# Patient Record
Sex: FEMALE | Race: WHITE | NOT HISPANIC OR LATINO | Employment: OTHER | URBAN - METROPOLITAN AREA
[De-identification: names, ages, dates, MRNs, and addresses within clinical notes are randomized per-mention and may not be internally consistent; named-entity substitution may affect disease eponyms.]

---

## 2017-02-22 ENCOUNTER — TRANSFERRED RECORDS (OUTPATIENT)
Dept: HEALTH INFORMATION MANAGEMENT | Facility: CLINIC | Age: 62
End: 2017-02-22

## 2017-06-06 ENCOUNTER — TRANSFERRED RECORDS (OUTPATIENT)
Dept: HEALTH INFORMATION MANAGEMENT | Facility: CLINIC | Age: 62
End: 2017-06-06

## 2017-07-27 ENCOUNTER — OFFICE VISIT (OUTPATIENT)
Dept: FAMILY MEDICINE | Facility: CLINIC | Age: 62
End: 2017-07-27
Payer: MEDICAID

## 2017-07-27 VITALS
HEART RATE: 83 BPM | SYSTOLIC BLOOD PRESSURE: 124 MMHG | TEMPERATURE: 98.4 F | BODY MASS INDEX: 29.89 KG/M2 | OXYGEN SATURATION: 99 % | WEIGHT: 175.1 LBS | DIASTOLIC BLOOD PRESSURE: 80 MMHG

## 2017-07-27 DIAGNOSIS — C50.912 MALIGNANT NEOPLASM OF LEFT FEMALE BREAST, UNSPECIFIED ESTROGEN RECEPTOR STATUS, UNSPECIFIED SITE OF BREAST (H): Primary | ICD-10-CM

## 2017-07-27 PROCEDURE — 99202 OFFICE O/P NEW SF 15 MIN: CPT | Performed by: PHYSICIAN ASSISTANT

## 2017-07-27 PROCEDURE — T1013 SIGN LANG/ORAL INTERPRETER: HCPCS | Mod: U3 | Performed by: PHYSICIAN ASSISTANT

## 2017-07-27 NOTE — NURSING NOTE
"No chief complaint on file.      Initial /80  Pulse 83  Temp 98.4  F (36.9  C) (Oral)  Wt 175 lb 1.6 oz (79.4 kg)  SpO2 99%  BMI 29.89 kg/m2 Estimated body mass index is 29.89 kg/(m^2) as calculated from the following:    Height as of 7/24/16: 5' 4.17\" (1.63 m).    Weight as of this encounter: 175 lb 1.6 oz (79.4 kg).  Medication Reconciliation: complete     Nathalie Penn MA      "

## 2017-07-27 NOTE — PATIENT INSTRUCTIONS
Schedule general physical and come fasting for 8 hours so we can check cholesterol  Follow up with breast center  Return to clinic for any new or worsening symptoms or go to ER Urgent care in off hours

## 2017-07-27 NOTE — PROGRESS NOTES
SUBJECTIVE:                                                    Hoda Brush is a 62 year old female who presents to clinic today for the following health issues:      Came here 3 weeks ago as a refugee  Wants new treatment plans for mastitis  2 summers ago she did have a PET scan [place] here in the [place]    History of breast cancer 3 years ago. Currently on tamoxifen          Problem list and histories reviewed & adjusted, as indicated.  Additional history: as documented    ROS:  C: NEGATIVE for fever, chills, change in weight  I: NEGATIVE for worrisome rashes, moles or lesions  E: NEGATIVE for vision changes or irritation  E/M: NEGATIVE for ear, mouth and throat problems  R: NEGATIVE for significant cough or SOB  B: NEGATIVE for masses, tenderness or discharge  CV: NEGATIVE for chest pain, palpitations or peripheral edema  GI: NEGATIVE for nausea, abdominal pain, heartburn, or change in bowel habits  : NEGATIVE for frequency, dysuria, or hematuria  M: NEGATIVE for significant arthralgias or myalgia  N: NEGATIVE for weakness, dizziness or paresthesias  E: NEGATIVE for temperature intolerance, skin/hair changes  H: NEGATIVE for bleeding problems  P: NEGATIVE for changes in mood or affect    There is no problem list on file for this patient.    Past Surgical History:   Procedure Laterality Date     MASTECTOMY         Social History   Substance Use Topics     Smoking status: Never Smoker     Smokeless tobacco: Never Used     Alcohol use No     Family History   Problem Relation Age of Onset     Breast Cancer Mother 45     Lung Cancer Father      smoker     Breast Cancer Sister 61               OBJECTIVE:                                                    /80  Pulse 83  Temp 98.4  F (36.9  C) (Oral)  Wt 175 lb 1.6 oz (79.4 kg)  SpO2 99%  BMI 29.89 kg/m2 Body mass index is 29.89 kg/(m^2).   GENERAL: healthy, alert, well nourished, well hydrated, no distress  EYES: Eyes grossly normal to inspection,  "extraocular movements - intact, and PERRL  HENT: ear canals- normal; TMs- normal; Nose- normal; Mouth- no ulcers, no lesions  NECK: no tenderness, no adenopathy, no asymmetry, no masses, no stiffness; thyroid- normal to palpation  RESP: lungs clear to auscultation - no rales, no rhonchi, no wheezes  CV: regular rates and rhythm, normal S1 S2, no S3 or S4 and no murmur, no click or rub -  MS: extremities- no gross deformities noted, no edema  PSYCH: Alert and oriented times 3; speech- coherent , normal rate and volume; able to articulate logical thoughts, able to abstract reason, no tangential thoughts, no hallucinations or delusions, affect- normal         ASSESSMENT/PLAN:                                                        ICD-10-CM    1. Malignant neoplasm of left female breast, unspecified estrogen receptor status, unspecified site of breast (H) C50.912 BREAST CENTER REFERRAL       Patient Instructions   Schedule general physical and come fasting for 8 hours so we can check cholesterol  Follow up with breast center  Return to clinic for any new or worsening symptoms or go to ER Urgent care in off hours            Estimated body mass index is 29.89 kg/(m^2) as calculated from the following:    Height as of 7/24/16: 5' 4.17\" (1.63 m).    Weight as of this encounter: 175 lb 1.6 oz (79.4 kg).       Steffi Smith [facility]  [facility]    "

## 2017-08-08 ENCOUNTER — OFFICE VISIT (OUTPATIENT)
Dept: FAMILY MEDICINE | Facility: CLINIC | Age: 62
End: 2017-08-08
Payer: MEDICAID

## 2017-08-08 VITALS
SYSTOLIC BLOOD PRESSURE: 119 MMHG | DIASTOLIC BLOOD PRESSURE: 78 MMHG | OXYGEN SATURATION: 97 % | TEMPERATURE: 97.5 F | HEIGHT: 63 IN | WEIGHT: 178 LBS | HEART RATE: 74 BPM | BODY MASS INDEX: 31.54 KG/M2

## 2017-08-08 DIAGNOSIS — Z00.00 ENCOUNTER FOR ROUTINE ADULT HEALTH EXAMINATION WITHOUT ABNORMAL FINDINGS: Primary | ICD-10-CM

## 2017-08-08 DIAGNOSIS — E55.9 VITAMIN D DEFICIENCY: ICD-10-CM

## 2017-08-08 DIAGNOSIS — Z11.59 NEED FOR HEPATITIS C SCREENING TEST: ICD-10-CM

## 2017-08-08 DIAGNOSIS — C50.912 MALIGNANT NEOPLASM OF LEFT FEMALE BREAST, UNSPECIFIED ESTROGEN RECEPTOR STATUS, UNSPECIFIED SITE OF BREAST (H): ICD-10-CM

## 2017-08-08 DIAGNOSIS — Z12.11 SCREENING FOR COLON CANCER: ICD-10-CM

## 2017-08-08 DIAGNOSIS — Z12.4 SCREENING FOR CERVICAL CANCER: ICD-10-CM

## 2017-08-08 DIAGNOSIS — Z13.220 LIPID SCREENING: ICD-10-CM

## 2017-08-08 LAB
ALBUMIN SERPL-MCNC: 3.3 G/DL (ref 3.4–5)
ALP SERPL-CCNC: 35 U/L (ref 40–150)
ALT SERPL W P-5'-P-CCNC: 27 U/L (ref 0–50)
ANION GAP SERPL CALCULATED.3IONS-SCNC: 7 MMOL/L (ref 3–14)
AST SERPL W P-5'-P-CCNC: 27 U/L (ref 0–45)
BILIRUB SERPL-MCNC: 0.2 MG/DL (ref 0.2–1.3)
BUN SERPL-MCNC: 16 MG/DL (ref 7–30)
CALCIUM SERPL-MCNC: 8.6 MG/DL (ref 8.5–10.1)
CHLORIDE SERPL-SCNC: 108 MMOL/L (ref 94–109)
CHOLEST SERPL-MCNC: 154 MG/DL
CO2 SERPL-SCNC: 28 MMOL/L (ref 20–32)
CREAT SERPL-MCNC: 0.85 MG/DL (ref 0.52–1.04)
DEPRECATED CALCIDIOL+CALCIFEROL SERPL-MC: 19 UG/L (ref 20–75)
GFR SERPL CREATININE-BSD FRML MDRD: 68 ML/MIN/1.7M2
GLUCOSE SERPL-MCNC: 86 MG/DL (ref 70–99)
HCV AB SERPL QL IA: NORMAL
HDLC SERPL-MCNC: 53 MG/DL
LDLC SERPL CALC-MCNC: 74 MG/DL
NONHDLC SERPL-MCNC: 101 MG/DL
POTASSIUM SERPL-SCNC: 3.8 MMOL/L (ref 3.4–5.3)
PROT SERPL-MCNC: 7.1 G/DL (ref 6.8–8.8)
SODIUM SERPL-SCNC: 143 MMOL/L (ref 133–144)
TRIGL SERPL-MCNC: 133 MG/DL
TSH SERPL DL<=0.005 MIU/L-ACNC: 1.3 MU/L (ref 0.4–4)

## 2017-08-08 PROCEDURE — T1013 SIGN LANG/ORAL INTERPRETER: HCPCS | Mod: U3 | Performed by: PHYSICIAN ASSISTANT

## 2017-08-08 PROCEDURE — 80053 COMPREHEN METABOLIC PANEL: CPT | Performed by: PHYSICIAN ASSISTANT

## 2017-08-08 PROCEDURE — 84443 ASSAY THYROID STIM HORMONE: CPT | Performed by: PHYSICIAN ASSISTANT

## 2017-08-08 PROCEDURE — 80061 LIPID PANEL: CPT | Performed by: PHYSICIAN ASSISTANT

## 2017-08-08 PROCEDURE — 87624 HPV HI-RISK TYP POOLED RSLT: CPT | Performed by: PHYSICIAN ASSISTANT

## 2017-08-08 PROCEDURE — 36415 COLL VENOUS BLD VENIPUNCTURE: CPT | Performed by: PHYSICIAN ASSISTANT

## 2017-08-08 PROCEDURE — G0472 HEP C SCREEN HIGH RISK/OTHER: HCPCS | Performed by: PHYSICIAN ASSISTANT

## 2017-08-08 PROCEDURE — G0145 SCR C/V CYTO,THINLAYER,RESCR: HCPCS | Performed by: PHYSICIAN ASSISTANT

## 2017-08-08 PROCEDURE — 82306 VITAMIN D 25 HYDROXY: CPT | Performed by: PHYSICIAN ASSISTANT

## 2017-08-08 PROCEDURE — 99396 PREV VISIT EST AGE 40-64: CPT | Performed by: PHYSICIAN ASSISTANT

## 2017-08-08 PROCEDURE — G0476 HPV COMBO ASSAY CA SCREEN: HCPCS | Performed by: PHYSICIAN ASSISTANT

## 2017-08-08 NOTE — NURSING NOTE
"Chief Complaint   Patient presents with     Physical       Initial /78  Pulse 74  Temp 97.5  F (36.4  C) (Oral)  Ht 5' 3\" (1.6 m)  Wt 178 lb (80.7 kg)  SpO2 97%  BMI 31.53 kg/m2 Estimated body mass index is 31.53 kg/(m^2) as calculated from the following:    Height as of this encounter: 5' 3\" (1.6 m).    Weight as of this encounter: 178 lb (80.7 kg).  Medication Reconciliation: complete   Mary Triplett MA      "

## 2017-08-08 NOTE — MR AVS SNAPSHOT
After Visit Summary   8/8/2017    Hoda Brush    MRN: [id number]           Patient Information     Date Of Birth          1955        Visit Information        Provider Department      8/8/2017 7:30 AM Steffi Webb PA-C; [facility]        Today's Diagnoses     Encounter for routine adult health examination without abnormal findings    -  1    Malignant neoplasm of left female breast, unspecified estrogen receptor status, unspecified site of breast (H)        Screening for colon cancer        Lipid screening        Vitamin D deficiency        Need for hepatitis C screening test           Follow-ups after your visit        Additional Services     COLORECTAL SURGERY REFERRAL       Your provider has referred you to: [facility]: [facility] [phone]   [email or web address]    Referral Reason(s): Colonoscopy  Special Concerns:   This referral is: Elective (week +)  It  OK to leave a message on patient's voicemail.    Please be aware that coverage of these services is subject to the terms and limitations of your health insurance plan.  Call member services at your health plan with any benefit or coverage questions.      Please bring the following with you to your appointment:    (1) Any X-Rays, CTs or MRIs which have been performed.  Contact the facility where they were done to arrange for  prior to your scheduled appointment.    (2) List of current medications  (3) This referral request   (4) Any documents/labs given to you for this referral                  Your next 10 appointments already scheduled     Aug 15, 2017  8:30 AM CDT   (Arrive by 8:15 AM)   New Patient Visit with Lisandro Aguiar MD   [facility] ([facility])    [address] [place] [id number] [phone]              Who to  "contact     If you have questions or need follow up information about today's clinic visit or your schedule please contact [facility] directly at [phone].  Normal or non-critical lab and imaging results will be communicated to you by MyChart, letter or phone within 4 business days after the clinic has received the results. If you do not hear from us within 7 days, please contact the clinic through [employer] or phone. If you have a critical or abnormal lab result, we will notify you by phone as soon as possible.  Submit refill requests through [facility] or call your pharmacy and they will forward the refill request to us. Please allow 3 business days for your refill to be completed.          Additional Information About Your Visit        [facility] Information     [facility] lets you send messages to your doctor, view your test results, renew your prescriptions, schedule appointments and more. To sign up, go to [email or web address] . Click on \"Log in\" on the left side of the screen, which will take you to the Welcome page. Then click on \"Sign up Now\" on the right side of the page.     You will be asked to enter the access code listed below, as well as some personal information. Please follow the directions to create your username and password.     Your access code is: [email or web address]  Expires: 10/25/2017  8:18 AM     Your access code will  in 90 days. If you need help or a new code, please call your [facility] clinic or [phone].        Care EveryWhere ID     This is your Care EveryWhere ID. This could be used by other organizations to access your [facility] medical records  [id number]        Your Vitals Were     Pulse Temperature Height Pulse Oximetry BMI (Body Mass Index)       74 97.5  F (36.4  C) (Oral) 5' 3\" (1.6 m) 97% 31.53 kg/m2        Blood Pressure from Last 3 Encounters:   17 119/78   17 124/80   16 118/61    Weight from Last 3 Encounters:   17 178 lb (80.7 kg) "   07/27/17 175 lb 1.6 oz (79.4 kg)   07/24/16 174 lb 2.6 oz (79 kg)              We Performed the Following     COLORECTAL SURGERY REFERRAL     Comprehensive metabolic panel     Hepatitis C antibody     Lipid panel reflex to direct LDL     TSH with free T4 reflex     Vitamin D Deficiency        Primary Care Provider    Physician No Ref-Primary       No address on file        Equal Access to Services     NOE VAZQUEZ : Hadii kamran ku hadsreedharo Soomaali, waaxda luqadaha, qaybta kaalmada dougkarinateo, alexa saldañawillisana paula angulo . So [facility] [phone].    ATENCIÓN: Si habla español, tiene a lagunas disposición servicios gratuitos de asistencia lingüística. Llame al [phone].    We comply with applicable federal civil rights laws and [place] laws. We do not discriminate on the basis of race, color, national origin, age, disability sex, sexual orientation or gender identity.            Thank you!     Thank you for choosing [facility]  for your care. Our goal is always to provide you with excellent care. Hearing back from our patients is one way we can continue to improve our services. Please take a few minutes to complete the written survey that you may receive in the mail after your visit with us. Thank you!             Your Updated Medication List - Protect others around you: Learn how to safely use, store and throw away your medicines at www.disposemymeds.org.          This list is accurate as of: 8/8/17  8:13 AM.  Always use your most recent med list.                   Brand Name Dispense Instructions for use Diagnosis    HYDROcodone-acetaminophen 5-325 MG per tablet    NORCO    8 tablet    Take 1 tablet by mouth every 8 hours as needed for moderate to severe pain        TAMOXIFEN CITRATE PO

## 2017-08-08 NOTE — PROGRESS NOTES
SUBJECTIVE:   CC: Hoda Brush is an 62 year old woman who presents for preventive health visit.     Physical   Annual:     Getting at least 3 servings of Calcium per day::  Yes    Bi-annual eye exam::  Yes    Dental care twice a year::  Yes    Sleep apnea or symptoms of sleep apnea::  None    Diet::  Regular (no restrictions)    Frequency of exercise::  None    Taking medications regularly::  Yes    Medication side effects::  None    Additional concerns today::  No    Has appointment  On 8/15/17 with oncology          Today's PHQ-2 Score:   PHQ-2 ( 1999 Pfizer) 8/8/2017   Q1: Little interest or pleasure in doing things 0   Q2: Feeling down, depressed or hopeless 0   PHQ-2 Score 0       Abuse: Current or Past(Physical, Sexual or Emotional)- No  Do you feel safe in your environment - Yes    Social History   Substance Use Topics     Smoking status: Never Smoker     Smokeless tobacco: Never Used     Alcohol use No     The patient does not drink >3 drinks per day nor >7 drinks per week.    Reviewed orders with patient.  Reviewed health maintenance and updated orders accordingly - Yes  Labs reviewed in [facility]    Alternate mammogram schedule due to breast cancer history sees oncology    Pertinent mammograms are reviewed under the imaging tab.  History of abnormal Pap smear: NO - age 30-65 PAP every 5 years with negative HPV co-testing recommended    Reviewed and updated as needed this visit by clinical staff  Tobacco  Allergies  Meds         Reviewed and updated as needed this visit by Provider            ROS:  C: NEGATIVE for fever, chills, change in weight  I: NEGATIVE for worrisome rashes, moles or lesions  E: NEGATIVE for vision changes or irritation  ENT: NEGATIVE for ear, mouth and throat problems  R: NEGATIVE for significant cough or SOB  B: NEGATIVE for masses, tenderness or discharge  CV: NEGATIVE for chest pain, palpitations or peripheral edema  GI: NEGATIVE for nausea, abdominal pain, heartburn, or  "change in bowel habits  : NEGATIVE for unusual urinary or vaginal symptoms. No vaginal bleeding.  M: NEGATIVE for significant arthralgias or myalgia  N: NEGATIVE for weakness, dizziness or paresthesias  E: NEGATIVE for temperature intolerance, skin/hair changes  P: NEGATIVE for changes in mood or affect      OBJECTIVE:   /78  Pulse 74  Temp 97.5  F (36.4  C) (Oral)  Ht 5' 3\" (1.6 m)  Wt 178 lb (80.7 kg)  SpO2 97%  BMI 31.53 kg/m2  EXAM:  GENERAL: healthy, alert and no distress  EYES: Eyes grossly normal to inspection, PERRL and conjunctivae and sclerae normal  HENT: ear canals and TM's normal, nose and mouth without ulcers or lesions  NECK: no adenopathy, no asymmetry, masses, or scars and thyroid normal to palpation  RESP: lungs clear to auscultation - no rales, rhonchi or wheezes  CV: regular rate and rhythm, normal S1 S2, no S3 or S4, no murmur, click or rub, no peripheral edema and peripheral pulses strong  ABDOMEN: soft, nontender, no hepatosplenomegaly, no masses and bowel sounds normal   (female): normal female external genitalia, normal urethral meatus, vaginal mucosa pink, moist, well rugated, and normal cervix/adnexa/uterus without masses or discharge  MS: no gross musculoskeletal defects noted, no edema  SKIN: no suspicious lesions or rashes  NEURO: Normal strength and tone, mentation intact and speech normal  PSYCH: mentation appears normal, affect normal/bright    ASSESSMENT/PLAN:       ICD-10-CM    1. Encounter for routine adult health examination without abnormal findings Z00.00    2. Malignant neoplasm of left female breast, unspecified estrogen receptor status, unspecified site of breast (H) C50.912 TSH with free T4 reflex     Comprehensive metabolic panel   3. Screening for colon cancer Z12.11 COLORECTAL SURGERY REFERRAL   4. Lipid screening Z13.220 Lipid panel reflex to direct LDL   5. Vitamin D deficiency E55.9 Vitamin D Deficiency   6. Need for hepatitis C screening test Z11.59 " "Hepatitis C antibody   7. Screening for cervical cancer Z12.4 Pap imaged thin layer screen with HPV - recommended age 30 - 65 years (select HPV order below)     HPV High Risk Types DNA Cervical       COUNSELING:  Reviewed preventive health counseling, as reflected in patient instructions         reports that she has never smoked. She has never used smokeless tobacco.    Estimated body mass index is 31.53 kg/(m^2) as calculated from the following:    Height as of this encounter: 5' 3\" (1.6 m).    Weight as of this encounter: 178 lb (80.7 kg).   Weight management plan: Discussed healthy diet and exercise guidelines and patient will follow up in 12 months in clinic to re-evaluate.    Counseling Resources:  ATP IV Guidelines  Pooled Cohorts Equation Calculator  Breast Cancer Risk Calculator  FRAX Risk Assessment  ICSI Preventive Guidelines  Dietary Guidelines for Americans, 2010  USDA's MyPlate  ASA Prophylaxis  Lung CA Screening    Steffi Webb PA-C  [facility]  "

## 2017-08-10 ENCOUNTER — NURSE TRIAGE (OUTPATIENT)
Dept: NURSING | Facility: CLINIC | Age: 62
End: 2017-08-10

## 2017-08-10 NOTE — TELEPHONE ENCOUNTER
Dtr calling on Hoda's behalf (requiring ) to clarify amount of and which Vitamin supplementation it was recommended she take.  Advised Vitamin D 5000IU daily with recheck in January per provider notes.    Additional Information    [1] Follow-up call to recent contact AND [2] information only call, no triage required    Protocols used: INFORMATION ONLY CALL-ADULT-

## 2017-08-11 LAB
COPATH REPORT: NORMAL
PAP: NORMAL

## 2017-08-14 LAB
FINAL DIAGNOSIS: NORMAL
HPV HR 12 DNA CVX QL NAA+PROBE: NEGATIVE
HPV16 DNA SPEC QL NAA+PROBE: NEGATIVE
HPV18 DNA SPEC QL NAA+PROBE: NEGATIVE
SPECIMEN DESCRIPTION: NORMAL

## 2017-08-15 ENCOUNTER — ONCOLOGY VISIT (OUTPATIENT)
Dept: ONCOLOGY | Facility: CLINIC | Age: 62
End: 2017-08-15
Attending: INTERNAL MEDICINE
Payer: MEDICAID

## 2017-08-15 VITALS
HEART RATE: 87 BPM | OXYGEN SATURATION: 95 % | HEIGHT: 63 IN | WEIGHT: 175.3 LBS | DIASTOLIC BLOOD PRESSURE: 77 MMHG | RESPIRATION RATE: 16 BRPM | SYSTOLIC BLOOD PRESSURE: 127 MMHG | BODY MASS INDEX: 31.06 KG/M2 | TEMPERATURE: 97.2 F

## 2017-08-15 DIAGNOSIS — C50.912 MALIGNANT NEOPLASM OF LEFT FEMALE BREAST, UNSPECIFIED ESTROGEN RECEPTOR STATUS, UNSPECIFIED SITE OF BREAST (H): Primary | ICD-10-CM

## 2017-08-15 DIAGNOSIS — Z51.11 ENCOUNTER FOR ANTINEOPLASTIC CHEMOTHERAPY: ICD-10-CM

## 2017-08-15 PROCEDURE — 99205 OFFICE O/P NEW HI 60 MIN: CPT | Mod: ZP | Performed by: INTERNAL MEDICINE

## 2017-08-15 PROCEDURE — 99212 OFFICE O/P EST SF 10 MIN: CPT | Mod: ZF

## 2017-08-15 PROCEDURE — T1013 SIGN LANG/ORAL INTERPRETER: HCPCS | Mod: U3,ZF | Performed by: INTERNAL MEDICINE

## 2017-08-15 RX ORDER — TAMOXIFEN CITRATE 20 MG/1
20 TABLET ORAL
COMMUNITY
Start: 2015-06-30 | End: 2017-09-14

## 2017-08-15 ASSESSMENT — PAIN SCALES - GENERAL: PAINLEVEL: NO PAIN (0)

## 2017-08-15 NOTE — PROGRESS NOTES
HISTORY OF PRESENT ILLNESS:      Hoda is seen with her daughter, Ashleigh, today for a new patient visit.  The patient is a refugee from [facility] and is here for continuation of care for her metastatic ER+HER2- breast cancer.  She is a [employer] refugee from [facility] and was seen in clinic with her daughter.  The only data we have from [facility] is an English translation of her records.     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  On 02/11/2014, she was seen by an oncologist.  The clinical staging of T4b N2 M1 was noted.   Her breast cancer was on the left side. There was no right breast cancer, confirmed by the patient and her daughter, correcting a possible error in the translated records.  ER was positive in 100% of the cells, UT at 14% and HER2 was 3+ positive.  It appears that this histopathologic information is on the mastectomy specimen. She underwent an MRI for staging of presumptive bone metastases which was performed 03/02/2014.  There were skeletal metastases in the thoracic vertebrae at 12, L1, L3, L5 and S1 vertebral body, ranging in size from 0.7-3.0 cm in size.  Ischial and right femur were also involved, as well as a large iliac mass measuring about 15 cm in the uterus. 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   Radiation Oncology consultation was performed 03/11/2014, and she was given radiation in 1 dose of 6 Gy to the right iliac lesion.      With the initial diagnosis, she initiated treatment with 6 cycles of CAF neoadjuvant chemotherapy 02/14, 07/07, 03/28, 04/18/14, 05/08 and 05/29/14. She also received monthly zoledronic acid.  She then underwent a modified left mastectomy of Palacios type and left lymphadenopathy on 06/27/2014.   Pathologic examination showed number at [facility] was [id number] showed infiltrative grade 2 ductal cancer with 6 level 1 metastatic lymph nodes and 3 level 2 metastatic lymph nodes.   The differentiation was intermediate.  The tumor was ER positive 70% of the cells, DC positive in 40% of the cells and HER2 was 3+ by immunohistochemistry and the Ki-67 labeling index was 20%. Her staging after surgery was stage IV, pT4b N2 M1.  I don't see a biopsy of the skeletal metastases.  She then had continuation with chemotherapy with 4 cycles of Herceptin and taxane with montly zoledronic acid.  Tamoxifen was initiated.  She then had two years of Herceptin and a decision was made not to continue further Herceptin.  She continued on zoledronic acid every 3 months. She was clinically stable. [place] from [facility].  She arrived last month, established [place] residency and now seeks further oncology care.      Overall, Hoda has been doing reasonably well.  She has medical assistance from the [facility].      REVIEW OF SYSTEMS:  PS 1.  She does nap some but is able to do all of her household chores.  She denies fevers or chills, cough, chest pain, shortness breath, nausea, vomiting, constipation or diarrhea.  No hemoptysis, no numbness or tingling, hearing loss or depression.  She does have some low back pain.  She has no vision or coordination problems.  No difficulty with walking.  No neurologic symptoms.  The remainder of a 12-point review of systems is negative.     PAST MEDICAL HISTORY:  No history of heart disease, no breathing problems, blood clots, seizures, arthritis, peptic ulcer disease, osteoporosis.  History of left wrist fracture.       FAMILY HISTORY:  Positive for breast cancer in her mother diagnosed at age 45 and then again at age 70.  She had a sister with breast cancer who was diagnosed in her 60s.  Her sister lives in [place] and was tested for BRCA mutation and was BRCA mutation negative by report.  She has a daughter who is in good health and a daughter who passed away.  There is no family history of male breast cancer.  No family history of  ovarian cancer.     PAST MENSTRUAL HISTORY:  First period at age 16, first pregnancy at age 22, second pregnancy at age 29, last period was at age 46.  Uterus and ovaries are in place.  No history of hormone replacement therapy. Notably, the patient's uterus and ovaries remain in place.      HABITS:  No history of tobacco or alcohol.     SOCIAL:  She worked as an  in [employer] and is a refugee.  She is of Jehovah's witness descent and has not had BRCA testing.     TREATMENT SUMMARY:  A.  Diagnosis with bone only metasatases and single dose of 6 Gy to right iliac mass.  B.  Preoperative AC for 6 cycles.   C.  Left mastatectomy and lymph node dissection.   D.  Taxol and Herceptin for 4 cycles.   E.  Herceptin for completed 2 years, ending mid-2016.   F.  Tamoxifen beginning in late 2015.  G.  Zoledronic acid monthly in 2014, and then every 3 months.      Last Zometa was 06/06/2017 and last Herceptin was approximately a year ago.      PHYSICAL EXAMINATION:   VITAL SIGNS:  Blood pressure 127/77, temperature 97.2, pulse 87, respirations 16, O2 sat 95% on room air, height 1.6 meters and weight 79.5 kg.   GENERAL:  Hoda appeared generally well.  She has no alopecia.   HEENT:  Oropharynx is without lesions.   LYMPH:  There is no palpable cervical, supraclavicular, subclavicular or axillary lymphadenopathy.   BREASTS:  Examination of the right breast is without masses.  Examination of the left anterior chest wall reveals a well-healed mastectomy incision which is without erythema or masses.  No masses in the anterior chest wall bilaterally.   LUNGS:  Clear to percussion and auscultation.   HEART:  There is a regular rate and rhythm, S1, S2.   ABDOMEN:  Soft and nontender, without hepatosplenomegaly.   EXTREMITIES:  Without edema.   PSYCHIATRIC:  Mood and affect were normal.   NEUROLOGIC:  Deep tendon reflexes were 0 in the upper and lower extremities bilaterally.  Motor strength was 5/5 in the  upper and lower extremities bilaterally.  Cranial nerves II-XII were grossly intact, and toes were downgoing bilaterally.      LABORATORY DATA:  The CBC and CMP were within normal limits, performed 08/08/2017.      ASSESSMENT AND PLAN:    1.  Hoda Brush is a 62-year-old woman who as a refugee from [facility] formally from [facility] who comes [place] with her daughter and resume care for ER+HER2 negative metastatic breast cancer with bone only metastases by report.  She was presented with and was diagnosed with metastatic breast cancer approximately 02/2014 when she was diagnosed with a T4 N2 M1 invasive ductal carcinoma of the left breast.  By report her metastatic disease was bone only but we don't have complete records, only an English translation.  After initial staging, she had radiation to a painful right iliac mass which was 1 dose of 6 gray with improvement of symptoms.  She had neoadjuvant AC chemotherapy for 6 cycles and then had a left mastectomy, surgery.  She also was given zoledronic acid, both neoadjuvantly and postoperatively. She did receive postoperative paclitaxel with Herceptin for 4 cycles and then continued Herceptin for a total of 2 years, completed approximately 1 year ago.  She does have some complaints of lymphedema and some pain in the right hip.  She does need restaging.     2.  Restaging PET/CT scan of chest, abdomen and pelvis will be performed.  We will also perform a brain MRI for staging.  She has no history of brain metastases but is now approximately 3 years with metastatic HER2-positive breast cancer, and there is approximately a 50% risk of brain metastases.   3.  Tumor histology is apparently ER positive, NC positive and HER2 positive.  She will continue on tamoxifen for now.  We have no pathology reports or tissue.   4.  No histology available to us.  I discussed with her daughter, and it sounds like we will not be able to get blocks or slides.  I do favor of  biopsy of the hip lesion or another metastatic lesion to establish a tissue diagnosis.  I did discuss with her that if we were unable to get a tissue diagnosis I would continue HER2-targeted therapy.   5.  Choice of HER2 targeted therapy.  I think it would be reasonable to give pertuzumab and trastuzumab along with an aromatase inhibitor.  We will reevaluate with restaging at 8 weeks and see how she is doing on this regimen.   6.  Lymphedema therapy has been requested.   7.  Cardiac echo for baseline will be requested.  She has had approximately 6 cycles of Adriamycin and about 480 mg/m2 of Adriamycin, as well as Herceptin.  She has no cardiac symptoms at this time.   8.  Genetic testing is recommended with 2 first-degree relatives with breast cancer, one of which was diagnosed at the age of 45.   9.  The recommendation of exercise with some mixed cardio and strength exercises with hand weights.  I do not recommend heavy lifting, and I do recommend running, although walking would be reasonable.  Swimming cannot be recommended because she does not swim.    10.  Xgeva will be started monthly on the next visit.   11.  Return to clinic in 1 week to go over the results of the scans.  We will consider port placement at that time.  All of her questions and all of her daughter's questions were answered.   12. Follow up.  PET CT.  Brain MRI.  Echocardiogram.  All this week.  Return to clinic 8-22 with CBC, CMP, Xgeva.  Lymphedema referral.    Genetics consult.      Thank you for referring Hoda Brush to our clinic and allowing us to participate in her care.      Sincerely,    Lisandro Aguiar M.D.      Division of Hematology, Oncology, Transplant   Department of Medicine   [place]  [employer]   [phone]     ADDENDUM:  I went over the PET CT and Hoda does appear to have stable disease on tamoxifen.  I recommended that we biopsy the right iliac mass to obtain a tissue  diagnosis and check of HER2 status since we can't obtain her slides or pathology reports from [facility].  Brain MRI shows no evidence of metastatic disease.  I discussed the plan with her daughter Ashleigh.  All of her questions were answered.  Continue tamoxifen and Xgeva. IR consult for right iliac biopsy.    I discussed that biopsy showed no evidence of breast cancer.  I asked for breast cancer pathology and HER2 report from [facility] if possible.     Patient Name: NATASHA LOUIS   MR#: [id number]   Specimen #: [id number]   Collected: 8/31/2017   Received: 8/31/2017   Reported: 9/6/2017 09:27   Ordering Phy(s): ASHWIN AUGUSTE     For improved result formatting, select 'View Enhanced Report Format'   under Linked Documents section.     Original Report Follows Addendum    TO ORIGINAL REPORT   Status: Signed Out   Date Ordered:9/7/2017   Date Reported:9/7/2017 11:51   Signed Out By: Giselle Pruitt M.D.     INTERPRETATION:   The diagnosis remains unchanged     COMMENTS:   The bone marrow appears normal with trilineage hematopoiesis.   Immunohistochemical stains to assess for plasma cells show scattered   rare plasma cells accounting for approximately 1% of marrow cellularity   by  that are polytypic by kappa and lambda immunoglobulin light   chain stains.     ORIGINAL REPORT:     SPECIMEN(S):   Right iliac biopsy     FINAL DIAGNOSIS:   Bone, right iliac, CT guided core biopsy:   - Negative for metastatic carcinoma (see comment)     COMMENT:   Sections show bone and bone marrow.  No metastatic carcinoma is   identified.  Cytokeratin immunostains, performed on both blocks, are   negative, supporting the morphologic diagnosis.  No bone lesions are   identified.  We have asked our colleagues in hematopathology to evaluate   the hematopoietic elements in the bone marrow.  They will report any   additional findings in an addendum.   I have personally reviewed all specimens and/or slides, including the  "  listed special stains, and used them with my medical judgement to   determine or confirm the final diagnosis.     Electronically signed out by:     Hyun Heller M.D, [facility]     CLINICAL HISTORY:   The patient is a 62 year old woman with a complex history of left breast   carcinoma, metastatic to bone.  According to Dr. Aguiar's 8/29/17   progress note, in the electronic health record, she has a history of   left breast carcinoma (estrogen receptor positive, progesterone receptor   positive and HER2 positive), metastatic to bone at the time of diagnosis   in 2014.  She reportedly was treated with radiation (right iliac lesion)   and chemotherapy, then left mastectomy and axillary lymph node   dissection in 2014.  She received additional chemotherapy after   mastectomy (including prolonged course of Herceptin), and Tamoxifen.  A   recent PET CT showed multiple PET avid bone lesions.  Procedure: right   iliac bone CT guided core biopsy.     GROSS:   The specimen is received in formalin with proper patient identification,   labeled \"R. Iliac bone BX\".  It consists of 1.5 x 1.2 x 0.3 cm aggregate   of yellow-tan soft tissue cores and bone.  The specimen is entirely   submitted.     Summary of Sections:   1 - soft tissue   2 - bone (acid decalcification)     The specimen is collected and placed in formalin at 12:35 PM on   8/31/2017. (Dictated by: Janet Giron 8/31/2017 04:24 PM)     MICROSCOPIC:   Microscopic examination is performed.  Immunostains are examined with   positive controls.     CPT Codes:   A: 77464-MD8, 47748-LVL, 33041-RYI, 66879-GMW, 14956-WMU, 04003-DLM     TESTING LAB LOCATION:   [address] [address]   [phone]     COLLECTION SITE:   Client: [facility]   Location: URCT (B)     I spent 60 minutes with the patient more than 50% of which was in counseling and " coordination of care.

## 2017-08-15 NOTE — MR AVS SNAPSHOT
After Visit Summary   8/15/2017    Hoda Brush    MRN: [id number]           Patient Information     Date Of Birth          1955        Visit Information        Provider Department      8/15/2017 8:15 AM Lisandro Aguiar MD; [facility]        Today's Diagnoses     Malignant neoplasm of left female breast, unspecified estrogen receptor status, unspecified site of breast (H)    -  1    Encounter for antineoplastic chemotherapy           Follow-ups after your visit        Additional Services     CANCER RISK MGMT/CANCER GENETIC COUNSELING REFERRAL       Family history of breast cancer in mother, sister.            LYMPHEDEMA THERAPY REFERRAL       Left arm.  Metastatic breast cancer.                  Follow-up notes from your care team     Return in about 7 years (around 8/20/2024).      Your next 10 appointments already scheduled     Aug 21, 2017 12:45 PM CDT   PET ONCOLOGY WHOLE BODY with UUPET1   [facility] [place] PET CT ([facility] [place])    [address] [id number] [phone]           Tell your doctor:   If there is any chance you may be pregnant or if you are breastfeeding.   If you have problems lying in small spaces (claustrophobia). If you do, your doctor may give you medicine to help you relax. If you have diabetes:   Have your exam early in the morning. Your blood glucose will go up as the day goes by.   Your glucose level must be 180 or less at the start of the exam. Please take any medicines you need to ensure this blood glucose level. 24 hours before your scan: Don t do any heavy exercise. (No jogging, aerobics or other workouts.) Exercise will make your pictures less accurate. 6 hours before your scan:   Stop all food and liquids (except water).   Do not chew gum or suck on mints.   If you need to take medicine with food, you may take it with a few crackers.   Please call your Imaging Department at your exam site with any questions.            Aug 21, 2017  3:00 PM CDT   MR BRAIN W/O & W CONTRAST with UUMR2   [facility] [place], MRI ([facility])    [address] [id number] [phone]           Take your medicines as usual, unless your doctor tells you not to. Bring a list of your current medicines to your exam (including vitamins, minerals and over-the-counter drugs).  You will be given intravenous contrast for this exam. To prepare:   The day before your exam, drink extra fluids at least six 8-ounce glasses (unless your doctor tells you to restrict your fluids).   Have a blood test (creatinine test) within 30 days of your exam. Go to your clinic or Diagnostic Imaging Department for this test.  The MRI machine uses a strong magnet. Please wear clothes without metal (snaps, zippers). A sweatsuit works well, or we may give you a hospital gown.  Please remove any body piercings and hair extensions before you arrive. You will also remove watches, jewelry, hairpins, wallets, dentures, partial dental plates and hearing aids. You may wear contact lenses, and you may be able to wear your rings. We have a safe place to keep your personal items, but it is safer to leave them at home.   **IMPORTANT** THE INSTRUCTIONS BELOW ARE ONLY FOR THOSE PATIENTS WHO HAVE BEEN TOLD THEY WILL RECEIVE SEDATION OR GENERAL ANESTHESIA DURING THEIR MRI PROCEDURE:  IF YOU WILL RECEIVE SEDATION (take medicine to help you relax during your exam):   You must get the medicine from your doctor before you arrive. Bring the medicine to the exam. Do not take it at home.   Arrive one hour early. Bring someone who can take you home after the test. Your medicine will make you sleepy. After the exam, you may not drive, take a bus or take a taxi by yourself.   No eating 8 hours before your exam. You may have clear liquids up until 4 hours  before your exam. (Clear liquids include water, clear tea, black coffee and fruit juice without pulp.)  IF YOU WILL RECEIVE ANESTHESIA (be asleep for your exam):   Arrive 1 1/2 hours early. Bring someone who can take you home after the test. You may not drive, take a bus or take a taxi by yourself.   No eating 8 hours before your exam. You may have clear liquids up until 4 hours before your exam. (Clear liquids include water, clear tea, black coffee and fruit juice without pulp.)  Please call the Imaging Department at your exam site with any questions.            Aug 28, 2017  4:45 PM CDT   (Arrive by 4:30 PM)   Lymphedema Evaluation with Vidya Hunter PT   [facility]    [address] [place] [id number] [phone]            Aug 29, 2017  2:00 PM CDT   Ech Complete with ECHCR2   [facility] Echo ([facility])    [address] [place] [address]  3rd [facility] [id number] [phone]           1. Please bring or wear a comfortable two-piece outfit. 2. You may eat, drink and take your normal medicines. 3. For any questions that cannot be answered, please contact the ordering physician            Aug 29, 2017  3:00 PM CDT   [facility] Lab Draw with  [facility] LAB DRAW   [facility] Lab Draw ([facility])    [address] [place] [id number] [phone]            Aug 29, 2017  3:30 PM CDT   (Arrive by 3:15 PM)   Return Visit with Lisandro Aguiar MD   [facility]    [address] [place] [address] [id number] [phone]              Future tests that were ordered for you today     Open Standing Orders        Priority Remaining Interval Expires Ordered    CBC with platelets differential Routine 52/52  8/15/2018 8/15/2017    Comprehensive metabolic panel Routine 52/52  " 8/15/2018 8/15/2017          Open Future Orders        Priority Expected Expires Ordered    Echocardiogram Complete Routine  8/15/2018 8/15/2017    MRI Brain w/o & w Contrast Routine  3/13/2018 8/15/2017    PET Oncology Whole Body Routine  8/15/2018 8/15/2017            Who to contact     If you have questions or need follow up information about today's clinic visit or your schedule please contact [facility] directly at [phone].  Normal or non-critical lab and imaging results will be communicated to you by [employer], letter or phone within 4 business days after the clinic has received the results. If you do not hear from us within 7 days, please contact the clinic through [employer] or phone. If you have a critical or abnormal lab result, we will notify you by phone as soon as possible.  Submit refill requests through [employer] or call your pharmacy and they will forward the refill request to us. Please allow 3 business days for your refill to be completed.          Additional Information About Your Visit        [employer] Information     [employer] gives you secure access to your electronic health record. If you see a primary care provider, you can also send messages to your care team and make appointments. If you have questions, please call your primary care clinic.  If you do not have a primary care provider, please call [phone] and they will assist you.        Care EveryWhere ID     This is your Care EveryWhere ID. This could be used by other organizations to access your [facility] medical records  [id number]        Your Vitals Were     Pulse Temperature Respirations Height Pulse Oximetry BMI (Body Mass Index)    87 97.2  F (36.2  C) (Tympanic) 16 1.6 m (5' 3\") 95% 31.05 kg/m2       Blood Pressure from Last 3 Encounters:   08/15/17 127/77   08/08/17 119/78   07/27/17 124/80    Weight from Last 3 Encounters:   08/15/17 79.5 kg (175 lb 4.8 oz)   08/08/17 80.7 kg (178 lb)   07/27/17 79.4 kg (175 " lb 1.6 oz)              We Performed the Following     CANCER RISK MGMT/CANCER GENETIC COUNSELING REFERRAL     LYMPHEDEMA THERAPY REFERRAL        Primary Care Provider    Physician No Ref-Primary       No address on file        Equal Access to Services     RICHARDTONEY TAYLOR : Luz Marina kamran casanova malini Ramsey, ro aparnaluis, jamal wilkins, alexa marcanonba devin. So [facility] [phone].    ATENCIÓN: Si habla español, tiene a lagunas disposición servicios gratuitos de asistencia lingüística. Llame al [phone].    We comply with applicable federal civil rights laws and [place] laws. We do not discriminate on the basis of race, color, national origin, age, disability sex, sexual orientation or gender identity.            Thank you!     Thank you for choosing [facility]  for your care. Our goal is always to provide you with excellent care. Hearing back from our patients is one way we can continue to improve our services. Please take a few minutes to complete the written survey that you may receive in the mail after your visit with us. Thank you!             Your Updated Medication List - Protect others around you: Learn how to safely use, store and throw away your medicines at www.disposemymeds.org.          This list is accurate as of: 8/15/17 10:32 AM.  Always use your most recent med list.                   Brand Name Dispense Instructions for use Diagnosis    HYDROcodone-acetaminophen 5-325 MG per tablet    NORCO    8 tablet    Take 1 tablet by mouth every 8 hours as needed for moderate to severe pain        * TAMOXIFEN CITRATE PO           * tamoxifen 20 MG tablet    NOLVADEX     Take 20 mg by mouth        Vitamin D3 34288 UNITS Tabs           * Notice:  This list has 2 medication(s) that are the same as other medications prescribed for you. Read the directions carefully, and ask your doctor or other care provider to review them with you.

## 2017-08-15 NOTE — NURSING NOTE
"Oncology Rooming Note    August 15, 2017 8:50 AM   Hoda Brush is a 62 year old female who presents for:    Chief Complaint   Patient presents with     Oncology Clinic Visit     New-Breast Ca-dx'd 3 yrs ago     Initial Vitals: /77 (BP Location: Right arm, Patient Position: Chair, Cuff Size: Adult Regular)  Pulse 87  Temp 97.2  F (36.2  C) (Tympanic)  Resp 16  Ht 1.6 m (5' 3\")  Wt 79.5 kg (175 lb 4.8 oz)  SpO2 95%  BMI 31.05 kg/m2 Estimated body mass index is 31.05 kg/(m^2) as calculated from the following:    Height as of this encounter: 1.6 m (5' 3\").    Weight as of this encounter: 79.5 kg (175 lb 4.8 oz). Body surface area is 1.88 meters squared.  No Pain (0) Comment: Data Unavailable   No LMP recorded.  Allergies reviewed: Yes  Medications reviewed: Yes    Medications: Medication refills not needed today.  Pharmacy name entered into [facility]: [id number] - [place], [place] - [address]    Clinical concerns: New patient, transfer of care. Manual  service called and put in room with provider.    6 minutes for nursing intake (face to face time)     Corrie Mendieta LPN            "

## 2017-08-15 NOTE — LETTER
8/15/2017       RE: Hoda Brush  [address] [address]     Dear Colleague,    Thank you for referring your patient, Hoda Brush, to the [facility]. Please see a copy of my visit note below.    HISTORY OF PRESENT ILLNESS:      Hoda is seen with her daughter, Ashleigh, today for a new patient visit.  The patient is a refugee from [facility] and is here for continuation of care for her metastatic ER+HER2- breast cancer.  She is a [employer] refugee from [facility] and was seen in clinic with her daughter.  The only data we have from [facility] is an English translation of her records.     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  On 02/11/2014, she was seen by an oncologist.  The clinical staging of T4b N2 M1 was noted.   Her breast cancer was on the left side. There was no right breast cancer, confirmed by the patient and her daughter, correcting a possible error in the translated records.  ER was positive in 100% of the cells, HI at 14% and HER2 was 3+ positive.  It appears that this histopathologic information is on the mastectomy specimen. She underwent an MRI for staging of presumptive bone metastases which was performed 03/02/2014.  There were skeletal metastases in the thoracic vertebrae at 12, L1, L3, L5 and S1 vertebral body, ranging in size from 0.7-3.0 cm in size.  Ischial and right femur were also involved, as well as a large iliac mass measuring about 15 cm in the uterus. 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   Radiation Oncology consultation was performed 03/11/2014, and she was given radiation in 1 dose of 6 Gy to the right iliac lesion.      With the initial diagnosis, she initiated treatment with 6 cycles of CAF neoadjuvant chemotherapy 02/14, 07/07, 03/28, 04/18/14, 05/08 and 05/29/14. She also received monthly zoledronic acid.  She then underwent a modified left  mastectomy of Palacios type and left lymphadenopathy on 06/27/2014.   [facility] showed infiltrative grade 2 ductal cancer with 6 level 1 metastatic lymph nodes and 3 level 2 metastatic lymph nodes.  The differentiation was intermediate.  The tumor was ER positive 70% of the cells, NH positive in 40% of the cells and HER2 was 3+ by immunohistochemistry and the Ki-67 labeling index was 20%. Her staging after surgery was stage IV, pT4b N2 M1.  I don't see a biopsy of the skeletal metastases.  She then had continuation with chemotherapy with 4 cycles of Herceptin and taxane with montly zoledronic acid.  Tamoxifen was initiated.  She then had two years of Herceptin and a decision was made not to continue further Herceptin.  She continued on zoledronic acid every 3 months. She was clinically stable. [place] then [place] new refugee from [facility].  She arrived last month, established [place] residency and now seeks further oncology care.      Overall, Hoda has been doing reasonably well.  She has medical assistance from the [facility].      REVIEW OF SYSTEMS:  She denies fevers or chills, cough, chest pain, shortness breath, nausea, vomiting, constipation or diarrhea.  She does have some low back pain.  She has no vision or coordination problems.  No difficulty with walking.  No neurologic symptoms.  The remainder of a 12-point review of systems is negative.      FAMILY HISTORY:  Positive for breast cancer in her mother diagnosed at age 45 and then again at age 70.  She had a sister with breast cancer who was diagnosed in her 60s.  Her sister lives in [place] and was tested for BRCA mutation and was BRCA mutation negative by report.  She has a daughter who is in good health and a daughter who passed away.  There is no family history of male breast cancer.  No family history of ovarian cancer.     PAST MENSTRUAL HISTORY:  First period at age 16,  first pregnancy at age 22, second pregnancy in her 20s, last period was at age 46.  Uterus and ovaries are in place.  No history of hormone replacement therapy. Notably, the patient's uterus and ovaries remain in place.      HABITS:  No history of tobacco or alcohol.     SOCIAL:  She worked as an  in [employer] and is a refugee.  She is of Rastafarian descent and has not had BRCA testing.     TREATMENT SUMMARY:  A.  Diagnosis with bone only metasatases and single dose of 6 Gy to right iliac mass.  B.  Preoperative AC for 6 cycles.   C.  Left mastatectomy and lymph node dissection.   D.  Taxol and Herceptin for 4 cycles.   E.  Herceptin for completed 2 years, ending mid-2016.   F.  Tamoxifen beginning in late 2015.  G.  Zoledronic acid monthly in 2014, and then every 3 months.      Last Zometa was 06/06/2017 and last Herceptin was approximately a year ago.      PHYSICAL EXAMINATION:   VITAL SIGNS:  Blood pressure 127/77, temperature 97.2, pulse 87, respirations 16, O2 sat 95% on room air, height 1.6 meters and weight 79.5 kg.   GENERAL:  Hoda appeared generally well.  She has no alopecia.   HEENT:  Oropharynx is without lesions.   LYMPH:  There is no palpable cervical, supraclavicular, subclavicular or axillary lymphadenopathy.   BREASTS:  Examination of the right breast is without masses.  Examination of the left anterior chest wall reveals a well-healed mastectomy incision which is without erythema or masses.  No masses in the anterior chest wall bilaterally.   LUNGS:  Clear to percussion and auscultation.   HEART:  There is a regular rate and rhythm, S1, S2.   ABDOMEN:  Soft and nontender, without hepatosplenomegaly.   EXTREMITIES:  Without edema.   PSYCHIATRIC:  Mood and affect were normal.   NEUROLOGIC:  Deep tendon reflexes were 0 in the upper and lower extremities bilaterally.  Motor strength was 5/5 in the upper and lower extremities bilaterally.  Cranial nerves II-XII were  grossly intact, and toes were downgoing bilaterally.      LABORATORY DATA:  The CBC and CMP were within normal limits, performed 08/08/2017.      ASSESSMENT AND PLAN:    1.  Hoda Brush is a 62-year-old woman who as a refugee from [facility] formally from [facility] who comes [place] the [place] live with her daughter and resume care for ER+HER2 negative metastatic breast cancer with bone only metastases by report.  She was presented with and was diagnosed with metastatic breast cancer approximately 02/2014 when she was diagnosed with a T4 N2 M1 invasive ductal carcinoma of the left breast.  By report her metastatic disease was bone only but we don't have complete records, only an English translation.  After initial staging, she had radiation to a painful right iliac mass which was 1 dose of 6 gray with improvement of symptoms.  She had neoadjuvant AC chemotherapy for 6 cycles and then had a left mastectomy, surgery.  She also was given zoledronic acid, both neoadjuvantly and postoperatively. She did receive postoperative paclitaxel with Herceptin for 4 cycles and then continued Herceptin for a total of 2 years, completed approximately 1 year ago.  She does have some complaints of lymphedema and some pain in the right hip.  She does need restaging.     2.  Restaging PET/CT scan of chest, abdomen and pelvis will be performed.  We will also perform a brain MRI for staging.  She has no history of brain metastases but is now approximately 3 years with metastatic HER2-positive breast cancer, and there is approximately a 50% risk of brain metastases.   3.  Tumor histology is apparently ER positive, NY positive and HER2 positive.  She will continue on tamoxifen for now.  We have no pathology reports or tissue.   4.  No histology available to us.  I discussed with her daughter, and it sounds like we will not be able to get blocks or slides.  I do favor of biopsy of the hip lesion or another metastatic lesion to establish a  tissue diagnosis.  I did discuss with her that if we were unable to get a tissue diagnosis I would continue HER2-targeted therapy.   5.  Choice of HER2 targeted therapy.  I think it would be reasonable to give pertuzumab and trastuzumab along with an aromatase inhibitor.  We will reevaluate with restaging at 8 weeks and see how she is doing on this regimen.   6.  Lymphedema therapy has been requested.   7.  Cardiac echo for baseline will be requested.  She has had approximately 6 cycles of Adriamycin and about 480 mg/m2 of Adriamycin, as well as Herceptin.  She has no cardiac symptoms at this time.   8.  Genetic testing is recommended with 2 first-degree relatives with breast cancer, one of which was diagnosed at the age of 45.   9.  The recommendation of exercise with some mixed cardio and strength exercises with hand weights.  I do not recommend heavy lifting, and I do recommend running, although walking would be reasonable.  Swimming cannot be recommended because she does not swim.    10.  Xgeva will be started monthly on the next visit.   11.  Return to clinic in 1 week to go over the results of the scans.  We will consider port placement at that time.  All of her questions and all of her daughter's questions were answered.   12. Follow up.  PET CT.  Brain MRI.  Echocardiogram.  All this week.  Return to clinic 8-22 with CBC, CMP, Xgeva.  Lymphedema referral.    Genetics consult.      Thank you for referring Hoda Brush to our clinic and allowing us to participate in her care.      Sincerely,    Lisandro Aguiar M.D.      Division of Hematology, Oncology, Transplant   Department of Medicine   [facility]   [phone]       I spent 60 minutes with the patient more than 50% of which was in counseling and coordination of care.     Again, thank you for allowing me to participate in the care of your patient.      Sincerely,    Lisandro Aguiar MD

## 2017-08-21 ENCOUNTER — HOSPITAL ENCOUNTER (OUTPATIENT)
Dept: MRI IMAGING | Facility: CLINIC | Age: 62
Discharge: HOME | End: 2017-08-21
Attending: INTERNAL MEDICINE | Admitting: INTERNAL MEDICINE
Payer: MEDICAID

## 2017-08-21 ENCOUNTER — HOSPITAL ENCOUNTER (OUTPATIENT)
Dept: PET IMAGING | Facility: CLINIC | Age: 62
End: 2017-08-21
Attending: INTERNAL MEDICINE
Payer: MEDICAID

## 2017-08-21 DIAGNOSIS — C50.912 MALIGNANT NEOPLASM OF LEFT FEMALE BREAST, UNSPECIFIED ESTROGEN RECEPTOR STATUS, UNSPECIFIED SITE OF BREAST (H): ICD-10-CM

## 2017-08-21 LAB — GLUCOSE BLDC GLUCOMTR-MCNC: 79 MG/DL (ref 70–99)

## 2017-08-21 PROCEDURE — 71260 CT THORAX DX C+: CPT | Mod: PS

## 2017-08-21 PROCEDURE — 25000128 H RX IP 250 OP 636: Performed by: INTERNAL MEDICINE

## 2017-08-21 PROCEDURE — 70553 MRI BRAIN STEM W/O & W/DYE: CPT

## 2017-08-21 PROCEDURE — A9585 GADOBUTROL INJECTION: HCPCS | Performed by: INTERNAL MEDICINE

## 2017-08-21 PROCEDURE — 34300033 ZZH RX 343: Performed by: INTERNAL MEDICINE

## 2017-08-21 PROCEDURE — T1013 SIGN LANG/ORAL INTERPRETER: HCPCS | Mod: U3

## 2017-08-21 PROCEDURE — 82962 GLUCOSE BLOOD TEST: CPT

## 2017-08-21 PROCEDURE — 78816 PET IMAGE W/CT FULL BODY: CPT | Mod: PS

## 2017-08-21 PROCEDURE — A9552 F18 FDG: HCPCS | Performed by: INTERNAL MEDICINE

## 2017-08-21 PROCEDURE — 40000141 ZZH STATISTIC PERIPHERAL IV START W/O US GUIDANCE

## 2017-08-21 RX ORDER — GADOBUTROL 604.72 MG/ML
7.5 INJECTION INTRAVENOUS ONCE
Status: COMPLETED | OUTPATIENT
Start: 2017-08-21 | End: 2017-08-21

## 2017-08-21 RX ORDER — IOPAMIDOL 755 MG/ML
107 INJECTION, SOLUTION INTRAVASCULAR ONCE
Status: COMPLETED | OUTPATIENT
Start: 2017-08-21 | End: 2017-08-21

## 2017-08-21 RX ADMIN — FLUDEOXYGLUCOSE F-18 10.98 MCI.: 500 INJECTION, SOLUTION INTRAVENOUS at 14:00

## 2017-08-21 RX ADMIN — GADOBUTROL 7.5 ML: 604.72 INJECTION INTRAVENOUS at 14:47

## 2017-08-21 RX ADMIN — IOPAMIDOL 107 ML: 755 INJECTION, SOLUTION INTRAVENOUS at 14:00

## 2017-08-24 DIAGNOSIS — R60.9 EDEMA, UNSPECIFIED TYPE: Primary | ICD-10-CM

## 2017-08-24 DIAGNOSIS — I89.0 LYMPHEDEMA: ICD-10-CM

## 2017-08-24 DIAGNOSIS — C50.912 MALIGNANT NEOPLASM OF LEFT FEMALE BREAST, UNSPECIFIED ESTROGEN RECEPTOR STATUS, UNSPECIFIED SITE OF BREAST (H): ICD-10-CM

## 2017-08-28 ENCOUNTER — HOSPITAL ENCOUNTER (OUTPATIENT)
Dept: PHYSICAL THERAPY | Facility: CLINIC | Age: 62
Setting detail: THERAPIES SERIES
End: 2017-08-28
Attending: INTERNAL MEDICINE
Payer: MEDICAID

## 2017-08-28 ENCOUNTER — OFFICE VISIT (OUTPATIENT)
Dept: INTERVENTIONAL RADIOLOGY/VASCULAR | Facility: CLINIC | Age: 62
End: 2017-08-28
Attending: INTERNAL MEDICINE

## 2017-08-28 VITALS
HEART RATE: 92 BPM | DIASTOLIC BLOOD PRESSURE: 79 MMHG | BODY MASS INDEX: 31.04 KG/M2 | OXYGEN SATURATION: 98 % | SYSTOLIC BLOOD PRESSURE: 132 MMHG | WEIGHT: 175.2 LBS

## 2017-08-28 DIAGNOSIS — I89.0 LYMPHEDEMA OF LEFT UPPER EXTREMITY: Primary | ICD-10-CM

## 2017-08-28 DIAGNOSIS — C50.912 MALIGNANT NEOPLASM OF LEFT FEMALE BREAST, UNSPECIFIED ESTROGEN RECEPTOR STATUS, UNSPECIFIED SITE OF BREAST (H): ICD-10-CM

## 2017-08-28 DIAGNOSIS — M89.9 BONE LESION: ICD-10-CM

## 2017-08-28 DIAGNOSIS — M89.9 BONE LESION: Primary | ICD-10-CM

## 2017-08-28 LAB
ALBUMIN SERPL-MCNC: 3.5 G/DL (ref 3.4–5)
ALP SERPL-CCNC: 45 U/L (ref 40–150)
ALT SERPL W P-5'-P-CCNC: 27 U/L (ref 0–50)
ANION GAP SERPL CALCULATED.3IONS-SCNC: 7 MMOL/L (ref 3–14)
AST SERPL W P-5'-P-CCNC: 22 U/L (ref 0–45)
BASOPHILS # BLD AUTO: 0 10E9/L (ref 0–0.2)
BASOPHILS NFR BLD AUTO: 0.5 %
BILIRUB SERPL-MCNC: 0.1 MG/DL (ref 0.2–1.3)
BUN SERPL-MCNC: 15 MG/DL (ref 7–30)
CALCIUM SERPL-MCNC: 8.6 MG/DL (ref 8.5–10.1)
CHLORIDE SERPL-SCNC: 108 MMOL/L (ref 94–109)
CO2 SERPL-SCNC: 27 MMOL/L (ref 20–32)
CREAT SERPL-MCNC: 0.75 MG/DL (ref 0.52–1.04)
DIFFERENTIAL METHOD BLD: ABNORMAL
EOSINOPHIL # BLD AUTO: 0.3 10E9/L (ref 0–0.7)
EOSINOPHIL NFR BLD AUTO: 3 %
ERYTHROCYTE [DISTWIDTH] IN BLOOD BY AUTOMATED COUNT: 13 % (ref 10–15)
GFR SERPL CREATININE-BSD FRML MDRD: 79 ML/MIN/1.7M2
GLUCOSE SERPL-MCNC: 85 MG/DL (ref 70–99)
HCT VFR BLD AUTO: 39.6 % (ref 35–47)
HGB BLD-MCNC: 12.4 G/DL (ref 11.7–15.7)
IMM GRANULOCYTES # BLD: 0 10E9/L (ref 0–0.4)
IMM GRANULOCYTES NFR BLD: 0.2 %
INR PPP: 0.99 (ref 0.86–1.14)
LYMPHOCYTES # BLD AUTO: 3.1 10E9/L (ref 0.8–5.3)
LYMPHOCYTES NFR BLD AUTO: 37.9 %
MCH RBC QN AUTO: 30 PG (ref 26.5–33)
MCHC RBC AUTO-ENTMCNC: 31.3 G/DL (ref 31.5–36.5)
MCV RBC AUTO: 96 FL (ref 78–100)
MONOCYTES # BLD AUTO: 0.4 10E9/L (ref 0–1.3)
MONOCYTES NFR BLD AUTO: 5 %
NEUTROPHILS # BLD AUTO: 4.4 10E9/L (ref 1.6–8.3)
NEUTROPHILS NFR BLD AUTO: 53.4 %
NRBC # BLD AUTO: 0 10*3/UL
NRBC BLD AUTO-RTO: 0 /100
PLATELET # BLD AUTO: 208 10E9/L (ref 150–450)
POTASSIUM SERPL-SCNC: 3.9 MMOL/L (ref 3.4–5.3)
PROT SERPL-MCNC: 8.2 G/DL (ref 6.8–8.8)
RBC # BLD AUTO: 4.14 10E12/L (ref 3.8–5.2)
SODIUM SERPL-SCNC: 143 MMOL/L (ref 133–144)
WBC # BLD AUTO: 8.2 10E9/L (ref 4–11)

## 2017-08-28 PROCEDURE — 97161 PT EVAL LOW COMPLEX 20 MIN: CPT | Mod: GP,ZF | Performed by: PHYSICAL THERAPIST

## 2017-08-28 PROCEDURE — T1013 SIGN LANG/ORAL INTERPRETER: HCPCS | Mod: U3,ZF | Performed by: PHYSICAL THERAPIST

## 2017-08-28 PROCEDURE — 97535 SELF CARE MNGMENT TRAINING: CPT | Mod: GP,ZF | Performed by: PHYSICAL THERAPIST

## 2017-08-28 PROCEDURE — 40000449 ZZHC STATISTIC PT VISIT, LYMPHEDEMA: Mod: ZF | Performed by: PHYSICAL THERAPIST

## 2017-08-28 NOTE — PROGRESS NOTES
[facility]        OUTPATIENT PHYSICAL THERAPY EDEMA EVALUATION  PLAN OF TREATMENT FOR OUTPATIENT REHABILITATION  (COMPLETE FOR INITIAL CLAIMS ONLY)  Patient's Last Name, First Name, Hoda Figueroa                           Provider s Name:   [facility] Medical Record No.  [id number]     Start of Care Date:  08/28/17   Onset Date:  07/14/14   Type:  PT   Medical Diagnosis:  Lymphedema, Breast Cancer   Therapy Diagnosis:  Lymphedema Visits from SOC:  1                                     __________________________________________________________________________________   Plan of Treatment/Functional Goals:    Manual lymph drainage, Gradient compression bandaging, Fit for compression garment, Exercises, Precautions to prevent infection / exacerbation, Education, Manual therapy, ADL training, Soft tissue mobilization, Home management program development, Myofascial release        GOALS  1. Goal description: Pt will obtain properly fitting compression and demonstrate understand of scheduled use and care for garment to prevent worsening edema and decrease risk of infection.        Target date: 11/25/17  2. Goal description: Pt will be independent in an edema home program including exercise and self massage to better manage chronic lymphedema.       Target date: 11/25/17            Treatment frequency:  3x/week for 4 weeks and 1 month follow-up x2       Vidya Hunter, PT                                    I CERTIFY THE NEED FOR THESE SERVICES FURNISHED UNDER        THIS PLAN OF TREATMENT AND WHILE UNDER MY CARE     (Physician co-signature of this document indicates review and certification of the therapy plan).                     Certification date from: 08/28/17       Certification date to: 11/25/17           Referring physician: Dr. Mcmahon  Stefania/Steffi Webb PA-C   Initial Assessment  See Epic Evaluation- Start of care: 08/28/17

## 2017-08-28 NOTE — PATIENT INSTRUCTIONS
You are scheduled for your biopsy on Thursday, August 31, 2017  Report at 10:00 AM  [address] [facility], 2nd floor (which is street level), Imaging   Your procedure will start approximately at 11:00 AM    No solid foods or milk products for 6 hours prior to the procedure 5:00 AM  You may have clear liquids until 2 hours prior to the procedure (this includes water, apple juice, broth, coffee or tea without milk or sugar, jello-o that is not red, white grape juice)  9:00 AM    You will need a       If you have any questions you may call the nurse at [phone]

## 2017-08-28 NOTE — PROGRESS NOTES
First Name: Hoda   Age: 62 year old   Referring Physician: Dr. Aguiar   REASON FOR REFERRAL: Consult for right iliac bone biopsy  Patient is here with a [place]onese     Assessment:  Hoda is a 62 yr with  stage IV, pT4b N2 M1, ER +, OK +, HEr2 3% + left breast cancer with skeletal mets.  No previous biopsy of the bone mets can be found in the records, so a biopsy of the right iliac bone is requested.    Plan:  Image guided biopsy of right iliac bone lesion.  Test of pathology, ER, OK, HER2.    HPI: This is a patient who is a [employer] refugee who arrived here from [facility] in July 2017.  Her daughter lives here in [place].  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  On 02/11/2014, she was seen by an oncologist.  The clinical staging of T4b N2 M1 was noted.   Her breast cancer was on the left side. There was no right breast cancer, confirmed by the patient and her daughter, correcting a possible error in the translated records.  ER was positive in 100% of the cells, OK at 14% and HER2 was 3+ positive.  It appears that this histopathologic information is on the biopsy specimen. She underwent an MRI for staging of presumptive bone metastases which was performed 03/02/2014.  There were skeletal metastases in the thoracic vertebrae at 12, L1, L3, L5 and S1 vertebral body, ranging in size from 0.7-3.0 cm in size.  Ischial and right femur were also involved. 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   Radiation Oncology consultation was performed 03/11/2014, and she was given radiation in 1 dose of 6 Gy to the right iliac lesion.       With the initial diagnosis, she initiated treatment with 6 cycles of CAF neoadjuvant chemotherapy 02/14, 07/07, 03/28, 04/18/14, 05/08 and 05/29/14. She also received monthly zoledronic acid.  She then underwent a modified left mastectomy of Palacios type and left lymphadenopathy on  06/27/2014.   Pathologic examination showed number at [facility] was [id number]/14 showed infiltrative grade 2 ductal cancer with 6 level 1 metastatic lymph nodes and 3 level 2 metastatic lymph nodes.  The differentiation was intermediate.  The tumor was ER positive 70% of the cells, AR positive in 40% of the cells and HER2 was 3+ by immunohistochemistry and the Ki-67 labeling index was 20%. Her staging after surgery was stage IV, pT4b N2 M1.  There does not appear to be a biopsy of the skeletal metastases.  She then had continuation with chemotherapy with 4 cycles of Herceptin and taxane with montly zoledronic acid.  Tamoxifen was initiated.  She then had two years of Herceptin and a decision was made not to continue further Herceptin.  She continued on zoledronic acid every 3 months. She was clinically stable. [place] then [place] the [place] new [place] from [place].  She established care with Dr. Aguiar.  She had a PET CT scan which continue to show multiple areas of bone mets.  Biopsy of the right iliac bone is requested.  Dr. Mccurdy from  reviewed the imaging and approved the biopsy      PAST MEDICAL HISTORY:   Past Medical History:   Diagnosis Date     Breast cancer metastasized to bone (H)      PAST SURGICAL HISTORY:   Past Surgical History:   Procedure Laterality Date     MASTECTOMY Left 06/27/2014    and lymph node resection     FAMILY HISTORY:   Family History   Problem Relation Age of Onset     Breast Cancer Mother 45     Lung Cancer Father      smoker     Breast Cancer Sister 61     SOCIAL HISTORY:   Social History   Substance Use Topics     Smoking status: Never Smoker     Smokeless tobacco: Never Used     Alcohol use No     PROBLEM LIST:   Patient Active Problem List    Diagnosis Date Noted     Malignant neoplasm of left female breast, unspecified estrogen receptor status, unspecified site of breast (H) 07/27/2017     Priority: Medium     MEDICATIONS:   Prescription Medications as of 8/28/2017              tamoxifen (NOLVADEX) 20 MG tablet Take 20 mg by mouth    Cholecalciferol (VITAMIN D3) 94403 UNITS TABS     TAMOXIFEN CITRATE PO     HYDROcodone-acetaminophen (NORCO) 5-325 MG per tablet Take 1 tablet by mouth every 8 hours as needed for moderate to severe pain        ALLERGIES: Review of patient's allergies indicates no known allergies.  VITALS: /79  Pulse 92  Wt 79.5 kg (175 lb 3.2 oz)  SpO2 98%  BMI 31.04 kg/m2    ROS:  Answers for HPI/ROS submitted by the patient on 8/27/2017   General Symptoms: No  Skin Symptoms: No  HENT Symptoms: No  EYE SYMPTOMS: No  HEART SYMPTOMS: No  LUNG SYMPTOMS: No  INTESTINAL SYMPTOMS: No  URINARY SYMPTOMS: No  GYNECOLOGIC SYMPTOMS: No  BREAST SYMPTOMS: No  SKELETAL SYMPTOMS: No  BLOOD SYMPTOMS: No  NERVOUS SYSTEM SYMPTOMS: No  MENTAL HEALTH SYMPTOMS: No    Physical Examination: Vital signs are reviewed and they are stable  Constitutional: Pleasant, middle aged woman, in no acute physical distress, came with  to the appt  Cardiovascular: negative  Respiratory: negative  Musculoskeletal: extremities normal- no gross deformities noted, gait normal and normal muscle tone  Skin: no suspicious lesions or rashes  Neurologic: negative  Psychiatric: affect normal/bright and mentation appears normal.    BMP RESULTS:  Lab Results   Component Value Date     08/28/2017    POTASSIUM 3.9 08/28/2017    CHLORIDE 108 08/28/2017    CO2 27 08/28/2017    ANIONGAP 7 08/28/2017    GLC 85 08/28/2017    BUN 15 08/28/2017    CR 0.75 08/28/2017    GFRESTIMATED 79 08/28/2017    GFRESTBLACK >90 08/28/2017    TAYLER 8.6 08/28/2017        CBC RESULTS:  Lab Results   Component Value Date    WBC 8.2 08/28/2017    RBC 4.14 08/28/2017    HGB 12.4 08/28/2017    HCT 39.6 08/28/2017    MCV 96 08/28/2017    MCH 30.0 08/28/2017    MCHC 31.3 (L) 08/28/2017    RDW 13.0 08/28/2017     08/28/2017       INR/PTT:  Lab Results   Component Value Date    INR 0.99 08/28/2017       Diagnostic  studies: see recent PET CT scan    PROVIDER NOTE:  I explained the procedure to Hoda through the .  This included:  Preparing for the procedure, the actual procedure and recovery.  I explained the risk of the bone biopsy:  Bleeding, infection, pain, hitting an unintended organ (blood vessel or nerve).  I explained that usually the results return after four to seven business days.    I explained that he/she would be contacted by Dr. Auguste's  office following this to determine a future plan.  Thank you for involving us in the care of your patient.    30 minutes was spent with Hoda and the .  20 minutes was spent in counseling.  Anjali Roche MS, APRN, CNS  Clinical Nurse Specialist  Interventional Radiology  [phone] (voice mail)  [phone] (pager)    CC  Patient Care Team:  No Ref-Primary, Physician as PCP - General  Lisandro Auguste MD as MD (Oncology)  Anjali Roche APRN CNS as Nurse Practitioner (Nurse)  LISANDRO AUGUSTE

## 2017-08-28 NOTE — MR AVS SNAPSHOT
After Visit Summary   8/28/2017    Hoda Brush    MRN: [id number]           Patient Information     Date Of Birth          1955        Visit Information        Provider Department      8/28/2017 3:15 PM Anjali Roche APRN CNS; [employer]ULISSA [employer]ODRIGUEZ [employer] [facility] Interventional Radiology        Today's Diagnoses     Bone lesion    -  1    Malignant neoplasm of left female breast, unspecified estrogen receptor status, unspecified site of breast (H)          Care Instructions    You are scheduled for your biopsy on Thursday, August 31, 2017  Report at 10:00 AM  [facility], 2nd floor (which is street level), Imaging   Your procedure will start approximately at 11:00 AM    No solid foods or milk products for 6 hours prior to the procedure 5:00 AM  You may have clear liquids until 2 hours prior to the procedure (this includes water, apple juice, broth, coffee or tea without milk or sugar, jello-o that is not red, white grape juice)  9:00 AM    You will need a       If you have any questions you may call the nurse at [phone]          Follow-ups after your visit        Your next 10 appointments already scheduled     Aug 28, 2017  4:45 PM CDT   Lymphedema Evaluation with Vidya Hunter, GREG   [facility] ([facility])    [address] [place] [address]  2nd [facility] [address] [phone]            Aug 29, 2017  2:00 PM CDT   Ech Complete with UCECHCR2   [facility] ([facility])    [address] [place] [address]  3rd Floor  [facility] [address] [phone]           1. Please bring or wear a comfortable two-piece outfit. 2. You may eat, drink and take your normal medicines. 3. For any questions that cannot be answered, please contact the ordering physician            Aug 29, 2017  3:00 PM CDT   [facility] Lab Draw with [facility] LAB DRAW   [facility] (  [facility])    [address] [facility]  2nd [facility] [address]   [phone]            Aug 29, 2017  3:15 PM CDT   Return Visit with Lisandro Aguiar MD   [facility] ([facility])    [address] [place] [address]   [phone]            Aug 31, 2017 10:00 AM CDT   CT BONE BIOPSY DEEP with UR IMAGING NURSE   [facility]    [facility] [address] [phone]           Plan for an adult to drive you home and stay with you until morning.  Tell your doctor in advance:   If you have any allergies.   If you are breastfeeding or there s any chance you are pregnant.  Please bring any scans or X-rays taken at other hospitals, if they may be helpful. Also bring a list of your medicines, including vitamins, minerals and over-the-counter drugs. It is safest to leave valuables at home.  If you take blood thinners, you may need to stop taking them a few days before treatment. Talk to your doctor before stopping these medicines. You will need a blood test the morning of your exam.   Stop taking Coumadin (warfarin) 5 days before treatment. Restart the day after treatment.   If you take aspirin, you may need to stop taking it 3 days before treatment.   If you take Plavix, Ticlid, Pletal or Persantine, you may need to stop taking them 5 days before your scan.  If you have diabetes:   If you take insulin, call your diabetes care team. Ask if you should adjust your insulin before this test.   If your kidney function is normal, continue taking your metformin (Avandamet, Glucophage, Glucovance, Metaglip) on the day of your exam.   If your kidney function is abnormal, wait 48 hours before restarting this medicine.  If you have any questions, please call the imaging department where you will have your exam.  The day before  your exam: Drink extra fluids at least six 8-ounce glasses (unless your doctor tells you to restrict fluids). The day of your exam: No eating or drinking for 4 hours before your test. You may take medicine with small sips of water.            Aug 31, 2017 11:00 AM CDT   CT BONE BIOPSY DEEP with URCT2, UR IR RAD   [facility] [place], Radiology ([facility])    [facility] [address] [phone]           Plan for an adult to drive you home and stay with you until morning.  Tell your doctor in advance:   If you have any allergies.   If you are breastfeeding or there s any chance you are pregnant.  Please bring any scans or X-rays taken at other hospitals, if they may be helpful. Also bring a list of your medicines, including vitamins, minerals and over-the-counter drugs. It is safest to leave valuables at home.  If you take blood thinners, you may need to stop taking them a few days before treatment. Talk to your doctor before stopping these medicines. You will need a blood test the morning of your exam.   Stop taking Coumadin (warfarin) 5 days before treatment. Restart the day after treatment.   If you take aspirin, you may need to stop taking it 3 days before treatment.   If you take Plavix, Ticlid, Pletal or Persantine, you may need to stop taking them 5 days before your scan.  If you have diabetes:   If you take insulin, call your diabetes care team. Ask if you should adjust your insulin before this test.   If your kidney function is normal, continue taking your metformin (Avandamet, Glucophage, Glucovance, Metaglip) on the day of your exam.   If your kidney function is abnormal, wait 48 hours before restarting this medicine.  If you have any questions, please call the imaging department where you will have your exam.  The day before your exam: Drink extra fluids at least six 8-ounce glasses (unless your doctor tells you to restrict fluids). The  day of your exam: No eating or drinking for 4 hours before your test. You may take medicine with small sips of water.            Sep 19, 2017  2:15 PM CDT   (Arrive by 2:00 PM)   NEW WITH ROOM with Lianne Saavedra GC,  2 118 CONSULT RM   [facility] ([facility])    [address] [place] [address]  2nd [facility] [address] [phone]            Sep 19, 2017  3:45 PM CDT   [facility] Lab Draw with [facility] LAB DRAW   [facility] Lab Draw ([facility])    [address] [address] [phone]              Future tests that were ordered for you today     Open Future Orders        Priority Expected Expires Ordered    Hemoglobin and hematocrit Routine  8/28/2018 8/28/2017    Platelet count Routine  8/28/2018 8/28/2017    INR Routine  8/28/2018 8/28/2017    CT Bone Biopsy Deep Routine  8/28/2018 8/28/2017            Who to contact     Please call your clinic at [phone] to:    Ask questions about your health    Make or cancel appointments    Discuss your medicines    Learn about your test results    Speak to your doctor   If you have compliments or concerns about an experience at your clinic, or if you wish to file a complaint, please contact [facility] Patient Relations at [phone] or email us at [email or web address]         Additional Information About Your Visit        [employer] Information     [employer] gives you secure access to your electronic health record. If you see a primary care provider, you can also send messages to your care team and make appointments. If you have questions, please call your primary care clinic.  If you do not have a primary care provider, please call [phone] and they will assist you.      Apiary is an electronic gateway that provides easy, online access to your medical records. With [employer]pi[employer], you can request a clinic appointment,  read your test results, renew a prescription or communicate with your care team.     To access your existing account, please contact your [facility] or call [phone] for assistance.        Care EveryWhere ID     This is your Care EveryWhere ID. This could be used by other organizations to access your [place] medical records  [id number]        Your Vitals Were     Pulse Pulse Oximetry BMI (Body Mass Index)             92 98% 31.04 kg/m2          Blood Pressure from Last 3 Encounters:   08/28/17 132/79   08/15/17 127/77   08/08/17 119/78    Weight from Last 3 Encounters:   08/28/17 79.5 kg (175 lb 3.2 oz)   08/15/17 79.5 kg (175 lb 4.8 oz)   08/08/17 80.7 kg (178 lb)              We Performed the Following     IR Consultation for IR Exam        Primary Care Provider    Physician No Ref-Primary       No address on file        Equal Access to Services     [facility] : Hadii kamran Ramsey, waaxda li, qaybta kaalmada franky, alexa angulo . So [facility] [phone].    ATENCIÓN: Si habla español, tiene a lagunas disposición servicios gratuitos de asistencia lingüística. Llame al [phone].    We comply with applicable federal civil rights laws and [place] laws. We do not discriminate on the basis of race, color, national origin, age, disability sex, sexual orientation or gender identity.            Thank you!     Thank you for choosing [facility] INTERVENTIONAL RADIOLOGY  for your care. Our goal is always to provide you with excellent care. Hearing back from our patients is one way we can continue to improve our services. Please take a few minutes to complete the written survey that you may receive in the mail after your visit with us. Thank you!             Your Updated Medication List - Protect others around you: Learn how to safely use, store and throw away your medicines at www.disposemymeds.org.          This list is accurate as of:  8/28/17  3:47 PM.  Always use your most recent med list.                   Brand Name Dispense Instructions for use Diagnosis    HYDROcodone-acetaminophen 5-325 MG per tablet    NORCO    8 tablet    Take 1 tablet by mouth every 8 hours as needed for moderate to severe pain        * TAMOXIFEN CITRATE PO           * tamoxifen 20 MG tablet    NOLVADEX     Take 20 mg by mouth        Vitamin D3 60687 UNITS Tabs           * Notice:  This list has 2 medication(s) that are the same as other medications prescribed for you. Read the directions carefully, and ask your doctor or other care provider to review them with you.

## 2017-08-28 NOTE — LETTER
8/28/2017       RE: Hoda Brush  [address] [facility] [address]     Dear Colleague,    Thank you for referring your patient, Hoda Brush, to the [facility] INTERVENTIONAL RADIOLOGY at [facility]. Please see a copy of my visit note below.    First Name: Hoda   Age: 62 year old   Referring Physician: Dr. Aguiar   REASON FOR REFERRAL: Consult for right iliac bone biopsy  Patient is here with a Tristanian     Assessment:  Hoda is a 62 yr with  stage IV, pT4b N2 M1, ER +, SD +, HEr2 3% + left breast cancer with skeletal mets.  No previous biopsy of the bone mets can be found in the records, so a biopsy of the right iliac bone is requested.    Plan:  Image guided biopsy of right iliac bone lesion.  Test of pathology, ER, SD, HER2.    HPI: This is a patient who is a Evangelical refugee who arrived here from [facility] in July 2017.  Her daughter lives here in [place].  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  On 02/11/2014, she was seen by an oncologist.  The clinical staging of T4b N2 M1 was noted.   Her breast cancer was on the left side. There was no right breast cancer, confirmed by the patient and her daughter, correcting a possible error in the translated records.  ER was positive in 100% of the cells, SD at 14% and HER2 was 3+ positive.  It appears that this histopathologic information is on the biopsy specimen. She underwent an MRI for staging of presumptive bone metastases which was performed 03/02/2014.  There were skeletal metastases in the thoracic vertebrae at 12, L1, L3, L5 and S1 vertebral body, ranging in size from 0.7-3.0 cm in size.  Ischial and right femur were also involved. 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   Radiation Oncology consultation was performed 03/11/2014, and she was given radiation in 1 dose of 6 Gy to the right iliac  lesion.       With the initial diagnosis, she initiated treatment with 6 cycles of CAF neoadjuvant chemotherapy 02/14, 07/07, 03/28, 04/18/14, 05/08 and 05/29/14. She also received monthly zoledronic acid.  She then underwent a modified left mastectomy of [place]ac[place] type and left lymphadenopathy on 06/27/2014.   Pathologic examination showed number at [facility] was [id number] showed infiltrative grade 2 ductal cancer with 6 level 1 metastatic lymph nodes and 3 level 2 metastatic lymph nodes.  The differentiation was intermediate.  The tumor was ER positive 70% of the cells, NJ positive in 40% of the cells and HER2 was 3+ by immunohistochemistry and the Ki-67 labeling index was 20%. Her staging after surgery was stage IV, pT4b N2 M1.  There does not appear to be a biopsy of the skeletal metastases.  She then had continuation with chemotherapy with 4 cycles of Herceptin and taxane with montly zoledronic acid.  Tamoxifen was initiated.  She then had two years of Herceptin and a decision was made not to continue further Herceptin.  She continued on zoledronic acid every 3 months. She was clinically stable. She then [place] the [place] new refugee from [facility].  She  established care with Dr. Aguiar.  She had a PET CT scan which continue to show multiple areas of bone mets.  Biopsy of the right iliac bone is requested.  Dr. Mccurdy from  reviewed the imaging and approved the biopsy      PAST MEDICAL HISTORY:   Past Medical History:   Diagnosis Date     Breast cancer metastasized to bone (H)      PAST SURGICAL HISTORY:   Past Surgical History:   Procedure Laterality Date     MASTECTOMY Left 06/27/2014    and lymph node resection     FAMILY HISTORY:   Family History   Problem Relation Age of Onset     Breast Cancer Mother 45     Lung Cancer Father      smoker     Breast Cancer Sister 61     SOCIAL HISTORY:   Social History   Substance Use Topics     Smoking status: Never Smoker     Smokeless tobacco: Never Used      Alcohol use No     PROBLEM LIST:   Patient Active Problem List    Diagnosis Date Noted     Malignant neoplasm of left female breast, unspecified estrogen receptor status, unspecified site of breast (H) 07/27/2017     Priority: Medium     MEDICATIONS:   Prescription Medications as of 8/28/2017             tamoxifen (NOLVADEX) 20 MG tablet Take 20 mg by mouth    Cholecalciferol (VITAMIN D3) 22796 UNITS TABS     TAMOXIFEN CITRATE PO     HYDROcodone-acetaminophen (NORCO) 5-325 MG per tablet Take 1 tablet by mouth every 8 hours as needed for moderate to severe pain        ALLERGIES: Review of patient's allergies indicates no known allergies.  VITALS: /79  Pulse 92  Wt 79.5 kg (175 lb 3.2 oz)  SpO2 98%  BMI 31.04 kg/m2    ROS:    Physical Examination: Vital signs are reviewed and they are stable  Constitutional: Pleasant, middle aged woman, in no acute physical distress, came with  to the appt  Cardiovascular: negative  Respiratory: negative  Musculoskeletal: extremities normal- no gross deformities noted, gait normal and normal muscle tone  Skin: no suspicious lesions or rashes  Neurologic: negative  Psychiatric: affect normal/bright and mentation appears normal.    BMP RESULTS:  Lab Results   Component Value Date     08/28/2017    POTASSIUM 3.9 08/28/2017    CHLORIDE 108 08/28/2017    CO2 27 08/28/2017    ANIONGAP 7 08/28/2017    GLC 85 08/28/2017    BUN 15 08/28/2017    CR 0.75 08/28/2017    GFRESTIMATED 79 08/28/2017    GFRESTBLACK >90 08/28/2017    TAYLER 8.6 08/28/2017        CBC RESULTS:  Lab Results   Component Value Date    WBC 8.2 08/28/2017    RBC 4.14 08/28/2017    HGB 12.4 08/28/2017    HCT 39.6 08/28/2017    MCV 96 08/28/2017    MCH 30.0 08/28/2017    MCHC 31.3 (L) 08/28/2017    RDW 13.0 08/28/2017     08/28/2017       INR/PTT:  Lab Results   Component Value Date    INR 0.99 08/28/2017       Diagnostic studies: see recent PET CT scan    PROVIDER NOTE:  I explained the procedure  to Hoda through the .  This included:  Preparing for the procedure, the actual procedure and recovery.  I explained the risk of the bone biopsy:  Bleeding, infection, pain, hitting an unintended organ (blood vessel or nerve).  I explained that usually the results return after four to seven business days.    I explained that he/she would be contacted by Dr. Auguste's  office following this to determine a future plan.  Thank you for involving us in the care of your patient.    30 minutes was spent with Hoda and the .  20 minutes was spent in counseling.    CC  Patient Care Team:  ASHWIN AUGUSTE    Again, thank you for allowing me to participate in the care of your patient.    Sincerely,  KATHI Perdue CNS

## 2017-08-29 ENCOUNTER — RADIANT APPOINTMENT (OUTPATIENT)
Dept: CARDIOLOGY | Facility: CLINIC | Age: 62
End: 2017-08-29
Attending: INTERNAL MEDICINE

## 2017-08-29 DIAGNOSIS — C50.912 MALIGNANT NEOPLASM OF LEFT FEMALE BREAST, UNSPECIFIED ESTROGEN RECEPTOR STATUS, UNSPECIFIED SITE OF BREAST (H): ICD-10-CM

## 2017-08-29 DIAGNOSIS — Z51.11 ENCOUNTER FOR ANTINEOPLASTIC CHEMOTHERAPY: ICD-10-CM

## 2017-08-30 RX ORDER — LIDOCAINE 40 MG/G
CREAM TOPICAL
Status: CANCELLED | OUTPATIENT
Start: 2017-08-30

## 2017-08-31 ENCOUNTER — HOSPITAL ENCOUNTER (OUTPATIENT)
Dept: GENERAL RADIOLOGY | Facility: CLINIC | Age: 62
End: 2017-08-31
Attending: INTERNAL MEDICINE | Admitting: INTERNAL MEDICINE
Payer: MEDICAID

## 2017-08-31 ENCOUNTER — HOSPITAL ENCOUNTER (OUTPATIENT)
Dept: CT IMAGING | Facility: CLINIC | Age: 62
End: 2017-08-31
Attending: CLINICAL NURSE SPECIALIST
Payer: MEDICAID

## 2017-08-31 ENCOUNTER — HOSPITAL ENCOUNTER (OUTPATIENT)
Facility: CLINIC | Age: 62
Discharge: HOME | End: 2017-08-31
Attending: INTERNAL MEDICINE | Admitting: INTERNAL MEDICINE
Payer: MEDICAID

## 2017-08-31 VITALS
RESPIRATION RATE: 15 BRPM | SYSTOLIC BLOOD PRESSURE: 110 MMHG | OXYGEN SATURATION: 100 % | DIASTOLIC BLOOD PRESSURE: 72 MMHG

## 2017-08-31 DIAGNOSIS — M89.9 BONE LESION: ICD-10-CM

## 2017-08-31 DIAGNOSIS — C50.912 MALIGNANT NEOPLASM OF LEFT FEMALE BREAST, UNSPECIFIED ESTROGEN RECEPTOR STATUS, UNSPECIFIED SITE OF BREAST (H): ICD-10-CM

## 2017-08-31 PROCEDURE — 77012 CT SCAN FOR NEEDLE BIOPSY: CPT

## 2017-08-31 PROCEDURE — S0020 INJECTION, BUPIVICAINE HYDRO: HCPCS | Performed by: RADIOLOGY

## 2017-08-31 PROCEDURE — 99152 MOD SED SAME PHYS/QHP 5/>YRS: CPT

## 2017-08-31 PROCEDURE — 88342 IMHCHEM/IMCYTCHM 1ST ANTB: CPT | Performed by: INTERNAL MEDICINE

## 2017-08-31 PROCEDURE — 88307 TISSUE EXAM BY PATHOLOGIST: CPT | Performed by: INTERNAL MEDICINE

## 2017-08-31 PROCEDURE — 88311 DECALCIFY TISSUE: CPT | Performed by: INTERNAL MEDICINE

## 2017-08-31 PROCEDURE — C1830 POWER BONE MARROW BX NEEDLE: HCPCS

## 2017-08-31 PROCEDURE — 25000128 H RX IP 250 OP 636: Performed by: RADIOLOGY

## 2017-08-31 PROCEDURE — 99153 MOD SED SAME PHYS/QHP EA: CPT

## 2017-08-31 PROCEDURE — 88307 TISSUE EXAM BY PATHOLOGIST: CPT | Mod: 26 | Performed by: INTERNAL MEDICINE

## 2017-08-31 PROCEDURE — 88311 DECALCIFY TISSUE: CPT | Mod: 26 | Performed by: INTERNAL MEDICINE

## 2017-08-31 PROCEDURE — 88341 IMHCHEM/IMCYTCHM EA ADD ANTB: CPT | Performed by: INTERNAL MEDICINE

## 2017-08-31 PROCEDURE — 88342 IMHCHEM/IMCYTCHM 1ST ANTB: CPT | Mod: 26 | Performed by: INTERNAL MEDICINE

## 2017-08-31 PROCEDURE — 88341 IMHCHEM/IMCYTCHM EA ADD ANTB: CPT | Mod: 26 | Performed by: INTERNAL MEDICINE

## 2017-08-31 PROCEDURE — T1013 SIGN LANG/ORAL INTERPRETER: HCPCS | Mod: U3

## 2017-08-31 PROCEDURE — 25000125 ZZHC RX 250: Performed by: RADIOLOGY

## 2017-08-31 RX ORDER — FLUMAZENIL 0.1 MG/ML
0.2 INJECTION, SOLUTION INTRAVENOUS
Status: DISCONTINUED | OUTPATIENT
Start: 2017-08-31 | End: 2017-09-05 | Stop reason: HOSPADM

## 2017-08-31 RX ORDER — NALOXONE HYDROCHLORIDE 0.4 MG/ML
.1-.4 INJECTION, SOLUTION INTRAMUSCULAR; INTRAVENOUS; SUBCUTANEOUS
Status: DISCONTINUED | OUTPATIENT
Start: 2017-08-31 | End: 2017-09-05 | Stop reason: HOSPADM

## 2017-08-31 RX ORDER — BUPIVACAINE HYDROCHLORIDE 2.5 MG/ML
10 INJECTION, SOLUTION EPIDURAL; INFILTRATION; INTRACAUDAL ONCE
Status: COMPLETED | OUTPATIENT
Start: 2017-08-31 | End: 2017-08-31

## 2017-08-31 RX ORDER — FENTANYL CITRATE 50 UG/ML
25-50 INJECTION, SOLUTION INTRAMUSCULAR; INTRAVENOUS EVERY 5 MIN PRN
Status: DISCONTINUED | OUTPATIENT
Start: 2017-08-31 | End: 2017-09-05 | Stop reason: HOSPADM

## 2017-08-31 RX ADMIN — MIDAZOLAM HYDROCHLORIDE 1 MG: 1 INJECTION, SOLUTION INTRAMUSCULAR; INTRAVENOUS at 12:18

## 2017-08-31 RX ADMIN — FENTANYL CITRATE 50 MCG: 50 INJECTION, SOLUTION INTRAMUSCULAR; INTRAVENOUS at 12:11

## 2017-08-31 RX ADMIN — FENTANYL CITRATE 50 MCG: 50 INJECTION, SOLUTION INTRAMUSCULAR; INTRAVENOUS at 12:17

## 2017-08-31 RX ADMIN — MIDAZOLAM HYDROCHLORIDE 1 MG: 1 INJECTION, SOLUTION INTRAMUSCULAR; INTRAVENOUS at 12:11

## 2017-08-31 RX ADMIN — BUPIVACAINE HYDROCHLORIDE 25 MG: 2.5 INJECTION, SOLUTION EPIDURAL; INFILTRATION; INTRACAUDAL at 12:28

## 2017-08-31 NOTE — DISCHARGE INSTRUCTIONS
General acute hospital  Interventional Radiology  Patient Instructions Following Biopsy    AFTER YOU GO HOME:    If you were given sedation DO NOT drive or operate machinery at home or at work for at least 24 hours    DO relax and take it easy for 48 hours, no strenuous activity for 24 hours    DO drink plenty of fluids    DO resume your regular diet, unless otherwise instructed by your Primary Physician    Keep the dressing dry and in place for 24 hours to prevent the site from re-opening and bleeding    DO NOT SMOKE FOR AT LEAST 24 HOURS. If you have been given any mediations that were to help you relax or sedate youd uring your procedure    DO NOT drink alcoholic beverages the day of your procedure    DO NOT take Aspirin or Ibuprofen for 3 days    DO NOT do any strenuous exercise or lifting (>10 lbs) for at least 7 days following your procedure    DO NOT take a bath or shower for at least 12 hours following your procedure    DO NOT make any important or legal decisions for 24 hours following your procedure    There should be minimum drainage from the biopsy site    CALL THE PHYSICIAN IF:    You start bleeding from procedure site. If you do start to bleed from that site, lie down flat and hold pressure on the site for a minimum of 10 minutes. Your physician will tell you if you need to return to the hospital    You develop nausea or vomiting    You have excessive swelling, redness, or tenderness at the site    You have drainage that is green, yellow, thick white, or has a bad odor    You experience severe pain    You develop hives or a rash or unexplained itching    You develop shortness of breath    You develop a temperature of 101 degrees F or greater    You develop chest pain or cough up blood, lightheadedness or fainting    ADDITIONAL INSTRUCTIONS:      [facility] INTERVENTIONAL RADIOLOGY DEPARTMENT:    Procedure Physician  Dr. Blevins Date of Procedure:  08/31/17    Telephone Numbers  [phone] Monday-Friday 8:00 am to 4:30 pm    [phone] After 4:30 pm Monday-Friday, Weekends & Holidays  Ask for Interventional Radiologist on call. Someone is on call 24 hours/day   [facility] toll free number [phone] Monday-Friday 8:00 am to 4:30 pm   [facility] Emergency Dept [phone]             I have received and understand my discharge instructions and I have all of my personal belongings.      __________________________________________________      Patient/Significant Other's Signature     Relationship        __________________________________________________  Nurse/Radiologist Signature  8/31/2017

## 2017-08-31 NOTE — PROGRESS NOTES
Interventional Radiology Pre-Procedure Sedation Assessment   Time of Assessment: 11:47 AM    Expected Level: Moderate Sedation    Indication: Sedation is required for the following type of Procedure: Biopsy    Sedation and procedural consent: Risks, benefits and alternatives were discussed with Patient    PO Intake: Appropriately NPO for procedure    ASA Class: Class 3 - SEVERE SYSTEMIC DISEASE, DEFINITE FUNCTIONAL LIMITATIONS.    Mallampati: Grade 2:  Soft palate, base of uvula, tonsillar pillars, and portion of posterior pharyngeal wall visible    Lungs: Lungs Clear with good breath sounds bilaterally    Heart: Normal heart sounds and rate    History and physical reviewed and no updates needed. I have reviewed the lab findings, diagnostic data, medications, and the plan for sedation. I have determined this patient to be an appropriate candidate for the planned sedation and procedure and have reassessed the patient IMMEDIATELY PRIOR to sedation and procedure.    Cecilio Blevins MD

## 2017-08-31 NOTE — PROCEDURES
Interventional Radiology Brief Post Procedure Note    Procedure:     Proceduralist: Cecilio Blevins MD    Assistant: None    Time Out: Prior to the start of the procedure and with procedural staff participation, I verbally confirmed the patient s identity using two indicators, relevant allergies, that the procedure was appropriate and matched the consent or emergent situation, and that the correct equipment/implants were available. Immediately prior to starting the procedure I conducted the Time Out with the procedural staff and re-confirmed the patient s name, procedure, and site/side. (The Joint Commission universal protocol was followed.)  Yes        Sedation: IR Nurse Monitored Care   Post Procedure Summary:  Prior to the start of the procedure and with procedural staff participation, I verbally confirmed the patient s identity using two indicators, relevant allergies, that the procedure was appropriate and matched the consent or emergent situation, and that the correct equipment/implants were available. Immediately prior to starting the procedure I conducted the Time Out with the procedural staff and re-confirmed the patient s name, procedure, and site/side. (The Joint Commission universal protocol was followed.)  Yes       Sedatives: Fentanyl and Midazolam (Versed)    Vital signs, airway and pulse oximetry were monitored and remained stable throughout the procedure and sedation was maintained until the procedure was complete.  The patient was monitored by staff until sedation discharge criteria were met.    Patient tolerance: Patient tolerated the procedure well with no immediate complications.    Time of sedation in minutes: 30 Minutes minutes from beginning to end of physician one to one monitoring.          Findings: Successful biopsy of the right iliac PET avid lesion.     Estimated Blood Loss: Less than 10 ml    Fluoroscopy Time:  minute(s)    SPECIMENS: Core needle biopsy specimens sent for pathological  analysis    Complications: 1. None     Condition: Stable    Plan: Patient to recover and follow up with oncology for biopsy results.     Comments: See dictated procedure note for full details.    Cecilio Blevins MD

## 2017-08-31 NOTE — IP AVS SNAPSHOT
MRN:[id number]                      After Visit Summary   8/31/2017    Hoda Brush    MRN: [id number]           Visit Information        Provider Department      8/31/2017 10:45 AM Delia Steve; [facility]; [facility]  Services Department           Review of your medicines      Notice     This visit is during an admission. Changes to the med list made in this visit will be reflected in the After Visit Summary of the admission.             Protect others around you: Learn how to safely use, store and throw away your medicines at www.disposemymeds.org.         Follow-ups after your visit        Your next 10 appointments already scheduled     Sep 19, 2017  2:15 PM CDT   (Arrive by 2:00 PM)   NEW WITH ROOM with Lianne Saavedra GC,  2 118 CONSULT RM   [facility] ([facility])    [address] [place] [address] [id number] [phone]            Sep 19, 2017  3:45 PM CDT   [facility] with [facility] LAB DRAW   [facility] Draw ([facility])    [address] [place] [address] [id number] [phone]               Care Instructions        Further instructions from your care team       [facility]  Interventional Radiology  Patient Instructions Following Biopsy    AFTER YOU GO HOME:    If you were given sedation DO NOT drive or operate machinery at home or at work for at least 24 hours    DO relax and take it easy for 48 hours, no strenuous activity for 24 hours    DO drink plenty of fluids    DO resume your regular diet, unless otherwise instructed by your Primary Physician    Keep the dressing dry and in place for 24 hours to prevent the site from re-opening and bleeding    DO NOT SMOKE FOR AT LEAST 24 HOURS. If you have been given any mediations that were to help you relax or sedate youd uring your procedure    DO NOT drink alcoholic  beverages the day of your procedure    DO NOT take Aspirin or Ibuprofen for 3 days    DO NOT do any strenuous exercise or lifting (>10 lbs) for at least 7 days following your procedure    DO NOT take a bath or shower for at least 12 hours following your procedure    DO NOT make any important or legal decisions for 24 hours following your procedure    There should be minimum drainage from the biopsy site    CALL THE PHYSICIAN IF:    You start bleeding from procedure site. If you do start to bleed from that site, lie down flat and hold pressure on the site for a minimum of 10 minutes. Your physician will tell you if you need to return to the hospital    You develop nausea or vomiting    You have excessive swelling, redness, or tenderness at the site    You have drainage that is green, yellow, thick white, or has a bad odor    You experience severe pain    You develop hives or a rash or unexplained itching    You develop shortness of breath    You develop a temperature of 101 degrees F or greater    You develop chest pain or cough up blood, lightheadedness or fainting    ADDITIONAL INSTRUCTIONS:      [facility] INTERVENTIONAL RADIOLOGY DEPARTMENT:    Procedure Physician  Dr. Blevins Date of Procedure:  08/31/17    Telephone Numbers [phone] Monday-Friday 8:00 am to 4:30 pm    [phone] After 4:30 pm Monday-Friday, Weekends & Holidays  Ask for Interventional Radiologist on call. Someone is on call 24 hours/day   [facility] toll free number [phone] Monday-Friday 8:00 am to 4:30 pm   [facility] Emergency Dept [phone]             I have received and understand my discharge instructions and I have all of my personal belongings.      __________________________________________________      Patient/Significant Other's Signature     Relationship        __________________________________________________  Nurse/Radiologist Signature  8/31/2017     Additional Information About Your Visit        [employer] Information     [employer] gives  you secure access to your electronic health record. If you see a primary care provider, you can also send messages to your care team and make appointments. If you have questions, please call your primary care clinic.  If you do not have a primary care provider, please call [phone] and they will assist you.        Care EveryWhere ID     This is your Care EveryWhere ID. This could be used by other organizations to access your [place] medical records  [id number]        Your Vitals Were     Blood Pressure Respirations Pulse Oximetry             110/72 (BP Location: Right arm) 15 100%          Primary Care Provider    Physician No Ref-Primary      Equal Access to Services     [facility]: Hadii aad edilberto hadasho Soomaali, waaxda luqadaha, qaybta kaalmada franky, alexa angulo . So [facility] [phone].    ATENCIÓN: Si habla español, tiene a lagunas disposición servicios gratuitos de asistencia lingüística. Llame al [phone].    We comply with applicable federal civil rights laws and [place] laws. We do not discriminate on the basis of race, color, national origin, age, disability sex, sexual orientation or gender identity.            Thank you!     Thank you for choosing [place] for your care. Our goal is always to provide you with excellent care. Hearing back from our patients is one way we can continue to improve our services. Please take a few minutes to complete the written survey that you may receive in the mail after you visit with us. Thank you!             Medication List: This is a list of all your medications and when to take them. Check marks below indicate your daily home schedule. Keep this list as a reference.      Notice     This visit is during an admission. Changes to the med list made in this visit will be reflected in the After Visit Summary of the admission.

## 2017-08-31 NOTE — IP AVS SNAPSHOT
[facility], [place], Radiology    [address] [address]    Phone:  [phone]                                       After Visit Summary   8/31/2017    Hoda Brush    MRN: [id number]           After Visit Summary Signature Page     I have received my discharge instructions, and my questions have been answered. I have discussed any challenges I see with this plan with the nurse or doctor.    ..........................................................................................................................................  Patient/Patient Representative Signature      ..........................................................................................................................................  Patient Representative Print Name and Relationship to Patient    ..................................................               ................................................  Date                                            Time    ..........................................................................................................................................  Reviewed by Signature/Title    ...................................................              ..............................................  Date                                                            Time

## 2017-08-31 NOTE — PROGRESS NOTES
Interventional Radiology Intra-procedural Nursing Note    Patient Name: Hoda Brush  Medical Record Number: [id number]  Today's Date: August 31, 2017    Start Time: 1200  End of procedure time: 1230  Procedure: right iliac bone biopsy  Report given to: same  Time pt departs:  1430  :     Other Notes: tolerated procedure well. Minimal discomfort. No bleeding from puncture site.  here for the entire time. Denies any pain. Vitals remained stable. Given versed 2 mg and fentanyl 100 mcg total. Given discharge instructions with .       Grayson Guerin

## 2017-09-07 ENCOUNTER — TELEPHONE (OUTPATIENT)
Dept: ONCOLOGY | Facility: CLINIC | Age: 62
End: 2017-09-07

## 2017-09-07 LAB — COPATH REPORT: NORMAL

## 2017-09-07 NOTE — TELEPHONE ENCOUNTER
I called Hoda and her daughter Ashleigh and spoke with her daughter who can translate.  I told Ashleigh that the bone biopsy from her mother's hip showed no evidence of cancer and we might need to biopsy another site.  We discussed that it would be best for us to obtain the original pathology report and HER2 report in Nigerian from the hospital in [facility].  She understands and the family will try to obtain the original reports for the upcoming visit on September 14.  I also discussed that the brain MRI showed no metastases which is good news. All of their questions were answered.    Lisandro Aguiar MD

## 2017-09-10 NOTE — PROGRESS NOTES
HISTORY OF PRESENT ILLNESS:        Hoda is seen with her daughter, Ashleigh, today for a new patient visit.  The patient is a refugee from [facility] and is here for continuation of care for her metastatic ER+HER2- breast cancer.  She is a Je[employer]'s witness refugee from [facility] and was seen in clinic with her daughter.  The only data we have from [facility] is an English translation of her records.       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  On 02/11/2014, she was seen by an oncologist.  The clinical staging of T4b N2 M1 was noted.   Her breast cancer was on the left side. There was no right breast cancer, confirmed by the patient and her daughter, correcting a possible error in the translated records.  ER was positive in 100% of the cells, VT at 14% and HER2 was 3+ positive.  It appears that this histopathologic information is on the mastectomy specimen. She underwent an MRI for staging of presumptive bone metastases which was performed 03/02/2014.  There were skeletal metastases in the thoracic vertebrae at 12, L1, L3, L5 and S1 vertebral body, ranging in size from 0.7-3.0 cm in size.  Ischial and right femur were also involved, as well as a large iliac mass measuring about 15 cm in the uterus. 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   Radiation Oncology consultation was performed 03/11/2014, and she was given radiation in 1 dose of 6 Gy to the right iliac lesion.        With the initial diagnosis, she initiated treatment with 6 cycles of CAF neoadjuvant chemotherapy 02/14, 07/07, 03/28, 04/18/14, 05/08 and 05/29/14. She also received monthly zoledronic acid.  She then underwent a modified left mastectomy of Palacios type and left lymphadenopathy on 06/27/2014.   Pathologic examination showed number at [facility] was [id number] showed infiltrative grade 2 ductal cancer with 6 level 1 metastatic lymph nodes and 3 level 2 metastatic lymph  nodes.  The differentiation was intermediate.  The tumor was ER positive 70% of the cells, MA positive in 40% of the cells and HER2 was 3+ by immunohistochemistry and the Ki-67 labeling index was 20%. Her staging after surgery was stage IV, pT4b N2 M1.  I don't see a biopsy of the skeletal metastases.  She then had continuation with chemotherapy with 4 cycles of Herceptin and taxane with montly zoledronic acid.  Tamoxifen was initiated.  She then had two years of Herceptin and a decision was made not to continue further Herceptin.  She continued on zoledronic acid every 3 months. She was clinically stable. [place] from [facility].  She arrived last month, established [place] residency and now seeks further oncology care.       Overall, Hoda has been doing reasonably well.  She has medical assistance from the [facility].       REVIEW OF SYSTEMS:  PS 1.  She does nap some but is able to do all of her household chores.  She denies fevers or chills, cough, chest pain, shortness breath, nausea, vomiting, constipation or diarrhea.  No hemoptysis, no numbness or tingling, hearing loss or depression.  She does have some low back pain.  She has no vision or coordination problems.  No difficulty with walking.  No neurologic symptoms.  The remainder of a 12-point review of systems is negative.       PAST MEDICAL HISTORY:  No history of heart disease, no breathing problems, blood clots, seizures, arthritis, peptic ulcer disease, osteoporosis.  History of left wrist fracture.       FAMILY HISTORY:  Positive for breast cancer in her mother diagnosed at age 45 and then again at age 70.  She had a sister with breast cancer who was diagnosed in her 60s.  Her sister lives in [place] and was tested for BRCA mutation and was BRCA mutation negative by report.  She has a daughter who is in good health and a daughter who passed away.  There is no family history of male breast cancer.  No family  history of ovarian cancer.       PAST MENSTRUAL HISTORY:  First period at age 16, first pregnancy at age 22, second pregnancy at age 29, last period was at age 46.  Uterus and ovaries are in place.  No history of hormone replacement therapy. Notably, the patient's uterus and ovaries remain in place.       HABITS:  No history of tobacco or alcohol.       SOCIAL:  She worked as an  in [employer] and is a refugee.  She is of [place] descent and has not had BRCA testing.       TREATMENT SUMMARY:  A.  Diagnosis with bone only metasatases and single dose of 6 Gy to right iliac mass.  B.  Preoperative AC for 6 cycles.   C.  Left mastatectomy and lymph node dissection.   D.  Taxol and Herceptin for 4 cycles.   E.  Herceptin for completed 2 years, ending mid-2016.   F.  Tamoxifen beginning in late 2015.  G.  Zoledronic acid monthly in 2014, and then every 3 months.       Last Zometa was 06/06/2017 and last Herceptin was approximately a year ago.          Combined Report of:PET and CT on  8/21/2017 2:30 PM:      1. PET of the neck, chest, abdomen, and pelvis.  2. PET CT Fusion for Attenuation Correction and Anatomical  Localization:    3. Diagnostic CT scan of the chest, abdomen, and pelvis with  intravenous contrast for interpretation.  3. CT of the chest, abdomen and pelvis obtained for diagnostic  interpretation.  4. 3D MIP and PET-CT fused images were processed on an independent  workstation and archived to PACS and reviewed by a radiologist.      Technique:       1. PET: The patient received 10.98 mCi of F-18-FDG; the serum glucose  was 97 prior to administration, body weight was 79.5 kg. Images were  evaluated in the axial, sagittal, and coronal planes as well as the  rotational whole body MIP. Images were acquired from the Vertex to the  Feet.      UPTAKE WAS MEASURED AT 61 MINUTES.       2. CT: Volumetric acquisition for clinical interpretation of the  chest, abdomen, and pelvis  acquired at 3 mm sections . The chest,  abdomen, and pelvis were evaluated at 5 mm sections in bone, soft  tissue, and lung windows.        The patient received 107 cc. Of Isovue 370 intravenously for the  examination.    Outside PET/CT on 7/2/2015 and 8/12/2014      3. 3D MIP and PET-CT fused images were processed on an independent  workstation and archived to PACS and reviewed by a radiologist.      INDICATION: Malignant neoplasm of unspecified site of left female  breast      ADDITIONAL INFORMATION OBTAINED FROM EMR: none      COMPARISON: None.      FINDINGS:       HEAD/NECK:  There is no  suspicious FDG uptake in the neck.       Scattered mucous retention cyst in the bilateral maxillary sinuses,  right greater than left The mastoid air cells are clear.       The mucosal pharyngeal space, the , prevertebral and carotid  spaces are within normal limits.       No masses, mass effect or pathologically enlarged lymph nodes are  evident. The thyroid gland is unremarkable.      CHEST:  Multiple osseous foci of increased radiotracer uptake, as detailed  below.      Postsurgical changes of left mastectomy.      There are no pathologically enlarged mediastinal, hilar or axillary  lymph nodes.  6 mm left lower lobe pulmonary nodule (series 6 image 108), not  significantly changed as compared to 6/12/2014 exam. There are no  suspicious lung nodules or evidence for infection.          There is no significant pericardial or plural effusions.      ABDOMEN AND PELVIS:  There is no suspicious FDG uptake in the abdomen or pelvis.      Myomatous uterus.      There are no suspicious hepatic lesions. There is no splenomegaly or  evidence for splenic or pancreatic mass lesion.  There are no suspicious adrenal mass lesions or opaque gallbladder  calculi.  There is symmetric nephrographic renal phase without  hydronephrosis.       There is no evidence for diverticulitis, bowel obstruction or free  fluid.        LOWER  EXTREMITIES:   No abnormal masses or hypermetabolic lesions.      BONES:   Multiple osseous foci of increased radiotracer uptake, for example;  *  T7 vertebral body with SUV max of 4.1, corresponding sclerotic  lesion seen on CT images.  *  Medial aspect of the left sixth rib, with SUV max of 4.0,  corresponding to mixed lytic and sclerotic lesion seen on CT images  *  Expansile mixed lytic and sclerotic lesion involving the right  iliac bone with SUV max of 5.6.      These lesions appear not significantly changed as compared to prior  outside PET dating back to 6/12/2014 exam, although SUV measurement  are different given the difference in technique and sensitivity.  However when comparing the increased uptake to the background uptake,  lesion uptake appear similar to today's exam.          IMPRESSION: In this patient with history of left breast cancer status  post mastectomy  1. Multiple osseous foci of increased radio tracer uptake, consistent  with metastatic disease. These appear unchanged as compared to  6/12/2014 exam, although SUV measurement are different given the  difference in technique and sensitivity of today's exam and outside  PET/CT.  2. Stable non-FDG avid 6 x 4 mm left lower lobe pulmonary nodule, not  significantly changed as compared to 6/12/2014 exam.  3. Myomatous uterus.      I have personally reviewed the examination and initial interpretation  and I agree with the findings.      KRISTIE KEENE MD      I think that trastuzumab alone is reasonable because she does not have evidence of disease progression over the past two years, in terms of new sites of metastases, as evidenced by PET CT.       FOLLOWUP NOTE      INTERVAL HISTORY:  Hoda returns to the clinic, and has submitted a pathology report from her pathology from [facility].  The pathology report reveals that her breast cancer was ER-positive in 70% of cells, OR-positive in 40% of the cells, and was HER2/kenia 3+ positive with  a Ki-67 of 20%.  This official report confirms the HER2-positivity and justifies Herceptin every 3 weeks for her metastatic ER-positive/HER2-positive breast cancer.  She does have FDG uptake in her bone lesions, which ranges between 4 and 5.6, and I do think that this indicates active disease, even though there has been a 2-year lapse of Herceptin with her discontinuation in [facility], and then moved to the [place].  I think it would be very reasonable to continue the Herceptin indefinitely.      Overall, Hoda has been doing quite well.  She has no pain.  She has mild fatigue.  No depression, no anxiety.      REVIEW OF SYSTEMS:  A 10-point review of systems is entirely negative.      PHYSICAL EXAMINATION:   VITAL SIGNS:  Blood pressure 144/82, temperature 99.2, pulse 87, respirations 16, O2 sat 97% on room air and weight 77.8 kg.   GENERAL:  Hoda appeared generally well.  She has no alopecia.   HEENT:  Oropharynx is without lesions.   LYMPH NODE:  No palpable cervical, supraclavicular, subclavicular or axillary lymphadenopathy.   BREASTS:  Exam was not performed today.   LUNGS:  Clear to percussion and auscultation.   HEART:  There is a regular rate and rhythm, S1, S2.   ABDOMEN:  Soft and nontender, without hepatosplenomegaly.   EXTREMITIES:  Without edema.   PSYCHIATRIC:  Mood and affect were normal.      LABORATORY DATA:  None today.      Echocardiogram from 08/29/2017 showed an ejection fraction of 60%.      ASSESSMENT AND PLAN:     1.  Hoda Brush is a 62-year-old woman who as a refugee from [facility], formerly from [facility], who came [place] the [place]  She lives with her daughter, and is here to resume care for ER-positive, VT-positive and HER2-positive metastatic breast cancer with bone-only metastases by report.  She presented with and was diagnosed with metastatic breast cancer in approximately 02/2014, when she was diagnosed with a T4 N2 M1 invasive ductal carcinoma of the left breast.  We now  have a pathology report from [facility], which shows ER-positive in 70% of cells, [place]-positive in 40% of the cells, and HER2/kenia was 3+ positive.  Ki-67 was 20%.  She did undergo an iliac biopsy, which showed no viable tumor cells.  With this new report sent by her daughter, we have justification for continuing Herceptin indefinitely.  I discussed with Hoda about whether she wanted to do this, given that her PET/CT has been relatively stable for the last 2 years.  Her feeling was that she would like to resume the Herceptin.  I am in agreement with this plan, because she does have evidence for active disease in her bones based on the FDG uptake.  We will have a port placed.  We will begin trastuzumab on 09/19, and we will continue with Herceptin every 3 weeks; the loading dose will be 8 mg/kg, and then subsequent doses will be 6 mg/kg.  We will check routine labs, and also check the ejection fraction at a 3-month interval.  Her ejection fraction was normal at 60%.   2.  Restaging.  PET/CT scan of chest, abdomen and pelvis will be performed at a 3-month interval.  She did have a brain MRI, which showed no evidence of metastases, and we will not need to repeat this, unless there are symptoms.   3.  Lymphedema therapy has been requested.   4.  Genetic testing will be performed.  She has 2 first-degree relatives with breast cancer and is of [employer] descent.  She will be seeing Dr. Lianne Saavedra for genetics consultation.   5.  We will continue Xgeva every month.   6.  Follow up.  Return to clinic on 09/19 for the first Herceptin treatment. Port placement on right single lumen powerport, this week.  Begin Herceptin September 19 with Xgeva.  Follow up with KARISSA October 10 and with me October 31.  CBC, CMP, CA27.29 and CEA with follow up visits.      Thank you for allowing us to continue to participate in Hoda Herreramilagropatricio's care.      Lisandro Aguiar MD          [facility]         I spent 45 minutes with the patient more than 50% of which was in counseling and coordination of care.

## 2017-09-14 ENCOUNTER — ONCOLOGY VISIT (OUTPATIENT)
Dept: ONCOLOGY | Facility: CLINIC | Age: 62
End: 2017-09-14
Attending: INTERNAL MEDICINE
Payer: COMMERCIAL

## 2017-09-14 ENCOUNTER — ANESTHESIA EVENT (OUTPATIENT)
Dept: SURGERY | Facility: AMBULATORY SURGERY CENTER | Age: 62
End: 2017-09-14

## 2017-09-14 VITALS
HEART RATE: 87 BPM | BODY MASS INDEX: 30.38 KG/M2 | SYSTOLIC BLOOD PRESSURE: 144 MMHG | WEIGHT: 171.5 LBS | DIASTOLIC BLOOD PRESSURE: 82 MMHG | TEMPERATURE: 99.2 F | OXYGEN SATURATION: 97 % | RESPIRATION RATE: 16 BRPM

## 2017-09-14 DIAGNOSIS — C50.112 CANCER OF CENTRAL PORTION OF LEFT BREAST (H): Primary | ICD-10-CM

## 2017-09-14 DIAGNOSIS — C79.51 BONE METASTASIS: ICD-10-CM

## 2017-09-14 PROCEDURE — 99212 OFFICE O/P EST SF 10 MIN: CPT | Mod: ZF

## 2017-09-14 PROCEDURE — 99215 OFFICE O/P EST HI 40 MIN: CPT | Mod: ZP | Performed by: INTERNAL MEDICINE

## 2017-09-14 RX ORDER — TAMOXIFEN CITRATE 20 MG/1
20 TABLET ORAL DAILY
Qty: 90 TABLET | Refills: 3 | Status: SHIPPED | OUTPATIENT
Start: 2017-09-14 | End: 2017-10-31

## 2017-09-14 ASSESSMENT — PAIN SCALES - GENERAL: PAINLEVEL: NO PAIN (0)

## 2017-09-14 NOTE — LETTER
9/14/2017       RE: Hoda Brush  [address] [facility] [address]     Dear Colleague,    Thank you for referring your patient, Hoda Brush, to the [facility]. Please see a copy of my visit note below.    HISTORY OF PRESENT ILLNESS:        Hoda is seen with her daughter, Ashleigh, today for a new patient visit.  The patient is a refugee from [facility] and is here for continuation of care for her metastatic ER+HER2- breast cancer.  She is a Evangelical refugee from [facility] and was seen in clinic with her daughter.  The only data we have from [facility] is an English translation of her records.       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  On 02/11/2014, she was seen by an oncologist.  The clinical staging of T4b N2 M1 was noted.   Her breast cancer was on the left side. There was no right breast cancer, confirmed by the patient and her daughter, correcting a possible error in the translated records.  ER was positive in 100% of the cells, MN at 14% and HER2 was 3+ positive.  It appears that this histopathologic information is on the mastectomy specimen. She underwent an MRI for staging of presumptive bone metastases which was performed 03/02/2014.  There were skeletal metastases in the thoracic vertebrae at 12, L1, L3, L5 and S1 vertebral body, ranging in size from 0.7-3.0 cm in size.  Ischial and right femur were also involved, as well as a large iliac mass measuring about 15 cm in the uterus. 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   Radiation Oncology consultation was performed 03/11/2014, and she was given radiation in 1 dose of 6 Gy to the right iliac lesion.        With the initial diagnosis, she initiated treatment with 6 cycles of CAF neoadjuvant chemotherapy 02/14, 07/07, 03/28, 04/18/14, 05/08 and 05/29/14. She also received monthly zoledronic acid.  She then underwent a modified left  mastectomy of Palacios type and left lymphadenopathy on 06/27/2014.   Pathologic examination showed number at [facility] was [id number] showed infiltrative grade 2 ductal cancer with 6 level 1 metastatic lymph nodes and 3 level 2 metastatic lymph nodes.  The differentiation was intermediate.  The tumor was ER positive 70% of the cells, OR positive in 40% of the cells and HER2 was 3+ by immunohistochemistry and the Ki-67 labeling index was 20%. Her staging after surgery was stage IV, pT4b N2 M1.  I don't see a biopsy of the skeletal metastases.  She then had continuation with chemotherapy with 4 cycles of Herceptin and taxane with montly zoledronic acid.  Tamoxifen was initiated.  She then had two years of Herceptin and a decision was made not to continue further Herceptin.  She continued on zoledronic acid every 3 months. She was clinically stable. [place] then [place] the [place] new refugee from [facility].  She arrived last month, established [place] residency and now seeks further oncology care.       Overall, Hoda has been doing reasonably well.  She has medical assistance from the [facility].       REVIEW OF SYSTEMS:  PS 1.  She does nap some but is able to do all of her household chores.  She denies fevers or chills, cough, chest pain, shortness breath, nausea, vomiting, constipation or diarrhea.  No hemoptysis, no numbness or tingling, hearing loss or depression.  She does have some low back pain.  She has no vision or coordination problems.  No difficulty with walking.  No neurologic symptoms.  The remainder of a 12-point review of systems is negative.       PAST MEDICAL HISTORY:  No history of heart disease, no breathing problems, blood clots, seizures, arthritis, peptic ulcer disease, osteoporosis.  History of left wrist fracture.       FAMILY HISTORY:  Positive for breast cancer in her mother diagnosed at age 45 and then again at age 70.  She had a sister with breast cancer who was diagnosed in  her 60s.  Her sister lives in [facility] and was tested for BRCA mutation and was BRCA mutation negative by report.  She has a daughter who is in good health and a daughter who passed away.  There is no family history of male breast cancer.  No family history of ovarian cancer.       PAST MENSTRUAL HISTORY:  First period at age 16, first pregnancy at age 22, second pregnancy at age 29, last period was at age 46.  Uterus and ovaries are in place.  No history of hormone replacement therapy. Notably, the patient's uterus and ovaries remain in place.       HABITS:  No history of tobacco or alcohol.       SOCIAL:  She worked as an  in [employer] and is a refugee.  She is of [place] descent and has not had BRCA testing.       TREATMENT SUMMARY:  A.  Diagnosis with bone only metasatases and single dose of 6 Gy to right iliac mass.  B.  Preoperative AC for 6 cycles.   C.  Left mastatectomy and lymph node dissection.   D.  Taxol and Herceptin for 4 cycles.   E.  Herceptin for completed 2 years, ending mid-2016.   F.  Tamoxifen beginning in late 2015.  G.  Zoledronic acid monthly in 2014, and then every 3 months.       Last Zometa was 06/06/2017 and last Herceptin was approximately a year ago.          Combined Report of:PET and CT on  8/21/2017 2:30 PM:      1. PET of the neck, chest, abdomen, and pelvis.  2. PET CT Fusion for Attenuation Correction and Anatomical  Localization:    3. Diagnostic CT scan of the chest, abdomen, and pelvis with  intravenous contrast for interpretation.  3. CT of the chest, abdomen and pelvis obtained for diagnostic  interpretation.  4. 3D MIP and PET-CT fused images were processed on an independent  workstation and archived to PACS and reviewed by a radiologist.      Technique:       1. PET: The patient received 10.98 mCi of F-18-FDG; the serum glucose  was 97 prior to administration, body weight was 79.5 kg. Images were  evaluated in the axial, sagittal, and  coronal planes as well as the  rotational whole body MIP. Images were acquired from the Vertex to the  Feet.      UPTAKE WAS MEASURED AT 61 MINUTES.       2. CT: Volumetric acquisition for clinical interpretation of the  chest, abdomen, and pelvis acquired at 3 mm sections . The chest,  abdomen, and pelvis were evaluated at 5 mm sections in bone, soft  tissue, and lung windows.        The patient received 107 cc. Of Isovue 370 intravenously for the  examination.    Outside PET/CT on 7/2/2015 and 8/12/2014      3. 3D MIP and PET-CT fused images were processed on an independent  workstation and archived to PACS and reviewed by a radiologist.      INDICATION: Malignant neoplasm of unspecified site of left female  breast      ADDITIONAL INFORMATION OBTAINED FROM EMR: none      COMPARISON: None.      FINDINGS:       HEAD/NECK:  There is no  suspicious FDG uptake in the neck.       Scattered mucous retention cyst in the bilateral maxillary sinuses,  right greater than left The mastoid air cells are clear.       The mucosal pharyngeal space, the , prevertebral and carotid  spaces are within normal limits.       No masses, mass effect or pathologically enlarged lymph nodes are  evident. The thyroid gland is unremarkable.      CHEST:  Multiple osseous foci of increased radiotracer uptake, as detailed  below.      Postsurgical changes of left mastectomy.      There are no pathologically enlarged mediastinal, hilar or axillary  lymph nodes.  6 mm left lower lobe pulmonary nodule (series 6 image 108), not  significantly changed as compared to 6/12/2014 exam. There are no  suspicious lung nodules or evidence for infection.          There is no significant pericardial or plural effusions.      ABDOMEN AND PELVIS:  There is no suspicious FDG uptake in the abdomen or pelvis.      Myomatous uterus.      There are no suspicious hepatic lesions. There is no splenomegaly or  evidence for splenic or pancreatic mass  lesion.  There are no suspicious adrenal mass lesions or opaque gallbladder  calculi.  There is symmetric nephrographic renal phase without  hydronephrosis.       There is no evidence for diverticulitis, bowel obstruction or free  fluid.        LOWER EXTREMITIES:   No abnormal masses or hypermetabolic lesions.      BONES:   Multiple osseous foci of increased radiotracer uptake, for example;  *  T7 vertebral body with SUV max of 4.1, corresponding sclerotic  lesion seen on CT images.  *  Medial aspect of the left sixth rib, with SUV max of 4.0,  corresponding to mixed lytic and sclerotic lesion seen on CT images  *  Expansile mixed lytic and sclerotic lesion involving the right  iliac bone with SUV max of 5.6.      These lesions appear not significantly changed as compared to prior  outside PET dating back to 6/12/2014 exam, although SUV measurement  are different given the difference in technique and sensitivity.  However when comparing the increased uptake to the background uptake,  lesion uptake appear similar to today's exam.          IMPRESSION: In this patient with history of left breast cancer status  post mastectomy  1. Multiple osseous foci of increased radio tracer uptake, consistent  with metastatic disease. These appear unchanged as compared to  6/12/2014 exam, although SUV measurement are different given the  difference in technique and sensitivity of today's exam and outside  PET/CT.  2. Stable non-FDG avid 6 x 4 mm left lower lobe pulmonary nodule, not  significantly changed as compared to 6/12/2014 exam.  3. Myomatous uterus.      I have personally reviewed the examination and initial interpretation  and I agree with the findings.      KRISTIE KEENE MD      I think that trastuzumab alone is reasonable because she does not have evidence of disease progression over the past two years, in terms of new sites of metastases, as evidenced by PET CT.       FOLLOWUP NOTE      INTERVAL HISTORY:  Hoda  returns to the clinic, and has submitted a pathology report from her pathology from [facility].  The pathology report reveals that her breast cancer was ER-positive in 70% of cells, UT-positive in 40% of the cells, and was HER2/kenia 3+ positive with a Ki-67 of 20%.  This official report confirms the HER2-positivity and justifies Herceptin every 3 weeks for her metastatic ER-positive/HER2-positive breast cancer.  She does have FDG uptake in her bone lesions, which ranges between 4 and 5.6, and I do think that this indicates active disease, even though there has been a 2-year lapse of Herceptin with her discontinuation in [facility], and then moved to the [place].  I think it would be very reasonable to continue the Herceptin indefinitely.      Overall, Hoda has been doing quite well.  She has no pain.  She has mild fatigue.  No depression, no anxiety.      REVIEW OF SYSTEMS:  A 10-point review of systems is entirely negative.      PHYSICAL EXAMINATION:   VITAL SIGNS:  Blood pressure 144/82, temperature 99.2, pulse 87, respirations 16, O2 sat 97% on room air and weight 77.8 kg.   GENERAL:  Hoda appeared generally well.  She has no alopecia.   HEENT:  Oropharynx is without lesions.   LYMPH NODE:  No palpable cervical, supraclavicular, subclavicular or axillary lymphadenopathy.   BREASTS:  Exam was not performed today.   LUNGS:  Clear to percussion and auscultation.   HEART:  There is a regular rate and rhythm, S1, S2.   ABDOMEN:  Soft and nontender, without hepatosplenomegaly.   EXTREMITIES:  Without edema.   PSYCHIATRIC:  Mood and affect were normal.      LABORATORY DATA:  None today.      Echocardiogram from 08/29/2017 showed an ejection fraction of 60%.      ASSESSMENT AND PLAN:     1.  Hoda Brush is a 62-year-old woman who as a refugee from [facility], formerly from [facility], who came [place]  She lives with her daughter, and is here to resume care for ER-positive, UT-positive and  HER2-positive metastatic breast cancer with bone-only metastases by report.  She presented with and was diagnosed with metastatic breast cancer in approximately 02/2014, when she was diagnosed with a T4 N2 M1 invasive ductal carcinoma of the left breast.  We now have a pathology report from [facility], which shows ER-positive in 70% of cells, NC-positive in 40% of the cells, and HER2/kenia was 3+ positive.  Ki-67 was 20%.  She did undergo an iliac biopsy, which showed no viable tumor cells.  With this new report sent by her daughter, we have justification for continuing Herceptin indefinitely.  I discussed with Hoda about whether she wanted to do this, given that her PET/CT has been relatively stable for the last 2 years.  Her feeling was that she would like to resume the Herceptin.  I am in agreement with this plan, because she does have evidence for active disease in her bones based on the FDG uptake.  We will have a port placed.  We will begin trastuzumab on 09/19, and we will continue with Herceptin every 3 weeks; the loading dose will be 8 mg/kg, and then subsequent doses will be 6 mg/kg.  We will check routine labs, and also check the ejection fraction at a 3-month interval.  Her ejection fraction was normal at 60%.   2.  Restaging.  PET/CT scan of chest, abdomen and pelvis will be performed at a 3-month interval.  She did have a brain MRI, which showed no evidence of metastases, and we will not need to repeat this, unless there are symptoms.   3.  Lymphedema therapy has been requested.   4.  Genetic testing will be performed.  She has 2 first-degree relatives with breast cancer and is of [place]atter [place] descent.  She will be seeing Dr. Lianne Saavedra for genetics consultation.   5.  We will continue Xgeva every month.   6.  Follow up.  Return to clinic on 09/19 for the first Herceptin treatment. Port placement on right single lumen powerport, this week.  Begin Herceptin September 19 with Xgeva.  Follow up with KARISSA October  10 and with me October 31.  CBC, CMP, CA27.29 and CEA with follow up visits.      Thank you for allowing us to continue to participate in Hoda Brush's care.      Lisandro Aguiar MD          [facility]        I spent 45 minutes with the patient more than 50% of which was in counseling and coordination of care.     Again, thank you for allowing me to participate in the care of your patient.      Sincerely,    Lisandro Aguiar MD

## 2017-09-14 NOTE — MR AVS SNAPSHOT
After Visit Summary   9/14/2017    Hoda Brush    MRN: [id number]           Patient Information     Date Of Birth          1955        Visit Information        Provider Department      9/14/2017 9:15 AM Lisandro Aguiar MD; [place] [employer] [facility]        Today's Diagnoses     Cancer of central portion of left breast (H)    -  1       Follow-ups after your visit        Your next 10 appointments already scheduled     Sep 19, 2017  9:30 AM CDT   Infusion 180 with UC ONCOLOGY INFUSION, UC 22 ATC   [facility] ([place] [facility])    [address] [place] [address]   [phone]            Sep 19, 2017  2:15 PM CDT   (Arrive by 2:00 PM)   NEW WITH ROOM with Lianne Saavedra GC,  2 118 CONSULT RM   [facility] ([place] [facility])    [address] [place] [address]   [phone]            Sep 19, 2017  3:45 PM CDT   Masonic Lab Draw with  MASONIC LAB DRAW   [facility] [facility]    [address] [place] [address]   [phone]            Oct 10, 2017 12:30 PM CDT   Masonic Lab Draw with  MASONIC LAB DRAW   [facility] Lab Draw ([place] [facility])    [address] [place] [address]   [phone]            Oct 10, 2017  1:00 PM CDT   (Arrive by 12:45 PM)   Return Visit with SEMAJ Walls   [facility] [facility]    [address] [place] [address]   [phone]            Oct 31, 2017  1:30 PM CDT   Masonic Lab Draw with  MASONIC LAB DRAW   [facility] Lab Draw ([place] [facility])    [address] [place] [address]   [phone]            Oct 31, 2017  2:00 PM CDT    (Arrive by 1:45 PM)   Return Visit with Lisandro Aguiar MD   [facility]    [address]  2nd Floor  [facility] [address] [phone]              Future tests that were ordered for you today     Open Future Orders        Priority Expected Expires Ordered    IR Chest Port Placement > 5 Yrs of Age Routine  9/14/2018 9/14/2017            Who to contact     If you have questions or need follow up information about today's clinic visit or your schedule please contact [facility] directly at [phone].  Normal or non-critical lab and imaging results will be communicated to you by toucanBoxhart, letter or phone within 4 business days after the clinic has received the results. If you do not hear from us within 7 days, please contact the clinic through toucanBoxhart or phone. If you have a critical or abnormal lab result, we will notify you by phone as soon as possible.  Submit refill requests through [employer] or call your pharmacy and they will forward the refill request to us. Please allow 3 business days for your refill to be completed.          Additional Information About Your Visit        MyChart Information     [employer] gives you secure access to your electronic health record. If you see a primary care provider, you can also send messages to your care team and make appointments. If you have questions, please call your primary care clinic.  If you do not have a primary care provider, please call [phone] and they will assist you.        Care EveryWhere ID     This is your Care EveryWhere ID. This could be used by other organizations to access your [place] medical records  [id number]        Your Vitals Were     Pulse Temperature Respirations Pulse Oximetry BMI (Body Mass Index)       87 99.2  F (37.3  C) (Oral) 16 97% 30.38 kg/m2        Blood Pressure from Last 3 Encounters:   09/14/17 144/82   08/31/17 110/72   08/28/17  132/79    Weight from Last 3 Encounters:   09/14/17 77.8 kg (171 lb 8 oz)   08/28/17 79.5 kg (175 lb 3.2 oz)   08/15/17 79.5 kg (175 lb 4.8 oz)                 Today's Medication Changes          These changes are accurate as of: 9/14/17 11:19 AM.  If you have any questions, ask your nurse or doctor.               These medicines have changed or have updated prescriptions.        Dose/Directions    * TAMOXIFEN CITRATE PO   This may have changed:  Another medication with the same name was changed. Make sure you understand how and when to take each.        Refills:  0       * tamoxifen 20 MG tablet   Commonly known as:  NOLVADEX   This may have changed:  when to take this   Used for:  Cancer of central portion of left breast (H)   Changed by:  Lisandro Aguiar MD        Dose:  20 mg   Take 1 tablet (20 mg) by mouth daily   Quantity:  90 tablet   Refills:  3       * Notice:  This list has 2 medication(s) that are the same as other medications prescribed for you. Read the directions carefully, and ask your doctor or other care provider to review them with you.         Where to get your medicines      These medications were sent to [facility] [id number] [facility] - [place], [place] - [address]  [address], [place] [address]     Phone:  [phone]     tamoxifen 20 MG tablet                Primary Care Provider    Physician No Ref-Primary       No address on file        Equal Access to Services     NOE VAZQUEZ AH: Hadii kamran portilloo Sosusanna, waaxda luqadaha, qaybta kaalmada adeegyada, alexa beltran. So [facility] [phone].    ATENCIÓN: Si habla español, tiene a lagunas disposición servicios gratuitos de asistencia lingüística. Llame al [phone].    We comply with applicable federal civil rights laws and [place] laws. We do not discriminate on the basis of race, color, national origin, age, disability sex, sexual orientation or gender identity.            Thank you!     Thank you  for Counts include 234 beds at the [facility]  for your care. Our goal is always to provide you with excellent care. Hearing back from our patients is one way we can continue to improve our services. Please take a few minutes to complete the written survey that you may receive in the mail after your visit with us. Thank you!             Your Updated Medication List - Protect others around you: Learn how to safely use, store and throw away your medicines at www.disposemymeds.org.          This list is accurate as of: 9/14/17 11:19 AM.  Always use your most recent med list.                   Brand Name Dispense Instructions for use Diagnosis    order for DME     1 each    L UE class 2 compression sleeve and gauntlet Night garment Bandaging supplies    Lymphedema of left upper extremity       * TAMOXIFEN CITRATE PO           * tamoxifen 20 MG tablet    NOLVADEX    90 tablet    Take 1 tablet (20 mg) by mouth daily    Cancer of central portion of left breast (H)       Vitamin D3 76278 UNITS Tabs           * Notice:  This list has 2 medication(s) that are the same as other medications prescribed for you. Read the directions carefully, and ask your doctor or other care provider to review them with you.

## 2017-09-14 NOTE — NURSING NOTE
"Oncology Rooming Note    September 14, 2017 10:12 AM   Hoda Brush is a 62 year old female who presents for:    Chief Complaint   Patient presents with     Oncology Clinic Visit     Patient is here for treatment plan     Initial Vitals: /82 (BP Location: Right arm, Patient Position: Sitting, Cuff Size: Adult Regular)  Pulse 87  Temp 99.2  F (37.3  C) (Oral)  Resp 16  SpO2 97% Estimated body mass index is 31.04 kg/(m^2) as calculated from the following:    Height as of 8/15/17: 1.6 m (5' 3\").    Weight as of 8/28/17: 79.5 kg (175 lb 3.2 oz). There is no height or weight on file to calculate BSA.  No Pain (0) Comment: Data Unavailable   No LMP recorded. Patient is postmenopausal.  Allergies reviewed: Yes  Medications reviewed: Yes    Medications: Medication refills not needed today.  Pharmacy name entered into [employer]: [id number] - [place], [place] - [address]    Clinical concerns: Patient is not in any pain at this moment.     6 minutes for nursing intake (face to face time)     Zoraida Melara LPN            "

## 2017-09-15 ENCOUNTER — ANESTHESIA (OUTPATIENT)
Dept: SURGERY | Facility: AMBULATORY SURGERY CENTER | Age: 62
End: 2017-09-15

## 2017-09-15 ENCOUNTER — HOSPITAL ENCOUNTER (OUTPATIENT)
Facility: AMBULATORY SURGERY CENTER | Age: 62
End: 2017-09-15
Attending: PHYSICIAN ASSISTANT

## 2017-09-15 ENCOUNTER — SURGERY (OUTPATIENT)
Age: 62
End: 2017-09-15

## 2017-09-15 VITALS
BODY MASS INDEX: 28.28 KG/M2 | RESPIRATION RATE: 14 BRPM | OXYGEN SATURATION: 98 % | TEMPERATURE: 97.6 F | SYSTOLIC BLOOD PRESSURE: 115 MMHG | WEIGHT: 169.75 LBS | HEIGHT: 65 IN | DIASTOLIC BLOOD PRESSURE: 75 MMHG

## 2017-09-15 DEVICE — CATH PORT POWERPORT CLEARVUE SLIM 6FR 5616000: Type: IMPLANTABLE DEVICE | Site: CHEST  WALL | Status: FUNCTIONAL

## 2017-09-15 RX ORDER — SODIUM CHLORIDE 9 MG/ML
INJECTION, SOLUTION INTRAVENOUS CONTINUOUS
Status: DISCONTINUED | OUTPATIENT
Start: 2017-09-15 | End: 2017-09-16 | Stop reason: HOSPADM

## 2017-09-15 RX ORDER — HEPARIN SODIUM (PORCINE) LOCK FLUSH IV SOLN 100 UNIT/ML 100 UNIT/ML
5 SOLUTION INTRAVENOUS
Status: DISCONTINUED | OUTPATIENT
Start: 2017-09-15 | End: 2017-09-16 | Stop reason: HOSPADM

## 2017-09-15 RX ORDER — SODIUM CHLORIDE, SODIUM LACTATE, POTASSIUM CHLORIDE, CALCIUM CHLORIDE 600; 310; 30; 20 MG/100ML; MG/100ML; MG/100ML; MG/100ML
INJECTION, SOLUTION INTRAVENOUS CONTINUOUS
Status: DISCONTINUED | OUTPATIENT
Start: 2017-09-15 | End: 2017-09-16 | Stop reason: HOSPADM

## 2017-09-15 RX ORDER — ONDANSETRON 4 MG/1
4 TABLET, ORALLY DISINTEGRATING ORAL EVERY 30 MIN PRN
Status: DISCONTINUED | OUTPATIENT
Start: 2017-09-15 | End: 2017-09-16 | Stop reason: HOSPADM

## 2017-09-15 RX ORDER — NALOXONE HYDROCHLORIDE 0.4 MG/ML
.1-.4 INJECTION, SOLUTION INTRAMUSCULAR; INTRAVENOUS; SUBCUTANEOUS
Status: DISCONTINUED | OUTPATIENT
Start: 2017-09-15 | End: 2017-09-16 | Stop reason: HOSPADM

## 2017-09-15 RX ORDER — PROPOFOL 10 MG/ML
INJECTION, EMULSION INTRAVENOUS PRN
Status: DISCONTINUED | OUTPATIENT
Start: 2017-09-15 | End: 2017-09-15

## 2017-09-15 RX ORDER — ONDANSETRON 2 MG/ML
INJECTION INTRAMUSCULAR; INTRAVENOUS PRN
Status: DISCONTINUED | OUTPATIENT
Start: 2017-09-15 | End: 2017-09-15

## 2017-09-15 RX ORDER — HEPARIN SODIUM,PORCINE 10 UNIT/ML
5 VIAL (ML) INTRAVENOUS EVERY 24 HOURS
Status: DISCONTINUED | OUTPATIENT
Start: 2017-09-15 | End: 2017-09-16 | Stop reason: HOSPADM

## 2017-09-15 RX ORDER — LIDOCAINE HYDROCHLORIDE 20 MG/ML
INJECTION, SOLUTION INFILTRATION; PERINEURAL PRN
Status: DISCONTINUED | OUTPATIENT
Start: 2017-09-15 | End: 2017-09-15

## 2017-09-15 RX ORDER — MEPERIDINE HYDROCHLORIDE 25 MG/ML
12.5 INJECTION INTRAMUSCULAR; INTRAVENOUS; SUBCUTANEOUS
Status: DISCONTINUED | OUTPATIENT
Start: 2017-09-15 | End: 2017-09-16 | Stop reason: HOSPADM

## 2017-09-15 RX ORDER — PROPOFOL 10 MG/ML
INJECTION, EMULSION INTRAVENOUS CONTINUOUS PRN
Status: DISCONTINUED | OUTPATIENT
Start: 2017-09-15 | End: 2017-09-15

## 2017-09-15 RX ORDER — LIDOCAINE 40 MG/G
CREAM TOPICAL
Status: DISCONTINUED | OUTPATIENT
Start: 2017-09-15 | End: 2017-09-16 | Stop reason: HOSPADM

## 2017-09-15 RX ORDER — ONDANSETRON 2 MG/ML
4 INJECTION INTRAMUSCULAR; INTRAVENOUS EVERY 30 MIN PRN
Status: DISCONTINUED | OUTPATIENT
Start: 2017-09-15 | End: 2017-09-16 | Stop reason: HOSPADM

## 2017-09-15 RX ADMIN — Medication 20 ML: at 12:25

## 2017-09-15 RX ADMIN — LIDOCAINE HYDROCHLORIDE 100 MG: 20 INJECTION, SOLUTION INFILTRATION; PERINEURAL at 11:45

## 2017-09-15 RX ADMIN — ONDANSETRON 4 MG: 2 INJECTION INTRAMUSCULAR; INTRAVENOUS at 11:45

## 2017-09-15 RX ADMIN — PROPOFOL 30 MG: 10 INJECTION, EMULSION INTRAVENOUS at 11:50

## 2017-09-15 RX ADMIN — SODIUM CHLORIDE, SODIUM LACTATE, POTASSIUM CHLORIDE, CALCIUM CHLORIDE: 600; 310; 30; 20 INJECTION, SOLUTION INTRAVENOUS at 11:39

## 2017-09-15 RX ADMIN — PROPOFOL 100 MCG/KG/MIN: 10 INJECTION, EMULSION INTRAVENOUS at 11:54

## 2017-09-15 NOTE — DISCHARGE INSTRUCTIONS
A collaboration between [facility] and [facility] [place]  Experts in minimally invasive, targeted treatments performed using imaging guidance    Venous Access Device,  Port Catheter or Tunneled Central Line Placement    Today you had a procedure today to install a venous access device; either a tunneled central vein catheter or a subcutaneous port catheter.    One of our Radiology PAs performed this procedure for you today:  ? Dmitriy Hernandez PA-C  ? DEBORAH Lopez PA-C  ? Riccardo Cespedes PA-C  ? Hyun Valdovinos PA-C   ? Jonathan Clark PA-C    After you go home:  - Drink plenty of fluids.  Generally 6-8 (8 ounce) glasses a day is recommended.  - Resume your regular diet unless otherwise ordered by a medical provider.  - Keep any applied tape/gauze dressings clean and dry.  Change tape/gauze dressings if they get wet or soiled.  - You may shower the following day after procedure, however cover and protect from moisture any tape/gauze dressings.  You may let water hit and run over dried skin glue, but do not scrub.  Pat the area dry after showering.  - Port placement incisions are closed with absorbable suture, meaning they do not need to be removed at a later date, and a topical skin adhesive (skin glue).  This glue will wear off in 7-14 days.  Do not remove before this time.  If 14 days have passed and residual glue is present, you may gently remove it.  - Do not apply gels, lotions, or ointments to the glue site for the first 10 days as this may cause the glue to prematurely soften and fail.  - Do not perform strenuous activities or lift greater than 10 pounds for the next three days.  - If there is bleeding or oozing from the procedure site, apply firm pressure to the area for 5-10 minutes.  If the bleeding continues seek medical advice at the numbers below.  - Mild procedure site discomfort can be treated with an ice pack and over-the-counter pain  relievers.        For 24 hours after any sedation used:  - Relax and take it easy.  No strenuous activities.  - Do not drive or operate machines at home or at work.  - No alcohol consumption.  - Do not make any important or legal decisions.    Call our Interventional Radiology (IR) service if:  - If you start bleeding from the procedure site.  If you do start to bleed from the site, lie down and hold some pressure on the site.  Our radiology provider can help you decide if you need to return to the hospital.  - If you have new or worsening pain related to the procedure.  - If you have concerning swelling at the procedure site.  - If you develop persistent nausea or vomiting.  - If you develop hives or a rash or any unexplained itching.  - If you have a fever of greater than 100.5  F and chills in the first 5 days after procedure.  - Any other concerns related to your procedure.      [facility]  Interventional Radiology (IR)  [address] [place] [address]    Contact Number:  [phone]  (IR control desk)  - Monday - Friday 8:00 am - 4:30 pm    After hours for urgent concerns:  [phone]  - After 4:30 pm Monday - Friday, Weekends and Holidays.   - Ask for Interventional Radiology on-call.  Someone is available 24 hours a day.  - [facility] toll free number:  [phone]

## 2017-09-15 NOTE — IP AVS SNAPSHOT
[facility]    [address] [place] [id number]    Phone:  [phone]    Fax:  [phone]                                       After Visit Summary   9/15/2017    Hoda Brush    MRN: [id number]           After Visit Summary Signature Page     I have received my discharge instructions, and my questions have been answered. I have discussed any challenges I see with this plan with the nurse or doctor.    ..........................................................................................................................................  Patient/Patient Representative Signature      ..........................................................................................................................................  Patient Representative Print Name and Relationship to Patient    ..................................................               ................................................  Date                                            Time    ..........................................................................................................................................  Reviewed by Signature/Title    ...................................................              ..............................................  Date                                                            Time

## 2017-09-15 NOTE — ANESTHESIA CARE TRANSFER NOTE
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    Anesthesia Type:   MAC     Note:  Airway :Room Air  Patient transferred to:Phase II  Comments: Arrive Phase II, Stable, Airway Intact  122/69, 74,16,97,97.1  All questions answered.      Vitals: (Last set prior to Anesthesia Care Transfer)    CRNA VITALS  9/15/2017 1209 - 9/15/2017 1242      9/15/2017             Resp Rate (set): 10                Electronically Signed By: KATHI Jalloh CRNA  September 15, 2017  12:42 PM

## 2017-09-15 NOTE — IP AVS SNAPSHOT
MRN:[id number]                      After Visit Summary   9/15/2017    Hoda Brush    MRN: [id number]           Thank you!     Thank you for choosing [facility] for your care. Our goal is always to provide you with excellent care. Hearing back from our patients is one way we can continue to improve our services. Please take a few minutes to complete the written survey that you may receive in the mail after you visit with us. Thank you!        Patient Information     Date Of Birth          1955        About your hospital stay     You were admitted on:  September 15, 2017 You last received care in the:  [facility]    You were discharged on:  September 15, 2017       Who to Call     For medical emergencies, please call 911.  For non-urgent questions about your medical care, please call your primary care provider or clinic, None  For questions related to your surgery, please call your surgery clinic        Attending Provider     Provider Specialty    Dmitriy Hernandez PA-C Radiology       Primary Care Provider    Physician No Ref-Primary      Your next 10 appointments already scheduled     Sep 19, 2017  9:30 AM CDT   Infusion 180 with  ONCOLOGY INFUSION, UC 22 ATC   [facility] ([facility])    [address] [place] [address]   [phone]            Sep 19, 2017  2:00 PM CDT   NEW WITH ROOM with Lianne Saavedra GC,  2 118 CONSULT RM   [facility] ([facility])    [address] [place] [address]   [phone]            Sep 19, 2017  3:45 PM CDT   [facility] Lab Draw with UC [facility]IC LAB DRAW   [facility] ([facility])    [address] [place] [address]   [phone]            Oct 10, 2017 12:30 PM CDT   [facility] Lab Draw with  [facility] LAB DRAW     [facility] ([facility])    [address] [address]   [phone]            Oct 10, 2017  1:00 PM CDT   (Arrive by 12:45 PM)   Return Visit with SEMAJ Walls   [facility] ([facility])    [address] [place] [address]   [phone]            Oct 31, 2017  1:30 PM CDT   [facility] Lab Draw with  [facility]IC LAB DRAW   [facility] Lab Draw ([facility])    [address] [place] [address]   [phone]            Oct 31, 2017  2:00 PM CDT   (Arrive by 1:45 PM)   Return Visit with Lisandro Aguiar MD   [facility] ([facility])    [address] [place] [id number]   [phone]              Further instructions from your care team         A collaboration between [facility] and [facility]  Experts in minimally invasive, targeted treatments performed using imaging guidance    Venous Access Device,  Port Catheter or Tunneled Central Line Placement    Today you had a procedure today to install a venous access device; either a tunneled central vein catheter or a subcutaneous port catheter.    One of our Radiology PAs performed this procedure for you today:  ? Dmitriy Hernandez PA-C  ? DEBORAH Lopez PA-C  ? Riccardo Cespedes PA-C  ? Hyun Valdovinos PA-C   ? Jonathan Clark PA-C    After you go home:  - Drink plenty of fluids.  Generally 6-8 (8 ounce) glasses a day is recommended.  - Resume your regular diet unless otherwise ordered by a medical provider.  - Keep any applied tape/gauze dressings clean and dry.  Change tape/gauze dressings if they get wet or soiled.  - You may shower the following day after procedure, however cover and protect from moisture any tape/gauze dressings.  You may  let water hit and run over dried skin glue, but do not scrub.  Pat the area dry after showering.  - Port placement incisions are closed with absorbable suture, meaning they do not need to be removed at a later date, and a topical skin adhesive (skin glue).  This glue will wear off in 7-14 days.  Do not remove before this time.  If 14 days have passed and residual glue is present, you may gently remove it.  - Do not apply gels, lotions, or ointments to the glue site for the first 10 days as this may cause the glue to prematurely soften and fail.  - Do not perform strenuous activities or lift greater than 10 pounds for the next three days.  - If there is bleeding or oozing from the procedure site, apply firm pressure to the area for 5-10 minutes.  If the bleeding continues seek medical advice at the numbers below.  - Mild procedure site discomfort can be treated with an ice pack and over-the-counter pain relievers.        For 24 hours after any sedation used:  - Relax and take it easy.  No strenuous activities.  - Do not drive or operate machines at home or at work.  - No alcohol consumption.  - Do not make any important or legal decisions.    Call our Interventional Radiology (IR) service if:  - If you start bleeding from the procedure site.  If you do start to bleed from the site, lie down and hold some pressure on the site.  Our radiology provider can help you decide if you need to return to the hospital.  - If you have new or worsening pain related to the procedure.  - If you have concerning swelling at the procedure site.  - If you develop persistent nausea or vomiting.  - If you develop hives or a rash or any unexplained itching.  - If you have a fever of greater than 100.5  F and chills in the first 5 days after procedure.  - Any other concerns related to your procedure.      [facility]  Interventional Radiology (IR)  [address] Waiting [place]  "[id number]    Contact Number:  [phone]  (IR control desk)  - Monday - Friday 8:00 am - 4:30 pm    After hours for urgent concerns:  [phone]  - After 4:30 pm Monday - Friday, Weekends and Holidays.   - Ask for Interventional Radiology on-call.  Someone is available 24 hours a day.  - [facility] toll free number:  [phone]        Pending Results     Date and Time Order Name Status Description    9/15/2017 1123 IR CHEST PORT PLACEMENT > 5 YRS OF AGE In process             Admission Information     Date & Time Provider Department Dept. Phone    9/15/2017 Dmitriy Hernandez PA-C [facility] [phone]      Your Vitals Were     Blood Pressure Temperature Respirations Height Weight Pulse Oximetry    122/69 97.1  F (36.2  C) (Temporal) 14 1.64 m (5' 4.57\") 77 kg (169 lb 12.1 oz) 96%    BMI (Body Mass Index)                   28.63 kg/m2           [facility] Information     [employer] gives you secure access to your electronic health record. If you see a primary care provider, you can also send messages to your care team and make appointments. If you have questions, please call your primary care clinic.  If you do not have a primary care provider, please call [phone] and they will assist you.      [employer] is an electronic gateway that provides easy, online access to your medical records. With [employer], you can request a clinic appointment, read your test results, renew a prescription or communicate with your care team.     To access your existing account, please contact your [facility] or call [phone] for assistance.        Care EveryWhere ID     This is your Care EveryWhere ID. This could be used by other organizations to access your [facility] medical records  [id number]        Equal Access to Services     NOE VAZQUEZ : Luz Marina Ramsey, ro jefferson, alexa lopez. So [facility] " [phone].    ATENCIÓN: Si susana wallis, tiene a lagunas disposición servicios gratuitos de asistencia lingüística. Luis joshi [phone].    We comply with applicable federal civil rights laws and [place] laws. We do not discriminate on the basis of race, color, national origin, age, disability sex, sexual orientation or gender identity.               Review of your medicines      UNREVIEWED medicines. Ask your doctor about these medicines        Dose / Directions    tamoxifen 20 MG tablet   Commonly known as:  NOLVADEX   Used for:  Cancer of central portion of left breast (H)        Dose:  20 mg   Take 1 tablet (20 mg) by mouth daily   Quantity:  90 tablet   Refills:  3       Vitamin D3 19131 UNITS Tabs        Refills:  0         CONTINUE these medicines which have NOT CHANGED        Dose / Directions    order for DME   Used for:  Lymphedema of left upper extremity        L UE class 2 compression sleeve and gauntlet Night garment Bandaging supplies   Quantity:  1 each   Refills:  1                Protect others around you: Learn how to safely use, store and throw away your medicines at www.disposemymeds.org.             Medication List: This is a list of all your medications and when to take them. Check marks below indicate your daily home schedule. Keep this list as a reference.      Medications           Morning Afternoon Evening Bedtime As Needed    order for DME   L UE class 2 compression sleeve and gauntlet Night garment Bandaging supplies                                tamoxifen 20 MG tablet   Commonly known as:  NOLVADEX   Take 1 tablet (20 mg) by mouth daily                                Vitamin D3 95051 UNITS Tabs

## 2017-09-15 NOTE — BRIEF OP NOTE
Interventional Radiology Brief Post Procedure Note    Procedure: Central venous chest port placement.    Proceduralist: Aaron Hernandez PA-C    Assistant: None    Time Out: Prior to the start of the procedure and with procedural staff participation, I verbally confirmed the patient s identity using two indicators, relevant allergies, that the procedure was appropriate and matched the consent or emergent situation, and that the correct equipment/implants were available. Immediately prior to starting the procedure I conducted the Time Out with the procedural staff and re-confirmed the patient s name, procedure, and site/side. (The Joint Commission universal protocol was followed.)  Yes        Sedation: Monitored Anesthesia Care (MAC) administered and documented by Anesthesia Care Provider    Findings: Image guided placement of right IJ, 6 Fr., 22 cm, single lumen central venous chest port. Port is ready for immediate use.    Estimated Blood Loss: Less than 10 ml    Fluoroscopy Time: 0.1 minute(s)    SPECIMENS: None    Complications: 1. None     Condition: Stable    Plan: Follow up per primary team. Return to IR for port removal when indicated.    Comments: See dictated procedure note for full details.    Dmitriy Hernandez PA-C

## 2017-09-15 NOTE — ANESTHESIA PREPROCEDURE EVALUATION
Physical Exam  Normal systems: pulmonary and dental    Airway   Mallampati: II  TM distance: >3 FB  Neck ROM: full    Dental     Cardiovascular   Rhythm and rate: regular      Pulmonary                     Anesthesia Plan      History & Physical Review  History and physical reviewed and following examination; no interval change.    ASA Status:  3 .    NPO Status:  > 8 hours    Plan for MAC with Intravenous induction. Maintenance will be Other.  Reason for MAC:  Deep or markedly invasive procedure (G8)  PONV prophylaxis:  Ondansetron (or other 5HT-3) and Dexamethasone or Solumedrol       Postoperative Care  Postoperative pain management:  Multi-modal analgesia.      Consents  Anesthetic plan, risks, benefits and alternatives discussed with:  Patient..        Anesthesia Pre-op Note    Hoda Brush is a 62 year old female with past medical as described below is scheduled for Procedure(s):  Single Lumen Chest Power Port, Right - Wound Class: I-Clean    Past Medical History:   Diagnosis Date     Breast cancer metastasized to bone (H)      Past Surgical History:   Procedure Laterality Date     APPENDECTOMY       MASTECTOMY Left 06/27/2014    and lymph node resection     Family History   Problem Relation Age of Onset     Breast Cancer Mother 45     Lung Cancer Father      smoker     Breast Cancer Sister 61     Social History     Social History     Marital status:      Spouse name: N/A     Number of children: N/A     Years of education: N/A     Occupational History     Not on file.     Social History Main Topics     Smoking status: Never Smoker     Smokeless tobacco: Never Used     Alcohol use No     Drug use: No     Sexual activity: Not on file     Other Topics Concern     Not on file     Social History Narrative     Current Outpatient Prescriptions   Medication Sig Dispense Refill     tamoxifen (NOLVADEX) 20 MG tablet Take 1 tablet (20 mg) by mouth daily 90 tablet 3     order for DME L UE class 2  compression sleeve and gauntlet  Night garment  Bandaging supplies 1 each 1     Cholecalciferol (VITAMIN D3) 57251 UNITS TABS        TAMOXIFEN CITRATE PO        Current Outpatient Prescriptions   Medication     tamoxifen (NOLVADEX) 20 MG tablet     order for DME     Cholecalciferol (VITAMIN D3) 40858 UNITS TABS     TAMOXIFEN CITRATE PO     Current Facility-Administered Medications   Medication     sodium chloride (PF) 0.9% PF flush 3 mL     sodium chloride (PF) 0.9% PF flush 3 mL     medication instruction     0.9% sodium chloride infusion     ceFAZolin (ANCEF) intermittent infusion 2 g in dextrose PREMIX     HOLD: heparin SQ 12 hours pre-procedure     HOLD: enoxaparin (LOVENOX) pre-procedure OUTPATIENT     HOLD: warfarin (COUMADIN) pre-procedure     HOLD: rivaroxaban (XARELTO) 24 hours pre-procedure     HOLD: fondaparinux (ARIXTRA) pre-procedure     HOLD: AM insulin or oral hypoglycemics [glipiZIDE (GLUCOTROL), glyBURIDE (DIABETA/MICRONASE/GLYNASE), glimepiride (AMARYL), gliclazide, metFORMIN (GLUCOPHAGE), any metFORMIN-containing medication (GLUCOVANCE/METAGLIP/XIGDUO XR), rosiglitazone (AVANDIA), pioglitazone (ACTOS),  sitagliptin (JANUVIA), saxagliptin (ONGLYZA) and linagliptin (TRAJENTA)] pre-procedure     HOLD: dipyridamole (PERSANTINE), aspirin/dipyridamole (AGGRENOX), cilostazol (PLETAL), clopidogrel (PLAVIX), ticlopidine (TICLID), ticagrelor (BRILINTA) or prasugrel (EFFIENT)     heparin 5,000 units in 0.9% sodium chloride 1000 mL     lidocaine 1 % 1 mL     lidocaine (LMX4) kit     sodium chloride (PF) 0.9% PF flush 3 mL     sodium chloride (PF) 0.9% PF flush 3 mL     lactated ringers infusion      No Known Allergies      Lab:     CBC RESULTS:   Recent Labs   Lab Test  08/28/17   1702   WBC  8.2   RBC  4.14   HGB  12.4   HCT  39.6   MCV  96   MCH  30.0   MCHC  31.3*   RDW  13.0   PLT  208     Recent Labs   Lab Test  08/28/17   1702  08/08/17   0824   NA  143  143   POTASSIUM  3.9  3.8   CHLORIDE  108  108    CO2  27  28   ANIONGAP  7  7   GLC  85  86   BUN  15  16   CR  0.75  0.85   TAYLER  8.6  8.6     The laboratory has evaluated your INR and PTT and the results are as listed below.    Lab Results   Component Value Date    INR 0.99 08/28/2017     No results found for: PTT    Comments:                                                 .

## 2017-09-15 NOTE — ANESTHESIA POSTPROCEDURE EVALUATION
Patient: Hoda Brush    Procedure(s):  Central venous chest port placement, right - Wound Class: I-Clean    Diagnosis:Cancer of Center Portion of Left Breast  Diagnosis Additional Information: No value filed.    Anesthesia Type:  MAC    Note:  Anesthesia Post Evaluation    Last vitals:  Vitals:    09/15/17 1240 09/15/17 1255 09/15/17 1310   BP: 122/69 127/76 115/75   Resp: 14 14 14   Temp: 36.2  C (97.1  F)  36.4  C (97.6  F)   SpO2: 96% 95% 98%         Electronically Signed By: Katie Ch MD  September 15, 2017  2:29 PM

## 2017-09-19 ENCOUNTER — INFUSION THERAPY VISIT (OUTPATIENT)
Dept: ONCOLOGY | Facility: CLINIC | Age: 62
End: 2017-09-19
Attending: INTERNAL MEDICINE
Payer: COMMERCIAL

## 2017-09-19 ENCOUNTER — OFFICE VISIT (OUTPATIENT)
Dept: ONCOLOGY | Facility: CLINIC | Age: 62
End: 2017-09-19
Attending: GENETIC COUNSELOR, MS
Payer: COMMERCIAL

## 2017-09-19 VITALS
SYSTOLIC BLOOD PRESSURE: 119 MMHG | WEIGHT: 171.2 LBS | RESPIRATION RATE: 18 BRPM | DIASTOLIC BLOOD PRESSURE: 63 MMHG | HEART RATE: 84 BPM | OXYGEN SATURATION: 97 % | BODY MASS INDEX: 28.87 KG/M2 | TEMPERATURE: 98.2 F

## 2017-09-19 DIAGNOSIS — C50.912 MALIGNANT NEOPLASM OF LEFT FEMALE BREAST, UNSPECIFIED ESTROGEN RECEPTOR STATUS, UNSPECIFIED SITE OF BREAST (H): Primary | ICD-10-CM

## 2017-09-19 DIAGNOSIS — C50.912 MALIGNANT NEOPLASM OF LEFT BREAST IN FEMALE, ESTROGEN RECEPTOR POSITIVE, UNSPECIFIED SITE OF BREAST (H): Primary | ICD-10-CM

## 2017-09-19 DIAGNOSIS — C50.912 MALIGNANT NEOPLASM OF LEFT BREAST IN FEMALE, ESTROGEN RECEPTOR POSITIVE, UNSPECIFIED SITE OF BREAST (H): ICD-10-CM

## 2017-09-19 DIAGNOSIS — C50.112 CANCER OF CENTRAL PORTION OF LEFT BREAST (H): ICD-10-CM

## 2017-09-19 DIAGNOSIS — Z17.0 MALIGNANT NEOPLASM OF LEFT BREAST IN FEMALE, ESTROGEN RECEPTOR POSITIVE, UNSPECIFIED SITE OF BREAST (H): ICD-10-CM

## 2017-09-19 DIAGNOSIS — Z80.3 FAMILY HISTORY OF MALIGNANT NEOPLASM OF BREAST: ICD-10-CM

## 2017-09-19 DIAGNOSIS — C79.51 BONE METASTASIS: ICD-10-CM

## 2017-09-19 DIAGNOSIS — Z17.0 MALIGNANT NEOPLASM OF LEFT BREAST IN FEMALE, ESTROGEN RECEPTOR POSITIVE, UNSPECIFIED SITE OF BREAST (H): Primary | ICD-10-CM

## 2017-09-19 LAB
ALBUMIN SERPL-MCNC: 3.2 G/DL (ref 3.4–5)
ALP SERPL-CCNC: 44 U/L (ref 40–150)
ALT SERPL W P-5'-P-CCNC: 21 U/L (ref 0–50)
ANION GAP SERPL CALCULATED.3IONS-SCNC: 6 MMOL/L (ref 3–14)
AST SERPL W P-5'-P-CCNC: 19 U/L (ref 0–45)
BASOPHILS # BLD AUTO: 0 10E9/L (ref 0–0.2)
BASOPHILS NFR BLD AUTO: 0.6 %
BILIRUB SERPL-MCNC: 0.5 MG/DL (ref 0.2–1.3)
BUN SERPL-MCNC: 10 MG/DL (ref 7–30)
CALCIUM SERPL-MCNC: 8.5 MG/DL (ref 8.5–10.1)
CANCER AG27-29 SERPL-ACNC: <5 U/ML (ref 0–39)
CEA SERPL-MCNC: <0.5 UG/L (ref 0–2.5)
CHLORIDE SERPL-SCNC: 106 MMOL/L (ref 94–109)
CO2 SERPL-SCNC: 28 MMOL/L (ref 20–32)
CREAT SERPL-MCNC: 0.76 MG/DL (ref 0.52–1.04)
DIFFERENTIAL METHOD BLD: NORMAL
EOSINOPHIL # BLD AUTO: 0.2 10E9/L (ref 0–0.7)
EOSINOPHIL NFR BLD AUTO: 3.2 %
ERYTHROCYTE [DISTWIDTH] IN BLOOD BY AUTOMATED COUNT: 12.9 % (ref 10–15)
GFR SERPL CREATININE-BSD FRML MDRD: 77 ML/MIN/1.7M2
GLUCOSE SERPL-MCNC: 86 MG/DL (ref 70–99)
HCT VFR BLD AUTO: 37.7 % (ref 35–47)
HGB BLD-MCNC: 11.9 G/DL (ref 11.7–15.7)
IMM GRANULOCYTES # BLD: 0 10E9/L (ref 0–0.4)
IMM GRANULOCYTES NFR BLD: 0.1 %
LYMPHOCYTES # BLD AUTO: 2.6 10E9/L (ref 0.8–5.3)
LYMPHOCYTES NFR BLD AUTO: 37.9 %
MCH RBC QN AUTO: 29.9 PG (ref 26.5–33)
MCHC RBC AUTO-ENTMCNC: 31.6 G/DL (ref 31.5–36.5)
MCV RBC AUTO: 95 FL (ref 78–100)
MONOCYTES # BLD AUTO: 0.5 10E9/L (ref 0–1.3)
MONOCYTES NFR BLD AUTO: 6.9 %
NEUTROPHILS # BLD AUTO: 3.6 10E9/L (ref 1.6–8.3)
NEUTROPHILS NFR BLD AUTO: 51.3 %
NRBC # BLD AUTO: 0 10*3/UL
NRBC BLD AUTO-RTO: 0 /100
PLATELET # BLD AUTO: 216 10E9/L (ref 150–450)
POTASSIUM SERPL-SCNC: 4 MMOL/L (ref 3.4–5.3)
PROT SERPL-MCNC: 7.7 G/DL (ref 6.8–8.8)
RBC # BLD AUTO: 3.98 10E12/L (ref 3.8–5.2)
SODIUM SERPL-SCNC: 140 MMOL/L (ref 133–144)
WBC # BLD AUTO: 6.9 10E9/L (ref 4–11)

## 2017-09-19 PROCEDURE — 25000132 ZZH RX MED GY IP 250 OP 250 PS 637: Mod: ZF | Performed by: INTERNAL MEDICINE

## 2017-09-19 PROCEDURE — 96375 TX/PRO/DX INJ NEW DRUG ADDON: CPT

## 2017-09-19 PROCEDURE — 86300 IMMUNOASSAY TUMOR CA 15-3: CPT | Performed by: INTERNAL MEDICINE

## 2017-09-19 PROCEDURE — T1013 SIGN LANG/ORAL INTERPRETER: HCPCS | Mod: U3,ZF

## 2017-09-19 PROCEDURE — 25000128 H RX IP 250 OP 636: Mod: ZF | Performed by: INTERNAL MEDICINE

## 2017-09-19 PROCEDURE — 82378 CARCINOEMBRYONIC ANTIGEN: CPT | Performed by: INTERNAL MEDICINE

## 2017-09-19 PROCEDURE — 96040 ZZH GENETIC COUNSELING, EACH 30 MINUTES: CPT | Mod: ZF | Performed by: GENETIC COUNSELOR, MS

## 2017-09-19 PROCEDURE — T1013 SIGN LANG/ORAL INTERPRETER: HCPCS | Mod: U3

## 2017-09-19 PROCEDURE — 80053 COMPREHEN METABOLIC PANEL: CPT | Performed by: INTERNAL MEDICINE

## 2017-09-19 PROCEDURE — 40000795 ZZHCL STATISTIC DNA PROCESS AND HOLD: Performed by: COLON & RECTAL SURGERY

## 2017-09-19 PROCEDURE — 96372 THER/PROPH/DIAG INJ SC/IM: CPT | Mod: 59

## 2017-09-19 PROCEDURE — 85025 COMPLETE CBC W/AUTO DIFF WBC: CPT | Performed by: INTERNAL MEDICINE

## 2017-09-19 PROCEDURE — S0028 INJECTION, FAMOTIDINE, 20 MG: HCPCS | Mod: ZF | Performed by: INTERNAL MEDICINE

## 2017-09-19 PROCEDURE — 96413 CHEMO IV INFUSION 1 HR: CPT

## 2017-09-19 PROCEDURE — 25000125 ZZHC RX 250: Mod: ZF | Performed by: INTERNAL MEDICINE

## 2017-09-19 RX ORDER — PROCHLORPERAZINE MALEATE 10 MG
10 TABLET ORAL EVERY 6 HOURS PRN
Qty: 30 TABLET | Refills: 2 | Status: SHIPPED | OUTPATIENT
Start: 2017-09-19 | End: 2017-09-20

## 2017-09-19 RX ORDER — HEPARIN SODIUM (PORCINE) LOCK FLUSH IV SOLN 100 UNIT/ML 100 UNIT/ML
5 SOLUTION INTRAVENOUS DAILY PRN
Status: DISCONTINUED | OUTPATIENT
Start: 2017-09-19 | End: 2017-09-19 | Stop reason: HOSPADM

## 2017-09-19 RX ORDER — EPINEPHRINE 0.3 MG/.3ML
INJECTION SUBCUTANEOUS
Status: DISCONTINUED
Start: 2017-09-19 | End: 2017-09-19 | Stop reason: WASHOUT

## 2017-09-19 RX ORDER — ACETAMINOPHEN 325 MG/1
650 TABLET ORAL ONCE
Status: COMPLETED | OUTPATIENT
Start: 2017-09-19 | End: 2017-09-19

## 2017-09-19 RX ORDER — DIPHENHYDRAMINE HCL 25 MG
50 CAPSULE ORAL ONCE
Status: COMPLETED | OUTPATIENT
Start: 2017-09-19 | End: 2017-09-19

## 2017-09-19 RX ORDER — HEPARIN SODIUM (PORCINE) LOCK FLUSH IV SOLN 100 UNIT/ML 100 UNIT/ML
5 SOLUTION INTRAVENOUS EVERY 8 HOURS
Status: DISCONTINUED | OUTPATIENT
Start: 2017-09-19 | End: 2017-09-19 | Stop reason: HOSPADM

## 2017-09-19 RX ADMIN — FAMOTIDINE 20 MG: 20 INJECTION, SOLUTION INTRAVENOUS at 10:11

## 2017-09-19 RX ADMIN — ACETAMINOPHEN 650 MG: 325 TABLET ORAL at 09:43

## 2017-09-19 RX ADMIN — SODIUM CHLORIDE 250 ML: 9 INJECTION, SOLUTION INTRAVENOUS at 09:42

## 2017-09-19 RX ADMIN — SODIUM CHLORIDE, PRESERVATIVE FREE 5 ML: 5 INJECTION INTRAVENOUS at 11:53

## 2017-09-19 RX ADMIN — DENOSUMAB 120 MG: 120 INJECTION SUBCUTANEOUS at 12:29

## 2017-09-19 RX ADMIN — DEXAMETHASONE SODIUM PHOSPHATE 20 MG: 10 INJECTION, SOLUTION INTRAMUSCULAR; INTRAVENOUS at 09:42

## 2017-09-19 RX ADMIN — DIPHENHYDRAMINE HYDROCHLORIDE 50 MG: 25 CAPSULE ORAL at 09:43

## 2017-09-19 RX ADMIN — TRASTUZUMAB 636 MG: 150 INJECTION, POWDER, LYOPHILIZED, FOR SOLUTION INTRAVENOUS at 10:25

## 2017-09-19 RX ADMIN — SODIUM CHLORIDE, PRESERVATIVE FREE 5 ML: 5 INJECTION INTRAVENOUS at 09:17

## 2017-09-19 ASSESSMENT — PAIN SCALES - GENERAL: PAINLEVEL: NO PAIN (0)

## 2017-09-19 NOTE — NURSING NOTE
Port accessed by RN , pt tolerated well. Labs collected and sent, line flushed with NS and heparin. Vitals taken and pt checked in for next appt. Rosa Machado

## 2017-09-19 NOTE — NURSING NOTE
Chief Complaint   Patient presents with     Blood Draw     lab only-RN rosalie lab from right hand per pt request.      Rosa Machado

## 2017-09-19 NOTE — PATIENT INSTRUCTIONS
Contact Numbers  [facility] [place] Main Line/After Hours: [phone]  [facility] [place] Triage line: [phone]      Please call the triage or after hours line if you experience a temperature greater than or equal to 100.5, shaking chills, have uncontrolled nausea, vomiting and/or diarrhea, dizziness, shortness of breath, chest pain, bleeding, unexplained bruising, or if you have any other new/concerning symptoms, questions or concerns.      If you are having any concerning symptoms or wish to speak to a provider before your next infusion visit, please call to notify us so we can adequately serve you.     If you need a refill on a narcotic prescription or other medication, please call before your infusion appointment.             September 2017 Sunday Monday Tuesday Wednesday Thursday Friday Saturday                            1     2       3     4     5     6     7     8     9       10     11     12     13     14     [facility] RETURN    9:15 AM   (120 min.)   Lisandro Aguiar MD   [facility] 15     Outpatient Visit   10:43 AM   [facility]     IR CHEST PORT PLACEMENT >5 YRS   11:00 AM   (60 min.)   [id number]   [facility]     /[facility] [place]   11:00 AM   (90 min.)   [facility]    Services Department     /[facility] [place]   12:30 PM   (120 min.)   [facility]    Services Department     INSERT PORT VASCULAR ACCESS   12:30 PM   Dmitriy Hernandez PA-C    OR 16       17     18     19     [facility] DRAW    8:45 AM   (45 min.)    [facility] DRAW   [facility] Lab Draw     [facility] ONC INFUSION 180    9:30 AM   (180 min.)    ONCOLOGY INFUSION   [facility] NEW WITH ROOM    2:00 PM   (120 min.)   Lianne Saavedra GC   [facility] DRAW    3:45 PM   (15 min.)    [facility] DRAW   [facility] Lab Draw 20     21     22     23       24      25     26     27     28     29     30 October 2017 Sunday Monday Tuesday Wednesday Thursday Friday Saturday   1     2     3     4     5     6     7       8     9     10     [facility] DRAW   12:30 PM   (15 min.)    [facility]   [facility]     [facility] RETURN   12:45 PM   (50 min.)   Anne Lea PA   [facility]     [facility] ONC INFUSION 60    2:00 PM   (60 min.)   [facility] ONCOLOGY INFUSION   [facility] 11     12     13     14       15     16     17     18     19     20     21       22     23     24     25     26     27     28       29     30     31     [facility] DRAW    1:30 PM   (15 min.)    [facility] DRAW   [facility]     [facility] RETURN    1:45 PM   (30 min.)   Lsiandro Aguiar MD   [facility]     [facility] ONC INFUSION 60    3:00 PM   (60 min.)    ONCOLOGY INFUSION   [facility]                                  Lab Results:  Recent Results (from the past 12 hour(s))   CBC with platelets differential    Collection Time: 09/19/17  9:16 AM   Result Value Ref Range    WBC 6.9 4.0 - 11.0 10e9/L    RBC Count 3.98 3.8 - 5.2 10e12/L    Hemoglobin 11.9 11.7 - 15.7 g/dL    Hematocrit 37.7 35.0 - 47.0 %    MCV 95 78 - 100 fl    MCH 29.9 26.5 - 33.0 pg    MCHC 31.6 31.5 - 36.5 g/dL    RDW 12.9 10.0 - 15.0 %    Platelet Count 216 150 - 450 10e9/L    Diff Method Automated Method     % Neutrophils 51.3 %    % Lymphocytes 37.9 %    % Monocytes 6.9 %    % Eosinophils 3.2 %    % Basophils 0.6 %    % Immature Granulocytes 0.1 %    Nucleated RBCs 0 0 /100    Absolute Neutrophil 3.6 1.6 - 8.3 10e9/L    Absolute Lymphocytes 2.6 0.8 - 5.3 10e9/L    Absolute Monocytes 0.5 0.0 - 1.3 10e9/L    Absolute Eosinophils 0.2 0.0 - 0.7 10e9/L    Absolute Basophils 0.0 0.0 - 0.2 10e9/L    Abs Immature Granulocytes 0.0 0 - 0.4 10e9/L    Absolute Nucleated RBC 0.0    Comprehensive metabolic panel     Collection Time: 09/19/17  9:16 AM   Result Value Ref Range    Sodium 140 133 - 144 mmol/L    Potassium 4.0 3.4 - 5.3 mmol/L    Chloride 106 94 - 109 mmol/L    Carbon Dioxide 28 20 - 32 mmol/L    Anion Gap 6 3 - 14 mmol/L    Glucose 86 70 - 99 mg/dL    Urea Nitrogen 10 7 - 30 mg/dL    Creatinine 0.76 0.52 - 1.04 mg/dL    GFR Estimate 77 >60 mL/min/1.7m2    GFR Estimate If Black >90 >60 mL/min/1.7m2    Calcium 8.5 8.5 - 10.1 mg/dL    Bilirubin Total 0.5 0.2 - 1.3 mg/dL    Albumin 3.2 (L) 3.4 - 5.0 g/dL    Protein Total 7.7 6.8 - 8.8 g/dL    Alkaline Phosphatase 44 40 - 150 U/L    ALT 21 0 - 50 U/L    AST 19 0 - 45 U/L

## 2017-09-19 NOTE — PROGRESS NOTES
9/19/2017    Referring Provider: Lisandro Aguiar MD    Presenting Information:   I met with Hoda Brush today, with the aid of a Azerbaijani , for genetic counseling at the Cancer Risk Management Program at the [facility] to discuss her personal and family history of breast cancer. She is here today to review this history, cancer screening recommendations, and available genetic testing options.    Personal History:  Hoda is a 62 year old female. She was diagnosed with invasive ductal carcinoma of the left breast at age 59; the tumor is ER/[place]/Her2+. Treatment has included a mastectomy, chemotherapy, and Tamoxifen.       She had her first menstrual period at age 16, her first child at age 22, and is postmenopausal. Hoda has her ovaries, fallopian tubes and uterus in place. She reports that she was having regular transvaginal ultrasounds and her last exam in March 2017 was normal. She reports that she has never used hormone replacement therapy.      Her most recent OB-GYN exam and Pap smear in August 2017 were normal. She has not had a colonoscopy. She does not regularly do any other cancer screening at this time. Hoda reported no tobacco or alcohol use.    Family History: (Please see scanned pedigree for detailed family history information)    One sister is 66 and was diagnosed with breast cancer at age 62; treatment included a lumpectomy and radiation. She and her daughter have both pursued genetic testing, which was negative. Further details are unavailable.    Hoda's mother is 87 and was diagnosed with breast cancer at age 45; treatment included a mastectomy and chemotherapy. She was then diagnosed with a new breast cancer at age 70; treatment included a mastectomy. She reportedly also pursued genetic testing, which was negative.    Hoda's father was diagnosed with lung cancer and passed away at age 67; he had a history of smoking.    Limited information is available regarding  extended maternal and paternal relatives.    Her maternal and paternal ethnicity is Iranian and Ashkenazi [employer]. There is no reported consanguinity.    Discussion:    Hoda's personal and family history of breast cancer is suggestive of a hereditary cancer syndrome.    We reviewed the features of sporadic, familial, and hereditary cancers. In looking at Hoda's family history, it is possible that a cancer susceptibility gene is present as she, her sister, and her mother were also diagnosed with breast cancer. Her mother was also diagnosed with her first cancer under age 50, as well as a second new breast cancer. The limited information available regarding extended relatives does otherwise limit a complete risk assessment.    We discussed the natural history and genetics of several hereditary cancer syndromes, including Hereditary Breast and Ovarian Cancer (HBOC) syndrome. A detailed handout regarding these syndromes and the information we discussed was provided to Hoda at the end of our appointment today and can be found in the after visit summary. Topics included: inheritance pattern, cancer risks, cancer screening recommendations, and also risks, benefits and limitations of testing.    We reviewed that the most common cause of hereditary breast cancer is HBOC syndrome, which is caused by mutations in the BRCA1 and BRCA2 genes. Individuals with HBOC syndrome are at increased risk for several different cancers, including breast, ovarian, male breast, prostate, melanoma, and pancreatic cancer.  We discussed that there are three common mutations in the BRCA genes that are common in the Ashkenazi [employer] population. About 1 in 40 individuals of Ashkenazi [employer] background have one of these three mutations, which account for about 90% of the BRCA mutations in this population.     Based on her personal and family history, Hoda meets current National Comprehensive Cancer Network (NCCN) criteria for genetic  testing of the BRCA1 and BRCA2 genes.      We discussed that it is not currently clear what genetic testing her relatives pursued, if it included the BRCA1 and BRCA2 genes, or how this affects Hoda. Though her sister's and mother's reportedly negative genetic testing may reduce the chance Hoda carries an identifiable gene mutation, it does not eliminate the chance. There remains the possibility that Hoda has a separate genetic cause for her cancer, potentially inherited from her father.    As such, we discussed that there are also other additional genes that could cause increased risk for breast cancer beyond the BRCA1 and BRCA2 genes. As many of these genes present with overlapping features in a family, it would be reasonable for Hoda to consider panel genetic testing to analyze multiple genes at once.  We reviewed genetic testing options for hereditary breast cancer: guidelines-based high and moderate risk panel testing (Breast Actionable Panel, 11 genes) and expanded high and moderate risk panel testing (Breast Expanded Panel, 18 genes). Hoda expressed interest in only the most high risk and actionable genes. She opted for the Breast Actionable Panel.  Genetic testing is available for 11 genes associated with high or moderate risk for breast cancer: Breast Actionable Panel (DEWEY, BRCA1, BRCA2, CDH1, CHEK2, NBN, NF1, PALB2, STK11,  PTEN, and TP53).  We discussed that some of the genes in the Breast Actionable Panel are associated with specific hereditary cancer syndromes and published management guidelines: HBOC syndrome (BRCA1, BRCA2), Hereditary Diffuse Gastric Cancer (CDH1), Peutz-Jeghers syndrome (STK11), Neurofibromatosis type 1 (NF1), Cowden syndrome (PTEN), and Li Fraumeni syndrome (TP53).    The remaining genes (DEWEY, CHEK2, NBN, and PALB2) are associated with moderate breast cancer risk and have published screening guidelines.  Hoda was provided with a brochure from the Cancer Risk Management  Program explaining the Breast Actionable Panel testing. This information can be seen in the patient instructions section.  With the help of the , consent was obtained and genetic testing for the Breast Actionable Panel was sent to the Molecular Diagnostics Laboratory at the [facility]. Turn around time: 4-6 weeks after insurance pre-authorization is obtained.    Medical Management: For Hoda, we reviewed that the information gathered from genetic testing may determine:     additional cancer screening for which she may qualify (i.e. more frequent colonoscopies, annual dermatologic exams, etc.),     options for risk reducing surgeries (i.e. surgery to remove her ovaries and/or uterus, etc.),      and targeted chemotherapies to treat Hoda's active cancer, or if she develops certain cancers in the future (i.e. BRCA-specific chemotherapies, etc.).     These recommendations and possible targeted chemotherapies will be discussed in detail once genetic testing is completed.     Plan:  1) Today Hoda elected to proceed with genetic testing using the Breast Actionable Panel offered by the [facility] Molecular Diagnostics Laboratory.  2) This information should be available in 4-6 weeks after insurance pre-authorization is obtained.  3) Hoda will return to clinic to discuss the results.    Face to face time: 45 minutes    Lianne Saaverda MS, [facility] [place]  Certified Genetic Counselor  Office: [phone]  Pager: [phone]

## 2017-09-19 NOTE — LETTER
Cancer Risk Management  Program Locations    [facility]  [facility]  Mailing Address  Cancer Risk Management Program  [facility]  [address]  [place] [address]    New patient appointments  [phone]  September 20, 2017    Hoda KIMBLE Gonsalo  [address]  [place] [address]      Dear Hoda,    It was a pleasure meeting with you at the [facility] on September 19, 2017. Here is a copy of the progress note from your recent genetic counseling visit to the Cancer Risk Management Program. If you have any additional questions, please feel free to call.    9/19/2017    Referring Provider: Lisandro Aguiar MD    Presenting Information:   I met with Hoda Gonsalo today, with the aid of a Albanian , for genetic counseling at the Cancer Risk Management Program at the [facility] to discuss her personal and family history of breast cancer. She is here today to review this history, cancer screening recommendations, and available genetic testing options.    Personal History:  Hoda is a 62 year old female. She was diagnosed with invasive ductal carcinoma of the left breast at age 59; the tumor is ER/[place]/Her2+. Treatment has included a mastectomy, chemotherapy, and Tamoxifen.       She had her first menstrual period at age 16, her first child at age 22, and is postmenopausal. Hoda has her ovaries, fallopian tubes and uterus in place. She reports that she was having regular transvaginal ultrasounds and her last exam in March 2017 was normal. She reports that she has never used hormone replacement therapy.      Her most recent OB-GYN exam and Pap smear in August 2017 were normal. She has not had a colonoscopy. She does not regularly do any other cancer screening at this time. Hoda  reported no tobacco or alcohol use.    Family History: (Please see scanned pedigree for detailed family history information)    One sister is 66 and was diagnosed with breast cancer at age 62; treatment included a lumpectomy and radiation. She and her daughter have both pursued genetic testing, which was negative. Further details are unavailable.    Hoda's mother is 87 and was diagnosed with breast cancer at age 45; treatment included a mastectomy and chemotherapy. She was then diagnosed with a new breast cancer at age 70; treatment included a mastectomy. She reportedly also pursued genetic testing, which was negative.    Hoda's father was diagnosed with lung cancer and passed away at age 67; he had a history of smoking.    Limited information is available regarding extended maternal and paternal relatives.    Her maternal and paternal ethnicity is Divehi and Ashkenazi [employer]. There is no reported consanguinity.    Discussion:    Hoda's personal and family history of breast cancer is suggestive of a hereditary cancer syndrome.    We reviewed the features of sporadic, familial, and hereditary cancers. In looking at Hoda's family history, it is possible that a cancer susceptibility gene is present as she, her sister, and her mother were also diagnosed with breast cancer. Her mother was also diagnosed with her first cancer under age 50, as well as a second new breast cancer. The limited information available regarding extended relatives does otherwise limit a complete risk assessment.    We discussed the natural history and genetics of several hereditary cancer syndromes, including Hereditary Breast and Ovarian Cancer (HBOC) syndrome. A detailed handout regarding these syndromes and the information we discussed was provided to Hoda at the end of our appointment today and can be found in the after visit summary. Topics included: inheritance pattern, cancer risks, cancer screening recommendations, and also  risks, benefits and limitations of testing.    We reviewed that the most common cause of hereditary breast cancer is HBOC syndrome, which is caused by mutations in the BRCA1 and BRCA2 genes. Individuals with HBOC syndrome are at increased risk for several different cancers, including breast, ovarian, male breast, prostate, melanoma, and pancreatic cancer.  We discussed that there are three common mutations in the BRCA genes that are common in the [employer]kenazi [employer] population. About 1 in 40 individuals of [employer]kenazi [employer] background have one of these three mutations, which account for about 90% of the BRCA mutations in this population.     Based on her personal and family history, Hoda meets current National Comprehensive Cancer Network (NCCN) criteria for genetic testing of the BRCA1 and BRCA2 genes.      We discussed that it is not currently clear what genetic testing her relatives pursued, if it included the BRCA1 and BRCA2 genes, or how this affects Hoda. Though her sister's and mother's reportedly negative genetic testing may reduce the chance Hoda carries an identifiable gene mutation, it does not eliminate the chance. There remains the possibility that Hoda has a separate genetic cause for her cancer, potentially inherited from her father.    As such, we discussed that there are also other additional genes that could cause increased risk for breast cancer beyond the BRCA1 and BRCA2 genes. As many of these genes present with overlapping features in a family, it would be reasonable for Hoda to consider panel genetic testing to analyze multiple genes at once.  We reviewed genetic testing options for hereditary breast cancer: guidelines-based high and moderate risk panel testing (Breast Actionable Panel, 11 genes) and expanded high and moderate risk panel testing (Breast Expanded Panel, 18 genes). Hoda expressed interest in only the most high risk and actionable genes. She opted for the Breast  Actionable Panel.  Genetic testing is available for 11 genes associated with high or moderate risk for breast cancer: Breast Actionable Panel (DEWEY, BRCA1, BRCA2, CDH1, CHEK2, NBN, NF1, PALB2, STK11,  PTEN, and TP53).  We discussed that some of the genes in the Breast Actionable Panel are associated with specific hereditary cancer syndromes and published management guidelines: HBOC syndrome (BRCA1, BRCA2), Hereditary Diffuse Gastric Cancer (CDH1), Peutz-Jeghers syndrome (STK11), Neurofibromatosis type 1 (NF1), Cowden syndrome (PTEN), and Li Fraumeni syndrome (TP53).    The remaining genes (DEWEY, CHEK2, NBN, and PALB2) are associated with moderate breast cancer risk and have published screening guidelines.  Hoda was provided with a brochure from the Cancer Risk Management Program explaining the Breast Actionable Panel testing. This information can be seen in the patient instructions section.  With the help of the , consent was obtained and genetic testing for the Breast Actionable Panel was sent to the Molecular Diagnostics Laboratory at the [facility]. Turn around time: 4-6 weeks after insurance pre-authorization is obtained.    Medical Management: For oHda, we reviewed that the information gathered from genetic testing may determine:     additional cancer screening for which she may qualify (i.e. more frequent colonoscopies, annual dermatologic exams, etc.),     options for risk reducing surgeries (i.e. surgery to remove her ovaries and/or uterus, etc.),      and targeted chemotherapies to treat Hoda's active cancer, or if she develops certain cancers in the future (i.e. BRCA-specific chemotherapies, etc.).     These recommendations and possible targeted chemotherapies will be discussed in detail once genetic testing is completed.     Plan:  1) Today Hoda elected to proceed with genetic testing using the Breast Actionable Panel offered by the [facility]  Molecular Diagnostics Laboratory.  2) This information should be available in 4-6 weeks after insurance pre-authorization is obtained.  3) Hoda will return to clinic to discuss the results.    Face to face time: 45 minutes    Lianne Saavedra MS, [facility] [place]  Certified Genetic Counselor  Office: [phone]  Pager: [phone]

## 2017-09-19 NOTE — MR AVS SNAPSHOT
After Visit Summary   9/19/2017    Hoda Brush    MRN: [id number]           Patient Information     Date Of Birth          1955        Visit Information        Provider Department      9/19/2017 2:00 PM Sheeba Cifuentes; Lianne Saavedra, ;  [address] [facility]        Today's Diagnoses     Malignant neoplasm of left breast in female, estrogen receptor positive, unspecified site of breast (H)    -  1    Family history of malignant neoplasm of breast          Care Instructions            Breast Actionable Panel  The Breast Actionable cancer panel is a genetic test which analyzes 11 genes known to be associated with an increased lifetime risk of breast and other cancers. Published medical guidelines exist for detection, prevention, risk reduction, and/or treatment for individuals with mutations in these genes.      Assessing Cancer Risk  Only about 5-10% of cancers are thought to be due to an inherited cancer susceptibility gene.    These families often have:    Several people with the same or related types of cancer    Cancers diagnosed at a young age (before age 50)    Individuals with more than one primary cancer    Multiple generations of the family affected with cancer    Some people may be candidates for genetic testing of more than one gene.  For these families, genetic testing using a multi-gene cancer panel may be offered.  These panels may test genes known to increase the risk for breast (and other) cancers: DEWEY, BRCA1, BRCA2, CDH1, CHEK2, PALB2, PTEN, and TP53.  The purpose of this handout is to serve as a brief summary of the high/moderate-risk breast cancer genes which have published clinical management guidelines for individuals who are found to carry a mutation.    BRCA1 and BRCA2-Hereditary Breast and Ovarian Cancer Syndrome (HBOC)  A single mutation in one of the copies of BRCA1 or BRCA2 increases the risk for breast and ovarian cancer, among  others.  The risk for pancreatic cancer and melanoma may also be slightly increased in some families.  The tables below list the chance that someone with a BRCA mutation would develop cancer in his or her lifetime1,2,3,4.          Women   Men     General Population BRCA+    General Population BRCA+   Breast  12% 40-80%  Breast <1% ~7%   Ovarian  1-2% 10-40%  Prostate 16% 20%           A person s ethnic background is also important to consider, as individuals of Ashkenazi Pentec[employer] ancestry have a higher chance of having a BRCA gene mutation.  There are three BRCA mutations that occur more frequently in this population.     Individuals who inherit two BRCA2 mutations--one from their mother and one from their father--have a condition called Fanconi Anemia.  This rare autosomal recessive condition is associated with short stature, developmental delay, bone marrow failure, and increased risk for childhood cancers.    TP53-Li-Fraumeni Syndrome (LFS)  LFS is a cancer predisposition syndrome. Individuals with LFS are at an increased risk for developing cancer at a young age. The general lifetime risk for development of cancer is 50% by age 30 and 90% by age 60.  LFS is caused by a mutation in the TP53 gene.  A single mutation in one of the copies of TP53 increases the risk for multiple cancers.     Core Cancers: Sarcomas, Breast, Brain, Lung, Leukemias/Lymphomas, Adrenocortical carcinomas  Other Cancers: Gastrointestinal, Thyroid, Skin, Genitourinary    PTEN-Cowden Syndrome  Cowden syndrome is a hereditary condition that increases the risk for breast, thyroid, endometrial, and kidney cancer.  Cowden syndrome is caused by a mutation in the PTEN gene.  A single mutation in one of the copies of PTEN causes Cowden syndrome and increases cancer risk.  The table below shows the chance that someone with a PTEN mutation would develop cancer in their lifetime5,6.  Other benign features seen in some individuals with Cowden syndrome  include benign skin lesions (facial papules, keratoses, lipomas), learning disability, autism, thyroid nodules, colon polyps, and larger head size.      Lifetime Cancer Risk   Cancer Type General Population Cowden Syndrome   Breast  12% 25-50%*   Thyroid  1% 35%   Renal 1-2% 35%   Endometrial  2-3% 28%   Colon 5% 9%   Melanoma 2-3% >5%     *One recent study found breast cancer risk to be increased to 85%                CDH1-Hereditary Diffuse Gastric Cancer (HDGC)  Currently, one gene is known to cause hereditary diffuse gastric cancer: CDH1.  Individuals with HDGC are at increased risk for diffuse gastric cancer and lobular breast cancer. Of people diagnosed with HDGC, 30-50% have a mutation in the CDH1 gene.  This suggests there are likely other genes that may cause HDGC that have not been identified yet.      Lifetime Cancer Risks    General Pop HDGC    Diffuse Gastric  <1% ~80%   Breast 12% 39-52%       Additional Genes Associated with Increased Breast Cancer Risk  PALB2  Mutations in PALB2 have been shown to increase the risk of breast cancer up to 33-58% in some families; where individuals fall within this risk range is dependent upon family history.9 PALB2 mutations have also been associated with increased risk for pancreatic cancer, although this risk has not been quantified yet.  Individuals who inherit two PALB2 mutations--one from their mother and one from their father--have a condition called Fanconi Anemia.  This rare autosomal recessive condition is associated with short stature, developmental delay, bone marrow failure, and increased risk for childhood cancers.    DEWEY  DEWEY is a moderate-risk breast cancer gene. Women who have a mutation in DEWEY can have between a 2-4 fold increased risk for breast cancer compared to the general population.10  DEWEY mutations have also been associated with increased risk for pancreatic cancer, however an estimate of this cancer risk is not well understood.11  Individuals  who inherit two DEWEY mutations have a condition called ataxia-telangiectasia (AT).  This rare autosomal recessive condition affects the nervous system and immune system, and is associated with progressive cerebellar ataxia beginning in childhood.  Individuals with ataxia-telangiectasia often have a weakened immune system and have an increased risk for childhood cancers.         CHEK2   CHEK2 is a moderate-risk breast cancer gene.  Women who have a mutation in CHEK2 have around a 2-fold increased risk for breast cancer compared to the general population, and this risk may be higher depending upon family history.12,13,14 Mutations in CHEK2 have also been shown to increase the risk of a number of other cancers, including colon and prostate, however these cancer risks are currently not well understood.      Note that the table at the end of the handout includes additional moderate breast cancer genes.         Inheritance   All of the genes reviewed above are inherited in an autosomal dominant pattern.  This means that if a parent has a mutation, each of his or her children will have a 50% chance of inheriting that same mutation.  Therefore, each child--male or female--would have a 50% chance of being at increased risk for developing cancer.        Image obtained from Genetics Home Reference, 2013     In rare cases, there can be mutations in both copies of these genes (one from each parent), leading to different autosomal recessive conditions.  Mutations in some genes can occur de vidhi, which means that a person s mutation occurred for the first time in them and was not inherited from a parent.  Now that they have the mutation, however, it can be passed on to future generations.     Genetic Testing  Genetic testing involves a blood test and will look for any harmful mutations within genes that are associated with increased cancer risk.  If possible, it is recommended that the person(s) who has had cancer be tested before  other family members.  That person will give us the most useful information about whether or not a specific gene is associated with the cancer in the family.    Results  There are three possible results of genetic testing:    Positive--a harmful mutation was identified in one or more of the genes    Negative--no mutation was identified in any of the genes on this panel    Variant of unknown significance--a variation in one of the genes was identified, but it is unclear how this impacts cancer risk in the family      Advantages and Disadvantages  There are advantages and disadvantages to genetic testing.  Advantages    May clarify your cancer risk    Can help you make medical decisions    May explain the cancers in your family    May give useful information to your family members (if you share your results)    Disadvantages    Possible negative emotional impact of learning about inherited cancer risk    Uncertainty in interpreting a negative test result in some situations    Possible genetic discrimination concerns (see below)    Genetic Information Nondiscrimination Act (MARIELLA)  MARIELLA is a federal law that protects individuals from health insurance or employment discrimination based on a genetic test result alone.  Although rare, there are currently no legal discrimination protections in terms of life insurance, long term care, or disability insurances.  Visit the National Human Genome Research [place] [employer] genome.gov/18854362 to learn more.    Reducing Cancer Risk  Each of the genes listed within this handout have nationally recognized cancer screening guidelines that would be recommended for individuals who test positive.  In addition to increased cancer screening, surgeries may be offered or recommended to reduce cancer risk.  Recommendations are based upon an individual s genetic test result as well as their personal and family history of cancer.    Questions to Think About Regarding Genetic Testing    What effect  will the test result have on me and my relationship with my family members if I have an inherited gene mutation?  If I don t have a gene mutation?    Should I share my test results, and how will my family react to this news, which may also affect them?    Are my children ready to learn new information that may one day affect their own health?  Resources  FORCE: Facing Our Risk of Cancer Empowered facingourrisk.org   Bright Pink bebrightpink.org   Li-Fraumeni Syndrome Association lfsassociation.org   PTEN World PTENworld.com   No stomach for cancer, Inc. nostomachforcancer.org   Stomach cancer relief network scrnet.org   Collaborative Group of the Americas on Inherited Colorectal Cancer (CGA) cgaicc.com    Cancer Care cancercare.org   American Cancer Society (ACS) cancer.org   National Cancer Call (NCI) cancer.gov     Cancer Risk Management Program [phone]-[facility]-C[facility]ER ([phone])  ? Azeb Martha, MS, [facility]  [phone]  ? Tnaya Barton, MS, [facility]  [phone]  ? Lianne Saavedra, MS, [facility]  [phone]  ? Leonora Mcneil, MS, [facility]  [phone]  ? Jarrett Bowman, MS, [facility]  [phone]    References  1. Ethel Betancourt PDP, Jose S, Lillian COON, Rowdy JE, Slime JL, Bhargav N, Heath H, Bello O, Darya A, Flores B, Ananya P, Georgi S, Frank DM, Prem N, Cliff E, Chloe H, Hernando E, Suzie J, Ludwin J, Meli B, Thomas H, Thorlacius S, Eerola H, Ignacio H, Suly K, Harinder OP. Average risks of breast and ovarian cancer associated with BRCA1 or BRCA2 mutations detected in case series unselected for family history: a combined analysis of 222 studies. Am J Hum Tiffany. 2003;72:1117-30.  2. Alicia N, Yovana M, Alphonse G.  BRCA1 and BRCA2 Hereditary Breast and Ovarian Cancer. Gene Reviews online. 2013.  3. Nelson YC, Yadi S, Harshal G, Christie S. Breast cancer risk among male BRCA1 and BRCA2 mutation carriers. J Natl Cancer Inst. 2007;99:1811-4.  4. Vidal LINDSAY, Marcos I, Cristino J, Kareen E, Les ROCHA,  Alysia MULLINS. Risk of breast cancer in male BRCA2 carriers. J Med Tiffany. 2010;47:710-1.  5. Dionisio MH, Shandra J, Hank J, Igor LA, J Luis MS, Harry C. Lifetime cancer risks in individuals with germline PTEN mutations. Clin Cancer Res. 2012;18:400-7.  6. Sara HAMMOND. Cowden Syndrome: A Critical Review of the Clinical Literature. J Tiffany . 2009:18:13-27.  7. National Comprehensive Cancer Network. Clinical practice guidelines in oncology, colorectal cancer screening. Available online (registration required). 2013.  8. National Cancer [place]. SEER Cancer Stat Fact Sheets.  December 2013.  9. Faby PENALOZA, et al. Breast-Cancer Risk in Families with Mutations in PALB2. NEJM. 2014; 371(6):497-506.  10. Madina QUIROZ, Arnol D, Houston S, Kadie P, Caren T, Hernan M, Robbie B, Mike H, Letitia R, Elaine K, Jenny L, Vidal DG, Frank D, Pete DF, Saleem MR, The Breast Cancer Susceptibility Collaboration (UK) & Hero N. DEWEY mutations that cause ataxia-telangiectasia are breast cancer susceptibility alleles. Nature Genetics. 2006;38:873-875  11. Kirk N , Sam Y, Andreina J, Sandee L, Neo GM , Devi ML, Yanetinger S, Nails AG, Syngal S, Nicolasa ML, Krhis J , Patsy R, King SZ, Vivi JR, Lolly VE, Omid M, Vogelstein B, Terell N, Nir RH, Jeanette KW, and Heller AP. DEWEY mutations in patients with hereditary pancreatic cancer. Cancer Discover. 2012;2:41-46  12. CHEK2 Breast Cancer Case-Control Consortium. CHEK2*1100delC and susceptibility to breast cancer: A collaborative analysis involving 10,860 breast cancer cases and 9,065 controls from 10 studies. Am J Hum Tiffany, 74 (2004), pp. 4512-1131  13. August T, Musa S, Fredis K, et al. Spectrum of Mutations in BRCA1, BRCA2, CHEK2, and TP53 in Families at High Risk of Breast Cancer. ARIADNA, 2006;295(12):4210-8416.   14. Michael MURGUIA, Marcell D, Latrice QUIROZ, et al. Risk of breast cancer in women with a CHEK2 mutation with and without a family  history of breast cancer. JClin Oncol. 2011;29:5135-3347.    Breast Actionable Panel      GENES (11) ASSOCIATED CANCER(S) ASSOCIATED CANCER SYNDROME(S)   DEWEY breast, pancreas    BRCA1 breast, ovary, pancreas, melanoma, prostate, male breast Hereditary breast /ovarian cancer syndrome (HBOC)   BRCA2 breast, ovary, pancreas, melanoma, prostate, male breast Hereditary breast /ovarian cancer syndrome (HBOC)   CDH1 breast, stomach, colon Hereditary diffuse gastric cancer   CHEK2 breast, colon, prostate    NBN breast, ovary, prostate    NF1 breast, brain, peripheral nervous system Neurofibromatosis 1   PALB2 breast, pancreas    PTEN breast, thyroid, uterus, colon, kidney Cowden syndrome, Npuyvxww-Vrehd-Jcbebhczx syndrome (BRRS), PTEN-related Proteus syndrome (PS), Proteus-like syndrome   STK11 breast, ovary, colon, pancreas, stomach, uterus, cervix Peutz-Jeghers syndrome   TP53 breast, bone, brain, soft tissues, adrenal cortex Li-Fraumeni syndrome         Turnaround Time  4 - 6 weeks    insurance coverage   Testing will be held until prior authorization is obtained. You will be contacted once prior authorization is completed.   About the [facility] ([facility]), [facility]  In collaborative effort with the [facility] and the [place] [employer], the MDL offers a full range of diagnostic testing services, with a strong focus on quality, accuracy, and timeliness.          Follow-ups after your visit        Your next 10 appointments already scheduled     Sep 19, 2017  3:45 PM CDT   [facility] Lab Draw with  [facility] DRAW   [facility] Draw ([facility])    [address] [place] [id number]   [phone]            Oct 10, 2017 12:30 PM CDT   [facility] Lab Draw with  [facility] LAB DRAW   [facility] Draw ([facility])    [address] [place] [address]  [facility] [address]   [phone]            Oct 10, 2017  1:00 PM CDT   (Arrive by 12:45 PM)   Return Visit with SEMAJ Walls   [facility] ([facility])    [address] [place] [address]  [address] [facility] [address]   [phone]            Oct 10, 2017  2:00 PM CDT   Infusion 60 with UC ONCOLOGY INFUSION, UC 15 ATC   [facility] ([facility])    [address] [place] [address]   [phone]            Oct 31, 2017  1:30 PM CDT   [facility]ic Lab Draw with  [facility]ON[facility] LAB DRAW   [facility] Lab Draw ([facility])    [address] [place] [address]   [phone]            Oct 31, 2017  2:00 PM CDT   (Arrive by 1:45 PM)   Return Visit with Lisandro Aguiar MD   [facility] ([facility])    [address] [place] [address]   [phone]            Oct 31, 2017  3:00 PM CDT   Infusion 60 with UC ONCOLOGY INFUSION, UC 15 ATC   [facility] ([facility])    [address] [place] [address]   [phone]              Future tests that were ordered for you today     Open Future Orders        Priority Expected Expires Ordered    Next Generation Sequencing Routine  10/19/2017 9/19/2017            Who to contact     If you have questions or need follow up information about today's clinic visit or your schedule please contact [facility] directly at [phone].  Normal or non-critical lab and imaging results will be communicated to you by MyChart, letter or phone within 4 business days after the clinic has received the results. If you do not hear from us within 7 days, please contact the clinic through MyChart or phone. If you have a critical or abnormal lab result, we will  notify you by phone as soon as possible.  Submit refill requests through [facility] or call your pharmacy and they will forward the refill request to us. Please allow 3 business days for your refill to be completed.          Additional Information About Your Visit        [employer] Information     [facility] gives you secure access to your electronic health record. If you see a primary care provider, you can also send messages to your care team and make appointments. If you have questions, please call your primary care clinic.  If you do not have a primary care provider, please call [phone] and they will assist you.        Care EveryWhere ID     This is your Care EveryWhere ID. This could be used by other organizations to access your [facility] medical records  [id number]         Blood Pressure from Last 3 Encounters:   09/19/17 119/63   09/15/17 115/75   09/14/17 144/82    Weight from Last 3 Encounters:   09/19/17 77.7 kg (171 lb 3.2 oz)   09/15/17 77 kg (169 lb 12.1 oz)   09/14/17 77.8 kg (171 lb 8 oz)                 Today's Medication Changes          These changes are accurate as of: 9/19/17  3:18 PM.  If you have any questions, ask your nurse or doctor.               Start taking these medicines.        Dose/Directions    prochlorperazine 10 MG tablet   Commonly known as:  COMPAZINE   Used for:  Malignant neoplasm of left female breast, unspecified estrogen receptor status, unspecified site of breast (H)        Dose:  10 mg   Take 1 tablet (10 mg) by mouth every 6 hours as needed (Nausea/Vomiting)   Quantity:  30 tablet   Refills:  2            Where to get your medicines      These medications were sent to [facility] [place], [place] - [address]  [address] [address]    Hours:  TRANSPLANT PHONE NUMBER [phone] Phone:  [phone]     prochlorperazine 10 MG tablet                Primary Care Provider    Physician No Ref-Primary       No  address on file        Equal Access to Services     [facility] TAYLOR : Hadii aad ku malini Ramsey, waspencerda luqluis, qaheavenly singh conchabretteo, alexa boyd saldañawillisana paula beltran. So [facility] [phone].    ATENCIÓN: Si habla español, tiene a lagunas disposición servicios gratuitos de asistencia lingüística. Llame al [phone].    We comply with applicable federal civil rights laws and [place] laws. We do not discriminate on the basis of race, color, national origin, age, disability sex, sexual orientation or gender identity.            Thank you!     Thank you for choosing [facility]  for your care. Our goal is always to provide you with excellent care. Hearing back from our patients is one way we can continue to improve our services. Please take a few minutes to complete the written survey that you may receive in the mail after your visit with us. Thank you!             Your Updated Medication List - Protect others around you: Learn how to safely use, store and throw away your medicines at www.disposemymeds.org.          This list is accurate as of: 9/19/17  3:18 PM.  Always use your most recent med list.                   Brand Name Dispense Instructions for use Diagnosis    order for DME     1 each    L UE class 2 compression sleeve and gauntlet Night garment Bandaging supplies    Lymphedema of left upper extremity       prochlorperazine 10 MG tablet    COMPAZINE    30 tablet    Take 1 tablet (10 mg) by mouth every 6 hours as needed (Nausea/Vomiting)    Malignant neoplasm of left female breast, unspecified estrogen receptor status, unspecified site of breast (H)       tamoxifen 20 MG tablet    NOLVADEX    90 tablet    Take 1 tablet (20 mg) by mouth daily    Cancer of central portion of left breast (H)       Vitamin D3 42218 UNITS Tabs

## 2017-09-19 NOTE — MR AVS SNAPSHOT
After Visit Summary   9/19/2017    Hoda Brush    MRN: [id number]           Patient Information     Date Of Birth          1955        Visit Information        Provider Department      9/19/2017 9:30 AM Wesley Iraheta;  22 ATC;  ONCOLOGY INFUSION  Services Department        Today's Diagnoses     Malignant neoplasm of left female breast, unspecified estrogen receptor status, unspecified site of breast (H)    -  1    Cancer of central portion of left breast (H)        Bone metastasis (H)          Care Instructions    Contact Numbers  [facility] Main Line/After Hours: [phone]  [facility] Triage line: [phone]      Please call the triage or after hours line if you experience a temperature greater than or equal to 100.5, shaking chills, have uncontrolled nausea, vomiting and/or diarrhea, dizziness, shortness of breath, chest pain, bleeding, unexplained bruising, or if you have any other new/concerning symptoms, questions or concerns.      If you are having any concerning symptoms or wish to speak to a provider before your next infusion visit, please call to notify us so we can adequately serve you.     If you need a refill on a narcotic prescription or other medication, please call before your infusion appointment.             September 2017 Sunday Monday Tuesday Wednesday Thursday Friday Saturday                            1     2       3     4     5     6     7     8     9       10     11     12     13     14     UMP RETURN    9:15 AM   (120 min.)   Lisandro Aguiar MD   [facility] 15     Outpatient Visit   10:43 AM   [facility]     IR CHEST PORT PLACEMENT >5 YRS   11:00 AM   (60 min.)   [id number]   [facility] Imaging     /[facility] OUTPATIENT   11:00 AM   (90 min.)   Wesley Iraheta    Services Department     /[facility] OUTPATIENT   12:30 PM   (120 min.)   [facility] CONNECTION     Services Department     INSERT PORT VASCULAR ACCESS   12:30 PM   Dmitriy Hernandez PA-C    OR 16       17     18     19     [facility] LAB DRAW    8:45 AM   (45 min.)    [facility] LAB DRAW   [facility] Draw     UMP ONC INFUSION 180    9:30 AM   (180 min.)    ONCOLOGY INFUSION   [facility]     UMP NEW WITH ROOM    2:00 PM   (120 min.)   Lianne Saavedra GC   [facility] LAB DRAW    3:45 PM   (15 min.)    [facility] LAB DRAW   [facility] Lab Draw 20     21     22     23       24     25     26     27     28     29     30 October 2017 Sunday Monday Tuesday Wednesday Thursday Friday Saturday   1     2     3     4     5     6     7       8     9     10     P [facility] LAB DRAW   12:30 PM   (15 min.)    [facility]     [facility] RETURN   12:45 PM   (50 min.)   [facility] Lea [facility]    2:00 PM   (60 min.)    ONCOLOGY INFUSION   [facility] 11     12     13     14       15     16     17     18     19     20     21       22     23     24     25     26     27     28       29     30     31     [facility] LAB DRAW    1:30 PM   (15 min.)    [facility]   [facility]     [facility] RETURN    1:45 PM   (30 min.)   Lisandro Aguiar MD   [facility]    3:00 PM   (60 min.)    ONCOLOGY INFUSION   [facility]                                  Lab Results:  Recent Results (from the past 12 hour(s))   CBC with platelets differential    Collection Time: 09/19/17  9:16 AM   Result Value Ref Range    WBC 6.9 4.0 - 11.0 10e9/L    RBC Count 3.98 3.8 - 5.2 10e12/L    Hemoglobin 11.9 11.7 - 15.7 g/dL    Hematocrit 37.7 35.0 - 47.0 %    MCV 95 78 - 100 fl    MCH 29.9 26.5 - 33.0 pg    MCHC 31.6 31.5 - 36.5 g/dL    RDW 12.9 10.0 - 15.0 %    Platelet Count 216  150 - 450 10e9/L    Diff Method Automated Method     % Neutrophils 51.3 %    % Lymphocytes 37.9 %    % Monocytes 6.9 %    % Eosinophils 3.2 %    % Basophils 0.6 %    % Immature Granulocytes 0.1 %    Nucleated RBCs 0 0 /100    Absolute Neutrophil 3.6 1.6 - 8.3 10e9/L    Absolute Lymphocytes 2.6 0.8 - 5.3 10e9/L    Absolute Monocytes 0.5 0.0 - 1.3 10e9/L    Absolute Eosinophils 0.2 0.0 - 0.7 10e9/L    Absolute Basophils 0.0 0.0 - 0.2 10e9/L    Abs Immature Granulocytes 0.0 0 - 0.4 10e9/L    Absolute Nucleated RBC 0.0    Comprehensive metabolic panel    Collection Time: 09/19/17  9:16 AM   Result Value Ref Range    Sodium 140 133 - 144 mmol/L    Potassium 4.0 3.4 - 5.3 mmol/L    Chloride 106 94 - 109 mmol/L    Carbon Dioxide 28 20 - 32 mmol/L    Anion Gap 6 3 - 14 mmol/L    Glucose 86 70 - 99 mg/dL    Urea Nitrogen 10 7 - 30 mg/dL    Creatinine 0.76 0.52 - 1.04 mg/dL    GFR Estimate 77 >60 mL/min/1.7m2    GFR Estimate If Black >90 >60 mL/min/1.7m2    Calcium 8.5 8.5 - 10.1 mg/dL    Bilirubin Total 0.5 0.2 - 1.3 mg/dL    Albumin 3.2 (L) 3.4 - 5.0 g/dL    Protein Total 7.7 6.8 - 8.8 g/dL    Alkaline Phosphatase 44 40 - 150 U/L    ALT 21 0 - 50 U/L    AST 19 0 - 45 U/L               Follow-ups after your visit        Your next 10 appointments already scheduled     Sep 19, 2017  2:00 PM CDT   NEW WITH ROOM with Lianne Saavedra GC,  2 118 CONSULT RM   [facility] ([facility])    [address] [place] [address] [phone]            Sep 19, 2017  3:45 PM CDT   [facility] Draw with [facility] LAB DRAW   [facility] ([facility])    [address] [place] [id number] [phone]            Oct 10, 2017 12:30 PM CDT   [facility] Lab Draw with  [facility] LAB DRAW   [facility] Draw ([facility])    [address] [address]    [phone]            Oct 10, 2017  1:00 PM CDT   (Arrive by 12:45 PM)   Return Visit with SEMAJ Walls   [facility] ([facility])    [address] [place] [id number] [phone]            Oct 10, 2017  2:00 PM CDT   Infusion 60 with UC ONCOLOGY INFUSION, UC 15 ATC   [facility] ([facility])    [address] [place] [id number] [phone]            Oct 31, 2017  1:30 PM CDT   [facility] Lab Draw with UC [facility] LAB DRAW   [facility] Lab Draw ([facility])    [address] [place] [id number] [phone]            Oct 31, 2017  2:00 PM CDT   (Arrive by 1:45 PM)   Return Visit with Lisandro Aguiar MD   [facility] ([facility])    [address] [place] [id number] [phone]            Oct 31, 2017  3:00 PM CDT   Infusion 60 with UC ONCOLOGY INFUSION   [facility] ([facility])    [address] [place] [id number] [phone]              Who to contact     If you have questions or need follow up information about today's clinic visit or your schedule please contact [facility] directly at [phone].  Normal or non-critical lab and imaging results will be communicated to you by MyChart, letter or phone within 4 business days after the clinic has received the results. If you do not hear from us within 7 days, please contact the clinic through MyChart or phone. If you have a critical or abnormal lab result, we will notify you by phone as soon as possible.  Submit refill requests through "[employer] or call your pharmacy and they will forward the refill request to us. Please allow 3 business days for your refill to be completed.           Additional Information About Your Visit        [employer] Information     [employer] gives you secure access to your electronic health record. If you see a primary care provider, you can also send messages to your care team and make appointments. If you have questions, please call your primary care clinic.  If you do not have a primary care provider, please call [phone] and they will assist you.        Care EveryWhere ID     This is your Care EveryWhere ID. This could be used by other organizations to access your [facility] medical records  [id number]        Your Vitals Were     Pulse Temperature Respirations Pulse Oximetry BMI (Body Mass Index)       84 98.2  F (36.8  C) (Oral) 18 97% 28.87 kg/m2        Blood Pressure from Last 3 Encounters:   09/19/17 119/63   09/15/17 115/75   09/14/17 144/82    Weight from Last 3 Encounters:   09/19/17 77.7 kg (171 lb 3.2 oz)   09/15/17 77 kg (169 lb 12.1 oz)   09/14/17 77.8 kg (171 lb 8 oz)              We Performed the Following     Ca27.29  breast tumor marker     CBC with platelets differential     CEA     Comprehensive metabolic panel          Today's Medication Changes          These changes are accurate as of: 9/19/17 10:57 AM.  If you have any questions, ask your nurse or doctor.               Start taking these medicines.        Dose/Directions    prochlorperazine 10 MG tablet   Commonly known as:  COMPAZINE   Used for:  Malignant neoplasm of left female breast, unspecified estrogen receptor status, unspecified site of breast (H)        Dose:  10 mg   Take 1 tablet (10 mg) by mouth every 6 hours as needed (Nausea/Vomiting)   Quantity:  30 tablet   Refills:  2            Where to get your medicines      These medications were sent to [facility] [place], [place] - [address]  [address] [address]    Hours:  TRANSPLANT PHONE NUMBER [phone] Phone:  [phone]     prochlorperazine 10 MG tablet                 Primary Care Provider    Physician No Ref-Primary       No address on file        Equal Access to Services     RICHARDTONEY TAYLOR : Hadii aad ku malini Ramsey, waspencerda lujoann, jamal singh conchapretty, waxrd boyd jesusnba kernsarah piperwillisana paula beltran. So [facility] [phone].    ATENCIÓN: Si habla español, tiene a lagunas disposición servicios gratuitos de asistencia lingüística. Llame al [phone].    We comply with applicable federal civil rights laws and [place] laws. We do not discriminate on the basis of race, color, national origin, age, disability sex, sexual orientation or gender identity.            Thank you!     Thank you for choosing [facility]  for your care. Our goal is always to provide you with excellent care. Hearing back from our patients is one way we can continue to improve our services. Please take a few minutes to complete the written survey that you may receive in the mail after your visit with us. Thank you!             Your Updated Medication List - Protect others around you: Learn how to safely use, store and throw away your medicines at www.disposemymeds.org.          This list is accurate as of: 9/19/17 10:57 AM.  Always use your most recent med list.                   Brand Name Dispense Instructions for use Diagnosis    order for DME     1 each    L UE class 2 compression sleeve and gauntlet Night garment Bandaging supplies    Lymphedema of left upper extremity       prochlorperazine 10 MG tablet    COMPAZINE    30 tablet    Take 1 tablet (10 mg) by mouth every 6 hours as needed (Nausea/Vomiting)    Malignant neoplasm of left female breast, unspecified estrogen receptor status, unspecified site of breast (H)       tamoxifen 20 MG tablet    NOLVADEX    90 tablet    Take 1 tablet (20 mg) by mouth daily    Cancer of central portion of left breast (H)       Vitamin D3 03254 UNITS Tabs

## 2017-09-19 NOTE — LETTER
9/19/2017       RE: Hoda Brush  [address] [facility] [address]     Dear Colleague,    Thank you for referring your patient, Hoda Brush, to the [facility]. Please see a copy of my visit note below.    9/19/2017    Referring Provider: Lisandro Aguiar MD    Presenting Information:   I met with Hoda Brush today, with the aid of a Malaysian , for genetic counseling at the Cancer Risk Management Program at the [facility] to discuss her personal and family history of breast cancer. She is here today to review this history, cancer screening recommendations, and available genetic testing options.    Personal History:  Hoda is a 62 year old female. She was diagnosed with invasive ductal carcinoma of the left breast at age 59; the tumor is ER/SD/Her2+. Treatment has included a mastectomy, chemotherapy, and Tamoxifen.       She had her first menstrual period at age 16, her first child at age 22, and is postmenopausal. Hoda has her ovaries, fallopian tubes and uterus in place. She reports that she was having regular transvaginal ultrasounds and her last exam in March 2017 was normal. She reports that she has never used hormone replacement therapy.      Her most recent OB-GYN exam and Pap smear in August 2017 were normal. She has not had a colonoscopy. She does not regularly do any other cancer screening at this time. Hoda reported no tobacco or alcohol use.    Family History: (Please see scanned pedigree for detailed family history information)    One sister is 66 and was diagnosed with breast cancer at age 62; treatment included a lumpectomy and radiation. She and her daughter have both pursued genetic testing, which was negative. Further details are unavailable.    Hoda's mother is 87 and was diagnosed with breast cancer at age 45; treatment included a mastectomy and chemotherapy. She was then diagnosed with a new breast cancer at age 70;  treatment included a mastectomy. She reportedly also pursued genetic testing, which was negative.    Hoda's father was diagnosed with lung cancer and passed away at age 67; he had a history of smoking.    Limited information is available regarding extended maternal and paternal relatives.    Her maternal and paternal ethnicity is Moroccan and Ashkenazi Gnosticist. There is no reported consanguinity.    Discussion:    Hoda's personal and family history of breast cancer is suggestive of a hereditary cancer syndrome.    We reviewed the features of sporadic, familial, and hereditary cancers. In looking at Hoda's family history, it is possible that a cancer susceptibility gene is present as she, her sister, and her mother were also diagnosed with breast cancer. Her mother was also diagnosed with her first cancer under age 50, as well as a second new breast cancer. The limited information available regarding extended relatives does otherwise limit a complete risk assessment.    We discussed the natural history and genetics of several hereditary cancer syndromes, including Hereditary Breast and Ovarian Cancer (HBOC) syndrome. A detailed handout regarding these syndromes and the information we discussed was provided to Hoda at the end of our appointment today and can be found in the after visit summary. Topics included: inheritance pattern, cancer risks, cancer screening recommendations, and also risks, benefits and limitations of testing.    We reviewed that the most common cause of hereditary breast cancer is HBOC syndrome, which is caused by mutations in the BRCA1 and BRCA2 genes. Individuals with HBOC syndrome are at increased risk for several different cancers, including breast, ovarian, male breast, prostate, melanoma, and pancreatic cancer.  We discussed that there are three common mutations in the BRCA genes that are common in the Ashkenazi Gnosticist population. About 1 in 40 individuals of Ashkenazi Gnosticist  background have one of these three mutations, which account for about 90% of the BRCA mutations in this population.     Based on her personal and family history, Hoda meets current National Comprehensive Cancer Network (NCCN) criteria for genetic testing of the BRCA1 and BRCA2 genes.      We discussed that it is not currently clear what genetic testing her relatives pursued, if it included the BRCA1 and BRCA2 genes, or how this affects Hoda. Though her sister's and mother's reportedly negative genetic testing may reduce the chance Hoda carries an identifiable gene mutation, it does not eliminate the chance. There remains the possibility that Hoda has a separate genetic cause for her cancer, potentially inherited from her father.    As such, we discussed that there are also other additional genes that could cause increased risk for breast cancer beyond the BRCA1 and BRCA2 genes. As many of these genes present with overlapping features in a family, it would be reasonable for Hoda to consider panel genetic testing to analyze multiple genes at once.  We reviewed genetic testing options for hereditary breast cancer: guidelines-based high and moderate risk panel testing (Breast Actionable Panel, 11 genes) and expanded high and moderate risk panel testing (Breast Expanded Panel, 18 genes). Hoda expressed interest in only the most high risk and actionable genes. She opted for the Breast Actionable Panel.  Genetic testing is available for 11 genes associated with high or moderate risk for breast cancer: Breast Actionable Panel (DEWEY, BRCA1, BRCA2, CDH1, CHEK2, NBN, NF1, PALB2, STK11,  PTEN, and TP53).  We discussed that some of the genes in the Breast Actionable Panel are associated with specific hereditary cancer syndromes and published management guidelines: HBOC syndrome (BRCA1, BRCA2), Hereditary Diffuse Gastric Cancer (CDH1), Peutz-Jeghers syndrome (STK11), Neurofibromatosis type 1 (NF1), Cowden syndrome  (PTEN), and Li Fraumeni syndrome (TP53).    The remaining genes (DEWEY, CHEK2, NBN, and PALB2) are associated with moderate breast cancer risk and have published screening guidelines.  Hoda was provided with a brochure from the Cancer Risk Management Program explaining the Breast Actionable Panel testing. This information can be seen in the patient instructions section.  With the help of the , consent was obtained and genetic testing for the Breast Actionable Panel was sent to the Molecular Diagnostics Laboratory at the [facility]. Turn around time: 4-6 weeks after insurance pre-authorization is obtained.    Medical Management: For Hoda, we reviewed that the information gathered from genetic testing may determine:     additional cancer screening for which she may qualify (i.e. more frequent colonoscopies, annual dermatologic exams, etc.),     options for risk reducing surgeries (i.e. surgery to remove her ovaries and/or uterus, etc.),      and targeted chemotherapies to treat Hoda's active cancer, or if she develops certain cancers in the future (i.e. BRCA-specific chemotherapies, etc.).     These recommendations and possible targeted chemotherapies will be discussed in detail once genetic testing is completed.     Plan:  1) Today Hoda elected to proceed with genetic testing using the Breast Actionable Panel offered by the [facility].  2) This information should be available in 4-6 weeks after insurance pre-authorization is obtained.  3) Hoda will return to clinic to discuss the results.    Face to face time: 45 minutes    Lianne Saavedra MS, [facility]  Certified Genetic Counselor  Office: [phone]  Pager: [phone]

## 2017-09-19 NOTE — PATIENT INSTRUCTIONS
Breast Actionable Panel  The Breast Actionable cancer panel is a genetic test which analyzes 11 genes known to be associated with an increased lifetime risk of breast and other cancers. Published medical guidelines exist for detection, prevention, risk reduction, and/or treatment for individuals with mutations in these genes.      Assessing Cancer Risk  Only about 5-10% of cancers are thought to be due to an inherited cancer susceptibility gene.    These families often have:    Several people with the same or related types of cancer    Cancers diagnosed at a young age (before age 50)    Individuals with more than one primary cancer    Multiple generations of the family affected with cancer    Some people may be candidates for genetic testing of more than one gene.  For these families, genetic testing using a multi-gene cancer panel may be offered.  These panels may test genes known to increase the risk for breast (and other) cancers: DEWEY, BRCA1, BRCA2, CDH1, CHEK2, PALB2, PTEN, and TP53.  The purpose of this handout is to serve as a brief summary of the high/moderate-risk breast cancer genes which have published clinical management guidelines for individuals who are found to carry a mutation.    BRCA1 and BRCA2-Hereditary Breast and Ovarian Cancer Syndrome (HBOC)  A single mutation in one of the copies of BRCA1 or BRCA2 increases the risk for breast and ovarian cancer, among others.  The risk for pancreatic cancer and melanoma may also be slightly increased in some families.  The tables below list the chance that someone with a BRCA mutation would develop cancer in his or her lifetime1,2,3,4.          Women   Men     General Population BRCA+    General Population BRCA+   Breast  12% 40-80%  Breast <1% ~7%   Ovarian  1-2% 10-40%  Prostate 16% 20%           A person s ethnic background is also important to consider, as individuals of Ashkenazi [place] ancestry have a higher chance of having a BRCA gene  mutation.  There are three BRCA mutations that occur more frequently in this population.     Individuals who inherit two BRCA2 mutations--one from their mother and one from their father--have a condition called Fanconi Anemia.  This rare autosomal recessive condition is associated with short stature, developmental delay, bone marrow failure, and increased risk for childhood cancers.    TP53-Li-Fraumeni Syndrome (LFS)  LFS is a cancer predisposition syndrome. Individuals with LFS are at an increased risk for developing cancer at a young age. The general lifetime risk for development of cancer is 50% by age 30 and 90% by age 60.  LFS is caused by a mutation in the TP53 gene.  A single mutation in one of the copies of TP53 increases the risk for multiple cancers.     Core Cancers: Sarcomas, Breast, Brain, Lung, Leukemias/Lymphomas, Adrenocortical carcinomas  Other Cancers: Gastrointestinal, Thyroid, Skin, Genitourinary    PTEN-Cowden Syndrome  Cowden syndrome is a hereditary condition that increases the risk for breast, thyroid, endometrial, and kidney cancer.  Cowden syndrome is caused by a mutation in the PTEN gene.  A single mutation in one of the copies of PTEN causes Cowden syndrome and increases cancer risk.  The table below shows the chance that someone with a PTEN mutation would develop cancer in their lifetime5,6.  Other benign features seen in some individuals with Cowden syndrome include benign skin lesions (facial papules, keratoses, lipomas), learning disability, autism, thyroid nodules, colon polyps, and larger head size.      Lifetime Cancer Risk   Cancer Type General Population Cowden Syndrome   Breast  12% 25-50%*   Thyroid  1% 35%   Renal 1-2% 35%   Endometrial  2-3% 28%   Colon 5% 9%   Melanoma 2-3% >5%     *One recent study found breast cancer risk to be increased to 85%                CDH1-Hereditary Diffuse Gastric Cancer (HDGC)  Currently, one gene is known to cause hereditary diffuse gastric  cancer: CDH1.  Individuals with HDGC are at increased risk for diffuse gastric cancer and lobular breast cancer. Of people diagnosed with HDGC, 30-50% have a mutation in the CDH1 gene.  This suggests there are likely other genes that may cause HDGC that have not been identified yet.      Lifetime Cancer Risks    General Pop HDGC    Diffuse Gastric  <1% ~80%   Breast 12% 39-52%       Additional Genes Associated with Increased Breast Cancer Risk  PALB2  Mutations in PALB2 have been shown to increase the risk of breast cancer up to 33-58% in some families; where individuals fall within this risk range is dependent upon family history.9 PALB2 mutations have also been associated with increased risk for pancreatic cancer, although this risk has not been quantified yet.  Individuals who inherit two PALB2 mutations--one from their mother and one from their father--have a condition called Fanconi Anemia.  This rare autosomal recessive condition is associated with short stature, developmental delay, bone marrow failure, and increased risk for childhood cancers.    DEWEY  DEWEY is a moderate-risk breast cancer gene. Women who have a mutation in DEWEY can have between a 2-4 fold increased risk for breast cancer compared to the general population.10  DEWEY mutations have also been associated with increased risk for pancreatic cancer, however an estimate of this cancer risk is not well understood.11  Individuals who inherit two DEWEY mutations have a condition called ataxia-telangiectasia (AT).  This rare autosomal recessive condition affects the nervous system and immune system, and is associated with progressive cerebellar ataxia beginning in childhood.  Individuals with ataxia-telangiectasia often have a weakened immune system and have an increased risk for childhood cancers.         CHEK2   CHEK2 is a moderate-risk breast cancer gene.  Women who have a mutation in CHEK2 have around a 2-fold increased risk for breast cancer compared to  the general population, and this risk may be higher depending upon family history.12,13,14 Mutations in CHEK2 have also been shown to increase the risk of a number of other cancers, including colon and prostate, however these cancer risks are currently not well understood.      Note that the table at the end of the handout includes additional moderate breast cancer genes.         Inheritance   All of the genes reviewed above are inherited in an autosomal dominant pattern.  This means that if a parent has a mutation, each of his or her children will have a 50% chance of inheriting that same mutation.  Therefore, each child--male or female--would have a 50% chance of being at increased risk for developing cancer.        Image obtained from Genetics Home Reference, 2013     In rare cases, there can be mutations in both copies of these genes (one from each parent), leading to different autosomal recessive conditions.  Mutations in some genes can occur de vidhi, which means that a person s mutation occurred for the first time in them and was not inherited from a parent.  Now that they have the mutation, however, it can be passed on to future generations.     Genetic Testing  Genetic testing involves a blood test and will look for any harmful mutations within genes that are associated with increased cancer risk.  If possible, it is recommended that the person(s) who has had cancer be tested before other family members.  That person will give us the most useful information about whether or not a specific gene is associated with the cancer in the family.    Results  There are three possible results of genetic testing:    Positive--a harmful mutation was identified in one or more of the genes    Negative--no mutation was identified in any of the genes on this panel    Variant of unknown significance--a variation in one of the genes was identified, but it is unclear how this impacts cancer risk in the family      Advantages  and Disadvantages  There are advantages and disadvantages to genetic testing.  Advantages    May clarify your cancer risk    Can help you make medical decisions    May explain the cancers in your family    May give useful information to your family members (if you share your results)    Disadvantages    Possible negative emotional impact of learning about inherited cancer risk    Uncertainty in interpreting a negative test result in some situations    Possible genetic discrimination concerns (see below)    Genetic Information Nondiscrimination Act (MARIELLA)  MARIELLA is a federal law that protects individuals from health insurance or employment discrimination based on a genetic test result alone.  Although rare, there are currently no legal discrimination protections in terms of life insurance, long term care, or disability insurances.  Visit the National Human Genome Research [place] genome.gov/17371665 to learn more.    Reducing Cancer Risk  Each of the genes listed within this handout have nationally recognized cancer screening guidelines that would be recommended for individuals who test positive.  In addition to increased cancer screening, surgeries may be offered or recommended to reduce cancer risk.  Recommendations are based upon an individual s genetic test result as well as their personal and family history of cancer.    Questions to Think About Regarding Genetic Testing    What effect will the test result have on me and my relationship with my family members if I have an inherited gene mutation?  If I don t have a gene mutation?    Should I share my test results, and how will my family react to this news, which may also affect them?    Are my children ready to learn new information that may one day affect their own health?  Resources  FORCE: Facing Our Risk of Cancer Empowered facingourrisk.org   Bright Pink bebrightpink.org   Li-Fraumeni Syndrome Association lfsassociation.org   PTEN World PTENworld.[employer] Media   No  stomach for cancer, Inc. nostomacforcancer.org   Stomach cancer relief network scrnet.org   Collaborative Group of the Americas on Inherited Colorectal Cancer (CGA) cgaicc.com    Cancer Care cancercare.org   American Cancer Society (ACS) cancer.org   National Cancer [place] (NCI) cancer.gov     Cancer Risk Management Program [phone]-CANCER ([phone])  ? Azeb Thomas, MS, [facility]  [phone]  ? Tanya Barton, MS, [facility]  [phone]  ? Lianne Saavedra, MS, [facility]  [phone]  ? Leonora Mcneil, MS, [facility]  [phone]  ? Jarrett Bowman, MS, [facility]  [phone]    References  1. Faby QUIROZ, Ethel PDP, Jose S, Lillian COON, Rowdy JE, Slime JL, Bhargav N, Heath H, Bello O, Darya A, Flores B, Ananya P, Georgi S, Frank DM, Prem N, Cliff E, Chloe H, Hernando E, Suzie J, Ludwin J, Meli B, Tulinius H, Thorlacius S, Eerola H, Ignacio H, Suly K, Harinder OP. Average risks of breast and ovarian cancer associated with BRCA1 or BRCA2 mutations detected in case series unselected for family history: a combined analysis of 222 studies. Am J Hum Tiffany. 2003;72:1117-30.  2. Alicia N, Yovana M, Cunha G.  BRCA1 and BRCA2 Hereditary Breast and Ovarian Cancer. Gene Reviews online. 2013.  3. Nelson YC, Yadi S, Harshal G, Soriano S. Breast cancer risk among male BRCA1 and BRCA2 mutation carriers. J Natl Cancer Inst. 2007;99:1811-4.  4. Vidal LINDSAY, Marcos I, Cristino J, Kareen E, Les ER, Alysia F. Risk of breast cancer in male BRCA2 carriers. J Med Tiffany. 2010;47:710-1.  5. Dionisio MH, Shandra J, Hank J, Igor LARIOS, J Luis MS, Eng C. Lifetime cancer risks in individuals with germline PTEN mutations. Clin Cancer Res. 2012;18:400-7.  6. Sara HAMMOND. Cowden Syndrome: A Critical Review of the Clinical Literature. J Tiffany . 2009:18:13-27.  7. National Comprehensive Cancer Network. Clinical practice guidelines in oncology, colorectal cancer screening. Available online (registration required). 2013.  8. National  Cancer [place]. SEER Cancer Stat Fact Sheets.  December 2013.  9. Faby PENALOZA, et al. Breast-Cancer Risk in Families with Mutations in PALB2. NEJM. 2014; 371(6):497-506.  10. Madina QUIROZ, Arnol KELLER, Houston S, Kadie P, Caren T, Hernan M, Robbie B, Mike H, Letitia R, Elaine K, Jenny L, Vidal DG, Frank D, Pete DF, Saleem MR, The Breast Cancer Susceptibility Collaboration (UK) & Hero N. DEWEY mutations that cause ataxia-telangiectasia are breast cancer susceptibility alleles. Nature Genetics. 2006;38:873-875  11. Kirk N , Sam Y, Andreina J, Sandee L, Neo GM , Devi ML, Parminder S, Nails AG, Syngal S, Nicolasa ML, Khris J , Patsy R, King SZ, Vivi JR, Lolly VE, Omid M, Vogelstein B, Terell N, Nir RH, Jeanette KW, and Heller AP. DEWEY mutations in patients with hereditary pancreatic cancer. Cancer Discover. 2012;2:41-46  12. CHEK2 Breast Cancer Case-Control Consortium. CHEK2*1100delC and susceptibility to breast cancer: A collaborative analysis involving 10,860 breast cancer cases and 9,065 controls from 10 studies. Am J Hum Tiffany, 74 (2004), pp. 5365-3935  13. August T, Musa S, Fredis K, et al. Spectrum of Mutations in BRCA1, BRCA2, CHEK2, and TP53 in Families at High Risk of Breast Cancer. ARIADNA, 2006;295(12):5366-0621.   14. Michael C, Marclel D, Latrice A, et al. Risk of breast cancer in women with a CHEK2 mutation with and without a family history of breast cancer. JClin Oncol. 2011;29:7212-6094.    Breast Actionable Panel      GENES (11) ASSOCIATED CANCER(S) ASSOCIATED CANCER SYNDROME(S)   DEWEY breast, pancreas    BRCA1 breast, ovary, pancreas, melanoma, prostate, male breast Hereditary breast /ovarian cancer syndrome (HBOC)   BRCA2 breast, ovary, pancreas, melanoma, prostate, male breast Hereditary breast /ovarian cancer syndrome (HBOC)   CDH1 breast, stomach, colon Hereditary diffuse gastric cancer   CHEK2 breast, colon, prostate    NBN breast, ovary, prostate     NF1 breast, brain, peripheral nervous system Neurofibromatosis 1   PALB2 breast, pancreas    PTEN breast, thyroid, uterus, colon, kidney Cowden syndrome, Nrtcenev-Ezvux-Mgxsokkra syndrome (BRRS), PTEN-related Proteus syndrome (PS), Proteus-like syndrome   STK11 breast, ovary, colon, pancreas, stomach, uterus, cervix Peutz-Jeghers syndrome   TP53 breast, bone, brain, soft tissues, adrenal cortex Li-Fraumeni syndrome         Turnaround Time  4 - 6 weeks    insurance coverage   Testing will be held until prior authorization is obtained. You will be contacted once prior authorization is completed.   About the [facility] ([facility]), [facility]  In collaborative effort with the [facility] and the [place] [facility], the MDL offers a full range of diagnostic testing services, with a strong focus on quality, accuracy, and timeliness.

## 2017-09-19 NOTE — PROGRESS NOTES
Infusion Nursing Note:  Pt presents today for C1 D1 Loading Herceptin (has had a couple years ago at OSH) & Xgeva     present during visit today: Yes, Language: Guamanian.   Patient seen by WALLY Aguiar on 9/14.     Patient new to oncology infusion room and receiving Xgeva for the first time (has had zometa). Pt oriented to infusion room, location of bathrooms, nutrition stations, and call light. New patient teaching done previously. Pt received new pt folder prior to coming to infusion. Writer reinforced teaching in infusion. All medications and side effects reviewed with patient.     Lab Results   Component Value Date    HGB 11.9 09/19/2017     Lab Results   Component Value Date    WBC 6.9 09/19/2017      Lab Results   Component Value Date    ANEU 3.6 09/19/2017     Lab Results   Component Value Date     09/19/2017      Lab Results   Component Value Date     09/19/2017                   Lab Results   Component Value Date    POTASSIUM 4.0 09/19/2017           No results found for: MAG         Lab Results   Component Value Date    CR 0.76 09/19/2017                   Lab Results   Component Value Date    TAYLER 8.5 09/19/2017                Lab Results   Component Value Date    BILITOTAL 0.5 09/19/2017           Lab Results   Component Value Date    ALBUMIN 3.2 09/19/2017                    Lab Results   Component Value Date    ALT 21 09/19/2017           Lab Results   Component Value Date    AST 19 09/19/2017     ECHO done 8/29/2017: EF 60-65%    Results reviewed, labs MET treatment parameters.    Note: Pt denies any changes or new issues since seeing Dr. Aguiar on 9/14. Has received multiple doses for Herceptin in the past. Pt states last dose was about 2 yrs ago. Denies having any issues/reaction from Herceptin in the past. Has multiple premeds ordered since it is a Perjeta/Herceptin/Taxol plan and the Perjeta/Taxol have been removed. Dr. Aguiar called to clarify.    TORB 9/19/2017 @ 0908   Stefania/Cahs Bev, RN  --Pt is suppose to receive only Herceptin  --Give all premeds   --If tolerated well today at least the Decadron can be removed from plan    Writer discussed above plan with pt and she verbalized understanding. She did not drive today.     Proceed with treatment.    Intravenous Access:  Implanted Port.    Post Infusion Assessment:  Patient tolerated infusion/injection without incident.  Blood return noted pre and post infusion.  Port flushed and dc'd intact at end of infusion.    Discharge Plan:   Prescription refills given for compazine.  Discharge instructions reviewed with: Patient.  Patient and/or family verbalized understanding of discharge instructions and all questions answered.  Copy of AVS reviewed with patient and/or family.  Pt will return 10/10 for next provider and infusion appts.      Patient instructed to call triage with questions/concerns or if they have chills and/or temperature greater than or equal to 100.5.  Triage number: [phone]. After hours, weekends, or holidays, call main hospital  at  [phone] and ask for Oncology doctor on call. Pt stated understanding of plan.

## 2017-09-20 ENCOUNTER — APPOINTMENT (OUTPATIENT)
Dept: LAB | Facility: CLINIC | Age: 62
End: 2017-09-20
Attending: COLON & RECTAL SURGERY
Payer: COMMERCIAL

## 2017-09-20 ENCOUNTER — NURSE TRIAGE (OUTPATIENT)
Dept: NURSING | Facility: CLINIC | Age: 62
End: 2017-09-20

## 2017-09-20 ENCOUNTER — OFFICE VISIT (OUTPATIENT)
Dept: URGENT CARE | Facility: URGENT CARE | Age: 62
End: 2017-09-20
Payer: COMMERCIAL

## 2017-09-20 ENCOUNTER — APPOINTMENT (OUTPATIENT)
Dept: ULTRASOUND IMAGING | Facility: CLINIC | Age: 62
End: 2017-09-20
Attending: EMERGENCY MEDICINE
Payer: COMMERCIAL

## 2017-09-20 ENCOUNTER — TELEPHONE (OUTPATIENT)
Dept: ONCOLOGY | Facility: CLINIC | Age: 62
End: 2017-09-20

## 2017-09-20 ENCOUNTER — HOSPITAL ENCOUNTER (OUTPATIENT)
Facility: CLINIC | Age: 62
Setting detail: OBSERVATION
Discharge: HOME | End: 2017-09-21
Attending: EMERGENCY MEDICINE | Admitting: INTERNAL MEDICINE
Payer: COMMERCIAL

## 2017-09-20 VITALS
BODY MASS INDEX: 29.06 KG/M2 | HEART RATE: 90 BPM | WEIGHT: 172.3 LBS | OXYGEN SATURATION: 98 % | DIASTOLIC BLOOD PRESSURE: 80 MMHG | TEMPERATURE: 98.3 F | SYSTOLIC BLOOD PRESSURE: 100 MMHG

## 2017-09-20 DIAGNOSIS — C79.51 BREAST CANCER METASTASIZED TO BONE, LEFT (H): ICD-10-CM

## 2017-09-20 DIAGNOSIS — I89.0 LYMPHEDEMA OF LEFT UPPER EXTREMITY: Primary | ICD-10-CM

## 2017-09-20 DIAGNOSIS — C50.912 MALIGNANT NEOPLASM OF LEFT FEMALE BREAST, UNSPECIFIED ESTROGEN RECEPTOR STATUS, UNSPECIFIED SITE OF BREAST (H): ICD-10-CM

## 2017-09-20 DIAGNOSIS — L03.114 CELLULITIS OF LEFT UPPER EXTREMITY: ICD-10-CM

## 2017-09-20 DIAGNOSIS — Z85.3 PERSONAL HISTORY OF MALIGNANT NEOPLASM OF BREAST: ICD-10-CM

## 2017-09-20 DIAGNOSIS — C50.912 BREAST CANCER METASTASIZED TO BONE, LEFT (H): ICD-10-CM

## 2017-09-20 LAB
ANION GAP SERPL CALCULATED.3IONS-SCNC: 5 MMOL/L (ref 3–14)
BASOPHILS # BLD AUTO: 0 10E9/L (ref 0–0.2)
BASOPHILS NFR BLD AUTO: 0.1 %
BUN SERPL-MCNC: 21 MG/DL (ref 7–30)
CALCIUM SERPL-MCNC: 8.1 MG/DL (ref 8.5–10.1)
CHLORIDE SERPL-SCNC: 109 MMOL/L (ref 94–109)
CO2 SERPL-SCNC: 26 MMOL/L (ref 20–32)
CREAT SERPL-MCNC: 0.84 MG/DL (ref 0.52–1.04)
CRP SERPL-MCNC: 5.4 MG/L (ref 0–8)
DIFFERENTIAL METHOD BLD: ABNORMAL
EOSINOPHIL # BLD AUTO: 0.1 10E9/L (ref 0–0.7)
EOSINOPHIL NFR BLD AUTO: 0.3 %
ERYTHROCYTE [DISTWIDTH] IN BLOOD BY AUTOMATED COUNT: 13.5 % (ref 10–15)
ERYTHROCYTE [SEDIMENTATION RATE] IN BLOOD BY WESTERGREN METHOD: 19 MM/H (ref 0–30)
GFR SERPL CREATININE-BSD FRML MDRD: 69 ML/MIN/1.7M2
GLUCOSE SERPL-MCNC: 93 MG/DL (ref 70–99)
HCT VFR BLD AUTO: 33.8 % (ref 35–47)
HGB BLD-MCNC: 11.1 G/DL (ref 11.7–15.7)
IMM GRANULOCYTES # BLD: 0.1 10E9/L (ref 0–0.4)
IMM GRANULOCYTES NFR BLD: 0.2 %
LYMPHOCYTES # BLD AUTO: 3.3 10E9/L (ref 0.8–5.3)
LYMPHOCYTES NFR BLD AUTO: 16.6 %
MCH RBC QN AUTO: 30.4 PG (ref 26.5–33)
MCHC RBC AUTO-ENTMCNC: 32.8 G/DL (ref 31.5–36.5)
MCV RBC AUTO: 93 FL (ref 78–100)
MONOCYTES # BLD AUTO: 1.2 10E9/L (ref 0–1.3)
MONOCYTES NFR BLD AUTO: 5.8 %
NEUTROPHILS # BLD AUTO: 15.5 10E9/L (ref 1.6–8.3)
NEUTROPHILS NFR BLD AUTO: 77 %
NRBC # BLD AUTO: 0 10*3/UL
NRBC BLD AUTO-RTO: 0 /100
PLATELET # BLD AUTO: 207 10E9/L (ref 150–450)
POTASSIUM SERPL-SCNC: 4 MMOL/L (ref 3.4–5.3)
RBC # BLD AUTO: 3.65 10E12/L (ref 3.8–5.2)
SODIUM SERPL-SCNC: 140 MMOL/L (ref 133–144)
WBC # BLD AUTO: 20.1 10E9/L (ref 4–11)

## 2017-09-20 PROCEDURE — 99285 EMERGENCY DEPT VISIT HI MDM: CPT | Mod: 25

## 2017-09-20 PROCEDURE — 99219 ZZC INITIAL OBSERVATION CARE,LEVL II: CPT | Performed by: INTERNAL MEDICINE

## 2017-09-20 PROCEDURE — 86140 C-REACTIVE PROTEIN: CPT | Performed by: EMERGENCY MEDICINE

## 2017-09-20 PROCEDURE — 99207 ZZC OFFICE-HOSPITAL ADMIT: CPT | Performed by: PHYSICIAN ASSISTANT

## 2017-09-20 PROCEDURE — 96361 HYDRATE IV INFUSION ADD-ON: CPT

## 2017-09-20 PROCEDURE — 85025 COMPLETE CBC W/AUTO DIFF WBC: CPT | Performed by: EMERGENCY MEDICINE

## 2017-09-20 PROCEDURE — 93971 EXTREMITY STUDY: CPT | Mod: LT

## 2017-09-20 PROCEDURE — 80048 BASIC METABOLIC PNL TOTAL CA: CPT | Performed by: EMERGENCY MEDICINE

## 2017-09-20 PROCEDURE — G0378 HOSPITAL OBSERVATION PER HR: HCPCS

## 2017-09-20 PROCEDURE — 25000128 H RX IP 250 OP 636: Performed by: EMERGENCY MEDICINE

## 2017-09-20 PROCEDURE — 96365 THER/PROPH/DIAG IV INF INIT: CPT

## 2017-09-20 PROCEDURE — 85652 RBC SED RATE AUTOMATED: CPT | Performed by: EMERGENCY MEDICINE

## 2017-09-20 RX ORDER — HEPARIN SODIUM,PORCINE 10 UNIT/ML
5-10 VIAL (ML) INTRAVENOUS EVERY 24 HOURS
Status: DISCONTINUED | OUTPATIENT
Start: 2017-09-20 | End: 2017-09-21 | Stop reason: HOSPADM

## 2017-09-20 RX ORDER — HEPARIN SODIUM (PORCINE) LOCK FLUSH IV SOLN 100 UNIT/ML 100 UNIT/ML
5 SOLUTION INTRAVENOUS
Status: DISCONTINUED | OUTPATIENT
Start: 2017-09-20 | End: 2017-09-21 | Stop reason: HOSPADM

## 2017-09-20 RX ORDER — AMOXICILLIN 250 MG
1-2 CAPSULE ORAL 2 TIMES DAILY PRN
Status: DISCONTINUED | OUTPATIENT
Start: 2017-09-20 | End: 2017-09-21 | Stop reason: HOSPADM

## 2017-09-20 RX ORDER — TAMOXIFEN CITRATE 10 MG/1
20 TABLET ORAL DAILY
Status: DISCONTINUED | OUTPATIENT
Start: 2017-09-21 | End: 2017-09-20

## 2017-09-20 RX ORDER — HEPARIN SODIUM,PORCINE 10 UNIT/ML
5-10 VIAL (ML) INTRAVENOUS
Status: DISCONTINUED | OUTPATIENT
Start: 2017-09-20 | End: 2017-09-21 | Stop reason: HOSPADM

## 2017-09-20 RX ORDER — PROCHLORPERAZINE MALEATE 5 MG
10 TABLET ORAL EVERY 6 HOURS PRN
Status: DISCONTINUED | OUTPATIENT
Start: 2017-09-20 | End: 2017-09-21 | Stop reason: HOSPADM

## 2017-09-20 RX ORDER — SODIUM CHLORIDE 9 MG/ML
1000 INJECTION, SOLUTION INTRAVENOUS CONTINUOUS
Status: DISCONTINUED | OUTPATIENT
Start: 2017-09-20 | End: 2017-09-20

## 2017-09-20 RX ORDER — ACETAMINOPHEN 325 MG/1
650 TABLET ORAL EVERY 4 HOURS PRN
Status: DISCONTINUED | OUTPATIENT
Start: 2017-09-20 | End: 2017-09-21 | Stop reason: HOSPADM

## 2017-09-20 RX ORDER — CEFAZOLIN SODIUM 1 G/3ML
1 INJECTION, POWDER, FOR SOLUTION INTRAMUSCULAR; INTRAVENOUS EVERY 8 HOURS
Status: DISCONTINUED | OUTPATIENT
Start: 2017-09-21 | End: 2017-09-21

## 2017-09-20 RX ORDER — NALOXONE HYDROCHLORIDE 0.4 MG/ML
.1-.4 INJECTION, SOLUTION INTRAMUSCULAR; INTRAVENOUS; SUBCUTANEOUS
Status: DISCONTINUED | OUTPATIENT
Start: 2017-09-20 | End: 2017-09-21 | Stop reason: HOSPADM

## 2017-09-20 RX ORDER — CEFAZOLIN SODIUM 1 G/3ML
1 INJECTION, POWDER, FOR SOLUTION INTRAMUSCULAR; INTRAVENOUS ONCE
Status: COMPLETED | OUTPATIENT
Start: 2017-09-20 | End: 2017-09-20

## 2017-09-20 RX ORDER — TAMOXIFEN CITRATE 10 MG/1
20 TABLET ORAL EVERY EVENING
Status: DISCONTINUED | OUTPATIENT
Start: 2017-09-21 | End: 2017-09-21 | Stop reason: HOSPADM

## 2017-09-20 RX ADMIN — CEFAZOLIN SODIUM 1 G: 1 INJECTION, POWDER, FOR SOLUTION INTRAMUSCULAR; INTRAVENOUS at 21:45

## 2017-09-20 RX ADMIN — SODIUM CHLORIDE 1000 ML: 9 INJECTION, SOLUTION INTRAVENOUS at 20:59

## 2017-09-20 ASSESSMENT — ENCOUNTER SYMPTOMS
VOMITING: 0
CHILLS: 0
FEVER: 0
NAUSEA: 0
WOUND: 0
COLOR CHANGE: 1

## 2017-09-20 NOTE — MR AVS SNAPSHOT
After Visit Summary   9/20/2017    Hoda Brush    MRN: [id number]           Patient Information     Date Of Birth          1955        Visit Information        Provider Department      9/20/2017 7:10 PM Codey Garcia PA-C [facility]        Today's Diagnoses     Lymphedema of left upper extremity    -  1    Breast cancer metastasized to bone, left (H)        Malignant neoplasm of left female breast, unspecified estrogen receptor status, unspecified site of breast (H)           Follow-ups after your visit        Your next 10 appointments already scheduled     Oct 10, 2017 12:30 PM CDT   [facility] Lab Draw with [facility] LAB DRAW   [facility] Lab Draw ([facility])    [address] [facility] [address] [phone]            Oct 10, 2017  1:00 PM CDT   (Arrive by 12:45 PM)   Return Visit with SEMAJ Walls   [facility] ([facility])    [address]  [address] [facility] [address]   [phone]            Oct 10, 2017  2:00 PM CDT   Infusion 60 with UC ONCOLOGY INFUSION, UC 15 ATC   [facility] ([facility])    [address]  [address] [facility] [address]   [phone]            Oct 31, 2017  1:30 PM CDT   [facility] Lab Draw with [facility] LAB DRAW   [facility] Lab Draw ([facility])    [address]  [address] [facility] [address]   [phone]            Oct 31, 2017  2:00 PM CDT   (Arrive by 1:45 PM)   Return Visit with Lisandro Aguiar MD   [facility] ([facility])    [address]  [address] [facility] [address]   [phone]            Oct 31, 2017  3:00 PM CDT   Infusion 60 with UC ONCOLOGY INFUSION, UC 15 ATC   [facility] ([facility])    [address]  [address] [facility] [address] [phone]              Who to contact     If you have questions or need follow up information about today's clinic visit or your schedule please contact [facility] directly at [phone].  Normal or non-critical lab and imaging results will be communicated to you by MyChart, letter or phone within 4 business days after the clinic has received the results. If you do not hear from us within 7 days, please contact the clinic through [employer] (?????)hart or phone. If you have a critical or abnormal lab result, we will notify you by phone as soon as possible.  Submit refill requests through [employer] or call your pharmacy and they will forward the refill request to us. Please allow 3 business days for your refill to be completed.          Additional Information About Your Visit        MyChart Information     [employer] gives you secure access to your electronic health record. If you see a primary care provider, you can also send messages to your care team and make appointments. If you have questions, please call your primary care clinic.  If you do not have a primary care provider, please call [phone] and they will assist you.        Care EveryWhere ID     This is your Care EveryWhere ID. This could be used by other organizations to access your [place] medical records  [id number]        Your Vitals Were     Pulse Temperature Pulse Oximetry BMI (Body Mass Index)          90 98.3  F (36.8  C) (Oral) 98% 29.06 kg/m2         Blood Pressure from Last 3 Encounters:   09/20/17 100/80   09/19/17 119/63   09/15/17 115/75    Weight from Last 3 Encounters:   09/20/17 172 lb 4.8 oz (78.2 kg)   09/19/17 171 lb 3.2 oz (77.7 kg)   09/15/17 169 lb 12.1 oz (77 kg)              Today, you had the following     No orders found for display       Primary Care Provider    Physician No Ref-Primary       No address on file        Equal Access  to Services     NOE VAZQUEZ : Hadii kamran Ramsey, waspencerda aparnaadaha, qagladysjoan sandersonalalexa espino. So [facility] [phone].    ATENCIÓN: Si habla bimal, tiene a lagunas disposición servicios gratuitos de asistencia lingüística. Llame al [phone].    We comply with applicable federal civil rights laws and [place] laws. We do not discriminate on the basis of race, color, national origin, age, disability sex, sexual orientation or gender identity.            Thank you!     Thank you for choosing [facility]  for your care. Our goal is always to provide you with excellent care. Hearing back from our patients is one way we can continue to improve our services. Please take a few minutes to complete the written survey that you may receive in the mail after your visit with us. Thank you!             Your Updated Medication List - Protect others around you: Learn how to safely use, store and throw away your medicines at www.disposemymeds.org.          This list is accurate as of: 9/20/17  7:54 PM.  Always use your most recent med list.                   Brand Name Dispense Instructions for use Diagnosis    order for DME     1 each    L UE class 2 compression sleeve and gauntlet Night garment Bandaging supplies    Lymphedema of left upper extremity       prochlorperazine 10 MG tablet    COMPAZINE    30 tablet    Take 1 tablet (10 mg) by mouth every 6 hours as needed (Nausea/Vomiting)    Malignant neoplasm of left female breast, unspecified estrogen receptor status, unspecified site of breast (H)       tamoxifen 20 MG tablet    NOLVADEX    90 tablet    Take 1 tablet (20 mg) by mouth daily    Cancer of central portion of left breast (H)       Vitamin D3 95728 UNITS Tabs

## 2017-09-20 NOTE — IP AVS SNAPSHOT
MRN:[id number]                      After Visit Summary   9/20/2017    Hoda Brush    MRN: [id number]           Thank you!     Thank you for choosing [facility] for your care. Our goal is always to provide you with excellent care. Hearing back from our patients is one way we can continue to improve our services. Please take a few minutes to complete the written survey that you may receive in the mail after you visit with us. Thank you!        Patient Information     Date Of Birth          1955        About your hospital stay     You were admitted on:  September 20, 2017 You last received care in the:  [facility]    You were discharged on:  September 21, 2017        Reason for your hospital stay       You were admitted due to an infection in the soft tissue of your left arm. This improved with IV antibiotics and you will be sent home with antibiotic in a pill form, which you will need to take over the next 2 weeks. Use tylenol for any discomfort.                  Who to Call     For medical emergencies, please call 911.  For non-urgent questions about your medical care, please call your primary care provider or clinic, None          Attending Provider     Provider Specialty    Trierweiler, Chad A, MD Emergency Medicine    [facility], Markus Blum III, MD Internal Medicine       Primary Care Provider    Physician No Ref-Primary      After Care Instructions     Activity       Your activity upon discharge: activity as tolerated            Diet       Follow this diet upon discharge: Regular Diet Adult                  Follow-up Appointments     Follow-up and recommended labs and tests        Follow up with primary care provider, within 7 days for hospital follow- up.  No follow up labs or test are needed.                  Your next 10 appointments already scheduled     Oct 10, 2017 12:30 PM [facility]   [place] [facility] Draw with  [facility] LAB DRAW   [facility] [place] [facility] ([facility]  [facility])    [address] [place] [id number]   [phone]            Oct 10, 2017  1:00 PM CDT   (Arrive by 12:45 PM)   Return Visit with SEMAJ Walls   [facility] ([facility])    [address] [place] [id number]   [phone]            Oct 10, 2017  2:00 PM CDT   Infusion 61 with UC ONCOLOGY INFUSION, UC 15 ATC   [facility] ([facility])    [address] [place] [address]   [phone]            Oct 31, 2017  1:30 PM CDT   [facility] Draw with  [facility] LAB DRAW   [facility] Lab Draw ([facility])    [address] [place] [id number]   [phone]            Oct 31, 2017  2:00 PM CDT   (Arrive by 1:45 PM)   Return Visit with Lisandro Aguiar MD   [facility] ([facility])    [address] [place] [address]   [phone]            Oct 31, 2017  3:00 PM CDT   Infusion 61 with UC ONCOLOGY INFUSION, UC 15 ATC   [facility] ([facility])    [address] [place] [address]   [phone]              Pending Results     Date and Time Order Name Status Description    9/19/2017 1552 NEXT GENERATION SEQUENCING In process             Statement of Approval     Ordered          09/21/17 1043  I have reviewed and agree with all the recommendations and orders detailed in this document.  EFFECTIVE NOW     Approved and electronically signed by:  Cash Nails PA-C             Admission Information     Date & Time Provider Department Dept. Phone    9/20/2017 Markus Natarajan III, MD [facility] [phone]      Your Vitals Were     Blood Pressure Temperature Respirations Height Weight Pulse Oximetry     "118/56 99  F (37.2  C) (Oral) 16 1.6 m (5' 3\") 79.4 kg (175 lb 1.6 oz) 97%    BMI (Body Mass Index)                   31.02 kg/m2           [employer] Information     [employer] gives you secure access to your electronic health record. If you see a primary care provider, you can also send messages to your care team and make appointments. If you have questions, please call your primary care clinic.  If you do not have a primary care provider, please call [phone] and they will assist you.        Care EveryWhere ID     This is your Care EveryWhere ID. This could be used by other organizations to access your [facility] medical records  [id number]        Equal Access to Services     NOE VAZQUEZ : Luz Marina Ramsey, ro jefferson, jamal wilkins, alexa beltran. So [facility] [phone].    ATENCIÓN: Si habla español, tiene a lagunas disposición servicios gratuitos de asistencia lingüística. Llame al [phone].    We comply with applicable federal civil rights laws and [place] laws. We do not discriminate on the basis of race, color, national origin, age, disability sex, sexual orientation or gender identity.               Review of your medicines      START taking        Dose / Directions    cephALEXin 500 MG capsule   Commonly known as:  KEFLEX   Indication:  Skin and Soft Tissue Infection        Dose:  500 mg   Take 1 capsule (500 mg) by mouth every 6 hours   Quantity:  56 capsule   Refills:  0         CONTINUE these medicines which have NOT CHANGED        Dose / Directions    order for DME   Used for:  Lymphedema of left upper extremity        L UE class 2 compression sleeve and gauntlet Night garment Bandaging supplies   Quantity:  1 each   Refills:  1       tamoxifen 20 MG tablet   Commonly known as:  NOLVADEX   Used for:  Cancer of central portion of left breast (H)        Dose:  20 mg   Take 1 tablet (20 mg) by mouth daily   Quantity:  90 tablet   Refills:  3       " VITAMIN D (CHOLECALCIFEROL) PO        Dose:  1000 Units   Take 1,000 Units by mouth daily   Refills:  0            Where to get your medicines      These medications were sent to [facility] [address] [place] [address] [place], [place] - [address]  [address] [place] [address]     Phone:  [phone]     cephALEXin 500 MG capsule               ANTIBIOTIC INSTRUCTION     You've Been Prescribed an Antibiotic - Now What?  Your healthcare team thinks that you or your loved one might have an infection. Some infections can be treated with antibiotics, which are powerful, life-saving drugs. Like all medications, antibiotics have side effects and should only be used when necessary. There are some important things you should know about your antibiotic treatment.      Your healthcare team may run tests before you start taking an antibiotic.    Your team may take samples (e.g., from your blood, urine or other areas) to run tests to look for bacteria. These test can be important to determine if you need an antibiotic at all and, if you do, which antibiotic will work best.      Within a few days, your healthcare team might change or even stop your antibiotic.    Your team may start you on an antibiotic while they are working to find out what is making you sick.    Your team might change your antibiotic because test results show that a different antibiotic would be better to treat your infection.    In some cases, once your team has more information, they learn that you do not need an antibiotic at all. They may find out that you don't have an infection, or that the antibiotic you're taking won't work against your infection. For example, an infection caused by a virus can't be treated with antibiotics. Staying on an antibiotic when you don't need it is more likely to be harmful than helpful.      You may experience side effects from your antibiotic.    Like all medications, antibiotics have side  effects. Some of these can be serious.    Let you healthcare team know if you have any known allergies when you are admitted to the hospital.    One significant side effect of nearly all antibiotics is the risk of severe and sometimes deadly diarrhea caused by Clostridium difficile (C. Difficile). This occurs when a person takes antibiotics because some good germs are destroyed. Antibiotic use allows C. diificile to take over, putting patients at high risk for this serious infection.    As a patient or caregiver, it is important to understand your or your loved one's antibiotic treatment. It is especially important for caregivers to speak up when patients can't speak for themselves. Here are some important questions to ask your healthcare team.    What infection is this antibiotic treating and how do you know I have that infection?    What side effects might occur from this antibiotic?    How long will I need to take this antibiotic?    Is it safe to take this antibiotic with other medications or supplements (e.g., vitamins) that I am taking?     Are there any special directions I need to know about taking this antibiotic? For example, should I take it with food?    How will I be monitored to know whether my infection is responding to the antibiotic?    What tests may help to make sure the right antibiotic is prescribed for me?      Information provided by:  www.cdc.gov/getsmart  U.S. Department of Health and Human Services  Centers for disease Control and Prevention  National Center for Emerging and Zoonotic Infectious Diseases  Division of Healthcare Quality Promotion         Protect others around you: Learn how to safely use, store and throw away your medicines at www.disposemymeds.org.             Medication List: This is a list of all your medications and when to take them. Check marks below indicate your daily home schedule. Keep this list as a reference.      Medications           Morning Afternoon Evening  Bedtime As Needed    cephALEXin 500 MG capsule   Commonly known as:  KEFLEX   Take 1 capsule (500 mg) by mouth every 6 hours                                order for DME   L UE class 2 compression sleeve and gauntlet Night garment Bandaging supplies                                tamoxifen 20 MG tablet   Commonly known as:  NOLVADEX   Take 1 tablet (20 mg) by mouth daily                                   VITAMIN D (CHOLECALCIFEROL) PO   Take 1,000 Units by mouth daily   Next Dose Due:  You did not take this today, you can take when you get home or resume tomorrow                                             More Information        ?????????? ????? ??????? ?? ????????: ??????? ????? (Discharge Instructions for Crohn s Disease)  ? ??? ???? ??????????????? ??????? ????? (Crohn s disease). ??? ??????????? ??????????????? ?????????? ? ??????????? ?????????-????????? ??????. ???????? ????????? ???? ????? ?????? ?????????-????????? ??????. ???????? ?? ??, ??? ?????????? ????????? ?? ??????? ????? ??????????, ?? ?????? ???????? ??????? ??? ?????? ????????? ???????????. ????? ?????????? ? ??????????, ?????????? ????????? ???????????? ????? ? ??????? ?? ????? ????????.  ???? ? ???????? ????????  ???????????? ?? ????? ?? ????? ? ???????? ???????? ???????? ? ???? ?????????:    ???????????????? ? ? ??????? ?????? ? ?????????? ???????? ??????????? ??? ??? ???? ???????.    ?????????? ????????????? ????????? ? ?????? ???????????? ? ?????????? ?????.    ???? ? ??? ????????? ?????????? ???????? ???????, ????????? ?? ???? ?????.    ?? ??????????? ????? ???????? ??? ??????????????? ???????????? ? ??????.    ?????? ?????? ?????????????? ???????? ???????????? ????? ???? ? ??????? ?????????? ???????. ? ??????????? ?? ?????? ?????????, ??? ????? ????: ?????? (????, ???, ????), ?????? ????, ???????? ????????, ?????? ?????? ? ?????. ????????? ????? ?????? ?????? ???????????? ????? ????, ??? ???????? ? ??????? ????????? ?????????. ???? ?????  ????????????? ??? ????????? ???????? ?????????? ???? ???? ????????? ? ??????????.    ?????????? ???????? ?????????? ???????? ????????? ??? ? ????, ? ?? ???????????? ?????? ? ????.    ?? ??????. ??????? ?????? ???????? ? ????????? ????????? ???????????.     ????????? ????????? ????? ??? ???????, ???? ???? ?? ?? ????????? ? ???? ?????-???? ?????????.    ???????? ? ??????? ?????? ??????? ??????? ????? ????? ?????????????? ?????????????. ????????????? ????????????? ?? ???????? ????????? ?????????? ?? ??????? ?????,, ?????? ????? ?????? ?????????????? ????????. ?????? ?? ????????? ?? ????? ??????? ?????? ?????? ??????, ???????? ?? ??? ???? ???????.    ??????? ?????? ? ????? ?????????. ?????????? ?  ???? ??????? ????? ? ??????  (Crohn s and Colitis Foundation) ?? ??????????? ?????? [phone] ??? ????? ???? [email or web address]  ?????????? ????????  ??? ?????? ?????????? ?? ?????? ??????????? ??????????? ????? ????. ??? ?? ?????, ??? ? ???? ????????? ?????, ??? ?????????? ???????? ???????? ????. ?????? ?????????? ???????????, ??? ????????? ???? ???? ????? ???????? ???? ????? ??????? ????????? ?????????. ????????????? ??????? ?? ???, ????? ???? ???????? ????????. ????????? ?????? ???????? ????? ????? ??????????? ? ??? ??????? ? ???????? ???????.     ????? ?????????? ????????? ? ??????? ??????  ??????????????? ????????? ?? ????? ??????? ?????? ? ?????? ????????????? ?????? ?? ????????? ?????????:    ??????? ???? ??? ??????? ?????? ????? ???    ???? ?? ???    ???? ? ??????? ????? (?????? ?????? ?????)    ??????????? 100,4 F (38 C) ??? ????, ??? ?????? ???????????, ????????? ??????? ??????    ?????? ??????? ??? ?????? ????    ????? ? ??????    ??????? ??? ?????    ????????? ?? ????, ???????? ????    ????????? ??????   Date Last Reviewed: 8/1/2016 2000-2017 [employer]. [address], [place] [address]. All rights reserved. This information is not intended as a substitute for professional medical care.  Always follow your healthcare professional's instructions.

## 2017-09-20 NOTE — IP AVS SNAPSHOT
[facility]    [address] [place] [address]    Phone:  [phone]                                       After Visit Summary   9/20/2017    Hoda Brush    MRN: [id number]           After Visit Summary Signature Page     I have received my discharge instructions, and my questions have been answered. I have discussed any challenges I see with this plan with the nurse or doctor.    ..........................................................................................................................................  Patient/Patient Representative Signature      ..........................................................................................................................................  Patient Representative Print Name and Relationship to Patient    ..................................................               ................................................  Date                                            Time    ..........................................................................................................................................  Reviewed by Signature/Title    ...................................................              ..............................................  Date                                                            Time

## 2017-09-20 NOTE — TELEPHONE ENCOUNTER
Reason for Disposition    Nursing judgment or information in reference    Protocols used: NO GUIDELINE AVAILABLE - SICK ADULT CALL-ADULT-AH  Cancer patient recently started chemo. Does not have a temp, but is showing signs of lymphedema skin infection she has had in the past and taken Erythromycin for. Requesting to speech with provider about a prescription. Paged Dr. Tan to [phone] via answering service.  Anjali Nelson RN  [facility]

## 2017-09-21 VITALS
BODY MASS INDEX: 31.02 KG/M2 | SYSTOLIC BLOOD PRESSURE: 118 MMHG | TEMPERATURE: 99 F | RESPIRATION RATE: 16 BRPM | DIASTOLIC BLOOD PRESSURE: 56 MMHG | OXYGEN SATURATION: 97 % | WEIGHT: 175.1 LBS | HEIGHT: 63 IN

## 2017-09-21 LAB
BASOPHILS # BLD AUTO: 0 10E9/L (ref 0–0.2)
BASOPHILS NFR BLD AUTO: 0.1 %
DIFFERENTIAL METHOD BLD: ABNORMAL
EOSINOPHIL # BLD AUTO: 0.1 10E9/L (ref 0–0.7)
EOSINOPHIL NFR BLD AUTO: 0.6 %
ERYTHROCYTE [DISTWIDTH] IN BLOOD BY AUTOMATED COUNT: 13.6 % (ref 10–15)
HCT VFR BLD AUTO: 31.6 % (ref 35–47)
HGB BLD-MCNC: 10 G/DL (ref 11.7–15.7)
IMM GRANULOCYTES # BLD: 0 10E9/L (ref 0–0.4)
IMM GRANULOCYTES NFR BLD: 0.3 %
LYMPHOCYTES # BLD AUTO: 3.7 10E9/L (ref 0.8–5.3)
LYMPHOCYTES NFR BLD AUTO: 25.3 %
MCH RBC QN AUTO: 29.4 PG (ref 26.5–33)
MCHC RBC AUTO-ENTMCNC: 31.6 G/DL (ref 31.5–36.5)
MCV RBC AUTO: 93 FL (ref 78–100)
MONOCYTES # BLD AUTO: 0.8 10E9/L (ref 0–1.3)
MONOCYTES NFR BLD AUTO: 5.7 %
NEUTROPHILS # BLD AUTO: 9.9 10E9/L (ref 1.6–8.3)
NEUTROPHILS NFR BLD AUTO: 68 %
NRBC # BLD AUTO: 0 10*3/UL
NRBC BLD AUTO-RTO: 0 /100
PLATELET # BLD AUTO: 187 10E9/L (ref 150–450)
RBC # BLD AUTO: 3.4 10E12/L (ref 3.8–5.2)
WBC # BLD AUTO: 14.5 10E9/L (ref 4–11)

## 2017-09-21 PROCEDURE — 25000132 ZZH RX MED GY IP 250 OP 250 PS 637: Performed by: PHYSICIAN ASSISTANT

## 2017-09-21 PROCEDURE — G0378 HOSPITAL OBSERVATION PER HR: HCPCS

## 2017-09-21 PROCEDURE — 85025 COMPLETE CBC W/AUTO DIFF WBC: CPT | Performed by: INTERNAL MEDICINE

## 2017-09-21 PROCEDURE — 40000257 ZZH STATISTIC CONSULT NO CHARGE VASC ACCESS

## 2017-09-21 PROCEDURE — 25000128 H RX IP 250 OP 636: Performed by: INTERNAL MEDICINE

## 2017-09-21 PROCEDURE — 96366 THER/PROPH/DIAG IV INF ADDON: CPT

## 2017-09-21 PROCEDURE — 99217 ZZC OBSERVATION CARE DISCHARGE: CPT | Performed by: PHYSICIAN ASSISTANT

## 2017-09-21 RX ORDER — CEPHALEXIN 500 MG/1
500 CAPSULE ORAL EVERY 6 HOURS SCHEDULED
Status: DISCONTINUED | OUTPATIENT
Start: 2017-09-21 | End: 2017-09-21 | Stop reason: HOSPADM

## 2017-09-21 RX ORDER — CEPHALEXIN 500 MG/1
500 CAPSULE ORAL EVERY 6 HOURS
Qty: 56 CAPSULE | Refills: 0 | Status: SHIPPED | OUTPATIENT
Start: 2017-09-21 | End: 2017-09-21

## 2017-09-21 RX ORDER — CEPHALEXIN 500 MG/1
500 CAPSULE ORAL EVERY 6 HOURS
Qty: 56 CAPSULE | Refills: 0 | Status: SHIPPED | OUTPATIENT
Start: 2017-09-21 | End: 2018-05-08

## 2017-09-21 RX ADMIN — CEFAZOLIN 1 G: 1 INJECTION, POWDER, FOR SOLUTION INTRAMUSCULAR; INTRAVENOUS at 05:46

## 2017-09-21 RX ADMIN — CEPHALEXIN 500 MG: 500 CAPSULE ORAL at 10:56

## 2017-09-21 RX ADMIN — HEPARIN SODIUM (PORCINE) LOCK FLUSH IV SOLN 100 UNIT/ML 5 ML: 100 SOLUTION at 11:06

## 2017-09-21 RX ADMIN — SODIUM CHLORIDE, PRESERVATIVE FREE 5 ML: 5 INJECTION INTRAVENOUS at 06:22

## 2017-09-21 NOTE — TELEPHONE ENCOUNTER
61 yo F with metastatic Breast Ca recently started on Trastuzumab. She got 1dose of Trastuzumab yesterday. Pt has baseline lymphodema, and now reports left arm, redness, swollen, very hot, hard and chills  2x hrs. No fever. Asked pt to go to nearest urgent care, to r/o cellulitis and getting antibiotics.

## 2017-09-21 NOTE — DISCHARGE SUMMARY
[facility]    Discharge Summary  Hospitalist    Date of Admission:  9/20/2017  Date of Discharge:  9/21/2017  Discharging Provider: Cash Nails PA-C    Discharge Diagnoses      Cellulitis of left upper extremity  Personal history of malignant neoplasm of breast    History of Present Illness   Hoda Brush is an 62 year old female who presented with left arm redness, stiffness, and swelling.    HPI from admission H&P:  Hoda Brush is a 62 year old female who presents with left arm redness, stiffness and swelling.     She is a recent [facility] and has established residency here. She speaks fairly good English and her daughter assists with translation. She has a PMH of left breast ER+/HER2+ Breast Cancer (Diagnosed in early 2014 with bony metastasis already present at that time, s/p left mastetctomy and lymph node dissection with lymphadenopathy) and is now following with oncology here. She reports previously having an episode of cellulitis superimposed on her left arm lymphadenopathy about one year ago which was treated as an outpatient with Erythromycin.     This visit, she reports 2 days of worsening left arm swelling, redness and stiffness. She reports that that the arm feels warm but denies fever or other systemic symptoms. She presented today to an urgent care who sent her to the ED, where she received Ancef and was admitted for observation.    Hospital Course   Hoda Brush was admitted on 9/20/2017.  The following problems were addressed during her hospitalization:    Left Arm Cellulitis on Chronic Lymphedema  Presented with 2d hx of worsening redness, stiffness, and swelling. Afebrile on arrival, leukocytosis to 20k therefore admission for observation was requested. LUE U/S was negative for DVT. She was given IV Ancef overnight with marked improvement in redness of the extremity. She remained afebrile and nontachycardic, leukocytosis improved to 14k  the following morning. She was eager to discharge, and was sent with a 14-day course of PO keflex 500mg QID. Reasons to return to ED were discussed and reviewed, patient will inform oncologist of infection, next chemo dose is scheduled for 10/10, which should not interfere with treatment.     Left Breast Cancer  Follows with [facility] (Notes available in care everywhere). Takes Tamoxifen QPM, to resume at discharge.    Cash Nails PA-C    Significant Results and Procedures   As noted below    Pending Results   These results will be followed up by oncology  Unresulted Labs Ordered in the Past 30 Days of this Admission     Date and Time Order Name Status Description    9/19/2017 1552 NEXT GENERATION SEQUENCING In process           Code Status   Full Code       Primary Care Physician   Physician No Ref-Primary    Physical Exam   Temp: 99  F (37.2  C) Temp src: Oral BP: 118/56   Heart Rate: 80 Resp: 16 SpO2: 97 % O2 Device: None (Room air)    Vitals:    09/20/17 2013 09/20/17 2240   Weight: 78 kg (172 lb) 79.4 kg (175 lb 1.6 oz)     Vital Signs with Ranges  Temp:  [97.9  F (36.6  C)-99  F (37.2  C)] 99  F (37.2  C)  Pulse:  [90] 90  Heart Rate:  [80-90] 80  Resp:  [16] 16  BP: (100-128)/(56-80) 118/56  SpO2:  [97 %-100 %] 97 %       GEN: well-developed, well-nourished, appears comfortable  PULM: lungs CTA bilaterally, no increased work of breathing, no wheeze, rales, rhonchi  CV: RRR, S1 & S2, no murmur  GI: soft, nontender, nondistended, no guarding or rigidity, +BS in all 4 quadrants  SKIN: warm & dry without rash, LUE erythematous and warm to touch, erythema does not extend past line of demarcation previously drawn, has full ROM at level of elbow and wrist, pulse 2+ and strong    Discharge Disposition   Discharged to home  Condition at discharge: Stable    Consultations This Hospital Stay   None    Time Spent on this Encounter   I, Cash Nails, personally saw the patient today and spent less than  or equal to 30 minutes discharging this patient.    Discharge Orders     Reason for your hospital stay   You were admitted due to an infection in the soft tissue of your left arm. This improved with IV antibiotics and you will be sent home with antibiotic in a pill form, which you will need to take over the next 2 weeks. Use tylenol for any discomfort.     Follow-up and recommended labs and tests    Follow up with primary care provider, within 7 days for hospital follow- up.  No follow up labs or test are needed.     Activity   Your activity upon discharge: activity as tolerated     Diet   Follow this diet upon discharge: Regular Diet Adult       Discharge Medications   Current Discharge Medication List      START taking these medications    Details   cephALEXin (KEFLEX) 500 MG capsule Take 1 capsule (500 mg) by mouth every 6 hours  Qty: 56 capsule, Refills: 0    Associated Diagnoses: Cellulitis of left upper extremity         CONTINUE these medications which have NOT CHANGED    Details   VITAMIN D, CHOLECALCIFEROL, PO Take 1,000 Units by mouth daily      tamoxifen (NOLVADEX) 20 MG tablet Take 1 tablet (20 mg) by mouth daily  Qty: 90 tablet, Refills: 3    Associated Diagnoses: Cancer of central portion of left breast (H)      order for [facility] L UE class 2 compression sleeve and gauntlet  Night garment  Bandaging supplies  Qty: 1 each, Refills: 1    Associated Diagnoses: Lymphedema of left upper extremity           Allergies   No Known Allergies  Data   Most Recent 3 CBC's:  Recent Labs   Lab Test  09/21/17   0549  09/20/17 2052 09/19/17   0916   WBC  14.5*  20.1*  6.9   HGB  10.0*  11.1*  11.9   MCV  93  93  95   PLT  187  207  216      Most Recent 3 BMP's:  Recent Labs   Lab Test  09/20/17 2052 09/19/17   0916  08/28/17   1702   NA  140  140  143   POTASSIUM  4.0  4.0  3.9   CHLORIDE  109  106  108   CO2  26  28  27   BUN  21  10  15   CR  0.84  0.76  0.75   ANIONGAP  5  6  7   TAYLER  8.1*  8.5  8.6   GLC  93  86   85     Most Recent 2 LFT's:  Recent Labs   Lab Test  09/19/17   0916  08/28/17   1702   AST  19  22   ALT  21  27   ALKPHOS  44  45   BILITOTAL  0.5  0.1*     Most Recent INR's and Anticoagulation Dosing History:  Anticoagulation Dose History     Recent Dosing and Labs Latest Ref Rng & Units 8/28/2017    INR 0.86 - 1.14 0.99        Most Recent 3 Troponin's:  Recent Labs   Lab Test  07/24/16   0919   TROPI  <0.015  The 99th percentile for upper reference range is 0.045 ug/L.  Troponin values in   the range of 0.045 - 0.120 ug/L may be associated with risks of adverse   clinical events.       Most Recent Cholesterol Panel:  Recent Labs   Lab Test  08/08/17   0824   CHOL  154   LDL  74   HDL  53   TRIG  133     Most Recent 6 Bacteria Isolates From Any Culture (See EPIC Reports for Culture Details):No lab results found.  Most Recent TSH, T4 and A1c Labs:  Recent Labs   Lab Test  08/08/17   0824   TSH  1.30     Results for orders placed or performed during the hospital encounter of 09/20/17   Arm, left, venous US    Narrative    US UPPER EXTREMITY VENOUS DUPLEX LEFT   9/20/2017 9:20 PM     HISTORY: Left arm swelling and redness.    COMPARISON: None.    FINDINGS: Gray-scale, color and Doppler spectral analysis ultrasound  was performed of the left arm. Compression and augmentation imaging  was performed.    There is no evidence for deep venous thrombosis. The veins compress  and augment normally.      Impression    IMPRESSION: No DVT.     JC RUEDA MD

## 2017-09-21 NOTE — PROGRESS NOTES
SUBJECTIVE:   Hoda Brush is a 62 year old female presenting with a chief complaint of left arm swelling and redness.  Onset of symptoms was 2 day(s) ago.  Course of illness is worsening.    Severity moderately severe  Current and Associated symptoms: swelling and redness  Treatment measures tried include None tried.  Predisposing factors include history of breast cancer with lymph node disection and removal and chemo and radiation therapy.  She was treated for 3 years in [place] and now started new [place] and regimen in [place] in [place] 2017    Past Medical History:   Diagnosis Date     Breast cancer metastasized to bone (H)      Current Outpatient Prescriptions   Medication Sig Dispense Refill     Cholecalciferol (VITAMIN D3) 05044 UNITS TABS        prochlorperazine (COMPAZINE) 10 MG tablet Take 1 tablet (10 mg) by mouth every 6 hours as needed (Nausea/Vomiting) 30 tablet 2     tamoxifen (NOLVADEX) 20 MG tablet Take 1 tablet (20 mg) by mouth daily 90 tablet 3     order for DME L UE class 2 compression sleeve and gauntlet  Night garment  Bandaging supplies 1 each 1     Social History   Substance Use Topics     Smoking status: Never Smoker     Smokeless tobacco: Never Used     Alcohol use No       ROS:  Review of systems negative except as stated above.    OBJECTIVE  :/80  Pulse 90  Temp 98.3  F (36.8  C) (Oral)  Wt 172 lb 4.8 oz (78.2 kg)  SpO2 98%  BMI 29.06 kg/m2  GENERAL APPEARANCE: healthy, alert and no distress  EYES: EOMI,  PERRL, conjunctiva clear  HENT: ear canals and TM's normal.  Nose and mouth without ulcers, erythema or lesions  NECK: supple, nontender, no lymphadenopathy  RESP: lungs clear to auscultation - no rales, rhonchi or wheezes  CV: regular rates and rhythm, normal S1 S2, no murmur noted  NEURO: Normal strength and tone, sensory exam grossly normal,  normal speech and mentation  SKIN: no suspicious lesions or rashes  Extremities: left upper extremity is with swelling and erythema  as warmth to touch.  Compartments are slightly tense and non-supple.    ASSESSMENT:  (I89.0) Lymphedema of left upper extremity  (primary encounter diagnosis)    (C50.912,  C79.51) Breast cancer metastasized to bone, left (H)    (C50.912) Malignant neoplasm of left female breast, unspecified estrogen receptor status, unspecified site of breast (H)    PLAN:  I am concerned for erysipylis and from lymphedema.  She will need a full work up for her CBC and interpretation for treatment and possible observation and possible IV antibiotics.  I am sending her to the next level of care. I called and gave report to the [facility] at the [facility].  Daughter will take mother to the ER by personal vehicle.

## 2017-09-21 NOTE — PLAN OF CARE
Problem: Patient Care Overview  Goal: Plan of Care/Patient Progress Review  Outcome: Improving  A&O. VSS. WBC has improved. Left arm redness is improving, remains hot to touch and swollen. Denies pain

## 2017-09-21 NOTE — ED PROVIDER NOTES
"  History     Chief Complaint:  Arm Swelling and Redness    HPI   Hoda Brush is a 62 year old female with a history of breast cancer, currently undergoing chemotherapy, who presents with swelling and redness to her left arm. The patient has lymphedema to her left arm, and noticed redness and hotness of the left arm today around 1700. The patient has had erysipelas in the past that presented just as her current symptoms are, for which she was prescribed Erythromycin. Today, she went to Urgent Care in hopes of getting more Erythromycin, but they sent her to the ED for evaluation due to the severity of the swelling and redness. The patient states she feels her arm is really hot and stiff, but denies any fever, chills, nausea, or vomiting. The patient is Nepalese-speaking, and had her daughter at the bedside to translate.    PE/DVT RISK FACTORS:  Sex:    Female  Hormones:   No  Tobacco:   No  Cancer:   Yes  Travel:   No  Surgery:   No  Other immobilization: No  Personal history:  No  Family history:  No    Allergies:  No known drug allergies    Medications:    Compazine  Nolvadex  Vitamin D3    Past Medical History:    Breast cancer metastasized to bone    Past Surgical History:    Appendectomy  Mastectomy and lymph node resection    Family History:    Breast cancer  Lung cancer    Social History:  Smoking status: No  Alcohol use: No  Marital Status:   [2]     Review of Systems   Constitutional: Negative for chills and fever.   Gastrointestinal: Negative for nausea and vomiting.   Skin: Positive for color change. Negative for rash and wound.   All other systems reviewed and are negative.    Physical Exam     Patient Vitals for the past 24 hrs:   BP Temp Temp src Heart Rate Resp SpO2 Height Weight   09/20/17 2013 128/70 97.9  F (36.6  C) Oral 90 16 100 % 1.6 m (5' 3\") 78 kg (172 lb)       Physical Exam  Eye:  Pupils are equal, round, and reactive.  Extraocular movements intact.    ENT:  No rhinorrhea.  " Moist mucus membranes.  Normal tongue and tonsil.    Cardiac:  Regular rate and rhythm.  No murmurs, gallops, or rubs.    Pulmonary:  Clear to auscultation bilaterally.  No wheezes, rales, or rhonchi.    Abdomen:  Positive bowel sounds.  Abdomen is soft and non-distended, without focal tenderness.    Musculoskeletal:  Normal movement of all extremities without evidence for deficit.     Skin:  Warm and dry without rashes.  There is diffuse, brawny, blanching redness and warmth circumferentially throughout the left upper extremity without lesion, fluctuance, or discharge.    Neurologic:  Non-focal exam without asymmetric weakness or numbness.     Psychiatric:  Normal affect with appropriate interaction with examiner.    Emergency Department Course     Imaging:  Radiographic findings were communicated with the patient who voiced understanding of the findings.    Arm, left, venous US:  No DVT.  As read by Radiology.    Laboratory:  CBC: WBC 20.1 (H), HGB 11.1 (L), o/w WNL ()   BMP: Calcium 8.1 (L), o/w WNL (Creatinine 0.84)  CRP inflammation: 5.4  Erythrocyte sedimentation rate auto: 19    Interventions:  2059: NS 1L IV Bolus  2145: 1 g Ancef IV    Emergency Department Course:  Past medical records, nursing notes, and vitals reviewed.  2047: I performed an exam of the patient and obtained history, as documented above.  IV inserted and blood drawn.  The patient was sent for a left venous ultrasound while in the emergency department, findings above.    2147: I rechecked the patient. Explained findings to the patient.    2152: I spoke to Dr. Natarajan of the hospitalist service who accepts the patient for admission.     Findings and plan explained to the Patient who consents to admission.   Discussed the patient with Dr. Natarajan, who will admit the patient to an observation bed for further monitoring, evaluation, and treatment.     Impression & Plan      Medical Decision Making:  This delightful Russian-speaking  62-year-old with breast cancer with metastasis to the bone on chemotherapy along with chronic lymphedema of the left arm presents with new redness and swelling to the left arm.  This is similar to a case of erysipelas she dealt with while in [place].  She denies any fevers or significant pain.  She has no other systemic symptoms.    On exam, there is obvious swelling to this arm with associated redness.  DVT was considered because of her cancer diagnosis, though there is no evidence of this on ultrasound.  Her white blood cell count is markedly elevated at greater than 20,000.  With this in addition to her recent chemotherapy use, I feel it is prudent to admit her to the hospital for IV antibiotics.  Strep species seemed to be the most likely, and therefore I chose to give her a dose of IV Ancef.  I spoke with Dr. Natarajan of the hospitalist service who agrees to admit the patient under observation status for IV antibiotics and trending of her white blood cell count.      Diagnosis:    ICD-10-CM   1. Cellulitis of left upper extremity L03.114   2. Personal history of malignant neoplasm of breast Z85.3     Disposition: Admitted to observation    Radha Franks  9/20/2017    EMERGENCY DEPARTMENT    I, Radha Franks, am serving as a scribe at 8:47 PM on 9/20/2017 to document services personally performed by Trierweiler, Chad A, MD based on my observations and the provider's statements to me.        Trierweiler, Chad A, MD  09/20/17 9770

## 2017-09-21 NOTE — PROGRESS NOTES
Pt. Is declining  services. Daughter states she will be here in the am. Arm outlined with marker. Denies pain at this time. Left arm is red, hot and swollen. Elevated on a pillow

## 2017-09-21 NOTE — NURSING NOTE
"Chief Complaint   Patient presents with     Urgent Care     pt states lymphodema, infection on left arm, redness, swollen, very hot, hard and chills  2x hrs        Initial /80  Pulse 90  Temp 98.3  F (36.8  C) (Oral)  Wt 172 lb 4.8 oz (78.2 kg)  SpO2 98%  BMI 29.06 kg/m2 Estimated body mass index is 29.06 kg/(m^2) as calculated from the following:    Height as of 9/15/17: 5' 4.57\" (1.64 m).    Weight as of this encounter: 172 lb 4.8 oz (78.2 kg).  Medication Reconciliation: complete      "

## 2017-09-21 NOTE — ED NOTES
"[facility]  ED Nurse Handoff Report    ED Chief complaint: arm swelling and redness (has lymphedema to left arm and today noticed redness and hot to touch)      ED Diagnosis:   Final diagnoses:   Cellulitis of left upper extremity   Personal history of malignant neoplasm of breast       Code Status: Full Code    Allergies: No Known Allergies    Activity level - Baseline/Home:  Independent    Activity Level - Current:   Independent     Needed?: Yes- Martiniquais (daughter is interpreting)    Isolation: No  Infection: Not Applicable    Bariatric?: No    Vital Signs:   Vitals:    09/20/17 2013   BP: 128/70   Resp: 16   Temp: 97.9  F (36.6  C)   TempSrc: Oral   SpO2: 100%   Weight: 78 kg (172 lb)   Height: 1.6 m (5' 3\")       Cardiac Rhythm: ,        Pain level: 0-10 Pain Scale: 0    Is this patient confused?: No    Patient Report: Initial Complaint: Arm swelling and redness  Focused Assessment: Hoda Brush is a 62 year old female with a history of breast cancer, currently undergoing chemotherapy, who presents with swelling and redness to her left arm. The patient has lymphedema to her left arm, and noticed redness and hotness of the left arm today around 1700. The patient has had erysipelas in the past that presented just as her current symptoms are, for which she was prescribed Erythromycin. Today, she went to Urgent Care in hopes of getting more Erythromycin, but they sent her to the ED for evaluation due to the severity of the swelling and redness. The patient states she feels her arm is really hot and stiff, but denies any fever, chills, nausea, or vomiting.  Tests Performed: See Epic.  Treatments provided: 1L NS IV bolus, IV ancef 1g.    Family Comments: Daughter at bedside.    OBS brochure/video discussed/provided to patient: Yes    ED Medications:   Medications   sodium chloride (PF) 0.9% PF flush 3 mL (not administered)   sodium chloride (PF) 0.9% PF flush 3 mL (not " administered)   0.9% sodium chloride BOLUS (1,000 mLs Intravenous New Bag 9/20/17 2059)     Followed by   0.9% sodium chloride infusion (not administered)   ceFAZolin (ANCEF) 1 g vial to attach to  ml bag for ADULT or 50 ml bag for PEDS (1 g Intravenous New Bag 9/20/17 2145)       Drips infusing?:  No      ED NURSE PHONE NUMBER: *[phone]

## 2017-09-21 NOTE — PHARMACY-ADMISSION MEDICATION HISTORY
Admission medication history interview status for the 9/20/2017  admission is complete. See EPIC admission navigator for prior to admission medications     Medication history source reliability:Good    Actions taken by pharmacist (provider contacted, etc): Interviewed patient and daughter.      Additional medication history information not noted on PTA med list :None    Medication reconciliation/reorder completed by provider prior to medication history? Yes    Time spent in this activity: 10 minutes    Prior to Admission medications    Medication Sig Last Dose Taking? Auth Provider   VITAMIN D, CHOLECALCIFEROL, PO Take 1,000 Units by mouth daily 9/20/2017 at am Yes Unknown, Entered By History   tamoxifen (NOLVADEX) 20 MG tablet Take 1 tablet (20 mg) by mouth daily 9/20/2017 at am Yes Lisandro Aguiar MD   order for DME L UE class 2 compression sleeve and gauntlet  Night garment  Bandaging supplies   Lisandro Aguiar MD Sharon Chandler, PharmD, BCPS

## 2017-09-21 NOTE — PLAN OF CARE
Problem: Patient Care Overview  Goal: Plan of Care/Patient Progress Review  Outcome: No Change  PRIOR TO DISCHARGE     Comments: Goals for discharge:   Cellulitis visually improving: Goal net met, marked   WBC count trending down: Goal not met

## 2017-09-21 NOTE — PLAN OF CARE
Redness on LUE improving from outline yesterday, skin hot. Denies pain. Tolerating regular diet. Will transition to PO abx and discharge this morning. Daughter translating for pt, refused .

## 2017-09-21 NOTE — DISCHARGE SUMMARY
Patient has been discharged to home by wheelchair. Home medication regimen and new medications discussed with patient and patient verbalizes understanding. Diet and activity and wound care discussed with patient and patient verbalizes understanding. Upcoming appointments also discussed. Patient had no questions. Daughter present and translated for pt.

## 2017-09-21 NOTE — H&P
[facility]    History and Physical  Hospitalist       Date of Admission:  9/20/2017    Assessment & Plan   Hoda Brush is a 62 year old female who presents with left arm redness, stiffness and swelling.    She is a recent refugee from [facility] and has established residency here. She speaks fairly good English and her daughter assists with translation. She has a PMH of left breast ER+/HER2+ Breast Cancer (Diagnosed in early 2014 with bony metastasis already present at that time, s/p left mastetctomy and lymph node dissection with lymphadenopathy) and is now following with oncology here. She reports previously having an episode of cellulitis superimposed on her left arm lymphadenopathy about one year ago which was treated as an outpatient with Erythromycin.    This visit, she reports 2 days of worsening left arm swelling, redness and stiffness. She reports that that the arm feels warm but denies fever or other systemic symptoms. She presented today to an urgent care who sent her to the ED, where she received Ancef and was admitted for observation.    Her recent health has otherwise been stable. She had a right-sided chest wall port placed last week.    Left Arm Cellulitis on Chronic Lymphedema  - Good candidate for home therapy but noted to have WBC count of 20k in ED  - LUE Ultrasound negative for DVT  - Continue Ancef started in ED overnight  - Admit to observation  - If erythema and leukocytosis improving, hopeful for discharge tomorrow on oral antibiotics    Left Breast Cancer  - Follows with [facility] (Notes available in care everywhere)  - Takes Tamoxifen QPM, already had dose tonight (Advised to bring in home med if she requires a second midnight)    DVT Prophylaxis: Ambulatory / Obs, if remains second midnight would start lovenox  Code Status: Full Code    Disposition: Expected discharge tomorrow pending improvement in leukocytosis / cellulitis     Markus Natarajan  III, MD    Primary Care Physician   Physician No Ref-Primary    Chief Complaint   Left arm swelling and redness    History is obtained from the patient, patient's daughter, conversation with ED physician and review of medical records    History of Present Illness   Hoda Brush is a 62 year old female who presents with left arm redness, stiffness and swelling.    She is a recent refugee from [facility] and has established residency here. She speaks fairly good English and her daughter assists with translation. She has a PMH of left breast ER+/HER2+ Breast Cancer (Diagnosed in early 2014 with bony metastasis already present at that time, s/p left mastetctomy and lymph node dissection with lymphadenopathy) and is now following with oncology here. She reports previously having an episode of cellulitis superimposed on her left arm lymphadenopathy about one year ago which was treated as an outpatient with Erythromycin.    This visit, she reports 2 days of worsening left arm swelling, redness and stiffness. She reports that that the arm feels warm but denies fever or other systemic symptoms. She presented today to an urgent care who sent her to the ED, where she received Ancef and was admitted for observation.    Past Medical History    Left Breast Cancer    Past Surgical History   Left Mastectomy and Lymph Node Dissection  Right Chest Wall Port      Prior to Admission Medications   Prior to Admission Medications   Prescriptions Last Dose Informant Patient Reported? Taking?   VITAMIN D, CHOLECALCIFEROL, PO 9/20/2017 at am Self Yes Yes   Sig: Take 1,000 Units by mouth daily   order for DME   No No   Sig: L UE class 2 compression sleeve and gauntlet  Night garment  Bandaging supplies   tamoxifen (NOLVADEX) 20 MG tablet 9/20/2017 at am Self No Yes   Sig: Take 1 tablet (20 mg) by mouth daily      Facility-Administered Medications: None     Allergies   No Known Allergies    Social History   I have personally reviewed the  social history with the patient showing no tobacco or alcohol use. Refugee from [facility]. Residency established here.    Family History   Family history reviewed with patient and is noncontributory.    Review of Systems   The 10 point Review of Systems is negative other than noted in the HPI or here.     Physical Exam   Temp: 97.9  F (36.6  C) Temp src: Oral BP: 128/70   Heart Rate: 90 Resp: 16 SpO2: 100 % O2 Device: None (Room air)    Vital Signs with Ranges  Temp:  [97.9  F (36.6  C)-98.3  F (36.8  C)] 97.9  F (36.6  C)  Pulse:  [90] 90  Heart Rate:  [90] 90  Resp:  [16] 16  BP: (100-128)/(70-80) 128/70  SpO2:  [98 %-100 %] 100 %  172 lbs 0 oz    Constitutional: NAD. Comfortable, well-developed  Eyes: Anicteric, no conjunctival injection  ENT: Atraumatic, membranes moist   Neck: Supple, trachea midline  Respiratory: No wheeze or crackles. Breathing comfortably on room air  Cardiovascular: S1S2, no edema  GI: Abdomen soft, non-tender  Skin: Warm, pink, dry. Right Chest Wall port in place without tenderness or erythema. Left arm cellulitic changes with swelling, redness, induration and erythema extending from proximal left upper extremity (Axilla fully spared) to mid forearm. Circumferential, blotchy in appearance  Neurologic: Alert and oriented. CN II - XII grossly intact  Psychiatric: Pleasant, cooperative    Data   Data reviewed today:  I personally reviewed no images or EKG's today.    Recent Labs  Lab 09/20/17 2052 09/19/17  0916   WBC 20.1* 6.9   HGB 11.1* 11.9   MCV 93 95    216    140   POTASSIUM 4.0 4.0   CHLORIDE 109 106   CO2 26 28   BUN 21 10   CR 0.84 0.76   ANIONGAP 5 6   TAYLER 8.1* 8.5   GLC 93 86   ALBUMIN  --  3.2*   PROTTOTAL  --  7.7   BILITOTAL  --  0.5   ALKPHOS  --  44   ALT  --  21   AST  --  19       Imaging:  Recent Results (from the past 24 hour(s))   Arm, left, venous US    Narrative    US UPPER EXTREMITY VENOUS DUPLEX LEFT   9/20/2017 9:20 PM     HISTORY: Left arm  swelling and redness.    COMPARISON: None.    FINDINGS: Gray-scale, color and Doppler spectral analysis ultrasound  was performed of the left arm. Compression and augmentation imaging  was performed.    There is no evidence for deep venous thrombosis. The veins compress  and augment normally.      Impression    IMPRESSION: No DVT.     JC RUEDA MD

## 2017-09-22 ENCOUNTER — CARE COORDINATION (OUTPATIENT)
Dept: CARE COORDINATION | Facility: CLINIC | Age: 62
End: 2017-09-22

## 2017-09-25 ENCOUNTER — CARE COORDINATION (OUTPATIENT)
Dept: CARE COORDINATION | Facility: CLINIC | Age: 62
End: 2017-09-25

## 2017-09-25 NOTE — PROGRESS NOTES
Clinic Care Coordination Contact  OUTREACH    Referral Information:  Referral Source: IP/TCU Report  Reason for Contact: hospital follow up  Care Conference: No     Universal Utilization:   ED Visits in last year: 0  Hospital visits in last year: 1  Last PCP appointment: 08/08/17  Missed Appointments:  (no concerns)  Concerns: see notes  Multiple Providers or Specialists: oncology    Clinical Concerns:  Current Medical Concerns: dx'd with cellulitis of lymphedema affected upper extremity (lymphedema secondary to breast cancer treatment). Recently transitioned from IV antibiotics to oral antibiotics.     Current Behavioral Concerns: none    Education Provided to patient: finish antibiotics as directed   Clinical Pathway Name: None  Clinical Pathway: None    Medication Management:  Antibiotic therapy as above     Functional Status:  Mobility Status: Independent     Transportation: not discussed           Psychosocial:  Current living arrangement:: Not Assessed  Financial/Insurance: not discussed       Resources and Interventions:  Current Resources:  (none);  (none)  PAS Number:  (na)  Senior Linkage Line Referral Placed:  (na)  Advanced Care Plans/Directives on file:: No  Referrals Placed:  (na)     Goals:   Goal 1 Statement: i will finish my antibiiotics as instructed (i will finish my antibiotics as instructed)  Goal 1 Progression Percent: 50%  Goal 1 Progression Date: 09/25/17              Barriers: none  Strengths: family support  Patient/Caregiver understanding: good  Frequency of Care Coordination: monthly  Upcoming appointment: 10/10/17     Plan: received verbal permission from patient to speak with daughter by phone and to communicate with her directly using [employer]. Will have patient sign appropriate consent forms at next appointment. Daughter has already sent a [employer] msg to patient's oncologist inquiring whether hospital follow up should be with oncologist or PCP. She sent the message yesterday but has  not yet heard back. She will wait another day for their direction otherwise we will work with PCP to determine follow up plan. Daughter and patient (on 3 way conference call with me) agree with plan    Sarita Dent R.N.  Clinic Care Coordinator  [facility]  [phone]

## 2017-09-25 NOTE — PROGRESS NOTES
Spoke to patient's daughter to discuss post ED for cellulitis of arm involved with Lymphedema. Patient currently taking Keflex for two weeks with improvement and instructed to complete Keflex as prescribed. ED MD recommended seeing PMD, but patient's daughter requesting to be seen by [facility]. Message sent to scheduling to arrange an appointment with an [facility] this week. Instructed daughter to go to the ED if becomes febrile with a temperature >100.4, side effects from Keflex or worsening cellulitis. Answered all patient's daughter's questions and verbalized understanding. Cortney Ramos RN, BSN.

## 2017-09-25 NOTE — PROGRESS NOTES
Clinic Care Coordination Contact  Care Team Conversations    Se Sherman response to patient's daughter.     Sarita Dent R.N.  Clinic Care Coordinator  [facility]  [phone]

## 2017-09-25 NOTE — LETTER
[facility]  Complex Care Plan  About Me  Patient Name:  Hoda Brush    YOB: 1955  Age:   62 year old   [place] MRN: [id number] Telephone Information:     Home Phone [phone]   Mobile [phone]       Address:    [address]  [facility] [address] Email address:  [email or web address]      Emergency Contact(s)  Name Relationship Lgl Grd Work Phone Home Phone Mobile Phone   Kailyn FONTANEZ,* Daughter  NONE  [phone]           Primary language:  Argentine     needed? No   [facility] [employer]:  [phone] op. 1  Other communication barriers: No  Preferred Method of Communication:  Rafat, Phone  Current living arrangement: Not Assessed  Mobility Status/ Medical Equipment: Independent  Other information to know about me:    Health Maintenance  Health Maintenance Reviewed:      My Access Plan  Medical Emergency 911   Primary Clinic Line [facility]- [phone]   24 Hour Appointment Line [phone] or  [phone]) (toll-free)   24 Hour Nurse Line [phone] (toll-free)   Preferred Urgent Care [facility] [phone]   Preferred Hospital [facility]  [phone]   Preferred [facility] [place] [facility] - [place], [place] - [address]     Behavioral Health Crisis Line The National Suicide Prevention Lifeline at 1-414.912.5335 or 898     My Care Team Members  Patient Care Team       Relationship Specialty Notifications Start End    Lisandro Aguiar MD MD Oncology  8/22/17     Phone: [phone] Fax: [phone]         [address] [facility] [address]    Yi Dent, RN Clinic Care Coordinator  Admissions 9/22/17     Comment:  [phone]         My Care Plans  Self Management and Treatment Plan  Goals and (Comments)  Goal #1: i will finish my antibiiotics as instructed (i will finish my  antibiotics as instructed)      50% of goal reached    Goal #2:          of goal reached    Goal #3:          of goal reached    Goal #4:         of goal reached     Goal #5:          of goal reached  -  Goal #6:          of goal reached    Goal #7:          of goal reached    Goal #8:           of goal reached        Goal #9:          of goal reached    Goal #10:        of goal reached    Action Plans on File: None  Advance Care Plans/Directives Type:   Type Advanced Care Plans/Directives:  (na)    My Medical and Care Information  Problem List   Patient Active Problem List   Diagnosis     Malignant neoplasm of left female breast, unspecified estrogen receptor status, unspecified site of breast (H)     Bone metastasis (H)     Cellulitis of left upper extremity      Current Medications and Allergies:  See printed Medication Report.    Care Coordination Start Date: 09/25/17   Frequency of Care Coordination: monthly   Form Last Updated: 09/25/2017

## 2017-09-28 ENCOUNTER — APPOINTMENT (OUTPATIENT)
Dept: LAB | Facility: CLINIC | Age: 62
End: 2017-09-28
Attending: INTERNAL MEDICINE
Payer: COMMERCIAL

## 2017-09-28 ENCOUNTER — ONCOLOGY VISIT (OUTPATIENT)
Dept: ONCOLOGY | Facility: CLINIC | Age: 62
End: 2017-09-28
Attending: PHYSICIAN ASSISTANT
Payer: COMMERCIAL

## 2017-09-28 VITALS
RESPIRATION RATE: 16 BRPM | TEMPERATURE: 98.3 F | DIASTOLIC BLOOD PRESSURE: 79 MMHG | SYSTOLIC BLOOD PRESSURE: 130 MMHG | BODY MASS INDEX: 30.79 KG/M2 | HEART RATE: 93 BPM | WEIGHT: 173.8 LBS | OXYGEN SATURATION: 98 %

## 2017-09-28 DIAGNOSIS — L03.114 CELLULITIS OF LEFT UPPER EXTREMITY: Primary | ICD-10-CM

## 2017-09-28 LAB
BASOPHILS # BLD AUTO: 0 10E9/L (ref 0–0.2)
BASOPHILS NFR BLD AUTO: 0.5 %
DIFFERENTIAL METHOD BLD: ABNORMAL
EOSINOPHIL # BLD AUTO: 0.5 10E9/L (ref 0–0.7)
EOSINOPHIL NFR BLD AUTO: 5.3 %
ERYTHROCYTE [DISTWIDTH] IN BLOOD BY AUTOMATED COUNT: 12.8 % (ref 10–15)
HCT VFR BLD AUTO: 39 % (ref 35–47)
HGB BLD-MCNC: 12 G/DL (ref 11.7–15.7)
IMM GRANULOCYTES # BLD: 0 10E9/L (ref 0–0.4)
IMM GRANULOCYTES NFR BLD: 0.2 %
LYMPHOCYTES # BLD AUTO: 2.9 10E9/L (ref 0.8–5.3)
LYMPHOCYTES NFR BLD AUTO: 34.2 %
MCH RBC QN AUTO: 30.2 PG (ref 26.5–33)
MCHC RBC AUTO-ENTMCNC: 30.8 G/DL (ref 31.5–36.5)
MCV RBC AUTO: 98 FL (ref 78–100)
MONOCYTES # BLD AUTO: 0.6 10E9/L (ref 0–1.3)
MONOCYTES NFR BLD AUTO: 6.7 %
NEUTROPHILS # BLD AUTO: 4.6 10E9/L (ref 1.6–8.3)
NEUTROPHILS NFR BLD AUTO: 53.1 %
NRBC # BLD AUTO: 0 10*3/UL
NRBC BLD AUTO-RTO: 0 /100
PLATELET # BLD AUTO: 170 10E9/L (ref 150–450)
RBC # BLD AUTO: 3.98 10E12/L (ref 3.8–5.2)
WBC # BLD AUTO: 8.6 10E9/L (ref 4–11)

## 2017-09-28 PROCEDURE — 99212 OFFICE O/P EST SF 10 MIN: CPT | Mod: ZF

## 2017-09-28 PROCEDURE — 99213 OFFICE O/P EST LOW 20 MIN: CPT | Mod: ZP | Performed by: PHYSICIAN ASSISTANT

## 2017-09-28 PROCEDURE — 85025 COMPLETE CBC W/AUTO DIFF WBC: CPT | Performed by: PHYSICIAN ASSISTANT

## 2017-09-28 PROCEDURE — 36415 COLL VENOUS BLD VENIPUNCTURE: CPT

## 2017-09-28 ASSESSMENT — PAIN SCALES - GENERAL: PAINLEVEL: NO PAIN (0)

## 2017-09-28 NOTE — PROGRESS NOTES
Hematology-Oncology Visit  Sep 28, 2017    Reason for Visit: hospital follow-up left arm cellulitis     HPI: Hoda Brush is a 62 year old female with metastatic ER positive, HER 2 positive left breast cancer with bone mets. She recently came to the  as a refugee and established care with Dr. Aguiar. She resumed treatment with Herceptin and is on daily tamoxifen. Her last dose of Herceptin was on 9/19. She was admitted 9/20-9/21 with LUE cellulitis with leukocytosis. Given a dose of IV Ancef and discharged on keflex. She presents for follow-up of this.     Interval History: Hoda is here with a professional Trinidadian  and feels well. She notes the erythema has completely gone away from left arm and her edema is almost back to baseline. She has had no fevers/chills. Taking Keflex QID without any problems. No nausea, anorexia, or diarrhea. She is eating and drinking well. Has no other concerns today. ROS otherwise negative.     Current Outpatient Prescriptions   Medication     cephALEXin (KEFLEX) 500 MG capsule     VITAMIN D, CHOLECALCIFEROL, PO     tamoxifen (NOLVADEX) 20 MG tablet     order for DME     No current facility-administered medications for this visit.        PHYSICAL EXAM:  /79  Pulse 93  Temp 98.3  F (36.8  C) (Oral)  Resp 16  Wt 78.8 kg (173 lb 12.8 oz)  SpO2 98%  BMI 30.79 kg/m2  General: Alert, oriented, pleasant, NAD  Skin: Left arm has very minimal edema compared to R. Skin is pink in areas but no erythema, warmth, or tenderness.  Lungs: CTA bilaterally, normal work of breathing  Cardiac: RRR, S1, S2, no murmurs    Labs:    9/20/2017 20:52 9/21/2017 05:49 9/28/2017 14:03   Sodium 140     Potassium 4.0     Chloride 109     Carbon Dioxide 26     Urea Nitrogen 21     Creatinine 0.84     GFR Estimate 69     GFR Estimate If Black 83     Calcium 8.1 (L)     Anion Gap 5     CRP Inflammation 5.4     Glucose 93     WBC 20.1 (H) 14.5 (H) 8.6   Hemoglobin 11.1 (L) 10.0 (L) 12.0    Hematocrit 33.8 (L) 31.6 (L) 39.0   Platelet Count 207 187 170   RBC Count 3.65 (L) 3.40 (L) 3.98   MCV 93 93 98   MCH 30.4 29.4 30.2   MCHC 32.8 31.6 30.8 (L)   RDW 13.5 13.6 12.8   Diff Method Automated Method Automated Method Automated Method   % Neutrophils 77.0 68.0 53.1   % Lymphocytes 16.6 25.3 34.2   % Monocytes 5.8 5.7 6.7   % Eosinophils 0.3 0.6 5.3   % Basophils 0.1 0.1 0.5   % Immature Granulocytes 0.2 0.3 0.2   Nucleated RBCs 0 0 0   Absolute Neutrophil 15.5 (H) 9.9 (H) 4.6   Absolute Lymphocytes 3.3 3.7 2.9   Absolute Monocytes 1.2 0.8 0.6   Absolute Eosinophils 0.1 0.1 0.5   Absolute Basophils 0.0 0.0 0.0   Abs Immature Granulocytes 0.1 0.0 0.0   Absolute Nucleated RBC 0.0 0.0 0.0   Sed Rate 19     US UPPER EXTREMITY VENOUS DUPLEX LEFT          Assessment & Plan:     1. Left upper extremity cellulitis: S/p 1 dose Ancef following by oral Keflex 500 mg QID. Her leukocytosis has resolved and there is no clinical evidence of cellulitis. Continue Keflex to complete 10 day course. Call if symptoms return.    She has routine follow-up with us in a couple of week prior to next Herceptin dose.     Anne Lea PA-C    [facility] [address] [address]  [phone]

## 2017-09-28 NOTE — LETTER
9/28/2017      RE: Hoda Brush  [address] [facility] [address]       Hematology-Oncology Visit  Sep 28, 2017    Reason for Visit: hospital follow-up left arm cellulitis     HPI: Hoda Brush is a 62 year old female with metastatic ER positive, HER 2 positive left breast cancer with bone mets. She recently came to the  as a refugee and established care with Dr. Aguiar. She resumed treatment with Herceptin and is on daily tamoxifen. Her last dose of Herceptin was on 9/19. She was admitted 9/20-9/21 with LUE cellulitis with leukocytosis. Given a dose of IV Ancef and discharged on keflex. She presents for follow-up of this.     Interval History: Hoda is here with a professional Senegalese  and feels well. She notes the erythema has completely gone away from left arm and her edema is almost back to baseline. She has had no fevers/chills. Taking Keflex QID without any problems. No nausea, anorexia, or diarrhea. She is eating and drinking well. Has no other concerns today. ROS otherwise negative.     Current Outpatient Prescriptions   Medication     cephALEXin (KEFLEX) 500 MG capsule     VITAMIN D, CHOLECALCIFEROL, PO     tamoxifen (NOLVADEX) 20 MG tablet     order for DME     No current facility-administered medications for this visit.        PHYSICAL EXAM:  /79  Pulse 93  Temp 98.3  F (36.8  C) (Oral)  Resp 16  Wt 78.8 kg (173 lb 12.8 oz)  SpO2 98%  BMI 30.79 kg/m2  General: Alert, oriented, pleasant, NAD  Skin: Left arm has very minimal edema compared to R. Skin is pink in areas but no erythema, warmth, or tenderness.  Lungs: CTA bilaterally, normal work of breathing  Cardiac: RRR, S1, S2, no murmurs    Labs:    9/20/2017 20:52 9/21/2017 05:49 9/28/2017 14:03   Sodium 140     Potassium 4.0     Chloride 109     Carbon Dioxide 26     Urea Nitrogen 21     Creatinine 0.84     GFR Estimate 69     GFR Estimate If Black 83     Calcium 8.1 (L)     Anion Gap 5     CRP  Inflammation 5.4     Glucose 93     WBC 20.1 (H) 14.5 (H) 8.6   Hemoglobin 11.1 (L) 10.0 (L) 12.0   Hematocrit 33.8 (L) 31.6 (L) 39.0   Platelet Count 207 187 170   RBC Count 3.65 (L) 3.40 (L) 3.98   MCV 93 93 98   MCH 30.4 29.4 30.2   MCHC 32.8 31.6 30.8 (L)   RDW 13.5 13.6 12.8   Diff Method Automated Method Automated Method Automated Method   % Neutrophils 77.0 68.0 53.1   % Lymphocytes 16.6 25.3 34.2   % Monocytes 5.8 5.7 6.7   % Eosinophils 0.3 0.6 5.3   % Basophils 0.1 0.1 0.5   % Immature Granulocytes 0.2 0.3 0.2   Nucleated RBCs 0 0 0   Absolute Neutrophil 15.5 (H) 9.9 (H) 4.6   Absolute Lymphocytes 3.3 3.7 2.9   Absolute Monocytes 1.2 0.8 0.6   Absolute Eosinophils 0.1 0.1 0.5   Absolute Basophils 0.0 0.0 0.0   Abs Immature Granulocytes 0.1 0.0 0.0   Absolute Nucleated RBC 0.0 0.0 0.0   Sed Rate 19     US UPPER EXTREMITY VENOUS DUPLEX LEFT          Assessment & Plan:     1. Left upper extremity cellulitis: S/p 1 dose Ancef following by oral Keflex 500 mg QID. Her leukocytosis has resolved and there is no clinical evidence of cellulitis. Continue Keflex to complete 10 day course. Call if symptoms return.    She has routine follow-up with us in a couple of week prior to next Herceptin dose.     Anne Lea PA-C    [facility]  [address] [place] [address]  [phone]

## 2017-09-28 NOTE — NURSING NOTE
"Oncology Rooming Note    September 28, 2017 2:19 PM   Hoda Brush is a 62 year old female who presents for:    Chief Complaint   Patient presents with     Blood Draw     Oncology Clinic Visit     Return for post hosp visit      Initial Vitals: /79  Pulse 93  Temp 98.3  F (36.8  C) (Oral)  Resp 16  Wt 78.8 kg (173 lb 12.8 oz)  SpO2 98%  BMI 30.79 kg/m2 Estimated body mass index is 30.79 kg/(m^2) as calculated from the following:    Height as of 9/20/17: 1.6 m (5' 3\").    Weight as of this encounter: 78.8 kg (173 lb 12.8 oz). Body surface area is 1.87 meters squared.  No Pain (0) Comment: Data Unavailable   No LMP recorded. Patient is postmenopausal.  Allergies reviewed: Yes  Medications reviewed: Yes Yes  Medications: Medication refills not needed today.  Pharmacy name entered into Fetch It:    [id number] [place] [place] - [address] [facility] - [place], [place] - [address] [facility] [address] [place], [place] [address] - [place], [place] - [address] AT  [address] [facility]    Clinical concerns: Infection  Akkerman  was notified.    6 minutes for nursing intake (face to face time)     Amanda Hernandez MA              "

## 2017-09-28 NOTE — MR AVS SNAPSHOT
After Visit Summary   9/28/2017    Hoda Brush    MRN: [id number]           Patient Information     Date Of Birth          1955        Visit Information        Provider Department      9/28/2017 2:15 PM Anne Lea PA; [place] [facility]        Today's Diagnoses     Cellulitis of left upper extremity    -  1       Follow-ups after your visit        Your next 10 appointments already scheduled     Oct 10, 2017 12:30 PM CDT   M[facility] Lab Draw with [facility] LAB DRAW   [facility] Lab Draw ([facility])    [address] [place] [address]   [phone]            Oct 10, 2017  1:00 PM CDT   (Arrive by 12:45 PM)   Return Visit with SEMAJ Walls   [facility] ([facility])    [address] [place] [address]   [phone]            Oct 10, 2017  2:00 PM CDT   Infusion 60 with UC ONCOLOGY INFUSION, UC 15 ATC   [facility] ([facility])    [address] [place] [address]   [phone]            Oct 31, 2017  1:30 PM CDT   M[facility]ic Lab Draw with [facility] MASONIC LAB DRAW   [facility] Lab Draw ([facility])    [address] [place] [address]   [phone]            Oct 31, 2017  2:00 PM CDT   (Arrive by 1:45 PM)   Return Visit with Lisandro Aguiar MD   [facility] ([facility])    [address] [place] [address]   [phone]            Oct 31, 2017  3:00 PM CDT   Infusion 60 with UC ONCOLOGY INFUSION, UC 15 ATC   [facility] ([facility])    [address] [place] [address]   [phone]              Who to contact     If you have  questions or need follow up information about today's clinic visit or your schedule please contact [facility] directly at [phone].  Normal or non-critical lab and imaging results will be communicated to you by MyChart, letter or phone within 4 business days after the clinic has received the results. If you do not hear from us within 7 days, please contact the clinic through "[employer]"hart or phone. If you have a critical or abnormal lab result, we will notify you by phone as soon as possible.  Submit refill requests through [employer] or call your pharmacy and they will forward the refill request to us. Please allow 3 business days for your refill to be completed.          Additional Information About Your Visit        "[employer] Information     [employer] gives you secure access to your electronic health record. If you see a primary care provider, you can also send messages to your care team and make appointments. If you have questions, please call your primary care clinic.  If you do not have a primary care provider, please call [phone] and they will assist you.        Care EveryWhere ID     This is your Care EveryWhere ID. This could be used by other organizations to access your [place] medical records  [id number]        Your Vitals Were     Pulse Temperature Respirations Pulse Oximetry BMI (Body Mass Index)       93 98.3  F (36.8  C) (Oral) 16 98% 30.79 kg/m2        Blood Pressure from Last 3 Encounters:   09/28/17 130/79   09/21/17 118/56   09/20/17 100/80    Weight from Last 3 Encounters:   09/28/17 78.8 kg (173 lb 12.8 oz)   09/20/17 79.4 kg (175 lb 1.6 oz)   09/20/17 78.2 kg (172 lb 4.8 oz)              We Performed the Following     CBC with platelets differential        Primary Care Provider    None Specified       No primary provider on file.        Equal Access to Services     NOE VAZQUEZ : ro Leach qaybta kaalmada adeegyada, waxay idiin hayaan  doug alvarado'aanba ah. So [facility] [phone].    ATENCIÓN: Si habla bimal, tiene a lagunas disposición servicios gratuitos de asistencia lingüística. Luis al [phone].    We comply with applicable federal civil rights laws and [place] laws. We do not discriminate on the basis of race, color, national origin, age, disability sex, sexual orientation or gender identity.            Thank you!     Thank you for choosing [facility]  for your care. Our goal is always to provide you with excellent care. Hearing back from our patients is one way we can continue to improve our services. Please take a few minutes to complete the written survey that you may receive in the mail after your visit with us. Thank you!             Your Updated Medication List - Protect others around you: Learn how to safely use, store and throw away your medicines at www.disposemymeds.org.          This list is accurate as of: 9/28/17  3:57 PM.  Always use your most recent med list.                   Brand Name Dispense Instructions for use Diagnosis    cephALEXin 500 MG capsule    KEFLEX    56 capsule    Take 1 capsule (500 mg) by mouth every 6 hours    Cellulitis of left upper extremity       order for DME     1 each    L UE class 2 compression sleeve and gauntlet Night garment Bandaging supplies    Lymphedema of left upper extremity       tamoxifen 20 MG tablet    NOLVADEX    90 tablet    Take 1 tablet (20 mg) by mouth daily    Cancer of central portion of left breast (H)       VITAMIN D (CHOLECALCIFEROL) PO      Take 1,000 Units by mouth daily

## 2017-09-28 NOTE — NURSING NOTE
Chief Complaint   Patient presents with     Blood Draw   vpt done by RN.  Labs drawn.  VS taken.  Pt tolerated well.   Pt checked in to next appt.  Rosa Machado/Olga Hoff RN

## 2017-09-29 ENCOUNTER — TELEPHONE (OUTPATIENT)
Dept: CONSULT | Facility: CLINIC | Age: 62
End: 2017-09-29

## 2017-09-29 DIAGNOSIS — C50.912 MALIGNANT NEOPLASM OF LEFT BREAST IN FEMALE, ESTROGEN RECEPTOR POSITIVE, UNSPECIFIED SITE OF BREAST (H): Primary | ICD-10-CM

## 2017-09-29 DIAGNOSIS — Z17.0 MALIGNANT NEOPLASM OF LEFT BREAST IN FEMALE, ESTROGEN RECEPTOR POSITIVE, UNSPECIFIED SITE OF BREAST (H): Primary | ICD-10-CM

## 2017-09-29 DIAGNOSIS — Z80.3 FAMILY HISTORY OF MALIGNANT NEOPLASM OF BREAST: ICD-10-CM

## 2017-09-29 LAB — COPATH REPORT: NORMAL

## 2017-09-29 NOTE — TELEPHONE ENCOUNTER
Notified Hoda that we received prior authorization approval valid from 9/25/17 to 12/31/17. Authorization number is [id number]. Hoda expressed understanding and stated that she wants to proceed with testing. We will call when results are available. Hoda had no further questions.    Emily Ma    Division of Genetics  [facility]  P: [phone]

## 2017-10-10 ENCOUNTER — INFUSION THERAPY VISIT (OUTPATIENT)
Dept: ONCOLOGY | Facility: CLINIC | Age: 62
End: 2017-10-10
Attending: INTERNAL MEDICINE
Payer: COMMERCIAL

## 2017-10-10 ENCOUNTER — ONCOLOGY VISIT (OUTPATIENT)
Dept: ONCOLOGY | Facility: CLINIC | Age: 62
End: 2017-10-10
Attending: PHYSICIAN ASSISTANT
Payer: COMMERCIAL

## 2017-10-10 VITALS
DIASTOLIC BLOOD PRESSURE: 79 MMHG | HEIGHT: 63 IN | HEART RATE: 90 BPM | SYSTOLIC BLOOD PRESSURE: 121 MMHG | WEIGHT: 173.8 LBS | BODY MASS INDEX: 30.79 KG/M2 | TEMPERATURE: 97.8 F | RESPIRATION RATE: 16 BRPM | OXYGEN SATURATION: 97 %

## 2017-10-10 DIAGNOSIS — C50.919 METASTATIC BREAST CANCER: Primary | ICD-10-CM

## 2017-10-10 DIAGNOSIS — C50.912 MALIGNANT NEOPLASM OF LEFT FEMALE BREAST, UNSPECIFIED ESTROGEN RECEPTOR STATUS, UNSPECIFIED SITE OF BREAST (H): ICD-10-CM

## 2017-10-10 DIAGNOSIS — C50.912 MALIGNANT NEOPLASM OF LEFT FEMALE BREAST, UNSPECIFIED ESTROGEN RECEPTOR STATUS, UNSPECIFIED SITE OF BREAST (H): Primary | ICD-10-CM

## 2017-10-10 LAB
ALBUMIN SERPL-MCNC: 3.2 G/DL (ref 3.4–5)
ALP SERPL-CCNC: 45 U/L (ref 40–150)
ALT SERPL W P-5'-P-CCNC: 24 U/L (ref 0–50)
ANION GAP SERPL CALCULATED.3IONS-SCNC: 6 MMOL/L (ref 3–14)
AST SERPL W P-5'-P-CCNC: 20 U/L (ref 0–45)
BASOPHILS # BLD AUTO: 0.1 10E9/L (ref 0–0.2)
BASOPHILS NFR BLD AUTO: 0.5 %
BILIRUB SERPL-MCNC: 0.3 MG/DL (ref 0.2–1.3)
BUN SERPL-MCNC: 14 MG/DL (ref 7–30)
CALCIUM SERPL-MCNC: 8.3 MG/DL (ref 8.5–10.1)
CANCER AG27-29 SERPL-ACNC: 5 U/ML (ref 0–39)
CEA SERPL-MCNC: 0.7 UG/L (ref 0–2.5)
CHLORIDE SERPL-SCNC: 107 MMOL/L (ref 94–109)
CO2 SERPL-SCNC: 27 MMOL/L (ref 20–32)
CREAT SERPL-MCNC: 0.7 MG/DL (ref 0.52–1.04)
DIFFERENTIAL METHOD BLD: ABNORMAL
EOSINOPHIL # BLD AUTO: 0.6 10E9/L (ref 0–0.7)
EOSINOPHIL NFR BLD AUTO: 5.7 %
ERYTHROCYTE [DISTWIDTH] IN BLOOD BY AUTOMATED COUNT: 13.4 % (ref 10–15)
GFR SERPL CREATININE-BSD FRML MDRD: 85 ML/MIN/1.7M2
GLUCOSE SERPL-MCNC: 77 MG/DL (ref 70–99)
HCT VFR BLD AUTO: 36.1 % (ref 35–47)
HGB BLD-MCNC: 11.2 G/DL (ref 11.7–15.7)
IMM GRANULOCYTES # BLD: 0 10E9/L (ref 0–0.4)
IMM GRANULOCYTES NFR BLD: 0.2 %
LYMPHOCYTES # BLD AUTO: 3.1 10E9/L (ref 0.8–5.3)
LYMPHOCYTES NFR BLD AUTO: 30.8 %
MCH RBC QN AUTO: 29.6 PG (ref 26.5–33)
MCHC RBC AUTO-ENTMCNC: 31 G/DL (ref 31.5–36.5)
MCV RBC AUTO: 95 FL (ref 78–100)
MONOCYTES # BLD AUTO: 0.6 10E9/L (ref 0–1.3)
MONOCYTES NFR BLD AUTO: 5.7 %
NEUTROPHILS # BLD AUTO: 5.8 10E9/L (ref 1.6–8.3)
NEUTROPHILS NFR BLD AUTO: 57.1 %
NRBC # BLD AUTO: 0 10*3/UL
NRBC BLD AUTO-RTO: 0 /100
PLATELET # BLD AUTO: 215 10E9/L (ref 150–450)
POTASSIUM SERPL-SCNC: 3.8 MMOL/L (ref 3.4–5.3)
PROT SERPL-MCNC: 7.6 G/DL (ref 6.8–8.8)
RBC # BLD AUTO: 3.79 10E12/L (ref 3.8–5.2)
SODIUM SERPL-SCNC: 140 MMOL/L (ref 133–144)
WBC # BLD AUTO: 10.1 10E9/L (ref 4–11)

## 2017-10-10 PROCEDURE — T1013 SIGN LANG/ORAL INTERPRETER: HCPCS | Mod: U3

## 2017-10-10 PROCEDURE — 82378 CARCINOEMBRYONIC ANTIGEN: CPT | Performed by: INTERNAL MEDICINE

## 2017-10-10 PROCEDURE — 99212 OFFICE O/P EST SF 10 MIN: CPT | Mod: ZF

## 2017-10-10 PROCEDURE — 96375 TX/PRO/DX INJ NEW DRUG ADDON: CPT

## 2017-10-10 PROCEDURE — 96413 CHEMO IV INFUSION 1 HR: CPT

## 2017-10-10 PROCEDURE — 85025 COMPLETE CBC W/AUTO DIFF WBC: CPT | Performed by: INTERNAL MEDICINE

## 2017-10-10 PROCEDURE — 99214 OFFICE O/P EST MOD 30 MIN: CPT | Mod: ZP | Performed by: PHYSICIAN ASSISTANT

## 2017-10-10 PROCEDURE — S0028 INJECTION, FAMOTIDINE, 20 MG: HCPCS | Mod: ZF | Performed by: PHYSICIAN ASSISTANT

## 2017-10-10 PROCEDURE — 86300 IMMUNOASSAY TUMOR CA 15-3: CPT | Performed by: INTERNAL MEDICINE

## 2017-10-10 PROCEDURE — 25000128 H RX IP 250 OP 636: Mod: ZF | Performed by: PHYSICIAN ASSISTANT

## 2017-10-10 PROCEDURE — 25000125 ZZHC RX 250: Mod: ZF | Performed by: PHYSICIAN ASSISTANT

## 2017-10-10 PROCEDURE — 80053 COMPREHEN METABOLIC PANEL: CPT | Performed by: INTERNAL MEDICINE

## 2017-10-10 RX ORDER — SODIUM CHLORIDE 9 MG/ML
1000 INJECTION, SOLUTION INTRAVENOUS CONTINUOUS PRN
Status: CANCELLED
Start: 2017-10-10

## 2017-10-10 RX ORDER — MEPERIDINE HYDROCHLORIDE 25 MG/ML
25 INJECTION INTRAMUSCULAR; INTRAVENOUS; SUBCUTANEOUS EVERY 30 MIN PRN
Status: CANCELLED | OUTPATIENT
Start: 2017-10-10

## 2017-10-10 RX ORDER — EPINEPHRINE 0.3 MG/.3ML
0.3 INJECTION SUBCUTANEOUS EVERY 5 MIN PRN
Status: CANCELLED | OUTPATIENT
Start: 2017-10-10

## 2017-10-10 RX ORDER — HEPARIN SODIUM (PORCINE) LOCK FLUSH IV SOLN 100 UNIT/ML 100 UNIT/ML
5 SOLUTION INTRAVENOUS EVERY 8 HOURS
Status: CANCELLED
Start: 2017-10-10

## 2017-10-10 RX ORDER — HEPARIN SODIUM (PORCINE) LOCK FLUSH IV SOLN 100 UNIT/ML 100 UNIT/ML
5 SOLUTION INTRAVENOUS ONCE
Status: COMPLETED | OUTPATIENT
Start: 2017-10-10 | End: 2017-10-10

## 2017-10-10 RX ORDER — EPINEPHRINE 1 MG/ML
0.3 INJECTION, SOLUTION, CONCENTRATE INTRAVENOUS EVERY 5 MIN PRN
Status: CANCELLED | OUTPATIENT
Start: 2017-10-10

## 2017-10-10 RX ORDER — LORAZEPAM 2 MG/ML
0.5 INJECTION INTRAMUSCULAR EVERY 4 HOURS PRN
Status: CANCELLED
Start: 2017-10-10

## 2017-10-10 RX ORDER — DIPHENHYDRAMINE HCL 25 MG
50 CAPSULE ORAL ONCE
Status: CANCELLED
Start: 2017-10-10

## 2017-10-10 RX ORDER — ACETAMINOPHEN 325 MG/1
650 TABLET ORAL
Status: CANCELLED | OUTPATIENT
Start: 2017-10-10

## 2017-10-10 RX ORDER — METHYLPREDNISOLONE SODIUM SUCCINATE 125 MG/2ML
125 INJECTION, POWDER, LYOPHILIZED, FOR SOLUTION INTRAMUSCULAR; INTRAVENOUS
Status: CANCELLED
Start: 2017-10-10

## 2017-10-10 RX ORDER — HEPARIN SODIUM (PORCINE) LOCK FLUSH IV SOLN 100 UNIT/ML 100 UNIT/ML
5 SOLUTION INTRAVENOUS EVERY 8 HOURS
Status: DISCONTINUED | OUTPATIENT
Start: 2017-10-10 | End: 2017-10-10 | Stop reason: HOSPADM

## 2017-10-10 RX ORDER — ALBUTEROL SULFATE 0.83 MG/ML
2.5 SOLUTION RESPIRATORY (INHALATION)
Status: CANCELLED | OUTPATIENT
Start: 2017-10-10

## 2017-10-10 RX ORDER — DIPHENHYDRAMINE HYDROCHLORIDE 50 MG/ML
50 INJECTION INTRAMUSCULAR; INTRAVENOUS
Status: CANCELLED
Start: 2017-10-10

## 2017-10-10 RX ORDER — ALBUTEROL SULFATE 90 UG/1
1-2 AEROSOL, METERED RESPIRATORY (INHALATION)
Status: CANCELLED
Start: 2017-10-10

## 2017-10-10 RX ADMIN — FAMOTIDINE 20 MG: 20 INJECTION, SOLUTION INTRAVENOUS at 14:13

## 2017-10-10 RX ADMIN — SODIUM CHLORIDE 250 ML: 9 INJECTION, SOLUTION INTRAVENOUS at 14:13

## 2017-10-10 RX ADMIN — SODIUM CHLORIDE, PRESERVATIVE FREE 5 ML: 5 INJECTION INTRAVENOUS at 15:01

## 2017-10-10 RX ADMIN — TRASTUZUMAB 478 MG: 150 INJECTION, POWDER, LYOPHILIZED, FOR SOLUTION INTRAVENOUS at 14:29

## 2017-10-10 RX ADMIN — SODIUM CHLORIDE, PRESERVATIVE FREE 5 ML: 5 INJECTION INTRAVENOUS at 13:06

## 2017-10-10 ASSESSMENT — PAIN SCALES - GENERAL: PAINLEVEL: NO PAIN (0)

## 2017-10-10 NOTE — MR AVS SNAPSHOT
After Visit Summary   10/10/2017    Hoda Brush    MRN: [id number]           Patient Information     Date Of Birth          1955        Visit Information        Provider Department      10/10/2017 2:00 PM Sheeba Cifuentes;  15 ATC;  ONCOLOGY INFUSION  Services Department        Today's Diagnoses     Malignant neoplasm of left female breast, unspecified estrogen receptor status, unspecified site of breast (H)    -  1      Care Instructions    Contact Numbers  [facility] Main Line/After Hours: [phone]  [facility] Triage line: [phone]      Please call the triage or after hours line if you experience a temperature greater than or equal to 100.5, shaking chills, have uncontrolled nausea, vomiting and/or diarrhea, dizziness, shortness of breath, chest pain, bleeding, unexplained bruising, or if you have any other new/concerning symptoms, questions or concerns.      If you are having any concerning symptoms or wish to speak to a provider before your next infusion visit, please call to notify us so we can adequately serve you.     If you need a refill on a narcotic prescription or other medication, please call before your infusion appointment.                 October 2017 Sunday Monday Tuesday Wednesday Thursday Friday Saturday   1     2     3     4     5     6     7       8     9     10     [facility] [place] [facility]RAW   12:30 PM   (30 min.)   [facility]   [facility] Draw     [facility] RETURN   12:45 PM   (90 min.)   Anne Lea PA   [facility] ED ONC INFUSION 60    2:00 PM   (60 min.)    ONCOLOGY INFUSION   [facility] 11     12     13     14       15     16     17     18     19     20     21       22     23     24     25     26     27     28       29     30     31     [facility] [place] [facility] DRAW    1:30 PM   (15 min.)    [facility] DRAW   [facility] Lab Draw     [facility] RETURN    1:45 PM   (30 min.)   Lisandro Aguiar  MD Meng   [facility]     [facility] ONC INFUSION 60    3:00 PM   (60 min.)   [facility] ONCOLOGY INFUSION   [facility]                                November 2017 Sunday Monday Tuesday Wednesday Thursday Friday Saturday                  1     2     3     [facility] RETURN WITH ROOM    1:00 PM   (60 min.)   Lianne Saavedra GC   [facility] 4       5     6     7     8     9     10     11       12     13     14     15     16     17     18       19     20     21     22     23     24     25       26     27     28     29     30                            Lab Results:  Recent Results (from the past 12 hour(s))   CBC with platelets differential    Collection Time: 10/10/17  1:13 PM   Result Value Ref Range    WBC 10.1 4.0 - 11.0 10e9/L    RBC Count 3.79 (L) 3.8 - 5.2 10e12/L    Hemoglobin 11.2 (L) 11.7 - 15.7 g/dL    Hematocrit 36.1 35.0 - 47.0 %    MCV 95 78 - 100 fl    MCH 29.6 26.5 - 33.0 pg    MCHC 31.0 (L) 31.5 - 36.5 g/dL    RDW 13.4 10.0 - 15.0 %    Platelet Count 215 150 - 450 10e9/L    Diff Method Automated Method     % Neutrophils 57.1 %    % Lymphocytes 30.8 %    % Monocytes 5.7 %    % Eosinophils 5.7 %    % Basophils 0.5 %    % Immature Granulocytes 0.2 %    Nucleated RBCs 0 0 /100    Absolute Neutrophil 5.8 1.6 - 8.3 10e9/L    Absolute Lymphocytes 3.1 0.8 - 5.3 10e9/L    Absolute Monocytes 0.6 0.0 - 1.3 10e9/L    Absolute Eosinophils 0.6 0.0 - 0.7 10e9/L    Absolute Basophils 0.1 0.0 - 0.2 10e9/L    Abs Immature Granulocytes 0.0 0 - 0.4 10e9/L    Absolute Nucleated RBC 0.0    Comprehensive metabolic panel    Collection Time: 10/10/17  1:13 PM   Result Value Ref Range    Sodium 140 133 - 144 mmol/L    Potassium 3.8 3.4 - 5.3 mmol/L    Chloride 107 94 - 109 mmol/L    Carbon Dioxide 27 20 - 32 mmol/L    Anion Gap 6 3 - 14 mmol/L    Glucose 77 70 - 99 mg/dL    Urea Nitrogen 14 7 - 30 mg/dL    Creatinine 0.70 0.52 - 1.04 mg/dL    GFR Estimate 85 >60 mL/min/1.7m2    GFR  Estimate If Black >90 >60 mL/min/1.7m2    Calcium 8.3 (L) 8.5 - 10.1 mg/dL    Bilirubin Total 0.3 0.2 - 1.3 mg/dL    Albumin 3.2 (L) 3.4 - 5.0 g/dL    Protein Total 7.6 6.8 - 8.8 g/dL    Alkaline Phosphatase 45 40 - 150 U/L    ALT 24 0 - 50 U/L    AST 20 0 - 45 U/L               Follow-ups after your visit        Your next 10 appointments already scheduled     Oct 31, 2017  1:30 PM CDT   [place] [place] [facility]   [facility] Lab Draw ([facility])    [address] [place] [address] [id number] [phone]            Oct 31, 2017  2:00 PM CDT   (Arrive by 1:45 PM)   Return Visit with Lisandro Aguiar MD   [facility] ([facility])    [address] [place] [id number] [phone]            Oct 31, 2017  3:00 PM CDT   Infusion 60 with  ONCOLOGY INFUSION,  15 ATC   [facility] ([facility])    [address] [place] [address] [phone] [phone]            Nov 03, 2017  1:15 PM CDT   (Arrive by 1:00 PM)   RETURN WITH ROOM with Lianne Saavedra GC,  2 119 CONSULT RM   [facility] ([facility])    [address] [place] [address] [id number] [phone]              Who to contact     If you have questions or need follow up information about today's clinic visit or your schedule please contact [facility] directly at [phone].  Normal or non-critical lab and imaging results will be communicated to you by MyChart, letter or phone within 4 business days after the clinic has received the results. If you do not hear from us within 7 days, please contact the clinic through MyChart or phone. If you have a critical or abnormal lab result, we will notify you by phone as soon as possible.  Submit refill requests through [employer] or call your  pharmacy and they will forward the refill request to us. Please allow 3 business days for your refill to be completed.          Additional Information About Your Visit        [employer] Information     [employer] gives you secure access to your electronic health record. If you see a primary care provider, you can also send messages to your care team and make appointments. If you have questions, please call your primary care clinic.  If you do not have a primary care provider, please call [phone] and they will assist you.        Care EveryWhere ID     This is your Care EveryWhere ID. This could be used by other organizations to access your [facility] medical records  [id number]         Blood Pressure from Last 3 Encounters:   10/10/17 121/79   09/28/17 130/79   09/21/17 118/56    Weight from Last 3 Encounters:   10/10/17 78.8 kg (173 lb 12.8 oz)   09/28/17 78.8 kg (173 lb 12.8 oz)   09/20/17 79.4 kg (175 lb 1.6 oz)              We Performed the Following     CA 27.29 Breast tumor marker     CBC with platelets differential     CEA     Comprehensive metabolic panel        Primary Care Provider    None Specified       No primary provider on file.        Equal Access to Services     NOE VAZQUEZ : Hadarturo Ramsey, ro jefferson, jamal wilkins, alexa angulo . So [facility] [phone].    ATENCIÓN: Si habla español, tiene a lagunas disposición servicios gratuitos de asistencia lingüística. Llame al [phone].    We comply with applicable federal civil rights laws and [place] laws. We do not discriminate on the basis of race, color, national origin, age, disability, sex, sexual orientation, or gender identity.            Thank you!     Thank you for choosing [facility]  for your care. Our goal is always to provide you with excellent care. Hearing back from our patients is one way we can continue to improve our services. Please take a few minutes to  complete the written survey that you may receive in the mail after your visit with us. Thank you!             Your Updated Medication List - Protect others around you: Learn how to safely use, store and throw away your medicines at www.disposemymeds.org.          This list is accurate as of: 10/10/17  2:35 PM.  Always use your most recent med list.                   Brand Name Dispense Instructions for use Diagnosis    cephALEXin 500 MG capsule    KEFLEX    56 capsule    Take 1 capsule (500 mg) by mouth every 6 hours    Cellulitis of left upper extremity       order for DME     1 each    L UE class 2 compression sleeve and gauntlet Night garment Bandaging supplies    Lymphedema of left upper extremity       tamoxifen 20 MG tablet    NOLVADEX    90 tablet    Take 1 tablet (20 mg) by mouth daily    Cancer of central portion of left breast (H)       VITAMIN D (CHOLECALCIFEROL) PO      Take 1,000 Units by mouth daily

## 2017-10-10 NOTE — NURSING NOTE
"Oncology Rooming Note    October 10, 2017 1:18 PM   Hoda Brush is a 62 year old female who presents for:    Chief Complaint   Patient presents with     Oncology Clinic Visit     Return: Breast CA     Initial Vitals: /79 (BP Location: Right arm, Cuff Size: Adult Regular)  Pulse 90  Temp 97.8  F (36.6  C) (Oral)  Resp 16  Ht 1.6 m (5' 2.99\")  Wt 78.8 kg (173 lb 12.8 oz)  SpO2 97%  BMI 30.8 kg/m2 Estimated body mass index is 30.8 kg/(m^2) as calculated from the following:    Height as of this encounter: 1.6 m (5' 2.99\").    Weight as of this encounter: 78.8 kg (173 lb 12.8 oz). Body surface area is 1.87 meters squared.  No Pain (0) Comment: Data Unavailable   No LMP recorded. Patient is postmenopausal.  Allergies reviewed: Yes  Medications reviewed: Yes    Medications: Medication refills not needed today.  Pharmacy name entered into [employer]:    [id number] [place] [facility] - [place], [place] - [address] [id number] - [place], [place] - [address] AT [facility] [address] [place], [place] [id number] - [place], [place] - [address] AT  [address] [place] [facility]    Clinical concerns: no new concerns     6 minutes for nursing intake (face to face time)     Bouchra Guerrero CMA                "

## 2017-10-10 NOTE — LETTER
"10/10/2017      RE: Hoda Brush  [address] [address]       Hematology-Oncology Visit  Oct 10, 2017    Reason for Visit: follow-up metastatic breast cancer     HPI: Hoda Brush is a 62 year old female with metastatic ER positive, HER 2 positive left breast cancer with bone mets. She recently came to the  as a refugee and established care with Dr. Aguiar. She resumed treatment with Herceptin and is on daily tamoxifen. She is on Xgeva as well. Her last dose of Herceptin was on 9/19. She was admitted 9/20-9/21 with LUE cellulitis with leukocytosis. Given a dose of IV Ancef and discharged on keflex.    She presents prior to her next dose of Herceptin.      Interval History: Hoda is here with a professional Tajik  and feels well. She has no concerns today. She denies any further erythema or pain from left arm. Has very minimal edema. She has not started lymphedema yet. No fevers/chills. She is eating and drinking well. No nausea or bowel changes. No rash, cough, SOB, CP, lower leg edema. ROS otherwise negative.     Current Outpatient Prescriptions   Medication     VITAMIN D, CHOLECALCIFEROL, PO     tamoxifen (NOLVADEX) 20 MG tablet     cephALEXin (KEFLEX) 500 MG capsule     order for DME     No current facility-administered medications for this visit.      Facility-Administered Medications Ordered in Other Visits   Medication     sodium chloride (PF) 0.9% PF flush 10 mL     heparin 100 UNIT/ML injection 5 mL     0.9% sodium chloride BOLUS       PHYSICAL EXAM:  /79 (BP Location: Right arm, Cuff Size: Adult Regular)  Pulse 90  Temp 97.8  F (36.6  C) (Oral)  Resp 16  Ht 1.6 m (5' 2.99\")  Wt 78.8 kg (173 lb 12.8 oz)  SpO2 97%  BMI 30.8 kg/m2  General: Alert, oriented, pleasant, NAD  HEENT: NC/AT, no scleral icterus. MMM, no lesions or thrush  Neck: Supple, no cervical or supraclavicular adenopathy.  Axillary: No axillary adenopathy.   Lungs: CTA bilaterally, " normal work of breathing  Cardiac: RRR, S1, S2, no murmurs  Abdomen: Soft, nontender, nondistended, + BS, no palpable masses  Extremities: Trace pedal edema.   Skin: Left arm has very minimal edema compared to R. No skin changes.    Labs:    10/10/2017 13:13   WBC 10.1   Hemoglobin 11.2 (L)   Hematocrit 36.1   Platelet Count 215   RBC Count 3.79 (L)   MCV 95   MCH 29.6   MCHC 31.0 (L)   RDW 13.4   Diff Method Automated Method   % Neutrophils 57.1   % Lymphocytes 30.8   % Monocytes 5.7   % Eosinophils 5.7   % Basophils 0.5   % Immature Granulocytes 0.2   Nucleated RBCs 0   Absolute Neutrophil 5.8   Absolute Lymphocytes 3.1   Absolute Monocytes 0.6   Absolute Eosinophils 0.6   Absolute Basophils 0.1   Abs Immature Granulocytes 0.0   Absolute Nucleated RBC 0.0         Assessment & Plan:     1. Metastatic breast cancer, ER, HER2+ positive: Was last treated in [facility] with Herceptin. We have continued Herceptin every 3 weeks as well as daily tamoxifen. She tolerates treatment extremely well without overt symptoms. She brought in astragalus which I recommended she does not take due to estrogenic potential. Follow-up with Dr. Aguiar in 3 weeks with next Herceptin. If she is doing well, can likely space out provider visits.     2. Bone metastasis: Continue xgeva every 6 weeks. Due at next visit. Take calcium 1200 mg daily. She is on high dose vitamin D3 5000 IU daily per direction of Dr. Aguiar.     3. Left upper extremity cellulitis: Resolved following antibiotics. Start lymphedema therapy anytime.       Anne Lea PA-C    [facility] [address] [address]  [phone]

## 2017-10-10 NOTE — PROGRESS NOTES
"Hematology-Oncology Visit  Oct 10, 2017    Reason for Visit: follow-up metastatic breast cancer     HPI: Hoda Brush is a 62 year old female with metastatic ER positive, HER 2 positive left breast cancer with bone mets. She recently came to the  as a refugee and established care with Dr. Aguiar. She resumed treatment with Herceptin and is on daily tamoxifen. She is on Xgeva as well. Her last dose of Herceptin was on 9/19. She was admitted 9/20-9/21 with LUE cellulitis with leukocytosis. Given a dose of IV Ancef and discharged on keflex.    She presents prior to her next dose of Herceptin.      Interval History: Hoda is here with a professional Citizen of [place]  and feels well. She has no concerns today. She denies any further erythema or pain from left arm. Has very minimal edema. She has not started lymphedema yet. No fevers/chills. She is eating and drinking well. No nausea or bowel changes. No rash, cough, SOB, CP, lower leg edema. ROS otherwise negative.     Current Outpatient Prescriptions   Medication     VITAMIN D, CHOLECALCIFEROL, PO     tamoxifen (NOLVADEX) 20 MG tablet     cephALEXin (KEFLEX) 500 MG capsule     order for DME     No current facility-administered medications for this visit.      Facility-Administered Medications Ordered in Other Visits   Medication     sodium chloride (PF) 0.9% PF flush 10 mL     heparin 100 UNIT/ML injection 5 mL     0.9% sodium chloride BOLUS       PHYSICAL EXAM:  /79 (BP Location: Right arm, Cuff Size: Adult Regular)  Pulse 90  Temp 97.8  F (36.6  C) (Oral)  Resp 16  Ht 1.6 m (5' 2.99\")  Wt 78.8 kg (173 lb 12.8 oz)  SpO2 97%  BMI 30.8 kg/m2  General: Alert, oriented, pleasant, NAD  HEENT: NC/AT, no scleral icterus. MMM, no lesions or thrush  Neck: Supple, no cervical or supraclavicular adenopathy.  Axillary: No axillary adenopathy.   Lungs: CTA bilaterally, normal work of breathing  Cardiac: RRR, S1, S2, no murmurs  Abdomen: Soft, nontender, " nondistended, + BS, no palpable masses  Extremities: Trace pedal edema.   Skin: Left arm has very minimal edema compared to R. No skin changes.    Labs:    10/10/2017 13:13   WBC 10.1   Hemoglobin 11.2 (L)   Hematocrit 36.1   Platelet Count 215   RBC Count 3.79 (L)   MCV 95   MCH 29.6   MCHC 31.0 (L)   RDW 13.4   Diff Method Automated Method   % Neutrophils 57.1   % Lymphocytes 30.8   % Monocytes 5.7   % Eosinophils 5.7   % Basophils 0.5   % Immature Granulocytes 0.2   Nucleated RBCs 0   Absolute Neutrophil 5.8   Absolute Lymphocytes 3.1   Absolute Monocytes 0.6   Absolute Eosinophils 0.6   Absolute Basophils 0.1   Abs Immature Granulocytes 0.0   Absolute Nucleated RBC 0.0         Assessment & Plan:     1. Metastatic breast cancer, ER, HER2+ positive: Was last treated in [facility] with Herceptin. We have continued Herceptin every 3 weeks as well as daily tamoxifen. She tolerates treatment extremely well without overt symptoms. She brought in astragalus which I recommended she does not take due to estrogenic potential. Follow-up with Dr. Aguiar in 3 weeks with next Herceptin. If she is doing well, can likely space out provider visits.     2. Bone metastasis: Continue xgeva every 6 weeks. Due at next visit. Take calcium 1200 mg daily. She is on high dose vitamin D3 5000 IU daily per direction of Dr. Aguiar.     3. Left upper extremity cellulitis: Resolved following antibiotics. Start lymphedema therapy anytime.       Anne Lea PA-C    [facility]  [address] [place] [id number]  [phone]

## 2017-10-10 NOTE — PROGRESS NOTES
Infusion Nursing Note:  Pt presents today for C2 D1 Herceptin.     present during visit today: Yes, Language: [place] Islander.   Patient seen by SEMAJ Crockett prior to infusion.    Lab Results   Component Value Date    HGB 11.2 10/10/2017     Lab Results   Component Value Date    WBC 10.1 10/10/2017      Lab Results   Component Value Date    ANEU 5.8 10/10/2017     Lab Results   Component Value Date     10/10/2017      Lab Results   Component Value Date     10/10/2017                   Lab Results   Component Value Date    POTASSIUM 3.8 10/10/2017           No results found for: MAG         Lab Results   Component Value Date    CR 0.70 10/10/2017                   Lab Results   Component Value Date    TAYLER 8.3 10/10/2017                Lab Results   Component Value Date    BILITOTAL 0.3 10/10/2017           Lab Results   Component Value Date    ALBUMIN 3.2 10/10/2017                    Lab Results   Component Value Date    ALT 24 10/10/2017           Lab Results   Component Value Date    AST 20 10/10/2017     Results reviewed, labs MET treatment parameters.    Decadron, benadryl, and pepcid ordered for pre-meds. Per Dr. Aguiar on 9/19/2017 (see infusion progress note)- OK to give Herceptin without some premeds today if infusion tolerated ok on 9/19/2017. SEMAJ Crockett paged to update.    TOR 10/10/2017 @ 1370 SEMAJ Crockett/Cash Pablo RN  --OK to give only pepcid today for a pre-med    Proceed with treatment.    Intravenous Access:  Implanted Port.    Post Infusion Assessment:  Patient tolerated infusion without incident.  Blood return noted pre and post infusion.  Port flushed and dc'd intact at end of infusion.    Discharge Plan:   Patient declined prescription refills.  Discharge instructions reviewed with: Patient.  Patient and/or family verbalized understanding of discharge instructions and all questions answered.  Copy of AVS reviewed with patient and/or family.  Pt  will return 10/31 for next provider and infusion appts.

## 2017-10-10 NOTE — PATIENT INSTRUCTIONS
Contact Numbers  [facility] Main Line/After Hours: [phone]  [facility] Triage line: [phone]      Please call the triage or after hours line if you experience a temperature greater than or equal to 100.5, shaking chills, have uncontrolled nausea, vomiting and/or diarrhea, dizziness, shortness of breath, chest pain, bleeding, unexplained bruising, or if you have any other new/concerning symptoms, questions or concerns.      If you are having any concerning symptoms or wish to speak to a provider before your next infusion visit, please call to notify us so we can adequately serve you.     If you need a refill on a narcotic prescription or other medication, please call before your infusion appointment.                 October 2017 Sunday Monday Tuesday Wednesday Thursday Friday Saturday   1     2     3     4     5     6     7       8     9     10     [facility] [place] [facility]   12:30 PM   (30 min.)    [place] [facility]   [facility] Lab Draw     [facility] RETURN   12:45 PM   (90 min.)   Anne Lea PA   [facility]    2:00 PM   (60 min.)    ONCOLOGY INFUSION   [facility] 11     12     13     14       15     16     17     18     19     20     21       22     23     24     25     26     27     28       29     30     31     [facility] [place] [facility]    1:30 PM   (15 min.)    [place] D[facility]   [facility] Lab Draw     [facility] RETURN    1:45 PM   (30 min.)   Lisandro Aguiar MD   [facility]     [facility] ONC INFUSION 60    3:00 PM   (60 min.)    ONCOLOGY INFUSION   [facility]                                November 2017 Sunday Monday Tuesday Wednesday Thursday Friday Saturday                  1     2     3     [facility] RETURN WITH ROOM    1:00 PM   (60 min.)   Lianne Saavedra GC   [facility] 4       5     6     7     8     9     10     11       12     13     14     15     16     17     18        19     20     21     22     23     24     25       26     27     28     29     30                            Lab Results:  Recent Results (from the past 12 hour(s))   CBC with platelets differential    Collection Time: 10/10/17  1:13 PM   Result Value Ref Range    WBC 10.1 4.0 - 11.0 10e9/L    RBC Count 3.79 (L) 3.8 - 5.2 10e12/L    Hemoglobin 11.2 (L) 11.7 - 15.7 g/dL    Hematocrit 36.1 35.0 - 47.0 %    MCV 95 78 - 100 fl    MCH 29.6 26.5 - 33.0 pg    MCHC 31.0 (L) 31.5 - 36.5 g/dL    RDW 13.4 10.0 - 15.0 %    Platelet Count 215 150 - 450 10e9/L    Diff Method Automated Method     % Neutrophils 57.1 %    % Lymphocytes 30.8 %    % Monocytes 5.7 %    % Eosinophils 5.7 %    % Basophils 0.5 %    % Immature Granulocytes 0.2 %    Nucleated RBCs 0 0 /100    Absolute Neutrophil 5.8 1.6 - 8.3 10e9/L    Absolute Lymphocytes 3.1 0.8 - 5.3 10e9/L    Absolute Monocytes 0.6 0.0 - 1.3 10e9/L    Absolute Eosinophils 0.6 0.0 - 0.7 10e9/L    Absolute Basophils 0.1 0.0 - 0.2 10e9/L    Abs Immature Granulocytes 0.0 0 - 0.4 10e9/L    Absolute Nucleated RBC 0.0    Comprehensive metabolic panel    Collection Time: 10/10/17  1:13 PM   Result Value Ref Range    Sodium 140 133 - 144 mmol/L    Potassium 3.8 3.4 - 5.3 mmol/L    Chloride 107 94 - 109 mmol/L    Carbon Dioxide 27 20 - 32 mmol/L    Anion Gap 6 3 - 14 mmol/L    Glucose 77 70 - 99 mg/dL    Urea Nitrogen 14 7 - 30 mg/dL    Creatinine 0.70 0.52 - 1.04 mg/dL    GFR Estimate 85 >60 mL/min/1.7m2    GFR Estimate If Black >90 >60 mL/min/1.7m2    Calcium 8.3 (L) 8.5 - 10.1 mg/dL    Bilirubin Total 0.3 0.2 - 1.3 mg/dL    Albumin 3.2 (L) 3.4 - 5.0 g/dL    Protein Total 7.6 6.8 - 8.8 g/dL    Alkaline Phosphatase 45 40 - 150 U/L    ALT 24 0 - 50 U/L    AST 20 0 - 45 U/L

## 2017-10-10 NOTE — MR AVS SNAPSHOT
After Visit Summary   10/10/2017    Hoda Brush    MRN: [id number]           Patient Information     Date Of Birth          1955        Visit Information        Provider Department      10/10/2017 12:45 PM Sheeba Cifeuntes; Anne Lea PA [facility]        Today's Diagnoses     Metastatic breast cancer (H)    -  1    Malignant neoplasm of left female breast, unspecified estrogen receptor status, unspecified site of breast (H)           Follow-ups after your visit        Your next 10 appointments already scheduled     Oct 10, 2017  2:00 PM CDT   Infusion 60 with UC ONCOLOGY INFUSION, UC 15 ATC   [facility] ([facility])    [address] [place] [address] [id number] [phone]            Oct 31, 2017  1:30 PM CDT   [facility] Draw with  [facility] DRAW   [facility] ([facility])    [address] [place] [address] [id number] [phone]            Oct 31, 2017  2:00 PM CDT   (Arrive by 1:45 PM)   Return Visit with Lisandro Aguiar MD   [facility] ([facility])    [address] [place] [address] [id number] [phone]            Oct 31, 2017  3:00 PM CDT   Infusion 60 with UC ONCOLOGY INFUSION, UC 15 ATC   [facility] ([facility])    [address] [place] [address] [id number] [phone]            Nov 03, 2017  1:15 PM CDT   (Arrive by 1:00 PM)   RETURN WITH ROOM with Lianne Saavedra GC,  2 119 CONSULT RM   [facility] ([facility])    [address] [place] [address] [id number] [phone]              Who to contact     If you have questions or need follow up information about today's clinic visit or your schedule please contact [facility] directly at [phone].  Normal or non-critical lab and imaging results will be communicated to you by MyChart, letter or phone within 4 business days after the clinic has received the results. If you do not hear from us within 7 days, please contact the clinic through [employer] or phone. If you have a critical or abnormal lab result, we will notify you by phone as soon as possible.  Submit refill requests through [employer] or call your pharmacy and they will forward the refill request to us. Please allow 3 business days for your refill to be completed.          Additional Information About Your Visit        [employer] Information     [employer] gives you secure access to your electronic health record. If you see a primary care provider, you can also send messages to your care team and make appointments. If you have questions, please call your primary care clinic.  If you do not have a primary care provider, please call [phone] and they will assist you.        Care EveryWhere ID     This is your Care EveryWhere ID. This could be used by other organizations to access your [place] medical records  [id number]        Your Vitals Were     Pulse Temperature Respirations Height Pulse Oximetry BMI (Body Mass Index)    90 97.8  F (36.6  C) (Oral) 16 1.6 m (5' 2.99\") 97% 30.8 kg/m2       Blood Pressure from Last 3 Encounters:   10/10/17 121/79   09/28/17 130/79   09/21/17 118/56    Weight from Last 3 Encounters:   10/10/17 78.8 kg (173 lb 12.8 oz)   09/28/17 78.8 kg (173 lb 12.8 oz)   09/20/17 79.4 kg (175 lb 1.6 oz)              Today, you had the following     No orders found for display       Primary Care Provider    None Specified       No primary provider on file.        Equal Access to Services     NOE VAZQUEZ : Luz Marina Ramsey, ro jefferson, alexa lopez. So [facility] [phone].    ATENCIÓN: Si susana esposorio, sekou a lagunas " disposición servicios gratuitos de asistencia lingüística. Luis joshi [phone].    We comply with applicable federal civil rights laws and [place] laws. We do not discriminate on the basis of race, color, national origin, age, disability, sex, sexual orientation, or gender identity.            Thank you!     Thank you for choosing [facility]  for your care. Our goal is always to provide you with excellent care. Hearing back from our patients is one way we can continue to improve our services. Please take a few minutes to complete the written survey that you may receive in the mail after your visit with us. Thank you!             Your Updated Medication List - Protect others around you: Learn how to safely use, store and throw away your medicines at www.disposemymeds.org.          This list is accurate as of: 10/10/17  1:50 PM.  Always use your most recent med list.                   Brand Name Dispense Instructions for use Diagnosis    cephALEXin 500 MG capsule    KEFLEX    56 capsule    Take 1 capsule (500 mg) by mouth every 6 hours    Cellulitis of left upper extremity       order for DME     1 each    L UE class 2 compression sleeve and gauntlet Night garment Bandaging supplies    Lymphedema of left upper extremity       tamoxifen 20 MG tablet    NOLVADEX    90 tablet    Take 1 tablet (20 mg) by mouth daily    Cancer of central portion of left breast (H)       VITAMIN D (CHOLECALCIFEROL) PO      Take 1,000 Units by mouth daily

## 2017-10-10 NOTE — NURSING NOTE
"Chief Complaint   Patient presents with     Oncology Clinic Visit     Return: Breast CA     Port Draw     Labs drawn from port by RN. Line flushed with saline and heparin. Vitals taken and pt checked in for appt     Port accessed with 20g 3/4\" gripper needle by RN, labs collected, line flushed with saline and heparin.  Vitals taken. Pt checked in for appointment(s).    Emily Munoz RN  "

## 2017-10-29 NOTE — PROGRESS NOTES
Hoda is seen with her daughter, Ashleigh, today for a new patient visit.  The patient is a refugee from [facility] and is here for continuation of care for her metastatic ER+HER2- breast cancer.  She is a [employer] refugee from [facility] and was seen in clinic with her daughter.  The only data we have from [facility] is an English translation of her records.       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  On 02/11/2014, she was seen by an oncologist.  The clinical staging of T4b N2 M1 was noted.   Her breast cancer was on the left side. There was no right breast cancer, confirmed by the patient and her daughter, correcting a possible error in the translated records.  ER was positive in 100% of the cells, DC at 14% and HER2 was 3+ positive.  It appears that this histopathologic information is on the mastectomy specimen. She underwent an MRI for staging of presumptive bone metastases which was performed 03/02/2014.  There were skeletal metastases in the thoracic vertebrae at 12, L1, L3, L5 and S1 vertebral body, ranging in size from 0.7-3.0 cm in size.  Ischial and right femur were also involved, as well as a large iliac mass measuring about 15 cm in the uterus. 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   Radiation Oncology consultation was performed 03/11/2014, and she was given radiation in 1 dose of 6 Gy to the right iliac lesion.        With the initial diagnosis, she initiated treatment with 6 cycles of CAF neoadjuvant chemotherapy 02/14, 07/07, 03/28, 04/18/14, 05/08 and 05/29/14. She also received monthly zoledronic acid.  She then underwent a modified left mastectomy of Palacios type and left lymphadenopathy on 06/27/2014.   Pathologic examination showed number at [facility] was [id number]/14 showed infiltrative grade 2 ductal cancer with 6 level 1 metastatic lymph nodes and 3 level 2 metastatic lymph nodes.  The differentiation was  intermediate.  The tumor was ER positive 70% of the cells, KY positive in 40% of the cells and HER2 was 3+ by immunohistochemistry and the Ki-67 labeling index was 20%. Her staging after surgery was stage IV, pT4b N2 M1.  I don't see a biopsy of the skeletal metastases.  She then had continuation with chemotherapy with 4 cycles of Herceptin and taxane with montly zoledronic acid.  Tamoxifen was initiated.  She then had two years of Herceptin and a decision was made not to continue further Herceptin.  She continued on zoledronic acid every 3 months. She was clinically stable. [place] then [place] the [place] new [place] from [facility].  She arrived last month, established [place] residency and now seeks further oncology care.       Overall, Hoda has been doing reasonably well.  She has medical assistance from the [facility].     She was admitted 9/20-9/21 with LUE cellulitis with leukocytosis. Given a dose of IV Ancef and discharged on keflex.    INTERVAL HISTORY:  Hoda returns to clinic and has been doing quite well.  She did have an infection in the left arm requiring antibiotics.  This has now resolved.  She does have left arm lymphedema and wants to be referred to the Lymphedema Clinic.  The port was not affected by the infection.  She has no pain, no fatigue, no depression, and no anxiety.  A 10-point review of systems is entirely negative.  She continues on tamoxifen and has been tolerated it quite well.       PHYSICAL EXAMINATION:   VITAL SIGNS:  Blood pressure 126/78, temperature 98.4, pulse 92, respirations 16, O2 sat 98% on room air, weight 79.2 kg.   GENERAL:  Hoda appeared generally well.     HEENT:  She has no alopecia.  Examination of the oropharynx is without lesions.   LYMPH:  There is no palpable cervical, supraclavicular, subclavicular or axillary lymphadenopathy.     CHEST:  Port site is clean and dry without erythema or tenderness.   BREASTS:  Breast exam was not performed today.    LUNGS:  Clear to percussion and auscultation.   HEART:  There is a regular rate and rhythm, S1, S2.   ABDOMEN:  Soft, nontender, without hepatosplenomegaly.   EXTREMITIES:  Remarkable for left arm edema.  Right arm appeared normal.   PSYCHIATRIC:  Mood and affect were normal.        LABORATORY DATA:  CMP is remarkable for calcium 8.1 and albumin slightly low at 3.2.  The hemoglobin was 11.3.  The remainder of the CBC was normal.  The CEA has been 0.7 and the CA 27-29 is 5.  We will not follow these further.       ASSESSMENT AND PLAN:     1.  Hoda Brush is a 62-year-old woman from [facility] but formerly from [facility] who came to live in the [place]  She lives with her daughter and is here for continuation of every 3-week Herceptin and tamoxifen.  Her tumor is ER-positive, SD-positive, HER2-positive.  She has metastatic breast cancer with bone-only metastases by report.  She presented and was diagnosed with metastatic breast cancer in approximately 02/2014, when she was diagnosed with a T4 N2 M1, invasive ductal carcinoma of the left breast.  The pathology report from [facility] shows ER-positive in 70% of the cells, SD-positive in 40% of the cells, and HER2/kenia was 3+ positive by immunohistochemistry.  Ki-67 was 20%.  She has no evidence of metastatic disease on PET/CT imaging except for multiple osseous foci of increased radiotracer uptake consistent with metastatic disease.  The appearance is unchanged compared with the 06/12/2014 exam.  There is a stable, 6 x 4-mm, left lower lobe pulmonary nodule not significantly changed compared with the 06/12/2014 exam and a myomatous uterus.  In terms of imaging, it would be reasonable to perform a followup PET/CT in January.    2.  Restaging with PET/CT of the chest, abdomen and pelvis in January.  She did have a brain MRI which showed no evidence of metastases, and we do not need to repeat this unless there are symptoms.   3.  Breast imaging question.  No mammography.  4.   Lymphedema referral has been placed.   5.  Genetic testing has been requested and we await the results.   6.  Continue Xgeva every 6 weeks.   7.  Followup.  Hoda will return to clinic in 3 weeks for her next Herceptin treatment.  She will receive Xgeva today.  Genetics follow up 11-3.  Follow up 11-21, 12-12 with KARISSA and with me January 2 with Herceptin infusion, CBC, CMP and echocardiogram January 2.  Flu shot today. PET CT December 29.   Lymphedema clinic referral.       Thank you for allowing us to continue to participate in Hoda Brush's care.     Sincerely,    Lisandro Aguiar M.D.      Division of Hematology, Oncology, Transplant   Department of Medicine   [employer] [place] [employer]   [phone]     I spent 30 minutes with the patient more than 50% of which was in counseling and coordination of care.

## 2017-10-31 ENCOUNTER — INFUSION THERAPY VISIT (OUTPATIENT)
Dept: ONCOLOGY | Facility: CLINIC | Age: 62
End: 2017-10-31
Attending: INTERNAL MEDICINE
Payer: COMMERCIAL

## 2017-10-31 VITALS
OXYGEN SATURATION: 98 % | TEMPERATURE: 98.4 F | BODY MASS INDEX: 30.94 KG/M2 | WEIGHT: 174.6 LBS | HEART RATE: 92 BPM | SYSTOLIC BLOOD PRESSURE: 126 MMHG | DIASTOLIC BLOOD PRESSURE: 78 MMHG | RESPIRATION RATE: 16 BRPM

## 2017-10-31 DIAGNOSIS — C79.51 BONE METASTASIS: ICD-10-CM

## 2017-10-31 DIAGNOSIS — Z51.11 ENCOUNTER FOR ANTINEOPLASTIC CHEMOTHERAPY: ICD-10-CM

## 2017-10-31 DIAGNOSIS — C50.112 CANCER OF CENTRAL PORTION OF LEFT BREAST (H): ICD-10-CM

## 2017-10-31 DIAGNOSIS — C50.912 MALIGNANT NEOPLASM OF LEFT FEMALE BREAST, UNSPECIFIED ESTROGEN RECEPTOR STATUS, UNSPECIFIED SITE OF BREAST (H): Primary | ICD-10-CM

## 2017-10-31 LAB
ALBUMIN SERPL-MCNC: 3.2 G/DL (ref 3.4–5)
ALP SERPL-CCNC: 42 U/L (ref 40–150)
ALT SERPL W P-5'-P-CCNC: 24 U/L (ref 0–50)
ANION GAP SERPL CALCULATED.3IONS-SCNC: 8 MMOL/L (ref 3–14)
AST SERPL W P-5'-P-CCNC: 20 U/L (ref 0–45)
BASOPHILS # BLD AUTO: 0 10E9/L (ref 0–0.2)
BASOPHILS NFR BLD AUTO: 0.5 %
BILIRUB SERPL-MCNC: 0.2 MG/DL (ref 0.2–1.3)
BUN SERPL-MCNC: 14 MG/DL (ref 7–30)
CALCIUM SERPL-MCNC: 8.1 MG/DL (ref 8.5–10.1)
CANCER AG27-29 SERPL-ACNC: 7 U/ML (ref 0–39)
CEA SERPL-MCNC: <0.5 UG/L (ref 0–2.5)
CHLORIDE SERPL-SCNC: 108 MMOL/L (ref 94–109)
CO2 SERPL-SCNC: 26 MMOL/L (ref 20–32)
CREAT SERPL-MCNC: 0.69 MG/DL (ref 0.52–1.04)
DIFFERENTIAL METHOD BLD: ABNORMAL
EOSINOPHIL # BLD AUTO: 0.5 10E9/L (ref 0–0.7)
EOSINOPHIL NFR BLD AUTO: 6.3 %
ERYTHROCYTE [DISTWIDTH] IN BLOOD BY AUTOMATED COUNT: 13.3 % (ref 10–15)
GFR SERPL CREATININE-BSD FRML MDRD: 86 ML/MIN/1.7M2
GLUCOSE SERPL-MCNC: 105 MG/DL (ref 70–99)
HCT VFR BLD AUTO: 36.4 % (ref 35–47)
HGB BLD-MCNC: 11.3 G/DL (ref 11.7–15.7)
IMM GRANULOCYTES # BLD: 0 10E9/L (ref 0–0.4)
IMM GRANULOCYTES NFR BLD: 0.3 %
LYMPHOCYTES # BLD AUTO: 2.7 10E9/L (ref 0.8–5.3)
LYMPHOCYTES NFR BLD AUTO: 36.4 %
MCH RBC QN AUTO: 29.7 PG (ref 26.5–33)
MCHC RBC AUTO-ENTMCNC: 31 G/DL (ref 31.5–36.5)
MCV RBC AUTO: 96 FL (ref 78–100)
MONOCYTES # BLD AUTO: 0.5 10E9/L (ref 0–1.3)
MONOCYTES NFR BLD AUTO: 6.4 %
NEUTROPHILS # BLD AUTO: 3.8 10E9/L (ref 1.6–8.3)
NEUTROPHILS NFR BLD AUTO: 50.1 %
NRBC # BLD AUTO: 0 10*3/UL
NRBC BLD AUTO-RTO: 0 /100
PLATELET # BLD AUTO: 217 10E9/L (ref 150–450)
POTASSIUM SERPL-SCNC: 3.8 MMOL/L (ref 3.4–5.3)
PROT SERPL-MCNC: 7.3 G/DL (ref 6.8–8.8)
RBC # BLD AUTO: 3.81 10E12/L (ref 3.8–5.2)
SODIUM SERPL-SCNC: 142 MMOL/L (ref 133–144)
WBC # BLD AUTO: 7.5 10E9/L (ref 4–11)

## 2017-10-31 PROCEDURE — 82378 CARCINOEMBRYONIC ANTIGEN: CPT | Performed by: INTERNAL MEDICINE

## 2017-10-31 PROCEDURE — 85025 COMPLETE CBC W/AUTO DIFF WBC: CPT | Performed by: INTERNAL MEDICINE

## 2017-10-31 PROCEDURE — 96372 THER/PROPH/DIAG INJ SC/IM: CPT | Mod: ZF

## 2017-10-31 PROCEDURE — T1013 SIGN LANG/ORAL INTERPRETER: HCPCS | Mod: U3

## 2017-10-31 PROCEDURE — 90686 IIV4 VACC NO PRSV 0.5 ML IM: CPT | Mod: ZF | Performed by: INTERNAL MEDICINE

## 2017-10-31 PROCEDURE — 25000128 H RX IP 250 OP 636: Mod: ZF | Performed by: INTERNAL MEDICINE

## 2017-10-31 PROCEDURE — 96372 THER/PROPH/DIAG INJ SC/IM: CPT | Mod: 59

## 2017-10-31 PROCEDURE — T1013 SIGN LANG/ORAL INTERPRETER: HCPCS | Mod: U3,ZF

## 2017-10-31 PROCEDURE — G0008 ADMIN INFLUENZA VIRUS VAC: HCPCS

## 2017-10-31 PROCEDURE — 96413 CHEMO IV INFUSION 1 HR: CPT

## 2017-10-31 PROCEDURE — 86300 IMMUNOASSAY TUMOR CA 15-3: CPT | Performed by: INTERNAL MEDICINE

## 2017-10-31 PROCEDURE — 99213 OFFICE O/P EST LOW 20 MIN: CPT | Mod: ZF

## 2017-10-31 PROCEDURE — 99214 OFFICE O/P EST MOD 30 MIN: CPT | Mod: ZP | Performed by: INTERNAL MEDICINE

## 2017-10-31 PROCEDURE — 80053 COMPREHEN METABOLIC PANEL: CPT | Performed by: INTERNAL MEDICINE

## 2017-10-31 RX ORDER — EPINEPHRINE 0.3 MG/.3ML
0.3 INJECTION SUBCUTANEOUS EVERY 5 MIN PRN
Status: CANCELLED | OUTPATIENT
Start: 2017-10-31

## 2017-10-31 RX ORDER — HEPARIN SODIUM (PORCINE) LOCK FLUSH IV SOLN 100 UNIT/ML 100 UNIT/ML
5 SOLUTION INTRAVENOUS
Status: COMPLETED | OUTPATIENT
Start: 2017-10-31 | End: 2017-10-31

## 2017-10-31 RX ORDER — HEPARIN SODIUM (PORCINE) LOCK FLUSH IV SOLN 100 UNIT/ML 100 UNIT/ML
5 SOLUTION INTRAVENOUS EVERY 8 HOURS
Status: CANCELLED
Start: 2017-10-31

## 2017-10-31 RX ORDER — TAMOXIFEN CITRATE 20 MG/1
20 TABLET ORAL DAILY
Qty: 90 TABLET | Refills: 3 | Status: SHIPPED | OUTPATIENT
Start: 2017-10-31 | End: 2018-11-20

## 2017-10-31 RX ORDER — HEPARIN SODIUM (PORCINE) LOCK FLUSH IV SOLN 100 UNIT/ML 100 UNIT/ML
5 SOLUTION INTRAVENOUS EVERY 8 HOURS
Status: DISCONTINUED | OUTPATIENT
Start: 2017-10-31 | End: 2017-10-31 | Stop reason: HOSPADM

## 2017-10-31 RX ORDER — EPINEPHRINE 1 MG/ML
0.3 INJECTION, SOLUTION, CONCENTRATE INTRAVENOUS EVERY 5 MIN PRN
Status: CANCELLED | OUTPATIENT
Start: 2017-10-31

## 2017-10-31 RX ORDER — MEPERIDINE HYDROCHLORIDE 25 MG/ML
25 INJECTION INTRAMUSCULAR; INTRAVENOUS; SUBCUTANEOUS EVERY 30 MIN PRN
Status: CANCELLED | OUTPATIENT
Start: 2017-10-31

## 2017-10-31 RX ORDER — ALBUTEROL SULFATE 0.83 MG/ML
2.5 SOLUTION RESPIRATORY (INHALATION)
Status: CANCELLED | OUTPATIENT
Start: 2017-10-31

## 2017-10-31 RX ORDER — SODIUM CHLORIDE 9 MG/ML
1000 INJECTION, SOLUTION INTRAVENOUS CONTINUOUS PRN
Status: CANCELLED
Start: 2017-10-31

## 2017-10-31 RX ORDER — ACETAMINOPHEN 325 MG/1
650 TABLET ORAL
Status: CANCELLED | OUTPATIENT
Start: 2017-10-31

## 2017-10-31 RX ORDER — DIPHENHYDRAMINE HYDROCHLORIDE 50 MG/ML
50 INJECTION INTRAMUSCULAR; INTRAVENOUS
Status: CANCELLED
Start: 2017-10-31

## 2017-10-31 RX ORDER — LORAZEPAM 2 MG/ML
0.5 INJECTION INTRAMUSCULAR EVERY 4 HOURS PRN
Status: CANCELLED
Start: 2017-10-31

## 2017-10-31 RX ORDER — METHYLPREDNISOLONE SODIUM SUCCINATE 125 MG/2ML
125 INJECTION, POWDER, LYOPHILIZED, FOR SOLUTION INTRAMUSCULAR; INTRAVENOUS
Status: CANCELLED
Start: 2017-10-31

## 2017-10-31 RX ORDER — DIPHENHYDRAMINE HCL 25 MG
50 CAPSULE ORAL ONCE
Status: CANCELLED
Start: 2017-10-31

## 2017-10-31 RX ORDER — ALBUTEROL SULFATE 90 UG/1
1-2 AEROSOL, METERED RESPIRATORY (INHALATION)
Status: CANCELLED
Start: 2017-10-31

## 2017-10-31 RX ADMIN — SODIUM CHLORIDE, PRESERVATIVE FREE 5 ML: 5 INJECTION INTRAVENOUS at 16:02

## 2017-10-31 RX ADMIN — DENOSUMAB 120 MG: 120 INJECTION SUBCUTANEOUS at 15:18

## 2017-10-31 RX ADMIN — TRASTUZUMAB 478 MG: 150 INJECTION, POWDER, LYOPHILIZED, FOR SOLUTION INTRAVENOUS at 15:33

## 2017-10-31 RX ADMIN — SODIUM CHLORIDE, PRESERVATIVE FREE 5 ML: 5 INJECTION INTRAVENOUS at 13:51

## 2017-10-31 RX ADMIN — INFLUENZA A VIRUS A/MICHIGAN/45/2015 X-275 (H1N1) ANTIGEN (FORMALDEHYDE INACTIVATED), INFLUENZA A VIRUS A/HONG KONG/4801/2014 X-263B (H3N2) ANTIGEN (FORMALDEHYDE INACTIVATED), INFLUENZA B VIRUS B/PHUKET/3073/2013 ANTIGEN (FORMALDEHYDE INACTIVATED), AND INFLUENZA B VIRUS B/BRISBANE/60/2008 ANTIGEN (FORMALDEHYDE INACTIVATED) 0.5 ML: 15; 15; 15; 15 INJECTION, SUSPENSION INTRAMUSCULAR at 14:43

## 2017-10-31 ASSESSMENT — PAIN SCALES - GENERAL: PAINLEVEL: NO PAIN (0)

## 2017-10-31 NOTE — LETTER
10/31/2017       RE: Hoda Brush  [address] [address]     Dear Colleague,    Thank you for referring your patient, Hoda Brush, to the [facility]. Please see a copy of my visit note below.    Hoda is seen with her daughter, Ashleigh, today for a new patient visit.  The patient is a refugee from [facility] and is here for continuation of care for her metastatic ER+HER2- breast cancer.  She is a [employer] refugee from [facility] and was seen in clinic with her daughter.  The only data we have from [facility] is an English translation of her records.       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  On 02/11/2014, she was seen by an oncologist.  The clinical staging of T4b N2 M1 was noted.   Her breast cancer was on the left side. There was no right breast cancer, confirmed by the patient and her daughter, correcting a possible error in the translated records.  ER was positive in 100% of the cells, GA at 14% and HER2 was 3+ positive.  It appears that this histopathologic information is on the mastectomy specimen. She underwent an MRI for staging of presumptive bone metastases which was performed 03/02/2014.  There were skeletal metastases in the thoracic vertebrae at 12, L1, L3, L5 and S1 vertebral body, ranging in size from 0.7-3.0 cm in size.  Ischial and right femur were also involved, as well as a large iliac mass measuring about 15 cm in the uterus. 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   Radiation Oncology consultation was performed 03/11/2014, and she was given radiation in 1 dose of 6 Gy to the right iliac lesion.        With the initial diagnosis, she initiated treatment with 6 cycles of CAF neoadjuvant chemotherapy 02/14, 07/07, 03/28, 04/18/14, 05/08 and 05/29/14. She also received monthly zoledronic acid.  She then underwent a modified left mastectomy of Palacios type and left  lymphadenopathy on 06/27/2014.   Pathologic examination showed number at [facility] was [id number]/14 showed infiltrative grade 2 ductal cancer with 6 level 1 metastatic lymph nodes and 3 level 2 metastatic lymph nodes.  The differentiation was intermediate.  The tumor was ER positive 70% of the cells, AK positive in 40% of the cells and HER2 was 3+ by immunohistochemistry and the Ki-67 labeling index was 20%. Her staging after surgery was stage IV, pT4b N2 M1.  I don't see a biopsy of the skeletal metastases.  She then had continuation with chemotherapy with 4 cycles of Herceptin and taxane with montly zoledronic acid.  Tamoxifen was initiated.  She then had two years of Herceptin and a decision was made not to continue further Herceptin.  She continued on zoledronic acid every 3 months. She was clinically stable. [place] then [place] the [place] new refugee from [facility].  She arrived last month, established [place] residency and now seeks further oncology care.       Overall, oHda has been doing reasonably well.  She has medical assistance from the [facility].     She was admitted 9/20-9/21 with LUE cellulitis with leukocytosis. Given a dose of IV Ancef and discharged on keflex.    INTERVAL HISTORY:  Hoda returns to clinic and has been doing quite well.  She did have an infection in the left arm requiring antibiotics.  This has now resolved.  She does have left arm lymphedema and wants to be referred to the Lymphedema Clinic.  The port was not affected by the infection.  She has no pain, no fatigue, no depression, and no anxiety.  A 10-point review of systems is entirely negative.  She continues on tamoxifen and has been tolerated it quite well.       PHYSICAL EXAMINATION:   VITAL SIGNS:  Blood pressure 126/78, temperature 98.4, pulse 92, respirations 16, O2 sat 98% on room air, weight 79.2 kg.   GENERAL:  Hoda appeared generally well.     HEENT:  She has no alopecia.  Examination of the oropharynx  is without lesions.   LYMPH:  There is no palpable cervical, supraclavicular, subclavicular or axillary lymphadenopathy.     CHEST:  Port site is clean and dry without erythema or tenderness.   BREASTS:  Breast exam was not performed today.   LUNGS:  Clear to percussion and auscultation.   HEART:  There is a regular rate and rhythm, S1, S2.   ABDOMEN:  Soft, nontender, without hepatosplenomegaly.   EXTREMITIES:  Remarkable for left arm edema.  Right arm appeared normal.   PSYCHIATRIC:  Mood and affect were normal.        LABORATORY DATA:  CMP is remarkable for calcium 8.1 and albumin slightly low at 3.2.  The hemoglobin was 11.3.  The remainder of the CBC was normal.  The CEA has been 0.7 and the CA 27-29 is 5.  We will not follow these further.       ASSESSMENT AND PLAN:     1.  Hoda Brush is a 62-year-old woman from [facility] but formerly from [facility] [place]  She lives with her daughter and is here for continuation of every 3-week Herceptin and tamoxifen.  Her tumor is ER-positive, HI-positive, HER2-positive.  She has metastatic breast cancer with bone-only metastases by report.  She presented and was diagnosed with metastatic breast cancer in approximately 02/2014, when she was diagnosed with a T4 N2 M1, invasive ductal carcinoma of the left breast.  The pathology report from [facility] shows ER-positive in 70% of the cells, HI-positive in 40% of the cells, and HER2/kenia was 3+ positive by immunohistochemistry.  Ki-67 was 20%.  She has no evidence of metastatic disease on PET/CT imaging except for multiple osseous foci of increased radiotracer uptake consistent with metastatic disease.  The appearance is unchanged compared with the 06/12/2014 exam.  There is a stable, 6 x 4-mm, left lower lobe pulmonary nodule not significantly changed compared with the 06/12/2014 exam and a myomatous uterus.  In terms of imaging, it would be reasonable to perform a followup PET/CT in January.    2.   Restaging with PET/CT of the chest, abdomen and pelvis in January.  She did have a brain MRI which showed no evidence of metastases, and we do not need to repeat this unless there are symptoms.   3.  Breast imaging question.  No mammography.  4.  Lymphedema referral has been placed.   5.  Genetic testing has been requested and we await the results.   6.  Continue Xgeva every 6 weeks.   7.  Followup.  Hoda will return to clinic in 3 weeks for her next Herceptin treatment.  She will receive Xgeva today.  Genetics follow up 11-3.  Follow up 11-21, 12-12 with KARISSA and with me January 2 with Herceptin infusion, CBC, CMP and echocardiogram January 2.  Flu shot today. PET CT December 29.   Lymphedema clinic referral.       Thank you for allowing us to continue to participate in Hoda Brush's care.     Sincerely,    Lisandro Aguiar M.D.      Division of Hematology, Oncology, Transplant   Department of Medicine   [employer] [facility] [facility] [employer]   [phone]     I spent 30 minutes with the patient more than 50% of which was in counseling and coordination of care.     Again, thank you for allowing me to participate in the care of your patient.      Sincerely,    Lisandro Aguiar MD

## 2017-10-31 NOTE — PROGRESS NOTES
Infusion Nursing Note:  Hoda Brush presents today for Cycle 3 Day 1 Herceptin.    Patient seen by provider today: Yes: Lisandro Aguiar MD prior to infusion.    Treatment Conditions:  Lab Results   Component Value Date    HGB 11.3 10/31/2017     Lab Results   Component Value Date    WBC 7.5 10/31/2017      Lab Results   Component Value Date    ANEU 3.8 10/31/2017     Lab Results   Component Value Date     10/31/2017      Lab Results   Component Value Date     10/31/2017                   Lab Results   Component Value Date    POTASSIUM 3.8 10/31/2017           No results found for: MAG         Lab Results   Component Value Date    CR 0.69 10/31/2017                   Lab Results   Component Value Date    TAYLER 8.1 10/31/2017                Lab Results   Component Value Date    BILITOTAL 0.2 10/31/2017           Lab Results   Component Value Date    ALBUMIN 3.2 10/31/2017                    Lab Results   Component Value Date    ALT 24 10/31/2017           Lab Results   Component Value Date    AST 20 10/31/2017     Results reviewed, labs MET treatment parameters, ok to proceed with treatment.  ECHO 8/29/17 EF = 60-65%    Intravenous Access:  Implanted Port.    Note: Hoda has no complaints or concerns today.  is present with her today. Pre-meds state to give prior to Paclitaxel or if prior reaction to Trastuzumab; no prior reaction to first 2 doses of Herceptin and not receiving Paclitaxel today. No premeds given to patient at today's infusion.    Post Infusion Assessment:  Patient tolerated infusion without incident.  Blood return noted pre and post infusion.  No evidence of extravasations.  Access discontinued per protocol.    Discharge Plan:   Patient declined prescription refills.  Discharge instructions reviewed with: Patient.  Patient and/or family verbalized understanding of discharge instructions and all questions answered.  Copy of AVS reviewed with patient and/or family.  Patient  will return 11/22/17 for next appointment.  Patient discharged in stable condition accompanied by: .  Departure Mode: Ambulatory.    Shaina Kramer RN

## 2017-10-31 NOTE — NURSING NOTE
"Oncology Rooming Note    October 31, 2017 2:11 PM   Hoda Brush is a 62 year old female who presents for:    Chief Complaint   Patient presents with     Port Draw     port accessed and labs drawn by rn.  vs taken.     Oncology Clinic Visit     Return: Breast Cancer     Initial Vitals: /78 (BP Location: Right arm, Patient Position: Sitting, Cuff Size: Adult Regular)  Pulse 92  Temp 98.4  F (36.9  C) (Oral)  Resp 16  Wt 79.2 kg (174 lb 9.6 oz)  SpO2 98%  BMI 30.94 kg/m2 Estimated body mass index is 30.94 kg/(m^2) as calculated from the following:    Height as of 10/10/17: 1.6 m (5' 2.99\").    Weight as of this encounter: 79.2 kg (174 lb 9.6 oz). Body surface area is 1.88 meters squared.  No Pain (0) Comment: Data Unavailable   No LMP recorded. Patient is postmenopausal.  Allergies reviewed: Yes  Medications reviewed: Yes    Medications: Medication refills not needed today.  Pharmacy name entered into [employer]:    [id number] [place] [facility] - [place], [place] - [address] [id number] - [place], [place] - [address] AT [facility] & [address] [place] [address] DRUG STORE [facility] [place], [place] - [address] AT [facility]/[address] [place] [address]    Clinical concerns: Injectable Influenza Immunization Documentation    1.  Has the patient received the information for the injectable influenza vaccine? YES     2. Is the patient 6 months of age or older? YES     3. Does the patient have any of the following contraindications?         Severe allergy to eggs? No     Severe allergic reaction to previous influenza vaccines? No   Severe allergy to latex? No       History of Guillain-[place] syndrome? No     Currently have a temperature greater than 100.4F? No        4.  Severely egg allergic patients should have flu vaccine eligibility assessed by an MD, RN, or pharmacist, and those who received flu vaccine should be observed for 15 min by an MD, RN, Pharmacist, Medical Technician, or member of clinic " "staff.\": NO    5. Latex-allergic patients should be given latex-free influenza vaccine No. Please reference the Vaccine latex table to determine if your clinic s product is latex-containing             10 minutes for nursing intake (face to face time)     Naun Cao MA              "

## 2017-10-31 NOTE — MR AVS SNAPSHOT
"              After Visit Summary   10/31/2017    Hoda Brush    MRN: [id number]           Patient Information     Date Of Birth          1955        Visit Information        Provider Department      10/31/2017 1:45 PM Wesley Iraheta; Lisandro Aguiar MD [facility]        Today's Diagnoses     Malignant neoplasm of left female breast, unspecified estrogen receptor status, unspecified site of breast (H)    -  1    Encounter for antineoplastic chemotherapy        Cancer of central portion of left breast (H)           Follow-ups after your visit        Additional Services     LYMPHEDEMA THERAPY REFERRAL       *This therapy referral will be filtered to a centralized scheduling office at [facility] and the patient will receive a call to schedule an appointment at a [place] location most convenient for them. *   If you have not heard from the scheduling office within 2 business days, please call [phone] for all locations, with the exception of [place], please call [phone].     Treatment: PT or OT Evaluation & Treatment  Special Instructions: right arm fluid  PT/OT Treatment Diagnosis: Edema    Please be aware that coverage of these services is subject to the terms and limitations of your health insurance plan.  Call member services at your health plan with any benefit or coverage questions.      **Note to Provider:  If you are referring outside of [place] for the therapy appointment, please list the name of the location in the \"special instructions\" above, print the referral and give to the patient to schedule the appointment.            LYMPHEDEMA THERAPY REFERRAL       *This therapy referral will be filtered to a centralized scheduling office at [facility] and the patient will receive a call to schedule an appointment at a [place] location most convenient for them. *   If you have not heard from the scheduling office within " "2 business days, please call [phone] for all locations, with the exception of [place], please call [phone].     Treatment: PT or OT Evaluation & Treatment  Special Instructions: left arm edema  PT/OT Treatment Diagnosis: Edema    Please be aware that coverage of these services is subject to the terms and limitations of your health insurance plan.  Call member services at your health plan with any benefit or coverage questions.      **Note to Provider:  If you are referring outside of [place] for the therapy appointment, please list the name of the location in the \"special instructions\" above, print the referral and give to the patient to schedule the appointment.                  Follow-up notes from your care team     Return in about 3 weeks (around 11/21/2017).      Your next 10 appointments already scheduled     Nov 13, 2017  2:30 PM CST   Lymphedema Evaluation with [place] OT   [facility] ([facility])    [address] [place] [phone]   [phone]            Nov 22, 2017  9:45 AM CST   [facility] Lab Draw with [facility] D[facility]   [facility] ([facility])    [address] [place] [phone]   [phone]            Nov 22, 2017 10:20 AM CST   (Arrive by 10:05 AM)   Return Visit with SEMAJ Walls   [facility])    [address] [place] [phone]   [phone]            Nov 22, 2017 11:30 AM CST   Infusion 60 with UC ONCOLOGY INFUSION, UC 24 ATC   [facility] ([facility])    [address] [place] [phone]   [phone]            Dec 12, 2017  2:00 PM CST   [facility] Lab Draw with [facility] DRAW   [facility] Lab Draw ([facility])    [address] [place] [address] " [phone]   [phone]            Dec 12, 2017  2:40 PM CST   (Arrive by 2:25 PM)   Return Visit with SEMAJ Walls   [facility] ([facility])    [address] [place] [address]  2nd [facility] [address] [phone]            Dec 12, 2017  3:30 PM CST   Infusion 60 with UC ONCOLOGY INFUSION, UC 16 ATC   [facility] ([facility])    [address] [place] [address]  2nd [facility] [phone] [phone]            Jan 02, 2018 11:00 AM CST   Ech Complete with [id number]   [facility])    [address] [place] [address]  3rd [facility] [id number] [phone]           1. Please bring or wear a comfortable two-piece outfit. 2. You may eat, drink and take your normal medicines. 3. For any questions that cannot be answered, please contact the ordering physician              Who to contact     If you have questions or need follow up information about today's clinic visit or your schedule please contact [facility] directly at [phone].  Normal or non-critical lab and imaging results will be communicated to you by FanXThart, letter or phone within 4 business days after the clinic has received the results. If you do not hear from us within 7 days, please contact the clinic through FanXThart or phone. If you have a critical or abnormal lab result, we will notify you by phone as soon as possible.  Submit refill requests through Lightwaves or call your pharmacy and they will forward the refill request to us. Please allow 3 business days for your refill to be completed.          Additional Information About Your Visit        Lightwaves Information     Lightwaves gives you secure access to your electronic health record. If you see a primary care provider, you can also send messages to your care team and make appointments. If you have questions, please call your primary care  clinic.  If you do not have a primary care provider, please call [phone] and they will assist you.        Care EveryWhere ID     This is your Care EveryWhere ID. This could be used by other organizations to access your [place] medical records  [id number]        Your Vitals Were     Pulse Temperature Respirations Pulse Oximetry BMI (Body Mass Index)       92 98.4  F (36.9  C) (Oral) 16 98% 30.94 kg/m2        Blood Pressure from Last 3 Encounters:   10/31/17 126/78   10/10/17 121/79   09/28/17 130/79    Weight from Last 3 Encounters:   10/31/17 79.2 kg (174 lb 9.6 oz)   10/10/17 78.8 kg (173 lb 12.8 oz)   09/28/17 78.8 kg (173 lb 12.8 oz)              We Performed the Following     LYMPHEDEMA THERAPY REFERRAL     LYMPHEDEMA THERAPY REFERRAL          Where to get your medicines      These medications were sent to [id number] - [place], [place] - [address] AT  [address] [place] [address] [place] [address]     Phone:  [phone]     tamoxifen 20 MG tablet          Primary Care Provider    Physician No Ref-Primary       NO REF-PRIMARY PHYSICIAN        Equal Access to Services     NOE VAZQUEZ AH: Hadii kamran casanova hadasho Sopepeali, waaxda luqadaha, qaybta kaalmada adeegyada, alexa beltran. So [facility] [phone].    ATENCIÓN: Si habla español, tiene a lagunas disposición servicios gratuitos de asistencia lingüística. Llame al [phone].    We comply with applicable federal civil rights laws and [place] laws. We do not discriminate on the basis of race, color, national origin, age, disability, sex, sexual orientation, or gender identity.            Thank you!     Thank you for choosing [facility]  for your care. Our goal is always to provide you with excellent care. Hearing back from our patients is one way we can continue to improve our services. Please take a few minutes to complete the written survey that you may receive in the mail  after your visit with us. Thank you!             Your Updated Medication List - Protect others around you: Learn how to safely use, store and throw away your medicines at www.disposemymeds.org.          This list is accurate as of: 10/31/17 11:59 PM.  Always use your most recent med list.                   Brand Name Dispense Instructions for use Diagnosis    cephALEXin 500 MG capsule    KEFLEX    56 capsule    Take 1 capsule (500 mg) by mouth every 6 hours    Cellulitis of left upper extremity       order for DME     1 each    L UE class 2 compression sleeve and gauntlet Night garment Bandaging supplies    Lymphedema of left upper extremity       tamoxifen 20 MG tablet    NOLVADEX    90 tablet    Take 1 tablet (20 mg) by mouth daily    Cancer of central portion of left breast (H)       VITAMIN D (CHOLECALCIFEROL) PO      Take 1,000 Units by mouth daily

## 2017-10-31 NOTE — NURSING NOTE
"Chief Complaint   Patient presents with     Port Draw     port accessed and labs drawn by rn.  vs taken.     Port accessed with 20g 3/4\" gripper needle, labs drawn, port flushed with saline and heparin, vitals checked.  Sarita Elliott RN    "

## 2017-10-31 NOTE — PATIENT INSTRUCTIONS
October 2017 Sunday Monday Tuesday Wednesday Thursday Friday Saturday   1     2     3     4     5     6     7       8     9     10     [facility] D[facility]   12:30 PM   (30 min.)   [facility]   [facility] Lab Draw     [facility] RETURN   12:45 PM   (90 min.)   Anne Lea PA   [facility]     [facility] ONC INFUSION 60    2:00 PM   (60 min.)   [facility] ONCOLOGY INFUSION   [facility] 11     12     13     14       15     16     17     18     19     20     21       22     23     24     25     26     27     28       29     30     31     [facility] DRAW    1:30 PM   (15 min.)   [facility]   [facility] Lab Draw     [facility] RETURN    1:45 PM   (75 min.)   Lisandro Aguiar MD   [facility]     [facility] ONC INFUSION 60    3:00 PM   (90 min.)    ONCOLOGY INFUSION   [facility]                                November 2017 Sunday Monday Tuesday Wednesday Thursday Friday Saturday                  1     2     3     [facility] RETURN WITH ROOM    1:00 PM   (60 min.)   Lianne Saavedra GC   [facility] 4       5     6     7     8     9     10     11       12     13     14     15     16     17     18       19     20     21     22     [facility] D[facility]    9:45 AM   (15 min.)    [facility] DRAW   [facility] Lab Draw     [facility] RETURN   10:05 AM   (50 min.)   Anne Lea PA   [facility]     [facility] ONC INFUSION 60   11:30 AM   (60 min.)    ONCOLOGY INFUSION   [facility] 23     24     25       26     27     28     29     30                            Lab Results:  Recent Results (from the past 12 hour(s))   CBC with platelets differential    Collection Time: 10/31/17  1:56 PM   Result Value Ref [place]    WBC 7.5 4.0 - 11.0 10e9/L    RBC Count 3.81 3.8 - 5.2 10e12/L    Hemoglobin 11.3 (L) 11.7 - 15.7 g/dL    Hematocrit 36.4 35.0 - 47.0 %    MCV 96 78 - 100 fl     MCH 29.7 26.5 - 33.0 pg    MCHC 31.0 (L) 31.5 - 36.5 g/dL    RDW 13.3 10.0 - 15.0 %    Platelet Count 217 150 - 450 10e9/L    Diff Method Automated Method     % Neutrophils 50.1 %    % Lymphocytes 36.4 %    % Monocytes 6.4 %    % Eosinophils 6.3 %    % Basophils 0.5 %    % Immature Granulocytes 0.3 %    Nucleated RBCs 0 0 /100    Absolute Neutrophil 3.8 1.6 - 8.3 10e9/L    Absolute Lymphocytes 2.7 0.8 - 5.3 10e9/L    Absolute Monocytes 0.5 0.0 - 1.3 10e9/L    Absolute Eosinophils 0.5 0.0 - 0.7 10e9/L    Absolute Basophils 0.0 0.0 - 0.2 10e9/L    Abs Immature Granulocytes 0.0 0 - 0.4 10e9/L    Absolute Nucleated RBC 0.0    Comprehensive metabolic panel    Collection Time: 10/31/17  1:56 PM   Result Value Ref [place]    Sodium 142 133 - 144 mmol/L    Potassium 3.8 3.4 - 5.3 mmol/L    Chloride 108 94 - 109 mmol/L    Carbon Dioxide 26 20 - 32 mmol/L    Anion Gap 8 3 - 14 mmol/L    Glucose 105 (H) 70 - 99 mg/dL    Urea Nitrogen 14 7 - 30 mg/dL    Creatinine 0.69 0.52 - 1.04 mg/dL    GFR Estimate 86 >60 mL/min/1.7m2    GFR Estimate If Black >90 >60 mL/min/1.7m2    Calcium 8.1 (L) 8.5 - 10.1 mg/dL    Bilirubin Total 0.2 0.2 - 1.3 mg/dL    Albumin 3.2 (L) 3.4 - 5.0 g/dL    Protein Total 7.3 6.8 - 8.8 g/dL    Alkaline Phosphatase 42 40 - 150 U/L    ALT 24 0 - 50 U/L    AST 20 0 - 45 U/L     Contact Numbers    [facility] Main Line: [phone]  [facility] Triage:  [phone]    Call triage with chills and/or temperature greater than or equal to 100.5, uncontrolled nausea/vomiting, diarrhea, constipation, dizziness, shortness of breath, chest pain, bleeding, unexplained bruising, or any new/concerning symptoms, questions/concerns.     If you are having any concerning symptoms or wish to speak to a provider before your next infusion visit, please call your care coordinator or triage to notify them so we can adequately serve you.       After Hours: [phone]    If after hours, weekends, or holidays, call main hospital  and ask  for Oncology doctor on call.

## 2017-10-31 NOTE — MR AVS SNAPSHOT
After Visit Summary   10/31/2017    Hoda Brush    MRN: [id number]           Patient Information     Date Of Birth          1955        Visit Information        Provider Department      10/31/2017 3:00 PM Wesley Iraheta;  15 ATC;  ONCOLOGY INFUSION  Services Department        Today's Diagnoses     Malignant neoplasm of left female breast, unspecified estrogen receptor status, unspecified site of breast (H)    -  1    Bone metastasis (H)          Care Instructions          October 2017 Sunday Monday Tuesday Wednesday Thursday Friday Saturday   1     2     3     4     5     6     7       8     9     10     [facility]   12:30 PM   (30 min.)    [facility] DRAW   [facility] Lab Draw     [facility] RETURN   12:45 PM   (90 min.)   Anne Lea PA   [facility]     [facility] ONC INFUSION 60    2:00 PM   (60 min.)    ONCOLOGY INFUSION   [facility] 11     12     13     14       15     16     17     18     19     20     21       22     23     24     25     26     27     28       29     30     31     [facility]    1:30 PM   (15 min.)    [facility] DRAW   [facility] Draw     [facility] RETURN    1:45 PM   (75 min.)   Lisandro Aguiar MD   [facility]     [facility] ONC INFUSION 60    3:00 PM   (90 min.)    ONCOLOGY INFUSION   [facility]                                November 2017 Sunday Monday Tuesday Wednesday Thursday Friday Saturday                  1     2     3     [facility] RETURN WITH ROOM    1:00 PM   (60 min.)   Lianne Saavedra GC   [facility] 4       5     6     7     8     9     10     11       12     13     14     15     16     17     18       19     20     21     22     [facility]    9:45 AM   (15 min.)    [facility] DRAW   [facility] Lab Draw     [facility] RETURN   10:05 AM   (50 min.)   Anne Lea PA   [facility]  [facility] ONC INFUSION 60   11:30 AM   (60 min.)    ONCOLOGY INFUSION   [facility] 23     24     25       26     27     28     29     30                            Lab Results:  Recent Results (from the past 12 hour(s))   CBC with platelets differential    Collection Time: 10/31/17  1:56 PM   Result Value Ref Range    WBC 7.5 4.0 - 11.0 10e9/L    RBC Count 3.81 3.8 - 5.2 10e12/L    Hemoglobin 11.3 (L) 11.7 - 15.7 g/dL    Hematocrit 36.4 35.0 - 47.0 %    MCV 96 78 - 100 fl    MCH 29.7 26.5 - 33.0 pg    MCHC 31.0 (L) 31.5 - 36.5 g/dL    RDW 13.3 10.0 - 15.0 %    Platelet Count 217 150 - 450 10e9/L    Diff Method Automated Method     % Neutrophils 50.1 %    % Lymphocytes 36.4 %    % Monocytes 6.4 %    % Eosinophils 6.3 %    % Basophils 0.5 %    % Immature Granulocytes 0.3 %    Nucleated RBCs 0 0 /100    Absolute Neutrophil 3.8 1.6 - 8.3 10e9/L    Absolute Lymphocytes 2.7 0.8 - 5.3 10e9/L    Absolute Monocytes 0.5 0.0 - 1.3 10e9/L    Absolute Eosinophils 0.5 0.0 - 0.7 10e9/L    Absolute Basophils 0.0 0.0 - 0.2 10e9/L    Abs Immature Granulocytes 0.0 0 - 0.4 10e9/L    Absolute Nucleated RBC 0.0    Comprehensive metabolic panel    Collection Time: 10/31/17  1:56 PM   Result Value Ref Range    Sodium 142 133 - 144 mmol/L    Potassium 3.8 3.4 - 5.3 mmol/L    Chloride 108 94 - 109 mmol/L    Carbon Dioxide 26 20 - 32 mmol/L    Anion Gap 8 3 - 14 mmol/L    Glucose 105 (H) 70 - 99 mg/dL    Urea Nitrogen 14 7 - 30 mg/dL    Creatinine 0.69 0.52 - 1.04 mg/dL    GFR Estimate 86 >60 mL/min/1.7m2    GFR Estimate If Black >90 >60 mL/min/1.7m2    Calcium 8.1 (L) 8.5 - 10.1 mg/dL    Bilirubin Total 0.2 0.2 - 1.3 mg/dL    Albumin 3.2 (L) 3.4 - 5.0 g/dL    Protein Total 7.3 6.8 - 8.8 g/dL    Alkaline Phosphatase 42 40 - 150 U/L    ALT 24 0 - 50 U/L    AST 20 0 - 45 U/L     Contact Numbers    [facility] Main Line: [phone]  [facility] Triage:  [phone]    Call triage with chills and/or temperature  greater than or equal to 100.5, uncontrolled nausea/vomiting, diarrhea, constipation, dizziness, shortness of breath, chest pain, bleeding, unexplained bruising, or any new/concerning symptoms, questions/concerns.     If you are having any concerning symptoms or wish to speak to a provider before your next infusion visit, please call your care coordinator or triage to notify them so we can adequately serve you.       After Hours: [phone]    If after hours, weekends, or holidays, call main hospital  and ask for Oncology doctor on call.             Follow-ups after your visit        Your next 10 appointments already scheduled     Nov 03, 2017  1:15 PM CDT   (Arrive by 1:00 PM)   RETURN WITH ROOM with Lianne Saavedra GC,  2 114 CONSULT RM   [facility])    [address] [place] [address]   [phone]            Nov 22, 2017  9:45 AM CST   [facility] with  [facility] D[facility]   [facility] ([facility])    [address] [place] [address]   [phone]            Nov 22, 2017 10:20 AM CST   (Arrive by 10:05 AM)   Return Visit with SEMAJ Walls   [facility])    [address] [place] [address]   [phone]            Nov 22, 2017 11:30 AM CST   Infusion 60 with  ONCOLOGY INFUSION, UC 24 ATC   [facility])    [address] [place] [address]   [phone]            Dec 12, 2017  2:00 PM CST   [facility] Lab Draw with  [facility] LAB DRAW   [facility] Draw ([facility])    [address] [place] [address]   [phone]            Dec 12, 2017  2:40 PM CST   (Arrive by 2:25 PM)   Return Visit with SEMAJ Walls    [facility] ([facility])    [address]  2nd Floor  [facility] [address] [phone]            Dec 12, 2017  3:30 PM CST   Infusion 60 with UC ONCOLOGY INFUSION, UC 16 ATC   [facility] ([facility])    [address]  [address]nd Floor  [facility] [address]   [phone]              Future tests that were ordered for you today     Open Standing Orders        Priority Remaining Interval Expires Ordered    CBC with platelets differential Routine 52/52  10/31/2018 10/31/2017    Comprehensive metabolic panel Routine 52/52  10/31/2018 10/31/2017    CBC with platelets differential Routine 52/52  10/31/2018 10/31/2017    Comprehensive metabolic panel Routine 52/52  10/31/2018 10/31/2017          Open Future Orders        Priority Expected Expires Ordered    Echocardiogram Complete Routine  10/31/2018 10/31/2017            Who to contact     If you have questions or need follow up information about today's clinic visit or your schedule please contact [facility] directly at [phone].  Normal or non-critical lab and imaging results will be communicated to you by [employer]art, letter or phone within 4 business days after the clinic has received the results. If you do not hear from us within 7 days, please contact the clinic through [employer] or phone. If you have a critical or abnormal lab result, we will notify you by phone as soon as possible.  Submit refill requests through Jamgle or call your pharmacy and they will forward the refill request to us. Please allow 3 business days for your refill to be completed.          Additional Information About Your Visit        Jamgle Information     Jamgle gives you secure access to your electronic health record. If you see a primary care provider, you can also send messages to your care team and make appointments. If you have questions, please call your  primary care clinic.  If you do not have a primary care provider, please call [phone] and they will assist you.        Care EveryWhere ID     This is your Care EveryWhere ID. This could be used by other organizations to access your [facility] medical records  [id number]         Blood Pressure from Last 3 Encounters:   10/31/17 126/78   10/10/17 121/79   09/28/17 130/79    Weight from Last 3 Encounters:   10/31/17 79.2 kg (174 lb 9.6 oz)   10/10/17 78.8 kg (173 lb 12.8 oz)   09/28/17 78.8 kg (173 lb 12.8 oz)              We Performed the Following     CA 27.29 Breast tumor marker     CBC with platelets differential     CEA     Comprehensive metabolic panel          Where to get your medicines      These medications were sent to [facility] [address] [place] [address], [place] [address] [facility]  [address] [place] [address]     Phone:  [phone]     tamoxifen 20 MG tablet          Primary Care Provider    Physician No Ref-Primary       NO REF-PRIMARY PHYSICIAN        Equal Access to Services     [facility] : Hadii kamran ku malini Ramsey, waaxda lujoann, qaybta kaalexa ruiz. So [facility] [phone].    ATENCIÓN: Si habla español, tiene a lagunas disposición servicios gratuitos de asistencia lingüística. Luis al [phone].    We comply with applicable federal civil rights laws and [place] laws. We do not discriminate on the basis of race, color, national origin, age, disability, sex, sexual orientation, or gender identity.            Thank you!     Thank you for choosing [facility]  for your care. Our goal is always to provide you with excellent care. Hearing back from our patients is one way we can continue to improve our services. Please take a few minutes to complete the written survey that you may receive in the mail after your visit with us. Thank you!             Your Updated Medication List - Protect  others around you: Learn how to safely use, store and throw away your medicines at www.disposemymeds.org.          This list is accurate as of: 10/31/17  3:22 PM.  Always use your most recent med list.                   Brand Name Dispense Instructions for use Diagnosis    cephALEXin 500 MG capsule    KEFLEX    56 capsule    Take 1 capsule (500 mg) by mouth every 6 hours    Cellulitis of left upper extremity       order for DME     1 each    L UE class 2 compression sleeve and gauntlet Night garment Bandaging supplies    Lymphedema of left upper extremity       tamoxifen 20 MG tablet    NOLVADEX    90 tablet    Take 1 tablet (20 mg) by mouth daily    Cancer of central portion of left breast (H)       VITAMIN D (CHOLECALCIFEROL) PO      Take 1,000 Units by mouth daily

## 2017-10-31 NOTE — NURSING NOTE
Hoda Brush      1.  Has the patient received the information for the influenza vaccine? YES    2.  Does the patient have any of the following contraindications?     Allergy to eggs? No     Allergic reaction to previous influenza vaccines? No     Any other problems to previous influenza vaccines? No     Paralyzed by Guillain-Strum syndrome? No     Currently pregnant? NO     Current moderate or severe illness? No     Allergy to contact lens solution? No    3.  The vaccine has been administered in the usual fashion and the patient was instructed to wait 20 minutes before leaving the building in the event of an allergic reaction: NO    Vaccination given by Zoraida Soto LPN.  Recorded by Zoraida Melara LPN    Flu vaccination given today in right deltoid. Patient tolerated well.     Zoraida Melara LPN

## 2017-11-02 LAB — COPATH REPORT: NORMAL

## 2017-11-03 ENCOUNTER — OFFICE VISIT (OUTPATIENT)
Dept: ONCOLOGY | Facility: CLINIC | Age: 62
End: 2017-11-03
Attending: GENETIC COUNSELOR, MS
Payer: COMMERCIAL

## 2017-11-03 DIAGNOSIS — Z80.3 FAMILY HISTORY OF MALIGNANT NEOPLASM OF BREAST: ICD-10-CM

## 2017-11-03 DIAGNOSIS — C50.912 MALIGNANT NEOPLASM OF LEFT BREAST IN FEMALE, ESTROGEN RECEPTOR POSITIVE, UNSPECIFIED SITE OF BREAST (H): Primary | ICD-10-CM

## 2017-11-03 DIAGNOSIS — Z17.0 MALIGNANT NEOPLASM OF LEFT BREAST IN FEMALE, ESTROGEN RECEPTOR POSITIVE, UNSPECIFIED SITE OF BREAST (H): Primary | ICD-10-CM

## 2017-11-03 PROCEDURE — 40000072 ZZH STATISTIC GENETIC COUNSELING, < 16 MIN: Mod: ZF | Performed by: GENETIC COUNSELOR, MS

## 2017-11-03 NOTE — LETTER
"11/3/2017       RE: Hoda Brush  [address] [address]     Dear Colleague,    Thank you for referring your patient, Hoda Brush, to the [facility]. Please see a copy of my visit note below.    11/3/2017    Referring Provider: Lisandro Aguiar MD    Presenting Information:  Hoda Brush and her daughter Ashleigh, along with a [place]an , returned to the Cancer Risk Management Program at the [facility] to discuss her genetic testing results. Her blood was drawn on 9/19/2017. The Breast Actionable panel was ordered from the [facility] Laboratory at [facility]. This testing was done because of her personal and family history of breast cancer.    Of note, Hoda's daughter expressed concern regarding a letter they recently received in the mail, which states that Hoda's estimated cost for the test is reportedly high. As this information differs from the information originally provided to us by Hoda's insurance company, I strongly encouraged her to share a copy of the letter with me. I will then follow-up with the laboratory's billing department. Hoda and her daughter verbalized agreement with this plan.    Genetic Testing Result: NEGATIVE  Hoda is negative for mutations in the DEWEY, BRCA1, BRCA2, CDH1, CHEK2, NBN, NF1, PALB2, PTEN, STK11, and TP53 genes by sequencing and deletion/duplication analysis. No mutations were found in any of the 11 genes analyzed.    She does not have an identifiable mutation associated with Hereditary Breast and Ovarian Cancer syndrome (BRCA1, BRCA2), Li Fraumeni syndrome (TP53), Hereditary Diffuse Gastric Cancer (CDH1), Cowden syndrome (PTEN), Peutz-Jeghers syndrome (STK11), or Neurofibromatosis type 1 (NF1).      A copy of the test report can be found in the Laboratory tab, dated 9/20/2017, and named \"Next generation sequencing\".     Interpretation:  We discussed several different interpretations of " this negative test result.    1. One explanation may be that there is a different currently unidentifiable gene or combination of genes and environment that are associated with the breast cancers in Hoda and/or her relatives. If that is the case, additional genetic testing may be available in the future that can identify a genetic/hereditary explanation for Darlines breast cancer. As such, she is encouraged to contact me annually to review any new genetic testing options that may be appropriate for Hoda.  2. It is possible that another relative, such as her mother (depending upon the extent of the genetic testing she pursued), did have a mutation in one of these 11 genes and Hoda did not inherit it. If that is the case, Darlines breast cancer may be a sporadic cancer caused by random cellular changes.  3. There is also a small possibility that there is a mutation in one of these genes and we could not find it with our current testing methods.       Screening:  Based on this negative test result, it is important for Hoda and her relatives to refer back to the family history for appropriate cancer screening.      Hoda should continue to follow all breast cancer treatment and future screening recommendations as made by her medical providers.    Hoda s close female relatives remain at increased risk for breast cancer given their family history. Breast cancer screening is generally recommended to begin approximately 10 years younger than the earliest age of breast cancer diagnosis in the family, or at age 40, whichever comes first. In this family, screening may begin at age 35. Breast screening options should be discussed with an individual's primary care provider and a genetic counselor, to determine what screening is appropriate, or if additional screening (such as breast MRI) is necessary based on personal/family history factors. Of note, Hoda's daughter stated that she has already been evaluated and it  was recommended she have annual mammograms and breast MRIs.    Other population cancer screening options, such as those recommended by the American Cancer Society and the National Comprehensive Cancer Network (NCCN), are also appropriate for Hoda and her family. These screening recommendations may change if there are changes to Hoda's personal and/or family history. Final screening recommendations should be made by each individual's managing physician.      Inheritance:  We reviewed the autosomal dominant inheritance of mutations in these 11 genes. We discussed that Hoda did not pass on an identifiable mutation in these genes to her daughter based on this test result. Mutations in these genes do not skip generations.      Additional Testing Considerations:  It is still possible Hoda does carry a currently identifiable gene or combination of genes and environment that increase her risk for breast cancer. We again reviewed the option of larger gene panels covering more moderate risk genes associated with hereditary breast cancer. We discussed that such testing is available, but it is not clear if insurance would cover additional testing. Hoda felt comfortable declining further genetic testing at this time.     Although Hoda's genetic testing result was negative, other relatives may still carry a gene mutation associated with hereditary breast cancer. Genetic counseling could be considered for Hoda's sister and mother (depending upon the extent of the genetic testing they have already pursued) to discuss any additional genetic testing options. If these relatives do pursue genetic testing, Hoda is encouraged to contact me so that we may review the impact of their test results on Hoda.    Summary:  We do not have an explanation for Hoda's breast cancer. Because of that, it is important that she continue with cancer screening based on her personal and family history as discussed above.    Genetic  testing is rapidly advancing, and new cancer susceptibility genes will most likely be identified in the future. Therefore, I encouraged Hoda to contact me annually or if there are changes in her personal or family history. This may change how we assess her cancer risk, screening, and the testing we would offer.    Plan:  1. I provided Hoda with a copy of her test results today and a handout summarizing negative genetic test results (see after visit summary). She will be mailed a [place] translation of this handout.  2. She plans to follow-up with her medical providers, including Dr. Aguiar.  3. She should contact me annually, or sooner if her family history changes.    If Hoda has any further questions, I encouraged her to contact me at [phone].    Time spent face to face: 15 minutes    Lianne Saavedra MS, [facility]  Certified Genetic Counselor  Office: [phone]  Pager: [phone]

## 2017-11-03 NOTE — PATIENT INSTRUCTIONS
Negative Genetic Test Result    Genetic Testing  You had a blood test that looked at the genetic information in one or more genes associated with increased cancer risk.  The testing looked for any harmful changes that would stop this particular gene from working like it should. If an individual does not have any harmful changes or variants of unknown significance found from their blood test, their genetic test result is reported as negative.       Results  The genetic test did not identify any pathogenic (harmful) changes in the genes that were tested. There are several possible explanations for a negative test result. Without knowing the gene mutation in your family, the cause of the cancer in you or your relatives is still unknown. Your genetic counselor can help interpret the result for you and your relatives. In this case, there are several reasons that may explain the negative test result:    There may be a gene mutation in the family that you did not inherit.     You may have a gene mutation in a different gene that was not included in the test, or has not yet been discovered.     The cancers in you or your family may be due to a combination of genetic factors and environment (multifactorial/familial).    The cancers in you or your family may be sporadic/random cancers.    There is very small chance that a mutation was not found by current testing methods.  As testing technology evolves over time, it may still be possible to identify a mutation in a gene that was not found on this test.    It is important to note which genes were included in your test. A list of these genes can be found on your test result.    Screening Recommendations  Due to this negative test result, cancer screening recommendations should be based on your personal and family history. This may include increased cancer screening for you and/or your family members. Your genetic counselor and health care provider can help make  appropriate recommendations.      Please call us if you have any questions or concerns.     Cancer Risk Management Program  [phone] ([phone])  ? Azeb Thomas, MS, [facility]  [phone]  ? Tanya Barton, MS, [facility]  [phone]  ? Lianne Saavedra, MS, [facility]  [phone]  ? Leonora Mcneil, MS, [facility]  [phone]  ? Jarrett Bowman, MS, [facility]  [phone]

## 2017-11-03 NOTE — LETTER
Cancer Risk Management  Program Locations    [facility]  [facility]  Mailing Address  Cancer Risk Management Program  [facility]  [address]  [place] [address]    New patient appointments  [phone]  November 3, 2017    Hoda Brush  [address]  [facility] [address]      Dear Hoda,    It was a pleasure meeting with you again at the [facility] on November 3, 2017. Here is a copy of the progress note from your recent genetic counseling visit to the Cancer Risk Management Program. If you have any additional questions, please feel free to call.    11/3/2017    Referring Provider: Lisandro Aguiar MD    Presenting Information:  Hoda Brush and her daughter Ashleigh, along with a Paraguayan , returned to the Cancer Risk Management Program at the [facility] to discuss her genetic testing results. Her blood was drawn on 9/19/2017. The Breast Actionable panel was ordered from the Molecular Diagnostics Laboratory at [facility]. This testing was done because of her personal and family history of breast cancer.    Of note, Hoda's daughter expressed concern regarding a letter they recently received in the mail, which states that Hoda's estimated cost for the test is reportedly high. As this information differs from the information originally provided to us by Hoda's insurance company, I strongly encouraged her to share a copy of the letter with me. I will then follow-up with the laboratory's billing department. Hoda and her daughter verbalized agreement with this plan.    Genetic Testing Result: NEGATIVE  Hoda is negative for mutations in the DEWEY, BRCA1, BRCA2, CDH1, CHEK2, NBN, NF1, PALB2, PTEN, STK11, and TP53 genes by sequencing and deletion/duplication analysis. No  "mutations were found in any of the 11 genes analyzed.    She does not have an identifiable mutation associated with Hereditary Breast and Ovarian Cancer syndrome (BRCA1, BRCA2), Li Fraumeni syndrome (TP53), Hereditary Diffuse Gastric Cancer (CDH1), Cowden syndrome (PTEN), Peutz-Jeghers syndrome (STK11), or Neurofibromatosis type 1 (NF1).      A copy of the test report can be found in the Laboratory tab, dated 9/20/2017, and named \"Next generation sequencing\".     Interpretation:  We discussed several different interpretations of this negative test result.    1. One explanation may be that there is a different currently unidentifiable gene or combination of genes and environment that are associated with the breast cancers in Hoda and/or her relatives. If that is the case, additional genetic testing may be available in the future that can identify a genetic/hereditary explanation for Darlines breast cancer. As such, she is encouraged to contact me annually to review any new genetic testing options that may be appropriate for Hoda.  2. It is possible that another relative, such as her mother (depending upon the extent of the genetic testing she pursued), did have a mutation in one of these 11 genes and Hoda did not inherit it. If that is the case, Darlines breast cancer may be a sporadic cancer caused by random cellular changes.  3. There is also a small possibility that there is a mutation in one of these genes and we could not find it with our current testing methods.       Screening:  Based on this negative test result, it is important for Hoda and her relatives to refer back to the family history for appropriate cancer screening.      Hoda should continue to follow all breast cancer treatment and future screening recommendations as made by her medical providers.    Hoda s close female relatives remain at increased risk for breast cancer given their family history. Breast cancer screening is generally " recommended to begin approximately 10 years younger than the earliest age of breast cancer diagnosis in the family, or at age 40, whichever comes first. In this family, screening may begin at age 35. Breast screening options should be discussed with an individual's primary care provider and a genetic counselor, to determine what screening is appropriate, or if additional screening (such as breast MRI) is necessary based on personal/family history factors. Of note, Hoda's daughter stated that she has already been evaluated and it was recommended she have annual mammograms and breast MRIs.    Other population cancer screening options, such as those recommended by the American Cancer Society and the National Comprehensive Cancer Network (NCCN), are also appropriate for Hoda and her family. These screening recommendations may change if there are changes to Hoda's personal and/or family history. Final screening recommendations should be made by each individual's managing physician.      Inheritance:  We reviewed the autosomal dominant inheritance of mutations in these 11 genes. We discussed that Hoda did not pass on an identifiable mutation in these genes to her daughter based on this test result. Mutations in these genes do not skip generations.      Additional Testing Considerations:  It is still possible Hoda does carry a currently identifiable gene or combination of genes and environment that increase her risk for breast cancer. We again reviewed the option of larger gene panels covering more moderate risk genes associated with hereditary breast cancer. We discussed that such testing is available, but it is not clear if insurance would cover additional testing. Hoda felt comfortable declining further genetic testing at this time.     Although Hoda's genetic testing result was negative, other relatives may still carry a gene mutation associated with hereditary breast cancer. Genetic counseling could be  considered for Hoda's sister and mother (depending upon the extent of the genetic testing they have already pursued) to discuss any additional genetic testing options. If these relatives do pursue genetic testing, Hoda is encouraged to contact me so that we may review the impact of their test results on Hoda.    Summary:  We do not have an explanation for Hoda's breast cancer. Because of that, it is important that she continue with cancer screening based on her personal and family history as discussed above.    Genetic testing is rapidly advancing, and new cancer susceptibility genes will most likely be identified in the future. Therefore, I encouraged Hoda to contact me annually or if there are changes in her personal or family history. This may change how we assess her cancer risk, screening, and the testing we would offer.    Plan:  1. I provided Hoda with a copy of her test results today and a handout summarizing negative genetic test results (see after visit summary). She will be mailed a Citizen of [place] translation of this handout.  2. She plans to follow-up with her medical providers, including Dr. Aguiar.  3. She should contact me annually, or sooner if her family history changes.    If Hoda has any further questions, I encouraged her to contact me at [phone].    Time spent face to face: 15 minutes    Lianne Saavedra MS, [facility]  Certified Genetic Counselor  Office: [phone]  Pager: [phone]

## 2017-11-03 NOTE — MR AVS SNAPSHOT
After Visit Summary   11/3/2017    Hoda Brush    MRN: [id number]           Patient Information     Date Of Birth          1955        Visit Information        Provider Department      11/3/2017 1:00 PM Lianne Saavedra GC; [place] [employer];  2 [address] CONSULT [facility]        Care Instructions            Negative Genetic Test Result    Genetic Testing  You had a blood test that looked at the genetic information in one or more genes associated with increased cancer risk.  The testing looked for any harmful changes that would stop this particular gene from working like it should. If an individual does not have any harmful changes or variants of unknown significance found from their blood test, their genetic test result is reported as negative.       Results  The genetic test did not identify any pathogenic (harmful) changes in the genes that were tested. There are several possible explanations for a negative test result. Without knowing the gene mutation in your family, the cause of the cancer in you or your relatives is still unknown. Your genetic counselor can help interpret the result for you and your relatives. In this case, there are several reasons that may explain the negative test result:    There may be a gene mutation in the family that you did not inherit.     You may have a gene mutation in a different gene that was not included in the test, or has not yet been discovered.     The cancers in you or your family may be due to a combination of genetic factors and environment (multifactorial/familial).    The cancers in you or your family may be sporadic/random cancers.    There is very small chance that a mutation was not found by current testing methods.  As testing technology evolves over time, it may still be possible to identify a mutation in a gene that was not found on this test.    It is important to note which genes were included in your  test. A list of these genes can be found on your test result.    Screening Recommendations  Due to this negative test result, cancer screening recommendations should be based on your personal and family history. This may include increased cancer screening for you and/or your family members. Your genetic counselor and health care provider can help make appropriate recommendations.      Please call us if you have any questions or concerns.     Cancer Risk Management Program  [phone] ([phone])  ? Azeb Thomas, MS, [facility]  [phone]  ? Tanya Mick, MS, [facility]  [phone]  ? Lianne Saavedra, MS, [facility]  [phone]  ? Leonora Mcneil, MS, [facility]  [phone]  ? Jarrett Bowman, MS, [facility]  [phone]          Follow-ups after your visit        Your next 10 appointments already scheduled     Nov 22, 2017  9:45 AM CST   Masonic Lab Draw with  MASONIC LAB DRAW   [facility] Masonic Lab Draw ([facility])    [address] [place] [address]   [phone]            Nov 22, 2017 10:20 AM CST   (Arrive by 10:05 AM)   Return Visit with SEMAJ Walls   [facility], [facility])    [address] [place] [address]   [phone]            Nov 22, 2017 11:30 AM CST   Infusion 60 with UC ONCOLOGY INFUSION, UC 24 ATC   [facility] ([facility])    [address] [place] [address]   [phone]            Dec 12, 2017  2:00 PM CST   Masonic Lab Draw with UC MASONIC LAB DRAW   [facility] ([facility])    [address] [place] [address]   [phone]            Dec 12, 2017  2:40 PM CST   (Arrive by 2:25 PM)   Return Visit with SEMAJ Walls   [facility] ([facility])    [address] [place] [address]  [address]   [phone]            Dec 12, 2017  3:30 PM CST   Infusion 60 with UC ONCOLOGY INFUSION, UC 16 ATC   [facility] ([facility])    [address] [place] [address]  [address] [facility] [address]   [phone]            Jan 02, 2018 10:30 AM CST   Masonic Lab Draw with UC MASONIC LAB DRAW   [facility] Lab Draw ([facility])    [address] [place] [address]  [address] [facility] [address] [phone]            Jan 02, 2018 11:00 AM CST   Ech Complete with UCECHCR4   [facility] ([facility])    [address] [place] [address]  3rd Floor  [facility] [address] [phone]           1. Please bring or wear a comfortable two-piece outfit. 2. You may eat, drink and take your normal medicines. 3. For any questions that cannot be answered, please contact the ordering physician              Who to contact     If you have questions or need follow up information about today's clinic visit or your schedule please contact [facility] directly at [phone].  Normal or non-critical lab and imaging results will be communicated to you by GradeStackhart, letter or phone within 4 business days after the clinic has received the results. If you do not hear from us within 7 days, please contact the clinic through GradeStackhart or phone. If you have a critical or abnormal lab result, we will notify you by phone as soon as possible.  Submit refill requests through BareedEE or call your pharmacy and they will forward the refill request to us. Please allow 3 business days for your refill to be completed.          Additional Information About Your Visit        GradeStackharVingle Information     BareedEE gives you secure access to your electronic health record. If you see a primary care provider, you can also send messages to your care team and make appointments. If you have questions, please call your primary care clinic.  If you do not have  a primary care provider, please call [phone] and they will assist you.        Care EveryWhere ID     This is your Care EveryWhere ID. This could be used by other organizations to access your [place] medical records  [id number]         Blood Pressure from Last 3 Encounters:   10/31/17 126/78   10/10/17 121/79   09/28/17 130/79    Weight from Last 3 Encounters:   10/31/17 79.2 kg (174 lb 9.6 oz)   10/10/17 78.8 kg (173 lb 12.8 oz)   09/28/17 78.8 kg (173 lb 12.8 oz)              Today, you had the following     No orders found for display       Primary Care Provider    Physician No Ref-Primary       NO REF-PRIMARY PHYSICIAN        Equal Access to Services     NOE VAZQUEZ : Hadii kamran portilloo Roxy, waaxda luqadaha, qaybta kaalmada adesarahyateo, alexa angulo . So [facility] [phone].    ATENCIÓN: Si habla español, tiene a lagunas disposición servicios gratuitos de asistencia lingüística. Llame al [phone].    We comply with applicable federal civil rights laws and [place] laws. We do not discriminate on the basis of race, color, national origin, age, disability, sex, sexual orientation, or gender identity.            Thank you!     Thank you for choosing [facility]  for your care. Our goal is always to provide you with excellent care. Hearing back from our patients is one way we can continue to improve our services. Please take a few minutes to complete the written survey that you may receive in the mail after your visit with us. Thank you!             Your Updated Medication List - Protect others around you: Learn how to safely use, store and throw away your medicines at www.disposemymeds.org.          This list is accurate as of: 11/3/17  1:07 PM.  Always use your most recent med list.                   Brand Name Dispense Instructions for use Diagnosis    cephALEXin 500 MG capsule    KEFLEX    56 capsule    Take 1 capsule (500 mg) by mouth every 6 hours     Cellulitis of left upper extremity       order for DME     1 each    L UE class 2 compression sleeve and gauntlet Night garment Bandaging supplies    Lymphedema of left upper extremity       tamoxifen 20 MG tablet    NOLVADEX    90 tablet    Take 1 tablet (20 mg) by mouth daily    Cancer of central portion of left breast (H)       VITAMIN D (CHOLECALCIFEROL) PO      Take 1,000 Units by mouth daily

## 2017-11-03 NOTE — PROGRESS NOTES
"11/3/2017    Referring Provider: Lisandro Aguiar MD    Presenting Information:  Hoda Brush and her daughter Ashleigh, along with a Azerbaijani , returned to the Cancer Risk Management Program at the [facility] to discuss her genetic testing results. Her blood was drawn on 9/19/2017. The Breast Actionable panel was ordered from the Molecular Diagnostics Laboratory at [facility]. This testing was done because of her personal and family history of breast cancer.    Of note, Hoda's daughter expressed concern regarding a letter they recently received in the mail, which states that Hoda's estimated cost for the test is reportedly high. As this information differs from the information originally provided to us by Hoda's insurance company, I strongly encouraged her to share a copy of the letter with me. I will then follow-up with the laboratory's billing department. Hoda and her daughter verbalized agreement with this plan.    Genetic Testing Result: NEGATIVE  Hoda is negative for mutations in the DEWEY, BRCA1, BRCA2, CDH1, CHEK2, NBN, NF1, PALB2, PTEN, STK11, and TP53 genes by sequencing and deletion/duplication analysis. No mutations were found in any of the 11 genes analyzed.    She does not have an identifiable mutation associated with Hereditary Breast and Ovarian Cancer syndrome (BRCA1, BRCA2), Li Fraumeni syndrome (TP53), Hereditary Diffuse Gastric Cancer (CDH1), Cowden syndrome (PTEN), Peutz-Jeghers syndrome (STK11), or Neurofibromatosis type 1 (NF1).      A copy of the test report can be found in the Laboratory tab, dated 9/20/2017, and named \"Next generation sequencing\".     Interpretation:  We discussed several different interpretations of this negative test result.    1. One explanation may be that there is a different currently unidentifiable gene or combination of genes and environment that are associated with the breast cancers in Hoda and/or her relatives. If that is the case, " additional genetic testing may be available in the future that can identify a genetic/hereditary explanation for Hoda's breast cancer. As such, she is encouraged to contact me annually to review any new genetic testing options that may be appropriate for Hoda.  2. It is possible that another relative, such as her mother (depending upon the extent of the genetic testing she pursued), did have a mutation in one of these 11 genes and Hoda did not inherit it. If that is the case, Darlines breast cancer may be a sporadic cancer caused by random cellular changes.  3. There is also a small possibility that there is a mutation in one of these genes and we could not find it with our current testing methods.       Screening:  Based on this negative test result, it is important for Hoda and her relatives to refer back to the family history for appropriate cancer screening.      Hoda should continue to follow all breast cancer treatment and future screening recommendations as made by her medical providers.    Hoda s close female relatives remain at increased risk for breast cancer given their family history. Breast cancer screening is generally recommended to begin approximately 10 years younger than the earliest age of breast cancer diagnosis in the family, or at age 40, whichever comes first. In this family, screening may begin at age 35. Breast screening options should be discussed with an individual's primary care provider and a genetic counselor, to determine what screening is appropriate, or if additional screening (such as breast MRI) is necessary based on personal/family history factors. Of note, Hoda's daughter stated that she has already been evaluated and it was recommended she have annual mammograms and breast MRIs.    Other population cancer screening options, such as those recommended by the American Cancer Society and the National Comprehensive Cancer Network (NCCN), are also appropriate for Hoda  and her family. These screening recommendations may change if there are changes to Hoda's personal and/or family history. Final screening recommendations should be made by each individual's managing physician.      Inheritance:  We reviewed the autosomal dominant inheritance of mutations in these 11 genes. We discussed that Hoda did not pass on an identifiable mutation in these genes to her daughter based on this test result. Mutations in these genes do not skip generations.      Additional Testing Considerations:  It is still possible Hoda does carry a currently identifiable gene or combination of genes and environment that increase her risk for breast cancer. We again reviewed the option of larger gene panels covering more moderate risk genes associated with hereditary breast cancer. We discussed that such testing is available, but it is not clear if insurance would cover additional testing. Hoda felt comfortable declining further genetic testing at this time.     Although Hoda's genetic testing result was negative, other relatives may still carry a gene mutation associated with hereditary breast cancer. Genetic counseling could be considered for Hoda's sister and mother (depending upon the extent of the genetic testing they have already pursued) to discuss any additional genetic testing options. If these relatives do pursue genetic testing, Hoda is encouraged to contact me so that we may review the impact of their test results on Hoda.    Summary:  We do not have an explanation for Hoda's breast cancer. Because of that, it is important that she continue with cancer screening based on her personal and family history as discussed above.    Genetic testing is rapidly advancing, and new cancer susceptibility genes will most likely be identified in the future. Therefore, I encouraged Hoda to contact me annually or if there are changes in her personal or family history. This may change how we assess  her cancer risk, screening, and the testing we would offer.    Plan:  1. I provided Hoda with a copy of her test results today and a handout summarizing negative genetic test results (see after visit summary). She will be mailed a Thai translation of this handout.  2. She plans to follow-up with her medical providers, including Dr. Aguiar.  3. She should contact me annually, or sooner if her family history changes.    If Hoda has any further questions, I encouraged her to contact me at [phone].    Time spent face to face: 15 minutes    Lianne Saavedra MS, [facility] [place] [place]  Certified Genetic Counselor  Office: [phone]  Pager: [phone]

## 2017-11-07 ENCOUNTER — CARE COORDINATION (OUTPATIENT)
Dept: CARE COORDINATION | Facility: CLINIC | Age: 62
End: 2017-11-07

## 2017-11-07 NOTE — PROGRESS NOTES
Clinic Care Coordination Contact    Situation: Patient chart reviewed by care coordinator.    Background: patient attended genetic testing appointment to determine potential cause of her breast cancer and also to determine risk for immediate family members    Assessment: as above    Plan/Recommendations: genetic testing determine to be normal so no known cause of patient's breast cancer. Immediate family members still with increase risk and will need earlier screening for breast cancer. RN CC to follow up as needed.     Sarita Dent R.N.  Clinic Care Coordinator  [facility]  [phone]

## 2017-11-13 ENCOUNTER — HOSPITAL ENCOUNTER (OUTPATIENT)
Dept: OCCUPATIONAL THERAPY | Facility: CLINIC | Age: 62
Setting detail: THERAPIES SERIES
End: 2017-11-13
Attending: INTERNAL MEDICINE
Payer: COMMERCIAL

## 2017-11-13 PROCEDURE — 97166 OT EVAL MOD COMPLEX 45 MIN: CPT | Mod: GO | Performed by: OCCUPATIONAL THERAPIST

## 2017-11-13 PROCEDURE — 40000445 ZZHC STATISTIC OT VISIT, LYMPHEDEMA: Performed by: OCCUPATIONAL THERAPIST

## 2017-11-14 NOTE — PROGRESS NOTES
11/13/17 1417   Rehab Discipline   Discipline OT   Type of Visit   Type of visit Initial Edema Evaluation       present Yes   Language Guatemalan   General Information   Start of care 11/13/17   Referring physician Lisandro Aguiar MD   Orders Evaluate and treat as indicated   Order date 10/31/17   Medical diagnosis LUE Edema, br ca   Onset of illness / date of surgery 10/31/17   Edema onset 10/31/17   Edema etiology Cancer with lymph node dissection   Location - Cancer with lymph node dissection LUQ   Edema etiology comments Pt first noted swelling in LUE 7'2014 soon after L mastectomy.   She was issued a LUE compression sleeve in [place], but no other therapy was recommended at that time.   Pertinent history of current problem (PT: include personal factors and/or comorbidities that impact the POC; OT: include additional occupational profile info) Pt was diagnosed and treated for metastatic L breast cancer in her home country of [place] in 2014.   Diagnostic imaging revealed metastases to spine.  She underwent 6 cycles of chemotherapy prior to undergoing L mastectomy with LALND 6.27.14.   It is unclear re the total number of lymph nodes dissected, however one recent MD note in Epic cited 6 + LNs at level one dissection, and 3 + LNs at level 2 dissection.   She was admitted to [facility] from ED 9.20.17 with an episode of LUE cellulitis.   Pt was evaluated by program therapist Vidya Hunter, PT-CLT 8.28.17.   Currently pt has chemotherapy every 3 weeks.   Prior treatment Compression garments   Community support Family / friend caregiver  (Spouse & daughter)   Patient role / employment history Retired   Living environment House / [facility]   Living environment comments Lives in house with her daughter   General observations Pt is a recent immigrant with refugee status from [place].   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   Fall Screening   Fall screen completed by OT   Per patient,  fall 2 or more times in past year? No   Per patient, fall with injury in past year? No   Is patient a fall risk? No   System Outcome Measures   Lymphedema Life Impact Scale (score range 0-72). A higher score indicates greater impairment. 6   Subjective Report   Patient report of symptoms Currently mild swelling LUE, skin mildly tight.   Pt had one episode of cellulitis 9'2017.   Pain   Patient currently in pain No   Cognitive Status   Orientation Orientation to person, place and time   Level of consciousness Alert   Follows commands and answers questions 100% of the time   Personal safety and judgement Intact   Memory Intact   Cognitive status comments  present, however pt appeared to understand at least 50% of what was said today in this session.   Edema Exam / Assessment   Skin condition comments LUE visibly > vs RUE, pitting over ulna & triceps.   No visible swelling in hand or LUQ.   L mastectomy site well healed & moderately adhered.   Girth Measurements   Girth Measurements Refer to separate girth measurement flowsheet   Volume UE   Right UE (mL) 2341ml   Left UE (mL) 2873ml   UE volume comparison LUE volume greater than RUE volume   % difference LUE > RUE = 23%; 8.28.17 LUE > RUE = 12%  (Pt is R hand dominant, LUE affected by lymphedema)   Range of Motion   ROM No deficits were identified   Strength   Strength (Pt cites some loss of  strength in L hand but WFL)   Activities of Daily Living   Activities of Daily Living Pt reports independent all ADLs/IADLs; spouse assists with transportation.   Gait / Locomotion   Gait / Locomotion Pt independent ambulation, mobility and transfers   Sensory   Sensory perception comments Pt cites intact sensation LUE/LUQ   Planned Edema Interventions   Planned edema interventions Manual lymph drainage;Gradient compression bandaging;Home management program development  (Recommend intensive Complete Decongestive Therapy (CDT))   Planned edema intervention comments  Pt education provided re physiology/pathophysiology of venous and lymphatic systems specific to her condition of post-mastectomy lymphedema, potential sequelae of  unresolved lymphedema, and typical Complete Decongestive Therapy (CDT)  protocol for phase 1 intensive clinical CDT & phase 2 home program CDT.   Pt ED re precautions to help prevent infection/skin breakdown & exacerbation of lymphedema.   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   Influenced by the following impairments / conditions Stage 2  (Stage 2=not reversible with elevation & soft-tissue fibrosis)   Assessment of Occupational Performance 3-5 Performance Deficits   Identified Performance Deficits h/o cellulitis, active metastatic cancer, dramatic incr LUE volume vs 8'2017, impaired mgmt chronic lymphedema left untreated high risk recurrent infection   Clinical Decision Making (Complexity) Moderate complexity   Treatment frequency (5 x wk x 2 wks)   Patient / family and/or staff in agreement with plan of care Yes  (May schedule after holiday season; set goals accordingly)   Risks and benefits of therapy have been explained Yes   Clinical impression comments LUE lymphedema appears to have progressed considerably vs last seen by lymphedema therapist in this program 8'2017.   Goal 1   Goal identifier LUE volume   Goal description For decreased risk infection, skin breakdown/wounds & progressive soft-tissue fibrosis volume will be reduced to less than 15% > vs LUE volume.   Target date 01/31/18   Goal 2   Goal identifier GCB   Goal description To reduce volume of lymphedema and soft-tissue fibrosis pt will tolerate up to 23hr/day wear gradient compression bandaging (GCB) LUE.   Target date 01/31/18   Goal 3   Goal identifier Home program   Goal description For long-term home mgmt chronic lymphedema pt/caregiver independent in home program a. GCB/GCB  alternative garment for night wear b. compression garment for day wear c. ex to incr lymph flow/self-MLD.   Target date 01/31/18   Goal 4   Goal identifier Lymphedema precautions   Goal description For decreased risk infection, skin breakdown/wounds, and progressive soft-tissue fibrosis patient will verbalize understanding re lymphedema precautions.   Target date 01/31/18   Total Evaluation Time   Total evaluation time 60

## 2017-11-22 ENCOUNTER — INFUSION THERAPY VISIT (OUTPATIENT)
Dept: ONCOLOGY | Facility: CLINIC | Age: 62
End: 2017-11-22
Attending: INTERNAL MEDICINE
Payer: COMMERCIAL

## 2017-11-22 VITALS
BODY MASS INDEX: 31.98 KG/M2 | HEART RATE: 94 BPM | WEIGHT: 173.8 LBS | HEIGHT: 62 IN | DIASTOLIC BLOOD PRESSURE: 75 MMHG | SYSTOLIC BLOOD PRESSURE: 122 MMHG | TEMPERATURE: 98 F | OXYGEN SATURATION: 98 %

## 2017-11-22 DIAGNOSIS — C50.912 MALIGNANT NEOPLASM OF LEFT FEMALE BREAST, UNSPECIFIED ESTROGEN RECEPTOR STATUS, UNSPECIFIED SITE OF BREAST (H): Primary | ICD-10-CM

## 2017-11-22 DIAGNOSIS — C50.919 METASTATIC BREAST CANCER: Primary | ICD-10-CM

## 2017-11-22 DIAGNOSIS — C50.112 CANCER OF CENTRAL PORTION OF LEFT BREAST (H): ICD-10-CM

## 2017-11-22 DIAGNOSIS — C79.51 BONE METASTASIS: ICD-10-CM

## 2017-11-22 LAB
ALBUMIN SERPL-MCNC: 3.2 G/DL (ref 3.4–5)
ALP SERPL-CCNC: 38 U/L (ref 40–150)
ALT SERPL W P-5'-P-CCNC: 24 U/L (ref 0–50)
ANION GAP SERPL CALCULATED.3IONS-SCNC: 7 MMOL/L (ref 3–14)
AST SERPL W P-5'-P-CCNC: 19 U/L (ref 0–45)
BASOPHILS # BLD AUTO: 0 10E9/L (ref 0–0.2)
BASOPHILS NFR BLD AUTO: 0.4 %
BILIRUB SERPL-MCNC: 0.3 MG/DL (ref 0.2–1.3)
BUN SERPL-MCNC: 15 MG/DL (ref 7–30)
CALCIUM SERPL-MCNC: 8.9 MG/DL (ref 8.5–10.1)
CANCER AG27-29 SERPL-ACNC: 8 U/ML (ref 0–39)
CEA SERPL-MCNC: 0.6 UG/L (ref 0–2.5)
CHLORIDE SERPL-SCNC: 108 MMOL/L (ref 94–109)
CO2 SERPL-SCNC: 28 MMOL/L (ref 20–32)
CREAT SERPL-MCNC: 0.72 MG/DL (ref 0.52–1.04)
DIFFERENTIAL METHOD BLD: ABNORMAL
EOSINOPHIL # BLD AUTO: 0.5 10E9/L (ref 0–0.7)
EOSINOPHIL NFR BLD AUTO: 6.6 %
ERYTHROCYTE [DISTWIDTH] IN BLOOD BY AUTOMATED COUNT: 13.3 % (ref 10–15)
GFR SERPL CREATININE-BSD FRML MDRD: 82 ML/MIN/1.7M2
GLUCOSE SERPL-MCNC: 81 MG/DL (ref 70–99)
HCT VFR BLD AUTO: 36.3 % (ref 35–47)
HGB BLD-MCNC: 11.6 G/DL (ref 11.7–15.7)
IMM GRANULOCYTES # BLD: 0 10E9/L (ref 0–0.4)
IMM GRANULOCYTES NFR BLD: 0.1 %
LYMPHOCYTES # BLD AUTO: 2.7 10E9/L (ref 0.8–5.3)
LYMPHOCYTES NFR BLD AUTO: 39.1 %
MCH RBC QN AUTO: 30.3 PG (ref 26.5–33)
MCHC RBC AUTO-ENTMCNC: 32 G/DL (ref 31.5–36.5)
MCV RBC AUTO: 95 FL (ref 78–100)
MONOCYTES # BLD AUTO: 0.4 10E9/L (ref 0–1.3)
MONOCYTES NFR BLD AUTO: 5.9 %
NEUTROPHILS # BLD AUTO: 3.2 10E9/L (ref 1.6–8.3)
NEUTROPHILS NFR BLD AUTO: 47.9 %
NRBC # BLD AUTO: 0 10*3/UL
NRBC BLD AUTO-RTO: 0 /100
PLATELET # BLD AUTO: 211 10E9/L (ref 150–450)
POTASSIUM SERPL-SCNC: 3.9 MMOL/L (ref 3.4–5.3)
PROT SERPL-MCNC: 7.2 G/DL (ref 6.8–8.8)
RBC # BLD AUTO: 3.83 10E12/L (ref 3.8–5.2)
SODIUM SERPL-SCNC: 142 MMOL/L (ref 133–144)
WBC # BLD AUTO: 6.8 10E9/L (ref 4–11)

## 2017-11-22 PROCEDURE — 99214 OFFICE O/P EST MOD 30 MIN: CPT | Mod: ZP | Performed by: PHYSICIAN ASSISTANT

## 2017-11-22 PROCEDURE — 25000128 H RX IP 250 OP 636: Mod: ZF | Performed by: PHYSICIAN ASSISTANT

## 2017-11-22 PROCEDURE — 82378 CARCINOEMBRYONIC ANTIGEN: CPT | Performed by: INTERNAL MEDICINE

## 2017-11-22 PROCEDURE — T1013 SIGN LANG/ORAL INTERPRETER: HCPCS | Mod: U3,ZF,59

## 2017-11-22 PROCEDURE — 86300 IMMUNOASSAY TUMOR CA 15-3: CPT | Performed by: INTERNAL MEDICINE

## 2017-11-22 PROCEDURE — 85025 COMPLETE CBC W/AUTO DIFF WBC: CPT | Performed by: INTERNAL MEDICINE

## 2017-11-22 PROCEDURE — T1013 SIGN LANG/ORAL INTERPRETER: HCPCS | Mod: U3,ZF

## 2017-11-22 PROCEDURE — 80053 COMPREHEN METABOLIC PANEL: CPT | Performed by: INTERNAL MEDICINE

## 2017-11-22 PROCEDURE — 99212 OFFICE O/P EST SF 10 MIN: CPT | Mod: ZF

## 2017-11-22 PROCEDURE — 96413 CHEMO IV INFUSION 1 HR: CPT

## 2017-11-22 PROCEDURE — T1013 SIGN LANG/ORAL INTERPRETER: HCPCS | Mod: U3,59

## 2017-11-22 RX ORDER — MEPERIDINE HYDROCHLORIDE 25 MG/ML
25 INJECTION INTRAMUSCULAR; INTRAVENOUS; SUBCUTANEOUS EVERY 30 MIN PRN
Status: CANCELLED | OUTPATIENT
Start: 2017-11-22

## 2017-11-22 RX ORDER — ALBUTEROL SULFATE 0.83 MG/ML
2.5 SOLUTION RESPIRATORY (INHALATION)
Status: CANCELLED | OUTPATIENT
Start: 2017-11-22

## 2017-11-22 RX ORDER — METHYLPREDNISOLONE SODIUM SUCCINATE 125 MG/2ML
125 INJECTION, POWDER, LYOPHILIZED, FOR SOLUTION INTRAMUSCULAR; INTRAVENOUS
Status: CANCELLED
Start: 2017-11-22

## 2017-11-22 RX ORDER — EPINEPHRINE 1 MG/ML
0.3 INJECTION, SOLUTION, CONCENTRATE INTRAVENOUS EVERY 5 MIN PRN
Status: CANCELLED | OUTPATIENT
Start: 2017-11-22

## 2017-11-22 RX ORDER — HEPARIN SODIUM (PORCINE) LOCK FLUSH IV SOLN 100 UNIT/ML 100 UNIT/ML
5 SOLUTION INTRAVENOUS ONCE
Status: COMPLETED | OUTPATIENT
Start: 2017-11-22 | End: 2017-11-22

## 2017-11-22 RX ORDER — DIPHENHYDRAMINE HYDROCHLORIDE 50 MG/ML
50 INJECTION INTRAMUSCULAR; INTRAVENOUS
Status: CANCELLED
Start: 2017-11-22

## 2017-11-22 RX ORDER — HEPARIN SODIUM (PORCINE) LOCK FLUSH IV SOLN 100 UNIT/ML 100 UNIT/ML
5 SOLUTION INTRAVENOUS EVERY 8 HOURS
Status: DISCONTINUED | OUTPATIENT
Start: 2017-11-22 | End: 2017-11-22 | Stop reason: HOSPADM

## 2017-11-22 RX ORDER — HEPARIN SODIUM (PORCINE) LOCK FLUSH IV SOLN 100 UNIT/ML 100 UNIT/ML
5 SOLUTION INTRAVENOUS EVERY 8 HOURS
Status: CANCELLED | OUTPATIENT
Start: 2017-11-22

## 2017-11-22 RX ORDER — ACETAMINOPHEN 325 MG/1
650 TABLET ORAL
Status: CANCELLED | OUTPATIENT
Start: 2017-11-22

## 2017-11-22 RX ORDER — ALBUTEROL SULFATE 90 UG/1
1-2 AEROSOL, METERED RESPIRATORY (INHALATION)
Status: CANCELLED
Start: 2017-11-22

## 2017-11-22 RX ORDER — DIPHENHYDRAMINE HCL 25 MG
50 CAPSULE ORAL ONCE
Status: CANCELLED
Start: 2017-11-22

## 2017-11-22 RX ORDER — LORAZEPAM 2 MG/ML
0.5 INJECTION INTRAMUSCULAR EVERY 4 HOURS PRN
Status: CANCELLED
Start: 2017-11-22

## 2017-11-22 RX ORDER — SODIUM CHLORIDE 9 MG/ML
1000 INJECTION, SOLUTION INTRAVENOUS CONTINUOUS PRN
Status: CANCELLED
Start: 2017-11-22

## 2017-11-22 RX ORDER — EPINEPHRINE 0.3 MG/.3ML
0.3 INJECTION SUBCUTANEOUS EVERY 5 MIN PRN
Status: CANCELLED | OUTPATIENT
Start: 2017-11-22

## 2017-11-22 RX ADMIN — SODIUM CHLORIDE, PRESERVATIVE FREE 5 ML: 5 INJECTION INTRAVENOUS at 09:48

## 2017-11-22 RX ADMIN — SODIUM CHLORIDE, PRESERVATIVE FREE 5 ML: 5 INJECTION INTRAVENOUS at 12:19

## 2017-11-22 RX ADMIN — TRASTUZUMAB 478 MG: 150 INJECTION, POWDER, LYOPHILIZED, FOR SOLUTION INTRAVENOUS at 11:45

## 2017-11-22 ASSESSMENT — PAIN SCALES - GENERAL: PAINLEVEL: NO PAIN (0)

## 2017-11-22 NOTE — MR AVS SNAPSHOT
After Visit Summary   11/22/2017    Hoda Brush    MRN: [id number]           Patient Information     Date Of Birth          1955        Visit Information        Provider Department      11/22/2017 10:00 AM Wesley Iraheta; Anne Lea PA [facility]        Today's Diagnoses     Metastatic breast cancer (H)    -  1    Bone metastasis (H)           Follow-ups after your visit        Your next 10 appointments already scheduled     Nov 22, 2017 11:30 AM CST   Infusion 60 with UC ONCOLOGY INFUSION, UC 24 ATC   [facility] ([facility])    [address] [place] [id number]   [phone]            Dec 12, 2017  2:00 PM CST   [facility] Lab Draw with  [facility] LAB DRAW   [facility] ([facility])    [address] [place] [id number]   [phone]            Dec 12, 2017  2:40 PM CST   (Arrive by 2:25 PM)   Return Visit with SEMAJ Walls   [facility] ([facility])    [address] [place] [id number]   [phone]            Dec 12, 2017  3:30 PM CST   Infusion 60 with UC ONCOLOGY INFUSION, UC 16 ATC   [facility] ([facility])    [address] [place] [phone]   [phone]            Jan 02, 2018 10:30 AM CST   [facility] Lab Draw with  MASONIC LAB DRAW   [facility] ([facility])    [address] [place] [address]   [phone]            Jan 02, 2018 11:00 AM CST   Ech Complete with UCECHCR4   [facility] Echo ([facility])    [address] [place] [address]   [phone]           1. Please bring or wear a comfortable two-piece outfit. 2. You may eat, drink and take your  normal medicines. 3. For any questions that cannot be answered, please contact the ordering physician            Jan 02, 2018 12:30 PM CST   (Arrive by 12:15 PM)   Return Visit with Lisandro Aguiar MD   [facility] ([facility])    [address] [place] [address] [phone]            Jan 02, 2018  1:30 PM CST   Infusion 60 with UC ONCOLOGY INFUSION   [facility] ([facility])    [address] [place] [address] [phone]              Who to contact     If you have questions or need follow up information about today's clinic visit or your schedule please contact [facility] directly at [phone].  Normal or non-critical lab and imaging results will be communicated to you by [employer], letter or phone within 4 business days after the clinic has received the results. If you do not hear from us within 7 days, please contact the clinic through [employer] or phone. If you have a critical or abnormal lab result, we will notify you by phone as soon as possible.  Submit refill requests through [employer] or call your pharmacy and they will forward the refill request to us. Please allow 3 business days for your refill to be completed.          Additional Information About Your Visit        [employer] Information     [employer] gives you secure access to your electronic health record. If you see a primary care provider, you can also send messages to your care team and make appointments. If you have questions, please call your primary care clinic.  If you do not have a primary care provider, please call [phone] and they will assist you.        Care EveryWhere ID     This is your Care EveryWhere ID. This could be used by other organizations to access your [place] medical records  [id number]        Your Vitals Were     Pulse Temperature Height Pulse Oximetry  "BMI (Body Mass Index)       94 98  F (36.7  C) (Oral) 1.575 m (5' 2\") 98% 31.79 kg/m2        Blood Pressure from Last 3 Encounters:   11/22/17 122/75   10/31/17 126/78   10/10/17 121/79    Weight from Last 3 Encounters:   11/22/17 78.8 kg (173 lb 12.8 oz)   10/31/17 79.2 kg (174 lb 9.6 oz)   10/10/17 78.8 kg (173 lb 12.8 oz)              Today, you had the following     No orders found for display       Primary Care Provider    Physician No Ref-Primary       NO REF-PRIMARY PHYSICIAN        Equal Access to Services     [facility] : Luz Marina Ramsey, ro jefferson, jamal ayalamateo wilkins, alexa angulo . So [facility] [phone].    ATENCIÓN: Si habla español, tiene a lagunas disposición servicios gratuitos de asistencia lingüística. Llame al [phone].    We comply with applicable federal civil rights laws and [place] laws. We do not discriminate on the basis of race, color, national origin, age, disability, sex, sexual orientation, or gender identity.            Thank you!     Thank you for choosing [facility]  for your care. Our goal is always to provide you with excellent care. Hearing back from our patients is one way we can continue to improve our services. Please take a few minutes to complete the written survey that you may receive in the mail after your visit with us. Thank you!             Your Updated Medication List - Protect others around you: Learn how to safely use, store and throw away your medicines at www.disposemymeds.org.          This list is accurate as of: 11/22/17 11:01 AM.  Always use your most recent med list.                   Brand Name Dispense Instructions for use Diagnosis    cephALEXin 500 MG capsule    KEFLEX    56 capsule    Take 1 capsule (500 mg) by mouth every 6 hours    Cellulitis of left upper extremity       order for DME     1 each    L UE class 2 compression sleeve and gauntlet Night garment Bandaging supplies    " Lymphedema of left upper extremity       tamoxifen 20 MG tablet    NOLVADEX    90 tablet    Take 1 tablet (20 mg) by mouth daily    Cancer of central portion of left breast (H)       VITAMIN D (CHOLECALCIFEROL) PO      Take 1,000 Units by mouth daily

## 2017-11-22 NOTE — NURSING NOTE
"Oncology Rooming Note    November 22, 2017 10:16 AM   Hoda Brush is a 62 year old female who presents for:    Chief Complaint   Patient presents with     Port Draw     labs drawn viaport by RN     Oncology Clinic Visit     Return: Breast Cancer     Initial Vitals: /75 (BP Location: Right arm, Patient Position: Chair, Cuff Size: Adult Regular)  Pulse 94  Temp 98  F (36.7  C) (Oral)  Ht 1.575 m (5' 2\")  Wt 78.8 kg (173 lb 12.8 oz)  SpO2 98%  BMI 31.79 kg/m2 Estimated body mass index is 31.79 kg/(m^2) as calculated from the following:    Height as of this encounter: 1.575 m (5' 2\").    Weight as of this encounter: 78.8 kg (173 lb 12.8 oz). Body surface area is 1.86 meters squared.  No Pain (0) Comment: Data Unavailable   No LMP recorded. Patient is postmenopausal.  Allergies reviewed: Yes  Medications reviewed: Yes    Medications: Medication refills not needed today.  Pharmacy name entered into [email or web address]:    [id number] - [place], [place] - [address] [id number] - [place], [place] - [address] AT [facility] & [address] [place] [address] DRUG STORE [id number] - [place], [place] - [address] AT  [address] [facility]    Clinical concerns: Pt. received flu shot on 10/31/2017.        5 minutes for nursing intake (face to face time)     JOHAN Mcfarland      "

## 2017-11-22 NOTE — PATIENT INSTRUCTIONS
Contact Numbers    [facility] Main Line: [phone]  [facility] Triage:  [phone]    Call triage with chills and/or temperature greater than or equal to 100.5, uncontrolled nausea/vomiting, diarrhea, constipation, dizziness, shortness of breath, chest pain, bleeding, unexplained bruising, or any new/concerning symptoms, questions/concerns.     If you are having any concerning symptoms or wish to speak to a provider before your next infusion visit, please call your care coordinator or triage to notify them so we can adequately serve you.       After Hours: [phone]    If after hours, weekends, or holidays, call main hospital  and ask for Oncology doctor on call.         November 2017 Sunday Monday Tuesday Wednesday Thursday Friday Saturday                  1     2     3     UMP RETURN WITH ROOM    1:00 PM   (120 min.)   Lianne Saavedra GC   [facility] 4       5     6     7     8     9     10     11       12     13     LYMPHEDEMA EVALUATION    2:15 PM   (90 min.)   China Dent, OT   [facility] Lymphedema OT 14     15     16     17     18       19     20     21     22     [facility] MASONIC LAB DRAW    9:30 AM   (30 min.)    [facility] LAB DRAW   [facility] Lab Draw     UMP RETURN   10:00 AM   (90 min.)   Anne Lea PA [facility] ONC INFUSION 60   11:30 AM   (90 min.)    ONCOLOGY INFUSION   [facility] 23     24     25       26     27     28     29     30 December 2017 Sunday Monday Tuesday Wednesday Thursday Friday Saturday                            1     2       3     4     5     6     7     8     9       10     11     12     ECH COMPLETE   11:00 AM   (60 min.)   UCECHCR4   [facility] MASONIC LAB DRAW    2:00 PM   (15 min.)    [facility] LAB DRAW   [facility] Lab Draw     UMP RETURN    2:25 PM   (50 min.)   Anne Lea PA [facility]  ONC INFUSION 60    3:30 PM   (60 min.)   UC ONCOLOGY INFUSION   [facility] 13     14     15     16       17     18     19     20     21     PET ONCOLOGY WHOLE BODY    9:00 AM   (45 min.)   UUPET1   [facility] PET CT 22     23       24     25     26     27     28     29     30       31                                                Lab Results:  Recent Results (from the past 12 hour(s))   CBC with platelets differential    Collection Time: 11/22/17 10:04 AM   Result Value Ref Range    WBC 6.8 4.0 - 11.0 10e9/L    RBC Count 3.83 3.8 - 5.2 10e12/L    Hemoglobin 11.6 (L) 11.7 - 15.7 g/dL    Hematocrit 36.3 35.0 - 47.0 %    MCV 95 78 - 100 fl    MCH 30.3 26.5 - 33.0 pg    MCHC 32.0 31.5 - 36.5 g/dL    RDW 13.3 10.0 - 15.0 %    Platelet Count 211 150 - 450 10e9/L    Diff Method Automated Method     % Neutrophils 47.9 %    % Lymphocytes 39.1 %    % Monocytes 5.9 %    % Eosinophils 6.6 %    % Basophils 0.4 %    % Immature Granulocytes 0.1 %    Nucleated RBCs 0 0 /100    Absolute Neutrophil 3.2 1.6 - 8.3 10e9/L    Absolute Lymphocytes 2.7 0.8 - 5.3 10e9/L    Absolute Monocytes 0.4 0.0 - 1.3 10e9/L    Absolute Eosinophils 0.5 0.0 - 0.7 10e9/L    Absolute Basophils 0.0 0.0 - 0.2 10e9/L    Abs Immature Granulocytes 0.0 0 - 0.4 10e9/L    Absolute Nucleated RBC 0.0    Comprehensive metabolic panel    Collection Time: 11/22/17 10:04 AM   Result Value Ref Range    Sodium 142 133 - 144 mmol/L    Potassium 3.9 3.4 - 5.3 mmol/L    Chloride 108 94 - 109 mmol/L    Carbon Dioxide 28 20 - 32 mmol/L    Anion Gap 7 3 - 14 mmol/L    Glucose 81 70 - 99 mg/dL    Urea Nitrogen 15 7 - 30 mg/dL    Creatinine 0.72 0.52 - 1.04 mg/dL    GFR Estimate 82 >60 mL/min/1.7m2    GFR Estimate If Black >90 >60 mL/min/1.7m2    Calcium 8.9 8.5 - 10.1 mg/dL    Bilirubin Total 0.3 0.2 - 1.3 mg/dL    Albumin 3.2 (L) 3.4 - 5.0 g/dL    Protein Total 7.2 6.8 - 8.8 g/dL    Alkaline Phosphatase 38 (L) 40 - 150 U/L    ALT 24 0 - 50 U/L    AST 19  0 - 45 U/L

## 2017-11-22 NOTE — NURSING NOTE
Chief Complaint   Patient presents with     Port Draw     labs drawn viaport by RN     /75 (BP Location: Right arm, Patient Position: Chair, Cuff Size: Adult Regular)  Pulse 94  Temp 98  F (36.7  C) (Oral)  Wt 78.8 kg (173 lb 12.8 oz)  SpO2 98%  BMI 30.8 kg/m2    Vitals taken. Port accessed by RN. Labs collected and sent. Line flushed with NS & Heparin. Pt tolerated well. Pt checked in for next appointment.    Marlene Alonso RN

## 2017-11-22 NOTE — PROGRESS NOTES
Infusion Nursing Note:  Hoda Brush presents today for cycle 4 day 1 Herceptin.    Patient seen by provider today: Yes: Anne Lea PA-C   present during visit today: Yes    Note: Proceed with treatment.    Intravenous Access:  Implanted Port.    Treatment Conditions:  Lab Results   Component Value Date    HGB 11.6 11/22/2017     Lab Results   Component Value Date    WBC 6.8 11/22/2017      Lab Results   Component Value Date    ANEU 3.2 11/22/2017     Lab Results   Component Value Date     11/22/2017      Lab Results   Component Value Date     11/22/2017                   Lab Results   Component Value Date    POTASSIUM 3.9 11/22/2017           No results found for: MAG         Lab Results   Component Value Date    CR 0.72 11/22/2017                   Lab Results   Component Value Date    TAYLER 8.9 11/22/2017                Lab Results   Component Value Date    BILITOTAL 0.3 11/22/2017           Lab Results   Component Value Date    ALBUMIN 3.2 11/22/2017                    Lab Results   Component Value Date    ALT 24 11/22/2017           Lab Results   Component Value Date    AST 19 11/22/2017     Results reviewed, labs MET treatment parameters, ok to proceed with treatment.  ECHO/MUGA completed 8/29  EF 60-65%.          Post Infusion Assessment:  Patient tolerated infusion without incident.  Blood return noted pre and post infusion.  Site patent and intact, free from redness, edema or discomfort.  No evidence of extravasations.  Access discontinued per protocol.    Discharge Plan:   Patient declined prescription refills.  Discharge instructions reviewed with: Patient.  Patient and/or family verbalized understanding of discharge instructions and all questions answered.  Copy of AVS reviewed with patient and/or family.  Patient will return 12/12/17 for next appointment.  Patient discharged in stable condition accompanied by: .  Departure Mode: Ambulatory.    Marycarmen BOJORQUEZ  Anthony RN

## 2017-11-22 NOTE — PROGRESS NOTES
"Hematology-Oncology Visit  Nov 22, 2017    Reason for Visit: follow-up metastatic breast cancer     HPI: Hoda Brush is a 62 year old female with metastatic ER positive, HER 2 positive left breast cancer with bone mets. She recently came to the  as a refugee and established care with Dr. Aguiar. She resumed treatment with Herceptin and is on daily tamoxifen. She is on Xgeva as well. Her last dose of Herceptin was on 9/19. She was admitted 9/20-9/21 with LUE cellulitis with leukocytosis. Given a dose of IV Ancef and discharged on keflex.    She presents prior to her next dose of Herceptin.      Interval History: Hoda is here with a professional [place]  and feels well. She has no concerns today. She has had some intermittent leg cramps at night that affect both legs. She denies any edema. No tenderness or erythema. She denies any further erythema or pain from left arm. Has very minimal edema. She went to lymphedema and they recommended daily treatments x 14 days so she has had a hard time making time for treatments. No fevers/chills. She is eating and drinking well. No nausea or bowel changes. No rash, cough, SOB, CP. ROS otherwise negative.     Current Outpatient Prescriptions   Medication     tamoxifen (NOLVADEX) 20 MG tablet     VITAMIN D, CHOLECALCIFEROL, PO     cephALEXin (KEFLEX) 500 MG capsule     order for DME     No current facility-administered medications for this visit.      Facility-Administered Medications Ordered in Other Visits   Medication     TRASTuzumab (HERCEPTIN) 478 mg in NaCl 0.9 % 298 mL     sodium chloride (PF) 0.9% PF flush 10 mL     heparin 100 UNIT/ML injection 5 mL     0.9% sodium chloride BOLUS       PHYSICAL EXAM:  /75 (BP Location: Right arm, Patient Position: Chair, Cuff Size: Adult Regular)  Pulse 94  Temp 98  F (36.7  C) (Oral)  Ht 1.575 m (5' 2\")  Wt 78.8 kg (173 lb 12.8 oz)  SpO2 98%  BMI 31.79 kg/m2  General: Alert, oriented, pleasant, " NAD  HEENT: NC/AT, no scleral icterus. MMM, no lesions or thrush  Neck: Supple, no cervical or supraclavicular adenopathy.  Axillary: No axillary adenopathy.   Lungs: CTA bilaterally, normal work of breathing  Cardiac: RRR, S1, S2, no murmurs  Abdomen: Soft, nontender, nondistended, + BS, no palpable masses  MSK: No tenderness to palpation of lower legs b/l. Negative Yoanna's sign.   Extremities: No pedal edema.   Skin: Left arm has very minimal edema compared to R. No skin changes.    Labs:    11/22/2017 10:04   Sodium 142   Potassium 3.9   Chloride 108   Carbon Dioxide 28   Urea Nitrogen 15   Creatinine 0.72   GFR Estimate 82   GFR Estimate If Black >90   Calcium 8.9   Anion Gap 7   Albumin 3.2 (L)   Protein Total 7.2   Bilirubin Total 0.3   Alkaline Phosphatase 38 (L)   ALT 24   AST 19   Glucose 81   WBC 6.8   Hemoglobin 11.6 (L)   Hematocrit 36.3   Platelet Count 211   RBC Count 3.83   MCV 95   MCH 30.3   MCHC 32.0   RDW 13.3   Diff Method Automated Method   % Neutrophils 47.9   % Lymphocytes 39.1   % Monocytes 5.9   % Eosinophils 6.6   % Basophils 0.4   % Immature Granulocytes 0.1   Nucleated RBCs 0   Absolute Neutrophil 3.2   Absolute Lymphocytes 2.7   Absolute Monocytes 0.4   Absolute Eosinophils 0.5   Absolute Basophils 0.0   Abs Immature Granulocytes 0.0   Absolute Nucleated RBC 0.0       Assessment & Plan:     1. Metastatic breast cancer, ER, HER2+ positive: Was last treated in [facility] with Herceptin. We have continued Herceptin every 3 weeks as well as daily tamoxifen. She tolerates treatment extremely well without overt symptoms. Echocardiogram due prior to next infusion. Continue tamoxifen every 3 weeks. Plan to restage end of December.     2. Bone metastasis: Continue xgeva every 6 weeks. Due at next visit. Take calcium 1200 mg daily. She is on high dose vitamin D3 5000 IU daily per direction of Dr. Aguiar.     3. RUSSELL lymphedema: Had consult with lymphedema. Patient will schedule treatments.     4.  Intermittent leg cramps: No edema or tenderness on exam today. Likely related to mild dehydration. Push fluids.       Anne Lea PA-C    [facility] [address] [id number] [phone]

## 2017-11-22 NOTE — Clinical Note
11/22/2017       RE: Hoda Brush  [address] [address]     Dear Colleague,    Thank you for referring your patient, Hoda Brush, to the [facility]. Please see a copy of my visit note below.    No notes on file    Again, thank you for allowing me to participate in the care of your patient.      Sincerely,    SEMAJ Walls

## 2017-11-22 NOTE — LETTER
"11/22/2017      RE: Hoda Brush  [address] [address]       Hematology-Oncology Visit  Nov 22, 2017    Reason for Visit: follow-up metastatic breast cancer     HPI: Hoda Brush is a 62 year old female with metastatic ER positive, HER 2 positive left breast cancer with bone mets. She recently came to the  as a refugee and established care with Dr. Aguiar. She resumed treatment with Herceptin and is on daily tamoxifen. She is on Xgeva as well. Her last dose of Herceptin was on 9/19. She was admitted 9/20-9/21 with LUE cellulitis with leukocytosis. Given a dose of IV Ancef and discharged on keflex.    She presents prior to her next dose of Herceptin.      Interval History: Hoda is here with a professional [place]  and feels well. She has no concerns today. She has had some intermittent leg cramps at night that affect both legs. She denies any edema. No tenderness or erythema. She denies any further erythema or pain from left arm. Has very minimal edema. She went to lymphedema and they recommended daily treatments x 14 days so she has had a hard time making time for treatments. No fevers/chills. She is eating and drinking well. No nausea or bowel changes. No rash, cough, SOB, CP. ROS otherwise negative.     Current Outpatient Prescriptions   Medication     tamoxifen (NOLVADEX) 20 MG tablet     VITAMIN D, CHOLECALCIFEROL, PO     cephALEXin (KEFLEX) 500 MG capsule     order for DME     No current facility-administered medications for this visit.      Facility-Administered Medications Ordered in Other Visits   Medication     TRASTuzumab (HERCEPTIN) 478 mg in NaCl 0.9 % 298 mL     sodium chloride (PF) 0.9% PF flush 10 mL     heparin 100 UNIT/ML injection 5 mL     0.9% sodium chloride BOLUS       PHYSICAL EXAM:  /75 (BP Location: Right arm, Patient Position: Chair, Cuff Size: Adult Regular)  Pulse 94  Temp 98  F (36.7  C) (Oral)  Ht 1.575 m (5' 2\")  Wt 78.8 kg " (173 lb 12.8 oz)  SpO2 98%  BMI 31.79 kg/m2  General: Alert, oriented, pleasant, NAD  HEENT: NC/AT, no scleral icterus. MMM, no lesions or thrush  Neck: Supple, no cervical or supraclavicular adenopathy.  Axillary: No axillary adenopathy.   Lungs: CTA bilaterally, normal work of breathing  Cardiac: RRR, S1, S2, no murmurs  Abdomen: Soft, nontender, nondistended, + BS, no palpable masses  MSK: No tenderness to palpation of lower legs b/l. Negative Yoanna's sign.   Extremities: No pedal edema.   Skin: Left arm has very minimal edema compared to R. No skin changes.    Labs:    11/22/2017 10:04   Sodium 142   Potassium 3.9   Chloride 108   Carbon Dioxide 28   Urea Nitrogen 15   Creatinine 0.72   GFR Estimate 82   GFR Estimate If Black >90   Calcium 8.9   Anion Gap 7   Albumin 3.2 (L)   Protein Total 7.2   Bilirubin Total 0.3   Alkaline Phosphatase 38 (L)   ALT 24   AST 19   Glucose 81   WBC 6.8   Hemoglobin 11.6 (L)   Hematocrit 36.3   Platelet Count 211   RBC Count 3.83   MCV 95   MCH 30.3   MCHC 32.0   RDW 13.3   Diff Method Automated Method   % Neutrophils 47.9   % Lymphocytes 39.1   % Monocytes 5.9   % Eosinophils 6.6   % Basophils 0.4   % Immature Granulocytes 0.1   Nucleated RBCs 0   Absolute Neutrophil 3.2   Absolute Lymphocytes 2.7   Absolute Monocytes 0.4   Absolute Eosinophils 0.5   Absolute Basophils 0.0   Abs Immature Granulocytes 0.0   Absolute Nucleated RBC 0.0       Assessment & Plan:     1. Metastatic breast cancer, ER, HER2+ positive: Was last treated in [facility] with Herceptin. We have continued Herceptin every 3 weeks as well as daily tamoxifen. She tolerates treatment extremely well without overt symptoms. Echocardiogram due prior to next infusion. Continue tamoxifen every 3 weeks. Plan to restage end of December.     2. Bone metastasis: Continue xgeva every 6 weeks. Due at next visit. Take calcium 1200 mg daily. She is on high dose vitamin D3 5000 IU daily per direction of Dr. Aguiar.     3. RUSSELL  lymphedema: Had consult with lymphedema. Patient will schedule treatments.     4. Intermittent leg cramps: No edema or tenderness on exam today. Likely related to mild dehydration. Push fluids.       Anne Lea PA-C    [facility] [address] [address] [phone]

## 2017-12-12 ENCOUNTER — ONCOLOGY VISIT (OUTPATIENT)
Dept: ONCOLOGY | Facility: CLINIC | Age: 62
End: 2017-12-12
Attending: INTERNAL MEDICINE
Payer: COMMERCIAL

## 2017-12-12 ENCOUNTER — RADIANT APPOINTMENT (OUTPATIENT)
Dept: CARDIOLOGY | Facility: CLINIC | Age: 62
End: 2017-12-12
Attending: INTERNAL MEDICINE

## 2017-12-12 VITALS
SYSTOLIC BLOOD PRESSURE: 112 MMHG | OXYGEN SATURATION: 97 % | TEMPERATURE: 98.6 F | HEART RATE: 76 BPM | WEIGHT: 174.7 LBS | RESPIRATION RATE: 16 BRPM | DIASTOLIC BLOOD PRESSURE: 70 MMHG | BODY MASS INDEX: 31.95 KG/M2

## 2017-12-12 DIAGNOSIS — C50.919 METASTATIC BREAST CANCER: Primary | ICD-10-CM

## 2017-12-12 DIAGNOSIS — C50.912 MALIGNANT NEOPLASM OF LEFT FEMALE BREAST, UNSPECIFIED ESTROGEN RECEPTOR STATUS, UNSPECIFIED SITE OF BREAST (H): ICD-10-CM

## 2017-12-12 DIAGNOSIS — Z51.11 ENCOUNTER FOR ANTINEOPLASTIC CHEMOTHERAPY: ICD-10-CM

## 2017-12-12 DIAGNOSIS — C50.912 MALIGNANT NEOPLASM OF LEFT FEMALE BREAST, UNSPECIFIED ESTROGEN RECEPTOR STATUS, UNSPECIFIED SITE OF BREAST (H): Primary | ICD-10-CM

## 2017-12-12 DIAGNOSIS — C79.51 BONE METASTASIS: ICD-10-CM

## 2017-12-12 LAB
ALBUMIN SERPL-MCNC: 3.2 G/DL (ref 3.4–5)
ALP SERPL-CCNC: 38 U/L (ref 40–150)
ALT SERPL W P-5'-P-CCNC: 19 U/L (ref 0–50)
ANION GAP SERPL CALCULATED.3IONS-SCNC: 5 MMOL/L (ref 3–14)
AST SERPL W P-5'-P-CCNC: 23 U/L (ref 0–45)
BASOPHILS # BLD AUTO: 0 10E9/L (ref 0–0.2)
BASOPHILS NFR BLD AUTO: 0.4 %
BILIRUB SERPL-MCNC: 0.2 MG/DL (ref 0.2–1.3)
BUN SERPL-MCNC: 12 MG/DL (ref 7–30)
CALCIUM SERPL-MCNC: 8 MG/DL (ref 8.5–10.1)
CANCER AG27-29 SERPL-ACNC: <5 U/ML (ref 0–39)
CEA SERPL-MCNC: 0.6 UG/L (ref 0–2.5)
CHLORIDE SERPL-SCNC: 107 MMOL/L (ref 94–109)
CO2 SERPL-SCNC: 29 MMOL/L (ref 20–32)
CREAT SERPL-MCNC: 0.71 MG/DL (ref 0.52–1.04)
DIFFERENTIAL METHOD BLD: ABNORMAL
EOSINOPHIL # BLD AUTO: 0.3 10E9/L (ref 0–0.7)
EOSINOPHIL NFR BLD AUTO: 4.6 %
ERYTHROCYTE [DISTWIDTH] IN BLOOD BY AUTOMATED COUNT: 13.2 % (ref 10–15)
GFR SERPL CREATININE-BSD FRML MDRD: 84 ML/MIN/1.7M2
GLUCOSE SERPL-MCNC: 126 MG/DL (ref 70–99)
HCT VFR BLD AUTO: 36.4 % (ref 35–47)
HGB BLD-MCNC: 11.5 G/DL (ref 11.7–15.7)
IMM GRANULOCYTES # BLD: 0 10E9/L (ref 0–0.4)
IMM GRANULOCYTES NFR BLD: 0.1 %
LYMPHOCYTES # BLD AUTO: 3.2 10E9/L (ref 0.8–5.3)
LYMPHOCYTES NFR BLD AUTO: 45.6 %
MCH RBC QN AUTO: 30.6 PG (ref 26.5–33)
MCHC RBC AUTO-ENTMCNC: 31.6 G/DL (ref 31.5–36.5)
MCV RBC AUTO: 97 FL (ref 78–100)
MONOCYTES # BLD AUTO: 0.5 10E9/L (ref 0–1.3)
MONOCYTES NFR BLD AUTO: 7 %
NEUTROPHILS # BLD AUTO: 2.9 10E9/L (ref 1.6–8.3)
NEUTROPHILS NFR BLD AUTO: 42.3 %
NRBC # BLD AUTO: 0 10*3/UL
NRBC BLD AUTO-RTO: 0 /100
PLATELET # BLD AUTO: 184 10E9/L (ref 150–450)
POTASSIUM SERPL-SCNC: 3.8 MMOL/L (ref 3.4–5.3)
PROT SERPL-MCNC: 7.2 G/DL (ref 6.8–8.8)
RBC # BLD AUTO: 3.76 10E12/L (ref 3.8–5.2)
SODIUM SERPL-SCNC: 141 MMOL/L (ref 133–144)
WBC # BLD AUTO: 7 10E9/L (ref 4–11)

## 2017-12-12 PROCEDURE — 80053 COMPREHEN METABOLIC PANEL: CPT | Performed by: INTERNAL MEDICINE

## 2017-12-12 PROCEDURE — 25000132 ZZH RX MED GY IP 250 OP 250 PS 637: Mod: ZF | Performed by: INTERNAL MEDICINE

## 2017-12-12 PROCEDURE — 25000128 H RX IP 250 OP 636: Mod: ZF | Performed by: PHYSICIAN ASSISTANT

## 2017-12-12 PROCEDURE — 99214 OFFICE O/P EST MOD 30 MIN: CPT | Mod: ZP | Performed by: PHYSICIAN ASSISTANT

## 2017-12-12 PROCEDURE — 86300 IMMUNOASSAY TUMOR CA 15-3: CPT | Performed by: INTERNAL MEDICINE

## 2017-12-12 PROCEDURE — T1013 SIGN LANG/ORAL INTERPRETER: HCPCS | Mod: U3

## 2017-12-12 PROCEDURE — 82378 CARCINOEMBRYONIC ANTIGEN: CPT | Performed by: INTERNAL MEDICINE

## 2017-12-12 PROCEDURE — 99212 OFFICE O/P EST SF 10 MIN: CPT | Mod: ZF

## 2017-12-12 PROCEDURE — 25000128 H RX IP 250 OP 636: Mod: ZF | Performed by: INTERNAL MEDICINE

## 2017-12-12 PROCEDURE — T1013 SIGN LANG/ORAL INTERPRETER: HCPCS | Mod: U3,ZF

## 2017-12-12 PROCEDURE — 96372 THER/PROPH/DIAG INJ SC/IM: CPT | Mod: 59

## 2017-12-12 PROCEDURE — 96413 CHEMO IV INFUSION 1 HR: CPT

## 2017-12-12 PROCEDURE — 85025 COMPLETE CBC W/AUTO DIFF WBC: CPT | Performed by: INTERNAL MEDICINE

## 2017-12-12 RX ORDER — ALBUTEROL SULFATE 90 UG/1
1-2 AEROSOL, METERED RESPIRATORY (INHALATION)
Status: CANCELLED
Start: 2017-12-12

## 2017-12-12 RX ORDER — HEPARIN SODIUM (PORCINE) LOCK FLUSH IV SOLN 100 UNIT/ML 100 UNIT/ML
5 SOLUTION INTRAVENOUS
Status: COMPLETED | OUTPATIENT
Start: 2017-12-12 | End: 2017-12-12

## 2017-12-12 RX ORDER — ACETAMINOPHEN 325 MG/1
650 TABLET ORAL
Status: CANCELLED | OUTPATIENT
Start: 2017-12-12

## 2017-12-12 RX ORDER — EPINEPHRINE 0.3 MG/.3ML
0.3 INJECTION SUBCUTANEOUS EVERY 5 MIN PRN
Status: CANCELLED | OUTPATIENT
Start: 2017-12-12

## 2017-12-12 RX ORDER — DIPHENHYDRAMINE HYDROCHLORIDE 50 MG/ML
50 INJECTION INTRAMUSCULAR; INTRAVENOUS
Status: CANCELLED
Start: 2017-12-12

## 2017-12-12 RX ORDER — METHYLPREDNISOLONE SODIUM SUCCINATE 125 MG/2ML
125 INJECTION, POWDER, LYOPHILIZED, FOR SOLUTION INTRAMUSCULAR; INTRAVENOUS
Status: CANCELLED
Start: 2017-12-12

## 2017-12-12 RX ORDER — SODIUM CHLORIDE 9 MG/ML
1000 INJECTION, SOLUTION INTRAVENOUS CONTINUOUS PRN
Status: CANCELLED
Start: 2017-12-12

## 2017-12-12 RX ORDER — LORAZEPAM 2 MG/ML
0.5 INJECTION INTRAMUSCULAR EVERY 4 HOURS PRN
Status: CANCELLED
Start: 2017-12-12

## 2017-12-12 RX ORDER — DIPHENHYDRAMINE HCL 25 MG
50 CAPSULE ORAL ONCE
Status: CANCELLED
Start: 2017-12-12

## 2017-12-12 RX ORDER — EPINEPHRINE 1 MG/ML
0.3 INJECTION, SOLUTION, CONCENTRATE INTRAVENOUS EVERY 5 MIN PRN
Status: CANCELLED | OUTPATIENT
Start: 2017-12-12

## 2017-12-12 RX ORDER — HEPARIN SODIUM (PORCINE) LOCK FLUSH IV SOLN 100 UNIT/ML 100 UNIT/ML
5 SOLUTION INTRAVENOUS EVERY 8 HOURS
Status: DISCONTINUED | OUTPATIENT
Start: 2017-12-12 | End: 2017-12-12 | Stop reason: HOSPADM

## 2017-12-12 RX ORDER — MEPERIDINE HYDROCHLORIDE 25 MG/ML
25 INJECTION INTRAMUSCULAR; INTRAVENOUS; SUBCUTANEOUS EVERY 30 MIN PRN
Status: CANCELLED | OUTPATIENT
Start: 2017-12-12

## 2017-12-12 RX ORDER — ALBUTEROL SULFATE 0.83 MG/ML
2.5 SOLUTION RESPIRATORY (INHALATION)
Status: CANCELLED | OUTPATIENT
Start: 2017-12-12

## 2017-12-12 RX ORDER — HEPARIN SODIUM (PORCINE) LOCK FLUSH IV SOLN 100 UNIT/ML 100 UNIT/ML
5 SOLUTION INTRAVENOUS EVERY 8 HOURS
Status: CANCELLED | OUTPATIENT
Start: 2017-12-12

## 2017-12-12 RX ORDER — DIPHENHYDRAMINE HCL 25 MG
50 CAPSULE ORAL ONCE
Status: DISCONTINUED | OUTPATIENT
Start: 2017-12-12 | End: 2017-12-12 | Stop reason: HOSPADM

## 2017-12-12 RX ADMIN — SODIUM CHLORIDE, PRESERVATIVE FREE 5 ML: 5 INJECTION INTRAVENOUS at 17:34

## 2017-12-12 RX ADMIN — DENOSUMAB 120 MG: 120 INJECTION SUBCUTANEOUS at 16:51

## 2017-12-12 RX ADMIN — TRASTUZUMAB 450 MG: 150 INJECTION, POWDER, LYOPHILIZED, FOR SOLUTION INTRAVENOUS at 16:57

## 2017-12-12 RX ADMIN — SODIUM CHLORIDE, PRESERVATIVE FREE 5 ML: 5 INJECTION INTRAVENOUS at 14:01

## 2017-12-12 ASSESSMENT — PAIN SCALES - GENERAL: PAINLEVEL: NO PAIN (0)

## 2017-12-12 NOTE — MR AVS SNAPSHOT
After Visit Summary   12/12/2017    Hoda Brush    MRN: [id number]           Patient Information     Date Of Birth          1955        Visit Information        Provider Department      12/12/2017 3:30 PM Wesley Iraheta;  16 ATC;  ONCOLOGY INFUSION  Services Department        Today's Diagnoses     Malignant neoplasm of left female breast, unspecified estrogen receptor status, unspecified site of breast (H)    -  1    Bone metastasis (H)          Care Instructions    Contact Numbers    [facility] Main Line: [phone]  [facility] Triage:  [phone]    Call triage with chills and/or temperature greater than or equal to 100.5, uncontrolled nausea/vomiting, diarrhea, constipation, dizziness, shortness of breath, chest pain, bleeding, unexplained bruising, or any new/concerning symptoms, questions/concerns.     If you are having any concerning symptoms or wish to speak to a provider before your next infusion visit, please call your care coordinator or triage to notify them so we can adequately serve you.       After Hours: [phone]    If after hours, weekends, or holidays, call main hospital  and ask for Oncology doctor on call.           December 2017 Sunday Monday Tuesday Wednesday Thursday Friday Saturday                            1     2       3     4     5     6     7     8     9       10     11     12     ECH COMPLETE   10:45 AM   (75 min.)   [id number]   [facility] LAB DRAW    2:00 PM   (30 min.)   [facility] LAB DRAW   [facility] Lab Draw     [facility] RETURN    2:25 PM   (90 min.)   Anne Lea PA   [facility] ONC INFUSION 60    3:30 PM   (90 min.)    ONCOLOGY INFUSION   [facility] 13     14     15     16       17     18     19     20     21     PET ONCOLOGY WHOLE BODY    9:00 AM   (45 min.)   UUPET1   [facility] PET CT 22     23       24     25     26     27     28     29      30       31 January 2018 Sunday Monday Tuesday Wednesday Thursday Friday Saturday        1     2     [facility] DRAW   12:00 PM   (15 min.)    [facility] DRAW   [facility] Lab Draw     [facility] RETURN   12:15 PM   (30 min.)   Lisandro Aguiar MD   [facility]     [facility] ONC INFUSION 60    1:30 PM   (60 min.)   [facility] ONCOLOGY INFUSION   [facility] 3     4     5     6       7     8     LYMPHEDEMA TREATMENT    1:30 PM   (75 min.)   Sarai Potter, OT   [facility] OT 9     [facility] RETURN   12:15 PM   (30 min.)   Lisandro Aguiar MD   [facility]     LYMPHEDEMA TREATMENT    2:30 PM   (75 min.)   China Dent, OT   [facility] 10     LYMPHEDEMA TREATMENT    1:30 PM   (75 min.)   Sarai Potter, OT   [facility] 11     LYMPHEDEMA TREATMENT    1:30 PM   (75 min.)   Sarai Potter, OT   [facility] 12     LYMPHEDEMA TREATMENT   12:00 PM   (75 min.)   China Dent, OT   [facility] 13       14     15     LYMPHEDEMA TREATMENT    1:30 PM   (75 min.)   Sarai Potter, OT   [facility] 16     LYMPHEDEMA TREATMENT    1:30 PM   (75 min.)   China Dent, OT   [facility] 17     LYMPHEDEMA TREATMENT    1:30 PM   (75 min.)   Sarai Potter OT   [facility] 18     LYMPHEDEMA TREATMENT    1:30 PM   (75 min.)   Sarai Potter, OT   [facility] 19     LYMPHEDEMA TREATMENT   12:00 PM   (75 min.)   China Dent, OT   [facility] 20       21     22     23     24     25     26     27       28     29     30     31                                 Lab Results:  Recent Results (from the past 12 hour(s))   CBC with platelets differential    Collection Time: 12/12/17  2:09 PM   Result  Value Ref Range    WBC 7.0 4.0 - 11.0 10e9/L    RBC Count 3.76 (L) 3.8 - 5.2 10e12/L    Hemoglobin 11.5 (L) 11.7 - 15.7 g/dL    Hematocrit 36.4 35.0 - 47.0 %    MCV 97 78 - 100 fl    MCH 30.6 26.5 - 33.0 pg    MCHC 31.6 31.5 - 36.5 g/dL    RDW 13.2 10.0 - 15.0 %    Platelet Count 184 150 - 450 10e9/L    Diff Method Automated Method     % Neutrophils 42.3 %    % Lymphocytes 45.6 %    % Monocytes 7.0 %    % Eosinophils 4.6 %    % Basophils 0.4 %    % Immature Granulocytes 0.1 %    Nucleated RBCs 0 0 /100    Absolute Neutrophil 2.9 1.6 - 8.3 10e9/L    Absolute Lymphocytes 3.2 0.8 - 5.3 10e9/L    Absolute Monocytes 0.5 0.0 - 1.3 10e9/L    Absolute Eosinophils 0.3 0.0 - 0.7 10e9/L    Absolute Basophils 0.0 0.0 - 0.2 10e9/L    Abs Immature Granulocytes 0.0 0 - 0.4 10e9/L    Absolute Nucleated RBC 0.0    Comprehensive metabolic panel    Collection Time: 12/12/17  2:09 PM   Result Value Ref Range    Sodium 141 133 - 144 mmol/L    Potassium 3.8 3.4 - 5.3 mmol/L    Chloride 107 94 - 109 mmol/L    Carbon Dioxide 29 20 - 32 mmol/L    Anion Gap 5 3 - 14 mmol/L    Glucose 126 (H) 70 - 99 mg/dL    Urea Nitrogen 12 7 - 30 mg/dL    Creatinine 0.71 0.52 - 1.04 mg/dL    GFR Estimate 84 >60 mL/min/1.7m2    GFR Estimate If Black >90 >60 mL/min/1.7m2    Calcium 8.0 (L) 8.5 - 10.1 mg/dL    Bilirubin Total 0.2 0.2 - 1.3 mg/dL    Albumin 3.2 (L) 3.4 - 5.0 g/dL    Protein Total 7.2 6.8 - 8.8 g/dL    Alkaline Phosphatase 38 (L) 40 - 150 U/L    ALT 19 0 - 50 U/L    AST 23 0 - 45 U/L   CA 27.29 Breast tumor marker    Collection Time: 12/12/17  2:09 PM   Result Value Ref Range    CA 27-29 <5 0 - 39 U/mL   CEA    Collection Time: 12/12/17  2:09 PM   Result Value Ref Range    CEA 0.6 0 - 2.5 ug/L               Follow-ups after your visit        Your next 10 appointments already scheduled     Dec 21, 2017  9:00 AM CST   PET ONCOLOGY WHOLE BODY with UUPET1   [facility] PET CT ([facility])    [address]  [facility] [address]   [phone]           Tell your doctor:   If there is any chance you may be pregnant or if you are breastfeeding.   If you have problems lying in small spaces (claustrophobia). If you do, your doctor may give you medicine to help you relax. If you have diabetes:   Have your exam early in the morning. Your blood glucose will go up as the day goes by.   Your glucose level must be 180 or less at the start of the exam. Please take any medicines you need to ensure this blood glucose level. 24 hours before your scan: Don t do any heavy exercise. (No jogging, aerobics or other workouts.) Exercise will make your pictures less accurate. 6 hours before your scan:   Stop all food and liquids (except water).   Do not chew gum or suck on mints.   If you need to take medicine with food, you may take it with a few crackers.  Please call your Imaging Department at your exam site with any questions.            Jan 02, 2018 12:00 PM CST   [facility] Draw with [facility] DRAW   [facility] Lab Draw ([facility])    [address] [place] [id number]   [phone]            Jan 02, 2018 12:30 PM CST   (Arrive by 12:15 PM)   Return Visit with Lisandro Aguiar MD   [facility] ([facility])    [address] [place] [address]   [phone]            Jan 02, 2018  1:30 PM CST   Infusion 60 with  ONCOLOGY INFUSION, UC 28 ATC   [facility])    [address] [place] [id number]   [phone]            Jan 08, 2018  1:30 PM CST   Lymphedema Treatment with Sarai Potter OT   [facility] Lymphedema OT ([facility])    [address]  [address]  [facility] [address]   [phone]            Jan 09, 2018 12:30 PM CST   (Arrive by 12:15 PM)   Return Visit with Lisandro  Meng Aguiar MD   [facility] ([facility])    [address]  [address]  [facility] [address]   [phone]            Jan 09, 2018  2:30 PM CST   Lymphedema Treatment with China Dent, OT   [facility] ([facility])    [address]  [address]  [place] [address]   [phone]            Rex 10, 2018  1:30 PM CST   Lymphedema Treatment with Sarai Potter, OT   [facility] ([facility])    [address]  [address]  [place] [address]   [phone]            Jan 11, 2018  1:30 PM CST   Lymphedema Treatment with Sarai Potter, OT   [facility] Lymphedema OT ([facility])    [address]  [address]  [facility] [address]   [phone]              Who to contact     If you have questions or need follow up information about today's clinic visit or your schedule please contact [facility] directly at [phone].  Normal or non-critical lab and imaging results will be communicated to you by autoGraphhart, letter or phone within 4 business days after the clinic has received the results. If you do not hear from us within 7 days, please contact the clinic through autoGraphhart or phone. If you have a critical or abnormal lab result, we will notify you by phone as soon as possible.  Submit refill requests through Generate or call your pharmacy and they will forward the refill request to us. Please allow 3 business days for your refill to be completed.          Additional Information About Your Visit        autoGraphhart Information     Generate gives you secure access to your electronic health record. If you see a primary care provider, you can also send messages to your care team and make appointments. If you have questions, please call your primary care clinic.  If you do not have a primary care provider, please call [phone] and they will assist you.        Care  EveryWhere ID     This is your Care EveryWhere ID. This could be used by other organizations to access your [facility] medical records  [id number]         Blood Pressure from Last 3 Encounters:   12/12/17 112/70   11/22/17 122/75   10/31/17 126/78    Weight from Last 3 Encounters:   12/12/17 79.2 kg (174 lb 11.2 oz)   11/22/17 78.8 kg (173 lb 12.8 oz)   10/31/17 79.2 kg (174 lb 9.6 oz)              We Performed the Following     CA 27.29 Breast tumor marker     CBC with platelets differential     CEA     Comprehensive metabolic panel        Primary Care Provider Fax #    Physician No Ref-Primary [phone]       No address on file        Equal Access to Services     NOE VAZQUEZ : Luz Marina Ramsey, wamikki jefferson, qaybjoan kaalmada franky, alexa beltran. So [facility] [phone].    ATENCIÓN: Si habla español, tiene a lagunas disposición servicios gratuitos de asistencia lingüística. Llame al [phone].    We comply with applicable federal civil rights laws and [place] laws. We do not discriminate on the basis of race, color, national origin, age, disability, sex, sexual orientation, or gender identity.            Thank you!     Thank you for choosing [facility]  for your care. Our goal is always to provide you with excellent care. Hearing back from our patients is one way we can continue to improve our services. Please take a few minutes to complete the written survey that you may receive in the mail after your visit with us. Thank you!             Your Updated Medication List - Protect others around you: Learn how to safely use, store and throw away your medicines at www.disposemymeds.org.          This list is accurate as of: 12/12/17  5:15 PM.  Always use your most recent med list.                   Brand Name Dispense Instructions for use Diagnosis    cephALEXin 500 MG capsule    KEFLEX    56 capsule    Take 1 capsule (500 mg) by mouth every 6 hours     Cellulitis of left upper extremity       order for DME     1 each    L UE class 2 compression sleeve and gauntlet Night garment Bandaging supplies    Lymphedema of left upper extremity       tamoxifen 20 MG tablet    NOLVADEX    90 tablet    Take 1 tablet (20 mg) by mouth daily    Cancer of central portion of left breast (H)       VITAMIN D (CHOLECALCIFEROL) PO      Take 1,000 Units by mouth daily

## 2017-12-12 NOTE — LETTER
12/12/2017      RE: Hoda Brush  [address] [facility] [address]       Hematology-Oncology Visit  Dec 12, 2017    Reason for Visit: follow-up metastatic breast cancer     HPI: Hoda Brush is a 62 year old female with metastatic ER positive, HER 2 positive left breast cancer with bone mets. She recently came to the  as a refugee and established care with Dr. Aguiar. She resumed treatment with Herceptin and is on daily tamoxifen. She is on Xgeva as well. Her last dose of Herceptin was on 9/19. She was admitted 9/20-9/21 with LUE cellulitis with leukocytosis. Given a dose of IV Ancef and discharged on keflex.    She presents prior to her next dose of Herceptin.      Interval History: Hoda is here with a professional Dominican  and feels well. She has no concerns today. She states intermittent leg cramps that were bothering her at night resolved with drinking more water. She denies any erythema or pain from left arm and will start lymphedema therapy in January. She takes walks with her  about 30 minutes daily. No fevers/chills. She is eating and drinking well. No nausea or bowel changes. No rash, cough, SOB, CP. ROS otherwise negative.     Current Outpatient Prescriptions   Medication     tamoxifen (NOLVADEX) 20 MG tablet     VITAMIN D, CHOLECALCIFEROL, PO     cephALEXin (KEFLEX) 500 MG capsule     order for DME     Current Facility-Administered Medications   Medication     sodium chloride (PF) 0.9% PF flush 20 mL       PHYSICAL EXAM:  /70 (BP Location: Right arm, Patient Position: Sitting, Cuff Size: Adult Large)  Pulse 76  Temp 98.6  F (37  C) (Oral)  Resp 16  Wt 79.2 kg (174 lb 11.2 oz)  SpO2 97%  BMI 31.95 kg/m2  General: Alert, oriented, pleasant, NAD  HEENT: NC/AT, no scleral icterus. MMM, no lesions or thrush  Neck: Supple, no cervical or supraclavicular adenopathy.  Axillary: No axillary adenopathy.   Lungs: CTA bilaterally, normal work of  breathing  Cardiac: RRR, S1, S2, no murmurs  Abdomen: Soft, nontender, nondistended, + BS, no palpable masses  Extremities: No pedal edema.   Skin: Left arm has some edema compared to R. No skin changes.    Labs:      12/12/2017 14:09   Sodium 141   Potassium 3.8   Chloride 107   Carbon Dioxide 29   Urea Nitrogen 12   Creatinine 0.71   GFR Estimate 84   GFR Estimate If Black >90   Calcium 8.0 (L)   Anion Gap 5   Albumin 3.2 (L)   Protein Total 7.2   Bilirubin Total 0.2   Alkaline Phosphatase 38 (L)   ALT 19   AST 23   Glucose 126 (H)   WBC 7.0   Hemoglobin 11.5 (L)   Hematocrit 36.4   Platelet Count 184   RBC Count 3.76 (L)   MCV 97   MCH 30.6   MCHC 31.6   RDW 13.2   Diff Method Automated Method   % Neutrophils 42.3   % Lymphocytes 45.6   % Monocytes 7.0   % Eosinophils 4.6   % Basophils 0.4   % Immature Granulocytes 0.1   Nucleated RBCs 0   Absolute Neutrophil 2.9   Absolute Lymphocytes 3.2   Absolute Monocytes 0.5   Absolute Eosinophils 0.3   Absolute Basophils 0.0   Abs Immature Granulocytes 0.0   Absolute Nucleated RBC 0.0       Assessment & Plan:     1. Metastatic breast cancer, ER, HER2+ positive: Was last treated in [facility] with Herceptin. We have continued Herceptin every 3 weeks as well as daily tamoxifen. She tolerates treatment extremely well without overt symptoms. Echocardiogram today with EF 55-60% and normal LV function.  Continue Herceptin every 3 weeks. Continue Tamoxifen. Plan to restage end of December--PET/CT scheduled 12/21 as patient will be away on 12/29.   --currently has appointment with Dr. Aguiar on 1/2. Keep lab and infusion appointment. Will try to reschedule her with another provider in his absence  --Schedule 1/23 labs, provider visit, Echo, infusion    2. Bone metastasis: Continue xgeva every 6 weeks. Take calcium 1200 mg daily. She is on high dose vitamin D3 5000 IU daily per direction of Dr. Aguiar.   --Xgeva today. Next dose 1/23/18.    3. LUE lymphedema: Had consult with  lymphedema. Will start therapy 1/8/18.     4. Intermittent leg cramps: Resolved with increased fluid intake.    Jossie Sparrow PA-C    The patient was seen in conjunction with Jossie Sparrow PA-C who served as a scribe for today's visit. I have reviewed and edited the above note, and agree with the above findings and plan.    Anne Lea PA-C  [facility] [address] [address]  [phone]

## 2017-12-12 NOTE — PROGRESS NOTES
Infusion Nursing Note:  Hoda Brush presents today for cycle 5, day 1 Herceptin.    Patient seen by provider today: Yes, SEMAJ Crockett   present during visit today: Yes, Gabonese.    Note: Xgeva given today.    Intravenous Access:  Implanted Port.    Treatment Conditions:  Lab Results   Component Value Date    HGB 11.5 12/12/2017     Lab Results   Component Value Date    WBC 7.0 12/12/2017      Lab Results   Component Value Date    ANEU 2.9 12/12/2017     Lab Results   Component Value Date     12/12/2017      Lab Results   Component Value Date     12/12/2017                   Lab Results   Component Value Date    POTASSIUM 3.8 12/12/2017           No results found for: MAG         Lab Results   Component Value Date    CR 0.71 12/12/2017                   Lab Results   Component Value Date    TAYLER 8.0 12/12/2017                Lab Results   Component Value Date    BILITOTAL 0.2 12/12/2017           Lab Results   Component Value Date    ALBUMIN 3.2 12/12/2017                    Lab Results   Component Value Date    ALT 19 12/12/2017           Lab Results   Component Value Date    AST 23 12/12/2017     Results reviewed, labs MET treatment parameters, ok to proceed with treatment.  ECHO/MUGA completed 12/12/2017  EF 55-60%.    Post Infusion Assessment:  Patient tolerated infusion without incident.  Blood return noted pre and post infusion.  Site patent and intact, free from redness, edema or discomfort.  No evidence of extravasations.  Access discontinued per protocol.    Discharge Plan:   Patient declined prescription refills.  Discharge instructions reviewed with: Patient.  Patient and/or family verbalized understanding of discharge instructions and all questions answered.  Copy of AVS reviewed with patient and/or family.  Patient will return 1/2 for next appointment.  Patient discharged in stable condition accompanied by: self.  Departure Mode: Ambulatory.    Gulshan Esquivel,  RN

## 2017-12-12 NOTE — NURSING NOTE
"Chief Complaint   Patient presents with     Port Draw     labs drawn from port by rn.  vs taken     Port accessed with 20 gauge 3/4\" gripper needle and labs drawn by rn.  Port flushed with NS and heparin.  Pt tolerated well.  VS taken.  Pt checked in for next appt.  Olga Hoff RN      "

## 2017-12-12 NOTE — NURSING NOTE
"Oncology Rooming Note    December 12, 2017 2:55 PM   Hoda Brush is a 62 year old female who presents for:    Chief Complaint   Patient presents with     Port Draw     labs drawn from port by rn.  vs taken     Oncology Clinic Visit     Hx of Breast Ca     Initial Vitals: /70 (BP Location: Right arm, Patient Position: Sitting, Cuff Size: Adult Large)  Pulse 76  Temp 98.6  F (37  C) (Oral)  Resp 16  Wt 79.2 kg (174 lb 11.2 oz)  SpO2 97%  BMI 31.95 kg/m2 Estimated body mass index is 31.95 kg/(m^2) as calculated from the following:    Height as of 11/22/17: 1.575 m (5' 2\").    Weight as of this encounter: 79.2 kg (174 lb 11.2 oz). Body surface area is 1.86 meters squared.  No Pain (0) Comment: Data Unavailable   No LMP recorded. Patient is postmenopausal.  Allergies reviewed: Yes  Medications reviewed: Yes    Medications: Medication refills not needed today.  Pharmacy name entered into [employer]:    [id number] [place] [facility] - [place], [place] - [address] [id number] - [place], [place] - [address] AT [facility] & [address] [place] [address] [id number] - [place], [place] - [address] AT  [address] [facility]    Clinical concerns: Tamoxifen. Anne Lea was NOT notified.    7 minutes for nursing intake (face to face time)     Karis Amezcua LPN              "

## 2017-12-12 NOTE — PROGRESS NOTES
Hematology-Oncology Visit  Dec 12, 2017    Reason for Visit: follow-up metastatic breast cancer     HPI: Hoda Brush is a 62 year old female with metastatic ER positive, HER 2 positive left breast cancer with bone mets. She recently came to the  as a refugee and established care with Dr. Aguiar. She resumed treatment with Herceptin and is on daily tamoxifen. She is on Xgeva as well. Her last dose of Herceptin was on 9/19. She was admitted 9/20-9/21 with LUE cellulitis with leukocytosis. Given a dose of IV Ancef and discharged on keflex.    She presents prior to her next dose of Herceptin.      Interval History: Hoda is here with a professional [place]err[place]  and feels well. She has no concerns today. She states intermittent leg cramps that were bothering her at night resolved with drinking more water. She denies any erythema or pain from left arm and will start lymphedema therapy in January. She takes walks with her  about 30 minutes daily. No fevers/chills. She is eating and drinking well. No nausea or bowel changes. No rash, cough, SOB, CP. ROS otherwise negative.     Current Outpatient Prescriptions   Medication     tamoxifen (NOLVADEX) 20 MG tablet     VITAMIN D, CHOLECALCIFEROL, PO     cephALEXin (KEFLEX) 500 MG capsule     order for DME     Current Facility-Administered Medications   Medication     sodium chloride (PF) 0.9% PF flush 20 mL       PHYSICAL EXAM:  /70 (BP Location: Right arm, Patient Position: Sitting, Cuff Size: Adult Large)  Pulse 76  Temp 98.6  F (37  C) (Oral)  Resp 16  Wt 79.2 kg (174 lb 11.2 oz)  SpO2 97%  BMI 31.95 kg/m2  General: Alert, oriented, pleasant, NAD  HEENT: NC/AT, no scleral icterus. MMM, no lesions or thrush  Neck: Supple, no cervical or supraclavicular adenopathy.  Axillary: No axillary adenopathy.   Lungs: CTA bilaterally, normal work of breathing  Cardiac: RRR, S1, S2, no murmurs  Abdomen: Soft, nontender, nondistended, + BS, no palpable  masses  Extremities: No pedal edema.   Skin: Left arm has some edema compared to R. No skin changes.    Labs:      12/12/2017 14:09   Sodium 141   Potassium 3.8   Chloride 107   Carbon Dioxide 29   Urea Nitrogen 12   Creatinine 0.71   GFR Estimate 84   GFR Estimate If Black >90   Calcium 8.0 (L)   Anion Gap 5   Albumin 3.2 (L)   Protein Total 7.2   Bilirubin Total 0.2   Alkaline Phosphatase 38 (L)   ALT 19   AST 23   Glucose 126 (H)   WBC 7.0   Hemoglobin 11.5 (L)   Hematocrit 36.4   Platelet Count 184   RBC Count 3.76 (L)   MCV 97   MCH 30.6   MCHC 31.6   RDW 13.2   Diff Method Automated Method   % Neutrophils 42.3   % Lymphocytes 45.6   % Monocytes 7.0   % Eosinophils 4.6   % Basophils 0.4   % Immature Granulocytes 0.1   Nucleated RBCs 0   Absolute Neutrophil 2.9   Absolute Lymphocytes 3.2   Absolute Monocytes 0.5   Absolute Eosinophils 0.3   Absolute Basophils 0.0   Abs Immature Granulocytes 0.0   Absolute Nucleated RBC 0.0       Assessment & Plan:     1. Metastatic breast cancer, ER, HER2+ positive: Was last treated in [facility] with Herceptin. We have continued Herceptin every 3 weeks as well as daily tamoxifen. She tolerates treatment extremely well without overt symptoms. Echocardiogram today with EF 55-60% and normal LV function.  Continue Herceptin every 3 weeks. Continue Tamoxifen. Plan to restage end of December--PET/CT scheduled 12/21 as patient will be away on 12/29.   --currently has appointment with Dr. Aguiar on 1/2. Keep lab and infusion appointment. Will try to reschedule her with another provider in his absence  --Schedule 1/23 labs, provider visit, Echo, infusion    2. Bone metastasis: Continue xgeva every 6 weeks. Take calcium 1200 mg daily. She is on high dose vitamin D3 5000 IU daily per direction of Dr. Aguiar.   --Xgeva today. Next dose 1/23/18.    3. LUE lymphedema: Had consult with lymphedema. Will start therapy 1/8/18.     4. Intermittent leg cramps: Resolved with increased fluid  intake.    Jossie Sparrow PA-C    The patient was seen in conjunction with Jossie Sparrow PA-C who served as a scribe for today's visit. I have reviewed and edited the above note, and agree with the above findings and plan.    Anne Lea PA-C  [facility] [address] [address]  [phone]

## 2017-12-12 NOTE — MR AVS SNAPSHOT
After Visit Summary   12/12/2017    Hoda Brush    MRN: [id number]           Patient Information     Date Of Birth          1955        Visit Information        Provider Department      12/12/2017 2:25 PM Wesley Iraheta; Anne Lea PA [facility]        Today's Diagnoses     Metastatic breast cancer (H)    -  1    Malignant neoplasm of left female breast, unspecified estrogen receptor status, unspecified site of breast (H)           Follow-ups after your visit        Your next 10 appointments already scheduled     Dec 21, 2017  9:00 AM CST   PET ONCOLOGY WHOLE BODY with UUPET1   [facility] [place] PET CT ([facility])    [address] [facility] [address] [phone]           Tell your doctor:   If there is any chance you may be pregnant or if you are breastfeeding.   If you have problems lying in small spaces (claustrophobia). If you do, your doctor may give you medicine to help you relax. If you have diabetes:   Have your exam early in the morning. Your blood glucose will go up as the day goes by.   Your glucose level must be 180 or less at the start of the exam. Please take any medicines you need to ensure this blood glucose level. 24 hours before your scan: Don t do any heavy exercise. (No jogging, aerobics or other workouts.) Exercise will make your pictures less accurate. 6 hours before your scan:   Stop all food and liquids (except water).   Do not chew gum or suck on mints.   If you need to take medicine with food, you may take it with a few crackers.  Please call your Imaging Department at your exam site with any questions.            Jan 02, 2018 12:00 PM CST   [facility] Draw with  [facility] LAB DRAW   [facility] Lab Draw ([facility])    [address] [facility] [address]  2nd Floor  [facility] [address] [phone]            Jan 02, 2018 12:30 PM CST   (Arrive by  12:15 PM)   Return Visit with Lisandro Aguiar MD   [facility] ([facility])    [address]  [address]nd Floor  [facility] [address] [phone]            Jan 02, 2018  1:30 PM CST   Infusion 60 with UC ONCOLOGY INFUSION, UC 28 ATC   [facility] ([facility])    [address]  2nd Floor  [facility] [address] [phone]            Jan 08, 2018  1:30 PM CST   Lymphedema Treatment with Sarai Potter, OT   [facility] ([facility])    [address]  [address]  [facility] [address]   [phone]            Jan 09, 2018 12:30 PM CST   (Arrive by 12:15 PM)   Return Visit with Lisandro Aguiar MD   [facility] ([facility])    [address]  2nd Floor  [facility] [address] [phone]            Jan 09, 2018  2:30 PM CST   Lymphedema Treatment with China Dent, OT   [facility] ([facility])    [address]  [address]  [facility] [address]   [phone]            Rex 10, 2018  1:30 PM CST   Lymphedema Treatment with Sarai Potter, OT   [facility] ([facility])    [address]  [address]  [facility] [address]   [phone]            Jan 11, 2018  1:30 PM CST   Lymphedema Treatment with Sarai Potter, OT   [facility] ([facility])    [address]  [address]  [facility] [address]   [phone]              Who to contact     If you have questions or need follow up information about today's clinic visit or your schedule please contact [facility] directly at [phone].  Normal or non-critical lab and imaging results will be communicated to you by MyChart, letter or phone within 4 business days after the clinic has received the results. If you do not hear from us within 7 days, please  contact the clinic through [employer] or phone. If you have a critical or abnormal lab result, we will notify you by phone as soon as possible.  Submit refill requests through [employer] or call your pharmacy and they will forward the refill request to us. Please allow 3 business days for your refill to be completed.          Additional Information About Your Visit        [employer] Information     [employer] gives you secure access to your electronic health record. If you see a primary care provider, you can also send messages to your care team and make appointments. If you have questions, please call your primary care clinic.  If you do not have a primary care provider, please call [phone] and they will assist you.        Care EveryWhere ID     This is your Care EveryWhere ID. This could be used by other organizations to access your [place] medical records  [id number]        Your Vitals Were     Pulse Temperature Respirations Pulse Oximetry BMI (Body Mass Index)       76 98.6  F (37  C) (Oral) 16 97% 31.95 kg/m2        Blood Pressure from Last 3 Encounters:   12/12/17 112/70   11/22/17 122/75   10/31/17 126/78    Weight from Last 3 Encounters:   12/12/17 79.2 kg (174 lb 11.2 oz)   11/22/17 78.8 kg (173 lb 12.8 oz)   10/31/17 79.2 kg (174 lb 9.6 oz)              Today, you had the following     No orders found for display       Primary Care Provider Fax #    Physician No Ref-Primary [phone]       No address on file        Equal Access to Services     NOE VAZQUEZ : Hadii kamran portilloo Soomaali, waaxda luqadaha, qaybta kaalmada adeegyada, alexa angulo . So [facility] [phone].    ATENCIÓN: Si habla español, tiene a lagunas disposición servicios gratuitos de asistencia lingüística. Llame al [phone].    We comply with applicable federal civil rights laws and [place] laws. We do not discriminate on the basis of race, color, national origin, age, disability, sex, sexual orientation, or  gender identity.            Thank you!     Thank you for choosing [facility]  for your care. Our goal is always to provide you with excellent care. Hearing back from our patients is one way we can continue to improve our services. Please take a few minutes to complete the written survey that you may receive in the mail after your visit with us. Thank you!             Your Updated Medication List - Protect others around you: Learn how to safely use, store and throw away your medicines at www.disposemymeds.org.          This list is accurate as of: 12/12/17 11:59 PM.  Always use your most recent med list.                   Brand Name Dispense Instructions for use Diagnosis    cephALEXin 500 MG capsule    KEFLEX    56 capsule    Take 1 capsule (500 mg) by mouth every 6 hours    Cellulitis of left upper extremity       order for DME     1 each    L UE class 2 compression sleeve and gauntlet Night garment Bandaging supplies    Lymphedema of left upper extremity       tamoxifen 20 MG tablet    NOLVADEX    90 tablet    Take 1 tablet (20 mg) by mouth daily    Cancer of central portion of left breast (H)       VITAMIN D (CHOLECALCIFEROL) PO      Take 1,000 Units by mouth daily

## 2017-12-12 NOTE — PATIENT INSTRUCTIONS
Contact Numbers    [facility] Main Line: [phone]  [facility] Triage:  [phone]    Call triage with chills and/or temperature greater than or equal to 100.5, uncontrolled nausea/vomiting, diarrhea, constipation, dizziness, shortness of breath, chest pain, bleeding, unexplained bruising, or any new/concerning symptoms, questions/concerns.     If you are having any concerning symptoms or wish to speak to a provider before your next infusion visit, please call your care coordinator or triage to notify them so we can adequately serve you.       After Hours: [phone]    If after hours, weekends, or holidays, call main hospital  and ask for Oncology doctor on call.           December 2017 Sunday Monday Tuesday Wednesday Thursday Friday Saturday                            1     2       3     4     5     6     7     8     9       10     11     12     ECH COMPLETE   10:45 AM   (75 min.)   [email or web address]   [facility] LAB DRAW    2:00 PM   (30 min.)    [facility] LAB DRAW   [facility] Lab Draw     P RETURN    2:25 PM   (90 min.)   Anne Lea PA   [facility] ONC INFUSION 60    3:30 PM   (90 min.)   UC ONCOLOGY INFUSION   [facility] 13     14     15     16       17     18     19     20     21     PET ONCOLOGY WHOLE BODY    9:00 AM   (45 min.)   [email or web address]   [facility] [place] PET CT 22     23       24     25     26     27     28     29     30       31 January 2018 Sunday Monday Tuesday Wednesday Thursday Friday Saturday        1     2     P [facility] LAB DRAW   12:00 PM   (15 min.)    [facility] DRAW   [facility]     UMP RETURN   12:15 PM   (30 min.)   Lisandro Aguiar MD   [facility] 60    1:30 PM   (60 min.)   UC ONCOLOGY INFUSION   [facility] 3     4     5     6       7     8     LYMPHEDEMA TREATMENT    1:30 PM    (75 min.)   Sarai Potter, OT   [facility] 9     UMP RETURN   12:15 PM   (30 min.)   Lisandro Aguiar MD   [facility]     LYMPHEDEMA TREATMENT    2:30 PM   (75 min.)   China Dent, OT   [facility] 10     LYMPHEDEMA TREATMENT    1:30 PM   (75 min.)   Sarai Potter, OT   [facility] 11     LYMPHEDEMA TREATMENT    1:30 PM   (75 min.)   Sarai Potter, OT   [facility] 12     LYMPHEDEMA TREATMENT   12:00 PM   (75 min.)   China Dent, OT   [facility] 13       14     15     LYMPHEDEMA TREATMENT    1:30 PM   (75 min.)   Sarai Potter, OT   [facility] 16     LYMPHEDEMA TREATMENT    1:30 PM   (75 min.)   China Dent, OT   [facility] 17     LYMPHEDEMA TREATMENT    1:30 PM   (75 min.)   Sarai Potter, OT   [facility] 18     LYMPHEDEMA TREATMENT    1:30 PM   (75 min.)   Sarai Potter, OT   [facility] 19     LYMPHEDEMA TREATMENT   12:00 PM   (75 min.)   China Dent, OT   [facility] 20       21     22     23     24     25     26     27       28     29     30     31                                 Lab Results:  Recent Results (from the past 12 hour(s))   CBC with platelets differential    Collection Time: 12/12/17  2:09 PM   Result Value Ref Range    WBC 7.0 4.0 - 11.0 10e9/L    RBC Count 3.76 (L) 3.8 - 5.2 10e12/L    Hemoglobin 11.5 (L) 11.7 - 15.7 g/dL    Hematocrit 36.4 35.0 - 47.0 %    MCV 97 78 - 100 fl    MCH 30.6 26.5 - 33.0 pg    MCHC 31.6 31.5 - 36.5 g/dL    RDW 13.2 10.0 - 15.0 %    Platelet Count 184 150 - 450 10e9/L    Diff Method Automated Method     % Neutrophils 42.3 %    % Lymphocytes 45.6 %    % Monocytes 7.0 %    % Eosinophils 4.6 %    % Basophils 0.4 %    % Immature Granulocytes 0.1 %    Nucleated RBCs 0 0 /100    Absolute  Neutrophil 2.9 1.6 - 8.3 10e9/L    Absolute Lymphocytes 3.2 0.8 - 5.3 10e9/L    Absolute Monocytes 0.5 0.0 - 1.3 10e9/L    Absolute Eosinophils 0.3 0.0 - 0.7 10e9/L    Absolute Basophils 0.0 0.0 - 0.2 10e9/L    Abs Immature Granulocytes 0.0 0 - 0.4 10e9/L    Absolute Nucleated RBC 0.0    Comprehensive metabolic panel    Collection Time: 12/12/17  2:09 PM   Result Value Ref Range    Sodium 141 133 - 144 mmol/L    Potassium 3.8 3.4 - 5.3 mmol/L    Chloride 107 94 - 109 mmol/L    Carbon Dioxide 29 20 - 32 mmol/L    Anion Gap 5 3 - 14 mmol/L    Glucose 126 (H) 70 - 99 mg/dL    Urea Nitrogen 12 7 - 30 mg/dL    Creatinine 0.71 0.52 - 1.04 mg/dL    GFR Estimate 84 >60 mL/min/1.7m2    GFR Estimate If Black >90 >60 mL/min/1.7m2    Calcium 8.0 (L) 8.5 - 10.1 mg/dL    Bilirubin Total 0.2 0.2 - 1.3 mg/dL    Albumin 3.2 (L) 3.4 - 5.0 g/dL    Protein Total 7.2 6.8 - 8.8 g/dL    Alkaline Phosphatase 38 (L) 40 - 150 U/L    ALT 19 0 - 50 U/L    AST 23 0 - 45 U/L   CA 27.29 Breast tumor marker    Collection Time: 12/12/17  2:09 PM   Result Value Ref Range    CA 27-29 <5 0 - 39 U/mL   CEA    Collection Time: 12/12/17  2:09 PM   Result Value Ref Range    CEA 0.6 0 - 2.5 ug/L

## 2017-12-21 ENCOUNTER — HOSPITAL ENCOUNTER (OUTPATIENT)
Dept: PET IMAGING | Facility: CLINIC | Age: 62
Discharge: HOME | End: 2017-12-21
Attending: INTERNAL MEDICINE | Admitting: INTERNAL MEDICINE
Payer: COMMERCIAL

## 2017-12-21 DIAGNOSIS — C50.912 MALIGNANT NEOPLASM OF LEFT FEMALE BREAST, UNSPECIFIED ESTROGEN RECEPTOR STATUS, UNSPECIFIED SITE OF BREAST (H): ICD-10-CM

## 2017-12-21 LAB — GLUCOSE BLDC GLUCOMTR-MCNC: 86 MG/DL (ref 70–99)

## 2017-12-21 PROCEDURE — A9552 F18 FDG: HCPCS | Performed by: INTERNAL MEDICINE

## 2017-12-21 PROCEDURE — T1013 SIGN LANG/ORAL INTERPRETER: HCPCS | Mod: U3

## 2017-12-21 PROCEDURE — 25000128 H RX IP 250 OP 636: Performed by: INTERNAL MEDICINE

## 2017-12-21 PROCEDURE — 82962 GLUCOSE BLOOD TEST: CPT

## 2017-12-21 PROCEDURE — 34300033 ZZH RX 343: Performed by: INTERNAL MEDICINE

## 2017-12-21 PROCEDURE — 78816 PET IMAGE W/CT FULL BODY: CPT | Mod: PS

## 2017-12-21 PROCEDURE — 71260 CT THORAX DX C+: CPT

## 2017-12-21 RX ORDER — IOPAMIDOL 755 MG/ML
20-135 INJECTION, SOLUTION INTRAVASCULAR ONCE
Status: COMPLETED | OUTPATIENT
Start: 2017-12-21 | End: 2017-12-21

## 2017-12-21 RX ORDER — HEPARIN SODIUM (PORCINE) LOCK FLUSH IV SOLN 100 UNIT/ML 100 UNIT/ML
500 SOLUTION INTRAVENOUS ONCE
Status: COMPLETED | OUTPATIENT
Start: 2017-12-21 | End: 2017-12-21

## 2017-12-21 RX ADMIN — SODIUM CHLORIDE, PRESERVATIVE FREE 500 UNITS: 5 INJECTION INTRAVENOUS at 10:31

## 2017-12-21 RX ADMIN — FLUDEOXYGLUCOSE F-18 11.16 MCI.: 500 INJECTION, SOLUTION INTRAVENOUS at 09:19

## 2017-12-21 RX ADMIN — IOPAMIDOL 107 ML: 755 INJECTION, SOLUTION INTRAVENOUS at 10:30

## 2018-01-03 ENCOUNTER — MYC MEDICAL ADVICE (OUTPATIENT)
Dept: FAMILY MEDICINE | Facility: CLINIC | Age: 63
End: 2018-01-03

## 2018-01-03 DIAGNOSIS — Z12.11 SCREENING FOR COLON CANCER: Primary | ICD-10-CM

## 2018-01-03 NOTE — TELEPHONE ENCOUNTER
Cyndie    See pt referral request    Referral cued    Advised    Anabela Hurst, RN   [facility]

## 2018-01-05 ENCOUNTER — ONCOLOGY VISIT (OUTPATIENT)
Dept: ONCOLOGY | Facility: CLINIC | Age: 63
End: 2018-01-05
Attending: INTERNAL MEDICINE
Payer: COMMERCIAL

## 2018-01-05 VITALS
BODY MASS INDEX: 32.68 KG/M2 | OXYGEN SATURATION: 96 % | WEIGHT: 177.6 LBS | HEIGHT: 62 IN | HEART RATE: 80 BPM | TEMPERATURE: 98.3 F | DIASTOLIC BLOOD PRESSURE: 66 MMHG | RESPIRATION RATE: 16 BRPM | SYSTOLIC BLOOD PRESSURE: 137 MMHG

## 2018-01-05 DIAGNOSIS — C50.912 MALIGNANT NEOPLASM OF LEFT FEMALE BREAST, UNSPECIFIED ESTROGEN RECEPTOR STATUS, UNSPECIFIED SITE OF BREAST (H): ICD-10-CM

## 2018-01-05 DIAGNOSIS — C50.912 MALIGNANT NEOPLASM OF LEFT FEMALE BREAST, UNSPECIFIED ESTROGEN RECEPTOR STATUS, UNSPECIFIED SITE OF BREAST (H): Primary | ICD-10-CM

## 2018-01-05 LAB
ALBUMIN SERPL-MCNC: 3.2 G/DL (ref 3.4–5)
ALP SERPL-CCNC: 34 U/L (ref 40–150)
ALT SERPL W P-5'-P-CCNC: 22 U/L (ref 0–50)
ANION GAP SERPL CALCULATED.3IONS-SCNC: 5 MMOL/L (ref 3–14)
AST SERPL W P-5'-P-CCNC: 20 U/L (ref 0–45)
BASOPHILS # BLD AUTO: 0 10E9/L (ref 0–0.2)
BASOPHILS NFR BLD AUTO: 0.5 %
BILIRUB SERPL-MCNC: 0.3 MG/DL (ref 0.2–1.3)
BUN SERPL-MCNC: 17 MG/DL (ref 7–30)
CALCIUM SERPL-MCNC: 8.5 MG/DL (ref 8.5–10.1)
CANCER AG27-29 SERPL-ACNC: 8 U/ML (ref 0–39)
CEA SERPL-MCNC: 0.8 UG/L (ref 0–2.5)
CHLORIDE SERPL-SCNC: 109 MMOL/L (ref 94–109)
CO2 SERPL-SCNC: 28 MMOL/L (ref 20–32)
CREAT SERPL-MCNC: 0.72 MG/DL (ref 0.52–1.04)
DIFFERENTIAL METHOD BLD: ABNORMAL
EOSINOPHIL # BLD AUTO: 0.3 10E9/L (ref 0–0.7)
EOSINOPHIL NFR BLD AUTO: 4.1 %
ERYTHROCYTE [DISTWIDTH] IN BLOOD BY AUTOMATED COUNT: 13.3 % (ref 10–15)
GFR SERPL CREATININE-BSD FRML MDRD: 83 ML/MIN/1.7M2
GLUCOSE SERPL-MCNC: 83 MG/DL (ref 70–99)
HCT VFR BLD AUTO: 38.5 % (ref 35–47)
HGB BLD-MCNC: 12.1 G/DL (ref 11.7–15.7)
IMM GRANULOCYTES # BLD: 0 10E9/L (ref 0–0.4)
IMM GRANULOCYTES NFR BLD: 0.2 %
LYMPHOCYTES # BLD AUTO: 2.6 10E9/L (ref 0.8–5.3)
LYMPHOCYTES NFR BLD AUTO: 40.9 %
MCH RBC QN AUTO: 30.1 PG (ref 26.5–33)
MCHC RBC AUTO-ENTMCNC: 31.4 G/DL (ref 31.5–36.5)
MCV RBC AUTO: 96 FL (ref 78–100)
MONOCYTES # BLD AUTO: 0.5 10E9/L (ref 0–1.3)
MONOCYTES NFR BLD AUTO: 7.1 %
NEUTROPHILS # BLD AUTO: 3 10E9/L (ref 1.6–8.3)
NEUTROPHILS NFR BLD AUTO: 47.2 %
NRBC # BLD AUTO: 0 10*3/UL
NRBC BLD AUTO-RTO: 0 /100
PLATELET # BLD AUTO: 191 10E9/L (ref 150–450)
POTASSIUM SERPL-SCNC: 4.3 MMOL/L (ref 3.4–5.3)
PROT SERPL-MCNC: 7.3 G/DL (ref 6.8–8.8)
RBC # BLD AUTO: 4.02 10E12/L (ref 3.8–5.2)
SODIUM SERPL-SCNC: 142 MMOL/L (ref 133–144)
WBC # BLD AUTO: 6.3 10E9/L (ref 4–11)

## 2018-01-05 PROCEDURE — T1013 SIGN LANG/ORAL INTERPRETER: HCPCS | Mod: U3

## 2018-01-05 PROCEDURE — T1013 SIGN LANG/ORAL INTERPRETER: HCPCS | Mod: U3,ZF

## 2018-01-05 PROCEDURE — 25000128 H RX IP 250 OP 636: Mod: ZF | Performed by: INTERNAL MEDICINE

## 2018-01-05 PROCEDURE — G0463 HOSPITAL OUTPT CLINIC VISIT: HCPCS | Mod: ZF

## 2018-01-05 PROCEDURE — 99215 OFFICE O/P EST HI 40 MIN: CPT | Mod: ZP | Performed by: INTERNAL MEDICINE

## 2018-01-05 PROCEDURE — 85025 COMPLETE CBC W/AUTO DIFF WBC: CPT | Performed by: INTERNAL MEDICINE

## 2018-01-05 PROCEDURE — 96413 CHEMO IV INFUSION 1 HR: CPT

## 2018-01-05 PROCEDURE — 82378 CARCINOEMBRYONIC ANTIGEN: CPT | Performed by: INTERNAL MEDICINE

## 2018-01-05 PROCEDURE — 80053 COMPREHEN METABOLIC PANEL: CPT | Performed by: INTERNAL MEDICINE

## 2018-01-05 PROCEDURE — 86300 IMMUNOASSAY TUMOR CA 15-3: CPT | Performed by: INTERNAL MEDICINE

## 2018-01-05 RX ORDER — MEPERIDINE HYDROCHLORIDE 25 MG/ML
25 INJECTION INTRAMUSCULAR; INTRAVENOUS; SUBCUTANEOUS EVERY 30 MIN PRN
Status: CANCELLED | OUTPATIENT
Start: 2018-01-05

## 2018-01-05 RX ORDER — EPINEPHRINE 0.3 MG/.3ML
0.3 INJECTION SUBCUTANEOUS EVERY 5 MIN PRN
Status: CANCELLED | OUTPATIENT
Start: 2018-01-05

## 2018-01-05 RX ORDER — HEPARIN SODIUM (PORCINE) LOCK FLUSH IV SOLN 100 UNIT/ML 100 UNIT/ML
5 SOLUTION INTRAVENOUS EVERY 8 HOURS
Status: CANCELLED | OUTPATIENT
Start: 2018-01-05

## 2018-01-05 RX ORDER — SODIUM CHLORIDE 9 MG/ML
1000 INJECTION, SOLUTION INTRAVENOUS CONTINUOUS PRN
Status: CANCELLED
Start: 2018-01-05

## 2018-01-05 RX ORDER — DIPHENHYDRAMINE HYDROCHLORIDE 50 MG/ML
50 INJECTION INTRAMUSCULAR; INTRAVENOUS
Status: CANCELLED
Start: 2018-01-05

## 2018-01-05 RX ORDER — ACETAMINOPHEN 325 MG/1
650 TABLET ORAL
Status: CANCELLED | OUTPATIENT
Start: 2018-01-05

## 2018-01-05 RX ORDER — ALBUTEROL SULFATE 0.83 MG/ML
2.5 SOLUTION RESPIRATORY (INHALATION)
Status: CANCELLED | OUTPATIENT
Start: 2018-01-05

## 2018-01-05 RX ORDER — METHYLPREDNISOLONE SODIUM SUCCINATE 125 MG/2ML
125 INJECTION, POWDER, LYOPHILIZED, FOR SOLUTION INTRAMUSCULAR; INTRAVENOUS
Status: CANCELLED
Start: 2018-01-05

## 2018-01-05 RX ORDER — ALBUTEROL SULFATE 90 UG/1
1-2 AEROSOL, METERED RESPIRATORY (INHALATION)
Status: CANCELLED
Start: 2018-01-05

## 2018-01-05 RX ORDER — LORAZEPAM 2 MG/ML
0.5 INJECTION INTRAMUSCULAR EVERY 4 HOURS PRN
Status: CANCELLED
Start: 2018-01-05

## 2018-01-05 RX ORDER — HEPARIN SODIUM (PORCINE) LOCK FLUSH IV SOLN 100 UNIT/ML 100 UNIT/ML
5 SOLUTION INTRAVENOUS EVERY 8 HOURS PRN
Status: DISCONTINUED | OUTPATIENT
Start: 2018-01-05 | End: 2018-01-11 | Stop reason: HOSPADM

## 2018-01-05 RX ORDER — EPINEPHRINE 1 MG/ML
0.3 INJECTION, SOLUTION, CONCENTRATE INTRAVENOUS EVERY 5 MIN PRN
Status: CANCELLED | OUTPATIENT
Start: 2018-01-05

## 2018-01-05 RX ORDER — DIPHENHYDRAMINE HCL 25 MG
50 CAPSULE ORAL ONCE
Status: CANCELLED
Start: 2018-01-05

## 2018-01-05 RX ORDER — HEPARIN SODIUM (PORCINE) LOCK FLUSH IV SOLN 100 UNIT/ML 100 UNIT/ML
5 SOLUTION INTRAVENOUS EVERY 8 HOURS
Status: DISCONTINUED | OUTPATIENT
Start: 2018-01-05 | End: 2018-01-05 | Stop reason: HOSPADM

## 2018-01-05 RX ADMIN — SODIUM CHLORIDE, PRESERVATIVE FREE 5 ML: 5 INJECTION INTRAVENOUS at 11:03

## 2018-01-05 RX ADMIN — SODIUM CHLORIDE, PRESERVATIVE FREE 5 ML: 5 INJECTION INTRAVENOUS at 12:28

## 2018-01-05 RX ADMIN — TRASTUZUMAB 450 MG: 150 INJECTION, POWDER, LYOPHILIZED, FOR SOLUTION INTRAVENOUS at 11:55

## 2018-01-05 ASSESSMENT — PAIN SCALES - GENERAL: PAINLEVEL: NO PAIN (0)

## 2018-01-05 NOTE — PROGRESS NOTES
Infusion Nursing Note:  Hoda Brush presents today for Cycle 6 Day 1 Herceptin.    Patient seen by provider today: Yes: Dr. Wood   present during visit today: Yes, Language: Brazilian.       Intravenous Access:  Implanted Port.    Treatment Conditions:  Lab Results   Component Value Date    HGB 12.1 01/05/2018     Lab Results   Component Value Date    WBC 6.3 01/05/2018      Lab Results   Component Value Date    ANEU 3.0 01/05/2018     Lab Results   Component Value Date     01/05/2018      Lab Results   Component Value Date     01/05/2018                   Lab Results   Component Value Date    POTASSIUM 4.3 01/05/2018           No results found for: MAG         Lab Results   Component Value Date    CR 0.72 01/05/2018                   Lab Results   Component Value Date    TAYLER 8.5 01/05/2018                Lab Results   Component Value Date    BILITOTAL 0.3 01/05/2018           Lab Results   Component Value Date    ALBUMIN 3.2 01/05/2018                    Lab Results   Component Value Date    ALT 22 01/05/2018           Lab Results   Component Value Date    AST 20 01/05/2018     Results reviewed, labs MET treatment parameters, ok to proceed with treatment.  ECHO/MUGA completed 12/12/17  EF 55-60%.          Post Infusion Assessment:  Patient tolerated infusion without incident.  Blood return noted pre and post infusion.  Site patent and intact, free from redness, edema or discomfort.  No evidence of extravasations.  Access discontinued per protocol.    Discharge Plan:   Patient declined prescription refills.  Copy of AVS reviewed with patient and/or family.  Patient will return 1/24/18 for labs, PA visit, Hercpetin for next appointment.  Patient discharged in stable condition accompanied by: .  Departure Mode: Ambulatory.  Face to Face time: 0.    Marry Diamond RN

## 2018-01-05 NOTE — PROGRESS NOTES
FOLLOW-UP VISIT NOTE    PATIENT NAME: Hoda Brush MRN # [id number]  DATE OF VISIT: Jan 5, 2018 YOB: 1955    REFERRING PROVIDER: Steffi Webb PA-C  [address] [facility] [address]  [place] [address]    CANCER TYPE: Metastatic breast cancer hormone positive/Her2 positive  STAGE: IV  ECOG PS: 0    ONCOLOGY HISTORY:  62-year-old female with metastatic estrogen receptor positive, HER-2 receptor positive breast cancer with bone metastases. [place] establish care with Dr. Aguiar. She resumed treatment with Herceptin and daily tamoxifen as well as Xgeva.    Last dose of Herceptin 12/12/2017      SUBJECTIVE     Patient is here for routine follow-up for next dose of Herceptin. Denies any active complaints. Denies fever/chills, chest pain, shortness of breath, M.D. edema, nausea/vomiting, cough or any other complaints      PAST MEDICAL HISTORY     Past Medical History:   Diagnosis Date     Breast cancer metastasized to bone (H)          CURRENT OUTPATIENT MEDICATIONS     Current Outpatient Prescriptions   Medication Sig Dispense Refill     tamoxifen (NOLVADEX) 20 MG tablet Take 1 tablet (20 mg) by mouth daily 90 tablet 3     VITAMIN D, CHOLECALCIFEROL, PO Take 1,000 Units by mouth daily       cephALEXin (KEFLEX) 500 MG capsule Take 1 capsule (500 mg) by mouth every 6 hours (Patient not taking: Reported on 10/31/2017) 56 capsule 0     order for DME L UE class 2 compression sleeve and gauntlet  Night garment  Bandaging supplies (Patient not taking: Reported on 12/12/2017) 1 each 1        ALLERGIES   No Known Allergies     REVIEW OF SYSTEMS   As above in the HPI, o/w complete 12-point ROS was negative.     PHYSICAL EXAM   B/P: 137/66, T: 98.3, P: 80, R: 16  SpO2 Readings from Last 4 Encounters:   01/05/18 96%   12/12/17 97%   11/22/17 98%   10/31/17 98%     Wt Readings from Last 3 Encounters:   01/05/18 80.6 kg (177 lb 9.6 oz)   12/12/17 79.2 kg  (174 lb 11.2 oz)   11/22/17 78.8 kg (173 lb 12.8 oz)     GEN: NAD  EYES:PERRLA  Mouth/ENT: Oropharynx is clear.  NECK: no cervical or supraclavicular lymphadenopathy  LUNGS: clear bilaterally  CV: regular, no murmurs, rubs, or gallops  ABDOMEN: soft, non-tender, non-distended, normal bowel sounds, no hepatosplenomegaly by percussion or palpation  EXT: warm, well perfused, no edema  NEURO: alert  SKIN: no rashes     LABORATORY AND IMAGING STUDIES     Recent Labs   Lab Test  01/05/18   1106  12/12/17   1409   NA  142  141   POTASSIUM  4.3  3.8   CHLORIDE  109  107   CO2  28  29   ANIONGAP  5  5   BUN  17  12   CR  0.72  0.71   GLC  83  126*   TAYLER  8.5  8.0*     Recent Labs   Lab Test  01/05/18   1106  12/12/17   1409  11/22/17   1004   WBC  6.3  7.0  6.8   HGB  12.1  11.5*  11.6*   PLT  191  184  211   MCV  96  97  95   NEUTROPHIL  47.2  42.3  47.9     Recent Labs   Lab Test  01/05/18   1106  12/12/17   1409  11/22/17   1004   BILITOTAL  0.3  0.2  0.3   ALKPHOS  34*  38*  38*   ALT  22  19  24   AST  20  23  19   ALBUMIN  3.2*  3.2*  3.2*     TSH   Date Value Ref Range Status   08/08/2017 1.30 0.40 - 4.00 mU/L Final   ]    All laboratory data reviewed    Results for orders placed or performed during the hospital encounter of 12/21/17   PET Oncology Whole Body    Narrative    Combined Report of:    PET and CT on  12/21/2017 1:32 PM :    1. PET of the neck, chest, abdomen, and pelvis.  2. PET CT Fusion for Attenuation Correction and Anatomical  Localization:    3. Diagnostic CT scan of the chest, abdomen, and pelvis with  intravenous contrast for interpretation.  4. 3D MIP and PET-CT fused images were processed on an independent  workstation and archived to PACS and reviewed by a radiologist.    Technique:    1. PET: The patient received 11.16 mCi of F-18-FDG; the serum glucose  was 86 prior to administration, body weight was 79.2 kg. Images were  evaluated in the axial, sagittal, and coronal planes as well as  the  rotational whole body MIP. Images were acquired from the Vertex to the  Feet.    UPTAKE WAS MEASURED AT 71 MINUTES.     2. CT: Volumetric acquisition for clinical interpretation of the  chest, abdomen, and pelvis acquired at 3 mm sections . The chest,  abdomen, and pelvis were evaluated at 5 mm sections in bone, soft  tissue, and lung windows.      The patient received 107 cc. Of Isovue 370 intravenously for the  examination.    --    3. 3D MIP and PET-CT fused images were processed on an independent  workstation and archived to PACS and reviewed by a radiologist.    INDICATION: ; Malignant neoplasm of left female breast, unspecified  estrogen receptor status, unspecified site of breast (H)    ADDITIONAL INFORMATION OBTAINED FROM EMR: none    COMPARISON: PET CT 8/21/2017    FINDINGS:     Right chest wall portacatheter tip is at the cavoatrial junction.    HEAD/NECK:  There is no  suspicious FDG uptake in the neck. There is symmetric  uptake in the palatine tonsils.    The paranasal sinuses are clear. The mastoid air cells are clear.     The mucosal pharyngeal space, the , prevertebral and carotid  spaces are within normal limits.     No masses, mass effect or pathologically enlarged lymph nodes are  evident. The thyroid gland is within normal limits   .    CHEST:  There is no suspicious FDG uptake in the chest.     Heart is not enlarged. No pericardial effusion. Stable paraesophageal  lymph node without increased FDG uptake, series 3 image 98. No  mediastinal, hilar or axillary lymphadenopathy. Central  tracheobronchial tree is clear. No focal consolidation, pneumothorax  or pleural effusion. Bibasilar fibroatelectasis. No suspicious  pulmonary nodules. Stable perihilar pleural thickening series 6 image  21.      ABDOMEN AND PELVIS:  There is no suspicious FDG uptake in the abdomen or pelvis.    Liver, gallbladder, spleen, pancreas, adrenals and kidneys are within  normal limits. No abnormally dilated  small or large bowel. Myomatous  uterus. No intra-abdominal pelvic lymphadenopathy or free fluid. Major  abdominal vasculature is patent.    LOWER EXTREMITIES:   No abnormal masses or hypermetabolic lesions.    BONES:   Multiple sclerotic lesions within the thoracolumbar spine and ribs.  These are stable compared with prior PET/CT. There do not demonstrate  increased FDG uptake. Expansile lytic and sclerotic lesion involving  the bilateral iliac bones appears unchanged. No new suspicious  lesions..  There is no abnormal FDG uptake in the skeleton.    SOFT TISSUES: Post surgical changes of left mastectomy. Soft tissues  are otherwise within normal limits.        Impression    IMPRESSION:   1. In this patient with a history of breast carcinoma status post left  mastectomy there is no evidence for recurrence or new metastasis.  Stable appearance of multiple sclerotic thoracolumbar and rib lesions  that are not metabolically active and presumably represent treated  metastases.  2. Stable appearance of expansile lytic and sclerotic lesions  involving the iliac bones. This could represent processes such as  fibrous dysplasia. Biopsy performed on 8/31/2017 did not demonstrate  malignancy. Given the bilaterality consider polyostotic fibrous  dysplasia. Bone scan could be used to confirm polyostotic or  monostotic fibrous dysplasia.    I have personally reviewed the examination and initial interpretation  and I agree with the findings.    MÓNICA BOSS MD         ASSESSMENT AND PLAN     62-year-old female with     #1 Metastatic breast cancer- estrogen separate positive HER-2 positive  Patient is currently on Herceptin q.3 weeks as well as daily tamoxifen  Most recent PET scan negative for evidence of recurrence or new metastases along with stable appearance of bone lesions.  Echocardiogram 12/12/17 showed EF 55-60% and normal LV function  Continue with current plan of care.   Proceed with next dose of Herceptin today    #2  Bone metastasis  Continue with calcium and vitamin D supplementation  Xgeva q.6 weeks. Next dose 01/23/18     Return to clinic in 3 weeks for Herceptin and xgeva    Chart documentation with Dragon Voice recognition Software. Although reviewed after completion, some words and grammatical errors may remain.  Robb Wood MD  Attending Physician   Hematology/Medical Oncology

## 2018-01-05 NOTE — MR AVS SNAPSHOT
After Visit Summary   1/5/2018    Hoda Brush    MRN: [id number]           Patient Information     Date Of Birth          1955        Visit Information        Provider Department      1/5/2018 11:30 AM Wesley Iraheta; Robb Wood MD [facility]        Today's Diagnoses     Malignant neoplasm of left female breast, unspecified estrogen receptor status, unspecified site of breast (H)           Follow-ups after your visit        Your next 10 appointments already scheduled     Jan 12, 2018 12:00 PM CST   Lymphedema Treatment with China Dent, OT   [facility] ([facility])    [address]  [address]  [place] [address]   [phone]            Rex 15, 2018  1:15 PM CST   Lymphedema Treatment with Sarai Potter, OT   [facility] ([facility])    [address]  [address]  [place] [address]   [phone]            Jan 16, 2018  1:30 PM CST   Lymphedema Treatment with China Dent, OT   [facility] ([facility])    [address]  [address]  [place] [address]   [phone]            Jan 17, 2018  1:30 PM CST   Lymphedema Treatment with Sarai Potter, OT   [facility] ([facility])    [address]  [address]  [place] [address]   [phone]            Jan 18, 2018  1:30 PM CST   Lymphedema Treatment with Sarai Potter, OT   [facility] ([facility])    [address]  [address]  [place] [address]   [phone]            Jan 19, 2018 12:00 PM CST   Lymphedema Treatment with China Dent, OT   [facility] ([facility])    [address]  [address]  [place] [address]   [phone]            Jan 24, 2018 11:30 AM CST   [facility] Lab Draw with  [facility] LAB DRAW   [facility] Draw ([facility])    [address] [place] [address]  [address]  [facility] [address] [phone]            Jan 24, 2018 12:10 PM CST   (Arrive by 11:55 AM)   Return Visit with Anne Lea PA-C   [facility] ([facility])    [address] [place] [address] [address]  [address]  [facility] [address] [phone]            Jan 24, 2018  1:00 PM CST   Infusion 60 with UC ONCOLOGY INFUSION, UC 25 ATC   [facility] ([facility])    [address] [place] [address] Se  [address]  [facility] [address] [phone]              Who to contact     If you have questions or need follow up information about today's clinic visit or your schedule please contact [facility] directly at [phone].  Normal or non-critical lab and imaging results will be communicated to you by MyChart, letter or phone within 4 business days after the clinic has received the results. If you do not hear from us within 7 days, please contact the clinic through TSO3hart or phone. If you have a critical or abnormal lab result, we will notify you by phone as soon as possible.  Submit refill requests through [employer] or call your pharmacy and they will forward the refill request to us. Please allow 3 business days for your refill to be completed.          Additional Information About Your Visit        TSO3hart Information     [employer] gives you secure access to your electronic health record. If you see a primary care provider, you can also send messages to your care team and make appointments. If you have questions, please call your primary care clinic.  If you do not have a primary care provider, please call [phone] and they will assist you.        Care EveryWhere ID     This is your Care EveryWhere ID. This could be used by other organizations to access your [facility] medical records  [id number]        Your Vitals Were     Pulse Temperature Respirations Height Pulse Oximetry BMI (Body Mass Index)    80 98.3  " F (36.8  C) (Oral) 16 1.575 m (5' 2.01\") 96% 32.48 kg/m2       Blood Pressure from Last 3 Encounters:   01/05/18 137/66   12/12/17 112/70   11/22/17 122/75    Weight from Last 3 Encounters:   01/05/18 80.6 kg (177 lb 9.6 oz)   12/12/17 79.2 kg (174 lb 11.2 oz)   11/22/17 78.8 kg (173 lb 12.8 oz)              Today, you had the following     No orders found for display       Primary Care Provider Fax #    Physician No Ref-Primary [phone]       No address on file        Equal Access to Services     NOE VAZQUEZ : Luz Marina Ramsey, ro jefferson, jamal wilkins, alexa angulo . So [facility] [phone].    ATENCIÓN: Si habla español, tiene a lagunas disposición servicios gratuitos de asistencia lingüística. Llame al [phone].    We comply with applicable federal civil rights laws and [place] laws. We do not discriminate on the basis of race, color, national origin, age, disability, sex, sexual orientation, or gender identity.            Thank you!     Thank you for choosing [facility]  for your care. Our goal is always to provide you with excellent care. Hearing back from our patients is one way we can continue to improve our services. Please take a few minutes to complete the written survey that you may receive in the mail after your visit with us. Thank you!             Your Updated Medication List - Protect others around you: Learn how to safely use, store and throw away your medicines at www.disposemymeds.org.          This list is accurate as of: 1/5/18 11:59 PM.  Always use your most recent med list.                   Brand Name Dispense Instructions for use Diagnosis    cephALEXin 500 MG capsule    KEFLEX    56 capsule    Take 1 capsule (500 mg) by mouth every 6 hours    Cellulitis of left upper extremity       order for DME     1 each    L UE class 2 compression sleeve and gauntlet Night garment Bandaging supplies    Lymphedema of left " upper extremity       tamoxifen 20 MG tablet    NOLVADEX    90 tablet    Take 1 tablet (20 mg) by mouth daily    Cancer of central portion of left breast (H)       VITAMIN D (CHOLECALCIFEROL) PO      Take 1,000 Units by mouth daily

## 2018-01-05 NOTE — MR AVS SNAPSHOT
After Visit Summary   1/5/2018    Hoda Brush    MRN: [id number]           Patient Information     Date Of Birth          1955        Visit Information        Provider Department      1/5/2018 2:00 PM Wesley Iraheta;  24 ATC;  ONCOLOGY INFUSION  Services Department        Today's Diagnoses     Malignant neoplasm of left female breast, unspecified estrogen receptor status, unspecified site of breast (H)    -  1      [facility]    Cl[facility] Center Main Line: [phone]    Call triage nurse with chills and/or temperature greater than or equal to 100.4, uncontrolled nausea/vomiting, diarrhea, constipation, dizziness, shortness of breath, chest pain, bleeding, unexplained bruising, or any new/concerning symptoms, questions/concerns.   If you are having any concerning symptoms or wish to speak to a provider before your next infusion visit, please call your care coordinator or triage to notify them so we can adequately serve you.   Triage Nurse Line: [phone]    If after hours, weekends, or holidays, call main hospital  and ask for Oncology doctor on call @ [phone]               January 2018 Sunday Monday Tuesday Wednesday Thursday Friday Saturday        1     2     3     4     5     [facility] LAB DRAW   11:00 AM   (30 min.)    [facility]   [facility] Lab Draw     [facility] RETURN   11:30 AM   (90 min.)   Robb Wood MD   [facility] ONC INFUSION 60    2:00 PM   (60 min.)    ONCOLOGY INFUSION   [facility] 6       7     8     LYMPHEDEMA TREATMENT    1:15 PM   (90 min.)   Sarai Potter, OT   [facility] 9     LYMPHEDEMA TREATMENT    2:15 PM   (90 min.)   China Dent, JACKIE [facility] 10     LYMPHEDEMA TREATMENT    1:15 PM   (90 min.)   Sarai Potter, JACKIE [facility] 11     LYMPHEDEMA TREATMENT    1:30 PM    (75 min.)   Sarai Potter, OT   [facility] [facility] 12     LYMPHEDEMA TREATMENT   12:00 PM   (75 min.)   China Dent, OT   [place] [facility] 13       14     15     LYMPHEDEMA TREATMENT    1:30 PM   (75 min.)   Sarai Potter, OT   [place] [facility] 16     LYMPHEDEMA TREATMENT    1:30 PM   (75 min.)   China Dent, OT   [place] [facility] 17     LYMPHEDEMA TREATMENT    1:30 PM   (75 min.)   Sarai Potter, OT   [place] [facility] 18     LYMPHEDEMA TREATMENT    1:30 PM   (75 min.)   Sarai Potter, OT   [facility] [facility] 19     LYMPHEDEMA TREATMENT   12:00 PM   (75 min.)   China Dent, OT   [place] [facility] 20       21     22     23     24     [facility]   11:30 AM   (15 min.)    [facility] DRAW   [facility] Draw     [facility] RETURN   11:55 AM   (50 min.)   Anne Lea PA   [facility]     [facility] ONC INFUSION 60    1:00 PM   (60 min.)    ONCOLOGY INFUSION   [facility] 25     26     27       28     29     30     31 February 2018 Sunday Monday Tuesday Wednesday Thursday Friday Saturday                       1     2     3       4     5     6     7     8     9     10       11     12     13     [facility] D[facility]    7:30 AM   (15 min.)    [facility] DRAW   [facility] Draw     [facility] RETURN    7:45 AM   (30 min.)   Lisandro Aguiar MD   [facility]     [facility] ONC INFUSION 60    9:00 AM   (60 min.)    ONCOLOGY INFUSION   [facility] 14     15     16     17       18  Happy Birthday!     19     20     21     22     23     24       25     26     27     28                                 Lab Results:  Recent Results (from the past 12 hour(s))   CBC with platelets differential    Collection Time: 01/05/18 11:06 AM   Result Value Ref Range    WBC  6.3 4.0 - 11.0 10e9/L    RBC Count 4.02 3.8 - 5.2 10e12/L    Hemoglobin 12.1 11.7 - 15.7 g/dL    Hematocrit 38.5 35.0 - 47.0 %    MCV 96 78 - 100 fl    MCH 30.1 26.5 - 33.0 pg    MCHC 31.4 (L) 31.5 - 36.5 g/dL    RDW 13.3 10.0 - 15.0 %    Platelet Count 191 150 - 450 10e9/L    Diff Method Automated Method     % Neutrophils 47.2 %    % Lymphocytes 40.9 %    % Monocytes 7.1 %    % Eosinophils 4.1 %    % Basophils 0.5 %    % Immature Granulocytes 0.2 %    Nucleated RBCs 0 0 /100    Absolute Neutrophil 3.0 1.6 - 8.3 10e9/L    Absolute Lymphocytes 2.6 0.8 - 5.3 10e9/L    Absolute Monocytes 0.5 0.0 - 1.3 10e9/L    Absolute Eosinophils 0.3 0.0 - 0.7 10e9/L    Absolute Basophils 0.0 0.0 - 0.2 10e9/L    Abs Immature Granulocytes 0.0 0 - 0.4 10e9/L    Absolute Nucleated RBC 0.0    Comprehensive metabolic panel    Collection Time: 01/05/18 11:06 AM   Result Value Ref Range    Sodium 142 133 - 144 mmol/L    Potassium 4.3 3.4 - 5.3 mmol/L    Chloride 109 94 - 109 mmol/L    Carbon Dioxide 28 20 - 32 mmol/L    Anion Gap 5 3 - 14 mmol/L    Glucose 83 70 - 99 mg/dL    Urea Nitrogen 17 7 - 30 mg/dL    Creatinine 0.72 0.52 - 1.04 mg/dL    GFR Estimate 83 >60 mL/min/1.7m2    GFR Estimate If Black >90 >60 mL/min/1.7m2    Calcium 8.5 8.5 - 10.1 mg/dL    Bilirubin Total 0.3 0.2 - 1.3 mg/dL    Albumin 3.2 (L) 3.4 - 5.0 g/dL    Protein Total 7.3 6.8 - 8.8 g/dL    Alkaline Phosphatase 34 (L) 40 - 150 U/L    ALT 22 0 - 50 U/L    AST 20 0 - 45 U/L             Follow-ups after your visit        Your next 10 appointments already scheduled     Jan 05, 2018  2:00 PM CST   Infusion 60 with UC ONCOLOGY INFUSION, [facility] ([facility])    [address]  [address]  [facility] [address] [phone]            Jan 08, 2018  1:15 PM CST   Lymphedema Treatment with Sarai Potter OT   [facility] ([facility])    [address]  [address]  [facility]  [address]   [phone]            Jan 09, 2018  2:15 PM CST   Lymphedema Treatment with China DALJIT Dent, OT   [facility] ([facility])    [address]  [address]  [place] [address]   [phone]            Rex 10, 2018  1:15 PM CST   Lymphedema Treatment with Sarai MANISH Potter, OT   [facility] ([facility])    [address]  [address]  [place] [address]   [phone]            Jan 11, 2018  1:30 PM CST   Lymphedema Treatment with Saraimichael Potter, OT   [facility] ([facility])    [address]  [address]  [place] [address]   [phone]            Jan 12, 2018 12:00 PM CST   Lymphedema Treatment with China DALJIT Piersonon, OT   [facility] ([facility])    [address]  [address]  [place] [address]   [phone]            Rex 15, 2018  1:30 PM CST   Lymphedema Treatment with Sarai Potter, OT   [facility] ([facility])    [address]  [address]  [place] [address]   [phone]            Jan 16, 2018  1:30 PM CST   Lymphedema Treatment with China DALJIT Piersonon, OT   [facility] ([facility])    [address]  [address]  [place] [address]   [phone]            Jan 17, 2018  1:30 PM CST   Lymphedema Treatment with Sarai Potter, OT   [facility] ([facility])    [address]  [address]  [place] [address]   [phone]              Who to contact     If you have questions or need follow up information about today's clinic visit or your schedule please contact [facility] CL[facility] directly at [phone].  Normal or non-critical lab and imaging results will be communicated to you by MyChart, letter or phone within 4 business days after the clinic has received the results. If you do not hear from us within 7 days, please contact the clinic through MyChart or phone. If  you have a critical or abnormal lab result, we will notify you by phone as soon as possible.  Submit refill requests through [employer] or call your pharmacy and they will forward the refill request to us. Please allow 3 business days for your refill to be completed.          Additional Information About Your Visit        [employer] Information     [employer] gives you secure access to your electronic health record. If you see a primary care provider, you can also send messages to your care team and make appointments. If you have questions, please call your primary care clinic.  If you do not have a primary care provider, please call [phone] and they will assist you.        Care EveryWhere ID     This is your Care EveryWhere ID. This could be used by other organizations to access your [place] medical records  [id number]         Blood Pressure from Last 3 Encounters:   01/05/18 137/66   12/12/17 112/70   11/22/17 122/75    Weight from Last 3 Encounters:   01/05/18 80.6 kg (177 lb 9.6 oz)   12/12/17 79.2 kg (174 lb 11.2 oz)   11/22/17 78.8 kg (173 lb 12.8 oz)              We Performed the Following     CA 27.29 Breast tumor marker     CBC with platelets differential     CEA     Comprehensive metabolic panel        Primary Care Provider Fax #    Physician No Ref-Primary [phone]       No address on file        Equal Access to Services     NOE VAZQUEZ : Hadii kamran portilloo Sosusanna, waaxda luqadaha, qaybta kaalmada adepretty, alexa angulo . So [facility] [phone].    ATENCIÓN: Si habla español, tiene a lagunas disposición servicios gratuitos de asistencia lingüística. Llame al [phone].    We comply with applicable federal civil rights laws and [place] laws. We do not discriminate on the basis of race, color, national origin, age, disability, sex, sexual orientation, or gender identity.            Thank you!     Thank you for choosing [facility]ER [facility]  for your care. Our  goal is always to provide you with excellent care. Hearing back from our patients is one way we can continue to improve our services. Please take a few minutes to complete the written survey that you may receive in the mail after your visit with us. Thank you!             Your Updated Medication List - Protect others around you: Learn how to safely use, store and throw away your medicines at www.disposemymeds.org.          This list is accurate as of: 1/5/18 12:31 PM.  Always use your most recent med list.                   Brand Name Dispense Instructions for use Diagnosis    cephALEXin 500 MG capsule    KEFLEX    56 capsule    Take 1 capsule (500 mg) by mouth every 6 hours    Cellulitis of left upper extremity       order for DME     1 each    L UE class 2 compression sleeve and gauntlet Night garment Bandaging supplies    Lymphedema of left upper extremity       tamoxifen 20 MG tablet    NOLVADEX    90 tablet    Take 1 tablet (20 mg) by mouth daily    Cancer of central portion of left breast (H)       VITAMIN D (CHOLECALCIFEROL) PO      Take 1,000 Units by mouth daily

## 2018-01-05 NOTE — PATIENT INSTRUCTIONS
[facility] Main Line: [phone]    Call triage nurse with chills and/or temperature greater than or equal to 100.4, uncontrolled nausea/vomiting, diarrhea, constipation, dizziness, shortness of breath, chest pain, bleeding, unexplained bruising, or any new/concerning symptoms, questions/concerns.   If you are having any concerning symptoms or wish to speak to a provider before your next infusion visit, please call your care coordinator or triage to notify them so we can adequately serve you.   Triage Nurse Line: [phone]    If after hours, weekends, or holidays, call main hospital  and ask for Oncology doctor on call @ [phone]               January 2018 Sunday Monday Tuesday Wednesday Thursday Friday Saturday        1     2     3     4     5     [facility] DRAW   11:00 AM   (30 min.)    [facility] DRAW   [facility] Lab Draw     [facility] RETURN   11:30 AM   (90 min.)   Robb Wood MD   [facility] 60    2:00 PM   (60 min.)    ONCOLOGY INFUSION   [facility] 6       7     8     LYMPHEDEMA TREATMENT    1:15 PM   (90 min.)   Sarai Potter OT   [facility] 9     LYMPHEDEMA TREATMENT    2:15 PM   (90 min.)   China Dent, OT   [facility] 10     LYMPHEDEMA TREATMENT    1:15 PM   (90 min.)   Sarai Potter OT   [facility] 11     LYMPHEDEMA TREATMENT    1:30 PM   (75 min.)   Sarai Potter OT   [facility] 12     LYMPHEDEMA TREATMENT   12:00 PM   (75 min.)   China Dent, OT   [facility] 13       14     15     LYMPHEDEMA TREATMENT    1:30 PM   (75 min.)   Sarai Potter OT   [facility] 16     LYMPHEDEMA TREATMENT    1:30 PM   (75 min.)   China Dent OT   [facility] 17     LYMPHEDEMA TREATMENT    1:30 PM   (75 min.)   Sarai Potter  OT   [facility]ymp[facility] 18     LYMPHEDEMA TREATMENT    1:30 PM   (75 min.)   Sarai Potter, OT   [facility] 19     LYMPHEDEMA TREATMENT   12:00 PM   (75 min.)   China Dent, OT   [facility] 20       21     22     23     24     [facility] DRAW   11:30 AM   (15 min.)   [facility]   [facility] Draw     [facility] RETURN   11:55 AM   (50 min.)   Anne Lea PA   [facility]     [facility] ONC INFUSION 60    1:00 PM   (60 min.)   [facility] ONCOLOGY INFUSION   [facility] 25     26     27       28     29     30     31 February 2018 Sunday Monday Tuesday Wednesday Thursday Friday Saturday                       1     2     3       4     5     6     7     8     9     10       11     12     13     [facility] DRAW    7:30 AM   (15 min.)    [facility] LAB DRAW   [facility] Lab Draw     [facility] RETURN    7:45 AM   (30 min.)   Lisandro Aguiar MD   [facility]     [facility] ONC INFUSION 60    9:00 AM   (60 min.)   [facility] ONCOLOGY INFUSION   [facility] 14     15     16     17       18  Happy Birthday!     19     20     21     22     23     24       25     26     27     28                                 Lab Results:  Recent Results (from the past 12 hour(s))   CBC with platelets differential    Collection Time: 01/05/18 11:06 AM   Result Value Ref Range    WBC 6.3 4.0 - 11.0 10e9/L    RBC Count 4.02 3.8 - 5.2 10e12/L    Hemoglobin 12.1 11.7 - 15.7 g/dL    Hematocrit 38.5 35.0 - 47.0 %    MCV 96 78 - 100 fl    MCH 30.1 26.5 - 33.0 pg    MCHC 31.4 (L) 31.5 - 36.5 g/dL    RDW 13.3 10.0 - 15.0 %    Platelet Count 191 150 - 450 10e9/L    Diff Method Automated Method     % Neutrophils 47.2 %    % Lymphocytes 40.9 %    % Monocytes 7.1 %    % Eosinophils 4.1 %    % Basophils 0.5 %    % Immature Granulocytes 0.2 %    Nucleated RBCs 0 0 /100     Absolute Neutrophil 3.0 1.6 - 8.3 10e9/L    Absolute Lymphocytes 2.6 0.8 - 5.3 10e9/L    Absolute Monocytes 0.5 0.0 - 1.3 10e9/L    Absolute Eosinophils 0.3 0.0 - 0.7 10e9/L    Absolute Basophils 0.0 0.0 - 0.2 10e9/L    Abs Immature Granulocytes 0.0 0 - 0.4 10e9/L    Absolute Nucleated RBC 0.0    Comprehensive metabolic panel    Collection Time: 01/05/18 11:06 AM   Result Value Ref Range    Sodium 142 133 - 144 mmol/L    Potassium 4.3 3.4 - 5.3 mmol/L    Chloride 109 94 - 109 mmol/L    Carbon Dioxide 28 20 - 32 mmol/L    Anion Gap 5 3 - 14 mmol/L    Glucose 83 70 - 99 mg/dL    Urea Nitrogen 17 7 - 30 mg/dL    Creatinine 0.72 0.52 - 1.04 mg/dL    GFR Estimate 83 >60 mL/min/1.7m2    GFR Estimate If Black >90 >60 mL/min/1.7m2    Calcium 8.5 8.5 - 10.1 mg/dL    Bilirubin Total 0.3 0.2 - 1.3 mg/dL    Albumin 3.2 (L) 3.4 - 5.0 g/dL    Protein Total 7.3 6.8 - 8.8 g/dL    Alkaline Phosphatase 34 (L) 40 - 150 U/L    ALT 22 0 - 50 U/L    AST 20 0 - 45 U/L

## 2018-01-05 NOTE — NURSING NOTE
Chief Complaint   Patient presents with     Port Draw     Labs drawn via port by RN, line flushed and hep locked, VS taken     Mirat Henderson RN

## 2018-01-05 NOTE — NURSING NOTE
"Oncology Rooming Note    January 5, 2018 11:14 AM   Hoda Brush is a 62 year old female who presents for:    Chief Complaint   Patient presents with     Port Draw     Labs drawn via port by RN, line flushed and hep locked, VS taken     Oncology Clinic Visit     Hx of Breast Ca, F/U.     Initial Vitals: /66 (BP Location: Right arm, Patient Position: Sitting, Cuff Size: Adult Regular)  Pulse 80  Temp 98.3  F (36.8  C) (Oral)  Resp 16  Ht 1.575 m (5' 2.01\")  Wt 80.6 kg (177 lb 9.6 oz)  SpO2 96%  BMI 32.48 kg/m2 Estimated body mass index is 32.48 kg/(m^2) as calculated from the following:    Height as of this encounter: 1.575 m (5' 2.01\").    Weight as of this encounter: 80.6 kg (177 lb 9.6 oz). Body surface area is 1.88 meters squared.  No Pain (0) Comment: Data Unavailable   No LMP recorded. Patient is postmenopausal.  Allergies reviewed: Yes  Medications reviewed: Yes    Medications: Medication refills not needed today.  Pharmacy name entered into [employer]:    [id number] [place] [facility] - [place], [place] - [address] [id number] - [place], [place] [address] [facility] [address] [place] [address] DRUG STORE [id number] - [place], [place] - [address] AT  [address] [place] [address] [facility]    Clinical concerns: None Dr Wood was NOT notified.    7 minutes for nursing intake (face to face time)     Karis Amezcua LPN              "

## 2018-01-05 NOTE — LETTER
1/5/2018       RE: Hoda Brush  [address]  [facility] [address]     Dear Colleague,    Thank you for referring your patient, Hoda Brush, to the [facility]. Please see a copy of my visit note below.      FOLLOW-UP VISIT NOTE    PATIENT NAME: Hoda Brush MRN # [id number]  DATE OF VISIT: Jan 5, 2018 YOB: 1955    REFERRING PROVIDER: Steffi Webb PA-C  [address] [place] [address] [address]  [place] [address]    CANCER TYPE: Metastatic breast cancer hormone positive/Her2 positive  STAGE: IV  ECOG PS: 0    ONCOLOGY HISTORY:  62-year-old female with metastatic estrogen receptor positive, HER-2 receptor positive breast cancer with bone metastases. She recently came [place] the [place] establish care with Dr. Aguiar. She resumed treatment with Herceptin and daily tamoxifen as well as Xgeva.    Last dose of Herceptin 12/12/2017      SUBJECTIVE     Patient is here for routine follow-up for next dose of Herceptin. Denies any active complaints. Denies fever/chills, chest pain, shortness of breath, M.D. edema, nausea/vomiting, cough or any other complaints      PAST MEDICAL HISTORY     Past Medical History:   Diagnosis Date     Breast cancer metastasized to bone (H)          CURRENT OUTPATIENT MEDICATIONS     Current Outpatient Prescriptions   Medication Sig Dispense Refill     tamoxifen (NOLVADEX) 20 MG tablet Take 1 tablet (20 mg) by mouth daily 90 tablet 3     VITAMIN D, CHOLECALCIFEROL, PO Take 1,000 Units by mouth daily       cephALEXin (KEFLEX) 500 MG capsule Take 1 capsule (500 mg) by mouth every 6 hours (Patient not taking: Reported on 10/31/2017) 56 capsule 0     order for DME L UE class 2 compression sleeve and gauntlet  Night garment  Bandaging supplies (Patient not taking: Reported on 12/12/2017) 1 each 1        ALLERGIES   No Known Allergies     REVIEW OF SYSTEMS   As above in the HPI, o/w complete 12-point ROS was  negative.     PHYSICAL EXAM   B/P: 137/66, T: 98.3, P: 80, R: 16  SpO2 Readings from Last 4 Encounters:   01/05/18 96%   12/12/17 97%   11/22/17 98%   10/31/17 98%     Wt Readings from Last 3 Encounters:   01/05/18 80.6 kg (177 lb 9.6 oz)   12/12/17 79.2 kg (174 lb 11.2 oz)   11/22/17 78.8 kg (173 lb 12.8 oz)     GEN: NAD  EYES:PERRLA  Mouth/ENT: Oropharynx is clear.  NECK: no cervical or supraclavicular lymphadenopathy  LUNGS: clear bilaterally  CV: regular, no murmurs, rubs, or gallops  ABDOMEN: soft, non-tender, non-distended, normal bowel sounds, no hepatosplenomegaly by percussion or palpation  EXT: warm, well perfused, no edema  NEURO: alert  SKIN: no rashes     LABORATORY AND IMAGING STUDIES     Recent Labs   Lab Test  01/05/18   1106  12/12/17   1409   NA  142  141   POTASSIUM  4.3  3.8   CHLORIDE  109  107   CO2  28  29   ANIONGAP  5  5   BUN  17  12   CR  0.72  0.71   GLC  83  126*   TAYLER  8.5  8.0*     Recent Labs   Lab Test  01/05/18   1106  12/12/17   1409  11/22/17   1004   WBC  6.3  7.0  6.8   HGB  12.1  11.5*  11.6*   PLT  191  184  211   MCV  96  97  95   NEUTROPHIL  47.2  42.3  47.9     Recent Labs   Lab Test  01/05/18   1106  12/12/17   1409  11/22/17   1004   BILITOTAL  0.3  0.2  0.3   ALKPHOS  34*  38*  38*   ALT  22  19  24   AST  20  23  19   ALBUMIN  3.2*  3.2*  3.2*     TSH   Date Value Ref Range Status   08/08/2017 1.30 0.40 - 4.00 mU/L Final   ]    All laboratory data reviewed    Results for orders placed or performed during the hospital encounter of 12/21/17   PET Oncology Whole Body    Narrative    Combined Report of:    PET and CT on  12/21/2017 1:32 PM :    1. PET of the neck, chest, abdomen, and pelvis.  2. PET CT Fusion for Attenuation Correction and Anatomical  Localization:    3. Diagnostic CT scan of the chest, abdomen, and pelvis with  intravenous contrast for interpretation.  4. 3D MIP and PET-CT fused images were processed on an independent  workstation and archived to PACS and  reviewed by a radiologist.    Technique:    1. PET: The patient received 11.16 mCi of F-18-FDG; the serum glucose  was 86 prior to administration, body weight was 79.2 kg. Images were  evaluated in the axial, sagittal, and coronal planes as well as the  rotational whole body MIP. Images were acquired from the Vertex to the  Feet.    UPTAKE WAS MEASURED AT 71 MINUTES.     2. CT: Volumetric acquisition for clinical interpretation of the  chest, abdomen, and pelvis acquired at 3 mm sections . The chest,  abdomen, and pelvis were evaluated at 5 mm sections in bone, soft  tissue, and lung windows.      The patient received 107 cc. Of Isovue 370 intravenously for the  examination.    --    3. 3D MIP and PET-CT fused images were processed on an independent  workstation and archived to PACS and reviewed by a radiologist.    INDICATION: ; Malignant neoplasm of left female breast, unspecified  estrogen receptor status, unspecified site of breast (H)    ADDITIONAL INFORMATION OBTAINED FROM EMR: none    COMPARISON: PET CT 8/21/2017    FINDINGS:     Right chest wall portacatheter tip is at the cavoatrial junction.    HEAD/NECK:  There is no  suspicious FDG uptake in the neck. There is symmetric  uptake in the palatine tonsils.    The paranasal sinuses are clear. The mastoid air cells are clear.     The mucosal pharyngeal space, the , prevertebral and carotid  spaces are within normal limits.     No masses, mass effect or pathologically enlarged lymph nodes are  evident. The thyroid gland is within normal limits   .    CHEST:  There is no suspicious FDG uptake in the chest.     Heart is not enlarged. No pericardial effusion. Stable paraesophageal  lymph node without increased FDG uptake, series 3 image 98. No  mediastinal, hilar or axillary lymphadenopathy. Central  tracheobronchial tree is clear. No focal consolidation, pneumothorax  or pleural effusion. Bibasilar fibroatelectasis. No suspicious  pulmonary nodules.  Stable perihilar pleural thickening series 6 image  21.      ABDOMEN AND PELVIS:  There is no suspicious FDG uptake in the abdomen or pelvis.    Liver, gallbladder, spleen, pancreas, adrenals and kidneys are within  normal limits. No abnormally dilated small or large bowel. Myomatous  uterus. No intra-abdominal pelvic lymphadenopathy or free fluid. Major  abdominal vasculature is patent.    LOWER EXTREMITIES:   No abnormal masses or hypermetabolic lesions.    BONES:   Multiple sclerotic lesions within the thoracolumbar spine and ribs.  These are stable compared with prior PET/CT. There do not demonstrate  increased FDG uptake. Expansile lytic and sclerotic lesion involving  the bilateral iliac bones appears unchanged. No new suspicious  lesions..  There is no abnormal FDG uptake in the skeleton.    SOFT TISSUES: Post surgical changes of left mastectomy. Soft tissues  are otherwise within normal limits.        Impression    IMPRESSION:   1. In this patient with a history of breast carcinoma status post left  mastectomy there is no evidence for recurrence or new metastasis.  Stable appearance of multiple sclerotic thoracolumbar and rib lesions  that are not metabolically active and presumably represent treated  metastases.  2. Stable appearance of expansile lytic and sclerotic lesions  involving the iliac bones. This could represent processes such as  fibrous dysplasia. Biopsy performed on 8/31/2017 did not demonstrate  malignancy. Given the bilaterality consider polyostotic fibrous  dysplasia. Bone scan could be used to confirm polyostotic or  monostotic fibrous dysplasia.    I have personally reviewed the examination and initial interpretation  and I agree with the findings.    MÓNICA BOSS MD         ASSESSMENT AND PLAN     62-year-old female with     #1 Metastatic breast cancer- estrogen separate positive HER-2 positive  Patient is currently on Herceptin q.3 weeks as well as daily tamoxifen  Most recent PET  scan negative for evidence of recurrence or new metastases along with stable appearance of bone lesions.  Echocardiogram 12/12/17 showed EF 55-60% and normal LV function  Continue with current plan of care.   Proceed with next dose of Herceptin today    #2 Bone metastasis  Continue with calcium and vitamin D supplementation  Xgeva q.6 weeks. Next dose 01/23/18     Return to clinic in 3 weeks for Herceptin and xgeva    Chart documentation with Dragon Voice recognition Software. Although reviewed after completion, some words and grammatical errors may remain.    Again, thank you for allowing me to participate in the care of your patient.      Sincerely,    Robb Wood MD

## 2018-01-08 ENCOUNTER — HOSPITAL ENCOUNTER (OUTPATIENT)
Dept: OCCUPATIONAL THERAPY | Facility: CLINIC | Age: 63
Setting detail: THERAPIES SERIES
End: 2018-01-08
Attending: INTERNAL MEDICINE
Payer: COMMERCIAL

## 2018-01-08 PROCEDURE — 97140 MANUAL THERAPY 1/> REGIONS: CPT | Mod: GO

## 2018-01-08 PROCEDURE — 40000445 ZZHC STATISTIC OT VISIT, LYMPHEDEMA

## 2018-01-09 ENCOUNTER — HOSPITAL ENCOUNTER (OUTPATIENT)
Dept: OCCUPATIONAL THERAPY | Facility: CLINIC | Age: 63
Setting detail: THERAPIES SERIES
End: 2018-01-09
Attending: INTERNAL MEDICINE
Payer: COMMERCIAL

## 2018-01-09 PROCEDURE — 97535 SELF CARE MNGMENT TRAINING: CPT | Mod: GO | Performed by: OCCUPATIONAL THERAPIST

## 2018-01-09 PROCEDURE — 40000445 ZZHC STATISTIC OT VISIT, LYMPHEDEMA: Performed by: OCCUPATIONAL THERAPIST

## 2018-01-09 PROCEDURE — 97140 MANUAL THERAPY 1/> REGIONS: CPT | Mod: GO | Performed by: OCCUPATIONAL THERAPIST

## 2018-01-10 ENCOUNTER — HOSPITAL ENCOUNTER (OUTPATIENT)
Dept: OCCUPATIONAL THERAPY | Facility: CLINIC | Age: 63
Setting detail: THERAPIES SERIES
End: 2018-01-10
Attending: INTERNAL MEDICINE
Payer: COMMERCIAL

## 2018-01-10 PROCEDURE — 40000445 ZZHC STATISTIC OT VISIT, LYMPHEDEMA

## 2018-01-10 PROCEDURE — 97140 MANUAL THERAPY 1/> REGIONS: CPT | Mod: GO

## 2018-01-11 ENCOUNTER — TELEPHONE (OUTPATIENT)
Dept: GASTROENTEROLOGY | Facility: CLINIC | Age: 63
End: 2018-01-11

## 2018-01-11 ENCOUNTER — HOSPITAL ENCOUNTER (OUTPATIENT)
Dept: OCCUPATIONAL THERAPY | Facility: CLINIC | Age: 63
Setting detail: THERAPIES SERIES
End: 2018-01-11
Attending: INTERNAL MEDICINE
Payer: COMMERCIAL

## 2018-01-11 DIAGNOSIS — Z12.11 ENCOUNTER FOR SCREENING COLONOSCOPY: Primary | ICD-10-CM

## 2018-01-11 PROCEDURE — 97535 SELF CARE MNGMENT TRAINING: CPT | Mod: GO

## 2018-01-11 PROCEDURE — 97140 MANUAL THERAPY 1/> REGIONS: CPT | Mod: GO

## 2018-01-11 PROCEDURE — 40000445 ZZHC STATISTIC OT VISIT, LYMPHEDEMA

## 2018-01-11 NOTE — TELEPHONE ENCOUNTER
Patient scheduled for colonoscopy    Indication for procedure. screening    Referring Provider. Dr. Webb    ? Yes, Icelandic    Arrival time verified? yes    Facility location verified? 7:00 am    Instructions given regarding prep and procedure    Prep Type golytely    Are you taking any anticoagulants or blood thinners? no    Instructions given? yes    Electronic implanted devices? no    Pre procedure teaching completed? Yes    Transportation from procedure? Yes, daughter or father    H&P / Pre op physical completed? Na    Azeb Macdonald RN

## 2018-01-12 ENCOUNTER — HOSPITAL ENCOUNTER (OUTPATIENT)
Dept: OCCUPATIONAL THERAPY | Facility: CLINIC | Age: 63
Setting detail: THERAPIES SERIES
End: 2018-01-12
Attending: INTERNAL MEDICINE
Payer: COMMERCIAL

## 2018-01-12 PROCEDURE — 97140 MANUAL THERAPY 1/> REGIONS: CPT | Mod: GO | Performed by: OCCUPATIONAL THERAPIST

## 2018-01-12 PROCEDURE — 40000445 ZZHC STATISTIC OT VISIT, LYMPHEDEMA: Performed by: OCCUPATIONAL THERAPIST

## 2018-01-12 PROCEDURE — 97535 SELF CARE MNGMENT TRAINING: CPT | Mod: GO | Performed by: OCCUPATIONAL THERAPIST

## 2018-01-15 ENCOUNTER — HOSPITAL ENCOUNTER (OUTPATIENT)
Dept: OCCUPATIONAL THERAPY | Facility: CLINIC | Age: 63
Setting detail: THERAPIES SERIES
End: 2018-01-15
Attending: INTERNAL MEDICINE
Payer: COMMERCIAL

## 2018-01-15 PROCEDURE — 40000445 ZZHC STATISTIC OT VISIT, LYMPHEDEMA

## 2018-01-15 PROCEDURE — 97140 MANUAL THERAPY 1/> REGIONS: CPT | Mod: GO

## 2018-01-15 PROCEDURE — 97535 SELF CARE MNGMENT TRAINING: CPT | Mod: GO

## 2018-01-16 ENCOUNTER — HOSPITAL ENCOUNTER (OUTPATIENT)
Dept: OCCUPATIONAL THERAPY | Facility: CLINIC | Age: 63
Setting detail: THERAPIES SERIES
End: 2018-01-16
Attending: INTERNAL MEDICINE
Payer: COMMERCIAL

## 2018-01-16 PROCEDURE — 40000445 ZZHC STATISTIC OT VISIT, LYMPHEDEMA: Performed by: OCCUPATIONAL THERAPIST

## 2018-01-16 PROCEDURE — 97140 MANUAL THERAPY 1/> REGIONS: CPT | Mod: GO | Performed by: OCCUPATIONAL THERAPIST

## 2018-01-17 ENCOUNTER — HOSPITAL ENCOUNTER (OUTPATIENT)
Dept: OCCUPATIONAL THERAPY | Facility: CLINIC | Age: 63
Setting detail: THERAPIES SERIES
End: 2018-01-17
Attending: INTERNAL MEDICINE
Payer: COMMERCIAL

## 2018-01-17 PROCEDURE — 97140 MANUAL THERAPY 1/> REGIONS: CPT | Mod: GO

## 2018-01-17 PROCEDURE — 40000445 ZZHC STATISTIC OT VISIT, LYMPHEDEMA

## 2018-01-18 ENCOUNTER — HOSPITAL ENCOUNTER (OUTPATIENT)
Dept: OCCUPATIONAL THERAPY | Facility: CLINIC | Age: 63
Setting detail: THERAPIES SERIES
End: 2018-01-18
Attending: INTERNAL MEDICINE
Payer: COMMERCIAL

## 2018-01-18 PROCEDURE — 40000445 ZZHC STATISTIC OT VISIT, LYMPHEDEMA

## 2018-01-18 PROCEDURE — 97140 MANUAL THERAPY 1/> REGIONS: CPT | Mod: GO

## 2018-01-19 ENCOUNTER — HOSPITAL ENCOUNTER (OUTPATIENT)
Dept: OCCUPATIONAL THERAPY | Facility: CLINIC | Age: 63
Setting detail: THERAPIES SERIES
End: 2018-01-19
Attending: INTERNAL MEDICINE
Payer: COMMERCIAL

## 2018-01-19 PROCEDURE — 40000445 ZZHC STATISTIC OT VISIT, LYMPHEDEMA: Performed by: OCCUPATIONAL THERAPIST

## 2018-01-19 PROCEDURE — 97140 MANUAL THERAPY 1/> REGIONS: CPT | Mod: GO | Performed by: OCCUPATIONAL THERAPIST

## 2018-01-24 ENCOUNTER — ONCOLOGY VISIT (OUTPATIENT)
Dept: ONCOLOGY | Facility: CLINIC | Age: 63
End: 2018-01-24
Attending: INTERNAL MEDICINE
Payer: COMMERCIAL

## 2018-01-24 VITALS
OXYGEN SATURATION: 95 % | SYSTOLIC BLOOD PRESSURE: 123 MMHG | HEIGHT: 62 IN | BODY MASS INDEX: 32.35 KG/M2 | WEIGHT: 175.8 LBS | DIASTOLIC BLOOD PRESSURE: 79 MMHG | HEART RATE: 85 BPM | TEMPERATURE: 98.7 F

## 2018-01-24 DIAGNOSIS — C50.912 MALIGNANT NEOPLASM OF LEFT FEMALE BREAST, UNSPECIFIED ESTROGEN RECEPTOR STATUS, UNSPECIFIED SITE OF BREAST (H): Primary | ICD-10-CM

## 2018-01-24 DIAGNOSIS — C79.51 BONE METASTASIS: ICD-10-CM

## 2018-01-24 DIAGNOSIS — C50.919 METASTATIC BREAST CANCER: Primary | ICD-10-CM

## 2018-01-24 LAB
ALBUMIN SERPL-MCNC: 3.2 G/DL (ref 3.4–5)
ALP SERPL-CCNC: 37 U/L (ref 40–150)
ALT SERPL W P-5'-P-CCNC: 21 U/L (ref 0–50)
ANION GAP SERPL CALCULATED.3IONS-SCNC: 6 MMOL/L (ref 3–14)
AST SERPL W P-5'-P-CCNC: 20 U/L (ref 0–45)
BASOPHILS # BLD AUTO: 0 10E9/L (ref 0–0.2)
BASOPHILS NFR BLD AUTO: 0.4 %
BILIRUB SERPL-MCNC: 0.2 MG/DL (ref 0.2–1.3)
BUN SERPL-MCNC: 18 MG/DL (ref 7–30)
CALCIUM SERPL-MCNC: 8.6 MG/DL (ref 8.5–10.1)
CANCER AG27-29 SERPL-ACNC: 7 U/ML (ref 0–39)
CEA SERPL-MCNC: <0.5 UG/L (ref 0–2.5)
CHLORIDE SERPL-SCNC: 107 MMOL/L (ref 94–109)
CO2 SERPL-SCNC: 27 MMOL/L (ref 20–32)
CREAT SERPL-MCNC: 0.81 MG/DL (ref 0.52–1.04)
DIFFERENTIAL METHOD BLD: ABNORMAL
EOSINOPHIL # BLD AUTO: 0.3 10E9/L (ref 0–0.7)
EOSINOPHIL NFR BLD AUTO: 4.1 %
ERYTHROCYTE [DISTWIDTH] IN BLOOD BY AUTOMATED COUNT: 13.2 % (ref 10–15)
GFR SERPL CREATININE-BSD FRML MDRD: 72 ML/MIN/1.7M2
GLUCOSE SERPL-MCNC: 76 MG/DL (ref 70–99)
HCT VFR BLD AUTO: 38.3 % (ref 35–47)
HGB BLD-MCNC: 12 G/DL (ref 11.7–15.7)
IMM GRANULOCYTES # BLD: 0 10E9/L (ref 0–0.4)
IMM GRANULOCYTES NFR BLD: 0.2 %
LYMPHOCYTES # BLD AUTO: 3 10E9/L (ref 0.8–5.3)
LYMPHOCYTES NFR BLD AUTO: 36.8 %
MCH RBC QN AUTO: 29.6 PG (ref 26.5–33)
MCHC RBC AUTO-ENTMCNC: 31.3 G/DL (ref 31.5–36.5)
MCV RBC AUTO: 95 FL (ref 78–100)
MONOCYTES # BLD AUTO: 0.6 10E9/L (ref 0–1.3)
MONOCYTES NFR BLD AUTO: 7.6 %
NEUTROPHILS # BLD AUTO: 4.1 10E9/L (ref 1.6–8.3)
NEUTROPHILS NFR BLD AUTO: 50.9 %
NRBC # BLD AUTO: 0 10*3/UL
NRBC BLD AUTO-RTO: 0 /100
PLATELET # BLD AUTO: 155 10E9/L (ref 150–450)
PLATELET # BLD EST: ABNORMAL 10*3/UL
POTASSIUM SERPL-SCNC: 3.8 MMOL/L (ref 3.4–5.3)
PROT SERPL-MCNC: 7.8 G/DL (ref 6.8–8.8)
RBC # BLD AUTO: 4.05 10E12/L (ref 3.8–5.2)
SODIUM SERPL-SCNC: 140 MMOL/L (ref 133–144)
WBC # BLD AUTO: 8 10E9/L (ref 4–11)

## 2018-01-24 PROCEDURE — 99214 OFFICE O/P EST MOD 30 MIN: CPT | Mod: ZP | Performed by: PHYSICIAN ASSISTANT

## 2018-01-24 PROCEDURE — T1013 SIGN LANG/ORAL INTERPRETER: HCPCS | Mod: U3,ZF

## 2018-01-24 PROCEDURE — 96372 THER/PROPH/DIAG INJ SC/IM: CPT | Mod: 59

## 2018-01-24 PROCEDURE — 96413 CHEMO IV INFUSION 1 HR: CPT

## 2018-01-24 PROCEDURE — 80053 COMPREHEN METABOLIC PANEL: CPT | Performed by: INTERNAL MEDICINE

## 2018-01-24 PROCEDURE — 25000128 H RX IP 250 OP 636: Mod: ZF | Performed by: INTERNAL MEDICINE

## 2018-01-24 PROCEDURE — G0463 HOSPITAL OUTPT CLINIC VISIT: HCPCS | Mod: ZF

## 2018-01-24 PROCEDURE — 86300 IMMUNOASSAY TUMOR CA 15-3: CPT | Performed by: INTERNAL MEDICINE

## 2018-01-24 PROCEDURE — 82378 CARCINOEMBRYONIC ANTIGEN: CPT | Performed by: INTERNAL MEDICINE

## 2018-01-24 PROCEDURE — 25000128 H RX IP 250 OP 636: Mod: ZF | Performed by: PHYSICIAN ASSISTANT

## 2018-01-24 PROCEDURE — 85025 COMPLETE CBC W/AUTO DIFF WBC: CPT | Performed by: INTERNAL MEDICINE

## 2018-01-24 PROCEDURE — T1013 SIGN LANG/ORAL INTERPRETER: HCPCS | Mod: U3

## 2018-01-24 RX ORDER — METHYLPREDNISOLONE SODIUM SUCCINATE 125 MG/2ML
125 INJECTION, POWDER, LYOPHILIZED, FOR SOLUTION INTRAMUSCULAR; INTRAVENOUS
Status: CANCELLED
Start: 2018-01-26

## 2018-01-24 RX ORDER — EPINEPHRINE 0.3 MG/.3ML
0.3 INJECTION SUBCUTANEOUS EVERY 5 MIN PRN
Status: CANCELLED | OUTPATIENT
Start: 2018-01-26

## 2018-01-24 RX ORDER — SODIUM CHLORIDE 9 MG/ML
1000 INJECTION, SOLUTION INTRAVENOUS CONTINUOUS PRN
Status: CANCELLED
Start: 2018-01-26

## 2018-01-24 RX ORDER — DIPHENHYDRAMINE HCL 25 MG
50 CAPSULE ORAL ONCE
Status: CANCELLED
Start: 2018-01-26

## 2018-01-24 RX ORDER — DIPHENHYDRAMINE HYDROCHLORIDE 50 MG/ML
50 INJECTION INTRAMUSCULAR; INTRAVENOUS
Status: CANCELLED
Start: 2018-01-26

## 2018-01-24 RX ORDER — HEPARIN SODIUM (PORCINE) LOCK FLUSH IV SOLN 100 UNIT/ML 100 UNIT/ML
5 SOLUTION INTRAVENOUS EVERY 8 HOURS
Status: DISCONTINUED | OUTPATIENT
Start: 2018-01-24 | End: 2018-01-24 | Stop reason: HOSPADM

## 2018-01-24 RX ORDER — HEPARIN SODIUM (PORCINE) LOCK FLUSH IV SOLN 100 UNIT/ML 100 UNIT/ML
5 SOLUTION INTRAVENOUS ONCE
Status: COMPLETED | OUTPATIENT
Start: 2018-01-24 | End: 2018-01-24

## 2018-01-24 RX ORDER — LORAZEPAM 2 MG/ML
0.5 INJECTION INTRAMUSCULAR EVERY 4 HOURS PRN
Status: CANCELLED
Start: 2018-01-26

## 2018-01-24 RX ORDER — ALBUTEROL SULFATE 0.83 MG/ML
2.5 SOLUTION RESPIRATORY (INHALATION)
Status: CANCELLED | OUTPATIENT
Start: 2018-01-26

## 2018-01-24 RX ORDER — ACETAMINOPHEN 325 MG/1
650 TABLET ORAL
Status: CANCELLED | OUTPATIENT
Start: 2018-01-26

## 2018-01-24 RX ORDER — HEPARIN SODIUM (PORCINE) LOCK FLUSH IV SOLN 100 UNIT/ML 100 UNIT/ML
5 SOLUTION INTRAVENOUS EVERY 8 HOURS
Status: CANCELLED | OUTPATIENT
Start: 2018-01-26

## 2018-01-24 RX ORDER — EPINEPHRINE 1 MG/ML
0.3 INJECTION, SOLUTION, CONCENTRATE INTRAVENOUS EVERY 5 MIN PRN
Status: CANCELLED | OUTPATIENT
Start: 2018-01-26

## 2018-01-24 RX ORDER — MEPERIDINE HYDROCHLORIDE 25 MG/ML
25 INJECTION INTRAMUSCULAR; INTRAVENOUS; SUBCUTANEOUS EVERY 30 MIN PRN
Status: CANCELLED | OUTPATIENT
Start: 2018-01-26

## 2018-01-24 RX ORDER — ALBUTEROL SULFATE 90 UG/1
1-2 AEROSOL, METERED RESPIRATORY (INHALATION)
Status: CANCELLED
Start: 2018-01-26

## 2018-01-24 RX ADMIN — SODIUM CHLORIDE, PRESERVATIVE FREE 5 ML: 5 INJECTION INTRAVENOUS at 14:16

## 2018-01-24 RX ADMIN — SODIUM CHLORIDE, PRESERVATIVE FREE 5 ML: 5 INJECTION INTRAVENOUS at 11:42

## 2018-01-24 RX ADMIN — TRASTUZUMAB 450 MG: 150 INJECTION, POWDER, LYOPHILIZED, FOR SOLUTION INTRAVENOUS at 13:44

## 2018-01-24 RX ADMIN — DENOSUMAB 120 MG: 120 INJECTION SUBCUTANEOUS at 14:12

## 2018-01-24 ASSESSMENT — PAIN SCALES - GENERAL: PAINLEVEL: NO PAIN (0)

## 2018-01-24 NOTE — MR AVS SNAPSHOT
After Visit Summary   1/24/2018    Hoda Brush    MRN: [id number]           Patient Information     Date Of Birth          1955        Visit Information        Provider Department      1/24/2018 1:00 PM Wesley Iraheta;  25 ATC;  ONCOLOGY INFUSION  Services Department        Today's Diagnoses     Malignant neoplasm of left female breast, unspecified estrogen receptor status, unspecified site of breast (H)    -  1    Bone metastasis (H)          Care Instructions    Contact Numbers    [facility] [place]: [phone]  [facility] [place]:  [phone]    Call triage with chills and/or temperature greater than or equal to 100.5, uncontrolled nausea/vomiting, diarrhea, constipation, dizziness, shortness of breath, chest pain, bleeding, unexplained bruising, or any new/concerning symptoms, questions/concerns.     If you are having any concerning symptoms or wish to speak to a provider before your next infusion visit, please call your care coordinator or triage to notify them so we can adequately serve you.       After Hours: [phone]    If after hours, weekends, or holidays, call main hospital  and ask for Oncology doctor on call.           January 2018 Sunday Monday Tuesday Wednesday Thursday Friday Saturday        1     2     3     4     5     [facility]   11:00 AM   (30 min.)   [facility] LAB DRAW   [facility] Lab Draw     [facility] RETURN   11:30 AM   (90 min.)   Robb Wood MD   [facility] ONC INFUSION 60    1:00 PM   (120 min.)    ONCOLOGY INFUSION   [facility] 6       7     8     LYMPHEDEMA TREATMENT    1:00 PM   (90 min.)   Sarai Potter, OT   [facility] Lymphed[facility] OT 9     LYMPHEDEMA TREATMENT    1:00 PM   (90 min.)   China Dent, OT   [facility] [place] Lymphedema OT 10     LYMPHEDEMA TREATMENT    1:15 PM   (90 min.)   Sarai Potter, OT   [facility] Lymphedema OT 11      LYMPHEDEMA TREATMENT    1:15 PM   (90 min.)   Sarai Potter, OT   [place] Lymphedema OT 12     LYMPHEDEMA TREATMENT   12:00 PM   (135 min.)   China Dent, OT   [place] Lymphedema OT 13       14     15     LYMPHEDEMA TREATMENT    1:15 PM   (90 min.)   Sarai Potter, OT   [place] Lymphedema OT 16     LYMPHEDEMA TREATMENT    1:15 PM   (90 min.)   China Dent, OT   [place] Lymphedema OT 17     LYMPHEDEMA TREATMENT    1:15 PM   (135 min.)   Sarai Potter, OT   [place] Lymphedema OT 18     LYMPHEDEMA TREATMENT    1:15 PM   (90 min.)   Sarai Potter, OT   [place] Lymphedema OT 19     LYMPHEDEMA TREATMENT   11:45 AM   (90 min.)   China Dent, OT   [place] Lymphedema OT 20       21     22     23     24     [facility] DRAW   11:30 AM   (30 min.)    [facility]   [facility]     [facility] RETURN   11:55 AM   (65 min.)   Anne Lea PA-C   [facility]     [facility] ONC INFUSION 60    1:00 PM   (90 min.)    ONCOLOGY INFUSION   [facility] 25     26     27       28     29     30     31 February 2018 Sunday Monday Tuesday Wednesday Thursday Friday Saturday                       1     [place] [facility]    7:45 AM   (180 min.)   Nichole Agustin MD    Services Department     COLONOSCOPY    8:45 AM   Phillip Rowe MD   [facility] 2     3       4     5     6     7     8     9     10       11     12     13     [facility] DRAW    7:30 AM   (15 min.)   [facility]   [facility]     [facility] RETURN    7:45 AM   (30 min.)   Lisandro Aguiar MD   [facility]     [facility] ONC INFUSION 60    9:00 AM   (60 min.)   [facility] ONCOLOGY INFUSION   [facility] 14     15     16     17       18  Happy Birthday!     19     20     21     22     23     LYMPHEDEMA  TREATMENT   10:30 AM   (60 min.)   China Dent, OT   [facility] Lymphedema OT 24       25     26     27     28                                Recent Results (from the past 24 hour(s))   CBC with platelets differential    Collection Time: 01/24/18 11:51 AM   Result Value Ref Range    WBC 8.0 4.0 - 11.0 10e9/L    RBC Count 4.05 3.8 - 5.2 10e12/L    Hemoglobin 12.0 11.7 - 15.7 g/dL    Hematocrit 38.3 35.0 - 47.0 %    MCV 95 78 - 100 fl    MCH 29.6 26.5 - 33.0 pg    MCHC 31.3 (L) 31.5 - 36.5 g/dL    RDW 13.2 10.0 - 15.0 %    Platelet Count 155 150 - 450 10e9/L    Diff Method Automated Method     % Neutrophils 50.9 %    % Lymphocytes 36.8 %    % Monocytes 7.6 %    % Eosinophils 4.1 %    % Basophils 0.4 %    % Immature Granulocytes 0.2 %    Nucleated RBCs 0 0 /100    Absolute Neutrophil 4.1 1.6 - 8.3 10e9/L    Absolute Lymphocytes 3.0 0.8 - 5.3 10e9/L    Absolute Monocytes 0.6 0.0 - 1.3 10e9/L    Absolute Eosinophils 0.3 0.0 - 0.7 10e9/L    Absolute Basophils 0.0 0.0 - 0.2 10e9/L    Abs Immature Granulocytes 0.0 0 - 0.4 10e9/L    Absolute Nucleated RBC 0.0     Platelet Estimate Confirming automated cell count    Comprehensive metabolic panel    Collection Time: 01/24/18 11:51 AM   Result Value Ref Range    Sodium 140 133 - 144 mmol/L    Potassium 3.8 3.4 - 5.3 mmol/L    Chloride 107 94 - 109 mmol/L    Carbon Dioxide 27 20 - 32 mmol/L    Anion Gap 6 3 - 14 mmol/L    Glucose 76 70 - 99 mg/dL    Urea Nitrogen 18 7 - 30 mg/dL    Creatinine 0.81 0.52 - 1.04 mg/dL    GFR Estimate 72 >60 mL/min/1.7m2    GFR Estimate If Black 87 >60 mL/min/1.7m2    Calcium 8.6 8.5 - 10.1 mg/dL    Bilirubin Total 0.2 0.2 - 1.3 mg/dL    Albumin 3.2 (L) 3.4 - 5.0 g/dL    Protein Total 7.8 6.8 - 8.8 g/dL    Alkaline Phosphatase 37 (L) 40 - 150 U/L    ALT 21 0 - 50 U/L    AST 20 0 - 45 U/L                 Follow-ups after your visit        Your next 10 appointments already scheduled     Feb 01, 2018   Procedure with Phillip MENDOZA  MD [place]   [facility] [place], Endoscopy ([facility])    [address] [facility] [address]   [phone]           The [facility] is located on the corner of [address] and [address] on the [facility]. It is easily accessible from virtually any point in the [place] area, via [address] and [address].            Feb 13, 2018  7:30 AM CST   [facility] Draw with [facility] DRAW   [facility] Lab Draw ([facility])    [address] [place] [address]  [address]  [facility] [address]   [phone]            Feb 13, 2018  8:00 AM CST   (Arrive by 7:45 AM)   Return Visit with Lisandro Aguiar MD   [facility] ([facility])    [address] [place] [address]  [address]  [facility] [address] [phone]            Feb 13, 2018  9:00 AM CST   Infusion 60 with  ONCOLOGY INFUSION, [facility] 26 ATC   [facility] ([facility])    [address] [place] [address]  [address]  [facility] [address]   [phone]            Feb 23, 2018 10:30 AM CST   Lymphedema Treatment with China Dent OT   [facility] Lymphedema OT ([facility])    [address]  [facility] [id number] [phone]              Who to contact     If you have questions or need follow up information about today's clinic visit or your schedule please contact [facility] directly at [phone].  Normal or non-critical lab and imaging results will be communicated to you by MyChart, letter or phone within 4 business days after the clinic has received the results. If you do not hear from us within 7 days, please contact the clinic through MyChart or phone. If you have a critical or abnormal lab result, we will notify you by phone as soon as possible.  Submit refill requests through [employer] or call your pharmacy and they will  forward the refill request to us. Please allow 3 business days for your refill to be completed.          Additional Information About Your Visit        [employer] Information     [employer] gives you secure access to your electronic health record. If you see a primary care provider, you can also send messages to your care team and make appointments. If you have questions, please call your primary care clinic.  If you do not have a primary care provider, please call [phone] and they will assist you.        Care EveryWhere ID     This is your Care EveryWhere ID. This could be used by other organizations to access your [facility] medical records  [id number]         Blood Pressure from Last 3 Encounters:   01/24/18 123/79   01/05/18 137/66   12/12/17 112/70    Weight from Last 3 Encounters:   01/24/18 79.7 kg (175 lb 12.8 oz)   01/05/18 80.6 kg (177 lb 9.6 oz)   12/12/17 79.2 kg (174 lb 11.2 oz)              We Performed the Following     CA 27.29 Breast tumor marker     CBC with platelets differential     CEA     Comprehensive metabolic panel        Primary Care Provider Fax #    Physician No Ref-Primary [phone]       No address on file        Equal Access to Services     NOE VAZQUEZ : Hadii kamran Ramsey, waspencerda li, qaybta kaalmada franky, alexa angulo . So [facility] [phone].    ATENCIÓN: Si habla español, tiene a lagunas disposición servicios gratuitos de asistencia lingüística. Llame al [phone].    We comply with applicable federal civil rights laws and [place] laws. We do not discriminate on the basis of race, color, national origin, age, disability, sex, sexual orientation, or gender identity.            Thank you!     Thank you for choosing [facility]  for your care. Our goal is always to provide you with excellent care. Hearing back from our patients is one way we can continue to improve our services. Please take a few minutes to complete  the written survey that you may receive in the mail after your visit with us. Thank you!             Your Updated Medication List - Protect others around you: Learn how to safely use, store and throw away your medicines at www.disposemPrezieds.org.          This list is accurate as of 1/24/18  2:21 PM.  Always use your most recent med list.                   Brand Name Dispense Instructions for use Diagnosis    cephALEXin 500 MG capsule    KEFLEX    56 capsule    Take 1 capsule (500 mg) by mouth every 6 hours    Cellulitis of left upper extremity       order for DME     1 each    L UE class 2 compression sleeve and gauntlet Night garment Bandaging supplies    Lymphedema of left upper extremity       tamoxifen 20 MG tablet    NOLVADEX    90 tablet    Take 1 tablet (20 mg) by mouth daily    Cancer of central portion of left breast (H)       VITAMIN D (CHOLECALCIFEROL) PO      Take 1,000 Units by mouth daily

## 2018-01-24 NOTE — NURSING NOTE
"Oncology Rooming Note    January 24, 2018 12:00 PM   Hoda Brush is a 62 year old female who presents for:    Chief Complaint   Patient presents with     Port Draw     labs drawn via port by RN     Oncology Clinic Visit     f/u Mets breast ca     Initial Vitals: /79 (BP Location: Right arm, Patient Position: Chair, Cuff Size: Adult Large)  Pulse 85  Temp 98.7  F (37.1  C) (Oral)  Ht 1.575 m (5' 2.01\")  Wt 79.7 kg (175 lb 12.8 oz)  SpO2 95%  BMI 32.15 kg/m2 Estimated body mass index is 32.15 kg/(m^2) as calculated from the following:    Height as of this encounter: 1.575 m (5' 2.01\").    Weight as of this encounter: 79.7 kg (175 lb 12.8 oz). Body surface area is 1.87 meters squared.  No Pain (0) Comment: Data Unavailable   No LMP recorded. Patient is postmenopausal.  Allergies reviewed: Yes  Medications reviewed: Yes    Medications: Medication refills not needed today.  Pharmacy name entered into [employer]:    [id number] [place], [place] - [address] [facility] - [place], [place] - [address] AT [facility] [address] [place], [place] [address] - [place], [place] - [address] AT  [address] [place] [address]    Clinical concerns: none Anne was NOT notified.    10 minutes for nursing intake (face to face time)     JAMESON ROJAS LPN            "

## 2018-01-24 NOTE — MR AVS SNAPSHOT
After Visit Summary   1/24/2018    Hoda Brush    MRN: [id number]           Patient Information     Date Of Birth          1955        Visit Information        Provider Department      1/24/2018 11:55 AM Wesley Iraheta; Anne Lea PA-C [facility]        Today's Diagnoses     Metastatic breast cancer (H)    -  1    Bone metastasis (H)           Follow-ups after your visit        Your next 10 appointments already scheduled     Jan 24, 2018  1:00 PM CST   Infusion 60 with UC ONCOLOGY INFUSION, UC 25 ATC   [facility]    [address] [place] [address]  [address]  [facility] [address]   [phone]            Feb 01, 2018   Procedure with Phillip Rowe MD   [facility] Endoscopy ([facility])    [address] [facility] [address]   [phone]           The [facility] is located on the corner of [facility] and [facility] on the [facility]. It is easily accessible from virtually any point in the [place], via [address]            Feb 13, 2018  7:30 AM CST   [facility] Draw with  [facility] DRAW   [facility]    [address] [place] [address]  [address]  [facility] [address]   [phone]            Feb 13, 2018  8:00 AM CST   (Arrive by 7:45 AM)   Return Visit with Lisandro Aguiar MD   [facility]    [address] [place] [address]  [address]  [facility] [address]   [phone]            Feb 13, 2018  9:00 AM CST   Infusion 60 with UC ONCOLOGY INFUSION, UC 26 ATC   [facility]    [address] [place] [address]  [address] [place] [address]   [phone]            Feb 23, 2018 10:30 AM CST   Lymphedema Treatment with  "China Dent, OT   [facility] Lymphedema OT ([facility])    [address] [place] [address]  [address]  [facility] [address] [phone]              Who to contact     If you have questions or need follow up information about today's clinic visit or your schedule please contact [facility] directly at [phone].  Normal or non-critical lab and imaging results will be communicated to you by [employer], letter or phone within 4 business days after the clinic has received the results. If you do not hear from us within 7 days, please contact the clinic through [employer] or phone. If you have a critical or abnormal lab result, we will notify you by phone as soon as possible.  Submit refill requests through [facility] or call your pharmacy and they will forward the refill request to us. Please allow 3 business days for your refill to be completed.          Additional Information About Your Visit        [employer] Information     [facility] gives you secure access to your electronic health record. If you see a primary care provider, you can also send messages to your care team and make appointments. If you have questions, please call your primary care clinic.  If you do not have a primary care provider, please call [phone] and they will assist you.        Care EveryWhere ID     This is your Care EveryWhere ID. This could be used by other organizations to access your [facility] medical records  [id number]        Your Vitals Were     Pulse Temperature Height Pulse Oximetry BMI (Body Mass Index)       85 98.7  F (37.1  C) (Oral) 1.575 m (5' 2.01\") 95% 32.15 kg/m2        Blood Pressure from Last 3 Encounters:   01/24/18 123/79   01/05/18 137/66   12/12/17 112/70    Weight from Last 3 Encounters:   01/24/18 79.7 kg (175 lb 12.8 oz)   01/05/18 80.6 kg (177 lb 9.6 oz)   12/12/17 79.2 kg (174 lb 11.2 oz)              Today, you had the following     No orders found for display       Primary Care Provider Fax # "    Physician No Ref-Primary [phone]       No address on file        Equal Access to Services     RICHARDTONEY TAYLOR : Hadii aad ku hadsreedharanita Ramsey, waspencerda aparnasoyha, jaynejoan sandersonnicoleteo camachokarinateo, alexa saldañawillisana paula beltran. So [facility] [phone].    ATENCIÓN: Si habla español, tiene a lagunas disposición servicios gratuitos de asistencia lingüística. Llame al [phone].    We comply with applicable federal civil rights laws and [place] laws. We do not discriminate on the basis of race, color, national origin, age, disability, sex, sexual orientation, or gender identity.            Thank you!     Thank you for choosing [facility]  for your care. Our goal is always to provide you with excellent care. Hearing back from our patients is one way we can continue to improve our services. Please take a few minutes to complete the written survey that you may receive in the mail after your visit with us. Thank you!             Your Updated Medication List - Protect others around you: Learn how to safely use, store and throw away your medicines at www.disposemymeds.org.          This list is accurate as of 1/24/18 12:38 PM.  Always use your most recent med list.                   Brand Name Dispense Instructions for use Diagnosis    cephALEXin 500 MG capsule    KEFLEX    56 capsule    Take 1 capsule (500 mg) by mouth every 6 hours    Cellulitis of left upper extremity       order for DME     1 each    L UE class 2 compression sleeve and gauntlet Night garment Bandaging supplies    Lymphedema of left upper extremity       tamoxifen 20 MG tablet    NOLVADEX    90 tablet    Take 1 tablet (20 mg) by mouth daily    Cancer of central portion of left breast (H)       VITAMIN D (CHOLECALCIFEROL) PO      Take 1,000 Units by mouth daily

## 2018-01-24 NOTE — PATIENT INSTRUCTIONS
Contact Numbers    [facility] Main Line: [phone]  [facility] Triage:  [phone]    Call triage with chills and/or temperature greater than or equal to 100.5, uncontrolled nausea/vomiting, diarrhea, constipation, dizziness, shortness of breath, chest pain, bleeding, unexplained bruising, or any new/concerning symptoms, questions/concerns.     If you are having any concerning symptoms or wish to speak to a provider before your next infusion visit, please call your care coordinator or triage to notify them so we can adequately serve you.       After Hours: [phone]    If after hours, weekends, or holidays, call main hospital  and ask for Oncology doctor on call.           January 2018 Sunday Monday Tuesday Wednesday Thursday Friday Saturday        1     2     3     4     5     [facility] LAB DRAW   11:00 AM   (30 min.)    [facility] LAB DRAW   [facility] Lab Draw     [facility] RETURN   11:30 AM   (90 min.)   Rob[facility] Wood [facility]    1:00 PM   (120 min.)    ONCOLOGY INFUSION   [facility] 6       7     8     LYMPHEDEMA TREATMENT    1:00 PM   (90 min.)   Sarai Potter OT   [facility] Lymphedema OT 9     LYMPHEDEMA TREATMENT    1:00 PM   (90 min.)   China Dent OT   [facility] Lymphedema OT 10     LYMPHEDEMA TREATMENT    1:15 PM   (90 min.)   Sarai Potter OT   [facility] Lymphedema OT 11     LYMPHEDEMA TREATMENT    1:15 PM   (90 min.)   Sarai Potter OT   [facility] Lymphedema OT 12     LYMPHEDEMA TREATMENT   12:00 PM   (135 min.)   China Dent OT   [facility] Lymphedema OT 13       14     15     LYMPHEDEMA TREATMENT    1:15 PM   (90 min.)   Sarai Potter OT   [facility] Lymphed[facility] OT 16     LYMPHEDEMA TREATMENT    1:15 PM   (90 min.)   China Dent OT   [facility] Lymphed[facility] OT 17     LYMPHEDEMA TREATMENT    1:15 PM   (135 min.)   Abbey  Sarai HAMMOND, OT   [facility] Lymphedema OT 18     LYMPHEDEMA TREATMENT    1:15 PM   (90 min.)   Sarai Potter, OT   [facility] Lymphedema OT 19     LYMPHEDEMA TREATMENT   11:45 AM   (90 min.)   China Dent, OT   [facility] Lymphedema OT 20       21     22     23     24     [facility] DRAW   11:30 AM   (30 min.)    [facility] LAB DRAW   [facility] Lab Draw     [facility] RETURN   11:55 AM   (65 min.)   Anne Lea PA-C   [facility]    1:00 PM   (90 min.)    ONCOLOGY INFUSION   [facility] 25     26     27       28     29     30     31 February 2018 Sunday Monday Tuesday Wednesday Thursday Friday Saturday                       1     [place] [facility]    7:45 AM   (180 min.)   Nichole Agustin MD    Services Department     COLONOSCOPY    8:45 AM   Phillip Rowe MD   [facility] 2     3       4     5     6     7     8     9     10       11     12     13     [facility] LAB DRAW    7:30 AM   (15 min.)    [facility] LAB DRAW   [facility] Lab Draw     [facility] RETURN    7:45 AM   (30 min.)   Lisandro [facility]uiar [facility]   [facility]    9:00 AM   (60 min.)    ONCOLOGY INFUSION   [facility] 14     15     16     17       18  Happy Birthday!     19     20     21     22     23     LYMPHEDEMA TREATMENT   10:30 AM   (60 min.)   China Dent, OT   [facility] Lymphedema OT 24       25     26     27     28                                Recent Results (from the past 24 hour(s))   CBC with platelets differential    Collection Time: 01/24/18 11:51 AM   Result Value Ref Range    WBC 8.0 4.0 - 11.0 10e9/L    RBC Count 4.05 3.8 - 5.2 10e12/L    Hemoglobin 12.0 11.7 - 15.7 g/dL    Hematocrit 38.3 35.0 - 47.0 %    MCV 95 78 - 100 fl    MCH 29.6 26.5 - 33.0 pg    MCHC 31.3 (L) 31.5 - 36.5 g/dL     RDW 13.2 10.0 - 15.0 %    Platelet Count 155 150 - 450 10e9/L    Diff Method Automated Method     % Neutrophils 50.9 %    % Lymphocytes 36.8 %    % Monocytes 7.6 %    % Eosinophils 4.1 %    % Basophils 0.4 %    % Immature Granulocytes 0.2 %    Nucleated RBCs 0 0 /100    Absolute Neutrophil 4.1 1.6 - 8.3 10e9/L    Absolute Lymphocytes 3.0 0.8 - 5.3 10e9/L    Absolute Monocytes 0.6 0.0 - 1.3 10e9/L    Absolute Eosinophils 0.3 0.0 - 0.7 10e9/L    Absolute Basophils 0.0 0.0 - 0.2 10e9/L    Abs Immature Granulocytes 0.0 0 - 0.4 10e9/L    Absolute Nucleated RBC 0.0     Platelet Estimate Confirming automated cell count    Comprehensive metabolic panel    Collection Time: 01/24/18 11:51 AM   Result Value Ref Range    Sodium 140 133 - 144 mmol/L    Potassium 3.8 3.4 - 5.3 mmol/L    Chloride 107 94 - 109 mmol/L    Carbon Dioxide 27 20 - 32 mmol/L    Anion Gap 6 3 - 14 mmol/L    Glucose 76 70 - 99 mg/dL    Urea Nitrogen 18 7 - 30 mg/dL    Creatinine 0.81 0.52 - 1.04 mg/dL    GFR Estimate 72 >60 mL/min/1.7m2    GFR Estimate If Black 87 >60 mL/min/1.7m2    Calcium 8.6 8.5 - 10.1 mg/dL    Bilirubin Total 0.2 0.2 - 1.3 mg/dL    Albumin 3.2 (L) 3.4 - 5.0 g/dL    Protein Total 7.8 6.8 - 8.8 g/dL    Alkaline Phosphatase 37 (L) 40 - 150 U/L    ALT 21 0 - 50 U/L    AST 20 0 - 45 U/L

## 2018-01-24 NOTE — PROGRESS NOTES
"Hematology-Oncology Visit  Jan 24, 2018    Reason for Visit: follow-up metastatic breast cancer     HPI: Hoda Brush is a 62 year old female with metastatic ER positive, HER 2 positive left breast cancer with bone mets. She recently came to the  as a refugee and established care with Dr. Aguiar. She resumed treatment with Herceptin and is on daily tamoxifen. She is on Xgeva as well. She was admitted 9/20-9/21 with LUE cellulitis with leukocytosis. Given a dose of IV Ancef and discharged on keflex. Last restaging was 12/21/17 with PET/CT showing KIERA.     She presents prior to her next dose of Herceptin.      Interval History: Hoda is here with a professional Togolese  and feels well. She has no concerns today. She completed lymphedema therapy. Not sure how helpful it was. She continues to tolerate treatment well without any overt side effects. She continues to walk daily. She had nasal congestion with fatigue and body aches last week but is feeling better. No fevers/chills. She is eating and drinking well. No nausea or bowel changes. No rash, cough, SOB, CP. ROS otherwise negative.     Current Outpatient Prescriptions   Medication     tamoxifen (NOLVADEX) 20 MG tablet     VITAMIN D, CHOLECALCIFEROL, PO     order for DME     cephALEXin (KEFLEX) 500 MG capsule     No current facility-administered medications for this visit.        PHYSICAL EXAM:  /79 (BP Location: Right arm, Patient Position: Chair, Cuff Size: Adult Large)  Pulse 85  Temp 98.7  F (37.1  C) (Oral)  Ht 1.575 m (5' 2.01\")  Wt 79.7 kg (175 lb 12.8 oz)  SpO2 95%  BMI 32.15 kg/m2  General: Alert, oriented, pleasant, NAD  HEENT: NC/AT, no scleral icterus. MMM, no lesions or thrush  Neck: Supple, no cervical or supraclavicular adenopathy.  Axillary: No axillary adenopathy.   Lungs: CTA bilaterally, normal work of breathing  Cardiac: RRR, S1, S2, no murmurs  Abdomen: Soft, nontender, nondistended, + BS, no palpable " masses  Extremities: No pedal edema.   Skin: Left arm has some edema compared to R. No skin changes.    Labs:    1/24/2018 11:51   WBC 8.0   Hemoglobin 12.0   Hematocrit 38.3   Platelet Count 155   RBC Count 4.05   MCV 95   MCH 29.6   MCHC 31.3 (L)   RDW 13.2   Diff Method PENDING     CMP and tumor markers pending     Assessment & Plan:     1. Metastatic breast cancer, ER, HER2+ positive: Was last treated in [facility] with Herceptin. We have continued Herceptin every 3 weeks as well as daily tamoxifen. She tolerates treatment extremely well without overt symptoms. Was restaged in December with KIERA. Echocardiogram 12/2017 with EF 55-60% and normal LV function.  Continue Herceptin every 3 weeks. Continue Tamoxifen. RTC to see Dr. Aguiar in 3 weeks.     2. Bone metastasis: Continue xgeva every 6 weeks. Take calcium 1200 mg daily. She is on high dose vitamin D3 5000 IU daily per direction of Dr. Aguiar.   --Xgeva today.     3. LUE lymphedema: She completed lymphedema treatment and has a garment to use now.     Anne Lea PA-C  [facility]  [address] [place] [address] [id number] [phone]

## 2018-01-24 NOTE — LETTER
"1/24/2018      RE: Hoda Brush  [address] [facility] [address]       Hematology-Oncology Visit  Jan 24, 2018    Reason for Visit: follow-up metastatic breast cancer     HPI: Hoda Brush is a 62 year old female with metastatic ER positive, HER 2 positive left breast cancer with bone mets. She recently came to the  as a refugee and established care with Dr. Aguiar. She resumed treatment with Herceptin and is on daily tamoxifen. She is on Xgeva as well. She was admitted 9/20-9/21 with LUE cellulitis with leukocytosis. Given a dose of IV Ancef and discharged on keflex. Last restaging was 12/21/17 with PET/CT showing KIERA.     She presents prior to her next dose of Herceptin.      Interval History: Hoda is here with a professional Estonian  and feels well. She has no concerns today. She completed lymphedema therapy. Not sure how helpful it was. She continues to tolerate treatment well without any overt side effects. She continues to walk daily. She had nasal congestion with fatigue and body aches last week but is feeling better. No fevers/chills. She is eating and drinking well. No nausea or bowel changes. No rash, cough, SOB, CP. ROS otherwise negative.     Current Outpatient Prescriptions   Medication     tamoxifen (NOLVADEX) 20 MG tablet     VITAMIN D, CHOLECALCIFEROL, PO     order for DME     cephALEXin (KEFLEX) 500 MG capsule     No current facility-administered medications for this visit.        PHYSICAL EXAM:  /79 (BP Location: Right arm, Patient Position: Chair, Cuff Size: Adult Large)  Pulse 85  Temp 98.7  F (37.1  C) (Oral)  Ht 1.575 m (5' 2.01\")  Wt 79.7 kg (175 lb 12.8 oz)  SpO2 95%  BMI 32.15 kg/m2  General: Alert, oriented, pleasant, NAD  HEENT: NC/AT, no scleral icterus. MMM, no lesions or thrush  Neck: Supple, no cervical or supraclavicular adenopathy.  Axillary: No axillary adenopathy.   Lungs: CTA bilaterally, normal work of breathing  Cardiac: RRR, " S1, S2, no murmurs  Abdomen: Soft, nontender, nondistended, + BS, no palpable masses  Extremities: No pedal edema.   Skin: Left arm has some edema compared to R. No skin changes.    Labs:    1/24/2018 11:51   WBC 8.0   Hemoglobin 12.0   Hematocrit 38.3   Platelet Count 155   RBC Count 4.05   MCV 95   MCH 29.6   MCHC 31.3 (L)   RDW 13.2   Diff Method PENDING     CMP and tumor markers pending     Assessment & Plan:     1. Metastatic breast cancer, ER, HER2+ positive: Was last treated in [facility] with Herceptin. We have continued Herceptin every 3 weeks as well as daily tamoxifen. She tolerates treatment extremely well without overt symptoms. Was restaged in December with KIERA. Echocardiogram 12/2017 with EF 55-60% and normal LV function.  Continue Herceptin every 3 weeks. Continue Tamoxifen. RTC to see Dr. Aguiar in 3 weeks.     2. Bone metastasis: Continue xgeva every 6 weeks. Take calcium 1200 mg daily. She is on high dose vitamin D3 5000 IU daily per direction of Dr. Aguiar.   --Xgeva today.     3. LUE lymphedema: She completed lymphedema treatment and has a garment to use now.     Anne Lea PA-C  [facility]  [address] [place] [id number] [phone]

## 2018-01-24 NOTE — NURSING NOTE
Chief Complaint   Patient presents with     Port Draw     labs drawn via port by RN     /79 (BP Location: Right arm, Patient Position: Chair, Cuff Size: Adult Large)  Pulse 85  Temp 98.7  F (37.1  C) (Oral)  Wt 79.7 kg (175 lb 12.8 oz)  SpO2 95%  BMI 32.15 kg/m2    Vitals taken. Port accessed by RN. Labs collected and sent. Line flushed with NS & Heparin. Pt tolerated well. Pt checked in for next appointment.    Marlene Alonso RN

## 2018-01-24 NOTE — PROGRESS NOTES
Infusion Nursing Note:  Hoda Brush presents today for Cycle 7 Herceptin.    Patient seen and examined by Anne Lea in clinic prior to infusion   present during visit today: Yes, Language: Faroese.     Intravenous Access:  Implanted Port.    Treatment Conditions:  Lab Results   Component Value Date    HGB 12.0 01/24/2018     Lab Results   Component Value Date    WBC 8.0 01/24/2018      Lab Results   Component Value Date    ANEU 4.1 01/24/2018     Lab Results   Component Value Date     01/24/2018      Lab Results   Component Value Date     01/24/2018                   Lab Results   Component Value Date    POTASSIUM 3.8 01/24/2018           No results found for: MAG         Lab Results   Component Value Date    CR 0.81 01/24/2018                   Lab Results   Component Value Date    TAYLER 8.6 01/24/2018                Lab Results   Component Value Date    BILITOTAL 0.2 01/24/2018           Lab Results   Component Value Date    ALBUMIN 3.2 01/24/2018                    Lab Results   Component Value Date    ALT 21 01/24/2018           Lab Results   Component Value Date    AST 20 01/24/2018     ECHO/MUGA completed 12/12/17  EF 55-60%.    Results reviewed, labs MET treatment parameters, ok to proceed with treatment.    Xgeva injected into R upper arm without incident        Post Infusion Assessment:  Patient tolerated infusion without incident.    Discharge Plan:   Patient declined prescription refills.  AVS to patient via [employer].  Patient will return 2/131/ for cycle 8.   Face to Face time: 0.    Odalis Moreno RN

## 2018-01-25 ENCOUNTER — TELEPHONE (OUTPATIENT)
Dept: FAMILY MEDICINE | Facility: CLINIC | Age: 63
End: 2018-01-25

## 2018-01-25 ENCOUNTER — OFFICE VISIT (OUTPATIENT)
Dept: FAMILY MEDICINE | Facility: CLINIC | Age: 63
End: 2018-01-25
Payer: COMMERCIAL

## 2018-01-25 ENCOUNTER — HOSPITAL ENCOUNTER (OUTPATIENT)
Dept: GENERAL RADIOLOGY | Facility: CLINIC | Age: 63
Discharge: HOME | End: 2018-01-25
Attending: PHYSICIAN ASSISTANT | Admitting: PHYSICIAN ASSISTANT
Payer: COMMERCIAL

## 2018-01-25 VITALS
OXYGEN SATURATION: 98 % | DIASTOLIC BLOOD PRESSURE: 78 MMHG | BODY MASS INDEX: 32.18 KG/M2 | RESPIRATION RATE: 16 BRPM | WEIGHT: 176 LBS | SYSTOLIC BLOOD PRESSURE: 120 MMHG | HEART RATE: 89 BPM | TEMPERATURE: 98.3 F

## 2018-01-25 DIAGNOSIS — R05.9 COUGH: Primary | ICD-10-CM

## 2018-01-25 DIAGNOSIS — R05.9 COUGH: ICD-10-CM

## 2018-01-25 LAB
FEF 25/75: NORMAL
FEV-1: NORMAL
FEV1/FVC: NORMAL
FLUAV+FLUBV AG SPEC QL: NEGATIVE
FLUAV+FLUBV AG SPEC QL: NEGATIVE
FVC: NORMAL
SPECIMEN SOURCE: NORMAL

## 2018-01-25 PROCEDURE — 87804 INFLUENZA ASSAY W/OPTIC: CPT | Performed by: PHYSICIAN ASSISTANT

## 2018-01-25 PROCEDURE — 94640 AIRWAY INHALATION TREATMENT: CPT | Performed by: PHYSICIAN ASSISTANT

## 2018-01-25 PROCEDURE — 71046 X-RAY EXAM CHEST 2 VIEWS: CPT

## 2018-01-25 PROCEDURE — 99214 OFFICE O/P EST MOD 30 MIN: CPT | Mod: 25 | Performed by: PHYSICIAN ASSISTANT

## 2018-01-25 RX ORDER — ALBUTEROL SULFATE 90 UG/1
2 AEROSOL, METERED RESPIRATORY (INHALATION) EVERY 6 HOURS PRN
Qty: 1 INHALER | Refills: 0 | Status: SHIPPED | OUTPATIENT
Start: 2018-01-25 | End: 2018-02-21

## 2018-01-25 RX ORDER — CODEINE PHOSPHATE AND GUAIFENESIN 10; 100 MG/5ML; MG/5ML
1-2 SOLUTION ORAL EVERY 6 HOURS PRN
Qty: 240 ML | Refills: 0 | Status: SHIPPED | OUTPATIENT
Start: 2018-01-25 | End: 2018-09-13

## 2018-01-25 RX ORDER — BENZONATATE 200 MG/1
200 CAPSULE ORAL 3 TIMES DAILY PRN
Qty: 21 CAPSULE | Refills: 0 | Status: SHIPPED | OUTPATIENT
Start: 2018-01-25 | End: 2018-09-13

## 2018-01-25 NOTE — PATIENT INSTRUCTIONS
Get chest xray done  We'll contact you with results  Use robitussin with codeine at night as needed for cough  Use benzonatate pills as needed during the day  Return to clinic for any new or worsening symptoms or go to ER Urgent care in off hours

## 2018-01-25 NOTE — PROGRESS NOTES
Outpatient Physical Therapy Discharge Note     Patient: Hoda Brush  : 1955    Beginning/End Dates of Reporting Period:  2017    Referring Provider: Dr. Lisandro Aguiar    Therapy Diagnosis: Lymphedema     Client Self Report: It is a little bigger, no pain    Objective Measurements:  Objective Measure: L UE Volume  Details: 2694.82    Outcome Measures (most recent score):  Lymphedema Life Impact Scale (score range 0-72). A higher score indicates greater impairment.:  (Not administered due to language barrier)    Goals:  Goal Identifier Compression   Goal Description Pt will obtain properly fitting compression and demonstrate understand of scheduled use and care for garment to prevent worsening edema and decrease risk of infection.    Target Date 17   Date Met      Progress:     Goal Identifier Home Program   Goal Description Pt will be independent in an edema home program including exercise and self massage to better manage chronic lymphedema.   Target Date 17   Date Met      Progress:       Progress Toward Goals:   Pt did not make further appt before cancer care and then resumed edema therapy at [facility]    Plan:  Discharge from therapy.    Discharge:    Reason for Discharge: Pt continued therapy at other site with OT    Equipment Issued: none

## 2018-01-25 NOTE — NURSING NOTE
The following nebulizer treatment was given:     MEDICATION: Albuterol Sulfate 2.5 mg  : Nomac[employer]  LOT #: [id number]  EXPIRATION DATE:  04/01/19  NDC # 2510-5967-23    Eli Restrepo CMA

## 2018-01-25 NOTE — PROGRESS NOTES
SUBJECTIVE:   Hoda Brush is a 62 year old female who presents to clinic today for the following health issues:    Acute Illness   Acute illness concerns: Cough  Onset: 4 days    Fever: no    Chills/Sweats: no    Headache (location?): no    Sinus Pressure:no    Conjunctivitis:  no    Ear Pain: no    Rhinorrhea: YES    Congestion: no     Sore Throat: YES     Cough: YES-productive of green sputum    Wheeze: no    Decreased Appetite: no    Nausea: no    Vomiting: no    Diarrhea:  no    Dysuria/Freq.: no     Fatigue/Achiness: no     Sick/Strep Exposure: YES     Therapies Tried and outcome: none    Currently undergoing chemotherapy  No history of asthma          Problem list and histories reviewed & adjusted, as indicated.  Additional history: as documented    ROS:  C: NEGATIVE for fever, chills, change in weight  I: NEGATIVE for worrisome rashes, moles or lesions  E: NEGATIVE for vision changes or irritation  RESP:NEGATIVE for hemoptysis and pleurisy  CV: NEGATIVE for chest pain, palpitations or peripheral edema  GI: NEGATIVE for nausea, abdominal pain, heartburn, or change in bowel habits  : NEGATIVE for frequency, dysuria, or hematuria  M: NEGATIVE for significant arthralgias or myalgia  N: NEGATIVE for weakness, dizziness or paresthesias  E: NEGATIVE for temperature intolerance, skin/hair changes  H: NEGATIVE for bleeding problems  P: NEGATIVE for changes in mood or affect        Patient Active Problem List   Diagnosis     Malignant neoplasm of left female breast, unspecified estrogen receptor status, unspecified site of breast (H)     Bone metastasis (H)     Cellulitis of left upper extremity     Metastatic breast cancer (H)     Past Surgical History:   Procedure Laterality Date     APPENDECTOMY       INSERT PORT VASCULAR ACCESS Right 9/15/2017    Procedure: INSERT PORT VASCULAR ACCESS;  Central venous chest port placement, right;  Surgeon: Dmitriy Hernandez PA-C;  [facility]     MASTECTOMY  Left 06/27/2014    and lymph node resection       Social History   Substance Use Topics     Smoking status: Never Smoker     Smokeless tobacco: Never Used     Alcohol use No     Family History   Problem Relation Age of Onset     Breast Cancer Mother 45     Lung Cancer Father      smoker     Breast Cancer Sister 61           Labs reviewed in EPIC  BP Readings from Last 3 Encounters:   01/25/18 120/78   01/24/18 123/79   01/05/18 137/66    Wt Readings from Last 3 Encounters:   01/25/18 176 lb (79.8 kg)   01/24/18 175 lb 12.8 oz (79.7 kg)   01/05/18 177 lb 9.6 oz (80.6 kg)                    OBJECTIVE:                                                    /78  Pulse 89  Temp 98.3  F (36.8  C) (Oral)  Resp 16  Wt 176 lb (79.8 kg)  SpO2 98%  BMI 32.18 kg/m2 Body mass index is 32.18 kg/(m^2).   GENERAL:  Alert and oriented x 3. No acute distress. WD WN  HEAD:  Normocephalic.Atramautic  EYES:  PERRLA.  EOMs are full.  Sclerae are clear.  Fundi not visualized. No discharge  EARS:  Normal canal without edema exudates or discharge. TM normal. No bulging or retraction  NOSE:  Clear rhinorrhea  MOUTH/THROAT:  Oral hygene is good. Moist mucus membranes. No oral or pharyngeal lesions are seen. Oropharynx without exudates edema. With mild erythema.  NECK:  Supple.  No lymphadenopathy. ROM intact without nuchal rigidity.  LUNGS:  Upper respiratory rhonchi and mild wheezing. No rales. Chest rise symmetrical and no tenderness to palpation. Good breath sounds throughout.  HEART:  Regular rhythm.  Normal rate.  No murmur, gallop, rub or  ectopy.  PMI is not displaced.  EXTREMITIES:  Warm, well perfused with good ROM and strength. No cyanosis or edema.    SKIN:  Warm and dry.  No rashes  NEUROLOGIC:  CN 2-12 grossly intact.  No focal neurological deficits.      Results for orders placed or performed in visit on 01/25/18 (from the past 24 hour(s))   INHALATION/NEBULIZER TREATMENT, INITIAL   Result Value Ref Range    FVC       "FEV-1      FEV1/FVC      FEF 25/75     Influenza A/B antigen   Result Value Ref Range    Influenza A/B Agn Specimen Nasopharyngeal     Influenza A Negative NEG^Negative    Influenza B Negative NEG^Negative          ASSESSMENT/PLAN:                                                        ICD-10-CM    1. Cough R05 XR Chest 2 Views     Influenza A/B antigen     benzonatate (TESSALON) 200 MG capsule     guaiFENesin-codeine (ROBITUSSIN AC) 100-10 MG/5ML SOLN solution     INHALATION/NEBULIZER TREATMENT, INITIAL     albuterol (PROAIR HFA/PROVENTIL HFA/VENTOLIN HFA) 108 (90 BASE) MCG/ACT Inhaler     Likely viral. We'll keep a close eye on this. Cough medicine as prescribed. Follow up next week if feeling worse. Return to clinic for any new or worsening symptoms or go to ER Urgent care in off hours    Patient Instructions   Get chest xray done  We'll contact you with results  Use robitussin with codeine at night as needed for cough  Use benzonatate pills as needed during the day  Return to clinic for any new or worsening symptoms or go to ER Urgent care in off hours           Estimated body mass index is 32.18 kg/(m^2) as calculated from the following:    Height as of 1/24/18: 5' 2.01\" (1.575 m).    Weight as of this encounter: 176 lb (79.8 kg).   Weight management plan: See PCP    Steffi Webb  [facility]      "

## 2018-01-25 NOTE — MR AVS SNAPSHOT
After Visit Summary   1/25/2018    Hoda Brush    MRN: [id number]           Patient Information     Date Of Birth          1955        Visit Information        Provider Department      1/25/2018 1:30 PM Steffi Webb PA-C; [facility]        Today's Diagnoses     Cough    -  1      Care Instructions    Get chest xray done  We'll contact you with results  Use robitussin with codeine at night as needed for cough  Use benzonatate pills as needed during the day  Return to clinic for any new or worsening symptoms or go to ER Urgent care in off hours            Follow-ups after your visit        Your next 10 appointments already scheduled     Feb 01, 2018   Procedure with Phillip Rowe MD   [facility] [place], Endoscopy ([facility])    [address] [address]   [phone]           The [facility] is located on the corner of [address] and [address] on the [facility] [address]. It is easily accessible from virtually any point in the [place] area, via [employer] and [employer].            Feb 13, 2018  7:30 AM CST   [facility] Lab Draw with  MASONIC LAB DRAW   [facility] Lab Draw ([facility])    [address] [place] [address]  [address]  [facility] [address] [phone]            Feb 13, 2018  8:00 AM CST   (Arrive by 7:45 AM)   Return Visit with Lisandro Aguiar MD   [facility] ([facility])    [address]  [address]  [facility] [address] [phone]            Feb 13, 2018  9:00 AM CST   Infusion 60 with UC ONCOLOGY INFUSION, UC 26 ATC   [facility] ([facility])    [address] [place] [address]  [address]  [facility] [address]   [phone]            Feb 23, 2018 10:30 AM CST   Lymphedema Treatment with China BOCANEGRA  JACKIE Dent   [facility] Lymphedema OT ([facility])    [address] [place] [address]  [address]  [place] [address] [phone]              Future tests that were ordered for you today     Open Future Orders        Priority Expected Expires Ordered    XR Chest 2 Views Routine 1/25/2018 1/25/2019 1/25/2018            Who to contact     If you have questions or need follow up information about today's clinic visit or your schedule please contact [facility] directly at [phone].  Normal or non-critical lab and imaging results will be communicated to you by [employer], letter or phone within 4 business days after the clinic has received the results. If you do not hear from us within 7 days, please contact the clinic through [employer] or phone. If you have a critical or abnormal lab result, we will notify you by phone as soon as possible.  Submit refill requests through [employer] or call your pharmacy and they will forward the refill request to us. Please allow 3 business days for your refill to be completed.          Additional Information About Your Visit        [email or web address] Information     [employer] gives you secure access to your electronic health record. If you see a primary care provider, you can also send messages to your care team and make appointments. If you have questions, please call your primary care clinic.  If you do not have a primary care provider, please call [phone] and they will assist you.        Care EveryWhere ID     This is your Care EveryWhere ID. This could be used by other organizations to access your [place] medical records  [id number]        Your Vitals Were     Pulse Temperature Respirations Pulse Oximetry BMI (Body Mass Index)       89 98.3  F (36.8  C) (Oral) 16 98% 32.18 kg/m2        Blood Pressure from Last 3 Encounters:   01/25/18 120/78   01/24/18 123/79   01/05/18 137/66    Weight from Last 3 Encounters:   01/25/18 176 lb (79.8 kg)   01/24/18 175 lb 12.8 oz (79.7  kg)   01/05/18 177 lb 9.6 oz (80.6 kg)              We Performed the Following     Influenza A/B antigen          Today's Medication Changes          These changes are accurate as of 1/25/18  1:38 PM.  If you have any questions, ask your nurse or doctor.               Start taking these medicines.        Dose/Directions    benzonatate 200 MG capsule   Commonly known as:  TESSALON   Used for:  Cough   Started by:  Steffi Webb PA-C        Dose:  200 mg   Take 1 capsule (200 mg) by mouth 3 times daily as needed for cough   Quantity:  21 capsule   Refills:  0       guaiFENesin-codeine 100-10 MG/5ML Soln solution   Commonly known as:  ROBITUSSIN AC   Used for:  Cough   Started by:  Steffi Webb PA-C        Dose:  1-2 tsp.   Take 5-10 mLs by mouth every 6 hours as needed for cough   Quantity:  240 mL   Refills:  0            Where to get your medicines      These medications were sent to [facility] [address] [place] [address] [address] [place] [address] [place] [address]     Phone:  [phone]     benzonatate 200 MG capsule         Some of these will need a paper prescription and others can be bought over the counter.  Ask your nurse if you have questions.     Bring a paper prescription for each of these medications     guaiFENesin-codeine 100-10 MG/5ML Soln solution                Primary Care Provider Fax #    Physician No Ref-Primary [phone]       No address on file        Equal Access to Services     NOE VAZQUEZ : Hadii aad ku hadasho Soomaali, waaxda luqadaha, qaybta kaalmada adeegyada, waxay boyd malcolm adesarah angulo . So [facility] [phone].    ATENCIÓN: Si habla español, tiene a lagunas disposición servicios gratuitos de asistencia lingüística. Llame al [phone].    We comply with applicable federal civil rights laws and [place] laws. We do not discriminate on the basis of race, color, national origin, age, disability,  sex, sexual orientation, or gender identity.            Thank you!     Thank you for choosing [facility]  for your care. Our goal is always to provide you with excellent care. Hearing back from our patients is one way we can continue to improve our services. Please take a few minutes to complete the written survey that you may receive in the mail after your visit with us. Thank you!             Your Updated Medication List - Protect others around you: Learn how to safely use, store and throw away your medicines at www.disposemymeds.org.          This list is accurate as of 1/25/18  1:38 PM.  Always use your most recent med list.                   Brand Name Dispense Instructions for use Diagnosis    benzonatate 200 MG capsule    TESSALON    21 capsule    Take 1 capsule (200 mg) by mouth 3 times daily as needed for cough    Cough       cephALEXin 500 MG capsule    KEFLEX    56 capsule    Take 1 capsule (500 mg) by mouth every 6 hours    Cellulitis of left upper extremity       guaiFENesin-codeine 100-10 MG/5ML Soln solution    ROBITUSSIN AC    240 mL    Take 5-10 mLs by mouth every 6 hours as needed for cough    Cough       order for DME     1 each    L UE class 2 compression sleeve and gauntlet Night garment Bandaging supplies    Lymphedema of left upper extremity       tamoxifen 20 MG tablet    NOLVADEX    90 tablet    Take 1 tablet (20 mg) by mouth daily    Cancer of central portion of left breast (H)       VITAMIN D (CHOLECALCIFEROL) PO      Take 1,000 Units by mouth daily

## 2018-01-25 NOTE — TELEPHONE ENCOUNTER
Called patient with [place]  left message advising please return call to clinic to triage symptoms with nurse prior to office visit appointment - please wear a mask if coming into the clinic due to symptoms    Sent mychart requesting triage - but also acknowledging appointment is appropriate - updated provider    Bret Nunn RN

## 2018-02-01 ENCOUNTER — SURGERY (OUTPATIENT)
Age: 63
End: 2018-02-01

## 2018-02-01 ENCOUNTER — HOSPITAL ENCOUNTER (OUTPATIENT)
Facility: CLINIC | Age: 63
Discharge: HOME | End: 2018-02-01
Attending: INTERNAL MEDICINE | Admitting: INTERNAL MEDICINE
Payer: COMMERCIAL

## 2018-02-01 VITALS
OXYGEN SATURATION: 97 % | DIASTOLIC BLOOD PRESSURE: 61 MMHG | SYSTOLIC BLOOD PRESSURE: 109 MMHG | RESPIRATION RATE: 26 BRPM

## 2018-02-01 LAB — COLONOSCOPY: NORMAL

## 2018-02-01 PROCEDURE — 25000128 H RX IP 250 OP 636: Performed by: INTERNAL MEDICINE

## 2018-02-01 PROCEDURE — 99152 MOD SED SAME PHYS/QHP 5/>YRS: CPT | Performed by: INTERNAL MEDICINE

## 2018-02-01 PROCEDURE — G0500 MOD SEDAT ENDO SERVICE >5YRS: HCPCS | Performed by: INTERNAL MEDICINE

## 2018-02-01 PROCEDURE — 45378 DIAGNOSTIC COLONOSCOPY: CPT | Performed by: INTERNAL MEDICINE

## 2018-02-01 PROCEDURE — 99153 MOD SED SAME PHYS/QHP EA: CPT | Performed by: INTERNAL MEDICINE

## 2018-02-01 PROCEDURE — G0121 COLON CA SCRN NOT HI RSK IND: HCPCS | Performed by: INTERNAL MEDICINE

## 2018-02-01 RX ORDER — FENTANYL CITRATE 50 UG/ML
INJECTION, SOLUTION INTRAMUSCULAR; INTRAVENOUS PRN
Status: DISCONTINUED | OUTPATIENT
Start: 2018-02-01 | End: 2018-02-01 | Stop reason: HOSPADM

## 2018-02-01 RX ADMIN — MIDAZOLAM 1 MG: 1 INJECTION INTRAMUSCULAR; INTRAVENOUS at 09:17

## 2018-02-01 RX ADMIN — FENTANYL CITRATE 50 MCG: 50 INJECTION, SOLUTION INTRAMUSCULAR; INTRAVENOUS at 09:21

## 2018-02-01 RX ADMIN — FENTANYL CITRATE 50 MCG: 50 INJECTION, SOLUTION INTRAMUSCULAR; INTRAVENOUS at 09:13

## 2018-02-01 NOTE — IP AVS SNAPSHOT
[facility] [place], Endoscopy    [address] [address]    Phone:  [phone]                                       After Visit Summary   2/1/2018    Hoda Brush    MRN: [id number]           After Visit Summary Signature Page     I have received my discharge instructions, and my questions have been answered. I have discussed any challenges I see with this plan with the nurse or doctor.    ..........................................................................................................................................  Patient/Patient Representative Signature      ..........................................................................................................................................  Patient Representative Print Name and Relationship to Patient    ..................................................               ................................................  Date                                            Time    ..........................................................................................................................................  Reviewed by Signature/Title    ...................................................              ..............................................  Date                                                            Time

## 2018-02-01 NOTE — DISCHARGE INSTRUCTIONS
Discharge Instructions after Colonoscopy  or Sigmoidoscopy    Today you had a Colonoscopy     Activity and Diet  You were given medicine for pain. You may be dizzy or sleepy.  For 24 hours:    Do not drive or use heavy equipment.    Do not make important decisions.    Do not drink any alcohol.  You may return to your normal diet and medicines.    Discomfort    Air was placed in your colon during the exam in order to see it. Walking helps to pass the air.    You may take Tylenol (acetaminophen) for pain unless your doctor has told you not to.      Follow-up    We took small tissue samples or polyps to study. Your doctor will call you with the results  within two weeks.    When to call:    Call right away if you have:    Unusual pain in belly or chest pain not relieved with passing air.    More than 1 to 2 Tablespoons of bleeding from your rectum.    Fever above 100.6  F (37.5  C).    If you have severe pain, bleeding, or shortness of breath, go to an emergency room.    If you have questions, call:  Monday to Friday, 7 a.m. to 4:30 p.m.  Endoscopy: [phone] (We may have to call you back)    After hours  Hospital: [phone] (Ask for the GI fellow on call)

## 2018-02-01 NOTE — IP AVS SNAPSHOT
MRN:[id number]                      After Visit Summary   2/1/2018    Hoda Brush    MRN: [id number]           Thank you!     Thank you for choosing [facility] for your care. Our goal is always to provide you with excellent care. Hearing back from our patients is one way we can continue to improve our services. Please take a few minutes to complete the written survey that you may receive in the mail after you visit with us. Thank you!        Patient Information     Date Of Birth          1955        About your hospital stay     You were admitted on:  February 1, 2018 You last received care in the:  [facility], Endoscopy    You were discharged on:  February 1, 2018       Who to Call     For medical emergencies, please call 911.  For non-urgent questions about your medical care, please call your primary care provider or clinic, None  For questions related to your surgery, please call your surgery clinic        Attending Provider     Provider Phillip Craig MD Gastroenterology       Primary Care Provider Fax #    Physician No Ref-Primary [phone]      Your next 10 appointments already scheduled     Feb 13, 2018  7:30 AM CST   [facility] Lab Draw with  MASONIC LAB DRAW   [facility] Lab Draw ([facility])    [address] [place] [address]  [address]  [facility] [address]   [phone]            Feb 13, 2018  8:00 AM CST   (Arrive by 7:45 AM)   Return Visit with Lisandro Aguiar MD   [facility] ([facility])    [address] [place] [address]  [address]  [facility] [address]   [phone]            Feb 13, 2018  9:00 AM CST   Infusion 60 with UC ONCOLOGY INFUSION, UC 26 ATC   [facility] ([facility])    [address] [place] [address]  [address]  [facility] [address]   [phone]            Feb 23, 2018 10:30 AM CST   Lymphedema Treatment with  China Dent, OT   [facility] Lymphedema OT ([facility])    [address] [place] [address]  [address]  [place] [address] [phone]              Further instructions from your care team       Discharge Instructions after Colonoscopy  or Sigmoidoscopy    Today you had a Colonoscopy     Activity and Diet  You were given medicine for pain. You may be dizzy or sleepy.  For 24 hours:    Do not drive or use heavy equipment.    Do not make important decisions.    Do not drink any alcohol.  You may return to your normal diet and medicines.    Discomfort    Air was placed in your colon during the exam in order to see it. Walking helps to pass the air.    You may take Tylenol (acetaminophen) for pain unless your doctor has told you not to.      Follow-up    We took small tissue samples or polyps to study. Your doctor will call you with the results  within two weeks.    When to call:    Call right away if you have:    Unusual pain in belly or chest pain not relieved with passing air.    More than 1 to 2 Tablespoons of bleeding from your rectum.    Fever above 100.6  F (37.5  C).    If you have severe pain, bleeding, or shortness of breath, go to an emergency room.    If you have questions, call:  Monday to Friday, 7 a.m. to 4:30 p.m.  Endoscopy: [phone] (We may have to call you back)    After hours  Hospital: [phone] (Ask for the GI fellow on call)    Pending Results     No orders found from 1/30/2018 to 2/2/2018.            Admission Information     Date & Time Provider Department Dept. Phone    2/1/2018 Phillip Rowe MD [facility] [place], Endoscopy [phone]      Your Vitals Were     Blood Pressure Respirations Pulse Oximetry             108/54 14 99%         MyChart Information     [employer] gives you secure access to your electronic health record. If you see a primary care provider, you can also send messages to your care team and make appointments. If you have questions, please call your  primary care clinic.  If you do not have a primary care provider, please call [phone] and they will assist you.        Care EveryWhere ID     This is your Care EveryWhere ID. This could be used by other organizations to access your [facility] medical records  [id number]        Equal Access to Services     NOE VAZQUEZ : Luz Marina nayak Sopepeali, waaxda luqadaha, qaybta kaalmada adeegyada, alexa saldañawillisana paula beltran. So [facility] [phone].    ATENCIÓN: Si habla español, tiene a lagunas disposición servicios gratuitos de asistencia lingüística. Llame al [phone].    We comply with applicable federal civil rights laws and [place] laws. We do not discriminate on the basis of race, color, national origin, age, disability, sex, sexual orientation, or gender identity.               Review of your medicines      UNREVIEWED medicines. Ask your doctor about these medicines        Dose / Directions    albuterol 108 (90 BASE) MCG/ACT Inhaler   Commonly known as:  PROAIR HFA/PROVENTIL HFA/VENTOLIN HFA   Used for:  Cough        Dose:  2 puff   Inhale 2 puffs into the lungs every 6 hours as needed for shortness of breath / dyspnea or wheezing   Quantity:  1 Inhaler   Refills:  0       benzonatate 200 MG capsule   Commonly known as:  TESSALON   Used for:  Cough        Dose:  200 mg   Take 1 capsule (200 mg) by mouth 3 times daily as needed for cough   Quantity:  21 capsule   Refills:  0       cephALEXin 500 MG capsule   Commonly known as:  KEFLEX   Indication:  Infection of the Skin and/or Related Soft Tissue   Used for:  Cellulitis of left upper extremity        Dose:  500 mg   Take 1 capsule (500 mg) by mouth every 6 hours   Quantity:  56 capsule   Refills:  0       guaiFENesin-codeine 100-10 MG/5ML Soln solution   Commonly known as:  ROBITUSSIN AC   Used for:  Cough        Dose:  1-2 tsp.   Take 5-10 mLs by mouth every 6 hours as needed for cough   Quantity:  240 mL   Refills:  0       tamoxifen 20 MG  tablet   Commonly known as:  NOLVADEX   Used for:  Cancer of central portion of left breast (H)        Dose:  20 mg   Take 1 tablet (20 mg) by mouth daily   Quantity:  90 tablet   Refills:  3       VITAMIN D (CHOLECALCIFEROL) PO        Dose:  1000 Units   Take 1,000 Units by mouth daily   Refills:  0         CONTINUE these medicines which have NOT CHANGED        Dose / Directions    order for DME   Used for:  Lymphedema of left upper extremity        L UE class 2 compression sleeve and gauntlet Night garment Bandaging supplies   Quantity:  1 each   Refills:  1                Protect others around you: Learn how to safely use, store and throw away your medicines at www.disposemymeds.org.             Medication List: This is a list of all your medications and when to take them. Check marks below indicate your daily home schedule. Keep this list as a reference.      Medications           Morning Afternoon Evening Bedtime As Needed    albuterol 108 (90 BASE) MCG/ACT Inhaler   Commonly known as:  PROAIR HFA/PROVENTIL HFA/VENTOLIN HFA   Inhale 2 puffs into the lungs every 6 hours as needed for shortness of breath / dyspnea or wheezing                                benzonatate 200 MG capsule   Commonly known as:  TESSALON   Take 1 capsule (200 mg) by mouth 3 times daily as needed for cough                                cephALEXin 500 MG capsule   Commonly known as:  KEFLEX   Take 1 capsule (500 mg) by mouth every 6 hours                                guaiFENesin-codeine 100-10 MG/5ML Soln solution   Commonly known as:  ROBITUSSIN AC   Take 5-10 mLs by mouth every 6 hours as needed for cough                                order for DME   L UE class 2 compression sleeve and gauntlet Night garment Bandaging supplies                                tamoxifen 20 MG tablet   Commonly known as:  NOLVADEX   Take 1 tablet (20 mg) by mouth daily                                VITAMIN D (CHOLECALCIFEROL) PO   Take 1,000 Units by  mouth daily

## 2018-02-11 NOTE — PROGRESS NOTES
Hoda is seen with her daughter, Ashleigh, today for a new patient visit.  The patient is a refugee from [facility] and is here for continuation of care for her metastatic ER+HER2- breast cancer.  She is a Evangelical refugee from [facility] and was seen in clinic with her daughter.  The only data we have from [facility] is an English translation of her records.       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  On 02/11/2014, she was seen by an oncologist.  The clinical staging of T4b N2 M1 was noted.   Her breast cancer was on the left side. There was no right breast cancer, confirmed by the patient and her daughter, correcting a possible error in the translated records.  ER was positive in 100% of the cells, WV at 14% and HER2 was 3+ positive.  It appears that this histopathologic information is on the mastectomy specimen. She underwent an MRI for staging of presumptive bone metastases which was performed 03/02/2014.  There were skeletal metastases in the thoracic vertebrae at 12, L1, L3, L5 and S1 vertebral body, ranging in size from 0.7-3.0 cm in size.  Ischial and right femur were also involved, as well as a large iliac mass measuring about 15 cm in the uterus. 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   Radiation Oncology consultation was performed 03/11/2014, and she was given radiation in 1 dose of 6 Gy to the right iliac lesion.        With the initial diagnosis, she initiated treatment with 6 cycles of CAF neoadjuvant chemotherapy 02/14, 07/07, 03/28, 04/18/14, 05/08 and 05/29/14. She also received monthly zoledronic acid.  She then underwent a modified left mastectomy of Palacios type and left lymphadenoectomy on 06/27/2014.   Pathologic examination showed number at [facility] was [id number] showed infiltrative grade 2 ductal cancer with 6 level 1 metastatic lymph nodes and 3 level 2 metastatic lymph nodes.  The differentiation was  intermediate.  The tumor was ER positive 70% of the cells, ID positive in 40% of the cells and HER2 was 3+ by immunohistochemistry and the Ki-67 labeling index was 20%. Her staging after surgery was stage IV, pT4b N2 M1.  I don't see a biopsy of the skeletal metastases.  She then had continuation with chemotherapy with 4 cycles of Herceptin and taxane with monthly zoledronic acid.  Tamoxifen was initiated.  She then had two years of Herceptin and a decision was made not to continue further Herceptin.  She continued on zoledronic acid every 3 months. She was clinically stable. [place] then [place] the [place] new refugee from [facility].  She arrived last month, established [place] residency and now seeks further oncology care.     TREATMENT HISTORY:  A. Initial diagnosis with metastatic breast cancer in [facility].  Neoadjuvant CAF x 6.   B. Left mastectomy. Left axillary node dissection.  C.  Radiation in 1 dose to R iliac region. g Gy.  C. Herceptin for 2 years taxane for a prescribed course then stopped, monthly zoledronic acid.  She had 2 years of Herceptin with tamoxifen added after chemotherapy.  D.  Tamoxifen alone and zoledronic acid every 3 months.  E.  Move to [facility].  We restarted Herceptin every 3 weeks and continued tamoxifen. Bone targeted agent changed to denosumab every 6 weeks.     INTERVAL HISTORY:  Hoda returns to clinic and has been doing quite well.  She did have an upper respiratory infection but did not have influenza.  This was at the end of January.  She has been feeling generally well.  She has no pain, no fatigue, no depression and no anxiety.  She has no hot flashes with the tamoxifen and is tolerating it quite well.  She continues on Herceptin every 3 weeks.  Echocardiogram on 12/12/2017 showed an ejection fraction of 55-60%.  She had a colonoscopy performed last month which was normal and can be repeated in 10 years.  She does have a low hemoglobin of 11.6 and will perform an upper  endoscopy.  Notably, her PET/CT showed resolution of FDG uptake in her skeletal lesions when comparing August with December.       PHYSICAL EXAMINATION:   VITAL SIGNS:  Blood pressure 116/66, temperature 97.5, pulse 81, respirations 16, O2 sat 97% on room air, height 1.575 meters and weight 79.7 kg.   GENERAL:  Hoda appeared generally well.     HEENT:  She has no alopecia.  Examination of the oropharynx is without lesions.   LYMPH:  There is no palpable cervical, supraclavicular, subclavicular or axillary lymphadenopathy.   BREASTS:  Examination of the right breast showed no masses.  Examination of the left anterior chest wall showed a mastectomy incision which is well-healed without erythema or masses.  No masses in the anterior chest wall bilaterally.   LUNGS:  Clear to percussion and auscultation.   HEART:  There is a regular rate and rhythm, S1, S2.   ABDOMEN:  Soft, nontender, without hepatosplenomegaly.   EXTREMITIES:  Without edema.   PSYCHIATRIC:  Mood and affect were normal.      LABORATORY DATA:  The laboratory data showed a CMP with a calcium of 7.9 and alkaline phosphatase 35.  The CBC was remarkable only for a hemoglobin of 11.6.       IMAGING:  Chest x-ray from 01/25/2018 showed no infiltrates.       ASSESSMENT AND PLAN:     1.  Hoda Brush is a 62-year-old woman from [facility] and formerly from [facility] [place]  She lives with her daughter and is here for continuation of every 3-week Herceptin and tamoxifen.  Her tumor is ER-positive, DE-positive and HER2-positive.  She had metastatic breast cancer at the time of presentation with bone-only metastases by report.  She presented and was diagnosed with metastatic breast cancer in approximately 02/2014, when she was diagnosed with a T4 N2 M1, invasive ductal carcinoma of the left breast.  She underwent neoadjuvant CAF, left mastectomy and left axillary lymph node dissection, then radiation as noted above.  She did have radiation  to the right iliac region.  The pathology report from [facility] showed ER positive in 70% of cells, AZ positive in 40% of cells, and HER2/kenia was 3+ positive by immunohistochemistry.  Ki-67 was 20%.  She had no evidence of metastatic disease on PET/CT imaging except for multiple osseous foci of increased radiotracer uptake consistent with metastatic disease.  This imaging was in 08/2017 and was apparently unchanged compared to the 06/12/2014 exam.  There also was a stable, 6 x 4-mm, left lower lobe pulmonary nodule which was not significantly changed compared with the 06/12/2014 exam and a myomatous uterus.  In terms of imaging, followup PET/CT scan in 12/2017 showed no abnormal FDG uptake but did show the known sclerotic lesions.  At this point, I think followup imaging will be by CT of the chest, abdomen and pelvis.  Brain MRI showed no evidence of metastasis and we do not repeat this study unless there are symptoms.   2.  Breast imaging question.  No mammography.   3.  Lymphedema.  A referral has been placed.   4.  Genetic testing.  She did have genetic counseling, but I do not see the final report.  We will check on that.    5.  Continue Xgeva every 6 weeks.     6.  Followup.  Hoda will see an KARISSA in followup in the clinic in 3 weeks and me in 6 weeks with a CT scan of the chest, abdomen and pelvis the day before her visit.  Follow up with KARISSA 3-6 with Herceptin, CBC, CMP.  Upper endoscopy in next month.  Follow up with me 3-27 with CBC, CMP, CA27.29 and CEA.  CT CAP 3-26.  Echocardiogram 3-26.     Thank you for allowing us to continue to participate in Hoda Brush's care.     Genetic testing was negative and discussed with Hoda 11-03-17.  Upper endoscopy.  Negative colonoscopy and anemia.       Sincerely,    Lisandro Aguiar M.D.      Division of Hematology, Oncology, Transplant   Department of Medicine   [employer] [place] [employer]   [phone]     I spent 30 minutes  with the patient more than 50% of which was in counseling and coordination of care.

## 2018-02-13 ENCOUNTER — TELEPHONE (OUTPATIENT)
Dept: GASTROENTEROLOGY | Facility: CLINIC | Age: 63
End: 2018-02-13

## 2018-02-13 ENCOUNTER — ONCOLOGY VISIT (OUTPATIENT)
Dept: ONCOLOGY | Facility: CLINIC | Age: 63
End: 2018-02-13
Attending: INTERNAL MEDICINE
Payer: COMMERCIAL

## 2018-02-13 ENCOUNTER — APPOINTMENT (OUTPATIENT)
Dept: LAB | Facility: CLINIC | Age: 63
End: 2018-02-13
Attending: INTERNAL MEDICINE
Payer: COMMERCIAL

## 2018-02-13 VITALS
DIASTOLIC BLOOD PRESSURE: 66 MMHG | BODY MASS INDEX: 32.35 KG/M2 | SYSTOLIC BLOOD PRESSURE: 116 MMHG | HEART RATE: 81 BPM | RESPIRATION RATE: 16 BRPM | TEMPERATURE: 97.5 F | HEIGHT: 62 IN | OXYGEN SATURATION: 97 % | WEIGHT: 175.8 LBS

## 2018-02-13 DIAGNOSIS — C79.51 BONE METASTASIS: Primary | ICD-10-CM

## 2018-02-13 DIAGNOSIS — Z51.11 ENCOUNTER FOR ANTINEOPLASTIC CHEMOTHERAPY: ICD-10-CM

## 2018-02-13 DIAGNOSIS — D64.9 ANEMIA, UNSPECIFIED TYPE: ICD-10-CM

## 2018-02-13 DIAGNOSIS — C50.919 METASTATIC BREAST CANCER: ICD-10-CM

## 2018-02-13 DIAGNOSIS — C50.912 MALIGNANT NEOPLASM OF LEFT FEMALE BREAST, UNSPECIFIED ESTROGEN RECEPTOR STATUS, UNSPECIFIED SITE OF BREAST (H): ICD-10-CM

## 2018-02-13 DIAGNOSIS — C50.912 MALIGNANT NEOPLASM OF LEFT FEMALE BREAST, UNSPECIFIED ESTROGEN RECEPTOR STATUS, UNSPECIFIED SITE OF BREAST (H): Primary | ICD-10-CM

## 2018-02-13 LAB
ALBUMIN SERPL-MCNC: 3 G/DL (ref 3.4–5)
ALP SERPL-CCNC: 35 U/L (ref 40–150)
ALT SERPL W P-5'-P-CCNC: 19 U/L (ref 0–50)
ANION GAP SERPL CALCULATED.3IONS-SCNC: 5 MMOL/L (ref 3–14)
AST SERPL W P-5'-P-CCNC: 18 U/L (ref 0–45)
BASOPHILS # BLD AUTO: 0 10E9/L (ref 0–0.2)
BASOPHILS NFR BLD AUTO: 0.6 %
BILIRUB SERPL-MCNC: 0.3 MG/DL (ref 0.2–1.3)
BUN SERPL-MCNC: 14 MG/DL (ref 7–30)
CALCIUM SERPL-MCNC: 7.9 MG/DL (ref 8.5–10.1)
CHLORIDE SERPL-SCNC: 110 MMOL/L (ref 94–109)
CO2 SERPL-SCNC: 28 MMOL/L (ref 20–32)
CREAT SERPL-MCNC: 0.73 MG/DL (ref 0.52–1.04)
DIFFERENTIAL METHOD BLD: ABNORMAL
EOSINOPHIL # BLD AUTO: 0.4 10E9/L (ref 0–0.7)
EOSINOPHIL NFR BLD AUTO: 5.3 %
ERYTHROCYTE [DISTWIDTH] IN BLOOD BY AUTOMATED COUNT: 13.4 % (ref 10–15)
GFR SERPL CREATININE-BSD FRML MDRD: 80 ML/MIN/1.7M2
GLUCOSE SERPL-MCNC: 88 MG/DL (ref 70–99)
HCT VFR BLD AUTO: 36.8 % (ref 35–47)
HGB BLD-MCNC: 11.6 G/DL (ref 11.7–15.7)
IMM GRANULOCYTES # BLD: 0 10E9/L (ref 0–0.4)
IMM GRANULOCYTES NFR BLD: 0.1 %
LYMPHOCYTES # BLD AUTO: 2.9 10E9/L (ref 0.8–5.3)
LYMPHOCYTES NFR BLD AUTO: 42 %
MCH RBC QN AUTO: 30.1 PG (ref 26.5–33)
MCHC RBC AUTO-ENTMCNC: 31.5 G/DL (ref 31.5–36.5)
MCV RBC AUTO: 95 FL (ref 78–100)
MONOCYTES # BLD AUTO: 0.5 10E9/L (ref 0–1.3)
MONOCYTES NFR BLD AUTO: 7.7 %
NEUTROPHILS # BLD AUTO: 3 10E9/L (ref 1.6–8.3)
NEUTROPHILS NFR BLD AUTO: 44.3 %
NRBC # BLD AUTO: 0 10*3/UL
NRBC BLD AUTO-RTO: 0 /100
PLATELET # BLD AUTO: 201 10E9/L (ref 150–450)
POTASSIUM SERPL-SCNC: 4.1 MMOL/L (ref 3.4–5.3)
PROT SERPL-MCNC: 7.3 G/DL (ref 6.8–8.8)
RBC # BLD AUTO: 3.86 10E12/L (ref 3.8–5.2)
SODIUM SERPL-SCNC: 143 MMOL/L (ref 133–144)
WBC # BLD AUTO: 6.8 10E9/L (ref 4–11)

## 2018-02-13 PROCEDURE — 25000128 H RX IP 250 OP 636: Mod: ZF | Performed by: INTERNAL MEDICINE

## 2018-02-13 PROCEDURE — 82310 ASSAY OF CALCIUM: CPT | Performed by: INTERNAL MEDICINE

## 2018-02-13 PROCEDURE — 80053 COMPREHEN METABOLIC PANEL: CPT | Performed by: INTERNAL MEDICINE

## 2018-02-13 PROCEDURE — G0463 HOSPITAL OUTPT CLINIC VISIT: HCPCS | Mod: ZF

## 2018-02-13 PROCEDURE — 85025 COMPLETE CBC W/AUTO DIFF WBC: CPT | Performed by: INTERNAL MEDICINE

## 2018-02-13 PROCEDURE — T1013 SIGN LANG/ORAL INTERPRETER: HCPCS | Mod: U3,ZF

## 2018-02-13 PROCEDURE — 96413 CHEMO IV INFUSION 1 HR: CPT

## 2018-02-13 PROCEDURE — 99214 OFFICE O/P EST MOD 30 MIN: CPT | Mod: ZP | Performed by: INTERNAL MEDICINE

## 2018-02-13 RX ORDER — ALBUTEROL SULFATE 90 UG/1
1-2 AEROSOL, METERED RESPIRATORY (INHALATION)
Status: CANCELLED
Start: 2018-02-15

## 2018-02-13 RX ORDER — ACETAMINOPHEN 325 MG/1
650 TABLET ORAL
Status: CANCELLED | OUTPATIENT
Start: 2018-02-15

## 2018-02-13 RX ORDER — HEPARIN SODIUM (PORCINE) LOCK FLUSH IV SOLN 100 UNIT/ML 100 UNIT/ML
5 SOLUTION INTRAVENOUS EVERY 8 HOURS
Status: DISCONTINUED | OUTPATIENT
Start: 2018-02-13 | End: 2018-02-13 | Stop reason: HOSPADM

## 2018-02-13 RX ORDER — MEPERIDINE HYDROCHLORIDE 25 MG/ML
25 INJECTION INTRAMUSCULAR; INTRAVENOUS; SUBCUTANEOUS EVERY 30 MIN PRN
Status: CANCELLED | OUTPATIENT
Start: 2018-02-15

## 2018-02-13 RX ORDER — DIPHENHYDRAMINE HCL 25 MG
50 CAPSULE ORAL ONCE
Status: CANCELLED
Start: 2018-02-15

## 2018-02-13 RX ORDER — LORAZEPAM 2 MG/ML
0.5 INJECTION INTRAMUSCULAR EVERY 4 HOURS PRN
Status: CANCELLED
Start: 2018-02-15

## 2018-02-13 RX ORDER — HEPARIN SODIUM (PORCINE) LOCK FLUSH IV SOLN 100 UNIT/ML 100 UNIT/ML
5 SOLUTION INTRAVENOUS ONCE
Status: COMPLETED | OUTPATIENT
Start: 2018-02-13 | End: 2018-02-13

## 2018-02-13 RX ORDER — SODIUM CHLORIDE 9 MG/ML
1000 INJECTION, SOLUTION INTRAVENOUS CONTINUOUS PRN
Status: CANCELLED
Start: 2018-02-15

## 2018-02-13 RX ORDER — METHYLPREDNISOLONE SODIUM SUCCINATE 125 MG/2ML
125 INJECTION, POWDER, LYOPHILIZED, FOR SOLUTION INTRAMUSCULAR; INTRAVENOUS
Status: CANCELLED
Start: 2018-02-15

## 2018-02-13 RX ORDER — DIPHENHYDRAMINE HYDROCHLORIDE 50 MG/ML
50 INJECTION INTRAMUSCULAR; INTRAVENOUS
Status: CANCELLED
Start: 2018-02-15

## 2018-02-13 RX ORDER — ALBUTEROL SULFATE 0.83 MG/ML
2.5 SOLUTION RESPIRATORY (INHALATION)
Status: CANCELLED | OUTPATIENT
Start: 2018-02-15

## 2018-02-13 RX ORDER — HEPARIN SODIUM (PORCINE) LOCK FLUSH IV SOLN 100 UNIT/ML 100 UNIT/ML
5 SOLUTION INTRAVENOUS EVERY 8 HOURS
Status: CANCELLED | OUTPATIENT
Start: 2018-02-15

## 2018-02-13 RX ORDER — EPINEPHRINE 0.3 MG/.3ML
0.3 INJECTION SUBCUTANEOUS EVERY 5 MIN PRN
Status: CANCELLED | OUTPATIENT
Start: 2018-02-15

## 2018-02-13 RX ORDER — EPINEPHRINE 1 MG/ML
0.3 INJECTION, SOLUTION, CONCENTRATE INTRAVENOUS EVERY 5 MIN PRN
Status: CANCELLED | OUTPATIENT
Start: 2018-02-15

## 2018-02-13 RX ADMIN — SODIUM CHLORIDE 250 ML: 9 INJECTION, SOLUTION INTRAVENOUS at 09:06

## 2018-02-13 RX ADMIN — SODIUM CHLORIDE, PRESERVATIVE FREE 5 ML: 5 INJECTION INTRAVENOUS at 07:18

## 2018-02-13 RX ADMIN — TRASTUZUMAB 450 MG: 150 INJECTION, POWDER, LYOPHILIZED, FOR SOLUTION INTRAVENOUS at 09:33

## 2018-02-13 RX ADMIN — SODIUM CHLORIDE, PRESERVATIVE FREE 5 ML: 5 INJECTION INTRAVENOUS at 10:06

## 2018-02-13 ASSESSMENT — PAIN SCALES - GENERAL: PAINLEVEL: NO PAIN (0)

## 2018-02-13 NOTE — LETTER
2/13/2018       RE: Hoda Brush  [address] [address]     Dear Colleague,    Thank you for referring your patient, Hoda Brush, to the [facility]. Please see a copy of my visit note below.    Hoda is seen with her daughter, Ashleigh, today for a new patient visit.  The patient is a refugee from [facility] and is here for continuation of care for her metastatic ER+HER2- breast cancer.  She is a [employer] refugee from [facility] and was seen in clinic with her daughter.  The only data we have from [facility] is an English translation of her records.       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  On 02/11/2014, she was seen by an oncologist.  The clinical staging of T4b N2 M1 was noted.   Her breast cancer was on the left side. There was no right breast cancer, confirmed by the patient and her daughter, correcting a possible error in the translated records.  ER was positive in 100% of the cells, LA at 14% and HER2 was 3+ positive.  It appears that this histopathologic information is on the mastectomy specimen. She underwent an MRI for staging of presumptive bone metastases which was performed 03/02/2014.  There were skeletal metastases in the thoracic vertebrae at 12, L1, L3, L5 and S1 vertebral body, ranging in size from 0.7-3.0 cm in size.  Ischial and right femur were also involved, as well as a large iliac mass measuring about 15 cm in the uterus. 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   Radiation Oncology consultation was performed 03/11/2014, and she was given radiation in 1 dose of 6 Gy to the right iliac lesion.        With the initial diagnosis, she initiated treatment with 6 cycles of CAF neoadjuvant chemotherapy 02/14, 07/07, 03/28, 04/18/14, 05/08 and 05/29/14. She also received monthly zoledronic acid.  She then underwent a modified left mastectomy of Palacios type and left  lymphadenoectomy on 06/27/2014.   Pathologic examination showed number at [facility] was [id number] showed infiltrative grade 2 ductal cancer with 6 level 1 metastatic lymph nodes and 3 level 2 metastatic lymph nodes.  The differentiation was intermediate.  The tumor was ER positive 70% of the cells, WI positive in 40% of the cells and HER2 was 3+ by immunohistochemistry and the Ki-67 labeling index was 20%. Her staging after surgery was stage IV, pT4b N2 M1.  I don't see a biopsy of the skeletal metastases.  She then had continuation with chemotherapy with 4 cycles of Herceptin and taxane with monthly zoledronic acid.  Tamoxifen was initiated.  She then had two years of Herceptin and a decision was made not to continue further Herceptin.  She continued on zoledronic acid every 3 months. She was clinically stable. [place] then [place] new [place] from [facility].  She arrived last month, established [place] residency and now seeks further oncology care.     TREATMENT HISTORY:  A. Initial diagnosis with metastatic breast cancer in [facility].  Neoadjuvant CAF x 6.   B. Left mastectomy. Left axillary node dissection.  C.  Radiation in 1 dose to R iliac region. g Gy.  C. Herceptin for 2 years taxane for a prescribed course then stopped, monthly zoledronic acid.  She had 2 years of Herceptin with tamoxifen added after chemotherapy.  D.  Tamoxifen alone and zoledronic acid every 3 months.  [place]  [place]  We restarted Herceptin every 3 weeks and continued tamoxifen. Bone targeted agent changed to denosumab every 6 weeks.     INTERVAL HISTORY:  Hoda returns to clinic and has been doing quite well.  She did have an upper respiratory infection but did not have influenza.  This was at the end of January.  She has been feeling generally well.  She has no pain, no fatigue, no depression and no anxiety.  She has no hot flashes with the tamoxifen and is tolerating it quite well.  She continues on Herceptin  every 3 weeks.  Echocardiogram on 12/12/2017 showed an ejection fraction of 55-60%.  She had a colonoscopy performed last month which was normal and can be repeated in 10 years.  She does have a low hemoglobin of 11.6 and will perform an upper endoscopy.  Notably, her PET/CT showed resolution of FDG uptake in her skeletal lesions when comparing August with December.       PHYSICAL EXAMINATION:   VITAL SIGNS:  Blood pressure 116/66, temperature 97.5, pulse 81, respirations 16, O2 sat 97% on room air, height 1.575 meters and weight 79.7 kg.   GENERAL:  Hoda appeared generally well.     HEENT:  She has no alopecia.  Examination of the oropharynx is without lesions.   LYMPH:  There is no palpable cervical, supraclavicular, subclavicular or axillary lymphadenopathy.   BREASTS:  Examination of the right breast showed no masses.  Examination of the left anterior chest wall showed a mastectomy incision which is well-healed without erythema or masses.  No masses in the anterior chest wall bilaterally.   LUNGS:  Clear to percussion and auscultation.   HEART:  There is a regular rate and rhythm, S1, S2.   ABDOMEN:  Soft, nontender, without hepatosplenomegaly.   EXTREMITIES:  Without edema.   PSYCHIATRIC:  Mood and affect were normal.      LABORATORY DATA:  The laboratory data showed a CMP with a calcium of 7.9 and alkaline phosphatase 35.  The CBC was remarkable only for a hemoglobin of 11.6.       IMAGING:  Chest x-ray from 01/25/2018 showed no infiltrates.       ASSESSMENT AND PLAN:     1.  Hoda Brush is a 62-year-old woman from [facility] and formerly from [facility] [place]  She lives with her daughter and is here for continuation of every 3-week Herceptin and tamoxifen.  Her tumor is ER-positive, MT-positive and HER2-positive.  She had metastatic breast cancer at the time of presentation with bone-only metastases by report.  She presented and was diagnosed with metastatic breast cancer in  approximately 02/2014, when she was diagnosed with a T4 N2 M1, invasive ductal carcinoma of the left breast.  She underwent neoadjuvant CAF, left mastectomy and left axillary lymph node dissection, then radiation as noted above.  She did have radiation to the right iliac region.  The pathology report from [facility] showed ER positive in 70% of cells, NC positive in 40% of cells, and HER2/kenia was 3+ positive by immunohistochemistry.  Ki-67 was 20%.  She had no evidence of metastatic disease on PET/CT imaging except for multiple osseous foci of increased radiotracer uptake consistent with metastatic disease.  This imaging was in 08/2017 and was apparently unchanged compared to the 06/12/2014 exam.  There also was a stable, 6 x 4-mm, left lower lobe pulmonary nodule which was not significantly changed compared with the 06/12/2014 exam and a myomatous uterus.  In terms of imaging, followup PET/CT scan in 12/2017 showed no abnormal FDG uptake but did show the known sclerotic lesions.  At this point, I think followup imaging will be by CT of the chest, abdomen and pelvis.  Brain MRI showed no evidence of metastasis and we do not repeat this study unless there are symptoms.   2.  Breast imaging question.  No mammography.   3.  Lymphedema.  A referral has been placed.   4.  Genetic testing.  She did have genetic counseling, but I do not see the final report.  We will check on that.    5.  Continue Xgeva every 6 weeks.     6.  Followup.  Hoda will see an KARISSA in followup in the clinic in 3 weeks and me in 6 weeks with a CT scan of the chest, abdomen and pelvis the day before her visit.  Follow up with KARISSA 3-6 with Herceptin, CBC, CMP.  Upper endoscopy in next month.  Follow up with me 3-27 with CBC, CMP, CA27.29 and CEA.  CT CAP 3-26.  Echocardiogram 3-26.     Thank you for allowing us to continue to participate in Hoda Gonsalo's care.     Genetic testing was negative and discussed with Hoda 11-03-17.  Upper  endoscopy.  Negative colonoscopy and anemia.       Sincerely,    Lisandro Aguiar M.D.      Division of Hematology, Oncology, Transplant   Department of Medicine   [facility] [employer]   [phone]     I spent 30 minutes with the patient more than 50% of which was in counseling and coordination of care.     Again, thank you for allowing me to participate in the care of your patient.      Sincerely,    Lisandro Aguiar MD

## 2018-02-13 NOTE — NURSING NOTE
"Chief Complaint   Patient presents with     Port Draw     Labs drawn from port by RN. Line flushed with saline and heparin. Vs taken and pt checked in for appt     Port accessed with 20g 3/4\" gripper needle by RN, labs collected, line flushed with saline and heparin.  Vitals taken. Pt checked in for appointment(s).    Emily Munoz RN  "

## 2018-02-13 NOTE — NURSING NOTE
"Oncology Rooming Note    February 13, 2018 7:34 AM   Hoda Brush is a 62 year old female who presents for:    Chief Complaint   Patient presents with     Port Draw     Labs drawn from port by RN. Line flushed with saline and heparin. Vs taken and pt checked in for appt     Oncology Clinic Visit     Follow up-Breast CA     Initial Vitals: /66 (BP Location: Right arm, Patient Position: Sitting, Cuff Size: Adult Regular)  Pulse 81  Temp 97.5  F (36.4  C) (Oral)  Resp 16  Ht 1.575 m (5' 2\")  Wt 79.7 kg (175 lb 12.8 oz)  SpO2 97%  BMI 32.15 kg/m2 Estimated body mass index is 32.15 kg/(m^2) as calculated from the following:    Height as of this encounter: 1.575 m (5' 2\").    Weight as of this encounter: 79.7 kg (175 lb 12.8 oz). Body surface area is 1.87 meters squared.  No Pain (0) Comment: Data Unavailable   No LMP recorded. Patient is postmenopausal.  Allergies reviewed: Yes  Medications reviewed: Yes    Medications: Medication refills not needed today.  Pharmacy name entered into [facility]:    [id number] [place] [facility] - [place], [place] - [address] [id number] - [place], [place] - [address] [facility] [address] [place] [address] [facility]UG STORE [id number] - [place], [place] - [address] AT  [address] & [facility]    Clinical concerns: No Concerns Dr. Aguiar was notified.    10 minutes for nursing intake (face to face time)     Pia Villeda LPN              "

## 2018-02-13 NOTE — PATIENT INSTRUCTIONS
Contact Numbers  [facility] Nurse Triage: [phone]  After Hours Nurse Line:  [phone]    Please call the [facility] Nurse Triage line or after hours number if you experience a temperature greater than or equal to 100.5, shaking chills, have uncontrolled nausea, vomiting and/or diarrhea, dizziness, shortness of breath, chest pain, bleeding, unexplained bruising, or if you have any other new/concerning symptoms, questions or concerns.     If you are having any concerning symptoms or wish to speak to a provider before your next infusion visit, please call your care coordinator or triage to notify them so we can adequately serve you.     If you need a refill on a narcotic prescription or other medication, please call triage before your infusion appointment.           February 2018 Sunday Monday Tuesday Wednesday Thursday Friday Saturday                       1     [place] OUTPATIENT    7:45 AM   (180 min.)   [place] [facility]    Services Department     Admission    7:45 AM   Phillip Rowe MD   [facility]   (Discharge: 2/1/2018)     COLONOSCOPY    8:45 AM   Phillip Rowe MD   [facility] GI 2     3       4     5     6     7     8     9     10       11     12     13     [facility] DRAW    7:30 AM   (30 min.)    [facility] DRAW   [facility] Lab Draw     [facility] RETURN    7:45 AM   (60 min.)   Lisandro Aguiar MD   [facility] ONC INFUSION 60    9:00 AM   (90 min.)    ONCOLOGY INFUSION   [facility] 14     15     16     17       18  Happy Birthday!     19     20     21     22     23     LYMPHEDEMA TREATMENT   10:30 AM   (60 min.)   China Dent, OT   [facility] Lymphedema OT 24       25     26     27     28 March 2018 Sunday Monday Tuesday Wednesday Thursday Friday Saturday                       1     2     3       4     5     6      [email or web address] DRAW    8:15 AM   (15 min.)    [facility] DRAW   [facility] Lab Draw     [email or web address] RETURN    8:35 AM   (50 min.)   Jossie Sparrow PA   [facility] ONC INFUSION 60   10:00 AM   (60 min.)   [facility] ONCOLOGY INFUSION   [facility] 7     8     9     10       11     12     13     14     15     16     17       18     19     20     21     22     23     24       25     26     CT CHEST/ABDOMEN/PELVIS W   10:05 AM   (20 min.)   UCCT2   [facility] Center CT 27     [email or web address] DRAW    8:00 AM   (15 min.)   [facility] DRAW   [facility] Lab Draw     [email or web address] RETURN    8:15 AM   (30 min.)   Lisandro Aguiar MD   [facility] ONC INFUSION 60    9:30 AM   (60 min.)    ONCOLOGY INFUSION   [facility] 28     29     30     31                  Lab Results:  Recent Results (from the past 12 hour(s))   Comprehensive metabolic panel    Collection Time: 02/13/18  7:23 AM   Result Value Ref Range    Sodium 143 133 - 144 mmol/L    Potassium 4.1 3.4 - 5.3 mmol/L    Chloride 110 (H) 94 - 109 mmol/L    Carbon Dioxide 28 20 - 32 mmol/L    Anion Gap 5 3 - 14 mmol/L    Glucose 88 70 - 99 mg/dL    Urea Nitrogen 14 7 - 30 mg/dL    Creatinine 0.73 0.52 - 1.04 mg/dL    GFR Estimate 80 >60 mL/min/1.7m2    GFR Estimate If Black >90 >60 mL/min/1.7m2    Calcium 7.9 (L) 8.5 - 10.1 mg/dL    Bilirubin Total 0.3 0.2 - 1.3 mg/dL    Albumin 3.0 (L) 3.4 - 5.0 g/dL    Protein Total 7.3 6.8 - 8.8 g/dL    Alkaline Phosphatase 35 (L) 40 - 150 U/L    ALT 19 0 - 50 U/L    AST 18 0 - 45 U/L   CBC with platelets differential    Collection Time: 02/13/18  7:23 AM   Result Value Ref Range    WBC 6.8 4.0 - 11.0 10e9/L    RBC Count 3.86 3.8 - 5.2 10e12/L    Hemoglobin 11.6 (L) 11.7 - 15.7 g/dL    Hematocrit 36.8 35.0 - 47.0 %    MCV 95 78 - 100 fl    MCH 30.1 26.5 - 33.0 pg    MCHC 31.5 31.5 - 36.5 g/dL    RDW 13.4 10.0 - 15.0 %    Platelet  Count 201 150 - 450 10e9/L    Diff Method Automated Method     % Neutrophils 44.3 %    % Lymphocytes 42.0 %    % Monocytes 7.7 %    % Eosinophils 5.3 %    % Basophils 0.6 %    % Immature Granulocytes 0.1 %    Nucleated RBCs 0 0 /100    Absolute Neutrophil 3.0 1.6 - 8.3 10e9/L    Absolute Lymphocytes 2.9 0.8 - 5.3 10e9/L    Absolute Monocytes 0.5 0.0 - 1.3 10e9/L    Absolute Eosinophils 0.4 0.0 - 0.7 10e9/L    Absolute Basophils 0.0 0.0 - 0.2 10e9/L    Abs Immature Granulocytes 0.0 0 - 0.4 10e9/L    Absolute Nucleated RBC 0.0

## 2018-02-13 NOTE — MR AVS SNAPSHOT
After Visit Summary   2/13/2018    Hoda Brush    MRN: [id number]           Patient Information     Date Of Birth          1955        Visit Information        Provider Department      2/13/2018 8:00 AM Wesley Iraheta; Lisandro Aguiar MD [facility]        Today's Diagnoses     Bone metastasis (H)    -  1    Metastatic breast cancer (H)        Anemia, unspecified type        Malignant neoplasm of left female breast, unspecified estrogen receptor status, unspecified site of breast (H)        Encounter for antineoplastic chemotherapy           Follow-ups after your visit        Your next 10 appointments already scheduled     Feb 23, 2018 10:30 AM CST   Lymphedema Treatment with China Dent OT   [facility] Lymphedema OT ([facility])    [address]  [address]  [place] [address]   [phone]            Mar 06, 2018  8:15 AM CST   [facility] Lab Draw with  [facility] LAB DRAW   [facility] ([facility])    [address]  [address]  [place] [address]   [phone]            Mar 06, 2018  8:50 AM CST   (Arrive by 8:35 AM)   Return Visit with SEMAJ Eddy   [facility] ([facility])    [address]  [address]  [place] [address]   [phone]            Mar 06, 2018 10:00 AM CST   Infusion 60 with UC ONCOLOGY INFUSION, UC 20 ATC   [facility] ([facility])    [address]  [address]  [place] [address]   [phone]            Mar 26, 2018  9:00 AM CDT   Ech Complete with UCECHCR2   [facility] ([facility])    [address]  3rd Floor  [place] [address] [phone]           1. Please bring or wear a comfortable two-piece outfit. 2. You may eat, drink and take your normal medicines. 3. For any questions that cannot be  answered, please contact the ordering physician            Mar 26, 2018 10:20 AM CDT   (Arrive by 10:05 AM)   CT CHEST/ABDOMEN/PELVIS W CONTRAST with UCCT2   [facility] ([facility])    [address]  1st [address]  [facility] [id number] [phone]           Please bring any scans or X-rays taken at other hospitals, if similar tests were done. Also bring a list of your medicines, including vitamins, minerals and over-the-counter drugs. It is safest to leave personal items at home.  Be sure to tell your doctor:   If you have any allergies.   If there s any chance you are pregnant.   If you are breastfeeding.  You may have contrast for this exam. To prepare:   Do not eat or drink for 2 hours before your exam. If you need to take medicine, you may take it with small sips of water. (We may ask you to take liquid medicine as well.)   The day before your exam, drink extra fluids at least six 8-ounce glasses (unless your doctor tells you to restrict your fluids).   You will be given instructions on how to drink the contrast.  Patients over 70 or patients with diabetes or kidney problems:   If you haven t had a blood test (creatinine test) within the last 30 days, the Cardiologist/Radiologist may require you to get this test prior to your exam.  If you have diabetes:   Continue to take your metformin medication on the day of your exam  Please wear loose clothing, such as a sweat suit or jogging clothes. Avoid snaps, zippers and other metal. We may ask you to undress and put on a hospital gown.  If you have any questions, please call the Imaging Department where you will have your exam.            Mar 27, 2018  8:00 AM CDT   [facility] Lab Draw with  MASONIC LAB DRAW   [facility] Draw ([facility])    [address]  [address]  [place] [facility] [id number] [phone]            Mar 27, 2018  8:30 AM CDT   (Arrive by 8:15 AM)   Return  "Visit with Lisandro Aguiar MD   [facility] ([facility])    [address]  [address]  [place] [address] [phone]            Mar 27, 2018  9:30 AM CDT   Infusion 60 with UC ONCOLOGY INFUSION   [facility] ([facility])    [address]  [address]  [place] [address] [phone]              Who to contact     If you have questions or need follow up information about today's clinic visit or your schedule please contact [facility] directly at [phone].  Normal or non-critical lab and imaging results will be communicated to you by MyChart, letter or phone within 4 business days after the clinic has received the results. If you do not hear from us within 7 days, please contact the clinic through [employer] or phone. If you have a critical or abnormal lab result, we will notify you by phone as soon as possible.  Submit refill requests through [employer] or call your pharmacy and they will forward the refill request to us. Please allow 3 business days for your refill to be completed.          Additional Information About Your Visit        [employer] Information     [employer] gives you secure access to your electronic health record. If you see a primary care provider, you can also send messages to your care team and make appointments. If you have questions, please call your primary care clinic.  If you do not have a primary care provider, please call [phone] and they will assist you.        Care EveryWhere ID     This is your Care EveryWhere ID. This could be used by other organizations to access your [facility] medical records  [id number]        Your Vitals Were     Pulse Temperature Respirations Height Pulse Oximetry BMI (Body Mass Index)    81 97.5  F (36.4  C) (Oral) 16 1.575 m (5' 2\") 97% 32.15 kg/m2       Blood Pressure from Last 3 Encounters:   02/16/18 120/66 "   02/13/18 116/66   02/01/18 109/61    Weight from Last 3 Encounters:   02/13/18 79.7 kg (175 lb 12.8 oz)   01/25/18 79.8 kg (176 lb)   01/24/18 79.7 kg (175 lb 12.8 oz)               Primary Care Provider Fax #    Physician No Ref-Primary [phone]       No address on file        Equal Access to Services     NOE VAZQUEZ : Hadii aad ku hadasho Soomaali, waaxda luqadaha, qaybta kaalmada adeegyada, waxay idiin jesusn adesarah reeder laquocnba . So [facility] [phone].    ATENCIÓN: Si habla esposorio, tiene a lagunas disposición servicios gratuitos de asistencia lingüística. Llame al [phone].    We comply with applicable federal civil rights laws and [place] laws. We do not discriminate on the basis of race, color, national origin, age, disability, sex, sexual orientation, or gender identity.            Thank you!     Thank you for choosing [facility]  for your care. Our goal is always to provide you with excellent care. Hearing back from our patients is one way we can continue to improve our services. Please take a few minutes to complete the written survey that you may receive in the mail after your visit with us. Thank you!             Your Updated Medication List - Protect others around you: Learn how to safely use, store and throw away your medicines at www.disposemymeds.org.          This list is accurate as of 2/13/18 11:59 PM.  Always use your most recent med list.                   Brand Name Dispense Instructions for use Diagnosis    albuterol 108 (90 BASE) MCG/ACT Inhaler    PROAIR HFA/PROVENTIL HFA/VENTOLIN HFA    1 Inhaler    Inhale 2 puffs into the lungs every 6 hours as needed for shortness of breath / dyspnea or wheezing    Cough       benzonatate 200 MG capsule    TESSALON    21 capsule    Take 1 capsule (200 mg) by mouth 3 times daily as needed for cough    Cough       cephALEXin 500 MG capsule    KEFLEX    56 capsule    Take 1 capsule (500 mg) by mouth every 6 hours    Cellulitis of  left upper extremity       guaiFENesin-codeine 100-10 MG/5ML Soln solution    ROBITUSSIN AC    240 mL    Take 5-10 mLs by mouth every 6 hours as needed for cough    Cough       order for DME     1 each    L UE class 2 compression sleeve and gauntlet Night garment Bandaging supplies    Lymphedema of left upper extremity       tamoxifen 20 MG tablet    NOLVADEX    90 tablet    Take 1 tablet (20 mg) by mouth daily    Cancer of central portion of left breast (H)       VITAMIN D (CHOLECALCIFEROL) PO      Take 1,000 Units by mouth daily

## 2018-02-13 NOTE — TELEPHONE ENCOUNTER
Patient scheduled for EGD    Indication for procedure. Low hgb    Referring Provider. Dr. Aguiar    ? no    Arrival time verified? 8:45    Facility location verified? [address], [address]    Instructions given regarding prep and procedure    Prep Type NPO for 6 hours prior to procedure    Are you taking any anticoagulants or blood thinners? no    Instructions given? Yes,     Electronic implanted devices? no    Pre procedure teaching completed? Yes    Transportation from procedure? Yes,     H&P / Pre op physical completed? Na    Azeb Macdonald RN

## 2018-02-13 NOTE — PROGRESS NOTES
Infusion Nursing Note:  Hoda Brush presents today for Cycle 8 Day 1 Herceptin.    Patient seen by provider today: Yes: Dr. Aguiar   present during visit today: Yes, Language: Indonesian.     Intravenous Access:  Implanted Port.    Treatment Conditions:  Lab Results   Component Value Date    HGB 11.6 02/13/2018     Lab Results   Component Value Date    WBC 6.8 02/13/2018      Lab Results   Component Value Date    ANEU 3.0 02/13/2018     Lab Results   Component Value Date     02/13/2018      Lab Results   Component Value Date     02/13/2018                   Lab Results   Component Value Date    POTASSIUM 4.1 02/13/2018           No results found for: MAG         Lab Results   Component Value Date    CR 0.73 02/13/2018                   Lab Results   Component Value Date    TAYLER 7.9 02/13/2018                Lab Results   Component Value Date    BILITOTAL 0.3 02/13/2018           Lab Results   Component Value Date    ALBUMIN 3.0 02/13/2018                    Lab Results   Component Value Date    ALT 19 02/13/2018           Lab Results   Component Value Date    AST 18 02/13/2018     Results reviewed, labs MET treatment parameters, ok to proceed with treatment.  ECHO/MUGA completed 12/12/2017 EF 55-60%.    Post Infusion Assessment:  Patient tolerated infusion without incident.  Blood return noted pre and post infusion.  Site patent and intact, free from redness, edema or discomfort.  No evidence of extravasations.  Access discontinued per protocol.    Discharge Plan:   Patient declined prescription refills.  Discharge instructions reviewed with: Patient.  Patient and/or family verbalized understanding of discharge instructions and all questions answered.  Copy of AVS reviewed with patient and/or family.  Patient will return 03/06/2018 for next lab, provider, and chemotherapy appointment.  Patient discharged in stable condition accompanied by: self and .  Departure Mode:  Ambulatory.    Jemma Duong RN

## 2018-02-13 NOTE — MR AVS SNAPSHOT
After Visit Summary   2/13/2018    Hoda Brush    MRN: [id number]           Patient Information     Date Of Birth          1955        Visit Information        Provider Department      2/13/2018 9:00 AM Wesley Iraheta;  27 ATC;  ONCOLOGY INFUSION  Services Department        Today's Diagnoses     Malignant neoplasm of left female breast, unspecified estrogen receptor status, unspecified site of breast (H)    -  1      Care Instructions    Contact Numbers  [facility] Nurse Triage: [phone]  After Hours Nurse Line:  [phone]    Please call the [facility] Nurse Triage line or after hours number if you experience a temperature greater than or equal to 100.5, shaking chills, have uncontrolled nausea, vomiting and/or diarrhea, dizziness, shortness of breath, chest pain, bleeding, unexplained bruising, or if you have any other new/concerning symptoms, questions or concerns.     If you are having any concerning symptoms or wish to speak to a provider before your next infusion visit, please call your care coordinator or triage to notify them so we can adequately serve you.     If you need a refill on a narcotic prescription or other medication, please call triage before your infusion appointment.           February 2018 Sunday Monday Tuesday Wednesday Thursday Friday Saturday                       1     [place] [facility]    7:45 AM   (180 min.)   [place] [facility]    Services Department     Admission    7:45 AM   Phillip Rowe MD   [facility] [place], Endoscopy   (Discharge: 2/1/2018)     COLONOSCOPY    8:45 AM   Phillip Rowe MD   [facility] GI 2     3       4     5     6     7     8     9     10       11     12     13     [facility] MASONIC LAB DRAW    7:30 AM   (30 min.)    MASONIC LAB DRAW   [facility] Masonic Lab Draw     [facility] RETURN    7:45 AM   (60 min.)   Lisandro Aguiar MD   [facility]      P ONC INFUSION 60    9:00 AM   (90 min.)   UC ONCOLOGY INFUSION   [facility] 14     15     16     17       18  Happy Birthday!     19     20     21     22     23     LYMPHEDEMA TREATMENT   10:30 AM   (60 min.)   China Dent OT   [facility] Lymphedema OT 24       25     26     27     28 March 2018 Sunday Monday Tuesday Wednesday Thursday Friday Saturday                       1     2     3       4     5     6     [facility] MASONIC LAB DRAW    8:15 AM   (15 min.)    [facility]   [facility]     [facility] RETURN    8:35 AM   (50 min.)   Jossie Sparrow PA   [facility]     [facility] ONC INFUSION 60   10:00 AM   (60 min.)   UC ONCOLOGY INFUSION   [facility] 7     8     9     10       11     12     13     14     15     16     17       18     19     20     21     22     23     24       25     26     CT CHEST/ABDOMEN/PELVIS W   10:05 AM   (20 min.)   UCCT2   [facility] CT 27     [facility] MASONIC LAB DRAW    8:00 AM   (15 min.)    [facility] LAB DRAW   [facility]     [facility] RETURN    8:15 AM   (30 min.)   Lisandro Aguiar MD   [facility]     [facility] ONC INFUSION 60    9:30 AM   (60 min.)   UC ONCOLOGY INFUSION   [facility] 28     29     30     31                  Lab Results:  Recent Results (from the past 12 hour(s))   Comprehensive metabolic panel    Collection Time: 02/13/18  7:23 AM   Result Value Ref Range    Sodium 143 133 - 144 mmol/L    Potassium 4.1 3.4 - 5.3 mmol/L    Chloride 110 (H) 94 - 109 mmol/L    Carbon Dioxide 28 20 - 32 mmol/L    Anion Gap 5 3 - 14 mmol/L    Glucose 88 70 - 99 mg/dL    Urea Nitrogen 14 7 - 30 mg/dL    Creatinine 0.73 0.52 - 1.04 mg/dL    GFR Estimate 80 >60 mL/min/1.7m2    GFR Estimate If Black >90 >60 mL/min/1.7m2    Calcium 7.9 (L) 8.5 - 10.1 mg/dL    Bilirubin Total 0.3 0.2 - 1.3 mg/dL    Albumin 3.0 (L) 3.4 -  5.0 g/dL    Protein Total 7.3 6.8 - 8.8 g/dL    Alkaline Phosphatase 35 (L) 40 - 150 U/L    ALT 19 0 - 50 U/L    AST 18 0 - 45 U/L   CBC with platelets differential    Collection Time: 02/13/18  7:23 AM   Result Value Ref Range    WBC 6.8 4.0 - 11.0 10e9/L    RBC Count 3.86 3.8 - 5.2 10e12/L    Hemoglobin 11.6 (L) 11.7 - 15.7 g/dL    Hematocrit 36.8 35.0 - 47.0 %    MCV 95 78 - 100 fl    MCH 30.1 26.5 - 33.0 pg    MCHC 31.5 31.5 - 36.5 g/dL    RDW 13.4 10.0 - 15.0 %    Platelet Count 201 150 - 450 10e9/L    Diff Method Automated Method     % Neutrophils 44.3 %    % Lymphocytes 42.0 %    % Monocytes 7.7 %    % Eosinophils 5.3 %    % Basophils 0.6 %    % Immature Granulocytes 0.1 %    Nucleated RBCs 0 0 /100    Absolute Neutrophil 3.0 1.6 - 8.3 10e9/L    Absolute Lymphocytes 2.9 0.8 - 5.3 10e9/L    Absolute Monocytes 0.5 0.0 - 1.3 10e9/L    Absolute Eosinophils 0.4 0.0 - 0.7 10e9/L    Absolute Basophils 0.0 0.0 - 0.2 10e9/L    Abs Immature Granulocytes 0.0 0 - 0.4 10e9/L    Absolute Nucleated RBC 0.0                Follow-ups after your visit        Your next 10 appointments already scheduled     Feb 23, 2018 10:30 AM CST   Lymphedema Treatment with China Dent OT   [facility] Lymphedema OT ([facility])    [address]  [address]  [facility] [id number] [phone]            Mar 06, 2018  8:15 AM CST   Masonic Lab Draw with  MASONIC LAB DRAW   [facility]    [address]  [address]  [facility] [id number] [phone]            Mar 06, 2018  8:50 AM CST   (Arrive by 8:35 AM)   Return Visit with SEMAJ Eddy   [facility] ([facility])    [address] [place] [address]  [address]  [facility] [address]   [phone]            Mar 06, 2018 10:00 AM CST   Infusion 61 with UC ONCOLOGY INFUSION, UC 20 ATC   [facility] ([facility])    [facility]  [address]  [address]  [facility] [place] [address]   [phone]            Mar 26, 2018 10:20 AM CDT   (Arrive by 10:05 AM)   CT CHEST/ABDOMEN/PELVIS W CONTRAST with UCCT2   [facility] [place] [facility] ([facility])    [address]  1st Floor  [facility] [place] [address]   [phone]           Please bring any scans or X-rays taken at other hospitals, if similar tests were done. Also bring a list of your medicines, including vitamins, minerals and over-the-counter drugs. It is safest to leave personal items at home.  Be sure to tell your doctor:   If you have any allergies.   If there s any chance you are pregnant.   If you are breastfeeding.  You may have contrast for this exam. To prepare:   Do not eat or drink for 2 hours before your exam. If you need to take medicine, you may take it with small sips of water. (We may ask you to take liquid medicine as well.)   The day before your exam, drink extra fluids at least six 8-ounce glasses (unless your doctor tells you to restrict your fluids).   You will be given instructions on how to drink the contrast.  Patients over 70 or patients with diabetes or kidney problems:   If you haven t had a blood test (creatinine test) within the last 30 days, the Cardiologist/Radiologist may require you to get this test prior to your exam.  If you have diabetes:   Continue to take your metformin medication on the day of your exam  Please wear loose clothing, such as a sweat suit or jogging clothes. Avoid snaps, zippers and other metal. We may ask you to undress and put on a hospital gown.  If you have any questions, please call the Imaging Department where you will have your exam.            Mar 27, 2018  8:00 AM CDT   Masonic Lab Draw with  MASONIC LAB DRAW   [facility] Masonic Lab Draw ([facility])    [address]  [address]  [facility] [place] [address]   [phone]            Mar 27, 2018  8:30 AM CDT   (Arrive  by 8:15 AM)   Return Visit with Lisandro Aguiar MD   [facility] ([facility])    [address]  [address]  [facility] [id number] [phone]            Mar 27, 2018  9:30 AM CDT   Infusion 60 with UC ONCOLOGY INFUSION, UC 22 ATC   [facility] ([facility])    [address]  [address]  [facility] [id number] [phone]              Future tests that were ordered for you today     Open Standing Orders        Priority Remaining Interval Expires Ordered    Ca27.29  breast tumor marker Routine 52/52 5/14/2018 2/13/2018    CBC with platelets differential Routine 52/52 2/13/2019 2/13/2018    CEA Routine 52/52 5/14/2018 2/13/2018    Comprehensive metabolic panel Routine 52/52 2/13/2019 2/13/2018          Open Future Orders        Priority Expected Expires Ordered    UPPER GI ENDOSCOPY Routine  3/30/2018 2/13/2018    CT Chest/Abdomen/Pelvis w Contrast Routine  9/11/2018 2/13/2018            Who to contact     If you have questions or need follow up information about today's clinic visit or your schedule please contact [facility] directly at [phone].  Normal or non-critical lab and imaging results will be communicated to you by Webcrumbzhart, letter or phone within 4 business days after the clinic has received the results. If you do not hear from us within 7 days, please contact the clinic through Webcrumbzhart or phone. If you have a critical or abnormal lab result, we will notify you by phone as soon as possible.  Submit refill requests through Habbo or call your pharmacy and they will forward the refill request to us. Please allow 3 business days for your refill to be completed.          Additional Information About Your Visit        Habbo Information     H[employer]bo gives you secure access to your electronic health record. If you see a primary care provider, you can also send messages  to your care team and make appointments. If you have questions, please call your primary care clinic.  If you do not have a primary care provider, please call [phone] and they will assist you.        Care EveryWhere ID     This is your Care EveryWhere ID. This could be used by other organizations to access your [place] medical records  [id number]         Blood Pressure from Last 3 Encounters:   02/13/18 116/66   02/01/18 109/61   01/25/18 120/78    Weight from Last 3 Encounters:   02/13/18 79.7 kg (175 lb 12.8 oz)   01/25/18 79.8 kg (176 lb)   01/24/18 79.7 kg (175 lb 12.8 oz)              We Performed the Following     CBC with platelets differential     Comprehensive metabolic panel        Primary Care Provider Fax #    Physician No Ref-Primary [phone]       No address on file        Equal Access to Services     [facility] : Luz Marina Ramsey, ro jefferson, jamal wilkins, alexa angulo . So [facility] [phone].    ATENCIÓN: Si habla español, tiene a lagunas disposición servicios gratuitos de asistencia lingüística. Llame al [phone].    We comply with applicable federal civil rights laws and [place] laws. We do not discriminate on the basis of race, color, national origin, age, disability, sex, sexual orientation, or gender identity.            Thank you!     Thank you for choosing [facility]  for your care. Our goal is always to provide you with excellent care. Hearing back from our patients is one way we can continue to improve our services. Please take a few minutes to complete the written survey that you may receive in the mail after your visit with us. Thank you!             Your Updated Medication List - Protect others around you: Learn how to safely use, store and throw away your medicines at www.disposemymeds.org.          This list is accurate as of 2/13/18  9:29 AM.  Always use your most recent med list.                    Brand Name Dispense Instructions for use Diagnosis    albuterol 108 (90 BASE) MCG/ACT Inhaler    PROAIR HFA/PROVENTIL HFA/VENTOLIN HFA    1 Inhaler    Inhale 2 puffs into the lungs every 6 hours as needed for shortness of breath / dyspnea or wheezing    Cough       benzonatate 200 MG capsule    TESSALON    21 capsule    Take 1 capsule (200 mg) by mouth 3 times daily as needed for cough    Cough       cephALEXin 500 MG capsule    KEFLEX    56 capsule    Take 1 capsule (500 mg) by mouth every 6 hours    Cellulitis of left upper extremity       guaiFENesin-codeine 100-10 MG/5ML Soln solution    ROBITUSSIN AC    240 mL    Take 5-10 mLs by mouth every 6 hours as needed for cough    Cough       order for DME     1 each    L UE class 2 compression sleeve and gauntlet Night garment Bandaging supplies    Lymphedema of left upper extremity       tamoxifen 20 MG tablet    NOLVADEX    90 tablet    Take 1 tablet (20 mg) by mouth daily    Cancer of central portion of left breast (H)       VITAMIN D (CHOLECALCIFEROL) PO      Take 1,000 Units by mouth daily

## 2018-02-16 ENCOUNTER — SURGERY (OUTPATIENT)
Age: 63
End: 2018-02-16

## 2018-02-16 ENCOUNTER — HOSPITAL ENCOUNTER (OUTPATIENT)
Facility: CLINIC | Age: 63
Discharge: HOME | End: 2018-02-16
Attending: INTERNAL MEDICINE | Admitting: INTERNAL MEDICINE
Payer: COMMERCIAL

## 2018-02-16 VITALS
OXYGEN SATURATION: 93 % | RESPIRATION RATE: 18 BRPM | SYSTOLIC BLOOD PRESSURE: 120 MMHG | DIASTOLIC BLOOD PRESSURE: 66 MMHG

## 2018-02-16 LAB — UPPER GI ENDOSCOPY: NORMAL

## 2018-02-16 PROCEDURE — 88305 TISSUE EXAM BY PATHOLOGIST: CPT | Performed by: INTERNAL MEDICINE

## 2018-02-16 PROCEDURE — 25000125 ZZHC RX 250: Performed by: INTERNAL MEDICINE

## 2018-02-16 PROCEDURE — 43239 EGD BIOPSY SINGLE/MULTIPLE: CPT | Performed by: INTERNAL MEDICINE

## 2018-02-16 PROCEDURE — 99152 MOD SED SAME PHYS/QHP 5/>YRS: CPT | Performed by: INTERNAL MEDICINE

## 2018-02-16 PROCEDURE — 25000128 H RX IP 250 OP 636: Performed by: INTERNAL MEDICINE

## 2018-02-16 PROCEDURE — G0500 MOD SEDAT ENDO SERVICE >5YRS: HCPCS | Performed by: INTERNAL MEDICINE

## 2018-02-16 RX ORDER — FENTANYL CITRATE 50 UG/ML
INJECTION, SOLUTION INTRAMUSCULAR; INTRAVENOUS PRN
Status: DISCONTINUED | OUTPATIENT
Start: 2018-02-16 | End: 2018-02-16 | Stop reason: HOSPADM

## 2018-02-16 RX ADMIN — MIDAZOLAM 1 MG: 1 INJECTION INTRAMUSCULAR; INTRAVENOUS at 10:25

## 2018-02-16 RX ADMIN — FENTANYL CITRATE 50 MCG: 50 INJECTION, SOLUTION INTRAMUSCULAR; INTRAVENOUS at 10:29

## 2018-02-16 RX ADMIN — FENTANYL CITRATE 50 MCG: 50 INJECTION, SOLUTION INTRAMUSCULAR; INTRAVENOUS at 10:26

## 2018-02-16 RX ADMIN — BENZOCAINE 1 SPRAY: 220 SPRAY, METERED PERIODONTAL at 10:27

## 2018-02-16 NOTE — IP AVS SNAPSHOT
MRN:[id number]                      After Visit Summary   2/16/2018    Hoda Brush    MRN: [id number]           Thank you!     Thank you for choosing [facility] for your care. Our goal is always to provide you with excellent care. Hearing back from our patients is one way we can continue to improve our services. Please take a few minutes to complete the written survey that you may receive in the mail after you visit with us. Thank you!        Patient Information     Date Of Birth          1955        About your hospital stay     You were admitted on:  February 16, 2018 You last received care in the:  [facility], Endoscopy    You were discharged on:  February 16, 2018       Who to Call     For medical emergencies, please call 911.  For non-urgent questions about your medical care, please call your primary care provider or clinic, None  For questions related to your surgery, please call your surgery clinic        Attending Provider     Provider Specialty    Paty Herman MD Hepatology       Primary Care Provider Fax #    Physician No Ref-Primary [phone]      Your next 10 appointments already scheduled     Feb 23, 2018 10:30 AM CST   Lymphedema Treatment with China Dent OT   [facility] Lymphedema OT ([facility])    [address]  [address]  [facility] [address]   [phone]            Mar 06, 2018  8:15 AM CST   [facility] Lab Draw with UC MASONIC LAB DRAW   [facility] Lab Draw ([facility])    [address]  [address]  [facility] [address] [phone]            Mar 06, 2018  8:50 AM CST   (Arrive by 8:35 AM)   Return Visit with SEMAJ Eddy   [facility] ([facility] [place])    [address]  [address]  [facility] [address]   [phone]            Mar 06, 2018 10:00 AM CST   Infusion 60 with RASHAAD ONCOLOGY INFUSION, UC 20 ATC   [facility] Cancer  Clinic ([facility])    [address]  [address]  [facility] [address] [phone]            Mar 26, 2018  9:00 AM CDT   Ech Complete with UCECHCR2   [facility] Echo ([facility])    [address]  3rd Floor  [facility] [id number] [phone]           1. Please bring or wear a comfortable two-piece outfit. 2. You may eat, drink and take your normal medicines. 3. For any questions that cannot be answered, please contact the ordering physician            Mar 26, 2018 10:20 AM CDT   (Arrive by 10:05 AM)   CT CHEST/ABDOMEN/PELVIS W CONTRAST with UCCT2   [facility] CT ([facility])    [address]  1st Floor  [facility] [address] [phone]           Please bring any scans or X-rays taken at other hospitals, if similar tests were done. Also bring a list of your medicines, including vitamins, minerals and over-the-counter drugs. It is safest to leave personal items at home.  Be sure to tell your doctor:   If you have any allergies.   If there s any chance you are pregnant.   If you are breastfeeding.  You may have contrast for this exam. To prepare:   Do not eat or drink for 2 hours before your exam. If you need to take medicine, you may take it with small sips of water. (We may ask you to take liquid medicine as well.)   The day before your exam, drink extra fluids at least six 8-ounce glasses (unless your doctor tells you to restrict your fluids).   You will be given instructions on how to drink the contrast.  Patients over 70 or patients with diabetes or kidney problems:   If you haven t had a blood test (creatinine test) within the last 30 days, the Cardiologist/Radiologist may require you to get this test prior to your exam.  If you have diabetes:   Continue to take your metformin medication on the day of your exam  Please wear loose clothing, such as a sweat suit or jogging clothes.  Avoid snaps, zippers and other metal. We may ask you to undress and put on a hospital gown.  If you have any questions, please call the Imaging Department where you will have your exam.            Mar 27, 2018  8:00 AM CDT   [facility] Lab Draw with  MASONIC LAB DRAW   [facility] Lab Draw ([facility])    [address] [place] [address] [address]  [address]  [facility] [address]   [phone]            Mar 27, 2018  8:30 AM CDT   (Arrive by 8:15 AM)   Return Visit with Lisandro Aguiar MD   [facility] Cancer [facility] ([facility])    [address] [place] [address]  [address]  [facility] [address]   [phone]            Mar 27, 2018  9:30 AM CDT   Infusion 60 with  ONCOLOGY INFUSION   [facility] ([facility])    [address] [place] [address]  [address]  [facility] [address]   [phone]              Further instructions from your care team       Discharge Instructions after  Upper Endoscopy (EGD)    Activity and Diet  You were given medicine for pain. You may be dizzy or sleepy.  For 24 hours:    Do not drive or use heavy equipment.    Do not make important decisions.    Do not drink any alcohol.    You may return to your regular diet.    Discomfort  You may have a sore throat for 2 to 3 days. It may help to:    Avoid hot liquids for 24 hours.    Use sore throat lozenges.    Gargle as needed with salt water up to 4 times a day. Mix 1 cup of warm water  with 1 teaspoon of salt. Do not swallow.      You may take Tylenol (acetaminophen) for pain unless your doctor has told you not to.    Do not take aspirin or ibuprofen (Advil, Motrin) or other NSAIDS  (anti-inflammatory drugs) for ___ days.    Follow-up    We took small tissue samples for study. If you do not have a follow-up visit scheduled,  call your provider s office in 2 weeks for the results.    When to call us:  Problems are rare. Call right away if you have:    Unusual  throat pain or trouble swallowing    Unusual pain in belly or chest that is not relieved by belching or passing air    Black stools (tar-like looking bowel movement)    Temperature above 100.6  F. (37.5  C).    If you vomit blood or have severe pain, go to an emergency room.    If you have questions, call:  Monday to Friday, 7 a.m. to 4:30 p.m.: Endoscopy: [phone] (We may have to call you back)    After hours: Hospital: [phone] (Ask for the GI fellow on call)    Pending Results     No orders found from 2/14/2018 to 2/17/2018.            Admission Information     Date & Time Provider Department Dept. Phone    2/16/2018 Paty Herman MD [facility], [place], Endoscopy [phone]      Your Vitals Were     Blood Pressure Respirations Pulse Oximetry             120/57 12 97%         MyChart Information     [facility] gives you secure access to your electronic health record. If you see a primary care provider, you can also send messages to your care team and make appointments. If you have questions, please call your primary care clinic.  If you do not have a primary care provider, please call [phone] and they will assist you.        Care EveryWhere ID     This is your Care EveryWhere ID. This could be used by other organizations to access your [place] medical records  [id number]        Equal Access to Services     NOE VAZQUEZ : Hadii kamran ku hadasho Sopepeali, waaxda luqadaha, qaybta kaalmada adeegyada, alexa beltran. So [facility] [phone].    ATENCIÓN: Si habla español, tiene a lagunas disposición servicios gratuitos de asistencia lingüística. Llame al [phone].    We comply with applicable federal civil rights laws and [place] laws. We do not discriminate on the basis of race, color, national origin, age, disability, sex, sexual orientation, or gender identity.               Review of your medicines      UNREVIEWED medicines. Ask your doctor about these medicines        Dose /  Directions    albuterol 108 (90 BASE) MCG/ACT Inhaler   Commonly known as:  PROAIR HFA/PROVENTIL HFA/VENTOLIN HFA   Used for:  Cough        Dose:  2 puff   Inhale 2 puffs into the lungs every 6 hours as needed for shortness of breath / dyspnea or wheezing   Quantity:  1 Inhaler   Refills:  0       benzonatate 200 MG capsule   Commonly known as:  TESSALON   Used for:  Cough        Dose:  200 mg   Take 1 capsule (200 mg) by mouth 3 times daily as needed for cough   Quantity:  21 capsule   Refills:  0       cephALEXin 500 MG capsule   Commonly known as:  KEFLEX   Indication:  Infection of the Skin and/or Related Soft Tissue   Used for:  Cellulitis of left upper extremity        Dose:  500 mg   Take 1 capsule (500 mg) by mouth every 6 hours   Quantity:  56 capsule   Refills:  0       guaiFENesin-codeine 100-10 MG/5ML Soln solution   Commonly known as:  ROBITUSSIN AC   Used for:  Cough        Dose:  1-2 tsp.   Take 5-10 mLs by mouth every 6 hours as needed for cough   Quantity:  240 mL   Refills:  0       tamoxifen 20 MG tablet   Commonly known as:  NOLVADEX   Used for:  Cancer of central portion of left breast (H)        Dose:  20 mg   Take 1 tablet (20 mg) by mouth daily   Quantity:  90 tablet   Refills:  3       VITAMIN D (CHOLECALCIFEROL) PO        Dose:  1000 Units   Take 1,000 Units by mouth daily   Refills:  0         CONTINUE these medicines which have NOT CHANGED        Dose / Directions    order for DME   Used for:  Lymphedema of left upper extremity        L UE class 2 compression sleeve and gauntlet Night garment Bandaging supplies   Quantity:  1 each   Refills:  1                Protect others around you: Learn how to safely use, store and throw away your medicines at www.disposemymeds.org.             Medication List: This is a list of all your medications and when to take them. Check marks below indicate your daily home schedule. Keep this list as a reference.      Medications           Morning Afternoon  Evening Bedtime As Needed    albuterol 108 (90 BASE) MCG/ACT Inhaler   Commonly known as:  PROAIR HFA/PROVENTIL HFA/VENTOLIN HFA   Inhale 2 puffs into the lungs every 6 hours as needed for shortness of breath / dyspnea or wheezing                                benzonatate 200 MG capsule   Commonly known as:  TESSALON   Take 1 capsule (200 mg) by mouth 3 times daily as needed for cough                                cephALEXin 500 MG capsule   Commonly known as:  KEFLEX   Take 1 capsule (500 mg) by mouth every 6 hours                                guaiFENesin-codeine 100-10 MG/5ML Soln solution   Commonly known as:  ROBITUSSIN AC   Take 5-10 mLs by mouth every 6 hours as needed for cough                                order for DME   L UE class 2 compression sleeve and gauntlet Night garment Bandaging supplies                                tamoxifen 20 MG tablet   Commonly known as:  NOLVADEX   Take 1 tablet (20 mg) by mouth daily                                VITAMIN D (CHOLECALCIFEROL) PO   Take 1,000 Units by mouth daily

## 2018-02-16 NOTE — DISCHARGE INSTRUCTIONS
Discharge Instructions after  Upper Endoscopy (EGD)    Activity and Diet  You were given medicine for pain. You may be dizzy or sleepy.  For 24 hours:    Do not drive or use heavy equipment.    Do not make important decisions.    Do not drink any alcohol.    You may return to your regular diet.    Discomfort  You may have a sore throat for 2 to 3 days. It may help to:    Avoid hot liquids for 24 hours.    Use sore throat lozenges.    Gargle as needed with salt water up to 4 times a day. Mix 1 cup of warm water  with 1 teaspoon of salt. Do not swallow.      You may take Tylenol (acetaminophen) for pain unless your doctor has told you not to.    Do not take aspirin or ibuprofen (Advil, Motrin) or other NSAIDS  (anti-inflammatory drugs) for ___ days.    Follow-up    We took small tissue samples for study. If you do not have a follow-up visit scheduled,  call your provider s office in 2 weeks for the results.    When to call us:  Problems are rare. Call right away if you have:    Unusual throat pain or trouble swallowing    Unusual pain in belly or chest that is not relieved by belching or passing air    Black stools (tar-like looking bowel movement)    Temperature above 100.6  F. (37.5  C).    If you vomit blood or have severe pain, go to an emergency room.    If you have questions, call:  Monday to Friday, 7 a.m. to 4:30 p.m.: Endoscopy: [phone] (We may have to call you back)    After hours: Hospital: [phone] (Ask for the GI fellow on call)

## 2018-02-16 NOTE — IP AVS SNAPSHOT
[facility], [place], Endoscopy    [address] [place] [address]    Phone:  [phone]                                       After Visit Summary   2/16/2018    Hoda Brush    MRN: [id number]           After Visit Summary Signature Page     I have received my discharge instructions, and my questions have been answered. I have discussed any challenges I see with this plan with the nurse or doctor.    ..........................................................................................................................................  Patient/Patient Representative Signature      ..........................................................................................................................................  Patient Representative Print Name and Relationship to Patient    ..................................................               ................................................  Date                                            Time    ..........................................................................................................................................  Reviewed by Signature/Title    ...................................................              ..............................................  Date                                                            Time

## 2018-02-16 NOTE — LETTER
"Patient:  Natasha Brush  :   1955  MRN:     [id number]        Ms.Larysa LAMIN Brush  [address] [address]        2018    Dear ,    We are writing to inform you of your test results.    There was mild inflammation in the stomach, but no infection or changes suggestive of cancer or other concerning features.   - you may benefit from acid suppression medications, but you need to discuss their safety and possible interaction with other medications with your oncology team.     It has been a pleasure participating in your care.  Please feel free to contact our clinic with any further questions.      Sincerely,    Yue Herman MD  [facility], [place], ENDOSCOPY  [address] [address]  Phone: [phone]   Resulted Orders   Surgical pathology exam   Result Value Ref Range    Copath Report       Patient Name: NATASHA BRUSH  MR#: [id number]  Specimen #: [id number]  Collected: 2018  Received: 2018  Reported: 2018 13:50  Ordering Phy(s): YUE HERMAN    For improved result formatting, select 'View Enhanced Report Format' under   Linked Documents section.    SPECIMEN(S):  Gastric antrum and body    FINAL DIAGNOSIS:  STOMACH, BODY, BIOPSY:  - Mild chronic gastritis  - No H. pylori like organisms identified on routine staining  - Negative for intestinal metaplasia or dysplasia    COMMENT:  This case was also reviewed by Dr. Lisandro Steinberg.    I have personally reviewed all specimens and/or slides, including the   listed special stains, and used them  with my medical judgement to determine or confirm the final diagnosis.    Electronically signed out by:    Alejandra Box M.D., [facility]    CLINICAL HISTORY:  Anemia.  GROSS:  The specimen is received in formalin with proper patient identification,   labeled \"gastric antrum and body\".  The specimen consists  of seven irregular tan pieces of soft tissue   averaging 0.2 cm in greatest " dimension  which are entirely submitted in cassette A1. (Dictated by: Peter Marquez   2/16/2018 12:33 PM)    MICROSCOPIC:  Microscopic examination was performed.    CPT Codes:  A: 17916-HL2    TESTING LAB LOCATION:  [facility], [address]   [address]  [phone]    COLLECTION SITE:  Client: General acute hospital  Location: [facility])

## 2018-02-16 NOTE — OR NURSING
named Mayra was present during entire Upper endoscopy exam. After Consenting to the procedure administered medication ordered verbally by MD. Tolerated procedure well. Biopsied as noted in nursing note.  Patient to follow up with Dr. Lisandro Aguiar as directed by MD.

## 2018-02-21 DIAGNOSIS — R05.9 COUGH: ICD-10-CM

## 2018-02-21 LAB — COPATH REPORT: NORMAL

## 2018-02-21 RX ORDER — ALBUTEROL SULFATE 90 UG/1
2 AEROSOL, METERED RESPIRATORY (INHALATION) EVERY 6 HOURS PRN
Qty: 1 INHALER | Refills: 0 | Status: SHIPPED | OUTPATIENT
Start: 2018-02-21 | End: 2018-09-13

## 2018-02-21 NOTE — TELEPHONE ENCOUNTER
Writer called patient with [place]  -spoke with Shyla daughter - consent to communicate on file - she spoke with her mother and advises patient didn't make a request for this medication - her symptoms have improved and she doesn't require use of this medication at this time    Closing encounter - no further actions needed at this time    Bret Nunn RN

## 2018-02-21 NOTE — TELEPHONE ENCOUNTER
"Requested Prescriptions   Pending Prescriptions Disp Refills     albuterol (PROAIR HFA/PROVENTIL HFA/VENTOLIN HFA) 108 (90 BASE) MCG/ACT Inhaler 1 Inhaler 0    Last Written Prescription Date:  1/25/18  Last Fill Quantity: 1 inhaler,  # refills: 0   Last office visit: 1/25/2018 with prescribing provider:  1/25/18   Future Office Visit:     Sig: Inhale 2 puffs into the lungs every 6 hours as needed for shortness of breath / dyspnea or wheezing    Asthma Maintenance Inhalers - Anticholinergics Passed    2/21/2018 12:16 PM       Passed - Patient is age 12 years or older       Passed - Recent or future visit with authorizing provider's specialty    Patient had office visit in the last year or has a visit in the next 30 days with authorizing provider.  See \"Patient Info\" tab in inbasket, or \"Choose Columns\" in Meds & Orders section of the refill encounter.               "

## 2018-02-21 NOTE — TELEPHONE ENCOUNTER
Please call patient and get update on symptoms. This medication was approved but I want to make sure she's getting better

## 2018-02-23 ENCOUNTER — HOSPITAL ENCOUNTER (OUTPATIENT)
Dept: OCCUPATIONAL THERAPY | Facility: CLINIC | Age: 63
Setting detail: THERAPIES SERIES
End: 2018-02-23
Attending: INTERNAL MEDICINE
Payer: COMMERCIAL

## 2018-02-23 PROCEDURE — 97140 MANUAL THERAPY 1/> REGIONS: CPT | Mod: GO | Performed by: OCCUPATIONAL THERAPIST

## 2018-02-23 PROCEDURE — 97535 SELF CARE MNGMENT TRAINING: CPT | Mod: GO | Performed by: OCCUPATIONAL THERAPIST

## 2018-02-23 PROCEDURE — 40000445 ZZHC STATISTIC OT VISIT, LYMPHEDEMA: Performed by: OCCUPATIONAL THERAPIST

## 2018-02-23 NOTE — PROGRESS NOTES
"   02/23/18 1030   Signing Clinician's Name / Credentials   Signing clinician's name / credentials China Dent OTR/L CLT-ARMIDA   Session Number   Session Number Ciara, 11/11 #19679 exp. 3/1/2018  (DISCHARGE NOTE)       Present Yes   Language Lebanese   Intprepeter Comment Jesse   Goal 1   Goal identifier LUE volume   Goal description For decreased risk infection, skin breakdown/wounds & progressive soft-tissue fibrosis volume will be reduced to less than 15% > vs LUE volume.   Target date 02/28/18   Date met 02/23/18  (Goal met)   Goal 2   Goal identifier GCB   Goal description To reduce volume of lymphedema and soft-tissue fibrosis pt will tolerate up to 23hr/day wear gradient compression bandaging (GCB) LUE.   Target date 01/31/18   Date met 01/19/18  (Goal met)   Goal 3   Goal identifier Home program   Goal description For long-term home mgmt chronic lymphedema pt/caregiver independent in home program a. GCB/GCB alternative garment for night wear b. compression garment for day wear c. ex to incr lymph flow/self-MLD.   Target date 02/28/18   Date met 02/23/18  (Goal met)   Goal 4   Goal identifier Lymphedema precautions   Goal description For decreased risk infection, skin breakdown/wounds, and progressive soft-tissue fibrosis patient will verbalize understanding re lymphedema precautions.   Target date 01/31/18   Date met 01/19/18  (Goal met)   Subjective Report   Subjective Report since completion of intensive CDT 1.19.18 pt cites + follow-through with HP, \"Everything you taught me.\"   Objective Measure 1   Objective Measure LUE msmts & exam   Details LUE rubbery soft-tissue fibrosis now only distal to elbow, B LAT PEC/SCAP regions approx symmetrical without bulge of lymphedema L LAT PEC that was present before CDT .   LUE volume > RUE = 15%; 11.13.17 on eval LUE > RUE = 23% (see \"Lymphedema Girth Measurement\" daily flowsheet for details)   Self Care/Home Management   Minutes 10 Minutes " "  Skilled Intervention Pt ED re HP   Patient Response Pt attentive to Ed provided & verbalized + understanding.   Treatment Detail Since last seen 1.19.18 pt cites + follow-through with HP except she just recieved compression glove 2.19.18, and rec'd a quilted directional flow sleeve for LUE that she was informed by compression  was an alternative to GCB for night wear.   She is bandaging fingers under quilted sleeve; advised that these quilted sleeves were only ever intended to be bandage liners and that it would be best if she applied even 1 SSB on top for compression.   She inquired if she could take compress glove off during day for ADLs e.g. cooking, and stated sometimes her fingernails have turned blue while wearing glove; advised to remove glove if fingers blue or white, stretch fingers over something cylindrical (e.g. a magic marker) to dry, OK to have glove off if hand/fingers don't swell & likely OK during chores maggy since she is bandaging fingers at night.   Progress Met goal #3   Manual Therapy   Minutes 10 Minutes   Skilled Intervention BUE/BUQ msmts & exa   Patient Response Cont'd reduction LUE volume s/p 1 month + follow through with HP.   Treatment Detail Took SUMMER magallon (see \"objective measure 1\" above for details).   Progress Met goal #1   Plan   Home program Night wear LUE GCB recommended (pt does have quilted directional flow sleeve she wears; advised to apply SSB on top for compression), day wear LUE compression garments sleeve & glove, self-MLD   Updates to plan of care Discharge Edema Treatment to home program.   Pt to return with new MD order if exacerbation of sxs or problems with HP; pt is in agreement with this POC.   Comments   Comments Pt has done well following through with home program and has continued reduction of LUE & LUQ lymphedema since last day of clinical CDT.     "

## 2018-03-05 RX ORDER — EPINEPHRINE 1 MG/ML
0.3 INJECTION, SOLUTION, CONCENTRATE INTRAVENOUS EVERY 5 MIN PRN
Status: CANCELLED | OUTPATIENT
Start: 2018-03-06

## 2018-03-05 RX ORDER — EPINEPHRINE 0.3 MG/.3ML
0.3 INJECTION SUBCUTANEOUS EVERY 5 MIN PRN
Status: CANCELLED | OUTPATIENT
Start: 2018-03-06

## 2018-03-05 RX ORDER — HEPARIN SODIUM (PORCINE) LOCK FLUSH IV SOLN 100 UNIT/ML 100 UNIT/ML
5 SOLUTION INTRAVENOUS EVERY 8 HOURS
Status: CANCELLED | OUTPATIENT
Start: 2018-03-06

## 2018-03-05 RX ORDER — MEPERIDINE HYDROCHLORIDE 25 MG/ML
25 INJECTION INTRAMUSCULAR; INTRAVENOUS; SUBCUTANEOUS EVERY 30 MIN PRN
Status: CANCELLED | OUTPATIENT
Start: 2018-03-06

## 2018-03-05 RX ORDER — METHYLPREDNISOLONE SODIUM SUCCINATE 125 MG/2ML
125 INJECTION, POWDER, LYOPHILIZED, FOR SOLUTION INTRAMUSCULAR; INTRAVENOUS
Status: CANCELLED
Start: 2018-03-06

## 2018-03-05 RX ORDER — ALBUTEROL SULFATE 90 UG/1
1-2 AEROSOL, METERED RESPIRATORY (INHALATION)
Status: CANCELLED
Start: 2018-03-06

## 2018-03-05 RX ORDER — ALBUTEROL SULFATE 0.83 MG/ML
2.5 SOLUTION RESPIRATORY (INHALATION)
Status: CANCELLED | OUTPATIENT
Start: 2018-03-06

## 2018-03-05 RX ORDER — DIPHENHYDRAMINE HCL 25 MG
50 CAPSULE ORAL ONCE
Status: CANCELLED
Start: 2018-03-06

## 2018-03-05 RX ORDER — SODIUM CHLORIDE 9 MG/ML
1000 INJECTION, SOLUTION INTRAVENOUS CONTINUOUS PRN
Status: CANCELLED
Start: 2018-03-06

## 2018-03-05 RX ORDER — LORAZEPAM 2 MG/ML
0.5 INJECTION INTRAMUSCULAR EVERY 4 HOURS PRN
Status: CANCELLED
Start: 2018-03-06

## 2018-03-05 RX ORDER — ACETAMINOPHEN 325 MG/1
650 TABLET ORAL
Status: CANCELLED | OUTPATIENT
Start: 2018-03-06

## 2018-03-05 RX ORDER — DIPHENHYDRAMINE HYDROCHLORIDE 50 MG/ML
50 INJECTION INTRAMUSCULAR; INTRAVENOUS
Status: CANCELLED
Start: 2018-03-06

## 2018-03-06 ENCOUNTER — CARE COORDINATION (OUTPATIENT)
Dept: ONCOLOGY | Facility: CLINIC | Age: 63
End: 2018-03-06

## 2018-03-06 ENCOUNTER — INFUSION THERAPY VISIT (OUTPATIENT)
Dept: ONCOLOGY | Facility: CLINIC | Age: 63
End: 2018-03-06
Attending: INTERNAL MEDICINE
Payer: COMMERCIAL

## 2018-03-06 VITALS
OXYGEN SATURATION: 100 % | SYSTOLIC BLOOD PRESSURE: 102 MMHG | HEART RATE: 82 BPM | BODY MASS INDEX: 31.65 KG/M2 | HEIGHT: 62 IN | RESPIRATION RATE: 18 BRPM | TEMPERATURE: 97.9 F | WEIGHT: 172 LBS | DIASTOLIC BLOOD PRESSURE: 60 MMHG

## 2018-03-06 DIAGNOSIS — C50.919 METASTATIC BREAST CANCER: Primary | ICD-10-CM

## 2018-03-06 DIAGNOSIS — C50.912 MALIGNANT NEOPLASM OF LEFT FEMALE BREAST, UNSPECIFIED ESTROGEN RECEPTOR STATUS, UNSPECIFIED SITE OF BREAST (H): Primary | ICD-10-CM

## 2018-03-06 DIAGNOSIS — C79.51 BONE METASTASIS: ICD-10-CM

## 2018-03-06 LAB
ALBUMIN SERPL-MCNC: 3.3 G/DL (ref 3.4–5)
ALP SERPL-CCNC: 38 U/L (ref 40–150)
ALT SERPL W P-5'-P-CCNC: 15 U/L (ref 0–50)
ANION GAP SERPL CALCULATED.3IONS-SCNC: 6 MMOL/L (ref 3–14)
AST SERPL W P-5'-P-CCNC: 14 U/L (ref 0–45)
BASOPHILS # BLD AUTO: 0.1 10E9/L (ref 0–0.2)
BASOPHILS NFR BLD AUTO: 0.6 %
BILIRUB SERPL-MCNC: 0.2 MG/DL (ref 0.2–1.3)
BUN SERPL-MCNC: 12 MG/DL (ref 7–30)
CALCIUM SERPL-MCNC: 8.7 MG/DL (ref 8.5–10.1)
CANCER AG27-29 SERPL-ACNC: 11 U/ML (ref 0–39)
CEA SERPL-MCNC: 0.5 UG/L (ref 0–2.5)
CHLORIDE SERPL-SCNC: 107 MMOL/L (ref 94–109)
CO2 SERPL-SCNC: 28 MMOL/L (ref 20–32)
CREAT SERPL-MCNC: 0.76 MG/DL (ref 0.52–1.04)
DIFFERENTIAL METHOD BLD: NORMAL
EOSINOPHIL # BLD AUTO: 0.3 10E9/L (ref 0–0.7)
EOSINOPHIL NFR BLD AUTO: 3 %
ERYTHROCYTE [DISTWIDTH] IN BLOOD BY AUTOMATED COUNT: 13.2 % (ref 10–15)
GFR SERPL CREATININE-BSD FRML MDRD: 77 ML/MIN/1.7M2
GLUCOSE SERPL-MCNC: 98 MG/DL (ref 70–99)
HCT VFR BLD AUTO: 38 % (ref 35–47)
HGB BLD-MCNC: 12.1 G/DL (ref 11.7–15.7)
IMM GRANULOCYTES # BLD: 0 10E9/L (ref 0–0.4)
IMM GRANULOCYTES NFR BLD: 0.2 %
LYMPHOCYTES # BLD AUTO: 2.3 10E9/L (ref 0.8–5.3)
LYMPHOCYTES NFR BLD AUTO: 25.7 %
MCH RBC QN AUTO: 30.2 PG (ref 26.5–33)
MCHC RBC AUTO-ENTMCNC: 31.8 G/DL (ref 31.5–36.5)
MCV RBC AUTO: 95 FL (ref 78–100)
MONOCYTES # BLD AUTO: 0.5 10E9/L (ref 0–1.3)
MONOCYTES NFR BLD AUTO: 5.6 %
NEUTROPHILS # BLD AUTO: 5.8 10E9/L (ref 1.6–8.3)
NEUTROPHILS NFR BLD AUTO: 64.9 %
NRBC # BLD AUTO: 0 10*3/UL
NRBC BLD AUTO-RTO: 0 /100
PLATELET # BLD AUTO: 186 10E9/L (ref 150–450)
POTASSIUM SERPL-SCNC: 3.9 MMOL/L (ref 3.4–5.3)
PROT SERPL-MCNC: 7.4 G/DL (ref 6.8–8.8)
RBC # BLD AUTO: 4.01 10E12/L (ref 3.8–5.2)
SODIUM SERPL-SCNC: 141 MMOL/L (ref 133–144)
WBC # BLD AUTO: 9 10E9/L (ref 4–11)

## 2018-03-06 PROCEDURE — 25000128 H RX IP 250 OP 636: Mod: ZF | Performed by: PHYSICIAN ASSISTANT

## 2018-03-06 PROCEDURE — 99214 OFFICE O/P EST MOD 30 MIN: CPT | Mod: ZP | Performed by: PHYSICIAN ASSISTANT

## 2018-03-06 PROCEDURE — 80053 COMPREHEN METABOLIC PANEL: CPT | Performed by: INTERNAL MEDICINE

## 2018-03-06 PROCEDURE — 96372 THER/PROPH/DIAG INJ SC/IM: CPT | Mod: 59

## 2018-03-06 PROCEDURE — G0463 HOSPITAL OUTPT CLINIC VISIT: HCPCS | Mod: ZF

## 2018-03-06 PROCEDURE — 82378 CARCINOEMBRYONIC ANTIGEN: CPT | Performed by: INTERNAL MEDICINE

## 2018-03-06 PROCEDURE — 85025 COMPLETE CBC W/AUTO DIFF WBC: CPT | Performed by: INTERNAL MEDICINE

## 2018-03-06 PROCEDURE — 96413 CHEMO IV INFUSION 1 HR: CPT

## 2018-03-06 PROCEDURE — 86300 IMMUNOASSAY TUMOR CA 15-3: CPT | Performed by: INTERNAL MEDICINE

## 2018-03-06 PROCEDURE — T1013 SIGN LANG/ORAL INTERPRETER: HCPCS | Mod: U3

## 2018-03-06 PROCEDURE — 25000128 H RX IP 250 OP 636: Mod: ZF | Performed by: INTERNAL MEDICINE

## 2018-03-06 RX ORDER — HEPARIN SODIUM (PORCINE) LOCK FLUSH IV SOLN 100 UNIT/ML 100 UNIT/ML
5 SOLUTION INTRAVENOUS EVERY 8 HOURS PRN
Status: DISCONTINUED | OUTPATIENT
Start: 2018-03-06 | End: 2018-03-06 | Stop reason: HOSPADM

## 2018-03-06 RX ADMIN — TRASTUZUMAB 450 MG: 150 INJECTION, POWDER, LYOPHILIZED, FOR SOLUTION INTRAVENOUS at 10:36

## 2018-03-06 RX ADMIN — DENOSUMAB 120 MG: 120 INJECTION SUBCUTANEOUS at 10:39

## 2018-03-06 RX ADMIN — SODIUM CHLORIDE, PRESERVATIVE FREE 5 ML: 5 INJECTION INTRAVENOUS at 11:10

## 2018-03-06 ASSESSMENT — PAIN SCALES - GENERAL: PAINLEVEL: NO PAIN (0)

## 2018-03-06 NOTE — MR AVS SNAPSHOT
After Visit Summary   3/6/2018    Hoda Brush    MRN: [id number]           Patient Information     Date Of Birth          1955        Visit Information        Provider Department      3/6/2018 10:00 AM Sheeba Cifuentes;  20 ATC;  ONCOLOGY INFUSION  Services Department        Today's Diagnoses     Malignant neoplasm of left female breast, unspecified estrogen receptor status, unspecified site of breast (H)    -  1    Bone metastasis (H)          Care Instructions    Contact Numbers    [facility] Main Line: [phone]    Triage Nurse Line: [phone]      Please call the [facility] Nurse Triage line if you experience a temperature greater than or equal to 100.5, shaking chills, have uncontrolled nausea, vomiting and/or diarrhea, dizziness, shortness of breath, bleeding not relieved with pressure, or if you have any other questions or concerns.     If it is after hours, weekends, or holidays, please call the main hospital  at  [phone] and ask to speak to the adult Oncology doctor on call.     If you are having any concerning symptoms or wish to speak to a provider before your next infusion visit, please call your care coordinator or triage them so we can adequately serve you.      If you need to refill your narcotic prescription or other medication, please call triage before your infusion appointment.        March 2018 Sunday Monday Tuesday Wednesday Thursday Friday Saturday                       1     2     3       4     5     6     [facility] LAB DRAW    8:15 AM   (60 min.)   [facility] LAB DRAW   [facility] Lab Draw     [facility] RETURN    8:35 AM   (90 min.)   Jossie Sparrow PA   [facility], located within [facility] ONC INFUSION 60   10:00 AM   (60 min.)    ONCOLOGY INFUSION   [facility] 7     8     9     10       11     12     13     14     15     16     17       18     19     20     21     22     23     24        25     26     ECH COMPLETE    9:00 AM   (60 min.)   UCECHCR2   [facility] Echo     CT CHEST/ABDOMEN/PELVIS W   10:05 AM   (20 min.)   UCCT2   [facility] Imaging Center CT 27     [facility] LAB DRAW    8:00 AM   (15 min.)    [facility] LAB DRAW   [facility] Lab Draw     [facility] RETURN    8:15 AM   (30 min.)   Lisandro Aguiar MD   [facility] ONC INFUSION 60    9:30 AM   (60 min.)    ONCOLOGY INFUSION   [facility] 28     29     30     31 April 2018 Sunday Monday Tuesday Wednesday Thursday Friday Saturday   1     2     3     4     5     6     7       8     9     10     11     12     13     14       15     16     17     18     19     20     21       22     23     24     25     26     27     28       29     30                                          Recent Results (from the past 24 hour(s))   CBC with platelets differential    Collection Time: 03/06/18  8:22 AM   Result Value Ref Range    WBC 9.0 4.0 - 11.0 10e9/L    RBC Count 4.01 3.8 - 5.2 10e12/L    Hemoglobin 12.1 11.7 - 15.7 g/dL    Hematocrit 38.0 35.0 - 47.0 %    MCV 95 78 - 100 fl    MCH 30.2 26.5 - 33.0 pg    MCHC 31.8 31.5 - 36.5 g/dL    RDW 13.2 10.0 - 15.0 %    Platelet Count 186 150 - 450 10e9/L    Diff Method Automated Method     % Neutrophils 64.9 %    % Lymphocytes 25.7 %    % Monocytes 5.6 %    % Eosinophils 3.0 %    % Basophils 0.6 %    % Immature Granulocytes 0.2 %    Nucleated RBCs 0 0 /100    Absolute Neutrophil 5.8 1.6 - 8.3 10e9/L    Absolute Lymphocytes 2.3 0.8 - 5.3 10e9/L    Absolute Monocytes 0.5 0.0 - 1.3 10e9/L    Absolute Eosinophils 0.3 0.0 - 0.7 10e9/L    Absolute Basophils 0.1 0.0 - 0.2 10e9/L    Abs Immature Granulocytes 0.0 0 - 0.4 10e9/L    Absolute Nucleated RBC 0.0    Comprehensive metabolic panel    Collection Time: 03/06/18  8:22 AM   Result Value Ref Range    Sodium 141 133 - 144 mmol/L    Potassium 3.9 3.4 - 5.3 mmol/L    Chloride 107 94 - 109 mmol/L     Carbon Dioxide 28 20 - 32 mmol/L    Anion Gap 6 3 - 14 mmol/L    Glucose 98 70 - 99 mg/dL    Urea Nitrogen 12 7 - 30 mg/dL    Creatinine 0.76 0.52 - 1.04 mg/dL    GFR Estimate 77 >60 mL/min/1.7m2    GFR Estimate If Black >90 >60 mL/min/1.7m2    Calcium 8.7 8.5 - 10.1 mg/dL    Bilirubin Total 0.2 0.2 - 1.3 mg/dL    Albumin 3.3 (L) 3.4 - 5.0 g/dL    Protein Total 7.4 6.8 - 8.8 g/dL    Alkaline Phosphatase 38 (L) 40 - 150 U/L    ALT 15 0 - 50 U/L    AST 14 0 - 45 U/L   CA 27.29 Breast tumor marker    Collection Time: 03/06/18  8:22 AM   Result Value Ref Range    CA 27-29 11 0 - 39 U/mL   CEA    Collection Time: 03/06/18  8:22 AM   Result Value Ref Range    CEA 0.5 0 - 2.5 ug/L                 Follow-ups after your visit        Your next 10 appointments already scheduled     Mar 26, 2018  9:00 AM CDT   Ech Complete with [address] Echo ([facility])    [address] [place] [address] [phone]           1. Please bring or wear a comfortable two-piece outfit. 2. You may eat, drink and take your normal medicines. 3. For any questions that cannot be answered, please contact the ordering physician            Mar 26, 2018 10:20 AM CDT   (Arrive by 10:05 AM)   CT CHEST/ABDOMEN/PELVIS W CONTRAST with [address] Imaging Center CT ([facility])    [address] [place] [address] [phone]           Please bring any scans or X-rays taken at other hospitals, if similar tests were done. Also bring a list of your medicines, including vitamins, minerals and over-the-counter drugs. It is safest to leave personal items at home.  Be sure to tell your doctor:   If you have any allergies.   If there s any chance you are pregnant.   If you are breastfeeding.  You may have contrast for this exam. To prepare:   Do not eat or drink for 2 hours before your exam. If you need to take medicine, you may take it with small  sips of water. (We may ask you to take liquid medicine as well.)   The day before your exam, drink extra fluids at least six 8-ounce glasses (unless your doctor tells you to restrict your fluids).   You will be given instructions on how to drink the contrast.  Patients over 70 or patients with diabetes or kidney problems:   If you haven t had a blood test (creatinine test) within the last 30 days, the Cardiologist/Radiologist may require you to get this test prior to your exam.  If you have diabetes:   Continue to take your metformin medication on the day of your exam  Please wear loose clothing, such as a sweat suit or jogging clothes. Avoid snaps, zippers and other metal. We may ask you to undress and put on a hospital gown.  If you have any questions, please call the Imaging Department where you will have your exam.            Mar 27, 2018  8:00 AM CDT   [facility] Lab Draw with  [facility] LAB DRAW   [facility] Lab Draw ([facility])    [address] [place] [address]  [address]  [facility] [id number] [phone]            Mar 27, 2018  8:30 AM CDT   (Arrive by 8:15 AM)   Return Visit with Lisandro Aguiar MD   [facility] ([facility])    [address] [place] [address]  [address]  [facility] [address] [phone]            Mar 27, 2018  9:30 AM CDT   Infusion 60 with  ONCOLOGY INFUSION, UC 22 ATC   [facility] ([facility])    [address] [place] [address]  [address]  [facility] [id number] [phone]              Who to contact     If you have questions or need follow up information about today's clinic visit or your schedule please contact [facility] directly at [phone].  Normal or non-critical lab and imaging results will be communicated to you by MyChart, letter or phone within 4 business days after the clinic has received the results. If you do not hear from us within  7 days, please contact the clinic through [employer] or phone. If you have a critical or abnormal lab result, we will notify you by phone as soon as possible.  Submit refill requests through [employer] or call your pharmacy and they will forward the refill request to us. Please allow 3 business days for your refill to be completed.          Additional Information About Your Visit        [employer] Information     [employer] gives you secure access to your electronic health record. If you see a primary care provider, you can also send messages to your care team and make appointments. If you have questions, please call your primary care clinic.  If you do not have a primary care provider, please call [phone] and they will assist you.        Care EveryWhere ID     This is your Care EveryWhere ID. This could be used by other organizations to access your [facility] medical records  [id number]         Blood Pressure from Last 3 Encounters:   03/06/18 102/60   02/16/18 120/66   02/13/18 116/66    Weight from Last 3 Encounters:   03/06/18 78 kg (172 lb)   02/13/18 79.7 kg (175 lb 12.8 oz)   01/25/18 79.8 kg (176 lb)              We Performed the Following     CA 27.29 Breast tumor marker     CBC with platelets differential     CEA     Comprehensive metabolic panel        Primary Care Provider Fax #    Physician No Ref-Primary [phone]       No address on file        Equal Access to Services     NOE VAZQUEZ : Hadii kamran portilloo Sosusanna, waaxda luqadaha, qaybta kaalmada adeegyada, alexa angulo . So [facility] [phone].    ATENCIÓN: Si habla español, tiene a lagunas disposición servicios gratuitos de asistencia lingüística. Llame al [phone].    We comply with applicable federal civil rights laws and [place] laws. We do not discriminate on the basis of race, color, national origin, age, disability, sex, sexual orientation, or gender identity.            Thank you!     Thank you for choosing [facility]  for your care. Our goal is always to provide you with excellent care. Hearing back from our patients is one way we can continue to improve our services. Please take a few minutes to complete the written survey that you may receive in the mail after your visit with us. Thank you!             Your Updated Medication List - Protect others around you: Learn how to safely use, store and throw away your medicines at www.disposemymeds.org.          This list is accurate as of 3/6/18 12:48 PM.  Always use your most recent med list.                   Brand Name Dispense Instructions for use Diagnosis    albuterol 108 (90 BASE) MCG/ACT Inhaler    PROAIR HFA/PROVENTIL HFA/VENTOLIN HFA    1 Inhaler    Inhale 2 puffs into the lungs every 6 hours as needed for shortness of breath / dyspnea or wheezing    Cough       benzonatate 200 MG capsule    TESSALON    21 capsule    Take 1 capsule (200 mg) by mouth 3 times daily as needed for cough    Cough       cephALEXin 500 MG capsule    KEFLEX    56 capsule    Take 1 capsule (500 mg) by mouth every 6 hours    Cellulitis of left upper extremity       guaiFENesin-codeine 100-10 MG/5ML Soln solution    ROBITUSSIN AC    240 mL    Take 5-10 mLs by mouth every 6 hours as needed for cough    Cough       order for DME     1 each    L UE class 2 compression sleeve and gauntlet Night garment Bandaging supplies    Lymphedema of left upper extremity       tamoxifen 20 MG tablet    NOLVADEX    90 tablet    Take 1 tablet (20 mg) by mouth daily    Cancer of central portion of left breast (H)       VITAMIN D (CHOLECALCIFEROL) PO      Take 1,000 Units by mouth daily

## 2018-03-06 NOTE — NURSING NOTE
"Oncology Rooming Note    March 6, 2018 8:33 AM   Hoda Brush is a 63 year old female who presents for:    Chief Complaint   Patient presents with     Port Draw     Labs drawn via port by RN. Line flushed and hep locked. VS taken.     Oncology Clinic Visit     Return visit related to Breast Cancer     Initial Vitals: /60 (BP Location: Right arm, Patient Position: Sitting, Cuff Size: Adult Regular)  Pulse 82  Temp 97.9  F (36.6  C) (Oral)  Resp 18  Ht 1.575 m (5' 2.01\")  Wt 78 kg (172 lb)  SpO2 100%  BMI 31.45 kg/m2 Estimated body mass index is 31.45 kg/(m^2) as calculated from the following:    Height as of this encounter: 1.575 m (5' 2.01\").    Weight as of this encounter: 78 kg (172 lb). Body surface area is 1.85 meters squared.  No Pain (0) Comment: Data Unavailable   No LMP recorded. Patient is postmenopausal.  Allergies reviewed: Yes  Medications reviewed: Yes    Medications: Medication refills not needed today.  Pharmacy name entered into [employer]:    [id number] [place] [facility] [place], [place] - [address] [id number] - [place], [place] - [address] AT [facility] & [address] [place] [address] DRUG STORE [id number] - [place], [place] - [address] AT  [address] [facility]    Clinical concerns: No new concerns. Provider was notified.    10 minutes for nursing intake (face to face time)     Maria Fernanda Zhang LPN            "

## 2018-03-06 NOTE — MR AVS SNAPSHOT
After Visit Summary   3/6/2018    Hoda Brush    MRN: [id number]           Patient Information     Date Of Birth          1955        Visit Information        Provider Department      3/6/2018 8:35 AM Sheeba Cifuentes; Jossie Sparrow PA [facility]        Today's Diagnoses     Metastatic breast cancer (H)    -  1       Follow-ups after your visit        Your next 10 appointments already scheduled     Mar 26, 2018  9:00 AM CDT   Ech Complete with [address] [place] [address] Echo ([facility])    [address] [place] [address]  3rd [facility] [id number] [phone]           1. Please bring or wear a comfortable two-piece outfit. 2. You may eat, drink and take your normal medicines. 3. For any questions that cannot be answered, please contact the ordering physician            Mar 26, 2018 10:20 AM CDT   (Arrive by 10:05 AM)   CT CHEST/ABDOMEN/PELVIS W CONTRAST with [address] [place] [address] Imaging Center CT ([facility])    [address] [place] [id number] [phone]           Please bring any scans or X-rays taken at other hospitals, if similar tests were done. Also bring a list of your medicines, including vitamins, minerals and over-the-counter drugs. It is safest to leave personal items at home.  Be sure to tell your doctor:   If you have any allergies.   If there s any chance you are pregnant.   If you are breastfeeding.  You may have contrast for this exam. To prepare:   Do not eat or drink for 2 hours before your exam. If you need to take medicine, you may take it with small sips of water. (We may ask you to take liquid medicine as well.)   The day before your exam, drink extra fluids at least six 8-ounce glasses (unless your doctor tells you to restrict your fluids).   You will be given instructions on how to drink the contrast.  Patients over 70 or patients with diabetes or kidney problems:    If you haven t had a blood test (creatinine test) within the last 30 days, the Cardiologist/Radiologist may require you to get this test prior to your exam.  If you have diabetes:   Continue to take your metformin medication on the day of your exam  Please wear loose clothing, such as a sweat suit or jogging clothes. Avoid snaps, zippers and other metal. We may ask you to undress and put on a hospital gown.  If you have any questions, please call the Imaging Department where you will have your exam.            Mar 27, 2018  8:00 AM CDT   [facility] Lab Draw with [facility] LAB DRAW   [facility] Lab Draw ([facility])    [address] [place] [address]  [address]  [facility] [address] [phone]            Mar 27, 2018  8:30 AM CDT   (Arrive by 8:15 AM)   Return Visit with Lisandro Aguiar MD   [facility] ([facility])    [address] [place] [address]  [address]  [facility] [address] [phone]            Mar 27, 2018  9:30 AM CDT   Infusion 60 with  ONCOLOGY INFUSION, UC 22 ATC   [facility] ([facility])    [address] [place] [address]  [address]  [facility] [address] [phone]              Who to contact     If you have questions or need follow up information about today's clinic visit or your schedule please contact [facility]ER [facility] directly at [phone].  Normal or non-critical lab and imaging results will be communicated to you by MyChart, letter or phone within 4 business days after the clinic has received the results. If you do not hear from us within 7 days, please contact the clinic through MyChart or phone. If you have a critical or abnormal lab result, we will notify you by phone as soon as possible.  Submit refill requests through [employer] or call your pharmacy and they will forward the refill request to us. Please allow 3 business days for your refill to be  "completed.          Additional Information About Your Visit        [employer] Information     [employer] gives you secure access to your electronic health record. If you see a primary care provider, you can also send messages to your care team and make appointments. If you have questions, please call your primary care clinic.  If you do not have a primary care provider, please call [phone] and they will assist you.        Care EveryWhere ID     This is your Care EveryWhere ID. This could be used by other organizations to access your [place] medical records  [id number]        Your Vitals Were     Pulse Temperature Respirations Height Pulse Oximetry BMI (Body Mass Index)    82 97.9  F (36.6  C) (Oral) 18 1.575 m (5' 2.01\") 100% 31.45 kg/m2       Blood Pressure from Last 3 Encounters:   03/06/18 102/60   02/16/18 120/66   02/13/18 116/66    Weight from Last 3 Encounters:   03/06/18 78 kg (172 lb)   02/13/18 79.7 kg (175 lb 12.8 oz)   01/25/18 79.8 kg (176 lb)              Today, you had the following     No orders found for display       Primary Care Provider Fax #    Physician No Ref-Primary [phone]       No address on file        Equal Access to Services     NOE VAZQUEZ : Hadii kamran portilloo Sosusanna, waaxda luqadaha, qaybta kaalmada adeegyada, alexa angulo . So [facility] [phone].    ATENCIÓN: Si habla español, tiene a lagunas disposición servicios gratuitos de asistencia lingüística. Llame al [phone].    We comply with applicable federal civil rights laws and [place] laws. We do not discriminate on the basis of race, color, national origin, age, disability, sex, sexual orientation, or gender identity.            Thank you!     Thank you for choosing [facility]ER [facility]  for your care. Our goal is always to provide you with excellent care. Hearing back from our patients is one way we can continue to improve our services. Please take a few minutes to complete the " written survey that you may receive in the mail after your visit with us. Thank you!             Your Updated Medication List - Protect others around you: Learn how to safely use, store and throw away your medicines at www.[employer] InstituteemSyntertainmenteds.org.          This list is accurate as of 3/6/18 10:08 AM.  Always use your most recent med list.                   Brand Name Dispense Instructions for use Diagnosis    albuterol 108 (90 BASE) MCG/ACT Inhaler    PROAIR HFA/PROVENTIL HFA/VENTOLIN HFA    1 Inhaler    Inhale 2 puffs into the lungs every 6 hours as needed for shortness of breath / dyspnea or wheezing    Cough       benzonatate 200 MG capsule    TESSALON    21 capsule    Take 1 capsule (200 mg) by mouth 3 times daily as needed for cough    Cough       cephALEXin 500 MG capsule    KEFLEX    56 capsule    Take 1 capsule (500 mg) by mouth every 6 hours    Cellulitis of left upper extremity       guaiFENesin-codeine 100-10 MG/5ML Soln solution    ROBITUSSIN AC    240 mL    Take 5-10 mLs by mouth every 6 hours as needed for cough    Cough       order for DME     1 each    L UE class 2 compression sleeve and gauntlet Night garment Bandaging supplies    Lymphedema of left upper extremity       tamoxifen 20 MG tablet    NOLVADEX    90 tablet    Take 1 tablet (20 mg) by mouth daily    Cancer of central portion of left breast (H)       VITAMIN D (CHOLECALCIFEROL) PO      Take 1,000 Units by mouth daily

## 2018-03-06 NOTE — LETTER
3/6/2018      RE: Hoda Brush  [address] [facility] [address]       Oncology/Hematology Visit Note  Mar 6, 2018    Reason for Visit: follow up of ER positive, HER2 positive left metastatic breast cancer    History of Present Illness: Hoda Brush is a 63 year old female with metastatic ER positive, HER 2 positive left breast cancer with bone mets.     TREATMENT HISTORY:  A. Initial diagnosis with metastatic breast cancer in [facility].  Neoadjuvant CAF x 6.   B. Left mastectomy. Left axillary node dissection.  C.  Radiation in 1 dose to R iliac region. g Gy.  C. Herceptin for 2 years taxane for a prescribed course then stopped, monthly zoledronic acid.  She had 2 years of Herceptin with tamoxifen added after chemotherapy.  D.  Tamoxifen alone and zoledronic acid every 3 months.  [place]  [place]  [place] restarted Herceptin every 3 weeks and continued tamoxifen. Bone targeted agent changed to denosumab every 6 weeks.     She was admitted 9/20-9/21 with LUE cellulitis with leukocytosis. Given a dose of IV Ancef and discharged on keflex. Last restaging was 12/21/17 with PET/CT showing KIERA.      She presents prior to her next dose of Herceptin.      Interval History:  Hoda has been feeling overall well. Denies any recent changes in her health. Tolerating Tamoxifen well. Denies any pain, fatigue, depression, anxiety. She is wearing sleeve for her left arm and denies any pain, redness or skin problems. She is taking Vitamin D 1,000 units daily and calcium once daily, although she is unsure of the dose.   Her 10-point review of systems is otherwise negative.     Current Outpatient Prescriptions   Medication Sig Dispense Refill     albuterol (PROAIR HFA/PROVENTIL HFA/VENTOLIN HFA) 108 (90 BASE) MCG/ACT Inhaler Inhale 2 puffs into the lungs every 6 hours as needed for shortness of breath / dyspnea or wheezing 1 Inhaler 0     benzonatate (TESSALON) 200 MG capsule Take 1 capsule (200 mg) by mouth 3  times daily as needed for cough (Patient not taking: Reported on 2/13/2018) 21 capsule 0     guaiFENesin-codeine (ROBITUSSIN AC) 100-10 MG/5ML SOLN solution Take 5-10 mLs by mouth every 6 hours as needed for cough (Patient not taking: Reported on 2/13/2018) 240 mL 0     tamoxifen (NOLVADEX) 20 MG tablet Take 1 tablet (20 mg) by mouth daily 90 tablet 3     cephALEXin (KEFLEX) 500 MG capsule Take 1 capsule (500 mg) by mouth every 6 hours (Patient not taking: Reported on 2/13/2018) 56 capsule 0     VITAMIN D, CHOLECALCIFEROL, PO Take 1,000 Units by mouth daily       order for DME L UE class 2 compression sleeve and gauntlet  Night garment  Bandaging supplies (Patient not taking: Reported on 2/13/2018) 1 each 1       Physical Examination:  General: The patient is a pleasant female in no acute distress.  There were no vitals taken for this visit.  Wt Readings from Last 10 Encounters:   02/13/18 79.7 kg (175 lb 12.8 oz)   01/25/18 79.8 kg (176 lb)   01/24/18 79.7 kg (175 lb 12.8 oz)   01/05/18 80.6 kg (177 lb 9.6 oz)   12/12/17 79.2 kg (174 lb 11.2 oz)   11/22/17 78.8 kg (173 lb 12.8 oz)   10/31/17 79.2 kg (174 lb 9.6 oz)   10/10/17 78.8 kg (173 lb 12.8 oz)   09/28/17 78.8 kg (173 lb 12.8 oz)   09/20/17 79.4 kg (175 lb 1.6 oz)     HEENT: EOMI, PERRL. Sclerae are anicteric. Oral mucosa is pink and moist with no lesions or thrush.   Lymph: No lymphadenopathy in the cervical, supraclavicular or axillary areas. Left arm with some non-pitting edema compared to right.  Breast: Deferred today  Heart: Regular rate and rhythm.   Lungs: Clear to auscultation bilaterally.   Abdomen: Bowel sounds present, soft, nontender, nondistended  Extremities: No lower extremity edema noted bilaterally.   Neuro: Cranial nerves II through XII are grossly intact.  Skin: No rashes, petechiae, or bruising noted on exposed skin.    Laboratory Data:  Results for NATASHA LOUIS (MRN [id number]) as of 3/6/2018 08:52   3/6/2018 08:22   Sodium 141    Potassium 3.9   Chloride 107   Carbon Dioxide 28   Urea Nitrogen 12   Creatinine 0.76   GFR Estimate 77   GFR Estimate If Black >90   Calcium 8.7   Anion Gap 6   Albumin 3.3 (L)   Protein Total 7.4   Bilirubin Total 0.2   Alkaline Phosphatase 38 (L)   ALT 15   AST 14   Glucose 98   WBC 9.0   Hemoglobin 12.1   Hematocrit 38.0   Platelet Count 186   RBC Count 4.01   MCV 95   MCH 30.2   MCHC 31.8   RDW 13.2   Diff Method Automated Method   % Neutrophils 64.9   % Lymphocytes 25.7   % Monocytes 5.6   % Eosinophils 3.0   % Basophils 0.6   % Immature Granulocytes 0.2   Nucleated RBCs 0   Absolute Neutrophil 5.8   Absolute Lymphocytes 2.3   Absolute Monocytes 0.5   Absolute Eosinophils 0.3   Absolute Basophils 0.1   Abs Immature Granulocytes 0.0   Absolute Nucleated RBC 0.0     Ca 27-29 and CEA pending    Assessment and Plan:     1. Metastatic breast cancer, ER, [place], HER2+ positive: Was last treated in [facility] with Herceptin. We have continued Herceptin every 3 weeks as well as daily tamoxifen. She tolerates treatment extremely well without overt symptoms. Was restaged in December with KIERA. Echocardiogram 12/2017 with EF 55-60% and normal LV function.  Continue Herceptin every 3 weeks. Continue Tamoxifen. RTC to see Dr. Aguiar in 3 weeks with Echo and CT CAP prior.      2. Bone metastasis: Continue xgeva every 6 weeks. Takes calcium daily, unsure of dose. She is on vitamin D3 1000 IU daily.   --Xgeva today.      3. LUE lymphedema: She completed lymphedema treatment and has a sleeve to use now.     4. Genetic testing: negative    5. Anemia: Mild, normocytic. Negative colonoscopy (2/1/18). EGD with biopsies (2/16/18) that showed gastritis, no H pylori, intestinal metaplasia or dysplasia. Hgb 12.1 today and within normal range.      Jossie Sparrow PA-C  [facility]  [address], [place] [id number] [phone]

## 2018-03-06 NOTE — PROGRESS NOTES
Infusion Nursing Note:  Hoda Brush presents today for C9D1 Herceptin, C5D1 Xgeva.    Patient seen by provider today: Yes: Jossie CHA    Note: N/A.    Intravenous Access:  Implanted Port.      Treatment Conditions:  Lab Results   Component Value Date    HGB 12.1 03/06/2018     Lab Results   Component Value Date    WBC 9.0 03/06/2018      Lab Results   Component Value Date    ANEU 5.8 03/06/2018     Lab Results   Component Value Date     03/06/2018      Lab Results   Component Value Date     03/06/2018                   Lab Results   Component Value Date    POTASSIUM 3.9 03/06/2018           No results found for: MAG         Lab Results   Component Value Date    CR 0.76 03/06/2018                   Lab Results   Component Value Date    TAYLER 8.7 03/06/2018                Lab Results   Component Value Date    BILITOTAL 0.2 03/06/2018           Lab Results   Component Value Date    ALBUMIN 3.3 03/06/2018                    Lab Results   Component Value Date    ALT 15 03/06/2018           Lab Results   Component Value Date    AST 14 03/06/2018       Results reviewed, labs MET treatment parameters, ok to proceed with treatment.      Post Infusion Assessment:  Patient tolerated infusion without incident.  Patient tolerated one xgeva injection without incident to right arm.  Blood return noted pre and post infusion.  Site patent and intact, free from redness, edema or discomfort.  No evidence of extravasations.  Access discontinued per protocol.    Discharge Plan:   Patient declined prescription refills.  Discharge instructions reviewed with: Patient.  Patient and/or family verbalized understanding of discharge instructions and all questions answered.  AVS to patient via [place].  Patient will return 3/26/18 for ECHO and CT scan and 3/27/18 to see Dr. Aguiar and Jeffery for next appointment.   Patient discharged in stable condition accompanied by: .  Departure Mode:  Ambulatory.    Shaina Moffett RN

## 2018-03-06 NOTE — PROGRESS NOTES
Oncology/Hematology Visit Note  Mar 6, 2018    Reason for Visit: follow up of ER positive, HER2 positive left metastatic breast cancer    History of Present Illness: Hoda Brush is a 63 year old female with metastatic ER positive, HER 2 positive left breast cancer with bone mets.     TREATMENT HISTORY:  A. Initial diagnosis with metastatic breast cancer in [facility].  Neoadjuvant CAF x 6.   B. Left mastectomy. Left axillary node dissection.  C.  Radiation in 1 dose to R iliac region. g Gy.  C. Herceptin for 2 years taxane for a prescribed course then stopped, monthly zoledronic acid.  She had 2 years of Herceptin with tamoxifen added after chemotherapy.  D.  Tamoxifen alone and zoledronic acid every 3 months.  [place]  [place]  [place] restarted Herceptin every 3 weeks and continued tamoxifen. Bone targeted agent changed to denosumab every 6 weeks.     She was admitted 9/20-9/21 with LUE cellulitis with leukocytosis. Given a dose of IV Ancef and discharged on keflex. Last restaging was 12/21/17 with PET/CT showing KIERA.      She presents prior to her next dose of Herceptin.      Interval History:  Hoda has been feeling overall well. Denies any recent changes in her health. Tolerating Tamoxifen well. Denies any pain, fatigue, depression, anxiety. She is wearing sleeve for her left arm and denies any pain, redness or skin problems. She is taking Vitamin D 1,000 units daily and calcium once daily, although she is unsure of the dose.   Her 10-point review of systems is otherwise negative.     Current Outpatient Prescriptions   Medication Sig Dispense Refill     albuterol (PROAIR HFA/PROVENTIL HFA/VENTOLIN HFA) 108 (90 BASE) MCG/ACT Inhaler Inhale 2 puffs into the lungs every 6 hours as needed for shortness of breath / dyspnea or wheezing 1 Inhaler 0     benzonatate (TESSALON) 200 MG capsule Take 1 capsule (200 mg) by mouth 3 times daily as needed for cough (Patient not taking: Reported on 2/13/2018) 21 capsule  0     guaiFENesin-codeine (ROBITUSSIN AC) 100-10 MG/5ML SOLN solution Take 5-10 mLs by mouth every 6 hours as needed for cough (Patient not taking: Reported on 2/13/2018) 240 mL 0     tamoxifen (NOLVADEX) 20 MG tablet Take 1 tablet (20 mg) by mouth daily 90 tablet 3     cephALEXin (KEFLEX) 500 MG capsule Take 1 capsule (500 mg) by mouth every 6 hours (Patient not taking: Reported on 2/13/2018) 56 capsule 0     VITAMIN D, CHOLECALCIFEROL, PO Take 1,000 Units by mouth daily       order for DME L UE class 2 compression sleeve and gauntlet  Night garment  Bandaging supplies (Patient not taking: Reported on 2/13/2018) 1 each 1       Physical Examination:  General: The patient is a pleasant female in no acute distress.  There were no vitals taken for this visit.  Wt Readings from Last 10 Encounters:   02/13/18 79.7 kg (175 lb 12.8 oz)   01/25/18 79.8 kg (176 lb)   01/24/18 79.7 kg (175 lb 12.8 oz)   01/05/18 80.6 kg (177 lb 9.6 oz)   12/12/17 79.2 kg (174 lb 11.2 oz)   11/22/17 78.8 kg (173 lb 12.8 oz)   10/31/17 79.2 kg (174 lb 9.6 oz)   10/10/17 78.8 kg (173 lb 12.8 oz)   09/28/17 78.8 kg (173 lb 12.8 oz)   09/20/17 79.4 kg (175 lb 1.6 oz)     HEENT: EOMI, PERRL. Sclerae are anicteric. Oral mucosa is pink and moist with no lesions or thrush.   Lymph: No lymphadenopathy in the cervical, supraclavicular or axillary areas. Left arm with some non-pitting edema compared to right.  Breast: Deferred today  Heart: Regular rate and rhythm.   Lungs: Clear to auscultation bilaterally.   Abdomen: Bowel sounds present, soft, nontender, nondistended  Extremities: No lower extremity edema noted bilaterally.   Neuro: Cranial nerves II through XII are grossly intact.  Skin: No rashes, petechiae, or bruising noted on exposed skin.    Laboratory Data:  Results for KROSHYNSKAYA, NATASHA I (MRN [id number]) as of 3/6/2018 08:52   3/6/2018 08:22   Sodium 141   Potassium 3.9   Chloride 107   Carbon Dioxide 28   Urea Nitrogen 12   Creatinine  0.76   GFR Estimate 77   GFR Estimate If Black >90   Calcium 8.7   Anion Gap 6   Albumin 3.3 (L)   Protein Total 7.4   Bilirubin Total 0.2   Alkaline Phosphatase 38 (L)   ALT 15   AST 14   Glucose 98   WBC 9.0   Hemoglobin 12.1   Hematocrit 38.0   Platelet Count 186   RBC Count 4.01   MCV 95   MCH 30.2   MCHC 31.8   RDW 13.2   Diff Method Automated Method   % Neutrophils 64.9   % Lymphocytes 25.7   % Monocytes 5.6   % Eosinophils 3.0   % Basophils 0.6   % Immature Granulocytes 0.2   Nucleated RBCs 0   Absolute Neutrophil 5.8   Absolute Lymphocytes 2.3   Absolute Monocytes 0.5   Absolute Eosinophils 0.3   Absolute Basophils 0.1   Abs Immature Granulocytes 0.0   Absolute Nucleated RBC 0.0     Ca 27-29 and CEA pending    Assessment and Plan:     1. Metastatic breast cancer, [place], [place], HER2+ positive: Was last treated in [facility] with Herceptin. We have continued Herceptin every 3 weeks as well as daily tamoxifen. She tolerates treatment extremely well without overt symptoms. Was restaged in December with KIERA. Echocardiogram 12/2017 with EF 55-60% and normal LV function.  Continue Herceptin every 3 weeks. Continue Tamoxifen. RTC to see Dr. Aguiar in 3 weeks with Echo and CT CAP prior.      2. Bone metastasis: Continue xgeva every 6 weeks. Takes calcium daily, unsure of dose. She is on vitamin D3 1000 IU daily.   --Xgeva today.      3. LUE lymphedema: She completed lymphedema treatment and has a sleeve to use now.     4. Genetic testing: negative    5. Anemia: Mild, normocytic. Negative colonoscopy (2/1/18). EGD with biopsies (2/16/18) that showed gastritis, no H pylori, intestinal metaplasia or dysplasia. Hgb 12.1 today and within normal range.      Jossie Sparrow PA-C  [facility] [address] [place] [address]  [phone]

## 2018-03-06 NOTE — NURSING NOTE
Chief Complaint   Patient presents with     Port Draw     Labs drawn via port by RN. Line flushed and hep locked. VS taken.     Mirta Henderson RN

## 2018-03-06 NOTE — PROGRESS NOTES
"Spoke to patient's daughter after verifying permission signed to discuss patient's health for Dr Aguiar's recommendations \"Mild gastritis on her endoscopy. Can you see if she would like to try Zantac 150 mg qD?\" Patient's daughter will speak to patient and return call with patient's decision.  Answered all patient's daughter's questions and verbalized understanding. Cortney Ramos RN, BSN.    Spoke to patient's daughter and she does not want to \"take another medication.\" Will send message to Dr Aguiar with patient's decision not to take Zantac.  Answered all patient's daughter's questions and verbalized understanding. Cortney Ramos RN, BSN.    "

## 2018-03-25 NOTE — PROGRESS NOTES
Hoda is seen with her daughter, Ashleigh, today for a new patient visit.  The patient is a refugee from [facility] and is here for continuation of care for her metastatic ER+HER2- breast cancer.  She is a [employer] refugee from [facility] and was seen in clinic with her daughter.  The only data we have from [facility] is an English translation of her records.       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  On 02/11/2014, she was seen by an oncologist.  The clinical staging of T4b N2 M1 was noted.   Her breast cancer was on the left side. There was no right breast cancer, confirmed by the patient and her daughter, correcting a possible error in the translated records.  ER was positive in 100% of the cells, NJ at 14% and HER2 was 3+ positive.  It appears that this histopathologic information is on the mastectomy specimen. She underwent an MRI for staging of presumptive bone metastases which was performed 03/02/2014.  There were skeletal metastases in the thoracic vertebrae at 12, L1, L3, L5 and S1 vertebral body, ranging in size from 0.7-3.0 cm in size.  Ischial and right femur were also involved, as well as a large iliac mass measuring about 15 cm in the uterus. 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   Radiation Oncology consultation was performed 03/11/2014, and she was given radiation in 1 dose of 6 Gy to the right iliac lesion.        With the initial diagnosis, she initiated treatment with 6 cycles of CAF neoadjuvant chemotherapy 02/14, 07/07, 03/28, 04/18/14, 05/08 and 05/29/14. She also received monthly zoledronic acid.  She then underwent a modified left mastectomy of Palacios type and left lymphadenoectomy on 06/27/2014.   Pathologic examination showed number at [facility] was [id number] showed infiltrative grade 2 ductal cancer with 6 level 1 metastatic lymph nodes and 3 level 2 metastatic lymph nodes.  The differentiation was  intermediate.  The tumor was ER positive 70% of the cells, KY positive in 40% of the cells and HER2 was 3+ by immunohistochemistry and the Ki-67 labeling index was 20%. Her staging after surgery was stage IV, pT4b N2 M1.  I don't see a biopsy of the skeletal metastases.  She then had continuation with chemotherapy with 4 cycles of Herceptin and taxane with monthly zoledronic acid.  Tamoxifen was initiated.  She then had two years of Herceptin and a decision was made not to continue further Herceptin.  She continued on zoledronic acid every 3 months. She was clinically stable. She then [place] the [place] new refugee from [facility].  She arrived last month, established [place] residency and now seeks further oncology care.      TREATMENT HISTORY:  A. Initial diagnosis with metastatic breast cancer in [facility].  Neoadjuvant CAF x 6.   B. Left mastectomy. Left axillary node dissection.  C.  Radiation in 1 dose to R iliac region. g Gy.  C. Herceptin for 2 years taxane for a prescribed course then stopped, monthly zoledronic acid.  She had 2 years of Herceptin with tamoxifen added after chemotherapy.  D.  Tamoxifen alone and zoledronic acid every 3 months.  E.  Move to [facility].  We restarted Herceptin every 3 weeks and continued tamoxifen. Bone targeted agent changed to denosumab every 9 weeks.     INTERVAL HISTORY:  Hoda returns to clinic and has been doing quite well.  She has no pain, no fatigue, no depression, no anxiety.  A 10-point review of systems is entirely negative.  She continues on tamoxifen.  She has had no problems with uterine bleeding.  Her CT scan today shows stable disease.  She continues with calcium and vitamin D.  She has been eating a healthful diet and she has been walking for exercise.      PHYSICAL EXAMINATION:   VITAL SIGNS:  Blood pressure 108/73, temperature 97.8, pulse 73, respirations 16, O2 sat 97% on room air, height 1.6 meters and weight 79 kg.   GENERAL:  Hoda appeared generally  well.  She has no alopecia.   HEENT:  Oropharynx is without lesions.   LYMPH:  There is no palpable cervical, supraclavicular, subclavicular or axillary lymphadenopathy.   LUNGS:  Clear to percussion and auscultation.   HEART:  There is a regular rate and rhythm, S1, S2.   ABDOMEN:  Soft and nontender, without hepatosplenomegaly.   EXTREMITIES:  Without edema.   PSYCHIATRIC:  Mood and affect were normal.      LABORATORY DATA:  The CBC is remarkable for a hemoglobin of 11.5.  The CMP was within normal limits except for an albumin of 3.1.      ASSESSMENT AND PLAN:   1.  Hoda Brush is a 63-year-old woman with ER+HER2+ breast cancer from [facility], formerly from [facility], who came to live in the .  She lives with her daughter and is here for continuation of every-3-week Herceptin and tamoxifen.  Her tumor is ER positive, UT positive and HER2 positive.  She had metastatic disease at the time of presentation with bone-only metastases by report.  She presented and was diagnosed with metastatic breast cancer approximately 02/2014 when she was diagnosed with a T4N2M1 invasive ductal carcinoma of the left breast.  She underwent neoadjuvant CAF, left mastectomy and left axillary lymph node dissection and then radiation.  She also had radiation to the right iliac region where she has metastatic disease.  Pathology report from [facility] shows ER positive in 75% of the cells, UT positive in 40% of the cells, and HER2/kenia was 3+ positive by immunohistochemistry.  Ki-67 was 20%.  She had no evidence of metastatic disease on her PET CT except for multiple osseous foci of increased radiotracer uptake with metastatic disease.  This PET CT was 08/2017 and was apparently unchanged compared with the 06/12/2014 exam.    2.  Restaging.  There is also a stable left lower lobe pulmonary nodule which has been essentially unchanged in the followup CT scan performed yesterday.  Her brain MRI shows no evidence of metastasis and we have not  repeated this study and will not repeat this study unless there are symptoms.  Our plan is continued follow up with staging CT scans every 12 weeks.   3.  Breast imaging question.  We do not recommend mammography.   4.  Lymphedema referral was placed.   5.  Genetic testing.  Testing was negative.  Hoda is negative for mutations in the DEWEY, BRCA1, BRCA2, CDH1, CHEK2, NBN, NF1, PALB2, PTEN, STK11, and TP53 genes by sequencing and deletion/duplication analysis. No mutations were found in any of the 11 genes analyzed.  6.  Continue Xgeva every 9 weeks.   7.  Followup.  We will see Hoda in followup in our clinic in 3 weeks with KARISSA.   Follow up with KARISSA 4-17, 5-8, 5-29 and with me 6-19 with Herceptin each visit.  CBC, CMP, CA27.29 and CEA with each visit.  Xgeva 4-17 and 5-8 and then every 9 weeks.     Thank you for allowing us to continue to participate in Hoda's care.     Sincerely,    Lisandro Aguiar M.D.      Division of Hematology, Oncology, Transplant   Department of Medicine   [facility] [employer]   [phone]     EXAMINATION: CT CHEST/ABDOMEN/PELVIS W CONTRAST, 3/26/2018 10:58 AM     TECHNIQUE:  Helical CT images from the thoracic inlet through the  symphysis pubis were obtained  with contrast. Contrast dose: 105 mL of  Isovue-370.     COMPARISON: PET CTs dated 12/21/2017 and 8/21/2017.     HISTORY: ; Bone metastasis (H); Metastatic breast cancer (H)     FINDINGS:     Chest: Right IJ Yrwtnd-l-Tygs terminates near the atriocaval junction.  The heart is normal in size without pericardial effusion. A small left  axillary lymph nodes with mild haziness of the adjacent fat is stable  and nonenlarged, series 7, image 114. No pathologically enlarged  axillary, hilar, or mediastinal lymph nodes. Small right  tracheoesophageal lymph node is stable. No enlarged internal mammary  lymph nodes. Postoperative changes of left mastectomy. Stable 5 mm  nodule at the left lung base,  series 10, image 95, not significantly  changed since outside PET CT dated 6/12/2014. No new or enlarging  suspicious pulmonary nodules. Mild fibroatelectasis.     Abdomen and pelvis: Trace hypodensity along the falciform ligament  likely secondary to focal hepatic steatosis. No new liver lesions. The  gallbladder is decompressed with cholelithiasis. The spleen, pancreas,  adrenal glands, and kidneys remain within normal limits. The bowel is  without dilatation or adjacent inflammatory change. No pathologically  enlarged lymph nodes in the abdomen or pelvis. Leiomyomatous myomatous  uterus. No suspicious adnexal masses. The bladder is mostly  decompressed.     Bones and soft tissues: Unchanged appearance of the osseous structures  with lytic and sclerotic change involving the acetabulum and iliac  bones, right greater than left with medullary expansion and  trabeculation. Additional sclerotic osseous lesions involving the  visualized femurs, spine, sternum and ribs, grossly unchanged. No  acute fracture.         IMPRESSION:   1.  In this patient with a history of breast carcinoma status post  left mastectomy there is no evidence for disease progression. Stable  appearance of multiple sclerotic axial and appendicular osseous  lesions, predominantly within the spine and ribs, not metabolically  active on the most recent PET CT suggestive of treated metastases.  2. Stable expansile lytic and sclerotic appearance of the iliac bones.  3. Additional incidental findings including leiomyomatous uterus,  cholelithiasis and 5 mm left lower lobe pulmonary nodule stable since  at least 2014 are again noted.     BACILIO SCHAEFER MD        I spent 30 minutes with the patient more than 50% of which was in counseling and coordination of care.

## 2018-03-26 ENCOUNTER — RADIANT APPOINTMENT (OUTPATIENT)
Dept: CT IMAGING | Facility: CLINIC | Age: 63
End: 2018-03-26
Attending: INTERNAL MEDICINE
Payer: COMMERCIAL

## 2018-03-26 ENCOUNTER — TELEPHONE (OUTPATIENT)
Dept: FAMILY MEDICINE | Facility: CLINIC | Age: 63
End: 2018-03-26

## 2018-03-26 ENCOUNTER — RADIANT APPOINTMENT (OUTPATIENT)
Dept: CARDIOLOGY | Facility: CLINIC | Age: 63
End: 2018-03-26
Attending: INTERNAL MEDICINE
Payer: COMMERCIAL

## 2018-03-26 DIAGNOSIS — Z51.11 ENCOUNTER FOR ANTINEOPLASTIC CHEMOTHERAPY: ICD-10-CM

## 2018-03-26 DIAGNOSIS — C50.919 METASTATIC BREAST CANCER: ICD-10-CM

## 2018-03-26 DIAGNOSIS — C79.51 BONE METASTASIS: ICD-10-CM

## 2018-03-26 DIAGNOSIS — C50.912 MALIGNANT NEOPLASM OF LEFT FEMALE BREAST, UNSPECIFIED ESTROGEN RECEPTOR STATUS, UNSPECIFIED SITE OF BREAST (H): ICD-10-CM

## 2018-03-26 RX ORDER — HEPARIN SODIUM (PORCINE) LOCK FLUSH IV SOLN 100 UNIT/ML 100 UNIT/ML
5 SOLUTION INTRAVENOUS ONCE
Status: COMPLETED | OUTPATIENT
Start: 2018-03-26 | End: 2018-03-26

## 2018-03-26 RX ORDER — IOPAMIDOL 755 MG/ML
105 INJECTION, SOLUTION INTRAVASCULAR ONCE
Status: COMPLETED | OUTPATIENT
Start: 2018-03-26 | End: 2018-03-26

## 2018-03-26 RX ADMIN — IOPAMIDOL 105 ML: 755 INJECTION, SOLUTION INTRAVASCULAR at 10:44

## 2018-03-26 RX ADMIN — HEPARIN SODIUM (PORCINE) LOCK FLUSH IV SOLN 100 UNIT/ML 5 ML: 100 SOLUTION at 10:52

## 2018-03-26 NOTE — TELEPHONE ENCOUNTER
Panel Management Review      Patient has the following on her problem list: None      Composite cancer screening  Chart review shows that this patient is due/due soon for the following Mammogram  Summary:    Patient is due/failing the following:   MAMMOGRAM    Action needed:   Patient needs referral/order: mammogram    Type of outreach:    Sent [employer] message.    Questions for provider review:    None                                                                                                                                    Nathalie Penn MA       Chart routed to  .

## 2018-03-26 NOTE — DISCHARGE INSTRUCTIONS

## 2018-03-27 ENCOUNTER — INFUSION THERAPY VISIT (OUTPATIENT)
Dept: ONCOLOGY | Facility: CLINIC | Age: 63
End: 2018-03-27
Attending: INTERNAL MEDICINE
Payer: COMMERCIAL

## 2018-03-27 VITALS
HEIGHT: 62 IN | OXYGEN SATURATION: 97 % | HEART RATE: 73 BPM | TEMPERATURE: 97.8 F | WEIGHT: 173.9 LBS | DIASTOLIC BLOOD PRESSURE: 73 MMHG | RESPIRATION RATE: 16 BRPM | BODY MASS INDEX: 32 KG/M2 | SYSTOLIC BLOOD PRESSURE: 108 MMHG

## 2018-03-27 DIAGNOSIS — C50.912 MALIGNANT NEOPLASM OF LEFT FEMALE BREAST, UNSPECIFIED ESTROGEN RECEPTOR STATUS, UNSPECIFIED SITE OF BREAST (H): ICD-10-CM

## 2018-03-27 DIAGNOSIS — C50.912 MALIGNANT NEOPLASM OF LEFT FEMALE BREAST, UNSPECIFIED ESTROGEN RECEPTOR STATUS, UNSPECIFIED SITE OF BREAST (H): Primary | ICD-10-CM

## 2018-03-27 LAB
ALBUMIN SERPL-MCNC: 3.1 G/DL (ref 3.4–5)
ALP SERPL-CCNC: 34 U/L (ref 40–150)
ALT SERPL W P-5'-P-CCNC: 17 U/L (ref 0–50)
ANION GAP SERPL CALCULATED.3IONS-SCNC: 6 MMOL/L (ref 3–14)
AST SERPL W P-5'-P-CCNC: 16 U/L (ref 0–45)
BASOPHILS # BLD AUTO: 0 10E9/L (ref 0–0.2)
BASOPHILS NFR BLD AUTO: 0.7 %
BILIRUB SERPL-MCNC: 0.2 MG/DL (ref 0.2–1.3)
BUN SERPL-MCNC: 11 MG/DL (ref 7–30)
CALCIUM SERPL-MCNC: 8.5 MG/DL (ref 8.5–10.1)
CANCER AG27-29 SERPL-ACNC: 11 U/ML (ref 0–39)
CEA SERPL-MCNC: 0.5 UG/L (ref 0–2.5)
CHLORIDE SERPL-SCNC: 110 MMOL/L (ref 94–109)
CO2 SERPL-SCNC: 28 MMOL/L (ref 20–32)
CREAT SERPL-MCNC: 0.72 MG/DL (ref 0.52–1.04)
DIFFERENTIAL METHOD BLD: ABNORMAL
EOSINOPHIL # BLD AUTO: 0.3 10E9/L (ref 0–0.7)
EOSINOPHIL NFR BLD AUTO: 4.4 %
ERYTHROCYTE [DISTWIDTH] IN BLOOD BY AUTOMATED COUNT: 13.5 % (ref 10–15)
GFR SERPL CREATININE-BSD FRML MDRD: 82 ML/MIN/1.7M2
GLUCOSE SERPL-MCNC: 85 MG/DL (ref 70–99)
HCT VFR BLD AUTO: 35.7 % (ref 35–47)
HGB BLD-MCNC: 11.5 G/DL (ref 11.7–15.7)
IMM GRANULOCYTES # BLD: 0 10E9/L (ref 0–0.4)
IMM GRANULOCYTES NFR BLD: 0.2 %
LYMPHOCYTES # BLD AUTO: 2.3 10E9/L (ref 0.8–5.3)
LYMPHOCYTES NFR BLD AUTO: 37.2 %
MCH RBC QN AUTO: 30.7 PG (ref 26.5–33)
MCHC RBC AUTO-ENTMCNC: 32.2 G/DL (ref 31.5–36.5)
MCV RBC AUTO: 95 FL (ref 78–100)
MONOCYTES # BLD AUTO: 0.4 10E9/L (ref 0–1.3)
MONOCYTES NFR BLD AUTO: 6.9 %
NEUTROPHILS # BLD AUTO: 3.1 10E9/L (ref 1.6–8.3)
NEUTROPHILS NFR BLD AUTO: 50.6 %
NRBC # BLD AUTO: 0 10*3/UL
NRBC BLD AUTO-RTO: 0 /100
PLATELET # BLD AUTO: 201 10E9/L (ref 150–450)
POTASSIUM SERPL-SCNC: 4.1 MMOL/L (ref 3.4–5.3)
PROT SERPL-MCNC: 7 G/DL (ref 6.8–8.8)
RBC # BLD AUTO: 3.75 10E12/L (ref 3.8–5.2)
SODIUM SERPL-SCNC: 143 MMOL/L (ref 133–144)
WBC # BLD AUTO: 6.1 10E9/L (ref 4–11)

## 2018-03-27 PROCEDURE — 96413 CHEMO IV INFUSION 1 HR: CPT

## 2018-03-27 PROCEDURE — 25000128 H RX IP 250 OP 636: Mod: ZF | Performed by: INTERNAL MEDICINE

## 2018-03-27 PROCEDURE — T1013 SIGN LANG/ORAL INTERPRETER: HCPCS | Mod: U3

## 2018-03-27 PROCEDURE — 82378 CARCINOEMBRYONIC ANTIGEN: CPT | Performed by: INTERNAL MEDICINE

## 2018-03-27 PROCEDURE — 85025 COMPLETE CBC W/AUTO DIFF WBC: CPT | Performed by: INTERNAL MEDICINE

## 2018-03-27 PROCEDURE — 80053 COMPREHEN METABOLIC PANEL: CPT | Performed by: INTERNAL MEDICINE

## 2018-03-27 PROCEDURE — 99214 OFFICE O/P EST MOD 30 MIN: CPT | Mod: ZP | Performed by: INTERNAL MEDICINE

## 2018-03-27 PROCEDURE — 86300 IMMUNOASSAY TUMOR CA 15-3: CPT | Performed by: INTERNAL MEDICINE

## 2018-03-27 RX ORDER — ALBUTEROL SULFATE 90 UG/1
1-2 AEROSOL, METERED RESPIRATORY (INHALATION)
Status: CANCELLED
Start: 2018-03-29

## 2018-03-27 RX ORDER — EPINEPHRINE 0.3 MG/.3ML
0.3 INJECTION SUBCUTANEOUS EVERY 5 MIN PRN
Status: CANCELLED | OUTPATIENT
Start: 2018-03-29

## 2018-03-27 RX ORDER — ALBUTEROL SULFATE 0.83 MG/ML
2.5 SOLUTION RESPIRATORY (INHALATION)
Status: CANCELLED | OUTPATIENT
Start: 2018-03-29

## 2018-03-27 RX ORDER — ACETAMINOPHEN 325 MG/1
650 TABLET ORAL
Status: CANCELLED | OUTPATIENT
Start: 2018-03-29

## 2018-03-27 RX ORDER — DIPHENHYDRAMINE HCL 25 MG
50 CAPSULE ORAL ONCE
Status: CANCELLED
Start: 2018-03-29

## 2018-03-27 RX ORDER — HEPARIN SODIUM (PORCINE) LOCK FLUSH IV SOLN 100 UNIT/ML 100 UNIT/ML
5 SOLUTION INTRAVENOUS EVERY 8 HOURS
Status: DISCONTINUED | OUTPATIENT
Start: 2018-03-27 | End: 2018-03-27 | Stop reason: HOSPADM

## 2018-03-27 RX ORDER — HEPARIN SODIUM (PORCINE) LOCK FLUSH IV SOLN 100 UNIT/ML 100 UNIT/ML
5 SOLUTION INTRAVENOUS ONCE
Status: COMPLETED | OUTPATIENT
Start: 2018-03-27 | End: 2018-03-27

## 2018-03-27 RX ORDER — LORAZEPAM 2 MG/ML
0.5 INJECTION INTRAMUSCULAR EVERY 4 HOURS PRN
Status: CANCELLED
Start: 2018-03-29

## 2018-03-27 RX ORDER — METHYLPREDNISOLONE SODIUM SUCCINATE 125 MG/2ML
125 INJECTION, POWDER, LYOPHILIZED, FOR SOLUTION INTRAMUSCULAR; INTRAVENOUS
Status: CANCELLED
Start: 2018-03-29

## 2018-03-27 RX ORDER — SODIUM CHLORIDE 9 MG/ML
1000 INJECTION, SOLUTION INTRAVENOUS CONTINUOUS PRN
Status: CANCELLED
Start: 2018-03-29

## 2018-03-27 RX ORDER — MEPERIDINE HYDROCHLORIDE 25 MG/ML
25 INJECTION INTRAMUSCULAR; INTRAVENOUS; SUBCUTANEOUS EVERY 30 MIN PRN
Status: CANCELLED | OUTPATIENT
Start: 2018-03-29

## 2018-03-27 RX ORDER — EPINEPHRINE 1 MG/ML
0.3 INJECTION, SOLUTION, CONCENTRATE INTRAVENOUS EVERY 5 MIN PRN
Status: CANCELLED | OUTPATIENT
Start: 2018-03-29

## 2018-03-27 RX ORDER — DIPHENHYDRAMINE HYDROCHLORIDE 50 MG/ML
50 INJECTION INTRAMUSCULAR; INTRAVENOUS
Status: CANCELLED
Start: 2018-03-29

## 2018-03-27 RX ORDER — HEPARIN SODIUM (PORCINE) LOCK FLUSH IV SOLN 100 UNIT/ML 100 UNIT/ML
5 SOLUTION INTRAVENOUS EVERY 8 HOURS
Status: CANCELLED | OUTPATIENT
Start: 2018-03-29

## 2018-03-27 RX ADMIN — SODIUM CHLORIDE, PRESERVATIVE FREE 5 ML: 5 INJECTION INTRAVENOUS at 10:33

## 2018-03-27 RX ADMIN — SODIUM CHLORIDE 250 ML: 9 INJECTION, SOLUTION INTRAVENOUS at 09:24

## 2018-03-27 RX ADMIN — TRASTUZUMAB 450 MG: 150 INJECTION, POWDER, LYOPHILIZED, FOR SOLUTION INTRAVENOUS at 10:00

## 2018-03-27 RX ADMIN — SODIUM CHLORIDE, PRESERVATIVE FREE 5 ML: 5 INJECTION INTRAVENOUS at 07:58

## 2018-03-27 ASSESSMENT — PAIN SCALES - GENERAL: PAINLEVEL: NO PAIN (0)

## 2018-03-27 NOTE — MR AVS SNAPSHOT
After Visit Summary   3/27/2018    Hoda Brush    MRN: [id number]           Patient Information     Date Of Birth          1955        Visit Information        Provider Department      3/27/2018 9:30 AM Sheeba Cifuentes;  22 ATC;  ONCOLOGY INFUSION  Services Department        Today's Diagnoses     Malignant neoplasm of left female breast, unspecified estrogen receptor status, unspecified site of breast (H)    -  1      Care Instructions    Contact Numbers    [facility] Main Line: [phone]  [facility] Triage:  [phone]    Call triage with chills and/or temperature greater than or equal to 100.4, uncontrolled nausea/vomiting, diarrhea, constipation, dizziness, shortness of breath, chest pain, bleeding, unexplained bruising, or any new/concerning symptoms, questions/concerns.     If you are having any concerning symptoms or wish to speak to a provider before your next infusion visit, please call your care coordinator or triage to notify them so we can adequately serve you.             March 2018 Sunday Monday Tuesday Wednesday Thursday Friday Saturday                       1     2     3       4     5     6     [facility] MASONIC LAB DRAW    8:15 AM   (60 min.)   [facility]   [facility] Lab Draw     [facility] RETURN    8:35 AM   (90 min.)   Jossie Sparrow PA   [facility] ONC INFUSION 60   10:00 AM   (60 min.)    ONCOLOGY INFUSION   [facility] 7     8     9     10       11     12     13     14     15     16     17       18     19     20     21     22     23     24       25     26     ECH COMPLETE    9:00 AM   (75 min.)   ECHCR2   [facility] Echo     CT CHEST/ABDOMEN/PELVIS W   10:05 AM   (60 min.)   UCCT2   [facility] CT 27     [facility] LAB DRAW    8:00 AM   (15 min.)    [facility]ONIC LAB DRAW   [facility] Lab Draw     [facility] RETURN    8:15 AM   (75 min.)   Lisandro Aguiar MD   [facility] ONC INFUSION 60    9:30 AM   (60 min.)    ONCOLOGY INFUSION   [facility] 28     29     30     31 April 2018 Sunday Monday Tuesday Wednesday Thursday Friday Saturday   1     2     3     4     5     6     7       8     9     10     11     12     13     14       15     16     17     [facility] MASONIC LAB DRAW    8:45 AM   (15 min.)   [facility] DRAW   [facility] Lab Draw     [facility] RETURN    9:25 AM   (50 min.)   Jossie Sparrow PA   [facility] ONC INFUSION 60   10:30 AM   (60 min.)   [facility] ONCOLOGY INFUSION   [facility] 18     19     20     21       22     23     24     25     26     27     28       29     30                                           Lab Results:  Recent Results (from the past 12 hour(s))   CBC with platelets differential    Collection Time: 03/27/18  8:02 AM   Result Value Ref Range    WBC 6.1 4.0 - 11.0 10e9/L    RBC Count 3.75 (L) 3.8 - 5.2 10e12/L    Hemoglobin 11.5 (L) 11.7 - 15.7 g/dL    Hematocrit 35.7 35.0 - 47.0 %    MCV 95 78 - 100 fl    MCH 30.7 26.5 - 33.0 pg    MCHC 32.2 31.5 - 36.5 g/dL    RDW 13.5 10.0 - 15.0 %    Platelet Count 201 150 - 450 10e9/L    Diff Method Automated Method     % Neutrophils 50.6 %    % Lymphocytes 37.2 %    % Monocytes 6.9 %    % Eosinophils 4.4 %    % Basophils 0.7 %    % Immature Granulocytes 0.2 %    Nucleated RBCs 0 0 /100    Absolute Neutrophil 3.1 1.6 - 8.3 10e9/L    Absolute Lymphocytes 2.3 0.8 - 5.3 10e9/L    Absolute Monocytes 0.4 0.0 - 1.3 10e9/L    Absolute Eosinophils 0.3 0.0 - 0.7 10e9/L    Absolute Basophils 0.0 0.0 - 0.2 10e9/L    Abs Immature Granulocytes 0.0 0 - 0.4 10e9/L    Absolute Nucleated RBC 0.0    Comprehensive metabolic panel    Collection Time: 03/27/18  8:02 AM   Result Value Ref Range    Sodium 143 133 - 144 mmol/L    Potassium 4.1 3.4 - 5.3 mmol/L    Chloride 110 (H) 94 - 109 mmol/L    Carbon Dioxide 28 20 - 32 mmol/L    Anion Gap  6 3 - 14 mmol/L    Glucose 85 70 - 99 mg/dL    Urea Nitrogen 11 7 - 30 mg/dL    Creatinine 0.72 0.52 - 1.04 mg/dL    GFR Estimate 82 >60 mL/min/1.7m2    GFR Estimate If Black >90 >60 mL/min/1.7m2    Calcium 8.5 8.5 - 10.1 mg/dL    Bilirubin Total 0.2 0.2 - 1.3 mg/dL    Albumin 3.1 (L) 3.4 - 5.0 g/dL    Protein Total 7.0 6.8 - 8.8 g/dL    Alkaline Phosphatase 34 (L) 40 - 150 U/L    ALT 17 0 - 50 U/L    AST 16 0 - 45 U/L   CA 27.29 Breast tumor marker    Collection Time: 03/27/18  8:02 AM   Result Value Ref Range    CA 27-29 11 0 - 39 U/mL   CEA    Collection Time: 03/27/18  8:02 AM   Result Value Ref Range    CEA 0.5 0 - 2.5 ug/L             Follow-ups after your visit        Your next 10 appointments already scheduled     Apr 17, 2018  8:45 AM CDT   [facility] Lab Draw with  [facility] LAB DRAW   [facility]    [address] [place] [address]  [address]  [facility] [address]   [phone]            Apr 17, 2018  9:40 AM CDT   (Arrive by 9:25 AM)   Return Visit with SEMAJ Eddy   [facility] ([facility])    [address] [place] [address]  [address]  [facility] [address]   [phone]            Apr 17, 2018 10:30 AM CDT   Infusion 60 with UC ONCOLOGY INFUSION, UC 15 ATC   [facility] ([facility])    [address] [place] [address]  [address]  [facility] [address]   [phone]            May 08, 2018  8:15 AM CDT   [facility] Lab Draw with  MASONIC LAB DRAW   [facility]    [address] [place] [address]  [address]  [facility] [address]   [phone]            May 08, 2018  8:50 AM CDT   (Arrive by 8:35 AM)   Return Visit with SEMAJ Eddy   [facility] ([facility])    [address]  [address]  [facility] [address] [phone]            May 08, 2018 10:00 AM CDT   Infusion  60 with  ONCOLOGY INFUSION, UC 29 ATC   [facility] ([facility])    [address]  [address]  [facility] [address] [phone]            May 29, 2018  8:15 AM CDT   [facility] Lab Draw with  MASONIC LAB DRAW   [facility] Lab Draw ([facility])    [address]  [address]  [facility] [address] [phone]            May 29, 2018  8:50 AM CDT   (Arrive by 8:35 AM)   Return Visit with SEMAJ Eddy   [facility] ([facility])    [address]  [address]  [facility] [address] [phone]              Who to contact     If you have questions or need follow up information about today's clinic visit or your schedule please contact [facility] CANCER [facility] directly at [phone].  Normal or non-critical lab and imaging results will be communicated to you by [employer], letter or phone within 4 business days after the clinic has received the results. If you do not hear from us within 7 days, please contact the clinic through [employer] or phone. If you have a critical or abnormal lab result, we will notify you by phone as soon as possible.  Submit refill requests through [employer] or call your pharmacy and they will forward the refill request to us. Please allow 3 business days for your refill to be completed.          Additional Information About Your Visit        [employer] Information     [employer] gives you secure access to your electronic health record. If you see a primary care provider, you can also send messages to your care team and make appointments. If you have questions, please call your primary care clinic.  If you do not have a primary care provider, please call [phone] and they will assist you.        Care EveryWhere ID     This is your Care EveryWhere ID. This could be used by other organizations to access your [facility]  records  [id number]         Blood Pressure from Last 3 Encounters:   03/27/18 108/73   03/06/18 102/60   02/16/18 120/66    Weight from Last 3 Encounters:   03/27/18 78.9 kg (173 lb 14.4 oz)   03/06/18 78 kg (172 lb)   02/13/18 79.7 kg (175 lb 12.8 oz)              We Performed the Following     CA 27.29 Breast tumor marker     CBC with platelets differential     CEA     Comprehensive metabolic panel        Primary Care Provider Fax #    Physician No Ref-Primary [phone]       No address on file        Equal Access to Services     [facility] : Hadii kamran Ramsey, wamikki jefferson, jamal wilikns, alexa angulo . [facility] [phone].    ATENCIÓN: Si habla español, tiene a lagunas disposición servicios gratuitos de asistencia lingüística. Llame al [phone].    We comply with applicable federal civil rights laws and [place] laws. We do not discriminate on the basis of race, color, national origin, age, disability, sex, sexual orientation, or gender identity.            Thank you!     Thank you for choosing [facility]ER CL[facility]  for your care. Our goal is always to provide you with excellent care. Hearing back from our patients is one way we can continue to improve our services. Please take a few minutes to complete the written survey that you may receive in the mail after your visit with us. Thank you!             Your Updated Medication List - Protect others around you: Learn how to safely use, store and throw away your medicines at www.disposemymeds.org.          This list is accurate as of 3/27/18 10:14 AM.  Always use your most recent med list.                   Brand Name Dispense Instructions for use Diagnosis    albuterol 108 (90 BASE) MCG/ACT Inhaler    PROAIR HFA/PROVENTIL HFA/VENTOLIN HFA    1 Inhaler    Inhale 2 puffs into the lungs every 6 hours as needed for shortness of breath / dyspnea or wheezing    Cough       benzonatate 200 MG  capsule    TESSALON    21 capsule    Take 1 capsule (200 mg) by mouth 3 times daily as needed for cough    Cough       cephALEXin 500 MG capsule    KEFLEX    56 capsule    Take 1 capsule (500 mg) by mouth every 6 hours    Cellulitis of left upper extremity       guaiFENesin-codeine 100-10 MG/5ML Soln solution    ROBITUSSIN AC    240 mL    Take 5-10 mLs by mouth every 6 hours as needed for cough    Cough       order for DME     1 each    L UE class 2 compression sleeve and gauntlet Night garment Bandaging supplies    Lymphedema of left upper extremity       tamoxifen 20 MG tablet    NOLVADEX    90 tablet    Take 1 tablet (20 mg) by mouth daily    Cancer of central portion of left breast (H)       VITAMIN D (CHOLECALCIFEROL) PO      Take 1,000 Units by mouth daily

## 2018-03-27 NOTE — PROGRESS NOTES
Infusion Nursing Note:  Hoda KIMBLE Gonsalo presents today for Day 1 Cycle 10 Herceptin    Patient seen by provider today: Yes: Dr Aguiar   present during visit today: Yes Russian    Note: N/A.    Intravenous Access:  Implanted Port.    Treatment Conditions:  Lab Results   Component Value Date    HGB 11.5 03/27/2018     Lab Results   Component Value Date    WBC 6.1 03/27/2018      Lab Results   Component Value Date    ANEU 3.1 03/27/2018     Lab Results   Component Value Date     03/27/2018      Lab Results   Component Value Date     03/27/2018                   Lab Results   Component Value Date    POTASSIUM 4.1 03/27/2018           No results found for: MAG         Lab Results   Component Value Date    CR 0.72 03/27/2018                   Lab Results   Component Value Date    TAYLER 8.5 03/27/2018                Lab Results   Component Value Date    BILITOTAL 0.2 03/27/2018           Lab Results   Component Value Date    ALBUMIN 3.1 03/27/2018                    Lab Results   Component Value Date    ALT 17 03/27/2018           Lab Results   Component Value Date    AST 16 03/27/2018       Results reviewed, labs MET treatment parameters, ok to proceed with treatment.  ECHO/MUGA completed 3/26  EF 60-65%.      Post Infusion Assessment:  Patient tolerated infusion without incident.  Blood return noted pre and post infusion.  Site patent and intact, free from redness, edema or discomfort.  No evidence of extravasations.  Access discontinued per protocol.    Discharge Plan:   Patient declined prescription refills.  Discharge instructions reviewed with: Patient with .  Patient and/or family verbalized understanding of discharge instructions and all questions answered.  Copy of AVS reviewed with patient and/or family.  Patient will return 3/17 for next appointment.  Patient discharged in stable condition accompanied by: self and .  Departure Mode: Ambulatory.    Rosa LUNSFORD  Eron, RN

## 2018-03-27 NOTE — NURSING NOTE
Chief Complaint   Patient presents with     Port Draw     labs dran via port by RN     /73 (BP Location: Right arm, Patient Position: Chair, Cuff Size: Adult Large)  Pulse 73  Temp 97.8  F (36.6  C) (Oral)  Wt 78.9 kg (173 lb 14.4 oz)  SpO2 97%  BMI 31.8 kg/m2    Vitals taken. Port accessed by RN. Labs collected and sent. Line flushed with NS & Heparin. Pt tolerated well. Pt checked in for next appointment.    Marlene Alonso RN

## 2018-03-27 NOTE — PATIENT INSTRUCTIONS
Contact Numbers    [facility] Main Line: [phone]  [facility] Triage:  [phone]    Call triage with chills and/or temperature greater than or equal to 100.4, uncontrolled nausea/vomiting, diarrhea, constipation, dizziness, shortness of breath, chest pain, bleeding, unexplained bruising, or any new/concerning symptoms, questions/concerns.     If you are having any concerning symptoms or wish to speak to a provider before your next infusion visit, please call your care coordinator or triage to notify them so we can adequately serve you.             March 2018 Sunday Monday Tuesday Wednesday Thursday Friday Saturday                       1     2     3       4     5     6     [facility] DRAW    8:15 AM   (60 min.)    [facility]   [facility]     [facility] RETURN    8:35 AM   (90 min.)   Jossie Sparrow PA   [facility]     [facility] ONC INFUSION 60   10:00 AM   (60 min.)    ONCOLOGY INFUSION   [facility] 7     8     9     10       11     12     13     14     15     16     17       18     19     20     21     22     23     24       25     26     [facility] COMPLETE    9:00 AM   (75 min.)   [facility] [place] [address] Echo     CT CHEST/ABDOMEN/PELVIS W   10:05 AM   (60 min.)   UCCT2   [facility] CT 27     [facility] DRAW    8:00 AM   (15 min.)    [facility]     [facility] RETURN    8:15 AM   (75 min.)   Lisandro Aguiar MD   [facility]     [facility] ONC INFUSION 60    9:30 AM   (60 min.)    ONCOLOGY INFUSION   [facility] 28     29     30     31 April 2018 Sunday Monday Tuesday Wednesday Thursday Friday Saturday   1     2     3     4     5     6     7       8     9     10     11     12     13     14       15     16     17     [facility] DRAW    8:45 AM   (15 min.)    [facility]RAW   [facility]     [facility] RETURN    9:25 AM   (50 min.)   Jossie Sparrow,  PA   [facility] 60   10:30 AM   (60 min.)   [facility] ONCOLOGY INFUSION   [facility] 18     19     20     21       22     23     24     25     26     27     28       29     30                                           Lab Results:  Recent Results (from the past 12 hour(s))   CBC with platelets differential    Collection Time: 03/27/18  8:02 AM   Result Value Ref Range    WBC 6.1 4.0 - 11.0 10e9/L    RBC Count 3.75 (L) 3.8 - 5.2 10e12/L    Hemoglobin 11.5 (L) 11.7 - 15.7 g/dL    Hematocrit 35.7 35.0 - 47.0 %    MCV 95 78 - 100 fl    MCH 30.7 26.5 - 33.0 pg    MCHC 32.2 31.5 - 36.5 g/dL    RDW 13.5 10.0 - 15.0 %    Platelet Count 201 150 - 450 10e9/L    Diff Method Automated Method     % Neutrophils 50.6 %    % Lymphocytes 37.2 %    % Monocytes 6.9 %    % Eosinophils 4.4 %    % Basophils 0.7 %    % Immature Granulocytes 0.2 %    Nucleated RBCs 0 0 /100    Absolute Neutrophil 3.1 1.6 - 8.3 10e9/L    Absolute Lymphocytes 2.3 0.8 - 5.3 10e9/L    Absolute Monocytes 0.4 0.0 - 1.3 10e9/L    Absolute Eosinophils 0.3 0.0 - 0.7 10e9/L    Absolute Basophils 0.0 0.0 - 0.2 10e9/L    Abs Immature Granulocytes 0.0 0 - 0.4 10e9/L    Absolute Nucleated RBC 0.0    Comprehensive metabolic panel    Collection Time: 03/27/18  8:02 AM   Result Value Ref Range    Sodium 143 133 - 144 mmol/L    Potassium 4.1 3.4 - 5.3 mmol/L    Chloride 110 (H) 94 - 109 mmol/L    Carbon Dioxide 28 20 - 32 mmol/L    Anion Gap 6 3 - 14 mmol/L    Glucose 85 70 - 99 mg/dL    Urea Nitrogen 11 7 - 30 mg/dL    Creatinine 0.72 0.52 - 1.04 mg/dL    GFR Estimate 82 >60 mL/min/1.7m2    GFR Estimate If Black >90 >60 mL/min/1.7m2    Calcium 8.5 8.5 - 10.1 mg/dL    Bilirubin Total 0.2 0.2 - 1.3 mg/dL    Albumin 3.1 (L) 3.4 - 5.0 g/dL    Protein Total 7.0 6.8 - 8.8 g/dL    Alkaline Phosphatase 34 (L) 40 - 150 U/L    ALT 17 0 - 50 U/L    AST 16 0 - 45 U/L   CA 27.29 Breast tumor marker    Collection Time: 03/27/18  8:02  AM   Result Value Ref Range    CA 27-29 11 0 - 39 U/mL   CEA    Collection Time: 03/27/18  8:02 AM   Result Value Ref Range    CEA 0.5 0 - 2.5 ug/L

## 2018-03-27 NOTE — MR AVS SNAPSHOT
After Visit Summary   3/27/2018    Hoda Brush    MRN: [id number]           Patient Information     Date Of Birth          1955        Visit Information        Provider Department      3/27/2018 8:15 AM Sheeba Cifuentes; Lisandro Aguiar MD [facility]        Today's Diagnoses     Malignant neoplasm of left female breast, unspecified estrogen receptor status, unspecified site of breast (H)           Follow-ups after your visit        Your next 10 appointments already scheduled     Apr 17, 2018  8:45 AM CDT   [facility] Lab Draw with [facility] LAB DRAW   [facility] Draw ([facility])    [address]  [address]  [facility] [address]   [phone]            Apr 17, 2018  9:40 AM CDT   (Arrive by 9:25 AM)   Return Visit with SEMAJ Eddy   [facility] ([facility])    [address] [place] [address]  [address]  [facility] [address]   [phone]            Apr 17, 2018 10:30 AM CDT   Infusion 60 with UC ONCOLOGY INFUSION, UC 15 ATC   [facility] ([facility])    [address] [place] [address]  [address]  [facility] [address]   [phone]            May 08, 2018  8:15 AM CDT   M[facility]ic Lab Draw with [facility] LAB DRAW   [facility] Draw ([facility])    [address]  [address]  [facility] [address]   [phone]            May 08, 2018  8:50 AM CDT   (Arrive by 8:35 AM)   Return Visit with SEMAJ Eddy   [facility] ([facility])    [address] [place] [address]  [address]  [facility] [address]   [phone]            May 08, 2018 10:00 AM CDT   Infusion 60 with UC ONCOLOGY INFUSION, UC 29 ATC   [facility] ([facility])    [address] [place] [address]  [address]  [facility] [address]   [phone]     "        May 29, 2018  8:15 AM CDT   Masonic Lab Draw with  MASONIC LAB DRAW   [facility] Lab Draw ([facility])    [address]  [address]  [facility] [address] [phone]            May 29, 2018  8:50 AM CDT   (Arrive by 8:35 AM)   Return Visit with SEMAJ Eddy   [facility] ([facility])    [address]  [address]  [facility] [address] [phone]              Who to contact     If you have questions or need follow up information about today's clinic visit or your schedule please contact [facility] directly at [phone].  Normal or non-critical lab and imaging results will be communicated to you by MyChart, letter or phone within 4 business days after the clinic has received the results. If you do not hear from us within 7 days, please contact the clinic through Flapsharehart or phone. If you have a critical or abnormal lab result, we will notify you by phone as soon as possible.  Submit refill requests through [employer]Bl[employer] or call your pharmacy and they will forward the refill request to us. Please allow 3 business days for your refill to be completed.          Additional Information About Your Visit        FrameBlast Information     [employer]ast gives you secure access to your electronic health record. If you see a primary care provider, you can also send messages to your care team and make appointments. If you have questions, please call your primary care clinic.  If you do not have a primary care provider, please call [phone] and they will assist you.        Care EveryWhere ID     This is your Care EveryWhere ID. This could be used by other organizations to access your [facility] medical records  [id number]        Your Vitals Were     Pulse Temperature Respirations Height Pulse Oximetry BMI (Body Mass Index)    73 97.8  F (36.6  C) (Oral) 16 1.575 m (5' 2.01\") 97% 31.8 kg/m2       Blood " Pressure from Last 3 Encounters:   03/27/18 108/73   03/06/18 102/60   02/16/18 120/66    Weight from Last 3 Encounters:   03/27/18 78.9 kg (173 lb 14.4 oz)   03/06/18 78 kg (172 lb)   02/13/18 79.7 kg (175 lb 12.8 oz)              We Performed the Following     Mammogram - HIM Scan        Primary Care Provider Fax #    Physician No Ref-Primary [phone]       No address on file        Equal Access to Services     NOE VAZQUEZ : Hadii kamran ku hadasho Soomaali, waaxda luqadaha, qaybta kaalmada adeegyada, alexa angulo . So [facility] [phone].    ATENCIÓN: Si habla español, tiene a lagunas disposición servicios gratuitos de asistencia lingüística. Llame al [phone].    We comply with applicable federal civil rights laws and [place] laws. We do not discriminate on the basis of race, color, national origin, age, disability, sex, sexual orientation, or gender identity.            Thank you!     Thank you for choosing [facility]  for your care. Our goal is always to provide you with excellent care. Hearing back from our patients is one way we can continue to improve our services. Please take a few minutes to complete the written survey that you may receive in the mail after your visit with us. Thank you!             Your Updated Medication List - Protect others around you: Learn how to safely use, store and throw away your medicines at www.disposemymeds.org.          This list is accurate as of 3/27/18 11:59 PM.  Always use your most recent med list.                   Brand Name Dispense Instructions for use Diagnosis    albuterol 108 (90 BASE) MCG/ACT Inhaler    PROAIR HFA/PROVENTIL HFA/VENTOLIN HFA    1 Inhaler    Inhale 2 puffs into the lungs every 6 hours as needed for shortness of breath / dyspnea or wheezing    Cough       benzonatate 200 MG capsule    TESSALON    21 capsule    Take 1 capsule (200 mg) by mouth 3 times daily as needed for cough    Cough        cephALEXin 500 MG capsule    KEFLEX    56 capsule    Take 1 capsule (500 mg) by mouth every 6 hours    Cellulitis of left upper extremity       guaiFENesin-codeine 100-10 MG/5ML Soln solution    ROBITUSSIN AC    240 mL    Take 5-10 mLs by mouth every 6 hours as needed for cough    Cough       order for DME     1 each    L UE class 2 compression sleeve and gauntlet Night garment Bandaging supplies    Lymphedema of left upper extremity       tamoxifen 20 MG tablet    NOLVADEX    90 tablet    Take 1 tablet (20 mg) by mouth daily    Cancer of central portion of left breast (H)       VITAMIN D (CHOLECALCIFEROL) PO      Take 1,000 Units by mouth daily

## 2018-03-27 NOTE — NURSING NOTE
"Oncology Rooming Note    March 27, 2018 8:18 AM   Hoda Brush is a 63 year old female who presents for:    Chief Complaint   Patient presents with     Port Draw     labs dran via port by RN     Oncology Clinic Visit     Return: Breast CA     Initial Vitals: /73 (BP Location: Right arm, Patient Position: Chair, Cuff Size: Adult Large)  Pulse 73  Temp 97.8  F (36.6  C) (Oral)  Resp 16  Ht 1.575 m (5' 2.01\")  Wt 78.9 kg (173 lb 14.4 oz)  SpO2 97%  BMI 31.8 kg/m2 Estimated body mass index is 31.8 kg/(m^2) as calculated from the following:    Height as of this encounter: 1.575 m (5' 2.01\").    Weight as of this encounter: 78.9 kg (173 lb 14.4 oz). Body surface area is 1.86 meters squared.  No Pain (0) Comment: Data Unavailable   No LMP recorded. Patient is postmenopausal.  Allergies reviewed: YES  Medications reviewed: YES    Medications: Medication refills not needed today.  Pharmacy name entered into [place] [employer]:    [id number] - [place], [place] - [address] [id number] - [place], [place] - [address] AT [facility] & [address] [place] [address] DRUG STORE [id number] - [place], [place] - [address] AT  [address] [facility]    Clinical concerns: no new concerns.  Pt received flu shot elsewhere. See Immunizations   Updated pt health maintenance on Mammogram . Pt has had it done in 2015     6 minutes for nursing intake (face to face time)     Bouchra Guerrero CMA                "

## 2018-03-27 NOTE — LETTER
3/27/2018       RE: Hoda Brush  [address] [address]     Dear Colleague,    Thank you for referring your patient, Hoda Brush, to the [facility]. Please see a copy of my visit note below.    Hoda is seen with her daughter, Ashleigh, today for a new patient visit.  The patient is a refugee from [place] [facility] and is here for continuation of care for her metastatic ER+HER2- breast cancer.  She is a [employer] refugee from [place] and was seen in clinic with her daughter.  The only data we have from [facility] is an English translation of her records.       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  On 02/11/2014, she was seen by an oncologist.  The clinical staging of T4b N2 M1 was noted.   Her breast cancer was on the left side. There was no right breast cancer, confirmed by the patient and her daughter, correcting a possible error in the translated records.  ER was positive in 100% of the cells, SC at 14% and HER2 was 3+ positive.  It appears that this histopathologic information is on the mastectomy specimen. She underwent an MRI for staging of presumptive bone metastases which was performed 03/02/2014.  There were skeletal metastases in the thoracic vertebrae at 12, L1, L3, L5 and S1 vertebral body, ranging in size from 0.7-3.0 cm in size.  Ischial and right femur were also involved, as well as a large iliac mass measuring about 15 cm in the uterus. 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   Radiation Oncology consultation was performed 03/11/2014, and she was given radiation in 1 dose of 6 Gy to the right iliac lesion.        With the initial diagnosis, she initiated treatment with 6 cycles of CAF neoadjuvant chemotherapy 02/14, 07/07, 03/28, 04/18/14, 05/08 and 05/29/14. She also received monthly zoledronic acid.  She then underwent a modified left mastectomy of Palacios type and left  lymphadenoectomy on 06/27/2014.   Pathologic examination showed number at [facility] was [id number] showed infiltrative grade 2 ductal cancer with 6 level 1 metastatic lymph nodes and 3 level 2 metastatic lymph nodes.  The differentiation was intermediate.  The tumor was ER positive 70% of the cells, RI positive in 40% of the cells and HER2 was 3+ by immunohistochemistry and the Ki-67 labeling index was 20%. Her staging after surgery was stage IV, pT4b N2 M1.  I don't see a biopsy of the skeletal metastases.  She then had continuation with chemotherapy with 4 cycles of Herceptin and taxane with monthly zoledronic acid.  Tamoxifen was initiated.  She then had two years of Herceptin and a decision was made not to continue further Herceptin.  She continued on zoledronic acid every 3 months. She was clinically stable. She then moved to the [place] new refugee from [facility].  She arrived last month, established [place] residency and now seeks further oncology care.      TREATMENT HISTORY:  A. Initial diagnosis with metastatic breast cancer in [facility].  Neoadjuvant CAF x 6.   B. Left mastectomy. Left axillary node dissection.  C.  Radiation in 1 dose to R iliac region. g Gy.  C. Herceptin for 2 years taxane for a prescribed course then stopped, monthly zoledronic acid.  She had 2 years of Herceptin with tamoxifen added after chemotherapy.  D.  Tamoxifen alone and zoledronic acid every 3 months.  E.  [place]  We restarted Herceptin every 3 weeks and continued tamoxifen. Bone targeted agent changed to denosumab every 9 weeks.     INTERVAL HISTORY:  Hoda returns to clinic and has been doing quite well.  She has no pain, no fatigue, no depression, no anxiety.  A 10-point review of systems is entirely negative.  She continues on tamoxifen.  She has had no problems with uterine bleeding.  Her CT scan today shows stable disease.  She continues with calcium and vitamin D.  She has been eating a healthful diet  and she has been walking for exercise.      PHYSICAL EXAMINATION:   VITAL SIGNS:  Blood pressure 108/73, temperature 97.8, pulse 73, respirations 16, O2 sat 97% on room air, height 1.6 meters and weight 79 kg.   GENERAL:  Hoda appeared generally well.  She has no alopecia.   HEENT:  Oropharynx is without lesions.   LYMPH:  There is no palpable cervical, supraclavicular, subclavicular or axillary lymphadenopathy.   LUNGS:  Clear to percussion and auscultation.   HEART:  There is a regular rate and rhythm, S1, S2.   ABDOMEN:  Soft and nontender, without hepatosplenomegaly.   EXTREMITIES:  Without edema.   PSYCHIATRIC:  Mood and affect were normal.      LABORATORY DATA:  The CBC is remarkable for a hemoglobin of 11.5.  The CMP was within normal limits except for an albumin of 3.1.      ASSESSMENT AND PLAN:   1.  Hoda Brush is a 63-year-old woman with ER+HER2+ breast cancer from [facility], formerly from [facility], who came to live in the .  She lives with her daughter and is here for continuation of every-3-week Herceptin and tamoxifen.  Her tumor is ER positive, ND positive and HER2 positive.  She had metastatic disease at the time of presentation with bone-only metastases by report.  She presented and was diagnosed with metastatic breast cancer approximately 02/2014 when she was diagnosed with a T4N2M1 invasive ductal carcinoma of the left breast.  She underwent neoadjuvant CAF, left mastectomy and left axillary lymph node dissection and then radiation.  She also had radiation to the right iliac region where she has metastatic disease.  Pathology report from [facility] shows ER positive in 75% of the cells, ND positive in 40% of the cells, and HER2/kenia was 3+ positive by immunohistochemistry.  Ki-67 was 20%.  She had no evidence of metastatic disease on her PET CT except for multiple osseous foci of increased radiotracer uptake with metastatic disease.  This PET CT was 08/2017 and was apparently unchanged compared  with the 06/12/2014 exam.    2.  Restaging.  There is also a stable left lower lobe pulmonary nodule which has been essentially unchanged in the followup CT scan performed yesterday.  Her brain MRI shows no evidence of metastasis and we have not repeated this study and will not repeat this study unless there are symptoms.  Our plan is continued follow up with staging CT scans every 12 weeks.   3.  Breast imaging question.  We do not recommend mammography.   4.  Lymphedema referral was placed.   5.  Genetic testing.  Testing was negative.  Hoda is negative for mutations in the DEWEY, BRCA1, BRCA2, CDH1, CHEK2, NBN, NF1, PALB2, PTEN, STK11, and TP53 genes by sequencing and deletion/duplication analysis. No mutations were found in any of the 11 genes analyzed.  6.  Continue Xgeva every 9 weeks.   7.  Followup.  We will see Hoda in followup in our clinic in 3 weeks with KARISSA.   Follow up with KARISSA 4-17, 5-8, 5-29 and with me 6-19 with Herceptin each visit.  CBC, CMP, CA27.29 and CEA with each visit.  Xgeva 4-17 and 5-8 and then every 9 weeks.     Thank you for allowing us to continue to participate in Hoda's care.     Sincerely,    Lisandro Aguiar M.D.      Division of Hematology, Oncology, Transplant   Department of Medicine   [facility]   [phone]     EXAMINATION: CT CHEST/ABDOMEN/PELVIS W CONTRAST, 3/26/2018 10:58 AM     TECHNIQUE:  Helical CT images from the thoracic inlet through the  symphysis pubis were obtained  with contrast. Contrast dose: 105 mL of  Isovue-370.     COMPARISON: PET CTs dated 12/21/2017 and 8/21/2017.     HISTORY: ; Bone metastasis (H); Metastatic breast cancer (H)     FINDINGS:     Chest: Right IJ Vabiwp-n-Care terminates near the atriocaval junction.  The heart is normal in size without pericardial effusion. A small left  axillary lymph nodes with mild haziness of the adjacent fat is stable  and nonenlarged, series 7, image 114. No  pathologically enlarged  axillary, hilar, or mediastinal lymph nodes. Small right  tracheoesophageal lymph node is stable. No enlarged internal mammary  lymph nodes. Postoperative changes of left mastectomy. Stable 5 mm  nodule at the left lung base, series 10, image 95, not significantly  changed since outside PET CT dated 6/12/2014. No new or enlarging  suspicious pulmonary nodules. Mild fibroatelectasis.     Abdomen and pelvis: Trace hypodensity along the falciform ligament  likely secondary to focal hepatic steatosis. No new liver lesions. The  gallbladder is decompressed with cholelithiasis. The spleen, pancreas,  adrenal glands, and kidneys remain within normal limits. The bowel is  without dilatation or adjacent inflammatory change. No pathologically  enlarged lymph nodes in the abdomen or pelvis. Leiomyomatous myomatous  uterus. No suspicious adnexal masses. The bladder is mostly  decompressed.     Bones and soft tissues: Unchanged appearance of the osseous structures  with lytic and sclerotic change involving the acetabulum and iliac  bones, right greater than left with medullary expansion and  trabeculation. Additional sclerotic osseous lesions involving the  visualized femurs, spine, sternum and ribs, grossly unchanged. No  acute fracture.         IMPRESSION:   1.  In this patient with a history of breast carcinoma status post  left mastectomy there is no evidence for disease progression. Stable  appearance of multiple sclerotic axial and appendicular osseous  lesions, predominantly within the spine and ribs, not metabolically  active on the most recent PET CT suggestive of treated metastases.  2. Stable expansile lytic and sclerotic appearance of the iliac bones.  3. Additional incidental findings including leiomyomatous uterus,  cholelithiasis and 5 mm left lower lobe pulmonary nodule stable since  at least 2014 are again noted.     BACILIO SCHAEFER MD  I spent 30 minutes with the patient more than 50%  of which was in counseling and coordination of care.     Again, thank you for allowing me to participate in the care of your patient.      Sincerely,    Lisandro Aguiar MD

## 2018-04-17 ENCOUNTER — INFUSION THERAPY VISIT (OUTPATIENT)
Dept: ONCOLOGY | Facility: CLINIC | Age: 63
End: 2018-04-17
Attending: INTERNAL MEDICINE
Payer: COMMERCIAL

## 2018-04-17 VITALS
RESPIRATION RATE: 16 BRPM | HEART RATE: 73 BPM | HEIGHT: 62 IN | BODY MASS INDEX: 32.09 KG/M2 | DIASTOLIC BLOOD PRESSURE: 64 MMHG | OXYGEN SATURATION: 99 % | TEMPERATURE: 98 F | SYSTOLIC BLOOD PRESSURE: 115 MMHG | WEIGHT: 174.4 LBS

## 2018-04-17 DIAGNOSIS — C79.51 BONE METASTASIS: ICD-10-CM

## 2018-04-17 DIAGNOSIS — C50.912 MALIGNANT NEOPLASM OF LEFT FEMALE BREAST, UNSPECIFIED ESTROGEN RECEPTOR STATUS, UNSPECIFIED SITE OF BREAST (H): ICD-10-CM

## 2018-04-17 DIAGNOSIS — C50.912 MALIGNANT NEOPLASM OF LEFT FEMALE BREAST, UNSPECIFIED ESTROGEN RECEPTOR STATUS, UNSPECIFIED SITE OF BREAST (H): Primary | ICD-10-CM

## 2018-04-17 LAB
ALBUMIN SERPL-MCNC: 3.2 G/DL (ref 3.4–5)
ALP SERPL-CCNC: 37 U/L (ref 40–150)
ALT SERPL W P-5'-P-CCNC: 17 U/L (ref 0–50)
ANION GAP SERPL CALCULATED.3IONS-SCNC: 6 MMOL/L (ref 3–14)
AST SERPL W P-5'-P-CCNC: 17 U/L (ref 0–45)
BASOPHILS # BLD AUTO: 0.1 10E9/L (ref 0–0.2)
BASOPHILS NFR BLD AUTO: 0.8 %
BILIRUB SERPL-MCNC: 0.3 MG/DL (ref 0.2–1.3)
BUN SERPL-MCNC: 18 MG/DL (ref 7–30)
CALCIUM SERPL-MCNC: 8.6 MG/DL (ref 8.5–10.1)
CANCER AG27-29 SERPL-ACNC: 7 U/ML (ref 0–39)
CEA SERPL-MCNC: 0.6 UG/L (ref 0–2.5)
CHLORIDE SERPL-SCNC: 111 MMOL/L (ref 94–109)
CO2 SERPL-SCNC: 27 MMOL/L (ref 20–32)
CREAT SERPL-MCNC: 0.69 MG/DL (ref 0.52–1.04)
DIFFERENTIAL METHOD BLD: ABNORMAL
EOSINOPHIL # BLD AUTO: 0.2 10E9/L (ref 0–0.7)
EOSINOPHIL NFR BLD AUTO: 3.6 %
ERYTHROCYTE [DISTWIDTH] IN BLOOD BY AUTOMATED COUNT: 13.6 % (ref 10–15)
GFR SERPL CREATININE-BSD FRML MDRD: 86 ML/MIN/1.7M2
GLUCOSE SERPL-MCNC: 76 MG/DL (ref 70–99)
HCT VFR BLD AUTO: 36.2 % (ref 35–47)
HGB BLD-MCNC: 11.4 G/DL (ref 11.7–15.7)
IMM GRANULOCYTES # BLD: 0 10E9/L (ref 0–0.4)
IMM GRANULOCYTES NFR BLD: 0.3 %
LYMPHOCYTES # BLD AUTO: 2.4 10E9/L (ref 0.8–5.3)
LYMPHOCYTES NFR BLD AUTO: 37 %
MCH RBC QN AUTO: 30.4 PG (ref 26.5–33)
MCHC RBC AUTO-ENTMCNC: 31.5 G/DL (ref 31.5–36.5)
MCV RBC AUTO: 97 FL (ref 78–100)
MONOCYTES # BLD AUTO: 0.5 10E9/L (ref 0–1.3)
MONOCYTES NFR BLD AUTO: 6.8 %
NEUTROPHILS # BLD AUTO: 3.4 10E9/L (ref 1.6–8.3)
NEUTROPHILS NFR BLD AUTO: 51.5 %
NRBC # BLD AUTO: 0 10*3/UL
NRBC BLD AUTO-RTO: 0 /100
PLATELET # BLD AUTO: 187 10E9/L (ref 150–450)
POTASSIUM SERPL-SCNC: 4 MMOL/L (ref 3.4–5.3)
PROT SERPL-MCNC: 7.2 G/DL (ref 6.8–8.8)
RBC # BLD AUTO: 3.75 10E12/L (ref 3.8–5.2)
SODIUM SERPL-SCNC: 144 MMOL/L (ref 133–144)
WBC # BLD AUTO: 6.6 10E9/L (ref 4–11)

## 2018-04-17 PROCEDURE — 25000128 H RX IP 250 OP 636: Mod: ZF | Performed by: INTERNAL MEDICINE

## 2018-04-17 PROCEDURE — 85025 COMPLETE CBC W/AUTO DIFF WBC: CPT | Performed by: INTERNAL MEDICINE

## 2018-04-17 PROCEDURE — 86300 IMMUNOASSAY TUMOR CA 15-3: CPT | Performed by: INTERNAL MEDICINE

## 2018-04-17 PROCEDURE — 25000128 H RX IP 250 OP 636: Mod: ZF | Performed by: PHYSICIAN ASSISTANT

## 2018-04-17 PROCEDURE — 80053 COMPREHEN METABOLIC PANEL: CPT | Performed by: INTERNAL MEDICINE

## 2018-04-17 PROCEDURE — 82378 CARCINOEMBRYONIC ANTIGEN: CPT | Performed by: INTERNAL MEDICINE

## 2018-04-17 PROCEDURE — G0463 HOSPITAL OUTPT CLINIC VISIT: HCPCS | Mod: ZF

## 2018-04-17 PROCEDURE — 96413 CHEMO IV INFUSION 1 HR: CPT

## 2018-04-17 PROCEDURE — 96372 THER/PROPH/DIAG INJ SC/IM: CPT | Mod: 59

## 2018-04-17 PROCEDURE — T1013 SIGN LANG/ORAL INTERPRETER: HCPCS | Mod: U3

## 2018-04-17 PROCEDURE — 99214 OFFICE O/P EST MOD 30 MIN: CPT | Mod: ZP | Performed by: PHYSICIAN ASSISTANT

## 2018-04-17 RX ORDER — EPINEPHRINE 0.3 MG/.3ML
0.3 INJECTION SUBCUTANEOUS EVERY 5 MIN PRN
Status: CANCELLED | OUTPATIENT
Start: 2018-05-08

## 2018-04-17 RX ORDER — ALBUTEROL SULFATE 0.83 MG/ML
2.5 SOLUTION RESPIRATORY (INHALATION)
Status: CANCELLED | OUTPATIENT
Start: 2018-04-17

## 2018-04-17 RX ORDER — EPINEPHRINE 1 MG/ML
0.3 INJECTION, SOLUTION, CONCENTRATE INTRAVENOUS EVERY 5 MIN PRN
Status: CANCELLED | OUTPATIENT
Start: 2018-04-17

## 2018-04-17 RX ORDER — DIPHENHYDRAMINE HCL 25 MG
50 CAPSULE ORAL ONCE
Status: CANCELLED
Start: 2018-04-17

## 2018-04-17 RX ORDER — LORAZEPAM 2 MG/ML
0.5 INJECTION INTRAMUSCULAR EVERY 4 HOURS PRN
Status: CANCELLED
Start: 2018-05-08

## 2018-04-17 RX ORDER — ALBUTEROL SULFATE 0.83 MG/ML
2.5 SOLUTION RESPIRATORY (INHALATION)
Status: CANCELLED | OUTPATIENT
Start: 2018-05-08

## 2018-04-17 RX ORDER — MEPERIDINE HYDROCHLORIDE 25 MG/ML
25 INJECTION INTRAMUSCULAR; INTRAVENOUS; SUBCUTANEOUS EVERY 30 MIN PRN
Status: CANCELLED | OUTPATIENT
Start: 2018-04-17

## 2018-04-17 RX ORDER — HEPARIN SODIUM (PORCINE) LOCK FLUSH IV SOLN 100 UNIT/ML 100 UNIT/ML
5 SOLUTION INTRAVENOUS EVERY 8 HOURS
Status: CANCELLED | OUTPATIENT
Start: 2018-04-17

## 2018-04-17 RX ORDER — HEPARIN SODIUM (PORCINE) LOCK FLUSH IV SOLN 100 UNIT/ML 100 UNIT/ML
5 SOLUTION INTRAVENOUS EVERY 8 HOURS
Status: CANCELLED | OUTPATIENT
Start: 2018-05-08

## 2018-04-17 RX ORDER — EPINEPHRINE 0.3 MG/.3ML
0.3 INJECTION SUBCUTANEOUS EVERY 5 MIN PRN
Status: CANCELLED | OUTPATIENT
Start: 2018-04-17

## 2018-04-17 RX ORDER — METHYLPREDNISOLONE SODIUM SUCCINATE 125 MG/2ML
125 INJECTION, POWDER, LYOPHILIZED, FOR SOLUTION INTRAMUSCULAR; INTRAVENOUS
Status: CANCELLED
Start: 2018-05-08

## 2018-04-17 RX ORDER — ALBUTEROL SULFATE 90 UG/1
1-2 AEROSOL, METERED RESPIRATORY (INHALATION)
Status: CANCELLED
Start: 2018-05-08

## 2018-04-17 RX ORDER — ACETAMINOPHEN 325 MG/1
650 TABLET ORAL
Status: CANCELLED | OUTPATIENT
Start: 2018-04-17

## 2018-04-17 RX ORDER — EPINEPHRINE 1 MG/ML
0.3 INJECTION, SOLUTION, CONCENTRATE INTRAVENOUS EVERY 5 MIN PRN
Status: CANCELLED | OUTPATIENT
Start: 2018-05-08

## 2018-04-17 RX ORDER — HEPARIN SODIUM (PORCINE) LOCK FLUSH IV SOLN 100 UNIT/ML 100 UNIT/ML
5 SOLUTION INTRAVENOUS ONCE
Status: COMPLETED | OUTPATIENT
Start: 2018-04-17 | End: 2018-04-17

## 2018-04-17 RX ORDER — LORAZEPAM 2 MG/ML
0.5 INJECTION INTRAMUSCULAR EVERY 4 HOURS PRN
Status: CANCELLED
Start: 2018-04-17

## 2018-04-17 RX ORDER — SODIUM CHLORIDE 9 MG/ML
1000 INJECTION, SOLUTION INTRAVENOUS CONTINUOUS PRN
Status: CANCELLED
Start: 2018-05-08

## 2018-04-17 RX ORDER — DIPHENHYDRAMINE HYDROCHLORIDE 50 MG/ML
50 INJECTION INTRAMUSCULAR; INTRAVENOUS
Status: CANCELLED
Start: 2018-04-17

## 2018-04-17 RX ORDER — MEPERIDINE HYDROCHLORIDE 25 MG/ML
25 INJECTION INTRAMUSCULAR; INTRAVENOUS; SUBCUTANEOUS EVERY 30 MIN PRN
Status: CANCELLED | OUTPATIENT
Start: 2018-05-08

## 2018-04-17 RX ORDER — DIPHENHYDRAMINE HYDROCHLORIDE 50 MG/ML
50 INJECTION INTRAMUSCULAR; INTRAVENOUS
Status: CANCELLED
Start: 2018-05-08

## 2018-04-17 RX ORDER — DIPHENHYDRAMINE HCL 25 MG
50 CAPSULE ORAL ONCE
Status: CANCELLED
Start: 2018-05-08

## 2018-04-17 RX ORDER — METHYLPREDNISOLONE SODIUM SUCCINATE 125 MG/2ML
125 INJECTION, POWDER, LYOPHILIZED, FOR SOLUTION INTRAMUSCULAR; INTRAVENOUS
Status: CANCELLED
Start: 2018-04-17

## 2018-04-17 RX ORDER — ALBUTEROL SULFATE 90 UG/1
1-2 AEROSOL, METERED RESPIRATORY (INHALATION)
Status: CANCELLED
Start: 2018-04-17

## 2018-04-17 RX ORDER — HEPARIN SODIUM (PORCINE) LOCK FLUSH IV SOLN 100 UNIT/ML 100 UNIT/ML
5 SOLUTION INTRAVENOUS EVERY 8 HOURS
Status: DISCONTINUED | OUTPATIENT
Start: 2018-04-17 | End: 2018-04-17 | Stop reason: HOSPADM

## 2018-04-17 RX ORDER — ACETAMINOPHEN 325 MG/1
650 TABLET ORAL
Status: CANCELLED | OUTPATIENT
Start: 2018-05-08

## 2018-04-17 RX ORDER — SODIUM CHLORIDE 9 MG/ML
1000 INJECTION, SOLUTION INTRAVENOUS CONTINUOUS PRN
Status: CANCELLED
Start: 2018-04-17

## 2018-04-17 RX ADMIN — SODIUM CHLORIDE, PRESERVATIVE FREE 5 ML: 5 INJECTION INTRAVENOUS at 09:26

## 2018-04-17 RX ADMIN — SODIUM CHLORIDE, PRESERVATIVE FREE 5 ML: 5 INJECTION INTRAVENOUS at 11:47

## 2018-04-17 RX ADMIN — TRASTUZUMAB 450 MG: 150 INJECTION, POWDER, LYOPHILIZED, FOR SOLUTION INTRAVENOUS at 11:15

## 2018-04-17 RX ADMIN — DENOSUMAB 120 MG: 120 INJECTION SUBCUTANEOUS at 11:36

## 2018-04-17 ASSESSMENT — PAIN SCALES - GENERAL: PAINLEVEL: NO PAIN (0)

## 2018-04-17 NOTE — NURSING NOTE
"Oncology Rooming Note    April 17, 2018 9:35 AM   Hoda Brush is a 63 year old female who presents for:    Chief Complaint   Patient presents with     Oncology Clinic Visit     Breast Cancer     Initial Vitals: /64 (BP Location: Right arm, Cuff Size: Adult Regular)  Pulse 73  Temp 98  F (36.7  C) (Oral)  Resp 16  Ht 1.575 m (5' 2\")  Wt 79.1 kg (174 lb 6.4 oz)  SpO2 99%  BMI 31.9 kg/m2 Estimated body mass index is 31.9 kg/(m^2) as calculated from the following:    Height as of this encounter: 1.575 m (5' 2\").    Weight as of this encounter: 79.1 kg (174 lb 6.4 oz). Body surface area is 1.86 meters squared.  No Pain (0) Comment: Data Unavailable   No LMP recorded. Patient is postmenopausal.  Allergies reviewed: Yes  Medications reviewed: Yes    Medications: Medication refills not needed today.  Pharmacy name entered into [employer]:    [id number] [place] [facility] - [place], [place] - [address] [id number] - [place], [place] - [address] [facility] [address] [place] [address] [facility] ST[id number] - [place], [place] - [address] AT  [address] [place] & [facility]    Clinical concerns: No New Concerns    5 minutes for nursing intake (face to face time)     JOHAN Mcfarland      "

## 2018-04-17 NOTE — PATIENT INSTRUCTIONS
Contact Numbers    [facility] Main Line: [phone]  [facility] Triage:  [phone]    Call triage with chills and/or temperature greater than or equal to 100.5, uncontrolled nausea/vomiting, diarrhea, constipation, dizziness, shortness of breath, chest pain, bleeding, unexplained bruising, or any new/concerning symptoms, questions/concerns.     If you are having any concerning symptoms or wish to speak to a provider before your next infusion visit, please call your care coordinator or triage to notify them so we can adequately serve you.       After Hours: [phone]    If after hours, weekends, or holidays, call main hospital  and ask for Oncology doctor on call.         April 2018 Sunday Monday Tuesday Wednesday Thursday Friday Saturday   1     2     3     4     5     6     7       8     9     10     11     12     13     14       15     16     17     [facility]    8:30 AM   (60 min.)    [facility] DRAW   [facility] Lab Draw     [facility] RETURN    9:25 AM   (90 min.)   Jossie Sparrow PA   [facility] 60   10:30 AM   (60 min.)   [facility] ON[facility]Y INFUSION   [facility] 18     19     20     21       22     23     24     25     26     27     28       29     30                                         May 2018   Jakub Monday Tuesday Wednesday Thursday Friday Saturday             1     2     3     4     5       6     7     8     [facility]    8:15 AM   (15 min.)   [facility]   [facility] Lab Draw     [facility] RETURN    8:35 AM   (50 min.)   Jossie Sparrow PA   [facility] 60   10:00 AM   (60 min.)   [facility] ONCOL[facility]Y INFUSION   [facility] 9     10     11     12       13     14     15     16     17     18     19       20     21     22     23     24     25     26       27     28     29     [facility]    8:15 AM   (15 min.)   [facility]   [facility]  Lab Draw     [facility] RETURN    8:35 AM   (50 min.)   Jossie Sparrow PA   [facility] ONC INFUSION 60   10:00 AM   (60 min.)    ONCOLOGY INFUSION   [facility] 30     31                           Recent Results (from the past 24 hour(s))   CBC with platelets differential    Collection Time: 04/17/18  9:33 AM   Result Value Ref Range    WBC 6.6 4.0 - 11.0 10e9/L    RBC Count 3.75 (L) 3.8 - 5.2 10e12/L    Hemoglobin 11.4 (L) 11.7 - 15.7 g/dL    Hematocrit 36.2 35.0 - 47.0 %    MCV 97 78 - 100 fl    MCH 30.4 26.5 - 33.0 pg    MCHC 31.5 31.5 - 36.5 g/dL    RDW 13.6 10.0 - 15.0 %    Platelet Count 187 150 - 450 10e9/L    Diff Method Automated Method     % Neutrophils 51.5 %    % Lymphocytes 37.0 %    % Monocytes 6.8 %    % Eosinophils 3.6 %    % Basophils 0.8 %    % Immature Granulocytes 0.3 %    Nucleated RBCs 0 0 /100    Absolute Neutrophil 3.4 1.6 - 8.3 10e9/L    Absolute Lymphocytes 2.4 0.8 - 5.3 10e9/L    Absolute Monocytes 0.5 0.0 - 1.3 10e9/L    Absolute Eosinophils 0.2 0.0 - 0.7 10e9/L    Absolute Basophils 0.1 0.0 - 0.2 10e9/L    Abs Immature Granulocytes 0.0 0 - 0.4 10e9/L    Absolute Nucleated RBC 0.0    Comprehensive metabolic panel    Collection Time: 04/17/18  9:33 AM   Result Value Ref Range    Sodium 144 133 - 144 mmol/L    Potassium 4.0 3.4 - 5.3 mmol/L    Chloride 111 (H) 94 - 109 mmol/L    Carbon Dioxide 27 20 - 32 mmol/L    Anion Gap 6 3 - 14 mmol/L    Glucose 76 70 - 99 mg/dL    Urea Nitrogen 18 7 - 30 mg/dL    Creatinine 0.69 0.52 - 1.04 mg/dL    GFR Estimate 86 >60 mL/min/1.7m2    GFR Estimate If Black >90 >60 mL/min/1.7m2    Calcium 8.6 8.5 - 10.1 mg/dL    Bilirubin Total 0.3 0.2 - 1.3 mg/dL    Albumin 3.2 (L) 3.4 - 5.0 g/dL    Protein Total 7.2 6.8 - 8.8 g/dL    Alkaline Phosphatase 37 (L) 40 - 150 U/L    ALT 17 0 - 50 U/L    AST 17 0 - 45 U/L

## 2018-04-17 NOTE — MR AVS SNAPSHOT
After Visit Summary   4/17/2018    Hoda Brush    MRN: [id number]           Patient Information     Date Of Birth          1955        Visit Information        Provider Department      4/17/2018 10:30 AM Sheeba Cifuentes;  15 ATC;  ONCOLOGY INFUSION  Services Department        Today's Diagnoses     Malignant neoplasm of left female breast, unspecified estrogen receptor status, unspecified site of breast (H)    -  1    Bone metastasis (H)          Care Instructions    Contact Numbers    [facility] Main Line: [phone]  [facility] Triage:  [phone]    Call triage with chills and/or temperature greater than or equal to 100.5, uncontrolled nausea/vomiting, diarrhea, constipation, dizziness, shortness of breath, chest pain, bleeding, unexplained bruising, or any new/concerning symptoms, questions/concerns.     If you are having any concerning symptoms or wish to speak to a provider before your next infusion visit, please call your care coordinator or triage to notify them so we can adequately serve you.       After Hours: [phone]    If after hours, weekends, or holidays, call main hospital  and ask for Oncology doctor on call.         April 2018 Sunday Monday Tuesday Wednesday Thursday Friday Saturday   1     2     3     4     5     6     7       8     9     10     11     12     13     14       15     16     17     [facility] DRAW    8:30 AM   (60 min.)    [facility] DRAW   [facility] Lab Draw     P RETURN    9:25 AM   (90 min.)   Jossie Sparrow PA   [facility] ONC INFUSION 60   10:30 AM   (60 min.)    ONCOLOGY INFUSION   [facility] 18     19     20     21       22     23     24     25     26     27     28       29     30                                         May 2018   Jakub Monday Tuesday Wednesday Thursday Friday Saturday             1     2     3     4     5       6     7     8     [facility]  LAB DRAW    8:15 AM   (15 min.)   [facility] LAB DRAW   [facility] Lab Draw     [facility] RETURN    8:35 AM   (50 min.)   Jossie Sparrow PA   [facility] ONC INFUSION 60   10:00 AM   (60 min.)    ONCOLOGY INFUSION   [facility] 9     10     11     12       13     14     15     16     17     18     19       20     21     22     23     24     25     26       27     28     29     [facility] LAB DRAW    8:15 AM   (15 min.)   [facility] LAB DRAW   [facility] Lab Draw     [facility] RETURN    8:35 AM   (50 min.)   Jossie Sparrow PA   [facility] ONC INFUSION 60   10:00 AM   (60 min.)    ONCOLOGY INFUSION   [facility] 30     31                           Recent Results (from the past 24 hour(s))   CBC with platelets differential    Collection Time: 04/17/18  9:33 AM   Result Value Ref Range    WBC 6.6 4.0 - 11.0 10e9/L    RBC Count 3.75 (L) 3.8 - 5.2 10e12/L    Hemoglobin 11.4 (L) 11.7 - 15.7 g/dL    Hematocrit 36.2 35.0 - 47.0 %    MCV 97 78 - 100 fl    MCH 30.4 26.5 - 33.0 pg    MCHC 31.5 31.5 - 36.5 g/dL    RDW 13.6 10.0 - 15.0 %    Platelet Count 187 150 - 450 10e9/L    Diff Method Automated Method     % Neutrophils 51.5 %    % Lymphocytes 37.0 %    % Monocytes 6.8 %    % Eosinophils 3.6 %    % Basophils 0.8 %    % Immature Granulocytes 0.3 %    Nucleated RBCs 0 0 /100    Absolute Neutrophil 3.4 1.6 - 8.3 10e9/L    Absolute Lymphocytes 2.4 0.8 - 5.3 10e9/L    Absolute Monocytes 0.5 0.0 - 1.3 10e9/L    Absolute Eosinophils 0.2 0.0 - 0.7 10e9/L    Absolute Basophils 0.1 0.0 - 0.2 10e9/L    Abs Immature Granulocytes 0.0 0 - 0.4 10e9/L    Absolute Nucleated RBC 0.0    Comprehensive metabolic panel    Collection Time: 04/17/18  9:33 AM   Result Value Ref Range    Sodium 144 133 - 144 mmol/L    Potassium 4.0 3.4 - 5.3 mmol/L    Chloride 111 (H) 94 - 109 mmol/L    Carbon Dioxide 27 20 - 32 mmol/L    Anion Gap 6 3 - 14  mmol/L    Glucose 76 70 - 99 mg/dL    Urea Nitrogen 18 7 - 30 mg/dL    Creatinine 0.69 0.52 - 1.04 mg/dL    GFR Estimate 86 >60 mL/min/1.7m2    GFR Estimate If Black >90 >60 mL/min/1.7m2    Calcium 8.6 8.5 - 10.1 mg/dL    Bilirubin Total 0.3 0.2 - 1.3 mg/dL    Albumin 3.2 (L) 3.4 - 5.0 g/dL    Protein Total 7.2 6.8 - 8.8 g/dL    Alkaline Phosphatase 37 (L) 40 - 150 U/L    ALT 17 0 - 50 U/L    AST 17 0 - 45 U/L                 Follow-ups after your visit        Your next 10 appointments already scheduled     May 08, 2018  8:15 AM CDT   [facility] Draw with  [facility] DRAW   [facility] ([facility])    [address] [place] [address]  [address]  [facility] [address]   [phone]            May 08, 2018  8:50 AM CDT   (Arrive by 8:35 AM)   Return Visit with SEMAJ Eddy   [facility] ([facility])    [address] [place] [address]  [address]  [facility] [address]   [phone]            May 08, 2018 10:00 AM CDT   Infusion 60 with [facility] ONCOLOGY INFUSION, [facility] 29 ATC   [facility])    [address] [place] [address]  [address]  [facility] [address]   [phone]            May 29, 2018  8:15 AM CDT   [facility] Lab Draw with [facility] DRAW   [facility] Draw ([facility])    [address] [place] [address]  [address]  [facility] [address]   [phone]            May 29, 2018  8:50 AM CDT   (Arrive by 8:35 AM)   Return Visit with SEMAJ Eddy   [facility])    [address] [place] [address]  [address]  [facility] [address]   [phone]            May 29, 2018 10:00 AM CDT   Infusion 60 with UC ONCOLOGY INFUSION, UC 29 ATC   [facility]    [address]  [address]  [facility] [address]   [phone]            Jun 19, 2018 12:00  PM CDT   [facility] Lab Draw with  [facility] LAB DRAW   [facility] Lab Draw ([facility])    [address]  [address]  [facility] [id number] [phone]            Jun 19, 2018 12:30 PM CDT   (Arrive by 12:15 PM)   Return Visit with Lisandro Aguiar MD   [facility] ([facility])    [address]  [address]  [facility] [id number] [phone]              Who to contact     If you have questions or need follow up information about today's clinic visit or your schedule please contact [facility] directly at [phone].  Normal or non-critical lab and imaging results will be communicated to you by Modenushart, letter or phone within 4 business days after the clinic has received the results. If you do not hear from us within 7 days, please contact the clinic through TabbedOutt or phone. If you have a critical or abnormal lab result, we will notify you by phone as soon as possible.  Submit refill requests through [employer] or call your pharmacy and they will forward the refill request to us. Please allow 3 business days for your refill to be completed.          Additional Information About Your Visit        [employer] Information     [employer] gives you secure access to your electronic health record. If you see a primary care provider, you can also send messages to your care team and make appointments. If you have questions, please call your primary care clinic.  If you do not have a primary care provider, please call [phone] and they will assist you.        Care EveryWhere ID     This is your Care EveryWhere ID. This could be used by other organizations to access your [facility] medical records  [id number]         Blood Pressure from Last 3 Encounters:   04/17/18 115/64   03/27/18 108/73   03/06/18 102/60    Weight from Last 3 Encounters:   04/17/18 79.1 kg (174 lb 6.4 oz)   03/27/18 78.9 kg (173 lb 14.4  oz)   03/06/18 78 kg (172 lb)              We Performed the Following     CA 27.29 Breast tumor marker     CBC with platelets differential     CEA     Comprehensive metabolic panel        Primary Care Provider Fax #    Physician No Ref-Primary [phone]       No address on file        Equal Access to Services     NOE TAYLOR : Luz Marina kamran casanova malini Sosusanna, waspencerda luqadaha, qagladysta kaalmada franky, alexa reeder laquocnba beltran. So [facility] [phone].    ATENCIÓN: Si habla español, tiene a lagunas disposición servicios gratuitos de asistencia lingüística. Llame al [phone].    We comply with applicable federal civil rights laws and [place] laws. We do not discriminate on the basis of race, color, national origin, age, disability, sex, sexual orientation, or gender identity.            Thank you!     Thank you for choosing [facility]  for your care. Our goal is always to provide you with excellent care. Hearing back from our patients is one way we can continue to improve our services. Please take a few minutes to complete the written survey that you may receive in the mail after your visit with us. Thank you!             Your Updated Medication List - Protect others around you: Learn how to safely use, store and throw away your medicines at www.disposemymeds.org.          This list is accurate as of 4/17/18 12:37 PM.  Always use your most recent med list.                   Brand Name Dispense Instructions for use Diagnosis    albuterol 108 (90 Base) MCG/ACT Inhaler    PROAIR HFA/PROVENTIL HFA/VENTOLIN HFA    1 Inhaler    Inhale 2 puffs into the lungs every 6 hours as needed for shortness of breath / dyspnea or wheezing    Cough       benzonatate 200 MG capsule    TESSALON    21 capsule    Take 1 capsule (200 mg) by mouth 3 times daily as needed for cough    Cough       cephALEXin 500 MG capsule    KEFLEX    56 capsule    Take 1 capsule (500 mg) by mouth every 6 hours     Cellulitis of left upper extremity       guaiFENesin-codeine 100-10 MG/5ML Soln solution    ROBITUSSIN AC    240 mL    Take 5-10 mLs by mouth every 6 hours as needed for cough    Cough       order for DME     1 each    L UE class 2 compression sleeve and gauntlet Night garment Bandaging supplies    Lymphedema of left upper extremity       tamoxifen 20 MG tablet    NOLVADEX    90 tablet    Take 1 tablet (20 mg) by mouth daily    Cancer of central portion of left breast (H)       VITAMIN D (CHOLECALCIFEROL) PO      Take 1,000 Units by mouth daily

## 2018-04-17 NOTE — PROGRESS NOTES
Infusion Nursing Note:  Hoda Brush presents today for Cycle 11 Day 1 Herceptin, Xgeva.    Patient seen by provider today: Yes: SEMAJ Escobar   present during visit today: Yes, Language: Australian.     Note: Patient denies jaw pain. States she had her last routine dental cleaning in January 2018, and she is taking her Vitamin D and Calcium supplements. No other concerns at this time.     Intravenous Access:  Implanted Port.    Treatment Conditions:  Lab Results   Component Value Date    HGB 11.4 04/17/2018     Lab Results   Component Value Date    WBC 6.6 04/17/2018      Lab Results   Component Value Date    ANEU 3.4 04/17/2018     Lab Results   Component Value Date     04/17/2018      Lab Results   Component Value Date     04/17/2018                   Lab Results   Component Value Date    POTASSIUM 4.0 04/17/2018           No results found for: MAG         Lab Results   Component Value Date    CR 0.69 04/17/2018                   Lab Results   Component Value Date    TAYLER 8.6 04/17/2018                Lab Results   Component Value Date    BILITOTAL 0.3 04/17/2018           Lab Results   Component Value Date    ALBUMIN 3.2 04/17/2018                    Lab Results   Component Value Date    ALT 17 04/17/2018           Lab Results   Component Value Date    AST 17 04/17/2018     Results reviewed, labs MET treatment parameters, ok to proceed with treatment.  ECHO/MUGA completed 3/26/18  EF 60-65%.      Post Infusion/Injection Assessment:  Patient tolerated infusion without incident.  Patient tolerated Xgeva injection to Right Upper Arm without incident.  Blood return noted pre and post infusion.  Access discontinued per protocol.    Discharge Plan:   Patient declined prescription refills.  Discharge instructions reviewed with: Patient.  Patient verbalized understanding of discharge instructions and all questions answered.  AVS to patient via RF nanoT.  Patient will return 5/8/18 for  next appointment.   Patient discharged in stable condition accompanied by: .  Face to Face time: 0.    LAURA BROOKS RN

## 2018-04-17 NOTE — PROGRESS NOTES
Oncology/Hematology Visit Note  Apr 17, 2018    Reason for Visit: follow up of ER positive, HER2 positive left metastatic breast cancer    History of Present Illness: Hoda Brush is a 63 year old female with metastatic ER positive, HER 2 positive left breast cancer with bone mets.     TREATMENT HISTORY:  A. Initial diagnosis with metastatic breast cancer in [facility].  Neoadjuvant CAF x 6.   B. Left mastectomy. Left axillary node dissection.  C.  Radiation in 1 dose to R iliac region. g Gy.  C. Herceptin for 2 years taxane for a prescribed course then stopped, monthly zoledronic acid.  She had 2 years of Herceptin with tamoxifen added after chemotherapy.  D.  Tamoxifen alone and zoledronic acid every 3 months.  E.  [place]  [place] restarted Herceptin every 3 weeks and continued tamoxifen. Bone targeted agent changed to denosumab every 6 weeks.     She was admitted 9/20-9/21 with LUE cellulitis with leukocytosis. Given a dose of IV Ancef and discharged on keflex. Last restaging was 3/26/18 with CT CAP showing stable disease..      She presents prior to her next dose of Herceptin.      Interval History:  Hoda has been feeling overall well. Denies any recent changes in her health. Tolerating Tamoxifen well. Denies any vaginal bleeding. Denies any pain, fatigue, depression, anxiety. Lymphedema in left arm stable. She is taking calcium + vitamin D.  Her 10-point review of systems is otherwise negative.     Current Outpatient Prescriptions   Medication Sig Dispense Refill     tamoxifen (NOLVADEX) 20 MG tablet Take 1 tablet (20 mg) by mouth daily 90 tablet 3     VITAMIN D, CHOLECALCIFEROL, PO Take 1,000 Units by mouth daily       albuterol (PROAIR HFA/PROVENTIL HFA/VENTOLIN HFA) 108 (90 BASE) MCG/ACT Inhaler Inhale 2 puffs into the lungs every 6 hours as needed for shortness of breath / dyspnea or wheezing (Patient not taking: Reported on 3/6/2018) 1 Inhaler 0     benzonatate (TESSALON) 200 MG capsule Take  "1 capsule (200 mg) by mouth 3 times daily as needed for cough (Patient not taking: Reported on 2/13/2018) 21 capsule 0     guaiFENesin-codeine (ROBITUSSIN AC) 100-10 MG/5ML SOLN solution Take 5-10 mLs by mouth every 6 hours as needed for cough (Patient not taking: Reported on 2/13/2018) 240 mL 0     cephALEXin (KEFLEX) 500 MG capsule Take 1 capsule (500 mg) by mouth every 6 hours (Patient not taking: Reported on 2/13/2018) 56 capsule 0     order for DME L UE class 2 compression sleeve and gauntlet  Night garment  Bandaging supplies (Patient not taking: Reported on 2/13/2018) 1 each 1       Physical Examination:  General: The patient is a pleasant female in no acute distress.  /64 (BP Location: Right arm, Cuff Size: Adult Regular)  Pulse 73  Temp 98  F (36.7  C) (Oral)  Resp 16  Ht 1.575 m (5' 2\")  Wt 79.1 kg (174 lb 6.4 oz)  SpO2 99%  BMI 31.9 kg/m2  Wt Readings from Last 10 Encounters:   04/17/18 79.1 kg (174 lb 6.4 oz)   03/27/18 78.9 kg (173 lb 14.4 oz)   03/06/18 78 kg (172 lb)   02/13/18 79.7 kg (175 lb 12.8 oz)   01/25/18 79.8 kg (176 lb)   01/24/18 79.7 kg (175 lb 12.8 oz)   01/05/18 80.6 kg (177 lb 9.6 oz)   12/12/17 79.2 kg (174 lb 11.2 oz)   11/22/17 78.8 kg (173 lb 12.8 oz)   10/31/17 79.2 kg (174 lb 9.6 oz)     HEENT: EOMI, PERRL. Sclerae are anicteric. Oral mucosa is pink and moist with no lesions or thrush.   Lymph: No lymphadenopathy in the cervical, supraclavicular or axillary areas. Left arm with some non-pitting edema compared to right.  Breast: Deferred today  Heart: Regular rate and rhythm.   Lungs: Clear to auscultation bilaterally.   Abdomen: Bowel sounds present, soft, nontender, nondistended  Extremities: No lower extremity edema noted bilaterally.   Neuro: Cranial nerves II through XII are grossly intact.  Skin: No rashes, petechiae, or bruising noted on exposed skin.    Laboratory Data:  Results for NATASHA LOUIS (MRN [id number]) as of 4/17/2018 12:48   4/17/2018 09:33 "   Sodium 144   Potassium 4.0   Chloride 111 (H)   Carbon Dioxide 27   Urea Nitrogen 18   Creatinine 0.69   GFR Estimate 86   GFR Estimate If Black >90   Calcium 8.6   Anion Gap 6   Albumin 3.2 (L)   Protein Total 7.2   Bilirubin Total 0.3   Alkaline Phosphatase 37 (L)   ALT 17   AST 17   CA 27-29 7   Glucose 76   WBC 6.6   Hemoglobin 11.4 (L)   Hematocrit 36.2   Platelet Count 187   RBC Count 3.75 (L)   MCV 97   MCH 30.4   MCHC 31.5   RDW 13.6   Diff Method Automated Method   % Neutrophils 51.5   % Lymphocytes 37.0   % Monocytes 6.8   % Eosinophils 3.6   % Basophils 0.8   % Immature Granulocytes 0.3   Nucleated RBCs 0   Absolute Neutrophil 3.4   Absolute Lymphocytes 2.4   Absolute Monocytes 0.5   Absolute Eosinophils 0.2   Absolute Basophils 0.1   Abs Immature Granulocytes 0.0   Absolute Nucleated RBC 0.0   CEA 0.6       Assessment and Plan:     1. Metastatic breast cancer, ER, VT, HER2+ positive: Was last treated in [facility] with Herceptin. We have continued Herceptin every 3 weeks as well as daily tamoxifen. She tolerates treatment extremely well without overt symptoms. CT CAP on 3/26/18 with stable disease. Echocardiogram 3/26/2018 with EF 60-65% and normal LV function.  Continue Herceptin every 3 weeks. Continue Tamoxifen.   --Scheduled for Herceptin on 5/8, 5/29 with myself and labs prior, on 6/19 with Dr. Aguiar and labs prior    2. Bone metastasis: Continue xgeva every 6 weeks. Takes calcium daily, unsure of dose. She is on vitamin D3 1000 IU daily. Last Xgeva on 3/6  --Xgeva today. Next dose due 5/29     3. LUE lymphedema: She completed lymphedema treatment and has a sleeve to use now.     4. Genetic testing: negative    5. Anemia: Mild, normocytic. Negative colonoscopy (2/1/18). EGD with biopsies (2/16/18) that showed gastritis, no H pylori, intestinal metaplasia or dysplasia. Hgb 11.4 today, stable    Jossie Sparrow PA-C  [facility] [address] [id number] [phone]

## 2018-04-17 NOTE — MR AVS SNAPSHOT
After Visit Summary   4/17/2018    Hoda Brush    MRN: [id number]           Patient Information     Date Of Birth          1955        Visit Information        Provider Department      4/17/2018 9:25 AM Sheeba Cifuentes; Jossie Sparrow PA [facility]        Today's Diagnoses     Malignant neoplasm of left female breast, unspecified estrogen receptor status, unspecified site of breast (H)           Follow-ups after your visit        Your next 10 appointments already scheduled     May 08, 2018  8:15 AM CDT   Masonic Lab Draw with [facility] MASONIC LAB DRAW   [facility] Lab Draw ([facility])    [address]  [address]  [place] [address]   [phone]            May 08, 2018  8:50 AM CDT   (Arrive by 8:35 AM)   Return Visit with SEMAJ Eddy   [facility] ([facility])    [address]  [address]  [place] [address]   [phone]            May 08, 2018 10:00 AM CDT   Infusion 60 with UC ONCOLOGY INFUSION, UC 29 ATC   [facility] ([facility])    [address] [place] [address]  [address]  [place] [address]   [phone]            May 29, 2018  8:15 AM CDT   Masonic Lab Draw with [facility] MASONIC LAB DRAW   [facility] Lab Draw ([facility])    [address]  [address]  [place] [address]   [phone]            May 29, 2018  8:50 AM CDT   (Arrive by 8:35 AM)   Return Visit with SEMAJ Eddy   [facility] ([facility])    [address]  [address]  [place] [address]   [phone]            May 29, 2018 10:00 AM CDT   Infusion 60 with UC ONCOLOGY INFUSION, UC 29 ATC   [facility] ([facility])    [address] [place] [address]  [address]  [place] [address]   [phone]          "   Jun 19, 2018 12:00 PM CDT   Masonic Lab Draw with  MASONIC LAB DRAW   [facility] ([facility])    [address]  [address]  [place] [address] [phone]            Jun 19, 2018 12:30 PM CDT   (Arrive by 12:15 PM)   Return Visit with Lisandro gAuiar MD   [facility] ([facility])    [address]  [address]  [facility] [address] [phone]              Who to contact     If you have questions or need follow up information about today's clinic visit or your schedule please contact [facility] directly at [phone].  Normal or non-critical lab and imaging results will be communicated to you by MyChart, letter or phone within 4 business days after the clinic has received the results. If you do not hear from us within 7 days, please contact the clinic through [employer] or phone. If you have a critical or abnormal lab result, we will notify you by phone as soon as possible.  Submit refill requests through [employer] or call your pharmacy and they will forward the refill request to us. Please allow 3 business days for your refill to be completed.          Additional Information About Your Visit        [employer]et Information     [employer] gives you secure access to your electronic health record. If you see a primary care provider, you can also send messages to your care team and make appointments. If you have questions, please call your primary care clinic.  If you do not have a primary care provider, please call [phone] and they will assist you.        Care EveryWhere ID     This is your Care EveryWhere ID. This could be used by other organizations to access your [place] medical records  [id number]        Your Vitals Were     Pulse Temperature Respirations Height Pulse Oximetry BMI (Body Mass Index)    73 98  F (36.7  C) (Oral) 16 1.575 m (5' 2\") 99% 31.9 kg/m2       Blood " Pressure from Last 3 Encounters:   04/17/18 115/64   03/27/18 108/73   03/06/18 102/60    Weight from Last 3 Encounters:   04/17/18 79.1 kg (174 lb 6.4 oz)   03/27/18 78.9 kg (173 lb 14.4 oz)   03/06/18 78 kg (172 lb)              Today, you had the following     No orders found for display       Primary Care Provider Fax #    Physician No Ref-Primary [phone]       No address on file        Equal Access to Services     NOE VAZQUEZ : Hadii aad ku hadasho Soomaali, waaxda luqadaha, qaybta kaalmada adeegyada, alexa angulo . So [facility] [phone].    ATENCIÓN: Si habla español, tiene a lagunas disposición servicios gratuitos de asistencia lingüística. Llame al [phone].    We comply with applicable federal civil rights laws and [place] laws. We do not discriminate on the basis of race, color, national origin, age, disability, sex, sexual orientation, or gender identity.            Thank you!     Thank you for choosing [facility]  for your care. Our goal is always to provide you with excellent care. Hearing back from our patients is one way we can continue to improve our services. Please take a few minutes to complete the written survey that you may receive in the mail after your visit with us. Thank you!             Your Updated Medication List - Protect others around you: Learn how to safely use, store and throw away your medicines at www.disposemymeds.org.          This list is accurate as of 4/17/18 12:51 PM.  Always use your most recent med list.                   Brand Name Dispense Instructions for use Diagnosis    albuterol 108 (90 Base) MCG/ACT Inhaler    PROAIR HFA/PROVENTIL HFA/VENTOLIN HFA    1 Inhaler    Inhale 2 puffs into the lungs every 6 hours as needed for shortness of breath / dyspnea or wheezing    Cough       benzonatate 200 MG capsule    TESSALON    21 capsule    Take 1 capsule (200 mg) by mouth 3 times daily as needed for cough    Cough        cephALEXin 500 MG capsule    KEFLEX    56 capsule    Take 1 capsule (500 mg) by mouth every 6 hours    Cellulitis of left upper extremity       guaiFENesin-codeine 100-10 MG/5ML Soln solution    ROBITUSSIN AC    240 mL    Take 5-10 mLs by mouth every 6 hours as needed for cough    Cough       order for DME     1 each    L UE class 2 compression sleeve and gauntlet Night garment Bandaging supplies    Lymphedema of left upper extremity       tamoxifen 20 MG tablet    NOLVADEX    90 tablet    Take 1 tablet (20 mg) by mouth daily    Cancer of central portion of left breast (H)       VITAMIN D (CHOLECALCIFEROL) PO      Take 1,000 Units by mouth daily

## 2018-04-17 NOTE — LETTER
4/17/2018      RE: Hoda Brush  [address] [address]       Oncology/Hematology Visit Note  Apr 17, 2018    Reason for Visit: follow up of ER positive, HER2 positive left metastatic breast cancer    History of Present Illness: Hoda Brush is a 63 year old female with metastatic ER positive, HER 2 positive left breast cancer with bone mets.     TREATMENT HISTORY:  A. Initial diagnosis with metastatic breast cancer in [facility].  Neoadjuvant CAF x 6.   B. Left mastectomy. Left axillary node dissection.  C.  Radiation in 1 dose to R iliac region. g Gy.  C. Herceptin for 2 years taxane for a prescribed course then stopped, monthly zoledronic acid.  She had 2 years of Herceptin with tamoxifen added after chemotherapy.  D.  Tamoxifen alone and zoledronic acid every 3 months.  E[place]  [place]  We restarted Herceptin every 3 weeks and continued tamoxifen. Bone targeted agent changed to denosumab every 6 weeks.     She was admitted 9/20-9/21 with LUE cellulitis with leukocytosis. Given a dose of IV Ancef and discharged on keflex. Last restaging was 3/26/18 with CT CAP showing stable disease..      She presents prior to her next dose of Herceptin.      Interval History:  Hoda has been feeling overall well. Denies any recent changes in her health. Tolerating Tamoxifen well. Denies any vaginal bleeding. Denies any pain, fatigue, depression, anxiety. Lymphedema in left arm stable. She is taking calcium + vitamin D.  Her 10-point review of systems is otherwise negative.     Current Outpatient Prescriptions   Medication Sig Dispense Refill     tamoxifen (NOLVADEX) 20 MG tablet Take 1 tablet (20 mg) by mouth daily 90 tablet 3     VITAMIN D, CHOLECALCIFEROL, PO Take 1,000 Units by mouth daily       albuterol (PROAIR HFA/PROVENTIL HFA/VENTOLIN HFA) 108 (90 BASE) MCG/ACT Inhaler Inhale 2 puffs into the lungs every 6 hours as needed for shortness of breath / dyspnea or wheezing (Patient not  "taking: Reported on 3/6/2018) 1 Inhaler 0     benzonatate (TESSALON) 200 MG capsule Take 1 capsule (200 mg) by mouth 3 times daily as needed for cough (Patient not taking: Reported on 2/13/2018) 21 capsule 0     guaiFENesin-codeine (ROBITUSSIN AC) 100-10 MG/5ML SOLN solution Take 5-10 mLs by mouth every 6 hours as needed for cough (Patient not taking: Reported on 2/13/2018) 240 mL 0     cephALEXin (KEFLEX) 500 MG capsule Take 1 capsule (500 mg) by mouth every 6 hours (Patient not taking: Reported on 2/13/2018) 56 capsule 0     order for DME L UE class 2 compression sleeve and gauntlet  Night garment  Bandaging supplies (Patient not taking: Reported on 2/13/2018) 1 each 1       Physical Examination:  General: The patient is a pleasant female in no acute distress.  /64 (BP Location: Right arm, Cuff Size: Adult Regular)  Pulse 73  Temp 98  F (36.7  C) (Oral)  Resp 16  Ht 1.575 m (5' 2\")  Wt 79.1 kg (174 lb 6.4 oz)  SpO2 99%  BMI 31.9 kg/m2  Wt Readings from Last 10 Encounters:   04/17/18 79.1 kg (174 lb 6.4 oz)   03/27/18 78.9 kg (173 lb 14.4 oz)   03/06/18 78 kg (172 lb)   02/13/18 79.7 kg (175 lb 12.8 oz)   01/25/18 79.8 kg (176 lb)   01/24/18 79.7 kg (175 lb 12.8 oz)   01/05/18 80.6 kg (177 lb 9.6 oz)   12/12/17 79.2 kg (174 lb 11.2 oz)   11/22/17 78.8 kg (173 lb 12.8 oz)   10/31/17 79.2 kg (174 lb 9.6 oz)     HEENT: EOMI, PERRL. Sclerae are anicteric. Oral mucosa is pink and moist with no lesions or thrush.   Lymph: No lymphadenopathy in the cervical, supraclavicular or axillary areas. Left arm with some non-pitting edema compared to right.  Breast: Deferred today  Heart: Regular rate and rhythm.   Lungs: Clear to auscultation bilaterally.   Abdomen: Bowel sounds present, soft, nontender, nondistended  Extremities: No lower extremity edema noted bilaterally.   Neuro: Cranial nerves II through XII are grossly intact.  Skin: No rashes, petechiae, or bruising noted on exposed skin.    Laboratory " Data:  Results for NATASHA LOUIS (MRN [id number]) as of 4/17/2018 12:48   4/17/2018 09:33   Sodium 144   Potassium 4.0   Chloride 111 (H)   Carbon Dioxide 27   Urea Nitrogen 18   Creatinine 0.69   GFR Estimate 86   GFR Estimate If Black >90   Calcium 8.6   Anion Gap 6   Albumin 3.2 (L)   Protein Total 7.2   Bilirubin Total 0.3   Alkaline Phosphatase 37 (L)   ALT 17   AST 17   CA 27-29 7   Glucose 76   WBC 6.6   Hemoglobin 11.4 (L)   Hematocrit 36.2   Platelet Count 187   RBC Count 3.75 (L)   MCV 97   MCH 30.4   MCHC 31.5   RDW 13.6   Diff Method Automated Method   % Neutrophils 51.5   % Lymphocytes 37.0   % Monocytes 6.8   % Eosinophils 3.6   % Basophils 0.8   % Immature Granulocytes 0.3   Nucleated RBCs 0   Absolute Neutrophil 3.4   Absolute Lymphocytes 2.4   Absolute Monocytes 0.5   Absolute Eosinophils 0.2   Absolute Basophils 0.1   Abs Immature Granulocytes 0.0   Absolute Nucleated RBC 0.0   CEA 0.6       Assessment and Plan:     1. Metastatic breast cancer, ER, OR, HER2+ positive: Was last treated in [facility] with Herceptin. We have continued Herceptin every 3 weeks as well as daily tamoxifen. She tolerates treatment extremely well without overt symptoms. CT CAP on 3/26/18 with stable disease. Echocardiogram  3/26/2018 with EF 60-65% and normal LV function.  Continue Herceptin every 3 weeks. Continue Tamoxifen.   --Scheduled for Herceptin on 5/8, 5/29 with myself and labs prior, on 6/19 with Dr. Aguiar and labs prior    2. Bone metastasis: Continue xgeva every 6 weeks. Takes calcium daily, unsure of dose. She is on vitamin D3 1000 IU daily. Last Xgeva on 3/6  --Xgeva today. Next dose due 5/29     3. LUE lymphedema: She completed lymphedema treatment and has a sleeve to use now.     4. Genetic testing: negative    5. Anemia: Mild, normocytic. Negative colonoscopy (2/1/18). EGD with biopsies (2/16/18) that showed gastritis, no H pylori, intestinal metaplasia or dysplasia. Hgb 11.4 today, stable    Jossie  Kyara FLETCHER  [facility]  [address] [place], [place] [address] [phone]

## 2018-04-17 NOTE — NURSING NOTE
"Chief Complaint   Patient presents with     Oncology Clinic Visit     Breast Cancer     Port Draw     Labs drawn from port by RN. Line flushed with saline and heparin. Vs taken and pt checked in for appt     Port accessed with 20g 3/4\" gripper needle by RN, labs collected, line flushed with saline and heparin.  Vitals taken. Pt checked in for appointment(s).    Emily Munoz RN  "

## 2018-05-08 ENCOUNTER — ONCOLOGY VISIT (OUTPATIENT)
Dept: ONCOLOGY | Facility: CLINIC | Age: 63
End: 2018-05-08
Attending: INTERNAL MEDICINE
Payer: COMMERCIAL

## 2018-05-08 VITALS
HEART RATE: 81 BPM | DIASTOLIC BLOOD PRESSURE: 70 MMHG | TEMPERATURE: 98.2 F | RESPIRATION RATE: 16 BRPM | BODY MASS INDEX: 32.15 KG/M2 | SYSTOLIC BLOOD PRESSURE: 121 MMHG | OXYGEN SATURATION: 95 % | WEIGHT: 175.8 LBS

## 2018-05-08 DIAGNOSIS — C50.912 MALIGNANT NEOPLASM OF LEFT FEMALE BREAST, UNSPECIFIED ESTROGEN RECEPTOR STATUS, UNSPECIFIED SITE OF BREAST (H): Primary | ICD-10-CM

## 2018-05-08 LAB
ALBUMIN SERPL-MCNC: 3 G/DL (ref 3.4–5)
ALP SERPL-CCNC: 35 U/L (ref 40–150)
ALT SERPL W P-5'-P-CCNC: 17 U/L (ref 0–50)
ANION GAP SERPL CALCULATED.3IONS-SCNC: 6 MMOL/L (ref 3–14)
AST SERPL W P-5'-P-CCNC: 18 U/L (ref 0–45)
BASOPHILS # BLD AUTO: 0 10E9/L (ref 0–0.2)
BASOPHILS NFR BLD AUTO: 0.3 %
BILIRUB SERPL-MCNC: 0.2 MG/DL (ref 0.2–1.3)
BUN SERPL-MCNC: 16 MG/DL (ref 7–30)
CALCIUM SERPL-MCNC: 8.4 MG/DL (ref 8.5–10.1)
CANCER AG27-29 SERPL-ACNC: 10 U/ML (ref 0–39)
CEA SERPL-MCNC: 0.9 UG/L (ref 0–2.5)
CHLORIDE SERPL-SCNC: 111 MMOL/L (ref 94–109)
CO2 SERPL-SCNC: 25 MMOL/L (ref 20–32)
CREAT SERPL-MCNC: 0.73 MG/DL (ref 0.52–1.04)
DIFFERENTIAL METHOD BLD: ABNORMAL
EOSINOPHIL # BLD AUTO: 0.3 10E9/L (ref 0–0.7)
EOSINOPHIL NFR BLD AUTO: 4.4 %
ERYTHROCYTE [DISTWIDTH] IN BLOOD BY AUTOMATED COUNT: 13.4 % (ref 10–15)
GFR SERPL CREATININE-BSD FRML MDRD: 80 ML/MIN/1.7M2
GLUCOSE SERPL-MCNC: 86 MG/DL (ref 70–99)
HCT VFR BLD AUTO: 36.3 % (ref 35–47)
HGB BLD-MCNC: 11.2 G/DL (ref 11.7–15.7)
IMM GRANULOCYTES # BLD: 0 10E9/L (ref 0–0.4)
IMM GRANULOCYTES NFR BLD: 0.2 %
LYMPHOCYTES # BLD AUTO: 2.2 10E9/L (ref 0.8–5.3)
LYMPHOCYTES NFR BLD AUTO: 37 %
MCH RBC QN AUTO: 29.9 PG (ref 26.5–33)
MCHC RBC AUTO-ENTMCNC: 30.9 G/DL (ref 31.5–36.5)
MCV RBC AUTO: 97 FL (ref 78–100)
MONOCYTES # BLD AUTO: 0.4 10E9/L (ref 0–1.3)
MONOCYTES NFR BLD AUTO: 6.8 %
NEUTROPHILS # BLD AUTO: 3 10E9/L (ref 1.6–8.3)
NEUTROPHILS NFR BLD AUTO: 51.3 %
NRBC # BLD AUTO: 0 10*3/UL
NRBC BLD AUTO-RTO: 0 /100
PLATELET # BLD AUTO: 183 10E9/L (ref 150–450)
POTASSIUM SERPL-SCNC: 4 MMOL/L (ref 3.4–5.3)
PROT SERPL-MCNC: 7 G/DL (ref 6.8–8.8)
RBC # BLD AUTO: 3.74 10E12/L (ref 3.8–5.2)
SODIUM SERPL-SCNC: 142 MMOL/L (ref 133–144)
WBC # BLD AUTO: 5.9 10E9/L (ref 4–11)

## 2018-05-08 PROCEDURE — 96413 CHEMO IV INFUSION 1 HR: CPT

## 2018-05-08 PROCEDURE — 86300 IMMUNOASSAY TUMOR CA 15-3: CPT | Performed by: INTERNAL MEDICINE

## 2018-05-08 PROCEDURE — T1013 SIGN LANG/ORAL INTERPRETER: HCPCS | Mod: U3

## 2018-05-08 PROCEDURE — T1013 SIGN LANG/ORAL INTERPRETER: HCPCS | Mod: U3,ZF

## 2018-05-08 PROCEDURE — 85025 COMPLETE CBC W/AUTO DIFF WBC: CPT | Performed by: INTERNAL MEDICINE

## 2018-05-08 PROCEDURE — 25000128 H RX IP 250 OP 636: Mod: ZF | Performed by: INTERNAL MEDICINE

## 2018-05-08 PROCEDURE — 82378 CARCINOEMBRYONIC ANTIGEN: CPT | Performed by: INTERNAL MEDICINE

## 2018-05-08 PROCEDURE — G0463 HOSPITAL OUTPT CLINIC VISIT: HCPCS | Mod: ZF

## 2018-05-08 PROCEDURE — 80053 COMPREHEN METABOLIC PANEL: CPT | Performed by: INTERNAL MEDICINE

## 2018-05-08 PROCEDURE — 99214 OFFICE O/P EST MOD 30 MIN: CPT | Mod: ZP | Performed by: PHYSICIAN ASSISTANT

## 2018-05-08 PROCEDURE — 25000128 H RX IP 250 OP 636: Mod: ZF | Performed by: PHYSICIAN ASSISTANT

## 2018-05-08 RX ORDER — HEPARIN SODIUM (PORCINE) LOCK FLUSH IV SOLN 100 UNIT/ML 100 UNIT/ML
5 SOLUTION INTRAVENOUS EVERY 8 HOURS
Status: DISCONTINUED | OUTPATIENT
Start: 2018-05-08 | End: 2018-05-08 | Stop reason: HOSPADM

## 2018-05-08 RX ORDER — HEPARIN SODIUM (PORCINE) LOCK FLUSH IV SOLN 100 UNIT/ML 100 UNIT/ML
5 SOLUTION INTRAVENOUS
Status: COMPLETED | OUTPATIENT
Start: 2018-05-08 | End: 2018-05-08

## 2018-05-08 RX ADMIN — SODIUM CHLORIDE 250 ML: 9 INJECTION, SOLUTION INTRAVENOUS at 09:50

## 2018-05-08 RX ADMIN — TRASTUZUMAB 450 MG: 150 INJECTION, POWDER, LYOPHILIZED, FOR SOLUTION INTRAVENOUS at 09:51

## 2018-05-08 RX ADMIN — SODIUM CHLORIDE, PRESERVATIVE FREE 5 ML: 5 INJECTION INTRAVENOUS at 08:13

## 2018-05-08 RX ADMIN — Medication 5 ML: at 10:23

## 2018-05-08 ASSESSMENT — PAIN SCALES - GENERAL: PAINLEVEL: NO PAIN (0)

## 2018-05-08 NOTE — PROGRESS NOTES
Oncology/Hematology Visit Note  May 8, 2018    Reason for Visit: follow up of ER positive, HER2 positive left metastatic breast cancer    History of Present Illness: Hoda Brush is a 63 year old female with metastatic ER positive, HER 2 positive left breast cancer with bone mets.     TREATMENT HISTORY:  A. Initial diagnosis with metastatic breast cancer in [facility].  Neoadjuvant CAF x 6.   B. Left mastectomy. Left axillary node dissection.  C.  Radiation in 1 dose to R iliac region. g Gy.  C. Herceptin for 2 years taxane for a prescribed course then stopped, monthly zoledronic acid.  She had 2 years of Herceptin with tamoxifen added after chemotherapy.  D.  Tamoxifen alone and zoledronic acid every 3 months.  E.  [place] restarted Herceptin every 3 weeks and continued tamoxifen. Bone targeted agent changed to denosumab every 6 weeks.     She was admitted 9/20-9/21 with LUE cellulitis with leukocytosis. Given a dose of IV Ancef and discharged on keflex. Last restaging was 3/26/18 with CT CAP showing stable disease..      She presents prior to her next dose of Herceptin.      Interval History:  Hoda has been feeling overall well. Denies any recent changes in her health. Tolerating Tamoxifen well. Denies any vaginal bleeding. Going for walks with her  now that weather has improved. Denies any chest pain, dyspnea. Lymphedema in left arm sometimes increased with warmer weather. She is taking calcium + vitamin D.  Her 10-point review of systems is otherwise negative.     Current Outpatient Prescriptions   Medication Sig Dispense Refill     tamoxifen (NOLVADEX) 20 MG tablet Take 1 tablet (20 mg) by mouth daily 90 tablet 3     VITAMIN D, CHOLECALCIFEROL, PO Take 1,000 Units by mouth daily       albuterol (PROAIR HFA/PROVENTIL HFA/VENTOLIN HFA) 108 (90 BASE) MCG/ACT Inhaler Inhale 2 puffs into the lungs every 6 hours as needed for shortness of breath / dyspnea or wheezing (Patient not taking:  Reported on 3/6/2018) 1 Inhaler 0     benzonatate (TESSALON) 200 MG capsule Take 1 capsule (200 mg) by mouth 3 times daily as needed for cough (Patient not taking: Reported on 2/13/2018) 21 capsule 0     guaiFENesin-codeine (ROBITUSSIN AC) 100-10 MG/5ML SOLN solution Take 5-10 mLs by mouth every 6 hours as needed for cough (Patient not taking: Reported on 2/13/2018) 240 mL 0     order for DME L UE class 2 compression sleeve and gauntlet  Night garment  Bandaging supplies (Patient not taking: Reported on 2/13/2018) 1 each 1       Physical Examination:  General: The patient is a pleasant female in no acute distress.  /70 (BP Location: Right arm, Patient Position: Sitting, Cuff Size: Adult Regular)  Pulse 81  Temp 98.2  F (36.8  C) (Oral)  Resp 16  Wt 79.7 kg (175 lb 12.8 oz)  SpO2 95%  BMI 32.15 kg/m2  Wt Readings from Last 10 Encounters:   05/08/18 79.7 kg (175 lb 12.8 oz)   04/17/18 79.1 kg (174 lb 6.4 oz)   03/27/18 78.9 kg (173 lb 14.4 oz)   03/06/18 78 kg (172 lb)   02/13/18 79.7 kg (175 lb 12.8 oz)   01/25/18 79.8 kg (176 lb)   01/24/18 79.7 kg (175 lb 12.8 oz)   01/05/18 80.6 kg (177 lb 9.6 oz)   12/12/17 79.2 kg (174 lb 11.2 oz)   11/22/17 78.8 kg (173 lb 12.8 oz)     HEENT: EOMI, PERRL. Sclerae are anicteric. Oral mucosa is pink and moist with no lesions or thrush.   Lymph: No lymphadenopathy in the cervical, supraclavicular or axillary areas. Left arm with some non-pitting edema compared to right.  Breast: Deferred today  Heart: Regular rate and rhythm.   Lungs: Clear to auscultation bilaterally.   Abdomen: Bowel sounds present, soft, nontender, nondistended  Extremities: No lower extremity edema noted bilaterally.   Neuro: Cranial nerves II through XII are grossly intact.  Skin: No rashes, petechiae, or bruising noted on exposed skin.    Laboratory Data:  Results for NATASHA LOUIS (MRN [id number]) as of 5/8/2018 09:20   5/8/2018 08:21   Sodium 142   Potassium 4.0   Chloride 111 (H)    Carbon Dioxide 25   Urea Nitrogen 16   Creatinine 0.73   GFR Estimate 80   GFR Estimate If Black >90   Calcium 8.4 (L)   Anion Gap 6   Albumin 3.0 (L)   Protein Total 7.0   Bilirubin Total 0.2   Alkaline Phosphatase 35 (L)   ALT 17   AST 18   Glucose 86   WBC 5.9   Hemoglobin 11.2 (L)   Hematocrit 36.3   Platelet Count 183   RBC Count 3.74 (L)   MCV 97   MCH 29.9   MCHC 30.9 (L)   RDW 13.4   Diff Method Automated Method   % Neutrophils 51.3   % Lymphocytes 37.0   % Monocytes 6.8   % Eosinophils 4.4   % Basophils 0.3   % Immature Granulocytes 0.2   Nucleated RBCs 0   Absolute Neutrophil 3.0   Absolute Lymphocytes 2.2   Absolute Monocytes 0.4   Absolute Eosinophils 0.3   Absolute Basophils 0.0   Abs Immature Granulocytes 0.0   Absolute Nucleated RBC 0.0     CEA and CA 27-29 pending  Assessment and Plan:     1. Metastatic breast cancer, ER, [place], HER2+ positive: Was last treated in [facility] with Herceptin. We have continued Herceptin every 3 weeks as well as daily tamoxifen. She tolerates treatment extremely well without overt symptoms. CT CAP on 3/26/18 with stable disease. Echocardiogram 3/26/2018 with EF 60-65% and normal LV function.  Continue Herceptin every 3 weeks. Continue Tamoxifen.   --Scheduled for Herceptin on 5/29 with myself and labs prior, on 6/19 with Dr. Aguiar and labs prior  --Will schedule Echo in June prior to Dr. Aguiar    2. Bone metastasis: Continue xgeva every 6 weeks. Takes calcium daily, unsure of dose. She is on vitamin D3 1000 IU daily. Last Xgeva on 4/17  --Next dose due 5/29     3. LUE lymphedema: She completed lymphedema treatment and has a sleeve to use now.     4. Genetic testing: negative    5. Anemia: Mild, normocytic. Negative colonoscopy (2/1/18). EGD with biopsies (2/16/18) that showed gastritis, no H pylori, intestinal metaplasia or dysplasia. Hgb 11.2 today, stable    Jossie Sparrow PA-C  [facility] [address] [place], [place] [address] [phone]

## 2018-05-08 NOTE — NURSING NOTE
"Oncology Rooming Note    May 8, 2018 8:42 AM   Hoda Brush is a 63 year old female who presents for:    Chief Complaint   Patient presents with     Port Draw     port accessed and labs drawn by rn.  vs taken.     Oncology Clinic Visit     Return for Breast Ca , labs, Tx     Initial Vitals: /70 (BP Location: Right arm, Patient Position: Sitting, Cuff Size: Adult Regular)  Pulse 81  Temp 98.2  F (36.8  C) (Oral)  Resp 16  Wt 79.7 kg (175 lb 12.8 oz)  SpO2 95%  BMI 32.15 kg/m2 Estimated body mass index is 32.15 kg/(m^2) as calculated from the following:    Height as of 4/17/18: 1.575 m (5' 2\").    Weight as of this encounter: 79.7 kg (175 lb 12.8 oz). Body surface area is 1.87 meters squared.  No Pain (0) Comment: Data Unavailable   No LMP recorded. Patient is postmenopausal.  Allergies reviewed: Yes  Medications reviewed: Yes    Medications: Medication refills not needed today.  Pharmacy name entered into [employer]:    [id number] [place] [facility] - [place], [place] - [address] [id number] - [place], [place] - [address] AT Located within [facility] & [address] [place] [address] [id number] - [place], [place] - [address] AT  [address] [facility]    Clinical concerns: results  Tatsumi  was notified.    6 minutes for nursing intake (face to face time)     Amanda Hernandez MA              "

## 2018-05-08 NOTE — PATIENT INSTRUCTIONS
Contact Numbers    [facility] Main Line: [phone]  [facility] Triage:  [phone]    Call triage with chills and/or temperature greater than or equal to 100.5, uncontrolled nausea/vomiting, diarrhea, constipation, dizziness, shortness of breath, chest pain, bleeding, unexplained bruising, or any new/concerning symptoms, questions/concerns.     If you are having any concerning symptoms or wish to speak to a provider before your next infusion visit, please call your care coordinator or triage to notify them so we can adequately serve you.       After Hours: [phone]    If after hours, weekends, or holidays, call main hospital  and ask for Oncology doctor on call.           May 2018   Jakub Monday Tuesday Wednesday Thursday Friday Saturday             1     2     3     4     5       6     7     8     [facility]    8:00 AM   (60 min.)   [facility]   [facility] Draw     [facility] RETURN    8:35 AM   (90 min.)   Jossie Sparrow PA   [facility]   10:00 AM   (75 min.)    ONCOLOGY INFUSION   [facility] 9     10     11     12       13     14     15     16     17     18     19       20     21     22     23     24     25     26       27     28     29     [facility] DRAW    8:15 AM   (15 min.)   [facility]   [facility] Draw     [facility] RETURN    8:35 AM   (50 min.)   Jossie Sparrow PA   [facility]     [facility] ONC INFUSION 60   10:00 AM   (60 min.)    ONCOLOGY INFUSION   [facility] 30 31 June 2018 Sunday Monday Tuesday Wednesday Thursday Friday Saturday                            1     2       3     4     5     6     7     8     9       10     11     12     13     14     15     16       17     18     19     [facility] DRAW   10:30 AM   (15 min.)   [facility]   [facility] Draw     ECH LIMITED   11:00 AM   (60 min.)    [id number]   [facility] Echo     [facility] RETURN   12:15 PM   (30 min.)   Lisandro Aguiar MD   [facility]     [facility] ONC INFUSION 60    1:30 PM   (60 min.)   [facility] ONCOLOGY INFUSION   [facility] 20     21     22     23       24     25     26     27     28     29     30                  Lab Results:  Recent Results (from the past 12 hour(s))   CBC with platelets differential    Collection Time: 05/08/18  8:21 AM   Result Value Ref Range    WBC 5.9 4.0 - 11.0 10e9/L    RBC Count 3.74 (L) 3.8 - 5.2 10e12/L    Hemoglobin 11.2 (L) 11.7 - 15.7 g/dL    Hematocrit 36.3 35.0 - 47.0 %    MCV 97 78 - 100 fl    MCH 29.9 26.5 - 33.0 pg    MCHC 30.9 (L) 31.5 - 36.5 g/dL    RDW 13.4 10.0 - 15.0 %    Platelet Count 183 150 - 450 10e9/L    Diff Method Automated Method     % Neutrophils 51.3 %    % Lymphocytes 37.0 %    % Monocytes 6.8 %    % Eosinophils 4.4 %    % Basophils 0.3 %    % Immature Granulocytes 0.2 %    Nucleated RBCs 0 0 /100    Absolute Neutrophil 3.0 1.6 - 8.3 10e9/L    Absolute Lymphocytes 2.2 0.8 - 5.3 10e9/L    Absolute Monocytes 0.4 0.0 - 1.3 10e9/L    Absolute Eosinophils 0.3 0.0 - 0.7 10e9/L    Absolute Basophils 0.0 0.0 - 0.2 10e9/L    Abs Immature Granulocytes 0.0 0 - 0.4 10e9/L    Absolute Nucleated RBC 0.0    Comprehensive metabolic panel    Collection Time: 05/08/18  8:21 AM   Result Value Ref Range    Sodium 142 133 - 144 mmol/L    Potassium 4.0 3.4 - 5.3 mmol/L    Chloride 111 (H) 94 - 109 mmol/L    Carbon Dioxide 25 20 - 32 mmol/L    Anion Gap 6 3 - 14 mmol/L    Glucose 86 70 - 99 mg/dL    Urea Nitrogen 16 7 - 30 mg/dL    Creatinine 0.73 0.52 - 1.04 mg/dL    GFR Estimate 80 >60 mL/min/1.7m2    GFR Estimate If Black >90 >60 mL/min/1.7m2    Calcium 8.4 (L) 8.5 - 10.1 mg/dL    Bilirubin Total 0.2 0.2 - 1.3 mg/dL    Albumin 3.0 (L) 3.4 - 5.0 g/dL    Protein Total 7.0 6.8 - 8.8 g/dL    Alkaline Phosphatase 35 (L) 40 - 150 U/L    ALT 17 0 - 50 U/L    AST 18 0 - 45 U/L   CA  27.29 Breast tumor marker    Collection Time: 05/08/18  8:21 AM   Result Value Ref Range    CA 27-29 10 0 - 39 U/mL   CEA    Collection Time: 05/08/18  8:21 AM   Result Value Ref Range    CEA 0.9 0 - 2.5 ug/L

## 2018-05-08 NOTE — PROGRESS NOTES
Infusion Nursing Note:  Hoda Brush presents today for cycle 12, day 1 Herceptin.    Patient seen by provider today: Yes: SEMAJ Escobar   present during visit today: Yes, Russian    Note: N/A.    Intravenous Access:  Implanted Port.    Treatment Conditions:  Lab Results   Component Value Date    HGB 11.2 05/08/2018     Lab Results   Component Value Date    WBC 5.9 05/08/2018      Lab Results   Component Value Date    ANEU 3.0 05/08/2018     Lab Results   Component Value Date     05/08/2018      Lab Results   Component Value Date     05/08/2018                   Lab Results   Component Value Date    POTASSIUM 4.0 05/08/2018            Lab Results   Component Value Date    CR 0.73 05/08/2018                   Lab Results   Component Value Date    TAYLER 8.4 05/08/2018                Lab Results   Component Value Date    BILITOTAL 0.2 05/08/2018           Lab Results   Component Value Date    ALBUMIN 3.0 05/08/2018                    Lab Results   Component Value Date    ALT 17 05/08/2018           Lab Results   Component Value Date    AST 18 05/08/2018     Results reviewed, labs MET treatment parameters, ok to proceed with treatment.  ECHO/MUGA completed 3/26/2018  EF 60-65%.    Post Infusion Assessment:  Patient tolerated infusion without incident.  Blood return noted pre and post infusion.  Site patent and intact, free from redness, edema or discomfort.  No evidence of extravasations.  Access discontinued per protocol.    Discharge Plan:   Patient declined prescription refills.  Discharge instructions reviewed with: Patient.  Patient and/or family verbalized understanding of discharge instructions and all questions answered.  AVS to patient via [employer].  Patient will return 5/29/2018 for next appointment.   Patient discharged in stable condition accompanied by: .  Departure Mode: Ambulatory.    Gulshan Esquivel RN

## 2018-05-08 NOTE — NURSING NOTE
"Chief Complaint   Patient presents with     Port Draw     port accessed and labs drawn by rn.  vs taken.     Port accessed with 20g 3/4\" gripper needle, labs drawn, port flushed with saline and heparin, vitals checked, pt checked in for next appointment.  Sarita Elliott RN    "

## 2018-05-08 NOTE — MR AVS SNAPSHOT
After Visit Summary   5/8/2018    Hoda Brush    MRN: [id number]           Patient Information     Date Of Birth          1955        Visit Information        Provider Department      5/8/2018 10:00 AM Wesley Iraheta;  29 ATC;  ONCOLOGY INFUSION  Services Department        Today's Diagnoses     Malignant neoplasm of left female breast, unspecified estrogen receptor status, unspecified site of breast (H)    -  1      Care Instructions    Contact Numbers    [facility] [place] [place]: [phone]  [facility] Triage:  [phone]    Call triage with chills and/or temperature greater than or equal to 100.5, uncontrolled nausea/vomiting, diarrhea, constipation, dizziness, shortness of breath, chest pain, bleeding, unexplained bruising, or any new/concerning symptoms, questions/concerns.     If you are having any concerning symptoms or wish to speak to a provider before your next infusion visit, please call your care coordinator or triage to notify them so we can adequately serve you.       After Hours: [phone]    If after hours, weekends, or holidays, call main hospital  and ask for Oncology doctor on call.           May 2018   Jakub Monday Tuesday Wednesday Thursday Friday Saturday             1     2     3     4     5       6     7     8     [facility] LAB DRAW    8:00 AM   (60 min.)    MASONIC LAB DRAW   [facility] Lab Draw     [facility] RETURN    8:35 AM   (90 min.)   Jossie Sparrow PA M [facility] ONC INFUSION 60   10:00 AM   (75 min.)    ONCOLOGY INFUSION   [facility] 9     10     11     12       13     14     15     16     17     18     19       20     21     22     23     24     25     26       27     28     29     [facility] LAB DRAW    8:15 AM   (15 min.)    [facility]   [facility]     [facility] RETURN    8:35 AM   (50 min.)   Jossie Sparrow PA M [facility] ONC  INFUSION 60   10:00 AM   (60 min.)    ONCOLOGY INFUSION   [facility] 30 31 June 2018 Sunday Monday Tuesday Wednesday Thursday Friday Saturday                            1     2       3     4     5     6     7     8     9       10     11     12     13     14     15     16       17     18     19     [facility] MAS[facility] LAB DRAW   10:30 AM   (15 min.)   [facility] LAB DRAW   [facility] Lab Draw     ECH LIMITED   11:00 AM   (60 min.)   UCECHCR2   [facility]     [facility] RETURN   12:15 PM   (30 min.)   Lisandro Aguiar MD   [facility]     UM ONC INFUSION 60    1:30 PM   (60 min.)    ONCOLOGY INFUSION   [facility] 20 21     22     23       24     25     26     27     28     29     30                  Lab Results:  Recent Results (from the past 12 hour(s))   CBC with platelets differential    Collection Time: 05/08/18  8:21 AM   Result Value Ref Range    WBC 5.9 4.0 - 11.0 10e9/L    RBC Count 3.74 (L) 3.8 - 5.2 10e12/L    Hemoglobin 11.2 (L) 11.7 - 15.7 g/dL    Hematocrit 36.3 35.0 - 47.0 %    MCV 97 78 - 100 fl    MCH 29.9 26.5 - 33.0 pg    MCHC 30.9 (L) 31.5 - 36.5 g/dL    RDW 13.4 10.0 - 15.0 %    Platelet Count 183 150 - 450 10e9/L    Diff Method Automated Method     % Neutrophils 51.3 %    % Lymphocytes 37.0 %    % Monocytes 6.8 %    % Eosinophils 4.4 %    % Basophils 0.3 %    % Immature Granulocytes 0.2 %    Nucleated RBCs 0 0 /100    Absolute Neutrophil 3.0 1.6 - 8.3 10e9/L    Absolute Lymphocytes 2.2 0.8 - 5.3 10e9/L    Absolute Monocytes 0.4 0.0 - 1.3 10e9/L    Absolute Eosinophils 0.3 0.0 - 0.7 10e9/L    Absolute Basophils 0.0 0.0 - 0.2 10e9/L    Abs Immature Granulocytes 0.0 0 - 0.4 10e9/L    Absolute Nucleated RBC 0.0    Comprehensive metabolic panel    Collection Time: 05/08/18  8:21 AM   Result Value Ref Range    Sodium 142 133 - 144 mmol/L    Potassium 4.0 3.4 - 5.3 mmol/L    Chloride 111 (H) 94 - 109  mmol/L    Carbon Dioxide 25 20 - 32 mmol/L    Anion Gap 6 3 - 14 mmol/L    Glucose 86 70 - 99 mg/dL    Urea Nitrogen 16 7 - 30 mg/dL    Creatinine 0.73 0.52 - 1.04 mg/dL    GFR Estimate 80 >60 mL/min/1.7m2    GFR Estimate If Black >90 >60 mL/min/1.7m2    Calcium 8.4 (L) 8.5 - 10.1 mg/dL    Bilirubin Total 0.2 0.2 - 1.3 mg/dL    Albumin 3.0 (L) 3.4 - 5.0 g/dL    Protein Total 7.0 6.8 - 8.8 g/dL    Alkaline Phosphatase 35 (L) 40 - 150 U/L    ALT 17 0 - 50 U/L    AST 18 0 - 45 U/L   CA 27.29 Breast tumor marker    Collection Time: 05/08/18  8:21 AM   Result Value Ref Range    CA 27-29 10 0 - 39 U/mL   CEA    Collection Time: 05/08/18  8:21 AM   Result Value Ref Range    CEA 0.9 0 - 2.5 ug/L               Follow-ups after your visit        Your next 10 appointments already scheduled     May 29, 2018  8:15 AM CDT   [facility] Lab Draw with  [facility] LAB DRAW   [facility]    [address] [place] [address]  [address]  [facility] [address]   [phone]            May 29, 2018  8:50 AM CDT   (Arrive by 8:35 AM)   Return Visit with SEMAJ Eddy   [facility] ([facility])    [address] [place] [address]  [address]  [facility] [address]   [phone]            May 29, 2018 10:00 AM CDT   Infusion 60 with UC ONCOLOGY INFUSION, UC 29 ATC   [facility] ([facility])    [address] [place] [address]  [address]  [facility] [address]   [phone]            Jun 19, 2018 10:30 AM CDT   M[facility]ic Lab Draw with UC MASONIC LAB DRAW   [facility] Lab Draw ([facility])    [address] [place] [address]  [address]  [facility] [address]   [phone]            Jun 19, 2018 11:00 AM CDT   Ech Limited with UCECHCR2   [facility] ([facility])    [address]  3rd Floor  [facility] [address] [phone]           1.  Please bring or wear a  comfortable two-piece outfit. 2.  You may eat, drink and take your normal medicines. 3.  For any questions that cannot be answered, please contact the ordering physician            Jun 19, 2018 12:30 PM CDT   (Arrive by 12:15 PM)   Return Visit with Lisandro Aguiar MD   [facility] ([facility])    [address] [place] [address]  [address]  [facility] [address] [phone]            Jun 19, 2018  1:30 PM CDT   Infusion 60 with UC ONCOLOGY INFUSION, UC 28 ATC   [facility] ([facility])    [address] [place] [address]  [address]  [facility] [address] [phone]              Future tests that were ordered for you today     Open Future Orders        Priority Expected Expires Ordered    Echocardiogram Limited Routine 6/19/2018 5/8/2019 5/8/2018            Who to contact     If you have questions or need follow up information about today's clinic visit or your schedule please contact [facility] directly at [phone].  Normal or non-critical lab and imaging results will be communicated to you by Rubicon Projecthart, letter or phone within 4 business days after the clinic has received the results. If you do not hear from us within 7 days, please contact the clinic through prolliet or phone. If you have a critical or abnormal lab result, we will notify you by phone as soon as possible.  Submit refill requests through Spreaker or call your pharmacy and they will forward the refill request to us. Please allow 3 business days for your refill to be completed.          Additional Information About Your Visit        Spreaker Information     Spreaker gives you secure access to your electronic health record. If you see a primary care provider, you can also send messages to your care team and make appointments. If you have questions, please call your primary care clinic.  If you do not have a primary care provider, please call  [phone] and they will assist you.        Care EveryWhere ID     This is your Care EveryWhere ID. This could be used by other organizations to access your [place] medical records  [id number]         Blood Pressure from Last 3 Encounters:   05/08/18 121/70   04/17/18 115/64   03/27/18 108/73    Weight from Last 3 Encounters:   05/08/18 79.7 kg (175 lb 12.8 oz)   04/17/18 79.1 kg (174 lb 6.4 oz)   03/27/18 78.9 kg (173 lb 14.4 oz)              We Performed the Following     CA 27.29 Breast tumor marker     CBC with platelets differential     CEA     Comprehensive metabolic panel        Primary Care Provider Fax #    Physician No Ref-Primary [phone]       No address on file        Equal Access to Services     NOE VAZQUEZ : Luz Marina Ramsey, wamikki phillipsqadaha, qaybta kaalmada franky, alexa angulo . So [facility] [phone].    ATENCIÓN: Si habla español, tiene a lagunas disposición servicios gratuitos de asistencia lingüística. Llame al [phone].    We comply with applicable federal civil rights laws and [place] laws. We do not discriminate on the basis of race, color, national origin, age, disability, sex, sexual orientation, or gender identity.            Thank you!     Thank you for choosing [facility] [place]  for your care. Our goal is always to provide you with excellent care. Hearing back from our patients is one way we can continue to improve our services. Please take a few minutes to complete the written survey that you may receive in the mail after your visit with us. Thank you!             Your Updated Medication List - Protect others around you: Learn how to safely use, store and throw away your medicines at www.disposemymeds.org.          This list is accurate as of 5/8/18 10:42 AM.  Always use your most recent med list.                   Brand Name Dispense Instructions for use Diagnosis    albuterol 108 (90 Base) MCG/ACT Inhaler    PROAIR  HFA/PROVENTIL HFA/VENTOLIN HFA    1 Inhaler    Inhale 2 puffs into the lungs every 6 hours as needed for shortness of breath / dyspnea or wheezing    Cough       benzonatate 200 MG capsule    TESSALON    21 capsule    Take 1 capsule (200 mg) by mouth 3 times daily as needed for cough    Cough       guaiFENesin-codeine 100-10 MG/5ML Soln solution    ROBITUSSIN AC    240 mL    Take 5-10 mLs by mouth every 6 hours as needed for cough    Cough       order for DME     1 each    L UE class 2 compression sleeve and gauntlet Night garment Bandaging supplies    Lymphedema of left upper extremity       tamoxifen 20 MG tablet    NOLVADEX    90 tablet    Take 1 tablet (20 mg) by mouth daily    Cancer of central portion of left breast (H)       VITAMIN D (CHOLECALCIFEROL) PO      Take 1,000 Units by mouth daily

## 2018-05-08 NOTE — MR AVS SNAPSHOT
After Visit Summary   5/8/2018    Hoda Brush    MRN: [id number]           Patient Information     Date Of Birth          1955        Visit Information        Provider Department      5/8/2018 8:35 AM Wesley Iraheta; Jossie Sparrow PA [facility]        Today's Diagnoses     Malignant neoplasm of left female breast, unspecified estrogen receptor status, unspecified site of breast (H)    -  1       Follow-ups after your visit        Your next 10 appointments already scheduled     May 08, 2018 10:00 AM CDT   Infusion 60 with UC ONCOLOGY INFUSION, UC 29 ATC   [facility] ([facility])    [address]  [address]  [facility] [address]   [phone]            May 29, 2018  8:15 AM CDT   Masonic Lab Draw with  MASONIC LAB DRAW   [facility] Lab Draw ([facility])    [address] [place] [address]  [address]  [facility] [address]   [phone]            May 29, 2018  8:50 AM CDT   (Arrive by 8:35 AM)   Return Visit with SEMAJ Eddy   [facility] ([facility])    [address] [place] [address]  [address]  [facility] [address]   [phone]            May 29, 2018 10:00 AM CDT   Infusion 60 with UC ONCOLOGY INFUSION, UC 29 ATC   [facility] ([facility])    [address]  [address]  [facility] [address]   [phone]            Jun 19, 2018 12:00 PM CDT   Masonic Lab Draw with UC MASONIC LAB DRAW   [facility] Lab Draw ([facility])    [address] [place] [address]  [address]  [facility] [address]   [phone]            Jun 19, 2018 12:30 PM CDT   (Arrive by 12:15 PM)   Return Visit with Lisandro Aguiar MD   [facility] ([facility])    [address] [place] [address]  [address]  [facility] [address]   [phone]             Jun 19, 2018  1:30 PM CDT   Infusion 60 with UC ONCOLOGY INFUSION, UC 28 ATC   [facility] ([facility])    [address]  [address]  [facility] [address] [phone]              Future tests that were ordered for you today     Open Future Orders        Priority Expected Expires Ordered    Echocardiogram Limited Routine 6/19/2018 5/8/2019 5/8/2018            Who to contact     If you have questions or need follow up information about today's clinic visit or your schedule please contact [facility] directly at [phone].  Normal or non-critical lab and imaging results will be communicated to you by Balloonhart, letter or phone within 4 business days after the clinic has received the results. If you do not hear from us within 7 days, please contact the clinic through MAZt or phone. If you have a critical or abnormal lab result, we will notify you by phone as soon as possible.  Submit refill requests through [employer] or call your pharmacy and they will forward the refill request to us. Please allow 3 business days for your refill to be completed.          Additional Information About Your Visit        Balloonhart Information     [employer] gives you secure access to your electronic health record. If you see a primary care provider, you can also send messages to your care team and make appointments. If you have questions, please call your primary care clinic.  If you do not have a primary care provider, please call [phone] and they will assist you.        Care EveryWhere ID     This is your Care EveryWhere ID. This could be used by other organizations to access your [place] medical records  [id number]        Your Vitals Were     Pulse Temperature Respirations Pulse Oximetry BMI (Body Mass Index)       81 98.2  F (36.8  C) (Oral) 16 95% 32.15 kg/m2        Blood Pressure from Last 3 Encounters:   05/08/18 121/70   04/17/18 115/64    03/27/18 108/73    Weight from Last 3 Encounters:   05/08/18 79.7 kg (175 lb 12.8 oz)   04/17/18 79.1 kg (174 lb 6.4 oz)   03/27/18 78.9 kg (173 lb 14.4 oz)               Primary Care Provider Fax #    Physician No Ref-Primary [phone]       No address on file        Equal Access to Services     TONEY VAZQUEZ : Hadii aad ku hadasho Soomaali, waaxda luqadaha, qaybta kaalmada adeegyada, waxay idiin hayaan adesarah reeder laquocnba . So [facility] [phone].    ATENCIÓN: Si habla español, tiene a lagunas disposición servicios gratuitos de asistencia lingüística. Llame al [phone].    We comply with applicable federal civil rights laws and [place] laws. We do not discriminate on the basis of race, color, national origin, age, disability, sex, sexual orientation, or gender identity.            Thank you!     Thank you for choosing [facility]  for your care. Our goal is always to provide you with excellent care. Hearing back from our patients is one way we can continue to improve our services. Please take a few minutes to complete the written survey that you may receive in the mail after your visit with us. Thank you!             Your Updated Medication List - Protect others around you: Learn how to safely use, store and throw away your medicines at www.disposemymeds.org.          This list is accurate as of 5/8/18  9:21 AM.  Always use your most recent med list.                   Brand Name Dispense Instructions for use Diagnosis    albuterol 108 (90 Base) MCG/ACT Inhaler    PROAIR HFA/PROVENTIL HFA/VENTOLIN HFA    1 Inhaler    Inhale 2 puffs into the lungs every 6 hours as needed for shortness of breath / dyspnea or wheezing    Cough       benzonatate 200 MG capsule    TESSALON    21 capsule    Take 1 capsule (200 mg) by mouth 3 times daily as needed for cough    Cough       guaiFENesin-codeine 100-10 MG/5ML Soln solution    ROBITUSSIN AC    240 mL    Take 5-10 mLs by mouth every 6 hours as needed for  cough    Cough       order for DME     1 each    L UE class 2 compression sleeve and gauntlet Night garment Bandaging supplies    Lymphedema of left upper extremity       tamoxifen 20 MG tablet    NOLVADEX    90 tablet    Take 1 tablet (20 mg) by mouth daily    Cancer of central portion of left breast (H)       VITAMIN D (CHOLECALCIFEROL) PO      Take 1,000 Units by mouth daily

## 2018-05-24 RX ORDER — ACETAMINOPHEN 325 MG/1
650 TABLET ORAL
Status: CANCELLED | OUTPATIENT
Start: 2018-05-29

## 2018-05-24 RX ORDER — HEPARIN SODIUM (PORCINE) LOCK FLUSH IV SOLN 100 UNIT/ML 100 UNIT/ML
5 SOLUTION INTRAVENOUS EVERY 8 HOURS
Status: CANCELLED | OUTPATIENT
Start: 2018-05-29

## 2018-05-24 RX ORDER — DIPHENHYDRAMINE HYDROCHLORIDE 50 MG/ML
50 INJECTION INTRAMUSCULAR; INTRAVENOUS
Status: CANCELLED
Start: 2018-05-29

## 2018-05-24 RX ORDER — LORAZEPAM 2 MG/ML
0.5 INJECTION INTRAMUSCULAR EVERY 4 HOURS PRN
Status: CANCELLED
Start: 2018-05-29

## 2018-05-24 RX ORDER — EPINEPHRINE 0.3 MG/.3ML
0.3 INJECTION SUBCUTANEOUS EVERY 5 MIN PRN
Status: CANCELLED | OUTPATIENT
Start: 2018-05-29

## 2018-05-24 RX ORDER — MEPERIDINE HYDROCHLORIDE 25 MG/ML
25 INJECTION INTRAMUSCULAR; INTRAVENOUS; SUBCUTANEOUS EVERY 30 MIN PRN
Status: CANCELLED | OUTPATIENT
Start: 2018-05-29

## 2018-05-24 RX ORDER — ALBUTEROL SULFATE 90 UG/1
1-2 AEROSOL, METERED RESPIRATORY (INHALATION)
Status: CANCELLED
Start: 2018-05-29

## 2018-05-24 RX ORDER — ALBUTEROL SULFATE 0.83 MG/ML
2.5 SOLUTION RESPIRATORY (INHALATION)
Status: CANCELLED | OUTPATIENT
Start: 2018-05-29

## 2018-05-24 RX ORDER — SODIUM CHLORIDE 9 MG/ML
1000 INJECTION, SOLUTION INTRAVENOUS CONTINUOUS PRN
Status: CANCELLED
Start: 2018-05-29

## 2018-05-24 RX ORDER — EPINEPHRINE 1 MG/ML
0.3 INJECTION, SOLUTION INTRAMUSCULAR; SUBCUTANEOUS EVERY 5 MIN PRN
Status: CANCELLED | OUTPATIENT
Start: 2018-05-29

## 2018-05-24 RX ORDER — DIPHENHYDRAMINE HCL 25 MG
50 CAPSULE ORAL ONCE
Status: CANCELLED
Start: 2018-05-29

## 2018-05-24 RX ORDER — METHYLPREDNISOLONE SODIUM SUCCINATE 125 MG/2ML
125 INJECTION, POWDER, LYOPHILIZED, FOR SOLUTION INTRAMUSCULAR; INTRAVENOUS
Status: CANCELLED
Start: 2018-05-29

## 2018-05-25 NOTE — PROGRESS NOTES
Oncology/Hematology Visit Note  May 29, 2018    Reason for Visit: follow up of ER positive, HER2 positive left metastatic breast cancer    History of Present Illness: Hoda Brush is a 63 year old female with metastatic ER positive, HER 2 positive left breast cancer with bone mets.     TREATMENT HISTORY:  A. Initial diagnosis with metastatic breast cancer in [facility].  Neoadjuvant CAF x 6.   B. Left mastectomy. Left axillary node dissection.  C.  Radiation in 1 dose to R iliac region. g Gy.  C. Herceptin for 2 years taxane for a prescribed course then stopped, monthly zoledronic acid.  She had 2 years of Herceptin with tamoxifen added after chemotherapy.  D.  Tamoxifen alone and zoledronic acid every 3 months.  E.  [place]  We restarted Herceptin every 3 weeks and continued tamoxifen. Bone targeted agent changed to denosumab every 6 weeks.     She was admitted 9/20-9/21 with LUE cellulitis with leukocytosis. Given a dose of IV Ancef and discharged on keflex. Last restaging was 3/26/18 with CT CAP showing stable disease..      She presents prior to her next dose of Herceptin.      Interval History:  Hoda states she has been feeling overall well. She has not been walking recently due the hot weather this past weekend. She had her grandchildren over for a big party this holiday weekend. She states she is eating a healthful diet and that her daughter insists she eat more vegetables. She is taking calcium and vitamin D. She is tolerating tamoxifen well without any noticeable side effects. She denies any vaginal bleeding. Denies chest pain, dyspnea, dizziness, headaches, edema in legs.  Her 10-point review of systems is otherwise negative.     Current Outpatient Prescriptions   Medication Sig Dispense Refill     albuterol (PROAIR HFA/PROVENTIL HFA/VENTOLIN HFA) 108 (90 BASE) MCG/ACT Inhaler Inhale 2 puffs into the lungs every 6 hours as needed for shortness of breath / dyspnea or wheezing (Patient not  taking: Reported on 3/6/2018) 1 Inhaler 0     benzonatate (TESSALON) 200 MG capsule Take 1 capsule (200 mg) by mouth 3 times daily as needed for cough (Patient not taking: Reported on 2/13/2018) 21 capsule 0     guaiFENesin-codeine (ROBITUSSIN AC) 100-10 MG/5ML SOLN solution Take 5-10 mLs by mouth every 6 hours as needed for cough (Patient not taking: Reported on 2/13/2018) 240 mL 0     order for DME L UE class 2 compression sleeve and gauntlet  Night garment  Bandaging supplies (Patient not taking: Reported on 2/13/2018) 1 each 1     tamoxifen (NOLVADEX) 20 MG tablet Take 1 tablet (20 mg) by mouth daily 90 tablet 3     VITAMIN D, CHOLECALCIFEROL, PO Take 1,000 Units by mouth daily         Physical Examination:  General: The patient is a pleasant female in no acute distress.  There were no vitals taken for this visit.  Wt Readings from Last 10 Encounters:   05/08/18 79.7 kg (175 lb 12.8 oz)   04/17/18 79.1 kg (174 lb 6.4 oz)   03/27/18 78.9 kg (173 lb 14.4 oz)   03/06/18 78 kg (172 lb)   02/13/18 79.7 kg (175 lb 12.8 oz)   01/25/18 79.8 kg (176 lb)   01/24/18 79.7 kg (175 lb 12.8 oz)   01/05/18 80.6 kg (177 lb 9.6 oz)   12/12/17 79.2 kg (174 lb 11.2 oz)   11/22/17 78.8 kg (173 lb 12.8 oz)     HEENT: EOMI, PERRL. Sclerae are anicteric. Oral mucosa is pink and moist with no lesions or thrush.   Lymph: No lymphadenopathy in the cervical, supraclavicular or axillary areas. Left arm with some non-pitting edema compared to right.  Breast: Patient declined today  Heart: Regular rate and rhythm.   Lungs: Clear to auscultation bilaterally.   Abdomen: Bowel sounds present, soft, nontender, nondistended  Extremities: No lower extremity edema noted bilaterally.   Neuro: Cranial nerves II through XII are grossly intact.  Skin: No rashes, petechiae, or bruising noted on exposed skin.    Laboratory Data:  Results for NATASHA LOUIS (MRN [id number]) as of 5/29/2018 09:12   5/29/2018 08:33   Sodium 143   Potassium 4.2    Chloride 111 (H)   Carbon Dioxide 27   Urea Nitrogen 17   Creatinine 0.74   GFR Estimate 79   GFR Estimate If Black >90   Calcium 8.3 (L)   Anion Gap 5   Albumin 3.2 (L)   Protein Total 7.0   Bilirubin Total 0.3   Alkaline Phosphatase 31 (L)   ALT 19   AST 20   Glucose 83   WBC 6.5   Hemoglobin 11.6 (L)   Hematocrit 36.9   Platelet Count 192   RBC Count 3.81   MCV 97   MCH 30.4   MCHC 31.4 (L)   RDW 13.4   Diff Method Automated Method   % Neutrophils 50.3   % Lymphocytes 38.2   % Monocytes 6.9   % Eosinophils 4.1   % Basophils 0.5   % Immature Granulocytes 0.0   Nucleated RBCs 0   Absolute Neutrophil 3.3   Absolute Lymphocytes 2.5   Absolute Monocytes 0.5   Absolute Eosinophils 0.3   Absolute Basophils 0.0   Abs Immature Granulocytes 0.0   Absolute Nucleated RBC 0.0       Assessment and Plan:     1. Metastatic breast cancer, ER, [place], HER2+ positive: Was last treated in [facility] with Herceptin. We have continued Herceptin every 3 weeks as well as daily tamoxifen. She tolerates treatment extremely well without overt symptoms aside from some fatigue for 2 days after Herceptin. CT CAP on 3/26/18 with stable disease. Echocardiogram 3/26/2018 with EF 60-65% and normal LV function.  Continue Herceptin every 3 weeks. Continue Tamoxifen.   --Follow up on 6/19 with Dr. Aguiar. Echo and labs prior    2. Bone metastasis: Continue xgeva every 6 weeks. Takes calcium daily, unsure of dose. She is on vitamin D3 1000 IU daily. Xgeva due today  --Next dose due 7/10     3. LUE lymphedema: She completed lymphedema treatment and has a sleeve to use now.     4. Genetic testing: negative    5. Anemia: Mild, normocytic. Negative colonoscopy (2/1/18). EGD with biopsies (2/16/18) that showed gastritis, no H pylori, intestinal metaplasia or dysplasia. Hgb 11.6 today, stable    Jossie Sparrow PA-C  [facility]  [address] [place] [address]  [phone]

## 2018-05-29 ENCOUNTER — INFUSION THERAPY VISIT (OUTPATIENT)
Dept: ONCOLOGY | Facility: CLINIC | Age: 63
End: 2018-05-29
Attending: INTERNAL MEDICINE
Payer: COMMERCIAL

## 2018-05-29 VITALS
DIASTOLIC BLOOD PRESSURE: 58 MMHG | TEMPERATURE: 97.9 F | BODY MASS INDEX: 32.39 KG/M2 | WEIGHT: 176 LBS | OXYGEN SATURATION: 96 % | RESPIRATION RATE: 16 BRPM | HEIGHT: 62 IN | SYSTOLIC BLOOD PRESSURE: 111 MMHG | HEART RATE: 76 BPM

## 2018-05-29 DIAGNOSIS — C50.912 MALIGNANT NEOPLASM OF LEFT FEMALE BREAST, UNSPECIFIED ESTROGEN RECEPTOR STATUS, UNSPECIFIED SITE OF BREAST (H): Primary | ICD-10-CM

## 2018-05-29 DIAGNOSIS — C79.51 BONE METASTASIS: ICD-10-CM

## 2018-05-29 DIAGNOSIS — C50.919 METASTATIC BREAST CANCER: Primary | ICD-10-CM

## 2018-05-29 LAB
ALBUMIN SERPL-MCNC: 3.2 G/DL (ref 3.4–5)
ALP SERPL-CCNC: 31 U/L (ref 40–150)
ALT SERPL W P-5'-P-CCNC: 19 U/L (ref 0–50)
ANION GAP SERPL CALCULATED.3IONS-SCNC: 5 MMOL/L (ref 3–14)
AST SERPL W P-5'-P-CCNC: 20 U/L (ref 0–45)
BASOPHILS # BLD AUTO: 0 10E9/L (ref 0–0.2)
BASOPHILS NFR BLD AUTO: 0.5 %
BILIRUB SERPL-MCNC: 0.3 MG/DL (ref 0.2–1.3)
BUN SERPL-MCNC: 17 MG/DL (ref 7–30)
CALCIUM SERPL-MCNC: 8.3 MG/DL (ref 8.5–10.1)
CANCER AG27-29 SERPL-ACNC: 9 U/ML (ref 0–39)
CEA SERPL-MCNC: <0.5 UG/L (ref 0–2.5)
CHLORIDE SERPL-SCNC: 111 MMOL/L (ref 94–109)
CO2 SERPL-SCNC: 27 MMOL/L (ref 20–32)
CREAT SERPL-MCNC: 0.74 MG/DL (ref 0.52–1.04)
DIFFERENTIAL METHOD BLD: ABNORMAL
EOSINOPHIL # BLD AUTO: 0.3 10E9/L (ref 0–0.7)
EOSINOPHIL NFR BLD AUTO: 4.1 %
ERYTHROCYTE [DISTWIDTH] IN BLOOD BY AUTOMATED COUNT: 13.4 % (ref 10–15)
GFR SERPL CREATININE-BSD FRML MDRD: 79 ML/MIN/1.7M2
GLUCOSE SERPL-MCNC: 83 MG/DL (ref 70–99)
HCT VFR BLD AUTO: 36.9 % (ref 35–47)
HGB BLD-MCNC: 11.6 G/DL (ref 11.7–15.7)
IMM GRANULOCYTES # BLD: 0 10E9/L (ref 0–0.4)
IMM GRANULOCYTES NFR BLD: 0 %
LYMPHOCYTES # BLD AUTO: 2.5 10E9/L (ref 0.8–5.3)
LYMPHOCYTES NFR BLD AUTO: 38.2 %
MCH RBC QN AUTO: 30.4 PG (ref 26.5–33)
MCHC RBC AUTO-ENTMCNC: 31.4 G/DL (ref 31.5–36.5)
MCV RBC AUTO: 97 FL (ref 78–100)
MONOCYTES # BLD AUTO: 0.5 10E9/L (ref 0–1.3)
MONOCYTES NFR BLD AUTO: 6.9 %
NEUTROPHILS # BLD AUTO: 3.3 10E9/L (ref 1.6–8.3)
NEUTROPHILS NFR BLD AUTO: 50.3 %
NRBC # BLD AUTO: 0 10*3/UL
NRBC BLD AUTO-RTO: 0 /100
PLATELET # BLD AUTO: 192 10E9/L (ref 150–450)
POTASSIUM SERPL-SCNC: 4.2 MMOL/L (ref 3.4–5.3)
PROT SERPL-MCNC: 7 G/DL (ref 6.8–8.8)
RBC # BLD AUTO: 3.81 10E12/L (ref 3.8–5.2)
SODIUM SERPL-SCNC: 143 MMOL/L (ref 133–144)
WBC # BLD AUTO: 6.5 10E9/L (ref 4–11)

## 2018-05-29 PROCEDURE — 96413 CHEMO IV INFUSION 1 HR: CPT

## 2018-05-29 PROCEDURE — 99214 OFFICE O/P EST MOD 30 MIN: CPT | Mod: ZP | Performed by: PHYSICIAN ASSISTANT

## 2018-05-29 PROCEDURE — 82378 CARCINOEMBRYONIC ANTIGEN: CPT | Performed by: INTERNAL MEDICINE

## 2018-05-29 PROCEDURE — 86300 IMMUNOASSAY TUMOR CA 15-3: CPT | Performed by: INTERNAL MEDICINE

## 2018-05-29 PROCEDURE — 96372 THER/PROPH/DIAG INJ SC/IM: CPT | Mod: 59

## 2018-05-29 PROCEDURE — 25000128 H RX IP 250 OP 636: Mod: ZF | Performed by: PHYSICIAN ASSISTANT

## 2018-05-29 PROCEDURE — 80053 COMPREHEN METABOLIC PANEL: CPT | Performed by: INTERNAL MEDICINE

## 2018-05-29 PROCEDURE — T1013 SIGN LANG/ORAL INTERPRETER: HCPCS | Mod: U3

## 2018-05-29 PROCEDURE — 85025 COMPLETE CBC W/AUTO DIFF WBC: CPT | Performed by: INTERNAL MEDICINE

## 2018-05-29 PROCEDURE — 25000128 H RX IP 250 OP 636: Mod: ZF | Performed by: INTERNAL MEDICINE

## 2018-05-29 PROCEDURE — G0463 HOSPITAL OUTPT CLINIC VISIT: HCPCS | Mod: ZF

## 2018-05-29 RX ORDER — HEPARIN SODIUM (PORCINE) LOCK FLUSH IV SOLN 100 UNIT/ML 100 UNIT/ML
5 SOLUTION INTRAVENOUS EVERY 8 HOURS
Status: DISCONTINUED | OUTPATIENT
Start: 2018-05-29 | End: 2018-05-29 | Stop reason: HOSPADM

## 2018-05-29 RX ORDER — HEPARIN SODIUM (PORCINE) LOCK FLUSH IV SOLN 100 UNIT/ML 100 UNIT/ML
5 SOLUTION INTRAVENOUS ONCE
Status: COMPLETED | OUTPATIENT
Start: 2018-05-29 | End: 2018-05-29

## 2018-05-29 RX ADMIN — TRASTUZUMAB 450 MG: 150 INJECTION, POWDER, LYOPHILIZED, FOR SOLUTION INTRAVENOUS at 10:04

## 2018-05-29 RX ADMIN — SODIUM CHLORIDE 250 ML: 9 INJECTION, SOLUTION INTRAVENOUS at 10:04

## 2018-05-29 RX ADMIN — DENOSUMAB 120 MG: 120 INJECTION SUBCUTANEOUS at 09:44

## 2018-05-29 RX ADMIN — SODIUM CHLORIDE, PRESERVATIVE FREE 5 ML: 5 INJECTION INTRAVENOUS at 08:30

## 2018-05-29 RX ADMIN — SODIUM CHLORIDE, PRESERVATIVE FREE 5 ML: 5 INJECTION INTRAVENOUS at 10:35

## 2018-05-29 ASSESSMENT — PAIN SCALES - GENERAL: PAINLEVEL: NO PAIN (0)

## 2018-05-29 NOTE — PROGRESS NOTES
Infusion Nursing Note:  Hoda Brush presents today for Cycle 13 Day 1 Herceptin, Xgeva.    Patient seen by provider today: Yes: SEMAJ Escobar   present during visit today: Yes, Language: [place].     Note: N/A.    Intravenous Access:  Implanted Port.    Treatment Conditions:  Lab Results   Component Value Date    HGB 11.6 05/29/2018     Lab Results   Component Value Date    WBC 6.5 05/29/2018      Lab Results   Component Value Date    ANEU 3.3 05/29/2018     Lab Results   Component Value Date     05/29/2018      Lab Results   Component Value Date     05/29/2018                   Lab Results   Component Value Date    POTASSIUM 4.2 05/29/2018           No results found for: MAG         Lab Results   Component Value Date    CR 0.74 05/29/2018                   Lab Results   Component Value Date    TAYLER 8.3 05/29/2018                Lab Results   Component Value Date    BILITOTAL 0.3 05/29/2018           Lab Results   Component Value Date    ALBUMIN 3.2 05/29/2018                    Lab Results   Component Value Date    ALT 19 05/29/2018           Lab Results   Component Value Date    AST 20 05/29/2018       Results reviewed, labs MET treatment parameters, ok to proceed with treatment.  ECHO/MUGA completed 3/26/18  EF 60-65%.      Post Infusion Assessment:  Patient tolerated infusion without incident.  Patient tolerated injection to right arm without incident.  Blood return noted pre and post infusion.  Site patent and intact, free from redness, edema or discomfort.  No evidence of extravasations.  Access discontinued per protocol.    Discharge Plan:   Patient declined prescription refills.  Copy of AVS reviewed with patient and/or family.  Patient will return 6/19 for next appointment.  Patient discharged in stable condition accompanied by: self and .  Departure Mode: Ambulatory.    MICHELINE SHANKS RN

## 2018-05-29 NOTE — MR AVS SNAPSHOT
After Visit Summary   5/29/2018    Hoda Brush    MRN: [id number]           Patient Information     Date Of Birth          1955        Visit Information        Provider Department      5/29/2018 8:35 AM Sheeba Cifuentes; Jossie Sparrow PA [facility]        Today's Diagnoses     Metastatic breast cancer (H)    -  1       Follow-ups after your visit        Your next 10 appointments already scheduled     May 29, 2018 10:00 AM CDT   Infusion 60 with UC ONCOLOGY INFUSION, UC 29 ATC   [facility] ([facility])    [address] [place] [address]  [address]  [facility] [address] [phone]            Jun 19, 2018 10:30 AM CDT   [facility] Lab Draw with UC [facility] LAB DRAW   [facility] Lab Draw ([facility])    [address] [place] [address]  [address]  [facility] [id number] [phone]            Jun 19, 2018 11:00 AM CDT   Ech Limited with UCECHCR2   [facility] Echo ([facility])    [address] [place] [address]  3rd Floor  [facility] [id number] [phone]           1.  Please bring or wear a comfortable two-piece outfit. 2.  You may eat, drink and take your normal medicines. 3.  For any questions that cannot be answered, please contact the ordering physician            Jun 19, 2018 12:30 PM CDT   (Arrive by 12:15 PM)   Return Visit with Lisandro Aguiar MD   [facility] ([facility])    [address] [place] [address]  [address]  [facility] [id number] [phone]            Jun 19, 2018  1:30 PM CDT   Infusion 60 with UC ONCOLOGY INFUSION, UC 28 ATC   [facility] ([facility])    [address] [place] [address]  [address]  [facility] [id number] [phone]              Who to contact     If you have questions or need follow up information about today's clinic visit or your schedule please contact [facility] directly at [phone].  Normal or non-critical lab and imaging results will be communicated to you by MyChart, letter or phone within 4 business days after the clinic has received the results. If you do not hear from us within 7 days, please contact the clinic through [employer]er[employer] or phone. If you have a critical or abnormal lab result, we will notify you by phone as soon as possible.  Submit refill requests through [employer] or call your pharmacy and they will forward the refill request to us. Please allow 3 business days for your refill to be completed.          Additional Information About Your Visit        DealerSock[employer]ar[employer]opely Information     [employer] gives you secure access to your electronic health record. If you see a primary care provider, you can also send messages to your care team and make appointments. If you have questions, please call your primary care clinic.  If you do not have a primary care provider, please call [phone] and they will assist you.        Care EveryWhere ID     This is your Care EveryWhere ID. This could be used by other organizations to access your [facility] medical records  [id number]        Your Vitals Were     Pulse Temperature Respirations Height Pulse Oximetry BMI (Body Mass Index)    76 97.9  F (36.6  C) (Oral) 16 1.575 m (5' 2.01\") 96% 32.18 kg/m2       Blood Pressure from Last 3 Encounters:   05/29/18 111/58   05/08/18 121/70   04/17/18 115/64    Weight from Last 3 Encounters:   05/29/18 79.8 kg (176 lb)   05/08/18 79.7 kg (175 lb 12.8 oz)   04/17/18 79.1 kg (174 lb 6.4 oz)              Today, you had the following     No orders found for display       Primary Care Provider Fax #    Physician No Ref-Primary [phone]       No address on file        Equal Access to Services     NOE VAZQUEZ : Luz Marina Ramsey, wamikki jefferson, qaybta kaalmaalexa rockwell. So [facility] [phone].    ATENCIÓN: Si habla " español, tiene a lagunas disposición servicios gratuitos de asistencia lingüística. Luis joshi [phone].    We comply with applicable federal civil rights laws and [place] laws. We do not discriminate on the basis of race, color, national origin, age, disability, sex, sexual orientation, or gender identity.            Thank you!     Thank you for choosing [facility]  for your care. Our goal is always to provide you with excellent care. Hearing back from our patients is one way we can continue to improve our services. Please take a few minutes to complete the written survey that you may receive in the mail after your visit with us. Thank you!             Your Updated Medication List - Protect others around you: Learn how to safely use, store and throw away your medicines at www.disposemymeds.org.          This list is accurate as of 5/29/18  9:21 AM.  Always use your most recent med list.                   Brand Name Dispense Instructions for use Diagnosis    albuterol 108 (90 Base) MCG/ACT Inhaler    PROAIR HFA/PROVENTIL HFA/VENTOLIN HFA    1 Inhaler    Inhale 2 puffs into the lungs every 6 hours as needed for shortness of breath / dyspnea or wheezing    Cough       benzonatate 200 MG capsule    TESSALON    21 capsule    Take 1 capsule (200 mg) by mouth 3 times daily as needed for cough    Cough       guaiFENesin-codeine 100-10 MG/5ML Soln solution    ROBITUSSIN AC    240 mL    Take 5-10 mLs by mouth every 6 hours as needed for cough    Cough       order for DME     1 each    L UE class 2 compression sleeve and gauntlet Night garment Bandaging supplies    Lymphedema of left upper extremity       tamoxifen 20 MG tablet    NOLVADEX    90 tablet    Take 1 tablet (20 mg) by mouth daily    Cancer of central portion of left breast (H)       VITAMIN D (CHOLECALCIFEROL) PO      Take 1,000 Units by mouth daily

## 2018-05-29 NOTE — NURSING NOTE
"Oncology Rooming Note    May 29, 2018 8:59 AM   Hoda Brush is a 63 year old female who presents for:    Chief Complaint   Patient presents with     Port Draw     Labs drawn from port by RN. Line flushed with saline and heparin. Vs taken and pt checked in for appt     Oncology Clinic Visit     return breast     Initial Vitals: /58 (BP Location: Right arm, Cuff Size: Adult Regular)  Pulse 76  Temp 97.9  F (36.6  C) (Oral)  Resp 16  Ht 1.575 m (5' 2.01\")  Wt 79.8 kg (176 lb)  SpO2 96%  BMI 32.18 kg/m2 Estimated body mass index is 32.18 kg/(m^2) as calculated from the following:    Height as of this encounter: 1.575 m (5' 2.01\").    Weight as of this encounter: 79.8 kg (176 lb). Body surface area is 1.87 meters squared.  No Pain (0) Comment: Data Unavailable   No LMP recorded. Patient is postmenopausal.  Allergies reviewed: Yes  Medications reviewed: Yes    Medications: Medication refills not needed today.  Pharmacy name entered into Same Day [employer]es:    [id number] [place] [facility] - [place], [place] - [address] [id number] - [place], [place] - [address] [facility] [address] [place], [place] [id number] - [place], [place] - [address] AT  [address] [place] [address] [facility]    Clinical concerns: none       6 minutes for nursing intake (face to face time)     Shonda Pat RN              "

## 2018-05-29 NOTE — PATIENT INSTRUCTIONS
Contact Numbers    [facility] Main Line: [phone]  [facility] Triage and after hours / weekends / holidays:  [phone]      Please call the triage or after hours line if you experience a temperature greater than or equal to 100.5, shaking chills, have uncontrolled nausea, vomiting and/or diarrhea, dizziness, shortness of breath, chest pain, bleeding, unexplained bruising, or if you have any other new/concerning symptoms, questions or concerns.      If you are having any concerning symptoms or wish to speak to a provider before your next infusion visit, please call your care coordinator or triage to notify them so we can adequately serve you.     If you need a refill on a narcotic prescription or other medication, please call before your infusion appointment.                   May 2018   Jakub Monday Tuesday Wednesday Thursday Friday Saturday             1     2     3     4     5       6     7     8     [facility] DRAW    8:00 AM   (60 min.)    MASONIC LAB DRAW   [facility] Lab Draw     [facility] RETURN    8:35 AM   (90 min.)   Joss[facility] Sparrow [facility]   [facility]   10:00 AM   (75 min.)    ONCOLOGY INFUSION   [facility] 9     10     11     12       13     14     15     16     17     18     19       20     21     22     23     24     25     26       27     28     29     [facility] LAB DRAW    8:15 AM   (30 min.)    [facility] LAB DRAW   [facility] Lab Draw     [facility] RETURN    8:35 AM   (90 min.)   Jossie Sparrow [facility]   [facility] 60   10:00 AM   (60 min.)    ONCOLOGY INFUSION   [facility] 30 31 June 2018 Sunday Monday Tuesday Wednesday Thursday Friday Saturday                            1     2       3     4     5     6     7     8     9       10     11     12     13     14     15     16       17     18     19     [facility] DRAW   10:30 AM   (15  min.)   [facility] LAB DRAW   [facility] Lab Draw     ECH LIMITED   11:00 AM   (60 min.)   UCECHCR2   [facility] Echo     [facility] RETURN   12:15 PM   (30 min.)   Lisandro Aguiar MD   [facility]     UM ONC INFUSION 60    1:30 PM   (60 min.)    ONCOLOGY INFUSION   [facility] 20     21     22     23       24     25     26     27     28     29     30                 Recent Results (from the past 24 hour(s))   CBC with platelets differential    Collection Time: 05/29/18  8:33 AM   Result Value Ref Range    WBC 6.5 4.0 - 11.0 10e9/L    RBC Count 3.81 3.8 - 5.2 10e12/L    Hemoglobin 11.6 (L) 11.7 - 15.7 g/dL    Hematocrit 36.9 35.0 - 47.0 %    MCV 97 78 - 100 fl    MCH 30.4 26.5 - 33.0 pg    MCHC 31.4 (L) 31.5 - 36.5 g/dL    RDW 13.4 10.0 - 15.0 %    Platelet Count 192 150 - 450 10e9/L    Diff Method Automated Method     % Neutrophils 50.3 %    % Lymphocytes 38.2 %    % Monocytes 6.9 %    % Eosinophils 4.1 %    % Basophils 0.5 %    % Immature Granulocytes 0.0 %    Nucleated RBCs 0 0 /100    Absolute Neutrophil 3.3 1.6 - 8.3 10e9/L    Absolute Lymphocytes 2.5 0.8 - 5.3 10e9/L    Absolute Monocytes 0.5 0.0 - 1.3 10e9/L    Absolute Eosinophils 0.3 0.0 - 0.7 10e9/L    Absolute Basophils 0.0 0.0 - 0.2 10e9/L    Abs Immature Granulocytes 0.0 0 - 0.4 10e9/L    Absolute Nucleated RBC 0.0    Comprehensive metabolic panel    Collection Time: 05/29/18  8:33 AM   Result Value Ref Range    Sodium 143 133 - 144 mmol/L    Potassium 4.2 3.4 - 5.3 mmol/L    Chloride 111 (H) 94 - 109 mmol/L    Carbon Dioxide 27 20 - 32 mmol/L    Anion Gap 5 3 - 14 mmol/L    Glucose 83 70 - 99 mg/dL    Urea Nitrogen 17 7 - 30 mg/dL    Creatinine 0.74 0.52 - 1.04 mg/dL    GFR Estimate 79 >60 mL/min/1.7m2    GFR Estimate If Black >90 >60 mL/min/1.7m2    Calcium 8.3 (L) 8.5 - 10.1 mg/dL    Bilirubin Total 0.3 0.2 - 1.3 mg/dL    Albumin 3.2 (L) 3.4 - 5.0 g/dL    Protein Total 7.0 6.8 - 8.8 g/dL    Alkaline  Phosphatase 31 (L) 40 - 150 U/L    ALT 19 0 - 50 U/L    AST 20 0 - 45 U/L   CA 27.29 Breast tumor marker    Collection Time: 05/29/18  8:33 AM   Result Value Ref Range    CA 27-29 9 0 - 39 U/mL   CEA    Collection Time: 05/29/18  8:33 AM   Result Value Ref Range    CEA <0.5 0 - 2.5 ug/L

## 2018-05-29 NOTE — LETTER
5/29/2018      RE: Hoda Brush  [address] [facility] [address] [facility] [address]       Oncology/Hematology Visit Note  May 29, 2018    Reason for Visit: follow up of ER positive, HER2 positive left metastatic breast cancer    History of Present Illness: Hoda Brush is a 63 year old female with metastatic ER positive, HER 2 positive left breast cancer with bone mets.     TREATMENT HISTORY:  A. Initial diagnosis with metastatic breast cancer in [facility].  Neoadjuvant CAF x 6.   B. Left mastectomy. Left axillary node dissection.  C.  Radiation in 1 dose to R iliac region. g Gy.  C. Herceptin for 2 years taxane for a prescribed course then stopped, monthly zoledronic acid.  She had 2 years of Herceptin with tamoxifen added after chemotherapy.  D.  Tamoxifen alone and zoledronic acid every 3 months.  E.  Move to .[place].  We restarted Herceptin every 3 weeks and continued tamoxifen. Bone targeted agent changed to denosumab every 6 weeks.     She was admitted 9/20-9/21 with LUE cellulitis with leukocytosis. Given a dose of IV Ancef and discharged on keflex. Last restaging was 3/26/18 with CT CAP showing stable disease..      She presents prior to her next dose of Herceptin.      Interval History:  Hoda states she has been feeling overall well. She has not been walking recently due the hot weather this past weekend. She had her grandchildren over for a big party this holiday weekend. She states she is eating a healthful diet and that her daughter insists she eat more vegetables. She is taking calcium and vitamin D. She is tolerating tamoxifen well without any noticeable side effects. She denies any vaginal bleeding. Denies chest pain, dyspnea, dizziness, headaches, edema in legs.  Her 10-point review of systems is otherwise negative.     Current Outpatient Prescriptions   Medication Sig Dispense Refill     albuterol (PROAIR HFA/PROVENTIL HFA/VENTOLIN HFA) 108 (90 BASE) MCG/ACT Inhaler Inhale 2 puffs into the  lungs every 6 hours as needed for shortness of breath / dyspnea or wheezing (Patient not taking: Reported on 3/6/2018) 1 Inhaler 0     benzonatate (TESSALON) 200 MG capsule Take 1 capsule (200 mg) by mouth 3 times daily as needed for cough (Patient not taking: Reported on 2/13/2018) 21 capsule 0     guaiFENesin-codeine (ROBITUSSIN AC) 100-10 MG/5ML SOLN solution Take 5-10 mLs by mouth every 6 hours as needed for cough (Patient not taking: Reported on 2/13/2018) 240 mL 0     order for DME L UE class 2 compression sleeve and gauntlet  Night garment  Bandaging supplies (Patient not taking: Reported on 2/13/2018) 1 each 1     tamoxifen (NOLVADEX) 20 MG tablet Take 1 tablet (20 mg) by mouth daily 90 tablet 3     VITAMIN D, CHOLECALCIFEROL, PO Take 1,000 Units by mouth daily         Physical Examination:  General: The patient is a pleasant female in no acute distress.  There were no vitals taken for this visit.  Wt Readings from Last 10 Encounters:   05/08/18 79.7 kg (175 lb 12.8 oz)   04/17/18 79.1 kg (174 lb 6.4 oz)   03/27/18 78.9 kg (173 lb 14.4 oz)   03/06/18 78 kg (172 lb)   02/13/18 79.7 kg (175 lb 12.8 oz)   01/25/18 79.8 kg (176 lb)   01/24/18 79.7 kg (175 lb 12.8 oz)   01/05/18 80.6 kg (177 lb 9.6 oz)   12/12/17 79.2 kg (174 lb 11.2 oz)   11/22/17 78.8 kg (173 lb 12.8 oz)     HEENT: EOMI, PERRL. Sclerae are anicteric. Oral mucosa is pink and moist with no lesions or thrush.   Lymph: No lymphadenopathy in the cervical, supraclavicular or axillary areas. Left arm with some non-pitting edema compared to right.  Breast: Patient declined today  Heart: Regular rate and rhythm.   Lungs: Clear to auscultation bilaterally.   Abdomen: Bowel sounds present, soft, nontender, nondistended  Extremities: No lower extremity edema noted bilaterally.   Neuro: Cranial nerves II through XII are grossly intact.  Skin: No rashes, petechiae, or bruising noted on exposed skin.    Laboratory Data:  Results for NATASHA LOUIS I  (MRN [id number]) as of 5/29/2018 09:12   5/29/2018 08:33   Sodium 143   Potassium 4.2   Chloride 111 (H)   Carbon Dioxide 27   Urea Nitrogen 17   Creatinine 0.74   GFR Estimate 79   GFR Estimate If Black >90   Calcium 8.3 (L)   Anion Gap 5   Albumin 3.2 (L)   Protein Total 7.0   Bilirubin Total 0.3   Alkaline Phosphatase 31 (L)   ALT 19   AST 20   Glucose 83   WBC 6.5   Hemoglobin 11.6 (L)   Hematocrit 36.9   Platelet Count 192   RBC Count 3.81   MCV 97   MCH 30.4   MCHC 31.4 (L)   RDW 13.4   Diff Method Automated Method   % Neutrophils 50.3   % Lymphocytes 38.2   % Monocytes 6.9   % Eosinophils 4.1   % Basophils 0.5   % Immature Granulocytes 0.0   Nucleated RBCs 0   Absolute Neutrophil 3.3   Absolute Lymphocytes 2.5   Absolute Monocytes 0.5   Absolute Eosinophils 0.3   Absolute Basophils 0.0   Abs Immature Granulocytes 0.0   Absolute Nucleated RBC 0.0       Assessment and Plan:     1. Metastatic breast cancer, ER, GA, HER2+ positive: Was last treated in [facility] with Herceptin. We have continued Herceptin every 3 weeks as well as daily tamoxifen. She tolerates treatment extremely well without overt symptoms aside from some fatigue for 2 days after Herceptin. CT CAP on 3/26/18 with stable disease. Echocardiogram 3/26/2018 with EF 60-65% and normal LV function.  Continue Herceptin every 3 weeks. Continue Tamoxifen.   --Follow up on 6/19 with Dr. Aguiar. Echo and labs prior    2. Bone metastasis: Continue xgeva every 6 weeks. Takes calcium daily, unsure of dose. She is on vitamin D3 1000 IU daily. Xgeva due today  --Next dose due 7/10     3. LUE lymphedema: She completed lymphedema treatment and has a sleeve to use now.     4. Genetic testing: negative    5. Anemia: Mild, normocytic. Negative colonoscopy (2/1/18). EGD with biopsies (2/16/18) that showed gastritis, no H pylori, intestinal metaplasia or dysplasia. Hgb 11.6 today, stable    Jossie Sparrow PA-C  [facility]  [address] [place]  [address]  [phone]

## 2018-05-29 NOTE — MR AVS SNAPSHOT
After Visit Summary   5/29/2018    Hoda Brush    MRN: [id number]           Patient Information     Date Of Birth          1955        Visit Information        Provider Department      5/29/2018 10:00 AM Sheeba Cifuentes;  29 ATC;  ONCOLOGY INFUSION  Services Department        Today's Diagnoses     Malignant neoplasm of left female breast, unspecified estrogen receptor status, unspecified site of breast (H)    -  1    Bone metastasis (H)          Care Instructions    Contact Numbers    [facility] Main Line: [phone]  [facility] Triage and after hours / weekends / holidays:  [phone]      Please call the triage or after hours line if you experience a temperature greater than or equal to 100.5, shaking chills, have uncontrolled nausea, vomiting and/or diarrhea, dizziness, shortness of breath, chest pain, bleeding, unexplained bruising, or if you have any other new/concerning symptoms, questions or concerns.      If you are having any concerning symptoms or wish to speak to a provider before your next infusion visit, please call your care coordinator or triage to notify them so we can adequately serve you.     If you need a refill on a narcotic prescription or other medication, please call before your infusion appointment.                   May 2018   Jakub Monday Tuesday Wednesday Thursday Friday Saturday             1     2     3     4     5       6     7     8     [facility] LAB DRAW    8:00 AM   (60 min.)   [facility]   [facility] Lab Draw     [facility] RETURN    8:35 AM   (90 min.)   Jossie Sparrow PA   [facility] ONC INFUSION 60   10:00 AM   (75 min.)    ONCOLOGY INFUSION   [facility] 9     10     11     12       13     14     15     16     17     18     19       20     21     22     23     24     25     26       27     28     29     [facility] DRAW    8:15 AM   (30 min.)   [facility]   [facility]  [facility] Lab Draw     [facility] RETURN    8:35 AM   (90 min.)   Jossie Sparrow PA   [facility] ONC INFUSION 60   10:00 AM   (60 min.)    ONCOLOGY INFUSION   [facility] 30 31 June 2018 Sunday Monday Tuesday Wednesday Thursday Friday Saturday                            1     2       3     4     5     6     7     8     9       10     11     12     13     14     15     16       17     18     19     [facility] LAB DRAW   10:30 AM   (15 min.)   [facility] LAB DRAW   [facility] Lab Draw     ECH LIMITED   11:00 AM   (60 min.)   UCECHCR2   [facility] Echo     [facility] RETURN   12:15 PM   (30 min.)   Lisandro Aguiar MD   [facility] ONC INFUSION 60    1:30 PM   (60 min.)    ONCOLOGY INFUSION   [facility] 20     21     22     23       24     25     26     27     28     29     30                 Recent Results (from the past 24 hour(s))   CBC with platelets differential    Collection Time: 05/29/18  8:33 AM   Result Value Ref Range    WBC 6.5 4.0 - 11.0 10e9/L    RBC Count 3.81 3.8 - 5.2 10e12/L    Hemoglobin 11.6 (L) 11.7 - 15.7 g/dL    Hematocrit 36.9 35.0 - 47.0 %    MCV 97 78 - 100 fl    MCH 30.4 26.5 - 33.0 pg    MCHC 31.4 (L) 31.5 - 36.5 g/dL    RDW 13.4 10.0 - 15.0 %    Platelet Count 192 150 - 450 10e9/L    Diff Method Automated Method     % Neutrophils 50.3 %    % Lymphocytes 38.2 %    % Monocytes 6.9 %    % Eosinophils 4.1 %    % Basophils 0.5 %    % Immature Granulocytes 0.0 %    Nucleated RBCs 0 0 /100    Absolute Neutrophil 3.3 1.6 - 8.3 10e9/L    Absolute Lymphocytes 2.5 0.8 - 5.3 10e9/L    Absolute Monocytes 0.5 0.0 - 1.3 10e9/L    Absolute Eosinophils 0.3 0.0 - 0.7 10e9/L    Absolute Basophils 0.0 0.0 - 0.2 10e9/L    Abs Immature Granulocytes 0.0 0 - 0.4 10e9/L    Absolute Nucleated RBC 0.0    Comprehensive metabolic panel    Collection Time: 05/29/18  8:33 AM   Result Value Ref  Range    Sodium 143 133 - 144 mmol/L    Potassium 4.2 3.4 - 5.3 mmol/L    Chloride 111 (H) 94 - 109 mmol/L    Carbon Dioxide 27 20 - 32 mmol/L    Anion Gap 5 3 - 14 mmol/L    Glucose 83 70 - 99 mg/dL    Urea Nitrogen 17 7 - 30 mg/dL    Creatinine 0.74 0.52 - 1.04 mg/dL    GFR Estimate 79 >60 mL/min/1.7m2    GFR Estimate If Black >90 >60 mL/min/1.7m2    Calcium 8.3 (L) 8.5 - 10.1 mg/dL    Bilirubin Total 0.3 0.2 - 1.3 mg/dL    Albumin 3.2 (L) 3.4 - 5.0 g/dL    Protein Total 7.0 6.8 - 8.8 g/dL    Alkaline Phosphatase 31 (L) 40 - 150 U/L    ALT 19 0 - 50 U/L    AST 20 0 - 45 U/L   CA 27.29 Breast tumor marker    Collection Time: 05/29/18  8:33 AM   Result Value Ref Range    CA 27-29 9 0 - 39 U/mL   CEA    Collection Time: 05/29/18  8:33 AM   Result Value Ref Range    CEA <0.5 0 - 2.5 ug/L                 Follow-ups after your visit        Your next 10 appointments already scheduled     Jun 19, 2018 10:30 AM CDT   [facility] Lab Draw with  [facility] LAB DRAW   [facility] Lab Draw ([facility])    [address] [place] [address]  [address]  [facility] [id number] [phone]            Jun 19, 2018 11:00 AM CDT   Ech Limited with UCECHCR2   [facility] Echo ([facility])    [address] [place] [address]  3rd Floor  [facility] [id number] [phone]           1.  Please bring or wear a comfortable two-piece outfit. 2.  You may eat, drink and take your normal medicines. 3.  For any questions that cannot be answered, please contact the ordering physician            Jun 19, 2018 12:30 PM CDT   (Arrive by 12:15 PM)   Return Visit with Lisandro Aguiar MD   [facility] ([facility])    [address] [place] [address]  [address]  [facility] [id number] [phone]            Jun 19, 2018  1:30 PM CDT   Infusion 60 with  ONCOLOGY INFUSION, UC 28 ATC   [facility] Cancer [facility] ([facility])    [address]  [address]  [facility] [address]   [phone]            Jul 10, 2018  7:30 AM CDT   [facility] Lab Draw with  [facility] LAB DRAW   [facility] Lab Draw ([facility])    [address]  [address]  [facility] [address]   [phone]            Jul 10, 2018  8:00 AM CDT   (Arrive by 7:45 AM)   Return Visit with SEMAJ Eddy   [facility] Cancer [facility] ([facility])    [address] [place] [address]  [address]  [facility] [address]   [phone]            Jul 10, 2018  8:30 AM CDT   Infusion 60 with UC ONCOLOGY INFUSION, UC 27 ATC   [facility] ([facility])    [address] [place] [address]  [address]  [facility] [address] [phone]              Who to contact     If you have questions or need follow up information about today's clinic visit or your schedule please contact [facility] directly at [phone].  Normal or non-critical lab and imaging results will be communicated to you by [employer], letter or phone within 4 business days after the clinic has received the results. If you do not hear from us within 7 days, please contact the clinic through [employer] or phone. If you have a critical or abnormal lab result, we will notify you by phone as soon as possible.  Submit refill requests through [employer] or call your pharmacy and they will forward the refill request to us. Please allow 3 business days for your refill to be completed.          Additional Information About Your Visit        [employer] Information     [employer] gives you secure access to your electronic health record. If you see a primary care provider, you can also send messages to your care team and make appointments. If you have questions, please call your primary care clinic.  If you do not have a primary care provider, please call [phone] and they will assist you.        Care EveryWhere ID     This is your  Care EveryWhere ID. This could be used by other organizations to access your [place] medical records  [id number]         Blood Pressure from Last 3 Encounters:   05/29/18 111/58   05/08/18 121/70   04/17/18 115/64    Weight from Last 3 Encounters:   05/29/18 79.8 kg (176 lb)   05/08/18 79.7 kg (175 lb 12.8 oz)   04/17/18 79.1 kg (174 lb 6.4 oz)              We Performed the Following     CA 27.29 Breast tumor marker     CBC with platelets differential     CEA     Comprehensive metabolic panel        Primary Care Provider Fax #    Physician No Ref-Primary [phone]       No address on file        Equal Access to Services     NOE VAZQUEZ : Luz Marina Ramsey, ro jefferson, jamal wilkins, alexa angulo . So [facility] [phone].    ATENCIÓN: Si habla español, tiene a lagunas disposición servicios gratuitos de asistencia lingüística. Llame al [phone].    We comply with applicable federal civil rights laws and [place] laws. We do not discriminate on the basis of race, color, national origin, age, disability, sex, sexual orientation, or gender identity.            Thank you!     Thank you for choosing [facility]  for your care. Our goal is always to provide you with excellent care. Hearing back from our patients is one way we can continue to improve our services. Please take a few minutes to complete the written survey that you may receive in the mail after your visit with us. Thank you!             Your Updated Medication List - Protect others around you: Learn how to safely use, store and throw away your medicines at www.disposemymeds.org.          This list is accurate as of 5/29/18 10:38 AM.  Always use your most recent med list.                   Brand Name Dispense Instructions for use Diagnosis    albuterol 108 (90 Base) MCG/ACT Inhaler    PROAIR HFA/PROVENTIL HFA/VENTOLIN HFA    1 Inhaler    Inhale 2 puffs into the lungs every 6 hours  as needed for shortness of breath / dyspnea or wheezing    Cough       benzonatate 200 MG capsule    TESSALON    21 capsule    Take 1 capsule (200 mg) by mouth 3 times daily as needed for cough    Cough       guaiFENesin-codeine 100-10 MG/5ML Soln solution    ROBITUSSIN AC    240 mL    Take 5-10 mLs by mouth every 6 hours as needed for cough    Cough       order for DME     1 each    L UE class 2 compression sleeve and gauntlet Night garment Bandaging supplies    Lymphedema of left upper extremity       tamoxifen 20 MG tablet    NOLVADEX    90 tablet    Take 1 tablet (20 mg) by mouth daily    Cancer of central portion of left breast (H)       VITAMIN D (CHOLECALCIFEROL) PO      Take 1,000 Units by mouth daily

## 2018-05-29 NOTE — NURSING NOTE
"Chief Complaint   Patient presents with     Port Draw     Labs drawn from port by RN. Line flushed with saline and heparin. Vs taken and pt checked in for appt     Port accessed with 20g 3/4\" gripper needle by RN, labs collected, line flushed with saline and heparin.  Vitals taken. Pt checked in for appointment(s).    Eimly Muonz RN  "

## 2018-06-18 RX ORDER — MEPERIDINE HYDROCHLORIDE 25 MG/ML
25 INJECTION INTRAMUSCULAR; INTRAVENOUS; SUBCUTANEOUS EVERY 30 MIN PRN
Status: CANCELLED | OUTPATIENT
Start: 2018-06-19

## 2018-06-18 RX ORDER — EPINEPHRINE 1 MG/ML
0.3 INJECTION, SOLUTION INTRAMUSCULAR; SUBCUTANEOUS EVERY 5 MIN PRN
Status: CANCELLED | OUTPATIENT
Start: 2018-06-19

## 2018-06-18 RX ORDER — DIPHENHYDRAMINE HYDROCHLORIDE 50 MG/ML
50 INJECTION INTRAMUSCULAR; INTRAVENOUS
Status: CANCELLED
Start: 2018-06-19

## 2018-06-18 RX ORDER — ALBUTEROL SULFATE 0.83 MG/ML
2.5 SOLUTION RESPIRATORY (INHALATION)
Status: CANCELLED | OUTPATIENT
Start: 2018-06-19

## 2018-06-18 RX ORDER — ALBUTEROL SULFATE 90 UG/1
1-2 AEROSOL, METERED RESPIRATORY (INHALATION)
Status: CANCELLED
Start: 2018-06-19

## 2018-06-18 RX ORDER — ACETAMINOPHEN 325 MG/1
650 TABLET ORAL
Status: CANCELLED | OUTPATIENT
Start: 2018-06-19

## 2018-06-18 RX ORDER — LORAZEPAM 2 MG/ML
0.5 INJECTION INTRAMUSCULAR EVERY 4 HOURS PRN
Status: CANCELLED
Start: 2018-06-19

## 2018-06-18 RX ORDER — DIPHENHYDRAMINE HCL 25 MG
50 CAPSULE ORAL ONCE
Status: CANCELLED
Start: 2018-06-19

## 2018-06-18 RX ORDER — SODIUM CHLORIDE 9 MG/ML
1000 INJECTION, SOLUTION INTRAVENOUS CONTINUOUS PRN
Status: CANCELLED
Start: 2018-06-19

## 2018-06-18 RX ORDER — HEPARIN SODIUM (PORCINE) LOCK FLUSH IV SOLN 100 UNIT/ML 100 UNIT/ML
5 SOLUTION INTRAVENOUS EVERY 8 HOURS
Status: CANCELLED | OUTPATIENT
Start: 2018-06-19

## 2018-06-18 RX ORDER — EPINEPHRINE 0.3 MG/.3ML
0.3 INJECTION SUBCUTANEOUS EVERY 5 MIN PRN
Status: CANCELLED | OUTPATIENT
Start: 2018-06-19

## 2018-06-18 RX ORDER — METHYLPREDNISOLONE SODIUM SUCCINATE 125 MG/2ML
125 INJECTION, POWDER, LYOPHILIZED, FOR SOLUTION INTRAMUSCULAR; INTRAVENOUS
Status: CANCELLED
Start: 2018-06-19

## 2018-06-18 NOTE — PROGRESS NOTES
Hoda is seen with her daughter, Ashleigh, today for a new patient visit.  The patient is a refugee from [facility] and is here for continuation of care for her metastatic ER+HER2- breast cancer.  She is a [employer] refugee from [facility] and was seen in clinic with her daughter.  The only data we have from [facility] is an English translation of her records.       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  On 02/11/2014, she was seen by an oncologist.  The clinical staging of T4b N2 M1 was noted.   Her breast cancer was on the left side. There was no right breast cancer, confirmed by the patient and her daughter, correcting a possible error in the translated records.  ER was positive in 100% of the cells, NV at 14% and HER2 was 3+ positive.  It appears that this histopathologic information is on the mastectomy specimen. She underwent an MRI for staging of presumptive bone metastases which was performed 03/02/2014.  There were skeletal metastases in the thoracic vertebrae at 12, L1, L3, L5 and S1 vertebral body, ranging in size from 0.7-3.0 cm in size.  Ischial and right femur were also involved, as well as a large iliac mass measuring about 15 cm in the uterus. 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   Radiation Oncology consultation was performed 03/11/2014, and she was given radiation in 1 dose of 6 Gy to the right iliac lesion.        With the initial diagnosis, she initiated treatment with 6 cycles of CAF neoadjuvant chemotherapy 02/14, 07/07, 03/28, 04/18/14, 05/08 and 05/29/14. She also received monthly zoledronic acid.  She then underwent a modified left mastectomy of Palacios type and left lymphadenoectomy on 06/27/2014.   Pathologic examination showed number at [facility] was [id number] showed infiltrative grade 2 ductal cancer with 6 level 1 metastatic lymph nodes and 3 level 2 metastatic lymph nodes.  The differentiation was  intermediate.  The tumor was ER positive 70% of the cells, ID positive in 40% of the cells and HER2 was 3+ by immunohistochemistry and the Ki-67 labeling index was 20%. Her staging after surgery was stage IV, pT4b N2 M1.  I don't see a biopsy of the skeletal metastases.  She then had continuation with chemotherapy with 4 cycles of Herceptin and taxane with monthly zoledronic acid.  Tamoxifen was initiated.  She then had two years of Herceptin and a decision was made not to continue further Herceptin.  She continued on zoledronic acid every 3 months. She was clinically stable. [place] then [place] the [place] new [place] from [facility].  She arrived last month, established [place] residency and now seeks further oncology care.       TREATMENT HISTORY:  A. Initial diagnosis with metastatic breast cancer in [facility].  Neoadjuvant CAF x 6.   B. Left mastectomy. Left axillary node dissection.  C.  Radiation in 1 dose to R iliac region. g Gy.  C. Herceptin for 2 years taxane for a prescribed course then stopped, monthly zoledronic acid.  She had 2 years of Herceptin with tamoxifen added after chemotherapy.  D.  Tamoxifen alone and zoledronic acid every 3 months.  E.  Move to [facility].  We restarted Herceptin every 3 weeks and continued tamoxifen. Bone targeted agent changed to denosumab every 9 weeks.    INTERVAL HISTORY:  Hoda returns to clinic and has been doing quite well.  She has no pain, no fatigue, no depression, no anxiety.  She does have a little bit of discomfort in the left shoulder blade area that is a new problem for her. There is no pain but there is some minor burning.  We will be obtaining a CT scan of the chest, abdomen and pelvis in 3 weeks and she would like to wait until then.  The discomfort is not waking her at night.      REVIEW OF SYSTEMS:  She has no fevers or chills, cough, chest pain, shortness of breath, nausea, vomiting, constipation, diarrhea, bone pain, back pain or headache.  The remainder  of a 10-point review of systems is negative.  She does report that she occasionally has some right hip discomfort which dates from the time when she was first diagnosed with metastatic breast cancer in 2014.  She is taking vitamin D and calcium.  She has been eating a healthful diet and she has been exercising.      PHYSICAL EXAMINATION:   VITAL SIGNS:  Blood pressure 114/67, temperature 97.7, pulse 78, respirations 18, O2 sat 97% on room air, height 1.6 meters and weight 80.9 kg.   GENERAL:  Hoda appeared generally well.  She has no alopecia.   HEENT:  Oropharynx is without lesions.   LYMPH:  There is no palpable cervical, supraclavicular, subclavicular or axillary lymphadenopathy.   BREASTS:  Examination of right breast reveals no masses.  Examination of the left anterior chest wall reveals a mastectomy incision that is well healed without erythema or masses.  No masses in the anterior chest wall bilaterally.   LUNGS:  Clear to percussion and auscultation.   HEART:  Regular rate and rhythm, S1, S2.   ABDOMEN:  Soft and nontender, consistent with increased BMI.   EXTREMITIES:  Without edema.   PSYCHIATRIC:  Mood and affect were normal.      LABORATORY DATA:  The CMP is remarkable for an albumin of 3.2.  CBC within normal limits except for hemoglobin of 11.6.  The echocardiogram showed an ejection fraction of 60%-65%.      ASSESSMENT AND PLAN:     1.  Hoda Brush is a 63-year-old woman with a history of ER-positive, TN-positive, HER2-positive breast cancer.  She is from [facility], formerly from [facility] and came [place]  She lives with her daughter and is here for continuation of every-3-week Herceptin and tamoxifen.  Her tumor is ER-positive, TN-positive and HER2 positive.  She had metastatic disease at the time of presentation with bone only metastases by report.  She presented and was diagnosed with metastatic breast cancer in 02/2014 and her staging initially was a stage IV, T4N2M1 invasive  ductal carcinoma of the left breast.  She had right hip metastases.  She underwent neoadjuvant CAF, left mastectomy, left axillary lymph node dissection and radiation.  She had radiation of the right iliac region where she has metastatic disease.  Pathology report from [facility] shows the tumor to be ER positive in 75% of cells, PA positive in 40% of the cells, and HER2 was 3+ positive by immunohistochemistry.  Ki-67 was 20%.  She had no evidence of distant metastatic disease in her PET/CT except for multiple osseous foci of increased radiotracer uptake consistent with her metastatic disease.  This PET/CT was from 08/2017 and was apparently unchanged compared with 06/12/2014 exam.   2. Complaint of left shoulder burning. She complains of minor discomfort that doesn't bother her most of the time.  My level of suspicion of the left shoulder burning is a relatively low with regard to metastatic disease, but the CT scan could be helpful.  At this time, this does not represent a major problem for her and she is having no difficulty sleeping at night and no pain.  3.  Restaging.  A CT of the chest, abdomen and pelvis will be performed before her next followup visit.  Lymphedema referral has been placed.  Genetic testing was negative.  The plan is to continue Xgeva every 9 weeks.     4.  Followup.  We will see Hoda in followup in our clinic in 3 weeks and she will receive Xgeva at that time.  We will perform a CT scan of the chest, abdomen and pelvis before this visit for restaging.   CT CAP 7-9-18 and CBC, CMP, CA27.29 and CEA and follow up appointment with me on 7-10 with Herceptin.        Thank you for allowing us to continue to participate in Hoda's care.      Lisandro Aguiar MD      [facility]       I spent 30 minutes with the patient more than 50% of which was in counseling and coordination of care.

## 2018-06-19 ENCOUNTER — INFUSION THERAPY VISIT (OUTPATIENT)
Dept: ONCOLOGY | Facility: CLINIC | Age: 63
End: 2018-06-19
Attending: INTERNAL MEDICINE
Payer: COMMERCIAL

## 2018-06-19 ENCOUNTER — RADIANT APPOINTMENT (OUTPATIENT)
Dept: CARDIOLOGY | Facility: CLINIC | Age: 63
End: 2018-06-19
Attending: PHYSICIAN ASSISTANT
Payer: COMMERCIAL

## 2018-06-19 VITALS
RESPIRATION RATE: 18 BRPM | OXYGEN SATURATION: 97 % | DIASTOLIC BLOOD PRESSURE: 67 MMHG | SYSTOLIC BLOOD PRESSURE: 114 MMHG | BODY MASS INDEX: 32.83 KG/M2 | HEART RATE: 78 BPM | TEMPERATURE: 97.7 F | WEIGHT: 178.4 LBS | HEIGHT: 62 IN

## 2018-06-19 DIAGNOSIS — C50.912 MALIGNANT NEOPLASM OF LEFT FEMALE BREAST, UNSPECIFIED ESTROGEN RECEPTOR STATUS, UNSPECIFIED SITE OF BREAST (H): ICD-10-CM

## 2018-06-19 DIAGNOSIS — C50.912 MALIGNANT NEOPLASM OF LEFT FEMALE BREAST, UNSPECIFIED ESTROGEN RECEPTOR STATUS, UNSPECIFIED SITE OF BREAST (H): Primary | ICD-10-CM

## 2018-06-19 LAB
ALBUMIN SERPL-MCNC: 3.2 G/DL (ref 3.4–5)
ALP SERPL-CCNC: 36 U/L (ref 40–150)
ALT SERPL W P-5'-P-CCNC: 22 U/L (ref 0–50)
ANION GAP SERPL CALCULATED.3IONS-SCNC: 7 MMOL/L (ref 3–14)
AST SERPL W P-5'-P-CCNC: 18 U/L (ref 0–45)
BASOPHILS # BLD AUTO: 0 10E9/L (ref 0–0.2)
BASOPHILS NFR BLD AUTO: 0.6 %
BILIRUB SERPL-MCNC: 0.2 MG/DL (ref 0.2–1.3)
BUN SERPL-MCNC: 14 MG/DL (ref 7–30)
CALCIUM SERPL-MCNC: 8.3 MG/DL (ref 8.5–10.1)
CANCER AG27-29 SERPL-ACNC: 5 U/ML (ref 0–39)
CEA SERPL-MCNC: <0.5 UG/L (ref 0–2.5)
CHLORIDE SERPL-SCNC: 110 MMOL/L (ref 94–109)
CO2 SERPL-SCNC: 27 MMOL/L (ref 20–32)
CREAT SERPL-MCNC: 0.74 MG/DL (ref 0.52–1.04)
DIFFERENTIAL METHOD BLD: ABNORMAL
EOSINOPHIL # BLD AUTO: 0.3 10E9/L (ref 0–0.7)
EOSINOPHIL NFR BLD AUTO: 4.4 %
ERYTHROCYTE [DISTWIDTH] IN BLOOD BY AUTOMATED COUNT: 13.4 % (ref 10–15)
GFR SERPL CREATININE-BSD FRML MDRD: 80 ML/MIN/1.7M2
GLUCOSE SERPL-MCNC: 74 MG/DL (ref 70–99)
HCT VFR BLD AUTO: 37.1 % (ref 35–47)
HGB BLD-MCNC: 11.6 G/DL (ref 11.7–15.7)
IMM GRANULOCYTES # BLD: 0 10E9/L (ref 0–0.4)
IMM GRANULOCYTES NFR BLD: 0.2 %
LYMPHOCYTES # BLD AUTO: 2.7 10E9/L (ref 0.8–5.3)
LYMPHOCYTES NFR BLD AUTO: 41.5 %
MCH RBC QN AUTO: 30.2 PG (ref 26.5–33)
MCHC RBC AUTO-ENTMCNC: 31.3 G/DL (ref 31.5–36.5)
MCV RBC AUTO: 97 FL (ref 78–100)
MONOCYTES # BLD AUTO: 0.5 10E9/L (ref 0–1.3)
MONOCYTES NFR BLD AUTO: 7.3 %
NEUTROPHILS # BLD AUTO: 3 10E9/L (ref 1.6–8.3)
NEUTROPHILS NFR BLD AUTO: 46 %
NRBC # BLD AUTO: 0 10*3/UL
NRBC BLD AUTO-RTO: 0 /100
PLATELET # BLD AUTO: 182 10E9/L (ref 150–450)
POTASSIUM SERPL-SCNC: 4 MMOL/L (ref 3.4–5.3)
PROT SERPL-MCNC: 7.1 G/DL (ref 6.8–8.8)
RBC # BLD AUTO: 3.84 10E12/L (ref 3.8–5.2)
SODIUM SERPL-SCNC: 143 MMOL/L (ref 133–144)
WBC # BLD AUTO: 6.6 10E9/L (ref 4–11)

## 2018-06-19 PROCEDURE — 25000128 H RX IP 250 OP 636: Mod: ZF | Performed by: INTERNAL MEDICINE

## 2018-06-19 PROCEDURE — T1013 SIGN LANG/ORAL INTERPRETER: HCPCS | Mod: U3,ZF

## 2018-06-19 PROCEDURE — 80053 COMPREHEN METABOLIC PANEL: CPT | Performed by: INTERNAL MEDICINE

## 2018-06-19 PROCEDURE — 82378 CARCINOEMBRYONIC ANTIGEN: CPT | Performed by: INTERNAL MEDICINE

## 2018-06-19 PROCEDURE — 86300 IMMUNOASSAY TUMOR CA 15-3: CPT | Performed by: INTERNAL MEDICINE

## 2018-06-19 PROCEDURE — G0463 HOSPITAL OUTPT CLINIC VISIT: HCPCS | Mod: ZF

## 2018-06-19 PROCEDURE — 85025 COMPLETE CBC W/AUTO DIFF WBC: CPT | Performed by: INTERNAL MEDICINE

## 2018-06-19 PROCEDURE — 96413 CHEMO IV INFUSION 1 HR: CPT

## 2018-06-19 PROCEDURE — 99214 OFFICE O/P EST MOD 30 MIN: CPT | Mod: ZP | Performed by: INTERNAL MEDICINE

## 2018-06-19 RX ORDER — HEPARIN SODIUM (PORCINE) LOCK FLUSH IV SOLN 100 UNIT/ML 100 UNIT/ML
5 SOLUTION INTRAVENOUS EVERY 8 HOURS
Status: DISCONTINUED | OUTPATIENT
Start: 2018-06-19 | End: 2018-06-19 | Stop reason: HOSPADM

## 2018-06-19 RX ADMIN — SODIUM CHLORIDE, PRESERVATIVE FREE 5 ML: 5 INJECTION INTRAVENOUS at 14:28

## 2018-06-19 RX ADMIN — TRASTUZUMAB 450 MG: 150 INJECTION, POWDER, LYOPHILIZED, FOR SOLUTION INTRAVENOUS at 13:57

## 2018-06-19 ASSESSMENT — PAIN SCALES - GENERAL: PAINLEVEL: NO PAIN (0)

## 2018-06-19 NOTE — MR AVS SNAPSHOT
After Visit Summary   6/19/2018    Hoda Brush    MRN: [id number]           Patient Information     Date Of Birth          1955        Visit Information        Provider Department      6/19/2018 12:00 PM Wesley Iraheta; Lisandro Aguiar MD [facility]        Today's Diagnoses     Malignant neoplasm of left female breast, unspecified estrogen receptor status, unspecified site of breast (H)           Follow-ups after your visit        Your next 10 appointments already scheduled     Jul 09, 2018  2:20 PM CDT   CT CHEST/ABDOMEN/PELVIS W CONTRAST with UCCT2   [facility] [place] [facility] ([facility])    [address]  1st Floor  [facility] [address] [phone]           Please bring any scans or X-rays taken at other hospitals, if similar tests were done. Also bring a list of your medicines, including vitamins, minerals and over-the-counter drugs. It is safest to leave personal items at home.  Be sure to tell your doctor:   If you have any allergies.   If there s any chance you are pregnant.   If you are breastfeeding.  How to prepare:   Do not eat or drink for 2 hours before your exam. If you need to take medicine, you may take it with small sips of water. (We may ask you to take liquid medicine as well.)   Please wear loose clothing, such as a sweat suit or jogging clothes. Avoid snaps, zippers and other metal. We may ask you to undress and put on a hospital gown.  Please arrive 30 minutes early for your CT. Once in the department you might be asked to drink water 15-20 minutes prior to your exam.  If indicated you may be asked to drink an oral contrast in advance of your CT.  If this is the case, the imaging team will let you know or be in contact with you prior to your appointment  Patients over 70 or patients with diabetes or kidney problems:   If you haven t had a blood test (creatinine test) within the last 30 days, the  Cardiologist/Radiologist may require you to get this test prior to your exam.  If you have diabetes:   Continue to take your metformin medication on the day of your exam  If you have any questions, please call the Imaging Department where you will have your exam.            Jul 10, 2018  7:00 AM CDT   [facility] Lab Draw with  MASONIC LAB DRAW   [facility] Lab Draw ([facility])    [address]  [address]  [facility] [address] [phone]            Jul 10, 2018  7:30 AM CDT   (Arrive by 7:15 AM)   Return Visit with Lisandro Aguiar MD   [facility] ([facility])    [address]  [address]  [facility] [id number] [phone]            Jul 10, 2018  8:30 AM CDT   Infusion 60 with  ONCOLOGY INFUSION, UC 27 ATC   [facility] Cancer [facility] ([facility])    [address]  [address]  [facility] [id number] [phone]              Future tests that were ordered for you today     Open Future Orders        Priority Expected Expires Ordered    CT Chest/Abdomen/Pelvis w Contrast Routine  1/15/2019 6/19/2018            Who to contact     If you have questions or need follow up information about today's clinic visit or your schedule please contact [facility] directly at [phone].  Normal or non-critical lab and imaging results will be communicated to you by MyChart, letter or phone within 4 business days after the clinic has received the results. If you do not hear from us within 7 days, please contact the clinic through MyChart or phone. If you have a critical or abnormal lab result, we will notify you by phone as soon as possible.  Submit refill requests through [employer] or call your pharmacy and they will forward the refill request to us. Please allow 3 business days for your refill to be completed.          Additional Information About Your Visit    "     MyChart Information     [employer] gives you secure access to your electronic health record. If you see a primary care provider, you can also send messages to your care team and make appointments. If you have questions, please call your primary care clinic.  If you do not have a primary care provider, please call [phone] and they will assist you.        Care EveryWhere ID     This is your Care EveryWhere ID. This could be used by other organizations to access your [place] medical records  [id number]        Your Vitals Were     Pulse Temperature Respirations Height Pulse Oximetry BMI (Body Mass Index)    78 97.7  F (36.5  C) (Oral) 18 1.575 m (5' 2.01\") 97% 32.62 kg/m2       Blood Pressure from Last 3 Encounters:   06/19/18 114/67   05/29/18 111/58   05/08/18 121/70    Weight from Last 3 Encounters:   06/19/18 80.9 kg (178 lb 6.4 oz)   05/29/18 79.8 kg (176 lb)   05/08/18 79.7 kg (175 lb 12.8 oz)               Primary Care Provider Fax #    Physician No Ref-Primary [phone]       No address on file        Equal Access to Services     NOE VAZQUEZ AH: Hadii kamran Ramsey, waaxda luhungadaha, qaybta kaalmada adesarahyada, alexa beltran. So [facility] [phone].    ATENCIÓN: Si habla español, tiene a lagunas disposición servicios gratuitos de asistencia lingüística. [facility] [phone].    We comply with applicable federal civil rights laws and [place] laws. We do not discriminate on the basis of race, color, national origin, age, disability, sex, sexual orientation, or gender identity.            Thank you!     Thank you for choosing [facility]  for your care. Our goal is always to provide you with excellent care. Hearing back from our patients is one way we can continue to improve our services. Please take a few minutes to complete the written survey that you may receive in the mail after your visit with us. Thank you!             Your Updated Medication List " - Protect others around you: Learn how to safely use, store and throw away your medicines at www.disposemymeds.org.          This list is accurate as of 6/19/18 11:59 PM.  Always use your most recent med list.                   Brand Name Dispense Instructions for use Diagnosis    albuterol 108 (90 Base) MCG/ACT Inhaler    PROAIR HFA/PROVENTIL HFA/VENTOLIN HFA    1 Inhaler    Inhale 2 puffs into the lungs every 6 hours as needed for shortness of breath / dyspnea or wheezing    Cough       benzonatate 200 MG capsule    TESSALON    21 capsule    Take 1 capsule (200 mg) by mouth 3 times daily as needed for cough    Cough       guaiFENesin-codeine 100-10 MG/5ML Soln solution    ROBITUSSIN AC    240 mL    Take 5-10 mLs by mouth every 6 hours as needed for cough    Cough       order for DME     1 each    L UE class 2 compression sleeve and gauntlet Night garment Bandaging supplies    Lymphedema of left upper extremity       tamoxifen 20 MG tablet    NOLVADEX    90 tablet    Take 1 tablet (20 mg) by mouth daily    Cancer of central portion of left breast (H)       VITAMIN D (CHOLECALCIFEROL) PO      Take 1,000 Units by mouth daily

## 2018-06-19 NOTE — PATIENT INSTRUCTIONS
CT still needs to be scheduled    Contact Numbers  [facility]: [phone]    After Hours:  [phone]  Triage: [phone]    Please call the [facility] Triage line if you experience a temperature greater than or equal to 100.5, shaking chills, have uncontrolled nausea, vomiting and/or diarrhea, dizziness, shortness of breath, chest pain, bleeding, unexplained bruising, or if you have any other new/concerning symptoms, questions or concerns.     If it is after hours, weekends, or holidays, please call the main hospital  at  [phone] and ask to speak to the Oncology doctor on call.     If you are having any concerning symptoms or wish to speak to a provider before your next infusion visit, please call your care coordinator or triage to notify them so we can adequately serve you.     If you need a refill on a narcotic prescription or other medication, please call triage before your infusion appointment.         June 2018 Sunday Monday Tuesday Wednesday Thursday Friday Saturday                            1     2       3     4     5     6     7     8     9       10     11     12     13     14     15     16       17     18     19     [facility] LAB DRAW   10:30 AM   (15 min.)    MASONIC LAB DRAW   [facility] Lab Draw     ECH LIMITED   11:00 AM   (60 min.)   UCECHCR2   [facility] Echo     [facility] RETURN   12:15 PM   (90 min.)   Lisandro Aguiar MD   [facility] 60    1:30 PM   (60 min.)    ONCOLOGY INFUSION   [facility] 20     21     22     23       24     25     26     27     28     29     30                July 2018 Sunday Monday Tuesday Wednesday Thursday Friday Saturday   1     2     3     4     5     6     7       8     9     10     P MASONIC LAB DRAW    7:00 AM   (15 min.)    [facility] LAB DRAW   [facility] Lab Draw     [facility] RETURN    7:15 AM   (30 min.)   Lisandro Aguiar MD   [facility]  [facility] ONC INFUSION 60    8:30 AM   (60 min.)    ONCOLOGY INFUSION   [facility] 11     12     13     14       15     16     17     18     19     20     21       22     23     24     25     26     27     28       29     30     31                                     Recent Results (from the past 24 hour(s))   CBC with platelets differential    Collection Time: 06/19/18 12:35 PM   Result Value Ref Range    WBC 6.6 4.0 - 11.0 10e9/L    RBC Count 3.84 3.8 - 5.2 10e12/L    Hemoglobin 11.6 (L) 11.7 - 15.7 g/dL    Hematocrit 37.1 35.0 - 47.0 %    MCV 97 78 - 100 fl    MCH 30.2 26.5 - 33.0 pg    MCHC 31.3 (L) 31.5 - 36.5 g/dL    RDW 13.4 10.0 - 15.0 %    Platelet Count 182 150 - 450 10e9/L    Diff Method Automated Method     % Neutrophils 46.0 %    % Lymphocytes 41.5 %    % Monocytes 7.3 %    % Eosinophils 4.4 %    % Basophils 0.6 %    % Immature Granulocytes 0.2 %    Nucleated RBCs 0 0 /100    Absolute Neutrophil 3.0 1.6 - 8.3 10e9/L    Absolute Lymphocytes 2.7 0.8 - 5.3 10e9/L    Absolute Monocytes 0.5 0.0 - 1.3 10e9/L    Absolute Eosinophils 0.3 0.0 - 0.7 10e9/L    Absolute Basophils 0.0 0.0 - 0.2 10e9/L    Abs Immature Granulocytes 0.0 0 - 0.4 10e9/L    Absolute Nucleated RBC 0.0    Comprehensive metabolic panel    Collection Time: 06/19/18 12:35 PM   Result Value Ref Range    Sodium 143 133 - 144 mmol/L    Potassium 4.0 3.4 - 5.3 mmol/L    Chloride 110 (H) 94 - 109 mmol/L    Carbon Dioxide 27 20 - 32 mmol/L    Anion Gap 7 3 - 14 mmol/L    Glucose 74 70 - 99 mg/dL    Urea Nitrogen 14 7 - 30 mg/dL    Creatinine 0.74 0.52 - 1.04 mg/dL    GFR Estimate 80 >60 mL/min/1.7m2    GFR Estimate If Black >90 >60 mL/min/1.7m2    Calcium 8.3 (L) 8.5 - 10.1 mg/dL    Bilirubin Total 0.2 0.2 - 1.3 mg/dL    Albumin 3.2 (L) 3.4 - 5.0 g/dL    Protein Total 7.1 6.8 - 8.8 g/dL    Alkaline Phosphatase 36 (L) 40 - 150 U/L    ALT 22 0 - 50 U/L    AST 18 0 - 45 U/L

## 2018-06-19 NOTE — MR AVS SNAPSHOT
After Visit Summary   6/19/2018    Hoda Brush    MRN: [id number]           Patient Information     Date Of Birth          1955        Visit Information        Provider Department      6/19/2018 1:30 PM Wesley Iraheta;  28 ATC;  ONCOLOGY INFUSION  Services Department        Today's Diagnoses     Malignant neoplasm of left female breast, unspecified estrogen receptor status, unspecified site of breast (H)    -  1      Care Instructions    CT still needs to be scheduled    Contact Numbers  [facility]: [phone]    After Hours:  [phone]  Triage: [phone]    Please call the [facility] Triage line if you experience a temperature greater than or equal to 100.5, shaking chills, have uncontrolled nausea, vomiting and/or diarrhea, dizziness, shortness of breath, chest pain, bleeding, unexplained bruising, or if you have any other new/concerning symptoms, questions or concerns.     If it is after hours, weekends, or holidays, please call the main hospital  at  [phone] and ask to speak to the Oncology doctor on call.     If you are having any concerning symptoms or wish to speak to a provider before your next infusion visit, please call your care coordinator or triage to notify them so we can adequately serve you.     If you need a refill on a narcotic prescription or other medication, please call triage before your infusion appointment.         June 2018 Sunday Monday Tuesday Wednesday Thursday Friday Saturday                            1     2       3     4     5     6     7     8     9       10     11     12     13     14     15     16       17     18     19     [facility]   [facility]   (15 min.)   [facility] LAB DRAW   [facility] Lab Draw     ECH LIMITED   11:00 AM   (60 min.)   ECHCR2   [facility] Echo     [facility] RETURN   12:15 PM   (90 min.)   Lisandro Aguiar MD   [facility] ONC INFUSION 60     1:30 PM   (60 min.)    ONCOLOGY INFUSION   [facility] 20     21     22     23       24     25     26     27     28     29     30 July 2018 Sunday Monday Tuesday Wednesday Thursday Friday Saturday   1     2     3     4     5     6     7       8     9     10     [facility] MASONIC LAB DRAW    7:00 AM   (15 min.)    MASONIC LAB DRAW   [facility] Lab Draw     [facility] RETURN    7:15 AM   (30 min.)   Lisandro Aguiar MD   [facility] ONC INFUSION 60    8:30 AM   (60 min.)    ONCOLOGY INFUSION   [facility] 11     12     13     14       15     16     17     18     19     20     21       22     23     24     25     26     27     28       29     30     31                                     Recent Results (from the past 24 hour(s))   CBC with platelets differential    Collection Time: 06/19/18 12:35 PM   Result Value Ref Range    WBC 6.6 4.0 - 11.0 10e9/L    RBC Count 3.84 3.8 - 5.2 10e12/L    Hemoglobin 11.6 (L) 11.7 - 15.7 g/dL    Hematocrit 37.1 35.0 - 47.0 %    MCV 97 78 - 100 fl    MCH 30.2 26.5 - 33.0 pg    MCHC 31.3 (L) 31.5 - 36.5 g/dL    RDW 13.4 10.0 - 15.0 %    Platelet Count 182 150 - 450 10e9/L    Diff Method Automated Method     % Neutrophils 46.0 %    % Lymphocytes 41.5 %    % Monocytes 7.3 %    % Eosinophils 4.4 %    % Basophils 0.6 %    % Immature Granulocytes 0.2 %    Nucleated RBCs 0 0 /100    Absolute Neutrophil 3.0 1.6 - 8.3 10e9/L    Absolute Lymphocytes 2.7 0.8 - 5.3 10e9/L    Absolute Monocytes 0.5 0.0 - 1.3 10e9/L    Absolute Eosinophils 0.3 0.0 - 0.7 10e9/L    Absolute Basophils 0.0 0.0 - 0.2 10e9/L    Abs Immature Granulocytes 0.0 0 - 0.4 10e9/L    Absolute Nucleated RBC 0.0    Comprehensive metabolic panel    Collection Time: 06/19/18 12:35 PM   Result Value Ref Range    Sodium 143 133 - 144 mmol/L    Potassium 4.0 3.4 - 5.3 mmol/L    Chloride 110 (H) 94 - 109 mmol/L    Carbon Dioxide 27 20 - 32 mmol/L     Anion Gap 7 3 - 14 mmol/L    Glucose 74 70 - 99 mg/dL    Urea Nitrogen 14 7 - 30 mg/dL    Creatinine 0.74 0.52 - 1.04 mg/dL    GFR Estimate 80 >60 mL/min/1.7m2    GFR Estimate If Black >90 >60 mL/min/1.7m2    Calcium 8.3 (L) 8.5 - 10.1 mg/dL    Bilirubin Total 0.2 0.2 - 1.3 mg/dL    Albumin 3.2 (L) 3.4 - 5.0 g/dL    Protein Total 7.1 6.8 - 8.8 g/dL    Alkaline Phosphatase 36 (L) 40 - 150 U/L    ALT 22 0 - 50 U/L    AST 18 0 - 45 U/L                 Follow-ups after your visit        Your next 10 appointments already scheduled     Jul 10, 2018  7:00 AM CDT   [facility] Lab Draw with UC MASONIC LAB DRAW   [facility] Lab Draw ([facility])    [address] [place] [address]  [address]  [facility] [id number] [phone]            Jul 10, 2018  7:30 AM CDT   (Arrive by 7:15 AM)   Return Visit with Lisandro Aguiar MD   [facility] ([facility])    [address] [place] [address]  [address]  [facility] [address] [phone]            Jul 10, 2018  8:30 AM CDT   Infusion 60 with  ONCOLOGY INFUSION, UC 27 ATC   [facility] ([facility])    [address] [place] [address]  [address]  [facility] [address] [phone]              Who to contact     If you have questions or need follow up information about today's clinic visit or your schedule please contact [facility] directly at [phone].  Normal or non-critical lab and imaging results will be communicated to you by MyChart, letter or phone within 4 business days after the clinic has received the results. If you do not hear from us within 7 days, please contact the clinic through MyChart or phone. If you have a critical or abnormal lab result, we will notify you by phone as soon as possible.  Submit refill requests through [employer] or call your pharmacy and they will forward the refill request to us. Please allow 3 business days for your  refill to be completed.          Additional Information About Your Visit        [employer] Information     [employer] gives you secure access to your electronic health record. If you see a primary care provider, you can also send messages to your care team and make appointments. If you have questions, please call your primary care clinic.  If you do not have a primary care provider, please call [phone] and they will assist you.        Care EveryWhere ID     This is your Care EveryWhere ID. This could be used by other organizations to access your [place] medical records  [id number]         Blood Pressure from Last 3 Encounters:   06/19/18 114/67   05/29/18 111/58   05/08/18 121/70    Weight from Last 3 Encounters:   06/19/18 80.9 kg (178 lb 6.4 oz)   05/29/18 79.8 kg (176 lb)   05/08/18 79.7 kg (175 lb 12.8 oz)              We Performed the Following     CA 27.29 Breast tumor marker     CBC with platelets differential     CEA     Comprehensive metabolic panel        Primary Care Provider Fax #    Physician No Ref-Primary [phone]       No address on file        Equal Access to Services     TONEY [facility] Center[facility]ISH : Hadarturo Ramsey, ro jefferson, jamal wilkins, alexa angulo . So [facility] [phone].    ATENCIÓN: Si habla español, tiene a lagunas disposición servicios gratuitos de asistencia lingüística. Llame al [phone].    We comply with applicable federal civil rights laws and [place] laws. We do not discriminate on the basis of race, color, national origin, age, disability, sex, sexual orientation, or gender identity.            Thank you!     Thank you for choosing [facility]  for your care. Our goal is always to provide you with excellent care. Hearing back from our patients is one way we can continue to improve our services. Please take a few minutes to complete the written survey that you may receive in the mail after your visit with us.  Thank you!             Your Updated Medication List - Protect others around you: Learn how to safely use, store and throw away your medicines at www.disposemymeds.org.          This list is accurate as of 6/19/18  2:24 PM.  Always use your most recent med list.                   Brand Name Dispense Instructions for use Diagnosis    albuterol 108 (90 Base) MCG/ACT Inhaler    PROAIR HFA/PROVENTIL HFA/VENTOLIN HFA    1 Inhaler    Inhale 2 puffs into the lungs every 6 hours as needed for shortness of breath / dyspnea or wheezing    Cough       benzonatate 200 MG capsule    TESSALON    21 capsule    Take 1 capsule (200 mg) by mouth 3 times daily as needed for cough    Cough       guaiFENesin-codeine 100-10 MG/5ML Soln solution    ROBITUSSIN AC    240 mL    Take 5-10 mLs by mouth every 6 hours as needed for cough    Cough       order for DME     1 each    L UE class 2 compression sleeve and gauntlet Night garment Bandaging supplies    Lymphedema of left upper extremity       tamoxifen 20 MG tablet    NOLVADEX    90 tablet    Take 1 tablet (20 mg) by mouth daily    Cancer of central portion of left breast (H)       VITAMIN D (CHOLECALCIFEROL) PO      Take 1,000 Units by mouth daily

## 2018-06-19 NOTE — PROGRESS NOTES
Infusion Nursing Note:  Hoda Brush presents today for C14 Herceptin.    Patient seen by provider today: Yes: Dr Aguiar    Treatment Conditions:  Lab Results   Component Value Date    HGB 11.6 06/19/2018     Lab Results   Component Value Date    WBC 6.6 06/19/2018      Lab Results   Component Value Date    ANEU 3.0 06/19/2018     Lab Results   Component Value Date     06/19/2018      Lab Results   Component Value Date     06/19/2018                   Lab Results   Component Value Date    POTASSIUM 4.0 06/19/2018           No results found for: MAG         Lab Results   Component Value Date    CR 0.74 06/19/2018                   Lab Results   Component Value Date    TAYLER 8.3 06/19/2018                Lab Results   Component Value Date    BILITOTAL 0.2 06/19/2018           Lab Results   Component Value Date    ALBUMIN 3.2 06/19/2018                    Lab Results   Component Value Date    ALT 22 06/19/2018           Lab Results   Component Value Date    AST 18 06/19/2018       Results reviewed, labs MET treatment parameters, ok to proceed with treatment.  ECHO/MUGA completed 6/19  EF 60-65%.    Intravenous Access:  Implanted Port.  Access dc'd at time of discharge.      Note:   Results reviewed, copy given to patient.  Proceed with treatment.    Copy of AVS given to patient. + Blood return from PORT pre and post infusion.  Tolerated infusion without incident. No Prescriptions filled today.   D/C in care of self.  Pt will return 7/10 for next appointment. MD did not put order in for CT so it was not scheduled prior to discharge.   sent message to Dr Aguiar to remind him.  PT aware that she should look for that appt.   present throughout entire infusion.    Susu Seay RN

## 2018-06-19 NOTE — NURSING NOTE
"Oncology Rooming Note    June 19, 2018 12:52 PM   Hoda Brush is a 63 year old female who presents for:    Chief Complaint   Patient presents with     Port Draw     Labs drawn via port by RN. Line flushed and hep locked. VS taken.     Oncology Clinic Visit     Return : Breast Cancer     Initial Vitals: /67 (BP Location: Right arm, Patient Position: Sitting, Cuff Size: Adult Regular)  Pulse 78  Temp 97.7  F (36.5  C) (Oral)  Resp 18  Ht 1.575 m (5' 2.01\")  Wt 80.9 kg (178 lb 6.4 oz)  SpO2 97%  BMI 32.62 kg/m2 Estimated body mass index is 32.62 kg/(m^2) as calculated from the following:    Height as of this encounter: 1.575 m (5' 2.01\").    Weight as of this encounter: 80.9 kg (178 lb 6.4 oz). Body surface area is 1.88 meters squared.  No Pain (0) Comment: Data Unavailable   No LMP recorded. Patient is postmenopausal.  Allergies reviewed: Yes  Medications reviewed: Yes    Medications: Medication refills not needed today.  Pharmacy name entered into [employer]:    [id number] [place] [facility] - [place], [place] - [address] [id number] - [place], [place] - [address] [facility] [address] [place] [address] [facility]UG STORE [id number] - [place], [place] - [address] AT  [address] [facility]    Clinical concerns: Yes, patient states for the last 3-4 weeks she has been having a burning feeling in her left shoulder blade.  Dr. Aguiar was notified.    10 minutes for nursing intake (face to face time)     Edwina Cristobal CMA              "

## 2018-06-19 NOTE — LETTER
6/19/2018       RE: Hoda Brush  [address] [facility] [address]     Dear Colleague,    Thank you for referring your patient, Hoda Brush, to the [facility]. Please see a copy of my visit note below.    Hoda is seen with her daughter, Ashleigh, today for a new patient visit.  The patient is a refugee from [facility] and is here for continuation of care for her metastatic ER+HER2- breast cancer.  She is a Jainism refugee from [facility] and was seen in clinic with her daughter.  The only data we have from [facility] is an English translation of her records.       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  On 02/11/2014, she was seen by an oncologist.  The clinical staging of T4b N2 M1 was noted.   Her breast cancer was on the left side. There was no right breast cancer, confirmed by the patient and her daughter, correcting a possible error in the translated records.  ER was positive in 100% of the cells, TX at 14% and HER2 was 3+ positive.  It appears that this histopathologic information is on the mastectomy specimen. She underwent an MRI for staging of presumptive bone metastases which was performed 03/02/2014.  There were skeletal metastases in the thoracic vertebrae at 12, L1, L3, L5 and S1 vertebral body, ranging in size from 0.7-3.0 cm in size.  Ischial and right femur were also involved, as well as a large iliac mass measuring about 15 cm in the uterus. 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   Radiation Oncology consultation was performed 03/11/2014, and she was given radiation in 1 dose of 6 Gy to the right iliac lesion.        With the initial diagnosis, she initiated treatment with 6 cycles of CAF neoadjuvant chemotherapy 02/14, 07/07, 03/28, 04/18/14, 05/08 and 05/29/14. She also received monthly zoledronic acid.  She then underwent a modified left mastectomy of Palacios type and left  lymphadenoectomy on 06/27/2014.   Pathologic examination showed number at [facility] was [id number] showed infiltrative grade 2 ductal cancer with 6 level 1 metastatic lymph nodes and 3 level 2 metastatic lymph nodes.  The differentiation was intermediate.  The tumor was ER positive 70% of the cells, W[place] positive in 40% of the cells and HER2 was 3+ by immunohistochemistry and the Ki-67 labeling index was 20%. Her staging after surgery was stage IV, pT4b N2 M1.  I don't see a biopsy of the skeletal metastases.  She then had continuation with chemotherapy with 4 cycles of Herceptin and taxane with monthly zoledronic acid.  Tamoxifen was initiated.  She then had two years of Herceptin and a decision was made not to continue further Herceptin.  She continued on zoledronic acid every 3 months. She was clinically stable. [place] then [place] the [place] new [place] from [facility].  She arrived last month, established [place] residency and now seeks further oncology care.       TREATMENT HISTORY:  A. Initial diagnosis with metastatic breast cancer in [facility].  Neoadjuvant CAF x 6.   B. Left mastectomy. Left axillary node dissection.  C.  Radiation in 1 dose to R iliac region. g Gy.  C. Herceptin for 2 years taxane for a prescribed course then stopped, monthly zoledronic acid.  She had 2 years of Herceptin with tamoxifen added after chemotherapy.  D.  Tamoxifen alone and zoledronic acid every 3 months.  [place]  [place]  We restarted Herceptin every 3 weeks and continued tamoxifen. Bone targeted agent changed to denosumab every 9 weeks.    INTERVAL HISTORY:  Hoda returns to clinic and has been doing quite well.  She has no pain, no fatigue, no depression, no anxiety.  She does have a little bit of discomfort in the left shoulder blade area that is a new problem for her. There is no pain but there is some minor burning.  We will be obtaining a CT scan of the chest, abdomen and pelvis in 3 weeks and she would like  to wait until then.  The discomfort is not waking her at night.      REVIEW OF SYSTEMS:  She has no fevers or chills, cough, chest pain, shortness of breath, nausea, vomiting, constipation, diarrhea, bone pain, back pain or headache.  The remainder of a 10-point review of systems is negative.  She does report that she occasionally has some right hip discomfort which dates from the time when she was first diagnosed with metastatic breast cancer in 2014.  She is taking vitamin D and calcium.  She has been eating a healthful diet and she has been exercising.      PHYSICAL EXAMINATION:   VITAL SIGNS:  Blood pressure 114/67, temperature 97.7, pulse 78, respirations 18, O2 sat 97% on room air, height 1.6 meters and weight 80.9 kg.   GENERAL:  Hoda appeared generally well.  She has no alopecia.   HEENT:  Oropharynx is without lesions.   LYMPH:  There is no palpable cervical, supraclavicular, subclavicular or axillary lymphadenopathy.   BREASTS:  Examination of right breast reveals no masses.  Examination of the left anterior chest wall reveals a mastectomy incision that is well healed without erythema or masses.  No masses in the anterior chest wall bilaterally.   LUNGS:  Clear to percussion and auscultation.   HEART:  Regular rate and rhythm, S1, S2.   ABDOMEN:  Soft and nontender, consistent with increased BMI.   EXTREMITIES:  Without edema.   PSYCHIATRIC:  Mood and affect were normal.      LABORATORY DATA:  The CMP is remarkable for an albumin of 3.2.  CBC within normal limits except for hemoglobin of 11.6.  The echocardiogram showed an ejection fraction of 60%-65%.      ASSESSMENT AND PLAN:     1.  Hoda Brush is a 63-year-old woman with a history of ER-positive, WV-positive, HER2-positive breast cancer.  She is from [facility], formerly from [facility] and came [place] live [place]  She lives with her daughter and is here for continuation of every-3-week Herceptin and tamoxifen.  Her tumor is ER-positive, WV-positive  and HER2 positive.  She had metastatic disease at the time of presentation with bone only metastases by report.  She presented and was diagnosed with metastatic breast cancer in 02/2014 and her staging initially was a stage IV, T4N2M1 invasive ductal carcinoma of the left breast.  She had right hip metastases.  She underwent neoadjuvant CAF, left mastectomy, left axillary lymph node dissection and radiation.  She had radiation of the right iliac region where she has metastatic disease.  Pathology report from [facility] shows the tumor to be ER positive in 75% of cells, VT positive in 40% of the cells, and HER2 was 3+ positive by immunohistochemistry.  Ki-67 was 20%.  She had no evidence of distant metastatic disease in her PET/CT except for multiple osseous foci of increased radiotracer uptake consistent with her metastatic disease.  This PET/CT was from 08/2017 and was apparently unchanged compared with 06/12/2014 exam.   2. Complaint of left shoulder burning. She complains of minor discomfort that doesn't bother her most of the time.  My level of suspicion of the left shoulder burning is a relatively low with regard to metastatic disease, but the CT scan could be helpful.  At this time, this does not represent a major problem for her and she is having no difficulty sleeping at night and no pain.  3.  Restaging.  A CT of the chest, abdomen and pelvis will be performed before her next followup visit.  Lymphedema referral has been placed.  Genetic testing was negative.  The plan is to continue Xgeva every 9 weeks.     4.  Followup.  We will see Hoda in followup in our clinic in 3 weeks and she will receive Xgeva at that time.  We will perform a CT scan of the chest, abdomen and pelvis before this visit for restaging.   CT CAP 7-9-18 and CBC, CMP, CA27.29 and CEA and follow up appointment with me on 7-10 with Herceptin.        Thank you for allowing us to continue to participate in Hoda's care.      Lisandro Aguiar,  MD      [facility]       I spent 30 minutes with the patient more than 50% of which was in counseling and coordination of care.     Again, thank you for allowing me to participate in the care of your patient.      Sincerely,    Lisandro Aguiar MD

## 2018-07-05 ENCOUNTER — RADIANT APPOINTMENT (OUTPATIENT)
Dept: CT IMAGING | Facility: CLINIC | Age: 63
End: 2018-07-05
Attending: INTERNAL MEDICINE
Payer: COMMERCIAL

## 2018-07-05 DIAGNOSIS — C50.912 MALIGNANT NEOPLASM OF LEFT FEMALE BREAST, UNSPECIFIED ESTROGEN RECEPTOR STATUS, UNSPECIFIED SITE OF BREAST (H): ICD-10-CM

## 2018-07-05 RX ORDER — IOPAMIDOL 755 MG/ML
109 INJECTION, SOLUTION INTRAVASCULAR ONCE
Status: COMPLETED | OUTPATIENT
Start: 2018-07-05 | End: 2018-07-05

## 2018-07-05 RX ORDER — HEPARIN SODIUM (PORCINE) LOCK FLUSH IV SOLN 100 UNIT/ML 100 UNIT/ML
5 SOLUTION INTRAVENOUS ONCE
Status: COMPLETED | OUTPATIENT
Start: 2018-07-05 | End: 2018-07-05

## 2018-07-05 RX ADMIN — IOPAMIDOL 109 ML: 755 INJECTION, SOLUTION INTRAVASCULAR at 13:44

## 2018-07-05 RX ADMIN — HEPARIN SODIUM (PORCINE) LOCK FLUSH IV SOLN 100 UNIT/ML 5 ML: 100 SOLUTION at 14:18

## 2018-07-05 NOTE — DISCHARGE INSTRUCTIONS

## 2018-07-06 ENCOUNTER — TELEPHONE (OUTPATIENT)
Dept: ONCOLOGY | Facility: CLINIC | Age: 63
End: 2018-07-06

## 2018-07-06 NOTE — TELEPHONE ENCOUNTER
I spoke with Hoda's daughter who has permission from the patient to be involved in her healthcare and let her know that the CT scan showed stable findings.     Lisandro Aguiar MD

## 2018-07-08 NOTE — PROGRESS NOTES
Hoda is seen with a [facility] . The patient is a refugee from [facility] and is here for continuation of care for her metastatic ER+HER2- breast cancer.  She is a Episcopal refugee from [facility] and was seen in clinic with her daughter.  The only data we have from [facility] is an English translation of her records.       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  On 02/11/2014, she was seen by an oncologist.  The clinical staging of T4b N2 M1 was noted.   Her breast cancer was on the left side. There was no right breast cancer, confirmed by the patient and her daughter, correcting a possible error in the translated records.  ER was positive in 100% of the cells, CA at 14% and HER2 was 3+ positive.  It appears that this histopathologic information is on the mastectomy specimen. She underwent an MRI for staging of presumptive bone metastases which was performed 03/02/2014.  There were skeletal metastases in the thoracic vertebrae at 12, L1, L3, L5 and S1 vertebral body, ranging in size from 0.7-3.0 cm in size.  Ischial and right femur were also involved, as well as a large iliac mass measuring about 15 cm in the uterus. 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   Radiation Oncology consultation was performed 03/11/2014, and she was given radiation in 1 dose of 6 Gy to the right iliac lesion.        With the initial diagnosis, she initiated treatment with 6 cycles of CAF neoadjuvant chemotherapy 02/14, 07/07, 03/28, 04/18/14, 05/08 and 05/29/14. She also received monthly zoledronic acid.  She then underwent a modified left mastectomy of Palacios type and left lymphadenoectomy on 06/27/2014.   Pathologic examination showed number at [facility] was 100177-191/14 showed infiltrative grade 2 ductal cancer with 6 level 1 metastatic lymph nodes and 3 level 2 metastatic lymph nodes.  The differentiation was intermediate.  The tumor was ER  positive 70% of the cells, MO positive in 40% of the cells and HER2 was 3+ by immunohistochemistry and the Ki-67 labeling index was 20%. Her staging after surgery was stage IV, pT4b N2 M1.  I don't see a biopsy of the skeletal metastases.  She then had continuation with chemotherapy with 4 cycles of Herceptin and taxane with monthly zoledronic acid.  Tamoxifen was initiated.  She then had two years of Herceptin and a decision was made not to continue further Herceptin.  She continued on zoledronic acid every 3 months. She was clinically stable. She then moved [place] the [place] new refugee from [facility].  She arrived last month, established [place] residency and now seeks further oncology care.       TREATMENT HISTORY:  A. Initial diagnosis with metastatic breast cancer in [facility].  Neoadjuvant CAF x 6.   B. Left mastectomy. Left axillary node dissection.  C.  Radiation in 1 dose to R iliac region. g Gy.  C. Herceptin for 2 years taxane for a prescribed course then stopped, monthly zoledronic acid.  She had 2 years of Herceptin with tamoxifen added after chemotherapy.  D.  Tamoxifen alone and zoledronic acid every 3 months.  E.  Move to [facility].  We restarted Herceptin every 3 weeks and continued tamoxifen. Bone targeted agent changed to denosumab every 9 weeks.     INTERVAL HISTORY:  Hoda returns to clinic.  She has been feeling generally well.  She has no pain, mild fatigue because of sleeping problems, no depression, no anxiety.  She has significant insomnia and has problems with continued thoughts which make it hard for her to go to sleep.  She has had no vaginal bleeding.      REVIEW OF SYSTEMS:  She denies fevers or chills, cough, chest pain, shortness of breath, nausea, vomiting, constipation, diarrhea, bone pain, back pain or headache.  The remainder of a 10-point review of systems is negative.      PHYSICAL EXAMINATION:   VITAL SIGNS:  Blood pressure was 104/67, temperature 98, pulse 72, respirations 16,  O2 sat 95% on room air.  Height 1.6 meters and weight 81.2 kg.   GENERAL:  Hoda appeared generally well, no alopecia.   HEENT:  Oropharynx is without lesions.   LYMPH:  There is no palpable cervical, supraclavicular, subclavicular or axillary lymphadenopathy.   BREASTS:  Exam was not performed today.   LUNGS:  Clear to percussion and auscultation.   HEART:  Regular rate and rhythm, S1, S2.   ABDOMEN:  Soft and nontender without hepatosplenomegaly.   EXTREMITIES:  Without edema.   PSYCHIATRIC:  Mood and affect were normal.      LABORATORY DATA:  The CBC was remarkable for hemoglobin of 11.6, otherwise within normal limits.  CMP is pending.      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       EXAMINATION: CT CHEST/ABDOMEN/PELVIS W CONTRAST, 7/5/2018 1:54 PM     TECHNIQUE:  Helical CT images from the thoracic inlet through the  symphysis pubis were obtained  with contrast. Contrast dose: ISOVUE  370 109cc     COMPARISON: CT cap 3/28/2018; PET/CT 12/21/2017, 8/21/2017     HISTORY: restaging; Malignant neoplasm of left female breast,  unspecified estrogen receptor status, unspecified site of breast (H)     FINDINGS:  Chest: The heart size is within normal limits. No pericardial  effusion. Right IJ Port-A-Cath with the tip in the cavoatrial  junction. No enlarged thoracic lymph nodes. No suspicious thyroid  nodule. Postoperative changes of left breast mastectomy.     Central airways are patent. No pleural effusion or pneumothorax. No  evidence of infection in the chest.     Subcentimeter pulmonary nodules with index nodules as described below  from  series 10:  Image 79: Unchanged 2 mm solid pulmonary nodule in the right middle  lobe.  Image 98: Solid 5 mm pulmonary nodule in the left lower lobe,  unchanged.  Additional sub-3 mm pulmonary nodules are also unchanged. No new  pulmonary nodule.  Abdomen and pelvis: The liver, spleen, adrenal glands, and pancreas  are normal. The gallbladder is decompressed with the patient's small  calcified gallstones. No hydronephrosis, renal mass, or  nephrolithiasis. No hydroureter. Urinary bladder is decompressed.     Unchanged myomatous uterus. No enlarged inguinal, pelvic, or  intra-abdominal lymph nodes by CT short axis size criteria.     No intraperitoneal free air or free fluid.     Bones and soft tissues: Numerous osseous sclerotic lesions, for  example T7, T8, T9, T11, L1, L3, L5 which are not significantly  changed from the prior CT. Sclerotic expansile lesion in the right  fifth rib. Sclerotic and lucent lesion in the sternum and proximal  right femur (series 8 image 53). No newly identified osseous sclerotic  lesion. Grossly unchanged sclerotic and expansile iliac bones.         IMPRESSION:   In this patient with history of cancer, no evidence of recurrent or  new metastatic disease:  1a. Unchanged sclerotic lesions compatible with treated osseous  metastatic disease.  1b. Stable sub-6 mm pulmonary nodules, with the largest nodule in the  left lung base  2. Multiple uterine fibroids.  3. 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     I have personally reviewed the examination and initial interpretation  and I agree with the findings.     LONG SUTTON MD       ASSESSMENT AND PLAN:     1.  Hoda Brush is a 63-year-old woman with a history of ER-positive, [place]-positive, HER2-positive breast cancer.  She is from [facility], formally from [facility] and came [place] live [place]  She lives with her daughter and is here for continuation of every-3-week Herceptin and  tamoxifen.  Her tumor is ER positive, KS positive, HER2 positive.  She has metastatic disease at time of presentation with bone only metastases by report.  She presented with metastatic disease in 02/2014.  Staging was initially stage IV, T4N2M1 invasive ductal carcinoma of the left breast.  She had right hip metastasis.  She underwent neoadjuvant CAF, left mastectomy, left axillary lymph node dissection and radiation.  She had radiation of the right iliac region where she had metastatic disease.  Pathology report from [facility] shows the tumor to be positive for ER in 75% of cells, KS in 40% of the cells, and HER2 was 3+ positive by immunohistochemistry.  Ki-67 was 20%.  She had no evidence of distant metastatic disease on her PET CT from 08/2017.   2.  Left shoulder burning.  This problem has been very intermittent and we will not do any diagnostic studies at this time.   3.  Insomnia.  We prescribed mirtazapine 15 mg at bedtime.   4.  Restaging will be performed in 12 weeks.   5.  Followup.  We will see Hoda in followup in our clinic every other Herceptin treatment, which will be every 6 weeks.  All of her questions were answered. Follow up for Herceptin 7-31 and 8-21 with KARISSA.  Follow up with me 9-11 and with KARISSA 10-2 with CT CAP on 10-1.  CBC, CMP, CA27.29 and CEA with follow up visits. Echocardiogram 10-2-18.       Thank you for allowing us to continue to participate in Hoda Brush's care.      Lisandro Aguiar MD      [facility]            I spent 30 minutes with the patient more than 50% of which was in counseling and coordination of care.

## 2018-07-10 ENCOUNTER — ONCOLOGY VISIT (OUTPATIENT)
Dept: ONCOLOGY | Facility: CLINIC | Age: 63
End: 2018-07-10
Attending: INTERNAL MEDICINE
Payer: COMMERCIAL

## 2018-07-10 ENCOUNTER — APPOINTMENT (OUTPATIENT)
Dept: LAB | Facility: CLINIC | Age: 63
End: 2018-07-10
Attending: INTERNAL MEDICINE
Payer: COMMERCIAL

## 2018-07-10 VITALS
DIASTOLIC BLOOD PRESSURE: 67 MMHG | HEIGHT: 62 IN | RESPIRATION RATE: 16 BRPM | SYSTOLIC BLOOD PRESSURE: 104 MMHG | OXYGEN SATURATION: 95 % | BODY MASS INDEX: 32.94 KG/M2 | HEART RATE: 72 BPM | TEMPERATURE: 98 F | WEIGHT: 179 LBS

## 2018-07-10 DIAGNOSIS — G47.00 PERSISTENT INSOMNIA: ICD-10-CM

## 2018-07-10 DIAGNOSIS — C79.51 BONE METASTASIS: ICD-10-CM

## 2018-07-10 DIAGNOSIS — C50.912 MALIGNANT NEOPLASM OF LEFT FEMALE BREAST, UNSPECIFIED ESTROGEN RECEPTOR STATUS, UNSPECIFIED SITE OF BREAST (H): Primary | ICD-10-CM

## 2018-07-10 DIAGNOSIS — Z51.11 ENCOUNTER FOR ANTINEOPLASTIC CHEMOTHERAPY: ICD-10-CM

## 2018-07-10 LAB
ALBUMIN SERPL-MCNC: 3.1 G/DL (ref 3.4–5)
ALP SERPL-CCNC: 34 U/L (ref 40–150)
ALT SERPL W P-5'-P-CCNC: 20 U/L (ref 0–50)
ANION GAP SERPL CALCULATED.3IONS-SCNC: 9 MMOL/L (ref 3–14)
AST SERPL W P-5'-P-CCNC: 20 U/L (ref 0–45)
BASOPHILS # BLD AUTO: 0 10E9/L (ref 0–0.2)
BASOPHILS NFR BLD AUTO: 0.5 %
BILIRUB SERPL-MCNC: 0.3 MG/DL (ref 0.2–1.3)
BUN SERPL-MCNC: 13 MG/DL (ref 7–30)
CALCIUM SERPL-MCNC: 8.3 MG/DL (ref 8.5–10.1)
CANCER AG27-29 SERPL-ACNC: 11 U/ML (ref 0–39)
CEA SERPL-MCNC: 0.5 UG/L (ref 0–2.5)
CHLORIDE SERPL-SCNC: 107 MMOL/L (ref 94–109)
CO2 SERPL-SCNC: 27 MMOL/L (ref 20–32)
CREAT SERPL-MCNC: 0.75 MG/DL (ref 0.52–1.04)
DIFFERENTIAL METHOD BLD: ABNORMAL
EOSINOPHIL # BLD AUTO: 0.2 10E9/L (ref 0–0.7)
EOSINOPHIL NFR BLD AUTO: 4 %
ERYTHROCYTE [DISTWIDTH] IN BLOOD BY AUTOMATED COUNT: 13.2 % (ref 10–15)
GFR SERPL CREATININE-BSD FRML MDRD: 78 ML/MIN/1.7M2
GLUCOSE SERPL-MCNC: 84 MG/DL (ref 70–99)
HCT VFR BLD AUTO: 37.6 % (ref 35–47)
HGB BLD-MCNC: 11.6 G/DL (ref 11.7–15.7)
IMM GRANULOCYTES # BLD: 0 10E9/L (ref 0–0.4)
IMM GRANULOCYTES NFR BLD: 0.3 %
LYMPHOCYTES # BLD AUTO: 2.5 10E9/L (ref 0.8–5.3)
LYMPHOCYTES NFR BLD AUTO: 42.2 %
MCH RBC QN AUTO: 30.1 PG (ref 26.5–33)
MCHC RBC AUTO-ENTMCNC: 30.9 G/DL (ref 31.5–36.5)
MCV RBC AUTO: 97 FL (ref 78–100)
MONOCYTES # BLD AUTO: 0.4 10E9/L (ref 0–1.3)
MONOCYTES NFR BLD AUTO: 6.3 %
NEUTROPHILS # BLD AUTO: 2.8 10E9/L (ref 1.6–8.3)
NEUTROPHILS NFR BLD AUTO: 46.7 %
NRBC # BLD AUTO: 0 10*3/UL
NRBC BLD AUTO-RTO: 0 /100
PLATELET # BLD AUTO: 188 10E9/L (ref 150–450)
POTASSIUM SERPL-SCNC: 4 MMOL/L (ref 3.4–5.3)
PROT SERPL-MCNC: 6.9 G/DL (ref 6.8–8.8)
RBC # BLD AUTO: 3.86 10E12/L (ref 3.8–5.2)
SODIUM SERPL-SCNC: 143 MMOL/L (ref 133–144)
WBC # BLD AUTO: 6 10E9/L (ref 4–11)

## 2018-07-10 PROCEDURE — 80053 COMPREHEN METABOLIC PANEL: CPT | Performed by: INTERNAL MEDICINE

## 2018-07-10 PROCEDURE — 82040 ASSAY OF SERUM ALBUMIN: CPT | Performed by: INTERNAL MEDICINE

## 2018-07-10 PROCEDURE — 25000128 H RX IP 250 OP 636: Mod: ZF | Performed by: INTERNAL MEDICINE

## 2018-07-10 PROCEDURE — 85025 COMPLETE CBC W/AUTO DIFF WBC: CPT | Performed by: INTERNAL MEDICINE

## 2018-07-10 PROCEDURE — T1013 SIGN LANG/ORAL INTERPRETER: HCPCS | Mod: U3,ZF

## 2018-07-10 PROCEDURE — 86300 IMMUNOASSAY TUMOR CA 15-3: CPT | Performed by: INTERNAL MEDICINE

## 2018-07-10 PROCEDURE — 99214 OFFICE O/P EST MOD 30 MIN: CPT | Mod: ZP | Performed by: INTERNAL MEDICINE

## 2018-07-10 PROCEDURE — 82378 CARCINOEMBRYONIC ANTIGEN: CPT | Performed by: INTERNAL MEDICINE

## 2018-07-10 PROCEDURE — G0463 HOSPITAL OUTPT CLINIC VISIT: HCPCS | Mod: ZF

## 2018-07-10 PROCEDURE — 96413 CHEMO IV INFUSION 1 HR: CPT

## 2018-07-10 PROCEDURE — 96372 THER/PROPH/DIAG INJ SC/IM: CPT | Mod: 59

## 2018-07-10 RX ORDER — DIPHENHYDRAMINE HYDROCHLORIDE 50 MG/ML
50 INJECTION INTRAMUSCULAR; INTRAVENOUS
Status: CANCELLED
Start: 2018-07-10

## 2018-07-10 RX ORDER — ALBUTEROL SULFATE 0.83 MG/ML
2.5 SOLUTION RESPIRATORY (INHALATION)
Status: CANCELLED | OUTPATIENT
Start: 2018-07-10

## 2018-07-10 RX ORDER — ACETAMINOPHEN 325 MG/1
650 TABLET ORAL
Status: CANCELLED | OUTPATIENT
Start: 2018-07-10

## 2018-07-10 RX ORDER — HEPARIN SODIUM (PORCINE) LOCK FLUSH IV SOLN 100 UNIT/ML 100 UNIT/ML
5 SOLUTION INTRAVENOUS EVERY 8 HOURS
Status: CANCELLED | OUTPATIENT
Start: 2018-07-10

## 2018-07-10 RX ORDER — DIPHENHYDRAMINE HCL 25 MG
50 CAPSULE ORAL ONCE
Status: CANCELLED
Start: 2018-07-10

## 2018-07-10 RX ORDER — MIRTAZAPINE 15 MG/1
15 TABLET, FILM COATED ORAL AT BEDTIME
Qty: 90 TABLET | Refills: 3 | Status: SHIPPED | OUTPATIENT
Start: 2018-07-10 | End: 2018-08-22

## 2018-07-10 RX ORDER — METHYLPREDNISOLONE SODIUM SUCCINATE 125 MG/2ML
125 INJECTION, POWDER, LYOPHILIZED, FOR SOLUTION INTRAMUSCULAR; INTRAVENOUS
Status: CANCELLED
Start: 2018-07-10

## 2018-07-10 RX ORDER — HEPARIN SODIUM (PORCINE) LOCK FLUSH IV SOLN 100 UNIT/ML 100 UNIT/ML
5 SOLUTION INTRAVENOUS EVERY 8 HOURS
Status: DISCONTINUED | OUTPATIENT
Start: 2018-07-10 | End: 2018-07-10 | Stop reason: HOSPADM

## 2018-07-10 RX ORDER — SODIUM CHLORIDE 9 MG/ML
1000 INJECTION, SOLUTION INTRAVENOUS CONTINUOUS PRN
Status: CANCELLED
Start: 2018-07-10

## 2018-07-10 RX ORDER — LORAZEPAM 2 MG/ML
0.5 INJECTION INTRAMUSCULAR EVERY 4 HOURS PRN
Status: CANCELLED
Start: 2018-07-10

## 2018-07-10 RX ORDER — ALBUTEROL SULFATE 90 UG/1
1-2 AEROSOL, METERED RESPIRATORY (INHALATION)
Status: CANCELLED
Start: 2018-07-10

## 2018-07-10 RX ORDER — EPINEPHRINE 0.3 MG/.3ML
0.3 INJECTION SUBCUTANEOUS EVERY 5 MIN PRN
Status: CANCELLED | OUTPATIENT
Start: 2018-07-10

## 2018-07-10 RX ORDER — MEPERIDINE HYDROCHLORIDE 25 MG/ML
25 INJECTION INTRAMUSCULAR; INTRAVENOUS; SUBCUTANEOUS EVERY 30 MIN PRN
Status: CANCELLED | OUTPATIENT
Start: 2018-07-10

## 2018-07-10 RX ORDER — HEPARIN SODIUM (PORCINE) LOCK FLUSH IV SOLN 100 UNIT/ML 100 UNIT/ML
5 SOLUTION INTRAVENOUS
Status: COMPLETED | OUTPATIENT
Start: 2018-07-10 | End: 2018-07-10

## 2018-07-10 RX ORDER — EPINEPHRINE 1 MG/ML
0.3 INJECTION, SOLUTION INTRAMUSCULAR; SUBCUTANEOUS EVERY 5 MIN PRN
Status: CANCELLED | OUTPATIENT
Start: 2018-07-10

## 2018-07-10 RX ADMIN — DENOSUMAB 120 MG: 120 INJECTION SUBCUTANEOUS at 10:07

## 2018-07-10 RX ADMIN — TRASTUZUMAB 450 MG: 150 INJECTION, POWDER, LYOPHILIZED, FOR SOLUTION INTRAVENOUS at 09:39

## 2018-07-10 RX ADMIN — SODIUM CHLORIDE, PRESERVATIVE FREE 5 ML: 5 INJECTION INTRAVENOUS at 10:10

## 2018-07-10 RX ADMIN — SODIUM CHLORIDE, PRESERVATIVE FREE 5 ML: 5 INJECTION INTRAVENOUS at 07:38

## 2018-07-10 ASSESSMENT — PAIN SCALES - GENERAL: PAINLEVEL: NO PAIN (0)

## 2018-07-10 NOTE — NURSING NOTE
"Chief Complaint   Patient presents with     Port Draw     labs drawn from port by rn. vs taken     (Honduran  present throughout appt)  Port accessed with 20 gauge 3/4\" gripper needle and labs drawn by rn.  Port flushed with NS and heparin.  Pt tolerated well.  VS taken.  Pt checked in for next appt.  Olga Hoff RN      "

## 2018-07-10 NOTE — NURSING NOTE
"Oncology Rooming Note    July 10, 2018 8:30 AM   Hoda Brush is a 63 year old female who presents for:    Chief Complaint   Patient presents with     Port Draw     labs drawn from port by rn. vs taken     Oncology Clinic Visit     Return: Breast     Initial Vitals: /67 (BP Location: Right arm, Patient Position: Sitting, Cuff Size: Adult Large)  Pulse 72  Temp 98  F (36.7  C) (Oral)  Resp 16  Ht 1.575 m (5' 2\")  Wt 81.2 kg (179 lb)  SpO2 95%  BMI 32.74 kg/m2 Estimated body mass index is 32.74 kg/(m^2) as calculated from the following:    Height as of this encounter: 1.575 m (5' 2\").    Weight as of this encounter: 81.2 kg (179 lb). Body surface area is 1.88 meters squared.  No Pain (0) Comment: Data Unavailable   No LMP recorded. Patient is postmenopausal.  Allergies reviewed: Yes  Medications reviewed: Yes    Medications: Medication refills not needed today.  Pharmacy name entered into [employer] 3D:    [id number] - [place], [place] - [address] [id number] - [place], [place] - [address] AT [facility] & [address] [place] [address] [id number] - [place], [place] - [address] AT  [address] [facility]    Clinical concerns: no new concerns today Dr. Aguiar was notified.    10 minutes for nursing intake (face to face time)     Ben Lechuga CMA              "

## 2018-07-10 NOTE — LETTER
7/10/2018       RE: Hoda Brush  [address] [facility] [address]     Dear Colleague,    Thank you for referring your patient, Hoda Brush, to the [facility]. Please see a copy of my visit note below.    Hoda is seen with a Bur[place]ian . The patient is a refugee from [facility] and is here for continuation of care for her metastatic ER+HER2- breast cancer.  She is a [employer] refugee from [facility] and was seen in clinic with her daughter.  The only data we have from [facility] is an English translation of her records.       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  On 02/11/2014, she was seen by an oncologist.  The clinical staging of T4b N2 M1 was noted.   Her breast cancer was on the left side. There was no right breast cancer, confirmed by the patient and her daughter, correcting a possible error in the translated records.  ER was positive in 100% of the cells, OK at 14% and HER2 was 3+ positive.  It appears that this histopathologic information is on the mastectomy specimen. She underwent an MRI for staging of presumptive bone metastases which was performed 03/02/2014.  There were skeletal metastases in the thoracic vertebrae at 12, L1, L3, L5 and S1 vertebral body, ranging in size from 0.7-3.0 cm in size.  Ischial and right femur were also involved, as well as a large iliac mass measuring about 15 cm in the uterus. 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   Radiation Oncology consultation was performed 03/11/2014, and she was given radiation in 1 dose of 6 Gy to the right iliac lesion.        With the initial diagnosis, she initiated treatment with 6 cycles of CAF neoadjuvant chemotherapy 02/14, 07/07, 03/28, 04/18/14, 05/08 and 05/29/14. She also received monthly zoledronic acid.  She then underwent a modified left mastectomy of Palacios type and left lymphadenoectomy on 06/27/2014.    Pathologic examination showed number at [facility] was [id number] showed infiltrative grade 2 ductal cancer with 6 level 1 metastatic lymph nodes and 3 level 2 metastatic lymph nodes.  The differentiation was intermediate.  The tumor was ER positive 70% of the cells, CA positive in 40% of the cells and HER2 was 3+ by immunohistochemistry and the Ki-67 labeling index was 20%. Her staging after surgery was stage IV, pT4b N2 M1.  I don't see a biopsy of the skeletal metastases.  She then had continuation with chemotherapy with 4 cycles of Herceptin and taxane with monthly zoledronic acid.  Tamoxifen was initiated.  She then had two years of Herceptin and a decision was made not to continue further Herceptin.  She continued on zoledronic acid every 3 months. She was clinically stable. [place] then [place] the [place] new refugee from [facility].  She arrived last month, established [place] residency and now seeks further oncology care.       TREATMENT HISTORY:  A. Initial diagnosis with metastatic breast cancer in [facility].  Neoadjuvant CAF x 6.   B. Left mastectomy. Left axillary node dissection.  C.  Radiation in 1 dose to R iliac region. g Gy.  C. Herceptin for 2 years taxane for a prescribed course then stopped, monthly zoledronic acid.  She had 2 years of Herceptin with tamoxifen added after chemotherapy.  D.  Tamoxifen alone and zoledronic acid every 3 months.  E.  [place]  We restarted Herceptin every 3 weeks and continued tamoxifen. Bone targeted agent changed to denosumab every 9 weeks.     INTERVAL HISTORY:  Hoda returns to clinic.  She has been feeling generally well.  She has no pain, mild fatigue because of sleeping problems, no depression, no anxiety.  She has significant insomnia and has problems with continued thoughts which make it hard for her to go to sleep.  She has had no vaginal bleeding.      REVIEW OF SYSTEMS:  She denies fevers or chills, cough, chest pain, shortness of breath,  nausea, vomiting, constipation, diarrhea, bone pain, back pain or headache.  The remainder of a 10-point review of systems is negative.      PHYSICAL EXAMINATION:   VITAL SIGNS:  Blood pressure was 104/67, temperature 98, pulse 72, respirations 16, O2 sat 95% on room air.  Height 1.6 meters and weight 81.2 kg.   GENERAL:  Hoda appeared generally well, no alopecia.   HEENT:  Oropharynx is without lesions.   LYMPH:  There is no palpable cervical, supraclavicular, subclavicular or axillary lymphadenopathy.   BREASTS:  Exam was not performed today.   LUNGS:  Clear to percussion and auscultation.   HEART:  Regular rate and rhythm, S1, S2.   ABDOMEN:  Soft and nontender without hepatosplenomegaly.   EXTREMITIES:  Without edema.   PSYCHIATRIC:  Mood and affect were normal.      LABORATORY DATA:  The CBC was remarkable for hemoglobin of 11.6, otherwise within normal limits.  CMP is pending.      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       EXAMINATION: CT CHEST/ABDOMEN/PELVIS W CONTRAST, 7/5/2018 1:54 PM     TECHNIQUE:  Helical CT images from the thoracic inlet through the  symphysis pubis were obtained  with contrast. Contrast dose: ISOVUE  370 109cc     COMPARISON: CT cap 3/28/2018; PET/CT 12/21/2017, 8/21/2017     HISTORY: restaging; Malignant neoplasm of left female breast,  unspecified estrogen receptor status, unspecified site of breast (H)     FINDINGS:  Chest: The heart size is within normal limits. No pericardial  effusion. Right IJ Port-A-Cath with the tip in the cavoatrial  junction. No enlarged thoracic lymph nodes. No suspicious  thyroid  nodule. Postoperative changes of left breast mastectomy.     Central airways are patent. No pleural effusion or pneumothorax. No  evidence of infection in the chest.     Subcentimeter pulmonary nodules with index nodules as described below  from series 10:  Image 79: Unchanged 2 mm solid pulmonary nodule in the right middle  lobe.  Image 98: Solid 5 mm pulmonary nodule in the left lower lobe,  unchanged.  Additional sub-3 mm pulmonary nodules are also unchanged. No new  pulmonary nodule.  Abdomen and pelvis: The liver, spleen, adrenal glands, and pancreas  are normal. The gallbladder is decompressed with the patient's small  calcified gallstones. No hydronephrosis, renal mass, or  nephrolithiasis. No hydroureter. Urinary bladder is decompressed.     Unchanged myomatous uterus. No enlarged inguinal, pelvic, or  intra-abdominal lymph nodes by CT short axis size criteria.     No intraperitoneal free air or free fluid.     Bones and soft tissues: Numerous osseous sclerotic lesions, for  example T7, T8, T9, T11, L1, L3, L5 which are not significantly  changed from the prior CT. Sclerotic expansile lesion in the right  fifth rib. Sclerotic and lucent lesion in the sternum and proximal  right femur (series 8 image 53). No newly identified osseous sclerotic  lesion. Grossly unchanged sclerotic and expansile iliac bones.         IMPRESSION:   In this patient with history of cancer, no evidence of recurrent or  new metastatic disease:  1a. Unchanged sclerotic lesions compatible with treated osseous  metastatic disease.  1b. Stable sub-6 mm pulmonary nodules, with the largest nodule in the  left lung base  2. Multiple uterine fibroids.  3. 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     I have personally reviewed the examination and initial interpretation  and I agree with the findings.     LONG SUTTON MD       ASSESSMENT AND PLAN:     1.  Hoda Brush is a  63-year-old woman with a history of ER-positive, IL-positive, HER2-positive breast cancer.  She is from [facility], formally from [facility] and came [place]  She lives with her daughter and is here for continuation of every-3-week Herceptin and tamoxifen.  Her tumor is ER positive, IL positive, HER2 positive.  She has metastatic disease at time of presentation with bone only metastases by report.  She presented with metastatic disease in 02/2014.  Staging was initially stage IV, T4N2M1 invasive ductal carcinoma of the left breast.  She had right hip metastasis.  She underwent neoadjuvant CAF, left mastectomy, left axillary lymph node dissection and radiation.  She had radiation of the right iliac region where she had metastatic disease.  Pathology report from [facility] shows the tumor to be positive for ER in 75% of cells, IL in 40% of the cells, and HER2 was 3+ positive by immunohistochemistry.  Ki-67 was 20%.  She had no evidence of distant metastatic disease on her PET CT from 08/2017.   2.  Left shoulder burning.  This problem has been very intermittent and we will not do any diagnostic studies at this time.   3.  Insomnia.  We prescribed mirtazapine 15 mg at bedtime.   4.  Restaging will be performed in 12 weeks.   5.  Followup.  We will see Hoda in followup in our clinic every other Herceptin treatment, which will be every 6 weeks.  All of her questions were answered. Follow up for Herceptin 7-31 and 8-21 with KARISSA.  Follow up with me 9-11 and with KARISSA 10-2 with CT CAP on 10-1.  CBC, CMP, CA27.29 and CEA with follow up visits. Echocardiogram 10-2-18.       Thank you for allowing us to continue to participate in Hoda Herreramilagropatricio's care.      Lisandro Aguiar MD      [facility]            I spent 30 minutes with the patient more than 50% of which was in counseling and coordination of care.     Again, thank you for allowing me to participate in the care of  your patient.      Sincerely,    Lisandro Aguiar MD

## 2018-07-10 NOTE — MR AVS SNAPSHOT
After Visit Summary   7/10/2018    Hoda Brush    MRN: [id number]           Patient Information     Date Of Birth          1955        Visit Information        Provider Department      7/10/2018 7:30 AM Wesley Iraheta; Lisandro Aguiar MD [facility]        Today's Diagnoses     Malignant neoplasm of left female breast, unspecified estrogen receptor status, unspecified site of breast (H)    -  1    Bone metastasis (H)        Persistent insomnia        Encounter for antineoplastic chemotherapy           Follow-ups after your visit        Follow-up notes from your care team     Return in about 12 weeks (around 10/2/2018).      Who to contact     If you have questions or need follow up information about today's clinic visit or your schedule please contact [facility] directly at [phone].  Normal or non-critical lab and imaging results will be communicated to you by Texxihart, letter or phone within 4 business days after the clinic has received the results. If you do not hear from us within 7 days, please contact the clinic through Texxi[employer] or phone. If you have a critical or abnormal lab result, we will notify you by phone as soon as possible.  Submit refill requests through [employer] or call your pharmacy and they will forward the refill request to us. Please allow 3 business days for your refill to be completed.          Additional Information About Your Visit        MyChart Information     [employer] gives you secure access to your electronic health record. If you see a primary care provider, you can also send messages to your care team and make appointments. If you have questions, please call your primary care clinic.  If you do not have a primary care provider, please call [phone] and they will assist you.        Care EveryWhere ID     This is your Care EveryWhere ID. This could be used by other organizations to access your [facility]  "medical records  [id number]        Your Vitals Were     Pulse Temperature Respirations Height Pulse Oximetry BMI (Body Mass Index)    72 98  F (36.7  C) (Oral) 16 1.575 m (5' 2\") 95% 32.74 kg/m2       Blood Pressure from Last 3 Encounters:   07/10/18 104/67   06/19/18 114/67   05/29/18 111/58    Weight from Last 3 Encounters:   07/10/18 81.2 kg (179 lb)   06/19/18 80.9 kg (178 lb 6.4 oz)   05/29/18 79.8 kg (176 lb)                 Today's Medication Changes          These changes are accurate as of 7/10/18 11:59 PM.  If you have any questions, ask your nurse or doctor.               Start taking these medicines.        Dose/Directions    mirtazapine 15 MG tablet   Commonly known as:  REMERON   Used for:  Persistent insomnia   Started by:  Lisandro Aguiar MD        Dose:  15 mg   Take 1 tablet (15 mg) by mouth At Bedtime   Quantity:  90 tablet   Refills:  3            Where to get your medicines      These medications were sent to [facility] [address] [place] [address] [address]  [address] [place] [address]     Phone:  [phone]     mirtazapine 15 MG tablet                Primary Care Provider Fax #    Physician No Ref-Primary [phone]       No address on file        Equal Access to Services     NOE VAZQUEZ AH: Hadii kamran portilloo Sosusanna, waaxda luqadaha, qaybta kaalmada adesarahyada, alexa angulo . So [facility] [phone].    ATENCIÓN: Si habla español, tiene a lagunas disposición servicios gratuitos de asistencia lingüística. Llame al [phone].    We comply with applicable federal civil rights laws and [place] laws. We do not discriminate on the basis of race, color, national origin, age, disability, sex, sexual orientation, or gender identity.            Thank you!     Thank you for choosing [facility]  for your care. Our goal is always to provide you with excellent care. Hearing back from our " patients is one way we can continue to improve our services. Please take a few minutes to complete the written survey that you may receive in the mail after your visit with us. Thank you!             Your Updated Medication List - Protect others around you: Learn how to safely use, store and throw away your medicines at www.disposemymeds.org.          This list is accurate as of 7/10/18 11:59 PM.  Always use your most recent med list.                   Brand Name Dispense Instructions for use Diagnosis    albuterol 108 (90 Base) MCG/ACT Inhaler    PROAIR HFA/PROVENTIL HFA/VENTOLIN HFA    1 Inhaler    Inhale 2 puffs into the lungs every 6 hours as needed for shortness of breath / dyspnea or wheezing    Cough       benzonatate 200 MG capsule    TESSALON    21 capsule    Take 1 capsule (200 mg) by mouth 3 times daily as needed for cough    Cough       guaiFENesin-codeine 100-10 MG/5ML Soln solution    ROBITUSSIN AC    240 mL    Take 5-10 mLs by mouth every 6 hours as needed for cough    Cough       mirtazapine 15 MG tablet    REMERON    90 tablet    Take 1 tablet (15 mg) by mouth At Bedtime    Persistent insomnia       order for DME     1 each    L UE class 2 compression sleeve and gauntlet Night garment Bandaging supplies    Lymphedema of left upper extremity       tamoxifen 20 MG tablet    NOLVADEX    90 tablet    Take 1 tablet (20 mg) by mouth daily    Cancer of central portion of left breast (H)       VITAMIN D (CHOLECALCIFEROL) PO      Take 1,000 Units by mouth daily

## 2018-07-10 NOTE — PROGRESS NOTES
Infusion Nursing Note:  Hoda Brush presents today for Cycle 15, day 1 Herceptin and Xgeva.    Patient seen by provider today: Yes: Dr. Aguiar  Pt presented to clinic with interpretor who remained present for entire infusion.    Note: Patient presents to clinic today feeling well with no questions.      Intravenous Access:  Implanted Port.    Treatment Conditions:  Lab Results   Component Value Date    HGB 11.6 07/10/2018     Lab Results   Component Value Date    WBC 6.0 07/10/2018      Lab Results   Component Value Date    ANEU 2.8 07/10/2018     Lab Results   Component Value Date     07/10/2018      Lab Results   Component Value Date     07/10/2018                   Lab Results   Component Value Date    POTASSIUM 4.0 07/10/2018           No results found for: MAG         Lab Results   Component Value Date    CR 0.75 07/10/2018                   Lab Results   Component Value Date    TAYLER 8.3 07/10/2018                Lab Results   Component Value Date    BILITOTAL 0.3 07/10/2018           Lab Results   Component Value Date    ALBUMIN 3.1 07/10/2018                    Lab Results   Component Value Date    ALT 20 07/10/2018           Lab Results   Component Value Date    AST 20 07/10/2018     Results reviewed, labs MET treatment parameters, ok to proceed with treatment.  ECHO/MUGA completed 6/19/2018 EF 60-65%    Post Infusion Assessment:  Patient tolerated infusion without incident.  Patient tolerated injection to LUE without incident.  Blood return noted pre and post infusion.  Site patent and intact, free from redness, edema or discomfort.  No evidence of extravasations.  Access discontinued per protocol.    Discharge Plan:   Patient declined prescription refills.  Discharge instructions reviewed with: Patient.  Patient and/or family verbalized understanding of discharge instructions and all questions answered.  Copy of AVS reviewed with patient and/or family.  Patient will return 7/31/2018  for next appointment--has yet to be scheduled 2/2 clinic availability, [facility].  Departure Mode: Ambulatory.    Steffi Oh RN

## 2018-07-10 NOTE — MR AVS SNAPSHOT
After Visit Summary   7/10/2018    Hoda Brush    MRN: [id number]           Patient Information     Date Of Birth          1955        Visit Information        Provider Department      7/10/2018 8:30 AM Wesley Iraheta;  28 ATC;  ONCOLOGY INFUSION  Services Department        Today's Diagnoses     Malignant neoplasm of left female breast, unspecified estrogen receptor status, unspecified site of breast (H)    -  1      Care Instructions    Contact Numbers    [facility] Main Line: [phone]  [facility] Triage:  [phone]    Call triage with chills and/or temperature greater than or equal to 100.5, uncontrolled nausea/vomiting, diarrhea, constipation, dizziness, shortness of breath, chest pain, bleeding, unexplained bruising, or any new/concerning symptoms, questions/concerns.     If you are having any concerning symptoms or wish to speak to a provider before your next infusion visit, please call your care coordinator or triage to notify them so we can adequately serve you.       After Hours: [phone]    If after hours, weekends, or holidays, call main hospital  and ask for Oncology doctor on call.         July 2018 Sunday Monday Tuesday Wednesday Thursday Friday Saturday   1     2     3     4     5     CT CHEST/ABDOMEN/PELVIS W    1:00 PM   (90 min.)   CT1   [facility] CT 6     7       8     9     10     [facility] LAB DRAW    7:00 AM   (15 min.)   [facility] LAB DRAW   [facility] Lab Draw     [facility] RETURN    7:30 AM   (75 min.)   Lisandro Aguiar MD   [facility] ONC INFUSION 60    8:30 AM   (90 min.)    ONCOLOGY INFUSION   [facility] 11     12     13     14       15     16     17     18     19     20     21       22     23     24     25     26     27     28       29     30     31 August 2018 Sunday Monday Tuesday Wednesday Thursday Friday Saturday                   1     2     3     4       5     6     7     8     9     10     11       12     13     14     15     16     17     18       19     20     21     22     23     24     25       26     27     28     29     30     31                       Lab Results:  Recent Results (from the past 12 hour(s))   CBC with platelets differential    Collection Time: 07/10/18  7:44 AM   Result Value Ref Range    WBC 6.0 4.0 - 11.0 10e9/L    RBC Count 3.86 3.8 - 5.2 10e12/L    Hemoglobin 11.6 (L) 11.7 - 15.7 g/dL    Hematocrit 37.6 35.0 - 47.0 %    MCV 97 78 - 100 fl    MCH 30.1 26.5 - 33.0 pg    MCHC 30.9 (L) 31.5 - 36.5 g/dL    RDW 13.2 10.0 - 15.0 %    Platelet Count 188 150 - 450 10e9/L    Diff Method Automated Method     % Neutrophils 46.7 %    % Lymphocytes 42.2 %    % Monocytes 6.3 %    % Eosinophils 4.0 %    % Basophils 0.5 %    % Immature Granulocytes 0.3 %    Nucleated RBCs 0 0 /100    Absolute Neutrophil 2.8 1.6 - 8.3 10e9/L    Absolute Lymphocytes 2.5 0.8 - 5.3 10e9/L    Absolute Monocytes 0.4 0.0 - 1.3 10e9/L    Absolute Eosinophils 0.2 0.0 - 0.7 10e9/L    Absolute Basophils 0.0 0.0 - 0.2 10e9/L    Abs Immature Granulocytes 0.0 0 - 0.4 10e9/L    Absolute Nucleated RBC 0.0    Comprehensive metabolic panel    Collection Time: 07/10/18  7:44 AM   Result Value Ref Range    Sodium 143 133 - 144 mmol/L    Potassium 4.0 3.4 - 5.3 mmol/L    Chloride 107 94 - 109 mmol/L    Carbon Dioxide 27 20 - 32 mmol/L    Anion Gap 9 3 - 14 mmol/L    Glucose 84 70 - 99 mg/dL    Urea Nitrogen 13 7 - 30 mg/dL    Creatinine 0.75 0.52 - 1.04 mg/dL    GFR Estimate 78 >60 mL/min/1.7m2    GFR Estimate If Black >90 >60 mL/min/1.7m2    Calcium 8.3 (L) 8.5 - 10.1 mg/dL    Bilirubin Total 0.3 0.2 - 1.3 mg/dL    Albumin 3.1 (L) 3.4 - 5.0 g/dL    Protein Total 6.9 6.8 - 8.8 g/dL    Alkaline Phosphatase 34 (L) 40 - 150 U/L    ALT 20 0 - 50 U/L    AST 20 0 - 45 U/L               Follow-ups after your visit        Future tests that were ordered for you today      Open Standing Orders        Priority Remaining Interval Expires Ordered    Ca27.29  breast tumor marker Routine 52/52  10/7/2018 7/9/2018    CBC with platelets differential Routine 52/52 7/9/2019 7/9/2018    CEA Routine 52/52  10/7/2018 7/9/2018    Comprehensive metabolic panel Routine 52/52 7/9/2019 7/9/2018          Open Future Orders        Priority Expected Expires Ordered    Echocardiogram Complete Routine  7/10/2019 7/10/2018    CT Chest/Abdomen/Pelvis w Contrast Routine  2/4/2019 7/9/2018            Who to contact     If you have questions or need follow up information about today's clinic visit or your schedule please contact [facility] directly at [phone].  Normal or non-critical lab and imaging results will be communicated to you by WiChorushart, letter or phone within 4 business days after the clinic has received the results. If you do not hear from us within 7 days, please contact the clinic through WiChorushart or phone. If you have a critical or abnormal lab result, we will notify you by phone as soon as possible.  Submit refill requests through AFreeze or call your pharmacy and they will forward the refill request to us. Please allow 3 business days for your refill to be completed.          Additional Information About Your Visit        AFreeze Information     AFreeze gives you secure access to your electronic health record. If you see a primary care provider, you can also send messages to your care team and make appointments. If you have questions, please call your primary care clinic.  If you do not have a primary care provider, please call [phone] and they will assist you.        Care EveryWhere ID     This is your Care EveryWhere ID. This could be used by other organizations to access your [place] medical records  [id number]         Blood Pressure from Last 3 Encounters:   07/10/18 104/67   06/19/18 114/67   05/29/18 111/58    Weight from Last 3 Encounters:   07/10/18  81.2 kg (179 lb)   06/19/18 80.9 kg (178 lb 6.4 oz)   05/29/18 79.8 kg (176 lb)              We Performed the Following     CA 27.29 Breast tumor marker     CBC with platelets differential     CEA     Comprehensive metabolic panel          Today's Medication Changes          These changes are accurate as of 7/10/18  9:45 AM.  If you have any questions, ask your nurse or doctor.               Start taking these medicines.        Dose/Directions    mirtazapine 15 MG tablet   Commonly known as:  REMERON   Used for:  Persistent insomnia   Started by:  Lisandro Aguiar MD        Dose:  15 mg   Take 1 tablet (15 mg) by mouth At Bedtime   Quantity:  90 tablet   Refills:  3            Where to get your medicines      These medications were sent to [facility] [address] [place] [address] [address] [facility]  [address] [place] [address]     Phone:  [phone]     mirtazapine 15 MG tablet                Primary Care Provider Fax #    Physician No Ref-Primary [phone]       No address on file        Equal Access to Services     [facility]: Hadii kamran nayak Sosusanna, waaxda luqadaha, qaybta kaalmateo wilkins, alexa angulo . So [facility] [place] [phone].    ATENCIÓN: Si habla español, tiene a lagunas disposición servicios gratuitos de asistencia lingüística. Llame al [phone].    We comply with applicable federal civil rights laws and [place] laws. We do not discriminate on the basis of race, color, national origin, age, disability, sex, sexual orientation, or gender identity.            Thank you!     Thank you for choosing [facility]  for your care. Our goal is always to provide you with excellent care. Hearing back from our patients is one way we can continue to improve our services. Please take a few minutes to complete the written survey that you may receive in the mail after your visit with us. Thank you!              Your Updated Medication List - Protect others around you: Learn how to safely use, store and throw away your medicines at www.disposemymeds.org.          This list is accurate as of 7/10/18  9:45 AM.  Always use your most recent med list.                   Brand Name Dispense Instructions for use Diagnosis    albuterol 108 (90 Base) MCG/ACT Inhaler    PROAIR HFA/PROVENTIL HFA/VENTOLIN HFA    1 Inhaler    Inhale 2 puffs into the lungs every 6 hours as needed for shortness of breath / dyspnea or wheezing    Cough       benzonatate 200 MG capsule    TESSALON    21 capsule    Take 1 capsule (200 mg) by mouth 3 times daily as needed for cough    Cough       guaiFENesin-codeine 100-10 MG/5ML Soln solution    ROBITUSSIN AC    240 mL    Take 5-10 mLs by mouth every 6 hours as needed for cough    Cough       mirtazapine 15 MG tablet    REMERON    90 tablet    Take 1 tablet (15 mg) by mouth At Bedtime    Persistent insomnia       order for DME     1 each    L UE class 2 compression sleeve and gauntlet Night garment Bandaging supplies    Lymphedema of left upper extremity       tamoxifen 20 MG tablet    NOLVADEX    90 tablet    Take 1 tablet (20 mg) by mouth daily    Cancer of central portion of left breast (H)       VITAMIN D (CHOLECALCIFEROL) PO      Take 1,000 Units by mouth daily

## 2018-07-10 NOTE — PATIENT INSTRUCTIONS
Contact Numbers    [facility] Main Line: [phone]  [facility] Triage:  [phone]    Call triage with chills and/or temperature greater than or equal to 100.5, uncontrolled nausea/vomiting, diarrhea, constipation, dizziness, shortness of breath, chest pain, bleeding, unexplained bruising, or any new/concerning symptoms, questions/concerns.     If you are having any concerning symptoms or wish to speak to a provider before your next infusion visit, please call your care coordinator or triage to notify them so we can adequately serve you.       After Hours: [phone]    If after hours, weekends, or holidays, call main hospital  and ask for Oncology doctor on call.         July 2018 Sunday Monday Tuesday Wednesday Thursday Friday Saturday   1     2     3     4     5     CT CHEST/ABDOMEN/PELVIS W    1:00 PM   (90 min.)   UC[facility]1   [facility] CT 6     7       8     9     10     [facility] DRAW    7:00 AM   (15 min.)   [facility]   [facility] Lab Draw     [facility] RETURN    7:30 AM   (75 min.)   Lisandro Aguiar MD   [facility] ONC INFUSION 60    8:30 AM   (90 min.)    ONCOLOGY INFUSION   [facility] 11     12     13     14       15     16     17     18     19     20     21       22     23     24     25     26     27     28       29     30     31                                    August 2018 Sunday Monday Tuesday Wednesday Thursday Friday Saturday                  1     2     3     4       5     6     7     8     9     10     11       12     13     14     15     16     17     18       19     20     21     22     23     24     25       26     27     28     29     30     31                       Lab Results:  Recent Results (from the past 12 hour(s))   CBC with platelets differential    Collection Time: 07/10/18  7:44 AM   Result Value Ref Range    WBC 6.0 4.0 - 11.0 10e9/L    RBC Count 3.86 3.8 - 5.2 10e12/L    Hemoglobin 11.6 (L)  11.7 - 15.7 g/dL    Hematocrit 37.6 35.0 - 47.0 %    MCV 97 78 - 100 fl    MCH 30.1 26.5 - 33.0 pg    MCHC 30.9 (L) 31.5 - 36.5 g/dL    RDW 13.2 10.0 - 15.0 %    Platelet Count 188 150 - 450 10e9/L    Diff Method Automated Method     % Neutrophils 46.7 %    % Lymphocytes 42.2 %    % Monocytes 6.3 %    % Eosinophils 4.0 %    % Basophils 0.5 %    % Immature Granulocytes 0.3 %    Nucleated RBCs 0 0 /100    Absolute Neutrophil 2.8 1.6 - 8.3 10e9/L    Absolute Lymphocytes 2.5 0.8 - 5.3 10e9/L    Absolute Monocytes 0.4 0.0 - 1.3 10e9/L    Absolute Eosinophils 0.2 0.0 - 0.7 10e9/L    Absolute Basophils 0.0 0.0 - 0.2 10e9/L    Abs Immature Granulocytes 0.0 0 - 0.4 10e9/L    Absolute Nucleated RBC 0.0    Comprehensive metabolic panel    Collection Time: 07/10/18  7:44 AM   Result Value Ref Range    Sodium 143 133 - 144 mmol/L    Potassium 4.0 3.4 - 5.3 mmol/L    Chloride 107 94 - 109 mmol/L    Carbon Dioxide 27 20 - 32 mmol/L    Anion Gap 9 3 - 14 mmol/L    Glucose 84 70 - 99 mg/dL    Urea Nitrogen 13 7 - 30 mg/dL    Creatinine 0.75 0.52 - 1.04 mg/dL    GFR Estimate 78 >60 mL/min/1.7m2    GFR Estimate If Black >90 >60 mL/min/1.7m2    Calcium 8.3 (L) 8.5 - 10.1 mg/dL    Bilirubin Total 0.3 0.2 - 1.3 mg/dL    Albumin 3.1 (L) 3.4 - 5.0 g/dL    Protein Total 6.9 6.8 - 8.8 g/dL    Alkaline Phosphatase 34 (L) 40 - 150 U/L    ALT 20 0 - 50 U/L    AST 20 0 - 45 U/L

## 2018-07-30 RX ORDER — ALBUTEROL SULFATE 0.83 MG/ML
2.5 SOLUTION RESPIRATORY (INHALATION)
Status: CANCELLED | OUTPATIENT
Start: 2018-08-01

## 2018-07-30 RX ORDER — DIPHENHYDRAMINE HCL 25 MG
50 CAPSULE ORAL ONCE
Status: CANCELLED
Start: 2018-08-01

## 2018-07-30 RX ORDER — LORAZEPAM 2 MG/ML
0.5 INJECTION INTRAMUSCULAR EVERY 4 HOURS PRN
Status: CANCELLED
Start: 2018-08-01

## 2018-07-30 RX ORDER — HEPARIN SODIUM (PORCINE) LOCK FLUSH IV SOLN 100 UNIT/ML 100 UNIT/ML
5 SOLUTION INTRAVENOUS EVERY 8 HOURS
Status: CANCELLED | OUTPATIENT
Start: 2018-08-01

## 2018-07-30 RX ORDER — EPINEPHRINE 0.3 MG/.3ML
0.3 INJECTION SUBCUTANEOUS EVERY 5 MIN PRN
Status: CANCELLED | OUTPATIENT
Start: 2018-08-01

## 2018-07-30 RX ORDER — SODIUM CHLORIDE 9 MG/ML
1000 INJECTION, SOLUTION INTRAVENOUS CONTINUOUS PRN
Status: CANCELLED
Start: 2018-08-01

## 2018-07-30 RX ORDER — EPINEPHRINE 1 MG/ML
0.3 INJECTION, SOLUTION INTRAMUSCULAR; SUBCUTANEOUS EVERY 5 MIN PRN
Status: CANCELLED | OUTPATIENT
Start: 2018-08-01

## 2018-07-30 RX ORDER — METHYLPREDNISOLONE SODIUM SUCCINATE 125 MG/2ML
125 INJECTION, POWDER, LYOPHILIZED, FOR SOLUTION INTRAMUSCULAR; INTRAVENOUS
Status: CANCELLED
Start: 2018-08-01

## 2018-07-30 RX ORDER — ALBUTEROL SULFATE 90 UG/1
1-2 AEROSOL, METERED RESPIRATORY (INHALATION)
Status: CANCELLED
Start: 2018-08-01

## 2018-07-30 RX ORDER — DIPHENHYDRAMINE HYDROCHLORIDE 50 MG/ML
50 INJECTION INTRAMUSCULAR; INTRAVENOUS
Status: CANCELLED
Start: 2018-08-01

## 2018-07-30 RX ORDER — MEPERIDINE HYDROCHLORIDE 25 MG/ML
25 INJECTION INTRAMUSCULAR; INTRAVENOUS; SUBCUTANEOUS EVERY 30 MIN PRN
Status: CANCELLED | OUTPATIENT
Start: 2018-08-01

## 2018-07-30 RX ORDER — ACETAMINOPHEN 325 MG/1
650 TABLET ORAL
Status: CANCELLED | OUTPATIENT
Start: 2018-08-01

## 2018-08-01 ENCOUNTER — INFUSION THERAPY VISIT (OUTPATIENT)
Dept: ONCOLOGY | Facility: CLINIC | Age: 63
End: 2018-08-01
Attending: INTERNAL MEDICINE
Payer: COMMERCIAL

## 2018-08-01 ENCOUNTER — APPOINTMENT (OUTPATIENT)
Dept: LAB | Facility: CLINIC | Age: 63
End: 2018-08-01
Attending: INTERNAL MEDICINE
Payer: COMMERCIAL

## 2018-08-01 VITALS
HEART RATE: 87 BPM | BODY MASS INDEX: 33.2 KG/M2 | WEIGHT: 181.5 LBS | TEMPERATURE: 98.1 F | OXYGEN SATURATION: 96 % | DIASTOLIC BLOOD PRESSURE: 78 MMHG | RESPIRATION RATE: 18 BRPM | SYSTOLIC BLOOD PRESSURE: 125 MMHG

## 2018-08-01 DIAGNOSIS — C50.912 MALIGNANT NEOPLASM OF LEFT FEMALE BREAST, UNSPECIFIED ESTROGEN RECEPTOR STATUS, UNSPECIFIED SITE OF BREAST (H): Primary | ICD-10-CM

## 2018-08-01 LAB
ALBUMIN SERPL-MCNC: 3.2 G/DL (ref 3.4–5)
ALP SERPL-CCNC: 45 U/L (ref 40–150)
ALT SERPL W P-5'-P-CCNC: 25 U/L (ref 0–50)
ANION GAP SERPL CALCULATED.3IONS-SCNC: 7 MMOL/L (ref 3–14)
AST SERPL W P-5'-P-CCNC: 24 U/L (ref 0–45)
BASOPHILS # BLD AUTO: 0 10E9/L (ref 0–0.2)
BASOPHILS NFR BLD AUTO: 0.4 %
BILIRUB SERPL-MCNC: 0.2 MG/DL (ref 0.2–1.3)
BUN SERPL-MCNC: 22 MG/DL (ref 7–30)
CALCIUM SERPL-MCNC: 8.4 MG/DL (ref 8.5–10.1)
CANCER AG27-29 SERPL-ACNC: <5 U/ML (ref 0–39)
CEA SERPL-MCNC: 0.8 UG/L (ref 0–2.5)
CHLORIDE SERPL-SCNC: 110 MMOL/L (ref 94–109)
CO2 SERPL-SCNC: 26 MMOL/L (ref 20–32)
CREAT SERPL-MCNC: 1.12 MG/DL (ref 0.52–1.04)
DIFFERENTIAL METHOD BLD: NORMAL
EOSINOPHIL # BLD AUTO: 0.3 10E9/L (ref 0–0.7)
EOSINOPHIL NFR BLD AUTO: 3.4 %
ERYTHROCYTE [DISTWIDTH] IN BLOOD BY AUTOMATED COUNT: 13.2 % (ref 10–15)
GFR SERPL CREATININE-BSD FRML MDRD: 49 ML/MIN/1.7M2
GLUCOSE SERPL-MCNC: 109 MG/DL (ref 70–99)
HCT VFR BLD AUTO: 38.1 % (ref 35–47)
HGB BLD-MCNC: 12.1 G/DL (ref 11.7–15.7)
IMM GRANULOCYTES # BLD: 0 10E9/L (ref 0–0.4)
IMM GRANULOCYTES NFR BLD: 0.1 %
LYMPHOCYTES # BLD AUTO: 2.9 10E9/L (ref 0.8–5.3)
LYMPHOCYTES NFR BLD AUTO: 40.1 %
MCH RBC QN AUTO: 30.7 PG (ref 26.5–33)
MCHC RBC AUTO-ENTMCNC: 31.8 G/DL (ref 31.5–36.5)
MCV RBC AUTO: 97 FL (ref 78–100)
MONOCYTES # BLD AUTO: 0.5 10E9/L (ref 0–1.3)
MONOCYTES NFR BLD AUTO: 6.6 %
NEUTROPHILS # BLD AUTO: 3.6 10E9/L (ref 1.6–8.3)
NEUTROPHILS NFR BLD AUTO: 49.4 %
NRBC # BLD AUTO: 0 10*3/UL
NRBC BLD AUTO-RTO: 0 /100
PLATELET # BLD AUTO: 204 10E9/L (ref 150–450)
POTASSIUM SERPL-SCNC: 4.1 MMOL/L (ref 3.4–5.3)
PROT SERPL-MCNC: 7.4 G/DL (ref 6.8–8.8)
RBC # BLD AUTO: 3.94 10E12/L (ref 3.8–5.2)
SODIUM SERPL-SCNC: 143 MMOL/L (ref 133–144)
WBC # BLD AUTO: 7.3 10E9/L (ref 4–11)

## 2018-08-01 PROCEDURE — 80053 COMPREHEN METABOLIC PANEL: CPT | Performed by: INTERNAL MEDICINE

## 2018-08-01 PROCEDURE — 25000128 H RX IP 250 OP 636: Mod: ZF | Performed by: INTERNAL MEDICINE

## 2018-08-01 PROCEDURE — 86300 IMMUNOASSAY TUMOR CA 15-3: CPT | Performed by: INTERNAL MEDICINE

## 2018-08-01 PROCEDURE — 25000128 H RX IP 250 OP 636: Mod: ZF | Performed by: PHYSICIAN ASSISTANT

## 2018-08-01 PROCEDURE — 85025 COMPLETE CBC W/AUTO DIFF WBC: CPT | Performed by: INTERNAL MEDICINE

## 2018-08-01 PROCEDURE — 96413 CHEMO IV INFUSION 1 HR: CPT

## 2018-08-01 PROCEDURE — 82378 CARCINOEMBRYONIC ANTIGEN: CPT | Performed by: INTERNAL MEDICINE

## 2018-08-01 RX ORDER — HEPARIN SODIUM (PORCINE) LOCK FLUSH IV SOLN 100 UNIT/ML 100 UNIT/ML
5 SOLUTION INTRAVENOUS EVERY 8 HOURS
Status: DISCONTINUED | OUTPATIENT
Start: 2018-08-01 | End: 2018-08-01 | Stop reason: HOSPADM

## 2018-08-01 RX ORDER — HEPARIN SODIUM (PORCINE) LOCK FLUSH IV SOLN 100 UNIT/ML 100 UNIT/ML
5 SOLUTION INTRAVENOUS EVERY 8 HOURS PRN
Status: DISCONTINUED | OUTPATIENT
Start: 2018-08-01 | End: 2018-08-01 | Stop reason: HOSPADM

## 2018-08-01 RX ADMIN — HEPARIN 5 ML: 100 SYRINGE at 17:04

## 2018-08-01 RX ADMIN — SODIUM CHLORIDE, PRESERVATIVE FREE 5 ML: 5 INJECTION INTRAVENOUS at 14:33

## 2018-08-01 RX ADMIN — TRASTUZUMAB 450 MG: 150 INJECTION, POWDER, LYOPHILIZED, FOR SOLUTION INTRAVENOUS at 16:07

## 2018-08-01 RX ADMIN — SODIUM CHLORIDE 1000 ML: 9 INJECTION, SOLUTION INTRAVENOUS at 15:52

## 2018-08-01 ASSESSMENT — PAIN SCALES - GENERAL: PAINLEVEL: NO PAIN (0)

## 2018-08-01 NOTE — MR AVS SNAPSHOT
After Visit Summary   8/1/2018    Hoda Brush    MRN: [id number]           Patient Information     Date Of Birth          1955        Visit Information        Provider Department      8/1/2018 3:00 PM [place] [employer];  26 ATC;  ONCOLOGY INFUSION [facility]        Today's Diagnoses     Malignant neoplasm of left female breast, unspecified estrogen receptor status, unspecified site of breast (H)    -  1      Care Instructions    Contact Numbers    [facility] Main Line: [phone]  [facility] Triage and after hours / weekends / holidays:  [phone]      Please call the triage or after hours line if you experience a temperature greater than or equal to 100.5, shaking chills, have uncontrolled nausea, vomiting and/or diarrhea, dizziness, shortness of breath, chest pain, bleeding, unexplained bruising, or if you have any other new/concerning symptoms, questions or concerns.      If you are having any concerning symptoms or wish to speak to a provider before your next infusion visit, please call your care coordinator or triage to notify them so we can adequately serve you.     If you need a refill on a narcotic prescription or other medication, please call before your infusion appointment.           August 2018 Sunday Monday Tuesday Wednesday Thursday Friday Saturday 1     [facility] DRAW    2:30 PM   (30 min.)   [facility]   [facility] Lab Draw     [facility] ONC INFUSION 60    3:00 PM   (120 min.)    ONCOLOGY INFUSION   [facility] 2     3     4       5     6     7     8     9     10     11       12     13     14     15     16     17     18       19     20     21     [facility] LAB DRAW    7:30 AM   (15 min.)    [facility] LAB DRAW   [facility] Lab Draw     [facility] RETURN    7:45 AM   (50 min.)   Jossie Sparrow PA   [facility]     [facility] ONC INFUSION 60    8:30 AM   (60 min.)    ONCOLOGY  INFUSION   [facility] 22     23     24     25       26     27     28     29     30     31 September 2018 Sunday Monday Tuesday Wednesday Thursday Friday Saturday                                 1       2     3     4     5     6     7     8       9     10     11     [facility] LAB DRAW    8:30 AM   (15 min.)    [facility] LAB DRAW   [facility] Lab Draw     [facility] RETURN    8:45 AM   (30 min.)   Lisandro Aguiar MD   [facility] 12     13     14     15       16     17     18     19     20     21     22       23     24     25     26     27     28     29       30                                                Lab Results:  Recent Results (from the past 12 hour(s))   CBC with platelets differential    Collection Time: 08/01/18  2:38 PM   Result Value Ref Range    WBC 7.3 4.0 - 11.0 10e9/L    RBC Count 3.94 3.8 - 5.2 10e12/L    Hemoglobin 12.1 11.7 - 15.7 g/dL    Hematocrit 38.1 35.0 - 47.0 %    MCV 97 78 - 100 fl    MCH 30.7 26.5 - 33.0 pg    MCHC 31.8 31.5 - 36.5 g/dL    RDW 13.2 10.0 - 15.0 %    Platelet Count 204 150 - 450 10e9/L    Diff Method Automated Method     % Neutrophils 49.4 %    % Lymphocytes 40.1 %    % Monocytes 6.6 %    % Eosinophils 3.4 %    % Basophils 0.4 %    % Immature Granulocytes 0.1 %    Nucleated RBCs 0 0 /100    Absolute Neutrophil 3.6 1.6 - 8.3 10e9/L    Absolute Lymphocytes 2.9 0.8 - 5.3 10e9/L    Absolute Monocytes 0.5 0.0 - 1.3 10e9/L    Absolute Eosinophils 0.3 0.0 - 0.7 10e9/L    Absolute Basophils 0.0 0.0 - 0.2 10e9/L    Abs Immature Granulocytes 0.0 0 - 0.4 10e9/L    Absolute Nucleated RBC 0.0    Comprehensive metabolic panel    Collection Time: 08/01/18  2:38 PM   Result Value Ref Range    Sodium 143 133 - 144 mmol/L    Potassium 4.1 3.4 - 5.3 mmol/L    Chloride 110 (H) 94 - 109 mmol/L    Carbon Dioxide 26 20 - 32 mmol/L    Anion Gap 7 3 - 14 mmol/L    Glucose 109 (H) 70 - 99 mg/dL    Urea Nitrogen 22 7 - 30 mg/dL     Creatinine 1.12 (H) 0.52 - 1.04 mg/dL    GFR Estimate 49 (L) >60 mL/min/1.7m2    GFR Estimate If Black 60 (L) >60 mL/min/1.7m2    Calcium 8.4 (L) 8.5 - 10.1 mg/dL    Bilirubin Total 0.2 0.2 - 1.3 mg/dL    Albumin 3.2 (L) 3.4 - 5.0 g/dL    Protein Total 7.4 6.8 - 8.8 g/dL    Alkaline Phosphatase 45 40 - 150 U/L    ALT 25 0 - 50 U/L    AST 24 0 - 45 U/L               Follow-ups after your visit        Your next 10 appointments already scheduled     Aug 21, 2018  7:30 AM CDT   [facility] Draw with  [facility] DRAW   [facility]    [address] [place] [address]  [address]  [facility] [address]   [phone]            Aug 21, 2018  8:00 AM CDT   (Arrive by 7:45 AM)   Return Visit with SEMAJ Eddy   [facility] ([facility])    [address] [place] [address]  [address]  [facility] [address]   [phone]            Aug 21, 2018  8:30 AM CDT   Infusion 60 with UC ONCOLOGY INFUSION, UC 27 ATC   [facility] ([facility])    [address] [place] [address]  [address]  [facility] [address]   [phone]            Sep 11, 2018  8:30 AM CDT   [facility] Draw with UC [facility] LAB DRAW   [facility] Draw ([facility])    [address] [place] [address]  [address]  [facility] [address]   [phone]            Sep 11, 2018  9:00 AM CDT   (Arrive by 8:45 AM)   Return Visit with Lisandro Aguiar MD   [facility] ([facility])    [address] [place] [address]  [address]  [facility] [address]   [phone]            Oct 01, 2018  9:40 AM CDT   CT CHEST/ABDOMEN/PELVIS W CONTRAST with UCCT2   [facility] [place] [facility] ([facility])    [address] [place] [address]  [facility] [address] [phone]           Please bring any scans or X-rays taken at other hospitals, if similar tests were  done. Also bring a list of your medicines, including vitamins, minerals and over-the-counter drugs. It is safest to leave personal items at home.  Be sure to tell your doctor:   If you have any allergies.   If there s any chance you are pregnant.   If you are breastfeeding.  How to prepare:   Do not eat or drink for 2 hours before your exam. If you need to take medicine, you may take it with small sips of water. (We may ask you to take liquid medicine as well.)   Please wear loose clothing, such as a sweat suit or jogging clothes. Avoid snaps, zippers and other metal. We may ask you to undress and put on a hospital gown.  Please arrive 30 minutes early for your CT. Once in the department you might be asked to drink water 15-20 minutes prior to your exam.  If indicated you may be asked to drink an oral contrast in advance of your CT.  If this is the case, the imaging team will let you know or be in contact with you prior to your appointment  Patients over 70 or patients with diabetes or kidney problems:   If you haven t had a blood test (creatinine test) within the last 30 days, the Cardiologist/Radiologist may require you to get this test prior to your exam.  If you have diabetes:   Continue to take your metformin medication on the day of your exam  If you have any questions, please call the Imaging Department where you will have your exam.            Oct 02, 2018  9:00 AM CDT   Ech Complete with UCECHCR4   [facility] ([facility])    [address] [place] [address] [phone]           1.  Please bring or wear a comfortable two-piece outfit. 2.  You may eat, drink and take your normal medicines. 3.  For any questions that cannot be answered, please contact the ordering physician 4.  Please do not wear perfumes or scented lotions on the day of your exam.            Oct 02, 2018 10:00 AM CDT   [facility] Lab Draw with  [facility] DRAW   [facility] ([facility])    [address]  [address]  [facility] [id number] [phone]            Oct 02, 2018 10:30 AM CDT   (Arrive by 10:15 AM)   Return Visit with SEMAJ Eddy   [facility] ([facility])    [address]  [address]  [facility] [id number] [phone]              Who to contact     If you have questions or need follow up information about today's clinic visit or your schedule please contact [facility]ER [facility] directly at [phone].  Normal or non-critical lab and imaging results will be communicated to you by MyChart, letter or phone within 4 business days after the clinic has received the results. If you do not hear from us within 7 days, please contact the clinic through [employer] or phone. If you have a critical or abnormal lab result, we will notify you by phone as soon as possible.  Submit refill requests through [employer] or call your pharmacy and they will forward the refill request to us. Please allow 3 business days for your refill to be completed.          Additional Information About Your Visit        Backchannel[employer]Judys Book Information     [employer] gives you secure access to your electronic health record. If you see a primary care provider, you can also send messages to your care team and make appointments. If you have questions, please call your primary care clinic.  If you do not have a primary care provider, please call [phone] and they will assist you.        Care EveryWhere ID     This is your Care EveryWhere ID. This could be used by other organizations to access your [place] medical records  [id number]        Your Vitals Were     Pulse Temperature Respirations Pulse Oximetry BMI (Body Mass Index)       87 98.1  F (36.7  C) (Oral) 18 96% 33.2 kg/m2        Blood Pressure from Last 3 Encounters:   08/01/18 125/78   07/10/18 104/67   06/19/18 114/67    Weight from Last 3 Encounters:    08/01/18 82.3 kg (181 lb 8 oz)   07/10/18 81.2 kg (179 lb)   06/19/18 80.9 kg (178 lb 6.4 oz)              We Performed the Following     CA 27.29 Breast tumor marker     CBC with platelets differential     CEA     Comprehensive metabolic panel        Primary Care Provider Fax #    Physician No Ref-Primary [phone]       No address on file        Equal Access to Services     TONEY TAYLOR : Hadii aad ku hadasho Soomaali, waaxda luqadaha, qaybta kaalmada adesarahyada, waxrd levyin jesusn conchasarah reeder laquocnba . So [facility] [phone].    ATENCIÓN: Si habla español, tiene a lagunas disposición servicios gratuitos de asistencia lingüística. Llame al [phone].    We comply with applicable federal civil rights laws and [place] laws. We do not discriminate on the basis of race, color, national origin, age, disability, sex, sexual orientation, or gender identity.            Thank you!     Thank you for choosing [facility] CL[facility]  for your care. Our goal is always to provide you with excellent care. Hearing back from our patients is one way we can continue to improve our services. Please take a few minutes to complete the written survey that you may receive in the mail after your visit with us. Thank you!             Your Updated Medication List - Protect others around you: Learn how to safely use, store and throw away your medicines at www.disposemymeds.org.          This list is accurate as of 8/1/18  5:32 PM.  Always use your most recent med list.                   Brand Name Dispense Instructions for use Diagnosis    albuterol 108 (90 Base) MCG/ACT Inhaler    PROAIR HFA/PROVENTIL HFA/VENTOLIN HFA    1 Inhaler    Inhale 2 puffs into the lungs every 6 hours as needed for shortness of breath / dyspnea or wheezing    Cough       benzonatate 200 MG capsule    TESSALON    21 capsule    Take 1 capsule (200 mg) by mouth 3 times daily as needed for cough    Cough       guaiFENesin-codeine 100-10 MG/5ML Soln solution     ROBITUSSIN AC    240 mL    Take 5-10 mLs by mouth every 6 hours as needed for cough    Cough       mirtazapine 15 MG tablet    REMERON    90 tablet    Take 1 tablet (15 mg) by mouth At Bedtime    Persistent insomnia       order for DME     1 each    L UE class 2 compression sleeve and gauntlet Night garment Bandaging supplies    Lymphedema of left upper extremity       tamoxifen 20 MG tablet    NOLVADEX    90 tablet    Take 1 tablet (20 mg) by mouth daily    Cancer of central portion of left breast (H)       VITAMIN D (CHOLECALCIFEROL) PO      Take 1,000 Units by mouth daily

## 2018-08-01 NOTE — PROGRESS NOTES
Infusion Nursing Note:  Hoda Brush presents today for cycle 16, day 1 Herceptin.    Patient seen by provider today: No   present during visit today: Yes, Language: Martiniquais.     Note: Patient arrived to infusion center with complaints of some fatigue. Denies complaints of fever, chills, pain, dyspnea, dizziness, nausea, weakness, diarrhea, constipation, dysuria, mouth sores, and abdominal cramping.     Intravenous Access:  Implanted Port.    Treatment Conditions:  Lab Results   Component Value Date    HGB 12.1 08/01/2018     Lab Results   Component Value Date    WBC 7.3 08/01/2018      Lab Results   Component Value Date    ANEU 3.6 08/01/2018     Lab Results   Component Value Date     08/01/2018      Lab Results   Component Value Date     08/01/2018                   Lab Results   Component Value Date    POTASSIUM 4.1 08/01/2018             Lab Results   Component Value Date    CR 1.12 08/01/2018                   Lab Results   Component Value Date    TAYLER 8.4 08/01/2018                Lab Results   Component Value Date    BILITOTAL 0.2 08/01/2018           Lab Results   Component Value Date    ALBUMIN 3.2 08/01/2018                    Lab Results   Component Value Date    ALT 25 08/01/2018           Lab Results   Component Value Date    AST 24 08/01/2018     Results reviewed, labs MET treatment parameters, ok to proceed with treatment.    Post Infusion Assessment:  Patient tolerated infusion without incident.  Blood return noted pre and post infusion.  Site patent and intact, free from redness, edema or discomfort.  No evidence of extravasations.  Access discontinued per protocol.    Discharge Plan:   Patient declined prescription refills.  Discharge instructions reviewed with: Patient.  Patient and/or family verbalized understanding of discharge instructions and all questions answered.  AVS to patient via ensembli.  Patient will return 8/21/2018 for next appointment.   Patient  discharged in stable condition accompanied by: .  Departure Mode: Ambulatory.    Gulshan Esquivel RN

## 2018-08-01 NOTE — PROGRESS NOTES
TORVALE Diamond RN / Jossie CHA @ [id number]  1 liter IV NS for elevated creatinine of 1.12  Marry Diamond RN

## 2018-08-01 NOTE — PATIENT INSTRUCTIONS
Contact Numbers    [facility] Main Line: [phone]  [facility] Triage and after hours / weekends / holidays:  [phone]      Please call the triage or after hours line if you experience a temperature greater than or equal to 100.5, shaking chills, have uncontrolled nausea, vomiting and/or diarrhea, dizziness, shortness of breath, chest pain, bleeding, unexplained bruising, or if you have any other new/concerning symptoms, questions or concerns.      If you are having any concerning symptoms or wish to speak to a provider before your next infusion visit, please call your care coordinator or triage to notify them so we can adequately serve you.     If you need a refill on a narcotic prescription or other medication, please call before your infusion appointment.           August 2018 Sunday Monday Tuesday Wednesday Thursday Friday Saturday                  1     [facility] D[facility]    2:30 PM   (30 min.)    [facility] LAB DRAW   [facility] Lab Draw     [facility] ONC INFUSION 60    3:00 PM   (120 min.)    ONCOLOGY INFUSION   [facility] 2     3     4       5     6     7     8     9     10     11       12     13     14     15     16     17     18       19     20     21     [facility] DRAW    7:30 AM   (15 min.)    [facility] LAB DRAW   [facility] Lab Draw     [facility] RETURN    7:45 AM   (50 min.)   Jossie Sparrow PA   [facility]     [facility] ONC INFUSION 60    8:30 AM   (60 min.)    ONCOLOGY INFUSION   [facility] 22     23     24     25       26     27     28     29     30     31                     September 2018 Sunday Monday Tuesday Wednesday Thursday Friday Saturday                                 1       2     3     4     5     6     7     8       9     10     11     [facility] DRAW    8:30 AM   (15 min.)    [facility] LAB DRAW   [facility] Lab Draw     [facility] RETURN    8:45 AM   (30 min.)   Lisandro Aguiar MD   [facility]  Clinic 12     13     14     15       16     17     18     19     20     21     22       23     24     25     26     27     28     29       30                                                Lab Results:  Recent Results (from the past 12 hour(s))   CBC with platelets differential    Collection Time: 08/01/18  2:38 PM   Result Value Ref Range    WBC 7.3 4.0 - 11.0 10e9/L    RBC Count 3.94 3.8 - 5.2 10e12/L    Hemoglobin 12.1 11.7 - 15.7 g/dL    Hematocrit 38.1 35.0 - 47.0 %    MCV 97 78 - 100 fl    MCH 30.7 26.5 - 33.0 pg    MCHC 31.8 31.5 - 36.5 g/dL    RDW 13.2 10.0 - 15.0 %    Platelet Count 204 150 - 450 10e9/L    Diff Method Automated Method     % Neutrophils 49.4 %    % Lymphocytes 40.1 %    % Monocytes 6.6 %    % Eosinophils 3.4 %    % Basophils 0.4 %    % Immature Granulocytes 0.1 %    Nucleated RBCs 0 0 /100    Absolute Neutrophil 3.6 1.6 - 8.3 10e9/L    Absolute Lymphocytes 2.9 0.8 - 5.3 10e9/L    Absolute Monocytes 0.5 0.0 - 1.3 10e9/L    Absolute Eosinophils 0.3 0.0 - 0.7 10e9/L    Absolute Basophils 0.0 0.0 - 0.2 10e9/L    Abs Immature Granulocytes 0.0 0 - 0.4 10e9/L    Absolute Nucleated RBC 0.0    Comprehensive metabolic panel    Collection Time: 08/01/18  2:38 PM   Result Value Ref Range    Sodium 143 133 - 144 mmol/L    Potassium 4.1 3.4 - 5.3 mmol/L    Chloride 110 (H) 94 - 109 mmol/L    Carbon Dioxide 26 20 - 32 mmol/L    Anion Gap 7 3 - 14 mmol/L    Glucose 109 (H) 70 - 99 mg/dL    Urea Nitrogen 22 7 - 30 mg/dL    Creatinine 1.12 (H) 0.52 - 1.04 mg/dL    GFR Estimate 49 (L) >60 mL/min/1.7m2    GFR Estimate If Black 60 (L) >60 mL/min/1.7m2    Calcium 8.4 (L) 8.5 - 10.1 mg/dL    Bilirubin Total 0.2 0.2 - 1.3 mg/dL    Albumin 3.2 (L) 3.4 - 5.0 g/dL    Protein Total 7.4 6.8 - 8.8 g/dL    Alkaline Phosphatase 45 40 - 150 U/L    ALT 25 0 - 50 U/L    AST 24 0 - 45 U/L

## 2018-08-22 ENCOUNTER — ONCOLOGY VISIT (OUTPATIENT)
Dept: ONCOLOGY | Facility: CLINIC | Age: 63
End: 2018-08-22
Attending: PHYSICIAN ASSISTANT
Payer: COMMERCIAL

## 2018-08-22 ENCOUNTER — APPOINTMENT (OUTPATIENT)
Dept: LAB | Facility: CLINIC | Age: 63
End: 2018-08-22
Attending: INTERNAL MEDICINE
Payer: COMMERCIAL

## 2018-08-22 ENCOUNTER — INFUSION THERAPY VISIT (OUTPATIENT)
Dept: ONCOLOGY | Facility: CLINIC | Age: 63
End: 2018-08-22
Attending: INTERNAL MEDICINE
Payer: COMMERCIAL

## 2018-08-22 VITALS
HEART RATE: 75 BPM | TEMPERATURE: 98.5 F | HEIGHT: 62 IN | OXYGEN SATURATION: 100 % | DIASTOLIC BLOOD PRESSURE: 63 MMHG | BODY MASS INDEX: 33.6 KG/M2 | SYSTOLIC BLOOD PRESSURE: 120 MMHG | WEIGHT: 182.6 LBS

## 2018-08-22 DIAGNOSIS — C50.912 MALIGNANT NEOPLASM OF LEFT FEMALE BREAST, UNSPECIFIED ESTROGEN RECEPTOR STATUS, UNSPECIFIED SITE OF BREAST (H): ICD-10-CM

## 2018-08-22 DIAGNOSIS — G47.00 PERSISTENT INSOMNIA: ICD-10-CM

## 2018-08-22 DIAGNOSIS — C79.51 BONE METASTASIS: ICD-10-CM

## 2018-08-22 DIAGNOSIS — C50.912 MALIGNANT NEOPLASM OF LEFT FEMALE BREAST, UNSPECIFIED ESTROGEN RECEPTOR STATUS, UNSPECIFIED SITE OF BREAST (H): Primary | ICD-10-CM

## 2018-08-22 LAB
ALBUMIN SERPL-MCNC: 3.3 G/DL (ref 3.4–5)
ALP SERPL-CCNC: 39 U/L (ref 40–150)
ALT SERPL W P-5'-P-CCNC: 23 U/L (ref 0–50)
ANION GAP SERPL CALCULATED.3IONS-SCNC: 6 MMOL/L (ref 3–14)
AST SERPL W P-5'-P-CCNC: 20 U/L (ref 0–45)
BASOPHILS # BLD AUTO: 0 10E9/L (ref 0–0.2)
BASOPHILS NFR BLD AUTO: 0.4 %
BILIRUB SERPL-MCNC: 0.1 MG/DL (ref 0.2–1.3)
BUN SERPL-MCNC: 15 MG/DL (ref 7–30)
CALCIUM SERPL-MCNC: 8.5 MG/DL (ref 8.5–10.1)
CANCER AG27-29 SERPL-ACNC: 11 U/ML (ref 0–39)
CEA SERPL-MCNC: 0.5 UG/L (ref 0–2.5)
CHLORIDE SERPL-SCNC: 108 MMOL/L (ref 94–109)
CO2 SERPL-SCNC: 27 MMOL/L (ref 20–32)
CREAT SERPL-MCNC: 0.84 MG/DL (ref 0.52–1.04)
DIFFERENTIAL METHOD BLD: NORMAL
EOSINOPHIL # BLD AUTO: 0.2 10E9/L (ref 0–0.7)
EOSINOPHIL NFR BLD AUTO: 3.2 %
ERYTHROCYTE [DISTWIDTH] IN BLOOD BY AUTOMATED COUNT: 13.2 % (ref 10–15)
GFR SERPL CREATININE-BSD FRML MDRD: 68 ML/MIN/1.7M2
GLUCOSE SERPL-MCNC: 80 MG/DL (ref 70–99)
HCT VFR BLD AUTO: 38 % (ref 35–47)
HGB BLD-MCNC: 12 G/DL (ref 11.7–15.7)
IMM GRANULOCYTES # BLD: 0 10E9/L (ref 0–0.4)
IMM GRANULOCYTES NFR BLD: 0.1 %
LYMPHOCYTES # BLD AUTO: 3.1 10E9/L (ref 0.8–5.3)
LYMPHOCYTES NFR BLD AUTO: 41.9 %
MCH RBC QN AUTO: 30.1 PG (ref 26.5–33)
MCHC RBC AUTO-ENTMCNC: 31.6 G/DL (ref 31.5–36.5)
MCV RBC AUTO: 95 FL (ref 78–100)
MONOCYTES # BLD AUTO: 0.5 10E9/L (ref 0–1.3)
MONOCYTES NFR BLD AUTO: 6.5 %
NEUTROPHILS # BLD AUTO: 3.6 10E9/L (ref 1.6–8.3)
NEUTROPHILS NFR BLD AUTO: 47.9 %
NRBC # BLD AUTO: 0 10*3/UL
NRBC BLD AUTO-RTO: 0 /100
PLATELET # BLD AUTO: 172 10E9/L (ref 150–450)
POTASSIUM SERPL-SCNC: 4.2 MMOL/L (ref 3.4–5.3)
PROT SERPL-MCNC: 7.6 G/DL (ref 6.8–8.8)
RBC # BLD AUTO: 3.99 10E12/L (ref 3.8–5.2)
SODIUM SERPL-SCNC: 141 MMOL/L (ref 133–144)
WBC # BLD AUTO: 7.5 10E9/L (ref 4–11)

## 2018-08-22 PROCEDURE — 82378 CARCINOEMBRYONIC ANTIGEN: CPT | Performed by: INTERNAL MEDICINE

## 2018-08-22 PROCEDURE — 85025 COMPLETE CBC W/AUTO DIFF WBC: CPT | Performed by: INTERNAL MEDICINE

## 2018-08-22 PROCEDURE — 96413 CHEMO IV INFUSION 1 HR: CPT

## 2018-08-22 PROCEDURE — 80053 COMPREHEN METABOLIC PANEL: CPT | Performed by: INTERNAL MEDICINE

## 2018-08-22 PROCEDURE — G0463 HOSPITAL OUTPT CLINIC VISIT: HCPCS | Mod: ZF

## 2018-08-22 PROCEDURE — 25000128 H RX IP 250 OP 636: Mod: ZF | Performed by: PHYSICIAN ASSISTANT

## 2018-08-22 PROCEDURE — 86300 IMMUNOASSAY TUMOR CA 15-3: CPT | Performed by: INTERNAL MEDICINE

## 2018-08-22 PROCEDURE — 25000128 H RX IP 250 OP 636: Mod: ZF | Performed by: INTERNAL MEDICINE

## 2018-08-22 PROCEDURE — 99214 OFFICE O/P EST MOD 30 MIN: CPT | Mod: ZP | Performed by: PHYSICIAN ASSISTANT

## 2018-08-22 PROCEDURE — 96372 THER/PROPH/DIAG INJ SC/IM: CPT | Mod: 59

## 2018-08-22 RX ORDER — EPINEPHRINE 0.3 MG/.3ML
0.3 INJECTION SUBCUTANEOUS EVERY 5 MIN PRN
Status: CANCELLED | OUTPATIENT
Start: 2018-08-22

## 2018-08-22 RX ORDER — LORAZEPAM 2 MG/ML
0.5 INJECTION INTRAMUSCULAR EVERY 4 HOURS PRN
Status: CANCELLED
Start: 2018-08-22

## 2018-08-22 RX ORDER — HEPARIN SODIUM (PORCINE) LOCK FLUSH IV SOLN 100 UNIT/ML 100 UNIT/ML
5 SOLUTION INTRAVENOUS ONCE
Status: COMPLETED | OUTPATIENT
Start: 2018-08-22 | End: 2018-08-22

## 2018-08-22 RX ORDER — EPINEPHRINE 1 MG/ML
0.3 INJECTION, SOLUTION INTRAMUSCULAR; SUBCUTANEOUS EVERY 5 MIN PRN
Status: CANCELLED | OUTPATIENT
Start: 2018-08-22

## 2018-08-22 RX ORDER — SODIUM CHLORIDE 9 MG/ML
1000 INJECTION, SOLUTION INTRAVENOUS CONTINUOUS PRN
Status: CANCELLED
Start: 2018-08-22

## 2018-08-22 RX ORDER — HEPARIN SODIUM (PORCINE) LOCK FLUSH IV SOLN 100 UNIT/ML 100 UNIT/ML
5 SOLUTION INTRAVENOUS EVERY 8 HOURS
Status: CANCELLED | OUTPATIENT
Start: 2018-08-22

## 2018-08-22 RX ORDER — DIPHENHYDRAMINE HCL 25 MG
50 CAPSULE ORAL ONCE
Status: CANCELLED
Start: 2018-08-22

## 2018-08-22 RX ORDER — HEPARIN SODIUM (PORCINE) LOCK FLUSH IV SOLN 100 UNIT/ML 100 UNIT/ML
500 SOLUTION INTRAVENOUS ONCE
Status: COMPLETED | OUTPATIENT
Start: 2018-08-22 | End: 2018-08-22

## 2018-08-22 RX ORDER — MEPERIDINE HYDROCHLORIDE 25 MG/ML
25 INJECTION INTRAMUSCULAR; INTRAVENOUS; SUBCUTANEOUS EVERY 30 MIN PRN
Status: CANCELLED | OUTPATIENT
Start: 2018-08-22

## 2018-08-22 RX ORDER — ACETAMINOPHEN 325 MG/1
650 TABLET ORAL
Status: CANCELLED | OUTPATIENT
Start: 2018-08-22

## 2018-08-22 RX ORDER — ALBUTEROL SULFATE 0.83 MG/ML
2.5 SOLUTION RESPIRATORY (INHALATION)
Status: CANCELLED | OUTPATIENT
Start: 2018-08-22

## 2018-08-22 RX ORDER — DIPHENHYDRAMINE HYDROCHLORIDE 50 MG/ML
50 INJECTION INTRAMUSCULAR; INTRAVENOUS
Status: CANCELLED
Start: 2018-08-22

## 2018-08-22 RX ORDER — METHYLPREDNISOLONE SODIUM SUCCINATE 125 MG/2ML
125 INJECTION, POWDER, LYOPHILIZED, FOR SOLUTION INTRAMUSCULAR; INTRAVENOUS
Status: CANCELLED
Start: 2018-08-22

## 2018-08-22 RX ORDER — ALBUTEROL SULFATE 90 UG/1
1-2 AEROSOL, METERED RESPIRATORY (INHALATION)
Status: CANCELLED
Start: 2018-08-22

## 2018-08-22 RX ORDER — MIRTAZAPINE 15 MG/1
7.5 TABLET, FILM COATED ORAL AT BEDTIME
Qty: 90 TABLET | Refills: 3 | COMMUNITY
Start: 2018-08-22 | End: 2018-10-02

## 2018-08-22 RX ADMIN — DENOSUMAB 120 MG: 120 INJECTION SUBCUTANEOUS at 17:19

## 2018-08-22 RX ADMIN — SODIUM CHLORIDE, PRESERVATIVE FREE 5 ML: 5 INJECTION INTRAVENOUS at 16:04

## 2018-08-22 RX ADMIN — SODIUM CHLORIDE, PRESERVATIVE FREE 500 UNITS: 5 INJECTION INTRAVENOUS at 17:49

## 2018-08-22 RX ADMIN — TRASTUZUMAB 450 MG: 150 INJECTION, POWDER, LYOPHILIZED, FOR SOLUTION INTRAVENOUS at 17:15

## 2018-08-22 ASSESSMENT — PAIN SCALES - GENERAL: PAINLEVEL: NO PAIN (0)

## 2018-08-22 NOTE — PROGRESS NOTES
Oncology/Hematology Visit Note  Aug 22, 2018    Reason for Visit: follow up of ER positive, HER2 positive left metastatic breast cancer    History of Present Illness: Hoda Brush is a 63 year old female with metastatic ER positive, HER 2 positive left breast cancer with bone mets.     TREATMENT HISTORY:  A. Initial diagnosis with metastatic breast cancer in [facility].  Neoadjuvant CAF x 6.   B. Left mastectomy. Left axillary node dissection.  C.  Radiation in 1 dose to R iliac region. g Gy.  C. Herceptin for 2 years taxane for a prescribed course then stopped, monthly zoledronic acid.  She had 2 years of Herceptin with tamoxifen added after chemotherapy.  D.  Tamoxifen alone and zoledronic acid every 3 months.  E.  [place]  [place] restarted Herceptin every 3 weeks and continued tamoxifen. Bone targeted agent changed to denosumab every 6 weeks.     She was admitted 9/20-9/21 with LUE cellulitis with leukocytosis. Given a dose of IV Ancef and discharged on keflex. 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    She presents prior to her next dose of Herceptin.      Interval History:  Hoda is seen with  present. She states she has been feeling well. She tried Mirtazapine 15mg at bedtime and slept too much, so she reduced the dose by half and has had no further issues. She denies any pain, depression, anxiety. Appetite is too good she says, she has gained a couple pounds. She does walk with her  for 30 minutes several times per week.   She is taking calcium and vitamin D. She is tolerating tamoxifen well without any noticeable side effects. She  "denies any vaginal bleeding. Denies chest pain, dyspnea, dizziness, headaches, edema in legs.  Her 10-point review of systems is otherwise negative.     Current Outpatient Prescriptions   Medication Sig Dispense Refill     albuterol (PROAIR HFA/PROVENTIL HFA/VENTOLIN HFA) 108 (90 BASE) MCG/ACT Inhaler Inhale 2 puffs into the lungs every 6 hours as needed for shortness of breath / dyspnea or wheezing (Patient not taking: Reported on 6/19/2018) 1 Inhaler 0     benzonatate (TESSALON) 200 MG capsule Take 1 capsule (200 mg) by mouth 3 times daily as needed for cough 21 capsule 0     guaiFENesin-codeine (ROBITUSSIN AC) 100-10 MG/5ML SOLN solution Take 5-10 mLs by mouth every 6 hours as needed for cough (Patient not taking: Reported on 6/19/2018) 240 mL 0     mirtazapine (REMERON) 15 MG tablet Take 1 tablet (15 mg) by mouth At Bedtime 90 tablet 3     [place] [facility] [place] class 2 compression sleeve and gauntlet  Night garment  Bandaging supplies (Patient not taking: Reported on 6/19/2018) 1 each 1     tamoxifen (NOLVADEX) 20 MG tablet Take 1 tablet (20 mg) by mouth daily 90 tablet 3     VITAMIN D, CHOLECALCIFEROL, PO Take 1,000 Units by mouth daily         Physical Examination:  General: The patient is a pleasant female in no acute distress.  /63 (BP Location: Right arm, Patient Position: Chair, Cuff Size: Adult Large)  Pulse 75  Temp 98.5  F (36.9  C) (Oral)  Ht 1.575 m (5' 2.01\")  Wt 82.8 kg (182 lb 9.6 oz)  SpO2 100%  BMI 33.39 kg/m2  Wt Readings from Last 10 Encounters:   08/22/18 82.8 kg (182 lb 9.6 oz)   08/01/18 82.3 kg (181 lb 8 oz)   07/10/18 81.2 kg (179 lb)   06/19/18 80.9 kg (178 lb 6.4 oz)   05/29/18 79.8 kg (176 lb)   05/08/18 79.7 kg (175 lb 12.8 oz)   04/17/18 79.1 kg (174 lb 6.4 oz)   03/27/18 78.9 kg (173 lb 14.4 oz)   03/06/18 78 kg (172 lb)   02/13/18 79.7 kg (175 lb 12.8 oz)     HEENT: EOMI, PERRL. Sclerae are anicteric. Oral mucosa is pink and moist with no lesions or thrush.   Lymph: No " lymphadenopathy in the cervical, supraclavicular or axillary areas. Left arm with some non-pitting edema compared to right.  Breast: Patient declined today  Heart: Regular rate and rhythm.   Lungs: Clear to auscultation bilaterally.   Abdomen: Bowel sounds present, soft, nontender, nondistended  Extremities: No lower extremity edema noted bilaterally.   Neuro: Cranial nerves II through XII are grossly intact.  Skin: No rashes, petechiae, or bruising noted on exposed skin.    Laboratory Data:  Results for NATASHA LOUIS (MRN [id number]) as of 8/22/2018 16:46   8/22/2018 16:09   Sodium 141   Potassium 4.2   Chloride 108   Carbon Dioxide 27   Urea Nitrogen 15   Creatinine 0.84   GFR Estimate 68   GFR Estimate If Black 83   Calcium 8.5   Anion Gap 6   Albumin 3.3 (L)   Protein Total 7.6   Bilirubin Total 0.1 (L)   Alkaline Phosphatase 39 (L)   ALT 23   AST 20   Glucose 80   WBC 7.5   Hemoglobin 12.0   Hematocrit 38.0   Platelet Count 172   RBC Count 3.99   MCV 95   MCH 30.1   MCHC 31.6   RDW 13.2   Diff Method Automated Method   % Neutrophils 47.9   % Lymphocytes 41.9   % Monocytes 6.5   % Eosinophils 3.2   % Basophils 0.4   % Immature Granulocytes 0.1   Nucleated RBCs 0   Absolute Neutrophil 3.6   Absolute Lymphocytes 3.1   Absolute Monocytes 0.5   Absolute Eosinophils 0.2   Absolute Basophils 0.0   Abs Immature Granulocytes 0.0   Absolute Nucleated RBC 0.0     Ca 27-29 and CEA pending    Assessment and Plan:     1. Metastatic breast cancer, ER, [place], HER2+ positive: Was last treated in [facility] with Herceptin. We have continued Herceptin every 3 weeks as well as daily tamoxifen. She tolerates treatment extremely well without overt symptoms aside from some fatigue for 2 days after Herceptin. CT CAP on 7/5/18 with stable disease. Echocardiogram 6/19/2018 with EF 60-65% and normal LV function.  Continue Herceptin every 3 weeks. Continue Tamoxifen.   --Follow up on 9/11 with Dr. Aguiar with infusion for Hercpetin  after. Echo and labs prior  --Next CT CAP on 10/1 with labs and appointment with me prior to Herceptin    2. Bone metastasis: Continue xgeva every 6 weeks. On calcium + vitamin D. Xgeva due today  --Next dose due 10/1     3. LUE lymphedema: She completed lymphedema treatment and has a sleeve to use now.     4. Insomnia: She reduced mirtazipine to 7.5mg at bedtime due to excessive somnolence and the lower dose is working well for her.      Jossie Sparrow PA-C  [facility]  [address] [place] [address] [phone]

## 2018-08-22 NOTE — MR AVS SNAPSHOT
After Visit Summary   8/22/2018    Hoda Brush    MRN: [id number]           Patient Information     Date Of Birth          1955        Visit Information        Provider Department      8/22/2018 3:25 PM Jossie Sparrow PA; [employer] [facility]        Today's Diagnoses     Persistent insomnia        Malignant neoplasm of left female breast, unspecified estrogen receptor status, unspecified site of breast (H)           Follow-ups after your visit        Your next 10 appointments already scheduled     Aug 22, 2018  5:00 PM CDT   Infusion 60 with UC ONCOLOGY INFUSION, UC 10 ATC   [facility] ([facility])    [address]  [address]  [facility] [address]   [phone]            Sep 11, 2018  8:30 AM CDT   [facility] Lab Draw with  MASONIC LAB DRAW   [facility] Draw ([facility])    [address]  [address]  [facility] [place] [facility] [address]   [phone]            Sep 11, 2018  9:00 AM CDT   (Arrive by 8:45 AM)   Return Visit with Lisandro Aguiar MD   [facility] ([facility])    [address]  [address]  [facility] [place] [facility] [address]   [phone]            Oct 01, 2018  9:40 AM CDT   CT CHEST/ABDOMEN/PELVIS W CONTRAST with UCCT2   [facility] ([facility])    [address]  1st Floor  [facility] [place] [facility] [address]   [phone]           Please bring any scans or X-rays taken at other hospitals, if similar tests were done. Also bring a list of your medicines, including vitamins, minerals and over-the-counter drugs. It is safest to leave personal items at home.  Be sure to tell your doctor:   If you have any allergies.   If there s any chance you are pregnant.   If you are breastfeeding.  How to prepare:   Do not eat or drink for 2 hours before your exam. If you  need to take medicine, you may take it with small sips of water. (We may ask you to take liquid medicine as well.)   Please wear loose clothing, such as a sweat suit or jogging clothes. Avoid snaps, zippers and other metal. We may ask you to undress and put on a hospital gown.  Please arrive 30 minutes early for your CT. Once in the department you might be asked to drink water 15-20 minutes prior to your exam.  If indicated you may be asked to drink an oral contrast in advance of your CT.  If this is the case, the imaging team will let you know or be in contact with you prior to your appointment  Patients over 70 or patients with diabetes or kidney problems:   If you haven t had a blood test (creatinine test) within the last 30 days, the Cardiologist/Radiologist may require you to get this test prior to your exam.  If you have diabetes:   Continue to take your metformin medication on the day of your exam  If you have any questions, please call the Imaging Department where you will have your exam.            Oct 02, 2018  9:00 AM CDT   Ech Complete with [id number]   [facility] Echo ([facility])    [address] [place] [address]  3rd Floor  [facility] [address] [phone]           1.  Please bring or wear a comfortable two-piece outfit. 2.  You may eat, drink and take your normal medicines. 3.  For any questions that cannot be answered, please contact the ordering physician 4.  Please do not wear perfumes or scented lotions on the day of your exam.            Oct 02, 2018 10:00 AM CDT   [facility] Lab Draw with  [facility] LAB DRAW   [facility] Lab Draw ([facility])    [address] [place] [address]  [address]  [facility] [address] [phone]            Oct 02, 2018 10:30 AM CDT   (Arrive by 10:15 AM)   Return Visit with SEMAJ Eddy   [facility] ([facility])    [address] [place] [address]  [address]  [facility] [address]  "  [phone]            Oct 02, 2018 12:00 PM CDT   Infusion 60 with UC ONCOLOGY INFUSION, UC 28 ATC   [facility] ([facility])    [address]  [address]  [facility] [address] [phone]              Who to contact     If you have questions or need follow up information about today's clinic visit or your schedule please contact [facility] directly at [phone].  Normal or non-critical lab and imaging results will be communicated to you by [employer], letter or phone within 4 business days after the clinic has received the results. If you do not hear from us within 7 days, please contact the clinic through [employer] or phone. If you have a critical or abnormal lab result, we will notify you by phone as soon as possible.  Submit refill requests through [employer] or call your pharmacy and they will forward the refill request to us. Please allow 3 business days for your refill to be completed.          Additional Information About Your Visit        [employer] Information     [employer] gives you secure access to your electronic health record. If you see a primary care provider, you can also send messages to your care team and make appointments. If you have questions, please call your primary care clinic.  If you do not have a primary care provider, please call [phone] and they will assist you.        Care EveryWhere ID     This is your Care EveryWhere ID. This could be used by other organizations to access your [place] medical records  [id number]        Your Vitals Were     Pulse Temperature Height Pulse Oximetry BMI (Body Mass Index)       75 98.5  F (36.9  C) (Oral) 1.575 m (5' 2.01\") 100% 33.39 kg/m2        Blood Pressure from Last 3 Encounters:   08/22/18 120/63   08/01/18 125/78   07/10/18 104/67    Weight from Last 3 Encounters:   08/22/18 82.8 kg (182 lb 9.6 oz)   08/01/18 82.3 kg (181 lb 8 oz)   07/10/18 81.2 kg (179 lb)            "   Today, you had the following     No orders found for display         Today's Medication Changes          These changes are accurate as of 8/22/18  4:59 PM.  If you have any questions, ask your nurse or doctor.               These medicines have changed or have updated prescriptions.        Dose/Directions    mirtazapine 15 MG tablet   Commonly known as:  REMERON   This may have changed:  how much to take   Used for:  Persistent insomnia   Changed by:  Jossie Sparrow PA        Dose:  7.5 mg   Take 0.5 tablets (7.5 mg) by mouth At Bedtime   Quantity:  90 tablet   Refills:  3                Primary Care Provider Fax #    Physician No Ref-Primary [phone]       No address on file        Equal Access to Services     [facility] : Luz Marina Ramsey, ro jefferson, jamal wilkins, alexa angulo . So [facility] [phone].    ATENCIÓN: Si habla español, tiene a lagunas disposición servicios gratuitos de asistencia lingüística. Llame al [phone].    We comply with applicable federal civil rights laws and [place] laws. We do not discriminate on the basis of race, color, national origin, age, disability, sex, sexual orientation, or gender identity.            Thank you!     Thank you for choosing [facility]  for your care. Our goal is always to provide you with excellent care. Hearing back from our patients is one way we can continue to improve our services. Please take a few minutes to complete the written survey that you may receive in the mail after your visit with us. Thank you!             Your Updated Medication List - Protect others around you: Learn how to safely use, store and throw away your medicines at www.disposemymeds.org.          This list is accurate as of 8/22/18  4:59 PM.  Always use your most recent med list.                   Brand Name Dispense Instructions for use Diagnosis    albuterol 108 (90 Base) MCG/ACT inhaler    PROAIR  HFA/PROVENTIL HFA/VENTOLIN HFA    1 Inhaler    Inhale 2 puffs into the lungs every 6 hours as needed for shortness of breath / dyspnea or wheezing    Cough       benzonatate 200 MG capsule    TESSALON    21 capsule    Take 1 capsule (200 mg) by mouth 3 times daily as needed for cough    Cough       guaiFENesin-codeine 100-10 MG/5ML Soln solution    ROBITUSSIN AC    240 mL    Take 5-10 mLs by mouth every 6 hours as needed for cough    Cough       mirtazapine 15 MG tablet    REMERON    90 tablet    Take 0.5 tablets (7.5 mg) by mouth At Bedtime    Persistent insomnia       order for DME     1 each    L UE class 2 compression sleeve and gauntlet Night garment Bandaging supplies    Lymphedema of left upper extremity       tamoxifen 20 MG tablet    NOLVADEX    90 tablet    Take 1 tablet (20 mg) by mouth daily    Cancer of central portion of left breast (H)       VITAMIN D (CHOLECALCIFEROL) PO      Take 1,000 Units by mouth daily

## 2018-08-22 NOTE — NURSING NOTE
Chief Complaint   Patient presents with     Port Draw     labs drawn via port by RN     /63 (BP Location: Right arm, Patient Position: Chair, Cuff Size: Adult Large)  Pulse 75  Temp 98.5  F (36.9  C) (Oral)  Wt 82.8 kg (182 lb 9.6 oz)  SpO2 100%  BMI 33.4 kg/m2    Vitals taken. Port accessed by RN. Labs collected and sent. Line flushed with NS & Heparin. Pt tolerated well. Pt checked in for next appointment.    Marlene Alonso RN

## 2018-08-22 NOTE — MR AVS SNAPSHOT
After Visit Summary   8/22/2018    Hoda Brush    MRN: [id number]           Patient Information     Date Of Birth          1955        Visit Information        Provider Department      8/22/2018 5:00 PM YULISSA RODRIGUEZ TRANSLATION SERVICES;  10 ATC;  ONCOLOGY INFUSION [facility]        Today's Diagnoses     Malignant neoplasm of left female breast, unspecified estrogen receptor status, unspecified site of breast (H)    -  1    Bone metastasis (H)          [facility] Main Line: [phone]    Call triage nurse with chills and/or temperature greater than or equal to 100.4, uncontrolled nausea/vomiting, diarrhea, constipation, dizziness, shortness of breath, chest pain, bleeding, unexplained bruising, or any new/concerning symptoms, questions/concerns.   If you are having any concerning symptoms or wish to speak to a provider before your next infusion visit, please call your care coordinator or triage to notify them so we can adequately serve you.   Triage Nurse Line: [phone]    If after hours, weekends, or holidays [phone]               August 2018 Sunday Monday Tuesday Wednesday Thursday Friday Saturday 1     [facility] LAB DRAW    2:30 PM   (30 min.)   [facility]   [facility] Draw     P ONC INFUSION 60    3:00 PM   (120 min.)    ONCOLOGY INFUSION   [facility] 2     3     4       5     6     7     8     9     10     11       12     13     14     15     16     17     18       19     20     21     22     P [facility] LAB DRAW    3:00 PM   (30 min.)    [facility] LAB DRAW   [facility] Lab Draw     [facility] RETURN    3:25 PM   (90 min.)   Jossie Sparrow PA   [facility]     [facility] ONC INFUSION 60    5:00 PM   (75 min.)    ONCOLOGY INFUSION   [facility] 23     24     25       26     27     28     29     30     31                      September 2018 Sunday Monday Tuesday Wednesday Thursday Friday Saturday                                 1       2     3     4     5     6     7     8       9     10     11     [facility] LAB DRAW    8:30 AM   (15 min.)    [facility] LAB DRAW   [facility] Lab Draw     [facility] RETURN    8:45 AM   (30 min.)   Lisandro Aguiar MD   [facility] 12     13     14     15       16     17     18     19     20     21     22       23     24     25     26     27     28     29       30                                                Lab Results:  Recent Results (from the past 12 hour(s))   CBC with platelets differential    Collection Time: 08/22/18  4:09 PM   Result Value Ref Range    WBC 7.5 4.0 - 11.0 10e9/L    RBC Count 3.99 3.8 - 5.2 10e12/L    Hemoglobin 12.0 11.7 - 15.7 g/dL    Hematocrit 38.0 35.0 - 47.0 %    MCV 95 78 - 100 fl    MCH 30.1 26.5 - 33.0 pg    MCHC 31.6 31.5 - 36.5 g/dL    RDW 13.2 10.0 - 15.0 %    Platelet Count 172 150 - 450 10e9/L    Diff Method Automated Method     % Neutrophils 47.9 %    % Lymphocytes 41.9 %    % Monocytes 6.5 %    % Eosinophils 3.2 %    % Basophils 0.4 %    % Immature Granulocytes 0.1 %    Nucleated RBCs 0 0 /100    Absolute Neutrophil 3.6 1.6 - 8.3 10e9/L    Absolute Lymphocytes 3.1 0.8 - 5.3 10e9/L    Absolute Monocytes 0.5 0.0 - 1.3 10e9/L    Absolute Eosinophils 0.2 0.0 - 0.7 10e9/L    Absolute Basophils 0.0 0.0 - 0.2 10e9/L    Abs Immature Granulocytes 0.0 0 - 0.4 10e9/L    Absolute Nucleated RBC 0.0    Comprehensive metabolic panel    Collection Time: 08/22/18  4:09 PM   Result Value Ref Range    Sodium 141 133 - 144 mmol/L    Potassium 4.2 3.4 - 5.3 mmol/L    Chloride 108 94 - 109 mmol/L    Carbon Dioxide 27 20 - 32 mmol/L    Anion Gap 6 3 - 14 mmol/L    Glucose 80 70 - 99 mg/dL    Urea Nitrogen 15 7 - 30 mg/dL    Creatinine 0.84 0.52 - 1.04 mg/dL    GFR Estimate 68 >60 mL/min/1.7m2    GFR Estimate If Black 83 >60 mL/min/1.7m2    Calcium 8.5  8.5 - 10.1 mg/dL    Bilirubin Total 0.1 (L) 0.2 - 1.3 mg/dL    Albumin 3.3 (L) 3.4 - 5.0 g/dL    Protein Total 7.6 6.8 - 8.8 g/dL    Alkaline Phosphatase 39 (L) 40 - 150 U/L    ALT 23 0 - 50 U/L    AST 20 0 - 45 U/L   CA 27.29 Breast tumor marker    Collection Time: 08/22/18  4:09 PM   Result Value Ref Range    CA 27-29 11 0 - 39 U/mL   CEA    Collection Time: 08/22/18  4:09 PM   Result Value Ref Range    CEA 0.5 0 - 2.5 ug/L             Follow-ups after your visit        Your next 10 appointments already scheduled     Sep 11, 2018  8:30 AM CDT   [facility] Lab Draw with  [facility] LAB DRAW   [facility] Lab Draw ([facility])    [address]  [address]  [facility] [address]   [phone]            Sep 11, 2018  9:00 AM CDT   (Arrive by 8:45 AM)   Return Visit with Lisandro Aguiar MD   [facility] ([facility])    [address]  [address]  [facility] [address] [phone]            Oct 01, 2018  9:40 AM CDT   CT CHEST/ABDOMEN/PELVIS W CONTRAST with UCCT2   [facility] [place] [facility] ([facility])    [address]  1st Floor  [facility] [address]   [phone]           Please bring any scans or X-rays taken at other hospitals, if similar tests were done. Also bring a list of your medicines, including vitamins, minerals and over-the-counter drugs. It is safest to leave personal items at home.  Be sure to tell your doctor:   If you have any allergies.   If there s any chance you are pregnant.   If you are breastfeeding.  How to prepare:   Do not eat or drink for 2 hours before your exam. If you need to take medicine, you may take it with small sips of water. (We may ask you to take liquid medicine as well.)   Please wear loose clothing, such as a sweat suit or jogging clothes. Avoid snaps, zippers and other metal. We may ask you to undress and put on a hospital gown.  Please  arrive 30 minutes early for your CT. Once in the department you might be asked to drink water 15-20 minutes prior to your exam.  If indicated you may be asked to drink an oral contrast in advance of your CT.  If this is the case, the imaging team will let you know or be in contact with you prior to your appointment  Patients over 70 or patients with diabetes or kidney problems:   If you haven t had a blood test (creatinine test) within the last 30 days, the Cardiologist/Radiologist may require you to get this test prior to your exam.  If you have diabetes:   Continue to take your metformin medication on the day of your exam  If you have any questions, please call the Imaging Department where you will have your exam.            Oct 02, 2018  9:00 AM CDT   Ech Complete with UCECHCR4   [facility])    [address] [place] [address]  3rd [facility] [id number] [phone]           1.  Please bring or wear a comfortable two-piece outfit. 2.  You may eat, drink and take your normal medicines. 3.  For any questions that cannot be answered, please contact the ordering physician 4.  Please do not wear perfumes or scented lotions on the day of your exam.            Oct 02, 2018 10:00 AM CDT   [facility] Lab Draw with  [facility] LAB DRAW   [facility] Lab Draw ([facility])    [address] [place] [address]  [address]  [facility] [address] [phone]            Oct 02, 2018 10:30 AM CDT   (Arrive by 10:15 AM)   Return Visit with SEMAJ Eddy   [facility] ([facility])    [address] [place] [address]  [address]  [facility] [id number] [phone]            Oct 02, 2018 12:00 PM CDT   Infusion 60 with RASHAAD ONCOLOGY INFUSION, UC 28 ATC   [facility] ([facility])    [address] [place] [address]  [address]  [facility] [id number] [phone]              Who to contact     If  you have questions or need follow up information about today's clinic visit or your schedule please contact [facility] directly at [phone].  Normal or non-critical lab and imaging results will be communicated to you by CaratLanehart, letter or phone within 4 business days after the clinic has received the results. If you do not hear from us within 7 days, please contact the clinic through CaratLanehart or phone. If you have a critical or abnormal lab result, we will notify you by phone as soon as possible.  Submit refill requests through KonnectAgain or call your pharmacy and they will forward the refill request to us. Please allow 3 business days for your refill to be completed.          Additional Information About Your Visit        [employer]ane[employer]Vo[employer] Systems Information     KonnectAgain gives you secure access to your electronic health record. If you see a primary care provider, you can also send messages to your care team and make appointments. If you have questions, please call your primary care clinic.  If you do not have a primary care provider, please call [phone] and they will assist you.        Care EveryWhere ID     This is your Care EveryWhere ID. This could be used by other organizations to access your [place] medical records  [id number]         Blood Pressure from Last 3 Encounters:   08/22/18 120/63   08/01/18 125/78   07/10/18 104/67    Weight from Last 3 Encounters:   08/22/18 82.8 kg (182 lb 9.6 oz)   08/01/18 82.3 kg (181 lb 8 oz)   07/10/18 81.2 kg (179 lb)              We Performed the Following     CA 27.29 Breast tumor marker     CBC with platelets differential     CEA     Comprehensive metabolic panel          Today's Medication Changes          These changes are accurate as of 8/22/18  7:04 PM.  If you have any questions, ask your nurse or doctor.               These medicines have changed or have updated prescriptions.        Dose/Directions    mirtazapine 15 MG tablet   Commonly known as:  REMERON    This may have changed:  how much to take   Used for:  Persistent insomnia   Changed by:  Jossie Sparrow PA        Dose:  7.5 mg   Take 0.5 tablets (7.5 mg) by mouth At Bedtime   Quantity:  90 tablet   Refills:  3                Primary Care Provider Fax #    Physician No Ref-Primary [phone]       No address on file        Equal Access to Services     [facility]: Hadii aad ku hadasho Soomaali, waaxda luqadaha, qaybta kaalmada adeegyada, alexa nix jesusn adesarah reeder laquocnba . So [facility] [phone].    ATENCIÓN: Si habla español, tiene a lagunas disposición servicios gratuitos de asistencia lingüística. Llame al [phone].    We comply with applicable federal civil rights laws and [place] laws. We do not discriminate on the basis of race, color, national origin, age, disability, sex, sexual orientation, or gender identity.            Thank you!     Thank you for choosing [facility]  for your care. Our goal is always to provide you with excellent care. Hearing back from our patients is one way we can continue to improve our services. Please take a few minutes to complete the written survey that you may receive in the mail after your visit with us. Thank you!             Your Updated Medication List - Protect others around you: Learn how to safely use, store and throw away your medicines at www.disposemymeds.org.          This list is accurate as of 8/22/18  7:04 PM.  Always use your most recent med list.                   Brand Name Dispense Instructions for use Diagnosis    albuterol 108 (90 Base) MCG/ACT inhaler    PROAIR HFA/PROVENTIL HFA/VENTOLIN HFA    1 Inhaler    Inhale 2 puffs into the lungs every 6 hours as needed for shortness of breath / dyspnea or wheezing    Cough       benzonatate 200 MG capsule    TESSALON    21 capsule    Take 1 capsule (200 mg) by mouth 3 times daily as needed for cough    Cough       guaiFENesin-codeine 100-10 MG/5ML Soln solution    ROBITUSSIN AC     240 mL    Take 5-10 mLs by mouth every 6 hours as needed for cough    Cough       mirtazapine 15 MG tablet    REMERON    90 tablet    Take 0.5 tablets (7.5 mg) by mouth At Bedtime    Persistent insomnia       order for DME     1 each    L UE class 2 compression sleeve and gauntlet Night garment Bandaging supplies    Lymphedema of left upper extremity       tamoxifen 20 MG tablet    NOLVADEX    90 tablet    Take 1 tablet (20 mg) by mouth daily    Cancer of central portion of left breast (H)       VITAMIN D (CHOLECALCIFEROL) PO      Take 1,000 Units by mouth daily

## 2018-08-22 NOTE — NURSING NOTE
"Oncology Rooming Note    August 22, 2018 4:16 PM   Hoda Brush is a 63 year old female who presents for:    Chief Complaint   Patient presents with     Port Draw     labs drawn via port by RN     RECHECK     ONc Breast Ca     Initial Vitals: /63 (BP Location: Right arm, Patient Position: Chair, Cuff Size: Adult Large)  Pulse 75  Temp 98.5  F (36.9  C) (Oral)  Ht 1.575 m (5' 2.01\")  Wt 82.8 kg (182 lb 9.6 oz)  SpO2 100%  BMI 33.39 kg/m2 Estimated body mass index is 33.39 kg/(m^2) as calculated from the following:    Height as of this encounter: 1.575 m (5' 2.01\").    Weight as of this encounter: 82.8 kg (182 lb 9.6 oz). Body surface area is 1.9 meters squared.  No Pain (0) Comment: Data Unavailable   No LMP recorded. Patient is postmenopausal.  Allergies reviewed: Yes  Medications reviewed: Yes    Medications: Medication refills not needed today.  Pharmacy name entered into Physitrack:    [id number] [place] [facility] [place], [place] - [address] [id number] - [place], [place] - [address] [facility] [address] [place], [place] [id number] - [place], [place] - [address] AT  [address] & [facility]    Clinical concerns: none      6 minutes for nursing intake (face to face time)     Елена HEATHER Dickerson              "

## 2018-08-22 NOTE — LETTER
8/22/2018      RE: Hoda Brush  [address] [address]       Oncology/Hematology Visit Note  Aug 22, 2018    Reason for Visit: follow up of ER positive, HER2 positive left metastatic breast cancer    History of Present Illness: Hoda Brush is a 63 year old female with metastatic ER positive, HER 2 positive left breast cancer with bone mets.     TREATMENT HISTORY:  A. Initial diagnosis with metastatic breast cancer in [facility].  Neoadjuvant CAF x 6.   B. Left mastectomy. Left axillary node dissection.  C.  Radiation in 1 dose to R iliac region. g Gy.  C. Herceptin for 2 years taxane for a prescribed course then stopped, monthly zoledronic acid.  She had 2 years of Herceptin with tamoxifen added after chemotherapy.  D.  Tamoxifen alone and zoledronic acid every 3 months.  E.  [place]  We restarted Herceptin every 3 weeks and continued tamoxifen. Bone targeted agent changed to denosumab every 6 weeks.     She was admitted 9/20-9/21 with LUE cellulitis with leukocytosis. Given a dose of IV Ancef and discharged on keflex. 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    She presents prior to her next dose of Herceptin.      Interval History:  Hoda is seen with  present. She states she has been feeling well. She tried Mirtazapine 15mg at bedtime and slept too much, so she reduced the dose by half and has had no further issues. She denies any pain, depression, anxiety. Appetite is too good she says, she has gained a couple pounds. She does walk with her  for 30 minutes several times per week.   She is taking calcium and  "vitamin D. She is tolerating tamoxifen well without any noticeable side effects. She denies any vaginal bleeding. Denies chest pain, dyspnea, dizziness, headaches, edema in legs.  Her 10-point review of systems is otherwise negative.     Current Outpatient Prescriptions   Medication Sig Dispense Refill     albuterol (PROAIR HFA/PROVENTIL HFA/VENTOLIN HFA) 108 (90 BASE) MCG/ACT Inhaler Inhale 2 puffs into the lungs every 6 hours as needed for shortness of breath / dyspnea or wheezing (Patient not taking: Reported on 6/19/2018) 1 Inhaler 0     benzonatate (TESSALON) 200 MG capsule Take 1 capsule (200 mg) by mouth 3 times daily as needed for cough 21 capsule 0     guaiFENesin-codeine (ROBITUSSIN AC) 100-10 MG/5ML SOLN solution Take 5-10 mLs by mouth every 6 hours as needed for cough (Patient not taking: Reported on 6/19/2018) 240 mL 0     mirtazapine (REMERON) 15 MG tablet Take 1 tablet (15 mg) by mouth At Bedtime 90 tablet 3     order for DME L UE class 2 compression sleeve and gauntlet  Night garment  Bandaging supplies (Patient not taking: Reported on 6/19/2018) 1 each 1     tamoxifen (NOLVADEX) 20 MG tablet Take 1 tablet (20 mg) by mouth daily 90 tablet 3     VITAMIN D, CHOLECALCIFEROL, PO Take 1,000 Units by mouth daily         Physical Examination:  General: The patient is a pleasant female in no acute distress.  /63 (BP Location: Right arm, Patient Position: Chair, Cuff Size: Adult Large)  Pulse 75  Temp 98.5  F (36.9  C) (Oral)  Ht 1.575 m (5' 2.01\")  Wt 82.8 kg (182 lb 9.6 oz)  SpO2 100%  BMI 33.39 kg/m2  Wt Readings from Last 10 Encounters:   08/22/18 82.8 kg (182 lb 9.6 oz)   08/01/18 82.3 kg (181 lb 8 oz)   07/10/18 81.2 kg (179 lb)   06/19/18 80.9 kg (178 lb 6.4 oz)   05/29/18 79.8 kg (176 lb)   05/08/18 79.7 kg (175 lb 12.8 oz)   04/17/18 79.1 kg (174 lb 6.4 oz)   03/27/18 78.9 kg (173 lb 14.4 oz)   03/06/18 78 kg (172 lb)   02/13/18 79.7 kg (175 lb 12.8 oz)     HEENT: EOMI, PERRL. Sclerae are " anicteric. Oral mucosa is pink and moist with no lesions or thrush.   Lymph: No lymphadenopathy in the cervical, supraclavicular or axillary areas. Left arm with some non-pitting edema compared to right.  Breast: Patient declined today  Heart: Regular rate and rhythm.   Lungs: Clear to auscultation bilaterally.   Abdomen: Bowel sounds present, soft, nontender, nondistended  Extremities: No lower extremity edema noted bilaterally.   Neuro: Cranial nerves II through XII are grossly intact.  Skin: No rashes, petechiae, or bruising noted on exposed skin.    Laboratory Data:  Results for NATASHA LOUIS (MRN [id number]) as of 8/22/2018 16:46   8/22/2018 16:09   Sodium 141   Potassium 4.2   Chloride 108   Carbon Dioxide 27   Urea Nitrogen 15   Creatinine 0.84   GFR Estimate 68   GFR Estimate If Black 83   Calcium 8.5   Anion Gap 6   Albumin 3.3 (L)   Protein Total 7.6   Bilirubin Total 0.1 (L)   Alkaline Phosphatase 39 (L)   ALT 23   AST 20   Glucose 80   WBC 7.5   Hemoglobin 12.0   Hematocrit 38.0   Platelet Count 172   RBC Count 3.99   MCV 95   MCH 30.1   MCHC 31.6   RDW 13.2   Diff Method Automated Method   % Neutrophils 47.9   % Lymphocytes 41.9   % Monocytes 6.5   % Eosinophils 3.2   % Basophils 0.4   % Immature Granulocytes 0.1   Nucleated RBCs 0   Absolute Neutrophil 3.6   Absolute Lymphocytes 3.1   Absolute Monocytes 0.5   Absolute Eosinophils 0.2   Absolute Basophils 0.0   Abs Immature Granulocytes 0.0   Absolute Nucleated RBC 0.0     Ca 27-29 and CEA pending    Assessment and Plan:     1. Metastatic breast cancer, ER, [place], HER2+ positive: Was last treated in [facility] with Herceptin. We have continued Herceptin every 3 weeks as well as daily tamoxifen. She tolerates treatment extremely well without overt symptoms aside from some fatigue for 2 days after Herceptin. CT CAP on 7/5/18 with stable disease. Echocardiogram  6/19/2018 with EF 60-65% and normal LV function.  Continue Herceptin every 3 weeks. Continue  Tamoxifen.   --Follow up on 9/11 with Dr. Aguiar with infusion for Hercpetin after. Echo and labs prior  --Next CT CAP on 10/1 with labs and appointment with me prior to Herceptin    2. Bone metastasis: Continue xgeva every 6 weeks. On calcium + vitamin D. Xgeva due today  --Next dose due 10/1     3. LUE lymphedema: She completed lymphedema treatment and has a sleeve to use now.     4. Insomnia: She reduced mirtazipine to 7.5mg at bedtime due to excessive somnolence and the lower dose is working well for her.      Jossie Sparrow PA-C  [facility]  [address] [place], [place] [id number] [phone]      SEMAJ Arnold

## 2018-08-23 NOTE — PATIENT INSTRUCTIONS
[facility] Main Line: [phone]    Call triage nurse with chills and/or temperature greater than or equal to 100.4, uncontrolled nausea/vomiting, diarrhea, constipation, dizziness, shortness of breath, chest pain, bleeding, unexplained bruising, or any new/concerning symptoms, questions/concerns.   If you are having any concerning symptoms or wish to speak to a provider before your next infusion visit, please call your care coordinator or triage to notify them so we can adequately serve you.   Triage Nurse Line: [phone]    If after hours, weekends, or holidays [phone]               August 2018 Sunday Monday Tuesday Wednesday Thursday Friday Saturday                  1     [facility]    2:30 PM   (30 min.)   [facility]   [facility] Draw     [facility] ONC INFUSION 60    3:00 PM   (120 min.)    ONCOLOGY INFUSION   [facility] 2     3     4       5     6     7     8     9     10     11       12     13     14     15     16     17     18       19     20     21     22     [facility]    3:00 PM   (30 min.)    [facility]     [facility] RETURN    3:25 PM   (90 min.)   Jossie Sparrow PA   [facility]     [facility] ONC INFUSION 60    5:00 PM   (75 min.)    ONCOLOGY INFUSION   [facility] 23     24     25       26     27     28     29     30     31 September 2018 Sunday Monday Tuesday Wednesday Thursday Friday Saturday                                 1       2     3     4     5     6     7     8       9     10     11     [facility]    8:30 AM   (15 min.)   [facility]     [facility] RETURN    8:45 AM   (30 min.)   Lisandro Aguiar MD   [facility] 12     13     14     15       16     17     18     19     20     21     22       23     24     25     26     27     28     29       30                                                 Lab Results:  Recent Results (from the past 12 hour(s))   CBC with platelets differential    Collection Time: 08/22/18  4:09 PM   Result Value Ref Range    WBC 7.5 4.0 - 11.0 10e9/L    RBC Count 3.99 3.8 - 5.2 10e12/L    Hemoglobin 12.0 11.7 - 15.7 g/dL    Hematocrit 38.0 35.0 - 47.0 %    MCV 95 78 - 100 fl    MCH 30.1 26.5 - 33.0 pg    MCHC 31.6 31.5 - 36.5 g/dL    RDW 13.2 10.0 - 15.0 %    Platelet Count 172 150 - 450 10e9/L    Diff Method Automated Method     % Neutrophils 47.9 %    % Lymphocytes 41.9 %    % Monocytes 6.5 %    % Eosinophils 3.2 %    % Basophils 0.4 %    % Immature Granulocytes 0.1 %    Nucleated RBCs 0 0 /100    Absolute Neutrophil 3.6 1.6 - 8.3 10e9/L    Absolute Lymphocytes 3.1 0.8 - 5.3 10e9/L    Absolute Monocytes 0.5 0.0 - 1.3 10e9/L    Absolute Eosinophils 0.2 0.0 - 0.7 10e9/L    Absolute Basophils 0.0 0.0 - 0.2 10e9/L    Abs Immature Granulocytes 0.0 0 - 0.4 10e9/L    Absolute Nucleated RBC 0.0    Comprehensive metabolic panel    Collection Time: 08/22/18  4:09 PM   Result Value Ref Range    Sodium 141 133 - 144 mmol/L    Potassium 4.2 3.4 - 5.3 mmol/L    Chloride 108 94 - 109 mmol/L    Carbon Dioxide 27 20 - 32 mmol/L    Anion Gap 6 3 - 14 mmol/L    Glucose 80 70 - 99 mg/dL    Urea Nitrogen 15 7 - 30 mg/dL    Creatinine 0.84 0.52 - 1.04 mg/dL    GFR Estimate 68 >60 mL/min/1.7m2    GFR Estimate If Black 83 >60 mL/min/1.7m2    Calcium 8.5 8.5 - 10.1 mg/dL    Bilirubin Total 0.1 (L) 0.2 - 1.3 mg/dL    Albumin 3.3 (L) 3.4 - 5.0 g/dL    Protein Total 7.6 6.8 - 8.8 g/dL    Alkaline Phosphatase 39 (L) 40 - 150 U/L    ALT 23 0 - 50 U/L    AST 20 0 - 45 U/L   CA 27.29 Breast tumor marker    Collection Time: 08/22/18  4:09 PM   Result Value Ref Range    CA 27-29 11 0 - 39 U/mL   CEA    Collection Time: 08/22/18  4:09 PM   Result Value Ref Range    CEA 0.5 0 - 2.5 ug/L

## 2018-09-09 NOTE — PROGRESS NOTES
Hoda is seen with a Ghanaian . The patient is a refugee from [facility] and is here for continuation of care for her metastatic ER+HER2- breast cancer.  She is a [place] refugee from [facility] and was seen in clinic with her daughter.  The only data we have from [facility] is an English translation of her records.       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  On 02/11/2014, she was seen by an oncologist.  The clinical staging of T4b N2 M1 was noted.   Her breast cancer was on the left side. There was no right breast cancer, confirmed by the patient and her daughter, correcting a possible error in the translated records.  ER was positive in 100% of the cells, CA at 14% and HER2 was 3+ positive.  It appears that this histopathologic information is on the mastectomy specimen. She underwent an MRI for staging of presumptive bone metastases which was performed 03/02/2014.  There were skeletal metastases in the thoracic vertebrae at 12, L1, L3, L5 and S1 vertebral body, ranging in size from 0.7-3.0 cm in size.  Ischial and right femur were also involved, as well as a large iliac mass measuring about 15 cm in the uterus. 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   Radiation Oncology consultation was performed 03/11/2014, and she was given radiation in 1 dose of 6 Gy to the right iliac lesion.        With the initial diagnosis, she initiated treatment with 6 cycles of CAF neoadjuvant chemotherapy 02/14, 07/07, 03/28, 04/18/14, 05/08 and 05/29/14. She also received monthly zoledronic acid.  She then underwent a modified left mastectomy of Palacios type and left lymphadenoectomy on 06/27/2014.   Pathologic examination showed number at [facility] was [id number] showed infiltrative grade 2 ductal cancer with 6 level 1 metastatic lymph nodes and 3 level 2 metastatic lymph nodes.  The differentiation was intermediate.  The tumor was ER  positive 70% of the cells, MO positive in 40% of the cells and HER2 was 3+ by immunohistochemistry and the Ki-67 labeling index was 20%. Her staging after surgery was stage IV, pT4b N2 M1.  I don't see a biopsy of the skeletal metastases.  She then had continuation with chemotherapy with 4 cycles of Herceptin and taxane with monthly zoledronic acid.  Tamoxifen was initiated.  She then had two years of Herceptin and a decision was made not to continue further Herceptin.  She continued on zoledronic acid every 3 months. She was clinically stable. [place] then [place] the [place] new refugee from [facility].  She arrived last month, established [place] residency and now seeks further oncology care.       TREATMENT HISTORY:  A. Initial diagnosis with metastatic breast cancer in [facility].  Neoadjuvant CAF x 6.   B. Left mastectomy. Left axillary node dissection.  C.  Radiation in 1 dose to R iliac region. g Gy.  C. Herceptin for 2 years taxane for a prescribed course then stopped, monthly zoledronic acid.  She had 2 years of Herceptin with tamoxifen added after chemotherapy.  D.  Tamoxifen alone and zoledronic acid every 3 months.  E.  Move to [facility].  We restarted Herceptin every 3 weeks and continued tamoxifen. Bone targeted agent changed to denosumab every 9 weeks.        FOLLOWUP NOTE      INTERVAL HISTORY:  Hoda returns to clinic and has been feeling generally well.  She has no pain, no fatigue, no depression, no anxiety.      REVIEW OF SYSTEMS:  A 10-point review of systems is entirely negative.  She is eating a healthful diet.  She is walking more than 150 minutes a week.  She takes calcium and vitamin D.      PHYSICAL EXAMINATION:   VITAL SIGNS:  Blood pressure 108/68, temperature 98.2, pulse 72, respirations 18, O2 sat 97% on room air and weight 82.7 kg.   GENERAL:  Hoda appeared generally well.  She has no alopecia.   HEENT:  Oropharynx is without lesions.   LYMPH:  There is no palpable cervical,  supraclavicular, subclavicular or axillary lymphadenopathy.   BREASTS:  Exam was not performed today.   LUNGS:  Clear to percussion and auscultation.   HEART:  Regular rate and rhythm, S1, S2.   ABDOMEN:  Soft and nontender, without hepatosplenomegaly.   EXTREMITIES:  Without edema.   PSYCHIATRIC:  Mood and affect were normal.      LABORATORY DATA:  CBC showed WBC of 7.4, hemoglobin 11.6, platelets 203,000.  The CMP showed an albumin of 3.2.      IMAGING:  Echocardiogram results are pending.      ASSESSMENT AND PLAN:     1.  Hoda Brush is a 63-year-old woman with a history of ER-positive, SD-positive, HER2-positive breast cancer.  She is from [facility], formerly from [facility], and came [place]  She lives with her daughter and is here for continuation of every 3-week Herceptin and tamoxifen.  Her tumor is ER positive, SD positive, and HER2 positive.  She had metastatic disease at the time of presentation with bone-only metastases by report.  She presented with metastatic disease in 02/2014, staging was initially stage IV, T4 N2 M1 invasive ductal carcinoma of the left breast.  She had a right hip metastasis.  She underwent neoadjuvant CAF, left mastectomy, left axillary lymph node dissection and radiation.  She had radiation of the right iliac region where she had metastatic disease.  Pathology from [facility] shows the tumor to be positive for ER in 75% of cells, SD positive in 40% of the cells, and HER2 was 3+ positive by immunohistochemistry.  Ki-67 was 20%.  She has no evidence of nonbone metastatic disease on her PET/CT from 08/2017.   2.  Insomnia, has been responsive to mirtazapine.  Restaging will be performed on 10/01.   3.  Discussion of diet and exercise.  She is eating a healthful diet and she is exercising.   4.  Discussion of bone health.  She will continue with calcium and vitamin D.   5.  Discussion of duration of treatment with tamoxifen.  I discussed that tamoxifen could be continued  indefinitely, that we have safety data up to 10 years and that it is possible to consider raloxifene after that if her disease is stable.  She has no vaginal bleeding and no evidence of endometrial issues at this time.  I do think that yearly GYN followup is reasonable.   Gynecology follow up planned for later this year.   6.  Followup.  We will see Hoda in followup in our clinic in 3 weeks with Jossie Sparrow.      Thank you for allowing us to continue to participate in Hoda's care.         Lisandro Aguiar MD       [facility]           I spent 30 minutes with the patient more than 50% of which was in counseling and coordination of care.

## 2018-09-13 ENCOUNTER — ONCOLOGY VISIT (OUTPATIENT)
Dept: ONCOLOGY | Facility: CLINIC | Age: 63
End: 2018-09-13
Attending: INTERNAL MEDICINE
Payer: COMMERCIAL

## 2018-09-13 ENCOUNTER — RADIANT APPOINTMENT (OUTPATIENT)
Dept: CARDIOLOGY | Facility: CLINIC | Age: 63
End: 2018-09-13
Attending: INTERNAL MEDICINE
Payer: COMMERCIAL

## 2018-09-13 VITALS
OXYGEN SATURATION: 97 % | RESPIRATION RATE: 18 BRPM | WEIGHT: 182.4 LBS | TEMPERATURE: 98.2 F | BODY MASS INDEX: 33.35 KG/M2 | HEART RATE: 72 BPM | SYSTOLIC BLOOD PRESSURE: 108 MMHG | DIASTOLIC BLOOD PRESSURE: 68 MMHG

## 2018-09-13 DIAGNOSIS — C50.912 MALIGNANT NEOPLASM OF LEFT FEMALE BREAST, UNSPECIFIED ESTROGEN RECEPTOR STATUS, UNSPECIFIED SITE OF BREAST (H): ICD-10-CM

## 2018-09-13 DIAGNOSIS — Z51.11 ENCOUNTER FOR ANTINEOPLASTIC CHEMOTHERAPY: ICD-10-CM

## 2018-09-13 DIAGNOSIS — C50.912 MALIGNANT NEOPLASM OF LEFT FEMALE BREAST, UNSPECIFIED ESTROGEN RECEPTOR STATUS, UNSPECIFIED SITE OF BREAST (H): Primary | ICD-10-CM

## 2018-09-13 LAB
ALBUMIN SERPL-MCNC: 3.2 G/DL (ref 3.4–5)
ALP SERPL-CCNC: 36 U/L (ref 40–150)
ALT SERPL W P-5'-P-CCNC: 25 U/L (ref 0–50)
ANION GAP SERPL CALCULATED.3IONS-SCNC: 5 MMOL/L (ref 3–14)
AST SERPL W P-5'-P-CCNC: 21 U/L (ref 0–45)
BASOPHILS # BLD AUTO: 0 10E9/L (ref 0–0.2)
BASOPHILS NFR BLD AUTO: 0.4 %
BILIRUB SERPL-MCNC: 0.2 MG/DL (ref 0.2–1.3)
BUN SERPL-MCNC: 15 MG/DL (ref 7–30)
CALCIUM SERPL-MCNC: 8.8 MG/DL (ref 8.5–10.1)
CANCER AG27-29 SERPL-ACNC: 12 U/ML (ref 0–39)
CEA SERPL-MCNC: 0.6 UG/L (ref 0–2.5)
CHLORIDE SERPL-SCNC: 107 MMOL/L (ref 94–109)
CO2 SERPL-SCNC: 28 MMOL/L (ref 20–32)
CREAT SERPL-MCNC: 0.78 MG/DL (ref 0.52–1.04)
DIFFERENTIAL METHOD BLD: ABNORMAL
EOSINOPHIL # BLD AUTO: 0.3 10E9/L (ref 0–0.7)
EOSINOPHIL NFR BLD AUTO: 3.5 %
ERYTHROCYTE [DISTWIDTH] IN BLOOD BY AUTOMATED COUNT: 13.4 % (ref 10–15)
GFR SERPL CREATININE-BSD FRML MDRD: 74 ML/MIN/1.7M2
GLUCOSE SERPL-MCNC: 80 MG/DL (ref 70–99)
HCT VFR BLD AUTO: 37.2 % (ref 35–47)
HGB BLD-MCNC: 11.6 G/DL (ref 11.7–15.7)
IMM GRANULOCYTES # BLD: 0 10E9/L (ref 0–0.4)
IMM GRANULOCYTES NFR BLD: 0.1 %
LYMPHOCYTES # BLD AUTO: 3 10E9/L (ref 0.8–5.3)
LYMPHOCYTES NFR BLD AUTO: 41 %
MCH RBC QN AUTO: 29.9 PG (ref 26.5–33)
MCHC RBC AUTO-ENTMCNC: 31.2 G/DL (ref 31.5–36.5)
MCV RBC AUTO: 96 FL (ref 78–100)
MONOCYTES # BLD AUTO: 0.5 10E9/L (ref 0–1.3)
MONOCYTES NFR BLD AUTO: 7 %
NEUTROPHILS # BLD AUTO: 3.5 10E9/L (ref 1.6–8.3)
NEUTROPHILS NFR BLD AUTO: 48 %
NRBC # BLD AUTO: 0 10*3/UL
NRBC BLD AUTO-RTO: 0 /100
PLATELET # BLD AUTO: 203 10E9/L (ref 150–450)
POTASSIUM SERPL-SCNC: 4 MMOL/L (ref 3.4–5.3)
PROT SERPL-MCNC: 7.4 G/DL (ref 6.8–8.8)
RBC # BLD AUTO: 3.88 10E12/L (ref 3.8–5.2)
SODIUM SERPL-SCNC: 140 MMOL/L (ref 133–144)
WBC # BLD AUTO: 7.4 10E9/L (ref 4–11)

## 2018-09-13 PROCEDURE — 86300 IMMUNOASSAY TUMOR CA 15-3: CPT | Performed by: INTERNAL MEDICINE

## 2018-09-13 PROCEDURE — 96413 CHEMO IV INFUSION 1 HR: CPT

## 2018-09-13 PROCEDURE — 99213 OFFICE O/P EST LOW 20 MIN: CPT | Mod: ZP | Performed by: INTERNAL MEDICINE

## 2018-09-13 PROCEDURE — 25000128 H RX IP 250 OP 636: Mod: ZF | Performed by: INTERNAL MEDICINE

## 2018-09-13 PROCEDURE — G0463 HOSPITAL OUTPT CLINIC VISIT: HCPCS | Mod: ZF

## 2018-09-13 PROCEDURE — 80053 COMPREHEN METABOLIC PANEL: CPT | Performed by: INTERNAL MEDICINE

## 2018-09-13 PROCEDURE — 82378 CARCINOEMBRYONIC ANTIGEN: CPT | Performed by: INTERNAL MEDICINE

## 2018-09-13 PROCEDURE — 85025 COMPLETE CBC W/AUTO DIFF WBC: CPT | Performed by: INTERNAL MEDICINE

## 2018-09-13 PROCEDURE — 40000557 ZZH STATISTIC PORT-A-CATH ACCESS/FLUSHING

## 2018-09-13 RX ORDER — DIPHENHYDRAMINE HCL 25 MG
50 CAPSULE ORAL ONCE
Status: CANCELLED
Start: 2018-09-13

## 2018-09-13 RX ORDER — LORAZEPAM 2 MG/ML
0.5 INJECTION INTRAMUSCULAR EVERY 4 HOURS PRN
Status: CANCELLED
Start: 2018-09-13

## 2018-09-13 RX ORDER — ALBUTEROL SULFATE 0.83 MG/ML
2.5 SOLUTION RESPIRATORY (INHALATION)
Status: CANCELLED | OUTPATIENT
Start: 2018-09-13

## 2018-09-13 RX ORDER — EPINEPHRINE 0.3 MG/.3ML
0.3 INJECTION SUBCUTANEOUS EVERY 5 MIN PRN
Status: CANCELLED | OUTPATIENT
Start: 2018-09-13

## 2018-09-13 RX ORDER — EPINEPHRINE 1 MG/ML
0.3 INJECTION, SOLUTION INTRAMUSCULAR; SUBCUTANEOUS EVERY 5 MIN PRN
Status: CANCELLED | OUTPATIENT
Start: 2018-09-13

## 2018-09-13 RX ORDER — HEPARIN SODIUM (PORCINE) LOCK FLUSH IV SOLN 100 UNIT/ML 100 UNIT/ML
5 SOLUTION INTRAVENOUS DAILY PRN
Status: DISCONTINUED | OUTPATIENT
Start: 2018-09-13 | End: 2018-09-15 | Stop reason: HOSPADM

## 2018-09-13 RX ORDER — ACETAMINOPHEN 325 MG/1
650 TABLET ORAL
Status: CANCELLED | OUTPATIENT
Start: 2018-09-13

## 2018-09-13 RX ORDER — DIPHENHYDRAMINE HYDROCHLORIDE 50 MG/ML
50 INJECTION INTRAMUSCULAR; INTRAVENOUS
Status: CANCELLED
Start: 2018-09-13

## 2018-09-13 RX ORDER — ALBUTEROL SULFATE 90 UG/1
1-2 AEROSOL, METERED RESPIRATORY (INHALATION)
Status: CANCELLED
Start: 2018-09-13

## 2018-09-13 RX ORDER — MEPERIDINE HYDROCHLORIDE 25 MG/ML
25 INJECTION INTRAMUSCULAR; INTRAVENOUS; SUBCUTANEOUS EVERY 30 MIN PRN
Status: CANCELLED | OUTPATIENT
Start: 2018-09-13

## 2018-09-13 RX ORDER — SODIUM CHLORIDE 9 MG/ML
1000 INJECTION, SOLUTION INTRAVENOUS CONTINUOUS PRN
Status: CANCELLED
Start: 2018-09-13

## 2018-09-13 RX ORDER — HEPARIN SODIUM (PORCINE) LOCK FLUSH IV SOLN 100 UNIT/ML 100 UNIT/ML
5 SOLUTION INTRAVENOUS EVERY 8 HOURS
Status: DISCONTINUED | OUTPATIENT
Start: 2018-09-13 | End: 2018-09-13 | Stop reason: HOSPADM

## 2018-09-13 RX ORDER — HEPARIN SODIUM (PORCINE) LOCK FLUSH IV SOLN 100 UNIT/ML 100 UNIT/ML
5 SOLUTION INTRAVENOUS EVERY 8 HOURS
Status: CANCELLED | OUTPATIENT
Start: 2018-09-13

## 2018-09-13 RX ORDER — METHYLPREDNISOLONE SODIUM SUCCINATE 125 MG/2ML
125 INJECTION, POWDER, LYOPHILIZED, FOR SOLUTION INTRAMUSCULAR; INTRAVENOUS
Status: CANCELLED
Start: 2018-09-13

## 2018-09-13 RX ADMIN — SODIUM CHLORIDE 250 ML: 9 INJECTION, SOLUTION INTRAVENOUS at 15:13

## 2018-09-13 RX ADMIN — Medication 6 ML: at 12:15

## 2018-09-13 RX ADMIN — Medication 5 ML: at 12:40

## 2018-09-13 RX ADMIN — Medication 5 ML: at 15:48

## 2018-09-13 RX ADMIN — TRASTUZUMAB 450 MG: 150 INJECTION, POWDER, LYOPHILIZED, FOR SOLUTION INTRAVENOUS at 15:16

## 2018-09-13 ASSESSMENT — PAIN SCALES - GENERAL: PAINLEVEL: NO PAIN (0)

## 2018-09-13 NOTE — LETTER
9/13/2018       RE: Hoda Brush  [address] [id number]     Dear Colleague,    Thank you for referring your patient, Hoda Brush, to the [facility]. Please see a copy of my visit note below.    Hoda is seen with a Tuvaluan . The patient is a refugee from [facility] and is here for continuation of care for her metastatic ER+HER2- breast cancer.  She is a Mormon refugee from [facility] and was seen in clinic with her daughter.  The only data we have from [facility] is an English translation of her records.       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  On 02/11/2014, she was seen by an oncologist.  The clinical staging of T4b N2 M1 was noted.   Her breast cancer was on the left side. There was no right breast cancer, confirmed by the patient and her daughter, correcting a possible error in the translated records.  ER was positive in 100% of the cells, SD at 14% and HER2 was 3+ positive.  It appears that this histopathologic information is on the mastectomy specimen. She underwent an MRI for staging of presumptive bone metastases which was performed 03/02/2014.  There were skeletal metastases in the thoracic vertebrae at 12, L1, L3, L5 and S1 vertebral body, ranging in size from 0.7-3.0 cm in size.  Ischial and right femur were also involved, as well as a large iliac mass measuring about 15 cm in the uterus. 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   Radiation Oncology consultation was performed 03/11/2014, and she was given radiation in 1 dose of 6 Gy to the right iliac lesion.        With the initial diagnosis, she initiated treatment with 6 cycles of CAF neoadjuvant chemotherapy 02/14, 07/07, 03/28, 04/18/14, 05/08 and 05/29/14. She also received monthly zoledronic acid.  She then underwent a modified left mastectomy of Palacios type and left lymphadenoectomy on 06/27/2014.    Pathologic examination showed number at [facility] was [id number] showed infiltrative grade 2 ductal cancer with 6 level 1 metastatic lymph nodes and 3 level 2 metastatic lymph nodes.  The differentiation was intermediate.  The tumor was ER positive 70% of the cells, NE positive in 40% of the cells and HER2 was 3+ by immunohistochemistry and the Ki-67 labeling index was 20%. Her staging after surgery was stage IV, pT4b N2 M1.  I don't see a biopsy of the skeletal metastases.  She then had continuation with chemotherapy with 4 cycles of Herceptin and taxane with monthly zoledronic acid.  Tamoxifen was initiated.  She then had two years of Herceptin and a decision was made not to continue further Herceptin.  She continued on zoledronic acid every 3 months. She was clinically stable. [place] then [place] new refugee from [facility].  She arrived last month, established [place] residency and now seeks further oncology care.       TREATMENT HISTORY:  A. Initial diagnosis with metastatic breast cancer in [facility].  Neoadjuvant CAF x 6.   B. Left mastectomy. Left axillary node dissection.  C.  Radiation in 1 dose to R iliac region. g Gy.  C. Herceptin for 2 years taxane for a prescribed course then stopped, monthly zoledronic acid.  She had 2 years of Herceptin with tamoxifen added after chemotherapy.  D.  Tamoxifen alone and zoledronic acid every 3 months.  [place]  [place]  We restarted Herceptin every 3 weeks and continued tamoxifen. Bone targeted agent changed to denosumab every 9 weeks.        FOLLOWUP NOTE      INTERVAL HISTORY:  Hoda returns to clinic and has been feeling generally well.  She has no pain, no fatigue, no depression, no anxiety.      REVIEW OF SYSTEMS:  A 10-point review of systems is entirely negative.  She is eating a healthful diet.  She is walking more than 150 minutes a week.  She takes calcium and vitamin D.      PHYSICAL EXAMINATION:   VITAL SIGNS:  Blood pressure 108/68,  temperature 98.2, pulse 72, respirations 18, O2 sat 97% on room air and weight 82.7 kg.   GENERAL:  Hoda appeared generally well.  She has no alopecia.   HEENT:  Oropharynx is without lesions.   LYMPH:  There is no palpable cervical, supraclavicular, subclavicular or axillary lymphadenopathy.   BREASTS:  Exam was not performed today.   LUNGS:  Clear to percussion and auscultation.   HEART:  Regular rate and rhythm, S1, S2.   ABDOMEN:  Soft and nontender, without hepatosplenomegaly.   EXTREMITIES:  Without edema.   PSYCHIATRIC:  Mood and affect were normal.      LABORATORY DATA:  CBC showed WBC of 7.4, hemoglobin 11.6, platelets 203,000.  The CMP showed an albumin of 3.2.      IMAGING:  Echocardiogram results are pending.      ASSESSMENT AND PLAN:     1.  Hoda Brush is a 63-year-old woman with a history of ER-positive, MN-positive, HER2-positive breast cancer.  She is from [facility], formerly from [facility], and came [place] the [place]  She lives with her daughter and is here for continuation of every 3-week Herceptin and tamoxifen.  Her tumor is ER positive, MN positive, and HER2 positive.  She had metastatic disease at the time of presentation with bone-only metastases by report.  She presented with metastatic disease in 02/2014, staging was initially stage IV, T4 N2 M1 invasive ductal carcinoma of the left breast.  She had a right hip metastasis.  She underwent neoadjuvant CAF, left mastectomy, left axillary lymph node dissection and radiation.  She had radiation of the right iliac region where she had metastatic disease.  Pathology from [facility] shows the tumor to be positive for ER in 75% of cells, MN positive in 40% of the cells, and HER2 was 3+ positive by immunohistochemistry.  Ki-67 was 20%.  She has no evidence of nonbone metastatic disease on her PET/CT from 08/2017.   2.  Insomnia, has been responsive to mirtazapine.  Restaging will be performed on 10/01.   3.  Discussion of diet and exercise.  She  is eating a healthful diet and she is exercising.   4.  Discussion of bone health.  She will continue with calcium and vitamin D.   5.  Discussion of duration of treatment with tamoxifen.  I discussed that tamoxifen could be continued indefinitely, that we have safety data up to 10 years and that it is possible to consider raloxifene after that if her disease is stable.  She has no vaginal bleeding and no evidence of endometrial issues at this time.  I do think that yearly GYN followup is reasonable.   Gynecology follow up planned for later this year.   6.  Followup.  We will see Hoda in followup in our clinic in 3 weeks with Jossie Sparrow.      Thank you for allowing us to continue to participate in Hoda's care.         Lisandro Aguiar MD       [facility]           I spent 30 minutes with the patient more than 50% of which was in counseling and coordination of care.     Again, thank you for allowing me to participate in the care of your patient.      Sincerely,    Lisandro Aguiar MD

## 2018-09-13 NOTE — NURSING NOTE
Powerport already accessed port, labs collected and sent, line flushed with NS and heparin, VS taken and pt checked in for next appt.  Rosa Machado

## 2018-09-13 NOTE — MR AVS SNAPSHOT
After Visit Summary   9/13/2018    Hoda Brush    MRN: [id number]           Patient Information     Date Of Birth          1955        Visit Information        Provider Department      9/13/2018 2:30 PM [place] [employer];  17 ATC;  ONCOLOGY INFUSION [facility]        Today's Diagnoses     Malignant neoplasm of left female breast, unspecified estrogen receptor status, unspecified site of breast (H)    -  1      Care Instructions    Contact Numbers    [facility] Main Line: [phone]  [facility] Triage and After Hours Nurse Line:  [phone]    Please call the [facility] nurse triage or the after hours nurse line if you experience a temperature greater than or equal to 100.5, shaking chills, have uncontrolled nausea, vomiting and/or diarrhea, dizziness, lightheadedness, shortness of breath, chest pain, bleeding, unexplained bruising, or if you have any other new/concerning symptoms, questions or concerns.     If you are having any concerning symptoms or wish to speak to a provider before your next infusion visit, please call your care coordinator or triage to notify them so we can adequately serve you.     If you need a refill on a narcotic prescription or other medication, please call triage before your infusion appointment.           September 2018 Sunday Monday Tuesday Wednesday Thursday Friday Saturday                                 1       2     3     4     5     6     7     8       9     10     11     12     13     ECH COMPLETE   11:00 AM   (60 min.)   ECHCR2   [facility]   11:15 AM   (30 min.)   [facility] LAB DRAW   [facility] Lab Draw     [facility] RETURN    1:45 PM   (90 min.)   Lisandro Aguiar MD   [facility] ONC INFUSION 60    2:30 PM   (90 min.)    ONCOLOGY INFUSION   [facility] 14     15       16     17     18     19     20     21     22       23     24      25     26     27     28     29       30 October 2018 Sunday Monday Tuesday Wednesday Thursday Friday Saturday        1     CT CHEST/ABDOMEN/PELVIS W    9:40 AM   (20 min.)   UCCT2   [facility] CT 2     [facility] LAB DRAW   10:00 AM   (15 min.)    MASONIC LAB DRAW   [facility] Mason[facility] Lab Draw     [facility] RETURN   10:15 AM   (50 min.)   Jossie Sparrow PA   [facility]     [facility] ONC INFUSION 60   12:00 PM   (60 min.)    ONCOLOGY INFUSION   [facility] 3     4     5     6       7     8     9     10     11     12     13       14     15     16     17     18     19     20       21     22     23     [facility] LAB DRAW    2:00 PM   (15 min.)    MASONIC LAB DRAW   [facility] Lab Draw     [facility] ONC INFUSION 60    2:30 PM   (60 min.)    ONCOLOGY INFUSION   [facility] 24     25     26     27       28     29     30     31                                Recent Results (from the past 24 hour(s))   CBC with platelets differential    Collection Time: 09/13/18 12:39 PM   Result Value Ref Range    WBC 7.4 4.0 - 11.0 10e9/L    RBC Count 3.88 3.8 - 5.2 10e12/L    Hemoglobin 11.6 (L) 11.7 - 15.7 g/dL    Hematocrit 37.2 35.0 - 47.0 %    MCV 96 78 - 100 fl    MCH 29.9 26.5 - 33.0 pg    MCHC 31.2 (L) 31.5 - 36.5 g/dL    RDW 13.4 10.0 - 15.0 %    Platelet Count 203 150 - 450 10e9/L    Diff Method Automated Method     % Neutrophils 48.0 %    % Lymphocytes 41.0 %    % Monocytes 7.0 %    % Eosinophils 3.5 %    % Basophils 0.4 %    % Immature Granulocytes 0.1 %    Nucleated RBCs 0 0 /100    Absolute Neutrophil 3.5 1.6 - 8.3 10e9/L    Absolute Lymphocytes 3.0 0.8 - 5.3 10e9/L    Absolute Monocytes 0.5 0.0 - 1.3 10e9/L    Absolute Eosinophils 0.3 0.0 - 0.7 10e9/L    Absolute Basophils 0.0 0.0 - 0.2 10e9/L    Abs Immature Granulocytes 0.0 0 - 0.4 10e9/L    Absolute Nucleated RBC 0.0    Comprehensive metabolic panel     Collection Time: 09/13/18 12:39 PM   Result Value Ref Range    Sodium 140 133 - 144 mmol/L    Potassium 4.0 3.4 - 5.3 mmol/L    Chloride 107 94 - 109 mmol/L    Carbon Dioxide 28 20 - 32 mmol/L    Anion Gap 5 3 - 14 mmol/L    Glucose 80 70 - 99 mg/dL    Urea Nitrogen 15 7 - 30 mg/dL    Creatinine 0.78 0.52 - 1.04 mg/dL    GFR Estimate 74 >60 mL/min/1.7m2    GFR Estimate If Black >90 >60 mL/min/1.7m2    Calcium 8.8 8.5 - 10.1 mg/dL    Bilirubin Total 0.2 0.2 - 1.3 mg/dL    Albumin 3.2 (L) 3.4 - 5.0 g/dL    Protein Total 7.4 6.8 - 8.8 g/dL    Alkaline Phosphatase 36 (L) 40 - 150 U/L    ALT 25 0 - 50 U/L    AST 21 0 - 45 U/L                 Follow-ups after your visit        Your next 10 appointments already scheduled     Oct 01, 2018  9:40 AM CDT   CT CHEST/ABDOMEN/PELVIS W CONTRAST with UCCT2   [facility] CT ([facility])    [address] [address] [phone]           How do I prepare for my exam? (Food and drink instructions) To prepare: Do not eat or drink for 2 hours before your exam. If you need to take medicine, you may take it with small sips of water. (We may ask you to take liquid medicine as well.)  How do I prepare for my exam? (Other instructions) Please arrive 30 minutes early for your CT.  Once in the department you might be asked to drink water 15-20 minutes prior to your exam.  If indicated you may be asked to drink an oral contrast in advance of your CT.  If this is the case, the imaging team will let you know or be in contact with you prior to your appointment  Patients over 70 or patients with diabetes or kidney problems: If you haven t had a blood test (creatinine test) within the last 30 days, the Cardiologist/Radiologist may require you to get this test prior to your exam.  If you have diabetes:  Continue to take your metformin medication on the day of your exam  What should I wear: Please wear loose clothing, such as a  sweat suit or jogging clothes. Avoid snaps, zippers and other metal. We may ask you to undress and put on a hospital gown.  How long does the exam take: Most scans take less than 20 minutes.  What should I bring: Please bring any scans or X-rays taken at other hospitals, if similar tests were done. Also bring a list of your medicines, including vitamins, minerals and over-the-counter drugs. It is safest to leave personal items at home.  Do I need a : No  is needed.  What do I need to tell my doctor? Be sure to tell your doctor: * If you have any allergies. * If there s any chance you are pregnant. * If you are breastfeeding.  What should I do after the exam: No restrictions, You may resume normal activities.  What is this test: A CT (computed tomography) scan is a series of pictures that allows us to look inside your body. The scanner creates images of the body in cross sections, much like slices of bread. This helps us see any problems more clearly. You may receive contrast (X-ray dye) before or during your scan. You will be asked to drink the contrast.  Who should I call with questions: If you have any questions, please call the Imaging Department where you will have your exam. Directions, parking instructions, and other information is available on our website, [email or web address].            Oct 02, 2018 10:00 AM CDT   [facility] Lab Draw with UC [facility] LAB DRAW   [facility] Lab Draw ([facility])    [address] [place] [address]  [address]  [facility] [address]   [phone]            Oct 02, 2018 10:30 AM CDT   (Arrive by 10:15 AM)   Return Visit with SEMAJ Eddy   [facility] ([facility])    [address] [place] [address]  [address]  [facility] [address]   [phone]            Oct 02, 2018 12:00 PM CDT   Infusion 60 with RASHAAD ONCOLOGY INFUSION, UC 28 ATC   [facility] ([facility])     [address]  [address]  [facility] [address]   [phone]            Oct 23, 2018  2:00 PM CDT   M[facility]ic Lab Draw with UC MASONIC LAB DRAW   [facility] Lab Draw ([facility])    [address] [place] [address]  [address]  [facility] [address]   [phone]            Oct 23, 2018  2:30 PM CDT   Infusion 60 with UC ONCOLOGY INFUSION, UC 29 ATC   [facility] ([facility])    [address] [place] [address]  [address]  [facility] [address]   [phone]            Nov 13, 2018 12:30 PM CST   [facility] Lab Draw with UC [facility] LAB DRAW   [facility] Lab Draw ([facility])    [address] [place] [address]  [address]  [facility] [address]   [phone]            Nov 13, 2018  1:00 PM CST   (Arrive by 12:45 PM)   Return Visit with Lisandro Aguiar MD   [facility] ([facility])    [address] [place] [address]  [address]  [facility] [address]   [phone]              Who to contact     If you have questions or need follow up information about today's clinic visit or your schedule please contact [facility] directly at [phone].  Normal or non-critical lab and imaging results will be communicated to you by brettapprovedhart, letter or phone within 4 business days after the clinic has received the results. If you do not hear from us within 7 days, please contact the clinic through brettapprovedhart or phone. If you have a critical or abnormal lab result, we will notify you by phone as soon as possible.  Submit refill requests through [employer] or call your pharmacy and they will forward the refill request to us. Please allow 3 business days for your refill to be completed.          Additional Information About Your Visit        [employer] Information     [employer] gives you secure access to your electronic health record. If you see a primary care provider, you can also send  messages to your care team and make appointments. If you have questions, please call your primary care clinic.  If you do not have a primary care provider, please call [phone] and they will assist you.        Care EveryWhere ID     This is your Care EveryWhere ID. This could be used by other organizations to access your [place] medical records  [id number]         Blood Pressure from Last 3 Encounters:   09/13/18 108/68   08/22/18 120/63   08/01/18 125/78    Weight from Last 3 Encounters:   09/13/18 82.7 kg (182 lb 6.4 oz)   08/22/18 82.8 kg (182 lb 9.6 oz)   08/01/18 82.3 kg (181 lb 8 oz)              We Performed the Following     CA 27.29 Breast tumor marker     CBC with platelets differential     CEA     Comprehensive metabolic panel          Today's Medication Changes          These changes are accurate as of 9/13/18  3:23 PM.  If you have any questions, ask your nurse or doctor.               Stop taking these medicines if you haven't already. Please contact your care team if you have questions.     albuterol 108 (90 Base) MCG/ACT inhaler   Commonly known as:  PROAIR HFA/PROVENTIL HFA/VENTOLIN HFA   Stopped by:  Lisandro Aguiar MD           benzonatate 200 MG capsule   Commonly known as:  TESSALON   Stopped by:  Lisandro Aguiar MD           guaiFENesin-codeine 100-10 MG/5ML Soln solution   Commonly known as:  ROBITUSSIN AC   Stopped by:  Lisandro Aguiar MD                    Primary Care Provider Fax #    Physician No Ref-Primary [phone]       No address on file        Equal Access to Services     NOE VAZQUEZ : Hadii kamran ku hadasho Soomaali, waaxda luqadaha, qaybta kaalmada adeegyateo, alexa beltran. So [facility] [phone].    ATENCIÓN: Si habla español, tiene a lagunas disposición servicios gratuitos de asistencia lingüística. Llame al [phone].    We comply with applicable federal civil rights laws and [place] laws. We do not discriminate on  the basis of race, color, national origin, age, disability, sex, sexual orientation, or gender identity.            Thank you!     Thank you for choosing [facility]  for your care. Our goal is always to provide you with excellent care. Hearing back from our patients is one way we can continue to improve our services. Please take a few minutes to complete the written survey that you may receive in the mail after your visit with us. Thank you!             Your Updated Medication List - Protect others around you: Learn how to safely use, store and throw away your medicines at www.disposemymeds.org.          This list is accurate as of 9/13/18  3:23 PM.  Always use your most recent med list.                   Brand Name Dispense Instructions for use Diagnosis    mirtazapine 15 MG tablet    REMERON    90 tablet    Take 0.5 tablets (7.5 mg) by mouth At Bedtime    Persistent insomnia       order for DME     1 each    L UE class 2 compression sleeve and gauntlet Night garment Bandaging supplies    Lymphedema of left upper extremity       tamoxifen 20 MG tablet    NOLVADEX    90 tablet    Take 1 tablet (20 mg) by mouth daily    Cancer of central portion of left breast (H)       VITAMIN D (CHOLECALCIFEROL) PO      Take 1,000 Units by mouth daily

## 2018-09-13 NOTE — PROGRESS NOTES
Infusion Nursing Note:  Hoda Brush presents today for Cycle 18 Day 1 Herceptin.    Patient seen by provider today: Yes: Dr. Lisandro Aguiar MD    Intravenous Access:  Implanted Port.    Treatment Conditions:  Lab Results   Component Value Date    HGB 11.6 09/13/2018     Lab Results   Component Value Date    WBC 7.4 09/13/2018      Lab Results   Component Value Date    ANEU 3.5 09/13/2018     Lab Results   Component Value Date     09/13/2018      Lab Results   Component Value Date     09/13/2018                   Lab Results   Component Value Date    POTASSIUM 4.0 09/13/2018           No results found for: MAG         Lab Results   Component Value Date    CR 0.78 09/13/2018                   Lab Results   Component Value Date    TAYLER 8.8 09/13/2018                Lab Results   Component Value Date    BILITOTAL 0.2 09/13/2018           Lab Results   Component Value Date    ALBUMIN 3.2 09/13/2018                    Lab Results   Component Value Date    ALT 25 09/13/2018           Lab Results   Component Value Date    AST 21 09/13/2018       Results reviewed, labs MET treatment parameters, ok to proceed with treatment.  ECHO/MUGA completed 9/13/18  EF 59%.    Post Infusion Assessment:  Patient tolerated infusion without incident.  Blood return noted pre and post infusion.  Access discontinued per protocol.    Discharge Plan:   Patient declined prescription refills.  Discharge instructions reviewed with: Patient.  Patient and/or family verbalized understanding of discharge instructions and all questions answered.  Copy of AVS reviewed with patient and/or family.  Patient will return 10/2/18 for next appointment.  Patient discharged in stable condition accompanied by: self and .  Departure Mode: Ambulatory.    ROLA BARBA RN

## 2018-09-13 NOTE — PATIENT INSTRUCTIONS
Contact Numbers    [facility] Main Line: [phone]  [facility] Triage and After Hours Nurse Line:  [phone]    Please call the [facility] nurse triage or the after hours nurse line if you experience a temperature greater than or equal to 100.5, shaking chills, have uncontrolled nausea, vomiting and/or diarrhea, dizziness, lightheadedness, shortness of breath, chest pain, bleeding, unexplained bruising, or if you have any other new/concerning symptoms, questions or concerns.     If you are having any concerning symptoms or wish to speak to a provider before your next infusion visit, please call your care coordinator or triage to notify them so we can adequately serve you.     If you need a refill on a narcotic prescription or other medication, please call triage before your infusion appointment.           September 2018 Sunday Monday Tuesday Wednesday Thursday Friday Saturday                                 1       2     3     4     5     6     7     8       9     10     11     12     13     ECH COMPLETE   11:00 AM   (60 min.)   ECHCR2   [facility] LAB DRAW   11:15 AM   (30 min.)    MASONIC LAB DRAW   [facility] Lab Draw     [facility] RETURN    1:45 PM   (90 min.)   Lisandro Aguiar MD   [facility] ONC INFUSION 60    2:30 PM   (90 min.)    ONCOLOGY INFUSION   [facility] 14     15       16     17     18     19     20     21     22       23     24     25     26     27     28     29       30                                              October 2018 Sunday Monday Tuesday Wednesday Thursday Friday Saturday        1     CT CHEST/ABDOMEN/PELVIS W    9:40 AM   (20 min.)   UCCT2   [facility] CT 2     [facility] LAB DRAW   10:00 AM   (15 min.)    MASONIC LAB DRAW   [facility] Lab Draw     [facility] RETURN   10:15 AM   (50 min.)   Jossie Sparrow PA   [facility] ONC INFUSION 60   12:00 PM   (60 min.)     ONCOLOGY INFUSION   [facility] 3     4     5     6       7     8     9     10     11     12     13       14     15     16     17     18     19     20       21     22     23     [facility] LAB DRAW    2:00 PM   (15 min.)   [facility] LAB DRAW   [facility] Lab Draw     [facility] ONC INFUSION 60    2:30 PM   (60 min.)    ONCOLOGY INFUSION   [facility] 24     25     26     27       28     29     30     31                                Recent Results (from the past 24 hour(s))   CBC with platelets differential    Collection Time: 09/13/18 12:39 PM   Result Value Ref Range    WBC 7.4 4.0 - 11.0 10e9/L    RBC Count 3.88 3.8 - 5.2 10e12/L    Hemoglobin 11.6 (L) 11.7 - 15.7 g/dL    Hematocrit 37.2 35.0 - 47.0 %    MCV 96 78 - 100 fl    MCH 29.9 26.5 - 33.0 pg    MCHC 31.2 (L) 31.5 - 36.5 g/dL    RDW 13.4 10.0 - 15.0 %    Platelet Count 203 150 - 450 10e9/L    Diff Method Automated Method     % Neutrophils 48.0 %    % Lymphocytes 41.0 %    % Monocytes 7.0 %    % Eosinophils 3.5 %    % Basophils 0.4 %    % Immature Granulocytes 0.1 %    Nucleated RBCs 0 0 /100    Absolute Neutrophil 3.5 1.6 - 8.3 10e9/L    Absolute Lymphocytes 3.0 0.8 - 5.3 10e9/L    Absolute Monocytes 0.5 0.0 - 1.3 10e9/L    Absolute Eosinophils 0.3 0.0 - 0.7 10e9/L    Absolute Basophils 0.0 0.0 - 0.2 10e9/L    Abs Immature Granulocytes 0.0 0 - 0.4 10e9/L    Absolute Nucleated RBC 0.0    Comprehensive metabolic panel    Collection Time: 09/13/18 12:39 PM   Result Value Ref Range    Sodium 140 133 - 144 mmol/L    Potassium 4.0 3.4 - 5.3 mmol/L    Chloride 107 94 - 109 mmol/L    Carbon Dioxide 28 20 - 32 mmol/L    Anion Gap 5 3 - 14 mmol/L    Glucose 80 70 - 99 mg/dL    Urea Nitrogen 15 7 - 30 mg/dL    Creatinine 0.78 0.52 - 1.04 mg/dL    GFR Estimate 74 >60 mL/min/1.7m2    GFR Estimate If Black >90 >60 mL/min/1.7m2    Calcium 8.8 8.5 - 10.1 mg/dL    Bilirubin Total 0.2 0.2 - 1.3 mg/dL    Albumin 3.2 (L) 3.4 - 5.0  g/dL    Protein Total 7.4 6.8 - 8.8 g/dL    Alkaline Phosphatase 36 (L) 40 - 150 U/L    ALT 25 0 - 50 U/L    AST 21 0 - 45 U/L

## 2018-09-13 NOTE — NURSING NOTE
"Oncology Rooming Note    September 13, 2018 12:58 PM   Hoda Brush is a 63 year old female who presents for:    Chief Complaint   Patient presents with     Port Draw     port already accessed labs collected and sent, line flushed with NS and heparin. VS taken and pt checked in for next appt.      Oncology Clinic Visit     return - breast ca      Initial Vitals: /68  Pulse 72  Temp 98.2  F (36.8  C) (Oral)  Resp 18  Wt 82.7 kg (182 lb 6.4 oz)  SpO2 97%  BMI 33.35 kg/m2 Estimated body mass index is 33.35 kg/(m^2) as calculated from the following:    Height as of 8/22/18: 1.575 m (5' 2.01\").    Weight as of this encounter: 82.7 kg (182 lb 6.4 oz). Body surface area is 1.9 meters squared.  No Pain (0) Comment: Data Unavailable   No LMP recorded. Patient is postmenopausal.  Allergies reviewed: Yes  Medications reviewed: Yes    Medications: Medication refills not needed today.  Pharmacy name entered into Kwicr:    [id number] - [place], [place] - [address] [id number] - [place], [place] - [address] AT [facility] & [address] [place] [address] [id number] - [place], [place] - [address] AT  [address] [place] & [facility]    Clinical concerns: no new concerns      6 minutes for nursing intake (face to face time)     Isabel Mcmillan CMA            "

## 2018-09-13 NOTE — MR AVS SNAPSHOT
After Visit Summary   9/13/2018    Hoda Brush    MRN: [id number]           Patient Information     Date Of Birth          1955        Visit Information        Provider Department      9/13/2018 1:45 PM Lisandro Aguiar MD; [facility]        Today's Diagnoses     Malignant neoplasm of left female breast, unspecified estrogen receptor status, unspecified site of breast (H)           Follow-ups after your visit        Your next 10 appointments already scheduled     Oct 01, 2018  9:40 AM CDT   CT CHEST/ABDOMEN/PELVIS W CONTRAST with UCCT2   [facility] [place] [facility] ([facility])    [address]  1st Floor  [facility] [address] [phone]           How do I prepare for my exam? (Food and drink instructions) To prepare: Do not eat or drink for 2 hours before your exam. If you need to take medicine, you may take it with small sips of water. (We may ask you to take liquid medicine as well.)  How do I prepare for my exam? (Other instructions) Please arrive 30 minutes early for your CT.  Once in the department you might be asked to drink water 15-20 minutes prior to your exam.  If indicated you may be asked to drink an oral contrast in advance of your CT.  If this is the case, the imaging team will let you know or be in contact with you prior to your appointment  Patients over 70 or patients with diabetes or kidney problems: If you haven t had a blood test (creatinine test) within the last 30 days, the Cardiologist/Radiologist may require you to get this test prior to your exam.  If you have diabetes:  Continue to take your metformin medication on the day of your exam  What should I wear: Please wear loose clothing, such as a sweat suit or jogging clothes. Avoid snaps, zippers and other metal. We may ask you to undress and put on a hospital gown.  How long does the exam take: Most scans take less  than 20 minutes.  What should I bring: Please bring any scans or X-rays taken at other hospitals, if similar tests were done. Also bring a list of your medicines, including vitamins, minerals and over-the-counter drugs. It is safest to leave personal items at home.  Do I need a : No  is needed.  What do I need to tell my doctor? Be sure to tell your doctor: * If you have any allergies. * If there s any chance you are pregnant. * If you are breastfeeding.  What should I do after the exam: No restrictions, You may resume normal activities.  What is this test: A CT (computed tomography) scan is a series of pictures that allows us to look inside your body. The scanner creates images of the body in cross sections, much like slices of bread. This helps us see any problems more clearly. You may receive contrast (X-ray dye) before or during your scan. You will be asked to drink the contrast.  Who should I call with questions: If you have any questions, please call the Imaging Department where you will have your exam. Directions, parking instructions, and other information is available on our website, [email or web address].            Oct 02, 2018 10:00 AM CDT   [facility] Lab Draw with  [facility] LAB DRAW   [facility]    [address] [place] [address]  [address] [place] [address]   [phone]            Oct 02, 2018 10:30 AM CDT   (Arrive by 10:15 AM)   Return Visit with SEMAJ Eddy   [facility] ([facility])    [address] [place] [address]  [address]  [facility] [address]   [phone]            Oct 02, 2018 12:00 PM CDT   Infusion 60 with  ONCOLOGY INFUSION, UC 28 ATC   [facility] ([facility])    [address] [place] [address]  [address]  [facility] [address]   [phone]            Oct 23, 2018  2:00 PM CDT   [facility] Lab Draw with UC [facility] LAB DRAW   [facility]    [address]  [address]  [facility] [address]   [phone]            Oct 23, 2018  2:30 PM CDT   Infusion 60 with UC ONCOLOGY INFUSION, UC 29 ATC   [facility] ([facility])    [address]  [address]  [facility] [address]   [phone]            Nov 13, 2018 12:30 PM CST   Masonic Lab Draw with UC MASONIC LAB DRAW   [facility] Draw ([facility])    [address] [place] [address]  [address]  [facility] [address]   [phone]            Nov 13, 2018  1:00 PM CST   (Arrive by 12:45 PM)   Return Visit with Lisandro Aguiar MD   [facility] ([facility])    [address] [place] [address]  [address]  [facility] [address]   [phone]              Who to contact     If you have questions or need follow up information about today's clinic visit or your schedule please contact [facility] directly at [phone].  Normal or non-critical lab and imaging results will be communicated to you by MyChart, letter or phone within 4 business days after the clinic has received the results. If you do not hear from us within 7 days, please contact the clinic through [employer] or phone. If you have a critical or abnormal lab result, we will notify you by phone as soon as possible.  Submit refill requests through [employer] or call your pharmacy and they will forward the refill request to us. Please allow 3 business days for your refill to be completed.          Additional Information About Your Visit        MyChart Information     [employer] gives you secure access to your electronic health record. If you see a primary care provider, you can also send messages to your care team and make appointments. If you have questions, please call your primary care clinic.  If you do not have a primary care provider, please call  [phone] and they will assist you.        Care EveryWhere ID     This is your Care EveryWhere ID. This could be used by other organizations to access your [place] medical records  [id number]        Your Vitals Were     Pulse Temperature Respirations Pulse Oximetry BMI (Body Mass Index)       72 98.2  F (36.8  C) (Oral) 18 97% 33.35 kg/m2        Blood Pressure from Last 3 Encounters:   09/13/18 108/68   08/22/18 120/63   08/01/18 125/78    Weight from Last 3 Encounters:   09/13/18 82.7 kg (182 lb 6.4 oz)   08/22/18 82.8 kg (182 lb 9.6 oz)   08/01/18 82.3 kg (181 lb 8 oz)              Today, you had the following     No orders found for display         Today's Medication Changes          These changes are accurate as of 9/13/18 11:59 PM.  If you have any questions, ask your nurse or doctor.               Stop taking these medicines if you haven't already. Please contact your care team if you have questions.     albuterol 108 (90 Base) MCG/ACT inhaler   Commonly known as:  PROAIR HFA/PROVENTIL HFA/VENTOLIN HFA   Stopped by:  Lisandro Aguiar MD           benzonatate 200 MG capsule   Commonly known as:  TESSALON   Stopped by:  Lisandro Aguiar MD           guaiFENesin-codeine 100-10 MG/5ML Soln solution   Commonly known as:  ROBITUSSIN AC   Stopped by:  Lisandro Aguiar MD                    Primary Care Provider Fax #    Physician No Ref-Primary [phone]       No address on file        Equal Access to Services     NOE VAZQUEZ : Hadii kamran portilloo Sopepeali, waaxda luqadaha, qaybta kaalmada adeegyada, alexa beltran. So [facility] [phone].    ATENCIÓN: Si habla español, tiene a lagunas disposición servicios gratuitos de asistencia lingüística. Llame al [phone].    We comply with applicable federal civil rights laws and [place] laws. We do not discriminate on the basis of race, color, national origin, age, disability, sex, sexual orientation, or gender  identity.            Thank you!     Thank you for choosing [facility]  for your care. Our goal is always to provide you with excellent care. Hearing back from our patients is one way we can continue to improve our services. Please take a few minutes to complete the written survey that you may receive in the mail after your visit with us. Thank you!             Your Updated Medication List - Protect others around you: Learn how to safely use, store and throw away your medicines at www.disposemymeds.org.          This list is accurate as of 9/13/18 11:59 PM.  Always use your most recent med list.                   Brand Name Dispense Instructions for use Diagnosis    mirtazapine 15 MG tablet    REMERON    90 tablet    Take 0.5 tablets (7.5 mg) by mouth At Bedtime    Persistent insomnia       order for DME     1 each    L UE class 2 compression sleeve and gauntlet Night garment Bandaging supplies    Lymphedema of left upper extremity       tamoxifen 20 MG tablet    NOLVADEX    90 tablet    Take 1 tablet (20 mg) by mouth daily    Cancer of central portion of left breast (H)       VITAMIN D (CHOLECALCIFEROL) PO      Take 1,000 Units by mouth daily

## 2018-09-27 NOTE — PROGRESS NOTES
08/28/17 1700   Quick Adds   Quick Adds Certification   Rehab Discipline   Discipline PT   Type of Visit   Type of visit Initial Edema Evaluation       present Yes   Language Tongan  (Pt from [facility])   General Information   Start of care 08/28/17   Referring physician Dr. Lisandro Aguiar/Steffi Webb PA-C   Orders Evaluate and treat as indicated   Order date 08/24/17   Medical diagnosis Lymphedema, Edema, ROM   Onset of illness / date of surgery 06/27/14   Edema onset 07/14/14   Affected body parts LUE   Edema etiology Cancer with lymph node dissection;Chemo   Location - Cancer with lymph node dissection L axilla   Chemotherapy comments before her surgery in 2014   Edema etiology comments Pt thinks she noticed swelling about 3 weeks after surgery   Pertinent history of current problem (PT: include personal factors and/or comorbidities that impact the POC; OT: include additional occupational profile info) Pt diagnosed with breast cancer in 2014 in [facility].  She had 6 cycles of chemo therapy followed by L moderified mastectomy on 6/27/14 with ALND. pt with metastatic disease.  [place] about [place] and [place] now [place] medical [place] in [place].  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   Living environment comments Pt and  living with daughter and her family   General observations Pt thinks that she has 1 infection in the last 1/2 year. She took antibiotics and that helped.   Fall Screening   Fall screen completed by PT   Per patient, fall 2 or more times in past year? No   Per patient, fall with injury  "in past year? No   Is patient a fall risk? No   System Outcome Measures   Outcome Measures Lymphedema   Lymphedema Life Impact Scale (score range 0-72). A higher score indicates greater impairment. (Not administered due to language barrier)   Subjective Report   Patient report of symptoms \"It is a little bigger, that's it.\"  Pt reports what sounds like an infection about 6 months ago.  Pt denies pain, heaviness, ache.    Precautions   Precautions Active Cancer   Precautions comments Pt with metastatic disease. She is aware of bone mets, had full body PET today   Patient / Family Goals   Patient / family goals statement Pt would like to see results of her biopsy before scheduling more therapy.  She is interested in fitter information and knows to wait until she decides if she will be able to do therapy before going to be fit.   Pain   Patient currently in pain No   Cognitive Status   Orientation Orientation to person, place and time   Level of consciousness Alert   Follows commands and answers questions 100% of the time   Personal safety and judgement Intact   Memory Intact   Edema Exam / Assessment   Skin condition Dryness;Non-pitting   Skin condition comments no erythema, increased volume seen visually from 8cm above wrist to just above the elbow. Swelling soft in places and more firm on the back of forearm. No pitting   Stemmer sign Negative   Ulceration No   Girth Measurements   Girth Measurements Refer to separate girth measurement flowsheet   Volume UE   Right UE (mL) 2409.62   Left UE (mL) 2694.82   UE volume comparison LUE volume greater than RUE volume   % difference 11.8  (Pt is R handed)   Range of Motion   ROM No deficits were identified   ROM comments Specific ROM assessment not completed today.  Pt does have MFR on referral, will further assess   Strength   Strength No deficits were identified   Strength comments Gross screen of B UE. Good strength   Posture   Posture Forward head position   Palpation "   Palpation no pain   Activities of Daily Living   Activities of Daily Living independent   Bed Mobility   Bed mobility independent   Transfers   Transfers independent   Gait / Locomotion   Gait / Locomotion independent   Sensory   Sensory perception comments No deficits   Coordination   Coordination Gross motor coordination appropriate   Muscle Tone   Muscle tone No deficits were identified   Planned Edema Interventions   Planned edema interventions Manual lymph drainage;Gradient compression bandaging;Fit for compression garment;Exercises;Precautions to prevent infection / exacerbation;Education;Manual therapy;ADL training;Soft tissue mobilization;Home management program development;Myofascial release   Clinical Impression   Criteria for skilled therapeutic intervention met Yes   Therapy diagnosis Lymphedema   Influenced by the following impairments / conditions Stage 2   Functional limitations due to impairments / conditions Risk of infection, risk of worsening edema leading to pain and disablity   Clinical Presentation Stable/Uncomplicated   Clinical Presentation Rationale Pt not currently having symptoms, has used sleeve in past. Complexity in that she has mets and may need treatment for this limiting her ability to participate in therapy on a regular basis   Clinical Decision Making (Complexity) Low complexity   Treatment frequency (see clinical impression)   Patient / family and/or staff in agreement with plan of care Yes   Risks and benefits of therapy have been explained Yes   Clinical impression comments Pt would benefit from intensive treatment of 3x/wk for 4 weeks and 1 month follow-up x2 to reduce swelling before garment fitting.  If she decides to forgo the intensive therapy due to other more pressing needs, pt would benefit from 4 visits over 3 months when is convenient to establish home program and ensure she gets a proper garment.    Education Assessment   Preferred learning style  Listening;Reading;Demonstration;Pictures / video   Barriers to learning Language;Emotional  ( but pt states that she understands without )   Goals   Edema Eval Goals 1;2   Goal 1   Goal identifier Compression   Goal description Pt will obtain properly fitting compression and demonstrate understand of scheduled use and care for garment to prevent worsening edema and decrease risk of infection.    Target date 11/25/17   Goal 2   Goal identifier Home Program   Goal description Pt will be independent in an edema home program including exercise and self massage to better manage chronic lymphedema.   Target date 11/25/17   Total Evaluation Time   Total evaluation time 30   Certification   Certification date from 08/28/17   Certification date to 11/25/17   Medical Diagnosis Lymphedema, Breast Cancer      Statement Selected

## 2018-10-01 ENCOUNTER — RADIANT APPOINTMENT (OUTPATIENT)
Dept: CT IMAGING | Facility: CLINIC | Age: 63
End: 2018-10-01
Attending: INTERNAL MEDICINE
Payer: COMMERCIAL

## 2018-10-01 DIAGNOSIS — C50.912 MALIGNANT NEOPLASM OF LEFT FEMALE BREAST, UNSPECIFIED ESTROGEN RECEPTOR STATUS, UNSPECIFIED SITE OF BREAST (H): ICD-10-CM

## 2018-10-01 RX ORDER — HEPARIN SODIUM (PORCINE) LOCK FLUSH IV SOLN 100 UNIT/ML 100 UNIT/ML
5 SOLUTION INTRAVENOUS ONCE
Status: COMPLETED | OUTPATIENT
Start: 2018-10-01 | End: 2018-10-01

## 2018-10-01 RX ORDER — IOPAMIDOL 755 MG/ML
112 INJECTION, SOLUTION INTRAVASCULAR ONCE
Status: COMPLETED | OUTPATIENT
Start: 2018-10-01 | End: 2018-10-01

## 2018-10-01 RX ADMIN — IOPAMIDOL 112 ML: 755 INJECTION, SOLUTION INTRAVASCULAR at 10:12

## 2018-10-01 RX ADMIN — HEPARIN SODIUM (PORCINE) LOCK FLUSH IV SOLN 100 UNIT/ML 5 ML: 100 SOLUTION at 10:17

## 2018-10-01 NOTE — DISCHARGE INSTRUCTIONS

## 2018-10-02 ENCOUNTER — ONCOLOGY VISIT (OUTPATIENT)
Dept: ONCOLOGY | Facility: CLINIC | Age: 63
End: 2018-10-02
Attending: INTERNAL MEDICINE
Payer: COMMERCIAL

## 2018-10-02 ENCOUNTER — INFUSION THERAPY VISIT (OUTPATIENT)
Dept: ONCOLOGY | Facility: CLINIC | Age: 63
End: 2018-10-02
Attending: PHYSICIAN ASSISTANT
Payer: COMMERCIAL

## 2018-10-02 ENCOUNTER — TELEPHONE (OUTPATIENT)
Dept: ONCOLOGY | Facility: CLINIC | Age: 63
End: 2018-10-02

## 2018-10-02 VITALS
RESPIRATION RATE: 16 BRPM | SYSTOLIC BLOOD PRESSURE: 122 MMHG | DIASTOLIC BLOOD PRESSURE: 80 MMHG | WEIGHT: 184.2 LBS | HEART RATE: 81 BPM | OXYGEN SATURATION: 98 % | TEMPERATURE: 98.1 F | HEIGHT: 62 IN | BODY MASS INDEX: 33.9 KG/M2

## 2018-10-02 DIAGNOSIS — C79.51 BONE METASTASIS: ICD-10-CM

## 2018-10-02 DIAGNOSIS — C50.912 MALIGNANT NEOPLASM OF LEFT FEMALE BREAST, UNSPECIFIED ESTROGEN RECEPTOR STATUS, UNSPECIFIED SITE OF BREAST (H): Primary | ICD-10-CM

## 2018-10-02 DIAGNOSIS — F51.01 PRIMARY INSOMNIA: Primary | ICD-10-CM

## 2018-10-02 DIAGNOSIS — C50.912 MALIGNANT NEOPLASM OF LEFT FEMALE BREAST, UNSPECIFIED ESTROGEN RECEPTOR STATUS, UNSPECIFIED SITE OF BREAST (H): ICD-10-CM

## 2018-10-02 LAB
ALBUMIN SERPL-MCNC: 3 G/DL (ref 3.4–5)
ALP SERPL-CCNC: 37 U/L (ref 40–150)
ALT SERPL W P-5'-P-CCNC: 33 U/L (ref 0–50)
ANION GAP SERPL CALCULATED.3IONS-SCNC: 5 MMOL/L (ref 3–14)
AST SERPL W P-5'-P-CCNC: 30 U/L (ref 0–45)
BASOPHILS # BLD AUTO: 0 10E9/L (ref 0–0.2)
BASOPHILS NFR BLD AUTO: 0.5 %
BILIRUB SERPL-MCNC: 0.2 MG/DL (ref 0.2–1.3)
BUN SERPL-MCNC: 16 MG/DL (ref 7–30)
CALCIUM SERPL-MCNC: 8.2 MG/DL (ref 8.5–10.1)
CANCER AG27-29 SERPL-ACNC: 10 U/ML (ref 0–39)
CEA SERPL-MCNC: <0.5 UG/L (ref 0–2.5)
CHLORIDE SERPL-SCNC: 107 MMOL/L (ref 94–109)
CO2 SERPL-SCNC: 28 MMOL/L (ref 20–32)
CREAT SERPL-MCNC: 0.73 MG/DL (ref 0.52–1.04)
DIFFERENTIAL METHOD BLD: ABNORMAL
EOSINOPHIL # BLD AUTO: 0.3 10E9/L (ref 0–0.7)
EOSINOPHIL NFR BLD AUTO: 4.4 %
ERYTHROCYTE [DISTWIDTH] IN BLOOD BY AUTOMATED COUNT: 13.4 % (ref 10–15)
GFR SERPL CREATININE-BSD FRML MDRD: 81 ML/MIN/1.7M2
GLUCOSE SERPL-MCNC: 92 MG/DL (ref 70–99)
HCT VFR BLD AUTO: 36.6 % (ref 35–47)
HGB BLD-MCNC: 11.3 G/DL (ref 11.7–15.7)
IMM GRANULOCYTES # BLD: 0 10E9/L (ref 0–0.4)
IMM GRANULOCYTES NFR BLD: 0.2 %
LYMPHOCYTES # BLD AUTO: 2.1 10E9/L (ref 0.8–5.3)
LYMPHOCYTES NFR BLD AUTO: 33.8 %
MCH RBC QN AUTO: 29.9 PG (ref 26.5–33)
MCHC RBC AUTO-ENTMCNC: 30.9 G/DL (ref 31.5–36.5)
MCV RBC AUTO: 97 FL (ref 78–100)
MONOCYTES # BLD AUTO: 0.5 10E9/L (ref 0–1.3)
MONOCYTES NFR BLD AUTO: 8.1 %
NEUTROPHILS # BLD AUTO: 3.3 10E9/L (ref 1.6–8.3)
NEUTROPHILS NFR BLD AUTO: 53 %
NRBC # BLD AUTO: 0 10*3/UL
NRBC BLD AUTO-RTO: 0 /100
PLATELET # BLD AUTO: 191 10E9/L (ref 150–450)
POTASSIUM SERPL-SCNC: 3.8 MMOL/L (ref 3.4–5.3)
PROT SERPL-MCNC: 7.2 G/DL (ref 6.8–8.8)
RBC # BLD AUTO: 3.78 10E12/L (ref 3.8–5.2)
SODIUM SERPL-SCNC: 141 MMOL/L (ref 133–144)
WBC # BLD AUTO: 6.2 10E9/L (ref 4–11)

## 2018-10-02 PROCEDURE — G0463 HOSPITAL OUTPT CLINIC VISIT: HCPCS | Mod: ZF

## 2018-10-02 PROCEDURE — 90686 IIV4 VACC NO PRSV 0.5 ML IM: CPT | Mod: ZF | Performed by: PHYSICIAN ASSISTANT

## 2018-10-02 PROCEDURE — 25000128 H RX IP 250 OP 636: Mod: ZF | Performed by: INTERNAL MEDICINE

## 2018-10-02 PROCEDURE — 96413 CHEMO IV INFUSION 1 HR: CPT

## 2018-10-02 PROCEDURE — 96372 THER/PROPH/DIAG INJ SC/IM: CPT | Mod: 59

## 2018-10-02 PROCEDURE — T1013 SIGN LANG/ORAL INTERPRETER: HCPCS | Mod: U3,ZF

## 2018-10-02 PROCEDURE — 99214 OFFICE O/P EST MOD 30 MIN: CPT | Mod: ZP | Performed by: PHYSICIAN ASSISTANT

## 2018-10-02 PROCEDURE — 85025 COMPLETE CBC W/AUTO DIFF WBC: CPT | Performed by: PHYSICIAN ASSISTANT

## 2018-10-02 PROCEDURE — 82378 CARCINOEMBRYONIC ANTIGEN: CPT | Performed by: PHYSICIAN ASSISTANT

## 2018-10-02 PROCEDURE — T1013 SIGN LANG/ORAL INTERPRETER: HCPCS | Mod: U3

## 2018-10-02 PROCEDURE — 25000128 H RX IP 250 OP 636: Mod: ZF | Performed by: PHYSICIAN ASSISTANT

## 2018-10-02 PROCEDURE — 80053 COMPREHEN METABOLIC PANEL: CPT | Performed by: PHYSICIAN ASSISTANT

## 2018-10-02 PROCEDURE — G0008 ADMIN INFLUENZA VIRUS VAC: HCPCS

## 2018-10-02 PROCEDURE — 86300 IMMUNOASSAY TUMOR CA 15-3: CPT | Performed by: PHYSICIAN ASSISTANT

## 2018-10-02 RX ORDER — ACETAMINOPHEN 325 MG/1
650 TABLET ORAL
Status: CANCELLED | OUTPATIENT
Start: 2018-10-02

## 2018-10-02 RX ORDER — LORAZEPAM 2 MG/ML
0.5 INJECTION INTRAMUSCULAR EVERY 4 HOURS PRN
Status: CANCELLED
Start: 2018-10-02

## 2018-10-02 RX ORDER — ALBUTEROL SULFATE 90 UG/1
1-2 AEROSOL, METERED RESPIRATORY (INHALATION)
Status: CANCELLED
Start: 2018-10-23

## 2018-10-02 RX ORDER — ALBUTEROL SULFATE 0.83 MG/ML
2.5 SOLUTION RESPIRATORY (INHALATION)
Status: CANCELLED | OUTPATIENT
Start: 2018-10-23

## 2018-10-02 RX ORDER — HEPARIN SODIUM (PORCINE) LOCK FLUSH IV SOLN 100 UNIT/ML 100 UNIT/ML
5 SOLUTION INTRAVENOUS EVERY 8 HOURS
Status: CANCELLED | OUTPATIENT
Start: 2018-10-23

## 2018-10-02 RX ORDER — DIPHENHYDRAMINE HCL 25 MG
50 CAPSULE ORAL ONCE
Status: CANCELLED
Start: 2018-10-23

## 2018-10-02 RX ORDER — DIPHENHYDRAMINE HCL 25 MG
50 CAPSULE ORAL ONCE
Status: CANCELLED
Start: 2018-10-02

## 2018-10-02 RX ORDER — HEPARIN SODIUM (PORCINE) LOCK FLUSH IV SOLN 100 UNIT/ML 100 UNIT/ML
5 SOLUTION INTRAVENOUS EVERY 8 HOURS
Status: DISCONTINUED | OUTPATIENT
Start: 2018-10-02 | End: 2018-10-02 | Stop reason: HOSPADM

## 2018-10-02 RX ORDER — METHYLPREDNISOLONE SODIUM SUCCINATE 125 MG/2ML
125 INJECTION, POWDER, LYOPHILIZED, FOR SOLUTION INTRAMUSCULAR; INTRAVENOUS
Status: CANCELLED
Start: 2018-10-23

## 2018-10-02 RX ORDER — MEPERIDINE HYDROCHLORIDE 25 MG/ML
25 INJECTION INTRAMUSCULAR; INTRAVENOUS; SUBCUTANEOUS EVERY 30 MIN PRN
Status: CANCELLED | OUTPATIENT
Start: 2018-10-23

## 2018-10-02 RX ORDER — HEPARIN SODIUM (PORCINE) LOCK FLUSH IV SOLN 100 UNIT/ML 100 UNIT/ML
5 SOLUTION INTRAVENOUS EVERY 8 HOURS PRN
Status: DISCONTINUED | OUTPATIENT
Start: 2018-10-02 | End: 2018-10-02 | Stop reason: HOSPADM

## 2018-10-02 RX ORDER — ACETAMINOPHEN 325 MG/1
650 TABLET ORAL
Status: CANCELLED | OUTPATIENT
Start: 2018-10-23

## 2018-10-02 RX ORDER — EPINEPHRINE 1 MG/ML
0.3 INJECTION, SOLUTION INTRAMUSCULAR; SUBCUTANEOUS EVERY 5 MIN PRN
Status: CANCELLED | OUTPATIENT
Start: 2018-10-02

## 2018-10-02 RX ORDER — SODIUM CHLORIDE 9 MG/ML
1000 INJECTION, SOLUTION INTRAVENOUS CONTINUOUS PRN
Status: CANCELLED
Start: 2018-10-02

## 2018-10-02 RX ORDER — LANOLIN ALCOHOL/MO/W.PET/CERES
3 CREAM (GRAM) TOPICAL
Qty: 30 TABLET | Refills: 0 | Status: SHIPPED | OUTPATIENT
Start: 2018-10-02 | End: 2018-12-05

## 2018-10-02 RX ORDER — EPINEPHRINE 0.3 MG/.3ML
0.3 INJECTION SUBCUTANEOUS EVERY 5 MIN PRN
Status: CANCELLED | OUTPATIENT
Start: 2018-10-02

## 2018-10-02 RX ORDER — EPINEPHRINE 0.3 MG/.3ML
0.3 INJECTION SUBCUTANEOUS EVERY 5 MIN PRN
Status: CANCELLED | OUTPATIENT
Start: 2018-10-23

## 2018-10-02 RX ORDER — DIPHENHYDRAMINE HYDROCHLORIDE 50 MG/ML
50 INJECTION INTRAMUSCULAR; INTRAVENOUS
Status: CANCELLED
Start: 2018-10-23

## 2018-10-02 RX ORDER — DIPHENHYDRAMINE HYDROCHLORIDE 50 MG/ML
50 INJECTION INTRAMUSCULAR; INTRAVENOUS
Status: CANCELLED
Start: 2018-10-02

## 2018-10-02 RX ORDER — SODIUM CHLORIDE 9 MG/ML
1000 INJECTION, SOLUTION INTRAVENOUS CONTINUOUS PRN
Status: CANCELLED
Start: 2018-10-23

## 2018-10-02 RX ORDER — LORAZEPAM 2 MG/ML
0.5 INJECTION INTRAMUSCULAR EVERY 4 HOURS PRN
Status: CANCELLED
Start: 2018-10-23

## 2018-10-02 RX ORDER — HEPARIN SODIUM (PORCINE) LOCK FLUSH IV SOLN 100 UNIT/ML 100 UNIT/ML
5 SOLUTION INTRAVENOUS EVERY 8 HOURS
Status: CANCELLED | OUTPATIENT
Start: 2018-10-02

## 2018-10-02 RX ORDER — EPINEPHRINE 1 MG/ML
0.3 INJECTION, SOLUTION INTRAMUSCULAR; SUBCUTANEOUS EVERY 5 MIN PRN
Status: CANCELLED | OUTPATIENT
Start: 2018-10-23

## 2018-10-02 RX ORDER — MEPERIDINE HYDROCHLORIDE 25 MG/ML
25 INJECTION INTRAMUSCULAR; INTRAVENOUS; SUBCUTANEOUS EVERY 30 MIN PRN
Status: CANCELLED | OUTPATIENT
Start: 2018-10-02

## 2018-10-02 RX ORDER — ALBUTEROL SULFATE 90 UG/1
1-2 AEROSOL, METERED RESPIRATORY (INHALATION)
Status: CANCELLED
Start: 2018-10-02

## 2018-10-02 RX ORDER — ALBUTEROL SULFATE 0.83 MG/ML
2.5 SOLUTION RESPIRATORY (INHALATION)
Status: CANCELLED | OUTPATIENT
Start: 2018-10-02

## 2018-10-02 RX ORDER — METHYLPREDNISOLONE SODIUM SUCCINATE 125 MG/2ML
125 INJECTION, POWDER, LYOPHILIZED, FOR SOLUTION INTRAMUSCULAR; INTRAVENOUS
Status: CANCELLED
Start: 2018-10-02

## 2018-10-02 RX ADMIN — DENOSUMAB 120 MG: 120 INJECTION SUBCUTANEOUS at 12:13

## 2018-10-02 RX ADMIN — Medication 5 ML: at 10:14

## 2018-10-02 RX ADMIN — TRASTUZUMAB 478 MG: 150 INJECTION, POWDER, LYOPHILIZED, FOR SOLUTION INTRAVENOUS at 12:09

## 2018-10-02 RX ADMIN — Medication 5 ML: at 12:43

## 2018-10-02 RX ADMIN — INFLUENZA A VIRUS A/MICHIGAN/45/2015 X-275 (H1N1) ANTIGEN (FORMALDEHYDE INACTIVATED), INFLUENZA A VIRUS A/SINGAPORE/INFIMH-16-0019/2016 IVR-186 (H3N2) ANTIGEN (FORMALDEHYDE INACTIVATED), INFLUENZA B VIRUS B/PHUKET/3073/2013 ANTIGEN (FORMALDEHYDE INACTIVATED), AND INFLUENZA B VIRUS B/MARYLAND/15/2016 BX-69A ANTIGEN (FORMALDEHYDE INACTIVATED) 0.5 ML: 15; 15; 15; 15 INJECTION, SUSPENSION INTRAMUSCULAR at 12:15

## 2018-10-02 ASSESSMENT — PAIN SCALES - GENERAL: PAINLEVEL: NO PAIN (0)

## 2018-10-02 NOTE — PATIENT INSTRUCTIONS
Contact Numbers    [facility] Main Line: [phone]  [facility] Triage and after hours / weekends / holidays:  [phone]      Please call the triage or after hours line if you experience a temperature greater than or equal to 100.5, shaking chills, have uncontrolled nausea, vomiting and/or diarrhea, dizziness, shortness of breath, chest pain, bleeding, unexplained bruising, or if you have any other new/concerning symptoms, questions or concerns.      If you are having any concerning symptoms or wish to speak to a provider before your next infusion visit, please call your care coordinator or triage to notify them so we can adequately serve you.     If you need a refill on a narcotic prescription or other medication, please call before your infusion appointment.             October 2018 Sunday Monday Tuesday Wednesday Thursday Friday Saturday        1     CT CHEST/ABDOMEN/PELVIS W    9:25 AM   (60 min.)   UCCT2   [facility] CT 2     [facility] DRAW    9:45 AM   (30 min.)    [facility]     [facility] RETURN   10:15 AM   (90 min.)   Jossie Sparrow PA   [facility]     [facility] ONC INFUSION 60   11:30 AM   (90 min.)    ONCOLOGY INFUSION   [facility] 3     4     5     6       7     8     9     10     11     12     13       14     15     16     17     18     19     20       21     22     23     [facility] DRAW    2:00 PM   (15 min.)    [facility] LAB DRAW   [facility] Lab Draw     [facility] ONC INFUSION 60    2:30 PM   (60 min.)    ONCOLOGY INFUSION   [facility] 24     25     26     27       28     29     30     31 November 2018 Sunday Monday Tuesday Wednesday Thursday Friday Saturday                       1     2     3       4     5     6     7     8     9     10       11     12     13     [facility] DRAW   12:30 PM   (15 min.)    [facility]RAW   [facility]      [facility] RETURN   12:45 PM   (30 min.)   Lisandro Aguiar MD   [facility]     [facility] ONC INFUSION 60    2:00 PM   (60 min.)    ONCOLOGY INFUSION   [facility] 14     15     16     17       18     19     20     21     22     23     24       25     26     27     28     29     30                      Recent Results (from the past 24 hour(s))   CBC with platelets differential    Collection Time: 10/02/18 10:18 AM   Result Value Ref Range    WBC 6.2 4.0 - 11.0 10e9/L    RBC Count 3.78 (L) 3.8 - 5.2 10e12/L    Hemoglobin 11.3 (L) 11.7 - 15.7 g/dL    Hematocrit 36.6 35.0 - 47.0 %    MCV 97 78 - 100 fl    MCH 29.9 26.5 - 33.0 pg    MCHC 30.9 (L) 31.5 - 36.5 g/dL    RDW 13.4 10.0 - 15.0 %    Platelet Count 191 150 - 450 10e9/L    Diff Method Automated Method     % Neutrophils 53.0 %    % Lymphocytes 33.8 %    % Monocytes 8.1 %    % Eosinophils 4.4 %    % Basophils 0.5 %    % Immature Granulocytes 0.2 %    Nucleated RBCs 0 0 /100    Absolute Neutrophil 3.3 1.6 - 8.3 10e9/L    Absolute Lymphocytes 2.1 0.8 - 5.3 10e9/L    Absolute Monocytes 0.5 0.0 - 1.3 10e9/L    Absolute Eosinophils 0.3 0.0 - 0.7 10e9/L    Absolute Basophils 0.0 0.0 - 0.2 10e9/L    Abs Immature Granulocytes 0.0 0 - 0.4 10e9/L    Absolute Nucleated RBC 0.0    Comprehensive metabolic panel    Collection Time: 10/02/18 10:18 AM   Result Value Ref Range    Sodium 141 133 - 144 mmol/L    Potassium 3.8 3.4 - 5.3 mmol/L    Chloride 107 94 - 109 mmol/L    Carbon Dioxide 28 20 - 32 mmol/L    Anion Gap 5 3 - 14 mmol/L    Glucose 92 70 - 99 mg/dL    Urea Nitrogen 16 7 - 30 mg/dL    Creatinine 0.73 0.52 - 1.04 mg/dL    GFR Estimate 81 >60 mL/min/1.7m2    GFR Estimate If Black >90 >60 mL/min/1.7m2    Calcium 8.2 (L) 8.5 - 10.1 mg/dL    Bilirubin Total 0.2 0.2 - 1.3 mg/dL    Albumin 3.0 (L) 3.4 - 5.0 g/dL    Protein Total 7.2 6.8 - 8.8 g/dL    Alkaline Phosphatase 37 (L) 40 - 150 U/L    ALT 33 0 - 50 U/L    AST 30 0 - 45 U/L

## 2018-10-02 NOTE — MR AVS SNAPSHOT
After Visit Summary   10/2/2018    Hoda Brush    MRN: [id number]           Patient Information     Date Of Birth          1955        Visit Information        Provider Department      10/2/2018 10:15 AM Wesley Iraheta; Jossie Sparrow PA [facility]        Today's Diagnoses     Primary insomnia    -  1    Malignant neoplasm of left female breast, unspecified estrogen receptor status, unspecified site of breast (H)           Follow-ups after your visit        Your next 10 appointments already scheduled     Oct 02, 2018 11:30 AM CDT   Infusion 60 with UC ONCOLOGY INFUSION, UC 28 ATC   [facility] ([facility])    [address] [place] [address]  [address]  [facility] [address]   [phone]            Oct 23, 2018  2:00 PM CDT   Masonic Lab Draw with UC MASONIC LAB DRAW   [facility] ([facility])    [address] [place] [address]  [address]  [facility] [address]   [phone]            Oct 23, 2018  2:30 PM CDT   Infusion 60 with UC ONCOLOGY INFUSION, UC 29 ATC   [facility] ([facility])    [address] [place] [address]  [address]  [facility] [address]   [phone]            Nov 13, 2018 12:30 PM CST   Masonic Lab Draw with UC MASONIC LAB DRAW   [facility] ([facility])    [address] [place] [address]  [address]  [facility] [address]   [phone]            Nov 13, 2018  1:00 PM CST   (Arrive by 12:45 PM)   Return Visit with Lisandro Aguiar MD   [facility] ([facility])    [address] [place] [address]  [address]  [facility] [address]   [phone]            Nov 13, 2018  2:00 PM CST   Infusion 60 with UC ONCOLOGY INFUSION, UC 15 ATC   [facility] ([facility])    [address] [place] [address]  [address]  [facility] [address]  "  [phone]              Who to contact     If you have questions or need follow up information about today's clinic visit or your schedule please contact [facility] directly at [phone].  Normal or non-critical lab and imaging results will be communicated to you by MyChart, letter or phone within 4 business days after the clinic has received the results. If you do not hear from us within 7 days, please contact the clinic through [employer] or phone. If you have a critical or abnormal lab result, we will notify you by phone as soon as possible.  Submit refill requests through [facility] or call your pharmacy and they will forward the refill request to us. Please allow 3 business days for your refill to be completed.          Additional Information About Your Visit        [employer] Information     [facility] gives you secure access to your electronic health record. If you see a primary care provider, you can also send messages to your care team and make appointments. If you have questions, please call your primary care clinic.  If you do not have a primary care provider, please call [phone] and they will assist you.        Care EveryWhere ID     This is your Care EveryWhere ID. This could be used by other organizations to access your [place] medical records  [id number]        Your Vitals Were     Pulse Temperature Respirations Height Pulse Oximetry BMI (Body Mass Index)    81 98.1  F (36.7  C) (Oral) 16 1.575 m (5' 2.01\") 98% 33.68 kg/m2       Blood Pressure from Last 3 Encounters:   10/02/18 122/80   09/13/18 108/68   08/22/18 120/63    Weight from Last 3 Encounters:   10/02/18 83.6 kg (184 lb 3.2 oz)   09/13/18 82.7 kg (182 lb 6.4 oz)   08/22/18 82.8 kg (182 lb 9.6 oz)              Today, you had the following     No orders found for display         Today's Medication Changes          These changes are accurate as of 10/2/18 11:10 AM.  If you have any questions, ask your nurse or doctor.    "            Start taking these medicines.        Dose/Directions    melatonin 3 MG tablet   Used for:  Primary insomnia   Started by:  Jossie Sparrow PA        Dose:  3 mg   Take 1 tablet (3 mg) by mouth nightly as needed for sleep   Quantity:  30 tablet   Refills:  0       order for DME   Used for:  Malignant neoplasm of left female breast, unspecified estrogen receptor status, unspecified site of breast (H)   Started by:  Jossie Sparrow PA        Equipment being ordered: 2 Mastectomy bras and 1 prosthetheses.   Quantity:  2 Piece   Refills:  0         Stop taking these medicines if you haven't already. Please contact your care team if you have questions.     mirtazapine 15 MG tablet   Commonly known as:  REMERON   Stopped by:  Jossie Sparrow PA                Where to get your medicines      These medications were sent to [facility] [address] [place] [address], [place] - [address] [facility]  [facility] [facility] [address]     Phone:  [phone]     melatonin 3 MG tablet         Some of these will need a paper prescription and others can be bought over the counter.  Ask your nurse if you have questions.     Bring a paper prescription for each of these medications     order for DME                Primary Care Provider Fax #    Physician No Ref-Primary [phone]       No address on file        Equal Access to Services     NOE VAZQUEZ : Luz Marina nayak Sosusanna, waaxda luqadaha, qaybta kaalmada adeegyada, alexa beltran. So [facility] [phone].    ATENCIÓN: Si habla español, tiene a lagunas disposición servicios gratuitos de asistencia lingüística. Llrashi al [phone].    We comply with applicable federal civil rights laws and [place] laws. We do not discriminate on the basis of race, color, national origin, age, disability, sex, sexual orientation, or gender identity.            Thank you!     Thank you for choosing [facility]  CLINIC  for your care. Our goal is always to provide you with excellent care. Hearing back from our patients is one way we can continue to improve our services. Please take a few minutes to complete the written survey that you may receive in the mail after your visit with us. Thank you!             Your Updated Medication List - Protect others around you: Learn how to safely use, store and throw away your medicines at www.disposemymeds.org.          This list is accurate as of 10/2/18 11:10 AM.  Always use your most recent med list.                   Brand Name Dispense Instructions for use Diagnosis    melatonin 3 MG tablet     30 tablet    Take 1 tablet (3 mg) by mouth nightly as needed for sleep    Primary insomnia       order for DME     1 each    L UE class 2 compression sleeve and gauntlet Night garment Bandaging supplies    Lymphedema of left upper extremity       order for DME     2 Piece    Equipment being ordered: 2 Mastectomy bras and 1 prosthetheses.    Malignant neoplasm of left female breast, unspecified estrogen receptor status, unspecified site of breast (H)       tamoxifen 20 MG tablet    NOLVADEX    90 tablet    Take 1 tablet (20 mg) by mouth daily    Cancer of central portion of left breast (H)       VITAMIN D (CHOLECALCIFEROL) PO      Take 1,000 Units by mouth daily

## 2018-10-02 NOTE — MR AVS SNAPSHOT
After Visit Summary   10/2/2018    Hoda Brush    MRN: [id number]           Patient Information     Date Of Birth          1955        Visit Information        Provider Department      10/2/2018 11:30 AM Sheeba Cifuentes;  28 ATC;  ONCOLOGY INFUSION  Services Department        Today's Diagnoses     Malignant neoplasm of left female breast, unspecified estrogen receptor status, unspecified site of breast (H)    -  1    Bone metastasis (H)          Care Instructions    Contact Numbers    [facility] Main Line: [phone]  [facility] Triage and after hours / weekends / holidays:  [phone]      Please call the triage or after hours line if you experience a temperature greater than or equal to 100.5, shaking chills, have uncontrolled nausea, vomiting and/or diarrhea, dizziness, shortness of breath, chest pain, bleeding, unexplained bruising, or if you have any other new/concerning symptoms, questions or concerns.      If you are having any concerning symptoms or wish to speak to a provider before your next infusion visit, please call your care coordinator or triage to notify them so we can adequately serve you.     If you need a refill on a narcotic prescription or other medication, please call before your infusion appointment.             October 2018 Sunday Monday Tuesday Wednesday Thursday Friday Saturday        1     CT CHEST/ABDOMEN/PELVIS W    9:25 AM   (60 min.)   UCCT2   [facility] CT 2     [facility] MASONIC LAB DRAW    9:45 AM   (30 min.)   [facility]   [facility] Lab Draw     [facility] RETURN   10:15 AM   (90 min.)   Jossie Sparrow, PA   [facility] ED ONC INFUSION 60   11:30 AM   (90 min.)    ONCOLOGY INFUSION   [facility] 3     4     5     6       7     8     9     10     11     12     13       14     15     16     17     18     19     20       21     22     23     [facility] MASONIC LAB DRAW    2:00 PM   (15  min.)    MASONIC LAB DRAW   [facility] Lab Draw     P ONC INFUSION 60    2:30 PM   (60 min.)   UC ONCOLOGY INFUSION   [facility] 24     25     26     27       28     29     30     31 November 2018 Sunday Monday Tuesday Wednesday Thursday Friday Saturday                       1     2     3       4     5     6     7     8     9     10       11     12     13     [facility] MASONIC LAB DRAW   12:30 PM   (15 min.)    MASONIC LAB DRAW   [facility] Lab Draw     [facility] RETURN   12:45 PM   (30 min.)   Lisandro Aguiar MD   [facility]     [facility] ONC INFUSION 60    2:00 PM   (60 min.)    ONCOLOGY INFUSION   [facility] 14     15     16     17       18     19     20     21     22     23     24       25     26     27     28     29     30                      Recent Results (from the past 24 hour(s))   CBC with platelets differential    Collection Time: 10/02/18 10:18 AM   Result Value Ref Range    WBC 6.2 4.0 - 11.0 10e9/L    RBC Count 3.78 (L) 3.8 - 5.2 10e12/L    Hemoglobin 11.3 (L) 11.7 - 15.7 g/dL    Hematocrit 36.6 35.0 - 47.0 %    MCV 97 78 - 100 fl    MCH 29.9 26.5 - 33.0 pg    MCHC 30.9 (L) 31.5 - 36.5 g/dL    RDW 13.4 10.0 - 15.0 %    Platelet Count 191 150 - 450 10e9/L    Diff Method Automated Method     % Neutrophils 53.0 %    % Lymphocytes 33.8 %    % Monocytes 8.1 %    % Eosinophils 4.4 %    % Basophils 0.5 %    % Immature Granulocytes 0.2 %    Nucleated RBCs 0 0 /100    Absolute Neutrophil 3.3 1.6 - 8.3 10e9/L    Absolute Lymphocytes 2.1 0.8 - 5.3 10e9/L    Absolute Monocytes 0.5 0.0 - 1.3 10e9/L    Absolute Eosinophils 0.3 0.0 - 0.7 10e9/L    Absolute Basophils 0.0 0.0 - 0.2 10e9/L    Abs Immature Granulocytes 0.0 0 - 0.4 10e9/L    Absolute Nucleated RBC 0.0    Comprehensive metabolic panel    Collection Time: 10/02/18 10:18 AM   Result Value Ref Range    Sodium 141 133 - 144 mmol/L    Potassium 3.8 3.4 - 5.3  mmol/L    Chloride 107 94 - 109 mmol/L    Carbon Dioxide 28 20 - 32 mmol/L    Anion Gap 5 3 - 14 mmol/L    Glucose 92 70 - 99 mg/dL    Urea Nitrogen 16 7 - 30 mg/dL    Creatinine 0.73 0.52 - 1.04 mg/dL    GFR Estimate 81 >60 mL/min/1.7m2    GFR Estimate If Black >90 >60 mL/min/1.7m2    Calcium 8.2 (L) 8.5 - 10.1 mg/dL    Bilirubin Total 0.2 0.2 - 1.3 mg/dL    Albumin 3.0 (L) 3.4 - 5.0 g/dL    Protein Total 7.2 6.8 - 8.8 g/dL    Alkaline Phosphatase 37 (L) 40 - 150 U/L    ALT 33 0 - 50 U/L    AST 30 0 - 45 U/L                 Follow-ups after your visit        Your next 10 appointments already scheduled     Oct 23, 2018  2:00 PM CDT   Masonic Lab Draw with UC MASONIC LAB DRAW   [facility]    [address] [place] [address]  [address]  [facility] [id number] [phone]            Oct 23, 2018  2:30 PM CDT   Infusion 60 with UC ONCOLOGY INFUSION, UC 29 ATC   [facility] ([facility])    [address] [place] [address]  [address]  [facility] [address] [phone]            Nov 13, 2018 12:30 PM CST   Masonic Lab Draw with  MASONIC LAB DRAW   [facility] Masonic Lab Draw ([facility])    [address] [place] [address]  [address]  [facility] [id number] [phone]            Nov 13, 2018  1:00 PM CST   (Arrive by 12:45 PM)   Return Visit with Lisandro Aguiar MD   [facility] ([facility])    [address] [place] [address]  [address]  [facility] [address] [phone]            Nov 13, 2018  2:00 PM CST   Infusion 60 with UC ONCOLOGY INFUSION, UC 15 ATC   [facility] ([facility])    [address] [place] [address]  [address]  [facility] [id number] [phone]              Who to contact     If you have questions or need follow up information about today's clinic visit or your schedule please contact [facility] directly at [phone].  Normal or non-critical lab and imaging results will be communicated to you by [employer], letter or phone within 4 business days after the clinic has received the results. If you do not hear from us within 7 days, please contact the clinic through [employer] or phone. If you have a critical or abnormal lab result, we will notify you by phone as soon as possible.  Submit refill requests through [employer] or call your pharmacy and they will forward the refill request to us. Please allow 3 business days for your refill to be completed.          Additional Information About Your Visit        [employer] Information     [employer] gives you secure access to your electronic health record. If you see a primary care provider, you can also send messages to your care team and make appointments. If you have questions, please call your primary care clinic.  If you do not have a primary care provider, please call [phone] and they will assist you.        Care EveryWhere ID     This is your Care EveryWhere ID. This could be used by other organizations to access your [place] medical records  [id number]         Blood Pressure from Last 3 Encounters:   10/02/18 122/80   09/13/18 108/68   08/22/18 120/63    Weight from Last 3 Encounters:   10/02/18 83.6 kg (184 lb 3.2 oz)   09/13/18 82.7 kg (182 lb 6.4 oz)   08/22/18 82.8 kg (182 lb 9.6 oz)              We Performed the Following     CA 27.29 Breast tumor marker     CBC with platelets differential     CEA     Comprehensive metabolic panel          Today's Medication Changes          These changes are accurate as of 10/2/18 12:32 PM.  If you have any questions, ask your nurse or doctor.               Start taking these medicines.        Dose/Directions    melatonin 3 MG tablet   Used for:  Primary insomnia   Started by:  Jossie Sparrow PA        Dose:  3 mg   Take 1 tablet (3 mg) by mouth nightly as needed for sleep   Quantity:  30 tablet   Refills:  0        order for DME   Used for:  Malignant neoplasm of left female breast, unspecified estrogen receptor status, unspecified site of breast (H)   Started by:  Jossie Sparrow PA        Equipment being ordered: 2 Mastectomy bras and 1 prosthetheses.   Quantity:  2 Piece   Refills:  0         Stop taking these medicines if you haven't already. Please contact your care team if you have questions.     mirtazapine 15 MG tablet   Commonly known as:  REMERON   Stopped by:  Jossie Sparrow PA                Where to get your medicines      These medications were sent to [facility] [address] [place] [address], [place] - [address] [facility] [address]  [address] [place] [address]     Phone:  [phone]     melatonin 3 MG tablet         Some of these will need a paper prescription and others can be bought over the counter.  Ask your nurse if you have questions.     Bring a paper prescription for each of these medications     order for DME                Primary Care Provider Fax #    Physician No Ref-Primary [phone]       No address on file        Equal Access to Services     TONEY VAZQUEZ : Hadarturo nayak Sosusanna, waaxda luqadaha, qaybta kaalmateo adepretty, alexa angulo . So [facility] [phone].    ATENCIÓN: Si habla español, tiene a lagunas disposición servicios gratuitos de asistencia lingüística. Llame al [phone].    We comply with applicable federal civil rights laws and [place] laws. We do not discriminate on the basis of race, color, national origin, age, disability, sex, sexual orientation, or gender identity.            Thank you!     Thank you for choosing [facility]  for your care. Our goal is always to provide you with excellent care. Hearing back from our patients is one way we can continue to improve our services. Please take a few minutes to complete the written survey that you may receive in the mail after your visit with us.  Thank you!             Your Updated Medication List - Protect others around you: Learn how to safely use, store and throw away your medicines at www.disposemymeds.org.          This list is accurate as of 10/2/18 12:32 PM.  Always use your most recent med list.                   Brand Name Dispense Instructions for use Diagnosis    melatonin 3 MG tablet     30 tablet    Take 1 tablet (3 mg) by mouth nightly as needed for sleep    Primary insomnia       order for DME     1 each    L UE class 2 compression sleeve and gauntlet Night garment Bandaging supplies    Lymphedema of left upper extremity       order for DME     2 Piece    Equipment being ordered: 2 Mastectomy bras and 1 prosthetheses.    Malignant neoplasm of left female breast, unspecified estrogen receptor status, unspecified site of breast (H)       tamoxifen 20 MG tablet    NOLVADEX    90 tablet    Take 1 tablet (20 mg) by mouth daily    Cancer of central portion of left breast (H)       VITAMIN D (CHOLECALCIFEROL) PO      Take 1,000 Units by mouth daily

## 2018-10-02 NOTE — PROGRESS NOTES
Oncology/Hematology Visit Note  Oct 2, 2018    Reason for Visit: follow up of ER positive, HER2 positive left metastatic breast cancer    History of Present Illness: Hoda Brush is a 63 year old female with metastatic ER positive, HER 2 positive left breast cancer with bone mets.     TREATMENT HISTORY:  A. Initial diagnosis with metastatic breast cancer in [facility].  Neoadjuvant CAF x 6.   B. Left mastectomy. Left axillary node dissection.  C.  Radiation in 1 dose to R iliac region. g Gy.  C. Herceptin for 2 years taxane for a prescribed course then stopped, monthly zoledronic acid.  She had 2 years of Herceptin with tamoxifen added after chemotherapy.  D.  Tamoxifen alone and zoledronic acid every 3 months.  E.  [place]  We restarted Herceptin every 3 weeks and continued tamoxifen. Bone targeted agent changed to denosumab every 6 weeks.     She was admitted 9/20-9/21 with LUE cellulitis with leukocytosis. Given a dose of IV Ancef and discharged on keflex. 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    She presents prior to her next dose of Herceptin.      Interval History:  Hoda is seen with  present. Reviewed her CT CAP from 10/1 that shows no evidence of recurrent or metastatic disease in the chest, abdomen or pelvis. She is pleased to hear this. She does have uterine fibroids of which she is aware. She denies any vaginal bleeding. She had a pelvic exam during annual physical in August 2017 by her PCP, Dr. Webb. Encouraged her to schedule another annual physical this year. She continues on Tamoxifen. Sleep is sometimes better, sometimes  worse. She reduced remeron to 1/4 tablet because she had difficulties waking up. She does not take it every night. Otherwise feeling well.    She walks with her  but has not been walking as often due to weather. She is considering joining a gym and trying swimming for exercise. Appetite good. Denies any pain, depression, anxiety.    She is taking calcium and vitamin D. Denies chest pain, dyspnea, dizziness, headaches, edema in legs.  Her 10-point review of systems is otherwise negative.     Current Outpatient Prescriptions   Medication Sig Dispense Refill     mirtazapine (REMERON) 15 MG tablet Take 0.5 tablets (7.5 mg) by mouth At Bedtime 90 tablet 3     order for [employer] L UE class 2 compression sleeve and gauntlet  Night garment  Bandaging supplies 1 each 1     tamoxifen (NOLVADEX) 20 MG tablet Take 1 tablet (20 mg) by mouth daily 90 tablet 3     VITAMIN D, CHOLECALCIFEROL, PO Take 1,000 Units by mouth daily         Physical Examination:  General: The patient is a pleasant female in no acute distress.  There were no vitals taken for this visit.  Wt Readings from Last 10 Encounters:   09/13/18 82.7 kg (182 lb 6.4 oz)   08/22/18 82.8 kg (182 lb 9.6 oz)   08/01/18 82.3 kg (181 lb 8 oz)   07/10/18 81.2 kg (179 lb)   06/19/18 80.9 kg (178 lb 6.4 oz)   05/29/18 79.8 kg (176 lb)   05/08/18 79.7 kg (175 lb 12.8 oz)   04/17/18 79.1 kg (174 lb 6.4 oz)   03/27/18 78.9 kg (173 lb 14.4 oz)   03/06/18 78 kg (172 lb)     HEENT: EOMI, PERRL. Sclerae are anicteric. Oral mucosa is pink and moist with no lesions or thrush.   Lymph: No lymphadenopathy in the cervical, supraclavicular or axillary areas.  Breast: deferred today  Heart: Regular rate and rhythm.   Lungs: Clear to auscultation bilaterally.   Abdomen: Bowel sounds present, soft, nontender, nondistended  Extremities: No lower extremity edema noted bilaterally.   Neuro: Cranial nerves II through XII are grossly intact.  Skin: No rashes, petechiae, or bruising noted on  exposed skin.    Laboratory Data:  Results for NATASHA LOUIS (MRN [id number]) as of 10/2/2018 11:03   10/2/2018 10:18   Sodium 141   Potassium 3.8   Chloride 107   Carbon Dioxide 28   Urea Nitrogen 16   Creatinine 0.73   GFR Estimate 81   GFR Estimate If Black >90   Calcium 8.2 (L)   Anion Gap 5   Albumin 3.0 (L)   Protein Total 7.2   Bilirubin Total 0.2   Alkaline Phosphatase 37 (L)   ALT 33   AST 30   Glucose 92   WBC 6.2   Hemoglobin 11.3 (L)   Hematocrit 36.6   Platelet Count 191   RBC Count 3.78 (L)   MCV 97   MCH 29.9   MCHC 30.9 (L)   RDW 13.4   Diff Method Automated Method   % Neutrophils 53.0   % Lymphocytes 33.8   % Monocytes 8.1   % Eosinophils 4.4   % Basophils 0.5   % Immature Granulocytes 0.2   Nucleated RBCs 0   Absolute Neutrophil 3.3   Absolute Lymphocytes 2.1   Absolute Monocytes 0.5   Absolute Eosinophils 0.3   Absolute Basophils 0.0   Abs Immature Granulocytes 0.0   Absolute Nucleated RBC 0.0       Ca 27-29 and CEA pending    Imaging:  EXAMINATION: CT CHEST/ABDOMEN/PELVIS W CONTRAST, 10/1/2018 10:25 AM     TECHNIQUE:  Helical CT images from the thoracic inlet through the  symphysis pubis were obtained  with contrast. Contrast dose: Isovue  370 112mls     COMPARISON: CT cap 7/5/2018 and prior other multiple CT and PET/CT  exams.     HISTORY: ; Malignant neoplasm of left female breast, unspecified  estrogen receptor status, unspecified site of breast (H)     FINDINGS:  Chest: The heart size is within normal limits. No pericardial  effusion. Right IJ Port-A-Cath with the tip in the cavoatrial  junction. No enlarged thoracic lymph nodes. No suspicious thyroid  nodule. Postoperative changes of left breast mastectomy.     Central airways are patent. No pleural effusion or pneumothorax. No  evidence of infection in the chest.     4 mm right middle lobe nodule on image 98 series 8, and 5 mm left  lower lobe nodule on image 97 series 8 are stable. No new lung nodule.     Abdomen and pelvis: The  liver, spleen, adrenal glands, and pancreas  are normal. The gallbladder is decompressed with the patient's small  calcified gallstones. No hydronephrosis, renal mass, or  nephrolithiasis. No hydroureter. Urinary bladder is decompressed.     Unchanged myomatous uterus. No enlarged inguinal, pelvic, or  intra-abdominal lymph nodes by CT short axis size criteria.     No intraperitoneal free air or free fluid.     Bones and soft tissues: Numerous osseous sclerotic lesions, for  example T7, T8, T9, T11, L1, L3, L5 which are not significantly  changed from the prior CT. Sclerotic expansile lesion in the right  fifth rib. Sclerotic and lucent lesion in the sternum and proximal  right femur. No newly identified osseous sclerotic lesion. Grossly  unchanged sclerotic and expansile iliac bones.         IMPRESSION:   In this patient with history of cancer, no evidence of recurrent or  new metastatic disease:  1a. Unchanged sclerotic and lytic/sclerotic lesions with no  demonstration FDG uptake on 2017 exam, compatible with treated osseous  metastatic disease.  1b. Stable sub-6 mm pulmonary nodules.  2. Multiple uterine fibroids.  3. 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     LONG SUTTON MD      Assessment and Plan:     1. Metastatic breast cancer, ER, [place], HER2+ positive: Was last treated in [facility] with Herceptin. We have continued Herceptin every 3 weeks as well as daily tamoxifen. She tolerates treatment extremely well without overt symptoms aside from some fatigue for 2 days after Herceptin. CT CAP on 10/118 with stable disease. Echocardiogram 9/13/2018 with EF 55-60% and normal LV function.  Continue Herceptin every 3 weeks. Continue Tamoxifen.   --Scheduled for next cycle on 10/23. Follow up with Dr. Aguiar on 11/13 for Herceptin and Xgeva  --Encouraged her to continue annual gynecologic exam with PCP, Dr. Webb. She will call to schedule an appointment as last exam  was August 2017    2. Bone metastasis: Continue xgeva every 6 weeks. On calcium + vitamin D. Xgeva due today. Calcium corrects to WNL. Will give today     3. LUE lymphedema: She completed lymphedema treatment and has a sleeve to use now.     4. Insomnia: She reduced mirtazipine to 1/4 tablet at bedtime due to excessive somnolence. She is not taking every night. Discussed trial of melatonin at bedtime to see if this improves somnolence next morning. She is willing to try. Rx for melatonin 3mg at bedtime prn sent.    5. Health maintenance: Flu shot will be given today    Addendum:   Results for NATASHA LOUIS (MRN [id number]) as of 10/2/2018 14:57   Ref. Range 8/22/2018 16:09 9/13/2018 12:01 9/13/2018 12:39 10/1/2018 10:25 10/2/2018 10:18   CA 27-29 Latest Ref Range: 0 - 39 U/mL 11  12  10     CEA Latest Ref Range: 0 - 2.5 ug/L 0.5  0.6  <0.5     CEA and CA 27-29 stable    Jossie Sparrow PA-C  [facility]  [address] [place] [address] [phone]

## 2018-10-02 NOTE — LETTER
10/2/2018      RE: Hoda rBush  [address] [address]       Oncology/Hematology Visit Note  Oct 2, 2018    Reason for Visit: follow up of ER positive, HER2 positive left metastatic breast cancer    History of Present Illness: Hoda Brush is a 63 year old female with metastatic ER positive, HER 2 positive left breast cancer with bone mets.     TREATMENT HISTORY:  A. Initial diagnosis with metastatic breast cancer in [facility].  Neoadjuvant CAF x 6.   B. Left mastectomy. Left axillary node dissection.  C.  Radiation in 1 dose to R iliac region. g Gy.  C. Herceptin for 2 years taxane for a prescribed course then stopped, monthly zoledronic acid.  She had 2 years of Herceptin with tamoxifen added after chemotherapy.  D.  Tamoxifen alone and zoledronic acid every 3 months.  E[place]  [place]  We restarted Herceptin every 3 weeks and continued tamoxifen. Bone targeted agent changed to denosumab every 6 weeks.     She was admitted 9/20-9/21 with LUE cellulitis with leukocytosis. Given a dose of IV Ancef and discharged on keflex. 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    She presents prior to her next dose of Herceptin.      Interval History:  Hoda is seen with  present. Reviewed her CT CAP from 10/1 that shows no evidence of recurrent or metastatic disease in the chest, abdomen or pelvis. She is pleased to hear this. She does have uterine fibroids of which she is aware. She denies any vaginal bleeding. She had a pelvic exam during annual physical in August 2017 by her PCP, Dr. Webb. Encouraged her to schedule another annual  physical this year. She continues on Tamoxifen. Sleep is sometimes better, sometimes worse. She reduced remeron to 1/4 tablet because she had difficulties waking up. She does not take it every night. Otherwise feeling well.    She walks with her  but has not been walking as often due to weather. She is considering joining a gym and trying swimming for exercise. Appetite good. Denies any pain, depression, anxiety.    She is taking calcium and vitamin D. Denies chest pain, dyspnea, dizziness, headaches, edema in legs.  Her 10-point review of systems is otherwise negative.     Current Outpatient Prescriptions   Medication Sig Dispense Refill     mirtazapine (REMERON) 15 MG tablet Take 0.5 tablets (7.5 mg) by mouth At Bedtime 90 tablet 3     order for [facility] L UE class 2 compression sleeve and gauntlet  Night garment  Bandaging supplies 1 each 1     tamoxifen (NOLVADEX) 20 MG tablet Take 1 tablet (20 mg) by mouth daily 90 tablet 3     VITAMIN D, CHOLECALCIFEROL, PO Take 1,000 Units by mouth daily         Physical Examination:  General: The patient is a pleasant female in no acute distress.  There were no vitals taken for this visit.  Wt Readings from Last 10 Encounters:   09/13/18 82.7 kg (182 lb 6.4 oz)   08/22/18 82.8 kg (182 lb 9.6 oz)   08/01/18 82.3 kg (181 lb 8 oz)   07/10/18 81.2 kg (179 lb)   06/19/18 80.9 kg (178 lb 6.4 oz)   05/29/18 79.8 kg (176 lb)   05/08/18 79.7 kg (175 lb 12.8 oz)   04/17/18 79.1 kg (174 lb 6.4 oz)   03/27/18 78.9 kg (173 lb 14.4 oz)   03/06/18 78 kg (172 lb)     HEENT: EOMI, PERRL. Sclerae are anicteric. Oral mucosa is pink and moist with no lesions or thrush.   Lymph: No lymphadenopathy in the cervical, supraclavicular or axillary areas.  Breast: deferred today  Heart: Regular rate and rhythm.   Lungs: Clear to auscultation bilaterally.   Abdomen: Bowel sounds present, soft, nontender, nondistended  Extremities: No lower extremity edema noted bilaterally.   Neuro: Cranial nerves II  through XII are grossly intact.  Skin: No rashes, petechiae, or bruising noted on exposed skin.    Laboratory Data:  Results for NATASHA LOUIS (MRN [id number]) as of 10/2/2018 11:03   10/2/2018 10:18   Sodium 141   Potassium 3.8   Chloride 107   Carbon Dioxide 28   Urea Nitrogen 16   Creatinine 0.73   GFR Estimate 81   GFR Estimate If Black >90   Calcium 8.2 (L)   Anion Gap 5   Albumin 3.0 (L)   Protein Total 7.2   Bilirubin Total 0.2   Alkaline Phosphatase 37 (L)   ALT 33   AST 30   Glucose 92   WBC 6.2   Hemoglobin 11.3 (L)   Hematocrit 36.6   Platelet Count 191   RBC Count 3.78 (L)   MCV 97   MCH 29.9   MCHC 30.9 (L)   RDW 13.4   Diff Method Automated Method   % Neutrophils 53.0   % Lymphocytes 33.8   % Monocytes 8.1   % Eosinophils 4.4   % Basophils 0.5   % Immature Granulocytes 0.2   Nucleated RBCs 0   Absolute Neutrophil 3.3   Absolute Lymphocytes 2.1   Absolute Monocytes 0.5   Absolute Eosinophils 0.3   Absolute Basophils 0.0   Abs Immature Granulocytes 0.0   Absolute Nucleated RBC 0.0       Ca 27-29 and CEA pending    Imaging:  EXAMINATION: CT CHEST/ABDOMEN/PELVIS W CONTRAST, 10/1/2018 10:25 AM     TECHNIQUE:  Helical CT images from the thoracic inlet through the  symphysis pubis were obtained  with contrast. Contrast dose: Isovue  370 112mls     COMPARISON: CT cap 7/5/2018 and prior other multiple CT and PET/CT  exams.     HISTORY: ; Malignant neoplasm of left female breast, unspecified  estrogen receptor status, unspecified site of breast (H)     FINDINGS:  Chest: The heart size is within normal limits. No pericardial  effusion. Right IJ Port-A-Cath with the tip in the cavoatrial  junction. No enlarged thoracic lymph nodes. No suspicious thyroid  nodule. Postoperative changes of left breast mastectomy.     Central airways are patent. No pleural effusion or pneumothorax. No  evidence of infection in the chest.     4 mm right middle lobe nodule on image 98 series 8, and 5 mm left  lower lobe nodule  on image 97 series 8 are stable. No new lung nodule.     Abdomen and pelvis: The liver, spleen, adrenal glands, and pancreas  are normal. The gallbladder is decompressed with the patient's small  calcified gallstones. No hydronephrosis, renal mass, or  nephrolithiasis. No hydroureter. Urinary bladder is decompressed.     Unchanged myomatous uterus. No enlarged inguinal, pelvic, or  intra-abdominal lymph nodes by CT short axis size criteria.     No intraperitoneal free air or free fluid.     Bones and soft tissues: Numerous osseous sclerotic lesions, for  example T7, T8, T9, T11, L1, L3, L5 which are not significantly  changed from the prior CT. Sclerotic expansile lesion in the right  fifth rib. Sclerotic and lucent lesion in the sternum and proximal  right femur. No newly identified osseous sclerotic lesion. Grossly  unchanged sclerotic and expansile iliac bones.         IMPRESSION:   In this patient with history of cancer, no evidence of recurrent or  new metastatic disease:  1a. Unchanged sclerotic and lytic/sclerotic lesions with no  demonstration FDG uptake on 2017 exam, compatible with treated osseous  metastatic disease.  1b. Stable sub-6 mm pulmonary nodules.  2. Multiple uterine fibroids.  3. 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     LONG SUTTON MD      Assessment and Plan:     1. Metastatic breast cancer, ER, [place], HER2+ positive: Was last treated in [facility] with Herceptin. We have continued Herceptin every 3 weeks as well as daily tamoxifen. She tolerates treatment extremely well without overt symptoms aside from some fatigue for 2 days after Herceptin. CT CAP on 10/118 with stable disease. Echocardiogram  9/13/2018 with EF 55-60% and normal LV function.  Continue Herceptin every 3 weeks. Continue Tamoxifen.   --Scheduled for next cycle on 10/23. Follow up with Dr. Aguiar on 11/13 for Herceptin and Xgeva  --Encouraged her to continue annual  gynecologic exam with PCP, Dr. Webb. She will call to schedule an appointment as last exam was August 2017    2. Bone metastasis: Continue xgeva every 6 weeks. On calcium + vitamin D. Xgeva due today. Calcium corrects to WNL. Will give today     3. LUE lymphedema: She completed lymphedema treatment and has a sleeve to use now.     4. Insomnia: She reduced mirtazipine to 1/4 tablet at bedtime due to excessive somnolence. She is not taking every night. Discussed trial of melatonin at bedtime to see if this improves somnolence next morning. She is willing to try. Rx for melatonin 3mg at bedtime prn sent.    5. Health maintenance: Flu shot will be given today    Addendum:   Results for NATASHA LOUIS (MRN [id number]) as of 10/2/2018 14:57   Ref. Range 8/22/2018 16:09 9/13/2018 12:01 9/13/2018 12:39 10/1/2018 10:25 10/2/2018 10:18   CA 27-29 Latest Ref Range: 0 - 39 U/mL 11  12  10     CEA Latest Ref Range: 0 - 2.5 ug/L 0.5  0.6  <0.5     CEA and CA 27-29 stable    Jossie Sparrow PA-C  [facility]  [address] [place], [place] [address] [phone]      SEMAJ Arnold

## 2018-10-02 NOTE — NURSING NOTE
"Oncology Rooming Note    October 2, 2018 10:26 AM   Hoda Brush is a 63 year old female who presents for:    Chief Complaint   Patient presents with     Port Draw     Labs drawn via port by RN. VS taken. Pt checked in for next appt     Oncology Clinic Visit     Return visit related to Breast cancer     Initial Vitals: /80 (BP Location: Right arm, Patient Position: Sitting, Cuff Size: Adult Regular)  Pulse 81  Temp 98.1  F (36.7  C) (Oral)  Resp 16  Ht 1.575 m (5' 2.01\")  Wt 83.6 kg (184 lb 3.2 oz)  SpO2 98%  BMI 33.68 kg/m2 Estimated body mass index is 33.68 kg/(m^2) as calculated from the following:    Height as of this encounter: 1.575 m (5' 2.01\").    Weight as of this encounter: 83.6 kg (184 lb 3.2 oz). Body surface area is 1.91 meters squared.  No Pain (0) Comment: Data Unavailable   No LMP recorded. Patient is postmenopausal.  Allergies reviewed: Yes  Medications reviewed: Yes    Medications: Medication refills not needed today.  Pharmacy name entered into [employer]    [id number] [place] [facility] - [place], [place] - [address] [id number] - [place], [place] - [address] AT [facility] [address] [place] [address] DRUG STORE [id number] - [place], [place] - [address] AT  [address] [facility]    Clinical concerns: No new concerns. Provider was notified.    10 minutes for nursing intake (face to face time)     Maria Fernanda Zhagn LPN            "

## 2018-10-02 NOTE — PROGRESS NOTES
Infusion Nursing Note:  Hoda Brush presents today for cycle 19, day 1 herceptin, xgeva, flu shot.    Patient seen by provider today: Yes: SEMAJ Escobar   present during visit today: yes, Welsh    Note: Patient confirms that she is taking calcium with vitamin D.    Intravenous Access:  Implanted Port.    Treatment Conditions:  Lab Results   Component Value Date    HGB 11.3 10/02/2018     Lab Results   Component Value Date    WBC 6.2 10/02/2018      Lab Results   Component Value Date    ANEU 3.3 10/02/2018     Lab Results   Component Value Date     10/02/2018      Lab Results   Component Value Date     10/02/2018                   Lab Results   Component Value Date    POTASSIUM 3.8 10/02/2018           No results found for: MAG         Lab Results   Component Value Date    CR 0.73 10/02/2018                   Lab Results   Component Value Date    TAYLER 8.2 10/02/2018                Lab Results   Component Value Date    BILITOTAL 0.2 10/02/2018           Lab Results   Component Value Date    ALBUMIN 3.0 10/02/2018                    Lab Results   Component Value Date    ALT 33 10/02/2018           Lab Results   Component Value Date    AST 30 10/02/2018       Results reviewed, labs MET treatment parameters, ok to proceed with treatment.  ECHO/MUGA completed 9/13/18  EF 55-60%.      Post Infusion Assessment:  Patient tolerated infusion without incident.  Patient tolerated injection without incident. Xgeva given subcutaneously into right arm  Blood return noted pre and post infusion.  Site patent and intact, free from redness, edema or discomfort.  No evidence of extravasations.  Access discontinued per protocol.    Discharge Plan:   Patient declined prescription refills.  Discharge instructions reviewed with: Patient.  Patient and/or family verbalized understanding of discharge instructions and all questions answered.  Copy of AVS reviewed with patient and/or family.  Patient will  "return 10/23 for next appointment.  Patient discharged in stable condition accompanied by: self.  Departure Mode: Ambulatory.  Face to Face time: 0.    Injectable Influenza Immunization Documentation    1.  Has the patient received the information for the injectable influenza vaccine? YES     2. Is the patient 6 months of age or older? YES     3. Does the patient have any of the following contraindications?         Severe allergy to eggs? No     Severe allergic reaction to previous influenza vaccines? No   Severe allergy to latex? No       History of Guillain-Linden syndrome? No     Currently have a temperature greater than 100.4F? No        4.  Severely egg allergic patients should have flu vaccine eligibility assessed by an MD, RN, or pharmacist, and those who received flu vaccine should be observed for 15 min by an MD, RN, Pharmacist, Medical Technician, or member of clinic staff.\": NA    5. Latex-allergic patients should be given latex-free influenza vaccine NA Please reference the Vaccine latex table to determine if your clinic s product is latex-containing.       Vaccination given by Ashlyn Banda RN, Given IM into right deltoid. Tolerated without incident.       Ashlyn Banda RN                        "

## 2018-10-02 NOTE — TELEPHONE ENCOUNTER
I called to tell her the results of the CT CAP.  No answer.  Will try again later.    Lisandro Aguiar MD

## 2018-10-02 NOTE — NURSING NOTE
Chief Complaint   Patient presents with     Port Draw     Labs drawn via port by RN. VS taken. Pt checked in for next appt     Port accessed with 20g gripper needle by RN, labs collected, line flushed with saline and heparin.  Vitals taken. Pt checked in for appointment(s).    Jeanna SHERIFF RN PHN BSN  BMT/Oncology Lab

## 2018-10-03 ENCOUNTER — TELEPHONE (OUTPATIENT)
Dept: ONCOLOGY | Facility: CLINIC | Age: 63
End: 2018-10-03

## 2018-10-03 NOTE — TELEPHONE ENCOUNTER
I called with the  and discussed with her daughter, who does speak English, that the CT showed stable findings, which is good news.  All of her questions were answered.    Lisandro Aguiar MD

## 2018-10-23 ENCOUNTER — INFUSION THERAPY VISIT (OUTPATIENT)
Dept: ONCOLOGY | Facility: CLINIC | Age: 63
End: 2018-10-23
Attending: INTERNAL MEDICINE
Payer: COMMERCIAL

## 2018-10-23 ENCOUNTER — APPOINTMENT (OUTPATIENT)
Dept: LAB | Facility: CLINIC | Age: 63
End: 2018-10-23
Attending: INTERNAL MEDICINE
Payer: COMMERCIAL

## 2018-10-23 VITALS
TEMPERATURE: 97.9 F | HEART RATE: 80 BPM | RESPIRATION RATE: 18 BRPM | SYSTOLIC BLOOD PRESSURE: 121 MMHG | WEIGHT: 182.9 LBS | DIASTOLIC BLOOD PRESSURE: 54 MMHG | BODY MASS INDEX: 33.44 KG/M2 | OXYGEN SATURATION: 100 %

## 2018-10-23 DIAGNOSIS — C50.912 MALIGNANT NEOPLASM OF LEFT FEMALE BREAST, UNSPECIFIED ESTROGEN RECEPTOR STATUS, UNSPECIFIED SITE OF BREAST (H): Primary | ICD-10-CM

## 2018-10-23 LAB
ALBUMIN SERPL-MCNC: 3.1 G/DL (ref 3.4–5)
ALP SERPL-CCNC: 37 U/L (ref 40–150)
ALT SERPL W P-5'-P-CCNC: 29 U/L (ref 0–50)
ANION GAP SERPL CALCULATED.3IONS-SCNC: 7 MMOL/L (ref 3–14)
AST SERPL W P-5'-P-CCNC: 24 U/L (ref 0–45)
BASOPHILS # BLD AUTO: 0 10E9/L (ref 0–0.2)
BASOPHILS NFR BLD AUTO: 0.4 %
BILIRUB SERPL-MCNC: 0.2 MG/DL (ref 0.2–1.3)
BUN SERPL-MCNC: 12 MG/DL (ref 7–30)
CALCIUM SERPL-MCNC: 8.2 MG/DL (ref 8.5–10.1)
CANCER AG27-29 SERPL-ACNC: 9 U/ML (ref 0–39)
CEA SERPL-MCNC: <0.5 UG/L (ref 0–2.5)
CHLORIDE SERPL-SCNC: 108 MMOL/L (ref 94–109)
CO2 SERPL-SCNC: 27 MMOL/L (ref 20–32)
CREAT SERPL-MCNC: 0.78 MG/DL (ref 0.52–1.04)
DIFFERENTIAL METHOD BLD: ABNORMAL
EOSINOPHIL # BLD AUTO: 0.2 10E9/L (ref 0–0.7)
EOSINOPHIL NFR BLD AUTO: 3.6 %
ERYTHROCYTE [DISTWIDTH] IN BLOOD BY AUTOMATED COUNT: 13 % (ref 10–15)
GFR SERPL CREATININE-BSD FRML MDRD: 75 ML/MIN/1.7M2
GLUCOSE SERPL-MCNC: 139 MG/DL (ref 70–99)
HCT VFR BLD AUTO: 35.6 % (ref 35–47)
HGB BLD-MCNC: 11.3 G/DL (ref 11.7–15.7)
IMM GRANULOCYTES # BLD: 0 10E9/L (ref 0–0.4)
IMM GRANULOCYTES NFR BLD: 0 %
LYMPHOCYTES # BLD AUTO: 2.7 10E9/L (ref 0.8–5.3)
LYMPHOCYTES NFR BLD AUTO: 39.6 %
MCH RBC QN AUTO: 30.4 PG (ref 26.5–33)
MCHC RBC AUTO-ENTMCNC: 31.7 G/DL (ref 31.5–36.5)
MCV RBC AUTO: 96 FL (ref 78–100)
MONOCYTES # BLD AUTO: 0.3 10E9/L (ref 0–1.3)
MONOCYTES NFR BLD AUTO: 4.7 %
NEUTROPHILS # BLD AUTO: 3.5 10E9/L (ref 1.6–8.3)
NEUTROPHILS NFR BLD AUTO: 51.7 %
NRBC # BLD AUTO: 0 10*3/UL
NRBC BLD AUTO-RTO: 0 /100
PLATELET # BLD AUTO: 183 10E9/L (ref 150–450)
POTASSIUM SERPL-SCNC: 3.6 MMOL/L (ref 3.4–5.3)
PROT SERPL-MCNC: 7.2 G/DL (ref 6.8–8.8)
RBC # BLD AUTO: 3.72 10E12/L (ref 3.8–5.2)
SODIUM SERPL-SCNC: 142 MMOL/L (ref 133–144)
WBC # BLD AUTO: 6.8 10E9/L (ref 4–11)

## 2018-10-23 PROCEDURE — 25000128 H RX IP 250 OP 636: Mod: ZF | Performed by: INTERNAL MEDICINE

## 2018-10-23 PROCEDURE — 86300 IMMUNOASSAY TUMOR CA 15-3: CPT | Performed by: PHYSICIAN ASSISTANT

## 2018-10-23 PROCEDURE — 96413 CHEMO IV INFUSION 1 HR: CPT

## 2018-10-23 PROCEDURE — 85025 COMPLETE CBC W/AUTO DIFF WBC: CPT | Performed by: PHYSICIAN ASSISTANT

## 2018-10-23 PROCEDURE — 82378 CARCINOEMBRYONIC ANTIGEN: CPT | Performed by: PHYSICIAN ASSISTANT

## 2018-10-23 PROCEDURE — 80053 COMPREHEN METABOLIC PANEL: CPT | Performed by: PHYSICIAN ASSISTANT

## 2018-10-23 PROCEDURE — 25000128 H RX IP 250 OP 636: Mod: ZF | Performed by: PHYSICIAN ASSISTANT

## 2018-10-23 RX ORDER — HEPARIN SODIUM (PORCINE) LOCK FLUSH IV SOLN 100 UNIT/ML 100 UNIT/ML
5 SOLUTION INTRAVENOUS EVERY 8 HOURS
Status: DISCONTINUED | OUTPATIENT
Start: 2018-10-23 | End: 2018-10-23 | Stop reason: HOSPADM

## 2018-10-23 RX ADMIN — TRASTUZUMAB 450 MG: 150 INJECTION, POWDER, LYOPHILIZED, FOR SOLUTION INTRAVENOUS at 15:24

## 2018-10-23 RX ADMIN — Medication 5 ML: at 15:55

## 2018-10-23 RX ADMIN — Medication 5 ML: at 14:19

## 2018-10-23 ASSESSMENT — PAIN SCALES - GENERAL: PAINLEVEL: NO PAIN (0)

## 2018-10-23 NOTE — NURSING NOTE
Chief Complaint   Patient presents with     Port Draw     Labs drawn via PORT by RN. Line flushed with saline and heparin. VS taken.      Karina Gant RN

## 2018-10-23 NOTE — PROGRESS NOTES
Infusion Nursing Note:  Hoda Brush presents today for C20 Herceptin.    Patient seen by provider today: No    Treatment Conditions:  Lab Results   Component Value Date    HGB 11.3 10/23/2018     Lab Results   Component Value Date    WBC 6.8 10/23/2018      Lab Results   Component Value Date    ANEU 3.5 10/23/2018     Lab Results   Component Value Date     10/23/2018    ECHO: 9/13: 59%    Intravenous Access:  Implanted Port.  Access dc'd at time of discharge.      Note:   Results reviewed, copy given to patient.  Proceed with treatment.    Copy of AVS given to patient. + Blood return from PORT pre and post infusion.  Tolerated infusion without incident. No Prescriptions filled today.   D/C in care of self.  Pt will return 11/13 for next appointment.       Susu Seay RN

## 2018-10-23 NOTE — MR AVS SNAPSHOT
After Visit Summary   10/23/2018    Hoda Brush    MRN: [id number]           Patient Information     Date Of Birth          1955        Visit Information        Provider Department      10/23/2018 2:30 PM [place] [employer];  29 ATC;  ONCOLOGY INFUSION [facility]        Today's Diagnoses     Malignant neoplasm of left female breast, unspecified estrogen receptor status, unspecified site of breast (H)    -  1      Care Instructions    Contact Numbers  [facility]: [phone]    After Hours:  [phone]  Triage: [phone]    Please call the [facility] Triage line if you experience a temperature greater than or equal to 100.5, shaking chills, have uncontrolled nausea, vomiting and/or diarrhea, dizziness, shortness of breath, chest pain, bleeding, unexplained bruising, or if you have any other new/concerning symptoms, questions or concerns.     If it is after hours, weekends, or holidays, please call the main hospital  at  [phone] and ask to speak to the Oncology doctor on call.     If you are having any concerning symptoms or wish to speak to a provider before your next infusion visit, please call your care coordinator or triage to notify them so we can adequately serve you.     If you need a refill on a narcotic prescription or other medication, please call triage before your infusion appointment.             Follow-ups after your visit        Your next 10 appointments already scheduled     Nov 13, 2018 12:30 PM CST   [facility] Lab Draw with UC [facility] LAB DRAW   [facility] Lab Draw ([facility])    [address] [place] [address]  [address] [place] [id number] [phone]            Nov 13, 2018  1:00 PM CST   (Arrive by 12:45 PM)   Return Visit with Lisandro Aguiar MD   [facility] ([facility])    [address] [place] [address]  [address] [place] [address]  [id number] [phone]            Nov 13, 2018  2:00 PM CST   Infusion 60 with UC ONCOLOGY INFUSION, UC 15 ATC   [facility] ([facility])    [address]  [address]  [facility] [id number] [phone]              Who to contact     If you have questions or need follow up information about today's clinic visit or your schedule please contact [facility] directly at [phone].  Normal or non-critical lab and imaging results will be communicated to you by StockTwitshart, letter or phone within 4 business days after the clinic has received the results. If you do not hear from us within 7 days, please contact the clinic through [email or web address] or phone. If you have a critical or abnormal lab result, we will notify you by phone as soon as possible.  Submit refill requests through [email or web address] or call your pharmacy and they will forward the refill request to us. Please allow 3 business days for your refill to be completed.          Additional Information About Your Visit        StockTwitsharSkyWire Information     [email or web address] gives you secure access to your electronic health record. If you see a primary care provider, you can also send messages to your care team and make appointments. If you have questions, please call your primary care clinic.  If you do not have a primary care provider, please call [phone] and they will assist you.        Care EveryWhere ID     This is your Care EveryWhere ID. This could be used by other organizations to access your [place] medical records  [id number]        Your Vitals Were     Pulse Temperature Respirations Pulse Oximetry BMI (Body Mass Index)       80 97.9  F (36.6  C) (Oral) 18 100% 33.44 kg/m2        Blood Pressure from Last 3 Encounters:   10/23/18 121/54   10/02/18 122/80   09/13/18 108/68    Weight from Last 3 Encounters:   10/23/18 83 kg (182 lb 14.4 oz)   10/02/18 83.6 kg (184 lb 3.2 oz)   09/13/18 82.7 kg (182 lb 6.4 oz)               We Performed the Following     CA 27.29 Breast tumor marker     CBC with platelets differential     CEA     Comprehensive metabolic panel        Primary Care Provider Fax #    Physician No Ref-Primary [phone]       No address on file        Equal Access to Services     RICHARDTONEY TAYLOR : Luz Marina kamran casanova malini Ramsey, ro jefferson, jayneta kasteven wilkins, alexa marcanonba beltran. So [facility] [phone].    ATENCIÓN: Si habla español, tiene a lagunas disposición servicios gratuitos de asistencia lingüística. Llame al [phone].    We comply with applicable federal civil rights laws and [place] laws. We do not discriminate on the basis of race, color, national origin, age, disability, sex, sexual orientation, or gender identity.            Thank you!     Thank you for choosing [facility]  for your care. Our goal is always to provide you with excellent care. Hearing back from our patients is one way we can continue to improve our services. Please take a few minutes to complete the written survey that you may receive in the mail after your visit with us. Thank you!             Your Updated Medication List - Protect others around you: Learn how to safely use, store and throw away your medicines at www.disposemymeds.org.          This list is accurate as of 10/23/18  3:36 PM.  Always use your most recent med list.                   Brand Name Dispense Instructions for use Diagnosis    melatonin 3 MG tablet     30 tablet    Take 1 tablet (3 mg) by mouth nightly as needed for sleep    Primary insomnia       order for DME     1 each    L UE class 2 compression sleeve and gauntlet Night garment Bandaging supplies    Lymphedema of left upper extremity       order for DME     2 Piece    Equipment being ordered: 2 Mastectomy bras and 1 prosthetheses.    Malignant neoplasm of left female breast, unspecified estrogen receptor status, unspecified site of breast (H)        tamoxifen 20 MG tablet    NOLVADEX    90 tablet    Take 1 tablet (20 mg) by mouth daily    Cancer of central portion of left breast (H)       VITAMIN D (CHOLECALCIFEROL) PO      Take 1,000 Units by mouth daily

## 2018-10-23 NOTE — PATIENT INSTRUCTIONS
Contact Numbers  [facility]: [phone]    After Hours:  [phone]  Triage: [phone]    Please call the [facility] Triage line if you experience a temperature greater than or equal to 100.5, shaking chills, have uncontrolled nausea, vomiting and/or diarrhea, dizziness, shortness of breath, chest pain, bleeding, unexplained bruising, or if you have any other new/concerning symptoms, questions or concerns.     If it is after hours, weekends, or holidays, please call the main hospital  at  [phone] and ask to speak to the Oncology doctor on call.     If you are having any concerning symptoms or wish to speak to a provider before your next infusion visit, please call your care coordinator or triage to notify them so we can adequately serve you.     If you need a refill on a narcotic prescription or other medication, please call triage before your infusion appointment.

## 2018-11-11 NOTE — PROGRESS NOTES
Hoda is seen with a Citizen of [place] . The patient is a refugee from [facility] and is here for continuation of care for her metastatic ER+HER2- breast cancer.  She is a [place] refugee from [facility] and was seen in clinic with her daughter.  The only data we have from [facility] is an English translation of her records.       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  On 02/11/2014, she was seen by an oncologist.  The clinical staging of T4b N2 M1 was noted.   Her breast cancer was on the left side. There was no right breast cancer, confirmed by the patient and her daughter, correcting a possible error in the translated records.  ER was positive in 100% of the cells, LA at 14% and HER2 was 3+ positive.  It appears that this histopathologic information is on the mastectomy specimen. She underwent an MRI for staging of presumptive bone metastases which was performed 03/02/2014.  There were skeletal metastases in the thoracic vertebrae at 12, L1, L3, L5 and S1 vertebral body, ranging in size from 0.7-3.0 cm in size.  Ischial and right femur were also involved, as well as a large iliac mass measuring about 15 cm in the uterus. 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   Radiation Oncology consultation was performed 03/11/2014, and she was given radiation in 1 dose of 6 Gy to the right iliac lesion.        With the initial diagnosis, she initiated treatment with 6 cycles of CAF neoadjuvant chemotherapy 02/14, 07/07, 03/28, 04/18/14, 05/08 and 05/29/14. She also received monthly zoledronic acid.  She then underwent a modified left mastectomy of Palacios type and left lymphadenoectomy on 06/27/2014.   Pathologic examination showed number at [facility] was [id number] showed infiltrative grade 2 ductal cancer with 6 level 1 metastatic lymph nodes and 3 level 2 metastatic lymph nodes.  The differentiation was intermediate.  The tumor was ER  positive 70% of the cells, MT positive in 40% of the cells and HER2 was 3+ by immunohistochemistry and the Ki-67 labeling index was 20%. Her staging after surgery was stage IV, pT4b N2 M1.  I don't see a biopsy of the skeletal metastases.  She then had continuation with chemotherapy with 4 cycles of Herceptin and taxane with monthly zoledronic acid.  Tamoxifen was initiated.  She then had two years of Herceptin and a decision was made not to continue further Herceptin.  She continued on zoledronic acid every 3 months. She was clinically stable. [place] then [place] new [place] from [facility] [place].  She arrived last month, established [place] residency and now seeks further oncology care.       TREATMENT HISTORY:  A. Initial diagnosis with metastatic breast cancer in [facility].  Neoadjuvant CAF x 6.   B. Left mastectomy. Left axillary node dissection.  C.  Radiation in 1 dose to R iliac region. g Gy.  C. Herceptin for 2 years taxane for a prescribed course then stopped, monthly zoledronic acid.  She had 2 years of Herceptin with tamoxifen added after chemotherapy.  D.  Tamoxifen alone and zoledronic acid every 3 months.  E.  Move to [facility].  We restarted Herceptin every 3 weeks and continued tamoxifen. Bone targeted agent changed to denosumab every 9 weeks.         Hoda returns to clinic.  She has been feeling entirely well.  She has no pain, no fatigue, no depression, no anxiety.  She had a CT scan on 10/01/2018, which showed stable disease.  She continues on tamoxifen and Herceptin.  She has no problems with vaginal bleeding or hot flashes on the tamoxifen.      REVIEW OF SYSTEMS:  She denies fevers or chills, cough, chest pain, shortness of breath, nausea, vomiting, constipation, diarrhea, bone pain, back pain or headache.  The remainder of a 10 point review of systems is negative.  She has been eating a healthful diet.  She is not exercising.  I did recommend that she walk indoors, perhaps at a mall.       She is taking vitamin D and calcium.      PHYSICAL EXAMINATION:   VITAL SIGNS:  Blood pressure 135/67, temperature 98.1, pulse 86, respirations 16, O2 sat 95% on room air and weight 82.5 kg.   GENERAL:  Hoda appears generally well.  She has no alopecia.   HEENT:  Oropharynx is without lesions.   LYMPH:  There is no palpable cervical, supraclavicular, subclavicular or axillary lymphadenopathy.   BREASTS:  Examination of the right breast reveals no masses.  Examination of the left anterior chest wall reveals a well-healed mastectomy incision without erythema or masses.   LUNGS:  Clear to percussion and auscultation.   HEART:  Regular rate and rhythm, S1, S2.   ABDOMEN:  Soft and nontender without hepatosplenomegaly.   EXTREMITIES:  Without edema.   PSYCHIATRIC:  Mood and affect are normal.      LABORATORY DATA:  The CBC and CMP were within normal limits, except albumin was 3.3.      ASSESSMENT AND PLAN:     1.  Hoda Brush is a 63-year-old woman with a history of ER-positive, WY-positive, HER-2 positive breast cancer.  She is from [facility], [place] from [place] [facility], and came [place]  She lives with her daughter and is here for continuation of every 3 week Herceptin, which she receives along with tamoxifen.  Her tumor is ER positive, WY positive, HER-2 positive.  She had metastatic disease at the time of presentation with bone-only metastases by report.  She presented with metastatic disease in 02/2014.  Staging was initially bone-only metastases by report.  She did her staging in 02/2014 that showed that she had stage IV disease, T4N2M1 invasive ductal carcinoma of the left breast.  She had a right hip metastasis.  She underwent neoadjuvant CAF, left mastectomy, left axillary lymph node dissection and radiation.  She had radiation of the right iliac region where she had metastatic disease.  Pathology report from [facility] shows the tumor to be positive for ER in 75% of the cells, positive for WY in 40%  of the cells, and the HER-2 was 3+ positive by immunohistochemistry.  The Ki-67 was 20%.  She had no evidence of nonbone metastatic disease on her PET/CT scan from 08/2017.     2.  She had insomnia responsive to mirtazapine.   3.  Restaging was stable on 10/01/2018.  The plan is to repeat staging at a 3 month interval.   4.  Discussion of diet and exercise.  She is eating a healthful diet, and she does exercise some.   5.  Discussion of bone health.  She will continue with calcium and vitamin D.   6.  Discussion of tamoxifen treatment.  We will continue the tamoxifen indefinitely given that she has metastatic disease.   7.  Followup.  We will see Hoda in followup in our clinic in 3 weeks with a followup visit with Jossie Sparrow. Follow up with Herceptin CBC, CMP with Jossie and echocardiogram 12-4 and follow up Herceptin 12-24 with no provider.  Follow up with me on 1-15-19 with Herceptin CBC, CMP, CA27.29 and CEA. CT CAP on 1-14-19.       Thank you for allowing us to continue to participate in Hoda Brush's care.      Lisandro Aguiar MD      [facility]             I spent 30 minutes with the patient more than 50% of which was in counseling and coordination of care.

## 2018-11-13 ENCOUNTER — ONCOLOGY VISIT (OUTPATIENT)
Dept: ONCOLOGY | Facility: CLINIC | Age: 63
End: 2018-11-13
Attending: INTERNAL MEDICINE
Payer: COMMERCIAL

## 2018-11-13 VITALS
TEMPERATURE: 98.1 F | HEART RATE: 86 BPM | RESPIRATION RATE: 16 BRPM | SYSTOLIC BLOOD PRESSURE: 135 MMHG | BODY MASS INDEX: 33.24 KG/M2 | DIASTOLIC BLOOD PRESSURE: 67 MMHG | OXYGEN SATURATION: 95 % | WEIGHT: 181.8 LBS

## 2018-11-13 DIAGNOSIS — C50.912 MALIGNANT NEOPLASM OF LEFT FEMALE BREAST, UNSPECIFIED ESTROGEN RECEPTOR STATUS, UNSPECIFIED SITE OF BREAST (H): Primary | ICD-10-CM

## 2018-11-13 DIAGNOSIS — C50.912 MALIGNANT NEOPLASM OF LEFT FEMALE BREAST, UNSPECIFIED ESTROGEN RECEPTOR STATUS, UNSPECIFIED SITE OF BREAST (H): ICD-10-CM

## 2018-11-13 DIAGNOSIS — Z51.11 ENCOUNTER FOR ANTINEOPLASTIC CHEMOTHERAPY: ICD-10-CM

## 2018-11-13 DIAGNOSIS — C79.51 BONE METASTASIS: Primary | ICD-10-CM

## 2018-11-13 LAB
ALBUMIN SERPL-MCNC: 3.3 G/DL (ref 3.4–5)
ALP SERPL-CCNC: 38 U/L (ref 40–150)
ALT SERPL W P-5'-P-CCNC: 29 U/L (ref 0–50)
ANION GAP SERPL CALCULATED.3IONS-SCNC: 6 MMOL/L (ref 3–14)
AST SERPL W P-5'-P-CCNC: 24 U/L (ref 0–45)
BASOPHILS # BLD AUTO: 0 10E9/L (ref 0–0.2)
BASOPHILS NFR BLD AUTO: 0.4 %
BILIRUB SERPL-MCNC: 0.3 MG/DL (ref 0.2–1.3)
BUN SERPL-MCNC: 17 MG/DL (ref 7–30)
CALCIUM SERPL-MCNC: 8.5 MG/DL (ref 8.5–10.1)
CANCER AG27-29 SERPL-ACNC: 5 U/ML (ref 0–39)
CEA SERPL-MCNC: <0.5 UG/L (ref 0–2.5)
CHLORIDE SERPL-SCNC: 107 MMOL/L (ref 94–109)
CO2 SERPL-SCNC: 27 MMOL/L (ref 20–32)
CREAT SERPL-MCNC: 0.82 MG/DL (ref 0.52–1.04)
DIFFERENTIAL METHOD BLD: NORMAL
EOSINOPHIL # BLD AUTO: 0.2 10E9/L (ref 0–0.7)
EOSINOPHIL NFR BLD AUTO: 2.3 %
ERYTHROCYTE [DISTWIDTH] IN BLOOD BY AUTOMATED COUNT: 13 % (ref 10–15)
GFR SERPL CREATININE-BSD FRML MDRD: 70 ML/MIN/1.7M2
GLUCOSE SERPL-MCNC: 81 MG/DL (ref 70–99)
HCT VFR BLD AUTO: 37.1 % (ref 35–47)
HGB BLD-MCNC: 11.7 G/DL (ref 11.7–15.7)
IMM GRANULOCYTES # BLD: 0 10E9/L (ref 0–0.4)
IMM GRANULOCYTES NFR BLD: 0.2 %
LYMPHOCYTES # BLD AUTO: 2.7 10E9/L (ref 0.8–5.3)
LYMPHOCYTES NFR BLD AUTO: 25.2 %
MCH RBC QN AUTO: 29.9 PG (ref 26.5–33)
MCHC RBC AUTO-ENTMCNC: 31.5 G/DL (ref 31.5–36.5)
MCV RBC AUTO: 95 FL (ref 78–100)
MONOCYTES # BLD AUTO: 0.6 10E9/L (ref 0–1.3)
MONOCYTES NFR BLD AUTO: 5.2 %
NEUTROPHILS # BLD AUTO: 7.1 10E9/L (ref 1.6–8.3)
NEUTROPHILS NFR BLD AUTO: 66.7 %
NRBC # BLD AUTO: 0 10*3/UL
NRBC BLD AUTO-RTO: 0 /100
PLATELET # BLD AUTO: 180 10E9/L (ref 150–450)
POTASSIUM SERPL-SCNC: 4 MMOL/L (ref 3.4–5.3)
PROT SERPL-MCNC: 7.5 G/DL (ref 6.8–8.8)
RBC # BLD AUTO: 3.91 10E12/L (ref 3.8–5.2)
SODIUM SERPL-SCNC: 140 MMOL/L (ref 133–144)
WBC # BLD AUTO: 10.6 10E9/L (ref 4–11)

## 2018-11-13 PROCEDURE — 80053 COMPREHEN METABOLIC PANEL: CPT | Performed by: INTERNAL MEDICINE

## 2018-11-13 PROCEDURE — G0463 HOSPITAL OUTPT CLINIC VISIT: HCPCS | Mod: ZF

## 2018-11-13 PROCEDURE — 25000128 H RX IP 250 OP 636: Mod: ZF | Performed by: INTERNAL MEDICINE

## 2018-11-13 PROCEDURE — T1013 SIGN LANG/ORAL INTERPRETER: HCPCS | Mod: U3,ZF

## 2018-11-13 PROCEDURE — 96413 CHEMO IV INFUSION 1 HR: CPT

## 2018-11-13 PROCEDURE — 82378 CARCINOEMBRYONIC ANTIGEN: CPT | Performed by: INTERNAL MEDICINE

## 2018-11-13 PROCEDURE — 85025 COMPLETE CBC W/AUTO DIFF WBC: CPT | Performed by: INTERNAL MEDICINE

## 2018-11-13 PROCEDURE — 99214 OFFICE O/P EST MOD 30 MIN: CPT | Mod: ZP | Performed by: INTERNAL MEDICINE

## 2018-11-13 PROCEDURE — 96372 THER/PROPH/DIAG INJ SC/IM: CPT | Mod: 59

## 2018-11-13 PROCEDURE — 86300 IMMUNOASSAY TUMOR CA 15-3: CPT | Performed by: INTERNAL MEDICINE

## 2018-11-13 RX ORDER — EPINEPHRINE 1 MG/ML
0.3 INJECTION, SOLUTION INTRAMUSCULAR; SUBCUTANEOUS EVERY 5 MIN PRN
Status: CANCELLED | OUTPATIENT
Start: 2018-11-13

## 2018-11-13 RX ORDER — ACETAMINOPHEN 325 MG/1
650 TABLET ORAL
Status: CANCELLED | OUTPATIENT
Start: 2018-11-13

## 2018-11-13 RX ORDER — HEPARIN SODIUM (PORCINE) LOCK FLUSH IV SOLN 100 UNIT/ML 100 UNIT/ML
5 SOLUTION INTRAVENOUS EVERY 8 HOURS
Status: CANCELLED | OUTPATIENT
Start: 2018-11-13

## 2018-11-13 RX ORDER — ALBUTEROL SULFATE 90 UG/1
1-2 AEROSOL, METERED RESPIRATORY (INHALATION)
Status: CANCELLED
Start: 2018-11-13

## 2018-11-13 RX ORDER — DIPHENHYDRAMINE HYDROCHLORIDE 50 MG/ML
50 INJECTION INTRAMUSCULAR; INTRAVENOUS
Status: CANCELLED
Start: 2018-11-13

## 2018-11-13 RX ORDER — SODIUM CHLORIDE 9 MG/ML
1000 INJECTION, SOLUTION INTRAVENOUS CONTINUOUS PRN
Status: CANCELLED
Start: 2018-11-13

## 2018-11-13 RX ORDER — ALBUTEROL SULFATE 0.83 MG/ML
2.5 SOLUTION RESPIRATORY (INHALATION)
Status: CANCELLED | OUTPATIENT
Start: 2018-11-13

## 2018-11-13 RX ORDER — LORAZEPAM 2 MG/ML
0.5 INJECTION INTRAMUSCULAR EVERY 4 HOURS PRN
Status: CANCELLED
Start: 2018-11-13

## 2018-11-13 RX ORDER — HEPARIN SODIUM (PORCINE) LOCK FLUSH IV SOLN 100 UNIT/ML 100 UNIT/ML
5 SOLUTION INTRAVENOUS EVERY 8 HOURS
Status: DISCONTINUED | OUTPATIENT
Start: 2018-11-13 | End: 2018-11-13 | Stop reason: HOSPADM

## 2018-11-13 RX ORDER — EPINEPHRINE 0.3 MG/.3ML
0.3 INJECTION SUBCUTANEOUS EVERY 5 MIN PRN
Status: CANCELLED | OUTPATIENT
Start: 2018-11-13

## 2018-11-13 RX ORDER — METHYLPREDNISOLONE SODIUM SUCCINATE 125 MG/2ML
125 INJECTION, POWDER, LYOPHILIZED, FOR SOLUTION INTRAMUSCULAR; INTRAVENOUS
Status: CANCELLED
Start: 2018-11-13

## 2018-11-13 RX ORDER — MEPERIDINE HYDROCHLORIDE 25 MG/ML
25 INJECTION INTRAMUSCULAR; INTRAVENOUS; SUBCUTANEOUS EVERY 30 MIN PRN
Status: CANCELLED | OUTPATIENT
Start: 2018-11-13

## 2018-11-13 RX ORDER — DIPHENHYDRAMINE HCL 25 MG
50 CAPSULE ORAL ONCE
Status: CANCELLED
Start: 2018-11-13

## 2018-11-13 RX ORDER — HEPARIN SODIUM (PORCINE) LOCK FLUSH IV SOLN 100 UNIT/ML 100 UNIT/ML
5 SOLUTION INTRAVENOUS
Status: DISCONTINUED | OUTPATIENT
Start: 2018-11-13 | End: 2018-11-18 | Stop reason: HOSPADM

## 2018-11-13 RX ADMIN — DENOSUMAB 120 MG: 120 INJECTION SUBCUTANEOUS at 14:29

## 2018-11-13 RX ADMIN — SODIUM CHLORIDE, PRESERVATIVE FREE 5 ML: 5 INJECTION INTRAVENOUS at 12:25

## 2018-11-13 RX ADMIN — SODIUM CHLORIDE 250 ML: 9 INJECTION, SOLUTION INTRAVENOUS at 15:02

## 2018-11-13 RX ADMIN — SODIUM CHLORIDE, PRESERVATIVE FREE 5 ML: 5 INJECTION INTRAVENOUS at 15:35

## 2018-11-13 RX ADMIN — TRASTUZUMAB 450 MG: 150 INJECTION, POWDER, LYOPHILIZED, FOR SOLUTION INTRAVENOUS at 15:02

## 2018-11-13 ASSESSMENT — PAIN SCALES - GENERAL: PAINLEVEL: NO PAIN (0)

## 2018-11-13 NOTE — MR AVS SNAPSHOT
After Visit Summary   11/13/2018    Hoda Brush    MRN: [id number]           Patient Information     Date Of Birth          1955        Visit Information        Provider Department      11/13/2018 12:45 PM Sheeba Cifuentes; Lisandro Aguiar MD [facility]        Today's Diagnoses     Bone metastasis (H)    -  1    Malignant neoplasm of left female breast, unspecified estrogen receptor status, unspecified site of breast (H)        Encounter for antineoplastic chemotherapy           Follow-ups after your visit        Your next 10 appointments already scheduled     Dec 05, 2018  1:00 PM CST   Ech Complete with [email or web address]   [facility] Echo ([facility])    [address] [place] [address]  3rd Floor  [facility] [id number] [phone]           1.  Please bring or wear a comfortable two-piece outfit. 2.  You may eat, drink and take your normal medicines. 3.  For any questions that cannot be answered, please contact the ordering physician 4.  Please do not wear perfumes or scented lotions on the day of your exam.            Dec 05, 2018  2:15 PM CST   Lab with  LAB   [facility] Lab ([facility])    [address] [place] [address]  [employer] [address]   [phone]            Dec 05, 2018  2:50 PM CST   (Arrive by 2:35 PM)   Return Visit with SEMAJ Eddy   [facility] ([facility])    [address] [place] [address]  [address]  [facility] [address]   [phone]            Dec 05, 2018  4:00 PM CST   Infusion 60 with  ONCOLOGY INFUSION, UC 12 ATC   [facility] ([facility])    [address] [place] [address]  [address]  [facility] [address]   [phone]            Dec 24, 2018  9:00 AM CST   [facility] Lab Draw with  MASONIC LAB DRAW   [facility] Lab Draw ([facility])    [address] [place] [address]  [address]  [facility] [address]   [phone]            Dec 24, 2018  9:30 AM CST   Infusion 60 with UC ONCOLOGY INFUSION, UC 30 ATC   [facility] ([facility])    [address]  [address]  [facility] [address]   [phone]            Rex 15, 2019  2:30 PM CST   [facility] Lab Draw with UC MASONIC LAB DRAW   [facility] Lab Draw ([facility])    [address] [place] [address]  [address]  [facility] [address] [phone]            Rex 15, 2019  3:00 PM CST   (Arrive by 2:45 PM)   Return Visit with Lisandro Aguiar MD   [facility] ([facility])    [address] [place] [address]  [address]  [facility] [address] [phone]              Who to contact     If you have questions or need follow up information about today's clinic visit or your schedule please contact [facility] directly at [phone].  Normal or non-critical lab and imaging results will be communicated to you by The Tot[employer] Grouphart, letter or phone within 4 business days after the clinic has received the results. If you do not hear from us within 7 days, please contact the clinic through Beauty Notedt or phone. If you have a critical or abnormal lab result, we will notify you by phone as soon as possible.  Submit refill requests through tastytrade or call your pharmacy and they will forward the refill request to us. Please allow 3 business days for your refill to be completed.          Additional Information About Your Visit        The Totus Grouphart Information     tastytrade gives you secure access to your electronic health record. If you see a primary care provider, you can also send messages to your care team and make appointments. If you have questions, please call your primary care clinic.  If you do not have a primary care provider, please call [phone] and they will assist you.        Care EveryWhere ID     This is your Care  EveryWhere ID. This could be used by other organizations to access your [place] medical records  [id number]        Your Vitals Were     Pulse Temperature Respirations Pulse Oximetry BMI (Body Mass Index)       86 98.1  F (36.7  C) (Oral) 16 95% 33.24 kg/m2        Blood Pressure from Last 3 Encounters:   11/13/18 135/67   10/23/18 121/54   10/02/18 122/80    Weight from Last 3 Encounters:   11/13/18 82.5 kg (181 lb 12.8 oz)   10/23/18 83 kg (182 lb 14.4 oz)   10/02/18 83.6 kg (184 lb 3.2 oz)               Primary Care Provider Fax #    Physician No Ref-Primary [phone]       No address on file        Equal Access to Services     NOE VAZQUEZ : Luz Marina nayak Sosusanna, waaxda luqadaha, qaybta kaalmada dougyateo, alexa angulo . So [facility] [phone].    ATENCIÓN: Si habla español, tiene a lagunas disposición servicios gratuitos de asistencia lingüística. Llame al [phone].    We comply with applicable federal civil rights laws and [place] laws. We do not discriminate on the basis of race, color, national origin, age, disability, sex, sexual orientation, or gender identity.            Thank you!     Thank you for choosing [facility]  for your care. Our goal is always to provide you with excellent care. Hearing back from our patients is one way we can continue to improve our services. Please take a few minutes to complete the written survey that you may receive in the mail after your visit with us. Thank you!             Your Updated Medication List - Protect others around you: Learn how to safely use, store and throw away your medicines at www.disposemymeds.org.          This list is accurate as of 11/13/18 11:59 PM.  Always use your most recent med list.                   Brand Name Dispense Instructions for use Diagnosis    melatonin 3 MG tablet     30 tablet    Take 1 tablet (3 mg) by mouth nightly as needed for sleep    Primary insomnia       order for DME      1 each    L UE class 2 compression sleeve and gauntlet Night garment Bandaging supplies    Lymphedema of left upper extremity       order for DME     2 Piece    Equipment being ordered: 2 Mastectomy bras and 1 prosthetheses.    Malignant neoplasm of left female breast, unspecified estrogen receptor status, unspecified site of breast (H)       tamoxifen 20 MG tablet    NOLVADEX    90 tablet    Take 1 tablet (20 mg) by mouth daily    Cancer of central portion of left breast (H)       VITAMIN D (CHOLECALCIFEROL) PO      Take 1,000 Units by mouth daily

## 2018-11-13 NOTE — PATIENT INSTRUCTIONS
Contact Numbers    [facility] Main Line: [phone]    Triage Nurse Line: [phone]      Please call the [facility] Nurse Triage line if you experience a temperature greater than or equal to 100.5, shaking chills, have uncontrolled nausea, vomiting and/or diarrhea, dizziness, shortness of breath, bleeding not relieved with pressure, or if you have any other questions or concerns.     If it is after hours, weekends, or holidays, please call the [phone] and a nurse will be able to triage your call.    If you are having any concerning symptoms or wish to speak to a provider before your next infusion visit, please call your care coordinator or triage them so we can adequately serve you.      If you need to refill your narcotic prescription or other medication, please call triage before your infusion appointment.        November 2018 Sunday Monday Tuesday Wednesday Thursday Friday Saturday                       1     2     3       4     5     6     7     8     9     10       11     12     13     [facility] DRAW   12:30 PM   (30 min.)   [facility]   [employer] Lab Draw     [facility] RETURN   12:45 PM   (90 min.)   Lisandro Aguiar MD   [facility] ONC INFUSION 60    1:45 PM   (75 min.)    ONCOLOGY INFUSION   [facility] 14     15     16     17       18     19     20     21     22     23     24       25     26     27     28     29     30                     December 2018 Sunday Monday Tuesday Wednesday Thursday Friday Saturday                                 1       2     3     4     5     [facility] RETURN    2:35 PM   (50 min.)   Jossie Sparrow PA   [facility] ONC INFUSION 60    4:00 PM   (60 min.)    ONCOLOGY INFUSION   [facility] 6     7     8       9     10     11     12     13     14     15       16     17     18     19     20     21     22       23     24     25     26      27     28     29       30     31                                          Recent Results (from the past 24 hour(s))   CBC with platelets differential    Collection Time: 11/13/18 12:32 PM   Result Value Ref Range    WBC 10.6 4.0 - 11.0 10e9/L    RBC Count 3.91 3.8 - 5.2 10e12/L    Hemoglobin 11.7 11.7 - 15.7 g/dL    Hematocrit 37.1 35.0 - 47.0 %    MCV 95 78 - 100 fl    MCH 29.9 26.5 - 33.0 pg    MCHC 31.5 31.5 - 36.5 g/dL    RDW 13.0 10.0 - 15.0 %    Platelet Count 180 150 - 450 10e9/L    Diff Method Automated Method     % Neutrophils 66.7 %    % Lymphocytes 25.2 %    % Monocytes 5.2 %    % Eosinophils 2.3 %    % Basophils 0.4 %    % Immature Granulocytes 0.2 %    Nucleated RBCs 0 0 /100    Absolute Neutrophil 7.1 1.6 - 8.3 10e9/L    Absolute Lymphocytes 2.7 0.8 - 5.3 10e9/L    Absolute Monocytes 0.6 0.0 - 1.3 10e9/L    Absolute Eosinophils 0.2 0.0 - 0.7 10e9/L    Absolute Basophils 0.0 0.0 - 0.2 10e9/L    Abs Immature Granulocytes 0.0 0 - 0.4 10e9/L    Absolute Nucleated RBC 0.0    Comprehensive metabolic panel    Collection Time: 11/13/18 12:32 PM   Result Value Ref Range    Sodium 140 133 - 144 mmol/L    Potassium 4.0 3.4 - 5.3 mmol/L    Chloride 107 94 - 109 mmol/L    Carbon Dioxide 27 20 - 32 mmol/L    Anion Gap 6 3 - 14 mmol/L    Glucose 81 70 - 99 mg/dL    Urea Nitrogen 17 7 - 30 mg/dL    Creatinine 0.82 0.52 - 1.04 mg/dL    GFR Estimate 70 >60 mL/min/1.7m2    GFR Estimate If Black 85 >60 mL/min/1.7m2    Calcium 8.5 8.5 - 10.1 mg/dL    Bilirubin Total 0.3 0.2 - 1.3 mg/dL    Albumin 3.3 (L) 3.4 - 5.0 g/dL    Protein Total 7.5 6.8 - 8.8 g/dL    Alkaline Phosphatase 38 (L) 40 - 150 U/L    ALT 29 0 - 50 U/L    AST 24 0 - 45 U/L   CA 27.29 Breast tumor marker    Collection Time: 11/13/18 12:32 PM   Result Value Ref Range    CA 27-29 5 0 - 39 U/mL   CEA    Collection Time: 11/13/18 12:32 PM   Result Value Ref Range    CEA <0.5 0 - 2.5 ug/L

## 2018-11-13 NOTE — NURSING NOTE
Chief Complaint   Patient presents with     Port Draw     Labs drawn via port by RN, line flushed and hep locked, VS taken     Elan Merida RN

## 2018-11-13 NOTE — PROGRESS NOTES
Infusion Nursing Note:  Hoda Brush presents today for C21D1 Herceptin/ Xgeva.    Patient seen by provider today: Yes: Dr. Aguiar    Note: Patient reports feeling well and has no additional complaints to report since her visit with Dr. Aguiar this afternoon.     Intravenous Access:  Implanted Port.      Treatment Conditions:  Lab Results   Component Value Date    HGB 11.7 11/13/2018     Lab Results   Component Value Date    WBC 10.6 11/13/2018      Lab Results   Component Value Date    ANEU 7.1 11/13/2018     Lab Results   Component Value Date     11/13/2018      Lab Results   Component Value Date     11/13/2018                   Lab Results   Component Value Date    POTASSIUM 4.0 11/13/2018           No results found for: MAG         Lab Results   Component Value Date    CR 0.82 11/13/2018                   Lab Results   Component Value Date    TAYLER 8.5 11/13/2018                Lab Results   Component Value Date    BILITOTAL 0.3 11/13/2018           Lab Results   Component Value Date    ALBUMIN 3.3 11/13/2018                    Lab Results   Component Value Date    ALT 29 11/13/2018           Lab Results   Component Value Date    AST 24 11/13/2018       Results reviewed, labs MET treatment parameters, ok to proceed with treatment.  ECHO/MUGA completed 9/13/18  EF 55-60%.      Post Infusion Assessment:  Patient tolerated infusion without incident.  Patient tolerated one Xgeva injection to right arm without incident.  Blood return noted pre and post infusion.  Site patent and intact, free from redness, edema or discomfort.  No evidence of extravasations.  Access discontinued per protocol.    Discharge Plan:   Patient declined prescription refills.  Discharge instructions reviewed with: Patient and Family.  Patient and/or family verbalized understanding of discharge instructions and all questions answered.  Copy of AVS reviewed with patient and/or family.  Patient will return 12/5/18 to see  Jossie Sparrow and C22D1 Herceptin for next appointment.  Patient wants the appointment changed to 12/4/18.  Message sent to scheduling to address.  Patient discharged in stable condition accompanied by: .  Departure Mode: Ambulatory.    Shaina Moffett RN

## 2018-11-13 NOTE — NURSING NOTE
"Oncology Rooming Note    November 13, 2018 1:29 PM   Hoda Brush is a 63 year old female who presents for:    Chief Complaint   Patient presents with     Port Draw     Labs drawn via port by RN, line flushed and hep locked, VS taken     Oncology Clinic Visit     return - breast ca      Initial Vitals: /67 (BP Location: Right arm, Patient Position: Sitting, Cuff Size: Adult Regular)  Pulse 86  Temp 98.1  F (36.7  C) (Oral)  Resp 16  Wt 82.5 kg (181 lb 12.8 oz)  SpO2 95%  BMI 33.24 kg/m2 Estimated body mass index is 33.24 kg/(m^2) as calculated from the following:    Height as of 10/2/18: 1.575 m (5' 2.01\").    Weight as of this encounter: 82.5 kg (181 lb 12.8 oz). Body surface area is 1.9 meters squared.  No Pain (0) Comment: Data Unavailable   No LMP recorded. Patient is postmenopausal.  Allergies reviewed: Yes  Medications reviewed: Yes    Medications: Medication refills not needed today.  Pharmacy name entered into [employer]:    [id number] - [place], [place] - [address] [id number] [place], [place] - [address] [facility] [address] [place], [place] [id number] - [place], [place] - [address] AT  [address] [place] & [facility]    Clinical concerns: none      6 minutes for nursing intake (face to face time)     Isabel Mcmillan CMA            "

## 2018-11-13 NOTE — LETTER
11/13/2018       RE: Hoda Brush  [address] [address]     Dear Colleague,    Thank you for referring your patient, Hoda Brush, to the [facility]. Please see a copy of my visit note below.    Hoda is seen with a [place]ian . The patient is a refugee from [facility] and is here for continuation of care for her metastatic ER+HER2- breast cancer.  She is a [employer] refugee from [facility] and was seen in clinic with her daughter.  The only data we have from [facility] is an English translation of her records.       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  On 02/11/2014, she was seen by an oncologist.  The clinical staging of T4b N2 M1 was noted.   Her breast cancer was on the left side. There was no right breast cancer, confirmed by the patient and her daughter, correcting a possible error in the translated records.  ER was positive in 100% of the cells, KS at 14% and HER2 was 3+ positive.  It appears that this histopathologic information is on the mastectomy specimen. She underwent an MRI for staging of presumptive bone metastases which was performed 03/02/2014.  There were skeletal metastases in the thoracic vertebrae at 12, L1, L3, L5 and S1 vertebral body, ranging in size from 0.7-3.0 cm in size.  Ischial and right femur were also involved, as well as a large iliac mass measuring about 15 cm in the uterus. 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   Radiation Oncology consultation was performed 03/11/2014, and she was given radiation in 1 dose of 6 Gy to the right iliac lesion.        With the initial diagnosis, she initiated treatment with 6 cycles of CAF neoadjuvant chemotherapy 02/14, 07/07, 03/28, 04/18/14, 05/08 and 05/29/14. She also received monthly zoledronic acid.  She then underwent a modified left mastectomy of Palacios type and left lymphadenoectomy on 06/27/2014.    Pathologic examination showed number at [facility] was [id number] showed infiltrative grade 2 ductal cancer with 6 level 1 metastatic lymph nodes and 3 level 2 metastatic lymph nodes.  The differentiation was intermediate.  The tumor was ER positive 70% of the cells, MI positive in 40% of the cells and HER2 was 3+ by immunohistochemistry and the Ki-67 labeling index was 20%. Her staging after surgery was stage IV, pT4b N2 M1.  I don't see a biopsy of the skeletal metastases.  She then had continuation with chemotherapy with 4 cycles of Herceptin and taxane with monthly zoledronic acid.  Tamoxifen was initiated.  She then had two years of Herceptin and a decision was made not to continue further Herceptin.  She continued on zoledronic acid every 3 months. She was clinically stable. [place] then [place] the [place] new [place] from [facility].  She arrived last month, established [place] residency and now seeks further oncology care.       TREATMENT HISTORY:  A. Initial diagnosis with metastatic breast cancer in [facility].  Neoadjuvant CAF x 6.   B. Left mastectomy. Left axillary node dissection.  C.  Radiation in 1 dose to R iliac region. g Gy.  C. Herceptin for 2 years taxane for a prescribed course then stopped, monthly zoledronic acid.  She had 2 years of Herceptin with tamoxifen added after chemotherapy.  D.  Tamoxifen alone and zoledronic acid every 3 months.  E.  [place]  We restarted Herceptin every 3 weeks and continued tamoxifen. Bone targeted agent changed to denosumab every 9 weeks.         Hoda returns to clinic.  She has been feeling entirely well.  She has no pain, no fatigue, no depression, no anxiety.  She had a CT scan on 10/01/2018, which showed stable disease.  She continues on tamoxifen and Herceptin.  She has no problems with vaginal bleeding or hot flashes on the tamoxifen.      REVIEW OF SYSTEMS:  She denies fevers or chills, cough, chest pain, shortness of breath, nausea,  vomiting, constipation, diarrhea, bone pain, back pain or headache.  The remainder of a 10 point review of systems is negative.  She has been eating a healthful diet.  She is not exercising.  I did recommend that she walk indoors, perhaps at a mall.      She is taking vitamin D and calcium.      PHYSICAL EXAMINATION:   VITAL SIGNS:  Blood pressure 135/67, temperature 98.1, pulse 86, respirations 16, O2 sat 95% on room air and weight 82.5 kg.   GENERAL:  Hoda appears generally well.  She has no alopecia.   HEENT:  Oropharynx is without lesions.   LYMPH:  There is no palpable cervical, supraclavicular, subclavicular or axillary lymphadenopathy.   BREASTS:  Examination of the right breast reveals no masses.  Examination of the left anterior chest wall reveals a well-healed mastectomy incision without erythema or masses.   LUNGS:  Clear to percussion and auscultation.   HEART:  Regular rate and rhythm, S1, S2.   ABDOMEN:  Soft and nontender without hepatosplenomegaly.   EXTREMITIES:  Without edema.   PSYCHIATRIC:  Mood and affect are normal.      LABORATORY DATA:  The CBC and CMP were within normal limits, except albumin was 3.3.      ASSESSMENT AND PLAN:     1.  Hoda Brush is a 63-year-old woman with a history of ER-positive, VT-positive, HER-2 positive breast cancer.  She is from [facility], formerly from [facility], and came [place]  She lives with her daughter and is here for continuation of every 3 week Herceptin, which she receives along with tamoxifen.  Her tumor is ER positive, VT positive, HER-2 positive.  She had metastatic disease at the time of presentation with bone-only metastases by report.  She presented with metastatic disease in 02/2014.  Staging was initially bone-only metastases by report.  She did her staging in 02/2014 that showed that she had stage IV disease, T4N2M1 invasive ductal carcinoma of the left breast.  She had a right hip metastasis.  She underwent neoadjuvant CAF, left  mastectomy, left axillary lymph node dissection and radiation.  She had radiation of the right iliac region where she had metastatic disease.  Pathology report from [facility] shows the tumor to be positive for ER in 75% of the cells, positive for TN in 40% of the cells, and the HER-2 was 3+ positive by immunohistochemistry.  The Ki-67 was 20%.  She had no evidence of nonbone metastatic disease on her PET/CT scan from 08/2017.     2.  She had insomnia responsive to mirtazapine.   3.  Restaging was stable on 10/01/2018.  The plan is to repeat staging at a 3 month interval.   4.  Discussion of diet and exercise.  She is eating a healthful diet, and she does exercise some.   5.  Discussion of bone health.  She will continue with calcium and vitamin D.   6.  Discussion of tamoxifen treatment.  We will continue the tamoxifen indefinitely given that she has metastatic disease.   7.  Followup.  We will see Hoda in followup in our clinic in 3 weeks with a followup visit with Jossie Sparrow. Follow up with Herceptin CBC, CMP with Jossie and echocardiogram 12-4 and follow up Herceptin 12-24 with no provider.  Follow up with me on 1-15-19 with Herceptin CBC, CMP, CA27.29 and CEA. CT CAP on 1-14-19.       Thank you for allowing us to continue to participate in Hoda Brush's care.      Lisandro Aguiar MD      [facility]             I spent 30 minutes with the patient more than 50% of which was in counseling and coordination of care.     Again, thank you for allowing me to participate in the care of your patient.      Sincerely,    Lisandro Aguiar MD

## 2018-11-13 NOTE — MR AVS SNAPSHOT
After Visit Summary   11/13/2018    Hoda Brush    MRN: [id number]           Patient Information     Date Of Birth          1955        Visit Information        Provider Department      11/13/2018 1:45 PM Sheeba Cifuentes;  15 ATC;  ONCOLOGY INFUSION  Services Department        Today's Diagnoses     Malignant neoplasm of left female breast, unspecified estrogen receptor status, unspecified site of breast (H)    -  1      Care Instructions    Contact Numbers    [facility] Main Line: [phone]    Triage Nurse Line: [phone]      Please call the [facility] Nurse Triage line if you experience a temperature greater than or equal to 100.5, shaking chills, have uncontrolled nausea, vomiting and/or diarrhea, dizziness, shortness of breath, bleeding not relieved with pressure, or if you have any other questions or concerns.     If it is after hours, weekends, or holidays, please call the [phone] and a nurse will be able to triage your call.    If you are having any concerning symptoms or wish to speak to a provider before your next infusion visit, please call your care coordinator or triage them so we can adequately serve you.      If you need to refill your narcotic prescription or other medication, please call triage before your infusion appointment.        November 2018 Sunday Monday Tuesday Wednesday Thursday Friday Saturday                       1     2     3       4     5     6     7     8     9     10       11     12     13     [facility]   12:30 PM   (30 min.)   [facility] LAB DRAW   [facility] Lab Draw     [facility] RETURN   12:45 PM   (90 min.)   Lisandro Aguiar MD   [facility] ONC INFUSION 60    1:45 PM   (75 min.)    ONCOLOGY INFUSION   [facility] 14     15     16     17       18     19     20     21     22     23     24       25     26     27     28     29     30                      December 2018 Sunday Monday Tuesday Wednesday Thursday Friday Saturday                                 1       2     3     4     5     [facility] RETURN    2:35 PM   (50 min.)   Jossie Sparrow PA   [facility]     [facility] ONC INFUSION 60    4:00 PM   (60 min.)   [facility] ONCOLOGY INFUSION   [facility] 6     7     8       9     10     11     12     13     14     15       16     17     18     19     20     21     22       23     24     25     26     27     28     29       30     31                                          Recent Results (from the past 24 hour(s))   CBC with platelets differential    Collection Time: 11/13/18 12:32 PM   Result Value Ref Range    WBC 10.6 4.0 - 11.0 10e9/L    RBC Count 3.91 3.8 - 5.2 10e12/L    Hemoglobin 11.7 11.7 - 15.7 g/dL    Hematocrit 37.1 35.0 - 47.0 %    MCV 95 78 - 100 fl    MCH 29.9 26.5 - 33.0 pg    MCHC 31.5 31.5 - 36.5 g/dL    RDW 13.0 10.0 - 15.0 %    Platelet Count 180 150 - 450 10e9/L    Diff Method Automated Method     % Neutrophils 66.7 %    % Lymphocytes 25.2 %    % Monocytes 5.2 %    % Eosinophils 2.3 %    % Basophils 0.4 %    % Immature Granulocytes 0.2 %    Nucleated RBCs 0 0 /100    Absolute Neutrophil 7.1 1.6 - 8.3 10e9/L    Absolute Lymphocytes 2.7 0.8 - 5.3 10e9/L    Absolute Monocytes 0.6 0.0 - 1.3 10e9/L    Absolute Eosinophils 0.2 0.0 - 0.7 10e9/L    Absolute Basophils 0.0 0.0 - 0.2 10e9/L    Abs Immature Granulocytes 0.0 0 - 0.4 10e9/L    Absolute Nucleated RBC 0.0    Comprehensive metabolic panel    Collection Time: 11/13/18 12:32 PM   Result Value Ref Range    Sodium 140 133 - 144 mmol/L    Potassium 4.0 3.4 - 5.3 mmol/L    Chloride 107 94 - 109 mmol/L    Carbon Dioxide 27 20 - 32 mmol/L    Anion Gap 6 3 - 14 mmol/L    Glucose 81 70 - 99 mg/dL    Urea Nitrogen 17 7 - 30 mg/dL    Creatinine 0.82 0.52 - 1.04 mg/dL    GFR Estimate 70 >60 mL/min/1.7m2    GFR Estimate If Black 85 >60 mL/min/1.7m2    Calcium 8.5 8.5 - 10.1  mg/dL    Bilirubin Total 0.3 0.2 - 1.3 mg/dL    Albumin 3.3 (L) 3.4 - 5.0 g/dL    Protein Total 7.5 6.8 - 8.8 g/dL    Alkaline Phosphatase 38 (L) 40 - 150 U/L    ALT 29 0 - 50 U/L    AST 24 0 - 45 U/L   CA 27.29 Breast tumor marker    Collection Time: 11/13/18 12:32 PM   Result Value Ref Range    CA 27-29 5 0 - 39 U/mL   CEA    Collection Time: 11/13/18 12:32 PM   Result Value Ref Range    CEA <0.5 0 - 2.5 ug/L                 Follow-ups after your visit        Your next 10 appointments already scheduled     Dec 05, 2018  2:50 PM CST   (Arrive by 2:35 PM)   Return Visit with SEMAJ Eddy   [facility]    [address] [place] [address]  [address]  [facility] [address] [phone]            Dec 05, 2018  4:00 PM CST   Infusion 60 with [facility] ONCOLOGY INFUSION, [facility] 12 ATC   [facility]    [address]  [address]  [facility] [address] [phone]              Future tests that were ordered for you today     Open Standing Orders        Priority Remaining Interval Expires Ordered    CBC with platelets differential Routine 52/52 11/13/2019 11/13/2018    CEA Routine 52/52  2/11/2019 11/13/2018    Comprehensive metabolic panel Routine 52/52  11/13/2019 11/13/2018    Ca27.29  breast tumor marker Routine 52/52  2/11/2019 11/13/2018          Open Future Orders        Priority Expected Expires Ordered    CT Chest/Abdomen/Pelvis w Contrast Routine  6/11/2019 11/13/2018    Echocardiogram Complete Routine  11/13/2019 11/13/2018            Who to contact     If you have questions or need follow up information about today's clinic visit or your schedule please contact [facility] directly at [phone].  Normal or non-critical lab and imaging results will be communicated to you by MyChart, letter or phone within 4 business days after the clinic has received the results. If you do  not hear from us within 7 days, please contact the clinic through [employer] or phone. If you have a critical or abnormal lab result, we will notify you by phone as soon as possible.  Submit refill requests through [employer] or call your pharmacy and they will forward the refill request to us. Please allow 3 business days for your refill to be completed.          Additional Information About Your Visit        Open[employer] Information     [employer] gives you secure access to your electronic health record. If you see a primary care provider, you can also send messages to your care team and make appointments. If you have questions, please call your primary care clinic.  If you do not have a primary care provider, please call [phone] and they will assist you.        Care EveryWhere ID     This is your Care EveryWhere ID. This could be used by other organizations to access your [facility] medical records  [id number]         Blood Pressure from Last 3 Encounters:   11/13/18 135/67   10/23/18 121/54   10/02/18 122/80    Weight from Last 3 Encounters:   11/13/18 82.5 kg (181 lb 12.8 oz)   10/23/18 83 kg (182 lb 14.4 oz)   10/02/18 83.6 kg (184 lb 3.2 oz)              We Performed the Following     CA 27.29 Breast tumor marker     CBC with platelets differential     CEA     Comprehensive metabolic panel        Primary Care Provider Fax #    Physician No Ref-Primary [phone]       No address on file        Equal Access to Services     NOE VAZQUEZ : Hadii kamran portilloo Sosusanna, waaxda luqadaha, qaybta kaalmada franky, alexa beltran. So [facility] [phone].    ATENCIÓN: Si habla español, tiene a lagunas disposición servicios gratuitos de asistencia lingüística. Luis al [phone].    We comply with applicable federal civil rights laws and [place] laws. We do not discriminate on the basis of race, color, national origin, age, disability, sex, sexual orientation, or gender identity.            Thank  you!     Thank you for choosing [facility]  for your care. Our goal is always to provide you with excellent care. Hearing back from our patients is one way we can continue to improve our services. Please take a few minutes to complete the written survey that you may receive in the mail after your visit with us. Thank you!             Your Updated Medication List - Protect others around you: Learn how to safely use, store and throw away your medicines at www.disposemymeds.org.          This list is accurate as of 11/13/18  3:05 PM.  Always use your most recent med list.                   Brand Name Dispense Instructions for use Diagnosis    melatonin 3 MG tablet     30 tablet    Take 1 tablet (3 mg) by mouth nightly as needed for sleep    Primary insomnia       order for DME     1 each    L UE class 2 compression sleeve and gauntlet Night garment Bandaging supplies    Lymphedema of left upper extremity       order for DME     2 Piece    Equipment being ordered: 2 Mastectomy bras and 1 prosthetheses.    Malignant neoplasm of left female breast, unspecified estrogen receptor status, unspecified site of breast (H)       tamoxifen 20 MG tablet    NOLVADEX    90 tablet    Take 1 tablet (20 mg) by mouth daily    Cancer of central portion of left breast (H)       VITAMIN D (CHOLECALCIFEROL) PO      Take 1,000 Units by mouth daily

## 2018-11-15 ENCOUNTER — TELEPHONE (OUTPATIENT)
Dept: ONCOLOGY | Facility: CLINIC | Age: 63
End: 2018-11-15

## 2018-11-20 DIAGNOSIS — C50.112 CANCER OF CENTRAL PORTION OF LEFT BREAST (H): ICD-10-CM

## 2018-11-22 RX ORDER — TAMOXIFEN CITRATE 20 MG/1
20 TABLET ORAL DAILY
Qty: 90 TABLET | Refills: 3 | Status: SHIPPED | OUTPATIENT
Start: 2018-11-22 | End: 2019-04-13

## 2018-12-05 ENCOUNTER — RADIANT APPOINTMENT (OUTPATIENT)
Dept: CARDIOLOGY | Facility: CLINIC | Age: 63
End: 2018-12-05
Attending: INTERNAL MEDICINE
Payer: COMMERCIAL

## 2018-12-05 ENCOUNTER — ONCOLOGY VISIT (OUTPATIENT)
Dept: ONCOLOGY | Facility: CLINIC | Age: 63
End: 2018-12-05
Attending: PHYSICIAN ASSISTANT
Payer: COMMERCIAL

## 2018-12-05 ENCOUNTER — APPOINTMENT (OUTPATIENT)
Dept: LAB | Facility: CLINIC | Age: 63
End: 2018-12-05
Payer: COMMERCIAL

## 2018-12-05 VITALS
HEIGHT: 62 IN | HEART RATE: 77 BPM | SYSTOLIC BLOOD PRESSURE: 111 MMHG | BODY MASS INDEX: 33.18 KG/M2 | WEIGHT: 180.3 LBS | DIASTOLIC BLOOD PRESSURE: 74 MMHG | OXYGEN SATURATION: 95 % | RESPIRATION RATE: 18 BRPM | TEMPERATURE: 98.4 F

## 2018-12-05 DIAGNOSIS — C50.912 MALIGNANT NEOPLASM OF LEFT FEMALE BREAST, UNSPECIFIED ESTROGEN RECEPTOR STATUS, UNSPECIFIED SITE OF BREAST (H): ICD-10-CM

## 2018-12-05 DIAGNOSIS — C50.912 MALIGNANT NEOPLASM OF LEFT FEMALE BREAST, UNSPECIFIED ESTROGEN RECEPTOR STATUS, UNSPECIFIED SITE OF BREAST (H): Primary | ICD-10-CM

## 2018-12-05 DIAGNOSIS — Z51.11 ENCOUNTER FOR ANTINEOPLASTIC CHEMOTHERAPY: ICD-10-CM

## 2018-12-05 DIAGNOSIS — R07.89 ATYPICAL CHEST PAIN: Primary | ICD-10-CM

## 2018-12-05 DIAGNOSIS — F51.01 PRIMARY INSOMNIA: ICD-10-CM

## 2018-12-05 LAB
ALBUMIN SERPL-MCNC: 3.1 G/DL (ref 3.4–5)
ALP SERPL-CCNC: 39 U/L (ref 40–150)
ALT SERPL W P-5'-P-CCNC: 29 U/L (ref 0–50)
ANION GAP SERPL CALCULATED.3IONS-SCNC: 8 MMOL/L (ref 3–14)
AST SERPL W P-5'-P-CCNC: 26 U/L (ref 0–45)
BASOPHILS # BLD AUTO: 0 10E9/L (ref 0–0.2)
BASOPHILS NFR BLD AUTO: 0.5 %
BILIRUB SERPL-MCNC: 0.1 MG/DL (ref 0.2–1.3)
BUN SERPL-MCNC: 16 MG/DL (ref 7–30)
CALCIUM SERPL-MCNC: 8.5 MG/DL (ref 8.5–10.1)
CANCER AG27-29 SERPL-ACNC: 8 U/ML (ref 0–39)
CEA SERPL-MCNC: 0.7 UG/L (ref 0–2.5)
CHLORIDE SERPL-SCNC: 108 MMOL/L (ref 94–109)
CO2 SERPL-SCNC: 28 MMOL/L (ref 20–32)
CREAT SERPL-MCNC: 0.85 MG/DL (ref 0.52–1.04)
DIFFERENTIAL METHOD BLD: ABNORMAL
EOSINOPHIL # BLD AUTO: 0.2 10E9/L (ref 0–0.7)
EOSINOPHIL NFR BLD AUTO: 3.4 %
ERYTHROCYTE [DISTWIDTH] IN BLOOD BY AUTOMATED COUNT: 13.1 % (ref 10–15)
GFR SERPL CREATININE-BSD FRML MDRD: 67 ML/MIN/1.7M2
GLUCOSE SERPL-MCNC: 114 MG/DL (ref 70–99)
HCT VFR BLD AUTO: 36.5 % (ref 35–47)
HGB BLD-MCNC: 11.3 G/DL (ref 11.7–15.7)
IMM GRANULOCYTES # BLD: 0 10E9/L (ref 0–0.4)
IMM GRANULOCYTES NFR BLD: 0.3 %
LYMPHOCYTES # BLD AUTO: 2.2 10E9/L (ref 0.8–5.3)
LYMPHOCYTES NFR BLD AUTO: 35.5 %
MCH RBC QN AUTO: 29.5 PG (ref 26.5–33)
MCHC RBC AUTO-ENTMCNC: 31 G/DL (ref 31.5–36.5)
MCV RBC AUTO: 95 FL (ref 78–100)
MONOCYTES # BLD AUTO: 0.4 10E9/L (ref 0–1.3)
MONOCYTES NFR BLD AUTO: 6.2 %
NEUTROPHILS # BLD AUTO: 3.4 10E9/L (ref 1.6–8.3)
NEUTROPHILS NFR BLD AUTO: 54.1 %
NRBC # BLD AUTO: 0 10*3/UL
NRBC BLD AUTO-RTO: 0 /100
PLATELET # BLD AUTO: 193 10E9/L (ref 150–450)
POTASSIUM SERPL-SCNC: 4 MMOL/L (ref 3.4–5.3)
PROT SERPL-MCNC: 7.3 G/DL (ref 6.8–8.8)
RBC # BLD AUTO: 3.83 10E12/L (ref 3.8–5.2)
SODIUM SERPL-SCNC: 143 MMOL/L (ref 133–144)
WBC # BLD AUTO: 6.3 10E9/L (ref 4–11)

## 2018-12-05 PROCEDURE — T1013 SIGN LANG/ORAL INTERPRETER: HCPCS | Mod: U3,ZF

## 2018-12-05 PROCEDURE — 80053 COMPREHEN METABOLIC PANEL: CPT | Performed by: PHYSICIAN ASSISTANT

## 2018-12-05 PROCEDURE — 96413 CHEMO IV INFUSION 1 HR: CPT

## 2018-12-05 PROCEDURE — 86300 IMMUNOASSAY TUMOR CA 15-3: CPT | Performed by: PHYSICIAN ASSISTANT

## 2018-12-05 PROCEDURE — 25000128 H RX IP 250 OP 636: Mod: ZF | Performed by: PHYSICIAN ASSISTANT

## 2018-12-05 PROCEDURE — 93005 ELECTROCARDIOGRAM TRACING: CPT

## 2018-12-05 PROCEDURE — 82378 CARCINOEMBRYONIC ANTIGEN: CPT | Performed by: PHYSICIAN ASSISTANT

## 2018-12-05 PROCEDURE — G0463 HOSPITAL OUTPT CLINIC VISIT: HCPCS | Mod: ZF

## 2018-12-05 PROCEDURE — 99215 OFFICE O/P EST HI 40 MIN: CPT | Mod: ZP | Performed by: PHYSICIAN ASSISTANT

## 2018-12-05 PROCEDURE — 93010 ELECTROCARDIOGRAM REPORT: CPT | Performed by: INTERNAL MEDICINE

## 2018-12-05 PROCEDURE — 85025 COMPLETE CBC W/AUTO DIFF WBC: CPT | Performed by: PHYSICIAN ASSISTANT

## 2018-12-05 RX ORDER — SODIUM CHLORIDE 9 MG/ML
1000 INJECTION, SOLUTION INTRAVENOUS CONTINUOUS PRN
Status: CANCELLED
Start: 2018-12-05

## 2018-12-05 RX ORDER — ACETAMINOPHEN 325 MG/1
650 TABLET ORAL
Status: CANCELLED | OUTPATIENT
Start: 2018-12-05

## 2018-12-05 RX ORDER — HEPARIN SODIUM (PORCINE) LOCK FLUSH IV SOLN 100 UNIT/ML 100 UNIT/ML
5 SOLUTION INTRAVENOUS EVERY 8 HOURS
Status: DISCONTINUED | OUTPATIENT
Start: 2018-12-05 | End: 2018-12-05 | Stop reason: HOSPADM

## 2018-12-05 RX ORDER — DIPHENHYDRAMINE HCL 25 MG
50 CAPSULE ORAL ONCE
Status: CANCELLED
Start: 2018-12-05

## 2018-12-05 RX ORDER — DIPHENHYDRAMINE HYDROCHLORIDE 50 MG/ML
50 INJECTION INTRAMUSCULAR; INTRAVENOUS
Status: CANCELLED
Start: 2018-12-05

## 2018-12-05 RX ORDER — ALBUTEROL SULFATE 0.83 MG/ML
2.5 SOLUTION RESPIRATORY (INHALATION)
Status: CANCELLED | OUTPATIENT
Start: 2018-12-05

## 2018-12-05 RX ORDER — EPINEPHRINE 1 MG/ML
0.3 INJECTION, SOLUTION INTRAMUSCULAR; SUBCUTANEOUS EVERY 5 MIN PRN
Status: CANCELLED | OUTPATIENT
Start: 2018-12-05

## 2018-12-05 RX ORDER — HEPARIN SODIUM (PORCINE) LOCK FLUSH IV SOLN 100 UNIT/ML 100 UNIT/ML
5 SOLUTION INTRAVENOUS EVERY 8 HOURS
Status: CANCELLED | OUTPATIENT
Start: 2018-12-05

## 2018-12-05 RX ORDER — LORAZEPAM 2 MG/ML
0.5 INJECTION INTRAMUSCULAR EVERY 4 HOURS PRN
Status: CANCELLED
Start: 2018-12-05

## 2018-12-05 RX ORDER — ALBUTEROL SULFATE 90 UG/1
1-2 AEROSOL, METERED RESPIRATORY (INHALATION)
Status: CANCELLED
Start: 2018-12-05

## 2018-12-05 RX ORDER — MEPERIDINE HYDROCHLORIDE 25 MG/ML
25 INJECTION INTRAMUSCULAR; INTRAVENOUS; SUBCUTANEOUS EVERY 30 MIN PRN
Status: CANCELLED | OUTPATIENT
Start: 2018-12-05

## 2018-12-05 RX ORDER — METHYLPREDNISOLONE SODIUM SUCCINATE 125 MG/2ML
125 INJECTION, POWDER, LYOPHILIZED, FOR SOLUTION INTRAMUSCULAR; INTRAVENOUS
Status: CANCELLED
Start: 2018-12-05

## 2018-12-05 RX ORDER — EPINEPHRINE 0.3 MG/.3ML
0.3 INJECTION SUBCUTANEOUS EVERY 5 MIN PRN
Status: CANCELLED | OUTPATIENT
Start: 2018-12-05

## 2018-12-05 RX ORDER — LANOLIN ALCOHOL/MO/W.PET/CERES
3 CREAM (GRAM) TOPICAL
Qty: 30 TABLET | Refills: 0 | Status: SHIPPED | OUTPATIENT
Start: 2018-12-05 | End: 2019-02-05

## 2018-12-05 RX ADMIN — SODIUM CHLORIDE, PRESERVATIVE FREE 5 ML: 5 INJECTION INTRAVENOUS at 13:40

## 2018-12-05 RX ADMIN — TRASTUZUMAB 450 MG: 150 INJECTION, POWDER, LYOPHILIZED, FOR SOLUTION INTRAVENOUS at 16:17

## 2018-12-05 RX ADMIN — SODIUM CHLORIDE, PRESERVATIVE FREE 5 ML: 5 INJECTION INTRAVENOUS at 16:51

## 2018-12-05 ASSESSMENT — PAIN SCALES - GENERAL: PAINLEVEL: NO PAIN (0)

## 2018-12-05 NOTE — LETTER
12/5/2018      RE: Hoda Brush  [address] [facility] [address]       Oncology/Hematology Visit Note  Dec 5, 2018    Reason for Visit: follow up of ER positive, HER2 positive left metastatic breast cancer    History of Present Illness: Hoda Brush is a 63 year old female with ER positive, HER2 positive left metastatic breast cancer with bone mets.     TREATMENT HISTORY:  A. Initial diagnosis with metastatic breast cancer in [facility].  Neoadjuvant CAF x 6.   B. Left mastectomy. Left axillary node dissection.  C.  Radiation in 1 dose to R iliac region. g Gy.  C. Herceptin for 2 years taxane for a prescribed course then stopped, monthly zoledronic acid.  She had 2 years of Herceptin with tamoxifen added after chemotherapy.  D.  Tamoxifen alone and zoledronic acid every 3 months.  E.  [place]  We restarted Herceptin every 3 weeks and continued tamoxifen. Bone targeted agent changed to denosumab every 6 weeks.      She is from [facility], formerly from [facility], [place]  She lives with her daughter and is here for continuation of every 3 week Herceptin, which she receives along with tamoxifen.  Her tumor is ER positive, MO positive, HER-2 positive.  She had metastatic disease at the time of presentation with bone-only metastases by report.  She presented with metastatic disease in 02/2014.  Staging was initially bone-only metastases by report.  She did her staging in 02/2014 that showed that she had stage IV disease, T4N2M1 invasive ductal carcinoma of the left breast.  She had a right hip metastasis.  She underwent neoadjuvant CAF, left mastectomy, left axillary lymph node dissection and radiation.  She had radiation of the right iliac region where she had metastatic disease.  Pathology report from [facility] shows the tumor to be positive for ER in 75% of the cells, positive for MO in 40% of the cells, and the HER-2 was 3+ positive by immunohistochemistry.  The Ki-67 was  20%.  She had no evidence of nonbone metastatic disease on her PET/CT scan from 08/2017.    Restaging on 10/1/18 was stable.       Interval History:  About 3 weeks ago she had episode of spasm on left side of chest while seated and sewing. She felt she could not take a breath as there was a sharp pain that radiated to her back. Pain lasted about 10 minutes. She took a medicine from [place] called Infusionsoft that relieved her pain. She stated this happened once before about a year ago. She denies any chest pain with exertion. Denies any dizziness, dyspnea, pleuritic pain. No orthopnea, leg swelling.     She is going to dentist tomorrow as her bridge broke and she lost a few crowns. Denies any tooth or jaw pain. She is able to eat. Otherwise feeling well. Insomnia improved with melatonin and no longer taking mirtazipine.    Review of Systems:  Patient denies any of the following except if noted above: fevers, chills, abdominal pain, nausea, vomiting, diarrhea, constipation, urinary concerns, headaches, new aches/pains.    Current Outpatient Prescriptions   Medication Sig Dispense Refill     melatonin 3 MG tablet Take 1 tablet (3 mg) by mouth nightly as needed for sleep 30 tablet 0     order for DME Equipment being ordered: 2 Mastectomy bras and 1 prosthetheses. (Patient not taking: Reported on 11/13/2018) 2 Piece 0     order for DME L UE class 2 compression sleeve and gauntlet  Night garment  Bandaging supplies (Patient not taking: Reported on 11/13/2018) 1 each 1     tamoxifen (NOLVADEX) 20 MG tablet Take 1 tablet (20 mg) by mouth daily 90 tablet 3     VITAMIN D, CHOLECALCIFEROL, PO Take 1,000 Units by mouth daily         Physical Examination:  General: The patient is a pleasant female in no acute distress.  /74 (BP Location: Right arm, Patient Position: Sitting, Cuff Size: Adult Regular)  Pulse 77  Temp 98.4  F (36.9  C) (Oral)  Resp 18  Wt 81.8 kg (180 lb 4.8 oz)  SpO2 95%  BMI 32.97 kg/m2  Wt Readings from  Last 10 Encounters:   18 81.8 kg (180 lb 4.8 oz)   18 82.5 kg (181 lb 12.8 oz)   10/23/18 83 kg (182 lb 14.4 oz)   10/02/18 83.6 kg (184 lb 3.2 oz)   18 82.7 kg (182 lb 6.4 oz)   18 82.8 kg (182 lb 9.6 oz)   18 82.3 kg (181 lb 8 oz)   07/10/18 81.2 kg (179 lb)   18 80.9 kg (178 lb 6.4 oz)   18 79.8 kg (176 lb)     HEENT: EOMI, PERRL. Sclerae are anicteric. Oral mucosa is pink and moist with no lesions or thrush.   Lymph: Neck is supple with no lymphadenopathy in the cervical or supraclavicular areas.   Heart: Regular rate and rhythm.   Lungs: Clear to auscultation bilaterally.   Abdomen: Bowel sounds present, soft, nontender with no palpable hepatosplenomegaly or masses.   Extremities: No lower extremity edema noted bilaterally.   Neuro: Cranial nerves II through XII are grossly intact.  Skin: No rashes, petechiae, or bruising noted on exposed skin.    Laboratory Data:  Results for orders placed or performed in visit on 18 (from the past 24 hour(s))   Echocardiogram Complete    Narrative    [id number]  [id number]  [id number]  [id number]^MOLINA^ASHWIN^TANYA           [facility]  [facility]  [address] [place] [id number]     Name: NATASHA LOUIS  MRN: [id number]  : 1955  Study Date: 2018 01:14 PM  Age: 63 yrs  Gender: Female  Patient Location: [facility]  Reason For Study: , Malignant neoplasm of left female breast, unspecified  estrogen  Ordering Physician: ASHWIN AUGUSTE  Referring Physician: ASHWIN AUGUSTE  Performed By: Shaneka Mistry RDCS     BSA: 1.8 m2  Height: 62 in  Weight: 181 lb  HR: 83  BP: 127/70 mmHg  _____________________________________________________________________________  __        Procedure  Echocardiogram with two-dimensional, color and spectral Doppler performed.  _____________________________________________________________________________  __         Interpretation Summary  Normal left ventricular size and systolic function. LVEF 62% based on  volumetric 3D analysis.  Global peak LV longitudinal strain is averaged at -20%. This is within  reported normal limits (normal <-18%).  Normal right ventricular size and systolic function.  No valve disease.  _____________________________________________________________________________  __        Left Ventricle  Global and regional left ventricular function is normal with an EF of 60-65%.  Left ventricular size is normal. Left ventricular wall thickness is normal.  LVEF 62% based on volumetric 3D analysis. Global peak LV longitudinal strain  is averaged at -20%. This is within reported normal limits (normal <-18%).  Left ventricular diastolic function is normal. No regional wall motion  abnormalities are seen.     Right Ventricle  The right ventricle is normal size. Global right ventricular function is  normal.     Atria  Both atria appear normal.     Mitral Valve  The mitral valve is normal. Trace mitral insufficiency is present.        Aortic Valve  Aortic valve is normal in structure and function. The aortic valve is  tricuspid.     Tricuspid Valve  The tricuspid valve is normal. Trace tricuspid insufficiency is present. The  peak velocity of the tricuspid regurgitant jet is not obtainable. Pulmonary  artery systolic pressure cannot be assessed.     Pulmonic Valve  The pulmonic valve is normal.     Vessels  The inferior vena cava was normal in size with preserved respiratory  variability. The aorta root is normal.     Pericardium  No pericardial effusion is present.     _____________________________________________________________________________  __  MMode/2D Measurements & Calculations  IVSd: 0.86 cm  LVIDd: 4.5 cm  LVIDs: 3.1 cm  LVPWd: 0.84 cm  FS: 31.3 %  LV mass(C)d: 125.0 grams  LV mass(C)dI: 68.2 grams/m2     LA Volume (BP): 44.3 ml  LA Volume Index (BP): 24.2 ml/m2  RWT: 0.37        Doppler Measurements &  Calculations  MV E max blayne: 74.2 cm/sec  MV A max blayne: 68.4 cm/sec  MV E/A: 1.1  MV dec slope: 430.1 cm/sec2  MV dec time: 0.17 sec  E/E' av.8  Lateral E/e': 6.7  Medial E/e': 12.8        QLAB 2DQ/CMQ  10_EDV(AP2)(aCMQ): 48.4 ml  10_ESV(AP2)(aCMQ): 20.5 ml  10_EDV(AP4)(aCMQ): 69.0 ml  10_ESV(AP4)(aCMQ): 28.9 ml  10_EDV(Bi-Plane)(aCMQ): 58.0 ml  10_EF(Bi-Plane)(aCMQ): 57.7 %  10_EF(AP2)(aCMQ): 57.7 %  10_EF(AP4)(aCMQ): 58.1 %  10_ESV(Bi-Plane)(aCMQ): 24.5 ml     _____________________________________________________________________________  __        Report approved by: Ale Maki 2018 02:02 PM          Assessment and Plan:    1. Metastatic breast cancer, ER, SC, HER2+ positive: Was last treated in [facility] with Herceptin. We have continued Herceptin every 3 weeks as well as daily tamoxifen. CT CAP on 10/118 with stable disease. Echocardiogram today with EF 62% and normal LV function. Spoke with Dr. Holland about patient's atypical chest pain. She felt it was ok to proceed with Herceptin today given echo and EKG results, but to have her evaluated by cardiology if chest pain should recur. Will proceed with Herceptin and place cardiology referral. Continue Tamoxifen.   --Scheduled for next cycle on . Follow up with Dr. Aguiar on 1/15 for Herceptin with CT CAP on     2. Bone metastasis: On xgeva every 6 weeks. On calcium + vitamin D. Xgeva due today. Calcium corrects to WNL. Last dose on . As she has dental issues, will need to hold until evaluated by dentist--visit scheduled this week.    3. Atypical chest pain: occurred while seated and sewing. Pain in left side of chest that was sharp and radiated to back. Resolved after taking an medication called Validol from [place]. Spoke with pharmacy who were unable to find any information about this medication or mechanism of action. EKG today with NSR. As episode was 3 weeks ago, did not order biomarkers. Denies chest pain with exertion. Spoke  with Dr. Holland as above. Will place cardiology referral as may need stress test and further evaluation. Hoda knows to call if chest pain recurs and go to ED for further evaluation.      3. LUE lymphedema: She completed lymphedema treatment and has a sleeve to use now.      4. Insomnia: She feels melatonin is working. No longer taking remeron as was too sedating. Refill for melatonin 3mg at bedtime prn sent.     5. Health maintenance: Flu shot will be given today    Jossie Sparrow PA-C  [facility]  [address], [place] [id number] [phone]

## 2018-12-05 NOTE — NURSING NOTE
"Oncology Rooming Note    December 5, 2018 2:28 PM   Hoda Brush is a 63 year old female who presents for:    Chief Complaint   Patient presents with     Oncology Clinic Visit     Breast Cancer     Initial Vitals: /74 (BP Location: Right arm, Patient Position: Sitting, Cuff Size: Adult Regular)  Pulse 77  Temp 98.4  F (36.9  C) (Oral)  Resp 18  Ht 1.575 m (5' 2\")  Wt 81.8 kg (180 lb 4.8 oz)  SpO2 95%  BMI 32.98 kg/m2 Estimated body mass index is 32.98 kg/(m^2) as calculated from the following:    Height as of this encounter: 1.575 m (5' 2\").    Weight as of this encounter: 81.8 kg (180 lb 4.8 oz). Body surface area is 1.89 meters squared.  No Pain (0) Comment: Data Unavailable   No LMP recorded. Patient is postmenopausal.  Allergies reviewed: Yes  Medications reviewed: Yes    Medications: Medication refills not needed today.  Pharmacy name entered into [employer]:    [id number] [place] [facility] - [place], [place] - [address] [id number] - [place], [place] - [address] [facility] [place] [place] [address] [place] [address] DRUG STORE [id number] - [place], [place] - [address] AT  [address] [facility] [address] [facility]    Clinical concerns: No New Concerns    5 minutes for nursing intake (face to face time)     JOHAN Das      "

## 2018-12-05 NOTE — PROGRESS NOTES
Oncology/Hematology Visit Note  Dec 5, 2018    Reason for Visit: follow up of ER positive, HER2 positive left metastatic breast cancer    History of Present Illness: Hoda Brush is a 63 year old female with ER positive, HER2 positive left metastatic breast cancer with bone mets.     TREATMENT HISTORY:  A. Initial diagnosis with metastatic breast cancer in [facility].  Neoadjuvant CAF x 6.   B. Left mastectomy. Left axillary node dissection.  C.  Radiation in 1 dose to R iliac region. g Gy.  C. Herceptin for 2 years taxane for a prescribed course then stopped, monthly zoledronic acid.  She had 2 years of Herceptin with tamoxifen added after chemotherapy.  D.  Tamoxifen alone and zoledronic acid every 3 months.  E.  [place]  We restarted Herceptin every 3 weeks and continued tamoxifen. Bone targeted agent changed to denosumab every 6 weeks.      She is from [facility], formerly from [facility], [place]  She lives with her daughter and is here for continuation of every 3 week Herceptin, which she receives along with tamoxifen.  Her tumor is ER positive, TX positive, HER-2 positive.  She had metastatic disease at the time of presentation with bone-only metastases by report.  She presented with metastatic disease in 02/2014.  Staging was initially bone-only metastases by report.  She did her staging in 02/2014 that showed that she had stage IV disease, T4N2M1 invasive ductal carcinoma of the left breast.  She had a right hip metastasis.  She underwent neoadjuvant CAF, left mastectomy, left axillary lymph node dissection and radiation.  She had radiation of the right iliac region where she had metastatic disease.  Pathology report from [facility] shows the tumor to be positive for ER in 75% of the cells, positive for TX in 40% of the cells, and the HER-2 was 3+ positive by immunohistochemistry.  The Ki-67 was 20%.  She had no evidence of nonbone metastatic disease on her PET/CT scan from  08/2017.    Restaging on 10/1/18 was stable.       Interval History:  About 3 weeks ago she had episode of spasm on left side of chest while seated and sewing. She felt she could not take a breath as there was a sharp pain that radiated to her back. Pain lasted about 10 minutes. She took a medicine from [place] called validol that relieved her pain. She stated this happened once before about a year ago. She denies any chest pain with exertion. Denies any dizziness, dyspnea, pleuritic pain. No orthopnea, leg swelling.     She is going to dentist tomorrow as her bridge broke and she lost a few crowns. Denies any tooth or jaw pain. She is able to eat. Otherwise feeling well. Insomnia improved with melatonin and no longer taking mirtazipine.    Review of Systems:  Patient denies any of the following except if noted above: fevers, chills, abdominal pain, nausea, vomiting, diarrhea, constipation, urinary concerns, headaches, new aches/pains.    Current Outpatient Prescriptions   Medication Sig Dispense Refill     melatonin 3 MG tablet Take 1 tablet (3 mg) by mouth nightly as needed for sleep 30 tablet 0     order for DME Equipment being ordered: 2 Mastectomy bras and 1 prosthetheses. (Patient not taking: Reported on 11/13/2018) 2 Piece 0     order for DME L UE class 2 compression sleeve and gauntlet  Night garment  Bandaging supplies (Patient not taking: Reported on 11/13/2018) 1 each 1     tamoxifen (NOLVADEX) 20 MG tablet Take 1 tablet (20 mg) by mouth daily 90 tablet 3     VITAMIN D, CHOLECALCIFEROL, PO Take 1,000 Units by mouth daily         Physical Examination:  General: The patient is a pleasant female in no acute distress.  /74 (BP Location: Right arm, Patient Position: Sitting, Cuff Size: Adult Regular)  Pulse 77  Temp 98.4  F (36.9  C) (Oral)  Resp 18  Wt 81.8 kg (180 lb 4.8 oz)  SpO2 95%  BMI 32.97 kg/m2  Wt Readings from Last 10 Encounters:   12/05/18 81.8 kg (180 lb 4.8 oz)   11/13/18 82.5 kg (181  lb 12.8 oz)   10/23/18 83 kg (182 lb 14.4 oz)   10/02/18 83.6 kg (184 lb 3.2 oz)   18 82.7 kg (182 lb 6.4 oz)   18 82.8 kg (182 lb 9.6 oz)   18 82.3 kg (181 lb 8 oz)   07/10/18 81.2 kg (179 lb)   18 80.9 kg (178 lb 6.4 oz)   18 79.8 kg (176 lb)     HEENT: EOMI, PERRL. Sclerae are anicteric. Oral mucosa is pink and moist with no lesions or thrush.   Lymph: Neck is supple with no lymphadenopathy in the cervical or supraclavicular areas.   Heart: Regular rate and rhythm.   Lungs: Clear to auscultation bilaterally.   Abdomen: Bowel sounds present, soft, nontender with no palpable hepatosplenomegaly or masses.   Extremities: No lower extremity edema noted bilaterally.   Neuro: Cranial nerves II through XII are grossly intact.  Skin: No rashes, petechiae, or bruising noted on exposed skin.    Laboratory Data:  Results for orders placed or performed in visit on 18 (from the past 24 hour(s))   Echocardiogram Complete    Narrative    [id number]  [facility]19  [id number]  [id number]^MOLINA^ASHWIN^TANYA           [facility]  Diagnostic and Treamtent-3rd Floor  [address] [place] [id number]     Name: NATASHA LOUIS  MRN: [id number]  : 1955  Study Date: 2018 01:14 PM  Age: 63 yrs  Gender: Female  Patient Location: [facility]  Reason For Study: , Malignant neoplasm of left female breast, unspecified  estrogen  Ordering Physician: ASHWIN AUGUSTE  Referring Physician: ASHWIN AUGUSTE  Performed By: Shaneka Mistry RDCS     BSA: 1.8 m2  Height: 62 in  Weight: 181 lb  HR: 83  BP: 127/70 mmHg  _____________________________________________________________________________  __        Procedure  Echocardiogram with two-dimensional, color and spectral Doppler performed.  _____________________________________________________________________________  __        Interpretation Summary  Normal left ventricular size and systolic  function. LVEF 62% based on  volumetric 3D analysis.  Global peak LV longitudinal strain is averaged at -20%. This is within  reported normal limits (normal <-18%).  Normal right ventricular size and systolic function.  No valve disease.  _____________________________________________________________________________  __        Left Ventricle  Global and regional left ventricular function is normal with an EF of 60-65%.  Left ventricular size is normal. Left ventricular wall thickness is normal.  LVEF 62% based on volumetric 3D analysis. Global peak LV longitudinal strain  is averaged at -20%. This is within reported normal limits (normal <-18%).  Left ventricular diastolic function is normal. No regional wall motion  abnormalities are seen.     Right Ventricle  The right ventricle is normal size. Global right ventricular function is  normal.     Atria  Both atria appear normal.     Mitral Valve  The mitral valve is normal. Trace mitral insufficiency is present.        Aortic Valve  Aortic valve is normal in structure and function. The aortic valve is  tricuspid.     Tricuspid Valve  The tricuspid valve is normal. Trace tricuspid insufficiency is present. The  peak velocity of the tricuspid regurgitant jet is not obtainable. Pulmonary  artery systolic pressure cannot be assessed.     Pulmonic Valve  The pulmonic valve is normal.     Vessels  The inferior vena cava was normal in size with preserved respiratory  variability. The aorta root is normal.     Pericardium  No pericardial effusion is present.     _____________________________________________________________________________  __  MMode/2D Measurements & Calculations  IVSd: 0.86 cm  LVIDd: 4.5 cm  LVIDs: 3.1 cm  LVPWd: 0.84 cm  FS: 31.3 %  LV mass(C)d: 125.0 grams  LV mass(C)dI: 68.2 grams/m2     LA Volume (BP): 44.3 ml  LA Volume Index (BP): 24.2 ml/m2  RWT: 0.37        Doppler Measurements & Calculations  MV E max blayne: 74.2 cm/sec  MV A max blayne: 68.4 cm/sec  MV  E/A: 1.1  MV dec slope: 430.1 cm/sec2  MV dec time: 0.17 sec  E/E' av.8  Lateral E/e': 6.7  Medial E/e': 12.8        QLAB 2DQ/CMQ  10_EDV(AP2)(aCMQ): 48.4 ml  10_ESV(AP2)(aCMQ): 20.5 ml  10_EDV(AP4)(aCMQ): 69.0 ml  10_ESV(AP4)(aCMQ): 28.9 ml  10_EDV(Bi-Plane)(aCMQ): 58.0 ml  10_EF(Bi-Plane)(aCMQ): 57.7 %  10_EF(AP2)(aCMQ): 57.7 %  10_EF(AP4)(aCMQ): 58.1 %  10_ESV(Bi-Plane)(aCMQ): 24.5 ml     _____________________________________________________________________________  __        Report approved by: Ale Maki 2018 02:02 PM          Assessment and Plan:    1. Metastatic breast cancer, ER, LA, HER2+ positive: Was last treated in [facility] with Herceptin. We have continued Herceptin every 3 weeks as well as daily tamoxifen. CT CAP on 10/118 with stable disease. Echocardiogram today with EF 62% and normal LV function. Spoke with Dr. Holland about patient's atypical chest pain. She felt it was ok to proceed with Herceptin today given echo and EKG results, but to have her evaluated by cardiology if chest pain should recur. Will proceed with Herceptin and place cardiology referral. Continue Tamoxifen.   --Scheduled for next cycle on . Follow up with Dr. Aguiar on 1/15 for Herceptin with CT CAP on     2. Bone metastasis: On xgeva every 6 weeks. On calcium + vitamin D. Xgeva due today. Calcium corrects to WNL. Last dose on . As she has dental issues, will need to hold until evaluated by dentist--visit scheduled this week.    3. Atypical chest pain: occurred while seated and sewing. Pain in left side of chest that was sharp and radiated to back. Resolved after taking an medication called Validol from [place]. Spoke with pharmacy who were unable to find any information about this medication or mechanism of action. EKG today with NSR. As episode was 3 weeks ago, did not order biomarkers. Denies chest pain with exertion. Spoke with Dr. Holland as above. Will place cardiology referral as may need  stress test and further evaluation. Hoda knows to call if chest pain recurs and go to ED for further evaluation.      3. LUE lymphedema: She completed lymphedema treatment and has a sleeve to use now.      4. Insomnia: She feels melatonin is working. No longer taking remeron as was too sedating. Refill for melatonin 3mg at bedtime prn sent.     5. Health maintenance: Flu shot will be given today    Jossie Sparrow PA-C  [facility]  [address], [place] [address] [phone]

## 2018-12-05 NOTE — PATIENT INSTRUCTIONS
Contact Numbers    [facility] [place]: [phone]  [facility] Triage:  [phone]    Call triage with chills and/or temperature greater than or equal to 100.5, uncontrolled nausea/vomiting, diarrhea, constipation, dizziness, shortness of breath, chest pain, bleeding, unexplained bruising, or any new/concerning symptoms, questions/concerns.     If you are having any concerning symptoms or wish to speak to a provider before your next infusion visit, please call your care coordinator or triage to notify them so we can adequately serve you.       After Hours: [phone]    If after hours, weekends, or holidays, call main hospital  and ask for Oncology doctor on call.         December 2018 Sunday Monday Tuesday Wednesday Thursday Friday Saturday                                 1       2     3     4     5     ECH COMPLETE    1:00 PM   (90 min.)   [id number]   [facility] Echo     [facility] DRAW    1:30 PM   (15 min.)   [facility] DRAW   [facility] Lab Draw     LAB WITH  CLINIC    2:15 PM   (30 min.)    LAB   [facility] Lab     [facility] RETURN    2:35 PM   (90 min.)   Jossie Sparrow PA   [facility] Cancer [facility]     [facility] ONC INFUSION 60    3:45 PM   (75 min.)    ONCOLOGY INFUSION   [facility] 6     7     8       9     10     11     12     13     14     CT CHEST/ABDOMEN/PELVIS W    2:00 PM   (20 min.)   UCCT1   [facility] CT 15       16     17     18     19     20     21     22       23     24     [facility] DRAW    9:00 AM   (15 min.)    [facility]RAW   [facility]     [facility] ONC INFUSION 60    9:30 AM   (60 min.)    ONCOLOGY INFUSION   [facility] 25     26     27     28     29       30     31                                         January 2019 Sunday Monday Tuesday Wednesday Thursday Friday Saturday             1     2     3     4     5       6     7     8     9     10     11     12       13     14     15     [facility]  LAB DRAW    2:30 PM   (15 min.)   [facility] LAB DRAW   [facility] Lab Draw     [facility] RETURN    2:45 PM   (30 min.)   Lisandro Aguiar MD   [facility] ONC INFUSION 60    4:00 PM   (60 min.)    ONCOLOGY INFUSION   [facility] 16     17     18     19       20     21     22     23     24     25     26       27     28     29     30     31                            Lab Results:  Recent Results (from the past 12 hour(s))   CBC with platelets differential    Collection Time: 12/05/18  1:48 PM   Result Value Ref Range    WBC 6.3 4.0 - 11.0 10e9/L    RBC Count 3.83 3.8 - 5.2 10e12/L    Hemoglobin 11.3 (L) 11.7 - 15.7 g/dL    Hematocrit 36.5 35.0 - 47.0 %    MCV 95 78 - 100 fl    MCH 29.5 26.5 - 33.0 pg    MCHC 31.0 (L) 31.5 - 36.5 g/dL    RDW 13.1 10.0 - 15.0 %    Platelet Count 193 150 - 450 10e9/L    Diff Method Automated Method     % Neutrophils 54.1 %    % Lymphocytes 35.5 %    % Monocytes 6.2 %    % Eosinophils 3.4 %    % Basophils 0.5 %    % Immature Granulocytes 0.3 %    Nucleated RBCs 0 0 /100    Absolute Neutrophil 3.4 1.6 - 8.3 10e9/L    Absolute Lymphocytes 2.2 0.8 - 5.3 10e9/L    Absolute Monocytes 0.4 0.0 - 1.3 10e9/L    Absolute Eosinophils 0.2 0.0 - 0.7 10e9/L    Absolute Basophils 0.0 0.0 - 0.2 10e9/L    Abs Immature Granulocytes 0.0 0 - 0.4 10e9/L    Absolute Nucleated RBC 0.0    Comprehensive metabolic panel    Collection Time: 12/05/18  1:48 PM   Result Value Ref Range    Sodium 143 133 - 144 mmol/L    Potassium 4.0 3.4 - 5.3 mmol/L    Chloride 108 94 - 109 mmol/L    Carbon Dioxide 28 20 - 32 mmol/L    Anion Gap 8 3 - 14 mmol/L    Glucose 114 (H) 70 - 99 mg/dL    Urea Nitrogen 16 7 - 30 mg/dL    Creatinine 0.85 0.52 - 1.04 mg/dL    GFR Estimate 67 >60 mL/min/1.7m2    GFR Estimate If Black 81 >60 mL/min/1.7m2    Calcium 8.5 8.5 - 10.1 mg/dL    Bilirubin Total 0.1 (L) 0.2 - 1.3 mg/dL    Albumin 3.1 (L) 3.4 - 5.0 g/dL    Protein Total 7.3 6.8 - 8.8  g/dL    Alkaline Phosphatase 39 (L) 40 - 150 U/L    ALT 29 0 - 50 U/L    AST 26 0 - 45 U/L   CA 27.29 Breast tumor marker    Collection Time: 12/05/18  1:48 PM   Result Value Ref Range    CA 27-29 8 0 - 39 U/mL   CEA    Collection Time: 12/05/18  1:48 PM   Result Value Ref Range    CEA 0.7 0 - 2.5 ug/L   EKG 12-lead complete w/read - Clinics    Collection Time: 12/05/18  4:14 PM   Result Value Ref Range    Interpretation ECG Click View Image link to view waveform and result

## 2018-12-05 NOTE — MR AVS SNAPSHOT
After Visit Summary   12/5/2018    Hoda Brush    MRN: [id number]           Patient Information     Date Of Birth          1955        Visit Information        Provider Department      12/5/2018 3:45 PM Wesley Iraheta;  12 ATC;  ONCOLOGY INFUSION  Services Department        Today's Diagnoses     Malignant neoplasm of left female breast, unspecified estrogen receptor status, unspecified site of breast (H)    -  1      Care Instructions    Contact Numbers    [facility] [place]: [phone]  [facility] Triage:  [phone]    Call triage with chills and/or temperature greater than or equal to 100.5, uncontrolled nausea/vomiting, diarrhea, constipation, dizziness, shortness of breath, chest pain, bleeding, unexplained bruising, or any new/concerning symptoms, questions/concerns.     If you are having any concerning symptoms or wish to speak to a provider before your next infusion visit, please call your care coordinator or triage to notify them so we can adequately serve you.       After Hours: [phone]    If after hours, weekends, or holidays, call main hospital  and ask for Oncology doctor on call.         December 2018 Sunday Monday Tuesday Wednesday Thursday Friday Saturday                                 1       2     3     4     5     ECH COMPLETE    1:00 PM   (90 min.)   ECHCR2   [facility] Echo     [facility] LAB DRAW    1:30 PM   (15 min.)   [facility] LAB DRAW   [facility] Lab Draw     LAB WITH  CLINIC    2:15 PM   (30 min.)    LAB   [facility] Lab     [facility] RETURN    2:35 PM   (90 min.)   Jossie Sparrow, PA   [facility] Cancer [facility] ONC INFUSION 60    3:45 PM   (75 min.)    ONCOLOGY INFUSION   [facility] Cancer [facility] 6     7     8       9     10     11     12     13     14     CT CHEST/ABDOMEN/PELVIS W    2:00 PM   (20 min.)   UCCT1   [facility] CT 15       16     17     18     19     20     21     22       23      24     [facility] LAB DRAW    9:00 AM   (15 min.)    [facility] LAB DRAW   [facility] Lab Draw     [facility] ONC INFUSION 60    9:30 AM   (60 min.)    ONCOLOGY INFUSION   [facility] 25     26     27     28     29       30     31                                         January 2019 Sunday Monday Tuesday Wednesday Thursday Friday Saturday             1     2     3     4     5       6     7     8     9     10     11     12       13     14     15     [facility] LAB DRAW    2:30 PM   (15 min.)    [facility] LAB DRAW   [facility] Lab Draw     [facility] RETURN    2:45 PM   (30 min.)   Lisandro Aguiar MD   [facility] 60    4:00 PM   (60 min.)    ONCOLOGY INFUSION   [facility] 16     17     18     19       20     21     22     23     24     25     26       27     28     29     30     31                            Lab Results:  Recent Results (from the past 12 hour(s))   CBC with platelets differential    Collection Time: 12/05/18  1:48 PM   Result Value Ref Range    WBC 6.3 4.0 - 11.0 10e9/L    RBC Count 3.83 3.8 - 5.2 10e12/L    Hemoglobin 11.3 (L) 11.7 - 15.7 g/dL    Hematocrit 36.5 35.0 - 47.0 %    MCV 95 78 - 100 fl    MCH 29.5 26.5 - 33.0 pg    MCHC 31.0 (L) 31.5 - 36.5 g/dL    RDW 13.1 10.0 - 15.0 %    Platelet Count 193 150 - 450 10e9/L    Diff Method Automated Method     % Neutrophils 54.1 %    % Lymphocytes 35.5 %    % Monocytes 6.2 %    % Eosinophils 3.4 %    % Basophils 0.5 %    % Immature Granulocytes 0.3 %    Nucleated RBCs 0 0 /100    Absolute Neutrophil 3.4 1.6 - 8.3 10e9/L    Absolute Lymphocytes 2.2 0.8 - 5.3 10e9/L    Absolute Monocytes 0.4 0.0 - 1.3 10e9/L    Absolute Eosinophils 0.2 0.0 - 0.7 10e9/L    Absolute Basophils 0.0 0.0 - 0.2 10e9/L    Abs Immature Granulocytes 0.0 0 - 0.4 10e9/L    Absolute Nucleated RBC 0.0    Comprehensive metabolic panel    Collection Time: 12/05/18  1:48 PM   Result Value Ref  Range    Sodium 143 133 - 144 mmol/L    Potassium 4.0 3.4 - 5.3 mmol/L    Chloride 108 94 - 109 mmol/L    Carbon Dioxide 28 20 - 32 mmol/L    Anion Gap 8 3 - 14 mmol/L    Glucose 114 (H) 70 - 99 mg/dL    Urea Nitrogen 16 7 - 30 mg/dL    Creatinine 0.85 0.52 - 1.04 mg/dL    GFR Estimate 67 >60 mL/min/1.7m2    GFR Estimate If Black 81 >60 mL/min/1.7m2    Calcium 8.5 8.5 - 10.1 mg/dL    Bilirubin Total 0.1 (L) 0.2 - 1.3 mg/dL    Albumin 3.1 (L) 3.4 - 5.0 g/dL    Protein Total 7.3 6.8 - 8.8 g/dL    Alkaline Phosphatase 39 (L) 40 - 150 U/L    ALT 29 0 - 50 U/L    AST 26 0 - 45 U/L   CA 27.29 Breast tumor marker    Collection Time: 12/05/18  1:48 PM   Result Value Ref Range    CA 27-29 8 0 - 39 U/mL   CEA    Collection Time: 12/05/18  1:48 PM   Result Value Ref Range    CEA 0.7 0 - 2.5 ug/L   EKG 12-lead complete w/read - Clinics    Collection Time: 12/05/18  4:14 PM   Result Value Ref Range    Interpretation ECG Click View Image link to view waveform and result                Follow-ups after your visit        Your next 10 appointments already scheduled     Dec 14, 2018  2:00 PM CST   CT CHEST/ABDOMEN/PELVIS W CONTRAST with UCCT1   [facility] [place] [facility] ([facility])    [address] [address] [phone]           How do I prepare for my exam? (Food and drink instructions) To prepare: Do not eat or drink for 2 hours before your exam. If you need to take medicine, you may take it with small sips of water. (We may ask you to take liquid medicine as well.)  How do I prepare for my exam? (Other instructions) Please arrive 30 minutes early for your CT.  Once in the department you might be asked to drink water 15-20 minutes prior to your exam.  If indicated you may be asked to drink an oral contrast in advance of your CT.  If this is the case, the imaging team will let you know or be in contact with you prior to your appointment  Patients over 70 or patients  with diabetes or kidney problems: If you haven t had a blood test (creatinine test) within the last 30 days, the Cardiologist/Radiologist may require you to get this test prior to your exam.  If you have diabetes:  Continue to take your metformin medication on the day of your exam  What should I wear: Please wear loose clothing, such as a sweat suit or jogging clothes. Avoid snaps, zippers and other metal. We may ask you to undress and put on a hospital gown.  How long does the exam take: Most scans take less than 20 minutes.  What should I bring: Please bring any scans or X-rays taken at other hospitals, if similar tests were done. Also bring a list of your medicines, including vitamins, minerals and over-the-counter drugs. It is safest to leave personal items at home.  Do I need a : No  is needed.  What do I need to tell my doctor? Be sure to tell your doctor: * If you have any allergies. * If there s any chance you are pregnant. * If you are breastfeeding.  What should I do after the exam: No restrictions, You may resume normal activities.  What is this test: A CT (computed tomography) scan is a series of pictures that allows us to look inside your body. The scanner creates images of the body in cross sections, much like slices of bread. This helps us see any problems more clearly. You may receive contrast (X-ray dye) before or during your scan. You will be asked to drink the contrast.  Who should I call with questions: If you have any questions, please call the Imaging Department where you will have your exam. Directions, parking instructions, and other information is available on our website, [email or web address].            Dec 24, 2018  9:00 AM CST   [facility] Lab Draw with  [facility] LAB DRAW   [facility] Draw ([facility] [place])    [address]  [address]  [facility] [address] [phone]            Dec 24, 2018  9:30 AM CST   Infusion 60 with   ONCOLOGY INFUSION, UC 30 ATC   [facility] ([facility])    [address]  [address]  [facility] [address]   [phone]            Rex 15, 2019  2:30 PM CST   [facility] Lab Draw with  M[facility]IC LAB DRAW   [facility] Lab Draw ([facility])    [address]  [address]  [facility] [address]   [phone]            Rex 15, 2019  3:00 PM CST   (Arrive by 2:45 PM)   Return Visit with Lisandro Aguiar MD   [facility] ([facility])    [address] [place] [address]  [address]  [facility] [address]   [phone]            Rex 15, 2019  4:00 PM CST   Infusion 60 with  ONCOLOGY INFUSION, UC 12 ATC   [facility] ([facility])    [address]  [address]  [facility] [address] [phone]              Who to contact     If you have questions or need follow up information about today's clinic visit or your schedule please contact [facility] directly at [phone].  Normal or non-critical lab and imaging results will be communicated to you by MyChart, letter or phone within 4 business days after the clinic has received the results. If you do not hear from us within 7 days, please contact the clinic through MyChart or phone. If you have a critical or abnormal lab result, we will notify you by phone as soon as possible.  Submit refill requests through [employer] or call your pharmacy and they will forward the refill request to us. Please allow 3 business days for your refill to be completed.          Additional Information About Your Visit        vufindhart Information     [employer] gives you secure access to your electronic health record. If you see a primary care provider, you can also send messages to your care team and make appointments. If you have questions, please call your primary care clinic.  If you do not  have a primary care provider, please call [phone] and they will assist you.        Care EveryWhere ID     This is your Care EveryWhere ID. This could be used by other organizations to access your [place] medical records  [id number]         Blood Pressure from Last 3 Encounters:   12/05/18 111/74   11/13/18 135/67   10/23/18 121/54    Weight from Last 3 Encounters:   12/05/18 81.8 kg (180 lb 4.8 oz)   11/13/18 82.5 kg (181 lb 12.8 oz)   10/23/18 83 kg (182 lb 14.4 oz)              We Performed the Following     CA 27.29 Breast tumor marker     CBC with platelets differential     CEA     Comprehensive metabolic panel          Where to get your medicines      These medications were sent to [facility], [place] - [address]  [address] [place] [address]     Phone:  [phone]     melatonin 3 MG tablet          Primary Care Provider Fax #    Physician No Ref-Primary [phone]       No address on file        Equal Access to Services     TONEY VAZQUEZ : Hadarturo Ramsey, wamikki jefferson, qaalexa ang. So [facility] [phone].    ATENCIÓN: Si habla español, tiene a lagunas disposición servicios gratuitos de asistencia lingüística. Luis al [phone].    We comply with applicable federal civil rights laws and [place] laws. We do not discriminate on the basis of race, color, national origin, age, disability, sex, sexual orientation, or gender identity.            Thank you!     Thank you for choosing [facility]  for your care. Our goal is always to provide you with excellent care. Hearing back from our patients is one way we can continue to improve our services. Please take a few minutes to complete the written survey that you may receive in the mail after your visit with us. Thank you!             Your Updated Medication List - Protect others around you: Learn  how to safely use, store and throw away your medicines at www.disposemymeds.org.          This list is accurate as of 12/5/18  4:52 PM.  Always use your most recent med list.                   Brand Name Dispense Instructions for use Diagnosis    melatonin 3 MG tablet     30 tablet    Take 1 tablet (3 mg) by mouth nightly as needed for sleep    Primary insomnia       order for DME     1 each    L UE class 2 compression sleeve and gauntlet Night garment Bandaging supplies    Lymphedema of left upper extremity       order for DME     2 Piece    Equipment being ordered: 2 Mastectomy bras and 1 prosthetheses.    Malignant neoplasm of left female breast, unspecified estrogen receptor status, unspecified site of breast (H)       tamoxifen 20 MG tablet    NOLVADEX    90 tablet    Take 1 tablet (20 mg) by mouth daily    Cancer of central portion of left breast (H)       VITAMIN D (CHOLECALCIFEROL) PO      Take 1,000 Units by mouth daily

## 2018-12-05 NOTE — MR AVS SNAPSHOT
After Visit Summary   12/5/2018    Hoda Brush    MRN: [id number]           Patient Information     Date Of Birth          1955        Visit Information        Provider Department      12/5/2018 2:35 PM Wesley Iraheta; Jossie Sparrow PA [facility]        Today's Diagnoses     Atypical chest pain    -  1    Primary insomnia        Malignant neoplasm of left female breast, unspecified estrogen receptor status, unspecified site of breast (H)           Follow-ups after your visit        Additional Services     CARDIOLOGY EVAL ADULT REFERRAL       62 yo with metastatic breast cancer on Herceptin, tamoxifen, with episode of chest pain at rest, relieved by unknown sublingual medication from [place].   Preferred location:  [facility] [place] [phone]   [email or web address]    Please be aware that coverage of these services is subject to the terms and limitations of your health insurance plan.  Call member services at your health plan with any benefit or coverage questions.      Please bring the following to your appointment:  Any x-rays, CTs or MRIs which have been performed. Contact the facility where they were done to arrange for  prior to your scheduled appointment.    List of current medications  This referral request   Any documents/labs given to you for this referral                  Your next 10 appointments already scheduled     Dec 14, 2018  2:00 PM CST   CT CHEST/ABDOMEN/PELVIS W CONTRAST with UCCT1   [facility] CT ([facility] [place])    [address]  1st [address]  [facility] [address] [phone]           How do I prepare for my exam? (Food and drink instructions) To prepare: Do not eat or drink for 2 hours before your exam. If you need to take medicine, you may take it with small sips of water. (We may ask you to take liquid medicine  as well.)  How do I prepare for my exam? (Other instructions) Please arrive 30 minutes early for your CT.  Once in the department you might be asked to drink water 15-20 minutes prior to your exam.  If indicated you may be asked to drink an oral contrast in advance of your CT.  If this is the case, the imaging team will let you know or be in contact with you prior to your appointment  Patients over 70 or patients with diabetes or kidney problems: If you haven t had a blood test (creatinine test) within the last 30 days, the Cardiologist/Radiologist may require you to get this test prior to your exam.  If you have diabetes:  Continue to take your metformin medication on the day of your exam  What should I wear: Please wear loose clothing, such as a sweat suit or jogging clothes. Avoid snaps, zippers and other metal. We may ask you to undress and put on a hospital gown.  How long does the exam take: Most scans take less than 20 minutes.  What should I bring: Please bring any scans or X-rays taken at other hospitals, if similar tests were done. Also bring a list of your medicines, including vitamins, minerals and over-the-counter drugs. It is safest to leave personal items at home.  Do I need a : No  is needed.  What do I need to tell my doctor? Be sure to tell your doctor: * If you have any allergies. * If there s any chance you are pregnant. * If you are breastfeeding.  What should I do after the exam: No restrictions, You may resume normal activities.  What is this test: A CT (computed tomography) scan is a series of pictures that allows us to look inside your body. The scanner creates images of the body in cross sections, much like slices of bread. This helps us see any problems more clearly. You may receive contrast (X-ray dye) before or during your scan. You will be asked to drink the contrast.  Who should I call with questions: If you have any questions, please call the Imaging Department where you  will have your exam. Directions, parking instructions, and other information is available on our website, [email or web address].            Dec 24, 2018  9:00 AM CST   [facility] Lab Draw with UC [facility]IC LAB DRAW   [facility] Lab Draw ([facility])    [address] [place] [address]  [address]  [facility] [address]   [phone]            Dec 24, 2018  9:30 AM CST   Infusion 60 with UC ONCOLOGY INFUSION, UC 30 ATC   [facility] ([facility])    [address] [place] [address]  [address]  [facility] [address] [phone]            Rex 15, 2019  2:30 PM CST   [facility] Lab Draw with UC [facility] LAB DRAW   [facility] Lab Draw ([facility])    [address] [place] [address]  [address]  [facility] [address]   [phone]            Rex 15, 2019  3:00 PM CST   (Arrive by 2:45 PM)   Return Visit with Lisandro Aguiar MD   [facility] ([facility])    [address] [place] [address]  [address] [place] [address] [phone]            Rex 15, 2019  4:00 PM CST   Infusion 60 with UC ONCOLOGY INFUSION, UC 12 ATC   [facility] ([facility])    [address] [place] [address]  [address] [place] [address] [phone]              Who to contact     If you have questions or need follow up information about today's clinic visit or your schedule please contact [facility] directly at [phone].  Normal or non-critical lab and imaging results will be communicated to you by MyChart, letter or phone within 4 business days after the clinic has received the results. If you do not hear from us within 7 days, please contact the clinic through MyChart or phone. If you have a critical or abnormal lab result, we will notify you by phone as soon as possible.  Submit refill requests through [employer] or call your pharmacy and they will forward the  "refill request to us. Please allow 3 business days for your refill to be completed.          Additional Information About Your Visit        Stampedhart Information     [employer] gives you secure access to your electronic health record. If you see a primary care provider, you can also send messages to your care team and make appointments. If you have questions, please call your primary care clinic.  If you do not have a primary care provider, please call [phone] and they will assist you.        Care EveryWhere ID     This is your Care EveryWhere ID. This could be used by other organizations to access your [place] medical records  [id number]        Your Vitals Were     Pulse Temperature Respirations Height Pulse Oximetry BMI (Body Mass Index)    77 98.4  F (36.9  C) (Oral) 18 1.575 m (5' 2\") 95% 32.98 kg/m2       Blood Pressure from Last 3 Encounters:   12/05/18 111/74   11/13/18 135/67   10/23/18 121/54    Weight from Last 3 Encounters:   12/05/18 81.8 kg (180 lb 4.8 oz)   11/13/18 82.5 kg (181 lb 12.8 oz)   10/23/18 83 kg (182 lb 14.4 oz)              We Performed the Following     CARDIOLOGY EVAL ADULT REFERRAL     EKG 12-lead complete w/read - Clinics          Where to get your medicines      These medications were sent to [facility] [address] [place] [address] [address] [place] [id number] [place] [address]     Phone:  [phone]     melatonin 3 MG tablet          Primary Care Provider Fax #    Physician No Ref-Primary [phone]       No address on file        Equal Access to Services     NOE VAZQUEZ AH: Hadarturo Ramsey, ro jefferson, qaalexa ang. So [facility] [phone].    ATENCIÓN: Si habla español, tiene a lagunas disposición servicios gratuitos de asistencia lingüística. Luis joshi [phone].    We comply with applicable federal civil rights laws and [place] laws. We do not discriminate on " the basis of race, color, national origin, age, disability, sex, sexual orientation, or gender identity.            Thank you!     Thank you for choosing [facility]  for your care. Our goal is always to provide you with excellent care. Hearing back from our patients is one way we can continue to improve our services. Please take a few minutes to complete the written survey that you may receive in the mail after your visit with us. Thank you!             Your Updated Medication List - Protect others around you: Learn how to safely use, store and throw away your medicines at www.disposemymeds.org.          This list is accurate as of 12/5/18  4:43 PM.  Always use your most recent med list.                   Brand Name Dispense Instructions for use Diagnosis    melatonin 3 MG tablet     30 tablet    Take 1 tablet (3 mg) by mouth nightly as needed for sleep    Primary insomnia       order for DME     1 each    L UE class 2 compression sleeve and gauntlet Night garment Bandaging supplies    Lymphedema of left upper extremity       order for DME     2 Piece    Equipment being ordered: 2 Mastectomy bras and 1 prosthetheses.    Malignant neoplasm of left female breast, unspecified estrogen receptor status, unspecified site of breast (H)       tamoxifen 20 MG tablet    NOLVADEX    90 tablet    Take 1 tablet (20 mg) by mouth daily    Cancer of central portion of left breast (H)       VITAMIN D (CHOLECALCIFEROL) PO      Take 1,000 Units by mouth daily

## 2018-12-06 ENCOUNTER — CARE COORDINATION (OUTPATIENT)
Dept: ONCOLOGY | Facility: CLINIC | Age: 63
End: 2018-12-06

## 2018-12-06 LAB — INTERPRETATION ECG - MUSE: NORMAL

## 2018-12-13 ENCOUNTER — CARE COORDINATION (OUTPATIENT)
Dept: ONCOLOGY | Facility: CLINIC | Age: 63
End: 2018-12-13

## 2018-12-13 NOTE — PROGRESS NOTES
Re-faxed note to dentist requesting clarification of when patient can have dental work. Dr Aguiar recommended having dental work one week prior to next Herceptin currently scheduled on 12/24/18. Faxed to [phone] today. Cortney Ramos RN, BSN  [facility] Nurse Coordinator

## 2018-12-24 ENCOUNTER — INFUSION THERAPY VISIT (OUTPATIENT)
Dept: ONCOLOGY | Facility: CLINIC | Age: 63
End: 2018-12-24
Attending: INTERNAL MEDICINE
Payer: COMMERCIAL

## 2018-12-24 VITALS
SYSTOLIC BLOOD PRESSURE: 106 MMHG | RESPIRATION RATE: 18 BRPM | DIASTOLIC BLOOD PRESSURE: 69 MMHG | HEART RATE: 78 BPM | BODY MASS INDEX: 32.34 KG/M2 | OXYGEN SATURATION: 97 % | TEMPERATURE: 98 F | WEIGHT: 176.8 LBS

## 2018-12-24 DIAGNOSIS — C50.912 MALIGNANT NEOPLASM OF LEFT FEMALE BREAST, UNSPECIFIED ESTROGEN RECEPTOR STATUS, UNSPECIFIED SITE OF BREAST (H): Primary | ICD-10-CM

## 2018-12-24 DIAGNOSIS — C79.51 BONE METASTASIS: ICD-10-CM

## 2018-12-24 LAB
ALBUMIN SERPL-MCNC: 3.1 G/DL (ref 3.4–5)
ALP SERPL-CCNC: 34 U/L (ref 40–150)
ALT SERPL W P-5'-P-CCNC: 27 U/L (ref 0–50)
ANION GAP SERPL CALCULATED.3IONS-SCNC: 5 MMOL/L (ref 3–14)
AST SERPL W P-5'-P-CCNC: 20 U/L (ref 0–45)
BASOPHILS # BLD AUTO: 0 10E9/L (ref 0–0.2)
BASOPHILS NFR BLD AUTO: 0.4 %
BILIRUB SERPL-MCNC: 0.2 MG/DL (ref 0.2–1.3)
BUN SERPL-MCNC: 18 MG/DL (ref 7–30)
CALCIUM SERPL-MCNC: 8.5 MG/DL (ref 8.5–10.1)
CANCER AG27-29 SERPL-ACNC: 7 U/ML (ref 0–39)
CEA SERPL-MCNC: <0.5 UG/L (ref 0–2.5)
CHLORIDE SERPL-SCNC: 107 MMOL/L (ref 94–109)
CO2 SERPL-SCNC: 29 MMOL/L (ref 20–32)
CREAT SERPL-MCNC: 0.8 MG/DL (ref 0.52–1.04)
DIFFERENTIAL METHOD BLD: ABNORMAL
EOSINOPHIL # BLD AUTO: 0.2 10E9/L (ref 0–0.7)
EOSINOPHIL NFR BLD AUTO: 3.5 %
ERYTHROCYTE [DISTWIDTH] IN BLOOD BY AUTOMATED COUNT: 13.1 % (ref 10–15)
GFR SERPL CREATININE-BSD FRML MDRD: 79 ML/MIN/{1.73_M2}
GLUCOSE SERPL-MCNC: 134 MG/DL (ref 70–99)
HCT VFR BLD AUTO: 35.4 % (ref 35–47)
HGB BLD-MCNC: 11.3 G/DL (ref 11.7–15.7)
IMM GRANULOCYTES # BLD: 0 10E9/L (ref 0–0.4)
IMM GRANULOCYTES NFR BLD: 0.1 %
LYMPHOCYTES # BLD AUTO: 2 10E9/L (ref 0.8–5.3)
LYMPHOCYTES NFR BLD AUTO: 28.9 %
MCH RBC QN AUTO: 30.1 PG (ref 26.5–33)
MCHC RBC AUTO-ENTMCNC: 31.9 G/DL (ref 31.5–36.5)
MCV RBC AUTO: 94 FL (ref 78–100)
MONOCYTES # BLD AUTO: 0.4 10E9/L (ref 0–1.3)
MONOCYTES NFR BLD AUTO: 6.1 %
NEUTROPHILS # BLD AUTO: 4.2 10E9/L (ref 1.6–8.3)
NEUTROPHILS NFR BLD AUTO: 61 %
NRBC # BLD AUTO: 0 10*3/UL
NRBC BLD AUTO-RTO: 0 /100
PLATELET # BLD AUTO: 165 10E9/L (ref 150–450)
POTASSIUM SERPL-SCNC: 3.9 MMOL/L (ref 3.4–5.3)
PROT SERPL-MCNC: 7.3 G/DL (ref 6.8–8.8)
RBC # BLD AUTO: 3.76 10E12/L (ref 3.8–5.2)
SODIUM SERPL-SCNC: 141 MMOL/L (ref 133–144)
WBC # BLD AUTO: 6.9 10E9/L (ref 4–11)

## 2018-12-24 PROCEDURE — 82378 CARCINOEMBRYONIC ANTIGEN: CPT | Performed by: INTERNAL MEDICINE

## 2018-12-24 PROCEDURE — 80053 COMPREHEN METABOLIC PANEL: CPT | Performed by: INTERNAL MEDICINE

## 2018-12-24 PROCEDURE — 85025 COMPLETE CBC W/AUTO DIFF WBC: CPT | Performed by: INTERNAL MEDICINE

## 2018-12-24 PROCEDURE — 96372 THER/PROPH/DIAG INJ SC/IM: CPT

## 2018-12-24 PROCEDURE — 96413 CHEMO IV INFUSION 1 HR: CPT

## 2018-12-24 PROCEDURE — 25000128 H RX IP 250 OP 636: Mod: ZF | Performed by: INTERNAL MEDICINE

## 2018-12-24 PROCEDURE — 86300 IMMUNOASSAY TUMOR CA 15-3: CPT | Performed by: INTERNAL MEDICINE

## 2018-12-24 PROCEDURE — T1013 SIGN LANG/ORAL INTERPRETER: HCPCS | Mod: U3

## 2018-12-24 RX ORDER — SODIUM CHLORIDE 9 MG/ML
1000 INJECTION, SOLUTION INTRAVENOUS CONTINUOUS PRN
Status: CANCELLED
Start: 2018-12-24

## 2018-12-24 RX ORDER — DIPHENHYDRAMINE HCL 50 MG
50 CAPSULE ORAL ONCE
Status: CANCELLED
Start: 2018-12-24

## 2018-12-24 RX ORDER — EPINEPHRINE 1 MG/ML
0.3 INJECTION, SOLUTION, CONCENTRATE INTRAVENOUS EVERY 5 MIN PRN
Status: CANCELLED | OUTPATIENT
Start: 2019-01-15

## 2018-12-24 RX ORDER — METHYLPREDNISOLONE SODIUM SUCCINATE 125 MG/2ML
125 INJECTION, POWDER, LYOPHILIZED, FOR SOLUTION INTRAMUSCULAR; INTRAVENOUS
Status: CANCELLED
Start: 2018-12-24

## 2018-12-24 RX ORDER — ALBUTEROL SULFATE 90 UG/1
1-2 AEROSOL, METERED RESPIRATORY (INHALATION)
Status: CANCELLED
Start: 2019-01-15

## 2018-12-24 RX ORDER — ALBUTEROL SULFATE 90 UG/1
1-2 AEROSOL, METERED RESPIRATORY (INHALATION)
Status: CANCELLED
Start: 2018-12-24

## 2018-12-24 RX ORDER — LORAZEPAM 2 MG/ML
0.5 INJECTION INTRAMUSCULAR EVERY 4 HOURS PRN
Status: CANCELLED
Start: 2018-12-24

## 2018-12-24 RX ORDER — SODIUM CHLORIDE 9 MG/ML
1000 INJECTION, SOLUTION INTRAVENOUS CONTINUOUS PRN
Status: CANCELLED
Start: 2019-01-15

## 2018-12-24 RX ORDER — ACETAMINOPHEN 325 MG/1
650 TABLET ORAL
Status: CANCELLED | OUTPATIENT
Start: 2019-01-15

## 2018-12-24 RX ORDER — HEPARIN SODIUM (PORCINE) LOCK FLUSH IV SOLN 100 UNIT/ML 100 UNIT/ML
5 SOLUTION INTRAVENOUS EVERY 8 HOURS
Status: DISCONTINUED | OUTPATIENT
Start: 2018-12-24 | End: 2018-12-24 | Stop reason: HOSPADM

## 2018-12-24 RX ORDER — MEPERIDINE HYDROCHLORIDE 25 MG/ML
25 INJECTION INTRAMUSCULAR; INTRAVENOUS; SUBCUTANEOUS EVERY 30 MIN PRN
Status: CANCELLED | OUTPATIENT
Start: 2019-01-15

## 2018-12-24 RX ORDER — HEPARIN SODIUM (PORCINE) LOCK FLUSH IV SOLN 100 UNIT/ML 100 UNIT/ML
5 SOLUTION INTRAVENOUS EVERY 8 HOURS
Status: CANCELLED | OUTPATIENT
Start: 2019-01-15

## 2018-12-24 RX ORDER — EPINEPHRINE 1 MG/ML
0.3 INJECTION, SOLUTION, CONCENTRATE INTRAVENOUS EVERY 5 MIN PRN
Status: CANCELLED | OUTPATIENT
Start: 2018-12-24

## 2018-12-24 RX ORDER — DIPHENHYDRAMINE HYDROCHLORIDE 50 MG/ML
50 INJECTION INTRAMUSCULAR; INTRAVENOUS
Status: CANCELLED
Start: 2018-12-24

## 2018-12-24 RX ORDER — DIPHENHYDRAMINE HCL 50 MG
50 CAPSULE ORAL ONCE
Status: CANCELLED
Start: 2019-01-15

## 2018-12-24 RX ORDER — ALBUTEROL SULFATE 0.83 MG/ML
2.5 SOLUTION RESPIRATORY (INHALATION)
Status: CANCELLED | OUTPATIENT
Start: 2019-01-15

## 2018-12-24 RX ORDER — DIPHENHYDRAMINE HYDROCHLORIDE 50 MG/ML
50 INJECTION INTRAMUSCULAR; INTRAVENOUS
Status: CANCELLED
Start: 2019-01-15

## 2018-12-24 RX ORDER — ACETAMINOPHEN 325 MG/1
650 TABLET ORAL
Status: CANCELLED | OUTPATIENT
Start: 2018-12-24

## 2018-12-24 RX ORDER — ALBUTEROL SULFATE 0.83 MG/ML
2.5 SOLUTION RESPIRATORY (INHALATION)
Status: CANCELLED | OUTPATIENT
Start: 2018-12-24

## 2018-12-24 RX ORDER — HEPARIN SODIUM (PORCINE) LOCK FLUSH IV SOLN 100 UNIT/ML 100 UNIT/ML
5 SOLUTION INTRAVENOUS EVERY 8 HOURS
Status: CANCELLED | OUTPATIENT
Start: 2018-12-24

## 2018-12-24 RX ORDER — METHYLPREDNISOLONE SODIUM SUCCINATE 125 MG/2ML
125 INJECTION, POWDER, LYOPHILIZED, FOR SOLUTION INTRAMUSCULAR; INTRAVENOUS
Status: CANCELLED
Start: 2019-01-15

## 2018-12-24 RX ORDER — EPINEPHRINE 0.3 MG/.3ML
0.3 INJECTION SUBCUTANEOUS EVERY 5 MIN PRN
Status: CANCELLED | OUTPATIENT
Start: 2019-01-15

## 2018-12-24 RX ORDER — LORAZEPAM 2 MG/ML
0.5 INJECTION INTRAMUSCULAR EVERY 4 HOURS PRN
Status: CANCELLED
Start: 2019-01-15

## 2018-12-24 RX ORDER — EPINEPHRINE 0.3 MG/.3ML
0.3 INJECTION SUBCUTANEOUS EVERY 5 MIN PRN
Status: CANCELLED | OUTPATIENT
Start: 2018-12-24

## 2018-12-24 RX ORDER — MEPERIDINE HYDROCHLORIDE 25 MG/ML
25 INJECTION INTRAMUSCULAR; INTRAVENOUS; SUBCUTANEOUS EVERY 30 MIN PRN
Status: CANCELLED | OUTPATIENT
Start: 2018-12-24

## 2018-12-24 RX ADMIN — TRASTUZUMAB 450 MG: 150 INJECTION, POWDER, LYOPHILIZED, FOR SOLUTION INTRAVENOUS at 10:43

## 2018-12-24 RX ADMIN — DENOSUMAB 120 MG: 120 INJECTION SUBCUTANEOUS at 10:46

## 2018-12-24 RX ADMIN — SODIUM CHLORIDE, PRESERVATIVE FREE 5 ML: 5 INJECTION INTRAVENOUS at 11:16

## 2018-12-24 ASSESSMENT — PAIN SCALES - GENERAL: PAINLEVEL: NO PAIN (0)

## 2018-12-24 NOTE — PROGRESS NOTES
Infusion Nursing Note:  Hoda KIMBLE Gonsalo presents for D1C23 Herceptin, Josiane    [place]  present for mauricio.    Note: pt feeling well today, no new issues or concerns to report. Pt had teeth extracted last week, per patient and note from Cortney Ramos, this is when Dr. Aguiar wanted her to get this done.    Treatment Conditions:  Lab Results   Component Value Date    HGB 11.3 12/24/2018     Lab Results   Component Value Date    WBC 6.9 12/24/2018      Lab Results   Component Value Date    ANEU 4.2 12/24/2018     Lab Results   Component Value Date     12/24/2018      Lab Results   Component Value Date     12/24/2018                   Lab Results   Component Value Date    POTASSIUM 3.9 12/24/2018           No results found for: MAG         Lab Results   Component Value Date    CR 0.80 12/24/2018                   Lab Results   Component Value Date    TAYLER 8.5 12/24/2018                Lab Results   Component Value Date    BILITOTAL 0.2 12/24/2018           Lab Results   Component Value Date    ALBUMIN 3.1 12/24/2018                    Lab Results   Component Value Date    ALT 27 12/24/2018           Lab Results   Component Value Date    AST 20 12/24/2018       Results reviewed, labs MET treatment parameters, ok to proceed with treatment.  ECHO/MUGA completed 12/5/18  EF 62%.    Intravenous Access:  Implanted Port.    Post Infusion Assessment:  Patient tolerated infusion without incident.  Patient tolerated injection without incident to right arm  Blood return noted pre and post infusion.  No evidence of extravasations.  Access discontinued per protocol.    Discharge Plan:   Patient declined prescription refills.  Discharge instructions reviewed with: Patient.  Patient and/or family verbalized understanding of discharge instructions and all questions answered.  AVS to patient via [facility].  Patient will return 1/15 for next appointment.   Patient discharged in stable condition accompanied by:  self.    Lety Saravia RN

## 2018-12-24 NOTE — PATIENT INSTRUCTIONS
Contact numbers:  Triage Main/After hours Line: [phone]    Main (scheduling) line: [phone]    Call with chills and/or temperature greater than or equal to 100.5 and questions or concerns.    If after hours, weekends, or holidays, call main hospital  at  [phone] and ask for Oncology doctor on call.         December 2018 Sunday Monday Tuesday Wednesday Thursday Friday Saturday                                 1       2     3     4     5    ECH COMPLETE   1:00 PM   (90 min.)   UCECHCR2   [facility] Echo    [facility] DRAW   1:30 PM   (15 min.)    [facility]   [facility]    LAB WITH  CLINIC   2:15 PM   (30 min.)    LAB   [facility] Lab    [facility] RETURN   2:35 PM   (90 min.)   Jossie Sparrow PA   [facility]    [facility] ONC INFUSION 60   3:45 PM   (75 min.)    ONCOLOGY INFUSION   [facility] 6     7     8       9     10     11     12     13     14     15       16     17     18     19     20     21     22       23     24    [facility] DRAW   9:00 AM   (30 min.)    [facility] DRAW   [facility] Draw    [facility] ONC INFUSION 60   9:30 AM   (75 min.)    ONCOLOGY INFUSION   [facility] 25     26     27     28     29       30     31 January 2019 Sunday Monday Tuesday Wednesday Thursday Friday Saturday             1     2     3     4     5       6     7     8    MYCHART PHYSICAL ADULT  10:00 AM   (20 min.)   Steffi Webb PA-C   [facility] [place] 9     10     11     12       13     14    CT CHEST/ABDOMEN/PELVIS W   2:00 PM   (20 min.)   UCCT2   [facility] CT 15    [facility] DRAW   2:30 PM   (15 min.)    [facility] DRAW   [facility] Draw    [facility] RETURN   2:45 PM   (30 min.)   Lisandro Aguiar MD   [facility]    [facility] ONC INFUSION 60   4:00 PM   (60 min.)    ONCOLOGY INFUSION     [facility] 16     17     18     19       20     21     22     23     24     25     26       27     28     29     30     31                               Lab Results:  Recent Results (from the past 12 hour(s))   CBC with platelets differential    Collection Time: 12/24/18  9:45 AM   Result Value Ref Range    WBC 6.9 4.0 - 11.0 10e9/L    RBC Count 3.76 (L) 3.8 - 5.2 10e12/L    Hemoglobin 11.3 (L) 11.7 - 15.7 g/dL    Hematocrit 35.4 35.0 - 47.0 %    MCV 94 78 - 100 fl    MCH 30.1 26.5 - 33.0 pg    MCHC 31.9 31.5 - 36.5 g/dL    RDW 13.1 10.0 - 15.0 %    Platelet Count 165 150 - 450 10e9/L    Diff Method Automated Method     % Neutrophils 61.0 %    % Lymphocytes 28.9 %    % Monocytes 6.1 %    % Eosinophils 3.5 %    % Basophils 0.4 %    % Immature Granulocytes 0.1 %    Nucleated RBCs 0 0 /100    Absolute Neutrophil 4.2 1.6 - 8.3 10e9/L    Absolute Lymphocytes 2.0 0.8 - 5.3 10e9/L    Absolute Monocytes 0.4 0.0 - 1.3 10e9/L    Absolute Eosinophils 0.2 0.0 - 0.7 10e9/L    Absolute Basophils 0.0 0.0 - 0.2 10e9/L    Abs Immature Granulocytes 0.0 0 - 0.4 10e9/L    Absolute Nucleated RBC 0.0    Comprehensive metabolic panel    Collection Time: 12/24/18  9:45 AM   Result Value Ref Range    Sodium 141 133 - 144 mmol/L    Potassium 3.9 3.4 - 5.3 mmol/L    Chloride 107 94 - 109 mmol/L    Carbon Dioxide 29 20 - 32 mmol/L    Anion Gap 5 3 - 14 mmol/L    Glucose 134 (H) 70 - 99 mg/dL    Urea Nitrogen 18 7 - 30 mg/dL    Creatinine 0.80 0.52 - 1.04 mg/dL    GFR Estimate 79 >60 mL/min/[1.73_m2]    GFR Estimate If Black >90 >60 mL/min/[1.73_m2]    Calcium 8.5 8.5 - 10.1 mg/dL    Bilirubin Total 0.2 0.2 - 1.3 mg/dL    Albumin 3.1 (L) 3.4 - 5.0 g/dL    Protein Total 7.3 6.8 - 8.8 g/dL    Alkaline Phosphatase 34 (L) 40 - 150 U/L    ALT 27 0 - 50 U/L    AST 20 0 - 45 U/L

## 2018-12-25 RX ORDER — MEPERIDINE HYDROCHLORIDE 25 MG/ML
25 INJECTION INTRAMUSCULAR; INTRAVENOUS; SUBCUTANEOUS EVERY 30 MIN PRN
Status: CANCELLED | OUTPATIENT
Start: 2019-02-05

## 2018-12-25 RX ORDER — EPINEPHRINE 0.3 MG/.3ML
0.3 INJECTION SUBCUTANEOUS EVERY 5 MIN PRN
Status: CANCELLED | OUTPATIENT
Start: 2019-02-05

## 2018-12-25 RX ORDER — DIPHENHYDRAMINE HYDROCHLORIDE 50 MG/ML
50 INJECTION INTRAMUSCULAR; INTRAVENOUS
Status: CANCELLED
Start: 2019-02-05

## 2018-12-25 RX ORDER — DIPHENHYDRAMINE HCL 25 MG
50 CAPSULE ORAL ONCE
Status: CANCELLED
Start: 2019-02-05

## 2018-12-25 RX ORDER — METHYLPREDNISOLONE SODIUM SUCCINATE 125 MG/2ML
125 INJECTION, POWDER, LYOPHILIZED, FOR SOLUTION INTRAMUSCULAR; INTRAVENOUS
Status: CANCELLED
Start: 2019-02-05

## 2018-12-25 RX ORDER — ALBUTEROL SULFATE 90 UG/1
1-2 AEROSOL, METERED RESPIRATORY (INHALATION)
Status: CANCELLED
Start: 2019-02-05

## 2018-12-25 RX ORDER — ACETAMINOPHEN 325 MG/1
650 TABLET ORAL
Status: CANCELLED | OUTPATIENT
Start: 2019-02-05

## 2018-12-25 RX ORDER — SODIUM CHLORIDE 9 MG/ML
1000 INJECTION, SOLUTION INTRAVENOUS CONTINUOUS PRN
Status: CANCELLED
Start: 2019-02-05

## 2018-12-25 RX ORDER — EPINEPHRINE 1 MG/ML
0.3 INJECTION, SOLUTION INTRAMUSCULAR; SUBCUTANEOUS EVERY 5 MIN PRN
Status: CANCELLED | OUTPATIENT
Start: 2019-02-05

## 2018-12-25 RX ORDER — HEPARIN SODIUM (PORCINE) LOCK FLUSH IV SOLN 100 UNIT/ML 100 UNIT/ML
5 SOLUTION INTRAVENOUS EVERY 8 HOURS
Status: CANCELLED | OUTPATIENT
Start: 2019-02-05

## 2018-12-25 RX ORDER — LORAZEPAM 2 MG/ML
0.5 INJECTION INTRAMUSCULAR EVERY 4 HOURS PRN
Status: CANCELLED
Start: 2019-02-05

## 2018-12-25 RX ORDER — ALBUTEROL SULFATE 0.83 MG/ML
2.5 SOLUTION RESPIRATORY (INHALATION)
Status: CANCELLED | OUTPATIENT
Start: 2019-02-05

## 2019-01-02 ASSESSMENT — ENCOUNTER SYMPTOMS
CHILLS: 0
EYE PAIN: 0
BREAST MASS: 0
SHORTNESS OF BREATH: 0
JOINT SWELLING: 0
ABDOMINAL PAIN: 0
PALPITATIONS: 0
CONSTIPATION: 0
FREQUENCY: 0
ARTHRALGIAS: 0
SORE THROAT: 0
HEMATURIA: 0
DIZZINESS: 0
HEMATOCHEZIA: 0
HEADACHES: 0
DIARRHEA: 0
MYALGIAS: 0
COUGH: 0
WEAKNESS: 0
DYSURIA: 0
NAUSEA: 0
FEVER: 0
NERVOUS/ANXIOUS: 0
HEARTBURN: 0

## 2019-01-08 ENCOUNTER — OFFICE VISIT (OUTPATIENT)
Dept: FAMILY MEDICINE | Facility: CLINIC | Age: 64
End: 2019-01-08
Payer: COMMERCIAL

## 2019-01-08 VITALS
DIASTOLIC BLOOD PRESSURE: 72 MMHG | WEIGHT: 176.2 LBS | TEMPERATURE: 98 F | HEART RATE: 70 BPM | OXYGEN SATURATION: 97 % | SYSTOLIC BLOOD PRESSURE: 112 MMHG | HEIGHT: 63 IN | BODY MASS INDEX: 31.22 KG/M2

## 2019-01-08 DIAGNOSIS — Z13.820 SCREENING FOR OSTEOPOROSIS: ICD-10-CM

## 2019-01-08 DIAGNOSIS — C79.51 BONE METASTASIS: ICD-10-CM

## 2019-01-08 DIAGNOSIS — C50.919 METASTATIC BREAST CANCER: ICD-10-CM

## 2019-01-08 DIAGNOSIS — Z00.01 ENCOUNTER FOR ROUTINE ADULT MEDICAL EXAM WITH ABNORMAL FINDINGS: Primary | ICD-10-CM

## 2019-01-08 DIAGNOSIS — E55.9 VITAMIN D DEFICIENCY: ICD-10-CM

## 2019-01-08 DIAGNOSIS — D64.9 ANEMIA, UNSPECIFIED TYPE: ICD-10-CM

## 2019-01-08 DIAGNOSIS — H57.9 EYE PRESSURE: ICD-10-CM

## 2019-01-08 LAB
DEPRECATED CALCIDIOL+CALCIFEROL SERPL-MC: 22 UG/L (ref 20–75)
FERRITIN SERPL-MCNC: 215 NG/ML (ref 8–252)
T3FREE SERPL-MCNC: 2.6 PG/ML (ref 2.3–4.2)
TSH SERPL DL<=0.005 MIU/L-ACNC: 1.12 MU/L (ref 0.4–4)

## 2019-01-08 PROCEDURE — T1013 SIGN LANG/ORAL INTERPRETER: HCPCS | Mod: U3 | Performed by: PHYSICIAN ASSISTANT

## 2019-01-08 PROCEDURE — 99396 PREV VISIT EST AGE 40-64: CPT | Performed by: PHYSICIAN ASSISTANT

## 2019-01-08 PROCEDURE — 36415 COLL VENOUS BLD VENIPUNCTURE: CPT | Performed by: PHYSICIAN ASSISTANT

## 2019-01-08 PROCEDURE — 84443 ASSAY THYROID STIM HORMONE: CPT | Performed by: PHYSICIAN ASSISTANT

## 2019-01-08 PROCEDURE — 84481 FREE ASSAY (FT-3): CPT | Performed by: PHYSICIAN ASSISTANT

## 2019-01-08 PROCEDURE — 82306 VITAMIN D 25 HYDROXY: CPT | Performed by: PHYSICIAN ASSISTANT

## 2019-01-08 PROCEDURE — 82728 ASSAY OF FERRITIN: CPT | Performed by: PHYSICIAN ASSISTANT

## 2019-01-08 ASSESSMENT — MIFFLIN-ST. JEOR: SCORE: 1328.24

## 2019-01-08 NOTE — PROGRESS NOTES
SUBJECTIVE:   CC: Hoda Brush is an 63 year old woman who presents with  and interpretor for preventive health visit.     Healthy Habits:    Do you get at least three servings of calcium containing foods daily (dairy, green leafy vegetables, etc.)? yes    Amount of exercise or daily activities, outside of work: 7 day(s) per week    Problems taking medications regularly No    Medication side effects: No    Have you had an eye exam in the past two years? no    Do you see a dentist twice per year? yes    Do you have sleep apnea, excessive snoring or daytime drowsiness?no    HPI given with the help of an interpretor    Thyroid  -Patient denies dry skin, fatigue, thinning hair    MS  -She has not gotten a DEXA scan in the past  -Her oncologist has not given her any medications for bone density    Dental  -Patient is going to get dentures soon    Neuro  -She sometimes has trouble sleeping, she takes Melatonin which helps    Diet/Exercise  -Patient is able to eat ground meat    Cancer  -Patient is currently getting infusions, she is tolerating them fine with no side effects  -She does not have any new symptoms    Medications  -She is not taking a multivitamin    -Patient denies diarrhea, constipation, bladder problems, stomach pain    Today's PHQ-2 Score:   PHQ-2 ( 1999 Pfizer) 1/8/2019 1/2/2019   Q1: Little interest or pleasure in doing things 0 0   Q2: Feeling down, depressed or hopeless 0 0   PHQ-2 Score 0 0   Q1: Little interest or pleasure in doing things - Not at all   Q2: Feeling down, depressed or hopeless - Not at all   PHQ-2 Score - 0       Abuse: Current or Past(Physical, Sexual or Emotional)- No  Do you feel safe in your environment? Yes    Social History     Tobacco Use     Smoking status: Never Smoker     Smokeless tobacco: Never Used   Substance Use Topics     Alcohol use: No     If you drink alcohol do you typically have >3 drinks per day or >7 drinks per week? No                      Reviewed orders with patient.  Reviewed health maintenance and updated orders accordingly - Yes  Labs reviewed in EPIC  Patient Active Problem List   Diagnosis     Malignant neoplasm of left female breast, unspecified estrogen receptor status, unspecified site of breast (H)     Bone metastasis (H)     Cellulitis of left upper extremity     Metastatic breast cancer (H)     Past Surgical History:   Procedure Laterality Date     APPENDECTOMY       COLONOSCOPY N/A 2/1/2018    Procedure: COLONOSCOPY;  Colonoscopy;  Surgeon: Phillip Rowe MD;  Location: [facility]     ESOPHAGOSCOPY, GASTROSCOPY, DUODENOSCOPY (EGD), COMBINED N/A 2/16/2018    Procedure: COMBINED ESOPHAGOSCOPY, GASTROSCOPY, DUODENOSCOPY (EGD), BIOPSY SINGLE OR MULTIPLE;;  Surgeon: Paty Herman MD;  Location: [facility]     INSERT PORT VASCULAR ACCESS Right 9/15/2017    Procedure: INSERT PORT VASCULAR ACCESS;  Central venous chest port placement, right;  Surgeon: Dmitriy Hernandez PA-C;  [facility]     MASTECTOMY Left 06/27/2014    and lymph node resection       Social History     Tobacco Use     Smoking status: Never Smoker     Smokeless tobacco: Never Used   Substance Use Topics     Alcohol use: No     Family History   Problem Relation Age of Onset     Breast Cancer Mother 45     Lung Cancer Father         smoker     Breast Cancer Sister 61         Current Outpatient Medications   Medication Sig Dispense Refill     melatonin 3 MG tablet Take 1 tablet (3 mg) by mouth nightly as needed for sleep 30 tablet 0     tamoxifen (NOLVADEX) 20 MG tablet Take 1 tablet (20 mg) by mouth daily 90 tablet 3     VITAMIN D, CHOLECALCIFEROL, PO Take 1,000 Units by mouth daily       order for DME Equipment being ordered: 2 Mastectomy bras and 1 prosthetheses. (Patient not taking: Reported on 11/13/2018) 2 Piece 0     order for DME L UE class 2 compression sleeve and gauntlet  Night garment  Bandaging supplies (Patient not taking: Reported on 11/13/2018) 1  each 1       Mammogram Screening: Patient over age 50, mutual decision to screen reflected in health maintenance.    Pertinent mammograms are reviewed under the imaging tab.  History of abnormal Pap smear: NO - age 30-65 PAP every 5 years with negative HPV co-testing recommended  PAP / HPV Latest Ref Rng & Units 8/8/2017   PAP - NIL   HPV 16 DNA NEG Negative   HPV 18 DNA NEG Negative   OTHER HR HPV NEG Negative     Reviewed and updated as needed this visit by clinical staff  Tobacco  Allergies  Meds  Med Hx  Surg Hx  Fam Hx  Soc Hx        Reviewed and updated as needed this visit by Provider        Past Medical History:   Diagnosis Date     Breast cancer metastasized to bone (H)       Past Surgical History:   Procedure Laterality Date     APPENDECTOMY       COLONOSCOPY N/A 2/1/2018    Procedure: COLONOSCOPY;  Colonoscopy;  Surgeon: Phillip Rowe MD;  Location:  GI     ESOPHAGOSCOPY, GASTROSCOPY, DUODENOSCOPY (EGD), COMBINED N/A 2/16/2018    Procedure: COMBINED ESOPHAGOSCOPY, GASTROSCOPY, DUODENOSCOPY (EGD), BIOPSY SINGLE OR MULTIPLE;;  Surgeon: Paty Herman MD;  Location:  GI     INSERT PORT VASCULAR ACCESS Right 9/15/2017    Procedure: INSERT PORT VASCULAR ACCESS;  Central venous chest port placement, right;  Surgeon: Dmitriy Hernandez PA-C;  [facility]     MASTECTOMY Left 06/27/2014    and lymph node resection       ROS:  CONSTITUTIONAL: NEGATIVE for fever, chills, change in weight  INTEGUMENTARY/SKIN: NEGATIVE for worrisome rashes, moles or lesions  EYES: NEGATIVE for vision changes or irritation  ENT: NEGATIVE for ear, mouth and throat problems, see HPI above  RESP: NEGATIVE for significant cough or SOB  BREAST: NEGATIVE for masses, tenderness or discharge  CV: NEGATIVE for chest pain, palpitations or peripheral edema  GI: NEGATIVE for nausea, abdominal pain, heartburn, or change in bowel habits  : NEGATIVE for unusual urinary or vaginal symptoms. No vaginal  "bleeding.  MUSCULOSKELETAL: NEGATIVE for significant arthralgias or myalgia  NEURO: NEGATIVE for weakness, dizziness or paresthesias  PSYCHIATRIC: NEGATIVE for changes in mood or affect     This document serves as a record of the services and decisions personally performed and made by Steffi Webb PA-C. It was created on her behalf by Anirudh Vega, trained medical scribe. The creation of this document is based on the provider's statements to the medical scribe.  Anirudh Vega 10:10 AM January 8, 2019    OBJECTIVE:   /72   Pulse 70   Temp 98  F (36.7  C) (Oral)   Ht 1.6 m (5' 2.99\")   Wt 79.9 kg (176 lb 3.2 oz)   SpO2 97%   BMI 31.22 kg/m    EXAM:  GENERAL APPEARANCE: healthy, alert and no distress  EYES: Eyes grossly normal to inspection, PERRL and conjunctivae and sclerae normal  HENT: ear canals and TM's normal, nose and mouth without ulcers or lesions, oropharynx clear and oral mucous membranes moist  NECK: no adenopathy, no asymmetry, masses, or scars and thyroid normal to palpation  RESP: lungs clear to auscultation - no rales, rhonchi or wheezes  CV: regular rate and rhythm, normal S1 S2, no S3 or S4, no murmur, click or rub, no peripheral edema and peripheral pulses strong  ABDOMEN: soft, nontender, no hepatosplenomegaly, no masses and bowel sounds normal  MS: no musculoskeletal defects are noted and gait is age appropriate without ataxia  SKIN: no suspicious lesions or rashes  NEURO: Normal strength and tone, sensory exam grossly normal, mentation intact and speech normal  PSYCH: mentation appears normal and affect normal/bright    Diagnostic Test Results:  No results found for this or any previous visit (from the past 24 hour(s)).    ASSESSMENT/PLAN:       ICD-10-CM    1. Encounter for routine adult medical exam with abnormal findings Z00.01    2. Anemia, unspecified type D64.9 Ferritin   3. Vitamin D deficiency E55.9 Vitamin D Deficiency   4. Screening for osteoporosis Z13.820 DX " Hip/Pelvis/Spine   5. Eye pressure H57.9 OPHTHALMOLOGY ADULT REFERRAL   6. Bone metastasis (H) C79.51    7. Metastatic breast cancer (H) C50.919      Breast cancer metastasis stable. Last CBC with decreased hemoglobin. Check for iron deficiency. Recheck vitamin D status. Check DEXA. Patient requests last minute for referral for ophthalmology.  Declines shingles vaccine.  HIV test done last year, normal. Return to clinic for any new or worsening symptoms or go to ER Urgent care in off hours      Patient Instructions   Labs today  Recheck in 1 year        Preventive Health Recommendations  Female Ages 50 - 64    Yearly exam: See your health care provider every year in order to  o Review health changes.   o Discuss preventive care.    o Review your medicines if your doctor has prescribed any.      Get a Pap test every three years (unless you have an abnormal result and your provider advises testing more often).    If you get Pap tests with HPV test, you only need to test every 5 years, unless you have an abnormal result.     You do not need a Pap test if your uterus was removed (hysterectomy) and you have not had cancer.    You should be tested each year for STDs (sexually transmitted diseases) if you're at risk.     Have a mammogram every 1 to 2 years.    Have a colonoscopy at age 50, or have a yearly FIT test (stool test). These exams screen for colon cancer.      Have a cholesterol test every 5 years, or more often if advised.    Have a diabetes test (fasting glucose) every three years. If you are at risk for diabetes, you should have this test more often.     If you are at risk for osteoporosis (brittle bone disease), think about having a bone density scan (DEXA).    Shots: Get a flu shot each year. Get a tetanus shot every 10 years.    Nutrition:     Eat at least 5 servings of fruits and vegetables each day.    Eat whole-grain bread, whole-wheat pasta and brown rice instead of white grains and rice.    Get  "adequate Calcium and Vitamin D.     Lifestyle    Exercise at least 150 minutes a week (30 minutes a day, 5 days a week). This will help you control your weight and prevent disease.    Limit alcohol to one drink per day.    No smoking.     Wear sunscreen to prevent skin cancer.     See your dentist every six months for an exam and cleaning.    See your eye doctor every 1 to 2 years.      COUNSELING:   Reviewed preventive health counseling, as reflected in patient instructions    BP Readings from Last 1 Encounters:   01/08/19 112/72     Estimated body mass index is 31.22 kg/m  as calculated from the following:    Height as of this encounter: 1.6 m (5' 2.99\").    Weight as of this encounter: 79.9 kg (176 lb 3.2 oz).     reports that  has never smoked. she has never used smokeless tobacco.      Counseling Resources:  ATP IV Guidelines  Pooled Cohorts Equation Calculator  Breast Cancer Risk Calculator  FRAX Risk Assessment  ICSI Preventive Guidelines  Dietary Guidelines for Americans, 2010  USDA's MyPlate  ASA Prophylaxis  Lung CA Screening    The information in this document, created by the medical scribe for me, accurately reflects the services I personally performed and the decisions made by me. I have reviewed and approved this document for accuracy prior to leaving the patient care area.  January 8, 2019 10:10 AM      Steffi Webb PA-C  [facility] [place]  "

## 2019-01-08 NOTE — PATIENT INSTRUCTIONS
Labs today  Recheck in 1 year        Preventive Health Recommendations  Female Ages 50 - 64    Yearly exam: See your health care provider every year in order to  o Review health changes.   o Discuss preventive care.    o Review your medicines if your doctor has prescribed any.      Get a Pap test every three years (unless you have an abnormal result and your provider advises testing more often).    If you get Pap tests with HPV test, you only need to test every 5 years, unless you have an abnormal result.     You do not need a Pap test if your uterus was removed (hysterectomy) and you have not had cancer.    You should be tested each year for STDs (sexually transmitted diseases) if you're at risk.     Have a mammogram every 1 to 2 years.    Have a colonoscopy at age 50, or have a yearly FIT test (stool test). These exams screen for colon cancer.      Have a cholesterol test every 5 years, or more often if advised.    Have a diabetes test (fasting glucose) every three years. If you are at risk for diabetes, you should have this test more often.     If you are at risk for osteoporosis (brittle bone disease), think about having a bone density scan (DEXA).    Shots: Get a flu shot each year. Get a tetanus shot every 10 years.    Nutrition:     Eat at least 5 servings of fruits and vegetables each day.    Eat whole-grain bread, whole-wheat pasta and brown rice instead of white grains and rice.    Get adequate Calcium and Vitamin D.     Lifestyle    Exercise at least 150 minutes a week (30 minutes a day, 5 days a week). This will help you control your weight and prevent disease.    Limit alcohol to one drink per day.    No smoking.     Wear sunscreen to prevent skin cancer.     See your dentist every six months for an exam and cleaning.    See your eye doctor every 1 to 2 years.

## 2019-01-14 ENCOUNTER — ANCILLARY PROCEDURE (OUTPATIENT)
Dept: CT IMAGING | Facility: CLINIC | Age: 64
End: 2019-01-14
Attending: INTERNAL MEDICINE
Payer: COMMERCIAL

## 2019-01-14 DIAGNOSIS — C50.912 MALIGNANT NEOPLASM OF LEFT FEMALE BREAST, UNSPECIFIED ESTROGEN RECEPTOR STATUS, UNSPECIFIED SITE OF BREAST (H): ICD-10-CM

## 2019-01-14 RX ORDER — IOPAMIDOL 755 MG/ML
108 INJECTION, SOLUTION INTRAVASCULAR ONCE
Status: COMPLETED | OUTPATIENT
Start: 2019-01-14 | End: 2019-01-14

## 2019-01-14 RX ORDER — HEPARIN SODIUM (PORCINE) LOCK FLUSH IV SOLN 100 UNIT/ML 100 UNIT/ML
5 SOLUTION INTRAVENOUS ONCE
Status: COMPLETED | OUTPATIENT
Start: 2019-01-14 | End: 2019-01-14

## 2019-01-14 RX ADMIN — IOPAMIDOL 108 ML: 755 INJECTION, SOLUTION INTRAVASCULAR at 14:22

## 2019-01-14 RX ADMIN — HEPARIN SODIUM (PORCINE) LOCK FLUSH IV SOLN 100 UNIT/ML 5 ML: 100 SOLUTION at 14:28

## 2019-01-14 NOTE — PROGRESS NOTES
Hoda is seen with a Tunisian . The patient is a refugee from [facility] and is here for continuation of care for her metastatic ER+HER2- breast cancer.  She is a Qu[employer] refugee from [facility] and was seen in clinic with her daughter.  The only data we have from [facility] is an English translation of her records.       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  On 02/11/2014, she was seen by an oncologist.  The clinical staging of T4b N2 M1 was noted.   Her breast cancer was on the left side. There was no right breast cancer, confirmed by the patient and her daughter, correcting a possible error in the translated records.  ER was positive in 100% of the cells, AL at 14% and HER2 was 3+ positive.  It appears that this histopathologic information is on the mastectomy specimen. She underwent an MRI for staging of presumptive bone metastases which was performed 03/02/2014.  There were skeletal metastases in the thoracic vertebrae at 12, L1, L3, L5 and S1 vertebral body, ranging in size from 0.7-3.0 cm in size.  Ischial and right femur were also involved, as well as a large iliac mass measuring about 15 cm in the uterus. 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   Radiation Oncology consultation was performed 03/11/2014, and she was given radiation in 1 dose of 6 Gy to the right iliac lesion.        With the initial diagnosis, she initiated treatment with 6 cycles of CAF neoadjuvant chemotherapy 02/14, 07/07, 03/28, 04/18/14, 05/08 and 05/29/14. She also received monthly zoledronic acid.  She then underwent a modified left mastectomy of Palacios type and left lymphadenoectomy on 06/27/2014.   Pathologic examination showed number at [facility] was [id number] showed infiltrative grade 2 ductal cancer with 6 level 1 metastatic lympFollow up with Jossie for visits and trastuzumab on 2-5, 2-26, 3-19 with CBC, CMP.  Echocardiogram on  3-19.  Follow up with me 4-9 with CBC, CMP, CA27.29 and CEA and with CT CAP on 4-8.h nodes and 3 level 2 metastatic lymph nodes.  The differentiation was intermediate.  The tumor was ER positive 70% of the cells, DC positive in 40% of the cells and HER2 was 3+ by immunohistochemistry and the Ki-67 labeling index was 20%. Her staging after surgery was stage IV, pT4b N2 M1.  I don't see a biopsy of the skeletal metastases.  She then had continuation with chemotherapy with 4 cycles of Herceptin and taxane with monthly zoledronic acid.  Tamoxifen was initiated.  She then had two years of Herceptin and a decision was made not to continue further Herceptin.  She continued on zoledronic acid every 3 months. She was clinically stable. [place] then [place] the [place] new [place] from [place].  She arrived last month, established [place] residency and now seeks further oncology care.       TREATMENT HISTORY:  A. Initial diagnosis with metastatic breast cancer in [facility].  Neoadjuvant CAF x 6.   B. Left mastectomy. Left axillary node dissection.  C.  Radiation in 1 dose to R iliac region. g Gy.  C. Herceptin for 2 years taxane for a prescribed course then stopped, monthly zoledronic acid.  She had 2 years of Herceptin with tamoxifen added after chemotherapy.  D.  Tamoxifen alone and zoledronic acid every 3 months.  E[place]  [place]  We restarted Herceptin every 3 weeks and continued tamoxifen. Bone targeted agent changed to denosumab every 9 weeks.    FOLLOWUP NOTE      HISTORY OF PRESENT ILLNESS:  Hoda returns to clinic and has been feeling quite well.  She has tolerated treatment without any difficulty.  She continues with every-3-week Herceptin and also continues with tamoxifen, which she has tolerated quite well.      REVIEW OF SYSTEMS:  A 10-point review of systems is entirely negative.  She has no pain, no fatigue, no depression, no anxiety.      Recapping family history, mother and sister both had breast cancer  but her genetics are negative for BRCA mutation.        PHYSICAL EXAMINATION:   VITAL SIGNS:  Blood pressure 120/62, pulse 75, respirations 18, temperature 97.7, O2 sat 98% on room air, weight 79.1 kg.   GENERAL:  Hoda appeared generally well.  She has no alopecia.   HEENT:  Oropharynx is without lesions.   LYMPH:  There is no palpable cervical, supraclavicular, subclavicular or axillary lymphadenopathy.   BREASTS:  Examination of right breast reveals no masses.  Examination of the left mastectomy incision is well healed without erythema or masses.  The port site is clean, dry and without erythema or tenderness.   LUNGS:  Clear to percussion and auscultation.   HEART:  Regular rate and rhythm, S1, S2.   ABDOMEN:  Soft, nontender, consistent with increased body mass index.   EXTREMITIES:  Without edema.   PSYCHIATRIC:  Mood and affect were normal.      LABORATORY DATA:  The CMP was remarkable for an albumin of 3.2 and was otherwise within normal limits.  CBC is remarkable for a slightly low hemoglobin of 11.5.  Markers are low.  Vitamin D level was 22 and she has increased her vitamin D intake to 1000 International Units daily.      IMAGING:  CT scan of the chest, abdomen and pelvis performed on 01/14/2019 showed no convincing evidence of new metastatic disease within the chest, abdomen, pelvis or bones, stable metastatic bone lesions with no hypermetabolic activity, stable sub-6 mm pulmonary nodules.  No new or enlarging pulmonary nodules and there were some incidental findings which were noted but provided no evidence of recurrence.      ASSESSMENT AND PLAN:     1.  Hoda Brush is a 63-year-old woman with a history of ER-positive, IA-positive, HER2-positive breast cancer.  She is from [facility], formally from [facility], and came [place].  She is here for continuation of every-3-week Herceptin, which she receives along with tamoxifen daily.  The tumor is ER positive, IA positive,  HER2 positive.  She had metastatic disease at the time of presentation with bone-only metastases by report.  She presented with metastatic disease in 02/2014.  Staging was initially bone-only metastases by report.  She had right hip metastasis.  She underwent neoadjuvant CAF, mastectomy, left axillary lymph node dissection and radiation.  She had radiation of the right iliac region where she had metastatic disease. She continues on tamoxifen and every-3-week trastuzumab with no evidence of disease progression.   2.  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   3.  Discussion of bone health.  She will continue with calcium and vitamin D.   4.  Followup.  We will see Hoda in followup in our clinic in 3 weeks and restaging will be performed in 12 weeks. Denosumab 2-5.  Follow up with Jossie for visits and trastuzumab on 2-5, 2-26, 3-19 with CBC, CMP.  Echocardiogram on 3-19.  Follow up with me 4-9 with CBC, CMP, CA27.29 and CEA and with CT CAP on 4-8.     Thank you for allowing us to continue to participate in Hoda Herreraclevelandjose's care.      Lisandro Aguiar MD      [facility]      I spent 30 minutes with the patient more than 50% of which was in counseling and coordination of care.

## 2019-01-14 NOTE — DISCHARGE INSTRUCTIONS

## 2019-01-15 ENCOUNTER — ONCOLOGY VISIT (OUTPATIENT)
Dept: ONCOLOGY | Facility: CLINIC | Age: 64
End: 2019-01-15
Attending: INTERNAL MEDICINE
Payer: COMMERCIAL

## 2019-01-15 VITALS
OXYGEN SATURATION: 98 % | BODY MASS INDEX: 30.88 KG/M2 | TEMPERATURE: 97.7 F | RESPIRATION RATE: 18 BRPM | WEIGHT: 174.3 LBS | DIASTOLIC BLOOD PRESSURE: 62 MMHG | SYSTOLIC BLOOD PRESSURE: 120 MMHG | HEART RATE: 75 BPM

## 2019-01-15 DIAGNOSIS — Z51.11 ENCOUNTER FOR ANTINEOPLASTIC CHEMOTHERAPY: ICD-10-CM

## 2019-01-15 DIAGNOSIS — C50.912 MALIGNANT NEOPLASM OF LEFT FEMALE BREAST, UNSPECIFIED ESTROGEN RECEPTOR STATUS, UNSPECIFIED SITE OF BREAST (H): Primary | ICD-10-CM

## 2019-01-15 LAB
ALBUMIN SERPL-MCNC: 3.2 G/DL (ref 3.4–5)
ALP SERPL-CCNC: 36 U/L (ref 40–150)
ALT SERPL W P-5'-P-CCNC: 29 U/L (ref 0–50)
ANION GAP SERPL CALCULATED.3IONS-SCNC: 5 MMOL/L (ref 3–14)
AST SERPL W P-5'-P-CCNC: 21 U/L (ref 0–45)
BASOPHILS # BLD AUTO: 0 10E9/L (ref 0–0.2)
BASOPHILS NFR BLD AUTO: 0.5 %
BILIRUB SERPL-MCNC: 0.2 MG/DL (ref 0.2–1.3)
BUN SERPL-MCNC: 13 MG/DL (ref 7–30)
CALCIUM SERPL-MCNC: 8.3 MG/DL (ref 8.5–10.1)
CANCER AG27-29 SERPL-ACNC: 9 U/ML (ref 0–39)
CEA SERPL-MCNC: 0.8 UG/L (ref 0–2.5)
CHLORIDE SERPL-SCNC: 109 MMOL/L (ref 94–109)
CO2 SERPL-SCNC: 27 MMOL/L (ref 20–32)
CREAT SERPL-MCNC: 0.78 MG/DL (ref 0.52–1.04)
DIFFERENTIAL METHOD BLD: ABNORMAL
EOSINOPHIL # BLD AUTO: 0.2 10E9/L (ref 0–0.7)
EOSINOPHIL NFR BLD AUTO: 3.4 %
ERYTHROCYTE [DISTWIDTH] IN BLOOD BY AUTOMATED COUNT: 13.3 % (ref 10–15)
GFR SERPL CREATININE-BSD FRML MDRD: 81 ML/MIN/{1.73_M2}
GLUCOSE SERPL-MCNC: 72 MG/DL (ref 70–99)
HCT VFR BLD AUTO: 36.5 % (ref 35–47)
HGB BLD-MCNC: 11.5 G/DL (ref 11.7–15.7)
IMM GRANULOCYTES # BLD: 0 10E9/L (ref 0–0.4)
IMM GRANULOCYTES NFR BLD: 0.2 %
LYMPHOCYTES # BLD AUTO: 2.7 10E9/L (ref 0.8–5.3)
LYMPHOCYTES NFR BLD AUTO: 41.7 %
MCH RBC QN AUTO: 30.1 PG (ref 26.5–33)
MCHC RBC AUTO-ENTMCNC: 31.5 G/DL (ref 31.5–36.5)
MCV RBC AUTO: 96 FL (ref 78–100)
MONOCYTES # BLD AUTO: 0.6 10E9/L (ref 0–1.3)
MONOCYTES NFR BLD AUTO: 8.9 %
NEUTROPHILS # BLD AUTO: 2.9 10E9/L (ref 1.6–8.3)
NEUTROPHILS NFR BLD AUTO: 45.3 %
NRBC # BLD AUTO: 0 10*3/UL
NRBC BLD AUTO-RTO: 0 /100
PLATELET # BLD AUTO: 222 10E9/L (ref 150–450)
POTASSIUM SERPL-SCNC: 4 MMOL/L (ref 3.4–5.3)
PROT SERPL-MCNC: 7.4 G/DL (ref 6.8–8.8)
RBC # BLD AUTO: 3.82 10E12/L (ref 3.8–5.2)
SODIUM SERPL-SCNC: 141 MMOL/L (ref 133–144)
WBC # BLD AUTO: 6.4 10E9/L (ref 4–11)

## 2019-01-15 PROCEDURE — 82378 CARCINOEMBRYONIC ANTIGEN: CPT | Performed by: INTERNAL MEDICINE

## 2019-01-15 PROCEDURE — 25000128 H RX IP 250 OP 636: Mod: ZF | Performed by: INTERNAL MEDICINE

## 2019-01-15 PROCEDURE — 86300 IMMUNOASSAY TUMOR CA 15-3: CPT | Performed by: INTERNAL MEDICINE

## 2019-01-15 PROCEDURE — 80053 COMPREHEN METABOLIC PANEL: CPT | Performed by: INTERNAL MEDICINE

## 2019-01-15 PROCEDURE — G0463 HOSPITAL OUTPT CLINIC VISIT: HCPCS | Mod: ZF

## 2019-01-15 PROCEDURE — 96413 CHEMO IV INFUSION 1 HR: CPT

## 2019-01-15 PROCEDURE — 85025 COMPLETE CBC W/AUTO DIFF WBC: CPT | Performed by: INTERNAL MEDICINE

## 2019-01-15 PROCEDURE — 99213 OFFICE O/P EST LOW 20 MIN: CPT | Mod: ZP | Performed by: INTERNAL MEDICINE

## 2019-01-15 RX ORDER — HEPARIN SODIUM (PORCINE) LOCK FLUSH IV SOLN 100 UNIT/ML 100 UNIT/ML
5 SOLUTION INTRAVENOUS EVERY 8 HOURS
Status: DISCONTINUED | OUTPATIENT
Start: 2019-01-15 | End: 2019-01-18 | Stop reason: HOSPADM

## 2019-01-15 RX ORDER — HEPARIN SODIUM (PORCINE) LOCK FLUSH IV SOLN 100 UNIT/ML 100 UNIT/ML
5 SOLUTION INTRAVENOUS EVERY 8 HOURS
Status: DISCONTINUED | OUTPATIENT
Start: 2019-01-15 | End: 2019-01-15 | Stop reason: HOSPADM

## 2019-01-15 RX ADMIN — HEPARIN 5 ML: 100 SYRINGE at 14:45

## 2019-01-15 RX ADMIN — TRASTUZUMAB 450 MG: 150 INJECTION, POWDER, LYOPHILIZED, FOR SOLUTION INTRAVENOUS at 16:07

## 2019-01-15 RX ADMIN — SODIUM CHLORIDE 250 ML: 9 INJECTION, SOLUTION INTRAVENOUS at 15:52

## 2019-01-15 RX ADMIN — HEPARIN 5 ML: 100 SYRINGE at 16:40

## 2019-01-15 ASSESSMENT — PAIN SCALES - GENERAL: PAINLEVEL: NO PAIN (0)

## 2019-01-15 NOTE — NURSING NOTE
Chief Complaint   Patient presents with     Port Draw     Right port accessed with a gripper needle, labs drawn and sent, flushed with saline and heparin, vitals completed, checked into next appointment.   Marija Teresa,RN

## 2019-01-15 NOTE — PROGRESS NOTES
Infusion Nursing Note:  Hoda KIMBLE Gonsalo presents today for Day 1 Cycle 5 Taxol    Patient seen by provider today: Yes: Dr Aguiar   present during visit today: Yes -Burundian    Note: N/A.    Intravenous Access:  Implanted Port.    Treatment Conditions:  Lab Results   Component Value Date    HGB 11.5 01/15/2019     Lab Results   Component Value Date    WBC 6.4 01/15/2019      Lab Results   Component Value Date    ANEU 2.9 01/15/2019     Lab Results   Component Value Date     01/15/2019      Lab Results   Component Value Date     01/15/2019                   Lab Results   Component Value Date    POTASSIUM 4.0 01/15/2019           No results found for: MAG         Lab Results   Component Value Date    CR 0.78 01/15/2019                   Lab Results   Component Value Date    TAYLER 8.3 01/15/2019                Lab Results   Component Value Date    BILITOTAL 0.2 01/15/2019           Lab Results   Component Value Date    ALBUMIN 3.2 01/15/2019                    Lab Results   Component Value Date    ALT 29 01/15/2019           Lab Results   Component Value Date    AST 21 01/15/2019       Results reviewed, labs MET treatment parameters, ok to proceed with treatment.      Post Infusion Assessment:  Patient tolerated infusion without incident.  Blood return noted pre and post infusion.  Site patent and intact, free from redness, edema or discomfort.  No evidence of extravasations.  Access discontinued per protocol.    Discharge Plan:   Patient declined prescription refills.  Discharge instructions reviewed with: Patient.  Patient and/or family verbalized understanding of discharge instructions and all questions answered.  Copy of AVS reviewed with patient and/or family.  No appt scheduled at this time. Next Herceptin due 2-5-19  - In basket sent to scheduling to request appt.  Pt is aware to call clinic in 3 days if scheduling does not call her.   Patient discharged in stable condition accompanied  by: self and .  Departure Mode: Ambulatory.    Rosa Littlejohn RN

## 2019-01-15 NOTE — NURSING NOTE
"Oncology Rooming Note    January 15, 2019 3:00 PM   Hoda Brush is a 63 year old female who presents for:    Chief Complaint   Patient presents with     Port Draw     Right port accessed with a gripper needle, labs drawn and sent, flushed with saline and heparin, vitals completed, checked into next appointment.     Oncology Clinic Visit     [facility] RETURN- BREAST CAAAAA     Initial Vitals: /62 (BP Location: Right arm, Patient Position: Sitting, Cuff Size: Adult Regular)   Pulse 75   Temp 97.7  F (36.5  C) (Oral)   Resp 18   Wt 79.1 kg (174 lb 4.8 oz)   SpO2 98%   BMI 30.88 kg/m   Estimated body mass index is 30.88 kg/m  as calculated from the following:    Height as of 1/8/19: 1.6 m (5' 2.99\").    Weight as of this encounter: 79.1 kg (174 lb 4.8 oz). Body surface area is 1.87 meters squared.  No Pain (0) Comment: Data Unavailable   No LMP recorded. Patient is postmenopausal.  Allergies reviewed: Yes  Medications reviewed: Yes    Medications: Medication refills not needed today.  Pharmacy name entered into [employer]: [facility] [address] [place] [address] - [phone] AT  [address] [place] & [facility]    Clinical concerns: No new concerns. Stefania was notified.    10 minutes for nursing intake (face to face time)     Vik Tenorio LPN            "

## 2019-01-15 NOTE — LETTER
1/15/2019       RE: Hoda Brush  [address] [facility] [id number]     Dear Colleague,    Thank you for referring your patient, Hoda Brush, to the [facility]. Please see a copy of my visit note below.    Hoda is seen with a Czech . The patient is a refugee from [facility] and is here for continuation of care for her metastatic ER+HER2- breast cancer.  She is a [employer] refugee from [facility] and was seen in clinic with her daughter.  The only data we have from [facility] is an English translation of her records.       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  On 02/11/2014, she was seen by an oncologist.  The clinical staging of T4b N2 M1 was noted.   Her breast cancer was on the left side. There was no right breast cancer, confirmed by the patient and her daughter, correcting a possible error in the translated records.  ER was positive in 100% of the cells, DC at 14% and HER2 was 3+ positive.  It appears that this histopathologic information is on the mastectomy specimen. She underwent an MRI for staging of presumptive bone metastases which was performed 03/02/2014.  There were skeletal metastases in the thoracic vertebrae at 12, L1, L3, L5 and S1 vertebral body, ranging in size from 0.7-3.0 cm in size.  Ischial and right femur were also involved, as well as a large iliac mass measuring about 15 cm in the uterus. 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   Radiation Oncology consultation was performed 03/11/2014, and she was given radiation in 1 dose of 6 Gy to the right iliac lesion.        With the initial diagnosis, she initiated treatment with 6 cycles of CAF neoadjuvant chemotherapy 02/14, 07/07, 03/28, 04/18/14, 05/08 and 05/29/14. She also received monthly zoledronic acid.  She then underwent a modified left mastectomy of Palacios type and left lymphadenoectomy on 06/27/2014.    Pathologic examination showed number at [facility] was [id number] showed infiltrative grade 2 ductal cancer with 6 level 1 metastatic lympFollow up with Jossie for visits and trastuzumab on 2-5, 2-26, 3-19 with CBC, CMP.  Echocardiogram on 3-19.  Follow up with me 4-9 with CBC, CMP, CA27.29 and CEA and with CT CAP on 4-8.h nodes and 3 level 2 metastatic lymph nodes.  The differentiation was intermediate.  The tumor was ER positive 70% of the cells, [place] positive in 40% of the cells and HER2 was 3+ by immunohistochemistry and the Ki-67 labeling index was 20%. Her staging after surgery was stage IV, pT4b N2 M1.  I don't see a biopsy of the skeletal metastases.  She then had continuation with chemotherapy with 4 cycles of Herceptin and taxane with monthly zoledronic acid.  Tamoxifen was initiated.  She then had two years of Herceptin and a decision was made not to continue further Herceptin.  She continued on zoledronic acid every 3 months. She was clinically stable. She then moved [place] the [place] new refugee from [facility].  She arrived last month, established [place] residency and now seeks further oncology care.       TREATMENT HISTORY:  A. Initial diagnosis with metastatic breast cancer in [facility].  Neoadjuvant CAF x 6.   B. Left mastectomy. Left axillary node dissection.  C.  Radiation in 1 dose to R iliac region. g Gy.  C. Herceptin for 2 years taxane for a prescribed course then stopped, monthly zoledronic acid.  She had 2 years of Herceptin with tamoxifen added after chemotherapy.  D.  Tamoxifen alone and zoledronic acid every 3 months.  E.  [place]  We restarted Herceptin every 3 weeks and continued tamoxifen. Bone targeted agent changed to denosumab every 9 weeks.    FOLLOWUP NOTE      HISTORY OF PRESENT ILLNESS:  Hoda returns to clinic and has been feeling quite well.  She has tolerated treatment without any difficulty.  She continues with every-3-week Herceptin and also continues with  tamoxifen, which she has tolerated quite well.      REVIEW OF SYSTEMS:  A 10-point review of systems is entirely negative.  She has no pain, no fatigue, no depression, no anxiety.      Recapping family history, mother and sister both had breast cancer but her genetics are negative for BRCA mutation.        PHYSICAL EXAMINATION:   VITAL SIGNS:  Blood pressure 120/62, pulse 75, respirations 18, temperature 97.7, O2 sat 98% on room air, weight 79.1 kg.   GENERAL:  Hoda appeared generally well.  She has no alopecia.   HEENT:  Oropharynx is without lesions.   LYMPH:  There is no palpable cervical, supraclavicular, subclavicular or axillary lymphadenopathy.   BREASTS:  Examination of right breast reveals no masses.  Examination of the left mastectomy incision is well healed without erythema or masses.  The port site is clean, dry and without erythema or tenderness.   LUNGS:  Clear to percussion and auscultation.   HEART:  Regular rate and rhythm, S1, S2.   ABDOMEN:  Soft, nontender, consistent with increased body mass index.   EXTREMITIES:  Without edema.   PSYCHIATRIC:  Mood and affect were normal.      LABORATORY DATA:  The CMP was remarkable for an albumin of 3.2 and was otherwise within normal limits.  CBC is remarkable for a slightly low hemoglobin of 11.5.  Markers are low.  Vitamin D level was 22 and she has increased her vitamin D intake to 1000 International Units daily.      IMAGING:  CT scan of the chest, abdomen and pelvis performed on 01/14/2019 showed no convincing evidence of new metastatic disease within the chest, abdomen, pelvis or bones, stable metastatic bone lesions with no hypermetabolic activity, stable sub-6 mm pulmonary nodules.  No new or enlarging pulmonary nodules and there were some incidental findings which were noted but provided no evidence of recurrence.      ASSESSMENT AND PLAN:     1.  Hoda Brush is a 63-year-old woman with a history of ER-positive, IL-positive, HER2-positive  breast cancer.  She is from [facility], formally from [facility], and came [place].  She is here for continuation of every-3-week Herceptin, which she receives along with tamoxifen daily.  The tumor is ER positive, AR positive, HER2 positive.  She had metastatic disease at the time of presentation with bone-only metastases by report.  She presented with metastatic disease in 02/2014.  Staging was initially bone-only metastases by report.  She had right hip metastasis.  She underwent neoadjuvant CAF, mastectomy, left axillary lymph node dissection and radiation.  She had radiation of the right iliac region where she had metastatic disease. She continues on tamoxifen and every-3-week trastuzumab with no evidence of disease progression.   2.  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   3.  Discussion of bone health.  She will continue with calcium and vitamin D.   4.  Followup.  We will see Hoda in followup in our clinic in 3 weeks and restaging will be performed in 12 weeks. Denosumab 2-5.  Follow up with Jossie for visits and trastuzumab on 2-5, 2-26, 3-19 with CBC, CMP.  Echocardiogram on 3-19.  Follow up with me 4-9 with CBC, CMP, CA27.29 and CEA and with CT CAP on 4-8.     Thank you for allowing us to continue to participate in Hoda Brush's care.      Lisandro Aguiar MD      [facility]      I spent 30 minutes with the patient more than 50% of which was in counseling and coordination of care.     Again, thank you for allowing me to participate in the care of your patient.      Sincerely,    Lisandro Aguiar MD

## 2019-02-05 ENCOUNTER — ONCOLOGY VISIT (OUTPATIENT)
Dept: ONCOLOGY | Facility: CLINIC | Age: 64
End: 2019-02-05
Attending: PHYSICIAN ASSISTANT
Payer: COMMERCIAL

## 2019-02-05 ENCOUNTER — INFUSION THERAPY VISIT (OUTPATIENT)
Dept: ONCOLOGY | Facility: CLINIC | Age: 64
End: 2019-02-05
Attending: INTERNAL MEDICINE
Payer: COMMERCIAL

## 2019-02-05 VITALS
TEMPERATURE: 97.6 F | OXYGEN SATURATION: 99 % | HEIGHT: 63 IN | WEIGHT: 176.9 LBS | SYSTOLIC BLOOD PRESSURE: 122 MMHG | BODY MASS INDEX: 31.34 KG/M2 | DIASTOLIC BLOOD PRESSURE: 70 MMHG | HEART RATE: 81 BPM | RESPIRATION RATE: 16 BRPM

## 2019-02-05 DIAGNOSIS — C50.912 MALIGNANT NEOPLASM OF LEFT FEMALE BREAST, UNSPECIFIED ESTROGEN RECEPTOR STATUS, UNSPECIFIED SITE OF BREAST (H): Primary | ICD-10-CM

## 2019-02-05 DIAGNOSIS — F51.01 PRIMARY INSOMNIA: ICD-10-CM

## 2019-02-05 LAB
ALBUMIN SERPL-MCNC: 3.3 G/DL (ref 3.4–5)
ALP SERPL-CCNC: 38 U/L (ref 40–150)
ALT SERPL W P-5'-P-CCNC: 25 U/L (ref 0–50)
ANION GAP SERPL CALCULATED.3IONS-SCNC: 6 MMOL/L (ref 3–14)
AST SERPL W P-5'-P-CCNC: 23 U/L (ref 0–45)
BASOPHILS # BLD AUTO: 0.1 10E9/L (ref 0–0.2)
BASOPHILS NFR BLD AUTO: 0.6 %
BILIRUB SERPL-MCNC: 0.2 MG/DL (ref 0.2–1.3)
BUN SERPL-MCNC: 16 MG/DL (ref 7–30)
CALCIUM SERPL-MCNC: 8.6 MG/DL (ref 8.5–10.1)
CANCER AG27-29 SERPL-ACNC: 9 U/ML (ref 0–39)
CEA SERPL-MCNC: 0.6 UG/L (ref 0–2.5)
CHLORIDE SERPL-SCNC: 109 MMOL/L (ref 94–109)
CO2 SERPL-SCNC: 27 MMOL/L (ref 20–32)
CREAT SERPL-MCNC: 0.8 MG/DL (ref 0.52–1.04)
DIFFERENTIAL METHOD BLD: ABNORMAL
EOSINOPHIL # BLD AUTO: 0.2 10E9/L (ref 0–0.7)
EOSINOPHIL NFR BLD AUTO: 2.9 %
ERYTHROCYTE [DISTWIDTH] IN BLOOD BY AUTOMATED COUNT: 13.3 % (ref 10–15)
GFR SERPL CREATININE-BSD FRML MDRD: 79 ML/MIN/{1.73_M2}
GLUCOSE SERPL-MCNC: 100 MG/DL (ref 70–99)
HCT VFR BLD AUTO: 37.3 % (ref 35–47)
HGB BLD-MCNC: 11.3 G/DL (ref 11.7–15.7)
IMM GRANULOCYTES # BLD: 0 10E9/L (ref 0–0.4)
IMM GRANULOCYTES NFR BLD: 0.2 %
LYMPHOCYTES # BLD AUTO: 2.8 10E9/L (ref 0.8–5.3)
LYMPHOCYTES NFR BLD AUTO: 34.3 %
MCH RBC QN AUTO: 29 PG (ref 26.5–33)
MCHC RBC AUTO-ENTMCNC: 30.3 G/DL (ref 31.5–36.5)
MCV RBC AUTO: 96 FL (ref 78–100)
MONOCYTES # BLD AUTO: 0.5 10E9/L (ref 0–1.3)
MONOCYTES NFR BLD AUTO: 6 %
NEUTROPHILS # BLD AUTO: 4.6 10E9/L (ref 1.6–8.3)
NEUTROPHILS NFR BLD AUTO: 56 %
NRBC # BLD AUTO: 0 10*3/UL
NRBC BLD AUTO-RTO: 0 /100
PLATELET # BLD AUTO: 195 10E9/L (ref 150–450)
POTASSIUM SERPL-SCNC: 4 MMOL/L (ref 3.4–5.3)
PROT SERPL-MCNC: 7.3 G/DL (ref 6.8–8.8)
RBC # BLD AUTO: 3.89 10E12/L (ref 3.8–5.2)
SODIUM SERPL-SCNC: 141 MMOL/L (ref 133–144)
WBC # BLD AUTO: 8.2 10E9/L (ref 4–11)

## 2019-02-05 PROCEDURE — 80053 COMPREHEN METABOLIC PANEL: CPT | Performed by: INTERNAL MEDICINE

## 2019-02-05 PROCEDURE — 25000128 H RX IP 250 OP 636: Mod: ZF | Performed by: INTERNAL MEDICINE

## 2019-02-05 PROCEDURE — G0463 HOSPITAL OUTPT CLINIC VISIT: HCPCS | Mod: ZF

## 2019-02-05 PROCEDURE — 85025 COMPLETE CBC W/AUTO DIFF WBC: CPT | Performed by: INTERNAL MEDICINE

## 2019-02-05 PROCEDURE — 96413 CHEMO IV INFUSION 1 HR: CPT

## 2019-02-05 PROCEDURE — 82378 CARCINOEMBRYONIC ANTIGEN: CPT | Performed by: INTERNAL MEDICINE

## 2019-02-05 PROCEDURE — T1013 SIGN LANG/ORAL INTERPRETER: HCPCS | Mod: U3,ZF

## 2019-02-05 PROCEDURE — 99214 OFFICE O/P EST MOD 30 MIN: CPT | Mod: ZP | Performed by: PHYSICIAN ASSISTANT

## 2019-02-05 PROCEDURE — T1013 SIGN LANG/ORAL INTERPRETER: HCPCS | Mod: U3

## 2019-02-05 PROCEDURE — 86300 IMMUNOASSAY TUMOR CA 15-3: CPT | Performed by: INTERNAL MEDICINE

## 2019-02-05 PROCEDURE — 25000128 H RX IP 250 OP 636: Mod: ZF | Performed by: PHYSICIAN ASSISTANT

## 2019-02-05 RX ORDER — HEPARIN SODIUM (PORCINE) LOCK FLUSH IV SOLN 100 UNIT/ML 100 UNIT/ML
5 SOLUTION INTRAVENOUS DAILY PRN
Status: DISCONTINUED | OUTPATIENT
Start: 2019-02-05 | End: 2019-02-05 | Stop reason: HOSPADM

## 2019-02-05 RX ORDER — LANOLIN ALCOHOL/MO/W.PET/CERES
3 CREAM (GRAM) TOPICAL
Qty: 30 TABLET | Refills: 11 | Status: SHIPPED | OUTPATIENT
Start: 2019-02-05 | End: 2020-11-03

## 2019-02-05 RX ORDER — HEPARIN SODIUM (PORCINE) LOCK FLUSH IV SOLN 100 UNIT/ML 100 UNIT/ML
5 SOLUTION INTRAVENOUS EVERY 8 HOURS
Status: DISCONTINUED | OUTPATIENT
Start: 2019-02-05 | End: 2019-02-05 | Stop reason: HOSPADM

## 2019-02-05 RX ADMIN — TRASTUZUMAB 450 MG: 150 INJECTION, POWDER, LYOPHILIZED, FOR SOLUTION INTRAVENOUS at 13:41

## 2019-02-05 RX ADMIN — HEPARIN SODIUM (PORCINE) LOCK FLUSH IV SOLN 100 UNIT/ML 5 ML: 100 SOLUTION at 14:11

## 2019-02-05 RX ADMIN — HEPARIN SODIUM (PORCINE) LOCK FLUSH IV SOLN 100 UNIT/ML 5 ML: 100 SOLUTION at 12:26

## 2019-02-05 ASSESSMENT — MIFFLIN-ST. JEOR: SCORE: 1331.41

## 2019-02-05 ASSESSMENT — PAIN SCALES - GENERAL: PAINLEVEL: NO PAIN (0)

## 2019-02-05 NOTE — PROGRESS NOTES
Infusion Nursing Note:  Hoda Brush presents today for Cycle 25 Herceptin.    Patient seen by provider today: Yes: SEMAJ Escobar  Interpretor present during infusion: Yes    Note: Xgeva not given today due to upcoming dental procedure.    Intravenous Access:  Implanted Port.    Treatment Conditions:  Lab Results   Component Value Date    HGB 11.3 02/05/2019     Lab Results   Component Value Date    WBC 8.2 02/05/2019      Lab Results   Component Value Date    ANEU 4.6 02/05/2019     Lab Results   Component Value Date     02/05/2019      Lab Results   Component Value Date     02/05/2019                   Lab Results   Component Value Date    POTASSIUM 4.0 02/05/2019           No results found for: MAG         Lab Results   Component Value Date    CR 0.80 02/05/2019                   Lab Results   Component Value Date    TAYLER 8.6 02/05/2019                Lab Results   Component Value Date    BILITOTAL 0.2 02/05/2019           Lab Results   Component Value Date    ALBUMIN 3.3 02/05/2019                    Lab Results   Component Value Date    ALT 25 02/05/2019           Lab Results   Component Value Date    AST 23 02/05/2019       Results reviewed, labs MET treatment parameters, ok to proceed with treatment.  ECHO/MUGA completed 12/5/18  EF 62%.    Post Infusion Assessment:  Patient tolerated infusion without incident.  Blood return noted pre and post infusion.  Site patent and intact, free from redness, edema or discomfort.  No evidence of extravasations. Access discontinued per protocol.    Discharge Plan:   Patient declined prescription refills.  Copy of AVS reviewed with patient and/or family.  Patient will return 2/26 for next appointment.  Patient discharged in stable condition accompanied by: self.  Departure Mode: Ambulatory.    Ashlyn Cano RN

## 2019-02-05 NOTE — NURSING NOTE
"Oncology Rooming Note    February 5, 2019 12:44 PM   Hoda Brush is a 63 year old female who presents for:    Chief Complaint   Patient presents with     Port Draw     Labs drawn via port by RN in lab. VS taken.      Oncology Clinic Visit     F/U Breast Ca     Initial Vitals: /70 (BP Location: Right arm, Patient Position: Sitting, Cuff Size: Adult Regular)   Pulse 81   Temp 97.6  F (36.4  C) (Oral)   Resp 16   Ht 1.6 m (5' 2.99\")   Wt 80.2 kg (176 lb 14.4 oz)   SpO2 99%   BMI 31.34 kg/m   Estimated body mass index is 31.34 kg/m  as calculated from the following:    Height as of this encounter: 1.6 m (5' 2.99\").    Weight as of this encounter: 80.2 kg (176 lb 14.4 oz). Body surface area is 1.89 meters squared.  No Pain (0) Comment: Data Unavailable   No LMP recorded. Patient is postmenopausal.  Allergies reviewed: Yes  Medications reviewed: Yes    Medications: MEDICATION REFILLS NEEDED TODAY. Provider was notified.  Pharmacy name entered into [employer]: [facility] [id number] [place] [address] [id number] AT  [address] [place] & [facility]    Clinical concerns: None, Jossie was NOT notified.    10 minutes for nursing intake (face to face time)     JAMESON ROJAS LPN            "

## 2019-02-05 NOTE — PATIENT INSTRUCTIONS
At your next dental appointment, please ask your dentist if you can continue Denosumab (Xgeva) or when it would be safe to resume.

## 2019-02-05 NOTE — LETTER
2/5/2019      RE: Hoda Brush  [address] [facility] [address]       Oncology/Hematology Visit Note  Feb 5, 2019    Reason for Visit: follow up of ER positive, HER2 positive left metastatic breast cancer    History of Present Illness: Hoda Brush is a 63 year old female with ER positive, HER2 positive left metastatic breast cancer with bone mets.     TREATMENT HISTORY:  A. Initial diagnosis with metastatic breast cancer in [facility].  Neoadjuvant CAF x 6.   B. Left mastectomy. Left axillary node dissection.  C.  Radiation in 1 dose to R iliac region. g Gy.  C. Herceptin for 2 years taxane for a prescribed course then stopped, monthly zoledronic acid.  She had 2 years of Herceptin with tamoxifen added after chemotherapy.  D.  Tamoxifen alone and zoledronic acid every 3 months.  E.  [place]  We restarted Herceptin every 3 weeks and continued tamoxifen. Bone targeted agent changed to denosumab every 6 weeks.      She is from [facility], formerly from [facility], and [place] the [place]  She lives with her daughter and is here for continuation of every 3 week Herceptin, which she receives along with tamoxifen.  Her tumor is ER positive, CT positive, HER-2 positive.  She had metastatic disease at the time of presentation with bone-only metastases by report.  She presented with metastatic disease in 02/2014.  Staging was initially bone-only metastases by report.  She did her staging in 02/2014 that showed that she had stage IV disease, T4N2M1 invasive ductal carcinoma of the left breast.  She had a right hip metastasis.  She underwent neoadjuvant CAF, left mastectomy, left axillary lymph node dissection and radiation.  She had radiation of the right iliac region where she had metastatic disease.  Pathology report from [facility] shows the tumor to be positive for ER in 75% of the cells, positive for CT in 40% of the cells, and the HER-2 was 3+ positive by immunohistochemistry.  The Ki-67 was  "20%.  She had no evidence of nonbone metastatic disease on her PET/CT scan from 08/2017.    Restaging on 1/14/19 was stable.       Interval History:  Hoda is here for follow up today.  present. She is feeling overall well. She continues to see her dentist for ongoing issues. She needs a crown replaced and some other work. She denies any pain. She has not had any recurrence of episodes of chest pain. Denies any dizziness, dyspnea, pleuritic pain. No orthopnea, leg swelling. Appetite good. Tolerating tamoxifen. No vaginal bleeding.    Review of Systems:  Patient denies any of the following except if noted above: fevers, chills, abdominal pain, nausea, vomiting, diarrhea, constipation, urinary concerns, headaches, new aches/pains.    Current Outpatient Medications   Medication Sig Dispense Refill     melatonin 3 MG tablet Take 1 tablet (3 mg) by mouth nightly as needed for sleep 30 tablet 0     tamoxifen (NOLVADEX) 20 MG tablet Take 1 tablet (20 mg) by mouth daily 90 tablet 3     VITAMIN D, CHOLECALCIFEROL, PO Take 1,000 Units by mouth daily       order for DME Equipment being ordered: 2 Mastectomy bras and 1 prosthetheses. (Patient not taking: Reported on 11/13/2018) 2 Piece 0     order for DME L UE class 2 compression sleeve and gauntlet  Night garment  Bandaging supplies (Patient not taking: Reported on 11/13/2018) 1 each 1       Physical Examination:  General: The patient is a pleasant female in no acute distress.  /70 (BP Location: Right arm, Patient Position: Sitting, Cuff Size: Adult Regular)   Pulse 81   Temp 97.6  F (36.4  C) (Oral)   Resp 16   Ht 1.6 m (5' 2.99\")   Wt 80.2 kg (176 lb 14.4 oz)   SpO2 99%   BMI 31.34 kg/m     Wt Readings from Last 10 Encounters:   02/05/19 80.2 kg (176 lb 14.4 oz)   01/15/19 79.1 kg (174 lb 4.8 oz)   01/08/19 79.9 kg (176 lb 3.2 oz)   12/24/18 80.2 kg (176 lb 12.8 oz)   12/05/18 81.8 kg (180 lb 4.8 oz)   11/13/18 82.5 kg (181 lb 12.8 oz)   10/23/18 83 " kg (182 lb 14.4 oz)   10/02/18 83.6 kg (184 lb 3.2 oz)   09/13/18 82.7 kg (182 lb 6.4 oz)   08/22/18 82.8 kg (182 lb 9.6 oz)     HEENT: EOMI, PERRL. Sclerae are anicteric. Oral mucosa is pink and moist with no lesions or thrush.   Lymph: Neck is supple with no lymphadenopathy in the cervical or supraclavicular areas.   Heart: Regular rate and rhythm.   Lungs: Clear to auscultation bilaterally.   Abdomen: Bowel sounds present, soft, nontender with no palpable hepatosplenomegaly or masses.   Extremities: No lower extremity edema noted bilaterally.   Neuro: Cranial nerves II through XII are grossly intact.  Skin: No rashes, petechiae, or bruising noted on exposed skin.    Laboratory Data:  No results found for this or any previous visit (from the past 24 hour(s)).    Assessment and Plan:  1. Metastatic breast cancer, ER, [place], HER2+ positive: Was last treated in [facility] with Herceptin. We have continued Herceptin every 3 weeks as well as daily tamoxifen. CT CAP on 10/118 with stable disease. Echocardiogram on 1/15 with EF 62% and normal LV function. Tolerating well. No further episodes of chest pain.  --Will proceed with Herceptin. Continue Tamoxifen.   --Follow up on 2/26 and 3/19with myself prior to Herceptin.  --Next echo on 3/19   --Next restaging on 4/8 prior to seeing Dr. Aguiar on 4/9    2. Bone metastasis: On xgeva every 6 weeks. On calcium + vitamin D. Calcium corrects to WNL. Last dose on 12/24. As she has dental issues, will need to hold until evaluated by dentist--visit scheduled this week.    3. Atypical chest pain: this has not recurred. She should see cardiology and have stress test if this recurs.       4. LUE lymphedema: She completed lymphedema treatment and has a sleeve to use now.      5. Insomnia: She feels melatonin is working. No longer taking remeron as was too sedating. Refill for melatonin 3mg at bedtime prn sent.     6. Health maintenance: Flu shot given this season.    Jossie Sparrow  JUSTINE  [facility]  [address], [place] [address] [phone]

## 2019-02-05 NOTE — NURSING NOTE
Chief Complaint   Patient presents with     Port Draw     Labs drawn via port by RN in lab. VS taken.      Labs drawn via Port accessed using 20g gripper needle. Line flushed and Heparin locked. Vital signs taken. Checked into next appointment.       Yaneth Walker RN

## 2019-02-05 NOTE — PROGRESS NOTES
Oncology/Hematology Visit Note  Feb 5, 2019    Reason for Visit: follow up of ER positive, HER2 positive left metastatic breast cancer    History of Present Illness: Hoda Brush is a 63 year old female with ER positive, HER2 positive left metastatic breast cancer with bone mets.     TREATMENT HISTORY:  A. Initial diagnosis with metastatic breast cancer in [facility].  Neoadjuvant CAF x 6.   B. Left mastectomy. Left axillary node dissection.  C.  Radiation in 1 dose to R iliac region. g Gy.  C. Herceptin for 2 years taxane for a prescribed course then stopped, monthly zoledronic acid.  She had 2 years of Herceptin with tamoxifen added after chemotherapy.  D.  Tamoxifen alone and zoledronic acid every 3 months.  E.  [place]  We restarted Herceptin every 3 weeks and continued tamoxifen. Bone targeted agent changed to denosumab every 6 weeks.      She is from [facility], formerly from [facility], and [place]  She lives with her daughter and is here for continuation of every 3 week Herceptin, which she receives along with tamoxifen.  Her tumor is ER positive, HI positive, HER-2 positive.  She had metastatic disease at the time of presentation with bone-only metastases by report.  She presented with metastatic disease in 02/2014.  Staging was initially bone-only metastases by report.  She did her staging in 02/2014 that showed that she had stage IV disease, T4N2M1 invasive ductal carcinoma of the left breast.  She had a right hip metastasis.  She underwent neoadjuvant CAF, left mastectomy, left axillary lymph node dissection and radiation.  She had radiation of the right iliac region where she had metastatic disease.  Pathology report from [facility] shows the tumor to be positive for ER in 75% of the cells, positive for HI in 40% of the cells, and the HER-2 was 3+ positive by immunohistochemistry.  The Ki-67 was 20%.  She had no evidence of nonbone metastatic disease on her PET/CT scan from  "08/2017.    Restaging on 1/14/19 was stable.       Interval History:  Hoda is here for follow up today.  present. She is feeling overall well. She continues to see her dentist for ongoing issues. She needs a crown replaced and some other work. She denies any pain. She has not had any recurrence of episodes of chest pain. Denies any dizziness, dyspnea, pleuritic pain. No orthopnea, leg swelling. Appetite good. Tolerating tamoxifen. No vaginal bleeding.    Review of Systems:  Patient denies any of the following except if noted above: fevers, chills, abdominal pain, nausea, vomiting, diarrhea, constipation, urinary concerns, headaches, new aches/pains.    Current Outpatient Medications   Medication Sig Dispense Refill     melatonin 3 MG tablet Take 1 tablet (3 mg) by mouth nightly as needed for sleep 30 tablet 0     tamoxifen (NOLVADEX) 20 MG tablet Take 1 tablet (20 mg) by mouth daily 90 tablet 3     VITAMIN D, CHOLECALCIFEROL, PO Take 1,000 Units by mouth daily       order for DME Equipment being ordered: 2 Mastectomy bras and 1 prosthetheses. (Patient not taking: Reported on 11/13/2018) 2 Piece 0     order for DME L UE class 2 compression sleeve and gauntlet  Night garment  Bandaging supplies (Patient not taking: Reported on 11/13/2018) 1 each 1       Physical Examination:  General: The patient is a pleasant female in no acute distress.  /70 (BP Location: Right arm, Patient Position: Sitting, Cuff Size: Adult Regular)   Pulse 81   Temp 97.6  F (36.4  C) (Oral)   Resp 16   Ht 1.6 m (5' 2.99\")   Wt 80.2 kg (176 lb 14.4 oz)   SpO2 99%   BMI 31.34 kg/m    Wt Readings from Last 10 Encounters:   02/05/19 80.2 kg (176 lb 14.4 oz)   01/15/19 79.1 kg (174 lb 4.8 oz)   01/08/19 79.9 kg (176 lb 3.2 oz)   12/24/18 80.2 kg (176 lb 12.8 oz)   12/05/18 81.8 kg (180 lb 4.8 oz)   11/13/18 82.5 kg (181 lb 12.8 oz)   10/23/18 83 kg (182 lb 14.4 oz)   10/02/18 83.6 kg (184 lb 3.2 oz)   09/13/18 82.7 kg (182 lb " 6.4 oz)   08/22/18 82.8 kg (182 lb 9.6 oz)     HEENT: EOMI, PERRL. Sclerae are anicteric. Oral mucosa is pink and moist with no lesions or thrush.   Lymph: Neck is supple with no lymphadenopathy in the cervical or supraclavicular areas.   Heart: Regular rate and rhythm.   Lungs: Clear to auscultation bilaterally.   Abdomen: Bowel sounds present, soft, nontender with no palpable hepatosplenomegaly or masses.   Extremities: No lower extremity edema noted bilaterally.   Neuro: Cranial nerves II through XII are grossly intact.  Skin: No rashes, petechiae, or bruising noted on exposed skin.    Laboratory Data:  No results found for this or any previous visit (from the past 24 hour(s)).    Assessment and Plan:    1. Metastatic breast cancer, ER, [place], HER2+ positive: Was last treated in [facility] with Herceptin. We have continued Herceptin every 3 weeks as well as daily tamoxifen. CT CAP on 10/118 with stable disease. Echocardiogram on 1/15 with EF 62% and normal LV function. Tolerating well. No further episodes of chest pain.  --Will proceed with Herceptin. Continue Tamoxifen.   --Follow up on 2/26 and 3/19with myself prior to Herceptin.  --Next echo on 3/19   --Next restaging on 4/8 prior to seeing Dr. Aguiar on 4/9    2. Bone metastasis: On xgeva every 6 weeks. On calcium + vitamin D. Calcium corrects to WNL. Last dose on 12/24. As she has dental issues, will need to hold until evaluated by dentist--visit scheduled this week.    3. Atypical chest pain: this has not recurred. She should see cardiology and have stress test if this recurs.       4. LUE lymphedema: She completed lymphedema treatment and has a sleeve to use now.      5. Insomnia: She feels melatonin is working. No longer taking remeron as was too sedating. Refill for melatonin 3mg at bedtime prn sent.     6. Health maintenance: Flu shot given this season.    Jossie Sparrow PA-C  [facility]  [address] [place]  [address]  [phone]

## 2019-02-26 ENCOUNTER — APPOINTMENT (OUTPATIENT)
Dept: LAB | Facility: CLINIC | Age: 64
End: 2019-02-26
Attending: INTERNAL MEDICINE
Payer: COMMERCIAL

## 2019-02-26 ENCOUNTER — ONCOLOGY VISIT (OUTPATIENT)
Dept: ONCOLOGY | Facility: CLINIC | Age: 64
End: 2019-02-26
Attending: PHYSICIAN ASSISTANT
Payer: COMMERCIAL

## 2019-02-26 ENCOUNTER — INFUSION THERAPY VISIT (OUTPATIENT)
Dept: ONCOLOGY | Facility: CLINIC | Age: 64
End: 2019-02-26
Attending: INTERNAL MEDICINE
Payer: COMMERCIAL

## 2019-02-26 VITALS
TEMPERATURE: 97.7 F | SYSTOLIC BLOOD PRESSURE: 114 MMHG | HEIGHT: 63 IN | RESPIRATION RATE: 18 BRPM | BODY MASS INDEX: 31.25 KG/M2 | WEIGHT: 176.4 LBS | OXYGEN SATURATION: 99 % | DIASTOLIC BLOOD PRESSURE: 57 MMHG | HEART RATE: 71 BPM

## 2019-02-26 DIAGNOSIS — C50.919 METASTATIC BREAST CANCER: Primary | ICD-10-CM

## 2019-02-26 DIAGNOSIS — C79.51 BONE METASTASIS: ICD-10-CM

## 2019-02-26 DIAGNOSIS — C50.912 MALIGNANT NEOPLASM OF LEFT FEMALE BREAST, UNSPECIFIED ESTROGEN RECEPTOR STATUS, UNSPECIFIED SITE OF BREAST (H): Primary | ICD-10-CM

## 2019-02-26 LAB
ALBUMIN SERPL-MCNC: 3.2 G/DL (ref 3.4–5)
ALP SERPL-CCNC: 36 U/L (ref 40–150)
ALT SERPL W P-5'-P-CCNC: 24 U/L (ref 0–50)
ANION GAP SERPL CALCULATED.3IONS-SCNC: 5 MMOL/L (ref 3–14)
AST SERPL W P-5'-P-CCNC: 26 U/L (ref 0–45)
BASOPHILS # BLD AUTO: 0 10E9/L (ref 0–0.2)
BASOPHILS NFR BLD AUTO: 0.4 %
BILIRUB SERPL-MCNC: 0.2 MG/DL (ref 0.2–1.3)
BUN SERPL-MCNC: 15 MG/DL (ref 7–30)
CALCIUM SERPL-MCNC: 8 MG/DL (ref 8.5–10.1)
CANCER AG27-29 SERPL-ACNC: 10 U/ML (ref 0–39)
CEA SERPL-MCNC: 0.6 UG/L (ref 0–2.5)
CHLORIDE SERPL-SCNC: 108 MMOL/L (ref 94–109)
CO2 SERPL-SCNC: 27 MMOL/L (ref 20–32)
CREAT SERPL-MCNC: 0.64 MG/DL (ref 0.52–1.04)
DIFFERENTIAL METHOD BLD: ABNORMAL
EOSINOPHIL # BLD AUTO: 0.2 10E9/L (ref 0–0.7)
EOSINOPHIL NFR BLD AUTO: 2.6 %
ERYTHROCYTE [DISTWIDTH] IN BLOOD BY AUTOMATED COUNT: 13.2 % (ref 10–15)
GFR SERPL CREATININE-BSD FRML MDRD: >90 ML/MIN/{1.73_M2}
GLUCOSE SERPL-MCNC: 78 MG/DL (ref 70–99)
HCT VFR BLD AUTO: 35.6 % (ref 35–47)
HGB BLD-MCNC: 11.3 G/DL (ref 11.7–15.7)
IMM GRANULOCYTES # BLD: 0 10E9/L (ref 0–0.4)
IMM GRANULOCYTES NFR BLD: 0.2 %
LYMPHOCYTES # BLD AUTO: 3.2 10E9/L (ref 0.8–5.3)
LYMPHOCYTES NFR BLD AUTO: 39.4 %
MCH RBC QN AUTO: 30.2 PG (ref 26.5–33)
MCHC RBC AUTO-ENTMCNC: 31.7 G/DL (ref 31.5–36.5)
MCV RBC AUTO: 95 FL (ref 78–100)
MONOCYTES # BLD AUTO: 0.5 10E9/L (ref 0–1.3)
MONOCYTES NFR BLD AUTO: 6.3 %
NEUTROPHILS # BLD AUTO: 4.1 10E9/L (ref 1.6–8.3)
NEUTROPHILS NFR BLD AUTO: 51.1 %
NRBC # BLD AUTO: 0 10*3/UL
NRBC BLD AUTO-RTO: 0 /100
PLATELET # BLD AUTO: 196 10E9/L (ref 150–450)
POTASSIUM SERPL-SCNC: 3.9 MMOL/L (ref 3.4–5.3)
PROT SERPL-MCNC: 7.2 G/DL (ref 6.8–8.8)
RBC # BLD AUTO: 3.74 10E12/L (ref 3.8–5.2)
SODIUM SERPL-SCNC: 140 MMOL/L (ref 133–144)
WBC # BLD AUTO: 8.1 10E9/L (ref 4–11)

## 2019-02-26 PROCEDURE — 25800030 ZZH RX IP 258 OP 636: Mod: ZF | Performed by: PHYSICIAN ASSISTANT

## 2019-02-26 PROCEDURE — 25000128 H RX IP 250 OP 636: Mod: ZF | Performed by: PHYSICIAN ASSISTANT

## 2019-02-26 PROCEDURE — 80053 COMPREHEN METABOLIC PANEL: CPT | Performed by: INTERNAL MEDICINE

## 2019-02-26 PROCEDURE — 82378 CARCINOEMBRYONIC ANTIGEN: CPT | Performed by: INTERNAL MEDICINE

## 2019-02-26 PROCEDURE — 85025 COMPLETE CBC W/AUTO DIFF WBC: CPT | Performed by: INTERNAL MEDICINE

## 2019-02-26 PROCEDURE — G0463 HOSPITAL OUTPT CLINIC VISIT: HCPCS | Mod: ZF

## 2019-02-26 PROCEDURE — 99214 OFFICE O/P EST MOD 30 MIN: CPT | Mod: ZP | Performed by: PHYSICIAN ASSISTANT

## 2019-02-26 PROCEDURE — 86300 IMMUNOASSAY TUMOR CA 15-3: CPT | Performed by: INTERNAL MEDICINE

## 2019-02-26 PROCEDURE — 96413 CHEMO IV INFUSION 1 HR: CPT

## 2019-02-26 RX ORDER — SODIUM CHLORIDE 9 MG/ML
1000 INJECTION, SOLUTION INTRAVENOUS CONTINUOUS PRN
Status: CANCELLED
Start: 2019-02-26

## 2019-02-26 RX ORDER — DIPHENHYDRAMINE HCL 25 MG
50 CAPSULE ORAL ONCE
Status: CANCELLED
Start: 2019-02-26

## 2019-02-26 RX ORDER — HEPARIN SODIUM (PORCINE) LOCK FLUSH IV SOLN 100 UNIT/ML 100 UNIT/ML
5 SOLUTION INTRAVENOUS EVERY 8 HOURS
Status: DISCONTINUED | OUTPATIENT
Start: 2019-02-26 | End: 2019-02-26 | Stop reason: HOSPADM

## 2019-02-26 RX ORDER — EPINEPHRINE 1 MG/ML
0.3 INJECTION, SOLUTION INTRAMUSCULAR; SUBCUTANEOUS EVERY 5 MIN PRN
Status: CANCELLED | OUTPATIENT
Start: 2019-02-26

## 2019-02-26 RX ORDER — EPINEPHRINE 0.3 MG/.3ML
0.3 INJECTION SUBCUTANEOUS EVERY 5 MIN PRN
Status: CANCELLED | OUTPATIENT
Start: 2019-02-26

## 2019-02-26 RX ORDER — LORAZEPAM 2 MG/ML
0.5 INJECTION INTRAMUSCULAR EVERY 4 HOURS PRN
Status: CANCELLED
Start: 2019-02-26

## 2019-02-26 RX ORDER — ALBUTEROL SULFATE 0.83 MG/ML
2.5 SOLUTION RESPIRATORY (INHALATION)
Status: CANCELLED | OUTPATIENT
Start: 2019-02-26

## 2019-02-26 RX ORDER — HEPARIN SODIUM (PORCINE) LOCK FLUSH IV SOLN 100 UNIT/ML 100 UNIT/ML
5 SOLUTION INTRAVENOUS EVERY 8 HOURS
Status: CANCELLED | OUTPATIENT
Start: 2019-02-26

## 2019-02-26 RX ORDER — METHYLPREDNISOLONE SODIUM SUCCINATE 125 MG/2ML
125 INJECTION, POWDER, LYOPHILIZED, FOR SOLUTION INTRAMUSCULAR; INTRAVENOUS
Status: CANCELLED
Start: 2019-02-26

## 2019-02-26 RX ORDER — HEPARIN SODIUM (PORCINE) LOCK FLUSH IV SOLN 100 UNIT/ML 100 UNIT/ML
5 SOLUTION INTRAVENOUS EVERY 8 HOURS PRN
Status: DISCONTINUED | OUTPATIENT
Start: 2019-02-26 | End: 2019-02-27 | Stop reason: HOSPADM

## 2019-02-26 RX ORDER — ALBUTEROL SULFATE 90 UG/1
1-2 AEROSOL, METERED RESPIRATORY (INHALATION)
Status: CANCELLED
Start: 2019-02-26

## 2019-02-26 RX ORDER — DIPHENHYDRAMINE HYDROCHLORIDE 50 MG/ML
50 INJECTION INTRAMUSCULAR; INTRAVENOUS
Status: CANCELLED
Start: 2019-02-26

## 2019-02-26 RX ORDER — MEPERIDINE HYDROCHLORIDE 25 MG/ML
25 INJECTION INTRAMUSCULAR; INTRAVENOUS; SUBCUTANEOUS EVERY 30 MIN PRN
Status: CANCELLED | OUTPATIENT
Start: 2019-02-26

## 2019-02-26 RX ORDER — ACETAMINOPHEN 325 MG/1
650 TABLET ORAL
Status: CANCELLED | OUTPATIENT
Start: 2019-02-26

## 2019-02-26 RX ADMIN — TRASTUZUMAB 450 MG: 150 INJECTION, POWDER, LYOPHILIZED, FOR SOLUTION INTRAVENOUS at 14:18

## 2019-02-26 RX ADMIN — HEPARIN SODIUM (PORCINE) LOCK FLUSH IV SOLN 100 UNIT/ML 5 ML: 100 SOLUTION at 12:48

## 2019-02-26 RX ADMIN — SODIUM CHLORIDE 250 ML: 9 INJECTION, SOLUTION INTRAVENOUS at 13:49

## 2019-02-26 RX ADMIN — HEPARIN 5 ML: 100 SYRINGE at 14:50

## 2019-02-26 RX ADMIN — DENOSUMAB 120 MG: 120 INJECTION SUBCUTANEOUS at 14:19

## 2019-02-26 ASSESSMENT — MIFFLIN-ST. JEOR: SCORE: 1324.15

## 2019-02-26 ASSESSMENT — PAIN SCALES - GENERAL: PAINLEVEL: NO PAIN (0)

## 2019-02-26 NOTE — PROGRESS NOTES
Infusion Nursing Note:  Hoda Brush presents today for Cycle 26 of Herceptin and Xgeva.    Patient seen by provider today: Yes: SEMAJ Escobar   present during visit today: Yes, Language: Tunisian.     Note: Patient reported continuing to take vitamin D and calcium supplements. Patient had note from Dentist that said she is cleared to receive Xgeva today.     Intravenous Access:  Implanted Port.    Treatment Conditions:  Lab Results   Component Value Date    HGB 11.3 02/26/2019     Lab Results   Component Value Date    WBC 8.1 02/26/2019      Lab Results   Component Value Date    ANEU 4.1 02/26/2019     Lab Results   Component Value Date     02/26/2019      Lab Results   Component Value Date     02/26/2019                   Lab Results   Component Value Date    POTASSIUM 3.9 02/26/2019           No results found for: MAG         Lab Results   Component Value Date    CR 0.64 02/26/2019                   Lab Results   Component Value Date    TAYLER 8.0 02/26/2019                Lab Results   Component Value Date    BILITOTAL 0.2 02/26/2019           Lab Results   Component Value Date    ALBUMIN 3.2 02/26/2019                    Lab Results   Component Value Date    ALT 24 02/26/2019           Lab Results   Component Value Date    AST 26 02/26/2019       Results reviewed, labs MET treatment parameters, ok to proceed with treatment.  ECHO/MUGA completed 12/5/18  EF 60-65%.      Post Infusion Assessment:  Patient tolerated infusion without incident.  Patient tolerated Xgeva injection into the back of her right arm.   Blood return noted pre and post infusion.  Site patent and intact, free from redness, edema or discomfort.  No evidence of extravasations.  Access discontinued per protocol.    Discharge Plan:   Patient declined prescription refills.  Discharge instructions reviewed with: Patient.  Patient and/or family verbalized understanding of discharge instructions and all questions  answered.  AVS to patient via [employer].  Patient will return 3/19/19 for next appointment.   Patient discharged in stable condition accompanied by: self.  Departure Mode: Ambulatory.    Nan Matute RN

## 2019-02-26 NOTE — PATIENT INSTRUCTIONS
Contact Numbers  [facility]: [phone]    After Hours:  [phone]  Triage: [phone]    Please call the [facility] Triage line if you experience a temperature greater than or equal to 100.5, shaking chills, have uncontrolled nausea, vomiting and/or diarrhea, dizziness, shortness of breath, chest pain, bleeding, unexplained bruising, or if you have any other new/concerning symptoms, questions or concerns.     If it is after hours, weekends, or holidays, please call the main hospital  at  [phone] and ask to speak to the Oncology doctor on call.     If you are having any concerning symptoms or wish to speak to a provider before your next infusion visit, please call your care coordinator or triage to notify them so we can adequately serve you.     If you need a refill on a narcotic prescription or other medication, please call triage before your infusion appointment.       February 2019 Sunday Monday Tuesday Wednesday Thursday Friday Saturday                            1     2       3     4     5    [facility] DRAW  12:30 PM   (15 min.)    [facility] DRAW   [facility] Lab Draw    [facility] RETURN  12:55 PM   (90 min.)   Jossie Sparrow PA   [facility]   3:00 PM   (75 min.)    ONCOLOGY INFUSION   [facility] 6     7     8     9       10     11     12     13     14     15     16       17     18  Happy Birthday!     19     20     21     22     23       24     25     26    [facility] DRAW  12:30 PM   (30 min.)    [facility] DRAW   [facility] Lab Draw    [facility] RETURN  12:55 PM   (90 min.)   Jossie Sparrow PA   [facility] 60   2:00 PM   (60 min.)    ONCOLOGY INFUSION   [facility] 27 28 March 2019 Sunday Monday Tuesday Wednesday Thursday Friday Saturday                            1     2       3     4      5     6     7     8     9       10     11     12     13     14     15     16       17     18     19    ECHO COMPLETE   2:00 PM   (60 min.)   [id number]   [facility]    [facility] DRAW   3:00 PM   (15 min.)   [facility] LAB DRAW   [facility] Lab Draw    [facility] RETURN   3:25 PM   (50 min.)   Jossie Sparrow PA   [facility] ONC INFUSION 60   4:30 PM   (60 min.)    ONCOLOGY INFUSION   [facility] 20     21     22     23       24     25     26     27     28     29     30       31                                                   Lab Results:  Recent Results (from the past 12 hour(s))   CBC with platelets differential    Collection Time: 02/26/19 12:52 PM   Result Value Ref Range    WBC 8.1 4.0 - 11.0 10e9/L    RBC Count 3.74 (L) 3.8 - 5.2 10e12/L    Hemoglobin 11.3 (L) 11.7 - 15.7 g/dL    Hematocrit 35.6 35.0 - 47.0 %    MCV 95 78 - 100 fl    MCH 30.2 26.5 - 33.0 pg    MCHC 31.7 31.5 - 36.5 g/dL    RDW 13.2 10.0 - 15.0 %    Platelet Count 196 150 - 450 10e9/L    Diff Method Automated Method     % Neutrophils 51.1 %    % Lymphocytes 39.4 %    % Monocytes 6.3 %    % Eosinophils 2.6 %    % Basophils 0.4 %    % Immature Granulocytes 0.2 %    Nucleated RBCs 0 0 /100    Absolute Neutrophil 4.1 1.6 - 8.3 10e9/L    Absolute Lymphocytes 3.2 0.8 - 5.3 10e9/L    Absolute Monocytes 0.5 0.0 - 1.3 10e9/L    Absolute Eosinophils 0.2 0.0 - 0.7 10e9/L    Absolute Basophils 0.0 0.0 - 0.2 10e9/L    Abs Immature Granulocytes 0.0 0 - 0.4 10e9/L    Absolute Nucleated RBC 0.0    Comprehensive metabolic panel    Collection Time: 02/26/19 12:52 PM   Result Value Ref Range    Sodium 140 133 - 144 mmol/L    Potassium 3.9 3.4 - 5.3 mmol/L    Chloride 108 94 - 109 mmol/L    Carbon Dioxide 27 20 - 32 mmol/L    Anion Gap 5 3 - 14 mmol/L    Glucose 78 70 - 99 mg/dL    Urea Nitrogen 15 7 - 30 mg/dL    Creatinine 0.64 0.52 - 1.04 mg/dL    GFR Estimate >90 >60 mL/min/[1.73_m2]    GFR  Estimate If Black >90 >60 mL/min/[1.73_m2]    Calcium 8.0 (L) 8.5 - 10.1 mg/dL    Bilirubin Total 0.2 0.2 - 1.3 mg/dL    Albumin 3.2 (L) 3.4 - 5.0 g/dL    Protein Total 7.2 6.8 - 8.8 g/dL    Alkaline Phosphatase 36 (L) 40 - 150 U/L    ALT 24 0 - 50 U/L    AST 26 0 - 45 U/L

## 2019-02-26 NOTE — NURSING NOTE
Chief Complaint   Patient presents with     Port Draw     Labs drawn via port by RN in lab. Line flushed and hep locked. VS taken.     Mirta Henderson RN

## 2019-02-26 NOTE — PROGRESS NOTES
Oncology/Hematology Visit Note  Feb 26, 2019    Reason for Visit: follow up of ER positive, HER2 positive left metastatic breast cancer    History of Present Illness: Hoda Brush is a 64 year old female with ER positive, HER2 positive left metastatic breast cancer with bone mets.     TREATMENT HISTORY:  A. Initial diagnosis with metastatic breast cancer in [facility].  Neoadjuvant CAF x 6.   B. Left mastectomy. Left axillary node dissection.  C.  Radiation in 1 dose to R iliac region. g Gy.  C. Herceptin for 2 years taxane for a prescribed course then stopped, monthly zoledronic acid.  She had 2 years of Herceptin with tamoxifen added after chemotherapy.  D.  Tamoxifen alone and zoledronic acid every 3 months.  E.  [place]  We restarted Herceptin every 3 weeks and continued tamoxifen. Bone targeted agent changed to denosumab every 6 weeks.      She is from [facility], formerly from [facility], [place]  She lives with her daughter and is here for continuation of every 3 week Herceptin, which she receives along with tamoxifen.  Her tumor is ER positive, MA positive, HER-2 positive.  She had metastatic disease at the time of presentation with bone-only metastases by report.  She presented with metastatic disease in 02/2014.  Staging was initially bone-only metastases by report.  She did her staging in 02/2014 that showed that she had stage IV disease, T4N2M1 invasive ductal carcinoma of the left breast.  She had a right hip metastasis.  She underwent neoadjuvant CAF, left mastectomy, left axillary lymph node dissection and radiation.  She had radiation of the right iliac region where she had metastatic disease.  Pathology report from [facility] shows the tumor to be positive for ER in 75% of the cells, positive for MA in 40% of the cells, and the HER-2 was 3+ positive by immunohistochemistry.  The Ki-67 was 20%.  She had no evidence of nonbone metastatic disease on her PET/CT scan from  08/2017.    Restaging on 1/14/19 was stable.     Interval History:  Hoda is here for follow up today.  present. She is feeling overall well. She continues to see her dentist for ongoing issues. She did bring a note from dentist stating it was ok to proceed with Xgeva. She denies any pain. She has not had any recurrence of episodes of chest pain. Denies any dizziness, dyspnea, pleuritic pain. No orthopnea, leg swelling. Appetite good. Tolerating tamoxifen. No vaginal bleeding.    Review of Systems:  Patient denies any of the following except if noted above: fevers, chills, abdominal pain, nausea, vomiting, diarrhea, constipation, urinary concerns, headaches, new aches/pains.    Current Outpatient Medications   Medication Sig Dispense Refill     melatonin 3 MG tablet Take 1 tablet (3 mg) by mouth nightly as needed for sleep 30 tablet 11     order for DME Equipment being ordered: 2 Mastectomy bras and 1 prosthetheses. (Patient not taking: Reported on 11/13/2018) 2 Piece 0     order for DME L UE class 2 compression sleeve and gauntlet  Night garment  Bandaging supplies (Patient not taking: Reported on 11/13/2018) 1 each 1     tamoxifen (NOLVADEX) 20 MG tablet Take 1 tablet (20 mg) by mouth daily 90 tablet 3     VITAMIN D, CHOLECALCIFEROL, PO Take 1,000 Units by mouth daily         Physical Examination:  General: The patient is a pleasant female in no acute distress.  There were no vitals taken for this visit.  Wt Readings from Last 10 Encounters:   02/05/19 80.2 kg (176 lb 14.4 oz)   01/15/19 79.1 kg (174 lb 4.8 oz)   01/08/19 79.9 kg (176 lb 3.2 oz)   12/24/18 80.2 kg (176 lb 12.8 oz)   12/05/18 81.8 kg (180 lb 4.8 oz)   11/13/18 82.5 kg (181 lb 12.8 oz)   10/23/18 83 kg (182 lb 14.4 oz)   10/02/18 83.6 kg (184 lb 3.2 oz)   09/13/18 82.7 kg (182 lb 6.4 oz)   08/22/18 82.8 kg (182 lb 9.6 oz)     HEENT: EOMI, PERRL. Sclerae are anicteric. Oral mucosa is pink and moist with no lesions or thrush.   Lymph: Neck  is supple with no lymphadenopathy in the cervical or supraclavicular areas.   Heart: Regular rate and rhythm.   Lungs: Clear to auscultation bilaterally.   Abdomen: Bowel sounds present, soft, nontender with no palpable hepatosplenomegaly or masses.   Extremities: No lower extremity edema noted bilaterally.   Neuro: Cranial nerves II through XII are grossly intact.  Skin: No rashes, petechiae, or bruising noted on exposed skin.    Laboratory Data:  Results for NATASHA LOUIS (MRN [id number]) as of 2/26/2019 13:46   2/26/2019 12:52   Sodium 140   Potassium 3.9   Chloride 108   Carbon Dioxide 27   Urea Nitrogen 15   Creatinine 0.64   GFR Estimate >90   GFR Estimate If Black >90   Calcium 8.0 (L)   Anion Gap 5   Albumin 3.2 (L)   Protein Total 7.2   Bilirubin Total 0.2   Alkaline Phosphatase 36 (L)   ALT 24   AST 26      2/26/2019 12:52   Glucose 78   WBC 8.1   Hemoglobin 11.3 (L)   Hematocrit 35.6   Platelet Count 196   RBC Count 3.74 (L)   MCV 95   MCH 30.2   MCHC 31.7   RDW 13.2   Diff Method Automated Method   % Neutrophils 51.1   % Lymphocytes 39.4   % Monocytes 6.3   % Eosinophils 2.6   % Basophils 0.4   % Immature Granulocytes 0.2   Nucleated RBCs 0   Absolute Neutrophil 4.1   Absolute Lymphocytes 3.2   Absolute Monocytes 0.5   Absolute Eosinophils 0.2   Absolute Basophils 0.0   Abs Immature Granulocytes 0.0   Absolute Nucleated RBC 0.0   Results for NATASHA LOUIS (MRN [id number]) as of 2/27/2019 08:05   Ref. Range 1/15/2019 14:49 2/5/2019 12:32 2/26/2019 12:52   CA 27-29 Latest Ref Range: 0 - 39 U/mL 9 9 10   Results for NATASHA LOUIS (MRN [id number]) as of 2/27/2019 08:05   Ref. Range 1/15/2019 14:49 2/5/2019 12:32 2/26/2019 12:52   CEA Latest Ref Range: 0 - 2.5 ug/L 0.8 0.6 0.6       Assessment and Plan:    1. Metastatic breast cancer, ER, [place], HER2+ positive: Was last treated in [facility] with Herceptin. We have continued Herceptin every 3 weeks as well as daily tamoxifen. CT CAP on  10/118 with stable disease. Echocardiogram on 1/15 with EF 62% and normal LV function. Tolerating well. No further episodes of chest pain. Tumor markers stable  --Will proceed with Herceptin. Continue Tamoxifen.   --Follow up on 3/19 with myself prior to Herceptin.  --Next echo on 3/19   --Next restaging on 4/8 prior to seeing Dr. Aguiar on 4/9    2. Bone metastasis: On xgeva every 6 weeks. On calcium + vitamin D. Calcium corrects to WNL. Last dose on 12/24. Cleared by dentist to proceed. Will give today    3. Atypical chest pain: this has not recurred. She should see cardiology and have stress test if this recurs.       4. LUE lymphedema: She completed lymphedema treatment and has a sleeve to use now.      5. Insomnia: She feels melatonin is working. No longer taking remeron as was too sedating. Refill for melatonin 3mg at bedtime prn sent.     6. Health maintenance: Flu shot given this season.    Jossie Sparrow PA-C  [facility]  [address] [place] [address]  [phone]

## 2019-02-26 NOTE — NURSING NOTE
"Oncology Rooming Note    February 26, 2019 1:03 PM   Hoda Brush is a 64 year old female who presents for:    Chief Complaint   Patient presents with     Port Draw     Labs drawn via port by RN in lab. Line flushed and hep locked. VS taken.     RECHECK     ONC Breast CA      Initial Vitals: /57 (BP Location: Right arm, Patient Position: Sitting, Cuff Size: Adult Regular)   Pulse 71   Temp 97.7  F (36.5  C) (Oral)   Resp 18   Ht 1.6 m (5' 2.99\")   Wt 80 kg (176 lb 6.4 oz)   SpO2 99%   BMI 31.26 kg/m   Estimated body mass index is 31.26 kg/m  as calculated from the following:    Height as of this encounter: 1.6 m (5' 2.99\").    Weight as of this encounter: 80 kg (176 lb 6.4 oz). Body surface area is 1.89 meters squared.  No Pain (0) Comment: Data Unavailable   No LMP recorded. Patient is postmenopausal.  Allergies reviewed: Yes  Medications reviewed: Yes    Medications: Medication refills not needed today.  Pharmacy name entered into [employer]: [facility] [id number] [place] [address] - [phone] AT  [address] [facility]    Clinical concerns: none        Елена Tuan, HEATHER              "

## 2019-02-26 NOTE — LETTER
2/26/2019      RE: Hoda Brush  [address] [facility] [address]       Oncology/Hematology Visit Note  Feb 26, 2019    Reason for Visit: follow up of ER positive, HER2 positive left metastatic breast cancer    History of Present Illness: Hoda Brush is a 64 year old female with ER positive, HER2 positive left metastatic breast cancer with bone mets.     TREATMENT HISTORY:  A. Initial diagnosis with metastatic breast cancer in [facility].  Neoadjuvant CAF x 6.   B. Left mastectomy. Left axillary node dissection.  C.  Radiation in 1 dose to R iliac region. g Gy.  C. Herceptin for 2 years taxane for a prescribed course then stopped, monthly zoledronic acid.  She had 2 years of Herceptin with tamoxifen added after chemotherapy.  D.  Tamoxifen alone and zoledronic acid every 3 months.  E.  [place]  We restarted Herceptin every 3 weeks and continued tamoxifen. Bone targeted agent changed to denosumab every 6 weeks.      She is from [facility], formerly from [facility], and [place]  She lives with her daughter and is here for continuation of every 3 week Herceptin, which she receives along with tamoxifen.  Her tumor is ER positive, OR positive, HER-2 positive.  She had metastatic disease at the time of presentation with bone-only metastases by report.  She presented with metastatic disease in 02/2014.  Staging was initially bone-only metastases by report.  She did her staging in 02/2014 that showed that she had stage IV disease, T4N2M1 invasive ductal carcinoma of the left breast.  She had a right hip metastasis.  She underwent neoadjuvant CAF, left mastectomy, left axillary lymph node dissection and radiation.  She had radiation of the right iliac region where she had metastatic disease.  Pathology report from [facility] shows the tumor to be positive for ER in 75% of the cells, positive for OR in 40% of the cells, and the HER-2 was 3+ positive by immunohistochemistry.  The Ki-67 was  20%.  She had no evidence of nonbone metastatic disease on her PET/CT scan from 08/2017.    Restaging on 1/14/19 was stable.     Interval History:  Hoda is here for follow up today.  present. She is feeling overall well. She continues to see her dentist for ongoing issues. She did bring a note from dentist stating it was ok to proceed with Xgeva. She denies any pain. She has not had any recurrence of episodes of chest pain. Denies any dizziness, dyspnea, pleuritic pain. No orthopnea, leg swelling. Appetite good. Tolerating tamoxifen. No vaginal bleeding.    Review of Systems:  Patient denies any of the following except if noted above: fevers, chills, abdominal pain, nausea, vomiting, diarrhea, constipation, urinary concerns, headaches, new aches/pains.    Current Outpatient Medications   Medication Sig Dispense Refill     melatonin 3 MG tablet Take 1 tablet (3 mg) by mouth nightly as needed for sleep 30 tablet 11     order for DME Equipment being ordered: 2 Mastectomy bras and 1 prosthetheses. (Patient not taking: Reported on 11/13/2018) 2 Piece 0     order for DME L UE class 2 compression sleeve and gauntlet  Night garment  Bandaging supplies (Patient not taking: Reported on 11/13/2018) 1 each 1     tamoxifen (NOLVADEX) 20 MG tablet Take 1 tablet (20 mg) by mouth daily 90 tablet 3     VITAMIN D, CHOLECALCIFEROL, PO Take 1,000 Units by mouth daily         Physical Examination:  General: The patient is a pleasant female in no acute distress.  There were no vitals taken for this visit.  Wt Readings from Last 10 Encounters:   02/05/19 80.2 kg (176 lb 14.4 oz)   01/15/19 79.1 kg (174 lb 4.8 oz)   01/08/19 79.9 kg (176 lb 3.2 oz)   12/24/18 80.2 kg (176 lb 12.8 oz)   12/05/18 81.8 kg (180 lb 4.8 oz)   11/13/18 82.5 kg (181 lb 12.8 oz)   10/23/18 83 kg (182 lb 14.4 oz)   10/02/18 83.6 kg (184 lb 3.2 oz)   09/13/18 82.7 kg (182 lb 6.4 oz)   08/22/18 82.8 kg (182 lb 9.6 oz)     HEENT: EOMI, PERRL. Sclerae are  anicteric. Oral mucosa is pink and moist with no lesions or thrush.   Lymph: Neck is supple with no lymphadenopathy in the cervical or supraclavicular areas.   Heart: Regular rate and rhythm.   Lungs: Clear to auscultation bilaterally.   Abdomen: Bowel sounds present, soft, nontender with no palpable hepatosplenomegaly or masses.   Extremities: No lower extremity edema noted bilaterally.   Neuro: Cranial nerves II through XII are grossly intact.  Skin: No rashes, petechiae, or bruising noted on exposed skin.    Laboratory Data:  Results for NATASHA LOUIS (MRN [id number]) as of 2/26/2019 13:46   2/26/2019 12:52   Sodium 140   Potassium 3.9   Chloride 108   Carbon Dioxide 27   Urea Nitrogen 15   Creatinine 0.64   GFR Estimate >90   GFR Estimate If Black >90   Calcium 8.0 (L)   Anion Gap 5   Albumin 3.2 (L)   Protein Total 7.2   Bilirubin Total 0.2   Alkaline Phosphatase 36 (L)   ALT 24   AST 26      2/26/2019 12:52   Glucose 78   WBC 8.1   Hemoglobin 11.3 (L)   Hematocrit 35.6   Platelet Count 196   RBC Count 3.74 (L)   MCV 95   MCH 30.2   MCHC 31.7   RDW 13.2   Diff Method Automated Method   % Neutrophils 51.1   % Lymphocytes 39.4   % Monocytes 6.3   % Eosinophils 2.6   % Basophils 0.4   % Immature Granulocytes 0.2   Nucleated RBCs 0   Absolute Neutrophil 4.1   Absolute Lymphocytes 3.2   Absolute Monocytes 0.5   Absolute Eosinophils 0.2   Absolute Basophils 0.0   Abs Immature Granulocytes 0.0   Absolute Nucleated RBC 0.0   Results for NATASHA LOUIS (MRN [id number]) as of 2/27/2019 08:05   Ref. Range 1/15/2019 14:49 2/5/2019 12:32 2/26/2019 12:52   CA 27-29 Latest Ref Range: 0 - 39 U/mL 9 9 10   Results for NATASHA LOUIS (MRN [id number]) as of 2/27/2019 08:05   Ref. Range 1/15/2019 14:49 2/5/2019 12:32 2/26/2019 12:52   CEA Latest Ref Range: 0 - 2.5 ug/L 0.8 0.6 0.6       Assessment and Plan:    1. Metastatic breast cancer, ER, [place], HER2+ positive: Was last treated in [facility] with Herceptin.  We have continued Herceptin every 3 weeks as well as daily tamoxifen. CT CAP on 10/118 with stable disease. Echocardiogram on 1/15 with EF 62% and normal LV function. Tolerating well. No further episodes of chest pain. Tumor markers stable  --Will proceed with Herceptin. Continue Tamoxifen.   --Follow up on 3/19 with myself prior to Herceptin.  --Next echo on 3/19   --Next restaging on 4/8 prior to seeing Dr. Aguiar on 4/9    2. Bone metastasis: On xgeva every 6 weeks. On calcium + vitamin D. Calcium corrects to WNL. Last dose on 12/24. Cleared by dentist to proceed. Will give today    3. Atypical chest pain: this has not recurred. She should see cardiology and have stress test if this recurs.       4. LUE lymphedema: She completed lymphedema treatment and has a sleeve to use now.      5. Insomnia: She feels melatonin is working. No longer taking remeron as was too sedating. Refill for melatonin 3mg at bedtime prn sent.     6. Health maintenance: Flu shot given this season.    Jossie Sparrow PA-C  [facility]  [address] [place] [address]  [phone]

## 2019-03-19 ENCOUNTER — INFUSION THERAPY VISIT (OUTPATIENT)
Dept: ONCOLOGY | Facility: CLINIC | Age: 64
End: 2019-03-19
Attending: INTERNAL MEDICINE
Payer: COMMERCIAL

## 2019-03-19 ENCOUNTER — ANCILLARY PROCEDURE (OUTPATIENT)
Dept: CARDIOLOGY | Facility: CLINIC | Age: 64
End: 2019-03-19
Attending: INTERNAL MEDICINE
Payer: COMMERCIAL

## 2019-03-19 ENCOUNTER — APPOINTMENT (OUTPATIENT)
Dept: LAB | Facility: CLINIC | Age: 64
End: 2019-03-19
Attending: INTERNAL MEDICINE
Payer: COMMERCIAL

## 2019-03-19 ENCOUNTER — ONCOLOGY VISIT (OUTPATIENT)
Dept: ONCOLOGY | Facility: CLINIC | Age: 64
End: 2019-03-19
Attending: PHYSICIAN ASSISTANT
Payer: COMMERCIAL

## 2019-03-19 VITALS
TEMPERATURE: 97.8 F | SYSTOLIC BLOOD PRESSURE: 126 MMHG | BODY MASS INDEX: 30.92 KG/M2 | HEART RATE: 74 BPM | DIASTOLIC BLOOD PRESSURE: 63 MMHG | OXYGEN SATURATION: 100 % | WEIGHT: 174.5 LBS | RESPIRATION RATE: 16 BRPM | HEIGHT: 63 IN

## 2019-03-19 DIAGNOSIS — Z51.11 ENCOUNTER FOR ANTINEOPLASTIC CHEMOTHERAPY: ICD-10-CM

## 2019-03-19 DIAGNOSIS — C50.912 MALIGNANT NEOPLASM OF LEFT FEMALE BREAST, UNSPECIFIED ESTROGEN RECEPTOR STATUS, UNSPECIFIED SITE OF BREAST (H): ICD-10-CM

## 2019-03-19 DIAGNOSIS — C50.919 METASTATIC BREAST CANCER: Primary | ICD-10-CM

## 2019-03-19 DIAGNOSIS — C50.912 MALIGNANT NEOPLASM OF LEFT FEMALE BREAST, UNSPECIFIED ESTROGEN RECEPTOR STATUS, UNSPECIFIED SITE OF BREAST (H): Primary | ICD-10-CM

## 2019-03-19 LAB
ALBUMIN SERPL-MCNC: 3.2 G/DL (ref 3.4–5)
ALP SERPL-CCNC: 37 U/L (ref 40–150)
ALT SERPL W P-5'-P-CCNC: 36 U/L (ref 0–50)
ANION GAP SERPL CALCULATED.3IONS-SCNC: 4 MMOL/L (ref 3–14)
AST SERPL W P-5'-P-CCNC: 38 U/L (ref 0–45)
BASOPHILS # BLD AUTO: 0 10E9/L (ref 0–0.2)
BASOPHILS NFR BLD AUTO: 0.2 %
BILIRUB SERPL-MCNC: 0.2 MG/DL (ref 0.2–1.3)
BUN SERPL-MCNC: 16 MG/DL (ref 7–30)
CALCIUM SERPL-MCNC: 8.9 MG/DL (ref 8.5–10.1)
CANCER AG27-29 SERPL-ACNC: 11 U/ML (ref 0–39)
CEA SERPL-MCNC: 1 UG/L (ref 0–2.5)
CHLORIDE SERPL-SCNC: 106 MMOL/L (ref 94–109)
CO2 SERPL-SCNC: 29 MMOL/L (ref 20–32)
CREAT SERPL-MCNC: 0.71 MG/DL (ref 0.52–1.04)
DIFFERENTIAL METHOD BLD: ABNORMAL
EOSINOPHIL # BLD AUTO: 0.2 10E9/L (ref 0–0.7)
EOSINOPHIL NFR BLD AUTO: 2.6 %
ERYTHROCYTE [DISTWIDTH] IN BLOOD BY AUTOMATED COUNT: 13.4 % (ref 10–15)
GFR SERPL CREATININE-BSD FRML MDRD: >90 ML/MIN/{1.73_M2}
GLUCOSE SERPL-MCNC: 77 MG/DL (ref 70–99)
HCT VFR BLD AUTO: 35.5 % (ref 35–47)
HGB BLD-MCNC: 11.2 G/DL (ref 11.7–15.7)
IMM GRANULOCYTES # BLD: 0 10E9/L (ref 0–0.4)
IMM GRANULOCYTES NFR BLD: 0.1 %
LYMPHOCYTES # BLD AUTO: 3.1 10E9/L (ref 0.8–5.3)
LYMPHOCYTES NFR BLD AUTO: 37.4 %
MCH RBC QN AUTO: 29.9 PG (ref 26.5–33)
MCHC RBC AUTO-ENTMCNC: 31.5 G/DL (ref 31.5–36.5)
MCV RBC AUTO: 95 FL (ref 78–100)
MONOCYTES # BLD AUTO: 0.6 10E9/L (ref 0–1.3)
MONOCYTES NFR BLD AUTO: 6.8 %
NEUTROPHILS # BLD AUTO: 4.3 10E9/L (ref 1.6–8.3)
NEUTROPHILS NFR BLD AUTO: 52.9 %
NRBC # BLD AUTO: 0 10*3/UL
NRBC BLD AUTO-RTO: 0 /100
PLATELET # BLD AUTO: 242 10E9/L (ref 150–450)
POTASSIUM SERPL-SCNC: 4 MMOL/L (ref 3.4–5.3)
PROT SERPL-MCNC: 7.3 G/DL (ref 6.8–8.8)
RBC # BLD AUTO: 3.75 10E12/L (ref 3.8–5.2)
SODIUM SERPL-SCNC: 140 MMOL/L (ref 133–144)
WBC # BLD AUTO: 8.2 10E9/L (ref 4–11)

## 2019-03-19 PROCEDURE — 25000128 H RX IP 250 OP 636: Mod: ZF | Performed by: PHYSICIAN ASSISTANT

## 2019-03-19 PROCEDURE — 80053 COMPREHEN METABOLIC PANEL: CPT | Performed by: INTERNAL MEDICINE

## 2019-03-19 PROCEDURE — 99214 OFFICE O/P EST MOD 30 MIN: CPT | Mod: ZP | Performed by: PHYSICIAN ASSISTANT

## 2019-03-19 PROCEDURE — 82378 CARCINOEMBRYONIC ANTIGEN: CPT | Performed by: INTERNAL MEDICINE

## 2019-03-19 PROCEDURE — 25800030 ZZH RX IP 258 OP 636: Mod: ZF | Performed by: PHYSICIAN ASSISTANT

## 2019-03-19 PROCEDURE — G0463 HOSPITAL OUTPT CLINIC VISIT: HCPCS | Mod: ZF

## 2019-03-19 PROCEDURE — 86300 IMMUNOASSAY TUMOR CA 15-3: CPT | Performed by: INTERNAL MEDICINE

## 2019-03-19 PROCEDURE — 96413 CHEMO IV INFUSION 1 HR: CPT

## 2019-03-19 PROCEDURE — 85025 COMPLETE CBC W/AUTO DIFF WBC: CPT | Performed by: INTERNAL MEDICINE

## 2019-03-19 RX ORDER — HEPARIN SODIUM (PORCINE) LOCK FLUSH IV SOLN 100 UNIT/ML 100 UNIT/ML
5 SOLUTION INTRAVENOUS EVERY 8 HOURS
Status: CANCELLED | OUTPATIENT
Start: 2019-03-19

## 2019-03-19 RX ORDER — HEPARIN SODIUM (PORCINE) LOCK FLUSH IV SOLN 100 UNIT/ML 100 UNIT/ML
5 SOLUTION INTRAVENOUS ONCE
Status: COMPLETED | OUTPATIENT
Start: 2019-03-19 | End: 2019-03-19

## 2019-03-19 RX ORDER — HEPARIN SODIUM (PORCINE) LOCK FLUSH IV SOLN 100 UNIT/ML 100 UNIT/ML
5 SOLUTION INTRAVENOUS EVERY 8 HOURS
Status: DISCONTINUED | OUTPATIENT
Start: 2019-03-19 | End: 2019-03-19 | Stop reason: HOSPADM

## 2019-03-19 RX ORDER — MEPERIDINE HYDROCHLORIDE 25 MG/ML
25 INJECTION INTRAMUSCULAR; INTRAVENOUS; SUBCUTANEOUS EVERY 30 MIN PRN
Status: CANCELLED | OUTPATIENT
Start: 2019-03-19

## 2019-03-19 RX ORDER — LORAZEPAM 2 MG/ML
0.5 INJECTION INTRAMUSCULAR EVERY 4 HOURS PRN
Status: CANCELLED
Start: 2019-03-19

## 2019-03-19 RX ORDER — ALBUTEROL SULFATE 90 UG/1
1-2 AEROSOL, METERED RESPIRATORY (INHALATION)
Status: CANCELLED
Start: 2019-03-19

## 2019-03-19 RX ORDER — EPINEPHRINE 0.3 MG/.3ML
0.3 INJECTION SUBCUTANEOUS EVERY 5 MIN PRN
Status: CANCELLED | OUTPATIENT
Start: 2019-03-19

## 2019-03-19 RX ORDER — ACETAMINOPHEN 325 MG/1
650 TABLET ORAL
Status: CANCELLED | OUTPATIENT
Start: 2019-03-19

## 2019-03-19 RX ORDER — METHYLPREDNISOLONE SODIUM SUCCINATE 125 MG/2ML
125 INJECTION, POWDER, LYOPHILIZED, FOR SOLUTION INTRAMUSCULAR; INTRAVENOUS
Status: CANCELLED
Start: 2019-03-19

## 2019-03-19 RX ORDER — EPINEPHRINE 1 MG/ML
0.3 INJECTION, SOLUTION INTRAMUSCULAR; SUBCUTANEOUS EVERY 5 MIN PRN
Status: CANCELLED | OUTPATIENT
Start: 2019-03-19

## 2019-03-19 RX ORDER — DIPHENHYDRAMINE HYDROCHLORIDE 50 MG/ML
50 INJECTION INTRAMUSCULAR; INTRAVENOUS
Status: CANCELLED
Start: 2019-03-19

## 2019-03-19 RX ORDER — SODIUM CHLORIDE 9 MG/ML
1000 INJECTION, SOLUTION INTRAVENOUS CONTINUOUS PRN
Status: CANCELLED
Start: 2019-03-19

## 2019-03-19 RX ORDER — DIPHENHYDRAMINE HCL 25 MG
50 CAPSULE ORAL ONCE
Status: CANCELLED
Start: 2019-03-19

## 2019-03-19 RX ORDER — ALBUTEROL SULFATE 0.83 MG/ML
2.5 SOLUTION RESPIRATORY (INHALATION)
Status: CANCELLED | OUTPATIENT
Start: 2019-03-19

## 2019-03-19 RX ADMIN — SODIUM CHLORIDE 250 ML: 9 INJECTION, SOLUTION INTRAVENOUS at 15:59

## 2019-03-19 RX ADMIN — HEPARIN SODIUM (PORCINE) LOCK FLUSH IV SOLN 100 UNIT/ML 5 ML: 100 SOLUTION at 14:24

## 2019-03-19 RX ADMIN — HEPARIN SODIUM (PORCINE) LOCK FLUSH IV SOLN 100 UNIT/ML 5 ML: 100 SOLUTION at 17:07

## 2019-03-19 RX ADMIN — TRASTUZUMAB 450 MG: 150 INJECTION, POWDER, LYOPHILIZED, FOR SOLUTION INTRAVENOUS at 16:33

## 2019-03-19 ASSESSMENT — MIFFLIN-ST. JEOR: SCORE: 1315.53

## 2019-03-19 ASSESSMENT — PAIN SCALES - GENERAL: PAINLEVEL: NO PAIN (0)

## 2019-03-19 NOTE — NURSING NOTE
"Oncology Rooming Note    March 19, 2019 3:05 PM   Hoda Brush is a 64 year old female who presents for:    Chief Complaint   Patient presents with     Oncology Clinic Visit     F/U Breast Ca     Initial Vitals: /63 (BP Location: Right arm, Patient Position: Sitting, Cuff Size: Adult Regular)   Pulse 74   Temp 97.8  F (36.6  C) (Oral)   Resp 16   Ht 1.6 m (5' 2.99\")   Wt 79.2 kg (174 lb 8 oz)   SpO2 100%   BMI 30.92 kg/m   Estimated body mass index is 30.92 kg/m  as calculated from the following:    Height as of this encounter: 1.6 m (5' 2.99\").    Weight as of this encounter: 79.2 kg (174 lb 8 oz). Body surface area is 1.88 meters squared.  No Pain (0) Comment: Data Unavailable   No LMP recorded. Patient is postmenopausal.  Allergies reviewed: Yes  Medications reviewed: Yes    Medications: Medication refills not needed today.  Pharmacy name entered into [employer]: [facility] [id number] [place] [address] [id number] AT  [address] [place] [address] [facility]    Clinical concerns: None, Jossie was NOT notified.      JAMESON ROJAS LPN            "

## 2019-03-19 NOTE — LETTER
3/19/2019      RE: Hoda Brush  [address] [facility] [address]       Oncology/Hematology Visit Note  Mar 19, 2019    Reason for Visit: follow up of ER positive, HER2 positive left metastatic breast cancer    History of Present Illness: Hoda Brush is a 64 year old female with ER positive, HER2 positive left metastatic breast cancer with bone mets.     TREATMENT HISTORY:  A. Initial diagnosis with metastatic breast cancer in [facility].  Neoadjuvant CAF x 6.   B. Left mastectomy. Left axillary node dissection.  C.  Radiation in 1 dose to R iliac region. g Gy.  C. Herceptin for 2 years taxane for a prescribed course then stopped, monthly zoledronic acid.  She had 2 years of Herceptin with tamoxifen added after chemotherapy.  D.  Tamoxifen alone and zoledronic acid every 3 months.  E.  [place]  We restarted Herceptin every 3 weeks and continued tamoxifen. Bone targeted agent changed to denosumab every 6 weeks.      She is from [facility], formerly from [facility], and [place]  She lives with her daughter and is here for continuation of every 3 week Herceptin, which she receives along with tamoxifen.  Her tumor is ER positive, [place] positive, HER-2 positive.  She had metastatic disease at the time of presentation with bone-only metastases by report.  She presented with metastatic disease in 02/2014.  Staging was initially bone-only metastases by report.  She did her staging in 02/2014 that showed that she had stage IV disease, T4N2M1 invasive ductal carcinoma of the left breast.  She had a right hip metastasis.  She underwent neoadjuvant CAF, left mastectomy, left axillary lymph node dissection and radiation.  She had radiation of the right iliac region where she had metastatic disease.  Pathology report from [facility] shows the tumor to be positive for ER in 75% of the cells, positive for AZ in 40% of the cells, and the HER-2 was 3+ positive by immunohistochemistry.  The Ki-67 was  "20%.  She had no evidence of nonbone metastatic disease on her PET/CT scan from 08/2017.    Restaging on 1/14/19 was stable.     Interval History:  Hoda is here for follow up today.  present. She is feeling overall well. She continues to see her dentist for ongoing issues. She denies any pain. She has not had any recurrence of episodes of chest pain. Denies any dizziness, dyspnea, pleuritic pain. No orthopnea, leg swelling. Appetite good. Tolerating tamoxifen. No vaginal bleeding.    Review of Systems:  Patient denies any of the following except if noted above: fevers, chills, abdominal pain, nausea, vomiting, diarrhea, constipation, urinary concerns, headaches, cough.    Current Outpatient Medications   Medication Sig Dispense Refill     melatonin 3 MG tablet Take 1 tablet (3 mg) by mouth nightly as needed for sleep 30 tablet 11     order for DME Equipment being ordered: 2 Mastectomy bras and 1 prosthetheses. 2 Piece 0     order for DME L UE class 2 compression sleeve and gauntlet  Night garment  Bandaging supplies 1 each 1     tamoxifen (NOLVADEX) 20 MG tablet Take 1 tablet (20 mg) by mouth daily 90 tablet 3     VITAMIN D, CHOLECALCIFEROL, PO Take 1,000 Units by mouth daily         Physical Examination:  General: The patient is a pleasant female in no acute distress.  /63 (BP Location: Right arm, Patient Position: Sitting, Cuff Size: Adult Regular)   Pulse 74   Temp 97.8  F (36.6  C) (Oral)   Resp 16   Ht 1.6 m (5' 2.99\")   Wt 79.2 kg (174 lb 8 oz)   SpO2 100%   BMI 30.92 kg/m     Wt Readings from Last 10 Encounters:   03/19/19 79.2 kg (174 lb 8 oz)   02/26/19 80 kg (176 lb 6.4 oz)   02/05/19 80.2 kg (176 lb 14.4 oz)   01/15/19 79.1 kg (174 lb 4.8 oz)   01/08/19 79.9 kg (176 lb 3.2 oz)   12/24/18 80.2 kg (176 lb 12.8 oz)   12/05/18 81.8 kg (180 lb 4.8 oz)   11/13/18 82.5 kg (181 lb 12.8 oz)   10/23/18 83 kg (182 lb 14.4 oz)   10/02/18 83.6 kg (184 lb 3.2 oz)     HEENT: RACHEL SEE. " Sclerae are anicteric. Oral mucosa is pink and moist with no lesions or thrush.   Lymph: Neck is supple with no lymphadenopathy in the cervical or supraclavicular areas.   Heart: Regular rate and rhythm.   Lungs: Clear to auscultation bilaterally.   Abdomen: Bowel sounds present, soft, nontender with no palpable hepatosplenomegaly or masses.   Extremities: No lower extremity edema noted bilaterally.   Neuro: Cranial nerves II through XII are grossly intact.  Skin: No rashes, petechiae, or bruising noted on exposed skin.    Laboratory Data:  Results for NATASHA LOUIS (MRN [id number]) as of 3/19/2019 15:50   3/19/2019 14:48   Sodium 140   Potassium 4.0   Chloride 106   Carbon Dioxide 29   Urea Nitrogen 16   Creatinine 0.71   GFR Estimate >90   GFR Estimate If Black >90   Calcium 8.9   Anion Gap 4   Albumin 3.2 (L)   Protein Total 7.3   Bilirubin Total 0.2   Alkaline Phosphatase 37 (L)   ALT 36   AST 38   Glucose 77   WBC 8.2   Hemoglobin 11.2 (L)   Hematocrit 35.5   Platelet Count 242   RBC Count 3.75 (L)   MCV 95   MCH 29.9   MCHC 31.5   RDW 13.4   Diff Method Automated Method   % Neutrophils 52.9   % Lymphocytes 37.4   % Monocytes 6.8   % Eosinophils 2.6   % Basophils 0.2   % Immature Granulocytes 0.1   Nucleated RBCs 0   Absolute Neutrophil 4.3   Absolute Lymphocytes 3.1   Absolute Monocytes 0.6   Absolute Eosinophils 0.2   Absolute Basophils 0.0   Abs Immature Granulocytes 0.0   Absolute Nucleated RBC 0.0     Results for NATASHA LOUIS (MRN [id number]) as of 3/20/2019 05:38   Ref. Range 2/5/2019 12:32 2/26/2019 12:52 3/19/2019 14:48   CA 27-29 Latest Ref Range: 0 - 39 U/mL 9 10 11   Results for NATASHA LOUIS (MRN [id number]) as of 3/20/2019 05:38   Ref. Range 2/5/2019 12:32 2/26/2019 12:52 3/19/2019 14:48   CEA Latest Ref Range: 0 - 2.5 ug/L 0.6 0.6 1.0       Imaging:    Echo 3/19/19:  Interpretation Summary  Global and regional left ventricular function is normal with an EF of  60-65%.  Left ventricular diastolic function is normal.  Global peak LV longitudinal strain is averaged at -19.8%. This is within  reported normal limits (normal <-18%).  This study was compared with the study from 12/5/18 and there is no  significant change     Assessment and Plan:    1. Metastatic breast cancer, ER, [place], HER2+ positive: Was last treated in [facility] with Herceptin. We have continued Herceptin every 3 weeks as well as daily tamoxifen. CT CAP on 10/118 with stable disease. Echocardiogram on 3/19 with EF 60-65% and normal LV function. Tolerating well. Tumor markers stable  --Will proceed with Herceptin. Continue Tamoxifen.   --Next restaging on 4/8 prior to seeing Dr. Aguiar on 4/9    2. Bone metastasis: On xgeva every 6 weeks. On calcium + vitamin D. Calcium corrects to WNL. Last dose on 12/24. Cleared by dentist to proceed. Last dose 2/26. Will be due 4/9    3. Atypical chest pain: this has not recurred. She should see cardiology and have stress test if this recurs.       4. LUE lymphedema: She completed lymphedema treatment and has a sleeve to use now.      5. Insomnia: She feels melatonin is working. No longer taking remeron as was too sedating.      6. Health maintenance: Flu shot given this season.    Jossie Sparrow PA-C  [facility]  [address], [place] [id number] [phone]      SEMAJ Arnold

## 2019-03-19 NOTE — PROGRESS NOTES
Infusion Nursing Note:  Hoda Brush presents today for Cycle 27 Day 1 Herceptin.    Patient seen by provider today: Yes: Greenlandic   present during visit today: Not Applicable.    Note: Patient is feeling well today, she denies any new complaints.    Intravenous Access:  Implanted Port.    Treatment Conditions:  Lab Results   Component Value Date    HGB 11.2 03/19/2019     Lab Results   Component Value Date    WBC 8.2 03/19/2019      Lab Results   Component Value Date    ANEU 4.3 03/19/2019     Lab Results   Component Value Date     03/19/2019      Lab Results   Component Value Date     03/19/2019                   Lab Results   Component Value Date    POTASSIUM 4.0 03/19/2019           No results found for: MAG         Lab Results   Component Value Date    CR 0.71 03/19/2019                   Lab Results   Component Value Date    TAYLER 8.9 03/19/2019                Lab Results   Component Value Date    BILITOTAL 0.2 03/19/2019           Lab Results   Component Value Date    ALBUMIN 3.2 03/19/2019                    Lab Results   Component Value Date    ALT 36 03/19/2019           Lab Results   Component Value Date    AST 38 03/19/2019       Results reviewed, labs MET treatment parameters, ok to proceed with treatment.  ECHO/MUGA completed 3/19/2019  EF 60-65%.      Post Infusion Assessment:  Patient tolerated infusion without incident.  Blood return noted pre and post infusion.  Site patent and intact, free from redness, edema or discomfort.  No evidence of extravasations.  Access discontinued per protocol.       Discharge Plan:   Patient declined prescription refills.  Discharge instructions reviewed with: Patient.  Patient and/or family verbalized understanding of discharge instructions and all questions answered.  AVS to patient via Vaughn BurtonT.  Patient will return 4/9/2019 for next MD appointment.   Patient discharged in stable condition accompanied by: self.  Departure Mode:  Ambulatory.    Kerrie Junior RN

## 2019-04-08 ENCOUNTER — ANCILLARY PROCEDURE (OUTPATIENT)
Dept: CT IMAGING | Facility: CLINIC | Age: 64
End: 2019-04-08
Attending: INTERNAL MEDICINE
Payer: COMMERCIAL

## 2019-04-08 DIAGNOSIS — C50.912 MALIGNANT NEOPLASM OF LEFT FEMALE BREAST, UNSPECIFIED ESTROGEN RECEPTOR STATUS, UNSPECIFIED SITE OF BREAST (H): ICD-10-CM

## 2019-04-08 RX ORDER — IOPAMIDOL 755 MG/ML
107 INJECTION, SOLUTION INTRAVASCULAR ONCE
Status: COMPLETED | OUTPATIENT
Start: 2019-04-08 | End: 2019-04-08

## 2019-04-08 RX ORDER — HEPARIN SODIUM (PORCINE) LOCK FLUSH IV SOLN 100 UNIT/ML 100 UNIT/ML
5 SOLUTION INTRAVENOUS ONCE
Status: COMPLETED | OUTPATIENT
Start: 2019-04-08 | End: 2019-04-08

## 2019-04-08 RX ADMIN — IOPAMIDOL 107 ML: 755 INJECTION, SOLUTION INTRAVASCULAR at 13:52

## 2019-04-08 RX ADMIN — HEPARIN SODIUM (PORCINE) LOCK FLUSH IV SOLN 100 UNIT/ML 5 ML: 100 SOLUTION at 13:58

## 2019-04-08 NOTE — PROGRESS NOTES
Hoda is seen with a Colombian . The patient is a refugee from [facility] and is here for continuation of care for her metastatic ER+HER2- breast cancer.  She is a Lutheran refugee from [facility] and was seen in clinic with her daughter.  The only data we have from [facility] is an English translation of her records.       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  On 02/11/2014, she was seen by an oncologist.  The clinical staging of T4b N2 M1 was noted.   Her breast cancer was on the left side. There was no right breast cancer, confirmed by the patient and her daughter, correcting a possible error in the translated records.  ER was positive in 100% of the cells, DC at 14% and HER2 was 3+ positive.  It appears that this histopathologic information is on the mastectomy specimen. She underwent an MRI for staging of presumptive bone metastases which was performed 03/02/2014.  There were skeletal metastases in the thoracic vertebrae at 12, L1, L3, L5 and S1 vertebral body, ranging in size from 0.7-3.0 cm in size.  Ischial and right femur were also involved, as well as a large iliac mass measuring about 15 cm in the uterus. 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   Radiation Oncology consultation was performed 03/11/2014, and she was given radiation in 1 dose of 6 Gy to the right iliac lesion.        With the initial diagnosis, she initiated treatment with 6 cycles of CAF neoadjuvant chemotherapy 02/14, 07/07, 03/28, 04/18/14, 05/08 and 05/29/14. She also received monthly zoledronic acid.  She then underwent a modified left mastectomy of Palacios type and left lymphadenoectomy on 06/27/2014.   Pathologic examination showed number at [facility] was [id number] showed infiltrative grade 2 ductal cancer with 6 level 1 metastatic lympFollow up with Jossie for visits and trastuzumab on 2-5, 2-26, 3-19 with CBC, CMP.  Echocardiogram on  3-19.  Follow up with me 4-9 with CBC, CMP, CA27.29 and CEA and with CT CAP on 4-8.h nodes and 3 level 2 metastatic lymph nodes.  The differentiation was intermediate.  The tumor was ER positive 70% of the cells, GA positive in 40% of the cells and HER2 was 3+ by immunohistochemistry and the Ki-67 labeling index was 20%. Her staging after surgery was stage IV, pT4b N2 M1.  I don't see a biopsy of the skeletal metastases.  She then had continuation with chemotherapy with 4 cycles of Herceptin and taxane with monthly zoledronic acid.  Tamoxifen was initiated.  She then had two years of Herceptin and a decision was made not to continue further Herceptin.  She continued on zoledronic acid every 3 months. She was clinically stable. She then moved to the [place] new refugee from [facility].  She arrived last month, established [place] residency and now seeks further oncology care.       TREATMENT HISTORY:  A. Initial diagnosis with metastatic breast cancer in [facility].  Neoadjuvant CAF x 6.   B. Left mastectomy. Left axillary node dissection.  C.  Radiation in 1 dose to R iliac region.  C. Herceptin for 2 years taxane for a prescribed course then stopped, monthly zoledronic acid.  She had 2 years of Herceptin with tamoxifen added after chemotherapy.  D.  Tamoxifen alone and zoledronic acid every 3 months.  E[place]  [place]  We restarted Herceptin every 3 weeks and continued tamoxifen. Bone targeted agent changed to denosumab every 9 weeks.     FOLLOWUP NOTE     CT stable.     INTERVAL HISTORY:  Hoda returns to clinic and has been doing entirely well.  She has no pain, no fatigue, no depression, no anxiety.      REVIEW OF SYSTEMS:  A 10-point review of systems is entirely negative.  She has no complaints today.      PHYSICAL EXAMINATION:   VITAL SIGNS:  Blood pressure 130/69, temperature 97.8, pulse 88, respirations 16, O2 sat 99% on room air, weight 79.2 kg.     GENERAL:  Hoda appeared generally well.  She has no  alopecia.   HEENT:  Oropharynx is without lesions.   LYMPH:  There is no cervical, supraclavicular, subclavicular or axillary lymphadenopathy.   BREASTS:  Exam was not performed today.   LUNGS:  Clear to percussion and auscultation.   HEART:  Regular rate and rhythm, S1, S2.   ABDOMEN:  Soft and nontender, without hepatosplenomegaly.   EXTREMITIES:  Without edema.   PSYCHIATRIC:  Mood and affect were normal.      LABORATORY DATA:  The CBC and CMP were within normal limits.      IMAGING:  CT scan of the chest, abdomen and pelvis 04/08/2019 showed no evidence of new metastatic disease in the chest, abdomen or pelvis.  Persistent metastatic bone lesions having not demonstrated significant FDG uptake on the 12/21/2017 PET/CT consistent with treated disease.  Stable pulmonary nodules, left mastectomy and left axillary post-treatment changes, unchanged.  A 10 x 8 mm indeterminate left chest wall nodular density since the PET/CT scan of 12/21/2017 having no FDG activity.      ASSESSMENT AND PLAN:    1. Hoda is a 64-year-old woman with a history of ER-positive, AL-positive, HER2-positive breast cancer.  She is from [facility], formally from [facility] and [place].  She is here for continuation of every 3-week Herceptin which she receives along with tamoxifen daily.  The tumor is ER-positive, AL-positive, HER2-positive.  She had metastatic disease at the time of presentation with bone-only metastases by report.  She presented with metastatic disease in 02/2014.  Staging was initially bone-only metastases.  She had a right hip metastasis.  She underwent neoadjuvant CAF mastectomy and left axillary lymph node dissection and radiation.  She had radiation to the right iliac region where she had metastatic disease.  She continues on tamoxifen and every 3-week trastuzumab, and has had no evidence of disease progression over the past 2 years.   2.  Insomnia, responsive to mirtazapine.   3.  Discussion of  bone health.  Xgeva every 84 days, or every 4th trastuzumab treatment.  She will continue with calcium and vitamin D.   4.  Followup.  We will see Hoda in followup in our clinic in 3 weeks.  We will perform restaging in July.  Echocardiogram 03/19/2019 showed a normal ejection fraction. Follow up with trastuzumab and visit with Jossie on 4-30, 5-21.  Follow up with me and trastuzumab on 6-11. Follow up with Jossie on trastuzumab on 7-2.  Follow up with me 7-23 with trastuzumab and CT CAP on 7-22.  CBC, CMP, CA27.29 and CEA all visits.     Thank you for allowing us to continue to participate in Hoda's care.      Lisandro Aguiar MD      [facility]         I spent 25 minutes with the patient more than 50% of which was in counseling and coordination of care.

## 2019-04-09 ENCOUNTER — INFUSION THERAPY VISIT (OUTPATIENT)
Dept: ONCOLOGY | Facility: CLINIC | Age: 64
End: 2019-04-09
Attending: INTERNAL MEDICINE
Payer: COMMERCIAL

## 2019-04-09 ENCOUNTER — APPOINTMENT (OUTPATIENT)
Dept: LAB | Facility: CLINIC | Age: 64
End: 2019-04-09
Attending: INTERNAL MEDICINE
Payer: COMMERCIAL

## 2019-04-09 VITALS
DIASTOLIC BLOOD PRESSURE: 69 MMHG | WEIGHT: 174.6 LBS | RESPIRATION RATE: 16 BRPM | BODY MASS INDEX: 30.94 KG/M2 | TEMPERATURE: 97.8 F | OXYGEN SATURATION: 99 % | SYSTOLIC BLOOD PRESSURE: 130 MMHG | HEART RATE: 88 BPM

## 2019-04-09 DIAGNOSIS — C79.51 BONE METASTASIS: ICD-10-CM

## 2019-04-09 DIAGNOSIS — C50.112 CANCER OF CENTRAL PORTION OF LEFT BREAST (H): ICD-10-CM

## 2019-04-09 DIAGNOSIS — C50.912 MALIGNANT NEOPLASM OF LEFT FEMALE BREAST, UNSPECIFIED ESTROGEN RECEPTOR STATUS, UNSPECIFIED SITE OF BREAST (H): Primary | ICD-10-CM

## 2019-04-09 LAB
ALBUMIN SERPL-MCNC: 3.2 G/DL (ref 3.4–5)
ALP SERPL-CCNC: 41 U/L (ref 40–150)
ALT SERPL W P-5'-P-CCNC: 27 U/L (ref 0–50)
ANION GAP SERPL CALCULATED.3IONS-SCNC: 5 MMOL/L (ref 3–14)
AST SERPL W P-5'-P-CCNC: 23 U/L (ref 0–45)
BASOPHILS # BLD AUTO: 0 10E9/L (ref 0–0.2)
BASOPHILS NFR BLD AUTO: 0.4 %
BILIRUB SERPL-MCNC: 0.1 MG/DL (ref 0.2–1.3)
BUN SERPL-MCNC: 18 MG/DL (ref 7–30)
CALCIUM SERPL-MCNC: 9.2 MG/DL (ref 8.5–10.1)
CANCER AG27-29 SERPL-ACNC: 12 U/ML (ref 0–39)
CEA SERPL-MCNC: 0.9 UG/L (ref 0–2.5)
CHLORIDE SERPL-SCNC: 108 MMOL/L (ref 94–109)
CO2 SERPL-SCNC: 28 MMOL/L (ref 20–32)
CREAT SERPL-MCNC: 0.73 MG/DL (ref 0.52–1.04)
DIFFERENTIAL METHOD BLD: ABNORMAL
EOSINOPHIL # BLD AUTO: 0.3 10E9/L (ref 0–0.7)
EOSINOPHIL NFR BLD AUTO: 3.8 %
ERYTHROCYTE [DISTWIDTH] IN BLOOD BY AUTOMATED COUNT: 13.3 % (ref 10–15)
GFR SERPL CREATININE-BSD FRML MDRD: 87 ML/MIN/{1.73_M2}
GLUCOSE SERPL-MCNC: 94 MG/DL (ref 70–99)
HCT VFR BLD AUTO: 37.9 % (ref 35–47)
HGB BLD-MCNC: 11.8 G/DL (ref 11.7–15.7)
IMM GRANULOCYTES # BLD: 0 10E9/L (ref 0–0.4)
IMM GRANULOCYTES NFR BLD: 0.1 %
LYMPHOCYTES # BLD AUTO: 2.8 10E9/L (ref 0.8–5.3)
LYMPHOCYTES NFR BLD AUTO: 36.3 %
MCH RBC QN AUTO: 30 PG (ref 26.5–33)
MCHC RBC AUTO-ENTMCNC: 31.1 G/DL (ref 31.5–36.5)
MCV RBC AUTO: 96 FL (ref 78–100)
MONOCYTES # BLD AUTO: 0.6 10E9/L (ref 0–1.3)
MONOCYTES NFR BLD AUTO: 7.4 %
NEUTROPHILS # BLD AUTO: 3.9 10E9/L (ref 1.6–8.3)
NEUTROPHILS NFR BLD AUTO: 52 %
NRBC # BLD AUTO: 0 10*3/UL
NRBC BLD AUTO-RTO: 0 /100
PLATELET # BLD AUTO: 221 10E9/L (ref 150–450)
POTASSIUM SERPL-SCNC: 4 MMOL/L (ref 3.4–5.3)
PROT SERPL-MCNC: 7.6 G/DL (ref 6.8–8.8)
RBC # BLD AUTO: 3.93 10E12/L (ref 3.8–5.2)
SODIUM SERPL-SCNC: 140 MMOL/L (ref 133–144)
WBC # BLD AUTO: 7.6 10E9/L (ref 4–11)

## 2019-04-09 PROCEDURE — 86300 IMMUNOASSAY TUMOR CA 15-3: CPT | Performed by: INTERNAL MEDICINE

## 2019-04-09 PROCEDURE — G0463 HOSPITAL OUTPT CLINIC VISIT: HCPCS | Mod: ZF

## 2019-04-09 PROCEDURE — 85025 COMPLETE CBC W/AUTO DIFF WBC: CPT | Performed by: INTERNAL MEDICINE

## 2019-04-09 PROCEDURE — 96372 THER/PROPH/DIAG INJ SC/IM: CPT | Mod: 59

## 2019-04-09 PROCEDURE — 25000128 H RX IP 250 OP 636: Mod: ZF | Performed by: INTERNAL MEDICINE

## 2019-04-09 PROCEDURE — 82378 CARCINOEMBRYONIC ANTIGEN: CPT | Performed by: INTERNAL MEDICINE

## 2019-04-09 PROCEDURE — 80053 COMPREHEN METABOLIC PANEL: CPT | Performed by: INTERNAL MEDICINE

## 2019-04-09 PROCEDURE — 25800030 ZZH RX IP 258 OP 636: Mod: ZF | Performed by: INTERNAL MEDICINE

## 2019-04-09 PROCEDURE — 99214 OFFICE O/P EST MOD 30 MIN: CPT | Mod: ZP | Performed by: INTERNAL MEDICINE

## 2019-04-09 PROCEDURE — 96413 CHEMO IV INFUSION 1 HR: CPT

## 2019-04-09 RX ORDER — DIPHENHYDRAMINE HCL 25 MG
50 CAPSULE ORAL ONCE
Status: CANCELLED
Start: 2019-04-09

## 2019-04-09 RX ORDER — ALBUTEROL SULFATE 90 UG/1
1-2 AEROSOL, METERED RESPIRATORY (INHALATION)
Status: CANCELLED
Start: 2019-04-09

## 2019-04-09 RX ORDER — METHYLPREDNISOLONE SODIUM SUCCINATE 125 MG/2ML
125 INJECTION, POWDER, LYOPHILIZED, FOR SOLUTION INTRAMUSCULAR; INTRAVENOUS
Status: CANCELLED
Start: 2019-04-09

## 2019-04-09 RX ORDER — EPINEPHRINE 1 MG/ML
0.3 INJECTION, SOLUTION INTRAMUSCULAR; SUBCUTANEOUS EVERY 5 MIN PRN
Status: CANCELLED | OUTPATIENT
Start: 2019-04-09

## 2019-04-09 RX ORDER — HEPARIN SODIUM (PORCINE) LOCK FLUSH IV SOLN 100 UNIT/ML 100 UNIT/ML
5 SOLUTION INTRAVENOUS DAILY PRN
Status: DISCONTINUED | OUTPATIENT
Start: 2019-04-09 | End: 2019-04-14 | Stop reason: HOSPADM

## 2019-04-09 RX ORDER — DIPHENHYDRAMINE HYDROCHLORIDE 50 MG/ML
50 INJECTION INTRAMUSCULAR; INTRAVENOUS
Status: CANCELLED
Start: 2019-04-09

## 2019-04-09 RX ORDER — EPINEPHRINE 0.3 MG/.3ML
0.3 INJECTION SUBCUTANEOUS EVERY 5 MIN PRN
Status: CANCELLED | OUTPATIENT
Start: 2019-04-09

## 2019-04-09 RX ORDER — SODIUM CHLORIDE 9 MG/ML
1000 INJECTION, SOLUTION INTRAVENOUS CONTINUOUS PRN
Status: CANCELLED
Start: 2019-04-09

## 2019-04-09 RX ORDER — ACETAMINOPHEN 325 MG/1
650 TABLET ORAL
Status: CANCELLED | OUTPATIENT
Start: 2019-04-09

## 2019-04-09 RX ORDER — HEPARIN SODIUM (PORCINE) LOCK FLUSH IV SOLN 100 UNIT/ML 100 UNIT/ML
5 SOLUTION INTRAVENOUS EVERY 8 HOURS
Status: CANCELLED | OUTPATIENT
Start: 2019-04-09

## 2019-04-09 RX ORDER — MEPERIDINE HYDROCHLORIDE 25 MG/ML
25 INJECTION INTRAMUSCULAR; INTRAVENOUS; SUBCUTANEOUS EVERY 30 MIN PRN
Status: CANCELLED | OUTPATIENT
Start: 2019-04-09

## 2019-04-09 RX ORDER — ALBUTEROL SULFATE 0.83 MG/ML
2.5 SOLUTION RESPIRATORY (INHALATION)
Status: CANCELLED | OUTPATIENT
Start: 2019-04-09

## 2019-04-09 RX ORDER — HEPARIN SODIUM (PORCINE) LOCK FLUSH IV SOLN 100 UNIT/ML 100 UNIT/ML
5 SOLUTION INTRAVENOUS EVERY 8 HOURS
Status: DISCONTINUED | OUTPATIENT
Start: 2019-04-09 | End: 2019-04-09 | Stop reason: HOSPADM

## 2019-04-09 RX ORDER — LORAZEPAM 2 MG/ML
0.5 INJECTION INTRAMUSCULAR EVERY 4 HOURS PRN
Status: CANCELLED
Start: 2019-04-09

## 2019-04-09 RX ADMIN — HEPARIN SODIUM (PORCINE) LOCK FLUSH IV SOLN 100 UNIT/ML 5 ML: 100 SOLUTION at 17:05

## 2019-04-09 RX ADMIN — DENOSUMAB 120 MG: 120 INJECTION SUBCUTANEOUS at 16:36

## 2019-04-09 RX ADMIN — HEPARIN SODIUM (PORCINE) LOCK FLUSH IV SOLN 100 UNIT/ML 5 ML: 100 SOLUTION at 14:27

## 2019-04-09 RX ADMIN — TRASTUZUMAB 450 MG: 150 INJECTION, POWDER, LYOPHILIZED, FOR SOLUTION INTRAVENOUS at 16:34

## 2019-04-09 ASSESSMENT — PAIN SCALES - GENERAL: PAINLEVEL: NO PAIN (0)

## 2019-04-09 NOTE — PATIENT INSTRUCTIONS
Contact Numbers    [facility] Main Line: [phone]  [facility] Triage and after hours / weekends / holidays:  [phone]      Please call the triage or after hours line if you experience a temperature greater than or equal to 100.5, shaking chills, have uncontrolled nausea, vomiting and/or diarrhea, dizziness, shortness of breath, chest pain, bleeding, unexplained bruising, or if you have any other new/concerning symptoms, questions or concerns.      If you are having any concerning symptoms or wish to speak to a provider before your next infusion visit, please call your care coordinator or triage to notify them so we can adequately serve you.     If you need a refill on a narcotic prescription or other medication, please call before your infusion appointment.     Recent Results (from the past 24 hour(s))   CBC with platelets differential    Collection Time: 04/09/19  2:32 PM   Result Value Ref Range    WBC 7.6 4.0 - 11.0 10e9/L    RBC Count 3.93 3.8 - 5.2 10e12/L    Hemoglobin 11.8 11.7 - 15.7 g/dL    Hematocrit 37.9 35.0 - 47.0 %    MCV 96 78 - 100 fl    MCH 30.0 26.5 - 33.0 pg    MCHC 31.1 (L) 31.5 - 36.5 g/dL    RDW 13.3 10.0 - 15.0 %    Platelet Count 221 150 - 450 10e9/L    Diff Method Automated Method     % Neutrophils 52.0 %    % Lymphocytes 36.3 %    % Monocytes 7.4 %    % Eosinophils 3.8 %    % Basophils 0.4 %    % Immature Granulocytes 0.1 %    Nucleated RBCs 0 0 /100    Absolute Neutrophil 3.9 1.6 - 8.3 10e9/L    Absolute Lymphocytes 2.8 0.8 - 5.3 10e9/L    Absolute Monocytes 0.6 0.0 - 1.3 10e9/L    Absolute Eosinophils 0.3 0.0 - 0.7 10e9/L    Absolute Basophils 0.0 0.0 - 0.2 10e9/L    Abs Immature Granulocytes 0.0 0 - 0.4 10e9/L    Absolute Nucleated RBC 0.0    Comprehensive metabolic panel    Collection Time: 04/09/19  2:32 PM   Result Value Ref Range    Sodium 140 133 - 144 mmol/L    Potassium 4.0 3.4 - 5.3 mmol/L    Chloride 108 94 - 109 mmol/L    Carbon Dioxide 28 20 - 32 mmol/L    Anion Gap 5 3 - 14 mmol/L     Glucose 94 70 - 99 mg/dL    Urea Nitrogen 18 7 - 30 mg/dL    Creatinine 0.73 0.52 - 1.04 mg/dL    GFR Estimate 87 >60 mL/min/[1.73_m2]    GFR Estimate If Black >90 >60 mL/min/[1.73_m2]    Calcium 9.2 8.5 - 10.1 mg/dL    Bilirubin Total 0.1 (L) 0.2 - 1.3 mg/dL    Albumin 3.2 (L) 3.4 - 5.0 g/dL    Protein Total 7.6 6.8 - 8.8 g/dL    Alkaline Phosphatase 41 40 - 150 U/L    ALT 27 0 - 50 U/L    AST 23 0 - 45 U/L   CA 27.29 Breast tumor marker    Collection Time: 04/09/19  2:32 PM   Result Value Ref Range    CA 27-29 12 0 - 39 U/mL   CEA    Collection Time: 04/09/19  2:32 PM   Result Value Ref Range    CEA 0.9 0 - 2.5 ug/L

## 2019-04-09 NOTE — LETTER
4/9/2019       RE: Hoda Brush  [address] [address]     Dear Colleague,    Thank you for referring your patient, Hoda Brush, to the [facility]. Please see a copy of my visit note below.    Hoda is seen with a Sao Tomean . The patient is a refugee from [facility] and is here for continuation of care for her metastatic ER+HER2- breast cancer.  She is a Anabaptism refugee from [facility] and was seen in clinic with her daughter.  The only data we have from [facility] is an English translation of her records.       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  On 02/11/2014, she was seen by an oncologist.  The clinical staging of T4b N2 M1 was noted.   Her breast cancer was on the left side. There was no right breast cancer, confirmed by the patient and her daughter, correcting a possible error in the translated records.  ER was positive in 100% of the cells, GA at 14% and HER2 was 3+ positive.  It appears that this histopathologic information is on the mastectomy specimen. She underwent an MRI for staging of presumptive bone metastases which was performed 03/02/2014.  There were skeletal metastases in the thoracic vertebrae at 12, L1, L3, L5 and S1 vertebral body, ranging in size from 0.7-3.0 cm in size.  Ischial and right femur were also involved, as well as a large iliac mass measuring about 15 cm in the uterus. 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   Radiation Oncology consultation was performed 03/11/2014, and she was given radiation in 1 dose of 6 Gy to the right iliac lesion.        With the initial diagnosis, she initiated treatment with 6 cycles of CAF neoadjuvant chemotherapy 02/14, 07/07, 03/28, 04/18/14, 05/08 and 05/29/14. She also received monthly zoledronic acid.  She then underwent a modified left mastectomy of Palacios type and left lymphadenoectomy on 06/27/2014.    Pathologic examination showed number at [facility] was [id number] showed infiltrative grade 2 ductal cancer with 6 level 1 metastatic lympFollow up with Jossie for visits and trastuzumab on 2-5, 2-26, 3-19 with CBC, CMP.  Echocardiogram on 3-19.  Follow up with me 4-9 with CBC, CMP, CA27.29 and CEA and with CT CAP on 4-8.h nodes and 3 level 2 metastatic lymph nodes.  The differentiation was intermediate.  The tumor was ER positive 70% of the cells, HI positive in 40% of the cells and HER2 was 3+ by immunohistochemistry and the Ki-67 labeling index was 20%. Her staging after surgery was stage IV, pT4b N2 M1.  I don't see a biopsy of the skeletal metastases.  She then had continuation with chemotherapy with 4 cycles of Herceptin and taxane with monthly zoledronic acid.  Tamoxifen was initiated.  She then had two years of Herceptin and a decision was made not to continue further Herceptin.  She continued on zoledronic acid every 3 months. She was clinically stable. She then moved [place] the [place] new refugee from [facility].  She arrived last month, established [place] residency and now seeks further oncology care.       TREATMENT HISTORY:  A. Initial diagnosis with metastatic breast cancer in [facility].  Neoadjuvant CAF x 6.   B. Left mastectomy. Left axillary node dissection.  C.  Radiation in 1 dose to R iliac region.  C. Herceptin for 2 years taxane for a prescribed course then stopped, monthly zoledronic acid.  She had 2 years of Herceptin with tamoxifen added after chemotherapy.  D.  Tamoxifen alone and zoledronic acid every 3 months.  E.  [place]  We restarted Herceptin every 3 weeks and continued tamoxifen. Bone targeted agent changed to denosumab every 9 weeks.     FOLLOWUP NOTE     CT stable.     INTERVAL HISTORY:  Hoda returns to clinic and has been doing entirely well.  She has no pain, no fatigue, no depression, no anxiety.      REVIEW OF SYSTEMS:  A 10-point review of systems is entirely  negative.  She has no complaints today.      PHYSICAL EXAMINATION:   VITAL SIGNS:  Blood pressure 130/69, temperature 97.8, pulse 88, respirations 16, O2 sat 99% on room air, weight 79.2 kg.     GENERAL:  Hoda appeared generally well.  She has no alopecia.   HEENT:  Oropharynx is without lesions.   LYMPH:  There is no cervical, supraclavicular, subclavicular or axillary lymphadenopathy.   BREASTS:  Exam was not performed today.   LUNGS:  Clear to percussion and auscultation.   HEART:  Regular rate and rhythm, S1, S2.   ABDOMEN:  Soft and nontender, without hepatosplenomegaly.   EXTREMITIES:  Without edema.   PSYCHIATRIC:  Mood and affect were normal.      LABORATORY DATA:  The CBC and CMP were within normal limits.      IMAGING:  CT scan of the chest, abdomen and pelvis 04/08/2019 showed no evidence of new metastatic disease in the chest, abdomen or pelvis.  Persistent metastatic bone lesions having not demonstrated significant FDG uptake on the 12/21/2017 PET/CT consistent with treated disease.  Stable pulmonary nodules, left mastectomy and left axillary post-treatment changes, unchanged.  A 10 x 8 mm indeterminate left chest wall nodular density since the PET/CT scan of 12/21/2017 having no FDG activity.      ASSESSMENT AND PLAN:    1. Hoda is a 64-year-old woman with a history of ER-positive, VA-positive, HER2-positive breast cancer.  She is from [facility], formally from [facility] and [place].  She is here for continuation of every 3-week Herceptin which she receives along with tamoxifen daily.  The tumor is ER-positive, VA-positive, HER2-positive.  She had metastatic disease at the time of presentation with bone-only metastases by report.  She presented with metastatic disease in 02/2014.  Staging was initially bone-only metastases.  She had a right hip metastasis.  She underwent neoadjuvant CAF mastectomy and left axillary lymph node dissection and radiation.  She had radiation  to the right iliac region where she had metastatic disease.  She continues on tamoxifen and every 3-week trastuzumab, and has had no evidence of disease progression over the past 2 years.   2.  Insomnia, responsive to mirtazapine.   3.  Discussion of bone health.  Xgeva every 84 days, or every 4th trastuzumab treatment.  She will continue with calcium and vitamin D.   4.  Followup.  We will see Hoda in followup in our clinic in 3 weeks.  We will perform restaging in July.  Echocardiogram 03/19/2019 showed a normal ejection fraction. Follow up with trastuzumab and visit with Jossie on 4-30, 5-21.  Follow up with me and trastuzumab on 6-11. Follow up with Jossie on trastuzumab on 7-2.  Follow up with me 7-23 with trastuzumab and CT CAP on 7-22.  CBC, CMP, CA27.29 and CEA all visits.     Thank you for allowing us to continue to participate in Hoda's care.      Lisandro Aguiar MD      [facility] [place]         I spent 25 minutes with the patient more than 50% of which was in counseling and coordination of care.     Again, thank you for allowing me to participate in the care of your patient.      Sincerely,    Lisandro Aguiar MD

## 2019-04-09 NOTE — NURSING NOTE
"Oncology Rooming Note    April 9, 2019 2:55 PM   Hoda Brush is a 64 year old female who presents for:    Chief Complaint   Patient presents with     Port Draw     Labs drawn via port by RN in lab. VS taken.      Oncology Clinic Visit     [facility] RETURN- BREAST CA     Initial Vitals: /69 (BP Location: Right arm, Patient Position: Sitting, Cuff Size: Adult Regular)   Pulse 88   Temp 97.8  F (36.6  C) (Oral)   Resp 16   Wt 79.2 kg (174 lb 9.6 oz)   SpO2 99%   BMI 30.94 kg/m   Estimated body mass index is 30.94 kg/m  as calculated from the following:    Height as of 3/19/19: 1.6 m (5' 2.99\").    Weight as of this encounter: 79.2 kg (174 lb 9.6 oz). Body surface area is 1.88 meters squared.  No Pain (0) Comment: Data Unavailable   No LMP recorded. Patient is postmenopausal.  Allergies reviewed: Yes  Medications reviewed: Yes    Medications: Medication refills not needed today.  Pharmacy name entered into WhoisEDI: [facility] [address] [place] [address] [id number] AT  [address] [facility]    Clinical concerns: No new concerns. Stefania was notified.      Vik Tenorio LPN            "

## 2019-04-09 NOTE — PROGRESS NOTES
Infusion Nursing Note:  Hoda Brush presents today for Cycle 28 Day 1 Herceptin, Cycle 14 Day 1 Xgeva.    Patient seen by provider today: Yes: Dr. Aguiar   present during visit today: Yes, Language: Tunisian.     Note: Patient states she is taking calcium and vitamin D supplements as prescribed. States she had a routine dental cleaning about two weeks ago. No concerns at this time.     Intravenous Access:  Implanted Port.    Treatment Conditions:  Lab Results   Component Value Date    HGB 11.8 04/09/2019     Lab Results   Component Value Date    WBC 7.6 04/09/2019      Lab Results   Component Value Date    ANEU 3.9 04/09/2019     Lab Results   Component Value Date     04/09/2019      Lab Results   Component Value Date     04/09/2019                   Lab Results   Component Value Date    POTASSIUM 4.0 04/09/2019           No results found for: MAG         Lab Results   Component Value Date    CR 0.73 04/09/2019                   Lab Results   Component Value Date    TAYLER 9.2 04/09/2019                Lab Results   Component Value Date    BILITOTAL 0.1 04/09/2019           Lab Results   Component Value Date    ALBUMIN 3.2 04/09/2019                    Lab Results   Component Value Date    ALT 27 04/09/2019           Lab Results   Component Value Date    AST 23 04/09/2019     Corrected Calcium 9.84 mg/dL  Results reviewed, labs MET treatment parameters, ok to proceed with treatment.  ECHO/MUGA completed 3/19/19  EF 60-65%.      Post Infusion Assessment:  Patient tolerated infusion without incident.  Patient tolerated Xgeva injection to Right Arm without incident.  Blood return noted pre and post infusion.  Access discontinued per protocol.       Discharge Plan:   Patient declined prescription refills.  Discharge instructions reviewed with: Patient.  Patient verbalized understanding of discharge instructions and all questions answered.  Copy of AVS reviewed with patient.  Patient will  return 4/30/19 for next infusion appointment.  Patient discharged in stable condition accompanied by: .  Face to Face time: 0.    LAURA BROOKS RN

## 2019-04-13 RX ORDER — TAMOXIFEN CITRATE 20 MG/1
20 TABLET ORAL DAILY
Qty: 90 TABLET | Refills: 3 | Status: SHIPPED | OUTPATIENT
Start: 2019-04-13 | End: 2019-11-21

## 2019-04-30 ENCOUNTER — APPOINTMENT (OUTPATIENT)
Dept: LAB | Facility: CLINIC | Age: 64
End: 2019-04-30
Attending: PHYSICIAN ASSISTANT
Payer: COMMERCIAL

## 2019-04-30 ENCOUNTER — INFUSION THERAPY VISIT (OUTPATIENT)
Dept: ONCOLOGY | Facility: CLINIC | Age: 64
End: 2019-04-30
Attending: PHYSICIAN ASSISTANT
Payer: COMMERCIAL

## 2019-04-30 VITALS
SYSTOLIC BLOOD PRESSURE: 107 MMHG | OXYGEN SATURATION: 97 % | HEART RATE: 80 BPM | DIASTOLIC BLOOD PRESSURE: 68 MMHG | WEIGHT: 174.6 LBS | BODY MASS INDEX: 30.94 KG/M2 | TEMPERATURE: 98 F

## 2019-04-30 DIAGNOSIS — C50.912 MALIGNANT NEOPLASM OF LEFT FEMALE BREAST, UNSPECIFIED ESTROGEN RECEPTOR STATUS, UNSPECIFIED SITE OF BREAST (H): Primary | ICD-10-CM

## 2019-04-30 LAB
ALBUMIN SERPL-MCNC: 3.2 G/DL (ref 3.4–5)
ALP SERPL-CCNC: 38 U/L (ref 40–150)
ALT SERPL W P-5'-P-CCNC: 30 U/L (ref 0–50)
ANION GAP SERPL CALCULATED.3IONS-SCNC: 4 MMOL/L (ref 3–14)
AST SERPL W P-5'-P-CCNC: 22 U/L (ref 0–45)
BASOPHILS # BLD AUTO: 0 10E9/L (ref 0–0.2)
BASOPHILS NFR BLD AUTO: 0.6 %
BILIRUB SERPL-MCNC: 0.3 MG/DL (ref 0.2–1.3)
BUN SERPL-MCNC: 15 MG/DL (ref 7–30)
CALCIUM SERPL-MCNC: 8.7 MG/DL (ref 8.5–10.1)
CANCER AG27-29 SERPL-ACNC: 8 U/ML (ref 0–39)
CEA SERPL-MCNC: 0.8 UG/L (ref 0–2.5)
CHLORIDE SERPL-SCNC: 108 MMOL/L (ref 94–109)
CO2 SERPL-SCNC: 28 MMOL/L (ref 20–32)
CREAT SERPL-MCNC: 0.7 MG/DL (ref 0.52–1.04)
DIFFERENTIAL METHOD BLD: ABNORMAL
EOSINOPHIL # BLD AUTO: 0.2 10E9/L (ref 0–0.7)
EOSINOPHIL NFR BLD AUTO: 3.5 %
ERYTHROCYTE [DISTWIDTH] IN BLOOD BY AUTOMATED COUNT: 13 % (ref 10–15)
GFR SERPL CREATININE-BSD FRML MDRD: >90 ML/MIN/{1.73_M2}
GLUCOSE SERPL-MCNC: 85 MG/DL (ref 70–99)
HCT VFR BLD AUTO: 36.1 % (ref 35–47)
HGB BLD-MCNC: 11.3 G/DL (ref 11.7–15.7)
IMM GRANULOCYTES # BLD: 0 10E9/L (ref 0–0.4)
IMM GRANULOCYTES NFR BLD: 0.3 %
LYMPHOCYTES # BLD AUTO: 2.9 10E9/L (ref 0.8–5.3)
LYMPHOCYTES NFR BLD AUTO: 45.5 %
MCH RBC QN AUTO: 29.9 PG (ref 26.5–33)
MCHC RBC AUTO-ENTMCNC: 31.3 G/DL (ref 31.5–36.5)
MCV RBC AUTO: 96 FL (ref 78–100)
MONOCYTES # BLD AUTO: 0.5 10E9/L (ref 0–1.3)
MONOCYTES NFR BLD AUTO: 7.6 %
NEUTROPHILS # BLD AUTO: 2.7 10E9/L (ref 1.6–8.3)
NEUTROPHILS NFR BLD AUTO: 42.5 %
NRBC # BLD AUTO: 0 10*3/UL
NRBC BLD AUTO-RTO: 0 /100
PLATELET # BLD AUTO: 188 10E9/L (ref 150–450)
POTASSIUM SERPL-SCNC: 4 MMOL/L (ref 3.4–5.3)
PROT SERPL-MCNC: 7.2 G/DL (ref 6.8–8.8)
RBC # BLD AUTO: 3.78 10E12/L (ref 3.8–5.2)
SODIUM SERPL-SCNC: 141 MMOL/L (ref 133–144)
WBC # BLD AUTO: 6.4 10E9/L (ref 4–11)

## 2019-04-30 PROCEDURE — 96413 CHEMO IV INFUSION 1 HR: CPT

## 2019-04-30 PROCEDURE — 86300 IMMUNOASSAY TUMOR CA 15-3: CPT | Performed by: PHYSICIAN ASSISTANT

## 2019-04-30 PROCEDURE — 25800030 ZZH RX IP 258 OP 636: Mod: ZF | Performed by: PHYSICIAN ASSISTANT

## 2019-04-30 PROCEDURE — 99214 OFFICE O/P EST MOD 30 MIN: CPT | Mod: ZP | Performed by: PHYSICIAN ASSISTANT

## 2019-04-30 PROCEDURE — T1013 SIGN LANG/ORAL INTERPRETER: HCPCS | Mod: U3,ZF

## 2019-04-30 PROCEDURE — 85025 COMPLETE CBC W/AUTO DIFF WBC: CPT | Performed by: PHYSICIAN ASSISTANT

## 2019-04-30 PROCEDURE — 25000128 H RX IP 250 OP 636: Mod: ZF | Performed by: PHYSICIAN ASSISTANT

## 2019-04-30 PROCEDURE — 80053 COMPREHEN METABOLIC PANEL: CPT | Performed by: PHYSICIAN ASSISTANT

## 2019-04-30 PROCEDURE — G0463 HOSPITAL OUTPT CLINIC VISIT: HCPCS | Mod: ZF

## 2019-04-30 PROCEDURE — 82378 CARCINOEMBRYONIC ANTIGEN: CPT | Performed by: PHYSICIAN ASSISTANT

## 2019-04-30 RX ORDER — HEPARIN SODIUM (PORCINE) LOCK FLUSH IV SOLN 100 UNIT/ML 100 UNIT/ML
5 SOLUTION INTRAVENOUS EVERY 8 HOURS
Status: DISCONTINUED | OUTPATIENT
Start: 2019-04-30 | End: 2019-04-30 | Stop reason: HOSPADM

## 2019-04-30 RX ORDER — HEPARIN SODIUM (PORCINE) LOCK FLUSH IV SOLN 100 UNIT/ML 100 UNIT/ML
5 SOLUTION INTRAVENOUS EVERY 8 HOURS
Status: CANCELLED | OUTPATIENT
Start: 2019-04-30

## 2019-04-30 RX ORDER — ALBUTEROL SULFATE 90 UG/1
1-2 AEROSOL, METERED RESPIRATORY (INHALATION)
Status: CANCELLED
Start: 2019-04-30

## 2019-04-30 RX ORDER — MEPERIDINE HYDROCHLORIDE 25 MG/ML
25 INJECTION INTRAMUSCULAR; INTRAVENOUS; SUBCUTANEOUS EVERY 30 MIN PRN
Status: CANCELLED | OUTPATIENT
Start: 2019-04-30

## 2019-04-30 RX ORDER — SODIUM CHLORIDE 9 MG/ML
1000 INJECTION, SOLUTION INTRAVENOUS CONTINUOUS PRN
Status: CANCELLED
Start: 2019-04-30

## 2019-04-30 RX ORDER — EPINEPHRINE 1 MG/ML
0.3 INJECTION, SOLUTION INTRAMUSCULAR; SUBCUTANEOUS EVERY 5 MIN PRN
Status: CANCELLED | OUTPATIENT
Start: 2019-04-30

## 2019-04-30 RX ORDER — ALBUTEROL SULFATE 0.83 MG/ML
2.5 SOLUTION RESPIRATORY (INHALATION)
Status: CANCELLED | OUTPATIENT
Start: 2019-04-30

## 2019-04-30 RX ORDER — DIPHENHYDRAMINE HYDROCHLORIDE 50 MG/ML
50 INJECTION INTRAMUSCULAR; INTRAVENOUS
Status: CANCELLED
Start: 2019-04-30

## 2019-04-30 RX ORDER — LORAZEPAM 2 MG/ML
0.5 INJECTION INTRAMUSCULAR EVERY 4 HOURS PRN
Status: CANCELLED
Start: 2019-04-30

## 2019-04-30 RX ORDER — ACETAMINOPHEN 325 MG/1
650 TABLET ORAL
Status: CANCELLED | OUTPATIENT
Start: 2019-04-30

## 2019-04-30 RX ORDER — METHYLPREDNISOLONE SODIUM SUCCINATE 125 MG/2ML
125 INJECTION, POWDER, LYOPHILIZED, FOR SOLUTION INTRAMUSCULAR; INTRAVENOUS
Status: CANCELLED
Start: 2019-04-30

## 2019-04-30 RX ORDER — EPINEPHRINE 0.3 MG/.3ML
0.3 INJECTION SUBCUTANEOUS EVERY 5 MIN PRN
Status: CANCELLED | OUTPATIENT
Start: 2019-04-30

## 2019-04-30 RX ORDER — DIPHENHYDRAMINE HCL 25 MG
50 CAPSULE ORAL ONCE
Status: CANCELLED
Start: 2019-04-30

## 2019-04-30 RX ADMIN — TRASTUZUMAB 450 MG: 150 INJECTION, POWDER, LYOPHILIZED, FOR SOLUTION INTRAVENOUS at 08:55

## 2019-04-30 RX ADMIN — SODIUM CHLORIDE 250 ML: 9 INJECTION, SOLUTION INTRAVENOUS at 08:51

## 2019-04-30 RX ADMIN — Medication 5 ML: at 07:20

## 2019-04-30 RX ADMIN — HEPARIN 5 ML: 100 SYRINGE at 09:28

## 2019-04-30 ASSESSMENT — PAIN SCALES - GENERAL: PAINLEVEL: NO PAIN (0)

## 2019-04-30 NOTE — NURSING NOTE
"Oncology Rooming Note    April 30, 2019 7:41 AM   Hoda Brush is a 64 year old female who presents for:    Chief Complaint   Patient presents with     Lab Only     labs drawn via port by RN in lab, saline and heparin locked, vitals completed     Oncology Clinic Visit     Breast Ca , Labs, Tx     Initial Vitals: /68   Pulse 80   Temp 98  F (36.7  C) (Oral)   Wt 79.2 kg (174 lb 9.6 oz)   SpO2 97%   BMI 30.94 kg/m   Estimated body mass index is 30.94 kg/m  as calculated from the following:    Height as of 3/19/19: 1.6 m (5' 2.99\").    Weight as of this encounter: 79.2 kg (174 lb 9.6 oz). Body surface area is 1.88 meters squared.  No Pain (0) Comment: Data Unavailable   No LMP recorded. Patient is postmenopausal.  Allergies reviewed: Yes  Medications reviewed: Yes    Medications: Medication refills not needed today.  Pharmacy name entered into [employer]: [facility] [address] [place], [place] [id number] AT  [address] [facility]    Clinical concerns: no concerns  Kyara  was notified.      Amanda Hernandez MA              "

## 2019-04-30 NOTE — LETTER
4/30/2019      RE: Hoda Brush  [address] [facility] [address]       Oncology/Hematology Visit Note  Apr 30, 2019    Reason for Visit: follow up of ER positive, HER2 positive left metastatic breast cancer    History of Present Illness: Hoda Brush is a 64 year old female with ER positive, HER2 positive left metastatic breast cancer with bone mets.     TREATMENT HISTORY:  A. Initial diagnosis with metastatic breast cancer in [facility].  Neoadjuvant CAF x 6.   B. Left mastectomy. Left axillary node dissection.  C.  Radiation in 1 dose to R iliac region. g Gy.  C. Herceptin for 2 years taxane for a prescribed course then stopped, monthly zoledronic acid.  She had 2 years of Herceptin with tamoxifen added after chemotherapy.  D.  Tamoxifen alone and zoledronic acid every 3 months.  E.  [place]  We restarted Herceptin every 3 weeks and continued tamoxifen. Bone targeted agent changed to denosumab every 6 weeks.      She is from [facility], formerly from [facility], [place]  She lives with her daughter and is here for continuation of every 3 week Herceptin, which she receives along with tamoxifen.  Her tumor is ER positive, AR positive, HER-2 positive.  She had metastatic disease at the time of presentation with bone-only metastases by report.  She presented with metastatic disease in 02/2014.  Staging was initially bone-only metastases by report.  She did her staging in 02/2014 that showed that she had stage IV disease, T4N2M1 invasive ductal carcinoma of the left breast.  She had a right hip metastasis.  She underwent neoadjuvant CAF, left mastectomy, left axillary lymph node dissection and radiation.  She had radiation of the right iliac region where she had metastatic disease.  Pathology report from [facility] shows the tumor to be positive for ER in 75% of the cells, positive for AR in 40% of the cells, and the HER-2 was 3+ positive by immunohistochemistry.  The Ki-67 was  20%.  She had no evidence of nonbone metastatic disease on her PET/CT scan from 08/2017.    Restaging on 4/8/19 was stable.     Interval History:  Hoda is here for follow up today.  present. She is feeling overall well. She continues to see her dentist for ongoing issues. She denies any pain. She has not had any recurrence of episodes of chest pain. Denies any dizziness, dyspnea, pleuritic pain. No orthopnea, leg swelling. No LUE swelling. Appetite good. Tolerating tamoxifen. No vaginal bleeding. Taking calcium and vitamin D. No depression or anxiety.    Review of Systems:  Patient denies any of the following except if noted above: fevers, chills, abdominal pain, nausea, vomiting, diarrhea, constipation, urinary concerns, headaches, cough.    Current Outpatient Medications   Medication Sig Dispense Refill     melatonin 3 MG tablet Take 1 tablet (3 mg) by mouth nightly as needed for sleep 30 tablet 11     order for DME Equipment being ordered: 2 Mastectomy bras and 1 prosthetheses. 2 Piece 0     order for DME L UE class 2 compression sleeve and gauntlet  Night garment  Bandaging supplies 1 each 1     tamoxifen (NOLVADEX) 20 MG tablet Take 1 tablet (20 mg) by mouth daily 90 tablet 3     VITAMIN D, CHOLECALCIFEROL, PO Take 1,000 Units by mouth daily         Physical Examination:  General: The patient is a pleasant female in no acute distress.  /68   Pulse 80   Temp 98  F (36.7  C) (Oral)   Wt 79.2 kg (174 lb 9.6 oz)   SpO2 97%   BMI 30.94 kg/m     Wt Readings from Last 10 Encounters:   04/30/19 79.2 kg (174 lb 9.6 oz)   04/09/19 79.2 kg (174 lb 9.6 oz)   03/19/19 79.2 kg (174 lb 8 oz)   02/26/19 80 kg (176 lb 6.4 oz)   02/05/19 80.2 kg (176 lb 14.4 oz)   01/15/19 79.1 kg (174 lb 4.8 oz)   01/08/19 79.9 kg (176 lb 3.2 oz)   12/24/18 80.2 kg (176 lb 12.8 oz)   12/05/18 81.8 kg (180 lb 4.8 oz)   11/13/18 82.5 kg (181 lb 12.8 oz)     HEENT: EOMI, PERRL. Sclerae are anicteric. Oral mucosa is pink and  moist with no lesions or thrush.   Lymph: Neck is supple with no lymphadenopathy in the cervical or supraclavicular areas.   Heart: Regular rate and rhythm.   Lungs: Clear to auscultation bilaterally.   Abdomen: Bowel sounds present, soft, nontender with no palpable hepatosplenomegaly or masses.   Extremities: No lower extremity edema noted bilaterally.   Neuro: Cranial nerves II through XII are grossly intact.  Skin: No rashes, petechiae, or bruising noted on exposed skin.    Laboratory Data:  Results for NATASHA LOUIS (MRN [id number]) as of 4/30/2019 08:07   4/30/2019 07:25   Sodium 141   Potassium 4.0   Chloride 108   Carbon Dioxide 28   Urea Nitrogen 15   Creatinine 0.70   GFR Estimate >90   GFR Estimate If Black >90   Calcium 8.7   Anion Gap 4   Albumin 3.2 (L)   Protein Total 7.2   Bilirubin Total 0.3   Alkaline Phosphatase 38 (L)   ALT 30   AST 22   Glucose 85   WBC 6.4   Hemoglobin 11.3 (L)   Hematocrit 36.1   Platelet Count 188   RBC Count 3.78 (L)   MCV 96   MCH 29.9   MCHC 31.3 (L)   RDW 13.0   Diff Method Automated Method   % Neutrophils 42.5   % Lymphocytes 45.5   % Monocytes 7.6   % Eosinophils 3.5   % Basophils 0.6   % Immature Granulocytes 0.3   Nucleated RBCs 0   Absolute Neutrophil 2.7   Absolute Lymphocytes 2.9   Absolute Monocytes 0.5   Absolute Eosinophils 0.2   Absolute Basophils 0.0   Abs Immature Granulocytes 0.0   Absolute Nucleated RBC 0.0     Results for NATASHA LOUIS (MRN [id number]) as of 4/30/2019 11:27   Ref. Range 3/19/2019 14:48 4/8/2019 14:05 4/9/2019 14:32 4/30/2019 07:25   CA 27-29 Latest Ref Range: 0 - 39 U/mL 11  12 8     Results for NATASHA LOUIS (MRN [id number]) as of 4/30/2019 11:27   Ref. Range 3/19/2019 14:48 4/8/2019 14:05 4/9/2019 14:32 4/30/2019 07:25   CEA Latest Ref Range: 0 - 2.5 ug/L 1.0  0.9 0.8     Assessment and Plan:    1. Metastatic breast cancer, ER, [place], HER2+ positive: Was last treated in [facility] with Herceptin. We have continued  Herceptin every 3 weeks as well as daily tamoxifen. CT CAP on 4/8/19 with stable disease. Echocardiogram on 3/19 with EF 60-65% and normal LV function. Tolerating well. Tumor markers stable  --Will proceed with Herceptin. Continue Tamoxifen.   --Next echo due ~6/11    2. Bone metastasis: On xgeva every 6 weeks. On calcium + vitamin D. Calcium corrects to WNL. Cleared by dentist to proceed. Last dose 4/9. Next dose due 7/2    3. Atypical chest pain: this has not recurred. She should see cardiology and have stress test if this recurs.       4. LUE lymphedema: She completed lymphedema treatment and has a sleeve to use now.      5. Insomnia: She feels melatonin is working. No longer taking remeron as was too sedating.     Follow up with myself on 5/21 with labs, Herceptin and Dr. Aguiar on 6/11. Will add Echo on 6/11. Follow up 7/2 with myself. Will also be due for Xgeva. Next CT 7/22 with labs, Dr. Aguiar and Herceptin on 7/23.    Jossie Sparrow PA-C  [facility]  [address] [place] [address] [phone]

## 2019-04-30 NOTE — PROGRESS NOTES
Oncology/Hematology Visit Note  Apr 30, 2019    Reason for Visit: follow up of ER positive, HER2 positive left metastatic breast cancer    History of Present Illness: Hoda Brush is a 64 year old female with ER positive, HER2 positive left metastatic breast cancer with bone mets.     TREATMENT HISTORY:  A. Initial diagnosis with metastatic breast cancer in [facility].  Neoadjuvant CAF x 6.   B. Left mastectomy. Left axillary node dissection.  C.  Radiation in 1 dose to R iliac region. g Gy.  C. Herceptin for 2 years taxane for a prescribed course then stopped, monthly zoledronic acid.  She had 2 years of Herceptin with tamoxifen added after chemotherapy.  D.  Tamoxifen alone and zoledronic acid every 3 months.  E.  [place]  We restarted Herceptin every 3 weeks and continued tamoxifen. Bone targeted agent changed to denosumab every 6 weeks.      She is from [facility], formerly from [facility], and [place]  She lives with her daughter and is here for continuation of every 3 week Herceptin, which she receives along with tamoxifen.  Her tumor is ER positive, VA positive, HER-2 positive.  She had metastatic disease at the time of presentation with bone-only metastases by report.  She presented with metastatic disease in 02/2014.  Staging was initially bone-only metastases by report.  She did her staging in 02/2014 that showed that she had stage IV disease, T4N2M1 invasive ductal carcinoma of the left breast.  She had a right hip metastasis.  She underwent neoadjuvant CAF, left mastectomy, left axillary lymph node dissection and radiation.  She had radiation of the right iliac region where she had metastatic disease.  Pathology report from [facility] shows the tumor to be positive for ER in 75% of the cells, positive for VA in 40% of the cells, and the HER-2 was 3+ positive by immunohistochemistry.  The Ki-67 was 20%.  She had no evidence of nonbone metastatic disease on her PET/CT scan from  08/2017.    Restaging on 4/8/19 was stable.     Interval History:  Hoda is here for follow up today.  present. She is feeling overall well. She continues to see her dentist for ongoing issues. She denies any pain. She has not had any recurrence of episodes of chest pain. Denies any dizziness, dyspnea, pleuritic pain. No orthopnea, leg swelling. No LUE swelling. Appetite good. Tolerating tamoxifen. No vaginal bleeding. Taking calcium and vitamin D. No depression or anxiety.    Review of Systems:  Patient denies any of the following except if noted above: fevers, chills, abdominal pain, nausea, vomiting, diarrhea, constipation, urinary concerns, headaches, cough.    Current Outpatient Medications   Medication Sig Dispense Refill     melatonin 3 MG tablet Take 1 tablet (3 mg) by mouth nightly as needed for sleep 30 tablet 11     order for DME Equipment being ordered: 2 Mastectomy bras and 1 prosthetheses. 2 Piece 0     order for DME L UE class 2 compression sleeve and gauntlet  Night garment  Bandaging supplies 1 each 1     tamoxifen (NOLVADEX) 20 MG tablet Take 1 tablet (20 mg) by mouth daily 90 tablet 3     VITAMIN D, CHOLECALCIFEROL, PO Take 1,000 Units by mouth daily         Physical Examination:  General: The patient is a pleasant female in no acute distress.  /68   Pulse 80   Temp 98  F (36.7  C) (Oral)   Wt 79.2 kg (174 lb 9.6 oz)   SpO2 97%   BMI 30.94 kg/m    Wt Readings from Last 10 Encounters:   04/30/19 79.2 kg (174 lb 9.6 oz)   04/09/19 79.2 kg (174 lb 9.6 oz)   03/19/19 79.2 kg (174 lb 8 oz)   02/26/19 80 kg (176 lb 6.4 oz)   02/05/19 80.2 kg (176 lb 14.4 oz)   01/15/19 79.1 kg (174 lb 4.8 oz)   01/08/19 79.9 kg (176 lb 3.2 oz)   12/24/18 80.2 kg (176 lb 12.8 oz)   12/05/18 81.8 kg (180 lb 4.8 oz)   11/13/18 82.5 kg (181 lb 12.8 oz)     HEENT: EOMI, PERRL. Sclerae are anicteric. Oral mucosa is pink and moist with no lesions or thrush.   Lymph: Neck is supple with no lymphadenopathy  in the cervical or supraclavicular areas.   Heart: Regular rate and rhythm.   Lungs: Clear to auscultation bilaterally.   Abdomen: Bowel sounds present, soft, nontender with no palpable hepatosplenomegaly or masses.   Extremities: No lower extremity edema noted bilaterally.   Neuro: Cranial nerves II through XII are grossly intact.  Skin: No rashes, petechiae, or bruising noted on exposed skin.    Laboratory Data:  Results for NATASHA LOUIS (MRN [id number]) as of 4/30/2019 08:07   4/30/2019 07:25   Sodium 141   Potassium 4.0   Chloride 108   Carbon Dioxide 28   Urea Nitrogen 15   Creatinine 0.70   GFR Estimate >90   GFR Estimate If Black >90   Calcium 8.7   Anion Gap 4   Albumin 3.2 (L)   Protein Total 7.2   Bilirubin Total 0.3   Alkaline Phosphatase 38 (L)   ALT 30   AST 22   Glucose 85   WBC 6.4   Hemoglobin 11.3 (L)   Hematocrit 36.1   Platelet Count 188   RBC Count 3.78 (L)   MCV 96   MCH 29.9   MCHC 31.3 (L)   RDW 13.0   Diff Method Automated Method   % Neutrophils 42.5   % Lymphocytes 45.5   % Monocytes 7.6   % Eosinophils 3.5   % Basophils 0.6   % Immature Granulocytes 0.3   Nucleated RBCs 0   Absolute Neutrophil 2.7   Absolute Lymphocytes 2.9   Absolute Monocytes 0.5   Absolute Eosinophils 0.2   Absolute Basophils 0.0   Abs Immature Granulocytes 0.0   Absolute Nucleated RBC 0.0     Results for NATASHA LOUIS (MRN [id number]) as of 4/30/2019 11:27   Ref. Range 3/19/2019 14:48 4/8/2019 14:05 4/9/2019 14:32 4/30/2019 07:25   CA 27-29 Latest Ref Range: 0 - 39 U/mL 11  12 8     Results for NATASHA LOUIS (MRN [id number]) as of 4/30/2019 11:27   Ref. Range 3/19/2019 14:48 4/8/2019 14:05 4/9/2019 14:32 4/30/2019 07:25   CEA Latest Ref Range: 0 - 2.5 ug/L 1.0  0.9 0.8     Assessment and Plan:    1. Metastatic breast cancer, ER, [place], HER2+ positive: Was last treated in [facility] with Herceptin. We have continued Herceptin every 3 weeks as well as daily tamoxifen. CT CAP on 4/8/19 with stable  disease. Echocardiogram on 3/19 with EF 60-65% and normal LV function. Tolerating well. Tumor markers stable  --Will proceed with Herceptin. Continue Tamoxifen.   --Next echo due ~6/11    2. Bone metastasis: On xgeva every 6 weeks. On calcium + vitamin D. Calcium corrects to WNL. Cleared by dentist to proceed. Last dose 4/9. Next dose due 7/2    3. Atypical chest pain: this has not recurred. She should see cardiology and have stress test if this recurs.       4. LUE lymphedema: She completed lymphedema treatment and has a sleeve to use now.      5. Insomnia: She feels melatonin is working. No longer taking remeron as was too sedating.     Follow up with myself on 5/21 with labs, Herceptin and Dr. Aguiar on 6/11. Will add Echo on 6/11. Follow up 7/2 with myself. Will also be due for Xgeva. Next CT 7/22 with labs, Dr. Aguiar and Herceptin on 7/23.    Jossie Sparrow PA-C  [facility]  [address] [place] [address]  [phone]

## 2019-04-30 NOTE — PROGRESS NOTES
Infusion Nursing Note:  Hoda Brush presents today for Cycle 29 Day 1 Herceptin.    Patient seen by provider today: Yes: SEMAJ Escobar   present during visit today: Yes, Malagasy    Note: Patient is feeling well today, she denies any new complaints.    Intravenous Access:  Implanted Port.    Treatment Conditions:  Lab Results   Component Value Date    HGB 11.3 04/30/2019     Lab Results   Component Value Date    WBC 6.4 04/30/2019      Lab Results   Component Value Date    ANEU 2.7 04/30/2019     Lab Results   Component Value Date     04/30/2019      Lab Results   Component Value Date     04/30/2019                   Lab Results   Component Value Date    POTASSIUM 4.0 04/30/2019           Lab Results   Component Value Date    CR 0.70 04/30/2019                   Lab Results   Component Value Date    TAYLER 8.7 04/30/2019                Lab Results   Component Value Date    BILITOTAL 0.3 04/30/2019           Lab Results   Component Value Date    ALBUMIN 3.2 04/30/2019                    Lab Results   Component Value Date    ALT 30 04/30/2019           Lab Results   Component Value Date    AST 22 04/30/2019       Results reviewed, labs MET treatment parameters, ok to proceed with treatment.  ECHO/MUGA completed 3/19/2019  EF 60-65%.      Post Infusion Assessment:  Patient tolerated infusion without incident.  Blood return noted pre and post infusion.  Site patent and intact, free from redness, edema or discomfort.  No evidence of extravasations.  Access discontinued per protocol.       Discharge Plan:   Patient declined prescription refills.  Discharge instructions reviewed with: Patient.  Patient and/or family verbalized understanding of discharge instructions and all questions answered.  AVS to patient via [employer].  Patient will return 5/21/2019 for next appointment.   Patient discharged in stable condition accompanied by: self.  Departure Mode: Ambulatory.    Kerrie Junior  RN

## 2019-05-21 ENCOUNTER — APPOINTMENT (OUTPATIENT)
Dept: LAB | Facility: CLINIC | Age: 64
End: 2019-05-21
Attending: INTERNAL MEDICINE
Payer: COMMERCIAL

## 2019-05-21 ENCOUNTER — INFUSION THERAPY VISIT (OUTPATIENT)
Dept: ONCOLOGY | Facility: CLINIC | Age: 64
End: 2019-05-21
Attending: INTERNAL MEDICINE
Payer: COMMERCIAL

## 2019-05-21 ENCOUNTER — ONCOLOGY VISIT (OUTPATIENT)
Dept: ONCOLOGY | Facility: CLINIC | Age: 64
End: 2019-05-21
Attending: PHYSICIAN ASSISTANT
Payer: COMMERCIAL

## 2019-05-21 VITALS
BODY MASS INDEX: 31.36 KG/M2 | WEIGHT: 177 LBS | DIASTOLIC BLOOD PRESSURE: 80 MMHG | OXYGEN SATURATION: 98 % | HEIGHT: 63 IN | RESPIRATION RATE: 18 BRPM | HEART RATE: 64 BPM | SYSTOLIC BLOOD PRESSURE: 125 MMHG | TEMPERATURE: 97.3 F

## 2019-05-21 DIAGNOSIS — C50.912 MALIGNANT NEOPLASM OF LEFT FEMALE BREAST, UNSPECIFIED ESTROGEN RECEPTOR STATUS, UNSPECIFIED SITE OF BREAST (H): Primary | ICD-10-CM

## 2019-05-21 DIAGNOSIS — C50.919 METASTATIC BREAST CANCER: Primary | ICD-10-CM

## 2019-05-21 LAB
ALBUMIN SERPL-MCNC: 3.1 G/DL (ref 3.4–5)
ALP SERPL-CCNC: 38 U/L (ref 40–150)
ALT SERPL W P-5'-P-CCNC: 24 U/L (ref 0–50)
ANION GAP SERPL CALCULATED.3IONS-SCNC: 6 MMOL/L (ref 3–14)
AST SERPL W P-5'-P-CCNC: 22 U/L (ref 0–45)
BASOPHILS # BLD AUTO: 0 10E9/L (ref 0–0.2)
BASOPHILS NFR BLD AUTO: 0.4 %
BILIRUB SERPL-MCNC: 0.3 MG/DL (ref 0.2–1.3)
BUN SERPL-MCNC: 14 MG/DL (ref 7–30)
CALCIUM SERPL-MCNC: 8.4 MG/DL (ref 8.5–10.1)
CANCER AG27-29 SERPL-ACNC: 7 U/ML (ref 0–39)
CEA SERPL-MCNC: 0.6 UG/L (ref 0–2.5)
CHLORIDE SERPL-SCNC: 112 MMOL/L (ref 94–109)
CO2 SERPL-SCNC: 27 MMOL/L (ref 20–32)
CREAT SERPL-MCNC: 0.75 MG/DL (ref 0.52–1.04)
DIFFERENTIAL METHOD BLD: ABNORMAL
EOSINOPHIL # BLD AUTO: 0.3 10E9/L (ref 0–0.7)
EOSINOPHIL NFR BLD AUTO: 3.7 %
ERYTHROCYTE [DISTWIDTH] IN BLOOD BY AUTOMATED COUNT: 13.6 % (ref 10–15)
GFR SERPL CREATININE-BSD FRML MDRD: 85 ML/MIN/{1.73_M2}
GLUCOSE SERPL-MCNC: 86 MG/DL (ref 70–99)
HCT VFR BLD AUTO: 35.8 % (ref 35–47)
HGB BLD-MCNC: 11.2 G/DL (ref 11.7–15.7)
IMM GRANULOCYTES # BLD: 0 10E9/L (ref 0–0.4)
IMM GRANULOCYTES NFR BLD: 0.1 %
LYMPHOCYTES # BLD AUTO: 2.4 10E9/L (ref 0.8–5.3)
LYMPHOCYTES NFR BLD AUTO: 34 %
MCH RBC QN AUTO: 29.8 PG (ref 26.5–33)
MCHC RBC AUTO-ENTMCNC: 31.3 G/DL (ref 31.5–36.5)
MCV RBC AUTO: 95 FL (ref 78–100)
MONOCYTES # BLD AUTO: 0.5 10E9/L (ref 0–1.3)
MONOCYTES NFR BLD AUTO: 6.5 %
NEUTROPHILS # BLD AUTO: 3.9 10E9/L (ref 1.6–8.3)
NEUTROPHILS NFR BLD AUTO: 55.3 %
NRBC # BLD AUTO: 0 10*3/UL
NRBC BLD AUTO-RTO: 0 /100
PLATELET # BLD AUTO: 175 10E9/L (ref 150–450)
POTASSIUM SERPL-SCNC: 4 MMOL/L (ref 3.4–5.3)
PROT SERPL-MCNC: 6.9 G/DL (ref 6.8–8.8)
RBC # BLD AUTO: 3.76 10E12/L (ref 3.8–5.2)
SODIUM SERPL-SCNC: 145 MMOL/L (ref 133–144)
WBC # BLD AUTO: 7 10E9/L (ref 4–11)

## 2019-05-21 PROCEDURE — 85025 COMPLETE CBC W/AUTO DIFF WBC: CPT | Performed by: INTERNAL MEDICINE

## 2019-05-21 PROCEDURE — 25000128 H RX IP 250 OP 636: Mod: ZF | Performed by: PHYSICIAN ASSISTANT

## 2019-05-21 PROCEDURE — G0463 HOSPITAL OUTPT CLINIC VISIT: HCPCS | Mod: ZF

## 2019-05-21 PROCEDURE — 99214 OFFICE O/P EST MOD 30 MIN: CPT | Mod: ZP | Performed by: PHYSICIAN ASSISTANT

## 2019-05-21 PROCEDURE — 80053 COMPREHEN METABOLIC PANEL: CPT | Performed by: INTERNAL MEDICINE

## 2019-05-21 PROCEDURE — 86300 IMMUNOASSAY TUMOR CA 15-3: CPT | Performed by: INTERNAL MEDICINE

## 2019-05-21 PROCEDURE — 82378 CARCINOEMBRYONIC ANTIGEN: CPT | Performed by: INTERNAL MEDICINE

## 2019-05-21 PROCEDURE — 25800030 ZZH RX IP 258 OP 636: Mod: ZF | Performed by: PHYSICIAN ASSISTANT

## 2019-05-21 PROCEDURE — 96413 CHEMO IV INFUSION 1 HR: CPT

## 2019-05-21 RX ORDER — MEPERIDINE HYDROCHLORIDE 25 MG/ML
25 INJECTION INTRAMUSCULAR; INTRAVENOUS; SUBCUTANEOUS EVERY 30 MIN PRN
Status: CANCELLED | OUTPATIENT
Start: 2019-05-21

## 2019-05-21 RX ORDER — METHYLPREDNISOLONE SODIUM SUCCINATE 125 MG/2ML
125 INJECTION, POWDER, LYOPHILIZED, FOR SOLUTION INTRAMUSCULAR; INTRAVENOUS
Status: CANCELLED
Start: 2019-05-21

## 2019-05-21 RX ORDER — HEPARIN SODIUM (PORCINE) LOCK FLUSH IV SOLN 100 UNIT/ML 100 UNIT/ML
5 SOLUTION INTRAVENOUS EVERY 8 HOURS
Status: DISCONTINUED | OUTPATIENT
Start: 2019-05-21 | End: 2019-05-21 | Stop reason: HOSPADM

## 2019-05-21 RX ORDER — ALBUTEROL SULFATE 90 UG/1
1-2 AEROSOL, METERED RESPIRATORY (INHALATION)
Status: CANCELLED
Start: 2019-05-21

## 2019-05-21 RX ORDER — EPINEPHRINE 1 MG/ML
0.3 INJECTION, SOLUTION INTRAMUSCULAR; SUBCUTANEOUS EVERY 5 MIN PRN
Status: CANCELLED | OUTPATIENT
Start: 2019-05-21

## 2019-05-21 RX ORDER — LORAZEPAM 2 MG/ML
0.5 INJECTION INTRAMUSCULAR EVERY 4 HOURS PRN
Status: CANCELLED
Start: 2019-05-21

## 2019-05-21 RX ORDER — ACETAMINOPHEN 325 MG/1
650 TABLET ORAL
Status: CANCELLED | OUTPATIENT
Start: 2019-05-21

## 2019-05-21 RX ORDER — SODIUM CHLORIDE 9 MG/ML
1000 INJECTION, SOLUTION INTRAVENOUS CONTINUOUS PRN
Status: CANCELLED
Start: 2019-05-21

## 2019-05-21 RX ORDER — DIPHENHYDRAMINE HCL 25 MG
50 CAPSULE ORAL ONCE
Status: CANCELLED
Start: 2019-05-21

## 2019-05-21 RX ORDER — EPINEPHRINE 0.3 MG/.3ML
0.3 INJECTION SUBCUTANEOUS EVERY 5 MIN PRN
Status: CANCELLED | OUTPATIENT
Start: 2019-05-21

## 2019-05-21 RX ORDER — ALBUTEROL SULFATE 0.83 MG/ML
2.5 SOLUTION RESPIRATORY (INHALATION)
Status: CANCELLED | OUTPATIENT
Start: 2019-05-21

## 2019-05-21 RX ORDER — HEPARIN SODIUM (PORCINE) LOCK FLUSH IV SOLN 100 UNIT/ML 100 UNIT/ML
5 SOLUTION INTRAVENOUS EVERY 8 HOURS
Status: CANCELLED | OUTPATIENT
Start: 2019-05-21

## 2019-05-21 RX ORDER — DIPHENHYDRAMINE HYDROCHLORIDE 50 MG/ML
50 INJECTION INTRAMUSCULAR; INTRAVENOUS
Status: CANCELLED
Start: 2019-05-21

## 2019-05-21 RX ADMIN — HEPARIN 5 ML: 100 SYRINGE at 09:59

## 2019-05-21 RX ADMIN — HEPARIN 5 ML: 100 SYRINGE at 08:05

## 2019-05-21 RX ADMIN — TRASTUZUMAB 450 MG: 150 INJECTION, POWDER, LYOPHILIZED, FOR SOLUTION INTRAVENOUS at 09:27

## 2019-05-21 ASSESSMENT — MIFFLIN-ST. JEOR: SCORE: 1326.87

## 2019-05-21 ASSESSMENT — PAIN SCALES - GENERAL: PAINLEVEL: NO PAIN (0)

## 2019-05-21 NOTE — PATIENT INSTRUCTIONS
Contact Numbers    [facility] [place] [phone]  [facility] [place]:  [phone]    Call triage with chills and/or temperature greater than or equal to 100.5, uncontrolled nausea/vomiting, diarrhea, constipation, dizziness, shortness of breath, chest pain, bleeding, unexplained bruising, or any new/concerning symptoms, questions/concerns.     If you are having any concerning symptoms or wish to speak to a provider before your next infusion visit, please call your care coordinator or triage to notify them so we can adequately serve you.       After Hours: [phone]    If after hours, weekends, or holidays, call main hospital  and ask for Oncology doctor on call.       May 2019      Jakub Monday Tuesday Wednesday Thursday Friday Saturday                  1     2     3     4       5     6     7     8     9     10     11       12     13     14     15     16     17     18       19     20     21    [facility] DRAW   7:30 AM   (30 min.)    [facility] DRAW   [facility] Lab Draw    [facility] RETURN   7:45 AM   (135 min.)   Jossie Sparrow PA   [facility]    [facility] ONC INFUSION 60   9:00 AM   (60 min.)    ONCOLOGY INFUSION   [facility] 22     23     24     25       26     27     28     29     30 31 June 2019 Sunday Monday Tuesday Wednesday Thursday Friday Saturday                                 1       2     3     4     5     6     7     8       9     10     11    ECHO COMPLETE  11:30 AM   (60 min.)   [id number]   [facility]    [facility] DRAW   1:30 PM   (15 min.)    [facility]    [facility] RETURN   1:45 PM   (30 min.)   [facility]ar [facility]   2:30 PM   (60 min.)    ONCOLOGY INFUSION   [facility] 12     13     14     15       16     17     18     19     20     21     22       23     24     25     26     27      28     29       30                                                   Lab Results:  Recent Results (from the past 12 hour(s))   CBC with platelets differential    Collection Time: 05/21/19  8:05 AM   Result Value Ref Range    WBC 7.0 4.0 - 11.0 10e9/L    RBC Count 3.76 (L) 3.8 - 5.2 10e12/L    Hemoglobin 11.2 (L) 11.7 - 15.7 g/dL    Hematocrit 35.8 35.0 - 47.0 %    MCV 95 78 - 100 fl    MCH 29.8 26.5 - 33.0 pg    MCHC 31.3 (L) 31.5 - 36.5 g/dL    RDW 13.6 10.0 - 15.0 %    Platelet Count 175 150 - 450 10e9/L    Diff Method Automated Method     % Neutrophils 55.3 %    % Lymphocytes 34.0 %    % Monocytes 6.5 %    % Eosinophils 3.7 %    % Basophils 0.4 %    % Immature Granulocytes 0.1 %    Nucleated RBCs 0 0 /100    Absolute Neutrophil 3.9 1.6 - 8.3 10e9/L    Absolute Lymphocytes 2.4 0.8 - 5.3 10e9/L    Absolute Monocytes 0.5 0.0 - 1.3 10e9/L    Absolute Eosinophils 0.3 0.0 - 0.7 10e9/L    Absolute Basophils 0.0 0.0 - 0.2 10e9/L    Abs Immature Granulocytes 0.0 0 - 0.4 10e9/L    Absolute Nucleated RBC 0.0    Comprehensive metabolic panel    Collection Time: 05/21/19  8:05 AM   Result Value Ref Range    Sodium 145 (H) 133 - 144 mmol/L    Potassium 4.0 3.4 - 5.3 mmol/L    Chloride 112 (H) 94 - 109 mmol/L    Carbon Dioxide 27 20 - 32 mmol/L    Anion Gap 6 3 - 14 mmol/L    Glucose 86 70 - 99 mg/dL    Urea Nitrogen 14 7 - 30 mg/dL    Creatinine 0.75 0.52 - 1.04 mg/dL    GFR Estimate 85 >60 mL/min/[1.73_m2]    GFR Estimate If Black >90 >60 mL/min/[1.73_m2]    Calcium 8.4 (L) 8.5 - 10.1 mg/dL    Bilirubin Total 0.3 0.2 - 1.3 mg/dL    Albumin 3.1 (L) 3.4 - 5.0 g/dL    Protein Total 6.9 6.8 - 8.8 g/dL    Alkaline Phosphatase 38 (L) 40 - 150 U/L    ALT 24 0 - 50 U/L    AST 22 0 - 45 U/L

## 2019-05-21 NOTE — LETTER
5/21/2019    RE: Hoda Brush  [address] [facility] [address]     Oncology/Hematology Visit Note  May 21, 2019    Reason for Visit: follow up of ER positive, HER2 positive left metastatic breast cancer    History of Present Illness: Hoda Brush is a 64 year old female with ER positive, HER2 positive left metastatic breast cancer with bone mets.     TREATMENT HISTORY:  A. Initial diagnosis with metastatic breast cancer in [facility].  Neoadjuvant CAF x 6.   B. Left mastectomy. Left axillary node dissection.  C.  Radiation in 1 dose to R iliac region. g Gy.  C. Herceptin for 2 years taxane for a prescribed course then stopped, monthly zoledronic acid.  She had 2 years of Herceptin with tamoxifen added after chemotherapy.  D.  Tamoxifen alone and zoledronic acid every 3 months.  E.  [place]  We restarted Herceptin every 3 weeks and continued tamoxifen. Bone targeted agent changed to denosumab every 6 weeks.      She is from [facility], formerly from [facility], [place]  She lives with her daughter and is here for continuation of every 3 week Herceptin, which she receives along with tamoxifen.  Her tumor is ER positive, ID positive, HER-2 positive.  She had metastatic disease at the time of presentation with bone-only metastases by report.  She presented with metastatic disease in 02/2014.  Staging was initially bone-only metastases by report.  She did her staging in 02/2014 that showed that she had stage IV disease, T4N2M1 invasive ductal carcinoma of the left breast.  She had a right hip metastasis.  She underwent neoadjuvant CAF, left mastectomy, left axillary lymph node dissection and radiation.  She had radiation of the right iliac region where she had metastatic disease.  Pathology report from [facility] shows the tumor to be positive for ER in 75% of the cells, positive for ID in 40% of the cells, and the HER-2 was 3+ positive by immunohistochemistry.  The Ki-67 was  20%.  She had no evidence of nonbone metastatic disease on her PET/CT scan from 08/2017.    Restaging on 4/8/19 was stable.     Interval History:  Hoda is here for follow up today.  present. She is feeling overall well. She continues to see her dentist for ongoing issues. She denies any pain. She has not had any recurrence of episodes of chest pain. Denies any dizziness, dyspnea, pleuritic pain. No orthopnea, leg swelling. No LUE swelling. Appetite good. Tolerating tamoxifen. No vaginal bleeding. Taking calcium and vitamin D. No depression or anxiety. She is walking regularly with her  for exercise.    Review of Systems:  Patient denies any of the following except if noted above: fevers, chills, abdominal pain, nausea, vomiting, diarrhea, constipation, urinary concerns, headaches, cough.    Current Outpatient Medications   Medication Sig Dispense Refill     melatonin 3 MG tablet Take 1 tablet (3 mg) by mouth nightly as needed for sleep 30 tablet 11     order for DME Equipment being ordered: 2 Mastectomy bras and 1 prosthetheses. 2 Piece 0     order for DME L UE class 2 compression sleeve and gauntlet  Night garment  Bandaging supplies 1 each 1     tamoxifen (NOLVADEX) 20 MG tablet Take 1 tablet (20 mg) by mouth daily 90 tablet 3     VITAMIN D, CHOLECALCIFEROL, PO Take 1,000 Units by mouth daily         Physical Examination:  General: The patient is a pleasant female in no acute distress.  There were no vitals taken for this visit.  Wt Readings from Last 10 Encounters:   04/30/19 79.2 kg (174 lb 9.6 oz)   04/09/19 79.2 kg (174 lb 9.6 oz)   03/19/19 79.2 kg (174 lb 8 oz)   02/26/19 80 kg (176 lb 6.4 oz)   02/05/19 80.2 kg (176 lb 14.4 oz)   01/15/19 79.1 kg (174 lb 4.8 oz)   01/08/19 79.9 kg (176 lb 3.2 oz)   12/24/18 80.2 kg (176 lb 12.8 oz)   12/05/18 81.8 kg (180 lb 4.8 oz)   11/13/18 82.5 kg (181 lb 12.8 oz)     HEENT: EOMI, PERRL. Sclerae are anicteric. Oral mucosa is pink and moist with no  lesions or thrush.   Lymph: Neck is supple with no lymphadenopathy in the cervical or supraclavicular areas.   Heart: Regular rate and rhythm.   Lungs: Clear to auscultation bilaterally.   Abdomen: Bowel sounds present, soft, nontender with no palpable hepatosplenomegaly or masses.   Extremities: No lower extremity edema noted bilaterally.   Neuro: Cranial nerves II through XII are grossly intact.  Skin: No rashes, petechiae, or bruising noted on exposed skin.    Laboratory Data:  Results for NATASHA LOUIS (MRN [id number]) as of 5/21/2019 11:18   Ref. Range 5/21/2019 08:05   Sodium Latest Ref Range: 133 - 144 mmol/L 145 (H)   Potassium Latest Ref Range: 3.4 - 5.3 mmol/L 4.0   Chloride Latest Ref Range: 94 - 109 mmol/L 112 (H)   Carbon Dioxide Latest Ref Range: 20 - 32 mmol/L 27   Urea Nitrogen Latest Ref Range: 7 - 30 mg/dL 14   Creatinine Latest Ref Range: 0.52 - 1.04 mg/dL 0.75   GFR Estimate Latest Ref Range: >60 mL/min/1.73_m2 85   GFR Estimate If Black Latest Ref Range: >60 mL/min/1.73_m2 >90   Calcium Latest Ref Range: 8.5 - 10.1 mg/dL 8.4 (L)   Anion Gap Latest Ref Range: 3 - 14 mmol/L 6   Albumin Latest Ref Range: 3.4 - 5.0 g/dL 3.1 (L)   Protein Total Latest Ref Range: 6.8 - 8.8 g/dL 6.9   Bilirubin Total Latest Ref Range: 0.2 - 1.3 mg/dL 0.3   Alkaline Phosphatase Latest Ref Range: 40 - 150 U/L 38 (L)   ALT Latest Ref Range: 0 - 50 U/L 24   AST Latest Ref Range: 0 - 45 U/L 22   CA 27-29 Latest Ref Range: 0 - 39 U/mL 7   Glucose Latest Ref Range: 70 - 99 mg/dL 86   WBC Latest Ref Range: 4.0 - 11.0 10e9/L 7.0   Hemoglobin Latest Ref Range: 11.7 - 15.7 g/dL 11.2 (L)   Hematocrit Latest Ref Range: 35.0 - 47.0 % 35.8   Platelet Count Latest Ref Range: 150 - 450 10e9/L 175   RBC Count Latest Ref Range: 3.8 - 5.2 10e12/L 3.76 (L)   MCV Latest Ref Range: 78 - 100 fl 95   MCH Latest Ref Range: 26.5 - 33.0 pg 29.8   MCHC Latest Ref Range: 31.5 - 36.5 g/dL 31.3 (L)   RDW Latest Ref Range: 10.0 - 15.0 % 13.6    Diff Method Unknown Automated Method   % Neutrophils Latest Units: % 55.3   % Lymphocytes Latest Units: % 34.0   % Monocytes Latest Units: % 6.5   % Eosinophils Latest Units: % 3.7   % Basophils Latest Units: % 0.4   % Immature Granulocytes Latest Units: % 0.1   Nucleated RBCs Latest Ref Range: 0 /100 0   Absolute Neutrophil Latest Ref Range: 1.6 - 8.3 10e9/L 3.9   Absolute Lymphocytes Latest Ref Range: 0.8 - 5.3 10e9/L 2.4   Absolute Monocytes Latest Ref Range: 0.0 - 1.3 10e9/L 0.5   Absolute Eosinophils Latest Ref Range: 0.0 - 0.7 10e9/L 0.3   Absolute Basophils Latest Ref Range: 0.0 - 0.2 10e9/L 0.0   Abs Immature Granulocytes Latest Ref Range: 0 - 0.4 10e9/L 0.0   Absolute Nucleated RBC Unknown 0.0       Results for NATASHA LOUIS (MRN [id number]) as of 5/21/2019 11:18   Ref. Range 4/9/2019 14:32 4/30/2019 07:25 5/21/2019 08:05   CA 27-29 Latest Ref Range: 0 - 39 U/mL 12 8 7   Results for NATASHA LOUIS (MRN [id number]) as of 5/21/2019 11:18   Ref. Range 4/9/2019 14:32 4/30/2019 07:25 5/21/2019 08:05   CEA Latest Ref Range: 0 - 2.5 ug/L 0.9 0.8 0.6     Assessment and Plan:    1. Metastatic breast cancer, ER, DC, HER2+ positive: Was last treated in [facility] with Herceptin. We have continued Herceptin every 3 weeks as well as daily tamoxifen. CT CAP on 4/8/19 with stable disease. Echocardiogram on 3/19 with EF 60-65% and normal LV function. Tolerating well. Tumor markers stable  --Will proceed with Herceptin. Continue Tamoxifen.   --Next echo due ~6/11    2. Bone metastasis: On xgeva every 6 weeks. On calcium + vitamin D. Calcium corrects to WNL. Cleared by dentist to proceed. Last dose 4/9. Next dose due 7/2    3. Atypical chest pain: this has not recurred. She should see cardiology and have stress test if this recurs.       4. LUE lymphedema: She completed lymphedema treatment and has a sleeve to use now.      5. Insomnia: She feels melatonin is working. No longer taking remeron as was too  sedating.     Follow up with labs, echo, Herceptin and Dr. Aguiar on 6/11. Follow up 7/2 with myself. Will also be due for Xgeva. Next CT 7/22 with labs, Dr. Aguiar and Herceptin on 7/23.    Jossie Sparrow PA-C  [facility]  [address] [place] [address]  [phone]

## 2019-05-21 NOTE — NURSING NOTE
"Oncology Rooming Note    May 21, 2019 8:17 AM   Hoda Brush is a 64 year old female who presents for:    Chief Complaint   Patient presents with     Port Draw     Labs drawn via PORT by RN in lab. Line floushed with saline and heparin.  VS taken.       Oncology Clinic Visit     Return visit related to Breast Cancer     Initial Vitals: /80 (BP Location: Right arm, Patient Position: Sitting, Cuff Size: Adult Regular)   Pulse 64   Temp 97.3  F (36.3  C) (Oral)   Resp 18   Ht 1.6 m (5' 2.99\")   Wt 80.3 kg (177 lb)   SpO2 98%   BMI 31.36 kg/m   Estimated body mass index is 31.36 kg/m  as calculated from the following:    Height as of this encounter: 1.6 m (5' 2.99\").    Weight as of this encounter: 80.3 kg (177 lb). Body surface area is 1.89 meters squared.  No Pain (0) Comment: Data Unavailable   No LMP recorded. Patient is postmenopausal.  Allergies reviewed: Yes  Medications reviewed: Yes    Medications: Medication refills not needed today.  Pharmacy name entered into [employer]: [facility] [id number] [place] [address] - [address] AT  [address] [place] & [facility]    Clinical concerns: No new concerns. Provider was notified.      Maria Fernanda Zhang LPN            "

## 2019-05-21 NOTE — PROGRESS NOTES
Oncology/Hematology Visit Note  May 21, 2019    Reason for Visit: follow up of ER positive, HER2 positive left metastatic breast cancer    History of Present Illness: Hoda Brush is a 64 year old female with ER positive, HER2 positive left metastatic breast cancer with bone mets.     TREATMENT HISTORY:  A. Initial diagnosis with metastatic breast cancer in [facility].  Neoadjuvant CAF x 6.   B. Left mastectomy. Left axillary node dissection.  C.  Radiation in 1 dose to R iliac region. g Gy.  C. Herceptin for 2 years taxane for a prescribed course then stopped, monthly zoledronic acid.  She had 2 years of Herceptin with tamoxifen added after chemotherapy.  D.  Tamoxifen alone and zoledronic acid every 3 months.  E.  [place]  We restarted Herceptin every 3 weeks and continued tamoxifen. Bone targeted agent changed to denosumab every 6 weeks.      She is from [facility], formerly from [facility], and [place]  She lives with her daughter and is here for continuation of every 3 week Herceptin, which she receives along with tamoxifen.  Her tumor is ER positive, TX positive, HER-2 positive.  She had metastatic disease at the time of presentation with bone-only metastases by report.  She presented with metastatic disease in 02/2014.  Staging was initially bone-only metastases by report.  She did her staging in 02/2014 that showed that she had stage IV disease, T4N2M1 invasive ductal carcinoma of the left breast.  She had a right hip metastasis.  She underwent neoadjuvant CAF, left mastectomy, left axillary lymph node dissection and radiation.  She had radiation of the right iliac region where she had metastatic disease.  Pathology report from [facility] shows the tumor to be positive for ER in 75% of the cells, positive for TX in 40% of the cells, and the HER-2 was 3+ positive by immunohistochemistry.  The Ki-67 was 20%.  She had no evidence of nonbone metastatic disease on her PET/CT scan from  08/2017.    Restaging on 4/8/19 was stable.     Interval History:  Hoda is here for follow up today.  present. She is feeling overall well. She continues to see her dentist for ongoing issues. She denies any pain. She has not had any recurrence of episodes of chest pain. Denies any dizziness, dyspnea, pleuritic pain. No orthopnea, leg swelling. No LUE swelling. Appetite good. Tolerating tamoxifen. No vaginal bleeding. Taking calcium and vitamin D. No depression or anxiety. She is walking regularly with her  for exercise.    Review of Systems:  Patient denies any of the following except if noted above: fevers, chills, abdominal pain, nausea, vomiting, diarrhea, constipation, urinary concerns, headaches, cough.    Current Outpatient Medications   Medication Sig Dispense Refill     melatonin 3 MG tablet Take 1 tablet (3 mg) by mouth nightly as needed for sleep 30 tablet 11     order for DME Equipment being ordered: 2 Mastectomy bras and 1 prosthetheses. 2 Piece 0     order for DME L UE class 2 compression sleeve and gauntlet  Night garment  Bandaging supplies 1 each 1     tamoxifen (NOLVADEX) 20 MG tablet Take 1 tablet (20 mg) by mouth daily 90 tablet 3     VITAMIN D, CHOLECALCIFEROL, PO Take 1,000 Units by mouth daily         Physical Examination:  General: The patient is a pleasant female in no acute distress.  There were no vitals taken for this visit.  Wt Readings from Last 10 Encounters:   04/30/19 79.2 kg (174 lb 9.6 oz)   04/09/19 79.2 kg (174 lb 9.6 oz)   03/19/19 79.2 kg (174 lb 8 oz)   02/26/19 80 kg (176 lb 6.4 oz)   02/05/19 80.2 kg (176 lb 14.4 oz)   01/15/19 79.1 kg (174 lb 4.8 oz)   01/08/19 79.9 kg (176 lb 3.2 oz)   12/24/18 80.2 kg (176 lb 12.8 oz)   12/05/18 81.8 kg (180 lb 4.8 oz)   11/13/18 82.5 kg (181 lb 12.8 oz)     HEENT: EOMI, PERRL. Sclerae are anicteric. Oral mucosa is pink and moist with no lesions or thrush.   Lymph: Neck is supple with no lymphadenopathy in the cervical  or supraclavicular areas.   Heart: Regular rate and rhythm.   Lungs: Clear to auscultation bilaterally.   Abdomen: Bowel sounds present, soft, nontender with no palpable hepatosplenomegaly or masses.   Extremities: No lower extremity edema noted bilaterally.   Neuro: Cranial nerves II through XII are grossly intact.  Skin: No rashes, petechiae, or bruising noted on exposed skin.    Laboratory Data:  Results for NATASHA LOUIS (MRN [id number]) as of 5/21/2019 11:18   Ref. Range 5/21/2019 08:05   Sodium Latest Ref Range: 133 - 144 mmol/L 145 (H)   Potassium Latest Ref Range: 3.4 - 5.3 mmol/L 4.0   Chloride Latest Ref Range: 94 - 109 mmol/L 112 (H)   Carbon Dioxide Latest Ref Range: 20 - 32 mmol/L 27   Urea Nitrogen Latest Ref Range: 7 - 30 mg/dL 14   Creatinine Latest Ref Range: 0.52 - 1.04 mg/dL 0.75   GFR Estimate Latest Ref Range: >60 mL/min/1.73_m2 85   GFR Estimate If Black Latest Ref Range: >60 mL/min/1.73_m2 >90   Calcium Latest Ref Range: 8.5 - 10.1 mg/dL 8.4 (L)   Anion Gap Latest Ref Range: 3 - 14 mmol/L 6   Albumin Latest Ref Range: 3.4 - 5.0 g/dL 3.1 (L)   Protein Total Latest Ref Range: 6.8 - 8.8 g/dL 6.9   Bilirubin Total Latest Ref Range: 0.2 - 1.3 mg/dL 0.3   Alkaline Phosphatase Latest Ref Range: 40 - 150 U/L 38 (L)   ALT Latest Ref Range: 0 - 50 U/L 24   AST Latest Ref Range: 0 - 45 U/L 22   CA 27-29 Latest Ref Range: 0 - 39 U/mL 7   Glucose Latest Ref Range: 70 - 99 mg/dL 86   WBC Latest Ref Range: 4.0 - 11.0 10e9/L 7.0   Hemoglobin Latest Ref Range: 11.7 - 15.7 g/dL 11.2 (L)   Hematocrit Latest Ref Range: 35.0 - 47.0 % 35.8   Platelet Count Latest Ref Range: 150 - 450 10e9/L 175   RBC Count Latest Ref Range: 3.8 - 5.2 10e12/L 3.76 (L)   MCV Latest Ref Range: 78 - 100 fl 95   MCH Latest Ref Range: 26.5 - 33.0 pg 29.8   MCHC Latest Ref Range: 31.5 - 36.5 g/dL 31.3 (L)   RDW Latest Ref Range: 10.0 - 15.0 % 13.6   Diff Method Unknown Automated Method   % Neutrophils Latest Units: % 55.3   %  Lymphocytes Latest Units: % 34.0   % Monocytes Latest Units: % 6.5   % Eosinophils Latest Units: % 3.7   % Basophils Latest Units: % 0.4   % Immature Granulocytes Latest Units: % 0.1   Nucleated RBCs Latest Ref Range: 0 /100 0   Absolute Neutrophil Latest Ref Range: 1.6 - 8.3 10e9/L 3.9   Absolute Lymphocytes Latest Ref Range: 0.8 - 5.3 10e9/L 2.4   Absolute Monocytes Latest Ref Range: 0.0 - 1.3 10e9/L 0.5   Absolute Eosinophils Latest Ref Range: 0.0 - 0.7 10e9/L 0.3   Absolute Basophils Latest Ref Range: 0.0 - 0.2 10e9/L 0.0   Abs Immature Granulocytes Latest Ref Range: 0 - 0.4 10e9/L 0.0   Absolute Nucleated RBC Unknown 0.0       Results for NATASHA LOUIS (MRN [id number]) as of 5/21/2019 11:18   Ref. Range 4/9/2019 14:32 4/30/2019 07:25 5/21/2019 08:05   CA 27-29 Latest Ref Range: 0 - 39 U/mL 12 8 7   Results for NATASHA LOUIS (MRN [id number]) as of 5/21/2019 11:18   Ref. Range 4/9/2019 14:32 4/30/2019 07:25 5/21/2019 08:05   CEA Latest Ref Range: 0 - 2.5 ug/L 0.9 0.8 0.6     Assessment and Plan:    1. Metastatic breast cancer, ER, MO, HER2+ positive: Was last treated in [facility] with Herceptin. We have continued Herceptin every 3 weeks as well as daily tamoxifen. CT CAP on 4/8/19 with stable disease. Echocardiogram on 3/19 with EF 60-65% and normal LV function. Tolerating well. Tumor markers stable  --Will proceed with Herceptin. Continue Tamoxifen.   --Next echo due ~6/11    2. Bone metastasis: On xgeva every 6 weeks. On calcium + vitamin D. Calcium corrects to WNL. Cleared by dentist to proceed. Last dose 4/9. Next dose due 7/2    3. Atypical chest pain: this has not recurred. She should see cardiology and have stress test if this recurs.       4. LUE lymphedema: She completed lymphedema treatment and has a sleeve to use now.      5. Insomnia: She feels melatonin is working. No longer taking remeron as was too sedating.     Follow up with labs, echo, Herceptin and Dr. Aguiar on 6/11. Follow up  7/2 with myself. Will also be due for Xgeva. Next CT 7/22 with labs, Dr. Aguiar and Herceptin on 7/23.    Jossie Sparrow PA-C  [facility]  [address] [place] [id number] [phone]

## 2019-05-21 NOTE — PROGRESS NOTES
Infusion Nursing Note:  Hoda Brush presents today for Cycle 30, day 1 Herceptin.    Patient seen by provider today: Yes: SEMAJ Escobar  Pt arrived to clinic with  who remained present throughout infusion.    Note: Patient presents to clinic today feeling well with no questions.  Pt did not request or require any intervention for pain today.    Intravenous Access:  Implanted Port.    Treatment Conditions:  Lab Results   Component Value Date    HGB 11.2 05/21/2019     Lab Results   Component Value Date    WBC 7.0 05/21/2019      Lab Results   Component Value Date    ANEU 3.9 05/21/2019     Lab Results   Component Value Date     05/21/2019      Lab Results   Component Value Date     05/21/2019                   Lab Results   Component Value Date    POTASSIUM 4.0 05/21/2019           No results found for: MAG         Lab Results   Component Value Date    CR 0.75 05/21/2019                   Lab Results   Component Value Date    TAYLER 8.4 05/21/2019                Lab Results   Component Value Date    BILITOTAL 0.3 05/21/2019           Lab Results   Component Value Date    ALBUMIN 3.1 05/21/2019                    Lab Results   Component Value Date    ALT 24 05/21/2019           Lab Results   Component Value Date    AST 22 05/21/2019     Results reviewed, labs MET treatment parameters, ok to proceed with treatment.  ECHO/MUGA completed 3/19/2019 EF 60-65%    Post Infusion Assessment:  Patient tolerated infusion without incident.  Blood return noted pre and post infusion.  Site patent and intact, free from redness, edema or discomfort.  No evidence of extravasations.  Access discontinued per protocol.    Discharge Plan:   Patient declined prescription refills.  Discharge instructions reviewed with: Patient.  Patient and/or family verbalized understanding of discharge instructions and all questions answered.  AVS to patient via [employer].  Patient will return 6/11/2019 for next  appointment.   Patient discharged in stable condition accompanied by: .  Departure Mode: Ambulatory.    Steffi Oh RN

## 2019-05-28 ENCOUNTER — OFFICE VISIT (OUTPATIENT)
Dept: PODIATRY | Facility: CLINIC | Age: 64
End: 2019-05-28
Payer: COMMERCIAL

## 2019-05-28 VITALS
WEIGHT: 177 LBS | HEART RATE: 92 BPM | SYSTOLIC BLOOD PRESSURE: 122 MMHG | BODY MASS INDEX: 31.36 KG/M2 | DIASTOLIC BLOOD PRESSURE: 80 MMHG

## 2019-05-28 DIAGNOSIS — L60.0 INGROWING NAIL: Primary | ICD-10-CM

## 2019-05-28 PROCEDURE — 99203 OFFICE O/P NEW LOW 30 MIN: CPT | Mod: 25 | Performed by: PODIATRIST

## 2019-05-28 PROCEDURE — 11730 AVULSION NAIL PLATE SIMPLE 1: CPT | Mod: T5 | Performed by: PODIATRIST

## 2019-05-28 NOTE — PATIENT INSTRUCTIONS
We wish you continued good healing. If you have any questions or concerns, please do not hesitate to contact us at [phone]    Please remember to call and schedule a follow up appointment if one was recommended at your earliest convenience.   PODIATRY CLINIC HOURS  TELEPHONE NUMBER    Dr. Anthony Young D.P.[facility]    Clinics:  [facility]    [facility]   Tuesday 1PM-6PM  [place]  Wednesday 7AM-2PM  [facility]  Thursday 10AM-6PM  [place]  Friday 7AM-3PM  [place]  Specialty schedulers:   [phone] to make an appointment with any Specialty Provider.        Urgent Care locations:    [facility] Monday-Friday 5 pm - 9 pm. Saturday-Sunday 9 am -5pm    Monday-Friday 11 am - 9 pm Saturday 9 am - 5 pm     Monday-Sunday 12 noon-8PM [phone]     If you need a medication refill, please contact us you may need lab work and/or a follow up visit prior to your refill (i.e. Antifungal medications).    [facility] (secure e-mail communication and access to your chart) to send a message or to make an appointment.    If MRI needed please call [place] [facility] at [phone]        Weight management plan: Patient was referred to their PCP to discuss a diet and exercise plan.

## 2019-05-28 NOTE — PROGRESS NOTES
Subjective:    Pt is seen today as a new pt referral w/ the c/c of a painful ingrown right great nail lateral border.  This has been problematic for 5 day(s).  positivehistory of drainage from the site. This is slowly getting worse.  Aggravated by activity and relieved by rest.  Has tried soaking which has not helped.   denies history of trauma to the area.  Denies fever and chill, denies numbness and tingling, denies erythema on dorsum of foot.  Patient has breast cancer with mets.  On chemotherapy.      ROS:  A 10-point review of systems was performed and is positive for that noted in the HPI and as seen below.  All other areas are negative.     Past Medical History:   Diagnosis Date     Breast cancer metastasized to bone (H)        Past Surgical History:   Procedure Laterality Date     APPENDECTOMY       COLONOSCOPY N/A 2/1/2018    Procedure: COLONOSCOPY;  Colonoscopy;  Surgeon: Phillip Rowe MD;  [facility]     ESOPHAGOSCOPY, GASTROSCOPY, DUODENOSCOPY (EGD), COMBINED N/A 2/16/2018    Procedure: COMBINED ESOPHAGOSCOPY, GASTROSCOPY, DUODENOSCOPY (EGD), BIOPSY SINGLE OR MULTIPLE;;  Surgeon: Paty Herman MD;  [facility]     INSERT PORT VASCULAR ACCESS Right 9/15/2017    Procedure: INSERT PORT VASCULAR ACCESS;  Central venous chest port placement, right;  Surgeon: Dmitriy Hernandez PA-C;  [facility]     MASTECTOMY Left 06/27/2014    and lymph node resection     MASTECTOMY SIMPLE BILATERAL      Mastectomy 06/27/2014       Family History   Problem Relation Age of Onset     Breast Cancer Mother 45     Lung Cancer Father         smoker     Breast Cancer Sister 61       Social History     Tobacco Use     Smoking status: Never Smoker     Smokeless tobacco: Never Used   Substance Use Topics     Alcohol use: No         Current Outpatient Medications:      melatonin 3 MG tablet, Take 1 tablet (3 mg) by mouth nightly as needed for sleep, Disp: 30 tablet, Rfl: 11     order for DME,  Equipment being ordered: 2 Mastectomy bras and 1 prosthetheses., Disp: 2 Piece, Rfl: 0     order for DME, L UE class 2 compression sleeve and gauntlet Night garment Bandaging supplies, Disp: 1 each, Rfl: 1     tamoxifen (NOLVADEX) 20 MG tablet, Take 1 tablet (20 mg) by mouth daily, Disp: 90 tablet, Rfl: 3     VITAMIN D, CHOLECALCIFEROL, PO, Take 1,000 Units by mouth daily, Disp: , Rfl:      No Known Allergies    /80 (BP Location: Right arm)   Pulse 92   Wt 80.3 kg (177 lb)   BMI 31.36 kg/m  .      Constitutional/ general:  Pt is in no apparent distress, appears well-nourished.  Cooperative with history and physical exam.     Psych:  The patient answered questions appropriately.  Normal affect.  Seems to have reasonable expectations, in terms of treatment.     Eyes:  Visual scanning/ tracking without deficit.     Ears:  Response to auditory stimuli is normal.  No  hearing aid devices.  Auricles in proper alignment.     Lymphatic:  Popliteal lymph nodes not enlarged.     Lungs:  Non labored breathing, non labored speech. No cough.  No audible wheezing. Even, quiet breathing.       Vascular:  Pedal pulses are palpable bilaterally for both the DP and PT arteries.  CFT < 3 sec.  No ankle varicosities or edema.  Pedal hair growth noted.     Neuro:  Alert and oriented x 3. Coordinated gait.  Light touch sensation is intact to the L4, L5, S1 distributions. No obvious deficits.  No evidence of neurological-based weakness, spasticity, or contracture in the lower extremities.     Derm: Normal texture and turgor.  No ecchymosis, or cyanosis.  Hair growth noted.        Musculoskeletal:     Patient is ambulatory without an assistive device or brace.  No gross deformities.  Normal arch with weightbearing.  No forefoot or rear foot deformities noted.  MS 5/5 all compartments.  Normal ROM all fore foot and rearfoot joints.  No equinus.  right great toe nail lateral border shows soft tissue impingement with localized  erythema.   negative active drainage/purulence at this time.  No sinus tracts.  No nailbed masses or exostosis.  No pain with range of motion of IPJ or MTPJ.  No ascending cellulitis.    ASSESSMENT:    Onychocryptosis with paronychia right toe.    Discussed etiology and treatment options in detail w/ the pt.  The potential causes and nature of an ingrown toenail were discussed with the patient.  We reviewed the natural history/prognosis of the condition and potential risks if no treatment is provided.      Treatment options discussed included conservative management (oral antibiotics, soaking of foot, adequate width shoes)  as well as surgical management (partial or total nail removal).  The pros and cons of both forms of treatment were reviewed.  Handout given to patient.      After thorough discussion and answering all questions, the patient elected to have nail avulsion.  Obtained consent, used 3cc of 1% lidocaine plain to block right first toe.  Sterile prep, then avulsed the affected border(s).  No evidence of deep abscess noted.  Pt tolerated procedure well.  Sterile bandage placed, gave wound care instruction.  Return to clinic prn.    Anthony Young DPM, FACFAS

## 2019-05-28 NOTE — LETTER
5/28/2019         RE: Hoda Brush  [address] [place] [facility] [address]        Dear Colleague,    Thank you for referring your patient, Hoda Brush, to the [facility]. Please see a copy of my visit note below.    Subjective:    Pt is seen today as a new pt referral w/ the c/c of a painful ingrown right great nail lateral border.  This has been problematic for 5 day(s).  positivehistory of drainage from the site. This is slowly getting worse.  Aggravated by activity and relieved by rest.  Has tried soaking which has not helped.   denies history of trauma to the area.  Denies fever and chill, denies numbness and tingling, denies erythema on dorsum of foot.  Patient has breast cancer with mets.  On chemotherapy.      ROS:  A 10-point review of systems was performed and is positive for that noted in the HPI and as seen below.  All other areas are negative.     Past Medical History:   Diagnosis Date     Breast cancer metastasized to bone (H)        Past Surgical History:   Procedure Laterality Date     APPENDECTOMY       COLONOSCOPY N/A 2/1/2018    Procedure: COLONOSCOPY;  Colonoscopy;  Surgeon: Phillip Rowe MD;  Location:  GI     ESOPHAGOSCOPY, GASTROSCOPY, DUODENOSCOPY (EGD), COMBINED N/A 2/16/2018    Procedure: COMBINED ESOPHAGOSCOPY, GASTROSCOPY, DUODENOSCOPY (EGD), BIOPSY SINGLE OR MULTIPLE;;  Surgeon: Paty Herman MD;  Location:  GI     INSERT PORT VASCULAR ACCESS Right 9/15/2017    Procedure: INSERT PORT VASCULAR ACCESS;  Central venous chest port placement, right;  Surgeon: Dmitriy Hernandez PA-C;  [facility]     MASTECTOMY Left 06/27/2014    and lymph node resection     MASTECTOMY SIMPLE BILATERAL      Mastectomy 06/27/2014       Family History   Problem Relation Age of Onset     Breast Cancer Mother 45     Lung Cancer Father         smoker     Breast Cancer Sister 61       Social History     Tobacco Use     Smoking status:  Never Smoker     Smokeless tobacco: Never Used   Substance Use Topics     Alcohol use: No         Current Outpatient Medications:      melatonin 3 MG tablet, Take 1 tablet (3 mg) by mouth nightly as needed for sleep, Disp: 30 tablet, Rfl: 11     order for DME, Equipment being ordered: 2 Mastectomy bras and 1 prosthetheses., Disp: 2 Piece, Rfl: 0     order for DME, L UE class 2 compression sleeve and gauntlet Night garment Bandaging supplies, Disp: 1 each, Rfl: 1     tamoxifen (NOLVADEX) 20 MG tablet, Take 1 tablet (20 mg) by mouth daily, Disp: 90 tablet, Rfl: 3     VITAMIN D, CHOLECALCIFEROL, PO, Take 1,000 Units by mouth daily, Disp: , Rfl:      No Known Allergies    /80 (BP Location: Right arm)   Pulse 92   Wt 80.3 kg (177 lb)   BMI 31.36 kg/m   .      Constitutional/ general:  Pt is in no apparent distress, appears well-nourished.  Cooperative with history and physical exam.     Psych:  The patient answered questions appropriately.  Normal affect.  Seems to have reasonable expectations, in terms of treatment.     Eyes:  Visual scanning/ tracking without deficit.     Ears:  Response to auditory stimuli is normal.  No  hearing aid devices.  Auricles in proper alignment.     Lymphatic:  Popliteal lymph nodes not enlarged.     Lungs:  Non labored breathing, non labored speech. No cough.  No audible wheezing. Even, quiet breathing.       Vascular:  Pedal pulses are palpable bilaterally for both the DP and PT arteries.  CFT < 3 sec.  No ankle varicosities or edema.  Pedal hair growth noted.     Neuro:  Alert and oriented x 3. Coordinated gait.  Light touch sensation is intact to the L4, L5, S1 distributions. No obvious deficits.  No evidence of neurological-based weakness, spasticity, or contracture in the lower extremities.     Derm: Normal texture and turgor.  No ecchymosis, or cyanosis.  Hair growth noted.        Musculoskeletal:     Patient is ambulatory without an assistive device or brace.  No gross  deformities.  Normal arch with weightbearing.  No forefoot or rear foot deformities noted.  MS 5/5 all compartments.  Normal ROM all fore foot and rearfoot joints.  No equinus.  right great toe nail lateral border shows soft tissue impingement with localized erythema.   negative active drainage/purulence at this time.  No sinus tracts.  No nailbed masses or exostosis.  No pain with range of motion of IPJ or MTPJ.  No ascending cellulitis.    ASSESSMENT:    Onychocryptosis with paronychia right toe.    Discussed etiology and treatment options in detail w/ the pt.  The potential causes and nature of an ingrown toenail were discussed with the patient.  We reviewed the natural history/prognosis of the condition and potential risks if no treatment is provided.      Treatment options discussed included conservative management (oral antibiotics, soaking of foot, adequate width shoes)  as well as surgical management (partial or total nail removal).  The pros and cons of both forms of treatment were reviewed.  Handout given to patient.      After thorough discussion and answering all questions, the patient elected to have nail avulsion.  Obtained consent, used 3cc of 1% lidocaine plain to block right first toe.  Sterile prep, then avulsed the affected border(s).  No evidence of deep abscess noted.  Pt tolerated procedure well.  Sterile bandage placed, gave wound care instruction.  Return to clinic prn.    Anthony Young DPM, FACFAS      Again, thank you for allowing me to participate in the care of your patient.        Sincerely,        Anthony Young DPM

## 2019-06-10 NOTE — PROGRESS NOTES
Hoda is seen with a Belgian . The patient is a refugee from [facility] and is here for continuation of care for her metastatic ER+HER2- breast cancer.  She is a [employer] refugee from [facility] and was seen in clinic with her daughter.  The only data we have from [facility] is an English translation of her records.       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  On 02/11/2014, she was seen by an oncologist.  The clinical staging of T4b N2 M1 was noted.   Her breast cancer was on the left side. There was no right breast cancer, confirmed by the patient and her daughter, correcting a possible error in the translated records.  ER was positive in 100% of the cells, VA at 14% and HER2 was 3+ positive.  It appears that this histopathologic information is on the mastectomy specimen. She underwent an MRI for staging of presumptive bone metastases which was performed 03/02/2014.  There were skeletal metastases in the thoracic vertebrae at 12, L1, L3, L5 and S1 vertebral body, ranging in size from 0.7-3.0 cm in size.  Ischial and right femur were also involved, as well as a large iliac mass measuring about 15 cm in the uterus. 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   Radiation Oncology consultation was performed 03/11/2014, and she was given radiation in 1 dose of 6 Gy to the right iliac lesion.        With the initial diagnosis, she initiated treatment with 6 cycles of CAF neoadjuvant chemotherapy 02/14, 07/07, 03/28, 04/18/14, 05/08 and 05/29/14. She also received monthly zoledronic acid.  She then underwent a modified left mastectomy of Palacios type and left lymphadenoectomy on 06/27/2014.   Pathologic examination showed number at [facility] was [id number] showed infiltrative grade 2 ductal cancer with 6 level 1 metastatic lympFollow up with Jossie for visits and trastuzumab on 2-5, 2-26, 3-19 with CBC, CMP.  Echocardiogram on  3-19.  Follow up with me 4-9 with CBC, CMP, CA27.29 and CEA and with CT CAP on 4-8.h nodes and 3 level 2 metastatic lymph nodes.  The differentiation was intermediate.  The tumor was ER positive 70% of the cells, VT positive in 40% of the cells and HER2 was 3+ by immunohistochemistry and the Ki-67 labeling index was 20%. Her staging after surgery was stage IV, pT4b N2 M1.  I don't see a biopsy of the skeletal metastases.  She then had continuation with chemotherapy with 4 cycles of Herceptin and taxane with monthly zoledronic acid.  Tamoxifen was initiated.  She then had two years of Herceptin and a decision was made not to continue further Herceptin.  She continued on zoledronic acid every 3 months. She was clinically stable. She then moved to the [place] new refugee from [facility].  She arrived last month, established [place] residency and now seeks further oncology care.       TREATMENT HISTORY:  A. Initial diagnosis with metastatic breast cancer in [facility].  Neoadjuvant CAF x 6.   B. Left mastectomy. Left axillary node dissection.  C.  Radiation in 1 dose to R iliac region.  C. Herceptin for 2 years taxane for a prescribed course then stopped, monthly zoledronic acid.  She had 2 years of Herceptin with tamoxifen added after chemotherapy.  D.  Tamoxifen alone and zoledronic acid every 3 months.  E.  [place]  We restarted Herceptin every 3 weeks and continued tamoxifen. Bone targeted agent changed to denosumab every 9 weeks.     FOLLOWUP NOTE   HISTORY OF PRESENT ILLNESS:  Hoda returns to clinic for routine followup and continuation of tamoxifen and Herceptin for metastatic ER-positive, HER2-postive breast cancer.  She has no pain, no fatigue, no depression or anxiety.      REVIEW OF SYSTEMS:  A 10-point review of systems is entirely negative.      Her uterus is in place.  She has had no vaginal bleeding.  She continues on tamoxifen and Herceptin.  Her ejection fraction measured recently was 62%.       PHYSICAL EXAMINATION:   VITAL SIGNS:  Blood pressure was 104/59, temperature 98, pulse 73, respirations 16, O2 sat 96% on room air, weight 78.9 kg and height 1.6 meters.   GENERAL:  Hoda appeared generally well.  She has no alopecia.   HEENT:  Oropharynx is without lesions.   LYMPH:  There is no palpable cervical, supraclavicular, subclavicular or axillary lymphadenopathy.   LUNGS:  Clear to percussion and auscultation.   HEART:  Regular rate and rhythm, S1, S2.   ABDOMEN:  Soft and nontender, without hepatosplenomegaly.   EXTREMITIES:  Without edema.   PSYCHIATRIC:  Mood and affect were normal.      LABORATORY DATA:     CBC:  WBC 8.1, hemoglobin 11.3 and platelets 186,000.     CMP was normal.  CEA and CA 27-29 are both in the normal range.      ASSESSMENT AND PLAN:   1.  Hoda Brush is a 64-year-old woman with a history of ER-positive, AR-positive, HER2-positive breast cancer.  She is from [facility], formerly from [facility], [place].  She is here for continuation of every-3-week Herceptin, which she receives along with tamoxifen daily.  The tumor is ER-positive, AR-positive and HER2-positive.  She had metastatic disease at the time of presentation with bone-only metastases by report.  She presented with metastatic disease in 02/2014.  Staging initially showed bone-only metastases.  She had a right hip metastasis.  She underwent neoadjuvant CAF mastectomy, left axillary lymph node dissection and radiation to the right hip including the right iliac region where she had metastatic disease.  She continues on tamoxifen and every-3-week trastuzumab and has had no evidence of disease progression over the past 2-1/2 years.   2.  Discussion of hormonal therapy.  I discussed that it would be reasonable, after 5 years of tamoxifen are completed in 09/2019, that we change her hormonal therapy to an aromatase inhibitor.  This would decrease the risk of pulmonary embolus and uterine  cancer.  We would continue the letrozole indefinitely.  We may monitor her bone density given her oligometastatic bone disease which has been stable for at least 5 years.   3.  Discussion of bone health.  She will continue with calcium and vitamin D.  We will give Xgeva every 84 days or every fourth trastuzumab treatment.   4.  Followup.  We will see Hoda in followup with Jossie on 07/02.  Follow up with Jossie July 2 with Herceptin, with me July 23 with Herceptin. CT CAP on July 22. CBC, CMP with follow up visits.     Thank you for allowing us to continue to participate in Hoda's care.      Lisandro Aguiar MD        [facility]         I spent 30 minutes with the patient more than 50% of which was in counseling and coordination of care.

## 2019-06-11 ENCOUNTER — INFUSION THERAPY VISIT (OUTPATIENT)
Dept: ONCOLOGY | Facility: CLINIC | Age: 64
End: 2019-06-11
Attending: INTERNAL MEDICINE
Payer: COMMERCIAL

## 2019-06-11 ENCOUNTER — ANCILLARY PROCEDURE (OUTPATIENT)
Dept: CARDIOLOGY | Facility: CLINIC | Age: 64
End: 2019-06-11
Attending: PHYSICIAN ASSISTANT
Payer: COMMERCIAL

## 2019-06-11 VITALS
SYSTOLIC BLOOD PRESSURE: 104 MMHG | DIASTOLIC BLOOD PRESSURE: 59 MMHG | BODY MASS INDEX: 30.81 KG/M2 | TEMPERATURE: 98 F | HEART RATE: 73 BPM | WEIGHT: 173.9 LBS | RESPIRATION RATE: 16 BRPM | OXYGEN SATURATION: 96 % | HEIGHT: 63 IN

## 2019-06-11 DIAGNOSIS — C50.912 MALIGNANT NEOPLASM OF LEFT FEMALE BREAST, UNSPECIFIED ESTROGEN RECEPTOR STATUS, UNSPECIFIED SITE OF BREAST (H): ICD-10-CM

## 2019-06-11 DIAGNOSIS — C50.912 MALIGNANT NEOPLASM OF LEFT FEMALE BREAST, UNSPECIFIED ESTROGEN RECEPTOR STATUS, UNSPECIFIED SITE OF BREAST (H): Primary | ICD-10-CM

## 2019-06-11 LAB
ALBUMIN SERPL-MCNC: 3.4 G/DL (ref 3.4–5)
ALP SERPL-CCNC: 39 U/L (ref 40–150)
ALT SERPL W P-5'-P-CCNC: 34 U/L (ref 0–50)
ANION GAP SERPL CALCULATED.3IONS-SCNC: 6 MMOL/L (ref 3–14)
AST SERPL W P-5'-P-CCNC: 32 U/L (ref 0–45)
BASOPHILS # BLD AUTO: 0 10E9/L (ref 0–0.2)
BASOPHILS NFR BLD AUTO: 0.5 %
BILIRUB SERPL-MCNC: 0.1 MG/DL (ref 0.2–1.3)
BUN SERPL-MCNC: 16 MG/DL (ref 7–30)
CALCIUM SERPL-MCNC: 8.5 MG/DL (ref 8.5–10.1)
CANCER AG27-29 SERPL-ACNC: 11 U/ML (ref 0–39)
CEA SERPL-MCNC: 0.7 UG/L (ref 0–2.5)
CHLORIDE SERPL-SCNC: 108 MMOL/L (ref 94–109)
CO2 SERPL-SCNC: 26 MMOL/L (ref 20–32)
CREAT SERPL-MCNC: 0.66 MG/DL (ref 0.52–1.04)
DIFFERENTIAL METHOD BLD: ABNORMAL
EOSINOPHIL # BLD AUTO: 0.2 10E9/L (ref 0–0.7)
EOSINOPHIL NFR BLD AUTO: 2.7 %
ERYTHROCYTE [DISTWIDTH] IN BLOOD BY AUTOMATED COUNT: 13.5 % (ref 10–15)
GFR SERPL CREATININE-BSD FRML MDRD: >90 ML/MIN/{1.73_M2}
GLUCOSE SERPL-MCNC: 79 MG/DL (ref 70–99)
HCT VFR BLD AUTO: 36.2 % (ref 35–47)
HGB BLD-MCNC: 11.3 G/DL (ref 11.7–15.7)
IMM GRANULOCYTES # BLD: 0.1 10E9/L (ref 0–0.4)
IMM GRANULOCYTES NFR BLD: 0.6 %
LYMPHOCYTES # BLD AUTO: 2.9 10E9/L (ref 0.8–5.3)
LYMPHOCYTES NFR BLD AUTO: 36.2 %
MCH RBC QN AUTO: 29.8 PG (ref 26.5–33)
MCHC RBC AUTO-ENTMCNC: 31.2 G/DL (ref 31.5–36.5)
MCV RBC AUTO: 96 FL (ref 78–100)
MONOCYTES # BLD AUTO: 0.6 10E9/L (ref 0–1.3)
MONOCYTES NFR BLD AUTO: 7 %
NEUTROPHILS # BLD AUTO: 4.3 10E9/L (ref 1.6–8.3)
NEUTROPHILS NFR BLD AUTO: 53 %
NRBC # BLD AUTO: 0 10*3/UL
NRBC BLD AUTO-RTO: 0 /100
PLATELET # BLD AUTO: 186 10E9/L (ref 150–450)
POTASSIUM SERPL-SCNC: 4.1 MMOL/L (ref 3.4–5.3)
PROT SERPL-MCNC: 7.4 G/DL (ref 6.8–8.8)
RBC # BLD AUTO: 3.79 10E12/L (ref 3.8–5.2)
SODIUM SERPL-SCNC: 140 MMOL/L (ref 133–144)
WBC # BLD AUTO: 8.1 10E9/L (ref 4–11)

## 2019-06-11 PROCEDURE — 25000128 H RX IP 250 OP 636: Mod: ZF | Performed by: INTERNAL MEDICINE

## 2019-06-11 PROCEDURE — 80053 COMPREHEN METABOLIC PANEL: CPT | Performed by: INTERNAL MEDICINE

## 2019-06-11 PROCEDURE — 96413 CHEMO IV INFUSION 1 HR: CPT

## 2019-06-11 PROCEDURE — G0463 HOSPITAL OUTPT CLINIC VISIT: HCPCS | Mod: ZF

## 2019-06-11 PROCEDURE — 86300 IMMUNOASSAY TUMOR CA 15-3: CPT | Performed by: INTERNAL MEDICINE

## 2019-06-11 PROCEDURE — 85025 COMPLETE CBC W/AUTO DIFF WBC: CPT | Performed by: INTERNAL MEDICINE

## 2019-06-11 PROCEDURE — 82378 CARCINOEMBRYONIC ANTIGEN: CPT | Performed by: INTERNAL MEDICINE

## 2019-06-11 PROCEDURE — 25800030 ZZH RX IP 258 OP 636: Mod: ZF | Performed by: INTERNAL MEDICINE

## 2019-06-11 PROCEDURE — 99214 OFFICE O/P EST MOD 30 MIN: CPT | Mod: ZP | Performed by: INTERNAL MEDICINE

## 2019-06-11 RX ORDER — HEPARIN SODIUM (PORCINE) LOCK FLUSH IV SOLN 100 UNIT/ML 100 UNIT/ML
5 SOLUTION INTRAVENOUS EVERY 8 HOURS
Status: CANCELLED | OUTPATIENT
Start: 2019-06-11

## 2019-06-11 RX ORDER — EPINEPHRINE 1 MG/ML
0.3 INJECTION, SOLUTION INTRAMUSCULAR; SUBCUTANEOUS EVERY 5 MIN PRN
Status: CANCELLED | OUTPATIENT
Start: 2019-06-11

## 2019-06-11 RX ORDER — DIPHENHYDRAMINE HYDROCHLORIDE 50 MG/ML
50 INJECTION INTRAMUSCULAR; INTRAVENOUS
Status: CANCELLED
Start: 2019-06-11

## 2019-06-11 RX ORDER — EPINEPHRINE 0.3 MG/.3ML
0.3 INJECTION SUBCUTANEOUS EVERY 5 MIN PRN
Status: CANCELLED | OUTPATIENT
Start: 2019-06-11

## 2019-06-11 RX ORDER — MEPERIDINE HYDROCHLORIDE 25 MG/ML
25 INJECTION INTRAMUSCULAR; INTRAVENOUS; SUBCUTANEOUS EVERY 30 MIN PRN
Status: CANCELLED | OUTPATIENT
Start: 2019-06-11

## 2019-06-11 RX ORDER — ACETAMINOPHEN 325 MG/1
650 TABLET ORAL
Status: CANCELLED | OUTPATIENT
Start: 2019-06-11

## 2019-06-11 RX ORDER — HEPARIN SODIUM (PORCINE) LOCK FLUSH IV SOLN 100 UNIT/ML 100 UNIT/ML
5 SOLUTION INTRAVENOUS
Status: COMPLETED | OUTPATIENT
Start: 2019-06-11 | End: 2019-06-11

## 2019-06-11 RX ORDER — SODIUM CHLORIDE 9 MG/ML
1000 INJECTION, SOLUTION INTRAVENOUS CONTINUOUS PRN
Status: CANCELLED
Start: 2019-06-11

## 2019-06-11 RX ORDER — ALBUTEROL SULFATE 0.83 MG/ML
2.5 SOLUTION RESPIRATORY (INHALATION)
Status: CANCELLED | OUTPATIENT
Start: 2019-06-11

## 2019-06-11 RX ORDER — METHYLPREDNISOLONE SODIUM SUCCINATE 125 MG/2ML
125 INJECTION, POWDER, LYOPHILIZED, FOR SOLUTION INTRAMUSCULAR; INTRAVENOUS
Status: CANCELLED
Start: 2019-06-11

## 2019-06-11 RX ORDER — ALBUTEROL SULFATE 90 UG/1
1-2 AEROSOL, METERED RESPIRATORY (INHALATION)
Status: CANCELLED
Start: 2019-06-11

## 2019-06-11 RX ORDER — HEPARIN SODIUM (PORCINE) LOCK FLUSH IV SOLN 100 UNIT/ML 100 UNIT/ML
5 SOLUTION INTRAVENOUS EVERY 8 HOURS
Status: DISCONTINUED | OUTPATIENT
Start: 2019-06-11 | End: 2019-06-11 | Stop reason: HOSPADM

## 2019-06-11 RX ORDER — LORAZEPAM 2 MG/ML
0.5 INJECTION INTRAMUSCULAR EVERY 4 HOURS PRN
Status: CANCELLED
Start: 2019-06-11

## 2019-06-11 RX ORDER — DIPHENHYDRAMINE HCL 25 MG
50 CAPSULE ORAL ONCE
Status: CANCELLED
Start: 2019-06-11

## 2019-06-11 RX ADMIN — SODIUM CHLORIDE 250 ML: 9 INJECTION, SOLUTION INTRAVENOUS at 16:39

## 2019-06-11 RX ADMIN — HEPARIN 5 ML: 100 SYRINGE at 17:14

## 2019-06-11 RX ADMIN — HEPARIN 5 ML: 100 SYRINGE at 13:28

## 2019-06-11 RX ADMIN — TRASTUZUMAB 450 MG: 150 INJECTION, POWDER, LYOPHILIZED, FOR SOLUTION INTRAVENOUS at 16:40

## 2019-06-11 ASSESSMENT — MIFFLIN-ST. JEOR: SCORE: 1312.81

## 2019-06-11 ASSESSMENT — PAIN SCALES - GENERAL: PAINLEVEL: NO PAIN (0)

## 2019-06-11 NOTE — PATIENT INSTRUCTIONS
Contact Numbers    [facility] Main Line: [phone]  [facility] Triage and after hours / weekends / holidays:  [phone]      Please call the triage or after hours line if you experience a temperature greater than or equal to 100.5, shaking chills, have uncontrolled nausea, vomiting and/or diarrhea, dizziness, shortness of breath, chest pain, bleeding, unexplained bruising, or if you have any other new/concerning symptoms, questions or concerns.      If you are having any concerning symptoms or wish to speak to a provider before your next infusion visit, please call your care coordinator or triage to notify them so we can adequately serve you.     If you need a refill on a narcotic prescription or other medication, please call before your infusion appointment.         June 2019 Sunday Monday Tuesday Wednesday Thursday Friday Saturday                                 1       2     3     4     5     6     7     8       9     10     11    ECHO COMPLETE  11:30 AM   (90 min.)   ECHCR1   [facility]    P [facility] DRAW   1:30 PM   (30 min.)    [facility] DRAW   [facility] Lab Draw    [facility] RETURN   1:45 PM   (90 min.)   Lisandro Aguiar MD   [facility]   2:30 PM   (60 min.)    ONCOLOGY INFUSION   [facility] 12     13     14     15       16     17     18     19     20     21     22       23     24     25     26     27     28     29       30                                               July 2019 Sunday Monday Tuesday Wednesday Thursday Friday Saturday        1     2    [facility] DRAW  10:45 AM   (15 min.)    [facility] DRAW   [facility] Lab Draw    [facility] RETURN  11:05 AM   (50 min.)   Jossie Sparrow PA   [facility]  12:00 PM   (60 min.)    ONCOLOGY INFUSION   [facility] 3     4     5     6       7     8     9     10     11     12     13        14     15     16     17     18     19     20       21     22    CT CHEST/ABDOMEN/PELVIS W   2:00 PM   (20 min.)   UCCT2   [facility] CT 23    [facility] DRAW   1:30 PM   (15 min.)   [facility] LAB DRAW   [facility] Lab Draw    [facility] RETURN   1:45 PM   (30 min.)   Lisandro Aguiar MD   [facility] ONC INFUSION 60   2:30 PM   (60 min.)    ONCOLOGY INFUSION   [facility] 24     25     26     27       28     29     30     31                                    Lab Results:  Recent Results (from the past 12 hour(s))   CBC with platelets differential    Collection Time: 06/11/19  1:36 PM   Result Value Ref Range    WBC 8.1 4.0 - 11.0 10e9/L    RBC Count 3.79 (L) 3.8 - 5.2 10e12/L    Hemoglobin 11.3 (L) 11.7 - 15.7 g/dL    Hematocrit 36.2 35.0 - 47.0 %    MCV 96 78 - 100 fl    MCH 29.8 26.5 - 33.0 pg    MCHC 31.2 (L) 31.5 - 36.5 g/dL    RDW 13.5 10.0 - 15.0 %    Platelet Count 186 150 - 450 10e9/L    Diff Method Automated Method     % Neutrophils 53.0 %    % Lymphocytes 36.2 %    % Monocytes 7.0 %    % Eosinophils 2.7 %    % Basophils 0.5 %    % Immature Granulocytes 0.6 %    Nucleated RBCs 0 0 /100    Absolute Neutrophil 4.3 1.6 - 8.3 10e9/L    Absolute Lymphocytes 2.9 0.8 - 5.3 10e9/L    Absolute Monocytes 0.6 0.0 - 1.3 10e9/L    Absolute Eosinophils 0.2 0.0 - 0.7 10e9/L    Absolute Basophils 0.0 0.0 - 0.2 10e9/L    Abs Immature Granulocytes 0.1 0 - 0.4 10e9/L    Absolute Nucleated RBC 0.0    Comprehensive metabolic panel    Collection Time: 06/11/19  1:36 PM   Result Value Ref Range    Sodium 140 133 - 144 mmol/L    Potassium 4.1 3.4 - 5.3 mmol/L    Chloride 108 94 - 109 mmol/L    Carbon Dioxide 26 20 - 32 mmol/L    Anion Gap 6 3 - 14 mmol/L    Glucose 79 70 - 99 mg/dL    Urea Nitrogen 16 7 - 30 mg/dL    Creatinine 0.66 0.52 - 1.04 mg/dL    GFR Estimate >90 >60 mL/min/[1.73_m2]    GFR Estimate If Black >90 >60 mL/min/[1.73_m2]    Calcium 8.5  8.5 - 10.1 mg/dL    Bilirubin Total 0.1 (L) 0.2 - 1.3 mg/dL    Albumin 3.4 3.4 - 5.0 g/dL    Protein Total 7.4 6.8 - 8.8 g/dL    Alkaline Phosphatase 39 (L) 40 - 150 U/L    ALT 34 0 - 50 U/L    AST 32 0 - 45 U/L

## 2019-06-11 NOTE — NURSING NOTE
"Oncology Rooming Note    June 11, 2019 2:14 PM   Hoda Brush is a 64 year old female who presents for:    Chief Complaint   Patient presents with     Port Draw     labs drawn from port by rn.  vs taken     Oncology Clinic Visit     Return visit related to Breast Cancer     Initial Vitals: /59 (BP Location: Right arm, Patient Position: Sitting, Cuff Size: Adult Large)   Pulse 73   Temp 98  F (36.7  C) (Oral)   Resp 16   Ht 1.6 m (5' 2.99\")   Wt 78.9 kg (173 lb 14.4 oz)   SpO2 96%   BMI 30.81 kg/m   Estimated body mass index is 30.81 kg/m  as calculated from the following:    Height as of this encounter: 1.6 m (5' 2.99\").    Weight as of this encounter: 78.9 kg (173 lb 14.4 oz). Body surface area is 1.87 meters squared.  No Pain (0) Comment: Data Unavailable   No LMP recorded. Patient is postmenopausal.  Allergies reviewed: Yes  Medications reviewed: Yes    Medications: Medication refills not needed today.  Pharmacy name entered into [employer]: [facility] [address] [place], [place] [id number]  [address] [place] & [facility]    Clinical concerns: No new concerns. Provider was notified.      Maria Fernanda Zhang LPN            "

## 2019-06-11 NOTE — NURSING NOTE
"Chief Complaint   Patient presents with     Port Draw     labs drawn from port by rn.  vs taken     (Prydeinig  present throughout appt).  Port accessed with 20 gauge 3/4\" gripper needle and labs drawn by rn.  Port flushed with NS and heparin.  Pt tolerated well.  VS taken.  Pt checked in for next appt.    Olga Hoff RN      "

## 2019-06-11 NOTE — PROGRESS NOTES
Infusion Nursing Note:  Hoda Brush presents today for Cycle 31 Day 1 Herceptin.    Patient seen by provider today: Yes: Dr. Aguiar   present during visit today: Yes, Language: Belizean.     Note: Patient presents to infusion today following her provider appointment. Patient denies any questions or concerns at this time.     Intravenous Access:  Implanted Port.    Treatment Conditions:  Lab Results   Component Value Date    HGB 11.3 06/11/2019     Lab Results   Component Value Date    WBC 8.1 06/11/2019      Lab Results   Component Value Date    ANEU 4.3 06/11/2019     Lab Results   Component Value Date     06/11/2019      Lab Results   Component Value Date     06/11/2019                   Lab Results   Component Value Date    POTASSIUM 4.1 06/11/2019           No results found for: MAG         Lab Results   Component Value Date    CR 0.66 06/11/2019                   Lab Results   Component Value Date    TAYLER 8.5 06/11/2019                Lab Results   Component Value Date    BILITOTAL 0.1 06/11/2019           Lab Results   Component Value Date    ALBUMIN 3.4 06/11/2019                    Lab Results   Component Value Date    ALT 34 06/11/2019           Lab Results   Component Value Date    AST 32 06/11/2019     Results reviewed, labs MET treatment parameters, ok to proceed with treatment.  ECHO/MUGA completed 6/11/19  EF 62%.      Post Infusion Assessment:  Patient tolerated infusion without incident.  Blood return noted pre and post infusion.  Site patent and intact, free from redness, edema or discomfort.  No evidence of extravasations.  Access discontinued per protocol.       Discharge Plan:   Patient declined prescription refills.  Discharge instructions reviewed with: Patient.  Patient and/or family verbalized understanding of discharge instructions and all questions answered.  AVS to patient via [employer].  Patient will return 7/2/19 for next appointment.   Patient discharged in  stable condition accompanied by: self.  Departure Mode: Ambulatory.    Nathalie Dyson RN

## 2019-06-11 NOTE — LETTER
6/11/2019       RE: Hoda Brush  [address] [address]     Dear Colleague,    Thank you for referring your patient, Hoda Brush, to the [facility]. Please see a copy of my visit note below.    Hoda is seen with a Par[place]ayan . The patient is a refugee from [facility] and is here for continuation of care for her metastatic ER+HER2- breast cancer.  She is a Mandae[employer] refugee from [facility] and was seen in clinic with her daughter.  The only data we have from [facility] is an English translation of her records.       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  On 02/11/2014, she was seen by an oncologist.  The clinical staging of T4b N2 M1 was noted.   Her breast cancer was on the left side. There was no right breast cancer, confirmed by the patient and her daughter, correcting a possible error in the translated records.  ER was positive in 100% of the cells, TN at 14% and HER2 was 3+ positive.  It appears that this histopathologic information is on the mastectomy specimen. She underwent an MRI for staging of presumptive bone metastases which was performed 03/02/2014.  There were skeletal metastases in the thoracic vertebrae at 12, L1, L3, L5 and S1 vertebral body, ranging in size from 0.7-3.0 cm in size.  Ischial and right femur were also involved, as well as a large iliac mass measuring about 15 cm in the uterus. 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   Radiation Oncology consultation was performed 03/11/2014, and she was given radiation in 1 dose of 6 Gy to the right iliac lesion.        With the initial diagnosis, she initiated treatment with 6 cycles of CAF neoadjuvant chemotherapy 02/14, 07/07, 03/28, 04/18/14, 05/08 and 05/29/14. She also received monthly zoledronic acid.  She then underwent a modified left mastectomy of Palacios type and left lymphadenoectomy on 06/27/2014.    Pathologic examination showed number at [facility] was [id number] showed infiltrative grade 2 ductal cancer with 6 level 1 metastatic lympFollow up with Jossie for visits and trastuzumab on 2-5, 2-26, 3-19 with CBC, CMP.  Echocardiogram on 3-19.  Follow up with me 4-9 with CBC, CMP, CA27.29 and CEA and with CT CAP on 4-8.h nodes and 3 level 2 metastatic lymph nodes.  The differentiation was intermediate.  The tumor was ER positive 70% of the cells, KY positive in 40% of the cells and HER2 was 3+ by immunohistochemistry and the Ki-67 labeling index was 20%. Her staging after surgery was stage IV, pT4b N2 M1.  I don't see a biopsy of the skeletal metastases.  She then had continuation with chemotherapy with 4 cycles of Herceptin and taxane with monthly zoledronic acid.  Tamoxifen was initiated.  She then had two years of Herceptin and a decision was made not to continue further Herceptin.  She continued on zoledronic acid every 3 months. She was clinically stable. [place] the [place] new [place] from [facility].  She arrived last month, established [place] residency and now seeks further oncology care.       TREATMENT HISTORY:  A. Initial diagnosis with metastatic breast cancer in [facility].  Neoadjuvant CAF x 6.   B. Left mastectomy. Left axillary node dissection.  C.  Radiation in 1 dose to R iliac region.  C. Herceptin for 2 years taxane for a prescribed course then stopped, monthly zoledronic acid.  She had 2 years of Herceptin with tamoxifen added after chemotherapy.  D.  Tamoxifen alone and zoledronic acid every 3 months.  E[place]  [place]  We restarted Herceptin every 3 weeks and continued tamoxifen. Bone targeted agent changed to denosumab every 9 weeks.     FOLLOWUP NOTE   HISTORY OF PRESENT ILLNESS:  Hoda returns to clinic for routine followup and continuation of tamoxifen and Herceptin for metastatic ER-positive, HER2-postive breast cancer.  She has no pain, no fatigue, no depression  or anxiety.      REVIEW OF SYSTEMS:  A 10-point review of systems is entirely negative.      Her uterus is in place.  She has had no vaginal bleeding.  She continues on tamoxifen and Herceptin.  Her ejection fraction measured recently was 62%.      PHYSICAL EXAMINATION:   VITAL SIGNS:  Blood pressure was 104/59, temperature 98, pulse 73, respirations 16, O2 sat 96% on room air, weight 78.9 kg and height 1.6 meters.   GENERAL:  Hoda appeared generally well.  She has no alopecia.   HEENT:  Oropharynx is without lesions.   LYMPH:  There is no palpable cervical, supraclavicular, subclavicular or axillary lymphadenopathy.   LUNGS:  Clear to percussion and auscultation.   HEART:  Regular rate and rhythm, S1, S2.   ABDOMEN:  Soft and nontender, without hepatosplenomegaly.   EXTREMITIES:  Without edema.   PSYCHIATRIC:  Mood and affect were normal.      LABORATORY DATA:     CBC:  WBC 8.1, hemoglobin 11.3 and platelets 186,000.     CMP was normal.  CEA and CA 27-29 are both in the normal range.      ASSESSMENT AND PLAN:   1.  Hoda Brush is a 64-year-old woman with a history of ER-positive, AL-positive, HER2-positive breast cancer.  She is from [facility] [email or web address] [place] from [facility], [place].  She is here for continuation of every-3-week Herceptin, which she receives along with tamoxifen daily.  The tumor is ER-positive, AL-positive and HER2-positive.  She had metastatic disease at the time of presentation with bone-only metastases by report.  She presented with metastatic disease in 02/2014.  Staging initially showed bone-only metastases.  She had a right hip metastasis.  She underwent neoadjuvant CAF mastectomy, left axillary lymph node dissection and radiation to the right hip including the right iliac region where she had metastatic disease.  She continues on tamoxifen and every-3-week trastuzumab and has had no evidence of disease progression over the past 2-1/2 years.   2.   Discussion of hormonal therapy.  I discussed that it would be reasonable, after 5 years of tamoxifen are completed in 09/2019, that we change her hormonal therapy to an aromatase inhibitor.  This would decrease the risk of pulmonary embolus and uterine cancer.  We would continue the letrozole indefinitely.  We may monitor her bone density given her oligometastatic bone disease which has been stable for at least 5 years.   3.  Discussion of bone health.  She will continue with calcium and vitamin D.  We will give Xgeva every 84 days or every fourth trastuzumab treatment.   4.  Followup.  We will see Hoda in followup with Jossie on 07/02.  Follow up with Jossie July 2 with Herceptin, with me July 23 with Herceptin. CT CAP on July 22. CBC, CMP with follow up visits.     Thank you for allowing us to continue to participate in Hoda's care.      Lisandro Aguiar MD        [facility]         I spent 30 minutes with the patient more than 50% of which was in counseling and coordination of care.

## 2019-07-02 ENCOUNTER — INFUSION THERAPY VISIT (OUTPATIENT)
Dept: ONCOLOGY | Facility: CLINIC | Age: 64
End: 2019-07-02
Attending: INTERNAL MEDICINE
Payer: COMMERCIAL

## 2019-07-02 ENCOUNTER — ONCOLOGY VISIT (OUTPATIENT)
Dept: ONCOLOGY | Facility: CLINIC | Age: 64
End: 2019-07-02
Attending: PHYSICIAN ASSISTANT
Payer: COMMERCIAL

## 2019-07-02 VITALS
WEIGHT: 169.2 LBS | HEART RATE: 78 BPM | SYSTOLIC BLOOD PRESSURE: 109 MMHG | DIASTOLIC BLOOD PRESSURE: 58 MMHG | OXYGEN SATURATION: 98 % | TEMPERATURE: 98.3 F | BODY MASS INDEX: 29.98 KG/M2

## 2019-07-02 DIAGNOSIS — C50.912 MALIGNANT NEOPLASM OF LEFT FEMALE BREAST, UNSPECIFIED ESTROGEN RECEPTOR STATUS, UNSPECIFIED SITE OF BREAST (H): Primary | ICD-10-CM

## 2019-07-02 DIAGNOSIS — C79.51 BONE METASTASIS: ICD-10-CM

## 2019-07-02 LAB
ALBUMIN SERPL-MCNC: 3.4 G/DL (ref 3.4–5)
ALP SERPL-CCNC: 42 U/L (ref 40–150)
ALT SERPL W P-5'-P-CCNC: 28 U/L (ref 0–50)
ANION GAP SERPL CALCULATED.3IONS-SCNC: 6 MMOL/L (ref 3–14)
AST SERPL W P-5'-P-CCNC: 22 U/L (ref 0–45)
BASOPHILS # BLD AUTO: 0 10E9/L (ref 0–0.2)
BASOPHILS NFR BLD AUTO: 0.6 %
BILIRUB SERPL-MCNC: 0.3 MG/DL (ref 0.2–1.3)
BUN SERPL-MCNC: 13 MG/DL (ref 7–30)
CALCIUM SERPL-MCNC: 8.4 MG/DL (ref 8.5–10.1)
CANCER AG27-29 SERPL-ACNC: 15 U/ML (ref 0–39)
CEA SERPL-MCNC: <0.5 UG/L (ref 0–2.5)
CHLORIDE SERPL-SCNC: 107 MMOL/L (ref 94–109)
CO2 SERPL-SCNC: 27 MMOL/L (ref 20–32)
CREAT SERPL-MCNC: 0.74 MG/DL (ref 0.52–1.04)
DIFFERENTIAL METHOD BLD: ABNORMAL
EOSINOPHIL # BLD AUTO: 0.2 10E9/L (ref 0–0.7)
EOSINOPHIL NFR BLD AUTO: 2.8 %
ERYTHROCYTE [DISTWIDTH] IN BLOOD BY AUTOMATED COUNT: 13.2 % (ref 10–15)
GFR SERPL CREATININE-BSD FRML MDRD: 87 ML/MIN/{1.73_M2}
GLUCOSE SERPL-MCNC: 83 MG/DL (ref 70–99)
HCT VFR BLD AUTO: 37 % (ref 35–47)
HGB BLD-MCNC: 11.6 G/DL (ref 11.7–15.7)
IMM GRANULOCYTES # BLD: 0 10E9/L (ref 0–0.4)
IMM GRANULOCYTES NFR BLD: 0.1 %
LYMPHOCYTES # BLD AUTO: 2.4 10E9/L (ref 0.8–5.3)
LYMPHOCYTES NFR BLD AUTO: 34.4 %
MCH RBC QN AUTO: 29.7 PG (ref 26.5–33)
MCHC RBC AUTO-ENTMCNC: 31.4 G/DL (ref 31.5–36.5)
MCV RBC AUTO: 95 FL (ref 78–100)
MONOCYTES # BLD AUTO: 0.4 10E9/L (ref 0–1.3)
MONOCYTES NFR BLD AUTO: 6.1 %
NEUTROPHILS # BLD AUTO: 3.9 10E9/L (ref 1.6–8.3)
NEUTROPHILS NFR BLD AUTO: 56 %
NRBC # BLD AUTO: 0 10*3/UL
NRBC BLD AUTO-RTO: 0 /100
PLATELET # BLD AUTO: 188 10E9/L (ref 150–450)
POTASSIUM SERPL-SCNC: 4 MMOL/L (ref 3.4–5.3)
PROT SERPL-MCNC: 7.6 G/DL (ref 6.8–8.8)
RBC # BLD AUTO: 3.9 10E12/L (ref 3.8–5.2)
SODIUM SERPL-SCNC: 139 MMOL/L (ref 133–144)
WBC # BLD AUTO: 6.9 10E9/L (ref 4–11)

## 2019-07-02 PROCEDURE — 96413 CHEMO IV INFUSION 1 HR: CPT

## 2019-07-02 PROCEDURE — 96372 THER/PROPH/DIAG INJ SC/IM: CPT | Mod: 59

## 2019-07-02 PROCEDURE — G0463 HOSPITAL OUTPT CLINIC VISIT: HCPCS | Mod: ZF

## 2019-07-02 PROCEDURE — 85025 COMPLETE CBC W/AUTO DIFF WBC: CPT | Performed by: INTERNAL MEDICINE

## 2019-07-02 PROCEDURE — 86300 IMMUNOASSAY TUMOR CA 15-3: CPT | Performed by: INTERNAL MEDICINE

## 2019-07-02 PROCEDURE — 82378 CARCINOEMBRYONIC ANTIGEN: CPT | Performed by: INTERNAL MEDICINE

## 2019-07-02 PROCEDURE — 99214 OFFICE O/P EST MOD 30 MIN: CPT | Mod: ZP | Performed by: PHYSICIAN ASSISTANT

## 2019-07-02 PROCEDURE — 25000128 H RX IP 250 OP 636: Mod: ZF | Performed by: PHYSICIAN ASSISTANT

## 2019-07-02 PROCEDURE — T1013 SIGN LANG/ORAL INTERPRETER: HCPCS | Mod: U3

## 2019-07-02 PROCEDURE — 80053 COMPREHEN METABOLIC PANEL: CPT | Performed by: INTERNAL MEDICINE

## 2019-07-02 PROCEDURE — 25800030 ZZH RX IP 258 OP 636: Mod: ZF | Performed by: PHYSICIAN ASSISTANT

## 2019-07-02 RX ORDER — ALBUTEROL SULFATE 90 UG/1
1-2 AEROSOL, METERED RESPIRATORY (INHALATION)
Status: CANCELLED
Start: 2019-07-02

## 2019-07-02 RX ORDER — HEPARIN SODIUM (PORCINE) LOCK FLUSH IV SOLN 100 UNIT/ML 100 UNIT/ML
5 SOLUTION INTRAVENOUS EVERY 8 HOURS
Status: CANCELLED | OUTPATIENT
Start: 2019-07-02

## 2019-07-02 RX ORDER — HEPARIN SODIUM (PORCINE) LOCK FLUSH IV SOLN 100 UNIT/ML 100 UNIT/ML
5 SOLUTION INTRAVENOUS EVERY 8 HOURS
Status: DISCONTINUED | OUTPATIENT
Start: 2019-07-02 | End: 2019-07-02 | Stop reason: HOSPADM

## 2019-07-02 RX ORDER — METHYLPREDNISOLONE SODIUM SUCCINATE 125 MG/2ML
125 INJECTION, POWDER, LYOPHILIZED, FOR SOLUTION INTRAMUSCULAR; INTRAVENOUS
Status: CANCELLED
Start: 2019-07-02

## 2019-07-02 RX ORDER — MEPERIDINE HYDROCHLORIDE 25 MG/ML
25 INJECTION INTRAMUSCULAR; INTRAVENOUS; SUBCUTANEOUS EVERY 30 MIN PRN
Status: CANCELLED | OUTPATIENT
Start: 2019-07-02

## 2019-07-02 RX ORDER — LORAZEPAM 2 MG/ML
0.5 INJECTION INTRAMUSCULAR EVERY 4 HOURS PRN
Status: CANCELLED
Start: 2019-07-02

## 2019-07-02 RX ORDER — HEPARIN SODIUM (PORCINE) LOCK FLUSH IV SOLN 100 UNIT/ML 100 UNIT/ML
5 SOLUTION INTRAVENOUS ONCE
Status: COMPLETED | OUTPATIENT
Start: 2019-07-02 | End: 2019-07-02

## 2019-07-02 RX ORDER — SODIUM CHLORIDE 9 MG/ML
1000 INJECTION, SOLUTION INTRAVENOUS CONTINUOUS PRN
Status: CANCELLED
Start: 2019-07-02

## 2019-07-02 RX ORDER — ALBUTEROL SULFATE 0.83 MG/ML
2.5 SOLUTION RESPIRATORY (INHALATION)
Status: CANCELLED | OUTPATIENT
Start: 2019-07-02

## 2019-07-02 RX ORDER — EPINEPHRINE 1 MG/ML
0.3 INJECTION, SOLUTION INTRAMUSCULAR; SUBCUTANEOUS EVERY 5 MIN PRN
Status: CANCELLED | OUTPATIENT
Start: 2019-07-02

## 2019-07-02 RX ORDER — DIPHENHYDRAMINE HYDROCHLORIDE 50 MG/ML
50 INJECTION INTRAMUSCULAR; INTRAVENOUS
Status: CANCELLED
Start: 2019-07-02

## 2019-07-02 RX ORDER — DIPHENHYDRAMINE HCL 25 MG
50 CAPSULE ORAL ONCE
Status: CANCELLED
Start: 2019-07-02

## 2019-07-02 RX ORDER — ACETAMINOPHEN 325 MG/1
650 TABLET ORAL
Status: CANCELLED | OUTPATIENT
Start: 2019-07-02

## 2019-07-02 RX ORDER — EPINEPHRINE 0.3 MG/.3ML
0.3 INJECTION SUBCUTANEOUS EVERY 5 MIN PRN
Status: CANCELLED | OUTPATIENT
Start: 2019-07-02

## 2019-07-02 RX ADMIN — HEPARIN 5 ML: 100 SYRINGE at 10:54

## 2019-07-02 RX ADMIN — SODIUM CHLORIDE 250 ML: 9 INJECTION, SOLUTION INTRAVENOUS at 12:39

## 2019-07-02 RX ADMIN — HEPARIN SODIUM (PORCINE) LOCK FLUSH IV SOLN 100 UNIT/ML 5 ML: 100 SOLUTION at 13:12

## 2019-07-02 RX ADMIN — DENOSUMAB 120 MG: 120 INJECTION SUBCUTANEOUS at 12:42

## 2019-07-02 RX ADMIN — TRASTUZUMAB 450 MG: 150 INJECTION, POWDER, LYOPHILIZED, FOR SOLUTION INTRAVENOUS at 12:41

## 2019-07-02 ASSESSMENT — PAIN SCALES - GENERAL: PAINLEVEL: NO PAIN (0)

## 2019-07-02 NOTE — PATIENT INSTRUCTIONS
[facility] Main Line: [phone]    Call triage nurse with chills and/or temperature greater than or equal to 100.4, uncontrolled nausea/vomiting, diarrhea, constipation, dizziness, shortness of breath, chest pain, bleeding, unexplained bruising, or any new/concerning symptoms, questions/concerns.     If you are having any concerning symptoms or wish to speak to a provider before your next infusion visit, please call your care coordinator or triage to notify them so we can adequately serve you.     For triage nurse, after hours, weekends, and holidays: [phone]

## 2019-07-02 NOTE — NURSING NOTE
"Oncology Rooming Note    July 2, 2019 11:32 AM   Hoda Brush is a 64 year old female who presents for:    Chief Complaint   Patient presents with     Port Draw     labs drawn via port by RN     Oncology Clinic Visit     Breast Ca , labs, Tx     Initial Vitals: /58 (BP Location: Left arm, Patient Position: Chair, Cuff Size: Adult Regular)   Pulse 78   Temp 98.3  F (36.8  C) (Oral)   Wt 76.7 kg (169 lb 3.2 oz)   SpO2 98%   BMI 29.98 kg/m   Estimated body mass index is 29.98 kg/m  as calculated from the following:    Height as of 6/11/19: 1.6 m (5' 2.99\").    Weight as of this encounter: 76.7 kg (169 lb 3.2 oz). Body surface area is 1.85 meters squared.  No Pain (0) Comment: Data Unavailable   No LMP recorded. Patient is postmenopausal.  Allergies reviewed: Yes  Medications reviewed: Yes    Medications: Medication refills not needed today.  Pharmacy name entered into [employer]: [facility] [id number] [place] [address] [id number] AT  [address] [facility]    Clinical concerns: no concerns  Bryantbebomi  was notified.      Amanda Hernandez MA              "

## 2019-07-02 NOTE — NURSING NOTE
Chief Complaint   Patient presents with     Port Draw     labs drawn via port by RN     /58 (BP Location: Left arm, Patient Position: Chair, Cuff Size: Adult Regular)   Pulse 78   Temp 98.3  F (36.8  C) (Oral)   Wt 76.7 kg (169 lb 3.2 oz)   SpO2 98%   BMI 29.98 kg/m      Port accessed by RN in lab. Labs collected and sent. Line flushed with NS & Heparin. Pt tolerated well.   Pt checked in for next appointment.    Marlene Alonso, RN

## 2019-07-02 NOTE — PROGRESS NOTES
Oncology/Hematology Visit Note  Jul 2, 2019    Reason for Visit: follow up of ER positive, HER2 positive left metastatic breast cancer    History of Present Illness: Hoda Brush is a 64 year old female with ER positive, HER2 positive left metastatic breast cancer with bone mets.     TREATMENT HISTORY:  A. Initial diagnosis with metastatic breast cancer in [facility].  Neoadjuvant CAF x 6.   B. Left mastectomy. Left axillary node dissection.  C.  Radiation in 1 dose to R iliac region. g Gy.  C. Herceptin for 2 years taxane for a prescribed course then stopped, monthly zoledronic acid.  She had 2 years of Herceptin with tamoxifen added after chemotherapy.  D.  Tamoxifen alone and zoledronic acid every 3 months.  E.  [place]  We restarted Herceptin every 3 weeks and continued tamoxifen. Bone targeted agent changed to denosumab every 6 weeks.      She is from [facility], [facility] from [facility], and [place]  She lives with her daughter and is here for continuation of every 3 week Herceptin, which she receives along with tamoxifen.  Her tumor is ER positive, IL positive, HER-2 positive.  She had metastatic disease at the time of presentation with bone-only metastases by report.  She presented with metastatic disease in 02/2014.  Staging was initially bone-only metastases by report.  She did her staging in 02/2014 that showed that she had stage IV disease, T4N2M1 invasive ductal carcinoma of the left breast.  She had a right hip metastasis.  She underwent neoadjuvant CAF, left mastectomy, left axillary lymph node dissection and radiation.  She had radiation of the right iliac region where she had metastatic disease.  Pathology report from [facility] shows the tumor to be positive for ER in 75% of the cells, positive for IL in 40% of the cells, and the HER-2 was 3+ positive by immunohistochemistry.  The Ki-67 was 20%.  She had no evidence of nonbone metastatic disease on her PET/CT scan from  08/2017.    Restaging on 4/8/19 was stable.     Interval History:  Hoda is here for follow up today.  present. She is feeling overall well. She has had bridge put in by dentist yesterday. She has difficulty eating still due to discomfort, but she is pleased to have teeth again. She denies any other pain. She has not had any recurrence of episodes of chest pain. Denies any dizziness, dyspnea, pleuritic pain. No orthopnea, leg swelling. Mild LUE swelling. She has a lymphedema sleeve if needed. Tolerating tamoxifen. No vaginal bleeding. Taking calcium and vitamin D. No depression or anxiety. She is walking regularly with her  for exercise.    Review of Systems:  Patient denies any of the following except if noted above: fevers, chills, abdominal pain, nausea, vomiting, diarrhea, constipation, urinary concerns, headaches, cough.    Current Outpatient Medications   Medication Sig Dispense Refill     melatonin 3 MG tablet Take 1 tablet (3 mg) by mouth nightly as needed for sleep 30 tablet 11     order for DME Equipment being ordered: 2 Mastectomy bras and 1 prosthetheses. 2 Piece 0     order for DME L UE class 2 compression sleeve and gauntlet  Night garment  Bandaging supplies 1 each 1     tamoxifen (NOLVADEX) 20 MG tablet Take 1 tablet (20 mg) by mouth daily 90 tablet 3     VITAMIN D, CHOLECALCIFEROL, PO Take 1,000 Units by mouth daily         Physical Examination:  General: The patient is a pleasant female in no acute distress.  There were no vitals taken for this visit.  Wt Readings from Last 10 Encounters:   06/11/19 78.9 kg (173 lb 14.4 oz)   05/28/19 80.3 kg (177 lb)   05/21/19 80.3 kg (177 lb)   04/30/19 79.2 kg (174 lb 9.6 oz)   04/09/19 79.2 kg (174 lb 9.6 oz)   03/19/19 79.2 kg (174 lb 8 oz)   02/26/19 80 kg (176 lb 6.4 oz)   02/05/19 80.2 kg (176 lb 14.4 oz)   01/15/19 79.1 kg (174 lb 4.8 oz)   01/08/19 79.9 kg (176 lb 3.2 oz)     HEENT: EOMI, PERRL. Sclerae are anicteric. Oral mucosa is  pink and moist with no lesions or thrush.   Lymph: No palpable cervical, supraclavicular, subclavicular or axillary lymphadenopathy.   Heart: Regular rate and rhythm.   Lungs: Clear to auscultation bilaterally.   Abdomen: Bowel sounds present, soft, nontender with no palpable hepatosplenomegaly or masses.   Extremities: No lower extremity edema noted bilaterally.   Neuro: Cranial nerves II through XII are grossly intact.  Skin: No rashes, petechiae, or bruising noted on exposed skin.  Laboratory Data:  Results for NATASHA LOUIS (MRN [id number]) as of 7/2/2019 11:47   7/2/2019 11:02   Sodium 139   Potassium 4.0   Chloride 107   Carbon Dioxide 27   Urea Nitrogen 13   Creatinine 0.74   GFR Estimate 87   GFR Estimate If Black >90   Calcium 8.4 (L)   Anion Gap 6   Albumin 3.4   Protein Total 7.6   Bilirubin Total 0.3   Alkaline Phosphatase 42   ALT 28   AST 22   Glucose 83   WBC 6.9   Hemoglobin 11.6 (L)   Hematocrit 37.0   Platelet Count 188   RBC Count 3.90   MCV 95   MCH 29.7   MCHC 31.4 (L)   RDW 13.2   Diff Method Automated Method   % Neutrophils 56.0   % Lymphocytes 34.4   % Monocytes 6.1   % Eosinophils 2.8   % Basophils 0.6   % Immature Granulocytes 0.1   Nucleated RBCs 0   Absolute Neutrophil 3.9   Absolute Lymphocytes 2.4   Absolute Monocytes 0.4   Absolute Eosinophils 0.2   Absolute Basophils 0.0   Abs Immature Granulocytes 0.0   Absolute Nucleated RBC 0.0     Results for NATASHA LOUIS (MRN [id number]) as of 7/2/2019 15:00   Ref. Range 5/21/2019 08:05 6/11/2019 13:36 7/2/2019 11:02   CA 27-29 Latest Ref Range: 0 - 39 U/mL 7 11 15   Results for NATASHA LOUIS (MRN [id number]) as of 7/2/2019 15:00   Ref. Range 5/21/2019 08:05 6/11/2019 13:36 7/2/2019 11:02   CEA Latest Ref Range: 0 - 2.5 ug/L 0.6 0.7 <0.5       Assessment and Plan:    1. Metastatic breast cancer, ER, [place], HER2+ positive: Was last treated in [facility] with Herceptin. We have continued Herceptin every 3 weeks as well as  daily tamoxifen. CT CAP on 4/8/19 with stable disease. Echocardiogram on 6/11 with EF 62% and normal LV function. Tolerating well. Tumor markers stable  --Will proceed with Herceptin. Continue Tamoxifen. Plan to switch to letrozole ~9/2019  --Next echo due ~September 3rd    2. Bone metastasis: On xgeva every 6 weeks. On calcium + vitamin D. Calcium corrects to WNL. Cleared by dentist to proceed. Last dose 4/9. Next dose due today    3. Atypical chest pain: this has not recurred. She should see cardiology and have stress test if this recurs.       4. LUE lymphedema: She completed lymphedema treatment and has a sleeve to use now.      5. Insomnia: She feels melatonin is working. No longer taking remeron as was too sedating.     Follow up: Next CT 7/22 with labs, Dr. Aguiar and Herceptin on 7/23.    Jossie Sparrow PA-C  [facility]  [address], [place] [address] [phone]

## 2019-07-02 NOTE — PROGRESS NOTES
Infusion Nursing Note:  Hoda Brush presents today for Cycle 32 Day 1 Herceptin and Xgeva injection.     Patient seen by provider today: Yes: Jossie Sparrow PA-C   present during visit today: Yes: Russian     Note: Patient arrives after her clinic appointment. She offers no new complaints at this time.     Intravenous Access:  Implanted Port.    Treatment Conditions:  Lab Results   Component Value Date    HGB 11.6 07/02/2019     Lab Results   Component Value Date    WBC 6.9 07/02/2019      Lab Results   Component Value Date    ANEU 3.9 07/02/2019     Lab Results   Component Value Date     07/02/2019      Lab Results   Component Value Date     07/02/2019                   Lab Results   Component Value Date    POTASSIUM 4.0 07/02/2019           No results found for: MAG         Lab Results   Component Value Date    CR 0.74 07/02/2019                   Lab Results   Component Value Date    TAYLER 8.4 07/02/2019                Lab Results   Component Value Date    BILITOTAL 0.3 07/02/2019           Lab Results   Component Value Date    ALBUMIN 3.4 07/02/2019                    Lab Results   Component Value Date    ALT 28 07/02/2019           Lab Results   Component Value Date    AST 22 07/02/2019     Results reviewed, labs MET treatment parameters, ok to proceed with treatment.    Post Infusion Assessment:  Patient tolerated infusion without incident.  Patient tolerated Xgeva injection to RIGHT arm without incident.  Blood return noted pre and post infusion.  Site patent and intact, free from redness, edema or discomfort.  No evidence of extravasations.  Access discontinued per protocol.     Discharge Plan:   Patient declined prescription refills.  Patient and/or family verbalized understanding of discharge instructions and all questions answered.  AVS to patient via [employer].  Patient will return 7/23/2019 for next appointment.   Patient discharged in stable condition accompanied by:  .  Departure Mode: Ambulatory.    Shant Mckeon RN

## 2019-07-02 NOTE — LETTER
7/2/2019      RE: Hoda Brush  [address] [facility] [id number]       Oncology/Hematology Visit Note  Jul 2, 2019    Reason for Visit: follow up of ER positive, HER2 positive left metastatic breast cancer    History of Present Illness: Hoda Brush is a 64 year old female with ER positive, HER2 positive left metastatic breast cancer with bone mets.     TREATMENT HISTORY:  A. Initial diagnosis with metastatic breast cancer in [facility].  Neoadjuvant CAF x 6.   B. Left mastectomy. Left axillary node dissection.  C.  Radiation in 1 dose to R iliac region. g Gy.  C. Herceptin for 2 years taxane for a prescribed course then stopped, monthly zoledronic acid.  She had 2 years of Herceptin with tamoxifen added after chemotherapy.  D.  Tamoxifen alone and zoledronic acid every 3 months.  E.  [place]  We restarted Herceptin every 3 weeks and continued tamoxifen. Bone targeted agent changed to denosumab every 6 weeks.      She is from [facility], formerly from [facility], and [place]  She lives with her daughter and is here for continuation of every 3 week Herceptin, which she receives along with tamoxifen.  Her tumor is ER positive, NC positive, HER-2 positive.  She had metastatic disease at the time of presentation with bone-only metastases by report.  She presented with metastatic disease in 02/2014.  Staging was initially bone-only metastases by report.  She did her staging in 02/2014 that showed that she had stage IV disease, T4N2M1 invasive ductal carcinoma of the left breast.  She had a right hip metastasis.  She underwent neoadjuvant CAF, left mastectomy, left axillary lymph node dissection and radiation.  She had radiation of the right iliac region where she had metastatic disease.  Pathology report from [facility] shows the tumor to be positive for ER in 75% of the cells, positive for NC in 40% of the cells, and the HER-2 was 3+ positive by immunohistochemistry.  The Ki-67  was 20%.  She had no evidence of nonbone metastatic disease on her PET/CT scan from 08/2017.    Restaging on 4/8/19 was stable.     Interval History:  Hoda is here for follow up today.  present. She is feeling overall well. She has had bridge put in by dentist yesterday. She has difficulty eating still due to discomfort, but she is pleased to have teeth again. She denies any other pain. She has not had any recurrence of episodes of chest pain. Denies any dizziness, dyspnea, pleuritic pain. No orthopnea, leg swelling. Mild LUE swelling. She has a lymphedema sleeve if needed. Tolerating tamoxifen. No vaginal bleeding. Taking calcium and vitamin D. No depression or anxiety. She is walking regularly with her  for exercise.    Review of Systems:  Patient denies any of the following except if noted above: fevers, chills, abdominal pain, nausea, vomiting, diarrhea, constipation, urinary concerns, headaches, cough.    Current Outpatient Medications   Medication Sig Dispense Refill     melatonin 3 MG tablet Take 1 tablet (3 mg) by mouth nightly as needed for sleep 30 tablet 11     order for DME Equipment being ordered: 2 Mastectomy bras and 1 prosthetheses. 2 Piece 0     order for DME L UE class 2 compression sleeve and gauntlet  Night garment  Bandaging supplies 1 each 1     tamoxifen (NOLVADEX) 20 MG tablet Take 1 tablet (20 mg) by mouth daily 90 tablet 3     VITAMIN D, CHOLECALCIFEROL, PO Take 1,000 Units by mouth daily         Physical Examination:  General: The patient is a pleasant female in no acute distress.  There were no vitals taken for this visit.  Wt Readings from Last 10 Encounters:   06/11/19 78.9 kg (173 lb 14.4 oz)   05/28/19 80.3 kg (177 lb)   05/21/19 80.3 kg (177 lb)   04/30/19 79.2 kg (174 lb 9.6 oz)   04/09/19 79.2 kg (174 lb 9.6 oz)   03/19/19 79.2 kg (174 lb 8 oz)   02/26/19 80 kg (176 lb 6.4 oz)   02/05/19 80.2 kg (176 lb 14.4 oz)   01/15/19 79.1 kg (174 lb 4.8 oz)   01/08/19 79.9  kg (176 lb 3.2 oz)     HEENT: EOMI, PERRL. Sclerae are anicteric. Oral mucosa is pink and moist with no lesions or thrush.   Lymph: No palpable cervical, supraclavicular, subclavicular or axillary lymphadenopathy.   Heart: Regular rate and rhythm.   Lungs: Clear to auscultation bilaterally.   Abdomen: Bowel sounds present, soft, nontender with no palpable hepatosplenomegaly or masses.   Extremities: No lower extremity edema noted bilaterally.   Neuro: Cranial nerves II through XII are grossly intact.  Skin: No rashes, petechiae, or bruising noted on exposed skin.  Laboratory Data:  Results for NATASHA LOUIS (MRN [id number]) as of 7/2/2019 11:47   7/2/2019 11:02   Sodium 139   Potassium 4.0   Chloride 107   Carbon Dioxide 27   Urea Nitrogen 13   Creatinine 0.74   GFR Estimate 87   GFR Estimate If Black >90   Calcium 8.4 (L)   Anion Gap 6   Albumin 3.4   Protein Total 7.6   Bilirubin Total 0.3   Alkaline Phosphatase 42   ALT 28   AST 22   Glucose 83   WBC 6.9   Hemoglobin 11.6 (L)   Hematocrit 37.0   Platelet Count 188   RBC Count 3.90   MCV 95   MCH 29.7   MCHC 31.4 (L)   RDW 13.2   Diff Method Automated Method   % Neutrophils 56.0   % Lymphocytes 34.4   % Monocytes 6.1   % Eosinophils 2.8   % Basophils 0.6   % Immature Granulocytes 0.1   Nucleated RBCs 0   Absolute Neutrophil 3.9   Absolute Lymphocytes 2.4   Absolute Monocytes 0.4   Absolute Eosinophils 0.2   Absolute Basophils 0.0   Abs Immature Granulocytes 0.0   Absolute Nucleated RBC 0.0     Results for NATASHA LOUIS (MRN [id number]) as of 7/2/2019 15:00   Ref. Range 5/21/2019 08:05 6/11/2019 13:36 7/2/2019 11:02   CA 27-29 Latest Ref Range: 0 - 39 U/mL 7 11 15   Results for NATASHA LOUIS (MRN [id number]) as of 7/2/2019 15:00   Ref. Range 5/21/2019 08:05 6/11/2019 13:36 7/2/2019 11:02   CEA Latest Ref Range: 0 - 2.5 ug/L 0.6 0.7 <0.5       Assessment and Plan:    1. Metastatic breast cancer, ER, OK, HER2+ positive: Was last treated in  [place] with Herceptin. We have continued Herceptin every 3 weeks as well as daily tamoxifen. CT CAP on 4/8/19 with stable disease. Echocardiogram on 6/11 with EF 62% and normal LV function. Tolerating well. Tumor markers stable  --Will proceed with Herceptin. Continue Tamoxifen. Plan to switch to letrozole ~9/2019  --Next echo due ~September 3rd    2. Bone metastasis: On xgeva every 6 weeks. On calcium + vitamin D. Calcium corrects to WNL. Cleared by dentist to proceed. Last dose 4/9. Next dose due today    3. Atypical chest pain: this has not recurred. She should see cardiology and have stress test if this recurs.       4. LUE lymphedema: She completed lymphedema treatment and has a sleeve to use now.      5. Insomnia: She feels melatonin is working. No longer taking remeron as was too sedating.     Follow up: Next CT 7/22 with labs, Dr. Aguiar and Herceptin on 7/23.    Jossie Sparrow PA-C  [facility]  [address], [place] [id number] [phone]

## 2019-07-21 NOTE — PROGRESS NOTES
Hoda is seen with a [place] . The patient is a refugee from [facility] and is here for continuation of care for her metastatic ER+HER2- breast cancer.  She is a Denominational refugee from [facility] and was seen in clinic with her daughter.  The only data we have from [facility] is an English translation of her records.       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  On 02/11/2014, she was seen by an oncologist.  The clinical staging of T4b N2 M1 was noted.   Her breast cancer was on the left side. There was no right breast cancer, confirmed by the patient and her daughter, correcting a possible error in the translated records.  ER was positive in 100% of the cells, IN at 14% and HER2 was 3+ positive.  It appears that this histopathologic information is on the mastectomy specimen. She underwent an MRI for staging of presumptive bone metastases which was performed 03/02/2014.  There were skeletal metastases in the thoracic vertebrae at 12, L1, L3, L5 and S1 vertebral body, ranging in size from 0.7-3.0 cm in size.  Ischial and right femur were also involved, as well as a large iliac mass measuring about 15 cm in the uterus. 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   Radiation Oncology consultation was performed 03/11/2014, and she was given radiation in 1 dose of 6 Gy to the right iliac lesion.        With the initial diagnosis, she initiated treatment with 6 cycles of CAF neoadjuvant chemotherapy 02/14, 07/07, 03/28, 04/18/14, 05/08 and 05/29/14. She also received monthly zoledronic acid.  She then underwent a modified left mastectomy of Palacios type and left lymphadenoectomy on 06/27/2014.   Pathologic examination showed number at [facility] was [id number] showed infiltrative grade 2 ductal cancer with 6 level 1 metastatic lympFollow up with Jossie for visits and trastuzumab on 2-5, 2-26, 3-19 with CBC, CMP.  Echocardiogram on  3-19.  Follow up with me 4-9 with CBC, CMP, CA27.29 and CEA and with CT CAP on 4-8.h nodes and 3 level 2 metastatic lymph nodes.  The differentiation was intermediate.  The tumor was ER positive 70% of the cells, RI positive in 40% of the cells and HER2 was 3+ by immunohistochemistry and the Ki-67 labeling index was 20%. Her staging after surgery was stage IV, pT4b N2 M1.  I don't see a biopsy of the skeletal metastases.  She then had continuation with chemotherapy with 4 cycles of Herceptin and taxane with monthly zoledronic acid.  Tamoxifen was initiated.  She then had two years of Herceptin and a decision was made not to continue further Herceptin.  She continued on zoledronic acid every 3 months. She was clinically stable. [place] then [place] the [place] new refugee from [facility].  She arrived last month, established [place] residency and now seeks further oncology care.       TREATMENT HISTORY:  A. Initial diagnosis with metastatic breast cancer in [facility].  Neoadjuvant CAF x 6.   B. Left mastectomy. Left axillary node dissection.  C.  Radiation in 1 dose to R iliac region.  C. Herceptin for 2 years taxane for a prescribed course then stopped, monthly zoledronic acid.  She had 2 years of Herceptin with tamoxifen added after chemotherapy.  D.  Tamoxifen alone and zoledronic acid every 3 months.  [place]  [place]  We restarted Herceptin every 3 weeks and continued tamoxifen. Bone targeted agent changed to denosumab every 9 weeks.       INTERVAL HISTORY:  Hoda returns to clinic and has been feeling entirely well.  She denies pain, fatigue, depression or anxiety.      REVIEW OF SYSTEMS:  A 10-point review of systems is entirely negative.  She continues on tamoxifen and Herceptin.  She has had no vaginal bleeding.  The plan is to change over from tamoxifen to letrozole in 09/2019.      PHYSICAL EXAMINATION:    VITAL SIGNS:  Blood pressure 113/61, pulse 83, respirations 18, O2 sat 96% on room air, temperature  98.3, weight 77.5 kg, height 1.6 meters, O2 sat 96% on room air.   GENERAL:  Hoda appeared generally well.  She has no alopecia.   HEENT:  Oropharynx is without lesions.   LYMPH:  There is no palpable cervical, supraclavicular, subclavicular or axillary lymphadenopathy.   BREASTS:  Examination of the right breast reveals no masses.  Examination of the left mastectomy incision is well healed without erythema or masses.   LUNGS:  Clear to percussion and auscultation.   HEART:  There is regular rate and rhythm, S1, S2.   ABDOMEN:  Soft and nontender, without hepatosplenomegaly.   EXTREMITIES:  Without edema.   PSYCHIATRIC:  Mood and affect were normal.      LABORATORY DATA:  The CBC and CMP were remarkable for calcium of 8.2 and a hemoglobin of 11.3.      IMAGING:  A CT scan of the chest, abdomen and pelvis showed postsurgical changes of left mastectomy and left axillary lymph node dissection without evidence of local recurrence, stable diffuse osseous metastases and stable small pulmonary nodules.      ASSESSMENT AND PLAN:   1.  Hoda Brush is a 64-year-old woman with a history of ER-positive, MS-positive, HER2-positive breast cancer.  She is from [facility], formally from [facility], and she came [place]  With [place].  She is here for continuation of every 3-week Herceptin, which she received along with tamoxifen daily.  The tumor was ER positive, MS positive, HER2 positive.  She had metastatic disease at the time of presentation with bone-only metastases by report.  She presented with metastatic disease in 02/2014.  Staging initially showed bone-only metastases.  She had a right hip metastasis.  She underwent neoadjuvant CAF, had a left mastectomy, left axillary lymph node dissection, radiation to the right hip, which included the right iliac region where she had metastatic disease.  She continues on tamoxifen and every every-3-week trastuzumab, and has had no evidence of disease progression for  the last 2-3/4 years.   2.  Discussion of hormonal therapy.  After the 5 years of tamoxifen that will be completed in 09/2019, we will change her hormonal therapy to an aromatase inhibitor, which will decrease risk of pulmonary embolus and uterine cancer.  We will continue the letrozole indefinitely.  We will monitor her bone density given her oligometastatic disease, which has been stable for at least 5 years.   3.  Discussion of Bone health.  I recommended continuing with calcium and vitamin D.  We will continue with Xgeva every 84 days or every fourth trastuzumab treatment.   4.  Followup.  We will have the Hodasveta Sethi in followup in clinic in 3 weeks.        Thank you for allowing us to continue to participate in Hoda Gonsalo's care.      Lisandro Aguiar MD      [facility]       I spent 25 minutes with the patient more than 50% of which was in counseling and coordination of care.

## 2019-07-22 ENCOUNTER — ANCILLARY PROCEDURE (OUTPATIENT)
Dept: CT IMAGING | Facility: CLINIC | Age: 64
End: 2019-07-22
Attending: INTERNAL MEDICINE
Payer: COMMERCIAL

## 2019-07-22 DIAGNOSIS — C50.912 MALIGNANT NEOPLASM OF LEFT FEMALE BREAST, UNSPECIFIED ESTROGEN RECEPTOR STATUS, UNSPECIFIED SITE OF BREAST (H): ICD-10-CM

## 2019-07-22 RX ORDER — HEPARIN SODIUM (PORCINE) LOCK FLUSH IV SOLN 100 UNIT/ML 100 UNIT/ML
5 SOLUTION INTRAVENOUS ONCE
Status: COMPLETED | OUTPATIENT
Start: 2019-07-22 | End: 2019-07-22

## 2019-07-22 RX ORDER — IOPAMIDOL 755 MG/ML
100 INJECTION, SOLUTION INTRAVASCULAR ONCE
Status: COMPLETED | OUTPATIENT
Start: 2019-07-22 | End: 2019-07-22

## 2019-07-22 RX ADMIN — HEPARIN SODIUM (PORCINE) LOCK FLUSH IV SOLN 100 UNIT/ML 5 ML: 100 SOLUTION at 14:43

## 2019-07-22 RX ADMIN — IOPAMIDOL 100 ML: 755 INJECTION, SOLUTION INTRAVASCULAR at 14:15

## 2019-07-22 NOTE — DISCHARGE INSTRUCTIONS

## 2019-07-23 ENCOUNTER — INFUSION THERAPY VISIT (OUTPATIENT)
Dept: ONCOLOGY | Facility: CLINIC | Age: 64
End: 2019-07-23
Attending: INTERNAL MEDICINE
Payer: COMMERCIAL

## 2019-07-23 ENCOUNTER — APPOINTMENT (OUTPATIENT)
Dept: LAB | Facility: CLINIC | Age: 64
End: 2019-07-23
Attending: INTERNAL MEDICINE
Payer: COMMERCIAL

## 2019-07-23 VITALS
SYSTOLIC BLOOD PRESSURE: 113 MMHG | RESPIRATION RATE: 18 BRPM | HEIGHT: 63 IN | WEIGHT: 170.8 LBS | HEART RATE: 83 BPM | DIASTOLIC BLOOD PRESSURE: 61 MMHG | TEMPERATURE: 98.3 F | BODY MASS INDEX: 30.26 KG/M2 | OXYGEN SATURATION: 96 %

## 2019-07-23 DIAGNOSIS — C50.912 MALIGNANT NEOPLASM OF LEFT FEMALE BREAST, UNSPECIFIED ESTROGEN RECEPTOR STATUS, UNSPECIFIED SITE OF BREAST (H): Primary | ICD-10-CM

## 2019-07-23 DIAGNOSIS — Z51.11 ENCOUNTER FOR ANTINEOPLASTIC CHEMOTHERAPY: ICD-10-CM

## 2019-07-23 LAB
ALBUMIN SERPL-MCNC: 3.3 G/DL (ref 3.4–5)
ALP SERPL-CCNC: 42 U/L (ref 40–150)
ALT SERPL W P-5'-P-CCNC: 35 U/L (ref 0–50)
ANION GAP SERPL CALCULATED.3IONS-SCNC: 4 MMOL/L (ref 3–14)
AST SERPL W P-5'-P-CCNC: 30 U/L (ref 0–45)
BASOPHILS # BLD AUTO: 0 10E9/L (ref 0–0.2)
BASOPHILS NFR BLD AUTO: 0.6 %
BILIRUB SERPL-MCNC: 0.2 MG/DL (ref 0.2–1.3)
BUN SERPL-MCNC: 17 MG/DL (ref 7–30)
CALCIUM SERPL-MCNC: 8.2 MG/DL (ref 8.5–10.1)
CANCER AG27-29 SERPL-ACNC: 10 U/ML (ref 0–39)
CEA SERPL-MCNC: 0.5 UG/L (ref 0–2.5)
CHLORIDE SERPL-SCNC: 107 MMOL/L (ref 94–109)
CO2 SERPL-SCNC: 29 MMOL/L (ref 20–32)
CREAT SERPL-MCNC: 0.9 MG/DL (ref 0.52–1.04)
DIFFERENTIAL METHOD BLD: ABNORMAL
EOSINOPHIL # BLD AUTO: 0.3 10E9/L (ref 0–0.7)
EOSINOPHIL NFR BLD AUTO: 4 %
ERYTHROCYTE [DISTWIDTH] IN BLOOD BY AUTOMATED COUNT: 13.3 % (ref 10–15)
GFR SERPL CREATININE-BSD FRML MDRD: 68 ML/MIN/{1.73_M2}
GLUCOSE SERPL-MCNC: 90 MG/DL (ref 70–99)
HCT VFR BLD AUTO: 35.2 % (ref 35–47)
HGB BLD-MCNC: 11.3 G/DL (ref 11.7–15.7)
IMM GRANULOCYTES # BLD: 0 10E9/L (ref 0–0.4)
IMM GRANULOCYTES NFR BLD: 0.1 %
LYMPHOCYTES # BLD AUTO: 2.4 10E9/L (ref 0.8–5.3)
LYMPHOCYTES NFR BLD AUTO: 35 %
MCH RBC QN AUTO: 30.3 PG (ref 26.5–33)
MCHC RBC AUTO-ENTMCNC: 32.1 G/DL (ref 31.5–36.5)
MCV RBC AUTO: 94 FL (ref 78–100)
MONOCYTES # BLD AUTO: 0.4 10E9/L (ref 0–1.3)
MONOCYTES NFR BLD AUTO: 6.3 %
NEUTROPHILS # BLD AUTO: 3.7 10E9/L (ref 1.6–8.3)
NEUTROPHILS NFR BLD AUTO: 54 %
NRBC # BLD AUTO: 0 10*3/UL
NRBC BLD AUTO-RTO: 0 /100
PLATELET # BLD AUTO: 210 10E9/L (ref 150–450)
POTASSIUM SERPL-SCNC: 4 MMOL/L (ref 3.4–5.3)
PROT SERPL-MCNC: 7.2 G/DL (ref 6.8–8.8)
RBC # BLD AUTO: 3.73 10E12/L (ref 3.8–5.2)
SODIUM SERPL-SCNC: 140 MMOL/L (ref 133–144)
WBC # BLD AUTO: 6.9 10E9/L (ref 4–11)

## 2019-07-23 PROCEDURE — 82378 CARCINOEMBRYONIC ANTIGEN: CPT | Performed by: INTERNAL MEDICINE

## 2019-07-23 PROCEDURE — 25000128 H RX IP 250 OP 636: Mod: ZF | Performed by: INTERNAL MEDICINE

## 2019-07-23 PROCEDURE — 80053 COMPREHEN METABOLIC PANEL: CPT | Performed by: INTERNAL MEDICINE

## 2019-07-23 PROCEDURE — 99214 OFFICE O/P EST MOD 30 MIN: CPT | Mod: ZP | Performed by: INTERNAL MEDICINE

## 2019-07-23 PROCEDURE — 85025 COMPLETE CBC W/AUTO DIFF WBC: CPT | Performed by: INTERNAL MEDICINE

## 2019-07-23 PROCEDURE — 96413 CHEMO IV INFUSION 1 HR: CPT

## 2019-07-23 PROCEDURE — 86300 IMMUNOASSAY TUMOR CA 15-3: CPT | Performed by: INTERNAL MEDICINE

## 2019-07-23 PROCEDURE — G0463 HOSPITAL OUTPT CLINIC VISIT: HCPCS | Mod: ZF

## 2019-07-23 PROCEDURE — 25800030 ZZH RX IP 258 OP 636: Mod: ZF | Performed by: INTERNAL MEDICINE

## 2019-07-23 RX ORDER — HEPARIN SODIUM (PORCINE) LOCK FLUSH IV SOLN 100 UNIT/ML 100 UNIT/ML
5 SOLUTION INTRAVENOUS EVERY 8 HOURS
Status: CANCELLED | OUTPATIENT
Start: 2019-07-23

## 2019-07-23 RX ORDER — DIPHENHYDRAMINE HYDROCHLORIDE 50 MG/ML
50 INJECTION INTRAMUSCULAR; INTRAVENOUS
Status: CANCELLED
Start: 2019-07-23

## 2019-07-23 RX ORDER — METHYLPREDNISOLONE SODIUM SUCCINATE 125 MG/2ML
125 INJECTION, POWDER, LYOPHILIZED, FOR SOLUTION INTRAMUSCULAR; INTRAVENOUS
Status: CANCELLED
Start: 2019-07-23

## 2019-07-23 RX ORDER — HEPARIN SODIUM (PORCINE) LOCK FLUSH IV SOLN 100 UNIT/ML 100 UNIT/ML
5 SOLUTION INTRAVENOUS EVERY 8 HOURS
Status: DISCONTINUED | OUTPATIENT
Start: 2019-07-23 | End: 2019-07-23 | Stop reason: HOSPADM

## 2019-07-23 RX ORDER — LORAZEPAM 2 MG/ML
0.5 INJECTION INTRAMUSCULAR EVERY 4 HOURS PRN
Status: CANCELLED
Start: 2019-07-23

## 2019-07-23 RX ORDER — ALBUTEROL SULFATE 0.83 MG/ML
2.5 SOLUTION RESPIRATORY (INHALATION)
Status: CANCELLED | OUTPATIENT
Start: 2019-07-23

## 2019-07-23 RX ORDER — MEPERIDINE HYDROCHLORIDE 25 MG/ML
25 INJECTION INTRAMUSCULAR; INTRAVENOUS; SUBCUTANEOUS EVERY 30 MIN PRN
Status: CANCELLED | OUTPATIENT
Start: 2019-07-23

## 2019-07-23 RX ORDER — ACETAMINOPHEN 325 MG/1
650 TABLET ORAL
Status: CANCELLED | OUTPATIENT
Start: 2019-07-23

## 2019-07-23 RX ORDER — HEPARIN SODIUM (PORCINE) LOCK FLUSH IV SOLN 100 UNIT/ML 100 UNIT/ML
5 SOLUTION INTRAVENOUS EVERY 8 HOURS PRN
Status: DISCONTINUED | OUTPATIENT
Start: 2019-07-23 | End: 2019-07-26 | Stop reason: HOSPADM

## 2019-07-23 RX ORDER — DIPHENHYDRAMINE HCL 25 MG
50 CAPSULE ORAL ONCE
Status: CANCELLED
Start: 2019-07-23

## 2019-07-23 RX ORDER — SODIUM CHLORIDE 9 MG/ML
1000 INJECTION, SOLUTION INTRAVENOUS CONTINUOUS PRN
Status: CANCELLED
Start: 2019-07-23

## 2019-07-23 RX ORDER — EPINEPHRINE 1 MG/ML
0.3 INJECTION, SOLUTION INTRAMUSCULAR; SUBCUTANEOUS EVERY 5 MIN PRN
Status: CANCELLED | OUTPATIENT
Start: 2019-07-23

## 2019-07-23 RX ORDER — ALBUTEROL SULFATE 90 UG/1
1-2 AEROSOL, METERED RESPIRATORY (INHALATION)
Status: CANCELLED
Start: 2019-07-23

## 2019-07-23 RX ORDER — EPINEPHRINE 0.3 MG/.3ML
0.3 INJECTION SUBCUTANEOUS EVERY 5 MIN PRN
Status: CANCELLED | OUTPATIENT
Start: 2019-07-23

## 2019-07-23 RX ADMIN — HEPARIN 5 ML: 100 SYRINGE at 14:02

## 2019-07-23 RX ADMIN — TRASTUZUMAB 450 MG: 150 INJECTION, POWDER, LYOPHILIZED, FOR SOLUTION INTRAVENOUS at 16:10

## 2019-07-23 RX ADMIN — HEPARIN 5 ML: 100 SYRINGE at 16:41

## 2019-07-23 ASSESSMENT — PAIN SCALES - GENERAL: PAINLEVEL: NO PAIN (0)

## 2019-07-23 ASSESSMENT — MIFFLIN-ST. JEOR: SCORE: 1298.71

## 2019-07-23 NOTE — PATIENT INSTRUCTIONS
[facility] Triage and after hours / weekends / holidays:  [phone]    Please call the triage or after hours line if you experience a temperature greater than or equal to 100.5, shaking chills, have uncontrolled nausea, vomiting and/or diarrhea, dizziness, shortness of breath, chest pain, bleeding, unexplained bruising, or if you have any other new/concerning symptoms, questions or concerns.      If you are having any concerning symptoms or wish to speak to a provider before your next infusion visit, please call your care coordinator or triage to notify them so we can adequately serve you.     If you need a refill on a narcotic prescription or other medication, please call before your infusion appointment.       Recent Results (from the past 24 hour(s))   CBC with platelets differential    Collection Time: 07/23/19  2:06 PM   Result Value Ref Range    WBC 6.9 4.0 - 11.0 10e9/L    RBC Count 3.73 (L) 3.8 - 5.2 10e12/L    Hemoglobin 11.3 (L) 11.7 - 15.7 g/dL    Hematocrit 35.2 35.0 - 47.0 %    MCV 94 78 - 100 fl    MCH 30.3 26.5 - 33.0 pg    MCHC 32.1 31.5 - 36.5 g/dL    RDW 13.3 10.0 - 15.0 %    Platelet Count 210 150 - 450 10e9/L    Diff Method Automated Method     % Neutrophils 54.0 %    % Lymphocytes 35.0 %    % Monocytes 6.3 %    % Eosinophils 4.0 %    % Basophils 0.6 %    % Immature Granulocytes 0.1 %    Nucleated RBCs 0 0 /100    Absolute Neutrophil 3.7 1.6 - 8.3 10e9/L    Absolute Lymphocytes 2.4 0.8 - 5.3 10e9/L    Absolute Monocytes 0.4 0.0 - 1.3 10e9/L    Absolute Eosinophils 0.3 0.0 - 0.7 10e9/L    Absolute Basophils 0.0 0.0 - 0.2 10e9/L    Abs Immature Granulocytes 0.0 0 - 0.4 10e9/L    Absolute Nucleated RBC 0.0    Comprehensive metabolic panel    Collection Time: 07/23/19  2:06 PM   Result Value Ref Range    Sodium 140 133 - 144 mmol/L    Potassium 4.0 3.4 - 5.3 mmol/L    Chloride 107 94 - 109 mmol/L    Carbon Dioxide 29 20 - 32 mmol/L    Anion Gap 4 3 - 14 mmol/L    Glucose 90 70 - 99 mg/dL    Urea  Nitrogen 17 7 - 30 mg/dL    Creatinine 0.90 0.52 - 1.04 mg/dL    GFR Estimate 68 >60 mL/min/[1.73_m2]    GFR Estimate If Black 78 >60 mL/min/[1.73_m2]    Calcium 8.2 (L) 8.5 - 10.1 mg/dL    Bilirubin Total 0.2 0.2 - 1.3 mg/dL    Albumin 3.3 (L) 3.4 - 5.0 g/dL    Protein Total 7.2 6.8 - 8.8 g/dL    Alkaline Phosphatase 42 40 - 150 U/L    ALT 35 0 - 50 U/L    AST 30 0 - 45 U/L

## 2019-07-23 NOTE — NURSING NOTE
"Oncology Rooming Note    July 23, 2019 2:22 PM   Hoda Brush is a 64 year old female who presents for:    Chief Complaint   Patient presents with     Port Draw     Labs drawn via port by RN in lab. Line flushed and hep locked. VS taken.     Oncology Clinic Visit     RETURN VISIT; BREAST CA FOLLOW UP      Initial Vitals: /61 (BP Location: Right arm, Patient Position: Sitting, Cuff Size: Adult Regular)   Pulse 83   Temp 98.3  F (36.8  C) (Oral)   Resp 18   Ht 1.6 m (5' 2.99\")   Wt 77.5 kg (170 lb 12.8 oz)   SpO2 96%   BMI 30.27 kg/m   Estimated body mass index is 30.27 kg/m  as calculated from the following:    Height as of this encounter: 1.6 m (5' 2.99\").    Weight as of this encounter: 77.5 kg (170 lb 12.8 oz). Body surface area is 1.86 meters squared.  No Pain (0) Comment: Data Unavailable   No LMP recorded. Patient is postmenopausal.  Allergies reviewed: Yes  Medications reviewed: Yes    Medications: MEDICATION REFILLS NEEDED TODAY. Provider was notified. Patient needs a refill on Melatonin     Pharmacy name entered into [employer]: [facility] [address] [place] [address] [address] AT  [address] [facility]    Clinical concerns: No new concerns today  Dr Aguiar was notified.      Marycarmen Roberts              "

## 2019-07-23 NOTE — LETTER
7/23/2019       RE: Hoda Brush  [address] [facility] [address]     Dear Colleague,    Thank you for referring your patient, Hoda Brush, to the [facility]. Please see a copy of my visit note below.    Hoda is seen with a Mauritian . The patient is a refugee from [facility] and is here for continuation of care for her metastatic ER+HER2- breast cancer.  She is a Amish refugee from [facility] and was seen in clinic with her daughter.  The only data we have from [facility] is an English translation of her records.       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  On 02/11/2014, she was seen by an oncologist.  The clinical staging of T4b N2 M1 was noted.   Her breast cancer was on the left side. There was no right breast cancer, confirmed by the patient and her daughter, correcting a possible error in the translated records.  ER was positive in 100% of the cells, MA at 14% and HER2 was 3+ positive.  It appears that this histopathologic information is on the mastectomy specimen. She underwent an MRI for staging of presumptive bone metastases which was performed 03/02/2014.  There were skeletal metastases in the thoracic vertebrae at 12, L1, L3, L5 and S1 vertebral body, ranging in size from 0.7-3.0 cm in size.  Ischial and right femur were also involved, as well as a large iliac mass measuring about 15 cm in the uterus. 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   Radiation Oncology consultation was performed 03/11/2014, and she was given radiation in 1 dose of 6 Gy to the right iliac lesion.        With the initial diagnosis, she initiated treatment with 6 cycles of CAF neoadjuvant chemotherapy 02/14, 07/07, 03/28, 04/18/14, 05/08 and 05/29/14. She also received monthly zoledronic acid.  She then underwent a modified left mastectomy of Palacios type and left lymphadenoectomy on  06/27/2014.   Pathologic examination showed number at [facility] was [id number] showed infiltrative grade 2 ductal cancer with 6 level 1 metastatic lympFollow up with Jossie for visits and trastuzumab on 2-5, 2-26, 3-19 with CBC, CMP.  Echocardiogram on 3-19.  Follow up with me 4-9 with CBC, CMP, CA27.29 and CEA and with CT CAP on 4-8.h nodes and 3 level 2 metastatic lymph nodes.  The differentiation was intermediate.  The tumor was ER positive 70% of the cells, MO positive in 40% of the cells and HER2 was 3+ by immunohistochemistry and the Ki-67 labeling index was 20%. Her staging after surgery was stage IV, pT4b N2 M1.  I don't see a biopsy of the skeletal metastases.  She then had continuation with chemotherapy with 4 cycles of Herceptin and taxane with monthly zoledronic acid.  Tamoxifen was initiated.  She then had two years of Herceptin and a decision was made not to continue further Herceptin.  She continued on zoledronic acid every 3 months. She was clinically stable. [place] then [place] the [place] new refugee from [facility].  She arrived last month, established [place] residency and now seeks further oncology care.       TREATMENT HISTORY:  A. Initial diagnosis with metastatic breast cancer in [facility].  Neoadjuvant CAF x 6.   B. Left mastectomy. Left axillary node dissection.  C.  Radiation in 1 dose to R iliac region.  C. Herceptin for 2 years taxane for a prescribed course then stopped, monthly zoledronic acid.  She had 2 years of Herceptin with tamoxifen added after chemotherapy.  D.  Tamoxifen alone and zoledronic acid every 3 months.  E.  [place]  We restarted Herceptin every 3 weeks and continued tamoxifen. Bone targeted agent changed to denosumab every 9 weeks.       INTERVAL HISTORY:  Hoda returns to clinic and has been feeling entirely well.  She denies pain, fatigue, depression or anxiety.      REVIEW OF SYSTEMS:  A 10-point review of systems is entirely negative.  She continues  on tamoxifen and Herceptin.  She has had no vaginal bleeding.  The plan is to change over from tamoxifen to letrozole in 09/2019.      PHYSICAL EXAMINATION:    VITAL SIGNS:  Blood pressure 113/61, pulse 83, respirations 18, O2 sat 96% on room air, temperature 98.3, weight 77.5 kg, height 1.6 meters, O2 sat 96% on room air.   GENERAL:  Hoda appeared generally well.  She has no alopecia.   HEENT:  Oropharynx is without lesions.   LYMPH:  There is no palpable cervical, supraclavicular, subclavicular or axillary lymphadenopathy.   BREASTS:  Examination of the right breast reveals no masses.  Examination of the left mastectomy incision is well healed without erythema or masses.   LUNGS:  Clear to percussion and auscultation.   HEART:  There is regular rate and rhythm, S1, S2.   ABDOMEN:  Soft and nontender, without hepatosplenomegaly.   EXTREMITIES:  Without edema.   PSYCHIATRIC:  Mood and affect were normal.      LABORATORY DATA:  The CBC and CMP were remarkable for calcium of 8.2 and a hemoglobin of 11.3.      IMAGING:  A CT scan of the chest, abdomen and pelvis showed postsurgical changes of left mastectomy and left axillary lymph node dissection without evidence of local recurrence, stable diffuse osseous metastases and stable small pulmonary nodules.      ASSESSMENT AND PLAN:   1.  Hoda Brush is a 64-year-old woman with a history of ER-positive, SC-positive, HER2-positive breast cancer.  She is from [facility], formally from [facility], and she came [place]  With her daughter.  She is here for continuation of every 3-week Herceptin, which she received along with tamoxifen daily.  The tumor was ER positive, SC positive, HER2 positive.  She had metastatic disease at the time of presentation with bone-only metastases by report.  She presented with metastatic disease in 02/2014.  Staging initially showed bone-only metastases.  She had a right hip metastasis.  She underwent neoadjuvant CAF, had a left  mastectomy, left axillary lymph node dissection, radiation to the right hip, which included the right iliac region where she had metastatic disease.  She continues on tamoxifen and every every-3-week trastuzumab, and has had no evidence of disease progression for the last 2-3/4 years.   2.  Discussion of hormonal therapy.  After the 5 years of tamoxifen that will be completed in 09/2019, we will change her hormonal therapy to an aromatase inhibitor, which will decrease risk of pulmonary embolus and uterine cancer.  We will continue the letrozole indefinitely.  We will monitor her bone density given her oligometastatic disease, which has been stable for at least 5 years.   3.  Discussion of Bone health.  I recommended continuing with calcium and vitamin D.  We will continue with Xgeva every 84 days or every fourth trastuzumab treatment.   4.  Followup.  We will have the Hodasveta Sethi in followup in clinic in 3 weeks. Follow up with trastuzumab and visit with Jossei on 4-30, 5-21. Follow up with me and trastuzumab on 6-11. Follow up with Jossie on trastuzumab on 7-2. Follow up with me 7-23 with trastuzumab and CT CAP on 7-22. CBC, CMP, CA27.29 and CEA all visits.     Thank you for allowing us to continue to participate in Hoda Gonsalo's care.      Lisandro Aguiar MD      [facility]       I spent 25 minutes with the patient more than 50% of which was in counseling and coordination of care.

## 2019-07-23 NOTE — PROGRESS NOTES
Infusion Nursing Note:  Hoda KIMBLE Markteodoro presents today for Cycle 33 Day 1 Herceptin.    Patient seen by provider today: Yes: Dr. Aguiar   present during visit today: Yes, Language: Citizen of [place].     Note: No concerns at this time.    Intravenous Access:  Implanted Port.    Treatment Conditions:  Lab Results   Component Value Date    HGB 11.3 07/23/2019     Lab Results   Component Value Date    WBC 6.9 07/23/2019      Lab Results   Component Value Date    ANEU 3.7 07/23/2019     Lab Results   Component Value Date     07/23/2019      Results reviewed, labs MET treatment parameters, ok to proceed with treatment.  ECHO/MUGA completed 6/11/19  EF 62%.      Post Infusion Assessment:  Patient tolerated infusion without incident.  Blood return noted pre and post infusion.  Access discontinued per protocol.       Discharge Plan:   Patient declined prescription refills.  Discharge instructions reviewed with: Patient.  Patient verbalized understanding of discharge instructions and all questions answered.  AVS to patient via [facility]. Patient will return in three weeks for next appointment. Patient aware to check MyChart for appointment updates.  Patient discharged in stable condition accompanied by: self.  Face to Face time: 0.    CECILIA SANCHEZ RN

## 2019-08-10 RX ORDER — EPINEPHRINE 1 MG/ML
0.3 INJECTION, SOLUTION INTRAMUSCULAR; SUBCUTANEOUS EVERY 5 MIN PRN
Status: CANCELLED | OUTPATIENT
Start: 2019-08-13

## 2019-08-10 RX ORDER — HEPARIN SODIUM (PORCINE) LOCK FLUSH IV SOLN 100 UNIT/ML 100 UNIT/ML
5 SOLUTION INTRAVENOUS EVERY 8 HOURS
Status: CANCELLED | OUTPATIENT
Start: 2019-08-13

## 2019-08-10 RX ORDER — LORAZEPAM 2 MG/ML
0.5 INJECTION INTRAMUSCULAR EVERY 4 HOURS PRN
Status: CANCELLED
Start: 2019-08-13

## 2019-08-10 RX ORDER — ACETAMINOPHEN 325 MG/1
650 TABLET ORAL
Status: CANCELLED | OUTPATIENT
Start: 2019-08-13

## 2019-08-10 RX ORDER — MEPERIDINE HYDROCHLORIDE 25 MG/ML
25 INJECTION INTRAMUSCULAR; INTRAVENOUS; SUBCUTANEOUS EVERY 30 MIN PRN
Status: CANCELLED | OUTPATIENT
Start: 2019-08-13

## 2019-08-10 RX ORDER — SODIUM CHLORIDE 9 MG/ML
1000 INJECTION, SOLUTION INTRAVENOUS CONTINUOUS PRN
Status: CANCELLED
Start: 2019-08-13

## 2019-08-10 RX ORDER — ALBUTEROL SULFATE 0.83 MG/ML
2.5 SOLUTION RESPIRATORY (INHALATION)
Status: CANCELLED | OUTPATIENT
Start: 2019-08-13

## 2019-08-10 RX ORDER — ALBUTEROL SULFATE 90 UG/1
1-2 AEROSOL, METERED RESPIRATORY (INHALATION)
Status: CANCELLED
Start: 2019-08-13

## 2019-08-10 RX ORDER — DIPHENHYDRAMINE HYDROCHLORIDE 50 MG/ML
50 INJECTION INTRAMUSCULAR; INTRAVENOUS
Status: CANCELLED
Start: 2019-08-13

## 2019-08-10 RX ORDER — EPINEPHRINE 0.3 MG/.3ML
0.3 INJECTION SUBCUTANEOUS EVERY 5 MIN PRN
Status: CANCELLED | OUTPATIENT
Start: 2019-08-13

## 2019-08-10 RX ORDER — DIPHENHYDRAMINE HCL 25 MG
50 CAPSULE ORAL ONCE
Status: CANCELLED
Start: 2019-08-13

## 2019-08-10 RX ORDER — METHYLPREDNISOLONE SODIUM SUCCINATE 125 MG/2ML
125 INJECTION, POWDER, LYOPHILIZED, FOR SOLUTION INTRAMUSCULAR; INTRAVENOUS
Status: CANCELLED
Start: 2019-08-13

## 2019-08-13 ENCOUNTER — INFUSION THERAPY VISIT (OUTPATIENT)
Dept: ONCOLOGY | Facility: CLINIC | Age: 64
End: 2019-08-13
Attending: INTERNAL MEDICINE
Payer: COMMERCIAL

## 2019-08-13 ENCOUNTER — ONCOLOGY VISIT (OUTPATIENT)
Dept: ONCOLOGY | Facility: CLINIC | Age: 64
End: 2019-08-13
Attending: PHYSICIAN ASSISTANT
Payer: COMMERCIAL

## 2019-08-13 VITALS
TEMPERATURE: 97.7 F | SYSTOLIC BLOOD PRESSURE: 107 MMHG | BODY MASS INDEX: 29.96 KG/M2 | DIASTOLIC BLOOD PRESSURE: 67 MMHG | HEART RATE: 76 BPM | WEIGHT: 169.1 LBS | OXYGEN SATURATION: 96 % | RESPIRATION RATE: 20 BRPM

## 2019-08-13 DIAGNOSIS — C50.919 METASTATIC BREAST CANCER: Primary | ICD-10-CM

## 2019-08-13 DIAGNOSIS — C50.912 MALIGNANT NEOPLASM OF LEFT FEMALE BREAST, UNSPECIFIED ESTROGEN RECEPTOR STATUS, UNSPECIFIED SITE OF BREAST (H): Primary | ICD-10-CM

## 2019-08-13 LAB
ALBUMIN SERPL-MCNC: 3.3 G/DL (ref 3.4–5)
ALP SERPL-CCNC: 38 U/L (ref 40–150)
ALT SERPL W P-5'-P-CCNC: 43 U/L (ref 0–50)
ANION GAP SERPL CALCULATED.3IONS-SCNC: 4 MMOL/L (ref 3–14)
AST SERPL W P-5'-P-CCNC: 41 U/L (ref 0–45)
BASOPHILS # BLD AUTO: 0 10E9/L (ref 0–0.2)
BASOPHILS NFR BLD AUTO: 0.5 %
BILIRUB SERPL-MCNC: 0.2 MG/DL (ref 0.2–1.3)
BUN SERPL-MCNC: 14 MG/DL (ref 7–30)
CALCIUM SERPL-MCNC: 8.5 MG/DL (ref 8.5–10.1)
CANCER AG27-29 SERPL-ACNC: 12 U/ML (ref 0–39)
CEA SERPL-MCNC: 0.6 UG/L (ref 0–2.5)
CHLORIDE SERPL-SCNC: 110 MMOL/L (ref 94–109)
CO2 SERPL-SCNC: 27 MMOL/L (ref 20–32)
CREAT SERPL-MCNC: 0.8 MG/DL (ref 0.52–1.04)
DIFFERENTIAL METHOD BLD: ABNORMAL
EOSINOPHIL # BLD AUTO: 0.2 10E9/L (ref 0–0.7)
EOSINOPHIL NFR BLD AUTO: 3 %
ERYTHROCYTE [DISTWIDTH] IN BLOOD BY AUTOMATED COUNT: 13.4 % (ref 10–15)
GFR SERPL CREATININE-BSD FRML MDRD: 78 ML/MIN/{1.73_M2}
GLUCOSE SERPL-MCNC: 78 MG/DL (ref 70–99)
HCT VFR BLD AUTO: 37.3 % (ref 35–47)
HGB BLD-MCNC: 11.7 G/DL (ref 11.7–15.7)
IMM GRANULOCYTES # BLD: 0 10E9/L (ref 0–0.4)
IMM GRANULOCYTES NFR BLD: 0.1 %
LYMPHOCYTES # BLD AUTO: 2.7 10E9/L (ref 0.8–5.3)
LYMPHOCYTES NFR BLD AUTO: 34.5 %
MCH RBC QN AUTO: 29.7 PG (ref 26.5–33)
MCHC RBC AUTO-ENTMCNC: 31.4 G/DL (ref 31.5–36.5)
MCV RBC AUTO: 95 FL (ref 78–100)
MONOCYTES # BLD AUTO: 0.5 10E9/L (ref 0–1.3)
MONOCYTES NFR BLD AUTO: 6.4 %
NEUTROPHILS # BLD AUTO: 4.3 10E9/L (ref 1.6–8.3)
NEUTROPHILS NFR BLD AUTO: 55.5 %
NRBC # BLD AUTO: 0 10*3/UL
NRBC BLD AUTO-RTO: 0 /100
PLATELET # BLD AUTO: 211 10E9/L (ref 150–450)
POTASSIUM SERPL-SCNC: 4.1 MMOL/L (ref 3.4–5.3)
PROT SERPL-MCNC: 7.7 G/DL (ref 6.8–8.8)
RBC # BLD AUTO: 3.94 10E12/L (ref 3.8–5.2)
SODIUM SERPL-SCNC: 141 MMOL/L (ref 133–144)
WBC # BLD AUTO: 7.7 10E9/L (ref 4–11)

## 2019-08-13 PROCEDURE — 25000128 H RX IP 250 OP 636: Mod: ZF | Performed by: INTERNAL MEDICINE

## 2019-08-13 PROCEDURE — 96413 CHEMO IV INFUSION 1 HR: CPT

## 2019-08-13 PROCEDURE — G0463 HOSPITAL OUTPT CLINIC VISIT: HCPCS | Mod: ZF

## 2019-08-13 PROCEDURE — 25000128 H RX IP 250 OP 636: Mod: ZF | Performed by: PHYSICIAN ASSISTANT

## 2019-08-13 PROCEDURE — 82378 CARCINOEMBRYONIC ANTIGEN: CPT | Performed by: INTERNAL MEDICINE

## 2019-08-13 PROCEDURE — 85025 COMPLETE CBC W/AUTO DIFF WBC: CPT | Performed by: INTERNAL MEDICINE

## 2019-08-13 PROCEDURE — 25800030 ZZH RX IP 258 OP 636: Mod: ZF | Performed by: INTERNAL MEDICINE

## 2019-08-13 PROCEDURE — 99214 OFFICE O/P EST MOD 30 MIN: CPT | Mod: ZP | Performed by: PHYSICIAN ASSISTANT

## 2019-08-13 PROCEDURE — T1013 SIGN LANG/ORAL INTERPRETER: HCPCS | Mod: U3

## 2019-08-13 PROCEDURE — 86300 IMMUNOASSAY TUMOR CA 15-3: CPT | Performed by: INTERNAL MEDICINE

## 2019-08-13 PROCEDURE — 80053 COMPREHEN METABOLIC PANEL: CPT | Performed by: INTERNAL MEDICINE

## 2019-08-13 RX ORDER — HEPARIN SODIUM (PORCINE) LOCK FLUSH IV SOLN 100 UNIT/ML 100 UNIT/ML
5 SOLUTION INTRAVENOUS EVERY 8 HOURS
Status: DISCONTINUED | OUTPATIENT
Start: 2019-08-13 | End: 2019-08-13 | Stop reason: HOSPADM

## 2019-08-13 RX ADMIN — HEPARIN SODIUM (PORCINE) LOCK FLUSH IV SOLN 100 UNIT/ML 5 ML: 100 SOLUTION at 12:08

## 2019-08-13 RX ADMIN — HEPARIN SODIUM (PORCINE) LOCK FLUSH IV SOLN 100 UNIT/ML 5 ML: 100 SOLUTION at 13:56

## 2019-08-13 RX ADMIN — TRASTUZUMAB 450 MG: 150 INJECTION, POWDER, LYOPHILIZED, FOR SOLUTION INTRAVENOUS at 13:22

## 2019-08-13 ASSESSMENT — PAIN SCALES - GENERAL: PAINLEVEL: NO PAIN (0)

## 2019-08-13 NOTE — PATIENT INSTRUCTIONS
[facility] Triage and after hours / weekends / holidays:  [phone]    Please call the triage or after hours line if you experience a temperature greater than or equal to 100.5, shaking chills, have uncontrolled nausea, vomiting and/or diarrhea, dizziness, shortness of breath, chest pain, bleeding, unexplained bruising, or if you have any other new/concerning symptoms, questions or concerns.      If you are having any concerning symptoms or wish to speak to a provider before your next infusion visit, please call your care coordinator or triage to notify them so we can adequately serve you.     If you need a refill on a narcotic prescription or other medication, please call before your infusion appointment.                 August 2019 Sunday Monday Tuesday Wednesday Thursday Friday Saturday                       1     2     3       4     5     6     7     8     9     10       11     12     13    [facility] LAB DRAW  11:30 AM   (30 min.)   [facility]   [facility] Lab Draw    [facility] RETURN  11:55 AM   (90 min.)   Anne Lea PA-C   [facility] ONC INFUSION 60   1:00 PM   (60 min.)    ONCOLOGY INFUSION   [facility] 14     15     16     17       18     19     20     21     22     23     24       25     26     27     28     29     30     31                 September 2019 Sunday Monday Tuesday Wednesday Thursday Friday Saturday   1     2     3    ECHO COMPLETE  11:00 AM   (60 min.)   ECHCR2   [facility] Services    [facility] LAB DRAW  12:15 PM   (15 min.)   [facility] DRAW   [facility] Lab Draw    [facility] ONC INFUSION 120  12:30 PM   (120 min.)    ONCOLOGY INFUSION   [facility] Cancer [facility] 4     5     6     7       8     9     10     11     12     13     14       15     16     17     18     19     20     21       22     23     24    [facility] LAB DRAW  11:45 AM   (15 min.)   [facility] DRAW   [facility]  Lab Draw    [facility] RETURN  12:15 PM   (30 min.)   Lisandro Aguiar MD   [facility] 25     26     27     28       29     30                                             Recent Results (from the past 24 hour(s))   CBC with platelets differential    Collection Time: 08/13/19 12:08 PM   Result Value Ref Range    WBC 7.7 4.0 - 11.0 10e9/L    RBC Count 3.94 3.8 - 5.2 10e12/L    Hemoglobin 11.7 11.7 - 15.7 g/dL    Hematocrit 37.3 35.0 - 47.0 %    MCV 95 78 - 100 fl    MCH 29.7 26.5 - 33.0 pg    MCHC 31.4 (L) 31.5 - 36.5 g/dL    RDW 13.4 10.0 - 15.0 %    Platelet Count 211 150 - 450 10e9/L    Diff Method Automated Method     % Neutrophils 55.5 %    % Lymphocytes 34.5 %    % Monocytes 6.4 %    % Eosinophils 3.0 %    % Basophils 0.5 %    % Immature Granulocytes 0.1 %    Nucleated RBCs 0 0 /100    Absolute Neutrophil 4.3 1.6 - 8.3 10e9/L    Absolute Lymphocytes 2.7 0.8 - 5.3 10e9/L    Absolute Monocytes 0.5 0.0 - 1.3 10e9/L    Absolute Eosinophils 0.2 0.0 - 0.7 10e9/L    Absolute Basophils 0.0 0.0 - 0.2 10e9/L    Abs Immature Granulocytes 0.0 0 - 0.4 10e9/L    Absolute Nucleated RBC 0.0    Comprehensive metabolic panel    Collection Time: 08/13/19 12:08 PM   Result Value Ref Range    Sodium 141 133 - 144 mmol/L    Potassium 4.1 3.4 - 5.3 mmol/L    Chloride 110 (H) 94 - 109 mmol/L    Carbon Dioxide 27 20 - 32 mmol/L    Anion Gap 4 3 - 14 mmol/L    Glucose 78 70 - 99 mg/dL    Urea Nitrogen 14 7 - 30 mg/dL    Creatinine 0.80 0.52 - 1.04 mg/dL    GFR Estimate 78 >60 mL/min/[1.73_m2]    GFR Estimate If Black >90 >60 mL/min/[1.73_m2]    Calcium 8.5 8.5 - 10.1 mg/dL    Bilirubin Total 0.2 0.2 - 1.3 mg/dL    Albumin 3.3 (L) 3.4 - 5.0 g/dL    Protein Total 7.7 6.8 - 8.8 g/dL    Alkaline Phosphatase 38 (L) 40 - 150 U/L    ALT 43 0 - 50 U/L    AST 41 0 - 45 U/L   CA 27.29 Breast tumor marker    Collection Time: 08/13/19 12:08 PM   Result Value Ref Range    CA 27-29 12 0 - 39 U/mL   CEA    Collection Time: 08/13/19  12:08 PM   Result Value Ref Range    CEA 0.6 0 - 2.5 ug/L

## 2019-08-13 NOTE — NURSING NOTE
"Oncology Rooming Note    August 13, 2019 12:25 PM   Hoda Brush is a 64 year old female who presents for:    Chief Complaint   Patient presents with     Port Draw     Port labs collected by RN.      Oncology Clinic Visit     Return Visit for Breast Cancer     Initial Vitals: /67 (BP Location: Right arm, Patient Position: Sitting)   Pulse 76   Temp 97.7  F (36.5  C) (Oral)   Resp 20   Wt 76.7 kg (169 lb 1.6 oz)   SpO2 96%   BMI 29.96 kg/m   Estimated body mass index is 29.96 kg/m  as calculated from the following:    Height as of 7/23/19: 1.6 m (5' 2.99\").    Weight as of this encounter: 76.7 kg (169 lb 1.6 oz). Body surface area is 1.85 meters squared.  No Pain (0) Comment: Data Unavailable   No LMP recorded. Patient is postmenopausal.  Allergies reviewed: Yes  Medications reviewed: Yes    Medications: Medication refills not needed today.  Pharmacy name entered into Pharmacy Development: [id number] [address] AT  [address] [facility]    Clinical concerns: Patient here with an .  No questions or concerns to escalate today.   Anne was notified.      Jesusita Warren, RN, MSN              "

## 2019-08-13 NOTE — LETTER
8/13/2019      RE: Hoda Brush  [address] [facility] [address]       Oncology/Hematology Visit Note  Aug 13, 2019    Reason for Visit: follow up of ER positive, HER2 positive left metastatic breast cancer    History of Present Illness: Hoda Brush is a 64 year old female with ER positive, HER2 positive left metastatic breast cancer with bone mets.     TREATMENT HISTORY:  A. Initial diagnosis with metastatic breast cancer in [facility].  Neoadjuvant CAF x 6.   B. Left mastectomy. Left axillary node dissection.  C.  Radiation in 1 dose to R iliac region. g Gy.  C. Herceptin for 2 years taxane for a prescribed course then stopped, monthly zoledronic acid.  She had 2 years of Herceptin with tamoxifen added after chemotherapy.  D.  Tamoxifen alone and zoledronic acid every 3 months.  E.  [place]  We restarted Herceptin every 3 weeks and continued tamoxifen. Bone targeted agent changed to denosumab every 6 weeks.      She is from [facility], formerly from [facility], and [place]  She lives with her daughter and is here for continuation of every 3 week Herceptin, which she receives along with tamoxifen.  Her tumor is ER positive, KS positive, HER-2 positive.  She had metastatic disease at the time of presentation with bone-only metastases by report.  She presented with metastatic disease in 02/2014.  Staging was initially bone-only metastases by report.  She did her staging in 02/2014 that showed that she had stage IV disease, T4N2M1 invasive ductal carcinoma of the left breast.  She had a right hip metastasis.  She underwent neoadjuvant CAF, left mastectomy, left axillary lymph node dissection and radiation.  She had radiation of the right iliac region where she had metastatic disease.  Pathology report from [facility] shows the tumor to be positive for ER in 75% of the cells, positive for KS in 40% of the cells, and the HER-2 was 3+ positive by immunohistochemistry.  The  Ki-67 was 20%.  She had no evidence of nonbone metastatic disease on her PET/CT scan from 08/2017.    Restaging on 7/22/19 was stable.      Interval History:  Hoda is here for follow up today.  present. She is feeling well today. She continues to tolerate treatment well without any side effects. She has very minor fatigue when she goes home after Herceptin infusions which resolves by the next day. No SOB, CP, or edema. No nausea or difficulties with bowels or bladder. No fevers/chills, cough, or sore throat. Her lymphedema has been controlled. She has no concerns today. ROS otherwise negative.     Current Outpatient Medications   Medication Sig Dispense Refill     melatonin 3 MG tablet Take 1 tablet (3 mg) by mouth nightly as needed for sleep 30 tablet 11     order for DME Equipment being ordered: 2 Mastectomy bras and 1 prosthetheses. 2 Piece 0     order for DME L UE class 2 compression sleeve and gauntlet  Night garment  Bandaging supplies 1 each 1     tamoxifen (NOLVADEX) 20 MG tablet Take 1 tablet (20 mg) by mouth daily 90 tablet 3     VITAMIN D, CHOLECALCIFEROL, PO Take 1,000 Units by mouth daily         Physical Examination:  General: The patient is a pleasant female in no acute distress.  /67 (BP Location: Right arm, Patient Position: Sitting)   Pulse 76   Temp 97.7  F (36.5  C) (Oral)   Resp 20   Wt 76.7 kg (169 lb 1.6 oz)   SpO2 96%   BMI 29.96 kg/m     Wt Readings from Last 10 Encounters:   08/13/19 76.7 kg (169 lb 1.6 oz)   07/23/19 77.5 kg (170 lb 12.8 oz)   07/02/19 76.7 kg (169 lb 3.2 oz)   06/11/19 78.9 kg (173 lb 14.4 oz)   05/28/19 80.3 kg (177 lb)   05/21/19 80.3 kg (177 lb)   04/30/19 79.2 kg (174 lb 9.6 oz)   04/09/19 79.2 kg (174 lb 9.6 oz)   03/19/19 79.2 kg (174 lb 8 oz)   02/26/19 80 kg (176 lb 6.4 oz)     HEENT: EOMI, PERRL. Sclerae are anicteric. Oral mucosa is pink and moist with no lesions or thrush.   Lymph: No palpable cervical, supraclavicular, subclavicular or  axillary lymphadenopathy.   Heart: Regular rate and rhythm.   Lungs: Clear to auscultation bilaterally.   Abdomen: Bowel sounds present, soft, nontender with no palpable hepatosplenomegaly or masses.   Extremities: No lower extremity edema noted bilaterally.   Neuro: Cranial nerves II through XII are grossly intact.  Skin: No rashes, petechiae, or bruising noted on exposed skin.    Laboratory Data:   8/13/2019 12:08   WBC 7.7   Hemoglobin 11.7   Hematocrit 37.3   Platelet Count 211   RBC Count 3.94   MCV 95   MCH 29.7   MCHC 31.4 (L)   RDW 13.4   Diff Method Automated Method   % Neutrophils 55.5   % Lymphocytes 34.5   % Monocytes 6.4   % Eosinophils 3.0   % Basophils 0.5   % Immature Granulocytes 0.1   Nucleated RBCs 0   Absolute Neutrophil 4.3   Absolute Lymphocytes 2.7   Absolute Monocytes 0.5   Absolute Eosinophils 0.2   Absolute Basophils 0.0   Abs Immature Granulocytes 0.0   Absolute Nucleated RBC 0.0     CMP, CA 29-29, and CEA pending     Assessment and Plan:    1. Metastatic breast cancer, ER, NV, HER2+ positive: Was last treated in [facility] with Herceptin. We have continued Herceptin every 3 weeks as well as daily tamoxifen. CT CAP on 7/22/19 with stable disease. Echocardiogram on 6/11 with EF 62% and normal LV function. Tolerating well. Tumor markers stable  --Will proceed with Herceptin. Continue Tamoxifen. Plan to switch to letrozole ~9/2019  --Next echo due ~September 3rd    2. Bone metastasis: On xgeva every 6 weeks. On calcium + vitamin D. Calcium corrects to WNL. Cleared by dentist to proceed. Last dose 7/2/19. Next dose due in 3 weeks.       3. LUE lymphedema: Has been to lymphedema therapy and has compression sleeve. No recent issues.     Anne Lea PA-C  [facility]  [address], [place] [id number] [phone]

## 2019-08-13 NOTE — PROGRESS NOTES
Infusion Nursing Note:  Infusion Nursing Note:  Hoda Brush presents today for Cycle 34 Day 1 Herceptin.    Patient seen by provider today: Yes: Anne CHA   present during visit today: Yes, Language: Swiss.     Note: N/A.    Intravenous Access:  Implanted Port.    Treatment Conditions:  Lab Results   Component Value Date    HGB 11.7 08/13/2019     Lab Results   Component Value Date    WBC 7.7 08/13/2019      Lab Results   Component Value Date    ANEU 4.3 08/13/2019     Lab Results   Component Value Date     08/13/2019      Lab Results   Component Value Date     08/13/2019                   Lab Results   Component Value Date    POTASSIUM 4.1 08/13/2019           No results found for: MAG         Lab Results   Component Value Date    CR 0.80 08/13/2019                   Lab Results   Component Value Date    TAYLER 8.5 08/13/2019                Lab Results   Component Value Date    BILITOTAL 0.2 08/13/2019           Lab Results   Component Value Date    ALBUMIN 3.3 08/13/2019                    Lab Results   Component Value Date    ALT 43 08/13/2019           Lab Results   Component Value Date    AST 41 08/13/2019       Results reviewed, labs MET treatment parameters, ok to proceed with treatment.  ECHO/MUGA completed 6/11/19  EF 62%.      Post Infusion Assessment:  Patient tolerated infusion without incident.  Blood return noted pre and post infusion.  Access discontinued per protocol.       Discharge Plan:   Patient declined prescription refills.  Discharge instructions reviewed with: Patient.  Copy of AVS reviewed with patient and/or family.  Patient will return 9/3 for next appointment.    Caren Wheeler RN

## 2019-08-28 ENCOUNTER — OFFICE VISIT (OUTPATIENT)
Dept: NEUROLOGY | Facility: CLINIC | Age: 64
End: 2019-08-28
Payer: COMMERCIAL

## 2019-08-28 VITALS
HEIGHT: 62 IN | OXYGEN SATURATION: 97 % | BODY MASS INDEX: 30.73 KG/M2 | TEMPERATURE: 97.6 F | DIASTOLIC BLOOD PRESSURE: 70 MMHG | HEART RATE: 87 BPM | WEIGHT: 167 LBS | SYSTOLIC BLOOD PRESSURE: 110 MMHG

## 2019-08-28 DIAGNOSIS — M54.6 ACUTE LEFT-SIDED THORACIC BACK PAIN: Primary | ICD-10-CM

## 2019-08-28 PROCEDURE — 99205 OFFICE O/P NEW HI 60 MIN: CPT | Performed by: PSYCHIATRY & NEUROLOGY

## 2019-08-28 ASSESSMENT — MIFFLIN-ST. JEOR: SCORE: 1265.76

## 2019-08-28 NOTE — PROGRESS NOTES
INITIAL NEUROLOGY CONSULTATION    DATE OF VISIT: 8/28/2019  CLINIC LOCATION: [facility] [place]  MRN: [id number]  PATIENT NAME: Hoda Brush  YOB: 1955    PRIMARY CARE PROVIDER: Physician No Ref-Primary     REASON FOR VISIT:   Chief Complaint   Patient presents with     Rib Problem     Pain between the ribs and started with severe pain. Started 8/25/19     HISTORY OF PRESENT ILLNESS:                                                    Ms. Hoda Brush is 64 year old right handed female patient with history of metastatic left breast cancer, status post mastectomy in 2014, who was seen today for left thoracic pain (self-referred).  The patient is accompanied by her daughter, who participates in interview.  The professional [employer]  was also present during the entire interview.    Per patient's report, she was in her usual state of health until last Sunday (08/25/2019), when she acutely developed pain in the left paraspinal region at the thoracic level.  Initially it was up to 9/10, but over the last couple days reduced to 3-4/10.  Pain is worse while she lies down or takes deep breaths.  Denies any other aggravating or alleviating factors.  She denies any other associated symptoms.  Ibuprofen seemed to help at 600 mg.  No other treatments tried.  She had one previous similar episode approximately 6 months ago that resolved spontaneously in one day.    Most recent labs from August 2019 include elevated chloride, low albumin, low alkaline phosphatase, normal CBC, CA 27-29 of 12, and CEA of 0.6.    Brain MRI with and without contrast from 08/21/2017 demonstrated no evidence of metastatic disease.  Mild leukoaraiosis was seen.  CT of chest/abdomen/pelvis from 07/22/2019 demonstrated postsurgical changes of the left mastectomy and left axillary lymph node dissection without evidence of local recurrence.  Stable diffuse osseous metastases and stable small pulmonary nodules  were also noted.    No additional useful information is available in Care Everywhere, which is reviewed.    Review of Systems - the patient endorses insomnia (previously discussed with other medical providers). Otherwise, she denies any other complaints on 14-point comprehensive review of systems.  PAST MEDICAL/SURGICAL HISTORY:                                                    I personally reviewed patient's past medical and surgical history with the patient at today's visit.  Patient Active Problem List   Diagnosis     Malignant neoplasm of left female breast, unspecified estrogen receptor status, unspecified site of breast (H)     Bone metastasis (H)     Cellulitis of left upper extremity     Metastatic breast cancer (H)     Past Medical History:   Diagnosis Date     Breast cancer metastasized to bone (H)      Past Surgical History:   Procedure Laterality Date     APPENDECTOMY       COLONOSCOPY N/A 2/1/2018    Procedure: COLONOSCOPY;  Colonoscopy;  Surgeon: Phillip Rowe MD;  Location:  GI     ESOPHAGOSCOPY, GASTROSCOPY, DUODENOSCOPY (EGD), COMBINED N/A 2/16/2018    Procedure: COMBINED ESOPHAGOSCOPY, GASTROSCOPY, DUODENOSCOPY (EGD), BIOPSY SINGLE OR MULTIPLE;;  Surgeon: Paty Herman MD;  Location:  GI     INSERT PORT VASCULAR ACCESS Right 9/15/2017    Procedure: INSERT PORT VASCULAR ACCESS;  Central venous chest port placement, right;  Surgeon: Dmitriy Hernandez PA-C;  Location: [facility]     MASTECTOMY Left 06/27/2014    and lymph node resection     MASTECTOMY SIMPLE BILATERAL      Mastectomy 06/27/2014     MEDICATIONS:                                                    I personally reviewed patient's medications and allergies with the patient at today's visit.  Current Outpatient Medications on File Prior to Visit:  melatonin 3 MG tablet Take 1 tablet (3 mg) by mouth nightly as needed for sleep   order for DME Equipment being ordered: 2 Mastectomy bras and 1 prosthetheses.   order for  "DME L UE class 2 compression sleeve and gauntletNight garmentBandaging supplies   tamoxifen (NOLVADEX) 20 MG tablet Take 1 tablet (20 mg) by mouth daily   VITAMIN D, CHOLECALCIFEROL, PO Take 1,000 Units by mouth daily     ALLERGIES:                                                    No Known Allergies  FAMILY/SOCIAL HISTORY:                                                    Family and social history was reviewed with the patient at today's visit.  No family history of neurological disorders.  Never smoker.  Denies current alcohol and recreational drug use.  , lives with his family.  Retired professor.  REVIEW OF SYSTEMS:                                                    Patient has completed a [facility] Patient Health History, including a 14-system review, which was personally reviewed, and pertinent positives are listed in HPI. She denies any additional problems on the further questioning.  EXAM:                                                    VITAL SIGNS:   /70 (BP Location: Right arm, Patient Position: Sitting, Cuff Size: Adult Regular)   Pulse 87   Temp 97.6  F (36.4  C) (Oral)   Ht 1.575 m (5' 2\")   Wt 75.8 kg (167 lb)   SpO2 97%   BMI 30.54 kg/m    Mini-Cog Assessment:  Mini Cog Assessment  Clock Draw Score: 2 Normal  3 Item Recall: 2 objects recalled  Mini Cog Total Score: 4    General: pt is in NAD, cooperative.  Skin: normal turgor, moist mucous membranes, no lesions/rashes noticed.  HEENT: ATNC, EOMI, PERRL, white sclera, normal conjunctiva, no nystagmus or ptosis. No carotid bruits bilaterally.  Respiratory: lung sounds clear to auscultation bilaterally, no crackles, wheezes, rhonchi. Symmetric lung excursion, no accessory respiratory muscle use.  Cardiovascular: normal S1/S2, no murmurs/rubs/gallops.   Abdomen: Not distended.  : deferred.    Neurological:  Mental: alert, follows commands, Mini Cog Total Score: 4/5 with 2/3 on memory recall, no aphasia or dysarthria. " Fund of knowledge is appropriate for age.  Cranial Nerves:  CN II: visual acuity - able to accurately count fingers with each eye. Visual fields intact, fundi: discs sharp, no papilledema and normal vessels bilaterally.  CN III, IV, VI: EOM intact, pupils equal and reactive  CN V: facial sensation nl  CN VII: face symmetric, no facial droop  CN VIII: hearing normal  CN IX: palate elevation symmetric, uvula at midline  CN XI SCM normal, shoulder shrug nl  CN XII: tongue midline  Motor: Strength: 5/5 in all major groups of all extremities. Normal tone. No abnormal movements. No pronator drift b/l.  Reflexes: Triceps, biceps, brachioradialis, patellar, and achilles reflexes normal and symmetric. No clonus noted. Toes are down-going b/l.   Sensory: temperature, light touch, pinprick, and vibration intact. Romberg: negative.  Coordination: FNF and heel-shin tests intact b/l.   Gait:  Normal, able to tandem, toe, and heel walk.  No tenderness to palpation over the thoracic paraspinal regions on both sides.  DATA:   LABS/IMAGING/OTHER STUDIES: I reviewed pertinent medical records, including personal review of brain MRI images and Care Everywhere, as detailed in the history of present illness.  ASSESSMENT AND PLAN:      ASSESSMENT: Hoda Brush is a 64 year old female patient with history of metastatic left breast cancer, status post mastectomy in 2014, who presents with left severe thoracic pain since 08/25/2019 that improved.    We had a prolonged discussion with the patient and her daughter regarding her presenting complaints.  Her neurological exam today is non-focal.  The clinical presentation might be due to musculoskeletal pain, intercostal neuralgia, bulging disc in thoracic spine, or metastasis.  For further diagnostic education I ordered thoracic spine MRI with and without contrast.  Further recommendations will be based on results.    DIAGNOSES:    ICD-10-CM    1. Acute left-sided thoracic back pain  M54.6 MR Thoracic Spine w/o & w Contrast     PLAN: At today's visit we thoroughly discussed various diagnostic possibilities for patient's symptoms, necessary evaluation, and the plan, which includes:  Orders Placed This Encounter   Procedures     MR Thoracic Spine w/o & w Contrast     No new medications.     Additional recommendations after the work-up.    Next follow-up appointment is in the next 2 weeks or earlier if needed.    Total Time:  63 minutes with > 50% spent counseling the patient and her daughter on stated above assessment and recommendations, including nature of the diagnosis, needed w/u, and proposed plan.  Additional time was used to answer questions regarding patient's symptoms, my recommendations, and the plan.    Abdias Ascencio MD  / Neurology  [place]  (Chart documentation was completed in part with Dragon voice-recognition software. Even though reviewed, some grammatical, spelling, and word errors may remain.)

## 2019-08-28 NOTE — PATIENT INSTRUCTIONS
AFTER VISIT SUMMARY (AVS):    At today's visit we thoroughly discussed various diagnostic possibilities for your symptoms, necessary evaluation, and the plan, which includes:  Orders Placed This Encounter   Procedures     MR Thoracic Spine w/o & w Contrast     No new medications.     Additional recommendations after the work-up.    Next follow-up appointment is in the next 2 weeks or earlier if needed.    Please do not hesitate to call me with any questions or concerns.    Thanks.

## 2019-08-30 ENCOUNTER — HOSPITAL ENCOUNTER (OUTPATIENT)
Dept: MRI IMAGING | Facility: CLINIC | Age: 64
Discharge: HOME | End: 2019-08-30
Attending: PSYCHIATRY & NEUROLOGY | Admitting: PSYCHIATRY & NEUROLOGY
Payer: COMMERCIAL

## 2019-08-30 ENCOUNTER — HOSPITAL ENCOUNTER (OUTPATIENT)
Facility: CLINIC | Age: 64
Setting detail: OBSERVATION
Discharge: HOME | End: 2019-09-01
Attending: EMERGENCY MEDICINE | Admitting: HOSPITALIST
Payer: COMMERCIAL

## 2019-08-30 DIAGNOSIS — K81.0 ACUTE CHOLECYSTITIS: ICD-10-CM

## 2019-08-30 DIAGNOSIS — M54.6 ACUTE LEFT-SIDED THORACIC BACK PAIN: ICD-10-CM

## 2019-08-30 DIAGNOSIS — R07.89 ATYPICAL CHEST PAIN: ICD-10-CM

## 2019-08-30 DIAGNOSIS — R10.13 ABDOMINAL PAIN, EPIGASTRIC: ICD-10-CM

## 2019-08-30 LAB
BASOPHILS # BLD AUTO: 0 10E9/L (ref 0–0.2)
BASOPHILS NFR BLD AUTO: 0.1 %
DIFFERENTIAL METHOD BLD: ABNORMAL
EOSINOPHIL # BLD AUTO: 0.5 10E9/L (ref 0–0.7)
EOSINOPHIL NFR BLD AUTO: 3.5 %
ERYTHROCYTE [DISTWIDTH] IN BLOOD BY AUTOMATED COUNT: 13.8 % (ref 10–15)
HCT VFR BLD AUTO: 34.9 % (ref 35–47)
HGB BLD-MCNC: 11.2 G/DL (ref 11.7–15.7)
IMM GRANULOCYTES # BLD: 0 10E9/L (ref 0–0.4)
IMM GRANULOCYTES NFR BLD: 0.1 %
LYMPHOCYTES # BLD AUTO: 3.3 10E9/L (ref 0.8–5.3)
LYMPHOCYTES NFR BLD AUTO: 23.9 %
MCH RBC QN AUTO: 29.7 PG (ref 26.5–33)
MCHC RBC AUTO-ENTMCNC: 32.1 G/DL (ref 31.5–36.5)
MCV RBC AUTO: 93 FL (ref 78–100)
MONOCYTES # BLD AUTO: 0.9 10E9/L (ref 0–1.3)
MONOCYTES NFR BLD AUTO: 6.6 %
NEUTROPHILS # BLD AUTO: 9.2 10E9/L (ref 1.6–8.3)
NEUTROPHILS NFR BLD AUTO: 65.8 %
NRBC # BLD AUTO: 0 10*3/UL
NRBC BLD AUTO-RTO: 0 /100
PLATELET # BLD AUTO: 260 10E9/L (ref 150–450)
RBC # BLD AUTO: 3.77 10E12/L (ref 3.8–5.2)
WBC # BLD AUTO: 13.9 10E9/L (ref 4–11)

## 2019-08-30 PROCEDURE — 25500064 ZZH RX 255 OP 636: Performed by: PSYCHIATRY & NEUROLOGY

## 2019-08-30 PROCEDURE — 83690 ASSAY OF LIPASE: CPT | Performed by: EMERGENCY MEDICINE

## 2019-08-30 PROCEDURE — 99285 EMERGENCY DEPT VISIT HI MDM: CPT | Mod: 25

## 2019-08-30 PROCEDURE — 72157 MRI CHEST SPINE W/O & W/DYE: CPT

## 2019-08-30 PROCEDURE — 25000128 H RX IP 250 OP 636: Performed by: EMERGENCY MEDICINE

## 2019-08-30 PROCEDURE — 96374 THER/PROPH/DIAG INJ IV PUSH: CPT

## 2019-08-30 PROCEDURE — 85379 FIBRIN DEGRADATION QUANT: CPT | Performed by: EMERGENCY MEDICINE

## 2019-08-30 PROCEDURE — 80048 BASIC METABOLIC PNL TOTAL CA: CPT | Performed by: EMERGENCY MEDICINE

## 2019-08-30 PROCEDURE — 84484 ASSAY OF TROPONIN QUANT: CPT | Performed by: EMERGENCY MEDICINE

## 2019-08-30 PROCEDURE — 85025 COMPLETE CBC W/AUTO DIFF WBC: CPT | Performed by: EMERGENCY MEDICINE

## 2019-08-30 PROCEDURE — A9585 GADOBUTROL INJECTION: HCPCS | Performed by: PSYCHIATRY & NEUROLOGY

## 2019-08-30 PROCEDURE — 93005 ELECTROCARDIOGRAM TRACING: CPT

## 2019-08-30 PROCEDURE — 80076 HEPATIC FUNCTION PANEL: CPT | Performed by: EMERGENCY MEDICINE

## 2019-08-30 RX ORDER — ONDANSETRON 2 MG/ML
4 INJECTION INTRAMUSCULAR; INTRAVENOUS ONCE
Status: COMPLETED | OUTPATIENT
Start: 2019-08-30 | End: 2019-08-30

## 2019-08-30 RX ORDER — GADOBUTROL 604.72 MG/ML
7 INJECTION INTRAVENOUS ONCE
Status: COMPLETED | OUTPATIENT
Start: 2019-08-30 | End: 2019-08-30

## 2019-08-30 RX ADMIN — ONDANSETRON 4 MG: 2 INJECTION INTRAMUSCULAR; INTRAVENOUS at 23:46

## 2019-08-30 RX ADMIN — GADOBUTROL 7 ML: 604.72 INJECTION INTRAVENOUS at 09:01

## 2019-08-30 ASSESSMENT — MIFFLIN-ST. JEOR: SCORE: 1234.01

## 2019-08-31 ENCOUNTER — APPOINTMENT (OUTPATIENT)
Dept: CT IMAGING | Facility: CLINIC | Age: 64
End: 2019-08-31
Attending: EMERGENCY MEDICINE
Payer: COMMERCIAL

## 2019-08-31 ENCOUNTER — ANESTHESIA (OUTPATIENT)
Dept: SURGERY | Facility: CLINIC | Age: 64
End: 2019-08-31
Payer: COMMERCIAL

## 2019-08-31 ENCOUNTER — ANESTHESIA EVENT (OUTPATIENT)
Dept: SURGERY | Facility: CLINIC | Age: 64
End: 2019-08-31
Payer: COMMERCIAL

## 2019-08-31 ENCOUNTER — APPOINTMENT (OUTPATIENT)
Dept: ULTRASOUND IMAGING | Facility: CLINIC | Age: 64
End: 2019-08-31
Attending: EMERGENCY MEDICINE
Payer: COMMERCIAL

## 2019-08-31 PROBLEM — K81.0 ACUTE CHOLECYSTITIS: Status: ACTIVE | Noted: 2019-08-31

## 2019-08-31 LAB
ALBUMIN SERPL-MCNC: 2.7 G/DL (ref 3.4–5)
ALBUMIN SERPL-MCNC: 3 G/DL (ref 3.4–5)
ALP SERPL-CCNC: 104 U/L (ref 40–150)
ALP SERPL-CCNC: 61 U/L (ref 40–150)
ALT SERPL W P-5'-P-CCNC: 103 U/L (ref 0–50)
ALT SERPL W P-5'-P-CCNC: 23 U/L (ref 0–50)
ANION GAP SERPL CALCULATED.3IONS-SCNC: 11 MMOL/L (ref 3–14)
ANION GAP SERPL CALCULATED.3IONS-SCNC: 6 MMOL/L (ref 3–14)
AST SERPL W P-5'-P-CCNC: 159 U/L (ref 0–45)
AST SERPL W P-5'-P-CCNC: 27 U/L (ref 0–45)
BASOPHILS # BLD AUTO: 0 10E9/L (ref 0–0.2)
BASOPHILS NFR BLD AUTO: 0.2 %
BILIRUB DIRECT SERPL-MCNC: 0.2 MG/DL (ref 0–0.2)
BILIRUB SERPL-MCNC: 0.4 MG/DL (ref 0.2–1.3)
BILIRUB SERPL-MCNC: 1 MG/DL (ref 0.2–1.3)
BUN SERPL-MCNC: 12 MG/DL (ref 7–30)
BUN SERPL-MCNC: 19 MG/DL (ref 7–30)
CALCIUM SERPL-MCNC: 8.3 MG/DL (ref 8.5–10.1)
CALCIUM SERPL-MCNC: 8.4 MG/DL (ref 8.5–10.1)
CHLORIDE SERPL-SCNC: 107 MMOL/L (ref 94–109)
CHLORIDE SERPL-SCNC: 110 MMOL/L (ref 94–109)
CO2 SERPL-SCNC: 24 MMOL/L (ref 20–32)
CO2 SERPL-SCNC: 27 MMOL/L (ref 20–32)
CREAT SERPL-MCNC: 0.69 MG/DL (ref 0.52–1.04)
CREAT SERPL-MCNC: 0.73 MG/DL (ref 0.52–1.04)
D DIMER PPP FEU-MCNC: 2.4 UG/ML FEU (ref 0–0.5)
DIFFERENTIAL METHOD BLD: ABNORMAL
EOSINOPHIL # BLD AUTO: 0.2 10E9/L (ref 0–0.7)
EOSINOPHIL NFR BLD AUTO: 2 %
ERYTHROCYTE [DISTWIDTH] IN BLOOD BY AUTOMATED COUNT: 13.6 % (ref 10–15)
GFR SERPL CREATININE-BSD FRML MDRD: 88 ML/MIN/{1.73_M2}
GFR SERPL CREATININE-BSD FRML MDRD: >90 ML/MIN/{1.73_M2}
GLUCOSE SERPL-MCNC: 104 MG/DL (ref 70–99)
GLUCOSE SERPL-MCNC: 130 MG/DL (ref 70–99)
HCT VFR BLD AUTO: 32.9 % (ref 35–47)
HGB BLD-MCNC: 10.6 G/DL (ref 11.7–15.7)
IMM GRANULOCYTES # BLD: 0 10E9/L (ref 0–0.4)
IMM GRANULOCYTES NFR BLD: 0.2 %
INTERPRETATION ECG - MUSE: NORMAL
LIPASE SERPL-CCNC: 128 U/L (ref 73–393)
LYMPHOCYTES # BLD AUTO: 1.9 10E9/L (ref 0.8–5.3)
LYMPHOCYTES NFR BLD AUTO: 18.2 %
MCH RBC QN AUTO: 29.8 PG (ref 26.5–33)
MCHC RBC AUTO-ENTMCNC: 32.2 G/DL (ref 31.5–36.5)
MCV RBC AUTO: 92 FL (ref 78–100)
MONOCYTES # BLD AUTO: 0.7 10E9/L (ref 0–1.3)
MONOCYTES NFR BLD AUTO: 7 %
NEUTROPHILS # BLD AUTO: 7.7 10E9/L (ref 1.6–8.3)
NEUTROPHILS NFR BLD AUTO: 72.4 %
NRBC # BLD AUTO: 0 10*3/UL
NRBC BLD AUTO-RTO: 0 /100
PLATELET # BLD AUTO: 244 10E9/L (ref 150–450)
POTASSIUM SERPL-SCNC: 3.2 MMOL/L (ref 3.4–5.3)
POTASSIUM SERPL-SCNC: 4 MMOL/L (ref 3.4–5.3)
PROT SERPL-MCNC: 6.9 G/DL (ref 6.8–8.8)
PROT SERPL-MCNC: 7.3 G/DL (ref 6.8–8.8)
RBC # BLD AUTO: 3.56 10E12/L (ref 3.8–5.2)
SODIUM SERPL-SCNC: 142 MMOL/L (ref 133–144)
SODIUM SERPL-SCNC: 143 MMOL/L (ref 133–144)
TROPONIN I SERPL-MCNC: <0.015 UG/L (ref 0–0.04)
TROPONIN I SERPL-MCNC: <0.015 UG/L (ref 0–0.04)
WBC # BLD AUTO: 10.6 10E9/L (ref 4–11)

## 2019-08-31 PROCEDURE — 99204 OFFICE O/P NEW MOD 45 MIN: CPT | Mod: 57 | Performed by: SURGERY

## 2019-08-31 PROCEDURE — 25000128 H RX IP 250 OP 636: Performed by: NURSE ANESTHETIST, CERTIFIED REGISTERED

## 2019-08-31 PROCEDURE — 88304 TISSUE EXAM BY PATHOLOGIST: CPT | Mod: 26 | Performed by: SURGERY

## 2019-08-31 PROCEDURE — 80053 COMPREHEN METABOLIC PANEL: CPT | Performed by: HOSPITALIST

## 2019-08-31 PROCEDURE — 71000012 ZZH RECOVERY PHASE 1 LEVEL 1 FIRST HR: Performed by: SURGERY

## 2019-08-31 PROCEDURE — 76705 ECHO EXAM OF ABDOMEN: CPT

## 2019-08-31 PROCEDURE — 74177 CT ABD & PELVIS W/CONTRAST: CPT

## 2019-08-31 PROCEDURE — 25000132 ZZH RX MED GY IP 250 OP 250 PS 637: Performed by: EMERGENCY MEDICINE

## 2019-08-31 PROCEDURE — 25000128 H RX IP 250 OP 636: Performed by: EMERGENCY MEDICINE

## 2019-08-31 PROCEDURE — 96375 TX/PRO/DX INJ NEW DRUG ADDON: CPT | Mod: 59

## 2019-08-31 PROCEDURE — 27210794 ZZH OR GENERAL SUPPLY STERILE: Performed by: SURGERY

## 2019-08-31 PROCEDURE — 47562 LAPAROSCOPIC CHOLECYSTECTOMY: CPT | Performed by: SURGERY

## 2019-08-31 PROCEDURE — 25800025 ZZH RX 258: Performed by: SURGERY

## 2019-08-31 PROCEDURE — 25000128 H RX IP 250 OP 636: Performed by: HOSPITALIST

## 2019-08-31 PROCEDURE — G0378 HOSPITAL OBSERVATION PER HR: HCPCS

## 2019-08-31 PROCEDURE — 40000170 ZZH STATISTIC PRE-PROCEDURE ASSESSMENT II: Performed by: SURGERY

## 2019-08-31 PROCEDURE — 36000058 ZZH SURGERY LEVEL 3 EA 15 ADDTL MIN: Performed by: SURGERY

## 2019-08-31 PROCEDURE — 37000009 ZZH ANESTHESIA TECHNICAL FEE, EACH ADDTL 15 MIN: Performed by: SURGERY

## 2019-08-31 PROCEDURE — 99220 ZZC INITIAL OBSERVATION CARE,LEVL III: CPT | Performed by: HOSPITALIST

## 2019-08-31 PROCEDURE — 71260 CT THORAX DX C+: CPT

## 2019-08-31 PROCEDURE — 25000128 H RX IP 250 OP 636: Performed by: ANESTHESIOLOGY

## 2019-08-31 PROCEDURE — 36000056 ZZH SURGERY LEVEL 3 1ST 30 MIN: Performed by: SURGERY

## 2019-08-31 PROCEDURE — 84484 ASSAY OF TROPONIN QUANT: CPT | Performed by: EMERGENCY MEDICINE

## 2019-08-31 PROCEDURE — 25000125 ZZHC RX 250: Performed by: SURGERY

## 2019-08-31 PROCEDURE — 25000125 ZZHC RX 250: Performed by: EMERGENCY MEDICINE

## 2019-08-31 PROCEDURE — 85025 COMPLETE CBC W/AUTO DIFF WBC: CPT | Performed by: HOSPITALIST

## 2019-08-31 PROCEDURE — 25000132 ZZH RX MED GY IP 250 OP 250 PS 637: Performed by: STUDENT IN AN ORGANIZED HEALTH CARE EDUCATION/TRAINING PROGRAM

## 2019-08-31 PROCEDURE — 25000128 H RX IP 250 OP 636: Mod: JW | Performed by: STUDENT IN AN ORGANIZED HEALTH CARE EDUCATION/TRAINING PROGRAM

## 2019-08-31 PROCEDURE — 25800030 ZZH RX IP 258 OP 636: Performed by: NURSE ANESTHETIST, CERTIFIED REGISTERED

## 2019-08-31 PROCEDURE — 88304 TISSUE EXAM BY PATHOLOGIST: CPT | Performed by: SURGERY

## 2019-08-31 PROCEDURE — 25000566 ZZH SEVOFLURANE, EA 15 MIN: Performed by: SURGERY

## 2019-08-31 PROCEDURE — C9113 INJ PANTOPRAZOLE SODIUM, VIA: HCPCS | Performed by: HOSPITALIST

## 2019-08-31 PROCEDURE — 96375 TX/PRO/DX INJ NEW DRUG ADDON: CPT

## 2019-08-31 PROCEDURE — 25000125 ZZHC RX 250: Performed by: NURSE ANESTHETIST, CERTIFIED REGISTERED

## 2019-08-31 PROCEDURE — 37000008 ZZH ANESTHESIA TECHNICAL FEE, 1ST 30 MIN: Performed by: SURGERY

## 2019-08-31 RX ORDER — AMOXICILLIN 250 MG
2 CAPSULE ORAL 2 TIMES DAILY PRN
Status: DISCONTINUED | OUTPATIENT
Start: 2019-08-31 | End: 2019-09-01 | Stop reason: HOSPADM

## 2019-08-31 RX ORDER — PROPOFOL 10 MG/ML
INJECTION, EMULSION INTRAVENOUS PRN
Status: DISCONTINUED | OUTPATIENT
Start: 2019-08-31 | End: 2019-08-31

## 2019-08-31 RX ORDER — CEFTRIAXONE 1 G/1
1 INJECTION, POWDER, FOR SOLUTION INTRAMUSCULAR; INTRAVENOUS ONCE
Status: DISCONTINUED | OUTPATIENT
Start: 2019-08-31 | End: 2019-09-01 | Stop reason: HOSPADM

## 2019-08-31 RX ORDER — DEXAMETHASONE SODIUM PHOSPHATE 4 MG/ML
INJECTION, SOLUTION INTRA-ARTICULAR; INTRALESIONAL; INTRAMUSCULAR; INTRAVENOUS; SOFT TISSUE PRN
Status: DISCONTINUED | OUTPATIENT
Start: 2019-08-31 | End: 2019-08-31

## 2019-08-31 RX ORDER — SODIUM CHLORIDE 9 MG/ML
INJECTION, SOLUTION INTRAVENOUS CONTINUOUS PRN
Status: DISCONTINUED | OUTPATIENT
Start: 2019-08-31 | End: 2019-08-31

## 2019-08-31 RX ORDER — FENTANYL CITRATE 50 UG/ML
25-50 INJECTION, SOLUTION INTRAMUSCULAR; INTRAVENOUS
Status: DISCONTINUED | OUTPATIENT
Start: 2019-08-31 | End: 2019-08-31 | Stop reason: HOSPADM

## 2019-08-31 RX ORDER — SODIUM CHLORIDE, SODIUM LACTATE, POTASSIUM CHLORIDE, CALCIUM CHLORIDE 600; 310; 30; 20 MG/100ML; MG/100ML; MG/100ML; MG/100ML
INJECTION, SOLUTION INTRAVENOUS CONTINUOUS
Status: DISCONTINUED | OUTPATIENT
Start: 2019-08-31 | End: 2019-08-31 | Stop reason: HOSPADM

## 2019-08-31 RX ORDER — POTASSIUM CHLORIDE 29.8 MG/ML
20 INJECTION INTRAVENOUS
Status: DISCONTINUED | OUTPATIENT
Start: 2019-08-31 | End: 2019-09-01 | Stop reason: HOSPADM

## 2019-08-31 RX ORDER — IOPAMIDOL 755 MG/ML
81 INJECTION, SOLUTION INTRAVASCULAR ONCE
Status: COMPLETED | OUTPATIENT
Start: 2019-08-31 | End: 2019-08-31

## 2019-08-31 RX ORDER — BISACODYL 10 MG
10 SUPPOSITORY, RECTAL RECTAL DAILY PRN
Status: DISCONTINUED | OUTPATIENT
Start: 2019-08-31 | End: 2019-09-01 | Stop reason: HOSPADM

## 2019-08-31 RX ORDER — ONDANSETRON 4 MG/1
4 TABLET, ORALLY DISINTEGRATING ORAL EVERY 30 MIN PRN
Status: DISCONTINUED | OUTPATIENT
Start: 2019-08-31 | End: 2019-08-31 | Stop reason: HOSPADM

## 2019-08-31 RX ORDER — LIDOCAINE HYDROCHLORIDE 20 MG/ML
INJECTION, SOLUTION INFILTRATION; PERINEURAL PRN
Status: DISCONTINUED | OUTPATIENT
Start: 2019-08-31 | End: 2019-08-31

## 2019-08-31 RX ORDER — SODIUM CHLORIDE, SODIUM LACTATE, POTASSIUM CHLORIDE, CALCIUM CHLORIDE 600; 310; 30; 20 MG/100ML; MG/100ML; MG/100ML; MG/100ML
INJECTION, SOLUTION INTRAVENOUS CONTINUOUS PRN
Status: DISCONTINUED | OUTPATIENT
Start: 2019-08-31 | End: 2019-08-31

## 2019-08-31 RX ORDER — MAGNESIUM HYDROXIDE 1200 MG/15ML
LIQUID ORAL PRN
Status: DISCONTINUED | OUTPATIENT
Start: 2019-08-31 | End: 2019-08-31 | Stop reason: HOSPADM

## 2019-08-31 RX ORDER — CEFTRIAXONE 2 G/1
2 INJECTION, POWDER, FOR SOLUTION INTRAMUSCULAR; INTRAVENOUS EVERY 24 HOURS
Status: DISCONTINUED | OUTPATIENT
Start: 2019-08-31 | End: 2019-09-01 | Stop reason: HOSPADM

## 2019-08-31 RX ORDER — PROCHLORPERAZINE 25 MG
25 SUPPOSITORY, RECTAL RECTAL EVERY 12 HOURS PRN
Status: DISCONTINUED | OUTPATIENT
Start: 2019-08-31 | End: 2019-09-01 | Stop reason: HOSPADM

## 2019-08-31 RX ORDER — ONDANSETRON 2 MG/ML
INJECTION INTRAMUSCULAR; INTRAVENOUS PRN
Status: DISCONTINUED | OUTPATIENT
Start: 2019-08-31 | End: 2019-08-31

## 2019-08-31 RX ORDER — EPHEDRINE SULFATE 50 MG/ML
INJECTION, SOLUTION INTRAMUSCULAR; INTRAVENOUS; SUBCUTANEOUS PRN
Status: DISCONTINUED | OUTPATIENT
Start: 2019-08-31 | End: 2019-08-31

## 2019-08-31 RX ORDER — CEFTRIAXONE 1 G/1
1 INJECTION, POWDER, FOR SOLUTION INTRAMUSCULAR; INTRAVENOUS ONCE
Status: COMPLETED | OUTPATIENT
Start: 2019-08-31 | End: 2019-08-31

## 2019-08-31 RX ORDER — ACETAMINOPHEN 650 MG/1
650 SUPPOSITORY RECTAL EVERY 4 HOURS PRN
Status: DISCONTINUED | OUTPATIENT
Start: 2019-08-31 | End: 2019-09-01 | Stop reason: HOSPADM

## 2019-08-31 RX ORDER — ONDANSETRON 4 MG/1
4 TABLET, ORALLY DISINTEGRATING ORAL EVERY 6 HOURS PRN
Status: DISCONTINUED | OUTPATIENT
Start: 2019-08-31 | End: 2019-09-01 | Stop reason: HOSPADM

## 2019-08-31 RX ORDER — ONDANSETRON 2 MG/ML
4 INJECTION INTRAMUSCULAR; INTRAVENOUS EVERY 30 MIN PRN
Status: DISCONTINUED | OUTPATIENT
Start: 2019-08-31 | End: 2019-08-31 | Stop reason: HOSPADM

## 2019-08-31 RX ORDER — HYDROMORPHONE HYDROCHLORIDE 1 MG/ML
.3-.5 INJECTION, SOLUTION INTRAMUSCULAR; INTRAVENOUS; SUBCUTANEOUS EVERY 5 MIN PRN
Status: DISCONTINUED | OUTPATIENT
Start: 2019-08-31 | End: 2019-08-31 | Stop reason: HOSPADM

## 2019-08-31 RX ORDER — POTASSIUM CHLORIDE 1500 MG/1
20 TABLET, EXTENDED RELEASE ORAL ONCE
Status: COMPLETED | OUTPATIENT
Start: 2019-08-31 | End: 2019-08-31

## 2019-08-31 RX ORDER — LANOLIN ALCOHOL/MO/W.PET/CERES
3 CREAM (GRAM) TOPICAL
Status: DISCONTINUED | OUTPATIENT
Start: 2019-08-31 | End: 2019-09-01 | Stop reason: HOSPADM

## 2019-08-31 RX ORDER — HYDROMORPHONE HYDROCHLORIDE 1 MG/ML
.2-.3 INJECTION, SOLUTION INTRAMUSCULAR; INTRAVENOUS; SUBCUTANEOUS
Status: DISCONTINUED | OUTPATIENT
Start: 2019-08-31 | End: 2019-09-01 | Stop reason: HOSPADM

## 2019-08-31 RX ORDER — ONDANSETRON 2 MG/ML
4 INJECTION INTRAMUSCULAR; INTRAVENOUS EVERY 6 HOURS PRN
Status: DISCONTINUED | OUTPATIENT
Start: 2019-08-31 | End: 2019-09-01 | Stop reason: HOSPADM

## 2019-08-31 RX ORDER — HEPARIN SODIUM,PORCINE 10 UNIT/ML
5-10 VIAL (ML) INTRAVENOUS EVERY 24 HOURS
Status: DISCONTINUED | OUTPATIENT
Start: 2019-08-31 | End: 2019-09-01 | Stop reason: HOSPADM

## 2019-08-31 RX ORDER — FENTANYL CITRATE 50 UG/ML
INJECTION, SOLUTION INTRAMUSCULAR; INTRAVENOUS PRN
Status: DISCONTINUED | OUTPATIENT
Start: 2019-08-31 | End: 2019-08-31

## 2019-08-31 RX ORDER — POLYETHYLENE GLYCOL 3350 17 G/17G
17 POWDER, FOR SOLUTION ORAL DAILY PRN
Status: DISCONTINUED | OUTPATIENT
Start: 2019-08-31 | End: 2019-09-01 | Stop reason: HOSPADM

## 2019-08-31 RX ORDER — PROCHLORPERAZINE MALEATE 10 MG
10 TABLET ORAL EVERY 6 HOURS PRN
Status: DISCONTINUED | OUTPATIENT
Start: 2019-08-31 | End: 2019-09-01 | Stop reason: HOSPADM

## 2019-08-31 RX ORDER — LIDOCAINE 40 MG/G
CREAM TOPICAL
Status: DISCONTINUED | OUTPATIENT
Start: 2019-08-31 | End: 2019-09-01 | Stop reason: HOSPADM

## 2019-08-31 RX ORDER — OXYCODONE HYDROCHLORIDE 5 MG/1
5-10 TABLET ORAL
Status: DISCONTINUED | OUTPATIENT
Start: 2019-08-31 | End: 2019-09-01 | Stop reason: HOSPADM

## 2019-08-31 RX ORDER — POTASSIUM CHLORIDE 7.45 MG/ML
10 INJECTION INTRAVENOUS
Status: DISCONTINUED | OUTPATIENT
Start: 2019-08-31 | End: 2019-09-01 | Stop reason: HOSPADM

## 2019-08-31 RX ORDER — POTASSIUM CL/LIDO/0.9 % NACL 10MEQ/0.1L
10 INTRAVENOUS SOLUTION, PIGGYBACK (ML) INTRAVENOUS
Status: DISCONTINUED | OUTPATIENT
Start: 2019-08-31 | End: 2019-09-01 | Stop reason: HOSPADM

## 2019-08-31 RX ORDER — IBUPROFEN 600 MG/1
600 TABLET, FILM COATED ORAL EVERY 6 HOURS PRN
COMMUNITY
End: 2021-02-16

## 2019-08-31 RX ORDER — BUPIVACAINE HYDROCHLORIDE AND EPINEPHRINE 2.5; 5 MG/ML; UG/ML
INJECTION, SOLUTION INFILTRATION; PERINEURAL PRN
Status: DISCONTINUED | OUTPATIENT
Start: 2019-08-31 | End: 2019-08-31 | Stop reason: HOSPADM

## 2019-08-31 RX ORDER — HEPARIN SODIUM (PORCINE) LOCK FLUSH IV SOLN 100 UNIT/ML 100 UNIT/ML
5 SOLUTION INTRAVENOUS
Status: DISCONTINUED | OUTPATIENT
Start: 2019-08-31 | End: 2019-09-01 | Stop reason: HOSPADM

## 2019-08-31 RX ORDER — NALOXONE HYDROCHLORIDE 0.4 MG/ML
.1-.4 INJECTION, SOLUTION INTRAMUSCULAR; INTRAVENOUS; SUBCUTANEOUS
Status: DISCONTINUED | OUTPATIENT
Start: 2019-08-31 | End: 2019-08-31

## 2019-08-31 RX ORDER — ACETAMINOPHEN 325 MG/1
650 TABLET ORAL EVERY 4 HOURS PRN
Status: DISCONTINUED | OUTPATIENT
Start: 2019-08-31 | End: 2019-09-01 | Stop reason: HOSPADM

## 2019-08-31 RX ORDER — NALOXONE HYDROCHLORIDE 0.4 MG/ML
.1-.4 INJECTION, SOLUTION INTRAMUSCULAR; INTRAVENOUS; SUBCUTANEOUS
Status: DISCONTINUED | OUTPATIENT
Start: 2019-08-31 | End: 2019-09-01 | Stop reason: HOSPADM

## 2019-08-31 RX ORDER — POTASSIUM CHLORIDE 1.5 G/1.58G
20-40 POWDER, FOR SOLUTION ORAL
Status: DISCONTINUED | OUTPATIENT
Start: 2019-08-31 | End: 2019-09-01 | Stop reason: HOSPADM

## 2019-08-31 RX ORDER — AMOXICILLIN 250 MG
1 CAPSULE ORAL 2 TIMES DAILY PRN
Status: DISCONTINUED | OUTPATIENT
Start: 2019-08-31 | End: 2019-09-01 | Stop reason: HOSPADM

## 2019-08-31 RX ORDER — HEPARIN SODIUM,PORCINE 10 UNIT/ML
5-10 VIAL (ML) INTRAVENOUS
Status: DISCONTINUED | OUTPATIENT
Start: 2019-08-31 | End: 2019-09-01 | Stop reason: HOSPADM

## 2019-08-31 RX ORDER — ACETAMINOPHEN 500 MG
1000 TABLET ORAL EVERY 6 HOURS PRN
COMMUNITY

## 2019-08-31 RX ORDER — POTASSIUM CHLORIDE 1500 MG/1
20-40 TABLET, EXTENDED RELEASE ORAL
Status: DISCONTINUED | OUTPATIENT
Start: 2019-08-31 | End: 2019-09-01 | Stop reason: HOSPADM

## 2019-08-31 RX ADMIN — SODIUM CHLORIDE: 9 INJECTION, SOLUTION INTRAVENOUS at 10:01

## 2019-08-31 RX ADMIN — DEXMEDETOMIDINE HYDROCHLORIDE 8 MCG: 100 INJECTION, SOLUTION INTRAVENOUS at 10:35

## 2019-08-31 RX ADMIN — SUGAMMADEX 200 MG: 100 INJECTION, SOLUTION INTRAVENOUS at 11:12

## 2019-08-31 RX ADMIN — SUCCINYLCHOLINE CHLORIDE 160 MG: 20 INJECTION, SOLUTION INTRAMUSCULAR; INTRAVENOUS; PARENTERAL at 10:07

## 2019-08-31 RX ADMIN — PHENYLEPHRINE HYDROCHLORIDE 200 MCG: 10 INJECTION INTRAVENOUS at 10:22

## 2019-08-31 RX ADMIN — LIDOCAINE HYDROCHLORIDE 100 MG: 20 INJECTION, SOLUTION INFILTRATION; PERINEURAL at 10:07

## 2019-08-31 RX ADMIN — CEFTRIAXONE SODIUM 1 G: 1 INJECTION, POWDER, FOR SOLUTION INTRAMUSCULAR; INTRAVENOUS at 03:59

## 2019-08-31 RX ADMIN — PHENYLEPHRINE HYDROCHLORIDE 100 MCG: 10 INJECTION INTRAVENOUS at 10:10

## 2019-08-31 RX ADMIN — SODIUM CHLORIDE, POTASSIUM CHLORIDE, SODIUM LACTATE AND CALCIUM CHLORIDE: 600; 310; 30; 20 INJECTION, SOLUTION INTRAVENOUS at 09:31

## 2019-08-31 RX ADMIN — SODIUM CHLORIDE 65 ML: 9 INJECTION, SOLUTION INTRAVENOUS at 01:18

## 2019-08-31 RX ADMIN — ROCURONIUM BROMIDE 30 MG: 10 INJECTION INTRAVENOUS at 10:22

## 2019-08-31 RX ADMIN — MIDAZOLAM 2 MG: 1 INJECTION INTRAMUSCULAR; INTRAVENOUS at 10:01

## 2019-08-31 RX ADMIN — PROPOFOL 150 MG: 10 INJECTION, EMULSION INTRAVENOUS at 10:07

## 2019-08-31 RX ADMIN — CEFTRIAXONE SODIUM 1 G: 2 INJECTION, POWDER, FOR SOLUTION INTRAMUSCULAR; INTRAVENOUS at 10:06

## 2019-08-31 RX ADMIN — ONDANSETRON 4 MG: 2 INJECTION INTRAMUSCULAR; INTRAVENOUS at 10:34

## 2019-08-31 RX ADMIN — IOPAMIDOL 81 ML: 755 INJECTION, SOLUTION INTRAVENOUS at 01:17

## 2019-08-31 RX ADMIN — PHENYLEPHRINE HYDROCHLORIDE 200 MCG: 10 INJECTION INTRAVENOUS at 10:14

## 2019-08-31 RX ADMIN — Medication 5 ML: at 05:37

## 2019-08-31 RX ADMIN — PANTOPRAZOLE SODIUM 40 MG: 40 INJECTION, POWDER, FOR SOLUTION INTRAVENOUS at 05:36

## 2019-08-31 RX ADMIN — POTASSIUM CHLORIDE 20 MEQ: 1500 TABLET, EXTENDED RELEASE ORAL at 01:19

## 2019-08-31 RX ADMIN — HEPARIN SODIUM (PORCINE) LOCK FLUSH IV SOLN 100 UNIT/ML 5 ML: 100 SOLUTION at 07:08

## 2019-08-31 RX ADMIN — OXYCODONE HYDROCHLORIDE 5 MG: 5 TABLET ORAL at 22:48

## 2019-08-31 RX ADMIN — FENTANYL CITRATE 50 MCG: 50 INJECTION, SOLUTION INTRAMUSCULAR; INTRAVENOUS at 10:35

## 2019-08-31 RX ADMIN — FENTANYL CITRATE 50 MCG: 50 INJECTION, SOLUTION INTRAMUSCULAR; INTRAVENOUS at 10:01

## 2019-08-31 RX ADMIN — PHENYLEPHRINE HYDROCHLORIDE 100 MCG: 10 INJECTION INTRAVENOUS at 10:11

## 2019-08-31 RX ADMIN — DEXAMETHASONE SODIUM PHOSPHATE 4 MG: 4 INJECTION, SOLUTION INTRA-ARTICULAR; INTRALESIONAL; INTRAMUSCULAR; INTRAVENOUS; SOFT TISSUE at 10:21

## 2019-08-31 RX ADMIN — HYDROMORPHONE HYDROCHLORIDE 0.5 MG: 1 INJECTION, SOLUTION INTRAMUSCULAR; INTRAVENOUS; SUBCUTANEOUS at 12:04

## 2019-08-31 ASSESSMENT — ENCOUNTER SYMPTOMS
DIZZINESS: 0
LIGHT-HEADEDNESS: 0

## 2019-08-31 ASSESSMENT — COPD QUESTIONNAIRES: COPD: 0

## 2019-08-31 ASSESSMENT — MIFFLIN-ST. JEOR: SCORE: 1262.13

## 2019-08-31 ASSESSMENT — LIFESTYLE VARIABLES: TOBACCO_USE: 0

## 2019-08-31 NOTE — ANESTHESIA CARE TRANSFER NOTE
Patient: Hoad Brush    Procedure(s):  LAPAROSCOPIC CHOLECYSTECTOMY    Diagnosis: UNKNOWN  Diagnosis Additional Information: No value filed.    Anesthesia Type:   General, ETT, RSI     Note:  Airway :Face Mask  Patient transferred to:PACU  Comments: Link Report: Identifed the Patient, Identified the Reponsible Provider, Reviewed the pertinent medical history, Discussed the surgical course, Reviewed Intra-OP anesthesia mangement and issues during anesthesia, Set expectations for post-procedure period and Allowed opportunity for questions and acknowledgement of understanding      Vitals: (Last set prior to Anesthesia Care Transfer)    CRNA VITALS  8/31/2019 1047 - 8/31/2019 1124      8/31/2019             Pulse:  78    SpO2:  97 %    Resp Rate (observed):  4  (Abnormal)                 Electronically Signed By: KATHI Madrid CRNA  August 31, 2019  11:24 AM

## 2019-08-31 NOTE — ED NOTES
"[facility]  ED Nurse Handoff Report    ED Chief complaint: Chest Pain      ED Diagnosis:   Final diagnoses:   Atypical chest pain   Abdominal pain, epigastric       Code Status: Full Code    Allergies: No Known Allergies    Activity level - Baseline/Home:  Independent  Activity Level - Current:   Stand with Assist    Patient's Preferred language: russian   Needed?: Yes    Isolation: No  Infection: Not Applicable  Bariatric?: No    Vital Signs:   Vitals:    08/31/19 0122 08/31/19 0215 08/31/19 0241 08/31/19 0242   BP:   115/57    Pulse:   77    Resp: 11 9 17 18   Temp:       TempSrc:       SpO2: 98% 97% 100% 98%   Weight:       Height:           Cardiac Rhythm: ,   Cardiac  Cardiac Rhythm: Normal sinus rhythm    Pain level:      Is this patient confused?: No   Does this patient have a guardian?  No         If yes, is there guardianship documents in the Epic \"Code/ACP\" activity?  N/A         Guardian Notified?  N/A  [place] - Suicide Severity Rating Scale Completed?  Yes  If yes, what color did the patient score?  White    Patient Report: Initial Complaint: Chest pain started on Sunday. Had an MRI today for these symptoms. Pt this evening pain got worse and is vomiting. Hx of breast cancer  Focused Assessment: epigastric pain  Tests Performed: lab, ct, ultrasound  Abnormal Results:   Results for orders placed or performed during the hospital encounter of 08/30/19   CT Chest/Abdomen/Pelvis w Contrast    Narrative    CT CHEST/ABDOMEN/PELVIS W CONTRAST 8/31/2019 1:27 AM     HISTORY: Chest pain radiating to back; breast cancer with metastasis  to spine.    TECHNIQUE: Volumetric acquisition through chest, abdomen and pelvis  with IV contrast.   81mL Isovue-370  Radiation dose for this scan was  reduced using automated exposure control, adjustment of the mA and/or  kV according to patient size, or iterative reconstruction technique.    COMPARISON: 7/22/2019.    FINDINGS:   Chest: No acute " infiltrates or pleural effusions. Normal heart size.  No pathologic adenopathy. Left mastectomy. Right chest wall port with  catheter tip in the SVC. An area of focal linear atelectasis obscures  the previously seen 0.4 cm nodule in the right middle lobe, probably  stable (series 6, image 202). The 0.6 cm nodule in the left lower lobe  (series 6, image 209) is also stable.    Abdomen and pelvis: Mild hazy stranding about the gallbladder  extending adjacent to the duodenum. The gallbladder is not distended.  No calcified gallstones. Liver, pancreas, spleen, adrenal glands and  kidneys demonstrate no worrisome findings.    Uterine fibroids. No suspicious adnexal masses. No bowel obstruction,  ascites or free air.    Numerous metastatic lesions which are predominantly sclerotic in the  thoracolumbar spine, pelvis and proximal right femur, similar to prior  study.      Impression    IMPRESSION:  1. Slight fat stranding/inflammation surrounding the gallbladder and  adjacent to the proximal duodenum. Differential includes  cholecystitis, duodenitis or peptic ulcer disease. Gallbladder is not  distended and no calcified gallstones are seen.  2. No other acute findings.  3. Stable osseous metastases and small pulmonary nodules.    CARL POOLE MD   US Abdomen Limited (RUQ)    Narrative    US ABDOMEN LIMITED 8/31/2019 2:43 AM    CLINICAL INFORMATION: Fat stranding around gallbladder on CT.    COMPARISON: CT 8/31/2019.    FINDINGS:  Limited right upper quadrant ultrasound demonstrates multiple  nonmobile gallstones and mild gallbladder wall thickening up to 0.6  cm. No sonographic Motta's sign was reported by the sonographer.  Common hepatic duct appears mildly dilated at 1.1 cm, though this is  not appreciated on the CT. Slight prominence of a few central  intrahepatic bile ducts . No focal liver lesion. Visualized portions  of the  pancreas are negative. The visualized portion of the right  kidney is unremarkable. No  hydronephrosis on the right.      Impression    IMPRESSION:   1. Cholelithiasis with gallbladder wall thickening which may indicate  cholecystitis, however no sonographic Motta's sign was noted.  Gallbladder is also not distended.  2. Slight prominence of a few central intrahepatic bile ducts and  equivocal dilatation of the common hepatic duct, not confirmed on CT.  3. No other acute findings.   CBC with platelets differential   Result Value Ref Range    WBC 13.9 (H) 4.0 - 11.0 10e9/L    RBC Count 3.77 (L) 3.8 - 5.2 10e12/L    Hemoglobin 11.2 (L) 11.7 - 15.7 g/dL    Hematocrit 34.9 (L) 35.0 - 47.0 %    MCV 93 78 - 100 fl    MCH 29.7 26.5 - 33.0 pg    MCHC 32.1 31.5 - 36.5 g/dL    RDW 13.8 10.0 - 15.0 %    Platelet Count 260 150 - 450 10e9/L    Diff Method Automated Method     % Neutrophils 65.8 %    % Lymphocytes 23.9 %    % Monocytes 6.6 %    % Eosinophils 3.5 %    % Basophils 0.1 %    % Immature Granulocytes 0.1 %    Nucleated RBCs 0 0 /100    Absolute Neutrophil 9.2 (H) 1.6 - 8.3 10e9/L    Absolute Lymphocytes 3.3 0.8 - 5.3 10e9/L    Absolute Monocytes 0.9 0.0 - 1.3 10e9/L    Absolute Eosinophils 0.5 0.0 - 0.7 10e9/L    Absolute Basophils 0.0 0.0 - 0.2 10e9/L    Abs Immature Granulocytes 0.0 0 - 0.4 10e9/L    Absolute Nucleated RBC 0.0    Troponin I   Result Value Ref Range    Troponin I ES <0.015 0.000 - 0.045 ug/L   Basic metabolic panel   Result Value Ref Range    Sodium 142 133 - 144 mmol/L    Potassium 3.2 (L) 3.4 - 5.3 mmol/L    Chloride 107 94 - 109 mmol/L    Carbon Dioxide 24 20 - 32 mmol/L    Anion Gap 11 3 - 14 mmol/L    Glucose 130 (H) 70 - 99 mg/dL    Urea Nitrogen 19 7 - 30 mg/dL    Creatinine 0.73 0.52 - 1.04 mg/dL    GFR Estimate 88 >60 mL/min/[1.73_m2]    GFR Estimate If Black >90 >60 mL/min/[1.73_m2]    Calcium 8.3 (L) 8.5 - 10.1 mg/dL   D dimer quantitative   Result Value Ref Range    D Dimer 2.4 (H) 0.0 - 0.50 ug/ml FEU   Hepatic panel   Result Value Ref Range    Bilirubin Direct 0.2 0.0 -  0.2 mg/dL    Bilirubin Total 0.4 0.2 - 1.3 mg/dL    Albumin 3.0 (L) 3.4 - 5.0 g/dL    Protein Total 7.3 6.8 - 8.8 g/dL    Alkaline Phosphatase 61 40 - 150 U/L    ALT 23 0 - 50 U/L    AST 27 0 - 45 U/L   Lipase   Result Value Ref Range    Lipase 128 73 - 393 U/L   Troponin I   Result Value Ref Range    Troponin I ES <0.015 0.000 - 0.045 ug/L     Treatments provided:     Family Comments:     OBS brochure/video discussed/provided to patient/family: Yes              Name of person given brochure if not patient:               Relationship to patient:     ED Medications:   Medications   ondansetron (ZOFRAN) injection 4 mg (4 mg Intravenous Given 8/30/19 2346)   iopamidol (ISOVUE-370) solution 81 mL (81 mLs Intravenous Given 8/31/19 0117)   Saline flush (65 mLs Intravenous Given 8/31/19 0118)   potassium chloride ER (K-DUR/KLOR-CON M) CR tablet 20 mEq (20 mEq Oral Given 8/31/19 0119)       Drips infusing?:  No    For the majority of the shift this patient was Green.   Interventions performed were .    Severe Sepsis OR Septic Shock Diagnosis Present: No    To be done/followed up on inpatient unit:      ED NURSE PHONE NUMBER: [phone]

## 2019-08-31 NOTE — ED PROVIDER NOTES
Patient received in sign out with US pending.  US showed signs of gallstones and thickening.  Given CT and US findings, patient will be admitted for surgery consultation in the morning.       Codey Lora MD  08/31/19 9490

## 2019-08-31 NOTE — PHARMACY-ADMISSION MEDICATION HISTORY
Admission medication history interview status for the 8/30/2019  admission is complete. See EPIC admission navigator for prior to admission medications     Medication history source reliability:Good    Actions taken by pharmacist (provider contacted, etc):Interviewed patient     Additional medication history information not noted on PTA med list :  - Deleted: omeprazole 20 mg  - Added: Acetaminophen 1000 mg , Ibuprofen 600 mg  (Pt states that she is not taking omeprazole 20 mg. She took Ibuprofen 600 mg in the morning on 8/28 and switched to Acetaminophen 1000 mg at the same day because her doctor recommended. She has been taking Acetaminophen 1000 mg for 3 days but it did not work. She took Ibuprofen 600 mg at 3 pm yesterday.)    Medication reconciliation/reorder completed by provider prior to medication history? No    Time spent in this activity: 25 minitues    Prior to Admission medications    Medication Sig Last Dose Taking? Auth Provider   acetaminophen (TYLENOL) 500 MG tablet Take 1,000 mg by mouth every 6 hours as needed for mild pain 8/28/2019 at PM Yes Unknown, Entered By History   ibuprofen (ADVIL/MOTRIN) 600 MG tablet Take 600 mg by mouth every 6 hours as needed for moderate pain 8/30/2019 at 3 PM Yes Unknown, Entered By History   melatonin 3 MG tablet Take 1 tablet (3 mg) by mouth nightly as needed for sleep PRN Yes Jossie Sparrow PA   tamoxifen (NOLVADEX) 20 MG tablet Take 1 tablet (20 mg) by mouth daily 8/29/2019 at PM Yes Lisandro Aguiar MD   VITAMIN D, CHOLECALCIFEROL, PO Take 1,000 Units by mouth daily 8/29/2019 at PM Yes Unknown, Entered By History   order for DME Equipment being ordered: 2 Mastectomy bras and 1 prosthetheses.   Jossie Sparrow PA   order for DME L UE class 2 compression sleeve and gauntlet  Night garment  Bandaging supplies   Lisandro Aguiar MD

## 2019-08-31 NOTE — PROGRESS NOTES
RECEIVING UNIT ED HANDOFF REVIEW    ED Nurse Handoff Report was reviewed by: Maciel García RN on August 31, 2019 at 4:19 AM

## 2019-08-31 NOTE — OP NOTE
General Surgery Operative Note    PREOPERATIVE DIAGNOSIS:  Acute Cholecystitis    POSTOPERATIVE DIAGNOSIS:  same    PROCEDURE:  LAPAROSCOPIC CHOLECYSTECTOMY    ANESTHESIA:  General.    PREOPERATIVE MEDICATIONS:  On scheduled antibiotics.    SURGEON:  Maldonado Billy MD    ASSISTANT:  Jennifer Valladares MD    INDICATIONS: Patient presented with signs and symptoms consistent with cholecystitis.  Extensive discussion was undertaken regarding the procedure of cholecystectomy.  The potential risks of bleeding, infection, bile duct or visceral injury were thoroughly reviewed.  All of the patient's questions were answered.  The patient wishes to proceed with cholecystectomy.    PROCEDURE:  After informed consent was obtained the patient was taken to the operating suite and uneventfully endotracheally intubated.  The abdomen was prepped and draped in a sterile fashion.  Surgeon initiated timeout was acknowledged.  After infiltration with local anesthestic a curvilinear incision was made in the infraumbilical position and through this a Verees needle was passed intraperitoneally.  Position was confirmed using the saline drop test. A CO2 pneumoperitoneum was then created.  After adequate insufflation the needle was removed and replaced with an 11mm trocar .  Two other trocars were placed under laparoscopic visualization following the infiltration of local anesthetic.  We elevated the liver but we were initially unable to identify the gallbladder.  There was a very dense inflammatory process around the renan hepatis.  The viscera including the distal stomach/duodenum and omentum were densely adherent to the gallbladder.  I was ultimately through the use of blunt dissection able to separate the gallbladder out from the structures.  The gallbladder itself appeared somewhat contracted and chronically inflamed.  The gallbladder was grasped and used to elevate the liver further.  I began dissecting out some fatty adhesions down near  the neck of the gallbladder until a cystic duct was encountered.  I continued the dissection using combination of sharp and blunt dissection until the cystic duct was largely dissected out.  I continued the dissection up along the sides of the gallbladder, both medially and laterally, until I had created a space between the gallbladder and the liver.  At this point, I encountered the cystic artery, just posterior and lateral to the cystic duct.  This again was dissected out.  Once I had created a window where only the cystic artery and duct were noted to be entering the gallbladder, I felt that this represented our critical view.  The cystic artery was then doubly clipped and divided.  The duct itself was quite thickened and I tried initially to ligated with clips.  I was not satisfied with this so I therefore exchanged my subxiphoid 5 mm port for a 12 mm port and divided the duct with the stapling device.  I continued the dissection up along the body of the gallbladder, freeing all attachments and adhesions of the gallbladder to the liver.  Gallbladder was removed from the liver in an atraumatic fashion.  This also was quite inflamed and this dissection plane was not completely normal.  The gallbladder was then placed in a retrieval pouch and removed from the abdomen.  The gallbladder fossa was reinspected, and all areas of bleeding were managed with electrocautery.  I irrigated the area with normal saline and aspirated it out.  I then reinspected the abdomen, there was a somewhat superficial tear along the anterior surface of the duodenum.  I oversewed this with 2 interrupted 2-0 Vicryl sutures.  I placed a piece of Nu-Knit within the gallbladder fossa for additional hemostasis. The trocars were removed and carbon dioxide was massaged from the abdomen. Local anesthetic was injected. Fascia at the 11mm port site was closed with 0 vicryl suture.  Skin incisions were closed with subcuticular 4-0 Monocryl sutures.   The patient tolerated this procedure without difficulty. Needle and sponge counts were correct. The patient was taken from the operating room To the recovery room in a stable condition.      ESTIMATED BLOOD LOSS:  50cc    Maldonado Billy MD

## 2019-08-31 NOTE — CONSULTS
General Surgery Consultation    Hoda Brush MRN# [id number]   Age: 64 year old YOB: 1955     Date of Admission:  8/30/2019    Reason for consult: Cholecystitis       Requesting physician: Rosemary                Assessment and Plan:   Assessment:   Acute calculus cholecystitis      Plan:   Pathophysiology of this disease was described.  I reviewed the recommended therapy is being laparoscopic cholecystectomy.  Procedure was described in detail.  I reviewed the risks, benefits and outcomes.  Patient's questions were answered and she consents to proceed.             Chief Complaint:   Epigastric abdominal pain     History is obtained from the patient and electronic health record         History of Present Illness:   This patient is a 64 year old  female  who presents with epigastric abdominal pain.  She reports that her symptoms began several days ago.  This is a pain right below her rib cage in the epigastric area.  She manage this initially with ibuprofen with waxing and waning symptoms.  She was concerned that this may have been related to her known metastatic disease to her spine.  She was seen and evaluated by her neurologist.  MRI did not show any correlation to her symptoms.  Yesterday her symptoms began to get significantly worse.  She presented to the emergency department and was evaluated.  A CT scan of the abdomen and pelvis revealed inflammation around the gallbladder and duodenum.  Subsequent ultrasound revealed non-mobile gallstones and sequela of acute cholecystitis.  She had nausea through the course of this illness and occasional vomiting.  She denied fevers or chills.  Last chemotherapy was approximately 2 weeks ago.          Past Medical History:    has a past medical history of Breast cancer metastasized to bone (H) and Lymphedema of left upper extremity.          Past Surgical History:     Past Surgical History:   Procedure Laterality Date     APPENDECTOMY       COLONOSCOPY  "N/A 2/1/2018    Procedure: COLONOSCOPY;  Colonoscopy;  Surgeon: Phillip Rowe MD;  Location: [facility]     ESOPHAGOSCOPY, GASTROSCOPY, DUODENOSCOPY (EGD), COMBINED N/A 2/16/2018    Procedure: COMBINED ESOPHAGOSCOPY, GASTROSCOPY, DUODENOSCOPY (EGD), BIOPSY SINGLE OR MULTIPLE;;  Surgeon: Paty Herman MD;  Location: [facility]     INSERT PORT VASCULAR ACCESS Right 9/15/2017    Procedure: INSERT PORT VASCULAR ACCESS;  Central venous chest port placement, right;  Surgeon: Dmitriy Hernandez PA-C;  Location: [facility]     MASTECTOMY Left 06/27/2014    and lymph node resection     MASTECTOMY SIMPLE BILATERAL      Mastectomy 06/27/2014           Medications:     Current Facility-Administered Medications on File Prior to Encounter:  [COMPLETED] gadobutrol (GADAVIST) injection 7 mL     Current Outpatient Medications on File Prior to Encounter:  melatonin 3 MG tablet Take 1 tablet (3 mg) by mouth nightly as needed for sleep   order for DME Equipment being ordered: 2 Mastectomy bras and 1 prosthetheses.   order for DME L UE class 2 compression sleeve and gauntletNight garmentBandaging supplies   tamoxifen (NOLVADEX) 20 MG tablet Take 1 tablet (20 mg) by mouth daily   VITAMIN D, CHOLECALCIFEROL, PO Take 1,000 Units by mouth daily         Allergies:    No Known Allergies         Social History:   She is , accompanied by her daughter, denies alcohol or tobacco          Family History:   The patient has no family history of any bleeding, clotting or anesthesia problems.          Review of Systems:   Ten point Review of Systems is negative other than noted in the HPI          Physical Exam:   Gen:  This is a well developed, well nourished woman in no apparent distress.  Blood pressure 119/56, pulse 86, temperature 98.3  F (36.8  C), temperature source Oral, resp. rate 16, height 1.575 m (5' 2\"), weight 75.4 kg (166 lb 3.2 oz), SpO2 97 %.  HEENT - Normocephalic, atraumatic, mucous membranes moist.  No scleral " icterus.  Neck - supple without masses  Lungs - clear to ascultation.    Heart - Regular rate & rhythm without murmur  Abdomen:   soft, non-distended, tenderness noted in the epigastric region and in the right upper quadrant Motta's sign is present and no masses palpated, normal bowel sounds   Extremities - warm without edema  Neurologic - nonfocal          Data:   WBC -   WBC   Date Value Ref Range Status   08/31/2019 10.6 4.0 - 11.0 10e9/L Final   ], HgB - Hemoglobin   Date Value Ref Range Status   08/31/2019 10.6 (L) 11.7 - 15.7 g/dL Final   ]   Liver Function Studies -   Recent Labs   Lab Test 08/31/19  0700   PROTTOTAL 6.9   ALBUMIN 2.7*   BILITOTAL 1.0   ALKPHOS 104   *   *     CT scan of the abdomen: Reviewed      Ultrasound of the abdomen:     Maldonado Billy MD

## 2019-08-31 NOTE — ANESTHESIA POSTPROCEDURE EVALUATION
Patient: Hoda Brush    Procedure(s):  LAPAROSCOPIC CHOLECYSTECTOMY    Diagnosis:UNKNOWN  Diagnosis Additional Information: No value filed.    Anesthesia Type:  General, ETT, RSI    Note:  Anesthesia Post Evaluation    Patient location during evaluation: PACU  Patient participation: Able to fully participate in evaluation  Level of consciousness: awake and alert  Pain management: adequate  Airway patency: patent  Cardiovascular status: acceptable  Respiratory status: acceptable  Hydration status: acceptable  PONV: none     Anesthetic complications: None          Last vitals:  Vitals:    08/31/19 1220 08/31/19 1230 08/31/19 1240   BP: 127/52 130/68 130/67   Pulse: 75 76 79   Resp: 12 15 14   Temp:      SpO2: 98% 97% 97%         Electronically Signed By: Reno Hale MD  August 31, 2019  12:50 PM

## 2019-08-31 NOTE — BRIEF OP NOTE
[facility] Brief Operative Note    Pre-operative diagnosis: Concern for cholecystitis.   Post-operative diagnosis Acute on chronic cholecystitis   Procedure: Procedure(s):  LAPAROSCOPIC CHOLECYSTECTOMY   Surgeon(s): Surgeon(s) and Role:     * Maldonado Billy MD - Primary     * Jennifer Valladares MD - Resident - Assisting   Estimated blood loss: * No values recorded between 8/31/2019 10:24 AM and 8/31/2019 11:17 AM *    Specimens: ID Type Source Tests Collected by Time Destination   A : gallbladder and contents Tissue Gallbladder and Contents SURGICAL PATHOLOGY EXAM Maldonado Billy MD 8/31/2019 10:33 AM       Findings: Acute on chronic cholecystitis.  Gall bladder somewhat atrophic, significant surrounding inflammation involving omentum, colon.

## 2019-08-31 NOTE — ED TRIAGE NOTES
Chest pain started on Sunday. Had an MRI today for these symptoms. Pt this evening pain got worse and is vomiting.

## 2019-08-31 NOTE — ANESTHESIA PREPROCEDURE EVALUATION
Anesthesia Pre-Procedure Evaluation    Patient: Hoda Brush   MRN: [id number] : 1955          Preoperative Diagnosis: UNKNOWN    Procedure(s):  LAPAROSCOPIC CHOLECYSTECTOMY    Past Medical History:   Diagnosis Date     Breast cancer metastasized to bone (H)      Lymphedema of left upper extremity      Past Surgical History:   Procedure Laterality Date     APPENDECTOMY       COLONOSCOPY N/A 2018    Procedure: COLONOSCOPY;  Colonoscopy;  Surgeon: Phillip Rowe MD;  Location: [facility]     ESOPHAGOSCOPY, GASTROSCOPY, DUODENOSCOPY (EGD), COMBINED N/A 2018    Procedure: COMBINED ESOPHAGOSCOPY, GASTROSCOPY, DUODENOSCOPY (EGD), BIOPSY SINGLE OR MULTIPLE;;  Surgeon: Paty Herman MD;  Location: [facility]     INSERT PORT VASCULAR ACCESS Right 9/15/2017    Procedure: INSERT PORT VASCULAR ACCESS;  Central venous chest port placement, right;  Surgeon: Dmitriy Hernandez PA-C;  [facility]     MASTECTOMY Left 2014    and lymph node resection     MASTECTOMY SIMPLE BILATERAL      Mastectomy 2014       Anesthesia Evaluation     . Pt has had prior anesthetic. Type: General    No history of anesthetic complications          ROS/MED HX    ENT/Pulmonary:      (-) tobacco use, asthma and COPD   Neurologic:      (-) CVA, TIA and Neuropathy   Cardiovascular:        (-) hypertension, CAD, irregular heartbeat/palpitations and stent   METS/Exercise Tolerance:     Hematologic:        (-) anemia   Musculoskeletal:         GI/Hepatic:     (+) cholecystitis/cholelithiasis,      (-) GERD and liver disease   Renal/Genitourinary:      (-) renal disease   Endo:     (+) Obesity, .   (-) Type I DM, Type II DM and thyroid disease   Psychiatric:         Infectious Disease:  - neg infectious disease ROS       Malignancy:   (+) Malignancy History of Breast          Other:                          Physical Exam  Normal systems: cardiovascular and pulmonary    Airway   Mallampati: II  TM distance: >3  "FB  Neck ROM: full    Dental   (+) upper dentures, partials and missing    Cardiovascular   Rhythm and rate: regular and normal      Pulmonary    breath sounds clear to auscultation            Lab Results   Component Value Date    WBC 10.6 2019    HGB 10.6 (L) 2019    HCT 32.9 (L) 2019     2019    CRP 5.4 2017    SED 19 2017     2019    POTASSIUM 4.0 2019    CHLORIDE 110 (H) 2019    CO2 27 2019    BUN 12 2019    CR 0.69 2019     (H) 2019    TAYLER 8.4 (L) 2019    ALBUMIN 2.7 (L) 2019    PROTTOTAL 6.9 2019     (H) 2019     (H) 2019    ALKPHOS 104 2019    BILITOTAL 1.0 2019    LIPASE 128 2019    INR 0.99 2017    TSH 1.12 2019       Preop Vitals  BP Readings from Last 3 Encounters:   19 119/56   19 110/70   19 107/67    Pulse Readings from Last 3 Encounters:   19 86   19 87   19 76      Resp Readings from Last 3 Encounters:   19 16   19 20   19 18    SpO2 Readings from Last 3 Encounters:   19 97%   19 97%   19 96%      Temp Readings from Last 1 Encounters:   19 36.8  C (98.3  F) (Oral)    Ht Readings from Last 1 Encounters:   19 1.575 m (5' 2\")      Wt Readings from Last 1 Encounters:   19 75.4 kg (166 lb 3.2 oz)    Estimated body mass index is 30.4 kg/m  as calculated from the following:    Height as of this encounter: 1.575 m (5' 2\").    Weight as of this encounter: 75.4 kg (166 lb 3.2 oz).       Anne Flores MD 2019       Narrative     [id number]  [id number]  [id number]  [id number]^MUMTAZ^AIDA^KRISHNA           [facility]  Diagnostic and Treamtent-3rd Floor  [address] [place] [address]     Name: NATASHA LOUIS  MRN: [id number]  : 1955  Study Date: 2019 11:40 AM  Age: 64 " yrs  Gender: Female  Patient Location: [facility]  Reason For Study: Malignant neoplasm of left female breast  Ordering Physician: AIDA PANDYA  Referring Physician: AIDA PANDYA  Performed By: CELSO Addison Magdaleno     BSA: 1.8 m2  Height: 63 in  Weight: 177 lb  BP: 122/80 mmHg  _____________________________________________________________________________  __        Procedure  Echocardiogram with two-dimensional, color and spectral Doppler performed.  _____________________________________________________________________________  __        Interpretation Summary  Normal left ventricular systolic function. LVEF 62% based on volumetric 3D  analysis.  Left ventricular diastolic function is normal.  Global peak LV longitudinal strain is averaged at -21.8%. This is within  reported normal limits (normal <-18%).  No significant change compared to prior study.         Anesthesia Plan      History & Physical Review  History and physical reviewed and following examination; no interval change.    ASA Status:  2 .    NPO Status:  > 6 hours    Plan for General, ETT and RSI with Intravenous induction. Maintenance will be Balanced.    PONV prophylaxis:  Ondansetron (or other 5HT-3), Dexamethasone or Solumedrol and Other (See comment)  Additional equipment: Videolaryngoscope   Propofol infusion      Postoperative Care  Postoperative pain management:  IV analgesics and Oral pain medications.      Consents  Anesthetic plan, risks, benefits and alternatives discussed with:  Patient..                 Reno Hale MD

## 2019-08-31 NOTE — ED PROVIDER NOTES
History     Chief Complaint:  Chest pain     HPI   A family member was used obtain the below history as well as to explain diagnosis, plan of treatment, reasons to return to the emergency department and appropriate follow up.  Hoda Brush is a 64 year old female with a history of breast cancer metastasized to bone who presents with chest pain. The patient has been doing chemotherapy for the past 5 years. 5 days ago, the patient noticed sternal pain at a severity of 9/10. It has since been intermittent and the pain radiates through to the back and down into the abdomen. She made an appointment and an MRI was ordered which showed no clear cause. The patient has been taking ibuprofen nilda 6 hours. Earlier today (Friday), the patient went to bed and woke up with sudden sharp pain in same sternal region but this time the pain was at 15/10 and associated with some nausea. Here, the patient states her pain is at a 4/10 when not breathing and 7/10 with breathing. She denies any lightheadedness, dizziness, hematemesis, leg swelling, or history of heart problems.     Allergies:  The patient has no known drug allergies.    Medications:    Nolvadex     Past Medical History:    Breast cancer metastasized to bone   Cellulitis of left upper extremity     Past Surgical History:    Appendectomy   Colonoscopy   Esophagoscopy, gastroscopy, and duodenoscopy   Insert port vascular access   Mastectomy    Family History:    Breast cancer   Lung cancer     Social History:  Marital Status:     Smoker:   Never   Smokeless:   Never   Alcohol:   No   Drugs:   No   Arrives with:   Daughter      Review of Systems   Cardiovascular: Positive for chest pain. Negative for leg swelling.   Neurological: Negative for dizziness and light-headedness.   All other systems reviewed and are negative.    Physical Exam     Patient Vitals for the past 24 hrs:   BP Temp Temp src Heart Rate Resp SpO2 Height Weight   08/31/19 0045 -- -- -- 84  "15 94 % -- --   08/31/19 0039 -- -- -- 84 12 94 % -- --   08/31/19 0010 -- -- -- 88 20 96 % -- --   08/30/19 2323 (!) 147/84 96.3  F (35.7  C) Temporal 101 18 100 % 1.575 m (5' 2\") 72.6 kg (160 lb)     Physical Exam  Gen: Pleasant, appears stated age.    Eye:   Pupils are equal, round, and reactive.     Sclera non-injected.    ENT:   Moist mucus membranes.     Normal tongue.    Oropharynx without lesions.    Cardiac:     Normal rate and regular rhythm.    No murmurs, gallops, or rubs.     Pulmonary:     Clear to auscultation bilaterally.    No wheezes, rales, or rhonchi.    Abdomen:     Normal active bowel sounds.     Abdomen is soft and non-distended, with mild epigastric tenderness.  No rebound or guarding.    Musculoskeletal:     Normal movement of all extremities without evidence for deficit. Port right chest wall. 2+ radial pulses.    Extremities:    No edema.    Skin:   Warm and dry.    Neurologic:    Non-focal exam without asymmetric weakness or numbness.    Normal tone    Psychiatric:     Normal affect with appropriate interaction with examiner.    Emergency Department Course   ECG:  Indication: chest pain   Time: 2331  Vent. Rate 78 bpm. IN interval 210. QRS duration 72. QT/QTc 418/476. P-R-T axis 35 22 47. Sinus rhythm with 1st degree AV block. Otherwise normal ECG. Read time: 0027    Imaging:  Radiographic findings were communicated with the patient and family who voiced understanding of the findings.    US Abdomen Limited (RUQ)   Final Result   IMPRESSION:    1. Cholelithiasis with gallbladder wall thickening which may indicate   cholecystitis, however no sonographic Motta's sign was noted.   Gallbladder is also not distended.   2. Slight prominence of a few central intrahepatic bile ducts and   equivocal dilatation of the common hepatic duct, not confirmed on CT.   3. No other acute findings.      CARL POOLE MD      CT Chest/Abdomen/Pelvis w Contrast   Final Result   IMPRESSION:   1. Slight fat " stranding/inflammation surrounding the gallbladder and   adjacent to the proximal duodenum. Differential includes   cholecystitis, duodenitis or peptic ulcer disease. Gallbladder is not   distended and no calcified gallstones are seen.   2. No other acute findings.   3. Stable osseous metastases and small pulmonary nodules.      CARL POOLE MD        Laboratory:  BMP: Glucose 130, potassium 3.2, calcium 8.3, o/w WNL (Creatinine: 0.73)  CBC: WBC: 13.9, HGB: 11.2, PLT: 260  2346 Troponin: <0.015   Lipase: 128  D-Dimer: 2.4  Hepatic panel: albumin 3.0, o/w WNL  Interventions:  Medications   iopamidol (ISOVUE-370) solution 81 mL (has no administration in time range)   Saline flush (has no administration in time range)   ondansetron (ZOFRAN) injection 4 mg (4 mg Intravenous Given 8/30/19 2346)     Emergency Department Course:  0004 I performed an exam of the patient as documented above.   0200 I rechecked the patient and discussed the results of their workup thus far.     0215 Patient signed out to Dr. Lora pending ultrasound results.    Findings and plan explained to the patient and daughter.    Impression & Plan      Medical Decision Making:  This is a 64yoF with history of metastatic breast cancer who presents with severe chest pain radiating to the back, associated with vomiting.  On exam, the patient has symmetric radial pulses and a benign abdomen.  Labs demonstrate a mild leukocytosis and chronic anemia.  CT of the chest/abd/pelvis was obtained out of concern for dissection.  There is no aortic pathology, no large PE, and no pericardial effusion.  The study demonstrates fat stranding around the duodenum and gallbladder.  My concern for cholecystitis is low given no Motta sign on exam, however, RUQ ultrasound will be obtained.  If negative for gallstones or cholecystitis, the patient will be discharged to follow up with PCP and gastroenterology for possible peptic ulcer disease.  She will be started on  omeprazole. No free air on CT, nor signs of peritonitis makes perforation unlikely. Otherwise, she will return to the ED for worsening pain, fever, inability to tolerate oral fluids, or for other concerns.    Diagnosis:    ICD-10-CM    1. Atypical chest pain R07.89    2. Abdominal pain, epigastric R10.13        Discharge Medications:  New Prescriptions    OMEPRAZOLE (PRILOSEC) 20 MG DR CAPSULE    Take 1 capsule (20 mg) by mouth daily     Scribe Disclosure:  Mc KIMBLE, am serving as a scribe on 8/31/2019 at 12:04 AM to personally document services performed by Kaylen Fox MD based on my observations and the provider's statements to me.     Mc Gallegos  8/30/2019    EMERGENCY DEPARTMENT       Kaylen Fox MD  08/31/19 0352

## 2019-08-31 NOTE — PLAN OF CARE
A/O x4, Georgian speaking, daughter at bedside.  was available during surgical procedure. Incisional pain 2/10, declined medication. 3 lap sites c/d/i, no drainage. CMS intact. VSS on 2L of O2. Using . Capno 41/10. PCD working. IV fluids are infusing and should be weaned off. Right port accessible for blood draw.  Limb alert L arm, previous mastectomy. Chemo precautions, last chemo was 2 weeks ago.Clear liquids. No c/o nausea. Due to void. Pt will stay over night. Will continue to monitor.

## 2019-08-31 NOTE — H&P
[facility]    History and Physical  Hospitalist       Date of Admission:  8/30/2019  Date of Service (when I saw the patient): 08/31/19    ASSESSMENT  Hoda Brush is a markedly pleasant 64 year old woman with Stage 4 breast cancer metastatic to bone status post mastectomy, on chemotherapy and Tamoxifen; who presents with abdominal pain and found to have suspected acute cholecystitis.    PLAN    1) Suspected acute cholecystitis: Differential could include PUD based on CT, in the setting of recent NSAID use; however US seems consistent with possible cholecystitis. We note that she had an EGD in 2018 that showed normal esophagus and duodenum; mildly erythematous stomach biopsied and negative for H pylori or malignancy.     -- Observation. NPO. Surgery consulted. Ceftriaxone continued empirically. Will order a dose of IV Protonix, to be continued at AM teams' discretion. IV NS ordered. Tylenol, Oxycodone, IV Dilaudid as needed for pain. Anti-emetics as needed.    2) Breast cancer: She is followed by  Oncology. She was reportedly diagnosed with metastatic disease at time of presentation, which was in [facility] in 2014; and underwent left mastectomy with targeted radiation to bone lesions and chemotherapy. She is currently on Tamoxifen, Herceptin, and every 6 weeks (including 9/3/2019) IV infusion of Denosumab.      -- If it looks like her course will become more complicated and that she may stay longer than a day, could consider reaching out to her Oncology team while she is here to help coordinate her care    3) Chronic anemia: Mild, no signs of bleeding at present; monitor while hospitalized    4) Hypokalemia: Replacing    Chief Complaint   Abdominal pain    History is obtained from the patient, her daughter and the ED physician whom I have spoken with    History of Present Illness   Hoda Brush is a markedly pleasant 64 year old [place]ian speaking woman who wishes her daughter at the  "bedside to interpret for her; she presents with abdominal pain. This started about a week ago while at rest; it is epigastric and radiates to the mid and upper back. It has been associated with low appetite. It was at first intermittent and she took Motrin with partial relief during the week, but it has never really gone away. She denies diarrhea or constipation during this time, or melena or hematochezia.  She noticed a spell of shaking chills three days ago. Yesterday she saw a Neurologist for evaluation of the pain, and an MRI of the thoracic spine was done to rule out compressive fractures or progressive metastases as a cause of symptoms; this MRI was negative for such findings. However overall during that stay she started to feel better, and went to bed this evening but woke up 11 PM with new onset sharp, severe pain in the middle and right upper part of the abdomen, and she began to vomit non-bloody emesis several times. Thus she came to the ED, where she has been given Ceftriaxone and Zofran, with partial relief of symptoms. She denies any other acute complaints.    In the ED, Blood pressure 115/57, pulse 77, temperature 96.3  F (35.7  C), temperature source Temporal, resp. rate 18, height 1.575 m (5' 2\"), weight 72.6 kg (160 lb), SpO2 98 %.    CBC showed WBC 14, HGB 11 (it was 11.7 on 8/13/2019), . CMP notable for K 3.2, Ca 8.3, Alb 3.0, otherwise within normal reference range. Lipase 138 and two Troponins negative. EKG showed NSR without acute ST segment changes. D Dimer was 2.4.     CT Chest, abdomen and pelvis showed:  \"IMPRESSION:  1. Slight fat stranding/inflammation surrounding the gallbladder and adjacent to the proximal duodenum. Differential includes  cholecystitis, duodenitis or peptic ulcer disease. Gallbladder is not distended and no calcified gallstones are seen.  2. No other acute findings.  3. Stable osseous metastases and small pulmonary nodules\"    US Abdomen " "showed:  \"FINDINGS:  Limited right upper quadrant ultrasound demonstrates multiple  nonmobile gallstones and mild gallbladder wall thickening up to 0.6 cm. No sonographic Motta's sign was reported by the sonographer. Common hepatic duct appears mildly dilated at 1.1 cm, though this is not appreciated on the CT. Slight prominence of a few central intrahepatic bile ducts . No focal liver lesion. Visualized portions of the  pancreas are negative. The visualized portion of the right kidney is unremarkable. No hydronephrosis on the right.\"    PHYSICAL EXAM  Blood pressure 115/57, pulse 77, temperature 96.3  F (35.7  C), temperature source Temporal, resp. rate 18, height 1.575 m (5' 2\"), weight 72.6 kg (160 lb), SpO2 98 %.  Constitutional: Alert and oriented to person, place and time; no apparent distress  HEENT: normocephalic moist mucus membranes  Respiratory: lungs clear to auscultation bilaterally  Cardiovascular: regular S1 S2   GI: abdomen soft, tender in epigastrium, non distended bowel sounds positive  Lymph/Hematologic: no pallor, no cervical lymphadenopathy  Skin: no rash, good turgor  Musculoskeletal: mild LUE edema, less in RUE; right sided chest wall port site c/d/i  Neurologic: extra-ocular muscles intact; moves all four extremities  Psychiatric: appropriate affect, insight and judgment     DVT Prophylaxis: Pneumatic Compression Devices  Code Status: Full Code    Disposition: Expected discharge in 0-2 days    Jelani Riley MD    Past Medical History    I have reviewed this patient's medical history and updated it with pertinent information if needed.   Past Medical History:   Diagnosis Date     Breast cancer metastasized to bone (H)      Lymphedema of left upper extremity        Past Surgical History   I have reviewed this patient's surgical history and updated it with pertinent information if needed.  Past Surgical History:   Procedure Laterality Date     APPENDECTOMY       COLONOSCOPY N/A 2/1/2018 "    Procedure: COLONOSCOPY;  Colonoscopy;  Surgeon: Phillip Rowe MD;  Location:  GI     ESOPHAGOSCOPY, GASTROSCOPY, DUODENOSCOPY (EGD), COMBINED N/A 2/16/2018    Procedure: COMBINED ESOPHAGOSCOPY, GASTROSCOPY, DUODENOSCOPY (EGD), BIOPSY SINGLE OR MULTIPLE;;  Surgeon: Paty Herman MD;  Location: [facility]     INSERT PORT VASCULAR ACCESS Right 9/15/2017    Procedure: INSERT PORT VASCULAR ACCESS;  Central venous chest port placement, right;  Surgeon: Dmitriy Hernandez PA-C;  Location: [facility]     MASTECTOMY Left 06/27/2014    and lymph node resection     MASTECTOMY SIMPLE BILATERAL      Mastectomy 06/27/2014       Prior to Admission Medications   Prior to Admission Medications   Prescriptions Last Dose Informant Patient Reported? Taking?   VITAMIN D, CHOLECALCIFEROL, PO  Self Yes No   Sig: Take 1,000 Units by mouth daily   melatonin 3 MG tablet   No No   Sig: Take 1 tablet (3 mg) by mouth nightly as needed for sleep   order for DME   No No   Sig: L UE class 2 compression sleeve and gauntlet  Night garment  Bandaging supplies   order for DME   No No   Sig: Equipment being ordered: 2 Mastectomy bras and 1 prosthetheses.   tamoxifen (NOLVADEX) 20 MG tablet   No No   Sig: Take 1 tablet (20 mg) by mouth daily      Facility-Administered Medications: None     Allergies   No Known Allergies    Social History   I have reviewed this patient's social history and updated it with pertinent information if needed. Hoda Brush  reports that she has never smoked. She has never used smokeless tobacco. She reports that she does not drink alcohol or use drugs.    Family History   Family history assessed and, except as above, is non-contributory.    Family History   Problem Relation Age of Onset     Breast Cancer Mother 45     Lung Cancer Father         smoker     Breast Cancer Sister 61       Review of Systems   The 10 point Review of Systems is negative other than noted in the HPI or here.     Primary Care  Physician   Physician No Ref-Primary    Data   Labs Ordered and Resulted from Time of ED Arrival Up to the Time of Departure from the ED   CBC WITH PLATELETS DIFFERENTIAL - Abnormal; Notable for the following components:       Result Value    WBC 13.9 (*)     RBC Count 3.77 (*)     Hemoglobin 11.2 (*)     Hematocrit 34.9 (*)     Absolute Neutrophil 9.2 (*)     All other components within normal limits   BASIC METABOLIC PANEL - Abnormal; Notable for the following components:    Potassium 3.2 (*)     Glucose 130 (*)     Calcium 8.3 (*)     All other components within normal limits   D DIMER QUANTITATIVE - Abnormal; Notable for the following components:    D Dimer 2.4 (*)     All other components within normal limits   HEPATIC PANEL - Abnormal; Notable for the following components:    Albumin 3.0 (*)     All other components within normal limits   TROPONIN I   LIPASE   TROPONIN I   PERIPHERAL IV CATHETER   CARDIAC CONTINUOUS MONITORING       Data reviewed today:  I personally reviewed the EKG tracing showing NSR.    Recent Results (from the past 24 hour(s))   MR Thoracic Spine w/o & w Contrast    Narrative    MRI THORACIC SPINE WITHOUT AND WITH CONTRAST  8/30/2019 9:45 AM     HISTORY: History of left breast cancer with bone metastases,  moderate-to-severe pain in thoracic region on the left.    TECHNIQUE: Multiplanar multisequence MRI of the thoracic spine without  and with 7 mL Gadavist.    COMPARISON: CT chest, abdomen and pelvis 7/22/2019.    FINDINGS:  Abnormal T1 and T2 hypointense lesions compatible with  sclerotic bony metastases also seen on CT are seen in multiple  vertebral bodies, particularly on the right at T7-T8, and T9,  posteriorly at the level of T11, and posteriorly at the level of L1.  No associated STIR hyperintense marrow edema appreciated with the  sclerotic lesions. No loss of vertebral body height. No evidence of  acute fractures.    Alignment of the thoracic spine is within normal limits.  No  significant loss of intervertebral disc height. No significant disc  herniations. No significant spinal canal or neural foraminal narrowing  within the visualized levels.     No definite abnormal signal within the visualized spinal cord. No  abnormal leptomeningeal enhancement along the spinal cord.    Partially visualized degenerative changes in the lower cervical spine  at the level of C6-C7.    Paraspinous soft tissues are unremarkable.      Impression    IMPRESSION:    1. Multiple sclerotic bony metastases are again seen throughout the  spine. No evidence of pathologic fracture or vertebral body collapse.  2. No significant degenerative changes in the thoracic spine. No disc  herniations. No spinal canal or neural foraminal narrowing.    SUPA BURKS MD   CT Chest/Abdomen/Pelvis w Contrast    Narrative    CT CHEST/ABDOMEN/PELVIS W CONTRAST 8/31/2019 1:27 AM     HISTORY: Chest pain radiating to back; breast cancer with metastasis  to spine.    TECHNIQUE: Volumetric acquisition through chest, abdomen and pelvis  with IV contrast.   81mL Isovue-370  Radiation dose for this scan was  reduced using automated exposure control, adjustment of the mA and/or  kV according to patient size, or iterative reconstruction technique.    COMPARISON: 7/22/2019.    FINDINGS:   Chest: No acute infiltrates or pleural effusions. Normal heart size.  No pathologic adenopathy. Left mastectomy. Right chest wall port with  catheter tip in the SVC. An area of focal linear atelectasis obscures  the previously seen 0.4 cm nodule in the right middle lobe, probably  stable (series 6, image 202). The 0.6 cm nodule in the left lower lobe  (series 6, image 209) is also stable.    Abdomen and pelvis: Mild hazy stranding about the gallbladder  extending adjacent to the duodenum. The gallbladder is not distended.  No calcified gallstones. Liver, pancreas, spleen, adrenal glands and  kidneys demonstrate no worrisome findings.    Uterine fibroids.  No suspicious adnexal masses. No bowel obstruction,  ascites or free air.    Numerous metastatic lesions which are predominantly sclerotic in the  thoracolumbar spine, pelvis and proximal right femur, similar to prior  study.      Impression    IMPRESSION:  1. Slight fat stranding/inflammation surrounding the gallbladder and  adjacent to the proximal duodenum. Differential includes  cholecystitis, duodenitis or peptic ulcer disease. Gallbladder is not  distended and no calcified gallstones are seen.  2. No other acute findings.  3. Stable osseous metastases and small pulmonary nodules.    CARL POOLE MD   US Abdomen Limited (RUQ)    Narrative    US ABDOMEN LIMITED 8/31/2019 2:43 AM    CLINICAL INFORMATION: Fat stranding around gallbladder on CT.    COMPARISON: CT 8/31/2019.    FINDINGS:  Limited right upper quadrant ultrasound demonstrates multiple  nonmobile gallstones and mild gallbladder wall thickening up to 0.6  cm. No sonographic Motta's sign was reported by the sonographer.  Common hepatic duct appears mildly dilated at 1.1 cm, though this is  not appreciated on the CT. Slight prominence of a few central  intrahepatic bile ducts . No focal liver lesion. Visualized portions  of the  pancreas are negative. The visualized portion of the right  kidney is unremarkable. No hydronephrosis on the right.      Impression    IMPRESSION:   1. Cholelithiasis with gallbladder wall thickening which may indicate  cholecystitis, however no sonographic Motta's sign was noted.  Gallbladder is also not distended.  2. Slight prominence of a few central intrahepatic bile ducts and  equivocal dilatation of the common hepatic duct, not confirmed on CT.  3. No other acute findings.    CARL POOLE MD

## 2019-08-31 NOTE — PLAN OF CARE
A/O x4, [place] citizen speaking,  ordered, daughter at bedside. C/o chest/ab pain, 3/10, declined medication. BS +, flatus -. R port WDL, hep locked. Limb alert L arm, previous mastectomy. Chemo precautions, last chemo was 2 weeks ago. NPO, surgery consult today. Will continue to monitor.

## 2019-09-01 VITALS
SYSTOLIC BLOOD PRESSURE: 102 MMHG | WEIGHT: 166.5 LBS | HEIGHT: 62 IN | RESPIRATION RATE: 16 BRPM | OXYGEN SATURATION: 94 % | BODY MASS INDEX: 30.64 KG/M2 | TEMPERATURE: 97.9 F | HEART RATE: 81 BPM | DIASTOLIC BLOOD PRESSURE: 52 MMHG

## 2019-09-01 PROCEDURE — 25000132 ZZH RX MED GY IP 250 OP 250 PS 637: Performed by: STUDENT IN AN ORGANIZED HEALTH CARE EDUCATION/TRAINING PROGRAM

## 2019-09-01 PROCEDURE — 25000128 H RX IP 250 OP 636: Performed by: STUDENT IN AN ORGANIZED HEALTH CARE EDUCATION/TRAINING PROGRAM

## 2019-09-01 PROCEDURE — 99217 ZZC OBSERVATION CARE DISCHARGE: CPT | Performed by: HOSPITALIST

## 2019-09-01 PROCEDURE — G0378 HOSPITAL OBSERVATION PER HR: HCPCS

## 2019-09-01 PROCEDURE — 96376 TX/PRO/DX INJ SAME DRUG ADON: CPT

## 2019-09-01 RX ORDER — SENNA AND DOCUSATE SODIUM 50; 8.6 MG/1; MG/1
1 TABLET, FILM COATED ORAL AT BEDTIME
Qty: 24 TABLET | Refills: 0 | Status: SHIPPED | OUTPATIENT
Start: 2019-09-01 | End: 2021-10-21

## 2019-09-01 RX ORDER — OXYCODONE HYDROCHLORIDE 5 MG/1
5 TABLET ORAL
Qty: 12 TABLET | Refills: 0 | Status: SHIPPED | OUTPATIENT
Start: 2019-09-01 | End: 2020-03-17

## 2019-09-01 RX ADMIN — OXYCODONE HYDROCHLORIDE 5 MG: 5 TABLET ORAL at 04:58

## 2019-09-01 RX ADMIN — CEFTRIAXONE SODIUM 2 G: 2 INJECTION, POWDER, FOR SOLUTION INTRAMUSCULAR; INTRAVENOUS at 10:08

## 2019-09-01 RX ADMIN — Medication 5 ML: at 05:43

## 2019-09-01 RX ADMIN — ACETAMINOPHEN 650 MG: 325 TABLET ORAL at 04:58

## 2019-09-01 ASSESSMENT — MIFFLIN-ST. JEOR: SCORE: 1263.49

## 2019-09-01 NOTE — PROGRESS NOTES
Patient A&O. VSS,RA. Denied pain. Incision CDI, Voiding. SBA with transfer. Passing gas. Port a cath de-accessed. Discharge instruction given to patient and daughter.  used. Verbalizes understanding of pain management, diet, activities, wound care and follow up appointments with surgery. Hard copies for discharge medication handed to daughter, because they were planning to get the medicine only if patient needed after trying optional pain management method. Patient was instructed to call oncology office to verify upcoming chemo infusion . Daughter agreed on this. Patient transferred to door #[phone] via WC. No further questions.

## 2019-09-01 NOTE — PLAN OF CARE
AVSS; pain controlled with tylenol, oxycodone and warm packs; up with SBA to the BR; voiding without difficulty; missing hat occasionally; portacath hep locked; pt tolerating clears; denies flatus; lap sites c/d/i.

## 2019-09-01 NOTE — PROGRESS NOTES
Surgery    S: No complaints.  Tolerating liquids this morning without issue.  Ambulating in hallway.    O: AF VSS ORA  Alert interactive pleasant  Abdomen non distended, band aids and steri strips intact over incisions without drainage    A/P:  64F s/p lap farhad for acute cholecystitis with Dr. Billy 8/31.  Doing well.    -- Appropriate for discharge later this morning.  -- Discharge orders and instructions placed.  Script available to fill if needed on chart-- oxycodone senna.    Jennifer Valladares MD  PGY4 General Surgery Resident

## 2019-09-01 NOTE — PLAN OF CARE
Pt alert ans oriented, vss offers no c/o of pain up in [place]olfe with sba.  Stab sites dry and intact.  Tolerating minimal liquids,hypo bowel sounds.

## 2019-09-01 NOTE — DISCHARGE INSTRUCTIONS
[facility]  Discharge Instructions: Post-Operative Laparoscopic Cholecystectomy    ACTIVITY    Expect to feel tired after your surgery.  This will gradually resolve.      Take frequent, short walks and increase your activity gradually.      Avoid strenuous physical activity or heavy lifting greater than 15-20 lbs. for 2-3 weeks.  You may climb stairs.    You may drive without restrictions when you are not using any prescription pain medication and feel comfortable in a car.    You may return to work/school when you are comfortable without any prescription pain medication.    WOUND CARE    You may remove your outer dressing or Band-Aids and shower 48 hours after the surgery.  Pat your incisions dry and leave them open to air.  Re-apply dressing (Band-Aids or gauze/tape) as needed for comfort or drainage.    You may have steri-strips (looks like white tape) on your incision.  You may peel off the steri-strips 2 weeks after your surgery if they have not peeled off on their own.     Do not soak your incisions in a tub or pool for 2 weeks.     Do not apply any lotions, creams, or ointments to your incisions.    A ridge under your incisions is normal and will gradually resolve.    DIET    Start with liquids, then gradually resume your regular diet as tolerated.  Avoid heavy, spicy, and greasy meals for 2-3 days.    Drink plenty of fluids to stay hydrated.    It is not uncommon to experience some loose stools or diarrhea after surgery.  This is your body s way of adapting to the bile which will slowly drain into your intestine.  A low fat diet may help with this.  This should improve over 1-2 months.    PAIN    Expect some tenderness and discomfort at the incision sites.  Use the prescribed pain medication at your discretion.  Expect gradual resolution of your pain over several days.    You may take ibuprofen with food (unless you have been told not to) instead of or in addition to  your prescribed pain medication.  If you are taking Norco or Percocet, do not take any additional acetaminophen/APAP/Tylenol.    Do not drink alcohol or drive while you are taking pain medications.    You may apply ice to your incisions in 20 minute intervals as needed for the next 48 hours.  After that time, consider switching to heat if you prefer.    EXPECTATIONS    Pain medications can cause constipation.  Limit use when possible.  Take over the counter stool softener/stimulant, such as Colace or Senna, 1-2 times a day with plenty of water.  You may take a mild over the counter laxative, such as Miralax or a suppository, as needed.  You may discontinue these medications once you are having regular bowel movements and/or are no longer taking your narcotic pain medication.      You may have shoulder or upper back discomfort due to the gas used in surgery.  This is temporary and should resolve in 48-72 hours.  Short, frequent walks may help with this.    FOLLOW UP    Our office will contact you approximately 2 weeks to check on your progress and answer any questions you may have.  If you are doing well, you will not need to return for a follow up appointment.  If any concerns are identified over the phone, we will help you make an appointment to see a provider.     If you have not received a phone call, have any questions or concerns, or would like to be seen, please call us at [phone] and ask to speak with our nurse.  We are located at [address] [place] [address].    CALL OUR OFFICE -[phone] IF YOU HAVE:     Chills or fever above 101 F.    Increased redness, warmth, or drainage at your incisions.    Significant bleeding.    Pain not relieved by your pain medication or rest.    Increasing pain after the first 48 hours.    Any other concerns or questions.    Revised January 2018                Discharge Instructions  Chest Pain    You have been seen today for chest pain or  discomfort.  At this time, your provider has found no signs that your chest pain is due to a serious or life-threatening condition, (or you have declined more testing and/or admission to the hospital). However, sometimes there is a serious problem that does not show up right away. Your evaluation today may not be complete and you may need further testing and evaluation.     Generally, every Emergency Department visit should have a follow-up clinic visit with either a primary or a specialty clinic/provider. Please follow-up as instructed by your emergency provider today.  Return to the Emergency Department if:    Your chest pain changes, gets worse, starts to happen more often, or comes with less activity.    You are newly short of breath.    You get very weak or tired.    You pass out or faint.    You have any new symptoms, like fever, cough, numb legs, or you cough up blood.    You have anything else that worries you.    Until you follow-up with your regular provider, please do the following:    Take one aspirin daily unless you have an allergy or are told not to by your provider.    If a stress test appointment has been made, go to the appointment.    If you have questions, contact your regular provider.    Follow-up with your regular provider/clinic as directed; this is very important.    If you were given a prescription for medicine here today, be sure to read all of the information (including the package insert) that comes with your prescription.  This will include important information about the medicine, its side effects, and any warnings that you need to know about.  The pharmacist who fills the prescription can provide more information and answer questions you may have about the medicine.  If you have questions or concerns that the pharmacist cannot address, please call or return to the Emergency Department.       Remember that you can always come back to the Emergency Department if you are not able to see  your regular provider in the amount of time listed above, if you get any new symptoms, or if there is anything that worries you.

## 2019-09-01 NOTE — DISCHARGE SUMMARY
[facility]  Hospitalist Discharge Summary       Date of Admission:  8/30/2019  Date of Discharge:  9/1/2019  Discharging Provider: Jaime Merchant MD  Discharge Diagnoses     Acute cholecystitis status post laparoscopic cholecystectomy    Hypokalemia  History of    Stage IV breast cancer    Anemia    Follow-ups Needed After Discharge   Follow-up Appointments     Follow-up and recommended labs and tests       Follow up with Dr. Billy in 2 weeks in surgery clinic if needed.  CALL OUR SURGERY OFFICE [phone] IF you need to schedule an   appointment or if issues arise.             Unresulted Labs Ordered in the Past 30 Days of this Admission     No orders found from 7/31/2019 to 8/31/2019.          Discharge Disposition   Discharged to home  Condition at discharge: Stable    Hospital Course   Hoda Brush is a 64 year old female with metastatic breast cancer.  She presented to the emergency room with right upper quadrant and epigastric area abdominal pain and vomiting.  CT of the abdomen pelvis did show possible cholecystitis.  Ultrasound showed multiple nonmobile gallstones and mild gallbladder wall thickening.  There is no sonographic Motta's sign.  The common duct appeared to be mildly dilated.  White blood cell count was 14,000, LFTs were normal.  The patient was seen by the general surgeon and underwent laparoscopic cholecystectomy.  She had an uncomplicated postoperative course.  She is tolerating a regular diet and discharged home.    Consultations This Hospital Stay   SURGERY GENERAL IP CONSULT    Code Status   Full Code    Time Spent on this Encounter   I, Jaime Merchant MD, personally saw the patient today and spent less than or equal to 30 minutes discharging this patient.       Jaime Merchant MD  [facility]  ______________________________________________________________________    Physical Exam   Vital Signs: Temp: 97.9  F (36.6  C)  Temp src: Oral BP: 102/52 Pulse: 81 Heart Rate: 74 Resp: 16 SpO2: 94 % O2 Device: None (Room air) Oxygen Delivery: 1 LPM  Weight: 166 lbs 8 oz  Constitutional: awake, alert, cooperative, no apparent distress, and appears stated age  GI: normal bowel sounds, soft, non-distended, non-tender       Primary Care Physician   Physician No Ref-Primary    Discharge Orders      Reason for your hospital stay    You were here for infection of your gall bladder.  You had surgery to remove the gallbladder.     Follow-up and recommended labs and tests     Follow up with Dr. Billy in 2 weeks in surgery clinic if needed.  CALL OUR SURGERY OFFICE [phone] IF you need to schedule an appointment or if issues arise.     Activity    Your activity upon discharge: activity as tolerated     When to contact your care team    CALL OUR SURGERY OFFICE [phone] IF YOU HAVE:   Chills or fever above 101 F.  Increased redness, warmth, or drainage at your incisions.  Significant bleeding.  Pain not relieved by your pain medication or rest.  Increasing pain after the first 48 hours.  Any other concerns or questions.     Wound care and dressings    Instructions to care for your wound at home:   Ok to remove outer dressings and shower starting 1 day post procedure.  Leave steri strips and/or glue in place until they fall off, or remove them after 14 days.  Ok to let water/mild soap run over the incisions, but no soaking/submerging or scrubbing the sites.  Pat them dry.  Replace overlying dressings if needed for any drainage.  Return to clinic for incision check at your follow up appointment.     Full Code     Diet    Follow this diet upon discharge: Orders Placed This Encounter      Advance Diet as Tolerated: Clear Liquid Diet; Regular Diet Adult       Significant Results and Procedures   Most Recent 3 CBC's:  Recent Labs   Lab Test 08/31/19  0700 08/30/19  2346 08/13/19  1208   WBC 10.6 13.9* 7.7   HGB 10.6* 11.2* 11.7   MCV 92 93 95     260 211     Most Recent 3 BMP's:  Recent Labs   Lab Test 08/31/19  0700 08/30/19  2346 08/13/19  1208    142 141   POTASSIUM 4.0 3.2* 4.1   CHLORIDE 110* 107 110*   CO2 27 24 27   BUN 12 19 14   CR 0.69 0.73 0.80   ANIONGAP 6 11 4   TAYLER 8.4* 8.3* 8.5   * 130* 78     Most Recent 2 LFT's:  Recent Labs   Lab Test 08/31/19  0700 08/30/19  2346   * 27   * 23   ALKPHOS 104 61   BILITOTAL 1.0 0.4   ,   Results for orders placed or performed during the hospital encounter of 08/30/19   CT Chest/Abdomen/Pelvis w Contrast    Narrative    CT CHEST/ABDOMEN/PELVIS W CONTRAST 8/31/2019 1:27 AM     HISTORY: Chest pain radiating to back; breast cancer with metastasis  to spine.    TECHNIQUE: Volumetric acquisition through chest, abdomen and pelvis  with IV contrast.   81mL Isovue-370  Radiation dose for this scan was  reduced using automated exposure control, adjustment of the mA and/or  kV according to patient size, or iterative reconstruction technique.    COMPARISON: 7/22/2019.    FINDINGS:   Chest: No acute infiltrates or pleural effusions. Normal heart size.  No pathologic adenopathy. Left mastectomy. Right chest wall port with  catheter tip in the SVC. An area of focal linear atelectasis obscures  the previously seen 0.4 cm nodule in the right middle lobe, probably  stable (series 6, image 202). The 0.6 cm nodule in the left lower lobe  (series 6, image 209) is also stable.    Abdomen and pelvis: Mild hazy stranding about the gallbladder  extending adjacent to the duodenum. The gallbladder is not distended.  No calcified gallstones. Liver, pancreas, spleen, adrenal glands and  kidneys demonstrate no worrisome findings.    Uterine fibroids. No suspicious adnexal masses. No bowel obstruction,  ascites or free air.    Numerous metastatic lesions which are predominantly sclerotic in the  thoracolumbar spine, pelvis and proximal right femur, similar to prior  study.      Impression     IMPRESSION:  1. Slight fat stranding/inflammation surrounding the gallbladder and  adjacent to the proximal duodenum. Differential includes  cholecystitis, duodenitis or peptic ulcer disease. Gallbladder is not  distended and no calcified gallstones are seen.  2. No other acute findings.  3. Stable osseous metastases and small pulmonary nodules.    CARL POOLE MD   US Abdomen Limited (RUQ)    Narrative    US ABDOMEN LIMITED 8/31/2019 2:43 AM    CLINICAL INFORMATION: Fat stranding around gallbladder on CT.    COMPARISON: CT 8/31/2019.    FINDINGS:  Limited right upper quadrant ultrasound demonstrates multiple  nonmobile gallstones and mild gallbladder wall thickening up to 0.6  cm. No sonographic Motta's sign was reported by the sonographer.  Common hepatic duct appears mildly dilated at 1.1 cm, though this is  not appreciated on the CT. Slight prominence of a few central  intrahepatic bile ducts . No focal liver lesion. Visualized portions  of the  pancreas are negative. The visualized portion of the right  kidney is unremarkable. No hydronephrosis on the right.      Impression    IMPRESSION:   1. Cholelithiasis with gallbladder wall thickening which may indicate  cholecystitis, however no sonographic Motta's sign was noted.  Gallbladder is also not distended.  2. Slight prominence of a few central intrahepatic bile ducts and  equivocal dilatation of the common hepatic duct, not confirmed on CT.  3. No other acute findings.    CARL POOLE MD       Discharge Medications   Current Discharge Medication List      START taking these medications    Details   oxyCODONE (ROXICODONE) 5 MG tablet Take 1 tablet (5 mg) by mouth every 3 hours as needed for breakthrough pain or severe pain  Qty: 12 tablet, Refills: 0    Associated Diagnoses: Acute cholecystitis      SENNA-docusate sodium (SENNA S) 8.6-50 MG tablet Take 1 tablet by mouth At Bedtime Take while taking oxycodone to prevent constipation.  Qty: 24 tablet,  Refills: 0    Associated Diagnoses: Acute cholecystitis         CONTINUE these medications which have NOT CHANGED    Details   acetaminophen (TYLENOL) 500 MG tablet Take 1,000 mg by mouth every 6 hours as needed for mild pain      ibuprofen (ADVIL/MOTRIN) 600 MG tablet Take 600 mg by mouth every 6 hours as needed for moderate pain      melatonin 3 MG tablet Take 1 tablet (3 mg) by mouth nightly as needed for sleep  Qty: 30 tablet, Refills: 11    Associated Diagnoses: Primary insomnia      tamoxifen (NOLVADEX) 20 MG tablet Take 1 tablet (20 mg) by mouth daily  Qty: 90 tablet, Refills: 3    Associated Diagnoses: Cancer of central portion of left breast (H)      VITAMIN D, CHOLECALCIFEROL, PO Take 1,000 Units by mouth daily      !! order for DME Equipment being ordered: 2 Mastectomy bras and 1 prosthetheses.  Qty: 2 Piece, Refills: 0    Associated Diagnoses: Malignant neoplasm of left female breast, unspecified estrogen receptor status, unspecified site of breast (H)      !! order for DME L UE class 2 compression sleeve and gauntlet  Night garment  Bandaging supplies  Qty: 1 each, Refills: 1    Associated Diagnoses: Lymphedema of left upper extremity       !! - Potential duplicate medications found. Please discuss with provider.        Allergies   No Known Allergies

## 2019-09-02 RX ORDER — ALBUTEROL SULFATE 90 UG/1
1-2 AEROSOL, METERED RESPIRATORY (INHALATION)
Status: CANCELLED
Start: 2019-09-03

## 2019-09-02 RX ORDER — EPINEPHRINE 0.3 MG/.3ML
0.3 INJECTION SUBCUTANEOUS EVERY 5 MIN PRN
Status: CANCELLED | OUTPATIENT
Start: 2019-09-03

## 2019-09-02 RX ORDER — MEPERIDINE HYDROCHLORIDE 25 MG/ML
25 INJECTION INTRAMUSCULAR; INTRAVENOUS; SUBCUTANEOUS EVERY 30 MIN PRN
Status: CANCELLED | OUTPATIENT
Start: 2019-09-03

## 2019-09-02 RX ORDER — LORAZEPAM 2 MG/ML
0.5 INJECTION INTRAMUSCULAR EVERY 4 HOURS PRN
Status: CANCELLED
Start: 2019-09-03

## 2019-09-02 RX ORDER — HEPARIN SODIUM (PORCINE) LOCK FLUSH IV SOLN 100 UNIT/ML 100 UNIT/ML
5 SOLUTION INTRAVENOUS EVERY 8 HOURS
Status: CANCELLED | OUTPATIENT
Start: 2019-09-03

## 2019-09-02 RX ORDER — SODIUM CHLORIDE 9 MG/ML
1000 INJECTION, SOLUTION INTRAVENOUS CONTINUOUS PRN
Status: CANCELLED
Start: 2019-09-03

## 2019-09-02 RX ORDER — METHYLPREDNISOLONE SODIUM SUCCINATE 125 MG/2ML
125 INJECTION, POWDER, LYOPHILIZED, FOR SOLUTION INTRAMUSCULAR; INTRAVENOUS
Status: CANCELLED
Start: 2019-09-03

## 2019-09-02 RX ORDER — EPINEPHRINE 1 MG/ML
0.3 INJECTION, SOLUTION INTRAMUSCULAR; SUBCUTANEOUS EVERY 5 MIN PRN
Status: CANCELLED | OUTPATIENT
Start: 2019-09-03

## 2019-09-02 RX ORDER — ALBUTEROL SULFATE 0.83 MG/ML
2.5 SOLUTION RESPIRATORY (INHALATION)
Status: CANCELLED | OUTPATIENT
Start: 2019-09-03

## 2019-09-02 RX ORDER — DIPHENHYDRAMINE HYDROCHLORIDE 50 MG/ML
50 INJECTION INTRAMUSCULAR; INTRAVENOUS
Status: CANCELLED
Start: 2019-09-03

## 2019-09-02 RX ORDER — DIPHENHYDRAMINE HCL 25 MG
50 CAPSULE ORAL ONCE
Status: CANCELLED
Start: 2019-09-03

## 2019-09-02 RX ORDER — ACETAMINOPHEN 325 MG/1
650 TABLET ORAL
Status: CANCELLED | OUTPATIENT
Start: 2019-09-03

## 2019-09-03 ENCOUNTER — PATIENT OUTREACH (OUTPATIENT)
Dept: ONCOLOGY | Facility: CLINIC | Age: 64
End: 2019-09-03

## 2019-09-03 NOTE — PROGRESS NOTES
Spoke to patient's daughter Laura [phone] and will cancel echocardiogram, labs and appointment with Dr Aguiar today with being POD 2 from cholecystectomy per Jossie Sparrow's recommendations. Message sent to scheduling to re-schedule above appointments and the future Herceptin as had been previously scheduled. Laura is on patient's MyChart to watch for re-scheduling of appointments as recommended.  Answered all patient's daughter's questions and verbalized understanding. Cortney Ramos RN, BSN.

## 2019-09-04 LAB — COPATH REPORT: NORMAL

## 2019-09-13 ENCOUNTER — INFUSION THERAPY VISIT (OUTPATIENT)
Dept: ONCOLOGY | Facility: CLINIC | Age: 64
End: 2019-09-13
Attending: INTERNAL MEDICINE
Payer: COMMERCIAL

## 2019-09-13 ENCOUNTER — HOSPITAL ENCOUNTER (OUTPATIENT)
Dept: CARDIOLOGY | Facility: CLINIC | Age: 64
Discharge: HOME | End: 2019-09-13
Attending: INTERNAL MEDICINE | Admitting: INTERNAL MEDICINE
Payer: COMMERCIAL

## 2019-09-13 ENCOUNTER — ONCOLOGY VISIT (OUTPATIENT)
Dept: ONCOLOGY | Facility: CLINIC | Age: 64
End: 2019-09-13
Attending: PHYSICIAN ASSISTANT
Payer: COMMERCIAL

## 2019-09-13 ENCOUNTER — APPOINTMENT (OUTPATIENT)
Dept: LAB | Facility: CLINIC | Age: 64
End: 2019-09-13
Attending: INTERNAL MEDICINE
Payer: COMMERCIAL

## 2019-09-13 VITALS
BODY MASS INDEX: 29.26 KG/M2 | TEMPERATURE: 98.3 F | SYSTOLIC BLOOD PRESSURE: 104 MMHG | WEIGHT: 160 LBS | HEART RATE: 92 BPM | DIASTOLIC BLOOD PRESSURE: 64 MMHG | RESPIRATION RATE: 18 BRPM | OXYGEN SATURATION: 98 %

## 2019-09-13 DIAGNOSIS — C50.912 MALIGNANT NEOPLASM OF LEFT FEMALE BREAST, UNSPECIFIED ESTROGEN RECEPTOR STATUS, UNSPECIFIED SITE OF BREAST (H): Primary | ICD-10-CM

## 2019-09-13 DIAGNOSIS — C50.912 MALIGNANT NEOPLASM OF LEFT FEMALE BREAST, UNSPECIFIED ESTROGEN RECEPTOR STATUS, UNSPECIFIED SITE OF BREAST (H): ICD-10-CM

## 2019-09-13 DIAGNOSIS — C79.51 BONE METASTASIS: ICD-10-CM

## 2019-09-13 DIAGNOSIS — Z51.11 ENCOUNTER FOR ANTINEOPLASTIC CHEMOTHERAPY: ICD-10-CM

## 2019-09-13 LAB
ALBUMIN SERPL-MCNC: 3.2 G/DL (ref 3.4–5)
ALP SERPL-CCNC: 96 U/L (ref 40–150)
ALT SERPL W P-5'-P-CCNC: 54 U/L (ref 0–50)
ANION GAP SERPL CALCULATED.3IONS-SCNC: 10 MMOL/L (ref 3–14)
AST SERPL W P-5'-P-CCNC: 27 U/L (ref 0–45)
BASOPHILS # BLD AUTO: 0 10E9/L (ref 0–0.2)
BASOPHILS NFR BLD AUTO: 0.4 %
BILIRUB SERPL-MCNC: 0.2 MG/DL (ref 0.2–1.3)
BUN SERPL-MCNC: 15 MG/DL (ref 7–30)
CALCIUM SERPL-MCNC: 8.8 MG/DL (ref 8.5–10.1)
CANCER AG27-29 SERPL-ACNC: 13 U/ML (ref 0–39)
CEA SERPL-MCNC: <0.5 UG/L (ref 0–2.5)
CHLORIDE SERPL-SCNC: 108 MMOL/L (ref 94–109)
CO2 SERPL-SCNC: 25 MMOL/L (ref 20–32)
CREAT SERPL-MCNC: 0.73 MG/DL (ref 0.52–1.04)
DIFFERENTIAL METHOD BLD: ABNORMAL
EOSINOPHIL # BLD AUTO: 0.2 10E9/L (ref 0–0.7)
EOSINOPHIL NFR BLD AUTO: 2.8 %
ERYTHROCYTE [DISTWIDTH] IN BLOOD BY AUTOMATED COUNT: 13 % (ref 10–15)
GFR SERPL CREATININE-BSD FRML MDRD: 87 ML/MIN/{1.73_M2}
GLUCOSE SERPL-MCNC: 133 MG/DL (ref 70–99)
HCT VFR BLD AUTO: 35.1 % (ref 35–47)
HGB BLD-MCNC: 11 G/DL (ref 11.7–15.7)
IMM GRANULOCYTES # BLD: 0 10E9/L (ref 0–0.4)
IMM GRANULOCYTES NFR BLD: 0.1 %
LYMPHOCYTES # BLD AUTO: 2.4 10E9/L (ref 0.8–5.3)
LYMPHOCYTES NFR BLD AUTO: 35.2 %
MCH RBC QN AUTO: 29.3 PG (ref 26.5–33)
MCHC RBC AUTO-ENTMCNC: 31.3 G/DL (ref 31.5–36.5)
MCV RBC AUTO: 94 FL (ref 78–100)
MONOCYTES # BLD AUTO: 0.4 10E9/L (ref 0–1.3)
MONOCYTES NFR BLD AUTO: 5.7 %
NEUTROPHILS # BLD AUTO: 3.8 10E9/L (ref 1.6–8.3)
NEUTROPHILS NFR BLD AUTO: 55.8 %
NRBC # BLD AUTO: 0 10*3/UL
NRBC BLD AUTO-RTO: 0 /100
PLATELET # BLD AUTO: 332 10E9/L (ref 150–450)
POTASSIUM SERPL-SCNC: 3.9 MMOL/L (ref 3.4–5.3)
PROT SERPL-MCNC: 7.7 G/DL (ref 6.8–8.8)
RBC # BLD AUTO: 3.75 10E12/L (ref 3.8–5.2)
SODIUM SERPL-SCNC: 143 MMOL/L (ref 133–144)
WBC # BLD AUTO: 6.9 10E9/L (ref 4–11)

## 2019-09-13 PROCEDURE — 82378 CARCINOEMBRYONIC ANTIGEN: CPT | Performed by: INTERNAL MEDICINE

## 2019-09-13 PROCEDURE — 86300 IMMUNOASSAY TUMOR CA 15-3: CPT | Performed by: INTERNAL MEDICINE

## 2019-09-13 PROCEDURE — 85025 COMPLETE CBC W/AUTO DIFF WBC: CPT | Performed by: INTERNAL MEDICINE

## 2019-09-13 PROCEDURE — 93306 TTE W/DOPPLER COMPLETE: CPT | Mod: 26 | Performed by: INTERNAL MEDICINE

## 2019-09-13 PROCEDURE — 96413 CHEMO IV INFUSION 1 HR: CPT

## 2019-09-13 PROCEDURE — 25000128 H RX IP 250 OP 636: Mod: ZF | Performed by: PHYSICIAN ASSISTANT

## 2019-09-13 PROCEDURE — 25800030 ZZH RX IP 258 OP 636: Mod: ZF | Performed by: PHYSICIAN ASSISTANT

## 2019-09-13 PROCEDURE — 96372 THER/PROPH/DIAG INJ SC/IM: CPT | Mod: 59

## 2019-09-13 PROCEDURE — 99214 OFFICE O/P EST MOD 30 MIN: CPT | Mod: ZP | Performed by: PHYSICIAN ASSISTANT

## 2019-09-13 PROCEDURE — 93306 TTE W/DOPPLER COMPLETE: CPT

## 2019-09-13 PROCEDURE — 80053 COMPREHEN METABOLIC PANEL: CPT | Performed by: INTERNAL MEDICINE

## 2019-09-13 RX ORDER — HEPARIN SODIUM (PORCINE) LOCK FLUSH IV SOLN 100 UNIT/ML 100 UNIT/ML
5 SOLUTION INTRAVENOUS EVERY 8 HOURS
Status: DISCONTINUED | OUTPATIENT
Start: 2019-09-13 | End: 2019-09-13 | Stop reason: HOSPADM

## 2019-09-13 RX ORDER — DIPHENHYDRAMINE HYDROCHLORIDE 50 MG/ML
50 INJECTION INTRAMUSCULAR; INTRAVENOUS
Status: CANCELLED
Start: 2019-09-13

## 2019-09-13 RX ORDER — MEPERIDINE HYDROCHLORIDE 25 MG/ML
25 INJECTION INTRAMUSCULAR; INTRAVENOUS; SUBCUTANEOUS EVERY 30 MIN PRN
Status: CANCELLED | OUTPATIENT
Start: 2019-09-13

## 2019-09-13 RX ORDER — ACETAMINOPHEN 325 MG/1
650 TABLET ORAL
Status: CANCELLED | OUTPATIENT
Start: 2019-09-13

## 2019-09-13 RX ORDER — ALBUTEROL SULFATE 0.83 MG/ML
2.5 SOLUTION RESPIRATORY (INHALATION)
Status: CANCELLED | OUTPATIENT
Start: 2019-09-13

## 2019-09-13 RX ORDER — METHYLPREDNISOLONE SODIUM SUCCINATE 125 MG/2ML
125 INJECTION, POWDER, LYOPHILIZED, FOR SOLUTION INTRAMUSCULAR; INTRAVENOUS
Status: CANCELLED
Start: 2019-09-13

## 2019-09-13 RX ORDER — HEPARIN SODIUM (PORCINE) LOCK FLUSH IV SOLN 100 UNIT/ML 100 UNIT/ML
5 SOLUTION INTRAVENOUS EVERY 8 HOURS
Status: CANCELLED | OUTPATIENT
Start: 2019-09-13

## 2019-09-13 RX ORDER — ALBUTEROL SULFATE 90 UG/1
1-2 AEROSOL, METERED RESPIRATORY (INHALATION)
Status: CANCELLED
Start: 2019-09-13

## 2019-09-13 RX ORDER — EPINEPHRINE 1 MG/ML
0.3 INJECTION, SOLUTION INTRAMUSCULAR; SUBCUTANEOUS EVERY 5 MIN PRN
Status: CANCELLED | OUTPATIENT
Start: 2019-09-13

## 2019-09-13 RX ORDER — SODIUM CHLORIDE 9 MG/ML
1000 INJECTION, SOLUTION INTRAVENOUS CONTINUOUS PRN
Status: CANCELLED
Start: 2019-09-13

## 2019-09-13 RX ORDER — LORAZEPAM 2 MG/ML
0.5 INJECTION INTRAMUSCULAR EVERY 4 HOURS PRN
Status: CANCELLED
Start: 2019-09-13

## 2019-09-13 RX ORDER — EPINEPHRINE 0.3 MG/.3ML
0.3 INJECTION SUBCUTANEOUS EVERY 5 MIN PRN
Status: CANCELLED | OUTPATIENT
Start: 2019-09-13

## 2019-09-13 RX ORDER — DIPHENHYDRAMINE HCL 25 MG
50 CAPSULE ORAL ONCE
Status: CANCELLED
Start: 2019-09-13

## 2019-09-13 RX ADMIN — SODIUM CHLORIDE 250 ML: 9 INJECTION, SOLUTION INTRAVENOUS at 12:43

## 2019-09-13 RX ADMIN — TRASTUZUMAB 600 MG: 150 INJECTION, POWDER, LYOPHILIZED, FOR SOLUTION INTRAVENOUS at 12:43

## 2019-09-13 RX ADMIN — DENOSUMAB 120 MG: 120 INJECTION SUBCUTANEOUS at 13:38

## 2019-09-13 RX ADMIN — HEPARIN SODIUM (PORCINE) LOCK FLUSH IV SOLN 100 UNIT/ML 5 ML: 100 SOLUTION at 14:18

## 2019-09-13 RX ADMIN — HEPARIN 5 ML: 100 SYRINGE at 10:45

## 2019-09-13 ASSESSMENT — PAIN SCALES - GENERAL: PAINLEVEL: NO PAIN (0)

## 2019-09-13 NOTE — PROGRESS NOTES
Oncology/Hematology Visit Note  Sep 13, 2019    Reason for Visit: follow up of ER positive, HER2 positive left metastatic breast cancer    History of Present Illness: Hoda Brush is a 64 year old female with ER positive, HER2 positive left metastatic breast cancer with bone mets.     TREATMENT HISTORY:  A. Initial diagnosis with metastatic breast cancer in [facility].  Neoadjuvant CAF x 6.   B. Left mastectomy. Left axillary node dissection.  C.  Radiation in 1 dose to R iliac region. g Gy.  C. Herceptin for 2 years taxane for a prescribed course then stopped, monthly zoledronic acid.  She had 2 years of Herceptin with tamoxifen added after chemotherapy.  D.  Tamoxifen alone and zoledronic acid every 3 months.  E.  [place]  We restarted Herceptin every 3 weeks and continued tamoxifen. Bone targeted agent changed to denosumab every 6 weeks.      She is from [facility], formerly from [facility], and came [place] live in the [place]  She lives with her daughter and is here for continuation of every 3 week Herceptin, which she receives along with tamoxifen.  Her tumor is ER positive, HI positive, HER-2 positive.  She had metastatic disease at the time of presentation with bone-only metastases by report.  She presented with metastatic disease in 02/2014.  Staging was initially bone-only metastases by report.  She did her staging in 02/2014 that showed that she had stage IV disease, [id number] invasive ductal carcinoma of the left breast.  She had a right hip metastasis.  She underwent neoadjuvant CAF, left mastectomy, left axillary lymph node dissection and radiation.  She had radiation of the right iliac region where she had metastatic disease.  Pathology report from [facility] shows the tumor to be positive for ER in 75% of the cells, positive for HI in 40% of the cells, and the HER-2 was 3+ positive by immunohistochemistry.  The Ki-67 was 20%.  She had no evidence of nonbone metastatic disease on her PET/CT scan from  08/2017.    Restaging on 7/22/19 was stable.      Had acute abdominal pain and N/V and found to have cholecystitis in the ER. Had cholecystectomy on 8/31.    Interval History:  Hoda presents today with interpretter prior to treatment and for hospital f/u.     She has been doing well since her surgery. No further N/V. No stools changes. Denies any pain. No f/c. Appetite is good though she feels like she is filling up faster. Otherwise no new or different symptoms to report.     ROS: 10 point ROS neg other than the symptoms noted above in the HPI.      Current Outpatient Medications   Medication Sig Dispense Refill     melatonin 3 MG tablet Take 1 tablet (3 mg) by mouth nightly as needed for sleep 30 tablet 11     tamoxifen (NOLVADEX) 20 MG tablet Take 1 tablet (20 mg) by mouth daily 90 tablet 3     VITAMIN D, CHOLECALCIFEROL, PO Take 1,000 Units by mouth daily       acetaminophen (TYLENOL) 500 MG tablet Take 1,000 mg by mouth every 6 hours as needed for mild pain       ibuprofen (ADVIL/MOTRIN) 600 MG tablet Take 600 mg by mouth every 6 hours as needed for moderate pain       order for DME Equipment being ordered: 2 Mastectomy bras and 1 prosthetheses. (Patient not taking: Reported on 9/13/2019) 2 Piece 0     order for DME L UE class 2 compression sleeve and gauntlet  Night garment  Bandaging supplies (Patient not taking: Reported on 9/13/2019) 1 each 1     oxyCODONE (ROXICODONE) 5 MG tablet Take 1 tablet (5 mg) by mouth every 3 hours as needed for breakthrough pain or severe pain (Patient not taking: Reported on 9/13/2019) 12 tablet 0     SENNA-docusate sodium (SENNA S) 8.6-50 MG tablet Take 1 tablet by mouth At Bedtime Take while taking oxycodone to prevent constipation. (Patient not taking: Reported on 9/13/2019) 24 tablet 0       Physical Examination:  General: The patient is a pleasant female in no acute distress.  /64 (BP Location: Right arm, Patient Position: Sitting, Cuff Size: Adult Regular)   Pulse  92   Temp 98.3  F (36.8  C) (Oral)   Resp 18   Wt 72.6 kg (160 lb)   SpO2 98%   BMI 29.26 kg/m    Wt Readings from Last 10 Encounters:   09/13/19 72.6 kg (160 lb)   09/01/19 75.5 kg (166 lb 8 oz)   08/28/19 75.8 kg (167 lb)   08/13/19 76.7 kg (169 lb 1.6 oz)   07/23/19 77.5 kg (170 lb 12.8 oz)   07/02/19 76.7 kg (169 lb 3.2 oz)   06/11/19 78.9 kg (173 lb 14.4 oz)   05/28/19 80.3 kg (177 lb)   05/21/19 80.3 kg (177 lb)   04/30/19 79.2 kg (174 lb 9.6 oz)     HEENT: EOMI, PERRL. Sclerae are anicteric. Oral mucosa is pink and moist with no lesions or thrush.   Lymph: No palpable cervical, supraclavicular, subclavicular or axillary lymphadenopathy.   Heart: Regular rate and rhythm.   Lungs: Clear to auscultation bilaterally.   Abdomen: Bowel sounds present, soft, nontender with no palpable hepatosplenomegaly or masses.   Extremities: No lower extremity edema noted bilaterally.   Neuro: Cranial nerves II through XII are grossly intact.  Skin: No rashes, petechiae, or bruising noted on exposed skin.    Laboratory Data:      ECHO from today  Interpretation Summary  Normal left ventricular systolic function. LVEF 57% based on volumetric 3D  analysis.  Left ventricular diastolic function is normal.  Global peak LV longitudinal strain is averaged at -18.8%. This is within  reported normal limits (normal <-18%).     No significant change compared to prior study.    Assessment and Plan:    1. Metastatic breast cancer, ER, OR, HER2+ positive:   Was last treated in [facility] with Herceptin. We have continued Herceptin every 3 weeks as well as daily tamoxifen. CT CAP on 7/22/19 with stable disease. Echocardiogram repeated today with EF 57% and normal LV function; no significant change to prior.   -did have delay of treatment from last week due to recent surgery  -she is doing well today and back at baseline. Will proceed with herceptin today though will reload her dose (8 mg/m2) since it has been over a week   -Continue  Tamoxifen. Will discuss switching to letrozole at next appt with Dr. Aguiar or Jossie barnett/jeanette in 3 weeks prior to next infusion     2. Bone metastasis: On xgeva every 6 weeks. On calcium + vitamin D. Calcium WNL. Cleared by dentist to proceed. Last dose 7/2/19. Due today. Next dose due 10/25      3. LUE lymphedema: Has been to lymphedema therapy and has compression sleeve. No recent issues.     4. Acute cholecystitis s/p laparoscopic cholecystectomy  -presented to ED with RUQ and epigastric pain, and N/V. Found to have gallbladder wall thickening, dilation of common duct and elevated WBC. S/p surgery on 8/31. Now doing well and back to baseline besides some early satiety. Continue to advance diet as possible. Albumin WNL and improved    Caryn Wheeler PA-C  [facility]  [address] [place] [address] [phone]

## 2019-09-13 NOTE — PROGRESS NOTES
Infusion Nursing Note:  Hoda Brush presents today for Cycle 35 Day 1 Herceptin (Re-load).    Patient seen by provider today: Yes: Caryn CHA   present during visit today:  Yes.  [place].    Note: Today's Herceptin dose is re-load.  Per Caryn CHA - Ok to give without premeds.    Due for Xgeva.    Intravenous Access:  Implanted Port.    Treatment Conditions:  Lab Results   Component Value Date    HGB 11.0 09/13/2019     Lab Results   Component Value Date    WBC 6.9 09/13/2019      Lab Results   Component Value Date    ANEU 3.8 09/13/2019     Lab Results   Component Value Date     09/13/2019      Lab Results   Component Value Date     09/13/2019                   Lab Results   Component Value Date    POTASSIUM 3.9 09/13/2019           No results found for: MAG         Lab Results   Component Value Date    CR 0.73 09/13/2019                   Lab Results   Component Value Date    TAYLER 8.8 09/13/2019                Lab Results   Component Value Date    BILITOTAL 0.2 09/13/2019           Lab Results   Component Value Date    ALBUMIN 3.2 09/13/2019                    Lab Results   Component Value Date    ALT 54 09/13/2019           Lab Results   Component Value Date    AST 27 09/13/2019       Results reviewed, labs MET treatment parameters, ok to proceed with treatment.  ECHO/MUGA completed today  EF 57%.      Post Infusion Assessment:  Patient tolerated infusion without incident.  Tolerated subcutaneous Xgeva to right arm without incident.  Blood return noted pre and post infusion.  Site patent and intact, free from redness, edema or discomfort.  No evidence of extravasations.  Access discontinued per protocol.       Discharge Plan:   Patient declined prescription refills.  AVS to patient via [employer].  Patient will return 10/1/19 labs/PA/infusion for next appointment.   Patient discharged in stable condition accompanied by: .  Departure Mode: Ambulatory.  Face to  Face time: 0.    Marry Diamond RN

## 2019-09-13 NOTE — NURSING NOTE
Chief Complaint   Patient presents with     Port Draw     Labs drawn via PORT by RN in lab. Line flushed with saline and heparin. VS taken.      Karina Gant RN

## 2019-09-13 NOTE — LETTER
RE: Hoda Brush  [address] [facility] [address]       Oncology/Hematology Visit Note  Sep 13, 2019    Reason for Visit: follow up of ER positive, HER2 positive left metastatic breast cancer    History of Present Illness: Hoda Brush is a 64 year old female with ER positive, HER2 positive left metastatic breast cancer with bone mets.     TREATMENT HISTORY:  A. Initial diagnosis with metastatic breast cancer in [facility].  Neoadjuvant CAF x 6.   B. Left mastectomy. Left axillary node dissection.  C.  Radiation in 1 dose to R iliac region. g Gy.  C. Herceptin for 2 years taxane for a prescribed course then stopped, monthly zoledronic acid.  She had 2 years of Herceptin with tamoxifen added after chemotherapy.  D.  Tamoxifen alone and zoledronic acid every 3 months.  E.  [place]  We restarted Herceptin every 3 weeks and continued tamoxifen. Bone targeted agent changed to denosumab every 6 weeks.      She is from [facility], formerly from [facility], and came [place] the [place]  She lives with her daughter and is here for continuation of every 3 week Herceptin, which she receives along with tamoxifen.  Her tumor is ER positive, WA positive, HER-2 positive.  She had metastatic disease at the time of presentation with bone-only metastases by report.  She presented with metastatic disease in 02/2014.  Staging was initially bone-only metastases by report.  She did her staging in 02/2014 that showed that she had stage IV disease, T4N2M1 invasive ductal carcinoma of the left breast.  She had a right hip metastasis.  She underwent neoadjuvant CAF, left mastectomy, left axillary lymph node dissection and radiation.  She had radiation of the right iliac region where she had metastatic disease.  Pathology report from [facility] shows the tumor to be positive for ER in 75% of the cells, positive for WA in 40% of the cells, and the HER-2 was 3+ positive by immunohistochemistry.  The Ki-67 was 20%.   She had no evidence of nonbone metastatic disease on her PET/CT scan from 08/2017.    Restaging on 7/22/19 was stable.      Had acute abdominal pain and N/V and found to have cholecystitis in the ER. Had cholecystectomy on 8/31.    Interval History:  Hoda presents today with interpretter prior to treatment and for hospital f/u.     She has been doing well since her surgery. No further N/V. No stools changes. Denies any pain. No f/c. Appetite is good though she feels like she is filling up faster. Otherwise no new or different symptoms to report.     ROS: 10 point ROS neg other than the symptoms noted above in the HPI.      Current Outpatient Medications   Medication Sig Dispense Refill     melatonin 3 MG tablet Take 1 tablet (3 mg) by mouth nightly as needed for sleep 30 tablet 11     tamoxifen (NOLVADEX) 20 MG tablet Take 1 tablet (20 mg) by mouth daily 90 tablet 3     VITAMIN D, CHOLECALCIFEROL, PO Take 1,000 Units by mouth daily       acetaminophen (TYLENOL) 500 MG tablet Take 1,000 mg by mouth every 6 hours as needed for mild pain       ibuprofen (ADVIL/MOTRIN) 600 MG tablet Take 600 mg by mouth every 6 hours as needed for moderate pain       order for DME Equipment being ordered: 2 Mastectomy bras and 1 prosthetheses. (Patient not taking: Reported on 9/13/2019) 2 Piece 0     order for DME L UE class 2 compression sleeve and gauntlet  Night garment  Bandaging supplies (Patient not taking: Reported on 9/13/2019) 1 each 1     oxyCODONE (ROXICODONE) 5 MG tablet Take 1 tablet (5 mg) by mouth every 3 hours as needed for breakthrough pain or severe pain (Patient not taking: Reported on 9/13/2019) 12 tablet 0     SENNA-docusate sodium (SENNA S) 8.6-50 MG tablet Take 1 tablet by mouth At Bedtime Take while taking oxycodone to prevent constipation. (Patient not taking: Reported on 9/13/2019) 24 tablet 0       Physical Examination:  General: The patient is a pleasant female in no acute distress.  /64 (BP Location:  Right arm, Patient Position: Sitting, Cuff Size: Adult Regular)   Pulse 92   Temp 98.3  F (36.8  C) (Oral)   Resp 18   Wt 72.6 kg (160 lb)   SpO2 98%   BMI 29.26 kg/m     Wt Readings from Last 10 Encounters:   09/13/19 72.6 kg (160 lb)   09/01/19 75.5 kg (166 lb 8 oz)   08/28/19 75.8 kg (167 lb)   08/13/19 76.7 kg (169 lb 1.6 oz)   07/23/19 77.5 kg (170 lb 12.8 oz)   07/02/19 76.7 kg (169 lb 3.2 oz)   06/11/19 78.9 kg (173 lb 14.4 oz)   05/28/19 80.3 kg (177 lb)   05/21/19 80.3 kg (177 lb)   04/30/19 79.2 kg (174 lb 9.6 oz)     HEENT: EOMI, PERRL. Sclerae are anicteric. Oral mucosa is pink and moist with no lesions or thrush.   Lymph: No palpable cervical, supraclavicular, subclavicular or axillary lymphadenopathy.   Heart: Regular rate and rhythm.   Lungs: Clear to auscultation bilaterally.   Abdomen: Bowel sounds present, soft, nontender with no palpable hepatosplenomegaly or masses.   Extremities: No lower extremity edema noted bilaterally.   Neuro: Cranial nerves II through XII are grossly intact.  Skin: No rashes, petechiae, or bruising noted on exposed skin.  Laboratory Data:    ECHO from today  Interpretation Summary  Normal left ventricular systolic function. LVEF 57% based on volumetric 3D  analysis.  Left ventricular diastolic function is normal.  Global peak LV longitudinal strain is averaged at -18.8%. This is within  reported normal limits (normal <-18%).     No significant change compared to prior study.    Assessment and Plan:    1. Metastatic breast cancer, ER, OH, HER2+ positive:   Was last treated in [facility] with Herceptin. We have continued Herceptin every 3 weeks as well as daily tamoxifen. CT CAP on 7/22/19 with stable disease. Echocardiogram repeated today with EF 57% and normal LV function; no significant change to prior.   -did have delay of treatment from last week due to recent surgery  -she is doing well today and back at baseline. Will proceed with herceptin today though will reload  her dose (8 mg/m2) since it has been over a week   -Continue Tamoxifen. Will discuss switching to letrozole at next appt with Dr. Aguiar or Jossie barnett/jeanette in 3 weeks prior to next infusion     2. Bone metastasis: On xgeva every 6 weeks. On calcium + vitamin D. Calcium WNL. Cleared by dentist to proceed. Last dose 7/2/19. Due today. Next dose due 10/25      3. LUE lymphedema: Has been to lymphedema therapy and has compression sleeve. No recent issues.     4. Acute cholecystitis s/p laparoscopic cholecystectomy  -presented to ED with RUQ and epigastric pain, and N/V. Found to have gallbladder wall thickening, dilation of common duct and elevated WBC. S/p surgery on 8/31. Now doing well and back to baseline besides some early satiety. Continue to advance diet as possible. Albumin WNL and improved    Caryn Wheeler PA-C  [facility]  [address] [place] [address] [phone]

## 2019-09-13 NOTE — PATIENT INSTRUCTIONS
[facility] Main Line: [phone]    Call triage nurse with chills and/or temperature greater than or equal to 100.4, uncontrolled nausea/vomiting, diarrhea, constipation, dizziness, shortness of breath, chest pain, bleeding, unexplained bruising, or any new/concerning symptoms, questions/concerns.   If you are having any concerning symptoms or wish to speak to a provider before your next infusion visit, please call your care coordinator or triage to notify them so we can adequately serve you.   Triage Nurse Line: [phone]    If after hours, weekends, or holidays [phone]

## 2019-10-08 ENCOUNTER — APPOINTMENT (OUTPATIENT)
Dept: LAB | Facility: CLINIC | Age: 64
End: 2019-10-08
Attending: INTERNAL MEDICINE
Payer: COMMERCIAL

## 2019-10-08 ENCOUNTER — ONCOLOGY VISIT (OUTPATIENT)
Dept: ONCOLOGY | Facility: CLINIC | Age: 64
End: 2019-10-08
Attending: INTERNAL MEDICINE
Payer: COMMERCIAL

## 2019-10-08 VITALS
BODY MASS INDEX: 29.79 KG/M2 | OXYGEN SATURATION: 99 % | SYSTOLIC BLOOD PRESSURE: 115 MMHG | WEIGHT: 162.9 LBS | HEART RATE: 68 BPM | DIASTOLIC BLOOD PRESSURE: 75 MMHG | RESPIRATION RATE: 20 BRPM | TEMPERATURE: 98.1 F

## 2019-10-08 DIAGNOSIS — C50.919 METASTATIC BREAST CANCER: Primary | ICD-10-CM

## 2019-10-08 DIAGNOSIS — C50.912 MALIGNANT NEOPLASM OF LEFT FEMALE BREAST, UNSPECIFIED ESTROGEN RECEPTOR STATUS, UNSPECIFIED SITE OF BREAST (H): Primary | ICD-10-CM

## 2019-10-08 LAB
ALBUMIN SERPL-MCNC: 3.2 G/DL (ref 3.4–5)
ALP SERPL-CCNC: 38 U/L (ref 40–150)
ALT SERPL W P-5'-P-CCNC: 22 U/L (ref 0–50)
ANION GAP SERPL CALCULATED.3IONS-SCNC: 6 MMOL/L (ref 3–14)
AST SERPL W P-5'-P-CCNC: 16 U/L (ref 0–45)
BASOPHILS # BLD AUTO: 0 10E9/L (ref 0–0.2)
BASOPHILS NFR BLD AUTO: 0.5 %
BILIRUB SERPL-MCNC: 0.3 MG/DL (ref 0.2–1.3)
BUN SERPL-MCNC: 15 MG/DL (ref 7–30)
CALCIUM SERPL-MCNC: 8.2 MG/DL (ref 8.5–10.1)
CANCER AG27-29 SERPL-ACNC: <5 U/ML (ref 0–39)
CEA SERPL-MCNC: <0.5 UG/L (ref 0–2.5)
CHLORIDE SERPL-SCNC: 110 MMOL/L (ref 94–109)
CO2 SERPL-SCNC: 27 MMOL/L (ref 20–32)
CREAT SERPL-MCNC: 0.7 MG/DL (ref 0.52–1.04)
DIFFERENTIAL METHOD BLD: ABNORMAL
EOSINOPHIL # BLD AUTO: 0.2 10E9/L (ref 0–0.7)
EOSINOPHIL NFR BLD AUTO: 3.5 %
ERYTHROCYTE [DISTWIDTH] IN BLOOD BY AUTOMATED COUNT: 13.6 % (ref 10–15)
GFR SERPL CREATININE-BSD FRML MDRD: >90 ML/MIN/{1.73_M2}
GLUCOSE SERPL-MCNC: 79 MG/DL (ref 70–99)
HCT VFR BLD AUTO: 35.5 % (ref 35–47)
HGB BLD-MCNC: 10.9 G/DL (ref 11.7–15.7)
IMM GRANULOCYTES # BLD: 0 10E9/L (ref 0–0.4)
IMM GRANULOCYTES NFR BLD: 0 %
LYMPHOCYTES # BLD AUTO: 2.1 10E9/L (ref 0.8–5.3)
LYMPHOCYTES NFR BLD AUTO: 30.8 %
MCH RBC QN AUTO: 29.5 PG (ref 26.5–33)
MCHC RBC AUTO-ENTMCNC: 30.7 G/DL (ref 31.5–36.5)
MCV RBC AUTO: 96 FL (ref 78–100)
MONOCYTES # BLD AUTO: 0.4 10E9/L (ref 0–1.3)
MONOCYTES NFR BLD AUTO: 6.6 %
NEUTROPHILS # BLD AUTO: 3.9 10E9/L (ref 1.6–8.3)
NEUTROPHILS NFR BLD AUTO: 58.6 %
NRBC # BLD AUTO: 0 10*3/UL
NRBC BLD AUTO-RTO: 0 /100
PLATELET # BLD AUTO: 185 10E9/L (ref 150–450)
POTASSIUM SERPL-SCNC: 3.9 MMOL/L (ref 3.4–5.3)
PROT SERPL-MCNC: 6.9 G/DL (ref 6.8–8.8)
RBC # BLD AUTO: 3.7 10E12/L (ref 3.8–5.2)
SODIUM SERPL-SCNC: 142 MMOL/L (ref 133–144)
WBC # BLD AUTO: 6.7 10E9/L (ref 4–11)

## 2019-10-08 PROCEDURE — 96413 CHEMO IV INFUSION 1 HR: CPT

## 2019-10-08 PROCEDURE — 99214 OFFICE O/P EST MOD 30 MIN: CPT | Mod: ZP | Performed by: PHYSICIAN ASSISTANT

## 2019-10-08 PROCEDURE — 85025 COMPLETE CBC W/AUTO DIFF WBC: CPT | Performed by: PHYSICIAN ASSISTANT

## 2019-10-08 PROCEDURE — 86300 IMMUNOASSAY TUMOR CA 15-3: CPT | Performed by: PHYSICIAN ASSISTANT

## 2019-10-08 PROCEDURE — 80053 COMPREHEN METABOLIC PANEL: CPT | Performed by: PHYSICIAN ASSISTANT

## 2019-10-08 PROCEDURE — G0008 ADMIN INFLUENZA VIRUS VAC: HCPCS

## 2019-10-08 PROCEDURE — 25800030 ZZH RX IP 258 OP 636: Mod: ZF | Performed by: PHYSICIAN ASSISTANT

## 2019-10-08 PROCEDURE — 82378 CARCINOEMBRYONIC ANTIGEN: CPT | Performed by: PHYSICIAN ASSISTANT

## 2019-10-08 PROCEDURE — 25000128 H RX IP 250 OP 636: Mod: ZF | Performed by: PHYSICIAN ASSISTANT

## 2019-10-08 PROCEDURE — 90682 RIV4 VACC RECOMBINANT DNA IM: CPT | Mod: ZF | Performed by: PHYSICIAN ASSISTANT

## 2019-10-08 RX ORDER — HEPARIN SODIUM (PORCINE) LOCK FLUSH IV SOLN 100 UNIT/ML 100 UNIT/ML
5 SOLUTION INTRAVENOUS EVERY 8 HOURS
Status: DISCONTINUED | OUTPATIENT
Start: 2019-10-08 | End: 2019-10-08 | Stop reason: HOSPADM

## 2019-10-08 RX ORDER — DIPHENHYDRAMINE HCL 25 MG
50 CAPSULE ORAL ONCE
Status: CANCELLED
Start: 2019-10-08

## 2019-10-08 RX ORDER — LETROZOLE 2.5 MG/1
2.5 TABLET, FILM COATED ORAL DAILY
Qty: 30 TABLET | Refills: 0 | Status: SHIPPED | OUTPATIENT
Start: 2019-10-08 | End: 2019-10-29

## 2019-10-08 RX ORDER — SODIUM CHLORIDE 9 MG/ML
1000 INJECTION, SOLUTION INTRAVENOUS CONTINUOUS PRN
Status: CANCELLED
Start: 2019-10-08

## 2019-10-08 RX ORDER — EPINEPHRINE 1 MG/ML
0.3 INJECTION, SOLUTION INTRAMUSCULAR; SUBCUTANEOUS EVERY 5 MIN PRN
Status: CANCELLED | OUTPATIENT
Start: 2019-10-08

## 2019-10-08 RX ORDER — ALBUTEROL SULFATE 0.83 MG/ML
2.5 SOLUTION RESPIRATORY (INHALATION)
Status: CANCELLED | OUTPATIENT
Start: 2019-10-08

## 2019-10-08 RX ORDER — HEPARIN SODIUM (PORCINE) LOCK FLUSH IV SOLN 100 UNIT/ML 100 UNIT/ML
5 SOLUTION INTRAVENOUS EVERY 8 HOURS
Status: CANCELLED | OUTPATIENT
Start: 2019-10-08

## 2019-10-08 RX ORDER — ACETAMINOPHEN 325 MG/1
650 TABLET ORAL
Status: CANCELLED | OUTPATIENT
Start: 2019-10-08

## 2019-10-08 RX ORDER — EPINEPHRINE 0.3 MG/.3ML
0.3 INJECTION SUBCUTANEOUS EVERY 5 MIN PRN
Status: CANCELLED | OUTPATIENT
Start: 2019-10-08

## 2019-10-08 RX ORDER — LORAZEPAM 2 MG/ML
0.5 INJECTION INTRAMUSCULAR EVERY 4 HOURS PRN
Status: CANCELLED
Start: 2019-10-08

## 2019-10-08 RX ORDER — ALBUTEROL SULFATE 90 UG/1
1-2 AEROSOL, METERED RESPIRATORY (INHALATION)
Status: CANCELLED
Start: 2019-10-08

## 2019-10-08 RX ORDER — DIPHENHYDRAMINE HYDROCHLORIDE 50 MG/ML
50 INJECTION INTRAMUSCULAR; INTRAVENOUS
Status: CANCELLED
Start: 2019-10-08

## 2019-10-08 RX ORDER — MEPERIDINE HYDROCHLORIDE 25 MG/ML
25 INJECTION INTRAMUSCULAR; INTRAVENOUS; SUBCUTANEOUS EVERY 30 MIN PRN
Status: CANCELLED | OUTPATIENT
Start: 2019-10-08

## 2019-10-08 RX ORDER — METHYLPREDNISOLONE SODIUM SUCCINATE 125 MG/2ML
125 INJECTION, POWDER, LYOPHILIZED, FOR SOLUTION INTRAMUSCULAR; INTRAVENOUS
Status: CANCELLED
Start: 2019-10-08

## 2019-10-08 RX ADMIN — INFLUENZA A VIRUS A/BRISBANE/02/2018 (H1N1) RECOMBINANT HEMAGGLUTININ ANTIGEN, INFLUENZA A VIRUS A/KANSAS/14/2017 (H3N2) RECOMBINANT HEMAGGLUTININ ANTIGEN, INFLUENZA B VIRUS B/PHUKET/3073/2013 RECOMBINANT HEMAGGLUTININ ANTIGEN, AND INFLUENZA B VIRUS B/MARYLAND/15/2016 RECOMBINANT HEMAGGLUTININ ANTIGEN 0.5 ML: 45; 45; 45; 45 INJECTION INTRAMUSCULAR at 09:59

## 2019-10-08 RX ADMIN — TRASTUZUMAB 450 MG: 150 INJECTION, POWDER, LYOPHILIZED, FOR SOLUTION INTRAVENOUS at 09:55

## 2019-10-08 RX ADMIN — HEPARIN SODIUM (PORCINE) LOCK FLUSH IV SOLN 100 UNIT/ML 5 ML: 100 SOLUTION at 08:58

## 2019-10-08 RX ADMIN — HEPARIN 5 ML: 100 SYRINGE at 10:01

## 2019-10-08 ASSESSMENT — PAIN SCALES - GENERAL: PAINLEVEL: NO PAIN (0)

## 2019-10-08 NOTE — LETTER
RE: Hoda Brush  [address] [facility] [address]       Oncology/Hematology Visit Note  Oct 8, 2019    Reason for Visit: follow up of ER positive, HER2 positive metastatic left breast cancer    History of Present Illness: Hoda Brush is a 64 year old female with ER positive, HER2 positive metastatic left breast cancer (bone).    TREATMENT HISTORY:  A. Initial diagnosis with metastatic breast cancer in [facility].  Neoadjuvant CAF x 6.   B. Left mastectomy. Left axillary node dissection.  C.  Radiation in 1 dose to R iliac region. g Gy.  C. Herceptin for 2 years taxane for a prescribed course then stopped, monthly zoledronic acid.  She had 2 years of Herceptin with tamoxifen added after chemotherapy.  D.  Tamoxifen alone and zoledronic acid every 3 months.  E.  [place]  We restarted Herceptin every 3 weeks and continued tamoxifen. Bone targeted agent changed to denosumab every 6 weeks.     She is from [facility], formerly from [facility], and [place]  She lives with her daughter and is here for continuation of every 3 week Herceptin, which she receives along with tamoxifen.  Her tumor is ER positive, AR positive, HER-2 positive.  She had metastatic disease at the time of presentation with bone-only metastases by report.  She presented with metastatic disease in 02/2014.  Staging was initially bone-only metastases by report.  She did her staging in 02/2014 that showed that she had stage IV disease, T4N2M1 invasive ductal carcinoma of the left breast.  She had a right hip metastasis.  She underwent neoadjuvant CAF, left mastectomy, left axillary lymph node dissection and radiation.  She had radiation of the right iliac region where she had metastatic disease.  Pathology report from [facility] shows the tumor to be positive for ER in 75% of the cells, positive for AR in 40% of the cells, and the HER-2 was 3+ positive by immunohistochemistry.  The Ki-67 was 20%.  She had no  evidence of nonbone metastatic disease on her PET/CT scan from 08/2017.     Restaging on 7/22/19 was stable. Had acute abdominal pain and N/V and found to have cholecystitis in the ER. Had cholecystectomy on 8/31. Herceptin resumed on 9/13/19.    Interval History:  Hoda is here with . She has been having some constipation since her cholecystectomy. She is taking Senna-S 2 tabs daily which is working. She is now back to her regular diet. She eats lots of fruits and vegetables and does not eat many fried or fatty foods. She denies any abdominal pain, nausea, melena or hematochezia. She did have a colonoscopy in February 2018 which was normal. She admits she probably does not drink enough water during the day. She is exercising regularly by walking. She is currently on Tamoxifen. Plan to switch to letrozole starting today. She does not have any problems with myalgias/arthralgias currently.    Review of Systems:  Patient denies any of the following except if noted above: fevers, chills, cough, chest pain, palpitations, dyspnea, dizziness, urinary concerns, headaches, rashes or skin lesions, bleeding or bruising issues.    Current Outpatient Medications   Medication Sig Dispense Refill     acetaminophen (TYLENOL) 500 MG tablet Take 1,000 mg by mouth every 6 hours as needed for mild pain       ibuprofen (ADVIL/MOTRIN) 600 MG tablet Take 600 mg by mouth every 6 hours as needed for moderate pain       melatonin 3 MG tablet Take 1 tablet (3 mg) by mouth nightly as needed for sleep 30 tablet 11     order for DME Equipment being ordered: 2 Mastectomy bras and 1 prosthetheses. (Patient not taking: Reported on 9/13/2019) 2 Piece 0     order for DME L UE class 2 compression sleeve and gauntlet  Night garment  Bandaging supplies (Patient not taking: Reported on 9/13/2019) 1 each 1     oxyCODONE (ROXICODONE) 5 MG tablet Take 1 tablet (5 mg) by mouth every 3 hours as needed for breakthrough pain or severe pain (Patient  not taking: Reported on 9/13/2019) 12 tablet 0     SENNA-docusate sodium (SENNA S) 8.6-50 MG tablet Take 1 tablet by mouth At Bedtime Take while taking oxycodone to prevent constipation. (Patient not taking: Reported on 9/13/2019) 24 tablet 0     tamoxifen (NOLVADEX) 20 MG tablet Take 1 tablet (20 mg) by mouth daily 90 tablet 3     VITAMIN D, CHOLECALCIFEROL, PO Take 1,000 Units by mouth daily         Physical Examination:  General: The patient is a pleasant female in no acute distress.  There were no vitals taken for this visit.  Wt Readings from Last 10 Encounters:   09/13/19 72.6 kg (160 lb)   09/01/19 75.5 kg (166 lb 8 oz)   08/28/19 75.8 kg (167 lb)   08/13/19 76.7 kg (169 lb 1.6 oz)   07/23/19 77.5 kg (170 lb 12.8 oz)   07/02/19 76.7 kg (169 lb 3.2 oz)   06/11/19 78.9 kg (173 lb 14.4 oz)   05/28/19 80.3 kg (177 lb)   05/21/19 80.3 kg (177 lb)   04/30/19 79.2 kg (174 lb 9.6 oz)     HEENT: EOMI, PERRL. Sclerae are anicteric. Oral mucosa is pink and moist with no lesions or thrush.   Lymph: Neck is supple with no lymphadenopathy in the cervical or supraclavicular areas.   Heart: Regular rate and rhythm.   Lungs: Clear to auscultation bilaterally.   Abdomen: Bowel sounds present, soft, nontender with no palpable hepatosplenomegaly or masses.   Extremities: No lower extremity edema noted bilaterally.   Neuro: Cranial nerves II through XII are grossly intact.  Skin: No rashes, petechiae, or bruising noted on exposed skin.    Laboratory Data:  Results for NATASHA LOUIS (MRN [id number]) as of 10/8/2019 10:40   10/8/2019 08:57   Sodium 142   Potassium 3.9   Chloride 110 (H)   Carbon Dioxide 27   Urea Nitrogen 15   Creatinine 0.70   GFR Estimate >90   GFR Estimate If Black >90   Calcium 8.2 (L)   Anion Gap 6   Albumin 3.2 (L)   Protein Total 6.9   Bilirubin Total 0.3   Alkaline Phosphatase 38 (L)   ALT 22   AST 16   Glucose 79   WBC 6.7   Hemoglobin 10.9 (L)   Hematocrit 35.5   Platelet Count 185   RBC Count  3.70 (L)   MCV 96   MCH 29.5   MCHC 30.7 (L)   RDW 13.6   Diff Method Automated Method   % Neutrophils 58.6   % Lymphocytes 30.8   % Monocytes 6.6   % Eosinophils 3.5   % Basophils 0.5   % Immature Granulocytes 0.0   Nucleated RBCs 0   Absolute Neutrophil 3.9   Absolute Lymphocytes 2.1   Absolute Monocytes 0.4   Absolute Eosinophils 0.2   Absolute Basophils 0.0   Abs Immature Granulocytes 0.0   Absolute Nucleated RBC 0.0      9/13/2019 10:49 10/8/2019 08:57   CA 27-29 13 <5      9/13/2019 10:49 10/8/2019 08:57   CEA <0.5 <0.5     Assessment and Plan:    1. Metastatic breast cancer, ER, [place], HER2+ positive:   Was last treated in [facility] with Herceptin. We have continued Herceptin every 3 weeks as well as daily tamoxifen. CT CAP on 7/22/19 with stable disease.   --Last Echocardiogram  on 9/13/19 with EF 57% and normal LV function; no significant change to prior.   - Plan to change to letrozole. Reviewed possible side effects such as hotflashes, vaginal dryness, myalgias/arthralgias and loss of bone density. She will discontinue tamoxifen and start letrozole.  - Tumor markers stable.  -f/u in 3 weeks prior to next infusion   -Restaging on 11/18/19     2. Bone metastasis: On xgeva every 6 weeks. On calcium + vitamin D. Calcium WNL. Cleared by dentist to proceed. Last dose 7/2/19. Due today. Next dose due 10/25      3. LUE lymphedema: Has been to lymphedema therapy and has compression sleeve. No recent issues.      4. Constipation: started post lap cholecystectomy on 8/31/19. Colonoscopy in 2/2018 was normal. Managed with Senna-S 2 tabs daily. Discussed increasing oral hydration, prune juice. She is eating fruits/vegetables for fiber.     Jossie Sparrow PA-C  [facility]  [address], [place] [id number] [phone]

## 2019-10-08 NOTE — NURSING NOTE
"Oncology Rooming Note    October 8, 2019 9:13 AM   Hoda Brush is a 64 year old female who presents for:    Chief Complaint   Patient presents with     Port Draw     Port labs collected by RN.      Oncology Clinic Visit     Pt is here for a rtn for Breast Cancer     Initial Vitals: Blood Pressure 115/75 (BP Location: Right arm, Patient Position: Sitting)   Pulse 68   Temperature 98.1  F (36.7  C) (Oral)   Respiration 20   Weight 73.9 kg (162 lb 14.4 oz)   Oxygen Saturation 99%   Body Mass Index 29.79 kg/m   Estimated body mass index is 29.79 kg/m  as calculated from the following:    Height as of 8/31/19: 1.575 m (5' 2\").    Weight as of this encounter: 73.9 kg (162 lb 14.4 oz). Body surface area is 1.8 meters squared.  No Pain (0) Comment: Data Unavailable   No LMP recorded. Patient is postmenopausal.  Allergies reviewed: Yes  Medications reviewed: Yes    Medications: NONE  Pharmacy name entered into [employer]: [id number] - [place] - [address] AT  [address] [facility]    Clinical concerns: none       Alejandra Ibarra MA              "

## 2019-10-08 NOTE — PATIENT INSTRUCTIONS
Contact Numbers  [facility]: [phone]    After Hours:  [phone]  Triage: [phone]    Please call the [facility] Triage line if you experience a temperature greater than or equal to 100.5, shaking chills, have uncontrolled nausea, vomiting and/or diarrhea, dizziness, shortness of breath, chest pain, bleeding, unexplained bruising, or if you have any other new/concerning symptoms, questions or concerns.     If it is after hours, weekends, or holidays, please call the main hospital  at  [phone] and ask to speak to the Oncology doctor on call.     If you are having any concerning symptoms or wish to speak to a provider before your next infusion visit, please call your care coordinator or triage to notify them so we can adequately serve you.     If you need a refill on a narcotic prescription or other medication, please call triage before your infusion appointment.

## 2019-10-08 NOTE — PROGRESS NOTES
Oncology/Hematology Visit Note  Oct 8, 2019    Reason for Visit: follow up of ER positive, HER2 positive metastatic left breast cancer    History of Present Illness: Hoda Brush is a 64 year old female with ER positive, HER2 positive metastatic left breast cancer (bone).    TREATMENT HISTORY:  A. Initial diagnosis with metastatic breast cancer in [facility].  Neoadjuvant CAF x 6.   B. Left mastectomy. Left axillary node dissection.  C.  Radiation in 1 dose to R iliac region. g Gy.  C. Herceptin for 2 years taxane for a prescribed course then stopped, monthly zoledronic acid.  She had 2 years of Herceptin with tamoxifen added after chemotherapy.  D.  Tamoxifen alone and zoledronic acid every 3 months.  E.  [place]  We restarted Herceptin every 3 weeks and continued tamoxifen. Bone targeted agent changed to denosumab every 6 weeks.     She is from [facility], formerly from [facility], and came [place] the [place]  She lives with her daughter and is here for continuation of every 3 week Herceptin, which she receives along with tamoxifen.  Her tumor is ER positive, [place] positive, HER-2 positive.  She had metastatic disease at the time of presentation with bone-only metastases by report.  She presented with metastatic disease in 02/2014.  Staging was initially bone-only metastases by report.  She did her staging in 02/2014 that showed that she had stage IV disease, T4N2M1 invasive ductal carcinoma of the left breast.  She had a right hip metastasis.  She underwent neoadjuvant CAF, left mastectomy, left axillary lymph node dissection and radiation.  She had radiation of the right iliac region where she had metastatic disease.  Pathology report from [facility] shows the tumor to be positive for ER in 75% of the cells, positive for MN in 40% of the cells, and the HER-2 was 3+ positive by immunohistochemistry.  The Ki-67 was 20%.  She had no evidence of nonbone metastatic disease on her PET/CT scan from  08/2017.     Restaging on 7/22/19 was stable. Had acute abdominal pain and N/V and found to have cholecystitis in the ER. Had cholecystectomy on 8/31. Herceptin resumed on 9/13/19.    Interval History:  Hoda is here with . She has been having some constipation since her cholecystectomy. She is taking Senna-S 2 tabs daily which is working. She is now back to her regular diet. She eats lots of fruits and vegetables and does not eat many fried or fatty foods. She denies any abdominal pain, nausea, melena or hematochezia. She did have a colonoscopy in February 2018 which was normal. She admits she probably does not drink enough water during the day. She is exercising regularly by walking. She is currently on Tamoxifen. Plan to switch to letrozole starting today. She does not have any problems with myalgias/arthralgias currently.    Review of Systems:  Patient denies any of the following except if noted above: fevers, chills, cough, chest pain, palpitations, dyspnea, dizziness, urinary concerns, headaches, rashes or skin lesions, bleeding or bruising issues.    Current Outpatient Medications   Medication Sig Dispense Refill     acetaminophen (TYLENOL) 500 MG tablet Take 1,000 mg by mouth every 6 hours as needed for mild pain       ibuprofen (ADVIL/MOTRIN) 600 MG tablet Take 600 mg by mouth every 6 hours as needed for moderate pain       melatonin 3 MG tablet Take 1 tablet (3 mg) by mouth nightly as needed for sleep 30 tablet 11     order for DME Equipment being ordered: 2 Mastectomy bras and 1 prosthetheses. (Patient not taking: Reported on 9/13/2019) 2 Piece 0     order for DME L UE class 2 compression sleeve and gauntlet  Night garment  Bandaging supplies (Patient not taking: Reported on 9/13/2019) 1 each 1     oxyCODONE (ROXICODONE) 5 MG tablet Take 1 tablet (5 mg) by mouth every 3 hours as needed for breakthrough pain or severe pain (Patient not taking: Reported on 9/13/2019) 12 tablet 0      SENNA-docusate sodium (SENNA S) 8.6-50 MG tablet Take 1 tablet by mouth At Bedtime Take while taking oxycodone to prevent constipation. (Patient not taking: Reported on 9/13/2019) 24 tablet 0     tamoxifen (NOLVADEX) 20 MG tablet Take 1 tablet (20 mg) by mouth daily 90 tablet 3     VITAMIN D, CHOLECALCIFEROL, PO Take 1,000 Units by mouth daily         Physical Examination:  General: The patient is a pleasant female in no acute distress.  There were no vitals taken for this visit.  Wt Readings from Last 10 Encounters:   09/13/19 72.6 kg (160 lb)   09/01/19 75.5 kg (166 lb 8 oz)   08/28/19 75.8 kg (167 lb)   08/13/19 76.7 kg (169 lb 1.6 oz)   07/23/19 77.5 kg (170 lb 12.8 oz)   07/02/19 76.7 kg (169 lb 3.2 oz)   06/11/19 78.9 kg (173 lb 14.4 oz)   05/28/19 80.3 kg (177 lb)   05/21/19 80.3 kg (177 lb)   04/30/19 79.2 kg (174 lb 9.6 oz)     HEENT: EOMI, PERRL. Sclerae are anicteric. Oral mucosa is pink and moist with no lesions or thrush.   Lymph: Neck is supple with no lymphadenopathy in the cervical or supraclavicular areas.   Heart: Regular rate and rhythm.   Lungs: Clear to auscultation bilaterally.   Abdomen: Bowel sounds present, soft, nontender with no palpable hepatosplenomegaly or masses.   Extremities: No lower extremity edema noted bilaterally.   Neuro: Cranial nerves II through XII are grossly intact.  Skin: No rashes, petechiae, or bruising noted on exposed skin.    Laboratory Data:  Results for NATASHA LOUIS (MRN [id number]) as of 10/8/2019 10:40   10/8/2019 08:57   Sodium 142   Potassium 3.9   Chloride 110 (H)   Carbon Dioxide 27   Urea Nitrogen 15   Creatinine 0.70   GFR Estimate >90   GFR Estimate If Black >90   Calcium 8.2 (L)   Anion Gap 6   Albumin 3.2 (L)   Protein Total 6.9   Bilirubin Total 0.3   Alkaline Phosphatase 38 (L)   ALT 22   AST 16   Glucose 79   WBC 6.7   Hemoglobin 10.9 (L)   Hematocrit 35.5   Platelet Count 185   RBC Count 3.70 (L)   MCV 96   MCH 29.5   MCHC 30.7 (L)   RDW  13.6   Diff Method Automated Method   % Neutrophils 58.6   % Lymphocytes 30.8   % Monocytes 6.6   % Eosinophils 3.5   % Basophils 0.5   % Immature Granulocytes 0.0   Nucleated RBCs 0   Absolute Neutrophil 3.9   Absolute Lymphocytes 2.1   Absolute Monocytes 0.4   Absolute Eosinophils 0.2   Absolute Basophils 0.0   Abs Immature Granulocytes 0.0   Absolute Nucleated RBC 0.0      9/13/2019 10:49 10/8/2019 08:57   CA 27-29 13 <5      9/13/2019 10:49 10/8/2019 08:57   CEA <0.5 <0.5     Assessment and Plan:    1. Metastatic breast cancer, ER, [place], HER2+ positive:   Was last treated in [facility] with Herceptin. We have continued Herceptin every 3 weeks as well as daily tamoxifen. CT CAP on 7/22/19 with stable disease.   --Last Echocardiogram on 9/13/19 with EF 57% and normal LV function; no significant change to prior.   - Plan to change to letrozole. Reviewed possible side effects such as hotflashes, vaginal dryness, myalgias/arthralgias and loss of bone density. She will discontinue tamoxifen and start letrozole.  - Tumor markers stable.  -f/u in 3 weeks prior to next infusion   -Restaging on 11/18/19     2. Bone metastasis: On xgeva every 6 weeks. On calcium + vitamin D. Calcium WNL. Cleared by dentist to proceed. Last dose 7/2/19. Due today. Next dose due 10/25      3. LUE lymphedema: Has been to lymphedema therapy and has compression sleeve. No recent issues.      4. Constipation: started post lap cholecystectomy on 8/31/19. Colonoscopy in 2/2018 was normal. Managed with Senna-S 2 tabs daily. Discussed increasing oral hydration, prune juice. She is eating fruits/vegetables for fiber.       Jossie Sparrow PA-C  [facility]  [address], [place] [id number] [phone]

## 2019-10-08 NOTE — PROGRESS NOTES
Infusion Nursing Note:  Hoda Brush presents today for C36 Herceptin.    Patient seen by provider today: Yes: SEMAJ Escobar    Treatment Conditions:  Lab Results   Component Value Date    HGB 10.9 10/08/2019     Lab Results   Component Value Date    WBC 6.7 10/08/2019      Lab Results   Component Value Date    ANEU 3.9 10/08/2019     Lab Results   Component Value Date     10/08/2019      Lab Results   Component Value Date     10/08/2019                   Lab Results   Component Value Date    POTASSIUM 3.9 10/08/2019           No results found for: MAG         Lab Results   Component Value Date    CR 0.70 10/08/2019                   Lab Results   Component Value Date    TAYLER 8.2 10/08/2019                Lab Results   Component Value Date    BILITOTAL 0.3 10/08/2019           Lab Results   Component Value Date    ALBUMIN 3.2 10/08/2019                    Lab Results   Component Value Date    ALT 22 10/08/2019           Lab Results   Component Value Date    AST 16 10/08/2019       Results reviewed, labs MET treatment parameters, ok to proceed with treatment.  ECHO/MUGA completed 9/13  EF 57%.    Intravenous Access:  Implanted Port.  Access dc'd at time of discharge.      Note:   Results reviewed, copy given to patient.  Proceed with treatment.    Copy of AVS given to patient. + Blood return from PORT pre and post infusion.  Tolerated infusion without incident. No Prescriptions filled today.   D/C in care of self.  Pt will return 10/29 for next appointment.    present for the entire infusion, VIS given to pt in Pakistani.    Susu Seay, RN, RN

## 2019-10-29 ENCOUNTER — APPOINTMENT (OUTPATIENT)
Dept: LAB | Facility: CLINIC | Age: 64
End: 2019-10-29
Attending: INTERNAL MEDICINE
Payer: COMMERCIAL

## 2019-10-29 ENCOUNTER — INFUSION THERAPY VISIT (OUTPATIENT)
Dept: ONCOLOGY | Facility: CLINIC | Age: 64
End: 2019-10-29
Attending: PHYSICIAN ASSISTANT
Payer: COMMERCIAL

## 2019-10-29 VITALS
BODY MASS INDEX: 29.63 KG/M2 | SYSTOLIC BLOOD PRESSURE: 105 MMHG | RESPIRATION RATE: 20 BRPM | HEART RATE: 71 BPM | OXYGEN SATURATION: 99 % | DIASTOLIC BLOOD PRESSURE: 72 MMHG | WEIGHT: 162 LBS | TEMPERATURE: 97.8 F

## 2019-10-29 DIAGNOSIS — C79.51 BONE METASTASIS: ICD-10-CM

## 2019-10-29 DIAGNOSIS — C50.919 METASTATIC BREAST CANCER: ICD-10-CM

## 2019-10-29 DIAGNOSIS — C50.912 MALIGNANT NEOPLASM OF LEFT FEMALE BREAST, UNSPECIFIED ESTROGEN RECEPTOR STATUS, UNSPECIFIED SITE OF BREAST (H): Primary | ICD-10-CM

## 2019-10-29 LAB
ALBUMIN SERPL-MCNC: 3.1 G/DL (ref 3.4–5)
ALP SERPL-CCNC: 38 U/L (ref 40–150)
ALT SERPL W P-5'-P-CCNC: 28 U/L (ref 0–50)
ANION GAP SERPL CALCULATED.3IONS-SCNC: 4 MMOL/L (ref 3–14)
AST SERPL W P-5'-P-CCNC: 27 U/L (ref 0–45)
BASOPHILS # BLD AUTO: 0 10E9/L (ref 0–0.2)
BASOPHILS NFR BLD AUTO: 0.7 %
BILIRUB SERPL-MCNC: 0.3 MG/DL (ref 0.2–1.3)
BUN SERPL-MCNC: 15 MG/DL (ref 7–30)
CALCIUM SERPL-MCNC: 8.1 MG/DL (ref 8.5–10.1)
CANCER AG27-29 SERPL-ACNC: 7 U/ML (ref 0–39)
CEA SERPL-MCNC: 0.6 UG/L (ref 0–2.5)
CHLORIDE SERPL-SCNC: 110 MMOL/L (ref 94–109)
CO2 SERPL-SCNC: 27 MMOL/L (ref 20–32)
CREAT SERPL-MCNC: 0.79 MG/DL (ref 0.52–1.04)
DIFFERENTIAL METHOD BLD: ABNORMAL
EOSINOPHIL # BLD AUTO: 0.2 10E9/L (ref 0–0.7)
EOSINOPHIL NFR BLD AUTO: 3.9 %
ERYTHROCYTE [DISTWIDTH] IN BLOOD BY AUTOMATED COUNT: 13.5 % (ref 10–15)
GFR SERPL CREATININE-BSD FRML MDRD: 79 ML/MIN/{1.73_M2}
GLUCOSE SERPL-MCNC: 90 MG/DL (ref 70–99)
HCT VFR BLD AUTO: 34.2 % (ref 35–47)
HGB BLD-MCNC: 10.7 G/DL (ref 11.7–15.7)
IMM GRANULOCYTES # BLD: 0 10E9/L (ref 0–0.4)
IMM GRANULOCYTES NFR BLD: 0.2 %
LYMPHOCYTES # BLD AUTO: 2.3 10E9/L (ref 0.8–5.3)
LYMPHOCYTES NFR BLD AUTO: 41.4 %
MCH RBC QN AUTO: 29.9 PG (ref 26.5–33)
MCHC RBC AUTO-ENTMCNC: 31.3 G/DL (ref 31.5–36.5)
MCV RBC AUTO: 96 FL (ref 78–100)
MONOCYTES # BLD AUTO: 0.4 10E9/L (ref 0–1.3)
MONOCYTES NFR BLD AUTO: 7.5 %
NEUTROPHILS # BLD AUTO: 2.5 10E9/L (ref 1.6–8.3)
NEUTROPHILS NFR BLD AUTO: 46.3 %
NRBC # BLD AUTO: 0 10*3/UL
NRBC BLD AUTO-RTO: 0 /100
PLATELET # BLD AUTO: 204 10E9/L (ref 150–450)
POTASSIUM SERPL-SCNC: 3.8 MMOL/L (ref 3.4–5.3)
PROT SERPL-MCNC: 6.9 G/DL (ref 6.8–8.8)
RBC # BLD AUTO: 3.58 10E12/L (ref 3.8–5.2)
SODIUM SERPL-SCNC: 141 MMOL/L (ref 133–144)
WBC # BLD AUTO: 5.4 10E9/L (ref 4–11)

## 2019-10-29 PROCEDURE — 80053 COMPREHEN METABOLIC PANEL: CPT | Performed by: PHYSICIAN ASSISTANT

## 2019-10-29 PROCEDURE — 25800030 ZZH RX IP 258 OP 636: Mod: ZF | Performed by: PHYSICIAN ASSISTANT

## 2019-10-29 PROCEDURE — 25000128 H RX IP 250 OP 636: Mod: ZF | Performed by: INTERNAL MEDICINE

## 2019-10-29 PROCEDURE — 99214 OFFICE O/P EST MOD 30 MIN: CPT | Mod: ZP | Performed by: PHYSICIAN ASSISTANT

## 2019-10-29 PROCEDURE — 85025 COMPLETE CBC W/AUTO DIFF WBC: CPT | Performed by: PHYSICIAN ASSISTANT

## 2019-10-29 PROCEDURE — 96413 CHEMO IV INFUSION 1 HR: CPT

## 2019-10-29 PROCEDURE — 96372 THER/PROPH/DIAG INJ SC/IM: CPT | Mod: 59

## 2019-10-29 PROCEDURE — 82378 CARCINOEMBRYONIC ANTIGEN: CPT | Performed by: PHYSICIAN ASSISTANT

## 2019-10-29 PROCEDURE — 25000128 H RX IP 250 OP 636: Mod: ZF | Performed by: PHYSICIAN ASSISTANT

## 2019-10-29 PROCEDURE — 86300 IMMUNOASSAY TUMOR CA 15-3: CPT | Performed by: PHYSICIAN ASSISTANT

## 2019-10-29 PROCEDURE — G0463 HOSPITAL OUTPT CLINIC VISIT: HCPCS | Mod: ZF

## 2019-10-29 RX ORDER — ACETAMINOPHEN 325 MG/1
650 TABLET ORAL
Status: CANCELLED | OUTPATIENT
Start: 2019-10-29

## 2019-10-29 RX ORDER — HEPARIN SODIUM (PORCINE) LOCK FLUSH IV SOLN 100 UNIT/ML 100 UNIT/ML
5 SOLUTION INTRAVENOUS EVERY 8 HOURS
Status: DISCONTINUED | OUTPATIENT
Start: 2019-10-29 | End: 2019-10-29 | Stop reason: HOSPADM

## 2019-10-29 RX ORDER — DIPHENHYDRAMINE HCL 25 MG
50 CAPSULE ORAL ONCE
Status: CANCELLED
Start: 2019-10-29

## 2019-10-29 RX ORDER — HEPARIN SODIUM (PORCINE) LOCK FLUSH IV SOLN 100 UNIT/ML 100 UNIT/ML
5 SOLUTION INTRAVENOUS EVERY 8 HOURS
Status: CANCELLED | OUTPATIENT
Start: 2019-10-29

## 2019-10-29 RX ORDER — LORAZEPAM 2 MG/ML
0.5 INJECTION INTRAMUSCULAR EVERY 4 HOURS PRN
Status: CANCELLED
Start: 2019-10-29

## 2019-10-29 RX ORDER — LETROZOLE 2.5 MG/1
2.5 TABLET, FILM COATED ORAL DAILY
Qty: 90 TABLET | Refills: 3 | Status: SHIPPED | OUTPATIENT
Start: 2019-10-29 | End: 2020-10-02

## 2019-10-29 RX ORDER — ALBUTEROL SULFATE 0.83 MG/ML
2.5 SOLUTION RESPIRATORY (INHALATION)
Status: CANCELLED | OUTPATIENT
Start: 2019-10-29

## 2019-10-29 RX ORDER — METHYLPREDNISOLONE SODIUM SUCCINATE 125 MG/2ML
125 INJECTION, POWDER, LYOPHILIZED, FOR SOLUTION INTRAMUSCULAR; INTRAVENOUS
Status: CANCELLED
Start: 2019-10-29

## 2019-10-29 RX ORDER — ALBUTEROL SULFATE 90 UG/1
1-2 AEROSOL, METERED RESPIRATORY (INHALATION)
Status: CANCELLED
Start: 2019-10-29

## 2019-10-29 RX ORDER — MEPERIDINE HYDROCHLORIDE 25 MG/ML
25 INJECTION INTRAMUSCULAR; INTRAVENOUS; SUBCUTANEOUS EVERY 30 MIN PRN
Status: CANCELLED | OUTPATIENT
Start: 2019-10-29

## 2019-10-29 RX ORDER — SODIUM CHLORIDE 9 MG/ML
1000 INJECTION, SOLUTION INTRAVENOUS CONTINUOUS PRN
Status: CANCELLED
Start: 2019-10-29

## 2019-10-29 RX ORDER — EPINEPHRINE 0.3 MG/.3ML
0.3 INJECTION SUBCUTANEOUS EVERY 5 MIN PRN
Status: CANCELLED | OUTPATIENT
Start: 2019-10-29

## 2019-10-29 RX ORDER — DIPHENHYDRAMINE HYDROCHLORIDE 50 MG/ML
50 INJECTION INTRAMUSCULAR; INTRAVENOUS
Status: CANCELLED
Start: 2019-10-29

## 2019-10-29 RX ORDER — EPINEPHRINE 1 MG/ML
0.3 INJECTION, SOLUTION INTRAMUSCULAR; SUBCUTANEOUS EVERY 5 MIN PRN
Status: CANCELLED | OUTPATIENT
Start: 2019-10-29

## 2019-10-29 RX ADMIN — DENOSUMAB 120 MG: 120 INJECTION SUBCUTANEOUS at 09:47

## 2019-10-29 RX ADMIN — HEPARIN SODIUM (PORCINE) LOCK FLUSH IV SOLN 100 UNIT/ML 5 ML: 100 SOLUTION at 08:00

## 2019-10-29 RX ADMIN — TRASTUZUMAB 450 MG: 150 INJECTION, POWDER, LYOPHILIZED, FOR SOLUTION INTRAVENOUS at 09:41

## 2019-10-29 RX ADMIN — HEPARIN 5 ML: 100 SYRINGE at 10:18

## 2019-10-29 RX ADMIN — SODIUM CHLORIDE 250 ML: 9 INJECTION, SOLUTION INTRAVENOUS at 09:17

## 2019-10-29 ASSESSMENT — PAIN SCALES - GENERAL: PAINLEVEL: NO PAIN (0)

## 2019-10-29 NOTE — PATIENT INSTRUCTIONS
[facility] Triage and after hours / weekends / holidays:  [phone]    Please call the triage or after hours line if you experience a temperature greater than or equal to 100.5, shaking chills, have uncontrolled nausea, vomiting and/or diarrhea, dizziness, shortness of breath, chest pain, bleeding, unexplained bruising, or if you have any other new/concerning symptoms, questions or concerns.      If you are having any concerning symptoms or wish to speak to a provider before your next infusion visit, please call your care coordinator or triage to notify them so we can adequately serve you.     If you need a refill on a narcotic prescription or other medication, please call before your infusion appointment.                 October 2019 Sunday Monday Tuesday Wednesday Thursday Friday Saturday             1     2     3     4     5       6     7     8    [facility] LAB DRAW   8:30 AM   (15 min.)    [facility] LAB DRAW   [facility] Lab Draw    [facility] RETURN   8:55 AM   (90 min.)   Jossie Sparrow PA   [facility] ONC INFUSION 60  10:00 AM   (60 min.)    ONCOLOGY INFUSION   [facility] 9     10     11     12       13     14     15     16     17     18     19       20     21     22     23     24     25     26       27     28     29    [facility] DRAW   7:45 AM   (15 min.)    [facility] DRAW   [facility] Lab Draw    [facility] RETURN   8:05 AM   (90 min.)   Jossie Sparrow PA   [facility] ONC INFUSION 60   9:30 AM   (60 min.)    ONCOLOGY INFUSION   [facility] 30 31 November 2019 Sunday Monday Tuesday Wednesday Thursday Friday Saturday                            1     2       3     4     5     6     7     8     9       10     11     12     13     14     15     16       17     18    CT CHEST/ABDOMEN/PELVIS W   1:00 PM   (20 min.)   CT1   [facility] CT 19      20     21    [facility]   1:30 PM   (15 min.)   [facility] LAB DRAW   [facility] Lab Draw    [facility] RETURN   1:55 PM   (50 min.)   Jossie Sparrow PA   [facility] ONC INFUSION 60   3:00 PM   (60 min.)    ONCOLOGY INFUSION   [facility] 22     23       24     25     26     27     28     29     30                     Lab Results:  Recent Results (from the past 12 hour(s))   CBC with platelets differential    Collection Time: 10/29/19  8:01 AM   Result Value Ref Range    WBC 5.4 4.0 - 11.0 10e9/L    RBC Count 3.58 (L) 3.8 - 5.2 10e12/L    Hemoglobin 10.7 (L) 11.7 - 15.7 g/dL    Hematocrit 34.2 (L) 35.0 - 47.0 %    MCV 96 78 - 100 fl    MCH 29.9 26.5 - 33.0 pg    MCHC 31.3 (L) 31.5 - 36.5 g/dL    RDW 13.5 10.0 - 15.0 %    Platelet Count 204 150 - 450 10e9/L    Diff Method Automated Method     % Neutrophils 46.3 %    % Lymphocytes 41.4 %    % Monocytes 7.5 %    % Eosinophils 3.9 %    % Basophils 0.7 %    % Immature Granulocytes 0.2 %    Nucleated RBCs 0 0 /100    Absolute Neutrophil 2.5 1.6 - 8.3 10e9/L    Absolute Lymphocytes 2.3 0.8 - 5.3 10e9/L    Absolute Monocytes 0.4 0.0 - 1.3 10e9/L    Absolute Eosinophils 0.2 0.0 - 0.7 10e9/L    Absolute Basophils 0.0 0.0 - 0.2 10e9/L    Abs Immature Granulocytes 0.0 0 - 0.4 10e9/L    Absolute Nucleated RBC 0.0    Comprehensive metabolic panel    Collection Time: 10/29/19  8:01 AM   Result Value Ref Range    Sodium 141 133 - 144 mmol/L    Potassium 3.8 3.4 - 5.3 mmol/L    Chloride 110 (H) 94 - 109 mmol/L    Carbon Dioxide 27 20 - 32 mmol/L    Anion Gap 4 3 - 14 mmol/L    Glucose 90 70 - 99 mg/dL    Urea Nitrogen 15 7 - 30 mg/dL    Creatinine 0.79 0.52 - 1.04 mg/dL    GFR Estimate 79 >60 mL/min/[1.73_m2]    GFR Estimate If Black >90 >60 mL/min/[1.73_m2]    Calcium 8.1 (L) 8.5 - 10.1 mg/dL    Bilirubin Total 0.3 0.2 - 1.3 mg/dL    Albumin 3.1 (L) 3.4 - 5.0 g/dL    Protein Total 6.9 6.8 - 8.8 g/dL    Alkaline  Phosphatase 38 (L) 40 - 150 U/L    ALT 28 0 - 50 U/L    AST 27 0 - 45 U/L

## 2019-10-29 NOTE — PROGRESS NOTES
Infusion Nursing Note:  Hoda Brush presents today for Day 1 Cycle 37 of Herceptin and Xgeva injection.    Patient seen by provider today: Yes: Jossie CHA   present during visit today: Yes, Language: Malagasy.     Note: Patient presents to infusion clinic feeling well. She denies pain and has no specific complaints or concerns. She does verify she is taking a Vitamin D/Calcium Supplement.    Intravenous Access:  Implanted Port.    Treatment Conditions:  Lab Results   Component Value Date    HGB 10.7 10/29/2019     Lab Results   Component Value Date    WBC 5.4 10/29/2019      Lab Results   Component Value Date    ANEU 2.5 10/29/2019     Lab Results   Component Value Date     10/29/2019      Lab Results   Component Value Date     10/29/2019                   Lab Results   Component Value Date    POTASSIUM 3.8 10/29/2019           No results found for: MAG         Lab Results   Component Value Date    CR 0.79 10/29/2019                   Lab Results   Component Value Date    TAYLER 8.1 10/29/2019                Lab Results   Component Value Date    BILITOTAL 0.3 10/29/2019           Lab Results   Component Value Date    ALBUMIN 3.1 10/29/2019                    Lab Results   Component Value Date    ALT 28 10/29/2019           Lab Results   Component Value Date    AST 27 10/29/2019         Treatment Conditions:  Results reviewed, labs MET treatment parameters, ok to proceed with treatment. Corrected Calcium 8.8  ECHO/MUGA completed 9/13 EF 57%.      Post Infusion Assessment:  Patient tolerated infusion without incident.  Patient tolerated injection without incident. 1 subcutaneous Xgeva injection given in right upper arm  Blood return noted pre and post infusion.  Site patent and intact, free from redness, edema or discomfort.  No evidence of extravasations.  Access discontinued per protocol.       Discharge Plan:   Patient declined prescription refills.  Discharge instructions reviewed  with: Patient.  Patient and/or family verbalized understanding of discharge instructions and all questions answered.  Copy of AVS reviewed with patient and/or family. Patient will return 11/21 for next appointment.  Patient discharged in stable condition accompanied by: self.  Departure Mode: Ambulatory.  Face to Face time: 0 minutes.    Prakash Benjamin RN

## 2019-10-29 NOTE — NURSING NOTE
"Oncology Rooming Note    October 29, 2019 8:14 AM   Hoda Brush is a 64 year old female who presents for:    Chief Complaint   Patient presents with     Port Draw     Port labs collected by RN.      Oncology Clinic Visit     Return - Breast Ca     Initial Vitals: /72 (BP Location: Right arm, Patient Position: Sitting)   Pulse 71   Temp 97.8  F (36.6  C) (Oral)   Resp 20   Wt 73.5 kg (162 lb)   SpO2 99%   BMI 29.63 kg/m   Estimated body mass index is 29.63 kg/m  as calculated from the following:    Height as of 8/31/19: 1.575 m (5' 2\").    Weight as of this encounter: 73.5 kg (162 lb). Body surface area is 1.79 meters squared.  No Pain (0) Comment: Data Unavailable   No LMP recorded. Patient is postmenopausal.  Allergies reviewed: Yes  Medications reviewed: Yes    Medications: Medication refills not needed today.  Pharmacy name entered into SPEEDELO: [id number] - [place], [place] - [address] AT  [address] [facility]    Clinical concerns: Lab Results and refill on Edenilson Casillas              "

## 2019-10-29 NOTE — PROGRESS NOTES
Oncology/Hematology Visit Note  Oct 29, 2019    Reason for Visit: follow up of ER positive, HER2 positive metastatic left breast cancer    History of Present Illness: Hoda Brush is a 64 year old female with ER positive, HER2 positive metastatic left breast cancer (bone).    TREATMENT HISTORY:  A. Initial diagnosis with metastatic breast cancer in [facility].  Neoadjuvant CAF x 6.   B. Left mastectomy. Left axillary node dissection.  C.  Radiation in 1 dose to R iliac region. g Gy.  C. Herceptin for 2 years taxane for a prescribed course then stopped, monthly zoledronic acid.  She had 2 years of Herceptin with tamoxifen added after chemotherapy.  D.  Tamoxifen alone and zoledronic acid every 3 months.  E.  [place]  We restarted Herceptin every 3 weeks and continued tamoxifen. Bone targeted agent changed to denosumab every 6 weeks.     She is from [facility], formerly from [facility], and [place] the [place]  She lives with her daughter and is here for continuation of every 3 week Herceptin, which she receives along with tamoxifen.  Her tumor is ER positive, ID positive, HER-2 positive.  She had metastatic disease at the time of presentation with bone-only metastases by report.  She presented with metastatic disease in 02/2014.  Staging was initially bone-only metastases by report.  She did her staging in 02/2014 that showed that she had stage IV disease, T4N2M1 invasive ductal carcinoma of the left breast.  She had a right hip metastasis.  She underwent neoadjuvant CAF, left mastectomy, left axillary lymph node dissection and radiation.  She had radiation of the right iliac region where she had metastatic disease.  Pathology report from [facility] shows the tumor to be positive for ER in 75% of the cells, positive for ID in 40% of the cells, and the HER-2 was 3+ positive by immunohistochemistry.  The Ki-67 was 20%.  She had no evidence of nonbone metastatic disease on her PET/CT scan from  08/2017.     Restaging on 7/22/19 was stable. Had acute abdominal pain and N/V and found to have cholecystitis in the ER. Had cholecystectomy on 8/31. Herceptin resumed on 9/13/19. Tamoxifen changed to letrozole on 10/8/19.    Interval History:  Hoda is here with . She started letrozole last visit. She is tolerating well. She denies any hotflashes, joint pain/stiffness, vaginal dryness. Energy is good. Mood is good. She is traveling to [facility] between 12/27-1/7/2020 to visit her mother for her mother's 90th birthday.     She takes walks with her  for exercise those not daily due to colder weather. She denies any dizziness, chest pain, dyspnea, cough. She received flu shot last visit. No infectious concerns. She had some problems with constipation after cholecystectomy but these have resolved. Appetite good. No nausea. No new bone pain.    Review of Systems:  Patient denies any of the following except if noted above: fevers, chills, palpitations, urinary concerns, headaches, rashes or skin lesions, vaginal bleeding.    Current Outpatient Medications   Medication Sig Dispense Refill     acetaminophen (TYLENOL) 500 MG tablet Take 1,000 mg by mouth every 6 hours as needed for mild pain       ibuprofen (ADVIL/MOTRIN) 600 MG tablet Take 600 mg by mouth every 6 hours as needed for moderate pain       letrozole (FEMARA) 2.5 MG tablet Take 1 tablet (2.5 mg) by mouth daily 30 tablet 0     melatonin 3 MG tablet Take 1 tablet (3 mg) by mouth nightly as needed for sleep 30 tablet 11     order for DME Equipment being ordered: 2 Mastectomy bras and 1 prosthetheses. 2 Piece 0     order for DME L UE class 2 compression sleeve and gauntlet  Night garment  Bandaging supplies 1 each 1     oxyCODONE (ROXICODONE) 5 MG tablet Take 1 tablet (5 mg) by mouth every 3 hours as needed for breakthrough pain or severe pain 12 tablet 0     SENNA-docusate sodium (SENNA S) 8.6-50 MG tablet Take 1 tablet by mouth At Bedtime Take  while taking oxycodone to prevent constipation. 24 tablet 0     tamoxifen (NOLVADEX) 20 MG tablet Take 1 tablet (20 mg) by mouth daily 90 tablet 3     VITAMIN D, CHOLECALCIFEROL, PO Take 1,000 Units by mouth daily         Physical Examination:  General: The patient is a pleasant female in no acute distress.  /72 (BP Location: Right arm, Patient Position: Sitting)   Pulse 71   Temp 97.8  F (36.6  C) (Oral)   Resp 20   Wt 73.5 kg (162 lb)   SpO2 99%   BMI 29.63 kg/m    Wt Readings from Last 10 Encounters:   10/29/19 73.5 kg (162 lb)   10/08/19 73.9 kg (162 lb 14.4 oz)   09/13/19 72.6 kg (160 lb)   09/01/19 75.5 kg (166 lb 8 oz)   08/28/19 75.8 kg (167 lb)   08/13/19 76.7 kg (169 lb 1.6 oz)   07/23/19 77.5 kg (170 lb 12.8 oz)   07/02/19 76.7 kg (169 lb 3.2 oz)   06/11/19 78.9 kg (173 lb 14.4 oz)   05/28/19 80.3 kg (177 lb)     HEENT: EOMI, PERRL. Sclerae are anicteric. Oral mucosa is pink and moist with no lesions or thrush.   Lymph: Neck is supple with no lymphadenopathy in the cervical or supraclavicular areas.   Heart: Regular rate and rhythm.   Lungs: Clear to auscultation bilaterally.   Abdomen: Bowel sounds present, soft, nontender with no palpable hepatosplenomegaly or masses.   Extremities: No lower extremity edema noted bilaterally.   Neuro: Cranial nerves II through XII are grossly intact.  Skin: No rashes, petechiae, or bruising noted on exposed skin.    Laboratory Data:  Results for NATASHA LOUIS (MRN [id number]) as of 10/29/2019 08:40   10/29/2019 08:01   Sodium 141   Potassium 3.8   Chloride 110 (H)   Carbon Dioxide 27   Urea Nitrogen 15   Creatinine 0.79   GFR Estimate 79   GFR Estimate If Black >90   Calcium 8.1 (L)   Anion Gap 4   Albumin 3.1 (L)   Protein Total 6.9   Bilirubin Total 0.3   Alkaline Phosphatase 38 (L)   ALT 28   AST 27   Glucose 90   WBC 5.4   Hemoglobin 10.7 (L)   Hematocrit 34.2 (L)   Platelet Count 204   RBC Count 3.58 (L)   MCV 96   MCH 29.9   MCHC 31.3 (L)    RDW 13.5   Diff Method Automated Method   % Neutrophils 46.3   % Lymphocytes 41.4   % Monocytes 7.5   % Eosinophils 3.9   % Basophils 0.7   % Immature Granulocytes 0.2   Nucleated RBCs 0   Absolute Neutrophil 2.5   Absolute Lymphocytes 2.3   Absolute Monocytes 0.4   Absolute Eosinophils 0.2   Absolute Basophils 0.0   Abs Immature Granulocytes 0.0   Absolute Nucleated RBC 0.0      9/13/2019 10:49 10/8/2019 08:57 10/29/2019 08:01   CA 27-29 13 <5 7      9/13/2019 10:49 10/8/2019 08:57 10/29/2019 08:01   CEA <0.5 <0.5 0.6     Assessment and Plan:    1. Metastatic breast cancer, ER, [place], HER2+ positive:   Was last treated in [facility] with Herceptin. We have continued Herceptin every 3 weeks as well as daily tamoxifen. CT CAP on 7/22/19 with stable disease.   --Last Echocardiogram on 9/13/19 with EF 57% and normal LV function; no significant change to prior.   - Started letrozole 3 weeks ago. Tolerating well without noticeable side effects.   - Tumor markers stable  -Restaging on 11/18/19  -Scheduled for labs, myself and Herceptin on 11/21. Will schedule echo, labs, KARISSA (to review echo) and Herceptin on 12/17. Labs, Dr. Aguiar and Herceptin on 1/9/19      2. Bone metastasis: On xgeva every 6 weeks. On calcium + vitamin D. Calcium WNL. Cleared by dentist to proceed. Last dose 9/13. Next dose due today. Will be due again 12/17      3. LUE lymphedema: Has been to lymphedema therapy and has compression sleeve. No recent issues.      Jossie Sparrow PA-C  [facility]  [address], [place] [id number] [phone]

## 2019-10-29 NOTE — LETTER
RE: Hoda Brush  [address] [address]       Oncology/Hematology Visit Note  Oct 29, 2019    Reason for Visit: follow up of ER positive, HER2 positive metastatic left breast cancer    History of Present Illness: Hoda Brush is a 64 year old female with ER positive, HER2 positive metastatic left breast cancer (bone).    TREATMENT HISTORY:  A. Initial diagnosis with metastatic breast cancer in [facility].  Neoadjuvant CAF x 6.   B. Left mastectomy. Left axillary node dissection.  C.  Radiation in 1 dose to R iliac region. g Gy.  C. Herceptin for 2 years taxane for a prescribed course then stopped, monthly zoledronic acid.  She had 2 years of Herceptin with tamoxifen added after chemotherapy.  D.  Tamoxifen alone and zoledronic acid every 3 months.  E.  [place]  We restarted Herceptin every 3 weeks and continued tamoxifen. Bone targeted agent changed to denosumab every 6 weeks.     She is from [facility], formerly from [facility], and came [place]  She lives with her daughter and is here for continuation of every 3 week Herceptin, which she receives along with tamoxifen.  Her tumor is ER positive, [place] positive, HER-2 positive.  She had metastatic disease at the time of presentation with bone-only metastases by report.  She presented with metastatic disease in 02/2014.  Staging was initially bone-only metastases by report.  She did her staging in 02/2014 that showed that she had stage IV disease, T4N2M1 invasive ductal carcinoma of the left breast.  She had a right hip metastasis.  She underwent neoadjuvant CAF, left mastectomy, left axillary lymph node dissection and radiation.  She had radiation of the right iliac region where she had metastatic disease.  Pathology report from [facility] shows the tumor to be positive for ER in 75% of the cells, positive for [place] in 40% of the cells, and the HER-2 was 3+ positive by immunohistochemistry.  The Ki-67 was 20%.  She had  no evidence of nonbone metastatic disease on her PET/CT scan from 08/2017.     Restaging on 7/22/19 was stable. Had acute abdominal pain and N/V and found to have cholecystitis in the ER. Had cholecystectomy on 8/31. Herceptin resumed on 9/13/19. Tamoxifen changed to letrozole on 10/8/19.    Interval History:  Hoda is here with . She started letrozole last visit. She is tolerating well. She denies any hotflashes, joint pain/stiffness, vaginal dryness. Energy is good. Mood is good. She is traveling to [facility] between 12/27-1/7/2020 to visit her mother for her mother's 90th birthday.     She takes walks with her  for exercise those not daily due to colder weather. She denies any dizziness, chest pain, dyspnea, cough. She received flu shot last visit. No infectious concerns. She had some problems with constipation after cholecystectomy but these have resolved. Appetite good. No nausea. No new bone pain.    Review of Systems:  Patient denies any of the following except if noted above: fevers, chills, palpitations, urinary concerns, headaches, rashes or skin lesions, vaginal bleeding.    Current Outpatient Medications   Medication Sig Dispense Refill     acetaminophen (TYLENOL) 500 MG tablet Take 1,000 mg by mouth every 6 hours as needed for mild pain       ibuprofen (ADVIL/MOTRIN) 600 MG tablet Take 600 mg by mouth every 6 hours as needed for moderate pain       letrozole (FEMARA) 2.5 MG tablet Take 1 tablet (2.5 mg) by mouth daily 30 tablet 0     melatonin 3 MG tablet Take 1 tablet (3 mg) by mouth nightly as needed for sleep 30 tablet 11     order for DME Equipment being ordered: 2 Mastectomy bras and 1 prosthetheses. 2 Piece 0     order for DME L UE class 2 compression sleeve and gauntlet  Night garment  Bandaging supplies 1 each 1     oxyCODONE (ROXICODONE) 5 MG tablet Take 1 tablet (5 mg) by mouth every 3 hours as needed for breakthrough pain or severe pain 12 tablet 0     SENNA-docusate sodium  (SENNA S) 8.6-50 MG tablet Take 1 tablet by mouth At Bedtime Take while taking oxycodone to prevent constipation. 24 tablet 0     tamoxifen (NOLVADEX) 20 MG tablet Take 1 tablet (20 mg) by mouth daily 90 tablet 3     VITAMIN D, CHOLECALCIFEROL, PO Take 1,000 Units by mouth daily         Physical Examination:  General: The patient is a pleasant female in no acute distress.  /72 (BP Location: Right arm, Patient Position: Sitting)   Pulse 71   Temp 97.8  F (36.6  C) (Oral)   Resp 20   Wt 73.5 kg (162 lb)   SpO2 99%   BMI 29.63 kg/m     Wt Readings from Last 10 Encounters:   10/29/19 73.5 kg (162 lb)   10/08/19 73.9 kg (162 lb 14.4 oz)   09/13/19 72.6 kg (160 lb)   09/01/19 75.5 kg (166 lb 8 oz)   08/28/19 75.8 kg (167 lb)   08/13/19 76.7 kg (169 lb 1.6 oz)   07/23/19 77.5 kg (170 lb 12.8 oz)   07/02/19 76.7 kg (169 lb 3.2 oz)   06/11/19 78.9 kg (173 lb 14.4 oz)   05/28/19 80.3 kg (177 lb)     HEENT: EOMI, PERRL. Sclerae are anicteric. Oral mucosa is pink and moist with no lesions or thrush.   Lymph: Neck is supple with no lymphadenopathy in the cervical or supraclavicular areas.   Heart: Regular rate and rhythm.   Lungs: Clear to auscultation bilaterally.   Abdomen: Bowel sounds present, soft, nontender with no palpable hepatosplenomegaly or masses.   Extremities: No lower extremity edema noted bilaterally.   Neuro: Cranial nerves II through XII are grossly intact.  Skin: No rashes, petechiae, or bruising noted on exposed skin.    Laboratory Data:  Results for NATASHA LOUIS (MRN [id number]) as of 10/29/2019 08:40   10/29/2019 08:01   Sodium 141   Potassium 3.8   Chloride 110 (H)   Carbon Dioxide 27   Urea Nitrogen 15   Creatinine 0.79   GFR Estimate 79   GFR Estimate If Black >90   Calcium 8.1 (L)   Anion Gap 4   Albumin 3.1 (L)   Protein Total 6.9   Bilirubin Total 0.3   Alkaline Phosphatase 38 (L)   ALT 28   AST 27   Glucose 90   WBC 5.4   Hemoglobin 10.7 (L)   Hematocrit 34.2 (L)   Platelet  Count 204   RBC Count 3.58 (L)   MCV 96   MCH 29.9   MCHC 31.3 (L)   RDW 13.5   Diff Method Automated Method   % Neutrophils 46.3   % Lymphocytes 41.4   % Monocytes 7.5   % Eosinophils 3.9   % Basophils 0.7   % Immature Granulocytes 0.2   Nucleated RBCs 0   Absolute Neutrophil 2.5   Absolute Lymphocytes 2.3   Absolute Monocytes 0.4   Absolute Eosinophils 0.2   Absolute Basophils 0.0   Abs Immature Granulocytes 0.0   Absolute Nucleated RBC 0.0      9/13/2019 10:49 10/8/2019 08:57 10/29/2019 08:01   CA 27-29 13 <5 7      9/13/2019 10:49 10/8/2019 08:57 10/29/2019 08:01   CEA <0.5 <0.5 0.6     Assessment and Plan:    1. Metastatic breast cancer, ER, [place], HER2+ positive:   Was last treated in [facility] with Herceptin. We have continued Herceptin every 3 weeks as well as daily tamoxifen. CT CAP on 7/22/19 with stable disease.   --Last Echocardiogram on 9/13/19 with EF 57% and normal LV function; no significant change to prior.   - Started letrozole 3 weeks ago. Tolerating well without noticeable side effects.   - Tumor markers stable  -Restaging on 11/18/19  -Scheduled for labs, myself and Herceptin on 11/21. Will schedule echo, labs, KARISSA (to review echo) and Herceptin on 12/17. Labs, Dr. Aguiar and Herceptin on 1/9/19      2. Bone metastasis: On xgeva every 6 weeks. On calcium + vitamin D. Calcium WNL. Cleared by dentist to proceed. Last dose 9/13. Next dose due today. Will be due again 12/17      3. LUE lymphedema: Has been to lymphedema therapy and has compression sleeve. No recent issues.      Jossie Sparrow PA-C  [facility]  [address], [place] [id number] [phone]

## 2019-11-18 ENCOUNTER — ANCILLARY PROCEDURE (OUTPATIENT)
Dept: CT IMAGING | Facility: CLINIC | Age: 64
End: 2019-11-18
Attending: INTERNAL MEDICINE
Payer: COMMERCIAL

## 2019-11-18 DIAGNOSIS — C50.912 MALIGNANT NEOPLASM OF LEFT FEMALE BREAST, UNSPECIFIED ESTROGEN RECEPTOR STATUS, UNSPECIFIED SITE OF BREAST (H): ICD-10-CM

## 2019-11-18 RX ORDER — IOPAMIDOL 755 MG/ML
99 INJECTION, SOLUTION INTRAVASCULAR ONCE
Status: COMPLETED | OUTPATIENT
Start: 2019-11-18 | End: 2019-11-18

## 2019-11-18 RX ORDER — HEPARIN SODIUM (PORCINE) LOCK FLUSH IV SOLN 100 UNIT/ML 100 UNIT/ML
5 SOLUTION INTRAVENOUS ONCE
Status: COMPLETED | OUTPATIENT
Start: 2019-11-18 | End: 2019-11-18

## 2019-11-18 RX ADMIN — HEPARIN SODIUM (PORCINE) LOCK FLUSH IV SOLN 100 UNIT/ML 5 ML: 100 SOLUTION at 12:52

## 2019-11-18 RX ADMIN — IOPAMIDOL 99 ML: 755 INJECTION, SOLUTION INTRAVASCULAR at 12:45

## 2019-11-19 ENCOUNTER — TELEPHONE (OUTPATIENT)
Dept: ONCOLOGY | Facility: CLINIC | Age: 64
End: 2019-11-19

## 2019-11-21 ENCOUNTER — APPOINTMENT (OUTPATIENT)
Dept: LAB | Facility: CLINIC | Age: 64
End: 2019-11-21
Attending: INTERNAL MEDICINE
Payer: COMMERCIAL

## 2019-11-21 ENCOUNTER — ONCOLOGY VISIT (OUTPATIENT)
Dept: ONCOLOGY | Facility: CLINIC | Age: 64
End: 2019-11-21
Attending: PHYSICIAN ASSISTANT
Payer: COMMERCIAL

## 2019-11-21 ENCOUNTER — INFUSION THERAPY VISIT (OUTPATIENT)
Dept: ONCOLOGY | Facility: CLINIC | Age: 64
End: 2019-11-21
Attending: INTERNAL MEDICINE
Payer: COMMERCIAL

## 2019-11-21 VITALS
HEART RATE: 72 BPM | OXYGEN SATURATION: 98 % | RESPIRATION RATE: 20 BRPM | TEMPERATURE: 98.2 F | SYSTOLIC BLOOD PRESSURE: 111 MMHG | BODY MASS INDEX: 29.43 KG/M2 | WEIGHT: 160.9 LBS | DIASTOLIC BLOOD PRESSURE: 60 MMHG

## 2019-11-21 DIAGNOSIS — C50.912 MALIGNANT NEOPLASM OF LEFT FEMALE BREAST, UNSPECIFIED ESTROGEN RECEPTOR STATUS, UNSPECIFIED SITE OF BREAST (H): Primary | ICD-10-CM

## 2019-11-21 LAB
ALBUMIN SERPL-MCNC: 3.3 G/DL (ref 3.4–5)
ALP SERPL-CCNC: 43 U/L (ref 40–150)
ALT SERPL W P-5'-P-CCNC: 29 U/L (ref 0–50)
ANION GAP SERPL CALCULATED.3IONS-SCNC: 5 MMOL/L (ref 3–14)
AST SERPL W P-5'-P-CCNC: 19 U/L (ref 0–45)
BASOPHILS # BLD AUTO: 0 10E9/L (ref 0–0.2)
BASOPHILS NFR BLD AUTO: 0.5 %
BILIRUB SERPL-MCNC: 0.2 MG/DL (ref 0.2–1.3)
BUN SERPL-MCNC: 13 MG/DL (ref 7–30)
CALCIUM SERPL-MCNC: 8.7 MG/DL (ref 8.5–10.1)
CANCER AG27-29 SERPL-ACNC: 11 U/ML (ref 0–39)
CEA SERPL-MCNC: <0.5 UG/L (ref 0–2.5)
CHLORIDE SERPL-SCNC: 107 MMOL/L (ref 94–109)
CO2 SERPL-SCNC: 27 MMOL/L (ref 20–32)
CREAT SERPL-MCNC: 0.74 MG/DL (ref 0.52–1.04)
DIFFERENTIAL METHOD BLD: ABNORMAL
EOSINOPHIL # BLD AUTO: 0.2 10E9/L (ref 0–0.7)
EOSINOPHIL NFR BLD AUTO: 2.9 %
ERYTHROCYTE [DISTWIDTH] IN BLOOD BY AUTOMATED COUNT: 14.2 % (ref 10–15)
GFR SERPL CREATININE-BSD FRML MDRD: 86 ML/MIN/{1.73_M2}
GLUCOSE SERPL-MCNC: 78 MG/DL (ref 70–99)
HCT VFR BLD AUTO: 37.4 % (ref 35–47)
HGB BLD-MCNC: 11.4 G/DL (ref 11.7–15.7)
IMM GRANULOCYTES # BLD: 0 10E9/L (ref 0–0.4)
IMM GRANULOCYTES NFR BLD: 0.2 %
LYMPHOCYTES # BLD AUTO: 2.9 10E9/L (ref 0.8–5.3)
LYMPHOCYTES NFR BLD AUTO: 34.9 %
MCH RBC QN AUTO: 29.3 PG (ref 26.5–33)
MCHC RBC AUTO-ENTMCNC: 30.5 G/DL (ref 31.5–36.5)
MCV RBC AUTO: 96 FL (ref 78–100)
MONOCYTES # BLD AUTO: 0.5 10E9/L (ref 0–1.3)
MONOCYTES NFR BLD AUTO: 6.1 %
NEUTROPHILS # BLD AUTO: 4.6 10E9/L (ref 1.6–8.3)
NEUTROPHILS NFR BLD AUTO: 55.4 %
NRBC # BLD AUTO: 0 10*3/UL
NRBC BLD AUTO-RTO: 0 /100
PLATELET # BLD AUTO: 238 10E9/L (ref 150–450)
POTASSIUM SERPL-SCNC: 4 MMOL/L (ref 3.4–5.3)
PROT SERPL-MCNC: 7.6 G/DL (ref 6.8–8.8)
RBC # BLD AUTO: 3.89 10E12/L (ref 3.8–5.2)
SODIUM SERPL-SCNC: 139 MMOL/L (ref 133–144)
WBC # BLD AUTO: 8.3 10E9/L (ref 4–11)

## 2019-11-21 PROCEDURE — 80053 COMPREHEN METABOLIC PANEL: CPT | Performed by: INTERNAL MEDICINE

## 2019-11-21 PROCEDURE — G0463 HOSPITAL OUTPT CLINIC VISIT: HCPCS | Mod: ZF

## 2019-11-21 PROCEDURE — 86300 IMMUNOASSAY TUMOR CA 15-3: CPT | Performed by: INTERNAL MEDICINE

## 2019-11-21 PROCEDURE — 82378 CARCINOEMBRYONIC ANTIGEN: CPT | Performed by: INTERNAL MEDICINE

## 2019-11-21 PROCEDURE — 85025 COMPLETE CBC W/AUTO DIFF WBC: CPT | Performed by: INTERNAL MEDICINE

## 2019-11-21 PROCEDURE — 25800030 ZZH RX IP 258 OP 636: Mod: ZF | Performed by: PHYSICIAN ASSISTANT

## 2019-11-21 PROCEDURE — 99214 OFFICE O/P EST MOD 30 MIN: CPT | Mod: ZP | Performed by: PHYSICIAN ASSISTANT

## 2019-11-21 PROCEDURE — 25000128 H RX IP 250 OP 636: Mod: ZF | Performed by: PHYSICIAN ASSISTANT

## 2019-11-21 PROCEDURE — 96413 CHEMO IV INFUSION 1 HR: CPT

## 2019-11-21 RX ORDER — HEPARIN SODIUM (PORCINE) LOCK FLUSH IV SOLN 100 UNIT/ML 100 UNIT/ML
5 SOLUTION INTRAVENOUS EVERY 8 HOURS
Status: CANCELLED | OUTPATIENT
Start: 2019-11-21

## 2019-11-21 RX ORDER — DIPHENHYDRAMINE HCL 25 MG
50 CAPSULE ORAL ONCE
Status: CANCELLED
Start: 2019-11-21

## 2019-11-21 RX ORDER — HEPARIN SODIUM (PORCINE) LOCK FLUSH IV SOLN 100 UNIT/ML 100 UNIT/ML
5 SOLUTION INTRAVENOUS EVERY 8 HOURS
Status: DISCONTINUED | OUTPATIENT
Start: 2019-11-21 | End: 2019-11-21 | Stop reason: HOSPADM

## 2019-11-21 RX ORDER — ALBUTEROL SULFATE 0.83 MG/ML
2.5 SOLUTION RESPIRATORY (INHALATION)
Status: CANCELLED | OUTPATIENT
Start: 2019-11-21

## 2019-11-21 RX ORDER — EPINEPHRINE 1 MG/ML
0.3 INJECTION, SOLUTION INTRAMUSCULAR; SUBCUTANEOUS EVERY 5 MIN PRN
Status: CANCELLED | OUTPATIENT
Start: 2019-11-21

## 2019-11-21 RX ORDER — LORAZEPAM 2 MG/ML
0.5 INJECTION INTRAMUSCULAR EVERY 4 HOURS PRN
Status: CANCELLED
Start: 2019-11-21

## 2019-11-21 RX ORDER — SODIUM CHLORIDE 9 MG/ML
1000 INJECTION, SOLUTION INTRAVENOUS CONTINUOUS PRN
Status: CANCELLED
Start: 2019-11-21

## 2019-11-21 RX ORDER — METHYLPREDNISOLONE SODIUM SUCCINATE 125 MG/2ML
125 INJECTION, POWDER, LYOPHILIZED, FOR SOLUTION INTRAMUSCULAR; INTRAVENOUS
Status: CANCELLED
Start: 2019-11-21

## 2019-11-21 RX ORDER — EPINEPHRINE 0.3 MG/.3ML
0.3 INJECTION SUBCUTANEOUS EVERY 5 MIN PRN
Status: CANCELLED | OUTPATIENT
Start: 2019-11-21

## 2019-11-21 RX ORDER — MEPERIDINE HYDROCHLORIDE 25 MG/ML
25 INJECTION INTRAMUSCULAR; INTRAVENOUS; SUBCUTANEOUS EVERY 30 MIN PRN
Status: CANCELLED | OUTPATIENT
Start: 2019-11-21

## 2019-11-21 RX ORDER — DIPHENHYDRAMINE HYDROCHLORIDE 50 MG/ML
50 INJECTION INTRAMUSCULAR; INTRAVENOUS
Status: CANCELLED
Start: 2019-11-21

## 2019-11-21 RX ORDER — ALBUTEROL SULFATE 90 UG/1
1-2 AEROSOL, METERED RESPIRATORY (INHALATION)
Status: CANCELLED
Start: 2019-11-21

## 2019-11-21 RX ORDER — ACETAMINOPHEN 325 MG/1
650 TABLET ORAL
Status: CANCELLED | OUTPATIENT
Start: 2019-11-21

## 2019-11-21 RX ADMIN — TRASTUZUMAB 450 MG: 150 INJECTION, POWDER, LYOPHILIZED, FOR SOLUTION INTRAVENOUS at 15:29

## 2019-11-21 RX ADMIN — HEPARIN SODIUM (PORCINE) LOCK FLUSH IV SOLN 100 UNIT/ML 5 ML: 100 SOLUTION at 13:25

## 2019-11-21 RX ADMIN — SODIUM CHLORIDE 250 ML: 9 INJECTION, SOLUTION INTRAVENOUS at 15:07

## 2019-11-21 RX ADMIN — HEPARIN 5 ML: 100 SYRINGE at 16:03

## 2019-11-21 ASSESSMENT — PAIN SCALES - GENERAL: PAINLEVEL: NO PAIN (0)

## 2019-11-21 NOTE — LETTER
11/21/2019       RE: Hoda Brush  [address] [facility] [address]     Dear Colleague,    Thank you for referring your patient, Hoda Brush, to the [facility]. Please see a copy of my visit note below.    Oncology/Hematology Visit Note  Nov 21, 2019    Reason for Visit: follow up of ER positive, HER2 positive metastatic left breast cancer    History of Present Illness: Hoda Brush is a 64 year old female with ER positive, HER2 positive metastatic left breast cancer (bone).    TREATMENT HISTORY:  A. Initial diagnosis with metastatic breast cancer in [facility].  Neoadjuvant CAF x 6.   B. Left mastectomy. Left axillary node dissection.  C.  Radiation in 1 dose to R iliac region. g Gy.  C. Herceptin for 2 years taxane for a prescribed course then stopped, monthly zoledronic acid.  She had 2 years of Herceptin with tamoxifen added after chemotherapy.  D.  Tamoxifen alone and zoledronic acid every 3 months.  E.  [place]  We restarted Herceptin every 3 weeks and continued tamoxifen. Bone targeted agent changed to denosumab every 6 weeks.     She is from [facility], formerly from [facility]  She lives with her daughter and is here for continuation of every 3 week Herceptin, which she receives along with tamoxifen.  Her tumor is ER positive, AK positive, HER-2 positive.  She had metastatic disease at the time of presentation with bone-only metastases by report.  She presented with metastatic disease in 02/2014.  Staging was initially bone-only metastases by report.  She did her staging in 02/2014 that showed that she had stage IV disease, [id number] invasive ductal carcinoma of the left breast.  She had a right hip metastasis.  She underwent neoadjuvant CAF, left mastectomy, left axillary lymph node dissection and radiation.  She had radiation of the right iliac region where she had metastatic disease.  Pathology report from [facility] shows  the tumor to be positive for ER in 75% of the cells, positive for NJ in 40% of the cells, and the HER-2 was 3+ positive by immunohistochemistry.  The Ki-67 was 20%.  She had no evidence of nonbone metastatic disease on her PET/CT scan from 08/2017.     Restaging on 7/22/19 was stable. Had acute abdominal pain and N/V and found to have cholecystitis in the ER. Had cholecystectomy on 8/31. Herceptin resumed on 9/13/19. Tamoxifen changed to letrozole on 10/8/19.    Interval History:  Hoda is here with . She continues on letrozole and she is tolerating well. She denies any hotflashes, joint pain/stiffness, vaginal dryness. Energy is good. Mood is good. She is traveling to [facility] on 1/10  to visit her mother for her mother's 90th birthday. She would like her 1/9 appointments changed to 1/7 as she states she has fatigue the day following Herceptin.     She takes walks with her  for exercise those not daily due to colder weather. She denies any dizziness, chest pain, dyspnea, cough. No infectious concerns. She had some problems with constipation after cholecystectomy but these have resolved. Appetite good. No nausea. No new bone pain.    Review of Systems:  Patient denies any of the following except if noted above: fevers, chills, palpitations, urinary concerns, headaches, rashes or skin lesions, leg swelling.    Current Outpatient Medications   Medication Sig Dispense Refill     acetaminophen (TYLENOL) 500 MG tablet Take 1,000 mg by mouth every 6 hours as needed for mild pain       ibuprofen (ADVIL/MOTRIN) 600 MG tablet Take 600 mg by mouth every 6 hours as needed for moderate pain       letrozole (FEMARA) 2.5 MG tablet Take 1 tablet (2.5 mg) by mouth daily 90 tablet 3     melatonin 3 MG tablet Take 1 tablet (3 mg) by mouth nightly as needed for sleep 30 tablet 11     order for DME Equipment being ordered: 2 Mastectomy bras and 1 prosthetheses. 2 Piece 0     order for DME L UE class 2 compression  sleeve and gauntlet  Night garment  Bandaging supplies 1 each 1     oxyCODONE (ROXICODONE) 5 MG tablet Take 1 tablet (5 mg) by mouth every 3 hours as needed for breakthrough pain or severe pain (Patient not taking: Reported on 10/29/2019) 12 tablet 0     SENNA-docusate sodium (SENNA S) 8.6-50 MG tablet Take 1 tablet by mouth At Bedtime Take while taking oxycodone to prevent constipation. (Patient not taking: Reported on 10/29/2019) 24 tablet 0     tamoxifen (NOLVADEX) 20 MG tablet Take 1 tablet (20 mg) by mouth daily (Patient not taking: Reported on 10/29/2019) 90 tablet 3     VITAMIN D, CHOLECALCIFEROL, PO Take 1,000 Units by mouth daily         Physical Examination:  General: The patient is a pleasant female in no acute distress.  There were no vitals taken for this visit.  Wt Readings from Last 10 Encounters:   10/29/19 73.5 kg (162 lb)   10/08/19 73.9 kg (162 lb 14.4 oz)   09/13/19 72.6 kg (160 lb)   09/01/19 75.5 kg (166 lb 8 oz)   08/28/19 75.8 kg (167 lb)   08/13/19 76.7 kg (169 lb 1.6 oz)   07/23/19 77.5 kg (170 lb 12.8 oz)   07/02/19 76.7 kg (169 lb 3.2 oz)   06/11/19 78.9 kg (173 lb 14.4 oz)   05/28/19 80.3 kg (177 lb)     HEENT: EOMI, PERRL. Sclerae are anicteric. Oral mucosa is pink and moist with no lesions or thrush.   Lymph: Neck is supple with no lymphadenopathy in the cervical or supraclavicular areas.   Heart: Regular rate and rhythm.   Lungs: Clear to auscultation bilaterally.   Abdomen: Bowel sounds present, soft, nontender with no palpable hepatosplenomegaly or masses.   Extremities: No lower extremity edema noted bilaterally.   Neuro: Cranial nerves II through XII are grossly intact.  Skin: No rashes, petechiae, or bruising noted on exposed skin.    Laboratory Data:  Results for NATASHA LOUIS (MRN [id number]) as of 11/21/2019 14:34   11/21/2019 13:25   Sodium 139   Potassium 4.0   Chloride 107   Carbon Dioxide 27   Urea Nitrogen 13   Creatinine 0.74   GFR Estimate 86   GFR Estimate If  Black >90   Calcium 8.7   Anion Gap 5   Albumin 3.3 (L)   Protein Total 7.6   Bilirubin Total 0.2   Alkaline Phosphatase 43   ALT 29   AST 19   Glucose 78   WBC 8.3   Hemoglobin 11.4 (L)   Hematocrit 37.4   Platelet Count 238   RBC Count 3.89   MCV 96   MCH 29.3   MCHC 30.5 (L)   RDW 14.2   Diff Method Automated Method   % Neutrophils 55.4   % Lymphocytes 34.9   % Monocytes 6.1   % Eosinophils 2.9   % Basophils 0.5   % Immature Granulocytes 0.2   Nucleated RBCs 0   Absolute Neutrophil 4.6   Absolute Lymphocytes 2.9   Absolute Monocytes 0.5   Absolute Eosinophils 0.2   Absolute Basophils 0.0   Abs Immature Granulocytes 0.0   Absolute Nucleated RBC 0.0     Results for NATASHA LOUIS (MRN [id number]) as of 11/21/2019 17:35   10/8/2019 08:57 10/29/2019 08:01 11/18/2019 12:56 11/21/2019 13:25   CA 27-29 <5 7  11   Results for NATASHA LOUIS (MRN [id number]) as of 11/21/2019 17:35   10/8/2019 08:57 10/29/2019 08:01 11/18/2019 12:56 11/21/2019 13:25   CEA <0.5 0.6  <0.5       Imaging:  CT CHEST/ABDOMEN/PELVIS WITH CONTRAST  November 18, 2019 12:56 PM     HISTORY: Invasive breast cancer, stage IV, post chemotherapy, assess  therapy response. Malignant neoplasm of left female breast,  unspecified estrogen receptor status, unspecified site of breast (H).     TECHNIQUE: CT scan obtained of the chest, abdomen, and pelvis with  oral and IV contrast. Isovue 370 99mL IV injected. Radiation dose for  this scan was reduced using automated exposure control, adjustment of  the mA and/or kV according to patient size, or iterative  reconstruction technique.     COMPARISON: CT chest, abdomen, and pelvis 8/31/2019.     FINDINGS:  Chest: Stable right chest port venous catheter, its tip at the  cavoatrial level. No new adenopathy identified within the chest. Some  small scarring at the left axilla appears stable. Left mastectomy. No  acute mediastinal abnormality. No effusion.     Stable 0.4 cm nodule or scar posterior left  upper lobe series 8 image  77. Medial left lower lobe nodule continues to measure 0.6 cm series 8  image 205. Stable tiny nodule right major fissure series 8 image 63.  No new airspace disease identified.     Abdomen/pelvis: Cholecystectomy. Fluid collection at the operative  site tiny bubbles of gas noted series 6 image 55. The fluid collection  is 3.1 x 2.0 cm series 6 image 56. No biliary dilatation. No focal  hepatic abnormality. The adrenals, spleen, pancreas, and kidneys are  stable with no new significant abnormality. Fibroid uterus again  identified appearing grossly stable. Example fibroid towards the right  continues to measure 3.3 cm series 6 image 110. No new enlarging lymph  nodes seen within the abdomen or pelvis. No acute bowel abnormality  identified.     Bone windows of the chest, abdomen, and pelvis again shows multifocal  bony metastatic disease at several locations within the spine and  pelvis appearing stable.                                                                      IMPRESSION:   1. Stable exam without evidence for new disease.  2. Stable bony metastatic disease.  3. Stable pulmonary nodules that are nonspecific. Recommend  surveillance imaging.  4. Interval cholecystectomy. Focal fluid collection with tiny bubbles  of gas at the cholecystectomy site.     RAFA EWING MD    Assessment and Plan:    1. Metastatic breast cancer, ER, AK, HER2+ positive:   Was last treated in [facility] with Herceptin. We have continued Herceptin every 3 weeks as well as daily tamoxifen. CT CAP on 11/18/19 with stable disease.   --Last Echocardiogram on 9/13/19 with EF 57% and normal LV function; no significant change to prior.   - Started letrozole ~10/8. Tolerating well without noticeable side effects.   - Tumor markers stable  -Scheduled for echo, labs, KARISSA (to review echo) and Herceptin on 12/17. Will schedule labs, Herceptin on 1/7/2020. Reschedule labs, Dr. Aguiar and Herceptin to 1/30.     2. Bone  metastasis: On xgeva every 6 weeks. On calcium + vitamin D. Calcium WNL. Cleared by dentist to proceed. Last dose 10/29. Could consider changing to every 3 months.      3. LUE lymphedema: Has been to lymphedema therapy and has compression sleeve. No recent issues.      Jossie Sparrow PA-C  [facility] [id number] [place] [address]  [phone]      Again, thank you for allowing me to participate in the care of your patient.      Sincerely,    SEMAJ Arnold

## 2019-11-21 NOTE — PROGRESS NOTES
Infusion Nursing Note:  Hoda Brush presents today for Day 1 Cycle 38 Herceptin.    Patient seen by provider today: Yes: SEMAJ Escobar   present during visit today: Yes, Language: Afghan.     Note: N/A.    Intravenous Access:  Implanted Port.    Treatment Conditions:  Lab Results   Component Value Date    HGB 11.4 11/21/2019     Lab Results   Component Value Date    WBC 8.3 11/21/2019      Lab Results   Component Value Date    ANEU 4.6 11/21/2019     Lab Results   Component Value Date     11/21/2019      Lab Results   Component Value Date     11/21/2019                   Lab Results   Component Value Date    POTASSIUM 4.0 11/21/2019           No results found for: MAG         Lab Results   Component Value Date    CR 0.74 11/21/2019                   Lab Results   Component Value Date    TAYLER 8.7 11/21/2019                Lab Results   Component Value Date    BILITOTAL 0.2 11/21/2019           Lab Results   Component Value Date    ALBUMIN 3.3 11/21/2019                    Lab Results   Component Value Date    ALT 29 11/21/2019           Lab Results   Component Value Date    AST 19 11/21/2019       Results reviewed, labs MET treatment parameters, ok to proceed with treatment.  ECHO/MUGA completed 9/13/19  EF 57%.    Post Infusion Assessment:  Patient tolerated infusion without incident.  Blood return noted pre and post infusion.  Access discontinued per protocol.     Discharge Plan:   Patient declined prescription refills.  AVS to patient via [employer].  Patient will return 12/17 for next appointment.   Patient discharged in stable condition accompanied by: self.  Departure Mode: Ambulatory.    Alicia Jean RN

## 2019-11-21 NOTE — PATIENT INSTRUCTIONS
Contact Numbers  [facility] [place]: [phone]  [facility] NurseTriage and After Hours:  [phone]    Call triage with chills and/or temperature greater than or equal to 100.5, uncontrolled nausea/vomiting, diarrhea, constipation, dizziness, shortness of breath, chest pain, bleeding, unexplained bruising, or any new/concerning symptoms, questions/concerns.     If after hours, weekends, or holidays, call the nurse triage number. Calls may be forwarded to the hospital , please ask for the adult oncology doctor on call.     If you are having any concerning symptoms or wish to speak to a provider before your next infusion visit, please call your care coordinator or triage to notify them so we can adequately serve you.     If you need a refill on a narcotic prescription, please call triage or your care coordinator before your infusion appointment.           November 2019 Sunday Monday Tuesday Wednesday Thursday Friday Saturday                            1     2       3     4     5     6     7     8     9       10     11     12     13     14     15     16       17     18    CT CHEST/ABDOMEN/PELVIS W  12:45 PM   (60 min.)   [id number]   [facility] CT 19     20     21    [facility] DRAW   1:30 PM   (15 min.)    [facility] DRAW   [facility] Lab Draw    [facility] RETURN   1:55 PM   (90 min.)   Jossie Sparrow PA   [facility]   3:00 PM   (60 min.)    ONCOLOGY INFUSION   [facility] 22     23       24     25     26     27     28     29     30                 December 2019 Sunday Monday Tuesday Wednesday Thursday Friday Saturday   1     2     3     4     5     6     7       8     9     10     11     12     13     14       15     16     17    ECHO COMPLETE  11:00 AM   (60 min.)   [id number]   [facility]    P [facility] DRAW  12:30 PM   (15 min.)    [facility]    [facility] RETURN  12:45 PM    (50 min.)   Anne Lea PA-C   [facility]    [facility] ONC INFUSION 60   2:00 PM   (60 min.)   UC ONCOLOGY INFUSION   [facility] 18     19     20     21       22     23     24     25     26     27     28       29     30     31                                         Lab Results:  Recent Results (from the past 12 hour(s))   CBC with platelets differential    Collection Time: 11/21/19  1:25 PM   Result Value Ref Range    WBC 8.3 4.0 - 11.0 10e9/L    RBC Count 3.89 3.8 - 5.2 10e12/L    Hemoglobin 11.4 (L) 11.7 - 15.7 g/dL    Hematocrit 37.4 35.0 - 47.0 %    MCV 96 78 - 100 fl    MCH 29.3 26.5 - 33.0 pg    MCHC 30.5 (L) 31.5 - 36.5 g/dL    RDW 14.2 10.0 - 15.0 %    Platelet Count 238 150 - 450 10e9/L    Diff Method Automated Method     % Neutrophils 55.4 %    % Lymphocytes 34.9 %    % Monocytes 6.1 %    % Eosinophils 2.9 %    % Basophils 0.5 %    % Immature Granulocytes 0.2 %    Nucleated RBCs 0 0 /100    Absolute Neutrophil 4.6 1.6 - 8.3 10e9/L    Absolute Lymphocytes 2.9 0.8 - 5.3 10e9/L    Absolute Monocytes 0.5 0.0 - 1.3 10e9/L    Absolute Eosinophils 0.2 0.0 - 0.7 10e9/L    Absolute Basophils 0.0 0.0 - 0.2 10e9/L    Abs Immature Granulocytes 0.0 0 - 0.4 10e9/L    Absolute Nucleated RBC 0.0    Comprehensive metabolic panel    Collection Time: 11/21/19  1:25 PM   Result Value Ref Range    Sodium 139 133 - 144 mmol/L    Potassium 4.0 3.4 - 5.3 mmol/L    Chloride 107 94 - 109 mmol/L    Carbon Dioxide 27 20 - 32 mmol/L    Anion Gap 5 3 - 14 mmol/L    Glucose 78 70 - 99 mg/dL    Urea Nitrogen 13 7 - 30 mg/dL    Creatinine 0.74 0.52 - 1.04 mg/dL    GFR Estimate 86 >60 mL/min/[1.73_m2]    GFR Estimate If Black >90 >60 mL/min/[1.73_m2]    Calcium 8.7 8.5 - 10.1 mg/dL    Bilirubin Total 0.2 0.2 - 1.3 mg/dL    Albumin 3.3 (L) 3.4 - 5.0 g/dL    Protein Total 7.6 6.8 - 8.8 g/dL    Alkaline Phosphatase 43 40 - 150 U/L    ALT 29 0 - 50 U/L    AST 19 0 - 45 U/L

## 2019-11-21 NOTE — PROGRESS NOTES
Oncology/Hematology Visit Note  Nov 21, 2019    Reason for Visit: follow up of ER positive, HER2 positive metastatic left breast cancer    History of Present Illness: Hoda Brush is a 64 year old female with ER positive, HER2 positive metastatic left breast cancer (bone).    TREATMENT HISTORY:  A. Initial diagnosis with metastatic breast cancer in [facility].  Neoadjuvant CAF x 6.   B. Left mastectomy. Left axillary node dissection.  C.  Radiation in 1 dose to R iliac region. g Gy.  C. Herceptin for 2 years taxane for a prescribed course then stopped, monthly zoledronic acid.  She had 2 years of Herceptin with tamoxifen added after chemotherapy.  D.  Tamoxifen alone and zoledronic acid every 3 months.  E.  [place]  We restarted Herceptin every 3 weeks and continued tamoxifen. Bone targeted agent changed to denosumab every 6 weeks.     She is from [facility], formerly from [facility], [place]  She lives with her daughter and is here for continuation of every 3 week Herceptin, which she receives along with tamoxifen.  Her tumor is ER positive, [place] positive, HER-2 positive.  She had metastatic disease at the time of presentation with bone-only metastases by report.  She presented with metastatic disease in 02/2014.  Staging was initially bone-only metastases by report.  She did her staging in 02/2014 that showed that she had stage IV disease, T4N2M1 invasive ductal carcinoma of the left breast.  She had a right hip metastasis.  She underwent neoadjuvant CAF, left mastectomy, left axillary lymph node dissection and radiation.  She had radiation of the right iliac region where she had metastatic disease.  Pathology report from [facility] shows the tumor to be positive for ER in 75% of the cells, positive for [place] in 40% of the cells, and the HER-2 was 3+ positive by immunohistochemistry.  The Ki-67 was 20%.  She had no evidence of nonbone metastatic disease on her PET/CT scan from  08/2017.     Restaging on 7/22/19 was stable. Had acute abdominal pain and N/V and found to have cholecystitis in the ER. Had cholecystectomy on 8/31. Herceptin resumed on 9/13/19. Tamoxifen changed to letrozole on 10/8/19.    Interval History:  Hoda is here with . She continues on letrozole and she is tolerating well. She denies any hotflashes, joint pain/stiffness, vaginal dryness. Energy is good. Mood is good. She is traveling to [facility] on 1/10  to visit her mother for her mother's 90th birthday. She would like her 1/9 appointments changed to 1/7 as she states she has fatigue the day following Herceptin.     She takes walks with her  for exercise those not daily due to colder weather. She denies any dizziness, chest pain, dyspnea, cough. No infectious concerns. She had some problems with constipation after cholecystectomy but these have resolved. Appetite good. No nausea. No new bone pain.    Review of Systems:  Patient denies any of the following except if noted above: fevers, chills, palpitations, urinary concerns, headaches, rashes or skin lesions, leg swelling.    Current Outpatient Medications   Medication Sig Dispense Refill     acetaminophen (TYLENOL) 500 MG tablet Take 1,000 mg by mouth every 6 hours as needed for mild pain       ibuprofen (ADVIL/MOTRIN) 600 MG tablet Take 600 mg by mouth every 6 hours as needed for moderate pain       letrozole (FEMARA) 2.5 MG tablet Take 1 tablet (2.5 mg) by mouth daily 90 tablet 3     melatonin 3 MG tablet Take 1 tablet (3 mg) by mouth nightly as needed for sleep 30 tablet 11     order for DME Equipment being ordered: 2 Mastectomy bras and 1 prosthetheses. 2 Piece 0     order for DME L UE class 2 compression sleeve and gauntlet  Night garment  Bandaging supplies 1 each 1     oxyCODONE (ROXICODONE) 5 MG tablet Take 1 tablet (5 mg) by mouth every 3 hours as needed for breakthrough pain or severe pain (Patient not taking: Reported on 10/29/2019) 12  tablet 0     SENNA-docusate sodium (SENNA S) 8.6-50 MG tablet Take 1 tablet by mouth At Bedtime Take while taking oxycodone to prevent constipation. (Patient not taking: Reported on 10/29/2019) 24 tablet 0     tamoxifen (NOLVADEX) 20 MG tablet Take 1 tablet (20 mg) by mouth daily (Patient not taking: Reported on 10/29/2019) 90 tablet 3     VITAMIN D, CHOLECALCIFEROL, PO Take 1,000 Units by mouth daily         Physical Examination:  General: The patient is a pleasant female in no acute distress.  There were no vitals taken for this visit.  Wt Readings from Last 10 Encounters:   10/29/19 73.5 kg (162 lb)   10/08/19 73.9 kg (162 lb 14.4 oz)   09/13/19 72.6 kg (160 lb)   09/01/19 75.5 kg (166 lb 8 oz)   08/28/19 75.8 kg (167 lb)   08/13/19 76.7 kg (169 lb 1.6 oz)   07/23/19 77.5 kg (170 lb 12.8 oz)   07/02/19 76.7 kg (169 lb 3.2 oz)   06/11/19 78.9 kg (173 lb 14.4 oz)   05/28/19 80.3 kg (177 lb)     HEENT: EOMI, PERRL. Sclerae are anicteric. Oral mucosa is pink and moist with no lesions or thrush.   Lymph: Neck is supple with no lymphadenopathy in the cervical or supraclavicular areas.   Heart: Regular rate and rhythm.   Lungs: Clear to auscultation bilaterally.   Abdomen: Bowel sounds present, soft, nontender with no palpable hepatosplenomegaly or masses.   Extremities: No lower extremity edema noted bilaterally.   Neuro: Cranial nerves II through XII are grossly intact.  Skin: No rashes, petechiae, or bruising noted on exposed skin.    Laboratory Data:  Results for NATASHA LOUIS (MRN [id number]) as of 11/21/2019 14:34   11/21/2019 13:25   Sodium 139   Potassium 4.0   Chloride 107   Carbon Dioxide 27   Urea Nitrogen 13   Creatinine 0.74   GFR Estimate 86   GFR Estimate If Black >90   Calcium 8.7   Anion Gap 5   Albumin 3.3 (L)   Protein Total 7.6   Bilirubin Total 0.2   Alkaline Phosphatase 43   ALT 29   AST 19   Glucose 78   WBC 8.3   Hemoglobin 11.4 (L)   Hematocrit 37.4   Platelet Count 238   RBC Count 3.89    MCV 96   MCH 29.3   MCHC 30.5 (L)   RDW 14.2   Diff Method Automated Method   % Neutrophils 55.4   % Lymphocytes 34.9   % Monocytes 6.1   % Eosinophils 2.9   % Basophils 0.5   % Immature Granulocytes 0.2   Nucleated RBCs 0   Absolute Neutrophil 4.6   Absolute Lymphocytes 2.9   Absolute Monocytes 0.5   Absolute Eosinophils 0.2   Absolute Basophils 0.0   Abs Immature Granulocytes 0.0   Absolute Nucleated RBC 0.0     Results for NATASHA LOUIS (MRN [id number]) as of 11/21/2019 17:35   10/8/2019 08:57 10/29/2019 08:01 11/18/2019 12:56 11/21/2019 13:25   CA 27-29 <5 7  11   Results for NATASHA LOUIS (MRN [id number]) as of 11/21/2019 17:35   10/8/2019 08:57 10/29/2019 08:01 11/18/2019 12:56 11/21/2019 13:25   CEA <0.5 0.6  <0.5       Imaging:  CT CHEST/ABDOMEN/PELVIS WITH CONTRAST  November 18, 2019 12:56 PM     HISTORY: Invasive breast cancer, stage IV, post chemotherapy, assess  therapy response. Malignant neoplasm of left female breast,  unspecified estrogen receptor status, unspecified site of breast (H).     TECHNIQUE: CT scan obtained of the chest, abdomen, and pelvis with  oral and IV contrast. Isovue 370 99mL IV injected. Radiation dose for  this scan was reduced using automated exposure control, adjustment of  the mA and/or kV according to patient size, or iterative  reconstruction technique.     COMPARISON: CT chest, abdomen, and pelvis 8/31/2019.     FINDINGS:  Chest: Stable right chest port venous catheter, its tip at the  cavoatrial level. No new adenopathy identified within the chest. Some  small scarring at the left axilla appears stable. Left mastectomy. No  acute mediastinal abnormality. No effusion.     Stable 0.4 cm nodule or scar posterior left upper lobe series 8 image  77. Medial left lower lobe nodule continues to measure 0.6 cm series 8  image 205. Stable tiny nodule right major fissure series 8 image 63.  No new airspace disease identified.     Abdomen/pelvis: Cholecystectomy.  Fluid collection at the operative  site tiny bubbles of gas noted series 6 image 55. The fluid collection  is 3.1 x 2.0 cm series 6 image 56. No biliary dilatation. No focal  hepatic abnormality. The adrenals, spleen, pancreas, and kidneys are  stable with no new significant abnormality. Fibroid uterus again  identified appearing grossly stable. Example fibroid towards the right  continues to measure 3.3 cm series 6 image 110. No new enlarging lymph  nodes seen within the abdomen or pelvis. No acute bowel abnormality  identified.     Bone windows of the chest, abdomen, and pelvis again shows multifocal  bony metastatic disease at several locations within the spine and  pelvis appearing stable.                                                                      IMPRESSION:   1. Stable exam without evidence for new disease.  2. Stable bony metastatic disease.  3. Stable pulmonary nodules that are nonspecific. Recommend  surveillance imaging.  4. Interval cholecystectomy. Focal fluid collection with tiny bubbles  of gas at the cholecystectomy site.     RAFA EWING MD    Assessment and Plan:    1. Metastatic breast cancer, ER, [place], HER2+ positive:   Was last treated in [facility] with Herceptin. We have continued Herceptin every 3 weeks as well as daily tamoxifen. CT CAP on 11/18/19 with stable disease.   --Last Echocardiogram on 9/13/19 with EF 57% and normal LV function; no significant change to prior.   - Started letrozole ~10/8. Tolerating well without noticeable side effects.   - Tumor markers stable  -Scheduled for echo, labs, KARISSA (to review echo) and Herceptin on 12/17. Will schedule labs, Herceptin on 1/7/2020. Reschedule labs, Dr. Aguiar and Herceptin to 1/30.     2. Bone metastasis: On xgeva every 6 weeks. On calcium + vitamin D. Calcium WNL. Cleared by dentist to proceed. Last dose 10/29. Could consider changing to every 3 months.      3. LUE lymphedema: Has been to lymphedema therapy and has compression sleeve.  No recent issues.      Jossie Sparrow PA-C  [facility]  [address], [place] [id number] [phone]

## 2019-11-21 NOTE — LETTER
11/21/2019       RE: Hoda Brush  [address]  [place] [place] [address]     Dear Colleague,    Thank you for referring your patient, Hoda Brush, to the [facility] at [facility]. Please see a copy of my visit note below.    Oncology/Hematology Visit Note  Nov 21, 2019    Reason for Visit: follow up of ER positive, HER2 positive metastatic left breast cancer    History of Present Illness: Hoda Brush is a 64 year old female with ER positive, HER2 positive metastatic left breast cancer (bone).    TREATMENT HISTORY:  A. Initial diagnosis with metastatic breast cancer in [facility].  Neoadjuvant CAF x 6.   B. Left mastectomy. Left axillary node dissection.  C.  Radiation in 1 dose to R iliac region. g Gy.  C. Herceptin for 2 years taxane for a prescribed course then stopped, monthly zoledronic acid.  She had 2 years of Herceptin with tamoxifen added after chemotherapy.  D.  Tamoxifen alone and zoledronic acid every 3 months.  E[place]  [place]  We restarted Herceptin every 3 weeks and continued tamoxifen. Bone targeted agent changed to denosumab every 6 weeks.     She is from [facility], formerly from [facility], and [place]  She lives with her daughter and is here for continuation of every 3 week Herceptin, which she receives along with tamoxifen.  Her tumor is ER positive, [place] positive, HER-2 positive.  She had metastatic disease at the time of presentation with bone-only metastases by report.  She presented with metastatic disease in 02/2014.  Staging was initially bone-only metastases by report.  She did her staging in 02/2014 that showed that she had stage IV disease, T4N2M1 invasive ductal carcinoma of the left breast.  She had a right hip metastasis.  She underwent neoadjuvant CAF, left mastectomy, left axillary lymph node dissection and radiation.  She had radiation of the right iliac region where she had  metastatic disease.  Pathology report from [facility] shows the tumor to be positive for ER in 75% of the cells, positive for [place] in 40% of the cells, and the HER-2 was 3+ positive by immunohistochemistry.  The Ki-67 was 20%.  She had no evidence of nonbone metastatic disease on her PET/CT scan from 08/2017.     Restaging on 7/22/19 was stable. Had acute abdominal pain and N/V and found to have cholecystitis in the ER. Had cholecystectomy on 8/31. Herceptin resumed on 9/13/19. Tamoxifen changed to letrozole on 10/8/19.    Interval History:  Hoda is here with . She continues on letrozole and she is tolerating well. She denies any hotflashes, joint pain/stiffness, vaginal dryness. Energy is good. Mood is good. She is traveling to [facility] on 1/10  to visit her mother for her mother's 90th birthday. She would like her 1/9 appointments changed to 1/7 as she states she has fatigue the day following Herceptin.     She takes walks with her  for exercise those not daily due to colder weather. She denies any dizziness, chest pain, dyspnea, cough. No infectious concerns. She had some problems with constipation after cholecystectomy but these have resolved. Appetite good. No nausea. No new bone pain.    Review of Systems:  Patient denies any of the following except if noted above: fevers, chills, palpitations, urinary concerns, headaches, rashes or skin lesions, leg swelling.    Current Outpatient Medications   Medication Sig Dispense Refill     acetaminophen (TYLENOL) 500 MG tablet Take 1,000 mg by mouth every 6 hours as needed for mild pain       ibuprofen (ADVIL/MOTRIN) 600 MG tablet Take 600 mg by mouth every 6 hours as needed for moderate pain       letrozole (FEMARA) 2.5 MG tablet Take 1 tablet (2.5 mg) by mouth daily 90 tablet 3     melatonin 3 MG tablet Take 1 tablet (3 mg) by mouth nightly as needed for sleep 30 tablet 11     order for DME Equipment being ordered: 2 Mastectomy bras and 1 prosthetheses. 2  Piece 0     order for DME L UE class 2 compression sleeve and gauntlet  Night garment  Bandaging supplies 1 each 1     oxyCODONE (ROXICODONE) 5 MG tablet Take 1 tablet (5 mg) by mouth every 3 hours as needed for breakthrough pain or severe pain (Patient not taking: Reported on 10/29/2019) 12 tablet 0     SENNA-docusate sodium (SENNA S) 8.6-50 MG tablet Take 1 tablet by mouth At Bedtime Take while taking oxycodone to prevent constipation. (Patient not taking: Reported on 10/29/2019) 24 tablet 0     tamoxifen (NOLVADEX) 20 MG tablet Take 1 tablet (20 mg) by mouth daily (Patient not taking: Reported on 10/29/2019) 90 tablet 3     VITAMIN D, CHOLECALCIFEROL, PO Take 1,000 Units by mouth daily         Physical Examination:  General: The patient is a pleasant female in no acute distress.  There were no vitals taken for this visit.  Wt Readings from Last 10 Encounters:   10/29/19 73.5 kg (162 lb)   10/08/19 73.9 kg (162 lb 14.4 oz)   09/13/19 72.6 kg (160 lb)   09/01/19 75.5 kg (166 lb 8 oz)   08/28/19 75.8 kg (167 lb)   08/13/19 76.7 kg (169 lb 1.6 oz)   07/23/19 77.5 kg (170 lb 12.8 oz)   07/02/19 76.7 kg (169 lb 3.2 oz)   06/11/19 78.9 kg (173 lb 14.4 oz)   05/28/19 80.3 kg (177 lb)     HEENT: EOMI, PERRL. Sclerae are anicteric. Oral mucosa is pink and moist with no lesions or thrush.   Lymph: Neck is supple with no lymphadenopathy in the cervical or supraclavicular areas.   Heart: Regular rate and rhythm.   Lungs: Clear to auscultation bilaterally.   Abdomen: Bowel sounds present, soft, nontender with no palpable hepatosplenomegaly or masses.   Extremities: No lower extremity edema noted bilaterally.   Neuro: Cranial nerves II through XII are grossly intact.  Skin: No rashes, petechiae, or bruising noted on exposed skin.    Laboratory Data:  Results for NATASHA LOUIS (MRN [id number]) as of 11/21/2019 14:34   11/21/2019 13:25   Sodium 139   Potassium 4.0   Chloride 107   Carbon Dioxide 27   Urea Nitrogen 13    Creatinine 0.74   GFR Estimate 86   GFR Estimate If Black >90   Calcium 8.7   Anion Gap 5   Albumin 3.3 (L)   Protein Total 7.6   Bilirubin Total 0.2   Alkaline Phosphatase 43   ALT 29   AST 19   Glucose 78   WBC 8.3   Hemoglobin 11.4 (L)   Hematocrit 37.4   Platelet Count 238   RBC Count 3.89   MCV 96   MCH 29.3   MCHC 30.5 (L)   RDW 14.2   Diff Method Automated Method   % Neutrophils 55.4   % Lymphocytes 34.9   % Monocytes 6.1   % Eosinophils 2.9   % Basophils 0.5   % Immature Granulocytes 0.2   Nucleated RBCs 0   Absolute Neutrophil 4.6   Absolute Lymphocytes 2.9   Absolute Monocytes 0.5   Absolute Eosinophils 0.2   Absolute Basophils 0.0   Abs Immature Granulocytes 0.0   Absolute Nucleated RBC 0.0     Results for NATASHA LOUIS (MRN [id number]) as of 11/21/2019 17:35   10/8/2019 08:57 10/29/2019 08:01 11/18/2019 12:56 11/21/2019 13:25   CA 27-29 <5 7  11   Results for NATASHA LOUIS (MRN [id number]) as of 11/21/2019 17:35   10/8/2019 08:57 10/29/2019 08:01 11/18/2019 12:56 11/21/2019 13:25   CEA <0.5 0.6  <0.5       Imaging:  CT CHEST/ABDOMEN/PELVIS WITH CONTRAST  November 18, 2019 12:56 PM     HISTORY: Invasive breast cancer, stage IV, post chemotherapy, assess  therapy response. Malignant neoplasm of left female breast,  unspecified estrogen receptor status, unspecified site of breast (H).     TECHNIQUE: CT scan obtained of the chest, abdomen, and pelvis with  oral and IV contrast. Isovue 370 99mL IV injected. Radiation dose for  this scan was reduced using automated exposure control, adjustment of  the mA and/or kV according to patient size, or iterative  reconstruction technique.     COMPARISON: CT chest, abdomen, and pelvis 8/31/2019.     FINDINGS:  Chest: Stable right chest port venous catheter, its tip at the  cavoatrial level. No new adenopathy identified within the chest. Some  small scarring at the left axilla appears stable. Left mastectomy. No  acute mediastinal abnormality. No  effusion.     Stable 0.4 cm nodule or scar posterior left upper lobe series 8 image  77. Medial left lower lobe nodule continues to measure 0.6 cm series 8  image 205. Stable tiny nodule right major fissure series 8 image 63.  No new airspace disease identified.     Abdomen/pelvis: Cholecystectomy. Fluid collection at the operative  site tiny bubbles of gas noted series 6 image 55. The fluid collection  is 3.1 x 2.0 cm series 6 image 56. No biliary dilatation. No focal  hepatic abnormality. The adrenals, spleen, pancreas, and kidneys are  stable with no new significant abnormality. Fibroid uterus again  identified appearing grossly stable. Example fibroid towards the right  continues to measure 3.3 cm series 6 image 110. No new enlarging lymph  nodes seen within the abdomen or pelvis. No acute bowel abnormality  identified.     Bone windows of the chest, abdomen, and pelvis again shows multifocal  bony metastatic disease at several locations within the spine and  pelvis appearing stable.                                                                      IMPRESSION:   1. Stable exam without evidence for new disease.  2. Stable bony metastatic disease.  3. Stable pulmonary nodules that are nonspecific. Recommend  surveillance imaging.  4. Interval cholecystectomy. Focal fluid collection with tiny bubbles  of gas at the cholecystectomy site.     RAFA EWING MD    Assessment and Plan:    1. Metastatic breast cancer, ER, [place], HER2+ positive:   Was last treated in [facility] with Herceptin. We have continued Herceptin every 3 weeks as well as daily tamoxifen. CT CAP on 11/18/19 with stable disease.   --Last Echocardiogram on 9/13/19 with EF 57% and normal LV function; no significant change to prior.   - Started letrozole ~10/8. Tolerating well without noticeable side effects.   - Tumor markers stable  -Scheduled for echo, labs, KARISSA (to review echo) and Herceptin on 12/17. Will schedule labs, Herceptin on 1/7/2020.  Reschedule labs, Dr. Aguiar and Herceptin to 1/30.     2. Bone metastasis: On xgeva every 6 weeks. On calcium + vitamin D. Calcium WNL. Cleared by dentist to proceed. Last dose 10/29. Could consider changing to every 3 months.      3. LUE lymphedema: Has been to lymphedema therapy and has compression sleeve. No recent issues.      Jossie Sparrow PA-C  [facility]  [address], [place] [address] [phone]

## 2019-11-21 NOTE — LETTER
11/21/2019      RE: Hoda Brush  [address] [facility] [address]       Oncology/Hematology Visit Note  Nov 21, 2019    Reason for Visit: follow up of ER positive, HER2 positive metastatic left breast cancer    History of Present Illness: Hoda Brush is a 64 year old female with ER positive, HER2 positive metastatic left breast cancer (bone).    TREATMENT HISTORY:  A. Initial diagnosis with metastatic breast cancer in [facility] [place].  Neoadjuvant CAF x 6.   B. Left mastectomy. Left axillary node dissection.  C.  Radiation in 1 dose to R iliac region. g Gy.  C. Herceptin for 2 years taxane for a prescribed course then stopped, monthly zoledronic acid.  She had 2 years of Herceptin with tamoxifen added after chemotherapy.  D.  Tamoxifen alone and zoledronic acid every 3 months.  E.  [place]  We restarted Herceptin every 3 weeks and continued tamoxifen. Bone targeted agent changed to denosumab every 6 weeks.     She is from [facility], formerly from [facility], [place]  She lives with her daughter and is here for continuation of every 3 week Herceptin, which she receives along with tamoxifen.  Her tumor is ER positive, AK positive, HER-2 positive.  She had metastatic disease at the time of presentation with bone-only metastases by report.  She presented with metastatic disease in 02/2014.  Staging was initially bone-only metastases by report.  She did her staging in 02/2014 that showed that she had stage IV disease, T4N2M1 invasive ductal carcinoma of the left breast.  She had a right hip metastasis.  She underwent neoadjuvant CAF, left mastectomy, left axillary lymph node dissection and radiation.  She had radiation of the right iliac region where she had metastatic disease.  Pathology report from [facility] shows the tumor to be positive for ER in 75% of the cells, positive for AK in 40% of the cells, and the HER-2 was 3+ positive by immunohistochemistry.  The Ki-67 was  20%.  She had no evidence of nonbone metastatic disease on her PET/CT scan from 08/2017.     Restaging on 7/22/19 was stable. Had acute abdominal pain and N/V and found to have cholecystitis in the ER. Had cholecystectomy on 8/31. Herceptin resumed on 9/13/19. Tamoxifen changed to letrozole on 10/8/19.    Interval History:  Hoda is here with . She continues on letrozole and she is tolerating well. She denies any hotflashes, joint pain/stiffness, vaginal dryness. Energy is good. Mood is good. She is traveling to [facility] on 1/10  to visit her mother for her mother's 90th birthday. She would like her 1/9 appointments changed to 1/7 as she states she has fatigue the day following Herceptin.     She takes walks with her  for exercise those not daily due to colder weather. She denies any dizziness, chest pain, dyspnea, cough. No infectious concerns. She had some problems with constipation after cholecystectomy but these have resolved. Appetite good. No nausea. No new bone pain.    Review of Systems:  Patient denies any of the following except if noted above: fevers, chills, palpitations, urinary concerns, headaches, rashes or skin lesions, leg swelling.    Current Outpatient Medications   Medication Sig Dispense Refill     acetaminophen (TYLENOL) 500 MG tablet Take 1,000 mg by mouth every 6 hours as needed for mild pain       ibuprofen (ADVIL/MOTRIN) 600 MG tablet Take 600 mg by mouth every 6 hours as needed for moderate pain       letrozole (FEMARA) 2.5 MG tablet Take 1 tablet (2.5 mg) by mouth daily 90 tablet 3     melatonin 3 MG tablet Take 1 tablet (3 mg) by mouth nightly as needed for sleep 30 tablet 11     order for DME Equipment being ordered: 2 Mastectomy bras and 1 prosthetheses. 2 Piece 0     order for DME L UE class 2 compression sleeve and gauntlet  Night garment  Bandaging supplies 1 each 1     oxyCODONE (ROXICODONE) 5 MG tablet Take 1 tablet (5 mg) by mouth every 3 hours as needed for  breakthrough pain or severe pain (Patient not taking: Reported on 10/29/2019) 12 tablet 0     SENNA-docusate sodium (SENNA S) 8.6-50 MG tablet Take 1 tablet by mouth At Bedtime Take while taking oxycodone to prevent constipation. (Patient not taking: Reported on 10/29/2019) 24 tablet 0     tamoxifen (NOLVADEX) 20 MG tablet Take 1 tablet (20 mg) by mouth daily (Patient not taking: Reported on 10/29/2019) 90 tablet 3     VITAMIN D, CHOLECALCIFEROL, PO Take 1,000 Units by mouth daily         Physical Examination:  General: The patient is a pleasant female in no acute distress.  There were no vitals taken for this visit.  Wt Readings from Last 10 Encounters:   10/29/19 73.5 kg (162 lb)   10/08/19 73.9 kg (162 lb 14.4 oz)   09/13/19 72.6 kg (160 lb)   09/01/19 75.5 kg (166 lb 8 oz)   08/28/19 75.8 kg (167 lb)   08/13/19 76.7 kg (169 lb 1.6 oz)   07/23/19 77.5 kg (170 lb 12.8 oz)   07/02/19 76.7 kg (169 lb 3.2 oz)   06/11/19 78.9 kg (173 lb 14.4 oz)   05/28/19 80.3 kg (177 lb)     HEENT: EOMI, PERRL. Sclerae are anicteric. Oral mucosa is pink and moist with no lesions or thrush.   Lymph: Neck is supple with no lymphadenopathy in the cervical or supraclavicular areas.   Heart: Regular rate and rhythm.   Lungs: Clear to auscultation bilaterally.   Abdomen: Bowel sounds present, soft, nontender with no palpable hepatosplenomegaly or masses.   Extremities: No lower extremity edema noted bilaterally.   Neuro: Cranial nerves II through XII are grossly intact.  Skin: No rashes, petechiae, or bruising noted on exposed skin.    Laboratory Data:  Results for NATASHA LOUIS (MRN [id number]) as of 11/21/2019 14:34   11/21/2019 13:25   Sodium 139   Potassium 4.0   Chloride 107   Carbon Dioxide 27   Urea Nitrogen 13   Creatinine 0.74   GFR Estimate 86   GFR Estimate If Black >90   Calcium 8.7   Anion Gap 5   Albumin 3.3 (L)   Protein Total 7.6   Bilirubin Total 0.2   Alkaline Phosphatase 43   ALT 29   AST 19   Glucose 78   WBC  8.3   Hemoglobin 11.4 (L)   Hematocrit 37.4   Platelet Count 238   RBC Count 3.89   MCV 96   MCH 29.3   MCHC 30.5 (L)   RDW 14.2   Diff Method Automated Method   % Neutrophils 55.4   % Lymphocytes 34.9   % Monocytes 6.1   % Eosinophils 2.9   % Basophils 0.5   % Immature Granulocytes 0.2   Nucleated RBCs 0   Absolute Neutrophil 4.6   Absolute Lymphocytes 2.9   Absolute Monocytes 0.5   Absolute Eosinophils 0.2   Absolute Basophils 0.0   Abs Immature Granulocytes 0.0   Absolute Nucleated RBC 0.0     Results for NATASHA LOUIS (MRN [id number]) as of 11/21/2019 17:35   10/8/2019 08:57 10/29/2019 08:01 11/18/2019 12:56 11/21/2019 13:25   CA 27-29 <5 7  11   Results for NATASHA LOUIS (MRN [id number]) as of 11/21/2019 17:35   10/8/2019 08:57 10/29/2019 08:01 11/18/2019 12:56 11/21/2019 13:25   CEA <0.5 0.6  <0.5       Imaging:  CT CHEST/ABDOMEN/PELVIS WITH CONTRAST  November 18, 2019 12:56 PM     HISTORY: Invasive breast cancer, stage IV, post chemotherapy, assess  therapy response. Malignant neoplasm of left female breast,  unspecified estrogen receptor status, unspecified site of breast (H).     TECHNIQUE: CT scan obtained of the chest, abdomen, and pelvis with  oral and IV contrast. Isovue 370 99mL IV injected. Radiation dose for  this scan was reduced using automated exposure control, adjustment of  the mA and/or kV according to patient size, or iterative  reconstruction technique.     COMPARISON: CT chest, abdomen, and pelvis 8/31/2019.     FINDINGS:  Chest: Stable right chest port venous catheter, its tip at the  cavoatrial level. No new adenopathy identified within the chest. Some  small scarring at the left axilla appears stable. Left mastectomy. No  acute mediastinal abnormality. No effusion.     Stable 0.4 cm nodule or scar posterior left upper lobe series 8 image  77. Medial left lower lobe nodule continues to measure 0.6 cm series 8  image 205. Stable tiny nodule right major fissure series 8  image 63.  No new airspace disease identified.     Abdomen/pelvis: Cholecystectomy. Fluid collection at the operative  site tiny bubbles of gas noted series 6 image 55. The fluid collection  is 3.1 x 2.0 cm series 6 image 56. No biliary dilatation. No focal  hepatic abnormality. The adrenals, spleen, pancreas, and kidneys are  stable with no new significant abnormality. Fibroid uterus again  identified appearing grossly stable. Example fibroid towards the right  continues to measure 3.3 cm series 6 image 110. No new enlarging lymph  nodes seen within the abdomen or pelvis. No acute bowel abnormality  identified.     Bone windows of the chest, abdomen, and pelvis again shows multifocal  bony metastatic disease at several locations within the spine and  pelvis appearing stable.                                                                      IMPRESSION:   1. Stable exam without evidence for new disease.  2. Stable bony metastatic disease.  3. Stable pulmonary nodules that are nonspecific. Recommend  surveillance imaging.  4. Interval cholecystectomy. Focal fluid collection with tiny bubbles  of gas at the cholecystectomy site.     RAFA EWING MD    Assessment and Plan:    1. Metastatic breast cancer, ER, MI, HER2+ positive:   Was last treated in [facility] with Herceptin. We have continued Herceptin every 3 weeks as well as daily tamoxifen. CT CAP on 11/18/19 with stable disease.   --Last Echocardiogram on 9/13/19 with EF 57% and normal LV function; no significant change to prior.   - Started letrozole ~10/8. Tolerating well without noticeable side effects.   - Tumor markers stable  -Scheduled for echo, labs, KARISSA (to review echo) and Herceptin on 12/17. Will schedule labs, Herceptin on 1/7/2020. Reschedule labs, Dr. Aguiar and Herceptin to 1/30.     2. Bone metastasis: On xgeva every 6 weeks. On calcium + vitamin D. Calcium WNL. Cleared by dentist to proceed. Last dose 10/29. Could consider changing to every 3  months.      3. LUE lymphedema: Has been to lymphedema therapy and has compression sleeve. No recent issues.      Jossie Sparrow PA-C  [facility] [place] [id number] [place], [place] [id number] [phone]      SEMAJ Arnold

## 2019-11-21 NOTE — NURSING NOTE
"Oncology Rooming Note    November 21, 2019 2:06 PM   Hoda Brush is a 64 year old female who presents for:    Chief Complaint   Patient presents with     Port Draw     Port labs collected by RN.      Oncology Clinic Visit     Return visit. BREAST CANCER.      Initial Vitals: /60 (BP Location: Right arm, Patient Position: Sitting)   Pulse 72   Temp 98.2  F (36.8  C) (Oral)   Resp 20   Wt 73 kg (160 lb 14.4 oz)   SpO2 98%   BMI 29.43 kg/m   Estimated body mass index is 29.43 kg/m  as calculated from the following:    Height as of 8/31/19: 1.575 m (5' 2\").    Weight as of this encounter: 73 kg (160 lb 14.4 oz). Body surface area is 1.79 meters squared.  No Pain (0) Comment: Data Unavailable   No LMP recorded. Patient is postmenopausal.  Allergies reviewed: Yes  Medications reviewed: Yes    Medications: MEDICATION REFILLS NEEDED TODAY. Provider was notified.  Pharmacy name entered into Contract Live: [id number] [place], [place] - [address] AT  [address] [facility]    Clinical concerns: Refill on Femara.  Jossie was notified.      Nathalie Elias, HEATHER              "

## 2019-11-27 ENCOUNTER — TRANSFERRED RECORDS (OUTPATIENT)
Dept: HEALTH INFORMATION MANAGEMENT | Facility: CLINIC | Age: 64
End: 2019-11-27

## 2019-12-16 NOTE — PROGRESS NOTES
Oncology/Hematology Visit Note  Dec 17, 2019    Reason for Visit: follow up of ER positive, HER2 positive metastatic left breast cancer    History of Present Illness: Hoda Brush is a 64 year old female with ER positive, HER2 positive metastatic left breast cancer (bone).    TREATMENT HISTORY:  A. Initial diagnosis with metastatic breast cancer in [facility].  Neoadjuvant CAF x 6.   B. Left mastectomy. Left axillary node dissection.  C.  Radiation in 1 dose to R iliac region. g Gy.  C. Herceptin for 2 years taxane for a prescribed course then stopped, monthly zoledronic acid.  She had 2 years of Herceptin with tamoxifen added after chemotherapy.  D.  Tamoxifen alone and zoledronic acid every 3 months.  E.  [place]  We restarted Herceptin every 3 weeks and continued tamoxifen. Bone targeted agent changed to denosumab every 6 weeks.   F. Tamoxifen changed to letrozole 10/8/19.     She is from [facility], [facility] from [facility], and [place]  She lives with her daughter and is here for continuation of every 3 week Herceptin, which she receives along with tamoxifen.  Her tumor is ER positive, KS positive, HER-2 positive.  She had metastatic disease at the time of presentation with bone-only metastases by report.  She presented with metastatic disease in 02/2014.  Staging was initially bone-only metastases by report.  She did her staging in 02/2014 that showed that she had stage IV disease, T4N2M1 invasive ductal carcinoma of the left breast.  She had a right hip metastasis.  She underwent neoadjuvant CAF, left mastectomy, left axillary lymph node dissection and radiation.  She had radiation of the right iliac region where she had metastatic disease.  Pathology report from [facility] shows the tumor to be positive for ER in 75% of the cells, positive for KS in 40% of the cells, and the HER-2 was 3+ positive by immunohistochemistry.  The Ki-67 was 20%.  She had no evidence of nonbone metastatic  disease on her PET/CT scan from 08/2017.     Restaging on 7/22/19 was stable. Had acute abdominal pain and N/V and found to have cholecystitis in the ER. Had cholecystectomy on 8/31. Herceptin resumed on 9/13/19. Tamoxifen changed to letrozole on 10/8/19.    Interval History:  Hoda is here with . She is feeling well today. She has been tolerating letrozole well with minimal stiffness in her back in the morning which resolves when she is active. She has intermittent muscle cramping in lower legs as well.   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 She had biopsy done and debridement on 11/27. She has been using a mouth rinse. She had follow-up with oral surgery 2 weeks later and was noted to be healing well so follow-up is PRN.     She denies any hot flashes, nausea/vomiting, changes in bowel movements, fevers/chills, cough, sore throat, congestion, SOB, CP, edema, difficulties with urination. ROS otherwise negative.     Current Outpatient Medications   Medication Sig Dispense Refill     acetaminophen (TYLENOL) 500 MG tablet Take 1,000 mg by mouth every 6 hours as needed for mild pain       ibuprofen (ADVIL/MOTRIN) 600 MG tablet Take 600 mg by mouth every 6 hours as needed for moderate pain       letrozole (FEMARA) 2.5 MG tablet Take 1 tablet (2.5 mg) by mouth daily 90 tablet 3     melatonin 3 MG tablet Take 1 tablet (3 mg) by mouth nightly as needed for sleep 30 tablet 11     order for DME Equipment being ordered: 2 Mastectomy bras and 1 prosthetheses. 2 Piece 0     order for DME L UE class 2 compression sleeve and gauntlet  Night garment  Bandaging supplies 1 each 1     oxyCODONE (ROXICODONE) 5 MG tablet Take 1 tablet (5 mg) by mouth every 3 hours as needed for breakthrough pain or severe pain 12 tablet 0     SENNA-docusate sodium (SENNA S) 8.6-50 MG tablet Take 1 tablet by mouth At Bedtime Take while taking oxycodone to prevent constipation. 24 tablet 0     VITAMIN  D, CHOLECALCIFEROL, PO Take 1,000 Units by mouth daily         Physical Examination:  General: The patient is a pleasant female in no acute distress.  /59   Pulse 79   Temp 98.1  F (36.7  C) (Oral)   Resp 14   Wt 74.3 kg (163 lb 11.2 oz)   SpO2 96%   BMI 29.94 kg/m    Wt Readings from Last 10 Encounters:   12/17/19 74.3 kg (163 lb 11.2 oz)   11/21/19 73 kg (160 lb 14.4 oz)   10/29/19 73.5 kg (162 lb)   10/08/19 73.9 kg (162 lb 14.4 oz)   09/13/19 72.6 kg (160 lb)   09/01/19 75.5 kg (166 lb 8 oz)   08/28/19 75.8 kg (167 lb)   08/13/19 76.7 kg (169 lb 1.6 oz)   07/23/19 77.5 kg (170 lb 12.8 oz)   07/02/19 76.7 kg (169 lb 3.2 oz)     HEENT: EOMI, PERRL. Sclerae are anicteric. Oral mucosa is pink and moist with no lesions or thrush. R gingival tissue is well-healed without any ulceration or visible bone.   Lymph: Neck is supple with no lymphadenopathy in the cervical or supraclavicular areas.   Heart: Regular rate and rhythm.   Lungs: Clear to auscultation bilaterally.   Abdomen: Bowel sounds present, soft, nontender with no palpable hepatosplenomegaly or masses.   Extremities: No lower extremity edema noted bilaterally.   Neuro: Cranial nerves II through XII are grossly intact.  Skin: No rashes, petechiae, or bruising noted on exposed skin.    Laboratory Data:    12/17/2019 12:36   Sodium 143   Potassium 4.1   Chloride 110 (H)   Carbon Dioxide 28   Urea Nitrogen 15   Creatinine 0.71   GFR Estimate 90   GFR Estimate If Black >90   Calcium 8.4 (L)   Anion Gap 4   Albumin 3.1 (L)   Protein Total 7.1   Bilirubin Total 0.2   Alkaline Phosphatase 48   ALT 40   AST 37   Glucose 78   WBC 6.6   Hemoglobin 11.2 (L)   Hematocrit 36.6   Platelet Count 160   RBC Count 3.86   MCV 95   MCH 29.0   MCHC 30.6 (L)   RDW 14.5   Diff Method Automated Method   % Neutrophils 53.8   % Lymphocytes 33.6   % Monocytes 8.5   % Eosinophils 3.3   % Basophils 0.5   % Immature Granulocytes 0.3   Nucleated RBCs 0   Absolute Neutrophil 3.6    Absolute Lymphocytes 2.2   Absolute Monocytes 0.6   Absolute Eosinophils 0.2   Absolute Basophils 0.0   Abs Immature Granulocytes 0.0   Absolute Nucleated RBC 0.0   Tumor markers pending    Echo pending     Assessment and Plan:    1. Metastatic breast cancer, ER, RI, HER2+ positive:   Was last treated in [facility] with Herceptin. We have continued Herceptin every 3 weeks as well as daily letrozole. CT CAP on 11/18/19 with stable disease.   --Last Echocardiogram on 9/13/19. Done today, result is pending. Follow-up study in 3 months.   - Started letrozole ~10/8. Tolerating well without noticeable side effects besides mild achiness in back in AM.   - Tumor markers pending today but have been stable.   -Follow-up in 6 weeks, herceptin alone in 3 weeks.      2. Bone metastasis: Has previously been on xgeva every 6 weeks. On calcium + vitamin D. Will need to discontinue xgeva due to osteonecrosis of maxilla. This was biopsy proven. Note from oral surgery was scanned in chart.     3. Osteonecrosis: Of R upper maxilla. Tissue is well-healed. She has been given a rinse to use.       4. LUE lymphedema: Has been to lymphedema therapy and has compression sleeve. No recent issues.     5. Muscle cramps/stiffness: Discussed good hydration, stretching, routine exercise. Continue vitamin D.      Anne Lea PA-C  [facility]  [address], [place] [id number] [phone]

## 2019-12-17 ENCOUNTER — APPOINTMENT (OUTPATIENT)
Dept: LAB | Facility: CLINIC | Age: 64
End: 2019-12-17
Attending: INTERNAL MEDICINE
Payer: COMMERCIAL

## 2019-12-17 ENCOUNTER — ANCILLARY PROCEDURE (OUTPATIENT)
Dept: CARDIOLOGY | Facility: CLINIC | Age: 64
End: 2019-12-17
Attending: PHYSICIAN ASSISTANT
Payer: COMMERCIAL

## 2019-12-17 ENCOUNTER — INFUSION THERAPY VISIT (OUTPATIENT)
Dept: ONCOLOGY | Facility: CLINIC | Age: 64
End: 2019-12-17
Attending: PHYSICIAN ASSISTANT
Payer: COMMERCIAL

## 2019-12-17 VITALS
TEMPERATURE: 98.1 F | RESPIRATION RATE: 14 BRPM | BODY MASS INDEX: 29.94 KG/M2 | HEART RATE: 79 BPM | DIASTOLIC BLOOD PRESSURE: 59 MMHG | WEIGHT: 163.7 LBS | SYSTOLIC BLOOD PRESSURE: 115 MMHG | OXYGEN SATURATION: 96 %

## 2019-12-17 DIAGNOSIS — C50.912 MALIGNANT NEOPLASM OF LEFT FEMALE BREAST, UNSPECIFIED ESTROGEN RECEPTOR STATUS, UNSPECIFIED SITE OF BREAST (H): Primary | ICD-10-CM

## 2019-12-17 DIAGNOSIS — C50.919 METASTATIC BREAST CANCER: ICD-10-CM

## 2019-12-17 DIAGNOSIS — C50.919 METASTATIC BREAST CANCER: Primary | ICD-10-CM

## 2019-12-17 DIAGNOSIS — C79.51 BONE METASTASIS: ICD-10-CM

## 2019-12-17 LAB
ALBUMIN SERPL-MCNC: 3.1 G/DL (ref 3.4–5)
ALP SERPL-CCNC: 48 U/L (ref 40–150)
ALT SERPL W P-5'-P-CCNC: 40 U/L (ref 0–50)
ANION GAP SERPL CALCULATED.3IONS-SCNC: 4 MMOL/L (ref 3–14)
AST SERPL W P-5'-P-CCNC: 37 U/L (ref 0–45)
BASOPHILS # BLD AUTO: 0 10E9/L (ref 0–0.2)
BASOPHILS NFR BLD AUTO: 0.5 %
BILIRUB SERPL-MCNC: 0.2 MG/DL (ref 0.2–1.3)
BUN SERPL-MCNC: 15 MG/DL (ref 7–30)
CALCIUM SERPL-MCNC: 8.4 MG/DL (ref 8.5–10.1)
CANCER AG27-29 SERPL-ACNC: 9 U/ML (ref 0–39)
CEA SERPL-MCNC: <0.5 UG/L (ref 0–2.5)
CHLORIDE SERPL-SCNC: 110 MMOL/L (ref 94–109)
CO2 SERPL-SCNC: 28 MMOL/L (ref 20–32)
CREAT SERPL-MCNC: 0.71 MG/DL (ref 0.52–1.04)
DIFFERENTIAL METHOD BLD: ABNORMAL
EOSINOPHIL # BLD AUTO: 0.2 10E9/L (ref 0–0.7)
EOSINOPHIL NFR BLD AUTO: 3.3 %
ERYTHROCYTE [DISTWIDTH] IN BLOOD BY AUTOMATED COUNT: 14.5 % (ref 10–15)
GFR SERPL CREATININE-BSD FRML MDRD: 90 ML/MIN/{1.73_M2}
GLUCOSE SERPL-MCNC: 78 MG/DL (ref 70–99)
HCT VFR BLD AUTO: 36.6 % (ref 35–47)
HGB BLD-MCNC: 11.2 G/DL (ref 11.7–15.7)
IMM GRANULOCYTES # BLD: 0 10E9/L (ref 0–0.4)
IMM GRANULOCYTES NFR BLD: 0.3 %
LYMPHOCYTES # BLD AUTO: 2.2 10E9/L (ref 0.8–5.3)
LYMPHOCYTES NFR BLD AUTO: 33.6 %
MCH RBC QN AUTO: 29 PG (ref 26.5–33)
MCHC RBC AUTO-ENTMCNC: 30.6 G/DL (ref 31.5–36.5)
MCV RBC AUTO: 95 FL (ref 78–100)
MONOCYTES # BLD AUTO: 0.6 10E9/L (ref 0–1.3)
MONOCYTES NFR BLD AUTO: 8.5 %
NEUTROPHILS # BLD AUTO: 3.6 10E9/L (ref 1.6–8.3)
NEUTROPHILS NFR BLD AUTO: 53.8 %
NRBC # BLD AUTO: 0 10*3/UL
NRBC BLD AUTO-RTO: 0 /100
PLATELET # BLD AUTO: 160 10E9/L (ref 150–450)
POTASSIUM SERPL-SCNC: 4.1 MMOL/L (ref 3.4–5.3)
PROT SERPL-MCNC: 7.1 G/DL (ref 6.8–8.8)
RBC # BLD AUTO: 3.86 10E12/L (ref 3.8–5.2)
SODIUM SERPL-SCNC: 143 MMOL/L (ref 133–144)
WBC # BLD AUTO: 6.6 10E9/L (ref 4–11)

## 2019-12-17 PROCEDURE — 25000128 H RX IP 250 OP 636: Mod: ZF | Performed by: PHYSICIAN ASSISTANT

## 2019-12-17 PROCEDURE — 99214 OFFICE O/P EST MOD 30 MIN: CPT | Mod: ZP | Performed by: PHYSICIAN ASSISTANT

## 2019-12-17 PROCEDURE — G0463 HOSPITAL OUTPT CLINIC VISIT: HCPCS | Mod: ZF

## 2019-12-17 PROCEDURE — 82378 CARCINOEMBRYONIC ANTIGEN: CPT | Performed by: PHYSICIAN ASSISTANT

## 2019-12-17 PROCEDURE — 85025 COMPLETE CBC W/AUTO DIFF WBC: CPT | Performed by: PHYSICIAN ASSISTANT

## 2019-12-17 PROCEDURE — 96413 CHEMO IV INFUSION 1 HR: CPT

## 2019-12-17 PROCEDURE — 80053 COMPREHEN METABOLIC PANEL: CPT | Performed by: PHYSICIAN ASSISTANT

## 2019-12-17 PROCEDURE — 25800030 ZZH RX IP 258 OP 636: Mod: ZF | Performed by: PHYSICIAN ASSISTANT

## 2019-12-17 PROCEDURE — 86300 IMMUNOASSAY TUMOR CA 15-3: CPT | Performed by: PHYSICIAN ASSISTANT

## 2019-12-17 RX ORDER — MEPERIDINE HYDROCHLORIDE 25 MG/ML
25 INJECTION INTRAMUSCULAR; INTRAVENOUS; SUBCUTANEOUS EVERY 30 MIN PRN
Status: CANCELLED | OUTPATIENT
Start: 2019-12-17

## 2019-12-17 RX ORDER — DIPHENHYDRAMINE HCL 25 MG
50 CAPSULE ORAL ONCE
Status: CANCELLED
Start: 2020-01-07

## 2019-12-17 RX ORDER — ALBUTEROL SULFATE 90 UG/1
1-2 AEROSOL, METERED RESPIRATORY (INHALATION)
Status: CANCELLED
Start: 2019-12-17

## 2019-12-17 RX ORDER — HEPARIN SODIUM (PORCINE) LOCK FLUSH IV SOLN 100 UNIT/ML 100 UNIT/ML
5 SOLUTION INTRAVENOUS EVERY 8 HOURS
Status: CANCELLED | OUTPATIENT
Start: 2019-12-17

## 2019-12-17 RX ORDER — DIPHENHYDRAMINE HYDROCHLORIDE 50 MG/ML
50 INJECTION INTRAMUSCULAR; INTRAVENOUS
Status: CANCELLED
Start: 2019-12-17

## 2019-12-17 RX ORDER — ACETAMINOPHEN 325 MG/1
650 TABLET ORAL
Status: CANCELLED | OUTPATIENT
Start: 2019-12-17

## 2019-12-17 RX ORDER — HEPARIN SODIUM (PORCINE) LOCK FLUSH IV SOLN 100 UNIT/ML 100 UNIT/ML
5 SOLUTION INTRAVENOUS EVERY 8 HOURS
Status: DISCONTINUED | OUTPATIENT
Start: 2019-12-17 | End: 2019-12-17 | Stop reason: HOSPADM

## 2019-12-17 RX ORDER — HEPARIN SODIUM (PORCINE) LOCK FLUSH IV SOLN 100 UNIT/ML 100 UNIT/ML
5 SOLUTION INTRAVENOUS DAILY PRN
Status: DISCONTINUED | OUTPATIENT
Start: 2019-12-17 | End: 2019-12-17 | Stop reason: HOSPADM

## 2019-12-17 RX ORDER — EPINEPHRINE 1 MG/ML
0.3 INJECTION, SOLUTION INTRAMUSCULAR; SUBCUTANEOUS EVERY 5 MIN PRN
Status: CANCELLED | OUTPATIENT
Start: 2019-12-17

## 2019-12-17 RX ORDER — ALBUTEROL SULFATE 0.83 MG/ML
2.5 SOLUTION RESPIRATORY (INHALATION)
Status: CANCELLED | OUTPATIENT
Start: 2019-12-17

## 2019-12-17 RX ORDER — METHYLPREDNISOLONE SODIUM SUCCINATE 125 MG/2ML
125 INJECTION, POWDER, LYOPHILIZED, FOR SOLUTION INTRAMUSCULAR; INTRAVENOUS
Status: CANCELLED
Start: 2019-12-17

## 2019-12-17 RX ORDER — ALBUTEROL SULFATE 0.83 MG/ML
2.5 SOLUTION RESPIRATORY (INHALATION)
Status: CANCELLED | OUTPATIENT
Start: 2020-01-07

## 2019-12-17 RX ORDER — HEPARIN SODIUM (PORCINE) LOCK FLUSH IV SOLN 100 UNIT/ML 100 UNIT/ML
5 SOLUTION INTRAVENOUS EVERY 8 HOURS
Status: CANCELLED | OUTPATIENT
Start: 2020-01-07

## 2019-12-17 RX ORDER — SODIUM CHLORIDE 9 MG/ML
1000 INJECTION, SOLUTION INTRAVENOUS CONTINUOUS PRN
Status: CANCELLED
Start: 2019-12-17

## 2019-12-17 RX ORDER — SODIUM CHLORIDE 9 MG/ML
1000 INJECTION, SOLUTION INTRAVENOUS CONTINUOUS PRN
Status: CANCELLED
Start: 2020-01-07

## 2019-12-17 RX ORDER — ALBUTEROL SULFATE 90 UG/1
1-2 AEROSOL, METERED RESPIRATORY (INHALATION)
Status: CANCELLED
Start: 2020-01-07

## 2019-12-17 RX ORDER — LORAZEPAM 2 MG/ML
0.5 INJECTION INTRAMUSCULAR EVERY 4 HOURS PRN
Status: CANCELLED
Start: 2019-12-17

## 2019-12-17 RX ORDER — LORAZEPAM 2 MG/ML
0.5 INJECTION INTRAMUSCULAR EVERY 4 HOURS PRN
Status: CANCELLED
Start: 2020-01-07

## 2019-12-17 RX ORDER — MEPERIDINE HYDROCHLORIDE 25 MG/ML
25 INJECTION INTRAMUSCULAR; INTRAVENOUS; SUBCUTANEOUS EVERY 30 MIN PRN
Status: CANCELLED | OUTPATIENT
Start: 2020-01-07

## 2019-12-17 RX ORDER — EPINEPHRINE 0.3 MG/.3ML
0.3 INJECTION SUBCUTANEOUS EVERY 5 MIN PRN
Status: CANCELLED | OUTPATIENT
Start: 2020-01-07

## 2019-12-17 RX ORDER — EPINEPHRINE 1 MG/ML
0.3 INJECTION, SOLUTION INTRAMUSCULAR; SUBCUTANEOUS EVERY 5 MIN PRN
Status: CANCELLED | OUTPATIENT
Start: 2020-01-07

## 2019-12-17 RX ORDER — DIPHENHYDRAMINE HYDROCHLORIDE 50 MG/ML
50 INJECTION INTRAMUSCULAR; INTRAVENOUS
Status: CANCELLED
Start: 2020-01-07

## 2019-12-17 RX ORDER — ACETAMINOPHEN 325 MG/1
650 TABLET ORAL
Status: CANCELLED | OUTPATIENT
Start: 2020-01-07

## 2019-12-17 RX ORDER — DIPHENHYDRAMINE HCL 25 MG
50 CAPSULE ORAL ONCE
Status: CANCELLED
Start: 2019-12-17

## 2019-12-17 RX ORDER — METHYLPREDNISOLONE SODIUM SUCCINATE 125 MG/2ML
125 INJECTION, POWDER, LYOPHILIZED, FOR SOLUTION INTRAMUSCULAR; INTRAVENOUS
Status: CANCELLED
Start: 2020-01-07

## 2019-12-17 RX ORDER — EPINEPHRINE 0.3 MG/.3ML
0.3 INJECTION SUBCUTANEOUS EVERY 5 MIN PRN
Status: CANCELLED | OUTPATIENT
Start: 2019-12-17

## 2019-12-17 RX ADMIN — Medication 5 ML: at 12:31

## 2019-12-17 RX ADMIN — Medication 5 ML: at 14:59

## 2019-12-17 RX ADMIN — SODIUM CHLORIDE 250 ML: 9 INJECTION, SOLUTION INTRAVENOUS at 14:21

## 2019-12-17 RX ADMIN — TRASTUZUMAB 450 MG: 150 INJECTION, POWDER, LYOPHILIZED, FOR SOLUTION INTRAVENOUS at 14:21

## 2019-12-17 ASSESSMENT — PAIN SCALES - GENERAL: PAINLEVEL: NO PAIN (0)

## 2019-12-17 NOTE — PATIENT INSTRUCTIONS
[facility] Triage and after hours / weekends / holidays:  [phone]    Please call the triage or after hours line if you experience a temperature greater than or equal to 100.5, shaking chills, have uncontrolled nausea, vomiting and/or diarrhea, dizziness, shortness of breath, chest pain, bleeding, unexplained bruising, or if you have any other new/concerning symptoms, questions or concerns.      If you are having any concerning symptoms or wish to speak to a provider before your next infusion visit, please call your care coordinator or triage to notify them so we can adequately serve you.     If you need a refill on a narcotic prescription or other medication, please call before your infusion appointment.             Dear Patients and Caregivers,    Each day we work diligently to eliminate any barriers to your care including working with your insurance coverage to preauthorize your facility administered medication treatment. Our goal is to be transparent with any anticipated concerns with coverage for your treatment. At the beginning of every year this goal is particularly challenging because of changes to insurance coverage.    How can you help?    Provide any new insurance card to the infusion nurse at your next appointment or enter the information into your [employer] account prior to January 1st 2020.     It is vital that if a  reaches out that you return their phone call in a timely manner. Due to regulations, the team is unable to leave detailed voicemails. When you return the finance teams call they will be able to inform you of the details so a decision about your appointment can be made.    Also please understand:    We go through this process each year and each year offers new challenges.    Insurance companies will not allow the reauthorization process to begin until 2020, not allowing us to work in advance on this process.    Even if you are not changing insurance plans, insurance  companies often update their policies requiring all treatments to be reauthorized.    As institutions across the country work to reauthorize treatments, insurance companies sometimes have longer than usual wait times in their call centers and leads to delayed response to prior authorization requests.     Thank you for your patience as we work this process. We do strive to keep you updated throughout the process if there are any delays or if the process is taking longer than anticipated. Lastly please feel free to speak with one of our infusion finance specialists at your next appointment or via phone ([phone]) if you have any questions. Thank you for choosing [facility] for your care.        December 2019 Sunday Monday Tuesday Wednesday Thursday Friday Saturday   1     2     3     4     5     6     7       8     9     10     11     12     13    [facility] ONC INFUSION 60  10:00 AM   (60 min.)    29 ATC   [facility] Advanced Treatment Oncology Infusion 14       15     16     17    ECHO COMPLETE  10:45 AM   (75 min.)   [id number]   [facility] Cardiac Services    [facility] LAB DRAW  12:30 PM   (30 min.)    [facility] LAB DRAW   [facility] Lab Draw    [facility] RETURN  12:45 PM   (75 min.)   Anne Lea PA-C   [facility] Cancer [facility]    [facility] ONC INFUSION 60   2:00 PM   (60 min.)    ONCOLOGY INFUSION   [facility] 18     19     20     21       22     23     24     25     26     27     28       29     30     31 January 2020 Sunday Monday Tuesday Wednesday Thursday Friday Saturday                  1     2     3     4       5     6     7    [facility] LAB DRAW   9:30 AM   (15 min.)    [facility] LAB DRAW   [facility] Lab Draw    [facility] ONC INFUSION 60  10:00 AM   (60 min.)    ONCOLOGY INFUSION   [facility] 8     9     10     11       12     13     14     15     16     17     18       19     20     21     22      23     24     25       26     27     28     29     30    [facility] LAB DRAW   7:45 AM   (15 min.)   [facility] LAB DRAW   [facility] Lab Draw    [facility] RETURN   7:55 AM   (30 min.)   Lisandro Aguiar MD   [facility] ONC INFUSION 60   9:00 AM   (60 min.)    ONCOLOGY INFUSION   [facility] 31                          Lab Results:  Recent Results (from the past 12 hour(s))   CBC with platelets differential    Collection Time: 12/17/19 12:36 PM   Result Value Ref Range    WBC 6.6 4.0 - 11.0 10e9/L    RBC Count 3.86 3.8 - 5.2 10e12/L    Hemoglobin 11.2 (L) 11.7 - 15.7 g/dL    Hematocrit 36.6 35.0 - 47.0 %    MCV 95 78 - 100 fl    MCH 29.0 26.5 - 33.0 pg    MCHC 30.6 (L) 31.5 - 36.5 g/dL    RDW 14.5 10.0 - 15.0 %    Platelet Count 160 150 - 450 10e9/L    Diff Method Automated Method     % Neutrophils 53.8 %    % Lymphocytes 33.6 %    % Monocytes 8.5 %    % Eosinophils 3.3 %    % Basophils 0.5 %    % Immature Granulocytes 0.3 %    Nucleated RBCs 0 0 /100    Absolute Neutrophil 3.6 1.6 - 8.3 10e9/L    Absolute Lymphocytes 2.2 0.8 - 5.3 10e9/L    Absolute Monocytes 0.6 0.0 - 1.3 10e9/L    Absolute Eosinophils 0.2 0.0 - 0.7 10e9/L    Absolute Basophils 0.0 0.0 - 0.2 10e9/L    Abs Immature Granulocytes 0.0 0 - 0.4 10e9/L    Absolute Nucleated RBC 0.0    Comprehensive metabolic panel    Collection Time: 12/17/19 12:36 PM   Result Value Ref Range    Sodium 143 133 - 144 mmol/L    Potassium 4.1 3.4 - 5.3 mmol/L    Chloride 110 (H) 94 - 109 mmol/L    Carbon Dioxide 28 20 - 32 mmol/L    Anion Gap 4 3 - 14 mmol/L    Glucose 78 70 - 99 mg/dL    Urea Nitrogen 15 7 - 30 mg/dL    Creatinine 0.71 0.52 - 1.04 mg/dL    GFR Estimate 90 >60 mL/min/[1.73_m2]    GFR Estimate If Black >90 >60 mL/min/[1.73_m2]    Calcium 8.4 (L) 8.5 - 10.1 mg/dL    Bilirubin Total 0.2 0.2 - 1.3 mg/dL    Albumin 3.1 (L) 3.4 - 5.0 g/dL    Protein Total 7.1 6.8 - 8.8 g/dL    Alkaline Phosphatase 48 40 -  150 U/L    ALT 40 0 - 50 U/L    AST 37 0 - 45 U/L

## 2019-12-17 NOTE — LETTER
12/17/2019      RE: Hoda Brush  [address] [place] [address]       Oncology/Hematology Visit Note  Dec 17, 2019    Reason for Visit: follow up of ER positive, HER2 positive metastatic left breast cancer    History of Present Illness: Hoda Brush is a 64 year old female with ER positive, HER2 positive metastatic left breast cancer (bone).    TREATMENT HISTORY:  A. Initial diagnosis with metastatic breast cancer in [facility].  Neoadjuvant CAF x 6.   B. Left mastectomy. Left axillary node dissection.  C.  Radiation in 1 dose to R iliac region. g Gy.  C. Herceptin for 2 years taxane for a prescribed course then stopped, monthly zoledronic acid.  She had 2 years of Herceptin with tamoxifen added after chemotherapy.  D.  Tamoxifen alone and zoledronic acid every 3 months.  E.  [place]  We restarted Herceptin every 3 weeks and continued tamoxifen. Bone targeted agent changed to denosumab every 6 weeks.   F. Tamoxifen changed to letrozole 10/8/19.     She is from [place], formerly from [facility], and came [place] live in the [place]  She lives with her daughter and is here for continuation of every 3 week Herceptin, which she receives along with tamoxifen.  Her tumor is ER positive, MA positive, HER-2 positive.  She had metastatic disease at the time of presentation with bone-only metastases by report.  She presented with metastatic disease in 02/2014.  Staging was initially bone-only metastases by report.  She did her staging in 02/2014 that showed that she had stage IV disease, [id number] invasive ductal carcinoma of the left breast.  She had a right hip metastasis.  She underwent neoadjuvant CAF, left mastectomy, left axillary lymph node dissection and radiation.  She had radiation of the right iliac region where she had metastatic disease.  Pathology report from [facility] shows the tumor to be positive for ER in 75% of the cells, positive for MA in 40% of the cells, and the HER-2 was 3+  positive by immunohistochemistry.  The Ki-67 was 20%.  She had no evidence of nonbone metastatic disease on her PET/CT scan from 08/2017.     Restaging on 7/22/19 was stable. Had acute abdominal pain and N/V and found to have cholecystitis in the ER. Had cholecystectomy on 8/31. Herceptin resumed on 9/13/19. Tamoxifen changed to letrozole on 10/8/19.    Interval History:  Hoda is here with . She is feeling well today. She has been tolerating letrozole well with minimal stiffness in her back in the morning which resolves when she is active. She has intermittent muscle cramping in lower legs as well.   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 She had biopsy done and debridement on 11/27. She has been using a mouth rinse. She had follow-up with oral surgery 2 weeks later and was noted to be healing well so follow-up is PRN.     She denies any hot flashes, nausea/vomiting, changes in bowel movements, fevers/chills, cough, sore throat, congestion, SOB, CP, edema, difficulties with urination. ROS otherwise negative.     Current Outpatient Medications   Medication Sig Dispense Refill     acetaminophen (TYLENOL) 500 MG tablet Take 1,000 mg by mouth every 6 hours as needed for mild pain       ibuprofen (ADVIL/MOTRIN) 600 MG tablet Take 600 mg by mouth every 6 hours as needed for moderate pain       letrozole (FEMARA) 2.5 MG tablet Take 1 tablet (2.5 mg) by mouth daily 90 tablet 3     melatonin 3 MG tablet Take 1 tablet (3 mg) by mouth nightly as needed for sleep 30 tablet 11     order for DME Equipment being ordered: 2 Mastectomy bras and 1 prosthetheses. 2 Piece 0     order for DME L UE class 2 compression sleeve and gauntlet  Night garment  Bandaging supplies 1 each 1     oxyCODONE (ROXICODONE) 5 MG tablet Take 1 tablet (5 mg) by mouth every 3 hours as needed for breakthrough pain or severe pain 12 tablet 0     SENNA-docusate sodium (SENNA S) 8.6-50 MG tablet Take 1 tablet  by mouth At Bedtime Take while taking oxycodone to prevent constipation. 24 tablet 0     VITAMIN D, CHOLECALCIFEROL, PO Take 1,000 Units by mouth daily         Physical Examination:  General: The patient is a pleasant female in no acute distress.  /59   Pulse 79   Temp 98.1  F (36.7  C) (Oral)   Resp 14   Wt 74.3 kg (163 lb 11.2 oz)   SpO2 96%   BMI 29.94 kg/m     Wt Readings from Last 10 Encounters:   12/17/19 74.3 kg (163 lb 11.2 oz)   11/21/19 73 kg (160 lb 14.4 oz)   10/29/19 73.5 kg (162 lb)   10/08/19 73.9 kg (162 lb 14.4 oz)   09/13/19 72.6 kg (160 lb)   09/01/19 75.5 kg (166 lb 8 oz)   08/28/19 75.8 kg (167 lb)   08/13/19 76.7 kg (169 lb 1.6 oz)   07/23/19 77.5 kg (170 lb 12.8 oz)   07/02/19 76.7 kg (169 lb 3.2 oz)     HEENT: EOMI, PERRL. Sclerae are anicteric. Oral mucosa is pink and moist with no lesions or thrush. R gingival tissue is well-healed without any ulceration or visible bone.   Lymph: Neck is supple with no lymphadenopathy in the cervical or supraclavicular areas.   Heart: Regular rate and rhythm.   Lungs: Clear to auscultation bilaterally.   Abdomen: Bowel sounds present, soft, nontender with no palpable hepatosplenomegaly or masses.   Extremities: No lower extremity edema noted bilaterally.   Neuro: Cranial nerves II through XII are grossly intact.  Skin: No rashes, petechiae, or bruising noted on exposed skin.    Laboratory Data:    12/17/2019 12:36   Sodium 143   Potassium 4.1   Chloride 110 (H)   Carbon Dioxide 28   Urea Nitrogen 15   Creatinine 0.71   GFR Estimate 90   GFR Estimate If Black >90   Calcium 8.4 (L)   Anion Gap 4   Albumin 3.1 (L)   Protein Total 7.1   Bilirubin Total 0.2   Alkaline Phosphatase 48   ALT 40   AST 37   Glucose 78   WBC 6.6   Hemoglobin 11.2 (L)   Hematocrit 36.6   Platelet Count 160   RBC Count 3.86   MCV 95   MCH 29.0   MCHC 30.6 (L)   RDW 14.5   Diff Method Automated Method   % Neutrophils 53.8   % Lymphocytes 33.6   % Monocytes 8.5   % Eosinophils  3.3   % Basophils 0.5   % Immature Granulocytes 0.3   Nucleated RBCs 0   Absolute Neutrophil 3.6   Absolute Lymphocytes 2.2   Absolute Monocytes 0.6   Absolute Eosinophils 0.2   Absolute Basophils 0.0   Abs Immature Granulocytes 0.0   Absolute Nucleated RBC 0.0   Tumor markers pending    Echo pending     Assessment and Plan:    1. Metastatic breast cancer, ER, PA, HER2+ positive:   Was last treated in [email or web address] with Herceptin. We have continued Herceptin every 3 weeks as well as daily letrozole. CT CAP on 11/18/19 with stable disease.   --Last Echocardiogram on 9/13/19. Done today, result is pending. Follow-up study in 3 months.   - Started letrozole ~10/8. Tolerating well without noticeable side effects besides mild achiness in back in AM.   - Tumor markers pending today but have been stable.   -Follow-up in 6 weeks, herceptin alone in 3 weeks.      2. Bone metastasis: Has previously been on xgeva every 6 weeks. On calcium + vitamin D. Will need to discontinue xgeva due to osteonecrosis of maxilla. This was biopsy proven. Note from oral surgery was scanned in chart.     3. Osteonecrosis: Of R upper maxilla. Tissue is well-healed. She has been given a rinse to use.       4. LUE lymphedema: Has been to lymphedema therapy and has compression sleeve. No recent issues.     5. Muscle cramps/stiffness: Discussed good hydration, stretching, routine exercise. Continue vitamin D.      Anne Lea PA-C  [facility]  [address], [place] [address] [phone]

## 2019-12-17 NOTE — NURSING NOTE
"Oncology Rooming Note    December 17, 2019 12:54 PM   Hoda Brush is a 64 year old female who presents for:    Chief Complaint   Patient presents with     Port Draw     Labs drawn via port by RN in lab. VS taken.      Oncology Clinic Visit     Return; breast Ca     Initial Vitals: /59   Pulse 79   Temp 98.1  F (36.7  C) (Oral)   Resp 14   Wt 74.3 kg (163 lb 11.2 oz)   SpO2 96%   BMI 29.94 kg/m   Estimated body mass index is 29.94 kg/m  as calculated from the following:    Height as of 8/31/19: 1.575 m (5' 2\").    Weight as of this encounter: 74.3 kg (163 lb 11.2 oz). Body surface area is 1.8 meters squared.  No Pain (0) Comment: Data Unavailable   No LMP recorded. Patient is postmenopausal.  Allergies reviewed: Yes  Medications reviewed: Yes    Medications: Medication refills not needed today.  Pharmacy name entered into [employer]: [id number] - [place], [place] - [address] AT  [address] [facility]    Clinical concerns:  PT would like to know if she is getting the xgeva shot; Pt went to the dentist and they think she had a sign effect from holley Wheeler CMA              "

## 2019-12-17 NOTE — PROGRESS NOTES
Infusion Nursing Note:  Hoda Brush presents today for Day 1 Cycle 39 of Herceptin.    Patient seen by provider today: Yes: Anne CHA    present during visit today: Yes, Language: English.     Note: Patient presents to infusion feeling well with no complaints of discomfort, including mouth with history of osteonecrosis of her maxilla. Patient denies acute complaints or concerns not addressed by Anne CHA.     Intravenous Access:  Implanted Port.    Treatment Conditions:  Lab Results   Component Value Date    HGB 11.2 12/17/2019     Lab Results   Component Value Date    WBC 6.6 12/17/2019      Lab Results   Component Value Date    ANEU 3.6 12/17/2019     Lab Results   Component Value Date     12/17/2019      Lab Results   Component Value Date     12/17/2019                   Lab Results   Component Value Date    POTASSIUM 4.1 12/17/2019           No results found for: MAG         Lab Results   Component Value Date    CR 0.71 12/17/2019                   Lab Results   Component Value Date    TAYLER 8.4 12/17/2019                Lab Results   Component Value Date    BILITOTAL 0.2 12/17/2019           Lab Results   Component Value Date    ALBUMIN 3.1 12/17/2019                    Lab Results   Component Value Date    ALT 40 12/17/2019           Lab Results   Component Value Date    AST 37 12/17/2019       ECHO/MUGA completed 12/17/2019. OK to proceed with infusion before official results obtained per Anne CHA. Preliminary Echo results: EF 55-60% with no heart strain      Post Infusion Assessment:  Patient tolerated infusion without incident.  Blood return noted pre and post infusion.  Site patent and intact, free from redness, edema or discomfort.  No evidence of extravasations.  Access discontinued per protocol.       Discharge Plan:   Patient declined prescription refills.  Discharge instructions reviewed with: Patient.  Patient and/or family verbalized  understanding of discharge instructions and all questions answered.  AVS to patient via Dis[employer]T.  Patient will return 1/7 for next appointment.   Patient discharged in stable condition accompanied by: self.  Departure Mode: Ambulatory.  Face to Face time: 0 minutes.    Prakash Benjamin RN

## 2019-12-17 NOTE — PROGRESS NOTES
TORB 12/17/2019 1323 SEMAJ Crockett/WILSON norman RN: No xgeva today. Ok to proceed with Herceptin before today's ECHO results    Steffi Norman RN

## 2020-01-01 NOTE — TELEPHONE ENCOUNTER
ACT No flowsheet data found.    Routing refill request to provider for review/approval because:  Diagnosis not on the [facility] refill protocol Cough [R05]  - Primary     Thank you,  Bret Nunn RN       0

## 2020-01-07 ENCOUNTER — INFUSION THERAPY VISIT (OUTPATIENT)
Dept: ONCOLOGY | Facility: CLINIC | Age: 65
End: 2020-01-07
Attending: INTERNAL MEDICINE
Payer: COMMERCIAL

## 2020-01-07 VITALS
TEMPERATURE: 98 F | BODY MASS INDEX: 30 KG/M2 | DIASTOLIC BLOOD PRESSURE: 53 MMHG | WEIGHT: 164 LBS | OXYGEN SATURATION: 98 % | SYSTOLIC BLOOD PRESSURE: 109 MMHG | HEART RATE: 79 BPM | RESPIRATION RATE: 16 BRPM

## 2020-01-07 DIAGNOSIS — C50.912 MALIGNANT NEOPLASM OF LEFT FEMALE BREAST, UNSPECIFIED ESTROGEN RECEPTOR STATUS, UNSPECIFIED SITE OF BREAST (H): Primary | ICD-10-CM

## 2020-01-07 LAB
ALBUMIN SERPL-MCNC: 3.3 G/DL (ref 3.4–5)
ALP SERPL-CCNC: 48 U/L (ref 40–150)
ALT SERPL W P-5'-P-CCNC: 42 U/L (ref 0–50)
ANION GAP SERPL CALCULATED.3IONS-SCNC: 2 MMOL/L (ref 3–14)
AST SERPL W P-5'-P-CCNC: 28 U/L (ref 0–45)
BASOPHILS # BLD AUTO: 0 10E9/L (ref 0–0.2)
BASOPHILS NFR BLD AUTO: 0.6 %
BILIRUB SERPL-MCNC: 0.4 MG/DL (ref 0.2–1.3)
BUN SERPL-MCNC: 15 MG/DL (ref 7–30)
CALCIUM SERPL-MCNC: 8.7 MG/DL (ref 8.5–10.1)
CANCER AG27-29 SERPL-ACNC: 11 U/ML (ref 0–39)
CEA SERPL-MCNC: <0.5 UG/L (ref 0–2.5)
CHLORIDE SERPL-SCNC: 108 MMOL/L (ref 94–109)
CO2 SERPL-SCNC: 30 MMOL/L (ref 20–32)
CREAT SERPL-MCNC: 0.72 MG/DL (ref 0.52–1.04)
DIFFERENTIAL METHOD BLD: ABNORMAL
EOSINOPHIL # BLD AUTO: 0.2 10E9/L (ref 0–0.7)
EOSINOPHIL NFR BLD AUTO: 3.1 %
ERYTHROCYTE [DISTWIDTH] IN BLOOD BY AUTOMATED COUNT: 14.6 % (ref 10–15)
GFR SERPL CREATININE-BSD FRML MDRD: 88 ML/MIN/{1.73_M2}
GLUCOSE SERPL-MCNC: 80 MG/DL (ref 70–99)
HCT VFR BLD AUTO: 39.5 % (ref 35–47)
HGB BLD-MCNC: 12.1 G/DL (ref 11.7–15.7)
IMM GRANULOCYTES # BLD: 0 10E9/L (ref 0–0.4)
IMM GRANULOCYTES NFR BLD: 0.3 %
LYMPHOCYTES # BLD AUTO: 2.4 10E9/L (ref 0.8–5.3)
LYMPHOCYTES NFR BLD AUTO: 33.1 %
MCH RBC QN AUTO: 29.3 PG (ref 26.5–33)
MCHC RBC AUTO-ENTMCNC: 30.6 G/DL (ref 31.5–36.5)
MCV RBC AUTO: 96 FL (ref 78–100)
MONOCYTES # BLD AUTO: 0.5 10E9/L (ref 0–1.3)
MONOCYTES NFR BLD AUTO: 6.3 %
NEUTROPHILS # BLD AUTO: 4.1 10E9/L (ref 1.6–8.3)
NEUTROPHILS NFR BLD AUTO: 56.6 %
NRBC # BLD AUTO: 0 10*3/UL
NRBC BLD AUTO-RTO: 0 /100
PLATELET # BLD AUTO: 237 10E9/L (ref 150–450)
POTASSIUM SERPL-SCNC: 4.1 MMOL/L (ref 3.4–5.3)
PROT SERPL-MCNC: 7.5 G/DL (ref 6.8–8.8)
RBC # BLD AUTO: 4.13 10E12/L (ref 3.8–5.2)
SODIUM SERPL-SCNC: 140 MMOL/L (ref 133–144)
WBC # BLD AUTO: 7.2 10E9/L (ref 4–11)

## 2020-01-07 PROCEDURE — 25800030 ZZH RX IP 258 OP 636: Mod: ZF | Performed by: PHYSICIAN ASSISTANT

## 2020-01-07 PROCEDURE — 96413 CHEMO IV INFUSION 1 HR: CPT

## 2020-01-07 PROCEDURE — T1013 SIGN LANG/ORAL INTERPRETER: HCPCS | Mod: U3

## 2020-01-07 PROCEDURE — 25000128 H RX IP 250 OP 636: Mod: ZF

## 2020-01-07 PROCEDURE — 85025 COMPLETE CBC W/AUTO DIFF WBC: CPT | Performed by: PHYSICIAN ASSISTANT

## 2020-01-07 PROCEDURE — 86300 IMMUNOASSAY TUMOR CA 15-3: CPT | Performed by: PHYSICIAN ASSISTANT

## 2020-01-07 PROCEDURE — 25000128 H RX IP 250 OP 636: Mod: ZF | Performed by: PHYSICIAN ASSISTANT

## 2020-01-07 PROCEDURE — 80053 COMPREHEN METABOLIC PANEL: CPT | Performed by: PHYSICIAN ASSISTANT

## 2020-01-07 PROCEDURE — 82378 CARCINOEMBRYONIC ANTIGEN: CPT | Performed by: PHYSICIAN ASSISTANT

## 2020-01-07 RX ORDER — CALCIUM CARBONATE 500(1250)
1 TABLET ORAL DAILY
COMMUNITY

## 2020-01-07 RX ORDER — HEPARIN SODIUM (PORCINE) LOCK FLUSH IV SOLN 100 UNIT/ML 100 UNIT/ML
5 SOLUTION INTRAVENOUS EVERY 8 HOURS
Status: DISCONTINUED | OUTPATIENT
Start: 2020-01-07 | End: 2020-01-07 | Stop reason: HOSPADM

## 2020-01-07 RX ADMIN — Medication 5 ML: at 11:47

## 2020-01-07 RX ADMIN — Medication 5 ML: at 09:49

## 2020-01-07 RX ADMIN — TRASTUZUMAB 450 MG: 150 INJECTION, POWDER, LYOPHILIZED, FOR SOLUTION INTRAVENOUS at 11:13

## 2020-01-07 ASSESSMENT — PAIN SCALES - GENERAL: PAINLEVEL: NO PAIN (0)

## 2020-01-07 NOTE — PATIENT INSTRUCTIONS
[facility] Triage and after hours / weekends / holidays:  [phone]    Please call the triage or after hours line if you experience a temperature greater than or equal to 100.5, shaking chills, have uncontrolled nausea, vomiting and/or diarrhea, dizziness, shortness of breath, chest pain, bleeding, unexplained bruising, or if you have any other new/concerning symptoms, questions or concerns.      If you are having any concerning symptoms or wish to speak to a provider before your next infusion visit, please call your care coordinator or triage to notify them so we can adequately serve you.     If you need a refill on a narcotic prescription or other medication, please call before your infusion appointment.         January 2020 Sunday Monday Tuesday Wednesday Thursday Friday Saturday                  1     2     3     4       5     6     7    [facility] LAB DRAW   9:30 AM   (30 min.)    [facility] LAB DRAW   [facility] Lab Draw    P ONC INFUSION 60  10:30 AM   (60 min.)    ONCOLOGY INFUSION   [facility] 8     9     10     11       12     13     14     15     16     17     18       19     20     21     22     23     24     25       26     27     28     29     30    [facility] LAB DRAW   7:45 AM   (15 min.)    [facility] LAB DRAW   [facility] Lab Draw    [facility] RETURN   7:55 AM   (30 min.)   Lisandro Aguiar MD   [facility]    [facility] ONC INFUSION 60   9:00 AM   (60 min.)    ONCOLOGY INFUSION   [facility] 31 February 2020 Sunday Monday Tuesday Wednesday Thursday Friday Saturday                                 1       2     3     4     5     6     7     8       9     10     11     12     13     14     15       16     17     18  Happy Birthday!     19     20     21     22       23     24     25     26     27     28     29                    Recent Results (from the past 24 hour(s))   CBC with platelets  differential    Collection Time: 01/07/20  9:55 AM   Result Value Ref Range    WBC 7.2 4.0 - 11.0 10e9/L    RBC Count 4.13 3.8 - 5.2 10e12/L    Hemoglobin 12.1 11.7 - 15.7 g/dL    Hematocrit 39.5 35.0 - 47.0 %    MCV 96 78 - 100 fl    MCH 29.3 26.5 - 33.0 pg    MCHC 30.6 (L) 31.5 - 36.5 g/dL    RDW 14.6 10.0 - 15.0 %    Platelet Count 237 150 - 450 10e9/L    Diff Method Automated Method     % Neutrophils 56.6 %    % Lymphocytes 33.1 %    % Monocytes 6.3 %    % Eosinophils 3.1 %    % Basophils 0.6 %    % Immature Granulocytes 0.3 %    Nucleated RBCs 0 0 /100    Absolute Neutrophil 4.1 1.6 - 8.3 10e9/L    Absolute Lymphocytes 2.4 0.8 - 5.3 10e9/L    Absolute Monocytes 0.5 0.0 - 1.3 10e9/L    Absolute Eosinophils 0.2 0.0 - 0.7 10e9/L    Absolute Basophils 0.0 0.0 - 0.2 10e9/L    Abs Immature Granulocytes 0.0 0 - 0.4 10e9/L    Absolute Nucleated RBC 0.0    Comprehensive metabolic panel    Collection Time: 01/07/20  9:55 AM   Result Value Ref Range    Sodium 140 133 - 144 mmol/L    Potassium 4.1 3.4 - 5.3 mmol/L    Chloride 108 94 - 109 mmol/L    Carbon Dioxide 30 20 - 32 mmol/L    Anion Gap 2 (L) 3 - 14 mmol/L    Glucose 80 70 - 99 mg/dL    Urea Nitrogen 15 7 - 30 mg/dL    Creatinine 0.72 0.52 - 1.04 mg/dL    GFR Estimate 88 >60 mL/min/[1.73_m2]    GFR Estimate If Black >90 >60 mL/min/[1.73_m2]    Calcium 8.7 8.5 - 10.1 mg/dL    Bilirubin Total 0.4 0.2 - 1.3 mg/dL    Albumin 3.3 (L) 3.4 - 5.0 g/dL    Protein Total 7.5 6.8 - 8.8 g/dL    Alkaline Phosphatase 48 40 - 150 U/L    ALT 42 0 - 50 U/L    AST 28 0 - 45 U/L

## 2020-01-07 NOTE — PROGRESS NOTES
Infusion Nursing Note:  Hoda Brush presents today for cycle 40, day 1 herceptin.    Patient seen by provider today: No   present during visit today: Yes, Language: [place].     Note: Patient states that she is feeling well with no new complaints.    Intravenous Access:  Implanted Port.    Treatment Conditions:  Lab Results   Component Value Date    HGB 12.1 01/07/2020     Lab Results   Component Value Date    WBC 7.2 01/07/2020      Lab Results   Component Value Date    ANEU 4.1 01/07/2020     Lab Results   Component Value Date     01/07/2020      Lab Results   Component Value Date     01/07/2020                   Lab Results   Component Value Date    POTASSIUM 4.1 01/07/2020           No results found for: MAG         Lab Results   Component Value Date    CR 0.72 01/07/2020                   Lab Results   Component Value Date    TAYLER 8.7 01/07/2020                Lab Results   Component Value Date    BILITOTAL 0.4 01/07/2020           Lab Results   Component Value Date    ALBUMIN 3.3 01/07/2020                    Lab Results   Component Value Date    ALT 42 01/07/2020           Lab Results   Component Value Date    AST 28 01/07/2020       Results reviewed, labs MET treatment parameters, ok to proceed with treatment.  ECHO/MUGA completed 12/17/19  EF 55-60%.      Post Infusion Assessment:  Patient tolerated infusion without incident.  Blood return noted pre and post infusion.  Site patent and intact, free from redness, edema or discomfort.  No evidence of extravasations.  Access discontinued per protocol.       Discharge Plan:   Patient declined prescription refills.  Discharge instructions reviewed with: Patient.  Patient and/or family verbalized understanding of discharge instructions and all questions answered.  AVS to patient via LetMeGoT.  Patient will return 1/30/20 for next appointment.   Patient discharged in stable condition accompanied by: self.  Departure Mode:  Ambulatory.  Face to Face time: 0.    Ashlyn Banda RN

## 2020-01-26 NOTE — PROGRESS NOTES
Hoda is seen with a Lebanese . The patient is a refugee from [facility] and is here for continuation of care for her metastatic ER+HER2- breast cancer.  She is a [employer] refugee from [facility] and was seen in clinic with her daughter.  The only data we have from [facility] is an English translation of her records.       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  On 02/11/2014, she was seen by an oncologist.  The clinical staging of T4b N2 M1 was noted.   Her breast cancer was on the left side. There was no right breast cancer, confirmed by the patient and her daughter, correcting a possible error in the translated records.  ER was positive in 100% of the cells, GA at 14% and HER2 was 3+ positive.  It appears that this histopathologic information is on the mastectomy specimen. She underwent an MRI for staging of presumptive bone metastases which was performed 03/02/2014.  There were skeletal metastases in the thoracic vertebrae at 12, L1, L3, L5 and S1 vertebral body, ranging in size from 0.7-3.0 cm in size.  Ischial and right femur were also involved, as well as a large iliac mass measuring about 15 cm in the uterus. 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   Radiation Oncology consultation was performed 03/11/2014, and she was given radiation in 1 dose of 6 Gy to the right iliac lesion.        With the initial diagnosis, she initiated treatment with 6 cycles of CAF neoadjuvant chemotherapy 02/14, 07/07, 03/28, 04/18/14, 05/08 and 05/29/14. She also received monthly zoledronic acid.  She then underwent a modified left mastectomy of Palacios type and left lymphadenoectomy on 06/27/2014.   Pathologic examination showed number at [facility] was [id number] showed infiltrative grade 2 ductal cancer with 6 level 1 metastatic lympFollow up with Jossie for visits and trastuzumab on 2-5, 2-26, 3-19 with CBC, CMP.  Echocardiogram on  3-19.  Follow up with me 4-9 with CBC, CMP, CA27.29 and CEA and with CT CAP on 4-8.h nodes and 3 level 2 metastatic lymph nodes.  The differentiation was intermediate.  The tumor was ER positive 70% of the cells, NH positive in 40% of the cells and HER2 was 3+ by immunohistochemistry and the Ki-67 labeling index was 20%. Her staging after surgery was stage IV, pT4b N2 M1.  I don't see a biopsy of the skeletal metastases.  She then had continuation with chemotherapy with 4 cycles of Herceptin and taxane with monthly zoledronic acid.  Tamoxifen was initiated.  She then had two years of Herceptin and a decision was made not to continue further Herceptin.  She continued on zoledronic acid every 3 months. She was clinically stable. She then [place] to the [place] new refugee from [facility].  She has now been changed to letrozole.  Denosumab has now been held for new diagnosis of osteonecrosis of the R maxilla.      TREATMENT HISTORY:  A. Initial diagnosis with metastatic breast cancer in [place].  Neoadjuvant CAF x 6.   B. Left mastectomy. Left axillary node dissection.  C.  Radiation in 1 dose to R iliac region.  C. Herceptin for 2 years taxane for a prescribed course then stopped, monthly zoledronic acid.  She had 2 years of Herceptin with tamoxifen added after chemotherapy.  D.  Tamoxifen alone and zoledronic acid every 3 months.  [place]  [place]  We restarted Herceptin every 3 weeks and continued tamoxifen. Bone targeted agent changed to denosumab every 9 weeks.        INTERVAL HISTORY:  Ms. Brush comes in today for follow up.   She is having some pain in the mid-abdomen that comes, usually in the mornings before eating, about once per week.  This has been going on for about 4-6 weeks.  When she has the pain, she feels a dull ache in the middle of her abdomen that does not radiate.  No nausea, but she says she could never eat while experiencing the pain.  No black stools or blood in the stool.  When she has  "pain, she takes 3 Advil, which helps.  She is not taking this every day.  No alcohol or acetaminophen.  This pain is not quite the same as her previous pain with gastritis.    A 10-point review of systems was performed and was negative with the exception of pertinent positives noted above.    OBJECTIVE DATA:  Blood pressure 112/68, pulse 80, temperature 97.6  F (36.4  C), temperature source Oral, resp. rate 16, height 1.6 m (5' 2.99\"), weight 76.8 kg (169 lb 4.8 oz), SpO2 95 %.  -- General: no acute distress; well-developed and well-nourished   -- HEENT: mucous membranes moist, no erythema; pupils equally round, reactive  -- Breasts: L mastectomy scar well healed; no masses.  R breast with no masses or overlying skin changes; port site looks good  -- Lymph: no lymphadenopathy in the cervical, submandibular, supraclavicular, axillary, or inguinal areas  -- Cardiovascular: regular rate and rhythm, no murmurs; no peripheral edema or JVD  -- Pulmonary: lungs clear bilaterally, equal diaphragmatic excursion, no wheezes or crackles  -- Gastrointestinal: abdomen soft, non-tender, non-distended; bowel sounds present; no organomegaly  -- Musculoskeletal: no swelling or erythema of joints  -- Integumentary: no rashes  -- Neurologic: alert and oriented to self, place, time; no gross abnormalities  -- Psychiatric: appropriate affect, cooperative    New, relevant results:  Recent Labs   Lab Test 01/30/20  0740 01/07/20  0955    140   POTASSIUM 4.0 4.1   CHLORIDE 109 108   CO2 29 30   ANIONGAP 4 2*   * 80   BUN 12 15   CR 0.69 0.72   TAYLER 8.6 8.7     CBC RESULTS:   Recent Labs   Lab Test 01/30/20  0740   WBC 5.7   RBC 4.07   HGB 12.0   HCT 39.0   MCV 96   MCH 29.5   MCHC 30.8*   RDW 13.9          ASSESSMENT AND PLAN:   1.  Hoda Brush is a 64-year-old woman with a history of ER-positive, PA-positive, HER2-positive breast cancer.  She is from [facility], formally from [facility], [place].  She is here for continuation of every 3-week Herceptin, which she received along with tamoxifen daily.  The tumor was ER positive, HI positive, HER2 positive.  She had metastatic disease at the time of presentation with bone-only metastases by report.  She presented with metastatic disease in 02/2014.  Staging initially showed bone-only metastases.  She had a right hip metastasis.  She underwent neoadjuvant CAF, had a left mastectomy, left axillary lymph node dissection, radiation to the right hip, which included the right iliac region where she had metastatic disease.  She was initially on tamoxifen, but has now been switched to letrozole.  She continues on this and every every-3-week trastuzumab, and has had no evidence of disease progression for the last 2-3/4 years.  Imaging due soon; will order CT chest/abd/pelvis today.  2.  New epigastric pain.  Has had mild gastritis; endoscopy in August 2019.  We wonder if this is worsening.  Will re-refer her for endoscopy.  Original pathology is not available because she was worked up and initially treated in [facility], however if she had lobular pathology, she is at risk for metastasis to the serosa of the stomach.  Gall bladder previously removed for acute gangrenous cholecystitis.  Advised her not to take Advil; prescribed ranitidine 150 mg BID.  3.  Mild transaminase elevation.  Will repeat with next lab draw; unclear cause, but she does not have RUQ pain and elevations are not high enough to delay treatment.  4.  Discussion of hormonal therapy.  After the 5 years of tamoxifen that will be completed in 09/2019, we will change her hormonal therapy to an aromatase inhibitor, which will decrease risk of pulmonary embolus and uterine cancer.  We will continue the letrozole indefinitely.  We will monitor her bone density given her oligometastatic disease, which has been stable for at least 5 years.   5.  Discussion of Bone health & R maxillary  osteonecrosis.  I recommended continuing with calcium and vitamin D.  We have stopped her denosumab for recent onset of osteonecrosis of the R upper maxilla.  6.  Followup.  Return to clinic in 3 weeks on a Tuesday with Dr. Aguiar.  Will recheck liver enzymes and discuss results of restaging CT and endoscopy at that time.  Follow up with me February 25 for Herceptin with CBC, CMP. Upper endoscopy this week. CT CAP this week.    Thank you for allowing us to participate in this patient's care.  The patient was seen and evaluated by me.  I discussed the patient with the fellow and agree with the findings and plan in the note, which was edited by me.    Sincerely,     Lisandro Aguiar MD    [facility]  [phone].        Patient seen and discussed with Dr. Aguiar.    Tierra Smith MD  Hematology-Oncology-Transplant Fellow          I spent 20 minutes with the patient more than 50% of which was in counseling and coordination of care.

## 2020-01-30 ENCOUNTER — INFUSION THERAPY VISIT (OUTPATIENT)
Dept: ONCOLOGY | Facility: CLINIC | Age: 65
End: 2020-01-30
Attending: INTERNAL MEDICINE
Payer: COMMERCIAL

## 2020-01-30 ENCOUNTER — TELEPHONE (OUTPATIENT)
Dept: GASTROENTEROLOGY | Facility: CLINIC | Age: 65
End: 2020-01-30

## 2020-01-30 ENCOUNTER — APPOINTMENT (OUTPATIENT)
Dept: LAB | Facility: CLINIC | Age: 65
End: 2020-01-30
Attending: INTERNAL MEDICINE
Payer: COMMERCIAL

## 2020-01-30 VITALS
RESPIRATION RATE: 16 BRPM | WEIGHT: 169.3 LBS | OXYGEN SATURATION: 95 % | TEMPERATURE: 97.6 F | DIASTOLIC BLOOD PRESSURE: 68 MMHG | SYSTOLIC BLOOD PRESSURE: 112 MMHG | HEART RATE: 80 BPM | BODY MASS INDEX: 30 KG/M2 | HEIGHT: 63 IN

## 2020-01-30 DIAGNOSIS — C50.912 MALIGNANT NEOPLASM OF LEFT FEMALE BREAST, UNSPECIFIED ESTROGEN RECEPTOR STATUS, UNSPECIFIED SITE OF BREAST (H): Primary | ICD-10-CM

## 2020-01-30 DIAGNOSIS — K29.70 GASTRITIS WITHOUT BLEEDING, UNSPECIFIED CHRONICITY, UNSPECIFIED GASTRITIS TYPE: ICD-10-CM

## 2020-01-30 LAB
ALBUMIN SERPL-MCNC: 3.2 G/DL (ref 3.4–5)
ALP SERPL-CCNC: 66 U/L (ref 40–150)
ALT SERPL W P-5'-P-CCNC: 80 U/L (ref 0–50)
ANION GAP SERPL CALCULATED.3IONS-SCNC: 4 MMOL/L (ref 3–14)
AST SERPL W P-5'-P-CCNC: 58 U/L (ref 0–45)
BASOPHILS # BLD AUTO: 0 10E9/L (ref 0–0.2)
BASOPHILS NFR BLD AUTO: 0.3 %
BILIRUB SERPL-MCNC: 0.2 MG/DL (ref 0.2–1.3)
BUN SERPL-MCNC: 12 MG/DL (ref 7–30)
CALCIUM SERPL-MCNC: 8.6 MG/DL (ref 8.5–10.1)
CANCER AG27-29 SERPL-ACNC: 16 U/ML (ref 0–39)
CEA SERPL-MCNC: 0.7 UG/L (ref 0–2.5)
CHLORIDE SERPL-SCNC: 109 MMOL/L (ref 94–109)
CO2 SERPL-SCNC: 29 MMOL/L (ref 20–32)
CREAT SERPL-MCNC: 0.69 MG/DL (ref 0.52–1.04)
DIFFERENTIAL METHOD BLD: ABNORMAL
EOSINOPHIL # BLD AUTO: 0.2 10E9/L (ref 0–0.7)
EOSINOPHIL NFR BLD AUTO: 3.3 %
ERYTHROCYTE [DISTWIDTH] IN BLOOD BY AUTOMATED COUNT: 13.9 % (ref 10–15)
GFR SERPL CREATININE-BSD FRML MDRD: >90 ML/MIN/{1.73_M2}
GLUCOSE SERPL-MCNC: 118 MG/DL (ref 70–99)
HCT VFR BLD AUTO: 39 % (ref 35–47)
HGB BLD-MCNC: 12 G/DL (ref 11.7–15.7)
IMM GRANULOCYTES # BLD: 0 10E9/L (ref 0–0.4)
IMM GRANULOCYTES NFR BLD: 0.2 %
LYMPHOCYTES # BLD AUTO: 1.8 10E9/L (ref 0.8–5.3)
LYMPHOCYTES NFR BLD AUTO: 30.5 %
MCH RBC QN AUTO: 29.5 PG (ref 26.5–33)
MCHC RBC AUTO-ENTMCNC: 30.8 G/DL (ref 31.5–36.5)
MCV RBC AUTO: 96 FL (ref 78–100)
MONOCYTES # BLD AUTO: 0.3 10E9/L (ref 0–1.3)
MONOCYTES NFR BLD AUTO: 5.2 %
NEUTROPHILS # BLD AUTO: 3.5 10E9/L (ref 1.6–8.3)
NEUTROPHILS NFR BLD AUTO: 60.5 %
NRBC # BLD AUTO: 0 10*3/UL
NRBC BLD AUTO-RTO: 0 /100
PLATELET # BLD AUTO: 228 10E9/L (ref 150–450)
POTASSIUM SERPL-SCNC: 4 MMOL/L (ref 3.4–5.3)
PROT SERPL-MCNC: 7.2 G/DL (ref 6.8–8.8)
RBC # BLD AUTO: 4.07 10E12/L (ref 3.8–5.2)
SODIUM SERPL-SCNC: 142 MMOL/L (ref 133–144)
WBC # BLD AUTO: 5.7 10E9/L (ref 4–11)

## 2020-01-30 PROCEDURE — 82378 CARCINOEMBRYONIC ANTIGEN: CPT | Performed by: INTERNAL MEDICINE

## 2020-01-30 PROCEDURE — 80053 COMPREHEN METABOLIC PANEL: CPT | Performed by: INTERNAL MEDICINE

## 2020-01-30 PROCEDURE — 25000128 H RX IP 250 OP 636: Mod: ZF | Performed by: INTERNAL MEDICINE

## 2020-01-30 PROCEDURE — 96413 CHEMO IV INFUSION 1 HR: CPT

## 2020-01-30 PROCEDURE — 86300 IMMUNOASSAY TUMOR CA 15-3: CPT | Performed by: INTERNAL MEDICINE

## 2020-01-30 PROCEDURE — 85025 COMPLETE CBC W/AUTO DIFF WBC: CPT | Performed by: INTERNAL MEDICINE

## 2020-01-30 PROCEDURE — 25800030 ZZH RX IP 258 OP 636: Mod: ZF | Performed by: INTERNAL MEDICINE

## 2020-01-30 PROCEDURE — G0463 HOSPITAL OUTPT CLINIC VISIT: HCPCS | Mod: ZF

## 2020-01-30 PROCEDURE — 99214 OFFICE O/P EST MOD 30 MIN: CPT | Mod: ZP | Performed by: INTERNAL MEDICINE

## 2020-01-30 RX ORDER — EPINEPHRINE 0.3 MG/.3ML
0.3 INJECTION SUBCUTANEOUS EVERY 5 MIN PRN
Status: CANCELLED | OUTPATIENT
Start: 2020-02-01

## 2020-01-30 RX ORDER — HEPARIN SODIUM (PORCINE) LOCK FLUSH IV SOLN 100 UNIT/ML 100 UNIT/ML
5 SOLUTION INTRAVENOUS EVERY 8 HOURS
Status: CANCELLED | OUTPATIENT
Start: 2020-02-01

## 2020-01-30 RX ORDER — MEPERIDINE HYDROCHLORIDE 25 MG/ML
25 INJECTION INTRAMUSCULAR; INTRAVENOUS; SUBCUTANEOUS EVERY 30 MIN PRN
Status: CANCELLED | OUTPATIENT
Start: 2020-02-01

## 2020-01-30 RX ORDER — METHYLPREDNISOLONE SODIUM SUCCINATE 125 MG/2ML
125 INJECTION, POWDER, LYOPHILIZED, FOR SOLUTION INTRAMUSCULAR; INTRAVENOUS
Status: CANCELLED
Start: 2020-02-01

## 2020-01-30 RX ORDER — ACETAMINOPHEN 325 MG/1
650 TABLET ORAL
Status: CANCELLED | OUTPATIENT
Start: 2020-02-01

## 2020-01-30 RX ORDER — DIPHENHYDRAMINE HYDROCHLORIDE 50 MG/ML
50 INJECTION INTRAMUSCULAR; INTRAVENOUS
Status: CANCELLED
Start: 2020-02-01

## 2020-01-30 RX ORDER — ALBUTEROL SULFATE 0.83 MG/ML
2.5 SOLUTION RESPIRATORY (INHALATION)
Status: CANCELLED | OUTPATIENT
Start: 2020-02-01

## 2020-01-30 RX ORDER — EPINEPHRINE 1 MG/ML
0.3 INJECTION, SOLUTION INTRAMUSCULAR; SUBCUTANEOUS EVERY 5 MIN PRN
Status: CANCELLED | OUTPATIENT
Start: 2020-02-01

## 2020-01-30 RX ORDER — DIPHENHYDRAMINE HCL 25 MG
50 CAPSULE ORAL ONCE
Status: CANCELLED
Start: 2020-02-01

## 2020-01-30 RX ORDER — SODIUM CHLORIDE 9 MG/ML
1000 INJECTION, SOLUTION INTRAVENOUS CONTINUOUS PRN
Status: CANCELLED
Start: 2020-02-01

## 2020-01-30 RX ORDER — LORAZEPAM 2 MG/ML
0.5 INJECTION INTRAMUSCULAR EVERY 4 HOURS PRN
Status: CANCELLED
Start: 2020-02-01

## 2020-01-30 RX ORDER — HEPARIN SODIUM (PORCINE) LOCK FLUSH IV SOLN 100 UNIT/ML 100 UNIT/ML
5 SOLUTION INTRAVENOUS ONCE
Status: COMPLETED | OUTPATIENT
Start: 2020-01-30 | End: 2020-01-30

## 2020-01-30 RX ORDER — ALBUTEROL SULFATE 90 UG/1
1-2 AEROSOL, METERED RESPIRATORY (INHALATION)
Status: CANCELLED
Start: 2020-02-01

## 2020-01-30 RX ORDER — HEPARIN SODIUM (PORCINE) LOCK FLUSH IV SOLN 100 UNIT/ML 100 UNIT/ML
5 SOLUTION INTRAVENOUS EVERY 8 HOURS
Status: DISCONTINUED | OUTPATIENT
Start: 2020-01-30 | End: 2020-01-30 | Stop reason: HOSPADM

## 2020-01-30 RX ADMIN — TRASTUZUMAB 450 MG: 150 INJECTION, POWDER, LYOPHILIZED, FOR SOLUTION INTRAVENOUS at 09:57

## 2020-01-30 RX ADMIN — Medication 5 ML: at 07:38

## 2020-01-30 RX ADMIN — Medication 5 ML: at 10:27

## 2020-01-30 RX ADMIN — SODIUM CHLORIDE 250 ML: 9 INJECTION, SOLUTION INTRAVENOUS at 09:57

## 2020-01-30 ASSESSMENT — MIFFLIN-ST. JEOR: SCORE: 1291.91

## 2020-01-30 ASSESSMENT — PAIN SCALES - GENERAL: PAINLEVEL: NO PAIN (0)

## 2020-01-30 NOTE — NURSING NOTE
"Oncology Rooming Note    January 30, 2020 8:15 AM   Hoda Brush is a 64 year old female who presents for:    Chief Complaint   Patient presents with     Port Draw     labs drawn via port by rn. vs taken     Oncology Clinic Visit     [facility] RETURN; METASTATIC BREAST CANCER     Initial Vitals: /68 (BP Location: Right arm, Patient Position: Sitting, Cuff Size: Adult Regular)   Pulse 80   Temp 97.6  F (36.4  C) (Oral)   Resp 16   Ht 1.6 m (5' 2.99\")   Wt 76.8 kg (169 lb 4.8 oz)   SpO2 95%   BMI 30.00 kg/m   Estimated body mass index is 30 kg/m  as calculated from the following:    Height as of this encounter: 1.6 m (5' 2.99\").    Weight as of this encounter: 76.8 kg (169 lb 4.8 oz). Body surface area is 1.85 meters squared.  No Pain (0) Comment: Data Unavailable   No LMP recorded. Patient is postmenopausal.  Allergies reviewed: Yes  Medications reviewed: Yes    Medications: MEDICATION REFILLS NEEDED TODAY. Provider was notified.  Pharmacy name entered into [employer]: [id number] - [place], [place] - [address] AT  [address] [facility]    Clinical concerns: Refill on melatonin.  Pain in abdomen, 7-8/10 on pain scale.  Dr. Aguiar was notified.      Nathalie Elias, [facility]              "

## 2020-01-30 NOTE — PROGRESS NOTES
Infusion Nursing Note:  Hoda Brush presents today for Cycle 41 Day 1 Herceptin.    Patient seen by provider today: Yes: Dr. Aguiar   present during visit today: Yes, Language: Cypriot.     Note: N/A.    Pain: denies    Intravenous Access:  Implanted Port.    Treatment Conditions:  Lab Results   Component Value Date    HGB 12.0 01/30/2020     Lab Results   Component Value Date    WBC 5.7 01/30/2020      Lab Results   Component Value Date    ANEU 3.5 01/30/2020     Lab Results   Component Value Date     01/30/2020      Lab Results   Component Value Date     01/30/2020                   Lab Results   Component Value Date    POTASSIUM 4.0 01/30/2020           No results found for: MAG         Lab Results   Component Value Date    CR 0.69 01/30/2020                   Lab Results   Component Value Date    TAYLER 8.6 01/30/2020                Lab Results   Component Value Date    BILITOTAL 0.2 01/30/2020           Lab Results   Component Value Date    ALBUMIN 3.2 01/30/2020                    Lab Results   Component Value Date    ALT 80 01/30/2020           Lab Results   Component Value Date    AST 58 01/30/2020       Results reviewed, labs MET treatment parameters, ok to proceed with treatment.  ECHO/MUGA completed 12/17/19  EF 55-60%.      Post Infusion Assessment:  Patient tolerated infusion without incident.  Blood return noted pre and post infusion.  Site patent and intact, free from redness, edema or discomfort.  No evidence of extravasations.  Access discontinued per protocol.       Discharge Plan:   Patient declined prescription refills.  Copy of AVS reviewed with patient and/or family.  Patient will return 2/25 for next appointment.  Patient discharged in stable condition accompanied by: .  Departure Mode: Ambulatory.    MICHELINE SHANKS RN

## 2020-01-30 NOTE — TELEPHONE ENCOUNTER
Patient Name: Hoda Brush   : 1955  MRN: [id number]       :  Russian (Pt)    Rafat Active    Additional Information regarding appointment:  _____________________________________________________      Patient scheduled for:  EGD    Indication for procedure. Malignant neoplasm of left female breast, unspecified estrogen receptor status, unspecified site of breast     Sedation Type: C/S    Procedure Provider: Celeste    Referring Provider. Tressa Webb     Arrival time verified: .2020    Facility location verified:   [place] [address]  1st Floor, [address]    [x] N/A for this Payor (non-[place] BCBS)    Pt meets medical necessity for outpatient procedure in hospital Endoscopy Unit: N/A      History & Physical: [x] N/A     Additional Information: Shyla Stover (Daughter) CBO Authorization confirmed prior to call    Port - may access for procedure    Breast Cancer /c bone mets lower spine et Rt hip  __________________________________________________      Prep Type:   [x] NPO /p 0600, No solid food /p 2200 the night before      Patient taking any blood thinners ? Yes  Anticoagulants or blood thinners:           [x] NSAIDS  - may continue     ................................................        Electronic implanted medical devices ? No      GI / Hepatology History: Yes: See EPIC      Heart disease ? No      Lung disease ? No      Sleep apnea ? No      Diabetic ? No      Kidney disease ? No      Instructions given:  [x] Reviewed     [x] Resent via [employer] - This includes: EGD  instructions, Conscious Sedation policy, procedure date/time/location/provider.          Pre procedure teaching completed: [x] Yes - Reviewed      IV Sedation: [x] No questions regarding Sedation as ordered       : Transportation from procedure & responsible adult to be with patient following procedure for a minimum of  6 hrs (Conscious Sedation): [x] Daughter - confirmed will have post-procedure companionship as required    _______________________________________________    Maria Fernanda Núñez RN  [facility] Endoscopy

## 2020-01-30 NOTE — NURSING NOTE
"Chief Complaint   Patient presents with     Port Draw     labs drawn via port by rn. vs taken     Port accessed by RN with 20 G 3/4\" gripper needle, labs drawn from port in lab. Flushed with saline and heparin. VS taken. Pt checked into next appointment.   Angie Perez, JAS     "

## 2020-01-30 NOTE — PATIENT INSTRUCTIONS
Contact Numbers    [facility] Main Line: [phone]  [facility] Triage and after hours / weekends / holidays: [phone]      Please call the triage or after hours line if you experience a temperature greater than or equal to 100.5, shaking chills, have uncontrolled nausea, vomiting and/or diarrhea, dizziness, shortness of breath, chest pain, bleeding, unexplained bruising, or if you have any other new/concerning symptoms, questions or concerns.      If you are having any concerning symptoms or wish to speak to a provider before your next infusion visit, please call your care coordinator or triage to notify them so we can adequately serve you.     If you need a refill on a narcotic prescription or other medication, please call before your infusion appointment.       January 2020 Sunday Monday Tuesday Wednesday Thursday Friday Saturday                  1     2     3     4       5     6     7    [facility] DRAW   9:30 AM   (30 min.)    [facility] DRAW   [facility] Lab Draw    [facility] ONC INFUSION 60  10:30 AM   (60 min.)    ONCOLOGY INFUSION   [facility] 8     9     10     11       12     13     14     15     16     17     18       19     20     21     22     23     24     25       26     27     28     29     30    [facility] DRAW   7:45 AM   (30 min.)    [facility] DRAW   [facility] Lab Draw    [facility] RETURN   7:55 AM   (90 min.)   Lisandro Aguiar MD   [facility]    [facility] ONC INFUSION 60   9:00 AM   (90 min.)    ONCOLOGY INFUSION   [facility] 31    CT CHEST/ABDOMEN/PELVIS W   2:40 PM   (20 min.)   UCCT1   [facility] CT                  February 2020 Sunday Monday Tuesday Wednesday Thursday Friday Saturday                                 1       2     3     4     5      12:00 PM   (180 min.)   Nichole Agustin MD    Services Department    ESOPHAGOGASTRODUODENOSCOPY (EGD)   1:00 PM    Anthony Alonso MD    GI 6     7     8       9     10     11     12     13     14     15       16     17     18  Happy Birthday!     19     20     21     22       23     24     25    [facility]  11:00 AM   (15 min.)   [facility] DRAW   [facility] Lab Draw    [facility] RETURN  11:45 AM   (30 min.)   Lisandro Aguiar MD   [facility] ONC INFUSION 90   1:00 PM   (90 min.)    ONCOLOGY INFUSION   [facility] 26     27     28     29                    Recent Results (from the past 24 hour(s))   CBC with platelets differential    Collection Time: 01/30/20  7:40 AM   Result Value Ref Range    WBC 5.7 4.0 - 11.0 10e9/L    RBC Count 4.07 3.8 - 5.2 10e12/L    Hemoglobin 12.0 11.7 - 15.7 g/dL    Hematocrit 39.0 35.0 - 47.0 %    MCV 96 78 - 100 fl    MCH 29.5 26.5 - 33.0 pg    MCHC 30.8 (L) 31.5 - 36.5 g/dL    RDW 13.9 10.0 - 15.0 %    Platelet Count 228 150 - 450 10e9/L    Diff Method Automated Method     % Neutrophils 60.5 %    % Lymphocytes 30.5 %    % Monocytes 5.2 %    % Eosinophils 3.3 %    % Basophils 0.3 %    % Immature Granulocytes 0.2 %    Nucleated RBCs 0 0 /100    Absolute Neutrophil 3.5 1.6 - 8.3 10e9/L    Absolute Lymphocytes 1.8 0.8 - 5.3 10e9/L    Absolute Monocytes 0.3 0.0 - 1.3 10e9/L    Absolute Eosinophils 0.2 0.0 - 0.7 10e9/L    Absolute Basophils 0.0 0.0 - 0.2 10e9/L    Abs Immature Granulocytes 0.0 0 - 0.4 10e9/L    Absolute Nucleated RBC 0.0    Comprehensive metabolic panel    Collection Time: 01/30/20  7:40 AM   Result Value Ref Range    Sodium 142 133 - 144 mmol/L    Potassium 4.0 3.4 - 5.3 mmol/L    Chloride 109 94 - 109 mmol/L    Carbon Dioxide 29 20 - 32 mmol/L    Anion Gap 4 3 - 14 mmol/L    Glucose 118 (H) 70 - 99 mg/dL    Urea Nitrogen 12 7 - 30 mg/dL    Creatinine 0.69 0.52 - 1.04 mg/dL    GFR Estimate >90 >60 mL/min/[1.73_m2]    GFR Estimate If Black >90 >60 mL/min/[1.73_m2]    Calcium 8.6 8.5 - 10.1 mg/dL    Bilirubin  Total 0.2 0.2 - 1.3 mg/dL    Albumin 3.2 (L) 3.4 - 5.0 g/dL    Protein Total 7.2 6.8 - 8.8 g/dL    Alkaline Phosphatase 66 40 - 150 U/L    ALT 80 (H) 0 - 50 U/L    AST 58 (H) 0 - 45 U/L

## 2020-02-03 ENCOUNTER — ANCILLARY PROCEDURE (OUTPATIENT)
Dept: CT IMAGING | Facility: CLINIC | Age: 65
End: 2020-02-03
Attending: INTERNAL MEDICINE
Payer: COMMERCIAL

## 2020-02-03 ENCOUNTER — TELEPHONE (OUTPATIENT)
Dept: ONCOLOGY | Facility: CLINIC | Age: 65
End: 2020-02-03

## 2020-02-03 DIAGNOSIS — C50.912 MALIGNANT NEOPLASM OF LEFT FEMALE BREAST, UNSPECIFIED ESTROGEN RECEPTOR STATUS, UNSPECIFIED SITE OF BREAST (H): ICD-10-CM

## 2020-02-03 RX ORDER — HEPARIN SODIUM (PORCINE) LOCK FLUSH IV SOLN 100 UNIT/ML 100 UNIT/ML
5 SOLUTION INTRAVENOUS ONCE
Status: COMPLETED | OUTPATIENT
Start: 2020-02-03 | End: 2020-02-03

## 2020-02-03 RX ORDER — IOPAMIDOL 755 MG/ML
104 INJECTION, SOLUTION INTRAVASCULAR ONCE
Status: COMPLETED | OUTPATIENT
Start: 2020-02-03 | End: 2020-02-03

## 2020-02-03 RX ADMIN — IOPAMIDOL 104 ML: 755 INJECTION, SOLUTION INTRAVASCULAR at 11:36

## 2020-02-03 RX ADMIN — HEPARIN SODIUM (PORCINE) LOCK FLUSH IV SOLN 100 UNIT/ML 5 ML: 100 SOLUTION at 11:38

## 2020-02-05 ENCOUNTER — HOSPITAL ENCOUNTER (OUTPATIENT)
Facility: CLINIC | Age: 65
Discharge: HOME | End: 2020-02-05
Attending: INTERNAL MEDICINE | Admitting: INTERNAL MEDICINE
Payer: COMMERCIAL

## 2020-02-05 ENCOUNTER — OFFICE VISIT (OUTPATIENT)
Dept: INTERPRETER SERVICES | Facility: CLINIC | Age: 65
End: 2020-02-05
Payer: COMMERCIAL

## 2020-02-05 VITALS
HEART RATE: 73 BPM | WEIGHT: 169 LBS | HEIGHT: 63 IN | BODY MASS INDEX: 29.95 KG/M2 | OXYGEN SATURATION: 94 % | SYSTOLIC BLOOD PRESSURE: 104 MMHG | RESPIRATION RATE: 13 BRPM | DIASTOLIC BLOOD PRESSURE: 69 MMHG

## 2020-02-05 LAB — UPPER GI ENDOSCOPY: NORMAL

## 2020-02-05 PROCEDURE — G0500 MOD SEDAT ENDO SERVICE >5YRS: HCPCS | Performed by: INTERNAL MEDICINE

## 2020-02-05 PROCEDURE — T1013 SIGN LANG/ORAL INTERPRETER: HCPCS | Mod: U3

## 2020-02-05 PROCEDURE — 25000128 H RX IP 250 OP 636: Performed by: INTERNAL MEDICINE

## 2020-02-05 PROCEDURE — 43239 EGD BIOPSY SINGLE/MULTIPLE: CPT | Performed by: INTERNAL MEDICINE

## 2020-02-05 PROCEDURE — 40000104 ZZH STATISTIC MODERATE SEDATION < 10 MIN

## 2020-02-05 PROCEDURE — 43235 EGD DIAGNOSTIC BRUSH WASH: CPT | Performed by: INTERNAL MEDICINE

## 2020-02-05 PROCEDURE — 88305 TISSUE EXAM BY PATHOLOGIST: CPT | Performed by: INTERNAL MEDICINE

## 2020-02-05 RX ORDER — ONDANSETRON 2 MG/ML
4 INJECTION INTRAMUSCULAR; INTRAVENOUS EVERY 6 HOURS PRN
Status: DISCONTINUED | OUTPATIENT
Start: 2020-02-05 | End: 2020-02-05 | Stop reason: HOSPADM

## 2020-02-05 RX ORDER — ONDANSETRON 4 MG/1
4 TABLET, ORALLY DISINTEGRATING ORAL EVERY 6 HOURS PRN
Status: DISCONTINUED | OUTPATIENT
Start: 2020-02-05 | End: 2020-02-05 | Stop reason: HOSPADM

## 2020-02-05 RX ORDER — FLUMAZENIL 0.1 MG/ML
0.2 INJECTION, SOLUTION INTRAVENOUS
Status: DISCONTINUED | OUTPATIENT
Start: 2020-02-05 | End: 2020-02-05 | Stop reason: HOSPADM

## 2020-02-05 RX ORDER — NALOXONE HYDROCHLORIDE 0.4 MG/ML
.1-.4 INJECTION, SOLUTION INTRAMUSCULAR; INTRAVENOUS; SUBCUTANEOUS
Status: DISCONTINUED | OUTPATIENT
Start: 2020-02-05 | End: 2020-02-05 | Stop reason: HOSPADM

## 2020-02-05 RX ORDER — ONDANSETRON 2 MG/ML
4 INJECTION INTRAMUSCULAR; INTRAVENOUS
Status: DISCONTINUED | OUTPATIENT
Start: 2020-02-05 | End: 2020-02-05 | Stop reason: HOSPADM

## 2020-02-05 RX ORDER — FENTANYL CITRATE 50 UG/ML
INJECTION, SOLUTION INTRAMUSCULAR; INTRAVENOUS PRN
Status: DISCONTINUED | OUTPATIENT
Start: 2020-02-05 | End: 2020-02-05 | Stop reason: HOSPADM

## 2020-02-05 RX ORDER — LIDOCAINE 40 MG/G
CREAM TOPICAL
Status: DISCONTINUED | OUTPATIENT
Start: 2020-02-05 | End: 2020-02-05 | Stop reason: HOSPADM

## 2020-02-05 ASSESSMENT — MIFFLIN-ST. JEOR: SCORE: 1290.71

## 2020-02-05 NOTE — DISCHARGE INSTRUCTIONS
Discharge Instructions after Upper Endoscopy (EGD)    Activity and Diet  You were given medicine for pain. You may be dizzy or sleepy.  For 24 hours:    Do not drive or use heavy equipment.    Do not make important decisions.    Do not drink any alcohol.  You may return to your regular diet.    Discomfort  You may have a sore throat for 2 to 3 days. It may help to:    Avoid hot liquids for 24 hours.    Use sore throat lozenges.    Gargle as needed with salt water up to 4 times a day. Mix 1 cup of warm water  with 1 teaspoon of salt. Do not swallow.    You may take Tylenol (acetaminophen) for pain unless your doctor has told you not to.    Do not take aspirin or ibuprofen (Advil, Motrin) or other NSAIDS  (anti-inflammatory drugs) for 3 days.    Follow-up  We took small tissue samples for study. If you do not have a follow-up visit scheduled,  call your provider s office in 2 weeks for the results.      When to call us:  Problems are rare. Call right away if you have:    Unusual throat pain or trouble swallowing    Unusual pain in belly or chest that is not relieved by belching or passing air    Black stools (tar-like looking bowel movement)    Temperature above 100.6  F. (37.5  C).    If you vomit blood or have severe pain, go to an emergency room.    If you have questions, call:  Monday to Friday, 7 a.m. to 4:30 p.m.: Endoscopy: [phone] (We may have to call you back)    After hours: Hospital: [phone] (Ask for the GI fellow on call)

## 2020-02-05 NOTE — OR NURSING
Port-a-cath de-accessed after infusion of Heparin Lock Flush Solution, 500 USP units/5 ml.   No bleeding after needle was removed. Dressing applied.

## 2020-02-06 LAB — COPATH REPORT: NORMAL

## 2020-02-25 ENCOUNTER — INFUSION THERAPY VISIT (OUTPATIENT)
Dept: ONCOLOGY | Facility: CLINIC | Age: 65
End: 2020-02-25
Attending: INTERNAL MEDICINE
Payer: COMMERCIAL

## 2020-02-25 ENCOUNTER — APPOINTMENT (OUTPATIENT)
Dept: LAB | Facility: CLINIC | Age: 65
End: 2020-02-25
Attending: INTERNAL MEDICINE
Payer: COMMERCIAL

## 2020-02-25 VITALS
HEART RATE: 79 BPM | TEMPERATURE: 98.3 F | DIASTOLIC BLOOD PRESSURE: 60 MMHG | OXYGEN SATURATION: 97 % | RESPIRATION RATE: 14 BRPM | WEIGHT: 171 LBS | HEIGHT: 63 IN | SYSTOLIC BLOOD PRESSURE: 122 MMHG | BODY MASS INDEX: 30.3 KG/M2

## 2020-02-25 DIAGNOSIS — K29.00 ACUTE SUPERFICIAL GASTRITIS WITHOUT HEMORRHAGE: Primary | ICD-10-CM

## 2020-02-25 DIAGNOSIS — C50.912 MALIGNANT NEOPLASM OF LEFT FEMALE BREAST, UNSPECIFIED ESTROGEN RECEPTOR STATUS, UNSPECIFIED SITE OF BREAST (H): Primary | ICD-10-CM

## 2020-02-25 DIAGNOSIS — C50.912 MALIGNANT NEOPLASM OF LEFT FEMALE BREAST, UNSPECIFIED ESTROGEN RECEPTOR STATUS, UNSPECIFIED SITE OF BREAST (H): ICD-10-CM

## 2020-02-25 LAB
ALBUMIN SERPL-MCNC: 3.3 G/DL (ref 3.4–5)
ALP SERPL-CCNC: 50 U/L (ref 40–150)
ALT SERPL W P-5'-P-CCNC: 44 U/L (ref 0–50)
ANION GAP SERPL CALCULATED.3IONS-SCNC: 3 MMOL/L (ref 3–14)
AST SERPL W P-5'-P-CCNC: 31 U/L (ref 0–45)
BASOPHILS # BLD AUTO: 0.1 10E9/L (ref 0–0.2)
BASOPHILS NFR BLD AUTO: 0.8 %
BILIRUB SERPL-MCNC: 0.3 MG/DL (ref 0.2–1.3)
BUN SERPL-MCNC: 15 MG/DL (ref 7–30)
CALCIUM SERPL-MCNC: 8.7 MG/DL (ref 8.5–10.1)
CANCER AG27-29 SERPL-ACNC: 12 U/ML (ref 0–39)
CEA SERPL-MCNC: <0.5 UG/L (ref 0–2.5)
CHLORIDE SERPL-SCNC: 108 MMOL/L (ref 94–109)
CO2 SERPL-SCNC: 30 MMOL/L (ref 20–32)
CREAT SERPL-MCNC: 0.69 MG/DL (ref 0.52–1.04)
DIFFERENTIAL METHOD BLD: ABNORMAL
EOSINOPHIL # BLD AUTO: 0.3 10E9/L (ref 0–0.7)
EOSINOPHIL NFR BLD AUTO: 4.5 %
ERYTHROCYTE [DISTWIDTH] IN BLOOD BY AUTOMATED COUNT: 13.7 % (ref 10–15)
GFR SERPL CREATININE-BSD FRML MDRD: >90 ML/MIN/{1.73_M2}
GLUCOSE SERPL-MCNC: 98 MG/DL (ref 70–99)
HCT VFR BLD AUTO: 38.8 % (ref 35–47)
HGB BLD-MCNC: 12 G/DL (ref 11.7–15.7)
IMM GRANULOCYTES # BLD: 0 10E9/L (ref 0–0.4)
IMM GRANULOCYTES NFR BLD: 0.2 %
LYMPHOCYTES # BLD AUTO: 2.4 10E9/L (ref 0.8–5.3)
LYMPHOCYTES NFR BLD AUTO: 39.2 %
MCH RBC QN AUTO: 29.3 PG (ref 26.5–33)
MCHC RBC AUTO-ENTMCNC: 30.9 G/DL (ref 31.5–36.5)
MCV RBC AUTO: 95 FL (ref 78–100)
MONOCYTES # BLD AUTO: 0.5 10E9/L (ref 0–1.3)
MONOCYTES NFR BLD AUTO: 8.2 %
NEUTROPHILS # BLD AUTO: 2.9 10E9/L (ref 1.6–8.3)
NEUTROPHILS NFR BLD AUTO: 47.1 %
NRBC # BLD AUTO: 0 10*3/UL
NRBC BLD AUTO-RTO: 0 /100
PLATELET # BLD AUTO: 211 10E9/L (ref 150–450)
POTASSIUM SERPL-SCNC: 3.8 MMOL/L (ref 3.4–5.3)
PROT SERPL-MCNC: 7.4 G/DL (ref 6.8–8.8)
RBC # BLD AUTO: 4.1 10E12/L (ref 3.8–5.2)
SODIUM SERPL-SCNC: 141 MMOL/L (ref 133–144)
WBC # BLD AUTO: 6.2 10E9/L (ref 4–11)

## 2020-02-25 PROCEDURE — 82378 CARCINOEMBRYONIC ANTIGEN: CPT | Performed by: INTERNAL MEDICINE

## 2020-02-25 PROCEDURE — 85025 COMPLETE CBC W/AUTO DIFF WBC: CPT | Performed by: INTERNAL MEDICINE

## 2020-02-25 PROCEDURE — 25000128 H RX IP 250 OP 636: Mod: ZF | Performed by: INTERNAL MEDICINE

## 2020-02-25 PROCEDURE — G0463 HOSPITAL OUTPT CLINIC VISIT: HCPCS | Mod: ZF

## 2020-02-25 PROCEDURE — 86300 IMMUNOASSAY TUMOR CA 15-3: CPT | Performed by: INTERNAL MEDICINE

## 2020-02-25 PROCEDURE — 80053 COMPREHEN METABOLIC PANEL: CPT | Performed by: INTERNAL MEDICINE

## 2020-02-25 PROCEDURE — 99215 OFFICE O/P EST HI 40 MIN: CPT | Mod: ZP | Performed by: INTERNAL MEDICINE

## 2020-02-25 PROCEDURE — 96413 CHEMO IV INFUSION 1 HR: CPT

## 2020-02-25 PROCEDURE — 25800030 ZZH RX IP 258 OP 636: Mod: ZF | Performed by: INTERNAL MEDICINE

## 2020-02-25 RX ORDER — SODIUM CHLORIDE 9 MG/ML
1000 INJECTION, SOLUTION INTRAVENOUS CONTINUOUS PRN
Status: CANCELLED
Start: 2020-02-25

## 2020-02-25 RX ORDER — HEPARIN SODIUM (PORCINE) LOCK FLUSH IV SOLN 100 UNIT/ML 100 UNIT/ML
5 SOLUTION INTRAVENOUS EVERY 8 HOURS
Status: CANCELLED | OUTPATIENT
Start: 2020-02-25

## 2020-02-25 RX ORDER — LORAZEPAM 2 MG/ML
0.5 INJECTION INTRAMUSCULAR EVERY 4 HOURS PRN
Status: CANCELLED
Start: 2020-02-25

## 2020-02-25 RX ORDER — ALBUTEROL SULFATE 90 UG/1
1-2 AEROSOL, METERED RESPIRATORY (INHALATION)
Status: CANCELLED
Start: 2020-02-25

## 2020-02-25 RX ORDER — HEPARIN SODIUM (PORCINE) LOCK FLUSH IV SOLN 100 UNIT/ML 100 UNIT/ML
5 SOLUTION INTRAVENOUS DAILY PRN
Status: DISCONTINUED | OUTPATIENT
Start: 2020-02-25 | End: 2020-03-01 | Stop reason: HOSPADM

## 2020-02-25 RX ORDER — METHYLPREDNISOLONE SODIUM SUCCINATE 125 MG/2ML
125 INJECTION, POWDER, LYOPHILIZED, FOR SOLUTION INTRAMUSCULAR; INTRAVENOUS
Status: CANCELLED
Start: 2020-02-25

## 2020-02-25 RX ORDER — DIPHENHYDRAMINE HCL 25 MG
50 CAPSULE ORAL ONCE
Status: CANCELLED
Start: 2020-02-25

## 2020-02-25 RX ORDER — NICOTINE POLACRILEX 4 MG/1
20 GUM, CHEWING ORAL DAILY
Qty: 90 TABLET | Refills: 2 | Status: SHIPPED | OUTPATIENT
Start: 2020-02-25 | End: 2020-11-24

## 2020-02-25 RX ORDER — ALBUTEROL SULFATE 0.83 MG/ML
2.5 SOLUTION RESPIRATORY (INHALATION)
Status: CANCELLED | OUTPATIENT
Start: 2020-02-25

## 2020-02-25 RX ORDER — EPINEPHRINE 0.3 MG/.3ML
0.3 INJECTION SUBCUTANEOUS EVERY 5 MIN PRN
Status: CANCELLED | OUTPATIENT
Start: 2020-02-25

## 2020-02-25 RX ORDER — EPINEPHRINE 1 MG/ML
0.3 INJECTION, SOLUTION INTRAMUSCULAR; SUBCUTANEOUS EVERY 5 MIN PRN
Status: CANCELLED | OUTPATIENT
Start: 2020-02-25

## 2020-02-25 RX ORDER — HEPARIN SODIUM (PORCINE) LOCK FLUSH IV SOLN 100 UNIT/ML 100 UNIT/ML
5 SOLUTION INTRAVENOUS EVERY 8 HOURS
Status: DISCONTINUED | OUTPATIENT
Start: 2020-02-25 | End: 2020-02-25 | Stop reason: HOSPADM

## 2020-02-25 RX ORDER — MEPERIDINE HYDROCHLORIDE 25 MG/ML
25 INJECTION INTRAMUSCULAR; INTRAVENOUS; SUBCUTANEOUS EVERY 30 MIN PRN
Status: CANCELLED | OUTPATIENT
Start: 2020-02-25

## 2020-02-25 RX ORDER — ACETAMINOPHEN 325 MG/1
650 TABLET ORAL
Status: CANCELLED | OUTPATIENT
Start: 2020-02-25

## 2020-02-25 RX ORDER — DIPHENHYDRAMINE HYDROCHLORIDE 50 MG/ML
50 INJECTION INTRAMUSCULAR; INTRAVENOUS
Status: CANCELLED
Start: 2020-02-25

## 2020-02-25 RX ADMIN — Medication 5 ML: at 10:52

## 2020-02-25 RX ADMIN — TRASTUZUMAB 450 MG: 150 INJECTION, POWDER, LYOPHILIZED, FOR SOLUTION INTRAVENOUS at 13:20

## 2020-02-25 RX ADMIN — Medication 5 ML: at 13:54

## 2020-02-25 ASSESSMENT — PAIN SCALES - GENERAL: PAINLEVEL: NO PAIN (0)

## 2020-02-25 ASSESSMENT — MIFFLIN-ST. JEOR: SCORE: 1294.65

## 2020-02-25 NOTE — PATIENT INSTRUCTIONS
[facility] Triage and after hours / weekends / holidays:  [phone]    Please call the triage or after hours line if you experience a temperature greater than or equal to 100.5, shaking chills, have uncontrolled nausea, vomiting and/or diarrhea, dizziness, shortness of breath, chest pain, bleeding, unexplained bruising, or if you have any other new/concerning symptoms, questions or concerns.      If you are having any concerning symptoms or wish to speak to a provider before your next infusion visit, please call your care coordinator or triage to notify them so we can adequately serve you.     If you need a refill on a narcotic prescription or other medication, please call before your infusion appointment.

## 2020-02-25 NOTE — PROGRESS NOTES
Infusion Nursing Note:  Hoda Brush presents today for Herceptin Day 1 cycle 42.    Patient seen by provider today: Yes: Dr. Aguiar   present during visit today: Yes, Language: Taiwanese.     Intravenous Access:  Implanted Port.    Treatment Conditions:  Lab Results   Component Value Date    HGB 12.0 02/25/2020     Lab Results   Component Value Date    WBC 6.2 02/25/2020      Lab Results   Component Value Date    ANEU 2.9 02/25/2020     Lab Results   Component Value Date     02/25/2020      Lab Results   Component Value Date     02/25/2020                   Lab Results   Component Value Date    POTASSIUM 3.8 02/25/2020           No results found for: MAG         Lab Results   Component Value Date    CR 0.69 02/25/2020                   Lab Results   Component Value Date    TAYLER 8.7 02/25/2020                Lab Results   Component Value Date    BILITOTAL 0.3 02/25/2020           Lab Results   Component Value Date    ALBUMIN 3.3 02/25/2020                    Lab Results   Component Value Date    ALT 44 02/25/2020           Lab Results   Component Value Date    AST 31 02/25/2020       Results reviewed, labs MET treatment parameters, ok to proceed with treatment.    Last ECHO was 12/17; EF 55-60%.      Post Infusion Assessment:  Patient tolerated infusion without incident.  Blood return noted pre and post infusion.  Access discontinued per protocol.       Discharge Plan:   Patient declined prescription refills.  AVS to patient via [employer].  Patient will return 3/17 for next Herceptin infusion.  Face to Face time: 0.    Nohemi Gunderson RN

## 2020-02-25 NOTE — LETTER
2/25/2020       RE: Hoda Brush  [address] [facility] [id number]     Dear Colleague,    Thank you for referring your patient, Hoda Brush, to the [facility]. Please see a copy of my visit note below.    Hoda is seen with a Marshallese . The patient is a refugee from [facility] and is here for continuation of care for her metastatic ER+HER2- breast cancer.  She is a Mormon refugee from [facility] and was seen in clinic with her daughter.  The only data we have from [facility] is an English translation of her records.       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  On 02/11/2014, she was seen by an oncologist.  The clinical staging of T4b N2 M1 was noted.   Her breast cancer was on the left side. There was no right breast cancer, confirmed by the patient and her daughter, correcting a possible error in the translated records.  ER was positive in 100% of the cells, KS at 14% and HER2 was 3+ positive.  It appears that this histopathologic information is on the mastectomy specimen. She underwent an MRI for staging of presumptive bone metastases which was performed 03/02/2014.  There were skeletal metastases in the thoracic vertebrae at 12, L1, L3, L5 and S1 vertebral body, ranging in size from 0.7-3.0 cm in size.  Ischial and right femur were also involved, as well as a large iliac mass measuring about 15 cm in the uterus. 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   Radiation Oncology consultation was performed 03/11/2014, and she was given radiation in 1 dose of 6 Gy to the right iliac lesion.        With the initial diagnosis, she initiated treatment with 6 cycles of CAF neoadjuvant chemotherapy 02/14, 07/07, 03/28, 04/18/14, 05/08 and 05/29/14. She also received monthly zoledronic acid.  She then underwent a modified left mastectomy of Palacios type and left lymphadenoectomy on  06/27/2014.   Pathologic examination showed number at [facility] was [id number] showed infiltrative grade 2 ductal cancer with 6 level 1 metastatic lympFollow up with Jossie for visits and trastuzumab on 2-5, 2-26, 3-19 with CBC, CMP.  Echocardiogram on 3-19.  Follow up with me 4-9 with CBC, CMP, CA27.29 and CEA and with CT CAP on 4-8.h nodes and 3 level 2 metastatic lymph nodes.  The differentiation was intermediate.  The tumor was ER positive 70% of the cells, MI positive in 40% of the cells and HER2 was 3+ by immunohistochemistry and the Ki-67 labeling index was 20%. Her staging after surgery was stage IV, pT4b N2 M1.  I don't see a biopsy of the skeletal metastases.  She then had continuation with chemotherapy with 4 cycles of Herceptin and taxane with monthly zoledronic acid.  Tamoxifen was initiated.  She then had two years of Herceptin and a decision was made not to continue further Herceptin.  She continued on zoledronic acid every 3 months. She was clinically stable. [place] then moved to the [place] new refugee from [facility].  She has now been changed to letrozole.  Denosumab has now been held for new diagnosis of osteonecrosis of the R maxilla.      TREATMENT HISTORY:  A. Initial diagnosis with metastatic breast cancer in [facility].  Neoadjuvant CAF x 6.   B. Left mastectomy. Left axillary node dissection.  C.  Radiation in 1 dose to R iliac region.  C. Herceptin for 2 years taxane for a prescribed course then stopped, monthly zoledronic acid.  She had 2 years of Herceptin with tamoxifen added after chemotherapy.  D.  Tamoxifen alone and zoledronic acid every 3 months.  E.  [place]  We restarted Herceptin every 3 weeks and continued tamoxifen. Bone targeted agent changed to denosumab every 9 weeks.        INTERVAL HISTORY:  Hoda returns to the clinic after having had an upper endoscopy and diagnosis with gastritis was made without H pylori.  She was on ranitidine with some improvement but then  had some stomach pains last Saturday.  We will stop the ranitidine and begin omeprazole.  She also has some low back pain which ranges between 0 today and 5/10.  It is positional.  It does not wake her up at night.  She does not want physical therapy.  She denies fatigue, depression or anxiety.  She is otherwise doing quite well.      REVIEW OF SYSTEMS:  A 10-point review of systems is otherwise negative.      She is taking calcium and vitamin D.  She is exercising by walking.  She is eating a healthful diet.      PHYSICAL EXAMINATION:   VITAL SIGNS:  Blood pressure 122/60, pulse 79, respirations 14, temperature 98.3, O2 sat 97% on room air, height 1.6 meters and weight 77.6 kg.     GENERAL:  Hoda appeared generally well.  She has no alopecia.   OROPHARYNX:  Without lesions.   LYMPH:  There is no palpable cervical, supraclavicular, subclavicular or axillary lymphadenopathy.   BREASTS:  Exam was not performed today.   LUNGS:  Clear to percussion and auscultation.   HEART:  Regular rate and rhythm, S1, S2.   ABDOMEN:  Soft and nontender without hepatosplenomegaly.   EXTREMITIES:  Without edema.   PSYCHIATRIC:  Mood and affect were normal.      LABORATORY DATA:  The CBC and CMP were within normal limits.  LFT elevations have resolved.  Surgical pathology:  Antral biopsy was negative for H pylori.  Stomach ulcer was negative for H pylori and reactive gastropathy was diagnosed.      The upper endoscopy showed normal esophagus, small hiatal hernia, nonspecific gastritis, H pylori negative.  She recommended cessation of ibuprofen and trial of H2 blocker.      IMAGING:  A CT scan of the chest, abdomen and pelvis 02/03/2020 compared with 11/18/2019.  No liver metastases noted.  Stable pulmonary nodules and bone metastases.  Stable fluid of cholecystectomy has decreased.      ASSESSMENT AND PLAN:     1.  Hoda Brush is a 65-year-old woman with a history of ER-positive, NV-positive, HER2 positive breast cancer.  She  is from [facility], formally from [facility], and came to live in the [place] with [place].  She is here for continuation of every 3-week Herceptin, which she has been receiving along with tamoxifen daily.  The tumor is ER positive, VT positive and HER2 positive.  She had metastatic disease at the time of presentation with bone-only metastases by report.  She underwent neoadjuvant CAF, had a left mastectomy, left axillary lymph node dissection, radiation to the right hip, which included the right iliac region where she had metastatic disease.  She was initially on tamoxifen but then was switched to letrozole.  She continues on this and every 3-week trastuzumab.  She has had no evidence of disease progression for the last 3 years.   2.  Epigastric pain is likely related to gastritis diagnosed by upper endoscopy.  Ranitidine improved symptoms, but she still has symptoms and will, therefore, switch from the H2 blocker to proton pump inhibitor, omeprazole.  Gallbladder was also removed recently for gangrenous cholecystitis.  She has recovered well.  She knows not to take ibuprofen.   3.  Mild transaminase elevations resolved.  Restaging showed no evidence of liver metastases.   4.  Continue the letrozole.  5.  Discussion of bone health and right maxillary osteonecrosis.  She will continue with calcium and vitamin D.  We stopped denosumab because of recent diagnosis of osteonecrosis of the right upper maxilla.   6.  Followup.  We will see Hoda in followup in 3 weeks to continue ongoing trastuzumab.  All of her questions were answered. Follow up With KARISSA 3-17, 4-7, 4-28 with CBC, CMP. Follow up with me 5-19 with CBC, CMP, CA27.29 and CEA. CT CAP on 5-18.     Thank you for allowing us to continue to participate in Hoda's care.      Lisandro Aguiar MD      [facility]     I spent 40 minutes with the patient more than 50% of which was in counseling and coordination of  care.

## 2020-02-25 NOTE — NURSING NOTE
"Oncology Rooming Note    February 25, 2020 11:59 AM   Hoda Brush is a 65 year old female who presents for:    Chief Complaint   Patient presents with     Port Draw     Labs drawn via port by RN in lab. VS taken.      Oncology Clinic Visit     [facility] RETURN- BREAST CA     Initial Vitals: /60   Pulse 79   Temp 98.3  F (36.8  C) (Oral)   Resp 14   Ht 1.6 m (5' 2.99\")   Wt 77.6 kg (171 lb)   SpO2 97%   BMI 30.30 kg/m   Estimated body mass index is 30.3 kg/m  as calculated from the following:    Height as of this encounter: 1.6 m (5' 2.99\").    Weight as of this encounter: 77.6 kg (171 lb). Body surface area is 1.86 meters squared.  No Pain (0) Comment: Data Unavailable   No LMP recorded. Patient is postmenopausal.  Allergies reviewed: Yes  Medications reviewed: Yes    Medications: Medication refills not needed today.  Pharmacy name entered into [employer]: [id number] [place], [place] - [address] AT  [address] [facility]    Clinical concerns: No new concerns. Dr. Aguiar was notified.      Vik Tenorio LPN            "

## 2020-02-25 NOTE — PROGRESS NOTES
Hoda is seen with a Welsh . The patient is a refugee from [facility] and is here for continuation of care for her metastatic ER+HER2- breast cancer.  She is a Quaker refugee from [facility] and was seen in clinic with her daughter.  The only data we have from [facility] is an English translation of her records.       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  On 02/11/2014, she was seen by an oncologist.  The clinical staging of T4b N2 M1 was noted.   Her breast cancer was on the left side. There was no right breast cancer, confirmed by the patient and her daughter, correcting a possible error in the translated records.  ER was positive in 100% of the cells, [place] at 14% and HER2 was 3+ positive.  It appears that this histopathologic information is on the mastectomy specimen. She underwent an MRI for staging of presumptive bone metastases which was performed 03/02/2014.  There were skeletal metastases in the thoracic vertebrae at 12, L1, L3, L5 and S1 vertebral body, ranging in size from 0.7-3.0 cm in size.  Ischial and right femur were also involved, as well as a large iliac mass measuring about 15 cm in the uterus. 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   Radiation Oncology consultation was performed 03/11/2014, and she was given radiation in 1 dose of 6 Gy to the right iliac lesion.        With the initial diagnosis, she initiated treatment with 6 cycles of CAF neoadjuvant chemotherapy 02/14, 07/07, 03/28, 04/18/14, 05/08 and 05/29/14. She also received monthly zoledronic acid.  She then underwent a modified left mastectomy of Palacios type and left lymphadenoectomy on 06/27/2014.   Pathologic examination showed number at [facility] was [id number] showed infiltrative grade 2 ductal cancer with 6 level 1 metastatic lympFollow up with Jossie for visits and trastuzumab on 2-5, 2-26, 3-19 with CBC, CMP.  Echocardiogram on  3-19.  Follow up with me 4-9 with CBC, CMP, CA27.29 and CEA and with CT CAP on 4-8.h nodes and 3 level 2 metastatic lymph nodes.  The differentiation was intermediate.  The tumor was ER positive 70% of the cells, AR positive in 40% of the cells and HER2 was 3+ by immunohistochemistry and the Ki-67 labeling index was 20%. Her staging after surgery was stage IV, pT4b N2 M1.  I don't see a biopsy of the skeletal metastases.  She then had continuation with chemotherapy with 4 cycles of Herceptin and taxane with monthly zoledronic acid.  Tamoxifen was initiated.  She then had two years of Herceptin and a decision was made not to continue further Herceptin.  She continued on zoledronic acid every 3 months. She was clinically stable. She then moved to the [place] new refugee from [facility].  She has now been changed to letrozole.  Denosumab has now been held for new diagnosis of osteonecrosis of the R maxilla.      TREATMENT HISTORY:  A. Initial diagnosis with metastatic breast cancer in [facility].  Neoadjuvant CAF x 6.   B. Left mastectomy. Left axillary node dissection.  C.  Radiation in 1 dose to R iliac region.  C. Herceptin for 2 years taxane for a prescribed course then stopped, monthly zoledronic acid.  She had 2 years of Herceptin with tamoxifen added after chemotherapy.  D.  Tamoxifen alone and zoledronic acid every 3 months.  E.  [place]  We restarted Herceptin every 3 weeks and continued tamoxifen. Bone targeted agent changed to denosumab every 9 weeks.        INTERVAL HISTORY:  Hoda returns to the clinic after having had an upper endoscopy and diagnosis with gastritis was made without H pylori.  She was on ranitidine with some improvement but then had some stomach pains last Saturday.  We will stop the ranitidine and begin omeprazole.  She also has some low back pain which ranges between 0 today and 5/10.  It is positional.  It does not wake her up at night.  She does not want physical therapy.  She  denies fatigue, depression or anxiety.  She is otherwise doing quite well.      REVIEW OF SYSTEMS:  A 10-point review of systems is otherwise negative.      She is taking calcium and vitamin D.  She is exercising by walking.  She is eating a healthful diet.      PHYSICAL EXAMINATION:   VITAL SIGNS:  Blood pressure 122/60, pulse 79, respirations 14, temperature 98.3, O2 sat 97% on room air, height 1.6 meters and weight 77.6 kg.     GENERAL:  Hoda appeared generally well.  She has no alopecia.   OROPHARYNX:  Without lesions.   LYMPH:  There is no palpable cervical, supraclavicular, subclavicular or axillary lymphadenopathy.   BREASTS:  Exam was not performed today.   LUNGS:  Clear to percussion and auscultation.   HEART:  Regular rate and rhythm, S1, S2.   ABDOMEN:  Soft and nontender without hepatosplenomegaly.   EXTREMITIES:  Without edema.   PSYCHIATRIC:  Mood and affect were normal.      LABORATORY DATA:  The CBC and CMP were within normal limits.  LFT elevations have resolved.  Surgical pathology:  Antral biopsy was negative for H pylori.  Stomach ulcer was negative for H pylori and reactive gastropathy was diagnosed.      The upper endoscopy showed normal esophagus, small hiatal hernia, nonspecific gastritis, H pylori negative.  She recommended cessation of ibuprofen and trial of H2 blocker.      IMAGING:  A CT scan of the chest, abdomen and pelvis 02/03/2020 compared with 11/18/2019.  No liver metastases noted.  Stable pulmonary nodules and bone metastases.  Stable fluid of cholecystectomy has decreased.      ASSESSMENT AND PLAN:     1.  Hoda Brush is a 65-year-old woman with a history of ER-positive, NE-positive, HER2 positive breast cancer.  She is from [facility], formally from [facility], and came to live in the [place] with [place].  She is here for continuation of every 3-week Herceptin, which she has been receiving along with tamoxifen daily.  The tumor is ER positive, NE positive and HER2  positive.  She had metastatic disease at the time of presentation with bone-only metastases by report.  She underwent neoadjuvant CAF, had a left mastectomy, left axillary lymph node dissection, radiation to the right hip, which included the right iliac region where she had metastatic disease.  She was initially on tamoxifen but then was switched to letrozole.  She continues on this and every 3-week trastuzumab.  She has had no evidence of disease progression for the last 3 years.   2.  Epigastric pain is likely related to gastritis diagnosed by upper endoscopy.  Ranitidine improved symptoms, but she still has symptoms and will, therefore, switch from the H2 blocker to proton pump inhibitor, omeprazole.  Gallbladder was also removed recently for gangrenous cholecystitis.  She has recovered well.  She knows not to take ibuprofen.   3.  Mild transaminase elevations resolved.  Restaging showed no evidence of liver metastases.   4.  Continue the letrozole.  5.  Discussion of bone health and right maxillary osteonecrosis.  She will continue with calcium and vitamin D.  We stopped denosumab because of recent diagnosis of osteonecrosis of the right upper maxilla.   6.  Followup.  We will see Hoda in followup in 3 weeks to continue ongoing trastuzumab.  All of her questions were answered. Follow up With KARISSA 3-17, 4-7, 4-28 with CBC, CMP. Follow up with me 5-19 with CBC, CMP, CA27.29 and CEA. CT CAP on 5-18.     Thank you for allowing us to continue to participate in Hoda's care.      Lisandro Aguiar MD      [facility]     I spent 40 minutes with the patient more than 50% of which was in counseling and coordination of care.

## 2020-02-25 NOTE — NURSING NOTE
Chief Complaint   Patient presents with     Port Draw     Labs drawn via port by RN in lab. VS taken.      Labs drawn via Port accessed using 20g flat needle. Line flushed and Heparin locked. Vital signs taken. Checked into next appointment.       Yaneth Walker RN

## 2020-03-10 ENCOUNTER — HEALTH MAINTENANCE LETTER (OUTPATIENT)
Age: 65
End: 2020-03-10

## 2020-03-15 NOTE — PROGRESS NOTES
March 17, 2020    Reason for visit: Follow-up metastatic breast cancer    Hoda is seen with a Montenegrin . The patient is a refugee from [facility] and is here for continuation of care for her metastatic ER+HER2- breast cancer.  She is a Zoroastrian[employer] refugee from [facility] and was seen in clinic with her daughter.  The only data we have from [facility] is an English translation of her records.       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  On 02/11/2014, she was seen by an oncologist.  The clinical staging of T4b N2 M1 was noted.   Her breast cancer was on the left side. There was no right breast cancer, confirmed by the patient and her daughter, correcting a possible error in the translated records.  ER was positive in 100% of the cells, WA at 14% and HER2 was 3+ positive.  It appears that this histopathologic information is on the mastectomy specimen. She underwent an MRI for staging of presumptive bone metastases which was performed 03/02/2014.  There were skeletal metastases in the thoracic vertebrae at 12, L1, L3, L5 and S1 vertebral body, ranging in size from 0.7-3.0 cm in size.  Ischial and right femur were also involved, as well as a large iliac mass measuring about 15 cm in the uterus. 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   Radiation Oncology consultation was performed 03/11/2014, and she was given radiation in 1 dose of 6 Gy to the right iliac lesion.        With the initial diagnosis, she initiated treatment with 6 cycles of CAF neoadjuvant chemotherapy 02/14, 07/07, 03/28, 04/18/14, 05/08 and 05/29/14. She also received monthly zoledronic acid.  She then underwent a modified left mastectomy of Palacios type and left lymphadenoectomy on 06/27/2014.   Pathologic examination showed number at [facility] was [id number] showed infiltrative grade 2 ductal cancer with 6 level 1 metastatic lympFollow up with Jossie amador  visits and trastuzumab on 2-5, 2-26, 3-19 with CBC, CMP.  Echocardiogram on 3-19.  Follow up with me 4-9 with CBC, CMP, CA27.29 and CEA and with CT CAP on 4-8.h nodes and 3 level 2 metastatic lymph nodes.  The differentiation was intermediate.  The tumor was ER positive 70% of the cells, NC positive in 40% of the cells and HER2 was 3+ by immunohistochemistry and the Ki-67 labeling index was 20%. Her staging after surgery was stage IV, pT4b N2 M1.  I don't see a biopsy of the skeletal metastases.  She then had continuation with chemotherapy with 4 cycles of Herceptin and taxane with monthly zoledronic acid.  Tamoxifen was initiated.  She then had two years of Herceptin and a decision was made not to continue further Herceptin.  She continued on zoledronic acid every 3 months. She was clinically stable. [place] then [place] to the [place] new [place] from [facility].  She has now been changed to letrozole.  Denosumab has now been held for new diagnosis of osteonecrosis of the R maxilla.      TREATMENT HISTORY:  A. Initial diagnosis with metastatic breast cancer in [facility].  Neoadjuvant CAF x 6.   B. Left mastectomy. Left axillary node dissection.  C.  Radiation in 1 dose to R iliac region.  C. Herceptin for 2 years taxane for a prescribed course then stopped, monthly zoledronic acid.  She had 2 years of Herceptin with tamoxifen added after chemotherapy.  D.  Tamoxifen alone and zoledronic acid every 3 months.  E.  [place]  We restarted Herceptin every 3 weeks and continued tamoxifen. Bone targeted agent changed to denosumab every 9 weeks.        INTERVAL HISTORY:  Hoda returns to the clinic with no concerns today. She is tolerating herceptin well with no complaints.      REVIEW OF SYSTEMS:  A 10-point review of systems is otherwise negative.      She is taking calcium and vitamin D.  She is exercising by walking.  She is eating a healthful diet.      PHYSICAL EXAMINATION:   /74 (BP Location: Right arm,  Patient Position: Sitting, Cuff Size: Adult Regular)   Pulse 81   Temp 97.5  F (36.4  C) (Oral)   Resp 16   Wt 75.4 kg (166 lb 3.2 oz)   SpO2 97%   BMI 29.45 kg/m    Vitals:    03/17/20 1222   Weight: 75.4 kg (166 lb 3.2 oz)     GENERAL:  Natasha appeared generally well.  She has no alopecia.   She declines a physical exam today for social distancing during coronavirus pandemic     LABORATORY DATA:   Results for NATASHA LOUIS (MRN [id number]) as of 3/17/2020 16:36   Ref. Range 3/17/2020 12:27   Sodium Latest Ref Range: 133 - 144 mmol/L 143   Potassium Latest Ref Range: 3.4 - 5.3 mmol/L 3.6   Chloride Latest Ref Range: 94 - 109 mmol/L 110 (H)   Carbon Dioxide Latest Ref Range: 20 - 32 mmol/L 28   Urea Nitrogen Latest Ref Range: 7 - 30 mg/dL 16   Creatinine Latest Ref Range: 0.52 - 1.04 mg/dL 0.79   GFR Estimate Latest Ref Range: >60 mL/min/1.73_m2 79   GFR Estimate If Black Latest Ref Range: >60 mL/min/1.73_m2 >90   Calcium Latest Ref Range: 8.5 - 10.1 mg/dL 9.2   Anion Gap Latest Ref Range: 3 - 14 mmol/L 6   Albumin Latest Ref Range: 3.4 - 5.0 g/dL 3.4   Protein Total Latest Ref Range: 6.8 - 8.8 g/dL 7.7   Bilirubin Total Latest Ref Range: 0.2 - 1.3 mg/dL 0.3   Alkaline Phosphatase Latest Ref Range: 40 - 150 U/L 56   ALT Latest Ref Range: 0 - 50 U/L 27   AST Latest Ref Range: 0 - 45 U/L 23   Glucose Latest Ref Range: 70 - 99 mg/dL 85   WBC Latest Ref Range: 4.0 - 11.0 10e9/L 7.2   Hemoglobin Latest Ref Range: 11.7 - 15.7 g/dL 12.6   Hematocrit Latest Ref Range: 35.0 - 47.0 % 39.9   Platelet Count Latest Ref Range: 150 - 450 10e9/L 204   RBC Count Latest Ref Range: 3.8 - 5.2 10e12/L 4.25   MCV Latest Ref Range: 78 - 100 fl 94   MCH Latest Ref Range: 26.5 - 33.0 pg 29.6   MCHC Latest Ref Range: 31.5 - 36.5 g/dL 31.6   RDW Latest Ref Range: 10.0 - 15.0 % 13.3   Diff Method Unknown Automated Method   % Neutrophils Latest Units: % 57.2   % Lymphocytes Latest Units: % 32.7   % Monocytes Latest Units: % 6.5    % Eosinophils Latest Units: % 2.9   % Basophils Latest Units: % 0.4   % Immature Granulocytes Latest Units: % 0.3   Nucleated RBCs Latest Ref Range: 0 /100 0   Absolute Neutrophil Latest Ref Range: 1.6 - 8.3 10e9/L 4.1   Absolute Lymphocytes Latest Ref Range: 0.8 - 5.3 10e9/L 2.4   Absolute Monocytes Latest Ref Range: 0.0 - 1.3 10e9/L 0.5   Absolute Eosinophils Latest Ref Range: 0.0 - 0.7 10e9/L 0.2   Absolute Basophils Latest Ref Range: 0.0 - 0.2 10e9/L 0.0   Abs Immature Granulocytes Latest Ref Range: 0 - 0.4 10e9/L 0.0   Absolute Nucleated RBC Unknown 0.0   CEA and CA 27.29 pending    ASSESSMENT AND PLAN:     1.  Hoda Brush is a 65-year-old woman with a history of ER-positive, WV-positive, HER2 positive breast cancer.  She is from [facility], formally from [facility], and came to live in the [place] with [place] daughter.  She is here for continuation of every 3-week Herceptin, which she has been receiving along with tamoxifen daily.  The tumor is ER positive, WV positive and HER2 positive.  She had metastatic disease at the time of presentation with bone-only metastases by report.  She underwent neoadjuvant CAF, had a left mastectomy, left axillary lymph node dissection, radiation to the right hip, which included the right iliac region where she had metastatic disease.  She was initially on tamoxifen but then was switched to letrozole.  She continues on this and every 3-week trastuzumab.  She has had no evidence of disease progression for the last 3 years. Last echo was 12/17/19. She is due but given her lack of sx and extra visits to the hospital are not advised right now, we will wait. Follow-up with Anne in 3 weeks.     2.  Discussion of bone health and right maxillary osteonecrosis.  She will continue with calcium and vitamin D.  Dr. Aguiar stopped denosumab because of recent diagnosis of osteonecrosis of the right upper maxilla.      Over 50% of 15 min visit was spent counseling and coordinating  boni Wilson PA-C

## 2020-03-17 ENCOUNTER — INFUSION THERAPY VISIT (OUTPATIENT)
Dept: ONCOLOGY | Facility: CLINIC | Age: 65
End: 2020-03-17
Attending: PHYSICIAN ASSISTANT
Payer: COMMERCIAL

## 2020-03-17 VITALS
BODY MASS INDEX: 29.45 KG/M2 | WEIGHT: 166.2 LBS | RESPIRATION RATE: 16 BRPM | SYSTOLIC BLOOD PRESSURE: 129 MMHG | TEMPERATURE: 97.5 F | OXYGEN SATURATION: 97 % | HEART RATE: 81 BPM | DIASTOLIC BLOOD PRESSURE: 74 MMHG

## 2020-03-17 DIAGNOSIS — C50.912 MALIGNANT NEOPLASM OF LEFT FEMALE BREAST, UNSPECIFIED ESTROGEN RECEPTOR STATUS, UNSPECIFIED SITE OF BREAST (H): Primary | ICD-10-CM

## 2020-03-17 DIAGNOSIS — C50.919 METASTATIC BREAST CANCER: Primary | ICD-10-CM

## 2020-03-17 LAB
ALBUMIN SERPL-MCNC: 3.4 G/DL (ref 3.4–5)
ALP SERPL-CCNC: 56 U/L (ref 40–150)
ALT SERPL W P-5'-P-CCNC: 27 U/L (ref 0–50)
ANION GAP SERPL CALCULATED.3IONS-SCNC: 6 MMOL/L (ref 3–14)
AST SERPL W P-5'-P-CCNC: 23 U/L (ref 0–45)
BASOPHILS # BLD AUTO: 0 10E9/L (ref 0–0.2)
BASOPHILS NFR BLD AUTO: 0.4 %
BILIRUB SERPL-MCNC: 0.3 MG/DL (ref 0.2–1.3)
BUN SERPL-MCNC: 16 MG/DL (ref 7–30)
CALCIUM SERPL-MCNC: 9.2 MG/DL (ref 8.5–10.1)
CANCER AG27-29 SERPL-ACNC: 10 U/ML (ref 0–39)
CEA SERPL-MCNC: <0.5 UG/L (ref 0–2.5)
CHLORIDE SERPL-SCNC: 110 MMOL/L (ref 94–109)
CO2 SERPL-SCNC: 28 MMOL/L (ref 20–32)
CREAT SERPL-MCNC: 0.79 MG/DL (ref 0.52–1.04)
DIFFERENTIAL METHOD BLD: NORMAL
EOSINOPHIL # BLD AUTO: 0.2 10E9/L (ref 0–0.7)
EOSINOPHIL NFR BLD AUTO: 2.9 %
ERYTHROCYTE [DISTWIDTH] IN BLOOD BY AUTOMATED COUNT: 13.3 % (ref 10–15)
GFR SERPL CREATININE-BSD FRML MDRD: 79 ML/MIN/{1.73_M2}
GLUCOSE SERPL-MCNC: 85 MG/DL (ref 70–99)
HCT VFR BLD AUTO: 39.9 % (ref 35–47)
HGB BLD-MCNC: 12.6 G/DL (ref 11.7–15.7)
IMM GRANULOCYTES # BLD: 0 10E9/L (ref 0–0.4)
IMM GRANULOCYTES NFR BLD: 0.3 %
LYMPHOCYTES # BLD AUTO: 2.4 10E9/L (ref 0.8–5.3)
LYMPHOCYTES NFR BLD AUTO: 32.7 %
MCH RBC QN AUTO: 29.6 PG (ref 26.5–33)
MCHC RBC AUTO-ENTMCNC: 31.6 G/DL (ref 31.5–36.5)
MCV RBC AUTO: 94 FL (ref 78–100)
MONOCYTES # BLD AUTO: 0.5 10E9/L (ref 0–1.3)
MONOCYTES NFR BLD AUTO: 6.5 %
NEUTROPHILS # BLD AUTO: 4.1 10E9/L (ref 1.6–8.3)
NEUTROPHILS NFR BLD AUTO: 57.2 %
NRBC # BLD AUTO: 0 10*3/UL
NRBC BLD AUTO-RTO: 0 /100
PLATELET # BLD AUTO: 204 10E9/L (ref 150–450)
POTASSIUM SERPL-SCNC: 3.6 MMOL/L (ref 3.4–5.3)
PROT SERPL-MCNC: 7.7 G/DL (ref 6.8–8.8)
RBC # BLD AUTO: 4.25 10E12/L (ref 3.8–5.2)
SODIUM SERPL-SCNC: 143 MMOL/L (ref 133–144)
WBC # BLD AUTO: 7.2 10E9/L (ref 4–11)

## 2020-03-17 PROCEDURE — 25000128 H RX IP 250 OP 636: Mod: ZF | Performed by: PHYSICIAN ASSISTANT

## 2020-03-17 PROCEDURE — 96413 CHEMO IV INFUSION 1 HR: CPT

## 2020-03-17 PROCEDURE — 85025 COMPLETE CBC W/AUTO DIFF WBC: CPT | Performed by: PHYSICIAN ASSISTANT

## 2020-03-17 PROCEDURE — 82378 CARCINOEMBRYONIC ANTIGEN: CPT | Performed by: PHYSICIAN ASSISTANT

## 2020-03-17 PROCEDURE — 80053 COMPREHEN METABOLIC PANEL: CPT | Performed by: PHYSICIAN ASSISTANT

## 2020-03-17 PROCEDURE — 25800030 ZZH RX IP 258 OP 636: Mod: ZF | Performed by: PHYSICIAN ASSISTANT

## 2020-03-17 PROCEDURE — G0463 HOSPITAL OUTPT CLINIC VISIT: HCPCS | Mod: ZF

## 2020-03-17 PROCEDURE — 86300 IMMUNOASSAY TUMOR CA 15-3: CPT | Performed by: PHYSICIAN ASSISTANT

## 2020-03-17 PROCEDURE — 99213 OFFICE O/P EST LOW 20 MIN: CPT | Mod: ZP | Performed by: PHYSICIAN ASSISTANT

## 2020-03-17 RX ORDER — ALBUTEROL SULFATE 0.83 MG/ML
2.5 SOLUTION RESPIRATORY (INHALATION)
Status: CANCELLED | OUTPATIENT
Start: 2020-03-19

## 2020-03-17 RX ORDER — DIPHENHYDRAMINE HCL 25 MG
50 CAPSULE ORAL ONCE
Status: CANCELLED
Start: 2020-03-19

## 2020-03-17 RX ORDER — HEPARIN SODIUM (PORCINE) LOCK FLUSH IV SOLN 100 UNIT/ML 100 UNIT/ML
5 SOLUTION INTRAVENOUS EVERY 8 HOURS
Status: DISCONTINUED | OUTPATIENT
Start: 2020-03-17 | End: 2020-03-17 | Stop reason: HOSPADM

## 2020-03-17 RX ORDER — ALBUTEROL SULFATE 90 UG/1
1-2 AEROSOL, METERED RESPIRATORY (INHALATION)
Status: CANCELLED
Start: 2020-03-19

## 2020-03-17 RX ORDER — HEPARIN SODIUM (PORCINE) LOCK FLUSH IV SOLN 100 UNIT/ML 100 UNIT/ML
5 SOLUTION INTRAVENOUS ONCE
Status: COMPLETED | OUTPATIENT
Start: 2020-03-17 | End: 2020-03-17

## 2020-03-17 RX ORDER — ACETAMINOPHEN 325 MG/1
650 TABLET ORAL
Status: CANCELLED | OUTPATIENT
Start: 2020-03-19

## 2020-03-17 RX ORDER — METHYLPREDNISOLONE SODIUM SUCCINATE 125 MG/2ML
125 INJECTION, POWDER, LYOPHILIZED, FOR SOLUTION INTRAMUSCULAR; INTRAVENOUS
Status: CANCELLED
Start: 2020-03-19

## 2020-03-17 RX ORDER — EPINEPHRINE 0.3 MG/.3ML
0.3 INJECTION SUBCUTANEOUS EVERY 5 MIN PRN
Status: CANCELLED | OUTPATIENT
Start: 2020-03-19

## 2020-03-17 RX ORDER — SODIUM CHLORIDE 9 MG/ML
1000 INJECTION, SOLUTION INTRAVENOUS CONTINUOUS PRN
Status: CANCELLED
Start: 2020-03-19

## 2020-03-17 RX ORDER — DIPHENHYDRAMINE HYDROCHLORIDE 50 MG/ML
50 INJECTION INTRAMUSCULAR; INTRAVENOUS
Status: CANCELLED
Start: 2020-03-19

## 2020-03-17 RX ORDER — HEPARIN SODIUM (PORCINE) LOCK FLUSH IV SOLN 100 UNIT/ML 100 UNIT/ML
5 SOLUTION INTRAVENOUS EVERY 8 HOURS
Status: CANCELLED | OUTPATIENT
Start: 2020-03-19

## 2020-03-17 RX ORDER — EPINEPHRINE 1 MG/ML
0.3 INJECTION, SOLUTION INTRAMUSCULAR; SUBCUTANEOUS EVERY 5 MIN PRN
Status: CANCELLED | OUTPATIENT
Start: 2020-03-19

## 2020-03-17 RX ORDER — LORAZEPAM 2 MG/ML
0.5 INJECTION INTRAMUSCULAR EVERY 4 HOURS PRN
Status: CANCELLED
Start: 2020-03-19

## 2020-03-17 RX ORDER — MEPERIDINE HYDROCHLORIDE 25 MG/ML
25 INJECTION INTRAMUSCULAR; INTRAVENOUS; SUBCUTANEOUS EVERY 30 MIN PRN
Status: CANCELLED | OUTPATIENT
Start: 2020-03-19

## 2020-03-17 RX ADMIN — TRASTUZUMAB 450 MG: 150 INJECTION, POWDER, LYOPHILIZED, FOR SOLUTION INTRAVENOUS at 14:00

## 2020-03-17 RX ADMIN — Medication 5 ML: at 12:19

## 2020-03-17 RX ADMIN — Medication 5 ML: at 14:33

## 2020-03-17 ASSESSMENT — PAIN SCALES - GENERAL: PAINLEVEL: NO PAIN (0)

## 2020-03-17 NOTE — NURSING NOTE
Chief Complaint   Patient presents with     Oncology Clinic Visit     Return Metastatic Breast Cancer     Port Draw     Labs drawn via port by RN in lab. VS taken. Patient checked in for next appt.     Port accessed with 20 gauge gripper needle by RN, labs collected, line flushed with saline and heparin.  Vitals taken. Pt checked in for appointment.    Roula Estevez RN

## 2020-03-17 NOTE — NURSING NOTE
"Oncology Rooming Note    March 17, 2020 12:42 PM   Hoda Brush is a 65 year old female who presents for:    Chief Complaint   Patient presents with     Oncology Clinic Visit     Return Metastatic Breast Cancer     Port Draw     Labs drawn via port by RN in lab. VS taken. Patient checked in for next appt.     Initial Vitals: /74 (BP Location: Right arm, Patient Position: Sitting, Cuff Size: Adult Regular)   Pulse 81   Temp 97.5  F (36.4  C) (Oral)   Resp 16   Wt 75.4 kg (166 lb 3.2 oz)   SpO2 97%   BMI 29.45 kg/m   Estimated body mass index is 29.45 kg/m  as calculated from the following:    Height as of 2/25/20: 1.6 m (5' 2.99\").    Weight as of this encounter: 75.4 kg (166 lb 3.2 oz). Body surface area is 1.83 meters squared.  No Pain (0) Comment: Data Unavailable   No LMP recorded. Patient is postmenopausal.  Allergies reviewed: Yes  Medications reviewed: Yes    Medications: Medication refills not needed today.  Pharmacy name entered into [employer]: [id number] [place], [place] - [address] [facility]    Clinical concerns: No new concerns.         Shani Roth CMA              "

## 2020-03-17 NOTE — PROGRESS NOTES
Infusion Nursing Note:  Hoda Brush presents today for Day 1 Cycle 43 Herceptin.    Patient seen by provider today: Yes: Anna CHA   present during visit today: Yes, Language: British.     Note: N/A.    Intravenous Access:  Implanted Port.    Treatment Conditions:  Lab Results   Component Value Date    HGB 12.6 03/17/2020     Lab Results   Component Value Date    WBC 7.2 03/17/2020      Lab Results   Component Value Date    ANEU 4.1 03/17/2020     Lab Results   Component Value Date     03/17/2020      Lab Results   Component Value Date     03/17/2020                   Lab Results   Component Value Date    POTASSIUM 3.6 03/17/2020           No results found for: MAG         Lab Results   Component Value Date    CR 0.79 03/17/2020                   Lab Results   Component Value Date    TAYLER 9.2 03/17/2020                Lab Results   Component Value Date    BILITOTAL 0.3 03/17/2020           Lab Results   Component Value Date    ALBUMIN 3.4 03/17/2020                    Lab Results   Component Value Date    ALT 27 03/17/2020           Lab Results   Component Value Date    AST 23 03/17/2020       Results reviewed, labs MET treatment parameters, ok to proceed with treatment.  ECHO/MUGA completed 12/17/2019  EF 55-60%.      Post Infusion Assessment:  Patient tolerated infusion without incident.  Blood return noted pre and post infusion.  Site patent and intact, free from redness, edema or discomfort.  No evidence of extravasations.  Access discontinued per protocol.       Discharge Plan:   Patient declined prescription refills.  Discharge instructions reviewed with: Patient.  Patient and/or family verbalized understanding of discharge instructions and all questions answered.  AVS to patient via [employer].  Patient will return 4/7 for next MD/infusion appointment.   Patient discharged in stable condition accompanied by: self.  Departure Mode: Ambulatory.    Rosa Littlejohn,  RN

## 2020-04-02 DIAGNOSIS — C50.912 MALIGNANT NEOPLASM OF LEFT FEMALE BREAST, UNSPECIFIED ESTROGEN RECEPTOR STATUS, UNSPECIFIED SITE OF BREAST (H): ICD-10-CM

## 2020-04-02 DIAGNOSIS — Z91.89 AT RISK FOR CARDIOMYOPATHY: Primary | ICD-10-CM

## 2020-04-06 RX ORDER — EPINEPHRINE 1 MG/ML
0.3 INJECTION, SOLUTION INTRAMUSCULAR; SUBCUTANEOUS EVERY 5 MIN PRN
Status: CANCELLED | OUTPATIENT
Start: 2020-04-09

## 2020-04-06 RX ORDER — ALBUTEROL SULFATE 90 UG/1
1-2 AEROSOL, METERED RESPIRATORY (INHALATION)
Status: CANCELLED
Start: 2020-04-09

## 2020-04-06 RX ORDER — SODIUM CHLORIDE 9 MG/ML
1000 INJECTION, SOLUTION INTRAVENOUS CONTINUOUS PRN
Status: CANCELLED
Start: 2020-04-09

## 2020-04-06 RX ORDER — DIPHENHYDRAMINE HYDROCHLORIDE 50 MG/ML
50 INJECTION INTRAMUSCULAR; INTRAVENOUS
Status: CANCELLED
Start: 2020-04-09

## 2020-04-06 RX ORDER — LORAZEPAM 2 MG/ML
0.5 INJECTION INTRAMUSCULAR EVERY 4 HOURS PRN
Status: CANCELLED
Start: 2020-04-09

## 2020-04-06 RX ORDER — EPINEPHRINE 0.3 MG/.3ML
0.3 INJECTION SUBCUTANEOUS EVERY 5 MIN PRN
Status: CANCELLED | OUTPATIENT
Start: 2020-04-09

## 2020-04-06 RX ORDER — ALBUTEROL SULFATE 0.83 MG/ML
2.5 SOLUTION RESPIRATORY (INHALATION)
Status: CANCELLED | OUTPATIENT
Start: 2020-04-09

## 2020-04-06 RX ORDER — HEPARIN SODIUM (PORCINE) LOCK FLUSH IV SOLN 100 UNIT/ML 100 UNIT/ML
5 SOLUTION INTRAVENOUS EVERY 8 HOURS
Status: CANCELLED | OUTPATIENT
Start: 2020-04-09

## 2020-04-06 RX ORDER — METHYLPREDNISOLONE SODIUM SUCCINATE 125 MG/2ML
125 INJECTION, POWDER, LYOPHILIZED, FOR SOLUTION INTRAMUSCULAR; INTRAVENOUS
Status: CANCELLED
Start: 2020-04-09

## 2020-04-06 RX ORDER — ACETAMINOPHEN 325 MG/1
650 TABLET ORAL
Status: CANCELLED | OUTPATIENT
Start: 2020-04-09

## 2020-04-06 RX ORDER — DIPHENHYDRAMINE HCL 25 MG
50 CAPSULE ORAL ONCE
Status: CANCELLED
Start: 2020-04-09

## 2020-04-06 RX ORDER — MEPERIDINE HYDROCHLORIDE 25 MG/ML
25 INJECTION INTRAMUSCULAR; INTRAVENOUS; SUBCUTANEOUS EVERY 30 MIN PRN
Status: CANCELLED | OUTPATIENT
Start: 2020-04-09

## 2020-04-07 ENCOUNTER — INFUSION THERAPY VISIT (OUTPATIENT)
Dept: ONCOLOGY | Facility: CLINIC | Age: 65
End: 2020-04-07
Attending: PHYSICIAN ASSISTANT
Payer: COMMERCIAL

## 2020-04-07 VITALS
BODY MASS INDEX: 30.25 KG/M2 | RESPIRATION RATE: 18 BRPM | WEIGHT: 170.7 LBS | OXYGEN SATURATION: 95 % | TEMPERATURE: 98.2 F | DIASTOLIC BLOOD PRESSURE: 66 MMHG | HEART RATE: 86 BPM | SYSTOLIC BLOOD PRESSURE: 104 MMHG

## 2020-04-07 DIAGNOSIS — C50.912 MALIGNANT NEOPLASM OF LEFT FEMALE BREAST, UNSPECIFIED ESTROGEN RECEPTOR STATUS, UNSPECIFIED SITE OF BREAST (H): Primary | ICD-10-CM

## 2020-04-07 PROCEDURE — 99207 ZZC NO BILLABLE SERVICE THIS VISIT: CPT | Mod: ZP

## 2020-04-07 PROCEDURE — 25000128 H RX IP 250 OP 636: Mod: ZF | Performed by: PHYSICIAN ASSISTANT

## 2020-04-07 PROCEDURE — 96413 CHEMO IV INFUSION 1 HR: CPT

## 2020-04-07 PROCEDURE — 25800030 ZZH RX IP 258 OP 636: Mod: ZF | Performed by: PHYSICIAN ASSISTANT

## 2020-04-07 RX ORDER — HEPARIN SODIUM (PORCINE) LOCK FLUSH IV SOLN 100 UNIT/ML 100 UNIT/ML
5 SOLUTION INTRAVENOUS EVERY 8 HOURS
Status: DISCONTINUED | OUTPATIENT
Start: 2020-04-07 | End: 2020-04-07 | Stop reason: HOSPADM

## 2020-04-07 RX ADMIN — Medication 5 ML: at 14:45

## 2020-04-07 RX ADMIN — TRASTUZUMAB 450 MG: 150 INJECTION, POWDER, LYOPHILIZED, FOR SOLUTION INTRAVENOUS at 14:14

## 2020-04-07 ASSESSMENT — PAIN SCALES - GENERAL: PAINLEVEL: NO PAIN (0)

## 2020-04-07 NOTE — PROGRESS NOTES
Infusion Nursing Note:  Hoda Brush presents today for C44 D1 Herceptin.    Patient seen by provider today: No   present during visit today: Patient refused  services; Kyrgyz    Note: N/A    Intravenous Access:  Implanted Port.    Treatment Conditions:  ECHO/MUGA completed 12/17/19  EF 55-60%. IB sent to scheduling to get next ECHO scheduled prior to 4/28 visit per TORB in treatment plan.  No labs needed today per TORB in treatment plan.      Post Infusion Assessment:  Patient tolerated infusion without incident.  Blood return noted pre and post infusion.  Site patent and intact, free from redness, edema or discomfort.  No evidence of extravasations.  Access discontinued per protocol.       Discharge Plan:   Patient declined prescription refills.  Discharge instructions reviewed with: Patient.  Patient and/or family verbalized understanding of discharge instructions and all questions answered.  AVS to patient via [employer].  Patient will return 4/28 for next appointment.   Patient discharged in stable condition accompanied by: self.  Departure Mode: Ambulatory.    Emily Cullen RN

## 2020-04-28 ENCOUNTER — APPOINTMENT (OUTPATIENT)
Dept: LAB | Facility: CLINIC | Age: 65
End: 2020-04-28
Attending: PHYSICIAN ASSISTANT
Payer: COMMERCIAL

## 2020-04-28 ENCOUNTER — VIRTUAL VISIT (OUTPATIENT)
Dept: ONCOLOGY | Facility: CLINIC | Age: 65
End: 2020-04-28
Attending: PHYSICIAN ASSISTANT
Payer: COMMERCIAL

## 2020-04-28 ENCOUNTER — ANCILLARY PROCEDURE (OUTPATIENT)
Dept: CARDIOLOGY | Facility: CLINIC | Age: 65
End: 2020-04-28
Attending: PHYSICIAN ASSISTANT
Payer: COMMERCIAL

## 2020-04-28 VITALS
HEART RATE: 83 BPM | BODY MASS INDEX: 30.97 KG/M2 | DIASTOLIC BLOOD PRESSURE: 77 MMHG | OXYGEN SATURATION: 94 % | RESPIRATION RATE: 16 BRPM | SYSTOLIC BLOOD PRESSURE: 109 MMHG | WEIGHT: 174.8 LBS | TEMPERATURE: 98.2 F

## 2020-04-28 DIAGNOSIS — C50.912 MALIGNANT NEOPLASM OF LEFT FEMALE BREAST, UNSPECIFIED ESTROGEN RECEPTOR STATUS, UNSPECIFIED SITE OF BREAST (H): ICD-10-CM

## 2020-04-28 DIAGNOSIS — C50.919 METASTATIC BREAST CANCER: Primary | ICD-10-CM

## 2020-04-28 DIAGNOSIS — C79.51 BONE METASTASIS: ICD-10-CM

## 2020-04-28 DIAGNOSIS — C50.912 MALIGNANT NEOPLASM OF LEFT FEMALE BREAST, UNSPECIFIED ESTROGEN RECEPTOR STATUS, UNSPECIFIED SITE OF BREAST (H): Primary | ICD-10-CM

## 2020-04-28 DIAGNOSIS — Z91.89 AT RISK FOR CARDIOMYOPATHY: ICD-10-CM

## 2020-04-28 LAB
ALBUMIN SERPL-MCNC: 3.2 G/DL (ref 3.4–5)
ALP SERPL-CCNC: 52 U/L (ref 40–150)
ALT SERPL W P-5'-P-CCNC: 53 U/L (ref 0–50)
ANION GAP SERPL CALCULATED.3IONS-SCNC: 4 MMOL/L (ref 3–14)
AST SERPL W P-5'-P-CCNC: 35 U/L (ref 0–45)
BASOPHILS # BLD AUTO: 0 10E9/L (ref 0–0.2)
BASOPHILS NFR BLD AUTO: 0.5 %
BILIRUB SERPL-MCNC: 0.3 MG/DL (ref 0.2–1.3)
BUN SERPL-MCNC: 13 MG/DL (ref 7–30)
CALCIUM SERPL-MCNC: 8.4 MG/DL (ref 8.5–10.1)
CANCER AG27-29 SERPL-ACNC: 9 U/ML (ref 0–39)
CEA SERPL-MCNC: <0.5 UG/L (ref 0–2.5)
CHLORIDE SERPL-SCNC: 109 MMOL/L (ref 94–109)
CO2 SERPL-SCNC: 30 MMOL/L (ref 20–32)
CREAT SERPL-MCNC: 0.68 MG/DL (ref 0.52–1.04)
DIFFERENTIAL METHOD BLD: ABNORMAL
EOSINOPHIL # BLD AUTO: 0.2 10E9/L (ref 0–0.7)
EOSINOPHIL NFR BLD AUTO: 4 %
ERYTHROCYTE [DISTWIDTH] IN BLOOD BY AUTOMATED COUNT: 13.5 % (ref 10–15)
GFR SERPL CREATININE-BSD FRML MDRD: >90 ML/MIN/{1.73_M2}
GLUCOSE SERPL-MCNC: 85 MG/DL (ref 70–99)
HCT VFR BLD AUTO: 36.6 % (ref 35–47)
HGB BLD-MCNC: 11.5 G/DL (ref 11.7–15.7)
IMM GRANULOCYTES # BLD: 0 10E9/L (ref 0–0.4)
IMM GRANULOCYTES NFR BLD: 0.2 %
LYMPHOCYTES # BLD AUTO: 2.3 10E9/L (ref 0.8–5.3)
LYMPHOCYTES NFR BLD AUTO: 38.5 %
MCH RBC QN AUTO: 29.7 PG (ref 26.5–33)
MCHC RBC AUTO-ENTMCNC: 31.4 G/DL (ref 31.5–36.5)
MCV RBC AUTO: 95 FL (ref 78–100)
MONOCYTES # BLD AUTO: 0.5 10E9/L (ref 0–1.3)
MONOCYTES NFR BLD AUTO: 9 %
NEUTROPHILS # BLD AUTO: 2.9 10E9/L (ref 1.6–8.3)
NEUTROPHILS NFR BLD AUTO: 47.8 %
NRBC # BLD AUTO: 0 10*3/UL
NRBC BLD AUTO-RTO: 0 /100
PLATELET # BLD AUTO: 201 10E9/L (ref 150–450)
POTASSIUM SERPL-SCNC: 3.9 MMOL/L (ref 3.4–5.3)
PROT SERPL-MCNC: 7.4 G/DL (ref 6.8–8.8)
RBC # BLD AUTO: 3.87 10E12/L (ref 3.8–5.2)
SODIUM SERPL-SCNC: 142 MMOL/L (ref 133–144)
WBC # BLD AUTO: 6 10E9/L (ref 4–11)

## 2020-04-28 PROCEDURE — 25000128 H RX IP 250 OP 636: Mod: ZF | Performed by: PHYSICIAN ASSISTANT

## 2020-04-28 PROCEDURE — 86300 IMMUNOASSAY TUMOR CA 15-3: CPT | Performed by: INTERNAL MEDICINE

## 2020-04-28 PROCEDURE — 99214 OFFICE O/P EST MOD 30 MIN: CPT | Mod: ZP | Performed by: PHYSICIAN ASSISTANT

## 2020-04-28 PROCEDURE — 80053 COMPREHEN METABOLIC PANEL: CPT | Performed by: INTERNAL MEDICINE

## 2020-04-28 PROCEDURE — 25800030 ZZH RX IP 258 OP 636: Mod: ZF | Performed by: PHYSICIAN ASSISTANT

## 2020-04-28 PROCEDURE — 85025 COMPLETE CBC W/AUTO DIFF WBC: CPT | Performed by: INTERNAL MEDICINE

## 2020-04-28 PROCEDURE — 82378 CARCINOEMBRYONIC ANTIGEN: CPT | Performed by: INTERNAL MEDICINE

## 2020-04-28 PROCEDURE — 96413 CHEMO IV INFUSION 1 HR: CPT

## 2020-04-28 RX ORDER — DIPHENHYDRAMINE HCL 25 MG
50 CAPSULE ORAL ONCE
Status: CANCELLED
Start: 2020-04-28

## 2020-04-28 RX ORDER — HEPARIN SODIUM (PORCINE) LOCK FLUSH IV SOLN 100 UNIT/ML 100 UNIT/ML
5 SOLUTION INTRAVENOUS EVERY 8 HOURS
Status: DISCONTINUED | OUTPATIENT
Start: 2020-04-28 | End: 2020-04-28 | Stop reason: HOSPADM

## 2020-04-28 RX ORDER — ALBUTEROL SULFATE 0.83 MG/ML
2.5 SOLUTION RESPIRATORY (INHALATION)
Status: CANCELLED | OUTPATIENT
Start: 2020-04-28

## 2020-04-28 RX ORDER — EPINEPHRINE 1 MG/ML
0.3 INJECTION, SOLUTION INTRAMUSCULAR; SUBCUTANEOUS EVERY 5 MIN PRN
Status: CANCELLED | OUTPATIENT
Start: 2020-04-28

## 2020-04-28 RX ORDER — LORAZEPAM 2 MG/ML
0.5 INJECTION INTRAMUSCULAR EVERY 4 HOURS PRN
Status: CANCELLED
Start: 2020-04-28

## 2020-04-28 RX ORDER — ACETAMINOPHEN 325 MG/1
650 TABLET ORAL
Status: CANCELLED | OUTPATIENT
Start: 2020-04-28

## 2020-04-28 RX ORDER — HEPARIN SODIUM (PORCINE) LOCK FLUSH IV SOLN 100 UNIT/ML 100 UNIT/ML
5 SOLUTION INTRAVENOUS EVERY 8 HOURS
Status: CANCELLED | OUTPATIENT
Start: 2020-04-28

## 2020-04-28 RX ORDER — METHYLPREDNISOLONE SODIUM SUCCINATE 125 MG/2ML
125 INJECTION, POWDER, LYOPHILIZED, FOR SOLUTION INTRAMUSCULAR; INTRAVENOUS
Status: CANCELLED
Start: 2020-04-28

## 2020-04-28 RX ORDER — ALBUTEROL SULFATE 90 UG/1
1-2 AEROSOL, METERED RESPIRATORY (INHALATION)
Status: CANCELLED
Start: 2020-04-28

## 2020-04-28 RX ORDER — DIPHENHYDRAMINE HYDROCHLORIDE 50 MG/ML
50 INJECTION INTRAMUSCULAR; INTRAVENOUS
Status: CANCELLED
Start: 2020-04-28

## 2020-04-28 RX ORDER — MEPERIDINE HYDROCHLORIDE 25 MG/ML
25 INJECTION INTRAMUSCULAR; INTRAVENOUS; SUBCUTANEOUS EVERY 30 MIN PRN
Status: CANCELLED | OUTPATIENT
Start: 2020-04-28

## 2020-04-28 RX ORDER — SODIUM CHLORIDE 9 MG/ML
1000 INJECTION, SOLUTION INTRAVENOUS CONTINUOUS PRN
Status: CANCELLED
Start: 2020-04-28

## 2020-04-28 RX ORDER — EPINEPHRINE 0.3 MG/.3ML
0.3 INJECTION SUBCUTANEOUS EVERY 5 MIN PRN
Status: CANCELLED | OUTPATIENT
Start: 2020-04-28

## 2020-04-28 RX ADMIN — Medication 5 ML: at 11:34

## 2020-04-28 RX ADMIN — Medication 5 ML: at 14:14

## 2020-04-28 RX ADMIN — TRASTUZUMAB 450 MG: 150 INJECTION, POWDER, LYOPHILIZED, FOR SOLUTION INTRAVENOUS at 13:39

## 2020-04-28 ASSESSMENT — PAIN SCALES - GENERAL: PAINLEVEL: NO PAIN (0)

## 2020-04-28 NOTE — PROGRESS NOTES
Infusion Nursing Note:  Hoda Brush presents today for Cycle 45 Day 1 Herceptin.    Patient seen by provider today: Yes: SEMAJ Crockett    Note: No concerns at this time.    Intravenous Access:  Implanted Port.    Treatment Conditions:  Lab Results   Component Value Date    HGB 11.5 04/28/2020     Lab Results   Component Value Date    WBC 6.0 04/28/2020      Lab Results   Component Value Date    ANEU 2.9 04/28/2020     Lab Results   Component Value Date     04/28/2020      Results reviewed, labs MET treatment parameters, ok to proceed with treatment.  ECHO/MUGA completed 4/28/20  EF 55-60%.      Post Infusion Assessment:  Patient tolerated infusion without incident.  Blood return noted pre and post infusion.  Access discontinued per protocol.       Discharge Plan:   Patient declined prescription refills.  Discharge instructions reviewed with: Patient.  Patient verbalized understanding of discharge instructions and all questions answered.  AVS to patient via [employer]. Patient will return 5/7/20 for RTC and 5/8/20 for next infusion appointment.   Patient discharged in stable condition accompanied by: self.  Face to Face time: 0.    CECILIA SANCHEZ RN

## 2020-04-28 NOTE — LETTER
4/28/2020       RE: Hoda Brush  [address] [address]     Dear Colleague,    Thank you for referring your patient, Hoda Brush, to the [facility]. Please see a copy of my visit note below.    History of Present Illness: Hoda Brush is a 65 year old female with ER positive, HER2 positive metastatic left breast cancer (bone).     TREATMENT HISTORY:  A. Initial diagnosis with metastatic breast cancer in [facility].  Neoadjuvant CAF x 6.   B. Left mastectomy. Left axillary node dissection.  C.  Radiation in 1 dose to R iliac region. g Gy.  C. Herceptin for 2 years taxane for a prescribed course then stopped, monthly zoledronic acid.  She had 2 years of Herceptin with tamoxifen added after chemotherapy.  D.  Tamoxifen alone and zoledronic acid every 3 months.  E.  [place]  We restarted Herceptin every 3 weeks and continued tamoxifen. Bone targeted agent changed to denosumab every 6 weeks.   F. Tamoxifen changed to letrozole 10/8/19.      She is from [facility], formerly from [facility], and came [place] the [place]  She lives with her daughter and is here for continuation of every 3 week Herceptin, which she receives along with tamoxifen.  Her tumor is ER positive, [place] positive, HER-2 positive.  She had metastatic disease at the time of presentation with bone-only metastases by report.  She presented with metastatic disease in 02/2014.  Staging was initially bone-only metastases by report.  She did her staging in 02/2014 that showed that she had stage IV disease, T4N2M1 invasive ductal carcinoma of the left breast.  She had a right hip metastasis.  She underwent neoadjuvant CAF, left mastectomy, left axillary lymph node dissection and radiation.  She had radiation of the right iliac region where she had metastatic disease.  Pathology report from [facility] shows the tumor to be positive for ER in 75% of the cells, positive for MN in 40% of the cells, and  the HER-2 was 3+ positive by immunohistochemistry.  The Ki-67 was 20%.  She had no evidence of nonbone metastatic disease on her PET/CT scan from 08/2017.     Had acute abdominal pain and N/V and found to have cholecystitis in the ER. Had cholecystectomy on 8/31. Herceptin resumed on 9/13/19. Tamoxifen changed to letrozole on 10/8/19. Last restaging on 2/3/20 was stable.     Interval History: Hoda is feeling well today. She has been staying at home and feeling well. She and her  go on a walk for 30-45 minutes twice per day in the early morning and later in the evening. She has good stamina with this and does not have SOB. No fevers/chills, cough, or sore throat. No CP or edema. She continues to tolerate treatment well without side effect. She has not had nausea or issues with bowels or bladder. She has intermittent low back pain which improves with position changes. She has not had to take anything for this. This is not new but she has noticed the pain since stopping xgeva. Pain does not radiate.       PHYSICAL EXAM:  Vital Signs 4/28/2020   Systolic 109   Diastolic 77   Pulse 83   Temperature 98.2   Respirations 16   Weight (LB) 174 lb 12.8 oz   Height    BMI (Calculated)    Pain    O2 94     General: Alert, oriented, pleasant, NAD  HEENT: Normocephalic, atraumatic, no icterus.   Lungs: CTA bilaterally, normal work of breathing  Cardiac: RRR, S1, S2, no murmurs  Abdomen: Soft, nontender, nondistended. Normoactive bowel sounds.   MSK: Tender to L4/L5. No paraspinal tenderness.   Neuro: CNII-XII grossly intact  Extremities: No pedal edema        Labs:    4/28/2020 11:37   Sodium 142   Potassium 3.9   Chloride 109   Carbon Dioxide 30   Urea Nitrogen 13   Creatinine 0.68   GFR Estimate >90   GFR Estimate If Black >90   Calcium 8.4 (L)   Anion Gap 4   Albumin 3.2 (L)   Protein Total 7.4   Bilirubin Total 0.3   Alkaline Phosphatase 52   ALT 53 (H)   AST 35   Glucose 85   WBC 6.0   Hemoglobin 11.5 (L)    Hematocrit 36.6   Platelet Count 201   RBC Count 3.87   MCV 95   MCH 29.7   MCHC 31.4 (L)   RDW 13.5   Diff Method Automated Method   % Neutrophils 47.8   % Lymphocytes 38.5   % Monocytes 9.0   % Eosinophils 4.0   % Basophils 0.5   % Immature Granulocytes 0.2   Nucleated RBCs 0   Absolute Neutrophil 2.9   Absolute Lymphocytes 2.3   Absolute Monocytes 0.5   Absolute Eosinophils 0.2   Absolute Basophils 0.0   Abs Immature Granulocytes 0.0   Absolute Nucleated RBC 0.0     ECHO 4/28   Interpretation Summary  Global and regional left ventricular function is normal with an EF of 55-60%.  Global right ventricular function is normal.  No significant valvular dysfunction.  Pulmonary artery systolic pressure is normal.  The inferior vena cava was normal in size with preserved respiratory  variability.  No pericardial effusion is present.    Assessment and Plan:      1. Metastatic breast cancer, ER, HI, HER2+ positive:   Was last treated in [facility] with Herceptin. We have continued Herceptin every 3 weeks as well as daily letrozole. CT CAP on 2/2020 with stable disease.   --Continue to tolerate treatment well.   --Echo today showed normal EF without change.   - Tumor markers pending today but have been stable. Has semi-acute low back pain which is intermittent. Has CT CAP in 3 weeks. Discussed using ice as needed.   -Follow-up in 3 weeks after CT with Dr. Aguiar.      2. Bone metastasis: Has previously been on xgeva every 6 weeks. On calcium + vitamin D.  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 Note from oral surgery was scanned in chart.      3. Low back pain: Intermittent with poor posture and improves with sitting upright and standing. She does have tenderness to palpation. Use ice as needed. Tylenol would be okay to use too. Will assess with CT in a couple of weeks. Call if worsens prior to then.     Anne Lea PA-C          Again, thank you for allowing me to participate in  the care of your patient.      Sincerely,    Anne Lea PA-C

## 2020-04-28 NOTE — NURSING NOTE
"Oncology Rooming Note    April 28, 2020 1:09 PM   Hoda Brush is a 65 year old female who presents for:    Chief Complaint   Patient presents with     Oncology Clinic Visit     Breast Ca     Initial Vitals: There were no vitals taken for this visit. Estimated body mass index is 30.97 kg/m  as calculated from the following:    Height as of 2/25/20: 1.6 m (5' 2.99\").    Weight as of an earlier encounter on 4/28/20: 79.3 kg (174 lb 12.8 oz). There is no height or weight on file to calculate BSA.  Data Unavailable Comment: Data Unavailable   No LMP recorded. Patient is postmenopausal.  Allergies reviewed: Yes  Medications reviewed: Yes    Medications: Medication refills not needed today.  Pharmacy name entered into [employer]: [facility] #[id number] - [place], [place] - [address] [facility]    Clinical concerns: none       Alejandra Ibarra MA            "

## 2020-04-28 NOTE — PROGRESS NOTES
History of Present Illness: Hoda Brush is a 65 year old female with ER positive, HER2 positive metastatic left breast cancer (bone).     TREATMENT HISTORY:  A. Initial diagnosis with metastatic breast cancer in [facility].  Neoadjuvant CAF x 6.   B. Left mastectomy. Left axillary node dissection.  C.  Radiation in 1 dose to R iliac region. g Gy.  C. Herceptin for 2 years taxane for a prescribed course then stopped, monthly zoledronic acid.  She had 2 years of Herceptin with tamoxifen added after chemotherapy.  D.  Tamoxifen alone and zoledronic acid every 3 months.  E.  [place]  We restarted Herceptin every 3 weeks and continued tamoxifen. Bone targeted agent changed to denosumab every 6 weeks.   F. Tamoxifen changed to letrozole 10/8/19.      She is from [facility], formerly from [facility], and came [place] the [place]  She lives with her daughter and is here for continuation of every 3 week Herceptin, which she receives along with tamoxifen.  Her tumor is ER positive, W[place] positive, HER-2 positive.  She had metastatic disease at the time of presentation with bone-only metastases by report.  She presented with metastatic disease in 02/2014.  Staging was initially bone-only metastases by report.  She did her staging in 02/2014 that showed that she had stage IV disease, T4N2M1 invasive ductal carcinoma of the left breast.  She had a right hip metastasis.  She underwent neoadjuvant CAF, left mastectomy, left axillary lymph node dissection and radiation.  She had radiation of the right iliac region where she had metastatic disease.  Pathology report from [facility] shows the tumor to be positive for ER in 75% of the cells, positive for WY in 40% of the cells, and the HER-2 was 3+ positive by immunohistochemistry.  The Ki-67 was 20%.  She had no evidence of nonbone metastatic disease on her PET/CT scan from 08/2017.     Had acute abdominal pain and N/V and found to have cholecystitis in the ER. Had  cholecystectomy on 8/31. Herceptin resumed on 9/13/19. Tamoxifen changed to letrozole on 10/8/19. Last restaging on 2/3/20 was stable.     Interval History: Hoda is feeling well today. She has been staying at home and feeling well. She and her  go on a walk for 30-45 minutes twice per day in the early morning and later in the evening. She has good stamina with this and does not have SOB. No fevers/chills, cough, or sore throat. No CP or edema. She continues to tolerate treatment well without side effect. She has not had nausea or issues with bowels or bladder. She has intermittent low back pain which improves with position changes. She has not had to take anything for this. This is not new but she has noticed the pain since stopping xgeva. Pain does not radiate.       PHYSICAL EXAM:  Vital Signs 4/28/2020   Systolic 109   Diastolic 77   Pulse 83   Temperature 98.2   Respirations 16   Weight (LB) 174 lb 12.8 oz   Height    BMI (Calculated)    Pain    O2 94     General: Alert, oriented, pleasant, NAD  HEENT: Normocephalic, atraumatic, no icterus.   Lungs: CTA bilaterally, normal work of breathing  Cardiac: RRR, S1, S2, no murmurs  Abdomen: Soft, nontender, nondistended. Normoactive bowel sounds.   MSK: Tender to L4/L5. No paraspinal tenderness.   Neuro: CNII-XII grossly intact  Extremities: No pedal edema        Labs:    4/28/2020 11:37   Sodium 142   Potassium 3.9   Chloride 109   Carbon Dioxide 30   Urea Nitrogen 13   Creatinine 0.68   GFR Estimate >90   GFR Estimate If Black >90   Calcium 8.4 (L)   Anion Gap 4   Albumin 3.2 (L)   Protein Total 7.4   Bilirubin Total 0.3   Alkaline Phosphatase 52   ALT 53 (H)   AST 35   Glucose 85   WBC 6.0   Hemoglobin 11.5 (L)   Hematocrit 36.6   Platelet Count 201   RBC Count 3.87   MCV 95   MCH 29.7   MCHC 31.4 (L)   RDW 13.5   Diff Method Automated Method   % Neutrophils 47.8   % Lymphocytes 38.5   % Monocytes 9.0   % Eosinophils 4.0   % Basophils 0.5   % Immature  Granulocytes 0.2   Nucleated RBCs 0   Absolute Neutrophil 2.9   Absolute Lymphocytes 2.3   Absolute Monocytes 0.5   Absolute Eosinophils 0.2   Absolute Basophils 0.0   Abs Immature Granulocytes 0.0   Absolute Nucleated RBC 0.0     ECHO 4/28   Interpretation Summary  Global and regional left ventricular function is normal with an EF of 55-60%.  Global right ventricular function is normal.  No significant valvular dysfunction.  Pulmonary artery systolic pressure is normal.  The inferior vena cava was normal in size with preserved respiratory  variability.  No pericardial effusion is present.    Assessment and Plan:      1. Metastatic breast cancer, ER, DC, HER2+ positive:   Was last treated in [facility] with Herceptin. We have continued Herceptin every 3 weeks as well as daily letrozole. CT CAP on 2/2020 with stable disease.   --Continue to tolerate treatment well.   --Echo today showed normal EF without change.   - Tumor markers pending today but have been stable. Has semi-acute low back pain which is intermittent. Has CT CAP in 3 weeks. Discussed using ice as needed.   -Follow-up in 3 weeks after CT with Dr. Aguiar.      2. Bone metastasis: Has previously been on xgeva every 6 weeks. On calcium + vitamin D.  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 Note from oral surgery was scanned in chart.      3. Low back pain: Intermittent with poor posture and improves with sitting upright and standing. She does have tenderness to palpation. Use ice as needed. Tylenol would be okay to use too. Will assess with CT in a couple of weeks. Call if worsens prior to then.     Anne Lea PA-C

## 2020-04-28 NOTE — PATIENT INSTRUCTIONS
[facility] Triage and after hours / weekends / holidays:  [phone]    Please call the triage or after hours line if you experience a temperature greater than or equal to 100.5, shaking chills, have uncontrolled nausea, vomiting and/or diarrhea, dizziness, shortness of breath, chest pain, bleeding, unexplained bruising, or if you have any other new/concerning symptoms, questions or concerns.      If you are having any concerning symptoms or wish to speak to a provider before your next infusion visit, please call your care coordinator or triage to notify them so we can adequately serve you.     If you need a refill on a narcotic prescription or other medication, please call before your infusion appointment.          Recent Results (from the past 24 hour(s))   Comprehensive metabolic panel    Collection Time: 04/28/20 11:37 AM   Result Value Ref Range    Sodium 142 133 - 144 mmol/L    Potassium 3.9 3.4 - 5.3 mmol/L    Chloride 109 94 - 109 mmol/L    Carbon Dioxide 30 20 - 32 mmol/L    Anion Gap 4 3 - 14 mmol/L    Glucose 85 70 - 99 mg/dL    Urea Nitrogen 13 7 - 30 mg/dL    Creatinine 0.68 0.52 - 1.04 mg/dL    GFR Estimate >90 >60 mL/min/[1.73_m2]    GFR Estimate If Black >90 >60 mL/min/[1.73_m2]    Calcium 8.4 (L) 8.5 - 10.1 mg/dL    Bilirubin Total 0.3 0.2 - 1.3 mg/dL    Albumin 3.2 (L) 3.4 - 5.0 g/dL    Protein Total 7.4 6.8 - 8.8 g/dL    Alkaline Phosphatase 52 40 - 150 U/L    ALT 53 (H) 0 - 50 U/L    AST 35 0 - 45 U/L   CBC with platelets differential    Collection Time: 04/28/20 11:37 AM   Result Value Ref Range    WBC 6.0 4.0 - 11.0 10e9/L    RBC Count 3.87 3.8 - 5.2 10e12/L    Hemoglobin 11.5 (L) 11.7 - 15.7 g/dL    Hematocrit 36.6 35.0 - 47.0 %    MCV 95 78 - 100 fl    MCH 29.7 26.5 - 33.0 pg    MCHC 31.4 (L) 31.5 - 36.5 g/dL    RDW 13.5 10.0 - 15.0 %    Platelet Count 201 150 - 450 10e9/L    Diff Method Automated Method     % Neutrophils 47.8 %    % Lymphocytes 38.5 %    % Monocytes 9.0 %    % Eosinophils 4.0  %    % Basophils 0.5 %    % Immature Granulocytes 0.2 %    Nucleated RBCs 0 0 /100    Absolute Neutrophil 2.9 1.6 - 8.3 10e9/L    Absolute Lymphocytes 2.3 0.8 - 5.3 10e9/L    Absolute Monocytes 0.5 0.0 - 1.3 10e9/L    Absolute Eosinophils 0.2 0.0 - 0.7 10e9/L    Absolute Basophils 0.0 0.0 - 0.2 10e9/L    Abs Immature Granulocytes 0.0 0 - 0.4 10e9/L    Absolute Nucleated RBC 0.0

## 2020-05-18 ENCOUNTER — ANCILLARY PROCEDURE (OUTPATIENT)
Dept: CT IMAGING | Facility: CLINIC | Age: 65
End: 2020-05-18
Attending: INTERNAL MEDICINE
Payer: COMMERCIAL

## 2020-05-18 DIAGNOSIS — C50.912 MALIGNANT NEOPLASM OF LEFT FEMALE BREAST, UNSPECIFIED ESTROGEN RECEPTOR STATUS, UNSPECIFIED SITE OF BREAST (H): Primary | ICD-10-CM

## 2020-05-18 DIAGNOSIS — C50.912 MALIGNANT NEOPLASM OF LEFT FEMALE BREAST, UNSPECIFIED ESTROGEN RECEPTOR STATUS, UNSPECIFIED SITE OF BREAST (H): ICD-10-CM

## 2020-05-18 LAB
ALBUMIN SERPL-MCNC: 3.2 G/DL (ref 3.4–5)
ALP SERPL-CCNC: 52 U/L (ref 40–150)
ALT SERPL W P-5'-P-CCNC: 37 U/L (ref 0–50)
ANION GAP SERPL CALCULATED.3IONS-SCNC: 6 MMOL/L (ref 3–14)
AST SERPL W P-5'-P-CCNC: 28 U/L (ref 0–45)
BASOPHILS # BLD AUTO: 0.1 10E9/L (ref 0–0.2)
BASOPHILS NFR BLD AUTO: 0.7 %
BILIRUB SERPL-MCNC: 0.2 MG/DL (ref 0.2–1.3)
BUN SERPL-MCNC: 12 MG/DL (ref 7–30)
CALCIUM SERPL-MCNC: 9.1 MG/DL (ref 8.5–10.1)
CANCER AG27-29 SERPL-ACNC: 10 U/ML (ref 0–39)
CEA SERPL-MCNC: <0.5 UG/L (ref 0–2.5)
CHLORIDE SERPL-SCNC: 110 MMOL/L (ref 94–109)
CO2 SERPL-SCNC: 27 MMOL/L (ref 20–32)
CREAT SERPL-MCNC: 0.72 MG/DL (ref 0.52–1.04)
DIFFERENTIAL METHOD BLD: NORMAL
EOSINOPHIL # BLD AUTO: 0.3 10E9/L (ref 0–0.7)
EOSINOPHIL NFR BLD AUTO: 3.7 %
ERYTHROCYTE [DISTWIDTH] IN BLOOD BY AUTOMATED COUNT: 13.2 % (ref 10–15)
GFR SERPL CREATININE-BSD FRML MDRD: 89 ML/MIN/{1.73_M2}
GLUCOSE SERPL-MCNC: 83 MG/DL (ref 70–99)
HCT VFR BLD AUTO: 38.7 % (ref 35–47)
HGB BLD-MCNC: 12.2 G/DL (ref 11.7–15.7)
IMM GRANULOCYTES # BLD: 0 10E9/L (ref 0–0.4)
IMM GRANULOCYTES NFR BLD: 0.3 %
LYMPHOCYTES # BLD AUTO: 2.6 10E9/L (ref 0.8–5.3)
LYMPHOCYTES NFR BLD AUTO: 38.2 %
MCH RBC QN AUTO: 29.8 PG (ref 26.5–33)
MCHC RBC AUTO-ENTMCNC: 31.5 G/DL (ref 31.5–36.5)
MCV RBC AUTO: 95 FL (ref 78–100)
MONOCYTES # BLD AUTO: 0.5 10E9/L (ref 0–1.3)
MONOCYTES NFR BLD AUTO: 7 %
NEUTROPHILS # BLD AUTO: 3.3 10E9/L (ref 1.6–8.3)
NEUTROPHILS NFR BLD AUTO: 50.1 %
NRBC # BLD AUTO: 0 10*3/UL
NRBC BLD AUTO-RTO: 0 /100
PLATELET # BLD AUTO: 228 10E9/L (ref 150–450)
POTASSIUM SERPL-SCNC: 4 MMOL/L (ref 3.4–5.3)
PROT SERPL-MCNC: 7.5 G/DL (ref 6.8–8.8)
RBC # BLD AUTO: 4.09 10E12/L (ref 3.8–5.2)
SODIUM SERPL-SCNC: 143 MMOL/L (ref 133–144)
WBC # BLD AUTO: 6.7 10E9/L (ref 4–11)

## 2020-05-18 PROCEDURE — 85025 COMPLETE CBC W/AUTO DIFF WBC: CPT | Performed by: INTERNAL MEDICINE

## 2020-05-18 PROCEDURE — 25000128 H RX IP 250 OP 636: Performed by: INTERNAL MEDICINE

## 2020-05-18 PROCEDURE — 82378 CARCINOEMBRYONIC ANTIGEN: CPT | Performed by: INTERNAL MEDICINE

## 2020-05-18 PROCEDURE — 86300 IMMUNOASSAY TUMOR CA 15-3: CPT | Performed by: INTERNAL MEDICINE

## 2020-05-18 PROCEDURE — 80053 COMPREHEN METABOLIC PANEL: CPT | Performed by: INTERNAL MEDICINE

## 2020-05-18 RX ORDER — HEPARIN SODIUM (PORCINE) LOCK FLUSH IV SOLN 100 UNIT/ML 100 UNIT/ML
5 SOLUTION INTRAVENOUS ONCE
Status: COMPLETED | OUTPATIENT
Start: 2020-05-18 | End: 2020-05-18

## 2020-05-18 RX ORDER — IOPAMIDOL 755 MG/ML
107 INJECTION, SOLUTION INTRAVASCULAR ONCE
Status: COMPLETED | OUTPATIENT
Start: 2020-05-18 | End: 2020-05-18

## 2020-05-18 RX ORDER — HEPARIN SODIUM (PORCINE) LOCK FLUSH IV SOLN 100 UNIT/ML 100 UNIT/ML
5 SOLUTION INTRAVENOUS EVERY 8 HOURS
Status: DISCONTINUED | OUTPATIENT
Start: 2020-05-18 | End: 2020-05-26 | Stop reason: HOSPADM

## 2020-05-18 RX ADMIN — Medication 5 ML: at 10:49

## 2020-05-18 RX ADMIN — HEPARIN SODIUM (PORCINE) LOCK FLUSH IV SOLN 100 UNIT/ML 5 ML: 100 SOLUTION at 11:44

## 2020-05-18 RX ADMIN — IOPAMIDOL 107 ML: 755 INJECTION, SOLUTION INTRAVASCULAR at 11:00

## 2020-05-18 NOTE — PROGRESS NOTES
"Hoda Brush is a 65 year old female who is being evaluated via a billable telephone visit.      The patient has been notified of following:     \"This telephone visit will be conducted via a call between you and your physician/provider. We have found that certain health care needs can be provided without the need for a physical exam.  This service lets us provide the care you need with a short phone conversation.  If a prescription is necessary we can send it directly to your pharmacy.  If lab work is needed we can place an order for that and you can then stop by our lab to have the test done at a later time.    Telephone visits are billed at different rates depending on your insurance coverage. During this emergency period, for some insurers they may be billed the same as an in-person visit.  Please reach out to your insurance provider with any questions.    If during the course of the call the physician/provider feels a telephone visit is not appropriate, you will not be charged for this service.\"    Patient has given verbal consent for Telephone visit?  Yes    What phone number would you like to be contacted at? [phone]    How would you like to obtain your AVS? [place]    Phone call duration: 9 minutes  11:40-11:49    Taina Rowe MD        Hoda is seen with a Vincentian . The patient is a refugee from [facility] and is here for continuation of care for her metastatic ER+HER2- breast cancer.  She is a [employer] refugee from [facility] and was seen in clinic with her daughter.  The only data we have from [facility] is an English translation of her records.       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  On 02/11/2014, she was seen by an oncologist.  The clinical staging of T4b N2 M1 was noted.   Her breast cancer was on the left side. There was no right breast cancer, confirmed by the patient " and her daughter, correcting a possible error in the translated records.  ER was positive in 100% of the cells, OR at 14% and HER2 was 3+ positive.  It appears that this histopathologic information is on the mastectomy specimen. She underwent an MRI for staging of presumptive bone metastases which was performed 03/02/2014.  There were skeletal metastases in the thoracic vertebrae at 12, L1, L3, L5 and S1 vertebral body, ranging in size from 0.7-3.0 cm in size.  Ischial and right femur were also involved, as well as a large iliac mass measuring about 15 cm in the uterus. 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   Radiation Oncology consultation was performed 03/11/2014, and she was given radiation in 1 dose of 6 Gy to the right iliac lesion.        With the initial diagnosis, she initiated treatment with 6 cycles of CAF neoadjuvant chemotherapy 02/14, 07/07, 03/28, 04/18/14, 05/08 and 05/29/14. She also received monthly zoledronic acid.  She then underwent a modified left mastectomy of Palacios type and left lymphadenoectomy on 06/27/2014.   Pathologic examination showed number at [facility] was [id number] showed infiltrative grade 2 ductal cancer with 6 level 1 metastatic lympFollow up with [facility] for visits and trastuzumab on 2-5, 2-26, 3-19 with CBC, CMP.  Echocardiogram on 3-19.  Follow up with me 4-9 with CBC, CMP, CA27.29 and CEA and with CT CAP on 4-8.h nodes and 3 level 2 metastatic lymph nodes.  The differentiation was intermediate.  The tumor was ER positive 70% of the cells, OR positive in 40% of the cells and HER2 was 3+ by immunohistochemistry and the Ki-67 labeling index was 20%. Her staging after surgery was stage IV, pT4b N2 M1.  I don't see a biopsy of the skeletal metastases.  She then had continuation with chemotherapy with 4 cycles of Herceptin and taxane with monthly zoledronic acid.  Tamoxifen was initiated.  She then had two years of Herceptin and a decision was made not to continue further Herceptin.  She  continued on zoledronic acid every 3 months. She was clinically stable. [place] the [place] new [place] from [facility].  She has now been changed to letrozole.  Denosumab has now been held for new diagnosis of osteonecrosis of the R maxilla.    History of cholecystectomy 2020.      TREATMENT HISTORY:  A. Initial diagnosis with metastatic breast cancer in [facility].  Neoadjuvant CAF x 6.   B. Left mastectomy. Left axillary node dissection.  C.  Radiation in 1 dose to R iliac region.  C. Herceptin for 2 years taxane for a prescribed course then stopped, monthly zoledronic acid.  She had 2 years of Herceptin with tamoxifen added after chemotherapy.  D.  Tamoxifen alone and zoledronic acid every 3 months.  E.  [place]  We restarted Herceptin every 3 weeks and continued tamoxifen. Bone targeted agent changed to denosumab every 9 weeks.    INTERVAL HISTORY  Hoda was interviewed via phone today. She feels well. No specific complaints. Waiting outside the infusion center. Denies fevers/chills/ Denies cough/SOB. Denies nausea/vomiting/diarrhea/abdominal pain. Mood and energy have been good. Denies heart palpitations or swelling of legs. Denies vaginal dryness or burning. Denies muscle aches or pains.     Labs  WBC 6.7, Hb 12.2, Plt 228  Electrolytes, renal function and LFTs wnl    Imaging  CT Chest abdomen pelvis 5/18/2020  IMPRESSION:   1. Stable diffuse bony metastatic disease without evidence of new or  enlarging lesions.  2. Stable to slightly decreased left lower lobe 6 mm pulmonary nodule.  Remainder of nodules are either stable or not appreciated on today's  examination. Continued attention on follow-up.  3. Decreased size of gallbladder fossa fluid collection now measuring  2.6 cm, previously measured up to 3.3 cm.       ASSESSMENT AND PLAN:     1.  Hoda Brush is a 65-year-old woman with a history of ER-positive, IA-positive, HER2 positive breast cancer.  She is from [facility], formally from  [place], and came [place] the [place].  She is here for continuation of every 3-week Herceptin, which she has been receiving along with tamoxifen daily.  The tumor is ER positive, NJ positive and HER2 positive.  She had metastatic disease at the time of presentation with bone-only metastases by report.  She underwent neoadjuvant CAF, had a left mastectomy, left axillary lymph node dissection, radiation to the right hip, which included the right iliac region where she had metastatic disease.  She was initially on tamoxifen but then was switched to letrozole.  She continues on this and every 3-week trastuzumab.  She has had no evidence of disease progression for the last 3 years.  We have continued Herceptin every 3 weeks as well as daily letrozole. CT CAP on 5/2020 with stable disease.   2.  Restaging. We reviewed imaging with her and she is pleased that there is no evidence of progression of her metastatic breast cancer. --Continue to tolerate treatment well. - Tumor markers  have been stable.    3.  Mild transaminase elevations resolved.  Restaging showed no evidence of liver metastases.   4.  Continue the letrozole.   5.  Echo shows stable EF. --Echo in 4/2020 showed normal EF without change.  6.  Discussion of bone health and right maxillary osteonecrosis.  She will continue with calcium and vitamin D.  We stopped denosumab because of recent diagnosis of osteonecrosis of the right upper  Maxilla.  This was biopsy proven. Note from oral surgery was scanned in chart.  Has previously been on xgeva every 6 weeks. On calcium + vitamin D.    7. Follow up.  Follow up with Herceptin 5/19, 6/6 no provider, 6/30 phone visit with KARISSA, 7/21 no provider, 8/11 phone visit with me. CT CAP on 8-10. CBC, CMP, CA27.29, CEA with visits. Echocardiogram 8-10.    Thank you for allowing us to participate in this patient's care.  The patient was seen and evaluated by me.  I discussed the patient with the fellow  and agree with the findings and plan in the note, which was edited by me.    Sincerely,     Lisandro Aguiar MD    [facility]  [phone].      Patient was interviewed and discussed with Dr. Aguiar    I spent 9 minutes with the patient more than 50% of which was in counseling and coordination of care.

## 2020-05-19 ENCOUNTER — VIRTUAL VISIT (OUTPATIENT)
Dept: ONCOLOGY | Facility: CLINIC | Age: 65
End: 2020-05-19
Attending: INTERNAL MEDICINE
Payer: COMMERCIAL

## 2020-05-19 VITALS — WEIGHT: 176.81 LBS | BODY MASS INDEX: 31.33 KG/M2

## 2020-05-19 DIAGNOSIS — C50.912 MALIGNANT NEOPLASM OF LEFT FEMALE BREAST, UNSPECIFIED ESTROGEN RECEPTOR STATUS, UNSPECIFIED SITE OF BREAST (H): Primary | ICD-10-CM

## 2020-05-19 DIAGNOSIS — Z51.11 ENCOUNTER FOR ANTINEOPLASTIC CHEMOTHERAPY: ICD-10-CM

## 2020-05-19 PROCEDURE — 25800030 ZZH RX IP 258 OP 636: Mod: ZF | Performed by: INTERNAL MEDICINE

## 2020-05-19 PROCEDURE — 96413 CHEMO IV INFUSION 1 HR: CPT

## 2020-05-19 PROCEDURE — 99213 OFFICE O/P EST LOW 20 MIN: CPT | Mod: 95 | Performed by: INTERNAL MEDICINE

## 2020-05-19 PROCEDURE — 25000128 H RX IP 250 OP 636: Mod: ZF | Performed by: INTERNAL MEDICINE

## 2020-05-19 RX ORDER — SODIUM CHLORIDE 9 MG/ML
1000 INJECTION, SOLUTION INTRAVENOUS CONTINUOUS PRN
Status: CANCELLED
Start: 2020-05-19

## 2020-05-19 RX ORDER — HEPARIN SODIUM (PORCINE) LOCK FLUSH IV SOLN 100 UNIT/ML 100 UNIT/ML
5 SOLUTION INTRAVENOUS EVERY 8 HOURS
Status: DISCONTINUED | OUTPATIENT
Start: 2020-05-19 | End: 2020-05-19 | Stop reason: HOSPADM

## 2020-05-19 RX ORDER — EPINEPHRINE 1 MG/ML
0.3 INJECTION, SOLUTION INTRAMUSCULAR; SUBCUTANEOUS EVERY 5 MIN PRN
Status: CANCELLED | OUTPATIENT
Start: 2020-08-11

## 2020-05-19 RX ORDER — ALBUTEROL SULFATE 0.83 MG/ML
2.5 SOLUTION RESPIRATORY (INHALATION)
Status: CANCELLED | OUTPATIENT
Start: 2020-08-11

## 2020-05-19 RX ORDER — SODIUM CHLORIDE 9 MG/ML
1000 INJECTION, SOLUTION INTRAVENOUS CONTINUOUS PRN
Status: CANCELLED
Start: 2020-06-29

## 2020-05-19 RX ORDER — ALBUTEROL SULFATE 90 UG/1
1-2 AEROSOL, METERED RESPIRATORY (INHALATION)
Status: CANCELLED
Start: 2020-06-29

## 2020-05-19 RX ORDER — DIPHENHYDRAMINE HCL 25 MG
50 CAPSULE ORAL ONCE
Status: CANCELLED
Start: 2020-08-11

## 2020-05-19 RX ORDER — LORAZEPAM 2 MG/ML
0.5 INJECTION INTRAMUSCULAR EVERY 4 HOURS PRN
Status: CANCELLED
Start: 2020-06-08

## 2020-05-19 RX ORDER — EPINEPHRINE 1 MG/ML
0.3 INJECTION, SOLUTION INTRAMUSCULAR; SUBCUTANEOUS EVERY 5 MIN PRN
Status: CANCELLED | OUTPATIENT
Start: 2020-06-08

## 2020-05-19 RX ORDER — EPINEPHRINE 0.3 MG/.3ML
0.3 INJECTION SUBCUTANEOUS EVERY 5 MIN PRN
Status: CANCELLED | OUTPATIENT
Start: 2020-08-11

## 2020-05-19 RX ORDER — HEPARIN SODIUM (PORCINE) LOCK FLUSH IV SOLN 100 UNIT/ML 100 UNIT/ML
5 SOLUTION INTRAVENOUS EVERY 8 HOURS
Status: CANCELLED | OUTPATIENT
Start: 2020-07-21

## 2020-05-19 RX ORDER — ACETAMINOPHEN 325 MG/1
650 TABLET ORAL
Status: CANCELLED | OUTPATIENT
Start: 2020-08-11

## 2020-05-19 RX ORDER — DIPHENHYDRAMINE HCL 25 MG
50 CAPSULE ORAL ONCE
Status: CANCELLED
Start: 2020-06-08

## 2020-05-19 RX ORDER — DIPHENHYDRAMINE HCL 25 MG
50 CAPSULE ORAL ONCE
Status: CANCELLED
Start: 2020-06-29

## 2020-05-19 RX ORDER — LORAZEPAM 2 MG/ML
0.5 INJECTION INTRAMUSCULAR EVERY 4 HOURS PRN
Status: CANCELLED
Start: 2020-08-11

## 2020-05-19 RX ORDER — ALBUTEROL SULFATE 0.83 MG/ML
2.5 SOLUTION RESPIRATORY (INHALATION)
Status: CANCELLED | OUTPATIENT
Start: 2020-05-19

## 2020-05-19 RX ORDER — EPINEPHRINE 0.3 MG/.3ML
0.3 INJECTION SUBCUTANEOUS EVERY 5 MIN PRN
Status: CANCELLED | OUTPATIENT
Start: 2020-05-19

## 2020-05-19 RX ORDER — MEPERIDINE HYDROCHLORIDE 25 MG/ML
25 INJECTION INTRAMUSCULAR; INTRAVENOUS; SUBCUTANEOUS EVERY 30 MIN PRN
Status: CANCELLED | OUTPATIENT
Start: 2020-08-11

## 2020-05-19 RX ORDER — EPINEPHRINE 0.3 MG/.3ML
0.3 INJECTION SUBCUTANEOUS EVERY 5 MIN PRN
Status: CANCELLED | OUTPATIENT
Start: 2020-07-21

## 2020-05-19 RX ORDER — LORAZEPAM 2 MG/ML
0.5 INJECTION INTRAMUSCULAR EVERY 4 HOURS PRN
Status: CANCELLED
Start: 2020-05-19

## 2020-05-19 RX ORDER — METHYLPREDNISOLONE SODIUM SUCCINATE 125 MG/2ML
125 INJECTION, POWDER, LYOPHILIZED, FOR SOLUTION INTRAMUSCULAR; INTRAVENOUS
Status: CANCELLED
Start: 2020-08-11

## 2020-05-19 RX ORDER — METHYLPREDNISOLONE SODIUM SUCCINATE 125 MG/2ML
125 INJECTION, POWDER, LYOPHILIZED, FOR SOLUTION INTRAMUSCULAR; INTRAVENOUS
Status: CANCELLED
Start: 2020-06-29

## 2020-05-19 RX ORDER — ALBUTEROL SULFATE 90 UG/1
1-2 AEROSOL, METERED RESPIRATORY (INHALATION)
Status: CANCELLED
Start: 2020-06-08

## 2020-05-19 RX ORDER — ACETAMINOPHEN 325 MG/1
650 TABLET ORAL
Status: CANCELLED | OUTPATIENT
Start: 2020-06-08

## 2020-05-19 RX ORDER — HEPARIN SODIUM (PORCINE) LOCK FLUSH IV SOLN 100 UNIT/ML 100 UNIT/ML
5 SOLUTION INTRAVENOUS EVERY 8 HOURS
Status: CANCELLED | OUTPATIENT
Start: 2020-05-19

## 2020-05-19 RX ORDER — DIPHENHYDRAMINE HYDROCHLORIDE 50 MG/ML
50 INJECTION INTRAMUSCULAR; INTRAVENOUS
Status: CANCELLED
Start: 2020-06-29

## 2020-05-19 RX ORDER — DIPHENHYDRAMINE HYDROCHLORIDE 50 MG/ML
50 INJECTION INTRAMUSCULAR; INTRAVENOUS
Status: CANCELLED
Start: 2020-05-19

## 2020-05-19 RX ORDER — ACETAMINOPHEN 325 MG/1
650 TABLET ORAL
Status: CANCELLED | OUTPATIENT
Start: 2020-07-21

## 2020-05-19 RX ORDER — SODIUM CHLORIDE 9 MG/ML
1000 INJECTION, SOLUTION INTRAVENOUS CONTINUOUS PRN
Status: CANCELLED
Start: 2020-07-21

## 2020-05-19 RX ORDER — ALBUTEROL SULFATE 0.83 MG/ML
2.5 SOLUTION RESPIRATORY (INHALATION)
Status: CANCELLED | OUTPATIENT
Start: 2020-06-29

## 2020-05-19 RX ORDER — ALBUTEROL SULFATE 90 UG/1
1-2 AEROSOL, METERED RESPIRATORY (INHALATION)
Status: CANCELLED
Start: 2020-05-19

## 2020-05-19 RX ORDER — SODIUM CHLORIDE 9 MG/ML
1000 INJECTION, SOLUTION INTRAVENOUS CONTINUOUS PRN
Status: CANCELLED
Start: 2020-06-08

## 2020-05-19 RX ORDER — EPINEPHRINE 0.3 MG/.3ML
0.3 INJECTION SUBCUTANEOUS EVERY 5 MIN PRN
Status: CANCELLED | OUTPATIENT
Start: 2020-06-08

## 2020-05-19 RX ORDER — MEPERIDINE HYDROCHLORIDE 25 MG/ML
25 INJECTION INTRAMUSCULAR; INTRAVENOUS; SUBCUTANEOUS EVERY 30 MIN PRN
Status: CANCELLED | OUTPATIENT
Start: 2020-07-21

## 2020-05-19 RX ORDER — EPINEPHRINE 1 MG/ML
0.3 INJECTION, SOLUTION INTRAMUSCULAR; SUBCUTANEOUS EVERY 5 MIN PRN
Status: CANCELLED | OUTPATIENT
Start: 2020-07-21

## 2020-05-19 RX ORDER — DIPHENHYDRAMINE HYDROCHLORIDE 50 MG/ML
50 INJECTION INTRAMUSCULAR; INTRAVENOUS
Status: CANCELLED
Start: 2020-06-08

## 2020-05-19 RX ORDER — EPINEPHRINE 0.3 MG/.3ML
0.3 INJECTION SUBCUTANEOUS EVERY 5 MIN PRN
Status: CANCELLED | OUTPATIENT
Start: 2020-06-29

## 2020-05-19 RX ORDER — MEPERIDINE HYDROCHLORIDE 25 MG/ML
25 INJECTION INTRAMUSCULAR; INTRAVENOUS; SUBCUTANEOUS EVERY 30 MIN PRN
Status: CANCELLED | OUTPATIENT
Start: 2020-06-08

## 2020-05-19 RX ORDER — ALBUTEROL SULFATE 0.83 MG/ML
2.5 SOLUTION RESPIRATORY (INHALATION)
Status: CANCELLED | OUTPATIENT
Start: 2020-07-21

## 2020-05-19 RX ORDER — ACETAMINOPHEN 325 MG/1
650 TABLET ORAL
Status: CANCELLED | OUTPATIENT
Start: 2020-06-29

## 2020-05-19 RX ORDER — EPINEPHRINE 1 MG/ML
0.3 INJECTION, SOLUTION INTRAMUSCULAR; SUBCUTANEOUS EVERY 5 MIN PRN
Status: CANCELLED | OUTPATIENT
Start: 2020-06-29

## 2020-05-19 RX ORDER — HEPARIN SODIUM (PORCINE) LOCK FLUSH IV SOLN 100 UNIT/ML 100 UNIT/ML
5 SOLUTION INTRAVENOUS EVERY 8 HOURS
Status: CANCELLED | OUTPATIENT
Start: 2020-06-08

## 2020-05-19 RX ORDER — METHYLPREDNISOLONE SODIUM SUCCINATE 125 MG/2ML
125 INJECTION, POWDER, LYOPHILIZED, FOR SOLUTION INTRAMUSCULAR; INTRAVENOUS
Status: CANCELLED
Start: 2020-07-21

## 2020-05-19 RX ORDER — DIPHENHYDRAMINE HCL 25 MG
50 CAPSULE ORAL ONCE
Status: CANCELLED
Start: 2020-07-21

## 2020-05-19 RX ORDER — MEPERIDINE HYDROCHLORIDE 25 MG/ML
25 INJECTION INTRAMUSCULAR; INTRAVENOUS; SUBCUTANEOUS EVERY 30 MIN PRN
Status: CANCELLED | OUTPATIENT
Start: 2020-05-19

## 2020-05-19 RX ORDER — HEPARIN SODIUM (PORCINE) LOCK FLUSH IV SOLN 100 UNIT/ML 100 UNIT/ML
5 SOLUTION INTRAVENOUS EVERY 8 HOURS
Status: CANCELLED | OUTPATIENT
Start: 2020-08-11

## 2020-05-19 RX ORDER — DIPHENHYDRAMINE HYDROCHLORIDE 50 MG/ML
50 INJECTION INTRAMUSCULAR; INTRAVENOUS
Status: CANCELLED
Start: 2020-08-11

## 2020-05-19 RX ORDER — METHYLPREDNISOLONE SODIUM SUCCINATE 125 MG/2ML
125 INJECTION, POWDER, LYOPHILIZED, FOR SOLUTION INTRAMUSCULAR; INTRAVENOUS
Status: CANCELLED
Start: 2020-05-19

## 2020-05-19 RX ORDER — SODIUM CHLORIDE 9 MG/ML
1000 INJECTION, SOLUTION INTRAVENOUS CONTINUOUS PRN
Status: CANCELLED
Start: 2020-08-11

## 2020-05-19 RX ORDER — HEPARIN SODIUM (PORCINE) LOCK FLUSH IV SOLN 100 UNIT/ML 100 UNIT/ML
5 SOLUTION INTRAVENOUS EVERY 8 HOURS
Status: CANCELLED | OUTPATIENT
Start: 2020-06-29

## 2020-05-19 RX ORDER — MEPERIDINE HYDROCHLORIDE 25 MG/ML
25 INJECTION INTRAMUSCULAR; INTRAVENOUS; SUBCUTANEOUS EVERY 30 MIN PRN
Status: CANCELLED | OUTPATIENT
Start: 2020-06-29

## 2020-05-19 RX ORDER — ACETAMINOPHEN 325 MG/1
650 TABLET ORAL
Status: CANCELLED | OUTPATIENT
Start: 2020-05-19

## 2020-05-19 RX ORDER — EPINEPHRINE 1 MG/ML
0.3 INJECTION, SOLUTION INTRAMUSCULAR; SUBCUTANEOUS EVERY 5 MIN PRN
Status: CANCELLED | OUTPATIENT
Start: 2020-05-19

## 2020-05-19 RX ORDER — ALBUTEROL SULFATE 90 UG/1
1-2 AEROSOL, METERED RESPIRATORY (INHALATION)
Status: CANCELLED
Start: 2020-08-11

## 2020-05-19 RX ORDER — ALBUTEROL SULFATE 90 UG/1
1-2 AEROSOL, METERED RESPIRATORY (INHALATION)
Status: CANCELLED
Start: 2020-07-21

## 2020-05-19 RX ORDER — DIPHENHYDRAMINE HCL 25 MG
50 CAPSULE ORAL ONCE
Status: CANCELLED
Start: 2020-05-19

## 2020-05-19 RX ORDER — ALBUTEROL SULFATE 0.83 MG/ML
2.5 SOLUTION RESPIRATORY (INHALATION)
Status: CANCELLED | OUTPATIENT
Start: 2020-06-08

## 2020-05-19 RX ORDER — METHYLPREDNISOLONE SODIUM SUCCINATE 125 MG/2ML
125 INJECTION, POWDER, LYOPHILIZED, FOR SOLUTION INTRAMUSCULAR; INTRAVENOUS
Status: CANCELLED
Start: 2020-06-08

## 2020-05-19 RX ORDER — LORAZEPAM 2 MG/ML
0.5 INJECTION INTRAMUSCULAR EVERY 4 HOURS PRN
Status: CANCELLED
Start: 2020-07-21

## 2020-05-19 RX ORDER — LORAZEPAM 2 MG/ML
0.5 INJECTION INTRAMUSCULAR EVERY 4 HOURS PRN
Status: CANCELLED
Start: 2020-06-29

## 2020-05-19 RX ORDER — DIPHENHYDRAMINE HYDROCHLORIDE 50 MG/ML
50 INJECTION INTRAMUSCULAR; INTRAVENOUS
Status: CANCELLED
Start: 2020-07-21

## 2020-05-19 RX ADMIN — TRASTUZUMAB 450 MG: 150 INJECTION, POWDER, LYOPHILIZED, FOR SOLUTION INTRAVENOUS at 13:22

## 2020-05-19 RX ADMIN — Medication 5 ML: at 13:56

## 2020-05-19 ASSESSMENT — PAIN SCALES - GENERAL: PAINLEVEL: NO PAIN (0)

## 2020-05-19 NOTE — PROGRESS NOTES
Infusion Nursing Note:  Hoda Brush presents today for Cycle 46 Day 1 Herceptin.    Patient seen by provider today: yes Dr. Aguiar today    Note: No concerns at this time.    Intravenous Access:  Implanted Port.    Treatment Conditions:  Lab Results   Component Value Date    WBC 6.7 05/18/2020     Lab Results   Component Value Date    RBC 4.09 05/18/2020     Lab Results   Component Value Date    HGB 12.2 05/18/2020     Lab Results   Component Value Date    HCT 38.7 05/18/2020     No components found for: MCT  Lab Results   Component Value Date    MCV 95 05/18/2020     Lab Results   Component Value Date    MCH 29.8 05/18/2020     Lab Results   Component Value Date    MCHC 31.5 05/18/2020     Lab Results   Component Value Date    RDW 13.2 05/18/2020     Lab Results   Component Value Date     05/18/2020     Last Comprehensive Metabolic Panel:  Sodium   Date Value Ref Range Status   05/18/2020 143 133 - 144 mmol/L Final     Potassium   Date Value Ref Range Status   05/18/2020 4.0 3.4 - 5.3 mmol/L Final     Chloride   Date Value Ref Range Status   05/18/2020 110 (H) 94 - 109 mmol/L Final     Carbon Dioxide   Date Value Ref Range Status   05/18/2020 27 20 - 32 mmol/L Final     Anion Gap   Date Value Ref Range Status   05/18/2020 6 3 - 14 mmol/L Final     Glucose   Date Value Ref Range Status   05/18/2020 83 70 - 99 mg/dL Final     Urea Nitrogen   Date Value Ref Range Status   05/18/2020 12 7 - 30 mg/dL Final     Creatinine   Date Value Ref Range Status   05/18/2020 0.72 0.52 - 1.04 mg/dL Final     GFR Estimate   Date Value Ref Range Status   05/18/2020 89 >60 mL/min/[1.73_m2] Final     Comment:     Non  GFR Calc  Starting 12/18/2018, serum creatinine based estimated GFR (eGFR) will be   calculated using the Chronic Kidney Disease Epidemiology Collaboration   (CKD-EPI) equation.       Calcium   Date Value Ref Range Status   05/18/2020 9.1 8.5 - 10.1 mg/dL Final     Bilirubin Total   Date  Value Ref Range Status   05/18/2020 0.2 0.2 - 1.3 mg/dL Final     Alkaline Phosphatase   Date Value Ref Range Status   05/18/2020 52 40 - 150 U/L Final     ALT   Date Value Ref Range Status   05/18/2020 37 0 - 50 U/L Final     AST   Date Value Ref Range Status   05/18/2020 28 0 - 45 U/L Final       Results reviewed, labs MET treatment parameters, ok to proceed with treatment.  ECHO/MUGA completed 4/28/20  EF 55-60%.      Post Infusion Assessment:  Patient tolerated infusion without incident.  Blood return noted pre and post infusion.  Access discontinued per protocol.       Discharge Plan:   Patient declined prescription refills.  Discharge instructions reviewed with: Patient.  Patient verbalized understanding of discharge instructions and all questions answered.  AVS to patient via [employer] [facility]. They will notify patient with upcoming appointments  Patient discharged in stable condition accompanied by: self.      Lory Rodriguez RN

## 2020-05-19 NOTE — LETTER
"    5/19/2020         RE: Hoda Brush  [address]  [place] [facility] [address]  [facility] [address]        Dear Colleague,    Thank you for referring your patient, Hoda Brush, to the [facility]. Please see a copy of my visit note below.    Hoda Brush is a 65 year old female who is being evaluated via a billable telephone visit.      The patient has been notified of following:     \"This telephone visit will be conducted via a call between you and your physician/provider. We have found that certain health care needs can be provided without the need for a physical exam.  This service lets us provide the care you need with a short phone conversation.  If a prescription is necessary we can send it directly to your pharmacy.  If lab work is needed we can place an order for that and you can then stop by our lab to have the test done at a later time.    Telephone visits are billed at different rates depending on your insurance coverage. During this emergency period, for some insurers they may be billed the same as an in-person visit.  Please reach out to your insurance provider with any questions.    If during the course of the call the physician/provider feels a telephone visit is not appropriate, you will not be charged for this service.\"    Patient has given verbal consent for Telephone visit?  Yes    What phone number would you like to be contacted at? [phone]    How would you like to obtain your AVS? [facility]    Phone call duration: 9 minutes  11:40-11:49    Taina Rowe MD        Hoda is seen with a Guatemalan . The patient is a refugee from [facility] and is here for continuation of care for her metastatic ER+HER2- breast cancer.  She is a Scientologist refugee from [facility] and was seen in clinic with her daughter.  The only data we have from [facility] is an English translation of her records.       Hoda was diagnosed in early 2014 with a left breast cancer with Paget " changes of the left breast and skeletal metastases at the time of diagnosis.  On 02/11/2014, she was seen by an oncologist.  The clinical staging of T4b N2 M1 was noted.   Her breast cancer was on the left side. There was no right breast cancer, confirmed by the patient and her daughter, correcting a possible error in the translated records.  ER was positive in 100% of the cells, RI at 14% and HER2 was 3+ positive.  It appears that this histopathologic information is on the mastectomy specimen. She underwent an MRI for staging of presumptive bone metastases which was performed 03/02/2014.  There were skeletal metastases in the thoracic vertebrae at 12, L1, L3, L5 and S1 vertebral body, ranging in size from 0.7-3.0 cm in size.  Ischial and right femur were also involved, as well as a large iliac mass measuring about 15 cm in the uterus. 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   Radiation Oncology consultation was performed 03/11/2014, and she was given radiation in 1 dose of 6 Gy to the right iliac lesion.        With the initial diagnosis, she initiated treatment with 6 cycles of CAF neoadjuvant chemotherapy 02/14, 07/07, 03/28, 04/18/14, 05/08 and 05/29/14. She also received monthly zoledronic acid.  She then underwent a modified left mastectomy of Palacios type and left lymphadenoectomy on 06/27/2014.   Pathologic examination showed number at [facility] was [id number] showed infiltrative grade 2 ductal cancer with 6 level 1 metastatic lympFollow up with [facility] for visits and trastuzumab on 2-5, 2-26, 3-19 with CBC, CMP.  Echocardiogram on 3-19.  Follow up with me 4-9 with CBC, CMP, CA27.29 and CEA and with CT CAP on 4-8.h nodes and 3 level 2 metastatic lymph nodes.  The differentiation was intermediate.  The tumor was ER positive 70% of the cells, RI positive in 40% of the cells and HER2 was 3+ by immunohistochemistry and the Ki-67 labeling index was 20%. Her staging after surgery was stage IV, pT4b N2 M1.  I don't see a biopsy of  the skeletal metastases.  She then had continuation with chemotherapy with 4 cycles of Herceptin and taxane with monthly zoledronic acid.  Tamoxifen was initiated.  She then had two years of Herceptin and a decision was made not to continue further Herceptin.  She continued on zoledronic acid every 3 months. She was clinically stable. [place] then [place] the [place] new [place] from [facility].  She has now been changed to letrozole.  Denosumab has now been held for new diagnosis of osteonecrosis of the R maxilla.    History of cholecystectomy 2020.      TREATMENT HISTORY:  A. Initial diagnosis with metastatic breast cancer in [facility].  Neoadjuvant CAF x 6.   B. Left mastectomy. Left axillary node dissection.  C.  Radiation in 1 dose to R iliac region.  C. Herceptin for 2 years taxane for a prescribed course then stopped, monthly zoledronic acid.  She had 2 years of Herceptin with tamoxifen added after chemotherapy.  D.  Tamoxifen alone and zoledronic acid every 3 months.  E.  [place]  We restarted Herceptin every 3 weeks and continued tamoxifen. Bone targeted agent changed to denosumab every 9 weeks.    INTERVAL HISTORY  Hoda was interviewed via phone today. She feels well. No specific complaints. Waiting outside the infusion center. Denies fevers/chills/ Denies cough/SOB. Denies nausea/vomiting/diarrhea/abdominal pain. Mood and energy have been good. Denies heart palpitations or swelling of legs. Denies vaginal dryness or burning. Denies muscle aches or pains.     Labs  WBC 6.7, Hb 12.2, Plt 228  Electrolytes, renal function and LFTs wnl    Imaging  CT Chest abdomen pelvis 5/18/2020  IMPRESSION:   1. Stable diffuse bony metastatic disease without evidence of new or  enlarging lesions.  2. Stable to slightly decreased left lower lobe 6 mm pulmonary nodule.  Remainder of nodules are either stable or not appreciated on today's  examination. Continued attention on follow-up.  3. Decreased size of gallbladder  fossa fluid collection now measuring  2.6 cm, previously measured up to 3.3 cm.       ASSESSMENT AND PLAN:     1.  Hoda Brush is a 65-year-old woman with a history of ER-positive, NH-positive, HER2 positive breast cancer.  She is from [facility], formally from [facility], and came to live in the [place] with [place] daughter.  She is here for continuation of every 3-week Herceptin, which she has been receiving along with tamoxifen daily.  The tumor is ER positive, NH positive and HER2 positive.  She had metastatic disease at the time of presentation with bone-only metastases by report.  She underwent neoadjuvant CAF, had a left mastectomy, left axillary lymph node dissection, radiation to the right hip, which included the right iliac region where she had metastatic disease.  She was initially on tamoxifen but then was switched to letrozole.  She continues on this and every 3-week trastuzumab.  She has had no evidence of disease progression for the last 3 years.  We have continued Herceptin every 3 weeks as well as daily letrozole. CT CAP on 5/2020 with stable disease.   2.  Restaging. We reviewed imaging with her and she is pleased that there is no evidence of progression of her metastatic breast cancer. --Continue to tolerate treatment well. - Tumor markers  have been stable.    3.  Mild transaminase elevations resolved.  Restaging showed no evidence of liver metastases.   4.  Continue the letrozole.   5.  Echo shows stable EF. --Echo in 4/2020 showed normal EF without change.  6.  Discussion of bone health and right maxillary osteonecrosis.  She will continue with calcium and vitamin D.  We stopped denosumab because of recent diagnosis of osteonecrosis of the right upper  Maxilla.  This was biopsy proven. Note from oral surgery was scanned in chart.  Has previously been on xgeva every 6 weeks. On calcium + vitamin D.    7. Follow up.  Follow up with Herceptin 5/19, 6/6 no provider, 6/30 phone visit with KARISSA,  7/21 no provider, 8/11 phone visit with me. CT CAP on 8-10. CBC, CMP, CA27.29, CEA with visits. Echocardiogram 8-10.    Thank you for allowing us to participate in this patient's care.  The patient was seen and evaluated by me.  I discussed the patient with the fellow and agree with the findings and plan in the note, which was edited by me.    Sincerely,     Lisandro Aguiar MD    [facility]  [phone].      Patient was interviewed and discussed with Dr. Aguiar    I spent 9 minutes with the patient more than 50% of which was in counseling and coordination of care.     Again, thank you for allowing me to participate in the care of your patient.        Sincerely,        Lisandro Aguiar MD

## 2020-06-08 ENCOUNTER — INFUSION THERAPY VISIT (OUTPATIENT)
Dept: ONCOLOGY | Facility: CLINIC | Age: 65
End: 2020-06-08
Attending: INTERNAL MEDICINE
Payer: COMMERCIAL

## 2020-06-08 ENCOUNTER — APPOINTMENT (OUTPATIENT)
Dept: LAB | Facility: CLINIC | Age: 65
End: 2020-06-08
Attending: INTERNAL MEDICINE
Payer: COMMERCIAL

## 2020-06-08 VITALS
TEMPERATURE: 97.9 F | RESPIRATION RATE: 14 BRPM | WEIGHT: 175.7 LBS | OXYGEN SATURATION: 94 % | BODY MASS INDEX: 31.13 KG/M2 | HEART RATE: 80 BPM | DIASTOLIC BLOOD PRESSURE: 69 MMHG | SYSTOLIC BLOOD PRESSURE: 120 MMHG

## 2020-06-08 DIAGNOSIS — C50.912 MALIGNANT NEOPLASM OF LEFT FEMALE BREAST, UNSPECIFIED ESTROGEN RECEPTOR STATUS, UNSPECIFIED SITE OF BREAST (H): Primary | ICD-10-CM

## 2020-06-08 LAB
ALBUMIN SERPL-MCNC: 3.3 G/DL (ref 3.4–5)
ALP SERPL-CCNC: 51 U/L (ref 40–150)
ALT SERPL W P-5'-P-CCNC: 42 U/L (ref 0–50)
ANION GAP SERPL CALCULATED.3IONS-SCNC: 5 MMOL/L (ref 3–14)
AST SERPL W P-5'-P-CCNC: 35 U/L (ref 0–45)
BASOPHILS # BLD AUTO: 0 10E9/L (ref 0–0.2)
BASOPHILS NFR BLD AUTO: 0.5 %
BILIRUB SERPL-MCNC: 0.4 MG/DL (ref 0.2–1.3)
BUN SERPL-MCNC: 14 MG/DL (ref 7–30)
CALCIUM SERPL-MCNC: 8.4 MG/DL (ref 8.5–10.1)
CANCER AG27-29 SERPL-ACNC: 14 U/ML (ref 0–39)
CEA SERPL-MCNC: <0.5 UG/L (ref 0–2.5)
CHLORIDE SERPL-SCNC: 108 MMOL/L (ref 94–109)
CO2 SERPL-SCNC: 29 MMOL/L (ref 20–32)
CREAT SERPL-MCNC: 0.85 MG/DL (ref 0.52–1.04)
DIFFERENTIAL METHOD BLD: NORMAL
EOSINOPHIL # BLD AUTO: 0.2 10E9/L (ref 0–0.7)
EOSINOPHIL NFR BLD AUTO: 3.1 %
ERYTHROCYTE [DISTWIDTH] IN BLOOD BY AUTOMATED COUNT: 13.3 % (ref 10–15)
GFR SERPL CREATININE-BSD FRML MDRD: 72 ML/MIN/{1.73_M2}
GLUCOSE SERPL-MCNC: 89 MG/DL (ref 70–99)
HCT VFR BLD AUTO: 37 % (ref 35–47)
HGB BLD-MCNC: 11.8 G/DL (ref 11.7–15.7)
IMM GRANULOCYTES # BLD: 0 10E9/L (ref 0–0.4)
IMM GRANULOCYTES NFR BLD: 0.2 %
LYMPHOCYTES # BLD AUTO: 2.2 10E9/L (ref 0.8–5.3)
LYMPHOCYTES NFR BLD AUTO: 36 %
MCH RBC QN AUTO: 30 PG (ref 26.5–33)
MCHC RBC AUTO-ENTMCNC: 31.9 G/DL (ref 31.5–36.5)
MCV RBC AUTO: 94 FL (ref 78–100)
MONOCYTES # BLD AUTO: 0.4 10E9/L (ref 0–1.3)
MONOCYTES NFR BLD AUTO: 7 %
NEUTROPHILS # BLD AUTO: 3.3 10E9/L (ref 1.6–8.3)
NEUTROPHILS NFR BLD AUTO: 53.2 %
NRBC # BLD AUTO: 0 10*3/UL
NRBC BLD AUTO-RTO: 0 /100
PLATELET # BLD AUTO: 210 10E9/L (ref 150–450)
POTASSIUM SERPL-SCNC: 3.7 MMOL/L (ref 3.4–5.3)
PROT SERPL-MCNC: 7.5 G/DL (ref 6.8–8.8)
RBC # BLD AUTO: 3.93 10E12/L (ref 3.8–5.2)
SODIUM SERPL-SCNC: 142 MMOL/L (ref 133–144)
WBC # BLD AUTO: 6.2 10E9/L (ref 4–11)

## 2020-06-08 PROCEDURE — 80053 COMPREHEN METABOLIC PANEL: CPT | Performed by: INTERNAL MEDICINE

## 2020-06-08 PROCEDURE — 25800030 ZZH RX IP 258 OP 636: Mod: ZF | Performed by: INTERNAL MEDICINE

## 2020-06-08 PROCEDURE — 82378 CARCINOEMBRYONIC ANTIGEN: CPT | Performed by: INTERNAL MEDICINE

## 2020-06-08 PROCEDURE — 96413 CHEMO IV INFUSION 1 HR: CPT

## 2020-06-08 PROCEDURE — 86300 IMMUNOASSAY TUMOR CA 15-3: CPT | Performed by: INTERNAL MEDICINE

## 2020-06-08 PROCEDURE — 25000128 H RX IP 250 OP 636: Mod: ZF | Performed by: INTERNAL MEDICINE

## 2020-06-08 PROCEDURE — 85025 COMPLETE CBC W/AUTO DIFF WBC: CPT | Performed by: INTERNAL MEDICINE

## 2020-06-08 RX ORDER — HEPARIN SODIUM (PORCINE) LOCK FLUSH IV SOLN 100 UNIT/ML 100 UNIT/ML
5 SOLUTION INTRAVENOUS EVERY 8 HOURS
Status: DISCONTINUED | OUTPATIENT
Start: 2020-06-08 | End: 2020-06-08 | Stop reason: HOSPADM

## 2020-06-08 RX ORDER — HEPARIN SODIUM (PORCINE) LOCK FLUSH IV SOLN 100 UNIT/ML 100 UNIT/ML
5 SOLUTION INTRAVENOUS DAILY PRN
Status: DISCONTINUED | OUTPATIENT
Start: 2020-06-08 | End: 2020-06-08 | Stop reason: HOSPADM

## 2020-06-08 RX ADMIN — TRASTUZUMAB 450 MG: 150 INJECTION, POWDER, LYOPHILIZED, FOR SOLUTION INTRAVENOUS at 08:31

## 2020-06-08 RX ADMIN — Medication 5 ML: at 09:02

## 2020-06-08 RX ADMIN — Medication 5 ML: at 07:35

## 2020-06-08 RX ADMIN — SODIUM CHLORIDE 250 ML: 9 INJECTION, SOLUTION INTRAVENOUS at 08:04

## 2020-06-08 ASSESSMENT — PAIN SCALES - GENERAL: PAINLEVEL: NO PAIN (0)

## 2020-06-08 NOTE — PROGRESS NOTES
Infusion Nursing Note:  Hoda Brush presents today for Cycle 47 Herceptin.    Patient seen by provider today: No   present during visit today: Yes, Language: Croatian.     Note: Pt arrives to infusion feeling well. No new complaints or concerns.     Intravenous Access:  Implanted Port.    Treatment Conditions:  Lab Results   Component Value Date    HGB 11.8 06/08/2020     Lab Results   Component Value Date    WBC 6.2 06/08/2020      Lab Results   Component Value Date    ANEU 3.3 06/08/2020     Lab Results   Component Value Date     06/08/2020      Results reviewed, labs MET treatment parameters, ok to proceed with treatment.  ECHO/MUGA completed 4/28/20  EF 55-60%.    Post Infusion Assessment:  Patient tolerated infusion without incident.  Blood return noted pre and post infusion.  Site patent and intact, free from redness, edema or discomfort.  No evidence of extravasations.  Access discontinued per protocol.     Discharge Plan:   Patient declined prescription refills.  AVS to patient via [employer].  Patient will return 6/30 for next appointment.   Patient discharged in stable condition accompanied by: self.  Departure Mode: Ambulatory.  Face to Face time: 0.    Beverly Hernandez RN

## 2020-06-08 NOTE — PATIENT INSTRUCTIONS
[facility] Triage and after hours / weekends / holidays:  [phone]    Please call the triage or after hours line if you experience a temperature greater than or equal to 100.5, shaking chills, have uncontrolled nausea, vomiting and/or diarrhea, dizziness, shortness of breath, chest pain, bleeding, unexplained bruising, or if you have any other new/concerning symptoms, questions or concerns.      If you are having any concerning symptoms or wish to speak to a provider before your next infusion visit, please call your care coordinator or triage to notify them so we can adequately serve you.     If you need a refill on a narcotic prescription or other medication, please call before your infusion appointment.                 June 2020 Sunday Monday Tuesday Wednesday Thursday Friday Saturday        1     2     3     4     5     6       7     8    [facility] LAB DRAW   7:30 AM   (60 min.)    [facility] LAB DRAW   [facility] Draw    [facility] ONC INFUSION 60   8:00 AM   (60 min.)    ONCOLOGY INFUSION   [facility] 9     10     11     12     13       14     15     16     17     18     19     20       21     22     23     24     25     26     27       28     29    [facility] DRAW   8:00 AM   (15 min.)   [facility]  [facility] DRAW   [facility]    TELEPHONE VISIT RETURN   7:30 AM   (50 min.)   Caryn Wheeler PA-C   [facility]   9:00 AM   (60 min.)    ONCOLOGY INFUSION   [facility]                                 July 2020 Sunday Monday Tuesday Wednesday Thursday Friday Saturday                  1     2     3     4       5     6     7     8     9     10     11       12     13     14     15     16     17     18       19     20     21    [facility] DRAW   7:30 AM   (15 min.)    [facility] DRAW   [facility]    P ONC INFUSION 60   8:00 AM   (60 min.)    ONCOLOGY INFUSION     [facility] 22     23     24     25       26     27     28     29     30     31                         Recent Results (from the past 24 hour(s))   CBC with platelets differential    Collection Time: 06/08/20  7:38 AM   Result Value Ref Range    WBC 6.2 4.0 - 11.0 10e9/L    RBC Count 3.93 3.8 - 5.2 10e12/L    Hemoglobin 11.8 11.7 - 15.7 g/dL    Hematocrit 37.0 35.0 - 47.0 %    MCV 94 78 - 100 fl    MCH 30.0 26.5 - 33.0 pg    MCHC 31.9 31.5 - 36.5 g/dL    RDW 13.3 10.0 - 15.0 %    Platelet Count 210 150 - 450 10e9/L    Diff Method Automated Method     % Neutrophils 53.2 %    % Lymphocytes 36.0 %    % Monocytes 7.0 %    % Eosinophils 3.1 %    % Basophils 0.5 %    % Immature Granulocytes 0.2 %    Nucleated RBCs 0 0 /100    Absolute Neutrophil 3.3 1.6 - 8.3 10e9/L    Absolute Lymphocytes 2.2 0.8 - 5.3 10e9/L    Absolute Monocytes 0.4 0.0 - 1.3 10e9/L    Absolute Eosinophils 0.2 0.0 - 0.7 10e9/L    Absolute Basophils 0.0 0.0 - 0.2 10e9/L    Abs Immature Granulocytes 0.0 0 - 0.4 10e9/L    Absolute Nucleated RBC 0.0

## 2020-06-29 VITALS
DIASTOLIC BLOOD PRESSURE: 66 MMHG | OXYGEN SATURATION: 95 % | HEART RATE: 80 BPM | WEIGHT: 177.9 LBS | RESPIRATION RATE: 16 BRPM | BODY MASS INDEX: 31.52 KG/M2 | SYSTOLIC BLOOD PRESSURE: 113 MMHG | TEMPERATURE: 97.6 F

## 2020-06-29 DIAGNOSIS — C50.912 MALIGNANT NEOPLASM OF LEFT FEMALE BREAST, UNSPECIFIED ESTROGEN RECEPTOR STATUS, UNSPECIFIED SITE OF BREAST (H): Primary | ICD-10-CM

## 2020-06-29 LAB
ALBUMIN SERPL-MCNC: 3.2 G/DL (ref 3.4–5)
ALP SERPL-CCNC: 71 U/L (ref 40–150)
ALT SERPL W P-5'-P-CCNC: 101 U/L (ref 0–50)
ANION GAP SERPL CALCULATED.3IONS-SCNC: 5 MMOL/L (ref 3–14)
AST SERPL W P-5'-P-CCNC: 77 U/L (ref 0–45)
BASOPHILS # BLD AUTO: 0 10E9/L (ref 0–0.2)
BASOPHILS NFR BLD AUTO: 0.6 %
BILIRUB SERPL-MCNC: 0.4 MG/DL (ref 0.2–1.3)
BUN SERPL-MCNC: 15 MG/DL (ref 7–30)
CALCIUM SERPL-MCNC: 8.5 MG/DL (ref 8.5–10.1)
CANCER AG27-29 SERPL-ACNC: 17 U/ML (ref 0–39)
CEA SERPL-MCNC: <0.5 UG/L (ref 0–2.5)
CHLORIDE SERPL-SCNC: 111 MMOL/L (ref 94–109)
CO2 SERPL-SCNC: 26 MMOL/L (ref 20–32)
CREAT SERPL-MCNC: 0.79 MG/DL (ref 0.52–1.04)
DIFFERENTIAL METHOD BLD: NORMAL
EOSINOPHIL # BLD AUTO: 0.2 10E9/L (ref 0–0.7)
EOSINOPHIL NFR BLD AUTO: 4 %
ERYTHROCYTE [DISTWIDTH] IN BLOOD BY AUTOMATED COUNT: 13.2 % (ref 10–15)
GFR SERPL CREATININE-BSD FRML MDRD: 79 ML/MIN/{1.73_M2}
GLUCOSE SERPL-MCNC: 85 MG/DL (ref 70–99)
HCT VFR BLD AUTO: 38.1 % (ref 35–47)
HGB BLD-MCNC: 12.2 G/DL (ref 11.7–15.7)
IMM GRANULOCYTES # BLD: 0 10E9/L (ref 0–0.4)
IMM GRANULOCYTES NFR BLD: 0.2 %
LYMPHOCYTES # BLD AUTO: 2.1 10E9/L (ref 0.8–5.3)
LYMPHOCYTES NFR BLD AUTO: 40.5 %
MCH RBC QN AUTO: 30.3 PG (ref 26.5–33)
MCHC RBC AUTO-ENTMCNC: 32 G/DL (ref 31.5–36.5)
MCV RBC AUTO: 95 FL (ref 78–100)
MONOCYTES # BLD AUTO: 0.3 10E9/L (ref 0–1.3)
MONOCYTES NFR BLD AUTO: 6.3 %
NEUTROPHILS # BLD AUTO: 2.6 10E9/L (ref 1.6–8.3)
NEUTROPHILS NFR BLD AUTO: 48.4 %
NRBC # BLD AUTO: 0 10*3/UL
NRBC BLD AUTO-RTO: 0 /100
PLATELET # BLD AUTO: 199 10E9/L (ref 150–450)
POTASSIUM SERPL-SCNC: 4.1 MMOL/L (ref 3.4–5.3)
PROT SERPL-MCNC: 7.3 G/DL (ref 6.8–8.8)
RBC # BLD AUTO: 4.02 10E12/L (ref 3.8–5.2)
SODIUM SERPL-SCNC: 142 MMOL/L (ref 133–144)
WBC # BLD AUTO: 5.3 10E9/L (ref 4–11)

## 2020-06-29 PROCEDURE — 36591 DRAW BLOOD OFF VENOUS DEVICE: CPT

## 2020-06-29 PROCEDURE — 80053 COMPREHEN METABOLIC PANEL: CPT | Performed by: PHYSICIAN ASSISTANT

## 2020-06-29 PROCEDURE — 82378 CARCINOEMBRYONIC ANTIGEN: CPT | Performed by: PHYSICIAN ASSISTANT

## 2020-06-29 PROCEDURE — 86300 IMMUNOASSAY TUMOR CA 15-3: CPT | Performed by: PHYSICIAN ASSISTANT

## 2020-06-29 PROCEDURE — 25000128 H RX IP 250 OP 636: Performed by: INTERNAL MEDICINE

## 2020-06-29 PROCEDURE — 85025 COMPLETE CBC W/AUTO DIFF WBC: CPT | Performed by: PHYSICIAN ASSISTANT

## 2020-06-29 RX ORDER — HEPARIN SODIUM (PORCINE) LOCK FLUSH IV SOLN 100 UNIT/ML 100 UNIT/ML
5 SOLUTION INTRAVENOUS
Status: COMPLETED | OUTPATIENT
Start: 2020-06-29 | End: 2020-06-29

## 2020-06-29 RX ADMIN — Medication 5 ML: at 08:17

## 2020-06-29 ASSESSMENT — PAIN SCALES - GENERAL: PAINLEVEL: NO PAIN (0)

## 2020-06-29 NOTE — NURSING NOTE
"Chief Complaint   Patient presents with     Port Draw     labs drawn from port by rn.  vs taken     Port accessed with 20 gauge 3/4\" gripper needle and labs drawn by rn.  Port flushed with NS and heparin then de-accessed.  Pt tolerated well.  VS taken.      Olga Hoff, RN      "

## 2020-06-30 ENCOUNTER — VIRTUAL VISIT (OUTPATIENT)
Dept: ONCOLOGY | Facility: CLINIC | Age: 65
End: 2020-06-30
Attending: PHYSICIAN ASSISTANT
Payer: COMMERCIAL

## 2020-06-30 ENCOUNTER — INFUSION THERAPY VISIT (OUTPATIENT)
Dept: ONCOLOGY | Facility: CLINIC | Age: 65
End: 2020-06-30
Attending: INTERNAL MEDICINE
Payer: COMMERCIAL

## 2020-06-30 VITALS
DIASTOLIC BLOOD PRESSURE: 79 MMHG | TEMPERATURE: 97.5 F | SYSTOLIC BLOOD PRESSURE: 116 MMHG | OXYGEN SATURATION: 95 % | RESPIRATION RATE: 16 BRPM | HEART RATE: 79 BPM

## 2020-06-30 DIAGNOSIS — C50.912 MALIGNANT NEOPLASM OF LEFT FEMALE BREAST, UNSPECIFIED ESTROGEN RECEPTOR STATUS, UNSPECIFIED SITE OF BREAST (H): Primary | ICD-10-CM

## 2020-06-30 PROCEDURE — 96413 CHEMO IV INFUSION 1 HR: CPT

## 2020-06-30 PROCEDURE — 25000128 H RX IP 250 OP 636: Mod: ZF | Performed by: INTERNAL MEDICINE

## 2020-06-30 PROCEDURE — 99212 OFFICE O/P EST SF 10 MIN: CPT | Mod: 95 | Performed by: PHYSICIAN ASSISTANT

## 2020-06-30 PROCEDURE — 25800030 ZZH RX IP 258 OP 636: Mod: ZF | Performed by: INTERNAL MEDICINE

## 2020-06-30 RX ORDER — HEPARIN SODIUM (PORCINE) LOCK FLUSH IV SOLN 100 UNIT/ML 100 UNIT/ML
5 SOLUTION INTRAVENOUS EVERY 8 HOURS
Status: DISCONTINUED | OUTPATIENT
Start: 2020-06-30 | End: 2020-06-30 | Stop reason: HOSPADM

## 2020-06-30 RX ADMIN — TRASTUZUMAB 450 MG: 150 INJECTION, POWDER, LYOPHILIZED, FOR SOLUTION INTRAVENOUS at 09:39

## 2020-06-30 RX ADMIN — SODIUM CHLORIDE 250 ML: 9 INJECTION, SOLUTION INTRAVENOUS at 09:39

## 2020-06-30 RX ADMIN — Medication 5 ML: at 10:12

## 2020-06-30 ASSESSMENT — PAIN SCALES - GENERAL: PAINLEVEL: NO PAIN (0)

## 2020-06-30 NOTE — PROGRESS NOTES
Infusion Nursing Note:  Hoda Brush presents today for Cycle 48, day 1 Herceptin.    Patient seen by provider today: Yes: SEMAJ Frost    Note: Patient presents to clinic today feeling well with no questions.  Pt did not request or require any intervention for pain today.    Intravenous Access:  Implanted Port.    Treatment Conditions:  Lab Results   Component Value Date    HGB 12.2 06/29/2020     Lab Results   Component Value Date    WBC 5.3 06/29/2020      Lab Results   Component Value Date    ANEU 2.6 06/29/2020     Lab Results   Component Value Date     06/29/2020      Lab Results   Component Value Date     06/29/2020                   Lab Results   Component Value Date    POTASSIUM 4.1 06/29/2020           No results found for: MAG         Lab Results   Component Value Date    CR 0.79 06/29/2020                   Lab Results   Component Value Date    TAYLER 8.5 06/29/2020                Lab Results   Component Value Date    BILITOTAL 0.4 06/29/2020           Lab Results   Component Value Date    ALBUMIN 3.2 06/29/2020                    Lab Results   Component Value Date     06/29/2020           Lab Results   Component Value Date    AST 77 06/29/2020     Results reviewed, labs MET treatment parameters, ok to proceed with treatment.  ECHO/MUGA completed 4/28/2020 EF 55-60%    Post Infusion Assessment:  Patient tolerated infusion without incident.  Blood return noted pre and post infusion.  Site patent and intact, free from redness, edema or discomfort.  No evidence of extravasations.  Access discontinued per protocol.    Discharge Plan:   Patient declined prescription refills.  Discharge instructions reviewed with: Patient.  Patient and/or family verbalized understanding of discharge instructions and all questions answered.  Copy of AVS reviewed with patient and/or family.  Patient will return 7/21/2020 for next appointment.  Patient discharged in stable condition accompanied by:  self.  Departure Mode: Ambulatory.    Steffi Oh RN

## 2020-06-30 NOTE — PATIENT INSTRUCTIONS
Contact Numbers    [facility]: [phone]    Call with chills and/or temperature greater than or equal to 100.5, uncontrolled nausea/vomiting, diarrhea, constipation, dizziness, shortness of breath, chest pain, bleeding, unexplained bruising, or any new/concerning symptoms, questions/concerns.     If you are having any concerning symptoms or wish to speak to a provider before your next infusion visit, please call your care coordinator or triage to notify them so we can adequately serve you.       After Hours: [phone]    If after hours, weekends, or holidays, call main hospital  and ask for Oncology doctor on call.       June 2020 Sunday Monday Tuesday Wednesday Thursday Friday Saturday        1     2     3     4     5     6       7     8    [facility] DRAW   7:30 AM   (60 min.)    [facility]   [facility]    P ONC INFUSION 60   8:00 AM   (60 min.)    ONCOLOGY INFUSION   [facility] 9     10     11     12     13       14     15     16     17     18     19     20       21     22     23     24     25     26     27       28     29    [facility] DRAW   8:00 AM   (15 min.)    [facility]   [facility]    TELEPHONE VISIT RETURN   7:30 AM   (50 min.)   Caryn Wheeler PA-C   [facility]    [facility] ONC INFUSION 60   9:00 AM   (90 min.)    ONCOLOGY INFUSION   [facility]                                 July 2020 Sunday Monday Tuesday Wednesday Thursday Friday Saturday                  1     2     3     4       5     6     7     8     9     10     11       12     13     14     15     16     17     18       19     20     21    [facility] DRAW   7:30 AM   (15 min.)    [facility] DRAW   [facility]    [facility] ONC INFUSION 60   8:00 AM   (60 min.)    ONCOLOGY INFUSION   [facility] 22     23     24     25       26     27     28     29      30     31                          Lab Results:  No results found for this or any previous visit (from the past 12 hour(s)).

## 2020-06-30 NOTE — LETTER
6/30/2020         RE: Hoda Brush  [address]  [place] [address]        Dear Colleague,    Thank you for referring your patient, Hoda Brush, to the [facility]. Please see a copy of my visit note below.    Hoda Brush is a 65 year old female who is being evaluated via a billable telephone visit.        I have reviewed and updated the patient's allergies and medication list.    Concerns: No concerns  Refills: No refills needed.        Sarai Montes CMA              History of Present Illness: Hoda Brush is a 65 year old female with ER positive, HER2 positive metastatic left breast cancer (bone).     TREATMENT HISTORY:  A. Initial diagnosis with metastatic breast cancer in [facility].  Neoadjuvant CAF x 6.   B. Left mastectomy. Left axillary node dissection.  C.  Radiation in 1 dose to R iliac region. g Gy.  C. Herceptin for 2 years taxane for a prescribed course then stopped, monthly zoledronic acid.  She had 2 years of Herceptin with tamoxifen added after chemotherapy.  D.  Tamoxifen alone and zoledronic acid every 3 months.  E.  [place]  We restarted Herceptin every 3 weeks and continued tamoxifen. Bone targeted agent changed to denosumab every 6 weeks.   F. Tamoxifen changed to letrozole 10/8/19.      She is from [facility], formerly from [facility], and came [place] the [place]  She lives with her daughter and is here for continuation of every 3 week Herceptin, which she receives along with tamoxifen.  Her tumor is ER positive, [place] positive, HER-2 positive.  She had metastatic disease at the time of presentation with bone-only metastases by report.  She presented with metastatic disease in 02/2014.  Staging was initially bone-only metastases by report.  She did her staging in 02/2014 that showed that she had stage IV disease, T4N2M1 invasive ductal carcinoma of the left breast.  She had a right hip metastasis.  She underwent  neoadjuvant CAF, left mastectomy, left axillary lymph node dissection and radiation.  She had radiation of the right iliac region where she had metastatic disease.  Pathology report from [facility] shows the tumor to be positive for ER in 75% of the cells, positive for [place] in 40% of the cells, and the HER-2 was 3+ positive by immunohistochemistry.  The Ki-67 was 20%.  She had no evidence of nonbone metastatic disease on her PET/CT scan from 08/2017.     Had acute abdominal pain and N/V and found to have cholecystitis in the ER. Had cholecystectomy on 8/31. Herceptin resumed on 9/13/19. Tamoxifen changed to letrozole on 10/8/19. Last restaging on 5/18/20 was stable.     Interval History:   On the phone with [place] .     -She is feeling well. Has no compliants  -Eating and drinking well  -No respiratory or GI symptoms  -No bleeding, edema, rash or neuropathy  -No f/c   -Occasional stomach pain and she takes omeprazole which helps     PHYSICAL EXAM:  Vital Signs 6/29/2020   Systolic 113   Diastolic 66   Pulse 80   Temperature 97.6   Respirations 16   Weight (LB) 177 lb 14.4 oz   O2 95       Objective:  General: patient sounds in no audible acute distress, alert and oriented, speech clear and fluid  Resp: Speaking in full sentences, no audible respiratory distress, no cough, no audible wheeze  Psych: able to articulate logical thoughts, able to abstract reason, no tangential thoughts, no hallucinations or delusions  His affect is normal    Labs:      6/29/2020 08:24   Sodium 142   Potassium 4.1   Chloride 111 (H)   Carbon Dioxide 26   Urea Nitrogen 15   Creatinine 0.79   GFR Estimate 79   GFR Estimate If Black >90   Calcium 8.5   Anion Gap 5   Albumin 3.2 (L)   Protein Total 7.3   Bilirubin Total 0.4   Alkaline Phosphatase 71    (H)   AST 77 (H)   CA 27-29 17   Glucose 85   WBC 5.3   Hemoglobin 12.2   Hematocrit 38.1   Platelet Count 199   RBC Count 4.02   MCV 95   MCH 30.3   MCHC 32.0   RDW 13.2   Diff  Method Automated Method   % Neutrophils 48.4   % Lymphocytes 40.5   % Monocytes 6.3   % Eosinophils 4.0   % Basophils 0.6   % Immature Granulocytes 0.2   Nucleated RBCs 0   Absolute Neutrophil 2.6   Absolute Lymphocytes 2.1   Absolute Monocytes 0.3   Absolute Eosinophils 0.2   Absolute Basophils 0.0   Abs Immature Granulocytes 0.0   Absolute Nucleated RBC 0.0   CEA <0.5         Assessment and Plan:      1. Metastatic breast cancer, ER, [place], HER2+ positive:   Was last treated in [facility] with Herceptin. We have continued Herceptin every 3 weeks as well as daily letrozole. CT CAP on 5/18/20 with stable disease.   -Continues to tolerate treatment very well with minimal SEs  --Echo from showed 4/28 showed normal EF without change. Continue to repeat every 3 months while on Herceptin. Will get in August with restaging  - Tumor markers are stable from yesterday  -will continue Herceptin today.   -since she has been doing so well, provider appt with every other infusion. Will follow-up with Dr. Aguiar in 6 weeks with new imaging      2. Bone metastasis: Has previously been on xgeva every 6 weeks. On calcium + vitamin D.  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 Note from oral surgery was scanned in chart. No further bisphosphonates. No current bone pain    3. Transaminitis: Mild. No hepatic lesions noted on CT from May. Has been noted previously in the past. Likely from medications. Will continue to monitor for now. If they were to continue to increase, would get US of liver.    4. GERD: Continue omeprazole daily. Symptoms much improved    Phone call duration: 8 minutes    Caryn Wheeler PA-C

## 2020-06-30 NOTE — PROGRESS NOTES
History of Present Illness: Hoda Brush is a 65 year old female with ER positive, HER2 positive metastatic left breast cancer (bone).     TREATMENT HISTORY:  A. Initial diagnosis with metastatic breast cancer in [facility].  Neoadjuvant CAF x 6.   B. Left mastectomy. Left axillary node dissection.  C.  Radiation in 1 dose to R iliac region. g Gy.  C. Herceptin for 2 years taxane for a prescribed course then stopped, monthly zoledronic acid.  She had 2 years of Herceptin with tamoxifen added after chemotherapy.  D.  Tamoxifen alone and zoledronic acid every 3 months.  E.  [place]  We restarted Herceptin every 3 weeks and continued tamoxifen. Bone targeted agent changed to denosumab every 6 weeks.   F. Tamoxifen changed to letrozole 10/8/19.      She is from [facility], formerly from [facility], and [place] the [place]  She lives with her daughter and is here for continuation of every 3 week Herceptin, which she receives along with tamoxifen.  Her tumor is ER positive, SD positive, HER-2 positive.  She had metastatic disease at the time of presentation with bone-only metastases by report.  She presented with metastatic disease in 02/2014.  Staging was initially bone-only metastases by report.  She did her staging in 02/2014 that showed that she had stage IV disease, T4N2M1 invasive ductal carcinoma of the left breast.  She had a right hip metastasis.  She underwent neoadjuvant CAF, left mastectomy, left axillary lymph node dissection and radiation.  She had radiation of the right iliac region where she had metastatic disease.  Pathology report from [facility] shows the tumor to be positive for ER in 75% of the cells, positive for SD in 40% of the cells, and the HER-2 was 3+ positive by immunohistochemistry.  The Ki-67 was 20%.  She had no evidence of nonbone metastatic disease on her PET/CT scan from 08/2017.     Had acute abdominal pain and N/V and found to have cholecystitis in the ER. Had  cholecystectomy on 8/31. Herceptin resumed on 9/13/19. Tamoxifen changed to letrozole on 10/8/19. Last restaging on 5/18/20 was stable.     Interval History:   On the phone with [place] .     -She is feeling well. Has no compliants  -Eating and drinking well  -No respiratory or GI symptoms  -No bleeding, edema, rash or neuropathy  -No f/c   -Occasional stomach pain and she takes omeprazole which helps     PHYSICAL EXAM:  Vital Signs 6/29/2020   Systolic 113   Diastolic 66   Pulse 80   Temperature 97.6   Respirations 16   Weight (LB) 177 lb 14.4 oz   O2 95       Objective:  General: patient sounds in no audible acute distress, alert and oriented, speech clear and fluid  Resp: Speaking in full sentences, no audible respiratory distress, no cough, no audible wheeze  Psych: able to articulate logical thoughts, able to abstract reason, no tangential thoughts, no hallucinations or delusions  His affect is normal    Labs:      6/29/2020 08:24   Sodium 142   Potassium 4.1   Chloride 111 (H)   Carbon Dioxide 26   Urea Nitrogen 15   Creatinine 0.79   GFR Estimate 79   GFR Estimate If Black >90   Calcium 8.5   Anion Gap 5   Albumin 3.2 (L)   Protein Total 7.3   Bilirubin Total 0.4   Alkaline Phosphatase 71    (H)   AST 77 (H)   CA 27-29 17   Glucose 85   WBC 5.3   Hemoglobin 12.2   Hematocrit 38.1   Platelet Count 199   RBC Count 4.02   MCV 95   MCH 30.3   MCHC 32.0   RDW 13.2   Diff Method Automated Method   % Neutrophils 48.4   % Lymphocytes 40.5   % Monocytes 6.3   % Eosinophils 4.0   % Basophils 0.6   % Immature Granulocytes 0.2   Nucleated RBCs 0   Absolute Neutrophil 2.6   Absolute Lymphocytes 2.1   Absolute Monocytes 0.3   Absolute Eosinophils 0.2   Absolute Basophils 0.0   Abs Immature Granulocytes 0.0   Absolute Nucleated RBC 0.0   CEA <0.5         Assessment and Plan:      1. Metastatic breast cancer, ER, MI, HER2+ positive:   Was last treated in [facility] with Herceptin. We have continued Herceptin  every 3 weeks as well as daily letrozole. CT CAP on 5/18/20 with stable disease.   -Continues to tolerate treatment very well with minimal SEs  --Echo from showed 4/28 showed normal EF without change. Continue to repeat every 3 months while on Herceptin. Will get in August with restaging  - Tumor markers are stable from yesterday  -will continue Herceptin today.   -since she has been doing so well, provider appt with every other infusion. Will follow-up with Dr. Aguiar in 6 weeks with new imaging      2. Bone metastasis: Has previously been on xgeva every 6 weeks. On calcium + vitamin D.  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 Note from oral surgery was scanned in chart. No further bisphosphonates. No current bone pain    3. Transaminitis: Mild. No hepatic lesions noted on CT from May. Has been noted previously in the past. Likely from medications. Will continue to monitor for now. If they were to continue to increase, would get US of liver.    4. GERD: Continue omeprazole daily. Symptoms much improved    Phone call duration: 8 minutes    Caryn Wheeler PA-C

## 2020-06-30 NOTE — PROGRESS NOTES
"Hoda Brush is a 65 year old female who is being evaluated via a billable telephone visit.      The patient has been notified of following:     \"This telephone visit will be conducted via a call between you and your physician/provider. We have found that certain health care needs can be provided without the need for a physical exam.  This service lets us provide the care you need with a short phone conversation.  If a prescription is necessary we can send it directly to your pharmacy.  If lab work is needed we can place an order for that and you can then stop by our lab to have the test done at a later time.    Telephone visits are billed at different rates depending on your insurance coverage. During this emergency period, for some insurers they may be billed the same as an in-person visit.  Please reach out to your insurance provider with any questions.    If during the course of the call the physician/provider feels a telephone visit is not appropriate, you will not be charged for this service.\"    Patient has given verbal consent for Telephone visit?  Yes    What phone number would you like to be contacted at?  [phone] (int. [facility]) option 1 then 7, [phone]    How would you like to obtain your AVS? Lane KIMBLE have reviewed and updated the patient's allergies and medication list.    Concerns: No concerns  Refills: No refills needed.        Sarai Montes CMA            "

## 2020-07-20 ENCOUNTER — OFFICE VISIT (OUTPATIENT)
Dept: FAMILY MEDICINE | Facility: CLINIC | Age: 65
End: 2020-07-20
Payer: COMMERCIAL

## 2020-07-20 VITALS
TEMPERATURE: 97.9 F | WEIGHT: 180.5 LBS | HEIGHT: 63 IN | DIASTOLIC BLOOD PRESSURE: 80 MMHG | OXYGEN SATURATION: 95 % | BODY MASS INDEX: 31.98 KG/M2 | SYSTOLIC BLOOD PRESSURE: 120 MMHG | HEART RATE: 103 BPM | RESPIRATION RATE: 16 BRPM

## 2020-07-20 DIAGNOSIS — Z00.00 ROUTINE GENERAL MEDICAL EXAMINATION AT A HEALTH CARE FACILITY: Primary | ICD-10-CM

## 2020-07-20 DIAGNOSIS — C50.919 METASTATIC BREAST CANCER: ICD-10-CM

## 2020-07-20 PROCEDURE — 99396 PREV VISIT EST AGE 40-64: CPT | Performed by: PHYSICIAN ASSISTANT

## 2020-07-20 ASSESSMENT — MIFFLIN-ST. JEOR: SCORE: 1337.71

## 2020-07-20 ASSESSMENT — PAIN SCALES - GENERAL: PAINLEVEL: NO PAIN (0)

## 2020-07-20 NOTE — PROGRESS NOTES
SUBJECTIVE:   CC: Hoda Brush is an 65 year old woman who presents for preventive health visit.     phone interp avail [phone] option 1 Language [place]     Healthy Habits:    Do you get at least three servings of calcium containing foods daily (dairy, green leafy vegetables, etc.)? 3    Amount of exercise or daily activities, outside of work: 7 day(s) per week    Problems taking medications regularly No    Medication side effects: No    Have you had an eye exam in the past two years? no    Do you see a dentist twice per year? No. Pt due.     Do you have sleep apnea, excessive snoring or daytime drowsiness?no          Today's PHQ-2 Score:   PHQ-2 ( 1999 [employer]) 7/20/2020 1/8/2019   Q1: Little interest or pleasure in doing things 0 0   Q2: Feeling down, depressed or hopeless 0 0   PHQ-2 Score 0 0   Q1: Little interest or pleasure in doing things - -   Q2: Feeling down, depressed or hopeless - -   PHQ-2 Score - -       Abuse: Current or Past(Physical, Sexual or Emotional)- No  Do you feel safe in your environment? Yes    Have you ever done Advance Care Planning? (For example, a Health Directive, POLST, or a discussion with a medical provider or your loved ones about your wishes): No, advance care planning information given to patient to review.  Patient plans to discuss their wishes with loved ones or provider.      Social History     Tobacco Use     Smoking status: Never Smoker     Smokeless tobacco: Never Used   Substance Use Topics     Alcohol use: No     If you drink alcohol do you typically have >3 drinks per day or >7 drinks per week? No                     Reviewed orders with patient.  Reviewed health maintenance and updated orders accordingly - Yes  Labs reviewed in EPIC  BP Readings from Last 3 Encounters:   07/20/20 120/80   06/30/20 116/79   06/29/20 113/66    Wt Readings from Last 3 Encounters:   07/20/20 81.9 kg (180 lb 8 oz)   06/29/20 80.7 kg (177 lb 14.4 oz)   06/08/20 79.7 kg (175  lb 11.2 oz)                  Patient Active Problem List   Diagnosis     Malignant neoplasm of left female breast, unspecified estrogen receptor status, unspecified site of breast (H)     Bone metastasis (H)     Cellulitis of left upper extremity     Metastatic breast cancer (H)     Acute cholecystitis     Past Surgical History:   Procedure Laterality Date     APPENDECTOMY       COLONOSCOPY N/A 2/1/2018    Procedure: COLONOSCOPY;  Colonoscopy;  Surgeon: Phillip Rowe MD;  Location: [facility]     ESOPHAGOSCOPY, GASTROSCOPY, DUODENOSCOPY (EGD), COMBINED N/A 2/16/2018    Procedure: COMBINED ESOPHAGOSCOPY, GASTROSCOPY, DUODENOSCOPY (EGD), BIOPSY SINGLE OR MULTIPLE;;  Surgeon: Paty Herman MD;  Location: [facility]     ESOPHAGOSCOPY, GASTROSCOPY, DUODENOSCOPY (EGD), COMBINED N/A 2/5/2020    Procedure: ESOPHAGOGASTRODUODENOSCOPY (EGD);  Surgeon: Anthony Alonso MD;  Location: [facility]     INSERT PORT VASCULAR ACCESS Right 9/15/2017    Procedure: INSERT PORT VASCULAR ACCESS;  Central venous chest port placement, right;  Surgeon: Dmitriy Hernandez PA-C;  Location: [facility]     LAPAROSCOPIC CHOLECYSTECTOMY N/A 8/31/2019    Procedure: LAPAROSCOPIC CHOLECYSTECTOMY;  Surgeon: Maldonado Billy MD;  Location: [facility] OR     MASTECTOMY Left 06/27/2014    and lymph node resection     MASTECTOMY SIMPLE BILATERAL      Mastectomy 06/27/2014       Social History     Tobacco Use     Smoking status: Never Smoker     Smokeless tobacco: Never Used   Substance Use Topics     Alcohol use: No     Family History   Problem Relation Age of Onset     Breast Cancer Mother 45     Lung Cancer Father         smoker     Breast Cancer Sister 61           Alternate mammogram schedule due to breast cancer history     Pertinent mammograms are reviewed under the imaging tab.  History of abnormal Pap smear: NO - age 30-65 PAP every 5 years with negative HPV co-testing recommended  PAP / HPV Latest Ref Rng & Units 8/8/2017   PAP - NIL   HPV  "16 DNA NEG Negative   HPV 18 DNA NEG Negative   OTHER HR HPV NEG Negative     Reviewed and updated as needed this visit by clinical staff  Tobacco  Allergies  Meds  Med Hx  Surg Hx  Fam Hx  Soc Hx        Reviewed and updated as needed this visit by Provider            ROS:  CONSTITUTIONAL: NEGATIVE for fever, chills, change in weight  INTEGUMENTARY/SKIN: NEGATIVE for worrisome rashes, moles or lesions  EYES: NEGATIVE for vision changes or irritation  ENT: NEGATIVE for ear, mouth and throat problems  RESP: NEGATIVE for significant cough or SOB  BREAST: NEGATIVE for masses, tenderness or discharge  CV: NEGATIVE for chest pain, palpitations or peripheral edema  GI: NEGATIVE for nausea, abdominal pain, heartburn, or change in bowel habits  : NEGATIVE for unusual urinary or vaginal symptoms. No vaginal bleeding.  MUSCULOSKELETAL: NEGATIVE for significant arthralgias or myalgia  NEURO: NEGATIVE for weakness, dizziness or paresthesias  PSYCHIATRIC: NEGATIVE for changes in mood or affect     OBJECTIVE:   /80   Pulse 103   Temp 97.9  F (36.6  C) (Temporal)   Resp 16   Ht 1.6 m (5' 2.99\")   Wt 81.9 kg (180 lb 8 oz)   SpO2 95%   BMI 31.98 kg/m    EXAM:  GENERAL: healthy, alert and no distress  EYES: Eyes grossly normal to inspection, PERRL and conjunctivae and sclerae normal  HENT: ear canals and TM's normal, nose and mouth without ulcers or lesions  NECK: no adenopathy, no asymmetry, masses, or scars and thyroid normal to palpation  RESP: lungs clear to auscultation - no rales, rhonchi or wheezes  CV: regular rate and rhythm, normal S1 S2, no S3 or S4, no murmur, click or rub, no peripheral edema and peripheral pulses strong  ABDOMEN: soft, nontender, no hepatosplenomegaly, no masses and bowel sounds normal  MS: no gross musculoskeletal defects noted, no edema  SKIN: no suspicious lesions or rashes  NEURO: Normal strength and tone, mentation intact and speech normal  PSYCH: mentation appears normal, " affect normal/bright    Diagnostic Test Results:  Labs reviewed in Epic    ASSESSMENT/PLAN:       ICD-10-CM    1. Routine general medical examination at a health care facility  Z00.00    2. Metastatic breast cancer (H)  C50.919      Patient Instructions   We'll wait to hear from your oncologist regarding the timing of vaccinations  Recheck 1 year  Return to clinic for any new or worsening symptoms or go to ER Urgent care in off hours     Preventive Health Recommendations  Female Ages 50 - 64    Yearly exam: See your health care provider every year in order to  o Review health changes.   o Discuss preventive care.    o Review your medicines if your doctor has prescribed any.      Get a Pap test every three years (unless you have an abnormal result and your provider advises testing more often).    If you get Pap tests with HPV test, you only need to test every 5 years, unless you have an abnormal result.     You do not need a Pap test if your uterus was removed (hysterectomy) and you have not had cancer.    You should be tested each year for STDs (sexually transmitted diseases) if you're at risk.     Have a mammogram every 1 to 2 years.    Have a colonoscopy at age 50, or have a yearly FIT test (stool test). These exams screen for colon cancer.      Have a cholesterol test every 5 years, or more often if advised.    Have a diabetes test (fasting glucose) every three years. If you are at risk for diabetes, you should have this test more often.     If you are at risk for osteoporosis (brittle bone disease), think about having a bone density scan (DEXA).    Shots: Get a flu shot each year. Get a tetanus shot every 10 years.    Nutrition:     Eat at least 5 servings of fruits and vegetables each day.    Eat whole-grain bread, whole-wheat pasta and brown rice instead of white grains and rice.    Get adequate Calcium and Vitamin D.     Lifestyle    Exercise at least 150 minutes a week (30 minutes a day, 5 days a week). This  "will help you control your weight and prevent disease.    Limit alcohol to one drink per day.    No smoking.     Wear sunscreen to prevent skin cancer.     See your dentist every six months for an exam and cleaning.    See your eye doctor every 1 to 2 years.        COUNSELING:   Reviewed preventive health counseling, as reflected in patient instructions    Estimated body mass index is 31.98 kg/m  as calculated from the following:    Height as of this encounter: 1.6 m (5' 2.99\").    Weight as of this encounter: 81.9 kg (180 lb 8 oz).         reports that she has never smoked. She has never used smokeless tobacco.      Counseling Resources:  ATP IV Guidelines  Pooled Cohorts Equation Calculator  Breast Cancer Risk Calculator  FRAX Risk Assessment  ICSI Preventive Guidelines  Dietary Guidelines for Americans, 2010  USDA's MyPlate  ASA Prophylaxis  Lung CA Screening    Steffi Webb PA-C  [facility]  "

## 2020-07-20 NOTE — PATIENT INSTRUCTIONS
We'll wait to hear from your oncologist regarding the timing of vaccinations  Recheck 1 year  Return to clinic for any new or worsening symptoms or go to ER Urgent care in off hours     Preventive Health Recommendations  Female Ages 50 - 64    Yearly exam: See your health care provider every year in order to  o Review health changes.   o Discuss preventive care.    o Review your medicines if your doctor has prescribed any.      Get a Pap test every three years (unless you have an abnormal result and your provider advises testing more often).    If you get Pap tests with HPV test, you only need to test every 5 years, unless you have an abnormal result.     You do not need a Pap test if your uterus was removed (hysterectomy) and you have not had cancer.    You should be tested each year for STDs (sexually transmitted diseases) if you're at risk.     Have a mammogram every 1 to 2 years.    Have a colonoscopy at age 50, or have a yearly FIT test (stool test). These exams screen for colon cancer.      Have a cholesterol test every 5 years, or more often if advised.    Have a diabetes test (fasting glucose) every three years. If you are at risk for diabetes, you should have this test more often.     If you are at risk for osteoporosis (brittle bone disease), think about having a bone density scan (DEXA).    Shots: Get a flu shot each year. Get a tetanus shot every 10 years.    Nutrition:     Eat at least 5 servings of fruits and vegetables each day.    Eat whole-grain bread, whole-wheat pasta and brown rice instead of white grains and rice.    Get adequate Calcium and Vitamin D.     Lifestyle    Exercise at least 150 minutes a week (30 minutes a day, 5 days a week). This will help you control your weight and prevent disease.    Limit alcohol to one drink per day.    No smoking.     Wear sunscreen to prevent skin cancer.     See your dentist every six months for an exam and cleaning.    See your eye doctor every 1 to 2  years.

## 2020-07-21 ENCOUNTER — INFUSION THERAPY VISIT (OUTPATIENT)
Dept: ONCOLOGY | Facility: CLINIC | Age: 65
End: 2020-07-21
Attending: INTERNAL MEDICINE
Payer: COMMERCIAL

## 2020-07-21 ENCOUNTER — APPOINTMENT (OUTPATIENT)
Dept: LAB | Facility: CLINIC | Age: 65
End: 2020-07-21
Attending: INTERNAL MEDICINE
Payer: COMMERCIAL

## 2020-07-21 VITALS
SYSTOLIC BLOOD PRESSURE: 107 MMHG | RESPIRATION RATE: 16 BRPM | DIASTOLIC BLOOD PRESSURE: 64 MMHG | BODY MASS INDEX: 31.68 KG/M2 | WEIGHT: 178.8 LBS | TEMPERATURE: 97.7 F | OXYGEN SATURATION: 92 % | HEART RATE: 76 BPM

## 2020-07-21 DIAGNOSIS — C50.912 MALIGNANT NEOPLASM OF LEFT FEMALE BREAST, UNSPECIFIED ESTROGEN RECEPTOR STATUS, UNSPECIFIED SITE OF BREAST (H): Primary | ICD-10-CM

## 2020-07-21 LAB
ALBUMIN SERPL-MCNC: 3.3 G/DL (ref 3.4–5)
ALP SERPL-CCNC: 70 U/L (ref 40–150)
ALT SERPL W P-5'-P-CCNC: 87 U/L (ref 0–50)
ANION GAP SERPL CALCULATED.3IONS-SCNC: 4 MMOL/L (ref 3–14)
AST SERPL W P-5'-P-CCNC: 39 U/L (ref 0–45)
BASOPHILS # BLD AUTO: 0 10E9/L (ref 0–0.2)
BASOPHILS NFR BLD AUTO: 0.3 %
BILIRUB SERPL-MCNC: 0.3 MG/DL (ref 0.2–1.3)
BUN SERPL-MCNC: 13 MG/DL (ref 7–30)
CALCIUM SERPL-MCNC: 8.7 MG/DL (ref 8.5–10.1)
CANCER AG27-29 SERPL-ACNC: <5 U/ML (ref 0–39)
CEA SERPL-MCNC: 0.7 UG/L (ref 0–2.5)
CHLORIDE SERPL-SCNC: 106 MMOL/L (ref 94–109)
CO2 SERPL-SCNC: 29 MMOL/L (ref 20–32)
CREAT SERPL-MCNC: 0.79 MG/DL (ref 0.52–1.04)
DIFFERENTIAL METHOD BLD: ABNORMAL
EOSINOPHIL # BLD AUTO: 0.2 10E9/L (ref 0–0.7)
EOSINOPHIL NFR BLD AUTO: 3.8 %
ERYTHROCYTE [DISTWIDTH] IN BLOOD BY AUTOMATED COUNT: 13.2 % (ref 10–15)
GFR SERPL CREATININE-BSD FRML MDRD: 79 ML/MIN/{1.73_M2}
GLUCOSE SERPL-MCNC: 95 MG/DL (ref 70–99)
HCT VFR BLD AUTO: 38.7 % (ref 35–47)
HGB BLD-MCNC: 11.9 G/DL (ref 11.7–15.7)
IMM GRANULOCYTES # BLD: 0 10E9/L (ref 0–0.4)
IMM GRANULOCYTES NFR BLD: 0.3 %
LYMPHOCYTES # BLD AUTO: 2.7 10E9/L (ref 0.8–5.3)
LYMPHOCYTES NFR BLD AUTO: 44 %
MCH RBC QN AUTO: 29.5 PG (ref 26.5–33)
MCHC RBC AUTO-ENTMCNC: 30.7 G/DL (ref 31.5–36.5)
MCV RBC AUTO: 96 FL (ref 78–100)
MONOCYTES # BLD AUTO: 0.5 10E9/L (ref 0–1.3)
MONOCYTES NFR BLD AUTO: 8.2 %
NEUTROPHILS # BLD AUTO: 2.7 10E9/L (ref 1.6–8.3)
NEUTROPHILS NFR BLD AUTO: 43.4 %
NRBC # BLD AUTO: 0 10*3/UL
NRBC BLD AUTO-RTO: 0 /100
PLATELET # BLD AUTO: 181 10E9/L (ref 150–450)
POTASSIUM SERPL-SCNC: 3.9 MMOL/L (ref 3.4–5.3)
PROT SERPL-MCNC: 7.5 G/DL (ref 6.8–8.8)
RBC # BLD AUTO: 4.03 10E12/L (ref 3.8–5.2)
SODIUM SERPL-SCNC: 139 MMOL/L (ref 133–144)
WBC # BLD AUTO: 6.1 10E9/L (ref 4–11)

## 2020-07-21 PROCEDURE — 90715 TDAP VACCINE 7 YRS/> IM: CPT | Mod: ZF | Performed by: INTERNAL MEDICINE

## 2020-07-21 PROCEDURE — 85025 COMPLETE CBC W/AUTO DIFF WBC: CPT | Performed by: INTERNAL MEDICINE

## 2020-07-21 PROCEDURE — 86300 IMMUNOASSAY TUMOR CA 15-3: CPT | Performed by: INTERNAL MEDICINE

## 2020-07-21 PROCEDURE — 96413 CHEMO IV INFUSION 1 HR: CPT

## 2020-07-21 PROCEDURE — 25000128 H RX IP 250 OP 636: Mod: ZF | Performed by: INTERNAL MEDICINE

## 2020-07-21 PROCEDURE — 80053 COMPREHEN METABOLIC PANEL: CPT | Performed by: INTERNAL MEDICINE

## 2020-07-21 PROCEDURE — 82378 CARCINOEMBRYONIC ANTIGEN: CPT | Performed by: INTERNAL MEDICINE

## 2020-07-21 PROCEDURE — 90732 PPSV23 VACC 2 YRS+ SUBQ/IM: CPT | Mod: ZF | Performed by: INTERNAL MEDICINE

## 2020-07-21 PROCEDURE — 25800030 ZZH RX IP 258 OP 636: Mod: ZF | Performed by: INTERNAL MEDICINE

## 2020-07-21 PROCEDURE — G0009 ADMIN PNEUMOCOCCAL VACCINE: HCPCS

## 2020-07-21 PROCEDURE — 90472 IMMUNIZATION ADMIN EACH ADD: CPT

## 2020-07-21 RX ORDER — HEPARIN SODIUM (PORCINE) LOCK FLUSH IV SOLN 100 UNIT/ML 100 UNIT/ML
5 SOLUTION INTRAVENOUS EVERY 8 HOURS PRN
Status: DISCONTINUED | OUTPATIENT
Start: 2020-07-21 | End: 2020-07-21 | Stop reason: HOSPADM

## 2020-07-21 RX ORDER — HEPARIN SODIUM (PORCINE) LOCK FLUSH IV SOLN 100 UNIT/ML 100 UNIT/ML
5 SOLUTION INTRAVENOUS EVERY 8 HOURS
Status: DISCONTINUED | OUTPATIENT
Start: 2020-07-21 | End: 2020-07-21 | Stop reason: HOSPADM

## 2020-07-21 RX ADMIN — TETANUS TOXOID, REDUCED DIPHTHERIA TOXOID AND ACELLULAR PERTUSSIS VACCINE, ADSORBED 0.5 ML: 5; 2.5; 8; 8; 2.5 SUSPENSION INTRAMUSCULAR at 09:10

## 2020-07-21 RX ADMIN — Medication 5 ML: at 07:27

## 2020-07-21 RX ADMIN — PNEUMOCOCCAL VACCINE POLYVALENT 0.5 ML
25; 25; 25; 25; 25; 25; 25; 25; 25; 25; 25; 25; 25; 25; 25; 25; 25; 25; 25; 25; 25; 25; 25 INJECTION, SOLUTION INTRAMUSCULAR; SUBCUTANEOUS at 09:08

## 2020-07-21 RX ADMIN — Medication 5 ML: at 09:13

## 2020-07-21 RX ADMIN — TRASTUZUMAB 450 MG: 150 INJECTION, POWDER, LYOPHILIZED, FOR SOLUTION INTRAVENOUS at 08:39

## 2020-07-21 ASSESSMENT — PAIN SCALES - GENERAL: PAINLEVEL: NO PAIN (0)

## 2020-07-21 NOTE — PATIENT INSTRUCTIONS
[facility] Triage and after hours / weekends / holidays:  [phone]    Please call the triage or after hours line if you experience a temperature greater than or equal to 100.5, shaking chills, have uncontrolled nausea, vomiting and/or diarrhea, dizziness, shortness of breath, chest pain, bleeding, unexplained bruising, or if you have any other new/concerning symptoms, questions or concerns.      If you are having any concerning symptoms or wish to speak to a provider before your next infusion visit, please call your care coordinator or triage to notify them so we can adequately serve you.     If you need a refill on a narcotic prescription or other medication, please call before your infusion appointment.                 July 2020 Sunday Monday Tuesday Wednesday Thursday Friday Saturday                  1     2     3     4       5     6     7     8     9     10     11       12     13     14     15     16     17     18       19     20    PHYSICAL  10:20 AM   (60 min.)   Steffi Webb PA-C   [facility] 21    [facility] DRAW   7:30 AM   (60 min.)   [facility]   [facility] Lab Draw    [facility] ONC INFUSION 60   8:00 AM   (60 min.)    ONCOLOGY INFUSION   [facility] 22     23     24     25       26     27     28     29     30     31                      August 2020 Sunday Monday Tuesday Wednesday Thursday Friday Saturday                                 1       2     3     4     5     6     7     8       9     10    [facility] DRAW   7:30 AM   (15 min.)    [facility] DRAW   [facility] Lab Draw    ECHO COMPLETE   8:00 AM   (60 min.)   [id number]   [facility] Cardiac Services    CT CHEST/ABDOMEN/PELVIS W   9:00 AM   (20 min.)   [id number]   [facility] Imaging Center CT 11    [facility] RETURN   7:45 AM   (30 min.)   Lisandro Aguiar MD   [facility] ONC INFUSION 60   8:30 AM   (60 min.)    ONCOLOGY  [facility] 12     13     14     15       16     17     18     19     20     21     22       23     24     25     26     27     28     29       30     31                                             Recent Results (from the past 24 hour(s))   CBC with platelets differential    Collection Time: 07/21/20  7:32 AM   Result Value Ref Range    WBC 6.1 4.0 - 11.0 10e9/L    RBC Count 4.03 3.8 - 5.2 10e12/L    Hemoglobin 11.9 11.7 - 15.7 g/dL    Hematocrit 38.7 35.0 - 47.0 %    MCV 96 78 - 100 fl    MCH 29.5 26.5 - 33.0 pg    MCHC 30.7 (L) 31.5 - 36.5 g/dL    RDW 13.2 10.0 - 15.0 %    Platelet Count 181 150 - 450 10e9/L    Diff Method Automated Method     % Neutrophils 43.4 %    % Lymphocytes 44.0 %    % Monocytes 8.2 %    % Eosinophils 3.8 %    % Basophils 0.3 %    % Immature Granulocytes 0.3 %    Nucleated RBCs 0 0 /100    Absolute Neutrophil 2.7 1.6 - 8.3 10e9/L    Absolute Lymphocytes 2.7 0.8 - 5.3 10e9/L    Absolute Monocytes 0.5 0.0 - 1.3 10e9/L    Absolute Eosinophils 0.2 0.0 - 0.7 10e9/L    Absolute Basophils 0.0 0.0 - 0.2 10e9/L    Abs Immature Granulocytes 0.0 0 - 0.4 10e9/L    Absolute Nucleated RBC 0.0    Comprehensive metabolic panel    Collection Time: 07/21/20  7:32 AM   Result Value Ref Range    Sodium 139 133 - 144 mmol/L    Potassium 3.9 3.4 - 5.3 mmol/L    Chloride 106 94 - 109 mmol/L    Carbon Dioxide 29 20 - 32 mmol/L    Anion Gap 4 3 - 14 mmol/L    Glucose 95 70 - 99 mg/dL    Urea Nitrogen 13 7 - 30 mg/dL    Creatinine 0.79 0.52 - 1.04 mg/dL    GFR Estimate 79 >60 mL/min/[1.73_m2]    GFR Estimate If Black >90 >60 mL/min/[1.73_m2]    Calcium 8.7 8.5 - 10.1 mg/dL    Bilirubin Total 0.3 0.2 - 1.3 mg/dL    Albumin 3.3 (L) 3.4 - 5.0 g/dL    Protein Total 7.5 6.8 - 8.8 g/dL    Alkaline Phosphatase 70 40 - 150 U/L    ALT 87 (H) 0 - 50 U/L    AST 39 0 - 45 U/L

## 2020-07-21 NOTE — PROGRESS NOTES
Infusion Nursing Note:  Hoda Brush presents today for Cycle 49 Day 1 Herceptin, Tdap and Pneumovax vaccines.   Patient seen by provider today: No    Note: Patient reports no new concerns at this time. States that she has never had a reaction to a vaccine that she knows of. VIS given and reviewed with patient.     Intravenous Access:  Implanted Port.    Treatment Conditions:  Lab Results   Component Value Date    HGB 11.9 07/21/2020     Lab Results   Component Value Date    WBC 6.1 07/21/2020      Lab Results   Component Value Date    ANEU 2.7 07/21/2020     Lab Results   Component Value Date     07/21/2020      Results reviewed, labs MET treatment parameters, ok to proceed with treatment.  ECHO/MUGA completed 4/28/20  EF 55-60%.      Post Infusion Assessment:  Patient tolerated infusion without incident.  Patient tolerated Tdap and Pneumovax injections to Right Deltoid without incident.  Blood return noted pre and post infusion.  Access discontinued per protocol.       Discharge Plan:   Patient declined prescription refills.  Discharge instructions reviewed with: Patient.  AVS to patient via [employer]. Patient will return 8/10/20 for CT, ECHO, labs and 8/11/20 for next RTC and infusion appointment.   Patient discharged in stable condition accompanied by: self.  Face to Face time: 0.    CECILIA SANCHEZ RN

## 2020-08-09 NOTE — PROGRESS NOTES
Hoda is a patient from [facility] and is seen here for continuation of care for her metastatic ER+HER2- breast cancer.  She is a [employer] refugee from [facility] and was seen in clinic with her daughter.  The only data we have from [facility] is an English translation of her records.       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  On 02/11/2014, she was seen by an oncologist.  The clinical staging of T4b N2 M1 was noted.   Her breast cancer was on the left side. There was no right breast cancer, confirmed by the patient and her daughter, correcting a possible error in the translated records.  ER was positive in 100% of the cells, ME at 14% and HER2 was 3+ positive.  It appears that this histopathologic information is on the mastectomy specimen. She underwent an MRI for staging of presumptive bone metastases which was performed 03/02/2014.  There were skeletal metastases in the thoracic vertebrae at 12, L1, L3, L5 and S1 vertebral body, ranging in size from 0.7-3.0 cm in size.  Ischial and right femur were also involved, as well as a large iliac mass measuring about 15 cm in the uterus. 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   Radiation Oncology consultation was performed 03/11/2014, and she was given radiation in 1 dose of 6 Gy to the right iliac lesion.        With the initial diagnosis, she initiated treatment with 6 cycles of CAF neoadjuvant chemotherapy 02/14, 07/07, 03/28, 04/18/14, 05/08 and 05/29/14. She also received monthly zoledronic acid.  She then underwent a modified left mastectomy of Palacios type and left lymphadenoectomy on 06/27/2014.   Pathologic examination showed number at [facility] was [id number] showed infiltrative grade 2 ductal cancer with 6 level 1 metastatic lympFollow up with Jossie for visits and trastuzumab on 2-5, 2-26, 3-19 with CBC, CMP.  Echocardiogram on 3-19.  Follow up with me 4-9 with CBC, CMP,  CA27.29 and CEA and with CT CAP on 4-8.h nodes and 3 level 2 metastatic lymph nodes.  The differentiation was intermediate.  The tumor was ER positive 70% of the cells, WI positive in 40% of the cells and HER2 was 3+ by immunohistochemistry and the Ki-67 labeling index was 20%. Her staging after surgery was stage IV, pT4b N2 M1.  I don't see a biopsy of the skeletal metastases.  She then had continuation with chemotherapy with 4 cycles of Herceptin and taxane with monthly zoledronic acid.  Tamoxifen was initiated.  She then had two years of Herceptin and a decision was made not to continue further Herceptin.  She continued on zoledronic acid every 3 months. She was clinically stable. [place] then [place] the [place] new refugee from [facility].  She has now been changed to letrozole.  Denosumab has now been held for new diagnosis of osteonecrosis of the R maxilla.     History of cholecystectomy 2020.      TREATMENT HISTORY:  A. Initial diagnosis with metastatic breast cancer in [facility].  Neoadjuvant CAF x 6.   B. Left mastectomy. Left axillary node dissection.  C.  Radiation in 1 dose to R iliac region.  C. Herceptin for 2 years taxane for a prescribed course then stopped, monthly zoledronic acid.  She had 2 years of Herceptin with tamoxifen added after chemotherapy.  D.  Tamoxifen alone and zoledronic acid every 3 months.  E.  [place]  We restarted Herceptin every 3 weeks and continued tamoxifen. Bone targeted agent changed to denosumab every 9 weeks.  F.  Xgeva discontinued 12-17-19 because of dental issues.       INTERVAL HISTORY  She has been doing generally well.  She has some low back pain which is chronic.  No fatigue, no depression, no diarrhea.  She went to the dental school and has a right maxillary area of possible osteonecrosis.  She is off Xgeva because of this.  She saw a dentist 3 weeks ago.  She would like to see a staff dentist.     REVIEW OF SYSTEMS:  She denies fevers or chills, cough, chest  pain, shortness of breath, nausea or vomiting, constipation, diarrhea, bone pain, or headache.  She does have a chronic low back pain.  A 10 point review of systems is negative.    PHYSICAL EXAM:  /81   Pulse 90   Temp 97.9  F (36.6  C)   Wt 82.1 kg (181 lb)   SpO2 97%   BMI 32.07 kg/m    -- General: no acute distress; well-developed and well-nourished   -- Lymph: no lymphadenopathy in the cervical, submandibular, supraclavicular, axillary, or inguinal areas  -- Cardiovascular: regular rate and rhythm, no murmurs; no peripheral edema or JVD  -- Pulmonary: lungs clear bilaterally, equal diaphragmatic excursion, no wheezes or crackles  -- Gastrointestinal: abdomen soft, non-tender, non-distended; bowel sounds present; no organomegaly  -- Musculoskeletal: no swelling or erythema of joints  -- Integumentary: no rashes  -- Neurologic: alert and oriented to self, place, time; no gross abnormalities  -- Psychiatric: appropriate affect, cooperative       Labs  WBC 6.9, Hb 12.7, Plt 228  Electrolytes, renal function wnl.    ALT 98, AST 56  Imaging  CT CAP on 8-10-20    IMPRESSION:  1.  Stable extensive osseous metastatic disease without evidence of  new lesions.  2.  Stable pulmonary nodules measuring up to 6 mm in the left lower  lobe. Recommend continued surveillance.      LAURA GOODWIN MD        ASSESSMENT AND PLAN:     1.  Hoda Brush is a 65-year-old woman with a history of ER-positive, KS-positive, HER2 positive breast cancer.  She is from [facility], formally from [facility], and came to live in the [place] with [place].  She is here for continuation of every 3-week Herceptin, which she has bee no.  N receiving along with tamoxifen daily.  The tumor is ER positive, KS positive and HER2 positive.  She had metastatic disease at the time of presentation with bone-only metastases by report.  She underwent neoadjuvant CAF, had a left mastectomy, left axillary lymph node dissection, radiation to the  right hip, which included the right iliac region where she had metastatic disease.  She was initially on tamoxifen but then was switched to letrozole.  She continues on this and every 3-week trastuzumab.  She has had no evidence of disease progression for the last 3 years.  We have continued Herceptin every 3 weeks as well as daily letrozole. CT CAP on 8/10/20 showed stable disease.   2.  Restaging. We reviewed imaging with her and she is pleased that there is no evidence of progression of her metastatic breast cancer. --Continue to tolerate treatment well. - Tumor markers  have been stable.    3.  Mild transaminase elevations.  Restaging showed no evidence of liver metastases.  These LFT elevations may be due to fatty liver.  4.  Continue the letrozole.   5.  Echo. Stable EF. --Echo in 4/2020 showed normal EF without change 55 to 60%.  6.  Discussion of bone health and right maxillary osteonecrosis.  She will continue with calcium and vitamin D.  Restart Xgeva in 3 weeks for osteoporosis of the right mandible has resolved. Dental consult at dental school.  On calcium + vitamin D.    7. Follow up.  Oral surgery consult this week. Follow up with Herceptin 9-1 CBC, CMP no provider. Follow up 9-22 with KARISSA phone visit CBC, CMP, CA27.29 and CEA with Herceptin, Xgeva. Follow up 10-13 with Herceptin, CBC, CMP no provider. Follow up 11-3 with CBC, CMP, CA27.29, CEA and Herceptin, Xgeva, echocardiogram.         Thank you for allowing us to participate in this patient's care.      Sincerely,      Lisandro Aguiar MD    [facility]  [phone].       Patient was interviewed and discussed with Dr. Aguiar     I spent 15 minutes with the patient more than 50% of which was in counseling and coordination of care.

## 2020-08-10 ENCOUNTER — ANCILLARY PROCEDURE (OUTPATIENT)
Dept: CT IMAGING | Facility: CLINIC | Age: 65
End: 2020-08-10
Attending: INTERNAL MEDICINE
Payer: COMMERCIAL

## 2020-08-10 ENCOUNTER — ANCILLARY PROCEDURE (OUTPATIENT)
Dept: CARDIOLOGY | Facility: CLINIC | Age: 65
End: 2020-08-10
Attending: INTERNAL MEDICINE
Payer: COMMERCIAL

## 2020-08-10 VITALS
HEART RATE: 83 BPM | WEIGHT: 180.2 LBS | RESPIRATION RATE: 18 BRPM | DIASTOLIC BLOOD PRESSURE: 69 MMHG | OXYGEN SATURATION: 95 % | SYSTOLIC BLOOD PRESSURE: 105 MMHG | BODY MASS INDEX: 31.93 KG/M2

## 2020-08-10 DIAGNOSIS — C50.912 MALIGNANT NEOPLASM OF LEFT FEMALE BREAST, UNSPECIFIED ESTROGEN RECEPTOR STATUS, UNSPECIFIED SITE OF BREAST (H): ICD-10-CM

## 2020-08-10 DIAGNOSIS — Z51.11 ENCOUNTER FOR ANTINEOPLASTIC CHEMOTHERAPY: ICD-10-CM

## 2020-08-10 LAB
ALBUMIN SERPL-MCNC: 3.4 G/DL (ref 3.4–5)
ALP SERPL-CCNC: 79 U/L (ref 40–150)
ALT SERPL W P-5'-P-CCNC: 98 U/L (ref 0–50)
ANION GAP SERPL CALCULATED.3IONS-SCNC: 6 MMOL/L (ref 3–14)
AST SERPL W P-5'-P-CCNC: 56 U/L (ref 0–45)
BASOPHILS # BLD AUTO: 0.1 10E9/L (ref 0–0.2)
BASOPHILS NFR BLD AUTO: 0.7 %
BILIRUB SERPL-MCNC: 0.3 MG/DL (ref 0.2–1.3)
BUN SERPL-MCNC: 17 MG/DL (ref 7–30)
CALCIUM SERPL-MCNC: 8.8 MG/DL (ref 8.5–10.1)
CANCER AG27-29 SERPL-ACNC: 9 U/ML (ref 0–39)
CEA SERPL-MCNC: <0.5 UG/L (ref 0–2.5)
CHLORIDE SERPL-SCNC: 105 MMOL/L (ref 94–109)
CO2 SERPL-SCNC: 26 MMOL/L (ref 20–32)
CREAT SERPL-MCNC: 0.83 MG/DL (ref 0.52–1.04)
DIFFERENTIAL METHOD BLD: NORMAL
EOSINOPHIL # BLD AUTO: 0.3 10E9/L (ref 0–0.7)
EOSINOPHIL NFR BLD AUTO: 3.9 %
ERYTHROCYTE [DISTWIDTH] IN BLOOD BY AUTOMATED COUNT: 13.3 % (ref 10–15)
GFR SERPL CREATININE-BSD FRML MDRD: 75 ML/MIN/{1.73_M2}
GLUCOSE SERPL-MCNC: 97 MG/DL (ref 70–99)
HCT VFR BLD AUTO: 40 % (ref 35–47)
HGB BLD-MCNC: 12.7 G/DL (ref 11.7–15.7)
IMM GRANULOCYTES # BLD: 0 10E9/L (ref 0–0.4)
IMM GRANULOCYTES NFR BLD: 0.1 %
LYMPHOCYTES # BLD AUTO: 2.5 10E9/L (ref 0.8–5.3)
LYMPHOCYTES NFR BLD AUTO: 36 %
MCH RBC QN AUTO: 29.8 PG (ref 26.5–33)
MCHC RBC AUTO-ENTMCNC: 31.8 G/DL (ref 31.5–36.5)
MCV RBC AUTO: 94 FL (ref 78–100)
MONOCYTES # BLD AUTO: 0.5 10E9/L (ref 0–1.3)
MONOCYTES NFR BLD AUTO: 7 %
NEUTROPHILS # BLD AUTO: 3.6 10E9/L (ref 1.6–8.3)
NEUTROPHILS NFR BLD AUTO: 52.3 %
NRBC # BLD AUTO: 0 10*3/UL
NRBC BLD AUTO-RTO: 0 /100
PLATELET # BLD AUTO: 228 10E9/L (ref 150–450)
POTASSIUM SERPL-SCNC: 3.6 MMOL/L (ref 3.4–5.3)
PROT SERPL-MCNC: 8.2 G/DL (ref 6.8–8.8)
RBC # BLD AUTO: 4.26 10E12/L (ref 3.8–5.2)
SODIUM SERPL-SCNC: 137 MMOL/L (ref 133–144)
WBC # BLD AUTO: 6.9 10E9/L (ref 4–11)

## 2020-08-10 PROCEDURE — 85025 COMPLETE CBC W/AUTO DIFF WBC: CPT | Performed by: INTERNAL MEDICINE

## 2020-08-10 PROCEDURE — 86300 IMMUNOASSAY TUMOR CA 15-3: CPT | Performed by: INTERNAL MEDICINE

## 2020-08-10 PROCEDURE — 80053 COMPREHEN METABOLIC PANEL: CPT | Performed by: INTERNAL MEDICINE

## 2020-08-10 PROCEDURE — 82378 CARCINOEMBRYONIC ANTIGEN: CPT | Performed by: INTERNAL MEDICINE

## 2020-08-10 PROCEDURE — 25000128 H RX IP 250 OP 636: Performed by: INTERNAL MEDICINE

## 2020-08-10 RX ORDER — HEPARIN SODIUM (PORCINE) LOCK FLUSH IV SOLN 100 UNIT/ML 100 UNIT/ML
5 SOLUTION INTRAVENOUS ONCE
Status: COMPLETED | OUTPATIENT
Start: 2020-08-10 | End: 2020-08-10

## 2020-08-10 RX ORDER — IOPAMIDOL 755 MG/ML
111 INJECTION, SOLUTION INTRAVASCULAR ONCE
Status: COMPLETED | OUTPATIENT
Start: 2020-08-10 | End: 2020-08-10

## 2020-08-10 RX ORDER — HEPARIN SODIUM (PORCINE) LOCK FLUSH IV SOLN 100 UNIT/ML 100 UNIT/ML
5 SOLUTION INTRAVENOUS EVERY 8 HOURS
Status: DISCONTINUED | OUTPATIENT
Start: 2020-08-10 | End: 2020-08-18 | Stop reason: HOSPADM

## 2020-08-10 RX ADMIN — IOPAMIDOL 111 ML: 755 INJECTION, SOLUTION INTRAVASCULAR at 09:03

## 2020-08-10 RX ADMIN — HEPARIN SODIUM (PORCINE) LOCK FLUSH IV SOLN 100 UNIT/ML 5 ML: 100 SOLUTION at 09:14

## 2020-08-10 RX ADMIN — Medication 5 ML: at 08:21

## 2020-08-10 ASSESSMENT — PAIN SCALES - GENERAL: PAINLEVEL: NO PAIN (0)

## 2020-08-10 NOTE — NURSING NOTE
Chief Complaint   Patient presents with     Port Draw     power needle. heparin locked, vitals checked, labs by venipuncture     Port very sluggish to draw back, heparin locked, left accessed for imaging,. Labs drawn from right hand, venipuncture

## 2020-08-11 ENCOUNTER — ONCOLOGY VISIT (OUTPATIENT)
Dept: ONCOLOGY | Facility: CLINIC | Age: 65
End: 2020-08-11
Attending: INTERNAL MEDICINE
Payer: COMMERCIAL

## 2020-08-11 VITALS
BODY MASS INDEX: 32.07 KG/M2 | DIASTOLIC BLOOD PRESSURE: 81 MMHG | TEMPERATURE: 97.9 F | WEIGHT: 181 LBS | OXYGEN SATURATION: 97 % | HEART RATE: 90 BPM | SYSTOLIC BLOOD PRESSURE: 121 MMHG

## 2020-08-11 DIAGNOSIS — Z51.11 ENCOUNTER FOR ANTINEOPLASTIC CHEMOTHERAPY: ICD-10-CM

## 2020-08-11 DIAGNOSIS — C79.51 BONE METASTASIS: Primary | ICD-10-CM

## 2020-08-11 DIAGNOSIS — C50.912 MALIGNANT NEOPLASM OF LEFT FEMALE BREAST, UNSPECIFIED ESTROGEN RECEPTOR STATUS, UNSPECIFIED SITE OF BREAST (H): Primary | ICD-10-CM

## 2020-08-11 PROCEDURE — G0463 HOSPITAL OUTPT CLINIC VISIT: HCPCS | Mod: ZF

## 2020-08-11 PROCEDURE — 99214 OFFICE O/P EST MOD 30 MIN: CPT | Mod: ZP | Performed by: INTERNAL MEDICINE

## 2020-08-11 PROCEDURE — 25000128 H RX IP 250 OP 636: Mod: ZF | Performed by: INTERNAL MEDICINE

## 2020-08-11 PROCEDURE — 96413 CHEMO IV INFUSION 1 HR: CPT

## 2020-08-11 PROCEDURE — 25800030 ZZH RX IP 258 OP 636: Mod: ZF | Performed by: INTERNAL MEDICINE

## 2020-08-11 RX ORDER — MEPERIDINE HYDROCHLORIDE 25 MG/ML
25 INJECTION INTRAMUSCULAR; INTRAVENOUS; SUBCUTANEOUS EVERY 30 MIN PRN
Status: CANCELLED | OUTPATIENT
Start: 2020-09-01

## 2020-08-11 RX ORDER — EPINEPHRINE 1 MG/ML
0.3 INJECTION, SOLUTION INTRAMUSCULAR; SUBCUTANEOUS EVERY 5 MIN PRN
Status: CANCELLED | OUTPATIENT
Start: 2020-09-01

## 2020-08-11 RX ORDER — HEPARIN SODIUM (PORCINE) LOCK FLUSH IV SOLN 100 UNIT/ML 100 UNIT/ML
5 SOLUTION INTRAVENOUS EVERY 8 HOURS
Status: CANCELLED | OUTPATIENT
Start: 2020-09-01

## 2020-08-11 RX ORDER — ACETAMINOPHEN 325 MG/1
650 TABLET ORAL
Status: CANCELLED | OUTPATIENT
Start: 2020-09-01

## 2020-08-11 RX ORDER — ALBUTEROL SULFATE 0.83 MG/ML
2.5 SOLUTION RESPIRATORY (INHALATION)
Status: CANCELLED | OUTPATIENT
Start: 2020-09-01

## 2020-08-11 RX ORDER — DIPHENHYDRAMINE HYDROCHLORIDE 50 MG/ML
50 INJECTION INTRAMUSCULAR; INTRAVENOUS
Status: CANCELLED
Start: 2020-09-01

## 2020-08-11 RX ORDER — LORAZEPAM 2 MG/ML
0.5 INJECTION INTRAMUSCULAR EVERY 4 HOURS PRN
Status: CANCELLED
Start: 2020-09-01

## 2020-08-11 RX ORDER — DIPHENHYDRAMINE HCL 25 MG
50 CAPSULE ORAL ONCE
Status: CANCELLED
Start: 2020-09-01

## 2020-08-11 RX ORDER — CHLORHEXIDINE GLUCONATE ORAL RINSE 1.2 MG/ML
SOLUTION DENTAL
COMMUNITY
Start: 2020-07-18 | End: 2021-02-16

## 2020-08-11 RX ORDER — ALBUTEROL SULFATE 90 UG/1
1-2 AEROSOL, METERED RESPIRATORY (INHALATION)
Status: CANCELLED
Start: 2020-09-01

## 2020-08-11 RX ORDER — SODIUM CHLORIDE 9 MG/ML
1000 INJECTION, SOLUTION INTRAVENOUS CONTINUOUS PRN
Status: CANCELLED
Start: 2020-09-01

## 2020-08-11 RX ORDER — METHYLPREDNISOLONE SODIUM SUCCINATE 125 MG/2ML
125 INJECTION, POWDER, LYOPHILIZED, FOR SOLUTION INTRAMUSCULAR; INTRAVENOUS
Status: CANCELLED
Start: 2020-09-01

## 2020-08-11 RX ORDER — EPINEPHRINE 0.3 MG/.3ML
0.3 INJECTION SUBCUTANEOUS EVERY 5 MIN PRN
Status: CANCELLED | OUTPATIENT
Start: 2020-09-01

## 2020-08-11 RX ORDER — HEPARIN SODIUM (PORCINE) LOCK FLUSH IV SOLN 100 UNIT/ML 100 UNIT/ML
5 SOLUTION INTRAVENOUS EVERY 8 HOURS
Status: DISCONTINUED | OUTPATIENT
Start: 2020-08-11 | End: 2020-08-11 | Stop reason: HOSPADM

## 2020-08-11 RX ADMIN — SODIUM CHLORIDE 250 ML: 9 INJECTION, SOLUTION INTRAVENOUS at 09:20

## 2020-08-11 RX ADMIN — TRASTUZUMAB 450 MG: 150 INJECTION, POWDER, LYOPHILIZED, FOR SOLUTION INTRAVENOUS at 09:20

## 2020-08-11 RX ADMIN — Medication 5 ML: at 09:54

## 2020-08-11 ASSESSMENT — PAIN SCALES - GENERAL: PAINLEVEL: NO PAIN (0)

## 2020-08-11 NOTE — PROGRESS NOTES
Infusion Nursing Note:  Hoda Brush presents today for Cycle 50 Herceptin.    Patient seen by provider today: Yes: Dr. Aguiar   present during visit today: Yes, Language: Stateless.     Note: Patient only receiving Herceptin today, no xgeva as she is having a dental consult.    Intravenous Access:  Implanted Port.    Treatment Conditions:  Lab Results   Component Value Date    HGB 12.7 08/10/2020     Lab Results   Component Value Date    WBC 6.9 08/10/2020      Lab Results   Component Value Date    ANEU 3.6 08/10/2020     Lab Results   Component Value Date     08/10/2020      Lab Results   Component Value Date     08/10/2020                   Lab Results   Component Value Date    POTASSIUM 3.6 08/10/2020           No results found for: MAG         Lab Results   Component Value Date    CR 0.83 08/10/2020                   Lab Results   Component Value Date    TAYLER 8.8 08/10/2020                Lab Results   Component Value Date    BILITOTAL 0.3 08/10/2020           Lab Results   Component Value Date    ALBUMIN 3.4 08/10/2020                    Lab Results   Component Value Date    ALT 98 08/10/2020           Lab Results   Component Value Date    AST 56 08/10/2020       Results reviewed, labs MET treatment parameters, ok to proceed with treatment.  ECHO/MUGA completed 6/26/2020  EF 60-65%.      Post Infusion Assessment:  Patient tolerated infusion without incident.  Blood return noted pre and post infusion.  Access discontinued per protocol.       Discharge Plan:   Patient declined prescription refills.  AVS to patient via [employer].  Patient will return 9/1 for next appointment.   Patient discharged in stable condition accompanied by: self.  Departure Mode: Ambulatory.    Caren Wheeler RN

## 2020-08-11 NOTE — NURSING NOTE
"Oncology Rooming Note    August 11, 2020 7:57 AM   Hoda Brush is a 65 year old female who presents for:    Chief Complaint   Patient presents with     Oncology Clinic Visit     [facility]     Initial Vitals: /81   Pulse 90   Temp 97.9  F (36.6  C)   Wt 82.1 kg (181 lb)   SpO2 97%   BMI 32.07 kg/m   Estimated body mass index is 32.07 kg/m  as calculated from the following:    Height as of 7/20/20: 1.6 m (5' 2.99\").    Weight as of this encounter: 82.1 kg (181 lb). Body surface area is 1.91 meters squared.  No Pain (0) Comment: Data Unavailable   No LMP recorded. Patient is postmenopausal.  Allergies reviewed: Yes  Medications reviewed: Yes    Medications: Medication refills not needed today.  Pharmacy name entered into [facility]: [id number] - [place], [place] - [address] AT  [address] [facility] [facility] [address] [facility]    Clinical concerns: No new concerns Dr. Aguiar was NOT notified.      Perez Diaz MA            "

## 2020-08-11 NOTE — LETTER
8/11/2020         RE: Hoda Brush  [address]  [place] [address]        Dear Colleague,    Thank you for referring your patient, Hoda Brush, to the [facility]. Please see a copy of my visit note below.    Hoda is a patient from [facility] and is seen here for continuation of care for her metastatic ER+HER2- breast cancer.  She is a [employer] refugee from [facility] and was seen in clinic with her daughter.  The only data we have from [facility] is an English translation of her records.       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  On 02/11/2014, she was seen by an oncologist.  The clinical staging of T4b N2 M1 was noted.   Her breast cancer was on the left side. There was no right breast cancer, confirmed by the patient and her daughter, correcting a possible error in the translated records.  ER was positive in 100% of the cells, NV at 14% and HER2 was 3+ positive.  It appears that this histopathologic information is on the mastectomy specimen. She underwent an MRI for staging of presumptive bone metastases which was performed 03/02/2014.  There were skeletal metastases in the thoracic vertebrae at 12, L1, L3, L5 and S1 vertebral body, ranging in size from 0.7-3.0 cm in size.  Ischial and right femur were also involved, as well as a large iliac mass measuring about 15 cm in the uterus. 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   Radiation Oncology consultation was performed 03/11/2014, and she was given radiation in 1 dose of 6 Gy to the right iliac lesion.        With the initial diagnosis, she initiated treatment with 6 cycles of CAF neoadjuvant chemotherapy 02/14, 07/07, 03/28, 04/18/14, 05/08 and 05/29/14. She also received monthly zoledronic acid.  She then underwent a modified left mastectomy of Palacios type and left lymphadenoectomy on 06/27/2014.   Pathologic  examination showed number at [facility] was [id number] showed infiltrative grade 2 ductal cancer with 6 level 1 metastatic lympFollow up with Jossie for visits and trastuzumab on 2-5, 2-26, 3-19 with CBC, CMP.  Echocardiogram on 3-19.  Follow up with me 4-9 with CBC, CMP, CA27.29 and CEA and with CT CAP on 4-8.h nodes and 3 level 2 metastatic lymph nodes.  The differentiation was intermediate.  The tumor was ER positive 70% of the cells, ND positive in 40% of the cells and HER2 was 3+ by immunohistochemistry and the Ki-67 labeling index was 20%. Her staging after surgery was stage IV, pT4b N2 M1.  I don't see a biopsy of the skeletal metastases.  She then had continuation with chemotherapy with 4 cycles of Herceptin and taxane with monthly zoledronic acid.  Tamoxifen was initiated.  She then had two years of Herceptin and a decision was made not to continue further Herceptin.  She continued on zoledronic acid every 3 months. She was clinically stable. [place] then [place] the [place] new refugee from [facility].  She has now been changed to letrozole.  Denosumab has now been held for new diagnosis of osteonecrosis of the R maxilla.     History of cholecystectomy 2020.      TREATMENT HISTORY:  A. Initial diagnosis with metastatic breast cancer in [facility]  Neoadjuvant CAF x 6.   B. Left mastectomy. Left axillary node dissection.  C.  Radiation in 1 dose to R iliac region.  C. Herceptin for 2 years taxane for a prescribed course then stopped, monthly zoledronic acid.  She had 2 years of Herceptin with tamoxifen added after chemotherapy.  D.  Tamoxifen alone and zoledronic acid every 3 months.  E.  [place]  We restarted Herceptin every 3 weeks and continued tamoxifen. Bone targeted agent changed to denosumab every 9 weeks.  F.  Xgeva discontinued 12-17-19 because of dental issues.       INTERVAL HISTORY  She has been doing generally well.  She has some low back pain which is chronic.  No fatigue, no  depression, no diarrhea.  She went to the dental school and has a right maxillary area of possible osteonecrosis.  She is off Xgeva because of this.  She saw a dentist 3 weeks ago.  She would like to see a staff dentist.     REVIEW OF SYSTEMS:  She denies fevers or chills, cough, chest pain, shortness of breath, nausea or vomiting, constipation, diarrhea, bone pain, or headache.  She does have a chronic low back pain.  A 10 point review of systems is negative.    PHYSICAL EXAM:  /81   Pulse 90   Temp 97.9  F (36.6  C)   Wt 82.1 kg (181 lb)   SpO2 97%   BMI 32.07 kg/m    -- General: no acute distress; well-developed and well-nourished   -- Lymph: no lymphadenopathy in the cervical, submandibular, supraclavicular, axillary, or inguinal areas  -- Cardiovascular: regular rate and rhythm, no murmurs; no peripheral edema or JVD  -- Pulmonary: lungs clear bilaterally, equal diaphragmatic excursion, no wheezes or crackles  -- Gastrointestinal: abdomen soft, non-tender, non-distended; bowel sounds present; no organomegaly  -- Musculoskeletal: no swelling or erythema of joints  -- Integumentary: no rashes  -- Neurologic: alert and oriented to self, place, time; no gross abnormalities  -- Psychiatric: appropriate affect, cooperative       Labs  WBC 6.9, Hb 12.7, Plt 228  Electrolytes, renal function wnl.    ALT 98, AST 56  Imaging  CT CAP on 8-10-20    IMPRESSION:  1.  Stable extensive osseous metastatic disease without evidence of  new lesions.  2.  Stable pulmonary nodules measuring up to 6 mm in the left lower  lobe. Recommend continued surveillance.      LAURA GOODWIN MD        ASSESSMENT AND PLAN:     1.  Hoda Brush is a 65-year-old woman with a history of ER-positive, NE-positive, HER2 positive breast cancer.  She is from [facility], formally from [facility], and came [place] live in the [place].  She is here for continuation of every 3-week Herceptin, which she has bee no.  N receiving  along with tamoxifen daily.  The tumor is ER positive, WV positive and HER2 positive.  She had metastatic disease at the time of presentation with bone-only metastases by report.  She underwent neoadjuvant CAF, had a left mastectomy, left axillary lymph node dissection, radiation to the right hip, which included the right iliac region where she had metastatic disease.  She was initially on tamoxifen but then was switched to letrozole.  She continues on this and every 3-week trastuzumab.  She has had no evidence of disease progression for the last 3 years.  We have continued Herceptin every 3 weeks as well as daily letrozole. CT CAP on 8/10/20 showed stable disease.   2.  Restaging. We reviewed imaging with her and she is pleased that there is no evidence of progression of her metastatic breast cancer. --Continue to tolerate treatment well. - Tumor markers  have been stable.    3.  Mild transaminase elevations.  Restaging showed no evidence of liver metastases.  These LFT elevations may be due to fatty liver.  4.  Continue the letrozole.   5.  Echo. Stable EF. --Echo in 4/2020 showed normal EF without change 55 to 60%.  6.  Discussion of bone health and right maxillary osteonecrosis.  She will continue with calcium and vitamin D.  Restart Xgeva in 3 weeks for osteoporosis of the right mandible has resolved. Dental consult at dental school.  On calcium + vitamin D.    7. Follow up.  Oral surgery consult this week. Follow up with Herceptin 9-1 CBC, CMP no provider. Follow up 9-22 with KARISSA phone visit CBC, CMP, CA27.29 and CEA with Herceptin, Xgeva. Follow up 10-13 with Herceptin, CBC, CMP no provider. Follow up 11-3 with CBC, CMP, CA27.29, CEA and Herceptin, Xgeva, echocardiogram.         Thank you for allowing us to participate in this patient's care.      Sincerely,      Lisandro Aguiar MD    [facility]  [phone].       Patient was interviewed and discussed with Dr. Aguiar     I  spent 15 minutes with the patient more than 50% of which was in counseling and coordination of care.     Again, thank you for allowing me to participate in the care of your patient.        Sincerely,        Lisandro Aguiar MD

## 2020-09-01 ENCOUNTER — TELEPHONE (OUTPATIENT)
Dept: ONCOLOGY | Facility: CLINIC | Age: 65
End: 2020-09-01

## 2020-09-01 ENCOUNTER — INFUSION THERAPY VISIT (OUTPATIENT)
Dept: ONCOLOGY | Facility: CLINIC | Age: 65
End: 2020-09-01
Attending: INTERNAL MEDICINE
Payer: COMMERCIAL

## 2020-09-01 VITALS
BODY MASS INDEX: 32.32 KG/M2 | OXYGEN SATURATION: 97 % | RESPIRATION RATE: 16 BRPM | SYSTOLIC BLOOD PRESSURE: 123 MMHG | HEART RATE: 88 BPM | TEMPERATURE: 98.1 F | WEIGHT: 182.4 LBS | DIASTOLIC BLOOD PRESSURE: 77 MMHG

## 2020-09-01 DIAGNOSIS — C50.912 MALIGNANT NEOPLASM OF LEFT FEMALE BREAST, UNSPECIFIED ESTROGEN RECEPTOR STATUS, UNSPECIFIED SITE OF BREAST (H): Primary | ICD-10-CM

## 2020-09-01 LAB
ALBUMIN SERPL-MCNC: 3.2 G/DL (ref 3.4–5)
ALP SERPL-CCNC: 67 U/L (ref 40–150)
ALT SERPL W P-5'-P-CCNC: 74 U/L (ref 0–50)
ANION GAP SERPL CALCULATED.3IONS-SCNC: 5 MMOL/L (ref 3–14)
AST SERPL W P-5'-P-CCNC: 44 U/L (ref 0–45)
BASOPHILS # BLD AUTO: 0 10E9/L (ref 0–0.2)
BASOPHILS NFR BLD AUTO: 0.4 %
BILIRUB SERPL-MCNC: 0.3 MG/DL (ref 0.2–1.3)
BUN SERPL-MCNC: 13 MG/DL (ref 7–30)
CALCIUM SERPL-MCNC: 8.7 MG/DL (ref 8.5–10.1)
CANCER AG27-29 SERPL-ACNC: 8 U/ML (ref 0–39)
CEA SERPL-MCNC: <0.5 UG/L (ref 0–2.5)
CHLORIDE SERPL-SCNC: 108 MMOL/L (ref 94–109)
CO2 SERPL-SCNC: 29 MMOL/L (ref 20–32)
CREAT SERPL-MCNC: 0.78 MG/DL (ref 0.52–1.04)
DIFFERENTIAL METHOD BLD: NORMAL
EOSINOPHIL # BLD AUTO: 0.3 10E9/L (ref 0–0.7)
EOSINOPHIL NFR BLD AUTO: 4.4 %
ERYTHROCYTE [DISTWIDTH] IN BLOOD BY AUTOMATED COUNT: 13.4 % (ref 10–15)
GFR SERPL CREATININE-BSD FRML MDRD: 80 ML/MIN/{1.73_M2}
GLUCOSE SERPL-MCNC: 107 MG/DL (ref 70–99)
HCT VFR BLD AUTO: 36.8 % (ref 35–47)
HGB BLD-MCNC: 11.8 G/DL (ref 11.7–15.7)
IMM GRANULOCYTES # BLD: 0 10E9/L (ref 0–0.4)
IMM GRANULOCYTES NFR BLD: 0.2 %
LYMPHOCYTES # BLD AUTO: 2.2 10E9/L (ref 0.8–5.3)
LYMPHOCYTES NFR BLD AUTO: 38.8 %
MCH RBC QN AUTO: 29.9 PG (ref 26.5–33)
MCHC RBC AUTO-ENTMCNC: 32.1 G/DL (ref 31.5–36.5)
MCV RBC AUTO: 93 FL (ref 78–100)
MONOCYTES # BLD AUTO: 0.4 10E9/L (ref 0–1.3)
MONOCYTES NFR BLD AUTO: 7.5 %
NEUTROPHILS # BLD AUTO: 2.7 10E9/L (ref 1.6–8.3)
NEUTROPHILS NFR BLD AUTO: 48.7 %
NRBC # BLD AUTO: 0 10*3/UL
NRBC BLD AUTO-RTO: 0 /100
PLATELET # BLD AUTO: 205 10E9/L (ref 150–450)
POTASSIUM SERPL-SCNC: 3.7 MMOL/L (ref 3.4–5.3)
PROT SERPL-MCNC: 7.5 G/DL (ref 6.8–8.8)
RBC # BLD AUTO: 3.94 10E12/L (ref 3.8–5.2)
SODIUM SERPL-SCNC: 142 MMOL/L (ref 133–144)
WBC # BLD AUTO: 5.6 10E9/L (ref 4–11)

## 2020-09-01 PROCEDURE — 25000128 H RX IP 250 OP 636: Mod: ZF | Performed by: INTERNAL MEDICINE

## 2020-09-01 PROCEDURE — 85025 COMPLETE CBC W/AUTO DIFF WBC: CPT | Performed by: INTERNAL MEDICINE

## 2020-09-01 PROCEDURE — 82378 CARCINOEMBRYONIC ANTIGEN: CPT | Performed by: INTERNAL MEDICINE

## 2020-09-01 PROCEDURE — 80053 COMPREHEN METABOLIC PANEL: CPT | Performed by: INTERNAL MEDICINE

## 2020-09-01 PROCEDURE — 25800030 ZZH RX IP 258 OP 636: Mod: ZF | Performed by: INTERNAL MEDICINE

## 2020-09-01 PROCEDURE — 86300 IMMUNOASSAY TUMOR CA 15-3: CPT | Performed by: INTERNAL MEDICINE

## 2020-09-01 PROCEDURE — 96413 CHEMO IV INFUSION 1 HR: CPT

## 2020-09-01 RX ORDER — HEPARIN SODIUM (PORCINE) LOCK FLUSH IV SOLN 100 UNIT/ML 100 UNIT/ML
5 SOLUTION INTRAVENOUS EVERY 8 HOURS
Status: DISCONTINUED | OUTPATIENT
Start: 2020-09-01 | End: 2020-09-01 | Stop reason: HOSPADM

## 2020-09-01 RX ADMIN — TRASTUZUMAB 450 MG: 150 INJECTION, POWDER, LYOPHILIZED, FOR SOLUTION INTRAVENOUS at 08:50

## 2020-09-01 RX ADMIN — Medication 5 ML: at 09:24

## 2020-09-01 RX ADMIN — Medication 5 ML: at 07:45

## 2020-09-01 RX ADMIN — Medication 5 ML: at 08:53

## 2020-09-01 ASSESSMENT — PAIN SCALES - GENERAL: PAINLEVEL: NO PAIN (0)

## 2020-09-01 NOTE — TELEPHONE ENCOUNTER
Hoda was reluctant to have Xgeva until she has clearance from her dentist.  She has an appointment at the dental school this coming week.  I think it would be reasonable to add Xgeva on her next visit in 3 weeks as long as there is no contraindication from dentistry I think her dental problems have resolved.    Lisandro Aguiar MD

## 2020-09-01 NOTE — PROGRESS NOTES
Infusion Nursing Note:  Hoda Brush presents today for C51 Herceptin.    Patient seen by provider today: No   present during visit today: Yes, Language: Cambodian.     Note: Pt feeling well today , no new issues.  Denies s/s of infection.  Pt has been having dental issues, per Dr Aguiar's last note Xgeva was to be administered today.  Pt reporting she is going to the dentist next week.    TORB 9/1/20 @0815 Dr Aguiar-Susu Seay RN  --Defer Xgeva until 9/22, SEMAJ Frost will determine if Xgeva ok at that time    Intravenous Access:  Implanted Port.    Treatment Conditions:  Lab Results   Component Value Date    HGB 11.8 09/01/2020     Lab Results   Component Value Date    WBC 5.6 09/01/2020      Lab Results   Component Value Date    ANEU 2.7 09/01/2020     Lab Results   Component Value Date     09/01/2020      Lab Results   Component Value Date     09/01/2020                   Lab Results   Component Value Date    POTASSIUM 3.7 09/01/2020           No results found for: MAG         Lab Results   Component Value Date    CR 0.78 09/01/2020                   Lab Results   Component Value Date    TAYLER 8.7 09/01/2020                Lab Results   Component Value Date    BILITOTAL 0.3 09/01/2020           Lab Results   Component Value Date    ALBUMIN 3.2 09/01/2020                    Lab Results   Component Value Date    ALT 74 09/01/2020           Lab Results   Component Value Date    AST 44 09/01/2020       Results reviewed, labs MET treatment parameters, ok to proceed with treatment.  ECHO/MUGA completed 8/10  EF 55-60%.      Post Infusion Assessment:  Patient tolerated infusion without incident.  Blood return noted pre and post infusion.  Site patent and intact, free from redness, edema or discomfort.  No evidence of extravasations.  Access discontinued per protocol.       Discharge Plan:   Patient declined prescription refills.  Discharge instructions reviewed with:  Patient.  Patient and/or family verbalized understanding of discharge instructions and all questions answered.  AVS to patient via [employer].  Patient will return 9/22 for next appointment.   Patient discharged in stable condition accompanied by: self.  Departure Mode: Ambulatory.  IB sent to Caryn re: Xgeva follow up on 9/22.    Susu Seay RN

## 2020-09-22 ENCOUNTER — APPOINTMENT (OUTPATIENT)
Dept: LAB | Facility: CLINIC | Age: 65
End: 2020-09-22
Attending: OBSTETRICS & GYNECOLOGY
Payer: COMMERCIAL

## 2020-09-22 ENCOUNTER — VIRTUAL VISIT (OUTPATIENT)
Dept: ONCOLOGY | Facility: CLINIC | Age: 65
End: 2020-09-22
Attending: PHYSICIAN ASSISTANT
Payer: COMMERCIAL

## 2020-09-22 VITALS
SYSTOLIC BLOOD PRESSURE: 120 MMHG | HEART RATE: 80 BPM | TEMPERATURE: 98.6 F | RESPIRATION RATE: 14 BRPM | WEIGHT: 181.3 LBS | BODY MASS INDEX: 32.13 KG/M2 | OXYGEN SATURATION: 96 % | DIASTOLIC BLOOD PRESSURE: 76 MMHG

## 2020-09-22 DIAGNOSIS — C79.51 BONE METASTASIS: Primary | ICD-10-CM

## 2020-09-22 DIAGNOSIS — C50.912 MALIGNANT NEOPLASM OF LEFT FEMALE BREAST, UNSPECIFIED ESTROGEN RECEPTOR STATUS, UNSPECIFIED SITE OF BREAST (H): Primary | ICD-10-CM

## 2020-09-22 DIAGNOSIS — C50.912 MALIGNANT NEOPLASM OF LEFT FEMALE BREAST, UNSPECIFIED ESTROGEN RECEPTOR STATUS, UNSPECIFIED SITE OF BREAST (H): ICD-10-CM

## 2020-09-22 LAB
ALBUMIN SERPL-MCNC: 3.3 G/DL (ref 3.4–5)
ALP SERPL-CCNC: 89 U/L (ref 40–150)
ALT SERPL W P-5'-P-CCNC: 98 U/L (ref 0–50)
ANION GAP SERPL CALCULATED.3IONS-SCNC: 6 MMOL/L (ref 3–14)
AST SERPL W P-5'-P-CCNC: 62 U/L (ref 0–45)
BASOPHILS # BLD AUTO: 0 10E9/L (ref 0–0.2)
BASOPHILS NFR BLD AUTO: 0.5 %
BILIRUB SERPL-MCNC: 0.3 MG/DL (ref 0.2–1.3)
BUN SERPL-MCNC: 12 MG/DL (ref 7–30)
CALCIUM SERPL-MCNC: 8.8 MG/DL (ref 8.5–10.1)
CANCER AG27-29 SERPL-ACNC: 18 U/ML (ref 0–39)
CEA SERPL-MCNC: <0.5 UG/L (ref 0–2.5)
CHLORIDE SERPL-SCNC: 102 MMOL/L (ref 94–109)
CO2 SERPL-SCNC: 29 MMOL/L (ref 20–32)
CREAT SERPL-MCNC: 0.83 MG/DL (ref 0.52–1.04)
DIFFERENTIAL METHOD BLD: ABNORMAL
EOSINOPHIL # BLD AUTO: 0.3 10E9/L (ref 0–0.7)
EOSINOPHIL NFR BLD AUTO: 4.3 %
ERYTHROCYTE [DISTWIDTH] IN BLOOD BY AUTOMATED COUNT: 13.6 % (ref 10–15)
GFR SERPL CREATININE-BSD FRML MDRD: 74 ML/MIN/{1.73_M2}
GLUCOSE SERPL-MCNC: 88 MG/DL (ref 70–99)
HCT VFR BLD AUTO: 39.9 % (ref 35–47)
HGB BLD-MCNC: 12.5 G/DL (ref 11.7–15.7)
IMM GRANULOCYTES # BLD: 0 10E9/L (ref 0–0.4)
IMM GRANULOCYTES NFR BLD: 0.2 %
LYMPHOCYTES # BLD AUTO: 2.3 10E9/L (ref 0.8–5.3)
LYMPHOCYTES NFR BLD AUTO: 37.6 %
MCH RBC QN AUTO: 29.7 PG (ref 26.5–33)
MCHC RBC AUTO-ENTMCNC: 31.3 G/DL (ref 31.5–36.5)
MCV RBC AUTO: 95 FL (ref 78–100)
MONOCYTES # BLD AUTO: 0.4 10E9/L (ref 0–1.3)
MONOCYTES NFR BLD AUTO: 6.9 %
NEUTROPHILS # BLD AUTO: 3.1 10E9/L (ref 1.6–8.3)
NEUTROPHILS NFR BLD AUTO: 50.5 %
NRBC # BLD AUTO: 0 10*3/UL
NRBC BLD AUTO-RTO: 0 /100
PLATELET # BLD AUTO: 228 10E9/L (ref 150–450)
POTASSIUM SERPL-SCNC: 4 MMOL/L (ref 3.4–5.3)
PROT SERPL-MCNC: 7.8 G/DL (ref 6.8–8.8)
RBC # BLD AUTO: 4.21 10E12/L (ref 3.8–5.2)
SODIUM SERPL-SCNC: 137 MMOL/L (ref 133–144)
WBC # BLD AUTO: 6.2 10E9/L (ref 4–11)

## 2020-09-22 PROCEDURE — 25800030 ZZH RX IP 258 OP 636: Mod: ZF | Performed by: PHYSICIAN ASSISTANT

## 2020-09-22 PROCEDURE — 86300 IMMUNOASSAY TUMOR CA 15-3: CPT | Performed by: INTERNAL MEDICINE

## 2020-09-22 PROCEDURE — 85025 COMPLETE CBC W/AUTO DIFF WBC: CPT | Performed by: INTERNAL MEDICINE

## 2020-09-22 PROCEDURE — 90682 RIV4 VACC RECOMBINANT DNA IM: CPT | Mod: ZF | Performed by: PHYSICIAN ASSISTANT

## 2020-09-22 PROCEDURE — 99213 OFFICE O/P EST LOW 20 MIN: CPT | Mod: 95 | Performed by: PHYSICIAN ASSISTANT

## 2020-09-22 PROCEDURE — G0008 ADMIN INFLUENZA VIRUS VAC: HCPCS

## 2020-09-22 PROCEDURE — 25000128 H RX IP 250 OP 636: Mod: ZF | Performed by: PHYSICIAN ASSISTANT

## 2020-09-22 PROCEDURE — 80053 COMPREHEN METABOLIC PANEL: CPT | Performed by: INTERNAL MEDICINE

## 2020-09-22 PROCEDURE — 82378 CARCINOEMBRYONIC ANTIGEN: CPT | Performed by: INTERNAL MEDICINE

## 2020-09-22 PROCEDURE — 96413 CHEMO IV INFUSION 1 HR: CPT

## 2020-09-22 RX ORDER — MEPERIDINE HYDROCHLORIDE 25 MG/ML
25 INJECTION INTRAMUSCULAR; INTRAVENOUS; SUBCUTANEOUS EVERY 30 MIN PRN
Status: CANCELLED | OUTPATIENT
Start: 2020-09-22

## 2020-09-22 RX ORDER — SODIUM CHLORIDE 9 MG/ML
1000 INJECTION, SOLUTION INTRAVENOUS CONTINUOUS PRN
Status: CANCELLED
Start: 2020-10-13

## 2020-09-22 RX ORDER — MEPERIDINE HYDROCHLORIDE 25 MG/ML
25 INJECTION INTRAMUSCULAR; INTRAVENOUS; SUBCUTANEOUS EVERY 30 MIN PRN
Status: CANCELLED | OUTPATIENT
Start: 2020-10-13

## 2020-09-22 RX ORDER — HEPARIN SODIUM (PORCINE) LOCK FLUSH IV SOLN 100 UNIT/ML 100 UNIT/ML
5 SOLUTION INTRAVENOUS EVERY 8 HOURS
Status: CANCELLED | OUTPATIENT
Start: 2020-10-13

## 2020-09-22 RX ORDER — LORAZEPAM 2 MG/ML
0.5 INJECTION INTRAMUSCULAR EVERY 4 HOURS PRN
Status: CANCELLED
Start: 2020-09-22

## 2020-09-22 RX ORDER — HEPARIN SODIUM (PORCINE) LOCK FLUSH IV SOLN 100 UNIT/ML 100 UNIT/ML
5 SOLUTION INTRAVENOUS DAILY PRN
Status: DISCONTINUED | OUTPATIENT
Start: 2020-09-22 | End: 2020-09-22 | Stop reason: HOSPADM

## 2020-09-22 RX ORDER — DIPHENHYDRAMINE HYDROCHLORIDE 50 MG/ML
50 INJECTION INTRAMUSCULAR; INTRAVENOUS
Status: CANCELLED
Start: 2020-09-22

## 2020-09-22 RX ORDER — EPINEPHRINE 1 MG/ML
0.3 INJECTION, SOLUTION INTRAMUSCULAR; SUBCUTANEOUS EVERY 5 MIN PRN
Status: CANCELLED | OUTPATIENT
Start: 2020-10-13

## 2020-09-22 RX ORDER — ALBUTEROL SULFATE 90 UG/1
1-2 AEROSOL, METERED RESPIRATORY (INHALATION)
Status: CANCELLED
Start: 2020-09-22

## 2020-09-22 RX ORDER — DIPHENHYDRAMINE HYDROCHLORIDE 50 MG/ML
50 INJECTION INTRAMUSCULAR; INTRAVENOUS
Status: CANCELLED
Start: 2020-10-13

## 2020-09-22 RX ORDER — METHYLPREDNISOLONE SODIUM SUCCINATE 125 MG/2ML
125 INJECTION, POWDER, LYOPHILIZED, FOR SOLUTION INTRAMUSCULAR; INTRAVENOUS
Status: CANCELLED
Start: 2020-09-22

## 2020-09-22 RX ORDER — METHYLPREDNISOLONE SODIUM SUCCINATE 125 MG/2ML
125 INJECTION, POWDER, LYOPHILIZED, FOR SOLUTION INTRAMUSCULAR; INTRAVENOUS
Status: CANCELLED
Start: 2020-10-13

## 2020-09-22 RX ORDER — EPINEPHRINE 1 MG/ML
0.3 INJECTION, SOLUTION INTRAMUSCULAR; SUBCUTANEOUS EVERY 5 MIN PRN
Status: CANCELLED | OUTPATIENT
Start: 2020-09-22

## 2020-09-22 RX ORDER — ACETAMINOPHEN 325 MG/1
650 TABLET ORAL
Status: CANCELLED | OUTPATIENT
Start: 2020-09-22

## 2020-09-22 RX ORDER — DIPHENHYDRAMINE HCL 25 MG
50 CAPSULE ORAL ONCE
Status: CANCELLED
Start: 2020-09-22

## 2020-09-22 RX ORDER — EPINEPHRINE 0.3 MG/.3ML
0.3 INJECTION SUBCUTANEOUS EVERY 5 MIN PRN
Status: CANCELLED | OUTPATIENT
Start: 2020-09-22

## 2020-09-22 RX ORDER — EPINEPHRINE 0.3 MG/.3ML
0.3 INJECTION SUBCUTANEOUS EVERY 5 MIN PRN
Status: CANCELLED | OUTPATIENT
Start: 2020-10-13

## 2020-09-22 RX ORDER — ACETAMINOPHEN 325 MG/1
650 TABLET ORAL
Status: CANCELLED | OUTPATIENT
Start: 2020-10-13

## 2020-09-22 RX ORDER — SODIUM CHLORIDE 9 MG/ML
1000 INJECTION, SOLUTION INTRAVENOUS CONTINUOUS PRN
Status: CANCELLED
Start: 2020-09-22

## 2020-09-22 RX ORDER — ALBUTEROL SULFATE 0.83 MG/ML
2.5 SOLUTION RESPIRATORY (INHALATION)
Status: CANCELLED | OUTPATIENT
Start: 2020-10-13

## 2020-09-22 RX ORDER — HEPARIN SODIUM (PORCINE) LOCK FLUSH IV SOLN 100 UNIT/ML 100 UNIT/ML
5 SOLUTION INTRAVENOUS EVERY 8 HOURS
Status: CANCELLED | OUTPATIENT
Start: 2020-09-22

## 2020-09-22 RX ORDER — LORAZEPAM 2 MG/ML
0.5 INJECTION INTRAMUSCULAR EVERY 4 HOURS PRN
Status: CANCELLED
Start: 2020-10-13

## 2020-09-22 RX ORDER — ALBUTEROL SULFATE 90 UG/1
1-2 AEROSOL, METERED RESPIRATORY (INHALATION)
Status: CANCELLED
Start: 2020-10-13

## 2020-09-22 RX ORDER — HEPARIN SODIUM (PORCINE) LOCK FLUSH IV SOLN 100 UNIT/ML 100 UNIT/ML
5 SOLUTION INTRAVENOUS EVERY 8 HOURS
Status: DISCONTINUED | OUTPATIENT
Start: 2020-09-22 | End: 2020-09-22 | Stop reason: HOSPADM

## 2020-09-22 RX ORDER — ALBUTEROL SULFATE 0.83 MG/ML
2.5 SOLUTION RESPIRATORY (INHALATION)
Status: CANCELLED | OUTPATIENT
Start: 2020-09-22

## 2020-09-22 RX ORDER — DIPHENHYDRAMINE HCL 25 MG
50 CAPSULE ORAL ONCE
Status: CANCELLED
Start: 2020-10-13

## 2020-09-22 RX ADMIN — INFLUENZA A VIRUS A/HAWAII/70/2019 (H1N1) RECOMBINANT HEMAGGLUTININ ANTIGEN, INFLUENZA A VIRUS A/MINNESOTA/41/2019 (H3N2) RECOMBINANT HEMAGGLUTININ ANTIGEN, INFLUENZA B VIRUS B/WASHINGTON/02/2019 RECOMBINANT HEMAGGLUTININ ANTIGEN, AND INFLUENZA B VIRUS B/PHUKET/3073/2013 RECOMBINANT HEMAGGLUTININ ANTIGEN 0.5 ML: 45; 45; 45; 45 INJECTION INTRAMUSCULAR at 10:45

## 2020-09-22 RX ADMIN — TRASTUZUMAB 496 MG: 150 INJECTION, POWDER, LYOPHILIZED, FOR SOLUTION INTRAVENOUS at 10:41

## 2020-09-22 RX ADMIN — Medication 5 ML: at 09:43

## 2020-09-22 RX ADMIN — SODIUM CHLORIDE 250 ML: 9 INJECTION, SOLUTION INTRAVENOUS at 10:41

## 2020-09-22 RX ADMIN — Medication 5 ML: at 11:16

## 2020-09-22 ASSESSMENT — PAIN SCALES - GENERAL: PAINLEVEL: NO PAIN (0)

## 2020-09-22 NOTE — PROGRESS NOTES
"Hoda Brush is a 65 year old female who is being evaluated via a billable telephone visit.      The patient has been notified of following:     \"This telephone visit will be conducted via a call between you and your physician/provider. We have found that certain health care needs can be provided without the need for a physical exam.  This service lets us provide the care you need with a short phone conversation.  If a prescription is necessary we can send it directly to your pharmacy.  If lab work is needed we can place an order for that and you can then stop by our lab to have the test done at a later time.    Telephone visits are billed at different rates depending on your insurance coverage. During this emergency period, for some insurers they may be billed the same as an in-person visit.  Please reach out to your insurance provider with any questions.    If during the course of the call the physician/provider feels a telephone visit is not appropriate, you will not be charged for this service.\"    Patient has given verbal consent for Telephone visit?  Yes    What phone number would you like to be contacted at? [phone]    How would you like to obtain your AVS? JOHAN Gregg    Phone call duration: *** minutes    {signature options:[id number]}        History of Present Illness: Hoda Brush is a 65 year old female with ER positive, HER2 positive metastatic left breast cancer (bone).     TREATMENT HISTORY:  A. Initial diagnosis with metastatic breast cancer in [facility].  Neoadjuvant CAF x 6.   B. Left mastectomy. Left axillary node dissection.  C.  Radiation in 1 dose to R iliac region. g Gy.  C. Herceptin for 2 years taxane for a prescribed course then stopped, monthly zoledronic acid.  She had 2 years of Herceptin with tamoxifen added after chemotherapy.  D.  Tamoxifen alone and zoledronic acid every 3 months.  E[place]  [place]  We restarted Herceptin every 3 weeks and " continued tamoxifen. Bone targeted agent changed to denosumab every 6 weeks.   F. Tamoxifen changed to letrozole 10/8/19.      She is from [facility], formerly from [facility], and [place] the [place]  She lives with her daughter and is here for continuation of every 3 week Herceptin, which she receives along with tamoxifen.  Her tumor is ER positive, NM positive, HER-2 positive.  She had metastatic disease at the time of presentation with bone-only metastases by report.  She presented with metastatic disease in 02/2014.  Staging was initially bone-only metastases by report.  She did her staging in 02/2014 that showed that she had stage IV disease, T4N2M1 invasive ductal carcinoma of the left breast.  She had a right hip metastasis.  She underwent neoadjuvant CAF, left mastectomy, left axillary lymph node dissection and radiation.  She had radiation of the right iliac region where she had metastatic disease.  Pathology report from [facility] shows the tumor to be positive for ER in 75% of the cells, positive for NM in 40% of the cells, and the HER-2 was 3+ positive by immunohistochemistry.  The Ki-67 was 20%.  She had no evidence of nonbone metastatic disease on her PET/CT scan from 08/2017.     Had acute abdominal pain and N/V and found to have cholecystitis in the ER. Had cholecystectomy on 8/31. Herceptin resumed on 9/13/19. Tamoxifen changed to letrozole on 10/8/19. Last restaging on 5/18/20 was stable.     Interval History:   On the phone with Ecuadorean .     ***    PHYSICAL EXAM:      Objective:  General: patient sounds in no audible acute distress, alert and oriented, speech clear and fluid  Resp: Speaking in full sentences, no audible respiratory distress, no cough, no audible wheeze  Psych: able to articulate logical thoughts, able to abstract reason, no tangential thoughts, no hallucinations or delusions  His affect is normal    Labs:           Assessment and Plan:      1. Metastatic breast cancer, ER,  AK, HER2+ positive:   Was last treated in [facility] with Herceptin. We have continued Herceptin every 3 weeks as well as daily letrozole. CT CAP on 5/18/20 with stable disease.   -Continues to tolerate treatment very well with minimal SEs  --Echo from showed 8/10 showed normal EF without change from previous study. Continue to repeat every 3 months while on Herceptin. (next in Nov)  -CT from 8/10/29 showed stable disease  - Tumor markers ***  -will continue Herceptin today.   -since she has been doing so well, provider appt with every other infusion. Will follow-up with Dr. Aguiar in 6 weeks with new imaging      2. Bone metastasis: Has previously been on xgeva every 6 weeks. On calcium + vitamin D.  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 Note from oral surgery was scanned in chart. Plan to restart xgeva today? ***     3. Transaminitis: Mild. No hepatic lesions noted on CT from May. Has been noted previously in the past. Likely from medications. Will continue to monitor for now. If they were to continue to increase, would get US of liver.    4. GERD: Continue omeprazole daily. Symptoms much improved    Phone call duration: *** minutes    Caryn Wheeler PA-C

## 2020-09-22 NOTE — PATIENT INSTRUCTIONS
[facility] Triage and after hours / weekends / holidays:  [phone]    Please call the triage or after hours line if you experience a temperature greater than or equal to 100.5, shaking chills, have uncontrolled nausea, vomiting and/or diarrhea, dizziness, shortness of breath, chest pain, bleeding, unexplained bruising, or if you have any other new/concerning symptoms, questions or concerns.      If you are having any concerning symptoms or wish to speak to a provider before your next infusion visit, please call your care coordinator or triage to notify them so we can adequately serve you.     If you need a refill on a narcotic prescription or other medication, please call before your infusion appointment.                 September 2020 Sunday Monday Tuesday Wednesday Thursday Friday Saturday             1    [facility]   7:30 AM   (60 min.)    [facility]   [facility]    [facility] ONC INFUSION 60   8:00 AM   (60 min.)    ONCOLOGY INFUSION   [facility] 2     3     4     5       6     7     8     9     10     11     12       13     14     15     16     17     18     19       20     21     22    TELEPHONE VISIT RETURN   8:20 AM   (90 min.)   Caryn Wheeler PA-C   [facility]   9:15 AM   (60 min.)    [facility] D[facility]   [facility] Lab Draw    [facility] ONC INFUSION 60   9:30 AM   (90 min.)    ONCOLOGY INFUSION   [facility] 23     24     25     26       27     28     29     30                                October 2020 Sunday Monday Tuesday Wednesday Thursday Friday Saturday                       1     2     3       4     5     6     7     8     9     10       11     12     13    [facility]   8:15 AM   (15 min.)    [facility]   [facility]    [facility] ON[facility] INFUSION 60   9:00 AM   (60 min.)    ONCOLOGY INFUSION   [facility] 14     15     16      17       18     19     20     21     22     23     24       25     26     27     28     29     30     31                    Recent Results (from the past 24 hour(s))   CBC with platelets differential    Collection Time: 09/22/20  9:37 AM   Result Value Ref Range    WBC 6.2 4.0 - 11.0 10e9/L    RBC Count 4.21 3.8 - 5.2 10e12/L    Hemoglobin 12.5 11.7 - 15.7 g/dL    Hematocrit 39.9 35.0 - 47.0 %    MCV 95 78 - 100 fl    MCH 29.7 26.5 - 33.0 pg    MCHC 31.3 (L) 31.5 - 36.5 g/dL    RDW 13.6 10.0 - 15.0 %    Platelet Count 228 150 - 450 10e9/L    Diff Method Automated Method     % Neutrophils 50.5 %    % Lymphocytes 37.6 %    % Monocytes 6.9 %    % Eosinophils 4.3 %    % Basophils 0.5 %    % Immature Granulocytes 0.2 %    Nucleated RBCs 0 0 /100    Absolute Neutrophil 3.1 1.6 - 8.3 10e9/L    Absolute Lymphocytes 2.3 0.8 - 5.3 10e9/L    Absolute Monocytes 0.4 0.0 - 1.3 10e9/L    Absolute Eosinophils 0.3 0.0 - 0.7 10e9/L    Absolute Basophils 0.0 0.0 - 0.2 10e9/L    Abs Immature Granulocytes 0.0 0 - 0.4 10e9/L    Absolute Nucleated RBC 0.0

## 2020-09-22 NOTE — PROGRESS NOTES
Infusion Nursing Note:  Hoda Brush presents today for Cycle 52 Herceptin and Flu Vaccine.    Patient seen by provider today: Yes: SEMAJ Frost.   present during visit today: Yes, Language: Guyanese.     Note:    TORB 9/22/20 @ 0830 SEMAJ Frost / Beverly Hernandez RN  - Okay for chemotherapy today.  - Hold Xgeva today.    Intravenous Access:  Implanted Port.    Treatment Conditions:  Lab Results   Component Value Date    HGB 12.5 09/22/2020     Lab Results   Component Value Date    WBC 6.2 09/22/2020      Lab Results   Component Value Date    ANEU 3.1 09/22/2020     Lab Results   Component Value Date     09/22/2020      Results reviewed, labs MET treatment parameters, ok to proceed with treatment.  ECHO/MUGA completed 8/10/20  EF 55-60%.    Post Infusion Assessment:  Patient tolerated infusion without incident.  Patient tolerated injection without incident to the Right Deltoid.   Blood return noted pre and post infusion.  Site patent and intact, free from redness, edema or discomfort.  No evidence of extravasations.  Access discontinued per protocol.     Discharge Plan:   Patient declined prescription refills.  AVS to patient via [employer].  Patient will return 10/13 for next appointment.   Patient discharged in stable condition accompanied by: self.  Departure Mode: Ambulatory.  Face to Face time: 0.    Beverly Hernandez RN

## 2020-09-22 NOTE — PROGRESS NOTES
"Hoda Brush is a 65 year old female who is being evaluated via a billable telephone visit.      The patient has been notified of following:     \"This telephone visit will be conducted via a call between you and your physician/provider. We have found that certain health care needs can be provided without the need for a physical exam.  This service lets us provide the care you need with a short phone conversation.  If a prescription is necessary we can send it directly to your pharmacy.  If lab work is needed we can place an order for that and you can then stop by our lab to have the test done at a later time.    Telephone visits are billed at different rates depending on your insurance coverage. During this emergency period, for some insurers they may be billed the same as an in-person visit.  Please reach out to your insurance provider with any questions.    If during the course of the call the physician/provider feels a telephone visit is not appropriate, you will not be charged for this service.\"    Patient has given verbal consent for Telephone visit?  Yes    What phone number would you like to be contacted at? [phone]    How would you like to obtain your AVS? JOHAN Gregg        History of Present Illness: Hoda Brush is a 65 year old female with ER positive, HER2 positive metastatic left breast cancer (bone).     TREATMENT HISTORY:  A. Initial diagnosis with metastatic breast cancer in [facility].  Neoadjuvant CAF x 6.   B. Left mastectomy. Left axillary node dissection.  C.  Radiation in 1 dose to R iliac region. g Gy.  C. Herceptin for 2 years taxane for a prescribed course then stopped, monthly zoledronic acid.  She had 2 years of Herceptin with tamoxifen added after chemotherapy.  D.  Tamoxifen alone and zoledronic acid every 3 months.  E.  [place]  We restarted Herceptin every 3 weeks and continued tamoxifen. Bone targeted agent changed to denosumab every 6 " weeks.   F. Tamoxifen changed to letrozole 10/8/19.      She is from [facility], formerly from [facility], and came [place] the [place]  She lives with her daughter and is here for continuation of every 3 week Herceptin, which she receives along with tamoxifen.  Her tumor is ER positive, IL positive, HER-2 positive.  She had metastatic disease at the time of presentation with bone-only metastases by report.  She presented with metastatic disease in 02/2014.  Staging was initially bone-only metastases by report.  She did her staging in 02/2014 that showed that she had stage IV disease, T4N2M1 invasive ductal carcinoma of the left breast.  She had a right hip metastasis.  She underwent neoadjuvant CAF, left mastectomy, left axillary lymph node dissection and radiation.  She had radiation of the right iliac region where she had metastatic disease.  Pathology report from [facility] shows the tumor to be positive for ER in 75% of the cells, positive for IL in 40% of the cells, and the HER-2 was 3+ positive by immunohistochemistry.  The Ki-67 was 20%.  She had no evidence of nonbone metastatic disease on her PET/CT scan from 08/2017.     Had acute abdominal pain and N/V and found to have cholecystitis in the ER. Had cholecystectomy on 8/31. Herceptin resumed on 9/13/19. Tamoxifen changed to letrozole on 10/8/19. Last restaging on 5/18/20 was stable.     Interval History:   On the phone with Bermudian .     -Eating and drinking well  -No new pain   -No GI or respiratory symptoms   -Has chest pain about every 10 days. Improves with APAP. This is not new  -Denies f/c/ns  -No new lumps or bumps     ROS: 10 point ROS neg other than the symptoms noted above in the HPI.      PHYSICAL EXAM:    Objective:  General: patient sounds in no audible acute distress, alert and oriented, speech clear and fluid  Resp: Speaking in full sentences, no audible respiratory distress, no cough, no audible wheeze  Psych: able to articulate logical  thoughts, able to abstract reason, no tangential thoughts, no hallucinations or delusions  Her affect is normal    Labs:      9/22/2020 09:37   Sodium 137   Potassium 4.0   Chloride 102   Carbon Dioxide 29   Urea Nitrogen 12   Creatinine 0.83   GFR Estimate 74   GFR Estimate If Black 86   Calcium 8.8   Anion Gap 6   Albumin 3.3 (L)   Protein Total 7.8   Bilirubin Total 0.3   Alkaline Phosphatase 89   ALT 98 (H)   AST 62 (H)   Glucose 88   WBC 6.2   Hemoglobin 12.5   Hematocrit 39.9   Platelet Count 228   RBC Count 4.21   MCV 95   MCH 29.7   MCHC 31.3 (L)   RDW 13.6   Diff Method Automated Method   % Neutrophils 50.5   % Lymphocytes 37.6   % Monocytes 6.9   % Eosinophils 4.3   % Basophils 0.5   % Immature Granulocytes 0.2   Nucleated RBCs 0   Absolute Neutrophil 3.1   Absolute Lymphocytes 2.3   Absolute Monocytes 0.4   Absolute Eosinophils 0.3   Absolute Basophils 0.0   Abs Immature Granulocytes 0.0   Absolute Nucleated RBC 0.0         Assessment and Plan:      1. Metastatic breast cancer, ER, CT, HER2+ positive:   Was last treated in [facility] with Herceptin. We have continued Herceptin every 3 weeks as well as daily letrozole.  -Continues to tolerate treatment very well with minimal SEs  --Echo from showed 8/10 showed normal EF without change from previous study. Continue to repeat every 3 months while on Herceptin. (next in Nov)  -CT from 8/10/29 showed stable disease  - Tumor markers pending from today   - will continue Herceptin today.   -since she has been doing so well, provider appt with every other infusion. Will follow-up with Dr. Aguiar in 6 weeks. Will check with him about when he wants new imaging      2. Bone metastasis: Has previously been on xgeva every 6 weeks. On calcium + vitamin D.  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 Note from oral surgery was scanned in chart. Plan to restart xgeva today though patient was unsure if she was cleared by oral surgeon. Will have  RNCC reach out to dental school to get records. Will not give today     3. Transaminitis: Mild. No hepatic lesions noted on CT from May. Has been noted previously in the past. Likely from medications. Will continue to monitor for now. If they were to continue to increase, would get US of liver.    4. GERD: Continue omeprazole daily. Symptoms much improved    5. Chest Pain: Likely 2/2 to bone mets in the sternum. Unchanged. Ok to continue APAP prn    Phone call duration: 16 minutes    Caryn Wheeler PA-C

## 2020-09-22 NOTE — LETTER
"    9/22/2020         RE: Hoda Brush  [address] [facility] [address] [place] [id number]        Dear Colleague,    Thank you for referring your patient, Hoda Brush, to the [facility]. Please see a copy of my visit note below.    Hoda Brush is a 65 year old female who is being evaluated via a billable telephone visit.      The patient has been notified of following:     \"This telephone visit will be conducted via a call between you and your physician/provider. We have found that certain health care needs can be provided without the need for a physical exam.  This service lets us provide the care you need with a short phone conversation.  If a prescription is necessary we can send it directly to your pharmacy.  If lab work is needed we can place an order for that and you can then stop by our lab to have the test done at a later time.    Telephone visits are billed at different rates depending on your insurance coverage. During this emergency period, for some insurers they may be billed the same as an in-person visit.  Please reach out to your insurance provider with any questions.    If during the course of the call the physician/provider feels a telephone visit is not appropriate, you will not be charged for this service.\"    Patient has given verbal consent for Telephone visit?  Yes    What phone number would you like to be contacted at? [phone]    How would you like to obtain your AVS? JOHAN Gregg        History of Present Illness: Hoda Brush is a 65 year old female with ER positive, HER2 positive metastatic left breast cancer (bone).     TREATMENT HISTORY:  A. Initial diagnosis with metastatic breast cancer in [facility].  Neoadjuvant CAF x 6.   B. Left mastectomy. Left axillary node dissection.  C.  Radiation in 1 dose to R iliac region. g Gy.  C. Herceptin for 2 years taxane for a prescribed course then stopped, monthly " zoledronic acid.  She had 2 years of Herceptin with tamoxifen added after chemotherapy.  D.  Tamoxifen alone and zoledronic acid every 3 months.  E.  [place]  We restarted Herceptin every 3 weeks and continued tamoxifen. Bone targeted agent changed to denosumab every 6 weeks.   F. Tamoxifen changed to letrozole 10/8/19.      She is from [facility], formerly from [facility], and came [place] the [place]  She lives with her daughter and is here for continuation of every 3 week Herceptin, which she receives along with tamoxifen.  Her tumor is ER positive, MS positive, HER-2 positive.  She had metastatic disease at the time of presentation with bone-only metastases by report.  She presented with metastatic disease in 02/2014.  Staging was initially bone-only metastases by report.  She did her staging in 02/2014 that showed that she had stage IV disease, T4N2M1 invasive ductal carcinoma of the left breast.  She had a right hip metastasis.  She underwent neoadjuvant CAF, left mastectomy, left axillary lymph node dissection and radiation.  She had radiation of the right iliac region where she had metastatic disease.  Pathology report from [facility] shows the tumor to be positive for ER in 75% of the cells, positive for MS in 40% of the cells, and the HER-2 was 3+ positive by immunohistochemistry.  The Ki-67 was 20%.  She had no evidence of nonbone metastatic disease on her PET/CT scan from 08/2017.     Had acute abdominal pain and N/V and found to have cholecystitis in the ER. Had cholecystectomy on 8/31. Herceptin resumed on 9/13/19. Tamoxifen changed to letrozole on 10/8/19. Last restaging on 5/18/20 was stable.     Interval History:   On the phone with Maltese .     -Eating and drinking well  -No new pain   -No GI or respiratory symptoms   -Has chest pain about every 10 days. Improves with APAP. This is not new  -Denies f/c/ns  -No new lumps or bumps     ROS: 10 point ROS neg other than the symptoms noted above in  the HPI.      PHYSICAL EXAM:    Objective:  General: patient sounds in no audible acute distress, alert and oriented, speech clear and fluid  Resp: Speaking in full sentences, no audible respiratory distress, no cough, no audible wheeze  Psych: able to articulate logical thoughts, able to abstract reason, no tangential thoughts, no hallucinations or delusions  Her affect is normal    Labs:      9/22/2020 09:37   Sodium 137   Potassium 4.0   Chloride 102   Carbon Dioxide 29   Urea Nitrogen 12   Creatinine 0.83   GFR Estimate 74   GFR Estimate If Black 86   Calcium 8.8   Anion Gap 6   Albumin 3.3 (L)   Protein Total 7.8   Bilirubin Total 0.3   Alkaline Phosphatase 89   ALT 98 (H)   AST 62 (H)   Glucose 88   WBC 6.2   Hemoglobin 12.5   Hematocrit 39.9   Platelet Count 228   RBC Count 4.21   MCV 95   MCH 29.7   MCHC 31.3 (L)   RDW 13.6   Diff Method Automated Method   % Neutrophils 50.5   % Lymphocytes 37.6   % Monocytes 6.9   % Eosinophils 4.3   % Basophils 0.5   % Immature Granulocytes 0.2   Nucleated RBCs 0   Absolute Neutrophil 3.1   Absolute Lymphocytes 2.3   Absolute Monocytes 0.4   Absolute Eosinophils 0.3   Absolute Basophils 0.0   Abs Immature Granulocytes 0.0   Absolute Nucleated RBC 0.0         Assessment and Plan:      1. Metastatic breast cancer, ER, [place], HER2+ positive:   Was last treated in [facility] with Herceptin. We have continued Herceptin every 3 weeks as well as daily letrozole.  -Continues to tolerate treatment very well with minimal SEs  --Echo from showed 8/10 showed normal EF without change from previous study. Continue to repeat every 3 months while on Herceptin. (next in Nov)  -CT from 8/10/29 showed stable disease  - Tumor markers pending from today   - will continue Herceptin today.   -since she has been doing so well, provider appt with every other infusion. Will follow-up with Dr. Aguiar in 6 weeks. Will check with him about when he wants new imaging      2. Bone metastasis: Has previously  been on xgeva every 6 weeks. On calcium + vitamin D.  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 Note from oral surgery was scanned in chart. Plan to restart xgeva today though patient was unsure if she was cleared by oral surgeon. Will have RNCC reach out to dental school to get records. Will not give today     3. Transaminitis: Mild. No hepatic lesions noted on CT from May. Has been noted previously in the past. Likely from medications. Will continue to monitor for now. If they were to continue to increase, would get US of liver.    4. GERD: Continue omeprazole daily. Symptoms much improved    5. Chest Pain: Likely 2/2 to bone mets in the sternum. Unchanged. Ok to continue APAP prn    Phone call duration: 16 minutes    Caryn Wheeler PA-C

## 2020-10-02 ENCOUNTER — MYC MEDICAL ADVICE (OUTPATIENT)
Dept: ONCOLOGY | Facility: CLINIC | Age: 65
End: 2020-10-02

## 2020-10-02 DIAGNOSIS — C50.919 METASTATIC BREAST CANCER: ICD-10-CM

## 2020-10-02 RX ORDER — LETROZOLE 2.5 MG/1
2.5 TABLET, FILM COATED ORAL DAILY
Qty: 90 TABLET | Refills: 3 | Status: SHIPPED | OUTPATIENT
Start: 2020-10-02 | End: 2021-09-28

## 2020-10-02 NOTE — TELEPHONE ENCOUNTER
Signed Prescriptions:                        Disp   Refills    letrozole (FEMARA) 2.5 MG tablet           90 tab*3        Sig: Take 1 tablet (2.5 mg) by mouth daily  Authorizing Provider: CAT ÁLVAREZ  Ordering User: FEDERICO LUCAS  Per 8/11/20 visit note: We have continued Herceptin every 3 weeks as well as daily letrozole

## 2020-10-13 ENCOUNTER — PATIENT OUTREACH (OUTPATIENT)
Dept: ONCOLOGY | Facility: CLINIC | Age: 65
End: 2020-10-13

## 2020-10-13 ENCOUNTER — INFUSION THERAPY VISIT (OUTPATIENT)
Dept: ONCOLOGY | Facility: CLINIC | Age: 65
End: 2020-10-13
Attending: INTERNAL MEDICINE
Payer: COMMERCIAL

## 2020-10-13 VITALS
HEART RATE: 87 BPM | TEMPERATURE: 97.8 F | WEIGHT: 183 LBS | SYSTOLIC BLOOD PRESSURE: 111 MMHG | OXYGEN SATURATION: 98 % | BODY MASS INDEX: 32.43 KG/M2 | DIASTOLIC BLOOD PRESSURE: 65 MMHG | RESPIRATION RATE: 16 BRPM

## 2020-10-13 DIAGNOSIS — C50.912 MALIGNANT NEOPLASM OF LEFT FEMALE BREAST, UNSPECIFIED ESTROGEN RECEPTOR STATUS, UNSPECIFIED SITE OF BREAST (H): Primary | ICD-10-CM

## 2020-10-13 DIAGNOSIS — R79.89 ELEVATED LFTS: Primary | ICD-10-CM

## 2020-10-13 LAB
ALBUMIN SERPL-MCNC: 3.2 G/DL (ref 3.4–5)
ALP SERPL-CCNC: 89 U/L (ref 40–150)
ALT SERPL W P-5'-P-CCNC: 132 U/L (ref 0–50)
ANION GAP SERPL CALCULATED.3IONS-SCNC: 4 MMOL/L (ref 3–14)
AST SERPL W P-5'-P-CCNC: 90 U/L (ref 0–45)
BASOPHILS # BLD AUTO: 0 10E9/L (ref 0–0.2)
BASOPHILS NFR BLD AUTO: 0.5 %
BILIRUB SERPL-MCNC: 0.4 MG/DL (ref 0.2–1.3)
BUN SERPL-MCNC: 13 MG/DL (ref 7–30)
CALCIUM SERPL-MCNC: 8.7 MG/DL (ref 8.5–10.1)
CANCER AG27-29 SERPL-ACNC: 6 U/ML (ref 0–39)
CEA SERPL-MCNC: <0.5 UG/L (ref 0–2.5)
CHLORIDE SERPL-SCNC: 110 MMOL/L (ref 94–109)
CO2 SERPL-SCNC: 29 MMOL/L (ref 20–32)
CREAT SERPL-MCNC: 0.78 MG/DL (ref 0.52–1.04)
DIFFERENTIAL METHOD BLD: NORMAL
EOSINOPHIL # BLD AUTO: 0.3 10E9/L (ref 0–0.7)
EOSINOPHIL NFR BLD AUTO: 4.1 %
ERYTHROCYTE [DISTWIDTH] IN BLOOD BY AUTOMATED COUNT: 13.8 % (ref 10–15)
GFR SERPL CREATININE-BSD FRML MDRD: 80 ML/MIN/{1.73_M2}
GLUCOSE SERPL-MCNC: 124 MG/DL (ref 70–99)
HCT VFR BLD AUTO: 38.4 % (ref 35–47)
HGB BLD-MCNC: 12.1 G/DL (ref 11.7–15.7)
IMM GRANULOCYTES # BLD: 0 10E9/L (ref 0–0.4)
IMM GRANULOCYTES NFR BLD: 0.2 %
LYMPHOCYTES # BLD AUTO: 2.1 10E9/L (ref 0.8–5.3)
LYMPHOCYTES NFR BLD AUTO: 32.9 %
MCH RBC QN AUTO: 29.8 PG (ref 26.5–33)
MCHC RBC AUTO-ENTMCNC: 31.5 G/DL (ref 31.5–36.5)
MCV RBC AUTO: 95 FL (ref 78–100)
MONOCYTES # BLD AUTO: 0.3 10E9/L (ref 0–1.3)
MONOCYTES NFR BLD AUTO: 5.1 %
NEUTROPHILS # BLD AUTO: 3.6 10E9/L (ref 1.6–8.3)
NEUTROPHILS NFR BLD AUTO: 57.2 %
NRBC # BLD AUTO: 0 10*3/UL
NRBC BLD AUTO-RTO: 0 /100
PLATELET # BLD AUTO: 197 10E9/L (ref 150–450)
POTASSIUM SERPL-SCNC: 3.9 MMOL/L (ref 3.4–5.3)
PROT SERPL-MCNC: 7.6 G/DL (ref 6.8–8.8)
RBC # BLD AUTO: 4.06 10E12/L (ref 3.8–5.2)
SODIUM SERPL-SCNC: 143 MMOL/L (ref 133–144)
WBC # BLD AUTO: 6.3 10E9/L (ref 4–11)

## 2020-10-13 PROCEDURE — 86300 IMMUNOASSAY TUMOR CA 15-3: CPT | Performed by: PHYSICIAN ASSISTANT

## 2020-10-13 PROCEDURE — 250N000011 HC RX IP 250 OP 636: Mod: JW | Performed by: PHYSICIAN ASSISTANT

## 2020-10-13 PROCEDURE — 99207 PR NO CHARGE LOS: CPT

## 2020-10-13 PROCEDURE — 82378 CARCINOEMBRYONIC ANTIGEN: CPT | Performed by: PHYSICIAN ASSISTANT

## 2020-10-13 PROCEDURE — 85025 COMPLETE CBC W/AUTO DIFF WBC: CPT | Performed by: PATHOLOGY

## 2020-10-13 PROCEDURE — 80053 COMPREHEN METABOLIC PANEL: CPT | Performed by: PATHOLOGY

## 2020-10-13 PROCEDURE — 250N000011 HC RX IP 250 OP 636: Performed by: INTERNAL MEDICINE

## 2020-10-13 PROCEDURE — 258N000003 HC RX IP 258 OP 636: Performed by: PHYSICIAN ASSISTANT

## 2020-10-13 PROCEDURE — 96413 CHEMO IV INFUSION 1 HR: CPT

## 2020-10-13 RX ORDER — HEPARIN SODIUM (PORCINE) LOCK FLUSH IV SOLN 100 UNIT/ML 100 UNIT/ML
5 SOLUTION INTRAVENOUS EVERY 8 HOURS
Status: DISCONTINUED | OUTPATIENT
Start: 2020-10-13 | End: 2020-10-13 | Stop reason: HOSPADM

## 2020-10-13 RX ORDER — HEPARIN SODIUM (PORCINE) LOCK FLUSH IV SOLN 100 UNIT/ML 100 UNIT/ML
5 SOLUTION INTRAVENOUS EVERY 8 HOURS PRN
Status: DISCONTINUED | OUTPATIENT
Start: 2020-10-13 | End: 2020-10-13 | Stop reason: HOSPADM

## 2020-10-13 RX ADMIN — Medication 5 ML: at 08:40

## 2020-10-13 RX ADMIN — TRASTUZUMAB 496 MG: 150 INJECTION, POWDER, LYOPHILIZED, FOR SOLUTION INTRAVENOUS at 09:27

## 2020-10-13 RX ADMIN — Medication 5 ML: at 09:28

## 2020-10-13 ASSESSMENT — PAIN SCALES - GENERAL: PAINLEVEL: NO PAIN (0)

## 2020-10-13 NOTE — PROGRESS NOTES
Infusion Nursing Note:  Hoda Brush presents today for C53 Herceptin.    Patient seen by provider today: No   present during visit today: Yes, Language: St Helen[place].     Note: Pt feeling well today.  No s/s of infection, ok for treatment.  Pt did not receive Xgeva due to dental issues.  PT sees Erica on 11/3 and will discuss.    Intravenous Access:  Implanted Port.    Treatment Conditions:  Lab Results   Component Value Date    HGB 12.1 10/13/2020     Lab Results   Component Value Date    WBC 6.3 10/13/2020      Lab Results   Component Value Date    ANEU 3.6 10/13/2020     Lab Results   Component Value Date     10/13/2020      ECHO/MUGA completed 8/10  EF 55-60%.      Post Infusion Assessment:  Patient tolerated infusion without incident.  Blood return noted pre and post infusion.  Site patent and intact, free from redness, edema or discomfort.  No evidence of extravasations.  Access discontinued per protocol.       Discharge Plan:   Patient declined prescription refills.  Discharge instructions reviewed with: Patient.  Patient and/or family verbalized understanding of discharge instructions and all questions answered.  Copy of AVS reviewed with patient and/or family.  Patient will return 11/3 for next appointment.  Patient discharged in stable condition accompanied by: self.  Departure Mode: Ambulatory.    Susu Seay RN

## 2020-10-13 NOTE — NURSING NOTE
Chief Complaint   Patient presents with     Port Draw     Labs drawn via PORT by RN in lab. VS taken.      Karina Gant RN

## 2020-10-13 NOTE — PROGRESS NOTES
Spoke to patient with Tanya Marley's recommendations with lab work showing further increase of LFT's and having a liver U/S. Message sent to scheduling to arrange the U/S.  Answered all patient's questions and verbalized understanding. Cortney Ramos RN, BSN.

## 2020-10-13 NOTE — PATIENT INSTRUCTIONS
Contact Numbers  [facility]: [phone]    After Hours:  [phone]  Triage: [phone]    Please call the [facility] Triage line if you experience a temperature greater than or equal to 100.5, shaking chills, have uncontrolled nausea, vomiting and/or diarrhea, dizziness, shortness of breath, chest pain, bleeding, unexplained bruising, or if you have any other new/concerning symptoms, questions or concerns.     If it is after hours, weekends, or holidays, please call the main hospital  at  [phone] and ask to speak to the Oncology doctor on call.     If you are having any concerning symptoms or wish to speak to a provider before your next infusion visit, please call your care coordinator or triage to notify them so we can adequately serve you.     If you need a refill on a narcotic prescription or other medication, please call triage before your infusion appointment.         October 2020 Sunday Monday Tuesday Wednesday Thursday Friday Saturday                       1     2     3       4     5     6     7     8     9     10       11     12     13    [facility]   8:15 AM   (60 min.)    [facility]   [facility]    [facility] INFUSION 60   9:00 AM   (60 min.)    ONCOLOGY INFUSION   [facility] 14     15     16     17       18     19     20     21     22     23     24       25     26     27     28     29     30     31                 November 2020 Sunday Monday Tuesday Wednesday Thursday Friday Saturday   1     2     3    [facility]   7:15 AM   (15 min.)   [facility]   [facility]    TELEPHONE VISIT RETURN   7:50 AM   (50 min.)   Christine Beasley PA-C   [facility]    ECHO COMPLETE   9:00 AM   (60 min.)   [facility]CR2   [facility]    [facility] ON[facility] INFUSION 60  11:00 AM   (60 min.)    ONCOLOGY INFUSION   [facility]  [facility] 4     5     6     7       8     9     10     11     12     13     14       15     16     17     18     19     20     21       22     23     24     25     26     27     28       29     30                                             Recent Results (from the past 24 hour(s))   CBC with platelets differential    Collection Time: 10/13/20  8:41 AM   Result Value Ref Range    WBC 6.3 4.0 - 11.0 10e9/L    RBC Count 4.06 3.8 - 5.2 10e12/L    Hemoglobin 12.1 11.7 - 15.7 g/dL    Hematocrit 38.4 35.0 - 47.0 %    MCV 95 78 - 100 fl    MCH 29.8 26.5 - 33.0 pg    MCHC 31.5 31.5 - 36.5 g/dL    RDW 13.8 10.0 - 15.0 %    Platelet Count 197 150 - 450 10e9/L    Diff Method Automated Method     % Neutrophils 57.2 %    % Lymphocytes 32.9 %    % Monocytes 5.1 %    % Eosinophils 4.1 %    % Basophils 0.5 %    % Immature Granulocytes 0.2 %    Nucleated RBCs 0 0 /100    Absolute Neutrophil 3.6 1.6 - 8.3 10e9/L    Absolute Lymphocytes 2.1 0.8 - 5.3 10e9/L    Absolute Monocytes 0.3 0.0 - 1.3 10e9/L    Absolute Eosinophils 0.3 0.0 - 0.7 10e9/L    Absolute Basophils 0.0 0.0 - 0.2 10e9/L    Abs Immature Granulocytes 0.0 0 - 0.4 10e9/L    Absolute Nucleated RBC 0.0    Comprehensive metabolic panel    Collection Time: 10/13/20  8:41 AM   Result Value Ref Range    Sodium 143 133 - 144 mmol/L    Potassium 3.9 3.4 - 5.3 mmol/L    Chloride 110 (H) 94 - 109 mmol/L    Carbon Dioxide 29 20 - 32 mmol/L    Anion Gap 4 3 - 14 mmol/L    Glucose 124 (H) 70 - 99 mg/dL    Urea Nitrogen 13 7 - 30 mg/dL    Creatinine 0.78 0.52 - 1.04 mg/dL    GFR Estimate 80 >60 mL/min/[1.73_m2]    GFR Estimate If Black >90 >60 mL/min/[1.73_m2]    Calcium 8.7 8.5 - 10.1 mg/dL    Bilirubin Total 0.4 0.2 - 1.3 mg/dL    Albumin 3.2 (L) 3.4 - 5.0 g/dL    Protein Total 7.6 6.8 - 8.8 g/dL    Alkaline Phosphatase 89 40 - 150 U/L     (H) 0 - 50 U/L    AST 90 (H) 0 - 45 U/L

## 2020-10-15 ENCOUNTER — ANCILLARY PROCEDURE (OUTPATIENT)
Dept: ULTRASOUND IMAGING | Facility: CLINIC | Age: 65
End: 2020-10-15
Attending: PHYSICIAN ASSISTANT
Payer: COMMERCIAL

## 2020-10-15 DIAGNOSIS — R79.89 ELEVATED LFTS: ICD-10-CM

## 2020-10-15 PROCEDURE — 76705 ECHO EXAM OF ABDOMEN: CPT | Performed by: RADIOLOGY

## 2020-11-03 ENCOUNTER — VIRTUAL VISIT (OUTPATIENT)
Dept: ONCOLOGY | Facility: CLINIC | Age: 65
End: 2020-11-03
Attending: PHYSICIAN ASSISTANT
Payer: COMMERCIAL

## 2020-11-03 ENCOUNTER — ANCILLARY PROCEDURE (OUTPATIENT)
Dept: CARDIOLOGY | Facility: CLINIC | Age: 65
End: 2020-11-03
Attending: INTERNAL MEDICINE
Payer: COMMERCIAL

## 2020-11-03 ENCOUNTER — INFUSION THERAPY VISIT (OUTPATIENT)
Dept: ONCOLOGY | Facility: CLINIC | Age: 65
End: 2020-11-03
Attending: INTERNAL MEDICINE
Payer: COMMERCIAL

## 2020-11-03 VITALS
TEMPERATURE: 98.4 F | WEIGHT: 179.7 LBS | OXYGEN SATURATION: 94 % | DIASTOLIC BLOOD PRESSURE: 77 MMHG | HEART RATE: 92 BPM | SYSTOLIC BLOOD PRESSURE: 120 MMHG | RESPIRATION RATE: 16 BRPM | BODY MASS INDEX: 31.84 KG/M2

## 2020-11-03 DIAGNOSIS — C50.912 MALIGNANT NEOPLASM OF LEFT FEMALE BREAST, UNSPECIFIED ESTROGEN RECEPTOR STATUS, UNSPECIFIED SITE OF BREAST (H): ICD-10-CM

## 2020-11-03 DIAGNOSIS — C50.919 METASTATIC BREAST CANCER: Primary | ICD-10-CM

## 2020-11-03 DIAGNOSIS — Z51.11 ENCOUNTER FOR ANTINEOPLASTIC CHEMOTHERAPY: ICD-10-CM

## 2020-11-03 DIAGNOSIS — C50.919 METASTATIC BREAST CANCER: ICD-10-CM

## 2020-11-03 DIAGNOSIS — C79.51 BONE METASTASIS: ICD-10-CM

## 2020-11-03 DIAGNOSIS — C79.51 BONE METASTASIS: Primary | ICD-10-CM

## 2020-11-03 LAB
ALBUMIN SERPL-MCNC: 3.4 G/DL (ref 3.4–5)
ALP SERPL-CCNC: 77 U/L (ref 40–150)
ALT SERPL W P-5'-P-CCNC: 94 U/L (ref 0–50)
ANION GAP SERPL CALCULATED.3IONS-SCNC: 4 MMOL/L (ref 3–14)
AST SERPL W P-5'-P-CCNC: 54 U/L (ref 0–45)
BASOPHILS # BLD AUTO: 0.1 10E9/L (ref 0–0.2)
BASOPHILS NFR BLD AUTO: 0.8 %
BILIRUB SERPL-MCNC: 0.3 MG/DL (ref 0.2–1.3)
BUN SERPL-MCNC: 14 MG/DL (ref 7–30)
CALCIUM SERPL-MCNC: 8.8 MG/DL (ref 8.5–10.1)
CANCER AG27-29 SERPL-ACNC: 11 U/ML (ref 0–39)
CEA SERPL-MCNC: 0.7 UG/L (ref 0–2.5)
CHLORIDE SERPL-SCNC: 107 MMOL/L (ref 94–109)
CO2 SERPL-SCNC: 29 MMOL/L (ref 20–32)
CREAT SERPL-MCNC: 0.76 MG/DL (ref 0.52–1.04)
DIFFERENTIAL METHOD BLD: ABNORMAL
EOSINOPHIL # BLD AUTO: 0.5 10E9/L (ref 0–0.7)
EOSINOPHIL NFR BLD AUTO: 6.8 %
ERYTHROCYTE [DISTWIDTH] IN BLOOD BY AUTOMATED COUNT: 13.4 % (ref 10–15)
GFR SERPL CREATININE-BSD FRML MDRD: 82 ML/MIN/{1.73_M2}
GLUCOSE SERPL-MCNC: 99 MG/DL (ref 70–99)
HCT VFR BLD AUTO: 39.7 % (ref 35–47)
HGB BLD-MCNC: 12.2 G/DL (ref 11.7–15.7)
IMM GRANULOCYTES # BLD: 0 10E9/L (ref 0–0.4)
IMM GRANULOCYTES NFR BLD: 0.1 %
LYMPHOCYTES # BLD AUTO: 2.4 10E9/L (ref 0.8–5.3)
LYMPHOCYTES NFR BLD AUTO: 33.4 %
MCH RBC QN AUTO: 29.3 PG (ref 26.5–33)
MCHC RBC AUTO-ENTMCNC: 30.7 G/DL (ref 31.5–36.5)
MCV RBC AUTO: 95 FL (ref 78–100)
MONOCYTES # BLD AUTO: 0.5 10E9/L (ref 0–1.3)
MONOCYTES NFR BLD AUTO: 6.8 %
NEUTROPHILS # BLD AUTO: 3.7 10E9/L (ref 1.6–8.3)
NEUTROPHILS NFR BLD AUTO: 52.1 %
NRBC # BLD AUTO: 0 10*3/UL
NRBC BLD AUTO-RTO: 0 /100
PLATELET # BLD AUTO: 209 10E9/L (ref 150–450)
POTASSIUM SERPL-SCNC: 4 MMOL/L (ref 3.4–5.3)
PROT SERPL-MCNC: 7.8 G/DL (ref 6.8–8.8)
RBC # BLD AUTO: 4.16 10E12/L (ref 3.8–5.2)
SODIUM SERPL-SCNC: 141 MMOL/L (ref 133–144)
WBC # BLD AUTO: 7.2 10E9/L (ref 4–11)

## 2020-11-03 PROCEDURE — 93308 TTE F-UP OR LMTD: CPT | Performed by: STUDENT IN AN ORGANIZED HEALTH CARE EDUCATION/TRAINING PROGRAM

## 2020-11-03 PROCEDURE — 96413 CHEMO IV INFUSION 1 HR: CPT

## 2020-11-03 PROCEDURE — 80053 COMPREHEN METABOLIC PANEL: CPT | Performed by: INTERNAL MEDICINE

## 2020-11-03 PROCEDURE — 86300 IMMUNOASSAY TUMOR CA 15-3: CPT | Performed by: INTERNAL MEDICINE

## 2020-11-03 PROCEDURE — 93321 DOPPLER ECHO F-UP/LMTD STD: CPT | Performed by: STUDENT IN AN ORGANIZED HEALTH CARE EDUCATION/TRAINING PROGRAM

## 2020-11-03 PROCEDURE — 250N000011 HC RX IP 250 OP 636: Performed by: PHYSICIAN ASSISTANT

## 2020-11-03 PROCEDURE — 258N000003 HC RX IP 258 OP 636: Performed by: PHYSICIAN ASSISTANT

## 2020-11-03 PROCEDURE — 82378 CARCINOEMBRYONIC ANTIGEN: CPT | Performed by: INTERNAL MEDICINE

## 2020-11-03 PROCEDURE — 99207 PR NO CHARGE LOS: CPT

## 2020-11-03 PROCEDURE — 85025 COMPLETE CBC W/AUTO DIFF WBC: CPT | Performed by: INTERNAL MEDICINE

## 2020-11-03 PROCEDURE — 93325 DOPPLER ECHO COLOR FLOW MAPG: CPT | Performed by: STUDENT IN AN ORGANIZED HEALTH CARE EDUCATION/TRAINING PROGRAM

## 2020-11-03 PROCEDURE — 250N000011 HC RX IP 250 OP 636: Mod: TEL | Performed by: PHYSICIAN ASSISTANT

## 2020-11-03 PROCEDURE — 99214 OFFICE O/P EST MOD 30 MIN: CPT | Mod: 95 | Performed by: PHYSICIAN ASSISTANT

## 2020-11-03 RX ORDER — EPINEPHRINE 1 MG/ML
0.3 INJECTION, SOLUTION INTRAMUSCULAR; SUBCUTANEOUS EVERY 5 MIN PRN
Status: CANCELLED | OUTPATIENT
Start: 2020-11-24

## 2020-11-03 RX ORDER — ALBUTEROL SULFATE 90 UG/1
1-2 AEROSOL, METERED RESPIRATORY (INHALATION)
Status: CANCELLED
Start: 2020-11-03

## 2020-11-03 RX ORDER — SODIUM CHLORIDE 9 MG/ML
1000 INJECTION, SOLUTION INTRAVENOUS CONTINUOUS PRN
Status: CANCELLED
Start: 2020-11-24

## 2020-11-03 RX ORDER — LORAZEPAM 2 MG/ML
0.5 INJECTION INTRAMUSCULAR EVERY 4 HOURS PRN
Status: CANCELLED
Start: 2020-11-03

## 2020-11-03 RX ORDER — ALBUTEROL SULFATE 0.83 MG/ML
2.5 SOLUTION RESPIRATORY (INHALATION)
Status: CANCELLED | OUTPATIENT
Start: 2020-11-03

## 2020-11-03 RX ORDER — DIPHENHYDRAMINE HCL 25 MG
50 CAPSULE ORAL ONCE
Status: CANCELLED
Start: 2020-11-24

## 2020-11-03 RX ORDER — EPINEPHRINE 1 MG/ML
0.3 INJECTION, SOLUTION INTRAMUSCULAR; SUBCUTANEOUS EVERY 5 MIN PRN
Status: CANCELLED | OUTPATIENT
Start: 2020-11-03

## 2020-11-03 RX ORDER — HEPARIN SODIUM (PORCINE) LOCK FLUSH IV SOLN 100 UNIT/ML 100 UNIT/ML
5 SOLUTION INTRAVENOUS EVERY 8 HOURS
Status: DISCONTINUED | OUTPATIENT
Start: 2020-11-03 | End: 2020-11-03 | Stop reason: HOSPADM

## 2020-11-03 RX ORDER — EPINEPHRINE 0.3 MG/.3ML
0.3 INJECTION SUBCUTANEOUS EVERY 5 MIN PRN
Status: CANCELLED | OUTPATIENT
Start: 2020-11-03

## 2020-11-03 RX ORDER — METHYLPREDNISOLONE SODIUM SUCCINATE 125 MG/2ML
125 INJECTION, POWDER, LYOPHILIZED, FOR SOLUTION INTRAMUSCULAR; INTRAVENOUS
Status: CANCELLED
Start: 2020-11-03

## 2020-11-03 RX ORDER — HEPARIN SODIUM (PORCINE) LOCK FLUSH IV SOLN 100 UNIT/ML 100 UNIT/ML
5 SOLUTION INTRAVENOUS EVERY 8 HOURS
Status: CANCELLED | OUTPATIENT
Start: 2020-11-03

## 2020-11-03 RX ORDER — HEPARIN SODIUM (PORCINE) LOCK FLUSH IV SOLN 100 UNIT/ML 100 UNIT/ML
5 SOLUTION INTRAVENOUS EVERY 8 HOURS
Status: CANCELLED | OUTPATIENT
Start: 2020-11-24

## 2020-11-03 RX ORDER — MEPERIDINE HYDROCHLORIDE 25 MG/ML
25 INJECTION INTRAMUSCULAR; INTRAVENOUS; SUBCUTANEOUS EVERY 30 MIN PRN
Status: CANCELLED | OUTPATIENT
Start: 2020-11-03

## 2020-11-03 RX ORDER — HEPARIN SODIUM (PORCINE) LOCK FLUSH IV SOLN 100 UNIT/ML 100 UNIT/ML
5 SOLUTION INTRAVENOUS ONCE
Status: COMPLETED | OUTPATIENT
Start: 2020-11-03 | End: 2020-11-03

## 2020-11-03 RX ORDER — SODIUM CHLORIDE 9 MG/ML
1000 INJECTION, SOLUTION INTRAVENOUS CONTINUOUS PRN
Status: CANCELLED
Start: 2020-11-03

## 2020-11-03 RX ORDER — DIPHENHYDRAMINE HYDROCHLORIDE 50 MG/ML
50 INJECTION INTRAMUSCULAR; INTRAVENOUS
Status: CANCELLED
Start: 2020-11-24

## 2020-11-03 RX ORDER — ALBUTEROL SULFATE 90 UG/1
1-2 AEROSOL, METERED RESPIRATORY (INHALATION)
Status: CANCELLED
Start: 2020-11-24

## 2020-11-03 RX ORDER — METHYLPREDNISOLONE SODIUM SUCCINATE 125 MG/2ML
125 INJECTION, POWDER, LYOPHILIZED, FOR SOLUTION INTRAMUSCULAR; INTRAVENOUS
Status: CANCELLED
Start: 2020-11-24

## 2020-11-03 RX ORDER — ALBUTEROL SULFATE 0.83 MG/ML
2.5 SOLUTION RESPIRATORY (INHALATION)
Status: CANCELLED | OUTPATIENT
Start: 2020-11-24

## 2020-11-03 RX ORDER — MEPERIDINE HYDROCHLORIDE 25 MG/ML
25 INJECTION INTRAMUSCULAR; INTRAVENOUS; SUBCUTANEOUS EVERY 30 MIN PRN
Status: CANCELLED | OUTPATIENT
Start: 2020-11-24

## 2020-11-03 RX ORDER — DIPHENHYDRAMINE HYDROCHLORIDE 50 MG/ML
50 INJECTION INTRAMUSCULAR; INTRAVENOUS
Status: CANCELLED
Start: 2020-11-03

## 2020-11-03 RX ORDER — EPINEPHRINE 0.3 MG/.3ML
0.3 INJECTION SUBCUTANEOUS EVERY 5 MIN PRN
Status: CANCELLED | OUTPATIENT
Start: 2020-11-24

## 2020-11-03 RX ORDER — DIPHENHYDRAMINE HCL 25 MG
50 CAPSULE ORAL ONCE
Status: CANCELLED
Start: 2020-11-03

## 2020-11-03 RX ORDER — ACETAMINOPHEN 325 MG/1
650 TABLET ORAL
Status: CANCELLED | OUTPATIENT
Start: 2020-11-24

## 2020-11-03 RX ORDER — LORAZEPAM 2 MG/ML
0.5 INJECTION INTRAMUSCULAR EVERY 4 HOURS PRN
Status: CANCELLED
Start: 2020-11-24

## 2020-11-03 RX ORDER — ACETAMINOPHEN 325 MG/1
650 TABLET ORAL
Status: CANCELLED | OUTPATIENT
Start: 2020-11-03

## 2020-11-03 RX ADMIN — Medication 5 ML: at 09:52

## 2020-11-03 RX ADMIN — Medication 5 ML: at 07:51

## 2020-11-03 RX ADMIN — TRASTUZUMAB 450 MG: 150 INJECTION, POWDER, LYOPHILIZED, FOR SOLUTION INTRAVENOUS at 09:52

## 2020-11-03 ASSESSMENT — PAIN SCALES - GENERAL: PAINLEVEL: NO PAIN (0)

## 2020-11-03 NOTE — LETTER
11/3/2020         RE: Hoda Brush  [address]  [place] [address]        Dear Colleague,    Thank you for referring your patient, Hoda Brush, to the [facility]. Please see a copy of my visit note below.    Nov 3, 2020  PHONE    History of Present Illness: Hoda Brush is a 65 year old female with ER positive, HER2 positive metastatic left breast cancer (bone).     TREATMENT HISTORY:  A. Initial diagnosis with metastatic breast cancer in [facility].  Neoadjuvant CAF x 6.   B. Left mastectomy. Left axillary node dissection.  C.  Radiation in 1 dose to R iliac region. g Gy.  C. Herceptin for 2 years taxane for a prescribed course then stopped, monthly zoledronic acid.  She had 2 years of Herceptin with tamoxifen added after chemotherapy.  D.  Tamoxifen alone and zoledronic acid every 3 months.  E.  [place]  We restarted Herceptin every 3 weeks and continued tamoxifen. Bone targeted agent changed to denosumab every 6 weeks.   F. Tamoxifen changed to letrozole 10/8/19.      She is from [place] [facility] [place], formerly from [facility] [place], and came [place] the [place]  She lives with her daughter and is here for continuation of every 3 week Herceptin, which she receives along with tamoxifen.  Her tumor is ER positive, ID positive, HER-2 positive.  She had metastatic disease at the time of presentation with bone-only metastases by report.  She presented with metastatic disease in 02/2014.  Staging was initially bone-only metastases by report.  She did her staging in 02/2014 that showed that she had stage IV disease, T4N2M1 invasive ductal carcinoma of the left breast.  She had a right hip metastasis.  She underwent neoadjuvant CAF, left mastectomy, left axillary lymph node dissection and radiation.  She had radiation of the right iliac region where she had metastatic disease.  Pathology report from [facility] shows the tumor to be positive for ER in 75% of the  cells, positive for SC in 40% of the cells, and the HER-2 was 3+ positive by immunohistochemistry.  The Ki-67 was 20%.  She had no evidence of nonbone metastatic disease on her PET/CT scan from 08/2017.     Had acute abdominal pain and N/V and found to have cholecystitis in the ER. Had cholecystectomy on 8/31. Herceptin resumed on 9/13/19. Tamoxifen changed to letrozole on 10/8/19. Last restaging on 5/18/20 was stable.     Interval History:   On the phone with [place]ian .     -Eating and drinking well  -No new pain   -No GI issues, no N/V.  Taking PPI daily  -No respiratory symptoms or Covid contacts   -Has chest pain about every 10 days. Improves with APAP. This is not new  -Denies f/c/s- no hot flashes or joint pains.   -No new lumps or bumps   -good energy levels, walks twice a day, likes to read and embroidery     ROS: 10 point ROS neg other than the symptoms noted above in the HPI.      PHYSICAL EXAM:    Objective:  General: patient sounds in no audible acute distress, alert and oriented, speech clear and fluid  Resp: Speaking in full sentences, no audible respiratory distress, no cough, no audible wheeze  Psych: able to articulate logical thoughts, able to abstract reason, no tangential thoughts, no hallucinations or delusions  Her affect is normal    Labs:      9/22/2020 09:37 10/13/2020 08:41 11/3/2020 07:57   WBC 6.2 6.3 7.2   Hemoglobin 12.5 12.1 12.2   Hematocrit 39.9 38.4 39.7   Platelet Count 228 197 209   RBC Count 4.21 4.06 4.16   MCV 95 95 95   MCH 29.7 29.8 29.3   MCHC 31.3 (L) 31.5 30.7 (L)   RDW 13.6 13.8 13.4   Diff Method Automated Method Automated Method Automated Method   % Neutrophils 50.5 57.2 52.1   % Lymphocytes 37.6 32.9 33.4   % Monocytes 6.9 5.1 6.8   % Eosinophils 4.3 4.1 6.8   % Basophils 0.5 0.5 0.8   % Immature Granulocytes 0.2 0.2 0.1   Nucleated RBCs 0 0 0   Absolute Neutrophil 3.1 3.6 3.7      9/22/2020 09:37 10/13/2020 08:41 11/3/2020 07:57   Sodium 137 143 141  "  Potassium 4.0 3.9 4.0   Chloride 102 110 (H) 107   Carbon Dioxide 29 29 29   Urea Nitrogen 12 13 14   Creatinine 0.83 0.78 0.76   GFR Estimate 74 80 82   GFR Estimate If Black 86 >90 >90   Calcium 8.8 8.7 8.8   Anion Gap 6 4 4   Albumin 3.3 (L) 3.2 (L) 3.4   Protein Total 7.8 7.6 7.8   Bilirubin Total 0.3 0.4 0.3   Alkaline Phosphatase 89 89 77                     US LIVER IMPRESSION:     Hepatic steatosis. No focal hepatic lesion.      Assessment and Plan:      1. Metastatic breast cancer, ER, [place], HER2+ positive:   Was last treated in [facility] with Herceptin. We have continued Herceptin every 3 weeks as well as daily letrozole.  -Continues to tolerate treatment very well with minimal SEs  --Echo from showed 8/10 showed normal EF without change from previous study. Continue to repeat every 3 months while on Herceptin. (today after visit)  - CT from 8/10/29 showed stable disease  - Tumor markers have been relatively stable.  Will be due for CT and DR Aguiar in December  - will continue Herceptin today.      2. Bone metastasis: Has previously been on xgeva every 6 weeks. On calcium + vitamin D.  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 This was biopsy proven. Note from oral surgery was scanned in chart. Unsure if she is cleared from oral surgery, there are over 40 pages of scanned notes.  For now will hold.     3. Transaminitis: Mild. US showed fatty liver, she is walking twice a day for exercise.    4. GERD: Continue omeprazole daily. Symptoms much improved    5. Chest Pain: Likely 2/2 to bone mets in the sternum. Unchanged. Ok to continue APAP prn      Phone: 21 min  Erica Beasley PA-C    Hoda Brush is a 65 year old female who is being evaluated via a billable telephone visit.      The patient has been notified of following:     \"This telephone visit will be conducted via a call between you and your physician/provider. We have found that certain health care needs can be " "provided without the need for a physical exam.  This service lets us provide the care you need with a short phone conversation.  If a prescription is necessary we can send it directly to your pharmacy.  If lab work is needed we can place an order for that and you can then stop by our lab to have the test done at a later time.    Telephone visits are billed at different rates depending on your insurance coverage. During this emergency period, for some insurers they may be billed the same as an in-person visit.  Please reach out to your insurance provider with any questions.    If during the course of the call the physician/provider feels a telephone visit is not appropriate, you will not be charged for this service.\"    Patient has given verbal consent for Telephone visit?  Yes    What phone number would you like to be contacted at? [phone]    How would you like to obtain your AVS? MyChart     /77 (BP Location: Right arm, Patient Position: Sitting, Cuff Size: Adult Regular)   Pulse 92   Temp 98.4  F (36.9  C) (Tympanic)   Resp 16   Wt 81.5 kg (179 lb 11.2 oz)   SpO2 94%   BMI 31.84 kg/m      Vik Tenorio LPBRIDGETTE    Phone call duration: 21 minutes    Erica Beasley PA-C        Again, thank you for allowing me to participate in the care of your patient.        Sincerely,        Christine Beasley PA-C    "

## 2020-11-03 NOTE — PROGRESS NOTES
Nov 3, 2020  PHONE    History of Present Illness: Hoda Brush is a 65 year old female with ER positive, HER2 positive metastatic left breast cancer (bone).     TREATMENT HISTORY:  A. Initial diagnosis with metastatic breast cancer in [facility].  Neoadjuvant CAF x 6.   B. Left mastectomy. Left axillary node dissection.  C.  Radiation in 1 dose to R iliac region. g Gy.  C. Herceptin for 2 years taxane for a prescribed course then stopped, monthly zoledronic acid.  She had 2 years of Herceptin with tamoxifen added after chemotherapy.  D.  Tamoxifen alone and zoledronic acid every 3 months.  E.  [place]  We restarted Herceptin every 3 weeks and continued tamoxifen. Bone targeted agent changed to denosumab every 6 weeks.   F. Tamoxifen changed to letrozole 10/8/19.      She is from [facility], [place] from [facility], and [place]  She lives with her daughter and is here for continuation of every 3 week Herceptin, which she receives along with tamoxifen.  Her tumor is ER positive, CO positive, HER-2 positive.  She had metastatic disease at the time of presentation with bone-only metastases by report.  She presented with metastatic disease in 02/2014.  Staging was initially bone-only metastases by report.  She did her staging in 02/2014 that showed that she had stage IV disease, T4N2M1 invasive ductal carcinoma of the left breast.  She had a right hip metastasis.  She underwent neoadjuvant CAF, left mastectomy, left axillary lymph node dissection and radiation.  She had radiation of the right iliac region where she had metastatic disease.  Pathology report from [facility] shows the tumor to be positive for ER in 75% of the cells, positive for CO in 40% of the cells, and the HER-2 was 3+ positive by immunohistochemistry.  The Ki-67 was 20%.  She had no evidence of nonbone metastatic disease on her PET/CT scan from 08/2017.     Had acute abdominal pain and N/V and found to have cholecystitis in the  ER. Had cholecystectomy on 8/31. Herceptin resumed on 9/13/19. Tamoxifen changed to letrozole on 10/8/19. Last restaging on 5/18/20 was stable.     Interval History:   On the phone with [place] .     -Eating and drinking well  -No new pain   -No GI issues, no N/V.  Taking PPI daily  -No respiratory symptoms or Covid contacts   -Has chest pain about every 10 days. Improves with APAP. This is not new  -Denies f/c/s- no hot flashes or joint pains.   -No new lumps or bumps   -good energy levels, walks twice a day, likes to read and embroidery     ROS: 10 point ROS neg other than the symptoms noted above in the HPI.      PHYSICAL EXAM:    Objective:  General: patient sounds in no audible acute distress, alert and oriented, speech clear and fluid  Resp: Speaking in full sentences, no audible respiratory distress, no cough, no audible wheeze  Psych: able to articulate logical thoughts, able to abstract reason, no tangential thoughts, no hallucinations or delusions  Her affect is normal    Labs:      9/22/2020 09:37 10/13/2020 08:41 11/3/2020 07:57   WBC 6.2 6.3 7.2   Hemoglobin 12.5 12.1 12.2   Hematocrit 39.9 38.4 39.7   Platelet Count 228 197 209   RBC Count 4.21 4.06 4.16   MCV 95 95 95   MCH 29.7 29.8 29.3   MCHC 31.3 (L) 31.5 30.7 (L)   RDW 13.6 13.8 13.4   Diff Method Automated Method Automated Method Automated Method   % Neutrophils 50.5 57.2 52.1   % Lymphocytes 37.6 32.9 33.4   % Monocytes 6.9 5.1 6.8   % Eosinophils 4.3 4.1 6.8   % Basophils 0.5 0.5 0.8   % Immature Granulocytes 0.2 0.2 0.1   Nucleated RBCs 0 0 0   Absolute Neutrophil 3.1 3.6 3.7      9/22/2020 09:37 10/13/2020 08:41 11/3/2020 07:57   Sodium 137 143 141   Potassium 4.0 3.9 4.0   Chloride 102 110 (H) 107   Carbon Dioxide 29 29 29   Urea Nitrogen 12 13 14   Creatinine 0.83 0.78 0.76   GFR Estimate 74 80 82   GFR Estimate If Black 86 >90 >90   Calcium 8.8 8.7 8.8   Anion Gap 6 4 4   Albumin 3.3 (L) 3.2 (L) 3.4   Protein Total 7.8 7.6 7.8    Bilirubin Total 0.3 0.4 0.3   Alkaline Phosphatase 89 89 77                     US LIVER IMPRESSION:     Hepatic steatosis. No focal hepatic lesion.      Assessment and Plan:      1. Metastatic breast cancer, ER, [place], HER2+ positive:   Was last treated in [facility] with Herceptin. We have continued Herceptin every 3 weeks as well as daily letrozole.  -Continues to tolerate treatment very well with minimal SEs  --Echo from showed 8/10 showed normal EF without change from previous study. Continue to repeat every 3 months while on Herceptin. (today after visit)  - CT from 8/10/29 showed stable disease  - Tumor markers have been relatively stable.  Will be due for CT and DR Aguiar in December  - will continue Herceptin today.      2. Bone metastasis: Has previously been on xgeva every 6 weeks. On calcium + vitamin D.  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 This was biopsy proven. Note from oral surgery was scanned in chart. Unsure if she is cleared from oral surgery, there are over 40 pages of scanned notes.  For now will hold.     3. Transaminitis: Mild. US showed fatty liver, she is walking twice a day for exercise.    4. GERD: Continue omeprazole daily. Symptoms much improved    5. Chest Pain: Likely 2/2 to bone mets in the sternum. Unchanged. Ok to continue APAP prn      Phone: 21 min  Erica Beasley PA-C

## 2020-11-03 NOTE — PROGRESS NOTES
"Hoda Brush is a 65 year old female who is being evaluated via a billable telephone visit.      The patient has been notified of following:     \"This telephone visit will be conducted via a call between you and your physician/provider. We have found that certain health care needs can be provided without the need for a physical exam.  This service lets us provide the care you need with a short phone conversation.  If a prescription is necessary we can send it directly to your pharmacy.  If lab work is needed we can place an order for that and you can then stop by our lab to have the test done at a later time.    Telephone visits are billed at different rates depending on your insurance coverage. During this emergency period, for some insurers they may be billed the same as an in-person visit.  Please reach out to your insurance provider with any questions.    If during the course of the call the physician/provider feels a telephone visit is not appropriate, you will not be charged for this service.\"    Patient has given verbal consent for Telephone visit?  Yes    What phone number would you like to be contacted at? [phone]    How would you like to obtain your AVS? MyChart     /77 (BP Location: Right arm, Patient Position: Sitting, Cuff Size: Adult Regular)   Pulse 92   Temp 98.4  F (36.9  C) (Tympanic)   Resp 16   Wt 81.5 kg (179 lb 11.2 oz)   SpO2 94%   BMI 31.84 kg/m      Vik Tenorio LPN    Phone call duration: 21 minutes    Erica Beasley PA-C    "

## 2020-11-03 NOTE — PROGRESS NOTES
Infusion Nursing Note:  Hoda Brush presents today for C54 Herceptin.    Patient seen by provider today: Yes: SEMAJ Del Rio   present during visit today: Not Applicable.    Note: Pt saw provider prior to infusion, ok for treatment.  Per Erica's note, there is still question whether Xgeva is ok to give due to pt's dental issues.  IB to Erica to update orders after going through dental paperwork.    Intravenous Access:  Implanted Port.    Treatment Conditions:  Lab Results   Component Value Date    HGB 12.2 11/03/2020     Lab Results   Component Value Date    WBC 7.2 11/03/2020      Lab Results   Component Value Date    ANEU 3.7 11/03/2020     Lab Results   Component Value Date     11/03/2020      Lab Results   Component Value Date     11/03/2020                   Lab Results   Component Value Date    POTASSIUM 4.0 11/03/2020           No results found for: MAG         Lab Results   Component Value Date    CR 0.76 11/03/2020                   Lab Results   Component Value Date    TAYLER 8.8 11/03/2020                Lab Results   Component Value Date    BILITOTAL 0.3 11/03/2020           Lab Results   Component Value Date    ALBUMIN 3.4 11/03/2020                    Lab Results   Component Value Date    ALT 94 11/03/2020           Lab Results   Component Value Date    AST 54 11/03/2020       Results reviewed, labs MET treatment parameters, ok to proceed with treatment.      Post Infusion Assessment:  Patient tolerated infusion without incident.  Blood return noted pre and post infusion.  Site patent and intact, free from redness, edema or discomfort.  No evidence of extravasations.  Access discontinued per protocol.       Discharge Plan:   Patient declined prescription refills.  Discharge instructions reviewed with: Patient.  Patient and/or family verbalized understanding of discharge instructions and all questions answered.  AVS to patient via FindMySong[employer].  Patient will return 11/24 for  next appointment.   Patient discharged in stable condition accompanied by: self.  Departure Mode: Ambulatory.    Susu Seay RN

## 2020-11-15 ENCOUNTER — NURSE TRIAGE (OUTPATIENT)
Dept: NURSING | Facility: CLINIC | Age: 65
End: 2020-11-15

## 2020-11-15 NOTE — TELEPHONE ENCOUNTER
"Daughter of pt - Shyla ( who also acts as  - Venezuelan ) calls in wanting to know how to get tested for covid     Shyla says symptoms started 2 and 1/2 weeks ago   With swollen eyes  Contacted ENT -got eye drops > got better   Then developed a dry cough > Shyla thinking again allergies ?  Eyes still remained \" itchy\"    At night time - after laying down - Shyla says pt's breathing had a whistling sound to her  - family got a air-purifier - that seemed to help     Advised daughter to keep close monitoring of resp status     Advised >   [facility]  [facility] web site     Jacque then says they are in process of setting up a virtual appt via Ensyn    Protocol and care advice reviewed  Caller states understanding of the recommended disposition  Advised to call back if further questions or concerns    Mohan Baeza RN / [facility]    Reason for Disposition    [1] Caller requesting NON-URGENT health information AND [2] PCP's office is the best resource    Protocols used: INFORMATION ONLY CALL-A-AH      "

## 2020-11-17 ENCOUNTER — VIRTUAL VISIT (OUTPATIENT)
Dept: FAMILY MEDICINE | Facility: CLINIC | Age: 65
End: 2020-11-17
Payer: COMMERCIAL

## 2020-11-17 DIAGNOSIS — Z60.3 LANGUAGE BARRIER: ICD-10-CM

## 2020-11-17 DIAGNOSIS — C50.919 METASTATIC BREAST CANCER: ICD-10-CM

## 2020-11-17 DIAGNOSIS — H10.33 ACUTE CONJUNCTIVITIS OF BOTH EYES, UNSPECIFIED ACUTE CONJUNCTIVITIS TYPE: ICD-10-CM

## 2020-11-17 DIAGNOSIS — R05.9 COUGH: ICD-10-CM

## 2020-11-17 DIAGNOSIS — Z75.8 LANGUAGE BARRIER: ICD-10-CM

## 2020-11-17 DIAGNOSIS — R06.02 SOB (SHORTNESS OF BREATH): Primary | ICD-10-CM

## 2020-11-17 PROCEDURE — 99204 OFFICE O/P NEW MOD 45 MIN: CPT | Mod: 95 | Performed by: NURSE PRACTITIONER

## 2020-11-17 RX ORDER — ALBUTEROL SULFATE 90 UG/1
2 AEROSOL, METERED RESPIRATORY (INHALATION) EVERY 6 HOURS PRN
Qty: 1 INHALER | Refills: 0 | Status: SHIPPED | OUTPATIENT
Start: 2020-11-17 | End: 2021-02-16

## 2020-11-17 RX ORDER — OLOPATADINE HYDROCHLORIDE 1 MG/ML
1 SOLUTION/ DROPS OPHTHALMIC 2 TIMES DAILY
Qty: 5 ML | Refills: 0 | Status: SHIPPED | OUTPATIENT
Start: 2020-11-17 | End: 2021-02-16

## 2020-11-17 SDOH — SOCIAL STABILITY - SOCIAL INSECURITY: ACCULTURATION DIFFICULTY: Z60.3

## 2020-11-17 NOTE — PATIENT INSTRUCTIONS
Patient Education     ?????? (???????) (Shortness of Breath (Dyspnea))  ?????? -- ??? ?????????, ????? ?? ?? ?????? ???????????? ??????? ??? ?? ????????? ??????????? ?????????? ???????. ??? ????????? ????? ?????????? ??????? (dyspnea).  ?????? ????? ???? ??????? ?????????? ?????????, ?????? ???:    ?????? ??????? ????? (asthma).    ??????????? ??????????? ??????????? ??????, ????? ??? ??????????? ??????? (chronic bronchitis) ? ???????? (emphysema).    ????????? ??????????????? (heart failure). ??? ?????????, ????? ?????????? ???????? ????????? ????? ? ?????? ???????????? ?????????? ?????????? ????????.    ???????? ?????? ??? ???????????. ????? ????? ???????? ???????? ??????? (??????????????? ?????? (hyperventilation)).    ????????? (pneumonia) ??? ???????? ? ?????? ??????.    ??????????? ????????? ???????, ?????????, ???? ??? ???????????? ????????????? ???????.    ????? ? ?????? (??????? ?????? (pulmonary embolism)). ???????? ????? ???????? ????? (??????? ?????) ? ???????? ???? ?? ???? (??????? ???????? ??? (deep vein thrombosis)), ??????? ?????????? ? ???????? ? ??????.    ????????? ??????? (heart attack) ??? ????????? ? ??????? ???? ? ????? (??????????? (angina)).    ?????? (anemia).    ??????? ??????? (collapsed lung) (???????????? (pneumothorax)).    ?????????????.    ????????????.  ?? ????? ?????? ????????? ????? ??????? ?????? ??????? ?????? ? ??? ?? ???????????. ?????????? ????? ???????? ?? ??????? ?????-???? ????????? ?????? ??? ??????. ????????, ??? ??????????? ????? ?????????????? ???????, ????? ????????, ??????? ?? ? ??? ?????-???? ????????? ????????. ????? ???????? ???????? ?? ????????? ????? ?????????, ? ????? ??????????? ????????? ?????????. ???????? ????? ???????? ? ???????????? ? ??????????? ??????????.  ???? ? ???????? ????????  ??? ??????? ? ???????? ???????? ?????????? ????????? ????????????:    ????? ???? ????????? ?????????, ????????????? ? ????? ??????? ?????.    ???? ?? ??????, ???  ?????????? ?????????? ?? ???????. ??????? ?????????? ????????? ?? ?????? ?? ??????? ??? ?????????? ?? ??????? ? ????????????? ??? ??????????? ??????????.    ?????????? ??????????? ? ???? ?????? ???????? ???????? ? ???????? ??????????? ?????????? ???.    ??? ?????????? ?????????? ?????????? ????????. ????? ???, ??? ?????????? ? ?????????? ?????????? ??????????, ???????? ??? ??????? ? ??? ?????? ??????????? ???????, ??????????????????? ? ??????.    ????????? ??????????? ??????? ? ?????? ????????????.  ??????????? ??????????  ??????? ?? ????????? ?????, ??????????? ????? ??????, ??? ? ???????????? ? ??????????? ??????????.  ???? ???? ??????? ???????, ??? ??????? ? ??????, ???? ? ???? ??????? ??????????? ?????? ?????????. ??? ????????? ??????????? ????????? ? ???????????? ? ??????????? ??????????.  (??????????.???? ??? ??? ?????? ??????? (X-ray), ? ??? ???????????? ??????????? ??????????. ??? ??????? ? ???????????, ??????? ????? ???????? ?? ???? ???????.  ??????? ?? ?????? 911 ??? ????????? ?????????? ??????????? ??????  ?????? ????? ???? ????????? ????????? ??????????? ????????. ????????, ??? ????? ???? ??????????? ?????? ??? ??????. ????????? ?? ?????? 911, ???? ?????? ????????????, ???????? ???? ??? ?????????????? ????? ?? ?????????, ????????????? ????:    ? ??? ??????????? ??????????? ???????? ??? ??? ?????? ?????????.    ?? ??????? ????????.    ? ??? ????????? ??? ???????????? ????????????.    ?? ???????????? ?????.    ?? ???????? ???? ? ?????, ????, ?????, ??? ??? ? ??????? ????? ?????.    ??? ??????? ? ???.  ????? ?????????? ?????????? ?? ??????????? ???????  ? ????????? ??????? ?????????? ??????????????? ?????????? ? ????????????? ??? ??????????? ??????????:    ??? ?? ??????? ??????? ??? ? ??? ????????? ???????    ???????????, ???? ??? ???? ? ????, ???? ??? ?????? ????? ????    ???? ????? ??? ??? ???????    ??????? ????? ????    ?????????????? ??? ????????    ??????????? 38  C (100,4  F) ? ???? ??? ?  ???????????? ? ?????????? ?????? ???????? ?????.    [id number] ??. [employer] [address], [place] [address]. ??? ????? ????????. ?????? ?????????? ?? ???????? ??????? ???????????????? ??????????? ??????. ?????? ?????????? ???????? ?????? ?????.           Patient Education     ??? ????? ?????????????? ??????? 2019 (COVID-19) ?????? ? ???? ? ???????  ???? ? ??? ??? ? ????? ????? ???? ???????? COVID-19, ???????? ??????????? ???? ??????????? ??? ?????????????? ??????????????? ?????? ? ?????????? ?????????.   ???? ?? ???????????? ? ???? ???????? COVID-19    ??????????? ????. ????????? ?????? ???????? ????? ? ???????, ??? ? ??? ???? ???????? COVID-19. ???????? ???, ?????? ??? ???? ? ????? ???????? ??? ???????. ???????? ??????????? ?????? ???????? ?????. ??? ????? ???????????? ??????????? ???? ????. ??? ?????????? ?????????????.    ?? ?????????. ?????? ? ????, ??? ???????? ???????? ????? ???????? ? ?????? ???????.    ???????????? ?? ????????? ??????, ????? ? ???????????? ????. ?????????? ?????????? ??????? ? ??????? ?????. ?????????? ????? ??????. ?? ????????? ? ?? ???????? ??????? ??? ????. ???????? ???????????, ? ??????? ?? ???????????, ??????????????? ?????????. ??? ??????? ????????????? ??????????????? ??????.    ???? ??? ????? ??????? ??? ??????, ??????? ??? ? ??????. ????? ???????????? ?????? ? ?????. ???? ? ??? ??? ??????, ???????? ??? ??????? ? ????? ?????.    ?? ???????? ???? ??? ??????? ?????? ? ???????????. ??? ???????? ????? ?????, ??? ???????? ???????, ????????? ? ?????????? ?????.    ????? ?????????? ????? ?????, ?????? ?? ???? ?????. ?? ????? ???????????? ???????? ? ??????? ????????????? ??????????????? ??????????? ????? ????? ???? ??????????????? ??? ?????????? ???????????????. ????????, ??? ???????? ??????? ??????????? ????? ??? ???? ?? ?????. ?? ?????? ??????? ?? ?? ???????, ???????? ??? ?????? ????. ? CDC ???? ?????????? ? ???, ??? ?????????? ????? ??? ????. ?????? ????? ???,  ????? ??? ????????? ??? ? ???.    ???? ??? ????? ????? ? ???????? ??? ???????, ????????, ??? ??????????? ???????? ????? ?????? ???????? ????????, ????? ??? ?????, ???????? ???????, ???????? ? ???????? ?????? ????. ?? ?????? ???? ???????? ? ????????? ??????. ??? ???????? ??? ?????????????? ?????????? ??????????????? ??????.    ?????????? ???????????? ????????? ? ???????? ????????. ??? ???????? ? ???? ??????? ??????? ?? ???????????? ??????????. ????????? ????? ???????????? ????? ?????? ?????, ? ???????? ?? ????????.    ???????? ???? ???????????, ??????? ???? ??? ??????????? ????????.    ???? ??? ??? ????????? ???????  COVID-19     ??????????? ???? ? ????????? ????????????. ?? ???????? ?? ????, ???? ?????? ??? ?? ????????? ??????????? ??????. ?? ?????? ?? ??????, ? ????? ??? ???????????? ?????. ?? ??????????? ???????????? ????????? ??? ?????.    ???????? ???? ????????? ?????? ???????? ?????. ????????? ? ??????? ?????? ???????? ?????, ?????? ??? ???? ? ????. ??? ????? ??????????? ? ???? ??? ??????????. ??? ??????? ????????????? ??????????????? ??????.    ???? ??? ????? ????? ? ???????? ??? ???????, ????????, ??? ??????????? ???????? ????? ?????? ???????? ????????, ????? ??? ?????, ???????? ???????, ???????? ? ???????? ?????? ????. ?? ?????? ???? ???????? ? ????????? ??????. ??? ???????? ??? ?????????????? ?????????? ??????????????? ??????.    ?????? ????? ??? ????. ??? ??????? ???????? ?????? ????? ?? ????? ????????. ???? ?? ?? ?????? ?????? ?????, ??? ?????? ?????? ??, ??? ????????? ?? ????. ?? ????? ???????????? ???????? ? ??????? ????????????? ??????????????? ??????????? ????? ????? ???? ??????????????? ??? ?????????? ???????????????. ????????, ??? ???????? ??????? ??????????? ????? ??? ???? ?? ?????. ?? ?????? ??????? ?? ?? ???????, ???????? ??? ?????? ????. ? CDC ???? ?????????? ? ???, ??? ?????????? ????? ??? ????. ?????? ????? ???, ????? ??? ????????? ??? ? ???.    ????????? ???????? ?? ?????? ????? ? ?????  ????.    ?????????? ???????? ? ????????? ? ??????? ?????????.    ?? ???????? ???? ??? ??????? ?????? ? ???????????. ??? ???????? ????? ?????, ??? ???????? ???????, ????????? ? ?????????? ?????.    ???? ??? ????? ??????? ??? ??????, ??????? ??? ? ??????. ????? ???????????? ?????? ? ?????. ???? ? ??? ??? ??????, ???????? ??? ??????? ? ????? ?????.    ????? ???? ?????.    ???? ?? ????? ? ???????? ????????   ? ????????? ????? ??? ??????????????? ????????? ??? ?????? ??? ??????? ?? ??????. ????????? ????????????????? ? ?????? ????????? ???????? ???????????? ? COVID-19. ?????? ?????????, ???????????? ??? ??????? ?????? ???????????, ??????????????? ?? ??????? ????????????? ? ?????? ? COVID-19, ?? ? ????????? ????? ??? ?? ???????? ??? ??? ???????.   ??????? ? ????????? ????? ??????? ? ?????? ?????? ????????? ? ?????? ? ???????. ??? ?????????? ?????????????? ????????. ???????????? ? ???? ????:     ?????????. ??? ???????? ?????? ???? ???????? ? ????????.    ????? ??????? ?????????? ????????. ???????? ????? - ?????? ?????? ????????????? ?????????????. ?????????? ???????? ?? ????? 6-8 ???????? ???????? ? ???? ??? ???????, ??????? ??????????? ????. ???????? ? ?????? ?????, ????? ???????? ??? ?????. ?? ????? ????????, ?????????? ?????? ??? ????????.    ?????????? ??????????? ??? ??????? (OTC) ?????????????? ?????????. ??? ???????????? ??? ?????????? ???? ? ???????? ???????????. ?????????? ??????????? ??? ??????? (OTC) ????????? ? ???????????? ? ?????????? ?????.  ???? ?? ???? ? ???????? ? ??????????? ??? ?????????????? ??????? COVID-19 ? ?????? ?????????? ????, ???????? ???? ????????? ????? ??????????? ???????. ???? ????????? ??????, ????? ????? ?????????? ????????????. ?? ????? ?????? ???????? ???????? ?? ????????? ?????????, ????? ????????? ???????, ????????, ?????? ?? ?????? (????????? ????).   ???? ? ??? ??????????? ??????? COVID-19, ???? ??????????? ??????? ????? ????????? ??? ??????????? ??????????? ????????? ????? ??????.  ??? ?????????? ?????????? ?????? COVID-19. ?????? ?? ?????, ????????? ????????????? ?? COVID-19, ????? ????????? ????????, ?????????? ???????? ? COVID-19 ? ?????, ??????? ? ????????? ????? ???????? ?????? ???? ????????. ???????? ?? ????? ? ???????????? ?????? ???????????????? ?? COVID-19 ? ? ???, ????????? ?????? ??? ????????. ???????????? ????????????. FDA ???????? ?? ??? ??????????? ????????????? ? ????????? ????? ? ??????? ??? ??????? ??? ????? ???????????? COVID-19.   ???? ?? ??????? ?????????     ???????? ???? ????????? ???????????? ?????????.    ????? ???? ?????.    ?????? ???????? ?????? ? ???????????? ? ??????????????.    ?????????, ??? ??????? ????? ?????. ???? ?? ?? ????? ?????? ?????, ?? ??????????? ? ??? ? ????? ???????. ???? ?? ?????? ???? ? ????? ???????, ?????? ????? ??? ????. ?????? ????? ???, ????? ??? ????????? ??? ? ???.    ??????? ?? ?????????? ????????.    ????? ????????? ??????? ???????? ???????????? ??????????????? ?????????. ???? ?????? ????????, ???????? ??????, ????? ?????? ????????????, ??????????? ??????? ? ??????.    ?? ?????????? ?????? ???????? ?????????? ????????? ??????? ? ??????? ?????????. ??? ???????? ? ???? ?????? ??? ??? ? ?????, ?????????, ???????? ??? ??????.    ????????? ???????? ????? ? ?????.    ??????? ?????? ????? ? ???????? ???????? ???????? ?? ????????.    ???????, ????? ??? ????? ????? ?????????? ????????????  ???? ? ??? COVID-19, ?????????? ????????? ???????? ?? ?????. ??? ?????????? ?????????????.  ???? ??????????? ????? ???????, ???? ?? ?????????? COVID-19 ? ??????? ????????? 3 ???????, ?? ????????? ??????????? ??? ?????????, ? ???? ?? ?????????????? ? ???-?? ? COVID-19. ???? ? ??? ??? ?????????, ??? ?? ????? ?????????? ???? ?? ???????????? ??? ????????? ????????? ????????????. CDC ?? ??????????? ????????? ????????????, ???? ? ??? ??? ????????? COVID-19 ? ???? ????? ???????? ?? ????? ???? ??????? ? ?????? ????????????. ????????? ? ????? ??????, ???? ? ??? ????  ?????-???? ???????. ???? ? ??? ?????????? (???????????) ????????, ??????????? ????. ???? ?? ?????????? COVID-19 ????? 3 ??????? ????? ? ????? ??????????? ?????????, ?????????? ? ????? ???, ??? ????? ? ??? ??????? ?? ???? COVID-19, ? ??????????? ????, ?????????? ???????? ? ??????? ??????, ????????? ?????? ????? ? ??????? ?? ??????????.   ???? ?? ?????? ???? ??????????, CDC ?? ??????????? ???????? ????????? ???????????? ?? COVID-19 ? ??????? ?????? ?? ??????????. ??? ????? ????? ?????????? ????????????, ????? ???????????? 3 ????????? ????????.   1. ? ??????? ??? ??????? 24 ????? ? ??? ?? ?????????? ???????????. ??? ????????, ?? ? ??????? ??? ??????? 24 ????? ?? ????????? ????????? ??????????? ?????????, ????? ??? ????????????, ? ? ??? ?? ??? ??? ????? ?? ?????????? ???????????.  2. ???? ????????, ????? ??? ?????? ??? ???????????? ???????, ???????.  3. ? ??????? ????????? ?????? ????????? ?????? ?? ????? 10 ????.  ?????????? ?? ????? ??????? ??????, ?????? ??? ????? ?? ?????. ??????? ?????, ????????????? ?? ? ??? 3 ???? ????????. ???? ????? ????????? ??? ????? ?? ?????. ? ????????? ??????? ??? ???? ??? ????? ????? ?????? ?????????? ??????. ??? ??????? ???? ????? ?????????? ??? ??????.    ???? ? ??? ?????? ???????? ??????? ? COVID-19 ??? ???? COVID-19 ????????? ? ??? ??????, ???? ???????? ?????? ????, ????? ?????????? ????????, ????? ????????? ??????????. ????????? ??????????? ? ??????? ??????? ????? ??????? ?????????? ???????? ???????. ? ??? ????????? ??????? ?? ????, ????????? ???????? ????? ??? ??????? ? ????? ???????????, ??? ??? ??? ?????? ????????? ???????? ???????. ???????? ????????? ?????? ???????? ????? ?????? ????, ??? ????????????? ? ????? ????? ?????????? ????????. ??? ????? ???????????? ???????? ???? ???????? ? ?????? ????????? ???????????? ?? COVID-19. ?????? ?????, ??? ?????? ?????????? ? ???????? ????????, ???? ??? 3 ??????? ?? ????? ?????????:   1. ? ??? ??? ?????????? ??????????? ??? ??????  ?????????????? ????????.  2. ???? ??????????? ????????, ????? ??? ?????? ? ??????, ???????.  3. ? ??? 2 ????????????? ????? ?? ?????????? ?? COVID-19, ??????? ???? ????? ? ?????????? ?? ????? 24 ?????. ???? ????? ??????????, ??? ????, ?????? ?????, ?????? ??? ????????? ????????? ?? ???????? ??? ???????? ?????. ???????? ????????? ?????? ????? ?????? ????, ??? ????????????? ? ????? ????? ?????????? ????????.  ????? ?? ????????? ???????????? ?????  ????? ?? ?????? ?????????? ???????, ????? ????? ?? ????, ??????? ?? ???????? ???????????? CDC ?????????? ????? ?? ????? ??? ????:       CDC ??????????? ???? ????? ?????? 2 ??? ?????? ????? ??? ???? ?? ????? ? ???????????? ??????, ????? ??? ????????? ??? ????, ???????? ? ??? ???????, ????? ?????? ????????????????. ????????, ????????? ????? ? ?????? ??????, ????? ??? ???????????? ?????????, ????????? ????? ???????? ? ???????, ? ????? ? ??????????? ?????????, ????? ? ??????????.    ????? ?? ????? ????? ?????? ????????????? ??????????????? ?????? COVID-19.    ????? ?? ?????, ?????? ?????, ???????? ??????????????? COVID-19, ?????? ?????? ????????????? ??????, ???????????? ? ???????????? ??????.    ????????? ???? ?? ?????? ?????? ????? ??? ????. ???:     ???? ?????? 2 ???    ????? ??????? ? ?????????? ??? ??????????? ????????????, ??? ??????? ??????? ????? ????? ????????? ??? ???? ???????    ?????, ??? ????????? ??? ???????? ??? ?????????? ????? ????? ??? ??????????? ??????. ??. ??????????? CDC ? ???, ??? ?? ?????? ?????? ????? ??? ????.    ????? ?????????? ????????? ? ??????? ??????  ?????????? ??????????????? ?????????? ? ?????, ???? ? ???????? ????????? ????????:    ???????????? ???????    ???? ??? ???????? ? ?????  ???? ? ???????? ???? ??? ????????, ??????? 911:    ?????? ?????????? ??? ???????    ???? ??? ???????? ? ????? ???????????    ?????????? ????? ??????? ??? ??? ????    ????????? ??? ???????????? ????????????    ??????????? ???????? ??? ????????? ???  ???????????    ??????? ??? ?????? ????????    ?????? ? ??????  ??????????? ????? ?? ????????   ???? ??? ????????? ??????? COVID-19 ? ??????? ???????? ?? ????????:    ???????? ??????????? ???? ??? ????? ?? ????? ? ????????.    ???????? ??????????? ???????????? ????????? ????????.    ????????? ???????, ???? ??? ???-?? ?????????. ???????? ??????, ????? ?? ?? ???????.  ???? ?????????? ?????????: 24 ??????? 2020 ????    [id number] [employer]. [address] [place] [address], [place] [address]. All rights reserved. This information is not intended as a substitute for professional medical care. Always follow your healthcare professional's instructions.           Patient Education     ????????????, ???????? [Viral Conjunctivitis]  ???????? ???????????? (conjunctivitis, ?????? ???????? -  pink eye ) - ??? ???????????????? ???????? ????. ?????? ???????? ????? ???????. ?????????? ??? ????????????? ? ?????, ? ????? - ? ??????? ????????. ????? ?? ????? ???????? ????? ???????????????? ? ?????? ????? ?? ??????. ???????? ??????????? ???????? - ???????????, ????????? ?? ?????, ????????? ??? ? ???????? ???? ? ??????.  ??????????? ???????? ????? 7-10 ????. ??????? ????? ? ???????????? ?? ?????????? ?????, ?? ????? ???? ????????? ??? ?????????????? ????????? ????????????? ????????.  ???????? ????  1. ????????????? ? ???????? ????? ?????????, ????????? ? ?????? ????, 3-4 ???? ? ???? ( ??????????????? ????? ??????????? ???????? ?????????) .  2. ????? ?????? ?????????? ????????? ????? ?? ?????, ??-?? ???? ? ???? ?? ????? ?????????? ???????. ???????? ?? ? ??????? ????????, ????????? ?????? ?????.  3. ?????????? ??????????? ??????????? ? ???????????? ? ?????????? ????? .  4. ??? ?????????????? ??????????????? ???????? ?? ???????? ???? ? ?? ?????????? ????? ???? ?? ? ????? ???? , ??? ???????????? ? ???????? ?????.  5. ??????????? ?????? ??????? ???????????, ????????? ? ?????????, ????????? ???????? ????? ???????????????? ???  ????????????? ?????? ????????? ??????? ??????.  6. ???????? ???????????? ??????? ? ??????? ?????? ??????, ??????? ???? ? ?????? ???????????? ?? ?????? ???????? ??????? ??? ? ?????, ???? ?? ??????? ??????????? .  ?????????? ?????? ??????????  ? ?????? ????? ??? ? ?????? ??????????? ?????????? ???????? ?????????? ????????? ??? ? ??? ??????, ???? ? ??????? 5 ???? ?? ????????? ????????? .  ??????????????? ?????????? ?? ??????????? ???????  ??? ????? ?? ????????????? ???? ?????????:  -- ????????? ?????? ;  -- ????????????? ???? ? ????? ;  -- ????????????? ????????? ??? ??????????? ???? ;  -- ??????????????? ??????????? ?? ??????? ?????? ????? ;  -- ??????? ?????????? ????????? ?? ????? ??????? ??? ???????? ????? ;  -- ???????? ???????? ???? ?????? ??? ?????? ?????;  -- ??????????? ???? 100,0  F (37,8  C) ??? ???????? ????????? ??? ???? 101,0  F (38,3  C) ??? ?????????? ?????????.    [id number] ??. [employer] [address], [place] [address]. ??? ????? ????????. ?????? ?????????? ?? ???????? ??????? ???????????????? ??????????? ??????. ?????? ?????????? ???????? ?????? ?????.

## 2020-11-17 NOTE — PROGRESS NOTES
"Hoda Brush is a 65 year old female who is being evaluated via a billable video visit.      The patient has been notified of following:     \"This video visit will be conducted via a call between you and your physician/provider. We have found that certain health care needs can be provided without the need for an in-person physical exam.  This service lets us provide the care you need with a video conversation.  If a prescription is necessary we can send it directly to your pharmacy.  If lab work is needed we can place an order for that and you can then stop by our lab to have the test done at a later time.    Video visits are billed at different rates depending on your insurance coverage.  Please reach out to your insurance provider with any questions.    If during the course of the call the physician/provider feels a video visit is not appropriate, you will not be charged for this service.\"    Patient has given verbal consent for Video visit? Yes  How would you like to obtain your AVS? MyChart  If you are dropped from the video visit, the video invite should be resent to: Other e-mail: [employer] only  Will anyone else be joining your video visit? No    Subjective     Hoda Brush is a 65 year old female who presents today via video visit for the following health issues:    HPI     Eye(s) Problem  Onset/Duration: both eyes but mostly right eye started 2-3 weeks ago-itching and burning  Description:   Location: bilateral eyes  Pain: no  Redness: no  Accompanying Signs & Symptoms:  Discharge/mattering: no  Swelling: YES  Visual changes: no  Fever: no  Nasal Congestion: no  Bothered by bright lights: no  History:  Trauma: no  Foreign body exposure: no  Wearing contacts: no wears glasses  Precipitating or alleviating factors: None  Therapies tried and outcome: warm compress     drops for a couple weeks  Tried zevitor antihistamine drops, never had allergies before   Eye exam Spet wasn ormal   Breathing " difficulties when walking   When lying to sitting feels wheezing  Gets lab work  Every 3 months due to on chemo    History of breast cancer with mets to the bone     pt denies history of allergies and ashtma, did see on chart she had used inhaler before which could help with wheezing    Denies heart history or increased edema-history in arm unchanged after breast cancer     Video Start Time: 1:40 PM        Review of Systems   Constitutional, eye, ENT, skin, breast, respiratory, cardiac, GI, , MSK, neuro, psych, and allergy are normal except as otherwise noted.        Objective           Vitals:  No vitals were obtained today due to virtual visit.    Physical Exam     GENERAL: Healthy, alert and no distress  EYES: Eyes grossly normal to inspection.  No discharge or erythema, or obvious scleral/conjunctival abnormalities.  RESP: No audible wheeze, cough, or visible cyanosis.  No visible retractions or increased work of breathing.    SKIN: Visible skin clear. No significant rash, abnormal pigmentation or lesions.  NEURO: Cranial nerves grossly intact.  Mentation and speech appropriate for age.  PSYCH: Mentation appears normal, affect normal/bright, judgement and insight intact, normal speech and appearance well-groomed.              Assessment & Plan       ICD-10-CM    1. SOB (shortness of breath)  R06.02 Symptomatic COVID-19 Virus (Coronavirus) by PCR   2. Acute conjunctivitis of both eyes, unspecified acute conjunctivitis type  H10.33 Symptomatic COVID-19 Virus (Coronavirus) by PCR     olopatadine (PATANOL) 0.1 % ophthalmic solution   3. Cough  R05 albuterol (PROAIR HFA/PROVENTIL HFA/VENTOLIN HFA) 108 (90 Base) MCG/ACT inhaler   4. Language barrier  Z78.9    will test for COVID, trial eye drop as differential dry eyes, viral, does not appear bacterial on video needed to swithc to phone due to pt difficulties with  on video   Trial eye drop for comfort    See patient instructions for what we reviewed with  "interpeter     BMI:   Estimated body mass index is 31.84 kg/m  as calculated from the following:    Height as of 7/20/20: 1.6 m (5' 2.99\").    Weight as of 11/3/20: 81.5 kg (179 lb 11.2 oz).   Weight management plan: Patient was referred to their PCP to discuss a diet and exercise plan.         See Patient Instructions    No follow-ups on file.    KATHI Peña CNP  [facility]      Video-Visit Details    Type of service:  Video Visit    Video End Time:2:10 PM    Originating Location (pt. Location): Home    Distant Location (provider location):  [facility]     Platform used for Video Visit: Nidia        "

## 2020-11-20 DIAGNOSIS — R06.02 SOB (SHORTNESS OF BREATH): ICD-10-CM

## 2020-11-20 DIAGNOSIS — H10.33 ACUTE CONJUNCTIVITIS OF BOTH EYES, UNSPECIFIED ACUTE CONJUNCTIVITIS TYPE: ICD-10-CM

## 2020-11-20 PROCEDURE — U0003 INFECTIOUS AGENT DETECTION BY NUCLEIC ACID (DNA OR RNA); SEVERE ACUTE RESPIRATORY SYNDROME CORONAVIRUS 2 (SARS-COV-2) (CORONAVIRUS DISEASE [COVID-19]), AMPLIFIED PROBE TECHNIQUE, MAKING USE OF HIGH THROUGHPUT TECHNOLOGIES AS DESCRIBED BY CMS-2020-01-R: HCPCS | Performed by: NURSE PRACTITIONER

## 2020-11-21 LAB
SARS-COV-2 RNA SPEC QL NAA+PROBE: NOT DETECTED
SPECIMEN SOURCE: NORMAL

## 2020-11-24 ENCOUNTER — INFUSION THERAPY VISIT (OUTPATIENT)
Dept: ONCOLOGY | Facility: CLINIC | Age: 65
End: 2020-11-24
Attending: INTERNAL MEDICINE
Payer: COMMERCIAL

## 2020-11-24 VITALS
TEMPERATURE: 98.2 F | RESPIRATION RATE: 14 BRPM | SYSTOLIC BLOOD PRESSURE: 115 MMHG | DIASTOLIC BLOOD PRESSURE: 78 MMHG | OXYGEN SATURATION: 98 % | BODY MASS INDEX: 32.11 KG/M2 | HEART RATE: 94 BPM | WEIGHT: 181.2 LBS

## 2020-11-24 DIAGNOSIS — K29.00 ACUTE SUPERFICIAL GASTRITIS WITHOUT HEMORRHAGE: ICD-10-CM

## 2020-11-24 DIAGNOSIS — C50.912 MALIGNANT NEOPLASM OF LEFT FEMALE BREAST, UNSPECIFIED ESTROGEN RECEPTOR STATUS, UNSPECIFIED SITE OF BREAST (H): Primary | ICD-10-CM

## 2020-11-24 LAB
ALBUMIN SERPL-MCNC: 3.2 G/DL (ref 3.4–5)
ALP SERPL-CCNC: 73 U/L (ref 40–150)
ALT SERPL W P-5'-P-CCNC: 84 U/L (ref 0–50)
ANION GAP SERPL CALCULATED.3IONS-SCNC: 5 MMOL/L (ref 3–14)
AST SERPL W P-5'-P-CCNC: 53 U/L (ref 0–45)
BASOPHILS # BLD AUTO: 0 10E9/L (ref 0–0.2)
BASOPHILS NFR BLD AUTO: 0.5 %
BILIRUB SERPL-MCNC: 0.2 MG/DL (ref 0.2–1.3)
BUN SERPL-MCNC: 16 MG/DL (ref 7–30)
CALCIUM SERPL-MCNC: 9 MG/DL (ref 8.5–10.1)
CANCER AG27-29 SERPL-ACNC: 10 U/ML (ref 0–39)
CEA SERPL-MCNC: <0.5 UG/L (ref 0–2.5)
CHLORIDE SERPL-SCNC: 108 MMOL/L (ref 94–109)
CO2 SERPL-SCNC: 29 MMOL/L (ref 20–32)
CREAT SERPL-MCNC: 0.77 MG/DL (ref 0.52–1.04)
DIFFERENTIAL METHOD BLD: NORMAL
EOSINOPHIL # BLD AUTO: 0.6 10E9/L (ref 0–0.7)
EOSINOPHIL NFR BLD AUTO: 7 %
ERYTHROCYTE [DISTWIDTH] IN BLOOD BY AUTOMATED COUNT: 13.5 % (ref 10–15)
GFR SERPL CREATININE-BSD FRML MDRD: 81 ML/MIN/{1.73_M2}
GLUCOSE SERPL-MCNC: 84 MG/DL (ref 70–99)
HCT VFR BLD AUTO: 37.8 % (ref 35–47)
HGB BLD-MCNC: 12 G/DL (ref 11.7–15.7)
IMM GRANULOCYTES # BLD: 0 10E9/L (ref 0–0.4)
IMM GRANULOCYTES NFR BLD: 0.2 %
LYMPHOCYTES # BLD AUTO: 2.8 10E9/L (ref 0.8–5.3)
LYMPHOCYTES NFR BLD AUTO: 34.3 %
MCH RBC QN AUTO: 29.5 PG (ref 26.5–33)
MCHC RBC AUTO-ENTMCNC: 31.7 G/DL (ref 31.5–36.5)
MCV RBC AUTO: 93 FL (ref 78–100)
MONOCYTES # BLD AUTO: 0.5 10E9/L (ref 0–1.3)
MONOCYTES NFR BLD AUTO: 6.2 %
NEUTROPHILS # BLD AUTO: 4.2 10E9/L (ref 1.6–8.3)
NEUTROPHILS NFR BLD AUTO: 51.8 %
NRBC # BLD AUTO: 0 10*3/UL
NRBC BLD AUTO-RTO: 0 /100
PLATELET # BLD AUTO: 236 10E9/L (ref 150–450)
POTASSIUM SERPL-SCNC: 3.6 MMOL/L (ref 3.4–5.3)
PROT SERPL-MCNC: 7.8 G/DL (ref 6.8–8.8)
RBC # BLD AUTO: 4.07 10E12/L (ref 3.8–5.2)
SODIUM SERPL-SCNC: 141 MMOL/L (ref 133–144)
WBC # BLD AUTO: 8.1 10E9/L (ref 4–11)

## 2020-11-24 PROCEDURE — 250N000011 HC RX IP 250 OP 636: Performed by: PHYSICIAN ASSISTANT

## 2020-11-24 PROCEDURE — 99207 PR NO BILLABLE SERVICE THIS VISIT: CPT

## 2020-11-24 PROCEDURE — 86300 IMMUNOASSAY TUMOR CA 15-3: CPT | Performed by: PHYSICIAN ASSISTANT

## 2020-11-24 PROCEDURE — 80053 COMPREHEN METABOLIC PANEL: CPT | Performed by: PHYSICIAN ASSISTANT

## 2020-11-24 PROCEDURE — 250N000011 HC RX IP 250 OP 636: Performed by: INTERNAL MEDICINE

## 2020-11-24 PROCEDURE — 96413 CHEMO IV INFUSION 1 HR: CPT

## 2020-11-24 PROCEDURE — 258N000003 HC RX IP 258 OP 636: Performed by: PHYSICIAN ASSISTANT

## 2020-11-24 PROCEDURE — 85025 COMPLETE CBC W/AUTO DIFF WBC: CPT | Performed by: PHYSICIAN ASSISTANT

## 2020-11-24 PROCEDURE — 82378 CARCINOEMBRYONIC ANTIGEN: CPT | Performed by: PHYSICIAN ASSISTANT

## 2020-11-24 RX ORDER — HEPARIN SODIUM (PORCINE) LOCK FLUSH IV SOLN 100 UNIT/ML 100 UNIT/ML
5 SOLUTION INTRAVENOUS ONCE
Status: COMPLETED | OUTPATIENT
Start: 2020-11-24 | End: 2020-11-24

## 2020-11-24 RX ORDER — HEPARIN SODIUM (PORCINE) LOCK FLUSH IV SOLN 100 UNIT/ML 100 UNIT/ML
5 SOLUTION INTRAVENOUS EVERY 8 HOURS
Status: DISCONTINUED | OUTPATIENT
Start: 2020-11-24 | End: 2020-11-24 | Stop reason: HOSPADM

## 2020-11-24 RX ORDER — NICOTINE POLACRILEX 4 MG/1
20 GUM, CHEWING ORAL DAILY
Qty: 90 TABLET | Refills: 2 | Status: SHIPPED | OUTPATIENT
Start: 2020-11-24 | End: 2021-03-30

## 2020-11-24 RX ADMIN — Medication 5 ML: at 16:25

## 2020-11-24 RX ADMIN — TRASTUZUMAB 450 MG: 150 INJECTION, POWDER, LYOPHILIZED, FOR SOLUTION INTRAVENOUS at 15:53

## 2020-11-24 RX ADMIN — Medication 5 ML: at 14:47

## 2020-11-24 ASSESSMENT — PAIN SCALES - GENERAL: PAINLEVEL: NO PAIN (0)

## 2020-11-24 NOTE — PROGRESS NOTES
Infusion Nursing Note:  Hoda Brush presents today for C55D1 Herceptin.    Patient seen by provider today: No   present during visit today: Not Applicable.    Note: Patient reports feeling well. Denies fever/chills. Dnies nausea/vomiting nor chest  And abdominal discomfort. No new complaints made. Otherwise well.    Notes copied from provider visit 11/3/20.   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 This was biopsy proven. Note from oral surgery was scanned in chart. Unsure if she is cleared from oral surgery, there are over 40 pages of scanned notes.  For now will hold.     Intravenous Access:  Implanted Port.    Treatment Conditions:  Lab Results   Component Value Date    HGB 12.0 11/24/2020     Lab Results   Component Value Date    WBC 8.1 11/24/2020      Lab Results   Component Value Date    ANEU 4.2 11/24/2020     Lab Results   Component Value Date     11/24/2020      Lab Results   Component Value Date     11/24/2020                   Lab Results   Component Value Date    POTASSIUM 3.6 11/24/2020           No results found for: MAG         Lab Results   Component Value Date    CR 0.77 11/24/2020                   Lab Results   Component Value Date    TAYLER 9.0 11/24/2020                Lab Results   Component Value Date    BILITOTAL 0.2 11/24/2020           Lab Results   Component Value Date    ALBUMIN 3.2 11/24/2020                    Lab Results   Component Value Date    ALT 84 11/24/2020           Lab Results   Component Value Date    AST 53 11/24/2020       Results reviewed, labs MET treatment parameters, ok to proceed with treatment.  ECHO/MUGA completed 11/3/20  EF 55-60%.      Post Infusion Assessment:  Patient tolerated infusion without incident.  Blood return noted pre and post infusion.  Site patent and intact, free from redness, edema or discomfort.  No evidence of extravasations.  Access discontinued per protocol.       Discharge Plan:    Patient declined prescription refills.  Discharge instructions reviewed with: Patient.  Patient and/or family verbalized understanding of discharge instructions and all questions answered.  AVS to patient via [employer].  Patient will return 12/15/20 for next appointment.   Patient discharged in stable condition accompanied by: self.  Departure Mode: Ambulatory and Wheelchair.  Face to Face time: 10.    EMILY WICK RN

## 2020-11-27 ENCOUNTER — ANCILLARY PROCEDURE (OUTPATIENT)
Dept: GENERAL RADIOLOGY | Facility: CLINIC | Age: 65
End: 2020-11-27
Attending: PHYSICIAN ASSISTANT
Payer: COMMERCIAL

## 2020-11-27 ENCOUNTER — MYC MEDICAL ADVICE (OUTPATIENT)
Dept: FAMILY MEDICINE | Facility: CLINIC | Age: 65
End: 2020-11-27

## 2020-11-27 ENCOUNTER — MYC REFILL (OUTPATIENT)
Dept: FAMILY MEDICINE | Facility: CLINIC | Age: 65
End: 2020-11-27

## 2020-11-27 ENCOUNTER — OFFICE VISIT (OUTPATIENT)
Dept: URGENT CARE | Facility: URGENT CARE | Age: 65
End: 2020-11-27
Payer: COMMERCIAL

## 2020-11-27 VITALS
HEART RATE: 92 BPM | SYSTOLIC BLOOD PRESSURE: 122 MMHG | RESPIRATION RATE: 22 BRPM | DIASTOLIC BLOOD PRESSURE: 74 MMHG | OXYGEN SATURATION: 96 % | TEMPERATURE: 98.9 F

## 2020-11-27 DIAGNOSIS — R05.9 COUGH: ICD-10-CM

## 2020-11-27 DIAGNOSIS — J40 BRONCHITIS: Primary | ICD-10-CM

## 2020-11-27 PROCEDURE — 99214 OFFICE O/P EST MOD 30 MIN: CPT | Performed by: PHYSICIAN ASSISTANT

## 2020-11-27 PROCEDURE — 71046 X-RAY EXAM CHEST 2 VIEWS: CPT | Performed by: RADIOLOGY

## 2020-11-27 RX ORDER — AZITHROMYCIN 250 MG/1
TABLET, FILM COATED ORAL
Qty: 6 TABLET | Refills: 0 | Status: SHIPPED | OUTPATIENT
Start: 2020-11-27 | End: 2020-11-27

## 2020-11-27 RX ORDER — AZITHROMYCIN 250 MG/1
TABLET, FILM COATED ORAL
Qty: 6 TABLET | Refills: 0 | Status: SHIPPED | OUTPATIENT
Start: 2020-11-27 | End: 2020-12-02

## 2020-11-27 RX ORDER — PREDNISONE 20 MG/1
20 TABLET ORAL 2 TIMES DAILY
Qty: 10 TABLET | Refills: 0 | Status: SHIPPED | OUTPATIENT
Start: 2020-11-27 | End: 2020-12-15

## 2020-11-27 RX ORDER — ALBUTEROL SULFATE 90 UG/1
2 AEROSOL, METERED RESPIRATORY (INHALATION) EVERY 6 HOURS PRN
Qty: 1 INHALER | Refills: 0 | Status: CANCELLED | OUTPATIENT
Start: 2020-11-27

## 2020-11-27 RX ORDER — PREDNISONE 20 MG/1
20 TABLET ORAL 2 TIMES DAILY
Qty: 10 TABLET | Refills: 0 | Status: SHIPPED | OUTPATIENT
Start: 2020-11-27 | End: 2020-11-27

## 2020-11-27 ASSESSMENT — ENCOUNTER SYMPTOMS
WHEEZING: 1
SHORTNESS OF BREATH: 1
EYES NEGATIVE: 1
CONSTITUTIONAL NEGATIVE: 1
APNEA: 0
CARDIOVASCULAR NEGATIVE: 1
NEUROLOGICAL NEGATIVE: 1
MUSCULOSKELETAL NEGATIVE: 1
STRIDOR: 0
GASTROINTESTINAL NEGATIVE: 1
CHOKING: 0
CHEST TIGHTNESS: 0
PSYCHIATRIC NEGATIVE: 1
COUGH: 1

## 2020-11-27 NOTE — TELEPHONE ENCOUNTER
Sent message in my chart to patient. Too early to fill, and has urgent care appt today.     Irais Louise RN   [facility]

## 2020-11-27 NOTE — PROGRESS NOTES
SUBJECTIVE:   Hoda Brush is a 65 year old female presenting with a chief complaint of   Chief Complaint   Patient presents with     Urgent Care     Cough     COUGH, SOB, and now experiencing wheezing and heavy chest feeling. NO hx of asthma and COPD. Haved used inhaler for the last 3 days and it did help but she ran out. NO fever, chills, bodyaches.       She is an established patient of [facility].    URI Adult    Onset of symptoms was 3 week(s) ago.  Course of illness is worsening.    Severity moderate  Current and Associated symptoms: cough - productive, wheezing and shortness of breath  Treatment measures tried include albuterol.  Predisposing factors include seasonal allergies.    Patient tested negative for covid on 11/20    Review of Systems   Constitutional: Negative.    HENT: Negative.    Eyes: Negative.    Respiratory: Positive for cough, shortness of breath and wheezing. Negative for apnea, choking, chest tightness and stridor.    Cardiovascular: Negative.    Gastrointestinal: Negative.    Genitourinary: Negative.    Musculoskeletal: Negative.    Skin: Negative.    Neurological: Negative.    Psychiatric/Behavioral: Negative.        Past Medical History:   Diagnosis Date     Breast cancer metastasized to bone (H)      Lymphedema of left upper extremity      Family History   Problem Relation Age of Onset     Breast Cancer Mother 45     Lung Cancer Father         smoker     Breast Cancer Sister 61     Current Outpatient Medications   Medication Sig Dispense Refill     acetaminophen (TYLENOL) 500 MG tablet Take 1,000 mg by mouth every 6 hours as needed for mild pain       calcium carbonate (OS-TAYLER) 500 MG tablet Take 1 tablet by mouth daily       chlorhexidine (PERIDEX) 0.12 % solution        ibuprofen (ADVIL/MOTRIN) 600 MG tablet Take 600 mg by mouth every 6 hours as needed for moderate pain       letrozole (FEMARA) 2.5 MG tablet Take 1 tablet (2.5 mg) by mouth daily 90 tablet 3     olopatadine  (PATANOL) 0.1 % ophthalmic solution Place 1 drop into both eyes 2 times daily 5 mL 0     omeprazole 20 MG tablet Take 1 tablet (20 mg) by mouth daily 90 tablet 2     order for DME Equipment being ordered: 2 Mastectomy bras and 1 prosthetheses. 2 Piece 0     order for DME L UE class 2 compression sleeve and gauntlet  Night garment  Bandaging supplies 1 each 1     SENNA-docusate sodium (SENNA S) 8.6-50 MG tablet Take 1 tablet by mouth At Bedtime Take while taking oxycodone to prevent constipation. 24 tablet 0     VITAMIN D, CHOLECALCIFEROL, PO Take 1,000 Units by mouth daily       albuterol (PROAIR HFA/PROVENTIL HFA/VENTOLIN HFA) 108 (90 Base) MCG/ACT inhaler Inhale 2 puffs into the lungs every 6 hours as needed for shortness of breath / dyspnea or wheezing (Patient not taking: Reported on 11/27/2020) 1 Inhaler 0     Social History     Tobacco Use     Smoking status: Never Smoker     Smokeless tobacco: Never Used   Substance Use Topics     Alcohol use: No       OBJECTIVE  /74   Pulse 92   Temp 98.9  F (37.2  C) (Tympanic)   Resp 22   SpO2 96%     Physical Exam  Constitutional:       General: She is not in acute distress.     Appearance: Normal appearance. She is normal weight. She is not ill-appearing, toxic-appearing or diaphoretic.   HENT:      Head: Normocephalic and atraumatic.      Right Ear: Tympanic membrane, ear canal and external ear normal. There is no impacted cerumen.      Left Ear: Tympanic membrane, ear canal and external ear normal. There is no impacted cerumen.      Nose: Nose normal. No congestion or rhinorrhea.      Mouth/Throat:      Mouth: Mucous membranes are moist.      Pharynx: Oropharynx is clear. No oropharyngeal exudate or posterior oropharyngeal erythema.   Neck:      Musculoskeletal: Normal range of motion and neck supple. No muscular tenderness.   Cardiovascular:      Rate and Rhythm: Normal rate and regular rhythm.      Pulses: Normal pulses.      Heart sounds: Normal heart  sounds. No murmur. No friction rub. No gallop.    Pulmonary:      Effort: Pulmonary effort is normal. No respiratory distress.      Breath sounds: No stridor. Examination of the right-upper field reveals wheezing. Examination of the left-upper field reveals wheezing. Examination of the right-middle field reveals wheezing. Examination of the left-middle field reveals wheezing. Examination of the right-lower field reveals wheezing. Examination of the left-lower field reveals wheezing. Wheezing present. No rhonchi or rales.   Chest:      Chest wall: No tenderness.   Lymphadenopathy:      Cervical: No cervical adenopathy.   Neurological:      General: No focal deficit present.      Mental Status: She is alert and oriented to person, place, and time. Mental status is at baseline.      Gait: Gait normal.   Psychiatric:         Mood and Affect: Mood normal.         Behavior: Behavior normal.         Thought Content: Thought content normal.         Judgment: Judgment normal.       An X-ray was ordered today and reviewed by me. There does not appear to be any evidence of cardiopulmonary disease.. Awaiting radiology report.     ASSESSMENT/PLAN:    (J40) Bronchitis  (primary encounter diagnosis)  Plan: XR Chest 2 Views, azithromycin (ZITHROMAX) 250         MG tablet, predniSONE (DELTASONE) 20 MG tablet,     Age 12 months or more  Okay to use Zarbee's   Okay to use Rx Children Tylenol if prescribed (Dose based on weight)    Age 2-12:   Okay to use Children Motrin or Tylenol over the counter.    Adults:  Okay to take acetaminophen 500 mg- 2 tabs (Total of 1000 mg) every 8 hrs   Okay to take ibuprofen 200 mg- 3 tabs (Total of 600 mg) every 6 hours        Okay to use Neti pot for sinus lavage up to three times daily for congestion and sinus pressure if present. Daily hot shower can be beneficial for congestion and body aches. Okay to use bedroom vaporizer or humidifier if symptoms are worse at night. Nightly Vicks Vapor rub and  5-10 mg of Melatonin okay to use for sleep.     Over the counter cough medication and decongestants okay if not prescribed by me during this visit. For homeopathic alternatives to cough syrup and decongestant, feel free to try Elderberry extract.    Okay to use salt water gargles, warm tea (or warm water with lemon and honey), and lozenges for any throat discomfort. Chloraseptic spray is also highly encourages for throat pain/irritation.     Patient will need to get plenty of rest and drink at least 1.5-2 liters of fluids daily for adults and 1-1.5 liters for children. If vomiting and not tolerating liquids for more than 24 hrs, please go to your nearest emergency department for IV fluids and further treatment.     Patient is not contagious after 1 week from start of symptoms. If possible, wear mask for first 7 days. Wash hands regularly and vigorously for 30 seconds often.       Patient was advised to return to clinic if symptoms do not improve in the amount of time specified in the AVS or if symptoms worsen. Patient educated on red flag symptoms and asked to go directly to the ED if symptoms present themselves.     Chandler Borrego PA-C on 11/27/2020 at 2:31 PM

## 2020-11-27 NOTE — TELEPHONE ENCOUNTER
My chart message sent to patient    Irais Louise RN   [facility]

## 2020-11-27 NOTE — TELEPHONE ENCOUNTER
Providers,    See patients my chart message. She had covid test on 11/20/20 and it was negative. She should not be coming in if she has ongoing symptoms? Should she do virtual or go to urgent care? Please advise    Thanks  Irais Louise RN   [facility]

## 2020-11-27 NOTE — PATIENT INSTRUCTIONS
Patient Education     ??????? [Bronchitis, Adult: Abx Tx]  ??????? (BRONCHITIS) - ??? ???????????? ??????????? ??????????? ????? (????????). ????? ????????? ??? ??????? ????????. ????????: ?????? ?? ?????? (????????) ? ?????????????? ????????? ??????????? ????. ??? ???????, ??????? ?????? 7-14 ????. ?????? ????? ????? ???????? ???????? ????????? ??????????. ??? ????? ??????? ????????? ????????? ?????????? ????????????.       ???????? ????  1. ??? ?????? ????????? ?????????? ???????? ????? ? ??????? 2-3 ???? . ??? ????????????? ??????? ????? ?? ??????????????? .  2. ?????????? ?? ??????? . ????????? ???? , ? ??????? ????? .  3. ????? ????????? ???????????? (Tylenol) ??? ????????? (Motrin, Advil), ????? ??????? ??? ??? ???? , ???? ?? ???? ????????? ?????? ?????????? . [ ??????????. ??? ??????? ??????????? ??????????? ?????? ??? ????? , ? ????? ??? ???????????? ???? ??????? ??? ????????????? ? ????????? - ???????? ?????? ??????????????????? ? ?????? ????? ??? , ??? ????????? ??? ????????? .]  4. ???????? ???????? ???????? , ??????? ????????? ?? ????? ?????? ???? . ???????????? 6-8 ???????? ???????? ( ???? , ???????????? ???????? , ????? , ??? , ???? ? ? . ? .) ? ???? , ????? ???????? ????????????? . ?????????????? ???????? ???????????? ?????????? ????????? ?? ?????? .  5. ????????? ?????? ????? , ??????????? ??? ??????? ? ?????????? ??????????????? , ???????? Robitussin DM, ? ?????????????? ???????? (Actifed ??? Sudafed) ????? ????????? ?????? ? ???????????? ???? . [ ?????????? . ?? ??????????? ?????????????? ???????? (decongestants) ??? ??????? ???????? ???????? .]  6. ????????? ???? ?????? ????????????, ???? ???? ???????????? ????????? ??? ????? ????????? ????.  ?????????? ?????? ??????????  ? ?????? ????? ??? ? ???????????? ? ??????????? ??????????, ???? ????????? ?? ????????? ????? ??? ???.  [??????????. ????? ? ???????? 65 ??? ? ??????, ? ????? ??? ??????? ??????????? ????? ??? ???????????? ??????????????  ??????????? ?????? (COPD) ????????????? ????????? ?????????????? ?????????? ( PNEUMOCOCCAL VACCINATION) ?????? ???? ??? ? ????????????????? ?????????? (FLU-SHOT)) ???????? ??????. ??????????????????? ? ?????? ?? ???? ????????. ???? ??? ??????? ???????, ??????????? ????????? ???? ??????????????. ? ?????? ??????????? ????? ????????, ??????? ????? ???????? ?? ???????, ?? ?????? ????????? ?? ????.]  ??????????????? ?????????? ?? ??????????? ???????  ??? ????? ?? ????????????? ???? ?????????:  -- ??????????? ???? 100,4 ? F (38,0 ? C) ? ??????? ????? ???? ????  -- ???????????? ??????? , ????????? ??????? ??? ???? ??? ???????  -- ???????????? ?????? ??? ?????????? ?????????? ?????????? ???????  -- ???????? , ?????????? , ???????? ???? , ???? ? ??????? ???? , ???? ? ???? ??? ?????????? ???    [id number] ??. [employer] [address], [place] [address]. ??? ????? ????????. ?????? ?????????? ?? ???????? ??????? ???????????????? ??????????? ??????. ?????? ?????????? ???????? ?????? ?????.

## 2020-11-29 DIAGNOSIS — C79.51 BONE METASTASIS: Primary | ICD-10-CM

## 2020-12-02 NOTE — TELEPHONE ENCOUNTER
Refill denied. Patient just got Rx on 11/17/20, asking for refill too soon    Irais Louise RN   [facility]

## 2020-12-14 ENCOUNTER — ANCILLARY PROCEDURE (OUTPATIENT)
Dept: CT IMAGING | Facility: CLINIC | Age: 65
End: 2020-12-14
Attending: PHYSICIAN ASSISTANT
Payer: COMMERCIAL

## 2020-12-14 VITALS
WEIGHT: 181 LBS | TEMPERATURE: 97.5 F | RESPIRATION RATE: 16 BRPM | BODY MASS INDEX: 32.07 KG/M2 | SYSTOLIC BLOOD PRESSURE: 119 MMHG | DIASTOLIC BLOOD PRESSURE: 77 MMHG | OXYGEN SATURATION: 93 % | HEART RATE: 88 BPM

## 2020-12-14 DIAGNOSIS — C79.51 BONE METASTASIS: ICD-10-CM

## 2020-12-14 DIAGNOSIS — C50.919 METASTATIC BREAST CANCER: ICD-10-CM

## 2020-12-14 LAB
ALBUMIN SERPL-MCNC: 3.1 G/DL (ref 3.4–5)
ALP SERPL-CCNC: 70 U/L (ref 40–150)
ALT SERPL W P-5'-P-CCNC: 66 U/L (ref 0–50)
ANION GAP SERPL CALCULATED.3IONS-SCNC: 5 MMOL/L (ref 3–14)
AST SERPL W P-5'-P-CCNC: 42 U/L (ref 0–45)
BASOPHILS # BLD AUTO: 0 10E9/L (ref 0–0.2)
BASOPHILS NFR BLD AUTO: 0.4 %
BILIRUB SERPL-MCNC: 0.4 MG/DL (ref 0.2–1.3)
BUN SERPL-MCNC: 14 MG/DL (ref 7–30)
CALCIUM SERPL-MCNC: 8.4 MG/DL (ref 8.5–10.1)
CANCER AG27-29 SERPL-ACNC: 10 U/ML (ref 0–39)
CEA SERPL-MCNC: <0.5 UG/L (ref 0–2.5)
CHLORIDE SERPL-SCNC: 108 MMOL/L (ref 94–109)
CO2 SERPL-SCNC: 30 MMOL/L (ref 20–32)
CREAT SERPL-MCNC: 0.78 MG/DL (ref 0.52–1.04)
DIFFERENTIAL METHOD BLD: ABNORMAL
EOSINOPHIL # BLD AUTO: 0.5 10E9/L (ref 0–0.7)
EOSINOPHIL NFR BLD AUTO: 6.3 %
ERYTHROCYTE [DISTWIDTH] IN BLOOD BY AUTOMATED COUNT: 13.4 % (ref 10–15)
GFR SERPL CREATININE-BSD FRML MDRD: 80 ML/MIN/{1.73_M2}
GLUCOSE SERPL-MCNC: 94 MG/DL (ref 70–99)
HCT VFR BLD AUTO: 37.4 % (ref 35–47)
HGB BLD-MCNC: 11.6 G/DL (ref 11.7–15.7)
IMM GRANULOCYTES # BLD: 0 10E9/L (ref 0–0.4)
IMM GRANULOCYTES NFR BLD: 0.1 %
LYMPHOCYTES # BLD AUTO: 2.4 10E9/L (ref 0.8–5.3)
LYMPHOCYTES NFR BLD AUTO: 32 %
MCH RBC QN AUTO: 29.1 PG (ref 26.5–33)
MCHC RBC AUTO-ENTMCNC: 31 G/DL (ref 31.5–36.5)
MCV RBC AUTO: 94 FL (ref 78–100)
MONOCYTES # BLD AUTO: 0.5 10E9/L (ref 0–1.3)
MONOCYTES NFR BLD AUTO: 6.8 %
NEUTROPHILS # BLD AUTO: 4.1 10E9/L (ref 1.6–8.3)
NEUTROPHILS NFR BLD AUTO: 54.4 %
NRBC # BLD AUTO: 0 10*3/UL
NRBC BLD AUTO-RTO: 0 /100
PLATELET # BLD AUTO: 223 10E9/L (ref 150–450)
POTASSIUM SERPL-SCNC: 3.9 MMOL/L (ref 3.4–5.3)
PROT SERPL-MCNC: 7.2 G/DL (ref 6.8–8.8)
RBC # BLD AUTO: 3.98 10E12/L (ref 3.8–5.2)
SODIUM SERPL-SCNC: 144 MMOL/L (ref 133–144)
WBC # BLD AUTO: 7.5 10E9/L (ref 4–11)

## 2020-12-14 PROCEDURE — 85025 COMPLETE CBC W/AUTO DIFF WBC: CPT | Performed by: INTERNAL MEDICINE

## 2020-12-14 PROCEDURE — 250N000011 HC RX IP 250 OP 636: Performed by: INTERNAL MEDICINE

## 2020-12-14 PROCEDURE — 86300 IMMUNOASSAY TUMOR CA 15-3: CPT | Performed by: INTERNAL MEDICINE

## 2020-12-14 PROCEDURE — 71260 CT THORAX DX C+: CPT | Mod: GC | Performed by: RADIOLOGY

## 2020-12-14 PROCEDURE — 36591 DRAW BLOOD OFF VENOUS DEVICE: CPT

## 2020-12-14 PROCEDURE — 80053 COMPREHEN METABOLIC PANEL: CPT | Performed by: INTERNAL MEDICINE

## 2020-12-14 PROCEDURE — 82378 CARCINOEMBRYONIC ANTIGEN: CPT | Performed by: INTERNAL MEDICINE

## 2020-12-14 PROCEDURE — 74177 CT ABD & PELVIS W/CONTRAST: CPT | Mod: GC | Performed by: RADIOLOGY

## 2020-12-14 RX ORDER — HEPARIN SODIUM (PORCINE) LOCK FLUSH IV SOLN 100 UNIT/ML 100 UNIT/ML
5 SOLUTION INTRAVENOUS ONCE
Status: COMPLETED | OUTPATIENT
Start: 2020-12-14 | End: 2020-12-14

## 2020-12-14 RX ORDER — IOPAMIDOL 755 MG/ML
111 INJECTION, SOLUTION INTRAVASCULAR ONCE
Status: COMPLETED | OUTPATIENT
Start: 2020-12-14 | End: 2020-12-14

## 2020-12-14 RX ORDER — HEPARIN SODIUM (PORCINE) LOCK FLUSH IV SOLN 100 UNIT/ML 100 UNIT/ML
5 SOLUTION INTRAVENOUS EVERY 8 HOURS PRN
Status: DISCONTINUED | OUTPATIENT
Start: 2020-12-14 | End: 2021-03-11 | Stop reason: HOSPADM

## 2020-12-14 RX ADMIN — HEPARIN SODIUM (PORCINE) LOCK FLUSH IV SOLN 100 UNIT/ML 5 ML: 100 SOLUTION at 08:23

## 2020-12-14 RX ADMIN — Medication 5 ML: at 07:18

## 2020-12-14 RX ADMIN — IOPAMIDOL 111 ML: 755 INJECTION, SOLUTION INTRAVASCULAR at 07:52

## 2020-12-14 ASSESSMENT — PAIN SCALES - GENERAL: PAINLEVEL: NO PAIN (0)

## 2020-12-15 ENCOUNTER — INFUSION THERAPY VISIT (OUTPATIENT)
Dept: ONCOLOGY | Facility: CLINIC | Age: 65
End: 2020-12-15
Attending: INTERNAL MEDICINE
Payer: COMMERCIAL

## 2020-12-15 DIAGNOSIS — Z91.89 AT RISK FOR CARDIOMYOPATHY: Primary | ICD-10-CM

## 2020-12-15 DIAGNOSIS — C50.919 METASTATIC BREAST CANCER: ICD-10-CM

## 2020-12-15 DIAGNOSIS — C50.912 MALIGNANT NEOPLASM OF LEFT FEMALE BREAST, UNSPECIFIED ESTROGEN RECEPTOR STATUS, UNSPECIFIED SITE OF BREAST (H): Primary | ICD-10-CM

## 2020-12-15 PROCEDURE — 99442 PR PHYSICIAN TELEPHONE EVALUATION 11-20 MIN: CPT | Mod: 95 | Performed by: PHYSICIAN ASSISTANT

## 2020-12-15 PROCEDURE — 258N000003 HC RX IP 258 OP 636: Performed by: PHYSICIAN ASSISTANT

## 2020-12-15 PROCEDURE — 250N000011 HC RX IP 250 OP 636: Performed by: PHYSICIAN ASSISTANT

## 2020-12-15 PROCEDURE — 96413 CHEMO IV INFUSION 1 HR: CPT

## 2020-12-15 RX ORDER — HEPARIN SODIUM (PORCINE) LOCK FLUSH IV SOLN 100 UNIT/ML 100 UNIT/ML
5 SOLUTION INTRAVENOUS EVERY 8 HOURS
Status: CANCELLED | OUTPATIENT
Start: 2020-12-17

## 2020-12-15 RX ORDER — ALBUTEROL SULFATE 90 UG/1
1-2 AEROSOL, METERED RESPIRATORY (INHALATION)
Status: CANCELLED
Start: 2021-01-05

## 2020-12-15 RX ORDER — HEPARIN SODIUM (PORCINE) LOCK FLUSH IV SOLN 100 UNIT/ML 100 UNIT/ML
5 SOLUTION INTRAVENOUS EVERY 8 HOURS
Status: DISCONTINUED | OUTPATIENT
Start: 2020-12-15 | End: 2020-12-15 | Stop reason: HOSPADM

## 2020-12-15 RX ORDER — MEPERIDINE HYDROCHLORIDE 25 MG/ML
25 INJECTION INTRAMUSCULAR; INTRAVENOUS; SUBCUTANEOUS EVERY 30 MIN PRN
Status: CANCELLED | OUTPATIENT
Start: 2020-12-17

## 2020-12-15 RX ORDER — EPINEPHRINE 0.3 MG/.3ML
0.3 INJECTION SUBCUTANEOUS EVERY 5 MIN PRN
Status: CANCELLED | OUTPATIENT
Start: 2021-01-05

## 2020-12-15 RX ORDER — DIPHENHYDRAMINE HCL 25 MG
50 CAPSULE ORAL ONCE
Status: CANCELLED
Start: 2020-12-17

## 2020-12-15 RX ORDER — EPINEPHRINE 1 MG/ML
0.3 INJECTION, SOLUTION INTRAMUSCULAR; SUBCUTANEOUS EVERY 5 MIN PRN
Status: CANCELLED | OUTPATIENT
Start: 2020-12-17

## 2020-12-15 RX ORDER — DIPHENHYDRAMINE HYDROCHLORIDE 50 MG/ML
50 INJECTION INTRAMUSCULAR; INTRAVENOUS
Status: CANCELLED
Start: 2021-01-05

## 2020-12-15 RX ORDER — ALBUTEROL SULFATE 0.83 MG/ML
2.5 SOLUTION RESPIRATORY (INHALATION)
Status: CANCELLED | OUTPATIENT
Start: 2020-12-17

## 2020-12-15 RX ORDER — ALBUTEROL SULFATE 0.83 MG/ML
2.5 SOLUTION RESPIRATORY (INHALATION)
Status: CANCELLED | OUTPATIENT
Start: 2021-01-05

## 2020-12-15 RX ORDER — LORAZEPAM 2 MG/ML
0.5 INJECTION INTRAMUSCULAR EVERY 4 HOURS PRN
Status: CANCELLED
Start: 2020-12-17

## 2020-12-15 RX ORDER — DIPHENHYDRAMINE HYDROCHLORIDE 50 MG/ML
50 INJECTION INTRAMUSCULAR; INTRAVENOUS
Status: CANCELLED
Start: 2020-12-17

## 2020-12-15 RX ORDER — MEPERIDINE HYDROCHLORIDE 25 MG/ML
25 INJECTION INTRAMUSCULAR; INTRAVENOUS; SUBCUTANEOUS EVERY 30 MIN PRN
Status: CANCELLED | OUTPATIENT
Start: 2021-01-05

## 2020-12-15 RX ORDER — SODIUM CHLORIDE 9 MG/ML
1000 INJECTION, SOLUTION INTRAVENOUS CONTINUOUS PRN
Status: CANCELLED
Start: 2021-01-05

## 2020-12-15 RX ORDER — METHYLPREDNISOLONE SODIUM SUCCINATE 125 MG/2ML
125 INJECTION, POWDER, LYOPHILIZED, FOR SOLUTION INTRAMUSCULAR; INTRAVENOUS
Status: CANCELLED
Start: 2021-01-05

## 2020-12-15 RX ORDER — ACETAMINOPHEN 325 MG/1
650 TABLET ORAL
Status: CANCELLED | OUTPATIENT
Start: 2021-01-05

## 2020-12-15 RX ORDER — ALBUTEROL SULFATE 90 UG/1
1-2 AEROSOL, METERED RESPIRATORY (INHALATION)
Status: CANCELLED
Start: 2020-12-17

## 2020-12-15 RX ORDER — ACETAMINOPHEN 325 MG/1
650 TABLET ORAL
Status: CANCELLED | OUTPATIENT
Start: 2020-12-17

## 2020-12-15 RX ORDER — METHYLPREDNISOLONE SODIUM SUCCINATE 125 MG/2ML
125 INJECTION, POWDER, LYOPHILIZED, FOR SOLUTION INTRAMUSCULAR; INTRAVENOUS
Status: CANCELLED
Start: 2020-12-17

## 2020-12-15 RX ORDER — SODIUM CHLORIDE 9 MG/ML
1000 INJECTION, SOLUTION INTRAVENOUS CONTINUOUS PRN
Status: CANCELLED
Start: 2020-12-17

## 2020-12-15 RX ORDER — DIPHENHYDRAMINE HCL 25 MG
50 CAPSULE ORAL ONCE
Status: CANCELLED
Start: 2021-01-05

## 2020-12-15 RX ORDER — EPINEPHRINE 1 MG/ML
0.3 INJECTION, SOLUTION INTRAMUSCULAR; SUBCUTANEOUS EVERY 5 MIN PRN
Status: CANCELLED | OUTPATIENT
Start: 2021-01-05

## 2020-12-15 RX ORDER — HEPARIN SODIUM (PORCINE) LOCK FLUSH IV SOLN 100 UNIT/ML 100 UNIT/ML
5 SOLUTION INTRAVENOUS EVERY 8 HOURS
Status: CANCELLED | OUTPATIENT
Start: 2021-01-05

## 2020-12-15 RX ORDER — LORAZEPAM 2 MG/ML
0.5 INJECTION INTRAMUSCULAR EVERY 4 HOURS PRN
Status: CANCELLED
Start: 2021-01-05

## 2020-12-15 RX ORDER — EPINEPHRINE 0.3 MG/.3ML
0.3 INJECTION SUBCUTANEOUS EVERY 5 MIN PRN
Status: CANCELLED | OUTPATIENT
Start: 2020-12-17

## 2020-12-15 RX ADMIN — Medication 5 ML: at 15:43

## 2020-12-15 RX ADMIN — SODIUM CHLORIDE 250 ML: 9 INJECTION, SOLUTION INTRAVENOUS at 15:11

## 2020-12-15 RX ADMIN — TRASTUZUMAB 450 MG: 150 INJECTION, POWDER, LYOPHILIZED, FOR SOLUTION INTRAVENOUS at 15:11

## 2020-12-15 NOTE — PROGRESS NOTES
Infusion Nursing Note:  Hoda Brush presents today for Cycle 56, day 1 Herceptin.    Patient seen by provider today: Yes: [place]    Note: Patient presents to clinic today feeling well with no questions.  Pt did not request or require any intervention for pain today.    Intravenous Access:  Implanted Port.    Treatment Conditions:  ECHO/MUGA completed 11/3/2020 EF 55-60%    Post Infusion Assessment:  Patient tolerated infusion without incident.  Blood return noted pre and post infusion.  Site patent and intact, free from redness, edema or discomfort.  No evidence of extravasations.  Access discontinued per protocol.    Discharge Plan:   Patient declined prescription refills.  Discharge instructions reviewed with: Patient.  Patient and/or family verbalized understanding of discharge instructions and all questions answered.  AVS to patient via [employer].  Patient will return 1/5/2020 for next appointment.   Patient discharged in stable condition accompanied by: self.  Departure Mode: Ambulatory.    Steffi Oh RN

## 2020-12-15 NOTE — PATIENT INSTRUCTIONS
Contact Numbers    [facility] Main Line/[facility]: [phone]    Call with chills and/or temperature greater than or equal to 100.5, uncontrolled nausea/vomiting, diarrhea, constipation, dizziness, shortness of breath, chest pain, bleeding, unexplained bruising, or any new/concerning symptoms, questions/concerns.     If you are having any concerning symptoms or wish to speak to a provider before your next infusion visit, please call your care coordinator or triage to notify them so we can adequately serve you.       After Hours: [phone]    If after hours, weekends, or holidays, call main hospital  and ask for Oncology doctor on call.       December 2020 Sunday Monday Tuesday Wednesday Thursday Friday Saturday             1     2     3     4     5       6     7     8     9     10     11     12       13     14    LAB CENTRAL   7:30 AM   (15 min.)    [facility] DRAW   [facility]    CT CHEST/ABDOMEN/PELVIS W   8:10 AM   (60 min.)   [facility]   [facility] [place] 15    TELEPHONE VISIT RETURN   7:50 AM   (90 min.)   Christine Beasley PA-C   [facility] ONC INFUSION 60   3:00 PM   (60 min.)    ONCOLOGY INFUSION   [facility] 16     17     18     19       20     21     22     23     24     25     26       27     28     29 30 31 January 2021 Sunday Monday Tuesday Wednesday Thursday Friday Saturday                            1     2       3     4     5    LAB CENTRAL   9:30 AM   (15 min.)    [facility] DRAW   [facility] ONC INFUSION 60  10:00 AM   (60 min.)    ONCOLOGY INFUSION   [facility] 6     7     8     9       10     11     12     13     14     15     16       17     18     19     20     21     22     23       24     25     26    LAB CENTRAL   9:30 AM   (15 min.)     [facility] LAB DRAW   [facility] ONC INFUSION 60  10:00 AM   (60 min.)    ONCOLOGY INFUSION   [facility] 27     28     29     30       31                                                   Lab Results:  No results found for this or any previous visit (from the past 12 hour(s)).

## 2020-12-15 NOTE — PROGRESS NOTES
"oHda Brush is a 65 year old female who is being evaluated via a billable telephone visit.      The patient has been notified of following:     \"This telephone visit will be conducted via a call between you and your physician/provider. We have found that certain health care needs can be provided without the need for a physical exam.  This service lets us provide the care you need with a short phone conversation.  If a prescription is necessary we can send it directly to your pharmacy.  If lab work is needed we can place an order for that and you can then stop by our lab to have the test done at a later time.    Telephone visits are billed at different rates depending on your insurance coverage. During this emergency period, for some insurers they may be billed the same as an in-person visit.  Please reach out to your insurance provider with any questions.    If during the course of the call the physician/provider feels a telephone visit is not appropriate, you will not be charged for this service.\"    Patient has given verbal consent for Telephone visit?  Yes    What phone number would you like to be contacted at? [phone]    How would you like to obtain your AVS? Lane Rios, A    Dec 15, 2020    PHONE     History of Present Illness: Hoda Brush is a 65 year old female with ER positive, HER2 positive metastatic left breast cancer (bone).     TREATMENT HISTORY:  A. Initial diagnosis with metastatic breast cancer in [facility].  Neoadjuvant CAF x 6.   B. Left mastectomy. Left axillary node dissection.  C.  Radiation in 1 dose to R iliac region. g Gy.  C. Herceptin for 2 years taxane for a prescribed course then stopped, monthly zoledronic acid.  She had 2 years of Herceptin with tamoxifen added after chemotherapy.  D.  Tamoxifen alone and zoledronic acid every 3 months.  E.  [place]  We restarted Herceptin every 3 weeks and continued tamoxifen. Bone targeted agent " changed to denosumab every 6 weeks.   F. Tamoxifen changed to letrozole 10/8/19.      She is from [facility], formerly from [facility], and came [place] the [place]  She lives with her daughter and is here for continuation of every 3 week Herceptin, which she receives along with tamoxifen.  Her tumor is ER positive, IA positive, HER-2 positive.  She had metastatic disease at the time of presentation with bone-only metastases by report.  She presented with metastatic disease in 02/2014.  Staging was initially bone-only metastases by report.  She did her staging in 02/2014 that showed that she had stage IV disease, T4N2M1 invasive ductal carcinoma of the left breast.  She had a right hip metastasis.  She underwent neoadjuvant CAF, left mastectomy, left axillary lymph node dissection and radiation.  She had radiation of the right iliac region where she had metastatic disease.  Pathology report from [facility] shows the tumor to be positive for ER in 75% of the cells, positive for IA in 40% of the cells, and the HER-2 was 3+ positive by immunohistochemistry.  The Ki-67 was 20%.  She had no evidence of nonbone metastatic disease on her PET/CT scan from 08/2017.     Had acute abdominal pain and N/V and found to have cholecystitis in the ER. Had cholecystectomy on 8/31. Herceptin resumed on 9/13/19. Tamoxifen changed to letrozole on 10/8/19. Last restaging on 5/18/20 was stable.      Interval History:   On the phone with [place] .      Hoda is doing well today, no new issues. Eating and drinking well- no new oral cavity pain, sores or open areas, per her.  She is eating/drinking well, energy levels are good.  No new areas of pain.  Has ongoing low back pain at times, sternum has been less bothersome this last month.  Taking PPI daily- no GERD, N/V.  Had a URI recently, covid negative.  Resolved with steroids, given for bronchitis. No Covid in her family.   at home, healthy.  Good energy levels, walks twice a day,  likes to read and embroidery      ROS: 10 point ROS neg other than the symptoms noted above in the HPI.        PHYSICAL EXAM:     Objective:  General: patient sounds in no audible acute distress, alert and oriented, speech clear and fluid  Resp: Speaking in full sentences, no audible respiratory distress, no cough, no audible wheeze  Psych: able to articulate logical thoughts, able to abstract reason, no tangential thoughts, no hallucinations or delusions  Her affect is normal     Labs:      11/24/2020 14:55 12/14/2020 07:22   Sodium 141 144   Potassium 3.6 3.9   Chloride 108 108   Carbon Dioxide 29 30   Urea Nitrogen 16 14   Creatinine 0.77 0.78   GFR Estimate 81 80   GFR Estimate If Black >90 >90   Calcium 9.0 8.4 (L)   Anion Gap 5 5   Albumin 3.2 (L) 3.1 (L)   Protein Total 7.8 7.2   Bilirubin Total 0.2 0.4   Alkaline Phosphatase 73 70   ALT 84 (H) 66 (H)   AST 53 (H) 42   CA 27-29 10 10   Glucose 84 94   WBC 8.1 7.5   Hemoglobin 12.0 11.6 (L)   Hematocrit 37.8 37.4   Platelet Count 236 223   RBC Count 4.07 3.98   MCV 93 94     CT CAP  IMPRESSION: In this 65-year-old female with a history of breast  cancer, status post left mastectomy:  1. Diffuse sclerotic osseous metastatic lesions appear similar to  prior.  2. A few solid pulmonary nodules are stable.   2a. Right middle lobe 5 mm nodule, unchanged from 2/23/2020, however  head increase in prior exams.  2b.  Other lung nodule stable from 8/21/2017.    ECHO Interpretation Summary  Global and regional left ventricular function is normal with an EF of 55-60%.  Global right ventricular function is normal. The right ventricle is normal  size.  No significant valvular abnormalities.  This study was compared with the study from 08/10/2020. No changes in the  biventricular function were noted.      Assessment and Plan:      1. Metastatic breast cancer, ER, MI, HER2+ positive:   Was last treated in [facility] with Herceptin. We have continued Herceptin every 3 weeks as  well as daily letrozole.  Continues to tolerate treatment, no new concerns.  Hoda and I reviewed her CT CAP today showing stable disease.  Her ECHO is WNL and without change.  Tumor markers stable.   - will continue Herceptin today.   - continue letrozole  - scans and ECHOs every 3 months     2. Bone metastasis: Has previously been on xgeva every 6 weeks. On calcium + vitamin D.  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 This was biopsy proven. Note from oral surgery was scanned in chart. Recent dentistry visits from 2020 describe ongoing bone exposure, thus will discontinue Xgeva permanently for now.  Reviewed with patient that not safe in the setting on ongoing ONJ.  Given her disease is stable as well, no need to stay on, I deleted the order.       3. Transaminitis: Mild. US showed fatty liver, she is walking twice a day for exercise.     4. GERD: Continue omeprazole daily. Symptoms much improved         Phone call duration: 15 minutes    Erica Beasley PA-C

## 2021-01-05 ENCOUNTER — INFUSION THERAPY VISIT (OUTPATIENT)
Dept: ONCOLOGY | Facility: CLINIC | Age: 66
End: 2021-01-05
Attending: INTERNAL MEDICINE
Payer: COMMERCIAL

## 2021-01-05 ENCOUNTER — APPOINTMENT (OUTPATIENT)
Dept: LAB | Facility: CLINIC | Age: 66
End: 2021-01-05
Attending: INTERNAL MEDICINE
Payer: COMMERCIAL

## 2021-01-05 VITALS
HEART RATE: 87 BPM | RESPIRATION RATE: 18 BRPM | BODY MASS INDEX: 32.37 KG/M2 | DIASTOLIC BLOOD PRESSURE: 84 MMHG | SYSTOLIC BLOOD PRESSURE: 129 MMHG | OXYGEN SATURATION: 97 % | WEIGHT: 182.7 LBS | TEMPERATURE: 98.2 F

## 2021-01-05 DIAGNOSIS — C50.912 MALIGNANT NEOPLASM OF LEFT FEMALE BREAST, UNSPECIFIED ESTROGEN RECEPTOR STATUS, UNSPECIFIED SITE OF BREAST (H): Primary | ICD-10-CM

## 2021-01-05 LAB
ALBUMIN SERPL-MCNC: 3.3 G/DL (ref 3.4–5)
ALP SERPL-CCNC: 65 U/L (ref 40–150)
ALT SERPL W P-5'-P-CCNC: 78 U/L (ref 0–50)
ANION GAP SERPL CALCULATED.3IONS-SCNC: 3 MMOL/L (ref 3–14)
AST SERPL W P-5'-P-CCNC: 63 U/L (ref 0–45)
BASOPHILS # BLD AUTO: 0 10E9/L (ref 0–0.2)
BASOPHILS NFR BLD AUTO: 0.3 %
BILIRUB SERPL-MCNC: 0.3 MG/DL (ref 0.2–1.3)
BUN SERPL-MCNC: 17 MG/DL (ref 7–30)
CALCIUM SERPL-MCNC: 9.1 MG/DL (ref 8.5–10.1)
CANCER AG27-29 SERPL-ACNC: 8 U/ML (ref 0–39)
CEA SERPL-MCNC: <0.5 UG/L (ref 0–2.5)
CHLORIDE SERPL-SCNC: 106 MMOL/L (ref 94–109)
CO2 SERPL-SCNC: 29 MMOL/L (ref 20–32)
CREAT SERPL-MCNC: 0.77 MG/DL (ref 0.52–1.04)
DIFFERENTIAL METHOD BLD: ABNORMAL
EOSINOPHIL # BLD AUTO: 0.3 10E9/L (ref 0–0.7)
EOSINOPHIL NFR BLD AUTO: 5 %
ERYTHROCYTE [DISTWIDTH] IN BLOOD BY AUTOMATED COUNT: 13.6 % (ref 10–15)
GFR SERPL CREATININE-BSD FRML MDRD: 81 ML/MIN/{1.73_M2}
GLUCOSE SERPL-MCNC: 92 MG/DL (ref 70–99)
HCT VFR BLD AUTO: 39.9 % (ref 35–47)
HGB BLD-MCNC: 12.4 G/DL (ref 11.7–15.7)
IMM GRANULOCYTES # BLD: 0 10E9/L (ref 0–0.4)
IMM GRANULOCYTES NFR BLD: 0.2 %
LYMPHOCYTES # BLD AUTO: 2.4 10E9/L (ref 0.8–5.3)
LYMPHOCYTES NFR BLD AUTO: 36.3 %
MCH RBC QN AUTO: 29 PG (ref 26.5–33)
MCHC RBC AUTO-ENTMCNC: 31.1 G/DL (ref 31.5–36.5)
MCV RBC AUTO: 93 FL (ref 78–100)
MONOCYTES # BLD AUTO: 0.5 10E9/L (ref 0–1.3)
MONOCYTES NFR BLD AUTO: 7.4 %
NEUTROPHILS # BLD AUTO: 3.4 10E9/L (ref 1.6–8.3)
NEUTROPHILS NFR BLD AUTO: 50.8 %
NRBC # BLD AUTO: 0 10*3/UL
NRBC BLD AUTO-RTO: 0 /100
PLATELET # BLD AUTO: 229 10E9/L (ref 150–450)
POTASSIUM SERPL-SCNC: 4 MMOL/L (ref 3.4–5.3)
PROT SERPL-MCNC: 7.7 G/DL (ref 6.8–8.8)
RBC # BLD AUTO: 4.28 10E12/L (ref 3.8–5.2)
SODIUM SERPL-SCNC: 139 MMOL/L (ref 133–144)
WBC # BLD AUTO: 6.6 10E9/L (ref 4–11)

## 2021-01-05 PROCEDURE — 86300 IMMUNOASSAY TUMOR CA 15-3: CPT | Performed by: PHYSICIAN ASSISTANT

## 2021-01-05 PROCEDURE — 250N000011 HC RX IP 250 OP 636: Performed by: PHYSICIAN ASSISTANT

## 2021-01-05 PROCEDURE — 96413 CHEMO IV INFUSION 1 HR: CPT

## 2021-01-05 PROCEDURE — 258N000003 HC RX IP 258 OP 636: Performed by: PHYSICIAN ASSISTANT

## 2021-01-05 PROCEDURE — 250N000011 HC RX IP 250 OP 636: Performed by: INTERNAL MEDICINE

## 2021-01-05 PROCEDURE — 85025 COMPLETE CBC W/AUTO DIFF WBC: CPT | Performed by: PHYSICIAN ASSISTANT

## 2021-01-05 PROCEDURE — 82378 CARCINOEMBRYONIC ANTIGEN: CPT | Performed by: PHYSICIAN ASSISTANT

## 2021-01-05 PROCEDURE — 80053 COMPREHEN METABOLIC PANEL: CPT | Performed by: PHYSICIAN ASSISTANT

## 2021-01-05 RX ORDER — HEPARIN SODIUM (PORCINE) LOCK FLUSH IV SOLN 100 UNIT/ML 100 UNIT/ML
500 SOLUTION INTRAVENOUS ONCE
Status: COMPLETED | OUTPATIENT
Start: 2021-01-05 | End: 2021-01-05

## 2021-01-05 RX ORDER — HEPARIN SODIUM (PORCINE) LOCK FLUSH IV SOLN 100 UNIT/ML 100 UNIT/ML
5 SOLUTION INTRAVENOUS EVERY 8 HOURS
Status: DISCONTINUED | OUTPATIENT
Start: 2021-01-05 | End: 2021-01-05 | Stop reason: HOSPADM

## 2021-01-05 RX ADMIN — SODIUM CHLORIDE, PRESERVATIVE FREE 500 UNITS: 5 INJECTION INTRAVENOUS at 09:55

## 2021-01-05 RX ADMIN — Medication 5 ML: at 11:15

## 2021-01-05 RX ADMIN — TRASTUZUMAB 450 MG: 150 INJECTION, POWDER, LYOPHILIZED, FOR SOLUTION INTRAVENOUS at 10:40

## 2021-01-05 ASSESSMENT — PAIN SCALES - GENERAL: PAINLEVEL: NO PAIN (0)

## 2021-01-05 NOTE — PROGRESS NOTES
Infusion Nursing Note:  Hoda Brush presents today for Day 1 Cycle 57 Herceptin.    Patient seen by provider today: No    Note: Patient feels well today.  Denies fevers, cough, or shortness of breath.    Intravenous Access:  Implanted Port.    Treatment Conditions:  Lab Results   Component Value Date    HGB 12.4 01/05/2021     Lab Results   Component Value Date    WBC 6.6 01/05/2021      Lab Results   Component Value Date    ANEU 3.4 01/05/2021     Lab Results   Component Value Date     01/05/2021      Lab Results   Component Value Date     01/05/2021                   Lab Results   Component Value Date    POTASSIUM 4.0 01/05/2021             Lab Results   Component Value Date    CR 0.77 01/05/2021                   Lab Results   Component Value Date    TAYLER 9.1 01/05/2021                Lab Results   Component Value Date    BILITOTAL 0.3 01/05/2021           Lab Results   Component Value Date    ALBUMIN 3.3 01/05/2021                    Lab Results   Component Value Date    ALT 78 01/05/2021           Lab Results   Component Value Date    AST 63 01/05/2021       Results reviewed, labs MET treatment parameters, ok to proceed with treatment.  ECHO/MUGA completed 11/3/2020  EF 55-60%.      Post Infusion Assessment:  Patient tolerated infusion without incident.  Blood return noted pre and post infusion.  Site patent and intact, free from redness, edema or discomfort.  No evidence of extravasations.  Access discontinued per protocol.       Discharge Plan:   Discharge instructions reviewed with: Patient.  Patient and/or family verbalized understanding of discharge instructions and all questions answered.  AVS to patient via [employer].  Patient will return 1/26/2020 for next appointment.   Patient discharged in stable condition accompanied by: self.  Departure Mode: Ambulatory.    Shannan Stewart RN

## 2021-01-05 NOTE — NURSING NOTE
Chief Complaint   Patient presents with     Port Draw     labs drawn via port by RN in lab      /84   Pulse 87   Temp 98.2  F (36.8  C) (Tympanic)   Resp 18   Wt 82.9 kg (182 lb 11.2 oz)   SpO2 97%   BMI 32.37 kg/m      Port accessed by RN. Labs drawn and sent. Line flushed with NS and Heparin. Pt tolerated well. Checked in to next appointment.    Hyun Davies RN on 1/5/2021 at 9:55 AM

## 2021-01-25 NOTE — PROGRESS NOTES
Hoda is a 65 year old who is being evaluated via a billable video visit.      How would you like to obtain your AVS? [email or web address]  If the video visit is dropped, the invitation should be resent by: Text to cell phone: [phone]  Will anyone else be joining your video visit? No      Video Start Time: 7:08 am  Video-Visit Details    Type of service:  Video Visit    Video End Time:7:14 AM    Originating Location (pt. Location): Home    Distant Location (provider location):  [facility]     Platform used for Video Visit: Nidia      Jan 26, 2021     History of Present Illness: Hoda Brush is a 65 year old female with ER positive, HER2 positive metastatic left breast cancer (bone).     TREATMENT HISTORY:  A. Initial diagnosis with metastatic breast cancer in [facility].  Neoadjuvant CAF x 6.   B. Left mastectomy. Left axillary node dissection.  C.  Radiation in 1 dose to R iliac region. g Gy.  C. Herceptin for 2 years taxane for a prescribed course then stopped, monthly zoledronic acid.  She had 2 years of Herceptin with tamoxifen added after chemotherapy.  D.  Tamoxifen alone and zoledronic acid every 3 months.  E.  [place]  We restarted Herceptin every 3 weeks and continued tamoxifen. Bone targeted agent changed to denosumab every 6 weeks.   F. Tamoxifen changed to letrozole 10/8/19.      She is from [facility], formerly from [facility], and came [place] the [place]  She lives with her daughter and is here for continuation of every 3 week Herceptin, which she receives along with tamoxifen.  Her tumor is ER positive, RI positive, HER-2 positive.  She had metastatic disease at the time of presentation with bone-only metastases by report.  She presented with metastatic disease in 02/2014.  Staging was initially bone-only metastases by report.  She did her staging in 02/2014 that showed that she had stage IV disease, T4N2M1 invasive ductal carcinoma of the left breast.  She had a right  hip metastasis.  She underwent neoadjuvant CAF, left mastectomy, left axillary lymph node dissection and radiation.  She had radiation of the right iliac region where she had metastatic disease.  Pathology report from [facility] shows the tumor to be positive for ER in 75% of the cells, positive for VA in 40% of the cells, and the HER-2 was 3+ positive by immunohistochemistry.  The Ki-67 was 20%.  She had no evidence of nonbone metastatic disease on her PET/CT scan from 08/2017.     Had acute abdominal pain and N/V and found to have cholecystitis in the ER. Had cholecystectomy on 8/31. Herceptin resumed on 9/13/19. Tamoxifen changed to letrozole on 10/8/19. Last restaging on 12/14/20 was stable.      Interval History:   Hoda is feeling well today. She has no questions or concerns besides wondering about the vaccine. She continues to tolerate treatment remarkably well without any side effects. No hot flashes, headaches, joint or body pain, SOB, fevers/chills, cough, CP, or swelling. She has been eating and drinking well.      Current Outpatient Medications   Medication     calcium carbonate (OS-TAYLER) 500 MG tablet     ibuprofen (ADVIL/MOTRIN) 600 MG tablet     letrozole (FEMARA) 2.5 MG tablet     omeprazole 20 MG tablet     order for DME     SENNA-docusate sodium (SENNA S) 8.6-50 MG tablet     VITAMIN D, CHOLECALCIFEROL, PO     acetaminophen (TYLENOL) 500 MG tablet     albuterol (PROAIR HFA/PROVENTIL HFA/VENTOLIN HFA) 108 (90 Base) MCG/ACT inhaler     chlorhexidine (PERIDEX) 0.12 % solution     olopatadine (PATANOL) 0.1 % ophthalmic solution     order for DME     No current facility-administered medications for this visit.      Facility-Administered Medications Ordered in Other Visits   Medication     heparin 100 UNIT/ML injection 5 mL     sodium chloride (PF) 0.9% PF flush 20 mL      No Known Allergies          PHYSICAL EXAM:     Objective:  General: patient sounds in no audible acute distress, alert and oriented,  speech clear and fluid  Resp: Speaking in full sentences, no audible respiratory distress, no cough, no audible wheeze  Psych: able to articulate logical thoughts, able to abstract reason, no tangential thoughts, no hallucinations or delusions  Her affect is normal     Labs: To be drawn later today           Assessment and Plan:      1. Metastatic breast cancer, ER, NM, HER2+ positive:   Was last treated in [facility] with Herceptin. We have continued Herceptin every 3 weeks as well as daily letrozole. CT CAP and echo last month were stable. Tumor markers have been stable.   - will continue Herceptin today.   - continue letrozole  - scans and ECHOs every 3 months     2. Bone metastasis: Has previously been on xgeva every 6 weeks. On calcium + vitamin D.  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 This was biopsy proven. Note from oral surgery was scanned in chart. Recent dentistry visits from 2020 describe ongoing bone exposure, thus will discontinue Xgeva permanently for now.       3. Transaminitis: Mild. US showed fatty liver, she is walking twice a day for exercise.     4. GERD: Continue omeprazole daily. No current symptoms.    Anne Lea PA-C

## 2021-01-26 ENCOUNTER — VIRTUAL VISIT (OUTPATIENT)
Dept: ONCOLOGY | Facility: CLINIC | Age: 66
End: 2021-01-26
Attending: PHYSICIAN ASSISTANT
Payer: COMMERCIAL

## 2021-01-26 ENCOUNTER — INFUSION THERAPY VISIT (OUTPATIENT)
Dept: ONCOLOGY | Facility: CLINIC | Age: 66
End: 2021-01-26
Attending: INTERNAL MEDICINE
Payer: COMMERCIAL

## 2021-01-26 ENCOUNTER — APPOINTMENT (OUTPATIENT)
Dept: LAB | Facility: CLINIC | Age: 66
End: 2021-01-26
Attending: INTERNAL MEDICINE
Payer: COMMERCIAL

## 2021-01-26 VITALS
RESPIRATION RATE: 16 BRPM | BODY MASS INDEX: 32.76 KG/M2 | SYSTOLIC BLOOD PRESSURE: 116 MMHG | HEART RATE: 80 BPM | DIASTOLIC BLOOD PRESSURE: 69 MMHG | TEMPERATURE: 98.7 F | WEIGHT: 184.9 LBS | OXYGEN SATURATION: 94 %

## 2021-01-26 DIAGNOSIS — C50.919 METASTATIC BREAST CANCER: Primary | ICD-10-CM

## 2021-01-26 DIAGNOSIS — C50.912 MALIGNANT NEOPLASM OF LEFT FEMALE BREAST, UNSPECIFIED ESTROGEN RECEPTOR STATUS, UNSPECIFIED SITE OF BREAST (H): Primary | ICD-10-CM

## 2021-01-26 LAB
ALBUMIN SERPL-MCNC: 3.2 G/DL (ref 3.4–5)
ALP SERPL-CCNC: 66 U/L (ref 40–150)
ALT SERPL W P-5'-P-CCNC: 80 U/L (ref 0–50)
ANION GAP SERPL CALCULATED.3IONS-SCNC: 4 MMOL/L (ref 3–14)
AST SERPL W P-5'-P-CCNC: 51 U/L (ref 0–45)
BASOPHILS # BLD AUTO: 0.1 10E9/L (ref 0–0.2)
BASOPHILS NFR BLD AUTO: 0.7 %
BILIRUB SERPL-MCNC: 0.2 MG/DL (ref 0.2–1.3)
BUN SERPL-MCNC: 13 MG/DL (ref 7–30)
CALCIUM SERPL-MCNC: 9 MG/DL (ref 8.5–10.1)
CANCER AG27-29 SERPL-ACNC: 8 U/ML (ref 0–39)
CEA SERPL-MCNC: 0.6 UG/L (ref 0–2.5)
CHLORIDE SERPL-SCNC: 109 MMOL/L (ref 94–109)
CO2 SERPL-SCNC: 30 MMOL/L (ref 20–32)
CREAT SERPL-MCNC: 0.74 MG/DL (ref 0.52–1.04)
DIFFERENTIAL METHOD BLD: ABNORMAL
EOSINOPHIL # BLD AUTO: 0.3 10E9/L (ref 0–0.7)
EOSINOPHIL NFR BLD AUTO: 4.9 %
ERYTHROCYTE [DISTWIDTH] IN BLOOD BY AUTOMATED COUNT: 13.7 % (ref 10–15)
GFR SERPL CREATININE-BSD FRML MDRD: 85 ML/MIN/{1.73_M2}
GLUCOSE SERPL-MCNC: 94 MG/DL (ref 70–99)
HCT VFR BLD AUTO: 39.4 % (ref 35–47)
HGB BLD-MCNC: 12.2 G/DL (ref 11.7–15.7)
IMM GRANULOCYTES # BLD: 0 10E9/L (ref 0–0.4)
IMM GRANULOCYTES NFR BLD: 0.3 %
LYMPHOCYTES # BLD AUTO: 2.4 10E9/L (ref 0.8–5.3)
LYMPHOCYTES NFR BLD AUTO: 36 %
MCH RBC QN AUTO: 28.6 PG (ref 26.5–33)
MCHC RBC AUTO-ENTMCNC: 31 G/DL (ref 31.5–36.5)
MCV RBC AUTO: 93 FL (ref 78–100)
MONOCYTES # BLD AUTO: 0.5 10E9/L (ref 0–1.3)
MONOCYTES NFR BLD AUTO: 7 %
NEUTROPHILS # BLD AUTO: 3.4 10E9/L (ref 1.6–8.3)
NEUTROPHILS NFR BLD AUTO: 51.1 %
NRBC # BLD AUTO: 0 10*3/UL
NRBC BLD AUTO-RTO: 0 /100
PLATELET # BLD AUTO: 241 10E9/L (ref 150–450)
POTASSIUM SERPL-SCNC: 4 MMOL/L (ref 3.4–5.3)
PROT SERPL-MCNC: 7.7 G/DL (ref 6.8–8.8)
RBC # BLD AUTO: 4.26 10E12/L (ref 3.8–5.2)
SODIUM SERPL-SCNC: 142 MMOL/L (ref 133–144)
WBC # BLD AUTO: 6.7 10E9/L (ref 4–11)

## 2021-01-26 PROCEDURE — 96413 CHEMO IV INFUSION 1 HR: CPT

## 2021-01-26 PROCEDURE — 85025 COMPLETE CBC W/AUTO DIFF WBC: CPT | Performed by: PHYSICIAN ASSISTANT

## 2021-01-26 PROCEDURE — 250N000011 HC RX IP 250 OP 636: Performed by: INTERNAL MEDICINE

## 2021-01-26 PROCEDURE — 82378 CARCINOEMBRYONIC ANTIGEN: CPT | Performed by: PHYSICIAN ASSISTANT

## 2021-01-26 PROCEDURE — 250N000011 HC RX IP 250 OP 636: Performed by: PHYSICIAN ASSISTANT

## 2021-01-26 PROCEDURE — 258N000003 HC RX IP 258 OP 636: Performed by: PHYSICIAN ASSISTANT

## 2021-01-26 PROCEDURE — 99214 OFFICE O/P EST MOD 30 MIN: CPT | Mod: 95 | Performed by: PHYSICIAN ASSISTANT

## 2021-01-26 PROCEDURE — 86300 IMMUNOASSAY TUMOR CA 15-3: CPT | Performed by: PHYSICIAN ASSISTANT

## 2021-01-26 PROCEDURE — 80053 COMPREHEN METABOLIC PANEL: CPT | Performed by: PHYSICIAN ASSISTANT

## 2021-01-26 RX ORDER — HEPARIN SODIUM (PORCINE) LOCK FLUSH IV SOLN 100 UNIT/ML 100 UNIT/ML
5 SOLUTION INTRAVENOUS EVERY 8 HOURS
Status: CANCELLED | OUTPATIENT
Start: 2021-01-26

## 2021-01-26 RX ORDER — DIPHENHYDRAMINE HYDROCHLORIDE 50 MG/ML
50 INJECTION INTRAMUSCULAR; INTRAVENOUS
Status: CANCELLED
Start: 2021-01-26

## 2021-01-26 RX ORDER — HEPARIN SODIUM (PORCINE) LOCK FLUSH IV SOLN 100 UNIT/ML 100 UNIT/ML
5 SOLUTION INTRAVENOUS EVERY 8 HOURS
Status: DISCONTINUED | OUTPATIENT
Start: 2021-01-26 | End: 2021-01-26 | Stop reason: HOSPADM

## 2021-01-26 RX ORDER — ACETAMINOPHEN 325 MG/1
650 TABLET ORAL
Status: CANCELLED | OUTPATIENT
Start: 2021-01-26

## 2021-01-26 RX ORDER — DIPHENHYDRAMINE HCL 25 MG
50 CAPSULE ORAL ONCE
Status: CANCELLED
Start: 2021-01-26

## 2021-01-26 RX ORDER — METHYLPREDNISOLONE SODIUM SUCCINATE 125 MG/2ML
125 INJECTION, POWDER, LYOPHILIZED, FOR SOLUTION INTRAMUSCULAR; INTRAVENOUS
Status: CANCELLED
Start: 2021-01-26

## 2021-01-26 RX ORDER — MEPERIDINE HYDROCHLORIDE 25 MG/ML
25 INJECTION INTRAMUSCULAR; INTRAVENOUS; SUBCUTANEOUS EVERY 30 MIN PRN
Status: CANCELLED | OUTPATIENT
Start: 2021-01-26

## 2021-01-26 RX ORDER — EPINEPHRINE 0.3 MG/.3ML
0.3 INJECTION SUBCUTANEOUS EVERY 5 MIN PRN
Status: CANCELLED | OUTPATIENT
Start: 2021-01-26

## 2021-01-26 RX ORDER — HEPARIN SODIUM (PORCINE) LOCK FLUSH IV SOLN 100 UNIT/ML 100 UNIT/ML
5 SOLUTION INTRAVENOUS
Status: COMPLETED | OUTPATIENT
Start: 2021-01-26 | End: 2021-01-26

## 2021-01-26 RX ORDER — EPINEPHRINE 1 MG/ML
0.3 INJECTION, SOLUTION INTRAMUSCULAR; SUBCUTANEOUS EVERY 5 MIN PRN
Status: CANCELLED | OUTPATIENT
Start: 2021-01-26

## 2021-01-26 RX ORDER — ALBUTEROL SULFATE 90 UG/1
1-2 AEROSOL, METERED RESPIRATORY (INHALATION)
Status: CANCELLED
Start: 2021-01-26

## 2021-01-26 RX ORDER — SODIUM CHLORIDE 9 MG/ML
1000 INJECTION, SOLUTION INTRAVENOUS CONTINUOUS PRN
Status: CANCELLED
Start: 2021-01-26

## 2021-01-26 RX ORDER — LORAZEPAM 2 MG/ML
0.5 INJECTION INTRAMUSCULAR EVERY 4 HOURS PRN
Status: CANCELLED
Start: 2021-01-26

## 2021-01-26 RX ORDER — ALBUTEROL SULFATE 0.83 MG/ML
2.5 SOLUTION RESPIRATORY (INHALATION)
Status: CANCELLED | OUTPATIENT
Start: 2021-01-26

## 2021-01-26 RX ADMIN — Medication 5 ML: at 10:03

## 2021-01-26 RX ADMIN — Medication 5 ML: at 11:21

## 2021-01-26 RX ADMIN — TRASTUZUMAB 450 MG: 150 INJECTION, POWDER, LYOPHILIZED, FOR SOLUTION INTRAVENOUS at 10:45

## 2021-01-26 ASSESSMENT — PAIN SCALES - GENERAL: PAINLEVEL: NO PAIN (0)

## 2021-01-26 NOTE — PATIENT INSTRUCTIONS
Contact Numbers  [facility]: [phone] (for symptom and scheduling needs)    Please call the [facility] Triage line if you experience a temperature greater than or equal to 100.4, shaking chills, have uncontrolled nausea, vomiting and/or diarrhea, dizziness, shortness of breath, chest pain, bleeding, unexplained bruising, or if you have any other new/concerning symptoms, questions or concerns.     If you are having any concerning symptoms or wish to speak to a provider before your next infusion visit, please call your care coordinator or triage to notify them so we can adequately serve you.     If you need a refill on a narcotic prescription or other medication, please call triage before your infusion appointment.

## 2021-01-26 NOTE — PROGRESS NOTES
Infusion Nursing Note:  Hoda Brush presents today for Cycle 58 Day 1 Herceptin.    Patient seen by provider today: Yes: virtual visit with SEMAJ Crockett prior to infusion    Note: Patient has no new concerns today.    Intravenous Access:  Implanted Port.    Treatment Conditions:  Lab Results   Component Value Date    HGB 12.2 01/26/2021     Lab Results   Component Value Date    WBC 6.7 01/26/2021      Lab Results   Component Value Date    ANEU 3.4 01/26/2021     Lab Results   Component Value Date     01/26/2021      Lab Results   Component Value Date     01/26/2021                   Lab Results   Component Value Date    POTASSIUM 4.0 01/26/2021             Lab Results   Component Value Date    CR 0.74 01/26/2021                   Lab Results   Component Value Date    TAYLER 9.0 01/26/2021                Lab Results   Component Value Date    BILITOTAL 0.2 01/26/2021           Lab Results   Component Value Date    ALBUMIN 3.2 01/26/2021                    Lab Results   Component Value Date    ALT 80 01/26/2021           Lab Results   Component Value Date    AST 51 01/26/2021       Results reviewed, labs MET treatment parameters, ok to proceed with treatment.    Last echo 11/3/2020 EF 55-65%       Post Infusion Assessment:  Patient tolerated infusion without incident.  Blood return noted pre and post infusion.  Site patent and intact, free from redness, edema or discomfort.  No evidence of extravasations.  Access discontinued per protocol.       Discharge Plan:   Patient declined prescription refills.  Discharge instructions reviewed with: Patient.  Patient and/or family verbalized understanding of discharge instructions and all questions answered.  AVS to patient via PEERT.  Patient will return 2/16/21 for next appointment.   Patient discharged in stable condition accompanied by: self.  Departure Mode: Ambulatory.    Shannan Stewart RN

## 2021-01-26 NOTE — LETTER
1/26/2021         RE: Hoda Brush  [address]  [place] [address]        Dear Colleague,    Thank you for referring your patient, Hoda Brush, to the [facility]. Please see a copy of my visit note below.    Hoda is a 65 year old who is being evaluated via a billable video visit.      How would you like to obtain your AVS? MyChart  If the video visit is dropped, the invitation should be resent by: Text to cell phone: [phone]  Will anyone else be joining your video visit? No      Video Start Time: 7:08 am  Video-Visit Details    Type of service:  Video Visit    Video End Time:7:14 AM    Originating Location (pt. Location): Home    Distant Location (provider location):  [facility]     Platform used for Video Visit: [employer] ([employer])      Jan 26, 2021     History of Present Illness: Hoda Brush is a 65 year old female with ER positive, HER2 positive metastatic left breast cancer (bone).     TREATMENT HISTORY:  A. Initial diagnosis with metastatic breast cancer in [facility].  Neoadjuvant CAF x 6.   B. Left mastectomy. Left axillary node dissection.  C.  Radiation in 1 dose to R iliac region. g Gy.  C. Herceptin for 2 years taxane for a prescribed course then stopped, monthly zoledronic acid.  She had 2 years of Herceptin with tamoxifen added after chemotherapy.  D.  Tamoxifen alone and zoledronic acid every 3 months.  E.  [place]  We restarted Herceptin every 3 weeks and continued tamoxifen. Bone targeted agent changed to denosumab every 6 weeks.   F. Tamoxifen changed to letrozole 10/8/19.      She is from [facility], [facility] from [facility], and [place]  She lives with her daughter and is here for continuation of every 3 week Herceptin, which she receives along with tamoxifen.  Her tumor is ER positive, DE positive, HER-2 positive.  She had metastatic disease at the time of presentation with  bone-only metastases by report.  She presented with metastatic disease in 02/2014.  Staging was initially bone-only metastases by report.  She did her staging in 02/2014 that showed that she had stage IV disease, T4N2M1 invasive ductal carcinoma of the left breast.  She had a right hip metastasis.  She underwent neoadjuvant CAF, left mastectomy, left axillary lymph node dissection and radiation.  She had radiation of the right iliac region where she had metastatic disease.  Pathology report from [facility] shows the tumor to be positive for ER in 75% of the cells, positive for CO in 40% of the cells, and the HER-2 was 3+ positive by immunohistochemistry.  The Ki-67 was 20%.  She had no evidence of nonbone metastatic disease on her PET/CT scan from 08/2017.     Had acute abdominal pain and N/V and found to have cholecystitis in the ER. Had cholecystectomy on 8/31. Herceptin resumed on 9/13/19. Tamoxifen changed to letrozole on 10/8/19. Last restaging on 12/14/20 was stable.      Interval History:   Hoda is feeling well today. She has no questions or concerns besides wondering about the vaccine. She continues to tolerate treatment remarkably well without any side effects. No hot flashes, headaches, joint or body pain, SOB, fevers/chills, cough, CP, or swelling. She has been eating and drinking well.      Current Outpatient Medications   Medication     calcium carbonate (OS-TAYLER) 500 MG tablet     ibuprofen (ADVIL/MOTRIN) 600 MG tablet     letrozole (FEMARA) 2.5 MG tablet     omeprazole 20 MG tablet     order for DME     SENNA-docusate sodium (SENNA S) 8.6-50 MG tablet     VITAMIN D, CHOLECALCIFEROL, PO     acetaminophen (TYLENOL) 500 MG tablet     albuterol (PROAIR HFA/PROVENTIL HFA/VENTOLIN HFA) 108 (90 Base) MCG/ACT inhaler     chlorhexidine (PERIDEX) 0.12 % solution     olopatadine (PATANOL) 0.1 % ophthalmic solution     order for DME     No current facility-administered medications for this visit.       Facility-Administered Medications Ordered in Other Visits   Medication     heparin 100 UNIT/ML injection 5 mL     sodium chloride (PF) 0.9% PF flush 20 mL      No Known Allergies          PHYSICAL EXAM:     Objective:  General: patient sounds in no audible acute distress, alert and oriented, speech clear and fluid  Resp: Speaking in full sentences, no audible respiratory distress, no cough, no audible wheeze  Psych: able to articulate logical thoughts, able to abstract reason, no tangential thoughts, no hallucinations or delusions  Her affect is normal     Labs: To be drawn later today           Assessment and Plan:      1. Metastatic breast cancer, ER, [place], HER2+ positive:   Was last treated in [facility] with Herceptin. We have continued Herceptin every 3 weeks as well as daily letrozole. CT CAP and echo last month were stable. Tumor markers have been stable.   - will continue Herceptin today.   - continue letrozole  - scans and ECHOs every 3 months     2. Bone metastasis: Has previously been on xgeva every 6 weeks. On calcium + vitamin D.  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 This was biopsy proven. Note from oral surgery was scanned in chart. Recent dentistry visits from 2020 describe ongoing bone exposure, thus will discontinue Xgeva permanently for now.       3. Transaminitis: Mild. US showed fatty liver, she is walking twice a day for exercise.     4. GERD: Continue omeprazole daily. No current symptoms.    Anne Lea PA-C                Again, thank you for allowing me to participate in the care of your patient.        Sincerely,        Anne Lea PA-C

## 2021-02-13 RX ORDER — SODIUM CHLORIDE 9 MG/ML
1000 INJECTION, SOLUTION INTRAVENOUS CONTINUOUS PRN
Status: CANCELLED
Start: 2021-02-16

## 2021-02-13 RX ORDER — ACETAMINOPHEN 325 MG/1
650 TABLET ORAL
Status: CANCELLED | OUTPATIENT
Start: 2021-02-16

## 2021-02-13 RX ORDER — MEPERIDINE HYDROCHLORIDE 25 MG/ML
25 INJECTION INTRAMUSCULAR; INTRAVENOUS; SUBCUTANEOUS EVERY 30 MIN PRN
Status: CANCELLED | OUTPATIENT
Start: 2021-02-16

## 2021-02-13 RX ORDER — EPINEPHRINE 1 MG/ML
0.3 INJECTION, SOLUTION INTRAMUSCULAR; SUBCUTANEOUS EVERY 5 MIN PRN
Status: CANCELLED | OUTPATIENT
Start: 2021-02-16

## 2021-02-13 RX ORDER — METHYLPREDNISOLONE SODIUM SUCCINATE 125 MG/2ML
125 INJECTION, POWDER, LYOPHILIZED, FOR SOLUTION INTRAMUSCULAR; INTRAVENOUS
Status: CANCELLED
Start: 2021-02-16

## 2021-02-13 RX ORDER — HEPARIN SODIUM (PORCINE) LOCK FLUSH IV SOLN 100 UNIT/ML 100 UNIT/ML
5 SOLUTION INTRAVENOUS EVERY 8 HOURS
Status: CANCELLED | OUTPATIENT
Start: 2021-02-16

## 2021-02-13 RX ORDER — DIPHENHYDRAMINE HYDROCHLORIDE 50 MG/ML
50 INJECTION INTRAMUSCULAR; INTRAVENOUS
Status: CANCELLED
Start: 2021-02-16

## 2021-02-13 RX ORDER — ALBUTEROL SULFATE 90 UG/1
1-2 AEROSOL, METERED RESPIRATORY (INHALATION)
Status: CANCELLED
Start: 2021-02-16

## 2021-02-13 RX ORDER — DIPHENHYDRAMINE HCL 25 MG
50 CAPSULE ORAL ONCE
Status: CANCELLED
Start: 2021-02-16

## 2021-02-13 RX ORDER — EPINEPHRINE 0.3 MG/.3ML
0.3 INJECTION SUBCUTANEOUS EVERY 5 MIN PRN
Status: CANCELLED | OUTPATIENT
Start: 2021-02-16

## 2021-02-13 RX ORDER — ALBUTEROL SULFATE 0.83 MG/ML
2.5 SOLUTION RESPIRATORY (INHALATION)
Status: CANCELLED | OUTPATIENT
Start: 2021-02-16

## 2021-02-13 RX ORDER — LORAZEPAM 2 MG/ML
0.5 INJECTION INTRAMUSCULAR EVERY 4 HOURS PRN
Status: CANCELLED
Start: 2021-02-16

## 2021-02-16 ENCOUNTER — APPOINTMENT (OUTPATIENT)
Dept: LAB | Facility: CLINIC | Age: 66
End: 2021-02-16
Attending: INTERNAL MEDICINE
Payer: COMMERCIAL

## 2021-02-16 ENCOUNTER — ANCILLARY PROCEDURE (OUTPATIENT)
Dept: CARDIOLOGY | Facility: CLINIC | Age: 66
End: 2021-02-16
Attending: PHYSICIAN ASSISTANT
Payer: COMMERCIAL

## 2021-02-16 ENCOUNTER — INFUSION THERAPY VISIT (OUTPATIENT)
Dept: ONCOLOGY | Facility: CLINIC | Age: 66
End: 2021-02-16
Attending: INTERNAL MEDICINE
Payer: COMMERCIAL

## 2021-02-16 VITALS
SYSTOLIC BLOOD PRESSURE: 116 MMHG | RESPIRATION RATE: 18 BRPM | DIASTOLIC BLOOD PRESSURE: 79 MMHG | OXYGEN SATURATION: 95 % | TEMPERATURE: 98.7 F | BODY MASS INDEX: 32.73 KG/M2 | WEIGHT: 184.7 LBS | HEART RATE: 87 BPM

## 2021-02-16 DIAGNOSIS — C50.912 MALIGNANT NEOPLASM OF LEFT FEMALE BREAST, UNSPECIFIED ESTROGEN RECEPTOR STATUS, UNSPECIFIED SITE OF BREAST (H): Primary | ICD-10-CM

## 2021-02-16 DIAGNOSIS — Z91.89 AT RISK FOR CARDIOMYOPATHY: ICD-10-CM

## 2021-02-16 DIAGNOSIS — C50.919 METASTATIC BREAST CANCER: ICD-10-CM

## 2021-02-16 LAB
ALBUMIN SERPL-MCNC: 3.4 G/DL (ref 3.4–5)
ALP SERPL-CCNC: 64 U/L (ref 40–150)
ALT SERPL W P-5'-P-CCNC: 88 U/L (ref 0–50)
ANION GAP SERPL CALCULATED.3IONS-SCNC: 6 MMOL/L (ref 3–14)
AST SERPL W P-5'-P-CCNC: 65 U/L (ref 0–45)
BASOPHILS # BLD AUTO: 0 10E9/L (ref 0–0.2)
BASOPHILS NFR BLD AUTO: 0.5 %
BILIRUB SERPL-MCNC: 0.2 MG/DL (ref 0.2–1.3)
BUN SERPL-MCNC: 16 MG/DL (ref 7–30)
CALCIUM SERPL-MCNC: 8.9 MG/DL (ref 8.5–10.1)
CANCER AG27-29 SERPL-ACNC: 6 U/ML (ref 0–39)
CEA SERPL-MCNC: <0.5 UG/L (ref 0–2.5)
CHLORIDE SERPL-SCNC: 108 MMOL/L (ref 94–109)
CO2 SERPL-SCNC: 29 MMOL/L (ref 20–32)
CREAT SERPL-MCNC: 0.76 MG/DL (ref 0.52–1.04)
DIFFERENTIAL METHOD BLD: ABNORMAL
EOSINOPHIL # BLD AUTO: 0.3 10E9/L (ref 0–0.7)
EOSINOPHIL NFR BLD AUTO: 3.5 %
ERYTHROCYTE [DISTWIDTH] IN BLOOD BY AUTOMATED COUNT: 13.5 % (ref 10–15)
GFR SERPL CREATININE-BSD FRML MDRD: 83 ML/MIN/{1.73_M2}
GLUCOSE SERPL-MCNC: 83 MG/DL (ref 70–99)
HCT VFR BLD AUTO: 38.1 % (ref 35–47)
HGB BLD-MCNC: 11.8 G/DL (ref 11.7–15.7)
IMM GRANULOCYTES # BLD: 0 10E9/L (ref 0–0.4)
IMM GRANULOCYTES NFR BLD: 0.2 %
LYMPHOCYTES # BLD AUTO: 2.9 10E9/L (ref 0.8–5.3)
LYMPHOCYTES NFR BLD AUTO: 33.7 %
MCH RBC QN AUTO: 29 PG (ref 26.5–33)
MCHC RBC AUTO-ENTMCNC: 31 G/DL (ref 31.5–36.5)
MCV RBC AUTO: 94 FL (ref 78–100)
MONOCYTES # BLD AUTO: 0.6 10E9/L (ref 0–1.3)
MONOCYTES NFR BLD AUTO: 6.7 %
NEUTROPHILS # BLD AUTO: 4.7 10E9/L (ref 1.6–8.3)
NEUTROPHILS NFR BLD AUTO: 55.4 %
NRBC # BLD AUTO: 0 10*3/UL
NRBC BLD AUTO-RTO: 0 /100
PLATELET # BLD AUTO: 222 10E9/L (ref 150–450)
POTASSIUM SERPL-SCNC: 3.8 MMOL/L (ref 3.4–5.3)
PROT SERPL-MCNC: 7.6 G/DL (ref 6.8–8.8)
RBC # BLD AUTO: 4.07 10E12/L (ref 3.8–5.2)
SODIUM SERPL-SCNC: 142 MMOL/L (ref 133–144)
WBC # BLD AUTO: 8.5 10E9/L (ref 4–11)

## 2021-02-16 PROCEDURE — 250N000011 HC RX IP 250 OP 636: Performed by: INTERNAL MEDICINE

## 2021-02-16 PROCEDURE — 86300 IMMUNOASSAY TUMOR CA 15-3: CPT | Performed by: INTERNAL MEDICINE

## 2021-02-16 PROCEDURE — 258N000003 HC RX IP 258 OP 636: Performed by: INTERNAL MEDICINE

## 2021-02-16 PROCEDURE — 80053 COMPREHEN METABOLIC PANEL: CPT | Performed by: INTERNAL MEDICINE

## 2021-02-16 PROCEDURE — 96413 CHEMO IV INFUSION 1 HR: CPT

## 2021-02-16 PROCEDURE — 85025 COMPLETE CBC W/AUTO DIFF WBC: CPT | Performed by: INTERNAL MEDICINE

## 2021-02-16 PROCEDURE — 93306 TTE W/DOPPLER COMPLETE: CPT | Performed by: STUDENT IN AN ORGANIZED HEALTH CARE EDUCATION/TRAINING PROGRAM

## 2021-02-16 PROCEDURE — 82378 CARCINOEMBRYONIC ANTIGEN: CPT | Performed by: INTERNAL MEDICINE

## 2021-02-16 RX ORDER — HEPARIN SODIUM (PORCINE) LOCK FLUSH IV SOLN 100 UNIT/ML 100 UNIT/ML
5 SOLUTION INTRAVENOUS EVERY 8 HOURS
Status: DISCONTINUED | OUTPATIENT
Start: 2021-02-16 | End: 2021-02-16 | Stop reason: HOSPADM

## 2021-02-16 RX ORDER — LANOLIN ALCOHOL/MO/W.PET/CERES
3 CREAM (GRAM) TOPICAL
COMMUNITY
End: 2023-07-11

## 2021-02-16 RX ADMIN — TRASTUZUMAB 450 MG: 150 INJECTION, POWDER, LYOPHILIZED, FOR SOLUTION INTRAVENOUS at 15:52

## 2021-02-16 RX ADMIN — Medication 5 ML: at 16:29

## 2021-02-16 RX ADMIN — Medication 5 ML: at 14:42

## 2021-02-16 ASSESSMENT — PAIN SCALES - GENERAL: PAINLEVEL: NO PAIN (0)

## 2021-02-16 NOTE — PROGRESS NOTES
Infusion Nursing Note:  Hoda Brush presents today for Cycle 59 Day 1 Herceptin.    Patient seen by provider today: No    Note: Patient feels well today.  Denies fevers, cough or shortness of breath.    Intravenous Access:  Implanted Port.    Treatment Conditions:  Lab Results   Component Value Date    HGB 11.8 02/16/2021     Lab Results   Component Value Date    WBC 8.5 02/16/2021      Lab Results   Component Value Date    ANEU 4.7 02/16/2021     Lab Results   Component Value Date     02/16/2021      Lab Results   Component Value Date     02/16/2021                   Lab Results   Component Value Date    POTASSIUM 3.8 02/16/2021           No results found for: MAG         Lab Results   Component Value Date    CR 0.76 02/16/2021                   Lab Results   Component Value Date    TAYLER 8.9 02/16/2021                Lab Results   Component Value Date    BILITOTAL 0.2 02/16/2021           Lab Results   Component Value Date    ALBUMIN 3.4 02/16/2021                    Lab Results   Component Value Date    ALT 88 02/16/2021           Lab Results   Component Value Date    AST 65 02/16/2021       Results reviewed, labs MET treatment parameters, ok to proceed with treatment.  ECHO/MUGA completed 2/16/21  EF 60-65%.      Post Infusion Assessment:  Patient tolerated infusion without incident.  Blood return noted pre and post infusion.  Site patent and intact, free from redness, edema or discomfort.  No evidence of extravasations.  Access discontinued per protocol.       Discharge Plan:   Patient declined prescription refills.  Discharge instructions reviewed with: Patient.  Patient and/or family verbalized understanding of discharge instructions and all questions answered.  AVS to patient via [employer].  Patient will return 3/9/21 for next appointment.   Patient discharged in stable condition accompanied by: self.  Departure Mode: Ambulatory.    Shannan Stewart RN

## 2021-02-16 NOTE — PATIENT INSTRUCTIONS
Contact Numbers  [facility]: [phone] (for symptom and scheduling needs)    Please call the [facility] Triage line if you experience a temperature greater than or equal to 100.4, shaking chills, have uncontrolled nausea, vomiting and/or diarrhea, dizziness, shortness of breath, chest pain, bleeding, unexplained bruising, or if you have any other new/concerning symptoms, questions or concerns.     If you are having any concerning symptoms or wish to speak to a provider before your next infusion visit, please call your care coordinator or triage to notify them so we can adequately serve you.     If you need a refill on a narcotic prescription or other medication, please call triage before your infusion appointment.

## 2021-03-05 ENCOUNTER — IMMUNIZATION (OUTPATIENT)
Dept: NURSING | Facility: CLINIC | Age: 66
End: 2021-03-05
Payer: MEDICAID

## 2021-03-05 PROCEDURE — T1013 SIGN LANG/ORAL INTERPRETER: HCPCS | Mod: GT

## 2021-03-05 PROCEDURE — 91303 PR COVID VAC JANSSEN AD26 0.5ML: CPT

## 2021-03-05 PROCEDURE — 0031A PR COVID VAC JANSSEN AD26 0.5ML: CPT

## 2021-03-09 ENCOUNTER — VIRTUAL VISIT (OUTPATIENT)
Dept: ONCOLOGY | Facility: CLINIC | Age: 66
End: 2021-03-09
Attending: PHYSICIAN ASSISTANT
Payer: MEDICAID

## 2021-03-09 ENCOUNTER — INFUSION THERAPY VISIT (OUTPATIENT)
Dept: ONCOLOGY | Facility: CLINIC | Age: 66
End: 2021-03-09
Attending: INTERNAL MEDICINE
Payer: MEDICAID

## 2021-03-09 ENCOUNTER — APPOINTMENT (OUTPATIENT)
Dept: LAB | Facility: CLINIC | Age: 66
End: 2021-03-09
Attending: INTERNAL MEDICINE
Payer: MEDICAID

## 2021-03-09 VITALS
RESPIRATION RATE: 16 BRPM | SYSTOLIC BLOOD PRESSURE: 117 MMHG | TEMPERATURE: 98.1 F | HEART RATE: 79 BPM | BODY MASS INDEX: 32.18 KG/M2 | OXYGEN SATURATION: 94 % | WEIGHT: 181.6 LBS | DIASTOLIC BLOOD PRESSURE: 71 MMHG

## 2021-03-09 DIAGNOSIS — C79.51 BONE METASTASIS: ICD-10-CM

## 2021-03-09 DIAGNOSIS — R79.89 ELEVATED LFTS: ICD-10-CM

## 2021-03-09 DIAGNOSIS — C50.919 METASTATIC BREAST CANCER: Primary | ICD-10-CM

## 2021-03-09 DIAGNOSIS — C50.912 MALIGNANT NEOPLASM OF LEFT FEMALE BREAST, UNSPECIFIED ESTROGEN RECEPTOR STATUS, UNSPECIFIED SITE OF BREAST (H): Primary | ICD-10-CM

## 2021-03-09 DIAGNOSIS — Z71.89 COUNSELED ABOUT COVID-19 VIRUS INFECTION: ICD-10-CM

## 2021-03-09 LAB
ALBUMIN SERPL-MCNC: 3.3 G/DL (ref 3.4–5)
ALP SERPL-CCNC: 74 U/L (ref 40–150)
ALT SERPL W P-5'-P-CCNC: 109 U/L (ref 0–50)
ANION GAP SERPL CALCULATED.3IONS-SCNC: 5 MMOL/L (ref 3–14)
AST SERPL W P-5'-P-CCNC: 69 U/L (ref 0–45)
BASOPHILS # BLD AUTO: 0 10E9/L (ref 0–0.2)
BASOPHILS NFR BLD AUTO: 0.5 %
BILIRUB SERPL-MCNC: 0.3 MG/DL (ref 0.2–1.3)
BUN SERPL-MCNC: 12 MG/DL (ref 7–30)
CALCIUM SERPL-MCNC: 8.8 MG/DL (ref 8.5–10.1)
CANCER AG27-29 SERPL-ACNC: 11 U/ML (ref 0–39)
CEA SERPL-MCNC: <0.5 UG/L (ref 0–2.5)
CHLORIDE SERPL-SCNC: 108 MMOL/L (ref 94–109)
CO2 SERPL-SCNC: 29 MMOL/L (ref 20–32)
CREAT SERPL-MCNC: 0.78 MG/DL (ref 0.52–1.04)
DIFFERENTIAL METHOD BLD: NORMAL
EOSINOPHIL # BLD AUTO: 0.3 10E9/L (ref 0–0.7)
EOSINOPHIL NFR BLD AUTO: 5.2 %
ERYTHROCYTE [DISTWIDTH] IN BLOOD BY AUTOMATED COUNT: 13.9 % (ref 10–15)
GFR SERPL CREATININE-BSD FRML MDRD: 80 ML/MIN/{1.73_M2}
GLUCOSE SERPL-MCNC: 97 MG/DL (ref 70–99)
HCT VFR BLD AUTO: 38.7 % (ref 35–47)
HGB BLD-MCNC: 12.2 G/DL (ref 11.7–15.7)
IMM GRANULOCYTES # BLD: 0 10E9/L (ref 0–0.4)
IMM GRANULOCYTES NFR BLD: 0.2 %
LYMPHOCYTES # BLD AUTO: 2.5 10E9/L (ref 0.8–5.3)
LYMPHOCYTES NFR BLD AUTO: 40.5 %
MCH RBC QN AUTO: 29.1 PG (ref 26.5–33)
MCHC RBC AUTO-ENTMCNC: 31.5 G/DL (ref 31.5–36.5)
MCV RBC AUTO: 92 FL (ref 78–100)
MONOCYTES # BLD AUTO: 0.5 10E9/L (ref 0–1.3)
MONOCYTES NFR BLD AUTO: 7.5 %
NEUTROPHILS # BLD AUTO: 2.8 10E9/L (ref 1.6–8.3)
NEUTROPHILS NFR BLD AUTO: 46.1 %
NRBC # BLD AUTO: 0 10*3/UL
NRBC BLD AUTO-RTO: 0 /100
PLATELET # BLD AUTO: 233 10E9/L (ref 150–450)
POTASSIUM SERPL-SCNC: 4 MMOL/L (ref 3.4–5.3)
PROT SERPL-MCNC: 7.6 G/DL (ref 6.8–8.8)
RBC # BLD AUTO: 4.19 10E12/L (ref 3.8–5.2)
SODIUM SERPL-SCNC: 142 MMOL/L (ref 133–144)
WBC # BLD AUTO: 6.1 10E9/L (ref 4–11)

## 2021-03-09 PROCEDURE — T1013 SIGN LANG/ORAL INTERPRETER: HCPCS | Mod: GT

## 2021-03-09 PROCEDURE — T1013 SIGN LANG/ORAL INTERPRETER: HCPCS | Mod: U4

## 2021-03-09 PROCEDURE — 96413 CHEMO IV INFUSION 1 HR: CPT

## 2021-03-09 PROCEDURE — 82378 CARCINOEMBRYONIC ANTIGEN: CPT | Performed by: NURSE PRACTITIONER

## 2021-03-09 PROCEDURE — 99214 OFFICE O/P EST MOD 30 MIN: CPT | Mod: 95 | Performed by: NURSE PRACTITIONER

## 2021-03-09 PROCEDURE — 80053 COMPREHEN METABOLIC PANEL: CPT | Performed by: NURSE PRACTITIONER

## 2021-03-09 PROCEDURE — T1013 SIGN LANG/ORAL INTERPRETER: HCPCS | Mod: U4,GT | Performed by: NURSE PRACTITIONER

## 2021-03-09 PROCEDURE — 86300 IMMUNOASSAY TUMOR CA 15-3: CPT | Performed by: NURSE PRACTITIONER

## 2021-03-09 PROCEDURE — 258N000003 HC RX IP 258 OP 636: Performed by: INTERNAL MEDICINE

## 2021-03-09 PROCEDURE — 250N000011 HC RX IP 250 OP 636: Performed by: INTERNAL MEDICINE

## 2021-03-09 PROCEDURE — 250N000011 HC RX IP 250 OP 636: Mod: GT | Performed by: NURSE PRACTITIONER

## 2021-03-09 PROCEDURE — 85025 COMPLETE CBC W/AUTO DIFF WBC: CPT | Performed by: NURSE PRACTITIONER

## 2021-03-09 RX ORDER — ALBUTEROL SULFATE 0.83 MG/ML
2.5 SOLUTION RESPIRATORY (INHALATION)
Status: CANCELLED | OUTPATIENT
Start: 2021-05-11

## 2021-03-09 RX ORDER — MEPERIDINE HYDROCHLORIDE 25 MG/ML
25 INJECTION INTRAMUSCULAR; INTRAVENOUS; SUBCUTANEOUS EVERY 30 MIN PRN
Status: CANCELLED | OUTPATIENT
Start: 2021-05-11

## 2021-03-09 RX ORDER — SODIUM CHLORIDE 9 MG/ML
1000 INJECTION, SOLUTION INTRAVENOUS CONTINUOUS PRN
Status: CANCELLED
Start: 2021-03-09

## 2021-03-09 RX ORDER — METHYLPREDNISOLONE SODIUM SUCCINATE 125 MG/2ML
125 INJECTION, POWDER, LYOPHILIZED, FOR SOLUTION INTRAMUSCULAR; INTRAVENOUS
Status: CANCELLED
Start: 2021-05-11

## 2021-03-09 RX ORDER — HEPARIN SODIUM (PORCINE) LOCK FLUSH IV SOLN 100 UNIT/ML 100 UNIT/ML
5 SOLUTION INTRAVENOUS
Status: COMPLETED | OUTPATIENT
Start: 2021-03-09 | End: 2021-03-09

## 2021-03-09 RX ORDER — HEPARIN SODIUM (PORCINE) LOCK FLUSH IV SOLN 100 UNIT/ML 100 UNIT/ML
5 SOLUTION INTRAVENOUS EVERY 8 HOURS
Status: CANCELLED | OUTPATIENT
Start: 2021-04-20

## 2021-03-09 RX ORDER — HEPARIN SODIUM (PORCINE) LOCK FLUSH IV SOLN 100 UNIT/ML 100 UNIT/ML
5 SOLUTION INTRAVENOUS EVERY 8 HOURS
Status: CANCELLED | OUTPATIENT
Start: 2021-05-11

## 2021-03-09 RX ORDER — DIPHENHYDRAMINE HYDROCHLORIDE 50 MG/ML
50 INJECTION INTRAMUSCULAR; INTRAVENOUS
Status: CANCELLED
Start: 2021-04-20

## 2021-03-09 RX ORDER — ALBUTEROL SULFATE 0.83 MG/ML
2.5 SOLUTION RESPIRATORY (INHALATION)
Status: CANCELLED | OUTPATIENT
Start: 2021-03-09

## 2021-03-09 RX ORDER — DIPHENHYDRAMINE HCL 25 MG
50 CAPSULE ORAL ONCE
Status: CANCELLED
Start: 2021-04-20

## 2021-03-09 RX ORDER — MEPERIDINE HYDROCHLORIDE 25 MG/ML
25 INJECTION INTRAMUSCULAR; INTRAVENOUS; SUBCUTANEOUS EVERY 30 MIN PRN
Status: CANCELLED | OUTPATIENT
Start: 2021-03-30

## 2021-03-09 RX ORDER — ALBUTEROL SULFATE 0.83 MG/ML
2.5 SOLUTION RESPIRATORY (INHALATION)
Status: CANCELLED | OUTPATIENT
Start: 2021-04-20

## 2021-03-09 RX ORDER — EPINEPHRINE 1 MG/ML
0.3 INJECTION, SOLUTION INTRAMUSCULAR; SUBCUTANEOUS EVERY 5 MIN PRN
Status: CANCELLED | OUTPATIENT
Start: 2021-03-30

## 2021-03-09 RX ORDER — ALBUTEROL SULFATE 90 UG/1
1-2 AEROSOL, METERED RESPIRATORY (INHALATION)
Status: CANCELLED
Start: 2021-03-30

## 2021-03-09 RX ORDER — SODIUM CHLORIDE 9 MG/ML
1000 INJECTION, SOLUTION INTRAVENOUS CONTINUOUS PRN
Status: CANCELLED
Start: 2021-04-20

## 2021-03-09 RX ORDER — LORAZEPAM 2 MG/ML
0.5 INJECTION INTRAMUSCULAR EVERY 4 HOURS PRN
Status: CANCELLED
Start: 2021-03-09

## 2021-03-09 RX ORDER — EPINEPHRINE 0.3 MG/.3ML
0.3 INJECTION SUBCUTANEOUS EVERY 5 MIN PRN
Status: CANCELLED | OUTPATIENT
Start: 2021-03-30

## 2021-03-09 RX ORDER — DIPHENHYDRAMINE HCL 25 MG
50 CAPSULE ORAL ONCE
Status: CANCELLED
Start: 2021-03-30

## 2021-03-09 RX ORDER — SODIUM CHLORIDE 9 MG/ML
1000 INJECTION, SOLUTION INTRAVENOUS CONTINUOUS PRN
Status: CANCELLED
Start: 2021-05-11

## 2021-03-09 RX ORDER — EPINEPHRINE 1 MG/ML
0.3 INJECTION, SOLUTION INTRAMUSCULAR; SUBCUTANEOUS EVERY 5 MIN PRN
Status: CANCELLED | OUTPATIENT
Start: 2021-04-20

## 2021-03-09 RX ORDER — DIPHENHYDRAMINE HYDROCHLORIDE 50 MG/ML
50 INJECTION INTRAMUSCULAR; INTRAVENOUS
Status: CANCELLED
Start: 2021-03-09

## 2021-03-09 RX ORDER — HEPARIN SODIUM (PORCINE) LOCK FLUSH IV SOLN 100 UNIT/ML 100 UNIT/ML
5 SOLUTION INTRAVENOUS EVERY 8 HOURS
Status: CANCELLED | OUTPATIENT
Start: 2021-03-30

## 2021-03-09 RX ORDER — METHYLPREDNISOLONE SODIUM SUCCINATE 125 MG/2ML
125 INJECTION, POWDER, LYOPHILIZED, FOR SOLUTION INTRAMUSCULAR; INTRAVENOUS
Status: CANCELLED
Start: 2021-03-30

## 2021-03-09 RX ORDER — SODIUM CHLORIDE 9 MG/ML
1000 INJECTION, SOLUTION INTRAVENOUS CONTINUOUS PRN
Status: CANCELLED
Start: 2021-03-30

## 2021-03-09 RX ORDER — DIPHENHYDRAMINE HYDROCHLORIDE 50 MG/ML
50 INJECTION INTRAMUSCULAR; INTRAVENOUS
Status: CANCELLED
Start: 2021-03-30

## 2021-03-09 RX ORDER — ACETAMINOPHEN 325 MG/1
650 TABLET ORAL
Status: CANCELLED | OUTPATIENT
Start: 2021-04-20

## 2021-03-09 RX ORDER — LORAZEPAM 2 MG/ML
0.5 INJECTION INTRAMUSCULAR EVERY 4 HOURS PRN
Status: CANCELLED
Start: 2021-04-20

## 2021-03-09 RX ORDER — EPINEPHRINE 0.3 MG/.3ML
0.3 INJECTION SUBCUTANEOUS EVERY 5 MIN PRN
Status: CANCELLED | OUTPATIENT
Start: 2021-05-11

## 2021-03-09 RX ORDER — LORAZEPAM 2 MG/ML
0.5 INJECTION INTRAMUSCULAR EVERY 4 HOURS PRN
Status: CANCELLED
Start: 2021-05-11

## 2021-03-09 RX ORDER — EPINEPHRINE 1 MG/ML
0.3 INJECTION, SOLUTION INTRAMUSCULAR; SUBCUTANEOUS EVERY 5 MIN PRN
Status: CANCELLED | OUTPATIENT
Start: 2021-05-11

## 2021-03-09 RX ORDER — MEPERIDINE HYDROCHLORIDE 25 MG/ML
25 INJECTION INTRAMUSCULAR; INTRAVENOUS; SUBCUTANEOUS EVERY 30 MIN PRN
Status: CANCELLED | OUTPATIENT
Start: 2021-03-09

## 2021-03-09 RX ORDER — DIPHENHYDRAMINE HYDROCHLORIDE 50 MG/ML
50 INJECTION INTRAMUSCULAR; INTRAVENOUS
Status: CANCELLED
Start: 2021-05-11

## 2021-03-09 RX ORDER — DIPHENHYDRAMINE HCL 25 MG
50 CAPSULE ORAL ONCE
Status: CANCELLED
Start: 2021-03-09

## 2021-03-09 RX ORDER — EPINEPHRINE 0.3 MG/.3ML
0.3 INJECTION SUBCUTANEOUS EVERY 5 MIN PRN
Status: CANCELLED | OUTPATIENT
Start: 2021-04-20

## 2021-03-09 RX ORDER — ACETAMINOPHEN 325 MG/1
650 TABLET ORAL
Status: CANCELLED | OUTPATIENT
Start: 2021-03-30

## 2021-03-09 RX ORDER — HEPARIN SODIUM (PORCINE) LOCK FLUSH IV SOLN 100 UNIT/ML 100 UNIT/ML
5 SOLUTION INTRAVENOUS EVERY 8 HOURS
Status: DISCONTINUED | OUTPATIENT
Start: 2021-03-09 | End: 2021-03-09 | Stop reason: HOSPADM

## 2021-03-09 RX ORDER — ALBUTEROL SULFATE 0.83 MG/ML
2.5 SOLUTION RESPIRATORY (INHALATION)
Status: CANCELLED | OUTPATIENT
Start: 2021-03-30

## 2021-03-09 RX ORDER — METHYLPREDNISOLONE SODIUM SUCCINATE 125 MG/2ML
125 INJECTION, POWDER, LYOPHILIZED, FOR SOLUTION INTRAMUSCULAR; INTRAVENOUS
Status: CANCELLED
Start: 2021-04-20

## 2021-03-09 RX ORDER — ALBUTEROL SULFATE 90 UG/1
1-2 AEROSOL, METERED RESPIRATORY (INHALATION)
Status: CANCELLED
Start: 2021-04-20

## 2021-03-09 RX ORDER — EPINEPHRINE 0.3 MG/.3ML
0.3 INJECTION SUBCUTANEOUS EVERY 5 MIN PRN
Status: CANCELLED | OUTPATIENT
Start: 2021-03-09

## 2021-03-09 RX ORDER — ALBUTEROL SULFATE 90 UG/1
1-2 AEROSOL, METERED RESPIRATORY (INHALATION)
Status: CANCELLED
Start: 2021-05-11

## 2021-03-09 RX ORDER — ALBUTEROL SULFATE 90 UG/1
1-2 AEROSOL, METERED RESPIRATORY (INHALATION)
Status: CANCELLED
Start: 2021-03-09

## 2021-03-09 RX ORDER — DIPHENHYDRAMINE HCL 25 MG
50 CAPSULE ORAL ONCE
Status: CANCELLED
Start: 2021-05-11

## 2021-03-09 RX ORDER — EPINEPHRINE 1 MG/ML
0.3 INJECTION, SOLUTION INTRAMUSCULAR; SUBCUTANEOUS EVERY 5 MIN PRN
Status: CANCELLED | OUTPATIENT
Start: 2021-03-09

## 2021-03-09 RX ORDER — METHYLPREDNISOLONE SODIUM SUCCINATE 125 MG/2ML
125 INJECTION, POWDER, LYOPHILIZED, FOR SOLUTION INTRAMUSCULAR; INTRAVENOUS
Status: CANCELLED
Start: 2021-03-09

## 2021-03-09 RX ORDER — MEPERIDINE HYDROCHLORIDE 25 MG/ML
25 INJECTION INTRAMUSCULAR; INTRAVENOUS; SUBCUTANEOUS EVERY 30 MIN PRN
Status: CANCELLED | OUTPATIENT
Start: 2021-04-20

## 2021-03-09 RX ORDER — LORAZEPAM 2 MG/ML
0.5 INJECTION INTRAMUSCULAR EVERY 4 HOURS PRN
Status: CANCELLED
Start: 2021-03-30

## 2021-03-09 RX ORDER — ACETAMINOPHEN 325 MG/1
650 TABLET ORAL
Status: CANCELLED | OUTPATIENT
Start: 2021-03-09

## 2021-03-09 RX ORDER — ACETAMINOPHEN 325 MG/1
650 TABLET ORAL
Status: CANCELLED | OUTPATIENT
Start: 2021-05-11

## 2021-03-09 RX ADMIN — Medication 5 ML: at 11:11

## 2021-03-09 RX ADMIN — Medication 5 ML: at 07:48

## 2021-03-09 RX ADMIN — TRASTUZUMAB 450 MG: 150 INJECTION, POWDER, LYOPHILIZED, FOR SOLUTION INTRAVENOUS at 10:36

## 2021-03-09 RX ADMIN — SODIUM CHLORIDE 250 ML: 9 INJECTION, SOLUTION INTRAVENOUS at 10:28

## 2021-03-09 ASSESSMENT — PAIN SCALES - GENERAL: PAINLEVEL: NO PAIN (0)

## 2021-03-09 NOTE — PROGRESS NOTES
Reason for Visit: f/u for metastatic breast cancer    Oncology HPI:   A. Initial diagnosis with metastatic breast cancer in [facility].  Neoadjuvant CAF x 6.   B. Left mastectomy. Left axillary node dissection.  C.  Radiation in 1 dose to R iliac region. g Gy.  C. Herceptin for 2 years taxane for a prescribed course then stopped, monthly zoledronic acid.  She had 2 years of Herceptin with tamoxifen added after chemotherapy.  D.  Tamoxifen alone and zoledronic acid every 3 months.  E.  [place]  We restarted Herceptin every 3 weeks and continued tamoxifen. Bone targeted agent changed to denosumab every 6 weeks.   F. Tamoxifen changed to letrozole 10/8/19.      She is from [facility], formerly from [facility], [place]  She lives with her daughter and is here for continuation of every 3 week Herceptin, which she receives along with tamoxifen.  Her tumor is ER positive, KY positive, HER-2 positive.  She had metastatic disease at the time of presentation with bone-only metastases by report.  She presented with metastatic disease in 02/2014.  Staging was initially bone-only metastases by report.  She did her staging in 02/2014 that showed that she had stage IV disease, T4N2M1 invasive ductal carcinoma of the left breast.  She had a right hip metastasis.  She underwent neoadjuvant CAF, left mastectomy, left axillary lymph node dissection and radiation.  She had radiation of the right iliac region where she had metastatic disease.  Pathology report from [facility] shows the tumor to be positive for ER in 75% of the cells, positive for KY in 40% of the cells, and the HER-2 was 3+ positive by immunohistochemistry.  The Ki-67 was 20%.  She had no evidence of nonbone metastatic disease on her PET/CT scan from 08/2017.     Had acute abdominal pain and N/V and found to have cholecystitis in the ER. Had cholecystectomy on 8/31. Herceptin resumed on 9/13/19. Tamoxifen changed to letrozole on 10/8/19. Last restaging  "on 12/14/20 was stable.     Interval history:   -Patient has been feeling well since last visit. Energy level is good  -Appetite has been good. Denies weight loss  -Denies new breast lumps/bumps, pain, redness, or skin changes  -Letrozole going okay at home. Denies hot flashes and night sweats. Denies arthralgias  -Mood is \"excellent\"  -Reports she got just her first dose of COVID vaccine.   -Denies fever, chills, rashes, vision changes, headaches, sore throat, cough, dyspnea, chest pain, peripheral edema, abdominal pain, nausea, vomiting, constipation, diarrhea, hematochezia, hematuria, melena, dysuria, weakness, neuropathy, and dizziness        No Known Allergies    Exam:  /71 (BP Location: Right arm, Patient Position: Sitting, Cuff Size: Adult Large)   Pulse 79   Temp 98.1  F (36.7  C) (Tympanic)   Resp 16   Wt 82.4 kg (181 lb 9.6 oz)   SpO2 94%   BMI 32.18 kg/m      Constitutional: well appearing without acute distress   -Integumentary: color appropriate for ethnicity. Visualized skin is intact without rashes, petechiae, or bruising   -Respiratory: Normal breathing effort. Equal chest rise and fall   -Neurological: Alert and Oriented. Follows commands   -MSK: moves extremities without difficulty   -Psychiatric: patient is cooperative, pleasant and alert. Appropriate affect. Demonstrates understanding of health choices and their consequences     Labs:   Results for NATASHA LOUIS (MRN [id number]) as of 3/9/2021 08:32   Ref. Range 3/9/2021 07:53   Sodium Latest Ref Range: 133 - 144 mmol/L 142   Potassium Latest Ref Range: 3.4 - 5.3 mmol/L 4.0   Chloride Latest Ref Range: 94 - 109 mmol/L 108   Carbon Dioxide Latest Ref Range: 20 - 32 mmol/L 29   Urea Nitrogen Latest Ref Range: 7 - 30 mg/dL 12   Creatinine Latest Ref Range: 0.52 - 1.04 mg/dL 0.78   GFR Estimate Latest Ref Range: >60 mL/min/1.73_m2 80   GFR Estimate If Black Latest Ref Range: >60 mL/min/1.73_m2 >90   Calcium Latest Ref Range: 8.5 " - 10.1 mg/dL 8.8   Anion Gap Latest Ref Range: 3 - 14 mmol/L 5   Albumin Latest Ref Range: 3.4 - 5.0 g/dL 3.3 (L)   Protein Total Latest Ref Range: 6.8 - 8.8 g/dL 7.6   Bilirubin Total Latest Ref Range: 0.2 - 1.3 mg/dL 0.3   Alkaline Phosphatase Latest Ref Range: 40 - 150 U/L 74   ALT Latest Ref Range: 0 - 50 U/L 109 (H)   AST Latest Ref Range: 0 - 45 U/L 69 (H)   Glucose Latest Ref Range: 70 - 99 mg/dL 97   WBC Latest Ref Range: 4.0 - 11.0 10e9/L 6.1   Hemoglobin Latest Ref Range: 11.7 - 15.7 g/dL 12.2   Hematocrit Latest Ref Range: 35.0 - 47.0 % 38.7   Platelet Count Latest Ref Range: 150 - 450 10e9/L 233   RBC Count Latest Ref Range: 3.8 - 5.2 10e12/L 4.19   MCV Latest Ref Range: 78 - 100 fl 92   MCH Latest Ref Range: 26.5 - 33.0 pg 29.1   MCHC Latest Ref Range: 31.5 - 36.5 g/dL 31.5   RDW Latest Ref Range: 10.0 - 15.0 % 13.9   Diff Method Unknown Automated Method   % Neutrophils Latest Units: % 46.1   % Lymphocytes Latest Units: % 40.5   % Monocytes Latest Units: % 7.5   % Eosinophils Latest Units: % 5.2   % Basophils Latest Units: % 0.5   % Immature Granulocytes Latest Units: % 0.2   Nucleated RBCs Latest Ref Range: 0 /100 0   Absolute Neutrophil Latest Ref Range: 1.6 - 8.3 10e9/L 2.8   Absolute Lymphocytes Latest Ref Range: 0.8 - 5.3 10e9/L 2.5   Absolute Monocytes Latest Ref Range: 0.0 - 1.3 10e9/L 0.5   Absolute Eosinophils Latest Ref Range: 0.0 - 0.7 10e9/L 0.3   Absolute Basophils Latest Ref Range: 0.0 - 0.2 10e9/L 0.0   Abs Immature Granulocytes Latest Ref Range: 0 - 0.4 10e9/L 0.0   Absolute Nucleated RBC Unknown 0.0       Assessment/plan:   #Metastatic breast cancer, ER, CA, HER2+ positive  -Was last treated in [facility] with Herceptin. We have continued Herceptin every 3 weeks as well as daily letrozole. CT CAP and echo last month were stable. Tumor markers have been stable.   -OK for Herceptin today.   -Continue letrozole  -Last echo: 02/16/21  -Ct CAP, labs, Herceptin infusion, and follow up with   Stefania in 3 weeks.      #Bone metastasis   -Has previously been on xgeva every 6 weeks. On calcium + vitamin D.    -Discontinued xgeva due to osteonecrosis of maxilla in Oct of 2019. This was biopsy proven. Note from oral surgery was scanned in chart.   -Recent dentistry visits from 2020 describe ongoing bone exposure, thus will discontinue Xgeva permanently for now.       #Transaminitis  -Mild. US showed fatty liver, she is walking twice a day for exercise.     #Health Maintenance  -Received first COVID vaccine. Encouraged her to follow up for second injection        Follow up:   -3/29: CT CAP  -3/30: CBC, CMP, CA27-29, CEA; Dr arreaga visit; and Herceptin    Kayla Liu, APRN, CNP  3/9/2021      22 minutes spent on the date of the encounter doing chart review, review of test results, interpretation of tests, patient visit, documentation and discussion with other provider(s)

## 2021-03-09 NOTE — LETTER
3/9/2021         RE: Hoda Brush  [address]  [place] [address]        Dear Colleague,    Thank you for referring your patient, Hoda Brush, to the [facility]. Please see a copy of my visit note below.    Reason for Visit: f/u for metastatic breast cancer    Oncology HPI:   A. Initial diagnosis with metastatic breast cancer in [facility].  Neoadjuvant CAF x 6.   B. Left mastectomy. Left axillary node dissection.  C.  Radiation in 1 dose to R iliac region. g Gy.  C. Herceptin for 2 years taxane for a prescribed course then stopped, monthly zoledronic acid.  She had 2 years of Herceptin with tamoxifen added after chemotherapy.  D.  Tamoxifen alone and zoledronic acid every 3 months.  E.  [place]  We restarted Herceptin every 3 weeks and continued tamoxifen. Bone targeted agent changed to denosumab every 6 weeks.   F. Tamoxifen changed to letrozole 10/8/19.      She is from [facility], formerly from [facility], and [place]  She lives with her daughter and is here for continuation of every 3 week Herceptin, which she receives along with tamoxifen.  Her tumor is ER positive, AL positive, HER-2 positive.  She had metastatic disease at the time of presentation with bone-only metastases by report.  She presented with metastatic disease in 02/2014.  Staging was initially bone-only metastases by report.  She did her staging in 02/2014 that showed that she had stage IV disease, T4N2M1 invasive ductal carcinoma of the left breast.  She had a right hip metastasis.  She underwent neoadjuvant CAF, left mastectomy, left axillary lymph node dissection and radiation.  She had radiation of the right iliac region where she had metastatic disease.  Pathology report from [facility] shows the tumor to be positive for ER in 75% of the cells, positive for AL in 40% of the cells, and the HER-2 was 3+ positive by immunohistochemistry.  The Ki-67 was 20%.  " She had no evidence of nonbone metastatic disease on her PET/CT scan from 08/2017.     Had acute abdominal pain and N/V and found to have cholecystitis in the ER. Had cholecystectomy on 8/31. Herceptin resumed on 9/13/19. Tamoxifen changed to letrozole on 10/8/19. Last restaging on 12/14/20 was stable.     Interval history:   -Patient has been feeling well since last visit. Energy level is good  -Appetite has been good. Denies weight loss  -Denies new breast lumps/bumps, pain, redness, or skin changes  -Letrozole going okay at home. Denies hot flashes and night sweats. Denies arthralgias  -Mood is \"excellent\"  -Reports she got just her first dose of COVID vaccine.   -Denies fever, chills, rashes, vision changes, headaches, sore throat, cough, dyspnea, chest pain, peripheral edema, abdominal pain, nausea, vomiting, constipation, diarrhea, hematochezia, hematuria, melena, dysuria, weakness, neuropathy, and dizziness        No Known Allergies    Exam:  /71 (BP Location: Right arm, Patient Position: Sitting, Cuff Size: Adult Large)   Pulse 79   Temp 98.1  F (36.7  C) (Tympanic)   Resp 16   Wt 82.4 kg (181 lb 9.6 oz)   SpO2 94%   BMI 32.18 kg/m      Constitutional: well appearing without acute distress   -Integumentary: color appropriate for ethnicity. Visualized skin is intact without rashes, petechiae, or bruising   -Respiratory: Normal breathing effort. Equal chest rise and fall   -Neurological: Alert and Oriented. Follows commands   -MSK: moves extremities without difficulty   -Psychiatric: patient is cooperative, pleasant and alert. Appropriate affect. Demonstrates understanding of health choices and their consequences     Labs:   Results for NATASHA LOUIS (MRN [id number]) as of 3/9/2021 08:32   Ref. Range 3/9/2021 07:53   Sodium Latest Ref Range: 133 - 144 mmol/L 142   Potassium Latest Ref Range: 3.4 - 5.3 mmol/L 4.0   Chloride Latest Ref Range: 94 - 109 mmol/L 108   Carbon Dioxide Latest Ref " Range: 20 - 32 mmol/L 29   Urea Nitrogen Latest Ref Range: 7 - 30 mg/dL 12   Creatinine Latest Ref Range: 0.52 - 1.04 mg/dL 0.78   GFR Estimate Latest Ref Range: >60 mL/min/1.73_m2 80   GFR Estimate If Black Latest Ref Range: >60 mL/min/1.73_m2 >90   Calcium Latest Ref Range: 8.5 - 10.1 mg/dL 8.8   Anion Gap Latest Ref Range: 3 - 14 mmol/L 5   Albumin Latest Ref Range: 3.4 - 5.0 g/dL 3.3 (L)   Protein Total Latest Ref Range: 6.8 - 8.8 g/dL 7.6   Bilirubin Total Latest Ref Range: 0.2 - 1.3 mg/dL 0.3   Alkaline Phosphatase Latest Ref Range: 40 - 150 U/L 74   ALT Latest Ref Range: 0 - 50 U/L 109 (H)   AST Latest Ref Range: 0 - 45 U/L 69 (H)   Glucose Latest Ref Range: 70 - 99 mg/dL 97   WBC Latest Ref Range: 4.0 - 11.0 10e9/L 6.1   Hemoglobin Latest Ref Range: 11.7 - 15.7 g/dL 12.2   Hematocrit Latest Ref Range: 35.0 - 47.0 % 38.7   Platelet Count Latest Ref Range: 150 - 450 10e9/L 233   RBC Count Latest Ref Range: 3.8 - 5.2 10e12/L 4.19   MCV Latest Ref Range: 78 - 100 fl 92   MCH Latest Ref Range: 26.5 - 33.0 pg 29.1   MCHC Latest Ref Range: 31.5 - 36.5 g/dL 31.5   RDW Latest Ref Range: 10.0 - 15.0 % 13.9   Diff Method Unknown Automated Method   % Neutrophils Latest Units: % 46.1   % Lymphocytes Latest Units: % 40.5   % Monocytes Latest Units: % 7.5   % Eosinophils Latest Units: % 5.2   % Basophils Latest Units: % 0.5   % Immature Granulocytes Latest Units: % 0.2   Nucleated RBCs Latest Ref Range: 0 /100 0   Absolute Neutrophil Latest Ref Range: 1.6 - 8.3 10e9/L 2.8   Absolute Lymphocytes Latest Ref Range: 0.8 - 5.3 10e9/L 2.5   Absolute Monocytes Latest Ref Range: 0.0 - 1.3 10e9/L 0.5   Absolute Eosinophils Latest Ref Range: 0.0 - 0.7 10e9/L 0.3   Absolute Basophils Latest Ref Range: 0.0 - 0.2 10e9/L 0.0   Abs Immature Granulocytes Latest Ref Range: 0 - 0.4 10e9/L 0.0   Absolute Nucleated RBC Unknown 0.0       Assessment/plan:   #Metastatic breast cancer, ER, ME, HER2+ positive  -Was last treated in [facility] with  Herceptin. We have continued Herceptin every 3 weeks as well as daily letrozole. CT CAP and echo last month were stable. Tumor markers have been stable.   -OK for Herceptin today.   -Continue letrozole  -Last echo: 02/16/21  -Ct CAP, labs, Herceptin infusion, and follow up with Dr Aguiar in 3 weeks.      #Bone metastasis   -Has previously been on xgeva every 6 weeks. On calcium + vitamin D.    -Discontinued xgeva due to osteonecrosis of maxilla in Oct of 2019. This was biopsy proven. Note from oral surgery was scanned in chart.   -Recent dentistry visits from 2020 describe ongoing bone exposure, thus will discontinue Xgeva permanently for now.       #Transaminitis  -Mild. US showed fatty liver, she is walking twice a day for exercise.     #Health Maintenance  -Received first COVID vaccine. Encouraged her to follow up for second injection        Follow up:   -3/29: CT CAP  -3/30: CBC, CMP, CA27-29, CEA; Dr aguiar visit; and Herceptin    Kayla Liu APRN, CNP  3/9/2021      22 minutes spent on the date of the encounter doing chart review, review of test results, interpretation of tests, patient visit, documentation and discussion with other provider(s)     Hoda is a 66 year old who is being evaluated via a billable video visit.      How would you like to obtain your AVS? MyChart  If the video visit is dropped, the invitation should be resent by: Text to cell phone: [phone]  Will anyone else be joining your video visit? No       JOHAN Le      Video Start Time: 8:09 am  Video-Visit Details    Type of service:  Video Visit    Video End Time:8:19 am    Originating Location (pt. Location): [facility]     Distant Location (provider location):  [facility]     Platform used for Video Visit: SamWell

## 2021-03-09 NOTE — PATIENT INSTRUCTIONS
[facility] Main Line: [phone]    Call triage nurse with chills and/or temperature greater than or equal to 100.4, uncontrolled nausea/vomiting, diarrhea, constipation, dizziness, shortness of breath, chest pain, bleeding, unexplained bruising, or any new/concerning symptoms, questions/concerns.   If you are having any concerning symptoms or wish to speak to a provider before your next infusion visit, please call your care coordinator or triage to notify them so we can adequately serve you.   Nurse Triage line:  [phone]    If after hours, weekends, or holidays, call main hospital  and ask for Oncology doctor on call @ [phone]      March 2021 Sunday Monday Tuesday Wednesday Thursday Friday Saturday        1     2     3     4     5    COVID-19 VACCINE INITIAL   2:05 PM   (15 min.)   [facility] COVID VACCINE INITIAL   [facility] 6       7     8     9    LAB CENTRAL   7:30 AM   (30 min.)   [facility] DRAW   [facility]    VIDEO VISIT RETURN   7:55 AM   (90 min.)   Kayla Liu APRN CNP   [facility] ONC INFUSION 60  10:00 AM   (60 min.)   [facility]   [facility] 10     11     12     13       14     15     16     17     18     19     20       21     22     23     24     25     26     27       28     29    CT CHEST/ABDOMEN/PELVIS W   9:30 AM   (20 min.)   [facility]   [facility] [place] [address]    VIDEO VISIT RETURN   9:15 AM   (30 min.)   Lisandro Aguiar MD   [facility]    LAB CENTRAL  12:30 PM   (15 min.)   [facility] DRAW   [facility] ONC INFUSION 60   1:00 PM   (60 min.)   [facility]   [facility] 31 April 2021 Sunday Monday Tuesday Wednesday Thursday Friday  Saturday                       1     2     3       4     5     6     7     8     9     10       11     12     13     14     15     16     17       18     19     20     21     22     23     24       25     26     27     28     29     30                          Lab Results:  Recent Results (from the past 12 hour(s))   CBC with platelets differential    Collection Time: 03/09/21  7:53 AM   Result Value Ref Range    WBC 6.1 4.0 - 11.0 10e9/L    RBC Count 4.19 3.8 - 5.2 10e12/L    Hemoglobin 12.2 11.7 - 15.7 g/dL    Hematocrit 38.7 35.0 - 47.0 %    MCV 92 78 - 100 fl    MCH 29.1 26.5 - 33.0 pg    MCHC 31.5 31.5 - 36.5 g/dL    RDW 13.9 10.0 - 15.0 %    Platelet Count 233 150 - 450 10e9/L    Diff Method Automated Method     % Neutrophils 46.1 %    % Lymphocytes 40.5 %    % Monocytes 7.5 %    % Eosinophils 5.2 %    % Basophils 0.5 %    % Immature Granulocytes 0.2 %    Nucleated RBCs 0 0 /100    Absolute Neutrophil 2.8 1.6 - 8.3 10e9/L    Absolute Lymphocytes 2.5 0.8 - 5.3 10e9/L    Absolute Monocytes 0.5 0.0 - 1.3 10e9/L    Absolute Eosinophils 0.3 0.0 - 0.7 10e9/L    Absolute Basophils 0.0 0.0 - 0.2 10e9/L    Abs Immature Granulocytes 0.0 0 - 0.4 10e9/L    Absolute Nucleated RBC 0.0    Comprehensive metabolic panel    Collection Time: 03/09/21  7:53 AM   Result Value Ref Range    Sodium 142 133 - 144 mmol/L    Potassium 4.0 3.4 - 5.3 mmol/L    Chloride 108 94 - 109 mmol/L    Carbon Dioxide 29 20 - 32 mmol/L    Anion Gap 5 3 - 14 mmol/L    Glucose 97 70 - 99 mg/dL    Urea Nitrogen 12 7 - 30 mg/dL    Creatinine 0.78 0.52 - 1.04 mg/dL    GFR Estimate 80 >60 mL/min/[1.73_m2]    GFR Estimate If Black >90 >60 mL/min/[1.73_m2]    Calcium 8.8 8.5 - 10.1 mg/dL    Bilirubin Total 0.3 0.2 - 1.3 mg/dL    Albumin 3.3 (L) 3.4 - 5.0 g/dL    Protein Total 7.6 6.8 - 8.8 g/dL    Alkaline Phosphatase 74 40 - 150 U/L     (H) 0 - 50 U/L    AST 69 (H) 0 - 45 U/L

## 2021-03-09 NOTE — PROGRESS NOTES
Hoda is a 66 year old who is being evaluated via a billable video visit.      How would you like to obtain your AVS? MyChart  If the video visit is dropped, the invitation should be resent by: Text to cell phone: [phone]  Will anyone else be joining your video visit? No       JOHAN Le      Video Start Time: 8:09 am  Video-Visit Details    Type of service:  Video Visit    Video End Time:8:19 am    Originating Location (pt. Location): [facility]     Distant Location (provider location):  [facility]     Platform used for Video Visit: SamWell

## 2021-03-09 NOTE — PROGRESS NOTES
Infusion Nursing Note:  Hoda Brush presents today for C60D1 Herceptin.    Patient seen by provider today: Yes: Kayla Liu   present during visit today: Not Applicable.    Note:Patient reported to clinic today after seeing provider with no new complaints or concerns.    Intravenous Access:  Implanted Port.    Treatment Conditions:  Lab Results   Component Value Date    HGB 12.2 03/09/2021     Lab Results   Component Value Date    WBC 6.1 03/09/2021      Lab Results   Component Value Date    ANEU 2.8 03/09/2021     Lab Results   Component Value Date     03/09/2021      Lab Results   Component Value Date     03/09/2021                   Lab Results   Component Value Date    POTASSIUM 4.0 03/09/2021           No results found for: MAG         Lab Results   Component Value Date    CR 0.78 03/09/2021                   Lab Results   Component Value Date    TAYLER 8.8 03/09/2021                Lab Results   Component Value Date    BILITOTAL 0.3 03/09/2021           Lab Results   Component Value Date    ALBUMIN 3.3 03/09/2021                    Lab Results   Component Value Date     03/09/2021           Lab Results   Component Value Date    AST 69 03/09/2021       Results reviewed, labs MET treatment parameters, ok to proceed with treatment.  ECHO/MUGA completed 2/16/21 EF 60-65%.      Post Infusion Assessment:  Patient tolerated infusion without incident.  Blood return noted pre and post infusion.  Site patent and intact, free from redness, edema or discomfort.  No evidence of extravasations.  Access discontinued per protocol.       Discharge Plan:   Patient declined prescription refills.  Discharge instructions reviewed with: Patient.  Patient and/or family verbalized understanding of discharge instructions and all questions answered.  AVS to patient via TapitT.  Patient will return 3/30/21 for next appointment.   Patient discharged in stable condition accompanied by:  self.  Departure Mode: Ambulatory.  Face to Face time: 25 minutes +.    Cash Becerril RN

## 2021-03-29 ENCOUNTER — ANCILLARY PROCEDURE (OUTPATIENT)
Dept: CT IMAGING | Facility: CLINIC | Age: 66
End: 2021-03-29
Attending: PHYSICIAN ASSISTANT
Payer: MEDICAID

## 2021-03-29 DIAGNOSIS — C50.919 METASTATIC BREAST CANCER: ICD-10-CM

## 2021-03-29 PROCEDURE — 71260 CT THORAX DX C+: CPT | Performed by: RADIOLOGY

## 2021-03-29 PROCEDURE — T1013 SIGN LANG/ORAL INTERPRETER: HCPCS | Mod: GT

## 2021-03-29 PROCEDURE — 74177 CT ABD & PELVIS W/CONTRAST: CPT | Performed by: RADIOLOGY

## 2021-03-29 RX ORDER — IOPAMIDOL 755 MG/ML
111 INJECTION, SOLUTION INTRAVASCULAR ONCE
Status: COMPLETED | OUTPATIENT
Start: 2021-03-29 | End: 2021-03-29

## 2021-03-29 RX ORDER — HEPARIN SODIUM (PORCINE) LOCK FLUSH IV SOLN 100 UNIT/ML 100 UNIT/ML
5 SOLUTION INTRAVENOUS ONCE
Status: COMPLETED | OUTPATIENT
Start: 2021-03-29 | End: 2021-03-29

## 2021-03-29 RX ADMIN — HEPARIN SODIUM (PORCINE) LOCK FLUSH IV SOLN 100 UNIT/ML 5 ML: 100 SOLUTION at 09:51

## 2021-03-29 RX ADMIN — IOPAMIDOL 111 ML: 755 INJECTION, SOLUTION INTRAVASCULAR at 09:43

## 2021-03-30 ENCOUNTER — INFUSION THERAPY VISIT (OUTPATIENT)
Dept: ONCOLOGY | Facility: CLINIC | Age: 66
End: 2021-03-30
Attending: INTERNAL MEDICINE
Payer: MEDICAID

## 2021-03-30 ENCOUNTER — APPOINTMENT (OUTPATIENT)
Dept: LAB | Facility: CLINIC | Age: 66
End: 2021-03-30
Attending: INTERNAL MEDICINE
Payer: MEDICAID

## 2021-03-30 VITALS
RESPIRATION RATE: 18 BRPM | OXYGEN SATURATION: 96 % | SYSTOLIC BLOOD PRESSURE: 121 MMHG | WEIGHT: 182.1 LBS | TEMPERATURE: 98.3 F | BODY MASS INDEX: 32.27 KG/M2 | DIASTOLIC BLOOD PRESSURE: 78 MMHG | HEART RATE: 81 BPM

## 2021-03-30 DIAGNOSIS — K21.9 GASTROESOPHAGEAL REFLUX DISEASE WITHOUT ESOPHAGITIS: Primary | ICD-10-CM

## 2021-03-30 DIAGNOSIS — C50.912 MALIGNANT NEOPLASM OF LEFT FEMALE BREAST, UNSPECIFIED ESTROGEN RECEPTOR STATUS, UNSPECIFIED SITE OF BREAST (H): Primary | ICD-10-CM

## 2021-03-30 DIAGNOSIS — C50.912 MALIGNANT NEOPLASM OF LEFT FEMALE BREAST, UNSPECIFIED ESTROGEN RECEPTOR STATUS, UNSPECIFIED SITE OF BREAST (H): ICD-10-CM

## 2021-03-30 DIAGNOSIS — Z51.11 ENCOUNTER FOR ANTINEOPLASTIC CHEMOTHERAPY: ICD-10-CM

## 2021-03-30 DIAGNOSIS — C79.51 BONE METASTASIS: ICD-10-CM

## 2021-03-30 DIAGNOSIS — C79.51 BONE METASTASIS: Primary | ICD-10-CM

## 2021-03-30 LAB
ALBUMIN SERPL-MCNC: 3.2 G/DL (ref 3.4–5)
ALP SERPL-CCNC: 66 U/L (ref 40–150)
ALT SERPL W P-5'-P-CCNC: 84 U/L (ref 0–50)
ANION GAP SERPL CALCULATED.3IONS-SCNC: 3 MMOL/L (ref 3–14)
AST SERPL W P-5'-P-CCNC: 57 U/L (ref 0–45)
BASOPHILS # BLD AUTO: 0 10E9/L (ref 0–0.2)
BASOPHILS NFR BLD AUTO: 0.4 %
BILIRUB SERPL-MCNC: 0.2 MG/DL (ref 0.2–1.3)
BUN SERPL-MCNC: 16 MG/DL (ref 7–30)
CALCIUM SERPL-MCNC: 8.9 MG/DL (ref 8.5–10.1)
CANCER AG27-29 SERPL-ACNC: 8 U/ML (ref 0–39)
CEA SERPL-MCNC: <0.5 UG/L (ref 0–2.5)
CHLORIDE SERPL-SCNC: 111 MMOL/L (ref 94–109)
CO2 SERPL-SCNC: 28 MMOL/L (ref 20–32)
CREAT SERPL-MCNC: 0.78 MG/DL (ref 0.52–1.04)
DIFFERENTIAL METHOD BLD: ABNORMAL
EOSINOPHIL # BLD AUTO: 0.3 10E9/L (ref 0–0.7)
EOSINOPHIL NFR BLD AUTO: 3.4 %
ERYTHROCYTE [DISTWIDTH] IN BLOOD BY AUTOMATED COUNT: 13.8 % (ref 10–15)
GFR SERPL CREATININE-BSD FRML MDRD: 79 ML/MIN/{1.73_M2}
GLUCOSE SERPL-MCNC: 79 MG/DL (ref 70–99)
HCT VFR BLD AUTO: 38.6 % (ref 35–47)
HGB BLD-MCNC: 11.9 G/DL (ref 11.7–15.7)
IMM GRANULOCYTES # BLD: 0 10E9/L (ref 0–0.4)
IMM GRANULOCYTES NFR BLD: 0.2 %
LYMPHOCYTES # BLD AUTO: 3.1 10E9/L (ref 0.8–5.3)
LYMPHOCYTES NFR BLD AUTO: 35.7 %
MCH RBC QN AUTO: 29 PG (ref 26.5–33)
MCHC RBC AUTO-ENTMCNC: 30.8 G/DL (ref 31.5–36.5)
MCV RBC AUTO: 94 FL (ref 78–100)
MONOCYTES # BLD AUTO: 0.6 10E9/L (ref 0–1.3)
MONOCYTES NFR BLD AUTO: 7.2 %
NEUTROPHILS # BLD AUTO: 4.5 10E9/L (ref 1.6–8.3)
NEUTROPHILS NFR BLD AUTO: 53.1 %
NRBC # BLD AUTO: 0 10*3/UL
NRBC BLD AUTO-RTO: 0 /100
PLATELET # BLD AUTO: 232 10E9/L (ref 150–450)
POTASSIUM SERPL-SCNC: 3.8 MMOL/L (ref 3.4–5.3)
PROT SERPL-MCNC: 7.6 G/DL (ref 6.8–8.8)
RBC # BLD AUTO: 4.1 10E12/L (ref 3.8–5.2)
SODIUM SERPL-SCNC: 142 MMOL/L (ref 133–144)
WBC # BLD AUTO: 8.6 10E9/L (ref 4–11)

## 2021-03-30 PROCEDURE — 96413 CHEMO IV INFUSION 1 HR: CPT

## 2021-03-30 PROCEDURE — 86300 IMMUNOASSAY TUMOR CA 15-3: CPT | Performed by: INTERNAL MEDICINE

## 2021-03-30 PROCEDURE — 82378 CARCINOEMBRYONIC ANTIGEN: CPT | Performed by: INTERNAL MEDICINE

## 2021-03-30 PROCEDURE — 99215 OFFICE O/P EST HI 40 MIN: CPT | Mod: 95 | Performed by: INTERNAL MEDICINE

## 2021-03-30 PROCEDURE — 250N000011 HC RX IP 250 OP 636: Performed by: INTERNAL MEDICINE

## 2021-03-30 PROCEDURE — T1013 SIGN LANG/ORAL INTERPRETER: HCPCS | Mod: GT

## 2021-03-30 PROCEDURE — 85025 COMPLETE CBC W/AUTO DIFF WBC: CPT | Performed by: INTERNAL MEDICINE

## 2021-03-30 PROCEDURE — 80053 COMPREHEN METABOLIC PANEL: CPT | Performed by: INTERNAL MEDICINE

## 2021-03-30 PROCEDURE — T1013 SIGN LANG/ORAL INTERPRETER: HCPCS | Mod: GT | Performed by: INTERNAL MEDICINE

## 2021-03-30 PROCEDURE — 258N000003 HC RX IP 258 OP 636: Performed by: INTERNAL MEDICINE

## 2021-03-30 RX ORDER — DIPHENHYDRAMINE HYDROCHLORIDE 50 MG/ML
50 INJECTION INTRAMUSCULAR; INTRAVENOUS
Status: CANCELLED
Start: 2021-06-01

## 2021-03-30 RX ORDER — EPINEPHRINE 0.3 MG/.3ML
0.3 INJECTION SUBCUTANEOUS EVERY 5 MIN PRN
Status: CANCELLED | OUTPATIENT
Start: 2021-06-01

## 2021-03-30 RX ORDER — HEPARIN SODIUM (PORCINE) LOCK FLUSH IV SOLN 100 UNIT/ML 100 UNIT/ML
5 SOLUTION INTRAVENOUS EVERY 8 HOURS
Status: DISCONTINUED | OUTPATIENT
Start: 2021-03-30 | End: 2021-03-30 | Stop reason: HOSPADM

## 2021-03-30 RX ORDER — FAMOTIDINE 20 MG/1
20 TABLET, FILM COATED ORAL 2 TIMES DAILY
Qty: 180 TABLET | Refills: 3 | Status: SHIPPED | OUTPATIENT
Start: 2021-03-30 | End: 2021-06-22

## 2021-03-30 RX ORDER — SODIUM CHLORIDE 9 MG/ML
1000 INJECTION, SOLUTION INTRAVENOUS CONTINUOUS PRN
Status: CANCELLED
Start: 2021-06-01

## 2021-03-30 RX ORDER — ALBUTEROL SULFATE 0.83 MG/ML
2.5 SOLUTION RESPIRATORY (INHALATION)
Status: CANCELLED | OUTPATIENT
Start: 2021-06-01

## 2021-03-30 RX ORDER — LORAZEPAM 2 MG/ML
0.5 INJECTION INTRAMUSCULAR EVERY 4 HOURS PRN
Status: CANCELLED
Start: 2021-06-01

## 2021-03-30 RX ORDER — ALBUTEROL SULFATE 90 UG/1
1-2 AEROSOL, METERED RESPIRATORY (INHALATION)
Status: CANCELLED
Start: 2021-06-01

## 2021-03-30 RX ORDER — METHYLPREDNISOLONE SODIUM SUCCINATE 125 MG/2ML
125 INJECTION, POWDER, LYOPHILIZED, FOR SOLUTION INTRAMUSCULAR; INTRAVENOUS
Status: CANCELLED
Start: 2021-06-01

## 2021-03-30 RX ORDER — MEPERIDINE HYDROCHLORIDE 25 MG/ML
25 INJECTION INTRAMUSCULAR; INTRAVENOUS; SUBCUTANEOUS EVERY 30 MIN PRN
Status: CANCELLED | OUTPATIENT
Start: 2021-06-01

## 2021-03-30 RX ORDER — DIPHENHYDRAMINE HCL 25 MG
50 CAPSULE ORAL ONCE
Status: CANCELLED
Start: 2021-06-01

## 2021-03-30 RX ORDER — HEPARIN SODIUM (PORCINE) LOCK FLUSH IV SOLN 100 UNIT/ML 100 UNIT/ML
500 SOLUTION INTRAVENOUS ONCE
Status: COMPLETED | OUTPATIENT
Start: 2021-03-30 | End: 2021-03-30

## 2021-03-30 RX ORDER — ACETAMINOPHEN 325 MG/1
650 TABLET ORAL
Status: CANCELLED | OUTPATIENT
Start: 2021-06-01

## 2021-03-30 RX ORDER — EPINEPHRINE 1 MG/ML
0.3 INJECTION, SOLUTION INTRAMUSCULAR; SUBCUTANEOUS EVERY 5 MIN PRN
Status: CANCELLED | OUTPATIENT
Start: 2021-06-01

## 2021-03-30 RX ORDER — HEPARIN SODIUM (PORCINE) LOCK FLUSH IV SOLN 100 UNIT/ML 100 UNIT/ML
5 SOLUTION INTRAVENOUS EVERY 8 HOURS
Status: CANCELLED | OUTPATIENT
Start: 2021-06-01

## 2021-03-30 RX ADMIN — TRASTUZUMAB 450 MG: 150 INJECTION, POWDER, LYOPHILIZED, FOR SOLUTION INTRAVENOUS at 14:14

## 2021-03-30 RX ADMIN — SODIUM CHLORIDE 250 ML: 9 INJECTION, SOLUTION INTRAVENOUS at 13:54

## 2021-03-30 RX ADMIN — SODIUM CHLORIDE, PRESERVATIVE FREE 500 UNITS: 5 INJECTION INTRAVENOUS at 12:54

## 2021-03-30 RX ADMIN — Medication 5 ML: at 14:49

## 2021-03-30 ASSESSMENT — PAIN SCALES - GENERAL: PAINLEVEL: NO PAIN (0)

## 2021-03-30 NOTE — NURSING NOTE
Chief Complaint   Patient presents with     Port Draw     labs drawn via port by RN in lab      /78   Pulse 81   Temp 98.3  F (36.8  C) (Tympanic)   Resp 18   Wt 82.6 kg (182 lb 1.6 oz)   SpO2 96%   BMI 32.27 kg/m      Port accessed by RN. Labs drawn and sent. Line flushed with NS and Heparin. Pt tolerated well. Checked in to next appointment.    Hyun Davies RN on 3/30/2021 at 12:54 PM

## 2021-03-30 NOTE — PROGRESS NOTES
Hoda is a 66 year old who is being evaluated via a billable video visit.      How would you like to obtain your AVS? [employer]  If the video visit is dropped, the invitation should be resent by: Text to cell phone: [phone]  Will anyone else be joining your video visit? No      Video Start Time: 9:30  Video-Visit Details    Type of service:  Video Visit    Video End Time:9:52  Originating Location (pt. Location): Home    Distant Location (provider location):  [facility]     Platform used for Video Visit: Nidia Drummond is a patient from [facility] and is seen here for continuation of care for her metastatic ER+HER2- breast cancer.  She is a [employer] refugee from [facility] and was initially seen in clinic with her daughter.  The only data we have from [facility] is an English translation of her records.       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  On 02/11/2014, she was seen by an oncologist.  The clinical staging of T4b N2 M1 was noted.   Her breast cancer was on the left side. There was no right breast cancer, confirmed by the patient and her daughter, correcting a possible error in the translated records.  ER was positive in 100% of the cells, NJ at 14% and HER2 was 3+ positive.  It appears that this histopathologic information is on the mastectomy specimen. She underwent an MRI for staging of presumptive bone metastases which was performed 03/02/2014.  There were skeletal metastases in the thoracic vertebrae at 12, L1, L3, L5 and S1 vertebral body, ranging in size from 0.7-3.0 cm in size.  Ischial and right femur were also involved, as well as a large iliac mass measuring about 15 cm in the uterus. 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   Radiation Oncology consultation was performed 03/11/2014, and she was given radiation in 1 dose of 6 Gy to the right iliac lesion.        With the initial  diagnosis, she initiated treatment with 6 cycles of CAF neoadjuvant chemotherapy 02/14, 07/07, 03/28, 04/18/14, 05/08 and 05/29/14. She also received monthly zoledronic acid.  She then underwent a modified left mastectomy of Palacios type and left lymphadenoectomy on 06/27/2014.   Pathologic examination showed number at [facility] was [id number] showed infiltrative grade 2 ductal cancer with 6 level 1 metastatic lympFollow up with Jossie for visits and trastuzumab on 2-5, 2-26, 3-19 with CBC, CMP.  Echocardiogram on 3-19.  Follow up with me 4-9 with CBC, CMP, CA27.29 and CEA and with CT CAP on 4-8.h nodes and 3 level 2 metastatic lymph nodes.  The differentiation was intermediate.  The tumor was ER positive 70% of the cells, MA positive in 40% of the cells and HER2 was 3+ by immunohistochemistry and the Ki-67 labeling index was 20%. Her staging after surgery was stage IV, pT4b N2 M1.  I don't see a biopsy of the skeletal metastases.  She then had continuation with chemotherapy with 4 cycles of Herceptin and taxane with monthly zoledronic acid.  Tamoxifen was initiated.  She then had two years of Herceptin and a decision was made not to continue further Herceptin.  She continued on zoledronic acid every 3 months. She was clinically stable. [place] then [place] the [place] new [place] from [facility].  She has now been changed to letrozole.  Denosumab has now been held for new diagnosis of osteonecrosis of the R maxilla.     History of cholecystectomy 2020.      TREATMENT HISTORY:  A. Initial diagnosis with metastatic breast cancer in [facility].  Neoadjuvant CAF x 6.   B. Left mastectomy. Left axillary node dissection.  C.  Radiation in 1 dose to R iliac region.  C. Herceptin for 2 years taxane for a prescribed course then stopped, monthly zoledronic acid.  She had 2 years of Herceptin with tamoxifen added after chemotherapy.  D.  Tamoxifen alone and zoledronic acid every 3 months.  E.  [place]  We restarted  Herceptin every 3 weeks and continued tamoxifen. Bone targeted agent changed to denosumab every 9 weeks.  F.  Xgeva discontinued 12-17-19 because of dental issues.   G.  J+J vaccine March, 2021.  H.  Restart Xgeva 3-30-21.      INTERVAL HISTORY  She has been doing generally well.  She has some low back pain which is chronic.  No fatigue, no depression, no diarrhea.  Her dental problems were resolved about 6 months ago and she can restart Xgeva every 3 months.  Protruding bone was removed. No joint stiffness with letrozole.     She had the J+J vaccine.      REVIEW OF SYSTEMS:  She denies fevers or chills, cough, chest pain, shortness of breath, nausea or vomiting, constipation, diarrhea, bone pain, or headache.  She does have a chronic low back pain.  A 10 point review of systems is negative.     PHYSICAL EXAM:    Hoda appeared generally well.  No alopecia.  Mood and affect normal.      Labs  CMP was normal except for grade 1 elevation of ALT, AST.  CBC normal.      Imaging  CT [facility] 3-29-21   EXAM: CT CHEST/ABDOMEN/PELVIS W CONTRAST  LOCATION: [facility]  DATE/TIME: 3/29/2021 9:29 AM     INDICATION: Metastatic breast cancer to bone  COMPARISON: 12/4/2020  TECHNIQUE: CT scan of the chest, abdomen, and pelvis was performed following injection of IV contrast. Multiplanar reformats were obtained. Dose reduction techniques were used.   CONTRAST: Isovue 370 111cc     FINDINGS:   LUNGS AND PLEURA: Few pulmonary nodules are noted within both lungs measuring up to 5 mm which remain stable in size and appearance. No new nodules are identified. No focal consolidation or effusion.     MEDIASTINUM/AXILLAE: Heart is normal in size. No adenopathy.     CORONARY ARTERY CALCIFICATION: None.     HEPATOBILIARY: Liver within normal limits. Stable low-density area along the falciform ligament likely represents focal fat infiltration. Cholecystectomy.     PANCREAS: Normal.     SPLEEN: Normal.     ADRENAL GLANDS:  Normal.     KIDNEYS/BLADDER: Normal.     BOWEL: Normal.     LYMPH NODES: Normal.     VASCULATURE: Unremarkable.     PELVIC ORGANS: Large fibroid within the uterus. Uterus otherwise unremarkable. Bladder within normal limits.     MUSCULOSKELETAL: Diffuse sclerotic metastatic lesions involving the pelvis, spine, sternum, and ribs appear similar to prior.  Degenerative changes of the spine. Postsurgical changes of left mastectomy.                                                                      IMPRESSION:  1.  Overall stable appearance of the chest, abdomen and pelvis and compared with 12/14/2020. Stable diffuse sclerotic metastases. No new metastatic disease.  2.  Stable pulmonary nodules measuring up to 5 mm. No new nodules are identified.          ASSESSMENT AND PLAN:     1.  Hoda Brush is a 66-year-old woman with a history of ER-positive, GA-positive, HER2 positive breast cancer.  She is from [facility], formally from [facility], and came to live in the [place] with [place].  She is here for continuation of every 3-week Herceptin, which she has bee no.  N receiving along with tamoxifen daily.  The tumor is ER positive, GA positive and HER2 positive.  She had metastatic disease at the time of presentation with bone-only metastases by report.  She underwent neoadjuvant CAF, had a left mastectomy, left axillary lymph node dissection, radiation to the right hip, which included the right iliac region where she had metastatic disease.  She was initially on tamoxifen but then was switched to letrozole.  She continues on this and every 3-week trastuzumab.  She has had no evidence of disease progression for the last 3 years.  We have continued Herceptin every 3 weeks as well as daily letrozole. CT CAP yesterday showed stable disease.   2.  Restaging. We reviewed imaging with her and she is pleased that there is no evidence of progression of her metastatic breast cancer. --Continue to tolerate treatment  well. - Tumor markers  have been stable.    3.  Mild transaminase elevations.  Restaging showed no evidence of liver metastases.  These LFT elevations may be due to fatty liver.  4.  Continue the letrozole.   5.  Echo. Stable EF. --Echo in  showed normal EF without change 60 to 65%. 2-16-21.  6.  Discussion of bone health and right maxillary osteonecrosis.  She will continue with calcium and vitamin D.  Restart Xgeva in 3 weeks for osteoporosis because right mandible problem was treated by her dentist and has resolved.   7. Follow up.   Xgeva today. Follow up with Herceptin 4-20 CBC, CMP no provider. Follow up 5-11 with KARISSA phone visit CBC, CMP, CA27.29 and CEA and echocardiogram with Herceptin, Xgeva. Follow up 6-1 with Herceptin, CBC, CMP no provider. Follow up 6-22 with me by video CBC, CMP, CA27.29, CEA and Herceptin, Xgeva.  CT CAP on 6-21.          Thank you for allowing us to participate in this patient's care.      Sincerely,      Lisandro Aguiar MD    [facility]  [phone].       Patient was interviewed and discussed with Dr. Aguiar    ADDENDUM:  4-18-21 Xgeva removed because of insurance issues.  We will consider Zometa once we have a letter from her dentist saying that it is OK.        I spent 15 minutes with the patient more than 50% of which was in counseling and coordination of care.

## 2021-03-30 NOTE — PROGRESS NOTES
Infusion Nursing Note:  Hoda Brush presents today for Cycle 61 Day 1 Herceptin.    Patient seen by provider today: Yes: Dr. Aguiar   present during visit today: Not Applicable.    Note: Hoda presents to infusion today stating she feels well. She denies any symptoms, concerns, or pain today. Patient was supposed to get Xgeva today, but it was not approved by her insurance. Per pharamacy, Xgeva may be approved by next infusion visit- patient aware of this.     Patient declined the covid-19 test.    Intravenous Access:  Implanted Port.    Treatment Conditions:  Lab Results   Component Value Date    HGB 11.9 03/30/2021     Lab Results   Component Value Date    WBC 8.6 03/30/2021      Lab Results   Component Value Date    ANEU 4.5 03/30/2021     Lab Results   Component Value Date     03/30/2021      Lab Results   Component Value Date     03/30/2021                   Lab Results   Component Value Date    POTASSIUM 3.8 03/30/2021           No results found for: MAG         Lab Results   Component Value Date    CR 0.78 03/30/2021                   Lab Results   Component Value Date    TAYLER 8.9 03/30/2021                Lab Results   Component Value Date    BILITOTAL 0.2 03/30/2021           Lab Results   Component Value Date    ALBUMIN 3.2 03/30/2021                    Lab Results   Component Value Date    ALT 84 03/30/2021           Lab Results   Component Value Date    AST 57 03/30/2021       Results reviewed, labs MET treatment parameters, ok to proceed with treatment.  ECHO last on 3/16/21 and EF 60-65%    Post Infusion Assessment:  Patient tolerated infusion without incident.  Blood return noted pre and post infusion.  Access discontinued per protocol.       Discharge Plan:   Patient declined prescription refills.  AVS to patient via [email or web address].  Patient will return 4/20 for next appointment.   Patient discharged in stable condition accompanied by: self.  Departure Mode:  Ambulatory.  Face to Face time: 0.    Rupa Benitez RN

## 2021-03-30 NOTE — LETTER
3/30/2021         RE: Hoda Brush  [address]  [place] [address]        Dear Colleague,    Thank you for referring your patient, Hoda Brush, to the [facility]. Please see a copy of my visit note below.    Hoda is a patient from [facility] and is seen here for continuation of care for her metastatic ER+HER2- breast cancer.  She is a [employer] refugee from [facility] and was initially seen in clinic with her daughter.  The only data we have from [facility] is an English translation of her records.       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  On 02/11/2014, she was seen by an oncologist.  The clinical staging of T4b N2 M1 was noted.   Her breast cancer was on the left side. There was no right breast cancer, confirmed by the patient and her daughter, correcting a possible error in the translated records.  ER was positive in 100% of the cells, CO at 14% and HER2 was 3+ positive.  It appears that this histopathologic information is on the mastectomy specimen. She underwent an MRI for staging of presumptive bone metastases which was performed 03/02/2014.  There were skeletal metastases in the thoracic vertebrae at 12, L1, L3, L5 and S1 vertebral body, ranging in size from 0.7-3.0 cm in size.  Ischial and right femur were also involved, as well as a large iliac mass measuring about 15 cm in the uterus. 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   Radiation Oncology consultation was performed 03/11/2014, and she was given radiation in 1 dose of 6 Gy to the right iliac lesion.        With the initial diagnosis, she initiated treatment with 6 cycles of CAF neoadjuvant chemotherapy 02/14, 07/07, 03/28, 04/18/14, 05/08 and 05/29/14. She also received monthly zoledronic acid.  She then underwent a modified left mastectomy of Palacios type and left lymphadenoectomy on 06/27/2014.    Pathologic examination showed number at [facility] was [id number] showed infiltrative grade 2 ductal cancer with 6 level 1 metastatic lympFollow up with Jossie for visits and trastuzumab on 2-5, 2-26, 3-19 with CBC, CMP.  Echocardiogram on 3-19.  Follow up with me 4-9 with CBC, CMP, CA27.29 and CEA and with CT CAP on 4-8.h nodes and 3 level 2 metastatic lymph nodes.  The differentiation was intermediate.  The tumor was ER positive 70% of the cells, AZ positive in 40% of the cells and HER2 was 3+ by immunohistochemistry and the Ki-67 labeling index was 20%. Her staging after surgery was stage IV, pT4b N2 M1.  I don't see a biopsy of the skeletal metastases.  She then had continuation with chemotherapy with 4 cycles of Herceptin and taxane with monthly zoledronic acid.  Tamoxifen was initiated.  She then had two years of Herceptin and a decision was made not to continue further Herceptin.  She continued on zoledronic acid every 3 months. She was clinically stable. [place] then [place] to the [place] new refugee from [facility].  She has now been changed to letrozole.  Denosumab has now been held for new diagnosis of osteonecrosis of the R maxilla.     History of cholecystectomy 2020.      TREATMENT HISTORY:  A. Initial diagnosis with metastatic breast cancer in [facility].  Neoadjuvant CAF x 6.   B. Left mastectomy. Left axillary node dissection.  C.  Radiation in 1 dose to R iliac region.  C. Herceptin for 2 years taxane for a prescribed course then stopped, monthly zoledronic acid.  She had 2 years of Herceptin with tamoxifen added after chemotherapy.  D.  Tamoxifen alone and zoledronic acid every 3 months.  E.  [place]  We restarted Herceptin every 3 weeks and continued tamoxifen. Bone targeted agent changed to denosumab every 9 weeks.  F.  Xgeva discontinued 12-17-19 because of dental issues.   G.  J+J vaccine March, 2021.  H.  Restart Xgeva 3-30-21.      INTERVAL HISTORY  She has been doing generally well.   She has some low back pain which is chronic.  No fatigue, no depression, no diarrhea.  Her dental problems were resolved about 6 months ago and she can restart Xgeva every 3 months.  Protruding bone was removed. No joint stiffness with letrozole.     She had the J+J vaccine.      REVIEW OF SYSTEMS:  She denies fevers or chills, cough, chest pain, shortness of breath, nausea or vomiting, constipation, diarrhea, bone pain, or headache.  She does have a chronic low back pain.  A 10 point review of systems is negative.     PHYSICAL EXAM:    Hoda appeared generally well.  No alopecia.  Mood and affect normal.      Labs  CMP was normal except for grade 1 elevation of ALT, AST.  CBC normal.      Imaging  CT [facility] 3-29-21   EXAM: CT CHEST/ABDOMEN/PELVIS W CONTRAST  LOCATION: [facility]  DATE/TIME: 3/29/2021 9:29 AM     INDICATION: Metastatic breast cancer to bone  COMPARISON: 12/4/2020  TECHNIQUE: CT scan of the chest, abdomen, and pelvis was performed following injection of IV contrast. Multiplanar reformats were obtained. Dose reduction techniques were used.   CONTRAST: Isovue 370 111cc     FINDINGS:   LUNGS AND PLEURA: Few pulmonary nodules are noted within both lungs measuring up to 5 mm which remain stable in size and appearance. No new nodules are identified. No focal consolidation or effusion.     MEDIASTINUM/AXILLAE: Heart is normal in size. No adenopathy.     CORONARY ARTERY CALCIFICATION: None.     HEPATOBILIARY: Liver within normal limits. Stable low-density area along the falciform ligament likely represents focal fat infiltration. Cholecystectomy.     PANCREAS: Normal.     SPLEEN: Normal.     ADRENAL GLANDS: Normal.     KIDNEYS/BLADDER: Normal.     BOWEL: Normal.     LYMPH NODES: Normal.     VASCULATURE: Unremarkable.     PELVIC ORGANS: Large fibroid within the uterus. Uterus otherwise unremarkable. Bladder within normal limits.     MUSCULOSKELETAL: Diffuse sclerotic metastatic lesions involving  the pelvis, spine, sternum, and ribs appear similar to prior.  Degenerative changes of the spine. Postsurgical changes of left mastectomy.                                                                      IMPRESSION:  1.  Overall stable appearance of the chest, abdomen and pelvis and compared with 12/14/2020. Stable diffuse sclerotic metastases. No new metastatic disease.  2.  Stable pulmonary nodules measuring up to 5 mm. No new nodules are identified.          ASSESSMENT AND PLAN:     1.  Hoda Brush is a 66-year-old woman with a history of ER-positive, RI-positive, HER2 positive breast cancer.  She is from [facility], formally from [facility], and came to live in the [place] with [place].  She is here for continuation of every 3-week Herceptin, which she has bee no.  N receiving along with tamoxifen daily.  The tumor is ER positive, RI positive and HER2 positive.  She had metastatic disease at the time of presentation with bone-only metastases by report.  She underwent neoadjuvant CAF, had a left mastectomy, left axillary lymph node dissection, radiation to the right hip, which included the right iliac region where she had metastatic disease.  She was initially on tamoxifen but then was switched to letrozole.  She continues on this and every 3-week trastuzumab.  She has had no evidence of disease progression for the last 3 years.  We have continued Herceptin every 3 weeks as well as daily letrozole. CT CAP yesterday showed stable disease.   2.  Restaging. We reviewed imaging with her and she is pleased that there is no evidence of progression of her metastatic breast cancer. --Continue to tolerate treatment well. - Tumor markers  have been stable.    3.  Mild transaminase elevations.  Restaging showed no evidence of liver metastases.  These LFT elevations may be due to fatty liver.  4.  Continue the letrozole.   5.  Echo. Stable EF. --Echo in  showed normal EF without change 60 to 65%. 2-16-21.  6.   Discussion of bone health and right maxillary osteonecrosis.  She will continue with calcium and vitamin D.  Restart Xgeva in 3 weeks for osteoporosis because right mandible problem was treated by her dentist and has resolved.   7. Follow up.   Xgeva today. Follow up with Herceptin 4-20 CBC, CMP no provider. Follow up 5-11 with KARISSA phone visit CBC, CMP, CA27.29 and CEA and echocardiogram with Herceptin, Xgeva. Follow up 6-1 with Herceptin, CBC, CMP no provider. Follow up 6-22 with me by video CBC, CMP, CA27.29, CEA and Herceptin, Xgeva.  CT CAP on 6-21.          Thank you for allowing us to participate in this patient's care.      Sincerely,      Lisandro Aguiar MD    [facility]  [phone].       Patient was interviewed and discussed with Dr. Aguiar    ADDENDUM:  4-18-21 Xgeva removed because of insurance issues.  We will consider Zometa once we have a letter from her dentist saying that it is OK.        I spent 15 minutes with the patient more than 50% of which was in counseling and coordination of care.       Again, thank you for allowing me to participate in the care of your patient.      Sincerely,    Lisandro Aguiar MD

## 2021-04-19 ENCOUNTER — PATIENT OUTREACH (OUTPATIENT)
Dept: ONCOLOGY | Facility: CLINIC | Age: 66
End: 2021-04-19

## 2021-04-19 NOTE — PROGRESS NOTES
Spoke to patient with [place]  ID [id number] with Dr Aguiar's recommendations to obtain a letter from her dentist prior to next XGEVA infusion with issues with her teeth and gum infection in the past. Patient stated she had information from the dentist four months ago. Clarified when last dentist visit and only had dental cleaning. Patient stated she had records from four months ago. Instructed patient needs more recent dental visit to verify her teeth and gums are healthy and no infections. Patient said she went to the dentist a week ago. Requested patient to have dentist to write a letter and fax to Dr Aguiar. Will send a [employer] message with letter requirements and where dentist will fax the letter to Dr Aguiar.  Answered all patient's questions and verbalized understanding. Cortney Ramos RN, BSN.

## 2021-04-20 ENCOUNTER — INFUSION THERAPY VISIT (OUTPATIENT)
Dept: ONCOLOGY | Facility: CLINIC | Age: 66
End: 2021-04-20
Attending: INTERNAL MEDICINE
Payer: MEDICAID

## 2021-04-20 VITALS
HEART RATE: 76 BPM | DIASTOLIC BLOOD PRESSURE: 73 MMHG | SYSTOLIC BLOOD PRESSURE: 117 MMHG | RESPIRATION RATE: 16 BRPM | WEIGHT: 181.7 LBS | TEMPERATURE: 98 F | BODY MASS INDEX: 32.2 KG/M2

## 2021-04-20 DIAGNOSIS — C50.912 MALIGNANT NEOPLASM OF LEFT FEMALE BREAST, UNSPECIFIED ESTROGEN RECEPTOR STATUS, UNSPECIFIED SITE OF BREAST (H): Primary | ICD-10-CM

## 2021-04-20 LAB
ALBUMIN SERPL-MCNC: 3.2 G/DL (ref 3.4–5)
ALP SERPL-CCNC: 66 U/L (ref 40–150)
ALT SERPL W P-5'-P-CCNC: 71 U/L (ref 0–50)
ANION GAP SERPL CALCULATED.3IONS-SCNC: 3 MMOL/L (ref 3–14)
AST SERPL W P-5'-P-CCNC: 38 U/L (ref 0–45)
BASOPHILS # BLD AUTO: 0 10E9/L (ref 0–0.2)
BASOPHILS NFR BLD AUTO: 0.4 %
BILIRUB SERPL-MCNC: 0.3 MG/DL (ref 0.2–1.3)
BUN SERPL-MCNC: 12 MG/DL (ref 7–30)
CALCIUM SERPL-MCNC: 9 MG/DL (ref 8.5–10.1)
CANCER AG27-29 SERPL-ACNC: 6 U/ML (ref 0–39)
CEA SERPL-MCNC: <0.5 UG/L (ref 0–2.5)
CHLORIDE SERPL-SCNC: 109 MMOL/L (ref 94–109)
CO2 SERPL-SCNC: 29 MMOL/L (ref 20–32)
CREAT SERPL-MCNC: 0.86 MG/DL (ref 0.52–1.04)
DIFFERENTIAL METHOD BLD: ABNORMAL
EOSINOPHIL # BLD AUTO: 0.3 10E9/L (ref 0–0.7)
EOSINOPHIL NFR BLD AUTO: 4.8 %
ERYTHROCYTE [DISTWIDTH] IN BLOOD BY AUTOMATED COUNT: 13.8 % (ref 10–15)
GFR SERPL CREATININE-BSD FRML MDRD: 70 ML/MIN/{1.73_M2}
GLUCOSE SERPL-MCNC: 82 MG/DL (ref 70–99)
HCT VFR BLD AUTO: 39.1 % (ref 35–47)
HGB BLD-MCNC: 12.1 G/DL (ref 11.7–15.7)
IMM GRANULOCYTES # BLD: 0 10E9/L (ref 0–0.4)
IMM GRANULOCYTES NFR BLD: 0.1 %
LYMPHOCYTES # BLD AUTO: 2.3 10E9/L (ref 0.8–5.3)
LYMPHOCYTES NFR BLD AUTO: 33.3 %
MCH RBC QN AUTO: 28.6 PG (ref 26.5–33)
MCHC RBC AUTO-ENTMCNC: 30.9 G/DL (ref 31.5–36.5)
MCV RBC AUTO: 92 FL (ref 78–100)
MONOCYTES # BLD AUTO: 0.5 10E9/L (ref 0–1.3)
MONOCYTES NFR BLD AUTO: 6.7 %
NEUTROPHILS # BLD AUTO: 3.8 10E9/L (ref 1.6–8.3)
NEUTROPHILS NFR BLD AUTO: 54.7 %
NRBC # BLD AUTO: 0 10*3/UL
NRBC BLD AUTO-RTO: 0 /100
PLATELET # BLD AUTO: 221 10E9/L (ref 150–450)
POTASSIUM SERPL-SCNC: 4 MMOL/L (ref 3.4–5.3)
PROT SERPL-MCNC: 7.6 G/DL (ref 6.8–8.8)
RBC # BLD AUTO: 4.23 10E12/L (ref 3.8–5.2)
SODIUM SERPL-SCNC: 141 MMOL/L (ref 133–144)
WBC # BLD AUTO: 6.9 10E9/L (ref 4–11)

## 2021-04-20 PROCEDURE — 85025 COMPLETE CBC W/AUTO DIFF WBC: CPT | Performed by: INTERNAL MEDICINE

## 2021-04-20 PROCEDURE — 250N000011 HC RX IP 250 OP 636: Performed by: INTERNAL MEDICINE

## 2021-04-20 PROCEDURE — T1013 SIGN LANG/ORAL INTERPRETER: HCPCS | Mod: GT

## 2021-04-20 PROCEDURE — 82378 CARCINOEMBRYONIC ANTIGEN: CPT | Performed by: INTERNAL MEDICINE

## 2021-04-20 PROCEDURE — 80053 COMPREHEN METABOLIC PANEL: CPT | Performed by: INTERNAL MEDICINE

## 2021-04-20 PROCEDURE — 258N000003 HC RX IP 258 OP 636: Performed by: INTERNAL MEDICINE

## 2021-04-20 PROCEDURE — 86300 IMMUNOASSAY TUMOR CA 15-3: CPT | Performed by: INTERNAL MEDICINE

## 2021-04-20 PROCEDURE — 96413 CHEMO IV INFUSION 1 HR: CPT

## 2021-04-20 RX ORDER — HEPARIN SODIUM (PORCINE) LOCK FLUSH IV SOLN 100 UNIT/ML 100 UNIT/ML
5 SOLUTION INTRAVENOUS EVERY 8 HOURS
Status: DISCONTINUED | OUTPATIENT
Start: 2021-04-20 | End: 2021-04-20 | Stop reason: HOSPADM

## 2021-04-20 RX ORDER — HEPARIN SODIUM (PORCINE) LOCK FLUSH IV SOLN 100 UNIT/ML 100 UNIT/ML
5 SOLUTION INTRAVENOUS ONCE
Status: COMPLETED | OUTPATIENT
Start: 2021-04-20 | End: 2021-04-20

## 2021-04-20 RX ADMIN — Medication 5 ML: at 10:49

## 2021-04-20 RX ADMIN — SODIUM CHLORIDE 250 ML: 9 INJECTION, SOLUTION INTRAVENOUS at 10:16

## 2021-04-20 RX ADMIN — TRASTUZUMAB 450 MG: 150 INJECTION, POWDER, LYOPHILIZED, FOR SOLUTION INTRAVENOUS at 10:16

## 2021-04-20 RX ADMIN — Medication 5 ML: at 09:12

## 2021-04-20 ASSESSMENT — PAIN SCALES - GENERAL: PAINLEVEL: NO PAIN (0)

## 2021-04-20 NOTE — NURSING NOTE
Chief Complaint   Patient presents with     Port Draw     Labs drawn via port by rn in lab. VS taken.     Port accessed with 20g 3/4 inch gripper needle by RN, labs collected, line flushed with saline and heparin.  Vitals taken. Pt checked in for appointment(s).    Codey Cochran, RN

## 2021-04-20 NOTE — PROGRESS NOTES
Infusion Nursing Note:  Hoda Brush presents today for Cycle 62 Herceptin .    Patient seen by provider today: No   present during visit today: Not Applicable.    Note: Patient declined need for  today, able to answer questions without. Pt has no concerns to report today.    Intravenous Access:  Implanted Port.    Treatment Conditions:  Lab Results   Component Value Date    HGB 12.1 04/20/2021     Lab Results   Component Value Date    WBC 6.9 04/20/2021      Lab Results   Component Value Date    ANEU 3.8 04/20/2021     Lab Results   Component Value Date     04/20/2021      Lab Results   Component Value Date     04/20/2021                   Lab Results   Component Value Date    POTASSIUM 4.0 04/20/2021           No results found for: MAG         Lab Results   Component Value Date    CR 0.86 04/20/2021                   Lab Results   Component Value Date    TAYLER 9.0 04/20/2021                Lab Results   Component Value Date    BILITOTAL 0.3 04/20/2021           Lab Results   Component Value Date    ALBUMIN 3.2 04/20/2021                    Lab Results   Component Value Date    ALT 71 04/20/2021           Lab Results   Component Value Date    AST 38 04/20/2021       Results reviewed, labs MET treatment parameters, ok to proceed with treatment.  ECHO/MUGA completed 2/16/2021  EF 60-65%.      Post Infusion Assessment:  Patient tolerated infusion without incident.  Blood return noted pre and post infusion.  Access discontinued per protocol.       Discharge Plan:   Patient declined prescription refills.  AVS to patient via [employer].  Patient will return 5/11 for next appointment.   Patient discharged in stable condition accompanied by: self.  Departure Mode: Ambulatory.    Caren Wheeler RN

## 2021-04-27 ENCOUNTER — MEDICAL CORRESPONDENCE (OUTPATIENT)
Dept: HEALTH INFORMATION MANAGEMENT | Facility: CLINIC | Age: 66
End: 2021-04-27

## 2021-05-11 ENCOUNTER — INFUSION THERAPY VISIT (OUTPATIENT)
Dept: ONCOLOGY | Facility: CLINIC | Age: 66
End: 2021-05-11
Attending: INTERNAL MEDICINE
Payer: MEDICAID

## 2021-05-11 VITALS
BODY MASS INDEX: 32.23 KG/M2 | SYSTOLIC BLOOD PRESSURE: 122 MMHG | WEIGHT: 181.9 LBS | OXYGEN SATURATION: 95 % | RESPIRATION RATE: 16 BRPM | DIASTOLIC BLOOD PRESSURE: 79 MMHG | HEART RATE: 80 BPM | TEMPERATURE: 97.7 F

## 2021-05-11 DIAGNOSIS — C79.51 BONE METASTASIS: Primary | ICD-10-CM

## 2021-05-11 DIAGNOSIS — C50.912 MALIGNANT NEOPLASM OF LEFT FEMALE BREAST, UNSPECIFIED ESTROGEN RECEPTOR STATUS, UNSPECIFIED SITE OF BREAST (H): Primary | ICD-10-CM

## 2021-05-11 DIAGNOSIS — C79.51 BONE METASTASIS: ICD-10-CM

## 2021-05-11 LAB
ALBUMIN SERPL-MCNC: 3.3 G/DL (ref 3.4–5)
ALP SERPL-CCNC: 76 U/L (ref 40–150)
ALT SERPL W P-5'-P-CCNC: 77 U/L (ref 0–50)
ANION GAP SERPL CALCULATED.3IONS-SCNC: 4 MMOL/L (ref 3–14)
AST SERPL W P-5'-P-CCNC: 37 U/L (ref 0–45)
BASOPHILS # BLD AUTO: 0 10E9/L (ref 0–0.2)
BASOPHILS NFR BLD AUTO: 0.5 %
BILIRUB SERPL-MCNC: 0.3 MG/DL (ref 0.2–1.3)
BUN SERPL-MCNC: 16 MG/DL (ref 7–30)
CALCIUM SERPL-MCNC: 9.1 MG/DL (ref 8.5–10.1)
CANCER AG27-29 SERPL-ACNC: 7 U/ML (ref 0–39)
CEA SERPL-MCNC: 0.5 UG/L (ref 0–2.5)
CHLORIDE SERPL-SCNC: 109 MMOL/L (ref 94–109)
CO2 SERPL-SCNC: 30 MMOL/L (ref 20–32)
CREAT SERPL-MCNC: 0.8 MG/DL (ref 0.52–1.04)
DIFFERENTIAL METHOD BLD: ABNORMAL
EOSINOPHIL # BLD AUTO: 0.2 10E9/L (ref 0–0.7)
EOSINOPHIL NFR BLD AUTO: 3.7 %
ERYTHROCYTE [DISTWIDTH] IN BLOOD BY AUTOMATED COUNT: 14.2 % (ref 10–15)
GFR SERPL CREATININE-BSD FRML MDRD: 78 ML/MIN/{1.73_M2}
GLUCOSE SERPL-MCNC: 126 MG/DL (ref 70–99)
HCT VFR BLD AUTO: 38.3 % (ref 35–47)
HGB BLD-MCNC: 12 G/DL (ref 11.7–15.7)
IMM GRANULOCYTES # BLD: 0 10E9/L (ref 0–0.4)
IMM GRANULOCYTES NFR BLD: 0.2 %
LYMPHOCYTES # BLD AUTO: 2.2 10E9/L (ref 0.8–5.3)
LYMPHOCYTES NFR BLD AUTO: 34.2 %
MCH RBC QN AUTO: 29.2 PG (ref 26.5–33)
MCHC RBC AUTO-ENTMCNC: 31.3 G/DL (ref 31.5–36.5)
MCV RBC AUTO: 93 FL (ref 78–100)
MONOCYTES # BLD AUTO: 0.4 10E9/L (ref 0–1.3)
MONOCYTES NFR BLD AUTO: 5.9 %
NEUTROPHILS # BLD AUTO: 3.6 10E9/L (ref 1.6–8.3)
NEUTROPHILS NFR BLD AUTO: 55.5 %
NRBC # BLD AUTO: 0 10*3/UL
NRBC BLD AUTO-RTO: 0 /100
PLATELET # BLD AUTO: 228 10E9/L (ref 150–450)
POTASSIUM SERPL-SCNC: 3.8 MMOL/L (ref 3.4–5.3)
PROT SERPL-MCNC: 7.5 G/DL (ref 6.8–8.8)
RBC # BLD AUTO: 4.11 10E12/L (ref 3.8–5.2)
SODIUM SERPL-SCNC: 142 MMOL/L (ref 133–144)
WBC # BLD AUTO: 6.5 10E9/L (ref 4–11)

## 2021-05-11 PROCEDURE — 82378 CARCINOEMBRYONIC ANTIGEN: CPT | Performed by: INTERNAL MEDICINE

## 2021-05-11 PROCEDURE — T1013 SIGN LANG/ORAL INTERPRETER: HCPCS | Mod: GT

## 2021-05-11 PROCEDURE — T1013 SIGN LANG/ORAL INTERPRETER: HCPCS | Mod: GT | Performed by: PHYSICIAN ASSISTANT

## 2021-05-11 PROCEDURE — 250N000011 HC RX IP 250 OP 636: Mod: GT | Performed by: INTERNAL MEDICINE

## 2021-05-11 PROCEDURE — 258N000003 HC RX IP 258 OP 636: Performed by: INTERNAL MEDICINE

## 2021-05-11 PROCEDURE — 250N000011 HC RX IP 250 OP 636: Performed by: INTERNAL MEDICINE

## 2021-05-11 PROCEDURE — 86300 IMMUNOASSAY TUMOR CA 15-3: CPT | Performed by: INTERNAL MEDICINE

## 2021-05-11 PROCEDURE — 99214 OFFICE O/P EST MOD 30 MIN: CPT | Mod: 95 | Performed by: PHYSICIAN ASSISTANT

## 2021-05-11 PROCEDURE — 250N000011 HC RX IP 250 OP 636: Performed by: PHYSICIAN ASSISTANT

## 2021-05-11 PROCEDURE — 96413 CHEMO IV INFUSION 1 HR: CPT

## 2021-05-11 PROCEDURE — 96367 TX/PROPH/DG ADDL SEQ IV INF: CPT

## 2021-05-11 PROCEDURE — 80053 COMPREHEN METABOLIC PANEL: CPT | Performed by: INTERNAL MEDICINE

## 2021-05-11 PROCEDURE — 85025 COMPLETE CBC W/AUTO DIFF WBC: CPT | Performed by: INTERNAL MEDICINE

## 2021-05-11 RX ORDER — ALBUTEROL SULFATE 90 UG/1
1-2 AEROSOL, METERED RESPIRATORY (INHALATION)
Status: CANCELLED
Start: 2021-07-22

## 2021-05-11 RX ORDER — METHYLPREDNISOLONE SODIUM SUCCINATE 125 MG/2ML
125 INJECTION, POWDER, LYOPHILIZED, FOR SOLUTION INTRAMUSCULAR; INTRAVENOUS
Status: CANCELLED
Start: 2021-06-22

## 2021-05-11 RX ORDER — SODIUM CHLORIDE 9 MG/ML
1000 INJECTION, SOLUTION INTRAVENOUS CONTINUOUS PRN
Status: CANCELLED
Start: 2021-07-22

## 2021-05-11 RX ORDER — EPINEPHRINE 1 MG/ML
0.3 INJECTION, SOLUTION INTRAMUSCULAR; SUBCUTANEOUS EVERY 5 MIN PRN
Status: CANCELLED | OUTPATIENT
Start: 2021-07-22

## 2021-05-11 RX ORDER — ZOLEDRONIC ACID 0.04 MG/ML
4 INJECTION, SOLUTION INTRAVENOUS ONCE
Status: CANCELLED | OUTPATIENT
Start: 2021-05-11 | End: 2021-05-11

## 2021-05-11 RX ORDER — HEPARIN SODIUM (PORCINE) LOCK FLUSH IV SOLN 100 UNIT/ML 100 UNIT/ML
5 SOLUTION INTRAVENOUS EVERY 8 HOURS
Status: DISCONTINUED | OUTPATIENT
Start: 2021-05-11 | End: 2021-05-11 | Stop reason: HOSPADM

## 2021-05-11 RX ORDER — DIPHENHYDRAMINE HCL 25 MG
50 CAPSULE ORAL ONCE
Status: CANCELLED
Start: 2021-06-22

## 2021-05-11 RX ORDER — EPINEPHRINE 0.3 MG/.3ML
0.3 INJECTION SUBCUTANEOUS EVERY 5 MIN PRN
Status: CANCELLED | OUTPATIENT
Start: 2021-07-22

## 2021-05-11 RX ORDER — ALBUTEROL SULFATE 0.83 MG/ML
2.5 SOLUTION RESPIRATORY (INHALATION)
Status: CANCELLED | OUTPATIENT
Start: 2021-06-22

## 2021-05-11 RX ORDER — DIPHENHYDRAMINE HYDROCHLORIDE 50 MG/ML
50 INJECTION INTRAMUSCULAR; INTRAVENOUS
Status: CANCELLED
Start: 2021-06-22

## 2021-05-11 RX ORDER — HEPARIN SODIUM (PORCINE) LOCK FLUSH IV SOLN 100 UNIT/ML 100 UNIT/ML
5 SOLUTION INTRAVENOUS ONCE
Status: COMPLETED | OUTPATIENT
Start: 2021-05-11 | End: 2021-05-11

## 2021-05-11 RX ORDER — ACETAMINOPHEN 325 MG/1
650 TABLET ORAL
Status: CANCELLED | OUTPATIENT
Start: 2021-06-22

## 2021-05-11 RX ORDER — LORAZEPAM 2 MG/ML
0.5 INJECTION INTRAMUSCULAR EVERY 4 HOURS PRN
Status: CANCELLED
Start: 2021-07-22

## 2021-05-11 RX ORDER — LORAZEPAM 2 MG/ML
0.5 INJECTION INTRAMUSCULAR EVERY 4 HOURS PRN
Status: CANCELLED
Start: 2021-06-22

## 2021-05-11 RX ORDER — SODIUM CHLORIDE 9 MG/ML
1000 INJECTION, SOLUTION INTRAVENOUS CONTINUOUS PRN
Status: CANCELLED
Start: 2021-06-22

## 2021-05-11 RX ORDER — ACETAMINOPHEN 325 MG/1
650 TABLET ORAL
Status: CANCELLED | OUTPATIENT
Start: 2021-07-22

## 2021-05-11 RX ORDER — MEPERIDINE HYDROCHLORIDE 25 MG/ML
25 INJECTION INTRAMUSCULAR; INTRAVENOUS; SUBCUTANEOUS EVERY 30 MIN PRN
Status: CANCELLED | OUTPATIENT
Start: 2021-06-22

## 2021-05-11 RX ORDER — DIPHENHYDRAMINE HCL 25 MG
50 CAPSULE ORAL ONCE
Status: CANCELLED
Start: 2021-07-22

## 2021-05-11 RX ORDER — MEPERIDINE HYDROCHLORIDE 25 MG/ML
25 INJECTION INTRAMUSCULAR; INTRAVENOUS; SUBCUTANEOUS EVERY 30 MIN PRN
Status: CANCELLED | OUTPATIENT
Start: 2021-07-22

## 2021-05-11 RX ORDER — EPINEPHRINE 1 MG/ML
0.3 INJECTION, SOLUTION INTRAMUSCULAR; SUBCUTANEOUS EVERY 5 MIN PRN
Status: CANCELLED | OUTPATIENT
Start: 2021-06-22

## 2021-05-11 RX ORDER — METHYLPREDNISOLONE SODIUM SUCCINATE 125 MG/2ML
125 INJECTION, POWDER, LYOPHILIZED, FOR SOLUTION INTRAMUSCULAR; INTRAVENOUS
Status: CANCELLED
Start: 2021-07-22

## 2021-05-11 RX ORDER — ALBUTEROL SULFATE 90 UG/1
1-2 AEROSOL, METERED RESPIRATORY (INHALATION)
Status: CANCELLED
Start: 2021-06-22

## 2021-05-11 RX ORDER — HEPARIN SODIUM (PORCINE) LOCK FLUSH IV SOLN 100 UNIT/ML 100 UNIT/ML
5 SOLUTION INTRAVENOUS
Status: CANCELLED | OUTPATIENT
Start: 2021-05-11

## 2021-05-11 RX ORDER — ZOLEDRONIC ACID 0.04 MG/ML
4 INJECTION, SOLUTION INTRAVENOUS ONCE
Status: COMPLETED | OUTPATIENT
Start: 2021-05-11 | End: 2021-05-11

## 2021-05-11 RX ORDER — ALBUTEROL SULFATE 0.83 MG/ML
2.5 SOLUTION RESPIRATORY (INHALATION)
Status: CANCELLED | OUTPATIENT
Start: 2021-07-22

## 2021-05-11 RX ORDER — HEPARIN SODIUM,PORCINE 10 UNIT/ML
5 VIAL (ML) INTRAVENOUS
Status: CANCELLED | OUTPATIENT
Start: 2021-05-11

## 2021-05-11 RX ORDER — DIPHENHYDRAMINE HYDROCHLORIDE 50 MG/ML
50 INJECTION INTRAMUSCULAR; INTRAVENOUS
Status: CANCELLED
Start: 2021-07-22

## 2021-05-11 RX ORDER — HEPARIN SODIUM (PORCINE) LOCK FLUSH IV SOLN 100 UNIT/ML 100 UNIT/ML
5 SOLUTION INTRAVENOUS EVERY 8 HOURS
Status: CANCELLED | OUTPATIENT
Start: 2021-07-22

## 2021-05-11 RX ORDER — EPINEPHRINE 0.3 MG/.3ML
0.3 INJECTION SUBCUTANEOUS EVERY 5 MIN PRN
Status: CANCELLED | OUTPATIENT
Start: 2021-06-22

## 2021-05-11 RX ORDER — HEPARIN SODIUM (PORCINE) LOCK FLUSH IV SOLN 100 UNIT/ML 100 UNIT/ML
5 SOLUTION INTRAVENOUS EVERY 8 HOURS
Status: CANCELLED | OUTPATIENT
Start: 2021-06-22

## 2021-05-11 RX ADMIN — Medication 5 ML: at 11:06

## 2021-05-11 RX ADMIN — TRASTUZUMAB 450 MG: 150 INJECTION, POWDER, LYOPHILIZED, FOR SOLUTION INTRAVENOUS at 10:09

## 2021-05-11 RX ADMIN — SODIUM CHLORIDE 250 ML: 9 INJECTION, SOLUTION INTRAVENOUS at 09:49

## 2021-05-11 RX ADMIN — Medication 5 ML: at 08:04

## 2021-05-11 RX ADMIN — ZOLEDRONIC ACID 4 MG: 0.04 INJECTION, SOLUTION INTRAVENOUS at 10:45

## 2021-05-11 ASSESSMENT — PAIN SCALES - GENERAL: PAINLEVEL: NO PAIN (0)

## 2021-05-11 NOTE — PROGRESS NOTES
"Hoda is a 66 year old who is being evaluated via a billable video visit.      How would you like to obtain your AVS? MyChart  If the video visit is dropped, the invitation should be resent by: Text to cell phone: [phone]  Will anyone else be joining your video visit? No   ,Vitals - Patient Reported  Weight (Patient Reported): 82.1 kg (181 lb)  Height (Patient Reported): 157.5 cm (5' 2\")  BMI (Based on Pt Reported Ht/Wt): 33.1  Pain Score: No Pain (0)    Ashlie Farris CMA on 5/11/2021 at 8:23 AM    May 11, 2021    REASON FOR VISIT: 6 week mBC follow up    Hoda is a patient from [facility] and is seen here for continuation of care for her metastatic ER+HER2- breast cancer.  She requires a [place] .       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  On 02/11/2014, she was seen by an oncologist.  The clinical staging of T4b N2 M1 was noted.   Her breast cancer was on the left side. There was no right breast cancer, confirmed by the patient and her daughter, correcting a possible error in the translated records.  ER was positive in 100% of the cells, AL at 14% and HER2 was 3+ positive.  It appears that this histopathologic information is on the mastectomy specimen. She underwent an MRI for staging of presumptive bone metastases which was performed 03/02/2014.  There were skeletal metastases in the thoracic vertebrae at 12, L1, L3, L5 and S1 vertebral body, ranging in size from 0.7-3.0 cm in size.  Ischial and right femur were also involved, as well as a large iliac mass measuring about 15 cm in the uterus. 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   Radiation Oncology consultation was performed 03/11/2014, and she was given radiation in 1 dose of 6 Gy to the right iliac lesion.        With the initial diagnosis, she initiated treatment with 6 cycles of CAF neoadjuvant chemotherapy 02/14, 07/07, 03/28, 04/18/14, 05/08 " and 05/29/14. She also received monthly zoledronic acid.  She then underwent a modified left mastectomy of Palacios type and left lymphadenoectomy on 06/27/2014.   Pathologic examination showed number at [facility] was [id number] showed infiltrative grade 2 ductal cancer with 6 level 1 metastatic lympFollow up with Jossie for visits and trastuzumab on 2-5, 2-26, 3-19 with CBC, CMP.  Echocardiogram on 3-19.  Follow up with me 4-9 with CBC, CMP, CA27.29 and CEA and with CT CAP on 4-8.h nodes and 3 level 2 metastatic lymph nodes.  The differentiation was intermediate.  The tumor was ER positive 70% of the cells, W[place] positive in 40% of the cells and HER2 was 3+ by immunohistochemistry and the Ki-67 labeling index was 20%. Her staging after surgery was stage IV, pT4b N2 M1.  I don't see a biopsy of the skeletal metastases.  She then had continuation with chemotherapy with 4 cycles of Herceptin and taxane with monthly zoledronic acid.  Tamoxifen was initiated.  She then had two years of Herceptin and a decision was made not to continue further Herceptin.  She continued on zoledronic acid every 3 months. She was clinically stable. [place] the [place] new [place] from [facility].      [place] the [place], she was switched to letrozole and continues on Herceptin q3w.  Denosumab was held due to diagnosis of osteonecrosis of the R maxilla.  4/27/21 letter from dentist stated all her dental issues were resolved.          TREATMENT HISTORY:  A. Initial diagnosis with metastatic breast cancer in [facility].  Neoadjuvant CAF x 6.   B. Left mastectomy. Left axillary node dissection.  C.  Radiation in 1 dose to R iliac region.  C. Herceptin for 2 years taxane for a prescribed course then stopped, monthly zoledronic acid.  She had 2 years of Herceptin with tamoxifen added after chemotherapy.  D.  Tamoxifen alone and zoledronic acid every 3 months.  [place]  [place]  We restarted Herceptin every 3 weeks and continued  tamoxifen. Bone targeted agent changed to denosumab every 9 weeks.  F.  Xgeva discontinued 12-17-19 because of dental issues.   G.  J+J vaccine March, 2021.  H.  Restart Xgeva 5-11-21.      INTERVAL HISTORY  She has been doing generally well.  She has some low back pain which is chronic.  No fatigue, no depression, no diarrhea.  Her dental problems were resolved about 6 months ago and she can restart Xgeva every 3 months.  Protruding bone was removed. No joint stiffness with letrozole.  She is walking daily outside or on her treadmill inside depending on the weather.  Stamina is stable.  No chest pain or ROSS.     She had the J+J vaccine.      REVIEW OF SYSTEMS:  She denies fevers or chills, cough, chest pain, shortness of breath, nausea or vomiting, constipation, diarrhea, bone pain, or headache.  She does have a chronic low back pain.  A 10 point review of systems is negative.     PHYSICAL EXAM:  Video physical exam  General: Patient appears well in no acute distress.   Skin: No visualized rash or lesions on visualized skin  Eyes: EOMI, no erythema, sclera icterus or discharge noted  Resp: Appears to be breathing comfortably without accessory muscle usage, speaking in full sentences, no cough  MSK: Appears to have normal range of motion based on visualized movements  Neurologic: No apparent tremors, facial movements symmetric  Psych: affect bright, alert and oriented    The rest of a comprehensive physical examination is deferred due to PHE (public health emergency) video restrictions     Labs     3/30/2021 12:34 4/20/2021 09:19 5/11/2021 08:11   Sodium 142 141 142   Potassium 3.8 4.0 3.8   Chloride 111 (H) 109 109   Carbon Dioxide 28 29 30   Urea Nitrogen 16 12 16   Creatinine 0.78 0.86 0.80   GFR Estimate 79 70 78   GFR Estimate If Black >90 81 90   Calcium 8.9 9.0 9.1   Anion Gap 3 3 4   Albumin 3.2 (L) 3.2 (L) 3.3 (L)   Protein Total 7.6 7.6 7.5   Bilirubin Total 0.2 0.3 0.3   Alkaline Phosphatase 66 66 76    ALT 84 (H) 71 (H) 77 (H)   AST 57 (H) 38 37   CA 27-29 8 6    Glucose 79 82 126 (H)   WBC 8.6 6.9 6.5   Hemoglobin 11.9 12.1 12.0   Hematocrit 38.6 39.1 38.3   Platelet Count 232 221 228   RBC Count 4.10 4.23 4.11   MCV 94 92 93     Imaging   Reviewed last CT CAP- stable     ASSESSMENT AND PLAN:     1.  Hoda Brush is a 66-year-old woman with a history of ER-positive, RI-positive, HER2 positive breast cancer.  She is from [facility], formally from [facility], and came to live in the [place] with her daughter.  She is here for continuation of every 3-week Herceptin, and daily letrozole.  The tumor is ER positive, RI positive and HER2 positive.  She had metastatic disease at the time of presentation with bone-only metastases by report.  She underwent neoadjuvant CAF, had a left mastectomy, left axillary lymph node dissection, radiation to the right hip, which included the right iliac region where she had metastatic disease.  She was initially on tamoxifen but then was switched to letrozole.  She continues on this and every 3-week trastuzumab.  She has had no evidence of disease progression for the last 3 years.  We have continued Herceptin every 3 weeks as well as daily letrozole. CT CAP from March shows stable disease.   2.  Mild transaminase elevations.  Restaging showed no evidence of liver metastases.  There is fatty infiltration noted on CT.   3.   Echo. Stable EF. --Echo in  showed normal EF without change 60 to 65%. 2-16-21.  4.  Discussion of bone health and right maxillary osteonecrosis.  Right mandible problem was treated by her dentist and has resolved.We received letter stating we are cleared to restart her Xgeva.  She is on oral calcium + vit D.  AMEND: insurance required switch to Zometa, new orders written for every 3 months.   5. Glucose was elevated today, usually within normal range.  Weight is up a couple pounds, encouraged daily exercise.       20 minutes spent on the date of the encounter  doing chart review, lab review, imaging review, patient visit, documentation.       Originating Location (pt. Location): [facility]     Distant Location (provider location):  [facility]     Platform used for Video Visit: KAYE Beasley PA-C

## 2021-05-11 NOTE — NURSING NOTE
Chief Complaint   Patient presents with     Port Draw     Labs drawn via port by RN in lab. VS taken.      Port accessed with 20 gauge 3/4 inch gripper needle and labs drawn by rn.  Port flushed with NS and heparin.  Pt tolerated well.  VS taken.     Ashlyn Wilson RN

## 2021-05-11 NOTE — PROGRESS NOTES
Infusion Nursing Note:  Hoda Brush presents today for C63 D1 Herceptin/Zometa.    Patient seen by provider today: Yes: SEMAJ Del Rio   present during visit today: Not Applicable.    Note: Patient arrives feeling well. Has had issues with insurance coverage with Xgeva so will receive Zometa today. Has dental clearance per SEMAJ Del Rio. Pt educated on risk of side effects with first-time Zometa.    Intravenous Access:  Peripheral IV placed.    Treatment Conditions:  Lab Results   Component Value Date    HGB 12.0 05/11/2021     Lab Results   Component Value Date    WBC 6.5 05/11/2021      Lab Results   Component Value Date    ANEU 3.6 05/11/2021     Lab Results   Component Value Date     05/11/2021      Lab Results   Component Value Date     05/11/2021                   Lab Results   Component Value Date    POTASSIUM 3.8 05/11/2021           No results found for: MAG         Lab Results   Component Value Date    CR 0.80 05/11/2021                   Lab Results   Component Value Date    TAYLER 9.1 05/11/2021                Lab Results   Component Value Date    BILITOTAL 0.3 05/11/2021           Lab Results   Component Value Date    ALBUMIN 3.3 05/11/2021                    Lab Results   Component Value Date    ALT 77 05/11/2021           Lab Results   Component Value Date    AST 37 05/11/2021       Results reviewed, labs MET treatment parameters, ok to proceed with treatment.  ECHO/MUGA completed 2/16/21  EF 60-65%. Next ECHO scheduled for 6/1/21.      Post Infusion Assessment:  Patient tolerated infusion without incident.  Blood return noted pre and post infusion.  Site patent and intact, free from redness, edema or discomfort.  No evidence of extravasations.  Access discontinued per protocol.       Discharge Plan:   Patient declined prescription refills.  Discharge instructions reviewed with: Patient.  Patient and/or family verbalized understanding of discharge instructions  and all questions answered.  AVS to patient via [employer].  Patient will return 6/1 for next appointment.   Patient discharged in stable condition accompanied by: self.  Departure Mode: Ambulatory.    Emily Cullen RN

## 2021-05-12 ENCOUNTER — MYC MEDICAL ADVICE (OUTPATIENT)
Dept: ONCOLOGY | Facility: CLINIC | Age: 66
End: 2021-05-12

## 2021-05-13 NOTE — TELEPHONE ENCOUNTER
"[facility] Triage Telephone Call    Called patient in follow up to my chart message.  She relates that is swelling in her eyes is better than yesterday.  Infusion was yesterday.     When eyes were swollen yesterday she denies other symptoms including headache, vision changes, rash, chest pain, or SOB.    Feels OK now and she has also asked that I call her daughter to check in.      Daughter agrees that she is better today.    After infusion she did not remember the side effects that she was told about and when they got to ED she felt better and swelling was decreasing so they went home.  She took Zyrtec in case it was allergies.     In the past \"she had infusion for her bones and has side effects from that also.\"   \"however states that this side effect was worse.\"    Provided triage contact information and explained our role in her mother's care.  Will forward information to her care team for review.     Jacque Cervantes RN   BSN, [facility], [facility]  [facility] Triage      "

## 2021-06-01 ENCOUNTER — INFUSION THERAPY VISIT (OUTPATIENT)
Dept: ONCOLOGY | Facility: CLINIC | Age: 66
End: 2021-06-01
Attending: INTERNAL MEDICINE
Payer: MEDICAID

## 2021-06-01 ENCOUNTER — APPOINTMENT (OUTPATIENT)
Dept: LAB | Facility: CLINIC | Age: 66
End: 2021-06-01
Attending: INTERNAL MEDICINE
Payer: MEDICAID

## 2021-06-01 ENCOUNTER — ANCILLARY PROCEDURE (OUTPATIENT)
Dept: CARDIOLOGY | Facility: CLINIC | Age: 66
End: 2021-06-01
Attending: INTERNAL MEDICINE
Payer: MEDICAID

## 2021-06-01 VITALS
WEIGHT: 179 LBS | OXYGEN SATURATION: 98 % | HEART RATE: 71 BPM | SYSTOLIC BLOOD PRESSURE: 110 MMHG | BODY MASS INDEX: 31.72 KG/M2 | RESPIRATION RATE: 16 BRPM | TEMPERATURE: 97.8 F | DIASTOLIC BLOOD PRESSURE: 69 MMHG

## 2021-06-01 DIAGNOSIS — C50.912 MALIGNANT NEOPLASM OF LEFT FEMALE BREAST, UNSPECIFIED ESTROGEN RECEPTOR STATUS, UNSPECIFIED SITE OF BREAST (H): Primary | ICD-10-CM

## 2021-06-01 DIAGNOSIS — Z51.11 ENCOUNTER FOR ANTINEOPLASTIC CHEMOTHERAPY: ICD-10-CM

## 2021-06-01 LAB
ALBUMIN SERPL-MCNC: 3.3 G/DL (ref 3.4–5)
ALP SERPL-CCNC: 64 U/L (ref 40–150)
ALT SERPL W P-5'-P-CCNC: 50 U/L (ref 0–50)
ANION GAP SERPL CALCULATED.3IONS-SCNC: 6 MMOL/L (ref 3–14)
AST SERPL W P-5'-P-CCNC: 35 U/L (ref 0–45)
BASOPHILS # BLD AUTO: 0 10E9/L (ref 0–0.2)
BASOPHILS NFR BLD AUTO: 0.3 %
BILIRUB SERPL-MCNC: 0.4 MG/DL (ref 0.2–1.3)
BUN SERPL-MCNC: 15 MG/DL (ref 7–30)
CALCIUM SERPL-MCNC: 8.8 MG/DL (ref 8.5–10.1)
CANCER AG27-29 SERPL-ACNC: 7 U/ML (ref 0–39)
CEA SERPL-MCNC: <0.5 UG/L (ref 0–2.5)
CHLORIDE SERPL-SCNC: 109 MMOL/L (ref 94–109)
CO2 SERPL-SCNC: 27 MMOL/L (ref 20–32)
CREAT SERPL-MCNC: 0.78 MG/DL (ref 0.52–1.04)
DIFFERENTIAL METHOD BLD: ABNORMAL
EOSINOPHIL # BLD AUTO: 0.2 10E9/L (ref 0–0.7)
EOSINOPHIL NFR BLD AUTO: 3.5 %
ERYTHROCYTE [DISTWIDTH] IN BLOOD BY AUTOMATED COUNT: 13.8 % (ref 10–15)
GFR SERPL CREATININE-BSD FRML MDRD: 79 ML/MIN/{1.73_M2}
GLUCOSE SERPL-MCNC: 90 MG/DL (ref 70–99)
HCT VFR BLD AUTO: 37.8 % (ref 35–47)
HGB BLD-MCNC: 11.8 G/DL (ref 11.7–15.7)
IMM GRANULOCYTES # BLD: 0 10E9/L (ref 0–0.4)
IMM GRANULOCYTES NFR BLD: 0.2 %
LYMPHOCYTES # BLD AUTO: 2.4 10E9/L (ref 0.8–5.3)
LYMPHOCYTES NFR BLD AUTO: 40.6 %
MCH RBC QN AUTO: 29.2 PG (ref 26.5–33)
MCHC RBC AUTO-ENTMCNC: 31.2 G/DL (ref 31.5–36.5)
MCV RBC AUTO: 94 FL (ref 78–100)
MONOCYTES # BLD AUTO: 0.5 10E9/L (ref 0–1.3)
MONOCYTES NFR BLD AUTO: 7.7 %
NEUTROPHILS # BLD AUTO: 2.9 10E9/L (ref 1.6–8.3)
NEUTROPHILS NFR BLD AUTO: 47.7 %
NRBC # BLD AUTO: 0 10*3/UL
NRBC BLD AUTO-RTO: 0 /100
PLATELET # BLD AUTO: 218 10E9/L (ref 150–450)
POTASSIUM SERPL-SCNC: 4.2 MMOL/L (ref 3.4–5.3)
PROT SERPL-MCNC: 7.4 G/DL (ref 6.8–8.8)
RBC # BLD AUTO: 4.04 10E12/L (ref 3.8–5.2)
SODIUM SERPL-SCNC: 143 MMOL/L (ref 133–144)
WBC # BLD AUTO: 6 10E9/L (ref 4–11)

## 2021-06-01 PROCEDURE — T1013 SIGN LANG/ORAL INTERPRETER: HCPCS | Mod: GT

## 2021-06-01 PROCEDURE — 86300 IMMUNOASSAY TUMOR CA 15-3: CPT | Performed by: INTERNAL MEDICINE

## 2021-06-01 PROCEDURE — 258N000003 HC RX IP 258 OP 636: Performed by: INTERNAL MEDICINE

## 2021-06-01 PROCEDURE — 82378 CARCINOEMBRYONIC ANTIGEN: CPT | Performed by: INTERNAL MEDICINE

## 2021-06-01 PROCEDURE — 85025 COMPLETE CBC W/AUTO DIFF WBC: CPT | Performed by: INTERNAL MEDICINE

## 2021-06-01 PROCEDURE — 80053 COMPREHEN METABOLIC PANEL: CPT | Performed by: INTERNAL MEDICINE

## 2021-06-01 PROCEDURE — 93306 TTE W/DOPPLER COMPLETE: CPT | Performed by: STUDENT IN AN ORGANIZED HEALTH CARE EDUCATION/TRAINING PROGRAM

## 2021-06-01 PROCEDURE — 96413 CHEMO IV INFUSION 1 HR: CPT

## 2021-06-01 PROCEDURE — 250N000011 HC RX IP 250 OP 636: Performed by: INTERNAL MEDICINE

## 2021-06-01 RX ORDER — HEPARIN SODIUM (PORCINE) LOCK FLUSH IV SOLN 100 UNIT/ML 100 UNIT/ML
5 SOLUTION INTRAVENOUS EVERY 8 HOURS PRN
Status: DISCONTINUED | OUTPATIENT
Start: 2021-06-01 | End: 2021-06-01 | Stop reason: HOSPADM

## 2021-06-01 RX ORDER — HEPARIN SODIUM (PORCINE) LOCK FLUSH IV SOLN 100 UNIT/ML 100 UNIT/ML
5 SOLUTION INTRAVENOUS EVERY 8 HOURS
Status: DISCONTINUED | OUTPATIENT
Start: 2021-06-01 | End: 2021-06-01 | Stop reason: HOSPADM

## 2021-06-01 RX ADMIN — SODIUM CHLORIDE 250 ML: 9 INJECTION, SOLUTION INTRAVENOUS at 10:58

## 2021-06-01 RX ADMIN — TRASTUZUMAB 450 MG: 150 INJECTION, POWDER, LYOPHILIZED, FOR SOLUTION INTRAVENOUS at 11:13

## 2021-06-01 RX ADMIN — Medication 5 ML: at 11:44

## 2021-06-01 RX ADMIN — Medication 5 ML: at 09:28

## 2021-06-01 ASSESSMENT — PAIN SCALES - GENERAL: PAINLEVEL: NO PAIN (0)

## 2021-06-01 NOTE — PATIENT INSTRUCTIONS
[facility] Main Line: [phone]    Call triage nurse with chills and/or temperature greater than or equal to 100.4, uncontrolled nausea/vomiting, diarrhea, constipation, dizziness, shortness of breath, chest pain, bleeding, unexplained bruising, or any new/concerning symptoms, questions/concerns.   If you are having any concerning symptoms or wish to speak to a provider before your next infusion visit, please call your care coordinator or triage to notify them so we can adequately serve you.   Triage Nurse Line: [phone]    If after hours, weekends, or holidays [phone]      Lab Results:  Recent Results (from the past 12 hour(s))   CBC with platelets differential    Collection Time: 06/01/21  9:30 AM   Result Value Ref Range    WBC 6.0 4.0 - 11.0 10e9/L    RBC Count 4.04 3.8 - 5.2 10e12/L    Hemoglobin 11.8 11.7 - 15.7 g/dL    Hematocrit 37.8 35.0 - 47.0 %    MCV 94 78 - 100 fl    MCH 29.2 26.5 - 33.0 pg    MCHC 31.2 (L) 31.5 - 36.5 g/dL    RDW 13.8 10.0 - 15.0 %    Platelet Count 218 150 - 450 10e9/L    Diff Method Automated Method     % Neutrophils 47.7 %    % Lymphocytes 40.6 %    % Monocytes 7.7 %    % Eosinophils 3.5 %    % Basophils 0.3 %    % Immature Granulocytes 0.2 %    Nucleated RBCs 0 0 /100    Absolute Neutrophil 2.9 1.6 - 8.3 10e9/L    Absolute Lymphocytes 2.4 0.8 - 5.3 10e9/L    Absolute Monocytes 0.5 0.0 - 1.3 10e9/L    Absolute Eosinophils 0.2 0.0 - 0.7 10e9/L    Absolute Basophils 0.0 0.0 - 0.2 10e9/L    Abs Immature Granulocytes 0.0 0 - 0.4 10e9/L    Absolute Nucleated RBC 0.0    Comprehensive metabolic panel    Collection Time: 06/01/21  9:30 AM   Result Value Ref Range    Sodium 143 133 - 144 mmol/L    Potassium 4.2 3.4 - 5.3 mmol/L    Chloride 109 94 - 109 mmol/L    Carbon Dioxide 27 20 - 32 mmol/L    Anion Gap 6 3 - 14 mmol/L    Glucose 90 70 - 99 mg/dL    Urea Nitrogen 15 7 - 30 mg/dL    Creatinine 0.78 0.52 - 1.04 mg/dL    GFR Estimate 79 >60 mL/min/[1.73_m2]    GFR  Estimate If Black >90 >60 mL/min/[1.73_m2]    Calcium 8.8 8.5 - 10.1 mg/dL    Bilirubin Total 0.4 0.2 - 1.3 mg/dL    Albumin 3.3 (L) 3.4 - 5.0 g/dL    Protein Total 7.4 6.8 - 8.8 g/dL    Alkaline Phosphatase 64 40 - 150 U/L    ALT 50 0 - 50 U/L    AST 35 0 - 45 U/L

## 2021-06-01 NOTE — PROGRESS NOTES
Infusion Nursing Note:  Hoda Brush presents today for Cycle 64 Day 1 trastuzumab.    Patient seen by provider today: No   present during visit today: Not Applicable.    Note: Patient presents today feeling well. Denies pain or nausea/vomiting. Denies fevers/chills. Denies SOB, cough, chest pain, or dizziness/lightheadedness. Denies constipation/diarrhea. Denies urinary issues. Denies neuropathy. Offers no new concerns at this appointment.      Intravenous Access:  Implanted Port.    Treatment Conditions:  Lab Results   Component Value Date    HGB 11.8 06/01/2021     Lab Results   Component Value Date    WBC 6.0 06/01/2021      Lab Results   Component Value Date    ANEU 2.9 06/01/2021     Lab Results   Component Value Date     06/01/2021      Lab Results   Component Value Date     06/01/2021                   Lab Results   Component Value Date    POTASSIUM 4.2 06/01/2021              Lab Results   Component Value Date    CR 0.78 06/01/2021                   Lab Results   Component Value Date    TAYLER 8.8 06/01/2021                Lab Results   Component Value Date    BILITOTAL 0.4 06/01/2021           Lab Results   Component Value Date    ALBUMIN 3.3 06/01/2021                    Lab Results   Component Value Date    ALT 50 06/01/2021           Lab Results   Component Value Date    AST 35 06/01/2021       Results reviewed, labs MET treatment parameters, ok to proceed with treatment.  ECHO/MUGA completed 06/01/21  EF 55-60%.      Post Infusion Assessment:  Patient tolerated infusion without incident.  Blood return noted pre and post infusion.  Site patent and intact, free from redness, edema or discomfort.  No evidence of extravasations.  Access discontinued per protocol.       Discharge Plan:   Patient declined prescription refills.  Discharge instructions reviewed with: Patient.  Patient and/or family verbalized understanding of discharge instructions and all questions answered.  AVS  to patient via [employer].  Patient will return 06/22 for next infusion appointment.   Patient discharged in stable condition accompanied by: self.  Departure Mode: Ambulatory.      Olivia Bains RN

## 2021-06-13 ENCOUNTER — TELEPHONE (OUTPATIENT)
Dept: ONCOLOGY | Facility: CLINIC | Age: 66
End: 2021-06-13

## 2021-06-13 NOTE — TELEPHONE ENCOUNTER
I called Hoda but her daughter Ashleigh was not there. We were unable to discuss her Zometa reaction to see if we need to change to Xgeva.  I called Ashleigh at [phone] to call Cortney Monday [phone] to discuss the reaction and discuss whether we need to switch to Xgeva.     Lisandro Aguiar MD

## 2021-06-14 ENCOUNTER — DOCUMENTATION ONLY (OUTPATIENT)
Dept: ONCOLOGY | Facility: CLINIC | Age: 66
End: 2021-06-14

## 2021-06-14 ENCOUNTER — PATIENT OUTREACH (OUTPATIENT)
Dept: ONCOLOGY | Facility: CLINIC | Age: 66
End: 2021-06-14

## 2021-06-14 DIAGNOSIS — C50.912 MALIGNANT NEOPLASM OF LEFT FEMALE BREAST, UNSPECIFIED ESTROGEN RECEPTOR STATUS, UNSPECIFIED SITE OF BREAST (H): Primary | ICD-10-CM

## 2021-06-14 NOTE — PROGRESS NOTES
Spoke to patient's daughter Shyla ([phone]) with reaction to Zometa 5/11/2021 and Dr Aguiar's recommendations to switch to XGEVA for August 2021. Writer spoke to infusion pharmacy to work on coverage and contact patient's daughter when they get approval as she is requesting. Message sent to scheduling to arrange the XGEVA appointment for August.  Answered all patient's daughter's questions and verbalized understanding. Cortney Ramos RN, BSN.

## 2021-06-16 DIAGNOSIS — C50.912 MALIGNANT NEOPLASM OF LEFT FEMALE BREAST, UNSPECIFIED ESTROGEN RECEPTOR STATUS, UNSPECIFIED SITE OF BREAST (H): Primary | ICD-10-CM

## 2021-06-21 ENCOUNTER — ANCILLARY PROCEDURE (OUTPATIENT)
Dept: CT IMAGING | Facility: CLINIC | Age: 66
End: 2021-06-21
Attending: INTERNAL MEDICINE
Payer: MEDICAID

## 2021-06-21 DIAGNOSIS — C50.912 MALIGNANT NEOPLASM OF LEFT FEMALE BREAST, UNSPECIFIED ESTROGEN RECEPTOR STATUS, UNSPECIFIED SITE OF BREAST (H): ICD-10-CM

## 2021-06-21 PROCEDURE — 71260 CT THORAX DX C+: CPT | Performed by: RADIOLOGY

## 2021-06-21 PROCEDURE — 74177 CT ABD & PELVIS W/CONTRAST: CPT | Performed by: RADIOLOGY

## 2021-06-21 RX ORDER — HEPARIN SODIUM (PORCINE) LOCK FLUSH IV SOLN 100 UNIT/ML 100 UNIT/ML
300 SOLUTION INTRAVENOUS ONCE
Status: COMPLETED | OUTPATIENT
Start: 2021-06-21 | End: 2021-06-21

## 2021-06-21 RX ORDER — IOPAMIDOL 755 MG/ML
109 INJECTION, SOLUTION INTRAVASCULAR ONCE
Status: COMPLETED | OUTPATIENT
Start: 2021-06-21 | End: 2021-06-21

## 2021-06-21 RX ADMIN — HEPARIN SODIUM (PORCINE) LOCK FLUSH IV SOLN 100 UNIT/ML 300 UNITS: 100 SOLUTION at 11:22

## 2021-06-21 RX ADMIN — IOPAMIDOL 109 ML: 755 INJECTION, SOLUTION INTRAVASCULAR at 11:12

## 2021-06-21 NOTE — PROGRESS NOTES
"Hoda is a 66 year old who is being evaluated via a billable video visit.      How would you like to obtain your AVS? MyChart  If the video visit is dropped, the invitation should be resent by: Text to cell phone: [phone]  Will anyone else be joining your video visit? No     Vitals - Patient Reported  Weight (Patient Reported): 80.7 kg (178 lb)  Height (Patient Reported): 157.5 cm (5' 2\")  BMI (Based on Pt Reported Ht/Wt): 32.56  Pain Score: No Pain (0)    Marycarmen Martins CMA June 22, 2021  8:22 AM         Video Start Time: 9:10  Video-Visit Details    Type of service:  Video Visit    Video End Time: 9:38    Originating Location (pt. Location): Home    Distant Location (provider location):  [facility]     Platform used for Video Visit: Nidia Drummond is a patient from [facility] and is seen here for continuation of care for her metastatic ER+HER2- breast cancer.  She is a [employer] refugee from [facility] and was initially seen in clinic with her daughter.  The only data we have from [facility] is an English translation of her records.       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  On 02/11/2014, she was seen by an oncologist.  The clinical staging of T4b N2 M1 was noted.   Her breast cancer was on the left side. There was no right breast cancer, confirmed by the patient and her daughter, correcting a possible error in the translated records.  ER was positive in 100% of the cells, SC at 14% and HER2 was 3+ positive.  It appears that this histopathologic information is on the mastectomy specimen. She underwent an MRI for staging of presumptive bone metastases which was performed 03/02/2014.  There were skeletal metastases in the thoracic vertebrae at 12, L1, L3, L5 and S1 vertebral body, ranging in size from 0.7-3.0 cm in size.  Ischial and right femur were also involved, as " well as a large iliac mass measuring about 15 cm in the uterus. 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   Radiation Oncology consultation was performed 03/11/2014, and she was given radiation in 1 dose of 6 Gy to the right iliac lesion.        With the initial diagnosis, she initiated treatment with 6 cycles of CAF neoadjuvant chemotherapy 02/14, 07/07, 03/28, 04/18/14, 05/08 and 05/29/14. She also received monthly zoledronic acid.  She then underwent a modified left mastectomy of Palacios type and left lymphadenoectomy on 06/27/2014.   Pathologic examination showed number at [facility] was [id number] showed infiltrative grade 2 ductal cancer with 6 level 1 metastatic lympFollow up with [facility] for visits and trastuzumab on 2-5, 2-26, 3-19 with CBC, CMP.  Echocardiogram on 3-19.  Follow up with me 4-9 with CBC, CMP, CA27.29 and CEA and with CT CAP on 4-8.h nodes and 3 level 2 metastatic lymph nodes.  The differentiation was intermediate.  The tumor was ER positive 70% of the cells, OR positive in 40% of the cells and HER2 was 3+ by immunohistochemistry and the Ki-67 labeling index was 20%. Her staging after surgery was stage IV, pT4b N2 M1.  I don't see a biopsy of the skeletal metastases.  She then had continuation with chemotherapy with 4 cycles of Herceptin and taxane with monthly zoledronic acid.  Tamoxifen was initiated.  She then had two years of Herceptin and a decision was made not to continue further Herceptin.  She continued on zoledronic acid every 3 months. She was clinically stable. [place] then [place] the [place] new [place] from [facility].  She has now been changed to letrozole.  Denosumab has now been held for new diagnosis of osteonecrosis of the R maxilla.     History of cholecystectomy 2020.      TREATMENT HISTORY:  A. Initial diagnosis with metastatic breast cancer in [facility].  Neoadjuvant CAF x 6.   B. Left mastectomy. Left axillary node dissection.  C.  Radiation in 1 dose to R iliac region.  C. Herceptin for  2 years taxane for a prescribed course then stopped, monthly zoledronic acid.  She had 2 years of Herceptin with tamoxifen added after chemotherapy.  D.  Tamoxifen alone and zoledronic acid every 3 months starting 2014.  Letrozole was started   E.  [place]  We restarted Herceptin every 3 weeks and continued tamoxifen. Bone targeted agent changed to denosumab every 9 weeks. Zometa discontinued 2017.  Tamoxifen was changed to letrozole August 2019.    F.  Xgeva discontinued 12-17-19 because of dental issues.   G.  J+J vaccine March, 2021.  H.  Was on Zometa once May 11.  Reaction with eye lid swelling. Discontinued Zometal  H.  Restart Xgeva 6-22-21.  Continuing the trastuzumab and letrozole.  Both tolerated well.       INTERVAL HISTORY  She has been doing generally well.  She has some low back pain which is chronic.  No fatigue, no depression, no diarrhea.  Her dental problems were resolved about 6 months ago and she can restart Xgeva every 3 months.  Protruding bone was removed. No joint stiffness with letrozole.      She had the J+J vaccine.      REVIEW OF SYSTEMS:  She denies fevers or chills, cough, chest pain, shortness of breath, nausea or vomiting, constipation, diarrhea, bone pain, or headache.  She does have a chronic low back pain.  A 10 point review of systems is negative.     PHYSICAL EXAM:    Hoda appeared generally well.  No alopecia.  Mood and affect normal.      Labs  CMP was normal.  CBC normal.        Imaging   CT CAP 6-21-21  IMPRESSION:  1.  No significant change in the sclerotic osseous metastasis.  2.  Stable pulmonary nodules measuring up to 6 mm.  3.  No new metastatic disease.     MARYANNE FUNEZ MD    Global and regional left ventricular function is normal with an EF of 55-60%.     ASSESSMENT AND PLAN:     1.  Hoda Brush is a 66-year-old woman with a history of ER-positive, DE-positive, HER2 positive breast cancer.  She is from [facility], formally from [facility], and came to live in  the [place].  She is here for continuation of every 3-week Herceptin, which she has bee no.  N receiving along with tamoxifen daily.  The tumor is ER positive, NH positive and HER2 positive.  She had metastatic disease at the time of presentation with bone-only metastases by report.  She underwent neoadjuvant CAF, had a left mastectomy, left axillary lymph node dissection, radiation to the right hip, which included the right iliac region where she had metastatic disease.  She was initially on tamoxifen but then was switched to letrozole.  She continues on this and every 3-week trastuzumab.  She has had no evidence of disease progression for the last 3 years.  We have continued Herceptin every 3 weeks as well as daily letrozole. CT CAP yesterday showed stable disease.    2.  Restaging. We reviewed imaging with her and she is pleased that there is no evidence of progression of her metastatic breast cancer. --Continue to tolerate treatment well. - Tumor markers  have been stable.    3.  Mild transaminase elevations.  Restaging showed no evidence of liver metastases.  These LFT elevations may be due to fatty liver.  4.  Continue the letrozole.   5.  Echo. Stable EF. --Echo in  showed normal EF without change 60 to 65%. 2-16-21.  6.  Discussion of bone health and right maxillary osteonecrosis.  She will continue with calcium and vitamin D.  Restart Xgeva in 3 weeks for osteoporosis because right mandible problem was treated by her dentist and has resolved.   7. Follow up.     Trastuzumab and Xgeva today.  Trastuzumab July 20 with CBC and CMP and KARISSA visit.  She is taking a vacation between now and then.  Trastuzumab August 10, August 31 September 21, October 12 CBC and CMP all visits.  CT CAP October 11 with CBC and CMP.  Echocardiogram August 31.  Xgeva every other visit, next July 20.  Visit with me August 31 and October 12.        Thank you for allowing us to participate in this patient's care.       Sincerely,      Lisandro Aguiar MD  Professor  [facility]  [phone].       Patient was interviewed and discussed with Dr. Aguiar     ADDENDUM:  4-18-21 Xgeva removed because of insurance issues.  We will consider Zometa once we have a letter from her dentist saying that it is OK.      910 2 9:38 AM  I spent 28 minutes with the patient more than 50% of which was in counseling and coordination of care.

## 2021-06-22 ENCOUNTER — INFUSION THERAPY VISIT (OUTPATIENT)
Dept: ONCOLOGY | Facility: CLINIC | Age: 66
End: 2021-06-22
Attending: INTERNAL MEDICINE
Payer: MEDICAID

## 2021-06-22 ENCOUNTER — APPOINTMENT (OUTPATIENT)
Dept: LAB | Facility: CLINIC | Age: 66
End: 2021-06-22
Attending: INTERNAL MEDICINE
Payer: MEDICAID

## 2021-06-22 VITALS
OXYGEN SATURATION: 95 % | TEMPERATURE: 98 F | WEIGHT: 178 LBS | DIASTOLIC BLOOD PRESSURE: 81 MMHG | SYSTOLIC BLOOD PRESSURE: 135 MMHG | HEART RATE: 81 BPM | BODY MASS INDEX: 31.54 KG/M2 | RESPIRATION RATE: 16 BRPM

## 2021-06-22 DIAGNOSIS — C50.912 MALIGNANT NEOPLASM OF LEFT FEMALE BREAST, UNSPECIFIED ESTROGEN RECEPTOR STATUS, UNSPECIFIED SITE OF BREAST (H): Primary | ICD-10-CM

## 2021-06-22 DIAGNOSIS — K21.9 GASTROESOPHAGEAL REFLUX DISEASE WITHOUT ESOPHAGITIS: ICD-10-CM

## 2021-06-22 DIAGNOSIS — Z51.11 ENCOUNTER FOR ANTINEOPLASTIC CHEMOTHERAPY: ICD-10-CM

## 2021-06-22 DIAGNOSIS — C79.51 BONE METASTASIS: Primary | ICD-10-CM

## 2021-06-22 DIAGNOSIS — C50.919 METASTATIC BREAST CANCER: ICD-10-CM

## 2021-06-22 DIAGNOSIS — C79.51 BONE METASTASIS: ICD-10-CM

## 2021-06-22 LAB
ALBUMIN SERPL-MCNC: 3.4 G/DL (ref 3.4–5)
ALP SERPL-CCNC: 79 U/L (ref 40–150)
ALT SERPL W P-5'-P-CCNC: 80 U/L (ref 0–50)
ANION GAP SERPL CALCULATED.3IONS-SCNC: 7 MMOL/L (ref 3–14)
AST SERPL W P-5'-P-CCNC: 48 U/L (ref 0–45)
BASOPHILS # BLD AUTO: 0 10E9/L (ref 0–0.2)
BASOPHILS NFR BLD AUTO: 0.5 %
BILIRUB SERPL-MCNC: 0.3 MG/DL (ref 0.2–1.3)
BUN SERPL-MCNC: 15 MG/DL (ref 7–30)
CALCIUM SERPL-MCNC: 9.6 MG/DL (ref 8.5–10.1)
CANCER AG27-29 SERPL-ACNC: 15 U/ML (ref 0–39)
CEA SERPL-MCNC: <0.5 UG/L (ref 0–2.5)
CHLORIDE SERPL-SCNC: 105 MMOL/L (ref 94–109)
CO2 SERPL-SCNC: 29 MMOL/L (ref 20–32)
CREAT SERPL-MCNC: 0.81 MG/DL (ref 0.52–1.04)
DIFFERENTIAL METHOD BLD: NORMAL
EOSINOPHIL # BLD AUTO: 0.2 10E9/L (ref 0–0.7)
EOSINOPHIL NFR BLD AUTO: 3.4 %
ERYTHROCYTE [DISTWIDTH] IN BLOOD BY AUTOMATED COUNT: 13.9 % (ref 10–15)
GFR SERPL CREATININE-BSD FRML MDRD: 76 ML/MIN/{1.73_M2}
GLUCOSE SERPL-MCNC: 85 MG/DL (ref 70–99)
HCT VFR BLD AUTO: 39 % (ref 35–47)
HGB BLD-MCNC: 12.5 G/DL (ref 11.7–15.7)
IMM GRANULOCYTES # BLD: 0 10E9/L (ref 0–0.4)
IMM GRANULOCYTES NFR BLD: 0.3 %
LYMPHOCYTES # BLD AUTO: 2.7 10E9/L (ref 0.8–5.3)
LYMPHOCYTES NFR BLD AUTO: 41.5 %
MCH RBC QN AUTO: 29.8 PG (ref 26.5–33)
MCHC RBC AUTO-ENTMCNC: 32.1 G/DL (ref 31.5–36.5)
MCV RBC AUTO: 93 FL (ref 78–100)
MONOCYTES # BLD AUTO: 0.4 10E9/L (ref 0–1.3)
MONOCYTES NFR BLD AUTO: 6.5 %
NEUTROPHILS # BLD AUTO: 3.1 10E9/L (ref 1.6–8.3)
NEUTROPHILS NFR BLD AUTO: 47.8 %
NRBC # BLD AUTO: 0 10*3/UL
NRBC BLD AUTO-RTO: 0 /100
PLATELET # BLD AUTO: 236 10E9/L (ref 150–450)
POTASSIUM SERPL-SCNC: 4 MMOL/L (ref 3.4–5.3)
PROT SERPL-MCNC: 8 G/DL (ref 6.8–8.8)
RBC # BLD AUTO: 4.2 10E12/L (ref 3.8–5.2)
SODIUM SERPL-SCNC: 141 MMOL/L (ref 133–144)
WBC # BLD AUTO: 6.5 10E9/L (ref 4–11)

## 2021-06-22 PROCEDURE — 99214 OFFICE O/P EST MOD 30 MIN: CPT | Mod: 95 | Performed by: INTERNAL MEDICINE

## 2021-06-22 PROCEDURE — 258N000003 HC RX IP 258 OP 636: Performed by: PHYSICIAN ASSISTANT

## 2021-06-22 PROCEDURE — 96413 CHEMO IV INFUSION 1 HR: CPT

## 2021-06-22 PROCEDURE — 250N000011 HC RX IP 250 OP 636: Performed by: PHYSICIAN ASSISTANT

## 2021-06-22 PROCEDURE — 82378 CARCINOEMBRYONIC ANTIGEN: CPT | Performed by: PHYSICIAN ASSISTANT

## 2021-06-22 PROCEDURE — 96372 THER/PROPH/DIAG INJ SC/IM: CPT | Mod: 59 | Performed by: INTERNAL MEDICINE

## 2021-06-22 PROCEDURE — 86300 IMMUNOASSAY TUMOR CA 15-3: CPT | Performed by: PHYSICIAN ASSISTANT

## 2021-06-22 PROCEDURE — 80053 COMPREHEN METABOLIC PANEL: CPT | Performed by: PHYSICIAN ASSISTANT

## 2021-06-22 PROCEDURE — 85025 COMPLETE CBC W/AUTO DIFF WBC: CPT | Performed by: PHYSICIAN ASSISTANT

## 2021-06-22 PROCEDURE — 250N000011 HC RX IP 250 OP 636: Performed by: INTERNAL MEDICINE

## 2021-06-22 PROCEDURE — 96372 THER/PROPH/DIAG INJ SC/IM: CPT | Performed by: INTERNAL MEDICINE

## 2021-06-22 RX ORDER — HEPARIN SODIUM (PORCINE) LOCK FLUSH IV SOLN 100 UNIT/ML 100 UNIT/ML
5 SOLUTION INTRAVENOUS EVERY 8 HOURS
Status: DISCONTINUED | OUTPATIENT
Start: 2021-06-22 | End: 2021-06-22 | Stop reason: HOSPADM

## 2021-06-22 RX ORDER — HEPARIN SODIUM (PORCINE) LOCK FLUSH IV SOLN 100 UNIT/ML 100 UNIT/ML
5 SOLUTION INTRAVENOUS ONCE
Status: COMPLETED | OUTPATIENT
Start: 2021-06-22 | End: 2021-06-22

## 2021-06-22 RX ORDER — FAMOTIDINE 20 MG/1
20 TABLET, FILM COATED ORAL 2 TIMES DAILY
Qty: 180 TABLET | Refills: 3 | Status: SHIPPED | OUTPATIENT
Start: 2021-06-22 | End: 2022-07-29

## 2021-06-22 RX ADMIN — TRASTUZUMAB 450 MG: 150 INJECTION, POWDER, LYOPHILIZED, FOR SOLUTION INTRAVENOUS at 13:15

## 2021-06-22 RX ADMIN — Medication 5 ML: at 13:48

## 2021-06-22 RX ADMIN — Medication 5 ML: at 12:24

## 2021-06-22 RX ADMIN — DENOSUMAB 120 MG: 120 INJECTION SUBCUTANEOUS at 14:38

## 2021-06-22 ASSESSMENT — PAIN SCALES - GENERAL: PAINLEVEL: NO PAIN (0)

## 2021-06-22 NOTE — PATIENT INSTRUCTIONS
[facility] Triage and after hours / weekends / holidays:  [phone]    Please call the triage or after hours line if you experience a temperature greater than or equal to 100.5, shaking chills, have uncontrolled nausea, vomiting and/or diarrhea, dizziness, shortness of breath, chest pain, bleeding, unexplained bruising, or if you have any other new/concerning symptoms, questions or concerns.      If you are having any concerning symptoms or wish to speak to a provider before your next infusion visit, please call your care coordinator or triage to notify them so we can adequately serve you.     If you need a refill on a narcotic prescription or other medication, please call before your infusion appointment.           June 2021 Sunday Monday Tuesday Wednesday Thursday Friday Saturday             1    ECHO COMPLETE   8:30 AM   (60 min.)   [email or web address]   [facility]    LAB CENTRAL   9:30 AM   (15 min.)   [facility] LAB DRAW   [facility]    ONC INFUSION 1 HR (60 MIN)  10:00 AM   (60 min.)    ONC INFUSION NURSE   [facility] 2     3     4     5       6     7     8     9     10     11     12       13     14     15     16     17     18     19       20     21    CT CHEST/ABDOMEN/PELVIS W  10:10 AM   (20 min.)   [email or web address]   [facility] [place] [address]    VIDEO VISIT RETURN   8:15 AM   (30 min.)   Lisandro Aguiar MD   [facility]    LAB CENTRAL  12:30 PM   (15 min.)   [facility] LAB DRAW   [facility]    ONC INFUSION 1 HR (60 MIN)   1:00 PM   (60 min.)    ONC INFUSION NURSE   [facility] 23     24     25     26       27     28     29     30                                July 2021 Sunday Monday Tuesday Wednesday Thursday Friday Saturday                       1     2     3       4     5     6     7     8     9      10       11     12     13     14     15     16     17       18     19     20    RETURN  12:00 PM   (45 min.)   Tanya Marley PA-C   [facility]    ONC INFUSION 1 HR (60 MIN)   1:00 PM   (60 min.)    ONC INFUSION NURSE   [facility] 21 22     23     24       25     26     27     28     29     30     31                    Recent Results (from the past 24 hour(s))   CBC with platelets differential    Collection Time: 06/22/21 12:32 PM   Result Value Ref Range    WBC 6.5 4.0 - 11.0 10e9/L    RBC Count 4.20 3.8 - 5.2 10e12/L    Hemoglobin 12.5 11.7 - 15.7 g/dL    Hematocrit 39.0 35.0 - 47.0 %    MCV 93 78 - 100 fl    MCH 29.8 26.5 - 33.0 pg    MCHC 32.1 31.5 - 36.5 g/dL    RDW 13.9 10.0 - 15.0 %    Platelet Count 236 150 - 450 10e9/L    Diff Method Automated Method     % Neutrophils 47.8 %    % Lymphocytes 41.5 %    % Monocytes 6.5 %    % Eosinophils 3.4 %    % Basophils 0.5 %    % Immature Granulocytes 0.3 %    Nucleated RBCs 0 0 /100    Absolute Neutrophil 3.1 1.6 - 8.3 10e9/L    Absolute Lymphocytes 2.7 0.8 - 5.3 10e9/L    Absolute Monocytes 0.4 0.0 - 1.3 10e9/L    Absolute Eosinophils 0.2 0.0 - 0.7 10e9/L    Absolute Basophils 0.0 0.0 - 0.2 10e9/L    Abs Immature Granulocytes 0.0 0 - 0.4 10e9/L    Absolute Nucleated RBC 0.0    Comprehensive metabolic panel    Collection Time: 06/22/21 12:32 PM   Result Value Ref Range    Sodium 141 133 - 144 mmol/L    Potassium 4.0 3.4 - 5.3 mmol/L    Chloride 105 94 - 109 mmol/L    Carbon Dioxide 29 20 - 32 mmol/L    Anion Gap 7 3 - 14 mmol/L    Glucose 85 70 - 99 mg/dL    Urea Nitrogen 15 7 - 30 mg/dL    Creatinine 0.81 0.52 - 1.04 mg/dL    GFR Estimate 76 >60 mL/min/[1.73_m2]    GFR Estimate If Black 88 >60 mL/min/[1.73_m2]    Calcium 9.6 8.5 - 10.1 mg/dL    Bilirubin Total 0.3 0.2 - 1.3 mg/dL    Albumin 3.4 3.4 - 5.0 g/dL    Protein Total 8.0 6.8 - 8.8 g/dL    Alkaline Phosphatase 79 40 - 150 U/L    ALT 80 (H) 0  - 50 U/L    AST 48 (H) 0 - 45 U/L

## 2021-06-22 NOTE — NURSING NOTE
Chief Complaint   Patient presents with     Port Draw     Labs drawn via port by RN in lab. VS taken.      Port accessed with 20 gauge 3/4 inch gripper needle and labs drawn by rn.  Port flushed with NS and heparin.  Pt tolerated well.  VS taken.  Pt checked in for next appt.    Ashlyn Wilson RN

## 2021-06-22 NOTE — LETTER
"    6/22/2021         RE: Hoda Brush  [address]  [place] [address]        Dear Colleague,    Thank you for referring your patient, Hoda Brush, to the [facility] CANCER [facility]. Please see a copy of my visit note below.    Hoda is a 66 year old who is being evaluated via a billable video visit.      How would you like to obtain your AVS? MyChart  If the video visit is dropped, the invitation should be resent by: Text to cell phone: [phone]  Will anyone else be joining your video visit? No     Vitals - Patient Reported  Weight (Patient Reported): 80.7 kg (178 lb)  Height (Patient Reported): 157.5 cm (5' 2\")  BMI (Based on Pt Reported Ht/Wt): 32.56  Pain Score: No Pain (0)    Marycarmen Martins CMA June 22, 2021  8:22 AM         Video Start Time: 9:10  Video-Visit Details    Type of service:  Video Visit    Video End Time: 9:38    Originating Location (pt. Location): Home    Distant Location (provider location):  [facility]     Platform used for Video Visit: Nidia Drummond is a patient from [facility] and is seen here for continuation of care for her metastatic ER+HER2- breast cancer.  She is a [employer] refugee from [facility] and was initially seen in clinic with her daughter.  The only data we have from [facility] is an English translation of her records.       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  On 02/11/2014, she was seen by an oncologist.  The clinical staging of T4b N2 M1 was noted.   Her breast cancer was on the left side. There was no right breast cancer, confirmed by the patient and her daughter, correcting a possible error in the translated records.  ER was positive in 100% of the cells, AL at 14% and HER2 was 3+ positive.  It appears that this histopathologic information is on the mastectomy specimen. She " underwent an MRI for staging of presumptive bone metastases which was performed 03/02/2014.  There were skeletal metastases in the thoracic vertebrae at 12, L1, L3, L5 and S1 vertebral body, ranging in size from 0.7-3.0 cm in size.  Ischial and right femur were also involved, as well as a large iliac mass measuring about 15 cm in the uterus. 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   Radiation Oncology consultation was performed 03/11/2014, and she was given radiation in 1 dose of 6 Gy to the right iliac lesion.        With the initial diagnosis, she initiated treatment with 6 cycles of CAF neoadjuvant chemotherapy 02/14, 07/07, 03/28, 04/18/14, 05/08 and 05/29/14. She also received monthly zoledronic acid.  She then underwent a modified left mastectomy of Palacios type and left lymphadenoectomy on 06/27/2014.   Pathologic examination showed number at [facility] was [id number] showed infiltrative grade 2 ductal cancer with 6 level 1 metastatic lympFollow up with [facility] for visits and trastuzumab on 2-5, 2-26, 3-19 with CBC, CMP.  Echocardiogram on 3-19.  Follow up with me 4-9 with CBC, CMP, CA27.29 and CEA and with CT CAP on 4-8.h nodes and 3 level 2 metastatic lymph nodes.  The differentiation was intermediate.  The tumor was ER positive 70% of the cells, OK positive in 40% of the cells and HER2 was 3+ by immunohistochemistry and the Ki-67 labeling index was 20%. Her staging after surgery was stage IV, pT4b N2 M1.  I don't see a biopsy of the skeletal metastases.  She then had continuation with chemotherapy with 4 cycles of Herceptin and taxane with monthly zoledronic acid.  Tamoxifen was initiated.  She then had two years of Herceptin and a decision was made not to continue further Herceptin.  She continued on zoledronic acid every 3 months. She was clinically stable. [place] then [place] the [place] new [place] from [facility].  She has now been changed to letrozole.  Denosumab has now been held for new diagnosis of osteonecrosis of the  R maxilla.     History of cholecystectomy 2020.      TREATMENT HISTORY:  A. Initial diagnosis with metastatic breast cancer in [facility].  Neoadjuvant CAF x 6.   B. Left mastectomy. Left axillary node dissection.  C.  Radiation in 1 dose to R iliac region.  C. Herceptin for 2 years taxane for a prescribed course then stopped, monthly zoledronic acid.  She had 2 years of Herceptin with tamoxifen added after chemotherapy.  D.  Tamoxifen alone and zoledronic acid every 3 months starting 2014.  Letrozole was started   E.  [place]  We restarted Herceptin every 3 weeks and continued tamoxifen. Bone targeted agent changed to denosumab every 9 weeks. Zometa discontinued 2017.  Tamoxifen was changed to letrozole August 2019.    F.  Xgeva discontinued 12-17-19 because of dental issues.   G.  J+J vaccine March, 2021.  H.  Was on Zometa once May 11.  Reaction with eye lid swelling. Discontinued Zometal  H.  Restart Xgeva 6-22-21.  Continuing the trastuzumab and letrozole.  Both tolerated well.       INTERVAL HISTORY  She has been doing generally well.  She has some low back pain which is chronic.  No fatigue, no depression, no diarrhea.  Her dental problems were resolved about 6 months ago and she can restart Xgeva every 3 months.  Protruding bone was removed. No joint stiffness with letrozole.      She had the J+J vaccine.      REVIEW OF SYSTEMS:  She denies fevers or chills, cough, chest pain, shortness of breath, nausea or vomiting, constipation, diarrhea, bone pain, or headache.  She does have a chronic low back pain.  A 10 point review of systems is negative.     PHYSICAL EXAM:    Hoda appeared generally well.  No alopecia.  Mood and affect normal.      Labs  CMP was normal.  CBC normal.        Imaging   CT CAP 6-21-21  IMPRESSION:  1.  No significant change in the sclerotic osseous metastasis.  2.  Stable pulmonary nodules measuring up to 6 mm.  3.  No new metastatic disease.     MARYANNE FUNEZ MD    [facility] and  regional left ventricular function is normal with an EF of 55-60%.     ASSESSMENT AND PLAN:     1.  Hoda Brush is a 66-year-old woman with a history of ER-positive, WI-positive, HER2 positive breast cancer.  She is from [facility], formally from [facility], and came to live in the [place] with [place].  She is here for continuation of every 3-week Herceptin, which she has bee no.  N receiving along with tamoxifen daily.  The tumor is ER positive, WI positive and HER2 positive.  She had metastatic disease at the time of presentation with bone-only metastases by report.  She underwent neoadjuvant CAF, had a left mastectomy, left axillary lymph node dissection, radiation to the right hip, which included the right iliac region where she had metastatic disease.  She was initially on tamoxifen but then was switched to letrozole.  She continues on this and every 3-week trastuzumab.  She has had no evidence of disease progression for the last 3 years.  We have continued Herceptin every 3 weeks as well as daily letrozole. CT CAP yesterday showed stable disease.    2.  Restaging. We reviewed imaging with her and she is pleased that there is no evidence of progression of her metastatic breast cancer. --Continue to tolerate treatment well. - Tumor markers  have been stable.    3.  Mild transaminase elevations.  Restaging showed no evidence of liver metastases.  These LFT elevations may be due to fatty liver.  4.  Continue the letrozole.   5.  Echo. Stable EF. --Echo in  showed normal EF without change 60 to 65%. 2-16-21.  6.  Discussion of bone health and right maxillary osteonecrosis.  She will continue with calcium and vitamin D.  Restart Xgeva in 3 weeks for osteoporosis because right mandible problem was treated by her dentist and has resolved.   7. Follow up.     Trastuzumab and Xgeva today.  Trastuzumab July 20 with CBC and CMP and KARISSA visit.  She is taking a vacation between now and then.  Trastuzumab August  10, August 31 September 21, October 12 CBC and CMP all visits.  CT CAP October 11 with CBC and CMP.  Echocardiogram August 31.  Xgeva every other visit, next July 20.  Visit with me August 31 and October 12.        Thank you for allowing us to participate in this patient's care.      Sincerely,      Lisandro Aguiar MD  Professor  [facility]  [phone].       Patient was interviewed and discussed with Dr. Aguiar     ADDENDUM:  4-18-21 Xgeva removed because of insurance issues.  We will consider Zometa once we have a letter from her dentist saying that it is OK.      910 2 9:38 AM  I spent 28 minutes with the patient more than 50% of which was in counseling and coordination of care.       Again, thank you for allowing me to participate in the care of your patient.        Sincerely,        Lisandro Aguiar MD

## 2021-06-22 NOTE — PROGRESS NOTES
Infusion Nursing Note:  Hoda Brush presents today for cycle 66, day 1 herceptin, xgeva.    Patient seen by provider today: Yes: Dr Aguiar   present during visit today: Yes, Language: Prydeinig.     Note:   6/22/2021 1042 SANDRAB Lisandro Aguiar MD/Ashlyn Banda RN  -patient should get xgeva (received zometa on 5/11)      Intravenous Access:  Implanted Port.    Treatment Conditions:  Lab Results   Component Value Date    HGB 12.5 06/22/2021     Lab Results   Component Value Date    WBC 6.5 06/22/2021      Lab Results   Component Value Date    ANEU 3.1 06/22/2021     Lab Results   Component Value Date     06/22/2021      Lab Results   Component Value Date     06/22/2021                   Lab Results   Component Value Date    POTASSIUM 4.0 06/22/2021           No results found for: MAG         Lab Results   Component Value Date    CR 0.81 06/22/2021                   Lab Results   Component Value Date    TAYLER 9.6 06/22/2021                Lab Results   Component Value Date    BILITOTAL 0.3 06/22/2021           Lab Results   Component Value Date    ALBUMIN 3.4 06/22/2021                    Lab Results   Component Value Date    ALT 80 06/22/2021           Lab Results   Component Value Date    AST 48 06/22/2021       Results reviewed, labs MET treatment parameters, ok to proceed with treatment.  ECHO/MUGA completed 6/1/2021  EF 55-60%.      Post Infusion Assessment:  Patient tolerated infusion without incident.  Patient tolerated injection without incident. Xgeva given subcutaneously into right arm  Blood return noted pre and post infusion.  Site patent and intact, free from redness, edema or discomfort.  No evidence of extravasations.  Access discontinued per protocol.       Discharge Plan:   Patient declined prescription refills.  Discharge instructions reviewed with: Patient.  Patient and/or family verbalized understanding of discharge instructions and all questions answered.  AVS to patient via  TRU.  Patient will return 7/20/2021 for next appointment.   Patient discharged in stable condition accompanied by: self.  Departure Mode: Ambulatory.  Face to Face time: 0.      Ashlyn Banda RN

## 2021-07-19 NOTE — PROGRESS NOTES
[facility]  Date of Visit: Jul 20, 2021   Oncologist: Dr. Lisandro Aguiar    REASON FOR VISIT: mBC follow up    Hoda is a patient from [facility] and is seen here for continuation of care for her metastatic ER+HER2- breast cancer.  She requires a Uzbek .       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  On 02/11/2014, she was seen by an oncologist.  The clinical staging of T4b N2 M1 was noted.   Her breast cancer was on the left side. There was no right breast cancer, confirmed by the patient and her daughter, correcting a possible error in the translated records.  ER was positive in 100% of the cells, AL at 14% and HER2 was 3+ positive.  It appears that this histopathologic information is on the mastectomy specimen. She underwent an MRI for staging of presumptive bone metastases which was performed 03/02/2014.  There were skeletal metastases in the thoracic vertebrae at 12, L1, L3, L5 and S1 vertebral body, ranging in size from 0.7-3.0 cm in size.  Ischial and right femur were also involved, as well as a large iliac mass measuring about 15 cm in the uterus. 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  Radiation Oncology consultation was performed 03/11/2014, and she was given radiation in 1 dose of 6 Gy to the right iliac lesion.        With the initial diagnosis, she initiated treatment with 6 cycles of CAF neoadjuvant chemotherapy 02/14, 07/07, 03/28, 04/18/14, 05/08 and 05/29/14. She also received monthly zoledronic acid.  She then underwent a modified left mastectomy of Palacios type and left lymphadenoectomy on 06/27/2014.   Pathologic examination showed number at [facility] was [id number] showed infiltrative grade 2 ductal cancer with 6 level 1 metastatic lympFollow up with Jossie for visits and trastuzumab on 2-5, 2-26, 3-19 with CBC, CMP.  Echocardiogram on 3-19.  Follow up with  me 4-9 with CBC, CMP, CA27.29 and CEA and with CT CAP on 4-8.h nodes and 3 level 2 metastatic lymph nodes.  The differentiation was intermediate.  The tumor was ER positive 70% of the cells, ND positive in 40% of the cells and HER2 was 3+ by immunohistochemistry and the Ki-67 labeling index was 20%. Her staging after surgery was stage IV, pT4b N2 M1.  I don't see a biopsy of the skeletal metastases.  She then had continuation with chemotherapy with 4 cycles of Herceptin and taxane with monthly zoledronic acid.  Tamoxifen was initiated.  She then had two years of Herceptin and a decision was made not to continue further Herceptin.  She continued on zoledronic acid every 3 months. She was clinically stable. [place] new [place] from [facility].      [place] was [place] to letrozole and continues on Herceptin q3w.  Denosumab was held due to diagnosis of osteonecrosis of the R maxilla. 4/27/21 letter from dentist stated all her dental issues were resolved. She got Zometa on 5/11/21 but had reaction with eye lid swelling so this was discontinued and she restarted on Xgeva on 6/22/21.      TREATMENT HISTORY:  A. Initial diagnosis with metastatic breast cancer in [facility].  Neoadjuvant CAF x 6.   B. Left mastectomy. Left axillary node dissection.  C.  Radiation in 1 dose to R iliac region.  C. Herceptin for 2 years taxane for a prescribed course then stopped, monthly zoledronic acid.  She had 2 years of Herceptin with tamoxifen added after chemotherapy.  D.  Tamoxifen alone and zoledronic acid every 3 months.  E[place]  [place]  We restarted Herceptin every 3 weeks and continued switched to letrozole. Bone targeted agent changed to denosumab every 9 weeks.  F.  Xgeva discontinued 12-17-19 because of dental issues.   G.  J+J vaccine March, 2021.  H.  Started Zometa due to insurance. Received one dose on May 11, 2021.  Reaction with eye lid swelling. Discontinued Zometa.   I.   Restarted Xgeva 6-22-21.        INTERVAL HISTORY:  Hoda returns for oncology follow-up today. She is feeling pretty good. Energy level is good. Appetite is normal. Moving bowels. She stays pretty active. Walks daily and uses treadmill at home. Stable chronic low back pain.     Denies fevers, chills, headaches, CP, SOB, abd pain, nausea/vomiting, constipation/diarrhea, urinary issues, neuropathy, rash, MSK pain, hot flashes.      PHYSICAL EXAM:  /69   Pulse 75   Temp 98.1  F (36.7  C) (Oral)   Resp 16   Wt 82.2 kg (181 lb 3.2 oz)   SpO2 95%   BMI 32.11 kg/m     General: well appearing, pleasant female, no acute distress  HEENT: normocephalic, atraumatic, PERRLA, sclerae nonicteric, moist mucous membranes, oropharynx is clear   Lymph: no palpable cervical, supraclavicular or axillary lymphadenopathy   CV: regular rate and rhythm, no murmurs  Lungs: clear to auscultation bilaterally, no wheezes/rales/rhonchi  Abd: soft, positive bowel sounds, non-distended, non-tender  MSK: full range of motion in all four extremities, no peripheral edema  Neuro: alert and oriented x3, CN grossly intact   Psych: appropriate mood and affect  Skin: no rashes or lesions    Labs: Labs reviewed today.   7/20/2021 11:46   Sodium 138   Potassium 4.1   Chloride 109   Carbon Dioxide 30   Urea Nitrogen 15   Creatinine 0.77   GFR Estimate 81   Calcium 8.8   Anion Gap <1 (L)   Albumin 3.3 (L)   Protein Total 7.7   Bilirubin Total 0.3   Alkaline Phosphatase 63   ALT 64 (H)   AST 42   Glucose 93   WBC 6.7   Hemoglobin 11.9   Hematocrit 39.4   Platelet Count 213   RBC Count 4.14   MCV 95   MCH 28.7   MCHC 30.2 (L)   RDW 14.1   % Neutrophils 50   % Lymphocytes 39   % Monocytes 8   % Eosinophils 3   Absolute Basophils 0.0   % Basophils 0   Absolute Eosinophils 0.2   Absolute Immature Granulocytes 0.0   Absolute Lymphocytes 2.6   Absolute Monocytes 0.5   % Immature Granulocytes 0   Absolute Neutrophils 3.3   Absolute NRBCs 0.0   NRBCs per  100 WBC 0        ASSESSMENT AND PLAN:     1.  Hoda Brush is a 66 year old woman with a history of ER-positive, IN-positive, HER2 positive breast cancer.  She is from [facility], formally from [facility], and came to live in the [place] with [place].  She is here for continuation of every 3-week Herceptin, and daily letrozole.  The tumor is ER positive, IN positive and HER2 positive.  She had metastatic disease at the time of presentation with bone-only metastases by report.  She underwent neoadjuvant CAF, had a left mastectomy, left axillary lymph node dissection, radiation to the right hip, which included the right iliac region where she had metastatic disease.  She was initially on tamoxifen but then was switched to letrozole.  She continues on this and every 3-week trastuzumab.  She has had no evidence of disease progression for the last 3 years.  We have continued Herceptin every 3 weeks as well as daily letrozole. CT CAP done 6/21/21 shows stable disease. Next scan scheduled on 10/11/21.   -Hoda continues to feel well overall. Will proceed with Herceptin today.   -RTC in 3 weeks for next Herceptin infusion     2.  Mild transaminase elevations.  Restaging showed no evidence of liver metastases.  There is fatty infiltration noted on CT. ALT trending down, mildly elevated today and ALT is WNL.     3.   Echo. Stable EF. Most recent ECHO done 6/1/21 showed global and regional left ventricular function is normal with an EF of 55-60%, no significant change from prior exam. Next ECHO scheduled on 8/30/21.    4.  Discussion of bone health and right maxillary osteonecrosis.  Right mandible problem was treated by her dentist and has resolved. We received letter stating we are cleared to restart her Xgeva. However, insurance required switch to Zometa. She received 1 dose on 5/11/21 and had reaction, so this has been discontinued. She received Xgeva on 6/22/21. Will give next Xgeva injection today and then  plan to do this every other visit as per Dr. Aguiar.   -Continue oral calcium and vitamin D    45 minutes spent on the date of the encounter doing chart review, review of test results, interpretation of tests, patient visit and documentation     Patient seen and plan of care discussed with Tanya Marley PA-C.   JUSTINE Islas PA-C  [facility] [address] [id number] [phone]    The patient was seen in conjunction with Do Reeves PA-C who served as a scribe for today's visit. I have reviewed and edited the above note, and agree with the above findings and plan.

## 2021-07-20 ENCOUNTER — ONCOLOGY VISIT (OUTPATIENT)
Dept: ONCOLOGY | Facility: CLINIC | Age: 66
End: 2021-07-20
Attending: INTERNAL MEDICINE
Payer: MEDICAID

## 2021-07-20 ENCOUNTER — APPOINTMENT (OUTPATIENT)
Dept: LAB | Facility: CLINIC | Age: 66
End: 2021-07-20
Attending: OBSTETRICS & GYNECOLOGY
Payer: MEDICAID

## 2021-07-20 VITALS
TEMPERATURE: 98.1 F | DIASTOLIC BLOOD PRESSURE: 69 MMHG | SYSTOLIC BLOOD PRESSURE: 109 MMHG | BODY MASS INDEX: 32.11 KG/M2 | HEART RATE: 75 BPM | WEIGHT: 181.2 LBS | RESPIRATION RATE: 16 BRPM | OXYGEN SATURATION: 95 %

## 2021-07-20 DIAGNOSIS — C79.51 BONE METASTASIS: ICD-10-CM

## 2021-07-20 DIAGNOSIS — C50.919 METASTATIC BREAST CANCER: Primary | ICD-10-CM

## 2021-07-20 DIAGNOSIS — C50.912 MALIGNANT NEOPLASM OF LEFT FEMALE BREAST, UNSPECIFIED ESTROGEN RECEPTOR STATUS, UNSPECIFIED SITE OF BREAST (H): Primary | ICD-10-CM

## 2021-07-20 LAB
ALBUMIN SERPL-MCNC: 3.3 G/DL (ref 3.4–5)
ALP SERPL-CCNC: 63 U/L (ref 40–150)
ALT SERPL W P-5'-P-CCNC: 64 U/L (ref 0–50)
ANION GAP SERPL CALCULATED.3IONS-SCNC: <1 MMOL/L (ref 3–14)
AST SERPL W P-5'-P-CCNC: 42 U/L (ref 0–45)
BASOPHILS # BLD AUTO: 0 10E3/UL (ref 0–0.2)
BASOPHILS NFR BLD AUTO: 0 %
BILIRUB SERPL-MCNC: 0.3 MG/DL (ref 0.2–1.3)
BUN SERPL-MCNC: 15 MG/DL (ref 7–30)
CALCIUM SERPL-MCNC: 8.8 MG/DL (ref 8.5–10.1)
CANCER AG27-29 SERPL-ACNC: 9 U/ML (ref 0–39)
CEA SERPL-MCNC: 0.7 UG/L (ref 0–2.5)
CHLORIDE BLD-SCNC: 109 MMOL/L (ref 94–109)
CO2 SERPL-SCNC: 30 MMOL/L (ref 20–32)
CREAT SERPL-MCNC: 0.77 MG/DL (ref 0.52–1.04)
EOSINOPHIL # BLD AUTO: 0.2 10E3/UL (ref 0–0.7)
EOSINOPHIL NFR BLD AUTO: 3 %
ERYTHROCYTE [DISTWIDTH] IN BLOOD BY AUTOMATED COUNT: 14.1 % (ref 10–15)
GFR SERPL CREATININE-BSD FRML MDRD: 81 ML/MIN/1.73M2
GLUCOSE BLD-MCNC: 93 MG/DL (ref 70–99)
HCT VFR BLD AUTO: 39.4 % (ref 35–47)
HGB BLD-MCNC: 11.9 G/DL (ref 11.7–15.7)
IMM GRANULOCYTES # BLD: 0 10E3/UL
IMM GRANULOCYTES NFR BLD: 0 %
LYMPHOCYTES # BLD AUTO: 2.6 10E3/UL (ref 0.8–5.3)
LYMPHOCYTES NFR BLD AUTO: 39 %
MCH RBC QN AUTO: 28.7 PG (ref 26.5–33)
MCHC RBC AUTO-ENTMCNC: 30.2 G/DL (ref 31.5–36.5)
MCV RBC AUTO: 95 FL (ref 78–100)
MONOCYTES # BLD AUTO: 0.5 10E3/UL (ref 0–1.3)
MONOCYTES NFR BLD AUTO: 8 %
NEUTROPHILS # BLD AUTO: 3.3 10E3/UL (ref 1.6–8.3)
NEUTROPHILS NFR BLD AUTO: 50 %
NRBC # BLD AUTO: 0 10E3/UL
NRBC BLD AUTO-RTO: 0 /100
PLATELET # BLD AUTO: 213 10E3/UL (ref 150–450)
POTASSIUM BLD-SCNC: 4.1 MMOL/L (ref 3.4–5.3)
PROT SERPL-MCNC: 7.7 G/DL (ref 6.8–8.8)
RBC # BLD AUTO: 4.14 10E6/UL (ref 3.8–5.2)
SODIUM SERPL-SCNC: 138 MMOL/L (ref 133–144)
WBC # BLD AUTO: 6.7 10E3/UL (ref 4–11)

## 2021-07-20 PROCEDURE — 99215 OFFICE O/P EST HI 40 MIN: CPT | Performed by: PHYSICIAN ASSISTANT

## 2021-07-20 PROCEDURE — 250N000011 HC RX IP 250 OP 636: Performed by: INTERNAL MEDICINE

## 2021-07-20 PROCEDURE — 86300 IMMUNOASSAY TUMOR CA 15-3: CPT | Performed by: PHYSICIAN ASSISTANT

## 2021-07-20 PROCEDURE — 82378 CARCINOEMBRYONIC ANTIGEN: CPT | Performed by: PHYSICIAN ASSISTANT

## 2021-07-20 PROCEDURE — 258N000003 HC RX IP 258 OP 636: Performed by: PHYSICIAN ASSISTANT

## 2021-07-20 PROCEDURE — 250N000011 HC RX IP 250 OP 636: Performed by: PHYSICIAN ASSISTANT

## 2021-07-20 PROCEDURE — 85004 AUTOMATED DIFF WBC COUNT: CPT | Performed by: PHYSICIAN ASSISTANT

## 2021-07-20 PROCEDURE — 96372 THER/PROPH/DIAG INJ SC/IM: CPT | Performed by: INTERNAL MEDICINE

## 2021-07-20 PROCEDURE — 96413 CHEMO IV INFUSION 1 HR: CPT

## 2021-07-20 PROCEDURE — 82040 ASSAY OF SERUM ALBUMIN: CPT | Performed by: PHYSICIAN ASSISTANT

## 2021-07-20 PROCEDURE — 96372 THER/PROPH/DIAG INJ SC/IM: CPT | Mod: 59 | Performed by: INTERNAL MEDICINE

## 2021-07-20 RX ORDER — HEPARIN SODIUM (PORCINE) LOCK FLUSH IV SOLN 100 UNIT/ML 100 UNIT/ML
5 SOLUTION INTRAVENOUS EVERY 8 HOURS
Status: DISCONTINUED | OUTPATIENT
Start: 2021-07-20 | End: 2021-07-20 | Stop reason: HOSPADM

## 2021-07-20 RX ORDER — HEPARIN SODIUM (PORCINE) LOCK FLUSH IV SOLN 100 UNIT/ML 100 UNIT/ML
5 SOLUTION INTRAVENOUS ONCE
Status: COMPLETED | OUTPATIENT
Start: 2021-07-20 | End: 2021-07-20

## 2021-07-20 RX ADMIN — SODIUM CHLORIDE 250 ML: 9 INJECTION, SOLUTION INTRAVENOUS at 13:14

## 2021-07-20 RX ADMIN — Medication 5 ML: at 11:40

## 2021-07-20 RX ADMIN — Medication 5 ML: at 13:46

## 2021-07-20 RX ADMIN — DENOSUMAB 120 MG: 120 INJECTION SUBCUTANEOUS at 13:27

## 2021-07-20 RX ADMIN — TRASTUZUMAB 450 MG: 150 INJECTION, POWDER, LYOPHILIZED, FOR SOLUTION INTRAVENOUS at 13:14

## 2021-07-20 ASSESSMENT — PAIN SCALES - GENERAL: PAINLEVEL: NO PAIN (0)

## 2021-07-20 NOTE — NURSING NOTE
Chief Complaint   Patient presents with     Port Draw     Labs drawn via port by RN in lab. Vs taken.      Port accessed with 20g gripper needle by RN, labs collected, line flushed with saline and heparin.  Vitals taken. Pt checked in for appointment(s).    Jeanna SHERIFF RN PHN BSN  BMT/Oncology Lab

## 2021-07-20 NOTE — NURSING NOTE
"Oncology Rooming Note    July 20, 2021 12:30 PM   Hoda Brush is a 66 year old female who presents for:    Chief Complaint   Patient presents with     Port Draw     Labs drawn via port by RN in lab. Vs taken.      Oncology Clinic Visit     Pt is here for a rtn for Breast Cancer     Initial Vitals: Blood Pressure 109/69   Pulse 75   Temperature 98.1  F (36.7  C) (Oral)   Respiration 16   Weight 82.2 kg (181 lb 3.2 oz)   Oxygen Saturation 95%   Body Mass Index 32.11 kg/m   Estimated body mass index is 32.11 kg/m  as calculated from the following:    Height as of 7/20/20: 1.6 m (5' 2.99\").    Weight as of this encounter: 82.2 kg (181 lb 3.2 oz). Body surface area is 1.91 meters squared.  No Pain (0) Comment: Data Unavailable   No LMP recorded. Patient is postmenopausal.  Allergies reviewed: Yes  Medications reviewed: Yes    Medications: Medication refills not needed today.  Pharmacy name entered into [facility]: [id number] - [place] - [address] AT  [address] [facility]    Clinical concerns: none       Alejandra Ibarra MA            "

## 2021-07-20 NOTE — PROGRESS NOTES
Infusion Nursing Note:  Hoda Brush presents today for Cycle 66 Herceptin, Xgeva.    Patient seen by provider today: Yes: SEMAJ Can    Intravenous Access:  Implanted Port.    Treatment Conditions:  Lab Results   Component Value Date    HGB 11.9 07/20/2021    HGB 12.5 06/22/2021     Lab Results   Component Value Date    WBC 6.7 07/20/2021    WBC 6.5 06/22/2021      Lab Results   Component Value Date    ANEU 3.1 06/22/2021     Lab Results   Component Value Date     07/20/2021     06/22/2021      Lab Results   Component Value Date     07/20/2021     06/22/2021                   Lab Results   Component Value Date    POTASSIUM 4.1 07/20/2021    POTASSIUM 4.0 06/22/2021           No results found for: MAG         Lab Results   Component Value Date    CR 0.77 07/20/2021    CR 0.81 06/22/2021                   Lab Results   Component Value Date    TAYLER 8.8 07/20/2021    TAYLER 9.6 06/22/2021                Lab Results   Component Value Date    BILITOTAL 0.3 07/20/2021    BILITOTAL 0.3 06/22/2021           Lab Results   Component Value Date    ALBUMIN 3.3 07/20/2021    ALBUMIN 3.4 06/22/2021                    Lab Results   Component Value Date    ALT 64 07/20/2021    ALT 80 06/22/2021           Lab Results   Component Value Date    AST 42 07/20/2021    AST 48 06/22/2021     Results reviewed, labs MET treatment parameters, ok to proceed with treatment.  ECHO/MUGA completed 6/1/21  EF 55-60%.    Post Infusion Assessment:  Patient tolerated infusion without incident.  Patient tolerated injection without incident.  Blood return noted pre and post infusion.  Site patent and intact, free from redness, edema or discomfort.  No evidence of extravasations. Access discontinued per protocol.     Discharge Plan:   Patient declined prescription refills.  AVS to patient via [employer].  Patient will return 8/10 for next appointment.   Patient discharged in stable condition accompanied by:  self.  Departure Mode: Ambulatory.      Ashlyn Cano RN

## 2021-07-20 NOTE — LETTER
7/20/2021         RE: Hoda Herreraoshynskaya  [address] [address]      [facility]  Date of Visit: Jul 20, 2021   Oncologist: Dr. Lisandro Aguiar    REASON FOR VISIT: mBC follow up    Hoda is a patient from [facility] and is seen here for continuation of care for her metastatic ER+HER2- breast cancer.  She requires a Indian .       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  On 02/11/2014, she was seen by an oncologist.  The clinical staging of T4b N2 M1 was noted.   Her breast cancer was on the left side. There was no right breast cancer, confirmed by the patient and her daughter, correcting a possible error in the translated records.  ER was positive in 100% of the cells, MO at 14% and HER2 was 3+ positive.  It appears that this histopathologic information is on the mastectomy specimen. She underwent an MRI for staging of presumptive bone metastases which was performed 03/02/2014.  There were skeletal metastases in the thoracic vertebrae at 12, L1, L3, L5 and S1 vertebral body, ranging in size from 0.7-3.0 cm in size.  Ischial and right femur were also involved, as well as a large iliac mass measuring about 15 cm in the uterus. 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  Radiation Oncology consultation was performed 03/11/2014, and she was given radiation in 1 dose of 6 Gy to the right iliac lesion.        With the initial diagnosis, she initiated treatment with 6 cycles of CAF neoadjuvant chemotherapy 02/14, 07/07, 03/28, 04/18/14, 05/08 and 05/29/14. She also received monthly zoledronic acid.  She then underwent a modified left mastectomy of Palacios type and left lymphadenoectomy on 06/27/2014.   Pathologic examination showed number at [facility] was [id number] showed infiltrative grade 2 ductal cancer with 6 level 1 metastatic lympFollow up with  Jossie for visits and trastuzumab on 2-5, 2-26, 3-19 with CBC, CMP.  Echocardiogram on 3-19.  Follow up with me 4-9 with CBC, CMP, CA27.29 and CEA and with CT CAP on 4-8.h nodes and 3 level 2 metastatic lymph nodes.  The differentiation was intermediate.  The tumor was ER positive 70% of the cells, TX positive in 40% of the cells and HER2 was 3+ by immunohistochemistry and the Ki-67 labeling index was 20%. Her staging after surgery was stage IV, pT4b N2 M1.  I don't see a biopsy of the skeletal metastases.  She then had continuation with chemotherapy with 4 cycles of Herceptin and taxane with monthly zoledronic acid.  Tamoxifen was initiated.  She then had two years of Herceptin and a decision was made not to continue further Herceptin.  She continued on zoledronic acid every 3 months. She was clinically stable. [place] the [place] new [place] from [facility].      [place] she was switched to letrozole and continues on Herceptin q3w.  Denosumab was held due to diagnosis of osteonecrosis of the R maxilla. 4/27/21 letter from dentist stated all her dental issues were resolved. She got Zometa on 5/11/21 but had reaction with eye lid swelling so this was discontinued and she restarted on Xgeva on 6/22/21.      TREATMENT HISTORY:  A. Initial diagnosis with metastatic breast cancer in [facility].  Neoadjuvant CAF x 6.   B. Left mastectomy. Left axillary node dissection.  C.  Radiation in 1 dose to R iliac region.  C. Herceptin for 2 years taxane for a prescribed course then stopped, monthly zoledronic acid.  She had 2 years of Herceptin with tamoxifen added after chemotherapy.  D.  Tamoxifen alone and zoledronic acid every 3 months.  E.  [place]  We restarted Herceptin every 3 weeks and continued switched to letrozole. Bone targeted agent changed to denosumab every 9 weeks.  F.  Xgeva discontinued 12-17-19 because of dental issues.   G.  J+J vaccine March, 2021.  H.  Started Zometa due to  insurance. Received one dose on May 11, 2021.  Reaction with eye lid swelling. Discontinued Zometa.   I.  Restarted Xgeva 6-22-21.        INTERVAL HISTORY:  Hoda returns for oncology follow-up today. She is feeling pretty good. Energy level is good. Appetite is normal. Moving bowels. She stays pretty active. Walks daily and uses treadmill at home. Stable chronic low back pain.     Denies fevers, chills, headaches, CP, SOB, abd pain, nausea/vomiting, constipation/diarrhea, urinary issues, neuropathy, rash, MSK pain, hot flashes.      PHYSICAL EXAM:  /69   Pulse 75   Temp 98.1  F (36.7  C) (Oral)   Resp 16   Wt 82.2 kg (181 lb 3.2 oz)   SpO2 95%   BMI 32.11 kg/m     General: well appearing, pleasant female, no acute distress  HEENT: normocephalic, atraumatic, PERRLA, sclerae nonicteric, moist mucous membranes, oropharynx is clear   Lymph: no palpable cervical, supraclavicular or axillary lymphadenopathy   CV: regular rate and rhythm, no murmurs  Lungs: clear to auscultation bilaterally, no wheezes/rales/rhonchi  Abd: soft, positive bowel sounds, non-distended, non-tender  MSK: full range of motion in all four extremities, no peripheral edema  Neuro: alert and oriented x3, CN grossly intact   Psych: appropriate mood and affect  Skin: no rashes or lesions    Labs: Labs reviewed today.   7/20/2021 11:46   Sodium 138   Potassium 4.1   Chloride 109   Carbon Dioxide 30   Urea Nitrogen 15   Creatinine 0.77   GFR Estimate 81   Calcium 8.8   Anion Gap <1 (L)   Albumin 3.3 (L)   Protein Total 7.7   Bilirubin Total 0.3   Alkaline Phosphatase 63   ALT 64 (H)   AST 42   Glucose 93   WBC 6.7   Hemoglobin 11.9   Hematocrit 39.4   Platelet Count 213   RBC Count 4.14   MCV 95   MCH 28.7   MCHC 30.2 (L)   RDW 14.1   % Neutrophils 50   % Lymphocytes 39   % Monocytes 8   % Eosinophils 3   Absolute Basophils 0.0   % Basophils 0   Absolute Eosinophils 0.2   Absolute Immature Granulocytes 0.0   Absolute Lymphocytes 2.6    Absolute Monocytes 0.5   % Immature Granulocytes 0   Absolute Neutrophils 3.3   Absolute NRBCs 0.0   NRBCs per 100 WBC 0        ASSESSMENT AND PLAN:     1.  Hoda Brush is a 66 year old woman with a history of ER-positive, NM-positive, HER2 positive breast cancer.  She is from [facility], formally from [facility], and came [place] live in the [place] with [place].  She is here for continuation of every 3-week Herceptin, and daily letrozole.  The tumor is ER positive, NM positive and HER2 positive.  She had metastatic disease at the time of presentation with bone-only metastases by report.  She underwent neoadjuvant CAF, had a left mastectomy, left axillary lymph node dissection, radiation to the right hip, which included the right iliac region where she had metastatic disease.  She was initially on tamoxifen but then was switched to letrozole.  She continues on this and every 3-week trastuzumab.  She has had no evidence of disease progression for the last 3 years.  We have continued Herceptin every 3 weeks as well as daily letrozole. CT CAP done 6/21/21 shows stable disease. Next scan scheduled on 10/11/21.   -Hoda continues to feel well overall. Will proceed with Herceptin today.   -RTC in 3 weeks for next Herceptin infusion     2.  Mild transaminase elevations.  Restaging showed no evidence of liver metastases.  There is fatty infiltration noted on CT. ALT trending down, mildly elevated today and ALT is WNL.     3.   Echo. Stable EF. Most recent ECHO done 6/1/21 showed global and regional left ventricular function is normal with an EF of 55-60%, no significant change from prior exam. Next ECHO scheduled on 8/30/21.    4.  Discussion of bone health and right maxillary osteonecrosis.  Right mandible problem was treated by her dentist and has resolved. We received letter stating we are cleared to restart her Xgeva. However, insurance required switch to Zometa. She received 1 dose on 5/11/21 and had  reaction, so this has been discontinued. She received Xgeva on 6/22/21. Will give next Xgeva injection today and then plan to do this every other visit as per Dr. Aguiar.   -Continue oral calcium and vitamin D    45 minutes spent on the date of the encounter doing chart review, review of test results, interpretation of tests, patient visit and documentation     Patient seen and plan of care discussed with Tanya Marley PA-C.   JSUTINE Islas PA-C  [facility] [place] [address]  [phone]    The patient was seen in conjunction with Do Reeves PA-C who served as a scribe for today's visit. I have reviewed and edited the above note, and agree with the above findings and plan.            Tanya Marley PA-C

## 2021-08-10 ENCOUNTER — APPOINTMENT (OUTPATIENT)
Dept: LAB | Facility: CLINIC | Age: 66
End: 2021-08-10
Attending: INTERNAL MEDICINE
Payer: COMMERCIAL

## 2021-08-10 ENCOUNTER — INFUSION THERAPY VISIT (OUTPATIENT)
Dept: ONCOLOGY | Facility: CLINIC | Age: 66
End: 2021-08-10
Attending: INTERNAL MEDICINE
Payer: COMMERCIAL

## 2021-08-10 VITALS
OXYGEN SATURATION: 96 % | HEART RATE: 76 BPM | SYSTOLIC BLOOD PRESSURE: 115 MMHG | RESPIRATION RATE: 16 BRPM | DIASTOLIC BLOOD PRESSURE: 73 MMHG | BODY MASS INDEX: 32.16 KG/M2 | WEIGHT: 181.5 LBS | TEMPERATURE: 97.9 F

## 2021-08-10 DIAGNOSIS — C79.51 BONE METASTASIS: ICD-10-CM

## 2021-08-10 DIAGNOSIS — C50.912 MALIGNANT NEOPLASM OF LEFT FEMALE BREAST, UNSPECIFIED ESTROGEN RECEPTOR STATUS, UNSPECIFIED SITE OF BREAST (H): Primary | ICD-10-CM

## 2021-08-10 LAB
ALBUMIN SERPL-MCNC: 3.2 G/DL (ref 3.4–5)
ALP SERPL-CCNC: 58 U/L (ref 40–150)
ALT SERPL W P-5'-P-CCNC: 71 U/L (ref 0–50)
ANION GAP SERPL CALCULATED.3IONS-SCNC: 5 MMOL/L (ref 3–14)
AST SERPL W P-5'-P-CCNC: 46 U/L (ref 0–45)
BASOPHILS # BLD AUTO: 0 10E3/UL (ref 0–0.2)
BASOPHILS NFR BLD AUTO: 0 %
BILIRUB SERPL-MCNC: 0.2 MG/DL (ref 0.2–1.3)
BUN SERPL-MCNC: 10 MG/DL (ref 7–30)
CALCIUM SERPL-MCNC: 8.8 MG/DL (ref 8.5–10.1)
CANCER AG27-29 SERPL-ACNC: 7 U/ML (ref 0–39)
CEA SERPL-MCNC: 0.9 UG/L (ref 0–2.5)
CHLORIDE BLD-SCNC: 108 MMOL/L (ref 94–109)
CO2 SERPL-SCNC: 29 MMOL/L (ref 20–32)
CREAT SERPL-MCNC: 0.75 MG/DL (ref 0.52–1.04)
EOSINOPHIL # BLD AUTO: 0.2 10E3/UL (ref 0–0.7)
EOSINOPHIL NFR BLD AUTO: 4 %
ERYTHROCYTE [DISTWIDTH] IN BLOOD BY AUTOMATED COUNT: 13.8 % (ref 10–15)
GFR SERPL CREATININE-BSD FRML MDRD: 84 ML/MIN/1.73M2
GLUCOSE BLD-MCNC: 108 MG/DL (ref 70–99)
HCT VFR BLD AUTO: 38.8 % (ref 35–47)
HGB BLD-MCNC: 12 G/DL (ref 11.7–15.7)
IMM GRANULOCYTES # BLD: 0 10E3/UL
IMM GRANULOCYTES NFR BLD: 0 %
LYMPHOCYTES # BLD AUTO: 2.6 10E3/UL (ref 0.8–5.3)
LYMPHOCYTES NFR BLD AUTO: 42 %
MCH RBC QN AUTO: 29.8 PG (ref 26.5–33)
MCHC RBC AUTO-ENTMCNC: 30.9 G/DL (ref 31.5–36.5)
MCV RBC AUTO: 96 FL (ref 78–100)
MONOCYTES # BLD AUTO: 0.5 10E3/UL (ref 0–1.3)
MONOCYTES NFR BLD AUTO: 8 %
NEUTROPHILS # BLD AUTO: 2.9 10E3/UL (ref 1.6–8.3)
NEUTROPHILS NFR BLD AUTO: 46 %
NRBC # BLD AUTO: 0 10E3/UL
NRBC BLD AUTO-RTO: 0 /100
PLATELET # BLD AUTO: 204 10E3/UL (ref 150–450)
POTASSIUM BLD-SCNC: 4 MMOL/L (ref 3.4–5.3)
PROT SERPL-MCNC: 7.4 G/DL (ref 6.8–8.8)
RBC # BLD AUTO: 4.03 10E6/UL (ref 3.8–5.2)
SODIUM SERPL-SCNC: 142 MMOL/L (ref 133–144)
WBC # BLD AUTO: 6.3 10E3/UL (ref 4–11)

## 2021-08-10 PROCEDURE — 86300 IMMUNOASSAY TUMOR CA 15-3: CPT | Performed by: INTERNAL MEDICINE

## 2021-08-10 PROCEDURE — 82040 ASSAY OF SERUM ALBUMIN: CPT | Performed by: INTERNAL MEDICINE

## 2021-08-10 PROCEDURE — 250N000011 HC RX IP 250 OP 636: Performed by: INTERNAL MEDICINE

## 2021-08-10 PROCEDURE — 96413 CHEMO IV INFUSION 1 HR: CPT | Performed by: INTERNAL MEDICINE

## 2021-08-10 PROCEDURE — 258N000003 HC RX IP 258 OP 636: Performed by: INTERNAL MEDICINE

## 2021-08-10 PROCEDURE — 85025 COMPLETE CBC W/AUTO DIFF WBC: CPT | Performed by: INTERNAL MEDICINE

## 2021-08-10 PROCEDURE — 36591 DRAW BLOOD OFF VENOUS DEVICE: CPT | Performed by: INTERNAL MEDICINE

## 2021-08-10 PROCEDURE — 82378 CARCINOEMBRYONIC ANTIGEN: CPT | Performed by: INTERNAL MEDICINE

## 2021-08-10 RX ORDER — EPINEPHRINE 0.3 MG/.3ML
0.3 INJECTION SUBCUTANEOUS EVERY 5 MIN PRN
Status: CANCELLED | OUTPATIENT
Start: 2021-08-31

## 2021-08-10 RX ORDER — METHYLPREDNISOLONE SODIUM SUCCINATE 125 MG/2ML
125 INJECTION, POWDER, LYOPHILIZED, FOR SOLUTION INTRAMUSCULAR; INTRAVENOUS
Status: CANCELLED
Start: 2021-08-31

## 2021-08-10 RX ORDER — ALBUTEROL SULFATE 90 UG/1
1-2 AEROSOL, METERED RESPIRATORY (INHALATION)
Status: CANCELLED
Start: 2021-09-21

## 2021-08-10 RX ORDER — DIPHENHYDRAMINE HCL 25 MG
50 CAPSULE ORAL ONCE
Status: CANCELLED
Start: 2021-08-10

## 2021-08-10 RX ORDER — DIPHENHYDRAMINE HCL 25 MG
50 CAPSULE ORAL ONCE
Status: CANCELLED
Start: 2021-08-31

## 2021-08-10 RX ORDER — HEPARIN SODIUM (PORCINE) LOCK FLUSH IV SOLN 100 UNIT/ML 100 UNIT/ML
5 SOLUTION INTRAVENOUS ONCE
Status: COMPLETED | OUTPATIENT
Start: 2021-08-10 | End: 2021-08-10

## 2021-08-10 RX ORDER — ALBUTEROL SULFATE 90 UG/1
1-2 AEROSOL, METERED RESPIRATORY (INHALATION)
Status: CANCELLED
Start: 2021-08-10

## 2021-08-10 RX ORDER — METHYLPREDNISOLONE SODIUM SUCCINATE 125 MG/2ML
125 INJECTION, POWDER, LYOPHILIZED, FOR SOLUTION INTRAMUSCULAR; INTRAVENOUS
Status: CANCELLED
Start: 2021-10-14

## 2021-08-10 RX ORDER — SODIUM CHLORIDE 9 MG/ML
1000 INJECTION, SOLUTION INTRAVENOUS CONTINUOUS PRN
Status: CANCELLED
Start: 2021-09-21

## 2021-08-10 RX ORDER — SODIUM CHLORIDE 9 MG/ML
1000 INJECTION, SOLUTION INTRAVENOUS CONTINUOUS PRN
Status: CANCELLED
Start: 2021-10-14

## 2021-08-10 RX ORDER — HEPARIN SODIUM (PORCINE) LOCK FLUSH IV SOLN 100 UNIT/ML 100 UNIT/ML
5 SOLUTION INTRAVENOUS EVERY 8 HOURS
Status: CANCELLED | OUTPATIENT
Start: 2021-09-21

## 2021-08-10 RX ORDER — EPINEPHRINE 0.3 MG/.3ML
0.3 INJECTION SUBCUTANEOUS EVERY 5 MIN PRN
Status: CANCELLED | OUTPATIENT
Start: 2021-08-10

## 2021-08-10 RX ORDER — ACETAMINOPHEN 325 MG/1
650 TABLET ORAL
Status: CANCELLED | OUTPATIENT
Start: 2021-09-21

## 2021-08-10 RX ORDER — DIPHENHYDRAMINE HYDROCHLORIDE 50 MG/ML
50 INJECTION INTRAMUSCULAR; INTRAVENOUS
Status: CANCELLED
Start: 2021-10-14

## 2021-08-10 RX ORDER — ALBUTEROL SULFATE 0.83 MG/ML
2.5 SOLUTION RESPIRATORY (INHALATION)
Status: CANCELLED | OUTPATIENT
Start: 2021-09-21

## 2021-08-10 RX ORDER — EPINEPHRINE 0.3 MG/.3ML
0.3 INJECTION SUBCUTANEOUS EVERY 5 MIN PRN
Status: CANCELLED | OUTPATIENT
Start: 2021-09-21

## 2021-08-10 RX ORDER — EPINEPHRINE 1 MG/ML
0.3 INJECTION, SOLUTION INTRAMUSCULAR; SUBCUTANEOUS EVERY 5 MIN PRN
Status: CANCELLED | OUTPATIENT
Start: 2021-09-21

## 2021-08-10 RX ORDER — LORAZEPAM 2 MG/ML
0.5 INJECTION INTRAMUSCULAR EVERY 4 HOURS PRN
Status: CANCELLED
Start: 2021-10-14

## 2021-08-10 RX ORDER — ALBUTEROL SULFATE 0.83 MG/ML
2.5 SOLUTION RESPIRATORY (INHALATION)
Status: CANCELLED | OUTPATIENT
Start: 2021-10-14

## 2021-08-10 RX ORDER — DIPHENHYDRAMINE HYDROCHLORIDE 50 MG/ML
50 INJECTION INTRAMUSCULAR; INTRAVENOUS
Status: CANCELLED
Start: 2021-08-10

## 2021-08-10 RX ORDER — ACETAMINOPHEN 325 MG/1
650 TABLET ORAL
Status: CANCELLED | OUTPATIENT
Start: 2021-08-31

## 2021-08-10 RX ORDER — MEPERIDINE HYDROCHLORIDE 25 MG/ML
25 INJECTION INTRAMUSCULAR; INTRAVENOUS; SUBCUTANEOUS EVERY 30 MIN PRN
Status: CANCELLED | OUTPATIENT
Start: 2021-09-21

## 2021-08-10 RX ORDER — DIPHENHYDRAMINE HCL 25 MG
50 CAPSULE ORAL ONCE
Status: CANCELLED
Start: 2021-10-14

## 2021-08-10 RX ORDER — EPINEPHRINE 1 MG/ML
0.3 INJECTION, SOLUTION INTRAMUSCULAR; SUBCUTANEOUS EVERY 5 MIN PRN
Status: CANCELLED | OUTPATIENT
Start: 2021-08-31

## 2021-08-10 RX ORDER — MEPERIDINE HYDROCHLORIDE 25 MG/ML
25 INJECTION INTRAMUSCULAR; INTRAVENOUS; SUBCUTANEOUS EVERY 30 MIN PRN
Status: CANCELLED | OUTPATIENT
Start: 2021-10-14

## 2021-08-10 RX ORDER — HEPARIN SODIUM (PORCINE) LOCK FLUSH IV SOLN 100 UNIT/ML 100 UNIT/ML
5 SOLUTION INTRAVENOUS EVERY 8 HOURS
Status: CANCELLED | OUTPATIENT
Start: 2021-10-14

## 2021-08-10 RX ORDER — SODIUM CHLORIDE 9 MG/ML
1000 INJECTION, SOLUTION INTRAVENOUS CONTINUOUS PRN
Status: CANCELLED
Start: 2021-08-31

## 2021-08-10 RX ORDER — HEPARIN SODIUM (PORCINE) LOCK FLUSH IV SOLN 100 UNIT/ML 100 UNIT/ML
5 SOLUTION INTRAVENOUS EVERY 8 HOURS
Status: DISCONTINUED | OUTPATIENT
Start: 2021-08-10 | End: 2021-08-10 | Stop reason: HOSPADM

## 2021-08-10 RX ORDER — SODIUM CHLORIDE 9 MG/ML
1000 INJECTION, SOLUTION INTRAVENOUS CONTINUOUS PRN
Status: CANCELLED
Start: 2021-08-10

## 2021-08-10 RX ORDER — HEPARIN SODIUM (PORCINE) LOCK FLUSH IV SOLN 100 UNIT/ML 100 UNIT/ML
5 SOLUTION INTRAVENOUS EVERY 8 HOURS
Status: CANCELLED | OUTPATIENT
Start: 2021-08-31

## 2021-08-10 RX ORDER — METHYLPREDNISOLONE SODIUM SUCCINATE 125 MG/2ML
125 INJECTION, POWDER, LYOPHILIZED, FOR SOLUTION INTRAMUSCULAR; INTRAVENOUS
Status: CANCELLED
Start: 2021-09-21

## 2021-08-10 RX ORDER — LORAZEPAM 2 MG/ML
0.5 INJECTION INTRAMUSCULAR EVERY 4 HOURS PRN
Status: CANCELLED
Start: 2021-08-10

## 2021-08-10 RX ORDER — DIPHENHYDRAMINE HCL 25 MG
50 CAPSULE ORAL ONCE
Status: CANCELLED
Start: 2021-09-21

## 2021-08-10 RX ORDER — LORAZEPAM 2 MG/ML
0.5 INJECTION INTRAMUSCULAR EVERY 4 HOURS PRN
Status: CANCELLED
Start: 2021-08-31

## 2021-08-10 RX ORDER — EPINEPHRINE 1 MG/ML
0.3 INJECTION, SOLUTION INTRAMUSCULAR; SUBCUTANEOUS EVERY 5 MIN PRN
Status: CANCELLED | OUTPATIENT
Start: 2021-10-14

## 2021-08-10 RX ORDER — DIPHENHYDRAMINE HYDROCHLORIDE 50 MG/ML
50 INJECTION INTRAMUSCULAR; INTRAVENOUS
Status: CANCELLED
Start: 2021-08-31

## 2021-08-10 RX ORDER — ACETAMINOPHEN 325 MG/1
650 TABLET ORAL
Status: CANCELLED | OUTPATIENT
Start: 2021-08-10

## 2021-08-10 RX ORDER — DIPHENHYDRAMINE HYDROCHLORIDE 50 MG/ML
50 INJECTION INTRAMUSCULAR; INTRAVENOUS
Status: CANCELLED
Start: 2021-09-21

## 2021-08-10 RX ORDER — ACETAMINOPHEN 325 MG/1
650 TABLET ORAL
Status: CANCELLED | OUTPATIENT
Start: 2021-10-14

## 2021-08-10 RX ORDER — MEPERIDINE HYDROCHLORIDE 25 MG/ML
25 INJECTION INTRAMUSCULAR; INTRAVENOUS; SUBCUTANEOUS EVERY 30 MIN PRN
Status: CANCELLED | OUTPATIENT
Start: 2021-08-31

## 2021-08-10 RX ORDER — ALBUTEROL SULFATE 0.83 MG/ML
2.5 SOLUTION RESPIRATORY (INHALATION)
Status: CANCELLED | OUTPATIENT
Start: 2021-08-31

## 2021-08-10 RX ORDER — LORAZEPAM 2 MG/ML
0.5 INJECTION INTRAMUSCULAR EVERY 4 HOURS PRN
Status: CANCELLED
Start: 2021-09-21

## 2021-08-10 RX ORDER — ALBUTEROL SULFATE 90 UG/1
1-2 AEROSOL, METERED RESPIRATORY (INHALATION)
Status: CANCELLED
Start: 2021-08-31

## 2021-08-10 RX ORDER — METHYLPREDNISOLONE SODIUM SUCCINATE 125 MG/2ML
125 INJECTION, POWDER, LYOPHILIZED, FOR SOLUTION INTRAMUSCULAR; INTRAVENOUS
Status: CANCELLED
Start: 2021-08-10

## 2021-08-10 RX ORDER — MEPERIDINE HYDROCHLORIDE 25 MG/ML
25 INJECTION INTRAMUSCULAR; INTRAVENOUS; SUBCUTANEOUS EVERY 30 MIN PRN
Status: CANCELLED | OUTPATIENT
Start: 2021-08-10

## 2021-08-10 RX ORDER — EPINEPHRINE 1 MG/ML
0.3 INJECTION, SOLUTION INTRAMUSCULAR; SUBCUTANEOUS EVERY 5 MIN PRN
Status: CANCELLED | OUTPATIENT
Start: 2021-08-10

## 2021-08-10 RX ORDER — EPINEPHRINE 0.3 MG/.3ML
0.3 INJECTION SUBCUTANEOUS EVERY 5 MIN PRN
Status: CANCELLED | OUTPATIENT
Start: 2021-10-14

## 2021-08-10 RX ORDER — ALBUTEROL SULFATE 0.83 MG/ML
2.5 SOLUTION RESPIRATORY (INHALATION)
Status: CANCELLED | OUTPATIENT
Start: 2021-08-10

## 2021-08-10 RX ORDER — ALBUTEROL SULFATE 90 UG/1
1-2 AEROSOL, METERED RESPIRATORY (INHALATION)
Status: CANCELLED
Start: 2021-10-14

## 2021-08-10 RX ADMIN — Medication 5 ML: at 14:13

## 2021-08-10 RX ADMIN — SODIUM CHLORIDE 250 ML: 9 INJECTION, SOLUTION INTRAVENOUS at 13:39

## 2021-08-10 RX ADMIN — TRASTUZUMAB 450 MG: 150 INJECTION, POWDER, LYOPHILIZED, FOR SOLUTION INTRAVENOUS at 13:39

## 2021-08-10 RX ADMIN — Medication 5 ML: at 12:37

## 2021-08-10 ASSESSMENT — PAIN SCALES - GENERAL: PAINLEVEL: NO PAIN (0)

## 2021-08-10 NOTE — PATIENT INSTRUCTIONS
Contact Numbers  [facility]: [phone]    After Hours:  [phone]  Triage: [phone]    Please call the [facility] Triage line if you experience a temperature greater than or equal to 100.5, shaking chills, have uncontrolled nausea, vomiting and/or diarrhea, dizziness, shortness of breath, chest pain, bleeding, unexplained bruising, or if you have any other new/concerning symptoms, questions or concerns.     If it is after hours, weekends, or holidays, please call the main hospital  at  [phone] and ask to speak to the Oncology doctor on call.     If you are having any concerning symptoms or wish to speak to a provider before your next infusion visit, please call your care coordinator or triage to notify them so we can adequately serve you.     If you need a refill on a narcotic prescription or other medication, please call triage before your infusion appointment.         August 2021 Sunday Monday Tuesday Wednesday Thursday Friday Saturday   1     2     3     4     5     6     7       8     9     10    LAB CENTRAL  12:30 PM   (15 min.)   [facility]   [facility]    ONC INFUSION 1 HR (60 MIN)   1:00 PM   (60 min.)    ONC INFUSION NURSE   [facility] 11     12     13     14       15     16     17     18     19     20     21       22     23     24     25     26     27     28       29     30    ECHO COMPLETE  10:15 AM   (60 min.)   ECHCR1   [facility] 31    VIDEO VISIT RETURN   8:45 AM   (30 min.)   Lisandro Aguiar MD   [facility]    LAB CENTRAL  12:30 PM   (15 min.)   [facility]   [facility]    ONC INFUSION 1 HR (60 MIN)   1:00 PM   (60 min.)    ONC INFUSION NURSE   [facility]                                 September 2021 Sunday Monday Tuesday Wednesday Thursday Friday Saturday                   1     2     3     4       5     6     7     8     9     10     11       12     13     14     15     16     17     18       19     20     21    LAB CENTRAL  12:30 PM   (15 min.)   [facility] LAB DRAW   [facility]    ONC INFUSION 1 HR (60 MIN)   1:00 PM   (60 min.)    ONC INFUSION NURSE   [facility] 22     23     24     25       26     27     28     29     30                               Lab Results:  Recent Results (from the past 12 hour(s))   Comprehensive metabolic panel    Collection Time: 08/10/21 12:48 PM   Result Value Ref Range    Sodium 142 133 - 144 mmol/L    Potassium 4.0 3.4 - 5.3 mmol/L    Chloride 108 94 - 109 mmol/L    Carbon Dioxide (CO2) 29 20 - 32 mmol/L    Anion Gap 5 3 - 14 mmol/L    Urea Nitrogen 10 7 - 30 mg/dL    Creatinine 0.75 0.52 - 1.04 mg/dL    Calcium 8.8 8.5 - 10.1 mg/dL    Glucose 108 (H) 70 - 99 mg/dL    Alkaline Phosphatase 58 40 - 150 U/L    AST 46 (H) 0 - 45 U/L    ALT 71 (H) 0 - 50 U/L    Protein Total 7.4 6.8 - 8.8 g/dL    Albumin 3.2 (L) 3.4 - 5.0 g/dL    Bilirubin Total 0.2 0.2 - 1.3 mg/dL    GFR Estimate 84 >60 mL/min/1.73m2   CBC with platelets and differential    Collection Time: 08/10/21 12:48 PM   Result Value Ref Range    WBC Count 6.3 4.0 - 11.0 10e3/uL    RBC Count 4.03 3.80 - 5.20 10e6/uL    Hemoglobin 12.0 11.7 - 15.7 g/dL    Hematocrit 38.8 35.0 - 47.0 %    MCV 96 78 - 100 fL    MCH 29.8 26.5 - 33.0 pg    MCHC 30.9 (L) 31.5 - 36.5 g/dL    RDW 13.8 10.0 - 15.0 %    Platelet Count 204 150 - 450 10e3/uL    % Neutrophils 46 %    % Lymphocytes 42 %    % Monocytes 8 %    % Eosinophils 4 %    % Basophils 0 %    % Immature Granulocytes 0 %    NRBCs per 100 WBC 0 <1 /100    Absolute Neutrophils 2.9 1.6 - 8.3 10e3/uL    Absolute Lymphocytes 2.6 0.8 - 5.3 10e3/uL    Absolute Monocytes 0.5 0.0 - 1.3 10e3/uL    Absolute Eosinophils 0.2 0.0 - 0.7 10e3/uL    Absolute Basophils 0.0 0.0 - 0.2 10e3/uL     Absolute Immature Granulocytes 0.0 <=0.0 10e3/uL    Absolute NRBCs 0.0 10e3/uL

## 2021-08-10 NOTE — PROGRESS NOTES
Infusion Nursing Note:  Hdoa Brush presents today for C67D1 Herceptin.    Patient seen by provider today: No   present during visit today: Not Applicable.    Note: No complaints made.     Intravenous Access:  Implanted Port.    Treatment Conditions:    Lab Results   Component Value Date    HGB 12.0 08/10/2021    HGB 12.5 06/22/2021     Lab Results   Component Value Date    WBC 6.3 08/10/2021    WBC 6.5 06/22/2021      Lab Results   Component Value Date    ANEU 3.1 06/22/2021     Lab Results   Component Value Date     08/10/2021     06/22/2021      Lab Results   Component Value Date     08/10/2021     06/22/2021                   Lab Results   Component Value Date    POTASSIUM 4.0 08/10/2021    POTASSIUM 4.0 06/22/2021           No results found for: MAG         Lab Results   Component Value Date    CR 0.75 08/10/2021    CR 0.81 06/22/2021                   Lab Results   Component Value Date    TAYLER 8.8 08/10/2021    TAYLER 9.6 06/22/2021                Lab Results   Component Value Date    BILITOTAL 0.2 08/10/2021    BILITOTAL 0.3 06/22/2021           Lab Results   Component Value Date    ALBUMIN 3.2 08/10/2021    ALBUMIN 3.4 06/22/2021                    Lab Results   Component Value Date    ALT 71 08/10/2021    ALT 80 06/22/2021           Lab Results   Component Value Date    AST 46 08/10/2021    AST 48 06/22/2021       Results reviewed, labs MET treatment parameters, ok to proceed with treatment.  ECHO/MUGA completed 6/1/21  EF 55-60%.    Post Infusion Assessment:  Patient tolerated infusion without incident.  Blood return noted pre and post infusion.  Site patent and intact, free from redness, edema or discomfort.  No evidence of extravasations.  Access discontinued per protocol.       Discharge Plan:   Patient declined prescription refills.  Discharge instructions reviewed with: Patient.  Patient and/or family verbalized understanding of discharge instructions and all  questions answered.  AVS to patient via [employer].  Patient will return 8/31/21 for next appointment.   Patient discharged in stable condition accompanied by: self.  Departure Mode: Ambulatory.      EMILY WICK RN

## 2021-08-10 NOTE — NURSING NOTE
Chief Complaint   Patient presents with     Port Draw     Labs drawn from port in lab by RN. VS taken.      Port accessed with 20g gripper needle by RN, labs collected, line flushed with saline and heparin.  Vitals taken. Pt checked in for appointment(s).    Mike Charlton RN

## 2021-08-30 ENCOUNTER — ANCILLARY PROCEDURE (OUTPATIENT)
Dept: CARDIOLOGY | Facility: CLINIC | Age: 66
End: 2021-08-30
Attending: INTERNAL MEDICINE
Payer: COMMERCIAL

## 2021-08-30 DIAGNOSIS — Z51.11 ENCOUNTER FOR ANTINEOPLASTIC CHEMOTHERAPY: ICD-10-CM

## 2021-08-30 LAB — LVEF ECHO: NORMAL

## 2021-08-30 PROCEDURE — 93306 TTE W/DOPPLER COMPLETE: CPT | Performed by: STUDENT IN AN ORGANIZED HEALTH CARE EDUCATION/TRAINING PROGRAM

## 2021-08-31 ENCOUNTER — APPOINTMENT (OUTPATIENT)
Dept: LAB | Facility: CLINIC | Age: 66
End: 2021-08-31
Attending: INTERNAL MEDICINE
Payer: COMMERCIAL

## 2021-08-31 ENCOUNTER — INFUSION THERAPY VISIT (OUTPATIENT)
Dept: ONCOLOGY | Facility: CLINIC | Age: 66
End: 2021-08-31
Attending: INTERNAL MEDICINE
Payer: COMMERCIAL

## 2021-08-31 VITALS
BODY MASS INDEX: 32.09 KG/M2 | TEMPERATURE: 98 F | HEART RATE: 78 BPM | RESPIRATION RATE: 16 BRPM | WEIGHT: 181.1 LBS | DIASTOLIC BLOOD PRESSURE: 76 MMHG | SYSTOLIC BLOOD PRESSURE: 116 MMHG | OXYGEN SATURATION: 97 %

## 2021-08-31 DIAGNOSIS — C79.51 BONE METASTASIS: ICD-10-CM

## 2021-08-31 DIAGNOSIS — C50.912 MALIGNANT NEOPLASM OF LEFT FEMALE BREAST, UNSPECIFIED ESTROGEN RECEPTOR STATUS, UNSPECIFIED SITE OF BREAST (H): Primary | ICD-10-CM

## 2021-08-31 LAB
ALBUMIN SERPL-MCNC: 3.3 G/DL (ref 3.4–5)
ALP SERPL-CCNC: 58 U/L (ref 40–150)
ALT SERPL W P-5'-P-CCNC: 86 U/L (ref 0–50)
ANION GAP SERPL CALCULATED.3IONS-SCNC: 5 MMOL/L (ref 3–14)
AST SERPL W P-5'-P-CCNC: 60 U/L (ref 0–45)
BASOPHILS # BLD AUTO: 0 10E3/UL (ref 0–0.2)
BASOPHILS NFR BLD AUTO: 1 %
BILIRUB SERPL-MCNC: 0.2 MG/DL (ref 0.2–1.3)
BUN SERPL-MCNC: 12 MG/DL (ref 7–30)
CALCIUM SERPL-MCNC: 9 MG/DL (ref 8.5–10.1)
CANCER AG27-29 SERPL-ACNC: 10 U/ML (ref 0–39)
CEA SERPL-MCNC: <0.5 UG/L (ref 0–2.5)
CHLORIDE BLD-SCNC: 108 MMOL/L (ref 94–109)
CO2 SERPL-SCNC: 29 MMOL/L (ref 20–32)
CREAT SERPL-MCNC: 0.7 MG/DL (ref 0.52–1.04)
EOSINOPHIL # BLD AUTO: 0.3 10E3/UL (ref 0–0.7)
EOSINOPHIL NFR BLD AUTO: 3 %
ERYTHROCYTE [DISTWIDTH] IN BLOOD BY AUTOMATED COUNT: 13.8 % (ref 10–15)
GFR SERPL CREATININE-BSD FRML MDRD: >90 ML/MIN/1.73M2
GLUCOSE BLD-MCNC: 89 MG/DL (ref 70–99)
HCT VFR BLD AUTO: 40.4 % (ref 35–47)
HGB BLD-MCNC: 12.7 G/DL (ref 11.7–15.7)
IMM GRANULOCYTES # BLD: 0 10E3/UL
IMM GRANULOCYTES NFR BLD: 0 %
LYMPHOCYTES # BLD AUTO: 3 10E3/UL (ref 0.8–5.3)
LYMPHOCYTES NFR BLD AUTO: 40 %
MCH RBC QN AUTO: 30 PG (ref 26.5–33)
MCHC RBC AUTO-ENTMCNC: 31.4 G/DL (ref 31.5–36.5)
MCV RBC AUTO: 95 FL (ref 78–100)
MONOCYTES # BLD AUTO: 0.6 10E3/UL (ref 0–1.3)
MONOCYTES NFR BLD AUTO: 8 %
NEUTROPHILS # BLD AUTO: 3.6 10E3/UL (ref 1.6–8.3)
NEUTROPHILS NFR BLD AUTO: 48 %
NRBC # BLD AUTO: 0 10E3/UL
NRBC BLD AUTO-RTO: 0 /100
PLATELET # BLD AUTO: 242 10E3/UL (ref 150–450)
POTASSIUM BLD-SCNC: 4.1 MMOL/L (ref 3.4–5.3)
PROT SERPL-MCNC: 7.9 G/DL (ref 6.8–8.8)
RBC # BLD AUTO: 4.24 10E6/UL (ref 3.8–5.2)
SODIUM SERPL-SCNC: 142 MMOL/L (ref 133–144)
WBC # BLD AUTO: 7.5 10E3/UL (ref 4–11)

## 2021-08-31 PROCEDURE — 99214 OFFICE O/P EST MOD 30 MIN: CPT | Mod: 95 | Performed by: INTERNAL MEDICINE

## 2021-08-31 PROCEDURE — 96413 CHEMO IV INFUSION 1 HR: CPT

## 2021-08-31 PROCEDURE — 96372 THER/PROPH/DIAG INJ SC/IM: CPT | Mod: 59 | Performed by: INTERNAL MEDICINE

## 2021-08-31 PROCEDURE — 36591 DRAW BLOOD OFF VENOUS DEVICE: CPT | Performed by: INTERNAL MEDICINE

## 2021-08-31 PROCEDURE — 258N000003 HC RX IP 258 OP 636: Performed by: INTERNAL MEDICINE

## 2021-08-31 PROCEDURE — 82378 CARCINOEMBRYONIC ANTIGEN: CPT | Performed by: INTERNAL MEDICINE

## 2021-08-31 PROCEDURE — 85025 COMPLETE CBC W/AUTO DIFF WBC: CPT | Performed by: INTERNAL MEDICINE

## 2021-08-31 PROCEDURE — 82040 ASSAY OF SERUM ALBUMIN: CPT | Performed by: INTERNAL MEDICINE

## 2021-08-31 PROCEDURE — 250N000011 HC RX IP 250 OP 636: Performed by: INTERNAL MEDICINE

## 2021-08-31 PROCEDURE — 86300 IMMUNOASSAY TUMOR CA 15-3: CPT | Performed by: INTERNAL MEDICINE

## 2021-08-31 RX ORDER — HEPARIN SODIUM (PORCINE) LOCK FLUSH IV SOLN 100 UNIT/ML 100 UNIT/ML
5 SOLUTION INTRAVENOUS
Status: COMPLETED | OUTPATIENT
Start: 2021-08-31 | End: 2021-08-31

## 2021-08-31 RX ORDER — HEPARIN SODIUM (PORCINE) LOCK FLUSH IV SOLN 100 UNIT/ML 100 UNIT/ML
5 SOLUTION INTRAVENOUS EVERY 8 HOURS
Status: DISCONTINUED | OUTPATIENT
Start: 2021-08-31 | End: 2021-08-31 | Stop reason: HOSPADM

## 2021-08-31 RX ADMIN — DENOSUMAB 120 MG: 120 INJECTION SUBCUTANEOUS at 13:28

## 2021-08-31 RX ADMIN — SODIUM CHLORIDE 250 ML: 9 INJECTION, SOLUTION INTRAVENOUS at 13:27

## 2021-08-31 RX ADMIN — TRASTUZUMAB 450 MG: 150 INJECTION, POWDER, LYOPHILIZED, FOR SOLUTION INTRAVENOUS at 13:35

## 2021-08-31 RX ADMIN — Medication 5 ML: at 12:39

## 2021-08-31 RX ADMIN — Medication 5 ML: at 14:07

## 2021-08-31 ASSESSMENT — PAIN SCALES - GENERAL: PAINLEVEL: NO PAIN (0)

## 2021-08-31 NOTE — PROGRESS NOTES
[place]  How would you like to obtain your AVS? MyChart  If the video visit is dropped, the invitation should be resent by: Text to cell phone: [phone]  Will anyone else be joining your video visit? [place]Patient Location [place]

## 2021-08-31 NOTE — PATIENT INSTRUCTIONS
[facility] Main Line: [phone]    Call triage nurse with chills and/or temperature greater than or equal to 100.4, uncontrolled nausea/vomiting, diarrhea, constipation, dizziness, shortness of breath, chest pain, bleeding, unexplained bruising, or any new/concerning symptoms, questions/concerns.   If you are having any concerning symptoms or wish to speak to a provider before your next infusion visit, please call your care coordinator or triage to notify them so we can adequately serve you.   Nurse Triage line:  [phone]    If after hours, weekends, or holidays, call main hospital  and ask for Oncology doctor on call @ [phone]      August 2021 Sunday Monday Tuesday Wednesday Thursday Friday Saturday   1     2     3     4     5     6     7       8     9     10    LAB CENTRAL  12:30 PM   (15 min.)   [facility]   [facility]    ONC INFUSION 1 HR (60 MIN)   1:00 PM   (60 min.)    ONC INFUSION NURSE   [facility] 11     12     13     14       15     16     17     18     19     20     21       22     23     24     25     26     27     28       29     30    ECHO COMPLETE  10:15 AM   (60 min.)   UCECHCR1   [facility] 31    VIDEO VISIT RETURN   8:45 AM   (30 min.)   Lisandro Aguiar MD   [facility]    LAB CENTRAL  12:30 PM   (15 min.)    [facility]   [facility]    ONC INFUSION 1 HR (60 MIN)   1:00 PM   (60 min.)    ONC INFUSION NURSE   [facility]                                 September 2021 Sunday Monday Tuesday Wednesday Thursday Friday Saturday                  1     2     3     4       5     6     7     8     9     10     11       12     13     14     15     16     17     18       19     20     21    LAB CENTRAL  12:30 PM   (15 min.)    [facility]   [facility]  [facility]    ONC INFUSION 1 HR (60 MIN)   1:00 PM   (60 min.)    ONC INFUSION NURSE   [facility] 22     23     24     25       26     27     28     29     30                               Lab Results:  Recent Results (from the past 12 hour(s))   Comprehensive metabolic panel    Collection Time: 08/31/21 12:49 PM   Result Value Ref Range    Sodium 142 133 - 144 mmol/L    Potassium 4.1 3.4 - 5.3 mmol/L    Chloride 108 94 - 109 mmol/L    Carbon Dioxide (CO2) 29 20 - 32 mmol/L    Anion Gap 5 3 - 14 mmol/L    Urea Nitrogen 12 7 - 30 mg/dL    Creatinine 0.70 0.52 - 1.04 mg/dL    Calcium 9.0 8.5 - 10.1 mg/dL    Glucose 89 70 - 99 mg/dL    Alkaline Phosphatase 58 40 - 150 U/L    AST 60 (H) 0 - 45 U/L    ALT 86 (H) 0 - 50 U/L    Protein Total 7.9 6.8 - 8.8 g/dL    Albumin 3.3 (L) 3.4 - 5.0 g/dL    Bilirubin Total 0.2 0.2 - 1.3 mg/dL    GFR Estimate >90 >60 mL/min/1.73m2   CBC with platelets and differential    Collection Time: 08/31/21 12:49 PM   Result Value Ref Range    WBC Count 7.5 4.0 - 11.0 10e3/uL    RBC Count 4.24 3.80 - 5.20 10e6/uL    Hemoglobin 12.7 11.7 - 15.7 g/dL    Hematocrit 40.4 35.0 - 47.0 %    MCV 95 78 - 100 fL    MCH 30.0 26.5 - 33.0 pg    MCHC 31.4 (L) 31.5 - 36.5 g/dL    RDW 13.8 10.0 - 15.0 %    Platelet Count 242 150 - 450 10e3/uL    % Neutrophils 48 %    % Lymphocytes 40 %    % Monocytes 8 %    % Eosinophils 3 %    % Basophils 1 %    % Immature Granulocytes 0 %    NRBCs per 100 WBC 0 <1 /100    Absolute Neutrophils 3.6 1.6 - 8.3 10e3/uL    Absolute Lymphocytes 3.0 0.8 - 5.3 10e3/uL    Absolute Monocytes 0.6 0.0 - 1.3 10e3/uL    Absolute Eosinophils 0.3 0.0 - 0.7 10e3/uL    Absolute Basophils 0.0 0.0 - 0.2 10e3/uL    Absolute Immature Granulocytes 0.0 <=0.0 10e3/uL    Absolute NRBCs 0.0 10e3/uL

## 2021-08-31 NOTE — LETTER
8/31/2021         RE: Hoda Brush  [address]  [place] [address]        Dear Colleague,    Thank you for referring your patient, Hoda Brush, to the [facility]. Please see a copy of my visit note below.    Hoda is a patient from [facility] and is seen here for continuation of care for her metastatic ER+HER2- breast cancer.  She is a [employer] refugee from [facility] and was initially seen in clinic with her daughter.  The only data we have from [facility] is an English translation of her records.       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  On 02/11/2014, she was seen by an oncologist.  The clinical staging of T4b N2 M1 was noted.   Her breast cancer was on the left side. There was no right breast cancer, confirmed by the patient and her daughter, correcting a possible error in the translated records.  ER was positive in 100% of the cells, MO at 14% and HER2 was 3+ positive.  It appears that this histopathologic information is on the mastectomy specimen. She underwent an MRI for staging of presumptive bone metastases which was performed 03/02/2014.  There were skeletal metastases in the thoracic vertebrae at 12, L1, L3, L5 and S1 vertebral body, ranging in size from 0.7-3.0 cm in size.  Ischial and right femur were also involved, as well as a large iliac mass measuring about 15 cm in the uterus. 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   Radiation Oncology consultation was performed 03/11/2014, and she was given radiation in 1 dose of 6 Gy to the right iliac lesion.        With the initial diagnosis, she initiated treatment with 6 cycles of CAF neoadjuvant chemotherapy 02/14, 07/07, 03/28, 04/18/14, 05/08 and 05/29/14. She also received monthly zoledronic acid.  She then underwent a modified left mastectomy of Palacios type and left lymphadenoectomy on 06/27/2014.    Pathologic examination showed number at [facility] was [id number] showed infiltrative grade 2 ductal cancer with 6 level 1 metastatic lympFollow up with Jossie for visits and trastuzumab on 2-5, 2-26, 3-19 with CBC, CMP.  Echocardiogram on 3-19.  Follow up with me 4-9 with CBC, CMP, CA27.29 and CEA and with CT CAP on 4-8.h nodes and 3 level 2 metastatic lymph nodes.  The differentiation was intermediate.  The tumor was ER positive 70% of the cells, HI positive in 40% of the cells and HER2 was 3+ by immunohistochemistry and the Ki-67 labeling index was 20%. Her staging after surgery was stage IV, pT4b N2 M1.  I don't see a biopsy of the skeletal metastases.  She then had continuation with chemotherapy with 4 cycles of Herceptin and taxane with monthly zoledronic acid.  Tamoxifen was initiated.  She then had two years of Herceptin and a decision was made not to continue further Herceptin.  She continued on zoledronic acid every 3 months. She was clinically stable. [place] the [place] new [place] from [facility].  She has now been changed to letrozole.  Denosumab has now been held for new diagnosis of osteonecrosis of the R maxilla.     History of cholecystectomy 2020.      TREATMENT HISTORY:  A. Initial diagnosis with metastatic breast cancer in [facility].  Neoadjuvant CAF x 6.   B. Left mastectomy. Left axillary node dissection.  C.  Radiation in 1 dose to R iliac region.  C. Herceptin for 2 years taxane for a prescribed course then stopped, monthly zoledronic acid.  She had 2 years of Herceptin with tamoxifen added after chemotherapy.  D.  Tamoxifen alone and zoledronic acid every 3 months starting 2014.  Letrozole was started   E.  [place]  We restarted Herceptin every 3 weeks and continued tamoxifen. Bone targeted agent changed to denosumab every 9 weeks. Zometa discontinued 2017.  Tamoxifen was changed to letrozole August 2019.    GIANLUCA  Xgeva discontinued 12-17-19 because of dental  issues.   G.  J+J vaccine March, 2021.  H.  Was on Zometa once May 11.  Reaction with eye lid swelling. Discontinued Zometal  H.  Restart Xgeva 6-22-21.  Continuing the trastuzumab and letrozole.  Both tolerated well.       INTERVAL HISTORY  She has been doing generally well.  She has some low back pain which is chronic.  No fatigue, no depression, no diarrhea.  Her dental problems were resolved about 6 months ago and she can restart Xgeva every 3 months.  Protruding bone was removed. No joint stiffness with letrozole.      She had the J+J vaccine.      REVIEW OF SYSTEMS:  She denies fevers or chills, cough, chest pain, shortness of breath, nausea or vomiting, constipation, diarrhea, bone pain, or headache.  She does have a chronic low back pain.  A 10 point review of systems is negative.     PHYSICAL EXAM:  This was a phone visit with [place] .    Mood and affect normal.      Labs  CMP was normal.  CBC normal.        Imaging   CT CAP 6-21-21  IMPRESSION:  1.  No significant change in the sclerotic osseous metastasis.  2.  Stable pulmonary nodules measuring up to 6 mm.  3.  No new metastatic disease.     MARYANNE FUNEZ MD     Global and regional left ventricular function is normal with an EF of 55-60%.     ASSESSMENT AND PLAN:     1.  Hoda Brush is a 66-year-old woman with a history of ER-positive, MA-positive, HER2 positive breast cancer.  She is from [facility], formally from [facility], and came to live in the [place] with [place] daughter.  She is here for continuation of every 3-week Herceptin, which she has bee no.  N receiving along with tamoxifen daily.  The tumor is ER positive, MA positive and HER2 positive.  She had metastatic disease at the time of presentation with bone-only metastases by report.  She underwent neoadjuvant CAF, had a left mastectomy, left axillary lymph node dissection, radiation to the right hip, which included the right iliac region where she had metastatic disease.  She  was initially on tamoxifen but then was switched to letrozole.  She continues on this and every 3-week trastuzumab.  She has had no evidence of disease progression for the last 3 years.  We have continued Herceptin every 3 weeks as well as daily letrozole. CT CAP yesterday showed stable disease.    2.  Restaging. We reviewed imaging with her and she is pleased that there is no evidence of progression of her metastatic breast cancer. --Continue to tolerate treatment well. - Tumor markers  have been stable.    3.  Mild transaminase elevations.  Restaging showed no evidence of liver metastases.  These LFT elevations may be due to fatty liver.  4.  Continue the letrozole.   5.  Echo. Stable EF. --Echo in  showed normal EF without change 60 to 65%. 2-16-21.  6.  Discussion of bone health and right maxillary osteonecrosis.  She will continue with calcium and vitamin D.  Restart Xgeva in 3 weeks for osteoporosis because right mandible problem was treated by her dentist and has resolved.   7. Follow up.     Trastuzumab and Xgeva today.  Trastuzumab September 21 October 12 with CBC and CMP all visits.  CT CAP October 11 with CBC and CMP.  Xgeva every other visit, next October 12.  Visit with me by phone visit October 12. I did recommend the Pfizer COVID vaccine booster. She was reluctant to have the Pfizer vaccine as a booster because she originally received PhytoCeutica. I explained to her that Pfizer should be fine.     Thank you for allowing us to participate in this patient's care.      Sincerely,      Lisandro Aguiar MD  Professor  [facility]  [phone].       Patient was interviewed and discussed with Dr. Aguiar          10:03-10:13  I spent 10 minutes with the patient more than 50% of which was in counseling and coordination of care.     [place]  How would you like to obtain your AVS? MyChart  If the video visit is dropped, the invitation should be resent by: Text to cell phone:  [phone]  Will anyone else be joining your video visit? [place]Patient Location [place]        Again, thank you for allowing me to participate in the care of your patient.        Sincerely,        Lisandro Aguiar MD

## 2021-08-31 NOTE — PROGRESS NOTES
Infusion Nursing Note:  Hoda Brush presents today for C68D1 Herceptin/C3D1 Xgeva.    Patient seen by provider today: Yes: Dr Aguiar   present during visit today: Yes, Language: Tajik-Patient declined  today.  Note: Patient reported to clinic today with no new complaints or concerns since talking with provider.    Intravenous Access:  Implanted Port.    Treatment Conditions:  Lab Results   Component Value Date    HGB 12.7 08/31/2021    WBC 7.5 08/31/2021    ANEU 3.1 06/22/2021    ANEUTAUTO 3.6 08/31/2021     08/31/2021      Lab Results   Component Value Date     08/31/2021    POTASSIUM 4.1 08/31/2021    CR 0.70 08/31/2021    TAYLER 9.0 08/31/2021    BILITOTAL 0.2 08/31/2021    ALBUMIN 3.3 (L) 08/31/2021    ALT 86 (H) 08/31/2021    AST 60 (H) 08/31/2021     Results reviewed, labs MET treatment parameters, ok to proceed with treatment.    Post Infusion Assessment:  Patient tolerated infusion without incident.  Patient tolerated one injection of Xgeva in back of right arm SubQ.  Blood return noted pre and post infusion.  Site patent and intact, free from redness, edema or discomfort.  No evidence of extravasations.  Access discontinued per protocol.     Discharge Plan:   Patient declined prescription refills.  Discharge instructions reviewed with: Patient.  Patient and/or family verbalized understanding of discharge instructions and all questions answered.  AVS to patient via [employer].  Patient will return 9/21/21 for next appointment.   Patient discharged in stable condition accompanied by: self.  Departure Mode: Ambulatory.  Face to Face time: 0 minutes.    Cash Becerril RN

## 2021-08-31 NOTE — PROGRESS NOTES
Hoda is a patient from [facility] and is seen here for continuation of care for her metastatic ER+HER2- breast cancer.  She is a [employer] refugee from [facility] and was initially seen in clinic with her daughter.  The only data we have from [facility] is an English translation of her records.       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  On 02/11/2014, she was seen by an oncologist.  The clinical staging of T4b N2 M1 was noted.   Her breast cancer was on the left side. There was no right breast cancer, confirmed by the patient and her daughter, correcting a possible error in the translated records.  ER was positive in 100% of the cells, VA at 14% and HER2 was 3+ positive.  It appears that this histopathologic information is on the mastectomy specimen. She underwent an MRI for staging of presumptive bone metastases which was performed 03/02/2014.  There were skeletal metastases in the thoracic vertebrae at 12, L1, L3, L5 and S1 vertebral body, ranging in size from 0.7-3.0 cm in size.  Ischial and right femur were also involved, as well as a large iliac mass measuring about 15 cm in the uterus. 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   Radiation Oncology consultation was performed 03/11/2014, and she was given radiation in 1 dose of 6 Gy to the right iliac lesion.        With the initial diagnosis, she initiated treatment with 6 cycles of CAF neoadjuvant chemotherapy 02/14, 07/07, 03/28, 04/18/14, 05/08 and 05/29/14. She also received monthly zoledronic acid.  She then underwent a modified left mastectomy of [place] type and left lymphadenoectomy on 06/27/2014.   Pathologic examination showed number at [facility] was 182150-192/[id number] showed infiltrative grade 2 ductal cancer with 6 level 1 metastatic lympFollow up with Jossie for visits and trastuzumab on 2-5, 2-26, 3-19 with CBC, CMP.  Echocardiogram on 3-19.  Follow up with me 4-9 with  CBC, CMP, CA27.29 and CEA and with CT CAP on 4-8.h nodes and 3 level 2 metastatic lymph nodes.  The differentiation was intermediate.  The tumor was ER positive 70% of the cells, WA positive in 40% of the cells and HER2 was 3+ by immunohistochemistry and the Ki-67 labeling index was 20%. Her staging after surgery was stage IV, pT4b N2 M1.  I don't see a biopsy of the skeletal metastases.  She then had continuation with chemotherapy with 4 cycles of Herceptin and taxane with monthly zoledronic acid.  Tamoxifen was initiated.  She then had two years of Herceptin and a decision was made not to continue further Herceptin.  She continued on zoledronic acid every 3 months. She was clinically stable. She then moved to the [place] as new refugee from [facility].  She has now been changed to letrozole.  Denosumab has now been held for new diagnosis of osteonecrosis of the R maxilla.     History of cholecystectomy 2020.      TREATMENT HISTORY:  A. Initial diagnosis with metastatic breast cancer in [facility].  Neoadjuvant CAF x 6.   B. Left mastectomy. Left axillary node dissection.  C.  Radiation in 1 dose to R iliac region.  C. Herceptin for 2 years taxane for a prescribed course then stopped, monthly zoledronic acid.  She had 2 years of Herceptin with tamoxifen added after chemotherapy.  D.  Tamoxifen alone and zoledronic acid every 3 months starting 2014.  Letrozole was started   E.  [place]  We restarted Herceptin every 3 weeks and continued tamoxifen. Bone targeted agent changed to denosumab every 9 weeks. Zometa discontinued 2017.  Tamoxifen was changed to letrozole August 2019.    F.  Xgeva discontinued 12-17-19 because of dental issues.   G.  J+J vaccine March, 2021.  H.  Was on Zometa once May 11.  Reaction with eye lid swelling. Discontinued Zometal  H.  Restart Xgeva 6-22-21.  Continuing the trastuzumab and letrozole.  Both tolerated well.       INTERVAL HISTORY  She has been doing generally well.  She has some  low back pain which is chronic.  No fatigue, no depression, no diarrhea.  Her dental problems were resolved about 6 months ago and she can restart Xgeva every 3 months.  Protruding bone was removed. No joint stiffness with letrozole.      She had the J+J vaccine.      REVIEW OF SYSTEMS:  She denies fevers or chills, cough, chest pain, shortness of breath, nausea or vomiting, constipation, diarrhea, bone pain, or headache.  She does have a chronic low back pain.  A 10 point review of systems is negative.     PHYSICAL EXAM:  This was a phone visit with Spanish .    Mood and affect normal.      Labs  CMP was normal.  CBC normal.        Imaging   CT CAP 6-21-21  IMPRESSION:  1.  No significant change in the sclerotic osseous metastasis.  2.  Stable pulmonary nodules measuring up to 6 mm.  3.  No new metastatic disease.     MARYANNE FUNEZ MD     Global and regional left ventricular function is normal with an EF of 55-60%.     ASSESSMENT AND PLAN:     1.  Hoda Brush is a 66-year-old woman with a history of ER-positive, OH-positive, HER2 positive breast cancer.  She is from [facility], formally from [facility], and came to live in the [place] with her daughter.  She is here for continuation of every 3-week Herceptin, which she has bee no.  N receiving along with tamoxifen daily.  The tumor is ER positive, OH positive and HER2 positive.  She had metastatic disease at the time of presentation with bone-only metastases by report.  She underwent neoadjuvant CAF, had a left mastectomy, left axillary lymph node dissection, radiation to the right hip, which included the right iliac region where she had metastatic disease.  She was initially on tamoxifen but then was switched to letrozole.  She continues on this and every 3-week trastuzumab.  She has had no evidence of disease progression for the last 3 years.  We have continued Herceptin every 3 weeks as well as daily letrozole. CT CAP yesterday showed stable  disease.    2.  Restaging. We reviewed imaging with her and she is pleased that there is no evidence of progression of her metastatic breast cancer. --Continue to tolerate treatment well. - Tumor markers  have been stable.    3.  Mild transaminase elevations.  Restaging showed no evidence of liver metastases.  These LFT elevations may be due to fatty liver.  4.  Continue the letrozole.   5.  Echo. Stable EF. --Echo in  showed normal EF without change 60 to 65%. 2-16-21.  6.  Discussion of bone health and right maxillary osteonecrosis.  She will continue with calcium and vitamin D.  Restart Xgeva in 3 weeks for osteoporosis because right mandible problem was treated by her dentist and has resolved.   7. Follow up.     Trastuzumab and Xgeva today.  Trastuzumab September 21 October 12 with CBC and CMP all visits.  CT CAP October 11 with CBC and CMP.  Xgeva every other visit, next October 12.  Visit with me by phone visit October 12. I did recommend the Pfizer COVID vaccine booster. She was reluctant to have the Pfizer vaccine as a booster because she originally received Moda2Ride. I explained to her that Pfizer should be fine.     Thank you for allowing us to participate in this patient's care.      Sincerely,      Lisandro Aguiar MD  Professor  [facility]  [phone].       Patient was interviewed and discussed with Dr. Aguiar          10:03-10:13  I spent 10 minutes with the patient more than 50% of which was in counseling and coordination of care.

## 2021-09-18 DIAGNOSIS — C50.919 METASTATIC BREAST CANCER: ICD-10-CM

## 2021-09-21 ENCOUNTER — APPOINTMENT (OUTPATIENT)
Dept: LAB | Facility: CLINIC | Age: 66
End: 2021-09-21
Attending: INTERNAL MEDICINE
Payer: COMMERCIAL

## 2021-09-21 ENCOUNTER — INFUSION THERAPY VISIT (OUTPATIENT)
Dept: ONCOLOGY | Facility: CLINIC | Age: 66
End: 2021-09-21
Attending: INTERNAL MEDICINE
Payer: COMMERCIAL

## 2021-09-21 VITALS
TEMPERATURE: 98.3 F | RESPIRATION RATE: 16 BRPM | OXYGEN SATURATION: 96 % | BODY MASS INDEX: 32.11 KG/M2 | WEIGHT: 181.2 LBS | HEART RATE: 88 BPM | SYSTOLIC BLOOD PRESSURE: 116 MMHG | DIASTOLIC BLOOD PRESSURE: 71 MMHG

## 2021-09-21 DIAGNOSIS — C50.912 MALIGNANT NEOPLASM OF LEFT FEMALE BREAST, UNSPECIFIED ESTROGEN RECEPTOR STATUS, UNSPECIFIED SITE OF BREAST (H): Primary | ICD-10-CM

## 2021-09-21 LAB
ALBUMIN SERPL-MCNC: 3.2 G/DL (ref 3.4–5)
ALP SERPL-CCNC: 58 U/L (ref 40–150)
ALT SERPL W P-5'-P-CCNC: 76 U/L (ref 0–50)
ANION GAP SERPL CALCULATED.3IONS-SCNC: 4 MMOL/L (ref 3–14)
AST SERPL W P-5'-P-CCNC: 51 U/L (ref 0–45)
BASOPHILS # BLD AUTO: 0 10E3/UL (ref 0–0.2)
BASOPHILS NFR BLD AUTO: 1 %
BILIRUB SERPL-MCNC: 0.2 MG/DL (ref 0.2–1.3)
BUN SERPL-MCNC: 11 MG/DL (ref 7–30)
CALCIUM SERPL-MCNC: 9 MG/DL (ref 8.5–10.1)
CANCER AG27-29 SERPL-ACNC: 8 U/ML (ref 0–39)
CEA SERPL-MCNC: 0.8 UG/L (ref 0–2.5)
CHLORIDE BLD-SCNC: 109 MMOL/L (ref 94–109)
CO2 SERPL-SCNC: 30 MMOL/L (ref 20–32)
CREAT SERPL-MCNC: 0.81 MG/DL (ref 0.52–1.04)
EOSINOPHIL # BLD AUTO: 0.3 10E3/UL (ref 0–0.7)
EOSINOPHIL NFR BLD AUTO: 4 %
ERYTHROCYTE [DISTWIDTH] IN BLOOD BY AUTOMATED COUNT: 13.5 % (ref 10–15)
GFR SERPL CREATININE-BSD FRML MDRD: 76 ML/MIN/1.73M2
GLUCOSE BLD-MCNC: 118 MG/DL (ref 70–99)
HCT VFR BLD AUTO: 41.1 % (ref 35–47)
HGB BLD-MCNC: 12.7 G/DL (ref 11.7–15.7)
IMM GRANULOCYTES # BLD: 0 10E3/UL
IMM GRANULOCYTES NFR BLD: 0 %
LYMPHOCYTES # BLD AUTO: 2.9 10E3/UL (ref 0.8–5.3)
LYMPHOCYTES NFR BLD AUTO: 42 %
MCH RBC QN AUTO: 29.1 PG (ref 26.5–33)
MCHC RBC AUTO-ENTMCNC: 30.9 G/DL (ref 31.5–36.5)
MCV RBC AUTO: 94 FL (ref 78–100)
MONOCYTES # BLD AUTO: 0.4 10E3/UL (ref 0–1.3)
MONOCYTES NFR BLD AUTO: 6 %
NEUTROPHILS # BLD AUTO: 3.4 10E3/UL (ref 1.6–8.3)
NEUTROPHILS NFR BLD AUTO: 47 %
NRBC # BLD AUTO: 0 10E3/UL
NRBC BLD AUTO-RTO: 0 /100
PLATELET # BLD AUTO: 247 10E3/UL (ref 150–450)
POTASSIUM BLD-SCNC: 3.9 MMOL/L (ref 3.4–5.3)
PROT SERPL-MCNC: 7.6 G/DL (ref 6.8–8.8)
RBC # BLD AUTO: 4.36 10E6/UL (ref 3.8–5.2)
SODIUM SERPL-SCNC: 143 MMOL/L (ref 133–144)
WBC # BLD AUTO: 7 10E3/UL (ref 4–11)

## 2021-09-21 PROCEDURE — 82040 ASSAY OF SERUM ALBUMIN: CPT | Performed by: INTERNAL MEDICINE

## 2021-09-21 PROCEDURE — 96413 CHEMO IV INFUSION 1 HR: CPT

## 2021-09-21 PROCEDURE — 82378 CARCINOEMBRYONIC ANTIGEN: CPT | Performed by: INTERNAL MEDICINE

## 2021-09-21 PROCEDURE — 85025 COMPLETE CBC W/AUTO DIFF WBC: CPT | Performed by: INTERNAL MEDICINE

## 2021-09-21 PROCEDURE — 36591 DRAW BLOOD OFF VENOUS DEVICE: CPT | Performed by: INTERNAL MEDICINE

## 2021-09-21 PROCEDURE — 250N000011 HC RX IP 250 OP 636: Performed by: INTERNAL MEDICINE

## 2021-09-21 PROCEDURE — 86300 IMMUNOASSAY TUMOR CA 15-3: CPT | Performed by: INTERNAL MEDICINE

## 2021-09-21 PROCEDURE — 258N000003 HC RX IP 258 OP 636: Performed by: INTERNAL MEDICINE

## 2021-09-21 RX ORDER — HEPARIN SODIUM (PORCINE) LOCK FLUSH IV SOLN 100 UNIT/ML 100 UNIT/ML
5 SOLUTION INTRAVENOUS EVERY 8 HOURS
Status: DISCONTINUED | OUTPATIENT
Start: 2021-09-21 | End: 2021-09-21 | Stop reason: HOSPADM

## 2021-09-21 RX ORDER — HEPARIN SODIUM (PORCINE) LOCK FLUSH IV SOLN 100 UNIT/ML 100 UNIT/ML
5 SOLUTION INTRAVENOUS ONCE
Status: COMPLETED | OUTPATIENT
Start: 2021-09-21 | End: 2021-09-21

## 2021-09-21 RX ADMIN — SODIUM CHLORIDE 250 ML: 9 INJECTION, SOLUTION INTRAVENOUS at 13:07

## 2021-09-21 RX ADMIN — Medication 5 ML: at 12:15

## 2021-09-21 RX ADMIN — Medication 5 ML: at 14:08

## 2021-09-21 RX ADMIN — TRASTUZUMAB 450 MG: 150 INJECTION, POWDER, LYOPHILIZED, FOR SOLUTION INTRAVENOUS at 13:35

## 2021-09-21 ASSESSMENT — PAIN SCALES - GENERAL: PAINLEVEL: NO PAIN (0)

## 2021-09-21 NOTE — NURSING NOTE
Chief Complaint   Patient presents with     Port Draw     Labs drawn via port by RN in lab. VS taken.      Port accessed with 20 gauge 3/4 inch gripper needle and labs drawn by rn.  Port flushed with saline and heparin.  Pt tolerated well.  VS taken.  Pt checked in for next appt.    Ashlyn Wilson RN

## 2021-09-21 NOTE — PROGRESS NOTES
Infusion Nursing Note:  Hoda Brush presents today for Cycle 69 Day 1 trastuzumab.    Patient seen by provider today: No   present during visit today: Not Applicable.    Note: Hoda presents today feeling well. Denies pain or nausea/vomiting. Denies fevers/chills. Denies SOB, cough, chest pain, or dizziness/lightheadedness. Denies constipation/diarrhea. Denies urinary issues. Denies neuropathy. Offers no new concerns at this appointment.      Intravenous Access:  Implanted Port.    Treatment Conditions:  Lab Results   Component Value Date    HGB 12.7 09/21/2021    WBC 7.0 09/21/2021    ANEU 3.1 06/22/2021    ANEUTAUTO 3.4 09/21/2021     09/21/2021      Lab Results   Component Value Date     09/21/2021    POTASSIUM 3.9 09/21/2021    CR 0.81 09/21/2021    TAYLER 9.0 09/21/2021    BILITOTAL 0.2 09/21/2021    ALBUMIN 3.2 (L) 09/21/2021    ALT 76 (H) 09/21/2021    AST 51 (H) 09/21/2021     Results reviewed, labs MET treatment parameters, ok to proceed with treatment.  ECHO/MUGA completed 08/30/21  EF 55-60%.      Post Infusion Assessment:  Patient tolerated infusion without incident.  Blood return noted pre and post infusion.  Site patent and intact, free from redness, edema or discomfort.  No evidence of extravasations.  Access discontinued per protocol.       Discharge Plan:   Patient declined prescription refills.  Discharge instructions reviewed with: Patient.  Patient and/or family verbalized understanding of discharge instructions and all questions answered.  AVS to patient via [employer].  Patient will return 10/12 for next infusion appointment.   Patient discharged in stable condition accompanied by: self.  Departure Mode: Ambulatory.      Olivia Bains RN

## 2021-09-21 NOTE — TELEPHONE ENCOUNTER
Last prescribing provider: Caryn Wheeler     Last clinic visit date: 8/31/21 Dr Aguiar     Any missed appointments or no-shows since last clinic visit?: No     Recommendations for requested medication (if none, N/A): Copied from chart note 8/31/21 Dr Stefania VILLALPANDO  Restart Xgeva 6-22-21. Continuing the trastuzumab and letrozole. Both tolerated well.       Next clinic visit date: 10/12/21 Dr Aguiar     Any other pertinent information (if none, N/A): N/A

## 2021-09-21 NOTE — PATIENT INSTRUCTIONS
HISTORY AND PHYSICAL  Patient: Sylvain De La Garza Date: 12/20/2020   male, 65 year old  Admit Date: 12/19/2020   Attending: Chano Woods MD    DATE OF SERVICE 12/20/2020  PRIMARY CARE PROVIDER:  Klarissa Huff MD   ATTENDING PHYSICIAN    Chano Woods MD  CHIEF COMPLAINT    Nausea, vomiting, belly pain  HPI    Sylvain De La Garza is a 65 year old male who presents with nausea, vomiting, watery diarrhea and diffuse abdominal pain starting at 8 am.  Had two stools and 4-5 emesis.  Denies bloody, coffee ground emesis, bloody or black stools.  Prior to that has been feeling well, eating and swallowing without difficulty.  Denies fatty food intolerance or acholic stools.  He did have sweats and shaking chills but no fever.  Denies chest pain, cough, urinary difficulties or hematuria.  No exposure to others who are ill or suspicious foods.    Received 30cc/kg bolus in ER.    On 12/04/2020, Dr. Baca performed:  1.) Robotic assisted laparoscopic recurrent paraesophageal hernia repair with toupet fundoplication  2.) Esophagogastroduodenoscopy     PAST MEDICAL & SURGICAL HISTORY    Past Medical History:   Diagnosis Date   • Branch retinal vein occlusion, right eye    • Colon polyp     Due 2015   • GERD (gastroesophageal reflux disease)     with hiatal hernia   • Hayfever    • Insomnia    • Pneumonia     as a child   • RBBB (right bundle branch block)    • Unspecified essential hypertension      Past Surgical History:   Procedure Laterality Date   • Colonoscopy  12/22/2016    Repeat 2021 Dr. Mora   • Colonoscopy w/ biopsies and polypectomy  10/08/2010    Due 10/2015   • Egd  06/2020   • Esophagogastroduodenoscopy  12/22/2016    Per Dr. Mora repeat in 5 years.   • Esophagogastroduodenoscopy transoral flex w/bx single or mult  10/08/2010    EGD with Bx   • Esophagogastroduodenoscopy transoral flex w/bx single or mult  12/06/2013    EGD with Bx.  Repeat    • Esophagus surgery  04/05/2019    Robotic assisted  [facility] Triage Nurse Line: [phone]    Call triage nurse with chills and/or temperature greater than or equal to 100.4, uncontrolled nausea/vomiting, diarrhea, constipation, dizziness, shortness of breath, chest pain, bleeding, unexplained bruising, or any new/concerning symptoms, questions/concerns.     If you are having any concerning symptoms or wish to speak to a provider before your next infusion visit, please call your care coordinator or triage to notify them so we can adequately serve you.       Lab Results:  Recent Results (from the past 12 hour(s))   Comprehensive metabolic panel    Collection Time: 09/21/21 12:21 PM   Result Value Ref Range    Sodium 143 133 - 144 mmol/L    Potassium 3.9 3.4 - 5.3 mmol/L    Chloride 109 94 - 109 mmol/L    Carbon Dioxide (CO2) 30 20 - 32 mmol/L    Anion Gap 4 3 - 14 mmol/L    Urea Nitrogen 11 7 - 30 mg/dL    Creatinine 0.81 0.52 - 1.04 mg/dL    Calcium 9.0 8.5 - 10.1 mg/dL    Glucose 118 (H) 70 - 99 mg/dL    Alkaline Phosphatase 58 40 - 150 U/L    AST 51 (H) 0 - 45 U/L    ALT 76 (H) 0 - 50 U/L    Protein Total 7.6 6.8 - 8.8 g/dL    Albumin 3.2 (L) 3.4 - 5.0 g/dL    Bilirubin Total 0.2 0.2 - 1.3 mg/dL    GFR Estimate 76 >60 mL/min/1.73m2   CBC with platelets and differential    Collection Time: 09/21/21 12:21 PM   Result Value Ref Range    WBC Count 7.0 4.0 - 11.0 10e3/uL    RBC Count 4.36 3.80 - 5.20 10e6/uL    Hemoglobin 12.7 11.7 - 15.7 g/dL    Hematocrit 41.1 35.0 - 47.0 %    MCV 94 78 - 100 fL    MCH 29.1 26.5 - 33.0 pg    MCHC 30.9 (L) 31.5 - 36.5 g/dL    RDW 13.5 10.0 - 15.0 %    Platelet Count 247 150 - 450 10e3/uL    % Neutrophils 47 %    % Lymphocytes 42 %    % Monocytes 6 %    % Eosinophils 4 %    % Basophils 1 %    % Immature Granulocytes 0 %    NRBCs per 100 WBC 0 <1 /100    Absolute Neutrophils 3.4 1.6 - 8.3 10e3/uL    Absolute Lymphocytes 2.9 0.8 - 5.3 10e3/uL    Absolute Monocytes 0.4 0.0 - 1.3 10e3/uL    Absolute Eosinophils 0.3 0.0 -  0.7 10e3/uL    Absolute Basophils 0.0 0.0 - 0.2 10e3/uL    Absolute Immature Granulocytes 0.0 <=0.0 10e3/uL    Absolute NRBCs 0.0 10e3/uL        Laparoscopic Paraesophageal Hernia Repair with Toupet Fundoplication - Dr. Baca   • Excis tendon sheath lesn,hand/fingr  03/13/2008   • Excision of lingual tonsil     • Ganglion cyst excision Left 02/22/2018    also soft tissue mass excision right forearm   • Laparoscopic appendectomy  07/14/2016    [facility]   • Lasik surgery Left 01/01/2012    Doctor in [facility]   • Upper gi endoscopy,exam N/A 04/05/2019    Dr. Baca       CURRENT MEDICATIONS- medication list and allergies personally reviewed in epic  Medications Prior to Admission   Medication Sig Dispense Refill   • acetaminophen (TYLENOL) 500 MG tablet Take 1,000 mg by mouth every 6 hours as needed for Pain.     • Loratadine (CLARITIN PO) Take 1 tablet by mouth daily as needed.     • HYDROcodone-acetaminophen (NORCO) 5-325 MG per tablet Take 1 tablet by mouth every 6 hours as needed for Pain. 15 tablet 0   • celecoxib (CeleBREX) 200 MG capsule Take 1 capsule by mouth 2 times daily. 5 capsule 0   • ondansetron (Zofran) 4 MG tablet Take 1 tablet by mouth every 6 hours as needed for Nausea. 30 tablet 3   • prochlorperazine (COMPAZINE) 10 MG tablet Take 1 tablet by mouth every 6 hours as needed for Nausea. 20 tablet 0   • zolpidem (AMBIEN) 10 MG tablet Take 1 tablet by mouth nightly as needed for Sleep. 30 tablet 0   • lisinopril (ZESTRIL) 10 MG tablet Take 1 tablet by mouth daily. 90 tablet 3   • PARoxetine (PAXIL) 20 MG tablet Take 1 tablet by mouth daily. 90 tablet 3   • fluticasone (FLONASE) 50 MCG/ACT nasal spray Spray 1 spray in each nostril daily. 48 g 3   • CARBOXYMethylcellulose (REFRESH PLUS) 0.5 % ophthalmic solution Place 1 drop into both eyes 4 times daily as needed for Dry Eyes.     • NON FORMULARY [place] Capsule   Take 2 capsules daily     • Multiple Vitamins-Minerals (VITAMIN - THERAPEUTIC MULTIVITAMINS W/MINERALS) Tab Take 1 tablet by mouth daily.     • diphenhydramine-acetaminophen (TYLENOL PM EXTRA STRENGTH)  MG Tab Take  2 tablets by mouth nightly as needed.     • fexofenadine (ALLEGRA) 180 MG tablet Take 180 mg by mouth daily as needed.     • aspirin 81 MG tablet Take 81 mg by mouth daily.     • ibuprofen (MOTRIN) 200 MG tablet Take 400 mg by mouth every 6 hours as needed for Pain.      • Magnesium 400 MG Tab Take 1 tablet by mouth daily.      • Cholecalciferol (VITAMIN D3) 1000 UNITS capsule Take 1,000 Units by mouth daily.      • ascorbic acid (VITAMIN C) 250 MG tablet Take 250 mg by mouth daily.       ALLERGIES  ALLERGIES:   Allergen Reactions   • Dust    • Hay Fever & [Chlorpheniramine-Ppa Cr]    • Citalopram NAUSEA     SOCIAL HISTORY    Social History     Tobacco Use   • Smoking status: Never Smoker   • Smokeless tobacco: Never Used   Substance Use Topics   • Alcohol use: Yes     Alcohol/week: 10.0 - 12.0 standard drinks     Types: 10 - 12 Cans of beer per week     Frequency: Never     Drinks per session: 1 or 2     Binge frequency: Never     Comment: 1-2 beers a night   • Drug use: No     Comment: NONE      FAMILY HISTORY    Family History   Problem Relation Age of Onset   • Cataracts Mother    • Depression Father    • High blood pressure Father    • Depression Brother    • Glaucoma Brother    • Cancer Maternal Grandmother    • Cancer Maternal Grandfather    • NEGATIVE FAMILY HX OF Other         GI/Colon Cancer     REVIEW OF SYSTEMS    All 14 Systems were reviewed and found to be negative except what's mentioned in HPI    PHYSICAL EXAMINATION    Vital 24 Hour Range Most Recent Value   Temperature Temp  Min: 97.8 °F (36.6 °C)  Max: 98.2 °F (36.8 °C) 98.2 °F (36.8 °C)   Pulse Pulse  Min: 78  Max: 98 98   Respiratory Resp  Min: 17  Max: 24 18   Blood Pressure BP  Min: 115/57  Max: 157/84 (!) 150/79   Pulse Oximetry SpO2  Min: 99 %  Max: 100 % 99 %   General:  Alert & Oriented X 3 in no acute distress; mood and affect appropriate for situation; good insight; well nourished/well developed  Head/ENT:                Normocephalic/atraumatic; pupils are equal, round and reactive to light & accommodation; oral/nasal mucosa moist and intact without lesions; dentition intact; hearing grossly intact.  Neck:   Supple; trachea is midline with no cervical or supraclavicular lymphadenopathy; no thyromegaly  Respiratory:  Clear to auscultation bilaterally; no use of accessory muscles;   Cardiovascular: regular S1S2; no jugular venous distention; no Carotid Bruit  Abdomen:  Soft; nontender/nondistended;  + bowel sounds; No guarding or rebound; No peritoneal signs; no hepatosplenomegaly.  Negative Randall's.  Musculoskeletal: Good range of motion in all 4 extremities with no clubbing or cyanosis  Extremities/Skin: cyanosis absent; warm;  no obvious lesions or wounds noted; no edema.   Neurologic:   Cranial Nerves 2-12 grossly intact , strength I.s bilaterally intact; sensation is grossly intact throughout      CT ABDOMEN PELVIS W CONTRAST   Final Result   IMPRESSION:   No acute intraabdominal/pelvic abnormality.   No evidence for appendicitis, diverticulitis, urinary or intestinal   obstruction.       XR Chest AP or PA   Final Result   IMPRESSION:        No active disease in the chest.                LABS  Results for orders placed or performed during the hospital encounter of 12/19/20   Comprehensive Metabolic Panel   Result Value    Fasting Status     Sodium 133 (L)    Potassium 3.4    Chloride 97 (L)    Carbon Dioxide 14 (L)     Comment: Result confirmed by repeat testing.    Anion Gap 25 (H)    Glucose 185 (H)    BUN 13    Creatinine 0.92    Glomerular Filtration Rate 88 (L)     Comment: eGFR 60 - 89 mL/min/1.73m2 = Mild decrease in kidney function.    BUN/ Creatinine Ratio 14    Calcium 9.5    Bilirubin, Total 0.9    GOT/AST 31    GPT/ALT 53    Alkaline Phosphatase 78    Albumin 4.4    Protein, Total 8.4 (H)    Globulin 4.0    A/G Ratio 1.1   Troponin I Ultra Sensitive   Result Value    Troponin I, Ultra Sensitive <0.02   NT proBNP    Result Value    NT-proBNP 113   Lactic Acid, Venous   Result Value    Lactate, Venous 5.5 (HH)   D Dimer, Quantitative   Result Value    D Dimer, Quantitative 0.25   Urinalysis & Reflex Microscopy With Culture If Indicated   Result Value    COLOR, URINALYSIS Yellow    APPEARANCE, URINALYSIS Clear    GLUCOSE, URINALYSIS Negative    BILIRUBIN, URINALYSIS Negative    KETONES, URINALYSIS 15   (A)    SPECIFIC GRAVITY, URINALYSIS 1.010    OCCULT BLOOD, URINALYSIS Negative    PH, URINALYSIS 6.5    PROTEIN, URINALYSIS Negative    UROBILINOGEN, URINALYSIS 0.2    NITRITE, URINALYSIS Negative    LEUKOCYTE ESTERASE, URINALYSIS Negative    SQUAMOUS EPITHELIAL, URINALYSIS None Seen    ERYTHROCYTES, URINALYSIS None Seen    LEUKOCYTES, URINALYSIS None Seen    BACTERIA, URINALYSIS None Seen    HYALINE CASTS, URINALYSIS None Seen   CBC with Automated Differential (performable only)   Result Value    WBC 11.4 (H)    RBC 5.12    HGB 16.3    HCT 46.1    MCV 90.0    MCH 31.8    MCHC 35.4    RDW-CV 11.9         Comment: Platelet count verified by smear review    NRBC 0    Neutrophil, Percent 71    Lymphocytes, Percent 21    Mono, Percent 6    Eosinophils, Percent 0    Basophils, Percent 1    Immature Granulocytes 1    Absolute Neutrophils 8.1 (H)    Absolute Lymphocytes 2.4    Absolute Monocytes 0.7    Absolute Eosinophils  0.0    Absolute Basophils 0.1    Absolute Immmature Granulocytes 0.1    RDW-SD 39.5   Prothrombin Time   Result Value    Prothrombin Time 11.4    INR 1.1     Comment: INR Therapeutic Range: 2.0 to 3.0 (2.5 to 3.5 recommended for recurrent thrombotic episodes and mechanical prosthetic heart valves.)   Partial Thromboplastin Time   Result Value    PTT 24   Lactic Acid, Venous   Result Value    Lactate, Venous 3.0 (HH)   Lactic Acid, Venous   Result Value    Lactate, Venous 2.1 (HH)   Lipase   Result Value    Lipase 97   Procalcitonin   Result Value    Procalcitonin <0.05     Comment:   Bacterial Sepsis:  <0.5  ng/mL:    Sepsis not likely. Localized bacterial infection possible.  0.5 - 2 ng/mL: Sepsis or other conditions possible  >2.0 ng/mL:    High risk of sepsis/septic shock    NOTE: If bacterial sepsis is of high clinical suspicion but PCT<2 ng/mL,  consider recheck PCT 6-24 hours after initial test.     Lower Respiratory Tract Infection (LRTI):  <0.1 ng/mL:       Bacterial infection highly unlikely  0.1 - 0.25 ng/mL: Bacterial infection unlikely  0.26 - 0.5 ng/mL: Bacterial infection likely  > 0.5 ng/mL:      Bacterial infection highly likely    NOTE: Patients with advanced CKD or medical/surgical trauma may have  elevated baseline PCT (>0.5 ng/mL) in the absence of bacterial infection. If  bacterial infection is suspected, consider repeat PCT in 2 to 4 days to guide de-escalation or discontinuation of antibiotic therapy.  Higher reference range cutoffs may be appropriate for patients <3 days old.     Electrocardiogram 12-Lead   Result Value    Systolic Blood Pressure 115    Diastolic Blood Pressure 57    Ventricular Rate EKG/Min (BPM) 87    Atrial Rate (BPM) 87    TX-Interval (MSEC) 168    QRS-Interval (MSEC) 144    QT-Interval (MSEC) 476    QTc 573    P Axis (Degrees) 77    R Axis (Degrees) 85    T Axis (Degrees) 58    REPORT TEXT      Sinus rhythm  with occasional  premature ventricular complexes  Right bundle branch block  Abnormal ECG  When compared with ECG of  04-DEC-2020 06:08,  premature ventricular complexes  are now  present  QT has lengthened  Confirmed by RYAN GUERRIER, KEN ([id number]),  Shine Palafox ([id number]) on 12/19/2020 5:58:07 PM     Rapid SARS-CoV-2 by PCR    Specimen: Nasopharyngeal; Swab   Result Value    Rapid SARS-COV-2 by PCR Not Detected    Isolation Guidelines      Comment: Do not use this test result as the sole decision-maker for discontinuation of isolation.   Clinical evaluation should be considered for other respiratory illness requiring transmission-based isolation.    •       No fever  (<99.0 F/37.2 C) for at least 24 hours without the use of fever-reducing medications    AND  •       Respiratory symptoms have improved or resolved (e.g. cough, shortness of breath)     AND  •       COVID-19 negative test    See COVID-19 Deisolation Resource Guide    Procedural Comment      Comment: SARS-CoV-2 nucleic acid has not been detected indicating the absence of COVID-19.       Testing was performed using the [employer] Xpert Xpress SARS-CoV-2 RT-PCR assay that has been given Emergency Use Authorization (EUA) by the [place] Food and Drug Administration (FDA).  These results are considered definitive and do not need to be confirmed by another method.         EKG- PER MY PERSONAL REVIEW - NSR with RBBB old.    CODE STATUS- Do Not Resuscitate    ASSESSMENT & PLAN:    · Abdominal Pain with nausea, vomiting and diarrhea- DDx includes infectious, ischemic bowel, less likely biliary or urinary etiology given current findings.  No history of hypotension.  Admit to observation for IVF, monitor, antibiotics if clinically indicated.      · Lactic Acidosis- Unknown etiology with symptoms pointing toward abdominal etiology.  Infectious markers not strongly indicating infection at this time.  Monitor for ischemic bowel.  Likely Type B lactic acidosis given current findings.    · HTN- controlled.    · GERD- s/p Robotic assisted laparoscopic recurrent paraesophageal hernia repair with toupet fundoplication        · DVT Prophylaxis-Lovenox and SCDs    · Disposition- observation    Is the patient expected to require a two midnight stay in the hospital? No    Chano Woods MD  12/20/2020  12:18 AM

## 2021-09-28 RX ORDER — LETROZOLE 2.5 MG/1
TABLET, FILM COATED ORAL
Qty: 90 TABLET | Refills: 3 | Status: SHIPPED | OUTPATIENT
Start: 2021-09-28 | End: 2022-10-07

## 2021-10-04 ENCOUNTER — HOSPITAL ENCOUNTER (EMERGENCY)
Facility: CLINIC | Age: 66
Discharge: HOME | End: 2021-10-04
Attending: EMERGENCY MEDICINE | Admitting: EMERGENCY MEDICINE
Payer: COMMERCIAL

## 2021-10-04 ENCOUNTER — APPOINTMENT (OUTPATIENT)
Dept: ULTRASOUND IMAGING | Facility: CLINIC | Age: 66
End: 2021-10-04
Attending: EMERGENCY MEDICINE
Payer: COMMERCIAL

## 2021-10-04 ENCOUNTER — APPOINTMENT (OUTPATIENT)
Dept: CT IMAGING | Facility: CLINIC | Age: 66
End: 2021-10-04
Attending: EMERGENCY MEDICINE
Payer: COMMERCIAL

## 2021-10-04 VITALS
RESPIRATION RATE: 15 BRPM | SYSTOLIC BLOOD PRESSURE: 140 MMHG | OXYGEN SATURATION: 97 % | HEART RATE: 82 BPM | TEMPERATURE: 98.5 F | WEIGHT: 180 LBS | DIASTOLIC BLOOD PRESSURE: 98 MMHG | BODY MASS INDEX: 31.9 KG/M2

## 2021-10-04 DIAGNOSIS — K85.90 ACUTE PANCREATITIS WITHOUT INFECTION OR NECROSIS, UNSPECIFIED PANCREATITIS TYPE: ICD-10-CM

## 2021-10-04 LAB
ALBUMIN SERPL-MCNC: 3.3 G/DL (ref 3.4–5)
ALP SERPL-CCNC: 100 U/L (ref 40–150)
ALT SERPL W P-5'-P-CCNC: 429 U/L (ref 0–50)
ANION GAP SERPL CALCULATED.3IONS-SCNC: 7 MMOL/L (ref 3–14)
AST SERPL W P-5'-P-CCNC: 536 U/L (ref 0–45)
BASOPHILS # BLD AUTO: 0 10E3/UL (ref 0–0.2)
BASOPHILS NFR BLD AUTO: 0 %
BILIRUB SERPL-MCNC: 2.2 MG/DL (ref 0.2–1.3)
BUN SERPL-MCNC: 10 MG/DL (ref 7–30)
CALCIUM SERPL-MCNC: 8.9 MG/DL (ref 8.5–10.1)
CHLORIDE BLD-SCNC: 108 MMOL/L (ref 94–109)
CO2 SERPL-SCNC: 26 MMOL/L (ref 20–32)
CREAT SERPL-MCNC: 0.77 MG/DL (ref 0.52–1.04)
EOSINOPHIL # BLD AUTO: 0.2 10E3/UL (ref 0–0.7)
EOSINOPHIL NFR BLD AUTO: 2 %
ERYTHROCYTE [DISTWIDTH] IN BLOOD BY AUTOMATED COUNT: 13.4 % (ref 10–15)
GFR SERPL CREATININE-BSD FRML MDRD: 81 ML/MIN/1.73M2
GLUCOSE BLD-MCNC: 98 MG/DL (ref 70–99)
HCT VFR BLD AUTO: 38.9 % (ref 35–47)
HGB BLD-MCNC: 12.2 G/DL (ref 11.7–15.7)
HOLD SPECIMEN: NORMAL
IMM GRANULOCYTES # BLD: 0 10E3/UL
IMM GRANULOCYTES NFR BLD: 0 %
LIPASE SERPL-CCNC: 4971 U/L (ref 73–393)
LYMPHOCYTES # BLD AUTO: 1.9 10E3/UL (ref 0.8–5.3)
LYMPHOCYTES NFR BLD AUTO: 20 %
MCH RBC QN AUTO: 29.5 PG (ref 26.5–33)
MCHC RBC AUTO-ENTMCNC: 31.4 G/DL (ref 31.5–36.5)
MCV RBC AUTO: 94 FL (ref 78–100)
MONOCYTES # BLD AUTO: 0.6 10E3/UL (ref 0–1.3)
MONOCYTES NFR BLD AUTO: 7 %
NEUTROPHILS # BLD AUTO: 6.5 10E3/UL (ref 1.6–8.3)
NEUTROPHILS NFR BLD AUTO: 71 %
NRBC # BLD AUTO: 0 10E3/UL
NRBC BLD AUTO-RTO: 0 /100
PLATELET # BLD AUTO: 183 10E3/UL (ref 150–450)
POTASSIUM BLD-SCNC: 3.8 MMOL/L (ref 3.4–5.3)
PROT SERPL-MCNC: 7.8 G/DL (ref 6.8–8.8)
RBC # BLD AUTO: 4.14 10E6/UL (ref 3.8–5.2)
SODIUM SERPL-SCNC: 141 MMOL/L (ref 133–144)
TROPONIN I SERPL-MCNC: <0.015 UG/L (ref 0–0.04)
WBC # BLD AUTO: 9.3 10E3/UL (ref 4–11)

## 2021-10-04 PROCEDURE — 250N000011 HC RX IP 250 OP 636: Performed by: EMERGENCY MEDICINE

## 2021-10-04 PROCEDURE — 96374 THER/PROPH/DIAG INJ IV PUSH: CPT | Mod: 59

## 2021-10-04 PROCEDURE — C9113 INJ PANTOPRAZOLE SODIUM, VIA: HCPCS | Performed by: EMERGENCY MEDICINE

## 2021-10-04 PROCEDURE — 76705 ECHO EXAM OF ABDOMEN: CPT

## 2021-10-04 PROCEDURE — 36415 COLL VENOUS BLD VENIPUNCTURE: CPT | Performed by: EMERGENCY MEDICINE

## 2021-10-04 PROCEDURE — 250N000009 HC RX 250: Performed by: EMERGENCY MEDICINE

## 2021-10-04 PROCEDURE — 99285 EMERGENCY DEPT VISIT HI MDM: CPT | Mod: 25

## 2021-10-04 PROCEDURE — 96375 TX/PRO/DX INJ NEW DRUG ADDON: CPT

## 2021-10-04 PROCEDURE — 74177 CT ABD & PELVIS W/CONTRAST: CPT

## 2021-10-04 PROCEDURE — 85025 COMPLETE CBC W/AUTO DIFF WBC: CPT | Performed by: EMERGENCY MEDICINE

## 2021-10-04 PROCEDURE — 83690 ASSAY OF LIPASE: CPT | Performed by: EMERGENCY MEDICINE

## 2021-10-04 PROCEDURE — 84484 ASSAY OF TROPONIN QUANT: CPT | Performed by: EMERGENCY MEDICINE

## 2021-10-04 PROCEDURE — 250N000013 HC RX MED GY IP 250 OP 250 PS 637: Performed by: EMERGENCY MEDICINE

## 2021-10-04 PROCEDURE — 82374 ASSAY BLOOD CARBON DIOXIDE: CPT | Performed by: EMERGENCY MEDICINE

## 2021-10-04 PROCEDURE — 93005 ELECTROCARDIOGRAM TRACING: CPT

## 2021-10-04 RX ORDER — IOPAMIDOL 755 MG/ML
91 INJECTION, SOLUTION INTRAVASCULAR ONCE
Status: COMPLETED | OUTPATIENT
Start: 2021-10-04 | End: 2021-10-04

## 2021-10-04 RX ORDER — OXYCODONE AND ACETAMINOPHEN 5; 325 MG/1; MG/1
1 TABLET ORAL EVERY 6 HOURS PRN
Qty: 15 TABLET | Refills: 0 | Status: SHIPPED | OUTPATIENT
Start: 2021-10-04 | End: 2021-11-23

## 2021-10-04 RX ORDER — HYDROMORPHONE HYDROCHLORIDE 1 MG/ML
0.3 INJECTION, SOLUTION INTRAMUSCULAR; INTRAVENOUS; SUBCUTANEOUS
Status: DISCONTINUED | OUTPATIENT
Start: 2021-10-04 | End: 2021-10-05 | Stop reason: HOSPADM

## 2021-10-04 RX ORDER — HEPARIN SODIUM (PORCINE) LOCK FLUSH IV SOLN 100 UNIT/ML 100 UNIT/ML
500 SOLUTION INTRAVENOUS ONCE
Status: COMPLETED | OUTPATIENT
Start: 2021-10-04 | End: 2021-10-04

## 2021-10-04 RX ORDER — ONDANSETRON 4 MG/1
4 TABLET, ORALLY DISINTEGRATING ORAL EVERY 6 HOURS PRN
Qty: 12 TABLET | Refills: 0 | Status: SHIPPED | OUTPATIENT
Start: 2021-10-04 | End: 2021-10-07

## 2021-10-04 RX ADMIN — LIDOCAINE HYDROCHLORIDE 30 ML: 20 SOLUTION ORAL; TOPICAL at 19:22

## 2021-10-04 RX ADMIN — SODIUM CHLORIDE 66 ML: 900 INJECTION INTRAVENOUS at 20:43

## 2021-10-04 RX ADMIN — HYDROMORPHONE HYDROCHLORIDE 0.3 MG: 1 INJECTION, SOLUTION INTRAMUSCULAR; INTRAVENOUS; SUBCUTANEOUS at 21:26

## 2021-10-04 RX ADMIN — PANTOPRAZOLE SODIUM 40 MG: 40 INJECTION, POWDER, FOR SOLUTION INTRAVENOUS at 19:40

## 2021-10-04 RX ADMIN — IOPAMIDOL 91 ML: 755 INJECTION, SOLUTION INTRAVENOUS at 20:42

## 2021-10-04 RX ADMIN — HEPARIN SODIUM (PORCINE) LOCK FLUSH IV SOLN 100 UNIT/ML 500 UNITS: 100 SOLUTION at 22:23

## 2021-10-04 ASSESSMENT — ENCOUNTER SYMPTOMS
VOMITING: 0
ABDOMINAL PAIN: 1
DIARRHEA: 0
NAUSEA: 0
ABDOMINAL DISTENTION: 1
CONSTIPATION: 0

## 2021-10-04 NOTE — ED TRIAGE NOTES
Pt presents with abdominal pain and bloating. Pt reports pain present since yesterday and not resolved by her normal regimen of tylenol, ibuprofen and tums. Pt is an oncology patient and has breast CA with mets to the spine. Pt denies fever/chills

## 2021-10-05 LAB
ATRIAL RATE - MUSE: 78 BPM
DIASTOLIC BLOOD PRESSURE - MUSE: NORMAL MMHG
INTERPRETATION ECG - MUSE: NORMAL
P AXIS - MUSE: 28 DEGREES
PR INTERVAL - MUSE: 216 MS
QRS DURATION - MUSE: 68 MS
QT - MUSE: 362 MS
QTC - MUSE: 412 MS
R AXIS - MUSE: 8 DEGREES
SYSTOLIC BLOOD PRESSURE - MUSE: NORMAL MMHG
T AXIS - MUSE: 41 DEGREES
VENTRICULAR RATE- MUSE: 78 BPM

## 2021-10-05 NOTE — ED PROVIDER NOTES
History   Chief Complaint:  Abdominal Pain     HPI   Hoda Brush is a 66 year old female with history of breast cancer with metastasis to the spine who presents with abdominal pain. The patient's daughter reports that about once a month the patient gets an abdominal pain and bloating. She was first put on Omeprazole and then Famotidine. She began experiencing the abdominal pain and bloating yesterday and took her regular pain regimen of Tylenol, Ibuprofen and Tums but it has not helped. The pain is exacerbated while standing and slightly alleviated while laying down. The pain is currently about an 8/10. It is similar to previous episodes. She denies nausea, vomiting or chest pain. She has had no bowel changes.     Review of Systems   Cardiovascular: Negative for chest pain.   Gastrointestinal: Positive for abdominal distention and abdominal pain. Negative for constipation, diarrhea, nausea and vomiting.   All other systems reviewed and are negative.      Allergies:  Zometa [Zoledronic Acid]    Medications:  Senna-docusate sodium  Femara  Pepcid    Past Medical History:     Breast cancer  Lymphedema of left upper extremity  Bone metastasis    Past Surgical History:    Appendectomy   Port vascular access  Mastectomy    Family History:    Mother- breast cancer  Father- lung cancer  Sister- breast cancer    Social History:  Presents with her daughter  Speaks Anguillan    Physical Exam     Patient Vitals for the past 24 hrs:   BP Temp Temp src Pulse Resp SpO2 Weight   10/04/21 2126 -- -- -- 82 15 97 % --   10/04/21 2119 (!) 140/98 -- -- 85 -- -- --   10/04/21 1923 -- -- -- 84 18 96 % --   10/04/21 1921 137/69 -- -- 81 -- -- --   10/04/21 1558 (!) 151/75 98.5  F (36.9  C) Oral 79 20 95 % 81.6 kg (180 lb)       Physical Exam  General/Appearance: appears stated age, well-groomed, appears comfortable  Eyes: EOMI, no scleral injection, no icterus  ENT: MMM  Neck: supple, nl ROM, no stiffness  Cardiovascular: RRR, nl  S1S2, no m/r/g, 2+ pulses in all 4 extremities, cap refill <2sec  Respiratory: CTAB, good air movement throughout, no wheezes/rhonchi/rales, no increased WOB, no retractions  Back: no lesions  GI: abd soft, non-distended, mild epigastric ttp,  no HSM, no rebound, no guarding, nl BS  MSK: ALVARENGA, good tone, no bony abnormality  Skin: warm and well-perfused, no rash, no edema, no ecchymosis, nl turgor  Neuro: GCS 15, alert and oriented, no gross focal neuro deficits  Psych: interacts appropriately  Heme: no petechia, no purpura, no active bleeding    Emergency Department Course   ECG  ECG obtained at 1944, ECG read at 1948  Abdominal pain    No prior EKG.  Rate 78 bpm. NH interval 216 ms. QRS duration 68 ms. QT/QTc 362/412 ms. P-R-T axes 28 8 41. Sinus rhythm with 1st degree AV block.      Imaging:  CT Abdomen Pelvis w Contrast  1.  Mild extrahepatic biliary dilatation without a definite calcified gallstone present. This is of etiology indeterminate. MRCP may be of further use in characterizing this.   2.  Otherwise no acute process demonstrated in the abdomen and pelvis.  As read by Radiology.      US Abdomen Limited:  1.  Dense hepatic steatosis.   2.  Biliary dilatation as noted on CT of unclear etiology.   As read by Radiology.      Laboratory:  CBC: WBC 9.3, HGB 12.2,   CMP:  (HH),  (H), albumin 3.3 (L), bilirubin 2.2 (H) o/w WNL (Creatinine 0.77)  Lipase: 4,971 (H)    Troponin (Collected 1936): <0.015     Emergency Department Course:  Reviewed:  I reviewed nursing notes, vitals, past medical history and Care Everywhere    Assessments:  1908 I obtained history and examined the patient as noted above.   2104 I rechecked the patient and explained findings.   2207 I rechecked the patient again.     Interventions:  1922: GI Cocktail - Maalox 15 mL, Viscous Lidocaine 15 mL, 30 mL suspension PO    1940: Protonix 40 mg mL IV    2126: Dilaudid 0.3 mg IV     Disposition:  The patient was discharged to  home.     Impression & Plan     Medical Decision Making:  This patient is a 57-year-old male with metastatic breast cancer who presents with upper abdominal pain. She has had this pain before and states that it was treated basically with antacids. She did have some tenderness to palpation in the epigastrium so I was more concerned for something like a bowel obstruction, pancreatitis. Labs were obtained which do show an elevated lipase, elevated LFTs, elevated bilirubin. Of note she is remotely status post cholecystectomy. I doubt retained stone. CT showed no necrosis or pseudocyst. I discussed options regarding treatment with the patient. I offered her admission but also said I would happily discharge her with pain meds and nausea meds at this which she preferred. She is vitally stable so I think it is reasonable for her to be discharged. This is ultimately which she opted to do. She will be sent home with Percocet, Zofran, and instructions to follow-up with GI.    Diagnosis:    ICD-10-CM    1. Acute pancreatitis without infection or necrosis, unspecified pancreatitis type  K85.90        Discharge Medications:  New Prescriptions    ONDANSETRON (ZOFRAN ODT) 4 MG ODT TAB    Take 1 tablet (4 mg) by mouth every 6 hours as needed for nausea or vomiting    OXYCODONE-ACETAMINOPHEN (PERCOCET) 5-325 MG TABLET    Take 1 tablet by mouth every 6 hours as needed for moderate to severe pain       Scribe Disclosure:  I, Marilee Sifuentes, am serving as a scribe at 7:02 PM on 10/4/2021 to document services personally performed by Steffi Alva MD based on my observations and the provider's statements to me.             Steffi Alva MD  10/04/21 [id number]

## 2021-10-07 ENCOUNTER — TELEPHONE (OUTPATIENT)
Dept: ONCOLOGY | Facility: CLINIC | Age: 66
End: 2021-10-07

## 2021-10-07 ENCOUNTER — PATIENT OUTREACH (OUTPATIENT)
Dept: GERIATRIC MEDICINE | Facility: CLINIC | Age: 66
End: 2021-10-07

## 2021-10-07 ENCOUNTER — PATIENT OUTREACH (OUTPATIENT)
Dept: ONCOLOGY | Facility: CLINIC | Age: 66
End: 2021-10-07

## 2021-10-07 DIAGNOSIS — C50.912 MALIGNANT NEOPLASM OF LEFT FEMALE BREAST, UNSPECIFIED ESTROGEN RECEPTOR STATUS, UNSPECIFIED SITE OF BREAST (H): Primary | ICD-10-CM

## 2021-10-07 DIAGNOSIS — K83.1 BILIARY OBSTRUCTION (H): ICD-10-CM

## 2021-10-07 NOTE — PROGRESS NOTES
Spoke to patient's daughter Shyla with Dr Aguiar recommending an appointment tomorrow with lab work and see Jamee Mckeon in the AM. Message sent to scheduling to arrange the appointments.  Answered all patient's daughter's questions and verbalized understanding. Cortney Ramos RN, BSN.

## 2021-10-07 NOTE — LETTER
October 7, 2021    NATASHA LOUIS  [address]   [facility] [address]      Dear Natasha:    As a member of [facility] you are provided a care coordinator. I will be your new care coordinator as of 10/01/2021. I will be calling you soon to see how you are doing and determine your needs.    If you have any questions, please feel free to call me at [phone]. If you reach my voice mail, please leave a message and your phone number. If you are hearing impaired, please call the [place] [employer] at 454 or [phone] ([id number] relay service).    I look forward to speaking with you soon.    Sincerely,        Lory Arriaga RN, MA    E-mail: [email or web address]  Phone: [phone]      [facility]          [facility]  [id number] DHS Approved ([id number])  [id number] (11/18)

## 2021-10-07 NOTE — PROGRESS NOTES
[facility] Care Coordination Contact    Member became effective with  [employer] on 10/1/2021 with [facility] MSC[facility]  Previous Health Plan: [facility] MSC+  Previous Care System: [facility] (per [facility] enrollment report)  Previous care coordinators name and number: no name or phone  Waiver Type: N/A  Last MMIS Entry: Date none entered at this time and Type n/a  MMIS visit date (and type) if different from above: n/a  Services Listed in MMIS: n/a  UTF received: No: Requested on 10/7/21  Emailed [facility] to request UTF.   Chey Hanley  Case Management Specialist  [facility]  [phone]    [facility] Care Coordination Contact    CC contacted member's daughter aLura on 10/13/21 to discuss role of care coordination. She stated member had chemotherapy yesterday and is often tired the day after so she suggested CC call member on 10/14 with  to discuss CC role with her and offer telephone assessment.  Lory Arriaga RN  [facility] Care Coordinator  [phone]    [facility] Care Coordination Contact    Called member to schedule annual HRA telephone visit. HRA has been scheduled for 10/20/21 at 1:30. CC will call CLI to schedule a [employer]  for the telephone assessment.   Lory Arriaga RN  [facility] Care Coordinator  [phone]

## 2021-10-07 NOTE — TELEPHONE ENCOUNTER
I called Hoda and discussed her recent visit to the [facility] emergency department for abdominal discomfort.  She was seen and evaluated and was thought to have possible biliary duct stone.  She was referred to Dr. Sauceda at [place] for presumed ERCP tomorrow.     I asked the patient how she was doing.  She has had no fever jaundice, nausea, vomiting, white stools.  She does have mild abdominal discomfort but no overt pain and the discomfort is much improved compared with Tuesday.  Looking back over her records she has had 1 prior small elevation of bilirubin which was still within the normal range and the current elevation of bilirubin is quite different in magnitude compared with that.  Nonetheless Hoda is feeling reasonably well.     I discussed with Hoda and her daughter Laura that I have reservations about having the procedure done in [place] because of care integration issues.  Hoda is stable and we have a few days to work things out and perform the procedure where we have EMR access to records and pathology.     I would prefer to have the procedure done at an [facility] location because of EMR access and integration of care.  This would allow better integration of care and communication as well as management of potential complications of the procedure. I heard about the procedure from the patient not the facility planning the procedure tomorrow. I discussed that since she does not have emergent issues and the procedure has risk of complications, I would not recommend having the procedure tomorrow before the weekend, but rather on  Monday or Tuesday at [facility] where the medical oncology staff are looped into the care plan and there is an EMR.    The patient and her daughter are in agreement and will cancel the procedure at [place] tomorrow and come to the [facility] for follow up tomorrow with KARISSA Turner and have a recheck of hepatic panel labs.  Direct bili ordered.   My phone is  [phone] and I can be called any time.    Lisandro Aguiar MD

## 2021-10-08 ENCOUNTER — LAB (OUTPATIENT)
Dept: URGENT CARE | Facility: URGENT CARE | Age: 66
End: 2021-10-08
Attending: INTERNAL MEDICINE
Payer: COMMERCIAL

## 2021-10-08 ENCOUNTER — PATIENT OUTREACH (OUTPATIENT)
Dept: GASTROENTEROLOGY | Facility: CLINIC | Age: 66
End: 2021-10-08

## 2021-10-08 ENCOUNTER — TELEPHONE (OUTPATIENT)
Dept: GASTROENTEROLOGY | Facility: CLINIC | Age: 66
End: 2021-10-08

## 2021-10-08 ENCOUNTER — APPOINTMENT (OUTPATIENT)
Dept: LAB | Facility: CLINIC | Age: 66
End: 2021-10-08
Attending: INTERNAL MEDICINE
Payer: COMMERCIAL

## 2021-10-08 ENCOUNTER — ONCOLOGY VISIT (OUTPATIENT)
Dept: ONCOLOGY | Facility: CLINIC | Age: 66
End: 2021-10-08
Attending: INTERNAL MEDICINE
Payer: COMMERCIAL

## 2021-10-08 ENCOUNTER — PREP FOR PROCEDURE (OUTPATIENT)
Dept: GASTROENTEROLOGY | Facility: CLINIC | Age: 66
End: 2021-10-08

## 2021-10-08 ENCOUNTER — HOSPITAL ENCOUNTER (OUTPATIENT)
Facility: CLINIC | Age: 66
End: 2021-10-08
Attending: INTERNAL MEDICINE | Admitting: INTERNAL MEDICINE
Payer: COMMERCIAL

## 2021-10-08 VITALS
WEIGHT: 175 LBS | TEMPERATURE: 98.2 F | HEART RATE: 92 BPM | DIASTOLIC BLOOD PRESSURE: 63 MMHG | BODY MASS INDEX: 31.01 KG/M2 | RESPIRATION RATE: 16 BRPM | SYSTOLIC BLOOD PRESSURE: 101 MMHG | OXYGEN SATURATION: 96 %

## 2021-10-08 DIAGNOSIS — R79.89 ELEVATED LFTS: Primary | ICD-10-CM

## 2021-10-08 DIAGNOSIS — Z11.59 ENCOUNTER FOR SCREENING FOR OTHER VIRAL DISEASES: Primary | ICD-10-CM

## 2021-10-08 DIAGNOSIS — R79.89 ELEVATED LFTS: ICD-10-CM

## 2021-10-08 DIAGNOSIS — C50.912 MALIGNANT NEOPLASM OF LEFT FEMALE BREAST, UNSPECIFIED ESTROGEN RECEPTOR STATUS, UNSPECIFIED SITE OF BREAST (H): Primary | ICD-10-CM

## 2021-10-08 DIAGNOSIS — Z11.59 ENCOUNTER FOR SCREENING FOR OTHER VIRAL DISEASES: ICD-10-CM

## 2021-10-08 LAB
ALBUMIN SERPL-MCNC: 3 G/DL (ref 3.4–5)
ALP SERPL-CCNC: 101 U/L (ref 40–150)
ALT SERPL W P-5'-P-CCNC: 194 U/L (ref 0–50)
ANION GAP SERPL CALCULATED.3IONS-SCNC: 9 MMOL/L (ref 3–14)
AST SERPL W P-5'-P-CCNC: 82 U/L (ref 0–45)
BASOPHILS # BLD AUTO: 0 10E3/UL (ref 0–0.2)
BASOPHILS NFR BLD AUTO: 0 %
BILIRUB DIRECT SERPL-MCNC: 0.4 MG/DL (ref 0–0.2)
BILIRUB SERPL-MCNC: 0.8 MG/DL (ref 0.2–1.3)
BUN SERPL-MCNC: 12 MG/DL (ref 7–30)
CALCIUM SERPL-MCNC: 8.6 MG/DL (ref 8.5–10.1)
CANCER AG27-29 SERPL-ACNC: 17 U/ML (ref 0–39)
CEA SERPL-MCNC: <0.5 UG/L (ref 0–2.5)
CHLORIDE BLD-SCNC: 106 MMOL/L (ref 94–109)
CO2 SERPL-SCNC: 27 MMOL/L (ref 20–32)
CREAT SERPL-MCNC: 0.81 MG/DL (ref 0.52–1.04)
EOSINOPHIL # BLD AUTO: 0.2 10E3/UL (ref 0–0.7)
EOSINOPHIL NFR BLD AUTO: 2 %
ERYTHROCYTE [DISTWIDTH] IN BLOOD BY AUTOMATED COUNT: 14.2 % (ref 10–15)
GFR SERPL CREATININE-BSD FRML MDRD: 76 ML/MIN/1.73M2
GLUCOSE BLD-MCNC: 107 MG/DL (ref 70–99)
HCT VFR BLD AUTO: 40.2 % (ref 35–47)
HGB BLD-MCNC: 12.6 G/DL (ref 11.7–15.7)
IMM GRANULOCYTES # BLD: 0 10E3/UL
IMM GRANULOCYTES NFR BLD: 0 %
LIPASE SERPL-CCNC: 544 U/L (ref 73–393)
LYMPHOCYTES # BLD AUTO: 2.6 10E3/UL (ref 0.8–5.3)
LYMPHOCYTES NFR BLD AUTO: 24 %
MCH RBC QN AUTO: 29.6 PG (ref 26.5–33)
MCHC RBC AUTO-ENTMCNC: 31.3 G/DL (ref 31.5–36.5)
MCV RBC AUTO: 94 FL (ref 78–100)
MONOCYTES # BLD AUTO: 0.7 10E3/UL (ref 0–1.3)
MONOCYTES NFR BLD AUTO: 7 %
NEUTROPHILS # BLD AUTO: 7.1 10E3/UL (ref 1.6–8.3)
NEUTROPHILS NFR BLD AUTO: 67 %
NRBC # BLD AUTO: 0 10E3/UL
NRBC BLD AUTO-RTO: 0 /100
PLATELET # BLD AUTO: 220 10E3/UL (ref 150–450)
POTASSIUM BLD-SCNC: 4 MMOL/L (ref 3.4–5.3)
PROT SERPL-MCNC: 8.2 G/DL (ref 6.8–8.8)
RBC # BLD AUTO: 4.26 10E6/UL (ref 3.8–5.2)
SODIUM SERPL-SCNC: 142 MMOL/L (ref 133–144)
WBC # BLD AUTO: 10.7 10E3/UL (ref 4–11)

## 2021-10-08 PROCEDURE — 250N000011 HC RX IP 250 OP 636: Performed by: PHYSICIAN ASSISTANT

## 2021-10-08 PROCEDURE — 82248 BILIRUBIN DIRECT: CPT | Performed by: PHYSICIAN ASSISTANT

## 2021-10-08 PROCEDURE — 36591 DRAW BLOOD OFF VENOUS DEVICE: CPT | Performed by: PHYSICIAN ASSISTANT

## 2021-10-08 PROCEDURE — 82378 CARCINOEMBRYONIC ANTIGEN: CPT | Performed by: PHYSICIAN ASSISTANT

## 2021-10-08 PROCEDURE — 99214 OFFICE O/P EST MOD 30 MIN: CPT | Performed by: PHYSICIAN ASSISTANT

## 2021-10-08 PROCEDURE — 84155 ASSAY OF PROTEIN SERUM: CPT | Performed by: PHYSICIAN ASSISTANT

## 2021-10-08 PROCEDURE — 83690 ASSAY OF LIPASE: CPT | Performed by: PHYSICIAN ASSISTANT

## 2021-10-08 PROCEDURE — U0005 INFEC AGEN DETEC AMPLI PROBE: HCPCS

## 2021-10-08 PROCEDURE — G0463 HOSPITAL OUTPT CLINIC VISIT: HCPCS

## 2021-10-08 PROCEDURE — U0003 INFECTIOUS AGENT DETECTION BY NUCLEIC ACID (DNA OR RNA); SEVERE ACUTE RESPIRATORY SYNDROME CORONAVIRUS 2 (SARS-COV-2) (CORONAVIRUS DISEASE [COVID-19]), AMPLIFIED PROBE TECHNIQUE, MAKING USE OF HIGH THROUGHPUT TECHNOLOGIES AS DESCRIBED BY CMS-2020-01-R: HCPCS

## 2021-10-08 PROCEDURE — 85025 COMPLETE CBC W/AUTO DIFF WBC: CPT | Performed by: PHYSICIAN ASSISTANT

## 2021-10-08 PROCEDURE — 86300 IMMUNOASSAY TUMOR CA 15-3: CPT | Performed by: PHYSICIAN ASSISTANT

## 2021-10-08 RX ORDER — HEPARIN SODIUM (PORCINE) LOCK FLUSH IV SOLN 100 UNIT/ML 100 UNIT/ML
5 SOLUTION INTRAVENOUS
Status: COMPLETED | OUTPATIENT
Start: 2021-10-08 | End: 2021-10-08

## 2021-10-08 RX ADMIN — Medication 5 ML: at 08:56

## 2021-10-08 ASSESSMENT — PAIN SCALES - GENERAL: PAINLEVEL: NO PAIN (0)

## 2021-10-08 NOTE — TELEPHONE ENCOUNTER
Called patient's daughter Shyla to make sure that they received the arrival time for patient's procedure on Monday 10/11/2021.     Spoke with Shyla, she did speak with PAN, and had the correct arrival time.

## 2021-10-08 NOTE — NURSING NOTE
"Chief Complaint   Patient presents with     Oncology Clinic Visit     breast cancer     Port Draw     labs drawn from port by rn.  vs taken     Port accessed with 20 gauge 3/4\" gripper needle and labs drawn by rn.  Port flushed with NS and heparin then de-accessed.  Pt tolerated well.  VS taken.  Pt checked in for next appt.    Olga Hoff RN    "

## 2021-10-08 NOTE — TELEPHONE ENCOUNTER
Called patient with  to discuss EUS/ERCP with Dr. Kate next week     Talked to daughter Shyla- Discussed procedure for next week, 10-11. Covid orders placed, scheduling number given. Is not on blood thinners. Can use Onc note from today as preop.         ML

## 2021-10-08 NOTE — NURSING NOTE
"Oncology Rooming Note    October 8, 2021 9:36 AM   Hoda Brush is a 66 year old female who presents for:    Chief Complaint   Patient presents with     Oncology Clinic Visit     breast cancer     Port Draw     labs drawn from port by rn.  vs taken     Initial Vitals: /63 (BP Location: Right arm, Patient Position: Sitting, Cuff Size: Adult Large)   Pulse 92   Temp 98.2  F (36.8  C) (Oral)   Resp 16   Wt 79.4 kg (175 lb)   SpO2 96%   BMI 31.01 kg/m   Estimated body mass index is 31.01 kg/m  as calculated from the following:    Height as of 7/20/20: 1.6 m (5' 2.99\").    Weight as of this encounter: 79.4 kg (175 lb). Body surface area is 1.88 meters squared.  No Pain (0) Comment: Data Unavailable   No LMP recorded. Patient is postmenopausal.  Allergies reviewed: Yes  Medications reviewed: Yes    Medications: Medication refills not needed today.  Pharmacy name entered into [employer]: [id number] [place], [place] - [address]    Clinical concerns: None    Patricia Devries LPN            "

## 2021-10-08 NOTE — PROGRESS NOTES
Hoda is a patient from [email or web address] and is seen here for continuation of care for her metastatic ER+HER2- breast cancer.  She is a [employer] refugee from [facility] [[email or web address] and was initially seen in clinic with her daughter.  The only data we have from [facility] is an English translation of her records.       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  On 02/11/2014, she was seen by an oncologist.  The clinical staging of T4b N2 M1 was noted.   Her breast cancer was on the left side. There was no right breast cancer, confirmed by the patient and her daughter, correcting a possible error in the translated records.  ER was positive in 100% of the cells, UT at 14% and HER2 was 3+ positive.  It appears that this histopathologic information is on the mastectomy specimen. She underwent an MRI for staging of presumptive bone metastases which was performed 03/02/2014.  There were skeletal metastases in the thoracic vertebrae at 12, L1, L3, L5 and S1 vertebral body, ranging in size from 0.7-3.0 cm in size.  Ischial and right femur were also involved, as well as a large iliac mass measuring about 15 cm in the uterus. 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   Radiation Oncology consultation was performed 03/11/2014, and she was given radiation in 1 dose of 6 Gy to the right iliac lesion.        With the initial diagnosis, she initiated treatment with 6 cycles of CAF neoadjuvant chemotherapy 02/14, 07/07, 03/28, 04/18/14, 05/08 and 05/29/14. She also received monthly zoledronic acid.  She then underwent a modified left mastectomy of Palacios type and left lymphadenoectomy on 06/27/2014.   Pathologic examination showed number at [facility] was [id number] showed infiltrative grade 2 ductal cancer with 6 level 1 metastatic lympFollow up with Jossie for visits and trastuzumab on 2-5, 2-26, 3-19 with CBC, CMP.  Echocardiogram on 3-19.  Follow up with me 4-9 with  CBC, CMP, CA27.29 and CEA and with CT CAP on 4-8.h nodes and 3 level 2 metastatic lymph nodes.  The differentiation was intermediate.  The tumor was ER positive 70% of the cells, MI positive in 40% of the cells and HER2 was 3+ by immunohistochemistry and the Ki-67 labeling index was 20%. Her staging after surgery was stage IV, pT4b N2 M1.  I don't see a biopsy of the skeletal metastases.  She then had continuation with chemotherapy with 4 cycles of Herceptin and taxane with monthly zoledronic acid.  Tamoxifen was initiated.  She then had two years of Herceptin and a decision was made not to continue further Herceptin.  She continued on zoledronic acid every 3 months. She was clinically stable. [place] then moved to the [place] new refugee from [facility].  She has now been changed to letrozole.  Denosumab has now been held for new diagnosis of osteonecrosis of the R maxilla.     History of cholecystectomy 2020.    Seen in the ED on 10/4/2021, for acute abdominal pain.  Elevated LFTs, elevated lipase.  Question of stone versus pancreatitis.  She was discharged and followed up with an outpatient gastroenterology clinic.  They recommended clear liquid diet and ERCP.  She is here today for clinical assessment and to ensure stability.      TREATMENT HISTORY:  A. Initial diagnosis with metastatic breast cancer in [facility].  Neoadjuvant CAF x 6.   B. Left mastectomy. Left axillary node dissection.  C.  Radiation in 1 dose to R iliac region.  C. Herceptin for 2 years taxane for a prescribed course then stopped, monthly zoledronic acid.  She had 2 years of Herceptin with tamoxifen added after chemotherapy.  D.  Tamoxifen alone and zoledronic acid every 3 months starting 2014.  Letrozole was started   E.  [place]  We restarted Herceptin every 3 weeks and continued tamoxifen. Bone targeted agent changed to denosumab every 9 weeks. Zometa discontinued 2017.  Tamoxifen was changed to letrozole August 2019.    F.  Xgeva  discontinued 12-17-19 because of dental issues.   G.  J+J vaccine March, 2021.  REJI.  Was on Zometa once May 11.  Reaction with eye lid swelling. Discontinued Zometal  H.  Restart Xgeva 6-22-21.  Continuing the trastuzumab and letrozole.  Both tolerated well.       INTERVAL HISTORY  Today she states that she has been feeling better.  Her pain when she was seen on 10/4 was 8 out of 10, her current pain is 1 out of 10.  She has not been needing to take anything for the pain including Percocet or Tylenol over the last 3 days.  She has been maintaining on a clear liquid diet, and does not feel dizzy, lightheaded.  She has been drinking over 64 ounces of fluids, including broths and Pedialyte.  Her bowels have been moving regularly.  She is not having any nausea or vomiting.  She has not had any fevers body aches or chills.  She has not noticed any discoloration or yellowness to her skin.  She has not had any chest pain or shortness of breath.  No lower extremity swelling or pain.     REVIEW OF SYSTEMS:  She denies fevers or chills, cough, chest pain, shortness of breath, nausea or vomiting, constipation, diarrhea, bone pain, or headache.  She does have a chronic low back pain.  A 10 point review of systems is negative.     PHYSICAL EXAM:  /63 (BP Location: Right arm, Patient Position: Sitting, Cuff Size: Adult Large)   Pulse 92   Temp 98.2  F (36.8  C) (Oral)   Resp 16   Wt 79.4 kg (175 lb)   SpO2 96%   BMI 31.01 kg/m      Wt Readings from Last 4 Encounters:   10/08/21 79.4 kg (175 lb)   10/04/21 81.6 kg (180 lb)   09/21/21 82.2 kg (181 lb 3.2 oz)   08/31/21 82.1 kg (181 lb 1.6 oz)       VS: Above vitals reviewed, stable without concerns   General: Resting comfortably in no acute distress  Head: Normocephalic, atraumatic   Heart: Regular rate and rhythm, no murmurs  Lung: Normal respiratory effort. Clear to auscultation bilaterally. No wheezes, rhonchi, or crackles   Abdomen: +Bowel Sounds, soft. Mild TTP over  epigastrum, No rebound, guarding or rigidity. No palpable masses  Neuro: AAO x3. Gait is steady. Moving all extremities   Extremities: No lower extremity edema  Skin: Warm, dry, intact. No rashes, no suspicious lesions. No petechia or bruising noted       Labs  Most Recent 3 CBC's:Recent Labs   Lab Test 10/08/21  0904 10/04/21  1936 09/21/21  1221   WBC 10.7 9.3 7.0   HGB 12.6 12.2 12.7   MCV 94 94 94    183 247    Most Recent 3 BMP's:  Recent Labs   Lab Test 10/08/21  0904 10/04/21  1936 09/21/21  1221    141 143   POTASSIUM 4.0 3.8 3.9   CHLORIDE 106 108 109   CO2 27 26 30   BUN 12 10 11   CR 0.81 0.77 0.81   ANIONGAP 9 7 4   TAYLER 8.6 8.9 9.0   * 98 118*    Most Recent 2 LFT's:  Recent Labs   Lab Test 10/08/21  0904 10/04/21  1936   AST 82* 536*   * 429*   ALKPHOS 101 100   BILITOTAL 0.8 2.2*      I reviewed the above labs today.          Imaging   CT A/P 10/4  IMPRESSION:   1.  Mild extrahepatic biliary dilatation without a definite calcified gallstone present. This is of etiology indeterminate. MRCP may be of further use in characterizing this.  2.  Otherwise no acute process demonstrated in the abdomen and pelvis.          ASSESSMENT AND PLAN:     1.  Hoda Brush is a 66-year-old woman with a history of ER-positive, TN-positive, HER2 positive breast cancer.  She is from [facility], formally from [facility], and came to live in the [place] with her daughter.  She is here for continuation of every 3-week Herceptin, which she has bee no.  N receiving along with tamoxifen daily.  The tumor is ER positive, TN positive and HER2 positive.  She had metastatic disease at the time of presentation with bone-only metastases by report.  She underwent neoadjuvant CAF, had a left mastectomy, left axillary lymph node dissection, radiation to the right hip, which included the right iliac region where she had metastatic disease.  She was initially on tamoxifen but then was switched to letrozole.   She continues on this and every 3-week trastuzumab.  She has had no evidence of disease progression for the last 3 years.  We have continued Herceptin every 3 weeks as well as daily letrozole.   -- Tumor markers have been stable   -CT A/P 10/4 is stable  -Will add CT chest later next week for full restaging  - Has follow up for infusion and appointment with Dr. Aguiar for Tuesday, keep this appointment       2.  Elevated LFTs, lipase, with abdominal pain  -Seen in the ED 10/4,   --symptomatically and clinically improving today, along with improvement of LFTs and lipase.  --Dr. Aguiar previously discussed with GI team, and there is plan for an ERCP on Monday.  Reached out to GI team and they are setting this up   --Continue clears for now as she is tolerating this well.  --Reviewed if she were to develop worsening abdominal pain, fevers, change in symptoms she should seek treatment over the weekend again.      Patient will call in the interim with any questions or concerns. They voice understanding and agree with the above plan.     35 minutes spent on the date of the encounter doing chart review, review of test results, interpretation of tests, patient visit and documentation     Jamee Mckeon PA-C

## 2021-10-09 ENCOUNTER — HEALTH MAINTENANCE LETTER (OUTPATIENT)
Age: 66
End: 2021-10-09

## 2021-10-09 LAB — SARS-COV-2 RNA RESP QL NAA+PROBE: NEGATIVE

## 2021-10-10 ENCOUNTER — ANESTHESIA EVENT (OUTPATIENT)
Dept: SURGERY | Facility: CLINIC | Age: 66
End: 2021-10-10

## 2021-10-11 ENCOUNTER — ANESTHESIA (OUTPATIENT)
Dept: SURGERY | Facility: CLINIC | Age: 66
End: 2021-10-11

## 2021-10-11 ENCOUNTER — PATIENT OUTREACH (OUTPATIENT)
Dept: GASTROENTEROLOGY | Facility: CLINIC | Age: 66
End: 2021-10-11

## 2021-10-11 RX ORDER — LIDOCAINE 40 MG/G
CREAM TOPICAL
Status: CANCELLED | OUTPATIENT
Start: 2021-10-11

## 2021-10-11 NOTE — PROGRESS NOTES
Hoda is a 66 year old who is being evaluated via a billable video visit.      How would you like to obtain your AVS? MyChart  If the video visit is dropped, the invitation should be resent by: Send to e-mail at: [email or web address]  Will anyone else be joining your video visit? No      Video Start Time: 8:30  Video-Visit Details    Type of service:  Video Visit    Video End Time:8:51    Originating Location (pt. Location): Home  Distant Location (provider location):  [facility]     Platform used for Video Visit: Nidia Drummond is a patient from [facility] and is seen here for continuation of care for her metastatic ER+HER2- breast cancer.  She is a [place] refugee from [facility] and was initially seen in clinic with her daughter.  The only data we have from [facility] is an English translation of her records.       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  On 02/11/2014, she was seen by an oncologist.  The clinical staging of T4b N2 M1 was noted.   Her breast cancer was on the left side. There was no right breast cancer, confirmed by the patient and her daughter, correcting a possible error in the translated records.  ER was positive in 100% of the cells, DE at 14% and HER2 was 3+ positive.  It appears that this histopathologic information is on the mastectomy specimen. She underwent an MRI for staging of presumptive bone metastases which was performed 03/02/2014.  There were skeletal metastases in the thoracic vertebrae at 12, L1, L3, L5 and S1 vertebral body, ranging in size from 0.7-3.0 cm in size.  Ischial and right femur were also involved, as well as a large iliac mass measuring about 15 cm in the uterus. 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   Radiation Oncology consultation was performed 03/11/2014, and she was given radiation in 1 dose of 6 Gy to the right iliac lesion.        With the initial  diagnosis, she initiated treatment with 6 cycles of CAF neoadjuvant chemotherapy 02/14, 07/07, 03/28, 04/18/14, 05/08 and 05/29/14. She also received monthly zoledronic acid.  She then underwent a modified left mastectomy of Palacios type and left lymphadenoectomy on 06/27/2014.   Pathologic examination showed number at [facility] was [id number] showed infiltrative grade 2 ductal cancer with 6 level 1 metastatic lympFollow up with Jossie for visits and trastuzumab on 2-5, 2-26, 3-19 with CBC, CMP.  Echocardiogram on 3-19.  Follow up with me 4-9 with CBC, CMP, CA27.29 and CEA and with CT CAP on 4-8.h nodes and 3 level 2 metastatic lymph nodes.  The differentiation was intermediate.  The tumor was ER positive 70% of the cells, WV positive in 40% of the cells and HER2 was 3+ by immunohistochemistry and the Ki-67 labeling index was 20%. Her staging after surgery was stage IV, pT4b N2 M1.  I don't see a biopsy of the skeletal metastases.  She then had continuation with chemotherapy with 4 cycles of Herceptin and taxane with monthly zoledronic acid.  Tamoxifen was initiated.  She then had two years of Herceptin and a decision was made not to continue further Herceptin.  She continued on zoledronic acid every 3 months. She was clinically stable. [place] then [place] to the [place] new [place] from [facility].  She has now been changed to letrozole.  Denosumab has now been held for new diagnosis of osteonecrosis of the R maxilla.     History of cholecystectomy 2020.      TREATMENT HISTORY:  A. Initial diagnosis with metastatic breast cancer in [facility].  Neoadjuvant CAF x 6.   B. Left mastectomy. Left axillary node dissection.  C.  Radiation in 1 dose to R iliac region.  C. Herceptin for 2 years taxane for a prescribed course then stopped, monthly zoledronic acid.  She had 2 years of Herceptin with tamoxifen added after chemotherapy.  D.  Tamoxifen alone and zoledronic acid every 3 months starting 2014.  Letrozole was  started   E.  [place]  We restarted Herceptin every 3 weeks and continued tamoxifen. Bone targeted agent changed to denosumab every 9 weeks. Zometa discontinued 2017.  Tamoxifen was changed to letrozole August 2019.    FRadhika  Xgeva discontinued 12-17-19 because of dental issues.   G.  J+J vaccine March, 2021.  H.  Was on Zometa once May 11.  Reaction with eye lid swelling. Discontinued Zometal  H.  Restart Xgeva 6-22-21.  Continuing the trastuzumab and letrozole.  Both tolerated well.       INTERVAL HISTORY  Hoda was seen in followup today following her evaluation by SEMAJ Turner, on Friday.  Hoda has been feeling much better.  Her abdominal discomfort has resolved.  She is concerned about the episode of abdominal discomfort that she did have which was associated with elevated bilirubin and I do think that a CT would be reasonable to further evaluate the cause for her abdominal discomfort and elevated bilirubin.RUQ pain and elevated bilirubin may have been related to passage of a gallstone. Bilirubin has come back down.  None mostly indirect. No clear evidence of obstruction.  Pancreatitis less likely.  She has a history of cholecystectomy.  Her daughter, Laura, translated for the visit.     REVIEW OF SYSTEMS:   Hoda has been feeling generally well.  Mild bone and back discomfort but not new, no fatigue depression or anxiety today she denies fevers or chills, cough, chest pain, shortness of breath. She did have mild nausea, vomiting, now resolved.  No constipation, diarrhea, bone pain, back pain, or headache.  She denies gastroesophageal reflux symptoms and we've discontinued famotidine.  The remainder of a 10 point ROS was negative.      PHYSICAL EXAM: She appeared generally well.  No jaundice.  Mood and affect were normal.    This was a phone visit with Ghada her daughter who served as Slovak .    Mood and affect normal. No jaundice.      Labs   , AST 82, bilirubin direct 0.4 total  bilirubin 0.8, so hyperbilirubinemia has resolved.  Lipase elevated at 544.  CBC normal.  SARS-CoV-2 PCR negative.    ECG: First-degree AV block otherwise normal ECG.    Imaging   CT abdomen and pelvis 10-4-21  EXAM: CT ABDOMEN PELVIS W CONTRAST  LOCATION: [facility]  DATE/TIME: 10/4/2021 8:42 PM     INDICATION: Epigastric pain, metastatic breast cancer.  COMPARISON: 06/21/2021  TECHNIQUE: CT scan of the abdomen and pelvis was performed following injection of IV contrast. Multiplanar reformats were obtained. Dose reduction techniques were used.  CONTRAST: 91mL [id number]     FINDINGS:   LOWER CHEST: No infiltrate or effusion.     HEPATOBILIARY: Cholecystectomy. Fatty change of the liver. Unusual termination of the common bile duct in the third portion of the duodenum. Mild biliary dilatation for postcholecystectomy state at 12.7 cm.     PANCREAS: No significant mass, duct dilatation, or inflammatory change.     SPLEEN: Normal size.     ADRENAL GLANDS: No significant nodules.     KIDNEYS/BLADDER: No significant mass, stone, or hydronephrosis.     BOWEL: No obstruction or inflammatory change.     LYMPH NODES: No adenopathy.     VASCULATURE: No aneurysm.     PELVIC ORGANS: Fibroid uterus.     MUSCULOSKELETAL: Sclerotic bony lesions noted similar to the recent comparison study.                                                                      IMPRESSION:   1.  Mild extrahepatic biliary dilatation without a definite calcified gallstone present. This is of etiology indeterminate. MRCP may be of further use in characterizing this.  2.  Otherwise no acute process demonstrated in the abdomen and pelvis.    EXAM: US ABDOMEN LIMITED  LOCATION: [facility]  DATE/TIME: 10/4/2021 9:40 PM     INDICATION: Elevated liver function tests, prominent biliary tree on CT  COMPARISON: CT earlier today, ultrasound 10/15/2020 and additional exams dating back to 08/31/2019  TECHNIQUE:  Limited abdominal ultrasound.     FINDINGS:     GALLBLADDER: Surgically removed.     BILE DUCTS: Mild intrahepatic biliary dilatation better appreciated on CT. The common duct measures 11 mm.     LIVER: Dense hepatic steatosis. No focal mass.     RIGHT KIDNEY: No hydronephrosis.     PANCREAS: Completely obscured from bowel gas.     No ascites.                                                                      IMPRESSION:  1.  Dense hepatic steatosis.  2.  Biliary dilatation as noted on CT of unclear etiology.     ASSESSMENT AND PLAN:     1.  Hoda Brush is a 66-year-old woman with a history of ER-positive, IA-positive, HER2 positive breast cancer.  She is from [facility], formally from [facility], and came to live in the [place] with her daughter.  She is here for continuation of every 3-week Herceptin, which she has bee no.  N receiving along with tamoxifen daily.  The tumor is ER positive, IA positive and HER2 positive.  She had metastatic disease at the time of presentation with bone-only metastases by report.  She underwent neoadjuvant CAF, had a left mastectomy, left axillary lymph node dissection, radiation to the right hip, which included the right iliac region where she had metastatic disease.  She was initially on tamoxifen but then was switched to letrozole.  She continues on this and every 3-week trastuzumab.  She has had no evidence of disease progression for the last 3 years.  Markers are low and stable.  We have continued Herceptin every 3 weeks as well as daily letrozole. CT AP showed mild intrahepatic biliary dilatation.   2.  Episode of abdominal pain and elevated LFT's and mild intrahepatic biliar dilatation.  The plan is to perform an MRCP.  We will obtain a gastroenterology consult with Dr. Derek Kate after the MRCP for recommendations.  We will continue the letrozole, trastuzumab.   I discussed that because she does have some biliary colic and is status post cholecystectomy, it would  be reasonable to avoid fatty foods with a high saturated fat content.  I discussed that, nonetheless, foods with polyunsaturated such as canola oil would be very reasonable.  Discussed the overall plan with Hoda and her daughter, who are in agreement.    3.  Mild transaminase elevations.  Restaging showed no evidence of liver metastases.  These LFT elevations may be due to fatty liver.  4.  Continue the letrozole.   5.  Echo. Stable EF. --Echo in  showed normal EF without change 60 to 65%. 2-16-21.  6.  Discussion of bone health and right maxillary osteonecrosis.  She will continue with calcium and vitamin D.  Restart Xgeva in 3 weeks for osteoporosis because right mandible problem was treated by her dentist and has resolved.   7. Follow up.     Trastuzumab and Xgeva every other visit both on October 12.  Follow up with KARISSA and trastuzumab on 11-2, 11-23, and with me 12-14.  CT CAP on 12-13.  MRCP this week. CBC, CMP, CA27.29 and CEA with follow up visits. GI consult with Dr. Derek Kate week after the MRCP.      Thank you for allowing us to participate in this patient's care.      Sincerely,    Lisandro Aguira M.D.   Professor   Division of Hematology, Oncology, Transplant   Department of Medicine   [employer]   [phone]     ADDENDUM:  ALT trending down as of 10-18-21. Bili 0.4  CT CAP 10-18-21  IMPRESSION: In this patient with history of breast cancer, the current  scan compared to CT chest abdomen pelvis 6/21/2021 and CT abdomen  pelvis 10/4/2021 shows:     1. Grossly unchanged multiple osseous metastatic lesions.  2. Stable subcentimeter pulmonary nodules. No new pulmonary nodule.  May consider attention on follow-up.  3. No new metastatic lesion within the chest, abdomen and pelvis.  4. Resolution of mild intrahepatic biliary ductal dilatation compared  to prior CT 10/4/2021   5. Bulky myomatous uterus, may consider pelvic ultrasound for  evaluation if clinically  desired.     I have personally reviewed the examination and initial interpretation  and I agree with the findings.     PRERNA FLORES MD           8:30-8:51  I spent 21 minutes with the patient more than 50% of which was in counseling and coordination of care.

## 2021-10-11 NOTE — TELEPHONE ENCOUNTER
Called patient regarding procedure today. Talked to daughter and mom over the phone.     They have many questions regarding diet, how to minimize Gi symptoms.     Per patient feels better, pain is better but it still hurts when she pushes in. Urine is very light. No jaundice noted. No fever. Pt will follow up with Dr Aguiar regarding labs and dietitian follow up.    ML

## 2021-10-12 ENCOUNTER — VIRTUAL VISIT (OUTPATIENT)
Dept: ONCOLOGY | Facility: CLINIC | Age: 66
End: 2021-10-12
Attending: INTERNAL MEDICINE
Payer: COMMERCIAL

## 2021-10-12 ENCOUNTER — LAB (OUTPATIENT)
Dept: LAB | Facility: CLINIC | Age: 66
End: 2021-10-12
Attending: INTERNAL MEDICINE
Payer: COMMERCIAL

## 2021-10-12 VITALS
DIASTOLIC BLOOD PRESSURE: 68 MMHG | OXYGEN SATURATION: 98 % | HEART RATE: 75 BPM | SYSTOLIC BLOOD PRESSURE: 110 MMHG | WEIGHT: 174.6 LBS | RESPIRATION RATE: 16 BRPM | BODY MASS INDEX: 30.94 KG/M2 | TEMPERATURE: 98.1 F

## 2021-10-12 DIAGNOSIS — C79.51 BONE METASTASIS: ICD-10-CM

## 2021-10-12 DIAGNOSIS — C50.912 MALIGNANT NEOPLASM OF LEFT FEMALE BREAST, UNSPECIFIED ESTROGEN RECEPTOR STATUS, UNSPECIFIED SITE OF BREAST (H): Primary | ICD-10-CM

## 2021-10-12 LAB
ALBUMIN SERPL-MCNC: 3.2 G/DL (ref 3.4–5)
ALP SERPL-CCNC: 74 U/L (ref 40–150)
ALT SERPL W P-5'-P-CCNC: 154 U/L (ref 0–50)
ANION GAP SERPL CALCULATED.3IONS-SCNC: 1 MMOL/L (ref 3–14)
AST SERPL W P-5'-P-CCNC: 102 U/L (ref 0–45)
BASOPHILS # BLD AUTO: 0 10E3/UL (ref 0–0.2)
BASOPHILS NFR BLD AUTO: 0 %
BILIRUB SERPL-MCNC: 0.4 MG/DL (ref 0.2–1.3)
BUN SERPL-MCNC: 12 MG/DL (ref 7–30)
CALCIUM SERPL-MCNC: 8.9 MG/DL (ref 8.5–10.1)
CANCER AG27-29 SERPL-ACNC: 11 U/ML (ref 0–39)
CEA SERPL-MCNC: <=0.5 UG/L (ref 0–2.5)
CHLORIDE BLD-SCNC: 110 MMOL/L (ref 94–109)
CO2 SERPL-SCNC: 28 MMOL/L (ref 20–32)
CREAT SERPL-MCNC: 0.79 MG/DL (ref 0.52–1.04)
EOSINOPHIL # BLD AUTO: 0.3 10E3/UL (ref 0–0.7)
EOSINOPHIL NFR BLD AUTO: 4 %
ERYTHROCYTE [DISTWIDTH] IN BLOOD BY AUTOMATED COUNT: 13.4 % (ref 10–15)
GFR SERPL CREATININE-BSD FRML MDRD: 79 ML/MIN/1.73M2
GLUCOSE BLD-MCNC: 84 MG/DL (ref 70–99)
HCT VFR BLD AUTO: 38.3 % (ref 35–47)
HGB BLD-MCNC: 11.8 G/DL (ref 11.7–15.7)
IMM GRANULOCYTES # BLD: 0 10E3/UL
IMM GRANULOCYTES NFR BLD: 0 %
LYMPHOCYTES # BLD AUTO: 3 10E3/UL (ref 0.8–5.3)
LYMPHOCYTES NFR BLD AUTO: 38 %
MCH RBC QN AUTO: 29.1 PG (ref 26.5–33)
MCHC RBC AUTO-ENTMCNC: 30.8 G/DL (ref 31.5–36.5)
MCV RBC AUTO: 94 FL (ref 78–100)
MONOCYTES # BLD AUTO: 0.5 10E3/UL (ref 0–1.3)
MONOCYTES NFR BLD AUTO: 6 %
NEUTROPHILS # BLD AUTO: 4.1 10E3/UL (ref 1.6–8.3)
NEUTROPHILS NFR BLD AUTO: 52 %
NRBC # BLD AUTO: 0 10E3/UL
NRBC BLD AUTO-RTO: 0 /100
PLATELET # BLD AUTO: 313 10E3/UL (ref 150–450)
POTASSIUM BLD-SCNC: 4 MMOL/L (ref 3.4–5.3)
PROT SERPL-MCNC: 8 G/DL (ref 6.8–8.8)
RBC # BLD AUTO: 4.06 10E6/UL (ref 3.8–5.2)
SODIUM SERPL-SCNC: 139 MMOL/L (ref 133–144)
WBC # BLD AUTO: 8 10E3/UL (ref 4–11)

## 2021-10-12 PROCEDURE — 36591 DRAW BLOOD OFF VENOUS DEVICE: CPT | Performed by: INTERNAL MEDICINE

## 2021-10-12 PROCEDURE — 82040 ASSAY OF SERUM ALBUMIN: CPT | Performed by: INTERNAL MEDICINE

## 2021-10-12 PROCEDURE — 99214 OFFICE O/P EST MOD 30 MIN: CPT | Mod: 95 | Performed by: INTERNAL MEDICINE

## 2021-10-12 PROCEDURE — 86300 IMMUNOASSAY TUMOR CA 15-3: CPT | Performed by: INTERNAL MEDICINE

## 2021-10-12 PROCEDURE — 258N000003 HC RX IP 258 OP 636: Performed by: INTERNAL MEDICINE

## 2021-10-12 PROCEDURE — 96372 THER/PROPH/DIAG INJ SC/IM: CPT | Mod: 59 | Performed by: INTERNAL MEDICINE

## 2021-10-12 PROCEDURE — 85025 COMPLETE CBC W/AUTO DIFF WBC: CPT | Performed by: INTERNAL MEDICINE

## 2021-10-12 PROCEDURE — 250N000011 HC RX IP 250 OP 636: Performed by: INTERNAL MEDICINE

## 2021-10-12 PROCEDURE — 96413 CHEMO IV INFUSION 1 HR: CPT

## 2021-10-12 PROCEDURE — 82378 CARCINOEMBRYONIC ANTIGEN: CPT | Performed by: INTERNAL MEDICINE

## 2021-10-12 RX ORDER — HEPARIN SODIUM (PORCINE) LOCK FLUSH IV SOLN 100 UNIT/ML 100 UNIT/ML
5 SOLUTION INTRAVENOUS EVERY 8 HOURS
Status: CANCELLED | OUTPATIENT
Start: 2021-11-04

## 2021-10-12 RX ORDER — DIPHENHYDRAMINE HYDROCHLORIDE 50 MG/ML
50 INJECTION INTRAMUSCULAR; INTRAVENOUS
Status: CANCELLED
Start: 2021-11-04

## 2021-10-12 RX ORDER — LORAZEPAM 2 MG/ML
0.5 INJECTION INTRAMUSCULAR EVERY 4 HOURS PRN
Status: CANCELLED
Start: 2021-11-04

## 2021-10-12 RX ORDER — HEPARIN SODIUM (PORCINE) LOCK FLUSH IV SOLN 100 UNIT/ML 100 UNIT/ML
5 SOLUTION INTRAVENOUS DAILY PRN
Status: DISCONTINUED | OUTPATIENT
Start: 2021-10-12 | End: 2021-10-12 | Stop reason: HOSPADM

## 2021-10-12 RX ORDER — EPINEPHRINE 0.3 MG/.3ML
0.3 INJECTION SUBCUTANEOUS EVERY 5 MIN PRN
Status: CANCELLED | OUTPATIENT
Start: 2021-11-04

## 2021-10-12 RX ORDER — DIPHENHYDRAMINE HCL 25 MG
50 CAPSULE ORAL ONCE
Status: CANCELLED
Start: 2021-11-04

## 2021-10-12 RX ORDER — ALBUTEROL SULFATE 90 UG/1
1-2 AEROSOL, METERED RESPIRATORY (INHALATION)
Status: CANCELLED
Start: 2021-11-04

## 2021-10-12 RX ORDER — ACETAMINOPHEN 325 MG/1
650 TABLET ORAL
Status: CANCELLED | OUTPATIENT
Start: 2021-11-04

## 2021-10-12 RX ORDER — ALBUTEROL SULFATE 0.83 MG/ML
2.5 SOLUTION RESPIRATORY (INHALATION)
Status: CANCELLED | OUTPATIENT
Start: 2021-11-04

## 2021-10-12 RX ORDER — EPINEPHRINE 1 MG/ML
0.3 INJECTION, SOLUTION INTRAMUSCULAR; SUBCUTANEOUS EVERY 5 MIN PRN
Status: CANCELLED | OUTPATIENT
Start: 2021-11-04

## 2021-10-12 RX ORDER — SODIUM CHLORIDE 9 MG/ML
1000 INJECTION, SOLUTION INTRAVENOUS CONTINUOUS PRN
Status: CANCELLED
Start: 2021-11-04

## 2021-10-12 RX ORDER — MEPERIDINE HYDROCHLORIDE 25 MG/ML
25 INJECTION INTRAMUSCULAR; INTRAVENOUS; SUBCUTANEOUS EVERY 30 MIN PRN
Status: CANCELLED | OUTPATIENT
Start: 2021-11-04

## 2021-10-12 RX ORDER — METHYLPREDNISOLONE SODIUM SUCCINATE 125 MG/2ML
125 INJECTION, POWDER, LYOPHILIZED, FOR SOLUTION INTRAMUSCULAR; INTRAVENOUS
Status: CANCELLED
Start: 2021-11-04

## 2021-10-12 RX ORDER — HEPARIN SODIUM (PORCINE) LOCK FLUSH IV SOLN 100 UNIT/ML 100 UNIT/ML
5 SOLUTION INTRAVENOUS EVERY 8 HOURS
Status: DISCONTINUED | OUTPATIENT
Start: 2021-10-12 | End: 2021-10-12 | Stop reason: HOSPADM

## 2021-10-12 RX ADMIN — Medication 5 ML: at 14:12

## 2021-10-12 RX ADMIN — SODIUM CHLORIDE 250 ML: 9 INJECTION, SOLUTION INTRAVENOUS at 13:38

## 2021-10-12 RX ADMIN — TRASTUZUMAB 450 MG: 150 INJECTION, POWDER, LYOPHILIZED, FOR SOLUTION INTRAVENOUS at 13:38

## 2021-10-12 RX ADMIN — Medication 5 ML: at 12:37

## 2021-10-12 RX ADMIN — DENOSUMAB 120 MG: 120 INJECTION SUBCUTANEOUS at 13:41

## 2021-10-12 ASSESSMENT — PAIN SCALES - GENERAL: PAINLEVEL: NO PAIN (0)

## 2021-10-12 NOTE — NURSING NOTE
Chief Complaint   Patient presents with     Port Draw     Labs drawn via port by RN in lab. VS taken     Port accessed with 20 gauge 3/4 in power needle by RN, labs collected, line flushed with saline and heparin.  Vitals taken. Pt checked in for appointment.    Gwen Allen RN

## 2021-10-12 NOTE — PROGRESS NOTES
Infusion Nursing Note:  Hoda Brush presents today for Cycle 70 Herceptin, xgeva.    Patient seen by provider today: Yes: Dr. Aguiar   present during visit today: Patient declines    Note: Patient received her flu vaccine at [facility], requesting we have it recorded in Epic.      Intravenous Access:  Implanted Port.    Treatment Conditions:  Lab Results   Component Value Date    HGB 11.8 10/12/2021    WBC 8.0 10/12/2021    ANEU 3.1 06/22/2021    ANEUTAUTO 4.1 10/12/2021     10/12/2021      Lab Results   Component Value Date     10/12/2021    POTASSIUM 4.0 10/12/2021    CR 0.79 10/12/2021    TAYLER 8.9 10/12/2021    BILITOTAL 0.4 10/12/2021    ALBUMIN 3.2 (L) 10/12/2021     (H) 10/12/2021     (H) 10/12/2021     ECHO/MUGA completed 8/30/2021  EF 55-60%.      Post Infusion Assessment:  Patient tolerated infusion without incident.  Patient tolerated injection without incident to right arm.  Access discontinued per protocol.       Discharge Plan:   Patient declined prescription refills.  AVS to patient via [employer].  Patient will return in 3 weeks for next infusion,  Appointment yet to be scheduled, patient will watch ensemblit.   Patient discharged in stable condition accompanied by: self.  Departure Mode: Ambulatory.  Face to Face time: 2 minutes.      Caren Wheeler RN

## 2021-10-12 NOTE — LETTER
10/12/2021         RE: Hoda Brush  [address]  [facility] [address]        Dear Colleague,    Thank you for referring your patient, Hoda Brush, to the [facility]. Please see a copy of my visit note below.    Hoda is a 66 year old who is being evaluated via a billable video visit.      How would you like to obtain your AVS? MyChart  If the video visit is dropped, the invitation should be resent by: Send to e-mail at: [email or web address]  Will anyone else be joining your video visit? No      Video Start Time: 8:30  Video-Visit Details    Type of service:  Video Visit    Video End Time:8:51    Originating Location (pt. Location): Home  Distant Location (provider location):  [facility]     Platform used for Video Visit: Nidia Drummond is a patient from [facility] and is seen here for continuation of care for her metastatic ER+HER2- breast cancer.  She is a [employer] refugee from [facility] and was initially seen in clinic with her daughter.  The only data we have from [facility] is an English translation of her records.       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  On 02/11/2014, she was seen by an oncologist.  The clinical staging of T4b N2 M1 was noted.   Her breast cancer was on the left side. There was no right breast cancer, confirmed by the patient and her daughter, correcting a possible error in the translated records.  ER was positive in 100% of the cells, NH at 14% and HER2 was 3+ positive.  It appears that this histopathologic information is on the mastectomy specimen. She underwent an MRI for staging of presumptive bone metastases which was performed 03/02/2014.  There were skeletal metastases in the thoracic vertebrae at 12, L1, L3, L5 and S1 vertebral body, ranging in size from 0.7-3.0 cm in size.  Ischial and right  femur were also involved, as well as a large iliac mass measuring about 15 cm in the uterus. 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   Radiation Oncology consultation was performed 03/11/2014, and she was given radiation in 1 dose of 6 Gy to the right iliac lesion.        With the initial diagnosis, she initiated treatment with 6 cycles of CAF neoadjuvant chemotherapy 02/14, 07/07, 03/28, 04/18/14, 05/08 and 05/29/14. She also received monthly zoledronic acid.  She then underwent a modified left mastectomy of Palacios type and left lymphadenoectomy on 06/27/2014.   Pathologic examination showed number at [facility] was [id number] showed infiltrative grade 2 ductal cancer with 6 level 1 metastatic lympFollow up with Jossie for visits and trastuzumab on 2-5, 2-26, 3-19 with CBC, CMP.  Echocardiogram on 3-19.  Follow up with me 4-9 with CBC, CMP, CA27.29 and CEA and with CT CAP on 4-8.h nodes and 3 level 2 metastatic lymph nodes.  The differentiation was intermediate.  The tumor was ER positive 70% of the cells, ID positive in 40% of the cells and HER2 was 3+ by immunohistochemistry and the Ki-67 labeling index was 20%. Her staging after surgery was stage IV, pT4b N2 M1.  I don't see a biopsy of the skeletal metastases.  She then had continuation with chemotherapy with 4 cycles of Herceptin and taxane with monthly zoledronic acid.  Tamoxifen was initiated.  She then had two years of Herceptin and a decision was made not to continue further Herceptin.  She continued on zoledronic acid every 3 months. She was clinically stable. [place] then [place] the [place] new [place] from [facility].  She has now been changed to letrozole.  Denosumab has now been held for new diagnosis of osteonecrosis of the R maxilla.     History of cholecystectomy 2020.      TREATMENT HISTORY:  A. Initial diagnosis with metastatic breast cancer in [facility].  Neoadjuvant CAF x 6.   B. Left mastectomy. Left axillary node dissection.  C.  Radiation in 1 dose to R  iliac region.  C. Herceptin for 2 years taxane for a prescribed course then stopped, monthly zoledronic acid.  She had 2 years of Herceptin with tamoxifen added after chemotherapy.  D.  Tamoxifen alone and zoledronic acid every 3 months starting 2014.  Letrozole was started   E.  [place]  We restarted Herceptin every 3 weeks and continued tamoxifen. Bone targeted agent changed to denosumab every 9 weeks. Zometa discontinued 2017.  Tamoxifen was changed to letrozole August 2019.    F.  Xgeva discontinued 12-17-19 because of dental issues.   G.  J+J vaccine March, 2021.  H.  Was on Zometa once May 11.  Reaction with eye lid swelling. Discontinued Zometal  H.  Restart Xgeva 6-22-21.  Continuing the trastuzumab and letrozole.  Both tolerated well.       INTERVAL HISTORY  Hoda was seen in followup today following her evaluation by SEMAJ Turner, on Friday.  Hoda has been feeling much better.  Her abdominal discomfort has resolved.  She is concerned about the episode of abdominal discomfort that she did have which was associated with elevated bilirubin and I do think that a CT would be reasonable to further evaluate the cause for her abdominal discomfort and elevated bilirubin.RUQ pain and elevated bilirubin may have been related to passage of a gallstone. Bilirubin has come back down.  None mostly indirect. No clear evidence of obstruction.  Pancreatitis less likely.  She has a history of cholecystectomy.  Her daughter, Laura, translated for the visit.     REVIEW OF SYSTEMS:   Hoda has been feeling generally well.  Mild bone and back discomfort but not new, no fatigue depression or anxiety today she denies fevers or chills, cough, chest pain, shortness of breath. She did have mild nausea, vomiting, now resolved.  No constipation, diarrhea, bone pain, back pain, or headache.  She denies gastroesophageal reflux symptoms and we've discontinued famotidine.  The remainder of a 10 point ROS was negative.       PHYSICAL EXAM: She appeared generally well.  No jaundice.  Mood and affect were normal.    This was a phone visit with Ghada her daughter who served as Mauritian .    Mood and affect normal. No jaundice.      Labs   , AST 82, bilirubin direct 0.4 total bilirubin 0.8, so hyperbilirubinemia has resolved.  Lipase elevated at 544.  CBC normal.  SARS-CoV-2 PCR negative.    ECG: First-degree AV block otherwise normal ECG.    Imaging   CT abdomen and pelvis 10-4-21  EXAM: CT ABDOMEN PELVIS W CONTRAST  LOCATION: [facility]  DATE/TIME: 10/4/2021 8:42 PM     INDICATION: Epigastric pain, metastatic breast cancer.  COMPARISON: 06/21/2021  TECHNIQUE: CT scan of the abdomen and pelvis was performed following injection of IV contrast. Multiplanar reformats were obtained. Dose reduction techniques were used.  CONTRAST: 91mL Isovue-370     FINDINGS:   LOWER CHEST: No infiltrate or effusion.     HEPATOBILIARY: Cholecystectomy. Fatty change of the liver. Unusual termination of the common bile duct in the third portion of the duodenum. Mild biliary dilatation for postcholecystectomy state at 12.7 cm.     PANCREAS: No significant mass, duct dilatation, or inflammatory change.     SPLEEN: Normal size.     ADRENAL GLANDS: No significant nodules.     KIDNEYS/BLADDER: No significant mass, stone, or hydronephrosis.     BOWEL: No obstruction or inflammatory change.     LYMPH NODES: No adenopathy.     VASCULATURE: No aneurysm.     PELVIC ORGANS: Fibroid uterus.     MUSCULOSKELETAL: Sclerotic bony lesions noted similar to the recent comparison study.                                                                      IMPRESSION:   1.  Mild extrahepatic biliary dilatation without a definite calcified gallstone present. This is of etiology indeterminate. MRCP may be of further use in characterizing this.  2.  Otherwise no acute process demonstrated in the abdomen and pelvis.    EXAM: US ABDOMEN  LIMITED  LOCATION: [facility]  DATE/TIME: 10/4/2021 9:40 PM     INDICATION: Elevated liver function tests, prominent biliary tree on CT  COMPARISON: CT earlier today, ultrasound 10/15/2020 and additional exams dating back to 08/31/2019  TECHNIQUE: Limited abdominal ultrasound.     FINDINGS:     GALLBLADDER: Surgically removed.     BILE DUCTS: Mild intrahepatic biliary dilatation better appreciated on CT. The common duct measures 11 mm.     LIVER: Dense hepatic steatosis. No focal mass.     RIGHT KIDNEY: No hydronephrosis.     PANCREAS: Completely obscured from bowel gas.     No ascites.                                                                      IMPRESSION:  1.  Dense hepatic steatosis.  2.  Biliary dilatation as noted on CT of unclear etiology.     ASSESSMENT AND PLAN:     1.  Hoda Brush is a 66-year-old woman with a history of ER-positive, CT-positive, HER2 positive breast cancer.  She is from [facility], formally from [facility], and came to live in the [place] with her daughter.  She is here for continuation of every 3-week Herceptin, which she has bee no.  N receiving along with tamoxifen daily.  The tumor is ER positive, CT positive and HER2 positive.  She had metastatic disease at the time of presentation with bone-only metastases by report.  She underwent neoadjuvant CAF, had a left mastectomy, left axillary lymph node dissection, radiation to the right hip, which included the right iliac region where she had metastatic disease.  She was initially on tamoxifen but then was switched to letrozole.  She continues on this and every 3-week trastuzumab.  She has had no evidence of disease progression for the last 3 years.  Markers are low and stable.  We have continued Herceptin every 3 weeks as well as daily letrozole. CT AP showed mild intrahepatic biliary dilatation.   2.  Episode of abdominal pain and elevated LFT's and mild intrahepatic biliar dilatation.  The plan is  to perform an MRCP.  We will obtain a gastroenterology consult with Dr. Derek Kate after the MRCP for recommendations.  We will continue the letrozole, trastuzumab.   I discussed that because she does have some biliary colic and is status post cholecystectomy, it would be reasonable to avoid fatty foods with a high saturated fat content.  I discussed that, nonetheless, foods with polyunsaturated such as canola oil would be very reasonable.  Discussed the overall plan with Hoda and her daughter, who are in agreement.    3.  Mild transaminase elevations.  Restaging showed no evidence of liver metastases.  These LFT elevations may be due to fatty liver.  4.  Continue the letrozole.   5.  Echo. Stable EF. --Echo in  showed normal EF without change 60 to 65%. 2-16-21.  6.  Discussion of bone health and right maxillary osteonecrosis.  She will continue with calcium and vitamin D.  Restart Xgeva in 3 weeks for osteoporosis because right mandible problem was treated by her dentist and has resolved.   7. Follow up.     Trastuzumab and Xgeva every other visit both on October 12.  Follow up with KARISSA and trastuzumab on 11-2, 11-23, and with me 12-14.  CT CAP on 12-13.  MRCP this week. CBC, CMP, CA27.29 and CEA with follow up visits. GI consult with Dr. Derek Kate week after the MRCP.      Thank you for allowing us to participate in this patient's care.      Sincerely,    Lisandro Aguiar M.D.   Professor   Division of Hematology, Oncology, Transplant   Department of Medicine   [employer]   [phone]     ADDENDUM:  ALT trending down as of 10-18-21. Bili 0.4  CT CAP 10-18-21  IMPRESSION: In this patient with history of breast cancer, the current  scan compared to CT chest abdomen pelvis 6/21/2021 and CT abdomen  pelvis 10/4/2021 shows:     1. Grossly unchanged multiple osseous metastatic lesions.  2. Stable subcentimeter pulmonary nodules. No new pulmonary nodule.  May consider attention  on follow-up.  3. No new metastatic lesion within the chest, abdomen and pelvis.  4. Resolution of mild intrahepatic biliary ductal dilatation compared  to prior CT 10/4/2021   5. Bulky myomatous uterus, may consider pelvic ultrasound for  evaluation if clinically desired.     I have personally reviewed the examination and initial interpretation  and I agree with the findings.     PRERNA FLORES MD           8:30-8:51  I spent 21 minutes with the patient more than 50% of which was in counseling and coordination of care.       Again, thank you for allowing me to participate in the care of your patient.        Sincerely,        Lisandro Aguiar MD

## 2021-10-18 ENCOUNTER — ANCILLARY PROCEDURE (OUTPATIENT)
Dept: CT IMAGING | Facility: CLINIC | Age: 66
End: 2021-10-18
Attending: INTERNAL MEDICINE
Payer: COMMERCIAL

## 2021-10-18 ENCOUNTER — NURSE TRIAGE (OUTPATIENT)
Dept: NURSING | Facility: CLINIC | Age: 66
End: 2021-10-18

## 2021-10-18 DIAGNOSIS — C50.919 METASTATIC BREAST CANCER: ICD-10-CM

## 2021-10-18 DIAGNOSIS — Z95.828 PORT-A-CATH IN PLACE: Primary | ICD-10-CM

## 2021-10-18 PROCEDURE — 71260 CT THORAX DX C+: CPT | Performed by: STUDENT IN AN ORGANIZED HEALTH CARE EDUCATION/TRAINING PROGRAM

## 2021-10-18 PROCEDURE — 74177 CT ABD & PELVIS W/CONTRAST: CPT | Performed by: STUDENT IN AN ORGANIZED HEALTH CARE EDUCATION/TRAINING PROGRAM

## 2021-10-18 RX ORDER — HEPARIN SODIUM (PORCINE) LOCK FLUSH IV SOLN 100 UNIT/ML 100 UNIT/ML
500 SOLUTION INTRAVENOUS ONCE
Status: COMPLETED | OUTPATIENT
Start: 2021-10-18 | End: 2021-10-18

## 2021-10-18 RX ORDER — IOPAMIDOL 755 MG/ML
107 INJECTION, SOLUTION INTRAVASCULAR ONCE
Status: COMPLETED | OUTPATIENT
Start: 2021-10-18 | End: 2021-10-18

## 2021-10-18 RX ADMIN — IOPAMIDOL 107 ML: 755 INJECTION, SOLUTION INTRAVASCULAR at 15:53

## 2021-10-18 RX ADMIN — HEPARIN SODIUM (PORCINE) LOCK FLUSH IV SOLN 100 UNIT/ML 500 UNITS: 100 SOLUTION at 15:48

## 2021-10-18 NOTE — TELEPHONE ENCOUNTER
Daughter calling. Her toe nail is going into toe and shows infection. Tried podiatry who she's seen in the past. Nov 2nd appointment. I connected with scheduling for an appointment with Family Practice or Internal Medicine. I also advised the urgent care.  Ban Mccoy RN  [facility]      Reason for Disposition    [1] MODERATE pain (e.g., limping, interferes with normal activities) AND [2] can't locate corner of toenail    Additional Information    Negative: Doesn't match the SYMPTOMS for ingrown toenail    Negative: Patient sounds very sick or weak to the triager    Negative: [1] Looks infected (e.g., spreading redness, red streak, pus) AND [2] fever    Negative: [1] Red streaking AND [2] longer than 1 inch (2.5 cm)    Negative: [1] Skin around the nail has become red AND [2] larger than 2 inches (5 cm)    Negative: Entire toe is red    Negative: Entire toe is swollen    Negative: Yellow pus seen in skin around toenail (cuticle area), or pus seen under toenail    Negative: [1] MODERATE pain (e.g., limping, interferes with normal activities) AND [2] present > 3 days     Pain at 2.    Protocols used: TOENAIL - INGROWN-A-

## 2021-10-20 ENCOUNTER — PATIENT OUTREACH (OUTPATIENT)
Dept: GERIATRIC MEDICINE | Facility: CLINIC | Age: 66
End: 2021-10-20

## 2021-10-20 ASSESSMENT — ACTIVITIES OF DAILY LIVING (ADL): DEPENDENT_IADLS:: INDEPENDENT

## 2021-10-21 ENCOUNTER — OFFICE VISIT (OUTPATIENT)
Dept: FAMILY MEDICINE | Facility: CLINIC | Age: 66
End: 2021-10-21
Payer: COMMERCIAL

## 2021-10-21 VITALS
BODY MASS INDEX: 31.18 KG/M2 | HEIGHT: 63 IN | WEIGHT: 176 LBS | RESPIRATION RATE: 16 BRPM | TEMPERATURE: 97.1 F | DIASTOLIC BLOOD PRESSURE: 77 MMHG | SYSTOLIC BLOOD PRESSURE: 121 MMHG | HEART RATE: 93 BPM | OXYGEN SATURATION: 97 %

## 2021-10-21 DIAGNOSIS — L60.0 INGROWN TOENAIL OF RIGHT FOOT: Primary | ICD-10-CM

## 2021-10-21 PROCEDURE — 99213 OFFICE O/P EST LOW 20 MIN: CPT | Performed by: NURSE PRACTITIONER

## 2021-10-21 ASSESSMENT — MIFFLIN-ST. JEOR: SCORE: 1312.3

## 2021-10-21 NOTE — PROGRESS NOTES
[facility] Care Coordination Contact    [facility] Initial Assessment     Telephone visit for Initial Health Risk Assessment with Hoda LAMIN Gonsalo completed on October 20, 2021 due to Covid-19 restrictions.    Type of residence:: Apartment - handicap accessible  Current living arrangement:: I live in a private home with spouse     Assessment completed with:: Patient, Other (Rwandan )    Current Care Plan  Member currently receiving the following home care services:   none  Member currently receiving the following community resources: OP Infusion      Medication Review  Medication reconciliation completed in Epic: No  Medication set-up & administration: Independent and sets up on own weekly.  Self-administers medications.  Medication Risk Assessment Medication (1 or more, place referral to MTM): N/A: No risk factors identified  MTM Referral Placed: No: No risk factors idenified    Mental/Behavioral Health   Depression Screening:   PHQ-2 Total Score (Adult) - Positive if 3 or more points; Administer PHQ-9 if positive: 0       Mental health DX:: No        Falls Assessment:       Any fall with injury in the past year?: No    ADL/IADL Dependencies:   Dependent ADLs:: Independent  Dependent IADLs:: Independent    [facility] Health Plan sponsored benefits: Shared information re: Silver Sneakers/gym memberships, ASA, Calcium +D.    PCA Assessment completed at visit: Not Applicable     Elderly Waiver Eligibility: No-does not meet criteria    Care Plan & Recommendations: Member declines need for any services at this time.    See CC for detailed assessment information.    Follow-Up Plan: Member informed of future contact, plan to f/u with member with a 6 month telephone assessment.  Contact information shared with member and family, encouraged member to call with any questions or concerns at any time.    [place] care continuum providers: Please refer to Health Care Home on the [facility] Problem List to  view this patient's [facility] Care Plan Summary.  Lory Arriaga RN  [facility] Care Coordinator  [phone]

## 2021-10-21 NOTE — PROGRESS NOTES
"  Assessment & Plan   Problem List Items Addressed This Visit     None      Visit Diagnoses     Ingrown toenail of right foot    -  Primary    Relevant Orders    Orthopedic  Referral         Patient presents with suspected ingrown toenail of the right hallux, reddened, with previous yellow drainage that has subsided. An appointment has been made with Podiatry on Monday for them to assess and treat.     KATHI Moon CNP  [facility] TALI Drummond is a 66 year old who presents for the following health issues     HPI     Patient presents with pain in her right great toe for about 3 weeks, has tried to treat with warm water soaks.  She had this problem in her finger before. She is otherwise feeling well. She has been soaking it in water. She had some yellow drainage that has subsided.     Review of Systems   Detailed as above.         Objective    /77 (BP Location: Right arm, Patient Position: Sitting, Cuff Size: Adult Regular)   Pulse 93   Temp 97.1  F (36.2  C) (Temporal)   Resp 16   Ht 1.6 m (5' 2.99\")   Wt 79.8 kg (176 lb)   SpO2 97%   BMI 31.19 kg/m    Body mass index is 31.19 kg/m .  Physical Exam  Constitutional:       Appearance: Normal appearance.   Feet:      Right foot:      Toenail Condition: Right toenails are ingrown.      Left foot:      Toenail Condition: Left toenails are normal.      Comments: Right hallux with erythema on medial side of toenail. No drainage.   Neurological:      Mental Status: She is alert.                I saw this patient in collaboration with Rupa Romo NP Student      I was present with the KATHI/PA student who participated in the service and in the documentation of the services provided. I have verified the history and personally performed the physical exam and medical decision making, as documented by the student and edited by me.     KATHI Aggarwal, MALCOLM Romo NP Student.   "

## 2021-10-25 ENCOUNTER — OFFICE VISIT (OUTPATIENT)
Dept: PODIATRY | Facility: CLINIC | Age: 66
End: 2021-10-25
Payer: COMMERCIAL

## 2021-10-25 VITALS
SYSTOLIC BLOOD PRESSURE: 122 MMHG | BODY MASS INDEX: 30.97 KG/M2 | WEIGHT: 174.8 LBS | DIASTOLIC BLOOD PRESSURE: 76 MMHG | HEIGHT: 63 IN

## 2021-10-25 DIAGNOSIS — L60.0 INGROWN TOENAIL OF RIGHT FOOT: ICD-10-CM

## 2021-10-25 PROCEDURE — 11750 EXCISION NAIL&NAIL MATRIX: CPT | Mod: T5 | Performed by: PODIATRIST

## 2021-10-25 PROCEDURE — 99213 OFFICE O/P EST LOW 20 MIN: CPT | Mod: 25 | Performed by: PODIATRIST

## 2021-10-25 RX ORDER — SILVER SULFADIAZINE 10 MG/G
CREAM TOPICAL
Qty: 50 G | Refills: 1 | Status: SHIPPED | OUTPATIENT
Start: 2021-10-25 | End: 2022-03-07

## 2021-10-25 ASSESSMENT — MIFFLIN-ST. JEOR: SCORE: 1306.86

## 2021-10-25 NOTE — PROGRESS NOTES
"Foot & Ankle Surgery  October 25, 2021    CC: \" Foot pain, toenail\"    I was asked to see Hoda LAMIN Gonsalo regarding the chief complaint by:  HARSHAL ARMENTA CNP    HPI:  Pt is a 66 year old female who presents with above complaint.  Ingrown nail issue for approximately 1 month at the lateral right hallux.  No injury noted.  She had a previous partial temporary removal \"long years ago\".  She has tried trimming the nail and soaking in hot water.    ROS:   Pos for CC.  The patient denies current nausea, vomiting, chills, fevers, belly pain, calf pain, chest pain or SOB.  Complete remainder of ROS is otherwise neg.    VITALS:    Vitals:    10/25/21 0926   BP: 122/76   Weight: 80.7 kg (178 lb)   Height: 1.6 m (5' 2.99\")       PMH:    Past Medical History:   Diagnosis Date     Breast cancer metastasized to bone (H)      Lymphedema of left upper extremity        SXHX:    Past Surgical History:   Procedure Laterality Date     APPENDECTOMY       BIOPSY  2014     COLONOSCOPY N/A 2/1/2018    Procedure: COLONOSCOPY;  Colonoscopy;  Surgeon: Phillip Rowe MD;  Location:  GI     ESOPHAGOSCOPY, GASTROSCOPY, DUODENOSCOPY (EGD), COMBINED N/A 2/16/2018    Procedure: COMBINED ESOPHAGOSCOPY, GASTROSCOPY, DUODENOSCOPY (EGD), BIOPSY SINGLE OR MULTIPLE;;  Surgeon: Paty Herman MD;  Location:  GI     ESOPHAGOSCOPY, GASTROSCOPY, DUODENOSCOPY (EGD), COMBINED N/A 2/5/2020    Procedure: ESOPHAGOGASTRODUODENOSCOPY (EGD);  Surgeon: Anthony Alonso MD;  [facility]     INSERT PORT VASCULAR ACCESS Right 9/15/2017    Procedure: INSERT PORT VASCULAR ACCESS;  Central venous chest port placement, right;  Surgeon: Dmitriy Hernandez PA-C;  [facility]     LAPAROSCOPIC CHOLECYSTECTOMY N/A 8/31/2019    Procedure: LAPAROSCOPIC CHOLECYSTECTOMY;  Surgeon: Maldonado Billy MD;  [facility]     MASTECTOMY Left 06/27/2014    and lymph node resection     MASTECTOMY SIMPLE BILATERAL      Mastectomy " 06/27/2014        MEDS:    Current Outpatient Medications   Medication     acetaminophen (TYLENOL) 500 MG tablet     calcium carbonate (OS-TAYLER) 500 MG tablet     famotidine (PEPCID) 20 MG tablet     letrozole (FEMARA) 2.5 MG tablet     melatonin 3 MG tablet     order for DME     oxyCODONE-acetaminophen (PERCOCET) 5-325 MG tablet     VITAMIN D, CHOLECALCIFEROL, PO     No current facility-administered medications for this visit.       ALL:     Allergies   Allergen Reactions     Zometa [Zoledronic Acid] Swelling       FMH:    Family History   Problem Relation Age of Onset     Breast Cancer Mother 45     Lung Cancer Father         smoker     Breast Cancer Sister 61       SocHx:    Social History     Socioeconomic History     Marital status:      Spouse name: Not on file     Number of children: Not on file     Years of education: Not on file     Highest education level: Not on file   Occupational History     Not on file   Tobacco Use     Smoking status: Never Smoker     Smokeless tobacco: Never Used   Substance and Sexual Activity     Alcohol use: No     Drug use: No     Sexual activity: Not Currently     Partners: Male   Other Topics Concern     Parent/sibling w/ CABG, MI or angioplasty before 65F 55M? No   Social History Narrative     Not on file     Social Determinants of Health     Financial Resource Strain:      Difficulty of Paying Living Expenses:    Food Insecurity:      Worried About Running Out of Food in the Last Year:      Ran Out of Food in the Last Year:    Transportation Needs:      Lack of Transportation (Medical):      Lack of Transportation (Non-Medical):    Physical Activity:      Days of Exercise per Week:      Minutes of Exercise per Session:    Stress:      Feeling of Stress :    Social Connections:      Frequency of Communication with Friends and Family:      Frequency of Social Gatherings with Friends and Family:      Attends Confucianism Services:      Active Member of Clubs or Organizations:       Attends Club or Organization Meetings:      Marital Status:    Intimate Partner Violence:      Fear of Current or Ex-Partner:      Emotionally Abused:      Physically Abused:      Sexually Abused:            EXAMINATION:  Gen:   No apparent distress  Neuro:   A&Ox3, no deficits  Psych:    Answering questions appropriately for age and situation with normal affect  Head:    NCAT  Eye:    Visual scanning without deficit  Ear:    Response to auditory stimuli wnl  Lung:    Non-labored breathing on RA noted  Abd:    NTND per patient report  Lymph:    Neg for pitting/non-pitting edema BLE  Vasc:    Pulses palpable, CFT minimally delayed  Neuro:    Light touch sensation intact to all sensory nerve distributions without paresthesias  Derm:    Onychocryptosis lateral right hallux with mild inflammation without signs of infection  MSK:    ROM, strength wnl without limitation, no pain on palpation noted.  Calf:    Neg for redness, swelling or tenderness    Assessment:  66 year old female with recurrent onychocryptosis lateral right hallux with mild paronychia      Plan:  Discussed etiologies, anatomy and options  1.  Recurrent onychocryptosis lateral right hallux with mild paronychia  -I personally reviewed and interpreted the patient's lower extremity history pertinent to today's visit, including imaging/labs, in preparation for initiating a treatment program.  -Regarding the nail, treatment options were discussed.  They elected to proceed with a procedure, Partial permanent avulsion.  See procedure note for details.  Risks that were discussed include but are not limited to infection, wound healing complications, nerve irritation, recurrence of the ingrown nail and the need for further procedures.  Antibiotic:  None needed  --Rx for Silvadene was sent to patient's pharmacy for twice-daily dressing changes  -patient was advised to follow up in 4 weeks for a 30 minute appointment if the wound has failed to progress/heal,  and we'll proceed with an I&D; otherwise, follow up prn    After discussing the procedure, as well as risks, complications and post-procedure instructions, informed consent was obtained.    Anesthesia:  5 cc's of  1% lidocaine plain    Procedure:  After adequate prep, and with anesthesia achieved, a tourni-cot was applied to the involved toe(and removed after bandage application) and  attention was directed to the lateral border of the R hallux where the nail plate was freed from surrounding soft tissue.  The offending border was  using an English Anvil and then removed in total.  The base of the wound was explored and showed no necrotic tissue, purulence or debris.  Phenol was then applied to the base of the wound for 30s x 3, and sufficient isopropyl alcohol was used to irrigate the wound.  The base of the wound/ nail matrix was curetted after each acid application.  A clean dressing was applied loosely to prevent vascular insult.  The patient tolerated the procedure well without complications.    Post-procedural instructions were dispensed and discussed with the patient.  All questions were answered.            Follow up:  prn or sooner with acute issues      Patient's medical history was reviewed today      Aries Alfaro DPM FACFAS FACFAOM  Podiatric Foot & Ankle Surgeon  [place]  [phone]    Disclaimer: This note consists of symbols derived from keyboarding, dictation and/or voice recognition software. As a result, there may be errors in the script that have gone undetected. Please consider this when interpreting information found in this chart.

## 2021-10-25 NOTE — PATIENT INSTRUCTIONS
Thank you for choosing [facility] Podiatry / Foot & Ankle Surgery!    DR MEDINA'S CLINIC:  [facility]   [address]   [place] [address] [phone]      SET UP SURGERY: [phone]   APPOINTMENTS: [phone]   BILLING QUESTIONS: [phone]   FAX NUMBER: [phone]       INGROWN TOENAIL REMOVAL AFTERCARE  1. After the procedure, go home and elevate the foot/feet for the remainder of the day/evening as able. This is to minimize swelling, control pain, and limit post-procedural complications. The pre-procedural injection may cause your toe to be numb anywhere from 1-2 hours.    2. You can take Tylenol, Ibuprofen, Advil, etc as needed for pain if tolerated. Follow label instructions.     3. If you have been given a prescription for antibiotics, take them as instructed and complete the entire prescription.    4. Keep dressing intact until the following morning. Then remove the bandage (you may need to soak it in warm soapy water as the bandage will likely adhere to your skin).    5. Start soaking in warm soapy water for 5-10 minutes twice a day. Wash the toe thoroughly, dry the toe thoroughly. Apply antibiotic wound ointment to base of wound and cover with gauze and Coban dressing (not too tightly) until it stops draining. This may take a few days to weeks, but at that point, you may continue with antibiotic ointment and a band-aid, or you may stop applying a dressing all together. Dressing changes should be done twice daily if you had the permanent/chemical procedure done.    6. You may do activities as tolerated the following day. Find a shoe that is comfortable and minimizes the amount of rubbing on your toe, as this may increase pain, swelling, etc.    7. Monitor for signs of infection. With this procedure, it is common to have mild surrounding redness and drainage. If the redness involves the entire toe or if you notice red streaks on top of your foot, or if you  experience any nausea, vomiting, chills, fevers > 101 degrees, call clinic for a quick appointment.          Flu vaccines are now available at all [facility] clinics and retail pharmacies across the [facility]. Appointments are required for clinic locations. To schedule an appointment online, please log into [employer] or create an account if you are a new user. You can also call [phone], or simply walk in at one of the [facility] retail pharmacy locations.

## 2021-10-25 NOTE — LETTER
"    10/25/2021         RE: Hoda Brush  [address]  [place] [address]        Dear Colleague,    Thank you for referring your patient, Hoda Brush, to the [facility]. Please see a copy of my visit note below.    Foot & Ankle Surgery  October 25, 2021    CC: \" Foot pain, toenail\"    I was asked to see Hoda Brush regarding the chief complaint by:  HARSHAL ARMENTA CNP    HPI:  Pt is a 66 year old female who presents with above complaint.  Ingrown nail issue for approximately 1 month at the lateral right hallux.  No injury noted.  She had a previous partial temporary removal \"long years ago\".  She has tried trimming the nail and soaking in hot water.    ROS:   Pos for CC.  The patient denies current nausea, vomiting, chills, fevers, belly pain, calf pain, chest pain or SOB.  Complete remainder of ROS is otherwise neg.    VITALS:    Vitals:    10/25/21 0926   BP: 122/76   Weight: 80.7 kg (178 lb)   Height: 1.6 m (5' 2.99\")       PMH:    Past Medical History:   Diagnosis Date     Breast cancer metastasized to bone (H)      Lymphedema of left upper extremity        SXHX:    Past Surgical History:   Procedure Laterality Date     APPENDECTOMY       BIOPSY  2014     COLONOSCOPY N/A 2/1/2018    Procedure: COLONOSCOPY;  Colonoscopy;  Surgeon: Phillip Rowe MD;  Location:  GI     ESOPHAGOSCOPY, GASTROSCOPY, DUODENOSCOPY (EGD), COMBINED N/A 2/16/2018    Procedure: COMBINED ESOPHAGOSCOPY, GASTROSCOPY, DUODENOSCOPY (EGD), BIOPSY SINGLE OR MULTIPLE;;  Surgeon: Paty Herman MD;  Location:  GI     ESOPHAGOSCOPY, GASTROSCOPY, DUODENOSCOPY (EGD), COMBINED N/A 2/5/2020    Procedure: ESOPHAGOGASTRODUODENOSCOPY (EGD);  Surgeon: Anthony Alonso MD;  [facility]     INSERT PORT VASCULAR ACCESS Right 9/15/2017    Procedure: INSERT PORT VASCULAR ACCESS;  Central venous chest port placement, right;  Surgeon: Dmitriy Hernandez, " JUSTINE;  [facility]     LAPAROSCOPIC CHOLECYSTECTOMY N/A 8/31/2019    Procedure: LAPAROSCOPIC CHOLECYSTECTOMY;  Surgeon: Maldonado Billy MD;  [facility]     MASTECTOMY Left 06/27/2014    and lymph node resection     MASTECTOMY SIMPLE BILATERAL      Mastectomy 06/27/2014        MEDS:    Current Outpatient Medications   Medication     acetaminophen (TYLENOL) 500 MG tablet     calcium carbonate (OS-TAYLER) 500 MG tablet     famotidine (PEPCID) 20 MG tablet     letrozole (FEMARA) 2.5 MG tablet     melatonin 3 MG tablet     order for DME     oxyCODONE-acetaminophen (PERCOCET) 5-325 MG tablet     VITAMIN D, CHOLECALCIFEROL, PO     No current facility-administered medications for this visit.       ALL:     Allergies   Allergen Reactions     Zometa [Zoledronic Acid] Swelling       FMH:    Family History   Problem Relation Age of Onset     Breast Cancer Mother 45     Lung Cancer Father         smoker     Breast Cancer Sister 61       SocHx:    Social History     Socioeconomic History     Marital status:      Spouse name: Not on file     Number of children: Not on file     Years of education: Not on file     Highest education level: Not on file   Occupational History     Not on file   Tobacco Use     Smoking status: Never Smoker     Smokeless tobacco: Never Used   Substance and Sexual Activity     Alcohol use: No     Drug use: No     Sexual activity: Not Currently     Partners: Male   Other Topics Concern     Parent/sibling w/ CABG, MI or angioplasty before 65F 55M? No   Social History Narrative     Not on file     Social Determinants of Health     Financial Resource Strain:      Difficulty of Paying Living Expenses:    Food Insecurity:      Worried About Running Out of Food in the Last Year:      Ran Out of Food in the Last Year:    Transportation Needs:      Lack of Transportation (Medical):      Lack of Transportation (Non-Medical):    Physical Activity:      Days of Exercise per Week:      Minutes of  Exercise per Session:    Stress:      Feeling of Stress :    Social Connections:      Frequency of Communication with Friends and Family:      Frequency of Social Gatherings with Friends and Family:      Attends Evangelical Services:      Active Member of Clubs or Organizations:      Attends Club or Organization Meetings:      Marital Status:    Intimate Partner Violence:      Fear of Current or Ex-Partner:      Emotionally Abused:      Physically Abused:      Sexually Abused:            EXAMINATION:  Gen:   No apparent distress  Neuro:   A&Ox3, no deficits  Psych:    Answering questions appropriately for age and situation with normal affect  Head:    NCAT  Eye:    Visual scanning without deficit  Ear:    Response to auditory stimuli wnl  Lung:    Non-labored breathing on RA noted  Abd:    NTND per patient report  Lymph:    Neg for pitting/non-pitting edema BLE  Vasc:    Pulses palpable, CFT minimally delayed  Neuro:    Light touch sensation intact to all sensory nerve distributions without paresthesias  Derm:    Onychocryptosis lateral right hallux with mild inflammation without signs of infection  MSK:    ROM, strength wnl without limitation, no pain on palpation noted.  Calf:    Neg for redness, swelling or tenderness    Assessment:  66 year old female with recurrent onychocryptosis lateral right hallux with mild paronychia      Plan:  Discussed etiologies, anatomy and options  1.  Recurrent onychocryptosis lateral right hallux with mild paronychia  -I personally reviewed and interpreted the patient's lower extremity history pertinent to today's visit, including imaging/labs, in preparation for initiating a treatment program.  -Regarding the nail, treatment options were discussed.  They elected to proceed with a procedure, Partial permanent avulsion.  See procedure note for details.  Risks that were discussed include but are not limited to infection, wound healing complications, nerve irritation, recurrence of the  ingrown nail and the need for further procedures.  Antibiotic:  None needed  --Rx for Silvadene was sent to patient's pharmacy for twice-daily dressing changes  -patient was advised to follow up in 4 weeks for a 30 minute appointment if the wound has failed to progress/heal, and we'll proceed with an I&D; otherwise, follow up prn    After discussing the procedure, as well as risks, complications and post-procedure instructions, informed consent was obtained.    Anesthesia:  5 cc's of  1% lidocaine plain    Procedure:  After adequate prep, and with anesthesia achieved, a tourni-cot was applied to the involved toe(and removed after bandage application) and  attention was directed to the lateral border of the R hallux where the nail plate was freed from surrounding soft tissue.  The offending border was  using an English Anvil and then removed in total.  The base of the wound was explored and showed no necrotic tissue, purulence or debris.  Phenol was then applied to the base of the wound for 30s x 3, and sufficient isopropyl alcohol was used to irrigate the wound.  The base of the wound/ nail matrix was curetted after each acid application.  A clean dressing was applied loosely to prevent vascular insult.  The patient tolerated the procedure well without complications.    Post-procedural instructions were dispensed and discussed with the patient.  All questions were answered.            Follow up:  prn or sooner with acute issues      Patient's medical history was reviewed today      Aries Alfaro DPM FACFAS FACFAOM  Podiatric Foot & Ankle Surgeon  [facility]  [phone]    Disclaimer: This note consists of symbols derived from keyboarding, dictation and/or voice recognition software. As a result, there may be errors in the script that have gone undetected. Please consider this when interpreting information found in this chart.            Again, thank you for allowing me to participate  in the care of your patient.        Sincerely,        Aries Alfaro DPM, ERIC

## 2021-10-28 ENCOUNTER — PATIENT OUTREACH (OUTPATIENT)
Dept: GERIATRIC MEDICINE | Facility: CLINIC | Age: 66
End: 2021-10-28

## 2021-10-28 NOTE — PROGRESS NOTES
[facility] Care Coordination Contact    Received after visit chart from care coordinator.  Completed following tasks: Mailed copy of care plan to client and mailed HCD, POC sig sheet w/ANA M and Jabari doyle.   Chey Hanley  Case Management Specialist  [facility]  [phone]

## 2021-10-28 NOTE — LETTER
[facility] Advance Care Planning       Hoda Brush  [address]   [place] [address]      Dear Hoda    You shared with me your interest in receiving information on Advance Care Planning and Health Care Directives. Discussing and making decisions about this part of our health is very important.  A Health Care Directive is a written document that outlines your goals, values, beliefs and choices for health care and medical treatment in the event you are unable to speak for yourself.     We greatly value the opportunity to assist you in documenting your choices and to honor your   wishes. We ve enclosed [place] Health Care Directive to help you get started thinking about your values and goals. We have several options for additional resources:       Health Care Directives and Advance Care Planning resources can be viewed and printed   for free at our web site:  [email or web address].       Free group classes on Advance Care Planning and completing a Health Care Directive are available at multiple locations and times. These classes are led by trained staff who will provide information and guide you through a Health Care Directive.  They can also review, notarize and add your Health Care Directive to your medical record. [place] for a class at [email or web address] or by calling [email or web address] at [phone] or toll free [phone].      COPIES of completed Health Care Directives can be brought or mailed to any of our   locations, including the address listed below. You can also email a copy to [email or web address] .      Email or call me at the contact information listed below for questions, assistance, or to   make an appointment to discuss creating a Health Care Directive. You can also contact   our [facility] Department for questions or assistance.       Sincerely,     Lory Arriaga RN/[facility]  Care Coordinator  [phone] phone /  [phone]  [email or web address]

## 2021-10-28 NOTE — LETTER
October 28, 2021    NATASHA LOUIS  [address]   [place] [address]        Dear Natasha:    At [facility], we are dedicated to improving your health and well-being. Enclosed is the Comprehensive Care Plan that we developed with you on 10/20/2021. Please review the Care Plan carefully.    As a reminder, some of the things we discussed at your visit include:    Your physical and mental health    Ways to reduce falls    Health care needs you may have    Don t forget to contact your care coordinator if you:    Have been hospitalized or plan to be hospitalized     Have had a fall     Have experienced a change in physical health    Are experiencing emotional problems     If you do not agree with your Care Plan, have questions about it, or have experienced a change in your needs, please call me at [phone]. If you are hearing impaired, please call the [place] [employer] at 158 or [phone] (znnffj-jv-bckjei relay service).    Sincerely,        Lory Arriaga RN, MA    E-mail: [email or web address]  Phone: [phone]      [id number] [phone] ([id number]     [id number] (11/18)

## 2021-11-02 ENCOUNTER — ONCOLOGY VISIT (OUTPATIENT)
Dept: ONCOLOGY | Facility: CLINIC | Age: 66
End: 2021-11-02
Attending: PHYSICIAN ASSISTANT
Payer: COMMERCIAL

## 2021-11-02 ENCOUNTER — APPOINTMENT (OUTPATIENT)
Dept: LAB | Facility: CLINIC | Age: 66
End: 2021-11-02
Payer: COMMERCIAL

## 2021-11-02 VITALS
HEART RATE: 84 BPM | WEIGHT: 175 LBS | DIASTOLIC BLOOD PRESSURE: 70 MMHG | RESPIRATION RATE: 18 BRPM | OXYGEN SATURATION: 98 % | TEMPERATURE: 97.7 F | BODY MASS INDEX: 31.01 KG/M2 | SYSTOLIC BLOOD PRESSURE: 117 MMHG

## 2021-11-02 DIAGNOSIS — C50.912 MALIGNANT NEOPLASM OF LEFT FEMALE BREAST, UNSPECIFIED ESTROGEN RECEPTOR STATUS, UNSPECIFIED SITE OF BREAST (H): Primary | ICD-10-CM

## 2021-11-02 DIAGNOSIS — Z91.89 AT RISK FOR CARDIOMYOPATHY: ICD-10-CM

## 2021-11-02 PROCEDURE — 250N000011 HC RX IP 250 OP 636: Performed by: INTERNAL MEDICINE

## 2021-11-02 PROCEDURE — 99214 OFFICE O/P EST MOD 30 MIN: CPT | Performed by: PHYSICIAN ASSISTANT

## 2021-11-02 PROCEDURE — 258N000003 HC RX IP 258 OP 636: Performed by: INTERNAL MEDICINE

## 2021-11-02 PROCEDURE — 250N000011 HC RX IP 250 OP 636: Performed by: PHYSICIAN ASSISTANT

## 2021-11-02 PROCEDURE — 96413 CHEMO IV INFUSION 1 HR: CPT

## 2021-11-02 RX ORDER — HEPARIN SODIUM (PORCINE) LOCK FLUSH IV SOLN 100 UNIT/ML 100 UNIT/ML
5 SOLUTION INTRAVENOUS EVERY 8 HOURS PRN
Status: DISCONTINUED | OUTPATIENT
Start: 2021-11-02 | End: 2021-11-02 | Stop reason: HOSPADM

## 2021-11-02 RX ORDER — HEPARIN SODIUM (PORCINE) LOCK FLUSH IV SOLN 100 UNIT/ML 100 UNIT/ML
5 SOLUTION INTRAVENOUS EVERY 8 HOURS
Status: DISCONTINUED | OUTPATIENT
Start: 2021-11-02 | End: 2021-11-02 | Stop reason: HOSPADM

## 2021-11-02 RX ADMIN — TRASTUZUMAB 450 MG: 150 INJECTION, POWDER, LYOPHILIZED, FOR SOLUTION INTRAVENOUS at 14:46

## 2021-11-02 RX ADMIN — Medication 5 ML: at 15:19

## 2021-11-02 RX ADMIN — SODIUM CHLORIDE 250 ML: 9 INJECTION, SOLUTION INTRAVENOUS at 14:26

## 2021-11-02 RX ADMIN — Medication 5 ML: at 11:55

## 2021-11-02 ASSESSMENT — PAIN SCALES - GENERAL: PAINLEVEL: NO PAIN (0)

## 2021-11-02 NOTE — NURSING NOTE
"Oncology Rooming Note    November 2, 2021 12:13 PM   Hoda Brush is a 66 year old female who presents for:    Chief Complaint   Patient presents with     Blood Draw     Labs drawn via PORT by RN in lab. VS taken.      Oncology Clinic Visit     PT is here for a rtn for Breast Cancer     Initial Vitals: Blood Pressure 117/70   Pulse 84   Temperature 97.7  F (36.5  C) (Oral)   Respiration 18   Weight 79.4 kg (175 lb)   Oxygen Saturation 98%   Body Mass Index 31.01 kg/m   Estimated body mass index is 31.01 kg/m  as calculated from the following:    Height as of 10/25/21: 1.6 m (5' 2.99\").    Weight as of this encounter: 79.4 kg (175 lb). Body surface area is 1.88 meters squared.  No Pain (0) Comment: Data Unavailable   No LMP recorded. Patient is postmenopausal.  Allergies reviewed: Yes  Medications reviewed: Yes    Medications: Medication refills not needed today.  Pharmacy name entered into [facility]: [id number] [place], [place] - [address]    Clinical concerns: none       Alejandra Ibarra MA            "

## 2021-11-02 NOTE — PROGRESS NOTES
Infusion Nursing Note:  Hoda Brush presents today for C71 D1 Herceptin.    Patient seen by provider today: Yes: SEMAJ Turner   present during visit today: Not used.  waiver signed.    Note: Patient arrives feeling well with no further complaints/concerns.      Intravenous Access:  Implanted Port.    Treatment Conditions:  Lab Results   Component Value Date    HGB 12.4 11/02/2021    WBC 8.1 11/02/2021    ANEU 3.1 06/22/2021    ANEUTAUTO 4.1 11/02/2021     11/02/2021      Lab Results   Component Value Date     11/02/2021    POTASSIUM 4.2 11/02/2021    CR 0.66 11/02/2021    TAYLER 9.2 11/02/2021    BILITOTAL 0.3 11/02/2021    ALBUMIN 3.4 11/02/2021    ALT 88 (H) 11/02/2021    AST 59 (H) 11/02/2021     Results reviewed, labs MET treatment parameters, ok to proceed with treatment.  ECHO/MUGA completed 8/30/21  EF 55-60%.      Post Infusion Assessment:  Patient tolerated infusion without incident.  Blood return noted pre and post infusion.  Site patent and intact, free from redness, edema or discomfort.  No evidence of extravasations.  Access discontinued per protocol.       Discharge Plan:   Patient declined prescription refills.  Discharge instructions reviewed with: Patient.  Patient and/or family verbalized understanding of discharge instructions and all questions answered.  AVS to patient via [employer].  Patient will return 11/23 for next appointment.   Patient discharged in stable condition accompanied by: self.  Departure Mode: Ambulatory.      Emily Cullen RN

## 2021-11-02 NOTE — NURSING NOTE
Chief Complaint   Patient presents with     Blood Draw     Labs drawn via PORT by RN in lab. VS taken.      Karina Gant RN

## 2021-11-02 NOTE — PROGRESS NOTES
Hoda is a patient from [facility] and is seen here for continuation of care for her metastatic ER+HER2- breast cancer.  She is a Hindu refugee from [facility] and was initially seen in clinic with her daughter.  The only data we have from [facility] is an English translation of her records.       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  On 02/11/2014, she was seen by an oncologist.  The clinical staging of T4b N2 M1 was noted.   Her breast cancer was on the left side. There was no right breast cancer, confirmed by the patient and her daughter, correcting a possible error in the translated records.  ER was positive in 100% of the cells, AK at 14% and HER2 was 3+ positive.  It appears that this histopathologic information is on the mastectomy specimen. She underwent an MRI for staging of presumptive bone metastases which was performed 03/02/2014.  There were skeletal metastases in the thoracic vertebrae at 12, L1, L3, L5 and S1 vertebral body, ranging in size from 0.7-3.0 cm in size.  Ischial and right femur were also involved, as well as a large iliac mass measuring about 15 cm in the uterus. 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   Radiation Oncology consultation was performed 03/11/2014, and she was given radiation in 1 dose of 6 Gy to the right iliac lesion.        With the initial diagnosis, she initiated treatment with 6 cycles of CAF neoadjuvant chemotherapy 02/14, 07/07, 03/28, 04/18/14, 05/08 and 05/29/14. She also received monthly zoledronic acid.  She then underwent a modified left mastectomy of Palacios type and left lymphadenoectomy on 06/27/2014.   Pathologic examination showed number at [facility] was [id number] showed infiltrative grade 2 ductal cancer with 6 level 1 metastatic lympFollow up with Jossie for visits and trastuzumab on 2-5, 2-26, 3-19 with CBC, CMP.  Echocardiogram on 3-19.  Follow up with me 4-9 with  CBC, CMP, CA27.29 and CEA and with CT CAP on 4-8.h nodes and 3 level 2 metastatic lymph nodes.  The differentiation was intermediate.  The tumor was ER positive 70% of the cells, LA positive in 40% of the cells and HER2 was 3+ by immunohistochemistry and the Ki-67 labeling index was 20%. Her staging after surgery was stage IV, pT4b N2 M1.  I don't see a biopsy of the skeletal metastases.  She then had continuation with chemotherapy with 4 cycles of Herceptin and taxane with monthly zoledronic acid.  Tamoxifen was initiated.  She then had two years of Herceptin and a decision was made not to continue further Herceptin.  She continued on zoledronic acid every 3 months. She was clinically stable. [place] then moved to the [place] new refugee from [facility].  She has now been changed to letrozole.  Denosumab has now been held for new diagnosis of osteonecrosis of the R maxilla.     History of cholecystectomy 2020.    Seen in the ED on 10/4/2021, for acute abdominal pain.  Elevated LFTs, elevated lipase.  Question of stone versus pancreatitis.  She was discharged and followed up with an outpatient gastroenterology clinic.  They recommended clear liquid diet and ERCP.  She is here today for clinical assessment and to ensure stability.      TREATMENT HISTORY:  A. Initial diagnosis with metastatic breast cancer in [facility].  Neoadjuvant CAF x 6.   B. Left mastectomy. Left axillary node dissection.  C.  Radiation in 1 dose to R iliac region.  C. Herceptin for 2 years taxane for a prescribed course then stopped, monthly zoledronic acid.  She had 2 years of Herceptin with tamoxifen added after chemotherapy.  D.  Tamoxifen alone and zoledronic acid every 3 months starting 2014.  Letrozole was started   E.  [place]  We restarted Herceptin every 3 weeks and continued tamoxifen. Bone targeted agent changed to denosumab every 9 weeks. Zometa discontinued 2017.  Tamoxifen was changed to letrozole August 2019.    F.  Xgeva  discontinued 12-17-19 because of dental issues.   G.  J+J vaccine March, 2021.  REJI.  Was on Zometa once May 11.  Reaction with eye lid swelling. Discontinued Zometal  H.  Restart Xgeva 6-22-21.  Continuing the trastuzumab and letrozole.  Both tolerated well.       INTERVAL HISTORY  Today she states that she has been feeling better.  Her pain when she was seen on 10/4 was 8 out of 10, her current pain is 1 out of 10.  She has not been needing to take anything for the pain including Percocet or Tylenol over the last 3 days.  She has been maintaining on a clear liquid diet, and does not feel dizzy, lightheaded.  She has been drinking over 64 ounces of fluids, including broths and Pedialyte.  Her bowels have been moving regularly.  She is not having any nausea or vomiting.  She has not had any fevers body aches or chills.  She has not noticed any discoloration or yellowness to her skin.  She has not had any chest pain or shortness of breath.  No lower extremity swelling or pain.     REVIEW OF SYSTEMS:  She denies fevers or chills, cough, chest pain, shortness of breath, nausea or vomiting, constipation, diarrhea, bone pain, or headache.  She does have a chronic low back pain.  A 10 point review of systems is negative.     PHYSICAL EXAM:  /70   Pulse 84   Temp 97.7  F (36.5  C) (Oral)   Resp 18   Wt 79.4 kg (175 lb)   SpO2 98%   BMI 31.01 kg/m      Wt Readings from Last 4 Encounters:   11/02/21 79.4 kg (175 lb)   10/25/21 79.3 kg (174 lb 12.8 oz)   10/21/21 79.8 kg (176 lb)   10/12/21 79.2 kg (174 lb 9.6 oz)       VS: Above vitals reviewed, stable without concerns   General: Resting comfortably in no acute distress  Head: Normocephalic, atraumatic   Heart: Regular rate and rhythm, no murmurs  Lung: Normal respiratory effort. Clear to auscultation bilaterally. No wheezes, rhonchi, or crackles   Abdomen: +Bowel Sounds, soft. Mild TTP over epigastrum, No rebound, guarding or rigidity. No palpable masses  Neuro:  AAO x3. Gait is steady. Moving all extremities   Extremities: No lower extremity edema  Skin: Warm, dry, intact. No rashes, no suspicious lesions. No petechia or bruising noted       Labs  Most Recent 3 CBC's:  Recent Labs   Lab Test 11/02/21  1206 10/12/21  1245 10/08/21  0904   WBC 8.1 8.0 10.7   HGB 12.4 11.8 12.6   MCV 94 94 94    313 220    Most Recent 3 BMP's:  Recent Labs   Lab Test 10/12/21  1245 10/08/21  0904 10/04/21  1936    142 141   POTASSIUM 4.0 4.0 3.8   CHLORIDE 110* 106 108   CO2 28 27 26   BUN 12 12 10   CR 0.79 0.81 0.77   ANIONGAP 1* 9 7   TAYLER 8.9 8.6 8.9   GLC 84 107* 98    Most Recent 2 LFT's:  Recent Labs   Lab Test 10/12/21  1245 10/08/21  0904   * 82*   * 194*   ALKPHOS 74 101   BILITOTAL 0.4 0.8      I reviewed the above labs today.          Imaging   CT A/P 10/4  IMPRESSION:   1.  Mild extrahepatic biliary dilatation without a definite calcified gallstone present. This is of etiology indeterminate. MRCP may be of further use in characterizing this.  2.  Otherwise no acute process demonstrated in the abdomen and pelvis.          ASSESSMENT AND PLAN:     1.  Hoda Brush is a 66-year-old woman with a history of ER-positive, AR-positive, HER2 positive breast cancer.  She is from [facility], formally from [facility], and came to live in the [place] with [place].  She is here for continuation of every 3-week Herceptin, which she has bee no.  N receiving along with tamoxifen daily.  The tumor is ER positive, AR positive and HER2 positive.  She had metastatic disease at the time of presentation with bone-only metastases by report.  She underwent neoadjuvant CAF, had a left mastectomy, left axillary lymph node dissection, radiation to the right hip, which included the right iliac region where she had metastatic disease.  She was initially on tamoxifen but then was switched to letrozole.  She continues on this and every 3-week trastuzumab.  She has had no  evidence of disease progression for the last 3 years.  We have continued Herceptin every 3 weeks as well as daily letrozole.   -- Tumor markers have been stable   -CT A/P 10/4 is stable  -Will add CT chest later next week for full restaging  - Has follow up for infusion and appointment with Dr. Aguiar for Tuesday, keep this appointment       2.  Elevated LFTs, lipase, with abdominal pain  -Seen in the ED 10/4,   --symptomatically and clinically improving today, along with improvement of LFTs and lipase.  --Dr. Aguiar previously discussed with GI team, and there is plan for an ERCP on Monday.  Reached out to GI team and they are setting this up   --Continue clears for now as she is tolerating this well.  --Reviewed if she were to develop worsening abdominal pain, fevers, change in symptoms she should seek treatment over the weekend again.      Patient will call in the interim with any questions or concerns. They voice understanding and agree with the above plan.     35 minutes spent on the date of the encounter doing chart review, review of test results, interpretation of tests, patient visit and documentation     Jamee Mckeon PA-C

## 2021-11-02 NOTE — PROGRESS NOTES
Hoda is a patient from [facility] and is seen here for continuation of care for her metastatic ER+HER2- breast cancer.  She is a Y[employer]an[employer] refugee from [facility] and was initially seen in clinic with her daughter.  The only data we have from [facility] is an English translation of her records.       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  On 02/11/2014, she was seen by an oncologist.  The clinical staging of T4b N2 M1 was noted.   Her breast cancer was on the left side. There was no right breast cancer, confirmed by the patient and her daughter, correcting a possible error in the translated records.  ER was positive in 100% of the cells, NJ at 14% and HER2 was 3+ positive.  It appears that this histopathologic information is on the mastectomy specimen. She underwent an MRI for staging of presumptive bone metastases which was performed 03/02/2014.  There were skeletal metastases in the thoracic vertebrae at 12, L1, L3, L5 and S1 vertebral body, ranging in size from 0.7-3.0 cm in size.  Ischial and right femur were also involved, as well as a large iliac mass measuring about 15 cm in the uterus. 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   Radiation Oncology consultation was performed 03/11/2014, and she was given radiation in 1 dose of 6 Gy to the right iliac lesion.        With the initial diagnosis, she initiated treatment with 6 cycles of CAF neoadjuvant chemotherapy 02/14, 07/07, 03/28, 04/18/14, 05/08 and 05/29/14. She also received monthly zoledronic acid.  She then underwent a modified left mastectomy of Palacios type and left lymphadenoectomy on 06/27/2014.   Pathologic examination showed number at [facility] was [id number] showed infiltrative grade 2 ductal cancer with 6 level 1 metastatic lympFollow up with Jossie for visits and trastuzumab on 2-5, 2-26, 3-19 with CBC, CMP.  Echocardiogram on 3-19.  Follow up with me 4-9 with  CBC, CMP, CA27.29 and CEA and with CT CAP on 4-8.h nodes and 3 level 2 metastatic lymph nodes.  The differentiation was intermediate.  The tumor was ER positive 70% of the cells, ME positive in 40% of the cells and HER2 was 3+ by immunohistochemistry and the Ki-67 labeling index was 20%. Her staging after surgery was stage IV, pT4b N2 M1.  I don't see a biopsy of the skeletal metastases.  She then had continuation with chemotherapy with 4 cycles of Herceptin and taxane with monthly zoledronic acid.  Tamoxifen was initiated.  She then had two years of Herceptin and a decision was made not to continue further Herceptin.  She continued on zoledronic acid every 3 months. She was clinically stable. [place] then moved [place] the [place] new refugee from [facility].  She has now been changed to letrozole.  Denosumab has now been held for new diagnosis of osteonecrosis of the R maxilla.     History of cholecystectomy 2020.    Seen in the ED on 10/4/2021, for acute abdominal pain.  Elevated LFTs, elevated lipase.  Question of stone versus pancreatitis.  She was discharged and followed up with an outpatient gastroenterology clinic.  They recommended clear liquid diet and ERCP.  She is here today for clinical assessment and to ensure stability.      TREATMENT HISTORY:  A. Initial diagnosis with metastatic breast cancer in [facility].  Neoadjuvant CAF x 6.   B. Left mastectomy. Left axillary node dissection.  C.  Radiation in 1 dose to R iliac region.  C. Herceptin for 2 years taxane for a prescribed course then stopped, monthly zoledronic acid.  She had 2 years of Herceptin with tamoxifen added after chemotherapy.  D.  Tamoxifen alone and zoledronic acid every 3 months starting 2014.  Letrozole was started   E.  [place]  We restarted Herceptin every 3 weeks and continued tamoxifen. Bone targeted agent changed to denosumab every 9 weeks. Zometa discontinued 2017.  Tamoxifen was changed to letrozole August 2019.    F.  Xgeva  discontinued 12-17-19 because of dental issues.   G.  J+J vaccine March, 2021.  REJI.  Was on Zometa once May 11.  Reaction with eye lid swelling. Discontinued Zometal  H.  Restart Xgeva 6-22-21.  Continuing the trastuzumab and letrozole.  Both tolerated well.       INTERVAL HISTORY  Today, she states that she has been doing very well over the last few weeks.  She has had no recurrent abdominal pain.  She has been eating and drinking regularly.  She denies any fevers body aches or chills.  No lower extremity swelling.  No chest pain.  No new lumps or bumps, no new aches or pains.  No headaches or double or blurry vision.  Energy level has been stable.     REVIEW OF SYSTEMS:  She denies fevers or chills, cough, chest pain, shortness of breath, nausea or vomiting, constipation, diarrhea, bone pain, or headache.  She does have a chronic low back pain.  A 10 point review of systems is negative.     PHYSICAL EXAM:  /70   Pulse 84   Temp 97.7  F (36.5  C) (Oral)   Resp 18   Wt 79.4 kg (175 lb)   SpO2 98%   BMI 31.01 kg/m      Wt Readings from Last 4 Encounters:   11/02/21 79.4 kg (175 lb)   10/25/21 79.3 kg (174 lb 12.8 oz)   10/21/21 79.8 kg (176 lb)   10/12/21 79.2 kg (174 lb 9.6 oz)       VS: Above vitals reviewed, stable without concerns   General: Resting comfortably in no acute distress  Head: Normocephalic, atraumatic   Heart: Regular rate and rhythm, no murmurs  Lung: Normal respiratory effort. Clear to auscultation bilaterally. No wheezes, rhonchi, or crackles   Abdomen: +Bowel Sounds, soft. Mild TTP over epigastrum, No rebound, guarding or rigidity. No palpable masses  Neuro: AAO x3. Gait is steady. Moving all extremities   Extremities: No lower extremity edema  Skin: Warm, dry, intact. No rashes, no suspicious lesions. No petechia or bruising noted       Labs  Most Recent 3 CBC's:  Recent Labs   Lab Test 11/02/21  1206 10/12/21  1245 10/08/21  0904   WBC 8.1 8.0 10.7   HGB 12.4 11.8 12.6   MCV 94 94 94     313 220    Most Recent 3 BMP's:  Recent Labs   Lab Test 11/02/21  1206 10/12/21  1245 10/08/21  0904    139 142   POTASSIUM 4.2 4.0 4.0   CHLORIDE 108 110* 106   CO2 29 28 27   BUN 17 12 12   CR 0.66 0.79 0.81   ANIONGAP 3 1* 9   TAYLER 9.2 8.9 8.6   GLC 83 84 107*    Most Recent 2 LFT's:  Recent Labs   Lab Test 11/02/21  1206 10/12/21  1245   AST 59* 102*   ALT 88* 154*   ALKPHOS 52 74   BILITOTAL 0.3 0.4      I reviewed the above labs today.          Imaging   CT CAP 10/18  IMPRESSION: In this patient with history of breast cancer, the current  scan compared to CT chest abdomen pelvis 6/21/2021 and CT abdomen  pelvis 10/4/2021 shows:     1. Grossly unchanged multiple osseous metastatic lesions.  2. Stable subcentimeter pulmonary nodules. No new pulmonary nodule.  May consider attention on follow-up.  3. No new metastatic lesion within the chest, abdomen and pelvis.  4. Resolution of mild intrahepatic biliary ductal dilatation compared  to prior CT 10/4/2021   5. Bulky myomatous uterus, may consider pelvic ultrasound for  evaluation if clinically desired       ASSESSMENT AND PLAN:     1.  Hoda Brush is a 66-year-old woman with a history of ER-positive, MN-positive, HER2 positive breast cancer.  She is from [facility], formally from [facility], and came [place] live in the [place].  She is here for continuation of every 3-week Herceptin, which she has bee no.  N receiving along with tamoxifen daily.  The tumor is ER positive, MN positive and HER2 positive.  She had metastatic disease at the time of presentation with bone-only metastases by report.  She underwent neoadjuvant CAF, had a left mastectomy, left axillary lymph node dissection, radiation to the right hip, which included the right iliac region where she had metastatic disease.  She was initially on tamoxifen but then was switched to letrozole.  She continues on this and every 3-week trastuzumab.  She has had no evidence of  disease progression for the last 3 years.  We have continued Herceptin every 3 weeks as well as daily letrozole.   -- Tumor markers have been stable   - CT CAP 10/18 showed stable disease   -- Proceed with herceptin today. Continue letrozole   -- Repeat CT CAP 12/13  -- Echo due end of November 2.  Elevated LFTs, lipase, with abdominal pain- Resolved   -- Has no abdominal pain today, LFTs and bili continue to improve   -- Has MRCP scheduled for 11/4  -- Per Dr. Aguiar's note, should have follow up with GI team following MRCP. Not yet scheduled, messaged scheduling today    3. Bone health  - Xgeva due 11/23  - Continue Calcium and Vitamin D      Patient will call in the interim with any questions or concerns. They voice understanding and agree with the above plan.     25 minutes spent on the date of the encounter doing chart review, review of test results, interpretation of tests, patient visit and documentation     Jamee Mckeon PA-C

## 2021-11-04 ENCOUNTER — HOSPITAL ENCOUNTER (OUTPATIENT)
Facility: CLINIC | Age: 66
Discharge: HOME | End: 2021-11-04
Admitting: RADIOLOGY
Payer: COMMERCIAL

## 2021-11-04 ENCOUNTER — HOSPITAL ENCOUNTER (OUTPATIENT)
Dept: MRI IMAGING | Facility: CLINIC | Age: 66
End: 2021-11-04
Attending: INTERNAL MEDICINE
Payer: COMMERCIAL

## 2021-11-04 DIAGNOSIS — C50.912 MALIGNANT NEOPLASM OF LEFT FEMALE BREAST, UNSPECIFIED ESTROGEN RECEPTOR STATUS, UNSPECIFIED SITE OF BREAST (H): ICD-10-CM

## 2021-11-04 PROCEDURE — A9585 GADOBUTROL INJECTION: HCPCS | Performed by: INTERNAL MEDICINE

## 2021-11-04 PROCEDURE — 250N000011 HC RX IP 250 OP 636: Performed by: RADIOLOGY

## 2021-11-04 PROCEDURE — 999N000154 HC STATISTIC RADIOLOGY XRAY, US, CT, MAR, NM

## 2021-11-04 PROCEDURE — 74183 MRI ABD W/O CNTR FLWD CNTR: CPT

## 2021-11-04 PROCEDURE — 255N000002 HC RX 255 OP 636: Performed by: INTERNAL MEDICINE

## 2021-11-04 RX ORDER — GADOBUTROL 604.72 MG/ML
8 INJECTION INTRAVENOUS ONCE
Status: COMPLETED | OUTPATIENT
Start: 2021-11-04 | End: 2021-11-04

## 2021-11-04 RX ORDER — HEPARIN SODIUM,PORCINE 10 UNIT/ML
5-10 VIAL (ML) INTRAVENOUS
Status: DISCONTINUED | OUTPATIENT
Start: 2021-11-04 | End: 2021-11-04 | Stop reason: HOSPADM

## 2021-11-04 RX ORDER — HEPARIN SODIUM,PORCINE 10 UNIT/ML
5-10 VIAL (ML) INTRAVENOUS EVERY 24 HOURS
Status: DISCONTINUED | OUTPATIENT
Start: 2021-11-04 | End: 2021-11-04 | Stop reason: HOSPADM

## 2021-11-04 RX ORDER — HEPARIN SODIUM (PORCINE) LOCK FLUSH IV SOLN 100 UNIT/ML 100 UNIT/ML
5-10 SOLUTION INTRAVENOUS
Status: DISCONTINUED | OUTPATIENT
Start: 2021-11-04 | End: 2021-11-04 | Stop reason: HOSPADM

## 2021-11-04 RX ADMIN — GADOBUTROL 8 ML: 604.72 INJECTION INTRAVENOUS at 06:50

## 2021-11-04 RX ADMIN — HEPARIN SODIUM (PORCINE) LOCK FLUSH IV SOLN 100 UNIT/ML 5 ML: 100 SOLUTION at 08:04

## 2021-11-04 NOTE — PROGRESS NOTES
Port accessed without difficulty.  Patient tolerated well.    0805  Port de-accessed without difficulty.  Patient tolerated well.

## 2021-11-08 ENCOUNTER — TELEPHONE (OUTPATIENT)
Dept: ONCOLOGY | Facility: CLINIC | Age: 66
End: 2021-11-08
Payer: COMMERCIAL

## 2021-11-08 NOTE — TELEPHONE ENCOUNTER
I called Laura and discussed that her mother had a normal MRCP with no stones.  Plan would be diet low in saturated fat and slow weight reduction.  Calcium supplementation as previously recommended.     Lisandro Aguiar MD

## 2021-11-16 ENCOUNTER — IMMUNIZATION (OUTPATIENT)
Dept: NURSING | Facility: CLINIC | Age: 66
End: 2021-11-16
Payer: COMMERCIAL

## 2021-11-16 PROCEDURE — 0004A PR COVID VAC PFIZER DIL RECON 30 MCG/0.3 ML IM: CPT

## 2021-11-16 PROCEDURE — 91300 PR COVID VAC PFIZER DIL RECON 30 MCG/0.3 ML IM: CPT

## 2021-11-17 ENCOUNTER — TELEPHONE (OUTPATIENT)
Dept: PODIATRY | Facility: CLINIC | Age: 66
End: 2021-11-17
Payer: COMMERCIAL

## 2021-11-17 NOTE — TELEPHONE ENCOUNTER
Daughter, Shyla returns call and also conferences the patient in for the call.   Patient is having redness at the site where acid was applied at base of nail. Redness has stayed the same. It is mildly tender to the touch only. There is no drainage.  Patient states the redness is blancheable.  Patient is concerned it isn't healed yet since she is currently undergoing chemotherapy. Discussed that when acid is used it can take 6-7 weeks for the area to heal. Everything she is describing sounds like normal healing. Suggested she continue with soaking and Silvadene for one more week and then stop if no drainage and symptoms are improving.   Scheduled a follow up visit on 11/22/21 and will cancel if not needed.   They verbalized understanding.     PITER Guerrero RN

## 2021-11-17 NOTE — TELEPHONE ENCOUNTER
Phone call to daughter. She is currently in a meeting and could only give a few details. Patient is soaking her foot twice daily and using Silvadene. There is still redness present with some white areas.   She asked that writer call her back after 12:00. Also asked that she call back sooner if able. She verbalized understanding.     PITER Guerrero RN

## 2021-11-17 NOTE — TELEPHONE ENCOUNTER
Reason for call:  Message from Shyla, regarding her Mother's toe. Says it appears infected. Tried to make an appointment for today but there are none available until the 29th. States Hoda is also an Oncology patient and is very concerned for infection.  Please call back.    Phone number to reach patient:  Other phone number:  [phone]*    Best Time:  -    Can we leave a detailed message on this number?  NO

## 2021-11-22 ENCOUNTER — OFFICE VISIT (OUTPATIENT)
Dept: PODIATRY | Facility: CLINIC | Age: 66
End: 2021-11-22
Payer: COMMERCIAL

## 2021-11-22 VITALS
WEIGHT: 175 LBS | SYSTOLIC BLOOD PRESSURE: 108 MMHG | HEIGHT: 63 IN | BODY MASS INDEX: 31.01 KG/M2 | DIASTOLIC BLOOD PRESSURE: 68 MMHG

## 2021-11-22 DIAGNOSIS — L03.031 PARONYCHIA, TOE, RIGHT: Primary | ICD-10-CM

## 2021-11-22 PROCEDURE — 99213 OFFICE O/P EST LOW 20 MIN: CPT | Performed by: PODIATRIST

## 2021-11-22 ASSESSMENT — MIFFLIN-ST. JEOR: SCORE: 1307.76

## 2021-11-22 NOTE — PROGRESS NOTES
Foot & Ankle Surgery   November 22, 2021    S:  Pt is seen today for evaluation of right hallux approximately 4 weeks after partial permanent avulsion lateral right hallux nail secondary to recurrent onychocryptosis and paronychia.  She was given post procedure instructions for twice daily soaks and dressing changes as well as a prescription for Silvadene for twice daily topical application.  She was advised to follow-up in 4 weeks if there is concerns about limited healing.    There were no vitals filed for this visit.'      ROS - Pos for CC.  Patient denies current nausea, vomiting, chills, fevers, belly pain, calf pain, chest pain or SOB.  Complete remainder of ROS it otherwise neg.      PE:  Gen:   No apparent distress  Eye:    Visual scanning without deficit  Ear:    Response to auditory stimuli wnl  Lung:    Non-labored breathing on RA noted  Abd:    NTND per patient report  Lymph:    Neg for pitting/non-pitting edema BLE  Vasc:    Pulses palpable, CFT minimally delayed  Neuro:    Light touch sensation intact to all sensory nerve distributions without paresthesias  Derm:    Lateral right hallux presents with very mild inflammation.  There is no drainage.  There is slight tenderness with palpation.  This otherwise appears to be healing uneventfully without signs of infection  MSK:    ROM, strength wnl without limitation, no pain on palpation noted.  Calf:    Neg for redness, swelling or tenderness      Assessment:  66 year old female with typical healing lateral right hallux approximately 4 weeks after P&A      Plan:  Discussed etiologies, anatomy and options  1.  Typical healing lateral right hallux approximately 4 weeks after P&A  -We discussed that on those instances where a digital block and curettage of the wound are indicated, the wounds typically looks much worse than hers does today.  There is mild inflammation and slight discomfort with minimal continued drainage from the area.  I advise she continue  with the twice a day soaks and Silvadene bandages until inflammation, drainage and discomfort have resolved.  Certainly can consider an I&D of the area if no further progression is seen but for now the wound appears stable enough to continue with home therapies.    Follow up: As needed or sooner with acute issues           Aries Alfaro DPM FACFAS FACFAOM  Podiatric Foot & Ankle Surgeon  [facility]  [phone]    Disclaimer: This note consists of symbols derived from keyboarding, dictation and/or voice recognition software. As a result, there may be errors in the script that have gone undetected. Please consider this when interpreting information found in this chart.

## 2021-11-22 NOTE — PROGRESS NOTES
Hoda is a patient from [facility] and is seen here for continuation of care for her metastatic ER+HER2- breast cancer.  She is a [employer] refugee from [facility] and was initially seen in clinic with her daughter.  The only data we have from [facility] is an English translation of her records.       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  On 02/11/2014, she was seen by an oncologist.  The clinical staging of T4b N2 M1 was noted.   Her breast cancer was on the left side. There was no right breast cancer, confirmed by the patient and her daughter, correcting a possible error in the translated records.  ER was positive in 100% of the cells, MN at 14% and HER2 was 3+ positive.  It appears that this histopathologic information is on the mastectomy specimen. She underwent an MRI for staging of presumptive bone metastases which was performed 03/02/2014.  There were skeletal metastases in the thoracic vertebrae at 12, L1, L3, L5 and S1 vertebral body, ranging in size from 0.7-3.0 cm in size.  Ischial and right femur were also involved, as well as a large iliac mass measuring about 15 cm in the uterus. 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   Radiation Oncology consultation was performed 03/11/2014, and she was given radiation in 1 dose of 6 Gy to the right iliac lesion.        With the initial diagnosis, she initiated treatment with 6 cycles of CAF neoadjuvant chemotherapy 02/14, 07/07, 03/28, 04/18/14, 05/08 and 05/29/14. She also received monthly zoledronic acid.  She then underwent a modified left mastectomy of Palacios type and left lymphadenoectomy on 06/27/2014.   Pathologic examination showed number at [facility] was [id number] showed infiltrative grade 2 ductal cancer with 6 level 1 metastatic lympFollow up with Jossie for visits and trastuzumab on 2-5, 2-26, 3-19 with CBC, CMP.  Echocardiogram on 3-19.  Follow up with me 4-9 with  CBC, CMP, CA27.29 and CEA and with CT CAP on 4-8.h nodes and 3 level 2 metastatic lymph nodes.  The differentiation was intermediate.  The tumor was ER positive 70% of the cells, CT positive in 40% of the cells and HER2 was 3+ by immunohistochemistry and the Ki-67 labeling index was 20%. Her staging after surgery was stage IV, pT4b N2 M1.  I don't see a biopsy of the skeletal metastases.  She then had continuation with chemotherapy with 4 cycles of Herceptin and taxane with monthly zoledronic acid.  Tamoxifen was initiated.  She then had two years of Herceptin and a decision was made not to continue further Herceptin.  She continued on zoledronic acid every 3 months. She was clinically stable. [place] then [place] the [place] new [place] from [facility] [place].  She has now been changed to letrozole.  Denosumab has now been held for new diagnosis of osteonecrosis of the R maxilla.     History of cholecystectomy 2020.    Seen in the ED on 10/4/2021, for acute abdominal pain.  Elevated LFTs, elevated lipase.  Question of stone versus pancreatitis.  She was discharged and followed up with an outpatient gastroenterology clinic.  They recommended clear liquid diet and ERCP.  She is here today for clinical assessment and to ensure stability.      TREATMENT HISTORY:  A. Initial diagnosis with metastatic breast cancer in [facility].  Neoadjuvant CAF x 6.   B. Left mastectomy. Left axillary node dissection.  C.  Radiation in 1 dose to R iliac region.  C. Herceptin for 2 years taxane for a prescribed course then stopped, monthly zoledronic acid.  She had 2 years of Herceptin with tamoxifen added after chemotherapy.  D.  Tamoxifen alone and zoledronic acid every 3 months starting 2014.  Letrozole was started   E.  [place]  We restarted Herceptin every 3 weeks and continued tamoxifen. Bone targeted agent changed to denosumab every 9 weeks. Zometa discontinued 2017.  Tamoxifen was changed to letrozole August 2019.    F.  Xgeva  discontinued 12-17-19 because of dental issues.   G.  J+J vaccine March, 2021.  REJI.  Was on Zometa once May 11.  Reaction with eye lid swelling. Discontinued Zometal  H.  Restart Xgeva 6-22-21.  Continuing the trastuzumab and letrozole.  Both tolerated well.       INTERVAL HISTORY  Today, she states that she is overall been doing well.  She does note that she has had a difficulty time sleeping over the last couple months.  She is waking up early in the morning, around 1 or 2 AM, and staying up until 5 AM.  She has tried melatonin, without any improvement.  She feels that quality of life.  Denies any headaches or double or blurry vision.  No new lumps or bumps.  No new aches or pains.  No chest pain or shortness of breath.  No swelling in her lower legs.  She has been eating and drinking regularly, bowels are moving regularly.  Energy level has been stable.     REVIEW OF SYSTEMS:  She denies fevers or chills, cough, chest pain, shortness of breath, nausea or vomiting, constipation, diarrhea, bone pain, or headache.  She does have a chronic low back pain.  A 10 point review of systems is negative.     PHYSICAL EXAM:  There were no vitals taken for this visit.    Wt Readings from Last 4 Encounters:   11/02/21 79.4 kg (175 lb)   10/25/21 79.3 kg (174 lb 12.8 oz)   10/21/21 79.8 kg (176 lb)   10/12/21 79.2 kg (174 lb 9.6 oz)       VS: Above vitals reviewed, stable without concerns   General: Resting comfortably in no acute distress  Head: Normocephalic, atraumatic   Heart: Regular rate and rhythm, no murmurs  Lung: Normal respiratory effort. Clear to auscultation bilaterally. No wheezes, rhonchi, or crackles   Neuro: AAO x3. Gait is steady. Moving all extremities   Extremities: No lower extremity edema  Skin: Warm, dry, intact. No rashes, no suspicious lesions. No petechia or bruising noted       Labs  Most Recent 3 CBC's:  Recent Labs   Lab Test 11/02/21  1206 10/12/21  1245 10/08/21  0904   WBC 8.1 8.0 10.7   HGB 12.4  11.8 12.6   MCV 94 94 94    313 220    Most Recent 3 BMP's:  Recent Labs   Lab Test 11/02/21  1206 10/12/21  1245 10/08/21  0904    139 142   POTASSIUM 4.2 4.0 4.0   CHLORIDE 108 110* 106   CO2 29 28 27   BUN 17 12 12   CR 0.66 0.79 0.81   ANIONGAP 3 1* 9   TAYLER 9.2 8.9 8.6   GLC 83 84 107*    Most Recent 2 LFT's:  Recent Labs   Lab Test 11/02/21  1206 10/12/21  1245   AST 59* 102*   ALT 88* 154*   ALKPHOS 52 74   BILITOTAL 0.3 0.4      I reviewed the above labs today.          Imaging   CT CAP 10/18  IMPRESSION: In this patient with history of breast cancer, the current  scan compared to CT chest abdomen pelvis 6/21/2021 and CT abdomen  pelvis 10/4/2021 shows:     1. Grossly unchanged multiple osseous metastatic lesions.  2. Stable subcentimeter pulmonary nodules. No new pulmonary nodule.  May consider attention on follow-up.  3. No new metastatic lesion within the chest, abdomen and pelvis.  4. Resolution of mild intrahepatic biliary ductal dilatation compared  to prior CT 10/4/2021   5. Bulky myomatous uterus, may consider pelvic ultrasound for  evaluation if clinically desired       ASSESSMENT AND PLAN:     1.  Hoda Brush is a 66-year-old woman with a history of ER-positive, AZ-positive, HER2 positive breast cancer.  She is from [facility], formally from [facility], and came to live in the [place] with [place] daughter.  The tumor is ER positive, AZ positive and HER2 positive.  She had metastatic disease at the time of presentation with bone-only metastases by report.  She underwent neoadjuvant CAF, had a left mastectomy, left axillary lymph node dissection, radiation to the right hip, which included the right iliac region where she had metastatic disease.  She was initially on tamoxifen but then was switched to letrozole.  She continues on this and every 3-week trastuzumab.  She has had no evidence of disease progression for the last 3 years.  We have continued Herceptin every 3 weeks as well as  daily letrozole.   -- Tumor markers have been stable, pending today    - CT CAP 10/18 showed stable disease   -- Proceed with herceptin today. Continue letrozole   -- Repeat CT CAP 12/13  -- Echo due end of November, scheduled       2.  Elevated LFTs, lipase, with abdominal pain- Resolved   -- Has no abdominal pain today, LFTs and bili continue to improve   -- MRCP 11/4 normal other than hepatic steatosis     3. Bone health  - Xgeva due 11/23  - Continue Calcium and Vitamin D    4. Insomnia  - Can start trazodone 25mg at bedtime. Discussed drowsiness as a side effect       Patient will call in the interim with any questions or concerns. They voice understanding and agree with the above plan.     25 minutes spent on the date of the encounter doing chart review, review of test results, interpretation of tests, patient visit and documentation     Jaeme Mckeon PA-C

## 2021-11-23 ENCOUNTER — ONCOLOGY VISIT (OUTPATIENT)
Dept: ONCOLOGY | Facility: CLINIC | Age: 66
End: 2021-11-23
Attending: PHYSICIAN ASSISTANT
Payer: COMMERCIAL

## 2021-11-23 ENCOUNTER — LAB (OUTPATIENT)
Dept: LAB | Facility: CLINIC | Age: 66
End: 2021-11-23
Attending: PHYSICIAN ASSISTANT
Payer: COMMERCIAL

## 2021-11-23 VITALS
SYSTOLIC BLOOD PRESSURE: 129 MMHG | RESPIRATION RATE: 16 BRPM | DIASTOLIC BLOOD PRESSURE: 71 MMHG | OXYGEN SATURATION: 96 % | WEIGHT: 176.63 LBS | TEMPERATURE: 98.3 F | HEART RATE: 64 BPM | BODY MASS INDEX: 31.3 KG/M2

## 2021-11-23 DIAGNOSIS — G47.00 INSOMNIA, UNSPECIFIED TYPE: ICD-10-CM

## 2021-11-23 DIAGNOSIS — C79.51 BONE METASTASIS: ICD-10-CM

## 2021-11-23 DIAGNOSIS — C50.912 MALIGNANT NEOPLASM OF LEFT FEMALE BREAST, UNSPECIFIED ESTROGEN RECEPTOR STATUS, UNSPECIFIED SITE OF BREAST (H): Primary | ICD-10-CM

## 2021-11-23 LAB
ALBUMIN SERPL-MCNC: 3.1 G/DL (ref 3.4–5)
ALP SERPL-CCNC: 51 U/L (ref 40–150)
ALT SERPL W P-5'-P-CCNC: 82 U/L (ref 0–50)
ANION GAP SERPL CALCULATED.3IONS-SCNC: 6 MMOL/L (ref 3–14)
AST SERPL W P-5'-P-CCNC: 58 U/L (ref 0–45)
BASOPHILS # BLD AUTO: 0 10E3/UL (ref 0–0.2)
BASOPHILS NFR BLD AUTO: 0 %
BILIRUB SERPL-MCNC: 0.3 MG/DL (ref 0.2–1.3)
BUN SERPL-MCNC: 12 MG/DL (ref 7–30)
CALCIUM SERPL-MCNC: 8.7 MG/DL (ref 8.5–10.1)
CHLORIDE BLD-SCNC: 109 MMOL/L (ref 94–109)
CO2 SERPL-SCNC: 28 MMOL/L (ref 20–32)
CREAT SERPL-MCNC: 0.69 MG/DL (ref 0.52–1.04)
EOSINOPHIL # BLD AUTO: 0.4 10E3/UL (ref 0–0.7)
EOSINOPHIL NFR BLD AUTO: 5 %
ERYTHROCYTE [DISTWIDTH] IN BLOOD BY AUTOMATED COUNT: 13.4 % (ref 10–15)
GFR SERPL CREATININE-BSD FRML MDRD: >90 ML/MIN/1.73M2
GLUCOSE BLD-MCNC: 80 MG/DL (ref 70–99)
HCT VFR BLD AUTO: 38.9 % (ref 35–47)
HGB BLD-MCNC: 12.2 G/DL (ref 11.7–15.7)
IMM GRANULOCYTES # BLD: 0 10E3/UL
IMM GRANULOCYTES NFR BLD: 0 %
LYMPHOCYTES # BLD AUTO: 3.7 10E3/UL (ref 0.8–5.3)
LYMPHOCYTES NFR BLD AUTO: 46 %
MCH RBC QN AUTO: 29.6 PG (ref 26.5–33)
MCHC RBC AUTO-ENTMCNC: 31.4 G/DL (ref 31.5–36.5)
MCV RBC AUTO: 94 FL (ref 78–100)
MONOCYTES # BLD AUTO: 0.5 10E3/UL (ref 0–1.3)
MONOCYTES NFR BLD AUTO: 6 %
NEUTROPHILS # BLD AUTO: 3.5 10E3/UL (ref 1.6–8.3)
NEUTROPHILS NFR BLD AUTO: 43 %
NRBC # BLD AUTO: 0 10E3/UL
NRBC BLD AUTO-RTO: 0 /100
PLAT MORPH BLD: NORMAL
PLATELET # BLD AUTO: 203 10E3/UL (ref 150–450)
POTASSIUM BLD-SCNC: 4.3 MMOL/L (ref 3.4–5.3)
PROT SERPL-MCNC: 7.7 G/DL (ref 6.8–8.8)
RBC # BLD AUTO: 4.12 10E6/UL (ref 3.8–5.2)
RBC MORPH BLD: NORMAL
SODIUM SERPL-SCNC: 143 MMOL/L (ref 133–144)
WBC # BLD AUTO: 8.1 10E3/UL (ref 4–11)

## 2021-11-23 PROCEDURE — 250N000011 HC RX IP 250 OP 636: Performed by: PHYSICIAN ASSISTANT

## 2021-11-23 PROCEDURE — G0463 HOSPITAL OUTPT CLINIC VISIT: HCPCS

## 2021-11-23 PROCEDURE — 258N000003 HC RX IP 258 OP 636: Performed by: PHYSICIAN ASSISTANT

## 2021-11-23 PROCEDURE — 86300 IMMUNOASSAY TUMOR CA 15-3: CPT | Performed by: PHYSICIAN ASSISTANT

## 2021-11-23 PROCEDURE — 99213 OFFICE O/P EST LOW 20 MIN: CPT | Performed by: PHYSICIAN ASSISTANT

## 2021-11-23 PROCEDURE — 85025 COMPLETE CBC W/AUTO DIFF WBC: CPT | Performed by: PHYSICIAN ASSISTANT

## 2021-11-23 PROCEDURE — 82378 CARCINOEMBRYONIC ANTIGEN: CPT | Performed by: PHYSICIAN ASSISTANT

## 2021-11-23 PROCEDURE — 96372 THER/PROPH/DIAG INJ SC/IM: CPT | Performed by: PHYSICIAN ASSISTANT

## 2021-11-23 PROCEDURE — 96413 CHEMO IV INFUSION 1 HR: CPT

## 2021-11-23 PROCEDURE — 82040 ASSAY OF SERUM ALBUMIN: CPT | Performed by: PHYSICIAN ASSISTANT

## 2021-11-23 PROCEDURE — 36591 DRAW BLOOD OFF VENOUS DEVICE: CPT | Performed by: PHYSICIAN ASSISTANT

## 2021-11-23 RX ORDER — EPINEPHRINE 1 MG/ML
0.3 INJECTION, SOLUTION INTRAMUSCULAR; SUBCUTANEOUS EVERY 5 MIN PRN
Status: CANCELLED | OUTPATIENT
Start: 2021-11-25

## 2021-11-23 RX ORDER — METHYLPREDNISOLONE SODIUM SUCCINATE 125 MG/2ML
125 INJECTION, POWDER, LYOPHILIZED, FOR SOLUTION INTRAMUSCULAR; INTRAVENOUS
Status: CANCELLED
Start: 2021-11-25

## 2021-11-23 RX ORDER — MEPERIDINE HYDROCHLORIDE 25 MG/ML
25 INJECTION INTRAMUSCULAR; INTRAVENOUS; SUBCUTANEOUS EVERY 30 MIN PRN
Status: CANCELLED | OUTPATIENT
Start: 2021-11-25

## 2021-11-23 RX ORDER — HEPARIN SODIUM (PORCINE) LOCK FLUSH IV SOLN 100 UNIT/ML 100 UNIT/ML
5 SOLUTION INTRAVENOUS EVERY 8 HOURS
Status: DISCONTINUED | OUTPATIENT
Start: 2021-11-23 | End: 2021-11-23 | Stop reason: HOSPADM

## 2021-11-23 RX ORDER — HEPARIN SODIUM (PORCINE) LOCK FLUSH IV SOLN 100 UNIT/ML 100 UNIT/ML
5 SOLUTION INTRAVENOUS ONCE
Status: COMPLETED | OUTPATIENT
Start: 2021-11-23 | End: 2021-11-23

## 2021-11-23 RX ORDER — HEPARIN SODIUM (PORCINE) LOCK FLUSH IV SOLN 100 UNIT/ML 100 UNIT/ML
5 SOLUTION INTRAVENOUS EVERY 8 HOURS
Status: CANCELLED | OUTPATIENT
Start: 2021-11-25

## 2021-11-23 RX ORDER — ALBUTEROL SULFATE 90 UG/1
1-2 AEROSOL, METERED RESPIRATORY (INHALATION)
Status: CANCELLED
Start: 2021-11-25

## 2021-11-23 RX ORDER — TRAZODONE HYDROCHLORIDE 50 MG/1
25 TABLET, FILM COATED ORAL AT BEDTIME
Qty: 30 TABLET | Refills: 0 | Status: SHIPPED | OUTPATIENT
Start: 2021-11-23 | End: 2022-09-12

## 2021-11-23 RX ORDER — DIPHENHYDRAMINE HYDROCHLORIDE 50 MG/ML
50 INJECTION INTRAMUSCULAR; INTRAVENOUS
Status: CANCELLED
Start: 2021-11-25

## 2021-11-23 RX ORDER — DIPHENHYDRAMINE HCL 25 MG
50 CAPSULE ORAL ONCE
Status: CANCELLED
Start: 2021-11-25

## 2021-11-23 RX ORDER — ALBUTEROL SULFATE 0.83 MG/ML
2.5 SOLUTION RESPIRATORY (INHALATION)
Status: CANCELLED | OUTPATIENT
Start: 2021-11-25

## 2021-11-23 RX ORDER — EPINEPHRINE 0.3 MG/.3ML
0.3 INJECTION SUBCUTANEOUS EVERY 5 MIN PRN
Status: CANCELLED | OUTPATIENT
Start: 2021-11-25

## 2021-11-23 RX ORDER — ACETAMINOPHEN 325 MG/1
650 TABLET ORAL
Status: CANCELLED | OUTPATIENT
Start: 2021-11-25

## 2021-11-23 RX ORDER — LORAZEPAM 2 MG/ML
0.5 INJECTION INTRAMUSCULAR EVERY 4 HOURS PRN
Status: CANCELLED
Start: 2021-11-25

## 2021-11-23 RX ORDER — SODIUM CHLORIDE 9 MG/ML
1000 INJECTION, SOLUTION INTRAVENOUS CONTINUOUS PRN
Status: CANCELLED
Start: 2021-11-25

## 2021-11-23 RX ADMIN — Medication 5 ML: at 12:50

## 2021-11-23 RX ADMIN — DENOSUMAB 120 MG: 120 INJECTION SUBCUTANEOUS at 15:47

## 2021-11-23 RX ADMIN — TRASTUZUMAB 450 MG: 150 INJECTION, POWDER, LYOPHILIZED, FOR SOLUTION INTRAVENOUS at 15:37

## 2021-11-23 RX ADMIN — SODIUM CHLORIDE 250 ML: 9 INJECTION, SOLUTION INTRAVENOUS at 15:37

## 2021-11-23 RX ADMIN — Medication 5 ML: at 16:13

## 2021-11-23 ASSESSMENT — PAIN SCALES - GENERAL: PAINLEVEL: NO PAIN (0)

## 2021-11-23 NOTE — PATIENT INSTRUCTIONS
[facility] Triage and after hours / weekends / holidays:  [phone]    Please call the triage or after hours line if you experience a temperature greater than or equal to 100.5, shaking chills, have uncontrolled nausea, vomiting and/or diarrhea, dizziness, shortness of breath, chest pain, bleeding, unexplained bruising, or if you have any other new/concerning symptoms, questions or concerns.      If you are having any concerning symptoms or wish to speak to a provider before your next infusion visit, please call your care coordinator or triage to notify them so we can adequately serve you.     If you need a refill on a narcotic prescription or other medication, please call before your infusion appointment.                 November 2021 Sunday Monday Tuesday Wednesday Thursday Friday Saturday        1     2    LAB CENTRAL  11:45 AM   (15 min.)   [facility]   [facility]    RETURN  12:00 PM   (45 min.)   Jamee Mckeon PA-C   [facility]    ONC INFUSION 1 HR (60 MIN)   1:30 PM   (60 min.)    ONC INFUSION NURSE   [facility] 3     4    Admission   6:16 AM   James Randolph MD   [facility]   (Discharge: 11/4/2021)    MR ABDOMEN MRCP WWO   6:30 AM   (60 min.)   [id number]   [facility] 5     6       7     8     9     10     11     12     13       14     15     16    COVID-19 PFIZER ADDT'L DOSE   9:30 AM   (5 min.)   [facility] COVID VACCINE PFIZER   [facility] [place] 17     18     19     20       21     22    RETURN   2:45 PM   (15 min.)   Aries Alfaro DPM   [facility] 23    LAB CENTRAL  12:45 PM   (15 min.)   [facility]   [facility]    RETURN   1:00 PM   (45 min.)   Jamee Mckeon PA-C   [facility]    ONC INFUSION  1 HR (60 MIN)   2:30 PM   (60 min.)   [facility] ONC INFUSION NURSE   [facility] 24     25     26     27       28     29     30    ECHO COMPLETE  11:00 AM   (60 min.)   [id number]   [facility]                                 December 2021 Sunday Monday Tuesday Wednesday Thursday Friday Saturday                  1     2     3     4       5     6     7     8     9    MYCHART PHYSICAL ADULT   1:30 PM   (30 min.)   Laury Dee MD   [facility] 10     11       12     13    CT CHEST W  12:05 PM   (20 min.)   [email or web address]   [facility] [place] 14     15     16    VIDEO VISIT RETURN   7:55 AM   (30 min.)   Lisandro Aguiar MD   [facility] 17     18       19     20     21     22     23     24     25       26     27     28     29     30     31                         Recent Results (from the past 24 hour(s))   Comprehensive metabolic panel    Collection Time: 11/23/21 12:56 PM   Result Value Ref Range    Sodium 143 133 - 144 mmol/L    Potassium 4.3 3.4 - 5.3 mmol/L    Chloride 109 94 - 109 mmol/L    Carbon Dioxide (CO2) 28 20 - 32 mmol/L    Anion Gap 6 3 - 14 mmol/L    Urea Nitrogen 12 7 - 30 mg/dL    Creatinine 0.69 0.52 - 1.04 mg/dL    Calcium 8.7 8.5 - 10.1 mg/dL    Glucose 80 70 - 99 mg/dL    Alkaline Phosphatase 51 40 - 150 U/L    AST 58 (H) 0 - 45 U/L    ALT 82 (H) 0 - 50 U/L    Protein Total 7.7 6.8 - 8.8 g/dL    Albumin 3.1 (L) 3.4 - 5.0 g/dL    Bilirubin Total 0.3 0.2 - 1.3 mg/dL    GFR Estimate >90 >60 mL/min/1.73m2   CBC with platelets and differential    Collection Time: 11/23/21 12:56 PM   Result Value Ref Range    WBC Count 8.1 4.0 - 11.0 10e3/uL    RBC Count 4.12 3.80 - 5.20 10e6/uL    Hemoglobin 12.2 11.7 - 15.7 g/dL    Hematocrit 38.9 35.0 - 47.0 %    MCV 94 78 - 100 fL    MCH 29.6 26.5 - 33.0 pg    MCHC 31.4 (L) 31.5 - 36.5 g/dL    RDW 13.4 10.0 - 15.0 %    Platelet  Count 203 150 - 450 10e3/uL    % Neutrophils 43 %    % Lymphocytes 46 %    % Monocytes 6 %    % Eosinophils 5 %    % Basophils 0 %    % Immature Granulocytes 0 %    NRBCs per 100 WBC 0 <1 /100    Absolute Neutrophils 3.5 1.6 - 8.3 10e3/uL    Absolute Lymphocytes 3.7 0.8 - 5.3 10e3/uL    Absolute Monocytes 0.5 0.0 - 1.3 10e3/uL    Absolute Eosinophils 0.4 0.0 - 0.7 10e3/uL    Absolute Basophils 0.0 0.0 - 0.2 10e3/uL    Absolute Immature Granulocytes 0.0 <=0.0 10e3/uL    Absolute NRBCs 0.0 10e3/uL   RBC and Platelet Morphology    Collection Time: 11/23/21 12:56 PM   Result Value Ref Range    Platelet Assessment  Automated Count Confirmed. Platelet morphology is normal.     Automated Count Confirmed. Platelet morphology is normal.    RBC Morphology Confirmed RBC Indices

## 2021-11-23 NOTE — NURSING NOTE
Chief Complaint   Patient presents with     Port Draw     Labs drawn via port by RN in lab. VS taken.      Port accessed with 20 gauge 3/4 inch flat needle and labs drawn by RN.  Port flushed with saline and heparin.  Pt tolerated well.  VS taken.  Pt checked in for next appt.    Ashlyn Wilson RN

## 2021-11-23 NOTE — PROGRESS NOTES
Infusion Nursing Note:  Hoda Brush presents today for Cycle 22 day 1 Herceptin    Patient seen by provider today: Yes: Jossie BENITEZ   present during visit today: Yes, Language: Ivorian.     Note: Xgeva due on 12/24    Intravenous Access:  Implanted Port.    Treatment Conditions:  Lab Results   Component Value Date    HGB 11.3 12/05/2018     Lab Results   Component Value Date    WBC 6.3 12/05/2018      Lab Results   Component Value Date    ANEU 3.4 12/05/2018     Lab Results   Component Value Date     12/05/2018      Lab Results   Component Value Date     12/05/2018                   Lab Results   Component Value Date    POTASSIUM 4.0 12/05/2018           No results found for: MAG         Lab Results   Component Value Date    CR 0.85 12/05/2018                   Lab Results   Component Value Date    TAYLER 8.5 12/05/2018                Lab Results   Component Value Date    BILITOTAL 0.1 12/05/2018           Lab Results   Component Value Date    ALBUMIN 3.1 12/05/2018                    Lab Results   Component Value Date    ALT 29 12/05/2018           Lab Results   Component Value Date    AST 26 12/05/2018       Results reviewed, labs MET treatment parameters, ok to proceed with treatment.    ECHO done today; WNL    Post Infusion Assessment:  Patient tolerated infusion without incident.  Blood return noted pre and post infusion.  No evidence of extravasations.  Access discontinued per protocol.    Discharge Plan:   Patient declined prescription refills.  Copy of AVS reviewed with patient and/or family.  Patient will return 12/24 for next appointment.  Patient discharged in stable condition accompanied by: self.  Departure Mode: Ambulatory.    Jacque Martino RN                        
Yes

## 2021-11-23 NOTE — NURSING NOTE
"Oncology Rooming Note    November 23, 2021 2:15 PM   Hoda Brush is a 66 year old female who presents for:    Chief Complaint   Patient presents with     Port Draw     Labs drawn via port by RN in lab. VS taken.      Oncology Clinic Visit     [facility] RETURN - BREAST CANCER     Initial Vitals: /71 (BP Location: Right arm, Patient Position: Sitting, Cuff Size: Adult Regular)   Pulse 64   Temp 98.3  F (36.8  C) (Oral)   Resp 16   Wt 80.1 kg (176 lb 10.1 oz)   SpO2 96%   BMI 31.30 kg/m   Estimated body mass index is 31.3 kg/m  as calculated from the following:    Height as of 11/22/21: 1.6 m (5' 2.99\").    Weight as of this encounter: 80.1 kg (176 lb 10.1 oz). Body surface area is 1.89 meters squared.  No Pain (0) Comment: Data Unavailable   No LMP recorded. Patient is postmenopausal.  Allergies reviewed: Yes  Medications reviewed: Yes    Medications: Medication refills not needed today.  Pharmacy name entered into [employer]: [id number] [place], [place] - [address] AT [address] [place] [address]    Clinical concerns: No new concerns. Mike was notified.      Vik Tenorio LPN            "

## 2021-11-23 NOTE — PROGRESS NOTES
Infusion Nursing Note:  Hoda Brush presents today for Cycle 72 Day Trastuzumab and Zgeva.    Patient seen by provider today: Yes: Jamee Mckeon PA-C   present during visit today: No; pt signed  waiver 8/31/2019    Note: Patient presents to the infusion center after her provider appt denying any complaints or concerns.    Intravenous Access:  Implanted Port.    Treatment Conditions:  Lab Results   Component Value Date    HGB 12.2 11/23/2021    WBC 8.1 11/23/2021    ANEU 3.1 06/22/2021    ANEUTAUTO 3.5 11/23/2021     11/23/2021      Lab Results   Component Value Date     11/23/2021    POTASSIUM 4.3 11/23/2021    CR 0.69 11/23/2021    TAYLER 8.7 11/23/2021    BILITOTAL 0.3 11/23/2021    ALBUMIN 3.1 (L) 11/23/2021    ALT 82 (H) 11/23/2021    AST 58 (H) 11/23/2021     Corrected Calcium 9.42  Results reviewed, labs MET treatment parameters, ok to proceed with treatment.  ECHO/MUGA completed 8/30/21 EF 55-60%.    Post Infusion Assessment:  Patient tolerated infusion without incident.  Patient tolerated ONE injection in RIGHT ARM without incident.  Blood return noted pre and post infusion.  Site patent and intact, free from redness, edema or discomfort.  No evidence of extravasations.  Access discontinued per protocol.     Discharge Plan:   Patient declined prescription refills.  Discharge instructions reviewed with: Patient.  Patient and/or family verbalized understanding of discharge instructions and all questions answered.  AVS to patient via [employer].  Patient will return in 3 weeks for next infusion, appointment yet to be scheduled, patient will watch [employer].   Patient discharged in stable condition accompanied by: self.  Departure Mode: Ambulatory.      Roselyn De La Cruz RN

## 2021-11-24 LAB
CANCER AG27-29 SERPL-ACNC: 8 U/ML (ref 0–39)
CEA SERPL-MCNC: 0.6 UG/L (ref 0–2.5)

## 2021-11-30 ENCOUNTER — ANCILLARY PROCEDURE (OUTPATIENT)
Dept: CARDIOLOGY | Facility: CLINIC | Age: 66
End: 2021-11-30
Attending: PHYSICIAN ASSISTANT
Payer: COMMERCIAL

## 2021-11-30 DIAGNOSIS — Z91.89 AT RISK FOR CARDIOMYOPATHY: ICD-10-CM

## 2021-11-30 LAB — LVEF ECHO: NORMAL

## 2021-11-30 PROCEDURE — 93306 TTE W/DOPPLER COMPLETE: CPT | Performed by: INTERNAL MEDICINE

## 2021-12-02 DIAGNOSIS — C50.912 MALIGNANT NEOPLASM OF LEFT FEMALE BREAST, UNSPECIFIED ESTROGEN RECEPTOR STATUS, UNSPECIFIED SITE OF BREAST (H): ICD-10-CM

## 2021-12-02 DIAGNOSIS — C50.919 METASTATIC BREAST CANCER: Primary | ICD-10-CM

## 2021-12-09 ENCOUNTER — OFFICE VISIT (OUTPATIENT)
Dept: FAMILY MEDICINE | Facility: CLINIC | Age: 66
End: 2021-12-09
Payer: COMMERCIAL

## 2021-12-09 VITALS
OXYGEN SATURATION: 99 % | RESPIRATION RATE: 16 BRPM | SYSTOLIC BLOOD PRESSURE: 138 MMHG | DIASTOLIC BLOOD PRESSURE: 86 MMHG | HEART RATE: 87 BPM | TEMPERATURE: 97.1 F | WEIGHT: 179 LBS | BODY MASS INDEX: 31.71 KG/M2 | HEIGHT: 63 IN

## 2021-12-09 DIAGNOSIS — Z00.00 ENCOUNTER FOR MEDICARE ANNUAL WELLNESS EXAM: Primary | ICD-10-CM

## 2021-12-09 DIAGNOSIS — Z13.220 SCREENING FOR LIPID DISORDERS: ICD-10-CM

## 2021-12-09 DIAGNOSIS — Z90.12 S/P MASTECTOMY, LEFT: ICD-10-CM

## 2021-12-09 DIAGNOSIS — Z13.29 SCREENING FOR THYROID DISORDER: ICD-10-CM

## 2021-12-09 DIAGNOSIS — Z80.3 FAMILY HISTORY OF MALIGNANT NEOPLASM OF BREAST: ICD-10-CM

## 2021-12-09 DIAGNOSIS — C79.51 BONE METASTASIS: ICD-10-CM

## 2021-12-09 DIAGNOSIS — Z13.0 SCREENING FOR DEFICIENCY ANEMIA: ICD-10-CM

## 2021-12-09 DIAGNOSIS — C50.919 METASTATIC BREAST CANCER: ICD-10-CM

## 2021-12-09 DIAGNOSIS — K76.0 FATTY LIVER: ICD-10-CM

## 2021-12-09 DIAGNOSIS — Z11.4 SCREENING FOR HIV (HUMAN IMMUNODEFICIENCY VIRUS): ICD-10-CM

## 2021-12-09 PROCEDURE — 84443 ASSAY THYROID STIM HORMONE: CPT | Performed by: INTERNAL MEDICINE

## 2021-12-09 PROCEDURE — 87389 HIV-1 AG W/HIV-1&-2 AB AG IA: CPT | Performed by: INTERNAL MEDICINE

## 2021-12-09 PROCEDURE — 82728 ASSAY OF FERRITIN: CPT | Performed by: INTERNAL MEDICINE

## 2021-12-09 PROCEDURE — 36415 COLL VENOUS BLD VENIPUNCTURE: CPT | Performed by: INTERNAL MEDICINE

## 2021-12-09 PROCEDURE — 80061 LIPID PANEL: CPT | Performed by: INTERNAL MEDICINE

## 2021-12-09 PROCEDURE — G0402 INITIAL PREVENTIVE EXAM: HCPCS | Performed by: INTERNAL MEDICINE

## 2021-12-09 ASSESSMENT — ENCOUNTER SYMPTOMS
DIZZINESS: 0
HEMATOCHEZIA: 0
SORE THROAT: 0
DYSURIA: 0
ABDOMINAL PAIN: 0
DIARRHEA: 0
FEVER: 0
MYALGIAS: 0
NERVOUS/ANXIOUS: 0
SHORTNESS OF BREATH: 0
PARESTHESIAS: 0
NAUSEA: 0
JOINT SWELLING: 0
HEMATURIA: 0
ARTHRALGIAS: 0
FREQUENCY: 0
EYE PAIN: 0
PALPITATIONS: 0
HEADACHES: 0
CONSTIPATION: 0
COUGH: 0
CHILLS: 0
HEARTBURN: 0

## 2021-12-09 ASSESSMENT — MIFFLIN-ST. JEOR: SCORE: 1325.91

## 2021-12-09 ASSESSMENT — PAIN SCALES - GENERAL: PAINLEVEL: NO PAIN (0)

## 2021-12-09 ASSESSMENT — ACTIVITIES OF DAILY LIVING (ADL): CURRENT_FUNCTION: NO ASSISTANCE NEEDED

## 2021-12-09 NOTE — PROGRESS NOTES
"SUBJECTIVE:   Hoda Brush is a 66 year old female who presents for Preventive Visit.    Patient is very sparingly but we still use the  on the phone  She is from [facility]       Patient has been advised of split billing requirements and indicates understanding: Yes   Are you in the first 12 months of your Medicare coverage?  Yes,  Visual Acuity:  Right Eye: 20/50   Left Eye: 20/63  Both Eyes: 20/32    Healthy Habits:     In general, how would you rate your overall health?  Good    Frequency of exercise:  2-3 days/week    Duration of exercise:  15-30 minutes    Do you usually eat at least 4 servings of fruit and vegetables a day, include whole grains    & fiber and avoid regularly eating high fat or \"junk\" foods?  Yes    Taking medications regularly:  Yes    Medication side effects:  None    Ability to successfully perform activities of daily living:  No assistance needed    Home Safety:  No safety concerns identified    Hearing Impairment:  No hearing concerns    In the past 6 months, have you been bothered by leaking of urine?  No    In general, how would you rate your overall mental or emotional health?  Good      PHQ-2 Total Score: 0    Additional concerns today:  No    Do you feel safe in your environment? Yes    Have you ever done Advance Care Planning? (For example, a Health Directive, POLST, or a discussion with a medical provider or your loved ones about your wishes): No, advance care planning information given to patient to review.  Advanced care planning was discussed at today's visit.       Fall risk  Fallen 2 or more times in the past year?: No  Any fall with injury in the past year?: No    Cognitive Screening   1) Repeat 3 items (Leader, Season, Table)    2) Clock draw: not sure patient understood  3) 3 item recall: Recalls 2 objects   Results: NORMAL clock, 1-2 items recalled: COGNITIVE IMPAIRMENT LESS LIKELY    Mini-CogTM Copyright S Daniel. Licensed by the author for use in " [facility]; reprinted with permission (amos@[facility]). All rights reserved.      Do you have sleep apnea, excessive snoring or daytime drowsiness?: no    Reviewed and updated as needed this visit by clinical staff  Tobacco  Allergies  Meds  Problems  Med Hx  Surg Hx  Fam Hx         Reviewed and updated as needed this visit by Provider   Allergies  Meds  Problems           Social History     Tobacco Use     Smoking status: Never Smoker     Smokeless tobacco: Never Used   Substance Use Topics     Alcohol use: No         Alcohol Use 12/9/2021   Prescreen: >3 drinks/day or >7 drinks/week? No   Prescreen: >3 drinks/day or >7 drinks/week? -             Current providers sharing in care for this patient include:   Patient Care Team:  Laury Dee MD as PCP - General (Internal Medicine)  Lisandro Aguiar MD as MD (Oncology)  Lisandro Aguiar MD as Assigned Cancer Care Provider  Melissa Melara APRN CNP as Assigned PCP  Lory Arriaga RN as Lead Care Coordinator (Primary Care - CC)  Aries Alfaro DPM as Assigned Musculoskeletal Provider    The following health maintenance items are reviewed in Epic and correct as of today:  Health Maintenance Due   Topic Date Due     DEXA  Never done     HIV SCREENING  Never done     ZOSTER IMMUNIZATION (1 of 2) Never done       Any new diagnosis of family breast, ovarian, or bowel cancer? No      Review of Systems   Constitutional: Negative for chills and fever.   HENT: Negative for congestion, ear pain, hearing loss and sore throat.    Eyes: Negative for pain.   Respiratory: Negative for cough and shortness of breath.    Cardiovascular: Negative for chest pain, palpitations and peripheral edema.   Gastrointestinal: Negative for abdominal pain, constipation, diarrhea, heartburn, hematochezia and nausea.   Genitourinary: Negative for dysuria, frequency, genital sores, hematuria, pelvic pain, urgency, vaginal bleeding and vaginal  "discharge.   Musculoskeletal: Negative for arthralgias, joint swelling and myalgias.   Skin: Negative for rash.   Neurological: Negative for dizziness, headaches and paresthesias.   Psychiatric/Behavioral: Negative for mood changes. The patient is not nervous/anxious.          OBJECTIVE:   /86 (BP Location: Right arm, Cuff Size: Adult Regular)   Pulse 87   Temp 97.1  F (36.2  C) (Temporal)   Resp 16   Ht 1.6 m (5' 2.99\")   Wt 81.2 kg (179 lb)   SpO2 99%   BMI 31.72 kg/m   Estimated body mass index is 31.72 kg/m  as calculated from the following:    Height as of this encounter: 1.6 m (5' 2.99\").    Weight as of this encounter: 81.2 kg (179 lb).  Physical Exam  GENERAL: healthy, alert and no distress  EYES: Eyes grossly normal to inspection, PERRL and conjunctivae and sclerae normal  HENT: ear canals and TM's normal, nose and mouth without ulcers or lesions  NECK: no adenopathy, no asymmetry, masses, or scars and thyroid normal to palpation  RESP: lungs clear to auscultation - no rales, rhonchi or wheezes  BREAST: She has a scar of mastectomy on the left  She has right breast no abnormal masses felt  CV: regular rate and rhythm, normal S1 S2, no S3 or S4, no murmur, click or rub, no peripheral edema and peripheral pulses strong  ABDOMEN: soft, nontender, no hepatosplenomegaly, no masses and bowel sounds normal  MS: no gross musculoskeletal defects noted, no edema  SKIN: no suspicious lesions or rashes  She has hypopigmented spots on her chest  She has port on the right  NEURO: Normal strength and tone, mentation intact and speech normal  PSYCH: mentation appears normal, affect normal/bright        ASSESSMENT / PLAN:   Hoda was seen today for physical.    Diagnoses and all orders for this visit:    She does not speak fair English but we used  on the phone    Encounter for Medicare annual wellness exam  She is from [facility]  She speaks Greek  She has metastatic breast cancer  So getting " mammogram is not that important at this point  Per patient her oncologist told her not to  She is here to get established as this clinic is closer for her    Screening for HIV (human immunodeficiency virus)  -     HIV Antigen Antibody Combo; Future  -     HIV Antigen Antibody Combo    Screening for thyroid disorder  -     TSH with free T4 reflex; Future  -     TSH with free T4 reflex    Screening for lipid disorders  -     Lipid panel reflex to direct LDL Non-fasting; Future  -     Lipid panel reflex to direct LDL Non-fasting    Screening for deficiency anemia  -     Ferritin; Future  -     Ferritin    Metastatic breast cancer (H)   Per oncology note   Hoda Brush is a 66-year-old woman with a history of ER-positive, NV-positive, HER2 positive breast cancer.    She is from [facility], formally from [facility], and came to live in the [place] with her daughter.    The tumor is ER positive, NV positive and HER2 positive.    She had metastatic disease at the time of presentation with bone-only metastases by report.   She underwent neoadjuvant CAF, had a left mastectomy, left axillary lymph node dissection, radiation to the right hip, which included the right iliac region where she had metastatic disease.  She was initially on tamoxifen but then was switched to letrozole.    She continues on this and every 3-week trastuzumab.    She has had no evidence of disease progression for the last 3 years.    continued Herceptin every 3 weeks as well as daily letrozole.   -- Tumor markers have been stable  - CT CAP 10/18 showed stable disease   -- Echo due end of November, scheduled     Bone metastasis (H)  She is on Xgeva    S/P mastectomy, left  Due to breast cancer    Fatty liver  Seen on scan     Other orders  -     REVIEW OF HEALTH MAINTENANCE PROTOCOL ORDERS        Patient has been advised of split billing requirements and indicates understanding: Yes  COUNSELING:  Reviewed preventive health counseling, as reflected  "in patient instructions       Regular exercise       Healthy diet/nutrition       Osteoporosis prevention/bone health    Estimated body mass index is 31.72 kg/m  as calculated from the following:    Height as of this encounter: 1.6 m (5' 2.99\").    Weight as of this encounter: 81.2 kg (179 lb).    Weight management plan: Discussed healthy diet and exercise guidelines    She reports that she has never smoked. She has never used smokeless tobacco.      Appropriate preventive services were discussed with this patient, including applicable screening as appropriate for cardiovascular disease, diabetes, osteopenia/osteoporosis, and glaucoma.  As appropriate for age/gender, discussed screening for colorectal cancer, prostate cancer, breast cancer, and cervical cancer. Checklist reviewing preventive services available has been given to the patient.    Reviewed patients plan of care and provided an AVS. The Basic Care Plan (routine screening as documented in Health Maintenance) for Hoda meets the Care Plan requirement. This Care Plan has been established and reviewed with the Patient.    Counseling Resources:  ATP IV Guidelines  Pooled Cohorts Equation Calculator  Breast Cancer Risk Calculator  Breast Cancer: Medication to Reduce Risk  FRAX Risk Assessment  ICSI Preventive Guidelines  Dietary Guidelines for Americans, 2010  USDA's MyPlate  ASA Prophylaxis  Lung CA Screening    Laury Dee MD  [facility]    Identified Health Risks:  "

## 2021-12-09 NOTE — PATIENT INSTRUCTIONS
There is this is a new shingles vaccine available called shingrex  It is a series of 2 shots 2-6 months apart.  Considered more than 90% effective.  Please go to any pharmacy to get the  Vaccine  Follow up in 3 months  Seek sooner medical attention if there is any worsening of symptoms or problems.          Patient Education   Personalized Prevention Plan  You are due for the preventive services outlined below.  Your care team is available to assist you in scheduling these services.  If you have already completed any of these items, please share that information with your care team to update in your medical record.  Health Maintenance Due   Topic Date Due     Osteoporosis Screening  Never done     ANNUAL REVIEW OF HM ORDERS  Never done     HIV Screening  Never done     Zoster (Shingles) Vaccine (1 of 2) Never done     Preventive Health Recommendations    See your health care provider every year to    Review health changes.     Discuss preventive care.      Review your medicines if your doctor has prescribed any.    You no longer need a yearly Pap test unless you've had an abnormal Pap test in the past 10 years. If you have vaginal symptoms, such as bleeding or discharge, be sure to talk with your provider about a Pap test.    Every 1 to 2 years, have a mammogram.  If you are over 69, talk with your health care provider about whether or not you want to continue having screening mammograms.    Every 10 years, have a colonoscopy. Or, have a yearly FIT test (stool test). These exams will check for colon cancer.     Have a cholesterol test every 5 years, or more often if your doctor advises it.     Have a diabetes test (fasting glucose) every three years. If you are at risk for diabetes, you should have this test more often.     At age 65, have a bone density scan (DEXA) to check for osteoporosis (brittle bone disease).    Shots:    Get a flu shot each year.    Get a tetanus shot every 10 years.    Talk to your doctor  about your pneumonia vaccines. There are now two you should receive - Pneumovax (PPSV 23) and Prevnar (PCV 13).    Talk to your pharmacist about the shingles vaccine.    Talk to your doctor about the hepatitis B vaccine.    Nutrition:     Eat at least 5 servings of fruits and vegetables each day.    Eat whole-grain bread, whole-wheat pasta and brown rice instead of white grains and rice.    Get adequate Calcium and Vitamin D.     Lifestyle    Exercise at least 150 minutes a week (30 minutes a day, 5 days a week). This will help you control your weight and prevent disease.    Limit alcohol to one drink per day.    No smoking.     Wear sunscreen to prevent skin cancer.     See your dentist twice a year for an exam and cleaning.    See your eye doctor every 1 to 2 years to screen for conditions such as glaucoma, macular degeneration and cataracts.    Personalized Prevention Plan  You are due for the preventive services outlined below.  Your care team is available to assist you in scheduling these services.  If you have already completed any of these items, please share that information with your care team to update in your medical record.  Health Maintenance   Topic Date Due     DEXGABRIELLA  Never done     ANNUAL REVIEW OF HM ORDERS  Never done     HIV SCREENING  Never done     ZOSTER IMMUNIZATION (1 of 2) Never done     FALL RISK ASSESSMENT  03/30/2022     LIPID  08/08/2022     MEDICARE ANNUAL WELLNESS VISIT  12/09/2022     Pneumococcal Vaccine: Pediatrics (0 to 5 Years) and At-Risk Patients (6 to 64 Years) (2 of 4 - PPSV23) 07/21/2025     Pneumococcal Vaccine: 65+ Years (2 of 4 - PPSV23) 07/21/2025     ADVANCE CARE PLANNING  12/09/2026     COLORECTAL CANCER SCREENING  02/01/2028     DTAP/TDAP/TD IMMUNIZATION (3 - Td or Tdap) 07/21/2030     HEPATITIS C SCREENING  Completed     PHQ-2  Completed     INFLUENZA VACCINE  Completed     COVID-19 Vaccine  Completed     IPV IMMUNIZATION  Aged Out     MENINGITIS IMMUNIZATION  Aged Out      HEPATITIS B IMMUNIZATION  Aged Out

## 2021-12-10 LAB — HIV 1+2 AB+HIV1 P24 AG SERPL QL IA: NONREACTIVE

## 2021-12-11 LAB
FERRITIN SERPL-MCNC: 89 NG/ML (ref 8–252)
TSH SERPL DL<=0.005 MIU/L-ACNC: 1.01 MU/L (ref 0.4–4)

## 2021-12-11 NOTE — RESULT ENCOUNTER NOTE
Phil Drummond,    This is to inform you regarding your test result.    HIV 1&2 Antibody is negative.      Sincerely,      Dr.Nasima Luiz MD,FACP

## 2021-12-12 NOTE — RESULT ENCOUNTER NOTE
Phil Drummond,    This is to inform you regarding your test result.    TSH which is thyroid hormone is normal.  Ferritin which is iron stores in the body is normal.  HIV 1&2 Antibody is negative.      Sincerely,      Dr.Nasima Luiz MD,FACP

## 2021-12-13 ENCOUNTER — TELEPHONE (OUTPATIENT)
Dept: ONCOLOGY | Facility: CLINIC | Age: 66
End: 2021-12-13

## 2021-12-13 ENCOUNTER — ANCILLARY PROCEDURE (OUTPATIENT)
Dept: CT IMAGING | Facility: CLINIC | Age: 66
End: 2021-12-13
Attending: INTERNAL MEDICINE
Payer: COMMERCIAL

## 2021-12-13 DIAGNOSIS — C50.919 METASTATIC BREAST CANCER: ICD-10-CM

## 2021-12-13 DIAGNOSIS — C50.912 MALIGNANT NEOPLASM OF LEFT FEMALE BREAST, UNSPECIFIED ESTROGEN RECEPTOR STATUS, UNSPECIFIED SITE OF BREAST (H): ICD-10-CM

## 2021-12-13 PROCEDURE — 74177 CT ABD & PELVIS W/CONTRAST: CPT | Performed by: RADIOLOGY

## 2021-12-13 PROCEDURE — 71260 CT THORAX DX C+: CPT | Performed by: RADIOLOGY

## 2021-12-13 RX ORDER — HEPARIN SODIUM (PORCINE) LOCK FLUSH IV SOLN 100 UNIT/ML 100 UNIT/ML
5 SOLUTION INTRAVENOUS ONCE
Status: COMPLETED | OUTPATIENT
Start: 2021-12-13 | End: 2021-12-13

## 2021-12-13 RX ORDER — IOPAMIDOL 755 MG/ML
109 INJECTION, SOLUTION INTRAVASCULAR ONCE
Status: COMPLETED | OUTPATIENT
Start: 2021-12-13 | End: 2021-12-13

## 2021-12-13 RX ADMIN — HEPARIN SODIUM (PORCINE) LOCK FLUSH IV SOLN 100 UNIT/ML 5 ML: 100 SOLUTION at 12:25

## 2021-12-13 RX ADMIN — IOPAMIDOL 109 ML: 755 INJECTION, SOLUTION INTRAVASCULAR at 12:10

## 2021-12-13 NOTE — PROGRESS NOTES
How would you like to obtain your AVS? [place]  If the video visit is dropped, the invitation should be resent by: Text to cell phone: [phone]  Will anyone else be joining your video visit? No      Hoda is a patient from [facility] and is seen here for continuation of care for her metastatic ER+HER2- breast cancer.  She is a Catholic refugee from [facility] and was initially seen in clinic with her daughter.  The only data we have from [facility] is an English translation of her records.       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  On 02/11/2014, she was seen by an oncologist.  The clinical staging of T4b N2 M1 was noted.   Her breast cancer was on the left side. There was no right breast cancer, confirmed by the patient and her daughter, correcting a possible error in the translated records.  ER was positive in 100% of the cells, NJ at 14% and HER2 was 3+ positive.  It appears that this histopathologic information is on the mastectomy specimen. She underwent an MRI for staging of presumptive bone metastases which was performed 03/02/2014.  There were skeletal metastases in the thoracic vertebrae at 12, L1, L3, L5 and S1 vertebral body, ranging in size from 0.7-3.0 cm in size.  Ischial and right femur were also involved, as well as a large iliac mass measuring about 15 cm in the uterus. 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   Radiation Oncology consultation was performed 03/11/2014, and she was given radiation in 1 dose of 6 Gy to the right iliac lesion.        With the initial diagnosis, she initiated treatment with 6 cycles of CAF neoadjuvant chemotherapy 02/14, 07/07, 03/28, 04/18/14, 05/08 and 05/29/14. She also received monthly zoledronic acid.  She then underwent a modified left mastectomy of Palacios type and left lymphadenoectomy on 06/27/2014.   Pathologic examination showed number at [facility] was [id number] showed  infiltrative grade 2 ductal cancer with 6 level 1 metastatic lympFollow up with Jossie for visits and trastuzumab on 2-5, 2-26, 3-19 with CBC, CMP.  Echocardiogram on 3-19.  Follow up with me 4-9 with CBC, CMP, CA27.29 and CEA and with CT CAP on 4-8.h nodes and 3 level 2 metastatic lymph nodes.  The differentiation was intermediate.  The tumor was ER positive 70% of the cells, VA positive in 40% of the cells and HER2 was 3+ by immunohistochemistry and the Ki-67 labeling index was 20%. Her staging after surgery was stage IV, pT4b N2 M1.  I don't see a biopsy of the skeletal metastases.  She then had continuation with chemotherapy with 4 cycles of Herceptin and taxane with monthly zoledronic acid.  Tamoxifen was initiated.  She then had two years of Herceptin and a decision was made not to continue further Herceptin.  She continued on zoledronic acid every 3 months. She was clinically stable. [place] the [place] new [place] from [facility].  She has now been changed to letrozole.  Denosumab has now been held for new diagnosis of osteonecrosis of the R maxilla.     History of cholecystectomy 2020.      TREATMENT HISTORY:  A. Initial diagnosis with metastatic breast cancer in [facility].  Neoadjuvant CAF x 6.   B. Left mastectomy. Left axillary node dissection.  C.  Radiation in 1 dose to R iliac region.  C. Herceptin for 2 years taxane for a prescribed course then stopped, monthly zoledronic acid.  She had 2 years of Herceptin with tamoxifen added after chemotherapy.  D.  Tamoxifen alone and zoledronic acid every 3 months starting 2014.  Letrozole was started   E.  [place]  We restarted Herceptin every 3 weeks and continued tamoxifen. Bone targeted agent changed to denosumab every 9 weeks. Zometa discontinued 2017.  Tamoxifen was changed to letrozole August 2019.    F.  Xgeva discontinued 12-17-19 because of dental issues.   G.  J+J vaccine March, 2021.  H.  Was on Zometa once May 11.  Reaction with eye  lid swelling. Discontinued Zometal  H.  Restart Xgeva 6-22-21.  Continuing the trastuzumab and letrozole.  Both tolerated well.       INTERVAL HISTORY  Hoda has been doing well.  Her right upper quadrant discomfort has completely resolved.  She has no jaundice.  Her MRCP was normal.  She has no pain except for chronic low back pain.  She denies fatigue, depression, anxiety.     REVIEW OF SYSTEMS:   Hoda has been feeling generally well.  Mild bone and back discomfort but not new, no fatigue depression or anxiety today she denies fevers or chills, cough, chest pain, shortness of breath. She did have mild nausea, vomiting, now resolved.  No constipation, diarrhea, bone pain, back pain, or headache.  She denies gastroesophageal reflux symptoms and we've discontinued famotidine.  The remainder of a 10 point ROS was negative.      PHYSICAL EXAM: She appeared generally well.  No jaundice.  Mood and affect were normal.  No alopecia     This was a phone visit with a Spanish .    Mood and affect normal. No jaundice.      Labs   pending     ECG: First-degree AV block otherwise normal ECG.     Imaging   12-13-21  CT CHEST/ABDOMEN/PELVIS WITH CONTRAST 12/13/2021 12:31 PM     CLINICAL HISTORY: Metastatic breast cancer (H). Malignant neoplasm of  left female breast, unspecified estrogen receptor status, unspecified  site of breast (H).     TECHNIQUE: CT scan of the chest, abdomen, and pelvis was performed  following injection of IV contrast. Multiplanar reformats were  obtained. Dose reduction techniques were used.   CONTRAST: Isovue 370 109 cc     COMPARISON: 10/18/2021.     FINDINGS:   LUNGS AND PLEURA: 0.5 cm right middle lobe nodule (series 9, image  195) is unchanged. 0.5 cm left lower lobe nodule (series 9, image 202)  is unchanged. Other smaller pulmonary nodules are stable. No  effusions.     MEDIASTINUM/AXILLAE: Right-sided internal jugular vein Port-A-Cath  terminates at the atriocaval junction. A  prominent left axillary lymph  node measures 0.5 cm in short axis (series 8, 117), smaller than on  the prior study when it measured 0.7 cm. No enlarged mediastinal or  hilar lymph nodes. A left mastectomy has been performed.     CORONARY ARTERY CALCIFICATION: None.     HEPATOBILIARY: Mild diffuse decreased attenuation of the liver  compatible with fatty infiltration. Low-density along the gallbladder  fossa may represent focal fat and is unchanged. The gallbladder is  absent.     PANCREAS: Normal.     SPLEEN: Normal.     ADRENAL GLANDS: Normal.     KIDNEYS/BLADDER: Normal.     BOWEL: Normal.     PELVIC ORGANS: Multiple uterine fibroids are noted.     ADDITIONAL FINDINGS: No ascites or adenopathy.     MUSCULOSKELETAL: Diffuse bony metastases appear unchanged.                                                                   IMPRESSION:  1.  Stable pulmonary nodules.  2.  Prominent left axillary lymph node is smaller than on the prior  study.  3.  Skeletal metastases appear stable.      CHAY GAUTHIER MD         MR ABDOMEN MAGNETIC RESONANCE CHOLANGIOPANCREATOGRAPHY WITHOUT AND  WITH CONTRAST 11/4/2021 8:04 AM      HISTORY: Abdominal pain, biliary obstruction suspected (Ped 0-18y).  Malignant neoplasm of left female breast, unspecified estrogen  receptor status, unspecified site of breast (H).     COMPARISON: CT 10/18/2021     TECHNIQUE: Routine MR liver/pancreas protocol including axial and  coronal MRCP sequences. 2D and 3D reconstruction performed by MR  technologist including MIP reconstruction and slab cholangiograms. If  performed with contrast, additional dynamic T1 post IV contrast  images.   CONTRAST: 8 mL Gadavist      FINDINGS:   MRCP: Bile ducts are normal in caliber. The common bile duct measures  4 mm. No choledocholithiasis. No obstructing mass.     LIVER: Marked hepatic steatosis. No liver lesions.     PANCREAS: No evidence of mass or pancreatitis.     ADDITIONAL FINDINGS: Cholecystectomy. Normal  spleen, adrenal glands,  and kidneys. No lymphadenopathy or ascites.                                                                      IMPRESSION:  1.  Hepatic steatosis.  2.  Cholecystectomy.  3.  Otherwise normal MRI/MRCP.     CATIE BOONE MD      ASSESSMENT AND PLAN:     1.  Hoda Brush is a 66-year-old woman with a history of ER-positive, SC-positive, HER2 positive breast cancer.  She is from [facility], formally from [facility], and came to live in the [place] with [place].  The tumor is ER positive, SC positive and HER2 positive.  She had metastatic disease at the time of presentation with bone-only metastases by report.  She underwent neoadjuvant CAF, had a left mastectomy, left axillary lymph node dissection, radiation to the right hip, which included the right iliac region where she had metastatic disease.  She was initially on tamoxifen but then was switched to letrozole.  She continues on this and every 3-week trastuzumab.  She has had no evidence of disease progression for the last 3 years.  Markers are low and stable.  We have continued Herceptin every 3 weeks as well as daily letrozole. CT CAP shows stable pulmonary nodules.  The left axillary lymph node is decreased compared with the prior CT.  2.  Episode of abdominal pain and elevated LFT's and mild intrahepatic biliar dilatation.  This problem has resolved and may have been related to a gallstone.  She had an MRCP which was normal except for fatty liver and status post cholecystectomy.  3.  Mild transaminase elevations.  Restaging showed no evidence of liver metastases.  These LFT elevations may be due to fatty liver.  4.  Continue the letrozole.  Letrozole has been well-tolerated.  5.  Echo. Stable EF. --Echo in  showed normal EF without change 60 to 65%. 2-16-21.  6.  Discussion of bone health and right maxillary osteonecrosis.  She will continue with calcium and vitamin D.  Restart Xgeva in 3 weeks for osteoporosis because right  mandible problem was treated by her dentist and has resolved.   7. Follow up. Continue Xgeva on 1-6, 2-17 and 3-31.  Continue trastuzumab 12-16, 1-6, 1-27, 2-17, 3-10, 3-31,  CT CAP on 3-09.  KARISSA visit 1-27 and with me 3-10.  CBC, CMP all visits.  CA27.29 and CEA 1-27 and 3-10.  Echocardiogram 3-10.      Thank you for allowing us to participate in this patient's care.      Sincerely,     Lisandro Aguiar M.D.   Professor   Division of Hematology, Oncology, Transplant   Department of Medicine   [employer] [facility] [employer]   [phone]        8:31-8:57  I spent 6 minutes with the patient more than 50% of which was in counseling and coordination of care.

## 2021-12-16 ENCOUNTER — VIRTUAL VISIT (OUTPATIENT)
Dept: ONCOLOGY | Facility: CLINIC | Age: 66
End: 2021-12-16
Attending: INTERNAL MEDICINE
Payer: COMMERCIAL

## 2021-12-16 ENCOUNTER — APPOINTMENT (OUTPATIENT)
Dept: LAB | Facility: CLINIC | Age: 66
End: 2021-12-16
Attending: INTERNAL MEDICINE
Payer: COMMERCIAL

## 2021-12-16 ENCOUNTER — MYC MEDICAL ADVICE (OUTPATIENT)
Dept: PODIATRY | Facility: CLINIC | Age: 66
End: 2021-12-16

## 2021-12-16 VITALS
HEART RATE: 84 BPM | BODY MASS INDEX: 31.01 KG/M2 | TEMPERATURE: 97.6 F | OXYGEN SATURATION: 98 % | DIASTOLIC BLOOD PRESSURE: 70 MMHG | WEIGHT: 175 LBS | RESPIRATION RATE: 16 BRPM | SYSTOLIC BLOOD PRESSURE: 107 MMHG

## 2021-12-16 DIAGNOSIS — C50.912 MALIGNANT NEOPLASM OF LEFT FEMALE BREAST, UNSPECIFIED ESTROGEN RECEPTOR STATUS, UNSPECIFIED SITE OF BREAST (H): ICD-10-CM

## 2021-12-16 DIAGNOSIS — C50.912 MALIGNANT NEOPLASM OF LEFT FEMALE BREAST, UNSPECIFIED ESTROGEN RECEPTOR STATUS, UNSPECIFIED SITE OF BREAST (H): Primary | ICD-10-CM

## 2021-12-16 DIAGNOSIS — Z51.11 ENCOUNTER FOR ANTINEOPLASTIC CHEMOTHERAPY: ICD-10-CM

## 2021-12-16 DIAGNOSIS — C79.51 BONE METASTASIS: Primary | ICD-10-CM

## 2021-12-16 LAB
ALBUMIN SERPL-MCNC: 3.3 G/DL (ref 3.4–5)
ALP SERPL-CCNC: 54 U/L (ref 40–150)
ALT SERPL W P-5'-P-CCNC: 63 U/L (ref 0–50)
ANION GAP SERPL CALCULATED.3IONS-SCNC: 7 MMOL/L (ref 3–14)
AST SERPL W P-5'-P-CCNC: 51 U/L (ref 0–45)
BASOPHILS # BLD AUTO: 0 10E3/UL (ref 0–0.2)
BASOPHILS NFR BLD AUTO: 0 %
BILIRUB SERPL-MCNC: 0.3 MG/DL (ref 0.2–1.3)
BUN SERPL-MCNC: 11 MG/DL (ref 7–30)
CALCIUM SERPL-MCNC: 8.6 MG/DL (ref 8.5–10.1)
CANCER AG27-29 SERPL-ACNC: 11 U/ML (ref 0–39)
CEA SERPL-MCNC: <0.5 UG/L (ref 0–2.5)
CHLORIDE BLD-SCNC: 109 MMOL/L (ref 94–109)
CHOLEST SERPL-MCNC: 238 MG/DL
CO2 SERPL-SCNC: 28 MMOL/L (ref 20–32)
CREAT SERPL-MCNC: 0.76 MG/DL (ref 0.52–1.04)
EOSINOPHIL # BLD AUTO: 0.3 10E3/UL (ref 0–0.7)
EOSINOPHIL NFR BLD AUTO: 5 %
ERYTHROCYTE [DISTWIDTH] IN BLOOD BY AUTOMATED COUNT: 13.4 % (ref 10–15)
FASTING STATUS PATIENT QL REPORTED: YES
GFR SERPL CREATININE-BSD FRML MDRD: 83 ML/MIN/1.73M2
GLUCOSE BLD-MCNC: 98 MG/DL (ref 70–99)
HCT VFR BLD AUTO: 38.9 % (ref 35–47)
HDLC SERPL-MCNC: 56 MG/DL
HGB BLD-MCNC: 12 G/DL (ref 11.7–15.7)
IMM GRANULOCYTES # BLD: 0 10E3/UL
IMM GRANULOCYTES NFR BLD: 0 %
LDLC SERPL CALC-MCNC: 159 MG/DL
LYMPHOCYTES # BLD AUTO: 3 10E3/UL (ref 0.8–5.3)
LYMPHOCYTES NFR BLD AUTO: 41 %
MCH RBC QN AUTO: 29.3 PG (ref 26.5–33)
MCHC RBC AUTO-ENTMCNC: 30.8 G/DL (ref 31.5–36.5)
MCV RBC AUTO: 95 FL (ref 78–100)
MONOCYTES # BLD AUTO: 0.5 10E3/UL (ref 0–1.3)
MONOCYTES NFR BLD AUTO: 7 %
NEUTROPHILS # BLD AUTO: 3.4 10E3/UL (ref 1.6–8.3)
NEUTROPHILS NFR BLD AUTO: 47 %
NONHDLC SERPL-MCNC: 182 MG/DL
NRBC # BLD AUTO: 0 10E3/UL
NRBC BLD AUTO-RTO: 0 /100
PLATELET # BLD AUTO: 244 10E3/UL (ref 150–450)
POTASSIUM BLD-SCNC: 3.9 MMOL/L (ref 3.4–5.3)
PROT SERPL-MCNC: 7.8 G/DL (ref 6.8–8.8)
RBC # BLD AUTO: 4.1 10E6/UL (ref 3.8–5.2)
SODIUM SERPL-SCNC: 144 MMOL/L (ref 133–144)
TRIGL SERPL-MCNC: 113 MG/DL
WBC # BLD AUTO: 7.2 10E3/UL (ref 4–11)

## 2021-12-16 PROCEDURE — 99212 OFFICE O/P EST SF 10 MIN: CPT | Mod: 95 | Performed by: INTERNAL MEDICINE

## 2021-12-16 PROCEDURE — 86300 IMMUNOASSAY TUMOR CA 15-3: CPT | Performed by: INTERNAL MEDICINE

## 2021-12-16 PROCEDURE — 258N000003 HC RX IP 258 OP 636: Performed by: INTERNAL MEDICINE

## 2021-12-16 PROCEDURE — 82378 CARCINOEMBRYONIC ANTIGEN: CPT | Performed by: INTERNAL MEDICINE

## 2021-12-16 PROCEDURE — 80053 COMPREHEN METABOLIC PANEL: CPT | Performed by: INTERNAL MEDICINE

## 2021-12-16 PROCEDURE — 96413 CHEMO IV INFUSION 1 HR: CPT

## 2021-12-16 PROCEDURE — 36591 DRAW BLOOD OFF VENOUS DEVICE: CPT | Performed by: INTERNAL MEDICINE

## 2021-12-16 PROCEDURE — 85048 AUTOMATED LEUKOCYTE COUNT: CPT | Performed by: INTERNAL MEDICINE

## 2021-12-16 PROCEDURE — 250N000011 HC RX IP 250 OP 636: Performed by: INTERNAL MEDICINE

## 2021-12-16 RX ORDER — MEPERIDINE HYDROCHLORIDE 25 MG/ML
25 INJECTION INTRAMUSCULAR; INTRAVENOUS; SUBCUTANEOUS EVERY 30 MIN PRN
Status: CANCELLED | OUTPATIENT
Start: 2021-12-16

## 2021-12-16 RX ORDER — HEPARIN SODIUM (PORCINE) LOCK FLUSH IV SOLN 100 UNIT/ML 100 UNIT/ML
5 SOLUTION INTRAVENOUS EVERY 8 HOURS
Status: DISCONTINUED | OUTPATIENT
Start: 2021-12-16 | End: 2021-12-16 | Stop reason: HOSPADM

## 2021-12-16 RX ORDER — HEPARIN SODIUM (PORCINE) LOCK FLUSH IV SOLN 100 UNIT/ML 100 UNIT/ML
5 SOLUTION INTRAVENOUS EVERY 8 HOURS
Status: CANCELLED | OUTPATIENT
Start: 2021-12-16

## 2021-12-16 RX ORDER — ALBUTEROL SULFATE 0.83 MG/ML
2.5 SOLUTION RESPIRATORY (INHALATION)
Status: CANCELLED | OUTPATIENT
Start: 2021-12-16

## 2021-12-16 RX ORDER — DIPHENHYDRAMINE HYDROCHLORIDE 50 MG/ML
50 INJECTION INTRAMUSCULAR; INTRAVENOUS
Status: CANCELLED
Start: 2021-12-16

## 2021-12-16 RX ORDER — ACETAMINOPHEN 325 MG/1
650 TABLET ORAL
Status: CANCELLED | OUTPATIENT
Start: 2021-12-16

## 2021-12-16 RX ORDER — SODIUM CHLORIDE 9 MG/ML
1000 INJECTION, SOLUTION INTRAVENOUS CONTINUOUS PRN
Status: CANCELLED
Start: 2021-12-16

## 2021-12-16 RX ORDER — DIPHENHYDRAMINE HCL 25 MG
50 CAPSULE ORAL ONCE
Status: CANCELLED
Start: 2021-12-16

## 2021-12-16 RX ORDER — EPINEPHRINE 0.3 MG/.3ML
0.3 INJECTION SUBCUTANEOUS EVERY 5 MIN PRN
Status: CANCELLED | OUTPATIENT
Start: 2021-12-16

## 2021-12-16 RX ORDER — HEPARIN SODIUM (PORCINE) LOCK FLUSH IV SOLN 100 UNIT/ML 100 UNIT/ML
5 SOLUTION INTRAVENOUS EVERY 8 HOURS PRN
Status: DISCONTINUED | OUTPATIENT
Start: 2021-12-16 | End: 2021-12-16 | Stop reason: HOSPADM

## 2021-12-16 RX ORDER — METHYLPREDNISOLONE SODIUM SUCCINATE 125 MG/2ML
125 INJECTION, POWDER, LYOPHILIZED, FOR SOLUTION INTRAMUSCULAR; INTRAVENOUS
Status: CANCELLED
Start: 2021-12-16

## 2021-12-16 RX ORDER — LORAZEPAM 2 MG/ML
0.5 INJECTION INTRAMUSCULAR EVERY 4 HOURS PRN
Status: CANCELLED
Start: 2021-12-16

## 2021-12-16 RX ORDER — EPINEPHRINE 1 MG/ML
0.3 INJECTION, SOLUTION INTRAMUSCULAR; SUBCUTANEOUS EVERY 5 MIN PRN
Status: CANCELLED | OUTPATIENT
Start: 2021-12-16

## 2021-12-16 RX ORDER — ALBUTEROL SULFATE 90 UG/1
1-2 AEROSOL, METERED RESPIRATORY (INHALATION)
Status: CANCELLED
Start: 2021-12-16

## 2021-12-16 RX ADMIN — TRASTUZUMAB 450 MG: 150 INJECTION, POWDER, LYOPHILIZED, FOR SOLUTION INTRAVENOUS at 14:00

## 2021-12-16 RX ADMIN — Medication 5 ML: at 12:28

## 2021-12-16 RX ADMIN — Medication 5 ML: at 14:32

## 2021-12-16 ASSESSMENT — PAIN SCALES - GENERAL: PAINLEVEL: NO PAIN (0)

## 2021-12-16 NOTE — TELEPHONE ENCOUNTER
Patient scheduled for 3:15 on 12/17 per Dr. Alfaro.  Patient's daughter notified.    Ariana Metz, ATC

## 2021-12-16 NOTE — PROGRESS NOTES
Infusion Nursing Note:  Hoda Brush presents today for Cycle 73 Day 1 Herceptin    Patient seen by provider today: No   present during visit today: Not Applicable.    Note: .      Intravenous Access:  Implanted Port.    Treatment Conditions:  Lab Results   Component Value Date    HGB 12.0 12/16/2021    WBC 7.2 12/16/2021    ANEU 3.1 06/22/2021    ANEUTAUTO 3.4 12/16/2021     12/16/2021      Lab Results   Component Value Date     12/16/2021    POTASSIUM 3.9 12/16/2021    CR 0.76 12/16/2021    TAYLER 8.6 12/16/2021    BILITOTAL 0.3 12/16/2021    ALBUMIN 3.3 (L) 12/16/2021    ALT 63 (H) 12/16/2021    AST 51 (H) 12/16/2021     Results reviewed, labs MET treatment parameters, ok to proceed with treatment.      Post Infusion Assessment:  Patient tolerated infusion without incident.  Blood return noted pre and post infusion.  No evidence of extravasations.  Access discontinued per protocol.       Discharge Plan:   Discharge instructions reviewed with: Patient.  Copy of AVS reviewed with patient and/or family.  Patient will return 1/6/2022 for next appointment.  Patient discharged in stable condition accompanied by: self.      Jacque Martino RN

## 2021-12-16 NOTE — LETTER
12/16/2021         RE: Hoda Brush  [address]  [place] [address]        Dear Colleague,    Thank you for referring your patient, Hoda Brush, to the [facility]. Please see a copy of my visit note below.    How would you like to obtain your AVS? MyChart  If the video visit is dropped, the invitation should be resent by: Text to cell phone: [phone]  Will anyone else be joining your video visit? No      Hoda is a patient from [facility] and is seen here for continuation of care for her metastatic ER+HER2- breast cancer.  She is a Lutheran refugee from [facility] and was initially seen in clinic with her daughter.  The only data we have from [facility] is an English translation of her records.       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  On 02/11/2014, she was seen by an oncologist.  The clinical staging of T4b N2 M1 was noted.   Her breast cancer was on the left side. There was no right breast cancer, confirmed by the patient and her daughter, correcting a possible error in the translated records.  ER was positive in 100% of the cells, [place] at 14% and HER2 was 3+ positive.  It appears that this histopathologic information is on the mastectomy specimen. She underwent an MRI for staging of presumptive bone metastases which was performed 03/02/2014.  There were skeletal metastases in the thoracic vertebrae at 12, L1, L3, L5 and S1 vertebral body, ranging in size from 0.7-3.0 cm in size.  Ischial and right femur were also involved, as well as a large iliac mass measuring about 15 cm in the uterus. 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   Radiation Oncology consultation was performed 03/11/2014, and she was given radiation in 1 dose of 6 Gy to the right iliac lesion.        With the initial diagnosis, she initiated treatment with 6 cycles of CAF neoadjuvant chemotherapy  02/14, 07/07, 03/28, 04/18/14, 05/08 and 05/29/14. She also received monthly zoledronic acid.  She then underwent a modified left mastectomy of Palacios type and left lymphadenoectomy on 06/27/2014.   Pathologic examination showed number at [facility] was [id number] showed infiltrative grade 2 ductal cancer with 6 level 1 metastatic [facility]ymp[facility] and trastuzumab on 2-5, 2-26, 3-19 with CBC, CMP.  Echocardiogram on 3-19.  Follow up with me 4-9 with CBC, CMP, CA27.29 and CEA and with CT CAP on 4-8.h nodes and 3 level 2 metastatic lymph nodes.  The differentiation was intermediate.  The tumor was ER positive 70% of the cells, ND positive in 40% of the cells and HER2 was 3+ by immunohistochemistry and the Ki-67 labeling index was 20%. Her staging after surgery was stage IV, pT4b N2 M1.  I don't see a biopsy of the skeletal metastases.  She then had continuation with chemotherapy with 4 cycles of Herceptin and taxane with monthly zoledronic acid.  Tamoxifen was initiated.  She then had two years of Herceptin and a decision was made not to continue further Herceptin.  She continued on zoledronic acid every 3 months. She was clinically stable. [place] then [place] to the [place] new refugee from [facility].  She has now been changed to letrozole.  Denosumab has now been held for new diagnosis of osteonecrosis of the R maxilla.     History of cholecystectomy 2020.      TREATMENT HISTORY:  A. Initial diagnosis with metastatic breast cancer in [facility].  Neoadjuvant CAF x 6.   B. Left mastectomy. Left axillary node dissection.  C.  Radiation in 1 dose to R iliac region.  C. Herceptin for 2 years taxane for a prescribed course then stopped, monthly zoledronic acid.  She had 2 years of Herceptin with tamoxifen added after chemotherapy.  D.  Tamoxifen alone and zoledronic acid every 3 months starting 2014.  Letrozole was started   E.  [place]  We restarted Herceptin every 3 weeks and continued  tamoxifen. Bone targeted agent changed to denosumab every 9 weeks. Zometa discontinued 2017.  Tamoxifen was changed to letrozole August 2019.    GIANLUCA  Xgeva discontinued 12-17-19 because of dental issues.   G.  J+J vaccine March, 2021.  REJI.  Was on Zometa once May 11.  Reaction with eye lid swelling. Discontinued Zometal  H.  Restart Xgeva 6-22-21.  Continuing the trastuzumab and letrozole.  Both tolerated well.       INTERVAL HISTORY  Hoda has been doing well.  Her right upper quadrant discomfort has completely resolved.  She has no jaundice.  Her MRCP was normal.  She has no pain except for chronic low back pain.  She denies fatigue, depression, anxiety.     REVIEW OF SYSTEMS:   Hoda has been feeling generally well.  Mild bone and back discomfort but not new, no fatigue depression or anxiety today she denies fevers or chills, cough, chest pain, shortness of breath. She did have mild nausea, vomiting, now resolved.  No constipation, diarrhea, bone pain, back pain, or headache.  She denies gastroesophageal reflux symptoms and we've discontinued famotidine.  The remainder of a 10 point ROS was negative.      PHYSICAL EXAM: She appeared generally well.  No jaundice.  Mood and affect were normal.  No alopecia     This was a phone visit with a Monegasque .    Mood and affect normal. No jaundice.      Labs   pending     ECG: First-degree AV block otherwise normal ECG.     Imaging   12-13-21  CT CHEST/ABDOMEN/PELVIS WITH CONTRAST 12/13/2021 12:31 PM     CLINICAL HISTORY: Metastatic breast cancer (H). Malignant neoplasm of  left female breast, unspecified estrogen receptor status, unspecified  site of breast (H).     TECHNIQUE: CT scan of the chest, abdomen, and pelvis was performed  following injection of IV contrast. Multiplanar reformats were  obtained. Dose reduction techniques were used.   CONTRAST: Isovue 370 109 cc     COMPARISON: 10/18/2021.     FINDINGS:   LUNGS AND PLEURA: 0.5 cm right middle lobe nodule  (series 9, image  195) is unchanged. 0.5 cm left lower lobe nodule (series 9, image 202)  is unchanged. Other smaller pulmonary nodules are stable. No  effusions.     MEDIASTINUM/AXILLAE: Right-sided internal jugular vein Port-A-Cath  terminates at the atriocaval junction. A prominent left axillary lymph  node measures 0.5 cm in short axis (series 8, 117), smaller than on  the prior study when it measured 0.7 cm. No enlarged mediastinal or  hilar lymph nodes. A left mastectomy has been performed.     CORONARY ARTERY CALCIFICATION: None.     HEPATOBILIARY: Mild diffuse decreased attenuation of the liver  compatible with fatty infiltration. Low-density along the gallbladder  fossa may represent focal fat and is unchanged. The gallbladder is  absent.     PANCREAS: Normal.     SPLEEN: Normal.     ADRENAL GLANDS: Normal.     KIDNEYS/BLADDER: Normal.     BOWEL: Normal.     PELVIC ORGANS: Multiple uterine fibroids are noted.     ADDITIONAL FINDINGS: No ascites or adenopathy.     MUSCULOSKELETAL: Diffuse bony metastases appear unchanged.                                                                   IMPRESSION:  1.  Stable pulmonary nodules.  2.  Prominent left axillary lymph node is smaller than on the prior  study.  3.  Skeletal metastases appear stable.      CHAY GAUTHIER MD         MR ABDOMEN MAGNETIC RESONANCE CHOLANGIOPANCREATOGRAPHY WITHOUT AND  WITH CONTRAST 11/4/2021 8:04 AM      HISTORY: Abdominal pain, biliary obstruction suspected (Ped 0-18y).  Malignant neoplasm of left female breast, unspecified estrogen  receptor status, unspecified site of breast (H).     COMPARISON: CT 10/18/2021     TECHNIQUE: Routine MR liver/pancreas protocol including axial and  coronal MRCP sequences. 2D and 3D reconstruction performed by MR  technologist including MIP reconstruction and slab cholangiograms. If  performed with contrast, additional dynamic T1 post IV contrast  images.   CONTRAST: 8 mL Gadavist      FINDINGS:    MRCP: Bile ducts are normal in caliber. The common bile duct measures  4 mm. No choledocholithiasis. No obstructing mass.     LIVER: Marked hepatic steatosis. No liver lesions.     PANCREAS: No evidence of mass or pancreatitis.     ADDITIONAL FINDINGS: Cholecystectomy. Normal spleen, adrenal glands,  and kidneys. No lymphadenopathy or ascites.                                                                      IMPRESSION:  1.  Hepatic steatosis.  2.  Cholecystectomy.  3.  Otherwise normal MRI/MRCP.     CATIE BOONE MD      ASSESSMENT AND PLAN:     1.  Hoda Brush is a 66-year-old woman with a history of ER-positive, NV-positive, HER2 positive breast cancer.  She is from [facility], formally from [facility], and came [place] live in the [place] with [place].  The tumor is ER positive, NV positive and HER2 positive.  She had metastatic disease at the time of presentation with bone-only metastases by report.  She underwent neoadjuvant CAF, had a left mastectomy, left axillary lymph node dissection, radiation to the right hip, which included the right iliac region where she had metastatic disease.  She was initially on tamoxifen but then was switched to letrozole.  She continues on this and every 3-week trastuzumab.  She has had no evidence of disease progression for the last 3 years.  Markers are low and stable.  We have continued Herceptin every 3 weeks as well as daily letrozole. CT CAP shows stable pulmonary nodules.  The left axillary lymph node is decreased compared with the prior CT.  2.  Episode of abdominal pain and elevated LFT's and mild intrahepatic biliar dilatation.  This problem has resolved and may have been related to a gallstone.  She had an MRCP which was normal except for fatty liver and status post cholecystectomy.  3.  Mild transaminase elevations.  Restaging showed no evidence of liver metastases.  These LFT elevations may be due to fatty liver.  4.  Continue the letrozole.  Letrozole  has been well-tolerated.  5.  Echo. Stable EF. --Echo in  showed normal EF without change 60 to 65%. 2-16-21.  6.  Discussion of bone health and right maxillary osteonecrosis.  She will continue with calcium and vitamin D.  Restart Xgeva in 3 weeks for osteoporosis because right mandible problem was treated by her dentist and has resolved.   7. Follow up. Continue Xgeva on 1-6, 2-17 and 3-31.  Continue trastuzumab 12-16, 1-6, 1-27, 2-17, 3-10, 3-31,  CT CAP on 3-09.  KARISSA visit 1-27 and with me 3-10.  CBC, CMP all visits.  CA27.29 and CEA 1-27 and 3-10.  Echocardiogram 3-10.      Thank you for allowing us to participate in this patient's care.      Sincerely,     Lisandro Aguiar M.D.   Professor   Division of Hematology, Oncology, Transplant   Department of Medicine   [employer] [facility] [employer]   [phone]        8:31-8:57  I spent 6 minutes with the patient more than 50% of which was in counseling and coordination of care.       Again, thank you for allowing me to participate in the care of your patient.        Sincerely,        Lisandro Aguiar MD

## 2021-12-16 NOTE — TELEPHONE ENCOUNTER
Called patient's daughter.  They are unable to make the available appointments today, have been added to the wait list in case of a cancellation.  Advised her that if they are concerned about infection, they should go to urgent care to have it addressed.  We will call if Dr. Alfaro has further recommendations after seeing the attached picture.    Ariana Metz, ATC

## 2021-12-17 ENCOUNTER — OFFICE VISIT (OUTPATIENT)
Dept: PODIATRY | Facility: CLINIC | Age: 66
End: 2021-12-17
Payer: COMMERCIAL

## 2021-12-17 VITALS
DIASTOLIC BLOOD PRESSURE: 77 MMHG | SYSTOLIC BLOOD PRESSURE: 110 MMHG | BODY MASS INDEX: 31.01 KG/M2 | HEIGHT: 63 IN | WEIGHT: 175 LBS

## 2021-12-17 DIAGNOSIS — L03.031 PARONYCHIA OF GREAT TOE OF RIGHT FOOT: Primary | ICD-10-CM

## 2021-12-17 PROCEDURE — 11042 DBRDMT SUBQ TIS 1ST 20SQCM/<: CPT | Performed by: PODIATRIST

## 2021-12-17 PROCEDURE — 99213 OFFICE O/P EST LOW 20 MIN: CPT | Mod: 25 | Performed by: PODIATRIST

## 2021-12-17 RX ORDER — CEPHALEXIN 500 MG/1
500 CAPSULE ORAL 3 TIMES DAILY
Qty: 21 CAPSULE | Refills: 0 | Status: SHIPPED | OUTPATIENT
Start: 2021-12-17 | End: 2021-12-24

## 2021-12-17 ASSESSMENT — MIFFLIN-ST. JEOR: SCORE: 1303.95

## 2021-12-17 NOTE — RESULT ENCOUNTER NOTE
Phil Drummond,    This is to inform you regarding your test result.    Sorry, there was delay in getting lipid panel result as they had shortage of the reagent.   Your total cholesterol is elevated.  HDL which is called good cholesterol is normal.  Your LDL which is called bad cholesterol is elevated.  Eat low cholesterol low fat  diet and do regular physical activity. Avoid high sugar containing food.  If you would like to see a dietitian regarding this then please let us know so we can put a referral for you to see a dietitian.          Sincerely,      Dr.Nasima Luiz MD,FACP

## 2021-12-17 NOTE — PROGRESS NOTES
"Foot & Ankle Surgery   December 17, 2021    S:  Pt is seen today for evaluation of continued paronychia lateral right hallux.  She underwent a partial permanent avulsion by myself 10/25/2021 and followed up on 11/22/2021 for some continued inflammation and drainage.  At the time it seemed to be healing uneventfully and I certainly didn't see sufficient evidence to warrant I&D.  She comes in today with continued issues and in fact has some increasing redness in the area.    Vitals:    12/17/21 1506   BP: 110/77   Weight: 79.4 kg (175 lb)   Height: 1.594 m (5' 2.75\")   '      ROS - Pos for CC.  Patient denies current nausea, vomiting, chills, fevers, belly pain, calf pain, chest pain or SOB.  Complete remainder of ROS it otherwise neg.      PE:  Gen:   No apparent distress  Eye:    Visual scanning without deficit  Ear:    Response to auditory stimuli wnl  Lung:    Non-labored breathing on RA noted  Abd:    NTND per patient report  Lymph:    Neg for pitting/non-pitting edema BLE  Vasc:    Pulses palpable, CFT minimally delayed  Neuro:    Light touch sensation intact to all sensory nerve distributions without paresthesias  Derm:    Right great toe -lateral nail fold presents with hyper granular tissue and mild paronychia with slight serous drainage although cellulitis and purulence are not appreciated  MSK:    ROM, strength wnl without limitation, no pain on palpation noted.  Calf:    Neg for redness, swelling or tenderness      Assessment:  66 year old female with persistent paronychia lateral right hallux approximately 7 weeks after partial permanent nail avulsion      Plan:  Discussed etiologies, anatomy and options  1.  Persistent paronychia lateral right hallux approximately 7 weeks after partial permanent nail avulsion  -an I&D of the area was performed, see procedure note for details.  -A prescription for Keflex 500 mg 3 times daily x7 days was dispensed  -post procedure instructions were discussed with the " patient    Procedure -the toe was anesthetized with 5 cc of 1% lidocaine plain.  It was then prepped with Betadine.  Using sterile instrumentation, the lateral nail fold was curettaged and debrided full-thickness down to including subcu fat less than 20 cm .  A small portion of a nail spicule was clipped using an English anvil.  There was mild amount of debris in the area but no necrosis or purulence was appreciated.  Patient tolerated the procedure well without complication    Follow up: As needed or sooner with acute issues           Aries Alfaro DPM FACW. D. [facility] FACFAOM  Podiatric Foot & Ankle Surgeon  [facility]  [phone]    Disclaimer: This note consists of symbols derived from keyboarding, dictation and/or voice recognition software. As a result, there may be errors in the script that have gone undetected. Please consider this when interpreting information found in this chart.

## 2022-01-05 NOTE — PROGRESS NOTES
Hoda is a 66 year old who is being evaluated via a billable video visit.      How would you like to obtain your AVS? MyChart  If the video visit is dropped, the invitation should be resent by: Send to e-mail at: [email or web address]  Will anyone else be joining your video visit? No      Video Start Time: 7:43 AM  Video-Visit Details    Type of service:  Video Visit    Video End Time:7:51 AM    Originating Location (pt. Location): Home    Distant Location (provider location):  [facility]     Platform used for Video Visit: Nidia     Kerwin Drummond is a patient from [facility] and is seen here for continuation of care for her metastatic ER+HER2- breast cancer.  She is a [employer] refugee from [facility] and was initially seen in clinic with her daughter.  The only data we have from [email or web address] is an English translation of her records.       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  On 02/11/2014, she was seen by an oncologist.  The clinical staging of T4b N2 M1 was noted.   Her breast cancer was on the left side. There was no right breast cancer, confirmed by the patient and her daughter, correcting a possible error in the translated records.  ER was positive in 100% of the cells, WI at 14% and HER2 was 3+ positive.  It appears that this histopathologic information is on the mastectomy specimen. She underwent an MRI for staging of presumptive bone metastases which was performed 03/02/2014.  There were skeletal metastases in the thoracic vertebrae at 12, L1, L3, L5 and S1 vertebral body, ranging in size from 0.7-3.0 cm in size.  Ischial and right femur were also involved, as well as a large iliac mass measuring about 15 cm in the uterus. 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   Radiation Oncology consultation was performed 03/11/2014, and she was given radiation in 1 dose of 6 Gy to the right iliac lesion.         With the initial diagnosis, she initiated treatment with 6 cycles of CAF neoadjuvant chemotherapy 02/14, 07/07, 03/28, 04/18/14, 05/08 and 05/29/14. She also received monthly zoledronic acid.  She then underwent a modified left mastectomy of Palacios type and left lymphadenoectomy on 06/27/2014.   Pathologic examination showed number at [facility] was [id number] showed infiltrative grade 2 ductal cancer with 6 level 1 metastatic lymph nodes and 3 level 2 metastatic lymph nodes.  The differentiation was intermediate.  The tumor was ER positive 70% of the cells, KS positive in 40% of the cells and HER2 was 3+ by immunohistochemistry and the Ki-67 labeling index was 20%. Her staging after surgery was stage IV, pT4b N2 M1.  I don't see a biopsy of the skeletal metastases.  She then had continuation with chemotherapy with 4 cycles of Herceptin and taxane with monthly zoledronic acid.  Tamoxifen was initiated.  She then had two years of Herceptin and a decision was made not to continue further Herceptin.  She continued on zoledronic acid every 3 months. She was clinically stable. [place] then [place] to the [place] new [place] from [facility].  She has now been changed to letrozole.  Denosumab has now been held for new diagnosis of osteonecrosis of the R maxilla.     History of cholecystectomy 2020.    Seen in the ED on 10/4/2021, for acute abdominal pain.  Elevated LFTs, elevated lipase.  Question of stone versus pancreatitis.  She was discharged and followed up with an outpatient gastroenterology clinic.  They recommended clear liquid diet and ERCP.        TREATMENT HISTORY:  A. Initial diagnosis with metastatic breast cancer in [facility].  Neoadjuvant CAF x 6.   B. Left mastectomy. Left axillary node dissection.  C.  Radiation in 1 dose to R iliac region.  C. Herceptin for 2 years taxane for a prescribed course then stopped, monthly zoledronic acid.  She had 2 years of Herceptin with tamoxifen added after  chemotherapy.  D.  Tamoxifen alone and zoledronic acid every 3 months starting 2014.  Letrozole was started   E.  [place]  We restarted Herceptin every 3 weeks and continued tamoxifen. Bone targeted agent changed to denosumab every 9 weeks. Zometa discontinued 2017.  Tamoxifen was changed to letrozole August 2019.    F.  Xgeva discontinued 12-17-19 because of dental issues.   G.  J+J vaccine March, 2021.  H.  Was on Zometa once May 11.  Reaction with eye lid swelling. Discontinued Zometal  H.  Restart Xgeva 6-22-21.  Continuing the trastuzumab and letrozole.  Both tolerated well.       INTERVAL HISTORY Hoda is feeling well. She reports no concerns today. She continues on letrozole and trastuzumab and is tolerating treatment well. She is taking trazodone PRN for insomnia and this is working well.     She denies any fevers/chills, cough, SOB, CP, edema, anorexia, n/v/d/c, abdominal pain, new or different pain.        A 10 point review of systems is negative.     PHYSICAL EXAM:  There were no vitals taken for this visit.    Wt Readings from Last 4 Encounters:   12/17/21 79.4 kg (175 lb)   12/16/21 79.4 kg (175 lb)   12/09/21 81.2 kg (179 lb)   11/23/21 80.1 kg (176 lb 10.1 oz)     Video physical exam  General: Patient appears well in no acute distress.   Skin: No visualized rash or lesions on visualized skin  Eyes: EOMI, no erythema, sclera icterus or discharge noted  Resp: Appears to be breathing comfortably without accessory muscle usage, speaking in full sentences, no cough  MSK: Appears to have normal range of motion based on visualized movements  Neurologic: No apparent tremors, facial movements symmetric  Psych: affect bright, alert and oriented    The rest of a comprehensive physical examination is deferred due to PHE (public health emergency) video restrictions      Labs  Most Recent 3 CBC's:  Recent Labs   Lab Test 12/16/21  1235 11/23/21  1256 11/02/21  1206   WBC 7.2 8.1 8.1   HGB 12.0 12.2 12.4   MCV  95 94 94    203 190    Most Recent 3 BMP's:  Recent Labs   Lab Test 12/16/21  1235 11/23/21  1256 11/02/21  1206    143 140   POTASSIUM 3.9 4.3 4.2   CHLORIDE 109 109 108   CO2 28 28 29   BUN 11 12 17   CR 0.76 0.69 0.66   ANIONGAP 7 6 3   TAYLER 8.6 8.7 9.2   GLC 98 80 83    Most Recent 2 LFT's:  Recent Labs   Lab Test 12/16/21  1235 11/23/21  1256   AST 51* 58*   ALT 63* 82*   ALKPHOS 54 51   BILITOTAL 0.3 0.3      I reviewed the above labs today.     Labs will be drawn later today        ASSESSMENT AND PLAN:     1.  Hoda Brush is a 66-year-old woman with a history of ER-positive, IA-positive, HER2 positive breast cancer.  She is from [facility], formally from [facility], and came to live in the [place] with [place].  The tumor is ER positive, IA positive and HER2 positive.  She had metastatic disease at the time of presentation with bone-only metastases by report.  She underwent neoadjuvant CAF, had a left mastectomy, left axillary lymph node dissection, radiation to the right hip, which included the right iliac region where she had metastatic disease.  She was initially on tamoxifen but then was switched to letrozole.  She continues on this and every 3-week trastuzumab.  She has had no evidence of disease progression for the last 3 years.  We have continued Herceptin every 3 weeks as well as daily letrozole.   -- Tumor markers have been stable, will be drawn later today   - CT CAP 12/13 showed stable disease   -- Proceed with herceptin today. Continue letrozole   -- Repeat CT CAP in March   -- Echo due March, scheduled     2.  Elevated LFTs, lipase, with abdominal pain- Resolved   -- Has no abdominal pain today, LFTs and bili continue to improve   -- MRCP 11/4 normal other than hepatic steatosis     3. Bone health  - Xgeva was on hold due to possible ONJ. See Dr. Aguiar's note from 12/16--plan to resume today   - Continue Calcium and Vitamin D    4. Insomnia  - Continue trazodone 25mg PRN  at bedtime.     15 minutes spent on the date of the encounter doing chart review, review of test results, interpretation of tests, patient visit and documentation     Anne Lea PA-C

## 2022-01-06 ENCOUNTER — VIRTUAL VISIT (OUTPATIENT)
Dept: ONCOLOGY | Facility: CLINIC | Age: 67
End: 2022-01-06
Attending: PHYSICIAN ASSISTANT
Payer: COMMERCIAL

## 2022-01-06 ENCOUNTER — APPOINTMENT (OUTPATIENT)
Dept: LAB | Facility: CLINIC | Age: 67
End: 2022-01-06
Attending: INTERNAL MEDICINE
Payer: COMMERCIAL

## 2022-01-06 ENCOUNTER — INFUSION THERAPY VISIT (OUTPATIENT)
Dept: ONCOLOGY | Facility: CLINIC | Age: 67
End: 2022-01-06
Attending: INTERNAL MEDICINE
Payer: COMMERCIAL

## 2022-01-06 VITALS
OXYGEN SATURATION: 96 % | RESPIRATION RATE: 18 BRPM | HEART RATE: 84 BPM | TEMPERATURE: 97.6 F | SYSTOLIC BLOOD PRESSURE: 134 MMHG | DIASTOLIC BLOOD PRESSURE: 73 MMHG | WEIGHT: 177 LBS | BODY MASS INDEX: 31.6 KG/M2

## 2022-01-06 DIAGNOSIS — C79.51 BONE METASTASIS: ICD-10-CM

## 2022-01-06 DIAGNOSIS — C50.919 METASTATIC BREAST CANCER: Primary | ICD-10-CM

## 2022-01-06 DIAGNOSIS — C50.912 MALIGNANT NEOPLASM OF LEFT FEMALE BREAST, UNSPECIFIED ESTROGEN RECEPTOR STATUS, UNSPECIFIED SITE OF BREAST (H): Primary | ICD-10-CM

## 2022-01-06 LAB
ALBUMIN SERPL-MCNC: 3.5 G/DL (ref 3.4–5)
ALP SERPL-CCNC: 50 U/L (ref 40–150)
ALT SERPL W P-5'-P-CCNC: 55 U/L (ref 0–50)
ANION GAP SERPL CALCULATED.3IONS-SCNC: 6 MMOL/L (ref 3–14)
AST SERPL W P-5'-P-CCNC: 36 U/L (ref 0–45)
BASOPHILS # BLD AUTO: 0 10E3/UL (ref 0–0.2)
BASOPHILS NFR BLD AUTO: 1 %
BILIRUB SERPL-MCNC: 0.3 MG/DL (ref 0.2–1.3)
BUN SERPL-MCNC: 14 MG/DL (ref 7–30)
CALCIUM SERPL-MCNC: 8.9 MG/DL (ref 8.5–10.1)
CANCER AG27-29 SERPL-ACNC: 8 U/ML (ref 0–39)
CEA SERPL-MCNC: 1 UG/L (ref 0–2.5)
CHLORIDE BLD-SCNC: 108 MMOL/L (ref 94–109)
CO2 SERPL-SCNC: 28 MMOL/L (ref 20–32)
CREAT SERPL-MCNC: 0.75 MG/DL (ref 0.52–1.04)
EOSINOPHIL # BLD AUTO: 0.4 10E3/UL (ref 0–0.7)
EOSINOPHIL NFR BLD AUTO: 4 %
ERYTHROCYTE [DISTWIDTH] IN BLOOD BY AUTOMATED COUNT: 13.5 % (ref 10–15)
GFR SERPL CREATININE-BSD FRML MDRD: 88 ML/MIN/1.73M2
GLUCOSE BLD-MCNC: 69 MG/DL (ref 70–99)
HCT VFR BLD AUTO: 39.7 % (ref 35–47)
HGB BLD-MCNC: 12.1 G/DL (ref 11.7–15.7)
IMM GRANULOCYTES # BLD: 0 10E3/UL
IMM GRANULOCYTES NFR BLD: 0 %
LYMPHOCYTES # BLD AUTO: 3.3 10E3/UL (ref 0.8–5.3)
LYMPHOCYTES NFR BLD AUTO: 39 %
MCH RBC QN AUTO: 29.4 PG (ref 26.5–33)
MCHC RBC AUTO-ENTMCNC: 30.5 G/DL (ref 31.5–36.5)
MCV RBC AUTO: 96 FL (ref 78–100)
MONOCYTES # BLD AUTO: 0.6 10E3/UL (ref 0–1.3)
MONOCYTES NFR BLD AUTO: 8 %
NEUTROPHILS # BLD AUTO: 4 10E3/UL (ref 1.6–8.3)
NEUTROPHILS NFR BLD AUTO: 48 %
NRBC # BLD AUTO: 0 10E3/UL
NRBC BLD AUTO-RTO: 0 /100
PLATELET # BLD AUTO: 240 10E3/UL (ref 150–450)
POTASSIUM BLD-SCNC: 4 MMOL/L (ref 3.4–5.3)
PROT SERPL-MCNC: 7.6 G/DL (ref 6.8–8.8)
RBC # BLD AUTO: 4.12 10E6/UL (ref 3.8–5.2)
SODIUM SERPL-SCNC: 142 MMOL/L (ref 133–144)
WBC # BLD AUTO: 8.3 10E3/UL (ref 4–11)

## 2022-01-06 PROCEDURE — 96372 THER/PROPH/DIAG INJ SC/IM: CPT | Mod: 59 | Performed by: INTERNAL MEDICINE

## 2022-01-06 PROCEDURE — 250N000011 HC RX IP 250 OP 636: Performed by: INTERNAL MEDICINE

## 2022-01-06 PROCEDURE — 96372 THER/PROPH/DIAG INJ SC/IM: CPT | Performed by: INTERNAL MEDICINE

## 2022-01-06 PROCEDURE — 99213 OFFICE O/P EST LOW 20 MIN: CPT | Mod: 95 | Performed by: PHYSICIAN ASSISTANT

## 2022-01-06 PROCEDURE — 86300 IMMUNOASSAY TUMOR CA 15-3: CPT | Performed by: INTERNAL MEDICINE

## 2022-01-06 PROCEDURE — G0463 HOSPITAL OUTPT CLINIC VISIT: HCPCS | Mod: PN,RTG | Performed by: PHYSICIAN ASSISTANT

## 2022-01-06 PROCEDURE — 85025 COMPLETE CBC W/AUTO DIFF WBC: CPT | Performed by: INTERNAL MEDICINE

## 2022-01-06 PROCEDURE — 80053 COMPREHEN METABOLIC PANEL: CPT | Performed by: INTERNAL MEDICINE

## 2022-01-06 PROCEDURE — 258N000003 HC RX IP 258 OP 636: Performed by: PHYSICIAN ASSISTANT

## 2022-01-06 PROCEDURE — 250N000011 HC RX IP 250 OP 636: Performed by: PHYSICIAN ASSISTANT

## 2022-01-06 PROCEDURE — 36591 DRAW BLOOD OFF VENOUS DEVICE: CPT | Performed by: INTERNAL MEDICINE

## 2022-01-06 PROCEDURE — 96413 CHEMO IV INFUSION 1 HR: CPT

## 2022-01-06 PROCEDURE — 82378 CARCINOEMBRYONIC ANTIGEN: CPT | Performed by: INTERNAL MEDICINE

## 2022-01-06 RX ORDER — HEPARIN SODIUM (PORCINE) LOCK FLUSH IV SOLN 100 UNIT/ML 100 UNIT/ML
5 SOLUTION INTRAVENOUS EVERY 8 HOURS
Status: CANCELLED | OUTPATIENT
Start: 2022-01-06

## 2022-01-06 RX ORDER — SODIUM CHLORIDE 9 MG/ML
1000 INJECTION, SOLUTION INTRAVENOUS CONTINUOUS PRN
Status: CANCELLED
Start: 2022-01-06

## 2022-01-06 RX ORDER — EPINEPHRINE 1 MG/ML
0.3 INJECTION, SOLUTION INTRAMUSCULAR; SUBCUTANEOUS EVERY 5 MIN PRN
Status: CANCELLED | OUTPATIENT
Start: 2022-01-06

## 2022-01-06 RX ORDER — LORAZEPAM 2 MG/ML
0.5 INJECTION INTRAMUSCULAR EVERY 4 HOURS PRN
Status: CANCELLED
Start: 2022-01-06

## 2022-01-06 RX ORDER — ALBUTEROL SULFATE 90 UG/1
1-2 AEROSOL, METERED RESPIRATORY (INHALATION)
Status: CANCELLED
Start: 2022-01-06

## 2022-01-06 RX ORDER — MEPERIDINE HYDROCHLORIDE 25 MG/ML
25 INJECTION INTRAMUSCULAR; INTRAVENOUS; SUBCUTANEOUS EVERY 30 MIN PRN
Status: CANCELLED | OUTPATIENT
Start: 2022-01-06

## 2022-01-06 RX ORDER — HEPARIN SODIUM (PORCINE) LOCK FLUSH IV SOLN 100 UNIT/ML 100 UNIT/ML
5 SOLUTION INTRAVENOUS EVERY 8 HOURS
Status: DISCONTINUED | OUTPATIENT
Start: 2022-01-06 | End: 2022-01-06 | Stop reason: HOSPADM

## 2022-01-06 RX ORDER — ACETAMINOPHEN 325 MG/1
650 TABLET ORAL
Status: CANCELLED | OUTPATIENT
Start: 2022-01-06

## 2022-01-06 RX ORDER — ALBUTEROL SULFATE 0.83 MG/ML
2.5 SOLUTION RESPIRATORY (INHALATION)
Status: CANCELLED | OUTPATIENT
Start: 2022-01-06

## 2022-01-06 RX ORDER — METHYLPREDNISOLONE SODIUM SUCCINATE 125 MG/2ML
125 INJECTION, POWDER, LYOPHILIZED, FOR SOLUTION INTRAMUSCULAR; INTRAVENOUS
Status: CANCELLED
Start: 2022-01-06

## 2022-01-06 RX ORDER — DIPHENHYDRAMINE HCL 25 MG
50 CAPSULE ORAL ONCE
Status: CANCELLED
Start: 2022-01-06

## 2022-01-06 RX ORDER — DIPHENHYDRAMINE HYDROCHLORIDE 50 MG/ML
50 INJECTION INTRAMUSCULAR; INTRAVENOUS
Status: CANCELLED
Start: 2022-01-06

## 2022-01-06 RX ORDER — EPINEPHRINE 0.3 MG/.3ML
0.3 INJECTION SUBCUTANEOUS EVERY 5 MIN PRN
Status: CANCELLED | OUTPATIENT
Start: 2022-01-06

## 2022-01-06 RX ADMIN — Medication 5 ML: at 12:56

## 2022-01-06 RX ADMIN — Medication 5 ML: at 14:03

## 2022-01-06 RX ADMIN — TRASTUZUMAB 450 MG: 150 INJECTION, POWDER, LYOPHILIZED, FOR SOLUTION INTRAVENOUS at 13:58

## 2022-01-06 RX ADMIN — DENOSUMAB 120 MG: 120 INJECTION SUBCUTANEOUS at 14:00

## 2022-01-06 RX ADMIN — SODIUM CHLORIDE 250 ML: 9 INJECTION, SOLUTION INTRAVENOUS at 13:58

## 2022-01-06 ASSESSMENT — PAIN SCALES - GENERAL: PAINLEVEL: NO PAIN (0)

## 2022-01-06 NOTE — NURSING NOTE
Chief Complaint   Patient presents with     Port Draw     power needle. heparin locked, vitals checked     Pia Schneider RN on 1/6/2022 at 12:49 PM

## 2022-01-06 NOTE — NURSING NOTE
Patient verified meds and allergies are correct via patients echeck in.    Kerwin Cooper, Virtual Facilitator

## 2022-01-06 NOTE — PROGRESS NOTES
Infusion Nursing Note:  Hoda Brush presents today for C74D1 Herceptin-Xgeva.    Patient seen by provider today: Yes: SEMAJ Crockett   present during visit today: Yes, Language: Estonian.     Note: Pt saw provider prior to infusion, ok for treatment.      Intravenous Access:  Implanted Port.    Treatment Conditions:  Lab Results   Component Value Date    HGB 12.1 01/06/2022    WBC 8.3 01/06/2022    ANEU 3.1 06/22/2021    ANEUTAUTO 4.0 01/06/2022     01/06/2022      Lab Results   Component Value Date     01/06/2022    POTASSIUM 4.0 01/06/2022    CR 0.75 01/06/2022    TAYLER 8.9 01/06/2022    BILITOTAL 0.3 01/06/2022    ALBUMIN 3.5 01/06/2022    ALT 55 (H) 01/06/2022    AST 36 01/06/2022     Results reviewed, labs MET treatment parameters, ok to proceed with treatment.  ECHO/MUGA completed 11/30/21  EF 55-60%.      Post Infusion Assessment:  Patient tolerated infusion without incident.  Blood return noted pre and post infusion.  Site patent and intact, free from redness, edema or discomfort.  No evidence of extravasations.  Access discontinued per protocol.       Discharge Plan:   Patient declined prescription refills.  Discharge instructions reviewed with: Patient.  Patient and/or family verbalized understanding of discharge instructions and all questions answered.  AVS to patient via [employer].  Patient will return 1/27 for next appointment.   Patient discharged in stable condition accompanied by: self.  Departure Mode: Ambulatory.    Susu Seay RN

## 2022-01-06 NOTE — LETTER
1/6/2022         RE: Hoda Brush  [address]  [place] [address]        Dear Colleague,    Thank you for referring your patient, Hoda Brush, to the [facility] CANCER [facility]. Please see a copy of my visit note below.    Hoda is a 66 year old who is being evaluated via a billable video visit.      How would you like to obtain your AVS? MyChart  If the video visit is dropped, the invitation should be resent by: Send to e-mail at: [email or web address]  Will anyone else be joining your video visit? No      Video Start Time: 7:43 AM  Video-Visit Details    Type of service:  Video Visit    Video End Time:7:51 AM    Originating Location (pt. Location): Home    Distant Location (provider location):  [facility]     Platform used for Video Visit: Nidia     Kerwin Drummond is a patient from [facility] and is seen here for continuation of care for her metastatic ER+HER2- breast cancer.  She is a [employer] refugee from [facility] and was initially seen in clinic with her daughter.  The only data we have from [facility] is an English translation of her records.       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  On 02/11/2014, she was seen by an oncologist.  The clinical staging of T4b N2 M1 was noted.   Her breast cancer was on the left side. There was no right breast cancer, confirmed by the patient and her daughter, correcting a possible error in the translated records.  ER was positive in 100% of the cells, MI at 14% and HER2 was 3+ positive.  It appears that this histopathologic information is on the mastectomy specimen. She underwent an MRI for staging of presumptive bone metastases which was performed 03/02/2014.  There were skeletal metastases in the thoracic vertebrae at 12, L1, L3, L5 and S1 vertebral body, ranging in size from 0.7-3.0 cm in  size.  Ischial and right femur were also involved, as well as a large iliac mass measuring about 15 cm in the uterus. 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   Radiation Oncology consultation was performed 03/11/2014, and she was given radiation in 1 dose of 6 Gy to the right iliac lesion.        With the initial diagnosis, she initiated treatment with 6 cycles of CAF neoadjuvant chemotherapy 02/14, 07/07, 03/28, 04/18/14, 05/08 and 05/29/14. She also received monthly zoledronic acid.  She then underwent a modified left mastectomy of Palacios type and left lymphadenoectomy on 06/27/2014.   Pathologic examination showed number at [facility] was [id number] showed infiltrative grade 2 ductal cancer with 6 level 1 metastatic lymph nodes and 3 level 2 metastatic lymph nodes.  The differentiation was intermediate.  The tumor was ER positive 70% of the cells, AK positive in 40% of the cells and HER2 was 3+ by immunohistochemistry and the Ki-67 labeling index was 20%. Her staging after surgery was stage IV, pT4b N2 M1.  I don't see a biopsy of the skeletal metastases.  She then had continuation with chemotherapy with 4 cycles of Herceptin and taxane with monthly zoledronic acid.  Tamoxifen was initiated.  She then had two years of Herceptin and a decision was made not to continue further Herceptin.  She continued on zoledronic acid every 3 months. She was clinically stable. [place] then [place] the [place] new refugee from [facility].  She has now been changed to letrozole.  Denosumab has now been held for new diagnosis of osteonecrosis of the R maxilla.     History of cholecystectomy 2020.    Seen in the ED on 10/4/2021, for acute abdominal pain.  Elevated LFTs, elevated lipase.  Question of stone versus pancreatitis.  She was discharged and followed up with an outpatient gastroenterology clinic.  They recommended clear liquid diet and ERCP.        TREATMENT HISTORY:  A. Initial diagnosis with metastatic breast cancer in [facility].   Neoadjuvant CAF x 6.   B. Left mastectomy. Left axillary node dissection.  C.  Radiation in 1 dose to R iliac region.  C. Herceptin for 2 years taxane for a prescribed course then stopped, monthly zoledronic acid.  She had 2 years of Herceptin with tamoxifen added after chemotherapy.  D.  Tamoxifen alone and zoledronic acid every 3 months starting 2014.  Letrozole was started   E.  [place]  We restarted Herceptin every 3 weeks and continued tamoxifen. Bone targeted agent changed to denosumab every 9 weeks. Zometa discontinued 2017.  Tamoxifen was changed to letrozole August 2019.    F.  Xgeva discontinued 12-17-19 because of dental issues.   G.  J+J vaccine March, 2021.  H.  Was on Zometa once May 11.  Reaction with eye lid swelling. Discontinued Zometal  H.  Restart Xgeva 6-22-21.  Continuing the trastuzumab and letrozole.  Both tolerated well.       INTERVAL HISTORY Hoda is feeling well. She reports no concerns today. She continues on letrozole and trastuzumab and is tolerating treatment well. She is taking trazodone PRN for insomnia and this is working well.     She denies any fevers/chills, cough, SOB, CP, edema, anorexia, n/v/d/c, abdominal pain, new or different pain.        A 10 point review of systems is negative.     PHYSICAL EXAM:  There were no vitals taken for this visit.    Wt Readings from Last 4 Encounters:   12/17/21 79.4 kg (175 lb)   12/16/21 79.4 kg (175 lb)   12/09/21 81.2 kg (179 lb)   11/23/21 80.1 kg (176 lb 10.1 oz)     Video physical exam  General: Patient appears well in no acute distress.   Skin: No visualized rash or lesions on visualized skin  Eyes: EOMI, no erythema, sclera icterus or discharge noted  Resp: Appears to be breathing comfortably without accessory muscle usage, speaking in full sentences, no cough  MSK: Appears to have normal range of motion based on visualized movements  Neurologic: No apparent tremors, facial movements symmetric  Psych: affect bright, alert and  oriented    The rest of a comprehensive physical examination is deferred due to PHE (public health emergency) video restrictions      Labs  Most Recent 3 CBC's:  Recent Labs   Lab Test 12/16/21  1235 11/23/21  1256 11/02/21  1206   WBC 7.2 8.1 8.1   HGB 12.0 12.2 12.4   MCV 95 94 94    203 190    Most Recent 3 BMP's:  Recent Labs   Lab Test 12/16/21  1235 11/23/21  1256 11/02/21  1206    143 140   POTASSIUM 3.9 4.3 4.2   CHLORIDE 109 109 108   CO2 28 28 29   BUN 11 12 17   CR 0.76 0.69 0.66   ANIONGAP 7 6 3   TAYLER 8.6 8.7 9.2   GLC 98 80 83    Most Recent 2 LFT's:  Recent Labs   Lab Test 12/16/21  1235 11/23/21  1256   AST 51* 58*   ALT 63* 82*   ALKPHOS 54 51   BILITOTAL 0.3 0.3      I reviewed the above labs today.     Labs will be drawn later today        ASSESSMENT AND PLAN:     1.  Hoda Brush is a 66-year-old woman with a history of ER-positive, NV-positive, HER2 positive breast cancer.  She is from [facility], formally from [facility], and came to live in the [place] with [place].  The tumor is ER positive, NV positive and HER2 positive.  She had metastatic disease at the time of presentation with bone-only metastases by report.  She underwent neoadjuvant CAF, had a left mastectomy, left axillary lymph node dissection, radiation to the right hip, which included the right iliac region where she had metastatic disease.  She was initially on tamoxifen but then was switched to letrozole.  She continues on this and every 3-week trastuzumab.  She has had no evidence of disease progression for the last 3 years.  We have continued Herceptin every 3 weeks as well as daily letrozole.   -- Tumor markers have been stable, will be drawn later today   - CT CAP 12/13 showed stable disease   -- Proceed with herceptin today. Continue letrozole   -- Repeat CT CAP in March   -- Echo due March, scheduled     2.  Elevated LFTs, lipase, with abdominal pain- Resolved   -- Has no abdominal pain today, LFTs and  bili continue to improve   -- MRCP 11/4 normal other than hepatic steatosis     3. Bone health  - Xgeva was on hold due to possible ONJ. See Dr. Aguiar's note from 12/16--plan to resume today   - Continue Calcium and Vitamin D    4. Insomnia  - Continue trazodone 25mg PRN at bedtime.     15 minutes spent on the date of the encounter doing chart review, review of test results, interpretation of tests, patient visit and documentation     Anne Lea PA-C               Again, thank you for allowing me to participate in the care of your patient.        Sincerely,        Anne Lea PA-C

## 2022-01-27 ENCOUNTER — APPOINTMENT (OUTPATIENT)
Dept: LAB | Facility: CLINIC | Age: 67
End: 2022-01-27
Attending: PHYSICIAN ASSISTANT
Payer: COMMERCIAL

## 2022-01-27 ENCOUNTER — INFUSION THERAPY VISIT (OUTPATIENT)
Dept: ONCOLOGY | Facility: CLINIC | Age: 67
End: 2022-01-27
Attending: PHYSICIAN ASSISTANT
Payer: COMMERCIAL

## 2022-01-27 VITALS
SYSTOLIC BLOOD PRESSURE: 114 MMHG | DIASTOLIC BLOOD PRESSURE: 74 MMHG | HEART RATE: 81 BPM | RESPIRATION RATE: 16 BRPM | OXYGEN SATURATION: 98 % | BODY MASS INDEX: 31.71 KG/M2 | WEIGHT: 177.6 LBS | TEMPERATURE: 98 F

## 2022-01-27 DIAGNOSIS — C50.912 MALIGNANT NEOPLASM OF LEFT FEMALE BREAST, UNSPECIFIED ESTROGEN RECEPTOR STATUS, UNSPECIFIED SITE OF BREAST (H): ICD-10-CM

## 2022-01-27 DIAGNOSIS — C79.51 BONE METASTASIS: ICD-10-CM

## 2022-01-27 DIAGNOSIS — C50.919 METASTATIC BREAST CANCER: Primary | ICD-10-CM

## 2022-01-27 DIAGNOSIS — C50.912 MALIGNANT NEOPLASM OF LEFT FEMALE BREAST, UNSPECIFIED ESTROGEN RECEPTOR STATUS, UNSPECIFIED SITE OF BREAST (H): Primary | ICD-10-CM

## 2022-01-27 LAB
ALBUMIN SERPL-MCNC: 3.4 G/DL (ref 3.4–5)
ALP SERPL-CCNC: 52 U/L (ref 40–150)
ALT SERPL W P-5'-P-CCNC: 45 U/L (ref 0–50)
ANION GAP SERPL CALCULATED.3IONS-SCNC: 6 MMOL/L (ref 3–14)
AST SERPL W P-5'-P-CCNC: 34 U/L (ref 0–45)
BASOPHILS # BLD AUTO: 0.1 10E3/UL (ref 0–0.2)
BASOPHILS NFR BLD AUTO: 1 %
BILIRUB SERPL-MCNC: 0.2 MG/DL (ref 0.2–1.3)
BUN SERPL-MCNC: 14 MG/DL (ref 7–30)
CALCIUM SERPL-MCNC: 9.6 MG/DL (ref 8.5–10.1)
CANCER AG27-29 SERPL-ACNC: 8 U/ML (ref 0–39)
CEA SERPL-MCNC: <0.5 UG/L (ref 0–2.5)
CHLORIDE BLD-SCNC: 106 MMOL/L (ref 94–109)
CO2 SERPL-SCNC: 29 MMOL/L (ref 20–32)
CREAT SERPL-MCNC: 0.78 MG/DL (ref 0.52–1.04)
EOSINOPHIL # BLD AUTO: 0.3 10E3/UL (ref 0–0.7)
EOSINOPHIL NFR BLD AUTO: 3 %
ERYTHROCYTE [DISTWIDTH] IN BLOOD BY AUTOMATED COUNT: 13.3 % (ref 10–15)
GFR SERPL CREATININE-BSD FRML MDRD: 84 ML/MIN/1.73M2
GLUCOSE BLD-MCNC: 77 MG/DL (ref 70–99)
HCT VFR BLD AUTO: 39.2 % (ref 35–47)
HGB BLD-MCNC: 12.3 G/DL (ref 11.7–15.7)
IMM GRANULOCYTES # BLD: 0 10E3/UL
IMM GRANULOCYTES NFR BLD: 0 %
LYMPHOCYTES # BLD AUTO: 3 10E3/UL (ref 0.8–5.3)
LYMPHOCYTES NFR BLD AUTO: 37 %
MCH RBC QN AUTO: 29.8 PG (ref 26.5–33)
MCHC RBC AUTO-ENTMCNC: 31.4 G/DL (ref 31.5–36.5)
MCV RBC AUTO: 95 FL (ref 78–100)
MONOCYTES # BLD AUTO: 0.6 10E3/UL (ref 0–1.3)
MONOCYTES NFR BLD AUTO: 7 %
NEUTROPHILS # BLD AUTO: 4.2 10E3/UL (ref 1.6–8.3)
NEUTROPHILS NFR BLD AUTO: 52 %
NRBC # BLD AUTO: 0 10E3/UL
NRBC BLD AUTO-RTO: 0 /100
PLATELET # BLD AUTO: 218 10E3/UL (ref 150–450)
POTASSIUM BLD-SCNC: 4.1 MMOL/L (ref 3.4–5.3)
PROT SERPL-MCNC: 7.6 G/DL (ref 6.8–8.8)
RBC # BLD AUTO: 4.13 10E6/UL (ref 3.8–5.2)
SODIUM SERPL-SCNC: 141 MMOL/L (ref 133–144)
WBC # BLD AUTO: 8.1 10E3/UL (ref 4–11)

## 2022-01-27 PROCEDURE — 82378 CARCINOEMBRYONIC ANTIGEN: CPT

## 2022-01-27 PROCEDURE — G0463 HOSPITAL OUTPT CLINIC VISIT: HCPCS | Mod: PN,RTG | Performed by: PHYSICIAN ASSISTANT

## 2022-01-27 PROCEDURE — 250N000011 HC RX IP 250 OP 636: Performed by: PHYSICIAN ASSISTANT

## 2022-01-27 PROCEDURE — 85025 COMPLETE CBC W/AUTO DIFF WBC: CPT

## 2022-01-27 PROCEDURE — 96413 CHEMO IV INFUSION 1 HR: CPT

## 2022-01-27 PROCEDURE — 86300 IMMUNOASSAY TUMOR CA 15-3: CPT

## 2022-01-27 PROCEDURE — 80053 COMPREHEN METABOLIC PANEL: CPT

## 2022-01-27 PROCEDURE — 99214 OFFICE O/P EST MOD 30 MIN: CPT | Mod: 95 | Performed by: PHYSICIAN ASSISTANT

## 2022-01-27 PROCEDURE — 36591 DRAW BLOOD OFF VENOUS DEVICE: CPT

## 2022-01-27 PROCEDURE — 258N000003 HC RX IP 258 OP 636: Performed by: PHYSICIAN ASSISTANT

## 2022-01-27 RX ORDER — METHYLPREDNISOLONE SODIUM SUCCINATE 125 MG/2ML
125 INJECTION, POWDER, LYOPHILIZED, FOR SOLUTION INTRAMUSCULAR; INTRAVENOUS
Status: CANCELLED
Start: 2022-02-17

## 2022-01-27 RX ORDER — METHYLPREDNISOLONE SODIUM SUCCINATE 125 MG/2ML
125 INJECTION, POWDER, LYOPHILIZED, FOR SOLUTION INTRAMUSCULAR; INTRAVENOUS
Status: CANCELLED
Start: 2022-01-27

## 2022-01-27 RX ORDER — ALBUTEROL SULFATE 0.83 MG/ML
2.5 SOLUTION RESPIRATORY (INHALATION)
Status: CANCELLED | OUTPATIENT
Start: 2022-01-27

## 2022-01-27 RX ORDER — ACETAMINOPHEN 325 MG/1
650 TABLET ORAL
Status: CANCELLED | OUTPATIENT
Start: 2022-01-27

## 2022-01-27 RX ORDER — LORAZEPAM 2 MG/ML
0.5 INJECTION INTRAMUSCULAR EVERY 4 HOURS PRN
Status: CANCELLED
Start: 2022-02-17

## 2022-01-27 RX ORDER — MEPERIDINE HYDROCHLORIDE 25 MG/ML
25 INJECTION INTRAMUSCULAR; INTRAVENOUS; SUBCUTANEOUS EVERY 30 MIN PRN
Status: CANCELLED | OUTPATIENT
Start: 2022-01-27

## 2022-01-27 RX ORDER — EPINEPHRINE 0.3 MG/.3ML
0.3 INJECTION SUBCUTANEOUS EVERY 5 MIN PRN
Status: CANCELLED | OUTPATIENT
Start: 2022-01-27

## 2022-01-27 RX ORDER — DIPHENHYDRAMINE HCL 25 MG
50 CAPSULE ORAL ONCE
Status: CANCELLED
Start: 2022-02-17

## 2022-01-27 RX ORDER — EPINEPHRINE 1 MG/ML
0.3 INJECTION, SOLUTION INTRAMUSCULAR; SUBCUTANEOUS EVERY 5 MIN PRN
Status: CANCELLED | OUTPATIENT
Start: 2022-01-27

## 2022-01-27 RX ORDER — ALBUTEROL SULFATE 90 UG/1
1-2 AEROSOL, METERED RESPIRATORY (INHALATION)
Status: CANCELLED
Start: 2022-02-17

## 2022-01-27 RX ORDER — SODIUM CHLORIDE 9 MG/ML
1000 INJECTION, SOLUTION INTRAVENOUS CONTINUOUS PRN
Status: CANCELLED
Start: 2022-02-17

## 2022-01-27 RX ORDER — DIPHENHYDRAMINE HCL 25 MG
50 CAPSULE ORAL ONCE
Status: CANCELLED
Start: 2022-01-27

## 2022-01-27 RX ORDER — ALBUTEROL SULFATE 0.83 MG/ML
2.5 SOLUTION RESPIRATORY (INHALATION)
Status: CANCELLED | OUTPATIENT
Start: 2022-02-17

## 2022-01-27 RX ORDER — DIPHENHYDRAMINE HYDROCHLORIDE 50 MG/ML
50 INJECTION INTRAMUSCULAR; INTRAVENOUS
Status: CANCELLED
Start: 2022-02-17

## 2022-01-27 RX ORDER — HEPARIN SODIUM (PORCINE) LOCK FLUSH IV SOLN 100 UNIT/ML 100 UNIT/ML
5 SOLUTION INTRAVENOUS ONCE
Status: COMPLETED | OUTPATIENT
Start: 2022-01-27 | End: 2022-01-27

## 2022-01-27 RX ORDER — SODIUM CHLORIDE 9 MG/ML
1000 INJECTION, SOLUTION INTRAVENOUS CONTINUOUS PRN
Status: CANCELLED
Start: 2022-01-27

## 2022-01-27 RX ORDER — DIPHENHYDRAMINE HYDROCHLORIDE 50 MG/ML
50 INJECTION INTRAMUSCULAR; INTRAVENOUS
Status: CANCELLED
Start: 2022-01-27

## 2022-01-27 RX ORDER — LORAZEPAM 2 MG/ML
0.5 INJECTION INTRAMUSCULAR EVERY 4 HOURS PRN
Status: CANCELLED
Start: 2022-01-27

## 2022-01-27 RX ORDER — EPINEPHRINE 1 MG/ML
0.3 INJECTION, SOLUTION INTRAMUSCULAR; SUBCUTANEOUS EVERY 5 MIN PRN
Status: CANCELLED | OUTPATIENT
Start: 2022-02-17

## 2022-01-27 RX ORDER — ALBUTEROL SULFATE 90 UG/1
1-2 AEROSOL, METERED RESPIRATORY (INHALATION)
Status: CANCELLED
Start: 2022-01-27

## 2022-01-27 RX ORDER — HEPARIN SODIUM (PORCINE) LOCK FLUSH IV SOLN 100 UNIT/ML 100 UNIT/ML
5 SOLUTION INTRAVENOUS EVERY 8 HOURS
Status: CANCELLED | OUTPATIENT
Start: 2022-02-17

## 2022-01-27 RX ORDER — MEPERIDINE HYDROCHLORIDE 25 MG/ML
25 INJECTION INTRAMUSCULAR; INTRAVENOUS; SUBCUTANEOUS EVERY 30 MIN PRN
Status: CANCELLED | OUTPATIENT
Start: 2022-02-17

## 2022-01-27 RX ORDER — HEPARIN SODIUM (PORCINE) LOCK FLUSH IV SOLN 100 UNIT/ML 100 UNIT/ML
5 SOLUTION INTRAVENOUS EVERY 8 HOURS
Status: DISCONTINUED | OUTPATIENT
Start: 2022-01-27 | End: 2022-01-27 | Stop reason: HOSPADM

## 2022-01-27 RX ORDER — HEPARIN SODIUM (PORCINE) LOCK FLUSH IV SOLN 100 UNIT/ML 100 UNIT/ML
5 SOLUTION INTRAVENOUS EVERY 8 HOURS
Status: CANCELLED | OUTPATIENT
Start: 2022-01-27

## 2022-01-27 RX ORDER — ACETAMINOPHEN 325 MG/1
650 TABLET ORAL
Status: CANCELLED | OUTPATIENT
Start: 2022-02-17

## 2022-01-27 RX ORDER — EPINEPHRINE 0.3 MG/.3ML
0.3 INJECTION SUBCUTANEOUS EVERY 5 MIN PRN
Status: CANCELLED | OUTPATIENT
Start: 2022-02-17

## 2022-01-27 RX ADMIN — SODIUM CHLORIDE, PRESERVATIVE FREE 5 ML: 5 INJECTION INTRAVENOUS at 12:55

## 2022-01-27 RX ADMIN — SODIUM CHLORIDE 250 ML: 9 INJECTION, SOLUTION INTRAVENOUS at 13:54

## 2022-01-27 RX ADMIN — TRASTUZUMAB 450 MG: 150 INJECTION, POWDER, LYOPHILIZED, FOR SOLUTION INTRAVENOUS at 13:54

## 2022-01-27 RX ADMIN — Medication 5 ML: at 14:25

## 2022-01-27 ASSESSMENT — PAIN SCALES - GENERAL: PAINLEVEL: NO PAIN (0)

## 2022-01-27 NOTE — LETTER
1/27/2022         RE: Hoda Brush  [address]  [facility] [address]        Dear Colleague,    Thank you for referring your patient, Hoda Brush, to the [facility]. Please see a copy of my visit note below.    Hoda is a 66 year old who is being evaluated via a billable video visit.      How would you like to obtain your AVS? MyChart  If the video visit is dropped, the invitation should be resent by: Send to e-mail at: [email or web address]  Will anyone else be joining your video visit? No      Video Start Time: 8:29 AM  Video-Visit Details    Type of service:  Video Visit    Video End Time:8:37 AM    Originating Location (pt. Location): Home    Distant Location (provider location):  [facility]     Platform used for Video Visit: Nidia    Kerwin Drummond is a patient from [facility] and is seen here for continuation of care for her metastatic ER+HER2- breast cancer.  She is a [employer] refugee from [facility] and was initially seen in clinic with her daughter.  The only data we have from [facility] is an English translation of her records.       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  On 02/11/2014, she was seen by an oncologist.  The clinical staging of T4b N2 M1 was noted.   Her breast cancer was on the left side. There was no right breast cancer, confirmed by the patient and her daughter, correcting a possible error in the translated records.  ER was positive in 100% of the cells, CO at 14% and HER2 was 3+ positive.  It appears that this histopathologic information is on the mastectomy specimen. She underwent an MRI for staging of presumptive bone metastases which was performed 03/02/2014.  There were skeletal metastases in the thoracic vertebrae at 12, L1, L3, L5 and S1 vertebral body, ranging in size from 0.7-3.0 cm in  size.  Ischial and right femur were also involved, as well as a large iliac mass measuring about 15 cm in the uterus. 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   Radiation Oncology consultation was performed 03/11/2014, and she was given radiation in 1 dose of 6 Gy to the right iliac lesion.        With the initial diagnosis, she initiated treatment with 6 cycles of CAF neoadjuvant chemotherapy 02/14, 07/07, 03/28, 04/18/14, 05/08 and 05/29/14. She also received monthly zoledronic acid.  She then underwent a modified left mastectomy of Palacios type and left lymphadenoectomy on 06/27/2014.   Pathologic examination showed number at [facility] was [id number] showed infiltrative grade 2 ductal cancer with 6 level 1 metastatic lymph nodes and 3 level 2 metastatic lymph nodes.  The differentiation was intermediate.  The tumor was ER positive 70% of the cells, MO positive in 40% of the cells and HER2 was 3+ by immunohistochemistry and the Ki-67 labeling index was 20%. Her staging after surgery was stage IV, pT4b N2 M1.  I don't see a biopsy of the skeletal metastases.  She then had continuation with chemotherapy with 4 cycles of Herceptin and taxane with monthly zoledronic acid.  Tamoxifen was initiated.  She then had two years of Herceptin and a decision was made not to continue further Herceptin.  She continued on zoledronic acid every 3 months. She was clinically stable. [place] the [place] new [place] from [facility] [place].  She has now been changed to letrozole.  Denosumab has now been held for new diagnosis of osteonecrosis of the R maxilla.     History of cholecystectomy 2020.    Seen in the ED on 10/4/2021, for acute abdominal pain.  Elevated LFTs, elevated lipase.  Question of stone versus pancreatitis.  She was discharged and followed up with an outpatient gastroenterology clinic.  They recommended clear liquid diet and ERCP.        TREATMENT HISTORY:  A. Initial diagnosis with metastatic breast cancer in [facility].   Neoadjuvant CAF x 6.   B. Left mastectomy. Left axillary node dissection.  C.  Radiation in 1 dose to R iliac region.  C. Herceptin for 2 years taxane for a prescribed course then stopped, monthly zoledronic acid.  She had 2 years of Herceptin with tamoxifen added after chemotherapy.  D.  Tamoxifen alone and zoledronic acid every 3 months starting 2014.  Letrozole was started   E.  [place]  We restarted Herceptin every 3 weeks and continued tamoxifen. Bone targeted agent changed to denosumab every 9 weeks. Zometa discontinued 2017.  Tamoxifen was changed to letrozole August 2019.    F.  Xgeva discontinued 12-17-19 because of dental issues.   G.  J+J vaccine March, 2021.  H.  Was on Zometa once May 11.  Reaction with eye lid swelling. Discontinued Zometal  H.  Restart Xgeva 6-22-21.  Continuing the trastuzumab and letrozole.  Both tolerated well.       INTERVAL HISTORY Hoda is seen via video with the assistance of a Vietnamese  on the phone. She continues to feel well at this time. She has no concerns. She continues to tolerate treatment well. No new or different pain. No SOB, edema, or CP. She is eating and drinking well. No nausea or bowel issues. No fevers/chills or recent infectious concerns.       PHYSICAL EXAM:  There were no vitals taken for this visit.    Wt Readings from Last 4 Encounters:   01/06/22 80.3 kg (177 lb)   12/17/21 79.4 kg (175 lb)   12/16/21 79.4 kg (175 lb)   12/09/21 81.2 kg (179 lb)     Video physical exam  General: Patient appears well in no acute distress.   Skin: No visualized rash or lesions on visualized skin  Eyes: EOMI, no erythema, sclera icterus or discharge noted  Resp: Appears to be breathing comfortably without accessory muscle usage, speaking in full sentences, no cough  MSK: Appears to have normal range of motion based on visualized movements  Neurologic: No apparent tremors, facial movements symmetric  Psych: affect bright, alert and oriented    The rest of a  comprehensive physical examination is deferred due to PHE (public health emergency) video restrictions      Labs  Most Recent 3 CBC's:  Recent Labs   Lab Test 01/06/22  1255 12/16/21  1235 11/23/21  1256   WBC 8.3 7.2 8.1   HGB 12.1 12.0 12.2   MCV 96 95 94    244 203    Most Recent 3 BMP's:  Recent Labs   Lab Test 01/06/22  1255 12/16/21  1235 11/23/21  1256    144 143   POTASSIUM 4.0 3.9 4.3   CHLORIDE 108 109 109   CO2 28 28 28   BUN 14 11 12   CR 0.75 0.76 0.69   ANIONGAP 6 7 6   TAYLER 8.9 8.6 8.7   GLC 69* 98 80    Most Recent 2 LFT's:  Recent Labs   Lab Test 01/06/22  1255 12/16/21  1235   AST 36 51*   ALT 55* 63*   ALKPHOS 50 54   BILITOTAL 0.3 0.3      I reviewed the above labs today.     Labs will be drawn later today        ASSESSMENT AND PLAN:     1.  Hoda Brush is a 66-year-old woman with a history of ER-positive, MN-positive, HER2 positive breast cancer.  She is from [facility], formally from [facility], and came to live in the [place] with [place].  The tumor is ER positive, MN positive and HER2 positive.  She had metastatic disease at the time of presentation with bone-only metastases by report.  She underwent neoadjuvant CAF, had a left mastectomy, left axillary lymph node dissection, radiation to the right hip, which included the right iliac region where she had metastatic disease.  She was initially on tamoxifen but then was switched to letrozole.  She continues on this and every 3-week trastuzumab.  She has had no evidence of disease progression for the last 3 years.  We have continued Herceptin every 3 weeks as well as daily letrozole.   -- Tumor markers have been stable, will be drawn later today   - CT CAP 12/13 showed stable disease   -- Proceed with herceptin today. Continue letrozole   -- Repeat CT CAP in March   -- Echo due March, scheduled     2.  Elevated LFTs, lipase, with abdominal pain- Resolved   -- Has no abdominal pain today, LFTs and bili continue to improve    -- MRCP 11/4 normal other than hepatic steatosis     3. Bone health  - Xgeva was on hold due to possible ONJ. See Dr. Aguiar's note from 12/16--this was resumed on 1/6.   - Continue Calcium and Vitamin D    4. Insomnia  - Continue trazodone 25mg PRN at bedtime.     15 minutes spent on the date of the encounter doing chart review, review of test results, interpretation of tests, patient visit and documentation     Anne Lea PA-C           Again, thank you for allowing me to participate in the care of your patient.        Sincerely,        Anne Lea PA-C

## 2022-01-27 NOTE — NURSING NOTE
Chief Complaint   Patient presents with     Blood Draw     Labs obtained via noncoring powerport 20 gauge, 3/4 inch needle, heparin flushed, VS taken, checked into next appt

## 2022-01-27 NOTE — PROGRESS NOTES
Infusion Nursing Note:  Hoda Brush presents today for Cycle 76 Herceptin.    Patient had a virtual visit with Anne Lea prior to infusion.        Intravenous Access:  Implanted Port.    Treatment Conditions:  ECHO/MUGA completed 11/30/21 EF 55-60%.  Component      Latest Ref Rng & Units 1/27/2022   WBC      4.0 - 11.0 10e3/uL 8.1   RBC Count      3.80 - 5.20 10e6/uL 4.13   Hemoglobin      11.7 - 15.7 g/dL 12.3   Hematocrit      35.0 - 47.0 % 39.2   MCV      78 - 100 fL 95   MCH      26.5 - 33.0 pg 29.8   MCHC      31.5 - 36.5 g/dL 31.4 (L)   RDW      10.0 - 15.0 % 13.3   Platelet Count      150 - 450 10e3/uL 218   % Neutrophils      % 52   % Lymphocytes      % 37   % Monocytes      % 7   % Eosinophils      % 3   % Basophils      % 1   % Immature Granulocytes      % 0   NRBCs per 100 WBC      <1 /100 0   Absolute Neutrophils      1.6 - 8.3 10e3/uL 4.2   Absolute Lymphocytes      0.8 - 5.3 10e3/uL 3.0   Absolute Monocytes      0.0 - 1.3 10e3/uL 0.6   Absolute Eosinophils      0.0 - 0.7 10e3/uL 0.3   Absolute Basophils      0.0 - 0.2 10e3/uL 0.1   Absolute Immature Granulocytes      <=0.4 10e3/uL 0.0   Absolute NRBCs      10e3/uL 0.0   Sodium      133 - 144 mmol/L 141   Potassium      3.4 - 5.3 mmol/L 4.1   Chloride      94 - 109 mmol/L 106   Carbon Dioxide      20 - 32 mmol/L 29   Anion Gap      3 - 14 mmol/L 6   Urea Nitrogen      7 - 30 mg/dL 14   Creatinine      0.52 - 1.04 mg/dL 0.78   Calcium      8.5 - 10.1 mg/dL 9.6   Glucose      70 - 99 mg/dL 77   Alkaline Phosphatase      40 - 150 U/L 52   AST      0 - 45 U/L 34   ALT      0 - 50 U/L 45   Protein Total      6.8 - 8.8 g/dL 7.6   Albumin      3.4 - 5.0 g/dL 3.4   Bilirubin Total      0.2 - 1.3 mg/dL 0.2   GFR Estimate      >60 mL/min/1.73m2 84     Results reviewed, labs MET treatment parameters, ok to proceed with treatment.      Post Infusion Assessment:  Patient tolerated infusion without incident.       Discharge Plan:   Patient declined  prescription refills.  AVS to patient via [employer].  Patient will return 2/17/22 for cycle 76    Odalis Moreno RN

## 2022-01-27 NOTE — PROGRESS NOTES
Hoda is a 66 year old who is being evaluated via a billable video visit.      How would you like to obtain your AVS? MyChart  If the video visit is dropped, the invitation should be resent by: Send to e-mail at: [email or web address]  Will anyone else be joining your video visit? No      Video Start Time: 8:29 AM  Video-Visit Details    Type of service:  Video Visit    Video End Time:8:37 AM    Originating Location (pt. Location): Home    Distant Location (provider location):  [facility]     Platform used for Video Visit: Nidia    Kerwin Drummond is a patient from [facility] and is seen here for continuation of care for her metastatic ER+HER2- breast cancer.  She is a [employer] refugee from [facility] and was initially seen in clinic with her daughter.  The only data we have from [facility] is an English translation of her records.       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  On 02/11/2014, she was seen by an oncologist.  The clinical staging of T4b N2 M1 was noted.   Her breast cancer was on the left side. There was no right breast cancer, confirmed by the patient and her daughter, correcting a possible error in the translated records.  ER was positive in 100% of the cells, MI at 14% and HER2 was 3+ positive.  It appears that this histopathologic information is on the mastectomy specimen. She underwent an MRI for staging of presumptive bone metastases which was performed 03/02/2014.  There were skeletal metastases in the thoracic vertebrae at 12, L1, L3, L5 and S1 vertebral body, ranging in size from 0.7-3.0 cm in size.  Ischial and right femur were also involved, as well as a large iliac mass measuring about 15 cm in the uterus. 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   Radiation Oncology consultation was performed 03/11/2014, and she was given radiation in 1 dose of 6 Gy to the right iliac lesion.         With the initial diagnosis, she initiated treatment with 6 cycles of CAF neoadjuvant chemotherapy 02/14, 07/07, 03/28, 04/18/14, 05/08 and 05/29/14. She also received monthly zoledronic acid.  She then underwent a modified left mastectomy of Palacios type and left lymphadenoectomy on 06/27/2014.   Pathologic examination showed number at [facility] was [id number] showed infiltrative grade 2 ductal cancer with 6 level 1 metastatic lymph nodes and 3 level 2 metastatic lymph nodes.  The differentiation was intermediate.  The tumor was ER positive 70% of the cells, DE positive in 40% of the cells and HER2 was 3+ by immunohistochemistry and the Ki-67 labeling index was 20%. Her staging after surgery was stage IV, pT4b N2 M1.  I don't see a biopsy of the skeletal metastases.  She then had continuation with chemotherapy with 4 cycles of Herceptin and taxane with monthly zoledronic acid.  Tamoxifen was initiated.  She then had two years of Herceptin and a decision was made not to continue further Herceptin.  She continued on zoledronic acid every 3 months. She was clinically stable. [place] then [place] the [place] new [place] from [facility].  She has now been changed to letrozole.  Denosumab has now been held for new diagnosis of osteonecrosis of the R maxilla.     History of cholecystectomy 2020.    Seen in the ED on 10/4/2021, for acute abdominal pain.  Elevated LFTs, elevated lipase.  Question of stone versus pancreatitis.  She was discharged and followed up with an outpatient gastroenterology clinic.  They recommended clear liquid diet and ERCP.        TREATMENT HISTORY:  A. Initial diagnosis with metastatic breast cancer in [facility].  Neoadjuvant CAF x 6.   B. Left mastectomy. Left axillary node dissection.  C.  Radiation in 1 dose to R iliac region.  C. Herceptin for 2 years taxane for a prescribed course then stopped, monthly zoledronic acid.  She had 2 years of Herceptin with tamoxifen added after  chemotherapy.  D.  Tamoxifen alone and zoledronic acid every 3 months starting 2014.  Letrozole was started   E.  [place]  We restarted Herceptin every 3 weeks and continued tamoxifen. Bone targeted agent changed to denosumab every 9 weeks. Zometa discontinued 2017.  Tamoxifen was changed to letrozole August 2019.    F.  Xgeva discontinued 12-17-19 because of dental issues.   G.  J+J vaccine March, 2021.  H.  Was on Zometa once May 11.  Reaction with eye lid swelling. Discontinued Zometal  H.  Restart Xgeva 6-22-21.  Continuing the trastuzumab and letrozole.  Both tolerated well.       INTERVAL HISTORY Hoda is seen via video with the assistance of a Burundian  on the phone. She continues to feel well at this time. She has no concerns. She continues to tolerate treatment well. No new or different pain. No SOB, edema, or CP. She is eating and drinking well. No nausea or bowel issues. No fevers/chills or recent infectious concerns.       PHYSICAL EXAM:  There were no vitals taken for this visit.    Wt Readings from Last 4 Encounters:   01/06/22 80.3 kg (177 lb)   12/17/21 79.4 kg (175 lb)   12/16/21 79.4 kg (175 lb)   12/09/21 81.2 kg (179 lb)     Video physical exam  General: Patient appears well in no acute distress.   Skin: No visualized rash or lesions on visualized skin  Eyes: EOMI, no erythema, sclera icterus or discharge noted  Resp: Appears to be breathing comfortably without accessory muscle usage, speaking in full sentences, no cough  MSK: Appears to have normal range of motion based on visualized movements  Neurologic: No apparent tremors, facial movements symmetric  Psych: affect bright, alert and oriented    The rest of a comprehensive physical examination is deferred due to PHE (public health emergency) video restrictions      Labs  Most Recent 3 CBC's:  Recent Labs   Lab Test 01/06/22  1255 12/16/21  1235 11/23/21  1256   WBC 8.3 7.2 8.1   HGB 12.1 12.0 12.2   MCV 96 95 94    244  203    Most Recent 3 BMP's:  Recent Labs   Lab Test 01/06/22  1255 12/16/21  1235 11/23/21  1256    144 143   POTASSIUM 4.0 3.9 4.3   CHLORIDE 108 109 109   CO2 28 28 28   BUN 14 11 12   CR 0.75 0.76 0.69   ANIONGAP 6 7 6   TAYLER 8.9 8.6 8.7   GLC 69* 98 80    Most Recent 2 LFT's:  Recent Labs   Lab Test 01/06/22  1255 12/16/21  1235   AST 36 51*   ALT 55* 63*   ALKPHOS 50 54   BILITOTAL 0.3 0.3      I reviewed the above labs today.     Labs will be drawn later today        ASSESSMENT AND PLAN:     1.  Hoda Brush is a 66-year-old woman with a history of ER-positive, TX-positive, HER2 positive breast cancer.  She is from [facility], formally from [facility], and came to live in the [place] with [place].  The tumor is ER positive, TX positive and HER2 positive.  She had metastatic disease at the time of presentation with bone-only metastases by report.  She underwent neoadjuvant CAF, had a left mastectomy, left axillary lymph node dissection, radiation to the right hip, which included the right iliac region where she had metastatic disease.  She was initially on tamoxifen but then was switched to letrozole.  She continues on this and every 3-week trastuzumab.  She has had no evidence of disease progression for the last 3 years.  We have continued Herceptin every 3 weeks as well as daily letrozole.   -- Tumor markers have been stable, will be drawn later today   - CT CAP 12/13 showed stable disease   -- Proceed with herceptin today. Continue letrozole   -- Repeat CT CAP in March   -- Echo due March, scheduled     2.  Elevated LFTs, lipase, with abdominal pain- Resolved   -- Has no abdominal pain today, LFTs and bili continue to improve   -- MRCP 11/4 normal other than hepatic steatosis     3. Bone health  - Xgeva was on hold due to possible ONJ. See Dr. Aguiar's note from 12/16--this was resumed on 1/6.   - Continue Calcium and Vitamin D    4. Insomnia  - Continue trazodone 25mg PRN at bedtime.     15  minutes spent on the date of the encounter doing chart review, review of test results, interpretation of tests, patient visit and documentation     Anne Lea PA-C

## 2022-02-17 ENCOUNTER — INFUSION THERAPY VISIT (OUTPATIENT)
Dept: ONCOLOGY | Facility: CLINIC | Age: 67
End: 2022-02-17
Attending: INTERNAL MEDICINE
Payer: COMMERCIAL

## 2022-02-17 ENCOUNTER — APPOINTMENT (OUTPATIENT)
Dept: LAB | Facility: CLINIC | Age: 67
End: 2022-02-17
Attending: INTERNAL MEDICINE
Payer: COMMERCIAL

## 2022-02-17 VITALS
HEART RATE: 77 BPM | RESPIRATION RATE: 16 BRPM | OXYGEN SATURATION: 91 % | DIASTOLIC BLOOD PRESSURE: 67 MMHG | WEIGHT: 178.9 LBS | BODY MASS INDEX: 31.94 KG/M2 | SYSTOLIC BLOOD PRESSURE: 109 MMHG | TEMPERATURE: 98.7 F

## 2022-02-17 DIAGNOSIS — C50.912 MALIGNANT NEOPLASM OF LEFT FEMALE BREAST, UNSPECIFIED ESTROGEN RECEPTOR STATUS, UNSPECIFIED SITE OF BREAST (H): Primary | ICD-10-CM

## 2022-02-17 DIAGNOSIS — C79.51 BONE METASTASIS: ICD-10-CM

## 2022-02-17 LAB
ALBUMIN SERPL-MCNC: 3.2 G/DL (ref 3.4–5)
ALP SERPL-CCNC: 50 U/L (ref 40–150)
ALT SERPL W P-5'-P-CCNC: 48 U/L (ref 0–50)
ANION GAP SERPL CALCULATED.3IONS-SCNC: 5 MMOL/L (ref 3–14)
AST SERPL W P-5'-P-CCNC: 38 U/L (ref 0–45)
BASOPHILS # BLD AUTO: 0 10E3/UL (ref 0–0.2)
BASOPHILS NFR BLD AUTO: 0 %
BILIRUB SERPL-MCNC: 0.2 MG/DL (ref 0.2–1.3)
BUN SERPL-MCNC: 16 MG/DL (ref 7–30)
CALCIUM SERPL-MCNC: 8.8 MG/DL (ref 8.5–10.1)
CANCER AG27-29 SERPL-ACNC: 6 U/ML (ref 0–39)
CEA SERPL-MCNC: 0.8 UG/L (ref 0–2.5)
CHLORIDE BLD-SCNC: 108 MMOL/L (ref 94–109)
CO2 SERPL-SCNC: 29 MMOL/L (ref 20–32)
CREAT SERPL-MCNC: 0.75 MG/DL (ref 0.52–1.04)
EOSINOPHIL # BLD AUTO: 0.2 10E3/UL (ref 0–0.7)
EOSINOPHIL NFR BLD AUTO: 3 %
ERYTHROCYTE [DISTWIDTH] IN BLOOD BY AUTOMATED COUNT: 13.2 % (ref 10–15)
GFR SERPL CREATININE-BSD FRML MDRD: 88 ML/MIN/1.73M2
GLUCOSE BLD-MCNC: 87 MG/DL (ref 70–99)
HCT VFR BLD AUTO: 38 % (ref 35–47)
HGB BLD-MCNC: 11.9 G/DL (ref 11.7–15.7)
IMM GRANULOCYTES # BLD: 0 10E3/UL
IMM GRANULOCYTES NFR BLD: 0 %
LYMPHOCYTES # BLD AUTO: 2.5 10E3/UL (ref 0.8–5.3)
LYMPHOCYTES NFR BLD AUTO: 34 %
MCH RBC QN AUTO: 29.2 PG (ref 26.5–33)
MCHC RBC AUTO-ENTMCNC: 31.3 G/DL (ref 31.5–36.5)
MCV RBC AUTO: 93 FL (ref 78–100)
MONOCYTES # BLD AUTO: 0.5 10E3/UL (ref 0–1.3)
MONOCYTES NFR BLD AUTO: 7 %
NEUTROPHILS # BLD AUTO: 4.1 10E3/UL (ref 1.6–8.3)
NEUTROPHILS NFR BLD AUTO: 56 %
NRBC # BLD AUTO: 0 10E3/UL
NRBC BLD AUTO-RTO: 0 /100
PLATELET # BLD AUTO: 216 10E3/UL (ref 150–450)
POTASSIUM BLD-SCNC: 4 MMOL/L (ref 3.4–5.3)
PROT SERPL-MCNC: 7.5 G/DL (ref 6.8–8.8)
RBC # BLD AUTO: 4.08 10E6/UL (ref 3.8–5.2)
SODIUM SERPL-SCNC: 142 MMOL/L (ref 133–144)
WBC # BLD AUTO: 7.4 10E3/UL (ref 4–11)

## 2022-02-17 PROCEDURE — 96372 THER/PROPH/DIAG INJ SC/IM: CPT | Performed by: PHYSICIAN ASSISTANT

## 2022-02-17 PROCEDURE — 96413 CHEMO IV INFUSION 1 HR: CPT

## 2022-02-17 PROCEDURE — 258N000003 HC RX IP 258 OP 636: Performed by: PHYSICIAN ASSISTANT

## 2022-02-17 PROCEDURE — 36591 DRAW BLOOD OFF VENOUS DEVICE: CPT | Performed by: PHYSICIAN ASSISTANT

## 2022-02-17 PROCEDURE — 86300 IMMUNOASSAY TUMOR CA 15-3: CPT | Performed by: PHYSICIAN ASSISTANT

## 2022-02-17 PROCEDURE — 82310 ASSAY OF CALCIUM: CPT | Performed by: PHYSICIAN ASSISTANT

## 2022-02-17 PROCEDURE — 96372 THER/PROPH/DIAG INJ SC/IM: CPT | Mod: 59 | Performed by: PHYSICIAN ASSISTANT

## 2022-02-17 PROCEDURE — 82947 ASSAY GLUCOSE BLOOD QUANT: CPT | Performed by: PHYSICIAN ASSISTANT

## 2022-02-17 PROCEDURE — 250N000011 HC RX IP 250 OP 636: Performed by: INTERNAL MEDICINE

## 2022-02-17 PROCEDURE — 85025 COMPLETE CBC W/AUTO DIFF WBC: CPT | Performed by: PHYSICIAN ASSISTANT

## 2022-02-17 PROCEDURE — 250N000011 HC RX IP 250 OP 636: Performed by: PHYSICIAN ASSISTANT

## 2022-02-17 PROCEDURE — 82378 CARCINOEMBRYONIC ANTIGEN: CPT | Performed by: PHYSICIAN ASSISTANT

## 2022-02-17 RX ORDER — HEPARIN SODIUM (PORCINE) LOCK FLUSH IV SOLN 100 UNIT/ML 100 UNIT/ML
5 SOLUTION INTRAVENOUS ONCE
Status: COMPLETED | OUTPATIENT
Start: 2022-02-17 | End: 2022-02-17

## 2022-02-17 RX ORDER — HEPARIN SODIUM (PORCINE) LOCK FLUSH IV SOLN 100 UNIT/ML 100 UNIT/ML
5 SOLUTION INTRAVENOUS EVERY 8 HOURS
Status: DISCONTINUED | OUTPATIENT
Start: 2022-02-17 | End: 2022-02-17 | Stop reason: HOSPADM

## 2022-02-17 RX ADMIN — Medication 5 ML: at 11:45

## 2022-02-17 RX ADMIN — DENOSUMAB 120 MG: 120 INJECTION SUBCUTANEOUS at 11:43

## 2022-02-17 RX ADMIN — Medication 5 ML: at 10:40

## 2022-02-17 RX ADMIN — SODIUM CHLORIDE 250 ML: 9 INJECTION, SOLUTION INTRAVENOUS at 11:41

## 2022-02-17 RX ADMIN — TRASTUZUMAB 450 MG: 150 INJECTION, POWDER, LYOPHILIZED, FOR SOLUTION INTRAVENOUS at 11:41

## 2022-02-17 ASSESSMENT — PAIN SCALES - GENERAL: PAINLEVEL: NO PAIN (0)

## 2022-02-17 NOTE — PROGRESS NOTES
Infusion Nursing Note:  Hoda Brush presents today for C76 Herceptin-Xgeva.    Patient seen by provider today: No   present during visit today: Not Applicable.    Note: Patient denies the following: fevers, body aches, chills, headaches, vision changes, dizziness, chest pain, shortness of breath, nausea, vomiting, constipation, abdominal pain, rashes, bruising/bleeding, mouth sores, swelling or pain in the legs. Ok for treatment.       Intravenous Access:  Implanted Port.    Treatment Conditions:  Lab Results   Component Value Date    HGB 11.9 02/17/2022    WBC 7.4 02/17/2022    ANEU 3.1 06/22/2021    ANEUTAUTO 4.1 02/17/2022     02/17/2022      Lab Results   Component Value Date     02/17/2022    POTASSIUM 4.0 02/17/2022    CR 0.75 02/17/2022    TAYLER 8.8 02/17/2022    BILITOTAL 0.2 02/17/2022    ALBUMIN 3.2 (L) 02/17/2022    ALT 48 02/17/2022    AST 38 02/17/2022     Results reviewed, labs MET treatment parameters, ok to proceed with treatment.   ECHO 11/30/22 55-60%    Post Infusion Assessment:  Patient tolerated infusion without incident.  Patient tolerated injection into R arm without incident.  Blood return noted pre and post infusion.  Site patent and intact, free from redness, edema or discomfort.  No evidence of extravasations.  Access discontinued per protocol.       Discharge Plan:   Patient declined prescription refills.  Discharge instructions reviewed with: Patient.  Patient and/or family verbalized understanding of discharge instructions and all questions answered.  AVS to patient via [employer].  Patient will return 3/9 for labs/CT/ECHO and 3/10 for MD/infusion.   Patient discharged in stable condition accompanied by: self.  Departure Mode: Ambulatory.    Susu Seay RN

## 2022-02-17 NOTE — NURSING NOTE
Chief Complaint   Patient presents with     Port Draw     Labs drawn via port by RN in lab. VS taken.     Port accessed with 20g flat needle by RN, labs collected, line flushed with saline and heparin.  Vitals taken. Pt checked in for appointment(s).    Jeanna SHERIFF RN PHN BSN  BMT/Oncology Lab

## 2022-03-07 ENCOUNTER — OFFICE VISIT (OUTPATIENT)
Dept: FAMILY MEDICINE | Facility: CLINIC | Age: 67
End: 2022-03-07
Payer: COMMERCIAL

## 2022-03-07 VITALS
WEIGHT: 178 LBS | HEART RATE: 92 BPM | RESPIRATION RATE: 16 BRPM | TEMPERATURE: 97.1 F | DIASTOLIC BLOOD PRESSURE: 82 MMHG | HEIGHT: 63 IN | BODY MASS INDEX: 31.54 KG/M2 | SYSTOLIC BLOOD PRESSURE: 123 MMHG | OXYGEN SATURATION: 97 %

## 2022-03-07 DIAGNOSIS — C50.919 METASTATIC BREAST CANCER: ICD-10-CM

## 2022-03-07 DIAGNOSIS — C79.51 BONE METASTASIS: ICD-10-CM

## 2022-03-07 DIAGNOSIS — Z90.12 S/P MASTECTOMY, LEFT: ICD-10-CM

## 2022-03-07 DIAGNOSIS — R74.8 ELEVATED LIVER ENZYMES: ICD-10-CM

## 2022-03-07 DIAGNOSIS — L82.1 SEBORRHEIC KERATOSIS: ICD-10-CM

## 2022-03-07 DIAGNOSIS — L30.9 ECZEMA, UNSPECIFIED TYPE: ICD-10-CM

## 2022-03-07 DIAGNOSIS — M87.9 OSTEONECROSIS, UNSPECIFIED (H): ICD-10-CM

## 2022-03-07 DIAGNOSIS — E78.5 HYPERLIPIDEMIA, UNSPECIFIED HYPERLIPIDEMIA TYPE: Primary | ICD-10-CM

## 2022-03-07 PROCEDURE — 99214 OFFICE O/P EST MOD 30 MIN: CPT | Performed by: INTERNAL MEDICINE

## 2022-03-07 RX ORDER — TRIAMCINOLONE ACETONIDE 1 MG/G
CREAM TOPICAL 2 TIMES DAILY
Qty: 80 G | Refills: 1 | Status: SHIPPED | OUTPATIENT
Start: 2022-03-07 | End: 2022-03-29

## 2022-03-07 ASSESSMENT — PAIN SCALES - GENERAL: PAINLEVEL: NO PAIN (0)

## 2022-03-07 NOTE — PATIENT INSTRUCTIONS
Have lipid panel and liver enzymes test with next lab appointment   There is this is a new shingles vaccine available called shingrex  It is a series of 2 shots 2-6 months apart.  Considered more than 90% effective.  Please go to any pharmacy to get the  Vaccine  Use triamcinolone cream twice a day for your rash on both feet for no more than 14 days if you use for two weeks then stop for one week  Make appointment with dermatology.  Follow up in  6 months  Seek sooner medical attention if there is any worsening of symptoms or problems.

## 2022-03-07 NOTE — PROGRESS NOTES
Assessment & Plan     Hoda was seen today for follow up.    Diagnoses and all orders for this visit:    Hyperlipidemia, unspecified hyperlipidemia type  -     Lipid panel reflex to direct LDL Fasting; Future  Low-cholesterol low-fat diet  Get lipid panel done with next blood draw which is a scheduled for redness day  Blood draw is going to be early morning so she will be fasting    Elevated liver enzymes  -     Comprehensive metabolic panel; Future  History of elevated liver enzymes but they are trending down  She has fatty liver    Eczema, unspecified type  Comments:  dorsal aspect of both feet  Orders:  -     triamcinolone (KENALOG) 0.1 % external cream; Apply topically 2 times daily  She has itchy rash on the dorsal aspect of both feet  Denied any new thing no new shoes no new soap or use of any new lotion  Clinically looks like eczema  It is itchy  There are plaques  No signs of any infection  Educated her about use of his steroid cream  If it does not improve then I would consider referring her to dermatology for this    Metastatic breast cancer (H)    Per oncology note:  Hoda Brush is a 66-year-old woman with a history of ER-positive, MN-positive, HER2 positive breast cancer.  She is from [facility], formally from [facility], and came to live in the [place] with [place] daughter.  The tumor is ER positive, MN positive and HER2 positive.  She had metastatic disease at the time of presentation with bone-only metastases by report.  She underwent neoadjuvant CAF, had a left mastectomy, left axillary lymph node dissection, radiation to the right hip, which included the right iliac region where she had metastatic disease.  She was initially on tamoxifen but then was switched to letrozole.  She continues on this and every 3-week trastuzumab.  She has had no evidence of disease progression for the last 3 years.  We have continued Herceptin every 3 weeks as well as daily letrozole    Bone metastasis (H)  See the  "note above    S/P mastectomy, left  See the note above    Osteonecrosis, unspecified (H)  per note dated 3/17/2020 patient had right maxillary osteonecrosis. stopped denosumab due to that    Seborrheic keratosis  -     Adult Dermatology Referral; Future  He has several produces lesion under the breast which are irritated and inflamed and she would like to get that removed         See Patient Instructions  Patient Instructions   Have lipid panel and liver enzymes test with next lab appointment   There is this is a new shingles vaccine available called shingrex  It is a series of 2 shots 2-6 months apart.  Considered more than 90% effective.  Please go to any pharmacy to get the  Vaccine  Use triamcinolone cream twice a day for your rash on both feet for no more than 14 days if you use for two weeks then stop for one week  Make appointment with dermatology.  Follow up in  6 months  Seek sooner medical attention if there is any worsening of symptoms or problems.        Return in about 6 months (around 9/7/2022) for medication follow up.    Laury Dee MD  [facility] TALI Drummond is a 67 year old who presents for the following health issues     HPI     She is followed by oncology and that treatment is going well  She has metastatic breast cancer but currently under control    Review of Systems   Constitutional, HEENT, cardiovascular, pulmonary, GI, , musculoskeletal, neuro, skin, endocrine and psych systems are negative, except as otherwise noted.      Objective    /82 (BP Location: Right arm, Patient Position: Sitting, Cuff Size: Adult Large)   Pulse 92   Temp 97.1  F (36.2  C) (Temporal)   Resp 16   Ht 1.594 m (5' 2.75\")   Wt 80.7 kg (178 lb)   SpO2 97%   BMI 31.78 kg/m    Body mass index is 31.78 kg/m .  Physical Exam       GENERAL APPEARANCE: healthy, alert and no distress  EYES: Eyes grossly normal to inspection, PERRL and conjunctivae and sclerae normal  HENT: ear " canals and TM's normal and nose and mouth without ulcers or lesions  NECK: no adenopathy  RESP: lungs clear to auscultation - no rales, rhonchi or wheezes  CV: regular rates and rhythm, normal S1 S2, no S3        She has eczematous dermatitis on her dorsal aspect of the feet  She has erythematous patches or plaques with thickening of the skin      Disclaimer: This note consists of symbols derived from keyboarding, dictation and/or voice recognition software. As a result, there may be errors in the script that have gone undetected. Please consider this when interpreting information found in this chart.

## 2022-03-08 ENCOUNTER — TELEPHONE (OUTPATIENT)
Dept: FAMILY MEDICINE | Facility: CLINIC | Age: 67
End: 2022-03-08
Payer: COMMERCIAL

## 2022-03-08 NOTE — TELEPHONE ENCOUNTER
"Patient wants all the office codes to to see if this is a covered procedure  Advised that if this is an Sk we usually use cryo or a shave  She would like the office visit code as well-  Routing to provider to advise,    Thank you,    Cortney KEBEDERN BSN  [facility]- [phone]      \"Irritated Seborrheic keratoses L82.0, treatment code 02001\"            "

## 2022-03-08 NOTE — TELEPHONE ENCOUNTER
Alan Barr,   I would agree with the diagnosis code, L82.0 for inflamed or irriated seborrheic keratoses.  I would think that due to the bothersome nature, this would be covered especially with the site and her history.     JOSELIN this patient is scheduled with jany rausch.     Alejandra

## 2022-03-08 NOTE — TELEPHONE ENCOUNTER
[facility] Call Center    Phone Message    May a detailed message be left on voicemail: yes     Reason for Call: Pt is needing to have a removal and the location is near her Mastectomy location and with the bra, it keeps rubbing and irritating her skin in the area. Insurance is not willing to cover if it is a cosmetic reasonings for Appt. Insurance will only cover cost if it is medically necessary for this Appt. Pt can not afford if insurance will not cover.     Pt also had a referral for Appt. From her DR.      Please call Pt's daughter to discuss or for any questions. (Shyla [phone])    To report if medical necessary call Provider assistance center at [phone].    Thanks      Action Taken: Message routed to:  [facility] Derm    Travel Screening: Not Applicable

## 2022-03-09 ENCOUNTER — ANCILLARY PROCEDURE (OUTPATIENT)
Dept: CARDIOLOGY | Facility: CLINIC | Age: 67
End: 2022-03-09
Attending: INTERNAL MEDICINE
Payer: COMMERCIAL

## 2022-03-09 ENCOUNTER — ANCILLARY PROCEDURE (OUTPATIENT)
Dept: CT IMAGING | Facility: CLINIC | Age: 67
End: 2022-03-09
Attending: PHYSICIAN ASSISTANT
Payer: COMMERCIAL

## 2022-03-09 ENCOUNTER — LAB (OUTPATIENT)
Dept: LAB | Facility: CLINIC | Age: 67
End: 2022-03-09
Attending: INTERNAL MEDICINE
Payer: COMMERCIAL

## 2022-03-09 DIAGNOSIS — Z51.11 ENCOUNTER FOR ANTINEOPLASTIC CHEMOTHERAPY: ICD-10-CM

## 2022-03-09 DIAGNOSIS — C50.912 MALIGNANT NEOPLASM OF LEFT FEMALE BREAST, UNSPECIFIED ESTROGEN RECEPTOR STATUS, UNSPECIFIED SITE OF BREAST (H): ICD-10-CM

## 2022-03-09 DIAGNOSIS — Z95.828 PORT-A-CATH IN PLACE: Primary | ICD-10-CM

## 2022-03-09 DIAGNOSIS — R74.8 ELEVATED LIVER ENZYMES: ICD-10-CM

## 2022-03-09 DIAGNOSIS — E78.5 HYPERLIPIDEMIA, UNSPECIFIED HYPERLIPIDEMIA TYPE: ICD-10-CM

## 2022-03-09 LAB
ALBUMIN SERPL-MCNC: 3.4 G/DL (ref 3.4–5)
ALP SERPL-CCNC: 49 U/L (ref 40–150)
ALT SERPL W P-5'-P-CCNC: 45 U/L (ref 0–50)
ANION GAP SERPL CALCULATED.3IONS-SCNC: 6 MMOL/L (ref 3–14)
AST SERPL W P-5'-P-CCNC: 33 U/L (ref 0–45)
BASOPHILS # BLD AUTO: 0 10E3/UL (ref 0–0.2)
BASOPHILS NFR BLD AUTO: 1 %
BILIRUB SERPL-MCNC: 0.4 MG/DL (ref 0.2–1.3)
BUN SERPL-MCNC: 17 MG/DL (ref 7–30)
CALCIUM SERPL-MCNC: 9.1 MG/DL (ref 8.5–10.1)
CANCER AG27-29 SERPL-ACNC: 12 U/ML (ref 0–39)
CEA SERPL-MCNC: <0.5 UG/L (ref 0–2.5)
CHLORIDE BLD-SCNC: 107 MMOL/L (ref 94–109)
CHOLEST SERPL-MCNC: 221 MG/DL
CO2 SERPL-SCNC: 29 MMOL/L (ref 20–32)
CREAT SERPL-MCNC: 0.79 MG/DL (ref 0.52–1.04)
EOSINOPHIL # BLD AUTO: 0.3 10E3/UL (ref 0–0.7)
EOSINOPHIL NFR BLD AUTO: 4 %
ERYTHROCYTE [DISTWIDTH] IN BLOOD BY AUTOMATED COUNT: 13.6 % (ref 10–15)
FASTING STATUS PATIENT QL REPORTED: YES
GFR SERPL CREATININE-BSD FRML MDRD: 82 ML/MIN/1.73M2
GLUCOSE BLD-MCNC: 101 MG/DL (ref 70–99)
HCT VFR BLD AUTO: 40.4 % (ref 35–47)
HDLC SERPL-MCNC: 52 MG/DL
HGB BLD-MCNC: 12.4 G/DL (ref 11.7–15.7)
IMM GRANULOCYTES # BLD: 0 10E3/UL
IMM GRANULOCYTES NFR BLD: 0 %
LDLC SERPL CALC-MCNC: 139 MG/DL
LVEF ECHO: NORMAL
LYMPHOCYTES # BLD AUTO: 2.8 10E3/UL (ref 0.8–5.3)
LYMPHOCYTES NFR BLD AUTO: 40 %
MCH RBC QN AUTO: 28.8 PG (ref 26.5–33)
MCHC RBC AUTO-ENTMCNC: 30.7 G/DL (ref 31.5–36.5)
MCV RBC AUTO: 94 FL (ref 78–100)
MONOCYTES # BLD AUTO: 0.5 10E3/UL (ref 0–1.3)
MONOCYTES NFR BLD AUTO: 7 %
NEUTROPHILS # BLD AUTO: 3.4 10E3/UL (ref 1.6–8.3)
NEUTROPHILS NFR BLD AUTO: 48 %
NONHDLC SERPL-MCNC: 169 MG/DL
NRBC # BLD AUTO: 0 10E3/UL
NRBC BLD AUTO-RTO: 0 /100
PLATELET # BLD AUTO: 226 10E3/UL (ref 150–450)
POTASSIUM BLD-SCNC: 4 MMOL/L (ref 3.4–5.3)
PROT SERPL-MCNC: 7.8 G/DL (ref 6.8–8.8)
RBC # BLD AUTO: 4.3 10E6/UL (ref 3.8–5.2)
SODIUM SERPL-SCNC: 142 MMOL/L (ref 133–144)
TRIGL SERPL-MCNC: 148 MG/DL
WBC # BLD AUTO: 6.9 10E3/UL (ref 4–11)

## 2022-03-09 PROCEDURE — 80053 COMPREHEN METABOLIC PANEL: CPT

## 2022-03-09 PROCEDURE — 36591 DRAW BLOOD OFF VENOUS DEVICE: CPT

## 2022-03-09 PROCEDURE — 82378 CARCINOEMBRYONIC ANTIGEN: CPT

## 2022-03-09 PROCEDURE — 82040 ASSAY OF SERUM ALBUMIN: CPT

## 2022-03-09 PROCEDURE — 85025 COMPLETE CBC W/AUTO DIFF WBC: CPT

## 2022-03-09 PROCEDURE — 86300 IMMUNOASSAY TUMOR CA 15-3: CPT

## 2022-03-09 PROCEDURE — 80061 LIPID PANEL: CPT

## 2022-03-09 PROCEDURE — 71260 CT THORAX DX C+: CPT | Performed by: RADIOLOGY

## 2022-03-09 PROCEDURE — 250N000011 HC RX IP 250 OP 636: Performed by: INTERNAL MEDICINE

## 2022-03-09 PROCEDURE — 93306 TTE W/DOPPLER COMPLETE: CPT | Performed by: INTERNAL MEDICINE

## 2022-03-09 RX ORDER — HEPARIN SODIUM (PORCINE) LOCK FLUSH IV SOLN 100 UNIT/ML 100 UNIT/ML
500 SOLUTION INTRAVENOUS EVERY 8 HOURS
Status: DISCONTINUED | OUTPATIENT
Start: 2022-03-09 | End: 2022-03-15 | Stop reason: HOSPADM

## 2022-03-09 RX ORDER — HEPARIN SODIUM (PORCINE) LOCK FLUSH IV SOLN 100 UNIT/ML 100 UNIT/ML
500 SOLUTION INTRAVENOUS ONCE
Status: COMPLETED | OUTPATIENT
Start: 2022-03-09 | End: 2022-03-09

## 2022-03-09 RX ORDER — IOPAMIDOL 755 MG/ML
88 INJECTION, SOLUTION INTRAVASCULAR ONCE
Status: COMPLETED | OUTPATIENT
Start: 2022-03-09 | End: 2022-03-09

## 2022-03-09 RX ADMIN — Medication 500 UNITS: at 08:10

## 2022-03-09 RX ADMIN — HEPARIN SODIUM (PORCINE) LOCK FLUSH IV SOLN 100 UNIT/ML 500 UNITS: 100 SOLUTION at 09:09

## 2022-03-09 RX ADMIN — IOPAMIDOL 88 ML: 755 INJECTION, SOLUTION INTRAVASCULAR at 08:58

## 2022-03-09 NOTE — PROGRESS NOTES
Hoda is a patient from [facility] and is seen here for continuation of care for her metastatic ER+HER2- breast cancer.  She is a [employer]regational refugee from [facility] and was initially seen in clinic with her daughter.  The only data we have from [facility] is an English translation of her records.       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  On 02/11/2014, she was seen by an oncologist.  The clinical staging of T4b N2 M1 was noted.   Her breast cancer was on the left side. There was no right breast cancer, confirmed by the patient and her daughter, correcting a possible error in the translated records.  ER was positive in 100% of the cells, MS at 14% and HER2 was 3+ positive.  It appears that this histopathologic information is on the mastectomy specimen. She underwent an MRI for staging of presumptive bone metastases which was performed 03/02/2014.  There were skeletal metastases in the thoracic vertebrae at 12, L1, L3, L5 and S1 vertebral body, ranging in size from 0.7-3.0 cm in size.  Ischial and right femur were also involved, as well as a large iliac mass measuring about 15 cm in the uterus. 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   Radiation Oncology consultation was performed 03/11/2014, and she was given radiation in 1 dose of 6 Gy to the right iliac lesion.        With the initial diagnosis, she initiated treatment with 6 cycles of CAF neoadjuvant chemotherapy 02/14, 07/07, 03/28, 04/18/14, 05/08 and 05/29/14. She also received monthly zoledronic acid.  She then underwent a modified left mastectomy of Palacios type and left lymphadenoectomy on 06/27/2014.   Pathologic examination showed number at [facility] was [id number] showed infiltrative grade 2 ductal cancer with 6 level 1 metastatic lympFollow up with Jossie for visits and trastuzumab on 2-5, 2-26, 3-19 with CBC, CMP.  Echocardiogram on 3-19.  Follow up with me 4-9 with  CBC, CMP, CA27.29 and CEA and with CT CAP on 4-8.h nodes and 3 level 2 metastatic lymph nodes.  The differentiation was intermediate.  The tumor was ER positive 70% of the cells, NV positive in 40% of the cells and HER2 was 3+ by immunohistochemistry and the Ki-67 labeling index was 20%. Her staging after surgery was stage IV, pT4b N2 M1.  I don't see a biopsy of the skeletal metastases.  She then had continuation with chemotherapy with 4 cycles of Herceptin and taxane with monthly zoledronic acid.  Tamoxifen was initiated.  She then had two years of Herceptin and a decision was made not to continue further Herceptin.  She continued on zoledronic acid every 3 months. She was clinically stable. She then moved to the [place] new refugee from [facility].  She has now been changed to letrozole.  Denosumab has now been held for new diagnosis of osteonecrosis of the R maxilla.     History of cholecystectomy 2020.      TREATMENT HISTORY:  A. Initial diagnosis with metastatic breast cancer in [facility].  Neoadjuvant CAF x 6.   B. Left mastectomy. Left axillary node dissection.  C.  Radiation in 1 dose to R iliac region.  C. Herceptin for 2 years taxane for a prescribed course then stopped, monthly zoledronic acid.  She had 2 years of Herceptin with tamoxifen added after chemotherapy.  D.  Tamoxifen alone and zoledronic acid every 3 months starting 2014.  Letrozole was started   E.  [place]  We restarted Herceptin every 3 weeks and continued tamoxifen. Bone targeted agent changed to denosumab every 9 weeks. Zometa discontinued 2017.  Tamoxifen was changed to letrozole August 2019.    F.  Xgeva discontinued 12-17-19 because of dental issues.   G.  J+J vaccine March, 2021.  H.  Was on Zometa once May 11.  Reaction with eye lid swelling. Discontinued Zometal  H.  Restart Xgeva 6-22-21.  Continuing the trastuzumab and letrozole.  Both tolerated well.       INTERVAL HISTORY    Hoda returns to clinic and has been feeling quite  well.  She has no pain, fatigue, depression, anxiety.  She does have some eczema on the dorsum of her left foot and she saw a dermatologist and this is being treated with a topical steroid.  She has had eczema on the dorsum of her left foot for about 1-1/2 months.  She has had resolution of her gastrointestinal symptoms and specifically had some gallbladder problems a couple of months ago, but these problems have resolved.  She is eating and drinking without any difficulty.  She did have a CT scan of the chest, abdomen and pelvis, which is stable with stable lung nodules.  She is pleased with this result, as are we.  She does note a mild rash under her mastectomy bra along the left anterior chest wall below the mastectomy incision.    She is eating a healthful diet, Mediterranean style.  She is exercising and trying to get to 150 minutes a week.  She does have a treadmill at home.  She does not drink alcohol.  She takes vitamin D and calcium and she has had COVID vaccination x3 as well as Pneumovax last week.       REVIEW OF SYSTEMS:  The remainder of a 10 point ROS was negative.      PHYSICAL EXAM:   VITAL SIGNS:  /76   Pulse 84   Temp 97.8  F (36.6  C) (Oral)   Wt 80.7 kg (178 lb)   SpO2 96%   BMI 31.78 kg/m    GENERAL:  Hoda appeared generally well.  She appeared generally well.  No jaundice.  No alopecia    HEENT:  Oropharynx is without lesions.  LYMPH:  There is no palpable cervical, supraclavicular, subclavicular or axillary lymphadenopathy.    BREASTS:  Examination of the left breast reveals no masses.  Examination of the left anterior chest wall reveals a left mastectomy incision. Below the incision, there are some pigmented maculopapular skin lesions.  They do not appear suspicious.  There are no notable dermal lesions.  LUNGS:  Clear to percussion and auscultation.  HEART:  Regular rate and rhythm.  S1, S2.  ABDOMEN:  Soft, nontender without hepatosplenomegaly.  EXTREMITIES:  Without edema.     SKIN:  There is a faint erythematous macular rash on the dorsum of the left foot.  PSYCH:  Mood and affect were normal.     Labs    Remarkable for cholesterol of 221.  The CBC and CMP were within normal limits and markers were in the normal range.      ECG: First-degree AV block otherwise normal ECG.     Imaging   CT CAP December 13, 2021  IMPRESSION:  1.  Stable pulmonary nodules.  2.  Prominent left axillary lymph node is smaller than on the prior  study.  3.  Skeletal metastases appear stable.      CHAY GAUTHIER MD     CT chest March 9, 2022  IMPRESSION: Continued appearance of left mastectomy, bilateral  pulmonary nodules, osteoblastic metastases an 5 mm short axis left  axillary lymph node. Cholecystectomy. All findings unchanged.     LELA CHENG MD        ASSESSMENT AND PLAN:     1.  Hoda Brush is a 67-year-old woman with a history of ER-positive, [place]-positive, HER2 positive breast cancer.  She is from [facility], formally from [facility], and came to live in the [place] with her [place].  The tumor is ER positive, FL positive and HER2 positive.  She had metastatic disease at the time of presentation with bone-only metastases by report.  She underwent neoadjuvant CAF, had a left mastectomy, left axillary lymph node dissection, radiation to the right hip, which included the right iliac region where she had metastatic disease.  She was initially on tamoxifen but then was switched to letrozole.  She continues on this and every 3-week trastuzumab.  She has had no evidence of disease progression for the last 3 years.  Markers are low and stable.  We have continued Herceptin every 3 weeks as well as daily letrozole. CT CAP shows stable pulmonary nodules.  The left axillary lymph node is decreased compared with the prior CT.  2.  Episode of abdominal pain and elevated LFT's and mild intrahepatic biliar dilatation.  This problem has resolved and may have been related to a gallstone.  She had an  MRCP which was normal except for fatty liver and status post cholecystectomy.  3.  Mild transaminase elevations.  Restaging showed no evidence of liver metastases.  These LFT elevations may be due to fatty liver.  These elevations have resolved.  4.  Continue the letrozole.  Letrozole has been well-tolerated.  No significant joint stiffness.  5.  Echo. Stable EF. --Echo in  showed normal EF without change 60 to 65%. 2-16-21.  6.  Discussion of bone health and right maxillary osteonecrosis.  She will continue with calcium and vitamin D. We will change the Xgeva to every 3 months.  7. Follow up. Continue Xgeva on 4-21, 6-23.  Continue trastuzumab 3-10, 3-31, 4-21, 5-12, 6-2, 6-23.  CT CAP on 6-1.  KARISSA visits 3-31, me 4-21, KARISSA 5-12 and 6-2 and me June 23 with CBC, CMP.  CBC, CMP all visits.  CA27.29 and CEA 1-27 and 3-10.  Echocardiogram 6-23.      Thank you for allowing us to participate in this patient's care.      Sincerely,     Lisandro Aguiar M.D.   Professor   Division of Hematology, Oncology, Transplant   Department of Medicine   [employer]   [phone]           8:00-8:35  I spent 35 minutes with the patient more than 50% of which was in counseling and coordination of care.

## 2022-03-09 NOTE — RESULT ENCOUNTER NOTE
Phil Drummond,    This is to inform you regarding your test result.    Your total cholesterol is elevated.  HDL which is called good cholesterol is normal.  Your LDL which is called bad cholesterol is elevated.  Eat low cholesterol low fat  diet and do regular physical activity.  The testing of your kidney function, liver function and electrolytes was satisfactory   Glucose which is your blood sugar is slightly elevated.  Avoid high sugar containing food.      Sincerely,      Dr.Nasima Luiz MD,FACP

## 2022-03-09 NOTE — NURSING NOTE
Chief Complaint   Patient presents with     Labs Only     labs drawn via port by RN in lab, heparin locked       Port accessed by RN using POWER 20g 3/4 inch needle. Labs drawn, line flushed with saline and heparin. Pt tolerated well.  Pt directed to next appt.    Emily Pitts RN

## 2022-03-10 ENCOUNTER — INFUSION THERAPY VISIT (OUTPATIENT)
Dept: ONCOLOGY | Facility: CLINIC | Age: 67
End: 2022-03-10
Attending: INTERNAL MEDICINE
Payer: COMMERCIAL

## 2022-03-10 VITALS
DIASTOLIC BLOOD PRESSURE: 76 MMHG | SYSTOLIC BLOOD PRESSURE: 119 MMHG | TEMPERATURE: 97.8 F | WEIGHT: 178 LBS | HEART RATE: 84 BPM | BODY MASS INDEX: 31.78 KG/M2 | OXYGEN SATURATION: 96 %

## 2022-03-10 DIAGNOSIS — C50.912 MALIGNANT NEOPLASM OF LEFT FEMALE BREAST, UNSPECIFIED ESTROGEN RECEPTOR STATUS, UNSPECIFIED SITE OF BREAST (H): ICD-10-CM

## 2022-03-10 DIAGNOSIS — Z51.11 ENCOUNTER FOR ANTINEOPLASTIC CHEMOTHERAPY: ICD-10-CM

## 2022-03-10 DIAGNOSIS — C79.51 BONE METASTASIS: Primary | ICD-10-CM

## 2022-03-10 DIAGNOSIS — C50.912 MALIGNANT NEOPLASM OF LEFT FEMALE BREAST, UNSPECIFIED ESTROGEN RECEPTOR STATUS, UNSPECIFIED SITE OF BREAST (H): Primary | ICD-10-CM

## 2022-03-10 PROCEDURE — 99214 OFFICE O/P EST MOD 30 MIN: CPT | Performed by: INTERNAL MEDICINE

## 2022-03-10 PROCEDURE — 96413 CHEMO IV INFUSION 1 HR: CPT

## 2022-03-10 PROCEDURE — G0463 HOSPITAL OUTPT CLINIC VISIT: HCPCS

## 2022-03-10 PROCEDURE — 250N000011 HC RX IP 250 OP 636: Performed by: INTERNAL MEDICINE

## 2022-03-10 PROCEDURE — 258N000003 HC RX IP 258 OP 636: Performed by: INTERNAL MEDICINE

## 2022-03-10 RX ORDER — HEPARIN SODIUM (PORCINE) LOCK FLUSH IV SOLN 100 UNIT/ML 100 UNIT/ML
5 SOLUTION INTRAVENOUS EVERY 8 HOURS
Status: DISCONTINUED | OUTPATIENT
Start: 2022-03-10 | End: 2022-03-10 | Stop reason: HOSPADM

## 2022-03-10 RX ORDER — MEPERIDINE HYDROCHLORIDE 25 MG/ML
25 INJECTION INTRAMUSCULAR; INTRAVENOUS; SUBCUTANEOUS EVERY 30 MIN PRN
Status: CANCELLED | OUTPATIENT
Start: 2022-03-10

## 2022-03-10 RX ORDER — EPINEPHRINE 0.3 MG/.3ML
0.3 INJECTION SUBCUTANEOUS EVERY 5 MIN PRN
Status: CANCELLED | OUTPATIENT
Start: 2022-03-10

## 2022-03-10 RX ORDER — EPINEPHRINE 1 MG/ML
0.3 INJECTION, SOLUTION INTRAMUSCULAR; SUBCUTANEOUS EVERY 5 MIN PRN
Status: CANCELLED | OUTPATIENT
Start: 2022-03-10

## 2022-03-10 RX ORDER — DIPHENHYDRAMINE HCL 25 MG
50 CAPSULE ORAL ONCE
Status: CANCELLED
Start: 2022-03-10

## 2022-03-10 RX ORDER — ALBUTEROL SULFATE 0.83 MG/ML
2.5 SOLUTION RESPIRATORY (INHALATION)
Status: CANCELLED | OUTPATIENT
Start: 2022-03-10

## 2022-03-10 RX ORDER — ALBUTEROL SULFATE 90 UG/1
1-2 AEROSOL, METERED RESPIRATORY (INHALATION)
Status: CANCELLED
Start: 2022-03-10

## 2022-03-10 RX ORDER — METHYLPREDNISOLONE SODIUM SUCCINATE 125 MG/2ML
125 INJECTION, POWDER, LYOPHILIZED, FOR SOLUTION INTRAMUSCULAR; INTRAVENOUS
Status: CANCELLED
Start: 2022-03-10

## 2022-03-10 RX ORDER — SODIUM CHLORIDE 9 MG/ML
1000 INJECTION, SOLUTION INTRAVENOUS CONTINUOUS PRN
Status: CANCELLED
Start: 2022-03-10

## 2022-03-10 RX ORDER — HEPARIN SODIUM (PORCINE) LOCK FLUSH IV SOLN 100 UNIT/ML 100 UNIT/ML
5 SOLUTION INTRAVENOUS EVERY 8 HOURS
Status: CANCELLED | OUTPATIENT
Start: 2022-03-10

## 2022-03-10 RX ORDER — LORAZEPAM 2 MG/ML
0.5 INJECTION INTRAMUSCULAR EVERY 4 HOURS PRN
Status: CANCELLED
Start: 2022-03-10

## 2022-03-10 RX ORDER — DIPHENHYDRAMINE HYDROCHLORIDE 50 MG/ML
50 INJECTION INTRAMUSCULAR; INTRAVENOUS
Status: CANCELLED
Start: 2022-03-10

## 2022-03-10 RX ORDER — ACETAMINOPHEN 325 MG/1
650 TABLET ORAL
Status: CANCELLED | OUTPATIENT
Start: 2022-03-10

## 2022-03-10 RX ADMIN — Medication 5 ML: at 09:52

## 2022-03-10 RX ADMIN — TRASTUZUMAB 450 MG: 150 INJECTION, POWDER, LYOPHILIZED, FOR SOLUTION INTRAVENOUS at 09:19

## 2022-03-10 ASSESSMENT — PAIN SCALES - GENERAL: PAINLEVEL: NO PAIN (0)

## 2022-03-10 NOTE — NURSING NOTE
"Oncology Rooming Note    March 10, 2022 7:29 AM   Hoda Brush is a 67 year old female who presents for:    Chief Complaint   Patient presents with     Oncology Clinic Visit     rtn for breast cancer     Initial Vitals: /76   Pulse 84   Temp 97.8  F (36.6  C) (Oral)   Wt 80.7 kg (178 lb)   SpO2 96%   BMI 31.78 kg/m   Estimated body mass index is 31.78 kg/m  as calculated from the following:    Height as of 3/7/22: 1.594 m (5' 2.75\").    Weight as of this encounter: 80.7 kg (178 lb). Body surface area is 1.89 meters squared.  No Pain (0) Comment: Data Unavailable   No LMP recorded. Patient is postmenopausal.  Allergies reviewed: Yes  Medications reviewed: Yes    Medications: Medication refills not needed today.  Pharmacy name entered into [employer]: [id number] - [place], [place] - [address]    Clinical concerns: none       Marilee Humphrey, EMT            "

## 2022-03-10 NOTE — TELEPHONE ENCOUNTER
Is there an OV charge or just a procedure code? If there is not a procedure- what is the new patient consult code?    Cortney KEBEDE RN BSN  [facility]  [phone]

## 2022-03-10 NOTE — LETTER
3/10/2022     RE: Hoda Brush  [address]  [facility] [address]    Dear Colleague,    Thank you for referring your patient, Hoda Brush, to the [facility]. Please see a copy of my visit note below.    Hoda is a patient from [facility] and is seen here for continuation of care for her metastatic ER+HER2- breast cancer.  She is a [place] refugee from [facility] and was initially seen in clinic with her daughter.  The only data we have from [facility] is an English translation of her records.       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  On 02/11/2014, she was seen by an oncologist.  The clinical staging of T4b N2 M1 was noted.   Her breast cancer was on the left side. There was no right breast cancer, confirmed by the patient and her daughter, correcting a possible error in the translated records.  ER was positive in 100% of the cells, NJ at 14% and HER2 was 3+ positive.  It appears that this histopathologic information is on the mastectomy specimen. She underwent an MRI for staging of presumptive bone metastases which was performed 03/02/2014.  There were skeletal metastases in the thoracic vertebrae at 12, L1, L3, L5 and S1 vertebral body, ranging in size from 0.7-3.0 cm in size.  Ischial and right femur were also involved, as well as a large iliac mass measuring about 15 cm in the uterus. 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   Radiation Oncology consultation was performed 03/11/2014, and she was given radiation in 1 dose of 6 Gy to the right iliac lesion.        With the initial diagnosis, she initiated treatment with 6 cycles of CAF neoadjuvant chemotherapy 02/14, 07/07, 03/28, 04/18/14, 05/08 and 05/29/14. She also received monthly zoledronic acid.  She then underwent a modified left mastectomy of Palacios type and left lymphadenoectomy on 06/27/2014.   Pathologic  examination showed number at [facility] was [id number] showed infiltrative grade 2 ductal cancer with 6 level 1 metastatic lympFollow up with Jossie for visits and trastuzumab on 2-5, 2-26, 3-19 with CBC, CMP.  Echocardiogram on 3-19.  Follow up with me 4-9 with CBC, CMP, CA27.29 and CEA and with CT CAP on 4-8.h nodes and 3 level 2 metastatic lymph nodes.  The differentiation was intermediate.  The tumor was ER positive 70% of the cells, WV positive in 40% of the cells and HER2 was 3+ by immunohistochemistry and the Ki-67 labeling index was 20%. Her staging after surgery was stage IV, pT4b N2 M1.  I don't see a biopsy of the skeletal metastases.  She then had continuation with chemotherapy with 4 cycles of Herceptin and taxane with monthly zoledronic acid.  Tamoxifen was initiated.  She then had two years of Herceptin and a decision was made not to continue further Herceptin.  She continued on zoledronic acid every 3 months. She was clinically stable. [place] then [place] the [place] new [place] from [facility].  She has now been changed to letrozole.  Denosumab has now been held for new diagnosis of osteonecrosis of the R maxilla.     History of cholecystectomy 2020.      TREATMENT HISTORY:  A. Initial diagnosis with metastatic breast cancer in [facility].  Neoadjuvant CAF x 6.   B. Left mastectomy. Left axillary node dissection.  C.  Radiation in 1 dose to R iliac region.  C. Herceptin for 2 years taxane for a prescribed course then stopped, monthly zoledronic acid.  She had 2 years of Herceptin with tamoxifen added after chemotherapy.  D.  Tamoxifen alone and zoledronic acid every 3 months starting 2014.  Letrozole was started   E.  [place]  We restarted Herceptin every 3 weeks and continued tamoxifen. Bone targeted agent changed to denosumab every 9 weeks. Zometa discontinued 2017.  Tamoxifen was changed to letrozole August 2019.    GIANLUCA  Xgeva discontinued 12-17-19 because of dental issues.   G.  J+J vaccine  March, 2021.  H.  Was on Zometa once May 11.  Reaction with eye lid swelling. Discontinued Zometal  H.  Restart Xgeva 6-22-21.  Continuing the trastuzumab and letrozole.  Both tolerated well.       INTERVAL HISTORY    Hoda returns to clinic and has been feeling quite well.  She has no pain, fatigue, depression, anxiety.  She does have some eczema on the dorsum of her left foot and she saw a dermatologist and this is being treated with a topical steroid.  She has had eczema on the dorsum of her left foot for about 1-1/2 months.  She has had resolution of her gastrointestinal symptoms and specifically had some gallbladder problems a couple of months ago, but these problems have resolved.  She is eating and drinking without any difficulty.  She did have a CT scan of the chest, abdomen and pelvis, which is stable with stable lung nodules.  She is pleased with this result, as are we.  She does note a mild rash under her mastectomy bra along the left anterior chest wall below the mastectomy incision.    She is eating a healthful diet, Mediterranean style.  She is exercising and trying to get to 150 minutes a week.  She does have a treadmill at home.  She does not drink alcohol.  She takes vitamin D and calcium and she has had COVID vaccination x3 as well as Pneumovax last week.       REVIEW OF SYSTEMS:  The remainder of a 10 point ROS was negative.      PHYSICAL EXAM:   VITAL SIGNS:  /76   Pulse 84   Temp 97.8  F (36.6  C) (Oral)   Wt 80.7 kg (178 lb)   SpO2 96%   BMI 31.78 kg/m    GENERAL:  Hoda appeared generally well.  She appeared generally well.  No jaundice.  No alopecia    HEENT:  Oropharynx is without lesions.  LYMPH:  There is no palpable cervical, supraclavicular, subclavicular or axillary lymphadenopathy.    BREASTS:  Examination of the left breast reveals no masses.  Examination of the left anterior chest wall reveals a left mastectomy incision. Below the incision, there are some pigmented  maculopapular skin lesions.  They do not appear suspicious.  There are no notable dermal lesions.  LUNGS:  Clear to percussion and auscultation.  HEART:  Regular rate and rhythm.  S1, S2.  ABDOMEN:  Soft, nontender without hepatosplenomegaly.  EXTREMITIES:  Without edema.    SKIN:  There is a faint erythematous macular rash on the dorsum of the left foot.  PSYCH:  Mood and affect were normal.     Labs    Remarkable for cholesterol of 221.  The CBC and CMP were within normal limits and markers were in the normal range.      ECG: First-degree AV block otherwise normal ECG.     Imaging   CT CAP December 13, 2021  IMPRESSION:  1.  Stable pulmonary nodules.  2.  Prominent left axillary lymph node is smaller than on the prior  study.  3.  Skeletal metastases appear stable.      CHAY GAUTHIER MD     CT chest March 9, 2022  IMPRESSION: Continued appearance of left mastectomy, bilateral  pulmonary nodules, osteoblastic metastases an 5 mm short axis left  axillary lymph node. Cholecystectomy. All findings unchanged.     LELA CHENG MD        ASSESSMENT AND PLAN:     1.  Hoda Brush is a 67-year-old woman with a history of ER-positive, RI-positive, HER2 positive breast cancer.  She is from [facility], formally from [facility], and came to live in the [place] with her daughter.  The tumor is ER positive, RI positive and HER2 positive.  She had metastatic disease at the time of presentation with bone-only metastases by report.  She underwent neoadjuvant CAF, had a left mastectomy, left axillary lymph node dissection, radiation to the right hip, which included the right iliac region where she had metastatic disease.  She was initially on tamoxifen but then was switched to letrozole.  She continues on this and every 3-week trastuzumab.  She has had no evidence of disease progression for the last 3 years.  Markers are low and stable.  We have continued Herceptin every 3 weeks as well as daily letrozole. CT CAP  shows stable pulmonary nodules.  The left axillary lymph node is decreased compared with the prior CT.  2.  Episode of abdominal pain and elevated LFT's and mild intrahepatic biliar dilatation.  This problem has resolved and may have been related to a gallstone.  She had an MRCP which was normal except for fatty liver and status post cholecystectomy.  3.  Mild transaminase elevations.  Restaging showed no evidence of liver metastases.  These LFT elevations may be due to fatty liver.  These elevations have resolved.  4.  Continue the letrozole.  Letrozole has been well-tolerated.  No significant joint stiffness.  5.  Echo. Stable EF. --Echo in  showed normal EF without change 60 to 65%. 2-16-21.  6.  Discussion of bone health and right maxillary osteonecrosis.  She will continue with calcium and vitamin D. We will change the Xgeva to every 3 months.  7. Follow up. Continue Xgeva on 4-21, 6-23.  Continue trastuzumab 3-10, 3-31, 4-21, 5-12, 6-2, 6-23.  CT CAP on 6-1.  KARISSA visits 3-31, me 4-21, KARISSA 5-12 and 6-2 and me June 23 with CBC, CMP.  CBC, CMP all visits.  CA27.29 and CEA 1-27 and 3-10.  Echocardiogram 6-23.      Thank you for allowing us to participate in this patient's care.      Sincerely,     Lisandro Aguiar M.D.   Professor   Division of Hematology, Oncology, Transplant   Department of Medicine   [employer]   [phone]     8:00-8:35  I spent 35 minutes with the patient more than 50% of which was in counseling and coordination of care.

## 2022-03-10 NOTE — PROGRESS NOTES
Infusion Nursing Note:  Hoda Brush presents today for Cycle 7 Herceptin.    Patient seen and examined by Dr Aguiar in clinic prior to infusion        Intravenous Access:  Implanted Port.    Treatment Conditions:  Component      Latest Ref Rng & Units 3/9/2022   WBC      4.0 - 11.0 10e3/uL 6.9   RBC Count      3.80 - 5.20 10e6/uL 4.30   Hemoglobin      11.7 - 15.7 g/dL 12.4   Hematocrit      35.0 - 47.0 % 40.4   MCV      78 - 100 fL 94   MCH      26.5 - 33.0 pg 28.8   MCHC      31.5 - 36.5 g/dL 30.7 (L)   RDW      10.0 - 15.0 % 13.6   Platelet Count      150 - 450 10e3/uL 226   % Neutrophils      % 48   % Lymphocytes      % 40   % Monocytes      % 7   % Eosinophils      % 4   % Basophils      % 1   % Immature Granulocytes      % 0   NRBCs per 100 WBC      <1 /100 0   Absolute Neutrophils      1.6 - 8.3 10e3/uL 3.4   Absolute Lymphocytes      0.8 - 5.3 10e3/uL 2.8   Absolute Monocytes      0.0 - 1.3 10e3/uL 0.5   Absolute Eosinophils      0.0 - 0.7 10e3/uL 0.3   Absolute Basophils      0.0 - 0.2 10e3/uL 0.0   Absolute Immature Granulocytes      <=0.4 10e3/uL 0.0   Absolute NRBCs      10e3/uL 0.0   Sodium      133 - 144 mmol/L 142   Potassium      3.4 - 5.3 mmol/L 4.0   Chloride      94 - 109 mmol/L 107   Carbon Dioxide      20 - 32 mmol/L 29   Anion Gap      3 - 14 mmol/L 6   Urea Nitrogen      7 - 30 mg/dL 17   Creatinine      0.52 - 1.04 mg/dL 0.79   Calcium      8.5 - 10.1 mg/dL 9.1   Glucose      70 - 99 mg/dL 101 (H)   Alkaline Phosphatase      40 - 150 U/L 49   AST      0 - 45 U/L 33   ALT      0 - 50 U/L 45   Protein Total      6.8 - 8.8 g/dL 7.8   Albumin      3.4 - 5.0 g/dL 3.4   Bilirubin Total      0.2 - 1.3 mg/dL 0.4   GFR Estimate      >60 mL/min/1.73m2 82       ECHO/MUGA completed 3/9/22  EF 60-65%.  Results reviewed, labs MET treatment parameters, ok to proceed with treatment.      Post Infusion Assessment:  Patient tolerated infusion without incident.       Discharge Plan:   Patient  declined prescription refills.  AVS to patient via [employer].  Patient will return 3/31/22 for cycle 78  Odalis Moreno RN

## 2022-03-10 NOTE — TELEPHONE ENCOUNTER
New patient consult for just a spot of concern is level of service [id number]    If we do the procedure she would not get a visit charge unless she brings up other concerns that we are not treating.

## 2022-03-28 NOTE — PROGRESS NOTES
[facility] Dermatology Note  Encounter Date: Mar 29, 2022  {kkvisit3:938083}    Dermatology Problem List:  1. ***    ____________________________________________    Assessment & Plan:    # {Diagnosesderm:649057}.   {kkplans:291610}   - ***     # {Diagnosesderm:575971}.   {kkplans:737012}   - ***     Procedures Performed:   {kkprocedurenotes:210333}  {kkprocedurenotes:251090}    Follow-up: {kkfollowup:780377}    Staff and Scribe:     Scribe Disclosure:  Roselyn KIMBLE, am serving as a scribe to prep the notes for Alejandra Brooks PA-C .      ***  ____________________________________________    CC: No chief complaint on file.    HPI:  Ms. Hoda Brush is a(n) 67 year old female who presents today {kknew/return:026382} for ***    Patient is otherwise feeling well, without additional skin concerns.    Labs Reviewed:  ***N/A    Physical Exam:  Vitals: There were no vitals taken for this visit.  SKIN: {kkSkinExam:877061}  - ***  - {Skin Exam Derm:457383}.   - {Skin Exam Derm:579972}.   - {Skin Exam Derm:845261}.   - No other lesions of concern on areas examined.     Medications:  Current Outpatient Medications   Medication     acetaminophen (TYLENOL) 500 MG tablet     calcium carbonate (OS-TAYLER) 500 MG tablet     famotidine (PEPCID) 20 MG tablet     letrozole (FEMARA) 2.5 MG tablet     melatonin 3 MG tablet     order for DME     traZODone (DESYREL) 50 MG tablet     triamcinolone (KENALOG) 0.1 % external cream     VITAMIN D, CHOLECALCIFEROL, PO     No current facility-administered medications for this visit.      Past Medical History:   Patient Active Problem List   Diagnosis     Malignant neoplasm of left female breast, unspecified estrogen receptor status, unspecified site of breast (H)     Bone metastasis (H)     Cellulitis of left upper extremity     Metastatic breast cancer (H)     Acute cholecystitis     Fatty liver     S/P mastectomy, left     Family history of malignant  neoplasm of breast     Osteonecrosis, unspecified (H)     Past Medical History:   Diagnosis Date     Breast cancer metastasized to bone (H)      Lymphedema of left upper extremity         CC Laury Dee MD  [address]  [place]  [place] [address] on close of this encounter.

## 2022-03-29 ENCOUNTER — OFFICE VISIT (OUTPATIENT)
Dept: FAMILY MEDICINE | Facility: CLINIC | Age: 67
End: 2022-03-29
Attending: INTERNAL MEDICINE
Payer: COMMERCIAL

## 2022-03-29 VITALS — SYSTOLIC BLOOD PRESSURE: 136 MMHG | DIASTOLIC BLOOD PRESSURE: 78 MMHG

## 2022-03-29 DIAGNOSIS — L82.0 INFLAMED SEBORRHEIC KERATOSIS: Primary | ICD-10-CM

## 2022-03-29 DIAGNOSIS — L82.1 SEBORRHEIC KERATOSIS: ICD-10-CM

## 2022-03-29 PROCEDURE — 17110 DESTRUCTION B9 LES UP TO 14: CPT | Performed by: PHYSICIAN ASSISTANT

## 2022-03-29 NOTE — PATIENT INSTRUCTIONS
Cryotherapy    What is it?    Use of a very cold liquid, such as liquid nitrogen, to freeze and destroy abnormal skin cells that need to be removed    What should I expect?    Tenderness and redness    A small blister that might grow and fill with dark purple blood. There may be crusting.    More than one treatment may be needed if the lesions do not go away.    How do I care for the treated area?    Gently wash the area with your hands when bathing.    Use a thin layer of Vaseline to help with healing. You may use a Band-Aid.     The area should heal within 7-10 days and may leave behind a pink or lighter color.     Do not use an antibiotic or Neosporin ointment.     You may take acetaminophen (Tylenol) for pain.     Call your doctor if you have:    Severe pain    Signs of infection (warmth, redness, cloudy yellow drainage, and or a bad smell)    Questions or concerns    Who should I call with questions?       [facility]: [phone]       [facility]: [phone]       For urgent needs outside of business hours call the [facility] at [phone] and ask for the dermatology resident on call

## 2022-03-29 NOTE — LETTER
3/29/2022         RE: Hoda Brush  [address]  [facility] [address]        Dear Colleague,    Thank you for referring your patient, Hoda Brush, to the [facility]. Please see a copy of my visit note below.    [facility] Dermatology Note  Encounter Date: Mar 29, 2022  Office Visit     Dermatology Problem List:  1. Symptomatic seborrheic keratoses and hx of inflamed seborrheic keratoses.     ____________________________________________    Assessment & Plan:     #   Symptomatic and hx of inflamed seborrheic keratoses.   Because these are bothersome and painful to the patient will treat in the office with cryotherapy and considered medically necessary.       Procedures Performed:   - Cryotherapy procedure note, location(s): left chest and flank. After verbal consent and discussion of risks and benefits including, but not limited to, dyspigmentation/scar, blister, and pain, 10 lesion(s) was(were) treated with 1-2 mm freeze border for 1-2 cycles with liquid nitrogen. Post cryotherapy instructions were provided.      Follow-up: prn for new or changing lesions    Staff:     All risks, benefits and alternatives were discussed with patient.  Patient is in agreement and understands the assessment and plan.  All questions were answered.  Sun Screen Education was given.   Return to Clinic as needed.   Alejandra Brooks PA-C   ____________________________________________    CC: Derm Problem (keratosis on left side along bra line)    HPI:  Ms. Hoda Brush is a(n) 67 year old female who presents today as a new patient for bumps on the left side of her chest. She had a left sided mastectomy in 2014. She typically does not wear a bra in the winter time but does in the summer. The bra rubs against the growths she has developed on her left chest and flank. This causes pain, redness and swelling at times. They also become very itchy. This  started the summer of 2021 and she would like the bumps to get treated so they do not continue to cause discomfort.   Patient is otherwise feeling well, without additional skin concerns.     Labs Reviewed:  N/A    Physical Exam:  Vitals: /78   SKIN: Waist-up skin, which includes the head/face, neck, both arms, chest, back, abdomen, digits and/or nails was examined.  -There are a few light brown macules on the sun exposed areas.    - There are waxy stuck on tan to brown papules on the trunk, mainly the left chest and left lateral chest wall/ flank.   - No other lesions of concern on areas examined.     Medications:  Current Outpatient Medications   Medication     acetaminophen (TYLENOL) 500 MG tablet     calcium carbonate (OS-TAYLER) 500 MG tablet     famotidine (PEPCID) 20 MG tablet     letrozole (FEMARA) 2.5 MG tablet     melatonin 3 MG tablet     order for DME     traZODone (DESYREL) 50 MG tablet     VITAMIN D, CHOLECALCIFEROL, PO     triamcinolone (KENALOG) 0.1 % external cream     No current facility-administered medications for this visit.      Past Medical History:   Patient Active Problem List   Diagnosis     Malignant neoplasm of left female breast, unspecified estrogen receptor status, unspecified site of breast (H)     Bone metastasis (H)     Cellulitis of left upper extremity     Metastatic breast cancer (H)     Acute cholecystitis     Fatty liver     S/P mastectomy, left     Family history of malignant neoplasm of breast     Osteonecrosis, unspecified (H)     Past Medical History:   Diagnosis Date     Breast cancer metastasized to bone (H)      Lymphedema of left upper extremity        CC Laury Dee MD  [address] [address] on close of this encounter.      Again, thank you for allowing me to participate in the care of your patient.        Sincerely,        Alejandra Brooks PA-C

## 2022-03-29 NOTE — PROGRESS NOTES
Hoda is a patient from [facility] and is seen here for continuation of care for her metastatic ER+HER2- breast cancer.  She is a [employer] refugee from [facility] and was initially seen in clinic with her daughter.  The only data we have from [facility] is an English translation of her records.       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  On 02/11/2014, she was seen by an oncologist.  The clinical staging of T4b N2 M1 was noted.   Her breast cancer was on the left side. There was no right breast cancer, confirmed by the patient and her daughter, correcting a possible error in the translated records.  ER was positive in 100% of the cells, OR at 14% and HER2 was 3+ positive.  It appears that this histopathologic information is on the mastectomy specimen. She underwent an MRI for staging of presumptive bone metastases which was performed 03/02/2014.  There were skeletal metastases in the thoracic vertebrae at 12, L1, L3, L5 and S1 vertebral body, ranging in size from 0.7-3.0 cm in size.  Ischial and right femur were also involved, as well as a large iliac mass measuring about 15 cm in the uterus. 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   Radiation Oncology consultation was performed 03/11/2014, and she was given radiation in 1 dose of 6 Gy to the right iliac lesion.        With the initial diagnosis, she initiated treatment with 6 cycles of CAF neoadjuvant chemotherapy 02/14, 07/07, 03/28, 04/18/14, 05/08 and 05/29/14. She also received monthly zoledronic acid.  She then underwent a modified left mastectomy of Palacios type and left lymphadenoectomy on 06/27/2014.   Pathologic examination showed number at [facility] was [id number] showed infiltrative grade 2 ductal cancer with 6 level 1 metastatic lymph nodes and 3 level 2 metastatic lymph nodes.  The differentiation was intermediate.  The tumor was ER positive 70% of the cells, OR  positive in 40% of the cells and HER2 was 3+ by immunohistochemistry and the Ki-67 labeling index was 20%. Her staging after surgery was stage IV, pT4b N2 M1.  I don't see a biopsy of the skeletal metastases.  She then had continuation with chemotherapy with 4 cycles of Herceptin and taxane with monthly zoledronic acid.  Tamoxifen was initiated.  She then had two years of Herceptin and a decision was made not to continue further Herceptin.  She continued on zoledronic acid every 3 months. She was clinically stable. [place] then moved to the [place] new refugee from [facility].  She has now been changed to letrozole.  Denosumab has now been held for new diagnosis of osteonecrosis of the R maxilla.     History of cholecystectomy 2020.    Seen in the ED on 10/4/2021, for acute abdominal pain.  Elevated LFTs, elevated lipase.  Question of stone versus pancreatitis.  She was discharged and followed up with an outpatient gastroenterology clinic.  They recommended clear liquid diet and ERCP.        TREATMENT HISTORY:  A. Initial diagnosis with metastatic breast cancer in [facility].  Neoadjuvant CAF x 6.   B. Left mastectomy. Left axillary node dissection.  C.  Radiation in 1 dose to R iliac region.  C. Herceptin for 2 years taxane for a prescribed course then stopped, monthly zoledronic acid.  She had 2 years of Herceptin with tamoxifen added after chemotherapy.  D.  Tamoxifen alone and zoledronic acid every 3 months starting 2014.  Letrozole was started   E.  [place]  We restarted Herceptin every 3 weeks and continued tamoxifen. Bone targeted agent changed to denosumab every 9 weeks. Zometa discontinued 2017.  Tamoxifen was changed to letrozole August 2019.    F.  Xgeva discontinued 12-17-19 because of dental issues.   G.  J+J vaccine March, 2021.  H.  Was on Zometa once May 11.  Reaction with eye lid swelling. Discontinued Zometal  H.  Restart Xgeva 6-22-21.  Continuing the trastuzumab and letrozole.  Both tolerated  well.       INTERVAL HISTORY Hoda is feeling well today. She was seen by Dermatology 2 days ago with inflamed seborrheic keratoses which was treated with cryotherapy, 10 lesions treated. These are a little sore but otherwise okay. Otherwise no issues or concerns. She still has some difficulty sleeping due to waking up 2-3 times during the night to urinate. She uses melatonin or trazodone with some relief but not complete.     No side effects from treatment. No new or different pain. No lumps/bumps. No fevers/chills. No cough, SOB, CP, or edema. She is eating and drinking well. She continues to walk on the treadmill 20-30 minutes every day.       PHYSICAL EXAM:  /79   Pulse 82   Temp 97.7  F (36.5  C)   Resp 18   Wt 81.2 kg (179 lb)   SpO2 95%   BMI 31.96 kg/m      Wt Readings from Last 4 Encounters:   03/31/22 81.2 kg (179 lb)   03/10/22 80.7 kg (178 lb)   03/07/22 80.7 kg (178 lb)   02/17/22 81.1 kg (178 lb 14.4 oz)     General: Alert, oriented, pleasant, NAD  HEENT: Normocephalic, atraumatic, no icterus   Neck: No cervical or supraclavicular LAD.  Axillary: No LAD  Lungs: CTA bilaterally, normal work of breathing  Cardiac: RRR  Abdomen: Soft, nontender, nondistended. Normoactive bowel sounds. No hepatosplenomegaly, masses  Neuro: CNII-XII grossly intact  Extremities: No pedal edema        Labs  Most Recent 3 CBC's:  Recent Labs   Lab Test 03/31/22  1329 03/09/22  0808 02/17/22  1045   WBC 8.2 6.9 7.4   HGB 12.1 12.4 11.9   MCV 94 94 93    226 216    Most Recent 3 BMP's:  Recent Labs   Lab Test 03/31/22  1329 03/09/22  0808 02/17/22  1045    142 142   POTASSIUM 4.0 4.0 4.0   CHLORIDE 109 107 108   CO2 29 29 29   BUN 13 17 16   CR 0.71 0.79 0.75   ANIONGAP 4 6 5   TAYLER 8.7 9.1 8.8   GLC 76 101* 87    Most Recent 2 LFT's:  Recent Labs   Lab Test 03/31/22  1329 03/09/22  0808   AST 31 33   ALT 36 45   ALKPHOS 46 49   BILITOTAL 0.2 0.4      I reviewed the above labs today.       ASSESSMENT AND  PLAN:     1.  Hoda Brush is a 66-year-old woman with a history of ER-positive, VT-positive, HER2 positive breast cancer.  She is from [place], formally from [facility], and came to live in the [place] with [place].  The tumor is ER positive, VT positive and HER2 positive.  She had metastatic disease at the time of presentation with bone-only metastases by report.  She underwent neoadjuvant CAF, had a left mastectomy, left axillary lymph node dissection, radiation to the right hip, which included the right iliac region where she had metastatic disease.  She was initially on tamoxifen but then was switched to letrozole.  She continues on this and every 3-week trastuzumab. She has had no evidence of disease progression for the last 3 years.  We have continued Herceptin every 3 weeks as well as daily letrozole.   -- Tumor markers have been stable, will be drawn later today   - CT CAP 3/9/22 showed stable disease   -- Proceed with herceptin today. Continue letrozole   -- Repeat CT CAP in June   -- Echo due in June, scheduled     2.  Elevated LFTs, lipase, with abdominal pain- Resolved   -- Has no abdominal pain today, LFTs and bili are now normal   -- MRCP 11/4 normal other than hepatic steatosis     3. Bone health  - Xgeva was on hold due to possible ONJ. See Dr. Aguiar's note from 12/16--this was resumed on 1/6/22. Due in April.   - Continue Calcium and Vitamin D    4. Insomnia  - Continue trazodone 25mg PRN at bedtime.     Anne Lea PA-C

## 2022-03-29 NOTE — PROGRESS NOTES
[facility] Dermatology Note  Encounter Date: Mar 29, 2022  Office Visit     Dermatology Problem List:  1. Symptomatic seborrheic keratoses and hx of inflamed seborrheic keratoses.     ____________________________________________    Assessment & Plan:     #   Symptomatic and hx of inflamed seborrheic keratoses.   Because these are bothersome and painful to the patient will treat in the office with cryotherapy and considered medically necessary.       Procedures Performed:   - Cryotherapy procedure note, location(s): left chest and flank. After verbal consent and discussion of risks and benefits including, but not limited to, dyspigmentation/scar, blister, and pain, 10 lesion(s) was(were) treated with 1-2 mm freeze border for 1-2 cycles with liquid nitrogen. Post cryotherapy instructions were provided.      Follow-up: prn for new or changing lesions    Staff:     All risks, benefits and alternatives were discussed with patient.  Patient is in agreement and understands the assessment and plan.  All questions were answered.  Sun Screen Education was given.   Return to Clinic as needed.   Alejandra Brooks PA-C   ____________________________________________    CC: Derm Problem (keratosis on left side along bra line)    HPI:  Ms. Hoda Brush is a(n) 67 year old female who presents today as a new patient for bumps on the left side of her chest. She had a left sided mastectomy in 2014. She typically does not wear a bra in the winter time but does in the summer. The bra rubs against the growths she has developed on her left chest and flank. This causes pain, redness and swelling at times. They also become very itchy. This started the summer of 2021 and she would like the bumps to get treated so they do not continue to cause discomfort.   Patient is otherwise feeling well, without additional skin concerns.     Labs Reviewed:  N/A    Physical Exam:  Vitals: /78   SKIN: Waist-up skin, which  includes the head/face, neck, both arms, chest, back, abdomen, digits and/or nails was examined.  -There are a few light brown macules on the sun exposed areas.    - There are waxy stuck on tan to brown papules on the trunk, mainly the left chest and left lateral chest wall/ flank.   - No other lesions of concern on areas examined.     Medications:  Current Outpatient Medications   Medication     acetaminophen (TYLENOL) 500 MG tablet     calcium carbonate (OS-TAYLER) 500 MG tablet     famotidine (PEPCID) 20 MG tablet     letrozole (FEMARA) 2.5 MG tablet     melatonin 3 MG tablet     order for DME     traZODone (DESYREL) 50 MG tablet     VITAMIN D, CHOLECALCIFEROL, PO     triamcinolone (KENALOG) 0.1 % external cream     No current facility-administered medications for this visit.      Past Medical History:   Patient Active Problem List   Diagnosis     Malignant neoplasm of left female breast, unspecified estrogen receptor status, unspecified site of breast (H)     Bone metastasis (H)     Cellulitis of left upper extremity     Metastatic breast cancer (H)     Acute cholecystitis     Fatty liver     S/P mastectomy, left     Family history of malignant neoplasm of breast     Osteonecrosis, unspecified (H)     Past Medical History:   Diagnosis Date     Breast cancer metastasized to bone (H)      Lymphedema of left upper extremity        CC Laury Dee MD  [address] [place] [id number] on close of this encounter.

## 2022-03-31 ENCOUNTER — APPOINTMENT (OUTPATIENT)
Dept: LAB | Facility: CLINIC | Age: 67
End: 2022-03-31
Attending: INTERNAL MEDICINE
Payer: COMMERCIAL

## 2022-03-31 ENCOUNTER — ONCOLOGY VISIT (OUTPATIENT)
Dept: ONCOLOGY | Facility: CLINIC | Age: 67
End: 2022-03-31
Attending: PHYSICIAN ASSISTANT
Payer: COMMERCIAL

## 2022-03-31 ENCOUNTER — INFUSION THERAPY VISIT (OUTPATIENT)
Dept: ONCOLOGY | Facility: CLINIC | Age: 67
End: 2022-03-31
Attending: INTERNAL MEDICINE
Payer: COMMERCIAL

## 2022-03-31 VITALS
BODY MASS INDEX: 31.96 KG/M2 | RESPIRATION RATE: 18 BRPM | OXYGEN SATURATION: 95 % | HEART RATE: 82 BPM | TEMPERATURE: 97.7 F | DIASTOLIC BLOOD PRESSURE: 79 MMHG | SYSTOLIC BLOOD PRESSURE: 129 MMHG | WEIGHT: 179 LBS

## 2022-03-31 DIAGNOSIS — N32.81 OVERACTIVE BLADDER: Primary | ICD-10-CM

## 2022-03-31 DIAGNOSIS — C50.912 MALIGNANT NEOPLASM OF LEFT FEMALE BREAST, UNSPECIFIED ESTROGEN RECEPTOR STATUS, UNSPECIFIED SITE OF BREAST (H): Primary | ICD-10-CM

## 2022-03-31 LAB
ALBUMIN SERPL-MCNC: 3.3 G/DL (ref 3.4–5)
ALP SERPL-CCNC: 46 U/L (ref 40–150)
ALT SERPL W P-5'-P-CCNC: 36 U/L (ref 0–50)
ANION GAP SERPL CALCULATED.3IONS-SCNC: 4 MMOL/L (ref 3–14)
AST SERPL W P-5'-P-CCNC: 31 U/L (ref 0–45)
BASOPHILS # BLD AUTO: 0 10E3/UL (ref 0–0.2)
BASOPHILS NFR BLD AUTO: 0 %
BILIRUB SERPL-MCNC: 0.2 MG/DL (ref 0.2–1.3)
BUN SERPL-MCNC: 13 MG/DL (ref 7–30)
CALCIUM SERPL-MCNC: 8.7 MG/DL (ref 8.5–10.1)
CHLORIDE BLD-SCNC: 109 MMOL/L (ref 94–109)
CO2 SERPL-SCNC: 29 MMOL/L (ref 20–32)
CREAT SERPL-MCNC: 0.71 MG/DL (ref 0.52–1.04)
EOSINOPHIL # BLD AUTO: 0.3 10E3/UL (ref 0–0.7)
EOSINOPHIL NFR BLD AUTO: 3 %
ERYTHROCYTE [DISTWIDTH] IN BLOOD BY AUTOMATED COUNT: 13.4 % (ref 10–15)
GFR SERPL CREATININE-BSD FRML MDRD: >90 ML/MIN/1.73M2
GLUCOSE BLD-MCNC: 76 MG/DL (ref 70–99)
HCT VFR BLD AUTO: 39.2 % (ref 35–47)
HGB BLD-MCNC: 12.1 G/DL (ref 11.7–15.7)
IMM GRANULOCYTES # BLD: 0 10E3/UL
IMM GRANULOCYTES NFR BLD: 0 %
LYMPHOCYTES # BLD AUTO: 3.2 10E3/UL (ref 0.8–5.3)
LYMPHOCYTES NFR BLD AUTO: 39 %
MCH RBC QN AUTO: 29 PG (ref 26.5–33)
MCHC RBC AUTO-ENTMCNC: 30.9 G/DL (ref 31.5–36.5)
MCV RBC AUTO: 94 FL (ref 78–100)
MONOCYTES # BLD AUTO: 0.6 10E3/UL (ref 0–1.3)
MONOCYTES NFR BLD AUTO: 8 %
NEUTROPHILS # BLD AUTO: 4.1 10E3/UL (ref 1.6–8.3)
NEUTROPHILS NFR BLD AUTO: 50 %
NRBC # BLD AUTO: 0 10E3/UL
NRBC BLD AUTO-RTO: 0 /100
PLATELET # BLD AUTO: 233 10E3/UL (ref 150–450)
POTASSIUM BLD-SCNC: 4 MMOL/L (ref 3.4–5.3)
PROT SERPL-MCNC: 7.7 G/DL (ref 6.8–8.8)
RBC # BLD AUTO: 4.17 10E6/UL (ref 3.8–5.2)
SODIUM SERPL-SCNC: 142 MMOL/L (ref 133–144)
WBC # BLD AUTO: 8.2 10E3/UL (ref 4–11)

## 2022-03-31 PROCEDURE — 86300 IMMUNOASSAY TUMOR CA 15-3: CPT | Performed by: INTERNAL MEDICINE

## 2022-03-31 PROCEDURE — 80053 COMPREHEN METABOLIC PANEL: CPT | Performed by: PHYSICIAN ASSISTANT

## 2022-03-31 PROCEDURE — 250N000011 HC RX IP 250 OP 636: Performed by: PHYSICIAN ASSISTANT

## 2022-03-31 PROCEDURE — 82378 CARCINOEMBRYONIC ANTIGEN: CPT | Performed by: INTERNAL MEDICINE

## 2022-03-31 PROCEDURE — 85025 COMPLETE CBC W/AUTO DIFF WBC: CPT | Performed by: PHYSICIAN ASSISTANT

## 2022-03-31 PROCEDURE — 36591 DRAW BLOOD OFF VENOUS DEVICE: CPT

## 2022-03-31 PROCEDURE — G0463 HOSPITAL OUTPT CLINIC VISIT: HCPCS

## 2022-03-31 PROCEDURE — 99214 OFFICE O/P EST MOD 30 MIN: CPT | Performed by: PHYSICIAN ASSISTANT

## 2022-03-31 PROCEDURE — 258N000003 HC RX IP 258 OP 636: Performed by: PHYSICIAN ASSISTANT

## 2022-03-31 PROCEDURE — 96413 CHEMO IV INFUSION 1 HR: CPT

## 2022-03-31 RX ORDER — MEPERIDINE HYDROCHLORIDE 25 MG/ML
25 INJECTION INTRAMUSCULAR; INTRAVENOUS; SUBCUTANEOUS EVERY 30 MIN PRN
Status: CANCELLED | OUTPATIENT
Start: 2022-03-31

## 2022-03-31 RX ORDER — DIPHENHYDRAMINE HYDROCHLORIDE 50 MG/ML
50 INJECTION INTRAMUSCULAR; INTRAVENOUS
Status: CANCELLED
Start: 2022-03-31

## 2022-03-31 RX ORDER — ALBUTEROL SULFATE 0.83 MG/ML
2.5 SOLUTION RESPIRATORY (INHALATION)
Status: CANCELLED | OUTPATIENT
Start: 2022-03-31

## 2022-03-31 RX ORDER — HEPARIN SODIUM (PORCINE) LOCK FLUSH IV SOLN 100 UNIT/ML 100 UNIT/ML
5 SOLUTION INTRAVENOUS EVERY 8 HOURS
Status: DISCONTINUED | OUTPATIENT
Start: 2022-03-31 | End: 2022-03-31 | Stop reason: HOSPADM

## 2022-03-31 RX ORDER — HEPARIN SODIUM (PORCINE) LOCK FLUSH IV SOLN 100 UNIT/ML 100 UNIT/ML
5 SOLUTION INTRAVENOUS EVERY 8 HOURS
Status: CANCELLED | OUTPATIENT
Start: 2022-03-31

## 2022-03-31 RX ORDER — HEPARIN SODIUM (PORCINE) LOCK FLUSH IV SOLN 100 UNIT/ML 100 UNIT/ML
5 SOLUTION INTRAVENOUS EVERY 8 HOURS
Status: DISCONTINUED | OUTPATIENT
Start: 2022-03-31 | End: 2022-04-06 | Stop reason: HOSPADM

## 2022-03-31 RX ORDER — EPINEPHRINE 1 MG/ML
0.3 INJECTION, SOLUTION INTRAMUSCULAR; SUBCUTANEOUS EVERY 5 MIN PRN
Status: CANCELLED | OUTPATIENT
Start: 2022-03-31

## 2022-03-31 RX ORDER — ACETAMINOPHEN 325 MG/1
650 TABLET ORAL
Status: CANCELLED | OUTPATIENT
Start: 2022-03-31

## 2022-03-31 RX ORDER — DIPHENHYDRAMINE HCL 25 MG
50 CAPSULE ORAL ONCE
Status: CANCELLED
Start: 2022-03-31

## 2022-03-31 RX ORDER — OXYBUTYNIN CHLORIDE 5 MG/1
5 TABLET ORAL AT BEDTIME
Qty: 90 TABLET | Refills: 1 | Status: SHIPPED | OUTPATIENT
Start: 2022-03-31 | End: 2022-09-08

## 2022-03-31 RX ORDER — LORAZEPAM 2 MG/ML
0.5 INJECTION INTRAMUSCULAR EVERY 4 HOURS PRN
Status: CANCELLED
Start: 2022-03-31

## 2022-03-31 RX ORDER — ALBUTEROL SULFATE 90 UG/1
1-2 AEROSOL, METERED RESPIRATORY (INHALATION)
Status: CANCELLED
Start: 2022-03-31

## 2022-03-31 RX ORDER — EPINEPHRINE 0.3 MG/.3ML
0.3 INJECTION SUBCUTANEOUS EVERY 5 MIN PRN
Status: CANCELLED | OUTPATIENT
Start: 2022-03-31

## 2022-03-31 RX ORDER — METHYLPREDNISOLONE SODIUM SUCCINATE 125 MG/2ML
125 INJECTION, POWDER, LYOPHILIZED, FOR SOLUTION INTRAMUSCULAR; INTRAVENOUS
Status: CANCELLED
Start: 2022-03-31

## 2022-03-31 RX ORDER — SODIUM CHLORIDE 9 MG/ML
1000 INJECTION, SOLUTION INTRAVENOUS CONTINUOUS PRN
Status: CANCELLED
Start: 2022-03-31

## 2022-03-31 RX ADMIN — Medication 5 ML: at 13:29

## 2022-03-31 RX ADMIN — Medication 5 ML: at 15:19

## 2022-03-31 RX ADMIN — TRASTUZUMAB 450 MG: 150 INJECTION, POWDER, LYOPHILIZED, FOR SOLUTION INTRAVENOUS at 15:15

## 2022-03-31 ASSESSMENT — PAIN SCALES - GENERAL: PAINLEVEL: NO PAIN (0)

## 2022-03-31 NOTE — NURSING NOTE
"Oncology Rooming Note    March 31, 2022 1:40 PM   Hoda Brush is a 67 year old female who presents for:    Chief Complaint   Patient presents with     Port Draw     poweer needle. heparin locked,v itals checked     Oncology Clinic Visit     Breast Cancer      Initial Vitals: /79   Pulse 82   Temp 97.7  F (36.5  C)   Resp 18   Wt 81.2 kg (179 lb)   SpO2 95%   BMI 31.96 kg/m   Estimated body mass index is 31.96 kg/m  as calculated from the following:    Height as of 3/7/22: 1.594 m (5' 2.75\").    Weight as of this encounter: 81.2 kg (179 lb). Body surface area is 1.9 meters squared.  No Pain (0) Comment: Data Unavailable   No LMP recorded. Patient is postmenopausal.  Allergies reviewed: Yes  Medications reviewed: Yes    Medications: Medication refills not needed today.  Pharmacy name entered into [employer]: [id number] - [place], [place] - [address] AT [address] [place] [address]    Clinical concerns: None       Patricia Devries LPN            "

## 2022-03-31 NOTE — PROGRESS NOTES
Infusion Nursing Note:  Hoda Brush presents today for Cycle 78 Day 1 trastuzumab   Patient seen by provider today: Yes: HUI Lea PA-C   present during visit today: Not Applicable.    Note:     Herceptin stopped @ 1545    Intravenous Access:  Implanted Port.    Treatment Conditions:  Lab Results   Component Value Date    HGB 12.1 03/31/2022    WBC 8.2 03/31/2022    ANEU 3.1 06/22/2021    ANEUTAUTO 4.1 03/31/2022     03/31/2022      Lab Results   Component Value Date     03/31/2022    POTASSIUM 4.0 03/31/2022    CR 0.71 03/31/2022    TAYLER 8.7 03/31/2022    BILITOTAL 0.2 03/31/2022    ALBUMIN 3.3 (L) 03/31/2022    ALT 36 03/31/2022    AST 31 03/31/2022     ECHO done 3/9/2022- WNL    Post Infusion Assessment:  Patient tolerated infusion without incident.  Blood return noted pre and post infusion.  No evidence of extravasations.  Access discontinued per protocol.       Discharge Plan:   Patient declined prescription refills.  Discharge instructions reviewed with: Patient.  Copy of AVS reviewed with patient and/or family.  Patient will return 4/21/2022 for next appointment.  Departure Mode: Ambulatory.      Jacque Martino RN

## 2022-03-31 NOTE — LETTER
3/31/2022         RE: Hoda Brush  [address]  [facility] [address]          Hoda is a patient from [facility] and is seen here for continuation of care for her metastatic ER+HER2- breast cancer.  She is a [employer] refugee from [facility] and was initially seen in clinic with her daughter.  The only data we have from [facility] is an English translation of her records.       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  On 02/11/2014, she was seen by an oncologist.  The clinical staging of T4b N2 M1 was noted.   Her breast cancer was on the left side. There was no right breast cancer, confirmed by the patient and her daughter, correcting a possible error in the translated records.  ER was positive in 100% of the cells, NJ at 14% and HER2 was 3+ positive.  It appears that this histopathologic information is on the mastectomy specimen. She underwent an MRI for staging of presumptive bone metastases which was performed 03/02/2014.  There were skeletal metastases in the thoracic vertebrae at 12, L1, L3, L5 and S1 vertebral body, ranging in size from 0.7-3.0 cm in size.  Ischial and right femur were also involved, as well as a large iliac mass measuring about 15 cm in the uterus. 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   Radiation Oncology consultation was performed 03/11/2014, and she was given radiation in 1 dose of 6 Gy to the right iliac lesion.        With the initial diagnosis, she initiated treatment with 6 cycles of CAF neoadjuvant chemotherapy 02/14, 07/07, 03/28, 04/18/14, 05/08 and 05/29/14. She also received monthly zoledronic acid.  She then underwent a modified left mastectomy of Palacios type and left lymphadenoectomy on 06/27/2014.   Pathologic examination showed number at [facility] was [id number] showed infiltrative grade 2 ductal cancer with 6 level 1 metastatic lymph nodes and 3 level 2 metastatic  lymph nodes.  The differentiation was intermediate.  The tumor was ER positive 70% of the cells, NM positive in 40% of the cells and HER2 was 3+ by immunohistochemistry and the Ki-67 labeling index was 20%. Her staging after surgery was stage IV, pT4b N2 M1.  I don't see a biopsy of the skeletal metastases.  She then had continuation with chemotherapy with 4 cycles of Herceptin and taxane with monthly zoledronic acid.  Tamoxifen was initiated.  She then had two years of Herceptin and a decision was made not to continue further Herceptin.  She continued on zoledronic acid every 3 months. She was clinically stable. She then moved to the [place] new refugee from [facility].  She has now been changed to letrozole.  Denosumab has now been held for new diagnosis of osteonecrosis of the R maxilla.     History of cholecystectomy 2020.    Seen in the ED on 10/4/2021, for acute abdominal pain.  Elevated LFTs, elevated lipase.  Question of stone versus pancreatitis.  She was discharged and followed up with an outpatient gastroenterology clinic.  They recommended clear liquid diet and ERCP.        TREATMENT HISTORY:  A. Initial diagnosis with metastatic breast cancer in [facility].  Neoadjuvant CAF x 6.   B. Left mastectomy. Left axillary node dissection.  C.  Radiation in 1 dose to R iliac region.  C. Herceptin for 2 years taxane for a prescribed course then stopped, monthly zoledronic acid.  She had 2 years of Herceptin with tamoxifen added after chemotherapy.  D.  Tamoxifen alone and zoledronic acid every 3 months starting 2014.  Letrozole was started   E.  [place]  We restarted Herceptin every 3 weeks and continued tamoxifen. Bone targeted agent changed to denosumab every 9 weeks. Zometa discontinued 2017.  Tamoxifen was changed to letrozole August 2019.    F.  Xgeva discontinued 12-17-19 because of dental issues.   G.  J+J vaccine March, 2021.  H.  Was on Zometa once May 11.  Reaction with eye lid swelling.  Discontinued Zometal  H.  Restart Xgeva 6-22-21.  Continuing the trastuzumab and letrozole.  Both tolerated well.       INTERVAL HISTORY Hoda is feeling well today. She was seen by Dermatology 2 days ago with inflamed seborrheic keratoses which was treated with cryotherapy, 10 lesions treated. These are a little sore but otherwise okay. Otherwise no issues or concerns. She still has some difficulty sleeping due to waking up 2-3 times during the night to urinate. She uses melatonin or trazodone with some relief but not complete.     No side effects from treatment. No new or different pain. No lumps/bumps. No fevers/chills. No cough, SOB, CP, or edema. She is eating and drinking well. She continues to walk on the treadmill 20-30 minutes every day.       PHYSICAL EXAM:  /79   Pulse 82   Temp 97.7  F (36.5  C)   Resp 18   Wt 81.2 kg (179 lb)   SpO2 95%   BMI 31.96 kg/m      Wt Readings from Last 4 Encounters:   03/31/22 81.2 kg (179 lb)   03/10/22 80.7 kg (178 lb)   03/07/22 80.7 kg (178 lb)   02/17/22 81.1 kg (178 lb 14.4 oz)     General: Alert, oriented, pleasant, NAD  HEENT: Normocephalic, atraumatic, no icterus   Neck: No cervical or supraclavicular LAD.  Axillary: No LAD  Lungs: CTA bilaterally, normal work of breathing  Cardiac: RRR  Abdomen: Soft, nontender, nondistended. Normoactive bowel sounds. No hepatosplenomegaly, masses  Neuro: CNII-XII grossly intact  Extremities: No pedal edema        Labs  Most Recent 3 CBC's:  Recent Labs   Lab Test 03/31/22  1329 03/09/22  0808 02/17/22  1045   WBC 8.2 6.9 7.4   HGB 12.1 12.4 11.9   MCV 94 94 93    226 216    Most Recent 3 BMP's:  Recent Labs   Lab Test 03/31/22  1329 03/09/22  0808 02/17/22  1045    142 142   POTASSIUM 4.0 4.0 4.0   CHLORIDE 109 107 108   CO2 29 29 29   BUN 13 17 16   CR 0.71 0.79 0.75   ANIONGAP 4 6 5   TAYLER 8.7 9.1 8.8   GLC 76 101* 87    Most Recent 2 LFT's:  Recent Labs   Lab Test 03/31/22  1329 03/09/22  0808   AST 31  33   ALT 36 45   ALKPHOS 46 49   BILITOTAL 0.2 0.4      I reviewed the above labs today.       ASSESSMENT AND PLAN:     1.  Hoda Brush is a 66-year-old woman with a history of ER-positive, MN-positive, HER2 positive breast cancer.  She is from [facility], formally from [facility], and came to live in the [place] with [place].  The tumor is ER positive, MN positive and HER2 positive.  She had metastatic disease at the time of presentation with bone-only metastases by report.  She underwent neoadjuvant CAF, had a left mastectomy, left axillary lymph node dissection, radiation to the right hip, which included the right iliac region where she had metastatic disease.  She was initially on tamoxifen but then was switched to letrozole.  She continues on this and every 3-week trastuzumab. She has had no evidence of disease progression for the last 3 years.  We have continued Herceptin every 3 weeks as well as daily letrozole.   -- Tumor markers have been stable, will be drawn later today   - CT CAP 3/9/22 showed stable disease   -- Proceed with herceptin today. Continue letrozole   -- Repeat CT CAP in June   -- Echo due in June, scheduled     2.  Elevated LFTs, lipase, with abdominal pain- Resolved   -- Has no abdominal pain today, LFTs and bili are now normal   -- MRCP 11/4 normal other than hepatic steatosis     3. Bone health  - Xgeva was on hold due to possible ONJ. See Dr. Aguiar's note from 12/16--this was resumed on 1/6/22. Due in April.   - Continue Calcium and Vitamin D    4. Insomnia  - Continue trazodone 25mg PRN at bedtime.     JUSTINE Crockett PA-C

## 2022-03-31 NOTE — NURSING NOTE
Chief Complaint   Patient presents with     Port Draw     poweer needle. heparin locked,manny itals checked     Pia Schneider RN on 3/31/2022 at 1:31 PM

## 2022-04-20 NOTE — PROGRESS NOTES
Hoda is a patient from [facility] and is seen here for continuation of care for her metastatic ER+HER2- breast cancer.  She is a [employer] refugee from [facility] and was initially seen in clinic with her daughter.  The only data we have from [facility] is an English translation of her records.       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  On 02/11/2014, she was seen by an oncologist.  The clinical staging of T4b N2 M1 was noted.   Her breast cancer was on the left side. There was no right breast cancer, confirmed by the patient and her daughter, correcting a possible error in the translated records.  ER was positive in 100% of the cells, MO at 14% and HER2 was 3+ positive.  It appears that this histopathologic information is on the mastectomy specimen. She underwent an MRI for staging of presumptive bone metastases which was performed 03/02/2014.  There were skeletal metastases in the thoracic vertebrae at 12, L1, L3, L5 and S1 vertebral body, ranging in size from 0.7-3.0 cm in size.  Ischial and right femur were also involved, as well as a large iliac mass measuring about 15 cm in the uterus. 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   Radiation Oncology consultation was performed 03/11/2014, and she was given radiation in 1 dose of 6 Gy to the right iliac lesion.        With the initial diagnosis, she initiated treatment with 6 cycles of CAF neoadjuvant chemotherapy 02/14, 07/07, 03/28, 04/18/14, 05/08 and 05/29/14. She also received monthly zoledronic acid.  She then underwent a modified left mastectomy of Palacios type and left lymphadenoectomy on 06/27/2014.   Pathologic examination showed number at [facility] was [id number] showed infiltrative grade 2 ductal cancer with 6 level 1 metastatic lymph nodes and 3 level 2 metastatic lymph nodes.  The differentiation was intermediate.  The tumor was ER positive 70% of the cells, MO  positive in 40% of the cells and HER2 was 3+ by immunohistochemistry and the Ki-67 labeling index was 20%. Her staging after surgery was stage IV, pT4b N2 M1.  I don't see a biopsy of the skeletal metastases.  She then had continuation with chemotherapy with 4 cycles of Herceptin and taxane with monthly zoledronic acid.  Tamoxifen was initiated.  She then had two years of Herceptin and a decision was made not to continue further Herceptin.  She continued on zoledronic acid every 3 months. She was clinically stable. She then moved to the [place] as new refugee from [facility].  She has now been changed to letrozole.  Denosumab has now been held for new diagnosis of osteonecrosis of the R maxilla.     History of cholecystectomy 2020.    Seen in the ED on 10/4/2021, for acute abdominal pain.  Elevated LFTs, elevated lipase.  Question of stone versus pancreatitis.  She was discharged and followed up with an outpatient gastroenterology clinic.  They recommended clear liquid diet and ERCP.        TREATMENT HISTORY:  A. Initial diagnosis with metastatic breast cancer in [facility].  Neoadjuvant CAF x 6.   B. Left mastectomy. Left axillary node dissection.  C.  Radiation in 1 dose to R iliac region.  C. Herceptin for 2 years taxane for a prescribed course then stopped, monthly zoledronic acid.  She had 2 years of Herceptin with tamoxifen added after chemotherapy.  D.  Tamoxifen alone and zoledronic acid every 3 months starting 2014.  Letrozole was started   E[place]  [place]  We restarted Herceptin every 3 weeks and continued tamoxifen. Bone targeted agent changed to denosumab every 9 weeks. Zometa discontinued 2017.  Tamoxifen was changed to letrozole August 2019.    F.  Xgeva discontinued 12-17-19 because of dental issues.   G.  J+J vaccine March, 2021.  H.  Was on Zometa once May 11.  Reaction with eye lid swelling. Discontinued Zometal  H.  Restart Xgeva 6-22-21.  Continuing the trastuzumab and letrozole.  Both tolerated  well.       INTERVAL HISTORY   Hoda is feeling well. She notes since starting oxybutynin she is not getting up as much to urinate, and she is sleeping better. She has more energy during the day. She has been walking on nice days otherwise has a treadmill and has been walking. She continues to attend English classes four days per week, 3 hours per class. She has moved up to advanced classes. She is planning to apply for citizenship this summer.     She denies any fevers/chills or infectious concerns. No cough, SOB, or edema. No new or different pain, lumps/bumps. Appetite and energy are WNL.       PHYSICAL EXAM:  /66   Pulse 75   Temp 98.1  F (36.7  C)   Resp 16   Wt 81.3 kg (179 lb 4.8 oz)   SpO2 98%   BMI 32.02 kg/m    Wt Readings from Last 4 Encounters:   04/21/22 81.3 kg (179 lb 4.8 oz)   03/31/22 81.2 kg (179 lb)   03/10/22 80.7 kg (178 lb)   03/07/22 80.7 kg (178 lb)     General: Alert, oriented, pleasant, NAD  HEENT: Normocephalic, atraumatic, no icterus   Neck: No cervical or supraclavicular LAD.  Axillary: No LAD  Lungs: CTA bilaterally, normal work of breathing  Cardiac: RRR  Abdomen: Soft, nontender, nondistended. Normoactive bowel sounds. No hepatosplenomegaly, masses  Neuro: CNII-XII grossly intact  Extremities: No pedal edema        Labs  Most Recent 3 CBC's:  Recent Labs   Lab Test 04/21/22  1230 03/31/22  1329 03/09/22  0808   WBC 7.2 8.2 6.9   HGB 11.8 12.1 12.4   MCV 93 94 94    233 226    Most Recent 3 BMP's:  Recent Labs   Lab Test 04/21/22  1230 03/31/22  1329 03/09/22  0808    142 142   POTASSIUM 3.9 4.0 4.0   CHLORIDE 109 109 107   CO2 29 29 29   BUN 13 13 17   CR 0.75 0.71 0.79   ANIONGAP 6 4 6   TAYLER 8.9 8.7 9.1   * 76 101*    Most Recent 2 LFT's:  Recent Labs   Lab Test 04/21/22  1230 03/31/22  1329   AST 31 31   ALT 42 36   ALKPHOS 50 46   BILITOTAL 0.2 0.2      I reviewed the above labs today.       ASSESSMENT AND PLAN:     1.  Hoda Brush is a  66-year-old woman with a history of ER-positive, VT-positive, HER2 positive breast cancer.  She is from [facility], formally from [facility], and came to live in the [place] with [place].  The tumor is ER positive, VT positive and HER2 positive.  She had metastatic disease at the time of presentation with bone-only metastases by report.  She underwent neoadjuvant CAF, had a left mastectomy, left axillary lymph node dissection, radiation to the right hip, which included the right iliac region where she had metastatic disease.  She was initially on tamoxifen but then was switched to letrozole.  She continues on this and every 3-week trastuzumab. She has had no evidence of disease progression for the last 3 years.  We have continued Herceptin every 3 weeks as well as daily letrozole.   -- Tumor markers have been stable, will be drawn later today   - CT CAP 3/9/22 showed stable disease   -- Proceed with herceptin today. Continue letrozole   -- Repeat CT CAP in June   -- Echo due in June, scheduled     2.  Elevated LFTs, lipase, with abdominal pain- Resolved   -- Has no abdominal pain today, LFTs and bili are now normal   -- MRCP 11/4 normal other than hepatic steatosis     3. Bone health  - Xgeva was on hold due to possible ONJ. See Dr. Aguiar's note from 12/16--this was resumed on 1/6/22. Due in May. To be given every 3 months--this was confirmed with Dr. Aguiar.   - Continue Calcium and Vitamin D    4. Insomnia/overactive bladder   - Improved on oxybutynin 5 mg at bedtime.     Anne Lea PA-C

## 2022-04-21 ENCOUNTER — INFUSION THERAPY VISIT (OUTPATIENT)
Dept: ONCOLOGY | Facility: CLINIC | Age: 67
End: 2022-04-21
Attending: PHYSICIAN ASSISTANT
Payer: COMMERCIAL

## 2022-04-21 ENCOUNTER — APPOINTMENT (OUTPATIENT)
Dept: LAB | Facility: CLINIC | Age: 67
End: 2022-04-21
Attending: PHYSICIAN ASSISTANT
Payer: COMMERCIAL

## 2022-04-21 VITALS
OXYGEN SATURATION: 98 % | DIASTOLIC BLOOD PRESSURE: 66 MMHG | HEART RATE: 75 BPM | SYSTOLIC BLOOD PRESSURE: 114 MMHG | TEMPERATURE: 98.1 F | RESPIRATION RATE: 16 BRPM | WEIGHT: 179.3 LBS | BODY MASS INDEX: 32.02 KG/M2

## 2022-04-21 DIAGNOSIS — C50.912 MALIGNANT NEOPLASM OF LEFT FEMALE BREAST, UNSPECIFIED ESTROGEN RECEPTOR STATUS, UNSPECIFIED SITE OF BREAST (H): Primary | ICD-10-CM

## 2022-04-21 DIAGNOSIS — C50.919 METASTATIC BREAST CANCER: ICD-10-CM

## 2022-04-21 LAB
ALBUMIN SERPL-MCNC: 3.2 G/DL (ref 3.4–5)
ALP SERPL-CCNC: 50 U/L (ref 40–150)
ALT SERPL W P-5'-P-CCNC: 42 U/L (ref 0–50)
ANION GAP SERPL CALCULATED.3IONS-SCNC: 6 MMOL/L (ref 3–14)
AST SERPL W P-5'-P-CCNC: 31 U/L (ref 0–45)
BASOPHILS # BLD AUTO: 0 10E3/UL (ref 0–0.2)
BASOPHILS NFR BLD AUTO: 0 %
BILIRUB SERPL-MCNC: 0.2 MG/DL (ref 0.2–1.3)
BUN SERPL-MCNC: 13 MG/DL (ref 7–30)
CALCIUM SERPL-MCNC: 8.9 MG/DL (ref 8.5–10.1)
CANCER AG27-29 SERPL-ACNC: <5 U/ML (ref 0–39)
CEA SERPL-MCNC: <=0.5 UG/L (ref 0–2.5)
CHLORIDE BLD-SCNC: 109 MMOL/L (ref 94–109)
CO2 SERPL-SCNC: 29 MMOL/L (ref 20–32)
CREAT SERPL-MCNC: 0.75 MG/DL (ref 0.52–1.04)
EOSINOPHIL # BLD AUTO: 0.3 10E3/UL (ref 0–0.7)
EOSINOPHIL NFR BLD AUTO: 4 %
ERYTHROCYTE [DISTWIDTH] IN BLOOD BY AUTOMATED COUNT: 13.7 % (ref 10–15)
GFR SERPL CREATININE-BSD FRML MDRD: 87 ML/MIN/1.73M2
GLUCOSE BLD-MCNC: 106 MG/DL (ref 70–99)
HCT VFR BLD AUTO: 38.4 % (ref 35–47)
HGB BLD-MCNC: 11.8 G/DL (ref 11.7–15.7)
IMM GRANULOCYTES # BLD: 0 10E3/UL
IMM GRANULOCYTES NFR BLD: 0 %
LYMPHOCYTES # BLD AUTO: 2.8 10E3/UL (ref 0.8–5.3)
LYMPHOCYTES NFR BLD AUTO: 38 %
MCH RBC QN AUTO: 28.6 PG (ref 26.5–33)
MCHC RBC AUTO-ENTMCNC: 30.7 G/DL (ref 31.5–36.5)
MCV RBC AUTO: 93 FL (ref 78–100)
MONOCYTES # BLD AUTO: 0.5 10E3/UL (ref 0–1.3)
MONOCYTES NFR BLD AUTO: 8 %
NEUTROPHILS # BLD AUTO: 3.6 10E3/UL (ref 1.6–8.3)
NEUTROPHILS NFR BLD AUTO: 50 %
NRBC # BLD AUTO: 0 10E3/UL
NRBC BLD AUTO-RTO: 0 /100
PLATELET # BLD AUTO: 202 10E3/UL (ref 150–450)
POTASSIUM BLD-SCNC: 3.9 MMOL/L (ref 3.4–5.3)
PROT SERPL-MCNC: 7.5 G/DL (ref 6.8–8.8)
RBC # BLD AUTO: 4.12 10E6/UL (ref 3.8–5.2)
SODIUM SERPL-SCNC: 144 MMOL/L (ref 133–144)
WBC # BLD AUTO: 7.2 10E3/UL (ref 4–11)

## 2022-04-21 PROCEDURE — 250N000011 HC RX IP 250 OP 636: Performed by: PHYSICIAN ASSISTANT

## 2022-04-21 PROCEDURE — 82378 CARCINOEMBRYONIC ANTIGEN: CPT | Performed by: PHYSICIAN ASSISTANT

## 2022-04-21 PROCEDURE — 258N000003 HC RX IP 258 OP 636: Performed by: PHYSICIAN ASSISTANT

## 2022-04-21 PROCEDURE — 80053 COMPREHEN METABOLIC PANEL: CPT | Performed by: PHYSICIAN ASSISTANT

## 2022-04-21 PROCEDURE — G0463 HOSPITAL OUTPT CLINIC VISIT: HCPCS

## 2022-04-21 PROCEDURE — 85004 AUTOMATED DIFF WBC COUNT: CPT | Performed by: PHYSICIAN ASSISTANT

## 2022-04-21 PROCEDURE — 36415 COLL VENOUS BLD VENIPUNCTURE: CPT | Performed by: PHYSICIAN ASSISTANT

## 2022-04-21 PROCEDURE — 86300 IMMUNOASSAY TUMOR CA 15-3: CPT | Performed by: PHYSICIAN ASSISTANT

## 2022-04-21 PROCEDURE — 96413 CHEMO IV INFUSION 1 HR: CPT

## 2022-04-21 PROCEDURE — 82040 ASSAY OF SERUM ALBUMIN: CPT | Performed by: PHYSICIAN ASSISTANT

## 2022-04-21 PROCEDURE — 99214 OFFICE O/P EST MOD 30 MIN: CPT | Performed by: PHYSICIAN ASSISTANT

## 2022-04-21 RX ORDER — ALBUTEROL SULFATE 0.83 MG/ML
2.5 SOLUTION RESPIRATORY (INHALATION)
Status: CANCELLED | OUTPATIENT
Start: 2022-04-21

## 2022-04-21 RX ORDER — HEPARIN SODIUM (PORCINE) LOCK FLUSH IV SOLN 100 UNIT/ML 100 UNIT/ML
5 SOLUTION INTRAVENOUS EVERY 8 HOURS
Status: CANCELLED | OUTPATIENT
Start: 2022-04-21

## 2022-04-21 RX ORDER — HEPARIN SODIUM (PORCINE) LOCK FLUSH IV SOLN 100 UNIT/ML 100 UNIT/ML
5 SOLUTION INTRAVENOUS EVERY 8 HOURS
Status: DISCONTINUED | OUTPATIENT
Start: 2022-04-21 | End: 2022-04-21 | Stop reason: HOSPADM

## 2022-04-21 RX ORDER — ACETAMINOPHEN 325 MG/1
650 TABLET ORAL
Status: CANCELLED | OUTPATIENT
Start: 2022-04-21

## 2022-04-21 RX ORDER — DIPHENHYDRAMINE HCL 25 MG
50 CAPSULE ORAL ONCE
Status: CANCELLED
Start: 2022-04-21

## 2022-04-21 RX ORDER — EPINEPHRINE 1 MG/ML
0.3 INJECTION, SOLUTION INTRAMUSCULAR; SUBCUTANEOUS EVERY 5 MIN PRN
Status: CANCELLED | OUTPATIENT
Start: 2022-04-21

## 2022-04-21 RX ORDER — ALBUTEROL SULFATE 90 UG/1
1-2 AEROSOL, METERED RESPIRATORY (INHALATION)
Status: CANCELLED
Start: 2022-04-21

## 2022-04-21 RX ORDER — MEPERIDINE HYDROCHLORIDE 25 MG/ML
25 INJECTION INTRAMUSCULAR; INTRAVENOUS; SUBCUTANEOUS EVERY 30 MIN PRN
Status: CANCELLED | OUTPATIENT
Start: 2022-04-21

## 2022-04-21 RX ORDER — EPINEPHRINE 0.3 MG/.3ML
0.3 INJECTION SUBCUTANEOUS EVERY 5 MIN PRN
Status: CANCELLED | OUTPATIENT
Start: 2022-04-21

## 2022-04-21 RX ORDER — METHYLPREDNISOLONE SODIUM SUCCINATE 125 MG/2ML
125 INJECTION, POWDER, LYOPHILIZED, FOR SOLUTION INTRAMUSCULAR; INTRAVENOUS
Status: CANCELLED
Start: 2022-04-21

## 2022-04-21 RX ORDER — LORAZEPAM 2 MG/ML
0.5 INJECTION INTRAMUSCULAR EVERY 4 HOURS PRN
Status: CANCELLED
Start: 2022-04-21

## 2022-04-21 RX ORDER — SODIUM CHLORIDE 9 MG/ML
1000 INJECTION, SOLUTION INTRAVENOUS CONTINUOUS PRN
Status: CANCELLED
Start: 2022-04-21

## 2022-04-21 RX ORDER — HEPARIN SODIUM (PORCINE) LOCK FLUSH IV SOLN 100 UNIT/ML 100 UNIT/ML
5 SOLUTION INTRAVENOUS ONCE
Status: COMPLETED | OUTPATIENT
Start: 2022-04-21 | End: 2022-04-21

## 2022-04-21 RX ORDER — DIPHENHYDRAMINE HYDROCHLORIDE 50 MG/ML
50 INJECTION INTRAMUSCULAR; INTRAVENOUS
Status: CANCELLED
Start: 2022-04-21

## 2022-04-21 RX ADMIN — SODIUM CHLORIDE, PRESERVATIVE FREE 5 ML: 5 INJECTION INTRAVENOUS at 12:35

## 2022-04-21 RX ADMIN — TRASTUZUMAB 450 MG: 150 INJECTION, POWDER, LYOPHILIZED, FOR SOLUTION INTRAVENOUS at 14:08

## 2022-04-21 RX ADMIN — Medication 5 ML: at 14:44

## 2022-04-21 RX ADMIN — SODIUM CHLORIDE 250 ML: 9 INJECTION, SOLUTION INTRAVENOUS at 14:08

## 2022-04-21 ASSESSMENT — PAIN SCALES - GENERAL: PAINLEVEL: NO PAIN (0)

## 2022-04-21 NOTE — NURSING NOTE
"Oncology Rooming Note    April 21, 2022 1:00 PM   Hoda Brush is a 67 year old female who presents for:    Chief Complaint   Patient presents with     Port Draw     Labs drawn by RN via port, vitals taken.     Oncology Clinic Visit     Metastatic breast cancer (H)     Initial Vitals: /66   Pulse 75   Temp 98.1  F (36.7  C)   Resp 16   Wt 81.3 kg (179 lb 4.8 oz)   SpO2 98%   BMI 32.02 kg/m   Estimated body mass index is 32.02 kg/m  as calculated from the following:    Height as of 3/7/22: 1.594 m (5' 2.75\").    Weight as of this encounter: 81.3 kg (179 lb 4.8 oz). Body surface area is 1.9 meters squared.  No Pain (0) Comment: Data Unavailable   No LMP recorded. Patient is postmenopausal.  Allergies reviewed: Yes  Medications reviewed: Yes    Medications: Medication refills not needed today.  Pharmacy name entered into [employer]: [id number] [place], [place] - [address]    Clinical concerns: No major changes reported. No  used today- waiver signed.        Carly Plummer LPN April 21, 2022 1:01 PM\                "

## 2022-04-21 NOTE — PROGRESS NOTES
Infusion Nursing Note:  Hoda Brush presents today for Day 1 Cycle 79 Herceptin.    Patient seen by provider : Yes: SEMAJ Crockett  Date of visit: 4/21/22      present during visit today: Yes, Language: Kazakh.     Note: Hoda arrives to infusion today doing well. No concerns after provider visit today.     Intravenous Access:  Implanted Port.    Treatment Conditions:  Lab Results   Component Value Date    HGB 11.8 04/21/2022    WBC 7.2 04/21/2022    ANEU 3.1 06/22/2021    ANEUTAUTO 3.6 04/21/2022     04/21/2022      Lab Results   Component Value Date     04/21/2022    POTASSIUM 3.9 04/21/2022    CR 0.75 04/21/2022    TAYLER 8.9 04/21/2022    BILITOTAL 0.2 04/21/2022    ALBUMIN 3.2 (L) 04/21/2022    ALT 42 04/21/2022    AST 31 04/21/2022     Results reviewed, labs MET treatment parameters, ok to proceed with treatment.  ECHO/MUGA completed 3/9/22  EF 60-65%.    Post Infusion Assessment:  Patient tolerated infusion without incident.  Blood return noted pre and post infusion.  Access discontinued per protocol.     Discharge Plan:   Patient declined prescription refills.  AVS to patient via [employer].  Patient will return 5/12 for next appointment.   Patient discharged in stable condition accompanied by: self.  Departure Mode: Ambulatory.    Alicia Jean RN

## 2022-04-21 NOTE — LETTER
4/21/2022         RE: Hoda Brush  [address]  [place] [address]          Hoda is a patient from [email or web address] and is seen here for continuation of care for her metastatic ER+HER2- breast cancer.  She is a [employer] refugee from [facility] [[email or web address] and was initially seen in clinic with her daughter.  The only data we have from [facility] is an English translation of her records.       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  On 02/11/2014, she was seen by an oncologist.  The clinical staging of T4b N2 M1 was noted.   Her breast cancer was on the left side. There was no right breast cancer, confirmed by the patient and her daughter, correcting a possible error in the translated records.  ER was positive in 100% of the cells, HI at 14% and HER2 was 3+ positive.  It appears that this histopathologic information is on the mastectomy specimen. She underwent an MRI for staging of presumptive bone metastases which was performed 03/02/2014.  There were skeletal metastases in the thoracic vertebrae at 12, L1, L3, L5 and S1 vertebral body, ranging in size from 0.7-3.0 cm in size.  Ischial and right femur were also involved, as well as a large iliac mass measuring about 15 cm in the uterus. 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   Radiation Oncology consultation was performed 03/11/2014, and she was given radiation in 1 dose of 6 Gy to the right iliac lesion.        With the initial diagnosis, she initiated treatment with 6 cycles of CAF neoadjuvant chemotherapy 02/14, 07/07, 03/28, 04/18/14, 05/08 and 05/29/14. She also received monthly zoledronic acid.  She then underwent a modified left mastectomy of Palacios type and left lymphadenoectomy on 06/27/2014.   Pathologic examination showed number at [facility] was [id number] showed infiltrative grade 2 ductal cancer with 6 level 1 metastatic lymph nodes and 3 level 2 metastatic  lymph nodes.  The differentiation was intermediate.  The tumor was ER positive 70% of the cells, MA positive in 40% of the cells and HER2 was 3+ by immunohistochemistry and the Ki-67 labeling index was 20%. Her staging after surgery was stage IV, pT4b N2 M1.  I don't see a biopsy of the skeletal metastases.  She then had continuation with chemotherapy with 4 cycles of Herceptin and taxane with monthly zoledronic acid.  Tamoxifen was initiated.  She then had two years of Herceptin and a decision was made not to continue further Herceptin.  She continued on zoledronic acid every 3 months. She was clinically stable. She then moved to the [place] new refugee from [facility].  She has now been changed to letrozole.  Denosumab has now been held for new diagnosis of osteonecrosis of the R maxilla.     History of cholecystectomy 2020.    Seen in the ED on 10/4/2021, for acute abdominal pain.  Elevated LFTs, elevated lipase.  Question of stone versus pancreatitis.  She was discharged and followed up with an outpatient gastroenterology clinic.  They recommended clear liquid diet and ERCP.        TREATMENT HISTORY:  A. Initial diagnosis with metastatic breast cancer in [facility].  Neoadjuvant CAF x 6.   B. Left mastectomy. Left axillary node dissection.  C.  Radiation in 1 dose to R iliac region.  C. Herceptin for 2 years taxane for a prescribed course then stopped, monthly zoledronic acid.  She had 2 years of Herceptin with tamoxifen added after chemotherapy.  D.  Tamoxifen alone and zoledronic acid every 3 months starting 2014.  Letrozole was started   E.  [place]  We restarted Herceptin every 3 weeks and continued tamoxifen. Bone targeted agent changed to denosumab every 9 weeks. Zometa discontinued 2017.  Tamoxifen was changed to letrozole August 2019.    F.  Xgeva discontinued 12-17-19 because of dental issues.   G.  J+J vaccine March, 2021.  H.  Was on Zometa once May 11.  Reaction with eye lid swelling.  Discontinued Zometal  H.  Restart Xgeva 6-22-21.  Continuing the trastuzumab and letrozole.  Both tolerated well.       INTERVAL HISTORY   Hoda is feeling well. She notes since starting oxybutynin she is not getting up as much to urinate, and she is sleeping better. She has more energy during the day. She has been walking on nice days otherwise has a treadmill and has been walking. She continues to attend English classes four days per week, 3 hours per class. She has moved up to advanced classes. She is planning to apply for citizenship this summer.     She denies any fevers/chills or infectious concerns. No cough, SOB, or edema. No new or different pain, lumps/bumps. Appetite and energy are WNL.       PHYSICAL EXAM:  /66   Pulse 75   Temp 98.1  F (36.7  C)   Resp 16   Wt 81.3 kg (179 lb 4.8 oz)   SpO2 98%   BMI 32.02 kg/m    Wt Readings from Last 4 Encounters:   04/21/22 81.3 kg (179 lb 4.8 oz)   03/31/22 81.2 kg (179 lb)   03/10/22 80.7 kg (178 lb)   03/07/22 80.7 kg (178 lb)     General: Alert, oriented, pleasant, NAD  HEENT: Normocephalic, atraumatic, no icterus   Neck: No cervical or supraclavicular LAD.  Axillary: No LAD  Lungs: CTA bilaterally, normal work of breathing  Cardiac: RRR  Abdomen: Soft, nontender, nondistended. Normoactive bowel sounds. No hepatosplenomegaly, masses  Neuro: CNII-XII grossly intact  Extremities: No pedal edema        Labs  Most Recent 3 CBC's:  Recent Labs   Lab Test 04/21/22  1230 03/31/22  1329 03/09/22  0808   WBC 7.2 8.2 6.9   HGB 11.8 12.1 12.4   MCV 93 94 94    233 226    Most Recent 3 BMP's:  Recent Labs   Lab Test 04/21/22  1230 03/31/22  1329 03/09/22  0808    142 142   POTASSIUM 3.9 4.0 4.0   CHLORIDE 109 109 107   CO2 29 29 29   BUN 13 13 17   CR 0.75 0.71 0.79   ANIONGAP 6 4 6   TAYLER 8.9 8.7 9.1   * 76 101*    Most Recent 2 LFT's:  Recent Labs   Lab Test 04/21/22  1230 03/31/22  1329   AST 31 31   ALT 42 36   ALKPHOS 50 46   BILITOTAL  0.2 0.2      I reviewed the above labs today.       ASSESSMENT AND PLAN:     1.  Hoda Brush is a 66-year-old woman with a history of ER-positive, OH-positive, HER2 positive breast cancer.  She is from [facility], formally from [facility], and came to live in the [place] with [place].  The tumor is ER positive, OH positive and HER2 positive.  She had metastatic disease at the time of presentation with bone-only metastases by report.  She underwent neoadjuvant CAF, had a left mastectomy, left axillary lymph node dissection, radiation to the right hip, which included the right iliac region where she had metastatic disease.  She was initially on tamoxifen but then was switched to letrozole.  She continues on this and every 3-week trastuzumab. She has had no evidence of disease progression for the last 3 years.  We have continued Herceptin every 3 weeks as well as daily letrozole.   -- Tumor markers have been stable, will be drawn later today   - CT CAP 3/9/22 showed stable disease   -- Proceed with herceptin today. Continue letrozole   -- Repeat CT CAP in June   -- Echo due in June, scheduled     2.  Elevated LFTs, lipase, with abdominal pain- Resolved   -- Has no abdominal pain today, LFTs and bili are now normal   -- MRCP 11/4 normal other than hepatic steatosis     3. Bone health  - Xgeva was on hold due to possible ONJ. See Dr. Aguiar's note from 12/16--this was resumed on 1/6/22. Due in May. To be given every 3 months--this was confirmed with Dr. Aguiar.   - Continue Calcium and Vitamin D    4. Insomnia/overactive bladder   - Improved on oxybutynin 5 mg at bedtime.     JUSTINE Crockett PA-C

## 2022-04-21 NOTE — NURSING NOTE
Chief Complaint   Patient presents with     Port Draw     Labs drawn by RN via port, vitals taken.     Port accessed with 20 gauge 3/4 inch flat needle by RN, labs collected, line flushed with saline and heparin.  Vitals taken. Pt checked in for appointment(s).      Kallie Aguilera RN

## 2022-04-26 ENCOUNTER — IMMUNIZATION (OUTPATIENT)
Dept: NURSING | Facility: CLINIC | Age: 67
End: 2022-04-26
Payer: COMMERCIAL

## 2022-04-26 PROCEDURE — 91305 COVID-19,PF,PFIZER (12+ YRS): CPT

## 2022-04-26 PROCEDURE — 0054A COVID-19,PF,PFIZER (12+ YRS): CPT

## 2022-05-11 NOTE — PROGRESS NOTES
Hoda is a patient from [facility] and is seen here for continuation of care for her metastatic ER+HER2- breast cancer.  She is a refugee from [facility] and was initially seen in clinic with her daughter.  The only data we have from [facility] is an English translation of her records.       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  On 02/11/2014, she was seen by an oncologist.  The clinical staging of T4b N2 M1 was noted.   Her breast cancer was on the left side. There was no right breast cancer, confirmed by the patient and her daughter, correcting a possible error in the translated records.  ER was positive in 100% of the cells, AR at 14% and HER2 was 3+ positive.  It appears that this histopathologic information is on the mastectomy specimen. She underwent an MRI for staging of presumptive bone metastases which was performed 03/02/2014.  There were skeletal metastases in the thoracic vertebrae at 12, L1, L3, L5 and S1 vertebral body, ranging in size from 0.7-3.0 cm in size.  Ischial and right femur were also involved, as well as a large iliac mass measuring about 15 cm in the uterus. 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   Radiation Oncology consultation was performed 03/11/2014, and she was given radiation in 1 dose of 6 Gy to the right iliac lesion.        With the initial diagnosis, she initiated treatment with 6 cycles of CAF neoadjuvant chemotherapy 02/14, 07/07, 03/28, 04/18/14, 05/08 and 05/29/14. She also received monthly zoledronic acid.  She then underwent a modified left mastectomy of Palacios type and left lymphadenoectomy on 06/27/2014.   Pathologic examination showed number at [facility] was [id number] showed infiltrative grade 2 ductal cancer with 6 level 1 metastatic lympFollow up with Jossie for visits and trastuzumab on 2-5, 2-26, 3-19 with CBC, CMP.  Echocardiogram on 3-19.  Follow up with me 4-9 with CBC,  CMP, CA27.29 and CEA and with CT CAP on 4-8.h nodes and 3 level 2 metastatic lymph nodes.  The differentiation was intermediate.  The tumor was ER positive 70% of the cells, OK positive in 40% of the cells and HER2 was 3+ by immunohistochemistry and the Ki-67 labeling index was 20%. Her staging after surgery was stage IV, pT4b N2 M1.  I don't see a biopsy of the skeletal metastases.  She then had continuation with chemotherapy with 4 cycles of Herceptin and taxane with monthly zoledronic acid.  Tamoxifen was initiated.  She then had two years of Herceptin and a decision was made not to continue further Herceptin.  She continued on zoledronic acid every 3 months. She was clinically stable. [place] then [place] the [place] new [place] from [place].  She has now been changed to letrozole.  Denosumab has now been held for new diagnosis of osteonecrosis of the R maxilla.     History of cholecystectomy 2020.      TREATMENT HISTORY:  A. Initial diagnosis with metastatic breast cancer in [facility].  Neoadjuvant CAF x 6.   B. Left mastectomy. Left axillary node dissection.  C.  Radiation in 1 dose to R iliac region.  C. Herceptin for 2 years taxane for a prescribed course then stopped, monthly zoledronic acid.  She had 2 years of Herceptin with tamoxifen added after chemotherapy.  D.  Tamoxifen alone and zoledronic acid every 3 months starting 2014.  Letrozole was started   E[place]  [place]  We restarted Herceptin every 3 weeks and continued tamoxifen. Bone targeted agent changed to denosumab every 9 weeks. Zometa discontinued 2017.  Tamoxifen was changed to letrozole August 2019.    F.  Xgeva discontinued 12-17-19 because of dental issues.   G.  J+J vaccine March, 2021.  H.  Was on Zometa once May 11.  Reaction with eye lid swelling. Discontinued Zometal  H.  Restart Xgeva 6-22-21.  Continuing the trastuzumab and letrozole.  Both tolerated well.       INTERVAL HISTORY    Hoda returns to clinic and has been feeling quite well.   She has no pain, fatigue, depression, anxiety.  She has no complaints today.     She is eating a healthful diet, Mediterranean style.  She is exercising and trying to get to 150 minutes a week.  She does have a treadmill at home.  She does not drink alcohol.  She takes vitamin D and calcium and she has had COVID vaccination x3 as well as Pneumovax.        REVIEW OF SYSTEMS:  The remainder of a 10 point ROS was negative.      PHYSICAL EXAM:   /77   Pulse 79   Temp 97.8  F (36.6  C) (Oral)   Resp 14   Wt 81.8 kg (180 lb 4.8 oz)   SpO2 97%   BMI 32.19 kg/m    GENERAL:  Hoda appeared generally well.  She appeared generally well.  No jaundice.  No alopecia    HEENT:  Oropharynx is without lesions.  LYMPH:  There is no palpable cervical, supraclavicular, subclavicular or axillary lymphadenopathy.    BREASTS:  Examination of the left breast reveals no masses.  Examination of the left anterior chest wall reveals a left mastectomy incision. Below the incision, there are some pigmented maculopapular skin lesions.  They do not appear suspicious.  There are no notable dermal lesions.  LUNGS:  Clear to percussion and auscultation.  HEART:  Regular rate and rhythm.  S1, S2.  ABDOMEN:  Soft, nontender without hepatosplenomegaly.  EXTREMITIES:  Without edema.    SKIN:  There is a faint erythematous macular rash on the dorsum of the left foot.  PSYCH:  Mood and affect were normal.     Labs   CMP was within normal limits except for calcium of 8.4.  Albumin is slightly decreased at 3.2.  CBC was normal.       Imaging   CT CAP  March 9, 2022  IMPRESSION: Continued appearance of left mastectomy, bilateral  pulmonary nodules, osteoblastic metastases an 5 mm short axis left  axillary lymph node. Cholecystectomy. All findings unchanged.     LELA CHENG MD        ASSESSMENT AND PLAN:     1.  Hoda Brush is a 67-year-old woman with a history of ER-positive, [place]-positive, HER2 positive breast cancer.  She is from  [place], formally from [facility], and came to live in the [place] with [place].  The tumor is ER positive, MD positive and HER2 positive.  She had metastatic disease at the time of presentation with bone-only metastases by report.  She underwent neoadjuvant CAF, had a left mastectomy, left axillary lymph node dissection, radiation to the right hip, which included the right iliac region where she had metastatic disease.  She was initially on tamoxifen but then was switched to letrozole.  She continues on this and every 3-week trastuzumab.  She has had no evidence of disease progression for the last 3 years.  Markers are low and stable.  We have continued Herceptin every 3 weeks as well as daily letrozole. CT CAP shows stable pulmonary nodules.  The left axillary lymph node is decreased compared with the prior CT.  2.  Episode of abdominal pain and elevated LFT's and mild intrahepatic biliar dilatation.  This problem has resolved and may have been related to a gallstone.  She had an MRCP which was normal except for fatty liver and status post cholecystectomy.  3.  Continue the letrozole.  Letrozole has been well-tolerated.  No significant joint stiffness.  4.  Echo. Stable EF. --Echo in  showed normal EF without change 60 to 65%. 2-16-21.  5.  Discussion of bone health and right maxillary osteonecrosis.  She will continue with calcium and vitamin D. We will change the Xgeva to every 3 months.  6. Follow up. Continue Xgeva on 6-23.  Continue trastuzumab 5-12, 6-2, 6-23.  CT CAP on 6-1.  DENITA visits Denita 6-2 and me June 23 with CBC, CMP.  CBC, CMP all visits.  CA27.29 and CEA 6-1.  Echocardiogram 6-23.       Thank you for allowing us to participate in this patient's care.      Sincerely,     Lisandro Aguiar M.D.   Professor   Division of Hematology, Oncology, Transplant   Department of Medicine   [employer]   [phone]               8:00-8:35  I spent 35 minutes with the patient more  than 50% of which was in counseling and coordination of care.

## 2022-05-12 ENCOUNTER — INFUSION THERAPY VISIT (OUTPATIENT)
Dept: ONCOLOGY | Facility: CLINIC | Age: 67
End: 2022-05-12
Attending: INTERNAL MEDICINE
Payer: COMMERCIAL

## 2022-05-12 ENCOUNTER — APPOINTMENT (OUTPATIENT)
Dept: LAB | Facility: CLINIC | Age: 67
End: 2022-05-12
Attending: INTERNAL MEDICINE
Payer: COMMERCIAL

## 2022-05-12 VITALS
WEIGHT: 180.3 LBS | TEMPERATURE: 97.8 F | BODY MASS INDEX: 32.19 KG/M2 | HEART RATE: 79 BPM | DIASTOLIC BLOOD PRESSURE: 77 MMHG | OXYGEN SATURATION: 97 % | SYSTOLIC BLOOD PRESSURE: 120 MMHG | RESPIRATION RATE: 14 BRPM

## 2022-05-12 DIAGNOSIS — C50.912 MALIGNANT NEOPLASM OF LEFT FEMALE BREAST, UNSPECIFIED ESTROGEN RECEPTOR STATUS, UNSPECIFIED SITE OF BREAST (H): Primary | ICD-10-CM

## 2022-05-12 DIAGNOSIS — C79.51 BONE METASTASIS: ICD-10-CM

## 2022-05-12 DIAGNOSIS — C50.912 MALIGNANT NEOPLASM OF LEFT FEMALE BREAST, UNSPECIFIED ESTROGEN RECEPTOR STATUS, UNSPECIFIED SITE OF BREAST (H): ICD-10-CM

## 2022-05-12 DIAGNOSIS — C79.51 BONE METASTASIS: Primary | ICD-10-CM

## 2022-05-12 DIAGNOSIS — Z51.11 ENCOUNTER FOR ANTINEOPLASTIC CHEMOTHERAPY: ICD-10-CM

## 2022-05-12 LAB
ALBUMIN SERPL-MCNC: 3.2 G/DL (ref 3.4–5)
ALP SERPL-CCNC: 50 U/L (ref 40–150)
ALT SERPL W P-5'-P-CCNC: 38 U/L (ref 0–50)
ANION GAP SERPL CALCULATED.3IONS-SCNC: 8 MMOL/L (ref 3–14)
AST SERPL W P-5'-P-CCNC: 32 U/L (ref 0–45)
BASOPHILS # BLD AUTO: 0 10E3/UL (ref 0–0.2)
BASOPHILS NFR BLD AUTO: 1 %
BILIRUB SERPL-MCNC: 0.2 MG/DL (ref 0.2–1.3)
BUN SERPL-MCNC: 10 MG/DL (ref 7–30)
CALCIUM SERPL-MCNC: 8.4 MG/DL (ref 8.5–10.1)
CANCER AG27-29 SERPL-ACNC: 7 U/ML (ref 0–39)
CEA SERPL-MCNC: 0.6 UG/L (ref 0–2.5)
CHLORIDE BLD-SCNC: 109 MMOL/L (ref 94–109)
CO2 SERPL-SCNC: 27 MMOL/L (ref 20–32)
CREAT SERPL-MCNC: 0.7 MG/DL (ref 0.52–1.04)
EOSINOPHIL # BLD AUTO: 0.3 10E3/UL (ref 0–0.7)
EOSINOPHIL NFR BLD AUTO: 5 %
ERYTHROCYTE [DISTWIDTH] IN BLOOD BY AUTOMATED COUNT: 13.5 % (ref 10–15)
GFR SERPL CREATININE-BSD FRML MDRD: >90 ML/MIN/1.73M2
GLUCOSE BLD-MCNC: 97 MG/DL (ref 70–99)
HCT VFR BLD AUTO: 39.4 % (ref 35–47)
HGB BLD-MCNC: 12.2 G/DL (ref 11.7–15.7)
IMM GRANULOCYTES # BLD: 0 10E3/UL
IMM GRANULOCYTES NFR BLD: 0 %
LYMPHOCYTES # BLD AUTO: 2.6 10E3/UL (ref 0.8–5.3)
LYMPHOCYTES NFR BLD AUTO: 41 %
MCH RBC QN AUTO: 29.3 PG (ref 26.5–33)
MCHC RBC AUTO-ENTMCNC: 31 G/DL (ref 31.5–36.5)
MCV RBC AUTO: 95 FL (ref 78–100)
MONOCYTES # BLD AUTO: 0.4 10E3/UL (ref 0–1.3)
MONOCYTES NFR BLD AUTO: 7 %
NEUTROPHILS # BLD AUTO: 3 10E3/UL (ref 1.6–8.3)
NEUTROPHILS NFR BLD AUTO: 46 %
NRBC # BLD AUTO: 0 10E3/UL
NRBC BLD AUTO-RTO: 0 /100
PLATELET # BLD AUTO: 211 10E3/UL (ref 150–450)
POTASSIUM BLD-SCNC: 4.3 MMOL/L (ref 3.4–5.3)
PROT SERPL-MCNC: 7.6 G/DL (ref 6.8–8.8)
RBC # BLD AUTO: 4.16 10E6/UL (ref 3.8–5.2)
SODIUM SERPL-SCNC: 144 MMOL/L (ref 133–144)
WBC # BLD AUTO: 6.3 10E3/UL (ref 4–11)

## 2022-05-12 PROCEDURE — 258N000003 HC RX IP 258 OP 636: Performed by: INTERNAL MEDICINE

## 2022-05-12 PROCEDURE — 86300 IMMUNOASSAY TUMOR CA 15-3: CPT | Performed by: INTERNAL MEDICINE

## 2022-05-12 PROCEDURE — 99214 OFFICE O/P EST MOD 30 MIN: CPT | Performed by: INTERNAL MEDICINE

## 2022-05-12 PROCEDURE — 96372 THER/PROPH/DIAG INJ SC/IM: CPT | Performed by: INTERNAL MEDICINE

## 2022-05-12 PROCEDURE — 36415 COLL VENOUS BLD VENIPUNCTURE: CPT | Performed by: INTERNAL MEDICINE

## 2022-05-12 PROCEDURE — 82040 ASSAY OF SERUM ALBUMIN: CPT | Performed by: INTERNAL MEDICINE

## 2022-05-12 PROCEDURE — 250N000011 HC RX IP 250 OP 636: Performed by: INTERNAL MEDICINE

## 2022-05-12 PROCEDURE — G0463 HOSPITAL OUTPT CLINIC VISIT: HCPCS

## 2022-05-12 PROCEDURE — 85025 COMPLETE CBC W/AUTO DIFF WBC: CPT | Performed by: INTERNAL MEDICINE

## 2022-05-12 PROCEDURE — 80053 COMPREHEN METABOLIC PANEL: CPT | Performed by: INTERNAL MEDICINE

## 2022-05-12 PROCEDURE — 96372 THER/PROPH/DIAG INJ SC/IM: CPT | Mod: XS

## 2022-05-12 PROCEDURE — 96413 CHEMO IV INFUSION 1 HR: CPT

## 2022-05-12 PROCEDURE — 82378 CARCINOEMBRYONIC ANTIGEN: CPT | Performed by: INTERNAL MEDICINE

## 2022-05-12 RX ORDER — EPINEPHRINE 0.3 MG/.3ML
0.3 INJECTION SUBCUTANEOUS EVERY 5 MIN PRN
Status: CANCELLED | OUTPATIENT
Start: 2022-06-02

## 2022-05-12 RX ORDER — METHYLPREDNISOLONE SODIUM SUCCINATE 125 MG/2ML
125 INJECTION, POWDER, LYOPHILIZED, FOR SOLUTION INTRAMUSCULAR; INTRAVENOUS
Status: CANCELLED
Start: 2022-06-02

## 2022-05-12 RX ORDER — ALBUTEROL SULFATE 0.83 MG/ML
2.5 SOLUTION RESPIRATORY (INHALATION)
Status: CANCELLED | OUTPATIENT
Start: 2022-05-12

## 2022-05-12 RX ORDER — SODIUM CHLORIDE 9 MG/ML
1000 INJECTION, SOLUTION INTRAVENOUS CONTINUOUS PRN
Status: CANCELLED
Start: 2022-06-23

## 2022-05-12 RX ORDER — ALBUTEROL SULFATE 0.83 MG/ML
2.5 SOLUTION RESPIRATORY (INHALATION)
Status: CANCELLED | OUTPATIENT
Start: 2022-06-02

## 2022-05-12 RX ORDER — EPINEPHRINE 0.3 MG/.3ML
0.3 INJECTION SUBCUTANEOUS EVERY 5 MIN PRN
Status: CANCELLED | OUTPATIENT
Start: 2022-05-12

## 2022-05-12 RX ORDER — METHYLPREDNISOLONE SODIUM SUCCINATE 125 MG/2ML
125 INJECTION, POWDER, LYOPHILIZED, FOR SOLUTION INTRAMUSCULAR; INTRAVENOUS
Status: CANCELLED
Start: 2022-06-23

## 2022-05-12 RX ORDER — ACETAMINOPHEN 325 MG/1
650 TABLET ORAL
Status: CANCELLED | OUTPATIENT
Start: 2022-05-12

## 2022-05-12 RX ORDER — MEPERIDINE HYDROCHLORIDE 25 MG/ML
25 INJECTION INTRAMUSCULAR; INTRAVENOUS; SUBCUTANEOUS EVERY 30 MIN PRN
Status: CANCELLED | OUTPATIENT
Start: 2022-05-12

## 2022-05-12 RX ORDER — ALBUTEROL SULFATE 90 UG/1
1-2 AEROSOL, METERED RESPIRATORY (INHALATION)
Status: CANCELLED
Start: 2022-06-23

## 2022-05-12 RX ORDER — HEPARIN SODIUM (PORCINE) LOCK FLUSH IV SOLN 100 UNIT/ML 100 UNIT/ML
5 SOLUTION INTRAVENOUS EVERY 8 HOURS
Status: CANCELLED | OUTPATIENT
Start: 2022-05-12

## 2022-05-12 RX ORDER — EPINEPHRINE 1 MG/ML
0.3 INJECTION, SOLUTION INTRAMUSCULAR; SUBCUTANEOUS EVERY 5 MIN PRN
Status: CANCELLED | OUTPATIENT
Start: 2022-06-02

## 2022-05-12 RX ORDER — LORAZEPAM 2 MG/ML
0.5 INJECTION INTRAMUSCULAR EVERY 4 HOURS PRN
Status: CANCELLED
Start: 2022-05-12

## 2022-05-12 RX ORDER — SODIUM CHLORIDE 9 MG/ML
1000 INJECTION, SOLUTION INTRAVENOUS CONTINUOUS PRN
Status: CANCELLED
Start: 2022-06-02

## 2022-05-12 RX ORDER — HEPARIN SODIUM (PORCINE) LOCK FLUSH IV SOLN 100 UNIT/ML 100 UNIT/ML
5 SOLUTION INTRAVENOUS DAILY PRN
Status: DISCONTINUED | OUTPATIENT
Start: 2022-05-12 | End: 2022-05-12 | Stop reason: HOSPADM

## 2022-05-12 RX ORDER — HEPARIN SODIUM (PORCINE) LOCK FLUSH IV SOLN 100 UNIT/ML 100 UNIT/ML
5 SOLUTION INTRAVENOUS EVERY 8 HOURS
Status: CANCELLED | OUTPATIENT
Start: 2022-06-23

## 2022-05-12 RX ORDER — HEPARIN SODIUM (PORCINE) LOCK FLUSH IV SOLN 100 UNIT/ML 100 UNIT/ML
5 SOLUTION INTRAVENOUS EVERY 8 HOURS
Status: DISCONTINUED | OUTPATIENT
Start: 2022-05-12 | End: 2022-05-12 | Stop reason: HOSPADM

## 2022-05-12 RX ORDER — MEPERIDINE HYDROCHLORIDE 25 MG/ML
25 INJECTION INTRAMUSCULAR; INTRAVENOUS; SUBCUTANEOUS EVERY 30 MIN PRN
Status: CANCELLED | OUTPATIENT
Start: 2022-06-23

## 2022-05-12 RX ORDER — EPINEPHRINE 0.3 MG/.3ML
0.3 INJECTION SUBCUTANEOUS EVERY 5 MIN PRN
Status: CANCELLED | OUTPATIENT
Start: 2022-06-23

## 2022-05-12 RX ORDER — DIPHENHYDRAMINE HYDROCHLORIDE 50 MG/ML
50 INJECTION INTRAMUSCULAR; INTRAVENOUS
Status: CANCELLED
Start: 2022-06-23

## 2022-05-12 RX ORDER — MEPERIDINE HYDROCHLORIDE 25 MG/ML
25 INJECTION INTRAMUSCULAR; INTRAVENOUS; SUBCUTANEOUS EVERY 30 MIN PRN
Status: CANCELLED | OUTPATIENT
Start: 2022-06-02

## 2022-05-12 RX ORDER — ACETAMINOPHEN 325 MG/1
650 TABLET ORAL
Status: CANCELLED | OUTPATIENT
Start: 2022-06-23

## 2022-05-12 RX ORDER — ALBUTEROL SULFATE 0.83 MG/ML
2.5 SOLUTION RESPIRATORY (INHALATION)
Status: CANCELLED | OUTPATIENT
Start: 2022-06-23

## 2022-05-12 RX ORDER — ALBUTEROL SULFATE 90 UG/1
1-2 AEROSOL, METERED RESPIRATORY (INHALATION)
Status: CANCELLED
Start: 2022-06-02

## 2022-05-12 RX ORDER — DIPHENHYDRAMINE HCL 25 MG
50 CAPSULE ORAL ONCE
Status: CANCELLED
Start: 2022-05-12

## 2022-05-12 RX ORDER — LORAZEPAM 2 MG/ML
0.5 INJECTION INTRAMUSCULAR EVERY 4 HOURS PRN
Status: CANCELLED
Start: 2022-06-02

## 2022-05-12 RX ORDER — ACETAMINOPHEN 325 MG/1
650 TABLET ORAL
Status: CANCELLED | OUTPATIENT
Start: 2022-06-02

## 2022-05-12 RX ORDER — ALBUTEROL SULFATE 90 UG/1
1-2 AEROSOL, METERED RESPIRATORY (INHALATION)
Status: CANCELLED
Start: 2022-05-12

## 2022-05-12 RX ORDER — DIPHENHYDRAMINE HCL 25 MG
50 CAPSULE ORAL ONCE
Status: CANCELLED
Start: 2022-06-02

## 2022-05-12 RX ORDER — DIPHENHYDRAMINE HYDROCHLORIDE 50 MG/ML
50 INJECTION INTRAMUSCULAR; INTRAVENOUS
Status: CANCELLED
Start: 2022-05-12

## 2022-05-12 RX ORDER — HEPARIN SODIUM (PORCINE) LOCK FLUSH IV SOLN 100 UNIT/ML 100 UNIT/ML
5 SOLUTION INTRAVENOUS EVERY 8 HOURS
Status: CANCELLED | OUTPATIENT
Start: 2022-06-02

## 2022-05-12 RX ORDER — EPINEPHRINE 1 MG/ML
0.3 INJECTION, SOLUTION INTRAMUSCULAR; SUBCUTANEOUS EVERY 5 MIN PRN
Status: CANCELLED | OUTPATIENT
Start: 2022-06-23

## 2022-05-12 RX ORDER — EPINEPHRINE 1 MG/ML
0.3 INJECTION, SOLUTION INTRAMUSCULAR; SUBCUTANEOUS EVERY 5 MIN PRN
Status: CANCELLED | OUTPATIENT
Start: 2022-05-12

## 2022-05-12 RX ORDER — DIPHENHYDRAMINE HCL 25 MG
50 CAPSULE ORAL ONCE
Status: CANCELLED
Start: 2022-06-23

## 2022-05-12 RX ORDER — METHYLPREDNISOLONE SODIUM SUCCINATE 125 MG/2ML
125 INJECTION, POWDER, LYOPHILIZED, FOR SOLUTION INTRAMUSCULAR; INTRAVENOUS
Status: CANCELLED
Start: 2022-05-12

## 2022-05-12 RX ORDER — LORAZEPAM 2 MG/ML
0.5 INJECTION INTRAMUSCULAR EVERY 4 HOURS PRN
Status: CANCELLED
Start: 2022-06-23

## 2022-05-12 RX ORDER — DIPHENHYDRAMINE HYDROCHLORIDE 50 MG/ML
50 INJECTION INTRAMUSCULAR; INTRAVENOUS
Status: CANCELLED
Start: 2022-06-02

## 2022-05-12 RX ORDER — SODIUM CHLORIDE 9 MG/ML
1000 INJECTION, SOLUTION INTRAVENOUS CONTINUOUS PRN
Status: CANCELLED
Start: 2022-05-12

## 2022-05-12 RX ADMIN — TRASTUZUMAB 450 MG: 150 INJECTION, POWDER, LYOPHILIZED, FOR SOLUTION INTRAVENOUS at 10:00

## 2022-05-12 RX ADMIN — DENOSUMAB 120 MG: 120 INJECTION SUBCUTANEOUS at 10:03

## 2022-05-12 RX ADMIN — Medication 5 ML: at 08:27

## 2022-05-12 RX ADMIN — Medication 5 ML: at 10:35

## 2022-05-12 RX ADMIN — SODIUM CHLORIDE 250 ML: 9 INJECTION, SOLUTION INTRAVENOUS at 09:19

## 2022-05-12 ASSESSMENT — PAIN SCALES - GENERAL: PAINLEVEL: NO PAIN (0)

## 2022-05-12 NOTE — PROGRESS NOTES
Infusion Nursing Note:  Hoda Brush presents today for Cycle 80 Day 1 Herceptin + Xgeva.    Patient seen by provider today: Yes: Dr. Aguiar   present during visit today: Language: Bulgarian. Patient refused  services and a waiver form has been signed today in infusion.     Note: Pt arrives feeling well with no further complaints or concerns after MD visit.      Intravenous Access:  Implanted Port.    Treatment Conditions:  Lab Results   Component Value Date    HGB 12.2 05/12/2022    WBC 6.3 05/12/2022    ANEU 3.1 06/22/2021    ANEUTAUTO 3.0 05/12/2022     05/12/2022      Lab Results   Component Value Date     05/12/2022    POTASSIUM 4.3 05/12/2022    CR 0.70 05/12/2022    TAYLER 8.4 (L) 05/12/2022    BILITOTAL 0.2 05/12/2022    ALBUMIN 3.2 (L) 05/12/2022    ALT 38 05/12/2022    AST 32 05/12/2022     Results reviewed, labs MET treatment parameters, ok to proceed with treatment.  ECHO/MUGA completed 3/9/22  EF 60-65%. Next ECHO scheduled for 6/22/22.      Post Infusion Assessment:  Patient tolerated infusion without incident.  Patient tolerated Xgeva injection without incident into RIGHT arm.  Blood return noted pre and post infusion.  Site patent and intact, free from redness, edema or discomfort.  No evidence of extravasations.  Access discontinued per protocol.       Discharge Plan:   Patient declined prescription refills.  Discharge instructions reviewed with: Patient.  Patient and/or family verbalized understanding of discharge instructions and all questions answered.  AVS to patient via [employer].  Patient will return 6/2 for next appointment.   Patient discharged in stable condition accompanied by: self.  Departure Mode: Ambulatory.      Emily Cullen RN

## 2022-05-12 NOTE — LETTER
5/12/2022         RE: Hoda Brush  [address]  [place] [place] [facility] [address]        Dear Colleague,    Thank you for referring your patient, Hoda Brush, to the [facility]. Please see a copy of my visit note below.    Hoda is a patient from [facility] and is seen here for continuation of care for her metastatic ER+HER2- breast cancer.  She is a refugee from [facility] and was initially seen in clinic with her daughter.  The only data we have from [facility] is an English translation of her records.       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  On 02/11/2014, she was seen by an oncologist.  The clinical staging of T4b N2 M1 was noted.   Her breast cancer was on the left side. There was no right breast cancer, confirmed by the patient and her daughter, correcting a possible error in the translated records.  ER was positive in 100% of the cells, [place] at 14% and HER2 was 3+ positive.  It appears that this histopathologic information is on the mastectomy specimen. She underwent an MRI for staging of presumptive bone metastases which was performed 03/02/2014.  There were skeletal metastases in the thoracic vertebrae at 12, L1, L3, L5 and S1 vertebral body, ranging in size from 0.7-3.0 cm in size.  Ischial and right femur were also involved, as well as a large iliac mass measuring about 15 cm in the uterus. 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   Radiation Oncology consultation was performed 03/11/2014, and she was given radiation in 1 dose of 6 Gy to the right iliac lesion.        With the initial diagnosis, she initiated treatment with 6 cycles of CAF neoadjuvant chemotherapy 02/14, 07/07, 03/28, 04/18/14, 05/08 and 05/29/14. She also received monthly zoledronic acid.  She then underwent a modified left mastectomy of Palacios type and left lymphadenoectomy on 06/27/2014.   Pathologic  examination showed number at [facility] was [id number] showed infiltrative grade 2 ductal cancer with 6 level 1 metastatic lympFollow up with Jossie for visits and trastuzumab on 2-5, 2-26, 3-19 with CBC, CMP.  Echocardiogram on 3-19.  Follow up with me 4-9 with CBC, CMP, CA27.29 and CEA and with CT CAP on 4-8.h nodes and 3 level 2 metastatic lymph nodes.  The differentiation was intermediate.  The tumor was ER positive 70% of the cells, NE positive in 40% of the cells and HER2 was 3+ by immunohistochemistry and the Ki-67 labeling index was 20%. Her staging after surgery was stage IV, pT4b N2 M1.  I don't see a biopsy of the skeletal metastases.  She then had continuation with chemotherapy with 4 cycles of Herceptin and taxane with monthly zoledronic acid.  Tamoxifen was initiated.  She then had two years of Herceptin and a decision was made not to continue further Herceptin.  She continued on zoledronic acid every 3 months. She was clinically stable. [place] then [place] the [place] new refugee from [facility].  She has now been changed to letrozole.  Denosumab has now been held for new diagnosis of osteonecrosis of the R maxilla.     History of cholecystectomy 2020.      TREATMENT HISTORY:  A. Initial diagnosis with metastatic breast cancer in [facility].  Neoadjuvant CAF x 6.   B. Left mastectomy. Left axillary node dissection.  C.  Radiation in 1 dose to R iliac region.  C. Herceptin for 2 years taxane for a prescribed course then stopped, monthly zoledronic acid.  She had 2 years of Herceptin with tamoxifen added after chemotherapy.  D.  Tamoxifen alone and zoledronic acid every 3 months starting 2014.  Letrozole was started   E.  [place]  We restarted Herceptin every 3 weeks and continued tamoxifen. Bone targeted agent changed to denosumab every 9 weeks. Zometa discontinued 2017.  Tamoxifen was changed to letrozole August 2019.    GIANLUCA  Xgeva discontinued 12-17-19 because of dental issues.   G.  J+J vaccine  March, 2021.  H.  Was on Zometa once May 11.  Reaction with eye lid swelling. Discontinued Zometal  H.  Restart Xgeva 6-22-21.  Continuing the trastuzumab and letrozole.  Both tolerated well.       INTERVAL HISTORY    Hoda returns to clinic and has been feeling quite well.  She has no pain, fatigue, depression, anxiety.  She has no complaints today.     She is eating a healthful diet, Mediterranean style.  She is exercising and trying to get to 150 minutes a week.  She does have a treadmill at home.  She does not drink alcohol.  She takes vitamin D and calcium and she has had COVID vaccination x3 as well as Pneumovax.        REVIEW OF SYSTEMS:  The remainder of a 10 point ROS was negative.      PHYSICAL EXAM:   /77   Pulse 79   Temp 97.8  F (36.6  C) (Oral)   Resp 14   Wt 81.8 kg (180 lb 4.8 oz)   SpO2 97%   BMI 32.19 kg/m    GENERAL:  Hoda appeared generally well.  She appeared generally well.  No jaundice.  No alopecia    HEENT:  Oropharynx is without lesions.  LYMPH:  There is no palpable cervical, supraclavicular, subclavicular or axillary lymphadenopathy.    BREASTS:  Examination of the left breast reveals no masses.  Examination of the left anterior chest wall reveals a left mastectomy incision. Below the incision, there are some pigmented maculopapular skin lesions.  They do not appear suspicious.  There are no notable dermal lesions.  LUNGS:  Clear to percussion and auscultation.  HEART:  Regular rate and rhythm.  S1, S2.  ABDOMEN:  Soft, nontender without hepatosplenomegaly.  EXTREMITIES:  Without edema.    SKIN:  There is a faint erythematous macular rash on the dorsum of the left foot.  PSYCH:  Mood and affect were normal.     Labs   CMP was within normal limits except for calcium of 8.4.  Albumin is slightly decreased at 3.2.  CBC was normal.       Imaging   CT CAP  March 9, 2022  IMPRESSION: Continued appearance of left mastectomy, bilateral  pulmonary nodules, osteoblastic metastases an  5 mm short axis left  axillary lymph node. Cholecystectomy. All findings unchanged.     LELA CHENG MD        ASSESSMENT AND PLAN:     1.  Hoda Brush is a 67-year-old woman with a history of ER-positive, [place]-positive, HER2 positive breast cancer.  She is from [facility], formally from [facility], and came to live in the [place] with [place].  The tumor is ER positive, [place] positive and HER2 positive.  She had metastatic disease at the time of presentation with bone-only metastases by report.  She underwent neoadjuvant CAF, had a left mastectomy, left axillary lymph node dissection, radiation to the right hip, which included the right iliac region where she had metastatic disease.  She was initially on tamoxifen but then was switched to letrozole.  She continues on this and every 3-week trastuzumab.  She has had no evidence of disease progression for the last 3 years.  Markers are low and stable.  We have continued Herceptin every 3 weeks as well as daily letrozole. CT CAP shows stable pulmonary nodules.  The left axillary lymph node is decreased compared with the prior CT.  2.  Episode of abdominal pain and elevated LFT's and mild intrahepatic biliar dilatation.  This problem has resolved and may have been related to a gallstone.  She had an MRCP which was normal except for fatty liver and status post cholecystectomy.  3.  Continue the letrozole.  Letrozole has been well-tolerated.  No significant joint stiffness.  4.  Echo. Stable EF. --Echo in  showed normal EF without change 60 to 65%. 2-16-21.  5.  Discussion of bone health and right maxillary osteonecrosis.  She will continue with calcium and vitamin D. We will change the Xgeva to every 3 months.  6. Follow up. Continue Xgeva on 6-23.  Continue trastuzumab 5-12, 6-2, 6-23.  CT CAP on 6-1.  KARISSA visits Karissa 6-2 and me June 23 with CBC, CMP.  CBC, CMP all visits.  CA27.29 and CEA 6-1.  Echocardiogram 6-23.       Thank you for allowing us to  participate in this patient's care.      Sincerely,     Lisandro Aguiar M.D.   Professor   Division of Hematology, Oncology, Transplant   Department of Medicine   [employer]   [phone]               8:00-8:35  I spent 35 minutes with the patient more than 50% of which was in counseling and coordination of care.

## 2022-05-12 NOTE — NURSING NOTE
"Oncology Rooming Note    May 12, 2022 8:39 AM   Hoda Brush is a 67 year old female who presents for:    Chief Complaint   Patient presents with     Port Draw     Labs drawn via port by RN in lab. VS taken.      Oncology Clinic Visit     Pt is here for a rtn for Breast Cancer     Initial Vitals: Blood Pressure 120/77   Pulse 79   Temperature 97.8  F (36.6  C) (Oral)   Respiration 14   Weight 81.8 kg (180 lb 4.8 oz)   Oxygen Saturation 97%   Body Mass Index 32.19 kg/m   Estimated body mass index is 32.19 kg/m  as calculated from the following:    Height as of 3/7/22: 1.594 m (5' 2.75\").    Weight as of this encounter: 81.8 kg (180 lb 4.8 oz). Body surface area is 1.9 meters squared.  No Pain (0) Comment: Data Unavailable   No LMP recorded. Patient is postmenopausal.  Allergies reviewed: Yes  Medications reviewed: Yes    Medications: Medication refills not needed today.  Pharmacy name entered into [employer]: [id number]  [place] - [address]    Clinical concerns: none       Alejandra Ibarra MA            "

## 2022-05-13 ENCOUNTER — PATIENT OUTREACH (OUTPATIENT)
Dept: GERIATRIC MEDICINE | Facility: CLINIC | Age: 67
End: 2022-05-13
Payer: COMMERCIAL

## 2022-05-13 NOTE — PROGRESS NOTES
[facility] Care Coordination Contact      [facility] Six-Month Telephone Assessment    6 month telephone assessment completed on 05/13/22.    ER visits: No  Hospitalizations: No  TCU stays: No  Significant health status changes: None. Continues to receive treatments for her breast CA. States she is doing well and tolerating the treatments without difficulty.  Falls/Injuries: No  ADL/IADL changes: No  Changes in services: No    Caregiver Assessment follow up:  na    Goals: See POC in chart for goal progress documentation.      Will see member in 6 months for an annual health risk assessment.   Encouraged member to call CC with any questions or concerns in the meantime.   Lory Arriaga RN  [facility] Care Coordinator  [phone]

## 2022-05-25 ENCOUNTER — DOCUMENTATION ONLY (OUTPATIENT)
Dept: OTHER | Facility: CLINIC | Age: 67
End: 2022-05-25
Payer: COMMERCIAL

## 2022-06-01 ENCOUNTER — ANCILLARY PROCEDURE (OUTPATIENT)
Dept: CT IMAGING | Facility: CLINIC | Age: 67
End: 2022-06-01
Attending: INTERNAL MEDICINE
Payer: COMMERCIAL

## 2022-06-01 ENCOUNTER — LAB (OUTPATIENT)
Dept: LAB | Facility: CLINIC | Age: 67
End: 2022-06-01
Attending: INTERNAL MEDICINE
Payer: COMMERCIAL

## 2022-06-01 DIAGNOSIS — C50.912 MALIGNANT NEOPLASM OF LEFT FEMALE BREAST, UNSPECIFIED ESTROGEN RECEPTOR STATUS, UNSPECIFIED SITE OF BREAST (H): ICD-10-CM

## 2022-06-01 DIAGNOSIS — Z95.828 PORT-A-CATH IN PLACE: Primary | ICD-10-CM

## 2022-06-01 DIAGNOSIS — C50.912 MALIGNANT NEOPLASM OF LEFT FEMALE BREAST, UNSPECIFIED ESTROGEN RECEPTOR STATUS, UNSPECIFIED SITE OF BREAST (H): Primary | ICD-10-CM

## 2022-06-01 LAB
ALBUMIN SERPL-MCNC: 3.5 G/DL (ref 3.4–5)
ALP SERPL-CCNC: 52 U/L (ref 40–150)
ALT SERPL W P-5'-P-CCNC: 43 U/L (ref 0–50)
ANION GAP SERPL CALCULATED.3IONS-SCNC: 6 MMOL/L (ref 3–14)
AST SERPL W P-5'-P-CCNC: 37 U/L (ref 0–45)
BASOPHILS # BLD AUTO: 0 10E3/UL (ref 0–0.2)
BASOPHILS NFR BLD AUTO: 0 %
BILIRUB SERPL-MCNC: 0.3 MG/DL (ref 0.2–1.3)
BUN SERPL-MCNC: 16 MG/DL (ref 7–30)
CALCIUM SERPL-MCNC: 9.7 MG/DL (ref 8.5–10.1)
CANCER AG27-29 SERPL-ACNC: 11 U/ML (ref 0–39)
CEA SERPL-MCNC: <0.5 UG/L (ref 0–2.5)
CHLORIDE BLD-SCNC: 108 MMOL/L (ref 94–109)
CO2 SERPL-SCNC: 28 MMOL/L (ref 20–32)
CREAT SERPL-MCNC: 0.84 MG/DL (ref 0.52–1.04)
EOSINOPHIL # BLD AUTO: 0.3 10E3/UL (ref 0–0.7)
EOSINOPHIL NFR BLD AUTO: 3 %
ERYTHROCYTE [DISTWIDTH] IN BLOOD BY AUTOMATED COUNT: 13.4 % (ref 10–15)
GFR SERPL CREATININE-BSD FRML MDRD: 76 ML/MIN/1.73M2
GLUCOSE BLD-MCNC: 93 MG/DL (ref 70–99)
HCT VFR BLD AUTO: 40.8 % (ref 35–47)
HGB BLD-MCNC: 12.8 G/DL (ref 11.7–15.7)
IMM GRANULOCYTES # BLD: 0 10E3/UL
IMM GRANULOCYTES NFR BLD: 0 %
LYMPHOCYTES # BLD AUTO: 3 10E3/UL (ref 0.8–5.3)
LYMPHOCYTES NFR BLD AUTO: 37 %
MCH RBC QN AUTO: 29.1 PG (ref 26.5–33)
MCHC RBC AUTO-ENTMCNC: 31.4 G/DL (ref 31.5–36.5)
MCV RBC AUTO: 93 FL (ref 78–100)
MONOCYTES # BLD AUTO: 0.5 10E3/UL (ref 0–1.3)
MONOCYTES NFR BLD AUTO: 6 %
NEUTROPHILS # BLD AUTO: 4.3 10E3/UL (ref 1.6–8.3)
NEUTROPHILS NFR BLD AUTO: 54 %
NRBC # BLD AUTO: 0 10E3/UL
NRBC BLD AUTO-RTO: 0 /100
PLATELET # BLD AUTO: 246 10E3/UL (ref 150–450)
POTASSIUM BLD-SCNC: 4.2 MMOL/L (ref 3.4–5.3)
PROT SERPL-MCNC: 7.9 G/DL (ref 6.8–8.8)
RBC # BLD AUTO: 4.4 10E6/UL (ref 3.8–5.2)
SODIUM SERPL-SCNC: 142 MMOL/L (ref 133–144)
WBC # BLD AUTO: 8.1 10E3/UL (ref 4–11)

## 2022-06-01 PROCEDURE — 82378 CARCINOEMBRYONIC ANTIGEN: CPT | Performed by: INTERNAL MEDICINE

## 2022-06-01 PROCEDURE — 36415 COLL VENOUS BLD VENIPUNCTURE: CPT | Performed by: INTERNAL MEDICINE

## 2022-06-01 PROCEDURE — 86300 IMMUNOASSAY TUMOR CA 15-3: CPT | Performed by: INTERNAL MEDICINE

## 2022-06-01 PROCEDURE — 250N000011 HC RX IP 250 OP 636: Performed by: INTERNAL MEDICINE

## 2022-06-01 PROCEDURE — 85025 COMPLETE CBC W/AUTO DIFF WBC: CPT | Performed by: INTERNAL MEDICINE

## 2022-06-01 PROCEDURE — 80053 COMPREHEN METABOLIC PANEL: CPT

## 2022-06-01 PROCEDURE — 74177 CT ABD & PELVIS W/CONTRAST: CPT | Mod: GC | Performed by: RADIOLOGY

## 2022-06-01 PROCEDURE — 71260 CT THORAX DX C+: CPT | Mod: GC | Performed by: RADIOLOGY

## 2022-06-01 RX ORDER — HEPARIN SODIUM (PORCINE) LOCK FLUSH IV SOLN 100 UNIT/ML 100 UNIT/ML
5 SOLUTION INTRAVENOUS DAILY PRN
Status: DISCONTINUED | OUTPATIENT
Start: 2022-06-01 | End: 2022-06-07 | Stop reason: HOSPADM

## 2022-06-01 RX ORDER — HEPARIN SODIUM (PORCINE) LOCK FLUSH IV SOLN 100 UNIT/ML 100 UNIT/ML
500 SOLUTION INTRAVENOUS ONCE
Status: COMPLETED | OUTPATIENT
Start: 2022-06-01 | End: 2022-06-01

## 2022-06-01 RX ORDER — IOPAMIDOL 755 MG/ML
111 INJECTION, SOLUTION INTRAVASCULAR ONCE
Status: COMPLETED | OUTPATIENT
Start: 2022-06-01 | End: 2022-06-01

## 2022-06-01 RX ADMIN — Medication 5 ML: at 09:48

## 2022-06-01 RX ADMIN — IOPAMIDOL 111 ML: 755 INJECTION, SOLUTION INTRAVASCULAR at 10:16

## 2022-06-01 RX ADMIN — HEPARIN SODIUM (PORCINE) LOCK FLUSH IV SOLN 100 UNIT/ML 500 UNITS: 100 SOLUTION at 10:31

## 2022-06-01 NOTE — NURSING NOTE
Chief Complaint   Patient presents with     Port Draw     Labs drawn via port by RN in lab using 20 gauge 3/4 in power needle. Flushed with saline and heparin.      Gwen Garcia RN

## 2022-06-01 NOTE — PROGRESS NOTES
Hoda is a patient from [facility] and is seen here for continuation of care for her metastatic ER+HER2- breast cancer.  She is a [employer] refugee from [facility] and was initially seen in clinic with her daughter.  The only data we have from [facility] is an English translation of her records.       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  On 02/11/2014, she was seen by an oncologist.  The clinical staging of T4b N2 M1 was noted.   Her breast cancer was on the left side. There was no right breast cancer, confirmed by the patient and her daughter, correcting a possible error in the translated records.  ER was positive in 100% of the cells, KS at 14% and HER2 was 3+ positive.  It appears that this histopathologic information is on the mastectomy specimen. She underwent an MRI for staging of presumptive bone metastases which was performed 03/02/2014.  There were skeletal metastases in the thoracic vertebrae at 12, L1, L3, L5 and S1 vertebral body, ranging in size from 0.7-3.0 cm in size.  Ischial and right femur were also involved, as well as a large iliac mass measuring about 15 cm in the uterus. 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   Radiation Oncology consultation was performed 03/11/2014, and she was given radiation in 1 dose of 6 Gy to the right iliac lesion.        With the initial diagnosis, she initiated treatment with 6 cycles of CAF neoadjuvant chemotherapy 02/14, 07/07, 03/28, 04/18/14, 05/08 and 05/29/14. She also received monthly zoledronic acid.  She then underwent a modified left mastectomy of Palacios type and left lymphadenoectomy on 06/27/2014.   Pathologic examination showed number at [facility] was [id number] showed infiltrative grade 2 ductal cancer with 6 level 1 metastatic lymph nodes and 3 level 2 metastatic lymph nodes.  The differentiation was intermediate.  The tumor was ER positive 70% of the cells, KS  positive in 40% of the cells and HER2 was 3+ by immunohistochemistry and the Ki-67 labeling index was 20%. Her staging after surgery was stage IV, pT4b N2 M1.  I don't see a biopsy of the skeletal metastases.  She then had continuation with chemotherapy with 4 cycles of Herceptin and taxane with monthly zoledronic acid.  Tamoxifen was initiated.  She then had two years of Herceptin and a decision was made not to continue further Herceptin.  She continued on zoledronic acid every 3 months. She was clinically stable. She then moved to the [place] new refugee from [facility].  She has now been changed to letrozole.  Denosumab has now been held for new diagnosis of osteonecrosis of the R maxilla.     History of cholecystectomy 2020.    Seen in the ED on 10/4/2021, for acute abdominal pain.  Elevated LFTs, elevated lipase.  Question of stone versus pancreatitis.  She was discharged and followed up with an outpatient gastroenterology clinic.  They recommended clear liquid diet and ERCP.        TREATMENT HISTORY:  A. Initial diagnosis with metastatic breast cancer in [facility].  Neoadjuvant CAF x 6.   B. Left mastectomy. Left axillary node dissection.  C.  Radiation in 1 dose to R iliac region.  C. Herceptin for 2 years taxane for a prescribed course then stopped, monthly zoledronic acid.  She had 2 years of Herceptin with tamoxifen added after chemotherapy.  D.  Tamoxifen alone and zoledronic acid every 3 months starting 2014.  Letrozole was started   E.  [place]  We restarted Herceptin every 3 weeks and continued tamoxifen. Bone targeted agent changed to denosumab every 9 weeks. Zometa discontinued 2017.  Tamoxifen was changed to letrozole August 2019.    F.  Xgeva discontinued 12-17-19 because of dental issues.   G.  J+J vaccine March, 2021.  H.  Was on Zometa once May 11.  Reaction with eye lid swelling. Discontinued Zometal  H.  Restart Xgeva 6-22-21.  Continuing the trastuzumab and letrozole.  Both tolerated  well.       INTERVAL HISTORY   Hoda is feeling well. She saw her scan results yesterday and is happy with these. She has been feeling well with no new symptoms. No fevers/chills or infectious concerns. Energy level is intact. Appetite is intact. No nausea, abdominal pain, or bowel changes. No new or different bone pain. No SOB, CP, or edema.     She has been walking more. Eating very healthy. Her weight has stabilized. She continues to take advanced English classes 4 days per week.     PHYSICAL EXAM:  /74 (BP Location: Right arm, Patient Position: Sitting, Cuff Size: Adult Large)   Pulse 92   Temp 98.2  F (36.8  C) (Oral)   Resp 18   Wt 80.7 kg (178 lb)   SpO2 98%   BMI 31.78 kg/m    Wt Readings from Last 4 Encounters:   06/02/22 80.7 kg (178 lb)   05/12/22 81.8 kg (180 lb 4.8 oz)   04/21/22 81.3 kg (179 lb 4.8 oz)   03/31/22 81.2 kg (179 lb)     General: Alert, oriented, pleasant, NAD  HEENT: Normocephalic, atraumatic, no icterus   Neck: No cervical or supraclavicular LAD.  Axillary: No LAD  Lungs: CTA bilaterally, normal work of breathing  Cardiac: RRR  Abdomen: Soft, nontender, nondistended. Normoactive bowel sounds. No hepatosplenomegaly, masses  Neuro: CNII-XII grossly intact  Extremities: No pedal edema        Labs  Most Recent 3 CBC's:  Recent Labs   Lab Test 06/01/22  0947 05/12/22  0832 04/21/22  1230   WBC 8.1 6.3 7.2   HGB 12.8 12.2 11.8   MCV 93 95 93    211 202    Most Recent 3 BMP's:  Recent Labs   Lab Test 06/01/22  0947 05/12/22  0832 04/21/22  1230    144 144   POTASSIUM 4.2 4.3 3.9   CHLORIDE 108 109 109   CO2 28 27 29   BUN 16 10 13   CR 0.84 0.70 0.75   ANIONGAP 6 8 6   TAYLER 9.7 8.4* 8.9   GLC 93 97 106*    Most Recent 2 LFT's:  Recent Labs   Lab Test 06/01/22  0947 05/12/22  0832   AST 37 32   ALT 43 38   ALKPHOS 52 50   BILITOTAL 0.3 0.2      I reviewed the above labs today.    CT CAP 6/1/22      IMPRESSION:   1. Stable multifocal osseous metastasis.  2. Stable  pulmonary nodules, measuring up to 5 mm.  3. Stable appearance of left axillary 6 mm lymph node.  4. Hepatic steatosis.     ASSESSMENT AND PLAN:     1.  Hoda Brush is a 66-year-old woman with a history of ER-positive, CT-positive, HER2 positive breast cancer.  She is from [facility], formally from [facility], and came to live in the [place] with [place].  The tumor is ER positive, CT positive and HER2 positive.  She had metastatic disease at the time of presentation with bone-only metastases by report.  She underwent neoadjuvant CAF, had a left mastectomy, left axillary lymph node dissection, radiation to the right hip, which included the right iliac region where she had metastatic disease.  She was initially on tamoxifen but then was switched to letrozole.  She continues on this and every 3-week trastuzumab. She has had no evidence of disease progression for the last 3 years.  We have continued Herceptin every 3 weeks as well as daily letrozole.   -- Tumor markers have been stable   - CT CAP from yesterday shows stable disease.  -- Proceed with herceptin today. Continue letrozole   -- Echo next visit, scheduled     2. Bone health  - Xgeva was on hold due to possible ONJ. See Dr. Aguiar's note from 12/16--this was resumed on 1/6/22. Due in August. To be given every 3 months--this was confirmed with Dr. Aguiar.   - Continue Calcium and Vitamin D    3. Insomnia/overactive bladder   - Improved on oxybutynin 5 mg at bedtime.     Anne Lea PA-C

## 2022-06-02 ENCOUNTER — INFUSION THERAPY VISIT (OUTPATIENT)
Dept: ONCOLOGY | Facility: CLINIC | Age: 67
End: 2022-06-02
Attending: PHYSICIAN ASSISTANT
Payer: COMMERCIAL

## 2022-06-02 VITALS
WEIGHT: 178 LBS | TEMPERATURE: 98.2 F | RESPIRATION RATE: 18 BRPM | OXYGEN SATURATION: 98 % | SYSTOLIC BLOOD PRESSURE: 114 MMHG | DIASTOLIC BLOOD PRESSURE: 74 MMHG | HEART RATE: 92 BPM | BODY MASS INDEX: 31.78 KG/M2

## 2022-06-02 DIAGNOSIS — C50.919 METASTATIC BREAST CANCER: Primary | ICD-10-CM

## 2022-06-02 DIAGNOSIS — C50.912 MALIGNANT NEOPLASM OF LEFT FEMALE BREAST, UNSPECIFIED ESTROGEN RECEPTOR STATUS, UNSPECIFIED SITE OF BREAST (H): Primary | ICD-10-CM

## 2022-06-02 PROCEDURE — 96413 CHEMO IV INFUSION 1 HR: CPT

## 2022-06-02 PROCEDURE — 258N000003 HC RX IP 258 OP 636: Performed by: INTERNAL MEDICINE

## 2022-06-02 PROCEDURE — 250N000011 HC RX IP 250 OP 636: Performed by: INTERNAL MEDICINE

## 2022-06-02 PROCEDURE — G0463 HOSPITAL OUTPT CLINIC VISIT: HCPCS

## 2022-06-02 PROCEDURE — 99214 OFFICE O/P EST MOD 30 MIN: CPT | Performed by: PHYSICIAN ASSISTANT

## 2022-06-02 RX ORDER — HEPARIN SODIUM (PORCINE) LOCK FLUSH IV SOLN 100 UNIT/ML 100 UNIT/ML
5 SOLUTION INTRAVENOUS EVERY 8 HOURS
Status: DISCONTINUED | OUTPATIENT
Start: 2022-06-02 | End: 2022-06-02 | Stop reason: HOSPADM

## 2022-06-02 RX ADMIN — SODIUM CHLORIDE 250 ML: 9 INJECTION, SOLUTION INTRAVENOUS at 15:17

## 2022-06-02 RX ADMIN — TRASTUZUMAB 450 MG: 150 INJECTION, POWDER, LYOPHILIZED, FOR SOLUTION INTRAVENOUS at 15:17

## 2022-06-02 RX ADMIN — Medication 5 ML: at 15:49

## 2022-06-02 ASSESSMENT — PAIN SCALES - GENERAL: PAINLEVEL: NO PAIN (0)

## 2022-06-02 NOTE — PATIENT INSTRUCTIONS
Contact Numbers  [facility]: [phone] (for symptom and scheduling needs)    Please call the [facility] Triage line if you experience a temperature greater than or equal to 100.4, shaking chills, have uncontrolled nausea, vomiting and/or diarrhea, dizziness, shortness of breath, chest pain, bleeding, unexplained bruising, or if you have any other new/concerning symptoms, questions or concerns.     If you are having any concerning symptoms or wish to speak to a provider before your next infusion visit, please call your care coordinator or triage to notify them so we can adequately serve you.     If you need a refill on a narcotic prescription or other medication, please call triage before your infusion appointment.           June 2022 Sunday Monday Tuesday Wednesday Thursday Friday Saturday                  1    LAB PERIPHERAL   9:30 AM   (15 min.)   [facility]   [facility]    CT CHEST/ABDOMEN/PELVIS W   9:50 AM   (20 min.)   [email or web address]   [facility] [place] 2    RETURN   1:45 PM   (45 min.)   Anne Lea PA-C   [facility]    ONC INFUSION 1 HR (60 MIN)   3:00 PM   (60 min.)    ONC INFUSION NURSE   [facility] 3     4       5     6     7     8     9     10     11       12     13     14     15     16     17     18       19     20     21     22    ECHO COMPLETE   8:30 AM   (60 min.)   [email or web address]   [facility] [place] 23    LAB PERIPHERAL   8:15 AM   (15 min.)   [facility]   [facility]    RETURN   8:55 AM   (30 min.)   Lisandro Aguiar MD   [facility]    ONC INFUSION 1 HR (60 MIN)  10:00 AM   (60 min.)    ONC INFUSION NURSE   [facility] 24     25       26     27     28     29     30                             July 2022 Sunday Monday Tuesday  Wednesday Thursday Friday Saturday                            1     2       3     4     5     6     7     8     9       10     11     12     13     14     15     16       17     18     19     20     21     22     23       24     25     26     27     28     29     30       31                                                     Lab Results:  No results found for this or any previous visit (from the past 12 hour(s)).

## 2022-06-02 NOTE — NURSING NOTE
"Oncology Rooming Note    June 2, 2022 1:39 PM   Hoda Brush is a 67 year old female who presents for:    Chief Complaint   Patient presents with     Oncology Clinic Visit     Rtn for breast cancer     Initial Vitals: /74 (BP Location: Right arm, Patient Position: Sitting, Cuff Size: Adult Large)   Pulse 92   Temp 98.2  F (36.8  C) (Oral)   Resp 18   Wt 80.7 kg (178 lb)   SpO2 98%   BMI 31.78 kg/m   Estimated body mass index is 31.78 kg/m  as calculated from the following:    Height as of 3/7/22: 1.594 m (5' 2.75\").    Weight as of this encounter: 80.7 kg (178 lb). Body surface area is 1.89 meters squared.  No Pain (0) Comment: Data Unavailable   No LMP recorded. Patient is postmenopausal.  Allergies reviewed: Yes  Medications reviewed: Yes    Medications: Medication refills not needed today.  Pharmacy name entered into [employer]: [id number] - [place], [place] - [address]    Clinical concerns: none       Marilee Humphrey, EMT            "

## 2022-06-02 NOTE — LETTER
6/2/2022         RE: Hoda Brush  [address]  [place] [address]          Hoda is a patient from [facility] and is seen here for continuation of care for her metastatic ER+HER2- breast cancer.  She is a [employer] refugee from [facility] and was initially seen in clinic with her daughter.  The only data we have from [facility] is an English translation of her records.       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  On 02/11/2014, she was seen by an oncologist.  The clinical staging of T4b N2 M1 was noted.   Her breast cancer was on the left side. There was no right breast cancer, confirmed by the patient and her daughter, correcting a possible error in the translated records.  ER was positive in 100% of the cells, NM at 14% and HER2 was 3+ positive.  It appears that this histopathologic information is on the mastectomy specimen. She underwent an MRI for staging of presumptive bone metastases which was performed 03/02/2014.  There were skeletal metastases in the thoracic vertebrae at 12, L1, L3, L5 and S1 vertebral body, ranging in size from 0.7-3.0 cm in size.  Ischial and right femur were also involved, as well as a large iliac mass measuring about 15 cm in the uterus. 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   Radiation Oncology consultation was performed 03/11/2014, and she was given radiation in 1 dose of 6 Gy to the right iliac lesion.        With the initial diagnosis, she initiated treatment with 6 cycles of CAF neoadjuvant chemotherapy 02/14, 07/07, 03/28, 04/18/14, 05/08 and 05/29/14. She also received monthly zoledronic acid.  She then underwent a modified left mastectomy of Palacios type and left lymphadenoectomy on 06/27/2014.   Pathologic examination showed number at [facility] was [id number] showed infiltrative grade 2 ductal cancer with 6 level 1 metastatic lymph nodes and 3 level 2 metastatic  lymph nodes.  The differentiation was intermediate.  The tumor was ER positive 70% of the cells, NV positive in 40% of the cells and HER2 was 3+ by immunohistochemistry and the Ki-67 labeling index was 20%. Her staging after surgery was stage IV, pT4b N2 M1.  I don't see a biopsy of the skeletal metastases.  She then had continuation with chemotherapy with 4 cycles of Herceptin and taxane with monthly zoledronic acid.  Tamoxifen was initiated.  She then had two years of Herceptin and a decision was made not to continue further Herceptin.  She continued on zoledronic acid every 3 months. She was clinically stable. She then moved to the [place] new refugee from [facility].  She has now been changed to letrozole.  Denosumab has now been held for new diagnosis of osteonecrosis of the R maxilla.     History of cholecystectomy 2020.    Seen in the ED on 10/4/2021, for acute abdominal pain.  Elevated LFTs, elevated lipase.  Question of stone versus pancreatitis.  She was discharged and followed up with an outpatient gastroenterology clinic.  They recommended clear liquid diet and ERCP.        TREATMENT HISTORY:  A. Initial diagnosis with metastatic breast cancer in [facility].  Neoadjuvant CAF x 6.   B. Left mastectomy. Left axillary node dissection.  C.  Radiation in 1 dose to R iliac region.  C. Herceptin for 2 years taxane for a prescribed course then stopped, monthly zoledronic acid.  She had 2 years of Herceptin with tamoxifen added after chemotherapy.  D.  Tamoxifen alone and zoledronic acid every 3 months starting 2014.  Letrozole was started   E.  [place]  We restarted Herceptin every 3 weeks and continued tamoxifen. Bone targeted agent changed to denosumab every 9 weeks. Zometa discontinued 2017.  Tamoxifen was changed to letrozole August 2019.    F.  Xgeva discontinued 12-17-19 because of dental issues.   G.  J+J vaccine March, 2021.  H.  Was on Zometa once May 11.  Reaction with eye lid swelling.  Discontinued Zometal  H.  Restart Xgeva 6-22-21.  Continuing the trastuzumab and letrozole.  Both tolerated well.       INTERVAL HISTORY   Hoda is feeling well. She saw her scan results yesterday and is happy with these. She has been feeling well with no new symptoms. No fevers/chills or infectious concerns. Energy level is intact. Appetite is intact. No nausea, abdominal pain, or bowel changes. No new or different bone pain. No SOB, CP, or edema.     She has been walking more. Eating very healthy. Her weight has stabilized. She continues to take advanced English classes 4 days per week.     PHYSICAL EXAM:  /74 (BP Location: Right arm, Patient Position: Sitting, Cuff Size: Adult Large)   Pulse 92   Temp 98.2  F (36.8  C) (Oral)   Resp 18   Wt 80.7 kg (178 lb)   SpO2 98%   BMI 31.78 kg/m    Wt Readings from Last 4 Encounters:   06/02/22 80.7 kg (178 lb)   05/12/22 81.8 kg (180 lb 4.8 oz)   04/21/22 81.3 kg (179 lb 4.8 oz)   03/31/22 81.2 kg (179 lb)     General: Alert, oriented, pleasant, NAD  HEENT: Normocephalic, atraumatic, no icterus   Neck: No cervical or supraclavicular LAD.  Axillary: No LAD  Lungs: CTA bilaterally, normal work of breathing  Cardiac: RRR  Abdomen: Soft, nontender, nondistended. Normoactive bowel sounds. No hepatosplenomegaly, masses  Neuro: CNII-XII grossly intact  Extremities: No pedal edema        Labs  Most Recent 3 CBC's:  Recent Labs   Lab Test 06/01/22  0947 05/12/22  0832 04/21/22  1230   WBC 8.1 6.3 7.2   HGB 12.8 12.2 11.8   MCV 93 95 93    211 202    Most Recent 3 BMP's:  Recent Labs   Lab Test 06/01/22  0947 05/12/22  0832 04/21/22  1230    144 144   POTASSIUM 4.2 4.3 3.9   CHLORIDE 108 109 109   CO2 28 27 29   BUN 16 10 13   CR 0.84 0.70 0.75   ANIONGAP 6 8 6   TAYLER 9.7 8.4* 8.9   GLC 93 97 106*    Most Recent 2 LFT's:  Recent Labs   Lab Test 06/01/22  0947 05/12/22  0832   AST 37 32   ALT 43 38   ALKPHOS 52 50   BILITOTAL 0.3 0.2      I reviewed the above  labs today.    CT CAP 6/1/22      IMPRESSION:   1. Stable multifocal osseous metastasis.  2. Stable pulmonary nodules, measuring up to 5 mm.  3. Stable appearance of left axillary 6 mm lymph node.  4. Hepatic steatosis.     ASSESSMENT AND PLAN:     1.  Hoda Brush is a 66-year-old woman with a history of ER-positive, IA-positive, HER2 positive breast cancer.  She is from [facility], formally from [facility], and came [place] live in the [place] with [place].  The tumor is ER positive, IA positive and HER2 positive.  She had metastatic disease at the time of presentation with bone-only metastases by report.  She underwent neoadjuvant CAF, had a left mastectomy, left axillary lymph node dissection, radiation to the right hip, which included the right iliac region where she had metastatic disease.  She was initially on tamoxifen but then was switched to letrozole.  She continues on this and every 3-week trastuzumab. She has had no evidence of disease progression for the last 3 years.  We have continued Herceptin every 3 weeks as well as daily letrozole.   -- Tumor markers have been stable   - CT CAP from yesterday shows stable disease.  -- Proceed with herceptin today. Continue letrozole   -- Echo next visit, scheduled     2. Bone health  - Xgeva was on hold due to possible ONJ. See Dr. Aguiar's note from 12/16--this was resumed on 1/6/22. Due in August. To be given every 3 months--this was confirmed with Dr. Aguiar.   - Continue Calcium and Vitamin D    3. Insomnia/overactive bladder   - Improved on oxybutynin 5 mg at bedtime.     JUSTINE Crockett PA-C

## 2022-06-02 NOTE — PROGRESS NOTES
Infusion Nursing Note:  Hoda Brush presents today for Cycle 81 Day 1 Herceptin.   Patient seen by provider today: Yes: SEMAJ Crockett   present during visit today: Not Applicable.    Note: Patient presents to infusion today doing well. Denies any new questions or concerns following her visit with SEMAJ Crockett.     Intravenous Access:  Implanted Port.    Treatment Conditions:  Lab Results   Component Value Date    HGB 12.8 06/01/2022    WBC 8.1 06/01/2022    ANEU 3.1 06/22/2021    ANEUTAUTO 4.3 06/01/2022     06/01/2022      Lab Results   Component Value Date     06/01/2022    POTASSIUM 4.2 06/01/2022    CR 0.84 06/01/2022    TAYLER 9.7 06/01/2022    BILITOTAL 0.3 06/01/2022    ALBUMIN 3.5 06/01/2022    ALT 43 06/01/2022    AST 37 06/01/2022     Results reviewed from 6/1, labs MET treatment parameters, ok to proceed with treatment.  ECHO/MUGA completed 3/9/2022 EF 60-65%. Next ECHO scheduled for 6/22/2022.    Post Infusion Assessment:  Patient tolerated infusion without incident.  Blood return noted pre and post infusion.  Site patent and intact, free from redness, edema or discomfort.  No evidence of extravasations.  Access discontinued per protocol.     Discharge Plan:   Patient declined prescription refills.  Discharge instructions reviewed with: Patient.  Patient and/or family verbalized understanding of discharge instructions and all questions answered.  AVS to patient via [employer].  Patient will return 6/23 for next appointment.   Patient discharged in stable condition accompanied by: self.  Departure Mode: Ambulatory.      Tierra Villa RN

## 2022-06-19 NOTE — PROGRESS NOTES
Hoda is a patient from [facility] and is seen here for continuation of care for her metastatic ER+HER2- breast cancer.  She is a refugee from [facility] and was initially seen in clinic with her daughter.  The only data we have from [facility] is an English translation of her records.       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  On 02/11/2014, she was seen by an oncologist.  The clinical staging of T4b N2 M1 was noted.   Her breast cancer was on the left side. There was no right breast cancer, confirmed by the patient and her daughter, correcting a possible error in the translated records.  ER was positive in 100% of the cells, AL at 14% and HER2 was 3+ positive.  It appears that this histopathologic information is on the mastectomy specimen. She underwent an MRI for staging of presumptive bone metastases which was performed 03/02/2014.  There were skeletal metastases in the thoracic vertebrae at 12, L1, L3, L5 and S1 vertebral body, ranging in size from 0.7-3.0 cm in size.  Ischial and right femur were also involved, as well as a large iliac mass measuring about 15 cm in the uterus. 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   Radiation Oncology consultation was performed 03/11/2014, and she was given radiation in 1 dose of 6 Gy to the right iliac lesion.        With the initial diagnosis, she initiated treatment with 6 cycles of CAF neoadjuvant chemotherapy 02/14, 07/07, 03/28, 04/18/14, 05/08 and 05/29/14. She also received monthly zoledronic acid.  She then underwent a modified left mastectomy of Palacios type and left lymphadenoectomy on 06/27/2014.   Pathologic examination showed number at [facility] was [id number] showed infiltrative grade 2 ductal cancer with 6 level 1 metastatic lympFollow up with Jossie for visits and trastuzumab on 2-5, 2-26, 3-19 with CBC, CMP.  Echocardiogram on 3-19.  Follow up with me 4-9 with CBC,  CMP, CA27.29 and CEA and with CT CAP on 4-8.h nodes and 3 level 2 metastatic lymph nodes.  The differentiation was intermediate.  The tumor was ER positive 70% of the cells, CA positive in 40% of the cells and HER2 was 3+ by immunohistochemistry and the Ki-67 labeling index was 20%. Her staging after surgery was stage IV, pT4b N2 M1.  I don't see a biopsy of the skeletal metastases.  She then had continuation with chemotherapy with 4 cycles of Herceptin and taxane with monthly zoledronic acid.  Tamoxifen was initiated.  She then had two years of Herceptin and a decision was made not to continue further Herceptin.  She continued on zoledronic acid every 3 months. She was clinically stable. [place] then moved to the [place] new refugee from [facility].  She has now been changed to letrozole.  Denosumab has now been held for new diagnosis of osteonecrosis of the R maxilla.     History of cholecystectomy 2020.      TREATMENT HISTORY:  A. Initial diagnosis with metastatic breast cancer in [facility].  Neoadjuvant CAF x 6.   B. Left mastectomy. Left axillary node dissection.  C.  Radiation in 1 dose to R iliac region.  C. Herceptin for 2 years taxane for a prescribed course then stopped, monthly zoledronic acid.  She had 2 years of Herceptin with tamoxifen added after chemotherapy.  D.  Tamoxifen alone and zoledronic acid every 3 months starting 2014.  Letrozole was started   E[place]  [place]  We restarted Herceptin every 3 weeks and continued tamoxifen. Bone targeted agent changed to denosumab every 9 weeks. Zometa discontinued 2017.  Tamoxifen was changed to letrozole August 2019.    F.  Xgeva discontinued 12-17-19 because of dental issues.   G.  J+J vaccine March, 2021.  H.  Was on Zometa once May 11.  Reaction with eye lid swelling. Discontinued Zometal  H.  Restart Xgeva 6-22-21.  Continuing the trastuzumab and letrozole.  Both tolerated well.       INTERVAL HISTORY   Hoda returns to clinic today and has been feeling  generally well.  She denies pain, fatigue, depression or anxiety.     She is eating a healthful diet, Mediterranean style.  She is exercising and trying to get to 150 minutes a week.  She does have a treadmill at home.  She does not drink alcohol.  She takes vitamin D and calcium and she has had COVID vaccination x3 as well as Pneumovax.    She is eating a healthful diet.  She has been exercising.  She drinks no alcohol.  She is taking calcium and vitamin D.    She has no biliary complaints and no right upper quadrant tenderness.  All of the biliary symptoms have resolved.        REVIEW OF SYSTEMS:  The remainder of a 10 point ROS was negative.      PHYSICAL EXAM:   /66   Pulse 78   Temp 97.8  F (36.6  C) (Oral)   Resp 16   Wt 80.7 kg (178 lb)   SpO2 97%   BMI 31.78 kg/m    GENERAL:  Hoda appeared generally well.  LYMPH:  No cervical, supraclavicular, subclavicular or axillary lymphadenopathy.  ENT:  Oropharynx is without lesions.  BREASTS:  Breast exam not performed today.  LUNGS:  Clear to percussion and auscultation.  HEART:  Regular rate and rhythm.  S1, S2.  ABDOMEN:  Soft, nontender, without hepatosplenomegaly.  EXTREMITIES:  Without edema.  PSYCH:  Mood and affect were normal.    LABORATORY DATA:  CBC and CMP were within normal limits.  Echocardiogram:  Ejection fraction is 55%-60%.  CT of the chest, abdomen and pelvis from 06/01 showed stable multifocal osseous metastases, stable pulmonary nodules measuring up to 5 mm, and stable appearance of left axillary 6 mm lymph node.  Hepatic steatosis was also noted.        Imaging   CT CAP  March 9, 2022  IMPRESSION: Continued appearance of left mastectomy, bilateral  pulmonary nodules, osteoblastic metastases an 5 mm short axis left  axillary lymph node. Cholecystectomy. All findings unchanged.     LELA CHENG MD     Last 3 echocardiograms  6-22-22   Global and regional left ventricular function is normal with an EF of 55-60%. Left ventricular  size is normal.    3-9-22  Left ventricular size, wall motion and function are normal. The ejection  fraction is 60-65%.  11-30-21   Global and regional left ventricular function is normal with an EF of 55-60%.  Left ventricular diastolic function is normal.         ASSESSMENT AND PLAN:     1.  Hoda Brush is a 67-year-old woman with a history of ER-positive, LA-positive, HER2 positive breast cancer.  She is from [facility], formally from [facility], and came to live in the [place] with [place].  The tumor is ER positive, LA positive and HER2 positive.  She had metastatic disease at the time of presentation with bone-only metastases by report.  She underwent neoadjuvant CAF, had a left mastectomy, left axillary lymph node dissection, radiation to the right hip, which included the right iliac region where she had metastatic disease.  She was initially on tamoxifen but then was switched to letrozole.  She continues on this and every 3-week trastuzumab.  She has had no evidence of disease progression for the last 3 years.  Markers are low and stable.  We have continued Herceptin every 3 weeks as well as daily letrozole. CT CAP shows stable pulmonary nodules.  The left axillary lymph node is decreased compared with the prior CT.  2.  Hoda Brush continues to do well.  The biliary symptoms have now resolved.  She continues to do well and will continue with her current plan of Herceptin, letrozole and Xgeva which will be held for now pending dental work.  3.  Continue the letrozole.  Letrozole has been well-tolerated.  No significant joint stiffness.  4.  Echo. Stable EF. --Echo in  showed normal EF without change 60 to 65%. 2-16-21.  5.  Discussion of bone health and right maxillary osteonecrosis.  She will continue with calcium and vitamin D.  She will have a decision on root canal on July 6 so we will hold the Xgeva for now.  6. Follow up. She will have a decision on root canal on July 6 so we will  hold the Xgeva for now. Herceptin 7-14, 8-4, 8-25, 9-15. Visit with Jamee Mckeon 8-4 and with me 9-15. CBC, CMP all visits.      Thank you for allowing us to participate in this patient's care.      Sincerely,     Lisandro Aguiar M.D.   Professor   Division of Hematology, Oncology, Transplant   Department of Medicine   [employer] [facility] [employer]   [phone]           I spent 35 minutes with the patient more than 50% of which was in counseling and coordination of care.

## 2022-06-22 ENCOUNTER — ANCILLARY PROCEDURE (OUTPATIENT)
Dept: CARDIOLOGY | Facility: CLINIC | Age: 67
End: 2022-06-22
Attending: INTERNAL MEDICINE
Payer: COMMERCIAL

## 2022-06-22 DIAGNOSIS — Z51.11 ENCOUNTER FOR ANTINEOPLASTIC CHEMOTHERAPY: ICD-10-CM

## 2022-06-22 LAB
3D LVEF ECHO: NORMAL
LVEF ECHO: NORMAL

## 2022-06-22 PROCEDURE — 99207 PR NO BILLABLE SERVICE THIS VISIT: CPT | Performed by: INTERNAL MEDICINE

## 2022-06-22 PROCEDURE — 93306 TTE W/DOPPLER COMPLETE: CPT | Performed by: INTERNAL MEDICINE

## 2022-06-23 ENCOUNTER — APPOINTMENT (OUTPATIENT)
Dept: LAB | Facility: CLINIC | Age: 67
End: 2022-06-23
Attending: INTERNAL MEDICINE
Payer: COMMERCIAL

## 2022-06-23 ENCOUNTER — INFUSION THERAPY VISIT (OUTPATIENT)
Dept: ONCOLOGY | Facility: CLINIC | Age: 67
End: 2022-06-23
Attending: INTERNAL MEDICINE
Payer: COMMERCIAL

## 2022-06-23 VITALS
BODY MASS INDEX: 31.78 KG/M2 | WEIGHT: 178 LBS | HEART RATE: 78 BPM | TEMPERATURE: 97.8 F | SYSTOLIC BLOOD PRESSURE: 113 MMHG | OXYGEN SATURATION: 97 % | RESPIRATION RATE: 16 BRPM | DIASTOLIC BLOOD PRESSURE: 66 MMHG

## 2022-06-23 DIAGNOSIS — C50.912 MALIGNANT NEOPLASM OF LEFT FEMALE BREAST, UNSPECIFIED ESTROGEN RECEPTOR STATUS, UNSPECIFIED SITE OF BREAST (H): Primary | ICD-10-CM

## 2022-06-23 DIAGNOSIS — C79.51 BONE METASTASIS: Primary | ICD-10-CM

## 2022-06-23 DIAGNOSIS — C50.912 MALIGNANT NEOPLASM OF LEFT FEMALE BREAST, UNSPECIFIED ESTROGEN RECEPTOR STATUS, UNSPECIFIED SITE OF BREAST (H): ICD-10-CM

## 2022-06-23 LAB
ALBUMIN SERPL-MCNC: 3.5 G/DL (ref 3.4–5)
ALP SERPL-CCNC: 50 U/L (ref 40–150)
ALT SERPL W P-5'-P-CCNC: 41 U/L (ref 0–50)
ANION GAP SERPL CALCULATED.3IONS-SCNC: 8 MMOL/L (ref 3–14)
AST SERPL W P-5'-P-CCNC: 30 U/L (ref 0–45)
BASOPHILS # BLD AUTO: 0 10E3/UL (ref 0–0.2)
BASOPHILS NFR BLD AUTO: 1 %
BILIRUB SERPL-MCNC: 0.3 MG/DL (ref 0.2–1.3)
BUN SERPL-MCNC: 17 MG/DL (ref 7–30)
CALCIUM SERPL-MCNC: 9.3 MG/DL (ref 8.5–10.1)
CEA SERPL-MCNC: 1.1 NG/ML
CHLORIDE BLD-SCNC: 107 MMOL/L (ref 94–109)
CO2 SERPL-SCNC: 28 MMOL/L (ref 20–32)
CREAT SERPL-MCNC: 0.79 MG/DL (ref 0.52–1.04)
EOSINOPHIL # BLD AUTO: 0.4 10E3/UL (ref 0–0.7)
EOSINOPHIL NFR BLD AUTO: 5 %
ERYTHROCYTE [DISTWIDTH] IN BLOOD BY AUTOMATED COUNT: 13.7 % (ref 10–15)
GFR SERPL CREATININE-BSD FRML MDRD: 82 ML/MIN/1.73M2
GLUCOSE BLD-MCNC: 103 MG/DL (ref 70–99)
HCT VFR BLD AUTO: 40.4 % (ref 35–47)
HGB BLD-MCNC: 12.6 G/DL (ref 11.7–15.7)
IMM GRANULOCYTES # BLD: 0 10E3/UL
IMM GRANULOCYTES NFR BLD: 0 %
LYMPHOCYTES # BLD AUTO: 2.6 10E3/UL (ref 0.8–5.3)
LYMPHOCYTES NFR BLD AUTO: 36 %
MCH RBC QN AUTO: 29.2 PG (ref 26.5–33)
MCHC RBC AUTO-ENTMCNC: 31.2 G/DL (ref 31.5–36.5)
MCV RBC AUTO: 94 FL (ref 78–100)
MONOCYTES # BLD AUTO: 0.5 10E3/UL (ref 0–1.3)
MONOCYTES NFR BLD AUTO: 6 %
NEUTROPHILS # BLD AUTO: 3.9 10E3/UL (ref 1.6–8.3)
NEUTROPHILS NFR BLD AUTO: 52 %
NRBC # BLD AUTO: 0 10E3/UL
NRBC BLD AUTO-RTO: 0 /100
PLATELET # BLD AUTO: 198 10E3/UL (ref 150–450)
POTASSIUM BLD-SCNC: 4.1 MMOL/L (ref 3.4–5.3)
PROT SERPL-MCNC: 7.9 G/DL (ref 6.8–8.8)
RBC # BLD AUTO: 4.32 10E6/UL (ref 3.8–5.2)
SODIUM SERPL-SCNC: 143 MMOL/L (ref 133–144)
WBC # BLD AUTO: 7.4 10E3/UL (ref 4–11)

## 2022-06-23 PROCEDURE — 250N000011 HC RX IP 250 OP 636: Performed by: INTERNAL MEDICINE

## 2022-06-23 PROCEDURE — 96413 CHEMO IV INFUSION 1 HR: CPT

## 2022-06-23 PROCEDURE — 258N000003 HC RX IP 258 OP 636: Performed by: INTERNAL MEDICINE

## 2022-06-23 PROCEDURE — 36415 COLL VENOUS BLD VENIPUNCTURE: CPT | Performed by: INTERNAL MEDICINE

## 2022-06-23 PROCEDURE — 82040 ASSAY OF SERUM ALBUMIN: CPT | Performed by: INTERNAL MEDICINE

## 2022-06-23 PROCEDURE — 99214 OFFICE O/P EST MOD 30 MIN: CPT | Performed by: INTERNAL MEDICINE

## 2022-06-23 PROCEDURE — 80053 COMPREHEN METABOLIC PANEL: CPT | Performed by: INTERNAL MEDICINE

## 2022-06-23 PROCEDURE — 84999 UNLISTED CHEMISTRY PROCEDURE: CPT

## 2022-06-23 PROCEDURE — G0463 HOSPITAL OUTPT CLINIC VISIT: HCPCS

## 2022-06-23 PROCEDURE — 85025 COMPLETE CBC W/AUTO DIFF WBC: CPT | Performed by: INTERNAL MEDICINE

## 2022-06-23 PROCEDURE — 82378 CARCINOEMBRYONIC ANTIGEN: CPT | Performed by: INTERNAL MEDICINE

## 2022-06-23 PROCEDURE — 86300 IMMUNOASSAY TUMOR CA 15-3: CPT

## 2022-06-23 RX ORDER — HEPARIN SODIUM (PORCINE) LOCK FLUSH IV SOLN 100 UNIT/ML 100 UNIT/ML
5 SOLUTION INTRAVENOUS ONCE
Status: COMPLETED | OUTPATIENT
Start: 2022-06-23 | End: 2022-06-23

## 2022-06-23 RX ORDER — HEPARIN SODIUM (PORCINE) LOCK FLUSH IV SOLN 100 UNIT/ML 100 UNIT/ML
5 SOLUTION INTRAVENOUS EVERY 8 HOURS
Status: DISCONTINUED | OUTPATIENT
Start: 2022-06-23 | End: 2022-06-23 | Stop reason: HOSPADM

## 2022-06-23 RX ADMIN — SODIUM CHLORIDE 250 ML: 9 INJECTION, SOLUTION INTRAVENOUS at 11:00

## 2022-06-23 RX ADMIN — Medication 5 ML: at 11:46

## 2022-06-23 RX ADMIN — TRASTUZUMAB 450 MG: 150 INJECTION, POWDER, LYOPHILIZED, FOR SOLUTION INTRAVENOUS at 11:00

## 2022-06-23 RX ADMIN — Medication 5 ML: at 08:49

## 2022-06-23 ASSESSMENT — PAIN SCALES - GENERAL: PAINLEVEL: NO PAIN (0)

## 2022-06-23 NOTE — PROGRESS NOTES
Infusion Nursing Note:  Hoda Brush presents today for Cycle 82 Day 1 Herceptin.   Patient seen by provider today: Yes, Dr. Aguiar   present during visit today: Patient refused  services and a waiver form has been signed today.     Note: Patient presents to the infusion center today after her provider appt.    Intravenous Access:  Implanted Port.    Treatment Conditions:   Latest Reference Range & Units 06/23/22 08:55   Sodium 133 - 144 mmol/L 143   Potassium 3.4 - 5.3 mmol/L 4.1   Chloride 94 - 109 mmol/L 107   Carbon Dioxide 20 - 32 mmol/L 28   Urea Nitrogen 7 - 30 mg/dL 17   Creatinine 0.52 - 1.04 mg/dL 0.79   GFR Estimate >60 mL/min/1.73m2 82   Calcium 8.5 - 10.1 mg/dL 9.3   Anion Gap 3 - 14 mmol/L 8   Albumin 3.4 - 5.0 g/dL 3.5   Protein Total 6.8 - 8.8 g/dL 7.9   Bilirubin Total 0.2 - 1.3 mg/dL 0.3   Alkaline Phosphatase 40 - 150 U/L 50   ALT 0 - 50 U/L 41   AST 0 - 45 U/L 30   Glucose 70 - 99 mg/dL 103 (H)   WBC 4.0 - 11.0 10e3/uL 7.4   Hemoglobin 11.7 - 15.7 g/dL 12.6   Hematocrit 35.0 - 47.0 % 40.4   Platelet Count 150 - 450 10e3/uL 198   RBC Count 3.80 - 5.20 10e6/uL 4.32   MCV 78 - 100 fL 94   MCH 26.5 - 33.0 pg 29.2   MCHC 31.5 - 36.5 g/dL 31.2 (L)   RDW 10.0 - 15.0 % 13.7   % Neutrophils % 52   % Lymphocytes % 36   % Monocytes % 6   % Eosinophils % 5   % Basophils % 1   Absolute Basophils 0.0 - 0.2 10e3/uL 0.0   Absolute Eosinophils 0.0 - 0.7 10e3/uL 0.4   Absolute Immature Granulocytes <=0.4 10e3/uL 0.0   Absolute Lymphocytes 0.8 - 5.3 10e3/uL 2.6   Absolute Monocytes 0.0 - 1.3 10e3/uL 0.5   % Immature Granulocytes % 0   Absolute Neutrophils 1.6 - 8.3 10e3/uL 3.9   Absolute NRBCs 10e3/uL 0.0   NRBCs per 100 WBC <1 /100 0     ECHO completed 6/22/22  EF 55-60%.    Results reviewed, labs MET treatment parameters, ok to proceed with treatment.    Post Infusion Assessment:  Patient tolerated infusion without incident.  Blood return noted pre and post infusion.  No evidence of  extravasations.  Access discontinued per protocol.     Discharge Plan:   Patient declined prescription refills.  Discharge instructions reviewed with: Patient.  Patient and/or family verbalized understanding of discharge instructions and all questions answered.  AVS to patient via [employer].  Patient will return 7/14 for next appointment. Scheduling to call the patient once a date and time has been confirmed.  Patient discharged in stable condition accompanied by: self.  Departure Mode: Ambulatory.      Roselyn Chi RN

## 2022-06-23 NOTE — NURSING NOTE
"Oncology Rooming Note    June 23, 2022 9:08 AM   Hoda Brush is a 67 year old female who presents for:    Chief Complaint   Patient presents with     Port Draw     Labs drawn via port by RN. VS taken.     Oncology Clinic Visit     Metastatic breast cancer (H)     Initial Vitals: /66   Pulse 78   Temp 97.8  F (36.6  C) (Oral)   Resp 16   Wt 80.7 kg (178 lb)   SpO2 97%   BMI 31.78 kg/m   Estimated body mass index is 31.78 kg/m  as calculated from the following:    Height as of 3/7/22: 1.594 m (5' 2.75\").    Weight as of this encounter: 80.7 kg (178 lb). Body surface area is 1.89 meters squared.  No Pain (0) Comment: Data Unavailable   No LMP recorded. Patient is postmenopausal.  Allergies reviewed: Yes  Medications reviewed: Yes    Medications: Medication refills not needed today.  Pharmacy name entered into [employer]: [id number] - [place], [place] - [address]    Clinical concerns: No major changes reported today. In-person  present for today's visit in clinic (Kristian).        Carly Plummer LPN June 23, 2022 9:08 AM              "

## 2022-06-23 NOTE — LETTER
6/23/2022         RE: Hoda Brush  [address]  [place] [address]        Dear Colleague,    Thank you for referring your patient, Hoda Brush, to the [facility]. Please see a copy of my visit note below.    Hoda is a patient from [facility] and is seen here for continuation of care for her metastatic ER+HER2- breast cancer.  She is a refugee from [facility] and was initially seen in clinic with her daughter.  The only data we have from [facility] is an English translation of her records.       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  On 02/11/2014, she was seen by an oncologist.  The clinical staging of T4b N2 M1 was noted.   Her breast cancer was on the left side. There was no right breast cancer, confirmed by the patient and her daughter, correcting a possible error in the translated records.  ER was positive in 100% of the cells, RI at 14% and HER2 was 3+ positive.  It appears that this histopathologic information is on the mastectomy specimen. She underwent an MRI for staging of presumptive bone metastases which was performed 03/02/2014.  There were skeletal metastases in the thoracic vertebrae at 12, L1, L3, L5 and S1 vertebral body, ranging in size from 0.7-3.0 cm in size.  Ischial and right femur were also involved, as well as a large iliac mass measuring about 15 cm in the uterus. 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   Radiation Oncology consultation was performed 03/11/2014, and she was given radiation in 1 dose of 6 Gy to the right iliac lesion.        With the initial diagnosis, she initiated treatment with 6 cycles of CAF neoadjuvant chemotherapy 02/14, 07/07, 03/28, 04/18/14, 05/08 and 05/29/14. She also received monthly zoledronic acid.  She then underwent a modified left mastectomy of Palacios type and left lymphadenoectomy on 06/27/2014.   Pathologic  examination showed number at [facility] was [id number] showed infiltrative grade 2 ductal cancer with 6 level 1 metastatic lympFollow up with Jossie for visits and trastuzumab on 2-5, 2-26, 3-19 with CBC, CMP.  Echocardiogram on 3-19.  Follow up with me 4-9 with CBC, CMP, CA27.29 and CEA and with CT CAP on 4-8.h nodes and 3 level 2 metastatic lymph nodes.  The differentiation was intermediate.  The tumor was ER positive 70% of the cells, WA positive in 40% of the cells and HER2 was 3+ by immunohistochemistry and the Ki-67 labeling index was 20%. Her staging after surgery was stage IV, pT4b N2 M1.  I don't see a biopsy of the skeletal metastases.  She then had continuation with chemotherapy with 4 cycles of Herceptin and taxane with monthly zoledronic acid.  Tamoxifen was initiated.  She then had two years of Herceptin and a decision was made not to continue further Herceptin.  She continued on zoledronic acid every 3 months. She was clinically stable. [place] then [place] the [place] new [place] from [facility].  She has now been changed to letrozole.  Denosumab has now been held for new diagnosis of osteonecrosis of the R maxilla.     History of cholecystectomy 2020.      TREATMENT HISTORY:  A. Initial diagnosis with metastatic breast cancer in [facility].  Neoadjuvant CAF x 6.   B. Left mastectomy. Left axillary node dissection.  C.  Radiation in 1 dose to R iliac region.  C. Herceptin for 2 years taxane for a prescribed course then stopped, monthly zoledronic acid.  She had 2 years of Herceptin with tamoxifen added after chemotherapy.  D.  Tamoxifen alone and zoledronic acid every 3 months starting 2014.  Letrozole was started   E.  [place]  We restarted Herceptin every 3 weeks and continued tamoxifen. Bone targeted agent changed to denosumab every 9 weeks. Zometa discontinued 2017.  Tamoxifen was changed to letrozole August 2019.    GIANLUCA  Xgeva discontinued 12-17-19 because of dental issues.   G.  J+J vaccine  March, 2021.  HRadhika  Was on Zometa once May 11.  Reaction with eye lid swelling. Discontinued Zometal  H.  Restart Xgeva 6-22-21.  Continuing the trastuzumab and letrozole.  Both tolerated well.       INTERVAL HISTORY   Hoda returns to clinic today and has been feeling generally well.  She denies pain, fatigue, depression or anxiety.     She is eating a healthful diet, Mediterranean style.  She is exercising and trying to get to 150 minutes a week.  She does have a treadmill at home.  She does not drink alcohol.  She takes vitamin D and calcium and she has had COVID vaccination x3 as well as Pneumovax.    She is eating a healthful diet.  She has been exercising.  She drinks no alcohol.  She is taking calcium and vitamin D.    She has no biliary complaints and no right upper quadrant tenderness.  All of the biliary symptoms have resolved.        REVIEW OF SYSTEMS:  The remainder of a 10 point ROS was negative.      PHYSICAL EXAM:   /66   Pulse 78   Temp 97.8  F (36.6  C) (Oral)   Resp 16   Wt 80.7 kg (178 lb)   SpO2 97%   BMI 31.78 kg/m    GENERAL:  Hoda appeared generally well.  LYMPH:  No cervical, supraclavicular, subclavicular or axillary lymphadenopathy.  ENT:  Oropharynx is without lesions.  BREASTS:  Breast exam not performed today.  LUNGS:  Clear to percussion and auscultation.  HEART:  Regular rate and rhythm.  S1, S2.  ABDOMEN:  Soft, nontender, without hepatosplenomegaly.  EXTREMITIES:  Without edema.  PSYCH:  Mood and affect were normal.    LABORATORY DATA:  CBC and CMP were within normal limits.  Echocardiogram:  Ejection fraction is 55%-60%.  CT of the chest, abdomen and pelvis from 06/01 showed stable multifocal osseous metastases, stable pulmonary nodules measuring up to 5 mm, and stable appearance of left axillary 6 mm lymph node.  Hepatic steatosis was also noted.        Imaging   CT CAP  March 9, 2022  IMPRESSION: Continued appearance of left mastectomy, bilateral  pulmonary nodules,  osteoblastic metastases an 5 mm short axis left  axillary lymph node. Cholecystectomy. All findings unchanged.     LELA CHENG MD     Last 3 echocardiograms  6-22-22   Global and regional left ventricular function is normal with an EF of 55-60%. Left ventricular size is normal.    3-9-22  Left ventricular size, wall motion and function are normal. The ejection  fraction is 60-65%.  11-30-21   Global and regional left ventricular function is normal with an EF of 55-60%.  Left ventricular diastolic function is normal.         ASSESSMENT AND PLAN:     1.  Hoda Brush is a 67-year-old woman with a history of ER-positive, SC-positive, HER2 positive breast cancer.  She is from [facility], formally from [facility], and came to live in the [place] with [place].  The tumor is ER positive, SC positive and HER2 positive.  She had metastatic disease at the time of presentation with bone-only metastases by report.  She underwent neoadjuvant CAF, had a left mastectomy, left axillary lymph node dissection, radiation to the right hip, which included the right iliac region where she had metastatic disease.  She was initially on tamoxifen but then was switched to letrozole.  She continues on this and every 3-week trastuzumab.  She has had no evidence of disease progression for the last 3 years.  Markers are low and stable.  We have continued Herceptin every 3 weeks as well as daily letrozole. CT CAP shows stable pulmonary nodules.  The left axillary lymph node is decreased compared with the prior CT.  2.  Hoda Brush continues to do well.  The biliary symptoms have now resolved.  She continues to do well and will continue with her current plan of Herceptin, letrozole and Xgeva which will be held for now pending dental work.  3.  Continue the letrozole.  Letrozole has been well-tolerated.  No significant joint stiffness.  4.  Echo. Stable EF. --Echo in  showed normal EF without change 60 to 65%. 2-16-21.  5.   Discussion of bone health and right maxillary osteonecrosis.  She will continue with calcium and vitamin D.  She will have a decision on root canal on July 6 so we will hold the Xgeva for now.  6. Follow up. She will have a decision on root canal on July 6 so we will hold the Xgeva for now. Herceptin 7-14, 8-4, 8-25, 9-15. Visit with Jamee Mckeon 8-4 and with me 9-15. CBC, CMP all visits.      Thank you for allowing us to participate in this patient's care.         I spent 35 minutes with the patient more than 50% of which was in counseling and coordination of care.         Again, thank you for allowing me to participate in the care of your patient.      Sincerely,    Lisandro Aguiar MD

## 2022-06-23 NOTE — PATIENT INSTRUCTIONS
[facility] Triage and after hours / weekends / holidays:  [phone]    Please call the triage or after hours line if you experience a temperature greater than or equal to 100.4, shaking chills, have uncontrolled nausea, vomiting and/or diarrhea, dizziness, shortness of breath, chest pain, bleeding, unexplained bruising, or if you have any other new/concerning symptoms, questions or concerns.      If you are having any concerning symptoms or wish to speak to a provider before your next infusion visit, please call your care coordinator or triage to notify them so we can adequately serve you.     If you need a refill on a narcotic prescription or other medication, please call before your infusion appointment.                June 2022 Sunday Monday Tuesday Wednesday Thursday Friday Saturday                  1    LAB PERIPHERAL   9:30 AM   (15 min.)   [facility]   [facility]    CT CHEST/ABDOMEN/PELVIS W   9:50 AM   (20 min.)   [id number]   [facility] [place] 2    RETURN   1:45 PM   (45 min.)   Anne Lea PA-C   [facility]    ONC INFUSION 1 HR (60 MIN)   3:00 PM   (60 min.)    ONC INFUSION NURSE   [facility] 3     4       5     6     7     8     9     10     11       12     13     14     15     16     17     18       19     20     21     22    ECHO COMPLETE   8:30 AM   (60 min.)   [email or web address]   [facility] [place] 23    LAB PERIPHERAL   8:15 AM   (15 min.)   [facility]RAW   [facility]    RETURN   8:55 AM   (60 min.)   Lisandro Aguiar MD   [facility]    ONC INFUSION 1 HR (60 MIN)  10:00 AM   (60 min.)    ONC INFUSION NURSE   [facility] 24     25       26     27     28     29     30                             July 2022 Sunday Monday Tuesday Wednesday  Thursday Friday Saturday                            1     2       3     4     5     6     7     8     9       10     11     12     13     14     15     16       17     18     19     20     21     22     23       24     25     26     27     28     29     30       31                                                      Recent Results (from the past 24 hour(s))   Comprehensive metabolic panel    Collection Time: 06/23/22  8:55 AM   Result Value Ref Range    Sodium 143 133 - 144 mmol/L    Potassium 4.1 3.4 - 5.3 mmol/L    Chloride 107 94 - 109 mmol/L    Carbon Dioxide (CO2) 28 20 - 32 mmol/L    Anion Gap 8 3 - 14 mmol/L    Urea Nitrogen 17 7 - 30 mg/dL    Creatinine 0.79 0.52 - 1.04 mg/dL    Calcium 9.3 8.5 - 10.1 mg/dL    Glucose 103 (H) 70 - 99 mg/dL    Alkaline Phosphatase 50 40 - 150 U/L    AST 30 0 - 45 U/L    ALT 41 0 - 50 U/L    Protein Total 7.9 6.8 - 8.8 g/dL    Albumin 3.5 3.4 - 5.0 g/dL    Bilirubin Total 0.3 0.2 - 1.3 mg/dL    GFR Estimate 82 >60 mL/min/1.73m2   CEA    Collection Time: 06/23/22  8:55 AM   Result Value Ref Range    CEA 1.1 ng/mL   CBC with platelets and differential    Collection Time: 06/23/22  8:55 AM   Result Value Ref Range    WBC Count 7.4 4.0 - 11.0 10e3/uL    RBC Count 4.32 3.80 - 5.20 10e6/uL    Hemoglobin 12.6 11.7 - 15.7 g/dL    Hematocrit 40.4 35.0 - 47.0 %    MCV 94 78 - 100 fL    MCH 29.2 26.5 - 33.0 pg    MCHC 31.2 (L) 31.5 - 36.5 g/dL    RDW 13.7 10.0 - 15.0 %    Platelet Count 198 150 - 450 10e3/uL    % Neutrophils 52 %    % Lymphocytes 36 %    % Monocytes 6 %    % Eosinophils 5 %    % Basophils 1 %    % Immature Granulocytes 0 %    NRBCs per 100 WBC 0 <1 /100    Absolute Neutrophils 3.9 1.6 - 8.3 10e3/uL    Absolute Lymphocytes 2.6 0.8 - 5.3 10e3/uL    Absolute Monocytes 0.5 0.0 - 1.3 10e3/uL    Absolute Eosinophils 0.4 0.0 - 0.7 10e3/uL    Absolute Basophils 0.0 0.0 - 0.2 10e3/uL    Absolute Immature Granulocytes 0.0 <=0.4 10e3/uL    Absolute NRBCs 0.0 10e3/uL

## 2022-06-23 NOTE — NURSING NOTE
"Chief Complaint   Patient presents with     Port Draw     Labs drawn via port by RN. VS taken.     Oncology Clinic Visit     Metastatic breast cancer (H)     Port accessed with 20g 3/4\" power needle and labs drawn by rn.  Port flushed with NS and heparin.  Pt tolerated well.  VS taken.  Pt checked in for next appt.    Jonathan Francisco RN  "

## 2022-06-24 LAB
Lab: NORMAL
PERFORMING LABORATORY: NORMAL
SPECIMEN STATUS: NORMAL
TEST NAME: NORMAL

## 2022-06-28 LAB — MISCELLANEOUS TEST 1 (ARUP): NORMAL

## 2022-07-13 RX ORDER — ACETAMINOPHEN 325 MG/1
650 TABLET ORAL
Status: CANCELLED | OUTPATIENT
Start: 2022-07-14

## 2022-07-13 RX ORDER — EPINEPHRINE 1 MG/ML
0.3 INJECTION, SOLUTION INTRAMUSCULAR; SUBCUTANEOUS EVERY 5 MIN PRN
Status: CANCELLED | OUTPATIENT
Start: 2022-07-14

## 2022-07-13 RX ORDER — EPINEPHRINE 0.3 MG/.3ML
0.3 INJECTION SUBCUTANEOUS EVERY 5 MIN PRN
Status: CANCELLED | OUTPATIENT
Start: 2022-07-14

## 2022-07-13 RX ORDER — DIPHENHYDRAMINE HCL 25 MG
50 CAPSULE ORAL ONCE
Status: CANCELLED
Start: 2022-07-14

## 2022-07-13 RX ORDER — ALBUTEROL SULFATE 90 UG/1
1-2 AEROSOL, METERED RESPIRATORY (INHALATION)
Status: CANCELLED
Start: 2022-07-14

## 2022-07-13 RX ORDER — LORAZEPAM 2 MG/ML
0.5 INJECTION INTRAMUSCULAR EVERY 4 HOURS PRN
Status: CANCELLED
Start: 2022-07-14

## 2022-07-13 RX ORDER — SODIUM CHLORIDE 9 MG/ML
1000 INJECTION, SOLUTION INTRAVENOUS CONTINUOUS PRN
Status: CANCELLED
Start: 2022-07-14

## 2022-07-13 RX ORDER — METHYLPREDNISOLONE SODIUM SUCCINATE 125 MG/2ML
125 INJECTION, POWDER, LYOPHILIZED, FOR SOLUTION INTRAMUSCULAR; INTRAVENOUS
Status: CANCELLED
Start: 2022-07-14

## 2022-07-13 RX ORDER — HEPARIN SODIUM (PORCINE) LOCK FLUSH IV SOLN 100 UNIT/ML 100 UNIT/ML
5 SOLUTION INTRAVENOUS EVERY 8 HOURS
Status: CANCELLED | OUTPATIENT
Start: 2022-07-14

## 2022-07-13 RX ORDER — ALBUTEROL SULFATE 0.83 MG/ML
2.5 SOLUTION RESPIRATORY (INHALATION)
Status: CANCELLED | OUTPATIENT
Start: 2022-07-14

## 2022-07-13 RX ORDER — DIPHENHYDRAMINE HYDROCHLORIDE 50 MG/ML
50 INJECTION INTRAMUSCULAR; INTRAVENOUS
Status: CANCELLED
Start: 2022-07-14

## 2022-07-14 ENCOUNTER — INFUSION THERAPY VISIT (OUTPATIENT)
Dept: ONCOLOGY | Facility: CLINIC | Age: 67
End: 2022-07-14
Attending: INTERNAL MEDICINE
Payer: COMMERCIAL

## 2022-07-14 ENCOUNTER — LAB (OUTPATIENT)
Dept: LAB | Facility: CLINIC | Age: 67
End: 2022-07-14
Attending: INTERNAL MEDICINE
Payer: COMMERCIAL

## 2022-07-14 VITALS
DIASTOLIC BLOOD PRESSURE: 72 MMHG | BODY MASS INDEX: 32.32 KG/M2 | TEMPERATURE: 97.7 F | RESPIRATION RATE: 18 BRPM | WEIGHT: 181 LBS | OXYGEN SATURATION: 96 % | SYSTOLIC BLOOD PRESSURE: 123 MMHG | HEART RATE: 85 BPM

## 2022-07-14 DIAGNOSIS — C50.912 MALIGNANT NEOPLASM OF LEFT FEMALE BREAST, UNSPECIFIED ESTROGEN RECEPTOR STATUS, UNSPECIFIED SITE OF BREAST (H): Primary | ICD-10-CM

## 2022-07-14 LAB
ALBUMIN SERPL-MCNC: 3.2 G/DL (ref 3.4–5)
ALP SERPL-CCNC: 50 U/L (ref 40–150)
ALT SERPL W P-5'-P-CCNC: 39 U/L (ref 0–50)
ANION GAP SERPL CALCULATED.3IONS-SCNC: 9 MMOL/L (ref 3–14)
AST SERPL W P-5'-P-CCNC: 34 U/L (ref 0–45)
BASOPHILS # BLD AUTO: 0 10E3/UL (ref 0–0.2)
BASOPHILS NFR BLD AUTO: 1 %
BILIRUB SERPL-MCNC: 0.2 MG/DL (ref 0.2–1.3)
BUN SERPL-MCNC: 14 MG/DL (ref 7–30)
CALCIUM SERPL-MCNC: 8.8 MG/DL (ref 8.5–10.1)
CEA SERPL-MCNC: 1.3 NG/ML
CHLORIDE BLD-SCNC: 108 MMOL/L (ref 94–109)
CO2 SERPL-SCNC: 28 MMOL/L (ref 20–32)
CREAT SERPL-MCNC: 0.78 MG/DL (ref 0.52–1.04)
EOSINOPHIL # BLD AUTO: 0.2 10E3/UL (ref 0–0.7)
EOSINOPHIL NFR BLD AUTO: 3 %
ERYTHROCYTE [DISTWIDTH] IN BLOOD BY AUTOMATED COUNT: 13.8 % (ref 10–15)
GFR SERPL CREATININE-BSD FRML MDRD: 83 ML/MIN/1.73M2
GLUCOSE BLD-MCNC: 107 MG/DL (ref 70–99)
HCT VFR BLD AUTO: 40 % (ref 35–47)
HGB BLD-MCNC: 12.4 G/DL (ref 11.7–15.7)
IMM GRANULOCYTES # BLD: 0 10E3/UL
IMM GRANULOCYTES NFR BLD: 0 %
LYMPHOCYTES # BLD AUTO: 2.7 10E3/UL (ref 0.8–5.3)
LYMPHOCYTES NFR BLD AUTO: 37 %
MCH RBC QN AUTO: 29.3 PG (ref 26.5–33)
MCHC RBC AUTO-ENTMCNC: 31 G/DL (ref 31.5–36.5)
MCV RBC AUTO: 95 FL (ref 78–100)
MONOCYTES # BLD AUTO: 0.5 10E3/UL (ref 0–1.3)
MONOCYTES NFR BLD AUTO: 6 %
NEUTROPHILS # BLD AUTO: 3.9 10E3/UL (ref 1.6–8.3)
NEUTROPHILS NFR BLD AUTO: 53 %
NRBC # BLD AUTO: 0 10E3/UL
NRBC BLD AUTO-RTO: 0 /100
PLATELET # BLD AUTO: 169 10E3/UL (ref 150–450)
POTASSIUM BLD-SCNC: 4 MMOL/L (ref 3.4–5.3)
PROT SERPL-MCNC: 7.3 G/DL (ref 6.8–8.8)
RBC # BLD AUTO: 4.23 10E6/UL (ref 3.8–5.2)
SODIUM SERPL-SCNC: 145 MMOL/L (ref 133–144)
WBC # BLD AUTO: 7.4 10E3/UL (ref 4–11)

## 2022-07-14 PROCEDURE — 258N000003 HC RX IP 258 OP 636: Performed by: PHYSICIAN ASSISTANT

## 2022-07-14 PROCEDURE — 250N000011 HC RX IP 250 OP 636: Performed by: INTERNAL MEDICINE

## 2022-07-14 PROCEDURE — 85025 COMPLETE CBC W/AUTO DIFF WBC: CPT | Performed by: INTERNAL MEDICINE

## 2022-07-14 PROCEDURE — 250N000011 HC RX IP 250 OP 636: Performed by: PHYSICIAN ASSISTANT

## 2022-07-14 PROCEDURE — 80053 COMPREHEN METABOLIC PANEL: CPT | Performed by: INTERNAL MEDICINE

## 2022-07-14 PROCEDURE — 36415 COLL VENOUS BLD VENIPUNCTURE: CPT | Performed by: INTERNAL MEDICINE

## 2022-07-14 PROCEDURE — 82378 CARCINOEMBRYONIC ANTIGEN: CPT | Performed by: INTERNAL MEDICINE

## 2022-07-14 PROCEDURE — 96413 CHEMO IV INFUSION 1 HR: CPT

## 2022-07-14 RX ORDER — HEPARIN SODIUM (PORCINE) LOCK FLUSH IV SOLN 100 UNIT/ML 100 UNIT/ML
5 SOLUTION INTRAVENOUS EVERY 8 HOURS
Status: DISCONTINUED | OUTPATIENT
Start: 2022-07-14 | End: 2022-07-14 | Stop reason: HOSPADM

## 2022-07-14 RX ADMIN — TRASTUZUMAB 450 MG: 150 INJECTION, POWDER, LYOPHILIZED, FOR SOLUTION INTRAVENOUS at 14:09

## 2022-07-14 RX ADMIN — Medication 5 ML: at 14:12

## 2022-07-14 RX ADMIN — Medication 5 ML: at 12:24

## 2022-07-14 ASSESSMENT — PAIN SCALES - GENERAL: PAINLEVEL: NO PAIN (0)

## 2022-07-14 NOTE — NURSING NOTE
Chief Complaint   Patient presents with     Port Draw     Power needle. Heparin locked, vitals checked     Pia Schneider RN on 7/14/2022 at 12:27 PM

## 2022-07-14 NOTE — PROGRESS NOTES
Infusion Nursing Note:  Hoda Brush presents today for Cycle 83 Day 1 Herceptin.    Patient seen by provider today: No   present during visit today: Not Applicable.    Note:     Pt reports she did not need to have any dental work done at her appointment last week; she just had a cleaning and is wondering if she can get Xgeva today. Per Dr. Aguiar's last note states xgeva is on hold until her dental work completed. Writer unable to reach Dr. Aguiar today to re-evaluate situation; message sent to Dr. Aguiar and she will see Jamee next month prior her infusion. Explained patient can return in the next 1-2 days to get it if she prefers and Stefania approves. She states she would rather wait until next month.     Intravenous Access:  Implanted Port.    Treatment Conditions:  Lab Results   Component Value Date    HGB 12.4 07/14/2022    WBC 7.4 07/14/2022    ANEU 3.1 06/22/2021    ANEUTAUTO 3.9 07/14/2022     07/14/2022      ECHO- 6/22 WNL    Post Infusion Assessment:  Patient tolerated infusion without incident.  Blood return noted pre and post infusion.  No evidence of extravasations.  Access discontinued per protocol.     Discharge Plan:   Patient declined prescription refills.  Discharge instructions reviewed with: Patient.  AVS to patient via [employer].  Patient will return 8/5 for next appointment.   Departure Mode: Ambulatory.      Jacque Martino RN

## 2022-07-18 RX ORDER — ACETAMINOPHEN 325 MG/1
650 TABLET ORAL
Status: CANCELLED | OUTPATIENT
Start: 2022-08-04

## 2022-07-18 RX ORDER — ALBUTEROL SULFATE 90 UG/1
1-2 AEROSOL, METERED RESPIRATORY (INHALATION)
Status: CANCELLED
Start: 2022-08-04

## 2022-07-18 RX ORDER — DIPHENHYDRAMINE HYDROCHLORIDE 50 MG/ML
50 INJECTION INTRAMUSCULAR; INTRAVENOUS
Status: CANCELLED
Start: 2022-08-04

## 2022-07-18 RX ORDER — ALBUTEROL SULFATE 0.83 MG/ML
2.5 SOLUTION RESPIRATORY (INHALATION)
Status: CANCELLED | OUTPATIENT
Start: 2022-08-04

## 2022-07-18 RX ORDER — SODIUM CHLORIDE 9 MG/ML
1000 INJECTION, SOLUTION INTRAVENOUS CONTINUOUS PRN
Status: CANCELLED
Start: 2022-08-04

## 2022-07-18 RX ORDER — METHYLPREDNISOLONE SODIUM SUCCINATE 125 MG/2ML
125 INJECTION, POWDER, LYOPHILIZED, FOR SOLUTION INTRAMUSCULAR; INTRAVENOUS
Status: CANCELLED
Start: 2022-08-04

## 2022-07-18 RX ORDER — EPINEPHRINE 1 MG/ML
0.3 INJECTION, SOLUTION INTRAMUSCULAR; SUBCUTANEOUS EVERY 5 MIN PRN
Status: CANCELLED | OUTPATIENT
Start: 2022-08-04

## 2022-07-18 RX ORDER — LORAZEPAM 2 MG/ML
0.5 INJECTION INTRAMUSCULAR EVERY 4 HOURS PRN
Status: CANCELLED
Start: 2022-08-04

## 2022-07-18 RX ORDER — EPINEPHRINE 0.3 MG/.3ML
0.3 INJECTION SUBCUTANEOUS EVERY 5 MIN PRN
Status: CANCELLED | OUTPATIENT
Start: 2022-08-04

## 2022-07-18 RX ORDER — HEPARIN SODIUM (PORCINE) LOCK FLUSH IV SOLN 100 UNIT/ML 100 UNIT/ML
5 SOLUTION INTRAVENOUS EVERY 8 HOURS
Status: CANCELLED | OUTPATIENT
Start: 2022-08-04

## 2022-07-18 RX ORDER — DIPHENHYDRAMINE HCL 25 MG
50 CAPSULE ORAL ONCE
Status: CANCELLED
Start: 2022-08-04

## 2022-07-28 DIAGNOSIS — K21.9 GASTROESOPHAGEAL REFLUX DISEASE WITHOUT ESOPHAGITIS: ICD-10-CM

## 2022-07-28 RX ORDER — FAMOTIDINE 20 MG/1
20 TABLET, FILM COATED ORAL 2 TIMES DAILY
Qty: 180 TABLET | Refills: 3 | Status: CANCELLED | OUTPATIENT
Start: 2022-07-28

## 2022-07-28 NOTE — TELEPHONE ENCOUNTER
Medication:Famotidine 20mg  Last prescribing provider:Dr. Aguiar June 2021    Last clinic visit date: 6/23/22 Dr. Aguiar  Recommendations for requested medication:   None specific    Any missed appointments or no-shows since last clinic visit?: none  Next clinic visit date: 8/5/22 Jamee Mckeon PA-C    Any other pertinent information:  Routed to dr. Aguiar

## 2022-07-29 DIAGNOSIS — K21.9 GASTROESOPHAGEAL REFLUX DISEASE WITHOUT ESOPHAGITIS: ICD-10-CM

## 2022-07-29 RX ORDER — FAMOTIDINE 20 MG/1
20 TABLET, FILM COATED ORAL 2 TIMES DAILY
Qty: 180 TABLET | Refills: 3 | Status: SHIPPED | OUTPATIENT
Start: 2022-07-29 | End: 2023-07-25

## 2022-08-05 ENCOUNTER — ONCOLOGY VISIT (OUTPATIENT)
Dept: ONCOLOGY | Facility: CLINIC | Age: 67
End: 2022-08-05
Attending: PHYSICIAN ASSISTANT
Payer: COMMERCIAL

## 2022-08-05 ENCOUNTER — APPOINTMENT (OUTPATIENT)
Dept: LAB | Facility: CLINIC | Age: 67
End: 2022-08-05
Attending: PHYSICIAN ASSISTANT
Payer: COMMERCIAL

## 2022-08-05 VITALS
RESPIRATION RATE: 16 BRPM | BODY MASS INDEX: 31.62 KG/M2 | TEMPERATURE: 97.1 F | WEIGHT: 177.1 LBS | SYSTOLIC BLOOD PRESSURE: 110 MMHG | OXYGEN SATURATION: 95 % | HEART RATE: 73 BPM | DIASTOLIC BLOOD PRESSURE: 72 MMHG

## 2022-08-05 DIAGNOSIS — C79.51 BONE METASTASIS: ICD-10-CM

## 2022-08-05 DIAGNOSIS — C50.919 METASTATIC BREAST CANCER: Primary | ICD-10-CM

## 2022-08-05 DIAGNOSIS — C50.912 MALIGNANT NEOPLASM OF LEFT FEMALE BREAST, UNSPECIFIED ESTROGEN RECEPTOR STATUS, UNSPECIFIED SITE OF BREAST (H): Primary | ICD-10-CM

## 2022-08-05 LAB
ALBUMIN SERPL-MCNC: 3.4 G/DL (ref 3.4–5)
ALP SERPL-CCNC: 50 U/L (ref 40–150)
ALT SERPL W P-5'-P-CCNC: 38 U/L (ref 0–50)
ANION GAP SERPL CALCULATED.3IONS-SCNC: 9 MMOL/L (ref 3–14)
AST SERPL W P-5'-P-CCNC: 25 U/L (ref 0–45)
BASOPHILS # BLD AUTO: 0 10E3/UL (ref 0–0.2)
BASOPHILS NFR BLD AUTO: 0 %
BILIRUB SERPL-MCNC: 0.3 MG/DL (ref 0.2–1.3)
BUN SERPL-MCNC: 17 MG/DL (ref 7–30)
CALCIUM SERPL-MCNC: 8.8 MG/DL (ref 8.5–10.1)
CANCER AG15-3 SERPL-ACNC: 6 U/ML
CEA SERPL-MCNC: 1.2 NG/ML
CHLORIDE BLD-SCNC: 106 MMOL/L (ref 94–109)
CO2 SERPL-SCNC: 29 MMOL/L (ref 20–32)
CREAT SERPL-MCNC: 0.81 MG/DL (ref 0.52–1.04)
EOSINOPHIL # BLD AUTO: 0.2 10E3/UL (ref 0–0.7)
EOSINOPHIL NFR BLD AUTO: 2 %
ERYTHROCYTE [DISTWIDTH] IN BLOOD BY AUTOMATED COUNT: 13.7 % (ref 10–15)
GFR SERPL CREATININE-BSD FRML MDRD: 80 ML/MIN/1.73M2
GLUCOSE BLD-MCNC: 99 MG/DL (ref 70–99)
HCT VFR BLD AUTO: 39.8 % (ref 35–47)
HGB BLD-MCNC: 12.5 G/DL (ref 11.7–15.7)
IMM GRANULOCYTES # BLD: 0 10E3/UL
IMM GRANULOCYTES NFR BLD: 0 %
LYMPHOCYTES # BLD AUTO: 3.3 10E3/UL (ref 0.8–5.3)
LYMPHOCYTES NFR BLD AUTO: 41 %
MCH RBC QN AUTO: 29.3 PG (ref 26.5–33)
MCHC RBC AUTO-ENTMCNC: 31.4 G/DL (ref 31.5–36.5)
MCV RBC AUTO: 93 FL (ref 78–100)
MONOCYTES # BLD AUTO: 0.6 10E3/UL (ref 0–1.3)
MONOCYTES NFR BLD AUTO: 7 %
NEUTROPHILS # BLD AUTO: 4 10E3/UL (ref 1.6–8.3)
NEUTROPHILS NFR BLD AUTO: 50 %
NRBC # BLD AUTO: 0 10E3/UL
NRBC BLD AUTO-RTO: 0 /100
PLATELET # BLD AUTO: 223 10E3/UL (ref 150–450)
POTASSIUM BLD-SCNC: 3.9 MMOL/L (ref 3.4–5.3)
PROT SERPL-MCNC: 7.6 G/DL (ref 6.8–8.8)
RBC # BLD AUTO: 4.26 10E6/UL (ref 3.8–5.2)
SODIUM SERPL-SCNC: 144 MMOL/L (ref 133–144)
WBC # BLD AUTO: 8.2 10E3/UL (ref 4–11)

## 2022-08-05 PROCEDURE — 80053 COMPREHEN METABOLIC PANEL: CPT | Performed by: INTERNAL MEDICINE

## 2022-08-05 PROCEDURE — 82378 CARCINOEMBRYONIC ANTIGEN: CPT | Performed by: INTERNAL MEDICINE

## 2022-08-05 PROCEDURE — 96372 THER/PROPH/DIAG INJ SC/IM: CPT | Performed by: INTERNAL MEDICINE

## 2022-08-05 PROCEDURE — 85025 COMPLETE CBC W/AUTO DIFF WBC: CPT | Performed by: INTERNAL MEDICINE

## 2022-08-05 PROCEDURE — 258N000003 HC RX IP 258 OP 636: Performed by: INTERNAL MEDICINE

## 2022-08-05 PROCEDURE — G0463 HOSPITAL OUTPT CLINIC VISIT: HCPCS

## 2022-08-05 PROCEDURE — 250N000011 HC RX IP 250 OP 636: Performed by: PHYSICIAN ASSISTANT

## 2022-08-05 PROCEDURE — 250N000011 HC RX IP 250 OP 636: Performed by: INTERNAL MEDICINE

## 2022-08-05 PROCEDURE — 36415 COLL VENOUS BLD VENIPUNCTURE: CPT | Performed by: INTERNAL MEDICINE

## 2022-08-05 PROCEDURE — 99214 OFFICE O/P EST MOD 30 MIN: CPT | Performed by: PHYSICIAN ASSISTANT

## 2022-08-05 PROCEDURE — 96372 THER/PROPH/DIAG INJ SC/IM: CPT | Mod: 59 | Performed by: INTERNAL MEDICINE

## 2022-08-05 PROCEDURE — 96413 CHEMO IV INFUSION 1 HR: CPT

## 2022-08-05 PROCEDURE — 86300 IMMUNOASSAY TUMOR CA 15-3: CPT | Performed by: INTERNAL MEDICINE

## 2022-08-05 RX ORDER — HEPARIN SODIUM (PORCINE) LOCK FLUSH IV SOLN 100 UNIT/ML 100 UNIT/ML
5 SOLUTION INTRAVENOUS ONCE
Status: COMPLETED | OUTPATIENT
Start: 2022-08-05 | End: 2022-08-05

## 2022-08-05 RX ORDER — PNEUMOCOCCAL 20-VALENT CONJUGATE VACCINE 2.2; 2.2; 2.2; 2.2; 2.2; 2.2; 2.2; 2.2; 2.2; 2.2; 2.2; 2.2; 2.2; 2.2; 2.2; 2.2; 4.4; 2.2; 2.2; 2.2 UG/.5ML; UG/.5ML; UG/.5ML; UG/.5ML; UG/.5ML; UG/.5ML; UG/.5ML; UG/.5ML; UG/.5ML; UG/.5ML; UG/.5ML; UG/.5ML; UG/.5ML; UG/.5ML; UG/.5ML; UG/.5ML; UG/.5ML; UG/.5ML; UG/.5ML; UG/.5ML
INJECTION, SUSPENSION INTRAMUSCULAR
COMMUNITY
Start: 2022-03-02 | End: 2022-09-12

## 2022-08-05 RX ORDER — HEPARIN SODIUM (PORCINE) LOCK FLUSH IV SOLN 100 UNIT/ML 100 UNIT/ML
5 SOLUTION INTRAVENOUS EVERY 8 HOURS
Status: DISCONTINUED | OUTPATIENT
Start: 2022-08-05 | End: 2022-08-05 | Stop reason: HOSPADM

## 2022-08-05 RX ORDER — ZOSTER VACCINE RECOMBINANT, ADJUVANTED 50 MCG/0.5
KIT INTRAMUSCULAR
COMMUNITY
Start: 2022-03-26 | End: 2022-09-12

## 2022-08-05 RX ADMIN — Medication 5 ML: at 06:32

## 2022-08-05 RX ADMIN — TRASTUZUMAB 450 MG: 150 INJECTION, POWDER, LYOPHILIZED, FOR SOLUTION INTRAVENOUS at 08:30

## 2022-08-05 RX ADMIN — Medication 5 ML: at 09:04

## 2022-08-05 RX ADMIN — DENOSUMAB 120 MG: 120 INJECTION SUBCUTANEOUS at 08:33

## 2022-08-05 ASSESSMENT — PAIN SCALES - GENERAL: PAINLEVEL: NO PAIN (0)

## 2022-08-05 NOTE — PATIENT INSTRUCTIONS
[facility] Triage and after hours / weekends / holidays:  [phone]    Please call the triage or after hours line if you experience a temperature greater than or equal to 100.5, shaking chills, have uncontrolled nausea, vomiting and/or diarrhea, dizziness, shortness of breath, chest pain, bleeding, unexplained bruising, or if you have any other new/concerning symptoms, questions or concerns.      If you are having any concerning symptoms or wish to speak to a provider before your next infusion visit, please call your care coordinator or triage to notify them so we can adequately serve you.     If you need a refill on a narcotic prescription or other medication, please call before your infusion appointment.        August 2022 Sunday Monday Tuesday Wednesday Thursday Friday Saturday        1     2     3     4     5    LAB PERIPHERAL   6:15 AM   (15 min.)   [facility] LAB DRAW   [facility]    RETURN   6:45 AM   (45 min.)   Jamee Mckeon PA-C   [facility]    ONC INFUSION 1 HR (60 MIN)   8:30 AM   (60 min.)    ONC INFUSION NURSE   [facility] 6       7     8     9     10     11     12     13       14     15     16     17     18     19     20       21     22     23     24     25     26    LAB PERIPHERAL   9:00 AM   (15 min.)   [facility]   [facility]    ONC INFUSION 1 HR (60 MIN)   9:30 AM   (60 min.)    ONC INFUSION NURSE   [facility] 27       28     29     30     31                                  September 2022 Sunday Monday Tuesday Wednesday Thursday Friday Saturday                       1     2     3       4     5     6     7     8     9     10       11     12    OFFICE VISIT   9:10 AM   (30 min.)   Laury Dee MD   [facility] 13     14     15    LAB PERIPHERAL   7:45 AM   (15 min.)   [facility] DRAW     [facility]    RETURN   8:25 AM   (30 min.)   Lisandro Aguiar MD   [facility]    ONC INFUSION 1 HR (60 MIN)   9:30 AM   (60 min.)    ONC INFUSION NURSE   [facility] 16     17       18     19     20     21     22     23     24       25     26     27     28     29     30                             Recent Results (from the past 24 hour(s))   Comprehensive metabolic panel    Collection Time: 08/05/22  6:38 AM   Result Value Ref Range    Sodium 144 133 - 144 mmol/L    Potassium 3.9 3.4 - 5.3 mmol/L    Chloride 106 94 - 109 mmol/L    Carbon Dioxide (CO2) 29 20 - 32 mmol/L    Anion Gap 9 3 - 14 mmol/L    Urea Nitrogen 17 7 - 30 mg/dL    Creatinine 0.81 0.52 - 1.04 mg/dL    Calcium 8.8 8.5 - 10.1 mg/dL    Glucose 99 70 - 99 mg/dL    Alkaline Phosphatase 50 40 - 150 U/L    AST 25 0 - 45 U/L    ALT 38 0 - 50 U/L    Protein Total 7.6 6.8 - 8.8 g/dL    Albumin 3.4 3.4 - 5.0 g/dL    Bilirubin Total 0.3 0.2 - 1.3 mg/dL    GFR Estimate 80 >60 mL/min/1.73m2   CBC with platelets and differential    Collection Time: 08/05/22  6:38 AM   Result Value Ref Range    WBC Count 8.2 4.0 - 11.0 10e3/uL    RBC Count 4.26 3.80 - 5.20 10e6/uL    Hemoglobin 12.5 11.7 - 15.7 g/dL    Hematocrit 39.8 35.0 - 47.0 %    MCV 93 78 - 100 fL    MCH 29.3 26.5 - 33.0 pg    MCHC 31.4 (L) 31.5 - 36.5 g/dL    RDW 13.7 10.0 - 15.0 %    Platelet Count 223 150 - 450 10e3/uL    % Neutrophils 50 %    % Lymphocytes 41 %    % Monocytes 7 %    % Eosinophils 2 %    % Basophils 0 %    % Immature Granulocytes 0 %    NRBCs per 100 WBC 0 <1 /100    Absolute Neutrophils 4.0 1.6 - 8.3 10e3/uL    Absolute Lymphocytes 3.3 0.8 - 5.3 10e3/uL    Absolute Monocytes 0.6 0.0 - 1.3 10e3/uL    Absolute Eosinophils 0.2 0.0 - 0.7 10e3/uL    Absolute Basophils 0.0 0.0 - 0.2 10e3/uL    Absolute Immature Granulocytes 0.0 <=0.4 10e3/uL    Absolute NRBCs 0.0 10e3/uL

## 2022-08-05 NOTE — PROGRESS NOTES
Infusion Nursing Note:  Hoda Brush presents today for cycle 84 day 1 herceptin, xgeva.    Patient seen by provider today: Yes: Jamee Mckeon PA-C   present during visit today: Not Applicable.    Note:   Patient denies any questions or concerns after her appointment with Jamee Mckeon. Patient reports she does not need a root canal and dental has no work to do. Jamee Mckeon updated.    TORB: Jamee Mckeon PA-C/Sherry Louis RN at 0744 on 8/5/22: ok to give xgeva today.    Intravenous Access:  Implanted Port.    Treatment Conditions:  Lab Results   Component Value Date    HGB 12.5 08/05/2022    WBC 8.2 08/05/2022    ANEU 3.1 06/22/2021    ANEUTAUTO 4.0 08/05/2022     08/05/2022      Lab Results   Component Value Date     08/05/2022    POTASSIUM 3.9 08/05/2022    CR 0.81 08/05/2022    TAYLER 8.8 08/05/2022    BILITOTAL 0.3 08/05/2022    ALBUMIN 3.4 08/05/2022    ALT 38 08/05/2022    AST 25 08/05/2022     Results reviewed, labs MET treatment parameters, ok to proceed with treatment.  ECHO/MUGA completed 6/22/22  EF 55-60%.  Corrected calcium 9.28    Post Infusion Assessment:  Patient tolerated infusion without incident.  Patient tolerated xgeva injection into right arm without incident.  Blood return noted pre and post infusion.  Site patent and intact, free from redness, edema or discomfort.  No evidence of extravasations.  Access discontinued per protocol.     Discharge Plan:   Patient declined prescription refills.  Discharge instructions reviewed with: Patient.  Patient and/or family verbalized understanding of discharge instructions and all questions answered.  AVS to patient via [employer].  Patient will return 8/26 for next appointment.   Patient discharged in stable condition accompanied by: self.  Departure Mode: Ambulatory.      Sherry Louis, RN

## 2022-08-05 NOTE — NURSING NOTE
"Oncology Rooming Note    August 5, 2022 6:54 AM   Hoda Brush is a 67 year old female who presents for:    Chief Complaint   Patient presents with     Port Draw     Labs drawn via port by rn in lab. VS taken.     Oncology Clinic Visit     Malignant Neoplasm of Left Breast     Initial Vitals: /72 (BP Location: Right arm, Patient Position: Sitting, Cuff Size: Adult Regular)   Pulse 73   Temp 97.1  F (36.2  C) (Oral)   Resp 16   Wt 80.3 kg (177 lb 1.6 oz)   SpO2 95%   BMI 31.62 kg/m   Estimated body mass index is 31.62 kg/m  as calculated from the following:    Height as of 3/7/22: 1.594 m (5' 2.75\").    Weight as of this encounter: 80.3 kg (177 lb 1.6 oz). Body surface area is 1.89 meters squared.  No Pain (0) Comment: Data Unavailable   No LMP recorded. Patient is postmenopausal.  Allergies reviewed: Yes  Medications reviewed: Yes    Medications: Medication refills not needed today.  Pharmacy name entered into [employer]: [id number] [place], [place] - [address]    Clinical concerns: none.       Naun Sena"

## 2022-08-24 ENCOUNTER — PATIENT OUTREACH (OUTPATIENT)
Dept: GERIATRIC MEDICINE | Facility: CLINIC | Age: 67
End: 2022-08-24

## 2022-08-24 NOTE — PROGRESS NOTES
[facility] Care Coordination Contact    Internal CC change effective 9/1/22.  Mailed member CC Change letter.  Additional tasks to be completed by CMS include: update database & EPIC, enter CC Change in MMIS, and move member files on Q drive.    Chey Hanley  Case Management Specialist  [facility]  [phone]

## 2022-08-24 NOTE — LETTER
August 24, 2022    NATASHA LOUIS  [address]   [place] [address]      Dear Natasha:    As a member of [facility] you are provided a care coordinator. I will be your new care coordinator as of 9/1/22. I will be calling you soon to see how you are doing and determine your needs.    If you have any questions, please feel free to call me at [phone]. If you reach my voice mail, please leave a message and your phone number. If you are hearing impaired, please call the [place] Relay at 616 or [phone] ([id number] relay service).    I look forward to speaking with you soon.    Sincerely,        Lakesha Ventura RN, BSN, PHN  [phone]  [email or web address]                  [facility]  [id number] DHS Approved ([id number])  [id number] (11/18)

## 2022-08-26 ENCOUNTER — APPOINTMENT (OUTPATIENT)
Dept: LAB | Facility: CLINIC | Age: 67
End: 2022-08-26
Attending: INTERNAL MEDICINE
Payer: COMMERCIAL

## 2022-08-26 ENCOUNTER — INFUSION THERAPY VISIT (OUTPATIENT)
Dept: ONCOLOGY | Facility: CLINIC | Age: 67
End: 2022-08-26
Attending: INTERNAL MEDICINE
Payer: COMMERCIAL

## 2022-08-26 VITALS
WEIGHT: 177.9 LBS | TEMPERATURE: 97.9 F | OXYGEN SATURATION: 98 % | DIASTOLIC BLOOD PRESSURE: 75 MMHG | SYSTOLIC BLOOD PRESSURE: 117 MMHG | BODY MASS INDEX: 31.77 KG/M2 | RESPIRATION RATE: 16 BRPM | HEART RATE: 78 BPM

## 2022-08-26 DIAGNOSIS — C50.912 MALIGNANT NEOPLASM OF LEFT FEMALE BREAST, UNSPECIFIED ESTROGEN RECEPTOR STATUS, UNSPECIFIED SITE OF BREAST (H): Primary | ICD-10-CM

## 2022-08-26 LAB
ALBUMIN SERPL-MCNC: 3.3 G/DL (ref 3.4–5)
ALP SERPL-CCNC: 53 U/L (ref 40–150)
ALT SERPL W P-5'-P-CCNC: 43 U/L (ref 0–50)
ANION GAP SERPL CALCULATED.3IONS-SCNC: 6 MMOL/L (ref 3–14)
AST SERPL W P-5'-P-CCNC: 31 U/L (ref 0–45)
BASOPHILS # BLD AUTO: 0 10E3/UL (ref 0–0.2)
BASOPHILS NFR BLD AUTO: 0 %
BILIRUB SERPL-MCNC: 0.4 MG/DL (ref 0.2–1.3)
BUN SERPL-MCNC: 14 MG/DL (ref 7–30)
CALCIUM SERPL-MCNC: 8.8 MG/DL (ref 8.5–10.1)
CANCER AG15-3 SERPL-ACNC: 6 U/ML
CEA SERPL-MCNC: 1 NG/ML
CHLORIDE BLD-SCNC: 109 MMOL/L (ref 94–109)
CO2 SERPL-SCNC: 29 MMOL/L (ref 20–32)
CREAT SERPL-MCNC: 0.78 MG/DL (ref 0.52–1.04)
EOSINOPHIL # BLD AUTO: 0.2 10E3/UL (ref 0–0.7)
EOSINOPHIL NFR BLD AUTO: 3 %
ERYTHROCYTE [DISTWIDTH] IN BLOOD BY AUTOMATED COUNT: 13.8 % (ref 10–15)
GFR SERPL CREATININE-BSD FRML MDRD: 83 ML/MIN/1.73M2
GLUCOSE BLD-MCNC: 90 MG/DL (ref 70–99)
HCT VFR BLD AUTO: 38.9 % (ref 35–47)
HGB BLD-MCNC: 12.3 G/DL (ref 11.7–15.7)
HOLD SPECIMEN: NORMAL
HOLD SPECIMEN: NORMAL
IMM GRANULOCYTES # BLD: 0 10E3/UL
IMM GRANULOCYTES NFR BLD: 0 %
LYMPHOCYTES # BLD AUTO: 2.8 10E3/UL (ref 0.8–5.3)
LYMPHOCYTES NFR BLD AUTO: 36 %
MCH RBC QN AUTO: 29.5 PG (ref 26.5–33)
MCHC RBC AUTO-ENTMCNC: 31.6 G/DL (ref 31.5–36.5)
MCV RBC AUTO: 93 FL (ref 78–100)
MONOCYTES # BLD AUTO: 0.5 10E3/UL (ref 0–1.3)
MONOCYTES NFR BLD AUTO: 6 %
NEUTROPHILS # BLD AUTO: 4.4 10E3/UL (ref 1.6–8.3)
NEUTROPHILS NFR BLD AUTO: 55 %
NRBC # BLD AUTO: 0 10E3/UL
NRBC BLD AUTO-RTO: 0 /100
PLATELET # BLD AUTO: 215 10E3/UL (ref 150–450)
POTASSIUM BLD-SCNC: 4.2 MMOL/L (ref 3.4–5.3)
PROT SERPL-MCNC: 7.7 G/DL (ref 6.8–8.8)
RBC # BLD AUTO: 4.17 10E6/UL (ref 3.8–5.2)
SODIUM SERPL-SCNC: 144 MMOL/L (ref 133–144)
WBC # BLD AUTO: 8 10E3/UL (ref 4–11)

## 2022-08-26 PROCEDURE — 36415 COLL VENOUS BLD VENIPUNCTURE: CPT

## 2022-08-26 PROCEDURE — 80053 COMPREHEN METABOLIC PANEL: CPT | Performed by: INTERNAL MEDICINE

## 2022-08-26 PROCEDURE — 82378 CARCINOEMBRYONIC ANTIGEN: CPT

## 2022-08-26 PROCEDURE — 250N000011 HC RX IP 250 OP 636: Performed by: PHYSICIAN ASSISTANT

## 2022-08-26 PROCEDURE — 96413 CHEMO IV INFUSION 1 HR: CPT

## 2022-08-26 PROCEDURE — 86300 IMMUNOASSAY TUMOR CA 15-3: CPT

## 2022-08-26 PROCEDURE — 85041 AUTOMATED RBC COUNT: CPT | Performed by: INTERNAL MEDICINE

## 2022-08-26 PROCEDURE — 258N000003 HC RX IP 258 OP 636: Performed by: PHYSICIAN ASSISTANT

## 2022-08-26 PROCEDURE — 250N000011 HC RX IP 250 OP 636: Performed by: INTERNAL MEDICINE

## 2022-08-26 RX ORDER — ALBUTEROL SULFATE 90 UG/1
1-2 AEROSOL, METERED RESPIRATORY (INHALATION)
Status: CANCELLED
Start: 2022-10-28

## 2022-08-26 RX ORDER — DIPHENHYDRAMINE HCL 25 MG
50 CAPSULE ORAL ONCE
Status: CANCELLED
Start: 2022-09-16

## 2022-08-26 RX ORDER — SODIUM CHLORIDE 9 MG/ML
1000 INJECTION, SOLUTION INTRAVENOUS CONTINUOUS PRN
Status: CANCELLED
Start: 2022-09-16

## 2022-08-26 RX ORDER — EPINEPHRINE 1 MG/ML
0.3 INJECTION, SOLUTION INTRAMUSCULAR; SUBCUTANEOUS EVERY 5 MIN PRN
Status: CANCELLED | OUTPATIENT
Start: 2022-10-28

## 2022-08-26 RX ORDER — LORAZEPAM 2 MG/ML
0.5 INJECTION INTRAMUSCULAR EVERY 4 HOURS PRN
Status: CANCELLED
Start: 2022-10-07

## 2022-08-26 RX ORDER — LORAZEPAM 2 MG/ML
0.5 INJECTION INTRAMUSCULAR EVERY 4 HOURS PRN
Status: CANCELLED
Start: 2022-08-26

## 2022-08-26 RX ORDER — EPINEPHRINE 0.3 MG/.3ML
0.3 INJECTION SUBCUTANEOUS EVERY 5 MIN PRN
Status: CANCELLED | OUTPATIENT
Start: 2022-10-28

## 2022-08-26 RX ORDER — ALBUTEROL SULFATE 0.83 MG/ML
2.5 SOLUTION RESPIRATORY (INHALATION)
Status: CANCELLED | OUTPATIENT
Start: 2022-10-28

## 2022-08-26 RX ORDER — ALBUTEROL SULFATE 90 UG/1
1-2 AEROSOL, METERED RESPIRATORY (INHALATION)
Status: CANCELLED
Start: 2022-09-16

## 2022-08-26 RX ORDER — ALBUTEROL SULFATE 0.83 MG/ML
2.5 SOLUTION RESPIRATORY (INHALATION)
Status: CANCELLED | OUTPATIENT
Start: 2022-10-07

## 2022-08-26 RX ORDER — HEPARIN SODIUM (PORCINE) LOCK FLUSH IV SOLN 100 UNIT/ML 100 UNIT/ML
5 SOLUTION INTRAVENOUS EVERY 8 HOURS
Status: CANCELLED | OUTPATIENT
Start: 2022-09-16

## 2022-08-26 RX ORDER — SODIUM CHLORIDE 9 MG/ML
1000 INJECTION, SOLUTION INTRAVENOUS CONTINUOUS PRN
Status: CANCELLED
Start: 2022-10-07

## 2022-08-26 RX ORDER — METHYLPREDNISOLONE SODIUM SUCCINATE 125 MG/2ML
125 INJECTION, POWDER, LYOPHILIZED, FOR SOLUTION INTRAMUSCULAR; INTRAVENOUS
Status: CANCELLED
Start: 2022-08-26

## 2022-08-26 RX ORDER — ACETAMINOPHEN 325 MG/1
650 TABLET ORAL
Status: CANCELLED | OUTPATIENT
Start: 2022-10-07

## 2022-08-26 RX ORDER — ALBUTEROL SULFATE 0.83 MG/ML
2.5 SOLUTION RESPIRATORY (INHALATION)
Status: CANCELLED | OUTPATIENT
Start: 2022-08-26

## 2022-08-26 RX ORDER — SODIUM CHLORIDE 9 MG/ML
1000 INJECTION, SOLUTION INTRAVENOUS CONTINUOUS PRN
Status: CANCELLED
Start: 2022-10-28

## 2022-08-26 RX ORDER — EPINEPHRINE 1 MG/ML
0.3 INJECTION, SOLUTION INTRAMUSCULAR; SUBCUTANEOUS EVERY 5 MIN PRN
Status: CANCELLED | OUTPATIENT
Start: 2022-08-26

## 2022-08-26 RX ORDER — EPINEPHRINE 1 MG/ML
0.3 INJECTION, SOLUTION INTRAMUSCULAR; SUBCUTANEOUS EVERY 5 MIN PRN
Status: CANCELLED | OUTPATIENT
Start: 2022-09-16

## 2022-08-26 RX ORDER — LORAZEPAM 2 MG/ML
0.5 INJECTION INTRAMUSCULAR EVERY 4 HOURS PRN
Status: CANCELLED
Start: 2022-09-16

## 2022-08-26 RX ORDER — HEPARIN SODIUM (PORCINE) LOCK FLUSH IV SOLN 100 UNIT/ML 100 UNIT/ML
500 SOLUTION INTRAVENOUS ONCE
Status: COMPLETED | OUTPATIENT
Start: 2022-08-26 | End: 2022-08-26

## 2022-08-26 RX ORDER — METHYLPREDNISOLONE SODIUM SUCCINATE 125 MG/2ML
125 INJECTION, POWDER, LYOPHILIZED, FOR SOLUTION INTRAMUSCULAR; INTRAVENOUS
Status: CANCELLED
Start: 2022-10-28

## 2022-08-26 RX ORDER — DIPHENHYDRAMINE HCL 25 MG
50 CAPSULE ORAL ONCE
Status: CANCELLED
Start: 2022-10-28

## 2022-08-26 RX ORDER — ACETAMINOPHEN 325 MG/1
650 TABLET ORAL
Status: CANCELLED | OUTPATIENT
Start: 2022-10-28

## 2022-08-26 RX ORDER — DIPHENHYDRAMINE HCL 25 MG
50 CAPSULE ORAL ONCE
Status: CANCELLED
Start: 2022-10-07

## 2022-08-26 RX ORDER — ACETAMINOPHEN 325 MG/1
650 TABLET ORAL
Status: CANCELLED | OUTPATIENT
Start: 2022-09-16

## 2022-08-26 RX ORDER — METHYLPREDNISOLONE SODIUM SUCCINATE 125 MG/2ML
125 INJECTION, POWDER, LYOPHILIZED, FOR SOLUTION INTRAMUSCULAR; INTRAVENOUS
Status: CANCELLED
Start: 2022-10-07

## 2022-08-26 RX ORDER — METHYLPREDNISOLONE SODIUM SUCCINATE 125 MG/2ML
125 INJECTION, POWDER, LYOPHILIZED, FOR SOLUTION INTRAMUSCULAR; INTRAVENOUS
Status: CANCELLED
Start: 2022-09-16

## 2022-08-26 RX ORDER — DIPHENHYDRAMINE HCL 25 MG
50 CAPSULE ORAL ONCE
Status: CANCELLED
Start: 2022-08-26

## 2022-08-26 RX ORDER — LORAZEPAM 2 MG/ML
0.5 INJECTION INTRAMUSCULAR EVERY 4 HOURS PRN
Status: CANCELLED
Start: 2022-10-28

## 2022-08-26 RX ORDER — EPINEPHRINE 1 MG/ML
0.3 INJECTION, SOLUTION INTRAMUSCULAR; SUBCUTANEOUS EVERY 5 MIN PRN
Status: CANCELLED | OUTPATIENT
Start: 2022-10-07

## 2022-08-26 RX ORDER — HEPARIN SODIUM (PORCINE) LOCK FLUSH IV SOLN 100 UNIT/ML 100 UNIT/ML
5 SOLUTION INTRAVENOUS EVERY 8 HOURS
Status: CANCELLED | OUTPATIENT
Start: 2022-10-28

## 2022-08-26 RX ORDER — HEPARIN SODIUM (PORCINE) LOCK FLUSH IV SOLN 100 UNIT/ML 100 UNIT/ML
5 SOLUTION INTRAVENOUS EVERY 8 HOURS
Status: CANCELLED | OUTPATIENT
Start: 2022-10-07

## 2022-08-26 RX ORDER — HEPARIN SODIUM (PORCINE) LOCK FLUSH IV SOLN 100 UNIT/ML 100 UNIT/ML
5 SOLUTION INTRAVENOUS EVERY 8 HOURS
Status: DISCONTINUED | OUTPATIENT
Start: 2022-08-26 | End: 2022-08-26 | Stop reason: HOSPADM

## 2022-08-26 RX ORDER — ACETAMINOPHEN 325 MG/1
650 TABLET ORAL
Status: CANCELLED | OUTPATIENT
Start: 2022-08-26

## 2022-08-26 RX ORDER — DIPHENHYDRAMINE HYDROCHLORIDE 50 MG/ML
50 INJECTION INTRAMUSCULAR; INTRAVENOUS
Status: CANCELLED
Start: 2022-09-16

## 2022-08-26 RX ORDER — EPINEPHRINE 0.3 MG/.3ML
0.3 INJECTION SUBCUTANEOUS EVERY 5 MIN PRN
Status: CANCELLED | OUTPATIENT
Start: 2022-09-16

## 2022-08-26 RX ORDER — EPINEPHRINE 0.3 MG/.3ML
0.3 INJECTION SUBCUTANEOUS EVERY 5 MIN PRN
Status: CANCELLED | OUTPATIENT
Start: 2022-10-07

## 2022-08-26 RX ORDER — DIPHENHYDRAMINE HYDROCHLORIDE 50 MG/ML
50 INJECTION INTRAMUSCULAR; INTRAVENOUS
Status: CANCELLED
Start: 2022-10-28

## 2022-08-26 RX ORDER — EPINEPHRINE 0.3 MG/.3ML
0.3 INJECTION SUBCUTANEOUS EVERY 5 MIN PRN
Status: CANCELLED | OUTPATIENT
Start: 2022-08-26

## 2022-08-26 RX ORDER — DIPHENHYDRAMINE HYDROCHLORIDE 50 MG/ML
50 INJECTION INTRAMUSCULAR; INTRAVENOUS
Status: CANCELLED
Start: 2022-10-07

## 2022-08-26 RX ORDER — DIPHENHYDRAMINE HYDROCHLORIDE 50 MG/ML
50 INJECTION INTRAMUSCULAR; INTRAVENOUS
Status: CANCELLED
Start: 2022-08-26

## 2022-08-26 RX ORDER — ALBUTEROL SULFATE 90 UG/1
1-2 AEROSOL, METERED RESPIRATORY (INHALATION)
Status: CANCELLED
Start: 2022-10-07

## 2022-08-26 RX ORDER — SODIUM CHLORIDE 9 MG/ML
1000 INJECTION, SOLUTION INTRAVENOUS CONTINUOUS PRN
Status: CANCELLED
Start: 2022-08-26

## 2022-08-26 RX ORDER — ALBUTEROL SULFATE 90 UG/1
1-2 AEROSOL, METERED RESPIRATORY (INHALATION)
Status: CANCELLED
Start: 2022-08-26

## 2022-08-26 RX ORDER — ALBUTEROL SULFATE 0.83 MG/ML
2.5 SOLUTION RESPIRATORY (INHALATION)
Status: CANCELLED | OUTPATIENT
Start: 2022-09-16

## 2022-08-26 RX ADMIN — SODIUM CHLORIDE 250 ML: 9 INJECTION, SOLUTION INTRAVENOUS at 10:26

## 2022-08-26 RX ADMIN — Medication 500 UNITS: at 09:19

## 2022-08-26 RX ADMIN — Medication 5 ML: at 11:01

## 2022-08-26 RX ADMIN — TRASTUZUMAB 450 MG: 150 INJECTION, POWDER, LYOPHILIZED, FOR SOLUTION INTRAVENOUS at 10:26

## 2022-08-26 ASSESSMENT — PAIN SCALES - GENERAL: PAINLEVEL: NO PAIN (0)

## 2022-08-26 NOTE — PROGRESS NOTES
Infusion Nursing Note:  Hoda Brush presents today for Cycle 85, Day 1 Herceptin.    Patient seen by provider today: No   present during visit today: Not Applicable.    Note: Pt assessed upon arrival to infusion suite. Denies fever, chills, cough, SOB or other signs/symptoms of infection. No new issues or concerns to report.     Intravenous Access:  Implanted Port.    Treatment Conditions:  Lab Results   Component Value Date    HGB 12.3 08/26/2022    WBC 8.0 08/26/2022    ANEU 3.1 06/22/2021    ANEUTAUTO 4.4 08/26/2022     08/26/2022      Lab Results   Component Value Date     08/26/2022    POTASSIUM 4.2 08/26/2022    CR 0.78 08/26/2022    TAYLER 8.8 08/26/2022    BILITOTAL 0.4 08/26/2022    ALBUMIN 3.3 (L) 08/26/2022    ALT 43 08/26/2022    AST 31 08/26/2022     Results reviewed, labs MET treatment parameters, ok to proceed with treatment.  ECHO completed 6/22/22 EF 55-60%    Post Infusion Assessment:  Patient tolerated infusion without incident.  Blood return noted pre and post infusion.  Site patent and intact, free from redness, edema or discomfort.  No evidence of extravasations.  Access discontinued per protocol.     Discharge Plan:   Patient declined prescription refills.  AVS to patient via [employer].  Patient will return 9/15/22 for next appointment.   Patient discharged in stable condition accompanied by: self.  Departure Mode: Ambulatory.      Nathaly Amezcua RN

## 2022-08-26 NOTE — NURSING NOTE
"Chief Complaint   Patient presents with     Port Draw     Labs drawn via port by RN. Vitals taken.     Port accessed with 20G 3/4\" flat needle by RN, labs collected, line flushed with saline and heparin.  Vitals taken. Pt checked in for appointment(s).    Jenny Sow RN    "

## 2022-09-02 ENCOUNTER — PATIENT OUTREACH (OUTPATIENT)
Dept: GERIATRIC MEDICINE | Facility: CLINIC | Age: 67
End: 2022-09-02

## 2022-09-02 NOTE — PROGRESS NOTES
Opened encounter to open new episode per regulatory reassessment.   Lakesha Ventura RN  [employer]  [phone]

## 2022-09-02 NOTE — PROGRESS NOTES
[facility] Care Coordination Contact    Called member to schedule annual HRA home visit. HRA has been scheduled for 9/8/22.   Lakesha Ventura RN  [facility]  [phone]

## 2022-09-02 NOTE — PROGRESS NOTES
Opened encounter to close episode per regulatory reassessment.   Lakesha Ventura RN  [employer]  [phone]

## 2022-09-08 ENCOUNTER — PATIENT OUTREACH (OUTPATIENT)
Dept: GERIATRIC MEDICINE | Facility: CLINIC | Age: 67
End: 2022-09-08

## 2022-09-08 ASSESSMENT — ACTIVITIES OF DAILY LIVING (ADL): DEPENDENT_IADLS:: INDEPENDENT

## 2022-09-08 NOTE — PROGRESS NOTES
[facility] Care Coordination Contact    [facility] Home Visit Assessment     Home visit for Health Risk Assessment with Hoda Brush completed on September 8, 2022    Type of residence:: Apartment  Current living arrangement:: I live in a private home with spouse     Assessment completed with:: Patient, Care Team Member, Other ()    Current Care Plan  Member currently receiving the following home care services:   None  Member currently receiving the following community resources: None    Medication Review  Medication reconciliation completed in Epic: Yes  Medication set-up & administration: Independent and sets up on own weekly.  Self-administers medications.  Medication Risk Assessment Medication (1 or more, place referral to MTM): N/A: No risk factors identified  MTM Referral Placed: No: No risk factors idenified    Mental/Behavioral Health   Depression Screening:   PHQ-2 Total Score (Adult) - Positive if 3 or more points; Administer PHQ-9 if positive: 0       Mental health DX:: No        Falls Assessment:   Fallen 2 or more times in the past year?: No   Any fall with injury in the past year?: No    ADL/IADL Dependencies:   Dependent ADLs:: Independent  Dependent IADLs:: Independent    [facility] Health Plan sponsored benefits: Shared information re: Silver Sneakers/gym memberships, ASA, Calcium +D.    PCA Assessment completed at visit: Not Applicable     Elderly Waiver Eligibility: No-does not meet criteria    Care Plan & Recommendations: Member continues to live independently in apartment with her spouse. Her  and her daughter will help make appointments due to language barrier and drive her to these appointments. Her  and daughter also drive her to the store to do household shopping. No concerns at this time.     See CC for detailed assessment information.    Follow-Up Plan: Member informed of future contact, plan to f/u with member with a 6 month telephone  assessment.  Contact information shared with member and family, encouraged member to call with any questions or concerns at any time.    [place] care continuum providers: Please see Snapshot and Care Management Flowsheets for Specific details of care plan.    This CC note routed to PCP.    Lakesha Ventura RN  [facility]  [phone]

## 2022-09-11 ENCOUNTER — HEALTH MAINTENANCE LETTER (OUTPATIENT)
Age: 67
End: 2022-09-11

## 2022-09-11 NOTE — PROGRESS NOTES
Hoda is a patient from [facility] and is seen here for continuation of care for her metastatic ER+HER2- breast cancer.  She is a refugee from [facility] and was initially seen in clinic with her daughter.  The only data we have from [facility] is an English translation of her records.       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  On 02/11/2014, she was seen by an oncologist.  The clinical staging of T4b N2 M1 was noted.   Her breast cancer was on the left side. There was no right breast cancer, confirmed by the patient and her daughter, correcting a possible error in the translated records.  ER was positive in 100% of the cells, IA at 14% and HER2 was 3+ positive.  It appears that this histopathologic information is on the mastectomy specimen. She underwent an MRI for staging of presumptive bone metastases which was performed 03/02/2014.  There were skeletal metastases in the thoracic vertebrae at 12, L1, L3, L5 and S1 vertebral body, ranging in size from 0.7-3.0 cm in size.  Ischial and right femur were also involved, as well as a large iliac mass measuring about 15 cm in the uterus. 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   Radiation Oncology consultation was performed 03/11/2014, and she was given radiation in 1 dose of 6 Gy to the right iliac lesion.        With the initial diagnosis, she initiated treatment with 6 cycles of CAF neoadjuvant chemotherapy 02/14, 07/07, 03/28, 04/18/14, 05/08 and 05/29/14. She also received monthly zoledronic acid.  She then underwent a modified left mastectomy of Palacios type and left lymphadenoectomy on 06/27/2014.   Pathologic examination showed number at [facility] was [id number] showed infiltrative grade 2 ductal cancer with 6 level 1 metastatic lympFollow up with Jossie for visits and trastuzumab on 2-5, 2-26, 3-19 with CBC, CMP.  Echocardiogram on 3-19.  Follow up with me 4-9 with CBC,  CMP, CA27.29 and CEA and with CT CAP on 4-8.h nodes and 3 level 2 metastatic lymph nodes.  The differentiation was intermediate.  The tumor was ER positive 70% of the cells, AZ positive in 40% of the cells and HER2 was 3+ by immunohistochemistry and the Ki-67 labeling index was 20%. Her staging after surgery was stage IV, pT4b N2 M1.  I don't see a biopsy of the skeletal metastases.  She then had continuation with chemotherapy with 4 cycles of Herceptin and taxane with monthly zoledronic acid.  Tamoxifen was initiated.  She then had two years of Herceptin and a decision was made not to continue further Herceptin.  She continued on zoledronic acid every 3 months. She was clinically stable. [place] then moved to the [place] new refugee from [facility].  She has now been changed to letrozole.  Denosumab has now been held for new diagnosis of osteonecrosis of the R maxilla.     History of cholecystectomy 2020.      TREATMENT HISTORY:  A. Initial diagnosis with metastatic breast cancer in [facility].  Neoadjuvant CAF x 6.   B. Left mastectomy. Left axillary node dissection.  C.  Radiation in 1 dose to R iliac region.  C. Herceptin for 2 years taxane for a prescribed course then stopped, monthly zoledronic acid.  She had 2 years of Herceptin with tamoxifen added after chemotherapy.  D.  Tamoxifen alone and zoledronic acid every 3 months starting 2014.  Letrozole was started   E.  [place]  We restarted Herceptin every 3 weeks and continued tamoxifen. Bone targeted agent changed to denosumab every 9 weeks. Zometa discontinued 2017.  Tamoxifen was changed to letrozole August 2019.    F.  Xgeva discontinued 12-17-19 because of dental issues.   G.  J+J vaccine March, 2021.  H.  Was on Zometa once May 11.  Reaction with eye lid swelling. Discontinued Zometal  H.  Restart Xgeva 6-22-21.  Continuing the trastuzumab and letrozole.  Both tolerated well.       INTERVAL HISTORY  Hoda returns to clinic and is doing entirely well.  CT  scan of the chest, abdomen and pelvis is stable with no new findings.  There is no pain, fatigue, depression or anxiety.    REVIEW OF SYSTEMS:  A 10-point review of systems is entirely negative.  She has no right upper quadrant pain.  She is eating and drinking without difficulty.  She is eating a Mediterranean-style diet.  She is exercising about 150 minutes a week.  I discussed with her making plans for exercise during the winter.  She does not drink alcohol.  She is taking calcium and vitamin D.    REVIEW OF SYSTEMS:  The remainder of a 10 point ROS was negative.      PHYSICAL EXAMINATION:    VITAL SIGNS:  /71 (BP Location: Right arm, Patient Position: Sitting, Cuff Size: Adult Large)   Pulse 72   Temp 97.9  F (36.6  C) (Oral)   Resp 16   Wt 80.2 kg (176 lb 14.4 oz)   SpO2 95%   BMI 31.59 kg/m    GENERAL:  Hoda appeared generally well.  She has no alopecia.  HEENT:  Examination of the oropharynx is without lesions.  LYMPH:  There is no palpable cervical, supraclavicular, subclavicular or axillary lymphadenopathy.  BREASTS:  Right breast reveals no masses.  Examination of the left anterior chest wall reveals a mastectomy incision that is well healed without erythema or masses.  LUNGS:  Clear to percussion and auscultation.  HEART:  Regular rate and rhythm.  S1, S2.  ABDOMEN:  Soft, nontender, without hepatosplenomegaly.  EXTREMITIES:  Without edema.  PSYCH:  Mood and affect were normal.     LABORATORY DATA:  ALT, AST slightly elevated. Bilirubin normal.  CBC normal.     Imaging   CT CAP  September 12, 2022  IMPRESSION:  1.  Stable bony metastatic disease.  2.  Stable pulmonary nodules.  3.  No new lesions.                ASSESSMENT AND PLAN:     1.  Hoda Brush is a 67-year-old woman with a history of ER-positive, VA-positive, HER2 positive breast cancer.  She is from [facility], formally from [facility], and came to live in the [place] with [place].  The tumor is ER positive, VA positive  and HER2 positive.  She had metastatic disease at the time of presentation with bone-only metastases by report.  She underwent neoadjuvant CAF, had a left mastectomy, left axillary lymph node dissection, radiation to the right hip, which included the right iliac region where she had metastatic disease.  She was initially on tamoxifen but then was switched to letrozole.  She continues on this and every 3-week trastuzumab.  She has had no evidence of disease progression for the last 3 years.  Markers are low and stable.  We have continued Herceptin every 3 weeks as well as daily letrozole. CT CAP shows stable pulmonary nodules.   2. She continues to do well on her regimen of trastuzumab and letrozole.  She has no joint discomfort with the letrozole and no hot flashes.  Her CT scan is stable and the plan was to repeat the CT scan 4 months.  3.  Continue the letrozole.  Letrozole has been well-tolerated.  No significant joint stiffness.  4. COVID vaccination was recommended with a booster.  She has had Kelton and Kelton and 2 boosters prior, but the last booster was more than 4 months ago.  All of her questions were answered.  4.  Echo. Stable EF. --Echo in  showed normal EF with slight decline 55-60%.    5.  Discussion of bone health and right maxillary osteonecrosis.  She will continue with calcium and vitamin D.  She will have a decision on root canal on July 6 so we will hold the Xgeva for now.  6. Follow up. Trastuzumab today, 10-7, 10-28 with Jamee, 11-17 me. Xgeva 11-17. Trastuzumab 12-8 and 12-29 with Jamee Mckeon. CT CAP and echo on January 16 and follow up with me January 17 with trastuzumab. CBC, CMP all visits.      Thank you for allowing us to participate in this patient's care.      Sincerely,     Lisandro Aguiar M.D.   Professor   Division of Hematology, Oncology, Transplant   Department of Medicine   [employer] [facility] [employer]   [phone]        I spent 35 minutes with the patient more  than 50% of which was in counseling and coordination of care.

## 2022-09-12 ENCOUNTER — OFFICE VISIT (OUTPATIENT)
Dept: FAMILY MEDICINE | Facility: CLINIC | Age: 67
End: 2022-09-12
Payer: COMMERCIAL

## 2022-09-12 ENCOUNTER — ANCILLARY PROCEDURE (OUTPATIENT)
Dept: CT IMAGING | Facility: CLINIC | Age: 67
End: 2022-09-12
Attending: PHYSICIAN ASSISTANT
Payer: COMMERCIAL

## 2022-09-12 ENCOUNTER — PATIENT OUTREACH (OUTPATIENT)
Dept: GERIATRIC MEDICINE | Facility: CLINIC | Age: 67
End: 2022-09-12

## 2022-09-12 VITALS
DIASTOLIC BLOOD PRESSURE: 77 MMHG | OXYGEN SATURATION: 96 % | RESPIRATION RATE: 16 BRPM | WEIGHT: 178.8 LBS | HEART RATE: 78 BPM | BODY MASS INDEX: 31.68 KG/M2 | HEIGHT: 63 IN | TEMPERATURE: 98.1 F | SYSTOLIC BLOOD PRESSURE: 114 MMHG

## 2022-09-12 DIAGNOSIS — Z23 NEED FOR PROPHYLACTIC VACCINATION AND INOCULATION AGAINST INFLUENZA: ICD-10-CM

## 2022-09-12 DIAGNOSIS — C50.919 METASTATIC BREAST CANCER: ICD-10-CM

## 2022-09-12 DIAGNOSIS — Z90.12 S/P MASTECTOMY, LEFT: ICD-10-CM

## 2022-09-12 DIAGNOSIS — E78.5 HYPERLIPIDEMIA, UNSPECIFIED HYPERLIPIDEMIA TYPE: Primary | ICD-10-CM

## 2022-09-12 DIAGNOSIS — G47.00 INSOMNIA, UNSPECIFIED TYPE: ICD-10-CM

## 2022-09-12 LAB
CHOLEST SERPL-MCNC: 252 MG/DL
FASTING STATUS PATIENT QL REPORTED: YES
HDLC SERPL-MCNC: 48 MG/DL
LDLC SERPL CALC-MCNC: 174 MG/DL
NONHDLC SERPL-MCNC: 204 MG/DL
TRIGL SERPL-MCNC: 149 MG/DL

## 2022-09-12 PROCEDURE — 90471 IMMUNIZATION ADMIN: CPT | Performed by: INTERNAL MEDICINE

## 2022-09-12 PROCEDURE — 80061 LIPID PANEL: CPT | Performed by: INTERNAL MEDICINE

## 2022-09-12 PROCEDURE — 74177 CT ABD & PELVIS W/CONTRAST: CPT

## 2022-09-12 PROCEDURE — 99214 OFFICE O/P EST MOD 30 MIN: CPT | Mod: 25 | Performed by: INTERNAL MEDICINE

## 2022-09-12 PROCEDURE — 255N000002 HC RX 255 OP 636: Performed by: PHYSICIAN ASSISTANT

## 2022-09-12 PROCEDURE — 90662 IIV NO PRSV INCREASED AG IM: CPT | Performed by: INTERNAL MEDICINE

## 2022-09-12 PROCEDURE — 36415 COLL VENOUS BLD VENIPUNCTURE: CPT | Performed by: INTERNAL MEDICINE

## 2022-09-12 PROCEDURE — 250N000011 HC RX IP 250 OP 636: Performed by: PHYSICIAN ASSISTANT

## 2022-09-12 RX ORDER — TRAZODONE HYDROCHLORIDE 50 MG/1
25 TABLET, FILM COATED ORAL
Qty: 30 TABLET | Refills: 1 | Status: SHIPPED | OUTPATIENT
Start: 2022-09-12 | End: 2022-11-14

## 2022-09-12 RX ADMIN — Medication 5 ML: at 10:56

## 2022-09-12 RX ADMIN — IOHEXOL 100 ML: 350 INJECTION, SOLUTION INTRAVENOUS at 10:56

## 2022-09-12 ASSESSMENT — PAIN SCALES - GENERAL: PAINLEVEL: NO PAIN (0)

## 2022-09-12 NOTE — LETTER
[facility] Advance Care Planning       Hoda Brush  [address]   [place] [address]      Dear Hoda    You shared with me your interest in receiving information on Advance Care Planning and Health Care Directives. Discussing and making decisions about this part of our health is very important.  A Health Care Directive is a written document that outlines your goals, values, beliefs and choices for health care and medical treatment in the event you are unable to speak for yourself.     We greatly value the opportunity to assist you in documenting your choices and to honor your   wishes. We ve enclosed [place] Health Care Directive, worksheet and resources to help you get started thinking about your values and goals. We have several options for additional resources:       Health Care Directives and Advance Care Planning resources can be viewed and printed   for free at our web site:  [email or web address].       Free group classes on Advance Care Planning and completing a Health Care Directive are available at multiple locations and times. These classes are led by trained staff who will provide information and guide you through a Health Care Directive.  They can also review, notarize and add your Health Care Directive to your medical record. [place] for a class at [email or web address] or by calling [employer] [facility] at [phone] or toll free [phone].      COPIES of completed Health Care Directives can be brought or mailed to any of our   locations, including the address listed below. You can also email a copy to [email or web address] .      Email or call me at the contact information listed below for questions, assistance, or to   make an appointment to discuss creating a Health Care Directive. You can also contact   our [facility] Department for questions or assistance.       Sincerely,     Lakesha Ventura RN, PHN/[facility] Care Coordinator  Phone   [phone] / Fax [phone]  [email or web address]

## 2022-09-12 NOTE — PROGRESS NOTES
"  Assessment & Plan     Hoda was seen today for follow up and imm/inj.    Diagnoses and all orders for this visit:    Hyperlipidemia, unspecified hyperlipidemia type  -     Lipid panel reflex to direct LDL Fasting  She is on low-cholesterol low-fat diet    Metastatic breast cancer (H)  Per oncology note:  Hoda Brush is a 66-year-old woman with a history of ER-positive, FL-positive, HER2 positive breast cancer.  She is from [facility], formally from [facility], and came to live in the [place] with [place].  The tumor is ER positive, FL positive and HER2 positive.  She had metastatic disease at the time of presentation with bone-only metastases by report.  She underwent neoadjuvant CAF, had a left mastectomy, left axillary lymph node dissection, radiation to the right hip, which included the right iliac region where she had metastatic disease.  She was initially on tamoxifen but then was switched to letrozole.  She continues on this and every 3-week trastuzumab.  She has had no evidence of disease progression for the last 3 years.  We have continued Herceptin every 3 weeks as well as daily letrozole    Patient has very positive attitude and tolerating current treatment well    S/P mastectomy, left  Past history due to breast cancer    Insomnia, unspecified type  -     traZODone (DESYREL) 50 MG tablet; Take 0.5 tablets (25 mg) by mouth nightly as needed for sleep  She uses that sparingly    Need for prophylactic vaccination and inoculation against influenza  -     INFLUENZA, QUAD, HIGH DOSE, PF, 65YR + (FLUZONE HD)    Preventive health counseling was also done.         BMI:   Estimated body mass index is 31.93 kg/m  as calculated from the following:    Height as of this encounter: 1.594 m (5' 2.75\").    Weight as of this encounter: 81.1 kg (178 lb 12.8 oz).   Weight management plan: Discussed healthy diet and exercise guidelines    See Patient Instructions  Patient Instructions   Flu shot   Labs   Follow " "up in 6 months  Seek sooner medical attention if there is any worsening of symptoms or problems.       Return in about 4 months (around 1/12/2023) for medication follow up, wellness visit.    Laury Dee MD  [facility] TALI Drummond is a 67 year old, presenting for the following health issues:  Follow Up and Imm/Inj (Flu Shot)      History of Present Illness       Reason for visit:  Check-up    She eats 4 or more servings of fruits and vegetables daily.She consumes 1 sweetened beverage(s) daily.She exercises with enough effort to increase her heart rate 20 to 29 minutes per day.  She exercises with enough effort to increase her heart rate 5 days per week.   She is taking medications regularly.       Doing well  Tolerating herceptin  Takes english classes       Review of Systems   Constitutional, HEENT, cardiovascular, pulmonary, GI, , musculoskeletal, neuro, skin, endocrine and psych systems are negative, except as otherwise noted.      Objective    /77 (BP Location: Right arm, Patient Position: Sitting, Cuff Size: Adult Large)   Pulse 78   Temp 98.1  F (36.7  C) (Oral)   Resp 16   Ht 1.594 m (5' 2.75\")   Wt 81.1 kg (178 lb 12.8 oz)   SpO2 96%   BMI 31.93 kg/m    Body mass index is 31.93 kg/m .  Physical Exam       GENERAL APPEARANCE: healthy, alert and no distress  EYES: Eyes grossly normal to inspection, PERRL and conjunctivae and sclerae normal  HENT: ear canals and TM's normal and nose and mouth without ulcers or lesions  NECK: no adenopathy  RESP: lungs clear to auscultation - no rales, rhonchi or wheezes  CV: regular rates and rhythm, normal S1 S2, no S3      Disclaimer: This note consists of symbols derived from keyboarding, dictation and/or voice recognition software. As a result, there may be errors in the script that have gone undetected. Please consider this when interpreting information found in this chart.                "

## 2022-09-12 NOTE — PATIENT INSTRUCTIONS
Flu shot   Labs   Follow up in 6 months  Seek sooner medical attention if there is any worsening of symptoms or problems.

## 2022-09-12 NOTE — LETTER
September 12, 2022    NATASHA LOUIS  [address]   [place] [address]        Dear Natasha:    At [facility], we re dedicated to improving your health and wellness. Enclosed is the Care Plan developed with you on 9/8/2022. Please review the Care Plan carefully.    As a reminder, during your visit we talked about:    Ways to manage your physical and mental health    Using health care to maintain and improve your health     Your preventive care needs     Remember to contact your care coordinator if you:    Are hospitalized, or plan to be hospitalized     Have a fall      Have a change in your physical or mental health    Need help finding support or services    If you have questions, or don t agree with your Care Plan, call me at [phone]. You can also call me if your needs change. TTY users, call the [place] [employer] at (673) or 1-394.489.1333 (zjmqjw-ur-dkgtwh relay service).    Sincerely,        Lakesha Ventura RN, BSN, PHN  [phone]  [email or web address]            [id number] accepted    [id number] (07/2022)

## 2022-09-12 NOTE — PROGRESS NOTES
[facility] Care Coordination Contact    Received after visit chart from care coordinator.  Completed following tasks: Mailed copy of care plan to client, Mailed copy of POC signature sheet for member to sign and return in SASE , Sent the following resources/forms: HCD short form  and Mailed [facility] Safe Medication Disposal      Chey Hanley  Case Management Specialist  [facility]  [phone]

## 2022-09-13 NOTE — RESULT ENCOUNTER NOTE
Phil Drummond,    This is to inform you regarding your test result.    Your total cholesterol is elevated.  HDL which is called good cholesterol is low.  Your LDL which is called bad cholesterol is elevated.  Eat low cholesterol low fat  diet and do regular physical activity. Avoid high sugar containing food.  You should be on cholesterol lowering medication .  If you agree then let me know and I can send the script of Lipitor to pharmacy.          Sincerely,      Dr.Nasima Luiz MD,FACP

## 2022-09-15 ENCOUNTER — ANCILLARY PROCEDURE (OUTPATIENT)
Dept: CARDIOLOGY | Facility: CLINIC | Age: 67
End: 2022-09-15
Attending: PHYSICIAN ASSISTANT
Payer: COMMERCIAL

## 2022-09-15 ENCOUNTER — ONCOLOGY VISIT (OUTPATIENT)
Dept: ONCOLOGY | Facility: CLINIC | Age: 67
End: 2022-09-15
Attending: INTERNAL MEDICINE
Payer: COMMERCIAL

## 2022-09-15 ENCOUNTER — APPOINTMENT (OUTPATIENT)
Dept: LAB | Facility: CLINIC | Age: 67
End: 2022-09-15
Attending: INTERNAL MEDICINE
Payer: COMMERCIAL

## 2022-09-15 VITALS
WEIGHT: 176.9 LBS | TEMPERATURE: 97.9 F | DIASTOLIC BLOOD PRESSURE: 71 MMHG | OXYGEN SATURATION: 95 % | BODY MASS INDEX: 31.59 KG/M2 | RESPIRATION RATE: 16 BRPM | SYSTOLIC BLOOD PRESSURE: 106 MMHG | HEART RATE: 72 BPM

## 2022-09-15 DIAGNOSIS — C50.912 MALIGNANT NEOPLASM OF LEFT FEMALE BREAST, UNSPECIFIED ESTROGEN RECEPTOR STATUS, UNSPECIFIED SITE OF BREAST (H): Primary | ICD-10-CM

## 2022-09-15 DIAGNOSIS — C50.919 METASTATIC BREAST CANCER: ICD-10-CM

## 2022-09-15 LAB
ALBUMIN SERPL BCG-MCNC: 4.1 G/DL (ref 3.5–5.2)
ALP SERPL-CCNC: 55 U/L (ref 35–104)
ALT SERPL W P-5'-P-CCNC: 38 U/L (ref 10–35)
ANION GAP SERPL CALCULATED.3IONS-SCNC: 12 MMOL/L (ref 7–15)
AST SERPL W P-5'-P-CCNC: 43 U/L (ref 10–35)
BASOPHILS # BLD AUTO: 0 10E3/UL (ref 0–0.2)
BASOPHILS NFR BLD AUTO: 0 %
BILIRUB SERPL-MCNC: 0.3 MG/DL
BUN SERPL-MCNC: 12.2 MG/DL (ref 8–23)
CALCIUM SERPL-MCNC: 9.7 MG/DL (ref 8.8–10.2)
CEA SERPL-MCNC: 1.2 NG/ML
CHLORIDE SERPL-SCNC: 103 MMOL/L (ref 98–107)
CREAT SERPL-MCNC: 0.85 MG/DL (ref 0.51–0.95)
DEPRECATED HCO3 PLAS-SCNC: 26 MMOL/L (ref 22–29)
EOSINOPHIL # BLD AUTO: 0.3 10E3/UL (ref 0–0.7)
EOSINOPHIL NFR BLD AUTO: 4 %
ERYTHROCYTE [DISTWIDTH] IN BLOOD BY AUTOMATED COUNT: 13.8 % (ref 10–15)
GFR SERPL CREATININE-BSD FRML MDRD: 75 ML/MIN/1.73M2
GLUCOSE SERPL-MCNC: 92 MG/DL (ref 70–99)
HCT VFR BLD AUTO: 40.7 % (ref 35–47)
HGB BLD-MCNC: 12.7 G/DL (ref 11.7–15.7)
IMM GRANULOCYTES # BLD: 0 10E3/UL
IMM GRANULOCYTES NFR BLD: 0 %
LVEF ECHO: NORMAL
LYMPHOCYTES # BLD AUTO: 2.7 10E3/UL (ref 0.8–5.3)
LYMPHOCYTES NFR BLD AUTO: 36 %
MCH RBC QN AUTO: 29.1 PG (ref 26.5–33)
MCHC RBC AUTO-ENTMCNC: 31.2 G/DL (ref 31.5–36.5)
MCV RBC AUTO: 93 FL (ref 78–100)
MONOCYTES # BLD AUTO: 0.5 10E3/UL (ref 0–1.3)
MONOCYTES NFR BLD AUTO: 6 %
NEUTROPHILS # BLD AUTO: 4 10E3/UL (ref 1.6–8.3)
NEUTROPHILS NFR BLD AUTO: 54 %
NRBC # BLD AUTO: 0 10E3/UL
NRBC BLD AUTO-RTO: 0 /100
PLATELET # BLD AUTO: 203 10E3/UL (ref 150–450)
POTASSIUM SERPL-SCNC: 4.4 MMOL/L (ref 3.4–5.3)
PROT SERPL-MCNC: 7.8 G/DL (ref 6.4–8.3)
RBC # BLD AUTO: 4.36 10E6/UL (ref 3.8–5.2)
SODIUM SERPL-SCNC: 141 MMOL/L (ref 136–145)
WBC # BLD AUTO: 7.4 10E3/UL (ref 4–11)

## 2022-09-15 PROCEDURE — 82378 CARCINOEMBRYONIC ANTIGEN: CPT | Performed by: PHYSICIAN ASSISTANT

## 2022-09-15 PROCEDURE — 99213 OFFICE O/P EST LOW 20 MIN: CPT | Performed by: INTERNAL MEDICINE

## 2022-09-15 PROCEDURE — 96413 CHEMO IV INFUSION 1 HR: CPT

## 2022-09-15 PROCEDURE — 36415 COLL VENOUS BLD VENIPUNCTURE: CPT | Performed by: PHYSICIAN ASSISTANT

## 2022-09-15 PROCEDURE — 86300 IMMUNOASSAY TUMOR CA 15-3: CPT | Performed by: PHYSICIAN ASSISTANT

## 2022-09-15 PROCEDURE — 250N000011 HC RX IP 250 OP 636: Performed by: PHYSICIAN ASSISTANT

## 2022-09-15 PROCEDURE — 93325 DOPPLER ECHO COLOR FLOW MAPG: CPT | Mod: GC | Performed by: INTERNAL MEDICINE

## 2022-09-15 PROCEDURE — 258N000003 HC RX IP 258 OP 636: Performed by: PHYSICIAN ASSISTANT

## 2022-09-15 PROCEDURE — 82374 ASSAY BLOOD CARBON DIOXIDE: CPT | Performed by: PHYSICIAN ASSISTANT

## 2022-09-15 PROCEDURE — 250N000011 HC RX IP 250 OP 636: Performed by: INTERNAL MEDICINE

## 2022-09-15 PROCEDURE — 82947 ASSAY GLUCOSE BLOOD QUANT: CPT | Performed by: PHYSICIAN ASSISTANT

## 2022-09-15 PROCEDURE — G0463 HOSPITAL OUTPT CLINIC VISIT: HCPCS

## 2022-09-15 PROCEDURE — 93308 TTE F-UP OR LMTD: CPT | Mod: GC | Performed by: INTERNAL MEDICINE

## 2022-09-15 PROCEDURE — 93321 DOPPLER ECHO F-UP/LMTD STD: CPT | Mod: GC | Performed by: INTERNAL MEDICINE

## 2022-09-15 PROCEDURE — 85025 COMPLETE CBC W/AUTO DIFF WBC: CPT | Performed by: PHYSICIAN ASSISTANT

## 2022-09-15 RX ORDER — HEPARIN SODIUM (PORCINE) LOCK FLUSH IV SOLN 100 UNIT/ML 100 UNIT/ML
5 SOLUTION INTRAVENOUS ONCE
Status: COMPLETED | OUTPATIENT
Start: 2022-09-15 | End: 2022-09-15

## 2022-09-15 RX ORDER — HEPARIN SODIUM (PORCINE) LOCK FLUSH IV SOLN 100 UNIT/ML 100 UNIT/ML
5 SOLUTION INTRAVENOUS EVERY 8 HOURS
Status: DISCONTINUED | OUTPATIENT
Start: 2022-09-15 | End: 2022-09-15 | Stop reason: HOSPADM

## 2022-09-15 RX ADMIN — Medication 5 ML: at 08:41

## 2022-09-15 RX ADMIN — SODIUM CHLORIDE 250 ML: 9 INJECTION, SOLUTION INTRAVENOUS at 10:50

## 2022-09-15 RX ADMIN — Medication 5 ML: at 11:28

## 2022-09-15 RX ADMIN — TRASTUZUMAB 450 MG: 150 INJECTION, POWDER, LYOPHILIZED, FOR SOLUTION INTRAVENOUS at 10:54

## 2022-09-15 NOTE — LETTER
9/15/2022         RE: Hoda Brush  [address]  [facility] [address]        Dear Colleague,    Thank you for referring your patient, Hoda Brush, to the [facility]. Please see a copy of my visit note below.    Hoda is a patient from [facility] and is seen here for continuation of care for her metastatic ER+HER2- breast cancer.  She is a refugee from [facility] and was initially seen in clinic with her daughter.  The only data we have from [facility] is an English translation of her records.       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  On 02/11/2014, she was seen by an oncologist.  The clinical staging of T4b N2 M1 was noted.   Her breast cancer was on the left side. There was no right breast cancer, confirmed by the patient and her daughter, correcting a possible error in the translated records.  ER was positive in 100% of the cells, GA at 14% and HER2 was 3+ positive.  It appears that this histopathologic information is on the mastectomy specimen. She underwent an MRI for staging of presumptive bone metastases which was performed 03/02/2014.  There were skeletal metastases in the thoracic vertebrae at 12, L1, L3, L5 and S1 vertebral body, ranging in size from 0.7-3.0 cm in size.  Ischial and right femur were also involved, as well as a large iliac mass measuring about 15 cm in the uterus. 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   Radiation Oncology consultation was performed 03/11/2014, and she was given radiation in 1 dose of 6 Gy to the right iliac lesion.        With the initial diagnosis, she initiated treatment with 6 cycles of CAF neoadjuvant chemotherapy 02/14, 07/07, 03/28, 04/18/14, 05/08 and 05/29/14. She also received monthly zoledronic acid.  She then underwent a modified left mastectomy of Palacios type and left lymphadenoectomy on 06/27/2014.   Pathologic  examination showed number at [facility] was [id number] showed infiltrative grade 2 ductal cancer with 6 level 1 metastatic lympFollow up with Jossie for visits and trastuzumab on 2-5, 2-26, 3-19 with CBC, CMP.  Echocardiogram on 3-19.  Follow up with me 4-9 with CBC, CMP, CA27.29 and CEA and with CT CAP on 4-8.h nodes and 3 level 2 metastatic lymph nodes.  The differentiation was intermediate.  The tumor was ER positive 70% of the cells, SC positive in 40% of the cells and HER2 was 3+ by immunohistochemistry and the Ki-67 labeling index was 20%. Her staging after surgery was stage IV, pT4b N2 M1.  I don't see a biopsy of the skeletal metastases.  She then had continuation with chemotherapy with 4 cycles of Herceptin and taxane with monthly zoledronic acid.  Tamoxifen was initiated.  She then had two years of Herceptin and a decision was made not to continue further Herceptin.  She continued on zoledronic acid every 3 months. She was clinically stable. [place] then [place] to the [place] new [place] from [facility].  She has now been changed to letrozole.  Denosumab has now been held for new diagnosis of osteonecrosis of the R maxilla.     History of cholecystectomy 2020.      TREATMENT HISTORY:  A. Initial diagnosis with metastatic breast cancer in [facility].  Neoadjuvant CAF x 6.   B. Left mastectomy. Left axillary node dissection.  C.  Radiation in 1 dose to R iliac region.  C. Herceptin for 2 years taxane for a prescribed course then stopped, monthly zoledronic acid.  She had 2 years of Herceptin with tamoxifen added after chemotherapy.  D.  Tamoxifen alone and zoledronic acid every 3 months starting 2014.  Letrozole was started   E.  [place]  We restarted Herceptin every 3 weeks and continued tamoxifen. Bone targeted agent changed to denosumab every 9 weeks. Zometa discontinued 2017.  Tamoxifen was changed to letrozole August 2019.    GIANLUCA  Xgeva discontinued 12-17-19 because of dental issues.   G.  J+J vaccine  March, 2021.  HRadhika  Was on Zometa once May 11.  Reaction with eye lid swelling. Discontinued Zometal  H.  Restart Xgeva 6-22-21.  Continuing the trastuzumab and letrozole.  Both tolerated well.       INTERVAL HISTORY  Hoda returns to clinic and is doing entirely well.  CT scan of the chest, abdomen and pelvis is stable with no new findings.  There is no pain, fatigue, depression or anxiety.    REVIEW OF SYSTEMS:  A 10-point review of systems is entirely negative.  She has no right upper quadrant pain.  She is eating and drinking without difficulty.  She is eating a Mediterranean-style diet.  She is exercising about 150 minutes a week.  I discussed with her making plans for exercise during the winter.  She does not drink alcohol.  She is taking calcium and vitamin D.    REVIEW OF SYSTEMS:  The remainder of a 10 point ROS was negative.      PHYSICAL EXAMINATION:    VITAL SIGNS:  /71 (BP Location: Right arm, Patient Position: Sitting, Cuff Size: Adult Large)   Pulse 72   Temp 97.9  F (36.6  C) (Oral)   Resp 16   Wt 80.2 kg (176 lb 14.4 oz)   SpO2 95%   BMI 31.59 kg/m    GENERAL:  Hoda appeared generally well.  She has no alopecia.  HEENT:  Examination of the oropharynx is without lesions.  LYMPH:  There is no palpable cervical, supraclavicular, subclavicular or axillary lymphadenopathy.  BREASTS:  Right breast reveals no masses.  Examination of the left anterior chest wall reveals a mastectomy incision that is well healed without erythema or masses.  LUNGS:  Clear to percussion and auscultation.  HEART:  Regular rate and rhythm.  S1, S2.  ABDOMEN:  Soft, nontender, without hepatosplenomegaly.  EXTREMITIES:  Without edema.  PSYCH:  Mood and affect were normal.     LABORATORY DATA:  ALT, AST slightly elevated. Bilirubin normal.  CBC normal.     Imaging   CT CAP  September 12, 2022  IMPRESSION:  1.  Stable bony metastatic disease.  2.  Stable pulmonary nodules.  3.  No new lesions.                ASSESSMENT  AND PLAN:     1.  Hoda Brush is a 67-year-old woman with a history of ER-positive, TX-positive, HER2 positive breast cancer.  She is from [facility], formally from [facility], and came to live in the [place] with [place].  The tumor is ER positive, TX positive and HER2 positive.  She had metastatic disease at the time of presentation with bone-only metastases by report.  She underwent neoadjuvant CAF, had a left mastectomy, left axillary lymph node dissection, radiation to the right hip, which included the right iliac region where she had metastatic disease.  She was initially on tamoxifen but then was switched to letrozole.  She continues on this and every 3-week trastuzumab.  She has had no evidence of disease progression for the last 3 years.  Markers are low and stable.  We have continued Herceptin every 3 weeks as well as daily letrozole. CT CAP shows stable pulmonary nodules.   2. She continues to do well on her regimen of trastuzumab and letrozole.  She has no joint discomfort with the letrozole and no hot flashes.  Her CT scan is stable and the plan was to repeat the CT scan 4 months.  3.  Continue the letrozole.  Letrozole has been well-tolerated.  No significant joint stiffness.  4. COVID vaccination was recommended with a booster.  She has had Kelton and Kelton and 2 boosters prior, but the last booster was more than 4 months ago.  All of her questions were answered.  4.  Echo. Stable EF. --Echo in  showed normal EF with slight decline 55-60%.    5.  Discussion of bone health and right maxillary osteonecrosis.  She will continue with calcium and vitamin D.  She will have a decision on root canal on July 6 so we will hold the Xgeva for now.  6. Follow up. Trastuzumab today, 10-7, 10-28 with Jamee, 11-17 me. Xgeva 11-17. Trastuzumab 12-8 and 12-29 with Jamee Mckeon. CT CAP and echo on January 16 and follow up with me January 17 with trastuzumab. CBC, CMP all visits.      Thank you for  allowing us to participate in this patient's care.     I spent 35 minutes with the patient more than 50% of which was in counseling and coordination of care.       Again, thank you for allowing me to participate in the care of your patient.      Sincerely,    Lisandro Aguiar MD

## 2022-09-15 NOTE — PROGRESS NOTES
Infusion Nursing Note:  Hoda Brush presents today for Cycle 86 Day 1 Hereceptin.    Patient seen by provider today: Yes: Dr. Aguiar saw patient prior to infusion    Note: Patient feels well today and has additional questions after her visit with Dr. Aguiar.    Intravenous Access:  Implanted Port.    Treatment Conditions:   Latest Reference Range & Units 09/15/22 08:46   Sodium 136 - 145 mmol/L 141   Potassium 3.4 - 5.3 mmol/L 4.4   Chloride 98 - 107 mmol/L 103   Carbon Dioxide (CO2) 22 - 29 mmol/L 26   Urea Nitrogen 8.0 - 23.0 mg/dL 12.2   Creatinine 0.51 - 0.95 mg/dL 0.85   GFR Estimate >60 mL/min/1.73m2 75   Calcium 8.8 - 10.2 mg/dL 9.7   Anion Gap 7 - 15 mmol/L 12   Albumin 3.5 - 5.2 g/dL 4.1   Protein Total 6.4 - 8.3 g/dL 7.8   Alkaline Phosphatase 35 - 104 U/L 55   ALT 10 - 35 U/L 38 (H)   AST 10 - 35 U/L 43 (H)   Bilirubin Total <=1.2 mg/dL 0.3   Glucose 70 - 99 mg/dL 92   WBC 4.0 - 11.0 10e3/uL 7.4   Hemoglobin 11.7 - 15.7 g/dL 12.7   Hematocrit 35.0 - 47.0 % 40.7   Platelet Count 150 - 450 10e3/uL 203   RBC Count 3.80 - 5.20 10e6/uL 4.36   MCV 78 - 100 fL 93   MCH 26.5 - 33.0 pg 29.1   MCHC 31.5 - 36.5 g/dL 31.2 (L)   RDW 10.0 - 15.0 % 13.8   % Neutrophils % 54   % Lymphocytes % 36   % Monocytes % 6   % Eosinophils % 4   % Basophils % 0   Absolute Basophils 0.0 - 0.2 10e3/uL 0.0   Absolute Eosinophils 0.0 - 0.7 10e3/uL 0.3   Absolute Immature Granulocytes <=0.4 10e3/uL 0.0   Absolute Lymphocytes 0.8 - 5.3 10e3/uL 2.7   Absolute Monocytes 0.0 - 1.3 10e3/uL 0.5   % Immature Granulocytes % 0   Absolute Neutrophils 1.6 - 8.3 10e3/uL 4.0   Absolute NRBCs 10e3/uL 0.0   NRBCs per 100 WBC <1 /100 0     Results reviewed, labs MET treatment parameters, ok to proceed with treatment.  ECHO/MUGA completed 9/15/22  EF 55-60%.    Post Infusion Assessment:  Patient tolerated infusion without incident.  Blood return noted pre and post infusion.  Site patent and intact, free from redness, edema or discomfort.  No  evidence of extravasations.  Access discontinued per protocol.     Discharge Plan:   Patient declined prescription refills.  Discharge instructions reviewed with: Patient.  Patient and/or family verbalized understanding of discharge instructions and all questions answered.  AVS to patient via [employer].  Patient will return 10/7/22 for next appointment.   Patient discharged in stable condition accompanied by: self.  Departure Mode: Ambulatory.      Shannan Stewart RN

## 2022-09-15 NOTE — PATIENT INSTRUCTIONS
Contact Numbers  [facility]: [phone] (for symptom and scheduling needs)    Please call the [facility] Triage line if you experience a temperature greater than or equal to 100.4, shaking chills, have uncontrolled nausea, vomiting and/or diarrhea, dizziness, shortness of breath, chest pain, bleeding, unexplained bruising, or if you have any other new/concerning symptoms, questions or concerns.     If you are having any concerning symptoms or wish to speak to a provider before your next infusion visit, please call your care coordinator or triage to notify them so we can adequately serve you.     If you need a refill on a narcotic prescription or other medication, please call triage before your infusion appointment.

## 2022-09-15 NOTE — NURSING NOTE
"Oncology Rooming Note    September 15, 2022 9:00 AM   Hoda Brush is a 67 year old female who presents for:    Chief Complaint   Patient presents with     Port Draw     Labs drawn via port by RN. Vitals taken.     Oncology Clinic Visit     Malignant neoplasm of left female breast     Initial Vitals: /71 (BP Location: Right arm, Patient Position: Sitting, Cuff Size: Adult Large)   Pulse 72   Temp 97.9  F (36.6  C) (Oral)   Resp 16   Wt 80.2 kg (176 lb 14.4 oz)   SpO2 95%   BMI 31.59 kg/m   Estimated body mass index is 31.59 kg/m  as calculated from the following:    Height as of 9/12/22: 1.594 m (5' 2.75\").    Weight as of this encounter: 80.2 kg (176 lb 14.4 oz). Body surface area is 1.88 meters squared.  No Pain (0) Comment: Data Unavailable   No LMP recorded. Patient is postmenopausal.  Allergies reviewed: Yes  Medications reviewed: Yes    Medications: Medication refills not needed today.  Pharmacy name entered into [employer]: [facility] #[id number] [place], [place] - [address]    Clinical concerns: none.      Naun Sena"

## 2022-09-15 NOTE — NURSING NOTE
"Chief Complaint   Patient presents with     Port Draw     Labs drawn via port by RN. Vitals taken.     Labs drawn via port by RN. Port accessed with 20G 3/4\" power needle. Flushed with NS and heparin. Pt tolerated well. Vitals taken. Pt checked in for next appointment.    Bouchra Rowland, RN  "

## 2022-09-18 LAB — CANCER AG27-29 SERPL-ACNC: 9.7 U/ML

## 2022-09-19 DIAGNOSIS — C50.919 METASTATIC BREAST CANCER: Primary | ICD-10-CM

## 2022-10-02 DIAGNOSIS — C50.919 METASTATIC BREAST CANCER: ICD-10-CM

## 2022-10-02 RX ORDER — LETROZOLE 2.5 MG/1
2.5 TABLET, FILM COATED ORAL DAILY
Qty: 90 TABLET | Refills: 3 | Status: CANCELLED | OUTPATIENT
Start: 2022-10-02

## 2022-10-02 NOTE — TELEPHONE ENCOUNTER
Letrozole Refill   Last prescribing provider: Dr Aguiar     Last clinic visit date: 9/15/22 DR Aguiar     Any missed appointments or no-shows since last clinic visit?: no     Recommendations for requested medication (if none, N/A): Copied from chart note 9/15/22 DR Aguiar   2. She continues to do well on her regimen of trastuzumab and letrozole. She has no joint discomfort with the letrozole and no hot flashes. Her CT scan is stable and the plan was to repeat the CT scan 4 months.  3.  Continue the letrozole.  Letrozole has been well-tolerated.  No significant joint stiffness.      Next clinic visit date: 10/7/22 Jamee Mckeon     Any other pertinent information (if none, N/A): N/A

## 2022-10-07 ENCOUNTER — ONCOLOGY VISIT (OUTPATIENT)
Dept: ONCOLOGY | Facility: CLINIC | Age: 67
End: 2022-10-07
Attending: PHYSICIAN ASSISTANT
Payer: COMMERCIAL

## 2022-10-07 ENCOUNTER — APPOINTMENT (OUTPATIENT)
Dept: LAB | Facility: CLINIC | Age: 67
End: 2022-10-07
Attending: INTERNAL MEDICINE
Payer: COMMERCIAL

## 2022-10-07 VITALS
TEMPERATURE: 98.1 F | WEIGHT: 180.9 LBS | SYSTOLIC BLOOD PRESSURE: 121 MMHG | BODY MASS INDEX: 32.3 KG/M2 | RESPIRATION RATE: 16 BRPM | DIASTOLIC BLOOD PRESSURE: 71 MMHG | HEART RATE: 89 BPM | OXYGEN SATURATION: 95 %

## 2022-10-07 DIAGNOSIS — C50.919 METASTATIC BREAST CANCER: Primary | ICD-10-CM

## 2022-10-07 DIAGNOSIS — C50.912 MALIGNANT NEOPLASM OF LEFT FEMALE BREAST, UNSPECIFIED ESTROGEN RECEPTOR STATUS, UNSPECIFIED SITE OF BREAST (H): Primary | ICD-10-CM

## 2022-10-07 LAB
ALBUMIN SERPL BCG-MCNC: 4.1 G/DL (ref 3.5–5.2)
ALP SERPL-CCNC: 51 U/L (ref 35–104)
ALT SERPL W P-5'-P-CCNC: 40 U/L (ref 10–35)
ANION GAP SERPL CALCULATED.3IONS-SCNC: 10 MMOL/L (ref 7–15)
AST SERPL W P-5'-P-CCNC: 39 U/L (ref 10–35)
BASOPHILS # BLD AUTO: 0 10E3/UL (ref 0–0.2)
BASOPHILS NFR BLD AUTO: 0 %
BILIRUB SERPL-MCNC: 0.2 MG/DL
BUN SERPL-MCNC: 21.6 MG/DL (ref 8–23)
CALCIUM SERPL-MCNC: 9.5 MG/DL (ref 8.8–10.2)
CEA SERPL-MCNC: 1 NG/ML
CHLORIDE SERPL-SCNC: 105 MMOL/L (ref 98–107)
CREAT SERPL-MCNC: 0.72 MG/DL (ref 0.51–0.95)
DEPRECATED HCO3 PLAS-SCNC: 28 MMOL/L (ref 22–29)
EOSINOPHIL # BLD AUTO: 0.2 10E3/UL (ref 0–0.7)
EOSINOPHIL NFR BLD AUTO: 2 %
ERYTHROCYTE [DISTWIDTH] IN BLOOD BY AUTOMATED COUNT: 13.8 % (ref 10–15)
GFR SERPL CREATININE-BSD FRML MDRD: >90 ML/MIN/1.73M2
GLUCOSE SERPL-MCNC: 85 MG/DL (ref 70–99)
HCT VFR BLD AUTO: 37.9 % (ref 35–47)
HGB BLD-MCNC: 12.1 G/DL (ref 11.7–15.7)
IMM GRANULOCYTES # BLD: 0 10E3/UL
IMM GRANULOCYTES NFR BLD: 0 %
LYMPHOCYTES # BLD AUTO: 3.1 10E3/UL (ref 0.8–5.3)
LYMPHOCYTES NFR BLD AUTO: 40 %
MCH RBC QN AUTO: 29.7 PG (ref 26.5–33)
MCHC RBC AUTO-ENTMCNC: 31.9 G/DL (ref 31.5–36.5)
MCV RBC AUTO: 93 FL (ref 78–100)
MONOCYTES # BLD AUTO: 0.6 10E3/UL (ref 0–1.3)
MONOCYTES NFR BLD AUTO: 7 %
NEUTROPHILS # BLD AUTO: 3.9 10E3/UL (ref 1.6–8.3)
NEUTROPHILS NFR BLD AUTO: 51 %
NRBC # BLD AUTO: 0 10E3/UL
NRBC BLD AUTO-RTO: 0 /100
PLATELET # BLD AUTO: 185 10E3/UL (ref 150–450)
POTASSIUM SERPL-SCNC: 4.1 MMOL/L (ref 3.4–5.3)
PROT SERPL-MCNC: 7.6 G/DL (ref 6.4–8.3)
RBC # BLD AUTO: 4.07 10E6/UL (ref 3.8–5.2)
SODIUM SERPL-SCNC: 143 MMOL/L (ref 136–145)
WBC # BLD AUTO: 7.8 10E3/UL (ref 4–11)

## 2022-10-07 PROCEDURE — 80053 COMPREHEN METABOLIC PANEL: CPT | Performed by: PHYSICIAN ASSISTANT

## 2022-10-07 PROCEDURE — 99214 OFFICE O/P EST MOD 30 MIN: CPT | Performed by: PHYSICIAN ASSISTANT

## 2022-10-07 PROCEDURE — 36415 COLL VENOUS BLD VENIPUNCTURE: CPT | Performed by: PHYSICIAN ASSISTANT

## 2022-10-07 PROCEDURE — G0463 HOSPITAL OUTPT CLINIC VISIT: HCPCS

## 2022-10-07 PROCEDURE — 250N000011 HC RX IP 250 OP 636: Performed by: PHYSICIAN ASSISTANT

## 2022-10-07 PROCEDURE — 86300 IMMUNOASSAY TUMOR CA 15-3: CPT | Performed by: PHYSICIAN ASSISTANT

## 2022-10-07 PROCEDURE — 82040 ASSAY OF SERUM ALBUMIN: CPT | Performed by: PHYSICIAN ASSISTANT

## 2022-10-07 PROCEDURE — 82378 CARCINOEMBRYONIC ANTIGEN: CPT | Performed by: PHYSICIAN ASSISTANT

## 2022-10-07 PROCEDURE — 96413 CHEMO IV INFUSION 1 HR: CPT

## 2022-10-07 PROCEDURE — 258N000003 HC RX IP 258 OP 636: Performed by: PHYSICIAN ASSISTANT

## 2022-10-07 PROCEDURE — 85014 HEMATOCRIT: CPT | Performed by: PHYSICIAN ASSISTANT

## 2022-10-07 RX ORDER — HEPARIN SODIUM (PORCINE) LOCK FLUSH IV SOLN 100 UNIT/ML 100 UNIT/ML
500 SOLUTION INTRAVENOUS ONCE
Status: COMPLETED | OUTPATIENT
Start: 2022-10-07 | End: 2022-10-07

## 2022-10-07 RX ORDER — LETROZOLE 2.5 MG/1
2.5 TABLET, FILM COATED ORAL DAILY
Qty: 90 TABLET | Refills: 3 | Status: SHIPPED | OUTPATIENT
Start: 2022-10-07 | End: 2022-10-10

## 2022-10-07 RX ORDER — HEPARIN SODIUM (PORCINE) LOCK FLUSH IV SOLN 100 UNIT/ML 100 UNIT/ML
5 SOLUTION INTRAVENOUS EVERY 8 HOURS
Status: DISCONTINUED | OUTPATIENT
Start: 2022-10-07 | End: 2022-10-07 | Stop reason: HOSPADM

## 2022-10-07 RX ADMIN — Medication 500 UNITS: at 11:58

## 2022-10-07 RX ADMIN — Medication 5 ML: at 14:22

## 2022-10-07 RX ADMIN — SODIUM CHLORIDE 250 ML: 9 INJECTION, SOLUTION INTRAVENOUS at 13:47

## 2022-10-07 RX ADMIN — TRASTUZUMAB 450 MG: 150 INJECTION, POWDER, LYOPHILIZED, FOR SOLUTION INTRAVENOUS at 13:47

## 2022-10-07 ASSESSMENT — PAIN SCALES - GENERAL: PAINLEVEL: NO PAIN (0)

## 2022-10-07 NOTE — PROGRESS NOTES
Infusion Nursing Note:  Hoda Brush presents today for Cycle 87 Day 1 Herceptin.    Patient seen by provider today: Yes: SEMAJ Turner   present during visit today: Not Applicable.    Note: Hoda comes into the clinic today doing well overall and has no concerns to offer following her provider appoinment. She denies pain or s/s of infection.     Intravenous Access:  Implanted Port.    Treatment Conditions:  Lab Results   Component Value Date    HGB 12.1 10/07/2022    WBC 7.8 10/07/2022    ANEU 3.1 06/22/2021    ANEUTAUTO 3.9 10/07/2022     10/07/2022      Lab Results   Component Value Date     10/07/2022    POTASSIUM 4.1 10/07/2022    CR 0.72 10/07/2022    TAYLER 9.5 10/07/2022    BILITOTAL 0.2 10/07/2022    ALBUMIN 4.1 10/07/2022    ALT 40 (H) 10/07/2022    AST 39 (H) 10/07/2022     Results reviewed, labs MET treatment parameters, ok to proceed with treatment.    Post Infusion Assessment:  Patient tolerated infusion without incident.  Blood return noted pre and post infusion.  Site patent and intact, free from redness, edema or discomfort.  No evidence of extravasations.  Access discontinued per protocol.     Discharge Plan:   Patient declined prescription refills.  Discharge instructions reviewed with: Patient.  Patient and/or family verbalized understanding of discharge instructions and all questions answered.  AVS to patient via [employer].  Patient will return 10/28 for next appointment.   Patient discharged in stable condition accompanied by: self.  Departure Mode: Ambulatory.      Delia Sears RN

## 2022-10-07 NOTE — PROGRESS NOTES
Hoda is a patient from [facility] and is seen here for continuation of care for her metastatic ER+HER2- breast cancer.  She is a [place] refugee from [facility] and was initially seen in clinic with her daughter.  The only data we have from [facility] is an English translation of her records.       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  On 02/11/2014, she was seen by an oncologist.  The clinical staging of T4b N2 M1 was noted.   Her breast cancer was on the left side. There was no right breast cancer, confirmed by the patient and her daughter, correcting a possible error in the translated records.  ER was positive in 100% of the cells, NH at 14% and HER2 was 3+ positive.  It appears that this histopathologic information is on the mastectomy specimen. She underwent an MRI for staging of presumptive bone metastases which was performed 03/02/2014.  There were skeletal metastases in the thoracic vertebrae at 12, L1, L3, L5 and S1 vertebral body, ranging in size from 0.7-3.0 cm in size.  Ischial and right femur were also involved, as well as a large iliac mass measuring about 15 cm in the uterus. 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   Radiation Oncology consultation was performed 03/11/2014, and she was given radiation in 1 dose of 6 Gy to the right iliac lesion.        With the initial diagnosis, she initiated treatment with 6 cycles of CAF neoadjuvant chemotherapy 02/14, 07/07, 03/28, 04/18/14, 05/08 and 05/29/14. She also received monthly zoledronic acid.  She then underwent a modified left mastectomy of Palacios type and left lymphadenoectomy on 06/27/2014.   Pathologic examination showed number at [facility] was [id number] showed infiltrative grade 2 ductal cancer with 6 level 1 metastatic lymph nodes and 3 level 2 metastatic lymph nodes.  The differentiation was intermediate.  The tumor was ER positive 70% of the cells, NH  positive in 40% of the cells and HER2 was 3+ by immunohistochemistry and the Ki-67 labeling index was 20%. Her staging after surgery was stage IV, pT4b N2 M1.  I don't see a biopsy of the skeletal metastases.  She then had continuation with chemotherapy with 4 cycles of Herceptin and taxane with monthly zoledronic acid.  Tamoxifen was initiated.  She then had two years of Herceptin and a decision was made not to continue further Herceptin.  She continued on zoledronic acid every 3 months. She was clinically stable. She then moved to the [place] new refugee from [facility].  She has now been changed to letrozole.  Denosumab has now been held for new diagnosis of osteonecrosis of the R maxilla.     History of cholecystectomy 2020.    Seen in the ED on 10/4/2021, for acute abdominal pain.  Elevated LFTs, elevated lipase.  Question of stone versus pancreatitis.  She was discharged and followed up with an outpatient gastroenterology clinic.  They recommended clear liquid diet and ERCP.        TREATMENT HISTORY:  A. Initial diagnosis with metastatic breast cancer in [facility].  Neoadjuvant CAF x 6.   B. Left mastectomy. Left axillary node dissection.  C.  Radiation in 1 dose to R iliac region.  C. Herceptin for 2 years taxane for a prescribed course then stopped, monthly zoledronic acid.  She had 2 years of Herceptin with tamoxifen added after chemotherapy.  D.  Tamoxifen alone and zoledronic acid every 3 months starting 2014.  Letrozole was started   E[place]  [place]  We restarted Herceptin every 3 weeks and continued tamoxifen. Bone targeted agent changed to denosumab every 9 weeks. Zometa discontinued 2017.  Tamoxifen was changed to letrozole August 2019.    F.  Xgeva discontinued 12-17-19 because of dental issues.   G.  J+J vaccine March, 2021.  H.  Was on Zometa once May 11.  Reaction with eye lid swelling. Discontinued Zometal  H.  Restart Xgeva 6-22-21.  Continuing the trastuzumab and letrozole.  Both tolerated  well.       INTERVAL HISTORY   Hoda is feeling well. No issues today. No chest pain, shortness of breath, or LE swelling. No fevers or chills. Breathing is stable. Bowels are stable. No new lumps or bumps, no new aches or pains     Patient denies the following except if noted above: fevers, body aches, chills, headaches, vision changes, dizziness, chest pain, shortness of breath, nausea, vomiting, diarrhea, constipation, abdominal pain, rashes, bruising/bleeding, mouth sores, swelling or pain in the legs.       PHYSICAL EXAM:  /71 (BP Location: Right arm)   Pulse 89   Temp 98.1  F (36.7  C) (Oral)   Resp 16   Wt 82.1 kg (180 lb 14.4 oz)   SpO2 95%   BMI 32.30 kg/m    Wt Readings from Last 4 Encounters:   10/07/22 82.1 kg (180 lb 14.4 oz)   09/15/22 80.2 kg (176 lb 14.4 oz)   09/12/22 81.1 kg (178 lb 12.8 oz)   08/26/22 80.7 kg (177 lb 14.4 oz)     General: Alert, oriented, pleasant, NAD  HEENT: Normocephalic, atraumatic, no icterus   Neck: No cervical or supraclavicular LAD.  Axillary: No LAD  Lungs: CTA bilaterally, normal work of breathing  Cardiac: RRR  Abdomen: Soft, nontender, nondistended. Normoactive bowel sounds. No hepatosplenomegaly, masses  Neuro: CNII-XII grossly intact  Extremities: No pedal edema        Labs  Most Recent 3 CBC's:  Recent Labs   Lab Test 10/07/22  1203 09/15/22  0846 08/26/22  0924   WBC 7.8 7.4 8.0   HGB 12.1 12.7 12.3   MCV 93 93 93    203 215    Most Recent 3 BMP's:  Recent Labs   Lab Test 09/15/22  0846 08/26/22  0924 08/05/22  0638    144 144   POTASSIUM 4.4 4.2 3.9   CHLORIDE 103 109 106   CO2 26 29 29   BUN 12.2 14 17   CR 0.85 0.78 0.81   ANIONGAP 12 6 9   TAYLER 9.7 8.8 8.8   GLC 92 90 99    Most Recent 2 LFT's:  Recent Labs   Lab Test 09/15/22  0846 08/26/22  0924   AST 43* 31   ALT 38* 43   ALKPHOS 55 53   BILITOTAL 0.3 0.4      I reviewed the above labs today.    ASSESSMENT AND PLAN:     1.  Hoda Brush is a 66-year-old woman with a history of  ER-positive, VA-positive, HER2 positive breast cancer.  She is from [facility], formally from [facility], and came to live in the [place] with [place].  The tumor is ER positive, VA positive and HER2 positive.  She had metastatic disease at the time of presentation with bone-only metastases by report.  She underwent neoadjuvant CAF, had a left mastectomy, left axillary lymph node dissection, radiation to the right hip, which included the right iliac region where she had metastatic disease.  She was initially on tamoxifen but then was switched to letrozole.  She continues on this and every 3-week trastuzumab. She has had no evidence of disease progression for the last 3 years.  We have continued Herceptin every 3 weeks as well as daily letrozole.   -- Most recent CT CAP stable   -- Proceed with herceptin today. Continue letrozole   -- Echo every 3 months     2. Bone health  - Xgeva was on hold due to possible ONJ. See Dr. Aguiar's note from 12/16--this was resumed on 1/6/22. Due in August. To be given every 3 months--this was confirmed with Dr. Aguiar.   -Currently no dental issues, Next due in November     3. Insomnia/overactive bladder   - Improved on oxybutynin 5 mg at bedtime.     4. Vaccines  - Up to date on Flu and Covid       Patient will call in the interim with any questions or concerns. They voice understanding and agree with the above plan.     Jamee Mckeon PA-C

## 2022-10-07 NOTE — NURSING NOTE
"Oncology Rooming Note    October 7, 2022 12:18 PM   Hoda Brush is a 67 year old female who presents for:    Chief Complaint   Patient presents with     Port Draw     Labs drawn via port by RN. Vitals taken.     Oncology Clinic Visit     Malignant neoplasm of left female breast, unspecified estrogen receptor status, unspecified site of breast (H) (Primary Dx)      Initial Vitals: /71 (BP Location: Right arm)   Pulse 89   Temp 98.1  F (36.7  C) (Oral)   Resp 16   Wt 82.1 kg (180 lb 14.4 oz)   SpO2 95%   BMI 32.30 kg/m   Estimated body mass index is 32.3 kg/m  as calculated from the following:    Height as of 9/12/22: 1.594 m (5' 2.75\").    Weight as of this encounter: 82.1 kg (180 lb 14.4 oz). Body surface area is 1.91 meters squared.  No Pain (0) Comment: Data Unavailable   No LMP recorded. Patient is postmenopausal.  Allergies reviewed: Yes  Medications reviewed: Yes    Medications: Medication refills not needed today.  Pharmacy [facility] into [employer]: [id number] [place], [place] - [address] AT [address] [place] [address]    Clinical concerns:     Pt is being told her Letrozole prescription requires a refill. Our system tells us she should have 3 refill orders left. She may need a new prescription.     Robby Ruiz            "

## 2022-10-07 NOTE — PATIENT INSTRUCTIONS
[facility] Triage and after hours / weekends / holidays:  [phone]    Please call the triage or after hours line if you experience a temperature greater than or equal to 100.4, shaking chills, have uncontrolled nausea, vomiting and/or diarrhea, dizziness, shortness of breath, chest pain, bleeding, unexplained bruising, or if you have any other new/concerning symptoms, questions or concerns.      If you are having any concerning symptoms or wish to speak to a provider before your next infusion visit, please call your care coordinator or triage to notify them so we can adequately serve you.     If you need a refill on a narcotic prescription or other medication, please call before your infusion appointment.

## 2022-10-10 RX ORDER — LETROZOLE 2.5 MG/1
2.5 TABLET, FILM COATED ORAL DAILY
Qty: 90 TABLET | Refills: 3 | Status: SHIPPED | OUTPATIENT
Start: 2022-10-10 | End: 2023-09-29

## 2022-10-12 LAB — CANCER AG27-29 SERPL-ACNC: 10.3 U/ML

## 2022-10-28 ENCOUNTER — INFUSION THERAPY VISIT (OUTPATIENT)
Dept: ONCOLOGY | Facility: CLINIC | Age: 67
End: 2022-10-28
Attending: PHYSICIAN ASSISTANT
Payer: COMMERCIAL

## 2022-10-28 ENCOUNTER — APPOINTMENT (OUTPATIENT)
Dept: LAB | Facility: CLINIC | Age: 67
End: 2022-10-28
Attending: PHYSICIAN ASSISTANT
Payer: COMMERCIAL

## 2022-10-28 VITALS
DIASTOLIC BLOOD PRESSURE: 66 MMHG | TEMPERATURE: 98.1 F | RESPIRATION RATE: 16 BRPM | OXYGEN SATURATION: 96 % | SYSTOLIC BLOOD PRESSURE: 108 MMHG | HEART RATE: 83 BPM | BODY MASS INDEX: 32.07 KG/M2 | WEIGHT: 179.6 LBS

## 2022-10-28 DIAGNOSIS — C50.919 METASTATIC BREAST CANCER: Primary | ICD-10-CM

## 2022-10-28 DIAGNOSIS — C50.912 MALIGNANT NEOPLASM OF LEFT FEMALE BREAST, UNSPECIFIED ESTROGEN RECEPTOR STATUS, UNSPECIFIED SITE OF BREAST (H): Primary | ICD-10-CM

## 2022-10-28 LAB
ALBUMIN SERPL BCG-MCNC: 4 G/DL (ref 3.5–5.2)
ALP SERPL-CCNC: 50 U/L (ref 35–104)
ALT SERPL W P-5'-P-CCNC: 38 U/L (ref 10–35)
ANION GAP SERPL CALCULATED.3IONS-SCNC: 8 MMOL/L (ref 7–15)
AST SERPL W P-5'-P-CCNC: 40 U/L (ref 10–35)
BASOPHILS # BLD AUTO: 0 10E3/UL (ref 0–0.2)
BASOPHILS NFR BLD AUTO: 0 %
BILIRUB SERPL-MCNC: 0.4 MG/DL
BUN SERPL-MCNC: 16.7 MG/DL (ref 8–23)
CALCIUM SERPL-MCNC: 9.7 MG/DL (ref 8.8–10.2)
CHLORIDE SERPL-SCNC: 105 MMOL/L (ref 98–107)
CREAT SERPL-MCNC: 0.8 MG/DL (ref 0.51–0.95)
DEPRECATED HCO3 PLAS-SCNC: 28 MMOL/L (ref 22–29)
EOSINOPHIL # BLD AUTO: 0.2 10E3/UL (ref 0–0.7)
EOSINOPHIL NFR BLD AUTO: 3 %
ERYTHROCYTE [DISTWIDTH] IN BLOOD BY AUTOMATED COUNT: 13.7 % (ref 10–15)
GFR SERPL CREATININE-BSD FRML MDRD: 81 ML/MIN/1.73M2
GLUCOSE SERPL-MCNC: 118 MG/DL (ref 70–99)
HCT VFR BLD AUTO: 39.4 % (ref 35–47)
HGB BLD-MCNC: 12.3 G/DL (ref 11.7–15.7)
IMM GRANULOCYTES # BLD: 0 10E3/UL
IMM GRANULOCYTES NFR BLD: 0 %
LYMPHOCYTES # BLD AUTO: 2.5 10E3/UL (ref 0.8–5.3)
LYMPHOCYTES NFR BLD AUTO: 36 %
MCH RBC QN AUTO: 29.3 PG (ref 26.5–33)
MCHC RBC AUTO-ENTMCNC: 31.2 G/DL (ref 31.5–36.5)
MCV RBC AUTO: 94 FL (ref 78–100)
MONOCYTES # BLD AUTO: 0.5 10E3/UL (ref 0–1.3)
MONOCYTES NFR BLD AUTO: 7 %
NEUTROPHILS # BLD AUTO: 3.7 10E3/UL (ref 1.6–8.3)
NEUTROPHILS NFR BLD AUTO: 54 %
NRBC # BLD AUTO: 0 10E3/UL
NRBC BLD AUTO-RTO: 0 /100
PLATELET # BLD AUTO: 184 10E3/UL (ref 150–450)
POTASSIUM SERPL-SCNC: 4.1 MMOL/L (ref 3.4–5.3)
PROT SERPL-MCNC: 7.5 G/DL (ref 6.4–8.3)
RBC # BLD AUTO: 4.2 10E6/UL (ref 3.8–5.2)
SODIUM SERPL-SCNC: 141 MMOL/L (ref 136–145)
WBC # BLD AUTO: 7 10E3/UL (ref 4–11)

## 2022-10-28 PROCEDURE — 250N000011 HC RX IP 250 OP 636: Performed by: PHYSICIAN ASSISTANT

## 2022-10-28 PROCEDURE — 36591 DRAW BLOOD OFF VENOUS DEVICE: CPT | Performed by: INTERNAL MEDICINE

## 2022-10-28 PROCEDURE — 258N000003 HC RX IP 258 OP 636: Performed by: INTERNAL MEDICINE

## 2022-10-28 PROCEDURE — G0463 HOSPITAL OUTPT CLINIC VISIT: HCPCS

## 2022-10-28 PROCEDURE — 82040 ASSAY OF SERUM ALBUMIN: CPT | Performed by: INTERNAL MEDICINE

## 2022-10-28 PROCEDURE — 86300 IMMUNOASSAY TUMOR CA 15-3: CPT | Performed by: INTERNAL MEDICINE

## 2022-10-28 PROCEDURE — 80053 COMPREHEN METABOLIC PANEL: CPT | Performed by: INTERNAL MEDICINE

## 2022-10-28 PROCEDURE — 85025 COMPLETE CBC W/AUTO DIFF WBC: CPT | Performed by: INTERNAL MEDICINE

## 2022-10-28 PROCEDURE — 99214 OFFICE O/P EST MOD 30 MIN: CPT | Performed by: PHYSICIAN ASSISTANT

## 2022-10-28 PROCEDURE — 96413 CHEMO IV INFUSION 1 HR: CPT

## 2022-10-28 PROCEDURE — 250N000011 HC RX IP 250 OP 636: Performed by: INTERNAL MEDICINE

## 2022-10-28 PROCEDURE — 82378 CARCINOEMBRYONIC ANTIGEN: CPT | Performed by: INTERNAL MEDICINE

## 2022-10-28 RX ORDER — HEPARIN SODIUM (PORCINE) LOCK FLUSH IV SOLN 100 UNIT/ML 100 UNIT/ML
5 SOLUTION INTRAVENOUS EVERY 8 HOURS
Status: DISCONTINUED | OUTPATIENT
Start: 2022-10-28 | End: 2022-10-28 | Stop reason: HOSPADM

## 2022-10-28 RX ORDER — HEPARIN SODIUM (PORCINE) LOCK FLUSH IV SOLN 100 UNIT/ML 100 UNIT/ML
500 SOLUTION INTRAVENOUS ONCE
Status: COMPLETED | OUTPATIENT
Start: 2022-10-28 | End: 2022-10-28

## 2022-10-28 RX ADMIN — Medication 5 ML: at 12:29

## 2022-10-28 RX ADMIN — TRASTUZUMAB 450 MG: 150 INJECTION, POWDER, LYOPHILIZED, FOR SOLUTION INTRAVENOUS at 11:58

## 2022-10-28 RX ADMIN — Medication 500 UNITS: at 10:39

## 2022-10-28 ASSESSMENT — PAIN SCALES - GENERAL: PAINLEVEL: NO PAIN (0)

## 2022-10-28 NOTE — NURSING NOTE
"Chief Complaint   Patient presents with     Port Draw     Labs drawn via port by RN in lab.  VS taken        Port accessed with 20 gauge 3/4\" Power needle by RN, labs collected, line flushed with saline and heparin.  Vitals taken. Pt checked in for appointment(s).    Vaishali Sharpe RN    "

## 2022-10-28 NOTE — NURSING NOTE
"Oncology Rooming Note    October 28, 2022 10:56 AM   Hoda Brush is a 67 year old female who presents for:    Chief Complaint   Patient presents with     Port Draw     Labs drawn via port by RN in lab.  VS taken      Oncology Clinic Visit     Rtn for breast cancer     Initial Vitals: /66   Pulse 83   Temp 98.1  F (36.7  C) (Oral)   Resp 16   Wt 81.5 kg (179 lb 9.6 oz)   SpO2 96%   BMI 32.07 kg/m   Estimated body mass index is 32.07 kg/m  as calculated from the following:    Height as of 9/12/22: 1.594 m (5' 2.75\").    Weight as of this encounter: 81.5 kg (179 lb 9.6 oz). Body surface area is 1.9 meters squared.  No Pain (0) Comment: Data Unavailable   No LMP recorded. Patient is postmenopausal.  Allergies reviewed: Yes  Medications reviewed: Yes    Medications: Medication refills not needed today.  Pharmacy name entered into [employer]: [id number] [place], [place] - [address]    Clinical concerns: Patient has no specific questions or clinical concerns outside of the reason for the visit.       Marilee Humphrey, EMT            "

## 2022-10-28 NOTE — PROGRESS NOTES
Hoda is a patient from [facility] and is seen here for continuation of care for her metastatic ER+HER2- breast cancer.  She is a [employer] refugee from [facility] and was initially seen in clinic with her daughter.  The only data we have from [facility] is an English translation of her records.       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  On 02/11/2014, she was seen by an oncologist.  The clinical staging of T4b N2 M1 was noted.   Her breast cancer was on the left side. There was no right breast cancer, confirmed by the patient and her daughter, correcting a possible error in the translated records.  ER was positive in 100% of the cells, WV at 14% and HER2 was 3+ positive.  It appears that this histopathologic information is on the mastectomy specimen. She underwent an MRI for staging of presumptive bone metastases which was performed 03/02/2014.  There were skeletal metastases in the thoracic vertebrae at 12, L1, L3, L5 and S1 vertebral body, ranging in size from 0.7-3.0 cm in size.  Ischial and right femur were also involved, as well as a large iliac mass measuring about 15 cm in the uterus. 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   Radiation Oncology consultation was performed 03/11/2014, and she was given radiation in 1 dose of 6 Gy to the right iliac lesion.        With the initial diagnosis, she initiated treatment with 6 cycles of CAF neoadjuvant chemotherapy 02/14, 07/07, 03/28, 04/18/14, 05/08 and 05/29/14. She also received monthly zoledronic acid.  She then underwent a modified left mastectomy of Palacios type and left lymphadenoectomy on 06/27/2014.   Pathologic examination showed number at [facility] was [id number] showed infiltrative grade 2 ductal cancer with 6 level 1 metastatic lymph nodes and 3 level 2 metastatic lymph nodes.  The differentiation was intermediate.  The tumor was ER positive 70% of the cells, WV  positive in 40% of the cells and HER2 was 3+ by immunohistochemistry and the Ki-67 labeling index was 20%. Her staging after surgery was stage IV, pT4b N2 M1.  I don't see a biopsy of the skeletal metastases.  She then had continuation with chemotherapy with 4 cycles of Herceptin and taxane with monthly zoledronic acid.  Tamoxifen was initiated.  She then had two years of Herceptin and a decision was made not to continue further Herceptin.  She continued on zoledronic acid every 3 months. She was clinically stable. [place] the [place] new [place] from [facility].  She has now been changed to letrozole.  Denosumab has now been held for new diagnosis of osteonecrosis of the R maxilla.     History of cholecystectomy 2020.    Seen in the ED on 10/4/2021, for acute abdominal pain.  Elevated LFTs, elevated lipase.  Question of stone versus pancreatitis.  She was discharged and followed up with an outpatient gastroenterology clinic.  They recommended clear liquid diet and ERCP.        TREATMENT HISTORY:  A. Initial diagnosis with metastatic breast cancer in [facility].  Neoadjuvant CAF x 6.   B. Left mastectomy. Left axillary node dissection.  C.  Radiation in 1 dose to R iliac region.  C. Herceptin for 2 years taxane for a prescribed course then stopped, monthly zoledronic acid.  She had 2 years of Herceptin with tamoxifen added after chemotherapy.  D.  Tamoxifen alone and zoledronic acid every 3 months starting 2014.  Letrozole was started   E.  [place]  We restarted Herceptin every 3 weeks and continued tamoxifen. Bone targeted agent changed to denosumab every 9 weeks. Zometa discontinued 2017.  Tamoxifen was changed to letrozole August 2019.    F.  Xgeva discontinued 12-17-19 because of dental issues.   G.  J+J vaccine March, 2021.  H.  Was on Zometa once May 11.  Reaction with eye lid swelling. Discontinued Zometal  H.  Restart Xgeva 6-22-21.  Continuing the trastuzumab and letrozole.  Both tolerated  well.       INTERVAL HISTORY   Today, she states that she continues to feel very well. No new concnerns. No chest pain, shortness of breath, cough, or swelling in her legs. No diarrhea. No new lumps or bumps. No new joint aches. No headaches or blurry vision    She continues to exercise daily, is taking her calcium and vitamin D    Energy level has been stable    Sleep continues to be on and off, takes oxybutynin to help with the bladder and the occasional trazodone to her get her to sleep    No issues with the aromatase inhibitor, including hot flashes joint aches or vaginal dryness     ROS  Patient denies the following except if noted above: fevers, body aches, chills, headaches, vision changes, dizziness, chest pain, shortness of breath, nausea, vomiting, diarrhea, constipation, abdominal pain, rashes, bruising/bleeding, mouth sores, swelling or pain in the legs.       PHYSICAL EXAM:  /66   Pulse 83   Temp 98.1  F (36.7  C) (Oral)   Resp 16   Wt 81.5 kg (179 lb 9.6 oz)   SpO2 96%   BMI 32.07 kg/m    Wt Readings from Last 4 Encounters:   10/28/22 81.5 kg (179 lb 9.6 oz)   10/07/22 82.1 kg (180 lb 14.4 oz)   09/15/22 80.2 kg (176 lb 14.4 oz)   09/12/22 81.1 kg (178 lb 12.8 oz)     General: Alert, oriented, pleasant, NAD  HEENT: Normocephalic, atraumatic, no icterus   Neck: No cervical or supraclavicular LAD.  Axillary: No LAD  Lungs: CTA bilaterally, normal work of breathing  Cardiac: RRR  Abdomen: Soft, nontender, nondistended. Normoactive bowel sounds. No hepatosplenomegaly, masses  Neuro: CNII-XII grossly intact  Extremities: No pedal edema        Labs  Most Recent 3 CBC's:  Recent Labs   Lab Test 10/28/22  1028 10/07/22  1203 09/15/22  0846   WBC 7.0 7.8 7.4   HGB 12.3 12.1 12.7   MCV 94 93 93    185 203    Most Recent 3 BMP's:  Recent Labs   Lab Test 10/07/22  1203 09/15/22  0846 08/26/22  0924    141 144   POTASSIUM 4.1 4.4 4.2   CHLORIDE 105 103 109   CO2 28 26 29   BUN 21.6 12.2 14    CR 0.72 0.85 0.78   ANIONGAP 10 12 6   TAYLER 9.5 9.7 8.8   GLC 85 92 90    Most Recent 2 LFT's:  Recent Labs   Lab Test 10/07/22  1203 09/15/22  0846   AST 39* 43*   ALT 40* 38*   ALKPHOS 51 55   BILITOTAL 0.2 0.3      I reviewed the above labs today.    ASSESSMENT AND PLAN:     1.  Hoda Brush is a 67 year old   woman with a history of ER-positive, MS-positive, HER2 positive breast cancer.  She is from [facility], formally from [facility], and came to live in the [place] with [place].  The tumor is ER positive, MS positive and HER2 positive.  She had metastatic disease at the time of presentation with bone-only metastases by report.  She underwent neoadjuvant CAF, had a left mastectomy, left axillary lymph node dissection, radiation to the right hip, which included the right iliac region where she had metastatic disease.  She was initially on tamoxifen but then was switched to letrozole.  She continues on this and every 3-week trastuzumab. She has had no evidence of disease progression for the last 3 years.  We have continued Herceptin every 3 weeks as well as daily letrozole.   -- Most recent CT CAP stable   -- Proceed with herceptin today. Continue letrozole   -- Echo every 3 months     2. Bone health  - Xgeva was on hold due to possible ONJ. See Dr. Aguiar's note from 12/16--this was resumed on 1/6/22. Due in August. To be given every 3 months--this was confirmed with Dr. Aguiar.   -Currently no dental issues, Next due in November     3. Insomnia/overactive bladder   - Improved on oxybutynin 5 mg at bedtime.     4. Vaccines  - Up to date on Flu and Covid       Patient will call in the interim with any questions or concerns. They voice understanding and agree with the above plan.     Jamee Mckeon PA-C

## 2022-10-28 NOTE — PATIENT INSTRUCTIONS
Contact Numbers  [facility]: [phone]    After Hours:  [phone]  Triage: [phone]    Please call the [facility] Triage line if you experience a temperature greater than or equal to 100.5, shaking chills, have uncontrolled nausea, vomiting and/or diarrhea, dizziness, shortness of breath, chest pain, bleeding, unexplained bruising, or if you have any other new/concerning symptoms, questions or concerns.     If it is after hours, weekends, or holidays, please call the main hospital  at  [phone] and ask to speak to the Oncology doctor on call.     If you are having any concerning symptoms or wish to speak to a provider before your next infusion visit, please call your care coordinator or triage to notify them so we can adequately serve you.     If you need a refill on a narcotic prescription or other medication, please call triage before your infusion appointment.       October 2022 Sunday Monday Tuesday Wednesday Thursday Friday Saturday                                 1       2     3     4     5     6     7    LAB CENTRAL  12:00 PM   (15 min.)   [facility] LAB DRAW   [facility]    RETURN  12:15 PM   (45 min.)   Jamee Mckeon PA-C   [facility]    ONC INFUSION 1 HR (60 MIN)   1:30 PM   (60 min.)    ONC INFUSION NURSE   [facility] 8       9     10     11     12     13     14     15       16     17     18     19     20     21     22       23     24     25     26     27     28    LAB CENTRAL  10:30 AM   (15 min.)   [facility] LAB DRAW   [facility]    RETURN  10:45 AM   (45 min.)   Jamee Mckeon PA-C   [facility]    ONC INFUSION 1 HR (60 MIN)  12:00 PM   (60 min.)   [facility] ONC INFUSION NURSE   [facility] 29 30 31 November 2022 Sunday  Monday Tuesday Wednesday Thursday Friday Saturday             1     2     3     4     5       6     7     8     9     10     11     12       13     14     15     16     17    LAB CENTRAL   7:45 AM   (15 min.)   [facility] LAB DRAW   [facility]    RETURN   7:55 AM   (30 min.)   Lisandro Aguiar MD   [facility]    ONC INFUSION 1 HR (60 MIN)   9:00 AM   (60 min.)    ONC INFUSION NURSE   [facility] 18     19       20     21     22     23     24     25     26       27     28     29     30                                      Lab Results:  Recent Results (from the past 12 hour(s))   Comprehensive metabolic panel    Collection Time: 10/28/22 10:28 AM   Result Value Ref Range    Sodium 141 136 - 145 mmol/L    Potassium 4.1 3.4 - 5.3 mmol/L    Chloride 105 98 - 107 mmol/L    Carbon Dioxide (CO2) 28 22 - 29 mmol/L    Anion Gap 8 7 - 15 mmol/L    Urea Nitrogen 16.7 8.0 - 23.0 mg/dL    Creatinine 0.80 0.51 - 0.95 mg/dL    Calcium 9.7 8.8 - 10.2 mg/dL    Glucose 118 (H) 70 - 99 mg/dL    Alkaline Phosphatase 50 35 - 104 U/L    AST 40 (H) 10 - 35 U/L    ALT 38 (H) 10 - 35 U/L    Protein Total 7.5 6.4 - 8.3 g/dL    Albumin 4.0 3.5 - 5.2 g/dL    Bilirubin Total 0.4 <=1.2 mg/dL    GFR Estimate 81 >60 mL/min/1.73m2   CBC with platelets and differential    Collection Time: 10/28/22 10:28 AM   Result Value Ref Range    WBC Count 7.0 4.0 - 11.0 10e3/uL    RBC Count 4.20 3.80 - 5.20 10e6/uL    Hemoglobin 12.3 11.7 - 15.7 g/dL    Hematocrit 39.4 35.0 - 47.0 %    MCV 94 78 - 100 fL    MCH 29.3 26.5 - 33.0 pg    MCHC 31.2 (L) 31.5 - 36.5 g/dL    RDW 13.7 10.0 - 15.0 %    Platelet Count 184 150 - 450 10e3/uL    % Neutrophils 54 %    % Lymphocytes 36 %    % Monocytes 7 %    % Eosinophils 3 %    % Basophils 0 %    % Immature Granulocytes 0 %    NRBCs per 100 WBC 0 <1 /100    Absolute Neutrophils 3.7 1.6 - 8.3 10e3/uL    Absolute Lymphocytes 2.5 0.8  - 5.3 10e3/uL    Absolute Monocytes 0.5 0.0 - 1.3 10e3/uL    Absolute Eosinophils 0.2 0.0 - 0.7 10e3/uL    Absolute Basophils 0.0 0.0 - 0.2 10e3/uL    Absolute Immature Granulocytes 0.0 <=0.4 10e3/uL    Absolute NRBCs 0.0 10e3/uL

## 2022-10-28 NOTE — PROGRESS NOTES
Infusion Nursing Note:  Hoda Brush presents today for C88D1 Herceptin.    Patient seen by provider today: Yes: Jamee CHA   present during visit today: Not Applicable.    Note: No complaints made.    Intravenous Access:  Implanted Port.    Treatment Conditions:  Lab Results   Component Value Date    HGB 12.3 10/28/2022    WBC 7.0 10/28/2022    ANEU 3.1 06/22/2021    ANEUTAUTO 3.7 10/28/2022     10/28/2022      Lab Results   Component Value Date     10/28/2022    POTASSIUM 4.1 10/28/2022    CR 0.80 10/28/2022    TAYLER 9.7 10/28/2022    BILITOTAL 0.4 10/28/2022    ALBUMIN 4.0 10/28/2022    ALT 38 (H) 10/28/2022    AST 40 (H) 10/28/2022     Results reviewed, labs MET treatment parameters, ok to proceed with treatment.  ECHO/MUGA completed 9/15/22  EF 55-60%.    Post Infusion Assessment:  Patient tolerated infusion without incident.  Blood return noted pre and post infusion.  Site patent and intact, free from redness, edema or discomfort.  No evidence of extravasations.  Access discontinued per protocol.     Discharge Plan:   Patient declined prescription refills.  Discharge instructions reviewed with: Patient.  Patient and/or family verbalized understanding of discharge instructions and all questions answered.  AVS to patient via [employer].  Patient will return 11/7/22 for next appointment.   Patient discharged in stable condition accompanied by: self.  Departure Mode: Ambulatory.      EMILY WICK RN

## 2022-10-29 LAB — CEA SERPL-MCNC: 1.1 NG/ML

## 2022-10-31 LAB — CANCER AG27-29 SERPL-ACNC: 10 U/ML

## 2022-11-11 DIAGNOSIS — G47.00 INSOMNIA, UNSPECIFIED TYPE: ICD-10-CM

## 2022-11-14 RX ORDER — TRAZODONE HYDROCHLORIDE 50 MG/1
TABLET, FILM COATED ORAL
Qty: 45 TABLET | Refills: 2 | Status: SHIPPED | OUTPATIENT
Start: 2022-11-14 | End: 2023-11-09

## 2022-11-14 NOTE — PROGRESS NOTES
Hoda is a 67 year old who is being evaluated via a billable video visit.       Video duration: 6 minutes   Patient Location: work   Provider location: Off site     How would you like to obtain your AVS? MyChart  If the video visit is dropped, the invitation should be resent by: Text to cell phone: [phone]  Will anyone else be joining your video visit? Blanca Cooper      Hoda is a patient from [facility] and is seen here for continuation of care for her metastatic ER+HER2- breast cancer.  She is a [employer] refugee from [facility] and was initially seen in clinic with her daughter.  The only data we have from [facility] is an English translation of her records.       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  On 02/11/2014, she was seen by an oncologist.  The clinical staging of T4b N2 M1 was noted.   Her breast cancer was on the left side. There was no right breast cancer, confirmed by the patient and her daughter, correcting a possible error in the translated records.  ER was positive in 100% of the cells, MA at 14% and HER2 was 3+ positive.  It appears that this histopathologic information is on the mastectomy specimen. She underwent an MRI for staging of presumptive bone metastases which was performed 03/02/2014.  There were skeletal metastases in the thoracic vertebrae at 12, L1, L3, L5 and S1 vertebral body, ranging in size from 0.7-3.0 cm in size.  Ischial and right femur were also involved, as well as a large iliac mass measuring about 15 cm in the uterus. 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   Radiation Oncology consultation was performed 03/11/2014, and she was given radiation in 1 dose of 6 Gy to the right iliac lesion.        With the initial diagnosis, she initiated treatment with 6 cycles of CAF neoadjuvant chemotherapy 02/14, 07/07, 03/28, 04/18/14, 05/08 and 05/29/14. She also received monthly zoledronic  acid.  She then underwent a modified left mastectomy of Palacios type and left lymphadenoectomy on 06/27/2014.   Pathologic examination showed number at [facility] was [id number] showed infiltrative grade 2 ductal cancer with 6 level 1 metastatic lymph nodes and 3 level 2 metastatic lymph nodes.  The differentiation was intermediate.  The tumor was ER positive 70% of the cells, WA positive in 40% of the cells and HER2 was 3+ by immunohistochemistry and the Ki-67 labeling index was 20%. Her staging after surgery was stage IV, pT4b N2 M1.  I don't see a biopsy of the skeletal metastases.  She then had continuation with chemotherapy with 4 cycles of Herceptin and taxane with monthly zoledronic acid.  Tamoxifen was initiated.  She then had two years of Herceptin and a decision was made not to continue further Herceptin.  She continued on zoledronic acid every 3 months. She was clinically stable. She then moved [place] the [place] new refugee from [facility].  She has now been changed to letrozole.  Denosumab has now been held for new diagnosis of osteonecrosis of the R maxilla.     History of cholecystectomy 2020.    Seen in the ED on 10/4/2021, for acute abdominal pain.  Elevated LFTs, elevated lipase.  Question of stone versus pancreatitis.  She was discharged and followed up with an outpatient gastroenterology clinic.  They recommended clear liquid diet and ERCP.        TREATMENT HISTORY:  A. Initial diagnosis with metastatic breast cancer in [facility].  Neoadjuvant CAF x 6.   B. Left mastectomy. Left axillary node dissection.  C.  Radiation in 1 dose to R iliac region.  C. Herceptin for 2 years taxane for a prescribed course then stopped, monthly zoledronic acid.  She had 2 years of Herceptin with tamoxifen added after chemotherapy.  D.  Tamoxifen alone and zoledronic acid every 3 months starting 2014.  Letrozole was started   E.  [place]  We restarted Herceptin every 3 weeks and continued tamoxifen. Bone  targeted agent changed to denosumab every 9 weeks. Zometa discontinued 2017.  Tamoxifen was changed to letrozole August 2019.    F.  Xgeva discontinued 12-17-19 because of dental issues.   G.  J+J vaccine March, 2021.  REJI.  Was on Zometa once May 11.  Reaction with eye lid swelling. Discontinued Zometa  H.  Restart Xgeva 6-22-21.  Continuing the trastuzumab and letrozole.  Both tolerated well.       INTERVAL HISTORY   Feels very well today. No new concerns. Denies chest pain, shortness of breath, or lower extremity swelling. No infectious symptoms. No new aches or pains. Energy level is stable. No new cough, no belly pain. Bowels moving well    Denies any dental or jaw pain.     ROS  Patient denies the following except if noted above: fevers, body aches, chills, headaches, vision changes, dizziness, chest pain, shortness of breath, nausea, vomiting, diarrhea, constipation, abdominal pain, rashes, bruising/bleeding, mouth sores, swelling or pain in the legs.       PHYSICAL EXAM:  General: Resting comfortably in no acute distress  Head: Normocephalic, atraumatic   EENT: No scleral icteris, EOMS intact.   Lung: Normal respiratory effort. Speaking fluently without shortness of breath. No audible wheezes or coughing.   Neuro: AAO x3. Moving upper extremities equally and symmetrically   Skin: Warm, dry, intact. No rashes, no suspicious lesions. No petechia or bruising noted on visible skin     The rest of a comprehensive physical examination is deferred due to PHE (public health emergency) video restrictions        Labs  Most Recent 3 CBC's:  Recent Labs   Lab Test 10/28/22  1028 10/07/22  1203 09/15/22  0846   WBC 7.0 7.8 7.4   HGB 12.3 12.1 12.7   MCV 94 93 93    185 203    Most Recent 3 BMP's:  Recent Labs   Lab Test 10/28/22  1028 10/07/22  1203 09/15/22  0846    143 141   POTASSIUM 4.1 4.1 4.4   CHLORIDE 105 105 103   CO2 28 28 26   BUN 16.7 21.6 12.2   CR 0.80 0.72 0.85   ANIONGAP 8 10 12   TAYLER 9.7 9.5  9.7   * 85 92    Most Recent 2 LFT's:  Recent Labs   Lab Test 10/28/22  1028 10/07/22  1203   AST 40* 39*   ALT 38* 40*   ALKPHOS 50 51   BILITOTAL 0.4 0.2      I reviewed the above labs today.  Repeat labs Thursday prior to infusion. Tumor markers stable     ASSESSMENT AND PLAN:     1.  Hoda Brush is a 67 year old   woman with a history of ER-positive, TN-positive, HER2 positive breast cancer.  She is from [facility], formally from [facility], and came to live in the [place] with [place].  The tumor is ER positive, TN positive and HER2 positive.  She had metastatic disease at the time of presentation with bone-only metastases by report.  She underwent neoadjuvant CAF, had a left mastectomy, left axillary lymph node dissection, radiation to the right hip, which included the right iliac region where she had metastatic disease.  She was initially on tamoxifen but then was switched to letrozole.  She continues on this and every 3-week trastuzumab. She has had no evidence of disease progression for the last 3 years.  We have continued Herceptin every 3 weeks as well as daily letrozole.   -- Most recent CT CAP stable, repeat CT in January as scheduled. Tumor markers stable   -- Proceed with herceptin on Thursday. Continue letrozole   -- Echo every 3 months-- ordered   -- Provider visit every 6 weeks     2. Bone health  - Xgeva was on hold due to possible ONJ. See Dr. Aguiar's note from 12/16--this was resumed on 1/6/22. To be given every 3 months--this was preioulsy confirmed with Dr. Aguiar.   -Currently no dental issues, Xgeva due on Thursday    3. Insomnia/overactive bladder   - Improved on oxybutynin 5 mg at bedtime.     4. Vaccines  - Up to date on Flu and Covid       Patient will call in the interim with any questions or concerns. They voice understanding and agree with the above plan.     Jamee Mckeon PA-C

## 2022-11-14 NOTE — TELEPHONE ENCOUNTER
Prescription approved per [facility] Refill Protocol.  Tasneem MTZ RN  [facility]

## 2022-11-15 ENCOUNTER — VIRTUAL VISIT (OUTPATIENT)
Dept: ONCOLOGY | Facility: CLINIC | Age: 67
End: 2022-11-15
Attending: PHYSICIAN ASSISTANT
Payer: COMMERCIAL

## 2022-11-15 DIAGNOSIS — C50.919 METASTATIC BREAST CANCER: Primary | ICD-10-CM

## 2022-11-15 DIAGNOSIS — C79.51 BONE METASTASIS: ICD-10-CM

## 2022-11-15 PROCEDURE — 99213 OFFICE O/P EST LOW 20 MIN: CPT | Mod: 95 | Performed by: PHYSICIAN ASSISTANT

## 2022-11-15 RX ORDER — EPINEPHRINE 1 MG/ML
0.3 INJECTION, SOLUTION INTRAMUSCULAR; SUBCUTANEOUS EVERY 5 MIN PRN
Status: CANCELLED | OUTPATIENT
Start: 2022-11-18

## 2022-11-15 RX ORDER — ACETAMINOPHEN 325 MG/1
650 TABLET ORAL
Status: CANCELLED | OUTPATIENT
Start: 2022-11-18

## 2022-11-15 RX ORDER — ALBUTEROL SULFATE 90 UG/1
1-2 AEROSOL, METERED RESPIRATORY (INHALATION)
Status: CANCELLED
Start: 2022-12-09

## 2022-11-15 RX ORDER — HEPARIN SODIUM (PORCINE) LOCK FLUSH IV SOLN 100 UNIT/ML 100 UNIT/ML
5 SOLUTION INTRAVENOUS EVERY 8 HOURS
Status: CANCELLED | OUTPATIENT
Start: 2022-12-09

## 2022-11-15 RX ORDER — ALBUTEROL SULFATE 0.83 MG/ML
2.5 SOLUTION RESPIRATORY (INHALATION)
Status: CANCELLED | OUTPATIENT
Start: 2022-11-18

## 2022-11-15 RX ORDER — LORAZEPAM 2 MG/ML
0.5 INJECTION INTRAMUSCULAR EVERY 4 HOURS PRN
Status: CANCELLED
Start: 2022-11-18

## 2022-11-15 RX ORDER — SODIUM CHLORIDE 9 MG/ML
1000 INJECTION, SOLUTION INTRAVENOUS CONTINUOUS PRN
Status: CANCELLED
Start: 2022-12-09

## 2022-11-15 RX ORDER — DIPHENHYDRAMINE HYDROCHLORIDE 50 MG/ML
50 INJECTION INTRAMUSCULAR; INTRAVENOUS
Status: CANCELLED
Start: 2022-12-09

## 2022-11-15 RX ORDER — SODIUM CHLORIDE 9 MG/ML
1000 INJECTION, SOLUTION INTRAVENOUS CONTINUOUS PRN
Status: CANCELLED
Start: 2022-11-18

## 2022-11-15 RX ORDER — HEPARIN SODIUM (PORCINE) LOCK FLUSH IV SOLN 100 UNIT/ML 100 UNIT/ML
5 SOLUTION INTRAVENOUS EVERY 8 HOURS
Status: CANCELLED | OUTPATIENT
Start: 2022-11-18

## 2022-11-15 RX ORDER — METHYLPREDNISOLONE SODIUM SUCCINATE 125 MG/2ML
125 INJECTION, POWDER, LYOPHILIZED, FOR SOLUTION INTRAMUSCULAR; INTRAVENOUS
Status: CANCELLED
Start: 2022-11-18

## 2022-11-15 RX ORDER — DIPHENHYDRAMINE HYDROCHLORIDE 50 MG/ML
50 INJECTION INTRAMUSCULAR; INTRAVENOUS
Status: CANCELLED
Start: 2022-11-18

## 2022-11-15 RX ORDER — EPINEPHRINE 0.3 MG/.3ML
0.3 INJECTION SUBCUTANEOUS EVERY 5 MIN PRN
Status: CANCELLED | OUTPATIENT
Start: 2022-11-18

## 2022-11-15 RX ORDER — METHYLPREDNISOLONE SODIUM SUCCINATE 125 MG/2ML
125 INJECTION, POWDER, LYOPHILIZED, FOR SOLUTION INTRAMUSCULAR; INTRAVENOUS
Status: CANCELLED
Start: 2022-12-09

## 2022-11-15 RX ORDER — ALBUTEROL SULFATE 90 UG/1
1-2 AEROSOL, METERED RESPIRATORY (INHALATION)
Status: CANCELLED
Start: 2022-11-18

## 2022-11-15 RX ORDER — DIPHENHYDRAMINE HCL 25 MG
50 CAPSULE ORAL ONCE
Status: CANCELLED
Start: 2022-12-09

## 2022-11-15 RX ORDER — LORAZEPAM 2 MG/ML
0.5 INJECTION INTRAMUSCULAR EVERY 4 HOURS PRN
Status: CANCELLED
Start: 2022-12-09

## 2022-11-15 RX ORDER — DIPHENHYDRAMINE HCL 25 MG
50 CAPSULE ORAL ONCE
Status: CANCELLED
Start: 2022-11-18

## 2022-11-15 RX ORDER — ALBUTEROL SULFATE 0.83 MG/ML
2.5 SOLUTION RESPIRATORY (INHALATION)
Status: CANCELLED | OUTPATIENT
Start: 2022-12-09

## 2022-11-15 RX ORDER — EPINEPHRINE 1 MG/ML
0.3 INJECTION, SOLUTION INTRAMUSCULAR; SUBCUTANEOUS EVERY 5 MIN PRN
Status: CANCELLED | OUTPATIENT
Start: 2022-12-09

## 2022-11-15 RX ORDER — ACETAMINOPHEN 325 MG/1
650 TABLET ORAL
Status: CANCELLED | OUTPATIENT
Start: 2022-12-09

## 2022-11-15 RX ORDER — EPINEPHRINE 0.3 MG/.3ML
0.3 INJECTION SUBCUTANEOUS EVERY 5 MIN PRN
Status: CANCELLED | OUTPATIENT
Start: 2022-12-09

## 2022-11-15 NOTE — PROGRESS NOTES
Hoda is a 67 year old who is being evaluated via a billable video visit.      How would you like to obtain your AVS? MyChart  If the video visit is dropped, the invitation should be resent by: Text to cell phone: [phone]  Will anyone else be joining your video visit? No  {If patient encounters technical issues they should call [phone] :[id number]}      Kerwin Cooper

## 2022-11-17 ENCOUNTER — INFUSION THERAPY VISIT (OUTPATIENT)
Dept: ONCOLOGY | Facility: CLINIC | Age: 67
End: 2022-11-17
Attending: INTERNAL MEDICINE
Payer: COMMERCIAL

## 2022-11-17 ENCOUNTER — APPOINTMENT (OUTPATIENT)
Dept: LAB | Facility: CLINIC | Age: 67
End: 2022-11-17
Attending: PHYSICIAN ASSISTANT
Payer: COMMERCIAL

## 2022-11-17 VITALS
RESPIRATION RATE: 16 BRPM | SYSTOLIC BLOOD PRESSURE: 110 MMHG | WEIGHT: 180.7 LBS | OXYGEN SATURATION: 98 % | TEMPERATURE: 97.9 F | DIASTOLIC BLOOD PRESSURE: 77 MMHG | HEART RATE: 76 BPM | BODY MASS INDEX: 32.27 KG/M2

## 2022-11-17 DIAGNOSIS — C79.51 BONE METASTASIS: ICD-10-CM

## 2022-11-17 DIAGNOSIS — C50.912 MALIGNANT NEOPLASM OF LEFT FEMALE BREAST, UNSPECIFIED ESTROGEN RECEPTOR STATUS, UNSPECIFIED SITE OF BREAST (H): Primary | ICD-10-CM

## 2022-11-17 LAB
ALBUMIN SERPL BCG-MCNC: 4 G/DL (ref 3.5–5.2)
ALP SERPL-CCNC: 53 U/L (ref 35–104)
ALT SERPL W P-5'-P-CCNC: 33 U/L (ref 10–35)
ANION GAP SERPL CALCULATED.3IONS-SCNC: 10 MMOL/L (ref 7–15)
AST SERPL W P-5'-P-CCNC: 35 U/L (ref 10–35)
BASOPHILS # BLD AUTO: 0 10E3/UL (ref 0–0.2)
BASOPHILS NFR BLD AUTO: 0 %
BILIRUB SERPL-MCNC: 0.4 MG/DL
BUN SERPL-MCNC: 15.7 MG/DL (ref 8–23)
CALCIUM SERPL-MCNC: 8.9 MG/DL (ref 8.8–10.2)
CEA SERPL-MCNC: 0.9 NG/ML
CHLORIDE SERPL-SCNC: 105 MMOL/L (ref 98–107)
CREAT SERPL-MCNC: 0.81 MG/DL (ref 0.51–0.95)
DEPRECATED HCO3 PLAS-SCNC: 29 MMOL/L (ref 22–29)
EOSINOPHIL # BLD AUTO: 0.3 10E3/UL (ref 0–0.7)
EOSINOPHIL NFR BLD AUTO: 4 %
ERYTHROCYTE [DISTWIDTH] IN BLOOD BY AUTOMATED COUNT: 13.5 % (ref 10–15)
GFR SERPL CREATININE-BSD FRML MDRD: 80 ML/MIN/1.73M2
GLUCOSE SERPL-MCNC: 98 MG/DL (ref 70–99)
HCT VFR BLD AUTO: 37.9 % (ref 35–47)
HGB BLD-MCNC: 12 G/DL (ref 11.7–15.7)
IMM GRANULOCYTES # BLD: 0 10E3/UL
IMM GRANULOCYTES NFR BLD: 0 %
LYMPHOCYTES # BLD AUTO: 2.6 10E3/UL (ref 0.8–5.3)
LYMPHOCYTES NFR BLD AUTO: 37 %
MCH RBC QN AUTO: 29.7 PG (ref 26.5–33)
MCHC RBC AUTO-ENTMCNC: 31.7 G/DL (ref 31.5–36.5)
MCV RBC AUTO: 94 FL (ref 78–100)
MONOCYTES # BLD AUTO: 0.4 10E3/UL (ref 0–1.3)
MONOCYTES NFR BLD AUTO: 6 %
NEUTROPHILS # BLD AUTO: 3.6 10E3/UL (ref 1.6–8.3)
NEUTROPHILS NFR BLD AUTO: 53 %
NRBC # BLD AUTO: 0 10E3/UL
NRBC BLD AUTO-RTO: 0 /100
PLATELET # BLD AUTO: 188 10E3/UL (ref 150–450)
POTASSIUM SERPL-SCNC: 4 MMOL/L (ref 3.4–5.3)
PROT SERPL-MCNC: 7.4 G/DL (ref 6.4–8.3)
RBC # BLD AUTO: 4.04 10E6/UL (ref 3.8–5.2)
SODIUM SERPL-SCNC: 144 MMOL/L (ref 136–145)
WBC # BLD AUTO: 6.9 10E3/UL (ref 4–11)

## 2022-11-17 PROCEDURE — 96372 THER/PROPH/DIAG INJ SC/IM: CPT | Performed by: INTERNAL MEDICINE

## 2022-11-17 PROCEDURE — 250N000011 HC RX IP 250 OP 636: Performed by: PHYSICIAN ASSISTANT

## 2022-11-17 PROCEDURE — 36591 DRAW BLOOD OFF VENOUS DEVICE: CPT | Performed by: PHYSICIAN ASSISTANT

## 2022-11-17 PROCEDURE — 96413 CHEMO IV INFUSION 1 HR: CPT

## 2022-11-17 PROCEDURE — 82378 CARCINOEMBRYONIC ANTIGEN: CPT | Performed by: PHYSICIAN ASSISTANT

## 2022-11-17 PROCEDURE — 86300 IMMUNOASSAY TUMOR CA 15-3: CPT | Performed by: PHYSICIAN ASSISTANT

## 2022-11-17 PROCEDURE — 258N000003 HC RX IP 258 OP 636: Performed by: PHYSICIAN ASSISTANT

## 2022-11-17 PROCEDURE — 250N000011 HC RX IP 250 OP 636: Performed by: INTERNAL MEDICINE

## 2022-11-17 PROCEDURE — 85025 COMPLETE CBC W/AUTO DIFF WBC: CPT | Performed by: PHYSICIAN ASSISTANT

## 2022-11-17 PROCEDURE — 82040 ASSAY OF SERUM ALBUMIN: CPT | Performed by: PHYSICIAN ASSISTANT

## 2022-11-17 PROCEDURE — 80053 COMPREHEN METABOLIC PANEL: CPT | Performed by: PHYSICIAN ASSISTANT

## 2022-11-17 PROCEDURE — 96372 THER/PROPH/DIAG INJ SC/IM: CPT | Mod: 59 | Performed by: INTERNAL MEDICINE

## 2022-11-17 RX ORDER — HEPARIN SODIUM (PORCINE) LOCK FLUSH IV SOLN 100 UNIT/ML 100 UNIT/ML
5 SOLUTION INTRAVENOUS EVERY 8 HOURS
Status: DISCONTINUED | OUTPATIENT
Start: 2022-11-17 | End: 2022-11-17 | Stop reason: HOSPADM

## 2022-11-17 RX ORDER — HEPARIN SODIUM (PORCINE) LOCK FLUSH IV SOLN 100 UNIT/ML 100 UNIT/ML
5 SOLUTION INTRAVENOUS ONCE
Status: COMPLETED | OUTPATIENT
Start: 2022-11-17 | End: 2022-11-17

## 2022-11-17 RX ADMIN — DENOSUMAB 120 MG: 120 INJECTION SUBCUTANEOUS at 10:28

## 2022-11-17 RX ADMIN — TRASTUZUMAB 450 MG: 150 INJECTION, POWDER, LYOPHILIZED, FOR SOLUTION INTRAVENOUS at 10:25

## 2022-11-17 RX ADMIN — Medication 5 ML: at 10:55

## 2022-11-17 RX ADMIN — Medication 5 ML: at 08:52

## 2022-11-17 ASSESSMENT — PAIN SCALES - GENERAL: PAINLEVEL: NO PAIN (0)

## 2022-11-17 NOTE — NURSING NOTE
Chief Complaint   Patient presents with     Port Draw     Port accessed with 20g flat needle by RN, labs collected, line flushed with saline and heparin.  Vitals taken. Pt checked in for appointment(s).      Jeanna SHERIFF RN PHN BSN  BMT/Oncology Lab

## 2022-11-17 NOTE — PATIENT INSTRUCTIONS
Contact Numbers    [facility] [place], [place] Main Line/TRIAGE: [phone]    Call with chills and/or temperature greater than or equal to 100.5, uncontrolled nausea/vomiting, diarrhea, constipation, dizziness, shortness of breath, chest pain, bleeding, unexplained bruising, or any new/concerning symptoms, questions/concerns.     If you are having any concerning symptoms or wish to speak to a provider before your next infusion visit, please call your care coordinator or triage to notify them so we can adequately serve you.       After Hours: [phone]    If after hours, weekends, or holidays, call main hospital  and ask for Oncology doctor on call.      November 2022 Sunday Monday Tuesday Wednesday Thursday Friday Saturday             1     2     3     4     5       6     7     8     9     10     11     12       13     14     15    VIDEO VISIT RETURN   9:45 AM   (45 min.)   Jamee Mckeon PA-C   [facility] 16     17    LAB CENTRAL   8:30 AM   (15 min.)    [facility] DRAW   [facility]    ONC INFUSION 1 HR (60 MIN)   9:00 AM   (60 min.)    ONC INFUSION NURSE   [facility] 18     19       20     21     22     23     24     25     26       27     28     29     30                                  December 2022 Sunday Monday Tuesday Wednesday Thursday Friday Saturday                       1     2     3       4     5     6     7    LAB CENTRAL   9:00 AM   (15 min.)   [facility] D[facility]   [facility]    ONC INFUSION 1 HR (60 MIN)  10:00 AM   (60 min.)    ONC INFUSION NURSE   [facility] 8     9     10       11     12     13     14     15     16     17       18     19     20     21     22     23     24       25     26     27     28    LAB CENTRAL   9:00 AM   (15 min.)    [facility] DRAW   [facility]    RETURN   9:15 AM   (45 min.)    Jamee Mckeon PA-C   [facility]    ONC INFUSION 1 HR (60 MIN)  10:30 AM   (60 min.)    ONC INFUSION NURSE   [facility] 29     30     31                       Lab Results:  Recent Results (from the past 12 hour(s))   Comprehensive metabolic panel    Collection Time: 11/17/22  8:58 AM   Result Value Ref Range    Sodium 144 136 - 145 mmol/L    Potassium 4.0 3.4 - 5.3 mmol/L    Chloride 105 98 - 107 mmol/L    Carbon Dioxide (CO2) 29 22 - 29 mmol/L    Anion Gap 10 7 - 15 mmol/L    Urea Nitrogen 15.7 8.0 - 23.0 mg/dL    Creatinine 0.81 0.51 - 0.95 mg/dL    Calcium 8.9 8.8 - 10.2 mg/dL    Glucose 98 70 - 99 mg/dL    Alkaline Phosphatase 53 35 - 104 U/L    AST 35 10 - 35 U/L    ALT 33 10 - 35 U/L    Protein Total 7.4 6.4 - 8.3 g/dL    Albumin 4.0 3.5 - 5.2 g/dL    Bilirubin Total 0.4 <=1.2 mg/dL    GFR Estimate 80 >60 mL/min/1.73m2   CBC with platelets and differential    Collection Time: 11/17/22  8:58 AM   Result Value Ref Range    WBC Count 6.9 4.0 - 11.0 10e3/uL    RBC Count 4.04 3.80 - 5.20 10e6/uL    Hemoglobin 12.0 11.7 - 15.7 g/dL    Hematocrit 37.9 35.0 - 47.0 %    MCV 94 78 - 100 fL    MCH 29.7 26.5 - 33.0 pg    MCHC 31.7 31.5 - 36.5 g/dL    RDW 13.5 10.0 - 15.0 %    Platelet Count 188 150 - 450 10e3/uL    % Neutrophils 53 %    % Lymphocytes 37 %    % Monocytes 6 %    % Eosinophils 4 %    % Basophils 0 %    % Immature Granulocytes 0 %    NRBCs per 100 WBC 0 <1 /100    Absolute Neutrophils 3.6 1.6 - 8.3 10e3/uL    Absolute Lymphocytes 2.6 0.8 - 5.3 10e3/uL    Absolute Monocytes 0.4 0.0 - 1.3 10e3/uL    Absolute Eosinophils 0.3 0.0 - 0.7 10e3/uL    Absolute Basophils 0.0 0.0 - 0.2 10e3/uL    Absolute Immature Granulocytes 0.0 <=0.4 10e3/uL    Absolute NRBCs 0.0 10e3/uL

## 2022-11-19 LAB — CANCER AG27-29 SERPL-ACNC: <9 U/ML

## 2022-12-07 ENCOUNTER — APPOINTMENT (OUTPATIENT)
Dept: LAB | Facility: CLINIC | Age: 67
End: 2022-12-07
Attending: PHYSICIAN ASSISTANT
Payer: COMMERCIAL

## 2022-12-07 ENCOUNTER — INFUSION THERAPY VISIT (OUTPATIENT)
Dept: ONCOLOGY | Facility: CLINIC | Age: 67
End: 2022-12-07
Attending: PHYSICIAN ASSISTANT
Payer: COMMERCIAL

## 2022-12-07 VITALS
OXYGEN SATURATION: 97 % | TEMPERATURE: 97.8 F | SYSTOLIC BLOOD PRESSURE: 119 MMHG | HEART RATE: 77 BPM | RESPIRATION RATE: 16 BRPM | WEIGHT: 178.3 LBS | DIASTOLIC BLOOD PRESSURE: 78 MMHG | BODY MASS INDEX: 31.84 KG/M2

## 2022-12-07 DIAGNOSIS — C50.912 MALIGNANT NEOPLASM OF LEFT FEMALE BREAST, UNSPECIFIED ESTROGEN RECEPTOR STATUS, UNSPECIFIED SITE OF BREAST (H): Primary | ICD-10-CM

## 2022-12-07 LAB
ALBUMIN SERPL BCG-MCNC: 4.1 G/DL (ref 3.5–5.2)
ALP SERPL-CCNC: 52 U/L (ref 35–104)
ALT SERPL W P-5'-P-CCNC: 33 U/L (ref 10–35)
ANION GAP SERPL CALCULATED.3IONS-SCNC: 8 MMOL/L (ref 7–15)
AST SERPL W P-5'-P-CCNC: 30 U/L (ref 10–35)
BASOPHILS # BLD AUTO: 0 10E3/UL (ref 0–0.2)
BASOPHILS NFR BLD AUTO: 0 %
BILIRUB SERPL-MCNC: 0.3 MG/DL
BUN SERPL-MCNC: 12.9 MG/DL (ref 8–23)
CALCIUM SERPL-MCNC: 9.2 MG/DL (ref 8.8–10.2)
CEA SERPL-MCNC: 0.9 NG/ML
CHLORIDE SERPL-SCNC: 106 MMOL/L (ref 98–107)
CREAT SERPL-MCNC: 0.8 MG/DL (ref 0.51–0.95)
DEPRECATED HCO3 PLAS-SCNC: 28 MMOL/L (ref 22–29)
EOSINOPHIL # BLD AUTO: 0.3 10E3/UL (ref 0–0.7)
EOSINOPHIL NFR BLD AUTO: 4 %
ERYTHROCYTE [DISTWIDTH] IN BLOOD BY AUTOMATED COUNT: 13.4 % (ref 10–15)
GFR SERPL CREATININE-BSD FRML MDRD: 81 ML/MIN/1.73M2
GLUCOSE SERPL-MCNC: 92 MG/DL (ref 70–99)
HCT VFR BLD AUTO: 39.8 % (ref 35–47)
HGB BLD-MCNC: 12.5 G/DL (ref 11.7–15.7)
IMM GRANULOCYTES # BLD: 0 10E3/UL
IMM GRANULOCYTES NFR BLD: 0 %
LYMPHOCYTES # BLD AUTO: 2.9 10E3/UL (ref 0.8–5.3)
LYMPHOCYTES NFR BLD AUTO: 40 %
MCH RBC QN AUTO: 29.6 PG (ref 26.5–33)
MCHC RBC AUTO-ENTMCNC: 31.4 G/DL (ref 31.5–36.5)
MCV RBC AUTO: 94 FL (ref 78–100)
MONOCYTES # BLD AUTO: 0.5 10E3/UL (ref 0–1.3)
MONOCYTES NFR BLD AUTO: 7 %
NEUTROPHILS # BLD AUTO: 3.5 10E3/UL (ref 1.6–8.3)
NEUTROPHILS NFR BLD AUTO: 49 %
NRBC # BLD AUTO: 0 10E3/UL
NRBC BLD AUTO-RTO: 0 /100
PLATELET # BLD AUTO: 226 10E3/UL (ref 150–450)
POTASSIUM SERPL-SCNC: 4.5 MMOL/L (ref 3.4–5.3)
PROT SERPL-MCNC: 7.7 G/DL (ref 6.4–8.3)
RBC # BLD AUTO: 4.23 10E6/UL (ref 3.8–5.2)
SODIUM SERPL-SCNC: 142 MMOL/L (ref 136–145)
WBC # BLD AUTO: 7.2 10E3/UL (ref 4–11)

## 2022-12-07 PROCEDURE — 86300 IMMUNOASSAY TUMOR CA 15-3: CPT | Performed by: PHYSICIAN ASSISTANT

## 2022-12-07 PROCEDURE — 250N000011 HC RX IP 250 OP 636: Performed by: PHYSICIAN ASSISTANT

## 2022-12-07 PROCEDURE — 96413 CHEMO IV INFUSION 1 HR: CPT

## 2022-12-07 PROCEDURE — 80053 COMPREHEN METABOLIC PANEL: CPT | Performed by: PHYSICIAN ASSISTANT

## 2022-12-07 PROCEDURE — 82378 CARCINOEMBRYONIC ANTIGEN: CPT | Performed by: PHYSICIAN ASSISTANT

## 2022-12-07 PROCEDURE — 36591 DRAW BLOOD OFF VENOUS DEVICE: CPT | Performed by: PHYSICIAN ASSISTANT

## 2022-12-07 PROCEDURE — 258N000003 HC RX IP 258 OP 636: Performed by: PHYSICIAN ASSISTANT

## 2022-12-07 PROCEDURE — 85025 COMPLETE CBC W/AUTO DIFF WBC: CPT | Performed by: PHYSICIAN ASSISTANT

## 2022-12-07 RX ORDER — HEPARIN SODIUM (PORCINE) LOCK FLUSH IV SOLN 100 UNIT/ML 100 UNIT/ML
5 SOLUTION INTRAVENOUS EVERY 8 HOURS
Status: DISCONTINUED | OUTPATIENT
Start: 2022-12-07 | End: 2022-12-07 | Stop reason: HOSPADM

## 2022-12-07 RX ORDER — HEPARIN SODIUM (PORCINE) LOCK FLUSH IV SOLN 100 UNIT/ML 100 UNIT/ML
5 SOLUTION INTRAVENOUS ONCE
Status: COMPLETED | OUTPATIENT
Start: 2022-12-07 | End: 2022-12-07

## 2022-12-07 RX ADMIN — Medication 5 ML: at 09:15

## 2022-12-07 RX ADMIN — TRASTUZUMAB 450 MG: 150 INJECTION, POWDER, LYOPHILIZED, FOR SOLUTION INTRAVENOUS at 10:29

## 2022-12-07 RX ADMIN — Medication 5 ML: at 11:04

## 2022-12-07 ASSESSMENT — PAIN SCALES - GENERAL: PAINLEVEL: NO PAIN (0)

## 2022-12-07 NOTE — PROGRESS NOTES
Infusion Nursing Note:  Hoda Brush presents today for cycle 90 day 1 herceptin.    Patient seen by provider today: No   present during visit today: Not Applicable.    Note:   Hoda is feeling well today. She denies fevers, chills, SOB, chest pain, nausea, diarrhea, and pain.    Intravenous Access:  Implanted Port.    Treatment Conditions:  Lab Results   Component Value Date    HGB 12.5 12/07/2022    WBC 7.2 12/07/2022    ANEU 3.1 06/22/2021    ANEUTAUTO 3.5 12/07/2022     12/07/2022      Lab Results   Component Value Date     12/07/2022    POTASSIUM 4.5 12/07/2022    CR 0.80 12/07/2022    TAYLER 9.2 12/07/2022    BILITOTAL 0.3 12/07/2022    ALBUMIN 4.1 12/07/2022    ALT 33 12/07/2022    AST 30 12/07/2022     Results reviewed, labs MET treatment parameters, ok to proceed with treatment.  ECHO/MUGA completed 9/15/22  EF 55-60%.    Post Infusion Assessment:  Patient tolerated infusion without incident.  Blood return noted pre and post infusion.  Site patent and intact, free from redness, edema or discomfort.  No evidence of extravasations.  Access discontinued per protocol.     Discharge Plan:   Patient declined prescription refills.  Discharge instructions reviewed with: Patient.  Patient and/or family verbalized understanding of discharge instructions and all questions answered.  AVS to patient via [employer].  Patient will return 12/28 for next appointment. Patient requested 12/28 appointmentss be moved to 12/29, message sent to scheduling.  Patient discharged in stable condition accompanied by: self.  Departure Mode: Ambulatory.      Sherry Louis RN

## 2022-12-09 LAB — CANCER AG27-29 SERPL-ACNC: <9 U/ML

## 2022-12-26 NOTE — PROGRESS NOTES
Hoda is a 67 year old who is being evaluated via a billable video visit.      How would you like to obtain your AVS? MyChart  If the video visit is dropped, the invitation should be resent by: Text to cell phone: [phone]  Will anyone else be joining your video visit? Yes, spouse Jim is there with her for the visit.  Kelsey Borja         Video-Visit Details    Hoda is a 67 year old who is being evaluated via a billable video visit.       Video duration: 6 minutes   Patient Location: work   Provider location: Off site     How would you like to obtain your AVS? MyChart  If the video visit is dropped, the invitation should be resent by: Text to cell phone: [phone]  Will anyone else be joining your video visit? Blanca Cooper      Hoda is a patient from [facility] and is seen here for continuation of care for her metastatic ER+HER2- breast cancer.  She is a [employer] refugee from [facility] and was initially seen in clinic with her daughter.  The only data we have from [facility] is an English translation of her records.       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  On 02/11/2014, she was seen by an oncologist.  The clinical staging of T4b N2 M1 was noted.   Her breast cancer was on the left side. There was no right breast cancer, confirmed by the patient and her daughter, correcting a possible error in the translated records.  ER was positive in 100% of the cells, IA at 14% and HER2 was 3+ positive.  It appears that this histopathologic information is on the mastectomy specimen. She underwent an MRI for staging of presumptive bone metastases which was performed 03/02/2014.  There were skeletal metastases in the thoracic vertebrae at 12, L1, L3, L5 and S1 vertebral body, ranging in size from 0.7-3.0 cm in size.  Ischial and right femur were also involved, as well as a large iliac mass  measuring about 15 cm in the uterus. 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   Radiation Oncology consultation was performed 03/11/2014, and she was given radiation in 1 dose of 6 Gy to the right iliac lesion.        With the initial diagnosis, she initiated treatment with 6 cycles of CAF neoadjuvant chemotherapy 02/14, 07/07, 03/28, 04/18/14, 05/08 and 05/29/14. She also received monthly zoledronic acid.  She then underwent a modified left mastectomy of Palacios type and left lymphadenoectomy on 06/27/2014.   Pathologic examination showed number at [facility] was [id number] showed infiltrative grade 2 ductal cancer with 6 level 1 metastatic lymph nodes and 3 level 2 metastatic lymph nodes.  The differentiation was intermediate.  The tumor was ER positive 70% of the cells, UT positive in 40% of the cells and HER2 was 3+ by immunohistochemistry and the Ki-67 labeling index was 20%. Her staging after surgery was stage IV, pT4b N2 M1.  I don't see a biopsy of the skeletal metastases.  She then had continuation with chemotherapy with 4 cycles of Herceptin and taxane with monthly zoledronic acid.  Tamoxifen was initiated.  She then had two years of Herceptin and a decision was made not to continue further Herceptin.  She continued on zoledronic acid every 3 months. She was clinically stable. [place] then [place] to the [place] new [place] from [facility].  She has now been changed to letrozole.  Denosumab has now been held for new diagnosis of osteonecrosis of the R maxilla.     History of cholecystectomy 2020.    Seen in the ED on 10/4/2021, for acute abdominal pain.  Elevated LFTs, elevated lipase.  Question of stone versus pancreatitis.  She was discharged and followed up with an outpatient gastroenterology clinic.  They recommended clear liquid diet and ERCP.        TREATMENT HISTORY:  A. Initial diagnosis with metastatic breast cancer in [facility].  Neoadjuvant CAF x 6.   B. Left mastectomy. Left axillary node dissection.  C.  Radiation  in 1 dose to R iliac region.  C. Herceptin for 2 years taxane for a prescribed course then stopped, monthly zoledronic acid.  She had 2 years of Herceptin with tamoxifen added after chemotherapy.  D.  Tamoxifen alone and zoledronic acid every 3 months starting 2014.  Letrozole was started   E.  [place]  We restarted Herceptin every 3 weeks and continued tamoxifen. Bone targeted agent changed to denosumab every 9 weeks. Zometa discontinued 2017.  Tamoxifen was changed to letrozole August 2019.    F.  Xgeva discontinued 12-17-19 because of dental issues.   G.  J+J vaccine March, 2021.  H.  Was on Zometa once May 11.  Reaction with eye lid swelling. Discontinued Zometa  H.  Restart Xgeva 6-22-21.  Continuing the trastuzumab and letrozole.  Both tolerated well.       INTERVAL HISTORY   Feels very well today. No new concerns. Denies chest pain, shortness of breath, or lower extremity swelling. No infectious symptoms. No new aches or pains. Energy level is stable. No new cough, no belly pain. Bowels moving well    Denies any dental or jaw pain.     ROS  Patient denies the following except if noted above: fevers, body aches, chills, headaches, vision changes, dizziness, chest pain, shortness of breath, nausea, vomiting, diarrhea, constipation, abdominal pain, rashes, bruising/bleeding, mouth sores, swelling or pain in the legs.       PHYSICAL EXAM:  General: Resting comfortably in no acute distress  Head: Normocephalic, atraumatic   EENT: No scleral icteris, EOMS intact.   Lung: Normal respiratory effort. Speaking fluently without shortness of breath. No audible wheezes or coughing.   Neuro: AAO x3. Moving upper extremities equally and symmetrically   Skin: Warm, dry, intact. No rashes, no suspicious lesions. No petechia or bruising noted on visible skin     The rest of a comprehensive physical examination is deferred due to PHE (public health emergency) video restrictions        Labs  Most Recent 3 CBC's:  Recent  Labs   Lab Test 12/07/22  0925 11/17/22  0858 10/28/22  1028   WBC 7.2 6.9 7.0   HGB 12.5 12.0 12.3   MCV 94 94 94    188 184    Most Recent 3 BMP's:  Recent Labs   Lab Test 12/07/22  0925 11/17/22  0858 10/28/22  1028    144 141   POTASSIUM 4.5 4.0 4.1   CHLORIDE 106 105 105   CO2 28 29 28   BUN 12.9 15.7 16.7   CR 0.80 0.81 0.80   ANIONGAP 8 10 8   TAYLER 9.2 8.9 9.7   GLC 92 98 118*    Most Recent 2 LFT's:  Recent Labs   Lab Test 12/07/22 0925 11/17/22  0858   AST 30 35   ALT 33 33   ALKPHOS 52 53   BILITOTAL 0.3 0.4      I reviewed the above labs today.  Repeat labs Thursday prior to infusion. Tumor markers stable     ASSESSMENT AND PLAN:     1.  Hoda Brush is a 67 year old   woman with a history of ER-positive, VA-positive, HER2 positive breast cancer.  She is from [facility], formally from [facility], and came to live in the [place] with her daughter.  The tumor is ER positive, VA positive and HER2 positive.  She had metastatic disease at the time of presentation with bone-only metastases by report.  She underwent neoadjuvant CAF, had a left mastectomy, left axillary lymph node dissection, radiation to the right hip, which included the right iliac region where she had metastatic disease.  She was initially on tamoxifen but then was switched to letrozole.  She continues on this and every 3-week trastuzumab. She has had no evidence of disease progression for the last 3 years.  We have continued Herceptin every 3 weeks as well as daily letrozole.   -- Most recent CT CAP stable, repeat CT in January as scheduled. Tumor markers stable   -- Proceed with herceptin on Thursday. Continue letrozole   -- Echo every 3 months-- ordered   -- Provider visit every 6 weeks     2. Bone health  - Xgeva was on hold due to possible ONJ. See Dr. Aguiar's note from 12/16--this was resumed on 1/6/22. To be given every 3 months--this was preioulsy confirmed with Dr. Aguiar.   -Currently no dental issues, Xgeva due on  Thursday    3. Insomnia/overactive bladder   - Improved on oxybutynin 5 mg at bedtime.     4. Vaccines  - Up to date on Flu and Covid       Patient will call in the interim with any questions or concerns. They voice understanding and agree with the above plan.     Jamee Mckeon PA-C

## 2022-12-27 ENCOUNTER — VIRTUAL VISIT (OUTPATIENT)
Dept: ONCOLOGY | Facility: CLINIC | Age: 67
End: 2022-12-27
Attending: PHYSICIAN ASSISTANT
Payer: COMMERCIAL

## 2022-12-27 DIAGNOSIS — C50.912 MALIGNANT NEOPLASM OF LEFT FEMALE BREAST, UNSPECIFIED ESTROGEN RECEPTOR STATUS, UNSPECIFIED SITE OF BREAST (H): Primary | ICD-10-CM

## 2022-12-27 PROCEDURE — 99213 OFFICE O/P EST LOW 20 MIN: CPT | Mod: 95 | Performed by: PHYSICIAN ASSISTANT

## 2022-12-27 RX ORDER — DIPHENHYDRAMINE HCL 25 MG
50 CAPSULE ORAL ONCE
Status: CANCELLED
Start: 2022-12-30

## 2022-12-27 RX ORDER — EPINEPHRINE 1 MG/ML
0.3 INJECTION, SOLUTION INTRAMUSCULAR; SUBCUTANEOUS EVERY 5 MIN PRN
Status: CANCELLED | OUTPATIENT
Start: 2022-12-30

## 2022-12-27 RX ORDER — ACETAMINOPHEN 325 MG/1
650 TABLET ORAL
Status: CANCELLED | OUTPATIENT
Start: 2022-12-30

## 2022-12-27 RX ORDER — ALBUTEROL SULFATE 90 UG/1
1-2 AEROSOL, METERED RESPIRATORY (INHALATION)
Status: CANCELLED
Start: 2022-12-30

## 2022-12-27 RX ORDER — METHYLPREDNISOLONE SODIUM SUCCINATE 125 MG/2ML
125 INJECTION, POWDER, LYOPHILIZED, FOR SOLUTION INTRAMUSCULAR; INTRAVENOUS
Status: CANCELLED
Start: 2022-12-30

## 2022-12-27 RX ORDER — ALBUTEROL SULFATE 0.83 MG/ML
2.5 SOLUTION RESPIRATORY (INHALATION)
Status: CANCELLED | OUTPATIENT
Start: 2022-12-30

## 2022-12-27 RX ORDER — SODIUM CHLORIDE 9 MG/ML
1000 INJECTION, SOLUTION INTRAVENOUS CONTINUOUS PRN
Status: CANCELLED
Start: 2022-12-30

## 2022-12-27 RX ORDER — EPINEPHRINE 0.3 MG/.3ML
0.3 INJECTION SUBCUTANEOUS EVERY 5 MIN PRN
Status: CANCELLED | OUTPATIENT
Start: 2022-12-30

## 2022-12-27 RX ORDER — LORAZEPAM 2 MG/ML
0.5 INJECTION INTRAMUSCULAR EVERY 4 HOURS PRN
Status: CANCELLED
Start: 2022-12-30

## 2022-12-27 RX ORDER — HEPARIN SODIUM (PORCINE) LOCK FLUSH IV SOLN 100 UNIT/ML 100 UNIT/ML
5 SOLUTION INTRAVENOUS EVERY 8 HOURS
Status: CANCELLED | OUTPATIENT
Start: 2022-12-30

## 2022-12-27 RX ORDER — DIPHENHYDRAMINE HYDROCHLORIDE 50 MG/ML
50 INJECTION INTRAMUSCULAR; INTRAVENOUS
Status: CANCELLED
Start: 2022-12-30

## 2022-12-27 NOTE — Clinical Note
12/27/2022         RE: Hoda Brush  [address]  [facility] [address]        Dear Colleague,    Thank you for referring your patient, Hoda Brush, to the [facility]. Please see a copy of my visit note below.    No notes on file    Again, thank you for allowing me to participate in the care of your patient.        Sincerely,        Jamee Mckeon PA-C

## 2022-12-29 ENCOUNTER — APPOINTMENT (OUTPATIENT)
Dept: LAB | Facility: CLINIC | Age: 67
End: 2022-12-29
Attending: PHYSICIAN ASSISTANT
Payer: COMMERCIAL

## 2022-12-29 ENCOUNTER — INFUSION THERAPY VISIT (OUTPATIENT)
Dept: ONCOLOGY | Facility: CLINIC | Age: 67
End: 2022-12-29
Attending: PHYSICIAN ASSISTANT
Payer: COMMERCIAL

## 2022-12-29 VITALS
WEIGHT: 173 LBS | DIASTOLIC BLOOD PRESSURE: 72 MMHG | TEMPERATURE: 98 F | BODY MASS INDEX: 30.89 KG/M2 | HEART RATE: 82 BPM | SYSTOLIC BLOOD PRESSURE: 116 MMHG | RESPIRATION RATE: 18 BRPM | OXYGEN SATURATION: 97 %

## 2022-12-29 DIAGNOSIS — C79.51 BONE METASTASIS: ICD-10-CM

## 2022-12-29 DIAGNOSIS — C50.912 MALIGNANT NEOPLASM OF LEFT FEMALE BREAST, UNSPECIFIED ESTROGEN RECEPTOR STATUS, UNSPECIFIED SITE OF BREAST (H): Primary | ICD-10-CM

## 2022-12-29 LAB
ALBUMIN SERPL BCG-MCNC: 4 G/DL (ref 3.5–5.2)
ALP SERPL-CCNC: 49 U/L (ref 35–104)
ALT SERPL W P-5'-P-CCNC: 34 U/L (ref 10–35)
ANION GAP SERPL CALCULATED.3IONS-SCNC: 10 MMOL/L (ref 7–15)
AST SERPL W P-5'-P-CCNC: 32 U/L (ref 10–35)
BASOPHILS # BLD AUTO: 0 10E3/UL (ref 0–0.2)
BASOPHILS NFR BLD AUTO: 1 %
BILIRUB SERPL-MCNC: 0.4 MG/DL
BUN SERPL-MCNC: 14.3 MG/DL (ref 8–23)
CALCIUM SERPL-MCNC: 9.2 MG/DL (ref 8.8–10.2)
CEA SERPL-MCNC: 0.7 NG/ML
CHLORIDE SERPL-SCNC: 106 MMOL/L (ref 98–107)
CREAT SERPL-MCNC: 0.79 MG/DL (ref 0.51–0.95)
DEPRECATED HCO3 PLAS-SCNC: 26 MMOL/L (ref 22–29)
EOSINOPHIL # BLD AUTO: 0.3 10E3/UL (ref 0–0.7)
EOSINOPHIL NFR BLD AUTO: 4 %
ERYTHROCYTE [DISTWIDTH] IN BLOOD BY AUTOMATED COUNT: 13.3 % (ref 10–15)
GFR SERPL CREATININE-BSD FRML MDRD: 82 ML/MIN/1.73M2
GLUCOSE SERPL-MCNC: 98 MG/DL (ref 70–99)
HCT VFR BLD AUTO: 39.3 % (ref 35–47)
HGB BLD-MCNC: 12.3 G/DL (ref 11.7–15.7)
IMM GRANULOCYTES # BLD: 0 10E3/UL
IMM GRANULOCYTES NFR BLD: 0 %
LYMPHOCYTES # BLD AUTO: 2.8 10E3/UL (ref 0.8–5.3)
LYMPHOCYTES NFR BLD AUTO: 36 %
MCH RBC QN AUTO: 29.2 PG (ref 26.5–33)
MCHC RBC AUTO-ENTMCNC: 31.3 G/DL (ref 31.5–36.5)
MCV RBC AUTO: 93 FL (ref 78–100)
MONOCYTES # BLD AUTO: 0.5 10E3/UL (ref 0–1.3)
MONOCYTES NFR BLD AUTO: 6 %
NEUTROPHILS # BLD AUTO: 4.2 10E3/UL (ref 1.6–8.3)
NEUTROPHILS NFR BLD AUTO: 53 %
NRBC # BLD AUTO: 0 10E3/UL
NRBC BLD AUTO-RTO: 0 /100
PLATELET # BLD AUTO: 226 10E3/UL (ref 150–450)
POTASSIUM SERPL-SCNC: 4 MMOL/L (ref 3.4–5.3)
PROT SERPL-MCNC: 7.4 G/DL (ref 6.4–8.3)
RBC # BLD AUTO: 4.21 10E6/UL (ref 3.8–5.2)
SODIUM SERPL-SCNC: 142 MMOL/L (ref 136–145)
WBC # BLD AUTO: 7.7 10E3/UL (ref 4–11)

## 2022-12-29 PROCEDURE — 258N000003 HC RX IP 258 OP 636: Performed by: PHYSICIAN ASSISTANT

## 2022-12-29 PROCEDURE — 82378 CARCINOEMBRYONIC ANTIGEN: CPT | Performed by: PHYSICIAN ASSISTANT

## 2022-12-29 PROCEDURE — 80053 COMPREHEN METABOLIC PANEL: CPT | Performed by: PHYSICIAN ASSISTANT

## 2022-12-29 PROCEDURE — 85025 COMPLETE CBC W/AUTO DIFF WBC: CPT | Performed by: PHYSICIAN ASSISTANT

## 2022-12-29 PROCEDURE — 250N000011 HC RX IP 250 OP 636: Performed by: PHYSICIAN ASSISTANT

## 2022-12-29 PROCEDURE — 36591 DRAW BLOOD OFF VENOUS DEVICE: CPT | Performed by: PHYSICIAN ASSISTANT

## 2022-12-29 PROCEDURE — 86300 IMMUNOASSAY TUMOR CA 15-3: CPT | Performed by: PHYSICIAN ASSISTANT

## 2022-12-29 PROCEDURE — 96413 CHEMO IV INFUSION 1 HR: CPT

## 2022-12-29 RX ORDER — HEPARIN SODIUM (PORCINE) LOCK FLUSH IV SOLN 100 UNIT/ML 100 UNIT/ML
5 SOLUTION INTRAVENOUS EVERY 8 HOURS
Status: DISCONTINUED | OUTPATIENT
Start: 2022-12-29 | End: 2022-12-29 | Stop reason: HOSPADM

## 2022-12-29 RX ADMIN — TRASTUZUMAB 450 MG: 150 INJECTION, POWDER, LYOPHILIZED, FOR SOLUTION INTRAVENOUS at 11:08

## 2022-12-29 RX ADMIN — Medication 5 ML: at 09:44

## 2022-12-29 RX ADMIN — Medication 5 ML: at 11:43

## 2022-12-29 ASSESSMENT — PAIN SCALES - GENERAL: PAINLEVEL: NO PAIN (0)

## 2022-12-29 NOTE — NURSING NOTE
Chief Complaint   Patient presents with     Port Draw     Power needle. Heparin locked, vitals checked     Pia Schneider RN on 12/29/2022 at 9:46 AM

## 2022-12-29 NOTE — PATIENT INSTRUCTIONS
[facility] Triage and after hours / weekends / holidays:  [phone]    Please call the triage or after hours line if you experience a temperature greater than or equal to 100.4, shaking chills, have uncontrolled nausea, vomiting and/or diarrhea, dizziness, shortness of breath, chest pain, bleeding, unexplained bruising, or if you have any other new/concerning symptoms, questions or concerns.      If you are having any concerning symptoms or wish to speak to a provider before your next infusion visit, please call your care coordinator or triage to notify them so we can adequately serve you.     If you need a refill on a narcotic prescription or other medication, please call before your infusion appointment.                December 2022 Sunday Monday Tuesday Wednesday Thursday Friday Saturday                       1     2     3       4     5     6     7    LAB CENTRAL   9:00 AM   (15 min.)   [facility] LAB DRAW   [facility]    ONC INFUSION 1 HR (60 MIN)  10:00 AM   (60 min.)    ONC INFUSION NURSE   [facility] 8     9     10       11     12     13     14     15     16     17       18     19     20     21     22     23     24       25     26     27    VIDEO VISIT RETURN  10:45 AM   (45 min.)   Jamee Mckeon PA-C   [facility] 28 29    LAB CENTRAL   9:30 AM   (15 min.)   [facility] LAB DRAW   [facility]    ONC INFUSION 1 HR (60 MIN)  10:00 AM   (60 min.)    ONC INFUSION NURSE   [facility] 30 31 January 2023 Sunday Monday Tuesday Wednesday Thursday Friday Saturday   1     2     3     4     5     6     7       8     9     10     11     12     13     14       15     16    ECHO COMPLETE  10:00 AM   (60 min.)   [id number]   [facility]    CT CHEST/ABDOMEN/PELVIS W  10:50 AM   (20 min.)   SHCT1     [facility] 17     18     19    LAB CENTRAL   9:15 AM   (15 min.)    [facility] DRAW   [facility]    RETURN   9:25 AM   (30 min.)   Lisandro Aguiar MD   [facility]    ONC INFUSION 1 HR (60 MIN)  11:00 AM   (60 min.)    ONC INFUSION NURSE   [facility] 20     21       22     23     24     25     26     27     28       29     30     31                                           Lab Results:  Recent Results (from the past 12 hour(s))   CBC with platelets and differential    Collection Time: 12/29/22  9:42 AM   Result Value Ref Range    WBC Count 7.7 4.0 - 11.0 10e3/uL    RBC Count 4.21 3.80 - 5.20 10e6/uL    Hemoglobin 12.3 11.7 - 15.7 g/dL    Hematocrit 39.3 35.0 - 47.0 %    MCV 93 78 - 100 fL    MCH 29.2 26.5 - 33.0 pg    MCHC 31.3 (L) 31.5 - 36.5 g/dL    RDW 13.3 10.0 - 15.0 %    Platelet Count 226 150 - 450 10e3/uL    % Neutrophils 53 %    % Lymphocytes 36 %    % Monocytes 6 %    % Eosinophils 4 %    % Basophils 1 %    % Immature Granulocytes 0 %    NRBCs per 100 WBC 0 <1 /100    Absolute Neutrophils 4.2 1.6 - 8.3 10e3/uL    Absolute Lymphocytes 2.8 0.8 - 5.3 10e3/uL    Absolute Monocytes 0.5 0.0 - 1.3 10e3/uL    Absolute Eosinophils 0.3 0.0 - 0.7 10e3/uL    Absolute Basophils 0.0 0.0 - 0.2 10e3/uL    Absolute Immature Granulocytes 0.0 <=0.4 10e3/uL    Absolute NRBCs 0.0 10e3/uL

## 2022-12-29 NOTE — PROGRESS NOTES
Infusion Nursing Note:  Hoda Brush presents today for cycle 91, day 1 Herceptin.    Patient seen by provider today: No   present during visit today: Patient refused  services and a waiver form has been signed.     Note: Patient arrives feeling well. She had a virtual visit with Jamee Mckeon on 12/27, states no new concerns/complaints since.    Intravenous Access:  Implanted Port.    Treatment Conditions:  Lab Results   Component Value Date    HGB 12.3 12/29/2022    WBC 7.7 12/29/2022    ANEU 3.1 06/22/2021    ANEUTAUTO 4.2 12/29/2022     12/29/2022      Lab Results   Component Value Date     12/07/2022    POTASSIUM 4.5 12/07/2022    CR 0.80 12/07/2022    TAYLER 9.2 12/07/2022    BILITOTAL 0.3 12/07/2022    ALBUMIN 4.1 12/07/2022    ALT 33 12/07/2022    AST 30 12/07/2022     ECHO/MUGA completed 9/15/22  EF 55-60%. Per Jamee Mckeon's 12/27 note- okay to proceed with Herceptin, next echo scheduled.  Echo scheduled on 1/16/23    Post Infusion Assessment:  Patient tolerated infusion without incident.  Blood return noted pre and post infusion.  Site patent and intact, free from redness, edema or discomfort.  No evidence of extravasations.  Access discontinued per protocol.     Discharge Plan:   Patient declined prescription refills.  Discharge instructions reviewed with: Patient.  Patient and/or family verbalized understanding of discharge instructions and all questions answered.  AVS to patient via [employer].  Patient will return 1/16/23 and 1/19/23 for next appointment.   Patient discharged in stable condition accompanied by: self.  Departure Mode: Ambulatory.      Marycarmen Cruz RN

## 2022-12-30 LAB — CANCER AG27-29 SERPL-ACNC: 9.9 U/ML

## 2023-01-16 ENCOUNTER — HOSPITAL ENCOUNTER (OUTPATIENT)
Dept: CT IMAGING | Facility: CLINIC | Age: 68
Discharge: HOME | End: 2023-01-16
Attending: PHYSICIAN ASSISTANT
Payer: COMMERCIAL

## 2023-01-16 ENCOUNTER — HOSPITAL ENCOUNTER (OUTPATIENT)
Dept: CARDIOLOGY | Facility: CLINIC | Age: 68
Discharge: HOME | End: 2023-01-16
Attending: INTERNAL MEDICINE
Payer: COMMERCIAL

## 2023-01-16 ENCOUNTER — HOSPITAL ENCOUNTER (OUTPATIENT)
Facility: CLINIC | Age: 68
Discharge: HOME | End: 2023-01-16
Payer: COMMERCIAL

## 2023-01-16 DIAGNOSIS — Z51.11 ENCOUNTER FOR ANTINEOPLASTIC CHEMOTHERAPY: ICD-10-CM

## 2023-01-16 DIAGNOSIS — C50.919 METASTATIC BREAST CANCER: ICD-10-CM

## 2023-01-16 LAB — LVEF ECHO: NORMAL

## 2023-01-16 PROCEDURE — 93306 TTE W/DOPPLER COMPLETE: CPT

## 2023-01-16 PROCEDURE — 74177 CT ABD & PELVIS W/CONTRAST: CPT

## 2023-01-16 PROCEDURE — 999N000154 HC STATISTIC RADIOLOGY XRAY, US, CT, MAR, NM

## 2023-01-16 PROCEDURE — 250N000011 HC RX IP 250 OP 636: Performed by: PHYSICIAN ASSISTANT

## 2023-01-16 PROCEDURE — 250N000009 HC RX 250: Performed by: PHYSICIAN ASSISTANT

## 2023-01-16 PROCEDURE — 93306 TTE W/DOPPLER COMPLETE: CPT | Mod: 26 | Performed by: INTERNAL MEDICINE

## 2023-01-16 RX ORDER — HEPARIN SODIUM (PORCINE) LOCK FLUSH IV SOLN 100 UNIT/ML 100 UNIT/ML
5-10 SOLUTION INTRAVENOUS
Status: DISCONTINUED | OUTPATIENT
Start: 2023-01-16 | End: 2023-01-16 | Stop reason: HOSPADM

## 2023-01-16 RX ORDER — IOPAMIDOL 755 MG/ML
84 INJECTION, SOLUTION INTRAVASCULAR ONCE
Status: COMPLETED | OUTPATIENT
Start: 2023-01-16 | End: 2023-01-16

## 2023-01-16 RX ORDER — HEPARIN SODIUM,PORCINE 10 UNIT/ML
5-10 VIAL (ML) INTRAVENOUS
Status: DISCONTINUED | OUTPATIENT
Start: 2023-01-16 | End: 2023-01-16 | Stop reason: HOSPADM

## 2023-01-16 RX ORDER — HEPARIN SODIUM,PORCINE 10 UNIT/ML
5-10 VIAL (ML) INTRAVENOUS EVERY 24 HOURS
Status: DISCONTINUED | OUTPATIENT
Start: 2023-01-16 | End: 2023-01-16 | Stop reason: HOSPADM

## 2023-01-16 RX ADMIN — SODIUM CHLORIDE 64 ML: 9 INJECTION, SOLUTION INTRAVENOUS at 11:06

## 2023-01-16 RX ADMIN — Medication 5 ML: at 11:12

## 2023-01-16 RX ADMIN — IOPAMIDOL 84 ML: 755 INJECTION, SOLUTION INTRAVENOUS at 11:06

## 2023-01-16 ASSESSMENT — ACTIVITIES OF DAILY LIVING (ADL): ADLS_ACUITY_SCORE: 35

## 2023-01-20 ENCOUNTER — APPOINTMENT (OUTPATIENT)
Dept: LAB | Facility: CLINIC | Age: 68
End: 2023-01-20
Attending: PHYSICIAN ASSISTANT
Payer: COMMERCIAL

## 2023-01-20 ENCOUNTER — ONCOLOGY VISIT (OUTPATIENT)
Dept: ONCOLOGY | Facility: CLINIC | Age: 68
End: 2023-01-20
Attending: PHYSICIAN ASSISTANT
Payer: COMMERCIAL

## 2023-01-20 VITALS
OXYGEN SATURATION: 95 % | BODY MASS INDEX: 31.21 KG/M2 | WEIGHT: 174.8 LBS | RESPIRATION RATE: 18 BRPM | TEMPERATURE: 97.9 F | SYSTOLIC BLOOD PRESSURE: 109 MMHG | DIASTOLIC BLOOD PRESSURE: 68 MMHG | HEART RATE: 79 BPM

## 2023-01-20 DIAGNOSIS — C50.912 MALIGNANT NEOPLASM OF LEFT FEMALE BREAST, UNSPECIFIED ESTROGEN RECEPTOR STATUS, UNSPECIFIED SITE OF BREAST (H): Primary | ICD-10-CM

## 2023-01-20 LAB
ALBUMIN SERPL BCG-MCNC: 4.1 G/DL (ref 3.5–5.2)
ALP SERPL-CCNC: 57 U/L (ref 35–104)
ALT SERPL W P-5'-P-CCNC: 27 U/L (ref 10–35)
ANION GAP SERPL CALCULATED.3IONS-SCNC: 8 MMOL/L (ref 7–15)
AST SERPL W P-5'-P-CCNC: 26 U/L (ref 10–35)
BASOPHILS # BLD AUTO: 0.1 10E3/UL (ref 0–0.2)
BASOPHILS NFR BLD AUTO: 1 %
BILIRUB SERPL-MCNC: 0.2 MG/DL
BUN SERPL-MCNC: 11.9 MG/DL (ref 8–23)
CALCIUM SERPL-MCNC: 8.7 MG/DL (ref 8.8–10.2)
CEA SERPL-MCNC: 1.1 NG/ML
CHLORIDE SERPL-SCNC: 106 MMOL/L (ref 98–107)
CREAT SERPL-MCNC: 0.79 MG/DL (ref 0.51–0.95)
DEPRECATED HCO3 PLAS-SCNC: 27 MMOL/L (ref 22–29)
EOSINOPHIL # BLD AUTO: 0.3 10E3/UL (ref 0–0.7)
EOSINOPHIL NFR BLD AUTO: 4 %
ERYTHROCYTE [DISTWIDTH] IN BLOOD BY AUTOMATED COUNT: 13.2 % (ref 10–15)
GFR SERPL CREATININE-BSD FRML MDRD: 82 ML/MIN/1.73M2
GLUCOSE SERPL-MCNC: 109 MG/DL (ref 70–99)
HCT VFR BLD AUTO: 40.2 % (ref 35–47)
HGB BLD-MCNC: 12.5 G/DL (ref 11.7–15.7)
IMM GRANULOCYTES # BLD: 0 10E3/UL
IMM GRANULOCYTES NFR BLD: 0 %
LYMPHOCYTES # BLD AUTO: 2.9 10E3/UL (ref 0.8–5.3)
LYMPHOCYTES NFR BLD AUTO: 38 %
MCH RBC QN AUTO: 29.1 PG (ref 26.5–33)
MCHC RBC AUTO-ENTMCNC: 31.1 G/DL (ref 31.5–36.5)
MCV RBC AUTO: 94 FL (ref 78–100)
MONOCYTES # BLD AUTO: 0.5 10E3/UL (ref 0–1.3)
MONOCYTES NFR BLD AUTO: 7 %
NEUTROPHILS # BLD AUTO: 3.8 10E3/UL (ref 1.6–8.3)
NEUTROPHILS NFR BLD AUTO: 50 %
NRBC # BLD AUTO: 0 10E3/UL
NRBC BLD AUTO-RTO: 0 /100
PLATELET # BLD AUTO: 185 10E3/UL (ref 150–450)
POTASSIUM SERPL-SCNC: 4.2 MMOL/L (ref 3.4–5.3)
PROT SERPL-MCNC: 7.6 G/DL (ref 6.4–8.3)
RBC # BLD AUTO: 4.29 10E6/UL (ref 3.8–5.2)
SODIUM SERPL-SCNC: 141 MMOL/L (ref 136–145)
WBC # BLD AUTO: 7.7 10E3/UL (ref 4–11)

## 2023-01-20 PROCEDURE — G0463 HOSPITAL OUTPT CLINIC VISIT: HCPCS | Mod: 25

## 2023-01-20 PROCEDURE — 85025 COMPLETE CBC W/AUTO DIFF WBC: CPT | Performed by: PHYSICIAN ASSISTANT

## 2023-01-20 PROCEDURE — 80053 COMPREHEN METABOLIC PANEL: CPT | Performed by: PHYSICIAN ASSISTANT

## 2023-01-20 PROCEDURE — 86300 IMMUNOASSAY TUMOR CA 15-3: CPT | Performed by: PHYSICIAN ASSISTANT

## 2023-01-20 PROCEDURE — G0463 HOSPITAL OUTPT CLINIC VISIT: HCPCS | Performed by: PHYSICIAN ASSISTANT

## 2023-01-20 PROCEDURE — G0463 HOSPITAL OUTPT CLINIC VISIT: HCPCS

## 2023-01-20 PROCEDURE — 36591 DRAW BLOOD OFF VENOUS DEVICE: CPT | Performed by: PHYSICIAN ASSISTANT

## 2023-01-20 PROCEDURE — 96413 CHEMO IV INFUSION 1 HR: CPT

## 2023-01-20 PROCEDURE — G0463 HOSPITAL OUTPT CLINIC VISIT: HCPCS | Mod: 25 | Performed by: PHYSICIAN ASSISTANT

## 2023-01-20 PROCEDURE — 82378 CARCINOEMBRYONIC ANTIGEN: CPT | Performed by: PHYSICIAN ASSISTANT

## 2023-01-20 PROCEDURE — 258N000003 HC RX IP 258 OP 636: Performed by: PHYSICIAN ASSISTANT

## 2023-01-20 PROCEDURE — 250N000011 HC RX IP 250 OP 636: Performed by: PHYSICIAN ASSISTANT

## 2023-01-20 PROCEDURE — 99214 OFFICE O/P EST MOD 30 MIN: CPT | Performed by: PHYSICIAN ASSISTANT

## 2023-01-20 RX ORDER — ALBUTEROL SULFATE 0.83 MG/ML
2.5 SOLUTION RESPIRATORY (INHALATION)
Status: CANCELLED | OUTPATIENT
Start: 2023-01-20

## 2023-01-20 RX ORDER — DIPHENHYDRAMINE HCL 25 MG
50 CAPSULE ORAL ONCE
Status: CANCELLED
Start: 2023-01-20

## 2023-01-20 RX ORDER — LORAZEPAM 2 MG/ML
0.5 INJECTION INTRAMUSCULAR EVERY 4 HOURS PRN
Status: CANCELLED
Start: 2023-01-20

## 2023-01-20 RX ORDER — SODIUM CHLORIDE 9 MG/ML
1000 INJECTION, SOLUTION INTRAVENOUS CONTINUOUS PRN
Status: CANCELLED
Start: 2023-01-20

## 2023-01-20 RX ORDER — HEPARIN SODIUM (PORCINE) LOCK FLUSH IV SOLN 100 UNIT/ML 100 UNIT/ML
5 SOLUTION INTRAVENOUS ONCE
Status: COMPLETED | OUTPATIENT
Start: 2023-01-20 | End: 2023-01-20

## 2023-01-20 RX ORDER — HEPARIN SODIUM (PORCINE) LOCK FLUSH IV SOLN 100 UNIT/ML 100 UNIT/ML
5 SOLUTION INTRAVENOUS EVERY 8 HOURS
Status: DISCONTINUED | OUTPATIENT
Start: 2023-01-20 | End: 2023-01-20 | Stop reason: HOSPADM

## 2023-01-20 RX ORDER — EPINEPHRINE 0.3 MG/.3ML
0.3 INJECTION SUBCUTANEOUS EVERY 5 MIN PRN
Status: CANCELLED | OUTPATIENT
Start: 2023-01-20

## 2023-01-20 RX ORDER — EPINEPHRINE 1 MG/ML
0.3 INJECTION, SOLUTION INTRAMUSCULAR; SUBCUTANEOUS EVERY 5 MIN PRN
Status: CANCELLED | OUTPATIENT
Start: 2023-01-20

## 2023-01-20 RX ORDER — DIPHENHYDRAMINE HYDROCHLORIDE 50 MG/ML
50 INJECTION INTRAMUSCULAR; INTRAVENOUS
Status: CANCELLED
Start: 2023-01-20

## 2023-01-20 RX ORDER — METHYLPREDNISOLONE SODIUM SUCCINATE 125 MG/2ML
125 INJECTION, POWDER, LYOPHILIZED, FOR SOLUTION INTRAMUSCULAR; INTRAVENOUS
Status: CANCELLED
Start: 2023-01-20

## 2023-01-20 RX ORDER — HEPARIN SODIUM (PORCINE) LOCK FLUSH IV SOLN 100 UNIT/ML 100 UNIT/ML
5 SOLUTION INTRAVENOUS EVERY 8 HOURS
Status: CANCELLED | OUTPATIENT
Start: 2023-01-20

## 2023-01-20 RX ORDER — ALBUTEROL SULFATE 90 UG/1
1-2 AEROSOL, METERED RESPIRATORY (INHALATION)
Status: CANCELLED
Start: 2023-01-20

## 2023-01-20 RX ORDER — ACETAMINOPHEN 325 MG/1
650 TABLET ORAL
Status: CANCELLED | OUTPATIENT
Start: 2023-01-20

## 2023-01-20 RX ADMIN — Medication 5 ML: at 08:36

## 2023-01-20 RX ADMIN — TRASTUZUMAB 450 MG: 150 INJECTION, POWDER, LYOPHILIZED, FOR SOLUTION INTRAVENOUS at 09:57

## 2023-01-20 RX ADMIN — Medication 5 ML: at 10:30

## 2023-01-20 ASSESSMENT — PAIN SCALES - GENERAL: PAINLEVEL: NO PAIN (0)

## 2023-01-20 NOTE — PATIENT INSTRUCTIONS
[facility] Triage and after hours / weekends / holidays:  [phone]    Please call the triage or after hours line if you experience a temperature greater than or equal to 100.4, shaking chills, have uncontrolled nausea, vomiting and/or diarrhea, dizziness, shortness of breath, chest pain, bleeding, unexplained bruising, or if you have any other new/concerning symptoms, questions or concerns.      If you are having any concerning symptoms or wish to speak to a provider before your next infusion visit, please call your care coordinator or triage to notify them so we can adequately serve you.     If you need a refill on a narcotic prescription or other medication, please call before your infusion appointment.

## 2023-01-20 NOTE — PROGRESS NOTES
Infusion Nursing Note:  Hoda Brush presents today for C11D1 Trastuzumab.    Patient seen by provider today: Yes: Jamee Mckeon PA-C prior to infusion visit   present during visit today: Patient refused  services and a waiver form has been signed.     Note: Hoda denied any questions or concerns following her visit with the provider prior to infusion today.    Intravenous Access:  Peripheral IV placed.    Treatment Conditions:   Latest Reference Range & Units 01/20/23 08:42   Sodium 136 - 145 mmol/L 141   Potassium 3.4 - 5.3 mmol/L 4.2   Chloride 98 - 107 mmol/L 106   Carbon Dioxide (CO2) 22 - 29 mmol/L 27   Urea Nitrogen 8.0 - 23.0 mg/dL 11.9   Creatinine 0.51 - 0.95 mg/dL 0.79   GFR Estimate >60 mL/min/1.73m2 82   Calcium 8.8 - 10.2 mg/dL 8.7 (L)   Anion Gap 7 - 15 mmol/L 8   Albumin 3.5 - 5.2 g/dL 4.1   Protein Total 6.4 - 8.3 g/dL 7.6   Alkaline Phosphatase 35 - 104 U/L 57   ALT 10 - 35 U/L 27   AST 10 - 35 U/L 26   Bilirubin Total <=1.2 mg/dL 0.2   Glucose 70 - 99 mg/dL 109 (H)   WBC 4.0 - 11.0 10e3/uL 7.7   Hemoglobin 11.7 - 15.7 g/dL 12.5   Hematocrit 35.0 - 47.0 % 40.2   Platelet Count 150 - 450 10e3/uL 185   RBC Count 3.80 - 5.20 10e6/uL 4.29   MCV 78 - 100 fL 94   MCH 26.5 - 33.0 pg 29.1   MCHC 31.5 - 36.5 g/dL 31.1 (L)   RDW 10.0 - 15.0 % 13.2   % Neutrophils % 50   % Lymphocytes % 38   % Monocytes % 7   % Eosinophils % 4   % Basophils % 1   Absolute Basophils 0.0 - 0.2 10e3/uL 0.1   Absolute Eosinophils 0.0 - 0.7 10e3/uL 0.3   Absolute Immature Granulocytes <=0.4 10e3/uL 0.0   Absolute Lymphocytes 0.8 - 5.3 10e3/uL 2.9   Absolute Monocytes 0.0 - 1.3 10e3/uL 0.5   % Immature Granulocytes % 0   Absolute Neutrophils 1.6 - 8.3 10e3/uL 3.8   Absolute NRBCs 10e3/uL 0.0   NRBCs per 100 WBC <1 /100 0     Results reviewed, labs MET treatment parameters, ok to proceed with treatment.  ECHO/MUGA completed 1/16/23  EF 60-65%.    Post Infusion Assessment:  Patient tolerated infusion  without incident.  Blood return noted pre and post infusion.  Site patent and intact, free from redness, edema or discomfort.  No evidence of extravasations.  Access discontinued per protocol.     Discharge Plan:   Patient declined prescription refills.  Discharge instructions reviewed with: Patient.  Patient and/or family verbalized understanding of discharge instructions and all questions answered.  AVS to patient via [employer].  Patient will return 2/9 for next appointment.   Patient discharged in stable condition accompanied by: self.  Departure Mode: Ambulatory.      Gwen Garcia RN

## 2023-01-20 NOTE — PROGRESS NOTES
Hoda is a patient from [facility] and is seen here for continuation of care for her metastatic ER+HER2- breast cancer.  She is a [place] refugee from [facility] and was initially seen in clinic with her daughter.  The only data we have from [facility] is an English translation of her records.       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  On 02/11/2014, she was seen by an oncologist.  The clinical staging of T4b N2 M1 was noted.   Her breast cancer was on the left side. There was no right breast cancer, confirmed by the patient and her daughter, correcting a possible error in the translated records.  ER was positive in 100% of the cells, MI at 14% and HER2 was 3+ positive.  It appears that this histopathologic information is on the mastectomy specimen. She underwent an MRI for staging of presumptive bone metastases which was performed 03/02/2014.  There were skeletal metastases in the thoracic vertebrae at 12, L1, L3, L5 and S1 vertebral body, ranging in size from 0.7-3.0 cm in size.  Ischial and right femur were also involved, as well as a large iliac mass measuring about 15 cm in the uterus. 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   Radiation Oncology consultation was performed 03/11/2014, and she was given radiation in 1 dose of 6 Gy to the right iliac lesion.        With the initial diagnosis, she initiated treatment with 6 cycles of CAF neoadjuvant chemotherapy 02/14, 07/07, 03/28, 04/18/14, 05/08 and 05/29/14. She also received monthly zoledronic acid.  She then underwent a modified left mastectomy of Palacios type and left lymphadenoectomy on 06/27/2014.   Pathologic examination showed number at [facility] was [id number] showed infiltrative grade 2 ductal cancer with 6 level 1 metastatic lymph nodes and 3 level 2 metastatic lymph nodes.  The differentiation was intermediate.  The tumor was ER positive 70% of the cells, MI  positive in 40% of the cells and HER2 was 3+ by immunohistochemistry and the Ki-67 labeling index was 20%. Her staging after surgery was stage IV, pT4b N2 M1.  I don't see a biopsy of the skeletal metastases.  She then had continuation with chemotherapy with 4 cycles of Herceptin and taxane with monthly zoledronic acid.  Tamoxifen was initiated.  She then had two years of Herceptin and a decision was made not to continue further Herceptin.  She continued on zoledronic acid every 3 months. She was clinically stable. [place] then moved [place] the [place] new [place] from [facility].  She has now been changed to letrozole.  Denosumab has now been held for new diagnosis of osteonecrosis of the R maxilla.     History of cholecystectomy 2020.    Seen in the ED on 10/4/2021, for acute abdominal pain.  Elevated LFTs, elevated lipase.  Question of stone versus pancreatitis.  She was discharged and followed up with an outpatient gastroenterology clinic.  They recommended clear liquid diet and ERCP.        TREATMENT HISTORY:  A. Initial diagnosis with metastatic breast cancer in [facility].  Neoadjuvant CAF x 6.   B. Left mastectomy. Left axillary node dissection.  C.  Radiation in 1 dose to R iliac region.  C. Herceptin for 2 years taxane for a prescribed course then stopped, monthly zoledronic acid.  She had 2 years of Herceptin with tamoxifen added after chemotherapy.  D.  Tamoxifen alone and zoledronic acid every 3 months starting 2014.  Letrozole was started   E[place]  [place]  We restarted Herceptin every 3 weeks and continued tamoxifen. Bone targeted agent changed to denosumab every 9 weeks. Zometa discontinued 2017.  Tamoxifen was changed to letrozole August 2019.    F.  Xgeva discontinued 12-17-19 because of dental issues.   G.  J+J vaccine March, 2021.  H.  Was on Zometa once May 11.  Reaction with eye lid swelling. Discontinued Zometa  H.  Restart Xgeva 6-22-21.  Continuing the trastuzumab and letrozole.  Both tolerated  well.       INTERVAL HISTORY   Today, she states that she is feeling very well. Denies any concerns. No headaches or blurry vision. No chest pain, shortness of breath, or LE swelling. Continues to tolerate letrozole well without any significant side effects. No recent fevers, chills, or infectious symptoms. No new dental concerns. Is remaining active. Taking calcium and vitamin D.     She became a citizen yesterday and is looking forward to celebrating with family     ROS  Patient denies the following except if noted above: fevers, body aches, chills, headaches, vision changes, dizziness, chest pain, shortness of breath, nausea, vomiting, diarrhea, constipation, abdominal pain, rashes, bruising/bleeding, mouth sores, swelling or pain in the legs.       PHYSICAL EXAM:  /68 (BP Location: Right arm, Patient Position: Sitting, Cuff Size: Adult Regular)   Pulse 79   Temp 97.9  F (36.6  C) (Oral)   Resp 18   Wt 79.3 kg (174 lb 12.8 oz)   SpO2 95%   BMI 31.21 kg/m      Wt Readings from Last 4 Encounters:   01/20/23 79.3 kg (174 lb 12.8 oz)   12/29/22 78.5 kg (173 lb)   12/07/22 80.9 kg (178 lb 4.8 oz)   11/17/22 82 kg (180 lb 11.2 oz)       VS: Above vitals reviewed, stable without concerns   General: Resting comfortably in no acute distress  Head: Normocephalic, atraumatic   Lymph: No palpable cervical, supraclavicular, or axillary lymphadenopathy   Heart: Regular rate and rhythm, no murmurs  Lung: Normal respiratory effort. Clear to auscultation bilaterally. No wheezes, rhonchi, or crackles   Abdomen: +Bowel Sounds, soft nontender to palpation. No rebound, guarding or rigidity. No palpable masses  Neuro: AAO x3. Gait is steady. Moving all extremities   Extremities: No lower extremity edema  Skin: Warm, dry, intact. No rashes, no suspicious lesions. No petechia or bruising noted             Labs  Most Recent 3 CBC's:  Recent Labs   Lab Test 12/29/22  0942 12/07/22  0925 11/17/22  0858   WBC 7.7 7.2 6.9   HGB  12.3 12.5 12.0   MCV 93 94 94    226 188    Most Recent 3 BMP's:  Recent Labs   Lab Test 12/29/22  0942 12/07/22  0925 11/17/22  0858    142 144   POTASSIUM 4.0 4.5 4.0   CHLORIDE 106 106 105   CO2 26 28 29   BUN 14.3 12.9 15.7   CR 0.79 0.80 0.81   ANIONGAP 10 8 10   TAYLER 9.2 9.2 8.9   GLC 98 92 98    Most Recent 2 LFT's:  Recent Labs   Lab Test 12/29/22  0942 12/07/22  0925   AST 32 30   ALT 34 33   ALKPHOS 49 52   BILITOTAL 0.4 0.3      I reviewed the above labs today.      IMAGING  CT CHEST/ABDOMEN/PELVIS W CONTRAST 1/16/2023 11:17 AM     CLINICAL HISTORY: Metastatic breast cancer (H)     TECHNIQUE: CT scan of the chest, abdomen, and pelvis was performed  following injection of IV contrast. Multiplanar reformats were  obtained. Dose reduction techniques were used.   CONTRAST: 84ml Isovue 370     COMPARISON: 9/12/2022     FINDINGS:   LUNGS AND PLEURA: Stable pulmonary nodules. For example, a right  middle lobe 6 mm nodule (series 4, image 192) and 6 mm left lower lobe  basilar nodule (series 4, image 207) are stable. No new or enlarging  nodules. No infiltrate or pleural effusion.     MEDIASTINUM/AXILLAE: No lymphadenopathy. Left mastectomy. Right IJ  port catheter tip in the low SVC. No thoracic aortic aneurysm. Small  hiatal hernia. No coronary artery calcifications.     HEPATOBILIARY: Mild hepatic steatosis. No focal hepatic masses.  Cholecystectomy.     PANCREAS: Normal.     SPLEEN: Normal.     ADRENAL GLANDS: Normal.     KIDNEYS/BLADDER: Normal.     BOWEL: No small bowel or colonic obstruction or inflammatory changes.     PELVIC ORGANS: Myomatous uterus, stable. No adnexal masses.     ADDITIONAL FINDINGS: No lymphadenopathy in the abdomen and pelvis. No  ascites. No abdominal aortic aneurysm.     MUSCULOSKELETAL: Multiple sclerotic lesions of the bones are stable.                                                                      IMPRESSION:  1.  Stable appearance of sclerotic osseous  lesions.  2.  Stable small pulmonary nodules.  3.  No new disease in the chest, abdomen and pelvis.     MARYANNE FUNEZ MD        ECHO 1/16  Interpretation Summary     1. Normal biventricular size and function. Left ventricular ejection fraction  of 60-65%. Global longitudnal strain is normal at - 19 %.  2. No segmental wall motion abnormalities noted.  3. No hemodynamically significant valvular disease.  No change when compared to prior study. Technically adequate study.        ASSESSMENT AND PLAN:     1.  Hoda Brush is a 67 year old   woman with a history of ER-positive, PA-positive, HER2 positive breast cancer.  She is from [facility], formally from [facility], and came to live in the [place] with [place].  The tumor is ER positive, PA positive and HER2 positive.  She had metastatic disease at the time of presentation with bone-only metastases by report.  She underwent neoadjuvant CAF, had a left mastectomy, left axillary lymph node dissection, radiation to the right hip, which included the right iliac region where she had metastatic disease.  She was initially on tamoxifen but then was switched to letrozole.  She continues on this and every 3-week trastuzumab. She has had no evidence of disease progression for the last 3 years.  We have continued Herceptin every 3 weeks as well as daily letrozole.   -- CT CAP 1/16 stable Tumor markers stable   -- Proceed with herceptin on today. Continue letrozole   -- Echo every 3 months-- reviewed from 1/16, no changes in EF. Repeat 4/2023  -- Provider visit every 6 weeks     2. Bone health  - Xgeva was on hold due to possible ONJ. See Dr. Aguiar's note from 12/16/2021--this was resumed on 1/6/22. To be given every 3 months--this was preioulsy confirmed with Dr. Aguiar.   -Currently no dental issues, Xgeva due next month       3. Vaccines  - Up to date on Flu and Covid       Patient will call in the interim with any questions or concerns. They voice understanding and  agree with the above plan.     Jamee Mckeon PA-C

## 2023-01-20 NOTE — NURSING NOTE
"Oncology Rooming Note    January 20, 2023 9:02 AM   Hoda Brush is a 67 year old female who presents for:    Chief Complaint   Patient presents with     Port Draw     Labs drawn via port by RN in lab. VS taken.      Oncology Clinic Visit     Malignant neoplasm of left female breast     Initial Vitals: /68 (BP Location: Right arm, Patient Position: Sitting, Cuff Size: Adult Regular)   Pulse 79   Temp 97.9  F (36.6  C) (Oral)   Resp 18   Wt 79.3 kg (174 lb 12.8 oz)   SpO2 95%   BMI 31.21 kg/m   Estimated body mass index is 31.21 kg/m  as calculated from the following:    Height as of 9/12/22: 1.594 m (5' 2.75\").    Weight as of this encounter: 79.3 kg (174 lb 12.8 oz). Body surface area is 1.87 meters squared.  No Pain (0) Comment: Data Unavailable   No LMP recorded. Patient is postmenopausal.  Allergies reviewed: Yes  Medications reviewed: Yes    Medications: Medication refills not needed today.  Pharmacy name entered into [facility]: [id number] - [place], [place] - [address] AT [address] [place] [address]    Clinical concerns: NONE       Tamika Medina EMT            "

## 2023-01-20 NOTE — NURSING NOTE
Chief Complaint   Patient presents with     Port Draw     Labs drawn via port by RN in lab. VS taken.      Port accessed and labs drawn by RN. Pt tolerated well.  VS taken.  Pt checked in for next appt.    Ashlyn Wilson RN

## 2023-01-21 LAB — CANCER AG27-29 SERPL-ACNC: <9 U/ML

## 2023-01-23 ENCOUNTER — HEALTH MAINTENANCE LETTER (OUTPATIENT)
Age: 68
End: 2023-01-23

## 2023-02-08 DIAGNOSIS — C50.912 MALIGNANT NEOPLASM OF LEFT FEMALE BREAST, UNSPECIFIED ESTROGEN RECEPTOR STATUS, UNSPECIFIED SITE OF BREAST (H): ICD-10-CM

## 2023-02-08 DIAGNOSIS — C79.51 BONE METASTASIS: Primary | ICD-10-CM

## 2023-02-08 RX ORDER — DIPHENHYDRAMINE HYDROCHLORIDE 50 MG/ML
50 INJECTION INTRAMUSCULAR; INTRAVENOUS
Status: CANCELLED
Start: 2023-02-10

## 2023-02-08 RX ORDER — LORAZEPAM 2 MG/ML
0.5 INJECTION INTRAMUSCULAR EVERY 4 HOURS PRN
Status: CANCELLED
Start: 2023-02-10

## 2023-02-08 RX ORDER — ACETAMINOPHEN 325 MG/1
650 TABLET ORAL
Status: CANCELLED | OUTPATIENT
Start: 2023-02-10

## 2023-02-08 RX ORDER — METHYLPREDNISOLONE SODIUM SUCCINATE 125 MG/2ML
125 INJECTION, POWDER, LYOPHILIZED, FOR SOLUTION INTRAMUSCULAR; INTRAVENOUS
Status: CANCELLED
Start: 2023-02-10

## 2023-02-08 RX ORDER — DIPHENHYDRAMINE HCL 25 MG
50 CAPSULE ORAL ONCE
Status: CANCELLED
Start: 2023-02-10

## 2023-02-08 RX ORDER — ALBUTEROL SULFATE 0.83 MG/ML
2.5 SOLUTION RESPIRATORY (INHALATION)
Status: CANCELLED | OUTPATIENT
Start: 2023-02-10

## 2023-02-08 RX ORDER — EPINEPHRINE 0.3 MG/.3ML
0.3 INJECTION SUBCUTANEOUS EVERY 5 MIN PRN
Status: CANCELLED | OUTPATIENT
Start: 2023-02-10

## 2023-02-08 RX ORDER — EPINEPHRINE 1 MG/ML
0.3 INJECTION, SOLUTION INTRAMUSCULAR; SUBCUTANEOUS EVERY 5 MIN PRN
Status: CANCELLED | OUTPATIENT
Start: 2023-02-10

## 2023-02-08 RX ORDER — HEPARIN SODIUM (PORCINE) LOCK FLUSH IV SOLN 100 UNIT/ML 100 UNIT/ML
5 SOLUTION INTRAVENOUS EVERY 8 HOURS
Status: CANCELLED | OUTPATIENT
Start: 2023-02-10

## 2023-02-08 RX ORDER — SODIUM CHLORIDE 9 MG/ML
1000 INJECTION, SOLUTION INTRAVENOUS CONTINUOUS PRN
Status: CANCELLED
Start: 2023-02-10

## 2023-02-08 RX ORDER — ALBUTEROL SULFATE 90 UG/1
1-2 AEROSOL, METERED RESPIRATORY (INHALATION)
Status: CANCELLED
Start: 2023-02-10

## 2023-02-09 ENCOUNTER — INFUSION THERAPY VISIT (OUTPATIENT)
Dept: ONCOLOGY | Facility: CLINIC | Age: 68
End: 2023-02-09
Attending: PHYSICIAN ASSISTANT
Payer: COMMERCIAL

## 2023-02-09 ENCOUNTER — APPOINTMENT (OUTPATIENT)
Dept: LAB | Facility: CLINIC | Age: 68
End: 2023-02-09
Attending: PHYSICIAN ASSISTANT
Payer: COMMERCIAL

## 2023-02-09 VITALS
SYSTOLIC BLOOD PRESSURE: 131 MMHG | OXYGEN SATURATION: 98 % | BODY MASS INDEX: 31.39 KG/M2 | RESPIRATION RATE: 16 BRPM | HEART RATE: 81 BPM | TEMPERATURE: 97.5 F | DIASTOLIC BLOOD PRESSURE: 73 MMHG | WEIGHT: 175.8 LBS

## 2023-02-09 DIAGNOSIS — C50.912 MALIGNANT NEOPLASM OF LEFT FEMALE BREAST, UNSPECIFIED ESTROGEN RECEPTOR STATUS, UNSPECIFIED SITE OF BREAST (H): Primary | ICD-10-CM

## 2023-02-09 DIAGNOSIS — C79.51 BONE METASTASIS: ICD-10-CM

## 2023-02-09 LAB
ALBUMIN SERPL BCG-MCNC: 4 G/DL (ref 3.5–5.2)
ALP SERPL-CCNC: 50 U/L (ref 35–104)
ALT SERPL W P-5'-P-CCNC: 27 U/L (ref 10–35)
ANION GAP SERPL CALCULATED.3IONS-SCNC: 8 MMOL/L (ref 7–15)
AST SERPL W P-5'-P-CCNC: 30 U/L (ref 10–35)
BASOPHILS # BLD AUTO: 0 10E3/UL (ref 0–0.2)
BASOPHILS NFR BLD AUTO: 1 %
BILIRUB SERPL-MCNC: 0.4 MG/DL
BUN SERPL-MCNC: 12.2 MG/DL (ref 8–23)
CALCIUM SERPL-MCNC: 9.3 MG/DL (ref 8.8–10.2)
CEA SERPL-MCNC: 0.8 NG/ML
CHLORIDE SERPL-SCNC: 105 MMOL/L (ref 98–107)
CREAT SERPL-MCNC: 0.8 MG/DL (ref 0.51–0.95)
DEPRECATED HCO3 PLAS-SCNC: 29 MMOL/L (ref 22–29)
EOSINOPHIL # BLD AUTO: 0.3 10E3/UL (ref 0–0.7)
EOSINOPHIL NFR BLD AUTO: 4 %
ERYTHROCYTE [DISTWIDTH] IN BLOOD BY AUTOMATED COUNT: 13.5 % (ref 10–15)
GFR SERPL CREATININE-BSD FRML MDRD: 81 ML/MIN/1.73M2
GLUCOSE SERPL-MCNC: 86 MG/DL (ref 70–99)
HCT VFR BLD AUTO: 39.1 % (ref 35–47)
HGB BLD-MCNC: 12.3 G/DL (ref 11.7–15.7)
IMM GRANULOCYTES # BLD: 0 10E3/UL
IMM GRANULOCYTES NFR BLD: 0 %
LYMPHOCYTES # BLD AUTO: 2.6 10E3/UL (ref 0.8–5.3)
LYMPHOCYTES NFR BLD AUTO: 40 %
MCH RBC QN AUTO: 29.4 PG (ref 26.5–33)
MCHC RBC AUTO-ENTMCNC: 31.5 G/DL (ref 31.5–36.5)
MCV RBC AUTO: 94 FL (ref 78–100)
MONOCYTES # BLD AUTO: 0.4 10E3/UL (ref 0–1.3)
MONOCYTES NFR BLD AUTO: 6 %
NEUTROPHILS # BLD AUTO: 3.2 10E3/UL (ref 1.6–8.3)
NEUTROPHILS NFR BLD AUTO: 49 %
NRBC # BLD AUTO: 0 10E3/UL
NRBC BLD AUTO-RTO: 0 /100
PLATELET # BLD AUTO: 220 10E3/UL (ref 150–450)
POTASSIUM SERPL-SCNC: 4.4 MMOL/L (ref 3.4–5.3)
PROT SERPL-MCNC: 7.5 G/DL (ref 6.4–8.3)
RBC # BLD AUTO: 4.18 10E6/UL (ref 3.8–5.2)
SODIUM SERPL-SCNC: 142 MMOL/L (ref 136–145)
WBC # BLD AUTO: 6.6 10E3/UL (ref 4–11)

## 2023-02-09 PROCEDURE — 250N000011 HC RX IP 250 OP 636: Performed by: INTERNAL MEDICINE

## 2023-02-09 PROCEDURE — 96372 THER/PROPH/DIAG INJ SC/IM: CPT | Mod: 59 | Performed by: INTERNAL MEDICINE

## 2023-02-09 PROCEDURE — 96372 THER/PROPH/DIAG INJ SC/IM: CPT | Performed by: INTERNAL MEDICINE

## 2023-02-09 PROCEDURE — 82378 CARCINOEMBRYONIC ANTIGEN: CPT | Performed by: PHYSICIAN ASSISTANT

## 2023-02-09 PROCEDURE — 85025 COMPLETE CBC W/AUTO DIFF WBC: CPT | Performed by: PHYSICIAN ASSISTANT

## 2023-02-09 PROCEDURE — 80053 COMPREHEN METABOLIC PANEL: CPT | Performed by: PHYSICIAN ASSISTANT

## 2023-02-09 PROCEDURE — 258N000003 HC RX IP 258 OP 636: Performed by: INTERNAL MEDICINE

## 2023-02-09 PROCEDURE — 86300 IMMUNOASSAY TUMOR CA 15-3: CPT | Performed by: PHYSICIAN ASSISTANT

## 2023-02-09 PROCEDURE — 96413 CHEMO IV INFUSION 1 HR: CPT

## 2023-02-09 PROCEDURE — 36591 DRAW BLOOD OFF VENOUS DEVICE: CPT | Performed by: PHYSICIAN ASSISTANT

## 2023-02-09 PROCEDURE — 250N000011 HC RX IP 250 OP 636: Performed by: PHYSICIAN ASSISTANT

## 2023-02-09 RX ORDER — HEPARIN SODIUM (PORCINE) LOCK FLUSH IV SOLN 100 UNIT/ML 100 UNIT/ML
5 SOLUTION INTRAVENOUS EVERY 8 HOURS
Status: DISCONTINUED | OUTPATIENT
Start: 2023-02-09 | End: 2023-02-09 | Stop reason: HOSPADM

## 2023-02-09 RX ORDER — HEPARIN SODIUM (PORCINE) LOCK FLUSH IV SOLN 100 UNIT/ML 100 UNIT/ML
5 SOLUTION INTRAVENOUS ONCE
Status: COMPLETED | OUTPATIENT
Start: 2023-02-09 | End: 2023-02-09

## 2023-02-09 RX ADMIN — Medication 5 ML: at 10:23

## 2023-02-09 RX ADMIN — Medication 5 ML: at 12:42

## 2023-02-09 RX ADMIN — SODIUM CHLORIDE 250 ML: 9 INJECTION, SOLUTION INTRAVENOUS at 12:05

## 2023-02-09 RX ADMIN — DENOSUMAB 120 MG: 120 INJECTION SUBCUTANEOUS at 11:57

## 2023-02-09 RX ADMIN — TRASTUZUMAB 450 MG: 150 INJECTION, POWDER, LYOPHILIZED, FOR SOLUTION INTRAVENOUS at 12:06

## 2023-02-09 ASSESSMENT — PAIN SCALES - GENERAL: PAINLEVEL: NO PAIN (0)

## 2023-02-09 NOTE — PROGRESS NOTES
Infusion Nursing Note:  Hoda Brush presents today for Cycle 93 Day 1 Trastuzumab and Xgeva.    Patient seen by provider today: No   present during visit today:  Patient declined an .  A waiver has been signed.    Note: Patient states she has been doing well.  She says she feels good and offers no new concerns at this time.    Intravenous Access:  Implanted Port.    Treatment Conditions:  Lab Results   Component Value Date    HGB 12.3 02/09/2023    WBC 6.6 02/09/2023    ANEU 3.1 06/22/2021    ANEUTAUTO 3.2 02/09/2023     02/09/2023      Lab Results   Component Value Date     02/09/2023    POTASSIUM 4.4 02/09/2023    CR 0.80 02/09/2023    TAYLER 9.3 02/09/2023    BILITOTAL 0.4 02/09/2023    ALBUMIN 4.0 02/09/2023    ALT 27 02/09/2023    AST 30 02/09/2023     Results reviewed, labs MET treatment parameters, ok to proceed with treatment.    Post Infusion Assessment:  Patient tolerated infusion without incident.  Patient tolerated injection without incident.  Xgeva administered subcutaneously into the back of patient's right arm by Jonathan Sánchez clinical assistant with supervision by RN.  Blood return noted pre and post infusion.  Site patent and intact, free from redness, edema or discomfort.  No evidence of extravasations.  Access discontinued per protocol.     Discharge Plan:   Patient declined prescription refills.  Discharge instructions reviewed with: Patient.  Patient and/or family verbalized understanding of discharge instructions and all questions answered.  AVS to patient via [employer].  Patient will return 3/2/23 for next appointment.   Patient discharged in stable condition accompanied by: self.  Departure Mode: Ambulatory.  Face to Face time: 0.      ROLA BARBA RN

## 2023-02-11 LAB — CANCER AG27-29 SERPL-ACNC: <9 U/ML

## 2023-02-26 NOTE — PROGRESS NOTES
Hoda is a patient from [facility] and is seen here for continuation of care for her metastatic ER+HER2- breast cancer.  She is a refugee from [facility] and was initially seen in clinic with her daughter.  The only data we have from [facility] is an English translation of her records.       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  On 02/11/2014, she was seen by an oncologist.  The clinical staging of T4b N2 M1 was noted.   Her breast cancer was on the left side. There was no right breast cancer, confirmed by the patient and her daughter, correcting a possible error in the translated records.  ER was positive in 100% of the cells, AZ at 14% and HER2 was 3+ positive.  It appears that this histopathologic information is on the mastectomy specimen. She underwent an MRI for staging of presumptive bone metastases which was performed 03/02/2014.  There were skeletal metastases in the thoracic vertebrae at 12, L1, L3, L5 and S1 vertebral body, ranging in size from 0.7-3.0 cm in size.  Ischial and right femur were also involved, as well as a large iliac mass measuring about 15 cm in the uterus. 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   Radiation Oncology consultation was performed 03/11/2014, and she was given radiation in 1 dose of 6 Gy to the right iliac lesion.        With the initial diagnosis, she initiated treatment with 6 cycles of CAF neoadjuvant chemotherapy 02/14, 07/07, 03/28, 04/18/14, 05/08 and 05/29/14. She also received monthly zoledronic acid.  She then underwent a modified left mastectomy of Palacios type and left lymphadenoectomy on 06/27/2014.   Pathologic examination showed number at [facility] was [id number] showed infiltrative grade 2 ductal cancer with 6 level 1 metastatic lympFollow up with Jossie for visits and trastuzumab on 2-5, 2-26, 3-19 with CBC, CMP.  Echocardiogram on 3-19.  Follow up with me 4-9 with CBC,  CMP, CA27.29 and CEA and with CT CAP on 4-8.h nodes and 3 level 2 metastatic lymph nodes.  The differentiation was intermediate.  The tumor was ER positive 70% of the cells, UT positive in 40% of the cells and HER2 was 3+ by immunohistochemistry and the Ki-67 labeling index was 20%. Her staging after surgery was stage IV, pT4b N2 M1.  I don't see a biopsy of the skeletal metastases.  She then had continuation with chemotherapy with 4 cycles of Herceptin and taxane with monthly zoledronic acid.  Tamoxifen was initiated.  She then had two years of Herceptin and a decision was made not to continue further Herceptin.  She continued on zoledronic acid every 3 months. She was clinically stable. [place] then moved to the [place] new refugee from [facility].  She has now been changed to letrozole.  Denosumab has now been held for new diagnosis of osteonecrosis of the R maxilla.     History of cholecystectomy 2020.      TREATMENT HISTORY:  A. Initial diagnosis with metastatic breast cancer in [facility].  Neoadjuvant CAF x 6.   B. Left mastectomy. Left axillary node dissection.  C.  Radiation in 1 dose to R iliac region.  C. Herceptin for 2 years taxane for a prescribed course then stopped, monthly zoledronic acid.  She had 2 years of Herceptin with tamoxifen added after chemotherapy.  D.  Tamoxifen alone and zoledronic acid every 3 months starting 2014.  Letrozole was started   E.  [place]  We restarted Herceptin every 3 weeks and continued tamoxifen. Bone targeted agent changed to denosumab every 9 weeks. Zometa discontinued 2017.  Tamoxifen was changed to letrozole August 2019.    F.  Xgeva discontinued 12-17-19 because of dental issues.   G.  J+J vaccine March, 2021.  H.  Was on Zometa once May 11.  Reaction with eye lid swelling. Discontinued Zometal  H.  Restart Xgeva 6-22-21.  Continuing the trastuzumab and letrozole.  Both tolerated well.       INTERVAL HISTORY   Hoda returns to clinic and is doing quite well.  She has  no pain, fatigue, depression or anxiety.  She did have a dental procedure but the maxillary tooth problem is now resolved and she has an upper plate in place.  She is now cleared by her dentist to have further Xgeva every 3 months.    REVIEW OF SYSTEMS:  A 10-point review of systems is negative.    She has no further gallbladder issues, no right upper quadrant pain and no abdominal discomfort with fatty meals.  She is eating a healthful diet, Mediterranean in style.  She is not consuming alcohol.  She exercises regularly.    She is taking vitamin D 2000 International Units daily.     PHYSICAL EXAMINATION:    GENERAL:  Hoda appeared generally well.  VITAL SIGNS:  /72 (BP Location: Right arm, Patient Position: Sitting, Cuff Size: Adult Regular)   Pulse 66   Temp 98  F (36.7  C) (Oral)   Resp 18   Wt 81.5 kg (179 lb 11.2 oz)   SpO2 95%   BMI 32.09 kg/m    HEENT:  There is no alopecia.  Examination of the oropharynx is without lesions.  She has an upper plate in place.  The remainder of her dentition looks good.  LYMPHATIC:  She has no cervical, supraclavicular, subclavicular or axillary lymphadenopathy.  CHEST:  Examination of right breast reveals no masses.  Examination of the left breast reveals well-healed mastectomy incision without erythema or masses.  The port site in the right upper chest is without erythema or masses.  LUNGS:  Clear to percussion and auscultation.  HEART:  Regular rate and rhythm.  S1, S2.  ABDOMEN:  Soft, nontender, without hepatosplenomegaly.  EXTREMITIES:  Without edema.  PSYCHIATRIC:  Mood and affect were normal.     LABORATORY DATA:  CMP and CBC within normal limits.       IMAGING:  CT CAP   01/16/2023  IMAGING:  CT scan of chest, abdomen and pelvis 01/16/2023 shows metastatic disease with no evidence of progression.  Examination of the lung and pleura shows stable pulmonary nodules.  There is a right middle lobe 6 mm nodule and a 6 mm left lower lobe nodule which are stable  and there are no new or enlarging nodules and no pleural effusions.  She has bone-only disease.  Echocardiogram from 01/16/2023 showed an ejection fraction of 60%-65%.           ASSESSMENT AND PLAN:     1.  Hoda Brush is a 68-year-old woman with a history of ER-positive, ID-positive, HER2 positive breast cancer.  She is from [facility], formally from [facility], and came to live in the [place] with [place].  The tumor is ER positive, ID positive and HER2 positive.  She had metastatic disease at the time of presentation with bone-only metastases by report.  She underwent neoadjuvant CAF, had a left mastectomy, left axillary lymph node dissection, radiation to the right hip, which included the right iliac region where she had metastatic disease.  She was initially on tamoxifen but then was switched to letrozole.  She continues on this and every 3-week trastuzumab.  She has had no evidence of disease progression for the last 3 years.  Markers are low and stable.  We have continued Herceptin every 3 weeks as well as daily letrozole. CT CAP shows stable pulmonary nodules.   2.  Hoda Brush continues to do well on a combination of trastuzumab and letrozole. She has no evidence of disease progression.   Her ejection fraction remains stable.  The right maxillary osteonecrosis has healed and she is able to continue with Xgeva every 3 months.  All of her questions were answered.She continues to do well on her regimen of trastuzumab and letrozole.  She has no joint discomfort with the letrozole and no hot flashes.  Her CT scan is stable and the plan was to repeat the CT scan 4 months.  3.  Continue the letrozole.  Letrozole has been well-tolerated.  No significant joint stiffness.  4. COVID vaccination was recommended with a booster.  She has had Kelton and Kelton and 2 boosters prior, but the last booster was more than 4 months ago.  All of her questions were answered.  4.  Echo. Stable EF. --Echo in   showed normal EF with slight decline 55-60%.    5.  Discussion of bone health and right maxillary osteonecrosis.  All healed.   Xgeva is being continued.   6. Follow up. Herceptin 3-23 with CBC, CMP. CT CAP and echo April 12. Follow up with flor Yi April 13 with CBC, CMP,  and CEA and Herceptin. Herceptin May 2 with CBC, CMP. Follow up with me May 23 with CBC, CMP, , CEA and Herceptin.      Thank you for allowing us to participate in this patient's care.      Sincerely,     Lisandro Aguiar M.D.   Professor   Division of Hematology, Oncology, Transplant   Department of Medicine   [employer]   [phone]        I spent 35 minutes with the patient more than 50% of which was in counseling and coordination of care.

## 2023-03-02 ENCOUNTER — INFUSION THERAPY VISIT (OUTPATIENT)
Dept: ONCOLOGY | Facility: CLINIC | Age: 68
End: 2023-03-02
Attending: INTERNAL MEDICINE
Payer: COMMERCIAL

## 2023-03-02 ENCOUNTER — APPOINTMENT (OUTPATIENT)
Dept: LAB | Facility: CLINIC | Age: 68
End: 2023-03-02
Attending: INTERNAL MEDICINE
Payer: COMMERCIAL

## 2023-03-02 VITALS
HEART RATE: 66 BPM | DIASTOLIC BLOOD PRESSURE: 72 MMHG | OXYGEN SATURATION: 95 % | BODY MASS INDEX: 32.09 KG/M2 | RESPIRATION RATE: 18 BRPM | TEMPERATURE: 98 F | WEIGHT: 179.7 LBS | SYSTOLIC BLOOD PRESSURE: 127 MMHG

## 2023-03-02 DIAGNOSIS — C50.912 MALIGNANT NEOPLASM OF LEFT FEMALE BREAST, UNSPECIFIED ESTROGEN RECEPTOR STATUS, UNSPECIFIED SITE OF BREAST (H): Primary | ICD-10-CM

## 2023-03-02 DIAGNOSIS — Z51.11 ENCOUNTER FOR ANTINEOPLASTIC CHEMOTHERAPY: ICD-10-CM

## 2023-03-02 LAB
ALBUMIN SERPL BCG-MCNC: 4.1 G/DL (ref 3.5–5.2)
ALP SERPL-CCNC: 46 U/L (ref 35–104)
ALT SERPL W P-5'-P-CCNC: 34 U/L (ref 10–35)
ANION GAP SERPL CALCULATED.3IONS-SCNC: 9 MMOL/L (ref 7–15)
AST SERPL W P-5'-P-CCNC: 32 U/L (ref 10–35)
BASOPHILS # BLD AUTO: 0 10E3/UL (ref 0–0.2)
BASOPHILS NFR BLD AUTO: 1 %
BILIRUB SERPL-MCNC: 0.4 MG/DL
BUN SERPL-MCNC: 14.7 MG/DL (ref 8–23)
CALCIUM SERPL-MCNC: 9.3 MG/DL (ref 8.8–10.2)
CEA SERPL-MCNC: 0.9 NG/ML
CHLORIDE SERPL-SCNC: 104 MMOL/L (ref 98–107)
CREAT SERPL-MCNC: 0.76 MG/DL (ref 0.51–0.95)
DEPRECATED HCO3 PLAS-SCNC: 28 MMOL/L (ref 22–29)
EOSINOPHIL # BLD AUTO: 0.2 10E3/UL (ref 0–0.7)
EOSINOPHIL NFR BLD AUTO: 3 %
ERYTHROCYTE [DISTWIDTH] IN BLOOD BY AUTOMATED COUNT: 13.9 % (ref 10–15)
GFR SERPL CREATININE-BSD FRML MDRD: 85 ML/MIN/1.73M2
GLUCOSE SERPL-MCNC: 98 MG/DL (ref 70–99)
HCT VFR BLD AUTO: 38.9 % (ref 35–47)
HGB BLD-MCNC: 12.3 G/DL (ref 11.7–15.7)
IMM GRANULOCYTES # BLD: 0 10E3/UL
IMM GRANULOCYTES NFR BLD: 0 %
LYMPHOCYTES # BLD AUTO: 2.5 10E3/UL (ref 0.8–5.3)
LYMPHOCYTES NFR BLD AUTO: 38 %
MCH RBC QN AUTO: 29.4 PG (ref 26.5–33)
MCHC RBC AUTO-ENTMCNC: 31.6 G/DL (ref 31.5–36.5)
MCV RBC AUTO: 93 FL (ref 78–100)
MONOCYTES # BLD AUTO: 0.5 10E3/UL (ref 0–1.3)
MONOCYTES NFR BLD AUTO: 7 %
NEUTROPHILS # BLD AUTO: 3.3 10E3/UL (ref 1.6–8.3)
NEUTROPHILS NFR BLD AUTO: 51 %
NRBC # BLD AUTO: 0 10E3/UL
NRBC BLD AUTO-RTO: 0 /100
PLATELET # BLD AUTO: 183 10E3/UL (ref 150–450)
POTASSIUM SERPL-SCNC: 4.3 MMOL/L (ref 3.4–5.3)
PROT SERPL-MCNC: 7.5 G/DL (ref 6.4–8.3)
RBC # BLD AUTO: 4.18 10E6/UL (ref 3.8–5.2)
SODIUM SERPL-SCNC: 141 MMOL/L (ref 136–145)
WBC # BLD AUTO: 6.6 10E3/UL (ref 4–11)

## 2023-03-02 PROCEDURE — 99214 OFFICE O/P EST MOD 30 MIN: CPT | Performed by: INTERNAL MEDICINE

## 2023-03-02 PROCEDURE — 86300 IMMUNOASSAY TUMOR CA 15-3: CPT | Performed by: INTERNAL MEDICINE

## 2023-03-02 PROCEDURE — 258N000003 HC RX IP 258 OP 636: Performed by: INTERNAL MEDICINE

## 2023-03-02 PROCEDURE — 82378 CARCINOEMBRYONIC ANTIGEN: CPT | Performed by: INTERNAL MEDICINE

## 2023-03-02 PROCEDURE — 96413 CHEMO IV INFUSION 1 HR: CPT

## 2023-03-02 PROCEDURE — 250N000011 HC RX IP 250 OP 636: Performed by: INTERNAL MEDICINE

## 2023-03-02 PROCEDURE — 36591 DRAW BLOOD OFF VENOUS DEVICE: CPT | Performed by: INTERNAL MEDICINE

## 2023-03-02 PROCEDURE — G0463 HOSPITAL OUTPT CLINIC VISIT: HCPCS | Mod: 25 | Performed by: INTERNAL MEDICINE

## 2023-03-02 PROCEDURE — 80053 COMPREHEN METABOLIC PANEL: CPT | Performed by: INTERNAL MEDICINE

## 2023-03-02 PROCEDURE — 85025 COMPLETE CBC W/AUTO DIFF WBC: CPT | Performed by: INTERNAL MEDICINE

## 2023-03-02 RX ORDER — ALBUTEROL SULFATE 90 UG/1
1-2 AEROSOL, METERED RESPIRATORY (INHALATION)
Status: CANCELLED
Start: 2023-03-04

## 2023-03-02 RX ORDER — DIPHENHYDRAMINE HCL 25 MG
50 CAPSULE ORAL ONCE
Status: CANCELLED
Start: 2023-03-04

## 2023-03-02 RX ORDER — ALBUTEROL SULFATE 90 UG/1
1-2 AEROSOL, METERED RESPIRATORY (INHALATION)
Status: CANCELLED
Start: 2023-03-25

## 2023-03-02 RX ORDER — LORAZEPAM 2 MG/ML
0.5 INJECTION INTRAMUSCULAR EVERY 4 HOURS PRN
Status: CANCELLED
Start: 2023-03-25

## 2023-03-02 RX ORDER — HEPARIN SODIUM (PORCINE) LOCK FLUSH IV SOLN 100 UNIT/ML 100 UNIT/ML
5 SOLUTION INTRAVENOUS ONCE
Status: COMPLETED | OUTPATIENT
Start: 2023-03-02 | End: 2023-03-02

## 2023-03-02 RX ORDER — ALBUTEROL SULFATE 0.83 MG/ML
2.5 SOLUTION RESPIRATORY (INHALATION)
Status: CANCELLED | OUTPATIENT
Start: 2023-03-25

## 2023-03-02 RX ORDER — ALBUTEROL SULFATE 0.83 MG/ML
2.5 SOLUTION RESPIRATORY (INHALATION)
Status: CANCELLED | OUTPATIENT
Start: 2023-03-04

## 2023-03-02 RX ORDER — DIPHENHYDRAMINE HYDROCHLORIDE 50 MG/ML
50 INJECTION INTRAMUSCULAR; INTRAVENOUS
Status: CANCELLED
Start: 2023-03-25

## 2023-03-02 RX ORDER — DIPHENHYDRAMINE HYDROCHLORIDE 50 MG/ML
50 INJECTION INTRAMUSCULAR; INTRAVENOUS
Status: CANCELLED
Start: 2023-03-04

## 2023-03-02 RX ORDER — EPINEPHRINE 0.3 MG/.3ML
0.3 INJECTION SUBCUTANEOUS EVERY 5 MIN PRN
Status: CANCELLED | OUTPATIENT
Start: 2023-03-04

## 2023-03-02 RX ORDER — EPINEPHRINE 1 MG/ML
0.3 INJECTION, SOLUTION INTRAMUSCULAR; SUBCUTANEOUS EVERY 5 MIN PRN
Status: CANCELLED | OUTPATIENT
Start: 2023-03-04

## 2023-03-02 RX ORDER — LORAZEPAM 2 MG/ML
0.5 INJECTION INTRAMUSCULAR EVERY 4 HOURS PRN
Status: CANCELLED
Start: 2023-03-04

## 2023-03-02 RX ORDER — ACETAMINOPHEN 325 MG/1
650 TABLET ORAL
Status: CANCELLED | OUTPATIENT
Start: 2023-03-25

## 2023-03-02 RX ORDER — METHYLPREDNISOLONE SODIUM SUCCINATE 125 MG/2ML
125 INJECTION, POWDER, LYOPHILIZED, FOR SOLUTION INTRAMUSCULAR; INTRAVENOUS
Status: CANCELLED
Start: 2023-03-04

## 2023-03-02 RX ORDER — SODIUM CHLORIDE 9 MG/ML
1000 INJECTION, SOLUTION INTRAVENOUS CONTINUOUS PRN
Status: CANCELLED
Start: 2023-03-25

## 2023-03-02 RX ORDER — ACETAMINOPHEN 325 MG/1
650 TABLET ORAL
Status: CANCELLED | OUTPATIENT
Start: 2023-03-04

## 2023-03-02 RX ORDER — DIPHENHYDRAMINE HCL 25 MG
50 CAPSULE ORAL ONCE
Status: CANCELLED
Start: 2023-03-25

## 2023-03-02 RX ORDER — HEPARIN SODIUM (PORCINE) LOCK FLUSH IV SOLN 100 UNIT/ML 100 UNIT/ML
5 SOLUTION INTRAVENOUS EVERY 8 HOURS
Status: CANCELLED | OUTPATIENT
Start: 2023-03-25

## 2023-03-02 RX ORDER — HEPARIN SODIUM (PORCINE) LOCK FLUSH IV SOLN 100 UNIT/ML 100 UNIT/ML
5 SOLUTION INTRAVENOUS EVERY 8 HOURS
Status: CANCELLED | OUTPATIENT
Start: 2023-03-04

## 2023-03-02 RX ORDER — EPINEPHRINE 1 MG/ML
0.3 INJECTION, SOLUTION INTRAMUSCULAR; SUBCUTANEOUS EVERY 5 MIN PRN
Status: CANCELLED | OUTPATIENT
Start: 2023-03-25

## 2023-03-02 RX ORDER — HEPARIN SODIUM (PORCINE) LOCK FLUSH IV SOLN 100 UNIT/ML 100 UNIT/ML
5 SOLUTION INTRAVENOUS EVERY 8 HOURS
Status: DISCONTINUED | OUTPATIENT
Start: 2023-03-02 | End: 2023-03-02 | Stop reason: HOSPADM

## 2023-03-02 RX ORDER — SODIUM CHLORIDE 9 MG/ML
1000 INJECTION, SOLUTION INTRAVENOUS CONTINUOUS PRN
Status: CANCELLED
Start: 2023-03-04

## 2023-03-02 RX ORDER — METHYLPREDNISOLONE SODIUM SUCCINATE 125 MG/2ML
125 INJECTION, POWDER, LYOPHILIZED, FOR SOLUTION INTRAMUSCULAR; INTRAVENOUS
Status: CANCELLED
Start: 2023-03-25

## 2023-03-02 RX ORDER — EPINEPHRINE 0.3 MG/.3ML
0.3 INJECTION SUBCUTANEOUS EVERY 5 MIN PRN
Status: CANCELLED | OUTPATIENT
Start: 2023-03-25

## 2023-03-02 RX ADMIN — TRASTUZUMAB 450 MG: 150 INJECTION, POWDER, LYOPHILIZED, FOR SOLUTION INTRAVENOUS at 11:11

## 2023-03-02 RX ADMIN — Medication 5 ML: at 09:19

## 2023-03-02 RX ADMIN — Medication 5 ML: at 11:13

## 2023-03-02 ASSESSMENT — PAIN SCALES - GENERAL: PAINLEVEL: NO PAIN (0)

## 2023-03-02 NOTE — LETTER
3/2/2023         RE: oHda Brush  [address]  [facility] [address]        Dear Colleague,    Thank you for referring your patient, Hoda Brush, to the [facility]. Please see a copy of my visit note below.    Hoda is a patient from [facility] and is seen here for continuation of care for her metastatic ER+HER2- breast cancer.  She is a refugee from [facility] and was initially seen in clinic with her daughter.  The only data we have from [facility] is an English translation of her records.       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  On 02/11/2014, she was seen by an oncologist.  The clinical staging of T4b N2 M1 was noted.   Her breast cancer was on the left side. There was no right breast cancer, confirmed by the patient and her daughter, correcting a possible error in the translated records.  ER was positive in 100% of the cells, VT at 14% and HER2 was 3+ positive.  It appears that this histopathologic information is on the mastectomy specimen. She underwent an MRI for staging of presumptive bone metastases which was performed 03/02/2014.  There were skeletal metastases in the thoracic vertebrae at 12, L1, L3, L5 and S1 vertebral body, ranging in size from 0.7-3.0 cm in size.  Ischial and right femur were also involved, as well as a large iliac mass measuring about 15 cm in the uterus. 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   Radiation Oncology consultation was performed 03/11/2014, and she was given radiation in 1 dose of 6 Gy to the right iliac lesion.        With the initial diagnosis, she initiated treatment with 6 cycles of CAF neoadjuvant chemotherapy 02/14, 07/07, 03/28, 04/18/14, 05/08 and 05/29/14. She also received monthly zoledronic acid.  She then underwent a modified left mastectomy of Palacios type and left lymphadenoectomy on 06/27/2014.   Pathologic  examination showed number at [facility] was [id number] showed infiltrative grade 2 ductal cancer with 6 level 1 metastatic lympFollow up with Jossie for visits and trastuzumab on 2-5, 2-26, 3-19 with CBC, CMP.  Echocardiogram on 3-19.  Follow up with me 4-9 with CBC, CMP, CA27.29 and CEA and with CT CAP on 4-8.h nodes and 3 level 2 metastatic lymph nodes.  The differentiation was intermediate.  The tumor was ER positive 70% of the cells, CO positive in 40% of the cells and HER2 was 3+ by immunohistochemistry and the Ki-67 labeling index was 20%. Her staging after surgery was stage IV, pT4b N2 M1.  I don't see a biopsy of the skeletal metastases.  She then had continuation with chemotherapy with 4 cycles of Herceptin and taxane with monthly zoledronic acid.  Tamoxifen was initiated.  She then had two years of Herceptin and a decision was made not to continue further Herceptin.  She continued on zoledronic acid every 3 months. She was clinically stable. [place] then [place] to the [place] new refugee from [facility].  She has now been changed to letrozole.  Denosumab has now been held for new diagnosis of osteonecrosis of the R maxilla.     History of cholecystectomy 2020.      TREATMENT HISTORY:  A. Initial diagnosis with metastatic breast cancer in [facility].  Neoadjuvant CAF x 6.   B. Left mastectomy. Left axillary node dissection.  C.  Radiation in 1 dose to R iliac region.  C. Herceptin for 2 years taxane for a prescribed course then stopped, monthly zoledronic acid.  She had 2 years of Herceptin with tamoxifen added after chemotherapy.  D.  Tamoxifen alone and zoledronic acid every 3 months starting 2014.  Letrozole was started   E.  [place]  We restarted Herceptin every 3 weeks and continued tamoxifen. Bone targeted agent changed to denosumab every 9 weeks. Zometa discontinued 2017.  Tamoxifen was changed to letrozole August 2019.    GIANLUCA  Xgeva discontinued 12-17-19 because of dental issues.   G.  J+J vaccine  March, 2021.  REJI.  Was on Zometa once May 11.  Reaction with eye lid swelling. Discontinued Zometal  H.  Restart Xgeva 6-22-21.  Continuing the trastuzumab and letrozole.  Both tolerated well.       INTERVAL HISTORY   Hoda returns to clinic and is doing quite well.  She has no pain, fatigue, depression or anxiety.  She did have a dental procedure but the maxillary tooth problem is now resolved and she has an upper plate in place.  She is now cleared by her dentist to have further Xgeva every 3 months.    REVIEW OF SYSTEMS:  A 10-point review of systems is negative.    She has no further gallbladder issues, no right upper quadrant pain and no abdominal discomfort with fatty meals.  She is eating a healthful diet, Mediterranean in style.  She is not consuming alcohol.  She exercises regularly.    She is taking vitamin D 2000 International Units daily.     PHYSICAL EXAMINATION:    GENERAL:  Hoda appeared generally well.  VITAL SIGNS:  /72 (BP Location: Right arm, Patient Position: Sitting, Cuff Size: Adult Regular)   Pulse 66   Temp 98  F (36.7  C) (Oral)   Resp 18   Wt 81.5 kg (179 lb 11.2 oz)   SpO2 95%   BMI 32.09 kg/m    HEENT:  There is no alopecia.  Examination of the oropharynx is without lesions.  She has an upper plate in place.  The remainder of her dentition looks good.  LYMPHATIC:  She has no cervical, supraclavicular, subclavicular or axillary lymphadenopathy.  CHEST:  Examination of right breast reveals no masses.  Examination of the left breast reveals well-healed mastectomy incision without erythema or masses.  The port site in the right upper chest is without erythema or masses.  LUNGS:  Clear to percussion and auscultation.  HEART:  Regular rate and rhythm.  S1, S2.  ABDOMEN:  Soft, nontender, without hepatosplenomegaly.  EXTREMITIES:  Without edema.  PSYCHIATRIC:  Mood and affect were normal.     LABORATORY DATA:  CMP and CBC within normal limits.       IMAGING:  CT CAP    01/16/2023  IMAGING:  CT scan of chest, abdomen and pelvis 01/16/2023 shows metastatic disease with no evidence of progression.  Examination of the lung and pleura shows stable pulmonary nodules.  There is a right middle lobe 6 mm nodule and a 6 mm left lower lobe nodule which are stable and there are no new or enlarging nodules and no pleural effusions.  She has bone-only disease.  Echocardiogram from 01/16/2023 showed an ejection fraction of 60%-65%.           ASSESSMENT AND PLAN:     1.  Hoda Brush is a 68-year-old woman with a history of ER-positive, MD-positive, HER2 positive breast cancer.  She is from [facility], formally from [facility], and came to live in the [place] with her daughter.  The tumor is ER positive, MD positive and HER2 positive.  She had metastatic disease at the time of presentation with bone-only metastases by report.  She underwent neoadjuvant CAF, had a left mastectomy, left axillary lymph node dissection, radiation to the right hip, which included the right iliac region where she had metastatic disease.  She was initially on tamoxifen but then was switched to letrozole.  She continues on this and every 3-week trastuzumab.  She has had no evidence of disease progression for the last 3 years.  Markers are low and stable.  We have continued Herceptin every 3 weeks as well as daily letrozole. CT CAP shows stable pulmonary nodules.   2.  Hoda Brush continues to do well on a combination of trastuzumab and letrozole. She has no evidence of disease progression.   Her ejection fraction remains stable.  The right maxillary osteonecrosis has healed and she is able to continue with Xgeva every 3 months.  All of her questions were answered.She continues to do well on her regimen of trastuzumab and letrozole.  She has no joint discomfort with the letrozole and no hot flashes.  Her CT scan is stable and the plan was to repeat the CT scan 4 months.  3.  Continue the letrozole.  Letrozole  has been well-tolerated.  No significant joint stiffness.  4. COVID vaccination was recommended with a booster.  She has had Kelton and Kelton and 2 boosters prior, but the last booster was more than 4 months ago.  All of her questions were answered.  4.  Echo. Stable EF. --Echo in  showed normal EF with slight decline 55-60%.    5.  Discussion of bone health and right maxillary osteonecrosis.  All healed.   Xgeva is being continued.   6. Follow up. Herceptin 3-23 with CBC, CMP. CT CAP and echo April 12. Follow up with me Kassidy April 13 with CBC, CMP,  and CEA and Herceptin. Herceptin May 2 with CBC, CMP. Follow up with me May 23 with CBC, CMP, , CEA and Herceptin.      Thank you for allowing us to participate in this patient's care.      Sincerely,     Lisandro Aguiar M.D.   Professor   Division of Hematology, Oncology, Transplant   Department of Medicine   [employer]   [phone]        I spent 35 minutes with the patient more than 50% of which was in counseling and coordination of care.

## 2023-03-02 NOTE — PROGRESS NOTES
Infusion Nursing Note:  Hoda Brush presents today for C94D1 Herceptin.    Patient seen by provider today: Yes: Dr. Aguiar prior to infusion visit   present during visit today: Not Applicable. Patient declined need for  during her infusion visit today.    Note: Hoda denied any questions or concerns following her visit with the provider prior to infusion today.    Intravenous Access:  Implanted Port.    Treatment Conditions:  Lab Results   Component Value Date    HGB 12.3 03/02/2023    WBC 6.6 03/02/2023    ANEU 3.1 06/22/2021    ANEUTAUTO 3.3 03/02/2023     03/02/2023      Lab Results   Component Value Date     03/02/2023    POTASSIUM 4.3 03/02/2023    CR 0.76 03/02/2023    TAYLER 9.3 03/02/2023    BILITOTAL 0.4 03/02/2023    ALBUMIN 4.1 03/02/2023    ALT 34 03/02/2023    AST 32 03/02/2023     Results reviewed, labs MET treatment parameters, ok to proceed with treatment.  ECHO/MUGA completed 1/16/23  EF 60-65%.    Post Infusion Assessment:  Patient tolerated infusion without incident.  Blood return noted pre and post infusion.  Site patent and intact, free from redness, edema or discomfort.  No evidence of extravasations.  Access discontinued per protocol.     Discharge Plan:   Patient declined prescription refills.  Discharge instructions reviewed with: Patient.  Patient and/or family verbalized understanding of discharge instructions and all questions answered.  AVS to patient via [facility].  Patient will return 3/23 for next appointment.   Patient discharged in stable condition accompanied by: self.  Departure Mode: Ambulatory.      Gwen Garcia RN

## 2023-03-02 NOTE — PATIENT INSTRUCTIONS
[facility] Triage and after hours / weekends / holidays:  [phone]    Please call the triage or after hours line if you experience a temperature greater than or equal to 100.4, shaking chills, have uncontrolled nausea, vomiting and/or diarrhea, dizziness, shortness of breath, chest pain, bleeding, unexplained bruising, or if you have any other new/concerning symptoms, questions or concerns.      If you are having any concerning symptoms or wish to speak to a provider before your next infusion visit, please call your care coordinator or triage to notify them so we can adequately serve you.     If you need a refill on a narcotic prescription or other medication, please call before your infusion appointment.

## 2023-03-02 NOTE — NURSING NOTE
"Oncology Rooming Note    March 2, 2023 9:41 AM   Hoda Brush is a 68 year old female who presents for:    Chief Complaint   Patient presents with     Port Draw     Labs drawn via port by RN in lab. VS taken.      Oncology Clinic Visit     Malignant neoplasm of left female breast     Initial Vitals: /72 (BP Location: Right arm, Patient Position: Sitting, Cuff Size: Adult Regular)   Pulse 66   Temp 98  F (36.7  C) (Oral)   Resp 18   Wt 81.5 kg (179 lb 11.2 oz)   SpO2 95%   BMI 32.09 kg/m   Estimated body mass index is 32.09 kg/m  as calculated from the following:    Height as of 9/12/22: 1.594 m (5' 2.75\").    Weight as of this encounter: 81.5 kg (179 lb 11.2 oz). Body surface area is 1.9 meters squared.  No Pain (0) Comment: Data Unavailable   No LMP recorded. Patient is postmenopausal.  Allergies reviewed: Yes  Medications reviewed: Yes    Medications: Medication refills not needed today.  Pharmacy name entered into [employer]: [facility] #[id number] [place], [place] - [address]    Clinical concerns: none.       Naun Sena"

## 2023-03-04 LAB — CANCER AG27-29 SERPL-ACNC: 10 U/ML

## 2023-03-23 ENCOUNTER — INFUSION THERAPY VISIT (OUTPATIENT)
Dept: ONCOLOGY | Facility: CLINIC | Age: 68
End: 2023-03-23
Attending: INTERNAL MEDICINE
Payer: COMMERCIAL

## 2023-03-23 ENCOUNTER — APPOINTMENT (OUTPATIENT)
Dept: LAB | Facility: CLINIC | Age: 68
End: 2023-03-23
Attending: INTERNAL MEDICINE
Payer: COMMERCIAL

## 2023-03-23 VITALS
DIASTOLIC BLOOD PRESSURE: 73 MMHG | BODY MASS INDEX: 32.1 KG/M2 | OXYGEN SATURATION: 98 % | HEART RATE: 75 BPM | RESPIRATION RATE: 16 BRPM | SYSTOLIC BLOOD PRESSURE: 123 MMHG | WEIGHT: 179.8 LBS | TEMPERATURE: 98 F

## 2023-03-23 DIAGNOSIS — C50.912 MALIGNANT NEOPLASM OF LEFT FEMALE BREAST, UNSPECIFIED ESTROGEN RECEPTOR STATUS, UNSPECIFIED SITE OF BREAST (H): Primary | ICD-10-CM

## 2023-03-23 LAB
ALBUMIN SERPL BCG-MCNC: 4.1 G/DL (ref 3.5–5.2)
ALP SERPL-CCNC: 45 U/L (ref 35–104)
ALT SERPL W P-5'-P-CCNC: 39 U/L (ref 10–35)
ANION GAP SERPL CALCULATED.3IONS-SCNC: 7 MMOL/L (ref 7–15)
AST SERPL W P-5'-P-CCNC: 32 U/L (ref 10–35)
BASOPHILS # BLD AUTO: 0 10E3/UL (ref 0–0.2)
BASOPHILS NFR BLD AUTO: 1 %
BILIRUB SERPL-MCNC: 0.3 MG/DL
BUN SERPL-MCNC: 13 MG/DL (ref 8–23)
CALCIUM SERPL-MCNC: 9.4 MG/DL (ref 8.8–10.2)
CEA SERPL-MCNC: 0.8 NG/ML
CHLORIDE SERPL-SCNC: 106 MMOL/L (ref 98–107)
CREAT SERPL-MCNC: 0.74 MG/DL (ref 0.51–0.95)
DEPRECATED HCO3 PLAS-SCNC: 29 MMOL/L (ref 22–29)
EOSINOPHIL # BLD AUTO: 0.2 10E3/UL (ref 0–0.7)
EOSINOPHIL NFR BLD AUTO: 3 %
ERYTHROCYTE [DISTWIDTH] IN BLOOD BY AUTOMATED COUNT: 13.9 % (ref 10–15)
GFR SERPL CREATININE-BSD FRML MDRD: 88 ML/MIN/1.73M2
GLUCOSE SERPL-MCNC: 128 MG/DL (ref 70–99)
HCT VFR BLD AUTO: 39.8 % (ref 35–47)
HGB BLD-MCNC: 12.4 G/DL (ref 11.7–15.7)
IMM GRANULOCYTES # BLD: 0 10E3/UL
IMM GRANULOCYTES NFR BLD: 0 %
LYMPHOCYTES # BLD AUTO: 2.6 10E3/UL (ref 0.8–5.3)
LYMPHOCYTES NFR BLD AUTO: 38 %
MCH RBC QN AUTO: 29.4 PG (ref 26.5–33)
MCHC RBC AUTO-ENTMCNC: 31.2 G/DL (ref 31.5–36.5)
MCV RBC AUTO: 94 FL (ref 78–100)
MONOCYTES # BLD AUTO: 0.5 10E3/UL (ref 0–1.3)
MONOCYTES NFR BLD AUTO: 7 %
NEUTROPHILS # BLD AUTO: 3.4 10E3/UL (ref 1.6–8.3)
NEUTROPHILS NFR BLD AUTO: 51 %
NRBC # BLD AUTO: 0 10E3/UL
NRBC BLD AUTO-RTO: 0 /100
PLATELET # BLD AUTO: 202 10E3/UL (ref 150–450)
POTASSIUM SERPL-SCNC: 4.2 MMOL/L (ref 3.4–5.3)
PROT SERPL-MCNC: 7.5 G/DL (ref 6.4–8.3)
RBC # BLD AUTO: 4.22 10E6/UL (ref 3.8–5.2)
SODIUM SERPL-SCNC: 142 MMOL/L (ref 136–145)
WBC # BLD AUTO: 6.7 10E3/UL (ref 4–11)

## 2023-03-23 PROCEDURE — 80053 COMPREHEN METABOLIC PANEL: CPT | Performed by: INTERNAL MEDICINE

## 2023-03-23 PROCEDURE — 82378 CARCINOEMBRYONIC ANTIGEN: CPT | Performed by: INTERNAL MEDICINE

## 2023-03-23 PROCEDURE — 86300 IMMUNOASSAY TUMOR CA 15-3: CPT

## 2023-03-23 PROCEDURE — 258N000003 HC RX IP 258 OP 636: Performed by: INTERNAL MEDICINE

## 2023-03-23 PROCEDURE — 85004 AUTOMATED DIFF WBC COUNT: CPT | Performed by: INTERNAL MEDICINE

## 2023-03-23 PROCEDURE — 250N000011 HC RX IP 250 OP 636: Performed by: INTERNAL MEDICINE

## 2023-03-23 PROCEDURE — 96413 CHEMO IV INFUSION 1 HR: CPT

## 2023-03-23 PROCEDURE — 36591 DRAW BLOOD OFF VENOUS DEVICE: CPT | Performed by: INTERNAL MEDICINE

## 2023-03-23 RX ORDER — HEPARIN SODIUM (PORCINE) LOCK FLUSH IV SOLN 100 UNIT/ML 100 UNIT/ML
5 SOLUTION INTRAVENOUS EVERY 8 HOURS
Status: DISCONTINUED | OUTPATIENT
Start: 2023-03-23 | End: 2023-03-23 | Stop reason: HOSPADM

## 2023-03-23 RX ORDER — HEPARIN SODIUM (PORCINE) LOCK FLUSH IV SOLN 100 UNIT/ML 100 UNIT/ML
5 SOLUTION INTRAVENOUS ONCE
Status: COMPLETED | OUTPATIENT
Start: 2023-03-23 | End: 2023-03-23

## 2023-03-23 RX ADMIN — SODIUM CHLORIDE 250 ML: 9 INJECTION, SOLUTION INTRAVENOUS at 10:47

## 2023-03-23 RX ADMIN — Medication 5 ML: at 11:21

## 2023-03-23 RX ADMIN — Medication 5 ML: at 10:14

## 2023-03-23 RX ADMIN — TRASTUZUMAB 450 MG: 150 INJECTION, POWDER, LYOPHILIZED, FOR SOLUTION INTRAVENOUS at 10:48

## 2023-03-23 ASSESSMENT — PAIN SCALES - GENERAL: PAINLEVEL: NO PAIN (0)

## 2023-03-23 NOTE — NURSING NOTE
Chief Complaint   Patient presents with     Chemotherapy     Cycle 95 Day 1 Trastuzumab (HERCEPTIN)     Port Draw     Labs drawn by RN via port, vitals taken.     Port accessed with 20 gauge 3/4 inch flat needle by RN, labs collected, line flushed with saline and heparin.  Vitals taken. Pt checked in for appointment(s).    Kallie Aguilera RN

## 2023-03-23 NOTE — PROGRESS NOTES
Infusion Nursing Note:  Hoda Brush presents today for Cycle 95 Day 1 Trastuzumab (HERCEPTIN).    Patient seen by provider today: No   present during visit today: Not Applicable.  Patient declined the use of an  today.    Note: Patient arrives feeling well.  She reports no changes and says she has been tolerating her infusions well.  She offers no new concerns at this time.    Intravenous Access:  Implanted Port.    Treatment Conditions:  Lab Results   Component Value Date    HGB 12.4 03/23/2023    WBC 6.7 03/23/2023    ANEU 3.1 06/22/2021    ANEUTAUTO 3.4 03/23/2023     03/23/2023      ECHO/MUGA completed 1/16/23  EF 60-65%.    Post Infusion Assessment:  Patient tolerated infusion without incident.  Blood return noted pre and post infusion.  Site patent and intact, free from redness, edema or discomfort.  No evidence of extravasations.  Access discontinued per protocol.     Discharge Plan:   Patient declined prescription refills.  Discharge instructions reviewed with: Patient.  Patient and/or family verbalized understanding of discharge instructions and all questions answered.  AVS to patient via [employer].  Patient will return 4/13/23 for next appointment.   Patient discharged in stable condition accompanied by: self.  Departure Mode: Ambulatory.  Face to Face time: 0  .      ROLA BARBA RN

## 2023-03-25 LAB — CANCER AG27-29 SERPL-ACNC: <9 U/ML

## 2023-04-03 PROBLEM — C50.919 PRIMARY MALIGNANT NEOPLASM OF BREAST WITH METASTASIS (H): Status: ACTIVE | Noted: 2017-10-10

## 2023-04-03 PROBLEM — C79.51 MALIGNANT NEOPLASM METASTATIC TO BONE (H): Status: ACTIVE | Noted: 2017-09-14

## 2023-04-12 ENCOUNTER — HOSPITAL ENCOUNTER (OUTPATIENT)
Dept: CT IMAGING | Facility: CLINIC | Age: 68
Discharge: HOME | End: 2023-04-12
Attending: INTERNAL MEDICINE
Payer: COMMERCIAL

## 2023-04-12 ENCOUNTER — HOSPITAL ENCOUNTER (OUTPATIENT)
Dept: CARDIOLOGY | Facility: CLINIC | Age: 68
Discharge: HOME | End: 2023-04-12
Attending: INTERNAL MEDICINE
Payer: COMMERCIAL

## 2023-04-12 ENCOUNTER — HOSPITAL ENCOUNTER (OUTPATIENT)
Facility: CLINIC | Age: 68
Discharge: HOME | End: 2023-04-12
Payer: COMMERCIAL

## 2023-04-12 DIAGNOSIS — C50.912 MALIGNANT NEOPLASM OF LEFT FEMALE BREAST, UNSPECIFIED ESTROGEN RECEPTOR STATUS, UNSPECIFIED SITE OF BREAST (H): ICD-10-CM

## 2023-04-12 DIAGNOSIS — Z51.11 ENCOUNTER FOR ANTINEOPLASTIC CHEMOTHERAPY: ICD-10-CM

## 2023-04-12 LAB — LVEF ECHO: NORMAL

## 2023-04-12 PROCEDURE — 93325 DOPPLER ECHO COLOR FLOW MAPG: CPT

## 2023-04-12 PROCEDURE — 250N000011 HC RX IP 250 OP 636: Performed by: RADIOLOGY

## 2023-04-12 PROCEDURE — 250N000011 HC RX IP 250 OP 636: Performed by: INTERNAL MEDICINE

## 2023-04-12 PROCEDURE — 93321 DOPPLER ECHO F-UP/LMTD STD: CPT | Mod: 26 | Performed by: INTERNAL MEDICINE

## 2023-04-12 PROCEDURE — 250N000009 HC RX 250: Performed by: INTERNAL MEDICINE

## 2023-04-12 PROCEDURE — 93321 DOPPLER ECHO F-UP/LMTD STD: CPT

## 2023-04-12 PROCEDURE — 93325 DOPPLER ECHO COLOR FLOW MAPG: CPT | Mod: 26 | Performed by: INTERNAL MEDICINE

## 2023-04-12 PROCEDURE — 93308 TTE F-UP OR LMTD: CPT | Mod: 26 | Performed by: INTERNAL MEDICINE

## 2023-04-12 PROCEDURE — 999N000154 HC STATISTIC RADIOLOGY XRAY, US, CT, MAR, NM

## 2023-04-12 PROCEDURE — 74177 CT ABD & PELVIS W/CONTRAST: CPT

## 2023-04-12 RX ORDER — HEPARIN SODIUM,PORCINE 10 UNIT/ML
5-10 VIAL (ML) INTRAVENOUS
Status: DISCONTINUED | OUTPATIENT
Start: 2023-04-12 | End: 2023-04-13 | Stop reason: HOSPADM

## 2023-04-12 RX ORDER — IOPAMIDOL 755 MG/ML
89 INJECTION, SOLUTION INTRAVASCULAR ONCE
Status: COMPLETED | OUTPATIENT
Start: 2023-04-12 | End: 2023-04-12

## 2023-04-12 RX ORDER — HEPARIN SODIUM (PORCINE) LOCK FLUSH IV SOLN 100 UNIT/ML 100 UNIT/ML
5-10 SOLUTION INTRAVENOUS
Status: DISCONTINUED | OUTPATIENT
Start: 2023-04-12 | End: 2023-04-13 | Stop reason: HOSPADM

## 2023-04-12 RX ORDER — HEPARIN SODIUM,PORCINE 10 UNIT/ML
5-10 VIAL (ML) INTRAVENOUS EVERY 24 HOURS
Status: DISCONTINUED | OUTPATIENT
Start: 2023-04-12 | End: 2023-04-13 | Stop reason: HOSPADM

## 2023-04-12 RX ADMIN — SODIUM CHLORIDE 65 ML: 9 INJECTION, SOLUTION INTRAVENOUS at 09:29

## 2023-04-12 RX ADMIN — Medication 5 ML: at 09:32

## 2023-04-12 RX ADMIN — IOPAMIDOL 89 ML: 755 INJECTION, SOLUTION INTRAVENOUS at 09:28

## 2023-04-13 ENCOUNTER — INFUSION THERAPY VISIT (OUTPATIENT)
Dept: ONCOLOGY | Facility: CLINIC | Age: 68
End: 2023-04-13
Attending: INTERNAL MEDICINE
Payer: COMMERCIAL

## 2023-04-13 ENCOUNTER — APPOINTMENT (OUTPATIENT)
Dept: LAB | Facility: CLINIC | Age: 68
End: 2023-04-13
Attending: INTERNAL MEDICINE
Payer: COMMERCIAL

## 2023-04-13 VITALS
WEIGHT: 180.3 LBS | RESPIRATION RATE: 16 BRPM | SYSTOLIC BLOOD PRESSURE: 117 MMHG | OXYGEN SATURATION: 96 % | TEMPERATURE: 98.2 F | BODY MASS INDEX: 32.19 KG/M2 | HEART RATE: 76 BPM | DIASTOLIC BLOOD PRESSURE: 74 MMHG

## 2023-04-13 DIAGNOSIS — C50.912 MALIGNANT NEOPLASM OF LEFT FEMALE BREAST, UNSPECIFIED ESTROGEN RECEPTOR STATUS, UNSPECIFIED SITE OF BREAST (H): Primary | ICD-10-CM

## 2023-04-13 DIAGNOSIS — E87.0 SERUM SODIUM ELEVATED: ICD-10-CM

## 2023-04-13 LAB
ALBUMIN SERPL BCG-MCNC: 4 G/DL (ref 3.5–5.2)
ALP SERPL-CCNC: 48 U/L (ref 35–104)
ALT SERPL W P-5'-P-CCNC: 28 U/L (ref 10–35)
ANION GAP SERPL CALCULATED.3IONS-SCNC: 10 MMOL/L (ref 7–15)
ANION GAP SERPL CALCULATED.3IONS-SCNC: 12 MMOL/L (ref 7–15)
AST SERPL W P-5'-P-CCNC: 29 U/L (ref 10–35)
BASOPHILS # BLD AUTO: 0 10E3/UL (ref 0–0.2)
BASOPHILS NFR BLD AUTO: 1 %
BILIRUB SERPL-MCNC: 0.3 MG/DL
BUN SERPL-MCNC: 13.9 MG/DL (ref 8–23)
BUN SERPL-MCNC: 14 MG/DL (ref 8–23)
CALCIUM SERPL-MCNC: 9.5 MG/DL (ref 8.8–10.2)
CALCIUM SERPL-MCNC: 9.5 MG/DL (ref 8.8–10.2)
CEA SERPL-MCNC: 1.3 NG/ML
CHLORIDE SERPL-SCNC: 105 MMOL/L (ref 98–107)
CHLORIDE SERPL-SCNC: 106 MMOL/L (ref 98–107)
CREAT SERPL-MCNC: 0.8 MG/DL (ref 0.51–0.95)
CREAT SERPL-MCNC: 0.81 MG/DL (ref 0.51–0.95)
DEPRECATED HCO3 PLAS-SCNC: 27 MMOL/L (ref 22–29)
DEPRECATED HCO3 PLAS-SCNC: 28 MMOL/L (ref 22–29)
EOSINOPHIL # BLD AUTO: 0.2 10E3/UL (ref 0–0.7)
EOSINOPHIL NFR BLD AUTO: 3 %
ERYTHROCYTE [DISTWIDTH] IN BLOOD BY AUTOMATED COUNT: 13.7 % (ref 10–15)
GFR SERPL CREATININE-BSD FRML MDRD: 79 ML/MIN/1.73M2
GFR SERPL CREATININE-BSD FRML MDRD: 80 ML/MIN/1.73M2
GLUCOSE SERPL-MCNC: 89 MG/DL (ref 70–99)
GLUCOSE SERPL-MCNC: 91 MG/DL (ref 70–99)
HCT VFR BLD AUTO: 38.6 % (ref 35–47)
HGB BLD-MCNC: 12.2 G/DL (ref 11.7–15.7)
IMM GRANULOCYTES # BLD: 0 10E3/UL
IMM GRANULOCYTES NFR BLD: 0 %
LYMPHOCYTES # BLD AUTO: 2.8 10E3/UL (ref 0.8–5.3)
LYMPHOCYTES NFR BLD AUTO: 42 %
MCH RBC QN AUTO: 29.3 PG (ref 26.5–33)
MCHC RBC AUTO-ENTMCNC: 31.6 G/DL (ref 31.5–36.5)
MCV RBC AUTO: 93 FL (ref 78–100)
MONOCYTES # BLD AUTO: 0.4 10E3/UL (ref 0–1.3)
MONOCYTES NFR BLD AUTO: 6 %
NEUTROPHILS # BLD AUTO: 3.2 10E3/UL (ref 1.6–8.3)
NEUTROPHILS NFR BLD AUTO: 48 %
NRBC # BLD AUTO: 0 10E3/UL
NRBC BLD AUTO-RTO: 0 /100
PLATELET # BLD AUTO: 196 10E3/UL (ref 150–450)
POTASSIUM SERPL-SCNC: 4.3 MMOL/L (ref 3.4–5.3)
POTASSIUM SERPL-SCNC: 4.3 MMOL/L (ref 3.4–5.3)
PROT SERPL-MCNC: 7.5 G/DL (ref 6.4–8.3)
RBC # BLD AUTO: 4.16 10E6/UL (ref 3.8–5.2)
SODIUM SERPL-SCNC: 143 MMOL/L (ref 136–145)
SODIUM SERPL-SCNC: 145 MMOL/L (ref 136–145)
WBC # BLD AUTO: 6.6 10E3/UL (ref 4–11)

## 2023-04-13 PROCEDURE — 85025 COMPLETE CBC W/AUTO DIFF WBC: CPT | Performed by: NURSE PRACTITIONER

## 2023-04-13 PROCEDURE — 99214 OFFICE O/P EST MOD 30 MIN: CPT | Performed by: NURSE PRACTITIONER

## 2023-04-13 PROCEDURE — 86300 IMMUNOASSAY TUMOR CA 15-3: CPT | Performed by: NURSE PRACTITIONER

## 2023-04-13 PROCEDURE — 82378 CARCINOEMBRYONIC ANTIGEN: CPT | Performed by: NURSE PRACTITIONER

## 2023-04-13 PROCEDURE — 36415 COLL VENOUS BLD VENIPUNCTURE: CPT | Performed by: NURSE PRACTITIONER

## 2023-04-13 PROCEDURE — 250N000011 HC RX IP 250 OP 636: Performed by: INTERNAL MEDICINE

## 2023-04-13 PROCEDURE — 80053 COMPREHEN METABOLIC PANEL: CPT

## 2023-04-13 PROCEDURE — 36415 COLL VENOUS BLD VENIPUNCTURE: CPT

## 2023-04-13 PROCEDURE — 80053 COMPREHEN METABOLIC PANEL: CPT | Performed by: NURSE PRACTITIONER

## 2023-04-13 PROCEDURE — G0463 HOSPITAL OUTPT CLINIC VISIT: HCPCS | Mod: 25 | Performed by: NURSE PRACTITIONER

## 2023-04-13 PROCEDURE — 82310 ASSAY OF CALCIUM: CPT

## 2023-04-13 PROCEDURE — 96413 CHEMO IV INFUSION 1 HR: CPT

## 2023-04-13 PROCEDURE — 258N000003 HC RX IP 258 OP 636: Performed by: INTERNAL MEDICINE

## 2023-04-13 RX ORDER — EPINEPHRINE 1 MG/ML
0.3 INJECTION, SOLUTION INTRAMUSCULAR; SUBCUTANEOUS EVERY 5 MIN PRN
Status: CANCELLED | OUTPATIENT
Start: 2023-05-02

## 2023-04-13 RX ORDER — ALBUTEROL SULFATE 0.83 MG/ML
2.5 SOLUTION RESPIRATORY (INHALATION)
Status: CANCELLED | OUTPATIENT
Start: 2023-05-02

## 2023-04-13 RX ORDER — ALBUTEROL SULFATE 0.83 MG/ML
2.5 SOLUTION RESPIRATORY (INHALATION)
Status: CANCELLED | OUTPATIENT
Start: 2023-04-13

## 2023-04-13 RX ORDER — SODIUM CHLORIDE 9 MG/ML
1000 INJECTION, SOLUTION INTRAVENOUS CONTINUOUS PRN
Status: CANCELLED
Start: 2023-04-13

## 2023-04-13 RX ORDER — DIPHENHYDRAMINE HCL 25 MG
50 CAPSULE ORAL ONCE
Status: CANCELLED
Start: 2023-05-02

## 2023-04-13 RX ORDER — HEPARIN SODIUM (PORCINE) LOCK FLUSH IV SOLN 100 UNIT/ML 100 UNIT/ML
5 SOLUTION INTRAVENOUS EVERY 8 HOURS
Status: CANCELLED | OUTPATIENT
Start: 2023-05-02

## 2023-04-13 RX ORDER — EPINEPHRINE 0.3 MG/.3ML
0.3 INJECTION SUBCUTANEOUS EVERY 5 MIN PRN
Status: CANCELLED | OUTPATIENT
Start: 2023-04-13

## 2023-04-13 RX ORDER — ACETAMINOPHEN 325 MG/1
650 TABLET ORAL
Status: CANCELLED | OUTPATIENT
Start: 2023-04-13

## 2023-04-13 RX ORDER — EPINEPHRINE 0.3 MG/.3ML
0.3 INJECTION SUBCUTANEOUS EVERY 5 MIN PRN
Status: CANCELLED | OUTPATIENT
Start: 2023-05-02

## 2023-04-13 RX ORDER — DIPHENHYDRAMINE HCL 25 MG
50 CAPSULE ORAL ONCE
Status: CANCELLED
Start: 2023-04-13

## 2023-04-13 RX ORDER — EPINEPHRINE 1 MG/ML
0.3 INJECTION, SOLUTION INTRAMUSCULAR; SUBCUTANEOUS EVERY 5 MIN PRN
Status: CANCELLED | OUTPATIENT
Start: 2023-04-13

## 2023-04-13 RX ORDER — DIPHENHYDRAMINE HYDROCHLORIDE 50 MG/ML
50 INJECTION INTRAMUSCULAR; INTRAVENOUS
Status: CANCELLED
Start: 2023-04-13

## 2023-04-13 RX ORDER — ACETAMINOPHEN 325 MG/1
650 TABLET ORAL
Status: CANCELLED | OUTPATIENT
Start: 2023-05-02

## 2023-04-13 RX ORDER — METHYLPREDNISOLONE SODIUM SUCCINATE 125 MG/2ML
125 INJECTION, POWDER, LYOPHILIZED, FOR SOLUTION INTRAMUSCULAR; INTRAVENOUS
Status: CANCELLED
Start: 2023-05-02

## 2023-04-13 RX ORDER — METHYLPREDNISOLONE SODIUM SUCCINATE 125 MG/2ML
125 INJECTION, POWDER, LYOPHILIZED, FOR SOLUTION INTRAMUSCULAR; INTRAVENOUS
Status: CANCELLED
Start: 2023-04-13

## 2023-04-13 RX ORDER — LORAZEPAM 2 MG/ML
0.5 INJECTION INTRAMUSCULAR EVERY 4 HOURS PRN
Status: CANCELLED
Start: 2023-04-13

## 2023-04-13 RX ORDER — ALBUTEROL SULFATE 90 UG/1
1-2 AEROSOL, METERED RESPIRATORY (INHALATION)
Status: CANCELLED
Start: 2023-05-02

## 2023-04-13 RX ORDER — SODIUM CHLORIDE 9 MG/ML
1000 INJECTION, SOLUTION INTRAVENOUS CONTINUOUS PRN
Status: CANCELLED
Start: 2023-05-02

## 2023-04-13 RX ORDER — ALBUTEROL SULFATE 90 UG/1
1-2 AEROSOL, METERED RESPIRATORY (INHALATION)
Status: CANCELLED
Start: 2023-04-13

## 2023-04-13 RX ORDER — DIPHENHYDRAMINE HYDROCHLORIDE 50 MG/ML
50 INJECTION INTRAMUSCULAR; INTRAVENOUS
Status: CANCELLED
Start: 2023-05-02

## 2023-04-13 RX ORDER — HEPARIN SODIUM (PORCINE) LOCK FLUSH IV SOLN 100 UNIT/ML 100 UNIT/ML
5 SOLUTION INTRAVENOUS EVERY 8 HOURS
Status: DISCONTINUED | OUTPATIENT
Start: 2023-04-13 | End: 2023-04-13 | Stop reason: HOSPADM

## 2023-04-13 RX ORDER — HEPARIN SODIUM (PORCINE) LOCK FLUSH IV SOLN 100 UNIT/ML 100 UNIT/ML
5 SOLUTION INTRAVENOUS ONCE
Status: COMPLETED | OUTPATIENT
Start: 2023-04-13 | End: 2023-04-13

## 2023-04-13 RX ORDER — LORAZEPAM 2 MG/ML
0.5 INJECTION INTRAMUSCULAR EVERY 4 HOURS PRN
Status: CANCELLED
Start: 2023-05-02

## 2023-04-13 RX ADMIN — Medication 5 ML: at 10:15

## 2023-04-13 RX ADMIN — Medication 5 ML: at 12:27

## 2023-04-13 RX ADMIN — SODIUM CHLORIDE 250 ML: 9 INJECTION, SOLUTION INTRAVENOUS at 11:56

## 2023-04-13 RX ADMIN — TRASTUZUMAB 450 MG: 150 INJECTION, POWDER, LYOPHILIZED, FOR SOLUTION INTRAVENOUS at 11:56

## 2023-04-13 ASSESSMENT — PAIN SCALES - GENERAL: PAINLEVEL: NO PAIN (0)

## 2023-04-13 NOTE — NURSING NOTE
"Oncology Rooming Note    April 13, 2023 10:44 AM   Hoda Brush is a 68 year old female who presents for:    Chief Complaint   Patient presents with     Port Draw     Labs drawn via port by RN. Vitals taken.     Oncology Clinic Visit     Breast ca     Initial Vitals: /74 (BP Location: Right arm, Patient Position: Sitting, Cuff Size: Adult Regular)   Pulse 76   Temp 98.2  F (36.8  C) (Oral)   Resp 16   Wt 81.8 kg (180 lb 4.8 oz)   SpO2 96%   BMI 32.19 kg/m   Estimated body mass index is 32.19 kg/m  as calculated from the following:    Height as of 9/12/22: 1.594 m (5' 2.75\").    Weight as of this encounter: 81.8 kg (180 lb 4.8 oz). Body surface area is 1.9 meters squared.  No Pain (0) Comment: Data Unavailable   No LMP recorded. Patient is postmenopausal.  Allergies reviewed: Yes  Medications reviewed: Yes    Medications: Refill DM Mastectomy bras and 1 prosthetese  Pharmacy name entered into [employer]: [facility] #[id number] [place], [place] - [address]    Clinical concerns: None      Gwen Avery"

## 2023-04-13 NOTE — LETTER
4/13/2023         RE: Hoda Brush  [address]  [facility] [address]        Dear Colleague,    Thank you for referring your patient, Hoda Brush, to the [facility]. Please see a copy of my visit note below.    Hoda is a patient from [facility] and is seen here for continuation of care for her metastatic ER+HER2- breast cancer.  She is a [place] refugee from [facility] and was initially seen in clinic with her daughter.  The only data we have from [facility] is an English translation of her records.       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  On 02/11/2014, she was seen by an oncologist.  The clinical staging of T4b N2 M1 was noted. Her breast cancer was on the left side. There was no right breast cancer, confirmed by the patient and her daughter, correcting a possible error in the translated records.  ER was positive in 100% of the cells, ND at 14% and HER2 was 3+ positive.  It appears that this histopathologic information is on the mastectomy specimen. She underwent an MRI for staging of presumptive bone metastases which was performed 03/02/2014.  There were skeletal metastases in the thoracic vertebrae at 12, L1, L3, L5 and S1 vertebral body, ranging in size from 0.7-3.0 cm in size.  Ischial and right femur were also involved, as well as a large iliac mass measuring about 15 cm in the uterus. 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   Radiation Oncology consultation was performed 03/11/2014, and she was given radiation in 1 dose of 6 Gy to the right iliac lesion.        With the initial diagnosis, she initiated treatment with 6 cycles of CAF neoadjuvant chemotherapy 02/14, 07/07, 03/28, 04/18/14, 05/08 and 05/29/14. She also received monthly zoledronic acid.  She then underwent a modified left mastectomy of Palacios type and left lymphadenoectomy on 06/27/2014.   Pathologic  examination showed number at [facility] was [id number] showed infiltrative grade 2 ductal cancer with 6 level 1 metastatic lymph nodes and 3 level 2 metastatic lymph nodes.  The differentiation was intermediate.  The tumor was ER positive 70% of the cells, NM positive in 40% of the cells and HER2 was 3+ by immunohistochemistry and the Ki-67 labeling index was 20%. Her staging after surgery was stage IV, pT4b N2 M1.  I don't see a biopsy of the skeletal metastases.  She then had continuation with chemotherapy with 4 cycles of Herceptin and taxane with monthly zoledronic acid.  Tamoxifen was initiated.  She then had two years of Herceptin and a decision was made not to continue further Herceptin.  She continued on zoledronic acid every 3 months. She was clinically stable. [place] then [place] the [place] new refugee from [facility].  She has now been changed to letrozole.  Denosumab has now been held for new diagnosis of osteonecrosis of the R maxilla.     History of cholecystectomy 2020.    Seen in the ED on 10/4/2021, for acute abdominal pain.  Elevated LFTs, elevated lipase.  Question of stone versus pancreatitis.  She was discharged and followed up with an outpatient gastroenterology clinic.  They recommended clear liquid diet and ERCP.        TREATMENT HISTORY:  A. Initial diagnosis with metastatic breast cancer in [facility].  Neoadjuvant CAF x 6.   B. Left mastectomy. Left axillary node dissection.  C. Radiation in 1 dose to R iliac region.  C. Herceptin for 2 years taxane for a prescribed course then stopped, monthly zoledronic acid.  She had 2 years of Herceptin with tamoxifen added after chemotherapy.  D. Tamoxifen alone and zoledronic acid every 3 months starting 2014.  Letrozole was started   E. [place]  We restarted Herceptin every 3 weeks and continued tamoxifen. Bone targeted agent changed to denosumab every 9 weeks. Zometa discontinued 2017.  Tamoxifen was changed to letrozole August 2019.     F. Xgeva discontinued 12-17-19 because of dental issues.   G. J+J vaccine March, 2021.  H. Was on Zometa once May 11.  Reaction with eye lid swelling. Discontinued Zometa  H. Restart Xgeva 6-22-21.  Continuing the trastuzumab and letrozole.  Both tolerated well.       INTERVAL HISTORY   Today, she states that she is feeling very well. Denies any concerns. No headaches or blurry vision. No chest pain, shortness of breath, or LE swelling. Continues to tolerate letrozole well without any significant side effects. No recent fevers, chills, or infectious symptoms. No new dental concerns. Is remaining active. Taking calcium and vitamin D.     ROS  Patient denies the following except if noted above: fevers, body aches, chills, headaches, vision changes, dizziness, chest pain, shortness of breath, nausea, vomiting, diarrhea, constipation, abdominal pain, rashes, bruising/bleeding, mouth sores, swelling or pain in the legs.       PHYSICAL EXAM:  /74 (BP Location: Right arm, Patient Position: Sitting, Cuff Size: Adult Regular)   Pulse 76   Temp 98.2  F (36.8  C) (Oral)   Resp 16   Wt 81.8 kg (180 lb 4.8 oz)   SpO2 96%   BMI 32.19 kg/m      Wt Readings from Last 4 Encounters:   04/13/23 81.8 kg (180 lb 4.8 oz)   03/23/23 81.6 kg (179 lb 12.8 oz)   03/02/23 81.5 kg (179 lb 11.2 oz)   02/09/23 79.7 kg (175 lb 12.8 oz)       VS: Above vitals reviewed, stable without concerns.  General: Resting comfortably in no acute distress.  Head: Normocephalic, atraumatic.   Heart: No lower extremity edema.  Lung: Normal respiratory effort. Speaking in full, fluid sentences.  Abdomen: Abdomen non-distended.  Neuro: AAO x3. Gait is steady. Moving all extremities   Skin: Warm, dry, intact. No rashes, no suspicious lesions. No petechia or bruising noted on exposed skin.    Labs  Most Recent 3 CBC's:  Recent Labs   Lab Test 04/13/23  1022 03/23/23  0954 03/02/23  0926   WBC 6.6 6.7 6.6   HGB 12.2 12.4 12.3   MCV 93 94 93     202 183    Most Recent 3 BMP's:  Recent Labs   Lab Test 04/13/23  1022 03/23/23  0954 03/02/23  0926    142 141   POTASSIUM 4.3 4.2 4.3   CHLORIDE 114* 106 104   CO2 28 29 28   BUN 14.0 13.0 14.7   CR 0.80 0.74 0.76   ANIONGAP 3* 7 9   TAYLER 9.5 9.4 9.3   GLC 91 128* 98    Most Recent 2 LFT's:  Recent Labs   Lab Test 04/13/23  1022 03/23/23  0954   AST 29 32   ALT 28 39*   ALKPHOS 48 45   BILITOTAL 0.3 0.3      I reviewed the above labs today. CEA and CA 27-29 pending.     IMAGING  Reviewed with patient today:    CT CHEST/ABDOMEN/PELVIS W CONTRAST 4/12/2023 9:40 AM     CLINICAL HISTORY: Malignant neoplasm of left female breast,  unspecified estrogen receptor status, unspecified site of breast (H)     TECHNIQUE: CT scan of the chest, abdomen, and pelvis was performed  following injection of IV contrast. Multiplanar reformats were  obtained. Dose reduction techniques were used.   CONTRAST: 89 mL Isovue-370     COMPARISON: 1/16/2023     FINDINGS:   LUNGS AND PLEURA: Stable 6 mm nodule in the right middle lobe and 6 mm  nodule in the left lower lobe (series 5, image 198 and 205). No new or  enlarging nodules. No infiltrate or pleural effusion.     MEDIASTINUM/AXILLAE: Left mastectomy. No lymphadenopathy. Right IJ  port catheter. No thoracic aortic aneurysm. No coronary artery  calcification.     HEPATOBILIARY: Cholecystectomy. No focal lesions in the liver.     PANCREAS: Normal.     SPLEEN: Normal.     ADRENAL GLANDS: Normal.     KIDNEYS/BLADDER: Normal.     BOWEL: No obstruction or inflammatory change.     PELVIC ORGANS: Stable uterine fibroids.     ADDITIONAL FINDINGS: No lymphadenopathy in the abdomen and pelvis. No  abdominal aortic aneurysm. No free fluid or fluid collections.     MUSCULOSKELETAL: Stable appearance of multiple sclerotic osseous  lesions. No new lesions.                                                          IMPRESSION:  1.  Stable appearance of sclerotic osseous lesions.  2.  Stable small  pulmonary nodules.  3.  No new disease in the chest, abdomen and pelvis.     MARYANNE FUNEZ MD       Echocardiogram 4/12/2023:  Interpretation Summary     The left ventricle is normal in size.  Left ventricular systolic function is normal.  The visual ejection fraction is 55-60%.  No regional wall motion abnormalities noted.  Global peak LV longitudinal strain is averaged at -19.3%. This is within  reported normal limits (normal <-18%).  Compared to prior study, there is no significant change.      ASSESSMENT AND PLAN:     Hoda Brush is a 68 year old woman with a history of ER-positive, KS-positive, HER2 positive breast cancer:  She is from [facility], formally from [facility], and came [place] live in the [place] with [place].  The tumor is ER positive, KS positive and HER2 positive. She had metastatic disease at the time of presentation with bone-only metastases by report.  She underwent neoadjuvant CAF, had a left mastectomy, left axillary lymph node dissection, radiation to the right hip, which included the right iliac region where she had metastatic disease.  She was initially on tamoxifen but then was switched to letrozole.  She continues on this and every 3-week trastuzumab. She has had no evidence of disease progression for the last 3 years.  We have continued Herceptin every 3 weeks as well as daily letrozole.   - CT CAP 4/12 stable.   - Tumor markers pending.  - Proceed with herceptin on today. Continue letrozole.  - Echo every 3 months-- reviewed from 4/12, no changes in EF. Repeat 7/2023.  - Provider visit every 6 weeks as scheduled.     Bone health  - Xgeva was on hold due to possible ONJ. See Dr. Aguiar's note from 12/16/2021--this was resumed on 1/6/22.   - To be given every 3 months--this was preioulsy confirmed with Dr. Aguiar.   - Currently no dental issues, Xgeva due next month in May 2023.     Elevated serum sodium level:  - No past elevations, will recheck today with new sample.   - No new  medications, did have CT contrast yesterday; denies heavy salt intake and is eating and drinking normally.  - No excessive thirst or increase in urination.   - Recheck today: 142  - Received message that the initial value was corrected to 145.     Vaccines  - Up to date on Flu and Covid     Patient will call in the interim with any questions or concerns. They voice understanding and agree with the above plan.     KATHI Valencia, CNP  [facility] [address] [id number] [phone]

## 2023-04-13 NOTE — PROGRESS NOTES
Infusion Nursing Note:  Hoda Brush presents today for Cycle 96, day 1 Herceptin.    Patient seen by provider today: Yes: Kassidy Robles NP    Note: Patient presents to clinic today feeling well with no questions.  Pt did not request or require any intervention for pain today.    Jo=[id number].  Redrew per NP order.  Pt denies taking salt tabs, excess salt in diet, change in urination, excessive thirst.  She is drinking water per her normal diet.      Recheck Na WNL, ok to discharge per NP.    Intravenous Access:  Implanted Port.    Treatment Conditions:  Lab Results   Component Value Date    HGB 12.2 04/13/2023    WBC 6.6 04/13/2023    ANEU 3.1 06/22/2021    ANEUTAUTO 3.2 04/13/2023     04/13/2023      Lab Results   Component Value Date     (H) 04/13/2023    POTASSIUM 4.3 04/13/2023    CR 0.80 04/13/2023    TAYLER 9.5 04/13/2023    BILITOTAL 0.3 04/13/2023    ALBUMIN 4.0 04/13/2023    ALT 28 04/13/2023    AST 29 04/13/2023     ECHO/MUGA completed 4/12/2023 EF 55-60%    Post Infusion Assessment:  Patient tolerated infusion without incident.  Blood return noted pre and post infusion.  Site patent and intact, free from redness, edema or discomfort.  No evidence of extravasations.  Access discontinued per protocol.    Discharge Plan:   Patient declined prescription refills.  Discharge instructions reviewed with: Patient.  Patient and/or family verbalized understanding of discharge instructions and all questions answered.  AVS to patient via [employer].  Patient will return 5/2/2023 for next appointment.   Patient discharged in stable condition accompanied by: self.  Departure Mode: Ambulatory.  Face to Face time: 10 minutes.    Steffi Oh RN

## 2023-04-13 NOTE — PROGRESS NOTES
Hoda is a patient from [facility] and is seen here for continuation of care for her metastatic ER+HER2- breast cancer.  She is a [employer] refugee from [facility] and was initially seen in clinic with her daughter.  The only data we have from [facility] is an English translation of her records.       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  On 02/11/2014, she was seen by an oncologist.  The clinical staging of T4b N2 M1 was noted. Her breast cancer was on the left side. There was no right breast cancer, confirmed by the patient and her daughter, correcting a possible error in the translated records.  ER was positive in 100% of the cells, DC at 14% and HER2 was 3+ positive.  It appears that this histopathologic information is on the mastectomy specimen. She underwent an MRI for staging of presumptive bone metastases which was performed 03/02/2014.  There were skeletal metastases in the thoracic vertebrae at 12, L1, L3, L5 and S1 vertebral body, ranging in size from 0.7-3.0 cm in size.  Ischial and right femur were also involved, as well as a large iliac mass measuring about 15 cm in the uterus. 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   Radiation Oncology consultation was performed 03/11/2014, and she was given radiation in 1 dose of 6 Gy to the right iliac lesion.        With the initial diagnosis, she initiated treatment with 6 cycles of CAF neoadjuvant chemotherapy 02/14, 07/07, 03/28, 04/18/14, 05/08 and 05/29/14. She also received monthly zoledronic acid.  She then underwent a modified left mastectomy of Palacios type and left lymphadenoectomy on 06/27/2014.   Pathologic examination showed number at [facility] was [id number] showed infiltrative grade 2 ductal cancer with 6 level 1 metastatic lymph nodes and 3 level 2 metastatic lymph nodes.  The differentiation was intermediate.  The tumor was ER positive 70% of the cells, DC  positive in 40% of the cells and HER2 was 3+ by immunohistochemistry and the Ki-67 labeling index was 20%. Her staging after surgery was stage IV, pT4b N2 M1.  I don't see a biopsy of the skeletal metastases.  She then had continuation with chemotherapy with 4 cycles of Herceptin and taxane with monthly zoledronic acid.  Tamoxifen was initiated.  She then had two years of Herceptin and a decision was made not to continue further Herceptin.  She continued on zoledronic acid every 3 months. She was clinically stable. [place] then moved [place] the [place] new refugee from [facility].  She has now been changed to letrozole.  Denosumab has now been held for new diagnosis of osteonecrosis of the R maxilla.     History of cholecystectomy 2020.    Seen in the ED on 10/4/2021, for acute abdominal pain.  Elevated LFTs, elevated lipase.  Question of stone versus pancreatitis.  She was discharged and followed up with an outpatient gastroenterology clinic.  They recommended clear liquid diet and ERCP.        TREATMENT HISTORY:  A. Initial diagnosis with metastatic breast cancer in [facility].  Neoadjuvant CAF x 6.   B. Left mastectomy. Left axillary node dissection.  C. Radiation in 1 dose to R iliac region.  C. Herceptin for 2 years taxane for a prescribed course then stopped, monthly zoledronic acid.  She had 2 years of Herceptin with tamoxifen added after chemotherapy.  D. Tamoxifen alone and zoledronic acid every 3 months starting 2014.  Letrozole was started   [place]  We restarted Herceptin every 3 weeks and continued tamoxifen. Bone targeted agent changed to denosumab every 9 weeks. Zometa discontinued 2017.  Tamoxifen was changed to letrozole August 2019.    F. Xgeva discontinued 12-17-19 because of dental issues.   G. J+J vaccine March, 2021.  H. Was on Zometa once May 11.  Reaction with eye lid swelling. Discontinued Zometa  H. Restart Xgeva 6-22-21.  Continuing the trastuzumab and letrozole.  Both tolerated well.        INTERVAL HISTORY   Today, she states that she is feeling very well. Denies any concerns. No headaches or blurry vision. No chest pain, shortness of breath, or LE swelling. Continues to tolerate letrozole well without any significant side effects. No recent fevers, chills, or infectious symptoms. No new dental concerns. Is remaining active. Taking calcium and vitamin D.     ROS  Patient denies the following except if noted above: fevers, body aches, chills, headaches, vision changes, dizziness, chest pain, shortness of breath, nausea, vomiting, diarrhea, constipation, abdominal pain, rashes, bruising/bleeding, mouth sores, swelling or pain in the legs.       PHYSICAL EXAM:  /74 (BP Location: Right arm, Patient Position: Sitting, Cuff Size: Adult Regular)   Pulse 76   Temp 98.2  F (36.8  C) (Oral)   Resp 16   Wt 81.8 kg (180 lb 4.8 oz)   SpO2 96%   BMI 32.19 kg/m      Wt Readings from Last 4 Encounters:   04/13/23 81.8 kg (180 lb 4.8 oz)   03/23/23 81.6 kg (179 lb 12.8 oz)   03/02/23 81.5 kg (179 lb 11.2 oz)   02/09/23 79.7 kg (175 lb 12.8 oz)       VS: Above vitals reviewed, stable without concerns.  General: Resting comfortably in no acute distress.  Head: Normocephalic, atraumatic.   Heart: No lower extremity edema.  Lung: Normal respiratory effort. Speaking in full, fluid sentences.  Abdomen: Abdomen non-distended.  Neuro: AAO x3. Gait is steady. Moving all extremities   Skin: Warm, dry, intact. No rashes, no suspicious lesions. No petechia or bruising noted on exposed skin.    Labs  Most Recent 3 CBC's:  Recent Labs   Lab Test 04/13/23  1022 03/23/23  0954 03/02/23  0926   WBC 6.6 6.7 6.6   HGB 12.2 12.4 12.3   MCV 93 94 93    202 183    Most Recent 3 BMP's:  Recent Labs   Lab Test 04/13/23  1022 03/23/23  0954 03/02/23  0926    142 141   POTASSIUM 4.3 4.2 4.3   CHLORIDE 114* 106 104   CO2 28 29 28   BUN 14.0 13.0 14.7   CR 0.80 0.74 0.76   ANIONGAP 3* 7 9   TAYLER 9.5 9.4 9.3   GLC 91  128* 98    Most Recent 2 LFT's:  Recent Labs   Lab Test 04/13/23  1022 03/23/23  0954   AST 29 32   ALT 28 39*   ALKPHOS 48 45   BILITOTAL 0.3 0.3      I reviewed the above labs today. CEA and CA 27-29 pending.     IMAGING  Reviewed with patient today:    CT CHEST/ABDOMEN/PELVIS W CONTRAST 4/12/2023 9:40 AM     CLINICAL HISTORY: Malignant neoplasm of left female breast,  unspecified estrogen receptor status, unspecified site of breast (H)     TECHNIQUE: CT scan of the chest, abdomen, and pelvis was performed  following injection of IV contrast. Multiplanar reformats were  obtained. Dose reduction techniques were used.   CONTRAST: 89 mL Isovue-370     COMPARISON: 1/16/2023     FINDINGS:   LUNGS AND PLEURA: Stable 6 mm nodule in the right middle lobe and 6 mm  nodule in the left lower lobe (series 5, image 198 and 205). No new or  enlarging nodules. No infiltrate or pleural effusion.     MEDIASTINUM/AXILLAE: Left mastectomy. No lymphadenopathy. Right IJ  port catheter. No thoracic aortic aneurysm. No coronary artery  calcification.     HEPATOBILIARY: Cholecystectomy. No focal lesions in the liver.     PANCREAS: Normal.     SPLEEN: Normal.     ADRENAL GLANDS: Normal.     KIDNEYS/BLADDER: Normal.     BOWEL: No obstruction or inflammatory change.     PELVIC ORGANS: Stable uterine fibroids.     ADDITIONAL FINDINGS: No lymphadenopathy in the abdomen and pelvis. No  abdominal aortic aneurysm. No free fluid or fluid collections.     MUSCULOSKELETAL: Stable appearance of multiple sclerotic osseous  lesions. No new lesions.                                                          IMPRESSION:  1.  Stable appearance of sclerotic osseous lesions.  2.  Stable small pulmonary nodules.  3.  No new disease in the chest, abdomen and pelvis.     MARYANNE FUNEZ MD       Echocardiogram 4/12/2023:  Interpretation Summary     The left ventricle is normal in size.  Left ventricular systolic function is normal.  The visual ejection fraction  is 55-60%.  No regional wall motion abnormalities noted.  Global peak LV longitudinal strain is averaged at -19.3%. This is within  reported normal limits (normal <-18%).  Compared to prior study, there is no significant change.      ASSESSMENT AND PLAN:     Hoda Brush is a 68 year old woman with a history of ER-positive, MI-positive, HER2 positive breast cancer:  She is from [facility], formally from [facility], and came to live in the [place] with [place].  The tumor is ER positive, MI positive and HER2 positive. She had metastatic disease at the time of presentation with bone-only metastases by report.  She underwent neoadjuvant CAF, had a left mastectomy, left axillary lymph node dissection, radiation to the right hip, which included the right iliac region where she had metastatic disease.  She was initially on tamoxifen but then was switched to letrozole.  She continues on this and every 3-week trastuzumab. She has had no evidence of disease progression for the last 3 years.  We have continued Herceptin every 3 weeks as well as daily letrozole.   - CT CAP 4/12 stable.   - Tumor markers pending.  - Proceed with herceptin on today. Continue letrozole.  - Echo every 3 months-- reviewed from 4/12, no changes in EF. Repeat 7/2023.  - Provider visit every 6 weeks as scheduled.     Bone health  - Xgeva was on hold due to possible ONJ. See Dr. Aguiar's note from 12/16/2021--this was resumed on 1/6/22.   - To be given every 3 months--this was preioulsy confirmed with Dr. Aguiar.   - Currently no dental issues, Xgeva due next month in May 2023.     Elevated serum sodium level:  - No past elevations, will recheck today with new sample.   - No new medications, did have CT contrast yesterday; denies heavy salt intake and is eating and drinking normally.  - No excessive thirst or increase in urination.   - Recheck today: 142  - Received message that the initial value was corrected to 145.     Vaccines  - Up to  date on Flu and Covid     Patient will call in the interim with any questions or concerns. They voice understanding and agree with the above plan.     KATHI Valencia, CNP  [facility] [address] [address] [phone]

## 2023-04-14 LAB — CANCER AG27-29 SERPL-ACNC: 9.6 U/ML

## 2023-05-02 ENCOUNTER — APPOINTMENT (OUTPATIENT)
Dept: LAB | Facility: CLINIC | Age: 68
End: 2023-05-02
Attending: INTERNAL MEDICINE
Payer: COMMERCIAL

## 2023-05-02 ENCOUNTER — INFUSION THERAPY VISIT (OUTPATIENT)
Dept: ONCOLOGY | Facility: CLINIC | Age: 68
End: 2023-05-02
Attending: INTERNAL MEDICINE
Payer: COMMERCIAL

## 2023-05-02 VITALS
HEART RATE: 80 BPM | OXYGEN SATURATION: 99 % | WEIGHT: 181.5 LBS | BODY MASS INDEX: 32.41 KG/M2 | SYSTOLIC BLOOD PRESSURE: 120 MMHG | DIASTOLIC BLOOD PRESSURE: 64 MMHG | TEMPERATURE: 97.7 F | RESPIRATION RATE: 18 BRPM

## 2023-05-02 DIAGNOSIS — C50.912 MALIGNANT NEOPLASM OF LEFT FEMALE BREAST, UNSPECIFIED ESTROGEN RECEPTOR STATUS, UNSPECIFIED SITE OF BREAST (H): Primary | ICD-10-CM

## 2023-05-02 LAB
ALBUMIN SERPL BCG-MCNC: 4 G/DL (ref 3.5–5.2)
ALP SERPL-CCNC: 49 U/L (ref 35–104)
ALT SERPL W P-5'-P-CCNC: 42 U/L (ref 10–35)
ANION GAP SERPL CALCULATED.3IONS-SCNC: 9 MMOL/L (ref 7–15)
AST SERPL W P-5'-P-CCNC: 40 U/L (ref 10–35)
BASOPHILS # BLD AUTO: 0.1 10E3/UL (ref 0–0.2)
BASOPHILS NFR BLD AUTO: 1 %
BILIRUB SERPL-MCNC: 0.2 MG/DL
BUN SERPL-MCNC: 16.2 MG/DL (ref 8–23)
CALCIUM SERPL-MCNC: 9.2 MG/DL (ref 8.8–10.2)
CEA SERPL-MCNC: 1 NG/ML
CHLORIDE SERPL-SCNC: 105 MMOL/L (ref 98–107)
CREAT SERPL-MCNC: 0.82 MG/DL (ref 0.51–0.95)
DEPRECATED HCO3 PLAS-SCNC: 27 MMOL/L (ref 22–29)
EOSINOPHIL # BLD AUTO: 0.3 10E3/UL (ref 0–0.7)
EOSINOPHIL NFR BLD AUTO: 4 %
ERYTHROCYTE [DISTWIDTH] IN BLOOD BY AUTOMATED COUNT: 13.6 % (ref 10–15)
GFR SERPL CREATININE-BSD FRML MDRD: 78 ML/MIN/1.73M2
GLUCOSE SERPL-MCNC: 86 MG/DL (ref 70–99)
HCT VFR BLD AUTO: 39.8 % (ref 35–47)
HGB BLD-MCNC: 12.5 G/DL (ref 11.7–15.7)
IMM GRANULOCYTES # BLD: 0 10E3/UL
IMM GRANULOCYTES NFR BLD: 0 %
LYMPHOCYTES # BLD AUTO: 3.2 10E3/UL (ref 0.8–5.3)
LYMPHOCYTES NFR BLD AUTO: 38 %
MCH RBC QN AUTO: 29.5 PG (ref 26.5–33)
MCHC RBC AUTO-ENTMCNC: 31.4 G/DL (ref 31.5–36.5)
MCV RBC AUTO: 94 FL (ref 78–100)
MONOCYTES # BLD AUTO: 0.6 10E3/UL (ref 0–1.3)
MONOCYTES NFR BLD AUTO: 7 %
NEUTROPHILS # BLD AUTO: 4.2 10E3/UL (ref 1.6–8.3)
NEUTROPHILS NFR BLD AUTO: 50 %
NRBC # BLD AUTO: 0 10E3/UL
NRBC BLD AUTO-RTO: 0 /100
PLATELET # BLD AUTO: 217 10E3/UL (ref 150–450)
POTASSIUM SERPL-SCNC: 4.3 MMOL/L (ref 3.4–5.3)
PROT SERPL-MCNC: 7.7 G/DL (ref 6.4–8.3)
RBC # BLD AUTO: 4.24 10E6/UL (ref 3.8–5.2)
SODIUM SERPL-SCNC: 141 MMOL/L (ref 136–145)
WBC # BLD AUTO: 8.4 10E3/UL (ref 4–11)

## 2023-05-02 PROCEDURE — 258N000003 HC RX IP 258 OP 636: Performed by: INTERNAL MEDICINE

## 2023-05-02 PROCEDURE — 82378 CARCINOEMBRYONIC ANTIGEN: CPT | Performed by: INTERNAL MEDICINE

## 2023-05-02 PROCEDURE — 36591 DRAW BLOOD OFF VENOUS DEVICE: CPT | Performed by: INTERNAL MEDICINE

## 2023-05-02 PROCEDURE — 250N000011 HC RX IP 250 OP 636: Performed by: INTERNAL MEDICINE

## 2023-05-02 PROCEDURE — 99207 PR NO BILLABLE SERVICE THIS VISIT: CPT

## 2023-05-02 PROCEDURE — 86300 IMMUNOASSAY TUMOR CA 15-3: CPT | Performed by: INTERNAL MEDICINE

## 2023-05-02 PROCEDURE — 80053 COMPREHEN METABOLIC PANEL: CPT | Performed by: INTERNAL MEDICINE

## 2023-05-02 PROCEDURE — 96413 CHEMO IV INFUSION 1 HR: CPT

## 2023-05-02 PROCEDURE — 85004 AUTOMATED DIFF WBC COUNT: CPT | Performed by: INTERNAL MEDICINE

## 2023-05-02 RX ORDER — HEPARIN SODIUM (PORCINE) LOCK FLUSH IV SOLN 100 UNIT/ML 100 UNIT/ML
500 SOLUTION INTRAVENOUS ONCE
Status: COMPLETED | OUTPATIENT
Start: 2023-05-02 | End: 2023-05-02

## 2023-05-02 RX ORDER — HEPARIN SODIUM (PORCINE) LOCK FLUSH IV SOLN 100 UNIT/ML 100 UNIT/ML
5 SOLUTION INTRAVENOUS EVERY 8 HOURS
Status: DISCONTINUED | OUTPATIENT
Start: 2023-05-02 | End: 2023-05-02 | Stop reason: HOSPADM

## 2023-05-02 RX ADMIN — TRASTUZUMAB 450 MG: 150 INJECTION, POWDER, LYOPHILIZED, FOR SOLUTION INTRAVENOUS at 14:28

## 2023-05-02 RX ADMIN — Medication 500 UNITS: at 13:39

## 2023-05-02 RX ADMIN — Medication 5 ML: at 15:02

## 2023-05-02 ASSESSMENT — PAIN SCALES - GENERAL: PAINLEVEL: NO PAIN (0)

## 2023-05-02 NOTE — PROGRESS NOTES
Infusion Nursing Note:  Hoda Brush presents today for C97D1 Herceptin.    Patient seen by provider today: No   present during visit today: Not Applicable.    Note: Patient reports at her baseline. Denies fever/chills. Denies fever/chills. No new complaints made.       Intravenous Access:  Implanted Port.    Treatment Conditions:  Lab Results   Component Value Date    HGB 12.5 05/02/2023    WBC 8.4 05/02/2023    ANEU 3.1 06/22/2021    ANEUTAUTO 4.2 05/02/2023     05/02/2023      Lab Results   Component Value Date     05/02/2023    POTASSIUM 4.3 05/02/2023    CR 0.82 05/02/2023    TAYLER 9.2 05/02/2023    BILITOTAL 0.2 05/02/2023    ALBUMIN 4.0 05/02/2023    ALT 42 (H) 05/02/2023    AST 40 (H) 05/02/2023     Results reviewed, labs MET treatment parameters, ok to proceed with treatment.  ECHO/MUGA completed 4/12/23  EF 55-60%.      Post Infusion Assessment:  Patient tolerated infusion without incident.  Blood return noted pre and post infusion.  Site patent and intact, free from redness, edema or discomfort.  No evidence of extravasations.  Access discontinued per protocol.       Discharge Plan:   Patient declined prescription refills.  Discharge instructions reviewed with: Patient.  Patient and/or family verbalized understanding of discharge instructions and all questions answered.  AVS to patient via [employer].  Patient will return 5/23/23 for next appointment.   Patient discharged in stable condition accompanied by: self.  Departure Mode: Ambulatory.      EMILY WICK RN

## 2023-05-02 NOTE — NURSING NOTE
Chief Complaint   Patient presents with     Port Draw     Port labs     Port accessed by lab RN with 20g 3/4inch power needle. Labs drawn without incident. Port saline flushed and heparin locked. Kept accessed for infusion. Vitals checked and pt checked in to next appointment.     Taisha Mistry RN on 5/2/2023 at 1:43 PM

## 2023-05-03 LAB — CANCER AG27-29 SERPL-ACNC: 12.6 U/ML

## 2023-05-04 ENCOUNTER — PATIENT OUTREACH (OUTPATIENT)
Dept: GERIATRIC MEDICINE | Facility: CLINIC | Age: 68
End: 2023-05-04
Payer: COMMERCIAL

## 2023-05-04 NOTE — LETTER
May 4, 2023    NATASHA LOUIS  [address]   [place] [address]    Dear Natasha:     I m your care coordinator. I ve been unable to reach you by phone. I am writing to ask you or your authorized representative to call me at [phone]. If you reach my voicemail, leave a message with your daytime phone number. Include a date and time that I can call you. If you are hearing impaired, call the [place] [employer]ay at [phone] or [phone] (svozbw-wl-[id number] relay service).    The reason I am trying to reach you is:     [] To schedule an assessment  [x] For your six (6)-month check-in  [] Other:      Please call me as soon as you receive this letter. I look forward to speaking with you.    Sincerely,    Lakesha Ventura RN, BSN, PHN  [phone]  [email or web address]            [id number] accepted  [id number]                                                                        B  (08/2022)

## 2023-05-04 NOTE — PROGRESS NOTES
"[facility] Care Coordination Contact    Per CC, mailed client an \"Unable to Contact\" letter.      Chey Hanley  Case Management Specialist  [facility]  [phone]    "

## 2023-05-12 ENCOUNTER — PATIENT OUTREACH (OUTPATIENT)
Dept: GERIATRIC MEDICINE | Facility: CLINIC | Age: 68
End: 2023-05-12
Payer: COMMERCIAL

## 2023-05-12 NOTE — PROGRESS NOTES
[facility] Care Coordination Contact    Called member to complete six month assessment and completed 4 attempts- left a messages with  requesting a return call and mailed UTR letter.     Lakesha Ventura RN  [facility]  [phone]

## 2023-05-22 NOTE — PROGRESS NOTES
Hoda is a patient from [facility] and is seen here for continuation of care for her metastatic ER+HER2- breast cancer.  She is a refugee from [facility] and was initially seen in clinic with her daughter.  The only data we have from [facility] is an English translation of her records.       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  On 02/11/2014, she was seen by an oncologist.  The clinical staging of T4b N2 M1 was noted.   Her breast cancer was on the left side. There was no right breast cancer, confirmed by the patient and her daughter, correcting a possible error in the translated records.  ER was positive in 100% of the cells, DC at 14% and HER2 was 3+ positive.  It appears that this histopathologic information is on the mastectomy specimen. She underwent an MRI for staging of presumptive bone metastases which was performed 03/02/2014.  There were skeletal metastases in the thoracic vertebrae at 12, L1, L3, L5 and S1 vertebral body, ranging in size from 0.7-3.0 cm in size.  Ischial and right femur were also involved, as well as a large iliac mass measuring about 15 cm in the uterus. 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   Radiation Oncology consultation was performed 03/11/2014, and she was given radiation in 1 dose of 6 Gy to the right iliac lesion.        With the initial diagnosis, she initiated treatment with 6 cycles of CAF neoadjuvant chemotherapy 02/14, 07/07, 03/28, 04/18/14, 05/08 and 05/29/14. She also received monthly zoledronic acid.  She then underwent a modified left mastectomy of Palacios type and left lymphadenoectomy on 06/27/2014.   Pathologic examination showed number at [facility] was [id number] showed infiltrative grade 2 ductal cancer with 6 level 1 metastatic lympFollow up with Jossie for visits and trastuzumab on 2-5, 2-26, 3-19 with CBC, CMP.  Echocardiogram on 3-19.  Follow up with me 4-9 with CBC,  CMP, CA27.29 and CEA and with CT CAP on 4-8.h nodes and 3 level 2 metastatic lymph nodes.  The differentiation was intermediate.  The tumor was ER positive 70% of the cells, OK positive in 40% of the cells and HER2 was 3+ by immunohistochemistry and the Ki-67 labeling index was 20%. Her staging after surgery was stage IV, pT4b N2 M1.  I don't see a biopsy of the skeletal metastases.  She then had continuation with chemotherapy with 4 cycles of Herceptin and taxane with monthly zoledronic acid.  Tamoxifen was initiated.  She then had two years of Herceptin and a decision was made not to continue further Herceptin.  She continued on zoledronic acid every 3 months. She was clinically stable. [place] then [place] the [place] new refugee from [facility].  She has now been changed to letrozole.  Denosumab has now been held for new diagnosis of osteonecrosis of the R maxilla.     History of cholecystectomy 2020.      TREATMENT HISTORY:  A. Initial diagnosis with metastatic breast cancer in [facility].  Neoadjuvant CAF x 6.   B. Left mastectomy. Left axillary node dissection.  C.  Radiation in 1 dose to R iliac region.  C. Herceptin for 2 years taxane for a prescribed course then stopped, monthly zoledronic acid.  She had 2 years of Herceptin with tamoxifen added after chemotherapy.  D.  Tamoxifen alone and zoledronic acid every 3 months starting 2014.  Letrozole was started   [place]  [place]  We restarted Herceptin every 3 weeks and continued tamoxifen. Bone targeted agent changed to denosumab every 9 weeks. Zometa discontinued 2017.  Tamoxifen was changed to letrozole August 2019.    F.  Xgeva discontinued 12-17-19 because of dental issues.   G.  J+J vaccine March, 2021.  H.  Was on Zometa once May 11.  Reaction with eye lid swelling. Discontinued Zometal  H.  Restart Xgeva 6-22-21.  Continuing the trastuzumab and letrozole.  Both tolerated well.       INTERVAL HISTORY  Hoda returns to clinic and is doing quite well. Appetite  is normal. Energy levels are good. She has no pain, fatigue, depression or anxiety. No tooth pain. Dentist appt was on Fri 5/19, everything was well. No new lumps or bumps. No side effects with the medications.      REVIEW OF SYSTEMS:  A 10-point review of systems is negative.     She has no further gallbladder issues, no right upper quadrant pain and no abdominal discomfort with fatty meals.  She is eating a healthful diet, Mediterranean in style.  She is not consuming alcohol.  She exercises regularly.     She is taking vitamin D 2000 International Units daily.     PHYSICAL EXAMINATION:    /72   Pulse 82   Temp 97.9  F (36.6  C) (Oral)   Resp 16   Wt 82 kg (180 lb 11.2 oz)   SpO2 96%   BMI 32.27 kg/m    GENERAL:  Hoda appeared generally well.  HEENT:  There is no alopecia.  Examination of the oropharynx is without lesions.  She has an upper plate in place.  The remainder of her dentition looks good.  LYMPHATIC:  She has no cervical, supraclavicular, subclavicular or axillary lymphadenopathy.  CHEST: The port site in the right upper chest is without erythema or masses.  LUNGS:  Clear to percussion and auscultation.  HEART:  Regular rate and rhythm.  S1, S2.  ABDOMEN:  Soft, nontender, without hepatosplenomegaly.  EXTREMITIES:  Without edema.  PSYCHIATRIC:  Mood and affect were normal.     LABORATORY DATA:  CMP and CBC within normal limits.  CEA, CA27.29 pending.     CT Chest/Abdomen/Pelvis 4/12/23  IMPRESSION:  1.  Stable appearance of sclerotic osseous lesions.  2.  Stable small pulmonary nodules.  3.  No new disease in the chest, abdomen and pelvis.        ASSESSMENT AND PLAN:       1.  Hoda Brush is a 68-year-old woman with a history of ER-positive, NC-positive, HER2 positive breast cancer.  She is from [facility], formally from [facility], and came to live in the [place] with [place].  The tumor is ER positive, NC positive and HER2 positive.  She had metastatic disease at the time of  presentation with bone-only metastases by report.  She underwent neoadjuvant CAF, had a left mastectomy, left axillary lymph node dissection, radiation to the right hip, which included the right iliac region where she had metastatic disease.  She was initially on tamoxifen but then was switched to letrozole.  She continues on this and every 3-week trastuzumab.  She has had no evidence of disease progression for the last 3 years.  Markers are low and stable.  We have continued Herceptin every 3 weeks as well as daily letrozole. CT CAP shows stable pulmonary nodules.   2.  Hoda Brush continues to do well on a combination of trastuzumab and letrozole. She has no evidence of disease progression.   Her ejection fraction remains stable.  The right maxillary osteonecrosis has healed and she is able to continue with Xgeva every 3 months.  All of her questions were answered.She continues to do well on her regimen of trastuzumab and letrozole.  She has no joint discomfort with the letrozole and no hot flashes.  Her CT scan is stable and the plan was to repeat the CT scan 4 months.  3.  Continue the letrozole.  Letrozole has been well-tolerated.  No significant joint stiffness.  4. COVID vaccination was recommended with a booster.  She has had Kelton and Kelton and 2 boosters prior, but the last booster was more than 4 months ago.  All of her questions were answered.  4.  Echo. Stable EF. --Echo in  showed normal EF with slight decline 55-60%.    5.  Discussion of bone health and right maxillary osteonecrosis.  All healed.   Xgeva is being continued.   6. Follow up.   June 16 with CBC, CMP CA27.29 and CEA and trastuzumab. July 6 CBC, CMP, Kassidy, trastuzumab. July 11 echocardiogram. July 27 follow up with me with CBC, CMP, trastuzumab. August 16 with CT CAP CBC, CMP, CA27.29 and CEA. August 17 follow up with me with trastuzumab, Xgeva.      Thank you for allowing us to participate in this patient's care.  The patient  was seen and evaluated by me.  I discussed the patient with the resident and agree with the findings and plan in the note, which was edited by me.    Sincerely,     Lisandro Aguiar MD  Professor  [facility]  [phone].            I spent 25 minutes with the patient more than 50% of which was in counseling and coordination of care.

## 2023-05-23 ENCOUNTER — APPOINTMENT (OUTPATIENT)
Dept: LAB | Facility: CLINIC | Age: 68
End: 2023-05-23
Attending: INTERNAL MEDICINE
Payer: COMMERCIAL

## 2023-05-23 ENCOUNTER — INFUSION THERAPY VISIT (OUTPATIENT)
Dept: ONCOLOGY | Facility: CLINIC | Age: 68
End: 2023-05-23
Attending: INTERNAL MEDICINE
Payer: COMMERCIAL

## 2023-05-23 VITALS
WEIGHT: 180.7 LBS | RESPIRATION RATE: 16 BRPM | SYSTOLIC BLOOD PRESSURE: 107 MMHG | TEMPERATURE: 97.9 F | BODY MASS INDEX: 32.27 KG/M2 | OXYGEN SATURATION: 96 % | DIASTOLIC BLOOD PRESSURE: 72 MMHG | HEART RATE: 82 BPM

## 2023-05-23 DIAGNOSIS — C50.912 MALIGNANT NEOPLASM OF LEFT FEMALE BREAST, UNSPECIFIED ESTROGEN RECEPTOR STATUS, UNSPECIFIED SITE OF BREAST (H): ICD-10-CM

## 2023-05-23 DIAGNOSIS — C79.51 MALIGNANT NEOPLASM METASTATIC TO BONE (H): Primary | ICD-10-CM

## 2023-05-23 DIAGNOSIS — C79.51 MALIGNANT NEOPLASM METASTATIC TO BONE (H): ICD-10-CM

## 2023-05-23 DIAGNOSIS — C50.912 MALIGNANT NEOPLASM OF LEFT FEMALE BREAST, UNSPECIFIED ESTROGEN RECEPTOR STATUS, UNSPECIFIED SITE OF BREAST (H): Primary | ICD-10-CM

## 2023-05-23 DIAGNOSIS — Z51.11 ENCOUNTER FOR ANTINEOPLASTIC CHEMOTHERAPY: ICD-10-CM

## 2023-05-23 LAB
ALBUMIN SERPL BCG-MCNC: 4 G/DL (ref 3.5–5.2)
ALP SERPL-CCNC: 50 U/L (ref 35–104)
ALT SERPL W P-5'-P-CCNC: 35 U/L (ref 10–35)
ANION GAP SERPL CALCULATED.3IONS-SCNC: 8 MMOL/L (ref 7–15)
AST SERPL W P-5'-P-CCNC: 33 U/L (ref 10–35)
BASOPHILS # BLD AUTO: 0 10E3/UL (ref 0–0.2)
BASOPHILS NFR BLD AUTO: 1 %
BILIRUB SERPL-MCNC: 0.3 MG/DL
BUN SERPL-MCNC: 12.2 MG/DL (ref 8–23)
CALCIUM SERPL-MCNC: 9.2 MG/DL (ref 8.8–10.2)
CEA SERPL-MCNC: 1.2 NG/ML
CHLORIDE SERPL-SCNC: 106 MMOL/L (ref 98–107)
CREAT SERPL-MCNC: 0.8 MG/DL (ref 0.51–0.95)
DEPRECATED HCO3 PLAS-SCNC: 28 MMOL/L (ref 22–29)
EOSINOPHIL # BLD AUTO: 0.2 10E3/UL (ref 0–0.7)
EOSINOPHIL NFR BLD AUTO: 3 %
ERYTHROCYTE [DISTWIDTH] IN BLOOD BY AUTOMATED COUNT: 13.4 % (ref 10–15)
GFR SERPL CREATININE-BSD FRML MDRD: 80 ML/MIN/1.73M2
GLUCOSE SERPL-MCNC: 97 MG/DL (ref 70–99)
HCT VFR BLD AUTO: 40.1 % (ref 35–47)
HGB BLD-MCNC: 12.6 G/DL (ref 11.7–15.7)
IMM GRANULOCYTES # BLD: 0 10E3/UL
IMM GRANULOCYTES NFR BLD: 0 %
LYMPHOCYTES # BLD AUTO: 2.5 10E3/UL (ref 0.8–5.3)
LYMPHOCYTES NFR BLD AUTO: 39 %
MCH RBC QN AUTO: 29.4 PG (ref 26.5–33)
MCHC RBC AUTO-ENTMCNC: 31.4 G/DL (ref 31.5–36.5)
MCV RBC AUTO: 94 FL (ref 78–100)
MONOCYTES # BLD AUTO: 0.5 10E3/UL (ref 0–1.3)
MONOCYTES NFR BLD AUTO: 8 %
NEUTROPHILS # BLD AUTO: 3.2 10E3/UL (ref 1.6–8.3)
NEUTROPHILS NFR BLD AUTO: 49 %
NRBC # BLD AUTO: 0 10E3/UL
NRBC BLD AUTO-RTO: 0 /100
PLATELET # BLD AUTO: 241 10E3/UL (ref 150–450)
POTASSIUM SERPL-SCNC: 4.2 MMOL/L (ref 3.4–5.3)
PROT SERPL-MCNC: 7.5 G/DL (ref 6.4–8.3)
RBC # BLD AUTO: 4.29 10E6/UL (ref 3.8–5.2)
SODIUM SERPL-SCNC: 142 MMOL/L (ref 136–145)
WBC # BLD AUTO: 6.4 10E3/UL (ref 4–11)

## 2023-05-23 PROCEDURE — 36591 DRAW BLOOD OFF VENOUS DEVICE: CPT | Performed by: INTERNAL MEDICINE

## 2023-05-23 PROCEDURE — 250N000011 HC RX IP 250 OP 636: Performed by: INTERNAL MEDICINE

## 2023-05-23 PROCEDURE — 82378 CARCINOEMBRYONIC ANTIGEN: CPT | Performed by: INTERNAL MEDICINE

## 2023-05-23 PROCEDURE — 85025 COMPLETE CBC W/AUTO DIFF WBC: CPT | Performed by: INTERNAL MEDICINE

## 2023-05-23 PROCEDURE — 80053 COMPREHEN METABOLIC PANEL: CPT | Performed by: INTERNAL MEDICINE

## 2023-05-23 PROCEDURE — 99214 OFFICE O/P EST MOD 30 MIN: CPT | Performed by: INTERNAL MEDICINE

## 2023-05-23 PROCEDURE — 96372 THER/PROPH/DIAG INJ SC/IM: CPT | Performed by: INTERNAL MEDICINE

## 2023-05-23 PROCEDURE — G0463 HOSPITAL OUTPT CLINIC VISIT: HCPCS | Mod: 25 | Performed by: INTERNAL MEDICINE

## 2023-05-23 PROCEDURE — 258N000003 HC RX IP 258 OP 636: Performed by: INTERNAL MEDICINE

## 2023-05-23 PROCEDURE — 96413 CHEMO IV INFUSION 1 HR: CPT

## 2023-05-23 PROCEDURE — 86300 IMMUNOASSAY TUMOR CA 15-3: CPT | Performed by: INTERNAL MEDICINE

## 2023-05-23 PROCEDURE — 82310 ASSAY OF CALCIUM: CPT | Performed by: INTERNAL MEDICINE

## 2023-05-23 PROCEDURE — 96372 THER/PROPH/DIAG INJ SC/IM: CPT | Mod: 59 | Performed by: INTERNAL MEDICINE

## 2023-05-23 RX ORDER — HEPARIN SODIUM (PORCINE) LOCK FLUSH IV SOLN 100 UNIT/ML 100 UNIT/ML
5 SOLUTION INTRAVENOUS EVERY 8 HOURS
Status: DISCONTINUED | OUTPATIENT
Start: 2023-05-23 | End: 2023-05-23 | Stop reason: HOSPADM

## 2023-05-23 RX ORDER — ALBUTEROL SULFATE 90 UG/1
1-2 AEROSOL, METERED RESPIRATORY (INHALATION)
Status: CANCELLED
Start: 2023-05-23

## 2023-05-23 RX ORDER — HEPARIN SODIUM (PORCINE) LOCK FLUSH IV SOLN 100 UNIT/ML 100 UNIT/ML
5 SOLUTION INTRAVENOUS EVERY 8 HOURS
Status: CANCELLED | OUTPATIENT
Start: 2023-05-23

## 2023-05-23 RX ORDER — SODIUM CHLORIDE 9 MG/ML
1000 INJECTION, SOLUTION INTRAVENOUS CONTINUOUS PRN
Status: CANCELLED
Start: 2023-05-23

## 2023-05-23 RX ORDER — ALBUTEROL SULFATE 0.83 MG/ML
2.5 SOLUTION RESPIRATORY (INHALATION)
Status: CANCELLED | OUTPATIENT
Start: 2023-05-23

## 2023-05-23 RX ORDER — DIPHENHYDRAMINE HYDROCHLORIDE 50 MG/ML
50 INJECTION INTRAMUSCULAR; INTRAVENOUS
Status: CANCELLED
Start: 2023-05-23

## 2023-05-23 RX ORDER — ACETAMINOPHEN 325 MG/1
650 TABLET ORAL
Status: CANCELLED | OUTPATIENT
Start: 2023-05-23

## 2023-05-23 RX ORDER — LORAZEPAM 2 MG/ML
0.5 INJECTION INTRAMUSCULAR EVERY 4 HOURS PRN
Status: CANCELLED
Start: 2023-05-23

## 2023-05-23 RX ORDER — EPINEPHRINE 1 MG/ML
0.3 INJECTION, SOLUTION INTRAMUSCULAR; SUBCUTANEOUS EVERY 5 MIN PRN
Status: CANCELLED | OUTPATIENT
Start: 2023-05-23

## 2023-05-23 RX ORDER — METHYLPREDNISOLONE SODIUM SUCCINATE 125 MG/2ML
125 INJECTION, POWDER, LYOPHILIZED, FOR SOLUTION INTRAMUSCULAR; INTRAVENOUS
Status: CANCELLED
Start: 2023-05-23

## 2023-05-23 RX ORDER — HEPARIN SODIUM (PORCINE) LOCK FLUSH IV SOLN 100 UNIT/ML 100 UNIT/ML
5 SOLUTION INTRAVENOUS DAILY PRN
Status: DISCONTINUED | OUTPATIENT
Start: 2023-05-23 | End: 2023-05-24 | Stop reason: HOSPADM

## 2023-05-23 RX ORDER — EPINEPHRINE 0.3 MG/.3ML
0.3 INJECTION SUBCUTANEOUS EVERY 5 MIN PRN
Status: CANCELLED | OUTPATIENT
Start: 2023-05-23

## 2023-05-23 RX ORDER — DIPHENHYDRAMINE HCL 25 MG
50 CAPSULE ORAL ONCE
Status: CANCELLED
Start: 2023-05-23

## 2023-05-23 RX ADMIN — TRASTUZUMAB 450 MG: 150 INJECTION, POWDER, LYOPHILIZED, FOR SOLUTION INTRAVENOUS at 11:13

## 2023-05-23 RX ADMIN — Medication 5 ML: at 11:44

## 2023-05-23 RX ADMIN — SODIUM CHLORIDE 250 ML: 9 INJECTION, SOLUTION INTRAVENOUS at 11:13

## 2023-05-23 RX ADMIN — DENOSUMAB 120 MG: 120 INJECTION SUBCUTANEOUS at 10:35

## 2023-05-23 RX ADMIN — Medication 5 ML: at 09:16

## 2023-05-23 ASSESSMENT — PAIN SCALES - GENERAL: PAINLEVEL: NO PAIN (0)

## 2023-05-23 NOTE — PROGRESS NOTES
Infusion Nursing Note:  Hoda Brush presents today for cycle 98, day 1 herceptin xgeva.    Patient seen by provider today: Yes: Dr Aguiar   present during visit today: Not Applicable.    Note: N/A.      Intravenous Access:  Implanted Port.    Treatment Conditions:  Lab Results   Component Value Date    HGB 12.6 05/23/2023    WBC 6.4 05/23/2023    ANEU 3.1 06/22/2021    ANEUTAUTO 3.2 05/23/2023     05/23/2023      Lab Results   Component Value Date     05/23/2023    POTASSIUM 4.2 05/23/2023    CR 0.80 05/23/2023    TAYLER 9.2 05/23/2023    BILITOTAL 0.3 05/23/2023    ALBUMIN 4.0 05/23/2023    ALT 35 05/23/2023    AST 33 05/23/2023     Results reviewed, labs MET treatment parameters, ok to proceed with treatment.  ECHO/MUGA completed 4/12/23  EF 55-60%.      Post Infusion Assessment:  Patient tolerated infusion without incident.  Patient tolerated injection without incident. Xgeva given subcutaneously into RIGHT arm  Blood return noted pre and post infusion.  Site patent and intact, free from redness, edema or discomfort.  No evidence of extravasations.  Access discontinued per protocol.       Discharge Plan:   Patient declined prescription refills.  Discharge instructions reviewed with: Patient.  Patient and/or family verbalized understanding of discharge instructions and all questions answered.  AVS to patient via jaeyosHART.  Patient will return 6/16/23 for next appointment.   Patient discharged in stable condition accompanied by: self.  Departure Mode: Ambulatory.      Ashlyn Banda RN

## 2023-05-23 NOTE — LETTER
5/23/2023         RE: Hoda Brush  [address]  [place] [address]        Dear Colleague,    Thank you for referring your patient, Hoda Brush, to the [facility]. Please see a copy of my visit note below.    Hoda is a patient from [facility] and is seen here for continuation of care for her metastatic ER+HER2- breast cancer.  She is a refugee from [facility] and was initially seen in clinic with her daughter.  The only data we have from [facility] is an English translation of her records.       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  On 02/11/2014, she was seen by an oncologist.  The clinical staging of T4b N2 M1 was noted.   Her breast cancer was on the left side. There was no right breast cancer, confirmed by the patient and her daughter, correcting a possible error in the translated records.  ER was positive in 100% of the cells, NV at 14% and HER2 was 3+ positive.  It appears that this histopathologic information is on the mastectomy specimen. She underwent an MRI for staging of presumptive bone metastases which was performed 03/02/2014.  There were skeletal metastases in the thoracic vertebrae at 12, L1, L3, L5 and S1 vertebral body, ranging in size from 0.7-3.0 cm in size.  Ischial and right femur were also involved, as well as a large iliac mass measuring about 15 cm in the uterus. 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   Radiation Oncology consultation was performed 03/11/2014, and she was given radiation in 1 dose of 6 Gy to the right iliac lesion.        With the initial diagnosis, she initiated treatment with 6 cycles of CAF neoadjuvant chemotherapy 02/14, 07/07, 03/28, 04/18/14, 05/08 and 05/29/14. She also received monthly zoledronic acid.  She then underwent a modified left mastectomy of Palacios type and left lymphadenoectomy on 06/27/2014.   Pathologic  examination showed number at [facility] was [id number]/14 showed infiltrative grade 2 ductal cancer with 6 level 1 metastatic lympFollow up with Jossie for visits and trastuzumab on 2-5, 2-26, 3-19 with CBC, CMP.  Echocardiogram on 3-19.  Follow up with me 4-9 with CBC, CMP, CA27.29 and CEA and with CT CAP on 4-8.h nodes and 3 level 2 metastatic lymph nodes.  The differentiation was intermediate.  The tumor was ER positive 70% of the cells, TN positive in 40% of the cells and HER2 was 3+ by immunohistochemistry and the Ki-67 labeling index was 20%. Her staging after surgery was stage IV, pT4b N2 M1.  I don't see a biopsy of the skeletal metastases.  She then had continuation with chemotherapy with 4 cycles of Herceptin and taxane with monthly zoledronic acid.  Tamoxifen was initiated.  She then had two years of Herceptin and a decision was made not to continue further Herceptin.  She continued on zoledronic acid every 3 months. She was clinically stable. [place] then moved [place] the [place] new refugee from [facility].  She has now been changed to letrozole.  Denosumab has now been held for new diagnosis of osteonecrosis of the R maxilla.     History of cholecystectomy 2020.      TREATMENT HISTORY:  A. Initial diagnosis with metastatic breast cancer in [facility].  Neoadjuvant CAF x 6.   B. Left mastectomy. Left axillary node dissection.  C.  Radiation in 1 dose to R iliac region.  C. Herceptin for 2 years taxane for a prescribed course then stopped, monthly zoledronic acid.  She had 2 years of Herceptin with tamoxifen added after chemotherapy.  D.  Tamoxifen alone and zoledronic acid every 3 months starting 2014.  Letrozole was started   E.  [place]  We restarted Herceptin every 3 weeks and continued tamoxifen. Bone targeted agent changed to denosumab every 9 weeks. Zometa discontinued 2017.  Tamoxifen was changed to letrozole August 2019.    GIANLUCA  Xgeva discontinued 12-17-19 because of dental issues.   G.  J+J vaccine  March, 2021.  H.  Was on Zometa once May 11.  Reaction with eye lid swelling. Discontinued Zometal  H.  Restart Xgeva 6-22-21.  Continuing the trastuzumab and letrozole.  Both tolerated well.       INTERVAL HISTORY  Hdoa returns to clinic and is doing quite well. Appetite is normal. Energy levels are good. She has no pain, fatigue, depression or anxiety. No tooth pain. Dentist appt was on Fri 5/19, everything was well. No new lumps or bumps. No side effects with the medications.      REVIEW OF SYSTEMS:  A 10-point review of systems is negative.     She has no further gallbladder issues, no right upper quadrant pain and no abdominal discomfort with fatty meals.  She is eating a healthful diet, Mediterranean in style.  She is not consuming alcohol.  She exercises regularly.     She is taking vitamin D 2000 International Units daily.     PHYSICAL EXAMINATION:    /72   Pulse 82   Temp 97.9  F (36.6  C) (Oral)   Resp 16   Wt 82 kg (180 lb 11.2 oz)   SpO2 96%   BMI 32.27 kg/m    GENERAL:  Hoda appeared generally well.  HEENT:  There is no alopecia.  Examination of the oropharynx is without lesions.  She has an upper plate in place.  The remainder of her dentition looks good.  LYMPHATIC:  She has no cervical, supraclavicular, subclavicular or axillary lymphadenopathy.  CHEST: The port site in the right upper chest is without erythema or masses.  LUNGS:  Clear to percussion and auscultation.  HEART:  Regular rate and rhythm.  S1, S2.  ABDOMEN:  Soft, nontender, without hepatosplenomegaly.  EXTREMITIES:  Without edema.  PSYCHIATRIC:  Mood and affect were normal.     LABORATORY DATA:  CMP and CBC within normal limits.  CEA, CA27.29 pending.     CT Chest/Abdomen/Pelvis 4/12/23  IMPRESSION:  1.  Stable appearance of sclerotic osseous lesions.  2.  Stable small pulmonary nodules.  3.  No new disease in the chest, abdomen and pelvis.      ASSESSMENT AND PLAN:       1.  Hoda Brush is a 68-year-old woman  with a history of ER-positive, IA-positive, HER2 positive breast cancer.  She is from [facility], formally from [facility], and came to live in the [place] with [place].  The tumor is ER positive, IA positive and HER2 positive.  She had metastatic disease at the time of presentation with bone-only metastases by report.  She underwent neoadjuvant CAF, had a left mastectomy, left axillary lymph node dissection, radiation to the right hip, which included the right iliac region where she had metastatic disease.  She was initially on tamoxifen but then was switched to letrozole.  She continues on this and every 3-week trastuzumab.  She has had no evidence of disease progression for the last 3 years.  Markers are low and stable.  We have continued Herceptin every 3 weeks as well as daily letrozole. CT CAP shows stable pulmonary nodules.   2.  Hoda Brush continues to do well on a combination of trastuzumab and letrozole. She has no evidence of disease progression.   Her ejection fraction remains stable.  The right maxillary osteonecrosis has healed and she is able to continue with Xgeva every 3 months.  All of her questions were answered.She continues to do well on her regimen of trastuzumab and letrozole.  She has no joint discomfort with the letrozole and no hot flashes.  Her CT scan is stable and the plan was to repeat the CT scan 4 months.  3.  Continue the letrozole.  Letrozole has been well-tolerated.  No significant joint stiffness.  4. COVID vaccination was recommended with a booster.  She has had Kelton and Kelton and 2 boosters prior, but the last booster was more than 4 months ago.  All of her questions were answered.  4.  Echo. Stable EF. --Echo in  showed normal EF with slight decline 55-60%.    5.  Discussion of bone health and right maxillary osteonecrosis.  All healed.   Xgeva is being continued.   6. Follow up.   June 16 with CBC, CMP CA27.29 and CEA and trastuzumab. July 6 CBC, CMP, Kassidy,  trastuzumab. July 11 echocardiogram. July 27 follow up with me with CBC, CMP, trastuzumab. August 16 with CT CAP CBC, CMP, CA27.29 and CEA. August 17 follow up with me with trastuzumab, Xgeva.      Thank you for allowing us to participate in this patient's care.  The patient was seen and evaluated by me.  I discussed the patient with the resident and agree with the findings and plan in the note, which was edited by me.    Sincerely,     Lisandro Aguiar MD  Professor  [facility]  [phone].       I spent 25 minutes with the patient more than 50% of which was in counseling and coordination of care.

## 2023-05-23 NOTE — PATIENT INSTRUCTIONS
[facility] Triage and after hours / weekends / holidays:  [phone]    Please call the triage or after hours line if you experience a temperature greater than or equal to 100.4, shaking chills, have uncontrolled nausea, vomiting and/or diarrhea, dizziness, shortness of breath, chest pain, bleeding, unexplained bruising, or if you have any other new/concerning symptoms, questions or concerns.      If you are having any concerning symptoms or wish to speak to a provider before your next infusion visit, please call triage to notify them so we can adequately serve you.     If you need a refill on a narcotic prescription or other medication, please call before your infusion appointment.             May 2023      Jakub Monday Tuesday Wednesday Thursday Friday Saturday        1     2    LAB CENTRAL   1:00 PM   (15 min.)   [facility] DRAW   [facility]    ONC INFUSION 1 HR (60 MIN)   2:00 PM   (60 min.)    ONC INFUSION NURSE   [facility] 3     4     5     6       7     8     9     10     11     12     13       14     15     16     17     18     19     20       21     22     23    LAB CENTRAL   9:00 AM   (15 min.)   [facility]RAW   [facility]    RETURN CCSL   9:15 AM   (30 min.)   Lisandro Aguiar MD   [facility]    ONC INFUSION 1 HR (60 MIN)  10:00 AM   (60 min.)    ONC INFUSION NURSE   [facility] 24     25     26     27       28     29     30     31                                  June 2023 Sunday Monday Tuesday Wednesday Thursday Friday Saturday                       1     2     3       4     5     6     7     8     9     10       11     12     13     14     15     16    LAB CENTRAL   8:30 AM   (15 min.)   [facility]   [facility]    ONC INFUSION 1 HR (60 MIN)   9:00 AM   (60 min.)   [facility] ONC INFUSION NURSE     [facility] 17       18     19     20     21     22     23     24       25     26     27     28     29     30                             Recent Results (from the past 24 hour(s))   Comprehensive metabolic panel    Collection Time: 05/23/23  9:20 AM   Result Value Ref Range    Sodium 142 136 - 145 mmol/L    Potassium 4.2 3.4 - 5.3 mmol/L    Chloride 106 98 - 107 mmol/L    Carbon Dioxide (CO2) 28 22 - 29 mmol/L    Anion Gap 8 7 - 15 mmol/L    Urea Nitrogen 12.2 8.0 - 23.0 mg/dL    Creatinine 0.80 0.51 - 0.95 mg/dL    Calcium 9.2 8.8 - 10.2 mg/dL    Glucose 97 70 - 99 mg/dL    Alkaline Phosphatase 50 35 - 104 U/L    AST 33 10 - 35 U/L    ALT 35 10 - 35 U/L    Protein Total 7.5 6.4 - 8.3 g/dL    Albumin 4.0 3.5 - 5.2 g/dL    Bilirubin Total 0.3 <=1.2 mg/dL    GFR Estimate 80 >60 mL/min/1.73m2   CBC with platelets and differential    Collection Time: 05/23/23  9:20 AM   Result Value Ref Range    WBC Count 6.4 4.0 - 11.0 10e3/uL    RBC Count 4.29 3.80 - 5.20 10e6/uL    Hemoglobin 12.6 11.7 - 15.7 g/dL    Hematocrit 40.1 35.0 - 47.0 %    MCV 94 78 - 100 fL    MCH 29.4 26.5 - 33.0 pg    MCHC 31.4 (L) 31.5 - 36.5 g/dL    RDW 13.4 10.0 - 15.0 %    Platelet Count 241 150 - 450 10e3/uL    % Neutrophils 49 %    % Lymphocytes 39 %    % Monocytes 8 %    % Eosinophils 3 %    % Basophils 1 %    % Immature Granulocytes 0 %    NRBCs per 100 WBC 0 <1 /100    Absolute Neutrophils 3.2 1.6 - 8.3 10e3/uL    Absolute Lymphocytes 2.5 0.8 - 5.3 10e3/uL    Absolute Monocytes 0.5 0.0 - 1.3 10e3/uL    Absolute Eosinophils 0.2 0.0 - 0.7 10e3/uL    Absolute Basophils 0.0 0.0 - 0.2 10e3/uL    Absolute Immature Granulocytes 0.0 <=0.4 10e3/uL    Absolute NRBCs 0.0 10e3/uL

## 2023-05-23 NOTE — NURSING NOTE
"Oncology Rooming Note    May 23, 2023 9:34 AM   Hoda Brush is a 68 year old female who presents for:    Chief Complaint   Patient presents with     Port Draw     Labs drawn via port by RN in lab. VS taken.      Oncology Clinic Visit     Malignant neoplasm of left female breast     Initial Vitals: /72   Pulse 82   Temp 97.9  F (36.6  C) (Oral)   Resp 16   Wt 82 kg (180 lb 11.2 oz)   SpO2 96%   BMI 32.27 kg/m   Estimated body mass index is 32.27 kg/m  as calculated from the following:    Height as of 9/12/22: 1.594 m (5' 2.75\").    Weight as of this encounter: 82 kg (180 lb 11.2 oz). Body surface area is 1.91 meters squared.  No Pain (0) Comment: Data Unavailable   No LMP recorded. Patient is postmenopausal.  Allergies reviewed: Yes  Medications reviewed: Yes    Medications: Medication refills not needed today.  Pharmacy name entered into Proviation: [id number] - [place], [place] - [address]    Clinical concerns: No new clinical concerns other than reason for visit today.     Tamika Medina, EMT  "

## 2023-05-24 LAB — CANCER AG27-29 SERPL-ACNC: <9 U/ML

## 2023-06-13 RX ORDER — EPINEPHRINE 1 MG/ML
0.3 INJECTION, SOLUTION INTRAMUSCULAR; SUBCUTANEOUS EVERY 5 MIN PRN
Status: CANCELLED | OUTPATIENT
Start: 2023-06-13

## 2023-06-13 RX ORDER — ALBUTEROL SULFATE 90 UG/1
1-2 AEROSOL, METERED RESPIRATORY (INHALATION)
Status: CANCELLED
Start: 2023-07-06

## 2023-06-13 RX ORDER — ALBUTEROL SULFATE 0.83 MG/ML
2.5 SOLUTION RESPIRATORY (INHALATION)
Status: CANCELLED | OUTPATIENT
Start: 2023-06-13

## 2023-06-13 RX ORDER — EPINEPHRINE 0.3 MG/.3ML
0.3 INJECTION SUBCUTANEOUS EVERY 5 MIN PRN
Status: CANCELLED | OUTPATIENT
Start: 2023-06-13

## 2023-06-13 RX ORDER — LORAZEPAM 2 MG/ML
0.5 INJECTION INTRAMUSCULAR EVERY 4 HOURS PRN
Status: CANCELLED
Start: 2023-07-06

## 2023-06-13 RX ORDER — ACETAMINOPHEN 325 MG/1
650 TABLET ORAL
Status: CANCELLED | OUTPATIENT
Start: 2023-07-06

## 2023-06-13 RX ORDER — DIPHENHYDRAMINE HCL 25 MG
50 CAPSULE ORAL ONCE
Status: CANCELLED
Start: 2023-07-06

## 2023-06-13 RX ORDER — EPINEPHRINE 1 MG/ML
0.3 INJECTION, SOLUTION INTRAMUSCULAR; SUBCUTANEOUS EVERY 5 MIN PRN
Status: CANCELLED | OUTPATIENT
Start: 2023-07-06

## 2023-06-13 RX ORDER — HEPARIN SODIUM (PORCINE) LOCK FLUSH IV SOLN 100 UNIT/ML 100 UNIT/ML
5 SOLUTION INTRAVENOUS EVERY 8 HOURS
Status: CANCELLED | OUTPATIENT
Start: 2023-07-06

## 2023-06-13 RX ORDER — SODIUM CHLORIDE 9 MG/ML
1000 INJECTION, SOLUTION INTRAVENOUS CONTINUOUS PRN
Status: CANCELLED
Start: 2023-06-13

## 2023-06-13 RX ORDER — LORAZEPAM 2 MG/ML
0.5 INJECTION INTRAMUSCULAR EVERY 4 HOURS PRN
Status: CANCELLED
Start: 2023-06-13

## 2023-06-13 RX ORDER — HEPARIN SODIUM (PORCINE) LOCK FLUSH IV SOLN 100 UNIT/ML 100 UNIT/ML
5 SOLUTION INTRAVENOUS EVERY 8 HOURS
Status: CANCELLED | OUTPATIENT
Start: 2023-06-13

## 2023-06-13 RX ORDER — DIPHENHYDRAMINE HYDROCHLORIDE 50 MG/ML
50 INJECTION INTRAMUSCULAR; INTRAVENOUS
Status: CANCELLED
Start: 2023-07-06

## 2023-06-13 RX ORDER — ALBUTEROL SULFATE 0.83 MG/ML
2.5 SOLUTION RESPIRATORY (INHALATION)
Status: CANCELLED | OUTPATIENT
Start: 2023-07-06

## 2023-06-13 RX ORDER — DIPHENHYDRAMINE HYDROCHLORIDE 50 MG/ML
50 INJECTION INTRAMUSCULAR; INTRAVENOUS
Status: CANCELLED
Start: 2023-06-13

## 2023-06-13 RX ORDER — ALBUTEROL SULFATE 90 UG/1
1-2 AEROSOL, METERED RESPIRATORY (INHALATION)
Status: CANCELLED
Start: 2023-06-13

## 2023-06-13 RX ORDER — METHYLPREDNISOLONE SODIUM SUCCINATE 125 MG/2ML
125 INJECTION, POWDER, LYOPHILIZED, FOR SOLUTION INTRAMUSCULAR; INTRAVENOUS
Status: CANCELLED
Start: 2023-07-06

## 2023-06-13 RX ORDER — METHYLPREDNISOLONE SODIUM SUCCINATE 125 MG/2ML
125 INJECTION, POWDER, LYOPHILIZED, FOR SOLUTION INTRAMUSCULAR; INTRAVENOUS
Status: CANCELLED
Start: 2023-06-13

## 2023-06-13 RX ORDER — DIPHENHYDRAMINE HCL 25 MG
50 CAPSULE ORAL ONCE
Status: CANCELLED
Start: 2023-06-13

## 2023-06-13 RX ORDER — EPINEPHRINE 0.3 MG/.3ML
0.3 INJECTION SUBCUTANEOUS EVERY 5 MIN PRN
Status: CANCELLED | OUTPATIENT
Start: 2023-07-06

## 2023-06-13 RX ORDER — SODIUM CHLORIDE 9 MG/ML
1000 INJECTION, SOLUTION INTRAVENOUS CONTINUOUS PRN
Status: CANCELLED
Start: 2023-07-06

## 2023-06-13 RX ORDER — ACETAMINOPHEN 325 MG/1
650 TABLET ORAL
Status: CANCELLED | OUTPATIENT
Start: 2023-06-13

## 2023-06-16 ENCOUNTER — INFUSION THERAPY VISIT (OUTPATIENT)
Dept: ONCOLOGY | Facility: CLINIC | Age: 68
End: 2023-06-16
Attending: INTERNAL MEDICINE
Payer: COMMERCIAL

## 2023-06-16 ENCOUNTER — APPOINTMENT (OUTPATIENT)
Dept: LAB | Facility: CLINIC | Age: 68
End: 2023-06-16
Attending: INTERNAL MEDICINE
Payer: COMMERCIAL

## 2023-06-16 VITALS
OXYGEN SATURATION: 96 % | WEIGHT: 183.7 LBS | TEMPERATURE: 97.7 F | BODY MASS INDEX: 32.8 KG/M2 | SYSTOLIC BLOOD PRESSURE: 110 MMHG | DIASTOLIC BLOOD PRESSURE: 72 MMHG | HEART RATE: 68 BPM | RESPIRATION RATE: 18 BRPM

## 2023-06-16 DIAGNOSIS — C50.912 MALIGNANT NEOPLASM OF LEFT FEMALE BREAST, UNSPECIFIED ESTROGEN RECEPTOR STATUS, UNSPECIFIED SITE OF BREAST (H): Primary | ICD-10-CM

## 2023-06-16 LAB
ALBUMIN SERPL BCG-MCNC: 3.9 G/DL (ref 3.5–5.2)
ALP SERPL-CCNC: 43 U/L (ref 35–104)
ALT SERPL W P-5'-P-CCNC: 47 U/L (ref 0–50)
ANION GAP SERPL CALCULATED.3IONS-SCNC: 8 MMOL/L (ref 7–15)
AST SERPL W P-5'-P-CCNC: 40 U/L (ref 0–45)
BASOPHILS # BLD AUTO: 0 10E3/UL (ref 0–0.2)
BASOPHILS NFR BLD AUTO: 1 %
BILIRUB SERPL-MCNC: 0.3 MG/DL
BUN SERPL-MCNC: 15.8 MG/DL (ref 8–23)
CALCIUM SERPL-MCNC: 8.9 MG/DL (ref 8.8–10.2)
CEA SERPL-MCNC: 1.4 NG/ML
CHLORIDE SERPL-SCNC: 109 MMOL/L (ref 98–107)
CREAT SERPL-MCNC: 0.75 MG/DL (ref 0.51–0.95)
DEPRECATED HCO3 PLAS-SCNC: 27 MMOL/L (ref 22–29)
EOSINOPHIL # BLD AUTO: 0.2 10E3/UL (ref 0–0.7)
EOSINOPHIL NFR BLD AUTO: 4 %
ERYTHROCYTE [DISTWIDTH] IN BLOOD BY AUTOMATED COUNT: 14 % (ref 10–15)
GFR SERPL CREATININE-BSD FRML MDRD: 87 ML/MIN/1.73M2
GLUCOSE SERPL-MCNC: 109 MG/DL (ref 70–99)
HCT VFR BLD AUTO: 37.9 % (ref 35–47)
HGB BLD-MCNC: 11.8 G/DL (ref 11.7–15.7)
IMM GRANULOCYTES # BLD: 0 10E3/UL
IMM GRANULOCYTES NFR BLD: 0 %
LYMPHOCYTES # BLD AUTO: 2.5 10E3/UL (ref 0.8–5.3)
LYMPHOCYTES NFR BLD AUTO: 40 %
MCH RBC QN AUTO: 29.2 PG (ref 26.5–33)
MCHC RBC AUTO-ENTMCNC: 31.1 G/DL (ref 31.5–36.5)
MCV RBC AUTO: 94 FL (ref 78–100)
MONOCYTES # BLD AUTO: 0.4 10E3/UL (ref 0–1.3)
MONOCYTES NFR BLD AUTO: 7 %
NEUTROPHILS # BLD AUTO: 3.1 10E3/UL (ref 1.6–8.3)
NEUTROPHILS NFR BLD AUTO: 48 %
NRBC # BLD AUTO: 0 10E3/UL
NRBC BLD AUTO-RTO: 0 /100
PLATELET # BLD AUTO: 197 10E3/UL (ref 150–450)
POTASSIUM SERPL-SCNC: 4.2 MMOL/L (ref 3.4–5.3)
PROT SERPL-MCNC: 7.1 G/DL (ref 6.4–8.3)
RBC # BLD AUTO: 4.04 10E6/UL (ref 3.8–5.2)
SODIUM SERPL-SCNC: 144 MMOL/L (ref 136–145)
WBC # BLD AUTO: 6.3 10E3/UL (ref 4–11)

## 2023-06-16 PROCEDURE — 250N000011 HC RX IP 250 OP 636: Performed by: INTERNAL MEDICINE

## 2023-06-16 PROCEDURE — 86300 IMMUNOASSAY TUMOR CA 15-3: CPT | Performed by: INTERNAL MEDICINE

## 2023-06-16 PROCEDURE — 36415 COLL VENOUS BLD VENIPUNCTURE: CPT | Performed by: INTERNAL MEDICINE

## 2023-06-16 PROCEDURE — 82378 CARCINOEMBRYONIC ANTIGEN: CPT | Performed by: INTERNAL MEDICINE

## 2023-06-16 PROCEDURE — 80053 COMPREHEN METABOLIC PANEL: CPT | Performed by: INTERNAL MEDICINE

## 2023-06-16 PROCEDURE — 85025 COMPLETE CBC W/AUTO DIFF WBC: CPT | Performed by: INTERNAL MEDICINE

## 2023-06-16 PROCEDURE — 258N000003 HC RX IP 258 OP 636: Performed by: INTERNAL MEDICINE

## 2023-06-16 PROCEDURE — 96413 CHEMO IV INFUSION 1 HR: CPT

## 2023-06-16 RX ORDER — HEPARIN SODIUM (PORCINE) LOCK FLUSH IV SOLN 100 UNIT/ML 100 UNIT/ML
5 SOLUTION INTRAVENOUS EVERY 8 HOURS
Status: DISCONTINUED | OUTPATIENT
Start: 2023-06-16 | End: 2023-06-16 | Stop reason: HOSPADM

## 2023-06-16 RX ADMIN — SODIUM CHLORIDE 250 ML: 9 INJECTION, SOLUTION INTRAVENOUS at 09:49

## 2023-06-16 RX ADMIN — Medication 5 ML: at 10:27

## 2023-06-16 RX ADMIN — TRASTUZUMAB 450 MG: 150 INJECTION, POWDER, LYOPHILIZED, FOR SOLUTION INTRAVENOUS at 09:49

## 2023-06-16 NOTE — NURSING NOTE
Chief Complaint   Patient presents with     Port Draw     Labs drawn via port accessed by RN. VS taken.     Port accessed with 20 g 3/4 inch power needle by RN, labs collected, line flushed with saline and heparin.  Vitals taken. Pt checked in for appointment(s).    Gulshan Zamorano RN

## 2023-06-16 NOTE — PROGRESS NOTES
Infusion Nursing Note:  Hoda Brush presents today for C99 Herceptin.    Patient seen by provider today: No   present during visit today: Yes, Language: Ghanaian.     Note: Patient denies the following: fevers, body aches, chills, headaches, vision changes, dizziness, chest pain, shortness of breath, nausea, vomiting, constipation, abdominal pain, rashes, bruising/bleeding, mouth sores, swelling or pain in the legs. Ok for treatment.       Intravenous Access:  Implanted Port.    Treatment Conditions:  ECHO/MUGA completed 4/12  EF 55-60%.   Latest Reference Range & Units 06/16/23 08:41   Sodium 136 - 145 mmol/L 144   Potassium 3.4 - 5.3 mmol/L 4.2   Chloride 98 - 107 mmol/L 109 (H)   Carbon Dioxide (CO2) 22 - 29 mmol/L 27   Urea Nitrogen 8.0 - 23.0 mg/dL 15.8   Creatinine 0.51 - 0.95 mg/dL 0.75   GFR Estimate >60 mL/min/1.73m2 87   Calcium 8.8 - 10.2 mg/dL 8.9   Anion Gap 7 - 15 mmol/L 8   Albumin 3.5 - 5.2 g/dL 3.9   Protein Total 6.4 - 8.3 g/dL 7.1   Alkaline Phosphatase 35 - 104 U/L 43   ALT 0 - 50 U/L 47   AST 0 - 45 U/L 40   Bilirubin Total <=1.2 mg/dL 0.3   CEA ng/mL 1.4   Glucose 70 - 99 mg/dL 109 (H)   WBC 4.0 - 11.0 10e3/uL 6.3   Hemoglobin 11.7 - 15.7 g/dL 11.8   Hematocrit 35.0 - 47.0 % 37.9   Platelet Count 150 - 450 10e3/uL 197   RBC Count 3.80 - 5.20 10e6/uL 4.04   MCV 78 - 100 fL 94   MCH 26.5 - 33.0 pg 29.2   MCHC 31.5 - 36.5 g/dL 31.1 (L)   RDW 10.0 - 15.0 % 14.0   % Neutrophils % 48   % Lymphocytes % 40   % Monocytes % 7   % Eosinophils % 4   % Basophils % 1   Absolute Basophils 0.0 - 0.2 10e3/uL 0.0   Absolute Eosinophils 0.0 - 0.7 10e3/uL 0.2   Absolute Immature Granulocytes <=0.4 10e3/uL 0.0   Absolute Lymphocytes 0.8 - 5.3 10e3/uL 2.5   Absolute Monocytes 0.0 - 1.3 10e3/uL 0.4   % Immature Granulocytes % 0   Absolute Neutrophils 1.6 - 8.3 10e3/uL 3.1   Absolute NRBCs 10e3/uL 0.0   NRBCs per 100 WBC <1 /100 0     Post Infusion Assessment:  Patient tolerated infusion without  incident.  Blood return noted pre and post infusion.  Site patent and intact, free from redness, edema or discomfort.  No evidence of extravasations.  Access discontinued per protocol.       Discharge Plan:   Patient declined prescription refills.  Discharge instructions reviewed with: Patient.  Patient and/or family verbalized understanding of discharge instructions and all questions answered.  AVS to patient via [employer].  Patient will return 7/6 for next appointment.   Patient discharged in stable condition accompanied by: self.  Departure Mode: Ambulatory.    Susu Seay RN

## 2023-06-18 LAB — CANCER AG27-29 SERPL-ACNC: <9 U/ML

## 2023-07-06 ENCOUNTER — INFUSION THERAPY VISIT (OUTPATIENT)
Dept: ONCOLOGY | Facility: CLINIC | Age: 68
End: 2023-07-06
Attending: NURSE PRACTITIONER
Payer: COMMERCIAL

## 2023-07-06 ENCOUNTER — APPOINTMENT (OUTPATIENT)
Dept: LAB | Facility: CLINIC | Age: 68
End: 2023-07-06
Attending: NURSE PRACTITIONER
Payer: COMMERCIAL

## 2023-07-06 ENCOUNTER — PATIENT OUTREACH (OUTPATIENT)
Dept: GERIATRIC MEDICINE | Facility: CLINIC | Age: 68
End: 2023-07-06

## 2023-07-06 VITALS
WEIGHT: 183 LBS | OXYGEN SATURATION: 96 % | RESPIRATION RATE: 16 BRPM | SYSTOLIC BLOOD PRESSURE: 129 MMHG | HEART RATE: 80 BPM | DIASTOLIC BLOOD PRESSURE: 78 MMHG | TEMPERATURE: 98.6 F | BODY MASS INDEX: 32.68 KG/M2

## 2023-07-06 DIAGNOSIS — C79.51 MALIGNANT NEOPLASM METASTATIC TO BONE (H): ICD-10-CM

## 2023-07-06 DIAGNOSIS — Z13.29 SCREENING FOR THYROID DISORDER: ICD-10-CM

## 2023-07-06 DIAGNOSIS — C50.912 MALIGNANT NEOPLASM OF LEFT FEMALE BREAST, UNSPECIFIED ESTROGEN RECEPTOR STATUS, UNSPECIFIED SITE OF BREAST (H): Primary | ICD-10-CM

## 2023-07-06 DIAGNOSIS — E78.5 HYPERLIPIDEMIA, UNSPECIFIED HYPERLIPIDEMIA TYPE: ICD-10-CM

## 2023-07-06 LAB
ALBUMIN SERPL BCG-MCNC: 4.2 G/DL (ref 3.5–5.2)
ALP SERPL-CCNC: 52 U/L (ref 35–104)
ALT SERPL W P-5'-P-CCNC: 44 U/L (ref 0–50)
ANION GAP SERPL CALCULATED.3IONS-SCNC: 9 MMOL/L (ref 7–15)
AST SERPL W P-5'-P-CCNC: 46 U/L (ref 0–45)
BASOPHILS # BLD AUTO: 0 10E3/UL (ref 0–0.2)
BASOPHILS NFR BLD AUTO: 0 %
BILIRUB SERPL-MCNC: 0.3 MG/DL
BUN SERPL-MCNC: 12.2 MG/DL (ref 8–23)
CALCIUM SERPL-MCNC: 8.9 MG/DL (ref 8.8–10.2)
CEA SERPL-MCNC: 1.1 NG/ML
CHLORIDE SERPL-SCNC: 105 MMOL/L (ref 98–107)
CREAT SERPL-MCNC: 0.79 MG/DL (ref 0.51–0.95)
DEPRECATED HCO3 PLAS-SCNC: 27 MMOL/L (ref 22–29)
EOSINOPHIL # BLD AUTO: 0.2 10E3/UL (ref 0–0.7)
EOSINOPHIL NFR BLD AUTO: 3 %
ERYTHROCYTE [DISTWIDTH] IN BLOOD BY AUTOMATED COUNT: 13.7 % (ref 10–15)
GFR SERPL CREATININE-BSD FRML MDRD: 82 ML/MIN/1.73M2
GLUCOSE SERPL-MCNC: 89 MG/DL (ref 70–99)
HCT VFR BLD AUTO: 40 % (ref 35–47)
HGB BLD-MCNC: 12.5 G/DL (ref 11.7–15.7)
IMM GRANULOCYTES # BLD: 0 10E3/UL
IMM GRANULOCYTES NFR BLD: 0 %
LYMPHOCYTES # BLD AUTO: 2.8 10E3/UL (ref 0.8–5.3)
LYMPHOCYTES NFR BLD AUTO: 39 %
MCH RBC QN AUTO: 29.1 PG (ref 26.5–33)
MCHC RBC AUTO-ENTMCNC: 31.3 G/DL (ref 31.5–36.5)
MCV RBC AUTO: 93 FL (ref 78–100)
MONOCYTES # BLD AUTO: 0.5 10E3/UL (ref 0–1.3)
MONOCYTES NFR BLD AUTO: 6 %
NEUTROPHILS # BLD AUTO: 3.7 10E3/UL (ref 1.6–8.3)
NEUTROPHILS NFR BLD AUTO: 52 %
NRBC # BLD AUTO: 0 10E3/UL
NRBC BLD AUTO-RTO: 0 /100
PLATELET # BLD AUTO: 230 10E3/UL (ref 150–450)
POTASSIUM SERPL-SCNC: 4.1 MMOL/L (ref 3.4–5.3)
PROT SERPL-MCNC: 7.7 G/DL (ref 6.4–8.3)
RBC # BLD AUTO: 4.29 10E6/UL (ref 3.8–5.2)
SODIUM SERPL-SCNC: 141 MMOL/L (ref 136–145)
WBC # BLD AUTO: 7.2 10E3/UL (ref 4–11)

## 2023-07-06 PROCEDURE — 80061 LIPID PANEL: CPT

## 2023-07-06 PROCEDURE — 250N000011 HC RX IP 250 OP 636: Mod: JZ | Performed by: NURSE PRACTITIONER

## 2023-07-06 PROCEDURE — 82374 ASSAY BLOOD CARBON DIOXIDE: CPT | Performed by: INTERNAL MEDICINE

## 2023-07-06 PROCEDURE — 86300 IMMUNOASSAY TUMOR CA 15-3: CPT | Performed by: INTERNAL MEDICINE

## 2023-07-06 PROCEDURE — 99214 OFFICE O/P EST MOD 30 MIN: CPT | Performed by: NURSE PRACTITIONER

## 2023-07-06 PROCEDURE — 258N000003 HC RX IP 258 OP 636: Performed by: INTERNAL MEDICINE

## 2023-07-06 PROCEDURE — 36415 COLL VENOUS BLD VENIPUNCTURE: CPT | Performed by: INTERNAL MEDICINE

## 2023-07-06 PROCEDURE — 250N000011 HC RX IP 250 OP 636: Mod: JZ | Performed by: INTERNAL MEDICINE

## 2023-07-06 PROCEDURE — 82378 CARCINOEMBRYONIC ANTIGEN: CPT | Performed by: INTERNAL MEDICINE

## 2023-07-06 PROCEDURE — 96413 CHEMO IV INFUSION 1 HR: CPT

## 2023-07-06 PROCEDURE — 84443 ASSAY THYROID STIM HORMONE: CPT

## 2023-07-06 PROCEDURE — G0463 HOSPITAL OUTPT CLINIC VISIT: HCPCS | Performed by: NURSE PRACTITIONER

## 2023-07-06 PROCEDURE — 85025 COMPLETE CBC W/AUTO DIFF WBC: CPT | Performed by: INTERNAL MEDICINE

## 2023-07-06 RX ORDER — HEPARIN SODIUM (PORCINE) LOCK FLUSH IV SOLN 100 UNIT/ML 100 UNIT/ML
5 SOLUTION INTRAVENOUS ONCE
Status: COMPLETED | OUTPATIENT
Start: 2023-07-06 | End: 2023-07-06

## 2023-07-06 RX ORDER — HEPARIN SODIUM (PORCINE) LOCK FLUSH IV SOLN 100 UNIT/ML 100 UNIT/ML
5 SOLUTION INTRAVENOUS EVERY 8 HOURS
Status: DISCONTINUED | OUTPATIENT
Start: 2023-07-06 | End: 2023-07-06 | Stop reason: HOSPADM

## 2023-07-06 RX ADMIN — Medication 5 ML: at 12:11

## 2023-07-06 RX ADMIN — SODIUM CHLORIDE 250 ML: 9 INJECTION, SOLUTION INTRAVENOUS at 13:54

## 2023-07-06 RX ADMIN — Medication 5 ML: at 14:25

## 2023-07-06 RX ADMIN — TRASTUZUMAB 450 MG: 150 INJECTION, POWDER, LYOPHILIZED, FOR SOLUTION INTRAVENOUS at 13:54

## 2023-07-06 ASSESSMENT — PAIN SCALES - GENERAL: PAINLEVEL: NO PAIN (0)

## 2023-07-06 NOTE — PROGRESS NOTES
Infusion Nursing Note:  Hoda Brush presents today for Cycle 100 Day 1 Herceptin.    Patient seen by provider today: Yes: Kassidy Robles NP   present during visit today: Not Applicable.    Note: Patient presents to infusion today doing well. No new questions or concerns following her visit with Kassidy Robles NP.     Intravenous Access:  Implanted Port.    Treatment Conditions:  Lab Results   Component Value Date    HGB 12.5 07/06/2023    WBC 7.2 07/06/2023    ANEU 3.1 06/22/2021    ANEUTAUTO 3.7 07/06/2023     07/06/2023      Lab Results   Component Value Date     07/06/2023    POTASSIUM 4.1 07/06/2023    CR 0.79 07/06/2023    TAYLER 8.9 07/06/2023    BILITOTAL 0.3 07/06/2023    ALBUMIN 4.2 07/06/2023    ALT 44 07/06/2023    AST 46 (H) 07/06/2023     Results reviewed, labs MET treatment parameters, ok to proceed with treatment.  ECHO/MUGA completed 4/12/2023 EF 55-60%.    Post Infusion Assessment:  Patient tolerated infusion without incident.  Blood return noted pre and post infusion.  Site patent and intact, free from redness, edema or discomfort.  No evidence of extravasations.  Access discontinued per protocol.     Discharge Plan:   Patient declined prescription refills.  Discharge instructions reviewed with: Patient.  Patient and/or family verbalized understanding of discharge instructions and all questions answered.  AVS to patient via [employer].  Patient will return 7/27 for next appointment.   Patient discharged in stable condition accompanied by: self.  Departure Mode: Ambulatory.      Tierra Vilal RN

## 2023-07-06 NOTE — LETTER
7/6/2023         RE: Hoda Brush  [address]  [facility] [address]        Dear Colleague,    Thank you for referring your patient, Hoda Brush, to the [facility]. Please see a copy of my visit note below.    Hoda is a patient from [facility] and is seen here for continuation of care for her metastatic ER+HER2- breast cancer.  She is a [place] refugee from [facility] and was initially seen in clinic with her daughter.  The only data we have from [facility] is an English translation of her records.       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  On 02/11/2014, she was seen by an oncologist.  The clinical staging of T4b N2 M1 was noted. Her breast cancer was on the left side. There was no right breast cancer, confirmed by the patient and her daughter, correcting a possible error in the translated records.  ER was positive in 100% of the cells, DC at 14% and HER2 was 3+ positive.  It appears that this histopathologic information is on the mastectomy specimen. She underwent an MRI for staging of presumptive bone metastases which was performed 03/02/2014.  There were skeletal metastases in the thoracic vertebrae at 12, L1, L3, L5 and S1 vertebral body, ranging in size from 0.7-3.0 cm in size.  Ischial and right femur were also involved, as well as a large iliac mass measuring about 15 cm in the uterus. 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 Radiation Oncology consultation was performed 03/11/2014, and she was given radiation in 1 dose of 6 Gy to the right iliac lesion.        With the initial diagnosis, she initiated treatment with 6 cycles of CAF neoadjuvant chemotherapy 02/14, 07/07, 03/28, 04/18/14, 05/08 and 05/29/14. She also received monthly zoledronic acid.  She then underwent a modified left mastectomy of Palacios type and left lymphadenoectomy on 06/27/2014.   Pathologic  examination showed number at [facility] was [id number] showed infiltrative grade 2 ductal cancer with 6 level 1 metastatic lymph nodes and 3 level 2 metastatic lymph nodes.  The differentiation was intermediate.  The tumor was ER positive 70% of the cells, [place] positive in 40% of the cells and HER2 was 3+ by immunohistochemistry and the Ki-67 labeling index was 20%. Her staging after surgery was stage IV, pT4b N2 M1.  I don't see a biopsy of the skeletal metastases.  She then had continuation with chemotherapy with 4 cycles of Herceptin and taxane with monthly zoledronic acid.  Tamoxifen was initiated.  She then had two years of Herceptin and a decision was made not to continue further Herceptin.  She continued on zoledronic acid every 3 months. She was clinically stable. [place] the [place] new [place] from [facility].  She has now been changed to letrozole.  Denosumab has now been held for new diagnosis of osteonecrosis of the R maxilla.     History of cholecystectomy 2020.    Seen in the ED on 10/4/2021, for acute abdominal pain.  Elevated LFTs, elevated lipase.  Question of stone versus pancreatitis.  She was discharged and followed up with an outpatient gastroenterology clinic.  They recommended clear liquid diet and ERCP.        TREATMENT HISTORY:  A. Initial diagnosis with metastatic breast cancer in [facility].  Neoadjuvant CAF x 6.   B. Left mastectomy. Left axillary node dissection.  C. Radiation in 1 dose to R iliac region.  C. Herceptin for 2 years taxane for a prescribed course then stopped, monthly zoledronic acid.  She had 2 years of Herceptin with tamoxifen added after chemotherapy.  D. Tamoxifen alone and zoledronic acid every 3 months starting 2014.  Letrozole was started   E. [place]  We restarted Herceptin every 3 weeks and continued tamoxifen. Bone targeted agent changed to denosumab every 9 weeks. Zometa discontinued 2017.  Tamoxifen was changed to letrozole August 2019.     F. Xgeva discontinued 12-17-19 because of dental issues.   G. J+J vaccine March, 2021.  H. Was on Zometa once May 11.  Reaction with eye lid swelling. Discontinued Zometa  H. Restart Xgeva 6-22-21.  Continuing the trastuzumab and letrozole.  Both tolerated well.       INTERVAL HISTORY   Today, she states that she is feeling very well.  She has no new concerns since her last visit.  She denies headaches, dizziness or vision changes.  She denies chest pain, shortness of breath at rest or new cough.  She denies any nausea, vomiting, abdominal pain.  She is moving her bowels regularly.  She denies any new fevers, chills or signs or symptoms of infection.  She has no new dental concerns.  She remains active, and [place] she [place] looking forward to.  She continues calcium and vitamin D.  She is tolerating letrozole without any hot flashes, joint pain or vaginal dryness.    PHYSICAL EXAM:  /78   Pulse 80   Temp 98.6  F (37  C)   Resp 16   Wt 83 kg (183 lb)   SpO2 96%   BMI 32.68 kg/m      Wt Readings from Last 4 Encounters:   07/06/23 83 kg (183 lb)   06/16/23 83.3 kg (183 lb 11.2 oz)   05/23/23 82 kg (180 lb 11.2 oz)   05/02/23 82.3 kg (181 lb 8 oz)   VS: Above vitals reviewed, stable without concerns.  General: Resting comfortably in no acute distress.  Head: Normocephalic, atraumatic.   Heart: No lower extremity edema.  Lung: Normal respiratory effort. Speaking in full, fluid sentences.  Abdomen: Abdomen non-distended.  Neuro: AAO x3. Gait is steady. Moving all extremities   Skin: Warm, dry, intact. No rashes, no suspicious lesions. No petechia or bruising noted on exposed skin.    Labs  Most Recent 3 CBC's:Recent Labs   Lab Test 07/06/23  1216 06/16/23  0841 05/23/23  0920   WBC 7.2 6.3 6.4   HGB 12.5 11.8 12.6   MCV 93 94 94    197 241   ANEUTAUTO 3.7 3.1 3.2     Most Recent 3 BMP's:  Recent Labs   Lab Test 07/06/23  1216 06/16/23  0858  05/23/23  0920    144 142   POTASSIUM 4.1 4.2 4.2   CHLORIDE 105 109* 106   CO2 27 27 28   BUN 12.2 15.8 12.2   CR 0.79 0.75 0.80   ANIONGAP 9 8 8   TAYLER 8.9 8.9 9.2   GLC 89 109* 97   PROTTOTAL 7.7 7.1 7.5   ALBUMIN 4.2 3.9 4.0    Most Recent 3 LFT's:  Recent Labs   Lab Test 07/06/23  1216 06/16/23  0841 05/23/23  0920   AST 46* 40 33   ALT 44 47 35   ALKPHOS 52 43 50   BILITOTAL 0.3 0.3 0.3    Most Recent 2 TSH and T4:  Recent Labs   Lab Test 12/09/21  1438 01/08/19  1054   TSH 1.01 1.12     I reviewed the above labs today.     I reviewed the above labs today. CEA and CA 27-29 pending.     IMAGING  No new imaging to review today.  She is scheduled for follow-up echo next week, and follow-up CT scan in August.    ASSESSMENT AND PLAN:     Hoda Brush is a 68 year old woman with a history of ER-positive, MT-positive, HER2 positive breast cancer:  She is from [facility] [place], formally from [facility], and came to live in the [place] with [place].  The tumor is ER positive, MT positive and HER2 positive. She had metastatic disease at the time of presentation with bone-only metastases by report.  She underwent neoadjuvant CAF, had a left mastectomy, left axillary lymph node dissection, radiation to the right hip, which included the right iliac region where she had metastatic disease.  She was initially on tamoxifen but then was switched to letrozole.  She continues on this and every 3-week trastuzumab. She has had no evidence of disease progression for the last 3 years.  We have continued Herceptin every 3 weeks as well as daily letrozole.   - CT CAP 4/12 stable.   - Tumor markers pending.  - Proceed with herceptin on today. Continue letrozole.  - Echo every 3 months, due next week, and this is scheduled.  - Provider visit every 6 weeks as scheduled.     Bone health  - Xgeva was on hold due to possible ONJ. See Dr. Aguiar's note from 12/16/2021--this was resumed on 1/6/22.   - To be given every 3 months--this  was previoulsy confirmed with Dr. Aguiar.   - Last dental appt 5/19/2023 per recent notes.  - Currently no dental issues, Xgeva done 5/23/2023 and due in August 2023.    Vaccines  - Up to date on Flu and Covid     Patient will call in the interim with any questions or concerns. They voice understanding and agree with the above plan.     Kasisdy Robles, APRN, CNP  [facility] [address] [address]  [phone]

## 2023-07-06 NOTE — Clinical Note
Good morning Dr Dee,  I am Hoda's health . I am just writing to let you know that I was not able to reach Hoda this month to set up her annual assessment. She is not open to any waiver and does not receive any services in her home at this time. I will try to reach out to her in 6 months to see how things are going for her and offer another assessment. Please let me know if you have any questions or concerns.  Thank you,  Lakesha Ventura RN [facility] [phone]

## 2023-07-06 NOTE — PATIENT INSTRUCTIONS
Contact Numbers  [facility]: [phone] (for symptom and scheduling needs)    Please call the [facility] Triage line if you experience a temperature greater than or equal to 100.4, shaking chills, have uncontrolled nausea, vomiting and/or diarrhea, dizziness, shortness of breath, chest pain, bleeding, unexplained bruising, or if you have any other new/concerning symptoms, questions or concerns.     If you are having any concerning symptoms or wish to speak to a provider before your next infusion visit, please call your care coordinator or triage to notify them so we can adequately serve you.     If you need a refill on a narcotic prescription or other medication, please call triage before your infusion appointment.           July 2023 Sunday Monday Tuesday Wednesday Thursday Friday Saturday                                 1       2     3     4     5     6    LAB CENTRAL  12:00 PM   (15 min.)   [facility] LAB DRAW   [facility]    RETURN CCSL  12:15 PM   (45 min.)   Kassidy Robles APRN CNP   [facility]    ONC INFUSION 1 HR (60 MIN)   1:30 PM   (60 min.)    ONC INFUSION NURSE   [facility] 7     8       9     10     11    ANNUAL WELLNESS   7:40 AM   (30 min.)   Laury Dee MD   [facility]    ECHO COMPLETE   8:45 AM   (60 min.)   [id number]   [facility] 12     13     14     15       16     17     18     19     20     21     22       23     24     25     26     27    LAB CENTRAL   9:15 AM   (15 min.)   [facility] LAB DRAW   [facility]    RETURN CCSL   9:25 AM   (30 min.)   Lisandro Aguiar MD   [facility]    ONC INFUSION 1 HR (60 MIN)  11:00 AM   (60 min.)    ONC INFUSION NURSE   [facility] 28     29       30     31                                             August 2023 Sunday Monday Tuesday Wednesday Thursday Friday Saturday             1     2     3     4     5       6     7     8     9     10     11     12       13     14     15     16    CT CHEST/ABDOMEN/PELVIS W  10:10 AM   (20 min.)   EICCT1   [facility] 17    LAB CENTRAL   7:45 AM   (15 min.)   [facility] LAB DRAW   [facility]    RETURN CCSL   7:55 AM   (30 min.)   Lisandro Aguiar MD   [facility]    ONC INFUSION 1 HR (60 MIN)   9:00 AM   (60 min.)    ONC INFUSION NURSE   [facility] 18     19       20     21     22     23     24     25     26       27     28     29     30     31                                 Lab Results:  Recent Results (from the past 12 hour(s))   Comprehensive metabolic panel    Collection Time: 07/06/23 12:16 PM   Result Value Ref Range    Sodium 141 136 - 145 mmol/L    Potassium 4.1 3.4 - 5.3 mmol/L    Chloride 105 98 - 107 mmol/L    Carbon Dioxide (CO2) 27 22 - 29 mmol/L    Anion Gap 9 7 - 15 mmol/L    Urea Nitrogen 12.2 8.0 - 23.0 mg/dL    Creatinine 0.79 0.51 - 0.95 mg/dL    Calcium 8.9 8.8 - 10.2 mg/dL    Glucose 89 70 - 99 mg/dL    Alkaline Phosphatase 52 35 - 104 U/L    AST 46 (H) 0 - 45 U/L    ALT 44 0 - 50 U/L    Protein Total 7.7 6.4 - 8.3 g/dL    Albumin 4.2 3.5 - 5.2 g/dL    Bilirubin Total 0.3 <=1.2 mg/dL    GFR Estimate 82 >60 mL/min/1.73m2   CBC with platelets and differential    Collection Time: 07/06/23 12:16 PM   Result Value Ref Range    WBC Count 7.2 4.0 - 11.0 10e3/uL    RBC Count 4.29 3.80 - 5.20 10e6/uL    Hemoglobin 12.5 11.7 - 15.7 g/dL    Hematocrit 40.0 35.0 - 47.0 %    MCV 93 78 - 100 fL    MCH 29.1 26.5 - 33.0 pg    MCHC 31.3 (L) 31.5 - 36.5 g/dL    RDW 13.7 10.0 - 15.0 %    Platelet Count 230 150 - 450 10e3/uL    % Neutrophils 52 %    % Lymphocytes 39 %    % Monocytes 6 %    % Eosinophils 3 %    % Basophils 0 %    % Immature  Granulocytes 0 %    NRBCs per 100 WBC 0 <1 /100    Absolute Neutrophils 3.7 1.6 - 8.3 10e3/uL    Absolute Lymphocytes 2.8 0.8 - 5.3 10e3/uL    Absolute Monocytes 0.5 0.0 - 1.3 10e3/uL    Absolute Eosinophils 0.2 0.0 - 0.7 10e3/uL    Absolute Basophils 0.0 0.0 - 0.2 10e3/uL    Absolute Immature Granulocytes 0.0 <=0.4 10e3/uL    Absolute NRBCs 0.0 10e3/uL

## 2023-07-06 NOTE — PROGRESS NOTES
Hoda is a patient from [facility] and is seen here for continuation of care for her metastatic ER+HER2- breast cancer.  She is a [place] refugee from [facility] and was initially seen in clinic with her daughter.  The only data we have from [facility] is an English translation of her records.       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  On 02/11/2014, she was seen by an oncologist.  The clinical staging of T4b N2 M1 was noted. Her breast cancer was on the left side. There was no right breast cancer, confirmed by the patient and her daughter, correcting a possible error in the translated records.  ER was positive in 100% of the cells, IN at 14% and HER2 was 3+ positive.  It appears that this histopathologic information is on the mastectomy specimen. She underwent an MRI for staging of presumptive bone metastases which was performed 03/02/2014.  There were skeletal metastases in the thoracic vertebrae at 12, L1, L3, L5 and S1 vertebral body, ranging in size from 0.7-3.0 cm in size.  Ischial and right femur were also involved, as well as a large iliac mass measuring about 15 cm in the uterus. 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 Radiation Oncology consultation was performed 03/11/2014, and she was given radiation in 1 dose of 6 Gy to the right iliac lesion.        With the initial diagnosis, she initiated treatment with 6 cycles of CAF neoadjuvant chemotherapy 02/14, 07/07, 03/28, 04/18/14, 05/08 and 05/29/14. She also received monthly zoledronic acid.  She then underwent a modified left mastectomy of [place] type and left lymphadenoectomy on 06/27/2014.   Pathologic examination showed number at [facility] was [id number] showed infiltrative grade 2 ductal cancer with 6 level 1 metastatic lymph nodes and 3 level 2 metastatic lymph nodes.  The differentiation was intermediate.  The tumor was ER positive 70% of the cells, IN positive  in 40% of the cells and HER2 was 3+ by immunohistochemistry and the Ki-67 labeling index was 20%. Her staging after surgery was stage IV, pT4b N2 M1.  I don't see a biopsy of the skeletal metastases.  She then had continuation with chemotherapy with 4 cycles of Herceptin and taxane with monthly zoledronic acid.  Tamoxifen was initiated.  She then had two years of Herceptin and a decision was made not to continue further Herceptin.  She continued on zoledronic acid every 3 months. She was clinically stable. [place] then moved to the [place] new refugee from [facility].  She has now been changed to letrozole.  Denosumab has now been held for new diagnosis of osteonecrosis of the R maxilla.     History of cholecystectomy 2020.    Seen in the ED on 10/4/2021, for acute abdominal pain.  Elevated LFTs, elevated lipase.  Question of stone versus pancreatitis.  She was discharged and followed up with an outpatient gastroenterology clinic.  They recommended clear liquid diet and ERCP.        TREATMENT HISTORY:  A. Initial diagnosis with metastatic breast cancer in [facility].  Neoadjuvant CAF x 6.   B. Left mastectomy. Left axillary node dissection.  C. Radiation in 1 dose to R iliac region.  C. Herceptin for 2 years taxane for a prescribed course then stopped, monthly zoledronic acid.  She had 2 years of Herceptin with tamoxifen added after chemotherapy.  D. Tamoxifen alone and zoledronic acid every 3 months starting 2014.  Letrozole was started   [place]  We restarted Herceptin every 3 weeks and continued tamoxifen. Bone targeted agent changed to denosumab every 9 weeks. Zometa discontinued 2017.  Tamoxifen was changed to letrozole August 2019.    F. Xgeva discontinued 12-17-19 because of dental issues.   G. J+J vaccine March, 2021.  H. Was on Zometa once May 11.  Reaction with eye lid swelling. Discontinued Zometa  H. Restart Xgeva 6-22-21.  Continuing the trastuzumab and letrozole.  Both tolerated well.        INTERVAL HISTORY   Today, she states that she is feeling very well.  She has no new concerns since her last visit.  She denies headaches, dizziness or vision changes.  She denies chest pain, shortness of breath at rest or new cough.  She denies any nausea, vomiting, abdominal pain.  She is moving her bowels regularly.  She denies any new fevers, chills or signs or symptoms of infection.  She has no new dental concerns.  She remains active, and [place] she is looking forward to.  She continues calcium and vitamin D.  She is tolerating letrozole without any hot flashes, joint pain or vaginal dryness.    PHYSICAL EXAM:  /78   Pulse 80   Temp 98.6  F (37  C)   Resp 16   Wt 83 kg (183 lb)   SpO2 96%   BMI 32.68 kg/m      Wt Readings from Last 4 Encounters:   07/06/23 83 kg (183 lb)   06/16/23 83.3 kg (183 lb 11.2 oz)   05/23/23 82 kg (180 lb 11.2 oz)   05/02/23 82.3 kg (181 lb 8 oz)   VS: Above vitals reviewed, stable without concerns.  General: Resting comfortably in no acute distress.  Head: Normocephalic, atraumatic.   Heart: No lower extremity edema.  Lung: Normal respiratory effort. Speaking in full, fluid sentences.  Abdomen: Abdomen non-distended.  Neuro: AAO x3. Gait is steady. Moving all extremities   Skin: Warm, dry, intact. No rashes, no suspicious lesions. No petechia or bruising noted on exposed skin.    Labs  Most Recent 3 CBC's:Recent Labs   Lab Test 07/06/23  1216 06/16/23  0841 05/23/23  0920   WBC 7.2 6.3 6.4   HGB 12.5 11.8 12.6   MCV 93 94 94    197 241   ANEUTAUTO 3.7 3.1 3.2     Most Recent 3 BMP's:  Recent Labs   Lab Test 07/06/23  1216 06/16/23  0841 05/23/23  0920    144 142   POTASSIUM 4.1 4.2 4.2   CHLORIDE 105 109* 106   CO2 27 27 28   BUN 12.2 15.8 12.2   CR 0.79 0.75 0.80   ANIONGAP 9 8 8   TAYLER 8.9 8.9 9.2   GLC 89 109* 97   PROTTOTAL 7.7 7.1 7.5   ALBUMIN 4.2 3.9 4.0    Most Recent 3 LFT's:  Recent  Labs   Lab Test 07/06/23  1216 06/16/23  0841 05/23/23  0920   AST 46* 40 33   ALT 44 47 35   ALKPHOS 52 43 50   BILITOTAL 0.3 0.3 0.3    Most Recent 2 TSH and T4:  Recent Labs   Lab Test 12/09/21  1438 01/08/19  1054   TSH 1.01 1.12     I reviewed the above labs today.     I reviewed the above labs today. CEA and CA 27-29 pending.     IMAGING  No new imaging to review today.  She is scheduled for follow-up echo next week, and follow-up CT scan in August.    ASSESSMENT AND PLAN:     Hoda Brush is a 68 year old woman with a history of ER-positive, LA-positive, HER2 positive breast cancer:  She is from [facility], formally from [facility], and came to live in the [place] with her daughter.  The tumor is ER positive, LA positive and HER2 positive. She had metastatic disease at the time of presentation with bone-only metastases by report.  She underwent neoadjuvant CAF, had a left mastectomy, left axillary lymph node dissection, radiation to the right hip, which included the right iliac region where she had metastatic disease.  She was initially on tamoxifen but then was switched to letrozole.  She continues on this and every 3-week trastuzumab. She has had no evidence of disease progression for the last 3 years.  We have continued Herceptin every 3 weeks as well as daily letrozole.   - CT CAP 4/12 stable.   - Tumor markers pending.  - Proceed with herceptin on today. Continue letrozole.  - Echo every 3 months, due next week, and this is scheduled.  - Provider visit every 6 weeks as scheduled.     Bone health  - Xgeva was on hold due to possible ONJ. See Dr. Aguiar's note from 12/16/2021--this was resumed on 1/6/22.   - To be given every 3 months--this was previoulsy confirmed with Dr. Aguiar.   - Last dental appt 5/19/2023 per recent notes.  - Currently no dental issues, Xgeva done 5/23/2023 and due in August 2023.    Vaccines  - Up to date on Flu and Covid     Patient will call in the interim with any questions  or concerns. They voice understanding and agree with the above plan.     KATHI Valencia, CNP  [facility] [place] [id number] [phone]

## 2023-07-06 NOTE — NURSING NOTE
"Oncology Rooming Note    July 6, 2023 12:29 PM   Hoda Brsuh is a 68 year old female who presents for:    Chief Complaint   Patient presents with     Port Draw     Labs drawn via port by rn in lab. VS taken.     Oncology Clinic Visit     [facility] RETURN - BREAST CANCER     Initial Vitals: /78   Pulse 80   Temp 98.6  F (37  C)   Resp 16   Wt 83 kg (183 lb)   SpO2 96%   BMI 32.68 kg/m   Estimated body mass index is 32.68 kg/m  as calculated from the following:    Height as of 9/12/22: 1.594 m (5' 2.75\").    Weight as of this encounter: 83 kg (183 lb). Body surface area is 1.92 meters squared.  No Pain (0) Comment: Data Unavailable   No LMP recorded. Patient is postmenopausal.  Allergies reviewed: Yes  Medications reviewed: Yes    Medications: Medication refills not needed today.  Pharmacy [facility] entered into Just Dial: [id number] [place], [place] [address]            "

## 2023-07-07 LAB — CANCER AG27-29 SERPL-ACNC: <9 U/ML

## 2023-07-07 ASSESSMENT — ENCOUNTER SYMPTOMS
FREQUENCY: 0
WEAKNESS: 0
HEARTBURN: 0
NAUSEA: 0
HEMATURIA: 0
HEMATOCHEZIA: 0
SHORTNESS OF BREATH: 0
DIARRHEA: 0
CHILLS: 0
ABDOMINAL PAIN: 0
EYE PAIN: 0
PALPITATIONS: 0
CONSTIPATION: 0
PARESTHESIAS: 0
ARTHRALGIAS: 0
HEADACHES: 0
JOINT SWELLING: 0
SORE THROAT: 0
NERVOUS/ANXIOUS: 0
MYALGIAS: 0
BREAST MASS: 0
DIZZINESS: 0
FEVER: 0
COUGH: 0
DYSURIA: 0

## 2023-07-07 ASSESSMENT — ACTIVITIES OF DAILY LIVING (ADL): CURRENT_FUNCTION: NO ASSISTANCE NEEDED

## 2023-07-11 ENCOUNTER — PATIENT OUTREACH (OUTPATIENT)
Dept: GERIATRIC MEDICINE | Facility: CLINIC | Age: 68
End: 2023-07-11

## 2023-07-11 ENCOUNTER — OFFICE VISIT (OUTPATIENT)
Dept: FAMILY MEDICINE | Facility: CLINIC | Age: 68
End: 2023-07-11
Payer: COMMERCIAL

## 2023-07-11 ENCOUNTER — HOSPITAL ENCOUNTER (OUTPATIENT)
Dept: CARDIOLOGY | Facility: CLINIC | Age: 68
Discharge: HOME | End: 2023-07-11
Attending: INTERNAL MEDICINE | Admitting: INTERNAL MEDICINE
Payer: COMMERCIAL

## 2023-07-11 VITALS
OXYGEN SATURATION: 95 % | HEART RATE: 81 BPM | SYSTOLIC BLOOD PRESSURE: 121 MMHG | WEIGHT: 181 LBS | HEIGHT: 62 IN | BODY MASS INDEX: 33.31 KG/M2 | DIASTOLIC BLOOD PRESSURE: 80 MMHG | TEMPERATURE: 98 F | RESPIRATION RATE: 16 BRPM

## 2023-07-11 DIAGNOSIS — C50.919 PRIMARY MALIGNANT NEOPLASM OF BREAST WITH METASTASIS (H): Primary | ICD-10-CM

## 2023-07-11 DIAGNOSIS — Z00.00 ENCOUNTER FOR MEDICARE ANNUAL WELLNESS EXAM: Primary | ICD-10-CM

## 2023-07-11 DIAGNOSIS — Z51.11 ENCOUNTER FOR ANTINEOPLASTIC CHEMOTHERAPY: ICD-10-CM

## 2023-07-11 DIAGNOSIS — Z90.12 S/P MASTECTOMY, LEFT: ICD-10-CM

## 2023-07-11 DIAGNOSIS — E78.5 HYPERLIPIDEMIA, UNSPECIFIED HYPERLIPIDEMIA TYPE: ICD-10-CM

## 2023-07-11 DIAGNOSIS — Z78.0 ASYMPTOMATIC POSTMENOPAUSAL STATUS: ICD-10-CM

## 2023-07-11 DIAGNOSIS — C50.919 PRIMARY MALIGNANT NEOPLASM OF BREAST WITH METASTASIS (H): ICD-10-CM

## 2023-07-11 DIAGNOSIS — K76.0 FATTY LIVER: ICD-10-CM

## 2023-07-11 DIAGNOSIS — Z13.29 SCREENING FOR THYROID DISORDER: ICD-10-CM

## 2023-07-11 LAB
CHOLEST SERPL-MCNC: 244 MG/DL
HDLC SERPL-MCNC: 45 MG/DL
LDLC SERPL CALC-MCNC: 166 MG/DL
LVEF ECHO: NORMAL
NONHDLC SERPL-MCNC: 199 MG/DL
TRIGL SERPL-MCNC: 165 MG/DL
TSH SERPL DL<=0.005 MIU/L-ACNC: 0.77 UIU/ML (ref 0.3–4.2)

## 2023-07-11 PROCEDURE — 99397 PER PM REEVAL EST PAT 65+ YR: CPT | Performed by: INTERNAL MEDICINE

## 2023-07-11 PROCEDURE — 99213 OFFICE O/P EST LOW 20 MIN: CPT | Mod: 25 | Performed by: INTERNAL MEDICINE

## 2023-07-11 PROCEDURE — 255N000002 HC RX 255 OP 636: Performed by: INTERNAL MEDICINE

## 2023-07-11 PROCEDURE — 250N000011 HC RX IP 250 OP 636: Performed by: INTERNAL MEDICINE

## 2023-07-11 PROCEDURE — 93306 TTE W/DOPPLER COMPLETE: CPT | Mod: 26 | Performed by: INTERNAL MEDICINE

## 2023-07-11 PROCEDURE — 999N000208 ECHOCARDIOGRAM COMPLETE

## 2023-07-11 RX ORDER — HEPARIN SODIUM (PORCINE) LOCK FLUSH IV SOLN 100 UNIT/ML 100 UNIT/ML
5-10 SOLUTION INTRAVENOUS
Status: DISCONTINUED | OUTPATIENT
Start: 2023-07-11 | End: 2023-07-12 | Stop reason: HOSPADM

## 2023-07-11 RX ADMIN — SODIUM CHLORIDE, PRESERVATIVE FREE 5 ML: 5 INJECTION INTRAVENOUS at 10:10

## 2023-07-11 RX ADMIN — HUMAN ALBUMIN MICROSPHERES AND PERFLUTREN 9 ML: 10; .22 INJECTION, SOLUTION INTRAVENOUS at 10:03

## 2023-07-11 ASSESSMENT — ENCOUNTER SYMPTOMS
FREQUENCY: 0
SHORTNESS OF BREATH: 0
NAUSEA: 0
COUGH: 0
DYSURIA: 0
PARESTHESIAS: 0
DIARRHEA: 0
BREAST MASS: 0
EYE PAIN: 0
PALPITATIONS: 0
WEAKNESS: 0
HEADACHES: 0
CONSTIPATION: 0
CHILLS: 0
SORE THROAT: 0
JOINT SWELLING: 0
MYALGIAS: 0
DIZZINESS: 0
HEMATURIA: 0
NERVOUS/ANXIOUS: 0
HEARTBURN: 0
FEVER: 0
HEMATOCHEZIA: 0
ARTHRALGIAS: 0
ABDOMINAL PAIN: 0

## 2023-07-11 ASSESSMENT — PAIN SCALES - GENERAL: PAINLEVEL: NO PAIN (0)

## 2023-07-11 ASSESSMENT — ACTIVITIES OF DAILY LIVING (ADL): CURRENT_FUNCTION: NO ASSISTANCE NEEDED

## 2023-07-11 NOTE — LETTER
July 11, 2023    NATASHA LOUIS  [address]   [place] [address]    Dear Natasha:     I m your care coordinator. I ve been unable to reach you by phone. I am writing to ask you or your authorized representative to call me at [phone]. If you reach my voicemail, leave a message with your daytime phone number. Include a date and time that I can call you. If you are hearing impaired, call the [place] Relay at 456 or [phone] (mybdry-is-jpbeza relay service).    The reason I am trying to reach you is:     [x] To schedule an assessment  [] For your six (6)-month check-in  [] Other:      Please call me as soon as you receive this letter. I look forward to speaking with you.    Sincerely,    Lakesha Ventura RN, BSN, PHN  [phone]  [email or web address]            [id number] accepted  [id number]                                                                        B  (08/2022)

## 2023-07-11 NOTE — PROGRESS NOTES
"SUBJECTIVE:   Hoda is a 68 year old who presents for Preventive Visit.       No data to display              Are you in the first 12 months of your Medicare coverage?  No    Healthy Habits:     In general, how would you rate your overall health?  Good    Frequency of exercise:  6-7 days/week    Duration of exercise:  30-45 minutes    Do you usually eat at least 4 servings of fruit and vegetables a day, include whole grains    & fiber and avoid regularly eating high fat or \"junk\" foods?  No    Taking medications regularly:  Yes    Medication side effects:  None    Ability to successfully perform activities of daily living:  No assistance needed    Home Safety:  No safety concerns identified    Hearing Impairment:  No hearing concerns    In the past 6 months, have you been bothered by leaking of urine?  No    In general, how would you rate your overall mental or emotional health?  Good    Additional concerns today:  No        Have you ever done Advance Care Planning? (For example, a Health Directive, POLST, or a discussion with a medical provider or your loved ones about your wishes): Yes, advance care planning is on file.       Fall risk  Fallen 2 or more times in the past year?: No  Any fall with injury in the past year?: No    Cognitive Screening   1) Repeat 3 items (Leader, Season, Table)    2) Clock draw: NORMAL  3) 3 item recall: Recalls 3 objects  Results: 3 items recalled: COGNITIVE IMPAIRMENT LESS LIKELY    Mini-CogTM Copyright RENEA Sanchez. Licensed by the author for use in [facility]; reprinted with permission (amos@.[facility]). All rights reserved.      Do you have sleep apnea, excessive snoring or daytime drowsiness?: no    Reviewed and updated as needed this visit by clinical staff   Tobacco  Allergies  Meds              Reviewed and updated as needed this visit by Provider                 Social History     Tobacco Use     Smoking status: Never     Smokeless tobacco: Never   Substance Use " Topics     Alcohol use: No           7/7/2023    11:15 AM   Alcohol Use   Prescreen: >3 drinks/day or >7 drinks/week? No     Do you have a current opioid prescription? No  Do you use any other controlled substances or medications that are not prescribed by a provider? None    Current providers sharing in care for this patient include:   Patient Care Team:  Laury Dee MD as PCP - General (Internal Medicine)  Lisandro Aguiar MD as MD (Oncology)  Lisandro Aguiar MD as Assigned Cancer Care Provider  Laury Dee MD as Assigned PCP  Laury Dee MD as Referring Physician (Internal Medicine)  Gwen Walters APRN CNP as Nurse Practitioner (Dermatology)  Alejandra Brooks PA-C as Assigned Surgical Provider  Lakesha Ventura RN as Lead Care Coordinator (Primary Care - CC)    The following health maintenance items are reviewed in Epic and correct as of today:  Health Maintenance   Topic Date Due     DEXA  Never done     INFLUENZA VACCINE (1) 09/01/2023     MEDICARE ANNUAL WELLNESS VISIT  07/11/2024     ANNUAL REVIEW OF HM ORDERS  07/11/2024     FALL RISK ASSESSMENT  07/11/2024     LIPID  09/12/2027     COLORECTAL CANCER SCREENING  02/01/2028     ADVANCE CARE PLANNING  07/11/2028     DTAP/TDAP/TD IMMUNIZATION (2 - Td or Tdap) 07/21/2030     HEPATITIS C SCREENING  Completed     PHQ-2 (once per calendar year)  Completed     Pneumococcal Vaccine: 65+ Years  Completed     ZOSTER IMMUNIZATION  Completed     COVID-19 Vaccine  Completed     IPV IMMUNIZATION  Aged Out     MENINGITIS IMMUNIZATION  Aged Out     PAP  Discontinued     Lab work is in process  Labs reviewed in [facility]  Mammogram Screening: Mammogram Screening - Alternate mammogram schedule due to breast cancer history for the past 9 years    FHS-7:        No data to display              Mammogram Screening - Alternate mammogram schedule due to breast cancer history  Pertinent mammograms are reviewed under the imaging tab.    Review  "of Systems   Constitutional: Negative for chills and fever.   HENT: Negative for congestion, ear pain, hearing loss and sore throat.    Eyes: Negative for pain and visual disturbance.   Respiratory: Negative for cough and shortness of breath.    Cardiovascular: Negative for chest pain, palpitations and peripheral edema.   Gastrointestinal: Negative for abdominal pain, constipation, diarrhea, heartburn, hematochezia and nausea.   Breasts:  Negative for tenderness, breast mass and discharge.   Genitourinary: Negative for dysuria, frequency, genital sores, hematuria, pelvic pain, urgency, vaginal bleeding and vaginal discharge.   Musculoskeletal: Negative for arthralgias, joint swelling and myalgias.   Skin: Negative for rash.   Neurological: Negative for dizziness, weakness, headaches and paresthesias.   Psychiatric/Behavioral: Negative for mood changes. The patient is not nervous/anxious.        OBJECTIVE:   /80 (BP Location: Right arm, Patient Position: Sitting, Cuff Size: Adult Large)   Pulse 81   Temp 98  F (36.7  C) (Oral)   Resp 16   Ht 1.58 m (5' 2.21\")   Wt 82.1 kg (181 lb)   SpO2 95%   BMI 32.89 kg/m   Estimated body mass index is 32.89 kg/m  as calculated from the following:    Height as of this encounter: 1.58 m (5' 2.21\").    Weight as of this encounter: 82.1 kg (181 lb).  Physical Exam  GENERAL: healthy, alert and no distress  EYES: Eyes grossly normal to inspection, PERRL and conjunctivae and sclerae normal  HENT: ear canals and TM's normal, nose and mouth without ulcers or lesions  NECK: no adenopathy, no asymmetry, masses, or scars and thyroid normal to palpation  RESP: lungs clear to auscultation - no rales, rhonchi or wheezes  BREAST: Patient has left mastectomy  scar, right breast exam normal,  normal without masses, tenderness or nipple discharge and no palpable axillary masses or adenopathy  CV: regular rate and rhythm, normal S1 S2, no S3 or S4, no murmur, click or rub, no peripheral " edema and peripheral pulses strong  ABDOMEN: soft, nontender, no hepatosplenomegaly, no masses and bowel sounds normal  MS: no gross musculoskeletal defects noted, no edema  SKIN: She has hyperpigmented areas and freckles, no suspicious lesions or rashes  NEURO: Normal strength and tone, mentation intact and speech normal  PSYCH: mentation appears normal, affect normal/bright    Diagnostic Test Results:  Labs reviewed in Epic    ASSESSMENT / PLAN:   Hoda was seen today for physical.    Diagnoses and all orders for this visit:    Encounter for Medicare annual wellness exam  Preventive health counseling was also done.  Pt is on Trazodone 60 mg   Pt is not on Melatonin   She takes adequate calcium and vitamin D  Chest CT in 4/2023 - stable, next is 8/16/2023  DEXA is due, I ordered it for the pt  Colonoscopy in 1/24/2018, next in 2028  ECO scheduled for today 7/11/2023      Screening for thyroid disorder  Will be checked with blood work in the future  -     TSH with free T4 reflex; Future    Hyperlipidemia, unspecified hyperlipidemia type  Will be checked with blood work in the future  -     Lipid panel reflex to direct LDL Non-fasting; Future    S/P mastectomy, left  Past history due to breast cancer  She is followed by oncology    Metastatic breast cancer  Followed by oncology  Notes were reviewed    Fatty liver  Discussed the importance of healthy diet and exercise and losing weight    Asymptomatic postmenopausal status  -     DX Hip/Pelvis/Spine; Future    Other orders  -     REVIEW OF HEALTH MAINTENANCE PROTOCOL ORDERS         Patient has been advised of split billing requirements and indicates understanding: Yes      COUNSELING:  Reviewed preventive health counseling, as reflected in patient instructions  Special attention given to:       Regular exercise       Healthy diet/nutrition      BMI:   Estimated body mass index is 32.89 kg/m  as calculated from the following:    Height as of this encounter: 1.58 m (5'  "2.21\").    Weight as of this encounter: 82.1 kg (181 lb).   Weight management plan: Discussed healthy diet and exercise guidelines      She reports that she has never smoked. She has never used smokeless tobacco.      Appropriate preventive services were discussed with this patient, including applicable screening as appropriate for cardiovascular disease, diabetes, osteopenia/osteoporosis, and glaucoma.  As appropriate for age/gender, discussed screening for colorectal cancer, prostate cancer, breast cancer, and cervical cancer. Checklist reviewing preventive services available has been given to the patient.    Reviewed patients plan of care and provided an AVS. The Basic Care Plan (routine screening as documented in Health Maintenance) for Hoda meets the Care Plan requirement. This Care Plan has been established and reviewed with the Patient.          Laury Dee MD  [facility]    Identified Health Risks:    I have reviewed Opioid Use Disorder and Substance Use Disorder risk factors and made any needed referrals.      This document serves as a record of sevices personally performed by Dr. Dee. It was created on her behalf by Alondra Lucio, a trained medical scribe. The creation of this record is based on the scribe's personal observations and the provider's statements to them. This document has been checked and approved by the attending provider.     7/11/2023, 8:03 AM    "

## 2023-07-11 NOTE — PROGRESS NOTES
"[facility] Care Coordination Contact    Per CC, mailed client an \"Unable to Contact\" letter.  Mailed member [employer] Leave behind document.    Chey Hanley  Case Management Specialist  [facility]  [phone]    "

## 2023-07-11 NOTE — PATIENT INSTRUCTIONS
You are due for bone density  Please call the following number to make appointment :  [phone]  It is located in [address]    Follow up in one year for physical   Seek sooner medical attention if there is any worsening of symptoms or problems.       Patient Education   Personalized Prevention Plan  You are due for the preventive services outlined below.  Your care team is available to assist you in scheduling these services.  If you have already completed any of these items, please share that information with your care team to update in your medical record.  Health Maintenance Due   Topic Date Due    Osteoporosis Screening  Never done    ANNUAL REVIEW OF HM ORDERS  12/09/2022       Understanding USDA MyPlate  The USDA has guidelines to help you make healthy food choices. These are called MyPlate. MyPlate shows the food groups that make up healthy meals using the image of a place setting. Before you eat, think about the healthiest choices for what to put on your plate or in your cup or bowl. To learn more about building a healthy plate, visit www.[employer].gov.     The food groups  Fruits. Any fruit or 100% fruit juice counts as part of the Fruit Group. Fruits may be fresh, canned, frozen, or dried, and may be whole, cut-up, or pureed. Make 1/2 of your plate fruits and vegetables.  Vegetables. Any vegetable or 100% vegetable juice counts as a member of the Vegetable Group. Vegetables may be fresh, frozen, canned, or dried. They can be served raw or cooked and may be whole, cut-up, or mashed. Make 1/2 of your plate fruits and vegetables.  Grains. All foods made from grains are part of the Grains Group. These include wheat, rice, oats, cornmeal, and barley. Grains are often used to make foods such as bread, pasta, oatmeal, cereal, tortillas, and grits. Grains should be no more than 1/4 of your plate. At least half of your grains should be whole grains.  Protein. This group includes meat, poultry, seafood, beans and  peas, eggs, processed soy products (such as tofu), nuts (including nut butters), and seeds. Make protein choices no more than 1/4 of your plate. Meat and poultry choices should be lean or low fat.  Dairy. The Dairy Group includes all fluid milk products and foods made from milk that contain calcium, such as yogurt and cheese. (Foods that have little calcium, such as cream, butter, and cream cheese, are not part of this group.) Most dairy choices should be low-fat or fat-free.  Things to limit  Eating healthy also means limiting these things in your diet:  Oils. Oils aren't a food group, but they do contain essential nutrients. These are fats that are liquid at room temperature. Including small amounts of oils in your diet is fine and can even be good for you. But it's important to watch how much oil you include in your diet. Oils include avocado, canola, corn, olive, soybean, vegetable, and sunflower. Foods that are mainly oil include mayonnaise, certain salad dressings, and soft margarines. You likely already get your daily oil allowance from the foods you eat.  Salt (sodium).  Many processed foods have a lot of sodium. To keep sodium intake down, eat fresh vegetables, meats, poultry, and seafood when possible. Buy low-sodium, reduced-sodium, or no-salt-added food products at the store. And don't add salt to your meals at home. Instead, season them with herbs and spices such as dill, oregano, cumin, and paprika. Or try adding flavor with vinegar, onion, garlic, or lemon or lime zest and juice.  Saturated fat . Saturated fats are most often found in animal products such as beef, pork, and chicken. They are often solid at room temperature, such as butter. To reduce your saturated fat intake, choose leaner cuts of meat and poultry. And try healthier cooking methods such as grilling, broiling, roasting, or baking. For a simple lower-fat swap, use plain nonfat yogurt instead of mayonnaise when making potato salad or  macaroni salad.  Added sugars.  These are sugars added to foods. They are in foods such as ice cream, candy, soda, fruit drinks, sports drinks, energy drinks, cookies, pastries, jams, and syrups. Cut down on added sugars by sharing sweet treats with a family member or friend. You can also choose fruit for dessert, and drink water or other unsweetened beverages.  Alcohol. Alcohol contains calories but few nutrients. If you don't drink alcohol, don't start drinking for any reason. But if you do choose to drink alcohol, do so only in moderation. This means no more than 2 drinks in a day for men and no more than 1 drink per day for women, on days when you have alcohol.  [employer] last reviewed this educational content on 1/1/2023 2000-2023 [employer]. All rights reserved. This information is not intended as a substitute for professional medical care. Always follow your healthcare professional's instructions.

## 2023-07-11 NOTE — RESULT ENCOUNTER NOTE
"The following letter pertains to your most recent diagnostic tests:    My name is Dr. Hay and I am covering for Dr. Dee who is out of the office today.      The total cholesterol and \"bad cholesterol\" LDL is better than last check.  The \"good cholesterol\" HDL is lower than last check.  Increasing physical activity can improve HDL (\"good cholesterol\") levels.  A good target to shoot for is 150 minutes (two and one-half hours) of moderate intensity physical activity per week.   The triglycerides are up a bit since last check.  Reducing carbohydrates and fats in your diet, losing weight and consuming less alcohol can improve your triglyceride levels.   I don't think there is need for medications for your cholesterol based on these results.  They should be rechecked in one year.        TSH (thyroid stimulating hormone) level is normal which indicates normal circulating thyroid hormone levels.      Sincerely,    Dr. Hay    The 10-year ASCVD risk score (Winter SIMENTAL, et al., 2019) is: 7.2%  "

## 2023-07-14 ENCOUNTER — HOSPITAL ENCOUNTER (OUTPATIENT)
Dept: BONE DENSITY | Facility: CLINIC | Age: 68
Discharge: HOME | End: 2023-07-14
Attending: INTERNAL MEDICINE | Admitting: INTERNAL MEDICINE
Payer: COMMERCIAL

## 2023-07-14 DIAGNOSIS — Z78.0 ASYMPTOMATIC POSTMENOPAUSAL STATUS: ICD-10-CM

## 2023-07-14 PROCEDURE — 77080 DXA BONE DENSITY AXIAL: CPT

## 2023-07-17 NOTE — RESULT ENCOUNTER NOTE
Phli Drummond    This is to inform you regarding your test result.    Bone density shows osteoporosis  Continue to take adequate calcium and vitamin D  You are also getting treatment through your oncologist for your bone density        Sincerely,      Dr.Nasima Luiz MD,FACP

## 2023-07-19 NOTE — PROGRESS NOTES
[facility] Care Coordination Contact    Completed 4 attempts to reach client with no response.  Member is officially unable to contact effective 7/18/23.  Completed MMIS entry.  Completed health plan required [facility] POC.    Follow-up Plan: CC will attempt to reach member in six months.    This CC note routed to PCP, Laury Dee RN  [facility]  [phone]

## 2023-07-23 NOTE — PROGRESS NOTES
Hoda is a patient from [facility] and is seen here for continuation of care for her metastatic ER+HER2- breast cancer.  She is a refugee from [facility] and was initially seen in clinic with her daughter.  The only data we have from [facility] [place] is an English translation of her records.       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  On 02/11/2014, she was seen by an oncologist.  The clinical staging of T4b N2 M1 was noted.   Her breast cancer was on the left side. There was no right breast cancer, confirmed by the patient and her daughter, correcting a possible error in the translated records.  ER was positive in 100% of the cells, FL at 14% and HER2 was 3+ positive.  It appears that this histopathologic information is on the mastectomy specimen. She underwent an MRI for staging of presumptive bone metastases which was performed 03/02/2014.  There were skeletal metastases in the thoracic vertebrae at 12, L1, L3, L5 and S1 vertebral body, ranging in size from 0.7-3.0 cm in size.  Ischial and right femur were also involved, as well as a large iliac mass measuring about 15 cm in the uterus. 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   Radiation Oncology consultation was performed 03/11/2014, and she was given radiation in 1 dose of 6 Gy to the right iliac lesion.        With the initial diagnosis, she initiated treatment with 6 cycles of CAF neoadjuvant chemotherapy 02/14, 07/07, 03/28, 04/18/14, 05/08 and 05/29/14. She also received monthly zoledronic acid.  She then underwent a modified left mastectomy of Palacios type and left lymphadenoectomy on 06/27/2014.   Pathologic examination showed number at [facility] was [id number] showed infiltrative grade 2 ductal cancer with 6 level 1 metastatic lympFollow up with Jossie for visits and trastuzumab on 2-5, 2-26, 3-19 with CBC, CMP.  Echocardiogram on 3-19.  Follow up with me 4-9 with CBC,  CMP, CA27.29 and CEA and with CT CAP on 4-8.h nodes and 3 level 2 metastatic lymph nodes.  The differentiation was intermediate.  The tumor was ER positive 70% of the cells, MO positive in 40% of the cells and HER2 was 3+ by immunohistochemistry and the Ki-67 labeling index was 20%. Her staging after surgery was stage IV, pT4b N2 M1.  I don't see a biopsy of the skeletal metastases.  She then had continuation with chemotherapy with 4 cycles of Herceptin and taxane with monthly zoledronic acid.  Tamoxifen was initiated.  She then had two years of Herceptin and a decision was made not to continue further Herceptin.  She continued on zoledronic acid every 3 months. She was clinically stable. [place] then [place] to the [place] new refugee from [facility].  She has now been changed to letrozole.  Denosumab has now been held for new diagnosis of osteonecrosis of the R maxilla.     History of cholecystectomy 2020.      TREATMENT HISTORY:  A. Initial diagnosis with metastatic breast cancer in [facility].  Neoadjuvant CAF x 6.   B. Left mastectomy. Left axillary node dissection.  C.  Radiation in 1 dose to R iliac region.  C. Herceptin for 2 years taxane for a prescribed course then stopped, monthly zoledronic acid.  She had 2 years of Herceptin with tamoxifen added after chemotherapy.  D.  Tamoxifen alone and zoledronic acid every 3 months starting 2014.  Letrozole was started   E.  [place]  We restarted Herceptin every 3 weeks and continued tamoxifen. Bone targeted agent changed to denosumab every 9 weeks. Zometa discontinued 2017.  Tamoxifen was changed to letrozole August 2019.    F.  Xgeva discontinued 12-17-19 because of dental issues.   G.  J+J vaccine March, 2021.  H.  Was on Zometa once May 11.  Reaction with eye lid swelling. Discontinued Zometa.  H.  Restart Xgeva 6-22-21.  Continuing the trastuzumab and letrozole.  Both tolerated well.       INTERVAL HISTORY  Hoda returns to clinic and has been feeling entirely well.   She denies pain, fatigue, depression or anxiety.  She continues on trastuzumab, letrozole and Xgeva.    REVIEW OF SYSTEMS:  A 10-point review of systems is entirely negative.    She is a eating a Mediterranean-style diet.  She is exercising by swimming.  I did advise adding a weightbearing exercise.  She takes vitamin D3 2000 International Units per day and calcium.  Her last DEXA scan performed a week ago shows a most valid negative T-score of -2.5 in the left hip, consistent with osteoporosis.    Notably, she has had no bowel or bladder problems.     PHYSICAL EXAMINATION:    /67   Pulse 87   Temp 98  F (36.7  C)   Resp 16   Wt 82.6 kg (182 lb)   SpO2 98%   BMI 33.07 kg/m    GENERAL:  Hoda appeared generally well.  HEENT:  She has no alopecia.  Examination of oropharynx reveals good dentition.  LYMPH:  There is no cervical, supraclavicular, subclavicular or axillary lymphadenopathy.  BREASTS:  Exam reveals no masses.  There is an inverted nipple, left.  Anterior chest wall reveals a left mastectomy incision well-healed without erythema or masses.  LUNGS:  Clear to percussion and auscultation.  CARDIAC:  Heart has a regular rate and rhythm, S1, S2.  ABDOMEN:  Soft, nontender, without hepatosplenomegaly.  EXTREMITIES:  Without edema.  PSYCH:  Mood and affect were normal.     LABORATORY DATA:  CMP and CBC within normal limits.  CEA, CA27.29 pending.      CT Chest/Abdomen/Pelvis 4/12/23  IMPRESSION:  1.  Stable appearance of sclerotic osseous lesions.  2.  Stable small pulmonary nodules.  3.  No new disease in the chest, abdomen and pelvis.        ASSESSMENT AND PLAN:       1.  Hoda Brush is a 68-year-old woman with a history of ER-positive, NH-positive, HER2 positive breast cancer.  She is from [facility], formally from [facility], and came to live in the [place] with her daughter.  The tumor is ER positive, NH positive and HER2 positive.  She had metastatic disease at the time of presentation  with bone-only metastases by report.  She underwent neoadjuvant CAF, had a left mastectomy, left axillary lymph node dissection, radiation to the right hip, which included the right iliac region where she had metastatic disease.  She was initially on tamoxifen but then was switched to letrozole.  She continues on this and every 3-week trastuzumab.  She has had no evidence of disease progression for the last 3 years.  Markers are low and stable.  We have continued Herceptin every 3 weeks as well as daily letrozole. CT CAP shows stable pulmonary nodules.   2.  Hoda Brush continues to do well on a combination of trastuzumab and letrozole. She has no evidence of disease progression.   Her ejection fraction remains stable.   She continues to do well on trastuzumab and letrozole.  She has no evidence of disease progression.  She will continue with every 4-month CT of the chest, abdomen and pelvis.  Next to be performed 08/16/2023 and follow-up visit with Kassidy on 08/17/2023.  3.   The right maxillary osteonecrosis has healed and she is able to continue with Xgeva every 3 months. Continue with Xgeva every 3 months.  4.  Continue the letrozole and trastuzuamb.  Letrozole has been well-tolerated, as has trastuzuamb.  No significant joint stiffness.  5.  Echo. Stable EF. --Echo in  showed normal EF with slight decline 55-60%.    6.  Discussion of bone health and right maxillary osteonecrosis.  All healed.   Xgeva is being continued.   7. Follow up.  Continue Herceptin through the end of 2023 alternating providers every 6 weeks. CT CAP every 4 months. Xgeva every 3 months. CBC, CMP every 3 weeks and CA27.29 and CEA every 6 weeks.       Thank you for allowing us to participate in this patient's care.  The patient was seen and evaluated by me.  I discussed the patient with the resident and agree with the findings and plan in the note, which was edited by me.     Sincerely,      Lisandro Aguiar MD  Professor  [employer]  [facility]  [phone].            I spent 25 minutes with the patient more than 50% of which was in counseling and coordination of care.

## 2023-07-25 DIAGNOSIS — K21.9 GASTROESOPHAGEAL REFLUX DISEASE WITHOUT ESOPHAGITIS: ICD-10-CM

## 2023-07-25 NOTE — TELEPHONE ENCOUNTER
Medication: Famotidine 20 mg tablet    Last prescribing provider:     Last clinic visit date: 07/06/23 w/Kassidy Robles    Any missed appointments or no-shows since last clinic visit?: No    Recommendations for requested medication (if none, N/A): N/A    Next clinic visit date: 07/27/23 w/    Any other pertinent information (if none, N/A): N/A    Pended and Routed to Provider.

## 2023-07-26 RX ORDER — FAMOTIDINE 20 MG/1
20 TABLET, FILM COATED ORAL 2 TIMES DAILY
Qty: 180 TABLET | Refills: 3 | Status: SHIPPED | OUTPATIENT
Start: 2023-07-26 | End: 2024-07-18

## 2023-07-27 ENCOUNTER — ONCOLOGY VISIT (OUTPATIENT)
Dept: ONCOLOGY | Facility: CLINIC | Age: 68
End: 2023-07-27
Attending: INTERNAL MEDICINE
Payer: COMMERCIAL

## 2023-07-27 ENCOUNTER — APPOINTMENT (OUTPATIENT)
Dept: LAB | Facility: CLINIC | Age: 68
End: 2023-07-27
Attending: INTERNAL MEDICINE
Payer: COMMERCIAL

## 2023-07-27 VITALS
BODY MASS INDEX: 33.07 KG/M2 | DIASTOLIC BLOOD PRESSURE: 67 MMHG | SYSTOLIC BLOOD PRESSURE: 110 MMHG | OXYGEN SATURATION: 98 % | WEIGHT: 182 LBS | TEMPERATURE: 98 F | HEART RATE: 87 BPM | RESPIRATION RATE: 16 BRPM

## 2023-07-27 DIAGNOSIS — C50.912 MALIGNANT NEOPLASM OF LEFT FEMALE BREAST, UNSPECIFIED ESTROGEN RECEPTOR STATUS, UNSPECIFIED SITE OF BREAST (H): Primary | ICD-10-CM

## 2023-07-27 LAB
ALBUMIN SERPL BCG-MCNC: 4 G/DL (ref 3.5–5.2)
ALP SERPL-CCNC: 51 U/L (ref 35–104)
ALT SERPL W P-5'-P-CCNC: 39 U/L (ref 0–50)
ANION GAP SERPL CALCULATED.3IONS-SCNC: 9 MMOL/L (ref 7–15)
AST SERPL W P-5'-P-CCNC: 32 U/L (ref 0–45)
BASOPHILS # BLD AUTO: 0 10E3/UL (ref 0–0.2)
BASOPHILS NFR BLD AUTO: 1 %
BILIRUB SERPL-MCNC: 0.3 MG/DL
BUN SERPL-MCNC: 15.5 MG/DL (ref 8–23)
CALCIUM SERPL-MCNC: 9.4 MG/DL (ref 8.8–10.2)
CEA SERPL-MCNC: 1.1 NG/ML
CHLORIDE SERPL-SCNC: 105 MMOL/L (ref 98–107)
CREAT SERPL-MCNC: 0.79 MG/DL (ref 0.51–0.95)
DEPRECATED HCO3 PLAS-SCNC: 27 MMOL/L (ref 22–29)
EOSINOPHIL # BLD AUTO: 0.2 10E3/UL (ref 0–0.7)
EOSINOPHIL NFR BLD AUTO: 3 %
ERYTHROCYTE [DISTWIDTH] IN BLOOD BY AUTOMATED COUNT: 13.6 % (ref 10–15)
GFR SERPL CREATININE-BSD FRML MDRD: 82 ML/MIN/1.73M2
GLUCOSE SERPL-MCNC: 99 MG/DL (ref 70–99)
HCT VFR BLD AUTO: 41.4 % (ref 35–47)
HGB BLD-MCNC: 12.7 G/DL (ref 11.7–15.7)
IMM GRANULOCYTES # BLD: 0 10E3/UL
IMM GRANULOCYTES NFR BLD: 0 %
LYMPHOCYTES # BLD AUTO: 2.8 10E3/UL (ref 0.8–5.3)
LYMPHOCYTES NFR BLD AUTO: 38 %
MCH RBC QN AUTO: 28.9 PG (ref 26.5–33)
MCHC RBC AUTO-ENTMCNC: 30.7 G/DL (ref 31.5–36.5)
MCV RBC AUTO: 94 FL (ref 78–100)
MONOCYTES # BLD AUTO: 0.4 10E3/UL (ref 0–1.3)
MONOCYTES NFR BLD AUTO: 6 %
NEUTROPHILS # BLD AUTO: 3.9 10E3/UL (ref 1.6–8.3)
NEUTROPHILS NFR BLD AUTO: 52 %
NRBC # BLD AUTO: 0 10E3/UL
NRBC BLD AUTO-RTO: 0 /100
PLATELET # BLD AUTO: 200 10E3/UL (ref 150–450)
POTASSIUM SERPL-SCNC: 4.5 MMOL/L (ref 3.4–5.3)
PROT SERPL-MCNC: 7.4 G/DL (ref 6.4–8.3)
RBC # BLD AUTO: 4.39 10E6/UL (ref 3.8–5.2)
SODIUM SERPL-SCNC: 141 MMOL/L (ref 136–145)
WBC # BLD AUTO: 7.3 10E3/UL (ref 4–11)

## 2023-07-27 PROCEDURE — 85004 AUTOMATED DIFF WBC COUNT: CPT | Performed by: INTERNAL MEDICINE

## 2023-07-27 PROCEDURE — 96413 CHEMO IV INFUSION 1 HR: CPT

## 2023-07-27 PROCEDURE — 250N000011 HC RX IP 250 OP 636: Mod: JZ | Performed by: INTERNAL MEDICINE

## 2023-07-27 PROCEDURE — G0463 HOSPITAL OUTPT CLINIC VISIT: HCPCS | Mod: 25 | Performed by: INTERNAL MEDICINE

## 2023-07-27 PROCEDURE — 80053 COMPREHEN METABOLIC PANEL: CPT | Performed by: INTERNAL MEDICINE

## 2023-07-27 PROCEDURE — 99214 OFFICE O/P EST MOD 30 MIN: CPT | Performed by: INTERNAL MEDICINE

## 2023-07-27 PROCEDURE — 86300 IMMUNOASSAY TUMOR CA 15-3: CPT | Performed by: INTERNAL MEDICINE

## 2023-07-27 PROCEDURE — 36591 DRAW BLOOD OFF VENOUS DEVICE: CPT | Performed by: INTERNAL MEDICINE

## 2023-07-27 PROCEDURE — 258N000003 HC RX IP 258 OP 636: Performed by: INTERNAL MEDICINE

## 2023-07-27 PROCEDURE — 82378 CARCINOEMBRYONIC ANTIGEN: CPT | Performed by: INTERNAL MEDICINE

## 2023-07-27 RX ORDER — EPINEPHRINE 1 MG/ML
0.3 INJECTION, SOLUTION INTRAMUSCULAR; SUBCUTANEOUS EVERY 5 MIN PRN
Status: CANCELLED | OUTPATIENT
Start: 2023-07-27

## 2023-07-27 RX ORDER — ALBUTEROL SULFATE 0.83 MG/ML
2.5 SOLUTION RESPIRATORY (INHALATION)
Status: CANCELLED | OUTPATIENT
Start: 2023-07-27

## 2023-07-27 RX ORDER — ACETAMINOPHEN 325 MG/1
650 TABLET ORAL
Status: CANCELLED | OUTPATIENT
Start: 2023-07-27

## 2023-07-27 RX ORDER — HEPARIN SODIUM (PORCINE) LOCK FLUSH IV SOLN 100 UNIT/ML 100 UNIT/ML
5 SOLUTION INTRAVENOUS EVERY 8 HOURS
Status: DISCONTINUED | OUTPATIENT
Start: 2023-07-27 | End: 2023-07-27 | Stop reason: HOSPADM

## 2023-07-27 RX ORDER — ALBUTEROL SULFATE 90 UG/1
1-2 AEROSOL, METERED RESPIRATORY (INHALATION)
Status: CANCELLED
Start: 2023-07-27

## 2023-07-27 RX ORDER — LORAZEPAM 2 MG/ML
0.5 INJECTION INTRAMUSCULAR EVERY 4 HOURS PRN
Status: CANCELLED
Start: 2023-07-27

## 2023-07-27 RX ORDER — EPINEPHRINE 0.3 MG/.3ML
0.3 INJECTION SUBCUTANEOUS EVERY 5 MIN PRN
Status: CANCELLED | OUTPATIENT
Start: 2023-07-27

## 2023-07-27 RX ORDER — SODIUM CHLORIDE 9 MG/ML
1000 INJECTION, SOLUTION INTRAVENOUS CONTINUOUS PRN
Status: CANCELLED
Start: 2023-07-27

## 2023-07-27 RX ORDER — DIPHENHYDRAMINE HCL 25 MG
50 CAPSULE ORAL ONCE
Status: CANCELLED
Start: 2023-07-27

## 2023-07-27 RX ORDER — DIPHENHYDRAMINE HYDROCHLORIDE 50 MG/ML
50 INJECTION INTRAMUSCULAR; INTRAVENOUS
Status: CANCELLED
Start: 2023-07-27

## 2023-07-27 RX ORDER — METHYLPREDNISOLONE SODIUM SUCCINATE 125 MG/2ML
125 INJECTION, POWDER, LYOPHILIZED, FOR SOLUTION INTRAMUSCULAR; INTRAVENOUS
Status: CANCELLED
Start: 2023-07-27

## 2023-07-27 RX ORDER — HEPARIN SODIUM (PORCINE) LOCK FLUSH IV SOLN 100 UNIT/ML 100 UNIT/ML
5 SOLUTION INTRAVENOUS EVERY 8 HOURS
Status: CANCELLED | OUTPATIENT
Start: 2023-07-27

## 2023-07-27 RX ADMIN — Medication 5 ML: at 09:52

## 2023-07-27 RX ADMIN — TRASTUZUMAB 450 MG: 150 INJECTION, POWDER, LYOPHILIZED, FOR SOLUTION INTRAVENOUS at 10:57

## 2023-07-27 RX ADMIN — Medication 5 ML: at 11:29

## 2023-07-27 RX ADMIN — SODIUM CHLORIDE 250 ML: 9 INJECTION, SOLUTION INTRAVENOUS at 10:55

## 2023-07-27 ASSESSMENT — PAIN SCALES - GENERAL: PAINLEVEL: NO PAIN (0)

## 2023-07-27 NOTE — NURSING NOTE
Chief Complaint   Patient presents with    Port Draw     Power needle. Heparin locked,vitals checked     Pia Schneider RN on 7/27/2023 at 9:43 AM

## 2023-07-27 NOTE — NURSING NOTE
"Oncology Rooming Note    July 27, 2023 9:58 AM   Hoda Brush is a 68 year old female who presents for:    Chief Complaint   Patient presents with    Port Draw     Power needle. Heparin locked,vitals checked    Oncology Clinic Visit     Breast cancer     Initial Vitals: /67   Pulse 87   Temp 98  F (36.7  C)   Resp 16   Wt 82.6 kg (182 lb)   SpO2 98%   BMI 33.07 kg/m   Estimated body mass index is 33.07 kg/m  as calculated from the following:    Height as of 7/11/23: 1.58 m (5' 2.21\").    Weight as of this encounter: 82.6 kg (182 lb). Body surface area is 1.9 meters squared.  No Pain (0) Comment: Data Unavailable   No LMP recorded. Patient is postmenopausal.  Allergies reviewed: Yes  Medications reviewed: Yes    Medications: Medication refills not needed today.  Pharmacy name entered into [email or web address]: [id number] - [place], [place] - [address] AT [address] [place] [address]    Clinical concerns:       Buster Becerra              "

## 2023-07-27 NOTE — LETTER
7/27/2023         RE: Hoda Brush  [address]  [facility] [facility] [address]        Dear Colleague,    Thank you for referring your patient, Hoda Brush, to the [facility]. Please see a copy of my visit note below.    Hoda is a patient from [facility] and is seen here for continuation of care for her metastatic ER+HER2- breast cancer.  She is a refugee from [facility] and was initially seen in clinic with her daughter.  The only data we have from [facility] is an English translation of her records.       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  On 02/11/2014, she was seen by an oncologist.  The clinical staging of T4b N2 M1 was noted.   Her breast cancer was on the left side. There was no right breast cancer, confirmed by the patient and her daughter, correcting a possible error in the translated records.  ER was positive in 100% of the cells, MA at 14% and HER2 was 3+ positive.  It appears that this histopathologic information is on the mastectomy specimen. She underwent an MRI for staging of presumptive bone metastases which was performed 03/02/2014.  There were skeletal metastases in the thoracic vertebrae at 12, L1, L3, L5 and S1 vertebral body, ranging in size from 0.7-3.0 cm in size.  Ischial and right femur were also involved, as well as a large iliac mass measuring about 15 cm in the uterus. 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   Radiation Oncology consultation was performed 03/11/2014, and she was given radiation in 1 dose of 6 Gy to the right iliac lesion.        With the initial diagnosis, she initiated treatment with 6 cycles of CAF neoadjuvant chemotherapy 02/14, 07/07, 03/28, 04/18/14, 05/08 and 05/29/14. She also received monthly zoledronic acid.  She then underwent a modified left mastectomy of Palacios type and left lymphadenoectomy on 06/27/2014.   Pathologic  [facility]  Dr. Maribeth Cristina Progress Note     Subjective:   Kennedi Andrews is a(n) 69year old female. Current complaints: Patient presents with:  Rheumatoid Arthritis: Has good days and bad, walking with walker today,   Last week arthritis was bad.   Wa examination showed number at [facility] was [id number] showed infiltrative grade 2 ductal cancer with 6 level 1 metastatic lympFollow up with Jossie for visits and trastuzumab on 2-5, 2-26, 3-19 with CBC, CMP.  Echocardiogram on 3-19.  Follow up with me 4-9 with CBC, CMP, CA27.29 and CEA and with CT CAP on 4-8.h nodes and 3 level 2 metastatic lymph nodes.  The differentiation was intermediate.  The tumor was ER positive 70% of the cells, ME positive in 40% of the cells and HER2 was 3+ by immunohistochemistry and the Ki-67 labeling index was 20%. Her staging after surgery was stage IV, pT4b N2 M1.  I don't see a biopsy of the skeletal metastases.  She then had continuation with chemotherapy with 4 cycles of Herceptin and taxane with monthly zoledronic acid.  Tamoxifen was initiated.  She then had two years of Herceptin and a decision was made not to continue further Herceptin.  She continued on zoledronic acid every 3 months. She was clinically stable. [place] then moved to the [place] new [place] from [facility].  She has now been changed to letrozole.  Denosumab has now been held for new diagnosis of osteonecrosis of the R maxilla.     History of cholecystectomy 2020.      TREATMENT HISTORY:  A. Initial diagnosis with metastatic breast cancer in [facility].  Neoadjuvant CAF x 6.   B. Left mastectomy. Left axillary node dissection.  C.  Radiation in 1 dose to R iliac region.  C. Herceptin for 2 years taxane for a prescribed course then stopped, monthly zoledronic acid.  She had 2 years of Herceptin with tamoxifen added after chemotherapy.  D.  Tamoxifen alone and zoledronic acid every 3 months starting 2014.  Letrozole was started   E.  [place]  We restarted Herceptin every 3 weeks and continued tamoxifen. Bone targeted agent changed to denosumab every 9 weeks. Zometa discontinued 2017.  Tamoxifen was changed to letrozole August 2019.    GIANLUCA  Xgeva discontinued 12-17-19 because of dental issues.   G.  J+J vaccine  March, 2021.  H.  Was on Zometa once May 11.  Reaction with eye lid swelling. Discontinued Zometa.  H.  Restart Xgeva 6-22-21.  Continuing the trastuzumab and letrozole.  Both tolerated well.       INTERVAL HISTORY  Hoda returns to clinic and has been feeling entirely well.  She denies pain, fatigue, depression or anxiety.  She continues on trastuzumab, letrozole and Xgeva.    REVIEW OF SYSTEMS:  A 10-point review of systems is entirely negative.    She is a eating a Mediterranean-style diet.  She is exercising by swimming.  I did advise adding a weightbearing exercise.  She takes vitamin D3 2000 International Units per day and calcium.  Her last DEXA scan performed a week ago shows a most valid negative T-score of -2.5 in the left hip, consistent with osteoporosis.    Notably, she has had no bowel or bladder problems.     PHYSICAL EXAMINATION:    /67   Pulse 87   Temp 98  F (36.7  C)   Resp 16   Wt 82.6 kg (182 lb)   SpO2 98%   BMI 33.07 kg/m    GENERAL:  Hoda appeared generally well.  HEENT:  She has no alopecia.  Examination of oropharynx reveals good dentition.  LYMPH:  There is no cervical, supraclavicular, subclavicular or axillary lymphadenopathy.  BREASTS:  Exam reveals no masses.  There is an inverted nipple, left.  Anterior chest wall reveals a left mastectomy incision well-healed without erythema or masses.  LUNGS:  Clear to percussion and auscultation.  CARDIAC:  Heart has a regular rate and rhythm, S1, S2.  ABDOMEN:  Soft, nontender, without hepatosplenomegaly.  EXTREMITIES:  Without edema.  PSYCH:  Mood and affect were normal.     LABORATORY DATA:  CMP and CBC within normal limits.  CEA, CA27.29 pending.      CT Chest/Abdomen/Pelvis 4/12/23  IMPRESSION:  1.  Stable appearance of sclerotic osseous lesions.  2.  Stable small pulmonary nodules.  3.  No new disease in the chest, abdomen and pelvis.        ASSESSMENT AND PLAN:       1.  Hoda Brush is a 68-year-old woman with a  history of ER-positive, NC-positive, HER2 positive breast cancer.  She is from [facility], formally from [facility], and came to live in the [place] with [place].  The tumor is ER positive, NC positive and HER2 positive.  She had metastatic disease at the time of presentation with bone-only metastases by report.  She underwent neoadjuvant CAF, had a left mastectomy, left axillary lymph node dissection, radiation to the right hip, which included the right iliac region where she had metastatic disease.  She was initially on tamoxifen but then was switched to letrozole.  She continues on this and every 3-week trastuzumab.  She has had no evidence of disease progression for the last 3 years.  Markers are low and stable.  We have continued Herceptin every 3 weeks as well as daily letrozole. CT CAP shows stable pulmonary nodules.   2.  Hoda Brush continues to do well on a combination of trastuzumab and letrozole. She has no evidence of disease progression.   Her ejection fraction remains stable.   She continues to do well on trastuzumab and letrozole.  She has no evidence of disease progression.  She will continue with every 4-month CT of the chest, abdomen and pelvis.  Next to be performed 08/16/2023 and follow-up visit with Kassidy on 08/17/2023.  3.   The right maxillary osteonecrosis has healed and she is able to continue with Xgeva every 3 months. Continue with Xgeva every 3 months.  4.  Continue the letrozole and trastuzuamb.  Letrozole has been well-tolerated, as has trastuzuamb.  No significant joint stiffness.  5.  Echo. Stable EF. --Echo in  showed normal EF with slight decline 55-60%.    6.  Discussion of bone health and right maxillary osteonecrosis.  All healed.   Xgeva is being continued.   7. Follow up.  Continue Herceptin through the end of 2023 alternating providers every 6 weeks. CT CAP every 4 months. Xgeva every 3 months. CBC, CMP every 3 weeks and CA27.29 and CEA every 6 weeks.       Thank  you for allowing us to participate in this patient's care.  The patient was seen and evaluated by me.  I discussed the patient with the resident and agree with the findings and plan in the note, which was edited by me.     Sincerely,      Liasndro Aguiar MD  Professor  [facility]  [phone].            I spent 25 minutes with the patient more than 50% of which was in counseling and coordination of care.

## 2023-07-29 LAB — CANCER AG27-29 SERPL-ACNC: <9 U/ML

## 2023-08-16 ENCOUNTER — ANCILLARY PROCEDURE (OUTPATIENT)
Dept: CT IMAGING | Facility: CLINIC | Age: 68
End: 2023-08-16
Attending: INTERNAL MEDICINE
Payer: COMMERCIAL

## 2023-08-16 DIAGNOSIS — C50.912 MALIGNANT NEOPLASM OF LEFT FEMALE BREAST, UNSPECIFIED ESTROGEN RECEPTOR STATUS, UNSPECIFIED SITE OF BREAST (H): ICD-10-CM

## 2023-08-16 PROCEDURE — 250N000011 HC RX IP 250 OP 636: Performed by: INTERNAL MEDICINE

## 2023-08-16 PROCEDURE — 74177 CT ABD & PELVIS W/CONTRAST: CPT

## 2023-08-16 PROCEDURE — 250N000009 HC RX 250: Performed by: INTERNAL MEDICINE

## 2023-08-16 RX ORDER — HEPARIN SODIUM (PORCINE) LOCK FLUSH IV SOLN 100 UNIT/ML 100 UNIT/ML
5 SOLUTION INTRAVENOUS ONCE
Status: COMPLETED | OUTPATIENT
Start: 2023-08-16 | End: 2023-08-16

## 2023-08-16 RX ORDER — IOPAMIDOL 755 MG/ML
100 INJECTION, SOLUTION INTRAVASCULAR ONCE
Status: COMPLETED | OUTPATIENT
Start: 2023-08-16 | End: 2023-08-16

## 2023-08-16 RX ADMIN — SODIUM CHLORIDE 40 ML: 9 INJECTION, SOLUTION INTRAVENOUS at 10:26

## 2023-08-16 RX ADMIN — HEPARIN SODIUM (PORCINE) LOCK FLUSH IV SOLN 100 UNIT/ML 5 ML: 100 SOLUTION at 10:26

## 2023-08-16 RX ADMIN — IOPAMIDOL 100 ML: 755 INJECTION, SOLUTION INTRAVENOUS at 10:26

## 2023-08-16 NOTE — PROGRESS NOTES
Aug 17, 2023    ONCOLOGY RETURN VISIT:     Hoda is a patient from [facility] [place] and is seen here for continuation of care for her metastatic ER+HER2- breast cancer.  She is a refugee from [facility] and was initially seen in clinic with her daughter.  The only data we have from [facility] is an English translation of her records.       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  On 02/11/2014, she was seen by an oncologist.  The clinical staging of T4b N2 M1 was noted.   Her breast cancer was on the left side. There was no right breast cancer, confirmed by the patient and her daughter, correcting a possible error in the translated records.  ER was positive in 100% of the cells, DE at 14% and HER2 was 3+ positive.  It appears that this histopathologic information is on the mastectomy specimen. She underwent an MRI for staging of presumptive bone metastases which was performed 03/02/2014.  There were skeletal metastases in the thoracic vertebrae at 12, L1, L3, L5 and S1 vertebral body, ranging in size from 0.7-3.0 cm in size.  Ischial and right femur were also involved, as well as a large iliac mass measuring about 15 cm in the uterus. 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   Radiation Oncology consultation was performed 03/11/2014, and she was given radiation in 1 dose of 6 Gy to the right iliac lesion.        With the initial diagnosis, she initiated treatment with 6 cycles of CAF neoadjuvant chemotherapy 02/14, 07/07, 03/28, 04/18/14, 05/08 and 05/29/14. She also received monthly zoledronic acid.  She then underwent a modified left mastectomy of Palacios type and left lymphadenoectomy on 06/27/2014.   Pathologic examination showed number at [facility] was [id number] showed infiltrative grade 2 ductal cancer with 6 level 1 metastatic lympFollow up with Jossie for visits and trastuzumab on 2-5, 2-26, 3-19 with CBC, CMP.  Echocardiogram on  3-19.  Follow up with me 4-9 with CBC, CMP, CA27.29 and CEA and with CT CAP on 4-8.h nodes and 3 level 2 metastatic lymph nodes.  The differentiation was intermediate.  The tumor was ER positive 70% of the cells, VA positive in 40% of the cells and HER2 was 3+ by immunohistochemistry and the Ki-67 labeling index was 20%. Her staging after surgery was stage IV, pT4b N2 M1.  I don't see a biopsy of the skeletal metastases.  She then had continuation with chemotherapy with 4 cycles of Herceptin and taxane with monthly zoledronic acid.  Tamoxifen was initiated.  She then had two years of Herceptin and a decision was made not to continue further Herceptin.  She continued on zoledronic acid every 3 months. She was clinically stable. [place] then moved [place] the [place] new refugee from [facility].  She has now been changed to letrozole.  Denosumab has now been held for new diagnosis of osteonecrosis of the R maxilla.     History of cholecystectomy 2020.      TREATMENT HISTORY:  A. Initial diagnosis with metastatic breast cancer in [facility].  Neoadjuvant CAF x 6.   B. Left mastectomy. Left axillary node dissection.  C.  Radiation in 1 dose to R iliac region.  C. Herceptin for 2 years taxane for a prescribed course then stopped, monthly zoledronic acid.  She had 2 years of Herceptin with tamoxifen added after chemotherapy.  D.  Tamoxifen alone and zoledronic acid every 3 months starting 2014.  Letrozole was started   E[place]  [place]  We restarted Herceptin every 3 weeks and continued tamoxifen. Bone targeted agent changed to denosumab every 9 weeks. Zometa discontinued 2017.  Tamoxifen was changed to letrozole August 2019.    F.  Xgeva discontinued 12-17-19 because of dental issues.   G.  J+J vaccine March, 2021.  H.  Was on Zometa once May 11.  Reaction with eye lid swelling. Discontinued Zometa.  H.  Restart Xgeva 6-22-21.  Continuing the trastuzumab and letrozole.  Both tolerated well.       INTERVAL HISTORY  Hoda returns to  clinic today and is feeling well. She has no new concerns today. She reports that in the mornings, she will have occasional R posterior hip pain that improves with light stretching and movement over the past 2-3 weeks. No tylenol use or NSAIDs are required for the pain given mild nature. Pain does not radiate. She denies any other new pains or bone pains. She remains active with swimming and walking. No changes in energy. No mood concerns. No chest pain, shortness of breath, or cough. and has been feeling entirely well. No bowel or bladder concerns. She continues on trastuzumab, letrozole and Xgeva.    ROS: 12 point ROS neg other than the symptoms noted above in the HPI.    PHYSICAL EXAMINATION:    /73   Pulse 75   Temp 98.3  F (36.8  C) (Oral)   Resp 16   Wt 82.3 kg (181 lb 8 oz)   SpO2 94%   BMI 32.98 kg/m    General: No acute distress  HEENT: Sclera anicteric. Oral mucosa pink and moist.  No mucositis or thrush. Upper dentures in place.   Lymph: No lymphadenopathy in neck  Heart: Regular, rate, and rhythm  Lungs: Clear to ascultation bilaterally  Abdomen: Positive bowel sounds. Soft, non-distended, non-tender. No organomegaly or mass.   Extremities: no lower extremity edema  Neuro: Cranial nerves grossly intact  Rash: none     LABORATORY DATA:   Most Recent 3 CBC's:  Recent Labs   Lab Test 08/17/23  0800 07/27/23  0951 07/06/23  1216   WBC 6.5 7.3 7.2   HGB 12.6 12.7 12.5   MCV 93 94 93    200 230   ANEUTAUTO 3.2 3.9 3.7     Most Recent 3 BMP's:  Recent Labs   Lab Test 08/17/23  0800 07/27/23  0951 07/06/23  1216    141 141   POTASSIUM 4.2 4.5 4.1   CHLORIDE 106 105 105   CO2 28 27 27   BUN 15.1 15.5 12.2   CR 0.85 0.79 0.79   ANIONGAP 6* 9 9   TAYLER 9.3 9.4 8.9   * 99 89   PROTTOTAL 7.3 7.4 7.7   ALBUMIN 4.2 4.0 4.2    Most Recent 3 LFT's:  Recent Labs   Lab Test 08/17/23  0800 07/27/23  0951 07/06/23  1216   AST 36 32 46*   ALT 41 39 44   ALKPHOS 51 51 52   BILITOTAL 0.3 0.3 0.3     Most Recent 2 TSH and T4:  Recent Labs   Lab Test 07/06/23  1216 12/09/21  1438   TSH 0.77 1.01     08/16/23 CT CAP:  IMPRESSION:  1.  Stable sclerotic osseous metastases.  2.  Stable pulmonary nodules.  3.  No new disease in the chest, abdomen and pelvis.    I reviewed the above labs and imaging today.     ASSESSMENT AND PLAN:       Yakov Brush is a 68-year-old woman with a history of metastatic ER-positive, W[place]-positive, HER2 positive breast cancer to the bones.  She is from [facility], formally from [facility], and came to live in the [place] with [place].   She had metastatic disease at the time of presentation with bone-only metastases by report.  She underwent neoadjuvant CAF, had a left mastectomy, left axillary lymph node dissection, radiation to the right hip, which included the right iliac region where she had metastatic disease.  She was initially on tamoxifen but then was switched to letrozole.  She continues on this and every 3-week trastuzumab.  She has had no evidence of disease progression for the last 3 years.  Markers are low and stable, pending today.  We have continued Herceptin every 3 weeks as well as daily letrozole. CT CAP shows stable pulmonary nodules and stable sclerotic bone metastasis.   - Labs and clinically appropriate to continue herceptin and letrozole.   - 07/2023 echo overall stable with EF of 55%, unchanged from 04/2023 study. Next due in October 2023.   - Continue CT CAP every 4 months.     # Bone Metastasis   - Hx of R maxillary osteonecrosis has healed. Cleared to restart Xgeva. Will continue every 3 months and administer today, 08/17/23. Per patient recent dental exam yesterday 08/16/23 with no concerns.   - R hip pain per history today, appears musculoskeletal in nature. Personally reviewed the images from recent CT scan with no fractures or obvious source of pain. Recommend trial of stretching, ice/heat, epsom salts, and lidocaine patches. Not currently requiring  tylenol or NSAIDs. Monitor closely.     # Follow up   - continue herceptin with provider visits every 6 weeks.     30 minutes spent on the date of the encounter doing chart review, review of test results, patient visit, and documentation     Anya Cartwright PA-C

## 2023-08-17 ENCOUNTER — INFUSION THERAPY VISIT (OUTPATIENT)
Dept: ONCOLOGY | Facility: CLINIC | Age: 68
End: 2023-08-17
Attending: INTERNAL MEDICINE
Payer: COMMERCIAL

## 2023-08-17 ENCOUNTER — APPOINTMENT (OUTPATIENT)
Dept: LAB | Facility: CLINIC | Age: 68
End: 2023-08-17
Attending: INTERNAL MEDICINE
Payer: COMMERCIAL

## 2023-08-17 ENCOUNTER — ONCOLOGY VISIT (OUTPATIENT)
Dept: ONCOLOGY | Facility: CLINIC | Age: 68
End: 2023-08-17
Attending: NURSE PRACTITIONER
Payer: COMMERCIAL

## 2023-08-17 VITALS
TEMPERATURE: 98.3 F | RESPIRATION RATE: 16 BRPM | SYSTOLIC BLOOD PRESSURE: 122 MMHG | OXYGEN SATURATION: 94 % | DIASTOLIC BLOOD PRESSURE: 73 MMHG | WEIGHT: 181.5 LBS | BODY MASS INDEX: 32.98 KG/M2 | HEART RATE: 75 BPM

## 2023-08-17 DIAGNOSIS — C50.912 MALIGNANT NEOPLASM OF LEFT FEMALE BREAST, UNSPECIFIED ESTROGEN RECEPTOR STATUS, UNSPECIFIED SITE OF BREAST (H): ICD-10-CM

## 2023-08-17 DIAGNOSIS — C79.51 MALIGNANT NEOPLASM METASTATIC TO BONE (H): Primary | ICD-10-CM

## 2023-08-17 DIAGNOSIS — Z79.899 ENCOUNTER FOR MONITORING CARDIOTOXIC DRUG THERAPY: ICD-10-CM

## 2023-08-17 DIAGNOSIS — C79.51 MALIGNANT NEOPLASM METASTATIC TO BONE (H): ICD-10-CM

## 2023-08-17 DIAGNOSIS — Z51.81 ENCOUNTER FOR MONITORING CARDIOTOXIC DRUG THERAPY: ICD-10-CM

## 2023-08-17 LAB
ALBUMIN SERPL BCG-MCNC: 4.2 G/DL (ref 3.5–5.2)
ALP SERPL-CCNC: 51 U/L (ref 35–104)
ALT SERPL W P-5'-P-CCNC: 41 U/L (ref 0–50)
ANION GAP SERPL CALCULATED.3IONS-SCNC: 6 MMOL/L (ref 7–15)
AST SERPL W P-5'-P-CCNC: 36 U/L (ref 0–45)
BASOPHILS # BLD AUTO: 0 10E3/UL (ref 0–0.2)
BASOPHILS NFR BLD AUTO: 1 %
BILIRUB SERPL-MCNC: 0.3 MG/DL
BUN SERPL-MCNC: 15.1 MG/DL (ref 8–23)
CALCIUM SERPL-MCNC: 9.3 MG/DL (ref 8.8–10.2)
CEA SERPL-MCNC: 1 NG/ML
CHLORIDE SERPL-SCNC: 106 MMOL/L (ref 98–107)
CREAT SERPL-MCNC: 0.85 MG/DL (ref 0.51–0.95)
DEPRECATED HCO3 PLAS-SCNC: 28 MMOL/L (ref 22–29)
EOSINOPHIL # BLD AUTO: 0.2 10E3/UL (ref 0–0.7)
EOSINOPHIL NFR BLD AUTO: 3 %
ERYTHROCYTE [DISTWIDTH] IN BLOOD BY AUTOMATED COUNT: 13.3 % (ref 10–15)
GFR SERPL CREATININE-BSD FRML MDRD: 75 ML/MIN/1.73M2
GLUCOSE SERPL-MCNC: 103 MG/DL (ref 70–99)
HCT VFR BLD AUTO: 39.5 % (ref 35–47)
HGB BLD-MCNC: 12.6 G/DL (ref 11.7–15.7)
IMM GRANULOCYTES # BLD: 0 10E3/UL
IMM GRANULOCYTES NFR BLD: 0 %
LYMPHOCYTES # BLD AUTO: 2.6 10E3/UL (ref 0.8–5.3)
LYMPHOCYTES NFR BLD AUTO: 40 %
MCH RBC QN AUTO: 29.6 PG (ref 26.5–33)
MCHC RBC AUTO-ENTMCNC: 31.9 G/DL (ref 31.5–36.5)
MCV RBC AUTO: 93 FL (ref 78–100)
MONOCYTES # BLD AUTO: 0.5 10E3/UL (ref 0–1.3)
MONOCYTES NFR BLD AUTO: 7 %
NEUTROPHILS # BLD AUTO: 3.2 10E3/UL (ref 1.6–8.3)
NEUTROPHILS NFR BLD AUTO: 49 %
NRBC # BLD AUTO: 0 10E3/UL
NRBC BLD AUTO-RTO: 0 /100
PLATELET # BLD AUTO: 236 10E3/UL (ref 150–450)
POTASSIUM SERPL-SCNC: 4.2 MMOL/L (ref 3.4–5.3)
PROT SERPL-MCNC: 7.3 G/DL (ref 6.4–8.3)
RBC # BLD AUTO: 4.26 10E6/UL (ref 3.8–5.2)
SODIUM SERPL-SCNC: 140 MMOL/L (ref 136–145)
WBC # BLD AUTO: 6.5 10E3/UL (ref 4–11)

## 2023-08-17 PROCEDURE — 99215 OFFICE O/P EST HI 40 MIN: CPT

## 2023-08-17 PROCEDURE — 258N000003 HC RX IP 258 OP 636

## 2023-08-17 PROCEDURE — 80053 COMPREHEN METABOLIC PANEL: CPT

## 2023-08-17 PROCEDURE — 96372 THER/PROPH/DIAG INJ SC/IM: CPT | Performed by: INTERNAL MEDICINE

## 2023-08-17 PROCEDURE — 96372 THER/PROPH/DIAG INJ SC/IM: CPT | Mod: 59 | Performed by: INTERNAL MEDICINE

## 2023-08-17 PROCEDURE — 250N000011 HC RX IP 250 OP 636: Mod: JZ

## 2023-08-17 PROCEDURE — G0463 HOSPITAL OUTPT CLINIC VISIT: HCPCS

## 2023-08-17 PROCEDURE — 96413 CHEMO IV INFUSION 1 HR: CPT

## 2023-08-17 PROCEDURE — 82378 CARCINOEMBRYONIC ANTIGEN: CPT

## 2023-08-17 PROCEDURE — 85025 COMPLETE CBC W/AUTO DIFF WBC: CPT

## 2023-08-17 PROCEDURE — 86300 IMMUNOASSAY TUMOR CA 15-3: CPT

## 2023-08-17 PROCEDURE — 36591 DRAW BLOOD OFF VENOUS DEVICE: CPT

## 2023-08-17 PROCEDURE — 250N000011 HC RX IP 250 OP 636: Mod: JZ | Performed by: INTERNAL MEDICINE

## 2023-08-17 RX ORDER — EPINEPHRINE 1 MG/ML
0.3 INJECTION, SOLUTION INTRAMUSCULAR; SUBCUTANEOUS EVERY 5 MIN PRN
Status: CANCELLED | OUTPATIENT
Start: 2023-08-17

## 2023-08-17 RX ORDER — HEPARIN SODIUM (PORCINE) LOCK FLUSH IV SOLN 100 UNIT/ML 100 UNIT/ML
500 SOLUTION INTRAVENOUS ONCE
Status: COMPLETED | OUTPATIENT
Start: 2023-08-17 | End: 2023-08-17

## 2023-08-17 RX ORDER — HEPARIN SODIUM (PORCINE) LOCK FLUSH IV SOLN 100 UNIT/ML 100 UNIT/ML
5 SOLUTION INTRAVENOUS EVERY 8 HOURS
Status: DISCONTINUED | OUTPATIENT
Start: 2023-08-17 | End: 2023-08-17 | Stop reason: HOSPADM

## 2023-08-17 RX ORDER — SODIUM CHLORIDE 9 MG/ML
1000 INJECTION, SOLUTION INTRAVENOUS CONTINUOUS PRN
Status: CANCELLED
Start: 2023-08-17

## 2023-08-17 RX ORDER — DIPHENHYDRAMINE HYDROCHLORIDE 50 MG/ML
50 INJECTION INTRAMUSCULAR; INTRAVENOUS
Status: CANCELLED
Start: 2023-08-17

## 2023-08-17 RX ORDER — HEPARIN SODIUM (PORCINE) LOCK FLUSH IV SOLN 100 UNIT/ML 100 UNIT/ML
5 SOLUTION INTRAVENOUS EVERY 8 HOURS
Status: CANCELLED | OUTPATIENT
Start: 2023-08-17

## 2023-08-17 RX ORDER — ALBUTEROL SULFATE 90 UG/1
1-2 AEROSOL, METERED RESPIRATORY (INHALATION)
Status: CANCELLED
Start: 2023-08-17

## 2023-08-17 RX ORDER — ALBUTEROL SULFATE 0.83 MG/ML
2.5 SOLUTION RESPIRATORY (INHALATION)
Status: CANCELLED | OUTPATIENT
Start: 2023-08-17

## 2023-08-17 RX ORDER — DIPHENHYDRAMINE HCL 25 MG
50 CAPSULE ORAL ONCE
Status: CANCELLED
Start: 2023-08-17

## 2023-08-17 RX ORDER — LORAZEPAM 2 MG/ML
0.5 INJECTION INTRAMUSCULAR EVERY 4 HOURS PRN
Status: CANCELLED
Start: 2023-08-17

## 2023-08-17 RX ORDER — EPINEPHRINE 0.3 MG/.3ML
0.3 INJECTION SUBCUTANEOUS EVERY 5 MIN PRN
Status: CANCELLED | OUTPATIENT
Start: 2023-08-17

## 2023-08-17 RX ORDER — ACETAMINOPHEN 325 MG/1
650 TABLET ORAL
Status: CANCELLED | OUTPATIENT
Start: 2023-08-17

## 2023-08-17 RX ORDER — METHYLPREDNISOLONE SODIUM SUCCINATE 125 MG/2ML
125 INJECTION, POWDER, LYOPHILIZED, FOR SOLUTION INTRAMUSCULAR; INTRAVENOUS
Status: CANCELLED
Start: 2023-08-17

## 2023-08-17 RX ADMIN — DENOSUMAB 120 MG: 120 INJECTION SUBCUTANEOUS at 09:34

## 2023-08-17 RX ADMIN — TRASTUZUMAB 450 MG: 150 INJECTION, POWDER, LYOPHILIZED, FOR SOLUTION INTRAVENOUS at 09:31

## 2023-08-17 RX ADMIN — Medication 500 UNITS: at 08:02

## 2023-08-17 RX ADMIN — Medication 5 ML: at 10:02

## 2023-08-17 RX ADMIN — SODIUM CHLORIDE 250 ML: 9 INJECTION, SOLUTION INTRAVENOUS at 09:31

## 2023-08-17 ASSESSMENT — PAIN SCALES - GENERAL: PAINLEVEL: NO PAIN (0)

## 2023-08-17 NOTE — PROGRESS NOTES
Infusion Nursing Note:  Hoda Brush presents today for Cycle 102 Day 1 Herceptin + Xgeva.    Patient seen by provider today: Yes: SEMAJ Keller   present during visit today: Not Applicable.    Note: Pt presents to infusion feeling well. She offers no new concerns following her provider appointment this morning.    Hoda confirms she continues to take Calcium and Vitamin D as prescribed and denies any dental concerns or upcoming procedures, her biannual Dentist visit was just yesterday.     Intravenous Access:  Implanted Port.    Treatment Conditions:  Lab Results   Component Value Date    HGB 12.6 08/17/2023    WBC 6.5 08/17/2023    ANEU 3.1 06/22/2021    ANEUTAUTO 3.2 08/17/2023     08/17/2023        Lab Results   Component Value Date     08/17/2023    POTASSIUM 4.2 08/17/2023    CR 0.85 08/17/2023    TAYLER 9.3 08/17/2023    BILITOTAL 0.3 08/17/2023    ALBUMIN 4.2 08/17/2023    ALT 41 08/17/2023    AST 36 08/17/2023     ECHO/MUGA completed 7/11/23  EF 55%.      Post Infusion Assessment:  Patient tolerated infusion without incident.  Patient tolerated injection to RIGHT arm without incident.  Blood return noted pre and post infusion.  Site patent and intact, free from redness, edema or discomfort.  No evidence of extravasations.  Access discontinued per protocol.       Discharge Plan:   Patient declined prescription refills.  Discharge instructions reviewed with: Patient.  Patient and/or family verbalized understanding of discharge instructions and all questions answered.  AVS to patient via [employer]. Patient will return 9/7/23 for next appointment.   Patient discharged in stable condition accompanied by: self.  Departure Mode: Ambulatory.      Isabella Mistry RN

## 2023-08-17 NOTE — Clinical Note
8/17/2023         RE: Hoda Brush  [address]  [facility] [address]        Dear Colleague,    Thank you for referring your patient, Hoda Brush, to the [facility]. Please see a copy of my visit note below.    Hoda is a patient from [facility] and is seen here for continuation of care for her metastatic ER+HER2- breast cancer.  She is a refugee from [facility] and was initially seen in clinic with her daughter.  The only data we have from [facility] is an English translation of her records.       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  On 02/11/2014, she was seen by an oncologist.  The clinical staging of T4b N2 M1 was noted.   Her breast cancer was on the left side. There was no right breast cancer, confirmed by the patient and her daughter, correcting a possible error in the translated records.  ER was positive in 100% of the cells, RI at 14% and HER2 was 3+ positive.  It appears that this histopathologic information is on the mastectomy specimen. She underwent an MRI for staging of presumptive bone metastases which was performed 03/02/2014.  There were skeletal metastases in the thoracic vertebrae at 12, L1, L3, L5 and S1 vertebral body, ranging in size from 0.7-3.0 cm in size.  Ischial and right femur were also involved, as well as a large iliac mass measuring about 15 cm in the uterus. 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   Radiation Oncology consultation was performed 03/11/2014, and she was given radiation in 1 dose of 6 Gy to the right iliac lesion.        With the initial diagnosis, she initiated treatment with 6 cycles of CAF neoadjuvant chemotherapy 02/14, 07/07, 03/28, 04/18/14, 05/08 and 05/29/14. She also received monthly zoledronic acid.  She then underwent a modified left mastectomy of Palacios type and left lymphadenoectomy on 06/27/2014.   Pathologic  examination showed number at [facility] was [id number] showed infiltrative grade 2 ductal cancer with 6 level 1 metastatic lympFollow up with Jossie for visits and trastuzumab on 2-5, 2-26, 3-19 with CBC, CMP.  Echocardiogram on 3-19.  Follow up with me 4-9 with CBC, CMP, CA27.29 and CEA and with CT CAP on 4-8.h nodes and 3 level 2 metastatic lymph nodes.  The differentiation was intermediate.  The tumor was ER positive 70% of the cells, TN positive in 40% of the cells and HER2 was 3+ by immunohistochemistry and the Ki-67 labeling index was 20%. Her staging after surgery was stage IV, pT4b N2 M1.  I don't see a biopsy of the skeletal metastases.  She then had continuation with chemotherapy with 4 cycles of Herceptin and taxane with monthly zoledronic acid.  Tamoxifen was initiated.  She then had two years of Herceptin and a decision was made not to continue further Herceptin.  She continued on zoledronic acid every 3 months. She was clinically stable. [place] then [place] the [place] new [place] from [facility].  She has now been changed to letrozole.  Denosumab has now been held for new diagnosis of osteonecrosis of the R maxilla.     History of cholecystectomy 2020.      TREATMENT HISTORY:  A. Initial diagnosis with metastatic breast cancer in [facility].  Neoadjuvant CAF x 6.   B. Left mastectomy. Left axillary node dissection.  C.  Radiation in 1 dose to R iliac region.  C. Herceptin for 2 years taxane for a prescribed course then stopped, monthly zoledronic acid.  She had 2 years of Herceptin with tamoxifen added after chemotherapy.  D.  Tamoxifen alone and zoledronic acid every 3 months starting 2014.  Letrozole was started   E.  [place]  We restarted Herceptin every 3 weeks and continued tamoxifen. Bone targeted agent changed to denosumab every 9 weeks. Zometa discontinued 2017.  Tamoxifen was changed to letrozole August 2019.    GIANLUCA  Xgeva discontinued 12-17-19 because of dental issues.   G.  J+J vaccine  March, 2021.  H.  Was on Zometa once May 11.  Reaction with eye lid swelling. Discontinued Zometa.  H.  Restart Xgeva 6-22-21.  Continuing the trastuzumab and letrozole.  Both tolerated well.       INTERVAL HISTORY  Hoda returns to clinic and has been feeling entirely well.  She denies pain, fatigue, depression or anxiety.  She continues on trastuzumab, letrozole and Xgeva.    REVIEW OF SYSTEMS:  A 10-point review of systems is entirely negative.    She is a eating a Mediterranean-style diet.  She is exercising by swimming.  I did advise adding a weightbearing exercise.  She takes vitamin D3 2000 International Units per day and calcium.  Her last DEXA scan performed a week ago shows a most valid negative T-score of -2.5 in the left hip, consistent with osteoporosis.    Notably, she has had no bowel or bladder problems.     PHYSICAL EXAMINATION:    There were no vitals taken for this visit.  GENERAL:  Hoda appeared generally well.  HEENT:  She has no alopecia.  Examination of oropharynx reveals good dentition.  LYMPH:  There is no cervical, supraclavicular, subclavicular or axillary lymphadenopathy.  BREASTS:  Exam reveals no masses.  There is an inverted nipple, left.  Anterior chest wall reveals a left mastectomy incision well-healed without erythema or masses.  LUNGS:  Clear to percussion and auscultation.  CARDIAC:  Heart has a regular rate and rhythm, S1, S2.  ABDOMEN:  Soft, nontender, without hepatosplenomegaly.  EXTREMITIES:  Without edema.  PSYCH:  Mood and affect were normal.     LABORATORY DATA:  CMP and CBC within normal limits.  CEA, CA27.29 pending.      CT Chest/Abdomen/Pelvis 4/12/23  IMPRESSION:  1.  Stable appearance of sclerotic osseous lesions.  2.  Stable small pulmonary nodules.  3.  No new disease in the chest, abdomen and pelvis.        ASSESSMENT AND PLAN:       1.  Hoda Brush is a 68-year-old woman with a history of ER-positive, MS-positive, HER2 positive breast cancer.  She is  from [facility], formally from [facility], and came to live in the [place] with [place].  The tumor is ER positive, NV positive and HER2 positive.  She had metastatic disease at the time of presentation with bone-only metastases by report.  She underwent neoadjuvant CAF, had a left mastectomy, left axillary lymph node dissection, radiation to the right hip, which included the right iliac region where she had metastatic disease.  She was initially on tamoxifen but then was switched to letrozole.  She continues on this and every 3-week trastuzumab.  She has had no evidence of disease progression for the last 3 years.  Markers are low and stable.  We have continued Herceptin every 3 weeks as well as daily letrozole. CT CAP shows stable pulmonary nodules.   2.  Hoda Brush continues to do well on a combination of trastuzumab and letrozole. She has no evidence of disease progression.   Her ejection fraction remains stable.   She continues to do well on trastuzumab and letrozole.  She has no evidence of disease progression.  She will continue with every 4-month CT of the chest, abdomen and pelvis.  Next to be performed 08/16/2023 and follow-up visit with Kassidy on 08/17/2023.  3.   The right maxillary osteonecrosis has healed and she is able to continue with Xgeva every 3 months. Continue with Xgeva every 3 months.  4.  Continue the letrozole and trastuzuamb.  Letrozole has been well-tolerated, as has trastuzuamb.  No significant joint stiffness.  5.  Echo. Stable EF. --Echo in  showed normal EF with slight decline 55-60%.  Last echo 07/23. Next due 10/23.   6.  Discussion of bone health and right maxillary osteonecrosis.  All healed.   Xgeva is being continued.   7. Follow up.  Continue Herceptin through the end of 2023 alternating providers every 6 weeks. CT CAP every 4 months. Xgeva every 3 months. CBC, CMP every 3 weeks and CA27.29 and CEA every 6 weeks.                   Again, thank you for allowing me to  participate in the care of your patient.        Sincerely,        Anya Cartwright PA-C

## 2023-08-17 NOTE — NURSING NOTE
"Oncology Rooming Note    August 17, 2023 8:07 AM   Hoda Brush is a 68 year old female who presents for:    Chief Complaint   Patient presents with    Port Draw     Labs drawn via port by RN in lab.  VS taken     Initial Vitals: /73   Pulse 75   Temp 98.3  F (36.8  C) (Oral)   Resp 16   Wt 82.3 kg (181 lb 8 oz)   SpO2 94%   BMI 32.98 kg/m   Estimated body mass index is 32.98 kg/m  as calculated from the following:    Height as of 7/11/23: 1.58 m (5' 2.21\").    Weight as of this encounter: 82.3 kg (181 lb 8 oz). Body surface area is 1.9 meters squared.  No Pain (0) Comment: Data Unavailable   No LMP recorded. Patient is postmenopausal.  Allergies reviewed: Yes  Medications reviewed: Yes    Medications: Medication refills not needed today.  Pharmacy name entered into [email or web address]: [id number] - [place], [place] - [address] AT [address] [place] [address]    Clinical concerns: none      Cherelle Burgess, EMT  8/17/2023              "

## 2023-08-17 NOTE — PATIENT INSTRUCTIONS
[facility] Triage and after hours / weekends / holidays:  [phone]    Please call the triage or after hours line if you experience a temperature greater than or equal to 100.4, shaking chills, have uncontrolled nausea, vomiting and/or diarrhea, dizziness, shortness of breath, chest pain, bleeding, unexplained bruising, or if you have any other new/concerning symptoms, questions or concerns.      If you are having any concerning symptoms or wish to speak to a provider before your next infusion visit, please call triage to notify them so we can adequately serve you.     If you need a refill on a narcotic prescription or other medication, please call before your infusion appointment.                August 2023 Sunday Monday Tuesday Wednesday Thursday Friday Saturday             1     2     3     4     5       6     7     8     9     10     11     12       13     14     15     16    CT CHEST/ABDOMEN/PELVIS W  10:10 AM   (20 min.)   EICCT1   [facility] 17    LAB CENTRAL   7:45 AM   (15 min.)   [facility] LAB DRAW   [facility]    RETURN CCSL   8:00 AM   (45 min.)   Anya Cartwright PA-C   [facility]    ONC INFUSION 1 HR (60 MIN)   9:00 AM   (60 min.)   [facility] ONC INFUSION NURSE   [facility] 18     19       20     21     22     23     24     25     26       27     28     29     30     31                           September 2023 Sunday Monday Tuesday Wednesday Thursday Friday Saturday                            1     2       3     4     5     6     7    LAB CENTRAL  10:30 AM   (15 min.)   [facility] LAB DRAW   [facility]    ONC INFUSION 1 HR (60 MIN)  11:00 AM   (60 min.)   [facility] ONC INFUSION NURSE   [facility] 8     9       10     11     12     13     14     15     16       17     18     19     20     21     22     23       24     25     26      27     28    LAB CENTRAL  10:00 AM   (15 min.)   [facility] LAB DRAW   [facility]    RETURN CCSL  10:15 AM   (45 min.)   Kassidy Robles APRN CNP   [facility]    ONC INFUSION 1 HR (60 MIN)  11:30 AM   (60 min.)    ONC INFUSION NURSE   [facility] 29     30                     Lab Results:  Recent Results (from the past 12 hour(s))   COMPREHENSIVE METABOLIC PANEL    Collection Time: 08/17/23  8:00 AM   Result Value Ref Range    Sodium 140 136 - 145 mmol/L    Potassium 4.2 3.4 - 5.3 mmol/L    Chloride 106 98 - 107 mmol/L    Carbon Dioxide (CO2) 28 22 - 29 mmol/L    Anion Gap 6 (L) 7 - 15 mmol/L    Urea Nitrogen 15.1 8.0 - 23.0 mg/dL    Creatinine 0.85 0.51 - 0.95 mg/dL    Calcium 9.3 8.8 - 10.2 mg/dL    Glucose 103 (H) 70 - 99 mg/dL    Alkaline Phosphatase 51 35 - 104 U/L    AST 36 0 - 45 U/L    ALT 41 0 - 50 U/L    Protein Total 7.3 6.4 - 8.3 g/dL    Albumin 4.2 3.5 - 5.2 g/dL    Bilirubin Total 0.3 <=1.2 mg/dL    GFR Estimate 75 >60 mL/min/1.73m2   CBC with platelets and differential    Collection Time: 08/17/23  8:00 AM   Result Value Ref Range    WBC Count 6.5 4.0 - 11.0 10e3/uL    RBC Count 4.26 3.80 - 5.20 10e6/uL    Hemoglobin 12.6 11.7 - 15.7 g/dL    Hematocrit 39.5 35.0 - 47.0 %    MCV 93 78 - 100 fL    MCH 29.6 26.5 - 33.0 pg    MCHC 31.9 31.5 - 36.5 g/dL    RDW 13.3 10.0 - 15.0 %    Platelet Count 236 150 - 450 10e3/uL    % Neutrophils 49 %    % Lymphocytes 40 %    % Monocytes 7 %    % Eosinophils 3 %    % Basophils 1 %    % Immature Granulocytes 0 %    NRBCs per 100 WBC 0 <1 /100    Absolute Neutrophils 3.2 1.6 - 8.3 10e3/uL    Absolute Lymphocytes 2.6 0.8 - 5.3 10e3/uL    Absolute Monocytes 0.5 0.0 - 1.3 10e3/uL    Absolute Eosinophils 0.2 0.0 - 0.7 10e3/uL    Absolute Basophils 0.0 0.0 - 0.2 10e3/uL    Absolute Immature Granulocytes 0.0 <=0.4 10e3/uL    Absolute NRBCs 0.0 10e3/uL

## 2023-08-19 LAB — CANCER AG27-29 SERPL-ACNC: <9 U/ML

## 2023-09-05 RX ORDER — EPINEPHRINE 0.3 MG/.3ML
0.3 INJECTION SUBCUTANEOUS EVERY 5 MIN PRN
Status: CANCELLED | OUTPATIENT
Start: 2023-09-07

## 2023-09-05 RX ORDER — ALBUTEROL SULFATE 90 UG/1
1-2 AEROSOL, METERED RESPIRATORY (INHALATION)
Status: CANCELLED
Start: 2023-09-07

## 2023-09-05 RX ORDER — SODIUM CHLORIDE 9 MG/ML
1000 INJECTION, SOLUTION INTRAVENOUS CONTINUOUS PRN
Status: CANCELLED
Start: 2023-09-28

## 2023-09-05 RX ORDER — LORAZEPAM 2 MG/ML
0.5 INJECTION INTRAMUSCULAR EVERY 4 HOURS PRN
Status: CANCELLED
Start: 2023-09-28

## 2023-09-05 RX ORDER — DIPHENHYDRAMINE HCL 50 MG
50 CAPSULE ORAL ONCE
Status: CANCELLED
Start: 2023-09-28

## 2023-09-05 RX ORDER — ACETAMINOPHEN 325 MG/1
650 TABLET ORAL
Status: CANCELLED | OUTPATIENT
Start: 2023-09-28

## 2023-09-05 RX ORDER — EPINEPHRINE 1 MG/ML
0.3 INJECTION, SOLUTION, CONCENTRATE INTRAVENOUS EVERY 5 MIN PRN
Status: CANCELLED | OUTPATIENT
Start: 2023-09-28

## 2023-09-05 RX ORDER — ALBUTEROL SULFATE 0.83 MG/ML
2.5 SOLUTION RESPIRATORY (INHALATION)
Status: CANCELLED | OUTPATIENT
Start: 2023-09-07

## 2023-09-05 RX ORDER — ACETAMINOPHEN 325 MG/1
650 TABLET ORAL
Status: CANCELLED | OUTPATIENT
Start: 2023-09-07

## 2023-09-05 RX ORDER — ALBUTEROL SULFATE 90 UG/1
1-2 AEROSOL, METERED RESPIRATORY (INHALATION)
Status: CANCELLED
Start: 2023-09-28

## 2023-09-05 RX ORDER — SODIUM CHLORIDE 9 MG/ML
1000 INJECTION, SOLUTION INTRAVENOUS CONTINUOUS PRN
Status: CANCELLED
Start: 2023-09-07

## 2023-09-05 RX ORDER — HEPARIN SODIUM (PORCINE) LOCK FLUSH IV SOLN 100 UNIT/ML 100 UNIT/ML
5 SOLUTION INTRAVENOUS EVERY 8 HOURS
Status: CANCELLED | OUTPATIENT
Start: 2023-09-28

## 2023-09-05 RX ORDER — EPINEPHRINE 0.3 MG/.3ML
0.3 INJECTION SUBCUTANEOUS EVERY 5 MIN PRN
Status: CANCELLED | OUTPATIENT
Start: 2023-09-28

## 2023-09-05 RX ORDER — ALBUTEROL SULFATE 0.83 MG/ML
2.5 SOLUTION RESPIRATORY (INHALATION)
Status: CANCELLED | OUTPATIENT
Start: 2023-09-28

## 2023-09-05 RX ORDER — EPINEPHRINE 1 MG/ML
0.3 INJECTION, SOLUTION, CONCENTRATE INTRAVENOUS EVERY 5 MIN PRN
Status: CANCELLED | OUTPATIENT
Start: 2023-09-07

## 2023-09-05 RX ORDER — HEPARIN SODIUM (PORCINE) LOCK FLUSH IV SOLN 100 UNIT/ML 100 UNIT/ML
5 SOLUTION INTRAVENOUS EVERY 8 HOURS
Status: CANCELLED | OUTPATIENT
Start: 2023-09-07

## 2023-09-05 RX ORDER — DIPHENHYDRAMINE HCL 50 MG
50 CAPSULE ORAL ONCE
Status: CANCELLED
Start: 2023-09-07

## 2023-09-05 RX ORDER — DIPHENHYDRAMINE HYDROCHLORIDE 50 MG/ML
50 INJECTION INTRAMUSCULAR; INTRAVENOUS
Status: CANCELLED
Start: 2023-09-07

## 2023-09-05 RX ORDER — DIPHENHYDRAMINE HYDROCHLORIDE 50 MG/ML
50 INJECTION INTRAMUSCULAR; INTRAVENOUS
Status: CANCELLED
Start: 2023-09-28

## 2023-09-05 RX ORDER — LORAZEPAM 2 MG/ML
0.5 INJECTION INTRAMUSCULAR EVERY 4 HOURS PRN
Status: CANCELLED
Start: 2023-09-07

## 2023-09-07 ENCOUNTER — INFUSION THERAPY VISIT (OUTPATIENT)
Dept: ONCOLOGY | Facility: CLINIC | Age: 68
End: 2023-09-07
Attending: NURSE PRACTITIONER
Payer: COMMERCIAL

## 2023-09-07 ENCOUNTER — APPOINTMENT (OUTPATIENT)
Dept: LAB | Facility: CLINIC | Age: 68
End: 2023-09-07
Attending: INTERNAL MEDICINE
Payer: COMMERCIAL

## 2023-09-07 VITALS
BODY MASS INDEX: 33.36 KG/M2 | SYSTOLIC BLOOD PRESSURE: 134 MMHG | WEIGHT: 183.6 LBS | DIASTOLIC BLOOD PRESSURE: 77 MMHG | TEMPERATURE: 98 F | OXYGEN SATURATION: 96 % | HEART RATE: 82 BPM | RESPIRATION RATE: 18 BRPM

## 2023-09-07 DIAGNOSIS — C50.912 MALIGNANT NEOPLASM OF LEFT FEMALE BREAST, UNSPECIFIED ESTROGEN RECEPTOR STATUS, UNSPECIFIED SITE OF BREAST (H): Primary | ICD-10-CM

## 2023-09-07 LAB
ALBUMIN SERPL BCG-MCNC: 3.9 G/DL (ref 3.5–5.2)
ALP SERPL-CCNC: 47 U/L (ref 35–104)
ALT SERPL W P-5'-P-CCNC: 39 U/L (ref 0–50)
ANION GAP SERPL CALCULATED.3IONS-SCNC: 13 MMOL/L (ref 7–15)
AST SERPL W P-5'-P-CCNC: 48 U/L (ref 0–45)
BASOPHILS # BLD AUTO: 0 10E3/UL (ref 0–0.2)
BASOPHILS NFR BLD AUTO: 1 %
BILIRUB SERPL-MCNC: 0.3 MG/DL
BUN SERPL-MCNC: 13.2 MG/DL (ref 8–23)
CALCIUM SERPL-MCNC: 9.1 MG/DL (ref 8.8–10.2)
CEA SERPL-MCNC: 1 NG/ML
CHLORIDE SERPL-SCNC: 106 MMOL/L (ref 98–107)
CREAT SERPL-MCNC: 0.78 MG/DL (ref 0.51–0.95)
DEPRECATED HCO3 PLAS-SCNC: 20 MMOL/L (ref 22–29)
EGFRCR SERPLBLD CKD-EPI 2021: 83 ML/MIN/1.73M2
EOSINOPHIL # BLD AUTO: 0.2 10E3/UL (ref 0–0.7)
EOSINOPHIL NFR BLD AUTO: 3 %
ERYTHROCYTE [DISTWIDTH] IN BLOOD BY AUTOMATED COUNT: 13.4 % (ref 10–15)
GLUCOSE SERPL-MCNC: 91 MG/DL (ref 70–99)
HCT VFR BLD AUTO: 39.3 % (ref 35–47)
HGB BLD-MCNC: 12.6 G/DL (ref 11.7–15.7)
IMM GRANULOCYTES # BLD: 0 10E3/UL
IMM GRANULOCYTES NFR BLD: 0 %
LYMPHOCYTES # BLD AUTO: 2.6 10E3/UL (ref 0.8–5.3)
LYMPHOCYTES NFR BLD AUTO: 40 %
MCH RBC QN AUTO: 29.7 PG (ref 26.5–33)
MCHC RBC AUTO-ENTMCNC: 32.1 G/DL (ref 31.5–36.5)
MCV RBC AUTO: 93 FL (ref 78–100)
MONOCYTES # BLD AUTO: 0.4 10E3/UL (ref 0–1.3)
MONOCYTES NFR BLD AUTO: 6 %
NEUTROPHILS # BLD AUTO: 3.3 10E3/UL (ref 1.6–8.3)
NEUTROPHILS NFR BLD AUTO: 50 %
NRBC # BLD AUTO: 0 10E3/UL
NRBC BLD AUTO-RTO: 0 /100
PLATELET # BLD AUTO: 170 10E3/UL (ref 150–450)
POTASSIUM SERPL-SCNC: 4.2 MMOL/L (ref 3.4–5.3)
PROT SERPL-MCNC: 7.3 G/DL (ref 6.4–8.3)
RBC # BLD AUTO: 4.24 10E6/UL (ref 3.8–5.2)
SODIUM SERPL-SCNC: 139 MMOL/L (ref 136–145)
WBC # BLD AUTO: 6.5 10E3/UL (ref 4–11)

## 2023-09-07 PROCEDURE — 85025 COMPLETE CBC W/AUTO DIFF WBC: CPT | Performed by: INTERNAL MEDICINE

## 2023-09-07 PROCEDURE — 86300 IMMUNOASSAY TUMOR CA 15-3: CPT | Performed by: INTERNAL MEDICINE

## 2023-09-07 PROCEDURE — 96413 CHEMO IV INFUSION 1 HR: CPT

## 2023-09-07 PROCEDURE — 82378 CARCINOEMBRYONIC ANTIGEN: CPT | Performed by: INTERNAL MEDICINE

## 2023-09-07 PROCEDURE — 258N000003 HC RX IP 258 OP 636: Performed by: INTERNAL MEDICINE

## 2023-09-07 PROCEDURE — 250N000011 HC RX IP 250 OP 636: Mod: JZ | Performed by: NURSE PRACTITIONER

## 2023-09-07 PROCEDURE — 250N000011 HC RX IP 250 OP 636: Mod: JZ | Performed by: INTERNAL MEDICINE

## 2023-09-07 PROCEDURE — 80053 COMPREHEN METABOLIC PANEL: CPT | Performed by: INTERNAL MEDICINE

## 2023-09-07 PROCEDURE — 36591 DRAW BLOOD OFF VENOUS DEVICE: CPT | Performed by: INTERNAL MEDICINE

## 2023-09-07 RX ORDER — HEPARIN SODIUM (PORCINE) LOCK FLUSH IV SOLN 100 UNIT/ML 100 UNIT/ML
5 SOLUTION INTRAVENOUS ONCE
Status: COMPLETED | OUTPATIENT
Start: 2023-09-07 | End: 2023-09-07

## 2023-09-07 RX ORDER — HEPARIN SODIUM (PORCINE) LOCK FLUSH IV SOLN 100 UNIT/ML 100 UNIT/ML
5 SOLUTION INTRAVENOUS EVERY 8 HOURS
Status: DISCONTINUED | OUTPATIENT
Start: 2023-09-07 | End: 2023-09-07 | Stop reason: HOSPADM

## 2023-09-07 RX ADMIN — Medication 5 ML: at 12:12

## 2023-09-07 RX ADMIN — SODIUM CHLORIDE 250 ML: 9 INJECTION, SOLUTION INTRAVENOUS at 11:45

## 2023-09-07 RX ADMIN — Medication 5 ML: at 10:57

## 2023-09-07 RX ADMIN — TRASTUZUMAB 450 MG: 150 INJECTION, POWDER, LYOPHILIZED, FOR SOLUTION INTRAVENOUS at 11:45

## 2023-09-07 ASSESSMENT — PAIN SCALES - GENERAL: PAINLEVEL: NO PAIN (0)

## 2023-09-07 NOTE — PROGRESS NOTES
Infusion Nursing Note:  Hoda Brush presents today for Cycle 103 Day 1 Trastuzumab.    Patient spoke with provider today: No    Treatment Conditions:  Lab Results   Component Value Date    HGB 12.6 09/07/2023    WBC 6.5 09/07/2023    ANEU 3.1 06/22/2021    ANEUTAUTO 3.3 09/07/2023     09/07/2023        Lab Results   Component Value Date     09/07/2023    POTASSIUM 4.2 09/07/2023    CR 0.78 09/07/2023    TAYLER 9.1 09/07/2023    BILITOTAL 0.3 09/07/2023    ALBUMIN 3.9 09/07/2023    ALT 39 09/07/2023    AST 48 (H) 09/07/2023       Results reviewed, labs MET treatment parameters, ok to proceed with treatment.        Note: No concerns at infusion visit today. Denies fever/chills, SOB or cough.    ECHO/MUGA completed 7/11/23  EF 55%.     Intravenous Access:  Implanted Port.    Post Infusion Assessment:  Patient tolerated infusion without incident.  Blood return noted pre and post infusion.  Access discontinued per protocol.    Discharge Plan:   Patient declined prescription refills.  Discharge instructions reviewed with: Patient.  Patient and/or family verbalized understanding of discharge instructions and all questions answered.  Copy of AVS reviewed with patient and/or family.  Patient will return 9/28/23 for next appointment.  AVS to patient via [employer].  Patient will return 9/28/23 for next appointment.   Patient discharged in stable condition accompanied by: self.  Departure Mode: Ambulatory.    Linda Navarro RN

## 2023-09-07 NOTE — NURSING NOTE
"Chief Complaint   Patient presents with    Chemotherapy     Cycle 103 Trastuzumab    Port Draw     Labs drawn via port by RN. Port accessed with 20g 3/4\" gripper needle. Vitals taken.  Flushed with saline and heparin. Pt tolerated well. Patient checked into next appointment.       Marycarmen Magana RN    "

## 2023-09-09 LAB — CANCER AG27-29 SERPL-ACNC: <9 U/ML

## 2023-09-27 NOTE — PROGRESS NOTES
Sep 28, 2023    ONCOLOGY RETURN VISIT:     Hoda is a patient from [facility] and is seen here for continuation of care for her metastatic ER+HER2- breast cancer.  She is a refugee from [facility] and was initially seen in clinic with her daughter.  The only data we have from [facility] is an English translation of her records.       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  On 02/11/2014, she was seen by an oncologist.  The clinical staging of T4b N2 M1 was noted.   Her breast cancer was on the left side. There was no right breast cancer, confirmed by the patient and her daughter, correcting a possible error in the translated records.  ER was positive in 100% of the cells, NE at 14% and HER2 was 3+ positive.  It appears that this histopathologic information is on the mastectomy specimen. She underwent an MRI for staging of presumptive bone metastases which was performed 03/02/2014.  There were skeletal metastases in the thoracic vertebrae at 12, L1, L3, L5 and S1 vertebral body, ranging in size from 0.7-3.0 cm in size.  Ischial and right femur were also involved, as well as a large iliac mass measuring about 15 cm in the uterus. 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   Radiation Oncology consultation was performed 03/11/2014, and she was given radiation in 1 dose of 6 Gy to the right iliac lesion.        With the initial diagnosis, she initiated treatment with 6 cycles of CAF neoadjuvant chemotherapy 02/14, 07/07, 03/28, 04/18/14, 05/08 and 05/29/14. She also received monthly zoledronic acid.  She then underwent a modified left mastectomy of Palacios type and left lymphadenoectomy on 06/27/2014.   Pathologic examination showed number at [facility] was [id number] showed infiltrative grade 2 ductal cancer with 6 level 1 metastatic lymph nodes and 3 level 2 metastatic lymph nodes.  The differentiation was intermediate.  The tumor was  ER positive 70% of the cells, TN positive in 40% of the cells and HER2 was 3+ by immunohistochemistry and the Ki-67 labeling index was 20%. Her staging after surgery was stage IV, pT4b N2 M1.  I don't see a biopsy of the skeletal metastases.  She then had continuation with chemotherapy with 4 cycles of Herceptin and taxane with monthly zoledronic acid.  Tamoxifen was initiated.  She then had two years of Herceptin and a decision was made not to continue further Herceptin.  She continued on zoledronic acid every 3 months. She was clinically stable. She then moved to the [place] new refugee from [facility].  She has now been changed to letrozole.  Denosumab was held for new diagnosis of osteonecrosis.     History of cholecystectomy 2020.      TREATMENT HISTORY:  A. Initial diagnosis with metastatic breast cancer in [facility].  Neoadjuvant CAF x 6.   B. Left mastectomy. Left axillary node dissection.  C. Radiation in 1 dose to R iliac region.  C. Herceptin for 2 years taxane for a prescribed course then stopped, monthly zoledronic acid.  She had 2 years of Herceptin with tamoxifen added after chemotherapy.  D. Tamoxifen alone and zoledronic acid every 3 months starting 2014.  Letrozole was started   E[place]  We restarted Herceptin every 3 weeks and continued tamoxifen. Bone targeted agent changed to denosumab every 9 weeks. Zometa discontinued 2017.  Tamoxifen was changed to letrozole August 2019.    F. Xgeva discontinued 12-17-19 because of dental issues.   G. J+J vaccine March, 2021.  H. Was on Zometa once May 11.  Reaction with eye lid swelling. Discontinued Zometa.  H. Restart Xgeva 6-22-21.  Continuing the trastuzumab and letrozole.  Both tolerated well.       INTERVAL HISTORY:  Hoda returns to clinic today and is feeling well. She has no new concerns today.  She reports that the right posterior hip discomfort that she discussed at her last visit is better.  She still sometimes experiences some discomfort  when she stands in place for a long time, and if she changes her position, the discomfort resolves.  She continues to stay active, walking daily.  She does have a treadmill at home for walking in the winter.  She reports that her energy is good.  She denies fevers or chills or signs of systemic illness.  She denies headache, dizziness or vision changes.  She denies cough, chest pain, or shortness of breath at rest.  She denies nausea, vomiting, abdominal pain or bowel changes.  She continues on trastuzumab, letrozole and Xgeva, and is tolerating these well.    ROS: 12 point ROS neg other than the symptoms noted above in the HPI.    PHYSICAL EXAMINATION:    /68 (BP Location: Right arm, Patient Position: Sitting, Cuff Size: Adult Regular)   Pulse 77   Temp 98.2  F (36.8  C) (Oral)   Resp 16   Wt 82.9 kg (182 lb 12.8 oz)   SpO2 95%   BMI 33.21 kg/m    General: No acute distress  HEENT: Sclera anicteric.   Lymph: No cervical or supraclavicular lymphadenopathy noted.  Heart: Regular, rate, and rhythm.  Lungs: Clear to ascultation bilaterally.  Abdomen: Positive bowel sounds. Soft, non-distended, non-tender.   Extremities: No lower extremity edema  Neuro: Cranial nerves grossly intact  Rash: None noted on visible skin.     LABORATORY DATA:   Most Recent 3 CBC's:  Recent Labs   Lab Test 09/28/23  0955 09/07/23  1054 08/17/23  0800   WBC 6.2 6.5 6.5   HGB 12.4 12.6 12.6   MCV 94 93 93    170 236   ANEUTAUTO 3.2 3.3 3.2     Most Recent 3 BMP's:  Recent Labs   Lab Test 09/28/23  0955 09/07/23  1054 08/17/23  0800    139 140   POTASSIUM 4.2 4.2 4.2   CHLORIDE 106 106 106   CO2 28 20* 28   BUN 11.7 13.2 15.1   CR 0.80 0.78 0.85   ANIONGAP 8 13 6*   TAYLER 8.7* 9.1 9.3   GLC 96 91 103*   PROTTOTAL 7.5 7.3 7.3   ALBUMIN 4.1 3.9 4.2    Most Recent 3 LFT's:  Recent Labs   Lab Test 09/28/23  0955 09/07/23  1054 08/17/23  0800   AST 51* 48* 36   ALT 53* 39 41   ALKPHOS 48 47 51   BILITOTAL 0.3 0.3 0.3    Most  Recent 2 TSH and T4:  Recent Labs   Lab Test 07/06/23  1216 12/09/21  1438   TSH 0.77 1.01     IMAGING:  No new imaging to review today.    I reviewed the above labs and imaging today.     ASSESSMENT AND PLAN:       Hoda Brush is a 68-year-old woman with a history of metastatic ER-positive, AL-positive, HER2 positive breast cancer to the bones.  She is from [facility], formally from [facility], and came to live in the [place] with [place].   She had metastatic disease at the time of presentation with bone-only metastases by report.  She underwent neoadjuvant CAF, had a left mastectomy, left axillary lymph node dissection, radiation to the right hip, which included the right iliac region where she had metastatic disease.  She was initially on tamoxifen but then was switched to letrozole.  She continues on this and every 3-week trastuzumab.  She has had no evidence of disease progression for the last 3 years.  Markers are low and stable, pending today.  We have continued Herceptin every 3 weeks as well as daily letrozole. CT CAP shows stable pulmonary nodules and stable sclerotic bone metastasis.   - Labs and clinically appropriate to continue herceptin and letrozole.   - 07/2023 echo overall stable with EF of 55%, unchanged from 04/2023 study. Next due and scheduled in October 2023.   - Continue CT CAP every 4 months.     Bone Metastasis   - Hx of R maxillary osteonecrosis has healed. Cleared to restart Xgeva. Will continue every 3 months and administered on 8/17/2023. Per patient recent dental exam 08/16/23 with no concerns.   - R hip discomfort reported in her August visit improved overall.  She now only experiences this as mild discomfort when standing for long periods.  Resolves with position changes.  She will call with any new concerns.      ALT/AST elevation  - AST elevation at last lab draw which persists today and ALT also mildly elevated.   - Per chart review she has had intermittent elevations  over the last several years.   - Most recent CT 8/16/2023 with no concerning findings; does have hepatic steatosis and is s/p cholecystectomy in 2020.   - No dose adjustments needed at this time.  - Medications reviewed and discussed with patient.  She has not taken anything new.   - Continue to closely monitor.    Follow up   - Continue herceptin and labs every three weeks with provider visits every 6 weeks. Echo scheduled for 10/19    KATHI Valencia, CNP  [facility]  [address] [place] [address] [phone]

## 2023-09-28 ENCOUNTER — INFUSION THERAPY VISIT (OUTPATIENT)
Dept: ONCOLOGY | Facility: CLINIC | Age: 68
End: 2023-09-28
Attending: NURSE PRACTITIONER
Payer: COMMERCIAL

## 2023-09-28 ENCOUNTER — APPOINTMENT (OUTPATIENT)
Dept: LAB | Facility: CLINIC | Age: 68
End: 2023-09-28
Attending: NURSE PRACTITIONER
Payer: COMMERCIAL

## 2023-09-28 VITALS
RESPIRATION RATE: 16 BRPM | WEIGHT: 182.8 LBS | TEMPERATURE: 98.2 F | OXYGEN SATURATION: 95 % | BODY MASS INDEX: 33.21 KG/M2 | HEART RATE: 77 BPM | SYSTOLIC BLOOD PRESSURE: 127 MMHG | DIASTOLIC BLOOD PRESSURE: 68 MMHG

## 2023-09-28 DIAGNOSIS — C50.912 MALIGNANT NEOPLASM OF LEFT FEMALE BREAST, UNSPECIFIED ESTROGEN RECEPTOR STATUS, UNSPECIFIED SITE OF BREAST (H): Primary | ICD-10-CM

## 2023-09-28 LAB
ALBUMIN SERPL BCG-MCNC: 4.1 G/DL (ref 3.5–5.2)
ALP SERPL-CCNC: 48 U/L (ref 35–104)
ALT SERPL W P-5'-P-CCNC: 53 U/L (ref 0–50)
ANION GAP SERPL CALCULATED.3IONS-SCNC: 8 MMOL/L (ref 7–15)
AST SERPL W P-5'-P-CCNC: 51 U/L (ref 0–45)
BASOPHILS # BLD AUTO: 0 10E3/UL (ref 0–0.2)
BASOPHILS NFR BLD AUTO: 1 %
BILIRUB SERPL-MCNC: 0.3 MG/DL
BUN SERPL-MCNC: 11.7 MG/DL (ref 8–23)
CALCIUM SERPL-MCNC: 8.7 MG/DL (ref 8.8–10.2)
CEA SERPL-MCNC: 0.8 NG/ML
CHLORIDE SERPL-SCNC: 106 MMOL/L (ref 98–107)
CREAT SERPL-MCNC: 0.8 MG/DL (ref 0.51–0.95)
DEPRECATED HCO3 PLAS-SCNC: 28 MMOL/L (ref 22–29)
EGFRCR SERPLBLD CKD-EPI 2021: 80 ML/MIN/1.73M2
EOSINOPHIL # BLD AUTO: 0.2 10E3/UL (ref 0–0.7)
EOSINOPHIL NFR BLD AUTO: 3 %
ERYTHROCYTE [DISTWIDTH] IN BLOOD BY AUTOMATED COUNT: 13.5 % (ref 10–15)
GLUCOSE SERPL-MCNC: 96 MG/DL (ref 70–99)
HCT VFR BLD AUTO: 39.5 % (ref 35–47)
HGB BLD-MCNC: 12.4 G/DL (ref 11.7–15.7)
IMM GRANULOCYTES # BLD: 0 10E3/UL
IMM GRANULOCYTES NFR BLD: 0 %
LYMPHOCYTES # BLD AUTO: 2.4 10E3/UL (ref 0.8–5.3)
LYMPHOCYTES NFR BLD AUTO: 39 %
MCH RBC QN AUTO: 29.4 PG (ref 26.5–33)
MCHC RBC AUTO-ENTMCNC: 31.4 G/DL (ref 31.5–36.5)
MCV RBC AUTO: 94 FL (ref 78–100)
MONOCYTES # BLD AUTO: 0.4 10E3/UL (ref 0–1.3)
MONOCYTES NFR BLD AUTO: 6 %
NEUTROPHILS # BLD AUTO: 3.2 10E3/UL (ref 1.6–8.3)
NEUTROPHILS NFR BLD AUTO: 51 %
NRBC # BLD AUTO: 0 10E3/UL
NRBC BLD AUTO-RTO: 0 /100
PLATELET # BLD AUTO: 206 10E3/UL (ref 150–450)
POTASSIUM SERPL-SCNC: 4.2 MMOL/L (ref 3.4–5.3)
PROT SERPL-MCNC: 7.5 G/DL (ref 6.4–8.3)
RBC # BLD AUTO: 4.22 10E6/UL (ref 3.8–5.2)
SODIUM SERPL-SCNC: 142 MMOL/L (ref 135–145)
WBC # BLD AUTO: 6.2 10E3/UL (ref 4–11)

## 2023-09-28 PROCEDURE — 36591 DRAW BLOOD OFF VENOUS DEVICE: CPT | Performed by: INTERNAL MEDICINE

## 2023-09-28 PROCEDURE — 250N000011 HC RX IP 250 OP 636: Mod: JZ | Performed by: NURSE PRACTITIONER

## 2023-09-28 PROCEDURE — 250N000011 HC RX IP 250 OP 636: Mod: JZ | Performed by: INTERNAL MEDICINE

## 2023-09-28 PROCEDURE — 258N000003 HC RX IP 258 OP 636: Performed by: INTERNAL MEDICINE

## 2023-09-28 PROCEDURE — 96413 CHEMO IV INFUSION 1 HR: CPT

## 2023-09-28 PROCEDURE — 80053 COMPREHEN METABOLIC PANEL: CPT | Performed by: INTERNAL MEDICINE

## 2023-09-28 PROCEDURE — 82378 CARCINOEMBRYONIC ANTIGEN: CPT | Performed by: INTERNAL MEDICINE

## 2023-09-28 PROCEDURE — 99214 OFFICE O/P EST MOD 30 MIN: CPT | Performed by: NURSE PRACTITIONER

## 2023-09-28 PROCEDURE — 86300 IMMUNOASSAY TUMOR CA 15-3: CPT | Performed by: INTERNAL MEDICINE

## 2023-09-28 PROCEDURE — 85041 AUTOMATED RBC COUNT: CPT | Performed by: INTERNAL MEDICINE

## 2023-09-28 PROCEDURE — 85014 HEMATOCRIT: CPT | Performed by: INTERNAL MEDICINE

## 2023-09-28 PROCEDURE — G0463 HOSPITAL OUTPT CLINIC VISIT: HCPCS | Mod: 25 | Performed by: NURSE PRACTITIONER

## 2023-09-28 RX ORDER — HEPARIN SODIUM (PORCINE) LOCK FLUSH IV SOLN 100 UNIT/ML 100 UNIT/ML
5 SOLUTION INTRAVENOUS ONCE
Status: COMPLETED | OUTPATIENT
Start: 2023-09-28 | End: 2023-09-28

## 2023-09-28 RX ORDER — HEPARIN SODIUM (PORCINE) LOCK FLUSH IV SOLN 100 UNIT/ML 100 UNIT/ML
5 SOLUTION INTRAVENOUS EVERY 8 HOURS
Status: DISCONTINUED | OUTPATIENT
Start: 2023-09-28 | End: 2023-09-28 | Stop reason: HOSPADM

## 2023-09-28 RX ADMIN — Medication 5 ML: at 09:46

## 2023-09-28 RX ADMIN — TRASTUZUMAB 450 MG: 150 INJECTION, POWDER, LYOPHILIZED, FOR SOLUTION INTRAVENOUS at 11:25

## 2023-09-28 RX ADMIN — Medication 5 ML: at 11:57

## 2023-09-28 ASSESSMENT — PAIN SCALES - GENERAL: PAINLEVEL: NO PAIN (0)

## 2023-09-28 NOTE — NURSING NOTE
"Oncology Rooming Note    September 28, 2023 10:12 AM   Hoda Brush is a 68 year old female who presents for:    Chief Complaint   Patient presents with    Port Draw     Labs drawn via port a cath by RN, HUSSAIN done    Oncology Clinic Visit     Breast CA     Initial Vitals: /68 (BP Location: Right arm, Patient Position: Sitting, Cuff Size: Adult Regular)   Pulse 77   Temp 98.2  F (36.8  C) (Oral)   Resp 16   Wt 82.9 kg (182 lb 12.8 oz)   SpO2 95%   BMI 33.21 kg/m   Estimated body mass index is 33.21 kg/m  as calculated from the following:    Height as of 7/11/23: 1.58 m (5' 2.21\").    Weight as of this encounter: 82.9 kg (182 lb 12.8 oz). Body surface area is 1.91 meters squared.  No Pain (0) Comment: Data Unavailable   No LMP recorded. Patient is postmenopausal.  Allergies reviewed: Yes  Medications reviewed: Yes    Medications: Medication refills not needed today.  Pharmacy name entered into [employer]: [id number] - [place], [place] - [address]    Clinical concerns:        Marycarmen Moe              "

## 2023-09-28 NOTE — PROGRESS NOTES
Infusion Nursing Note:  Hoda Brush presents today for Cycle 104 Day 1 Trastuzumab (HERCEPTIN).    Patient seen by provider today: Yes: Kassidy Robles, CNP   present during visit today: Not Applicable.    Note: Hoda comes into the clinic today doing well overall and has no concerns to offer following her provider visit. She has no pain and denies s/s of infection..      Intravenous Access:  Implanted Port.    Treatment Conditions:  Lab Results   Component Value Date    HGB 12.4 09/28/2023    WBC 6.2 09/28/2023    ANEU 3.1 06/22/2021    ANEUTAUTO 3.2 09/28/2023     09/28/2023        Lab Results   Component Value Date     09/28/2023    POTASSIUM 4.2 09/28/2023    CR 0.80 09/28/2023    TAYLER 8.7 (L) 09/28/2023    BILITOTAL 0.3 09/28/2023    ALBUMIN 4.1 09/28/2023    ALT 53 (H) 09/28/2023    AST 51 (H) 09/28/2023       Results reviewed, labs MET treatment parameters, ok to proceed with treatment.  ECHO/MUGA completed 7/11/23 EF 55%    Post Infusion Assessment:  Patient tolerated infusion without incident.  Blood return noted pre and post infusion.  Site patent and intact, free from redness, edema or discomfort.  No evidence of extravasations.  Access discontinued per protocol.       Discharge Plan:   Patient declined prescription refills.  Discharge instructions reviewed with: Patient.  Patient and/or family verbalized understanding of discharge instructions and all questions answered.  AVS to patient via [employer].  Patient will return 10/19 for next appointment.   Patient discharged in stable condition accompanied by: self.  Departure Mode: Ambulatory.      Delia Sears RN

## 2023-09-28 NOTE — PATIENT INSTRUCTIONS
[facility] Triage and after hours / weekends / holidays:  [phone]    Please call the triage or after hours line if you experience a temperature greater than or equal to 100.4, shaking chills, have uncontrolled nausea, vomiting and/or diarrhea, dizziness, shortness of breath, chest pain, bleeding, unexplained bruising, or if you have any other new/concerning symptoms, questions or concerns.      If you are having any concerning symptoms or wish to speak to a provider before your next infusion visit, please call triage to notify them so we can adequately serve you.     If you need a refill on a narcotic prescription or other medication, please call before your infusion appointment.

## 2023-09-28 NOTE — NURSING NOTE
Chief Complaint   Patient presents with    Port Draw     Labs drawn via port a cath by RN, VS done     Port accessed with gripper needle by RN, labs collected, line flushed with saline and heparin.  Vitals taken. Pt checked in for appointment(s).

## 2023-09-28 NOTE — LETTER
9/28/2023         RE: Hoda Brush  [address]  [place] [facility] [address]        Dear Colleague,    Thank you for referring your patient, Hoda Brush, to the [facility]. Please see a copy of my visit note below.    Sep 28, 2023    ONCOLOGY RETURN VISIT:     Hoda is a patient from [facility] and is seen here for continuation of care for her metastatic ER+HER2- breast cancer.  She is a refugee from [place] and was initially seen in clinic with her daughter.  The only data we have from [facility] is an English translation of her records.       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  On 02/11/2014, she was seen by an oncologist.  The clinical staging of T4b N2 M1 was noted.   Her breast cancer was on the left side. There was no right breast cancer, confirmed by the patient and her daughter, correcting a possible error in the translated records.  ER was positive in 100% of the cells, OH at 14% and HER2 was 3+ positive.  It appears that this histopathologic information is on the mastectomy specimen. She underwent an MRI for staging of presumptive bone metastases which was performed 03/02/2014.  There were skeletal metastases in the thoracic vertebrae at 12, L1, L3, L5 and S1 vertebral body, ranging in size from 0.7-3.0 cm in size.  Ischial and right femur were also involved, as well as a large iliac mass measuring about 15 cm in the uterus. 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   Radiation Oncology consultation was performed 03/11/2014, and she was given radiation in 1 dose of 6 Gy to the right iliac lesion.        With the initial diagnosis, she initiated treatment with 6 cycles of CAF neoadjuvant chemotherapy 02/14, 07/07, 03/28, 04/18/14, 05/08 and 05/29/14. She also received monthly zoledronic acid.  She then underwent a modified left mastectomy of Palacios type and left  lymphadenoectomy on 06/27/2014.   Pathologic examination showed number at [facility] was [id number] showed infiltrative grade 2 ductal cancer with 6 level 1 metastatic lymph nodes and 3 level 2 metastatic lymph nodes.  The differentiation was intermediate.  The tumor was ER positive 70% of the cells, OR positive in 40% of the cells and HER2 was 3+ by immunohistochemistry and the Ki-67 labeling index was 20%. Her staging after surgery was stage IV, pT4b N2 M1.  I don't see a biopsy of the skeletal metastases.  She then had continuation with chemotherapy with 4 cycles of Herceptin and taxane with monthly zoledronic acid.  Tamoxifen was initiated.  She then had two years of Herceptin and a decision was made not to continue further Herceptin.  She continued on zoledronic acid every 3 months. She was clinically stable. [place] then [place] to the [place] new refugee from [facility].  She has now been changed to letrozole.  Denosumab was held for new diagnosis of osteonecrosis.     History of cholecystectomy 2020.      TREATMENT HISTORY:  A. Initial diagnosis with metastatic breast cancer in [facility].  Neoadjuvant CAF x 6.   B. Left mastectomy. Left axillary node dissection.  C. Radiation in 1 dose to R iliac region.  C. Herceptin for 2 years taxane for a prescribed course then stopped, monthly zoledronic acid.  She had 2 years of Herceptin with tamoxifen added after chemotherapy.  D. Tamoxifen alone and zoledronic acid every 3 months starting 2014.  Letrozole was started   E. [place]  We restarted Herceptin every 3 weeks and continued tamoxifen. Bone targeted agent changed to denosumab every 9 weeks. Zometa discontinued 2017.  Tamoxifen was changed to letrozole August 2019.    F. Xgeva discontinued 12-17-19 because of dental issues.   G. J+J vaccine March, 2021.  H. Was on Zometa once May 11.  Reaction with eye lid swelling. Discontinued Zometa.  H. Restart Xgeva 6-22-21.  Continuing the trastuzumab and letrozole.  Both  tolerated well.       INTERVAL HISTORY:  Hoda returns to clinic today and is feeling well. She has no new concerns today.  She reports that the right posterior hip discomfort that she discussed at her last visit is better.  She still sometimes experiences some discomfort when she stands in place for a long time, and if she changes her position, the discomfort resolves.  She continues to stay active, walking daily.  She does have a treadmill at home for walking in the winter.  She reports that her energy is good.  She denies fevers or chills or signs of systemic illness.  She denies headache, dizziness or vision changes.  She denies cough, chest pain, or shortness of breath at rest.  She denies nausea, vomiting, abdominal pain or bowel changes.  She continues on trastuzumab, letrozole and Xgeva, and is tolerating these well.    ROS: 12 point ROS neg other than the symptoms noted above in the HPI.    PHYSICAL EXAMINATION:    /68 (BP Location: Right arm, Patient Position: Sitting, Cuff Size: Adult Regular)   Pulse 77   Temp 98.2  F (36.8  C) (Oral)   Resp 16   Wt 82.9 kg (182 lb 12.8 oz)   SpO2 95%   BMI 33.21 kg/m    General: No acute distress  HEENT: Sclera anicteric.   Lymph: No cervical or supraclavicular lymphadenopathy noted.  Heart: Regular, rate, and rhythm.  Lungs: Clear to ascultation bilaterally.  Abdomen: Positive bowel sounds. Soft, non-distended, non-tender.   Extremities: No lower extremity edema  Neuro: Cranial nerves grossly intact  Rash: None noted on visible skin.     LABORATORY DATA:   Most Recent 3 CBC's:  Recent Labs   Lab Test 09/28/23  0955 09/07/23  1054 08/17/23  0800   WBC 6.2 6.5 6.5   HGB 12.4 12.6 12.6   MCV 94 93 93    170 236   ANEUTAUTO 3.2 3.3 3.2     Most Recent 3 BMP's:  Recent Labs   Lab Test 09/28/23  0955 09/07/23  1054 08/17/23  0800    139 140   POTASSIUM 4.2 4.2 4.2   CHLORIDE 106 106 106   CO2 28 20* 28   BUN 11.7 13.2 15.1   CR 0.80 0.78 0.85    ANIONGAP 8 13 6*   TAYLER 8.7* 9.1 9.3   GLC 96 91 103*   PROTTOTAL 7.5 7.3 7.3   ALBUMIN 4.1 3.9 4.2    Most Recent 3 LFT's:  Recent Labs   Lab Test 09/28/23  0955 09/07/23  1054 08/17/23  0800   AST 51* 48* 36   ALT 53* 39 41   ALKPHOS 48 47 51   BILITOTAL 0.3 0.3 0.3    Most Recent 2 TSH and T4:  Recent Labs   Lab Test 07/06/23  1216 12/09/21  1438   TSH 0.77 1.01     IMAGING:  No new imaging to review today.    I reviewed the above labs and imaging today.     ASSESSMENT AND PLAN:       Hoda Brush is a 68-year-old woman with a history of metastatic ER-positive, DE-positive, HER2 positive breast cancer to the bones.  She is from [facility], formally from [facility], and came to live in the [place] with [place] daughter.   She had metastatic disease at the time of presentation with bone-only metastases by report.  She underwent neoadjuvant CAF, had a left mastectomy, left axillary lymph node dissection, radiation to the right hip, which included the right iliac region where she had metastatic disease.  She was initially on tamoxifen but then was switched to letrozole.  She continues on this and every 3-week trastuzumab.  She has had no evidence of disease progression for the last 3 years.  Markers are low and stable, pending today.  We have continued Herceptin every 3 weeks as well as daily letrozole. CT CAP shows stable pulmonary nodules and stable sclerotic bone metastasis.   - Labs and clinically appropriate to continue herceptin and letrozole.   - 07/2023 echo overall stable with EF of 55%, unchanged from 04/2023 study. Next due and scheduled in October 2023.   - Continue CT CAP every 4 months.     Bone Metastasis   - Hx of R maxillary osteonecrosis has healed. Cleared to restart Xgeva. Will continue every 3 months and administered on 8/17/2023. Per patient recent dental exam 08/16/23 with no concerns.   - R hip discomfort reported in her August visit improved overall.  She now only experiences this as mild  discomfort when standing for long periods.  Resolves with position changes.  She will call with any new concerns.      ALT/AST elevation  - AST elevation at last lab draw which persists today and ALT also mildly elevated.   - Per chart review she has had intermittent elevations over the last several years.   - Most recent CT 8/16/2023 with no concerning findings; does have hepatic steatosis and is s/p cholecystectomy in 2020.   - No dose adjustments needed at this time.  - Medications reviewed and discussed with patient.  She has not taken anything new.   - Continue to closely monitor.    Follow up   - Continue herceptin and labs every three weeks with provider visits every 6 weeks. Echo scheduled for 10/19    KATHI Valencia, CNP  [facility]  [address] [place] [address] [phone]

## 2023-09-29 DIAGNOSIS — C50.912 MALIGNANT NEOPLASM OF LEFT FEMALE BREAST, UNSPECIFIED ESTROGEN RECEPTOR STATUS, UNSPECIFIED SITE OF BREAST (H): Primary | ICD-10-CM

## 2023-09-29 LAB — CANCER AG27-29 SERPL-ACNC: 9.3 U/ML

## 2023-09-29 RX ORDER — LETROZOLE 2.5 MG/1
2.5 TABLET, FILM COATED ORAL DAILY
Qty: 90 TABLET | Refills: 3 | Status: SHIPPED | OUTPATIENT
Start: 2023-09-29 | End: 2024-07-18

## 2023-09-29 NOTE — TELEPHONE ENCOUNTER
Last prescribing provider: Stefania    Last clinic visit date: 9/28    Recommendations for requested medication (if none, N/A): NA    Any other pertinent information (if none, N/A): NA    Refilled: Y/N, if NO, why?

## 2023-10-19 ENCOUNTER — ANCILLARY PROCEDURE (OUTPATIENT)
Dept: CARDIOLOGY | Facility: CLINIC | Age: 68
End: 2023-10-19
Payer: COMMERCIAL

## 2023-10-19 ENCOUNTER — INFUSION THERAPY VISIT (OUTPATIENT)
Dept: ONCOLOGY | Facility: CLINIC | Age: 68
End: 2023-10-19
Attending: INTERNAL MEDICINE
Payer: COMMERCIAL

## 2023-10-19 ENCOUNTER — APPOINTMENT (OUTPATIENT)
Dept: LAB | Facility: CLINIC | Age: 68
End: 2023-10-19
Attending: INTERNAL MEDICINE
Payer: COMMERCIAL

## 2023-10-19 VITALS
WEIGHT: 182.2 LBS | HEART RATE: 69 BPM | BODY MASS INDEX: 33.11 KG/M2 | RESPIRATION RATE: 16 BRPM | SYSTOLIC BLOOD PRESSURE: 115 MMHG | OXYGEN SATURATION: 97 % | TEMPERATURE: 97.9 F | DIASTOLIC BLOOD PRESSURE: 62 MMHG

## 2023-10-19 DIAGNOSIS — Z79.899 ENCOUNTER FOR MONITORING CARDIOTOXIC DRUG THERAPY: ICD-10-CM

## 2023-10-19 DIAGNOSIS — C79.51 MALIGNANT NEOPLASM METASTATIC TO BONE (H): ICD-10-CM

## 2023-10-19 DIAGNOSIS — Z51.81 ENCOUNTER FOR MONITORING CARDIOTOXIC DRUG THERAPY: ICD-10-CM

## 2023-10-19 DIAGNOSIS — C50.912 MALIGNANT NEOPLASM OF LEFT FEMALE BREAST, UNSPECIFIED ESTROGEN RECEPTOR STATUS, UNSPECIFIED SITE OF BREAST (H): Primary | ICD-10-CM

## 2023-10-19 DIAGNOSIS — C50.912 MALIGNANT NEOPLASM OF LEFT FEMALE BREAST, UNSPECIFIED ESTROGEN RECEPTOR STATUS, UNSPECIFIED SITE OF BREAST (H): ICD-10-CM

## 2023-10-19 LAB
ALBUMIN SERPL BCG-MCNC: 4.2 G/DL (ref 3.5–5.2)
ALP SERPL-CCNC: 49 U/L (ref 35–104)
ALT SERPL W P-5'-P-CCNC: 53 U/L (ref 0–50)
ANION GAP SERPL CALCULATED.3IONS-SCNC: 10 MMOL/L (ref 7–15)
AST SERPL W P-5'-P-CCNC: 56 U/L (ref 0–45)
BASO+EOS+MONOS # BLD AUTO: ABNORMAL 10*3/UL
BASO+EOS+MONOS NFR BLD AUTO: ABNORMAL %
BASOPHILS # BLD AUTO: 0 10E3/UL (ref 0–0.2)
BASOPHILS NFR BLD AUTO: 1 %
BILIRUB SERPL-MCNC: 0.4 MG/DL
BUN SERPL-MCNC: 15.2 MG/DL (ref 8–23)
CALCIUM SERPL-MCNC: 9.5 MG/DL (ref 8.8–10.2)
CEA SERPL-MCNC: 0.8 NG/ML
CHLORIDE SERPL-SCNC: 103 MMOL/L (ref 98–107)
CREAT SERPL-MCNC: 0.81 MG/DL (ref 0.51–0.95)
DEPRECATED HCO3 PLAS-SCNC: 28 MMOL/L (ref 22–29)
EGFRCR SERPLBLD CKD-EPI 2021: 79 ML/MIN/1.73M2
EOSINOPHIL # BLD AUTO: 0.2 10E3/UL (ref 0–0.7)
EOSINOPHIL NFR BLD AUTO: 3 %
ERYTHROCYTE [DISTWIDTH] IN BLOOD BY AUTOMATED COUNT: 13.5 % (ref 10–15)
GLUCOSE SERPL-MCNC: 92 MG/DL (ref 70–99)
HCT VFR BLD AUTO: 39.7 % (ref 35–47)
HGB BLD-MCNC: 12.4 G/DL (ref 11.7–15.7)
IMM GRANULOCYTES # BLD: 0 10E3/UL
IMM GRANULOCYTES NFR BLD: 0 %
LVEF ECHO: NORMAL
LYMPHOCYTES # BLD AUTO: 2.8 10E3/UL (ref 0.8–5.3)
LYMPHOCYTES NFR BLD AUTO: 43 %
MCH RBC QN AUTO: 29 PG (ref 26.5–33)
MCHC RBC AUTO-ENTMCNC: 31.2 G/DL (ref 31.5–36.5)
MCV RBC AUTO: 93 FL (ref 78–100)
MONOCYTES # BLD AUTO: 0.5 10E3/UL (ref 0–1.3)
MONOCYTES NFR BLD AUTO: 7 %
NEUTROPHILS # BLD AUTO: 3 10E3/UL (ref 1.6–8.3)
NEUTROPHILS NFR BLD AUTO: 46 %
NRBC # BLD AUTO: 0 10E3/UL
NRBC BLD AUTO-RTO: 0 /100
PLATELET # BLD AUTO: 205 10E3/UL (ref 150–450)
POTASSIUM SERPL-SCNC: 4.3 MMOL/L (ref 3.4–5.3)
PROT SERPL-MCNC: 7.6 G/DL (ref 6.4–8.3)
RBC # BLD AUTO: 4.28 10E6/UL (ref 3.8–5.2)
SODIUM SERPL-SCNC: 141 MMOL/L (ref 135–145)
WBC # BLD AUTO: 6.6 10E3/UL (ref 4–11)

## 2023-10-19 PROCEDURE — 82378 CARCINOEMBRYONIC ANTIGEN: CPT | Performed by: INTERNAL MEDICINE

## 2023-10-19 PROCEDURE — 36591 DRAW BLOOD OFF VENOUS DEVICE: CPT | Performed by: INTERNAL MEDICINE

## 2023-10-19 PROCEDURE — 86300 IMMUNOASSAY TUMOR CA 15-3: CPT | Performed by: INTERNAL MEDICINE

## 2023-10-19 PROCEDURE — 80053 COMPREHEN METABOLIC PANEL: CPT | Performed by: INTERNAL MEDICINE

## 2023-10-19 PROCEDURE — 250N000011 HC RX IP 250 OP 636: Mod: JZ | Performed by: INTERNAL MEDICINE

## 2023-10-19 PROCEDURE — 93306 TTE W/DOPPLER COMPLETE: CPT | Performed by: INTERNAL MEDICINE

## 2023-10-19 PROCEDURE — 258N000003 HC RX IP 258 OP 636: Performed by: INTERNAL MEDICINE

## 2023-10-19 PROCEDURE — 85004 AUTOMATED DIFF WBC COUNT: CPT | Performed by: INTERNAL MEDICINE

## 2023-10-19 PROCEDURE — 96413 CHEMO IV INFUSION 1 HR: CPT

## 2023-10-19 RX ORDER — HEPARIN SODIUM (PORCINE) LOCK FLUSH IV SOLN 100 UNIT/ML 100 UNIT/ML
5 SOLUTION INTRAVENOUS EVERY 8 HOURS
Status: CANCELLED | OUTPATIENT
Start: 2023-10-19

## 2023-10-19 RX ORDER — ACETAMINOPHEN 325 MG/1
650 TABLET ORAL
Status: CANCELLED | OUTPATIENT
Start: 2023-10-19

## 2023-10-19 RX ORDER — METHYLPREDNISOLONE SODIUM SUCCINATE 125 MG/2ML
125 INJECTION, POWDER, LYOPHILIZED, FOR SOLUTION INTRAMUSCULAR; INTRAVENOUS
Status: CANCELLED
Start: 2023-10-19

## 2023-10-19 RX ORDER — EPINEPHRINE 0.3 MG/.3ML
0.3 INJECTION SUBCUTANEOUS EVERY 5 MIN PRN
Status: CANCELLED | OUTPATIENT
Start: 2023-10-19

## 2023-10-19 RX ORDER — EPINEPHRINE 1 MG/ML
0.3 INJECTION, SOLUTION, CONCENTRATE INTRAVENOUS EVERY 5 MIN PRN
Status: CANCELLED | OUTPATIENT
Start: 2023-10-19

## 2023-10-19 RX ORDER — DIPHENHYDRAMINE HYDROCHLORIDE 50 MG/ML
50 INJECTION INTRAMUSCULAR; INTRAVENOUS
Status: CANCELLED
Start: 2023-10-19

## 2023-10-19 RX ORDER — SODIUM CHLORIDE 9 MG/ML
1000 INJECTION, SOLUTION INTRAVENOUS CONTINUOUS PRN
Status: CANCELLED
Start: 2023-10-19

## 2023-10-19 RX ORDER — DIPHENHYDRAMINE HCL 25 MG
50 CAPSULE ORAL ONCE
Status: CANCELLED
Start: 2023-10-19

## 2023-10-19 RX ORDER — HEPARIN SODIUM (PORCINE) LOCK FLUSH IV SOLN 100 UNIT/ML 100 UNIT/ML
500 SOLUTION INTRAVENOUS ONCE
Status: COMPLETED | OUTPATIENT
Start: 2023-10-19 | End: 2023-10-19

## 2023-10-19 RX ORDER — ALBUTEROL SULFATE 0.83 MG/ML
2.5 SOLUTION RESPIRATORY (INHALATION)
Status: CANCELLED | OUTPATIENT
Start: 2023-10-19

## 2023-10-19 RX ORDER — ALBUTEROL SULFATE 90 UG/1
1-2 AEROSOL, METERED RESPIRATORY (INHALATION)
Status: CANCELLED
Start: 2023-10-19

## 2023-10-19 RX ORDER — LORAZEPAM 2 MG/ML
0.5 INJECTION INTRAMUSCULAR EVERY 4 HOURS PRN
Status: CANCELLED
Start: 2023-10-19

## 2023-10-19 RX ORDER — HEPARIN SODIUM (PORCINE) LOCK FLUSH IV SOLN 100 UNIT/ML 100 UNIT/ML
5 SOLUTION INTRAVENOUS EVERY 8 HOURS
Status: DISCONTINUED | OUTPATIENT
Start: 2023-10-19 | End: 2023-10-19 | Stop reason: HOSPADM

## 2023-10-19 RX ADMIN — TRASTUZUMAB 450 MG: 150 INJECTION, POWDER, LYOPHILIZED, FOR SOLUTION INTRAVENOUS at 13:37

## 2023-10-19 RX ADMIN — Medication 5 ML: at 14:16

## 2023-10-19 RX ADMIN — Medication 500 UNITS: at 12:25

## 2023-10-19 RX ADMIN — SODIUM CHLORIDE 250 ML: 9 INJECTION, SOLUTION INTRAVENOUS at 12:47

## 2023-10-19 ASSESSMENT — PAIN SCALES - GENERAL: PAINLEVEL: NO PAIN (0)

## 2023-10-19 NOTE — PATIENT INSTRUCTIONS
[facility] Triage and after hours / weekends / holidays:  [phone]    Please call the triage or after hours line if you experience a temperature greater than or equal to 100.4, shaking chills, have uncontrolled nausea, vomiting and/or diarrhea, dizziness, shortness of breath, chest pain, bleeding, unexplained bruising, or if you have any other new/concerning symptoms, questions or concerns.      If you are having any concerning symptoms or wish to speak to a provider before your next infusion visit, please call triage to notify them so we can adequately serve you.     If you need a refill on a narcotic prescription or other medication, please call before your infusion appointment.                October 2023 Sunday Monday Tuesday Wednesday Thursday Friday Saturday   1     2     3     4     5     6     7       8     9     10     11     12     13     14       15     16     17     18     19    ECHO COMPLETE  10:15 AM   (60 min.)   [id number]   [facility]    LAB CENTRAL  12:00 PM   (15 min.)   [facility] LAB DRAW   [facility]    ONC INFUSION 1 HR (60 MIN)  12:30 PM   (60 min.)    ONC INFUSION NURSE   [facility] 20     21       22     23     24     25     26     27     28       29     30     31                                     November 2023 Sunday Monday Tuesday Wednesday Thursday Friday Saturday                  1     2     3     4       5     6     7     8     9    LAB CENTRAL  10:00 AM   (15 min.)   [facility] LAB DRAW   [facility]    RETURN CCSL  10:15 AM   (45 min.)   Kassidy Robles APRN CNP   [facility]    ONC INFUSION 1 HR (60 MIN)  11:30 AM   (60 min.)    ONC INFUSION NURSE   [facility] 10     11       12     13     14     15     16     17     18       19     20     21     22     23     24     25       26     27      28     29     30    LAB CENTRAL  10:30 AM   (15 min.)   [facility] LAB DRAW   [facility]    ONC INFUSION 1 HR (60 MIN)  11:00 AM   (60 min.)    ONC INFUSION NURSE   [facility]                           Lab Results:  Recent Results (from the past 12 hour(s))   Echocardiogram Complete    Collection Time: 10/19/23 11:08 AM   Result Value Ref Range    LVEF  60-65%    Comprehensive metabolic panel    Collection Time: 10/19/23 12:23 PM   Result Value Ref Range    Sodium 141 135 - 145 mmol/L    Potassium 4.3 3.4 - 5.3 mmol/L    Carbon Dioxide (CO2) 28 22 - 29 mmol/L    Anion Gap 10 7 - 15 mmol/L    Urea Nitrogen 15.2 8.0 - 23.0 mg/dL    Creatinine 0.81 0.51 - 0.95 mg/dL    GFR Estimate 79 >60 mL/min/1.73m2    Calcium 9.5 8.8 - 10.2 mg/dL    Chloride 103 98 - 107 mmol/L    Glucose 92 70 - 99 mg/dL    Alkaline Phosphatase 49 35 - 104 U/L    AST 56 (H) 0 - 45 U/L    ALT 53 (H) 0 - 50 U/L    Protein Total 7.6 6.4 - 8.3 g/dL    Albumin 4.2 3.5 - 5.2 g/dL    Bilirubin Total 0.4 <=1.2 mg/dL   CBC with platelets and differential    Collection Time: 10/19/23 12:23 PM   Result Value Ref Range    WBC Count 6.6 4.0 - 11.0 10e3/uL    RBC Count 4.28 3.80 - 5.20 10e6/uL    Hemoglobin 12.4 11.7 - 15.7 g/dL    Hematocrit 39.7 35.0 - 47.0 %    MCV 93 78 - 100 fL    MCH 29.0 26.5 - 33.0 pg    MCHC 31.2 (L) 31.5 - 36.5 g/dL    RDW 13.5 10.0 - 15.0 %    Platelet Count 205 150 - 450 10e3/uL    % Neutrophils 46 %    % Lymphocytes 43 %    % Monocytes 7 %    Mids % (Monos, Eos, Basos)      % Eosinophils 3 %    % Basophils 1 %    % Immature Granulocytes 0 %    NRBCs per 100 WBC 0 <1 /100    Absolute Neutrophils 3.0 1.6 - 8.3 10e3/uL    Absolute Lymphocytes 2.8 0.8 - 5.3 10e3/uL    Absolute Monocytes 0.5 0.0 - 1.3 10e3/uL    Mids Abs (Monos, Eos, Basos)      Absolute Eosinophils 0.2 0.0 - 0.7 10e3/uL    Absolute Basophils 0.0 0.0 - 0.2 10e3/uL    Absolute Immature Granulocytes 0.0 <=0.4  10e3/uL    Absolute NRBCs 0.0 10e3/uL

## 2023-10-19 NOTE — PROGRESS NOTES
Infusion Nursing Note:  Hoda Brush presents today for cycle 105 day 1 trastuzumab.    Patient seen by provider today: No   present during visit today: Not Applicable.    Note: Pt comes to infusion today with no questions or concerns.  Pt has no pain today and denies any need for intervention at this appointment.  Pt has been afebrile and denies signs and symptoms of infection including: cough, SOB, sore throat, diarrhea, vomiting, rash, or pain with urination.  Pt wishes to proceed with today's planned treatment.    Intravenous Access:  Implanted Port.    Treatment Conditions:  Lab Results   Component Value Date    HGB 12.4 10/19/2023    WBC 6.6 10/19/2023    ANEU 3.1 06/22/2021    ANEUTAUTO 3.0 10/19/2023     10/19/2023        Lab Results   Component Value Date     10/19/2023    POTASSIUM 4.3 10/19/2023    CR 0.81 10/19/2023    TAYLER 9.5 10/19/2023    BILITOTAL 0.4 10/19/2023    ALBUMIN 4.2 10/19/2023    ALT 53 (H) 10/19/2023    AST 56 (H) 10/19/2023   Results reviewed, labs MET treatment parameters, ok to proceed with treatment.  ECHO/MUGA completed 10/19/23  EF 60-65%    Post Infusion Assessment:  Patient tolerated infusion without incident.  Blood return noted pre and post infusion.  Site patent and intact, free from redness, edema or discomfort.  No evidence of extravasations.  Access discontinued per protocol.     Discharge Plan:   Patient declined prescription refills.  Discharge instructions reviewed with: Patient.  Patient and/or family verbalized understanding of discharge instructions and all questions answered.  AVS to patient via [employer].  Patient will return 11/09/23 for next appointment.   Patient discharged in stable condition accompanied by: self.  Departure Mode: Ambulatory.      Emily Meese, RN

## 2023-10-21 LAB — CANCER AG27-29 SERPL-ACNC: 10 U/ML

## 2023-10-21 RX ORDER — ALBUTEROL SULFATE 90 UG/1
1-2 AEROSOL, METERED RESPIRATORY (INHALATION)
Status: CANCELLED
Start: 2023-11-09

## 2023-10-21 RX ORDER — SODIUM CHLORIDE 9 MG/ML
1000 INJECTION, SOLUTION INTRAVENOUS CONTINUOUS PRN
Status: CANCELLED
Start: 2023-11-09

## 2023-10-21 RX ORDER — DIPHENHYDRAMINE HCL 25 MG
50 CAPSULE ORAL ONCE
Status: CANCELLED
Start: 2023-11-30

## 2023-10-21 RX ORDER — DIPHENHYDRAMINE HCL 25 MG
50 CAPSULE ORAL ONCE
Status: CANCELLED
Start: 2023-11-09

## 2023-10-21 RX ORDER — METHYLPREDNISOLONE SODIUM SUCCINATE 125 MG/2ML
125 INJECTION, POWDER, LYOPHILIZED, FOR SOLUTION INTRAMUSCULAR; INTRAVENOUS
Status: CANCELLED
Start: 2023-11-30

## 2023-10-21 RX ORDER — SODIUM CHLORIDE 9 MG/ML
1000 INJECTION, SOLUTION INTRAVENOUS CONTINUOUS PRN
Status: CANCELLED
Start: 2023-11-30

## 2023-10-21 RX ORDER — METHYLPREDNISOLONE SODIUM SUCCINATE 125 MG/2ML
125 INJECTION, POWDER, LYOPHILIZED, FOR SOLUTION INTRAMUSCULAR; INTRAVENOUS
Status: CANCELLED
Start: 2023-11-09

## 2023-10-21 RX ORDER — HEPARIN SODIUM (PORCINE) LOCK FLUSH IV SOLN 100 UNIT/ML 100 UNIT/ML
5 SOLUTION INTRAVENOUS EVERY 8 HOURS
Status: CANCELLED | OUTPATIENT
Start: 2023-11-30

## 2023-10-21 RX ORDER — LORAZEPAM 2 MG/ML
0.5 INJECTION INTRAMUSCULAR EVERY 4 HOURS PRN
Status: CANCELLED
Start: 2023-11-30

## 2023-10-21 RX ORDER — EPINEPHRINE 0.3 MG/.3ML
0.3 INJECTION SUBCUTANEOUS EVERY 5 MIN PRN
Status: CANCELLED | OUTPATIENT
Start: 2023-11-09

## 2023-10-21 RX ORDER — DIPHENHYDRAMINE HYDROCHLORIDE 50 MG/ML
50 INJECTION INTRAMUSCULAR; INTRAVENOUS
Status: CANCELLED
Start: 2023-11-09

## 2023-10-21 RX ORDER — ALBUTEROL SULFATE 0.83 MG/ML
2.5 SOLUTION RESPIRATORY (INHALATION)
Status: CANCELLED | OUTPATIENT
Start: 2023-11-09

## 2023-10-21 RX ORDER — ALBUTEROL SULFATE 90 UG/1
1-2 AEROSOL, METERED RESPIRATORY (INHALATION)
Status: CANCELLED
Start: 2023-11-30

## 2023-10-21 RX ORDER — LORAZEPAM 2 MG/ML
0.5 INJECTION INTRAMUSCULAR EVERY 4 HOURS PRN
Status: CANCELLED
Start: 2023-11-09

## 2023-10-21 RX ORDER — HEPARIN SODIUM (PORCINE) LOCK FLUSH IV SOLN 100 UNIT/ML 100 UNIT/ML
5 SOLUTION INTRAVENOUS EVERY 8 HOURS
Status: CANCELLED | OUTPATIENT
Start: 2023-11-09

## 2023-10-21 RX ORDER — EPINEPHRINE 1 MG/ML
0.3 INJECTION, SOLUTION INTRAMUSCULAR; SUBCUTANEOUS EVERY 5 MIN PRN
Status: CANCELLED | OUTPATIENT
Start: 2023-11-09

## 2023-10-21 RX ORDER — ACETAMINOPHEN 325 MG/1
650 TABLET ORAL
Status: CANCELLED | OUTPATIENT
Start: 2023-11-30

## 2023-10-21 RX ORDER — EPINEPHRINE 0.3 MG/.3ML
0.3 INJECTION SUBCUTANEOUS EVERY 5 MIN PRN
Status: CANCELLED | OUTPATIENT
Start: 2023-11-30

## 2023-10-21 RX ORDER — ACETAMINOPHEN 325 MG/1
650 TABLET ORAL
Status: CANCELLED | OUTPATIENT
Start: 2023-11-09

## 2023-10-21 RX ORDER — EPINEPHRINE 1 MG/ML
0.3 INJECTION, SOLUTION INTRAMUSCULAR; SUBCUTANEOUS EVERY 5 MIN PRN
Status: CANCELLED | OUTPATIENT
Start: 2023-11-30

## 2023-10-21 RX ORDER — DIPHENHYDRAMINE HYDROCHLORIDE 50 MG/ML
50 INJECTION INTRAMUSCULAR; INTRAVENOUS
Status: CANCELLED
Start: 2023-11-30

## 2023-10-21 RX ORDER — ALBUTEROL SULFATE 0.83 MG/ML
2.5 SOLUTION RESPIRATORY (INHALATION)
Status: CANCELLED | OUTPATIENT
Start: 2023-11-30

## 2023-11-09 ENCOUNTER — APPOINTMENT (OUTPATIENT)
Dept: LAB | Facility: CLINIC | Age: 68
End: 2023-11-09
Attending: NURSE PRACTITIONER
Payer: COMMERCIAL

## 2023-11-09 ENCOUNTER — ONCOLOGY VISIT (OUTPATIENT)
Dept: ONCOLOGY | Facility: CLINIC | Age: 68
End: 2023-11-09
Attending: NURSE PRACTITIONER
Payer: COMMERCIAL

## 2023-11-09 VITALS
RESPIRATION RATE: 16 BRPM | DIASTOLIC BLOOD PRESSURE: 70 MMHG | TEMPERATURE: 98.4 F | SYSTOLIC BLOOD PRESSURE: 109 MMHG | OXYGEN SATURATION: 97 % | HEART RATE: 82 BPM | BODY MASS INDEX: 33.23 KG/M2 | WEIGHT: 182.9 LBS

## 2023-11-09 DIAGNOSIS — C50.912 MALIGNANT NEOPLASM OF LEFT FEMALE BREAST, UNSPECIFIED ESTROGEN RECEPTOR STATUS, UNSPECIFIED SITE OF BREAST (H): Primary | ICD-10-CM

## 2023-11-09 DIAGNOSIS — C79.51 MALIGNANT NEOPLASM METASTATIC TO BONE (H): ICD-10-CM

## 2023-11-09 DIAGNOSIS — G47.00 INSOMNIA, UNSPECIFIED TYPE: ICD-10-CM

## 2023-11-09 DIAGNOSIS — C50.912 MALIGNANT NEOPLASM OF LEFT FEMALE BREAST, UNSPECIFIED ESTROGEN RECEPTOR STATUS, UNSPECIFIED SITE OF BREAST (H): ICD-10-CM

## 2023-11-09 LAB
ALBUMIN SERPL BCG-MCNC: 4.1 G/DL (ref 3.5–5.2)
ALP SERPL-CCNC: 52 U/L (ref 35–104)
ALT SERPL W P-5'-P-CCNC: 52 U/L (ref 0–50)
ANION GAP SERPL CALCULATED.3IONS-SCNC: 8 MMOL/L (ref 7–15)
AST SERPL W P-5'-P-CCNC: 53 U/L (ref 0–45)
BASOPHILS # BLD AUTO: 0 10E3/UL (ref 0–0.2)
BASOPHILS NFR BLD AUTO: 1 %
BILIRUB SERPL-MCNC: 0.3 MG/DL
BUN SERPL-MCNC: 10.8 MG/DL (ref 8–23)
CALCIUM SERPL-MCNC: 9.8 MG/DL (ref 8.8–10.2)
CEA SERPL-MCNC: 0.9 NG/ML
CHLORIDE SERPL-SCNC: 106 MMOL/L (ref 98–107)
CREAT SERPL-MCNC: 0.79 MG/DL (ref 0.51–0.95)
DEPRECATED HCO3 PLAS-SCNC: 29 MMOL/L (ref 22–29)
EGFRCR SERPLBLD CKD-EPI 2021: 82 ML/MIN/1.73M2
EOSINOPHIL # BLD AUTO: 0.2 10E3/UL (ref 0–0.7)
EOSINOPHIL NFR BLD AUTO: 3 %
ERYTHROCYTE [DISTWIDTH] IN BLOOD BY AUTOMATED COUNT: 13.2 % (ref 10–15)
GLUCOSE SERPL-MCNC: 111 MG/DL (ref 70–99)
HCT VFR BLD AUTO: 39.7 % (ref 35–47)
HGB BLD-MCNC: 12.8 G/DL (ref 11.7–15.7)
IMM GRANULOCYTES # BLD: 0 10E3/UL
IMM GRANULOCYTES NFR BLD: 0 %
LYMPHOCYTES # BLD AUTO: 2.5 10E3/UL (ref 0.8–5.3)
LYMPHOCYTES NFR BLD AUTO: 40 %
MCH RBC QN AUTO: 29.8 PG (ref 26.5–33)
MCHC RBC AUTO-ENTMCNC: 32.2 G/DL (ref 31.5–36.5)
MCV RBC AUTO: 92 FL (ref 78–100)
MONOCYTES # BLD AUTO: 0.3 10E3/UL (ref 0–1.3)
MONOCYTES NFR BLD AUTO: 5 %
NEUTROPHILS # BLD AUTO: 3.2 10E3/UL (ref 1.6–8.3)
NEUTROPHILS NFR BLD AUTO: 51 %
NRBC # BLD AUTO: 0 10E3/UL
NRBC BLD AUTO-RTO: 0 /100
PLATELET # BLD AUTO: 210 10E3/UL (ref 150–450)
POTASSIUM SERPL-SCNC: 4.1 MMOL/L (ref 3.4–5.3)
PROT SERPL-MCNC: 7.6 G/DL (ref 6.4–8.3)
RBC # BLD AUTO: 4.3 10E6/UL (ref 3.8–5.2)
SODIUM SERPL-SCNC: 143 MMOL/L (ref 135–145)
WBC # BLD AUTO: 6.3 10E3/UL (ref 4–11)

## 2023-11-09 PROCEDURE — 258N000003 HC RX IP 258 OP 636: Performed by: INTERNAL MEDICINE

## 2023-11-09 PROCEDURE — 86300 IMMUNOASSAY TUMOR CA 15-3: CPT | Performed by: INTERNAL MEDICINE

## 2023-11-09 PROCEDURE — 99213 OFFICE O/P EST LOW 20 MIN: CPT | Performed by: NURSE PRACTITIONER

## 2023-11-09 PROCEDURE — 250N000011 HC RX IP 250 OP 636: Mod: JZ | Performed by: NURSE PRACTITIONER

## 2023-11-09 PROCEDURE — 36591 DRAW BLOOD OFF VENOUS DEVICE: CPT | Performed by: NURSE PRACTITIONER

## 2023-11-09 PROCEDURE — 85004 AUTOMATED DIFF WBC COUNT: CPT | Performed by: NURSE PRACTITIONER

## 2023-11-09 PROCEDURE — 96372 THER/PROPH/DIAG INJ SC/IM: CPT | Mod: 59 | Performed by: INTERNAL MEDICINE

## 2023-11-09 PROCEDURE — 250N000011 HC RX IP 250 OP 636: Mod: JZ | Performed by: INTERNAL MEDICINE

## 2023-11-09 PROCEDURE — G0463 HOSPITAL OUTPT CLINIC VISIT: HCPCS | Mod: 25 | Performed by: NURSE PRACTITIONER

## 2023-11-09 PROCEDURE — 96372 THER/PROPH/DIAG INJ SC/IM: CPT | Performed by: INTERNAL MEDICINE

## 2023-11-09 PROCEDURE — 96413 CHEMO IV INFUSION 1 HR: CPT

## 2023-11-09 PROCEDURE — 80053 COMPREHEN METABOLIC PANEL: CPT | Performed by: NURSE PRACTITIONER

## 2023-11-09 PROCEDURE — 82378 CARCINOEMBRYONIC ANTIGEN: CPT | Performed by: INTERNAL MEDICINE

## 2023-11-09 RX ORDER — HEPARIN SODIUM (PORCINE) LOCK FLUSH IV SOLN 100 UNIT/ML 100 UNIT/ML
5 SOLUTION INTRAVENOUS EVERY 8 HOURS
Status: DISCONTINUED | OUTPATIENT
Start: 2023-11-09 | End: 2023-11-09 | Stop reason: HOSPADM

## 2023-11-09 RX ORDER — TRAZODONE HYDROCHLORIDE 50 MG/1
TABLET, FILM COATED ORAL
Qty: 45 TABLET | Refills: 2 | Status: SHIPPED | OUTPATIENT
Start: 2023-11-09

## 2023-11-09 RX ORDER — HEPARIN SODIUM (PORCINE) LOCK FLUSH IV SOLN 100 UNIT/ML 100 UNIT/ML
5 SOLUTION INTRAVENOUS ONCE
Status: COMPLETED | OUTPATIENT
Start: 2023-11-09 | End: 2023-11-09

## 2023-11-09 RX ADMIN — TRASTUZUMAB 450 MG: 150 INJECTION, POWDER, LYOPHILIZED, FOR SOLUTION INTRAVENOUS at 11:44

## 2023-11-09 RX ADMIN — Medication 5 ML: at 11:44

## 2023-11-09 RX ADMIN — DENOSUMAB 120 MG: 120 INJECTION SUBCUTANEOUS at 10:53

## 2023-11-09 RX ADMIN — Medication 5 ML: at 10:05

## 2023-11-09 ASSESSMENT — PAIN SCALES - GENERAL: PAINLEVEL: NO PAIN (0)

## 2023-11-09 NOTE — PROGRESS NOTES
Infusion Nursing Note:  Hoda Brush presents today for Cycle 106 Day 1 Trastuzuamb and Xgeva.    Patient seen by provider today: Yes: Kassidy Robles, CNP   present during visit today: Not Applicable.    Note: Patient presents to the infusion center today after her provider appt.    Intravenous Access:  Implanted Port.    Treatment Conditions:   Latest Reference Range & Units 11/09/23 10:01   Sodium 135 - 145 mmol/L 143   Potassium 3.4 - 5.3 mmol/L 4.1   Chloride 98 - 107 mmol/L 106   Carbon Dioxide (CO2) 22 - 29 mmol/L 29   Urea Nitrogen 8.0 - 23.0 mg/dL 10.8   Creatinine 0.51 - 0.95 mg/dL 0.79   GFR Estimate >60 mL/min/1.73m2 82   Calcium 8.8 - 10.2 mg/dL 9.8   Anion Gap 7 - 15 mmol/L 8   Albumin 3.5 - 5.2 g/dL 4.1   Protein Total 6.4 - 8.3 g/dL 7.6   Alkaline Phosphatase 35 - 104 U/L 52   ALT 0 - 50 U/L 52 (H)   AST 0 - 45 U/L 53 (H)   Bilirubin Total <=1.2 mg/dL 0.3   Glucose 70 - 99 mg/dL 111 (H)   WBC 4.0 - 11.0 10e3/uL 6.3   Hemoglobin 11.7 - 15.7 g/dL 12.8   Hematocrit 35.0 - 47.0 % 39.7   Platelet Count 150 - 450 10e3/uL 210   RBC Count 3.80 - 5.20 10e6/uL 4.30   MCV 78 - 100 fL 92   MCH 26.5 - 33.0 pg 29.8   MCHC 31.5 - 36.5 g/dL 32.2   RDW 10.0 - 15.0 % 13.2   % Neutrophils % 51   % Lymphocytes % 40   % Monocytes % 5   % Eosinophils % 3   % Basophils % 1   Absolute Basophils 0.0 - 0.2 10e3/uL 0.0   Absolute Eosinophils 0.0 - 0.7 10e3/uL 0.2   Absolute Immature Granulocytes <=0.4 10e3/uL 0.0   Absolute Lymphocytes 0.8 - 5.3 10e3/uL 2.5   Absolute Monocytes 0.0 - 1.3 10e3/uL 0.3   % Immature Granulocytes % 0   Absolute Neutrophils 1.6 - 8.3 10e3/uL 3.2   Absolute NRBCs 10e3/uL 0.0   NRBCs per 100 WBC <1 /100 0     ECHO last performed 10/19/23 EF 60-65%    Results reviewed, labs MET treatment parameters, ok to proceed with treatment.    Post Infusion Assessment:  Patient tolerated infusion without incident.  Patient tolerated injection in right arm without incident.  Blood return  noted pre and post infusion.  Site patent and intact, free from redness, edema or discomfort.  No evidence of extravasations.  Access discontinued per protocol.     Discharge Plan:   Patient declined prescription refills.  Discharge instructions reviewed with: Patient.  Patient and/or family verbalized understanding of discharge instructions and all questions answered.  AVS to patient via [employer].  Patient will return 11/30 for next appointment.   Patient discharged in stable condition accompanied by: self.  Departure Mode: Ambulatory.      Roselyn Chi RN

## 2023-11-09 NOTE — PATIENT INSTRUCTIONS
[facility] Triage and after hours / weekends / holidays:  [phone]    Please call the triage or after hours line if you experience a temperature greater than or equal to 100.4, shaking chills, have uncontrolled nausea, vomiting and/or diarrhea, dizziness, shortness of breath, chest pain, bleeding, unexplained bruising, or if you have any other new/concerning symptoms, questions or concerns.      If you are having any concerning symptoms or wish to speak to a provider before your next infusion visit, please call triage to notify them so we can adequately serve you.     If you need a refill on a narcotic prescription or other medication, please call before your infusion appointment.                November 2023 Sunday Monday Tuesday Wednesday Thursday Friday Saturday                  1     2     3     4       5     6     7     8     9    LAB CENTRAL  10:00 AM   (15 min.)   [facility] LAB DRAW   [facility]    RETURN CCSL  10:15 AM   (45 min.)   Kassidy Robles APRN CNP   [facility]    ONC INFUSION 1 HR (60 MIN)  11:30 AM   (60 min.)    ONC INFUSION NURSE   [facility] 10     11       12     13     14     15     16     17     18       19     20     21     22     23     24     25       26     27     28     29     30    LAB CENTRAL  10:30 AM   (15 min.)   [facility] LAB DRAW   [facility]    ONC INFUSION 1 HR (60 MIN)  11:00 AM   (60 min.)    ONC INFUSION NURSE   [facility]                       December 2023 Sunday Monday Tuesday Wednesday Thursday Friday Saturday                            1     2       3     4     5     6     7     8     9       10     11     12     13     14     15     16       17     18    CT CHEST/ABDOMEN/PELVIS W  10:10 AM   (20 min.)   EICCT1   [facility] 19     20     21    LAB CENTRAL   8:45 AM   (15  min.)   [facility] LAB DRAW   [facility]    RETURN CCSL   8:55 AM   (30 min.)   Lisandro Aguiar MD   [facility]    ONC INFUSION 1 HR (60 MIN)  10:00 AM   (60 min.)    ONC INFUSION NURSE   [facility] 22     23       24     25     26     27     28     29     30       31                                                  Recent Results (from the past 24 hour(s))   COMPREHENSIVE METABOLIC PANEL    Collection Time: 11/09/23 10:01 AM   Result Value Ref Range    Sodium 143 135 - 145 mmol/L    Potassium 4.1 3.4 - 5.3 mmol/L    Carbon Dioxide (CO2) 29 22 - 29 mmol/L    Anion Gap 8 7 - 15 mmol/L    Urea Nitrogen 10.8 8.0 - 23.0 mg/dL    Creatinine 0.79 0.51 - 0.95 mg/dL    GFR Estimate 82 >60 mL/min/1.73m2    Calcium 9.8 8.8 - 10.2 mg/dL    Chloride 106 98 - 107 mmol/L    Glucose 111 (H) 70 - 99 mg/dL    Alkaline Phosphatase 52 35 - 104 U/L    AST 53 (H) 0 - 45 U/L    ALT 52 (H) 0 - 50 U/L    Protein Total 7.6 6.4 - 8.3 g/dL    Albumin 4.1 3.5 - 5.2 g/dL    Bilirubin Total 0.3 <=1.2 mg/dL   CBC with platelets and differential    Collection Time: 11/09/23 10:01 AM   Result Value Ref Range    WBC Count 6.3 4.0 - 11.0 10e3/uL    RBC Count 4.30 3.80 - 5.20 10e6/uL    Hemoglobin 12.8 11.7 - 15.7 g/dL    Hematocrit 39.7 35.0 - 47.0 %    MCV 92 78 - 100 fL    MCH 29.8 26.5 - 33.0 pg    MCHC 32.2 31.5 - 36.5 g/dL    RDW 13.2 10.0 - 15.0 %    Platelet Count 210 150 - 450 10e3/uL    % Neutrophils 51 %    % Lymphocytes 40 %    % Monocytes 5 %    % Eosinophils 3 %    % Basophils 1 %    % Immature Granulocytes 0 %    NRBCs per 100 WBC 0 <1 /100    Absolute Neutrophils 3.2 1.6 - 8.3 10e3/uL    Absolute Lymphocytes 2.5 0.8 - 5.3 10e3/uL    Absolute Monocytes 0.3 0.0 - 1.3 10e3/uL    Absolute Eosinophils 0.2 0.0 - 0.7 10e3/uL    Absolute Basophils 0.0 0.0 - 0.2 10e3/uL    Absolute Immature Granulocytes 0.0 <=0.4 10e3/uL    Absolute NRBCs 0.0  10e3/uL

## 2023-11-09 NOTE — NURSING NOTE
"Oncology Rooming Note    November 9, 2023 10:28 AM   Hoda Brush is a 68 year old female who presents for:    Chief Complaint   Patient presents with    Port Draw     Port accessed with 20 gauge 3/4 inch flat needle by RN, labs collected, line flushed with saline and heparin.  Vitals taken. Pt checked in for appointment(s).     Oncology Clinic Visit     Malignant neoplasm of left female breast, unspecified estrogen receptor status, unspecified site of breast     Initial Vitals: /70   Pulse 82   Temp 98.4  F (36.9  C)   Resp 16   Wt 83 kg (182 lb 14.4 oz)   SpO2 97%   BMI 33.23 kg/m   Estimated body mass index is 33.23 kg/m  as calculated from the following:    Height as of 7/11/23: 1.58 m (5' 2.21\").    Weight as of this encounter: 83 kg (182 lb 14.4 oz). Body surface area is 1.91 meters squared.  No Pain (0) Comment: Data Unavailable   No LMP recorded. Patient is postmenopausal.  Allergies reviewed: Yes  Medications reviewed: Yes    Medications: Medication refills not needed today.  Pharmacy name entered into [employer]: [id number] - [place], [place] - [address]    Clinical concerns: none.       Naun Sena"

## 2023-11-09 NOTE — NURSING NOTE
Chief Complaint   Patient presents with    Port Draw     Port accessed with 20 gauge 3/4 inch flat needle by RN, labs collected, line flushed with saline and heparin.  Vitals taken. Pt checked in for appointment(s).      Kallie Aguilera RN

## 2023-11-09 NOTE — LETTER
11/9/2023         RE: Hoda Brush  [address]  [facility] [address]        Dear Colleague,    Thank you for referring your patient, Hoda Brush, to the [facility]. Please see a copy of my visit note below.    Nov 9, 2023    ONCOLOGY RETURN VISIT:     Hoda is a patient from [place] and is seen here for continuation of care for her metastatic ER+HER2- breast cancer.  She is a refugee from [place] and was initially seen in clinic with her daughter.  The only data we have from [facility] is an English translation of her records.       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  On 02/11/2014, she was seen by an oncologist.  The clinical staging of T4b N2 M1 was noted.   Her breast cancer was on the left side. There was no right breast cancer, confirmed by the patient and her daughter, correcting a possible error in the translated records.  ER was positive in 100% of the cells, AL at 14% and HER2 was 3+ positive.  It appears that this histopathologic information is on the mastectomy specimen. She underwent an MRI for staging of presumptive bone metastases which was performed 03/02/2014.  There were skeletal metastases in the thoracic vertebrae at 12, L1, L3, L5 and S1 vertebral body, ranging in size from 0.7-3.0 cm in size.  Ischial and right femur were also involved, as well as a large iliac mass measuring about 15 cm in the uterus. 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   Radiation Oncology consultation was performed 03/11/2014, and she was given radiation in 1 dose of 6 Gy to the right iliac lesion.        With the initial diagnosis, she initiated treatment with 6 cycles of CAF neoadjuvant chemotherapy 02/14, 07/07, 03/28, 04/18/14, 05/08 and 05/29/14. She also received monthly zoledronic acid.  She then underwent a modified left mastectomy of Palacios type and left  lymphadenoectomy on 06/27/2014.   Pathologic examination showed number at [facility] was [id number] showed infiltrative grade 2 ductal cancer with 6 level 1 metastatic lymph nodes and 3 level 2 metastatic lymph nodes.  The differentiation was intermediate.  The tumor was ER positive 70% of the cells, ID positive in 40% of the cells and HER2 was 3+ by immunohistochemistry and the Ki-67 labeling index was 20%. Her staging after surgery was stage IV, pT4b N2 M1.  I don't see a biopsy of the skeletal metastases.  She then had continuation with chemotherapy with 4 cycles of Herceptin and taxane with monthly zoledronic acid.  Tamoxifen was initiated.  She then had two years of Herceptin and a decision was made not to continue further Herceptin.  She continued on zoledronic acid every 3 months. She was clinically stable. [place] then [place] the [place] new refugee from [facility].  She has now been changed to letrozole.  Denosumab was held for new diagnosis of osteonecrosis.     History of cholecystectomy 2020.      TREATMENT HISTORY:  A. Initial diagnosis with metastatic breast cancer in [facility].  Neoadjuvant CAF x 6.   B. Left mastectomy. Left axillary node dissection.  C. Radiation in 1 dose to R iliac region.  C. Herceptin for 2 years taxane for a prescribed course then stopped, monthly zoledronic acid.  She had 2 years of Herceptin with tamoxifen added after chemotherapy.  D. Tamoxifen alone and zoledronic acid every 3 months starting 2014.  Letrozole was started   E. [place]  We restarted Herceptin every 3 weeks and continued tamoxifen. Bone targeted agent changed to denosumab every 9 weeks. Zometa discontinued 2017.  Tamoxifen was changed to letrozole August 2019.    F. Xgeva discontinued 12-17-19 because of dental issues.   G. J+J vaccine March, 2021.  H. Was on Zometa once May 11.  Reaction with eye lid swelling. Discontinued Zometa.  H. Restart Xgeva 6-22-21.  Continuing the trastuzumab and letrozole.  Both  tolerated well.       INTERVAL HISTORY:  Hdoa returns to clinic today and is feeling well. She has no new concerns today.  She denies any new aches or bony pain.  She continues to stay active, walking daily.  She does have a treadmill at home for walking in the winter.  She reports that her energy is good.  She denies fevers or chills or signs of systemic illness.  She denier drenching night sweats or unexplained weight loss.  She denies headache, dizziness or vision changes.  She denies cough, chest pain, or shortness of breath at rest.  She denies nausea, vomiting, abdominal pain or bowel changes.  She continues on trastuzumab, letrozole and Xgeva, and is tolerating these well.  She has no new dental concerns today.    ROS: 12 point ROS neg other than the symptoms noted above in the HPI.    PHYSICAL EXAMINATION:    /70   Pulse 82   Temp 98.4  F (36.9  C)   Resp 16   Wt 83 kg (182 lb 14.4 oz)   SpO2 97%   BMI 33.23 kg/m    General: No acute distress  HEENT: Sclera anicteric.   Lymph: No cervical or supraclavicular lymphadenopathy noted.  Heart: Regular, rate, and rhythm.  Lungs: Clear to ascultation bilaterally.  Abdomen: Positive bowel sounds. Soft, non-distended, non-tender.   Extremities: No lower extremity edema  Neuro: Cranial nerves grossly intact  Rash: None noted on visible skin.     LABORATORY DATA:   Most Recent 3 CBC's:  Recent Labs   Lab Test 11/09/23  1001 10/19/23  1223 09/28/23  0955   WBC 6.3 6.6 6.2   HGB 12.8 12.4 12.4   MCV 92 93 94    205 206   ANEUTAUTO 3.2 3.0 3.2     Most Recent 3 BMP's:  Recent Labs   Lab Test 11/09/23  1001 10/19/23  1223 09/28/23  0955    141 142   POTASSIUM 4.1 4.3 4.2   CHLORIDE 106 103 106   CO2 29 28 28   BUN 10.8 15.2 11.7   CR 0.79 0.81 0.80   ANIONGAP 8 10 8   TAYLER 9.8 9.5 8.7*   * 92 96   PROTTOTAL 7.6 7.6 7.5   ALBUMIN 4.1 4.2 4.1    Most Recent 3 LFT's:  Recent Labs   Lab Test 11/09/23  1001 10/19/23  1223 09/28/23  0955   AST 53*  56* 51*   ALT 52* 53* 53*   ALKPHOS 52 49 48   BILITOTAL 0.3 0.4 0.3    Most Recent 2 TSH and T4:  Recent Labs   Lab Test 07/06/23  1216 12/09/21  1438   TSH 0.77 1.01     IMAGING:  Echocardiogram with two-dimensional, color and spectral Doppler performed 10/19/2023  ______________________________________________________________________________  Interpretation Summary  Global and regional left ventricular function is normal with an EF of 60-65%.  Right ventricular function, chamber size, wall motion, and thickness are  normal.  No significant valvular abnormalities present.     This study was compared with the study from 7/2023 .  No significant changes noted.    I reviewed the above labs and imaging today.     ASSESSMENT AND PLAN:       Hoda Brush is a 68-year-old woman with a history of metastatic ER-positive, PA-positive, HER2 positive breast cancer to the bones.  She is from [facility], formally from [facility], and came to live in the [place] with [place].   She had metastatic disease at the time of presentation with bone-only metastases by report.  She underwent neoadjuvant CAF, had a left mastectomy, left axillary lymph node dissection, radiation to the right hip, which included the right iliac region where she had metastatic disease.  She was initially on tamoxifen but then was switched to letrozole.  She continues on this and every 3-week trastuzumab.  She has had no evidence of disease progression for the last 3 years.  Markers are low and stable, pending today.  We have continued Herceptin every 3 weeks as well as daily letrozole. CT CAP shows stable pulmonary nodules and stable sclerotic bone metastasis.   - Labs and clinically appropriate to continue herceptin and letrozole.   - 10/2023 echo overall stable with EF of 55%, unchanged from 07/2023 study.   - Continue CT CAP every 4 months, scheduled for 12/18/2023.    Bone Metastasis   - Hx of R maxillary osteonecrosis has healed. Cleared to  restart Xgeva. Will continue every 3 months and administered today.  Per patient recent dental exam 08/16/23 with no concerns and no changes noted since that time.  - R hip discomfort reported in her August visit improved overall.  She now only experiences this as mild discomfort when standing for long periods.  Resolves with position changes.  She will call with any new concerns.      ALT/AST elevation  - AST and ALT mildly elevated.   - Per chart review she has had intermittent elevations over the last several years.   - Most recent CT 8/16/2023 with no concerning findings; does have hepatic steatosis and is s/p cholecystectomy in 2020.   - No dose adjustments needed at this time.  - Medications reviewed and discussed with patient.  She has not taken anything new.   - Continue to closely monitor.    Follow up   - Continue herceptin and labs every three weeks with provider visits every 6 weeks.     KATHI Valencia, CNP  [facility]  [address] [place], [place] [id number] [phone]

## 2023-11-09 NOTE — PROGRESS NOTES
Nov 9, 2023    ONCOLOGY RETURN VISIT:     Hoda is a patient from [facility] and is seen here for continuation of care for her metastatic ER+HER2- breast cancer.  She is a refugee from [facility] and was initially seen in clinic with her daughter.  The only data we have from [facility] is an English translation of her records.       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  On 02/11/2014, she was seen by an oncologist.  The clinical staging of T4b N2 M1 was noted.   Her breast cancer was on the left side. There was no right breast cancer, confirmed by the patient and her daughter, correcting a possible error in the translated records.  ER was positive in 100% of the cells, TN at 14% and HER2 was 3+ positive.  It appears that this histopathologic information is on the mastectomy specimen. She underwent an MRI for staging of presumptive bone metastases which was performed 03/02/2014.  There were skeletal metastases in the thoracic vertebrae at 12, L1, L3, L5 and S1 vertebral body, ranging in size from 0.7-3.0 cm in size.  Ischial and right femur were also involved, as well as a large iliac mass measuring about 15 cm in the uterus. 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   Radiation Oncology consultation was performed 03/11/2014, and she was given radiation in 1 dose of 6 Gy to the right iliac lesion.        With the initial diagnosis, she initiated treatment with 6 cycles of CAF neoadjuvant chemotherapy 02/14, 07/07, 03/28, 04/18/14, 05/08 and 05/29/14. She also received monthly zoledronic acid.  She then underwent a modified left mastectomy of Palacios type and left lymphadenoectomy on 06/27/2014.   Pathologic examination showed number at [facility] was [id number] showed infiltrative grade 2 ductal cancer with 6 level 1 metastatic lymph nodes and 3 level 2 metastatic lymph nodes.  The differentiation was intermediate.  The tumor was ER  positive 70% of the cells, ID positive in 40% of the cells and HER2 was 3+ by immunohistochemistry and the Ki-67 labeling index was 20%. Her staging after surgery was stage IV, pT4b N2 M1.  I don't see a biopsy of the skeletal metastases.  She then had continuation with chemotherapy with 4 cycles of Herceptin and taxane with monthly zoledronic acid.  Tamoxifen was initiated.  She then had two years of Herceptin and a decision was made not to continue further Herceptin.  She continued on zoledronic acid every 3 months. She was clinically stable. [place] then [place] the [place] new refugee from [facility].  She has now been changed to letrozole.  Denosumab was held for new diagnosis of osteonecrosis.     History of cholecystectomy 2020.      TREATMENT HISTORY:  A. Initial diagnosis with metastatic breast cancer in [facility].  Neoadjuvant CAF x 6.   B. Left mastectomy. Left axillary node dissection.  C. Radiation in 1 dose to R iliac region.  C. Herceptin for 2 years taxane for a prescribed course then stopped, monthly zoledronic acid.  She had 2 years of Herceptin with tamoxifen added after chemotherapy.  D. Tamoxifen alone and zoledronic acid every 3 months starting 2014.  Letrozole was started   [place]  We restarted Herceptin every 3 weeks and continued tamoxifen. Bone targeted agent changed to denosumab every 9 weeks. Zometa discontinued 2017.  Tamoxifen was changed to letrozole August 2019.    F. Xgeva discontinued 12-17-19 because of dental issues.   G. J+J vaccine March, 2021.  H. Was on Zometa once May 11.  Reaction with eye lid swelling. Discontinued Zometa.  H. Restart Xgeva 6-22-21.  Continuing the trastuzumab and letrozole.  Both tolerated well.       INTERVAL HISTORY:  Hoda returns to clinic today and is feeling well. She has no new concerns today.  She denies any new aches or bony pain.  She continues to stay active, walking daily.  She does have a treadmill at home for walking in the winter.  She  reports that her energy is good.  She denies fevers or chills or signs of systemic illness.  She denier drenching night sweats or unexplained weight loss.  She denies headache, dizziness or vision changes.  She denies cough, chest pain, or shortness of breath at rest.  She denies nausea, vomiting, abdominal pain or bowel changes.  She continues on trastuzumab, letrozole and Xgeva, and is tolerating these well.  She has no new dental concerns today.    ROS: 12 point ROS neg other than the symptoms noted above in the HPI.    PHYSICAL EXAMINATION:    /70   Pulse 82   Temp 98.4  F (36.9  C)   Resp 16   Wt 83 kg (182 lb 14.4 oz)   SpO2 97%   BMI 33.23 kg/m    General: No acute distress  HEENT: Sclera anicteric.   Lymph: No cervical or supraclavicular lymphadenopathy noted.  Heart: Regular, rate, and rhythm.  Lungs: Clear to ascultation bilaterally.  Abdomen: Positive bowel sounds. Soft, non-distended, non-tender.   Extremities: No lower extremity edema  Neuro: Cranial nerves grossly intact  Rash: None noted on visible skin.     LABORATORY DATA:   Most Recent 3 CBC's:  Recent Labs   Lab Test 11/09/23  1001 10/19/23  1223 09/28/23  0955   WBC 6.3 6.6 6.2   HGB 12.8 12.4 12.4   MCV 92 93 94    205 206   ANEUTAUTO 3.2 3.0 3.2     Most Recent 3 BMP's:  Recent Labs   Lab Test 11/09/23  1001 10/19/23  1223 09/28/23  0955    141 142   POTASSIUM 4.1 4.3 4.2   CHLORIDE 106 103 106   CO2 29 28 28   BUN 10.8 15.2 11.7   CR 0.79 0.81 0.80   ANIONGAP 8 10 8   TAYLER 9.8 9.5 8.7*   * 92 96   PROTTOTAL 7.6 7.6 7.5   ALBUMIN 4.1 4.2 4.1    Most Recent 3 LFT's:  Recent Labs   Lab Test 11/09/23  1001 10/19/23  1223 09/28/23  0955   AST 53* 56* 51*   ALT 52* 53* 53*   ALKPHOS 52 49 48   BILITOTAL 0.3 0.4 0.3    Most Recent 2 TSH and T4:  Recent Labs   Lab Test 07/06/23  1216 12/09/21  1438   TSH 0.77 1.01     IMAGING:  Echocardiogram with two-dimensional, color and spectral Doppler performed  10/19/2023  ______________________________________________________________________________  Interpretation Summary  Global and regional left ventricular function is normal with an EF of 60-65%.  Right ventricular function, chamber size, wall motion, and thickness are  normal.  No significant valvular abnormalities present.     This study was compared with the study from 7/2023 .  No significant changes noted.    I reviewed the above labs and imaging today.     ASSESSMENT AND PLAN:       Hoda Brush is a 68-year-old woman with a history of metastatic ER-positive, DC-positive, HER2 positive breast cancer to the bones.  She is from [facility], formally from [facility], and came to live in the [place] with [place].   She had metastatic disease at the time of presentation with bone-only metastases by report.  She underwent neoadjuvant CAF, had a left mastectomy, left axillary lymph node dissection, radiation to the right hip, which included the right iliac region where she had metastatic disease.  She was initially on tamoxifen but then was switched to letrozole.  She continues on this and every 3-week trastuzumab.  She has had no evidence of disease progression for the last 3 years.  Markers are low and stable, pending today.  We have continued Herceptin every 3 weeks as well as daily letrozole. CT CAP shows stable pulmonary nodules and stable sclerotic bone metastasis.   - Labs and clinically appropriate to continue herceptin and letrozole.   - 10/2023 echo overall stable with EF of 55%, unchanged from 07/2023 study.   - Continue CT CAP every 4 months, scheduled for 12/18/2023.    Bone Metastasis   - Hx of R maxillary osteonecrosis has healed. Cleared to restart Xgeva. Will continue every 3 months and administered today.  Per patient recent dental exam 08/16/23 with no concerns and no changes noted since that time.  - R hip discomfort reported in her August visit improved overall.  She now only experiences  this as mild discomfort when standing for long periods.  Resolves with position changes.  She will call with any new concerns.      ALT/AST elevation  - AST and ALT mildly elevated.   - Per chart review she has had intermittent elevations over the last several years.   - Most recent CT 8/16/2023 with no concerning findings; does have hepatic steatosis and is s/p cholecystectomy in 2020.   - No dose adjustments needed at this time.  - Medications reviewed and discussed with patient.  She has not taken anything new.   - Continue to closely monitor.    Follow up   - Continue herceptin and labs every three weeks with provider visits every 6 weeks.     Kassidy Robles, APRN, CNP  [facility]  [address] [place] [address] [phone]

## 2023-11-10 LAB — CANCER AG27-29 SERPL-ACNC: 13.5 U/ML

## 2023-11-19 ENCOUNTER — TRANSFERRED RECORDS (OUTPATIENT)
Dept: HEALTH INFORMATION MANAGEMENT | Facility: CLINIC | Age: 68
End: 2023-11-19

## 2023-11-30 ENCOUNTER — APPOINTMENT (OUTPATIENT)
Dept: LAB | Facility: CLINIC | Age: 68
End: 2023-11-30
Attending: INTERNAL MEDICINE
Payer: COMMERCIAL

## 2023-11-30 ENCOUNTER — INFUSION THERAPY VISIT (OUTPATIENT)
Dept: ONCOLOGY | Facility: CLINIC | Age: 68
End: 2023-11-30
Attending: INTERNAL MEDICINE
Payer: COMMERCIAL

## 2023-11-30 VITALS
BODY MASS INDEX: 33.03 KG/M2 | DIASTOLIC BLOOD PRESSURE: 72 MMHG | RESPIRATION RATE: 18 BRPM | TEMPERATURE: 98.1 F | HEART RATE: 88 BPM | WEIGHT: 181.8 LBS | OXYGEN SATURATION: 96 % | SYSTOLIC BLOOD PRESSURE: 113 MMHG

## 2023-11-30 DIAGNOSIS — C50.912 MALIGNANT NEOPLASM OF LEFT FEMALE BREAST, UNSPECIFIED ESTROGEN RECEPTOR STATUS, UNSPECIFIED SITE OF BREAST (H): Primary | ICD-10-CM

## 2023-11-30 LAB
ALBUMIN SERPL BCG-MCNC: 4.1 G/DL (ref 3.5–5.2)
ALP SERPL-CCNC: 48 U/L (ref 40–150)
ALT SERPL W P-5'-P-CCNC: 60 U/L (ref 0–50)
ANION GAP SERPL CALCULATED.3IONS-SCNC: 9 MMOL/L (ref 7–15)
AST SERPL W P-5'-P-CCNC: 57 U/L (ref 0–45)
BASOPHILS # BLD AUTO: 0 10E3/UL (ref 0–0.2)
BASOPHILS NFR BLD AUTO: 0 %
BILIRUB SERPL-MCNC: 0.3 MG/DL
BUN SERPL-MCNC: 13.5 MG/DL (ref 8–23)
CALCIUM SERPL-MCNC: 9.8 MG/DL (ref 8.8–10.2)
CEA SERPL-MCNC: 1 NG/ML
CHLORIDE SERPL-SCNC: 105 MMOL/L (ref 98–107)
CREAT SERPL-MCNC: 0.79 MG/DL (ref 0.51–0.95)
DEPRECATED HCO3 PLAS-SCNC: 28 MMOL/L (ref 22–29)
EGFRCR SERPLBLD CKD-EPI 2021: 82 ML/MIN/1.73M2
EOSINOPHIL # BLD AUTO: 0.3 10E3/UL (ref 0–0.7)
EOSINOPHIL NFR BLD AUTO: 4 %
ERYTHROCYTE [DISTWIDTH] IN BLOOD BY AUTOMATED COUNT: 13.8 % (ref 10–15)
GLUCOSE SERPL-MCNC: 86 MG/DL (ref 70–99)
HCT VFR BLD AUTO: 40.4 % (ref 35–47)
HGB BLD-MCNC: 13.1 G/DL (ref 11.7–15.7)
IMM GRANULOCYTES # BLD: 0 10E3/UL
IMM GRANULOCYTES NFR BLD: 0 %
LYMPHOCYTES # BLD AUTO: 2.9 10E3/UL (ref 0.8–5.3)
LYMPHOCYTES NFR BLD AUTO: 39 %
MCH RBC QN AUTO: 30.1 PG (ref 26.5–33)
MCHC RBC AUTO-ENTMCNC: 32.4 G/DL (ref 31.5–36.5)
MCV RBC AUTO: 93 FL (ref 78–100)
MONOCYTES # BLD AUTO: 0.5 10E3/UL (ref 0–1.3)
MONOCYTES NFR BLD AUTO: 6 %
NEUTROPHILS # BLD AUTO: 3.8 10E3/UL (ref 1.6–8.3)
NEUTROPHILS NFR BLD AUTO: 51 %
NRBC # BLD AUTO: 0 10E3/UL
NRBC BLD AUTO-RTO: 0 /100
PLATELET # BLD AUTO: 181 10E3/UL (ref 150–450)
POTASSIUM SERPL-SCNC: 4.3 MMOL/L (ref 3.4–5.3)
PROT SERPL-MCNC: 7.6 G/DL (ref 6.4–8.3)
RBC # BLD AUTO: 4.35 10E6/UL (ref 3.8–5.2)
SODIUM SERPL-SCNC: 142 MMOL/L (ref 135–145)
WBC # BLD AUTO: 7.5 10E3/UL (ref 4–11)

## 2023-11-30 PROCEDURE — 85025 COMPLETE CBC W/AUTO DIFF WBC: CPT | Performed by: INTERNAL MEDICINE

## 2023-11-30 PROCEDURE — 86300 IMMUNOASSAY TUMOR CA 15-3: CPT | Performed by: INTERNAL MEDICINE

## 2023-11-30 PROCEDURE — 80053 COMPREHEN METABOLIC PANEL: CPT | Performed by: INTERNAL MEDICINE

## 2023-11-30 PROCEDURE — 82378 CARCINOEMBRYONIC ANTIGEN: CPT | Performed by: INTERNAL MEDICINE

## 2023-11-30 PROCEDURE — 96413 CHEMO IV INFUSION 1 HR: CPT

## 2023-11-30 PROCEDURE — 36591 DRAW BLOOD OFF VENOUS DEVICE: CPT | Performed by: INTERNAL MEDICINE

## 2023-11-30 PROCEDURE — 250N000011 HC RX IP 250 OP 636: Mod: JZ | Performed by: INTERNAL MEDICINE

## 2023-11-30 PROCEDURE — 258N000003 HC RX IP 258 OP 636: Performed by: INTERNAL MEDICINE

## 2023-11-30 RX ORDER — HEPARIN SODIUM (PORCINE) LOCK FLUSH IV SOLN 100 UNIT/ML 100 UNIT/ML
5 SOLUTION INTRAVENOUS EVERY 8 HOURS
Status: DISCONTINUED | OUTPATIENT
Start: 2023-11-30 | End: 2023-11-30 | Stop reason: HOSPADM

## 2023-11-30 RX ORDER — HEPARIN SODIUM (PORCINE) LOCK FLUSH IV SOLN 100 UNIT/ML 100 UNIT/ML
5 SOLUTION INTRAVENOUS ONCE
Status: COMPLETED | OUTPATIENT
Start: 2023-11-30 | End: 2023-11-30

## 2023-11-30 RX ADMIN — Medication 5 ML: at 11:04

## 2023-11-30 RX ADMIN — SODIUM CHLORIDE 250 ML: 9 INJECTION, SOLUTION INTRAVENOUS at 11:52

## 2023-11-30 RX ADMIN — TRASTUZUMAB 450 MG: 150 INJECTION, POWDER, LYOPHILIZED, FOR SOLUTION INTRAVENOUS at 12:49

## 2023-11-30 RX ADMIN — Medication 5 ML: at 13:23

## 2023-11-30 ASSESSMENT — PAIN SCALES - GENERAL: PAINLEVEL: NO PAIN (0)

## 2023-11-30 NOTE — NURSING NOTE
Vital signs and weight obtained. Denies pain. Port accessed using sterile technique. Brisk blood return noted. Labs drawn as ordered. Port saline flushed and heparin locked. Patient checked in to next appointment.

## 2023-11-30 NOTE — PROGRESS NOTES
Infusion Nursing Note:  Hoda Brush presents today for Cycle 107 Day 1 Trastuzumab.    Patient spoke with provider today: No    Treatment Conditions:  ECHO/MUGA completed 10/19/23  EF 60-65%.    Component      Latest Ref Rng 11/30/2023  11:05 AM   WBC      4.0 - 11.0 10e3/uL 7.5    RBC Count      3.80 - 5.20 10e6/uL 4.35    Hemoglobin      11.7 - 15.7 g/dL 13.1    Hematocrit      35.0 - 47.0 % 40.4    MCV      78 - 100 fL 93    MCH      26.5 - 33.0 pg 30.1    MCHC      31.5 - 36.5 g/dL 32.4    RDW      10.0 - 15.0 % 13.8    Platelet Count      150 - 450 10e3/uL 181    % Neutrophils      % 51    % Lymphocytes      % 39    % Monocytes      % 6    % Eosinophils      % 4    % Basophils      % 0    % Immature Granulocytes      % 0    NRBCs per 100 WBC      <1 /100 0    Absolute Neutrophils      1.6 - 8.3 10e3/uL 3.8    Absolute Lymphocytes      0.8 - 5.3 10e3/uL 2.9    Absolute Monocytes      0.0 - 1.3 10e3/uL 0.5    Absolute Eosinophils      0.0 - 0.7 10e3/uL 0.3    Absolute Basophils      0.0 - 0.2 10e3/uL 0.0    Absolute Immature Granulocytes      <=0.4 10e3/uL 0.0    Absolute NRBCs      10e3/uL 0.0    Sodium      135 - 145 mmol/L 142    Potassium      3.4 - 5.3 mmol/L 4.3    Carbon Dioxide (CO2)      22 - 29 mmol/L 28    Anion Gap      7 - 15 mmol/L 9    Urea Nitrogen      8.0 - 23.0 mg/dL 13.5    Creatinine      0.51 - 0.95 mg/dL 0.79    GFR Estimate      >60 mL/min/1.73m2 82    Calcium      8.8 - 10.2 mg/dL 9.8    Chloride      98 - 107 mmol/L 105    Glucose      70 - 99 mg/dL 86    Alkaline Phosphatase      40 - 150 U/L 48    AST      0 - 45 U/L 57 (H)    ALT      0 - 50 U/L 60 (H)    Protein Total      6.4 - 8.3 g/dL 7.6    Albumin      3.5 - 5.2 g/dL 4.1    Bilirubin Total      <=1.2 mg/dL 0.3       Legend:  (H) High      Note: Pt with no concerns. Denies fever/chills, SOB or cough.    Intravenous Access:  Implanted Port.    Post Infusion Assessment:  Patient tolerated infusion without incident.  Blood  return noted pre and post infusion.  Access discontinued per protocol.    Discharge Plan:   Patient declined prescription refills.  Discharge instructions reviewed with: Patient.  Patient and/or family verbalized understanding of discharge instructions and all questions answered.  AVS to patient via [employer].  Patient will return 12/18/23 for next appointment.   Patient discharged in stable condition accompanied by: self.  Departure Mode: Ambulatory.    Linda Navarro RN

## 2023-12-02 LAB — CANCER AG27-29 SERPL-ACNC: <9 U/ML

## 2023-12-18 ENCOUNTER — ANCILLARY PROCEDURE (OUTPATIENT)
Dept: CT IMAGING | Facility: CLINIC | Age: 68
End: 2023-12-18
Attending: INTERNAL MEDICINE
Payer: COMMERCIAL

## 2023-12-18 DIAGNOSIS — C50.912 MALIGNANT NEOPLASM OF LEFT FEMALE BREAST, UNSPECIFIED ESTROGEN RECEPTOR STATUS, UNSPECIFIED SITE OF BREAST (H): ICD-10-CM

## 2023-12-18 PROCEDURE — 250N000011 HC RX IP 250 OP 636: Performed by: INTERNAL MEDICINE

## 2023-12-18 PROCEDURE — 250N000009 HC RX 250: Performed by: INTERNAL MEDICINE

## 2023-12-18 PROCEDURE — 74177 CT ABD & PELVIS W/CONTRAST: CPT

## 2023-12-18 RX ORDER — IOPAMIDOL 755 MG/ML
90 INJECTION, SOLUTION INTRAVASCULAR ONCE
Status: COMPLETED | OUTPATIENT
Start: 2023-12-18 | End: 2023-12-18

## 2023-12-18 RX ORDER — HEPARIN SODIUM (PORCINE) LOCK FLUSH IV SOLN 100 UNIT/ML 100 UNIT/ML
5 SOLUTION INTRAVENOUS ONCE
Status: COMPLETED | OUTPATIENT
Start: 2023-12-18 | End: 2023-12-18

## 2023-12-18 RX ADMIN — HEPARIN SODIUM (PORCINE) LOCK FLUSH IV SOLN 100 UNIT/ML 5 ML: 100 SOLUTION at 10:22

## 2023-12-18 RX ADMIN — IOPAMIDOL 90 ML: 755 INJECTION, SOLUTION INTRAVENOUS at 10:22

## 2023-12-18 RX ADMIN — SODIUM CHLORIDE 40 ML: 9 INJECTION, SOLUTION INTRAVENOUS at 10:22

## 2024-01-07 NOTE — PROGRESS NOTES
Hoda is a patient from [facility] and is seen here for continuation of care for her metastatic ER+HER2- breast cancer.  She is a refugee from [facility] and was initially seen in clinic with her daughter.  The only data we have from [facility] is an English translation of her records.       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  On 02/11/2014, she was seen by an oncologist.  The clinical staging of T4b N2 M1 was noted.   Her breast cancer was on the left side. There was no right breast cancer, confirmed by the patient and her daughter, correcting a possible error in the translated records.  ER was positive in 100% of the cells, MI at 14% and HER2 was 3+ positive.  It appears that this histopathologic information is on the mastectomy specimen. She underwent an MRI for staging of presumptive bone metastases which was performed 03/02/2014.  There were skeletal metastases in the thoracic vertebrae at 12, L1, L3, L5 and S1 vertebral body, ranging in size from 0.7-3.0 cm in size.  Ischial and right femur were also involved, as well as a large iliac mass measuring about 15 cm in the uterus. 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   Radiation Oncology consultation was performed 03/11/2014, and she was given radiation in 1 dose of 6 Gy to the right iliac lesion.        With the initial diagnosis, she initiated treatment with 6 cycles of CAF neoadjuvant chemotherapy 02/14, 07/07, 03/28, 04/18/14, 05/08 and 05/29/14. She also received monthly zoledronic acid.  She then underwent a modified left mastectomy of Palacios type and left lymphadenoectomy on 06/27/2014.   Pathologic examination showed number at [facility] was [id number] showed infiltrative grade 2 ductal cancer with 6 level 1 metastatic lympFollow up with Jossie for visits and trastuzumab on 2-5, 2-26, 3-19 with CBC, CMP.  Echocardiogram on 3-19.  Follow up with me 4-9 with CBC,  CMP, CA27.29 and CEA and with CT CAP on 4-8.h nodes and 3 level 2 metastatic lymph nodes.  The differentiation was intermediate.  The tumor was ER positive 70% of the cells, TN positive in 40% of the cells and HER2 was 3+ by immunohistochemistry and the Ki-67 labeling index was 20%. Her staging after surgery was stage IV, pT4b N2 M1.  I don't see a biopsy of the skeletal metastases.  She then had continuation with chemotherapy with 4 cycles of Herceptin and taxane with monthly zoledronic acid.  Tamoxifen was initiated.  She then had two years of Herceptin and a decision was made not to continue further Herceptin.  She continued on zoledronic acid every 3 months. She was clinically stable. [place] then moved to the [place] new refugee from [facility].  She has now been changed to letrozole.  Denosumab has now been held for new diagnosis of osteonecrosis of the R maxilla.     History of cholecystectomy 2020.      TREATMENT HISTORY:  A. Initial diagnosis with metastatic breast cancer in [facility].  Neoadjuvant CAF x 6.   B. Left mastectomy. Left axillary node dissection.  C.  Radiation in 1 dose to R iliac region.  C. Herceptin for 2 years taxane for a prescribed course then stopped, monthly zoledronic acid.  She had 2 years of Herceptin with tamoxifen added after chemotherapy.  D.  Tamoxifen alone and zoledronic acid every 3 months starting 2014.  Letrozole was started   [place]  [place]  We restarted Herceptin every 3 weeks and continued tamoxifen. Bone targeted agent changed to denosumab every 9 weeks. Zometa discontinued 2017.  Tamoxifen was changed to letrozole August 2019.    F.  Xgeva discontinued 12-17-19 because of dental issues.   G.  J+J vaccine March, 2021.  H.  Was on Zometa once May 11.  Reaction with eye lid swelling. Discontinued Zometa.  H.  Restart Xgeva 6-22-21.  Continuing the trastuzumab and letrozole.  Both tolerated well.       INTERVAL HISTORY  She denies pain, fatigue, depression or anxiety.  She  continues on trastuzumab, letrozole and Xgeva.  No pain, mild fatigue, no depression, no anxiety.  She does have an upper respiratory infection for which she is gradually recovering.  She been sick for 2 weeks.  She also had headache associated with the upper respiratory infection and symptoms of a cough and nasal congestion.  She tested node negative for COVID.     REVIEW OF SYSTEMS:  A 10-point review of systems is entirely negative.     She is a eating a Mediterranean-style diet.  She is exercising by swimming.  I did advise adding a weightbearing exercise.  She takes vitamin D3 2000 International Units per day and calcium.  Her last DEXA scan performed a week ago shows a most valid negative T-score of -2.5 in the left hip, consistent with osteoporosis.     Notably, she has had no bowel or bladder problems.     PHYSICAL EXAMINATION:    /72 (BP Location: Right arm, Patient Position: Sitting, Cuff Size: Adult Regular)   Pulse 82   Temp 98.4  F (36.9  C) (Oral)   Resp 16   Wt 81.7 kg (180 lb 3.2 oz)   SpO2 94%   BMI 32.74 kg/m    GENERAL:  Hoda appeared generally well.  HEENT:  She has no alopecia.  Examination of oropharynx reveals good dentition.  LYMPH:  There is no cervical, supraclavicular, subclavicular or axillary lymphadenopathy.  BREASTS: Breast exam deferred today.  LUNGS:  Clear to percussion and auscultation.  CARDIAC:  Heart has a regular rate and rhythm, S1, S2.  ABDOMEN:  Soft, nontender, without hepatosplenomegaly.  EXTREMITIES:  Without edema.  PSYCH:  Mood and affect were normal.     LABORATORY DATA:  CMP and CBC within normal limits.  CEA, CA27.29 pending.      CT Chest/Abdomen/Pelvis 4/12/23  IMPRESSION:  1.  Stable appearance of sclerotic osseous lesions.  2.  Stable small pulmonary nodules.  3.  No new disease in the chest, abdomen and pelvis.        ASSESSMENT AND PLAN:       1.  Hoda Brush is a 68-year-old woman with a history of ER-positive, WA-positive, HER2 positive  breast cancer.  She is from [facility], formally from [facility], and came to live in the [place] with [place] daughter.  The tumor is ER positive, NH positive and HER2 positive.  She had metastatic disease at the time of presentation with bone-only metastases by report.  She underwent neoadjuvant CAF, had a left mastectomy, left axillary lymph node dissection, radiation to the right hip, which included the right iliac region where she had metastatic disease.  She was initially on tamoxifen but then was switched to letrozole.  She continues on this and every 3-week trastuzumab.  She has had no evidence of disease progression for the last 3 years.  Markers are low and stable.  We have continued Herceptin every 3 weeks as well as daily letrozole. CT CAP shows stable pulmonary nodules.   2.  Hoda Brush continues to do well on a combination of trastuzumab and letrozole. She has no evidence of disease progression.   Her ejection fraction remains stable.   She continues to do well on trastuzumab and letrozole.  She has no evidence of disease progression.  She will continue with every 4-month CT of the chest, abdomen and pelvis.    3.   The right maxillary osteonecrosis has healed and she is able to continue with Xgeva every 3 months. Continue with Xgeva every 3 months.  4.  Continue the letrozole and trastuzuamb.  Letrozole has been well-tolerated, as has trastuzuamb.  No significant joint stiffness.  5.  Echo. Stable EF.  60-65%.  6.  Discussion of bone health and right maxillary osteonecrosis.  All healed.   Xgeva is being continued.   7. Follow up.  Continue Herceptin through the end of 2024 alternating providers every 6 weeks. CT CAP every 4 months. Xgeva every 3 months. CBC, CMP every 3 weeks and CA27.29 and CEA every 6 weeks. Echo every 3 months, next on January 23.      Thank you for allowing us to participate in this patient's care.       Sincerely,      Lisandro Aguiar MD  Professor  [employer]   [place]  [phone].        Echo 3 months CT 4 months         I spent 35 minutes with the patient more than 50% of which was in counseling and coordination of care.

## 2024-01-11 ENCOUNTER — INFUSION THERAPY VISIT (OUTPATIENT)
Dept: ONCOLOGY | Facility: CLINIC | Age: 69
End: 2024-01-11
Attending: INTERNAL MEDICINE
Payer: COMMERCIAL

## 2024-01-11 ENCOUNTER — APPOINTMENT (OUTPATIENT)
Dept: LAB | Facility: CLINIC | Age: 69
End: 2024-01-11
Attending: INTERNAL MEDICINE
Payer: COMMERCIAL

## 2024-01-11 VITALS
TEMPERATURE: 98.4 F | SYSTOLIC BLOOD PRESSURE: 108 MMHG | RESPIRATION RATE: 16 BRPM | OXYGEN SATURATION: 94 % | HEART RATE: 82 BPM | DIASTOLIC BLOOD PRESSURE: 72 MMHG | WEIGHT: 180.2 LBS | BODY MASS INDEX: 32.74 KG/M2

## 2024-01-11 DIAGNOSIS — C79.51 MALIGNANT NEOPLASM METASTATIC TO BONE (H): Primary | ICD-10-CM

## 2024-01-11 DIAGNOSIS — C50.912 MALIGNANT NEOPLASM OF LEFT FEMALE BREAST, UNSPECIFIED ESTROGEN RECEPTOR STATUS, UNSPECIFIED SITE OF BREAST (H): ICD-10-CM

## 2024-01-11 DIAGNOSIS — Z51.11 ENCOUNTER FOR ANTINEOPLASTIC CHEMOTHERAPY: ICD-10-CM

## 2024-01-11 DIAGNOSIS — C50.912 MALIGNANT NEOPLASM OF LEFT FEMALE BREAST, UNSPECIFIED ESTROGEN RECEPTOR STATUS, UNSPECIFIED SITE OF BREAST (H): Primary | ICD-10-CM

## 2024-01-11 LAB
ALBUMIN SERPL BCG-MCNC: 4 G/DL (ref 3.5–5.2)
ALP SERPL-CCNC: 48 U/L (ref 40–150)
ALT SERPL W P-5'-P-CCNC: 49 U/L (ref 0–50)
ANION GAP SERPL CALCULATED.3IONS-SCNC: 9 MMOL/L (ref 7–15)
AST SERPL W P-5'-P-CCNC: 40 U/L (ref 0–45)
BASOPHILS # BLD AUTO: 0 10E3/UL (ref 0–0.2)
BASOPHILS NFR BLD AUTO: 0 %
BILIRUB SERPL-MCNC: 0.3 MG/DL
BUN SERPL-MCNC: 13.9 MG/DL (ref 8–23)
CALCIUM SERPL-MCNC: 9.8 MG/DL (ref 8.8–10.2)
CEA SERPL-MCNC: 1 NG/ML
CHLORIDE SERPL-SCNC: 103 MMOL/L (ref 98–107)
CREAT SERPL-MCNC: 0.77 MG/DL (ref 0.51–0.95)
DEPRECATED HCO3 PLAS-SCNC: 29 MMOL/L (ref 22–29)
EGFRCR SERPLBLD CKD-EPI 2021: 84 ML/MIN/1.73M2
EOSINOPHIL # BLD AUTO: 0.3 10E3/UL (ref 0–0.7)
EOSINOPHIL NFR BLD AUTO: 4 %
ERYTHROCYTE [DISTWIDTH] IN BLOOD BY AUTOMATED COUNT: 13.7 % (ref 10–15)
GLUCOSE SERPL-MCNC: 100 MG/DL (ref 70–99)
HCT VFR BLD AUTO: 39.7 % (ref 35–47)
HGB BLD-MCNC: 12.5 G/DL (ref 11.7–15.7)
IMM GRANULOCYTES # BLD: 0 10E3/UL
IMM GRANULOCYTES NFR BLD: 0 %
LYMPHOCYTES # BLD AUTO: 3.4 10E3/UL (ref 0.8–5.3)
LYMPHOCYTES NFR BLD AUTO: 41 %
MCH RBC QN AUTO: 29.6 PG (ref 26.5–33)
MCHC RBC AUTO-ENTMCNC: 31.5 G/DL (ref 31.5–36.5)
MCV RBC AUTO: 94 FL (ref 78–100)
MONOCYTES # BLD AUTO: 0.5 10E3/UL (ref 0–1.3)
MONOCYTES NFR BLD AUTO: 6 %
NEUTROPHILS # BLD AUTO: 4.1 10E3/UL (ref 1.6–8.3)
NEUTROPHILS NFR BLD AUTO: 49 %
NRBC # BLD AUTO: 0 10E3/UL
NRBC BLD AUTO-RTO: 0 /100
PLATELET # BLD AUTO: 240 10E3/UL (ref 150–450)
POTASSIUM SERPL-SCNC: 4.1 MMOL/L (ref 3.4–5.3)
PROT SERPL-MCNC: 7.5 G/DL (ref 6.4–8.3)
RBC # BLD AUTO: 4.22 10E6/UL (ref 3.8–5.2)
SODIUM SERPL-SCNC: 141 MMOL/L (ref 135–145)
WBC # BLD AUTO: 8.5 10E3/UL (ref 4–11)

## 2024-01-11 PROCEDURE — G0463 HOSPITAL OUTPT CLINIC VISIT: HCPCS | Mod: 25 | Performed by: INTERNAL MEDICINE

## 2024-01-11 PROCEDURE — 96372 THER/PROPH/DIAG INJ SC/IM: CPT | Mod: 59 | Performed by: INTERNAL MEDICINE

## 2024-01-11 PROCEDURE — 36591 DRAW BLOOD OFF VENOUS DEVICE: CPT | Performed by: INTERNAL MEDICINE

## 2024-01-11 PROCEDURE — 96413 CHEMO IV INFUSION 1 HR: CPT

## 2024-01-11 PROCEDURE — 250N000011 HC RX IP 250 OP 636: Mod: JZ | Performed by: INTERNAL MEDICINE

## 2024-01-11 PROCEDURE — 85025 COMPLETE CBC W/AUTO DIFF WBC: CPT | Performed by: INTERNAL MEDICINE

## 2024-01-11 PROCEDURE — 258N000003 HC RX IP 258 OP 636: Performed by: INTERNAL MEDICINE

## 2024-01-11 PROCEDURE — 96372 THER/PROPH/DIAG INJ SC/IM: CPT | Performed by: INTERNAL MEDICINE

## 2024-01-11 PROCEDURE — 86300 IMMUNOASSAY TUMOR CA 15-3: CPT | Performed by: INTERNAL MEDICINE

## 2024-01-11 PROCEDURE — 82378 CARCINOEMBRYONIC ANTIGEN: CPT | Performed by: INTERNAL MEDICINE

## 2024-01-11 PROCEDURE — 250N000011 HC RX IP 250 OP 636: Performed by: INTERNAL MEDICINE

## 2024-01-11 PROCEDURE — 80053 COMPREHEN METABOLIC PANEL: CPT | Performed by: INTERNAL MEDICINE

## 2024-01-11 PROCEDURE — 99214 OFFICE O/P EST MOD 30 MIN: CPT | Performed by: INTERNAL MEDICINE

## 2024-01-11 PROCEDURE — 96415 CHEMO IV INFUSION ADDL HR: CPT

## 2024-01-11 RX ORDER — ALBUTEROL SULFATE 0.83 MG/ML
2.5 SOLUTION RESPIRATORY (INHALATION)
Status: CANCELLED | OUTPATIENT
Start: 2024-01-11

## 2024-01-11 RX ORDER — HEPARIN SODIUM (PORCINE) LOCK FLUSH IV SOLN 100 UNIT/ML 100 UNIT/ML
5 SOLUTION INTRAVENOUS EVERY 8 HOURS
Status: DISCONTINUED | OUTPATIENT
Start: 2024-01-11 | End: 2024-01-11 | Stop reason: HOSPADM

## 2024-01-11 RX ORDER — ACETAMINOPHEN 325 MG/1
650 TABLET ORAL
Status: CANCELLED | OUTPATIENT
Start: 2024-01-11

## 2024-01-11 RX ORDER — DIPHENHYDRAMINE HCL 25 MG
50 CAPSULE ORAL ONCE
Status: CANCELLED
Start: 2024-01-11

## 2024-01-11 RX ORDER — HEPARIN SODIUM (PORCINE) LOCK FLUSH IV SOLN 100 UNIT/ML 100 UNIT/ML
500 SOLUTION INTRAVENOUS ONCE
Status: COMPLETED | OUTPATIENT
Start: 2024-01-11 | End: 2024-01-11

## 2024-01-11 RX ORDER — METHYLPREDNISOLONE SODIUM SUCCINATE 125 MG/2ML
125 INJECTION, POWDER, LYOPHILIZED, FOR SOLUTION INTRAMUSCULAR; INTRAVENOUS
Status: CANCELLED
Start: 2024-01-11

## 2024-01-11 RX ORDER — LORAZEPAM 2 MG/ML
0.5 INJECTION INTRAMUSCULAR EVERY 4 HOURS PRN
Status: CANCELLED
Start: 2024-01-11

## 2024-01-11 RX ORDER — ALBUTEROL SULFATE 90 UG/1
1-2 AEROSOL, METERED RESPIRATORY (INHALATION)
Status: CANCELLED
Start: 2024-01-11

## 2024-01-11 RX ORDER — DIPHENHYDRAMINE HYDROCHLORIDE 50 MG/ML
50 INJECTION INTRAMUSCULAR; INTRAVENOUS
Status: CANCELLED
Start: 2024-01-11

## 2024-01-11 RX ORDER — EPINEPHRINE 0.3 MG/.3ML
0.3 INJECTION SUBCUTANEOUS EVERY 5 MIN PRN
Status: CANCELLED | OUTPATIENT
Start: 2024-01-11

## 2024-01-11 RX ORDER — SODIUM CHLORIDE 9 MG/ML
1000 INJECTION, SOLUTION INTRAVENOUS CONTINUOUS PRN
Status: CANCELLED
Start: 2024-01-11

## 2024-01-11 RX ORDER — EPINEPHRINE 1 MG/ML
0.3 INJECTION, SOLUTION INTRAMUSCULAR; SUBCUTANEOUS EVERY 5 MIN PRN
Status: CANCELLED | OUTPATIENT
Start: 2024-01-11

## 2024-01-11 RX ORDER — HEPARIN SODIUM (PORCINE) LOCK FLUSH IV SOLN 100 UNIT/ML 100 UNIT/ML
5 SOLUTION INTRAVENOUS EVERY 8 HOURS
Status: CANCELLED | OUTPATIENT
Start: 2024-01-11

## 2024-01-11 RX ADMIN — TRASTUZUMAB 600 MG: 150 INJECTION, POWDER, LYOPHILIZED, FOR SOLUTION INTRAVENOUS at 10:23

## 2024-01-11 RX ADMIN — Medication 5 ML: at 11:55

## 2024-01-11 RX ADMIN — DENOSUMAB 120 MG: 120 INJECTION SUBCUTANEOUS at 09:46

## 2024-01-11 RX ADMIN — Medication 500 UNITS: at 07:02

## 2024-01-11 RX ADMIN — SODIUM CHLORIDE 250 ML: 9 INJECTION, SOLUTION INTRAVENOUS at 10:22

## 2024-01-11 ASSESSMENT — PAIN SCALES - GENERAL: PAINLEVEL: NO PAIN (0)

## 2024-01-11 NOTE — PATIENT INSTRUCTIONS
[facility] Triage and after hours / weekends / holidays:  [phone]    Please call the triage or after hours line if you experience a temperature greater than or equal to 100.4, shaking chills, have uncontrolled nausea, vomiting and/or diarrhea, dizziness, shortness of breath, chest pain, bleeding, unexplained bruising, or if you have any other new/concerning symptoms, questions or concerns.      If you are having any concerning symptoms or wish to speak to a provider before your next infusion visit, please call triage to notify them so we can adequately serve you.     If you need a refill on a narcotic prescription or other medication, please call before your infusion appointment.

## 2024-01-11 NOTE — NURSING NOTE
"Oncology Rooming Note    January 11, 2024 8:08 AM   Hoda Brush is a 68 year old female who presents for:    Chief Complaint   Patient presents with    Port Draw     Labs drawn via port by RN in lab    Oncology Clinic Visit     Breast CA     Initial Vitals: /72 (BP Location: Right arm, Patient Position: Sitting, Cuff Size: Adult Regular)   Pulse 82   Temp 98.4  F (36.9  C) (Oral)   Resp 16   Wt 81.7 kg (180 lb 3.2 oz)   SpO2 94%   BMI 32.74 kg/m   Estimated body mass index is 32.74 kg/m  as calculated from the following:    Height as of 7/11/23: 1.58 m (5' 2.21\").    Weight as of this encounter: 81.7 kg (180 lb 3.2 oz). Body surface area is 1.89 meters squared.  No Pain (0) Comment: Data Unavailable   No LMP recorded. Patient is postmenopausal.  Allergies reviewed: Yes  Medications reviewed: Yes    Medications: Medication refills not needed today.  Pharmacy name entered into [employer]: [facility] #[id number] - [place], [place] - [address]    Frailty Screening:   Is the patient here for a new oncology consult visit in cancer care? 2. No      Clinical concerns:        Marycarmen Moe              "

## 2024-01-11 NOTE — LETTER
1/11/2024         RE: Hoda Brush  [address]  [place] [address]        Dear Colleague,    Thank you for referring your patient, Hoda Brush, to the [facility]. Please see a copy of my visit note below.    Hoda is a patient from [facility] and is seen here for continuation of care for her metastatic ER+HER2- breast cancer.  She is a refugee from [facility] and was initially seen in clinic with her daughter.  The only data we have from [facility] is an English translation of her records.       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  On 02/11/2014, she was seen by an oncologist.  The clinical staging of T4b N2 M1 was noted.   Her breast cancer was on the left side. There was no right breast cancer, confirmed by the patient and her daughter, correcting a possible error in the translated records.  ER was positive in 100% of the cells, UT at 14% and HER2 was 3+ positive.  It appears that this histopathologic information is on the mastectomy specimen. She underwent an MRI for staging of presumptive bone metastases which was performed 03/02/2014.  There were skeletal metastases in the thoracic vertebrae at 12, L1, L3, L5 and S1 vertebral body, ranging in size from 0.7-3.0 cm in size.  Ischial and right femur were also involved, as well as a large iliac mass measuring about 15 cm in the uterus. 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   Radiation Oncology consultation was performed 03/11/2014, and she was given radiation in 1 dose of 6 Gy to the right iliac lesion.        With the initial diagnosis, she initiated treatment with 6 cycles of CAF neoadjuvant chemotherapy 02/14, 07/07, 03/28, 04/18/14, 05/08 and 05/29/14. She also received monthly zoledronic acid.  She then underwent a modified left mastectomy of Palacios type and left lymphadenoectomy on 06/27/2014.   Pathologic  examination showed number at [facility] was [id number] showed infiltrative grade 2 ductal cancer with 6 level 1 metastatic lympFollow up with Jossie for visits and trastuzumab on 2-5, 2-26, 3-19 with CBC, CMP.  Echocardiogram on 3-19.  Follow up with me 4-9 with CBC, CMP, CA27.29 and CEA and with CT CAP on 4-8.h nodes and 3 level 2 metastatic lymph nodes.  The differentiation was intermediate.  The tumor was ER positive 70% of the cells, WY positive in 40% of the cells and HER2 was 3+ by immunohistochemistry and the Ki-67 labeling index was 20%. Her staging after surgery was stage IV, pT4b N2 M1.  I don't see a biopsy of the skeletal metastases.  She then had continuation with chemotherapy with 4 cycles of Herceptin and taxane with monthly zoledronic acid.  Tamoxifen was initiated.  She then had two years of Herceptin and a decision was made not to continue further Herceptin.  She continued on zoledronic acid every 3 months. She was clinically stable. [place] then [place] the [place] new [place] from [facility].  She has now been changed to letrozole.  Denosumab has now been held for new diagnosis of osteonecrosis of the R maxilla.     History of cholecystectomy 2020.      TREATMENT HISTORY:  A. Initial diagnosis with metastatic breast cancer in [facility].  Neoadjuvant CAF x 6.   B. Left mastectomy. Left axillary node dissection.  C.  Radiation in 1 dose to R iliac region.  C. Herceptin for 2 years taxane for a prescribed course then stopped, monthly zoledronic acid.  She had 2 years of Herceptin with tamoxifen added after chemotherapy.  D.  Tamoxifen alone and zoledronic acid every 3 months starting 2014.  Letrozole was started   E.  [place]  We restarted Herceptin every 3 weeks and continued tamoxifen. Bone targeted agent changed to denosumab every 9 weeks. Zometa discontinued 2017.  Tamoxifen was changed to letrozole August 2019.    GIANLUCA  Xgeva discontinued 12-17-19 because of dental issues.   G.  J+J vaccine  March, 2021.  H.  Was on Zometa once May 11.  Reaction with eye lid swelling. Discontinued Zometa.  H.  Restart Xgeva 6-22-21.  Continuing the trastuzumab and letrozole.  Both tolerated well.       INTERVAL HISTORY  Hoda returns to clinic and has been feeling entirely well.  She denies pain, fatigue, depression or anxiety.  She continues on trastuzumab, letrozole and Xgeva.  No pain, mild fatigue, no depression, no anxiety.  She does have an upper respiratory infection for which she is gradually recovering.  She been sick for 2 weeks.  She also had headache associated with the upper respiratory infection and symptoms of a cough and nasal congestion.  She tested node negative for COVID.     REVIEW OF SYSTEMS:  A 10-point review of systems is entirely negative.     She is a eating a Mediterranean-style diet.  She is exercising by swimming.  I did advise adding a weightbearing exercise.  She takes vitamin D3 2000 International Units per day and calcium.  Her last DEXA scan performed a week ago shows a most valid negative T-score of -2.5 in the left hip, consistent with osteoporosis.     Notably, she has had no bowel or bladder problems.     PHYSICAL EXAMINATION:    /72 (BP Location: Right arm, Patient Position: Sitting, Cuff Size: Adult Regular)   Pulse 82   Temp 98.4  F (36.9  C) (Oral)   Resp 16   Wt 81.7 kg (180 lb 3.2 oz)   SpO2 94%   BMI 32.74 kg/m    GENERAL:  Hoda appeared generally well.  HEENT:  She has no alopecia.  Examination of oropharynx reveals good dentition.  LYMPH:  There is no cervical, supraclavicular, subclavicular or axillary lymphadenopathy.  BREASTS: Breast exam deferred today.  LUNGS:  Clear to percussion and auscultation.  CARDIAC:  Heart has a regular rate and rhythm, S1, S2.  ABDOMEN:  Soft, nontender, without hepatosplenomegaly.  EXTREMITIES:  Without edema.  PSYCH:  Mood and affect were normal.     LABORATORY DATA:  CMP and CBC within normal limits.  CEA, CA27.29 pending.       CT Chest/Abdomen/Pelvis 4/12/23  IMPRESSION:  1.  Stable appearance of sclerotic osseous lesions.  2.  Stable small pulmonary nodules.  3.  No new disease in the chest, abdomen and pelvis.      ASSESSMENT AND PLAN:       1.  Hoda Brush is a 68-year-old woman with a history of ER-positive, WY-positive, HER2 positive breast cancer.  She is from [facility], formally from [facility], and came to live in the [place] with [place].  The tumor is ER positive, WY positive and HER2 positive.  She had metastatic disease at the time of presentation with bone-only metastases by report.  She underwent neoadjuvant CAF, had a left mastectomy, left axillary lymph node dissection, radiation to the right hip, which included the right iliac region where she had metastatic disease.  She was initially on tamoxifen but then was switched to letrozole.  She continues on this and every 3-week trastuzumab.  She has had no evidence of disease progression for the last 3 years.  Markers are low and stable.  We have continued Herceptin every 3 weeks as well as daily letrozole. CT CAP shows stable pulmonary nodules.   2.  Hoda Brush continues to do well on a combination of trastuzumab and letrozole. She has no evidence of disease progression.   Her ejection fraction remains stable.   She continues to do well on trastuzumab and letrozole.  She has no evidence of disease progression.  She will continue with every 4-month CT of the chest, abdomen and pelvis.    3.   The right maxillary osteonecrosis has healed and she is able to continue with Xgeva every 3 months. Continue with Xgeva every 3 months.  4.  Continue the letrozole and trastuzuamb.  Letrozole has been well-tolerated, as has trastuzuamb.  No significant joint stiffness.  5.  Echo. Stable EF.  60-65%.  6.  Discussion of bone health and right maxillary osteonecrosis.  All healed.   Xgeva is being continued.   7. Follow up.  Continue Herceptin through the end of 2024  alternating providers every 6 weeks. CT CAP every 4 months. Xgeva every 3 months. CBC, CMP every 3 weeks and CA27.29 and CEA every 6 weeks. Echo every 3 months, next on January 23.      Thank you for allowing us to participate in this patient's care.       Sincerely,      Lisandro Aguiar MD  Professor  [facility]  [phone].      Echo 3 months CT 4 months       I spent 35 minutes with the patient more than 50% of which was in counseling and coordination of care.

## 2024-01-11 NOTE — NURSING NOTE
Chief Complaint   Patient presents with    Port Draw     Labs drawn via port by RN in lab     Port accessed with 20 gauge, 3/4 inch flat needle by RN, labs collected, line flushed with saline and heparin.  Vitals taken. Pt checked in for appointment(s).     Sondra Martines, RN

## 2024-01-11 NOTE — NURSING NOTE
"Oncology Rooming Note    January 11, 2024 8:04 AM   Hoda Brush is a 68 year old female who presents for:    Chief Complaint   Patient presents with    Port Draw     Labs drawn via port by RN in lab    Oncology Clinic Visit     Breast CA     Initial Vitals: /72 (BP Location: Right arm, Patient Position: Sitting, Cuff Size: Adult Regular)   Pulse 82   Temp 98.4  F (36.9  C) (Oral)   Resp 16   Wt 81.7 kg (180 lb 3.2 oz)   SpO2 94%   BMI 32.74 kg/m   Estimated body mass index is 32.74 kg/m  as calculated from the following:    Height as of 7/11/23: 1.58 m (5' 2.21\").    Weight as of this encounter: 81.7 kg (180 lb 3.2 oz). Body surface area is 1.89 meters squared.  No Pain (0) Comment: Data Unavailable   No LMP recorded. Patient is postmenopausal.  Allergies reviewed: Yes  Medications reviewed: Yes    Medications: Medication refills not needed today.  Pharmacy name entered into [employer]: [facility] #[id number] - [place], [place] - [address]    Frailty Screening:   Is the patient here for a new oncology consult visit in cancer care? 2. No      Clinical concerns:        Marycarmen Moe              "

## 2024-01-11 NOTE — PROGRESS NOTES
Infusion Nursing Note:  Hoda Brush presents today for Cycle 108 Day 1 Herceptin and Xgeva.    Patient seen by provider today: Yes: Dr. Aguiar   present during visit today: Not Applicable.    Note: Hoda comes into the clinic today doing well overall and has no concerns to offer following her provider visit. She has no pain and denies s/s of infection      Intravenous Access:  Implanted Port.    Treatment Conditions:  Lab Results   Component Value Date    HGB 12.5 01/11/2024    WBC 8.5 01/11/2024    ANEU 3.1 06/22/2021    ANEUTAUTO 4.1 01/11/2024     01/11/2024        Lab Results   Component Value Date     01/11/2024    POTASSIUM 4.1 01/11/2024    CR 0.77 01/11/2024    TAYLER 9.8 01/11/2024    BILITOTAL 0.3 01/11/2024    ALBUMIN 4.0 01/11/2024    ALT 49 01/11/2024    AST 40 01/11/2024       Results reviewed, labs MET treatment parameters, ok to proceed with treatment.  ECHO/MUGA completed 10/19/23  EF 60-65%.      Post Infusion Assessment:  Patient tolerated infusion without incident.  Patient tolerated Xgeva injection to back of right arm (per patient preference) without incident.  Blood return noted pre and post infusion.  Site patent and intact, free from redness, edema or discomfort.  No evidence of extravasations.  Access discontinued per protocol.       Discharge Plan:   Patient declined prescription refills.  Discharge instructions reviewed with: Patient.  Patient and/or family verbalized understanding of discharge instructions and all questions answered.  AVS to patient via [employer].  Patient will return 2/1 for next appointment.   Patient discharged in stable condition accompanied by: self.  Departure Mode: Ambulatory.      Delia Sears RN

## 2024-01-13 LAB — CANCER AG27-29 SERPL-ACNC: 11.8 U/ML

## 2024-01-14 ENCOUNTER — MYC MEDICAL ADVICE (OUTPATIENT)
Dept: FAMILY MEDICINE | Facility: CLINIC | Age: 69
End: 2024-01-14

## 2024-01-14 ENCOUNTER — OFFICE VISIT (OUTPATIENT)
Dept: URGENT CARE | Facility: URGENT CARE | Age: 69
End: 2024-01-14
Payer: COMMERCIAL

## 2024-01-14 ENCOUNTER — NURSE TRIAGE (OUTPATIENT)
Dept: NURSING | Facility: CLINIC | Age: 69
End: 2024-01-14
Payer: COMMERCIAL

## 2024-01-14 VITALS
TEMPERATURE: 100.2 F | SYSTOLIC BLOOD PRESSURE: 129 MMHG | OXYGEN SATURATION: 97 % | WEIGHT: 180 LBS | HEART RATE: 103 BPM | DIASTOLIC BLOOD PRESSURE: 86 MMHG | BODY MASS INDEX: 32.71 KG/M2 | RESPIRATION RATE: 18 BRPM

## 2024-01-14 DIAGNOSIS — U07.1 INFECTION DUE TO 2019 NOVEL CORONAVIRUS: Primary | ICD-10-CM

## 2024-01-14 DIAGNOSIS — R05.1 ACUTE COUGH: ICD-10-CM

## 2024-01-14 PROCEDURE — 99214 OFFICE O/P EST MOD 30 MIN: CPT

## 2024-01-14 RX ORDER — BENZONATATE 200 MG/1
200 CAPSULE ORAL 3 TIMES DAILY PRN
Qty: 21 CAPSULE | Refills: 0 | Status: SHIPPED | OUTPATIENT
Start: 2024-01-14 | End: 2024-05-14

## 2024-01-14 NOTE — TELEPHONE ENCOUNTER
Nurse Triage SBAR    Is this a 2nd Level Triage? YES, LICENSED PRACTITIONER REVIEW IS REQUIRED    Situation: Pt's daughter, Shyla calling re: positive Covid test. C2C on file, but pt not with the caller, so placed on 3-way call with pt.    Background: Pt tested positive for Covid this morning. Last chemo was on Thursday.     Assessment: Pts temp is 101. Has a cough, feels like something is scratching her throat. Says when she is laying down can't cough things out. Denies wheezing. Has nasal congestion and a headache.     Protocol Recommended Disposition:   See hCP )or PCP triage) within 4 hours    Recommendation: Will page On Call Provider for 2LT.     Paged to provider    Does the patient meet one of the following criteria for ADS visit consideration? 16+ years old, with an [facility] PCP (CLOSED on Weekends)    TIP  Providers, please consider if this condition is appropriate for management at one of our Acute and Diagnostic Services sites.     If patient is a good candidate, please use dotphrase <dot>triageresponse and select Refer to ADS to document.    Provider Recommendation Follow Up:   Received call back from Dr. Anthony Wall at 1043. She recommends pt be evaluated in the ED.    Reached patient/caregiver. Informed of provider's recommendations. Patient verbalized understanding and does not agree with the plan. Would like to avoid the ED and start at  for evaluation instead. Explained to daughter that the  may not have the appropriate equipment to treat the pt and she may need a higher level of care. Directions given to [facility] location. Daughter verbalized understanding.        Tanya Boyd, RN, BSN  [facility]   Triage Nurse Advisor    Reason for Disposition   MILD difficulty breathing (e.g., minimal/no SOB at rest, SOB with walking, pulse <100)    Additional Information   Negative: SEVERE difficulty breathing (e.g., struggling for each breath, speaks in single words)   Negative:  Difficult to awaken or acting confused (e.g., disoriented, slurred speech)   Negative: Bluish (or gray) lips or face now   Negative: Shock suspected (e.g., cold/pale/clammy skin, too weak to stand, low BP, rapid pulse)   Negative: Sounds like a life-threatening emergency to the triager   Negative: [1] Diagnosed or suspected COVID-19 AND [2] symptoms lasting 3 or more weeks   Negative: [1] COVID-19 exposure AND [2] no symptoms   Negative: COVID-19 vaccine reaction suspected (e.g., fever, headache, muscle aches) occurring 1 to 3 days after getting vaccine   Negative: COVID-19 vaccine, questions about   Negative: [1] Lives with someone known to have influenza (flu test positive) AND [2] flu-like symptoms (e.g., cough, runny nose, sore throat, SOB; with or without fever)   Negative: [1] Possible COVID-19 symptoms AND [2] triager concerned about severity of symptoms or other causes   Negative: COVID-19 and breastfeeding, questions about   Negative: SEVERE or constant chest pain or pressure  (Exception: Mild central chest pain, present only when coughing.)   Negative: MODERATE difficulty breathing (e.g., speaks in phrases, SOB even at rest, pulse 100-120)   Negative: [1] Headache AND [2] stiff neck (can't touch chin to chest)   Negative: Oxygen level (e.g., pulse oximetry) 90 percent or lower   Negative: Chest pain or pressure  (Exception: MILD central chest pain, present only when coughing.)   Negative: [1] Drinking very little AND [2] dehydration suspected (e.g., no urine > 12 hours, very dry mouth, very lightheaded)   Negative: Patient sounds very sick or weak to the triager    Protocols used: Coronavirus (COVID-19) Diagnosed or [id number]

## 2024-01-14 NOTE — PATIENT INSTRUCTIONS
Quarantine for five days since symptom onset.  Should symptoms persist after five days, wear a mask for the next five days or until symptom resolution whichever comes first.  Take Paxlovid as prescribed.  Hold Trazodone. Restart 3 day(s) after the completion of Paxlovid. Get plenty of rest and drink fluids.  Can use Tylenol as needed for pain and fever.  Maximum dose of Tylenol is 3000mg in a 24 hour period of time.  You can also try warm salt water gargles, hot/warm water or tea with honey and/or lemon and/or Cepacol lozenges or spray for your sore throat.  Take benzonatate for the cough.    For informational purposes only. Not to replace the advice of your health care provider. Copyright   2022 [facility]. All rights reserved. Clinically reviewed by Shannan Rodriguez, PharmD, BCACP. [employer] [id number] - REV 06/23.  COVID-19 Outpatient Treatments  Your care team can help you find the best treatments for COVID-19. Talk to a health care provider or refer to the FDA medicine fact sheets below.  Paxlovid (nirmatrelvir and ritonavir): https://www.paxlovid.com/resources  Molnupiravir (Lagevrio): https://www.fda.gov/media/618262/download  Important: We can only prescribe Paxlovid or Molnupiravir when it can be started within 5 days of first having symptoms.  Paxlovid (nimatrelvir and ritonavir)  How it works  Two medicines (nirmatrelvir and ritonavir) are taken together. They stop the virus from growing. Less amount of virus is easier for your body to fight.  Benefits  Lowers risk of a hospital stay or death from COVID-19.  How to take  Medicine comes in a daily container with both medicine tablets. Take by mouth twice daily (once in the morning, once at night) for 5 days.  The number of tablets to take varies by patient.  Don't chew or break capsules. Swallow whole.  When to take  Take it as soon as possible and within 5 days of your first symptoms.  Who can take it  Patients must be 12 years or older weigh at  least 88 pounds. Paxlovid is the preferred treatment for pregnant patients.  Possible side effects  Can cause altered sense of taste, diarrhea (loose, watery stools), high blood pressure, muscle aches.  Medicine conflicts  Some medicines may conflict with Paxlovid and may cause serious side effects.  Tell your care team about all the medicines you take, including prescription and over-the-counter medicines, vitamins, and herbal supplements.  Your care team will review your medicines to make sure that you can safely take Paxlovid.  Cautions  Paxlovid is not advised for patients with severe kidney or liver disease. If you have kidney or liver problems, the dose may need to be changed.  If you're pregnant or breastfeeding, talk to your care team about your options.  If you take hormonal birth control (such as the Pill), then you or your partner should also use a non-hormonal form of birth control (such as a condom). Keep doing this for 1 menstrual cycle after your last dose of Paxlovid.  Molnupiravir (lagevrio)  How it works  Stops the virus from growing. Less amount of virus is easier for your body to fight.  Benefits  Lowers risk of a hospital stay or death from COVID-19.  How to take  Take 4 capsules by mouth every 12 hours (4 in the morning and 4 at night) for 5 days. Don't chew or break capsules. Swallow whole.  When to take  Take as soon as possible and within 5 days of your first symptoms.  Who can take it  Patients must be 18 years or older.   Possible side effects  Diarrhea (loose, watery stools), nausea (feeling sick to your stomach), dizziness, headaches.  Medicine conflicts  Right now, there are no known conflicts with other drugs. But tell your care team about all medicines you take.  Cautions  This medicine is not advised for patients who are pregnant.  If you are someone who could become pregnant, use trusted birth control until 4 days after your last dose of molnupiravir.  If your partner could become  pregnant, you should use trusted birth control until 3 months after your last dose of molnupiravir.

## 2024-01-14 NOTE — PROGRESS NOTES
ASSESSMENT:  (U07.1) Infection due to 2019 novel coronavirus  (primary encounter diagnosis)  Plan: nirmatrelvir and ritonavir (PAXLOVID) 300         mg/100 mg therapy pack    (R05.1) Acute cough  Plan: benzonatate (TESSALON) 200 MG capsule    PLAN:  Informed the patient to quarantine for 5 days since symptom onset.  We discussed that should symptoms persist after 5 days to wear a mask for the next 5 days or until symptom resolution whichever comes first.  We also discussed taking Paxlovid as prescribed.  Informed the patient to hold trazodone and restart 3 days after completion of Paxlovid.  We discussed getting plenty of rest, drinking fluids and using Tylenol as needed for pain and fever with the maximum dose being 3000 mg in a 24-hour period of time.  We also discussed using warm salt water gargles, hot/warm water or tea with honey under lemon and or Cepacol lozenges or spray for the sore throat.  Informed the patient to take benzonatate for the cough.  COVID-19 outpatient treatment patient instructions discussed and provided.  Discussed the need to return to clinic with any new or worsening symptoms.  Patient acknowledged her understanding of the above plan.    The use of Dragon/Seevibes dictation services may have been used to construct the content in this note; any grammatical or spelling errors are non-intentional. Please contact the author of this note directly if you are in need of any clarification.      KATHI Torres CNP      SUBJECTIVE:   Hoda Brush is a 68 year old female presenting with a chief complaint of fever, runny nose, stuffy nose, cough - non-productive, sore throat, and fatigue.  Onset of symptoms was 2 day(s) ago.  Course of illness is worsening.    Patient denies: ear pain, vomiting, and diarrhea  Treatment measures tried include DayQuil/NyQuil.  Predisposing factors include at home COVID test positive.    ROS:  Negative except noted above.    OBJECTIVE:  /86    Pulse 103   Temp 100.2  F (37.9  C)   Resp 18   Wt 81.6 kg (180 lb)   SpO2 97%   BMI 32.71 kg/m    GENERAL APPEARANCE: healthy, alert and no distress  EYES: EOMI,  PERRL, conjunctiva clear  HENT: ear canals and TM's normal.  Nose and mouth without ulcers, erythema or lesions  NECK: supple, nontender, no lymphadenopathy  RESP: lungs clear to auscultation - no rales, rhonchi or wheezes  CV: regular rates and rhythm, normal S1 S2, no murmur noted  SKIN: no suspicious lesions or rashes

## 2024-01-15 ENCOUNTER — TELEPHONE (OUTPATIENT)
Dept: FAMILY MEDICINE | Facility: CLINIC | Age: 69
End: 2024-01-15
Payer: COMMERCIAL

## 2024-01-15 NOTE — TELEPHONE ENCOUNTER
Alan Drummond,    Thank you for reaching out regarding your health care concern.   Please call [phone] and choose option #2 to speak to a triage nurse about your symptoms so we can best direct your care.    Thank you,  [facility] RN Triage Team    Alan Dee, I tested positive for Covid this morning. I have temp and scratchy throat. I was wondering if I should be taking any Covid specific medicine. My last chemo was on Thursday and I sent a note about Covid to my oncology doctor as well. Thank you, Hoda

## 2024-01-23 ENCOUNTER — HOSPITAL ENCOUNTER (OUTPATIENT)
Dept: CARDIOLOGY | Facility: CLINIC | Age: 69
Discharge: HOME | End: 2024-01-23
Attending: INTERNAL MEDICINE | Admitting: INTERNAL MEDICINE
Payer: COMMERCIAL

## 2024-01-23 DIAGNOSIS — Z51.11 ENCOUNTER FOR ANTINEOPLASTIC CHEMOTHERAPY: ICD-10-CM

## 2024-01-23 LAB — BI-PLANE LVEF ECHO: NORMAL

## 2024-01-23 PROCEDURE — 93306 TTE W/DOPPLER COMPLETE: CPT | Mod: 26 | Performed by: INTERNAL MEDICINE

## 2024-01-23 PROCEDURE — 93356 MYOCRD STRAIN IMG SPCKL TRCK: CPT | Performed by: INTERNAL MEDICINE

## 2024-01-23 PROCEDURE — 93306 TTE W/DOPPLER COMPLETE: CPT

## 2024-02-01 ENCOUNTER — ONCOLOGY VISIT (OUTPATIENT)
Dept: ONCOLOGY | Facility: CLINIC | Age: 69
End: 2024-02-01
Attending: NURSE PRACTITIONER
Payer: COMMERCIAL

## 2024-02-01 ENCOUNTER — APPOINTMENT (OUTPATIENT)
Dept: LAB | Facility: CLINIC | Age: 69
End: 2024-02-01
Attending: NURSE PRACTITIONER
Payer: COMMERCIAL

## 2024-02-01 VITALS
BODY MASS INDEX: 32.56 KG/M2 | HEART RATE: 77 BPM | OXYGEN SATURATION: 95 % | SYSTOLIC BLOOD PRESSURE: 118 MMHG | TEMPERATURE: 97.9 F | DIASTOLIC BLOOD PRESSURE: 76 MMHG | RESPIRATION RATE: 16 BRPM | WEIGHT: 179.2 LBS

## 2024-02-01 DIAGNOSIS — L60.0 INGROWN TOENAIL OF LEFT FOOT: ICD-10-CM

## 2024-02-01 DIAGNOSIS — C50.912 MALIGNANT NEOPLASM OF LEFT FEMALE BREAST, UNSPECIFIED ESTROGEN RECEPTOR STATUS, UNSPECIFIED SITE OF BREAST (H): Primary | ICD-10-CM

## 2024-02-01 DIAGNOSIS — C79.51 MALIGNANT NEOPLASM METASTATIC TO BONE (H): ICD-10-CM

## 2024-02-01 DIAGNOSIS — L03.032 PARONYCHIA OF TOE, LEFT: ICD-10-CM

## 2024-02-01 LAB
ALBUMIN SERPL BCG-MCNC: 4.1 G/DL (ref 3.5–5.2)
ALP SERPL-CCNC: 46 U/L (ref 40–150)
ALT SERPL W P-5'-P-CCNC: 49 U/L (ref 0–50)
ANION GAP SERPL CALCULATED.3IONS-SCNC: 9 MMOL/L (ref 7–15)
AST SERPL W P-5'-P-CCNC: 40 U/L (ref 0–45)
BASOPHILS # BLD AUTO: 0 10E3/UL (ref 0–0.2)
BASOPHILS NFR BLD AUTO: 1 %
BILIRUB SERPL-MCNC: 0.3 MG/DL
BUN SERPL-MCNC: 12.8 MG/DL (ref 8–23)
CALCIUM SERPL-MCNC: 9.8 MG/DL (ref 8.8–10.2)
CEA SERPL-MCNC: 1.2 NG/ML
CHLORIDE SERPL-SCNC: 105 MMOL/L (ref 98–107)
CREAT SERPL-MCNC: 0.78 MG/DL (ref 0.51–0.95)
DEPRECATED HCO3 PLAS-SCNC: 27 MMOL/L (ref 22–29)
EGFRCR SERPLBLD CKD-EPI 2021: 83 ML/MIN/1.73M2
EOSINOPHIL # BLD AUTO: 0.2 10E3/UL (ref 0–0.7)
EOSINOPHIL NFR BLD AUTO: 3 %
ERYTHROCYTE [DISTWIDTH] IN BLOOD BY AUTOMATED COUNT: 13.4 % (ref 10–15)
GLUCOSE SERPL-MCNC: 93 MG/DL (ref 70–99)
HCT VFR BLD AUTO: 38.9 % (ref 35–47)
HGB BLD-MCNC: 12.3 G/DL (ref 11.7–15.7)
IMM GRANULOCYTES # BLD: 0 10E3/UL
IMM GRANULOCYTES NFR BLD: 0 %
LYMPHOCYTES # BLD AUTO: 2.8 10E3/UL (ref 0.8–5.3)
LYMPHOCYTES NFR BLD AUTO: 38 %
MCH RBC QN AUTO: 29.6 PG (ref 26.5–33)
MCHC RBC AUTO-ENTMCNC: 31.6 G/DL (ref 31.5–36.5)
MCV RBC AUTO: 94 FL (ref 78–100)
MONOCYTES # BLD AUTO: 0.4 10E3/UL (ref 0–1.3)
MONOCYTES NFR BLD AUTO: 6 %
NEUTROPHILS # BLD AUTO: 4 10E3/UL (ref 1.6–8.3)
NEUTROPHILS NFR BLD AUTO: 52 %
NRBC # BLD AUTO: 0 10E3/UL
NRBC BLD AUTO-RTO: 0 /100
PLATELET # BLD AUTO: 211 10E3/UL (ref 150–450)
POTASSIUM SERPL-SCNC: 4.2 MMOL/L (ref 3.4–5.3)
PROT SERPL-MCNC: 7.4 G/DL (ref 6.4–8.3)
RBC # BLD AUTO: 4.16 10E6/UL (ref 3.8–5.2)
SODIUM SERPL-SCNC: 141 MMOL/L (ref 135–145)
WBC # BLD AUTO: 7.5 10E3/UL (ref 4–11)

## 2024-02-01 PROCEDURE — 99214 OFFICE O/P EST MOD 30 MIN: CPT | Performed by: NURSE PRACTITIONER

## 2024-02-01 PROCEDURE — 258N000003 HC RX IP 258 OP 636: Performed by: NURSE PRACTITIONER

## 2024-02-01 PROCEDURE — 82040 ASSAY OF SERUM ALBUMIN: CPT | Performed by: NURSE PRACTITIONER

## 2024-02-01 PROCEDURE — 96413 CHEMO IV INFUSION 1 HR: CPT

## 2024-02-01 PROCEDURE — 82378 CARCINOEMBRYONIC ANTIGEN: CPT | Performed by: NURSE PRACTITIONER

## 2024-02-01 PROCEDURE — 85025 COMPLETE CBC W/AUTO DIFF WBC: CPT | Performed by: NURSE PRACTITIONER

## 2024-02-01 PROCEDURE — 250N000011 HC RX IP 250 OP 636: Performed by: NURSE PRACTITIONER

## 2024-02-01 PROCEDURE — G0463 HOSPITAL OUTPT CLINIC VISIT: HCPCS | Mod: 25 | Performed by: NURSE PRACTITIONER

## 2024-02-01 PROCEDURE — 250N000011 HC RX IP 250 OP 636: Mod: JZ | Performed by: NURSE PRACTITIONER

## 2024-02-01 PROCEDURE — 86300 IMMUNOASSAY TUMOR CA 15-3: CPT | Performed by: NURSE PRACTITIONER

## 2024-02-01 PROCEDURE — 36591 DRAW BLOOD OFF VENOUS DEVICE: CPT | Performed by: NURSE PRACTITIONER

## 2024-02-01 RX ORDER — DIPHENHYDRAMINE HCL 25 MG
50 CAPSULE ORAL ONCE
Status: CANCELLED
Start: 2024-02-03

## 2024-02-01 RX ORDER — ALBUTEROL SULFATE 0.83 MG/ML
2.5 SOLUTION RESPIRATORY (INHALATION)
Status: CANCELLED | OUTPATIENT
Start: 2024-02-03

## 2024-02-01 RX ORDER — SODIUM CHLORIDE 9 MG/ML
1000 INJECTION, SOLUTION INTRAVENOUS CONTINUOUS PRN
Status: CANCELLED
Start: 2024-02-03

## 2024-02-01 RX ORDER — HEPARIN SODIUM (PORCINE) LOCK FLUSH IV SOLN 100 UNIT/ML 100 UNIT/ML
5 SOLUTION INTRAVENOUS ONCE
Status: COMPLETED | OUTPATIENT
Start: 2024-02-01 | End: 2024-02-01

## 2024-02-01 RX ORDER — ACETAMINOPHEN 325 MG/1
650 TABLET ORAL
Status: CANCELLED | OUTPATIENT
Start: 2024-02-03

## 2024-02-01 RX ORDER — DIPHENHYDRAMINE HYDROCHLORIDE 50 MG/ML
50 INJECTION INTRAMUSCULAR; INTRAVENOUS
Status: CANCELLED
Start: 2024-02-03

## 2024-02-01 RX ORDER — METHYLPREDNISOLONE SODIUM SUCCINATE 125 MG/2ML
125 INJECTION, POWDER, LYOPHILIZED, FOR SOLUTION INTRAMUSCULAR; INTRAVENOUS
Status: CANCELLED
Start: 2024-02-03

## 2024-02-01 RX ORDER — HEPARIN SODIUM (PORCINE) LOCK FLUSH IV SOLN 100 UNIT/ML 100 UNIT/ML
5 SOLUTION INTRAVENOUS EVERY 8 HOURS
Status: CANCELLED | OUTPATIENT
Start: 2024-02-03

## 2024-02-01 RX ORDER — EPINEPHRINE 0.3 MG/.3ML
0.3 INJECTION SUBCUTANEOUS EVERY 5 MIN PRN
Status: CANCELLED | OUTPATIENT
Start: 2024-02-03

## 2024-02-01 RX ORDER — EPINEPHRINE 1 MG/ML
0.3 INJECTION, SOLUTION INTRAMUSCULAR; SUBCUTANEOUS EVERY 5 MIN PRN
Status: CANCELLED | OUTPATIENT
Start: 2024-02-03

## 2024-02-01 RX ORDER — ALBUTEROL SULFATE 90 UG/1
1-2 AEROSOL, METERED RESPIRATORY (INHALATION)
Status: CANCELLED
Start: 2024-02-03

## 2024-02-01 RX ORDER — LORAZEPAM 2 MG/ML
0.5 INJECTION INTRAMUSCULAR EVERY 4 HOURS PRN
Status: CANCELLED
Start: 2024-02-03

## 2024-02-01 RX ORDER — HEPARIN SODIUM (PORCINE) LOCK FLUSH IV SOLN 100 UNIT/ML 100 UNIT/ML
5 SOLUTION INTRAVENOUS EVERY 8 HOURS
Status: DISCONTINUED | OUTPATIENT
Start: 2024-02-01 | End: 2024-02-01 | Stop reason: HOSPADM

## 2024-02-01 RX ADMIN — Medication 5 ML: at 10:37

## 2024-02-01 RX ADMIN — TRASTUZUMAB 450 MG: 150 INJECTION, POWDER, LYOPHILIZED, FOR SOLUTION INTRAVENOUS at 11:52

## 2024-02-01 RX ADMIN — Medication 5 ML: at 12:27

## 2024-02-01 ASSESSMENT — PAIN SCALES - GENERAL: PAINLEVEL: NO PAIN (0)

## 2024-02-01 NOTE — PROGRESS NOTES
Feb 1, 2024    ONCOLOGY RETURN VISIT:     Hoda is a patient from [facility] and is seen here for continuation of care for her metastatic ER+HER2- breast cancer.  She is a refugee from [facility] and was initially seen in clinic with her daughter.  The only data we have from [facility] is an English translation of her records.       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  On 02/11/2014, she was seen by an oncologist.  The clinical staging of T4b N2 M1 was noted.   Her breast cancer was on the left side. There was no right breast cancer, confirmed by the patient and her daughter, correcting a possible error in the translated records.  ER was positive in 100% of the cells, NJ at 14% and HER2 was 3+ positive.  It appears that this histopathologic information is on the mastectomy specimen. She underwent an MRI for staging of presumptive bone metastases which was performed 03/02/2014.  There were skeletal metastases in the thoracic vertebrae at 12, L1, L3, L5 and S1 vertebral body, ranging in size from 0.7-3.0 cm in size.  Ischial and right femur were also involved, as well as a large iliac mass measuring about 15 cm in the uterus. 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   Radiation Oncology consultation was performed 03/11/2014, and she was given radiation in 1 dose of 6 Gy to the right iliac lesion.        With the initial diagnosis, she initiated treatment with 6 cycles of CAF neoadjuvant chemotherapy 02/14, 07/07, 03/28, 04/18/14, 05/08 and 05/29/14. She also received monthly zoledronic acid.  She then underwent a modified left mastectomy of Palacios type and left lymphadenoectomy on 06/27/2014.   Pathologic examination showed number at [facility] was [id number] showed infiltrative grade 2 ductal cancer with 6 level 1 metastatic lymph nodes and 3 level 2 metastatic lymph nodes.  The differentiation was intermediate.  The tumor was ER  positive 70% of the cells, PA positive in 40% of the cells and HER2 was 3+ by immunohistochemistry and the Ki-67 labeling index was 20%. Her staging after surgery was stage IV, pT4b N2 M1.  I don't see a biopsy of the skeletal metastases.  She then had continuation with chemotherapy with 4 cycles of Herceptin and taxane with monthly zoledronic acid.  Tamoxifen was initiated.  She then had two years of Herceptin and a decision was made not to continue further Herceptin.  She continued on zoledronic acid every 3 months. She was clinically stable. She then moved to the [place] new refugee from [facility].  She has now been changed to letrozole.  Denosumab was held for new diagnosis of osteonecrosis.     History of cholecystectomy 2020.      TREATMENT HISTORY:  A. Initial diagnosis with metastatic breast cancer in [facility].  Neoadjuvant CAF x 6.   B. Left mastectomy. Left axillary node dissection.  C. Radiation in 1 dose to R iliac region.  C. Herceptin for 2 years taxane for a prescribed course then stopped, monthly zoledronic acid.  She had 2 years of Herceptin with tamoxifen added after chemotherapy.  D. Tamoxifen alone and zoledronic acid every 3 months starting 2014.  Letrozole was started   [place]  We restarted Herceptin every 3 weeks and continued tamoxifen. Bone targeted agent changed to denosumab every 9 weeks. Zometa discontinued 2017.  Tamoxifen was changed to letrozole August 2019.    F. Xgeva discontinued 12-17-19 because of dental issues.   G. J+J vaccine March, 2021.  H. Was on Zometa once May 11.  Reaction with eye lid swelling. Discontinued Zometa.  H. Restart Xgeva 6-22-21.  Continuing the trastuzumab and letrozole.  Both tolerated well.       INTERVAL HISTORY:  Hoda returns to clinic today and is feeling well.   -Tested positive for COVID after her last visit.  Took Paxlovid and developed metal taste in her mouth.   -She is feeling better.  -No new concerns except for as below.  -Left great toe  paronychia - post-nail cutting.  No trauma and no s/s infection.  -She denies any new aches or bony pain.    -She reports that her energy is good.    -She denies fevers or chills or signs of systemic illness.    -She denies drenching night sweats or unexplained weight loss.    -She denies headache, dizziness or vision changes.    -She denies cough, chest pain, or shortness of breath at rest.    -She denies nausea, vomiting, abdominal pain or bowel changes.    -She continues on trastuzumab, letrozole and Xgeva, and is tolerating these well.    -She has no new dental concerns today. She is due for a dental check up.    ROS: 12 point ROS neg other than the symptoms noted above in the HPI.    PHYSICAL EXAMINATION:    /76   Pulse 77   Temp 97.9  F (36.6  C) (Oral)   Resp 16   Wt 81.3 kg (179 lb 3.2 oz)   SpO2 95%   BMI 32.56 kg/m    General: No acute distress  HEENT: Sclera anicteric.   Lymph: No cervical or supraclavicular lymphadenopathy noted.  Heart: Regular, rate, and rhythm.  Lungs: Clear to ascultation bilaterally.  Abdomen: Positive bowel sounds. Soft, non-distended, non-tender.   Extremities: No lower extremity edema  Neuro: Cranial nerves grossly intact  Rash: None noted on visible skin.     LABORATORY DATA:   Most Recent 3 CBC's:  Recent Labs   Lab Test 02/01/24  1036 01/11/24  0710 11/30/23  1105   WBC 7.5 8.5 7.5   HGB 12.3 12.5 13.1   MCV 94 94 93    240 181   ANEUTAUTO 4.0 4.1 3.8     Most Recent 3 BMP's:  Recent Labs   Lab Test 01/11/24  0710 11/30/23  1105 11/09/23  1001    142 143   POTASSIUM 4.1 4.3 4.1   CHLORIDE 103 105 106   CO2 29 28 29   BUN 13.9 13.5 10.8   CR 0.77 0.79 0.79   ANIONGAP 9 9 8   TAYLER 9.8 9.8 9.8   * 86 111*   PROTTOTAL 7.5 7.6 7.6   ALBUMIN 4.0 4.1 4.1    Most Recent 3 LFT's:  Recent Labs   Lab Test 01/11/24  0710 11/30/23  1105 11/09/23  1001   AST 40 57* 53*   ALT 49 60* 52*   ALKPHOS 48 48 52   BILITOTAL 0.3 0.3 0.3    Most Recent 2 TSH and  T4:  Recent Labs   Lab Test 07/06/23  1216 12/09/21  1438   TSH 0.77 1.01   I reviewed the labs above.  CMP pending at the time of her visit.     IMAGING:  Complete Echo Adult: 1/11/2024     Left ventricular systolic function is normal. Visually LVEF 60-65%, Biplane  LVEF is 57%.  Global peak LV longitudinal strain is averaged at -20.7%. This is within  reported normal limits (normal <-18%).  Right ventricular global function is normal.  No significant valvular abnormalities.  The inferior vena cava was normal in size with preserved respiratory  variability.  There is no pericardial effusion.     This study was compared to a TTE from 10/19/2023. There has been no  significant change.    I reviewed the above labs and imaging today.     ASSESSMENT AND PLAN:       Hoda Brush is a 68-year-old woman with a history of metastatic ER-positive, MA-positive, HER2 positive breast cancer to the bones.  She is from [facility], formally from [facility], and came to live in the [place] with her daughter.   She had metastatic disease at the time of presentation with bone-only metastases by report.  She underwent neoadjuvant CAF, had a left mastectomy, left axillary lymph node dissection, radiation to the right hip, which included the right iliac region where she had metastatic disease.  She was initially on tamoxifen but then was switched to letrozole.  She continues on this and every 3-week trastuzumab.  She has had no evidence of disease progression for the last 3 years.  Markers are low and stable, pending today.  We have continued Herceptin every 3 weeks as well as daily letrozole. CT CAP shows stable pulmonary nodules and stable sclerotic bone metastasis.   - Labs and clinically appropriate to continue herceptin and letrozole.   - 1/2024 echo overall stable with EF of 60%, no significant change from 07/2023 study.   - Continue CT CAP every 4 months, scheduled for April 2024.      Bone Metastasis   - Hx of R maxillary  osteonecrosis has healed and she was cleared to restart Xgeva. Will continue every 3 months and administered 1/11/2024.  Per patient recent dental exam 08/16/23 with no concerns and no changes noted since that time.  She is due for another appt this month.   - R hip discomfort reported in her August visit improved overall.  She now only experiences this as mild discomfort when standing for long periods.  Resolves with position changes.  She will call with any new concerns.      ALT/AST elevation  - AST and ALT mildly elevated in the past.  CMP pending today but these were normal last month,  - Per chart review she has had intermittent elevations over the last several years.   - Most recent CT with no concerning findings; does have hepatic steatosis and is s/p cholecystectomy in 2020.   - Continue to closely monitor.    Paronychia:  - Left great toe, mild redness and swelling noted.  - Recommended soaks and topical antibiotic ointment three times/day x 7 days.  - She will call with worsening and at that time could consider oral antibiotic per protocol.  - Likely r/t recent nail trim and she has a history of ingrown toenail.  Discussed podiatry referral if persists/ worsens.     Follow up   - Continue herceptin and labs every three weeks with provider visits every 3-6 weeks.     Kassidy Robles, KATHI, CNP  [facility]  [address] [place] [address] [phone]    ADDENDUM: Call from patient/family re: left toe still not improved with soaks and topical antibiotics.  Interested in antibiotics and podiatry referral.  Prescription sent and referral placed.

## 2024-02-01 NOTE — LETTER
2/1/2024         RE: Hoda Brush  [address]  [facility] [address]        Dear Colleague,    Thank you for referring your patient, Hoda Brush, to the [facility]. Please see a copy of my visit note below.    Feb 1, 2024    ONCOLOGY RETURN VISIT:     Hoda is a patient from [facility] and is seen here for continuation of care for her metastatic ER+HER2- breast cancer.  She is a refugee from [facility] and was initially seen in clinic with her daughter.  The only data we have from [facility] is an English translation of her records.       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  On 02/11/2014, she was seen by an oncologist.  The clinical staging of T4b N2 M1 was noted.   Her breast cancer was on the left side. There was no right breast cancer, confirmed by the patient and her daughter, correcting a possible error in the translated records.  ER was positive in 100% of the cells, NC at 14% and HER2 was 3+ positive.  It appears that this histopathologic information is on the mastectomy specimen. She underwent an MRI for staging of presumptive bone metastases which was performed 03/02/2014.  There were skeletal metastases in the thoracic vertebrae at 12, L1, L3, L5 and S1 vertebral body, ranging in size from 0.7-3.0 cm in size.  Ischial and right femur were also involved, as well as a large iliac mass measuring about 15 cm in the uterus. 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   Radiation Oncology consultation was performed 03/11/2014, and she was given radiation in 1 dose of 6 Gy to the right iliac lesion.        With the initial diagnosis, she initiated treatment with 6 cycles of CAF neoadjuvant chemotherapy 02/14, 07/07, 03/28, 04/18/14, 05/08 and 05/29/14. She also received monthly zoledronic acid.  She then underwent a modified left mastectomy of Palacios type and left  lymphadenoectomy on 06/27/2014.   Pathologic examination showed number at [facility] was [id number] showed infiltrative grade 2 ductal cancer with 6 level 1 metastatic lymph nodes and 3 level 2 metastatic lymph nodes.  The differentiation was intermediate.  The tumor was ER positive 70% of the cells, NC positive in 40% of the cells and HER2 was 3+ by immunohistochemistry and the Ki-67 labeling index was 20%. Her staging after surgery was stage IV, pT4b N2 M1.  I don't see a biopsy of the skeletal metastases.  She then had continuation with chemotherapy with 4 cycles of Herceptin and taxane with monthly zoledronic acid.  Tamoxifen was initiated.  She then had two years of Herceptin and a decision was made not to continue further Herceptin.  She continued on zoledronic acid every 3 months. She was clinically stable. [place] then [place] to the [place] new refugee from [facility].  She has now been changed to letrozole.  Denosumab was held for new diagnosis of osteonecrosis.     History of cholecystectomy 2020.      TREATMENT HISTORY:  A. Initial diagnosis with metastatic breast cancer in [facility].  Neoadjuvant CAF x 6.   B. Left mastectomy. Left axillary node dissection.  C. Radiation in 1 dose to R iliac region.  C. Herceptin for 2 years taxane for a prescribed course then stopped, monthly zoledronic acid.  She had 2 years of Herceptin with tamoxifen added after chemotherapy.  D. Tamoxifen alone and zoledronic acid every 3 months starting 2014.  Letrozole was started   E. [place]  We restarted Herceptin every 3 weeks and continued tamoxifen. Bone targeted agent changed to denosumab every 9 weeks. Zometa discontinued 2017.  Tamoxifen was changed to letrozole August 2019.    F. Xgeva discontinued 12-17-19 because of dental issues.   G. J+J vaccine March, 2021.  H. Was on Zometa once May 11.  Reaction with eye lid swelling. Discontinued Zometa.  H. Restart Xgeva 6-22-21.  Continuing the trastuzumab and letrozole.  Both  tolerated well.       INTERVAL HISTORY:  Hoda returns to clinic today and is feeling well.   -Tested positive for COVID after her last visit.  Took Paxlovid and developed metal taste in her mouth.   -She is feeling better.  -No new concerns except for as below.  -Left great toe paronychia - post-nail cutting.  No trauma and no s/s infection.  -She denies any new aches or bony pain.    -She reports that her energy is good.    -She denies fevers or chills or signs of systemic illness.    -She denies drenching night sweats or unexplained weight loss.    -She denies headache, dizziness or vision changes.    -She denies cough, chest pain, or shortness of breath at rest.    -She denies nausea, vomiting, abdominal pain or bowel changes.    -She continues on trastuzumab, letrozole and Xgeva, and is tolerating these well.    -She has no new dental concerns today. She is due for a dental check up.    ROS: 12 point ROS neg other than the symptoms noted above in the HPI.    PHYSICAL EXAMINATION:    /76   Pulse 77   Temp 97.9  F (36.6  C) (Oral)   Resp 16   Wt 81.3 kg (179 lb 3.2 oz)   SpO2 95%   BMI 32.56 kg/m    General: No acute distress  HEENT: Sclera anicteric.   Lymph: No cervical or supraclavicular lymphadenopathy noted.  Heart: Regular, rate, and rhythm.  Lungs: Clear to ascultation bilaterally.  Abdomen: Positive bowel sounds. Soft, non-distended, non-tender.   Extremities: No lower extremity edema  Neuro: Cranial nerves grossly intact  Rash: None noted on visible skin.     LABORATORY DATA:   Most Recent 3 CBC's:  Recent Labs   Lab Test 02/01/24  1036 01/11/24  0710 11/30/23  1105   WBC 7.5 8.5 7.5   HGB 12.3 12.5 13.1   MCV 94 94 93    240 181   ANEUTAUTO 4.0 4.1 3.8     Most Recent 3 BMP's:  Recent Labs   Lab Test 01/11/24  0710 11/30/23  1105 11/09/23  1001    142 143   POTASSIUM 4.1 4.3 4.1   CHLORIDE 103 105 106   CO2 29 28 29   BUN 13.9 13.5 10.8   CR 0.77 0.79 0.79   ANIONGAP 9 9 8    TAYLER 9.8 9.8 9.8   * 86 111*   PROTTOTAL 7.5 7.6 7.6   ALBUMIN 4.0 4.1 4.1    Most Recent 3 LFT's:  Recent Labs   Lab Test 01/11/24  0710 11/30/23  1105 11/09/23  1001   AST 40 57* 53*   ALT 49 60* 52*   ALKPHOS 48 48 52   BILITOTAL 0.3 0.3 0.3    Most Recent 2 TSH and T4:  Recent Labs   Lab Test 07/06/23  1216 12/09/21  1438   TSH 0.77 1.01   I reviewed the labs above.  CMP pending at the time of her visit.     IMAGING:  Complete Echo Adult: 1/11/2024     Left ventricular systolic function is normal. Visually LVEF 60-65%, Biplane  LVEF is 57%.  Global peak LV longitudinal strain is averaged at -20.7%. This is within  reported normal limits (normal <-18%).  Right ventricular global function is normal.  No significant valvular abnormalities.  The inferior vena cava was normal in size with preserved respiratory  variability.  There is no pericardial effusion.     This study was compared to a TTE from 10/19/2023. There has been no  significant change.    I reviewed the above labs and imaging today.     ASSESSMENT AND PLAN:       Hoda Brush is a 68-year-old woman with a history of metastatic ER-positive, FL-positive, HER2 positive breast cancer to the bones.  She is from [facility], formally from [facility], and came to live in the [place] with her daughter.   She had metastatic disease at the time of presentation with bone-only metastases by report.  She underwent neoadjuvant CAF, had a left mastectomy, left axillary lymph node dissection, radiation to the right hip, which included the right iliac region where she had metastatic disease.  She was initially on tamoxifen but then was switched to letrozole.  She continues on this and every 3-week trastuzumab.  She has had no evidence of disease progression for the last 3 years.  Markers are low and stable, pending today.  We have continued Herceptin every 3 weeks as well as daily letrozole. CT CAP shows stable pulmonary nodules and stable sclerotic bone  metastasis.   - Labs and clinically appropriate to continue herceptin and letrozole.   - 1/2024 echo overall stable with EF of 60%, no significant change from 07/2023 study.   - Continue CT CAP every 4 months, scheduled for April 2024.      Bone Metastasis   - Hx of R maxillary osteonecrosis has healed and she was cleared to restart Xgeva. Will continue every 3 months and administered 1/11/2024.  Per patient recent dental exam 08/16/23 with no concerns and no changes noted since that time.  She is due for another appt this month.   - R hip discomfort reported in her August visit improved overall.  She now only experiences this as mild discomfort when standing for long periods.  Resolves with position changes.  She will call with any new concerns.      ALT/AST elevation  - AST and ALT mildly elevated in the past.  CMP pending today but these were normal last month,  - Per chart review she has had intermittent elevations over the last several years.   - Most recent CT with no concerning findings; does have hepatic steatosis and is s/p cholecystectomy in 2020.   - Continue to closely monitor.    Paronychia:  - Left great toe, mild redness and swelling noted.  - Recommended soaks and topical antibiotic ointment three times/day x 7 days.  - She will call with worsening and at that time could consider oral antibiotic per protocol.  - Likely r/t recent nail trim and she has a history of ingrown toenail.  Discussed podiatry referral if persists/ worsens.     Follow up   - Continue herceptin and labs every three weeks with provider visits every 3-6 weeks.     Kassidy Robles, APRN, CNP  [facility]  [address] [place] [address] [phone]

## 2024-02-01 NOTE — NURSING NOTE
"Oncology Rooming Note    February 1, 2024 10:45 AM   Hoda Brush is a 68 year old female who presents for:    Chief Complaint   Patient presents with    Blood Draw     Port blood draw with heparin flush by lab RN. Vitals taken and appointment arrived    Breast Cancer     Initial Vitals: /76   Pulse 77   Temp 97.9  F (36.6  C) (Oral)   Resp 16   Wt 81.3 kg (179 lb 3.2 oz)   SpO2 95%   BMI 32.56 kg/m   Estimated body mass index is 32.56 kg/m  as calculated from the following:    Height as of 7/11/23: 1.58 m (5' 2.21\").    Weight as of this encounter: 81.3 kg (179 lb 3.2 oz). Body surface area is 1.89 meters squared.  No Pain (0) Comment: Data Unavailable   No LMP recorded. Patient is postmenopausal.  Allergies reviewed: Yes  Medications reviewed: Yes    Medications: Medication refills not needed today.  Pharmacy name entered into [employer]: [id number] [place], [place] - [address]    Frailty Screening:   Is the patient here for a new oncology consult visit in cancer care? 2. No      Clinical concerns:        Comfort Harris CMA              "

## 2024-02-01 NOTE — PROGRESS NOTES
Infusion Nursing Note:  Hoda Brush presents today for C109D1 Herceptin.    Patient seen by provider today: Yes: Kassidy CHA   present during visit today: Not Applicable.    Note: No complaints.      Intravenous Access:  Implanted Port.    Treatment Conditions:  Lab Results   Component Value Date    HGB 12.3 02/01/2024    WBC 7.5 02/01/2024    ANEU 3.1 06/22/2021    ANEUTAUTO 4.0 02/01/2024     02/01/2024        Results reviewed, labs MET treatment parameters, ok to proceed with treatment.  ECHO/MUGA completed 1/23/24  EF 60-65%.      Post Infusion Assessment:  Patient tolerated infusion without incident.  Blood return noted pre and post infusion.  Site patent and intact, free from redness, edema or discomfort.  No evidence of extravasations.  Access discontinued per protocol.       Discharge Plan:   Patient declined prescription refills.  Discharge instructions reviewed with: Patient.  Patient and/or family verbalized understanding of discharge instructions and all questions answered.  AVS to patient via [id number].  Patient will return 2/22/24 for next appointment.   Patient discharged in stable condition accompanied by: self.  Departure Mode: Ambulatory.      EMILY WICK RN

## 2024-02-01 NOTE — NURSING NOTE
Chief Complaint   Patient presents with    Blood Draw     Port blood draw with heparin flush by lab RN. Vitals taken and appointment arrived     Tanya De La Paz RN

## 2024-02-02 LAB — CANCER AG27-29 SERPL-ACNC: <9 U/ML

## 2024-02-04 ENCOUNTER — MYC MEDICAL ADVICE (OUTPATIENT)
Dept: ONCOLOGY | Facility: CLINIC | Age: 69
End: 2024-02-04
Payer: COMMERCIAL

## 2024-02-05 RX ORDER — CEPHALEXIN 500 MG/1
500 CAPSULE ORAL 4 TIMES DAILY
Qty: 20 CAPSULE | Refills: 0 | Status: SHIPPED | OUTPATIENT
Start: 2024-02-05 | End: 2024-05-14

## 2024-02-05 NOTE — CONFIDENTIAL NOTE
7:40 am: Called patient to follow up on symptoms sent through a Refinder by [employer] message on 2/4/24. Left a voicemail message requesting the patient call [phone], option 5, option 2 and speak with any Triage nurse available.

## 2024-02-05 NOTE — TELEPHONE ENCOUNTER
Call to pt daughter, Shyla, and informed her provider has placed a referral for podiatry and sent in a prescription for abx. Discussed with Shyla pt should take abx with food and should call back with worsening sx or concerns about abx. Provided pt daughter with podiatry scheduling number to call. Shyla verbalized understanding.

## 2024-02-05 NOTE — TELEPHONE ENCOUNTER
Pt daughter, Shyla, calling back to report pt sx are the same, they have not improved nor worsened. Still mild redness, swelling and discomfort.     Denies F/C, drainage    Pt is doing soaks w/Epson salts. Pt was using neosporin initially but has stopped.     Preferred pharmacy: [facility] in [place]

## 2024-02-12 ENCOUNTER — OFFICE VISIT (OUTPATIENT)
Dept: PODIATRY | Facility: CLINIC | Age: 69
End: 2024-02-12
Attending: NURSE PRACTITIONER
Payer: COMMERCIAL

## 2024-02-12 VITALS — DIASTOLIC BLOOD PRESSURE: 88 MMHG | BODY MASS INDEX: 32.71 KG/M2 | SYSTOLIC BLOOD PRESSURE: 134 MMHG | WEIGHT: 180 LBS

## 2024-02-12 DIAGNOSIS — L60.0 INGROWN TOENAIL OF LEFT FOOT: ICD-10-CM

## 2024-02-12 DIAGNOSIS — L03.032 PARONYCHIA OF TOE, LEFT: ICD-10-CM

## 2024-02-12 PROCEDURE — 99213 OFFICE O/P EST LOW 20 MIN: CPT | Mod: 25 | Performed by: PODIATRIST

## 2024-02-12 PROCEDURE — 11750 EXCISION NAIL&NAIL MATRIX: CPT | Mod: T5 | Performed by: PODIATRIST

## 2024-02-12 RX ORDER — SILVER SULFADIAZINE 10 MG/G
CREAM TOPICAL
Qty: 50 G | Refills: 0 | Status: SHIPPED | OUTPATIENT
Start: 2024-02-12

## 2024-02-12 NOTE — PROGRESS NOTES
Foot & Ankle Surgery   February 12, 2024    S:  Pt is seen today for evaluation of painful ingrown nail lateral left hallux.  She has undergone multiple procedures on the right great toe.    There were no vitals filed for this visit.'      ROS - Pos for CC.  Patient denies current nausea, vomiting, chills, fevers, belly pain, calf pain, chest pain or SOB.  Complete remainder of ROS it otherwise neg.      PE:  Gen:   No apparent distress  Eye:    Visual scanning without deficit  Ear:    Response to auditory stimuli wnl  Lung:    Non-labored breathing on RA noted  Abd:    NTND per patient report  Lymph:    Neg for pitting/non-pitting edema BLE  Vasc:    Pulses palpable, CFT minimally delayed  Neuro:    Light touch sensation intact to all sensory nerve distributions without paresthesias  Derm:    Onychocryptosis with low-grade paronychia lateral left hallux.  No purulence or cellulitis is seen but there is mild serous drainage  MSK:    ROM, strength wnl without limitation, no pain on palpation noted.  Calf:    Neg for redness, swelling or tenderness    Assessment:  68 year old female with onychocryptosis with paronychia lateral left hallux      Medical Decision Making/Plan:  Discussed etiologies, anatomy and options  1.  Onychocryptosis with paronychia lateral left hallux  -Regarding the nail, treatment options were discussed.  They elected to proceed with a procedure, Partial permanent avulsion.  See procedure note for details.  Risks that were discussed include but are not limited to infection, wound healing complications, nerve irritation, recurrence of the ingrown nail and the need for further procedures.  Antibiotic: None needed  -Rx for Silvadene was sent to patient's pharmacy for twice-daily dressing changes  -patient was advised to follow up in 4 weeks for a 30 minute appointment if the wound has failed to progress/heal, and we'll proceed with an I&D; otherwise, follow up prn    After discussing the procedure,  as well as risks, complications and post-procedure instructions, informed consent was obtained.    Anesthesia:  4 cc's of  1% lidocaine plain    Procedure:  After adequate prep, and with anesthesia achieved, a tourni-cot was applied to the involved toe(and removed after bandage application) and  attention was directed to the lateral border of the R hallux where the nail plate was freed from surrounding soft tissue.  The offending border was  using an English Anvil and then removed in total.  The base of the wound was explored and showed no necrotic tissue, purulence or debris.  Phenol was then applied to the base of the wound for 30s x 3, and sufficient isopropyl alcohol was used to irrigate the wound.  The base of the wound/ nail matrix was curetted after each acid application.  A clean dressing was applied loosely to prevent vascular insult.  The patient tolerated the procedure well without complications.    Post-procedural instructions were dispensed and discussed with the patient.  All questions were answered.       Follow up:  prn or sooner with acute issues           Aries Alfaro DPM [facility]  Podiatric Foot & Ankle Surgeon  [facility]  [phone]    Disclaimer: This note consists of symbols derived from keyboarding, dictation and/or voice recognition software. As a result, there may be errors in the script that have gone undetected. Please consider this when interpreting information found in this chart.

## 2024-02-12 NOTE — LETTER
2/12/2024         RE: Hoda Brush  [address]  [facility] [address]        Dear Colleague,    Thank you for referring your patient, Hoda Brush, to the [facility]. Please see a copy of my visit note below.    Foot & Ankle Surgery   February 12, 2024    S:  Pt is seen today for evaluation of painful ingrown nail lateral left hallux.  She has undergone multiple procedures on the right great toe.    There were no vitals filed for this visit.'      ROS - Pos for CC.  Patient denies current nausea, vomiting, chills, fevers, belly pain, calf pain, chest pain or SOB.  Complete remainder of ROS it otherwise neg.      PE:  Gen:   No apparent distress  Eye:    Visual scanning without deficit  Ear:    Response to auditory stimuli wnl  Lung:    Non-labored breathing on RA noted  Abd:    NTND per patient report  Lymph:    Neg for pitting/non-pitting edema BLE  Vasc:    Pulses palpable, CFT minimally delayed  Neuro:    Light touch sensation intact to all sensory nerve distributions without paresthesias  Derm:    Onychocryptosis with low-grade paronychia lateral left hallux.  No purulence or cellulitis is seen but there is mild serous drainage  MSK:    ROM, strength wnl without limitation, no pain on palpation noted.  Calf:    Neg for redness, swelling or tenderness    Assessment:  68 year old female with onychocryptosis with paronychia lateral left hallux      Medical Decision Making/Plan:  Discussed etiologies, anatomy and options  1.  Onychocryptosis with paronychia lateral left hallux  -Regarding the nail, treatment options were discussed.  They elected to proceed with a procedure, Partial permanent avulsion.  See procedure note for details.  Risks that were discussed include but are not limited to infection, wound healing complications, nerve irritation, recurrence of the ingrown nail and the need for further procedures.  Antibiotic: None needed  -Rx for Silvadene  was sent to patient's pharmacy for twice-daily dressing changes  -patient was advised to follow up in 4 weeks for a 30 minute appointment if the wound has failed to progress/heal, and we'll proceed with an I&D; otherwise, follow up prn    After discussing the procedure, as well as risks, complications and post-procedure instructions, informed consent was obtained.    Anesthesia:  4 cc's of  1% lidocaine plain    Procedure:  After adequate prep, and with anesthesia achieved, a tourni-cot was applied to the involved toe(and removed after bandage application) and  attention was directed to the lateral border of the R hallux where the nail plate was freed from surrounding soft tissue.  The offending border was  using an English Anvil and then removed in total.  The base of the wound was explored and showed no necrotic tissue, purulence or debris.  Phenol was then applied to the base of the wound for 30s x 3, and sufficient isopropyl alcohol was used to irrigate the wound.  The base of the wound/ nail matrix was curetted after each acid application.  A clean dressing was applied loosely to prevent vascular insult.  The patient tolerated the procedure well without complications.    Post-procedural instructions were dispensed and discussed with the patient.  All questions were answered.       Follow up:  prn or sooner with acute issues           Aries Alfaro DPM FACFAS FACFAOM  Podiatric Foot & Ankle Surgeon  [facility]  [phone]    Disclaimer: This note consists of symbols derived from keyboarding, dictation and/or voice recognition software. As a result, there may be errors in the script that have gone undetected. Please consider this when interpreting information found in this chart.        Again, thank you for allowing me to participate in the care of your patient.        Sincerely,        Aries Alfaro DPM, ERIC

## 2024-02-12 NOTE — PATIENT INSTRUCTIONS
Thank you for choosing [facility] Podiatry / Foot & Ankle Surgery!    DR. MEDINA'S CLINIC LOCATIONS:     [facility] (Friday) TRIAGE LINE: [phone]   [address] [facility]  APPOINTMENTS: [phone]   [place] [address] RADIOLOGY: [phone]    PHYSICAL THERAPY: [phone]    SET UP SURGERY: [phone]   [place] (Mon-Tues AM-Thurs) BILLING QUESTIONS: [phone]   [address] FAX: [phone]   [place] [address] [facility]: [phone]        - What is an ingrown toenail? An ingrown toenail is a medical condition usually caused by a nail edge irritating the neighboring soft tissue and skin. It can cause pain and lead to infection.  - What causes ingrown toenails? There are likely multiple causes of ingrown toenails, including nail shape and width, narrow shoes, a person s activity level, and likely family history of nail problems.  - Risks of not treating condition: If left untreated, some people endure ongoing tenderness and pain. The biggest concern is infection from bacteria entering through the damage soft tissue.  - How is it treated? Some people achieve relief by soaking their feet and trimming the edge of the nail. If an infection has developed, then an oral antibiotic can be prescribed, but the condition often returns after the course of the medication is completed. Often self treatment is not successful and treatment by a qualified medical provider is needed. This often involves numbing the toe with a local anesthetic and then either removing the edge of a nail or the entire nail.  - Risks of surgery? As with any form of surgery, infections is a risk. However, a person is probably at greater risk for infection if the ingrown toenail is left untreated. Nail removal is a common, simple, and fairly quick procedure that in most cases is done in the physician's office. If an infection exists, often the only treatment that is successful is removal.  "    Ingrown toenail Post-procedural instructions    - After the procedure, go home and elevate the involved foot for the remainder of the day/evening as able.  This is to minimize swelling, control pain, and limit post-procedural complications.  The pre-procedural injection may cause your toe to be numb anywhere from1-2 hours.    - You can take Tylenol, Ibuprofen, Advil, etc as needed for pain if tolerated.  Follow label instructions      - If you have been given a prescription for antibiotics, take them as instructed and complete the prescription.    - Keep dressing intact until the following morning.  At that point, you may remove the bandage (you may need to soak it in warm soapy water as the bandage will likely adhere to your skin).  Soaking in warm soapy water for 5-10 minutes will also facilitate healing.  Wash the toe thoroughly, dry the toe thoroughly.  Apply antibiotic wound ointment to base of wound and cover with 2x2 gauze pads and Coban dressing (not too tightly) until it stops draining(you'll need to purchase the 2x2 gauze pads and 1\" Coban rolls, especially if you had the permanent/chemical procedure performed).  This may take a few days to weeks, but at that point, you may continue with antibiotic ointment and a band-aid, or you may stop applying a dressing all together.  Dressing changes and soaks should be done twice daily(morning/evening) if you had the permanent/chemical procedure done.      -If you had the permanent procedure done, assess the toe at 4 weeks out from the procedure.  If the toe continues to heal/improve, continue twice-daily soaks and dressing changes, and on follow up is needed.  If the toe is no longer showing improvement at 4 weeks out from the procedure, follow up in clinic for a 30 minute appointment, and we'll clean out the procedure site.    - You may do activities to tolerance the following day.  Find a shoe that is comfortable and minimizes the amount of rubbing on your " toe, as this may increase pain, swelling, etc.  Utilize resting, icing, elevating and anti-inflammatories/Tylenol as needed.    - Monitor for signs of infection.  With this procedure, it is common to have mild surrounding redness and drainage.  If the redness involves the entire great toe or if you notice red streaks on top of your foot, or if you experience any nausea, vomiting, chills, fevers > 101 degrees, call clinic for a quick appointment.

## 2024-02-20 NOTE — PROGRESS NOTES
Feb 22, 2024    ONCOLOGY RETURN VISIT:     Hoda is a patient from [facility] and is seen here for continuation of care for her metastatic ER+HER2- breast cancer.  She is a refugee from [facility] and was initially seen in clinic with her daughter.  The only data we have from [facility] is an English translation of her records.       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  On 02/11/2014, she was seen by an oncologist.  The clinical staging of T4b N2 M1 was noted.   Her breast cancer was on the left side. There was no right breast cancer, confirmed by the patient and her daughter, correcting a possible error in the translated records.  ER was positive in 100% of the cells, HI at 14% and HER2 was 3+ positive.  It appears that this histopathologic information is on the mastectomy specimen. She underwent an MRI for staging of presumptive bone metastases which was performed 03/02/2014.  There were skeletal metastases in the thoracic vertebrae at 12, L1, L3, L5 and S1 vertebral body, ranging in size from 0.7-3.0 cm in size.  Ischial and right femur were also involved, as well as a large iliac mass measuring about 15 cm in the uterus. 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   Radiation Oncology consultation was performed 03/11/2014, and she was given radiation in 1 dose of 6 Gy to the right iliac lesion.        With the initial diagnosis, she initiated treatment with 6 cycles of CAF neoadjuvant chemotherapy 02/14, 07/07, 03/28, 04/18/14, 05/08 and 05/29/14. She also received monthly zoledronic acid.  She then underwent a modified left mastectomy of Palacios type and left lymphadenoectomy on 06/27/2014.   Pathologic examination showed number at [facility] was [id number] showed infiltrative grade 2 ductal cancer with 6 level 1 metastatic lymph nodes and 3 level 2 metastatic lymph nodes.  The differentiation was intermediate.  The tumor was  ER positive 70% of the cells, CO positive in 40% of the cells and HER2 was 3+ by immunohistochemistry and the Ki-67 labeling index was 20%. Her staging after surgery was stage IV, pT4b N2 M1.  I don't see a biopsy of the skeletal metastases.  She then had continuation with chemotherapy with 4 cycles of Herceptin and taxane with monthly zoledronic acid.  Tamoxifen was initiated.  She then had two years of Herceptin and a decision was made not to continue further Herceptin.  She continued on zoledronic acid every 3 months. She was clinically stable. [place] then moved to the [place] new refugee from [facility].  She has now been changed to letrozole.  Denosumab was held for new diagnosis of osteonecrosis.     History of cholecystectomy 2020.      TREATMENT HISTORY:  A. Initial diagnosis with metastatic breast cancer in [facility].  Neoadjuvant CAF x 6.   B. Left mastectomy. Left axillary node dissection.  C. Radiation in 1 dose to R iliac region.  C. Herceptin for 2 years taxane for a prescribed course then stopped, monthly zoledronic acid.  She had 2 years of Herceptin with tamoxifen added after chemotherapy.  D. Tamoxifen alone and zoledronic acid every 3 months starting 2014.  Letrozole was started   [place]  We restarted Herceptin every 3 weeks and continued tamoxifen. Bone targeted agent changed to denosumab every 9 weeks. Zometa discontinued 2017.  Tamoxifen was changed to letrozole August 2019.    F. Xgeva discontinued 12-17-19 because of dental issues.   G. J+J vaccine March, 2021.  H. Was on Zometa once May 11.  Reaction with eye lid swelling. Discontinued Zometa.  H. Restart Xgeva 6-22-21.  Continuing the trastuzumab and letrozole.  Both tolerated well.       INTERVAL HISTORY:  Hoda returns to clinic today and is feeling well.  She has no new concerns.  -It was her birthday earlier this week.  Her 's birthday is 1 day before, and they celebrated with family.  Her daughter made a beautiful cake and she  shared photos today.  -She feels she is fully recovered from her recent COVID infection.  -After her last visit, when she reported toenail bed infection, she was treated with antibiotics and referred to podiatry.  She did have a procedure, and continues to need to change the dressing on this twice daily.  She has had this done before, and feels that she will be fully healed within the next few weeks.  -She otherwise denies any new aches or bony pain.    -She reports that her energy is good.  Her activity has been a bit limited due to her recent toe procedure.  -She denies fevers or chills or signs of systemic illness.    -She denies drenching night sweats or unexplained weight loss.    -She denies headache, dizziness or vision changes.    -She denies cough, chest pain, or shortness of breath at rest.    -She denies nausea, vomiting, abdominal pain or bowel changes.    -She continues on trastuzumab, letrozole and Xgeva, and is tolerating these well.    -She has no new dental concerns today. She is due for a dental check up.    ROS: 12 point ROS neg other than the symptoms noted above in the HPI.    PHYSICAL EXAMINATION:    /77 (BP Location: Right arm, Patient Position: Sitting, Cuff Size: Adult Large)   Pulse 84   Temp 97.8  F (36.6  C) (Oral)   Resp 16   Wt 81.5 kg (179 lb 11.2 oz)   SpO2 92%   BMI 32.65 kg/m    General: No acute distress  HEENT: Sclera anicteric.   Lymph: No cervical or supraclavicular lymphadenopathy noted.  Heart: Regular, rate, and rhythm.  Lungs: Clear to ascultation bilaterally.  Abdomen: Positive bowel sounds. Soft, non-distended, non-tender.   Extremities: No lower extremity edema  Neuro: Cranial nerves grossly intact  Rash: None noted on visible skin.     LABORATORY DATA:   Most Recent 3 CBC's:  Recent Labs   Lab Test 02/22/24  1221 02/01/24  1036 01/11/24  0710   WBC 7.3 7.5 8.5   HGB 12.5 12.3 12.5   MCV 94 94 94    211 240   ANEUTAUTO 3.6 4.0 4.1     Most Recent 3  BMP's:  Recent Labs   Lab Test 02/22/24  1221 02/01/24  1036 01/11/24  0710    141 141   POTASSIUM 4.3 4.2 4.1   CHLORIDE 107 105 103   CO2 27 27 29   BUN 12.8 12.8 13.9   CR 0.81 0.78 0.77   ANIONGAP 9 9 9   TAYLER 9.7 9.8 9.8   * 93 100*   PROTTOTAL 7.5 7.4 7.5   ALBUMIN 4.0 4.1 4.0    Most Recent 3 LFT's:  Recent Labs   Lab Test 02/22/24  1221 02/01/24  1036 01/11/24  0710   AST 45 40 40   ALT 52* 49 49   ALKPHOS 45 46 48   BILITOTAL 0.3 0.3 0.3    Most Recent 2 TSH and T4:  Recent Labs   Lab Test 07/06/23  1216 12/09/21  1438   TSH 0.77 1.01   I reviewed the labs above.      IMAGING:  No new imaging today.     ASSESSMENT AND PLAN:       Hoda Brush is a 68-year-old woman with a history of metastatic ER-positive, IA-positive, HER2 positive breast cancer to the bones.  She is from [facility], formally from [facility], and came to live in the [place] with her daughter.   She had metastatic disease at the time of presentation with bone-only metastases by report.  She underwent neoadjuvant CAF, had a left mastectomy, left axillary lymph node dissection, radiation to the right hip, which included the right iliac region where she had metastatic disease.  She was initially on tamoxifen but then was switched to letrozole.  She continues on this and every 3-week trastuzumab.  She has had no evidence of disease progression for the last 3 years.  Markers are low and stable, pending today.  We have continued Herceptin every 3 weeks as well as daily letrozole. CT CAP shows stable pulmonary nodules and stable sclerotic bone metastasis.   - Labs and clinically appropriate to continue herceptin and letrozole.   - 1/2024 echo overall stable with EF of 60%, no significant change from 07/2023 study.   - Continue CT CAP every 4 months, scheduled for April 2024.      Bone Metastasis   - Hx of R maxillary osteonecrosis has healed and she was cleared to restart Xgeva. Will continue every 3 months and administered  1/11/2024.  Per patient recent dental exam 08/16/23 with no concerns and no changes noted since that time.  She is due for another appt this month.   - R hip discomfort reported in her August visit improved overall.  She now only experiences this as mild discomfort when standing for long periods.  Resolves with position changes.  She will call with any new concerns.      ALT/AST elevation  - AST and ALT mildly elevated in the past.  These were normal last month.  Today her ALT is 52.  - Per chart review she has had intermittent elevations over the last several years.   - Most recent CT with no concerning findings; does have hepatic steatosis and is s/p cholecystectomy in 2020.   - Continue to closely monitor.    Paronychia:  - Left great toe with ingrown nail and paronychia.  - Saw podiatrist and note reviewed.   - She is cleaning and changing dressing twice daily and will call podiatry with concerns.     Follow up   - Continue herceptin and labs every three weeks with provider visits every 3-6 weeks.     Kassidy Robles, APRN, CNP  [facility]  [address] [place] [address] [id number] [phone]

## 2024-02-22 ENCOUNTER — INFUSION THERAPY VISIT (OUTPATIENT)
Dept: ONCOLOGY | Facility: CLINIC | Age: 69
End: 2024-02-22
Attending: NURSE PRACTITIONER
Payer: COMMERCIAL

## 2024-02-22 ENCOUNTER — APPOINTMENT (OUTPATIENT)
Dept: LAB | Facility: CLINIC | Age: 69
End: 2024-02-22
Attending: NURSE PRACTITIONER
Payer: COMMERCIAL

## 2024-02-22 VITALS
OXYGEN SATURATION: 92 % | DIASTOLIC BLOOD PRESSURE: 77 MMHG | WEIGHT: 179.7 LBS | TEMPERATURE: 97.8 F | HEART RATE: 84 BPM | BODY MASS INDEX: 32.65 KG/M2 | SYSTOLIC BLOOD PRESSURE: 122 MMHG | RESPIRATION RATE: 16 BRPM

## 2024-02-22 DIAGNOSIS — Z79.899 ENCOUNTER FOR MONITORING CARDIOTOXIC DRUG THERAPY: ICD-10-CM

## 2024-02-22 DIAGNOSIS — Z51.81 ENCOUNTER FOR MONITORING CARDIOTOXIC DRUG THERAPY: ICD-10-CM

## 2024-02-22 DIAGNOSIS — C79.51 MALIGNANT NEOPLASM METASTATIC TO BONE (H): ICD-10-CM

## 2024-02-22 DIAGNOSIS — C50.912 MALIGNANT NEOPLASM OF LEFT FEMALE BREAST, UNSPECIFIED ESTROGEN RECEPTOR STATUS, UNSPECIFIED SITE OF BREAST (H): Primary | ICD-10-CM

## 2024-02-22 LAB
ALBUMIN SERPL BCG-MCNC: 4 G/DL (ref 3.5–5.2)
ALP SERPL-CCNC: 45 U/L (ref 40–150)
ALT SERPL W P-5'-P-CCNC: 52 U/L (ref 0–50)
ANION GAP SERPL CALCULATED.3IONS-SCNC: 9 MMOL/L (ref 7–15)
AST SERPL W P-5'-P-CCNC: 45 U/L (ref 0–45)
BASOPHILS # BLD AUTO: 0.1 10E3/UL (ref 0–0.2)
BASOPHILS NFR BLD AUTO: 1 %
BILIRUB SERPL-MCNC: 0.3 MG/DL
BUN SERPL-MCNC: 12.8 MG/DL (ref 8–23)
CALCIUM SERPL-MCNC: 9.7 MG/DL (ref 8.8–10.2)
CEA SERPL-MCNC: 1 NG/ML
CHLORIDE SERPL-SCNC: 107 MMOL/L (ref 98–107)
CREAT SERPL-MCNC: 0.81 MG/DL (ref 0.51–0.95)
DEPRECATED HCO3 PLAS-SCNC: 27 MMOL/L (ref 22–29)
EGFRCR SERPLBLD CKD-EPI 2021: 79 ML/MIN/1.73M2
EOSINOPHIL # BLD AUTO: 0.4 10E3/UL (ref 0–0.7)
EOSINOPHIL NFR BLD AUTO: 5 %
ERYTHROCYTE [DISTWIDTH] IN BLOOD BY AUTOMATED COUNT: 13.9 % (ref 10–15)
GLUCOSE SERPL-MCNC: 107 MG/DL (ref 70–99)
HCT VFR BLD AUTO: 39.9 % (ref 35–47)
HGB BLD-MCNC: 12.5 G/DL (ref 11.7–15.7)
IMM GRANULOCYTES # BLD: 0 10E3/UL
IMM GRANULOCYTES NFR BLD: 0 %
LYMPHOCYTES # BLD AUTO: 2.8 10E3/UL (ref 0.8–5.3)
LYMPHOCYTES NFR BLD AUTO: 38 %
MCH RBC QN AUTO: 29.4 PG (ref 26.5–33)
MCHC RBC AUTO-ENTMCNC: 31.3 G/DL (ref 31.5–36.5)
MCV RBC AUTO: 94 FL (ref 78–100)
MONOCYTES # BLD AUTO: 0.4 10E3/UL (ref 0–1.3)
MONOCYTES NFR BLD AUTO: 6 %
NEUTROPHILS # BLD AUTO: 3.6 10E3/UL (ref 1.6–8.3)
NEUTROPHILS NFR BLD AUTO: 50 %
NRBC # BLD AUTO: 0 10E3/UL
NRBC BLD AUTO-RTO: 0 /100
PLATELET # BLD AUTO: 228 10E3/UL (ref 150–450)
POTASSIUM SERPL-SCNC: 4.3 MMOL/L (ref 3.4–5.3)
PROT SERPL-MCNC: 7.5 G/DL (ref 6.4–8.3)
RBC # BLD AUTO: 4.25 10E6/UL (ref 3.8–5.2)
SODIUM SERPL-SCNC: 143 MMOL/L (ref 135–145)
WBC # BLD AUTO: 7.3 10E3/UL (ref 4–11)

## 2024-02-22 PROCEDURE — 82378 CARCINOEMBRYONIC ANTIGEN: CPT

## 2024-02-22 PROCEDURE — G0463 HOSPITAL OUTPT CLINIC VISIT: HCPCS | Performed by: NURSE PRACTITIONER

## 2024-02-22 PROCEDURE — 99214 OFFICE O/P EST MOD 30 MIN: CPT | Mod: 24 | Performed by: NURSE PRACTITIONER

## 2024-02-22 PROCEDURE — 82040 ASSAY OF SERUM ALBUMIN: CPT

## 2024-02-22 PROCEDURE — 258N000003 HC RX IP 258 OP 636: Performed by: NURSE PRACTITIONER

## 2024-02-22 PROCEDURE — 250N000011 HC RX IP 250 OP 636: Performed by: NURSE PRACTITIONER

## 2024-02-22 PROCEDURE — 86300 IMMUNOASSAY TUMOR CA 15-3: CPT

## 2024-02-22 PROCEDURE — 85025 COMPLETE CBC W/AUTO DIFF WBC: CPT

## 2024-02-22 PROCEDURE — 96413 CHEMO IV INFUSION 1 HR: CPT

## 2024-02-22 PROCEDURE — 36415 COLL VENOUS BLD VENIPUNCTURE: CPT

## 2024-02-22 RX ORDER — EPINEPHRINE 0.3 MG/.3ML
0.3 INJECTION SUBCUTANEOUS EVERY 5 MIN PRN
Status: CANCELLED | OUTPATIENT
Start: 2024-02-24

## 2024-02-22 RX ORDER — ALBUTEROL SULFATE 90 UG/1
1-2 AEROSOL, METERED RESPIRATORY (INHALATION)
Status: CANCELLED
Start: 2024-02-24

## 2024-02-22 RX ORDER — EPINEPHRINE 1 MG/ML
0.3 INJECTION, SOLUTION INTRAMUSCULAR; SUBCUTANEOUS EVERY 5 MIN PRN
Status: CANCELLED | OUTPATIENT
Start: 2024-02-24

## 2024-02-22 RX ORDER — HEPARIN SODIUM (PORCINE) LOCK FLUSH IV SOLN 100 UNIT/ML 100 UNIT/ML
5 SOLUTION INTRAVENOUS ONCE
Status: COMPLETED | OUTPATIENT
Start: 2024-02-22 | End: 2024-02-22

## 2024-02-22 RX ORDER — HEPARIN SODIUM (PORCINE) LOCK FLUSH IV SOLN 100 UNIT/ML 100 UNIT/ML
5 SOLUTION INTRAVENOUS EVERY 8 HOURS
Status: DISCONTINUED | OUTPATIENT
Start: 2024-02-22 | End: 2024-02-22 | Stop reason: HOSPADM

## 2024-02-22 RX ORDER — DIPHENHYDRAMINE HCL 25 MG
50 CAPSULE ORAL ONCE
Status: CANCELLED
Start: 2024-02-24

## 2024-02-22 RX ORDER — METHYLPREDNISOLONE SODIUM SUCCINATE 125 MG/2ML
125 INJECTION, POWDER, LYOPHILIZED, FOR SOLUTION INTRAMUSCULAR; INTRAVENOUS
Status: CANCELLED
Start: 2024-02-24

## 2024-02-22 RX ORDER — LORAZEPAM 2 MG/ML
0.5 INJECTION INTRAMUSCULAR EVERY 4 HOURS PRN
Status: CANCELLED
Start: 2024-02-24

## 2024-02-22 RX ORDER — SODIUM CHLORIDE 9 MG/ML
1000 INJECTION, SOLUTION INTRAVENOUS CONTINUOUS PRN
Status: CANCELLED
Start: 2024-02-24

## 2024-02-22 RX ORDER — ACETAMINOPHEN 325 MG/1
650 TABLET ORAL
Status: CANCELLED | OUTPATIENT
Start: 2024-02-24

## 2024-02-22 RX ORDER — HEPARIN SODIUM (PORCINE) LOCK FLUSH IV SOLN 100 UNIT/ML 100 UNIT/ML
5 SOLUTION INTRAVENOUS EVERY 8 HOURS
Status: CANCELLED | OUTPATIENT
Start: 2024-02-24

## 2024-02-22 RX ORDER — DIPHENHYDRAMINE HYDROCHLORIDE 50 MG/ML
50 INJECTION INTRAMUSCULAR; INTRAVENOUS
Status: CANCELLED
Start: 2024-02-24

## 2024-02-22 RX ORDER — ALBUTEROL SULFATE 0.83 MG/ML
2.5 SOLUTION RESPIRATORY (INHALATION)
Status: CANCELLED | OUTPATIENT
Start: 2024-02-24

## 2024-02-22 RX ADMIN — Medication 5 ML: at 14:27

## 2024-02-22 RX ADMIN — TRASTUZUMAB 450 MG: 150 INJECTION, POWDER, LYOPHILIZED, FOR SOLUTION INTRAVENOUS at 13:50

## 2024-02-22 RX ADMIN — Medication 5 ML: at 12:15

## 2024-02-22 RX ADMIN — SODIUM CHLORIDE 250 ML: 9 INJECTION, SOLUTION INTRAVENOUS at 13:18

## 2024-02-22 ASSESSMENT — PAIN SCALES - GENERAL: PAINLEVEL: NO PAIN (0)

## 2024-02-22 NOTE — PATIENT INSTRUCTIONS
[facility] Triage and after hours / weekends / holidays:  [phone]    Please call the triage or after hours line if you experience a temperature greater than or equal to 100.4, shaking chills, have uncontrolled nausea, vomiting and/or diarrhea, dizziness, shortness of breath, chest pain, bleeding, unexplained bruising, or if you have any other new/concerning symptoms, questions or concerns.      If you are having any concerning symptoms or wish to speak to a provider before your next infusion visit, please call triage to notify them so we can adequately serve you.     If you need a refill on a narcotic prescription or other medication, please call before your infusion appointment.                February 2024 Sunday Monday Tuesday Wednesday Thursday Friday Saturday                       1    LAB CENTRAL  10:00 AM   (15 min.)   [facility] DRAW   [facility]    RETURN CCSL  10:15 AM   (45 min.)   Kassidy Robles APRN CNP   [facility]    ONC INFUSION 1 HR (60 MIN)  11:30 AM   (60 min.)    ONC INFUSION NURSE   [facility] 2     3       4     5     6     7     8     9     10       11     12    PROCEDURE   1:15 PM   (30 min.)   Aries Alfaro DPM   [facility] 13     14     15     16     17       18  Happy Birthday!     19     20     21     22    LAB CENTRAL  12:00 PM   (15 min.)   [facility] DRAW   [facility]    RETURN CCSL  12:15 PM   (45 min.)   Kassidy Robles APRN CNP   [facility]    ONC INFUSION 1 HR (60 MIN)   1:30 PM   (60 min.)    ONC INFUSION NURSE   [facility] 23     24       25     26     27     28     29 March 2024 Sunday Monday Tuesday Wednesday Thursday Friday Saturday                            1     2       3     4     5     6     7      8     9       10     11     12     13     14    LAB CENTRAL  10:30 AM   (15 min.)   [facility] DRAW   [facility]    RETURN CCSL  10:55 AM   (30 min.)   Lisandro Aguiar MD   [facility]    ONC INFUSION 1 HR (60 MIN)  12:00 PM   (60 min.)    ONC INFUSION NURSE   [facility] 15     16       17     18     19     20     21     22     23       24     25     26     27     28     29     30       31                                                   Lab Results:  Recent Results (from the past 12 hour(s))   COMPREHENSIVE METABOLIC PANEL    Collection Time: 02/22/24 12:21 PM   Result Value Ref Range    Sodium 143 135 - 145 mmol/L    Potassium 4.3 3.4 - 5.3 mmol/L    Carbon Dioxide (CO2) 27 22 - 29 mmol/L    Anion Gap 9 7 - 15 mmol/L    Urea Nitrogen 12.8 8.0 - 23.0 mg/dL    Creatinine 0.81 0.51 - 0.95 mg/dL    GFR Estimate 79 >60 mL/min/1.73m2    Calcium 9.7 8.8 - 10.2 mg/dL    Chloride 107 98 - 107 mmol/L    Glucose 107 (H) 70 - 99 mg/dL    Alkaline Phosphatase 45 40 - 150 U/L    AST 45 0 - 45 U/L    ALT 52 (H) 0 - 50 U/L    Protein Total 7.5 6.4 - 8.3 g/dL    Albumin 4.0 3.5 - 5.2 g/dL    Bilirubin Total 0.3 <=1.2 mg/dL   CBC with platelets and differential    Collection Time: 02/22/24 12:21 PM   Result Value Ref Range    WBC Count 7.3 4.0 - 11.0 10e3/uL    RBC Count 4.25 3.80 - 5.20 10e6/uL    Hemoglobin 12.5 11.7 - 15.7 g/dL    Hematocrit 39.9 35.0 - 47.0 %    MCV 94 78 - 100 fL    MCH 29.4 26.5 - 33.0 pg    MCHC 31.3 (L) 31.5 - 36.5 g/dL    RDW 13.9 10.0 - 15.0 %    Platelet Count 228 150 - 450 10e3/uL    % Neutrophils 50 %    % Lymphocytes 38 %    % Monocytes 6 %    % Eosinophils 5 %    % Basophils 1 %    % Immature Granulocytes 0 %    NRBCs per 100 WBC 0 <1 /100    Absolute Neutrophils 3.6 1.6 - 8.3 10e3/uL    Absolute Lymphocytes 2.8 0.8 - 5.3 10e3/uL    Absolute Monocytes 0.4 0.0 - 1.3 10e3/uL    Absolute Eosinophils 0.4  0.0 - 0.7 10e3/uL    Absolute Basophils 0.1 0.0 - 0.2 10e3/uL    Absolute Immature Granulocytes 0.0 <=0.4 10e3/uL    Absolute NRBCs 0.0 10e3/uL

## 2024-02-22 NOTE — NURSING NOTE
"Oncology Rooming Note    February 22, 2024 12:31 PM   Hoda Brush is a 69 year old female who presents for:    Chief Complaint   Patient presents with    Port Draw     Labs drawn via port by RN, VS done    Oncology Clinic Visit     Malignant neoplasm of left breast      Initial Vitals: /77 (BP Location: Right arm, Patient Position: Sitting, Cuff Size: Adult Large)   Pulse 84   Temp 97.8  F (36.6  C) (Oral)   Resp 16   Wt 81.5 kg (179 lb 11.2 oz)   SpO2 92%   BMI 32.65 kg/m   Estimated body mass index is 32.65 kg/m  as calculated from the following:    Height as of 7/11/23: 1.58 m (5' 2.21\").    Weight as of this encounter: 81.5 kg (179 lb 11.2 oz). Body surface area is 1.89 meters squared.  No Pain (0) Comment: Data Unavailable   No LMP recorded. Patient is postmenopausal.  Allergies reviewed: Yes  Medications reviewed: Yes    Medications: Medication refills not needed today.  Pharmacy name entered into [employer]: [id number] [place], [place] - [address]    Frailty Screening:   Is the patient here for a new oncology consult visit in cancer care? 2. No      Clinical concerns:  none    Alejandra Landeros"

## 2024-02-22 NOTE — LETTER
2/22/2024         RE: Hoda Brush  [address]  [facility] [address]        Dear Colleague,    Thank you for referring your patient, Hoda Brush, to the [facility]. Please see a copy of my visit note below.    Feb 22, 2024    ONCOLOGY RETURN VISIT:     Hoda is a patient from [facility] and is seen here for continuation of care for her metastatic ER+HER2- breast cancer.  She is a refugee from [facility] and was initially seen in clinic with her daughter.  The only data we have from [facility] is an English translation of her records.       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  On 02/11/2014, she was seen by an oncologist.  The clinical staging of T4b N2 M1 was noted.   Her breast cancer was on the left side. There was no right breast cancer, confirmed by the patient and her daughter, correcting a possible error in the translated records.  ER was positive in 100% of the cells, FL at 14% and HER2 was 3+ positive.  It appears that this histopathologic information is on the mastectomy specimen. She underwent an MRI for staging of presumptive bone metastases which was performed 03/02/2014.  There were skeletal metastases in the thoracic vertebrae at 12, L1, L3, L5 and S1 vertebral body, ranging in size from 0.7-3.0 cm in size.  Ischial and right femur were also involved, as well as a large iliac mass measuring about 15 cm in the uterus. 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   Radiation Oncology consultation was performed 03/11/2014, and she was given radiation in 1 dose of 6 Gy to the right iliac lesion.        With the initial diagnosis, she initiated treatment with 6 cycles of CAF neoadjuvant chemotherapy 02/14, 07/07, 03/28, 04/18/14, 05/08 and 05/29/14. She also received monthly zoledronic acid.  She then underwent a modified left mastectomy of Palacios type and left  lymphadenoectomy on 06/27/2014.   Pathologic examination showed number at [facility] was [id number] showed infiltrative grade 2 ductal cancer with 6 level 1 metastatic lymph nodes and 3 level 2 metastatic lymph nodes.  The differentiation was intermediate.  The tumor was ER positive 70% of the cells, AL positive in 40% of the cells and HER2 was 3+ by immunohistochemistry and the Ki-67 labeling index was 20%. Her staging after surgery was stage IV, pT4b N2 M1.  I don't see a biopsy of the skeletal metastases.  She then had continuation with chemotherapy with 4 cycles of Herceptin and taxane with monthly zoledronic acid.  Tamoxifen was initiated.  She then had two years of Herceptin and a decision was made not to continue further Herceptin.  She continued on zoledronic acid every 3 months. She was clinically stable. [place] then moved to the [place] new [place] from [facility].  She has now been changed to letrozole.  Denosumab was held for new diagnosis of osteonecrosis.     History of cholecystectomy 2020.      TREATMENT HISTORY:  A. Initial diagnosis with metastatic breast cancer in [facility].  Neoadjuvant CAF x 6.   B. Left mastectomy. Left axillary node dissection.  C. Radiation in 1 dose to R iliac region.  C. Herceptin for 2 years taxane for a prescribed course then stopped, monthly zoledronic acid.  She had 2 years of Herceptin with tamoxifen added after chemotherapy.  D. Tamoxifen alone and zoledronic acid every 3 months starting 2014.  Letrozole was started   E. [place]  We restarted Herceptin every 3 weeks and continued tamoxifen. Bone targeted agent changed to denosumab every 9 weeks. Zometa discontinued 2017.  Tamoxifen was changed to letrozole August 2019.    F. Xgeva discontinued 12-17-19 because of dental issues.   G. J+J vaccine March, 2021.  H. Was on Zometa once May 11.  Reaction with eye lid swelling. Discontinued Zometa.  H. Restart Xgeva 6-22-21.  Continuing the trastuzumab and letrozole.  Both  tolerated well.       INTERVAL HISTORY:  Hoda returns to clinic today and is feeling well.  She has no new concerns.  -It was her birthday earlier this week.  Her 's birthday is 1 day before, and they celebrated with family.  Her daughter made a beautiful cake and she shared photos today.  -She feels she is fully recovered from her recent COVID infection.  -After her last visit, when she reported toenail bed infection, she was treated with antibiotics and referred to podiatry.  She did have a procedure, and continues to need to change the dressing on this twice daily.  She has had this done before, and feels that she will be fully healed within the next few weeks.  -She otherwise denies any new aches or bony pain.    -She reports that her energy is good.  Her activity has been a bit limited due to her recent toe procedure.  -She denies fevers or chills or signs of systemic illness.    -She denies drenching night sweats or unexplained weight loss.    -She denies headache, dizziness or vision changes.    -She denies cough, chest pain, or shortness of breath at rest.    -She denies nausea, vomiting, abdominal pain or bowel changes.    -She continues on trastuzumab, letrozole and Xgeva, and is tolerating these well.    -She has no new dental concerns today. She is due for a dental check up.    ROS: 12 point ROS neg other than the symptoms noted above in the HPI.    PHYSICAL EXAMINATION:    /77 (BP Location: Right arm, Patient Position: Sitting, Cuff Size: Adult Large)   Pulse 84   Temp 97.8  F (36.6  C) (Oral)   Resp 16   Wt 81.5 kg (179 lb 11.2 oz)   SpO2 92%   BMI 32.65 kg/m    General: No acute distress  HEENT: Sclera anicteric.   Lymph: No cervical or supraclavicular lymphadenopathy noted.  Heart: Regular, rate, and rhythm.  Lungs: Clear to ascultation bilaterally.  Abdomen: Positive bowel sounds. Soft, non-distended, non-tender.   Extremities: No lower extremity edema  Neuro: Cranial nerves  grossly intact  Rash: None noted on visible skin.     LABORATORY DATA:   Most Recent 3 CBC's:  Recent Labs   Lab Test 02/22/24  1221 02/01/24  1036 01/11/24  0710   WBC 7.3 7.5 8.5   HGB 12.5 12.3 12.5   MCV 94 94 94    211 240   ANEUTAUTO 3.6 4.0 4.1     Most Recent 3 BMP's:  Recent Labs   Lab Test 02/22/24  1221 02/01/24  1036 01/11/24  0710    141 141   POTASSIUM 4.3 4.2 4.1   CHLORIDE 107 105 103   CO2 27 27 29   BUN 12.8 12.8 13.9   CR 0.81 0.78 0.77   ANIONGAP 9 9 9   TAYLER 9.7 9.8 9.8   * 93 100*   PROTTOTAL 7.5 7.4 7.5   ALBUMIN 4.0 4.1 4.0    Most Recent 3 LFT's:  Recent Labs   Lab Test 02/22/24  1221 02/01/24  1036 01/11/24  0710   AST 45 40 40   ALT 52* 49 49   ALKPHOS 45 46 48   BILITOTAL 0.3 0.3 0.3    Most Recent 2 TSH and T4:  Recent Labs   Lab Test 07/06/23  1216 12/09/21  1438   TSH 0.77 1.01   I reviewed the labs above.      IMAGING:  No new imaging today.     ASSESSMENT AND PLAN:       Hoda Brush is a 68-year-old woman with a history of metastatic ER-positive, UT-positive, HER2 positive breast cancer to the bones.  She is from [facility], formally from [facility], and came to live in the [place] with [place].   She had metastatic disease at the time of presentation with bone-only metastases by report.  She underwent neoadjuvant CAF, had a left mastectomy, left axillary lymph node dissection, radiation to the right hip, which included the right iliac region where she had metastatic disease.  She was initially on tamoxifen but then was switched to letrozole.  She continues on this and every 3-week trastuzumab.  She has had no evidence of disease progression for the last 3 years.  Markers are low and stable, pending today.  We have continued Herceptin every 3 weeks as well as daily letrozole. CT CAP shows stable pulmonary nodules and stable sclerotic bone metastasis.   - Labs and clinically appropriate to continue herceptin and letrozole.   - 1/2024 echo overall stable  with EF of 60%, no significant change from 07/2023 study.   - Continue CT CAP every 4 months, scheduled for April 2024.      Bone Metastasis   - Hx of R maxillary osteonecrosis has healed and she was cleared to restart Xgeva. Will continue every 3 months and administered 1/11/2024.  Per patient recent dental exam 08/16/23 with no concerns and no changes noted since that time.  She is due for another appt this month.   - R hip discomfort reported in her August visit improved overall.  She now only experiences this as mild discomfort when standing for long periods.  Resolves with position changes.  She will call with any new concerns.      ALT/AST elevation  - AST and ALT mildly elevated in the past.  These were normal last month.  Today her ALT is 52.  - Per chart review she has had intermittent elevations over the last several years.   - Most recent CT with no concerning findings; does have hepatic steatosis and is s/p cholecystectomy in 2020.   - Continue to closely monitor.    Paronychia:  - Left great toe with ingrown nail and paronychia.  - Saw podiatrist and note reviewed.   - She is cleaning and changing dressing twice daily and will call podiatry with concerns.     Follow up   - Continue herceptin and labs every three weeks with provider visits every 3-6 weeks.     KATHI Valencia, CNP  [facility]  [address] [place] [address] [phone]

## 2024-02-22 NOTE — PROGRESS NOTES
Infusion Nursing Note:  Hoda Brush presents today for cycle 110 day 1 herceptin.    Patient seen by provider today: Yes: Kassidy Robles CNP   present during visit today: Not Applicable.    Note: Pt presents today generally feeling well. She was seen in clinic by Kassidy Robles CNP. Pt wishes to proceed with planned treatment.    Intravenous Access:  Implanted Port.    Treatment Conditions:  Lab Results   Component Value Date    HGB 12.5 02/22/2024    WBC 7.3 02/22/2024    ANEU 3.1 06/22/2021    ANEUTAUTO 3.6 02/22/2024     02/22/2024     Lab Results   Component Value Date     02/22/2024    POTASSIUM 4.3 02/22/2024    CR 0.81 02/22/2024    TAYLER 9.7 02/22/2024    BILITOTAL 0.3 02/22/2024    ALBUMIN 4.0 02/22/2024    ALT 52 (H) 02/22/2024    AST 45 02/22/2024   Results reviewed, labs MET treatment parameters, ok to proceed with treatment.  ECHO/MUGA completed 1/23/24  EF 60-65%.    Post Infusion Assessment:  Patient tolerated infusion without incident.  Blood return noted pre and post infusion.  Site patent and intact, free from redness, edema or discomfort.  No evidence of extravasations.  Access discontinued per protocol.     Discharge Plan:   Patient declined prescription refills.  Discharge instructions reviewed with: Patient.  Patient and/or family verbalized understanding of discharge instructions and all questions answered.  AVS to patient via [employer].  Patient will return 03/14/24 for next appointment.   Patient discharged in stable condition accompanied by: self.  Departure Mode: Ambulatory.      Emily Meese, RN

## 2024-02-24 LAB — CANCER AG27-29 SERPL-ACNC: <9 U/ML

## 2024-03-10 NOTE — PROGRESS NOTES
Hoda is a patient from [facility] and is seen here for continuation of care for her metastatic ER+HER2- breast cancer.  She is a refugee from [facility] and was initially seen in clinic with her daughter.  The only data we have from [facility] is an English translation of her records.       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  On 02/11/2014, she was seen by an oncologist.  The clinical staging of T4b N2 M1 was noted.   Her breast cancer was on the left side. There was no right breast cancer, confirmed by the patient and her daughter, correcting a possible error in the translated records.  ER was positive in 100% of the cells, AL at 14% and HER2 was 3+ positive.  It appears that this histopathologic information is on the mastectomy specimen. She underwent an MRI for staging of presumptive bone metastases which was performed 03/02/2014.  There were skeletal metastases in the thoracic vertebrae at 12, L1, L3, L5 and S1 vertebral body, ranging in size from 0.7-3.0 cm in size.  Ischial and right femur were also involved, as well as a large iliac mass measuring about 15 cm in the uterus. 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   Radiation Oncology consultation was performed 03/11/2014, and she was given radiation in 1 dose of 6 Gy to the right iliac lesion.        With the initial diagnosis, she initiated treatment with 6 cycles of CAF neoadjuvant chemotherapy 02/14, 07/07, 03/28, 04/18/14, 05/08 and 05/29/14. She also received monthly zoledronic acid.  She then underwent a modified left mastectomy of Palacios type and left lymphadenoectomy on 06/27/2014.   Pathologic examination showed number at [facility] was [id number] showed infiltrative grade 2 ductal cancer with 6 level 1 metastatic lympFollow up with Jossie for visits and trastuzumab on 2-5, 2-26, 3-19 with CBC, CMP.  Echocardiogram on 3-19.  Follow up with me 4-9 with CBC,  CMP, CA27.29 and CEA and with CT CAP on 4-8.h nodes and 3 level 2 metastatic lymph nodes.  The differentiation was intermediate.  The tumor was ER positive 70% of the cells, OR positive in 40% of the cells and HER2 was 3+ by immunohistochemistry and the Ki-67 labeling index was 20%. Her staging after surgery was stage IV, pT4b N2 M1.  I don't see a biopsy of the skeletal metastases.  She then had continuation with chemotherapy with 4 cycles of Herceptin and taxane with monthly zoledronic acid.  Tamoxifen was initiated.  She then had two years of Herceptin and a decision was made not to continue further Herceptin.  She continued on zoledronic acid every 3 months. She was clinically stable. [place] then moved to the [place] new refugee from [facility].  She has now been changed to letrozole.  Denosumab has now been held for new diagnosis of osteonecrosis of the R maxilla.     History of cholecystectomy 2020.      TREATMENT HISTORY:  A. Initial diagnosis with metastatic breast cancer in [facility].  Neoadjuvant CAF x 6.   B. Left mastectomy. Left axillary node dissection.  C.  Radiation in 1 dose to R iliac region.  C. Herceptin for 2 years taxane for a prescribed course then stopped, monthly zoledronic acid.  She had 2 years of Herceptin with tamoxifen added after chemotherapy.  D.  Tamoxifen alone and zoledronic acid every 3 months starting 2014.  Letrozole was started   E[place]  [place]  We restarted Herceptin every 3 weeks and continued tamoxifen. Bone targeted agent changed to denosumab every 9 weeks. Zometa discontinued 2017.  Tamoxifen was changed to letrozole August 2019.    F.  Xgeva discontinued 12-17-19 because of dental issues.   G.  J+J vaccine March, 2021.  H.  Was on Zometa once May 11.  Reaction with eye lid swelling. Discontinued Zometa.  H.  Restart Xgeva 6-22-21.  Continuing the trastuzumab and letrozole.  Both tolerated well.       INTERVAL HISTORY  She denies pain, fatigue, depression or anxiety.  She  continues on trastuzumab, letrozole and Xgeva.  No pain, mild fatigue, no depression, no anxiety.  She does have an upper respiratory infection for which she is gradually recovering.  She been sick for 2 weeks.  She also had headache associated with the upper respiratory infection and symptoms of a cough and nasal congestion.  She tested node negative for COVID.     REVIEW OF SYSTEMS:  A 10-point review of systems is entirely negative.     She is a eating a Mediterranean-style diet.  She is exercising by swimming.  I did advise adding a weightbearing exercise.  She takes vitamin D3 2000 International Units per day and calcium.  Her last DEXA scan performed a week ago shows a most valid negative T-score of -2.5 in the left hip, consistent with osteoporosis.     Notably, she has had no bowel or bladder problems.     PHYSICAL EXAMINATION:    VITALS:  /72 (BP Location: Right arm, Patient Position: Sitting, Cuff Size: Adult Large)   Pulse 77   Temp 98.2  F (36.8  C) (Oral)   Resp 18   Wt 80.1 kg (176 lb 8 oz)   SpO2 94%   BMI 32.07 kg/m    GENERAL:  Hoda appeared generally well.  HEENT:  She has no alopecia.  Examination of oropharynx reveals good dentition.  LYMPH:  There is no cervical, supraclavicular, subclavicular or axillary lymphadenopathy.  BREASTS: Breast exam deferred today.  LUNGS:  Clear to percussion and auscultation.  CARDIAC:  Heart has a regular rate and rhythm, S1, S2.  ABDOMEN:  Soft, nontender, without hepatosplenomegaly.  EXTREMITIES:  Without edema.  PSYCH:  Mood and affect were normal.     LABORATORY DATA:  CMP and CBC within normal limits.  CEA, CA27.29 pending.      CT Chest/Abdomen/Pelvis 4/12/23  IMPRESSION:  1.  Stable appearance of sclerotic osseous lesions.  2.  Stable small pulmonary nodules.  3.  No new disease in the chest, abdomen and pelvis.        ASSESSMENT AND PLAN:       1.  Hoda Brush is a 68-year-old woman with a history of ER-positive, MI-positive, HER2  positive breast cancer.  She is from [facility], formally from [facility], and came to live in the [place] with [place].  The tumor is ER positive, WA positive and HER2 positive.  She had metastatic disease at the time of presentation with bone-only metastases by report.  She underwent neoadjuvant CAF, had a left mastectomy, left axillary lymph node dissection, radiation to the right hip, which included the right iliac region where she had metastatic disease.  She was initially on tamoxifen but then was switched to letrozole.  She continues on this and every 3-week trastuzumab.  She has had no evidence of disease progression for the last 3 years.  Markers are low and stable.  We have continued Herceptin every 3 weeks as well as daily letrozole. CT CAP shows stable pulmonary nodules.   2.  Hoda Brush continues to do well on a combination of trastuzumab and letrozole. She has no evidence of disease progression.   Her ejection fraction remains stable.   She continues to do well on trastuzumab and letrozole.  She has no evidence of disease progression.  She will continue with every 4-month CT of the chest, abdomen and pelvis.    3.   The right maxillary osteonecrosis has healed and she is able to continue with Xgeva every 3 months. Hold the Xgeva and repeat Dexa scan in 2025.    4.  Continue the letrozole and trastuzuamb.  Letrozole has been well-tolerated, as has trastuzuamb.  No significant joint stiffness.  5.  Echo. Stable EF.  60-65%.  6.  Discussion of bone health and right maxillary osteonecrosis.  All healed.   Xgeva is being continued.   7. Follow up.  Continue Herceptin through the end of 2025 alternating providers every 6 weeks. CT CAP April 18 and follow up with me April 25.   Echo July 18.  Discontinue Xgeva.  Dexa in 2025.  CBC, CMP every 3 weeks and CA27.29 and CEA every 6 weeks. Echo every 3 months, next on January 23.  Echo every 3 months and CT 4 months       Thank you for allowing us to  participate in this patient's care.       Sincerely,      Lisandro Aguiar MD  Professor  [facility]  [phone].                  I spent 35 minutes with the patient more than 50% of which was in counseling and coordination of care.

## 2024-03-14 ENCOUNTER — APPOINTMENT (OUTPATIENT)
Dept: LAB | Facility: CLINIC | Age: 69
End: 2024-03-14
Attending: INTERNAL MEDICINE
Payer: COMMERCIAL

## 2024-03-14 ENCOUNTER — ONCOLOGY VISIT (OUTPATIENT)
Dept: ONCOLOGY | Facility: CLINIC | Age: 69
End: 2024-03-14
Attending: INTERNAL MEDICINE
Payer: COMMERCIAL

## 2024-03-14 VITALS
TEMPERATURE: 98.2 F | WEIGHT: 176.5 LBS | BODY MASS INDEX: 32.07 KG/M2 | SYSTOLIC BLOOD PRESSURE: 108 MMHG | RESPIRATION RATE: 18 BRPM | DIASTOLIC BLOOD PRESSURE: 72 MMHG | HEART RATE: 77 BPM | OXYGEN SATURATION: 94 %

## 2024-03-14 DIAGNOSIS — C50.912 MALIGNANT NEOPLASM OF LEFT FEMALE BREAST, UNSPECIFIED ESTROGEN RECEPTOR STATUS, UNSPECIFIED SITE OF BREAST (H): Primary | ICD-10-CM

## 2024-03-14 DIAGNOSIS — C79.51 MALIGNANT NEOPLASM METASTATIC TO BONE (H): ICD-10-CM

## 2024-03-14 LAB
ALBUMIN SERPL BCG-MCNC: 4 G/DL (ref 3.5–5.2)
ALP SERPL-CCNC: 47 U/L (ref 40–150)
ALT SERPL W P-5'-P-CCNC: 40 U/L (ref 0–50)
ANION GAP SERPL CALCULATED.3IONS-SCNC: 10 MMOL/L (ref 7–15)
AST SERPL W P-5'-P-CCNC: 37 U/L (ref 0–45)
BASOPHILS # BLD AUTO: 0 10E3/UL (ref 0–0.2)
BASOPHILS NFR BLD AUTO: 1 %
BILIRUB SERPL-MCNC: 0.3 MG/DL
BUN SERPL-MCNC: 15.5 MG/DL (ref 8–23)
CALCIUM SERPL-MCNC: 9.1 MG/DL (ref 8.8–10.2)
CEA SERPL-MCNC: 1 NG/ML
CHLORIDE SERPL-SCNC: 106 MMOL/L (ref 98–107)
CREAT SERPL-MCNC: 0.82 MG/DL (ref 0.51–0.95)
DEPRECATED HCO3 PLAS-SCNC: 27 MMOL/L (ref 22–29)
EGFRCR SERPLBLD CKD-EPI 2021: 77 ML/MIN/1.73M2
EOSINOPHIL # BLD AUTO: 0.3 10E3/UL (ref 0–0.7)
EOSINOPHIL NFR BLD AUTO: 4 %
ERYTHROCYTE [DISTWIDTH] IN BLOOD BY AUTOMATED COUNT: 13.4 % (ref 10–15)
GLUCOSE SERPL-MCNC: 94 MG/DL (ref 70–99)
HCT VFR BLD AUTO: 40.4 % (ref 35–47)
HGB BLD-MCNC: 12.9 G/DL (ref 11.7–15.7)
IMM GRANULOCYTES # BLD: 0 10E3/UL
IMM GRANULOCYTES NFR BLD: 0 %
LYMPHOCYTES # BLD AUTO: 3 10E3/UL (ref 0.8–5.3)
LYMPHOCYTES NFR BLD AUTO: 42 %
MCH RBC QN AUTO: 29.7 PG (ref 26.5–33)
MCHC RBC AUTO-ENTMCNC: 31.9 G/DL (ref 31.5–36.5)
MCV RBC AUTO: 93 FL (ref 78–100)
MONOCYTES # BLD AUTO: 0.4 10E3/UL (ref 0–1.3)
MONOCYTES NFR BLD AUTO: 6 %
NEUTROPHILS # BLD AUTO: 3.5 10E3/UL (ref 1.6–8.3)
NEUTROPHILS NFR BLD AUTO: 47 %
NRBC # BLD AUTO: 0 10E3/UL
NRBC BLD AUTO-RTO: 0 /100
PLATELET # BLD AUTO: 200 10E3/UL (ref 150–450)
POTASSIUM SERPL-SCNC: 4.2 MMOL/L (ref 3.4–5.3)
PROT SERPL-MCNC: 7.5 G/DL (ref 6.4–8.3)
RBC # BLD AUTO: 4.34 10E6/UL (ref 3.8–5.2)
SODIUM SERPL-SCNC: 143 MMOL/L (ref 135–145)
WBC # BLD AUTO: 7.2 10E3/UL (ref 4–11)

## 2024-03-14 PROCEDURE — 82378 CARCINOEMBRYONIC ANTIGEN: CPT | Performed by: INTERNAL MEDICINE

## 2024-03-14 PROCEDURE — 258N000003 HC RX IP 258 OP 636: Performed by: INTERNAL MEDICINE

## 2024-03-14 PROCEDURE — 85025 COMPLETE CBC W/AUTO DIFF WBC: CPT | Performed by: INTERNAL MEDICINE

## 2024-03-14 PROCEDURE — 96413 CHEMO IV INFUSION 1 HR: CPT

## 2024-03-14 PROCEDURE — 250N000011 HC RX IP 250 OP 636: Performed by: INTERNAL MEDICINE

## 2024-03-14 PROCEDURE — G0463 HOSPITAL OUTPT CLINIC VISIT: HCPCS | Performed by: INTERNAL MEDICINE

## 2024-03-14 PROCEDURE — 36591 DRAW BLOOD OFF VENOUS DEVICE: CPT | Performed by: INTERNAL MEDICINE

## 2024-03-14 PROCEDURE — 99214 OFFICE O/P EST MOD 30 MIN: CPT | Performed by: INTERNAL MEDICINE

## 2024-03-14 PROCEDURE — 86300 IMMUNOASSAY TUMOR CA 15-3: CPT | Performed by: INTERNAL MEDICINE

## 2024-03-14 PROCEDURE — 82247 BILIRUBIN TOTAL: CPT | Performed by: INTERNAL MEDICINE

## 2024-03-14 PROCEDURE — 250N000011 HC RX IP 250 OP 636: Mod: JZ | Performed by: INTERNAL MEDICINE

## 2024-03-14 RX ORDER — ALBUTEROL SULFATE 0.83 MG/ML
2.5 SOLUTION RESPIRATORY (INHALATION)
Status: CANCELLED | OUTPATIENT
Start: 2024-04-04

## 2024-03-14 RX ORDER — SODIUM CHLORIDE 9 MG/ML
1000 INJECTION, SOLUTION INTRAVENOUS CONTINUOUS PRN
Status: CANCELLED
Start: 2024-04-04

## 2024-03-14 RX ORDER — METHYLPREDNISOLONE SODIUM SUCCINATE 125 MG/2ML
125 INJECTION, POWDER, LYOPHILIZED, FOR SOLUTION INTRAMUSCULAR; INTRAVENOUS
Status: CANCELLED
Start: 2024-04-04

## 2024-03-14 RX ORDER — LORAZEPAM 2 MG/ML
0.5 INJECTION INTRAMUSCULAR EVERY 4 HOURS PRN
Status: CANCELLED
Start: 2024-03-14

## 2024-03-14 RX ORDER — EPINEPHRINE 1 MG/ML
0.3 INJECTION, SOLUTION INTRAMUSCULAR; SUBCUTANEOUS EVERY 5 MIN PRN
Status: CANCELLED | OUTPATIENT
Start: 2024-04-04

## 2024-03-14 RX ORDER — EPINEPHRINE 0.3 MG/.3ML
0.3 INJECTION SUBCUTANEOUS EVERY 5 MIN PRN
Status: CANCELLED | OUTPATIENT
Start: 2024-04-04

## 2024-03-14 RX ORDER — ALBUTEROL SULFATE 0.83 MG/ML
2.5 SOLUTION RESPIRATORY (INHALATION)
Status: CANCELLED | OUTPATIENT
Start: 2024-03-14

## 2024-03-14 RX ORDER — HEPARIN SODIUM (PORCINE) LOCK FLUSH IV SOLN 100 UNIT/ML 100 UNIT/ML
5 SOLUTION INTRAVENOUS EVERY 8 HOURS
Status: CANCELLED | OUTPATIENT
Start: 2024-03-14

## 2024-03-14 RX ORDER — DIPHENHYDRAMINE HCL 25 MG
50 CAPSULE ORAL ONCE
Status: CANCELLED
Start: 2024-03-14

## 2024-03-14 RX ORDER — HEPARIN SODIUM (PORCINE) LOCK FLUSH IV SOLN 100 UNIT/ML 100 UNIT/ML
5 SOLUTION INTRAVENOUS EVERY 8 HOURS
Status: DISCONTINUED | OUTPATIENT
Start: 2024-03-14 | End: 2024-03-14 | Stop reason: HOSPADM

## 2024-03-14 RX ORDER — EPINEPHRINE 1 MG/ML
0.3 INJECTION, SOLUTION INTRAMUSCULAR; SUBCUTANEOUS EVERY 5 MIN PRN
Status: CANCELLED | OUTPATIENT
Start: 2024-03-14

## 2024-03-14 RX ORDER — ACETAMINOPHEN 325 MG/1
650 TABLET ORAL
Status: CANCELLED | OUTPATIENT
Start: 2024-03-14

## 2024-03-14 RX ORDER — DIPHENHYDRAMINE HYDROCHLORIDE 50 MG/ML
50 INJECTION INTRAMUSCULAR; INTRAVENOUS
Status: CANCELLED
Start: 2024-04-04

## 2024-03-14 RX ORDER — EPINEPHRINE 0.3 MG/.3ML
0.3 INJECTION SUBCUTANEOUS EVERY 5 MIN PRN
Status: CANCELLED | OUTPATIENT
Start: 2024-03-14

## 2024-03-14 RX ORDER — ALBUTEROL SULFATE 90 UG/1
1-2 AEROSOL, METERED RESPIRATORY (INHALATION)
Status: CANCELLED
Start: 2024-04-04

## 2024-03-14 RX ORDER — LORAZEPAM 2 MG/ML
0.5 INJECTION INTRAMUSCULAR EVERY 4 HOURS PRN
Status: CANCELLED
Start: 2024-04-04

## 2024-03-14 RX ORDER — ACETAMINOPHEN 325 MG/1
650 TABLET ORAL
Status: CANCELLED | OUTPATIENT
Start: 2024-04-04

## 2024-03-14 RX ORDER — ALBUTEROL SULFATE 90 UG/1
1-2 AEROSOL, METERED RESPIRATORY (INHALATION)
Status: CANCELLED
Start: 2024-03-14

## 2024-03-14 RX ORDER — SODIUM CHLORIDE 9 MG/ML
1000 INJECTION, SOLUTION INTRAVENOUS CONTINUOUS PRN
Status: CANCELLED
Start: 2024-03-14

## 2024-03-14 RX ORDER — DIPHENHYDRAMINE HCL 25 MG
50 CAPSULE ORAL ONCE
Status: CANCELLED
Start: 2024-04-04

## 2024-03-14 RX ORDER — HEPARIN SODIUM (PORCINE) LOCK FLUSH IV SOLN 100 UNIT/ML 100 UNIT/ML
5 SOLUTION INTRAVENOUS EVERY 8 HOURS
Status: CANCELLED | OUTPATIENT
Start: 2024-04-04

## 2024-03-14 RX ORDER — METHYLPREDNISOLONE SODIUM SUCCINATE 125 MG/2ML
125 INJECTION, POWDER, LYOPHILIZED, FOR SOLUTION INTRAMUSCULAR; INTRAVENOUS
Status: CANCELLED
Start: 2024-03-14

## 2024-03-14 RX ORDER — HEPARIN SODIUM (PORCINE) LOCK FLUSH IV SOLN 100 UNIT/ML 100 UNIT/ML
5 SOLUTION INTRAVENOUS ONCE
Status: COMPLETED | OUTPATIENT
Start: 2024-03-14 | End: 2024-03-14

## 2024-03-14 RX ORDER — DIPHENHYDRAMINE HYDROCHLORIDE 50 MG/ML
50 INJECTION INTRAMUSCULAR; INTRAVENOUS
Status: CANCELLED
Start: 2024-03-14

## 2024-03-14 RX ADMIN — Medication 5 ML: at 11:00

## 2024-03-14 RX ADMIN — SODIUM CHLORIDE 250 ML: 9 INJECTION, SOLUTION INTRAVENOUS at 12:55

## 2024-03-14 RX ADMIN — TRASTUZUMAB 450 MG: 150 INJECTION, POWDER, LYOPHILIZED, FOR SOLUTION INTRAVENOUS at 12:55

## 2024-03-14 RX ADMIN — Medication 5 ML: at 13:25

## 2024-03-14 ASSESSMENT — PAIN SCALES - GENERAL: PAINLEVEL: NO PAIN (0)

## 2024-03-14 NOTE — PROGRESS NOTES
Infusion Nursing Note:  Hoda Brush presents today for Cycle 111 Day 1 Herceptin.    Patient seen by provider today: Yes: Dr. Aguiar   present during visit today: Not Applicable.    Note: Patient presents to infusion today doing well. No new questions or concerns following her visit with Dr. Aguiar.    Intravenous Access:  Implanted Port.    Treatment Conditions:  Lab Results   Component Value Date    HGB 12.9 03/14/2024    WBC 7.2 03/14/2024    ANEU 3.1 06/22/2021    ANEUTAUTO 3.5 03/14/2024     03/14/2024        Lab Results   Component Value Date     03/14/2024    POTASSIUM 4.2 03/14/2024    CR 0.82 03/14/2024    TAYLER 9.1 03/14/2024    BILITOTAL 0.3 03/14/2024    ALBUMIN 4.0 03/14/2024    ALT 40 03/14/2024    AST 37 03/14/2024     Results reviewed, labs MET treatment parameters, ok to proceed with treatment.  ECHO/MUGA completed 1/23/2024 EF 60-65%.    Post Infusion Assessment:  Patient tolerated infusion without incident.  Blood return noted pre and post infusion.  Site patent and intact, free from redness, edema or discomfort.  No evidence of extravasations.  Access discontinued per protocol.     Discharge Plan:   Patient declined prescription refills.  Discharge instructions reviewed with: Patient.  Patient and/or family verbalized understanding of discharge instructions and all questions answered.  AVS to patient via [employer].  Patient will return 3/28 for next appointment.   Patient discharged in stable condition accompanied by: self.  Departure Mode: Ambulatory.      Maria Fernanda Gao RN

## 2024-03-14 NOTE — PATIENT INSTRUCTIONS
Contact Numbers  [facility]: [phone] (for symptom and scheduling needs)    Please call the [facility] line if you experience a temperature greater than or equal to 100.4, shaking chills, have uncontrolled nausea, vomiting and/or diarrhea, dizziness, shortness of breath, chest pain, bleeding, unexplained bruising, or if you have any other new/concerning symptoms, questions or concerns.     If you are having any concerning symptoms or wish to speak to a provider before your next infusion visit, please call your care coordinator or triage to notify them so we can adequately serve you.     If you need a refill on a narcotic prescription or other medication, please call triage before your infusion appointment.           March 2024 Sunday Monday Tuesday Wednesday Thursday Friday Saturday                            1     2       3     4     5     6     7     8     9       10     11     12     13     14    LAB CENTRAL  10:30 AM   (15 min.)   [facility] LAB DRAW   [facility]    RETURN CCSL  10:55 AM   (30 min.)   Lisandro Aguiar MD   [facility]    ONC INFUSION 1 HR (60 MIN)  12:00 PM   (60 min.)    ONC INFUSION NURSE   [facility] 15     16       17     18     19     20     21     22     23       24     25     26     27     28     29     30       31                                               April 2024 Sunday Monday Tuesday Wednesday Thursday Friday Saturday        1     2     3     4    LAB CENTRAL  12:00 PM   (15 min.)   [facility] LAB DRAW   [facility]    RETURN CCSL  12:15 PM   (45 min.)   Kassidy Robles APRN CNP   [facility]    ONC INFUSION 1 HR (60 MIN)   1:30 PM   (60 min.)    ONC INFUSION NURSE   [facility] 5     6       7     8     9     10     11     12     13       14     15     16     17      18    CT CHEST/ABDOMEN/PELVIS W   9:30 AM   (20 min.)   EICCT1   [facility] 19     20       21     22     23     24     25    LAB CENTRAL   9:15 AM   (15 min.)   [facility] LAB DRAW   [facility]    RETURN CCSL   9:25 AM   (30 min.)   Lisandro Aguiar MD   [facility]    ONC INFUSION 1 HR (60 MIN)  11:00 AM   (60 min.)    ONC INFUSION NURSE   [facility] 26     27       28     29     30                                         Lab Results:  Recent Results (from the past 12 hour(s))   Comprehensive metabolic panel    Collection Time: 03/14/24 10:54 AM   Result Value Ref Range    Sodium 143 135 - 145 mmol/L    Potassium 4.2 3.4 - 5.3 mmol/L    Carbon Dioxide (CO2) 27 22 - 29 mmol/L    Anion Gap 10 7 - 15 mmol/L    Urea Nitrogen 15.5 8.0 - 23.0 mg/dL    Creatinine 0.82 0.51 - 0.95 mg/dL    GFR Estimate 77 >60 mL/min/1.73m2    Calcium 9.1 8.8 - 10.2 mg/dL    Chloride 106 98 - 107 mmol/L    Glucose 94 70 - 99 mg/dL    Alkaline Phosphatase 47 40 - 150 U/L    AST 37 0 - 45 U/L    ALT 40 0 - 50 U/L    Protein Total 7.5 6.4 - 8.3 g/dL    Albumin 4.0 3.5 - 5.2 g/dL    Bilirubin Total 0.3 <=1.2 mg/dL   CBC with platelets and differential    Collection Time: 03/14/24 10:54 AM   Result Value Ref Range    WBC Count 7.2 4.0 - 11.0 10e3/uL    RBC Count 4.34 3.80 - 5.20 10e6/uL    Hemoglobin 12.9 11.7 - 15.7 g/dL    Hematocrit 40.4 35.0 - 47.0 %    MCV 93 78 - 100 fL    MCH 29.7 26.5 - 33.0 pg    MCHC 31.9 31.5 - 36.5 g/dL    RDW 13.4 10.0 - 15.0 %    Platelet Count 200 150 - 450 10e3/uL    % Neutrophils 47 %    % Lymphocytes 42 %    % Monocytes 6 %    % Eosinophils 4 %    % Basophils 1 %    % Immature Granulocytes 0 %    NRBCs per 100 WBC 0 <1 /100    Absolute Neutrophils 3.5 1.6 - 8.3 10e3/uL    Absolute Lymphocytes 3.0 0.8 - 5.3 10e3/uL    Absolute Monocytes 0.4 0.0 - 1.3 10e3/uL    Absolute Eosinophils 0.3 0.0  - 0.7 10e3/uL    Absolute Basophils 0.0 0.0 - 0.2 10e3/uL    Absolute Immature Granulocytes 0.0 <=0.4 10e3/uL    Absolute NRBCs 0.0 10e3/uL

## 2024-03-14 NOTE — LETTER
3/14/2024         RE: Hoda Brush  [address]  [place] [address]        Dear Colleague,    Thank you for referring your patient, Hoda Brush, to the [facility]. Please see a copy of my visit note below.    Hoda is a patient from [facility] and is seen here for continuation of care for her metastatic ER+HER2- breast cancer.  She is a refugee from [facility] and was initially seen in clinic with her daughter.  The only data we have from [facility] is an English translation of her records.       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  On 02/11/2014, she was seen by an oncologist.  The clinical staging of T4b N2 M1 was noted.   Her breast cancer was on the left side. There was no right breast cancer, confirmed by the patient and her daughter, correcting a possible error in the translated records.  ER was positive in 100% of the cells, PA at 14% and HER2 was 3+ positive.  It appears that this histopathologic information is on the mastectomy specimen. She underwent an MRI for staging of presumptive bone metastases which was performed 03/02/2014.  There were skeletal metastases in the thoracic vertebrae at 12, L1, L3, L5 and S1 vertebral body, ranging in size from 0.7-3.0 cm in size.  Ischial and right femur were also involved, as well as a large iliac mass measuring about 15 cm in the uterus. 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   Radiation Oncology consultation was performed 03/11/2014, and she was given radiation in 1 dose of 6 Gy to the right iliac lesion.        With the initial diagnosis, she initiated treatment with 6 cycles of CAF neoadjuvant chemotherapy 02/14, 07/07, 03/28, 04/18/14, 05/08 and 05/29/14. She also received monthly zoledronic acid.  She then underwent a modified left mastectomy of Palacios type and left lymphadenoectomy on 06/27/2014.   Pathologic  examination showed number at [facility] was [id number] showed infiltrative grade 2 ductal cancer with 6 level 1 metastatic lympFollow up with Jossie for visits and trastuzumab on 2-5, 2-26, 3-19 with CBC, CMP.  Echocardiogram on 3-19.  Follow up with me 4-9 with CBC, CMP, CA27.29 and CEA and with CT CAP on 4-8.h nodes and 3 level 2 metastatic lymph nodes.  The differentiation was intermediate.  The tumor was ER positive 70% of the cells, MA positive in 40% of the cells and HER2 was 3+ by immunohistochemistry and the Ki-67 labeling index was 20%. Her staging after surgery was stage IV, pT4b N2 M1.  I don't see a biopsy of the skeletal metastases.  She then had continuation with chemotherapy with 4 cycles of Herceptin and taxane with monthly zoledronic acid.  Tamoxifen was initiated.  She then had two years of Herceptin and a decision was made not to continue further Herceptin.  She continued on zoledronic acid every 3 months. She was clinically stable. [place] then [place] to the [place] new refugee from [facility].  She has now been changed to letrozole.  Denosumab has now been held for new diagnosis of osteonecrosis of the R maxilla.     History of cholecystectomy 2020.      TREATMENT HISTORY:  A. Initial diagnosis with metastatic breast cancer in [facility].  Neoadjuvant CAF x 6.   B. Left mastectomy. Left axillary node dissection.  C.  Radiation in 1 dose to R iliac region.  C. Herceptin for 2 years taxane for a prescribed course then stopped, monthly zoledronic acid.  She had 2 years of Herceptin with tamoxifen added after chemotherapy.  D.  Tamoxifen alone and zoledronic acid every 3 months starting 2014.  Letrozole was started   E.  [place]  We restarted Herceptin every 3 weeks and continued tamoxifen. Bone targeted agent changed to denosumab every 9 weeks. Zometa discontinued 2017.  Tamoxifen was changed to letrozole August 2019.    GIANLUCA  Xgeva discontinued 12-17-19 because of dental issues.   G.  J+J vaccine  March, 2021.  H.  Was on Zometa once May 11.  Reaction with eye lid swelling. Discontinued Zometa.  H.  Restart Xgeva 6-22-21.  Continuing the trastuzumab and letrozole.  Both tolerated well.       INTERVAL HISTORY  She denies pain, fatigue, depression or anxiety.  She continues on trastuzumab, letrozole and Xgeva.  No pain, mild fatigue, no depression, no anxiety.  She does have an upper respiratory infection for which she is gradually recovering.  She been sick for 2 weeks.  She also had headache associated with the upper respiratory infection and symptoms of a cough and nasal congestion.  She tested node negative for COVID.     REVIEW OF SYSTEMS:  A 10-point review of systems is entirely negative.     She is a eating a Mediterranean-style diet.  She is exercising by swimming.  I did advise adding a weightbearing exercise.  She takes vitamin D3 2000 International Units per day and calcium.  Her last DEXA scan performed a week ago shows a most valid negative T-score of -2.5 in the left hip, consistent with osteoporosis.     Notably, she has had no bowel or bladder problems.     PHYSICAL EXAMINATION:    VITALS:  /72 (BP Location: Right arm, Patient Position: Sitting, Cuff Size: Adult Large)   Pulse 77   Temp 98.2  F (36.8  C) (Oral)   Resp 18   Wt 80.1 kg (176 lb 8 oz)   SpO2 94%   BMI 32.07 kg/m    GENERAL:  Hoda appeared generally well.  HEENT:  She has no alopecia.  Examination of oropharynx reveals good dentition.  LYMPH:  There is no cervical, supraclavicular, subclavicular or axillary lymphadenopathy.  BREASTS: Breast exam deferred today.  LUNGS:  Clear to percussion and auscultation.  CARDIAC:  Heart has a regular rate and rhythm, S1, S2.  ABDOMEN:  Soft, nontender, without hepatosplenomegaly.  EXTREMITIES:  Without edema.  PSYCH:  Mood and affect were normal.     LABORATORY DATA:  CMP and CBC within normal limits.  CEA, CA27.29 pending.      CT Chest/Abdomen/Pelvis 4/12/23  IMPRESSION:  1.   Stable appearance of sclerotic osseous lesions.  2.  Stable small pulmonary nodules.  3.  No new disease in the chest, abdomen and pelvis.        ASSESSMENT AND PLAN:       1.  Hoda Brush is a 68-year-old woman with a history of ER-positive, ND-positive, HER2 positive breast cancer.  She is from [facility], formally from [facility], and came to live in the [place] with her daughter.  The tumor is ER positive, ND positive and HER2 positive.  She had metastatic disease at the time of presentation with bone-only metastases by report.  She underwent neoadjuvant CAF, had a left mastectomy, left axillary lymph node dissection, radiation to the right hip, which included the right iliac region where she had metastatic disease.  She was initially on tamoxifen but then was switched to letrozole.  She continues on this and every 3-week trastuzumab.  She has had no evidence of disease progression for the last 3 years.  Markers are low and stable.  We have continued Herceptin every 3 weeks as well as daily letrozole. CT CAP shows stable pulmonary nodules.   2.  Hoda Brush continues to do well on a combination of trastuzumab and letrozole. She has no evidence of disease progression.   Her ejection fraction remains stable.   She continues to do well on trastuzumab and letrozole.  She has no evidence of disease progression.  She will continue with every 4-month CT of the chest, abdomen and pelvis.    3.   The right maxillary osteonecrosis has healed and she is able to continue with Xgeva every 3 months. Hold the Xgeva and repeat Dexa scan in 2025.    4.  Continue the letrozole and trastuzuamb.  Letrozole has been well-tolerated, as has trastuzuamb.  No significant joint stiffness.  5.  Echo. Stable EF.  60-65%.  6.  Discussion of bone health and right maxillary osteonecrosis.  All healed.   Xgeva is being continued.   7. Follow up.  Continue Herceptin through the end of 2025 alternating providers every 6 weeks. CT  CAP April 18 and follow up with me April 25.   Echo July 18.  Discontinue Xgeva.  Dexa in 2025.  CBC, CMP every 3 weeks and CA27.29 and CEA every 6 weeks. Echo every 3 months, next on January 23.  Echo every 3 months and CT 4 months       Thank you for allowing us to participate in this patient's care.       Sincerely,      Lisandro Aguiar MD  Professor  [facility]  [phone].           I spent 35 minutes with the patient more than 50% of which was in counseling and coordination of care.

## 2024-03-14 NOTE — NURSING NOTE
"Oncology Rooming Note    March 14, 2024 11:12 AM   Hoda Brush is a 69 year old female who presents for:    Chief Complaint   Patient presents with    Port Draw     Labs drawn via port by RN. VS taken.    Oncology Clinic Visit     Malignant neoplasm metastatic to bone     Initial Vitals: /72 (BP Location: Right arm, Patient Position: Sitting, Cuff Size: Adult Large)   Pulse 77   Temp 98.2  F (36.8  C) (Oral)   Resp 18   Wt 80.1 kg (176 lb 8 oz)   SpO2 94%   BMI 32.07 kg/m   Estimated body mass index is 32.07 kg/m  as calculated from the following:    Height as of 7/11/23: 1.58 m (5' 2.21\").    Weight as of this encounter: 80.1 kg (176 lb 8 oz). Body surface area is 1.87 meters squared.  No Pain (0) Comment: Data Unavailable   No LMP recorded. Patient is postmenopausal.  Allergies reviewed: Yes  Medications reviewed: Yes    Medications: Medication refills not needed today.  Pharmacy name entered into [employer]: [id number] - [place], [place] - [address]    Frailty Screening:   Is the patient here for a new oncology consult visit in cancer care? 2. No      Clinical concerns: None       Tasneem Mckeon CMA            "

## 2024-03-14 NOTE — NURSING NOTE
"Chief Complaint   Patient presents with    Port Draw     Labs drawn via port by RN. VS taken.     Port accessed with 20 gauge, 3/4\" power needle by RN, labs collected, line flushed with saline and heparin.  Vitals taken. Pt checked in for appointment(s).     Carolyn Polanco RN    "

## 2024-03-16 LAB — CANCER AG27-29 SERPL-ACNC: <9 U/ML

## 2024-03-16 RX ORDER — EPINEPHRINE 1 MG/ML
0.3 INJECTION, SOLUTION INTRAMUSCULAR; SUBCUTANEOUS EVERY 5 MIN PRN
Status: CANCELLED | OUTPATIENT
Start: 2024-06-06

## 2024-03-16 RX ORDER — METHYLPREDNISOLONE SODIUM SUCCINATE 125 MG/2ML
125 INJECTION, POWDER, LYOPHILIZED, FOR SOLUTION INTRAMUSCULAR; INTRAVENOUS
Status: CANCELLED
Start: 2024-06-06

## 2024-03-16 RX ORDER — LORAZEPAM 2 MG/ML
0.5 INJECTION INTRAMUSCULAR EVERY 4 HOURS PRN
Status: CANCELLED
Start: 2024-05-16

## 2024-03-16 RX ORDER — DIPHENHYDRAMINE HYDROCHLORIDE 50 MG/ML
50 INJECTION INTRAMUSCULAR; INTRAVENOUS
Status: CANCELLED
Start: 2024-05-16

## 2024-03-16 RX ORDER — DIPHENHYDRAMINE HCL 25 MG
50 CAPSULE ORAL ONCE
Status: CANCELLED
Start: 2024-06-27

## 2024-03-16 RX ORDER — ALBUTEROL SULFATE 0.83 MG/ML
2.5 SOLUTION RESPIRATORY (INHALATION)
Status: CANCELLED | OUTPATIENT
Start: 2024-04-25

## 2024-03-16 RX ORDER — DIPHENHYDRAMINE HCL 25 MG
50 CAPSULE ORAL ONCE
Status: CANCELLED
Start: 2024-04-25

## 2024-03-16 RX ORDER — HEPARIN SODIUM (PORCINE) LOCK FLUSH IV SOLN 100 UNIT/ML 100 UNIT/ML
5 SOLUTION INTRAVENOUS EVERY 8 HOURS
Status: CANCELLED | OUTPATIENT
Start: 2024-06-27

## 2024-03-16 RX ORDER — HEPARIN SODIUM (PORCINE) LOCK FLUSH IV SOLN 100 UNIT/ML 100 UNIT/ML
5 SOLUTION INTRAVENOUS EVERY 8 HOURS
Status: CANCELLED | OUTPATIENT
Start: 2024-06-06

## 2024-03-16 RX ORDER — LORAZEPAM 2 MG/ML
0.5 INJECTION INTRAMUSCULAR EVERY 4 HOURS PRN
Status: CANCELLED
Start: 2024-06-06

## 2024-03-16 RX ORDER — HEPARIN SODIUM (PORCINE) LOCK FLUSH IV SOLN 100 UNIT/ML 100 UNIT/ML
5 SOLUTION INTRAVENOUS EVERY 8 HOURS
Status: CANCELLED | OUTPATIENT
Start: 2024-05-16

## 2024-03-16 RX ORDER — METHYLPREDNISOLONE SODIUM SUCCINATE 125 MG/2ML
125 INJECTION, POWDER, LYOPHILIZED, FOR SOLUTION INTRAMUSCULAR; INTRAVENOUS
Status: CANCELLED
Start: 2024-04-25

## 2024-03-16 RX ORDER — EPINEPHRINE 0.3 MG/.3ML
0.3 INJECTION SUBCUTANEOUS EVERY 5 MIN PRN
Status: CANCELLED | OUTPATIENT
Start: 2024-04-25

## 2024-03-16 RX ORDER — ACETAMINOPHEN 325 MG/1
650 TABLET ORAL
Status: CANCELLED | OUTPATIENT
Start: 2024-06-06

## 2024-03-16 RX ORDER — DIPHENHYDRAMINE HCL 25 MG
50 CAPSULE ORAL ONCE
Status: CANCELLED
Start: 2024-06-06

## 2024-03-16 RX ORDER — EPINEPHRINE 1 MG/ML
0.3 INJECTION, SOLUTION INTRAMUSCULAR; SUBCUTANEOUS EVERY 5 MIN PRN
Status: CANCELLED | OUTPATIENT
Start: 2024-06-27

## 2024-03-16 RX ORDER — ACETAMINOPHEN 325 MG/1
650 TABLET ORAL
Status: CANCELLED | OUTPATIENT
Start: 2024-04-25

## 2024-03-16 RX ORDER — LORAZEPAM 2 MG/ML
0.5 INJECTION INTRAMUSCULAR EVERY 4 HOURS PRN
Status: CANCELLED
Start: 2024-06-27

## 2024-03-16 RX ORDER — ACETAMINOPHEN 325 MG/1
650 TABLET ORAL
Status: CANCELLED | OUTPATIENT
Start: 2024-05-16

## 2024-03-16 RX ORDER — EPINEPHRINE 0.3 MG/.3ML
0.3 INJECTION SUBCUTANEOUS EVERY 5 MIN PRN
Status: CANCELLED | OUTPATIENT
Start: 2024-06-06

## 2024-03-16 RX ORDER — SODIUM CHLORIDE 9 MG/ML
1000 INJECTION, SOLUTION INTRAVENOUS CONTINUOUS PRN
Status: CANCELLED
Start: 2024-06-27

## 2024-03-16 RX ORDER — ACETAMINOPHEN 325 MG/1
650 TABLET ORAL
Status: CANCELLED | OUTPATIENT
Start: 2024-06-27

## 2024-03-16 RX ORDER — DIPHENHYDRAMINE HYDROCHLORIDE 50 MG/ML
50 INJECTION INTRAMUSCULAR; INTRAVENOUS
Status: CANCELLED
Start: 2024-06-27

## 2024-03-16 RX ORDER — SODIUM CHLORIDE 9 MG/ML
1000 INJECTION, SOLUTION INTRAVENOUS CONTINUOUS PRN
Status: CANCELLED
Start: 2024-04-25

## 2024-03-16 RX ORDER — EPINEPHRINE 0.3 MG/.3ML
0.3 INJECTION SUBCUTANEOUS EVERY 5 MIN PRN
Status: CANCELLED | OUTPATIENT
Start: 2024-06-27

## 2024-03-16 RX ORDER — HEPARIN SODIUM (PORCINE) LOCK FLUSH IV SOLN 100 UNIT/ML 100 UNIT/ML
5 SOLUTION INTRAVENOUS EVERY 8 HOURS
Status: CANCELLED | OUTPATIENT
Start: 2024-04-25

## 2024-03-16 RX ORDER — ALBUTEROL SULFATE 90 UG/1
1-2 AEROSOL, METERED RESPIRATORY (INHALATION)
Status: CANCELLED
Start: 2024-04-25

## 2024-03-16 RX ORDER — LORAZEPAM 2 MG/ML
0.5 INJECTION INTRAMUSCULAR EVERY 4 HOURS PRN
Status: CANCELLED
Start: 2024-04-25

## 2024-03-16 RX ORDER — EPINEPHRINE 1 MG/ML
0.3 INJECTION, SOLUTION INTRAMUSCULAR; SUBCUTANEOUS EVERY 5 MIN PRN
Status: CANCELLED | OUTPATIENT
Start: 2024-05-16

## 2024-03-16 RX ORDER — EPINEPHRINE 1 MG/ML
0.3 INJECTION, SOLUTION INTRAMUSCULAR; SUBCUTANEOUS EVERY 5 MIN PRN
Status: CANCELLED | OUTPATIENT
Start: 2024-04-25

## 2024-03-16 RX ORDER — ALBUTEROL SULFATE 0.83 MG/ML
2.5 SOLUTION RESPIRATORY (INHALATION)
Status: CANCELLED | OUTPATIENT
Start: 2024-06-06

## 2024-03-16 RX ORDER — METHYLPREDNISOLONE SODIUM SUCCINATE 125 MG/2ML
125 INJECTION, POWDER, LYOPHILIZED, FOR SOLUTION INTRAMUSCULAR; INTRAVENOUS
Status: CANCELLED
Start: 2024-05-16

## 2024-03-16 RX ORDER — DIPHENHYDRAMINE HYDROCHLORIDE 50 MG/ML
50 INJECTION INTRAMUSCULAR; INTRAVENOUS
Status: CANCELLED
Start: 2024-06-06

## 2024-03-16 RX ORDER — ALBUTEROL SULFATE 90 UG/1
1-2 AEROSOL, METERED RESPIRATORY (INHALATION)
Status: CANCELLED
Start: 2024-06-27

## 2024-03-16 RX ORDER — ALBUTEROL SULFATE 90 UG/1
1-2 AEROSOL, METERED RESPIRATORY (INHALATION)
Status: CANCELLED
Start: 2024-06-06

## 2024-03-16 RX ORDER — METHYLPREDNISOLONE SODIUM SUCCINATE 125 MG/2ML
125 INJECTION, POWDER, LYOPHILIZED, FOR SOLUTION INTRAMUSCULAR; INTRAVENOUS
Status: CANCELLED
Start: 2024-06-27

## 2024-03-16 RX ORDER — EPINEPHRINE 0.3 MG/.3ML
0.3 INJECTION SUBCUTANEOUS EVERY 5 MIN PRN
Status: CANCELLED | OUTPATIENT
Start: 2024-05-16

## 2024-03-16 RX ORDER — SODIUM CHLORIDE 9 MG/ML
1000 INJECTION, SOLUTION INTRAVENOUS CONTINUOUS PRN
Status: CANCELLED
Start: 2024-05-16

## 2024-03-16 RX ORDER — ALBUTEROL SULFATE 0.83 MG/ML
2.5 SOLUTION RESPIRATORY (INHALATION)
Status: CANCELLED | OUTPATIENT
Start: 2024-06-27

## 2024-03-16 RX ORDER — ALBUTEROL SULFATE 0.83 MG/ML
2.5 SOLUTION RESPIRATORY (INHALATION)
Status: CANCELLED | OUTPATIENT
Start: 2024-05-16

## 2024-03-16 RX ORDER — DIPHENHYDRAMINE HYDROCHLORIDE 50 MG/ML
50 INJECTION INTRAMUSCULAR; INTRAVENOUS
Status: CANCELLED
Start: 2024-04-25

## 2024-03-16 RX ORDER — DIPHENHYDRAMINE HCL 25 MG
50 CAPSULE ORAL ONCE
Status: CANCELLED
Start: 2024-05-16

## 2024-03-16 RX ORDER — ALBUTEROL SULFATE 90 UG/1
1-2 AEROSOL, METERED RESPIRATORY (INHALATION)
Status: CANCELLED
Start: 2024-05-16

## 2024-03-16 RX ORDER — SODIUM CHLORIDE 9 MG/ML
1000 INJECTION, SOLUTION INTRAVENOUS CONTINUOUS PRN
Status: CANCELLED
Start: 2024-06-06

## 2024-03-27 ENCOUNTER — OFFICE VISIT (OUTPATIENT)
Dept: PODIATRY | Facility: CLINIC | Age: 69
End: 2024-03-27
Payer: COMMERCIAL

## 2024-03-27 VITALS — WEIGHT: 176 LBS | DIASTOLIC BLOOD PRESSURE: 61 MMHG | BODY MASS INDEX: 31.98 KG/M2 | SYSTOLIC BLOOD PRESSURE: 111 MMHG

## 2024-03-27 DIAGNOSIS — M79.672 LEFT FOOT PAIN: Primary | ICD-10-CM

## 2024-03-27 DIAGNOSIS — L60.0 INGROWN NAIL OF GREAT TOE OF LEFT FOOT: ICD-10-CM

## 2024-03-27 PROCEDURE — 11730 AVULSION NAIL PLATE SIMPLE 1: CPT | Mod: TA | Performed by: PODIATRIST

## 2024-03-27 RX ORDER — CEPHALEXIN 500 MG/1
500 CAPSULE ORAL 2 TIMES DAILY
Qty: 20 CAPSULE | Refills: 0 | Status: SHIPPED | OUTPATIENT
Start: 2024-03-27 | End: 2024-04-06

## 2024-03-27 NOTE — PROGRESS NOTES
Podiatry / Foot and Ankle Surgery Progress Note    March 27, 2024    Subject: Patient was seen for follow up on permanent ingrown nail removal left great toe with Dr. Alfaro.  This was 5 weeks ago.  States that it has been achy.  Pain can be 4 out of 10.  Worse with weightbearing.  Wondering if everything is okay.  Denies specific injury.  Notes that is the other side of the nail that is sore.    Objective:  Vitals:/61   Wt 79.8 kg (176 lb)   BMI 31.98 kg/m      General:  Patient is alert and orientated.  NAD.    Vascular:  DP and PT pulses are palpable.  No edema or varicosities noted.  CFT's < 3secs.  Skin temp is normal.    Neuro:  Light and gross touch sensation intact to digits, dorsum, and plantar aspects of the feet.    Derm: Medial border of the left great toenail is incurvated.  Localized redness and pain on palpation.    Musculoskeletal:  No foot deformity noted.      Assessment:    Left foot pain  Ingrown nail of great toe of left foot    Medical Decision Making/Plan:  The potential causes and nature of an ingrown toenail were discussed with the patient.  We reviewed the natural history/prognosis of the condition and potential risks if no treatment is provided.      Treatment options discussed included conservative management (oral antibiotics, soaking of foot, adequate width shoes)  as well as surgical management (partial or total nail removal).  The pros and cons of both forms of treatment were reviewed.      After thorough discussion and answering all questions, the patient elected to have the other border removed.  She will soak the foot twice a day for 2 weeks and apply Silvadene cream.  We will start her on an oral antibiotic, Keflex.       All questions were answered to patient's satisfaction she will call further questions or concerns.    Procedure: After verbal consent, the left big toe was anesthetized with 5cc's of 1% lidocaine plain. A tourniquet was applied to the toe. The  lateral border was then raised from the nail bed and then cut the length of the nail.  The offending nail border was then removed.  Bacitracin was applied to the nail bed.  The tourniquet was removed.  Bandage was applied to the toe.  The patient tolerated the procedure and anesthesia well.      Patient Risk Factor:  Patient is a low risk factor for infection.     Rachel Whipple DPM, Podiatry/Foot and Ankle Surgery

## 2024-03-27 NOTE — PATIENT INSTRUCTIONS
Thank you for choosing [facility] Podiatry / Foot & Ankle Surgery!    DR MOREL'S CLINIC:  [facility]   [address]   [place] [address]      TRIAGE LINE: [phone]  APPOINTMENTS: [phone]  RADIOLOGY: [phone]  SET UP SURGERY: [phone]  PHYSICAL THERAPY: [phone]   FAX NUMBER: [phone]  BILLING QUESTIONS: [phone]       Follow up: as needed.    INGROWN TOENAILS  When a toenail is ingrown, it is curved and grows into the skin, usually at the nail borders (the sides of the nail). This  digging in  of the nail irritates the skin, often creating pain, redness, swelling, and warmth in the toe.  If an ingrown nail causes a break in the skin, bacteria may enter and cause an infection in the area, which is often marked by drainage and a foul odor. However, even if the toe isn t painful, red, swollen, or warm, a nail that curves downward into the skin can progress to an infection.  CAUSES:  Heredity: In many people, the tendency for ingrown toenails is inherited.   Trauma: Sometimes an ingrown toenail is the result of trauma, such as stubbing your toe, having an object fall on your toe, or engaging in activities that involve repeated pressure on the toes, such as kicking or running.   Improper Trimming:  The most common cause of ingrown toenails is cutting your nails too short. This encourages the skin next to the nail to fold over the nail.   Improperly Sized Footwear: Ingrown toenails can result from wearing socks and shoes that are tight or short.   Nail Conditions: Ingrown toenails can be caused by nail problems, such as fungal infections or losing a nail due to trauma.   TREATMENT: Sometimes initial treatment for ingrown toenails can be safely performed at home. However, home treatment is strongly discouraged if an infection is suspected, or for those who have medical conditions that put feet at high risk, such as diabetes, nerve damage in the foot, or poor  circulation.  Home care: If you don t have an infection or any of the above medical conditions, you can soak your foot in room-temperature water (adding Epsom s salt may be recommended by your doctor), and gently massage the side of the nail fold to help reduce the inflammation.  Avoid attempting  bathroom surgery.  Repeated cutting of the nail can cause the condition to worsen over time. If your symptoms fail to improve, it s time to see a foot and ankle surgeon.  Physician care: After examining the toe, the foot and ankle surgeon will select the treatment best suited for you. If an infection is present, an oral antibiotic may be prescribed.  Sometimes a minor surgical procedure, often performed in the office, will ease the pain and remove the offending nail. After applying a local anesthetic, the doctor removes part of the nail s side border. Some nails may become ingrown again, requiring removal of the nail root.  Following the nail procedure, a light bandage will be applied. Most people experience very little pain after surgery and may resume normal activity the next day. If your surgeon has prescribed an oral antibiotic, be sure to take all the medication, even if your symptoms have improved.  PREVENTION:  Proper Trimming: Cut toenails in a fairly straight line, and don t cut them too short. You should be able to get your fingernail under the sides and end of the nail.   Well-fitting Footwear: Don t wear shoes that are short or tight in the toe area. Avoid shoes that are loose, because they too cause pressure on the toes, especially when running or walking briskly.     INGROWN TOENAIL REMOVAL AFTERCARE   Go directly home and elevate the affected foot on one or two pillows for the remainder of the day/evening if possible. Your toe may stay numb anywhere from 2-8 hours.   Take Tylenol, ibuprofen or another anti-inflammatory as needed for pain.   Take antibiotic if that has been prescribed. Finish the entire  prescribed antibiotic even if your symptoms have improved.   The evening of the procedure, soak/wash the affected area in warm water (you may add Epsom salt) for 5 to 10 minutes. Do this twice a day for 2-4 weeks (6-8 weeks if you had phenol) (you may count showering/bathing as one soak).  After soaks, pat the area dry and then allow to airdry for a few minutes. Apply antibiotic ointment to the area and cover with 2 X 2 gauze and paper tape or band-aid.  You may pursue everyday activities as tolerated with either an open toe shoe or cut-out shoe as needed or you may wear regular shoes if no pain is noted.  Watch for any signs and symptoms of infection such as: redness, red streaks going up the foot/leg, swelling, pus or foul odor. Those that have had the phenol procedure, the toe will drain longer and will look like it is infected because it is a chemical burn.   Please call with questions.

## 2024-03-27 NOTE — LETTER
3/27/2024         RE: Hoda Brush  [address]  [place] [address]        Dear Colleague,    Thank you for referring your patient, Hoda Brush, to the [facility] PODI[facility]. Please see a copy of my visit note below.    Podiatry / Foot and Ankle Surgery Progress Note    March 27, 2024    Subject: Patient was seen for follow up on permanent ingrown nail removal left great toe with Dr. Alfaro.  This was 5 weeks ago.  States that it has been achy.  Pain can be 4 out of 10.  Worse with weightbearing.  Wondering if everything is okay.  Denies specific injury.  Notes that is the other side of the nail that is sore.    Objective:  Vitals:/61   Wt 79.8 kg (176 lb)   BMI 31.98 kg/m      General:  Patient is alert and orientated.  NAD.    Vascular:  DP and PT pulses are palpable.  No edema or varicosities noted.  CFT's < 3secs.  Skin temp is normal.    Neuro:  Light and gross touch sensation intact to digits, dorsum, and plantar aspects of the feet.    Derm: Medial border of the left great toenail is incurvated.  Localized redness and pain on palpation.    Musculoskeletal:  No foot deformity noted.      Assessment:    Left foot pain  Ingrown nail of great toe of left foot    Medical Decision Making/Plan:  The potential causes and nature of an ingrown toenail were discussed with the patient.  We reviewed the natural history/prognosis of the condition and potential risks if no treatment is provided.      Treatment options discussed included conservative management (oral antibiotics, soaking of foot, adequate width shoes)  as well as surgical management (partial or total nail removal).  The pros and cons of both forms of treatment were reviewed.      After thorough discussion and answering all questions, the patient elected to have the other border removed.  She will soak the foot twice a day for 2 weeks and apply Silvadene cream.  We will start her on an oral  antibiotic, Keflex.       All questions were answered to patient's satisfaction she will call further questions or concerns.    Procedure: After verbal consent, the left big toe was anesthetized with 5cc's of 1% lidocaine plain. A tourniquet was applied to the toe. The lateral border was then raised from the nail bed and then cut the length of the nail.  The offending nail border was then removed.  Bacitracin was applied to the nail bed.  The tourniquet was removed.  Bandage was applied to the toe.  The patient tolerated the procedure and anesthesia well.      Patient Risk Factor:  Patient is a low risk factor for infection.     Rachel Whipple DPM, Podiatry/Foot and Ankle Surgery      Again, thank you for allowing me to participate in the care of your patient.        Sincerely,        Rachel Whipple DPM, Podiatry/Foot and Ankle Surgery

## 2024-04-03 ENCOUNTER — PATIENT OUTREACH (OUTPATIENT)
Dept: GERIATRIC MEDICINE | Facility: CLINIC | Age: 69
End: 2024-04-03
Payer: COMMERCIAL

## 2024-04-03 NOTE — PROGRESS NOTES
[facility] Care Coordination Contact    Care coordinator made 4 attempts to reach member. She was UTR. On 4th attempt called member to complete six month assessment and left a message requesting a return call.    Lakesha Ventura RN  [facility]  [phone]

## 2024-04-03 NOTE — LETTER
April 3, 2024    NATASHA LOUIS  [address]   [place] [address]    Dear Natasha:     I m your care coordinator. I ve been unable to reach you by phone. I am writing to ask you or your authorized representative to call me at [phone]. If you reach my voicemail, leave a message with your daytime phone number. Include a date and time that I can call you. If you are hearing impaired, call the [place] Relay at 519 or [phone] ([id number] relay service).    The reason I am trying to reach you is:     [] To schedule an assessment  [x] For your six (6)-month check-in  [] Other:      Please call me as soon as you receive this letter. I look forward to speaking with you.    Sincerely,    Lakesha Ventura RN, BSN, PHN  [phone]  [email or web address]            [id number] accepted  [id number]                                                                        B  (08/2022)

## 2024-04-03 NOTE — PROGRESS NOTES
"[facility] Care Coordination Contact    Per CC, mailed client an \"Unable to Contact\" letter.      Chey Hanley  Case Management Specialist  [facility]  [phone]    "

## 2024-04-04 ENCOUNTER — APPOINTMENT (OUTPATIENT)
Dept: LAB | Facility: CLINIC | Age: 69
End: 2024-04-04
Attending: INTERNAL MEDICINE
Payer: COMMERCIAL

## 2024-04-04 ENCOUNTER — INFUSION THERAPY VISIT (OUTPATIENT)
Dept: ONCOLOGY | Facility: CLINIC | Age: 69
End: 2024-04-04
Attending: NURSE PRACTITIONER
Payer: COMMERCIAL

## 2024-04-04 VITALS
BODY MASS INDEX: 32.22 KG/M2 | DIASTOLIC BLOOD PRESSURE: 74 MMHG | TEMPERATURE: 97.7 F | SYSTOLIC BLOOD PRESSURE: 118 MMHG | RESPIRATION RATE: 16 BRPM | OXYGEN SATURATION: 100 % | HEART RATE: 67 BPM | WEIGHT: 177.3 LBS

## 2024-04-04 DIAGNOSIS — C50.912 MALIGNANT NEOPLASM OF LEFT FEMALE BREAST, UNSPECIFIED ESTROGEN RECEPTOR STATUS, UNSPECIFIED SITE OF BREAST (H): Primary | ICD-10-CM

## 2024-04-04 LAB
ALBUMIN SERPL BCG-MCNC: 4.1 G/DL (ref 3.5–5.2)
ALP SERPL-CCNC: 46 U/L (ref 40–150)
ALT SERPL W P-5'-P-CCNC: 43 U/L (ref 0–50)
ANION GAP SERPL CALCULATED.3IONS-SCNC: 11 MMOL/L (ref 7–15)
AST SERPL W P-5'-P-CCNC: 41 U/L (ref 0–45)
BASOPHILS # BLD AUTO: 0 10E3/UL (ref 0–0.2)
BASOPHILS NFR BLD AUTO: 1 %
BILIRUB SERPL-MCNC: 0.3 MG/DL
BUN SERPL-MCNC: 12 MG/DL (ref 8–23)
CALCIUM SERPL-MCNC: 9.1 MG/DL (ref 8.8–10.2)
CEA SERPL-MCNC: 1 NG/ML
CHLORIDE SERPL-SCNC: 107 MMOL/L (ref 98–107)
CREAT SERPL-MCNC: 0.84 MG/DL (ref 0.51–0.95)
DEPRECATED HCO3 PLAS-SCNC: 26 MMOL/L (ref 22–29)
EGFRCR SERPLBLD CKD-EPI 2021: 75 ML/MIN/1.73M2
EOSINOPHIL # BLD AUTO: 0.4 10E3/UL (ref 0–0.7)
EOSINOPHIL NFR BLD AUTO: 5 %
ERYTHROCYTE [DISTWIDTH] IN BLOOD BY AUTOMATED COUNT: 13.5 % (ref 10–15)
GLUCOSE SERPL-MCNC: 80 MG/DL (ref 70–99)
HCT VFR BLD AUTO: 38.6 % (ref 35–47)
HGB BLD-MCNC: 12.1 G/DL (ref 11.7–15.7)
IMM GRANULOCYTES # BLD: 0 10E3/UL
IMM GRANULOCYTES NFR BLD: 0 %
LYMPHOCYTES # BLD AUTO: 3.2 10E3/UL (ref 0.8–5.3)
LYMPHOCYTES NFR BLD AUTO: 42 %
MCH RBC QN AUTO: 29.2 PG (ref 26.5–33)
MCHC RBC AUTO-ENTMCNC: 31.3 G/DL (ref 31.5–36.5)
MCV RBC AUTO: 93 FL (ref 78–100)
MONOCYTES # BLD AUTO: 0.5 10E3/UL (ref 0–1.3)
MONOCYTES NFR BLD AUTO: 6 %
NEUTROPHILS # BLD AUTO: 3.5 10E3/UL (ref 1.6–8.3)
NEUTROPHILS NFR BLD AUTO: 46 %
NRBC # BLD AUTO: 0 10E3/UL
NRBC BLD AUTO-RTO: 0 /100
PLATELET # BLD AUTO: 199 10E3/UL (ref 150–450)
POTASSIUM SERPL-SCNC: 4.3 MMOL/L (ref 3.4–5.3)
PROT SERPL-MCNC: 7.7 G/DL (ref 6.4–8.3)
RBC # BLD AUTO: 4.14 10E6/UL (ref 3.8–5.2)
SODIUM SERPL-SCNC: 144 MMOL/L (ref 135–145)
WBC # BLD AUTO: 7.6 10E3/UL (ref 4–11)

## 2024-04-04 PROCEDURE — 82378 CARCINOEMBRYONIC ANTIGEN: CPT | Performed by: INTERNAL MEDICINE

## 2024-04-04 PROCEDURE — 86300 IMMUNOASSAY TUMOR CA 15-3: CPT | Performed by: INTERNAL MEDICINE

## 2024-04-04 PROCEDURE — 258N000003 HC RX IP 258 OP 636: Performed by: INTERNAL MEDICINE

## 2024-04-04 PROCEDURE — 80053 COMPREHEN METABOLIC PANEL: CPT | Performed by: INTERNAL MEDICINE

## 2024-04-04 PROCEDURE — 85025 COMPLETE CBC W/AUTO DIFF WBC: CPT | Performed by: INTERNAL MEDICINE

## 2024-04-04 PROCEDURE — 250N000011 HC RX IP 250 OP 636: Performed by: INTERNAL MEDICINE

## 2024-04-04 PROCEDURE — 36591 DRAW BLOOD OFF VENOUS DEVICE: CPT | Performed by: INTERNAL MEDICINE

## 2024-04-04 PROCEDURE — 96413 CHEMO IV INFUSION 1 HR: CPT

## 2024-04-04 PROCEDURE — 250N000011 HC RX IP 250 OP 636: Performed by: NURSE PRACTITIONER

## 2024-04-04 RX ORDER — HEPARIN SODIUM (PORCINE) LOCK FLUSH IV SOLN 100 UNIT/ML 100 UNIT/ML
5 SOLUTION INTRAVENOUS EVERY 8 HOURS
Status: DISCONTINUED | OUTPATIENT
Start: 2024-04-04 | End: 2024-04-04 | Stop reason: HOSPADM

## 2024-04-04 RX ORDER — HEPARIN SODIUM (PORCINE) LOCK FLUSH IV SOLN 100 UNIT/ML 100 UNIT/ML
5 SOLUTION INTRAVENOUS ONCE
Status: COMPLETED | OUTPATIENT
Start: 2024-04-04 | End: 2024-04-04

## 2024-04-04 RX ADMIN — TRASTUZUMAB 450 MG: 150 INJECTION, POWDER, LYOPHILIZED, FOR SOLUTION INTRAVENOUS at 14:11

## 2024-04-04 RX ADMIN — SODIUM CHLORIDE 250 ML: 9 INJECTION, SOLUTION INTRAVENOUS at 14:15

## 2024-04-04 RX ADMIN — Medication 5 ML: at 12:46

## 2024-04-04 RX ADMIN — Medication 5 ML: at 14:54

## 2024-04-04 ASSESSMENT — PAIN SCALES - GENERAL: PAINLEVEL: NO PAIN (0)

## 2024-04-04 NOTE — PROGRESS NOTES
Infusion Nursing Note:  Hoda Brush presents today for Cycle 112 Herceptin.    Patient seen by provider today: No    Note: Patient presents to clinic today feeling well with no questions.  Pt did not request or require any intervention for pain today.    Intravenous Access:  Implanted Port.    Treatment Conditions:  Lab Results   Component Value Date    HGB 12.1 04/04/2024    WBC 7.6 04/04/2024    ANEU 3.1 06/22/2021    ANEUTAUTO 3.5 04/04/2024     04/04/2024     ECHO/MUGA completed 1/23/24 EF 60-65%    Post Infusion Assessment:  Patient tolerated infusion without incident.  Report given to Estela COLLAZO RN, at 1400 to administer Herceptin and discharge.  Blood return noted pre and post infusion.  Site patent and intact, free from redness, edema or discomfort.  No evidence of extravasations.  Access discontinued per protocol.    Discharge Plan:   Patient declined prescription refills.  Discharge instructions reviewed with: Patient.  Patient and/or family verbalized understanding of discharge instructions and all questions answered.  AVS to patient via [employer].  Patient will return 4/25/2024 for next appointment.   Patient discharged in stable condition accompanied by: self.  Departure Mode: Ambulatory.    Steffi Oh RN

## 2024-04-04 NOTE — PATIENT INSTRUCTIONS
[facility] Triage and after hours / weekends / holidays:  [phone] option 5, option 2    Please call the triage or after hours line if you experience a temperature greater than or equal to 100.4, shaking chills, have uncontrolled nausea, vomiting and/or diarrhea, dizziness, shortness of breath, chest pain, bleeding, unexplained bruising, or if you have any other new/concerning symptoms, questions or concerns.      If you are having any concerning symptoms or wish to speak to a provider before your next infusion visit, please call triage to notify your care team so we can adequately serve you.     If you need a refill on a narcotic prescription or other medication, please call before your infusion appointment.

## 2024-04-05 LAB — CANCER AG27-29 SERPL-ACNC: 22.3 U/ML

## 2024-04-18 ENCOUNTER — ANCILLARY PROCEDURE (OUTPATIENT)
Dept: CT IMAGING | Facility: CLINIC | Age: 69
End: 2024-04-18
Attending: INTERNAL MEDICINE
Payer: COMMERCIAL

## 2024-04-18 DIAGNOSIS — C79.51 MALIGNANT NEOPLASM METASTATIC TO BONE (H): ICD-10-CM

## 2024-04-18 PROCEDURE — 250N000011 HC RX IP 250 OP 636: Performed by: INTERNAL MEDICINE

## 2024-04-18 PROCEDURE — 250N000009 HC RX 250: Performed by: INTERNAL MEDICINE

## 2024-04-18 PROCEDURE — 71260 CT THORAX DX C+: CPT

## 2024-04-18 RX ORDER — IOPAMIDOL 755 MG/ML
100 INJECTION, SOLUTION INTRAVASCULAR ONCE
Status: COMPLETED | OUTPATIENT
Start: 2024-04-18 | End: 2024-04-18

## 2024-04-18 RX ADMIN — SODIUM CHLORIDE 40 ML: 9 INJECTION, SOLUTION INTRAVENOUS at 09:32

## 2024-04-18 RX ADMIN — IOPAMIDOL 100 ML: 755 INJECTION, SOLUTION INTRAVENOUS at 09:32

## 2024-04-20 NOTE — PROGRESS NOTES
ONCOLOGY NOTE    Hoda Brush  Female, 69 year old, 1955  MRN: [id number]      Hoda is a 69 year old patient from [facility] and is seen here for continuation of care for her metastatic ER+HER2- breast cancer.  She is a refugee from [facility] and was initially seen in clinic with her daughter.  The only data we have from [facility] is an English translation of her records.       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  On 02/11/2014, she was seen by an oncologist.  The clinical staging of T4b N2 M1 was noted.   Her breast cancer was on the left side. There was no right breast cancer, confirmed by the patient and her daughter, correcting a possible error in the translated records.  ER was positive in 100% of the cells, AK at 14% and HER2 was 3+ positive.  It appears that this histopathologic information is on the mastectomy specimen. She underwent an MRI for staging of presumptive bone metastases which was performed 03/02/2014.  There were skeletal metastases in the thoracic vertebrae at 12, L1, L3, L5 and S1 vertebral body, ranging in size from 0.7-3.0 cm in size.  Ischial and right femur were also involved, as well as a large iliac mass measuring about 15 cm in the uterus. 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   Radiation Oncology consultation was performed 03/11/2014, and she was given radiation in 1 dose of 6 Gy to the right iliac lesion.        With the initial diagnosis, she initiated treatment with 6 cycles of CAF neoadjuvant chemotherapy 02/14, 07/07, 03/28, 04/18/14, 05/08 and 05/29/14. She also received monthly zoledronic acid.  She then underwent a modified left mastectomy of Palacios type and left lymphadenoectomy on 06/27/2014.   Pathologic examination showed number at [facility] was [id number] showed infiltrative grade 2 ductal cancer with 6 level 1 metastatic lympFollow up with Jossie for visits and  trastuzumab on 2-5, 2-26, 3-19 with CBC, CMP.  Echocardiogram on 3-19.  Follow up with me 4-9 with CBC, CMP, CA27.29 and CEA and with CT CAP on 4-8.h nodes and 3 level 2 metastatic lymph nodes.  The differentiation was intermediate.  The tumor was ER positive 70% of the cells, MO positive in 40% of the cells and HER2 was 3+ by immunohistochemistry and the Ki-67 labeling index was 20%. Her staging after surgery was stage IV, pT4b N2 M1.  I don't see a biopsy of the skeletal metastases.  She then had continuation with chemotherapy with 4 cycles of Herceptin and taxane with monthly zoledronic acid.  Tamoxifen was initiated.  She then had two years of Herceptin and a decision was made not to continue further Herceptin.  She continued on zoledronic acid every 3 months. She was clinically stable. [place] then [place] the [place] new [place] from [place].  She has now been changed to letrozole.  Denosumab has now been held for new diagnosis of osteonecrosis of the R maxilla.     History of cholecystectomy 2020.      TREATMENT HISTORY:  A. Initial diagnosis with metastatic breast cancer in [facility].  Neoadjuvant CAF x 6.   B. Left mastectomy. Left axillary node dissection.  C.  Radiation in 1 dose to R iliac region.  C. Herceptin for 2 years taxane for a prescribed course then stopped, monthly zoledronic acid.  She had 2 years of Herceptin with tamoxifen added after chemotherapy.  D.  Tamoxifen alone and zoledronic acid every 3 months starting 2014.  Letrozole was started   E.  [place]  We restarted Herceptin every 3 weeks and continued tamoxifen. Bone targeted agent changed to denosumab every 9 weeks. Zometa discontinued 2017.  Tamoxifen was changed to letrozole August 2019.    F.  Xgeva discontinued 12-17-19 because of dental issues.   G.  J+J vaccine March, 2021.  H.  Was on Zometa once May 11.  Reaction with eye lid swelling. Discontinued Zometa.  H.  Restart Xgeva 6-22-21.  Continuing the trastuzumab and  letrozole.  Both tolerated well.   I.  Discontinue Xgeva because of osteonecrosis of the maxilla.   Continue trastuzumab and letrozole.       INTERVAL HISTORY  She denies pain, fatigue, depression or anxiety.  She continues on trastuzumab, letrozole.  We have discontinued Xgeva because of osteonecrosis maxilla.  No pain, mild fatigue, no depression, no anxiety.  She has been feeling generally well.     REVIEW OF SYSTEMS:  A 10-point review of systems is entirely negative.     She is a eating a Mediterranean-style diet.  She is exercising by swimming.  I did advise adding a weightbearing exercise.  She takes vitamin D3 2000 International Units per day and calcium.  Her last DEXA scan performed a week ago shows a most valid negative T-score of -2.5 in the left hip, consistent with osteoporosis.     Notably, she has had no bowel or bladder problems.     PHYSICAL EXAMINATION:    VITALS:  /78   Pulse 79   Temp 98  F (36.7  C)   Resp 16   Wt 80.3 kg (177 lb)   SpO2 98%   BMI 32.16 kg/m    GENERAL:  Hoda appeared generally well.  HEENT:  She has no alopecia.  Examination of oropharynx reveals good dentition.  LYMPH:  There is no cervical, supraclavicular, subclavicular or axillary lymphadenopathy.  BREASTS: Breast exam deferred today.  LUNGS:  Clear to percussion and auscultation.  CARDIAC:  Heart has a regular rate and rhythm, S1, S2.  ABDOMEN:  Soft, nontender, without hepatosplenomegaly.  EXTREMITIES:  Without edema.  PSYCH:  Mood and affect were normal.     LABORATORY DATA:  CMP and CBC within normal limits.  CEA, CA27.29 pending.      CT Chest/Abdomen/Pelvis 4/12/23  IMPRESSION:  1.  Stable appearance of sclerotic osseous lesions.  2.  Stable small pulmonary nodules.  3.  No new disease in the chest, abdomen and pelvis.        ASSESSMENT AND PLAN:       1.  Hoda Brush is a 69-year-old woman with a history of ER-positive, [place]-positive, HER2 positive breast cancer.  She is from [facility] and  came to live in the [place] with [place].  The tumor is ER positive, DE positive and HER2 positive.  She had metastatic disease at the time of presentation with bone-only metastases by report.  She underwent neoadjuvant CAF, had a left mastectomy, left axillary lymph node dissection, radiation to the right hip, which included the right iliac region where she had metastatic disease.  She was initially on tamoxifen but then was switched to letrozole.  She continues on this and every 3-week trastuzumab.  She has had no evidence of disease progression for the last 7 years and possible longer but we don't have detailed data from [facility].  Markers are low and stable.  We have continued Herceptin every 3 weeks as well as daily letrozole. CT CAP shows stable pulmonary nodules.   2.  Hoda Brush continues to do well on a combination of trastuzumab and letrozole. She has no evidence of disease progression.   Her ejection fraction remains stable.   She continues to do well on trastuzumab and letrozole.  She has no evidence of disease progression.  She will continue with every 4-month CT of the chest, abdomen and pelvis.    3.   The right maxillary osteonecrosis has healed and she is able to continue with Xgeva every 3 months. Hold the Xgeva and repeat Dexa scan in 2025.    4.  Continue the letrozole and trastuzuamb.  Letrozole has been well-tolerated, as has trastuzuamb.  No significant joint stiffness.  5.  Echo. Stable EF.  60-65%.  6.  Discussion of bone health and right maxillary osteonecrosis.  All healed.   Xgeva is being continued.   7. Follow up.  Continue Herceptin through the end of 2025 alternating providers every 6 weeks. CT CAP every 4 months, next 8-28 follow up with me 8-29 with trastuzumab.   Echo July 18.  Discontinue Xgeva.  Dexa in 2025.  CBC, CMP every 3 weeks and CA27.29 and CEA every 6 weeks. Echo every 4 months, next on January 23.  CT 4 months       Thank you for allowing us to  participate in this patient's care.       Sincerely,      Lisandro Aguiar MD  Professor  [facility]  [phone]           I spent 35 minutes with the patient more than 50% of which was in counseling and coordination of care.

## 2024-04-25 ENCOUNTER — APPOINTMENT (OUTPATIENT)
Dept: LAB | Facility: CLINIC | Age: 69
End: 2024-04-25
Attending: INTERNAL MEDICINE
Payer: COMMERCIAL

## 2024-04-25 ENCOUNTER — ONCOLOGY VISIT (OUTPATIENT)
Dept: ONCOLOGY | Facility: CLINIC | Age: 69
End: 2024-04-25
Attending: INTERNAL MEDICINE
Payer: COMMERCIAL

## 2024-04-25 VITALS
HEART RATE: 79 BPM | BODY MASS INDEX: 32.16 KG/M2 | DIASTOLIC BLOOD PRESSURE: 78 MMHG | OXYGEN SATURATION: 98 % | RESPIRATION RATE: 16 BRPM | SYSTOLIC BLOOD PRESSURE: 115 MMHG | WEIGHT: 177 LBS | TEMPERATURE: 98 F

## 2024-04-25 DIAGNOSIS — C79.51 MALIGNANT NEOPLASM METASTATIC TO BONE (H): ICD-10-CM

## 2024-04-25 DIAGNOSIS — Z78.0 ASYMPTOMATIC POSTMENOPAUSAL STATUS: ICD-10-CM

## 2024-04-25 DIAGNOSIS — C50.912 MALIGNANT NEOPLASM OF LEFT FEMALE BREAST, UNSPECIFIED ESTROGEN RECEPTOR STATUS, UNSPECIFIED SITE OF BREAST (H): Primary | ICD-10-CM

## 2024-04-25 LAB
ALBUMIN SERPL BCG-MCNC: 4.1 G/DL (ref 3.5–5.2)
ALP SERPL-CCNC: 49 U/L (ref 40–150)
ALT SERPL W P-5'-P-CCNC: 42 U/L (ref 0–50)
ANION GAP SERPL CALCULATED.3IONS-SCNC: 10 MMOL/L (ref 7–15)
AST SERPL W P-5'-P-CCNC: 39 U/L (ref 0–45)
BASOPHILS # BLD AUTO: 0 10E3/UL (ref 0–0.2)
BASOPHILS NFR BLD AUTO: 0 %
BILIRUB SERPL-MCNC: 0.3 MG/DL
BUN SERPL-MCNC: 14.1 MG/DL (ref 8–23)
CALCIUM SERPL-MCNC: 9.6 MG/DL (ref 8.8–10.2)
CEA SERPL-MCNC: 1 NG/ML
CHLORIDE SERPL-SCNC: 105 MMOL/L (ref 98–107)
CREAT SERPL-MCNC: 0.79 MG/DL (ref 0.51–0.95)
DEPRECATED HCO3 PLAS-SCNC: 27 MMOL/L (ref 22–29)
EGFRCR SERPLBLD CKD-EPI 2021: 81 ML/MIN/1.73M2
EOSINOPHIL # BLD AUTO: 0.4 10E3/UL (ref 0–0.7)
EOSINOPHIL NFR BLD AUTO: 5 %
ERYTHROCYTE [DISTWIDTH] IN BLOOD BY AUTOMATED COUNT: 13.5 % (ref 10–15)
GLUCOSE SERPL-MCNC: 97 MG/DL (ref 70–99)
HCT VFR BLD AUTO: 39.3 % (ref 35–47)
HGB BLD-MCNC: 12.6 G/DL (ref 11.7–15.7)
IMM GRANULOCYTES # BLD: 0 10E3/UL
IMM GRANULOCYTES NFR BLD: 0 %
LYMPHOCYTES # BLD AUTO: 2.7 10E3/UL (ref 0.8–5.3)
LYMPHOCYTES NFR BLD AUTO: 36 %
MCH RBC QN AUTO: 29.8 PG (ref 26.5–33)
MCHC RBC AUTO-ENTMCNC: 32.1 G/DL (ref 31.5–36.5)
MCV RBC AUTO: 93 FL (ref 78–100)
MONOCYTES # BLD AUTO: 0.5 10E3/UL (ref 0–1.3)
MONOCYTES NFR BLD AUTO: 7 %
NEUTROPHILS # BLD AUTO: 3.8 10E3/UL (ref 1.6–8.3)
NEUTROPHILS NFR BLD AUTO: 52 %
NRBC # BLD AUTO: 0 10E3/UL
NRBC BLD AUTO-RTO: 0 /100
PLATELET # BLD AUTO: 186 10E3/UL (ref 150–450)
POTASSIUM SERPL-SCNC: 4.2 MMOL/L (ref 3.4–5.3)
PROT SERPL-MCNC: 7.6 G/DL (ref 6.4–8.3)
RBC # BLD AUTO: 4.23 10E6/UL (ref 3.8–5.2)
SODIUM SERPL-SCNC: 142 MMOL/L (ref 135–145)
WBC # BLD AUTO: 7.4 10E3/UL (ref 4–11)

## 2024-04-25 PROCEDURE — 82378 CARCINOEMBRYONIC ANTIGEN: CPT | Performed by: INTERNAL MEDICINE

## 2024-04-25 PROCEDURE — 99214 OFFICE O/P EST MOD 30 MIN: CPT | Performed by: INTERNAL MEDICINE

## 2024-04-25 PROCEDURE — 250N000011 HC RX IP 250 OP 636: Performed by: INTERNAL MEDICINE

## 2024-04-25 PROCEDURE — 96413 CHEMO IV INFUSION 1 HR: CPT

## 2024-04-25 PROCEDURE — 36591 DRAW BLOOD OFF VENOUS DEVICE: CPT | Performed by: INTERNAL MEDICINE

## 2024-04-25 PROCEDURE — 82040 ASSAY OF SERUM ALBUMIN: CPT | Performed by: INTERNAL MEDICINE

## 2024-04-25 PROCEDURE — 250N000011 HC RX IP 250 OP 636: Mod: JZ | Performed by: INTERNAL MEDICINE

## 2024-04-25 PROCEDURE — 258N000003 HC RX IP 258 OP 636: Performed by: INTERNAL MEDICINE

## 2024-04-25 PROCEDURE — 85041 AUTOMATED RBC COUNT: CPT | Performed by: INTERNAL MEDICINE

## 2024-04-25 PROCEDURE — G0463 HOSPITAL OUTPT CLINIC VISIT: HCPCS | Performed by: INTERNAL MEDICINE

## 2024-04-25 PROCEDURE — 86300 IMMUNOASSAY TUMOR CA 15-3: CPT | Performed by: INTERNAL MEDICINE

## 2024-04-25 RX ORDER — HEPARIN SODIUM (PORCINE) LOCK FLUSH IV SOLN 100 UNIT/ML 100 UNIT/ML
5 SOLUTION INTRAVENOUS EVERY 8 HOURS
Status: CANCELLED | OUTPATIENT
Start: 2024-08-08

## 2024-04-25 RX ORDER — ACETAMINOPHEN 325 MG/1
650 TABLET ORAL
Status: CANCELLED | OUTPATIENT
Start: 2024-07-18

## 2024-04-25 RX ORDER — METHYLPREDNISOLONE SODIUM SUCCINATE 125 MG/2ML
125 INJECTION, POWDER, LYOPHILIZED, FOR SOLUTION INTRAMUSCULAR; INTRAVENOUS
Status: CANCELLED
Start: 2024-08-08

## 2024-04-25 RX ORDER — HEPARIN SODIUM (PORCINE) LOCK FLUSH IV SOLN 100 UNIT/ML 100 UNIT/ML
5 SOLUTION INTRAVENOUS EVERY 8 HOURS
Status: DISCONTINUED | OUTPATIENT
Start: 2024-04-25 | End: 2024-04-25 | Stop reason: HOSPADM

## 2024-04-25 RX ORDER — HEPARIN SODIUM (PORCINE) LOCK FLUSH IV SOLN 100 UNIT/ML 100 UNIT/ML
5 SOLUTION INTRAVENOUS EVERY 8 HOURS
Status: CANCELLED | OUTPATIENT
Start: 2024-07-18

## 2024-04-25 RX ORDER — EPINEPHRINE 0.3 MG/.3ML
0.3 INJECTION SUBCUTANEOUS EVERY 5 MIN PRN
Status: CANCELLED | OUTPATIENT
Start: 2024-08-08

## 2024-04-25 RX ORDER — DIPHENHYDRAMINE HYDROCHLORIDE 50 MG/ML
50 INJECTION INTRAMUSCULAR; INTRAVENOUS
Status: CANCELLED
Start: 2024-07-18

## 2024-04-25 RX ORDER — SODIUM CHLORIDE 9 MG/ML
1000 INJECTION, SOLUTION INTRAVENOUS CONTINUOUS PRN
Status: CANCELLED
Start: 2024-07-18

## 2024-04-25 RX ORDER — EPINEPHRINE 1 MG/ML
0.3 INJECTION, SOLUTION INTRAMUSCULAR; SUBCUTANEOUS EVERY 5 MIN PRN
Status: CANCELLED | OUTPATIENT
Start: 2024-07-18

## 2024-04-25 RX ORDER — EPINEPHRINE 0.3 MG/.3ML
0.3 INJECTION SUBCUTANEOUS EVERY 5 MIN PRN
Status: CANCELLED | OUTPATIENT
Start: 2024-07-18

## 2024-04-25 RX ORDER — ALBUTEROL SULFATE 90 UG/1
1-2 AEROSOL, METERED RESPIRATORY (INHALATION)
Status: CANCELLED
Start: 2024-07-18

## 2024-04-25 RX ORDER — DIPHENHYDRAMINE HCL 25 MG
50 CAPSULE ORAL ONCE
Status: CANCELLED
Start: 2024-07-18

## 2024-04-25 RX ORDER — EPINEPHRINE 1 MG/ML
0.3 INJECTION, SOLUTION INTRAMUSCULAR; SUBCUTANEOUS EVERY 5 MIN PRN
Status: CANCELLED | OUTPATIENT
Start: 2024-08-08

## 2024-04-25 RX ORDER — ALBUTEROL SULFATE 0.83 MG/ML
2.5 SOLUTION RESPIRATORY (INHALATION)
Status: CANCELLED | OUTPATIENT
Start: 2024-08-08

## 2024-04-25 RX ORDER — SODIUM CHLORIDE 9 MG/ML
1000 INJECTION, SOLUTION INTRAVENOUS CONTINUOUS PRN
Status: CANCELLED
Start: 2024-08-08

## 2024-04-25 RX ORDER — ALBUTEROL SULFATE 90 UG/1
1-2 AEROSOL, METERED RESPIRATORY (INHALATION)
Status: CANCELLED
Start: 2024-08-08

## 2024-04-25 RX ORDER — ALBUTEROL SULFATE 0.83 MG/ML
2.5 SOLUTION RESPIRATORY (INHALATION)
Status: CANCELLED | OUTPATIENT
Start: 2024-07-18

## 2024-04-25 RX ORDER — DIPHENHYDRAMINE HYDROCHLORIDE 50 MG/ML
50 INJECTION INTRAMUSCULAR; INTRAVENOUS
Status: CANCELLED
Start: 2024-08-08

## 2024-04-25 RX ORDER — LORAZEPAM 2 MG/ML
0.5 INJECTION INTRAMUSCULAR EVERY 4 HOURS PRN
Status: CANCELLED
Start: 2024-07-18

## 2024-04-25 RX ORDER — METHYLPREDNISOLONE SODIUM SUCCINATE 125 MG/2ML
125 INJECTION, POWDER, LYOPHILIZED, FOR SOLUTION INTRAMUSCULAR; INTRAVENOUS
Status: CANCELLED
Start: 2024-07-18

## 2024-04-25 RX ORDER — DIPHENHYDRAMINE HCL 25 MG
50 CAPSULE ORAL ONCE
Status: CANCELLED
Start: 2024-08-08

## 2024-04-25 RX ORDER — HEPARIN SODIUM (PORCINE) LOCK FLUSH IV SOLN 100 UNIT/ML 100 UNIT/ML
5 SOLUTION INTRAVENOUS ONCE
Status: COMPLETED | OUTPATIENT
Start: 2024-04-25 | End: 2024-04-25

## 2024-04-25 RX ORDER — LORAZEPAM 2 MG/ML
0.5 INJECTION INTRAMUSCULAR EVERY 4 HOURS PRN
Status: CANCELLED
Start: 2024-08-08

## 2024-04-25 RX ORDER — ACETAMINOPHEN 325 MG/1
650 TABLET ORAL
Status: CANCELLED | OUTPATIENT
Start: 2024-08-08

## 2024-04-25 RX ADMIN — TRASTUZUMAB 450 MG: 150 INJECTION, POWDER, LYOPHILIZED, FOR SOLUTION INTRAVENOUS at 11:05

## 2024-04-25 RX ADMIN — Medication 5 ML: at 09:38

## 2024-04-25 RX ADMIN — Medication 5 ML: at 11:35

## 2024-04-25 ASSESSMENT — PAIN SCALES - GENERAL: PAINLEVEL: NO PAIN (0)

## 2024-04-25 NOTE — PROGRESS NOTES
Infusion Nursing Note:  Hoda Brush presents today for Cycle 113 Day 1 trastuzumab.    Patient seen by provider today: Yes: Dr. Aguiar   present during visit today: Not Applicable.    Note: Hoda presents today feeling well. Denies pain or nausea/vomiting. Offers no concerns since visit with Dr. Aguiar prior to infusion. Xgeva plan remains on hold due to prior osteonecrosis.      Intravenous Access:  Implanted Port.    Treatment Conditions:     Latest Reference Range & Units 04/25/24 09:35   Sodium 135 - 145 mmol/L 142   Potassium 3.4 - 5.3 mmol/L 4.2   Chloride 98 - 107 mmol/L 105   Carbon Dioxide (CO2) 22 - 29 mmol/L 27   Urea Nitrogen 8.0 - 23.0 mg/dL 14.1   Creatinine 0.51 - 0.95 mg/dL 0.79   GFR Estimate >60 mL/min/1.73m2 81   Calcium 8.8 - 10.2 mg/dL 9.6   Anion Gap 7 - 15 mmol/L 10   Albumin 3.5 - 5.2 g/dL 4.1   Protein Total 6.4 - 8.3 g/dL 7.6   Alkaline Phosphatase 40 - 150 U/L 49   ALT 0 - 50 U/L 42   AST 0 - 45 U/L 39   Bilirubin Total <=1.2 mg/dL 0.3   Glucose 70 - 99 mg/dL 97   WBC 4.0 - 11.0 10e3/uL 7.4   Hemoglobin 11.7 - 15.7 g/dL 12.6   Hematocrit 35.0 - 47.0 % 39.3   Platelet Count 150 - 450 10e3/uL 186   RBC Count 3.80 - 5.20 10e6/uL 4.23   MCV 78 - 100 fL 93   MCH 26.5 - 33.0 pg 29.8   MCHC 31.5 - 36.5 g/dL 32.1   RDW 10.0 - 15.0 % 13.5   % Neutrophils % 52   % Lymphocytes % 36   % Monocytes % 7   % Eosinophils % 5   % Basophils % 0   Absolute Basophils 0.0 - 0.2 10e3/uL 0.0   Absolute Eosinophils 0.0 - 0.7 10e3/uL 0.4   Absolute Immature Granulocytes <=0.4 10e3/uL 0.0   Absolute Lymphocytes 0.8 - 5.3 10e3/uL 2.7   Absolute Monocytes 0.0 - 1.3 10e3/uL 0.5   % Immature Granulocytes % 0   Absolute Neutrophils 1.6 - 8.3 10e3/uL 3.8   Absolute NRBCs 10e3/uL 0.0   NRBCs per 100 WBC <1 /100 0     Results reviewed, labs MET treatment parameters, ok to proceed with treatment.  ECHO/MUGA completed 01/23/24  EF 60-65%.      Post Infusion Assessment:  Patient tolerated infusion without  incident.  Blood return noted pre and post infusion.  Site patent and intact, free from redness, edema or discomfort.  No evidence of extravasations.  Access discontinued per protocol.       Discharge Plan:   Patient declined prescription refills.  Discharge instructions reviewed with: Patient.  Patient and/or family verbalized understanding of discharge instructions and all questions answered.  AVS to patient via [employer].  Patient will return 05/16 for next infusion appointment.   Patient discharged in stable condition accompanied by: self.  Departure Mode: Ambulatory.      Olivia Bains RN

## 2024-04-25 NOTE — NURSING NOTE
Chief Complaint   Patient presents with    Oncology Clinic Visit     Malignant neoplasm metastatic to bone     Port Draw     Labs collected from port by RN. Vitals taken. Checked in for appointment(s).      Port accessed with 20 gauge 3/4 inch flat needle by RN, labs collected, line flushed with saline and heparin.  Vitals taken. Pt checked in for appointment(s).     Kallie Aguilera RN

## 2024-04-25 NOTE — LETTER
4/25/2024         RE: Hoda Brush  [address]  [place] [address]        Dear Colleague,    Thank you for referring your patient, Hoda Brush, to the [facility]. Please see a copy of my visit note below.    ONCOLOGY NOTE    Hoda Brush  Female, 69 year old, 1955  MRN: [id number]      Hoda is a 69 year old patient from [facility] and is seen here for continuation of care for her metastatic ER+HER2- breast cancer.  She is a refugee from [facility] and was initially seen in clinic with her daughter.  The only data we have from [facility] is an English translation of her records.       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  On 02/11/2014, she was seen by an oncologist.  The clinical staging of T4b N2 M1 was noted.   Her breast cancer was on the left side. There was no right breast cancer, confirmed by the patient and her daughter, correcting a possible error in the translated records.  ER was positive in 100% of the cells, CT at 14% and HER2 was 3+ positive.  It appears that this histopathologic information is on the mastectomy specimen. She underwent an MRI for staging of presumptive bone metastases which was performed 03/02/2014.  There were skeletal metastases in the thoracic vertebrae at 12, L1, L3, L5 and S1 vertebral body, ranging in size from 0.7-3.0 cm in size.  Ischial and right femur were also involved, as well as a large iliac mass measuring about 15 cm in the uterus. 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   Radiation Oncology consultation was performed 03/11/2014, and she was given radiation in 1 dose of 6 Gy to the right iliac lesion.        With the initial diagnosis, she initiated treatment with 6 cycles of CAF neoadjuvant chemotherapy 02/14, 07/07, 03/28, 04/18/14, 05/08 and 05/29/14. She also received monthly zoledronic acid.  She then  underwent a modified left mastectomy of Palacios type and left lymphadenoectomy on 06/27/2014.   Pathologic examination showed number at [facility] was [id number] showed infiltrative grade 2 ductal cancer with 6 level 1 metastatic lympFollow up with Jossie for visits and trastuzumab on 2-5, 2-26, 3-19 with CBC, CMP.  Echocardiogram on 3-19.  Follow up with me 4-9 with CBC, CMP, CA27.29 and CEA and with CT CAP on 4-8.h nodes and 3 level 2 metastatic lymph nodes.  The differentiation was intermediate.  The tumor was ER positive 70% of the cells, RI positive in 40% of the cells and HER2 was 3+ by immunohistochemistry and the Ki-67 labeling index was 20%. Her staging after surgery was stage IV, pT4b N2 M1.  I don't see a biopsy of the skeletal metastases.  She then had continuation with chemotherapy with 4 cycles of Herceptin and taxane with monthly zoledronic acid.  Tamoxifen was initiated.  She then had two years of Herceptin and a decision was made not to continue further Herceptin.  She continued on zoledronic acid every 3 months. She was clinically stable. [place] then [place] the [place] new [place] from [facility].  She has now been changed to letrozole.  Denosumab has now been held for new diagnosis of osteonecrosis of the R maxilla.     History of cholecystectomy 2020.      TREATMENT HISTORY:  A. Initial diagnosis with metastatic breast cancer in [facility].  Neoadjuvant CAF x 6.   B. Left mastectomy. Left axillary node dissection.  C.  Radiation in 1 dose to R iliac region.  C. Herceptin for 2 years taxane for a prescribed course then stopped, monthly zoledronic acid.  She had 2 years of Herceptin with tamoxifen added after chemotherapy.  D.  Tamoxifen alone and zoledronic acid every 3 months starting 2014.  Letrozole was started   E.  [place]  We restarted Herceptin every 3 weeks and continued tamoxifen. Bone targeted agent changed to denosumab every 9 weeks. Zometa discontinued 2017.  Tamoxifen was  changed to letrozole August 2019.    F.  Xgeva discontinued 12-17-19 because of dental issues.   G.  J+J vaccine March, 2021.  H.  Was on Zometa once May 11.  Reaction with eye lid swelling. Discontinued Zometa.  H.  Restart Xgeva 6-22-21.  Continuing the trastuzumab and letrozole.  Both tolerated well.   I.  Discontinue Xgeva because of osteonecrosis of the maxilla.   Continue trastuzumab and letrozole.       INTERVAL HISTORY  She denies pain, fatigue, depression or anxiety.  She continues on trastuzumab, letrozole.  We have discontinued Xgeva because of osteonecrosis maxilla.  No pain, mild fatigue, no depression, no anxiety.  She has been feeling generally well.     REVIEW OF SYSTEMS:  A 10-point review of systems is entirely negative.     She is a eating a Mediterranean-style diet.  She is exercising by swimming.  I did advise adding a weightbearing exercise.  She takes vitamin D3 2000 International Units per day and calcium.  Her last DEXA scan performed a week ago shows a most valid negative T-score of -2.5 in the left hip, consistent with osteoporosis.     Notably, she has had no bowel or bladder problems.     PHYSICAL EXAMINATION:    VITALS:  /78   Pulse 79   Temp 98  F (36.7  C)   Resp 16   Wt 80.3 kg (177 lb)   SpO2 98%   BMI 32.16 kg/m    GENERAL:  Hoda appeared generally well.  HEENT:  She has no alopecia.  Examination of oropharynx reveals good dentition.  LYMPH:  There is no cervical, supraclavicular, subclavicular or axillary lymphadenopathy.  BREASTS: Breast exam deferred today.  LUNGS:  Clear to percussion and auscultation.  CARDIAC:  Heart has a regular rate and rhythm, S1, S2.  ABDOMEN:  Soft, nontender, without hepatosplenomegaly.  EXTREMITIES:  Without edema.  PSYCH:  Mood and affect were normal.     LABORATORY DATA:  CMP and CBC within normal limits.  CEA, CA27.29 pending.      CT Chest/Abdomen/Pelvis 4/12/23  IMPRESSION:  1.  Stable appearance of sclerotic osseous lesions.  2.   Stable small pulmonary nodules.  3.  No new disease in the chest, abdomen and pelvis.        ASSESSMENT AND PLAN:       1.  Hoda Brush is a 69-year-old woman with a history of ER-positive, [place]-positive, HER2 positive breast cancer.  She is from [facility] and came to live in the [place] with [place].  The tumor is ER positive, [place] positive and HER2 positive.  She had metastatic disease at the time of presentation with bone-only metastases by report.  She underwent neoadjuvant CAF, had a left mastectomy, left axillary lymph node dissection, radiation to the right hip, which included the right iliac region where she had metastatic disease.  She was initially on tamoxifen but then was switched to letrozole.  She continues on this and every 3-week trastuzumab.  She has had no evidence of disease progression for the last 7 years and possible longer but we don't have detailed data from [facility].  Markers are low and stable.  We have continued Herceptin every 3 weeks as well as daily letrozole. CT CAP shows stable pulmonary nodules.   2.  Hoda Brush continues to do well on a combination of trastuzumab and letrozole. She has no evidence of disease progression.   Her ejection fraction remains stable.   She continues to do well on trastuzumab and letrozole.  She has no evidence of disease progression.  She will continue with every 4-month CT of the chest, abdomen and pelvis.    3.   The right maxillary osteonecrosis has healed and she is able to continue with Xgeva every 3 months. Hold the Xgeva and repeat Dexa scan in 2025.    4.  Continue the letrozole and trastuzuamb.  Letrozole has been well-tolerated, as has trastuzuamb.  No significant joint stiffness.  5.  Echo. Stable EF.  60-65%.  6.  Discussion of bone health and right maxillary osteonecrosis.  All healed.   Xgeva is being continued.   7. Follow up.  Continue Herceptin through the end of 2025 alternating providers every 6 weeks. CT CAP  every 4 months, next 8-28 follow up with me 8-29 with trastuzumab.   Echo July 18.  Discontinue Xgeva.  Dexa in 2025.  CBC, CMP every 3 weeks and CA27.29 and CEA every 6 weeks. Echo every 4 months, next on January 23.  CT 4 months       Thank you for allowing us to participate in this patient's care.       Sincerely,      Lisandro Aguiar MD  Professor  [facility]  [phone]           I spent 35 minutes with the patient more than 50% of which was in counseling and coordination of care.

## 2024-04-25 NOTE — NURSING NOTE
"Oncology Rooming Note    April 25, 2024 9:41 AM   Hoda Brush is a 69 year old female who presents for:    Chief Complaint   Patient presents with    Oncology Clinic Visit     Malignant neoplasm metastatic to bone     Port Draw     Labs collected from port by RN. Vitals taken. Checked in for appointment(s).      Initial Vitals: /78   Pulse 79   Temp 98  F (36.7  C)   Resp 16   Wt 80.3 kg (177 lb)   SpO2 98%   BMI 32.16 kg/m   Estimated body mass index is 32.16 kg/m  as calculated from the following:    Height as of 7/11/23: 1.58 m (5' 2.21\").    Weight as of this encounter: 80.3 kg (177 lb). Body surface area is 1.88 meters squared.  No Pain (0) Comment: Data Unavailable   No LMP recorded. Patient is postmenopausal.  Allergies reviewed: Yes  Medications reviewed: Yes    Medications: Medication refills not needed today.  Pharmacy name entered into [employer]: [id number] [place], [place] - [address]    Frailty Screening:   Is the patient here for a new oncology consult visit in cancer care? 2. No      Clinical concerns: no other complaints      Kulwant Montague"

## 2024-04-25 NOTE — PATIENT INSTRUCTIONS
[facility] Triage and after hours / weekends / holidays:  [phone]    Please call the triage or after hours line if you experience a temperature greater than or equal to 100.4, shaking chills, have uncontrolled nausea, vomiting and/or diarrhea, dizziness, shortness of breath, chest pain, bleeding, unexplained bruising, or if you have any other new/concerning symptoms, questions, or concerns.      If you are having any concerning symptoms or wish to speak to a provider before your next infusion visit, please call your care coordinator or triage to notify them so we can adequately serve you.     If you need a refill on a narcotic prescription or other medication, please call before your infusion appointment.

## 2024-04-26 LAB — CANCER AG27-29 SERPL-ACNC: 10.9 U/ML

## 2024-05-01 ENCOUNTER — MYC MEDICAL ADVICE (OUTPATIENT)
Dept: PODIATRY | Facility: CLINIC | Age: 69
End: 2024-05-01
Payer: COMMERCIAL

## 2024-05-02 NOTE — TELEPHONE ENCOUNTER
Patient had permanent ingrown nail removal left great toe with Dr. Alfaro on 2/12/24. She was seen by Dr. Whipple on 3/27/24 and had the other border removed.     Please see patient MyChart message with attached pictures and advise on recommendations.     Yahaira Wilder, ATC

## 2024-05-02 NOTE — TELEPHONE ENCOUNTER
The toe looks okay and does not look infeced.  It sounds like there is less swelling and redness per her mychart message.   Swelling to the area is not uncommon especially with multiple procedures to the area.     If she is still concerned about her toe, I would recommend her follow up in clinic.     Rachel Whipple DPM

## 2024-05-07 SDOH — HEALTH STABILITY: PHYSICAL HEALTH: ON AVERAGE, HOW MANY DAYS PER WEEK DO YOU ENGAGE IN MODERATE TO STRENUOUS EXERCISE (LIKE A BRISK WALK)?: 6 DAYS

## 2024-05-07 SDOH — HEALTH STABILITY: PHYSICAL HEALTH: ON AVERAGE, HOW MANY MINUTES DO YOU ENGAGE IN EXERCISE AT THIS LEVEL?: 30 MIN

## 2024-05-07 ASSESSMENT — SOCIAL DETERMINANTS OF HEALTH (SDOH): HOW OFTEN DO YOU GET TOGETHER WITH FRIENDS OR RELATIVES?: TWICE A WEEK

## 2024-05-13 SDOH — HEALTH STABILITY: PHYSICAL HEALTH: ON AVERAGE, HOW MANY MINUTES DO YOU ENGAGE IN EXERCISE AT THIS LEVEL?: 30 MIN

## 2024-05-13 SDOH — HEALTH STABILITY: PHYSICAL HEALTH
ON AVERAGE, HOW MANY DAYS PER WEEK DO YOU ENGAGE IN MODERATE TO STRENUOUS EXERCISE (LIKE A BRISK WALK)?: PATIENT DECLINED

## 2024-05-13 ASSESSMENT — SOCIAL DETERMINANTS OF HEALTH (SDOH): HOW OFTEN DO YOU GET TOGETHER WITH FRIENDS OR RELATIVES?: TWICE A WEEK

## 2024-05-14 ENCOUNTER — OFFICE VISIT (OUTPATIENT)
Dept: FAMILY MEDICINE | Facility: CLINIC | Age: 69
End: 2024-05-14
Payer: COMMERCIAL

## 2024-05-14 VITALS
SYSTOLIC BLOOD PRESSURE: 125 MMHG | HEART RATE: 77 BPM | TEMPERATURE: 97.4 F | DIASTOLIC BLOOD PRESSURE: 71 MMHG | WEIGHT: 177.5 LBS | BODY MASS INDEX: 32.66 KG/M2 | RESPIRATION RATE: 16 BRPM | HEIGHT: 62 IN | OXYGEN SATURATION: 96 %

## 2024-05-14 DIAGNOSIS — Z90.12 S/P MASTECTOMY, LEFT: ICD-10-CM

## 2024-05-14 DIAGNOSIS — E78.5 HYPERLIPIDEMIA, UNSPECIFIED HYPERLIPIDEMIA TYPE: ICD-10-CM

## 2024-05-14 DIAGNOSIS — Z80.3 FAMILY HISTORY OF MALIGNANT NEOPLASM OF BREAST: ICD-10-CM

## 2024-05-14 DIAGNOSIS — K76.0 FATTY LIVER: ICD-10-CM

## 2024-05-14 DIAGNOSIS — Z13.29 SCREENING FOR THYROID DISORDER: ICD-10-CM

## 2024-05-14 DIAGNOSIS — J31.0 CHRONIC RHINITIS: ICD-10-CM

## 2024-05-14 DIAGNOSIS — Z00.00 ROUTINE GENERAL MEDICAL EXAMINATION AT A HEALTH CARE FACILITY: Primary | ICD-10-CM

## 2024-05-14 DIAGNOSIS — M87.9 OSTEONECROSIS, UNSPECIFIED (H): ICD-10-CM

## 2024-05-14 DIAGNOSIS — C50.919 PRIMARY MALIGNANT NEOPLASM OF BREAST WITH METASTASIS (H): ICD-10-CM

## 2024-05-14 DIAGNOSIS — R91.8 PULMONARY NODULES: ICD-10-CM

## 2024-05-14 PROCEDURE — 99213 OFFICE O/P EST LOW 20 MIN: CPT | Mod: 25 | Performed by: INTERNAL MEDICINE

## 2024-05-14 PROCEDURE — 99397 PER PM REEVAL EST PAT 65+ YR: CPT | Performed by: INTERNAL MEDICINE

## 2024-05-14 RX ORDER — FLUTICASONE PROPIONATE 50 MCG
2 SPRAY, SUSPENSION (ML) NASAL DAILY
Qty: 16 G | Refills: 3 | Status: SHIPPED | OUTPATIENT
Start: 2024-05-14

## 2024-05-14 ASSESSMENT — PAIN SCALES - GENERAL: PAINLEVEL: NO PAIN (0)

## 2024-05-14 NOTE — PROGRESS NOTES
"Preventive Care Visit  [facility]  Laury Dee MD, Internal Medicine  May 14, 2024      Assessment & Plan     Routine general medical examination at a health care facility  Preventative health counseling was also done.   She takes calcium and vitamin D   Last colonoscopy on 01/24/2018  Last DEXA on 07/14/2023  UGI endoscopy on 02/05/2020  Declines RSV vaccine and COVID     Hyperlipidemia, unspecified hyperlipidemia type  Will be checked with blood work in the future. She's not taking any medications to manage cholesterol.  - Lipid panel reflex to direct LDL Fasting    S/P mastectomy, left  Past history due to breast cancer. Dr. Aguiar discontinued XGEVA. Followed by oncology.  - Orthotics, Mastectomy and Custom Compression Orders    Metastatic breast cancer  Followed by oncology. Notes reviewed.   Has been stable     Fatty liver seen on CT  Discussed the importance of healthy diet, exercise, and losing weight.     Family history of malignant neoplasm of breast    Osteonecrosis, unspecified (H)  She isn't taking any medications for osteoporosis due to complications raised by osteonecrosis of jaw.     Screening for thyroid disorder  Will be checked with future blood work  - TSH with free T4 reflex    Pulmonary nodules  Stable   Follows oncologist     Chronic rhinitis  She has allergies and uses Claritin  - fluticasone (FLONASE) 50 MCG/ACT nasal spray  Dispense: 16 g; Refill: 3    BMI  Estimated body mass index is 32.66 kg/m  as calculated from the following:    Height as of this encounter: 1.57 m (5' 1.81\").    Weight as of this encounter: 80.5 kg (177 lb 8 oz).   Weight management plan: Discussed healthy diet and exercise guidelines    Counseling  Appropriate preventive services were discussed with this patient, including applicable screening as appropriate for fall prevention, nutrition, physical activity, Tobacco-use cessation, weight loss and cognition.  Checklist reviewing preventive " services available has been given to the patient.  Reviewed patient's diet, addressing concerns and/or questions.   The patient was provided with written information regarding signs of hearing loss.       Subjective   Patient has been advised of split billing requirements and indicates understanding: Yes  Hoda is a 69 year old, presenting for the following:  Physical         Health Care Directive  Patient has a Health Care Directive on file  Advance care planning document is on file and is current.    HPI        5/13/2024   General Health   How would you rate your overall physical health? Good   Feel stress (tense, anxious, or unable to sleep) Not at all         5/13/2024   Nutrition   Diet: Low salt    Low fat/cholesterol         5/13/2024   Exercise   Days per week of moderate/strenous exercise Patient declined   Average minutes spent exercising at this level 30 min         5/13/2024   Social Factors   Frequency of gathering with friends or relatives Twice a week   Worry food won't last until get money to buy more No   Food not last or not have enough money for food? No   Do you have housing?  Yes   Are you worried about losing your housing? No   Lack of transportation? No   Unable to get utilities (heat,electricity)? No         5/13/2024   Fall Risk   Fallen 2 or more times in the past year? No   Trouble with walking or balance? No          5/13/2024   Activities of Daily Living- Home Safety   Needs help with the following daily activites None of the above   Safety concerns in the home None of the above         5/13/2024   Dental   Dentist two times every year? Yes         5/13/2024   Hearing Screening   Hearing concerns? (!) TROUBLE UNDERSTANDING SPEECH ON THE TELEPHONE         5/13/2024   Driving Risk Screening   Patient/family members have concerns about driving No         5/13/2024   General Alertness/Fatigue Screening   Have you been more tired than usual lately? No         5/13/2024   Urinary Incontinence  Screening   Bothered by leaking urine in past 6 months No            Today's PHQ-2 Score:       5/13/2024    10:19 AM   PHQ-2 ( 1999 [employer])   Q1: Little interest or pleasure in doing things 0   Q2: Feeling down, depressed or hopeless 0   PHQ-2 Score 0   Q1: Little interest or pleasure in doing things Not at all   Q2: Feeling down, depressed or hopeless Not at all   PHQ-2 Score 0           5/13/2024   Substance Use   Alcohol more than 3/day or more than 7/wk Not Applicable   Do you have a current opioid prescription? No   How severe/bad is pain from 1 to 10? 7/10   Do you use any other substances recreationally? (!) DECLINE     Social History     Tobacco Use    Smoking status: Never     Passive exposure: Never    Smokeless tobacco: Never   Vaping Use    Vaping status: Never Used   Substance Use Topics    Alcohol use: No    Drug use: No          Mammogram Screening - Annual screen due to history of breast cancer, carcinoma in situ, or hyperplasia    ASCVD Risk   The 10-year ASCVD risk score (Winter SIMENTAL, et al., 2019) is: 7.2%    Values used to calculate the score:      Age: 69 years      Sex: Female      Is Non- : No      Diabetic: No      Tobacco smoker: No      Systolic Blood Pressure: 115 mmHg      Is BP treated: No      HDL Cholesterol: 45 mg/dL      Total Cholesterol: 244 mg/dL          Reviewed and updated as needed this visit by Provider                    Current providers sharing in care for this patient include:  Patient Care Team:  Laury Dee MD as PCP - General (Internal Medicine)  Lisandro Aguiar MD as MD (Oncology)  Laury Dee MD as Assigned PCP  Laury Dee MD as Referring Physician (Internal Medicine)  Gwen Walters APRN CNP as Nurse Practitioner (Dermatology)  Lakesha Ventura RN as Lead Care Coordinator (Primary Care - CC)  Kassidy Robles APRN CNP as Assigned Cancer Care Provider  Rachel Whipple DPM, Podiatry/Foot and Ankle  "Surgery as Assigned Musculoskeletal Provider    The following health maintenance items are reviewed in Epic and correct as of today:  Health Maintenance   Topic Date Due    RSV VACCINE (Pregnancy & 60+) (1 - 1-dose 60+ series) Never done    COVID-19 Vaccine (6 - 2023-24 season) 12/27/2023    MEDICARE ANNUAL WELLNESS VISIT  07/11/2024    ANNUAL REVIEW OF HM ORDERS  07/11/2024    INFLUENZA VACCINE (Season Ended) 09/01/2024    FALL RISK ASSESSMENT  05/14/2025    GLUCOSE  04/25/2027    COLORECTAL CANCER SCREENING  02/01/2028    LIPID  07/06/2028    ADVANCE CARE PLANNING  07/11/2028    DTAP/TDAP/TD IMMUNIZATION (2 - Td or Tdap) 07/21/2030    DEXA  07/14/2038    HEPATITIS C SCREENING  Completed    PHQ-2 (once per calendar year)  Completed    Pneumococcal Vaccine: 65+ Years  Completed    ZOSTER IMMUNIZATION  Completed    IPV IMMUNIZATION  Aged Out    HPV IMMUNIZATION  Aged Out    MENINGITIS IMMUNIZATION  Aged Out    RSV MONOCLONAL ANTIBODY  Aged Out    PAP  Discontinued         Review of Systems  Constitutional, HEENT, cardiovascular, pulmonary, gi and gu systems are negative, except as otherwise noted.     Objective    Exam  There were no vitals taken for this visit.   Estimated body mass index is 32.16 kg/m  as calculated from the following:    Height as of 7/11/23: 1.58 m (5' 2.21\").    Weight as of 4/25/24: 80.3 kg (177 lb).    Physical Exam  GENERAL: alert and no distress  EYES: Eyes grossly normal to inspection, PERRL and conjunctivae and sclerae normal  HENT: ear canals and TM's normal, nose and mouth without ulcers or lesions  NECK: no adenopathy,   RESP: lungs clear to auscultation - no rales, rhonchi or wheezes  CV: regular rate and rhythm, normal S1 S2, no S3 or S4, no murmur, click or rub, no peripheral edema  ABDOMEN: soft, nontender, no hepatosplenomegaly, no masses and bowel sounds normal  MS: no gross musculoskeletal defects noted, no edema  SKIN: [id number] spots on neck and chest.   BREASTS: She has a " scar on her left breast from mastectomy. Right breast negative for tenderness, breast mass and discharge.   OTHER: slight bilateral swelling of legs and feet  NEURO:  mentation intact and speech normal  PSYCH: mentation appears normal, affect normal/bright        8/28/2019   Mini Cog   Clock Draw Score 2 Normal   3 Item Recall 2 objects recalled   Mini Cog Total Score 4         This document serves as a record of the services and decisions personally performed and made by Dr. Dee. It was created on her behalf by Judy Shahid, a trained medical scribe. The creation of this document is based the provider's statements to the medical scribe.    Signed Electronically by: Laury Dee MD

## 2024-05-14 NOTE — PATIENT INSTRUCTIONS
"Respiratory syncytial virus (RSV) is an important cause of lower respiratory tract disease in older adults.   In 2023, the [place] Food and Drug Administration approved two recombinant RSV vaccines for the prevention of lower respiratory tract disease in individuals 60 years of age and older [1,2].     this vaccine prevented RSV-related respiratory infection and lower respiratory tract disease in adults age 60 years and older who received one dose of an AS01E-adjuvanted RSV Prefusion F Protein Vaccine [51].     This infection may cause serious infection like pneumonia in older patients     So it is good idea to get this RSV vaccine from any local pharmacy .    You are due for covid booster    Get lab work done with your next lab appointment     Follow up in one year for physical   Seek sooner medical attention if there is any worsening of symptoms or problems.     Preventive Care Advice   This is general advice we often give to help people stay healthy. Your care team may have specific advice just for you. Please talk to your care team about your own preventive care needs.  Lifestyle  Exercise at least 150 minutes each week (30 minutes a day, 5 days a week).  Do muscle strengthening activities 2 days a week. These help control your weight and prevent disease.  No smoking.  Wear sunscreen to prevent skin cancer.  Have your home tested for radon every 2 to 5 years. Radon is a colorless, odorless gas that can harm your lungs. To learn more, go to www.health.[facility].[place]. and search for \"Radon in Homes.\"  Keep guns unloaded and locked up in a safe place like a safe or gun vault, or, use a gun lock and hide the keys. Always lock away bullets separately. To learn more, visit [employer].[place].gov and search for \"safe gun storage.\"  Nutrition  Eat 5 or more servings of fruits and vegetables each day.  Try wheat bread, brown rice and whole grain pasta (instead of white bread, rice, and pasta).  Get enough calcium and vitamin D. Check the " label on foods and aim for 100% of the RDA (recommended daily allowance).  Regular exams  Have a dental exam and cleaning every 6 months.  See your health care team every year to talk about:  Any changes in your health.  Any medicines your care team has prescribed.  Preventive care, family planning, and ways to prevent chronic diseases.  Shots (vaccines)   HPV shots (up to age 26), if you've never had them before.  Hepatitis B shots (up to age 59), if you've never had them before.  COVID-19 shot: Get this shot when it's due.  Flu shot: Get a flu shot every year.  Tetanus shot: Get a tetanus shot every 10 years.  Pneumococcal, hepatitis A, and RSV shots: Ask your care team if you need these based on your risk.  Shingles shot (for age 50 and up).  General health tests  Diabetes screening:  Starting at age 35, Get screened for diabetes at least every 3 years.  If you are younger than age 35, ask your care team if you should be screened for diabetes.  Cholesterol test: At age 39, start having a cholesterol test every 5 years, or more often if advised.  Bone density scan (DEXA): At age 50, ask your care team if you should have this scan for osteoporosis (brittle bones).  Hepatitis C: Get tested at least once in your life.  Abdominal aortic aneurysm screening: Talk to your doctor about having this screening if you:  Have ever smoked; and  Are biologically male; and  Are between the ages of 65 and 75.  STIs (sexually transmitted infections)  Before age 24: Ask your care team if you should be screened for STIs.  After age 24: Get screened for STIs if you're at risk. You are at risk for STIs (including HIV) if:  You are sexually active with more than one person.  You don't use condoms every time.  You or a partner was diagnosed with a sexually transmitted infection.  If you are at risk for HIV, ask about PrEP medicine to prevent HIV.  Get tested for HIV at least once in your life, whether you are at risk for HIV or  not.  Cancer screening tests  Cervical cancer screening: If you have a cervix, begin getting regular cervical cancer screening tests at age 21. Most people who have regular screenings with normal results can stop after age 65. Talk about this with your provider.  Breast cancer scan (mammogram): If you've ever had breasts, begin having regular mammograms starting at age 40. This is a scan to check for breast cancer.  Colon cancer screening: It is important to start screening for colon cancer at age 45.  Have a colonoscopy test every 10 years (or more often if you're at risk) Or, ask your provider about stool tests like a FIT test every year or Cologuard test every 3 years.  To learn more about your testing options, visit: www.xAd/513111.pdf.  For help making a decision, visit: curtis/xk14571.  Prostate cancer screening test: If you have a prostate and are age 55 to 69, ask your provider if you would benefit from a yearly prostate cancer screening test.  Lung cancer screening: If you are a current or former smoker age 50 to 80, ask your care team if ongoing lung cancer screenings are right for you.  For informational purposes only. Not to replace the advice of your health care provider. Copyright   2023 [facility] [employer]. All rights reserved. Clinically reviewed by the [facility] Transitions Program. [employer] 237805 - REV 04/24.    Hearing Loss: Care Instructions  Overview     Hearing loss is a sudden or slow decrease in how well you hear. It can range from slight to profound. Permanent hearing loss can occur with aging. It also can happen when you are exposed long-term to loud noise. Examples include listening to loud music, riding motorcycles, or being around other loud machines.  Hearing loss can affect your work and home life. It can make you feel lonely or depressed. You may feel that you have lost your independence. But hearing aids and other devices can help you hear better and feel  connected to others.  Follow-up care is a key part of your treatment and safety. Be sure to make and go to all appointments, and call your doctor if you are having problems. It's also a good idea to know your test results and keep a list of the medicines you take.  How can you care for yourself at home?  Avoid loud noises whenever possible. This helps keep your hearing from getting worse.  Always wear hearing protection around loud noises.  Wear a hearing aid as directed.  A professional can help you pick a hearing aid that will work best for you.  You can also get hearing aids over the counter for mild to moderate hearing loss.  Have hearing tests as your doctor suggests. They can show whether your hearing has changed. Your hearing aid may need to be adjusted.  Use other devices as needed. These may include:  Telephone amplifiers and hearing aids that can connect to a television, stereo, radio, or microphone.  Devices that use lights or vibrations. These alert you to the doorbell, a ringing telephone, or a baby monitor.  Television closed-captioning. This shows the words at the bottom of the screen. Most new TVs can do this.  TTY (text telephone). This lets you type messages back and forth on the telephone instead of talking or listening. These devices are also called TDD. When messages are typed on the keyboard, they are sent over the phone line to a receiving TTY. The message is shown on a monitor.  Use text messaging, social media, and email if it is hard for you to communicate by telephone.  Try to learn a listening technique called speechreading. It is not lipreading. You pay attention to people's gestures, expressions, posture, and tone of voice. These clues can help you understand what a person is saying. Face the person you are talking to, and have them face you. Make sure the lighting is good. You need to see the other person's face clearly.  Think about counseling if you need help to adjust to your  "hearing loss.  When should you call for help?  Watch closely for changes in your health, and be sure to contact your doctor if:    You think your hearing is getting worse.     You have new symptoms, such as dizziness or nausea.   Where can you learn more?  Go to https://www.[employer].net/patiented  Enter R798 in the search box to learn more about \"Hearing Loss: Care Instructions.\"  Current as of: September 27, 2023               Content Version: 14.0    7742-8211 [employer].   Care instructions adapted under license by your healthcare professional. If you have questions about a medical condition or this instruction, always ask your healthcare professional. [employer] disclaims any warranty or liability for your use of this information.      "

## 2024-05-16 ENCOUNTER — INFUSION THERAPY VISIT (OUTPATIENT)
Dept: ONCOLOGY | Facility: CLINIC | Age: 69
End: 2024-05-16
Attending: NURSE PRACTITIONER
Payer: COMMERCIAL

## 2024-05-16 ENCOUNTER — APPOINTMENT (OUTPATIENT)
Dept: LAB | Facility: CLINIC | Age: 69
End: 2024-05-16
Attending: INTERNAL MEDICINE
Payer: COMMERCIAL

## 2024-05-16 VITALS
BODY MASS INDEX: 32.88 KG/M2 | TEMPERATURE: 98.1 F | HEART RATE: 89 BPM | WEIGHT: 178.7 LBS | OXYGEN SATURATION: 95 % | RESPIRATION RATE: 16 BRPM | DIASTOLIC BLOOD PRESSURE: 71 MMHG | SYSTOLIC BLOOD PRESSURE: 113 MMHG

## 2024-05-16 DIAGNOSIS — C79.51 MALIGNANT NEOPLASM METASTATIC TO BONE (H): ICD-10-CM

## 2024-05-16 DIAGNOSIS — Z13.29 SCREENING FOR THYROID DISORDER: ICD-10-CM

## 2024-05-16 DIAGNOSIS — C50.912 MALIGNANT NEOPLASM OF LEFT FEMALE BREAST, UNSPECIFIED ESTROGEN RECEPTOR STATUS, UNSPECIFIED SITE OF BREAST (H): Primary | ICD-10-CM

## 2024-05-16 DIAGNOSIS — E78.5 HYPERLIPIDEMIA, UNSPECIFIED HYPERLIPIDEMIA TYPE: ICD-10-CM

## 2024-05-16 LAB
ALBUMIN SERPL BCG-MCNC: 4.1 G/DL (ref 3.5–5.2)
ALP SERPL-CCNC: 52 U/L (ref 40–150)
ALT SERPL W P-5'-P-CCNC: 40 U/L (ref 0–50)
ANION GAP SERPL CALCULATED.3IONS-SCNC: 9 MMOL/L (ref 7–15)
AST SERPL W P-5'-P-CCNC: 40 U/L (ref 0–45)
BASOPHILS # BLD AUTO: 0 10E3/UL (ref 0–0.2)
BASOPHILS NFR BLD AUTO: 1 %
BILIRUB SERPL-MCNC: 0.3 MG/DL
BUN SERPL-MCNC: 11.9 MG/DL (ref 8–23)
CALCIUM SERPL-MCNC: 9.2 MG/DL (ref 8.8–10.2)
CHLORIDE SERPL-SCNC: 105 MMOL/L (ref 98–107)
CHOLEST SERPL-MCNC: 216 MG/DL
CREAT SERPL-MCNC: 0.82 MG/DL (ref 0.51–0.95)
DEPRECATED HCO3 PLAS-SCNC: 27 MMOL/L (ref 22–29)
EGFRCR SERPLBLD CKD-EPI 2021: 77 ML/MIN/1.73M2
EOSINOPHIL # BLD AUTO: 0.3 10E3/UL (ref 0–0.7)
EOSINOPHIL NFR BLD AUTO: 4 %
ERYTHROCYTE [DISTWIDTH] IN BLOOD BY AUTOMATED COUNT: 13.7 % (ref 10–15)
FASTING STATUS PATIENT QL REPORTED: YES
GLUCOSE SERPL-MCNC: 85 MG/DL (ref 70–99)
HCT VFR BLD AUTO: 40.1 % (ref 35–47)
HDLC SERPL-MCNC: 45 MG/DL
HGB BLD-MCNC: 12.6 G/DL (ref 11.7–15.7)
IMM GRANULOCYTES # BLD: 0 10E3/UL
IMM GRANULOCYTES NFR BLD: 0 %
LDLC SERPL CALC-MCNC: 133 MG/DL
LYMPHOCYTES # BLD AUTO: 3.2 10E3/UL (ref 0.8–5.3)
LYMPHOCYTES NFR BLD AUTO: 40 %
MCH RBC QN AUTO: 29.3 PG (ref 26.5–33)
MCHC RBC AUTO-ENTMCNC: 31.4 G/DL (ref 31.5–36.5)
MCV RBC AUTO: 93 FL (ref 78–100)
MONOCYTES # BLD AUTO: 0.5 10E3/UL (ref 0–1.3)
MONOCYTES NFR BLD AUTO: 7 %
NEUTROPHILS # BLD AUTO: 3.9 10E3/UL (ref 1.6–8.3)
NEUTROPHILS NFR BLD AUTO: 48 %
NONHDLC SERPL-MCNC: 171 MG/DL
NRBC # BLD AUTO: 0 10E3/UL
NRBC BLD AUTO-RTO: 0 /100
PLATELET # BLD AUTO: 215 10E3/UL (ref 150–450)
POTASSIUM SERPL-SCNC: 4.3 MMOL/L (ref 3.4–5.3)
PROT SERPL-MCNC: 7.6 G/DL (ref 6.4–8.3)
RBC # BLD AUTO: 4.3 10E6/UL (ref 3.8–5.2)
SODIUM SERPL-SCNC: 141 MMOL/L (ref 135–145)
TRIGL SERPL-MCNC: 190 MG/DL
TSH SERPL DL<=0.005 MIU/L-ACNC: 0.47 UIU/ML (ref 0.3–4.2)
WBC # BLD AUTO: 7.9 10E3/UL (ref 4–11)

## 2024-05-16 PROCEDURE — 250N000011 HC RX IP 250 OP 636: Performed by: NURSE PRACTITIONER

## 2024-05-16 PROCEDURE — 82465 ASSAY BLD/SERUM CHOLESTEROL: CPT

## 2024-05-16 PROCEDURE — 84443 ASSAY THYROID STIM HORMONE: CPT

## 2024-05-16 PROCEDURE — 85025 COMPLETE CBC W/AUTO DIFF WBC: CPT | Performed by: INTERNAL MEDICINE

## 2024-05-16 PROCEDURE — 258N000003 HC RX IP 258 OP 636: Performed by: INTERNAL MEDICINE

## 2024-05-16 PROCEDURE — 84155 ASSAY OF PROTEIN SERUM: CPT | Performed by: INTERNAL MEDICINE

## 2024-05-16 PROCEDURE — 36591 DRAW BLOOD OFF VENOUS DEVICE: CPT

## 2024-05-16 PROCEDURE — 82378 CARCINOEMBRYONIC ANTIGEN: CPT | Performed by: INTERNAL MEDICINE

## 2024-05-16 PROCEDURE — 96413 CHEMO IV INFUSION 1 HR: CPT

## 2024-05-16 PROCEDURE — 250N000011 HC RX IP 250 OP 636: Mod: JZ | Performed by: INTERNAL MEDICINE

## 2024-05-16 PROCEDURE — 82040 ASSAY OF SERUM ALBUMIN: CPT | Performed by: INTERNAL MEDICINE

## 2024-05-16 PROCEDURE — 86300 IMMUNOASSAY TUMOR CA 15-3: CPT | Performed by: INTERNAL MEDICINE

## 2024-05-16 RX ORDER — HEPARIN SODIUM (PORCINE) LOCK FLUSH IV SOLN 100 UNIT/ML 100 UNIT/ML
5 SOLUTION INTRAVENOUS EVERY 8 HOURS
Status: DISCONTINUED | OUTPATIENT
Start: 2024-05-16 | End: 2024-05-16 | Stop reason: HOSPADM

## 2024-05-16 RX ORDER — HEPARIN SODIUM (PORCINE) LOCK FLUSH IV SOLN 100 UNIT/ML 100 UNIT/ML
5 SOLUTION INTRAVENOUS ONCE
Status: COMPLETED | OUTPATIENT
Start: 2024-05-16 | End: 2024-05-16

## 2024-05-16 RX ADMIN — Medication 5 ML: at 13:09

## 2024-05-16 RX ADMIN — Medication 5 ML: at 14:21

## 2024-05-16 RX ADMIN — TRASTUZUMAB 450 MG: 150 INJECTION, POWDER, LYOPHILIZED, FOR SOLUTION INTRAVENOUS at 13:50

## 2024-05-16 ASSESSMENT — PAIN SCALES - GENERAL: PAINLEVEL: NO PAIN (0)

## 2024-05-16 NOTE — PROGRESS NOTES
Infusion Nursing Note:  Hoda Brush presents today for C114D1 Herceptin.    Patient seen by provider today: No   present during visit today: Not Applicable.    Note: Patient reports at her baseline. Denies fever/chills. Denies nausea/vomiting. Denies chest and abdominal discomfort. No new concern      Intravenous Access:  Peripheral IV placed.    Treatment Conditions:  Lab Results   Component Value Date    HGB 12.6 05/16/2024    WBC 7.9 05/16/2024    ANEU 3.1 06/22/2021    ANEUTAUTO 3.9 05/16/2024     05/16/2024        Lab Results   Component Value Date     05/16/2024    POTASSIUM 4.3 05/16/2024    CR 0.82 05/16/2024    TAYLER 9.2 05/16/2024    BILITOTAL 0.3 05/16/2024    ALBUMIN 4.1 05/16/2024    ALT 40 05/16/2024    AST 40 05/16/2024       Results reviewed, labs MET treatment parameters, ok to proceed with treatment.  ECHO/MUGA completed 1/23/24  EF 60-65%.      Post Infusion Assessment:  Patient tolerated infusion without incident.  Blood return noted pre and post infusion.  Site patent and intact, free from redness, edema or discomfort.  No evidence of extravasations.  Access discontinued per protocol.       Discharge Plan:   Patient declined prescription refills.  Discharge instructions reviewed with: Patient.  Patient and/or family verbalized understanding of discharge instructions and all questions answered.  AVS to patient via SubmittableHART.  Patient will return 6/6/24 for next appointment.   Patient discharged in stable condition accompanied by: self.  Departure Mode: Ambulatory.      EMILY WICK RN

## 2024-05-17 LAB — CEA SERPL-MCNC: 1.2 NG/ML

## 2024-05-18 LAB — CANCER AG27-29 SERPL-ACNC: <9 U/ML

## 2024-05-20 NOTE — RESULT ENCOUNTER NOTE
Phil Drummond    This is to inform you regarding your test result.    Your total cholesterol is elevated.  The triglycerides are high. Lowering  the amount of sugar ,alcohol and sweets in the diet helps to control this.Exercise and weight loss helps.  HDL which is called good cholesterol is low.  Your LDL which is called bad cholesterol is elevated.  Eat low cholesterol low fat  diet and do regular physical activity. Avoid high sugar containing food.  TSH which is thyroid hormone is normal.          Sincerely,      Dr.Nasima Luiz MD,FACP

## 2024-06-04 ENCOUNTER — HOSPITAL ENCOUNTER (OUTPATIENT)
Dept: CARDIOLOGY | Facility: CLINIC | Age: 69
Discharge: HOME | End: 2024-06-04
Attending: NURSE PRACTITIONER | Admitting: NURSE PRACTITIONER
Payer: COMMERCIAL

## 2024-06-04 DIAGNOSIS — Z79.899 ENCOUNTER FOR MONITORING CARDIOTOXIC DRUG THERAPY: ICD-10-CM

## 2024-06-04 DIAGNOSIS — Z51.81 ENCOUNTER FOR MONITORING CARDIOTOXIC DRUG THERAPY: ICD-10-CM

## 2024-06-04 DIAGNOSIS — C50.912 MALIGNANT NEOPLASM OF LEFT FEMALE BREAST, UNSPECIFIED ESTROGEN RECEPTOR STATUS, UNSPECIFIED SITE OF BREAST (H): ICD-10-CM

## 2024-06-04 LAB — BI-PLANE LVEF ECHO: NORMAL

## 2024-06-04 PROCEDURE — 255N000002 HC RX 255 OP 636: Performed by: NURSE PRACTITIONER

## 2024-06-04 PROCEDURE — 999N000208 ECHOCARDIOGRAM COMPLETE

## 2024-06-04 PROCEDURE — 93306 TTE W/DOPPLER COMPLETE: CPT | Mod: 26 | Performed by: INTERNAL MEDICINE

## 2024-06-04 RX ADMIN — HUMAN ALBUMIN MICROSPHERES AND PERFLUTREN 3 ML: 10; .22 INJECTION, SOLUTION INTRAVENOUS at 15:53

## 2024-06-05 NOTE — PROGRESS NOTES
ONCOLOGY NOTE  Jun 6, 2024    Hoda Brush  Female, 69 year old, 1955  MRN: [id number]      Hoda is a 69 year old patient from [facility] and is seen here for continuation of care for her metastatic ER+HER2- breast cancer.  She is a refugee from [facility] and was initially seen in clinic with her daughter.  The only data we have from [facility] is an English translation of her records.       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  On 02/11/2014, she was seen by an oncologist.  The clinical staging of T4b N2 M1 was noted.   Her breast cancer was on the left side. There was no right breast cancer, confirmed by the patient and her daughter, correcting a possible error in the translated records.  ER was positive in 100% of the cells, PA at 14% and HER2 was 3+ positive.  It appears that this histopathologic information is on the mastectomy specimen. She underwent an MRI for staging of presumptive bone metastases which was performed 03/02/2014.  There were skeletal metastases in the thoracic vertebrae at 12, L1, L3, L5 and S1 vertebral body, ranging in size from 0.7-3.0 cm in size.  Ischial and right femur were also involved, as well as a large iliac mass measuring about 15 cm in the uterus. 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   Radiation Oncology consultation was performed 03/11/2014, and she was given radiation in 1 dose of 6 Gy to the right iliac lesion.        With the initial diagnosis, she initiated treatment with 6 cycles of CAF neoadjuvant chemotherapy 02/14, 07/07, 03/28, 04/18/14, 05/08 and 05/29/14. She also received monthly zoledronic acid.  She then underwent a modified left mastectomy of Palacios type and left lymphadenoectomy on 06/27/2014.   Pathologic examination showed number at [facility] was [id number] showed infiltrative grade 2 ductal cancer with 6 level 1 metastatic lympFollow up with Jossie amador  visits and trastuzumab on 2-5, 2-26, 3-19 with CBC, CMP.  Echocardiogram on 3-19.  Follow up with me 4-9 with CBC, CMP, CA27.29 and CEA and with CT CAP on 4-8.h nodes and 3 level 2 metastatic lymph nodes.  The differentiation was intermediate.  The tumor was ER positive 70% of the cells, NH positive in 40% of the cells and HER2 was 3+ by immunohistochemistry and the Ki-67 labeling index was 20%. Her staging after surgery was stage IV, pT4b N2 M1.  I don't see a biopsy of the skeletal metastases.  She then had continuation with chemotherapy with 4 cycles of Herceptin and taxane with monthly zoledronic acid.  Tamoxifen was initiated.  She then had two years of Herceptin and a decision was made not to continue further Herceptin.  She continued on zoledronic acid every 3 months. She was clinically stable. [place] then [place] to the [place] new [place] from [facility].  She has now been changed to letrozole.  Denosumab has now been held for new diagnosis of osteonecrosis of the R maxilla.     History of cholecystectomy 2020.      TREATMENT HISTORY:  A. Initial diagnosis with metastatic breast cancer in [facility].  Neoadjuvant CAF x 6.   B. Left mastectomy. Left axillary node dissection.  C.  Radiation in 1 dose to R iliac region.  C. Herceptin for 2 years taxane for a prescribed course then stopped, monthly zoledronic acid.  She had 2 years of Herceptin with tamoxifen added after chemotherapy.  D.  Tamoxifen alone and zoledronic acid every 3 months starting 2014.  Letrozole was started   E.  [place]  We restarted Herceptin every 3 weeks and continued tamoxifen. Bone targeted agent changed to denosumab every 9 weeks. Zometa discontinued 2017.  Tamoxifen was changed to letrozole August 2019.    F.  Xgeva discontinued 12-17-19 because of dental issues.   G.  J+J vaccine March, 2021.  H.  Was on Zometa once May 11.  Reaction with eye lid swelling. Discontinued Zometa.  H.  Restart Xgeva 6-22-21.  Continuing the  trastuzumab and letrozole.  Both tolerated well.   I.  Discontinue Xgeva because of osteonecrosis of the maxilla.   Continue trastuzumab and letrozole.       INTERVAL HISTORY  Hoda is seen in person. She is doing very well. She has no concerns. No chest pain, shortness of breath, or cough. No fevers or chills. No new pains in her body. She continues on trastuzumab, letrozole.  We have discontinued Xgeva because of osteonecrosis maxilla.      She has trips to the [email or web address] and to [place] this summer.      REVIEW OF SYSTEMS:  A 10-point review of systems is entirely negative.     She is a eating a Mediterranean-style diet.  She is exercising by swimming.  I did advise adding a weightbearing exercise.  She takes vitamin D3 2000 International Units per day and calcium.  Her last DEXA scan performed a week ago shows a most valid negative T-score of -2.5 in the left hip, consistent with osteoporosis.     Notably, she has had no bowel or bladder problems.     PHYSICAL EXAMINATION:    VITALS:  /72 (BP Location: Right arm, Patient Position: Sitting, Cuff Size: Adult Regular)   Pulse 83   Temp 98.1  F (36.7  C) (Oral)   Resp 16   Wt 81.5 kg (179 lb 9.6 oz)   SpO2 94%   BMI 33.05 kg/m    General: No acute distress  HEENT: Sclera anicteric. Oral mucosa pink and moist. Upper dentures in place. No mucositis or thrush  Lymph: No lymphadenopathy in neck  Heart: Regular, rate, and rhythm  Lungs: Clear to ascultation bilaterally  Abdomen: Positive bowel sounds. Soft, non-distended, non-tender. No organomegaly or mass.   Extremities: no lower extremity edema  Neuro: Cranial nerves grossly intact  Rash: none on exposed skin       LABORATORY DATA:   Most Recent 3 CBC's:  Recent Labs   Lab Test 06/06/24  1128 05/16/24  1306 04/25/24  0935   WBC 6.6 7.9 7.4   HGB 12.6 12.6 12.6   MCV 94 93 93    215 186   ANEUTAUTO 3.0 3.9 3.8     Most Recent 3 BMP's:  Recent Labs   Lab Test 06/06/24  1128 05/16/24  1306 04/25/24  0935   NA  142 141 142   POTASSIUM 4.0 4.3 4.2   CHLORIDE 105 105 105   CO2 27 27 27   BUN 14.4 11.9 14.1   CR 0.80 0.82 0.79   ANIONGAP 10 9 10   TAYLER 9.2 9.2 9.6   * 85 97   PROTTOTAL 7.5 7.6 7.6   ALBUMIN 4.0 4.1 4.1    Most Recent 3 LFT's:  Recent Labs   Lab Test 06/06/24  1128 05/16/24  1306 04/25/24  0935   AST 46* 40 39   ALT 46 40 42   ALKPHOS 49 52 49   BILITOTAL 0.3 0.3 0.3    Most Recent 2 TSH and T4:  Recent Labs   Lab Test 05/16/24  1307 07/06/23  1216   TSH 0.47 0.77     CEA, CA27.29 pending.    I reviewed the above labs today.      CT Chest/Abdomen/Pelvis 4/12/23  IMPRESSION:  1.  Stable appearance of sclerotic osseous lesions.  2.  Stable small pulmonary nodules.  3.  No new disease in the chest, abdomen and pelvis.        ASSESSMENT AND PLAN:       1.  Hoda Brush is a 69-year-old woman with a history of metastatic ER-positive, OR-positive, HER2 positive breast cancer.  She is from [facility] and came to live in the [place] with [place].   - She underwent neoadjuvant CAF, had a left mastectomy, left axillary lymph node dissection, radiation to the right hip, which included the right iliac region where she had metastatic disease.  She was initially on tamoxifen but then was switched to letrozole.  She continues on this and every 3-week trastuzumab.  She has had no evidence of disease progression for the last 7 years and possible longer but we don't have detailed data from [facility].  Markers are low and stable.  We have continued Herceptin every 3 weeks as well as daily letrozole. CT CAP shows stable pulmonary nodules.   - will continue CT CAP every 4 months. Next due 08/2024.  - 06/04/24 Echo with stable EF.     2. Bone metastasis  - Xgeva on hold due to history of osteonecrosis of the right maxilla. Repeat DEXA scan 2025.     3. Follow up.  Continue Herceptin through the end of 2025 alternating providers every 6 weeks. CT CAP every 4 months. Hoda is appropriate to see KARISSA virtual  and Dr. Aguiar visits in person.     20 minutes spent on the date of the encounter doing chart review, review of test results, interpretation of tests, patient visit, and documentation     Anya Cartwright PA-C

## 2024-06-06 ENCOUNTER — ONCOLOGY VISIT (OUTPATIENT)
Dept: ONCOLOGY | Facility: CLINIC | Age: 69
End: 2024-06-06
Payer: COMMERCIAL

## 2024-06-06 ENCOUNTER — APPOINTMENT (OUTPATIENT)
Dept: LAB | Facility: CLINIC | Age: 69
End: 2024-06-06
Payer: COMMERCIAL

## 2024-06-06 ENCOUNTER — INFUSION THERAPY VISIT (OUTPATIENT)
Dept: ONCOLOGY | Facility: CLINIC | Age: 69
End: 2024-06-06
Attending: NURSE PRACTITIONER
Payer: COMMERCIAL

## 2024-06-06 VITALS
RESPIRATION RATE: 16 BRPM | WEIGHT: 179.6 LBS | OXYGEN SATURATION: 94 % | BODY MASS INDEX: 33.05 KG/M2 | DIASTOLIC BLOOD PRESSURE: 72 MMHG | HEART RATE: 83 BPM | SYSTOLIC BLOOD PRESSURE: 114 MMHG | TEMPERATURE: 98.1 F

## 2024-06-06 DIAGNOSIS — C79.51 MALIGNANT NEOPLASM METASTATIC TO BONE (H): ICD-10-CM

## 2024-06-06 DIAGNOSIS — Z51.11 ENCOUNTER FOR ANTINEOPLASTIC CHEMOTHERAPY: ICD-10-CM

## 2024-06-06 DIAGNOSIS — C50.912 MALIGNANT NEOPLASM OF LEFT FEMALE BREAST, UNSPECIFIED ESTROGEN RECEPTOR STATUS, UNSPECIFIED SITE OF BREAST (H): Primary | ICD-10-CM

## 2024-06-06 LAB
ALBUMIN SERPL BCG-MCNC: 4 G/DL (ref 3.5–5.2)
ALP SERPL-CCNC: 49 U/L (ref 40–150)
ALT SERPL W P-5'-P-CCNC: 46 U/L (ref 0–50)
ANION GAP SERPL CALCULATED.3IONS-SCNC: 10 MMOL/L (ref 7–15)
AST SERPL W P-5'-P-CCNC: 46 U/L (ref 0–45)
BASOPHILS # BLD AUTO: 0 10E3/UL (ref 0–0.2)
BASOPHILS NFR BLD AUTO: 1 %
BILIRUB SERPL-MCNC: 0.3 MG/DL
BUN SERPL-MCNC: 14.4 MG/DL (ref 8–23)
CALCIUM SERPL-MCNC: 9.2 MG/DL (ref 8.8–10.2)
CEA SERPL-MCNC: 1.2 NG/ML
CHLORIDE SERPL-SCNC: 105 MMOL/L (ref 98–107)
CREAT SERPL-MCNC: 0.8 MG/DL (ref 0.51–0.95)
DEPRECATED HCO3 PLAS-SCNC: 27 MMOL/L (ref 22–29)
EGFRCR SERPLBLD CKD-EPI 2021: 80 ML/MIN/1.73M2
EOSINOPHIL # BLD AUTO: 0.3 10E3/UL (ref 0–0.7)
EOSINOPHIL NFR BLD AUTO: 4 %
ERYTHROCYTE [DISTWIDTH] IN BLOOD BY AUTOMATED COUNT: 13.8 % (ref 10–15)
GLUCOSE SERPL-MCNC: 107 MG/DL (ref 70–99)
HCT VFR BLD AUTO: 39.4 % (ref 35–47)
HGB BLD-MCNC: 12.6 G/DL (ref 11.7–15.7)
IMM GRANULOCYTES # BLD: 0 10E3/UL
IMM GRANULOCYTES NFR BLD: 0 %
LYMPHOCYTES # BLD AUTO: 2.9 10E3/UL (ref 0.8–5.3)
LYMPHOCYTES NFR BLD AUTO: 43 %
MCH RBC QN AUTO: 30 PG (ref 26.5–33)
MCHC RBC AUTO-ENTMCNC: 32 G/DL (ref 31.5–36.5)
MCV RBC AUTO: 94 FL (ref 78–100)
MONOCYTES # BLD AUTO: 0.5 10E3/UL (ref 0–1.3)
MONOCYTES NFR BLD AUTO: 7 %
NEUTROPHILS # BLD AUTO: 3 10E3/UL (ref 1.6–8.3)
NEUTROPHILS NFR BLD AUTO: 45 %
NRBC # BLD AUTO: 0 10E3/UL
NRBC BLD AUTO-RTO: 0 /100
PLATELET # BLD AUTO: 203 10E3/UL (ref 150–450)
POTASSIUM SERPL-SCNC: 4 MMOL/L (ref 3.4–5.3)
PROT SERPL-MCNC: 7.5 G/DL (ref 6.4–8.3)
RBC # BLD AUTO: 4.2 10E6/UL (ref 3.8–5.2)
SODIUM SERPL-SCNC: 142 MMOL/L (ref 135–145)
WBC # BLD AUTO: 6.6 10E3/UL (ref 4–11)

## 2024-06-06 PROCEDURE — 82378 CARCINOEMBRYONIC ANTIGEN: CPT | Performed by: INTERNAL MEDICINE

## 2024-06-06 PROCEDURE — G0463 HOSPITAL OUTPT CLINIC VISIT: HCPCS | Mod: 25

## 2024-06-06 PROCEDURE — 250N000011 HC RX IP 250 OP 636: Mod: JZ

## 2024-06-06 PROCEDURE — 85025 COMPLETE CBC W/AUTO DIFF WBC: CPT | Performed by: INTERNAL MEDICINE

## 2024-06-06 PROCEDURE — 258N000003 HC RX IP 258 OP 636: Mod: JZ | Performed by: INTERNAL MEDICINE

## 2024-06-06 PROCEDURE — 80053 COMPREHEN METABOLIC PANEL: CPT | Performed by: INTERNAL MEDICINE

## 2024-06-06 PROCEDURE — 86300 IMMUNOASSAY TUMOR CA 15-3: CPT | Performed by: INTERNAL MEDICINE

## 2024-06-06 PROCEDURE — 250N000011 HC RX IP 250 OP 636: Mod: JZ | Performed by: INTERNAL MEDICINE

## 2024-06-06 PROCEDURE — 96413 CHEMO IV INFUSION 1 HR: CPT

## 2024-06-06 PROCEDURE — 36591 DRAW BLOOD OFF VENOUS DEVICE: CPT | Performed by: INTERNAL MEDICINE

## 2024-06-06 PROCEDURE — 99215 OFFICE O/P EST HI 40 MIN: CPT

## 2024-06-06 RX ORDER — HEPARIN SODIUM (PORCINE) LOCK FLUSH IV SOLN 100 UNIT/ML 100 UNIT/ML
5 SOLUTION INTRAVENOUS EVERY 8 HOURS
Status: DISCONTINUED | OUTPATIENT
Start: 2024-06-06 | End: 2024-06-06 | Stop reason: HOSPADM

## 2024-06-06 RX ORDER — HEPARIN SODIUM (PORCINE) LOCK FLUSH IV SOLN 100 UNIT/ML 100 UNIT/ML
5 SOLUTION INTRAVENOUS ONCE
Status: COMPLETED | OUTPATIENT
Start: 2024-06-06 | End: 2024-06-06

## 2024-06-06 RX ADMIN — Medication 5 ML: at 13:24

## 2024-06-06 RX ADMIN — Medication 5 ML: at 11:31

## 2024-06-06 RX ADMIN — TRASTUZUMAB 450 MG: 150 INJECTION, POWDER, LYOPHILIZED, FOR SOLUTION INTRAVENOUS at 12:53

## 2024-06-06 RX ADMIN — SODIUM CHLORIDE 250 ML: 9 INJECTION, SOLUTION INTRAVENOUS at 12:22

## 2024-06-06 ASSESSMENT — PAIN SCALES - GENERAL: PAINLEVEL: NO PAIN (0)

## 2024-06-06 NOTE — PATIENT INSTRUCTIONS
[facility] Triage and after hours / weekends / holidays:  [phone]    Please call the triage or after hours line if you experience a temperature greater than or equal to 100.4, shaking chills, have uncontrolled nausea, vomiting and/or diarrhea, dizziness, shortness of breath, chest pain, bleeding, unexplained bruising, or if you have any other new/concerning symptoms, questions, or concerns.      If you are having any concerning symptoms or wish to speak to a provider before your next infusion visit, please call your care coordinator or triage to notify them so we can adequately serve you.     If you need a refill on a narcotic prescription or other medication, please call before your infusion appointment.

## 2024-06-06 NOTE — LETTER
6/6/2024      Hoda Brush  [address]  [place] [facility] [place] [place] [address]      Dear Colleague,    Thank you for referring your patient, Hoda Brush, to the [facility]. Please see a copy of my visit note below.    ONCOLOGY NOTE  Jun 6, 2024    Hoda Brsuh  Female, 69 year old, 1955  MRN: [id number]      Hoda is a 69 year old patient from [facility] and is seen here for continuation of care for her metastatic ER+HER2- breast cancer.  She is a refugee from [facility] and was initially seen in clinic with her daughter.  The only data we have from [facility] is an English translation of her records.       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  On 02/11/2014, she was seen by an oncologist.  The clinical staging of T4b N2 M1 was noted.   Her breast cancer was on the left side. There was no right breast cancer, confirmed by the patient and her daughter, correcting a possible error in the translated records.  ER was positive in 100% of the cells, AL at 14% and HER2 was 3+ positive.  It appears that this histopathologic information is on the mastectomy specimen. She underwent an MRI for staging of presumptive bone metastases which was performed 03/02/2014.  There were skeletal metastases in the thoracic vertebrae at 12, L1, L3, L5 and S1 vertebral body, ranging in size from 0.7-3.0 cm in size.  Ischial and right femur were also involved, as well as a large iliac mass measuring about 15 cm in the uterus. 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   Radiation Oncology consultation was performed 03/11/2014, and she was given radiation in 1 dose of 6 Gy to the right iliac lesion.        With the initial diagnosis, she initiated treatment with 6 cycles of CAF neoadjuvant chemotherapy 02/14, 07/07, 03/28, 04/18/14, 05/08 and 05/29/14. She also received monthly zoledronic acid.  She then underwent  a modified left mastectomy of Palacios type and left lymphadenoectomy on 06/27/2014.   Pathologic examination showed number at [facility] was [id number] showed infiltrative grade 2 ductal cancer with 6 level 1 metastatic lympFollow up with Jossie for visits and trastuzumab on 2-5, 2-26, 3-19 with CBC, CMP.  Echocardiogram on 3-19.  Follow up with me 4-9 with CBC, CMP, CA27.29 and CEA and with CT CAP on 4-8.h nodes and 3 level 2 metastatic lymph nodes.  The differentiation was intermediate.  The tumor was ER positive 70% of the cells, WI positive in 40% of the cells and HER2 was 3+ by immunohistochemistry and the Ki-67 labeling index was 20%. Her staging after surgery was stage IV, pT4b N2 M1.  I don't see a biopsy of the skeletal metastases.  She then had continuation with chemotherapy with 4 cycles of Herceptin and taxane with monthly zoledronic acid.  Tamoxifen was initiated.  She then had two years of Herceptin and a decision was made not to continue further Herceptin.  She continued on zoledronic acid every 3 months. She was clinically stable. [place] then [place] the [place] new [place] from [facility].  She has now been changed to letrozole.  Denosumab has now been held for new diagnosis of osteonecrosis of the R maxilla.     History of cholecystectomy 2020.      TREATMENT HISTORY:  A. Initial diagnosis with metastatic breast cancer in [facility].  Neoadjuvant CAF x 6.   B. Left mastectomy. Left axillary node dissection.  C.  Radiation in 1 dose to R iliac region.  C. Herceptin for 2 years taxane for a prescribed course then stopped, monthly zoledronic acid.  She had 2 years of Herceptin with tamoxifen added after chemotherapy.  D.  Tamoxifen alone and zoledronic acid every 3 months starting 2014.  Letrozole was started   E.  [place]  We restarted Herceptin every 3 weeks and continued tamoxifen. Bone targeted agent changed to denosumab every 9 weeks. Zometa discontinued 2017.  Tamoxifen was changed to  letrozole August 2019.    F.  Xgeva discontinued 12-17-19 because of dental issues.   G.  J+J vaccine March, 2021.  H.  Was on Zometa once May 11.  Reaction with eye lid swelling. Discontinued Zometa.  H.  Restart Xgeva 6-22-21.  Continuing the trastuzumab and letrozole.  Both tolerated well.   I.  Discontinue Xgeva because of osteonecrosis of the maxilla.   Continue trastuzumab and letrozole.       INTERVAL HISTORY  Hoda is seen in person. She is doing very well. She has no concerns. No chest pain, shortness of breath, or cough. No fevers or chills. No new pains in her body. She continues on trastuzumab, letrozole.  We have discontinued Xgeva because of osteonecrosis maxilla.      She has trips to the [employer] and to [place] this summer.      REVIEW OF SYSTEMS:  A 10-point review of systems is entirely negative.     She is a eating a Mediterranean-style diet.  She is exercising by swimming.  I did advise adding a weightbearing exercise.  She takes vitamin D3 2000 International Units per day and calcium.  Her last DEXA scan performed a week ago shows a most valid negative T-score of -2.5 in the left hip, consistent with osteoporosis.     Notably, she has had no bowel or bladder problems.     PHYSICAL EXAMINATION:    VITALS:  /72 (BP Location: Right arm, Patient Position: Sitting, Cuff Size: Adult Regular)   Pulse 83   Temp 98.1  F (36.7  C) (Oral)   Resp 16   Wt 81.5 kg (179 lb 9.6 oz)   SpO2 94%   BMI 33.05 kg/m    General: No acute distress  HEENT: Sclera anicteric. Oral mucosa pink and moist. Upper dentures in place. No mucositis or thrush  Lymph: No lymphadenopathy in neck  Heart: Regular, rate, and rhythm  Lungs: Clear to ascultation bilaterally  Abdomen: Positive bowel sounds. Soft, non-distended, non-tender. No organomegaly or mass.   Extremities: no lower extremity edema  Neuro: Cranial nerves grossly intact  Rash: none on exposed skin       LABORATORY DATA:   Most Recent 3 CBC's:  Recent Labs   Lab  Test 06/06/24  1128 05/16/24  1306 04/25/24  0935   WBC 6.6 7.9 7.4   HGB 12.6 12.6 12.6   MCV 94 93 93    215 186   ANEUTAUTO 3.0 3.9 3.8     Most Recent 3 BMP's:  Recent Labs   Lab Test 06/06/24  1128 05/16/24  1306 04/25/24  0935    141 142   POTASSIUM 4.0 4.3 4.2   CHLORIDE 105 105 105   CO2 27 27 27   BUN 14.4 11.9 14.1   CR 0.80 0.82 0.79   ANIONGAP 10 9 10   TAYLER 9.2 9.2 9.6   * 85 97   PROTTOTAL 7.5 7.6 7.6   ALBUMIN 4.0 4.1 4.1    Most Recent 3 LFT's:  Recent Labs   Lab Test 06/06/24  1128 05/16/24  1306 04/25/24  0935   AST 46* 40 39   ALT 46 40 42   ALKPHOS 49 52 49   BILITOTAL 0.3 0.3 0.3    Most Recent 2 TSH and T4:  Recent Labs   Lab Test 05/16/24  1307 07/06/23  1216   TSH 0.47 0.77     CEA, CA27.29 pending.    I reviewed the above labs today.      CT Chest/Abdomen/Pelvis 4/12/23  IMPRESSION:  1.  Stable appearance of sclerotic osseous lesions.  2.  Stable small pulmonary nodules.  3.  No new disease in the chest, abdomen and pelvis.        ASSESSMENT AND PLAN:       1.  Hoda Brush is a 69-year-old woman with a history of metastatic ER-positive, MO-positive, HER2 positive breast cancer.  She is from [facility] and came to live in the [place].   - She underwent neoadjuvant CAF, had a left mastectomy, left axillary lymph node dissection, radiation to the right hip, which included the right iliac region where she had metastatic disease.  She was initially on tamoxifen but then was switched to letrozole.  She continues on this and every 3-week trastuzumab.  She has had no evidence of disease progression for the last 7 years and possible longer but we don't have detailed data from [facility].  Markers are low and stable.  We have continued Herceptin every 3 weeks as well as daily letrozole. CT CAP shows stable pulmonary nodules.   - will continue CT CAP every 4 months. Next due 08/2024.  - 06/04/24 Echo with stable EF.     2. Bone metastasis  - Xgeva on  hold due to history of osteonecrosis of the right maxilla. Repeat DEXA scan 2025.     3. Follow up.  Continue Herceptin through the end of 2025 alternating providers every 6 weeks. CT CAP every 4 months. Hoda is appropriate to see KARISSA virtual and Dr. Aguiar visits in person.     20 minutes spent on the date of the encounter doing chart review, review of test results, interpretation of tests, patient visit, and documentation     Anya Cartwright PA-C

## 2024-06-06 NOTE — PROGRESS NOTES
Infusion Nursing Note:  Hoda Brush presents today for Cycle 115 Day 1 trastuzumab.    Patient seen by provider today: Yes: Anya Cartwright PA-C   present during visit today: Not Applicable.    Note: Hoda presents today feeling well. Denies pain or nausea/vomiting. Offers no concerns since visit with Anya Cartwright prior to infusion.      Intravenous Access:  Implanted Port.    Treatment Conditions:     Latest Reference Range & Units 06/06/24 11:28   Sodium 135 - 145 mmol/L 142   Potassium 3.4 - 5.3 mmol/L 4.0   Chloride 98 - 107 mmol/L 105   Carbon Dioxide (CO2) 22 - 29 mmol/L 27   Urea Nitrogen 8.0 - 23.0 mg/dL 14.4   Creatinine 0.51 - 0.95 mg/dL 0.80   GFR Estimate >60 mL/min/1.73m2 80   Calcium 8.8 - 10.2 mg/dL 9.2   Anion Gap 7 - 15 mmol/L 10   Albumin 3.5 - 5.2 g/dL 4.0   Protein Total 6.4 - 8.3 g/dL 7.5   Alkaline Phosphatase 40 - 150 U/L 49   ALT 0 - 50 U/L 46   AST 0 - 45 U/L 46 (H)   Bilirubin Total <=1.2 mg/dL 0.3   Glucose 70 - 99 mg/dL 107 (H)   WBC 4.0 - 11.0 10e3/uL 6.6   Hemoglobin 11.7 - 15.7 g/dL 12.6   Hematocrit 35.0 - 47.0 % 39.4   Platelet Count 150 - 450 10e3/uL 203   RBC Count 3.80 - 5.20 10e6/uL 4.20   MCV 78 - 100 fL 94   MCH 26.5 - 33.0 pg 30.0   MCHC 31.5 - 36.5 g/dL 32.0   RDW 10.0 - 15.0 % 13.8   % Neutrophils % 45   % Lymphocytes % 43   % Monocytes % 7   % Eosinophils % 4   % Basophils % 1   Absolute Basophils 0.0 - 0.2 10e3/uL 0.0   Absolute Eosinophils 0.0 - 0.7 10e3/uL 0.3   Absolute Immature Granulocytes <=0.4 10e3/uL 0.0   Absolute Lymphocytes 0.8 - 5.3 10e3/uL 2.9   Absolute Monocytes 0.0 - 1.3 10e3/uL 0.5   % Immature Granulocytes % 0   Absolute Neutrophils 1.6 - 8.3 10e3/uL 3.0   Absolute NRBCs 10e3/uL 0.0   NRBCs per 100 WBC <1 /100 0     Results reviewed, labs MET treatment parameters, ok to proceed with treatment.  ECHO/MUGA completed 06/04/24  EF 62%.      Post Infusion Assessment:  Patient tolerated infusion without incident.  Blood return noted pre  and post infusion.  Site patent and intact, free from redness, edema or discomfort.  No evidence of extravasations.  Access discontinued per protocol.       Discharge Plan:   Patient declined prescription refills.  Discharge instructions reviewed with: Patient.  Patient and/or family verbalized understanding of discharge instructions and all questions answered.  AVS to patient via [employer].  Patient will return 06/27 for next infusion appointment.   Patient discharged in stable condition accompanied by: self.  Departure Mode: Ambulatory.      Olivia Bains RN

## 2024-06-06 NOTE — NURSING NOTE
"Oncology Rooming Note    June 6, 2024 11:41 AM   Hoda Brush is a 69 year old female who presents for:    Chief Complaint   Patient presents with    Port Draw     Labs drawn via port by RN. VS taken.    Oncology Clinic Visit     Patient with malignant neoplasm of left female breast here for provider visit      Initial Vitals: /72 (BP Location: Right arm, Patient Position: Sitting, Cuff Size: Adult Regular)   Pulse 83   Temp 98.1  F (36.7  C) (Oral)   Resp 16   Wt 81.5 kg (179 lb 9.6 oz)   SpO2 94%   BMI 33.05 kg/m   Estimated body mass index is 33.05 kg/m  as calculated from the following:    Height as of 5/14/24: 1.57 m (5' 1.81\").    Weight as of this encounter: 81.5 kg (179 lb 9.6 oz). Body surface area is 1.89 meters squared.  No Pain (0) Comment: Data Unavailable   No LMP recorded. Patient is postmenopausal.  Allergies reviewed: Yes  Medications reviewed: Yes    Medications: Medication refills not needed today.  Pharmacy name entered into [facility]: [id number] [place], [place] - [address]    Frailty Screening:   Is the patient here for a new oncology consult visit in cancer care? 2. No      Clinical concerns:       Lyly Nevarez CMA              "

## 2024-06-07 LAB — CANCER AG27-29 SERPL-ACNC: 18.7 U/ML

## 2024-06-10 ENCOUNTER — PATIENT OUTREACH (OUTPATIENT)
Dept: GERIATRIC MEDICINE | Facility: CLINIC | Age: 69
End: 2024-06-10
Payer: COMMERCIAL

## 2024-06-10 NOTE — PROGRESS NOTES
"[facility] Care Coordination Contact    Per CC, mailed client an \"Unable to Contact\" letter.      Chey Hanley  Case Management Specialist  [facility]  [phone]    "

## 2024-06-10 NOTE — LETTER
Melissa 10, 2024    NATASHA LOUIS  [address]   [place] [address]    Dear Natasha:     I m your care coordinator. I ve been unable to reach you by phone. I am writing to ask you or your authorized representative to call me at [phone]. If you reach my voicemail, leave a message with your daytime phone number. Include a date and time that I can call you. If you are hearing impaired, call the [place] Relay at 050 or [phone] (cvvdqs-ya-flehlv relay service).    The reason I am trying to reach you is:     [x] To schedule an assessment  [] For your six (6)-month check-in  [] Other:      Please call me as soon as you receive this letter. I look forward to speaking with you.    Sincerely,    Lakesha Ventura RN, BSN, PHN  [phone]  [email or web address]            [id number] accepted  [id number]                                                                        B  (08/2022)

## 2024-06-13 ENCOUNTER — PATIENT OUTREACH (OUTPATIENT)
Dept: GERIATRIC MEDICINE | Facility: CLINIC | Age: 69
End: 2024-06-13
Payer: COMMERCIAL

## 2024-06-13 NOTE — PROGRESS NOTES
[facility] Care Coordination Contact    Completed 4 attempts to reach client with no response.  Member is officially unable to contact effective today.  Completed MMIS entry.  Completed health plan required [facility] POC.    Follow-up Plan: CC will attempt to reach member in six months.    This CC note routed to PCP, Laury Dee RN  [facility]  [phone]

## 2024-06-13 NOTE — Clinical Note
Hi Dr. Dee,  I am required to notify you that I was unable to reach Hoda for her annual assessment. She has not had any assessed needs in the past. Please let me know if you have any concerns or questions.  Thank you,  Lakesha Ventura RN [facility] [phone]

## 2024-06-27 ENCOUNTER — APPOINTMENT (OUTPATIENT)
Dept: LAB | Facility: CLINIC | Age: 69
End: 2024-06-27
Attending: INTERNAL MEDICINE
Payer: COMMERCIAL

## 2024-06-27 ENCOUNTER — INFUSION THERAPY VISIT (OUTPATIENT)
Dept: ONCOLOGY | Facility: CLINIC | Age: 69
End: 2024-06-27
Attending: INTERNAL MEDICINE
Payer: COMMERCIAL

## 2024-06-27 VITALS
BODY MASS INDEX: 33.09 KG/M2 | DIASTOLIC BLOOD PRESSURE: 63 MMHG | TEMPERATURE: 98.1 F | RESPIRATION RATE: 16 BRPM | SYSTOLIC BLOOD PRESSURE: 109 MMHG | OXYGEN SATURATION: 95 % | WEIGHT: 179.8 LBS | HEART RATE: 78 BPM

## 2024-06-27 DIAGNOSIS — C50.912 MALIGNANT NEOPLASM OF LEFT FEMALE BREAST, UNSPECIFIED ESTROGEN RECEPTOR STATUS, UNSPECIFIED SITE OF BREAST (H): Primary | ICD-10-CM

## 2024-06-27 LAB
ALBUMIN SERPL BCG-MCNC: 3.9 G/DL (ref 3.5–5.2)
ALP SERPL-CCNC: 50 U/L (ref 40–150)
ALT SERPL W P-5'-P-CCNC: 38 U/L (ref 0–50)
ANION GAP SERPL CALCULATED.3IONS-SCNC: 10 MMOL/L (ref 7–15)
AST SERPL W P-5'-P-CCNC: 43 U/L (ref 0–45)
BASOPHILS # BLD AUTO: 0 10E3/UL (ref 0–0.2)
BASOPHILS NFR BLD AUTO: 1 %
BILIRUB SERPL-MCNC: 0.3 MG/DL
BUN SERPL-MCNC: 12.2 MG/DL (ref 8–23)
CALCIUM SERPL-MCNC: 9.3 MG/DL (ref 8.8–10.2)
CEA SERPL-MCNC: 1.1 NG/ML
CHLORIDE SERPL-SCNC: 104 MMOL/L (ref 98–107)
CREAT SERPL-MCNC: 0.77 MG/DL (ref 0.51–0.95)
DEPRECATED HCO3 PLAS-SCNC: 27 MMOL/L (ref 22–29)
EGFRCR SERPLBLD CKD-EPI 2021: 84 ML/MIN/1.73M2
EOSINOPHIL # BLD AUTO: 0.2 10E3/UL (ref 0–0.7)
EOSINOPHIL NFR BLD AUTO: 3 %
ERYTHROCYTE [DISTWIDTH] IN BLOOD BY AUTOMATED COUNT: 13.7 % (ref 10–15)
GLUCOSE SERPL-MCNC: 93 MG/DL (ref 70–99)
HCT VFR BLD AUTO: 39.9 % (ref 35–47)
HGB BLD-MCNC: 12.7 G/DL (ref 11.7–15.7)
IMM GRANULOCYTES # BLD: 0 10E3/UL
IMM GRANULOCYTES NFR BLD: 0 %
LYMPHOCYTES # BLD AUTO: 2.8 10E3/UL (ref 0.8–5.3)
LYMPHOCYTES NFR BLD AUTO: 43 %
MCH RBC QN AUTO: 29.6 PG (ref 26.5–33)
MCHC RBC AUTO-ENTMCNC: 31.8 G/DL (ref 31.5–36.5)
MCV RBC AUTO: 93 FL (ref 78–100)
MONOCYTES # BLD AUTO: 0.5 10E3/UL (ref 0–1.3)
MONOCYTES NFR BLD AUTO: 7 %
NEUTROPHILS # BLD AUTO: 3.1 10E3/UL (ref 1.6–8.3)
NEUTROPHILS NFR BLD AUTO: 46 %
NRBC # BLD AUTO: 0 10E3/UL
NRBC BLD AUTO-RTO: 0 /100
PLATELET # BLD AUTO: 210 10E3/UL (ref 150–450)
POTASSIUM SERPL-SCNC: 4.4 MMOL/L (ref 3.4–5.3)
PROT SERPL-MCNC: 7.3 G/DL (ref 6.4–8.3)
RBC # BLD AUTO: 4.29 10E6/UL (ref 3.8–5.2)
SODIUM SERPL-SCNC: 141 MMOL/L (ref 135–145)
WBC # BLD AUTO: 6.6 10E3/UL (ref 4–11)

## 2024-06-27 PROCEDURE — 86300 IMMUNOASSAY TUMOR CA 15-3: CPT | Performed by: INTERNAL MEDICINE

## 2024-06-27 PROCEDURE — 258N000003 HC RX IP 258 OP 636: Performed by: INTERNAL MEDICINE

## 2024-06-27 PROCEDURE — 36591 DRAW BLOOD OFF VENOUS DEVICE: CPT | Performed by: INTERNAL MEDICINE

## 2024-06-27 PROCEDURE — 82378 CARCINOEMBRYONIC ANTIGEN: CPT | Performed by: INTERNAL MEDICINE

## 2024-06-27 PROCEDURE — 250N000011 HC RX IP 250 OP 636: Mod: JZ | Performed by: INTERNAL MEDICINE

## 2024-06-27 PROCEDURE — 85025 COMPLETE CBC W/AUTO DIFF WBC: CPT | Performed by: INTERNAL MEDICINE

## 2024-06-27 PROCEDURE — 80053 COMPREHEN METABOLIC PANEL: CPT | Performed by: INTERNAL MEDICINE

## 2024-06-27 PROCEDURE — 96413 CHEMO IV INFUSION 1 HR: CPT

## 2024-06-27 RX ORDER — HEPARIN SODIUM (PORCINE) LOCK FLUSH IV SOLN 100 UNIT/ML 100 UNIT/ML
5 SOLUTION INTRAVENOUS EVERY 8 HOURS
Status: DISCONTINUED | OUTPATIENT
Start: 2024-06-27 | End: 2024-06-27 | Stop reason: HOSPADM

## 2024-06-27 RX ADMIN — Medication 5 ML: at 12:09

## 2024-06-27 RX ADMIN — SODIUM CHLORIDE 250 ML: 9 INJECTION, SOLUTION INTRAVENOUS at 11:32

## 2024-06-27 RX ADMIN — Medication 5 ML: at 10:05

## 2024-06-27 RX ADMIN — TRASTUZUMAB 450 MG: 150 INJECTION, POWDER, LYOPHILIZED, FOR SOLUTION INTRAVENOUS at 11:34

## 2024-06-27 ASSESSMENT — PAIN SCALES - GENERAL: PAINLEVEL: NO PAIN (0)

## 2024-06-27 NOTE — NURSING NOTE
Chief Complaint   Patient presents with    Chemotherapy     Cycle 116 Day 1 trastuzumab    Port Draw     Power needle. Heparin locked, vitals checked     Pia Schneider RN on 6/27/2024 at 10:07 AM

## 2024-06-27 NOTE — PATIENT INSTRUCTIONS
Contact Numbers  [facility]: [phone] (for symptom and scheduling needs)    Please call the [facility] Triage line if you experience a temperature greater than or equal to 100.4, shaking chills, have uncontrolled nausea, vomiting and/or diarrhea, dizziness, shortness of breath, chest pain, bleeding, unexplained bruising, or if you have any other new/concerning symptoms, questions or concerns.     If you are having any concerning symptoms or wish to speak to a provider before your next infusion visit, please call your care triage to notify them so we can adequately serve you.     If you need a refill on a narcotic prescription or other medication, please call triage before your infusion appointment.

## 2024-06-27 NOTE — PROGRESS NOTES
Infusion Nursing Note:  Hoda Brush presents today for Cycle 116 Day 1 Herceptin.    Patient seen by provider today: No   present during visit today: Not Applicable.    Note:   Patient arrives to infusion feeling well today.  She denies signs and symptoms of infection including:fever, cough, shortness of breath, sore throat, diarrhea, vomiting, rash, or pain with urination.     Intravenous Access:  Implanted Port.    Treatment Conditions:   Latest Reference Range & Units 06/27/24 10:04   Sodium 135 - 145 mmol/L 141   Potassium 3.4 - 5.3 mmol/L 4.4   Chloride 98 - 107 mmol/L 104   Carbon Dioxide (CO2) 22 - 29 mmol/L 27   Urea Nitrogen 8.0 - 23.0 mg/dL 12.2   Creatinine 0.51 - 0.95 mg/dL 0.77   GFR Estimate >60 mL/min/1.73m2 84   Calcium 8.8 - 10.2 mg/dL 9.3   Anion Gap 7 - 15 mmol/L 10   Albumin 3.5 - 5.2 g/dL 3.9   Protein Total 6.4 - 8.3 g/dL 7.3   Alkaline Phosphatase 40 - 150 U/L 50   ALT 0 - 50 U/L 38   AST 0 - 45 U/L 43   Bilirubin Total <=1.2 mg/dL 0.3   Glucose 70 - 99 mg/dL 93   WBC 4.0 - 11.0 10e3/uL 6.6   Hemoglobin 11.7 - 15.7 g/dL 12.7   Hematocrit 35.0 - 47.0 % 39.9   Platelet Count 150 - 450 10e3/uL 210   RBC Count 3.80 - 5.20 10e6/uL 4.29   MCV 78 - 100 fL 93   MCH 26.5 - 33.0 pg 29.6   MCHC 31.5 - 36.5 g/dL 31.8   RDW 10.0 - 15.0 % 13.7   % Neutrophils % 46   % Lymphocytes % 43   % Monocytes % 7   % Eosinophils % 3   % Basophils % 1   Absolute Basophils 0.0 - 0.2 10e3/uL 0.0   Absolute Eosinophils 0.0 - 0.7 10e3/uL 0.2   Absolute Immature Granulocytes <=0.4 10e3/uL 0.0   Absolute Lymphocytes 0.8 - 5.3 10e3/uL 2.8   Absolute Monocytes 0.0 - 1.3 10e3/uL 0.5   % Immature Granulocytes % 0   Absolute Neutrophils 1.6 - 8.3 10e3/uL 3.1   Absolute NRBCs 10e3/uL 0.0   NRBCs per 100 WBC <1 /100 0     Results reviewed, labs MET treatment parameters, ok to proceed with treatment.  ECHO/MUGA completed 6/4/24  EF 62%.      Post Infusion Assessment:  Patient tolerated infusion without  incident.  Blood return noted pre and post infusion.  Site patent and intact, free from redness, edema or discomfort.  No evidence of extravasations.  Access discontinued per protocol.     Discharge Plan:   Patient declined prescription refills.  Discharge instructions reviewed with: Patient.  Patient and/or family verbalized understanding of discharge instructions and all questions answered.  AVS to patient via [employer].  Patient will return 7/18/24 for next appointment.   Patient discharged in stable condition accompanied by: self.  Departure Mode: Ambulatory.      Shannan Stewart RN

## 2024-06-29 LAB — CANCER AG27-29 SERPL-ACNC: <9 U/ML

## 2024-07-18 ENCOUNTER — INFUSION THERAPY VISIT (OUTPATIENT)
Dept: ONCOLOGY | Facility: CLINIC | Age: 69
End: 2024-07-18
Attending: NURSE PRACTITIONER
Payer: COMMERCIAL

## 2024-07-18 ENCOUNTER — APPOINTMENT (OUTPATIENT)
Dept: LAB | Facility: CLINIC | Age: 69
End: 2024-07-18
Attending: NURSE PRACTITIONER
Payer: COMMERCIAL

## 2024-07-18 VITALS
BODY MASS INDEX: 24.65 KG/M2 | HEIGHT: 72 IN | WEIGHT: 182 LBS | DIASTOLIC BLOOD PRESSURE: 63 MMHG | SYSTOLIC BLOOD PRESSURE: 109 MMHG

## 2024-07-18 VITALS
DIASTOLIC BLOOD PRESSURE: 74 MMHG | WEIGHT: 180.4 LBS | HEART RATE: 77 BPM | BODY MASS INDEX: 24.47 KG/M2 | SYSTOLIC BLOOD PRESSURE: 115 MMHG | OXYGEN SATURATION: 96 % | TEMPERATURE: 98.2 F | RESPIRATION RATE: 16 BRPM

## 2024-07-18 DIAGNOSIS — C50.912 MALIGNANT NEOPLASM OF LEFT FEMALE BREAST, UNSPECIFIED ESTROGEN RECEPTOR STATUS, UNSPECIFIED SITE OF BREAST (H): ICD-10-CM

## 2024-07-18 DIAGNOSIS — K21.9 GASTROESOPHAGEAL REFLUX DISEASE WITHOUT ESOPHAGITIS: ICD-10-CM

## 2024-07-18 DIAGNOSIS — C50.912 MALIGNANT NEOPLASM OF LEFT FEMALE BREAST, UNSPECIFIED ESTROGEN RECEPTOR STATUS, UNSPECIFIED SITE OF BREAST (H): Primary | ICD-10-CM

## 2024-07-18 LAB
ALBUMIN SERPL BCG-MCNC: 4 G/DL (ref 3.5–5.2)
ALP SERPL-CCNC: 52 U/L (ref 40–150)
ALT SERPL W P-5'-P-CCNC: 55 U/L (ref 0–50)
ANION GAP SERPL CALCULATED.3IONS-SCNC: 8 MMOL/L (ref 7–15)
AST SERPL W P-5'-P-CCNC: 52 U/L (ref 0–45)
BASOPHILS # BLD AUTO: 0 10E3/UL (ref 0–0.2)
BASOPHILS NFR BLD AUTO: 0 %
BILIRUB SERPL-MCNC: 0.2 MG/DL
BUN SERPL-MCNC: 13.7 MG/DL (ref 8–23)
CALCIUM SERPL-MCNC: 8.9 MG/DL (ref 8.8–10.4)
CEA SERPL-MCNC: 1 NG/ML
CHLORIDE SERPL-SCNC: 107 MMOL/L (ref 98–107)
CREAT SERPL-MCNC: 0.74 MG/DL (ref 0.51–0.95)
EGFRCR SERPLBLD CKD-EPI 2021: 88 ML/MIN/1.73M2
EOSINOPHIL # BLD AUTO: 0.2 10E3/UL (ref 0–0.7)
EOSINOPHIL NFR BLD AUTO: 3 %
ERYTHROCYTE [DISTWIDTH] IN BLOOD BY AUTOMATED COUNT: 13.6 % (ref 10–15)
GLUCOSE SERPL-MCNC: 103 MG/DL (ref 70–99)
HCO3 SERPL-SCNC: 28 MMOL/L (ref 22–29)
HCT VFR BLD AUTO: 38.1 % (ref 35–47)
HGB BLD-MCNC: 12.1 G/DL (ref 11.7–15.7)
IMM GRANULOCYTES # BLD: 0 10E3/UL
IMM GRANULOCYTES NFR BLD: 0 %
LYMPHOCYTES # BLD AUTO: 2.8 10E3/UL (ref 0.8–5.3)
LYMPHOCYTES NFR BLD AUTO: 37 %
MCH RBC QN AUTO: 29.7 PG (ref 26.5–33)
MCHC RBC AUTO-ENTMCNC: 31.8 G/DL (ref 31.5–36.5)
MCV RBC AUTO: 94 FL (ref 78–100)
MONOCYTES # BLD AUTO: 0.5 10E3/UL (ref 0–1.3)
MONOCYTES NFR BLD AUTO: 7 %
NEUTROPHILS # BLD AUTO: 3.8 10E3/UL (ref 1.6–8.3)
NEUTROPHILS NFR BLD AUTO: 53 %
NRBC # BLD AUTO: 0 10E3/UL
NRBC BLD AUTO-RTO: 0 /100
PLATELET # BLD AUTO: 172 10E3/UL (ref 150–450)
POTASSIUM SERPL-SCNC: 3.9 MMOL/L (ref 3.4–5.3)
PROT SERPL-MCNC: 7.3 G/DL (ref 6.4–8.3)
RBC # BLD AUTO: 4.07 10E6/UL (ref 3.8–5.2)
SODIUM SERPL-SCNC: 143 MMOL/L (ref 135–145)
WBC # BLD AUTO: 7.4 10E3/UL (ref 4–11)

## 2024-07-18 PROCEDURE — 80053 COMPREHEN METABOLIC PANEL: CPT | Performed by: INTERNAL MEDICINE

## 2024-07-18 PROCEDURE — 250N000011 HC RX IP 250 OP 636: Performed by: NURSE PRACTITIONER

## 2024-07-18 PROCEDURE — 99215 OFFICE O/P EST HI 40 MIN: CPT | Mod: 95 | Performed by: NURSE PRACTITIONER

## 2024-07-18 PROCEDURE — 96413 CHEMO IV INFUSION 1 HR: CPT

## 2024-07-18 PROCEDURE — 250N000011 HC RX IP 250 OP 636: Performed by: INTERNAL MEDICINE

## 2024-07-18 PROCEDURE — 86300 IMMUNOASSAY TUMOR CA 15-3: CPT | Performed by: INTERNAL MEDICINE

## 2024-07-18 PROCEDURE — 82378 CARCINOEMBRYONIC ANTIGEN: CPT | Performed by: INTERNAL MEDICINE

## 2024-07-18 PROCEDURE — G2211 COMPLEX E/M VISIT ADD ON: HCPCS | Mod: 95 | Performed by: NURSE PRACTITIONER

## 2024-07-18 PROCEDURE — 36591 DRAW BLOOD OFF VENOUS DEVICE: CPT | Performed by: INTERNAL MEDICINE

## 2024-07-18 PROCEDURE — 85041 AUTOMATED RBC COUNT: CPT | Performed by: INTERNAL MEDICINE

## 2024-07-18 PROCEDURE — 258N000003 HC RX IP 258 OP 636: Performed by: INTERNAL MEDICINE

## 2024-07-18 RX ORDER — FAMOTIDINE 20 MG/1
20 TABLET, FILM COATED ORAL 2 TIMES DAILY
Qty: 180 TABLET | Refills: 3 | Status: SHIPPED | OUTPATIENT
Start: 2024-07-18

## 2024-07-18 RX ORDER — LETROZOLE 2.5 MG/1
2.5 TABLET, FILM COATED ORAL DAILY
Qty: 90 TABLET | Refills: 3 | Status: SHIPPED | OUTPATIENT
Start: 2024-07-18

## 2024-07-18 RX ORDER — HEPARIN SODIUM (PORCINE) LOCK FLUSH IV SOLN 100 UNIT/ML 100 UNIT/ML
5 SOLUTION INTRAVENOUS EVERY 8 HOURS
Status: DISCONTINUED | OUTPATIENT
Start: 2024-07-18 | End: 2024-07-18 | Stop reason: HOSPADM

## 2024-07-18 RX ORDER — HEPARIN SODIUM (PORCINE) LOCK FLUSH IV SOLN 100 UNIT/ML 100 UNIT/ML
500 SOLUTION INTRAVENOUS ONCE
Status: COMPLETED | OUTPATIENT
Start: 2024-07-18 | End: 2024-07-18

## 2024-07-18 RX ADMIN — Medication 500 UNITS: at 13:13

## 2024-07-18 RX ADMIN — TRASTUZUMAB 450 MG: 150 INJECTION, POWDER, LYOPHILIZED, FOR SOLUTION INTRAVENOUS at 14:01

## 2024-07-18 RX ADMIN — HEPARIN 5 ML: 100 SYRINGE at 14:32

## 2024-07-18 ASSESSMENT — PAIN SCALES - GENERAL
PAINLEVEL: NO PAIN (0)
PAINLEVEL: NO PAIN (0)

## 2024-07-18 NOTE — NURSING NOTE
Current patient location: [address] [place], [place] [address]   [facility] [address]    Is the patient currently in the state of [place]? YES    Visit mode:VIDEO    If the visit is dropped, the patient can be reconnected by: VIDEO VISIT: Text to cell phone:   Telephone Information:   Mobile [phone]       Will anyone else be joining the visit? NO  (If patient encounters technical issues they should call [phone]    How would you like to obtain your AVS? MyChart    Are changes needed to the allergy or medication list? No    Are refills needed on medications prescribed by this physician? YES pt would like a refill letrozole    Reason for visit: [place] VVF     Name (if in person): jennifer   ID # (if video/phone): [id number]  Language: Citizen of [place]  Agency: [employer]  Phone Number: N/A  Method of Interpretation: Video  Patient agrees to use  Services: No

## 2024-07-18 NOTE — LETTER
7/18/2024      Hoda Brush  [address]  [facility] [address]      Dear Colleague,    Thank you for referring your patient, Hoda Brush, to the [facility]. Please see a copy of my visit note below.    Jul 18, 2024    ONCOLOGY RETURN VISIT:     Hoda is a patient from [facility] and is seen here for continuation of care for her metastatic ER+HER2- breast cancer.  She is a refugee from [facility] and was initially seen in clinic with her daughter.  The only data we have from [facility] is an English translation of her records.       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  On 02/11/2014, she was seen by an oncologist.  The clinical staging of T4b N2 M1 was noted.   Her breast cancer was on the left side. There was no right breast cancer, confirmed by the patient and her daughter, correcting a possible error in the translated records.  ER was positive in 100% of the cells, ME at 14% and HER2 was 3+ positive.  It appears that this histopathologic information is on the mastectomy specimen. She underwent an MRI for staging of presumptive bone metastases which was performed 03/02/2014.  There were skeletal metastases in the thoracic vertebrae at 12, L1, L3, L5 and S1 vertebral body, ranging in size from 0.7-3.0 cm in size.  Ischial and right femur were also involved, as well as a large iliac mass measuring about 15 cm in the uterus. 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   Radiation Oncology consultation was performed 03/11/2014, and she was given radiation in 1 dose of 6 Gy to the right iliac lesion.        With the initial diagnosis, she initiated treatment with 6 cycles of CAF neoadjuvant chemotherapy 02/14, 07/07, 03/28, 04/18/14, 05/08 and 05/29/14. She also received monthly zoledronic acid.  She then underwent a modified left mastectomy of Palacios type and left lymphadenoectomy on  06/27/2014.   Pathologic examination showed number at [facility] was [id number] showed infiltrative grade 2 ductal cancer with 6 level 1 metastatic lymph nodes and 3 level 2 metastatic lymph nodes.  The differentiation was intermediate.  The tumor was ER positive 70% of the cells, UT positive in 40% of the cells and HER2 was 3+ by immunohistochemistry and the Ki-67 labeling index was 20%. Her staging after surgery was stage IV, pT4b N2 M1.  I don't see a biopsy of the skeletal metastases.  She then had continuation with chemotherapy with 4 cycles of Herceptin and taxane with monthly zoledronic acid.  Tamoxifen was initiated.  She then had two years of Herceptin and a decision was made not to continue further Herceptin.  She continued on zoledronic acid every 3 months. She was clinically stable. She then moved to the [place] new refugee from [facility].  She has now been changed to letrozole.  Denosumab was held for new diagnosis of osteonecrosis.     History of cholecystectomy 2020.      TREATMENT HISTORY:  A. Initial diagnosis with metastatic breast cancer in [facility].  Neoadjuvant CAF x 6.   B. Left mastectomy. Left axillary node dissection.  C. Radiation in 1 dose to R iliac region.  C. Herceptin for 2 years taxane for a prescribed course then stopped, monthly zoledronic acid.  She had 2 years of Herceptin with tamoxifen added after chemotherapy.  D. Tamoxifen alone and zoledronic acid every 3 months starting 2014.  Letrozole was started   [place]  We restarted Herceptin every 3 weeks and continued tamoxifen. Bone targeted agent changed to denosumab every 9 weeks. Zometa discontinued 2017.  Tamoxifen was changed to letrozole August 2019.    F. Xgeva discontinued 12-17-19 because of dental issues.   G. J+J vaccine March, 2021.  H. Was on Zometa once May 11.  Reaction with eye lid swelling. Discontinued Zometa.  H. Restart Xgeva 6-22-21.  Continuing the trastuzumab and letrozole.  Both tolerated well.    I. Xgeva on hold as of April 2024.       INTERVAL HISTORY:  Hoda is seen via video today for evaluation and toxicity check while on cancer-directed therapy with Herceptin and letrozole.   -Today, she reports she is feeling good.  -She continues on trastuzumab and letrozole and is tolerating these well.    -Her toe is better.   -Energy level has been normal.    -She is going to [place] tomorrow with her daughter and family and is looking forward to this!  -She otherwise denies any new aches or bony pain.    -She denies fevers or chills or signs of systemic illness.    -She denies drenching night sweats or unexplained weight loss.    -She denies headache, dizziness or vision changes.    -She denies cough, chest pain, or shortness of breath at rest.    -She denies nausea, vomiting, abdominal pain or bowel changes.    -She has no new dental concerns today.  Xgeva is on hold.    ROS: 12 point ROS neg other than the symptoms noted above in the HPI.    PHYSICAL EXAMINATION:    Video physical exam  General: Patient appears well in no acute distress.   Skin: No visualized rash or lesions on visualized skin  Eyes: EOMI, no erythema, sclera icterus or discharge noted  Resp: Appears to be breathing comfortably without accessory muscle usage, speaking in full sentences, no cough  MSK: Appears to have normal range of motion based on visualized movements  Neurologic: No apparent tremors, facial movements symmetric  Psych: affect bright, alert and oriented       LABORATORY DATA:   Most Recent 3 CBC's:  Recent Labs   Lab Test 06/27/24  1004 06/06/24  1128 05/16/24  1306   WBC 6.6 6.6 7.9   HGB 12.7 12.6 12.6   MCV 93 94 93    203 215   ANEUTAUTO 3.1 3.0 3.9     Most Recent 3 BMP's:  Recent Labs   Lab Test 06/27/24  1004 06/06/24  1128 05/16/24  1306    142 141   POTASSIUM 4.4 4.0 4.3   CHLORIDE 104 105 105   CO2 27 27 27   BUN 12.2 14.4 11.9   CR 0.77 0.80 0.82   ANIONGAP 10 10 9   TAYLER 9.3 9.2 9.2   GLC 93 107* 85    PROTTOTAL 7.3 7.5 7.6   ALBUMIN 3.9 4.0 4.1    Most Recent 3 LFT's:  Recent Labs   Lab Test 06/27/24  1004 06/06/24  1128 05/16/24  1306   AST 43 46* 40   ALT 38 46 40   ALKPHOS 50 49 52   BILITOTAL 0.3 0.3 0.3    Most Recent 2 TSH and T4:  Recent Labs   Lab Test 05/16/24  1307 07/06/23  1216   TSH 0.47 0.77   I reviewed the labs above.  Labs for today to be drawn at her infusion visit.    IMAGING:  Echo 6/4/2024:  Interpretation Summary     Left ventricular systolic function is normal.  Biplane LVEF is 62%.  Left ventricular diastolic function is normal.  The right ventricle is normal in size and function.  No valve disease.     Compared to prior study, there is no significant change.     ASSESSMENT AND PLAN:       Hoda Brush is a 68-year-old woman with a history of metastatic ER-positive, VT-positive, HER2 positive breast cancer to the bones.  She is from [facility], formally from [facility], and came to live in the [place] with [place].   She had metastatic disease at the time of presentation with bone-only metastases by report.  She underwent neoadjuvant CAF, had a left mastectomy, left axillary lymph node dissection, radiation to the right hip, which included the right iliac region where she had metastatic disease.  She was initially on tamoxifen but then was switched to letrozole.  She continues on this and every 3-week trastuzumab.  She has had no evidence of disease progression for the last 3 years.  Markers are low and stable, pending today.  We have continued Herceptin every 3 weeks as well as daily letrozole. CT CAP shows stable pulmonary nodules and stable sclerotic bone metastasis.   - Clinically appropriate to continue herceptin and letrozole.  Labs pending later today.    - 6/2024 echo overall stable with EF of 62%, no significant change from last study.   - Continue CT CAP every 4 months, scheduled for August 2024.  She has follow-up with Dr. Aguiar at that time.     Bone Metastasis   -  Hx of R maxillary osteonecrosis has healed, but Dr. Aguiar placed Xgeva on hold for now.  She is due for repeat DEXA ~July 2025.  - No report of pain today.     ALT/AST elevation  - AST and ALT mildly elevated in the past.  Labs pending today.   - Per chart review she has had intermittent elevations over the last several years.   - Most recent CT with no concerning findings; does have hepatic steatosis and is s/p cholecystectomy in 2020.   - Continue to closely monitor.    Follow up   - Continue herceptin and labs every three weeks with provider visits every 3-6 weeks.  She is scheduled to see Dr. Aguiar post-CT in August.     KATHI Valencia, CNP  [facility] [place] [address]  [phone]    24 minutes spent on the date of the encounter doing chart review, review of test results, interpretation of tests, patient visit, and documentation. The longitudinal plan of care for the diagnosis(es)/condition(s) as documented were addressed during this visit. Due to the added complexity in care, I will continue to support Hoda in the subsequent management and with ongoing continuity of care.           Virtual Visit Details    Type of service:  Video Visit   Video Start Time:  8:30 AM  Video End Time:8:41 AM    Originating Location (pt. Location): Home  Distant Location (provider location):  Off-site  Platform used for Video Visit: Nidia      Again, thank you for allowing me to participate in the care of your patient.        Sincerely,        KATHI Mendoza CNP

## 2024-07-18 NOTE — PROGRESS NOTES
Virtual Visit Details    Type of service:  Video Visit   Video Start Time:  8:30 AM  Video End Time:8:41 AM    Originating Location (pt. Location): Home  Distant Location (provider location):  Off-site  Platform used for Video Visit: Well

## 2024-07-18 NOTE — PROGRESS NOTES
Jul 18, 2024    ONCOLOGY RETURN VISIT:     Hoda is a patient from [facility] and is seen here for continuation of care for her metastatic ER+HER2- breast cancer.  She is a refugee from [facility] and was initially seen in clinic with her daughter.  The only data we have from [facility] is an English translation of her records.       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  On 02/11/2014, she was seen by an oncologist.  The clinical staging of T4b N2 M1 was noted.   Her breast cancer was on the left side. There was no right breast cancer, confirmed by the patient and her daughter, correcting a possible error in the translated records.  ER was positive in 100% of the cells, PA at 14% and HER2 was 3+ positive.  It appears that this histopathologic information is on the mastectomy specimen. She underwent an MRI for staging of presumptive bone metastases which was performed 03/02/2014.  There were skeletal metastases in the thoracic vertebrae at 12, L1, L3, L5 and S1 vertebral body, ranging in size from 0.7-3.0 cm in size.  Ischial and right femur were also involved, as well as a large iliac mass measuring about 15 cm in the uterus. 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   Radiation Oncology consultation was performed 03/11/2014, and she was given radiation in 1 dose of 6 Gy to the right iliac lesion.        With the initial diagnosis, she initiated treatment with 6 cycles of CAF neoadjuvant chemotherapy 02/14, 07/07, 03/28, 04/18/14, 05/08 and 05/29/14. She also received monthly zoledronic acid.  She then underwent a modified left mastectomy of Palacios type and left lymphadenoectomy on 06/27/2014.   Pathologic examination showed number at [facility] was [id number] showed infiltrative grade 2 ductal cancer with 6 level 1 metastatic lymph nodes and 3 level 2 metastatic lymph nodes.  The differentiation was intermediate.  The tumor was  ER positive 70% of the cells, TN positive in 40% of the cells and HER2 was 3+ by immunohistochemistry and the Ki-67 labeling index was 20%. Her staging after surgery was stage IV, pT4b N2 M1.  I don't see a biopsy of the skeletal metastases.  She then had continuation with chemotherapy with 4 cycles of Herceptin and taxane with monthly zoledronic acid.  Tamoxifen was initiated.  She then had two years of Herceptin and a decision was made not to continue further Herceptin.  She continued on zoledronic acid every 3 months. She was clinically stable. She then moved [place] the [place] new refugee from [facility].  She has now been changed to letrozole.  Denosumab was held for new diagnosis of osteonecrosis.     History of cholecystectomy 2020.      TREATMENT HISTORY:  A. Initial diagnosis with metastatic breast cancer in [facility].  Neoadjuvant CAF x 6.   B. Left mastectomy. Left axillary node dissection.  C. Radiation in 1 dose to R iliac region.  C. Herceptin for 2 years taxane for a prescribed course then stopped, monthly zoledronic acid.  She had 2 years of Herceptin with tamoxifen added after chemotherapy.  D. Tamoxifen alone and zoledronic acid every 3 months starting 2014.  Letrozole was started   [place]  We restarted Herceptin every 3 weeks and continued tamoxifen. Bone targeted agent changed to denosumab every 9 weeks. Zometa discontinued 2017.  Tamoxifen was changed to letrozole August 2019.    F. Xgeva discontinued 12-17-19 because of dental issues.   G. J+J vaccine March, 2021.  H. Was on Zometa once May 11.  Reaction with eye lid swelling. Discontinued Zometa.  H. Restart Xgeva 6-22-21.  Continuing the trastuzumab and letrozole.  Both tolerated well.   I. Xgeva on hold as of April 2024.       INTERVAL HISTORY:  Hoda is seen via video today for evaluation and toxicity check while on cancer-directed therapy with Herceptin and letrozole.   -Today, she reports she is feeling good.  -She continues on  trastuzumab and letrozole and is tolerating these well.    -Her toe is better.   -Energy level has been normal.    -She is going to [place] tomorrow with her daughter and family and is looking forward to this!  -She otherwise denies any new aches or bony pain.    -She denies fevers or chills or signs of systemic illness.    -She denies drenching night sweats or unexplained weight loss.    -She denies headache, dizziness or vision changes.    -She denies cough, chest pain, or shortness of breath at rest.    -She denies nausea, vomiting, abdominal pain or bowel changes.    -She has no new dental concerns today.  Xgeva is on hold.    ROS: 12 point ROS neg other than the symptoms noted above in the HPI.    PHYSICAL EXAMINATION:    Video physical exam  General: Patient appears well in no acute distress.   Skin: No visualized rash or lesions on visualized skin  Eyes: EOMI, no erythema, sclera icterus or discharge noted  Resp: Appears to be breathing comfortably without accessory muscle usage, speaking in full sentences, no cough  MSK: Appears to have normal range of motion based on visualized movements  Neurologic: No apparent tremors, facial movements symmetric  Psych: affect bright, alert and oriented       LABORATORY DATA:   Most Recent 3 CBC's:  Recent Labs   Lab Test 06/27/24  1004 06/06/24  1128 05/16/24  1306   WBC 6.6 6.6 7.9   HGB 12.7 12.6 12.6   MCV 93 94 93    203 215   ANEUTAUTO 3.1 3.0 3.9     Most Recent 3 BMP's:  Recent Labs   Lab Test 06/27/24  1004 06/06/24  1128 05/16/24  1306    142 141   POTASSIUM 4.4 4.0 4.3   CHLORIDE 104 105 105   CO2 27 27 27   BUN 12.2 14.4 11.9   CR 0.77 0.80 0.82   ANIONGAP 10 10 9   TAYLER 9.3 9.2 9.2   GLC 93 107* 85   PROTTOTAL 7.3 7.5 7.6   ALBUMIN 3.9 4.0 4.1    Most Recent 3 LFT's:  Recent Labs   Lab Test 06/27/24  1004 06/06/24  1128 05/16/24  1306   AST 43 46* 40   ALT 38 46 40   ALKPHOS 50 49 52   BILITOTAL 0.3 0.3 0.3    Most Recent 2 TSH and T4:  Recent Labs    Lab Test 05/16/24  1307 07/06/23  1216   TSH 0.47 0.77   I reviewed the labs above.  Labs for today to be drawn at her infusion visit.    IMAGING:  Echo 6/4/2024:  Interpretation Summary     Left ventricular systolic function is normal.  Biplane LVEF is 62%.  Left ventricular diastolic function is normal.  The right ventricle is normal in size and function.  No valve disease.     Compared to prior study, there is no significant change.     ASSESSMENT AND PLAN:       Hoda Brush is a 68-year-old woman with a history of metastatic ER-positive, LA-positive, HER2 positive breast cancer to the bones.  She is from [facility], formally from [facility], and came [place] live in the [place] with [place].   She had metastatic disease at the time of presentation with bone-only metastases by report.  She underwent neoadjuvant CAF, had a left mastectomy, left axillary lymph node dissection, radiation to the right hip, which included the right iliac region where she had metastatic disease.  She was initially on tamoxifen but then was switched to letrozole.  She continues on this and every 3-week trastuzumab.  She has had no evidence of disease progression for the last 3 years.  Markers are low and stable, pending today.  We have continued Herceptin every 3 weeks as well as daily letrozole. CT CAP shows stable pulmonary nodules and stable sclerotic bone metastasis.   - Clinically appropriate to continue herceptin and letrozole.  Labs pending later today.    - 6/2024 echo overall stable with EF of 62%, no significant change from last study.   - Continue CT CAP every 4 months, scheduled for August 2024.  She has follow-up with Dr. Aguiar at that time.     Bone Metastasis   - Hx of R maxillary osteonecrosis has healed, but Dr. Aguiar placed Xgeva on hold for now.  She is due for repeat DEXA ~July 2025.  - No report of pain today.     ALT/AST elevation  - AST and ALT mildly elevated in the past.  Labs pending today.   - Per  chart review she has had intermittent elevations over the last several years.   - Most recent CT with no concerning findings; does have hepatic steatosis and is s/p cholecystectomy in 2020.   - Continue to closely monitor.    Follow up   - Continue herceptin and labs every three weeks with provider visits every 3-6 weeks.  She is scheduled to see Dr. Aguiar post-CT in August.     KATHI Valencia, CNP  [facility] [place] [id number]  [phone]    24 minutes spent on the date of the encounter doing chart review, review of test results, interpretation of tests, patient visit, and documentation. The longitudinal plan of care for the diagnosis(es)/condition(s) as documented were addressed during this visit. Due to the added complexity in care, I will continue to support Hoda in the subsequent management and with ongoing continuity of care.

## 2024-07-18 NOTE — PROGRESS NOTES
Infusion Nursing Note:  Hoda Brush presents today for C117D1 Trazimera.    Patient seen by provider today: Yes: Kassidy Robles NP   present during visit today: Not Applicable.    Note: No complaints.      Intravenous Access:  Implanted Port.    Treatment Conditions:  Lab Results   Component Value Date    HGB 12.1 07/18/2024    WBC 7.4 07/18/2024    ANEU 3.1 06/22/2021    ANEUTAUTO 3.8 07/18/2024     07/18/2024        Lab Results   Component Value Date     07/18/2024    POTASSIUM 3.9 07/18/2024    CR 0.74 07/18/2024    TAYLER 8.9 07/18/2024    BILITOTAL 0.2 07/18/2024    ALBUMIN 4.0 07/18/2024    ALT 55 (H) 07/18/2024    AST 52 (H) 07/18/2024       Results reviewed, labs MET treatment parameters, ok to proceed with treatment.  ECHO/MUGA completed 6/4/24  EF 62%.      Post Infusion Assessment:  Patient tolerated infusion without incident.  Blood return noted pre and post infusion.  Site patent and intact, free from redness, edema or discomfort.  No evidence of extravasations.  Access discontinued per protocol.       Discharge Plan:   Patient declined prescription refills.  Discharge instructions reviewed with: Patient.  Patient and/or family verbalized understanding of discharge instructions and all questions answered.  AVS to patient via [employer].  Patient will return 8/8/24 for next appointment.   Patient discharged in stable condition accompanied by: self.  Departure Mode: Ambulatory.      EMILY WICK RN

## 2024-07-19 LAB — CANCER AG27-29 SERPL-ACNC: <9 U/ML

## 2024-08-08 ENCOUNTER — APPOINTMENT (OUTPATIENT)
Dept: LAB | Facility: CLINIC | Age: 69
End: 2024-08-08
Attending: NURSE PRACTITIONER
Payer: COMMERCIAL

## 2024-08-08 ENCOUNTER — INFUSION THERAPY VISIT (OUTPATIENT)
Dept: ONCOLOGY | Facility: CLINIC | Age: 69
End: 2024-08-08
Attending: NURSE PRACTITIONER
Payer: COMMERCIAL

## 2024-08-08 VITALS
WEIGHT: 180.5 LBS | SYSTOLIC BLOOD PRESSURE: 127 MMHG | RESPIRATION RATE: 16 BRPM | TEMPERATURE: 98 F | HEART RATE: 73 BPM | DIASTOLIC BLOOD PRESSURE: 79 MMHG | BODY MASS INDEX: 24.48 KG/M2 | OXYGEN SATURATION: 95 %

## 2024-08-08 DIAGNOSIS — C50.912 MALIGNANT NEOPLASM OF LEFT FEMALE BREAST, UNSPECIFIED ESTROGEN RECEPTOR STATUS, UNSPECIFIED SITE OF BREAST (H): Primary | ICD-10-CM

## 2024-08-08 DIAGNOSIS — C79.51 MALIGNANT NEOPLASM METASTATIC TO BONE (H): ICD-10-CM

## 2024-08-08 LAB
ALBUMIN SERPL BCG-MCNC: 3.9 G/DL (ref 3.5–5.2)
ALP SERPL-CCNC: 57 U/L (ref 40–150)
ALT SERPL W P-5'-P-CCNC: 41 U/L (ref 0–50)
ANION GAP SERPL CALCULATED.3IONS-SCNC: 10 MMOL/L (ref 7–15)
AST SERPL W P-5'-P-CCNC: 39 U/L (ref 0–45)
BASOPHILS # BLD AUTO: 0 10E3/UL (ref 0–0.2)
BASOPHILS NFR BLD AUTO: 0 %
BILIRUB SERPL-MCNC: 0.3 MG/DL
BUN SERPL-MCNC: 14.7 MG/DL (ref 8–23)
CALCIUM SERPL-MCNC: 9.4 MG/DL (ref 8.8–10.4)
CEA SERPL-MCNC: 1.2 NG/ML
CHLORIDE SERPL-SCNC: 106 MMOL/L (ref 98–107)
CREAT SERPL-MCNC: 0.8 MG/DL (ref 0.51–0.95)
EGFRCR SERPLBLD CKD-EPI 2021: 80 ML/MIN/1.73M2
EOSINOPHIL # BLD AUTO: 0.2 10E3/UL (ref 0–0.7)
EOSINOPHIL NFR BLD AUTO: 4 %
ERYTHROCYTE [DISTWIDTH] IN BLOOD BY AUTOMATED COUNT: 13.4 % (ref 10–15)
GLUCOSE SERPL-MCNC: 97 MG/DL (ref 70–99)
HCO3 SERPL-SCNC: 26 MMOL/L (ref 22–29)
HCT VFR BLD AUTO: 40.5 % (ref 35–47)
HGB BLD-MCNC: 12.6 G/DL (ref 11.7–15.7)
IMM GRANULOCYTES # BLD: 0 10E3/UL
IMM GRANULOCYTES NFR BLD: 0 %
LYMPHOCYTES # BLD AUTO: 2.7 10E3/UL (ref 0.8–5.3)
LYMPHOCYTES NFR BLD AUTO: 39 %
MCH RBC QN AUTO: 29.8 PG (ref 26.5–33)
MCHC RBC AUTO-ENTMCNC: 31.1 G/DL (ref 31.5–36.5)
MCV RBC AUTO: 96 FL (ref 78–100)
MONOCYTES # BLD AUTO: 0.5 10E3/UL (ref 0–1.3)
MONOCYTES NFR BLD AUTO: 7 %
NEUTROPHILS # BLD AUTO: 3.4 10E3/UL (ref 1.6–8.3)
NEUTROPHILS NFR BLD AUTO: 50 %
NRBC # BLD AUTO: 0 10E3/UL
NRBC BLD AUTO-RTO: 0 /100
PLATELET # BLD AUTO: 215 10E3/UL (ref 150–450)
POTASSIUM SERPL-SCNC: 4.4 MMOL/L (ref 3.4–5.3)
PROT SERPL-MCNC: 7.5 G/DL (ref 6.4–8.3)
RBC # BLD AUTO: 4.23 10E6/UL (ref 3.8–5.2)
SODIUM SERPL-SCNC: 142 MMOL/L (ref 135–145)
WBC # BLD AUTO: 6.8 10E3/UL (ref 4–11)

## 2024-08-08 PROCEDURE — 85041 AUTOMATED RBC COUNT: CPT | Performed by: INTERNAL MEDICINE

## 2024-08-08 PROCEDURE — 258N000003 HC RX IP 258 OP 636: Performed by: INTERNAL MEDICINE

## 2024-08-08 PROCEDURE — 250N000011 HC RX IP 250 OP 636: Performed by: INTERNAL MEDICINE

## 2024-08-08 PROCEDURE — 250N000011 HC RX IP 250 OP 636: Performed by: NURSE PRACTITIONER

## 2024-08-08 PROCEDURE — 36591 DRAW BLOOD OFF VENOUS DEVICE: CPT | Performed by: INTERNAL MEDICINE

## 2024-08-08 PROCEDURE — 96413 CHEMO IV INFUSION 1 HR: CPT

## 2024-08-08 PROCEDURE — 82378 CARCINOEMBRYONIC ANTIGEN: CPT | Performed by: INTERNAL MEDICINE

## 2024-08-08 PROCEDURE — 86300 IMMUNOASSAY TUMOR CA 15-3: CPT | Performed by: INTERNAL MEDICINE

## 2024-08-08 PROCEDURE — 80053 COMPREHEN METABOLIC PANEL: CPT | Performed by: INTERNAL MEDICINE

## 2024-08-08 RX ORDER — HEPARIN SODIUM (PORCINE) LOCK FLUSH IV SOLN 100 UNIT/ML 100 UNIT/ML
5 SOLUTION INTRAVENOUS EVERY 8 HOURS
Status: DISCONTINUED | OUTPATIENT
Start: 2024-08-08 | End: 2024-08-08 | Stop reason: HOSPADM

## 2024-08-08 RX ORDER — HEPARIN SODIUM (PORCINE) LOCK FLUSH IV SOLN 100 UNIT/ML 100 UNIT/ML
5 SOLUTION INTRAVENOUS ONCE
Status: COMPLETED | OUTPATIENT
Start: 2024-08-08 | End: 2024-08-08

## 2024-08-08 RX ADMIN — TRASTUZUMAB 450 MG: 150 INJECTION, POWDER, LYOPHILIZED, FOR SOLUTION INTRAVENOUS at 12:45

## 2024-08-08 RX ADMIN — Medication 5 ML: at 11:55

## 2024-08-08 RX ADMIN — SODIUM CHLORIDE 250 ML: 9 INJECTION, SOLUTION INTRAVENOUS at 12:45

## 2024-08-08 RX ADMIN — Medication 5 ML: at 13:22

## 2024-08-08 ASSESSMENT — PAIN SCALES - GENERAL: PAINLEVEL: NO PAIN (0)

## 2024-08-08 NOTE — NURSING NOTE
"Chief Complaint   Patient presents with    Chemotherapy     Cycle 118 Trastuzumab    Port Draw     Labs drawn via port by RN. VS taken.     Port accessed with 20 gauge, 3/4\" power needle by RN, labs collected, line flushed with saline and heparin.  Vitals taken. Pt checked in for appointment(s).     Carolyn Polanco, RN    "

## 2024-08-08 NOTE — PROGRESS NOTES
Infusion Nursing Note:  Hoda Brush presents today for Cycle 118 Day 1 Trastuzumab.    Patient spoke with provider today: No    Treatment Conditions:  Lab Results   Component Value Date    HGB 12.6 08/08/2024    WBC 6.8 08/08/2024    ANEU 3.1 06/22/2021    ANEUTAUTO 3.4 08/08/2024     08/08/2024        Lab Results   Component Value Date     08/08/2024    POTASSIUM 4.4 08/08/2024    CR 0.80 08/08/2024    TAYLER 9.4 08/08/2024    BILITOTAL 0.3 08/08/2024    ALBUMIN 3.9 08/08/2024    ALT 41 08/08/2024    AST 39 08/08/2024       Results reviewed, labs MET treatment parameters, ok to proceed with treatment.      Note:Patient arrives to infusion feeling well today.  She denies signs and symptoms of infection including:fever, cough, shortness of breath, sore throat, diarrhea, vomiting, rash, or pain with urination.    ECHO/MUGA completed 6/4/24  EF 62%.     Intravenous Access:  Implanted Port.    Post Infusion Assessment:  Patient tolerated infusion without incident.  Blood return noted pre and post infusion.  Access discontinued per protocol.    Discharge Plan:   Patient declined prescription refills.  Discharge instructions reviewed with: Patient.  Patient and/or family verbalized understanding of discharge instructions and all questions answered.  AVS to patient via [employer].  Patient will return 8/29/24 for next appointment.   Patient discharged in stable condition accompanied by: self.  Departure Mode: Ambulatory.      Linda Navarro RN

## 2024-08-09 LAB — CANCER AG27-29 SERPL-ACNC: 9.7 U/ML

## 2024-08-25 NOTE — PROGRESS NOTES
ONCOLOGY NOTE     Hoda Brush  Female, 69 year old, 1955  MRN: [id number]        Hoda is a 69 year old patient from [facility] and is seen here for continuation of care for her metastatic ER+HER2- breast cancer.  She is a refugee from [facility] and was initially seen in clinic with her daughter.  The only data we have from [facility] is an English translation of her records.       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  On 02/11/2014, she was seen by an oncologist.  The clinical staging of T4b N2 M1 was noted.   Her breast cancer was on the left side. There was no right breast cancer, confirmed by the patient and her daughter, correcting a possible error in the translated records.  ER was positive in 100% of the cells, NM at 14% and HER2 was 3+ positive.  It appears that this histopathologic information is on the mastectomy specimen. She underwent an MRI for staging of presumptive bone metastases which was performed 03/02/2014.  There were skeletal metastases in the thoracic vertebrae at 12, L1, L3, L5 and S1 vertebral body, ranging in size from 0.7-3.0 cm in size.  Ischial and right femur were also involved, as well as a large iliac mass measuring about 15 cm in the uterus. 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   Radiation Oncology consultation was performed 03/11/2014, and she was given radiation in 1 dose of 6 Gy to the right iliac lesion.        With the initial diagnosis, she initiated treatment with 6 cycles of CAF neoadjuvant chemotherapy 02/14, 07/07, 03/28, 04/18/14, 05/08 and 05/29/14. She also received monthly zoledronic acid.  She then underwent a modified left mastectomy of Palacios type and left lymphadenoectomy on 06/27/2014.   Pathologic examination showed number at [facility] was [id number] showed infiltrative grade 2 ductal cancer with 6 level 1 metastatic lympFollow up with Jossie for visits  and trastuzumab on 2-5, 2-26, 3-19 with CBC, CMP.  Echocardiogram on 3-19.  Follow up with me 4-9 with CBC, CMP, CA27.29 and CEA and with CT CAP on 4-8.h nodes and 3 level 2 metastatic lymph nodes.  The differentiation was intermediate.  The tumor was ER positive 70% of the cells, [place] positive in 40% of the cells and HER2 was 3+ by immunohistochemistry and the Ki-67 labeling index was 20%. Her staging after surgery was stage IV, pT4b N2 M1.  I don't see a biopsy of the skeletal metastases.  She then had continuation with chemotherapy with 4 cycles of Herceptin and taxane with monthly zoledronic acid.  Tamoxifen was initiated.  She then had two years of Herceptin and a decision was made not to continue further Herceptin.  She continued on zoledronic acid every 3 months. She was clinically stable. She then moved to the [place] new refugee from [facility].  She has now been changed to letrozole.  Denosumab has now been held for new diagnosis of osteonecrosis of the R maxilla.     History of cholecystectomy 2020.      TREATMENT HISTORY:  A. Initial diagnosis with metastatic breast cancer in [facility].  Neoadjuvant CAF x 6.   B. Left mastectomy. Left axillary node dissection.  C.  Radiation in 1 dose to R iliac region.  C. Herceptin for 2 years taxane for a prescribed course then stopped, monthly zoledronic acid.  She had 2 years of Herceptin with tamoxifen added after chemotherapy.  D.  Tamoxifen alone and zoledronic acid every 3 months starting 2014.  Letrozole was started   E.  [place]  We restarted Herceptin every 3 weeks and continued tamoxifen. Bone targeted agent changed to denosumab every 9 weeks. Zometa discontinued 2017.  Tamoxifen was changed to letrozole August 2019.    F.  Xgeva discontinued 12-17-19 because of dental issues.   G.  J+J vaccine March, 2021.  H.  Was on Zometa once May 11.  Reaction with eye lid swelling. Discontinued Zometa.  H.  Restart Xgeva 6-22-21.  Continuing the trastuzumab and  letrozole.  Both tolerated well.   I.  Discontinue Xgeva because of osteonecrosis of the maxilla.   Continue trastuzumab and letrozole.       INTERVAL HISTORY  She denies pain, fatigue, depression or anxiety.  She continues on trastuzumab, letrozole.  We have discontinued Xgeva because of osteonecrosis maxilla.  No pain, mild fatigue, no depression, no anxiety.  She has been feeling generally well.     REVIEW OF SYSTEMS:  A 10-point review of systems is entirely negative.     She is a eating a Mediterranean-style diet.  She is exercising by swimming.  I did advise adding a weightbearing exercise.  She takes vitamin D3 2000 International Units per day and calcium.  Her last DEXA scan performed a week ago shows a most valid negative T-score of -2.5 in the left hip, consistent with osteoporosis.     Notably, she has had no bowel or bladder problems.     PHYSICAL EXAMINATION:    VITALS:  /70 (BP Location: Right arm, Patient Position: Sitting, Cuff Size: Adult Regular)   Pulse 76   Temp 98  F (36.7  C) (Oral)   Resp 18   Wt 82.6 kg (182 lb)   SpO2 94%   BMI 24.68 kg/m    GENERAL:  Hoda appeared generally well.  HEENT:  She has no alopecia.  Examination of oropharynx reveals good dentition.  LYMPH:  There is no cervical, supraclavicular, subclavicular or axillary lymphadenopathy.  BREASTS: Breast exam deferred today.  LUNGS:  Clear to percussion and auscultation.  CARDIAC:  Heart has a regular rate and rhythm, S1, S2.  ABDOMEN:  Soft, nontender, without hepatosplenomegaly.  EXTREMITIES:  Without edema.  PSYCH:  Mood and affect were normal.     LABORATORY DATA:  CMP and CBC within normal limits.  CEA, CA27.29 pending.      CT Chest/Abdomen/Pelvis   Pending.  The CT at [facility] was not working and it was rescheduled.         ASSESSMENT AND PLAN:       1.  Hoda Brush is a 69-year-old woman with a history of ER-positive, IL-positive, HER2 positive breast cancer.  She is from [facility] and came to  live in the [place] with her daughter.  The tumor is ER positive, KS positive and HER2 positive.  She had metastatic disease at the time of presentation with bone-only metastases by report.  She underwent neoadjuvant CAF, had a left mastectomy, left axillary lymph node dissection, radiation to the right hip, which included the right iliac region where she had metastatic disease.  She was initially on tamoxifen but then was switched to letrozole.  She continues on this and every 3-week trastuzumab.  She has had no evidence of disease progression for the last 7 years and possible longer but we don't have detailed data from [facility].  Markers are low and stable.  We have continued Herceptin every 3 weeks as well as daily letrozole. CT CAP shows stable pulmonary nodules.   2.  Hoda Brush continues to do well on a combination of trastuzumab and letrozole. She has no evidence of disease progression.   Her ejection fraction remains stable.   She continues to do well on trastuzumab and letrozole.  She has no evidence of disease progression.  She will continue with every 4-month CT of the chest, abdomen and pelvis.    3.   The right maxillary osteonecrosis has healed and she is able to continue with Xgeva every 3 months. Hold the Xgeva and repeat Dexa scan in 2025.    4.  Continue the letrozole and trastuzuamb.  Letrozole has been well-tolerated, as has trastuzuamb.  No significant joint stiffness.  5.  Echo. Stable EF.  60-65%.  6.  Discussion of bone health and right maxillary osteonecrosis.  All healed.   Xgeva is being continued.   7. Follow up.  Continue Herceptin through the end of 2025 alternating providers every 6 weeks. CT CAP every 4 months, next CT in about 2 weeks.  Follow up with me 8-29 with trastuzumab.   Echo in September.  Discontinue Xgeva.  Dexa in 2025.  CBC, CMP every 3 weeks and CA27.29 and CEA every 6 weeks. Echo every 4 months, next on January 23.  CT CAP every 4 months       Thank you  for allowing us to participate in this patient's care.       Sincerely,      Lisandro Aguiar MD  Professor  [facility]  [phone]           I spent 35 minutes with the patient more than 50% of which was in counseling and coordination of care.

## 2024-08-29 ENCOUNTER — APPOINTMENT (OUTPATIENT)
Dept: LAB | Facility: CLINIC | Age: 69
End: 2024-08-29
Attending: INTERNAL MEDICINE
Payer: COMMERCIAL

## 2024-08-29 ENCOUNTER — ONCOLOGY VISIT (OUTPATIENT)
Dept: ONCOLOGY | Facility: CLINIC | Age: 69
End: 2024-08-29
Attending: INTERNAL MEDICINE
Payer: COMMERCIAL

## 2024-08-29 VITALS
TEMPERATURE: 98 F | SYSTOLIC BLOOD PRESSURE: 125 MMHG | OXYGEN SATURATION: 94 % | RESPIRATION RATE: 18 BRPM | BODY MASS INDEX: 24.68 KG/M2 | WEIGHT: 182 LBS | HEART RATE: 76 BPM | DIASTOLIC BLOOD PRESSURE: 70 MMHG

## 2024-08-29 DIAGNOSIS — Z51.11 ENCOUNTER FOR ANTINEOPLASTIC CHEMOTHERAPY: ICD-10-CM

## 2024-08-29 DIAGNOSIS — C50.912 MALIGNANT NEOPLASM OF LEFT FEMALE BREAST, UNSPECIFIED ESTROGEN RECEPTOR STATUS, UNSPECIFIED SITE OF BREAST (H): Primary | ICD-10-CM

## 2024-08-29 LAB
ALBUMIN SERPL BCG-MCNC: 3.9 G/DL (ref 3.5–5.2)
ALP SERPL-CCNC: 55 U/L (ref 40–150)
ALT SERPL W P-5'-P-CCNC: 44 U/L (ref 0–50)
ANION GAP SERPL CALCULATED.3IONS-SCNC: 11 MMOL/L (ref 7–15)
AST SERPL W P-5'-P-CCNC: 44 U/L (ref 0–45)
BASOPHILS # BLD AUTO: 0 10E3/UL (ref 0–0.2)
BASOPHILS NFR BLD AUTO: 1 %
BILIRUB SERPL-MCNC: 0.3 MG/DL
BUN SERPL-MCNC: 13.1 MG/DL (ref 8–23)
CALCIUM SERPL-MCNC: 9.2 MG/DL (ref 8.8–10.4)
CEA SERPL-MCNC: 1.2 NG/ML
CHLORIDE SERPL-SCNC: 104 MMOL/L (ref 98–107)
CREAT SERPL-MCNC: 0.83 MG/DL (ref 0.51–0.95)
EGFRCR SERPLBLD CKD-EPI 2021: 76 ML/MIN/1.73M2
EOSINOPHIL # BLD AUTO: 0.2 10E3/UL (ref 0–0.7)
EOSINOPHIL NFR BLD AUTO: 4 %
ERYTHROCYTE [DISTWIDTH] IN BLOOD BY AUTOMATED COUNT: 13.2 % (ref 10–15)
GLUCOSE SERPL-MCNC: 100 MG/DL (ref 70–99)
HCO3 SERPL-SCNC: 27 MMOL/L (ref 22–29)
HCT VFR BLD AUTO: 39.4 % (ref 35–47)
HGB BLD-MCNC: 12.5 G/DL (ref 11.7–15.7)
IMM GRANULOCYTES # BLD: 0 10E3/UL
IMM GRANULOCYTES NFR BLD: 0 %
LYMPHOCYTES # BLD AUTO: 2.7 10E3/UL (ref 0.8–5.3)
LYMPHOCYTES NFR BLD AUTO: 40 %
MCH RBC QN AUTO: 29.7 PG (ref 26.5–33)
MCHC RBC AUTO-ENTMCNC: 31.7 G/DL (ref 31.5–36.5)
MCV RBC AUTO: 94 FL (ref 78–100)
MONOCYTES # BLD AUTO: 0.4 10E3/UL (ref 0–1.3)
MONOCYTES NFR BLD AUTO: 6 %
NEUTROPHILS # BLD AUTO: 3.3 10E3/UL (ref 1.6–8.3)
NEUTROPHILS NFR BLD AUTO: 49 %
NRBC # BLD AUTO: 0 10E3/UL
NRBC BLD AUTO-RTO: 0 /100
PLATELET # BLD AUTO: 203 10E3/UL (ref 150–450)
POTASSIUM SERPL-SCNC: 4.1 MMOL/L (ref 3.4–5.3)
PROT SERPL-MCNC: 7.4 G/DL (ref 6.4–8.3)
RBC # BLD AUTO: 4.21 10E6/UL (ref 3.8–5.2)
SODIUM SERPL-SCNC: 142 MMOL/L (ref 135–145)
WBC # BLD AUTO: 6.6 10E3/UL (ref 4–11)

## 2024-08-29 PROCEDURE — 82378 CARCINOEMBRYONIC ANTIGEN: CPT | Performed by: INTERNAL MEDICINE

## 2024-08-29 PROCEDURE — 86300 IMMUNOASSAY TUMOR CA 15-3: CPT | Performed by: INTERNAL MEDICINE

## 2024-08-29 PROCEDURE — 99214 OFFICE O/P EST MOD 30 MIN: CPT | Performed by: INTERNAL MEDICINE

## 2024-08-29 PROCEDURE — 80053 COMPREHEN METABOLIC PANEL: CPT | Performed by: INTERNAL MEDICINE

## 2024-08-29 PROCEDURE — 258N000003 HC RX IP 258 OP 636: Performed by: INTERNAL MEDICINE

## 2024-08-29 PROCEDURE — G0463 HOSPITAL OUTPT CLINIC VISIT: HCPCS | Mod: 25 | Performed by: INTERNAL MEDICINE

## 2024-08-29 PROCEDURE — 96413 CHEMO IV INFUSION 1 HR: CPT

## 2024-08-29 PROCEDURE — 85025 COMPLETE CBC W/AUTO DIFF WBC: CPT | Performed by: INTERNAL MEDICINE

## 2024-08-29 PROCEDURE — 36591 DRAW BLOOD OFF VENOUS DEVICE: CPT | Performed by: INTERNAL MEDICINE

## 2024-08-29 PROCEDURE — 250N000011 HC RX IP 250 OP 636: Performed by: INTERNAL MEDICINE

## 2024-08-29 RX ORDER — HEPARIN SODIUM (PORCINE) LOCK FLUSH IV SOLN 100 UNIT/ML 100 UNIT/ML
5 SOLUTION INTRAVENOUS EVERY 8 HOURS
OUTPATIENT
Start: 2024-12-12

## 2024-08-29 RX ORDER — METHYLPREDNISOLONE SODIUM SUCCINATE 125 MG/2ML
125 INJECTION, POWDER, LYOPHILIZED, FOR SOLUTION INTRAMUSCULAR; INTRAVENOUS
Status: CANCELLED
Start: 2024-09-19

## 2024-08-29 RX ORDER — EPINEPHRINE 1 MG/ML
0.3 INJECTION, SOLUTION INTRAMUSCULAR; SUBCUTANEOUS EVERY 5 MIN PRN
OUTPATIENT
Start: 2024-10-31

## 2024-08-29 RX ORDER — ALBUTEROL SULFATE 0.83 MG/ML
2.5 SOLUTION RESPIRATORY (INHALATION)
OUTPATIENT
Start: 2024-11-21

## 2024-08-29 RX ORDER — DIPHENHYDRAMINE HYDROCHLORIDE 50 MG/ML
50 INJECTION INTRAMUSCULAR; INTRAVENOUS
Status: CANCELLED
Start: 2024-09-19

## 2024-08-29 RX ORDER — SODIUM CHLORIDE 9 MG/ML
1000 INJECTION, SOLUTION INTRAVENOUS CONTINUOUS PRN
Start: 2025-01-02

## 2024-08-29 RX ORDER — DIPHENHYDRAMINE HYDROCHLORIDE 50 MG/ML
50 INJECTION INTRAMUSCULAR; INTRAVENOUS
Start: 2024-10-31

## 2024-08-29 RX ORDER — EPINEPHRINE 0.3 MG/.3ML
0.3 INJECTION SUBCUTANEOUS EVERY 5 MIN PRN
OUTPATIENT
Start: 2024-12-12

## 2024-08-29 RX ORDER — DIPHENHYDRAMINE HCL 25 MG
50 CAPSULE ORAL ONCE
Start: 2024-12-12

## 2024-08-29 RX ORDER — ALBUTEROL SULFATE 90 UG/1
1-2 AEROSOL, METERED RESPIRATORY (INHALATION)
Start: 2024-10-10

## 2024-08-29 RX ORDER — LORAZEPAM 2 MG/ML
0.5 INJECTION INTRAMUSCULAR EVERY 4 HOURS PRN
Start: 2024-10-31

## 2024-08-29 RX ORDER — EPINEPHRINE 1 MG/ML
0.3 INJECTION, SOLUTION INTRAMUSCULAR; SUBCUTANEOUS EVERY 5 MIN PRN
Status: CANCELLED | OUTPATIENT
Start: 2024-09-19

## 2024-08-29 RX ORDER — DIPHENHYDRAMINE HYDROCHLORIDE 50 MG/ML
50 INJECTION INTRAMUSCULAR; INTRAVENOUS
Start: 2024-12-12

## 2024-08-29 RX ORDER — EPINEPHRINE 1 MG/ML
0.3 INJECTION, SOLUTION INTRAMUSCULAR; SUBCUTANEOUS EVERY 5 MIN PRN
OUTPATIENT
Start: 2024-10-10

## 2024-08-29 RX ORDER — ALBUTEROL SULFATE 90 UG/1
1-2 AEROSOL, METERED RESPIRATORY (INHALATION)
Start: 2025-01-02

## 2024-08-29 RX ORDER — SODIUM CHLORIDE 9 MG/ML
1000 INJECTION, SOLUTION INTRAVENOUS CONTINUOUS PRN
Status: CANCELLED
Start: 2024-09-19

## 2024-08-29 RX ORDER — ALBUTEROL SULFATE 90 UG/1
1-2 AEROSOL, METERED RESPIRATORY (INHALATION)
Status: CANCELLED
Start: 2024-09-19

## 2024-08-29 RX ORDER — LORAZEPAM 2 MG/ML
0.5 INJECTION INTRAMUSCULAR EVERY 4 HOURS PRN
Start: 2024-10-10

## 2024-08-29 RX ORDER — ACETAMINOPHEN 325 MG/1
650 TABLET ORAL
Status: CANCELLED | OUTPATIENT
Start: 2024-08-29

## 2024-08-29 RX ORDER — ALBUTEROL SULFATE 0.83 MG/ML
2.5 SOLUTION RESPIRATORY (INHALATION)
Status: CANCELLED | OUTPATIENT
Start: 2024-09-19

## 2024-08-29 RX ORDER — EPINEPHRINE 0.3 MG/.3ML
0.3 INJECTION SUBCUTANEOUS EVERY 5 MIN PRN
OUTPATIENT
Start: 2025-01-02

## 2024-08-29 RX ORDER — DIPHENHYDRAMINE HCL 25 MG
50 CAPSULE ORAL ONCE
Start: 2024-10-31

## 2024-08-29 RX ORDER — EPINEPHRINE 0.3 MG/.3ML
0.3 INJECTION SUBCUTANEOUS EVERY 5 MIN PRN
OUTPATIENT
Start: 2024-11-21

## 2024-08-29 RX ORDER — HEPARIN SODIUM (PORCINE) LOCK FLUSH IV SOLN 100 UNIT/ML 100 UNIT/ML
5 SOLUTION INTRAVENOUS EVERY 8 HOURS
OUTPATIENT
Start: 2025-01-02

## 2024-08-29 RX ORDER — EPINEPHRINE 0.3 MG/.3ML
0.3 INJECTION SUBCUTANEOUS EVERY 5 MIN PRN
OUTPATIENT
Start: 2024-10-10

## 2024-08-29 RX ORDER — LORAZEPAM 2 MG/ML
0.5 INJECTION INTRAMUSCULAR EVERY 4 HOURS PRN
Start: 2024-12-12

## 2024-08-29 RX ORDER — DIPHENHYDRAMINE HYDROCHLORIDE 50 MG/ML
50 INJECTION INTRAMUSCULAR; INTRAVENOUS
Start: 2024-11-21

## 2024-08-29 RX ORDER — METHYLPREDNISOLONE SODIUM SUCCINATE 125 MG/2ML
125 INJECTION, POWDER, LYOPHILIZED, FOR SOLUTION INTRAMUSCULAR; INTRAVENOUS
Start: 2024-11-21

## 2024-08-29 RX ORDER — DIPHENHYDRAMINE HCL 25 MG
50 CAPSULE ORAL ONCE
Start: 2024-10-10

## 2024-08-29 RX ORDER — ALBUTEROL SULFATE 0.83 MG/ML
2.5 SOLUTION RESPIRATORY (INHALATION)
OUTPATIENT
Start: 2024-10-10

## 2024-08-29 RX ORDER — EPINEPHRINE 0.3 MG/.3ML
0.3 INJECTION SUBCUTANEOUS EVERY 5 MIN PRN
OUTPATIENT
Start: 2024-10-31

## 2024-08-29 RX ORDER — DIPHENHYDRAMINE HYDROCHLORIDE 50 MG/ML
50 INJECTION INTRAMUSCULAR; INTRAVENOUS
Status: CANCELLED
Start: 2024-08-29

## 2024-08-29 RX ORDER — EPINEPHRINE 0.3 MG/.3ML
0.3 INJECTION SUBCUTANEOUS EVERY 5 MIN PRN
Status: CANCELLED | OUTPATIENT
Start: 2024-08-29

## 2024-08-29 RX ORDER — ACETAMINOPHEN 325 MG/1
650 TABLET ORAL
Status: CANCELLED | OUTPATIENT
Start: 2024-09-19

## 2024-08-29 RX ORDER — LORAZEPAM 2 MG/ML
0.5 INJECTION INTRAMUSCULAR EVERY 4 HOURS PRN
Start: 2024-11-21

## 2024-08-29 RX ORDER — EPINEPHRINE 1 MG/ML
0.3 INJECTION, SOLUTION INTRAMUSCULAR; SUBCUTANEOUS EVERY 5 MIN PRN
Status: CANCELLED | OUTPATIENT
Start: 2024-08-29

## 2024-08-29 RX ORDER — DIPHENHYDRAMINE HCL 25 MG
50 CAPSULE ORAL ONCE
Status: CANCELLED
Start: 2024-08-29

## 2024-08-29 RX ORDER — SODIUM CHLORIDE 9 MG/ML
1000 INJECTION, SOLUTION INTRAVENOUS CONTINUOUS PRN
Start: 2024-12-12

## 2024-08-29 RX ORDER — METHYLPREDNISOLONE SODIUM SUCCINATE 125 MG/2ML
125 INJECTION, POWDER, LYOPHILIZED, FOR SOLUTION INTRAMUSCULAR; INTRAVENOUS
Status: CANCELLED
Start: 2024-08-29

## 2024-08-29 RX ORDER — ACETAMINOPHEN 325 MG/1
650 TABLET ORAL
OUTPATIENT
Start: 2025-01-02

## 2024-08-29 RX ORDER — HEPARIN SODIUM (PORCINE) LOCK FLUSH IV SOLN 100 UNIT/ML 100 UNIT/ML
5 SOLUTION INTRAVENOUS EVERY 8 HOURS
OUTPATIENT
Start: 2024-10-10

## 2024-08-29 RX ORDER — EPINEPHRINE 1 MG/ML
0.3 INJECTION, SOLUTION INTRAMUSCULAR; SUBCUTANEOUS EVERY 5 MIN PRN
OUTPATIENT
Start: 2025-01-02

## 2024-08-29 RX ORDER — METHYLPREDNISOLONE SODIUM SUCCINATE 125 MG/2ML
125 INJECTION, POWDER, LYOPHILIZED, FOR SOLUTION INTRAMUSCULAR; INTRAVENOUS
Start: 2024-10-10

## 2024-08-29 RX ORDER — SODIUM CHLORIDE 9 MG/ML
1000 INJECTION, SOLUTION INTRAVENOUS CONTINUOUS PRN
Start: 2024-11-21

## 2024-08-29 RX ORDER — LORAZEPAM 2 MG/ML
0.5 INJECTION INTRAMUSCULAR EVERY 4 HOURS PRN
Status: CANCELLED
Start: 2024-08-29

## 2024-08-29 RX ORDER — ALBUTEROL SULFATE 0.83 MG/ML
2.5 SOLUTION RESPIRATORY (INHALATION)
OUTPATIENT
Start: 2024-12-12

## 2024-08-29 RX ORDER — SODIUM CHLORIDE 9 MG/ML
1000 INJECTION, SOLUTION INTRAVENOUS CONTINUOUS PRN
Status: CANCELLED
Start: 2024-08-29

## 2024-08-29 RX ORDER — HEPARIN SODIUM (PORCINE) LOCK FLUSH IV SOLN 100 UNIT/ML 100 UNIT/ML
5 SOLUTION INTRAVENOUS EVERY 8 HOURS
Status: DISCONTINUED | OUTPATIENT
Start: 2024-08-29 | End: 2024-08-29 | Stop reason: HOSPADM

## 2024-08-29 RX ORDER — ALBUTEROL SULFATE 0.83 MG/ML
2.5 SOLUTION RESPIRATORY (INHALATION)
OUTPATIENT
Start: 2024-10-31

## 2024-08-29 RX ORDER — EPINEPHRINE 0.3 MG/.3ML
0.3 INJECTION SUBCUTANEOUS EVERY 5 MIN PRN
Status: CANCELLED | OUTPATIENT
Start: 2024-09-19

## 2024-08-29 RX ORDER — DIPHENHYDRAMINE HCL 25 MG
50 CAPSULE ORAL ONCE
Status: CANCELLED
Start: 2024-09-19

## 2024-08-29 RX ORDER — ALBUTEROL SULFATE 0.83 MG/ML
2.5 SOLUTION RESPIRATORY (INHALATION)
OUTPATIENT
Start: 2025-01-02

## 2024-08-29 RX ORDER — HEPARIN SODIUM (PORCINE) LOCK FLUSH IV SOLN 100 UNIT/ML 100 UNIT/ML
5 SOLUTION INTRAVENOUS EVERY 8 HOURS
Status: CANCELLED | OUTPATIENT
Start: 2024-08-29

## 2024-08-29 RX ORDER — SODIUM CHLORIDE 9 MG/ML
1000 INJECTION, SOLUTION INTRAVENOUS CONTINUOUS PRN
Start: 2024-10-10

## 2024-08-29 RX ORDER — DIPHENHYDRAMINE HYDROCHLORIDE 50 MG/ML
50 INJECTION INTRAMUSCULAR; INTRAVENOUS
Start: 2025-01-02

## 2024-08-29 RX ORDER — METHYLPREDNISOLONE SODIUM SUCCINATE 125 MG/2ML
125 INJECTION, POWDER, LYOPHILIZED, FOR SOLUTION INTRAMUSCULAR; INTRAVENOUS
Start: 2025-01-02

## 2024-08-29 RX ORDER — METHYLPREDNISOLONE SODIUM SUCCINATE 125 MG/2ML
125 INJECTION, POWDER, LYOPHILIZED, FOR SOLUTION INTRAMUSCULAR; INTRAVENOUS
Start: 2024-10-31

## 2024-08-29 RX ORDER — ACETAMINOPHEN 325 MG/1
650 TABLET ORAL
OUTPATIENT
Start: 2024-10-10

## 2024-08-29 RX ORDER — EPINEPHRINE 1 MG/ML
0.3 INJECTION, SOLUTION INTRAMUSCULAR; SUBCUTANEOUS EVERY 5 MIN PRN
OUTPATIENT
Start: 2024-11-21

## 2024-08-29 RX ORDER — METHYLPREDNISOLONE SODIUM SUCCINATE 125 MG/2ML
125 INJECTION, POWDER, LYOPHILIZED, FOR SOLUTION INTRAMUSCULAR; INTRAVENOUS
Start: 2024-12-12

## 2024-08-29 RX ORDER — DIPHENHYDRAMINE HYDROCHLORIDE 50 MG/ML
50 INJECTION INTRAMUSCULAR; INTRAVENOUS
Start: 2024-10-10

## 2024-08-29 RX ORDER — DIPHENHYDRAMINE HCL 25 MG
50 CAPSULE ORAL ONCE
Start: 2025-01-02

## 2024-08-29 RX ORDER — HEPARIN SODIUM (PORCINE) LOCK FLUSH IV SOLN 100 UNIT/ML 100 UNIT/ML
5 SOLUTION INTRAVENOUS EVERY 8 HOURS
OUTPATIENT
Start: 2024-11-21

## 2024-08-29 RX ORDER — SODIUM CHLORIDE 9 MG/ML
1000 INJECTION, SOLUTION INTRAVENOUS CONTINUOUS PRN
Start: 2024-10-31

## 2024-08-29 RX ORDER — ALBUTEROL SULFATE 90 UG/1
1-2 AEROSOL, METERED RESPIRATORY (INHALATION)
Start: 2024-11-21

## 2024-08-29 RX ORDER — ALBUTEROL SULFATE 90 UG/1
1-2 AEROSOL, METERED RESPIRATORY (INHALATION)
Start: 2024-12-12

## 2024-08-29 RX ORDER — ACETAMINOPHEN 325 MG/1
650 TABLET ORAL
OUTPATIENT
Start: 2024-10-31

## 2024-08-29 RX ORDER — DIPHENHYDRAMINE HCL 25 MG
50 CAPSULE ORAL ONCE
Start: 2024-11-21

## 2024-08-29 RX ORDER — ALBUTEROL SULFATE 90 UG/1
1-2 AEROSOL, METERED RESPIRATORY (INHALATION)
Status: CANCELLED
Start: 2024-08-29

## 2024-08-29 RX ORDER — LORAZEPAM 2 MG/ML
0.5 INJECTION INTRAMUSCULAR EVERY 4 HOURS PRN
Status: CANCELLED
Start: 2024-09-19

## 2024-08-29 RX ORDER — ACETAMINOPHEN 325 MG/1
650 TABLET ORAL
OUTPATIENT
Start: 2024-12-12

## 2024-08-29 RX ORDER — LORAZEPAM 2 MG/ML
0.5 INJECTION INTRAMUSCULAR EVERY 4 HOURS PRN
Start: 2025-01-02

## 2024-08-29 RX ORDER — EPINEPHRINE 1 MG/ML
0.3 INJECTION, SOLUTION INTRAMUSCULAR; SUBCUTANEOUS EVERY 5 MIN PRN
OUTPATIENT
Start: 2024-12-12

## 2024-08-29 RX ORDER — HEPARIN SODIUM (PORCINE) LOCK FLUSH IV SOLN 100 UNIT/ML 100 UNIT/ML
5 SOLUTION INTRAVENOUS EVERY 8 HOURS
OUTPATIENT
Start: 2024-10-31

## 2024-08-29 RX ORDER — ALBUTEROL SULFATE 90 UG/1
1-2 AEROSOL, METERED RESPIRATORY (INHALATION)
Start: 2024-10-31

## 2024-08-29 RX ORDER — HEPARIN SODIUM (PORCINE) LOCK FLUSH IV SOLN 100 UNIT/ML 100 UNIT/ML
5 SOLUTION INTRAVENOUS EVERY 8 HOURS
Status: CANCELLED | OUTPATIENT
Start: 2024-09-19

## 2024-08-29 RX ORDER — ALBUTEROL SULFATE 0.83 MG/ML
2.5 SOLUTION RESPIRATORY (INHALATION)
Status: CANCELLED | OUTPATIENT
Start: 2024-08-29

## 2024-08-29 RX ORDER — ACETAMINOPHEN 325 MG/1
650 TABLET ORAL
OUTPATIENT
Start: 2024-11-21

## 2024-08-29 RX ADMIN — Medication 5 ML: at 10:41

## 2024-08-29 RX ADMIN — SODIUM CHLORIDE 250 ML: 9 INJECTION, SOLUTION INTRAVENOUS at 10:07

## 2024-08-29 RX ADMIN — Medication 5 ML: at 08:21

## 2024-08-29 RX ADMIN — TRASTUZUMAB 450 MG: 150 INJECTION, POWDER, LYOPHILIZED, FOR SOLUTION INTRAVENOUS at 10:09

## 2024-08-29 ASSESSMENT — PAIN SCALES - GENERAL: PAINLEVEL: NO PAIN (0)

## 2024-08-29 NOTE — PATIENT INSTRUCTIONS
[facility] Triage and after hours / weekends / holidays:  [phone]    Please call the triage or after hours line if you experience a temperature greater than or equal to 100.4, shaking chills, have uncontrolled nausea, vomiting and/or diarrhea, dizziness, shortness of breath, chest pain, bleeding, unexplained bruising, or if you have any other new/concerning symptoms, questions or concerns.      If you are having any concerning symptoms or wish to speak to a provider before your next infusion visit, please call triage to notify them so we can adequately serve you.     If you need a refill on a narcotic prescription or other medication, please call before your infusion appointment.                August 2024 Sunday Monday Tuesday Wednesday Thursday Friday Saturday                       1     2     3       4     5     6     7     8    LAB CENTRAL  11:30 AM   (15 min.)   [facility] LAB DRAW   [facility]    ONC INFUSION 1 HR (60 MIN)  12:00 PM   (60 min.)    ONC INFUSION NURSE   [facility] 9     10       11     12     13     14     15     16     17       18     19     20     21     22     23     24       25     26     27     28     29    LAB CENTRAL   8:15 AM   (15 min.)   [facility] LAB DRAW   [facility]    RETURN CCSL   8:25 AM   (30 min.)   Lisandro Aguiar MD   [facility]    ONC INFUSION 1 HR (60 MIN)   9:30 AM   (60 min.)    ONC INFUSION NURSE   [facility] 30 31 September 2024 Sunday Monday Tuesday Wednesday Thursday Friday Saturday   1     2     3     4    PODIATRY PROCEDURE   9:00 AM   (30 min.)   Dallin Jimenez DPM   [facility] 5    CT CHEST/ABDOMEN/PELVIS W   3:50 PM   (20 min.)   EICCT1   [facility] 6     7       8     9     10     11     12     13     14        15     16     17     18     19    ECHO COMPLETE   8:45 AM   (60 min.)   UCECHCR2   [facility]    LAB CENTRAL  11:00 AM   (15 min.)   [facility] LAB DRAW   [facility]    ONC INFUSION 1 HR (60 MIN)  11:30 AM   (60 min.)    ONC INFUSION NURSE   [facility] 20     21       22     23     24     25     26     27     28       29     30                                              Lab Results:  Recent Results (from the past 12 hour(s))   Comprehensive metabolic panel    Collection Time: 08/29/24  8:31 AM   Result Value Ref Range    Sodium 142 135 - 145 mmol/L    Potassium 4.1 3.4 - 5.3 mmol/L    Carbon Dioxide (CO2) 27 22 - 29 mmol/L    Anion Gap 11 7 - 15 mmol/L    Urea Nitrogen 13.1 8.0 - 23.0 mg/dL    Creatinine 0.83 0.51 - 0.95 mg/dL    GFR Estimate 76 >60 mL/min/1.73m2    Calcium 9.2 8.8 - 10.4 mg/dL    Chloride 104 98 - 107 mmol/L    Glucose 100 (H) 70 - 99 mg/dL    Alkaline Phosphatase 55 40 - 150 U/L    AST 44 0 - 45 U/L    ALT 44 0 - 50 U/L    Protein Total 7.4 6.4 - 8.3 g/dL    Albumin 3.9 3.5 - 5.2 g/dL    Bilirubin Total 0.3 <=1.2 mg/dL   CBC with platelets and differential    Collection Time: 08/29/24  8:31 AM   Result Value Ref Range    WBC Count 6.6 4.0 - 11.0 10e3/uL    RBC Count 4.21 3.80 - 5.20 10e6/uL    Hemoglobin 12.5 11.7 - 15.7 g/dL    Hematocrit 39.4 35.0 - 47.0 %    MCV 94 78 - 100 fL    MCH 29.7 26.5 - 33.0 pg    MCHC 31.7 31.5 - 36.5 g/dL    RDW 13.2 10.0 - 15.0 %    Platelet Count 203 150 - 450 10e3/uL    % Neutrophils 49 %    % Lymphocytes 40 %    % Monocytes 6 %    % Eosinophils 4 %    % Basophils 1 %    % Immature Granulocytes 0 %    NRBCs per 100 WBC 0 <1 /100    Absolute Neutrophils 3.3 1.6 - 8.3 10e3/uL    Absolute Lymphocytes 2.7 0.8 - 5.3 10e3/uL    Absolute Monocytes 0.4 0.0 - 1.3 10e3/uL    Absolute Eosinophils 0.2 0.0 - 0.7 10e3/uL    Absolute Basophils 0.0 0.0 - 0.2 10e3/uL    Absolute Immature  Granulocytes 0.0 <=0.4 10e3/uL    Absolute NRBCs 0.0 10e3/uL

## 2024-08-29 NOTE — NURSING NOTE
Oncology Rooming Note    August 29, 2024 8:39 AM   Hoda Brush is a 69 year old female who presents for:    Chief Complaint   Patient presents with    Oncology Clinic Visit     Malignant neoplasm of left breast,       Port Draw     Labs drawn via port by RN in lab, vitals taken.     Initial Vitals: /70 (BP Location: Right arm, Patient Position: Sitting, Cuff Size: Adult Regular)   Pulse 76   Temp 98  F (36.7  C) (Oral)   Resp 18   Wt 82.6 kg (182 lb)   SpO2 94%   BMI 24.68 kg/m   Estimated body mass index is 24.68 kg/m  as calculated from the following:    Height as of 7/18/24: 1.829 m (6').    Weight as of this encounter: 82.6 kg (182 lb). Body surface area is 2.05 meters squared.  No Pain (0) Comment: Data Unavailable   No LMP recorded. Patient is postmenopausal.  Allergies reviewed: Yes  Medications reviewed: Yes    Medications: Medication refills not needed today.  Pharmacy name entered into MD2U: [id number] [place], [place] - [address]    Frailty Screening:   Is the patient here for a new oncology consult visit in cancer care? 2. No      Clinical concerns:  none       Thuy Jeronimo

## 2024-08-29 NOTE — NURSING NOTE
Chief Complaint   Patient presents with    Oncology Clinic Visit     Malignant neoplasm of left breast,       Port Draw     Labs drawn via port by RN in lab, vitals taken.      Labs drawn via port by RN. Port accessed with 20g, 3/4in, power needle. Flushed with saline and heparin. Pt tolerated well. Vitals taken. Pt checked into next appt.     Jennifer Sharpe RN

## 2024-08-29 NOTE — LETTER
8/29/2024      Hoda Brush  [address]  [facility] [address]      Dear Colleague,    Thank you for referring your patient, Hoda Brush, to the [facility]. Please see a copy of my visit note below.    ONCOLOGY NOTE     Hoda Brush  Female, 69 year old, 1955  MRN: [id number]        Hoda is a 69 year old patient from [facility] and is seen here for continuation of care for her metastatic ER+HER2- breast cancer.  She is a refugee from [facility] and was initially seen in clinic with her daughter.  The only data we have from [facility] is an English translation of her records.       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  On 02/11/2014, she was seen by an oncologist.  The clinical staging of T4b N2 M1 was noted.   Her breast cancer was on the left side. There was no right breast cancer, confirmed by the patient and her daughter, correcting a possible error in the translated records.  ER was positive in 100% of the cells, IA at 14% and HER2 was 3+ positive.  It appears that this histopathologic information is on the mastectomy specimen. She underwent an MRI for staging of presumptive bone metastases which was performed 03/02/2014.  There were skeletal metastases in the thoracic vertebrae at 12, L1, L3, L5 and S1 vertebral body, ranging in size from 0.7-3.0 cm in size.  Ischial and right femur were also involved, as well as a large iliac mass measuring about 15 cm in the uterus. 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   Radiation Oncology consultation was performed 03/11/2014, and she was given radiation in 1 dose of 6 Gy to the right iliac lesion.        With the initial diagnosis, she initiated treatment with 6 cycles of CAF neoadjuvant chemotherapy 02/14, 07/07, 03/28, 04/18/14, 05/08 and 05/29/14. She also received monthly zoledronic acid.  She then underwent a  modified left mastectomy of [place]ios type and left lymphadenoectomy on 06/27/2014.   Pathologic examination showed number at [facility] was [id number] showed infiltrative grade 2 ductal cancer with 6 level 1 metastatic lympFollow up with Jossie for visits and trastuzumab on 2-5, 2-26, 3-19 with CBC, CMP.  Echocardiogram on 3-19.  Follow up with me 4-9 with CBC, CMP, CA27.29 and CEA and with CT CAP on 4-8.h nodes and 3 level 2 metastatic lymph nodes.  The differentiation was intermediate.  The tumor was ER positive 70% of the cells, MA positive in 40% of the cells and HER2 was 3+ by immunohistochemistry and the Ki-67 labeling index was 20%. Her staging after surgery was stage IV, pT4b N2 M1.  I don't see a biopsy of the skeletal metastases.  She then had continuation with chemotherapy with 4 cycles of Herceptin and taxane with monthly zoledronic acid.  Tamoxifen was initiated.  She then had two years of Herceptin and a decision was made not to continue further Herceptin.  She continued on zoledronic acid every 3 months. She was clinically stable. [place] then [place] the [place] new refugee from [facility].  She has now been changed to letrozole.  Denosumab has now been held for new diagnosis of osteonecrosis of the R maxilla.     History of cholecystectomy 2020.      TREATMENT HISTORY:  A. Initial diagnosis with metastatic breast cancer in [facility].  Neoadjuvant CAF x 6.   B. Left mastectomy. Left axillary node dissection.  C.  Radiation in 1 dose to R iliac region.  C. Herceptin for 2 years taxane for a prescribed course then stopped, monthly zoledronic acid.  She had 2 years of Herceptin with tamoxifen added after chemotherapy.  D.  Tamoxifen alone and zoledronic acid every 3 months starting 2014.  Letrozole was started   E.  [place]  We restarted Herceptin every 3 weeks and continued tamoxifen. Bone targeted agent changed to denosumab every 9 weeks. Zometa discontinued 2017.  Tamoxifen was changed to  letrozole August 2019.    F.  Xgeva discontinued 12-17-19 because of dental issues.   BHAVIN.  J+J vaccine March, 2021.  H.  Was on Zometa once May 11.  Reaction with eye lid swelling. Discontinued Zometa.  H.  Restart Xgeva 6-22-21.  Continuing the trastuzumab and letrozole.  Both tolerated well.   I.  Discontinue Xgeva because of osteonecrosis of the maxilla.   Continue trastuzumab and letrozole.       INTERVAL HISTORY  She denies pain, fatigue, depression or anxiety.  She continues on trastuzumab, letrozole.  We have discontinued Xgeva because of osteonecrosis maxilla.  No pain, mild fatigue, no depression, no anxiety.  She has been feeling generally well.     REVIEW OF SYSTEMS:  A 10-point review of systems is entirely negative.     She is a eating a Mediterranean-style diet.  She is exercising by swimming.  I did advise adding a weightbearing exercise.  She takes vitamin D3 2000 International Units per day and calcium.  Her last DEXA scan performed a week ago shows a most valid negative T-score of -2.5 in the left hip, consistent with osteoporosis.     Notably, she has had no bowel or bladder problems.     PHYSICAL EXAMINATION:    VITALS:  /70 (BP Location: Right arm, Patient Position: Sitting, Cuff Size: Adult Regular)   Pulse 76   Temp 98  F (36.7  C) (Oral)   Resp 18   Wt 82.6 kg (182 lb)   SpO2 94%   BMI 24.68 kg/m    GENERAL:  Hoda appeared generally well.  HEENT:  She has no alopecia.  Examination of oropharynx reveals good dentition.  LYMPH:  There is no cervical, supraclavicular, subclavicular or axillary lymphadenopathy.  BREASTS: Breast exam deferred today.  LUNGS:  Clear to percussion and auscultation.  CARDIAC:  Heart has a regular rate and rhythm, S1, S2.  ABDOMEN:  Soft, nontender, without hepatosplenomegaly.  EXTREMITIES:  Without edema.  PSYCH:  Mood and affect were normal.     LABORATORY DATA:  CMP and CBC within normal limits.  CEA, CA27.29 pending.      CT Chest/Abdomen/Pelvis    Pending.  The CT at [facility] was not working and it was rescheduled.         ASSESSMENT AND PLAN:       1.  Hoda Brush is a 69-year-old woman with a history of ER-positive, RI-positive, HER2 positive breast cancer.  She is from [facility] and came to live in the [place] with [place].  The tumor is ER positive, RI positive and HER2 positive.  She had metastatic disease at the time of presentation with bone-only metastases by report.  She underwent neoadjuvant CAF, had a left mastectomy, left axillary lymph node dissection, radiation to the right hip, which included the right iliac region where she had metastatic disease.  She was initially on tamoxifen but then was switched to letrozole.  She continues on this and every 3-week trastuzumab.  She has had no evidence of disease progression for the last 7 years and possible longer but we don't have detailed data from [facility].  Markers are low and stable.  We have continued Herceptin every 3 weeks as well as daily letrozole. CT CAP shows stable pulmonary nodules.   2.  Hoda Brush continues to do well on a combination of trastuzumab and letrozole. She has no evidence of disease progression.   Her ejection fraction remains stable.   She continues to do well on trastuzumab and letrozole.  She has no evidence of disease progression.  She will continue with every 4-month CT of the chest, abdomen and pelvis.    3.   The right maxillary osteonecrosis has healed and she is able to continue with Xgeva every 3 months. Hold the Xgeva and repeat Dexa scan in 2025.    4.  Continue the letrozole and trastuzuamb.  Letrozole has been well-tolerated, as has trastuzuamb.  No significant joint stiffness.  5.  Echo. Stable EF.  60-65%.  6.  Discussion of bone health and right maxillary osteonecrosis.  All healed.   Xgeva is being continued.   7. Follow up.  Continue Herceptin through the end of 2025 alternating providers every 6 weeks. CT CAP every 4  months, next CT in about 2 weeks.  Follow up with me 8-29 with trastuzumab.   Echo in September.  Discontinue Xgeva.  Dexa in 2025.  CBC, CMP every 3 weeks and CA27.29 and CEA every 6 weeks. Echo every 4 months, next on January 23.  CT CAP every 4 months       Thank you for allowing us to participate in this patient's care.       Sincerely,      Lisandro Aguiar MD  Professor  [facility]  [phone]           I spent 35 minutes with the patient more than 50% of which was in counseling and coordination of care.      Again, thank you for allowing me to participate in the care of your patient.        Sincerely,        Lisandro Aguiar MD

## 2024-08-29 NOTE — PROGRESS NOTES
Infusion Nursing Note:  Hoda Brush presents today for cycle 119 day 1 trastuzumab.    Patient seen by provider today: Yes: Dr. Aguiar   present during visit today: Not Applicable.    Note: Pt comes to infusion today with no questions or concerns.  Pt was seen by provider in clinic and wishes to proceed with today's planned treatment.    Intravenous Access:  Implanted Port.    Treatment Conditions:  Lab Results   Component Value Date    HGB 12.5 08/29/2024    WBC 6.6 08/29/2024    ANEU 3.1 06/22/2021    ANEUTAUTO 3.3 08/29/2024     08/29/2024        Lab Results   Component Value Date     08/29/2024    POTASSIUM 4.1 08/29/2024    CR 0.83 08/29/2024    TAYLER 9.2 08/29/2024    BILITOTAL 0.3 08/29/2024    ALBUMIN 3.9 08/29/2024    ALT 44 08/29/2024    AST 44 08/29/2024       Results reviewed, labs MET treatment parameters, ok to proceed with treatment.    Post Infusion Assessment:  Patient tolerated infusion without incident.  Blood return noted pre and post infusion.  Site patent and intact, free from redness, edema or discomfort.  No evidence of extravasations.  Access discontinued per protocol.     Discharge Plan:   Patient declined prescription refills.  Discharge instructions reviewed with: Patient.  Patient and/or family verbalized understanding of discharge instructions and all questions answered.  AVS to patient via ZaelabT.  Patient will return 09/19/24 for next appointment.   Patient discharged in stable condition accompanied by: self.  Departure Mode: Ambulatory.      Emily Meese, RN

## 2024-08-31 LAB — CANCER AG27-29 SERPL-ACNC: 13.3 U/ML

## 2024-09-04 ENCOUNTER — OFFICE VISIT (OUTPATIENT)
Dept: PODIATRY | Facility: CLINIC | Age: 69
End: 2024-09-04
Payer: COMMERCIAL

## 2024-09-04 VITALS — WEIGHT: 182 LBS | BODY MASS INDEX: 24.68 KG/M2 | DIASTOLIC BLOOD PRESSURE: 72 MMHG | SYSTOLIC BLOOD PRESSURE: 138 MMHG

## 2024-09-04 DIAGNOSIS — L60.0 INGROWING LEFT GREAT TOENAIL: Primary | ICD-10-CM

## 2024-09-04 DIAGNOSIS — M79.675 GREAT TOE PAIN, LEFT: ICD-10-CM

## 2024-09-04 PROCEDURE — 11750 EXCISION NAIL&NAIL MATRIX: CPT | Mod: TA | Performed by: PODIATRIST

## 2024-09-04 NOTE — PATIENT INSTRUCTIONS
Thank you for choosing [facility] Podiat[facility] / Foot & Ankle Surgery!    DR. RASHEED'S CLINIC LOCATIONS:     [facility] TRIAGE LINE: [phone]   [address] [place] [address] APPOINTMENTS: [phone]   [place] [address] RADIOLOGY: [phone]   (Every other Tues - Wed - Fri PM) SET UP SURGERY: [phone]    PHYSICAL THERAPY: [phone]   [place] SPECIALTY BILLING QUESTIONS: [phone] [id number] FAX: [phone]   [place] [address]    (Thurs & Fri AM)         DR. RASHEED'S RECOMMENDATIONS FOR HEALING AFTER A NAIL REMOVAL PROCEDURE    1. Try to keep the bandage on until bedtime or the morning after your procedure.     2. Some bleeding is normal. If bleeding seems excessive to you, place ice on top of your foot for 15-20 minutes, elevate your foot above heart level and reinforce the dressing (add additional covering)    3. Over the counter pain medication (tylenol / ibuprofen), elevating your foot and cold application is all you should need for pain control. Pain is usually easily managed.      4. For 1-2 weeks, soak your foot twice a day in mild, skin friendly soap water solution for 15 minutes (dish soap, hand soap, body wash, etc). After soaking, blot the area dry and apply a regular band aid. An antibiotic ointment is not needed.  If the guaze dressing sticks to your toe, soak your foot for a few minutes with the dressing on. This should help loosen it.     6. It is normal to experience some discomfort and redness around the nail for several days following the procedure. Drainage will likely appear a red and/or yellow. This is normal. If your toe is still draining fluid after 2 weeks, continue soaking.    7. Initial discomfort might last for 2-3 days. You may resume with regular activity as soon as you want,  as long as you keep the wound clean, dry and follow the soaking instruction. It is recommended that you do not enter public swimming pools/hot tubs while your toe is  draining.    8. If you are experiencing worsening pain and redness or notice pus after 2-3 days please contact the clinic. Ask to speak with a triage nurse and they will inform our team of your symptoms and we can advise if a follow up is needed.      IF YOU HAD A PERMANENT NAIL REMOVAL PROCEDURE    - Healing will take longer and you might need to soak for 2-3 weeks. You are healing from the nail being removed and the chemical burn.  - Expect some drainage, crust  and redness. This is from the acid (phenol) that was applied and the chemical burn.  - After soaking, use a Q-tip to clean under and around the skin where the nail was removed. This helps get rid of the brownish material and helps the wound drain  - Sometimes it is hard to know if the redness and drainage is normal versus a developing infection. If in doubt, reach out to Dr. Jimenez's office via My Chart or phone.   - If redness extends back to the middle of the toe, you should have it looked at in clinic.     After 2-3 days, if you are experiencing worsening pain and redness, or notice pus, please contact the clinic. Ask to speak with a triage nurse and they will inform our team of your symptoms and we can advise if a follow up is needed.

## 2024-09-04 NOTE — PROGRESS NOTES
"ASSESSMENT:  Encounter Diagnoses   Name Primary?    Ingrowing left great toenail, medial edge Yes    Great toe pain, left      MEDICAL DECISION MAKING:  The potential causes and nature of an ingrown toenail were discussed with the patient.  We reviewed the natural history/prognosis of the condition and potential risks if no treatment is provided.      Treatment options discussed included conservative management (oral antibiotics when coexisting infection, soaking of the foot, the use of a toe spacer, adequate width shoes) as well as surgical management (partial or total nail removal).  The pros and cons of both forms of treatment were reviewed.      I discussed the option of permanent removal via chemical matrixectomy. This is a reasonable option when there is no co-existing infection.     Hoda Brush elected to a a partial nail chemical matrixectomy, medial edge, left hallux nail unit.     Chemical Matrixectomy/ Permanent nail removal:    The chemical matrixectomy procedure was reviewed, including risks, benefits, and post-procedure cares.  The risk of discomfort and infection was discussed.  The chance of nail regrowth was discussed.  Verbal and written consent was obtained.  The site was marked and the \"Time Out\" called.      The base of the left great toe was injected with 2 cc of  2% Lidocaine plain.  The toe was then prepped with betadine solution.  A tourniquet was applied around the base of the toe to for hemostasis.   Next the toe was checked for adequate anesthesia.  The lateral nail was freed from the nail bed and marginal soft tissue attachments with a small spatula. A longitudinal cut in the nail was made 2-3mm from the lateral skin fold via a nail splitter.  It was completed under the eponychium with a [place] blade.)  The nail edge was firmly grasped with a hemostat and removed in total.      Using small applicator sticks, three 30 second applications of phenol to the nail matrix were " performed.  The area was diluted with a copious amount of isopropyl alcohol.  It was blotted dry.    Next the tourniquet was removed. Bacitracin ointment was applied to the nail bed, followed by a compressive dressing.  Hoda Brush tolerated the procedure well. Post-procedure instruction handout was provided.    Dallin Jimenez DPM, DARIEL, MS    [facility]le [facility]      Disclaimer: This note consists of symbols derived from keyboarding, dictation and/or voice recognition software. As a result, there may be errors in the script that have gone undetected. Please consider this when interpreting information found in this chart.    Dallin Jimenez DPM, DARIEL, MS    [facility]le [facility]      ____________________________________________________________________    HPI:       Hoda presents today with her daughter who helps with [place] interpretation.  She is reporting pain along the medial skin fold of the left hallux nail unit.    History of multiple toenail procedures, most recently an avulsion of the left hallux lateral edge in March 2024.  *  Past Medical History:   Diagnosis Date    Breast cancer metastasized to bone (H)     Lymphedema of left upper extremity    *  *  Past Surgical History:   Procedure Laterality Date    APPENDECTOMY      BIOPSY  2014    COLONOSCOPY N/A 2/1/2018    Procedure: COLONOSCOPY;  Colonoscopy;  Surgeon: Phillip Rowe MD;  Location:  GI    ESOPHAGOSCOPY, GASTROSCOPY, DUODENOSCOPY (EGD), COMBINED N/A 2/16/2018    Procedure: COMBINED ESOPHAGOSCOPY, GASTROSCOPY, DUODENOSCOPY (EGD), BIOPSY SINGLE OR MULTIPLE;;  Surgeon: Paty Herman MD;  Location:  GI    ESOPHAGOSCOPY, GASTROSCOPY, DUODENOSCOPY (EGD), COMBINED N/A 2/5/2020    Procedure: ESOPHAGOGASTRODUODENOSCOPY (EGD);  Surgeon: Anthony Alonso MD;  Location:  GI    INSERT PORT VASCULAR ACCESS Right 9/15/2017    Procedure: INSERT PORT VASCULAR  ACCESS;  Central venous chest port placement, right;  Surgeon: Dmitriy Hernandez PA-C;  Location: [facility] OR    LAPAROSCOPIC CHOLECYSTECTOMY N/A 8/31/2019    Procedure: LAPAROSCOPIC CHOLECYSTECTOMY;  Surgeon: Maldonado Billy MD;  Location: [facility] OR    MASTECTOMY Left 06/27/2014    and lymph node resection    MASTECTOMY SIMPLE BILATERAL      Mastectomy 06/27/2014   *  *  Current Outpatient Medications   Medication Sig Dispense Refill    acetaminophen (TYLENOL) 500 MG tablet Take 1,000 mg by mouth every 6 hours as needed for mild pain      calcium carbonate (OS-TAYLER) 500 MG tablet Take 1 tablet by mouth daily      famotidine (PEPCID) 20 MG tablet Take 1 tablet (20 mg) by mouth 2 times daily 180 tablet 3    fluticasone (FLONASE) 50 MCG/ACT nasal spray Spray 2 sprays into both nostrils daily 16 g 3    letrozole (FEMARA) 2.5 MG tablet Take 1 tablet (2.5 mg) by mouth daily 90 tablet 3    order for DME Equipment being ordered: 2 Mastectomy bras and 1 prosthetheses. 2 Piece 0    traZODone (DESYREL) 50 MG tablet TAKE 1/2 TABLET(25 MG) BY MOUTH EVERY NIGHT AS NEEDED FOR SLEEP 45 tablet 2    VITAMIN D, CHOLECALCIFEROL, PO Take 1,000 Units by mouth daily      silver sulfADIAZINE (SILVADENE) 1 % external cream Apply to procedure site left great toe twice daily with dressing changes until healed (Patient not taking: Reported on 6/6/2024) 50 g 0         EXAM:    Vitals: /72   Wt 82.6 kg (182 lb)   BMI 24.68 kg/m    BMI: Body mass index is 24.68 kg/m .    Vascular:  Pedal pulses are palpable for both the DP and PT arteries.  CFT < 3 sec.  No edema.      Neuro: Light touch sensation is intact to the L4, L5, S1 distributions  No evidence of weakness, spasticity, or contracture in the lower extremities.     Derm: Tenderness along the medial skin fold of the left hallux nail unit.  Light erythema.  No drainage.  The nail edge is somewhat dystrophic.

## 2024-09-04 NOTE — LETTER
"9/4/2024      Hoda Brush  [address]  [facility] [address]      Dear Colleague,    Thank you for referring your patient, Hoda Brush, to the [facility]. Please see a copy of my visit note below.    ASSESSMENT:  Encounter Diagnoses   Name Primary?     Ingrowing left great toenail, medial edge Yes     Great toe pain, left      MEDICAL DECISION MAKING:  The potential causes and nature of an ingrown toenail were discussed with the patient.  We reviewed the natural history/prognosis of the condition and potential risks if no treatment is provided.      Treatment options discussed included conservative management (oral antibiotics when coexisting infection, soaking of the foot, the use of a toe spacer, adequate width shoes) as well as surgical management (partial or total nail removal).  The pros and cons of both forms of treatment were reviewed.      I discussed the option of permanent removal via chemical matrixectomy. This is a reasonable option when there is no co-existing infection.     Hoda Brush elected to a a partial nail chemical matrixectomy, medial edge, left hallux nail unit.     Chemical Matrixectomy/ Permanent nail removal:    The chemical matrixectomy procedure was reviewed, including risks, benefits, and post-procedure cares.  The risk of discomfort and infection was discussed.  The chance of nail regrowth was discussed.  Verbal and written consent was obtained.  The site was marked and the \"Time Out\" called.      The base of the left great toe was injected with 2 cc of  2% Lidocaine plain.  The toe was then prepped with betadine solution.  A tourniquet was applied around the base of the toe to for hemostasis.   Next the toe was checked for adequate anesthesia.  The lateral nail was freed from the nail bed and marginal soft tissue attachments with a small spatula. A longitudinal cut in the nail was made 2-3mm from the lateral skin fold " via a nail splitter.  It was completed under the eponychium with a [place]eca blade.)  The nail edge was firmly grasped with a hemostat and removed in total.      Using small applicator sticks, three 30 second applications of phenol to the nail matrix were performed.  The area was diluted with a copious amount of isopropyl alcohol.  It was blotted dry.    Next the tourniquet was removed. Bacitracin ointment was applied to the nail bed, followed by a compressive dressing.  Hoda Brush tolerated the procedure well. Post-procedure instruction handout was provided.    Dallin Jimenez DPM, DARIEL, [facility] Department of Podiatry/Foot & Ankle Surgery      Disclaimer: This note consists of symbols derived from keyboarding, dictation and/or voice recognition software. As a result, there may be errors in the script that have gone undetected. Please consider this when interpreting information found in this chart.    Dallin Jimenez DPM, DARIEL, MS    [place] Department of Podiatry/Foot & Ankle Surgery      ____________________________________________________________________    HPI:       Hoda presents today with her daughter who helps with [place] interpretation.  She is reporting pain along the medial skin fold of the left hallux nail unit.    History of multiple toenail procedures, most recently an avulsion of the left hallux lateral edge in March 2024.  *  Past Medical History:   Diagnosis Date     Breast cancer metastasized to bone (H)      Lymphedema of left upper extremity    *  *  Past Surgical History:   Procedure Laterality Date     APPENDECTOMY       BIOPSY  2014     COLONOSCOPY N/A 2/1/2018    Procedure: COLONOSCOPY;  Colonoscopy;  Surgeon: Phillip Rowe MD;  Location:  GI     ESOPHAGOSCOPY, GASTROSCOPY, DUODENOSCOPY (EGD), COMBINED N/A 2/16/2018    Procedure: COMBINED ESOPHAGOSCOPY, GASTROSCOPY, DUODENOSCOPY (EGD), BIOPSY SINGLE OR MULTIPLE;;  Surgeon: Paty Herman MD;  Location:   GI     ESOPHAGOSCOPY, GASTROSCOPY, DUODENOSCOPY (EGD), COMBINED N/A 2/5/2020    Procedure: ESOPHAGOGASTRODUODENOSCOPY (EGD);  Surgeon: Anthony Alonso MD;  Location: [facility] GI     INSERT PORT VASCULAR ACCESS Right 9/15/2017    Procedure: INSERT PORT VASCULAR ACCESS;  Central venous chest port placement, right;  Surgeon: Dmitriy Hernandez PA-C;  Location: [facility] OR     LAPAROSCOPIC CHOLECYSTECTOMY N/A 8/31/2019    Procedure: LAPAROSCOPIC CHOLECYSTECTOMY;  Surgeon: Maldonado Billy MD;  Location: [facility] OR     MASTECTOMY Left 06/27/2014    and lymph node resection     MASTECTOMY SIMPLE BILATERAL      Mastectomy 06/27/2014   *  *  Current Outpatient Medications   Medication Sig Dispense Refill     acetaminophen (TYLENOL) 500 MG tablet Take 1,000 mg by mouth every 6 hours as needed for mild pain       calcium carbonate (OS-TAYLER) 500 MG tablet Take 1 tablet by mouth daily       famotidine (PEPCID) 20 MG tablet Take 1 tablet (20 mg) by mouth 2 times daily 180 tablet 3     fluticasone (FLONASE) 50 MCG/ACT nasal spray Spray 2 sprays into both nostrils daily 16 g 3     letrozole (FEMARA) 2.5 MG tablet Take 1 tablet (2.5 mg) by mouth daily 90 tablet 3     order for DME Equipment being ordered: 2 Mastectomy bras and 1 prosthetheses. 2 Piece 0     traZODone (DESYREL) 50 MG tablet TAKE 1/2 TABLET(25 MG) BY MOUTH EVERY NIGHT AS NEEDED FOR SLEEP 45 tablet 2     VITAMIN D, CHOLECALCIFEROL, PO Take 1,000 Units by mouth daily       silver sulfADIAZINE (SILVADENE) 1 % external cream Apply to procedure site left great toe twice daily with dressing changes until healed (Patient not taking: Reported on 6/6/2024) 50 g 0         EXAM:    Vitals: /72   Wt 82.6 kg (182 lb)   BMI 24.68 kg/m    BMI: Body mass index is 24.68 kg/m .    Vascular:  Pedal pulses are palpable for both the DP and PT arteries.  CFT < 3 sec.  No edema.      Neuro: Light touch sensation is intact to the L4, L5, S1 distributions  No evidence of weakness,  spasticity, or contracture in the lower extremities.     Derm: Tenderness along the medial skin fold of the left hallux nail unit.  Light erythema.  No drainage.  The nail edge is somewhat dystrophic.        Again, thank you for allowing me to participate in the care of your patient.        Sincerely,        Dallin Jimenez DPM

## 2024-09-05 ENCOUNTER — ANCILLARY PROCEDURE (OUTPATIENT)
Dept: CT IMAGING | Facility: CLINIC | Age: 69
End: 2024-09-05
Attending: INTERNAL MEDICINE
Payer: COMMERCIAL

## 2024-09-05 DIAGNOSIS — C50.912 MALIGNANT NEOPLASM OF LEFT FEMALE BREAST, UNSPECIFIED ESTROGEN RECEPTOR STATUS, UNSPECIFIED SITE OF BREAST (H): ICD-10-CM

## 2024-09-05 PROCEDURE — 250N000011 HC RX IP 250 OP 636: Performed by: INTERNAL MEDICINE

## 2024-09-05 PROCEDURE — 250N000009 HC RX 250: Performed by: INTERNAL MEDICINE

## 2024-09-05 PROCEDURE — 71260 CT THORAX DX C+: CPT

## 2024-09-05 RX ORDER — IOPAMIDOL 755 MG/ML
90 INJECTION, SOLUTION INTRAVASCULAR ONCE
Status: COMPLETED | OUTPATIENT
Start: 2024-09-05 | End: 2024-09-05

## 2024-09-05 RX ORDER — HEPARIN SODIUM (PORCINE) LOCK FLUSH IV SOLN 100 UNIT/ML 100 UNIT/ML
5 SOLUTION INTRAVENOUS ONCE
Status: COMPLETED | OUTPATIENT
Start: 2024-09-05 | End: 2024-09-05

## 2024-09-05 RX ADMIN — HEPARIN SODIUM (PORCINE) LOCK FLUSH IV SOLN 100 UNIT/ML 5 ML: 100 SOLUTION at 15:38

## 2024-09-05 RX ADMIN — IOPAMIDOL 90 ML: 755 INJECTION, SOLUTION INTRAVENOUS at 15:38

## 2024-09-05 RX ADMIN — SODIUM CHLORIDE 66 ML: 9 INJECTION, SOLUTION INTRAVENOUS at 15:38

## 2024-09-19 ENCOUNTER — INFUSION THERAPY VISIT (OUTPATIENT)
Dept: ONCOLOGY | Facility: CLINIC | Age: 69
End: 2024-09-19
Attending: INTERNAL MEDICINE
Payer: COMMERCIAL

## 2024-09-19 ENCOUNTER — ANCILLARY PROCEDURE (OUTPATIENT)
Dept: CARDIOLOGY | Facility: CLINIC | Age: 69
End: 2024-09-19
Attending: INTERNAL MEDICINE
Payer: COMMERCIAL

## 2024-09-19 ENCOUNTER — APPOINTMENT (OUTPATIENT)
Dept: LAB | Facility: CLINIC | Age: 69
End: 2024-09-19
Attending: INTERNAL MEDICINE
Payer: COMMERCIAL

## 2024-09-19 VITALS
OXYGEN SATURATION: 97 % | BODY MASS INDEX: 24.48 KG/M2 | RESPIRATION RATE: 18 BRPM | TEMPERATURE: 98.1 F | SYSTOLIC BLOOD PRESSURE: 109 MMHG | DIASTOLIC BLOOD PRESSURE: 65 MMHG | HEART RATE: 64 BPM | WEIGHT: 180.5 LBS

## 2024-09-19 DIAGNOSIS — C50.912 MALIGNANT NEOPLASM OF LEFT FEMALE BREAST, UNSPECIFIED ESTROGEN RECEPTOR STATUS, UNSPECIFIED SITE OF BREAST (H): Primary | ICD-10-CM

## 2024-09-19 DIAGNOSIS — Z51.11 ENCOUNTER FOR ANTINEOPLASTIC CHEMOTHERAPY: ICD-10-CM

## 2024-09-19 LAB
ALBUMIN SERPL BCG-MCNC: 4 G/DL (ref 3.5–5.2)
ALP SERPL-CCNC: 57 U/L (ref 40–150)
ALT SERPL W P-5'-P-CCNC: 46 U/L (ref 0–50)
ANION GAP SERPL CALCULATED.3IONS-SCNC: 10 MMOL/L (ref 7–15)
AST SERPL W P-5'-P-CCNC: 46 U/L (ref 0–45)
BASOPHILS # BLD AUTO: 0 10E3/UL (ref 0–0.2)
BASOPHILS NFR BLD AUTO: 0 %
BILIRUB SERPL-MCNC: 0.4 MG/DL
BUN SERPL-MCNC: 15.4 MG/DL (ref 8–23)
CALCIUM SERPL-MCNC: 9.3 MG/DL (ref 8.8–10.4)
CEA SERPL-MCNC: 1.1 NG/ML
CHLORIDE SERPL-SCNC: 106 MMOL/L (ref 98–107)
CREAT SERPL-MCNC: 0.8 MG/DL (ref 0.51–0.95)
EGFRCR SERPLBLD CKD-EPI 2021: 80 ML/MIN/1.73M2
EOSINOPHIL # BLD AUTO: 0.2 10E3/UL (ref 0–0.7)
EOSINOPHIL NFR BLD AUTO: 2 %
ERYTHROCYTE [DISTWIDTH] IN BLOOD BY AUTOMATED COUNT: 13.5 % (ref 10–15)
GLUCOSE SERPL-MCNC: 88 MG/DL (ref 70–99)
HCO3 SERPL-SCNC: 27 MMOL/L (ref 22–29)
HCT VFR BLD AUTO: 39.7 % (ref 35–47)
HGB BLD-MCNC: 12.3 G/DL (ref 11.7–15.7)
IMM GRANULOCYTES # BLD: 0 10E3/UL
IMM GRANULOCYTES NFR BLD: 0 %
LVEF ECHO: NORMAL
LYMPHOCYTES # BLD AUTO: 2.8 10E3/UL (ref 0.8–5.3)
LYMPHOCYTES NFR BLD AUTO: 36 %
MCH RBC QN AUTO: 29.1 PG (ref 26.5–33)
MCHC RBC AUTO-ENTMCNC: 31 G/DL (ref 31.5–36.5)
MCV RBC AUTO: 94 FL (ref 78–100)
MONOCYTES # BLD AUTO: 0.5 10E3/UL (ref 0–1.3)
MONOCYTES NFR BLD AUTO: 6 %
NEUTROPHILS # BLD AUTO: 4.1 10E3/UL (ref 1.6–8.3)
NEUTROPHILS NFR BLD AUTO: 55 %
NRBC # BLD AUTO: 0 10E3/UL
NRBC BLD AUTO-RTO: 0 /100
PLATELET # BLD AUTO: 196 10E3/UL (ref 150–450)
POTASSIUM SERPL-SCNC: 4.1 MMOL/L (ref 3.4–5.3)
PROT SERPL-MCNC: 7.4 G/DL (ref 6.4–8.3)
RBC # BLD AUTO: 4.22 10E6/UL (ref 3.8–5.2)
SODIUM SERPL-SCNC: 143 MMOL/L (ref 135–145)
WBC # BLD AUTO: 7.6 10E3/UL (ref 4–11)

## 2024-09-19 PROCEDURE — 82378 CARCINOEMBRYONIC ANTIGEN: CPT | Performed by: INTERNAL MEDICINE

## 2024-09-19 PROCEDURE — 250N000011 HC RX IP 250 OP 636: Performed by: INTERNAL MEDICINE

## 2024-09-19 PROCEDURE — 85025 COMPLETE CBC W/AUTO DIFF WBC: CPT | Performed by: INTERNAL MEDICINE

## 2024-09-19 PROCEDURE — 258N000003 HC RX IP 258 OP 636: Performed by: INTERNAL MEDICINE

## 2024-09-19 PROCEDURE — 82040 ASSAY OF SERUM ALBUMIN: CPT | Performed by: INTERNAL MEDICINE

## 2024-09-19 PROCEDURE — 93356 MYOCRD STRAIN IMG SPCKL TRCK: CPT | Performed by: INTERNAL MEDICINE

## 2024-09-19 PROCEDURE — 86300 IMMUNOASSAY TUMOR CA 15-3: CPT | Performed by: INTERNAL MEDICINE

## 2024-09-19 PROCEDURE — 93306 TTE W/DOPPLER COMPLETE: CPT | Mod: GC | Performed by: INTERNAL MEDICINE

## 2024-09-19 PROCEDURE — 36591 DRAW BLOOD OFF VENOUS DEVICE: CPT | Performed by: INTERNAL MEDICINE

## 2024-09-19 PROCEDURE — 96413 CHEMO IV INFUSION 1 HR: CPT

## 2024-09-19 RX ORDER — HEPARIN SODIUM (PORCINE) LOCK FLUSH IV SOLN 100 UNIT/ML 100 UNIT/ML
5 SOLUTION INTRAVENOUS EVERY 8 HOURS
Status: DISCONTINUED | OUTPATIENT
Start: 2024-09-19 | End: 2024-09-19 | Stop reason: HOSPADM

## 2024-09-19 RX ORDER — HEPARIN SODIUM (PORCINE) LOCK FLUSH IV SOLN 100 UNIT/ML 100 UNIT/ML
5 SOLUTION INTRAVENOUS ONCE
Status: COMPLETED | OUTPATIENT
Start: 2024-09-19 | End: 2024-09-19

## 2024-09-19 RX ADMIN — TRASTUZUMAB 450 MG: 150 INJECTION, POWDER, LYOPHILIZED, FOR SOLUTION INTRAVENOUS at 12:02

## 2024-09-19 RX ADMIN — Medication 5 ML: at 12:35

## 2024-09-19 RX ADMIN — Medication 5 ML: at 10:11

## 2024-09-19 ASSESSMENT — PAIN SCALES - GENERAL: PAINLEVEL: NO PAIN (0)

## 2024-09-19 NOTE — NURSING NOTE
Chief Complaint   Patient presents with    Port Draw     Labs drawn via port     Port accessed with 20g flat needle by RN, labs collected, line flushed with saline and heparin.  Vitals taken. Pt checked in for appointment(s).     Jeanna SHERIFF RN PHN BSN  BMT/Oncology Lab

## 2024-09-19 NOTE — PROGRESS NOTES
Infusion Nursing Note:  Hoda Brush presents today for Cycle 120 Trastuzumab.      Note: Hoda comes to infusion today with no questions or concerns.  She denies pain  She has been afebrile and denies signs and symptoms of infection including: cough, SOB, sore throat, diarrhea, vomiting, rash, or pain with urination.   She wishes to proceed with today's planned treatment  .      Intravenous Access:  Implanted Port accessed in lab    Treatment Conditions:  Component      Latest Ref Rng 9/19/2024  10:20 AM   WBC      4.0 - 11.0 10e3/uL 7.6    RBC Count      3.80 - 5.20 10e6/uL 4.22    Hemoglobin      11.7 - 15.7 g/dL 12.3    Hematocrit      35.0 - 47.0 % 39.7    MCV      78 - 100 fL 94    MCH      26.5 - 33.0 pg 29.1    MCHC      31.5 - 36.5 g/dL 31.0 (L)    RDW      10.0 - 15.0 % 13.5    Platelet Count      150 - 450 10e3/uL 196    % Neutrophils      % 55    % Lymphocytes      % 36    % Monocytes      % 6    % Eosinophils      % 2    % Basophils      % 0    % Immature Granulocytes      % 0    NRBCs per 100 WBC      <1 /100 0    Absolute Neutrophils      1.6 - 8.3 10e3/uL 4.1    Absolute Lymphocytes      0.8 - 5.3 10e3/uL 2.8    Absolute Monocytes      0.0 - 1.3 10e3/uL 0.5    Absolute Eosinophils      0.0 - 0.7 10e3/uL 0.2    Absolute Basophils      0.0 - 0.2 10e3/uL 0.0    Absolute Immature Granulocytes      <=0.4 10e3/uL 0.0    Absolute NRBCs      10e3/uL 0.0    Sodium      135 - 145 mmol/L 143    Potassium      3.4 - 5.3 mmol/L 4.1    Carbon Dioxide (CO2)      22 - 29 mmol/L 27    Anion Gap      7 - 15 mmol/L 10    Urea Nitrogen      8.0 - 23.0 mg/dL 15.4    Creatinine      0.51 - 0.95 mg/dL 0.80    GFR Estimate      >60 mL/min/1.73m2 80    Calcium      8.8 - 10.4 mg/dL 9.3    Chloride      98 - 107 mmol/L 106    Glucose      70 - 99 mg/dL 88    Alkaline Phosphatase      40 - 150 U/L 57    AST      0 - 45 U/L 46 (H)    ALT      0 - 50 U/L 46    Protein Total      6.4 - 8.3 g/dL 7.4    Albumin      3.5 -  5.2 g/dL 4.0    Bilirubin Total      <=1.2 mg/dL 0.4       ECHO obtained today, 9/19; preliminary results LVEF is 55-60% per echo technician.    Results reviewed, labs MET treatment parameters, ok to proceed with treatment.      Post Infusion Assessment:  Patient tolerated infusion without incident.       Discharge Plan:   AVS to patient via MYCHART.  Patient will return 10/10/24 for next appointment.   Patient discharged in stable condition accompanied by: self.  Departure Mode: Ambulatory.      Odalis Moreno RN

## 2024-09-20 LAB — CANCER AG27-29 SERPL-ACNC: 12.5 U/ML

## 2024-10-09 NOTE — PROGRESS NOTES
Virtual Visit Details    Type of service:  Video Visit   Video Start Time: 8:40 AM  Video End Time: 8:48 AM    Originating Location (pt. Location): Home  Distant Location (provider location):  Off-site  Platform used for Video Visit: SamWell    Oct 10, 2024    ONCOLOGY RETURN VISIT:     Hoda is a patient from [facility] [place] and is seen here for continuation of care for her metastatic ER+HER2- breast cancer.  She is a refugee from [facility] [place] and was initially seen in clinic with her daughter.  The only data we have from [facility] is an English translation of her records.       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  On 02/11/2014, she was seen by an oncologist.  The clinical staging of T4b N2 M1 was noted.   Her breast cancer was on the left side. There was no right breast cancer, confirmed by the patient and her daughter, correcting a possible error in the translated records.  ER was positive in 100% of the cells, MD at 14% and HER2 was 3+ positive.  It appears that this histopathologic information is on the mastectomy specimen. She underwent an MRI for staging of presumptive bone metastases which was performed 03/02/2014.  There were skeletal metastases in the thoracic vertebrae at 12, L1, L3, L5 and S1 vertebral body, ranging in size from 0.7-3.0 cm in size.  Ischial and right femur were also involved, as well as a large iliac mass measuring about 15 cm in the uterus. 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   Radiation Oncology consultation was performed 03/11/2014, and she was given radiation in 1 dose of 6 Gy to the right iliac lesion.        With the initial diagnosis, she initiated treatment with 6 cycles of CAF neoadjuvant chemotherapy 02/14, 07/07, 03/28, 04/18/14, 05/08 and 05/29/14. She also received monthly zoledronic acid.  She then underwent a modified left mastectomy of Palacios type and left lymphadenoectomy on 06/27/2014.    Pathologic examination showed number at [facility] was [id number] showed infiltrative grade 2 ductal cancer with 6 level 1 metastatic lymph nodes and 3 level 2 metastatic lymph nodes.  The differentiation was intermediate.  The tumor was ER positive 70% of the cells, FL positive in 40% of the cells and HER2 was 3+ by immunohistochemistry and the Ki-67 labeling index was 20%. Her staging after surgery was stage IV, pT4b N2 M1.  I don't see a biopsy of the skeletal metastases.  She then had continuation with chemotherapy with 4 cycles of Herceptin and taxane with monthly zoledronic acid.  Tamoxifen was initiated.  She then had two years of Herceptin and a decision was made not to continue further Herceptin.  She continued on zoledronic acid every 3 months. She was clinically stable. [place] then [place] to the [place] new [place] from [facility].  She has now been changed to letrozole.  Denosumab was held for new diagnosis of osteonecrosis.     History of cholecystectomy 2020.      TREATMENT HISTORY:  A. Initial diagnosis with metastatic breast cancer in [facility].  Neoadjuvant CAF x 6.   B. Left mastectomy. Left axillary node dissection.  C. Radiation in 1 dose to R iliac region.  C. Herceptin for 2 years taxane for a prescribed course then stopped, monthly zoledronic acid.  She had 2 years of Herceptin with tamoxifen added after chemotherapy.  D. Tamoxifen alone and zoledronic acid every 3 months starting 2014.  Letrozole was started   E. [place]  We restarted Herceptin every 3 weeks and continued tamoxifen. Bone targeted agent changed to denosumab every 9 weeks. Zometa discontinued 2017.  Tamoxifen was changed to letrozole August 2019.    F. Xgeva discontinued 12-17-19 because of dental issues.   G. J+J vaccine March, 2021.  H. Was on Zometa once May 11.  Reaction with eye lid swelling. Discontinued Zometa.  H. Restart Xgeva 6-22-21.  Continuing the trastuzumab and letrozole.  Both tolerated well.   I. Discontinue  Xgeva because of osteonecrosis of the maxilla. Continue trastuzumab and letrozole.       INTERVAL HISTORY:  Hoda is seen via video today for evaluation and toxicity check while on cancer-directed therapy with Herceptin and letrozole.   -She has no new concerns today.   -She continues on trastuzumab and letrozole and is tolerating these well.  .   -Energy level has been good, she states.   -She otherwise denies any new aches or bony pain.    -She denies fevers or chills or signs of systemic illness.    -She denies drenching night sweats or unexplained weight loss.    -She denies headache, dizziness or vision changes.    -She denies cough, chest pain, or shortness of breath at rest.    -She denies nausea, vomiting, abdominal pain or bowel changes.    -She has no new dental concerns today.  Xgeva has been discontinued for recent diagnosis of ONJ.    ROS: 12 point ROS neg other than the symptoms noted above in the HPI.    PHYSICAL EXAMINATION:    Video physical exam  General: Patient appears well in no acute distress.   Skin: No visualized rash or lesions on visualized skin  Eyes: EOMI, no erythema, sclera icterus or discharge noted  Resp: Appears to be breathing comfortably without accessory muscle usage, speaking in full sentences, no cough  MSK: Appears to have normal range of motion based on visualized movements  Neurologic: No apparent tremors, facial movements symmetric  Psych: affect bright, alert and oriented       LABORATORY DATA:   Most Recent 3 CBC's:  Recent Labs   Lab Test 09/19/24  1020 08/29/24  0831 08/08/24  1152   WBC 7.6 6.6 6.8   HGB 12.3 12.5 12.6   MCV 94 94 96    203 215   ANEUTAUTO 4.1 3.3 3.4     Most Recent 3 BMP's:  Recent Labs   Lab Test 09/19/24  1020 08/29/24  0831 08/08/24  1152    142 142   POTASSIUM 4.1 4.1 4.4   CHLORIDE 106 104 106   CO2 27 27 26   BUN 15.4 13.1 14.7   CR 0.80 0.83 0.80   ANIONGAP 10 11 10   TAYLER 9.3 9.2 9.4   GLC 88 100* 97   PROTTOTAL 7.4 7.4 7.5    ALBUMIN 4.0 3.9 3.9    Most Recent 3 LFT's:  Recent Labs   Lab Test 09/19/24  1020 08/29/24  0831 08/08/24  1152   AST 46* 44 39   ALT 46 44 41   ALKPHOS 57 55 57   BILITOTAL 0.4 0.3 0.3    Most Recent 2 TSH and T4:  Recent Labs   Lab Test 05/16/24  1307 07/06/23  1216   TSH 0.47 0.77   I reviewed the labs above.  Labs for today to be drawn at her infusion visit.    IMAGING:  Echo 9/19/2024:  Interpretation Summary     Interpretation Summary  Global and regional left ventricular function is normal with an EF of 55-60%.  Right ventricular size and function are normal.  No significant valvular abnormalities present.  No pericardial effusion is present..     ASSESSMENT AND PLAN:       Hoda Brush is a 68-year-old woman with a history of metastatic ER-positive, NE-positive, HER2 positive breast cancer to the bones.  She is from [facility], formally from [facility], and came to live in the [place] with [place].   She had metastatic disease at the time of presentation with bone-only metastases by report.  She underwent neoadjuvant CAF, had a left mastectomy, left axillary lymph node dissection, radiation to the right hip, which included the right iliac region where she had metastatic disease.  She was initially on tamoxifen but then was switched to letrozole.  She continues on this and every 3-week trastuzumab.  She has had no evidence of disease progression for the last 3 years.  Markers are low and stable, pending today.  We have continued Herceptin every 3 weeks as well as daily letrozole. CT CAP shows stable pulmonary nodules and stable sclerotic bone metastasis.   - Clinically appropriate to continue herceptin and letrozole.  Labs pending later today.    - 9/2024 echo overall stable with EF of 55-60%.  - Continue CT CAP every 4 months, due January 2025. Order placed today and scheduling informed.     Bone Metastasis   - Hx of R maxillary osteonecrosis has healed, but Dr. Aguiar placed Xgeva on hold for  now.  She is due for repeat DEXA ~July 2025.  - No report of pain today.     ALT/AST elevation  - AST and ALT mildly elevated in the past.  Labs pending today.   - Per chart review she has had intermittent elevations over the last several years.   - Most recent CT with no concerning findings; does have hepatic steatosis and is s/p cholecystectomy in 2020.   - Continue to closely monitor.    Follow up   - Continue herceptin and labs every three weeks with provider visits every 3-6 weeks.  She is scheduled to see Dr. Aguiar post-CT in January.      KATHI Valencia, CNP  [facility]  [address] [place] [address]  [phone]    22 minutes spent on the date of the encounter doing chart review, review of test results, interpretation of tests, patient visit, and documentation. The longitudinal plan of care for the diagnosis(es)/condition(s) as documented were addressed during this visit. Due to the added complexity in care, I will continue to support Hoda in the subsequent management and with ongoing continuity of care.

## 2024-10-10 ENCOUNTER — APPOINTMENT (OUTPATIENT)
Dept: LAB | Facility: CLINIC | Age: 69
End: 2024-10-10
Attending: NURSE PRACTITIONER
Payer: COMMERCIAL

## 2024-10-10 ENCOUNTER — VIRTUAL VISIT (OUTPATIENT)
Dept: ONCOLOGY | Facility: CLINIC | Age: 69
End: 2024-10-10
Attending: NURSE PRACTITIONER
Payer: COMMERCIAL

## 2024-10-10 VITALS
SYSTOLIC BLOOD PRESSURE: 108 MMHG | BODY MASS INDEX: 33.01 KG/M2 | TEMPERATURE: 97.7 F | HEART RATE: 82 BPM | WEIGHT: 180.5 LBS | DIASTOLIC BLOOD PRESSURE: 69 MMHG | OXYGEN SATURATION: 96 % | RESPIRATION RATE: 18 BRPM

## 2024-10-10 VITALS — HEIGHT: 62 IN | BODY MASS INDEX: 33.13 KG/M2 | WEIGHT: 180 LBS

## 2024-10-10 DIAGNOSIS — C79.51 MALIGNANT NEOPLASM METASTATIC TO BONE (H): ICD-10-CM

## 2024-10-10 DIAGNOSIS — C50.912 MALIGNANT NEOPLASM OF LEFT FEMALE BREAST, UNSPECIFIED ESTROGEN RECEPTOR STATUS, UNSPECIFIED SITE OF BREAST (H): Primary | ICD-10-CM

## 2024-10-10 LAB
ALBUMIN SERPL BCG-MCNC: 4 G/DL (ref 3.5–5.2)
ALP SERPL-CCNC: 62 U/L (ref 40–150)
ALT SERPL W P-5'-P-CCNC: 48 U/L (ref 0–50)
ANION GAP SERPL CALCULATED.3IONS-SCNC: 9 MMOL/L (ref 7–15)
AST SERPL W P-5'-P-CCNC: 50 U/L (ref 0–45)
BASOPHILS # BLD AUTO: 0 10E3/UL (ref 0–0.2)
BASOPHILS NFR BLD AUTO: 1 %
BILIRUB SERPL-MCNC: 0.3 MG/DL
BUN SERPL-MCNC: 11.6 MG/DL (ref 8–23)
CALCIUM SERPL-MCNC: 9.5 MG/DL (ref 8.8–10.4)
CHLORIDE SERPL-SCNC: 105 MMOL/L (ref 98–107)
CREAT SERPL-MCNC: 0.85 MG/DL (ref 0.51–0.95)
EGFRCR SERPLBLD CKD-EPI 2021: 74 ML/MIN/1.73M2
EOSINOPHIL # BLD AUTO: 0.2 10E3/UL (ref 0–0.7)
EOSINOPHIL NFR BLD AUTO: 3 %
ERYTHROCYTE [DISTWIDTH] IN BLOOD BY AUTOMATED COUNT: 13.2 % (ref 10–15)
GLUCOSE SERPL-MCNC: 84 MG/DL (ref 70–99)
HCO3 SERPL-SCNC: 27 MMOL/L (ref 22–29)
HCT VFR BLD AUTO: 40.4 % (ref 35–47)
HGB BLD-MCNC: 13 G/DL (ref 11.7–15.7)
IMM GRANULOCYTES # BLD: 0 10E3/UL
IMM GRANULOCYTES NFR BLD: 0 %
LYMPHOCYTES # BLD AUTO: 3.1 10E3/UL (ref 0.8–5.3)
LYMPHOCYTES NFR BLD AUTO: 43 %
MCH RBC QN AUTO: 29.7 PG (ref 26.5–33)
MCHC RBC AUTO-ENTMCNC: 32.2 G/DL (ref 31.5–36.5)
MCV RBC AUTO: 92 FL (ref 78–100)
MONOCYTES # BLD AUTO: 0.5 10E3/UL (ref 0–1.3)
MONOCYTES NFR BLD AUTO: 7 %
NEUTROPHILS # BLD AUTO: 3.3 10E3/UL (ref 1.6–8.3)
NEUTROPHILS NFR BLD AUTO: 47 %
NRBC # BLD AUTO: 0 10E3/UL
NRBC BLD AUTO-RTO: 0 /100
PLATELET # BLD AUTO: 214 10E3/UL (ref 150–450)
POTASSIUM SERPL-SCNC: 4.4 MMOL/L (ref 3.4–5.3)
PROT SERPL-MCNC: 7.5 G/DL (ref 6.4–8.3)
RBC # BLD AUTO: 4.37 10E6/UL (ref 3.8–5.2)
SODIUM SERPL-SCNC: 141 MMOL/L (ref 135–145)
WBC # BLD AUTO: 7.1 10E3/UL (ref 4–11)

## 2024-10-10 PROCEDURE — 99215 OFFICE O/P EST HI 40 MIN: CPT | Mod: 95 | Performed by: NURSE PRACTITIONER

## 2024-10-10 PROCEDURE — 86300 IMMUNOASSAY TUMOR CA 15-3: CPT | Performed by: INTERNAL MEDICINE

## 2024-10-10 PROCEDURE — 80053 COMPREHEN METABOLIC PANEL: CPT | Performed by: INTERNAL MEDICINE

## 2024-10-10 PROCEDURE — 82378 CARCINOEMBRYONIC ANTIGEN: CPT

## 2024-10-10 PROCEDURE — 250N000011 HC RX IP 250 OP 636: Performed by: INTERNAL MEDICINE

## 2024-10-10 PROCEDURE — 36591 DRAW BLOOD OFF VENOUS DEVICE: CPT | Performed by: INTERNAL MEDICINE

## 2024-10-10 PROCEDURE — 258N000003 HC RX IP 258 OP 636: Performed by: INTERNAL MEDICINE

## 2024-10-10 PROCEDURE — 96413 CHEMO IV INFUSION 1 HR: CPT

## 2024-10-10 PROCEDURE — G2211 COMPLEX E/M VISIT ADD ON: HCPCS | Mod: 95 | Performed by: NURSE PRACTITIONER

## 2024-10-10 PROCEDURE — 85004 AUTOMATED DIFF WBC COUNT: CPT | Performed by: INTERNAL MEDICINE

## 2024-10-10 RX ORDER — HEPARIN SODIUM (PORCINE) LOCK FLUSH IV SOLN 100 UNIT/ML 100 UNIT/ML
5 SOLUTION INTRAVENOUS ONCE
Status: COMPLETED | OUTPATIENT
Start: 2024-10-10 | End: 2024-10-10

## 2024-10-10 RX ORDER — HEPARIN SODIUM (PORCINE) LOCK FLUSH IV SOLN 100 UNIT/ML 100 UNIT/ML
5 SOLUTION INTRAVENOUS EVERY 8 HOURS
Status: DISCONTINUED | OUTPATIENT
Start: 2024-10-10 | End: 2024-10-10 | Stop reason: HOSPADM

## 2024-10-10 RX ADMIN — Medication 5 ML: at 12:33

## 2024-10-10 RX ADMIN — Medication 5 ML: at 13:53

## 2024-10-10 RX ADMIN — TRASTUZUMAB 450 MG: 150 INJECTION, POWDER, LYOPHILIZED, FOR SOLUTION INTRAVENOUS at 13:20

## 2024-10-10 ASSESSMENT — PAIN SCALES - GENERAL
PAINLEVEL: NO PAIN (0)
PAINLEVEL: NO PAIN (0)

## 2024-10-10 NOTE — PATIENT INSTRUCTIONS
[facility] Triage and after hours / weekends / holidays:  [phone]    Please call the triage or after hours line if you experience a temperature greater than or equal to 100.4, shaking chills, have uncontrolled nausea, vomiting and/or diarrhea, dizziness, shortness of breath, chest pain, bleeding, unexplained bruising, or if you have any other new/concerning symptoms, questions or concerns.      If you are having any concerning symptoms or wish to speak to a provider before your next infusion visit, please call triage to notify them so we can adequately serve you.     If you need a refill on a narcotic prescription or other medication, please call before your infusion appointment.                 October 2024 Sunday Monday Tuesday Wednesday Thursday Friday Saturday             1     2     3     4     5       6     7     8     9     10    RETURN CCSL   8:15 AM   (45 min.)   Kassidy Robles APRN CNP   [facility]    LAB CENTRAL  12:00 PM   (15 min.)   [facility] DRAW   [facility]    ONC INFUSION 1 HR (60 MIN)  12:30 PM   (60 min.)    ONC INFUSION NURSE   [facility] 11     12       13     14     15     16     17     18     19       20     21     22     23     24     25     26       27     28     29     30     31    LAB CENTRAL  10:30 AM   (15 min.)   [facility] DRAW   [facility]    ONC INFUSION 1 HR (60 MIN)  11:00 AM   (60 min.)    ONC INFUSION NURSE   [facility]                       November 2024 Sunday Monday Tuesday Wednesday Thursday Friday Saturday                            1     2       3     4     5     6     7     8     9       10     11     12     13     14     15     16       17     18     19     20     21    RETURN CCSL   8:15 AM   (45 min.)   Kassidy Robles APRN CNP   [facility]    LAB  CENTRAL  12:00 PM   (15 min.)   [facility] LAB DRAW   [facility]    ONC INFUSION 1 HR (60 MIN)  12:30 PM   (60 min.)    ONC INFUSION NURSE   [facility] 22     23       24     25     26     27     28     29     30                     Lab Results:  Recent Results (from the past 12 hour(s))   Comprehensive metabolic panel    Collection Time: 10/10/24 12:33 PM   Result Value Ref Range    Sodium 141 135 - 145 mmol/L    Potassium 4.4 3.4 - 5.3 mmol/L    Carbon Dioxide (CO2) 27 22 - 29 mmol/L    Anion Gap 9 7 - 15 mmol/L    Urea Nitrogen 11.6 8.0 - 23.0 mg/dL    Creatinine 0.85 0.51 - 0.95 mg/dL    GFR Estimate 74 >60 mL/min/1.73m2    Calcium 9.5 8.8 - 10.4 mg/dL    Chloride 105 98 - 107 mmol/L    Glucose 84 70 - 99 mg/dL    Alkaline Phosphatase 62 40 - 150 U/L    AST 50 (H) 0 - 45 U/L    ALT 48 0 - 50 U/L    Protein Total 7.5 6.4 - 8.3 g/dL    Albumin 4.0 3.5 - 5.2 g/dL    Bilirubin Total 0.3 <=1.2 mg/dL   CBC with platelets and differential    Collection Time: 10/10/24 12:33 PM   Result Value Ref Range    WBC Count 7.1 4.0 - 11.0 10e3/uL    RBC Count 4.37 3.80 - 5.20 10e6/uL    Hemoglobin 13.0 11.7 - 15.7 g/dL    Hematocrit 40.4 35.0 - 47.0 %    MCV 92 78 - 100 fL    MCH 29.7 26.5 - 33.0 pg    MCHC 32.2 31.5 - 36.5 g/dL    RDW 13.2 10.0 - 15.0 %    Platelet Count 214 150 - 450 10e3/uL    % Neutrophils 47 %    % Lymphocytes 43 %    % Monocytes 7 %    % Eosinophils 3 %    % Basophils 1 %    % Immature Granulocytes 0 %    NRBCs per 100 WBC 0 <1 /100    Absolute Neutrophils 3.3 1.6 - 8.3 10e3/uL    Absolute Lymphocytes 3.1 0.8 - 5.3 10e3/uL    Absolute Monocytes 0.5 0.0 - 1.3 10e3/uL    Absolute Eosinophils 0.2 0.0 - 0.7 10e3/uL    Absolute Basophils 0.0 0.0 - 0.2 10e3/uL    Absolute Immature Granulocytes 0.0 <=0.4 10e3/uL    Absolute NRBCs 0.0 10e3/uL

## 2024-10-10 NOTE — PROGRESS NOTES
Infusion Nursing Note:  Hoda Brush presents today for Day 1 Cycle 121 Herceptin.    Patient seen by provider today: Yes: Kassidy Robles CNP   present during visit today: Not Applicable.    Note: Patient presents to infusion feeling well. Pt denies new acute discomfort and states no acute complaints or concerns not addressed by KARISSA during virtual visit.    Confirmed with Kassidy KARISSA that Xgeva is to continue to be on hold.      Intravenous Access:  Implanted Port.    Treatment Conditions:  Lab Results   Component Value Date    HGB 13.0 10/10/2024    WBC 7.1 10/10/2024    ANEU 3.1 06/22/2021    ANEUTAUTO 3.3 10/10/2024     10/10/2024        Lab Results   Component Value Date     10/10/2024    POTASSIUM 4.4 10/10/2024    CR 0.85 10/10/2024    TAYLER 9.5 10/10/2024    BILITOTAL 0.3 10/10/2024    ALBUMIN 4.0 10/10/2024    ALT 48 10/10/2024    AST 50 (H) 10/10/2024   Results reviewed, labs MET treatment parameters, ok to proceed with treatment.  ECHO/MUGA completed 9/19/24  EF 55-60%.      Post Infusion Assessment:  Patient tolerated infusion without incident.  Blood return noted pre and post infusion.  Site patent and intact, free from redness, edema or discomfort.  No evidence of extravasations.  Access discontinued per protocol.       Discharge Plan:   Patient declined prescription refills.  Discharge instructions reviewed with: Patient.  Patient and/or family verbalized understanding of discharge instructions and all questions answered.  AVS to patient via [place].  Patient will return 10/31 for next appointment.   Patient discharged in stable condition accompanied by: self.  Departure Mode: Ambulatory.      Prakash Benjamin RN

## 2024-10-10 NOTE — NURSING NOTE
"Chief Complaint   Patient presents with    Port Draw     Labs drawn via port by RN. Port accessed with 20g 3/4\" power needle and vitals WNL. Flushed with saline and heparin. Pt tolerated well. Patient checked into next appointment.       Marycarmen Magana RN    "

## 2024-10-10 NOTE — NURSING NOTE
Current patient location: [address] [place] [address]   [facility] [address]    Is the patient currently in the state of [place]? YES    Visit mode:VIDEO    If the visit is dropped, the patient can be reconnected by: VIDEO VISIT: Text to cell phone:   Telephone Information:   Mobile [phone]       Will anyone else be joining the visit? NO  (If patient encounters technical issues they should call [phone]    Are changes needed to the allergy or medication list? Pt stated no changes to allergies and Pt stated no med changes    Are refills needed on medications prescribed by this physician? NO    Rooming Documentation:  Questionnaire(s) completed    Reason for visit: BERLIN VASQUEZF

## 2024-10-10 NOTE — LETTER
10/10/2024      Hoda Brush  [address]  [facility] [address]      Dear Colleague,    Thank you for referring your patient, Hoda Brush, to the [facility]. Please see a copy of my visit note below.    Virtual Visit Details    Type of service:  Video Visit   Video Start Time: 8:40 AM  Video End Time: 8:48 AM    Originating Location (pt. Location): Home  Distant Location (provider location):  Off-site  Platform used for Video Visit: Nidia    Oct 10, 2024    ONCOLOGY RETURN VISIT:     Hoda is a patient from [facility] and is seen here for continuation of care for her metastatic ER+HER2- breast cancer.  She is a refugee from [facility] and was initially seen in clinic with her daughter.  The only data we have from [facility] is an English translation of her records.       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  On 02/11/2014, she was seen by an oncologist.  The clinical staging of T4b N2 M1 was noted.   Her breast cancer was on the left side. There was no right breast cancer, confirmed by the patient and her daughter, correcting a possible error in the translated records.  ER was positive in 100% of the cells, ND at 14% and HER2 was 3+ positive.  It appears that this histopathologic information is on the mastectomy specimen. She underwent an MRI for staging of presumptive bone metastases which was performed 03/02/2014.  There were skeletal metastases in the thoracic vertebrae at 12, L1, L3, L5 and S1 vertebral body, ranging in size from 0.7-3.0 cm in size.  Ischial and right femur were also involved, as well as a large iliac mass measuring about 15 cm in the uterus. 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   Radiation Oncology consultation was performed 03/11/2014, and she was given radiation in 1 dose of 6 Gy to the right iliac lesion.        With the initial diagnosis, she initiated  treatment with 6 cycles of CAF neoadjuvant chemotherapy 02/14, 07/07, 03/28, 04/18/14, 05/08 and 05/29/14. She also received monthly zoledronic acid.  She then underwent a modified left mastectomy of Palacios type and left lymphadenoectomy on 06/27/2014.   Pathologic examination showed number at [facility] was [id number] showed infiltrative grade 2 ductal cancer with 6 level 1 metastatic lymph nodes and 3 level 2 metastatic lymph nodes.  The differentiation was intermediate.  The tumor was ER positive 70% of the cells, KY positive in 40% of the cells and HER2 was 3+ by immunohistochemistry and the Ki-67 labeling index was 20%. Her staging after surgery was stage IV, pT4b N2 M1.  I don't see a biopsy of the skeletal metastases.  She then had continuation with chemotherapy with 4 cycles of Herceptin and taxane with monthly zoledronic acid.  Tamoxifen was initiated.  She then had two years of Herceptin and a decision was made not to continue further Herceptin.  She continued on zoledronic acid every 3 months. She was clinically stable. [place] then [place] to the [place] new [place] from [facility].  She has now been changed to letrozole.  Denosumab was held for new diagnosis of osteonecrosis.     History of cholecystectomy 2020.      TREATMENT HISTORY:  A. Initial diagnosis with metastatic breast cancer in [facility].  Neoadjuvant CAF x 6.   B. Left mastectomy. Left axillary node dissection.  C. Radiation in 1 dose to R iliac region.  C. Herceptin for 2 years taxane for a prescribed course then stopped, monthly zoledronic acid.  She had 2 years of Herceptin with tamoxifen added after chemotherapy.  D. Tamoxifen alone and zoledronic acid every 3 months starting 2014.  Letrozole was started   E. [place]  We restarted Herceptin every 3 weeks and continued tamoxifen. Bone targeted agent changed to denosumab every 9 weeks. Zometa discontinued 2017.  Tamoxifen was changed to letrozole August 2019.    F. Xgeva discontinued  12-17-19 because of dental issues.   G. J+J vaccine March, 2021.  H. Was on Zometa once May 11.  Reaction with eye lid swelling. Discontinued Zometa.  H. Restart Xgeva 6-22-21.  Continuing the trastuzumab and letrozole.  Both tolerated well.   I. Discontinue Xgeva because of osteonecrosis of the maxilla. Continue trastuzumab and letrozole.       INTERVAL HISTORY:  Hoda is seen via video today for evaluation and toxicity check while on cancer-directed therapy with Herceptin and letrozole.   -She has no new concerns today.   -She continues on trastuzumab and letrozole and is tolerating these well.  .   -Energy level has been good, she states.   -She otherwise denies any new aches or bony pain.    -She denies fevers or chills or signs of systemic illness.    -She denies drenching night sweats or unexplained weight loss.    -She denies headache, dizziness or vision changes.    -She denies cough, chest pain, or shortness of breath at rest.    -She denies nausea, vomiting, abdominal pain or bowel changes.    -She has no new dental concerns today.  Xgeva has been discontinued for recent diagnosis of ONJ.    ROS: 12 point ROS neg other than the symptoms noted above in the HPI.    PHYSICAL EXAMINATION:    Video physical exam  General: Patient appears well in no acute distress.   Skin: No visualized rash or lesions on visualized skin  Eyes: EOMI, no erythema, sclera icterus or discharge noted  Resp: Appears to be breathing comfortably without accessory muscle usage, speaking in full sentences, no cough  MSK: Appears to have normal range of motion based on visualized movements  Neurologic: No apparent tremors, facial movements symmetric  Psych: affect bright, alert and oriented       LABORATORY DATA:   Most Recent 3 CBC's:  Recent Labs   Lab Test 09/19/24  1020 08/29/24  0831 08/08/24  1152   WBC 7.6 6.6 6.8   HGB 12.3 12.5 12.6   MCV 94 94 96    203 215   ANEUTAUTO 4.1 3.3 3.4     Most Recent 3 BMP's:  Recent Labs    Lab Test 09/19/24  1020 08/29/24  0831 08/08/24  1152    142 142   POTASSIUM 4.1 4.1 4.4   CHLORIDE 106 104 106   CO2 27 27 26   BUN 15.4 13.1 14.7   CR 0.80 0.83 0.80   ANIONGAP 10 11 10   TAYLER 9.3 9.2 9.4   GLC 88 100* 97   PROTTOTAL 7.4 7.4 7.5   ALBUMIN 4.0 3.9 3.9    Most Recent 3 LFT's:  Recent Labs   Lab Test 09/19/24  1020 08/29/24  0831 08/08/24  1152   AST 46* 44 39   ALT 46 44 41   ALKPHOS 57 55 57   BILITOTAL 0.4 0.3 0.3    Most Recent 2 TSH and T4:  Recent Labs   Lab Test 05/16/24  1307 07/06/23  1216   TSH 0.47 0.77   I reviewed the labs above.  Labs for today to be drawn at her infusion visit.    IMAGING:  Echo 9/19/2024:  Interpretation Summary     Interpretation Summary  Global and regional left ventricular function is normal with an EF of 55-60%.  Right ventricular size and function are normal.  No significant valvular abnormalities present.  No pericardial effusion is present..     ASSESSMENT AND PLAN:       Hoda Brush is a 68-year-old woman with a history of metastatic ER-positive, HI-positive, HER2 positive breast cancer to the bones.  She is from [facility], formally from [place], and came to live in the [place] with her daughter.   She had metastatic disease at the time of presentation with bone-only metastases by report.  She underwent neoadjuvant CAF, had a left mastectomy, left axillary lymph node dissection, radiation to the right hip, which included the right iliac region where she had metastatic disease.  She was initially on tamoxifen but then was switched to letrozole.  She continues on this and every 3-week trastuzumab.  She has had no evidence of disease progression for the last 3 years.  Markers are low and stable, pending today.  We have continued Herceptin every 3 weeks as well as daily letrozole. CT CAP shows stable pulmonary nodules and stable sclerotic bone metastasis.   - Clinically appropriate to continue herceptin and letrozole.  Labs pending later today.     - 9/2024 echo overall stable with EF of 55-60%.  - Continue CT CAP every 4 months, due January 2025. Order placed today and scheduling informed.     Bone Metastasis   - Hx of R maxillary osteonecrosis has healed, but Dr. Aguiar placed Xgeva on hold for now.  She is due for repeat DEXA ~July 2025.  - No report of pain today.     ALT/AST elevation  - AST and ALT mildly elevated in the past.  Labs pending today.   - Per chart review she has had intermittent elevations over the last several years.   - Most recent CT with no concerning findings; does have hepatic steatosis and is s/p cholecystectomy in 2020.   - Continue to closely monitor.    Follow up   - Continue herceptin and labs every three weeks with provider visits every 3-6 weeks.  She is scheduled to see Dr. Aguiar post-CT in January.      KATHI Valencia, CNP  [facility] [place] [id number]  [phone]    22 minutes spent on the date of the encounter doing chart review, review of test results, interpretation of tests, patient visit, and documentation. The longitudinal plan of care for the diagnosis(es)/condition(s) as documented were addressed during this visit. Due to the added complexity in care, I will continue to support Hoda in the subsequent management and with ongoing continuity of care.             Again, thank you for allowing me to participate in the care of your patient.        Sincerely,        KATHI Mendoza CNP

## 2024-10-11 LAB
CANCER AG27-29 SERPL-ACNC: <9 U/ML
CEA SERPL-MCNC: 1.2 NG/ML

## 2024-10-28 DIAGNOSIS — C50.912 MALIGNANT NEOPLASM OF LEFT FEMALE BREAST, UNSPECIFIED ESTROGEN RECEPTOR STATUS, UNSPECIFIED SITE OF BREAST (H): Primary | ICD-10-CM

## 2024-10-28 RX ORDER — METHYLPREDNISOLONE SODIUM SUCCINATE 40 MG/ML
40 INJECTION INTRAMUSCULAR; INTRAVENOUS
Start: 2025-01-23

## 2024-10-28 RX ORDER — METHYLPREDNISOLONE SODIUM SUCCINATE 40 MG/ML
40 INJECTION INTRAMUSCULAR; INTRAVENOUS
Start: 2025-03-06

## 2024-10-28 RX ORDER — ALBUTEROL SULFATE 0.83 MG/ML
2.5 SOLUTION RESPIRATORY (INHALATION)
OUTPATIENT
Start: 2025-01-23

## 2024-10-28 RX ORDER — ALBUTEROL SULFATE 0.83 MG/ML
2.5 SOLUTION RESPIRATORY (INHALATION)
OUTPATIENT
Start: 2025-01-02

## 2024-10-28 RX ORDER — ALBUTEROL SULFATE 90 UG/1
1-2 INHALANT RESPIRATORY (INHALATION)
Start: 2024-12-12

## 2024-10-28 RX ORDER — DIPHENHYDRAMINE HCL 25 MG
50 CAPSULE ORAL ONCE
Start: 2025-03-06

## 2024-10-28 RX ORDER — DIPHENHYDRAMINE HYDROCHLORIDE 50 MG/ML
50 INJECTION INTRAMUSCULAR; INTRAVENOUS
Start: 2025-01-02

## 2024-10-28 RX ORDER — LORAZEPAM 2 MG/ML
0.5 INJECTION INTRAMUSCULAR EVERY 4 HOURS PRN
Start: 2025-02-13

## 2024-10-28 RX ORDER — LORAZEPAM 2 MG/ML
0.5 INJECTION INTRAMUSCULAR EVERY 4 HOURS PRN
Start: 2025-03-06

## 2024-10-28 RX ORDER — ALBUTEROL SULFATE 90 UG/1
1-2 INHALANT RESPIRATORY (INHALATION)
Start: 2025-03-06

## 2024-10-28 RX ORDER — METHYLPREDNISOLONE SODIUM SUCCINATE 40 MG/ML
40 INJECTION INTRAMUSCULAR; INTRAVENOUS
Start: 2025-02-13

## 2024-10-28 RX ORDER — MEPERIDINE HYDROCHLORIDE 25 MG/ML
25 INJECTION INTRAMUSCULAR; INTRAVENOUS; SUBCUTANEOUS
OUTPATIENT
Start: 2025-01-23

## 2024-10-28 RX ORDER — DIPHENHYDRAMINE HYDROCHLORIDE 50 MG/ML
50 INJECTION INTRAMUSCULAR; INTRAVENOUS
Status: CANCELLED
Start: 2024-10-31

## 2024-10-28 RX ORDER — MEPERIDINE HYDROCHLORIDE 25 MG/ML
25 INJECTION INTRAMUSCULAR; INTRAVENOUS; SUBCUTANEOUS
OUTPATIENT
Start: 2025-03-06

## 2024-10-28 RX ORDER — ALBUTEROL SULFATE 90 UG/1
1-2 INHALANT RESPIRATORY (INHALATION)
Start: 2024-11-21

## 2024-10-28 RX ORDER — HEPARIN SODIUM (PORCINE) LOCK FLUSH IV SOLN 100 UNIT/ML 100 UNIT/ML
5 SOLUTION INTRAVENOUS EVERY 8 HOURS
OUTPATIENT
Start: 2025-01-23

## 2024-10-28 RX ORDER — EPINEPHRINE 1 MG/ML
0.3 INJECTION, SOLUTION INTRAMUSCULAR; SUBCUTANEOUS EVERY 5 MIN PRN
OUTPATIENT
Start: 2025-01-02

## 2024-10-28 RX ORDER — ALBUTEROL SULFATE 90 UG/1
1-2 INHALANT RESPIRATORY (INHALATION)
Start: 2025-01-02

## 2024-10-28 RX ORDER — EPINEPHRINE 1 MG/ML
0.3 INJECTION, SOLUTION INTRAMUSCULAR; SUBCUTANEOUS EVERY 5 MIN PRN
OUTPATIENT
Start: 2025-01-23

## 2024-10-28 RX ORDER — DIPHENHYDRAMINE HYDROCHLORIDE 50 MG/ML
25 INJECTION INTRAMUSCULAR; INTRAVENOUS
Start: 2025-03-06

## 2024-10-28 RX ORDER — DIPHENHYDRAMINE HYDROCHLORIDE 50 MG/ML
25 INJECTION INTRAMUSCULAR; INTRAVENOUS
Start: 2024-12-12

## 2024-10-28 RX ORDER — EPINEPHRINE 1 MG/ML
0.3 INJECTION, SOLUTION INTRAMUSCULAR; SUBCUTANEOUS EVERY 5 MIN PRN
OUTPATIENT
Start: 2024-12-12

## 2024-10-28 RX ORDER — EPINEPHRINE 1 MG/ML
0.3 INJECTION, SOLUTION INTRAMUSCULAR; SUBCUTANEOUS EVERY 5 MIN PRN
OUTPATIENT
Start: 2025-03-06

## 2024-10-28 RX ORDER — DIPHENHYDRAMINE HYDROCHLORIDE 50 MG/ML
50 INJECTION INTRAMUSCULAR; INTRAVENOUS
Start: 2024-12-12

## 2024-10-28 RX ORDER — ALBUTEROL SULFATE 90 UG/1
1-2 INHALANT RESPIRATORY (INHALATION)
Status: CANCELLED
Start: 2024-10-31

## 2024-10-28 RX ORDER — ACETAMINOPHEN 325 MG/1
650 TABLET ORAL
OUTPATIENT
Start: 2025-01-23

## 2024-10-28 RX ORDER — METHYLPREDNISOLONE SODIUM SUCCINATE 40 MG/ML
40 INJECTION INTRAMUSCULAR; INTRAVENOUS
Status: CANCELLED
Start: 2024-10-31

## 2024-10-28 RX ORDER — METHYLPREDNISOLONE SODIUM SUCCINATE 40 MG/ML
40 INJECTION INTRAMUSCULAR; INTRAVENOUS
Start: 2024-11-21

## 2024-10-28 RX ORDER — ALBUTEROL SULFATE 90 UG/1
1-2 INHALANT RESPIRATORY (INHALATION)
Start: 2025-01-23

## 2024-10-28 RX ORDER — ALBUTEROL SULFATE 90 UG/1
1-2 INHALANT RESPIRATORY (INHALATION)
Start: 2025-02-13

## 2024-10-28 RX ORDER — DIPHENHYDRAMINE HYDROCHLORIDE 50 MG/ML
50 INJECTION INTRAMUSCULAR; INTRAVENOUS
Start: 2025-01-23

## 2024-10-28 RX ORDER — DIPHENHYDRAMINE HYDROCHLORIDE 50 MG/ML
50 INJECTION INTRAMUSCULAR; INTRAVENOUS
Start: 2025-03-06

## 2024-10-28 RX ORDER — DIPHENHYDRAMINE HYDROCHLORIDE 50 MG/ML
25 INJECTION INTRAMUSCULAR; INTRAVENOUS
Start: 2024-11-21

## 2024-10-28 RX ORDER — HEPARIN SODIUM (PORCINE) LOCK FLUSH IV SOLN 100 UNIT/ML 100 UNIT/ML
5 SOLUTION INTRAVENOUS EVERY 8 HOURS
OUTPATIENT
Start: 2025-03-06

## 2024-10-28 RX ORDER — EPINEPHRINE 1 MG/ML
0.3 INJECTION, SOLUTION INTRAMUSCULAR; SUBCUTANEOUS EVERY 5 MIN PRN
Status: CANCELLED | OUTPATIENT
Start: 2024-10-31

## 2024-10-28 RX ORDER — ALBUTEROL SULFATE 0.83 MG/ML
2.5 SOLUTION RESPIRATORY (INHALATION)
OUTPATIENT
Start: 2024-11-21

## 2024-10-28 RX ORDER — ALBUTEROL SULFATE 0.83 MG/ML
2.5 SOLUTION RESPIRATORY (INHALATION)
OUTPATIENT
Start: 2024-12-12

## 2024-10-28 RX ORDER — DIPHENHYDRAMINE HYDROCHLORIDE 50 MG/ML
50 INJECTION INTRAMUSCULAR; INTRAVENOUS
Start: 2025-02-13

## 2024-10-28 RX ORDER — MEPERIDINE HYDROCHLORIDE 25 MG/ML
25 INJECTION INTRAMUSCULAR; INTRAVENOUS; SUBCUTANEOUS
OUTPATIENT
Start: 2025-01-02

## 2024-10-28 RX ORDER — ACETAMINOPHEN 325 MG/1
650 TABLET ORAL
OUTPATIENT
Start: 2025-02-13

## 2024-10-28 RX ORDER — DIPHENHYDRAMINE HYDROCHLORIDE 50 MG/ML
25 INJECTION INTRAMUSCULAR; INTRAVENOUS
Status: CANCELLED
Start: 2024-10-31

## 2024-10-28 RX ORDER — MEPERIDINE HYDROCHLORIDE 25 MG/ML
25 INJECTION INTRAMUSCULAR; INTRAVENOUS; SUBCUTANEOUS
OUTPATIENT
Start: 2024-11-21

## 2024-10-28 RX ORDER — DIPHENHYDRAMINE HYDROCHLORIDE 50 MG/ML
25 INJECTION INTRAMUSCULAR; INTRAVENOUS
Start: 2025-01-23

## 2024-10-28 RX ORDER — MEPERIDINE HYDROCHLORIDE 25 MG/ML
25 INJECTION INTRAMUSCULAR; INTRAVENOUS; SUBCUTANEOUS
OUTPATIENT
Start: 2025-02-13

## 2024-10-28 RX ORDER — METHYLPREDNISOLONE SODIUM SUCCINATE 40 MG/ML
40 INJECTION INTRAMUSCULAR; INTRAVENOUS
Start: 2024-12-12

## 2024-10-28 RX ORDER — DIPHENHYDRAMINE HCL 25 MG
50 CAPSULE ORAL ONCE
Start: 2025-01-23

## 2024-10-28 RX ORDER — ALBUTEROL SULFATE 0.83 MG/ML
2.5 SOLUTION RESPIRATORY (INHALATION)
Status: CANCELLED | OUTPATIENT
Start: 2024-10-31

## 2024-10-28 RX ORDER — MEPERIDINE HYDROCHLORIDE 25 MG/ML
25 INJECTION INTRAMUSCULAR; INTRAVENOUS; SUBCUTANEOUS
Status: CANCELLED | OUTPATIENT
Start: 2024-10-31

## 2024-10-28 RX ORDER — EPINEPHRINE 1 MG/ML
0.3 INJECTION, SOLUTION INTRAMUSCULAR; SUBCUTANEOUS EVERY 5 MIN PRN
OUTPATIENT
Start: 2025-02-13

## 2024-10-28 RX ORDER — DIPHENHYDRAMINE HYDROCHLORIDE 50 MG/ML
25 INJECTION INTRAMUSCULAR; INTRAVENOUS
Start: 2025-01-02

## 2024-10-28 RX ORDER — ALBUTEROL SULFATE 0.83 MG/ML
2.5 SOLUTION RESPIRATORY (INHALATION)
OUTPATIENT
Start: 2025-03-06

## 2024-10-28 RX ORDER — DIPHENHYDRAMINE HYDROCHLORIDE 50 MG/ML
50 INJECTION INTRAMUSCULAR; INTRAVENOUS
Start: 2024-11-21

## 2024-10-28 RX ORDER — ACETAMINOPHEN 325 MG/1
650 TABLET ORAL
OUTPATIENT
Start: 2025-03-06

## 2024-10-28 RX ORDER — MEPERIDINE HYDROCHLORIDE 25 MG/ML
25 INJECTION INTRAMUSCULAR; INTRAVENOUS; SUBCUTANEOUS
OUTPATIENT
Start: 2024-12-12

## 2024-10-28 RX ORDER — METHYLPREDNISOLONE SODIUM SUCCINATE 40 MG/ML
40 INJECTION INTRAMUSCULAR; INTRAVENOUS
Start: 2025-01-02

## 2024-10-28 RX ORDER — EPINEPHRINE 1 MG/ML
0.3 INJECTION, SOLUTION INTRAMUSCULAR; SUBCUTANEOUS EVERY 5 MIN PRN
OUTPATIENT
Start: 2024-11-21

## 2024-10-28 RX ORDER — HEPARIN SODIUM (PORCINE) LOCK FLUSH IV SOLN 100 UNIT/ML 100 UNIT/ML
5 SOLUTION INTRAVENOUS EVERY 8 HOURS
OUTPATIENT
Start: 2025-02-13

## 2024-10-28 RX ORDER — ALBUTEROL SULFATE 0.83 MG/ML
2.5 SOLUTION RESPIRATORY (INHALATION)
OUTPATIENT
Start: 2025-02-13

## 2024-10-28 RX ORDER — LORAZEPAM 2 MG/ML
0.5 INJECTION INTRAMUSCULAR EVERY 4 HOURS PRN
Start: 2025-01-23

## 2024-10-28 RX ORDER — DIPHENHYDRAMINE HCL 25 MG
50 CAPSULE ORAL ONCE
Start: 2025-02-13

## 2024-10-28 RX ORDER — DIPHENHYDRAMINE HYDROCHLORIDE 50 MG/ML
25 INJECTION INTRAMUSCULAR; INTRAVENOUS
Start: 2025-02-13

## 2024-10-31 ENCOUNTER — APPOINTMENT (OUTPATIENT)
Dept: LAB | Facility: CLINIC | Age: 69
End: 2024-10-31
Attending: INTERNAL MEDICINE
Payer: COMMERCIAL

## 2024-10-31 ENCOUNTER — INFUSION THERAPY VISIT (OUTPATIENT)
Dept: ONCOLOGY | Facility: CLINIC | Age: 69
End: 2024-10-31
Attending: INTERNAL MEDICINE
Payer: COMMERCIAL

## 2024-10-31 VITALS
WEIGHT: 179.7 LBS | DIASTOLIC BLOOD PRESSURE: 73 MMHG | TEMPERATURE: 98.5 F | SYSTOLIC BLOOD PRESSURE: 113 MMHG | BODY MASS INDEX: 32.87 KG/M2 | OXYGEN SATURATION: 96 % | HEART RATE: 79 BPM | RESPIRATION RATE: 16 BRPM

## 2024-10-31 DIAGNOSIS — C50.912 MALIGNANT NEOPLASM OF LEFT FEMALE BREAST, UNSPECIFIED ESTROGEN RECEPTOR STATUS, UNSPECIFIED SITE OF BREAST (H): Primary | ICD-10-CM

## 2024-10-31 LAB
ALBUMIN SERPL BCG-MCNC: 3.9 G/DL (ref 3.5–5.2)
ALP SERPL-CCNC: 61 U/L (ref 40–150)
ALT SERPL W P-5'-P-CCNC: 45 U/L (ref 0–50)
ANION GAP SERPL CALCULATED.3IONS-SCNC: 13 MMOL/L (ref 7–15)
AST SERPL W P-5'-P-CCNC: 49 U/L (ref 0–45)
BASOPHILS # BLD AUTO: 0 10E3/UL (ref 0–0.2)
BASOPHILS NFR BLD AUTO: 0 %
BILIRUB SERPL-MCNC: 0.3 MG/DL
BUN SERPL-MCNC: 13.5 MG/DL (ref 8–23)
CALCIUM SERPL-MCNC: 9.4 MG/DL (ref 8.8–10.4)
CEA SERPL-MCNC: 1.1 NG/ML
CHLORIDE SERPL-SCNC: 104 MMOL/L (ref 98–107)
CREAT SERPL-MCNC: 0.83 MG/DL (ref 0.51–0.95)
EGFRCR SERPLBLD CKD-EPI 2021: 76 ML/MIN/1.73M2
EOSINOPHIL # BLD AUTO: 0.2 10E3/UL (ref 0–0.7)
EOSINOPHIL NFR BLD AUTO: 3 %
ERYTHROCYTE [DISTWIDTH] IN BLOOD BY AUTOMATED COUNT: 13.5 % (ref 10–15)
GLUCOSE SERPL-MCNC: 88 MG/DL (ref 70–99)
HCO3 SERPL-SCNC: 28 MMOL/L (ref 22–29)
HCT VFR BLD AUTO: 39.4 % (ref 35–47)
HGB BLD-MCNC: 12.2 G/DL (ref 11.7–15.7)
IMM GRANULOCYTES # BLD: 0 10E3/UL
IMM GRANULOCYTES NFR BLD: 0 %
LYMPHOCYTES # BLD AUTO: 2.9 10E3/UL (ref 0.8–5.3)
LYMPHOCYTES NFR BLD AUTO: 40 %
MCH RBC QN AUTO: 29.1 PG (ref 26.5–33)
MCHC RBC AUTO-ENTMCNC: 31 G/DL (ref 31.5–36.5)
MCV RBC AUTO: 94 FL (ref 78–100)
MONOCYTES # BLD AUTO: 0.6 10E3/UL (ref 0–1.3)
MONOCYTES NFR BLD AUTO: 8 %
NEUTROPHILS # BLD AUTO: 3.6 10E3/UL (ref 1.6–8.3)
NEUTROPHILS NFR BLD AUTO: 49 %
NRBC # BLD AUTO: 0 10E3/UL
NRBC BLD AUTO-RTO: 0 /100
PLATELET # BLD AUTO: 197 10E3/UL (ref 150–450)
POTASSIUM SERPL-SCNC: 4.2 MMOL/L (ref 3.4–5.3)
PROT SERPL-MCNC: 7.5 G/DL (ref 6.4–8.3)
RBC # BLD AUTO: 4.19 10E6/UL (ref 3.8–5.2)
SODIUM SERPL-SCNC: 145 MMOL/L (ref 135–145)
WBC # BLD AUTO: 7.3 10E3/UL (ref 4–11)

## 2024-10-31 PROCEDURE — 86300 IMMUNOASSAY TUMOR CA 15-3: CPT | Performed by: INTERNAL MEDICINE

## 2024-10-31 PROCEDURE — 36591 DRAW BLOOD OFF VENOUS DEVICE: CPT | Performed by: INTERNAL MEDICINE

## 2024-10-31 PROCEDURE — 84155 ASSAY OF PROTEIN SERUM: CPT | Performed by: INTERNAL MEDICINE

## 2024-10-31 PROCEDURE — 258N000003 HC RX IP 258 OP 636: Performed by: INTERNAL MEDICINE

## 2024-10-31 PROCEDURE — 85014 HEMATOCRIT: CPT | Performed by: INTERNAL MEDICINE

## 2024-10-31 PROCEDURE — 96413 CHEMO IV INFUSION 1 HR: CPT

## 2024-10-31 PROCEDURE — 80051 ELECTROLYTE PANEL: CPT | Performed by: INTERNAL MEDICINE

## 2024-10-31 PROCEDURE — 82378 CARCINOEMBRYONIC ANTIGEN: CPT | Performed by: INTERNAL MEDICINE

## 2024-10-31 PROCEDURE — 85004 AUTOMATED DIFF WBC COUNT: CPT | Performed by: INTERNAL MEDICINE

## 2024-10-31 PROCEDURE — 250N000011 HC RX IP 250 OP 636: Mod: JZ | Performed by: INTERNAL MEDICINE

## 2024-10-31 RX ORDER — HEPARIN SODIUM (PORCINE) LOCK FLUSH IV SOLN 100 UNIT/ML 100 UNIT/ML
5 SOLUTION INTRAVENOUS ONCE
Status: COMPLETED | OUTPATIENT
Start: 2024-10-31 | End: 2024-10-31

## 2024-10-31 RX ORDER — HEPARIN SODIUM (PORCINE) LOCK FLUSH IV SOLN 100 UNIT/ML 100 UNIT/ML
5 SOLUTION INTRAVENOUS EVERY 8 HOURS
Status: DISCONTINUED | OUTPATIENT
Start: 2024-10-31 | End: 2024-10-31 | Stop reason: HOSPADM

## 2024-10-31 RX ADMIN — Medication 5 ML: at 12:43

## 2024-10-31 RX ADMIN — HEPARIN 5 ML: 100 SYRINGE at 11:15

## 2024-10-31 RX ADMIN — TRASTUZUMAB 450 MG: 150 INJECTION, POWDER, LYOPHILIZED, FOR SOLUTION INTRAVENOUS at 12:06

## 2024-10-31 ASSESSMENT — PAIN SCALES - GENERAL: PAINLEVEL_OUTOF10: NO PAIN (0)

## 2024-10-31 NOTE — PATIENT INSTRUCTIONS
[facility] Triage and after hours / weekends / holidays:  [phone]    Please call the triage or after hours line if you experience a temperature greater than or equal to 100.4, shaking chills, have uncontrolled nausea, vomiting and/or diarrhea, dizziness, shortness of breath, chest pain, bleeding, unexplained bruising, or if you have any other new/concerning symptoms, questions or concerns.      If you are having any concerning symptoms or wish to speak to a provider before your next infusion visit, please call triage to notify them so we can adequately serve you.     If you need a refill on a narcotic prescription or other medication, please call before your infusion appointment.                October 2024 Sunday Monday Tuesday Wednesday Thursday Friday Saturday             1     2     3     4     5       6     7     8     9     10    RETURN CCSL   8:15 AM   (45 min.)   Kassidy Robles APRN CNP   [facility]    LAB CENTRAL  12:00 PM   (15 min.)   [facility] DRAW   [facility]    ONC INFUSION 1 HR (60 MIN)  12:30 PM   (60 min.)    ONC INFUSION NURSE   [facility] 11     12       13     14     15     16     17     18     19       20     21     22     23     24     25     26       27     28     29     30     31    LAB CENTRAL  10:30 AM   (15 min.)   [facility] DRAW   [facility]    ONC INFUSION 1 HR (60 MIN)  11:00 AM   (60 min.)    ONC INFUSION NURSE   [facility]                       November 2024 Sunday Monday Tuesday Wednesday Thursday Friday Saturday                            1     2       3     4     5     6     7     8     9       10     11     12     13     14     15     16       17     18     19     20     21    RETURN CCSL   8:15 AM   (45 min.)   Kassidy Robles APRN CNP   [facility]    LAB  CENTRAL  12:00 PM   (15 min.)   [facility] DRAW   [facility]    ONC INFUSION 1 HR (60 MIN)  12:30 PM   (60 min.)    ONC INFUSION NURSE   [facility] 22     23       24     25     26     27     28     29     30                     Lab Results:  Recent Results (from the past 12 hours)   Comprehensive metabolic panel    Collection Time: 10/31/24 11:13 AM   Result Value Ref Range    Sodium 145 135 - 145 mmol/L    Potassium 4.2 3.4 - 5.3 mmol/L    Carbon Dioxide (CO2) 28 22 - 29 mmol/L    Anion Gap 13 7 - 15 mmol/L    Urea Nitrogen 13.5 8.0 - 23.0 mg/dL    Creatinine 0.83 0.51 - 0.95 mg/dL    GFR Estimate 76 >60 mL/min/1.73m2    Calcium 9.4 8.8 - 10.4 mg/dL    Chloride 104 98 - 107 mmol/L    Glucose 88 70 - 99 mg/dL    Alkaline Phosphatase 61 40 - 150 U/L    AST 49 (H) 0 - 45 U/L    ALT 45 0 - 50 U/L    Protein Total 7.5 6.4 - 8.3 g/dL    Albumin 3.9 3.5 - 5.2 g/dL    Bilirubin Total 0.3 <=1.2 mg/dL   CBC with platelets and differential    Collection Time: 10/31/24 11:13 AM   Result Value Ref Range    WBC Count 7.3 4.0 - 11.0 10e3/uL    RBC Count 4.19 3.80 - 5.20 10e6/uL    Hemoglobin 12.2 11.7 - 15.7 g/dL    Hematocrit 39.4 35.0 - 47.0 %    MCV 94 78 - 100 fL    MCH 29.1 26.5 - 33.0 pg    MCHC 31.0 (L) 31.5 - 36.5 g/dL    RDW 13.5 10.0 - 15.0 %    Platelet Count 197 150 - 450 10e3/uL    % Neutrophils 49 %    % Lymphocytes 40 %    % Monocytes 8 %    % Eosinophils 3 %    % Basophils 0 %    % Immature Granulocytes 0 %    NRBCs per 100 WBC 0 <1 /100    Absolute Neutrophils 3.6 1.6 - 8.3 10e3/uL    Absolute Lymphocytes 2.9 0.8 - 5.3 10e3/uL    Absolute Monocytes 0.6 0.0 - 1.3 10e3/uL    Absolute Eosinophils 0.2 0.0 - 0.7 10e3/uL    Absolute Basophils 0.0 0.0 - 0.2 10e3/uL    Absolute Immature Granulocytes 0.0 <=0.4 10e3/uL    Absolute NRBCs 0.0 10e3/uL

## 2024-10-31 NOTE — NURSING NOTE
Chief Complaint   Patient presents with    Blood Draw     Port blood draw by lab RN       Port accessed with blood draw and heparin flush by lab RN. Vitals taken and next appointment arrived.    Tanya De La Paz RN

## 2024-10-31 NOTE — PROGRESS NOTES
Infusion Nursing Note:  Hoda Brush presents today for cycle 122 day 1 trastuzumab (herceptin).    Patient seen by provider today: No   present during visit today: Not Applicable.    Note: Pt comes to infusion today with no questions or concerns. Pt has no pain today and denies any need for intervention at this appointment. Pt has been afebrile and denies signs and symptoms of infection including: cough, SOB, sore throat, diarrhea, vomiting, rash, or pain with urination. Pt wishes to proceed with today's planned treatment.    Intravenous Access:  Implanted Port.    Treatment Conditions:  Lab Results   Component Value Date    HGB 12.2 10/31/2024    WBC 7.3 10/31/2024    ANEU 3.1 06/22/2021    ANEUTAUTO 3.6 10/31/2024     10/31/2024     Lab Results   Component Value Date     10/31/2024    POTASSIUM 4.2 10/31/2024    CR 0.83 10/31/2024    TAYLER 9.4 10/31/2024    BILITOTAL 0.3 10/31/2024    ALBUMIN 3.9 10/31/2024    ALT 45 10/31/2024    AST 49 (H) 10/31/2024     Results reviewed, labs MET treatment parameters, ok to proceed with treatment.  Echo completed 9/19/24 - EF 55-60%.    Post Infusion Assessment:  Patient tolerated infusion without incident.  Blood return noted pre and post infusion.  Site patent and intact, free from redness, edema or discomfort.  No evidence of extravasations.  Access discontinued per protocol.     Discharge Plan:   Patient declined prescription refills.  Discharge instructions reviewed with: Patient.  Patient and/or family verbalized understanding of discharge instructions and all questions answered.  AVS to patient via [employer].  Patient will return 11/21/24 for next appointment.   Patient discharged in stable condition accompanied by: self.  Departure Mode: Ambulatory.      Emily Meese, RN

## 2024-11-01 LAB — CANCER AG27-29 SERPL-ACNC: <9 U/ML

## 2024-11-11 ENCOUNTER — NURSE TRIAGE (OUTPATIENT)
Dept: FAMILY MEDICINE | Facility: CLINIC | Age: 69
End: 2024-11-11
Payer: COMMERCIAL

## 2024-11-11 NOTE — TELEPHONE ENCOUNTER
"Nurse Triage SBAR    Is this a 2nd Level Triage? YES, LICENSED PRACTITIONER REVIEW IS REQUIRED    Situation:Patient is having a red lesion on face that is raised. The redness has gone away and it is now decreasing in size. Nurse booked an appointment for the lesion to be seen and assessed properly.  Patient has had the redness for a week.     Protocol Recommended Disposition:   See in Office Today or Tomorrow    Recommendation: scheduled    Booked to be seen     Does the patient meet one of the following criteria for ADS visit consideration? No    Reason for Disposition   Caller can't describe it clearly    Additional Information   Negative: Looks infected (e.g., spreading redness, red streak, pus)   Negative: Fever and bump is tender to touch   Negative: Patient sounds very sick or weak to the triager   Negative: Looks like a boil, infected sore, or deep ulcer    Answer Assessment - Initial Assessment Questions  1. APPEARANCE of LESION: \"What does it look like?\"       Its a small, red dark and infected   2. SIZE: \"How big is it?\" (e.g., inches, cm; or compare to size of pinhead, tip of pen, eraser, coin, pea, grape, ping pong ball)   1 mm  3. COLOR: \"What color is it?\" \"Is there more than one color?\"      red  4. SHAPE: \"What shape is it?\" (e.g., round, irregular)      round  5. RAISED: \"Does it stick up above the skin or is it flat?\" (e.g., raised or elevated)    Sticks up  6. TENDER: \"Does it hurt when you touch it?\"  (Scale 1-10; or mild, moderate, severe)     itchy  7. LOCATION: \"Where is it located?\"       Under the eye  8. ONSET: \"When did it first appear?\"      Been a long time   9. NUMBER: \"Is there just one?\" or \"Are there others?\"      5 days ago  10. CAUSE: \"What do you think it is?\"        Skin tag  11. OTHER SYMPTOMS: \"Do you have any other symptoms?\" (e.g., fever)        no  12. PREGNANCY: \"Is there any chance you are pregnant?\" \"When was your last menstrual period?\"        no    Protocols used: Skin " Lesion - Moles or Growths-A-OH

## 2024-11-12 ENCOUNTER — OFFICE VISIT (OUTPATIENT)
Dept: FAMILY MEDICINE | Facility: CLINIC | Age: 69
End: 2024-11-12
Payer: COMMERCIAL

## 2024-11-12 VITALS
RESPIRATION RATE: 16 BRPM | WEIGHT: 179.7 LBS | BODY MASS INDEX: 33.07 KG/M2 | TEMPERATURE: 98.2 F | SYSTOLIC BLOOD PRESSURE: 125 MMHG | HEIGHT: 62 IN | OXYGEN SATURATION: 94 % | HEART RATE: 88 BPM | DIASTOLIC BLOOD PRESSURE: 83 MMHG

## 2024-11-12 DIAGNOSIS — L98.9 SKIN LESION: Primary | ICD-10-CM

## 2024-11-12 PROCEDURE — 99213 OFFICE O/P EST LOW 20 MIN: CPT | Performed by: PHYSICIAN ASSISTANT

## 2024-11-12 ASSESSMENT — PAIN SCALES - GENERAL: PAINLEVEL_OUTOF10: NO PAIN (0)

## 2024-11-12 NOTE — PROGRESS NOTES
"Assessment and Plan:     (L98.9) Skin lesion  (primary encounter diagnosis)  Comment: mole vs skin tag under left eye, glasses rub on it and has been inflamed/irritated, she would like it removed   Plan: Adult Dermatology  Referral          JUSTINE Luciano Same Day Provider       Subjective   Hoda is a 69 year old, presenting for the following health issues:  Derm Problem (Patient report redness around skin tag close to left eye)    History of Present Illness       Reason for visit:  Problem with my skin   She is taking medications regularly.       Hoda is here for irritated skin tag vs mole under her left eye  She has had a small mole under left eye for years  5 days ago she noticed the area became inflamed and red after her sunglasses had rubbed against it  The irritation has improved, no fever/chills or spreading redness  It is still a little itchy but less red  She would like to see derm to excise the lesion    Objective    /83 (BP Location: Right arm, Patient Position: Sitting, Cuff Size: Adult Regular)   Pulse 88   Temp 98.2  F (36.8  C) (Temporal)   Resp 16   Ht 1.575 m (5' 2\")   Wt 81.5 kg (179 lb 11.2 oz)   SpO2 91%   BMI 32.87 kg/m    Body mass index is 32.87 kg/m .    Physical Exam     GENERAL: healthy, alert and no distress  SKIN: very small (about 1mm in diameter) skin tag vs mole under left eye, no surrounding erythema or induration           Signed Electronically by: Di Maynard PA-C    "

## 2024-11-18 ENCOUNTER — OFFICE VISIT (OUTPATIENT)
Dept: DERMATOLOGY | Facility: CLINIC | Age: 69
End: 2024-11-18
Attending: PHYSICIAN ASSISTANT
Payer: COMMERCIAL

## 2024-11-18 DIAGNOSIS — L91.8 INFLAMED SKIN TAG: ICD-10-CM

## 2024-11-18 DIAGNOSIS — L98.9 SKIN LESION: Primary | ICD-10-CM

## 2024-11-18 ASSESSMENT — PAIN SCALES - GENERAL: PAINLEVEL_OUTOF10: NO PAIN (0)

## 2024-11-18 NOTE — PATIENT INSTRUCTIONS
Cryotherapy    What is it?  Use of a very cold liquid, such as liquid nitrogen, to freeze and destroy abnormal skin cells that need to be removed    What should I expect?  Tenderness and redness  A small blister that might grow and fill with dark purple blood. There may be crusting.  More than one treatment may be needed if the lesions do not go away.    How do I care for the treated area?  Gently wash the area with your hands when bathing.  Use a thin layer of Vaseline to help with healing. You may use a Band-Aid.   The area should heal within 7-10 days and may leave behind a pink or lighter color.   Do not use an antibiotic or Neosporin ointment.   You may take acetaminophen (Tylenol) for pain.     Call your Doctor if you have:  Severe pain  Signs of infection (warmth, redness, cloudy yellow drainage, and or a bad smell)  Questions or concerns    Who should I call with questions?      [facility]: [phone]      [facility]: [phone]      For urgent needs outside of business hours call the [facility] at [phone]        and ask for the dermatology resident on call

## 2024-11-18 NOTE — LETTER
11/18/2024      Hoda Brush  [address]  [facility] [address]      Dear Colleague,    Thank you for referring your patient, Hoda Brush, to the [facility]. Please see a copy of my visit note below.    [facility] Dermatology Note  Encounter Date: Nov 18, 2024  Office Visit      Dermatology Problem List:    Skin tags S/p cryo   ____________________________________________    Assessment & Plan:  # Skin tags, irritated.   - Cryotherapy performed today, see procedure note below.    Procedures Performed:   CRYOTHERAPY PROCEDURE NOTE: 14 lesions in the above locations were treated with liquid nitrogen utilizing a 5-10sec thaw time. Patient was advised that the treated areas will become red, swollen, may develop a blister and then should crust and peal off in the next 1-2 weeks. Post-procedure instructions were provided.    Follow-up: prn for new or changing lesions    Staff and scribe:    Scribe Disclosure:   I, LACEY TODD, am serving as a scribe; to document services personally performed by Chiquis Gomez PA-C -based on data collection and the provider's statements to me.     Provider Disclosure:  I agree with above History, Review of Systems, Physical exam and Plan.  I have reviewed the content of the documentation and have edited it as needed. I have personally performed the services documented here and the documentation accurately represents those services and the decisions I have made.      Electronically signed by:    All risks, benefits and alternatives were discussed with patient.  Patient is in agreement and understands the assessment and plan.  All questions were answered.    Chiquis Gomez PA-C, MPAS  [facility] [place]: Phone: [phone], Fax: [phone]  [facility]: Phone: [phone],  Fax: [phone]  [facility] [place]: Phone:  [phone], Fax: [phone]  ____________________________________________    CC: Derm Problem (Lesion under left eye/Itchy around neck)      Reviewed patients past medical history and pertinent chart review prior to patient's visit today.     HPI:  Ms. Hoda Brush is a 69 year old female who presents today as a new patient for a spot check. Today patient reported a spot of concern under her L eye. Notes that spot is itchy and tender.     She also reported itchiness around her neck .     No personal or family hx of skin cancer.     Patient is otherwise feeling well, without additional concerns.    Labs:  N/A    Physical Exam:  Vitals: There were no vitals taken for this visit.  SKIN: Focused examination of areas noted below was performed.   - There is(are) skin colored pedunculated papules with erythema on the:  L eyelid x  4,  x 1 R eyelid, R axilla, x 4, neck x 14   - No other lesions of concern on areas examined.     Medications:  Current Outpatient Medications   Medication Sig Dispense Refill     acetaminophen (TYLENOL) 500 MG tablet Take 1,000 mg by mouth every 6 hours as needed for mild pain       calcium carbonate (OS-TAYLER) 500 MG tablet Take 1 tablet by mouth daily       famotidine (PEPCID) 20 MG tablet Take 1 tablet (20 mg) by mouth 2 times daily 180 tablet 3     fluticasone (FLONASE) 50 MCG/ACT nasal spray Spray 2 sprays into both nostrils daily 16 g 3     letrozole (FEMARA) 2.5 MG tablet Take 1 tablet (2.5 mg) by mouth daily 90 tablet 3     traZODone (DESYREL) 50 MG tablet TAKE 1/2 TABLET(25 MG) BY MOUTH EVERY NIGHT AS NEEDED FOR SLEEP 45 tablet 2     VITAMIN D, CHOLECALCIFEROL, PO Take 1,000 Units by mouth daily       order for DME Equipment being ordered: 2 Mastectomy bras and 1 prosthetheses. 2 Piece 0     silver sulfADIAZINE (SILVADENE) 1 % external cream Apply to procedure site left great toe twice daily with dressing changes until healed (Patient not taking: Reported on 11/18/2024) 50 g  0     No current facility-administered medications for this visit.      Past Medical/Surgical History:   Patient Active Problem List   Diagnosis     Malignant neoplasm of left female breast, unspecified estrogen receptor status, unspecified site of breast (H)     Bone metastasis     Cellulitis of left upper extremity     Metastatic breast cancer     Acute cholecystitis     Fatty liver     S/P mastectomy, left     Family history of malignant neoplasm of breast     Osteonecrosis, unspecified (H)     Pulmonary nodules     Hyperlipidemia, unspecified hyperlipidemia type     Past Medical History:   Diagnosis Date     Breast cancer metastasized to bone (H)      Lymphedema of left upper extremity                         Again, thank you for allowing me to participate in the care of your patient.        Sincerely,        Chiquis Gomez PA-C

## 2024-11-21 ENCOUNTER — APPOINTMENT (OUTPATIENT)
Dept: LAB | Facility: CLINIC | Age: 69
End: 2024-11-21
Attending: NURSE PRACTITIONER
Payer: COMMERCIAL

## 2024-11-21 ENCOUNTER — INFUSION THERAPY VISIT (OUTPATIENT)
Dept: ONCOLOGY | Facility: CLINIC | Age: 69
End: 2024-11-21
Attending: NURSE PRACTITIONER
Payer: COMMERCIAL

## 2024-11-21 VITALS
RESPIRATION RATE: 18 BRPM | TEMPERATURE: 98.4 F | SYSTOLIC BLOOD PRESSURE: 117 MMHG | BODY MASS INDEX: 32.98 KG/M2 | OXYGEN SATURATION: 95 % | WEIGHT: 180.3 LBS | DIASTOLIC BLOOD PRESSURE: 76 MMHG | HEART RATE: 83 BPM

## 2024-11-21 VITALS — WEIGHT: 179 LBS | BODY MASS INDEX: 32.94 KG/M2 | HEIGHT: 62 IN

## 2024-11-21 DIAGNOSIS — C50.912 MALIGNANT NEOPLASM OF LEFT FEMALE BREAST, UNSPECIFIED ESTROGEN RECEPTOR STATUS, UNSPECIFIED SITE OF BREAST (H): Primary | ICD-10-CM

## 2024-11-21 LAB
ALBUMIN SERPL BCG-MCNC: 3.9 G/DL (ref 3.5–5.2)
ALP SERPL-CCNC: 68 U/L (ref 40–150)
ALT SERPL W P-5'-P-CCNC: 44 U/L (ref 0–50)
ANION GAP SERPL CALCULATED.3IONS-SCNC: 8 MMOL/L (ref 7–15)
AST SERPL W P-5'-P-CCNC: 47 U/L (ref 0–45)
BASOPHILS # BLD AUTO: 0 10E3/UL (ref 0–0.2)
BASOPHILS NFR BLD AUTO: 1 %
BILIRUB SERPL-MCNC: 0.3 MG/DL
BUN SERPL-MCNC: 13.9 MG/DL (ref 8–23)
CALCIUM SERPL-MCNC: 9.1 MG/DL (ref 8.8–10.4)
CEA SERPL-MCNC: 1.1 NG/ML
CHLORIDE SERPL-SCNC: 104 MMOL/L (ref 98–107)
CREAT SERPL-MCNC: 0.84 MG/DL (ref 0.51–0.95)
EGFRCR SERPLBLD CKD-EPI 2021: 75 ML/MIN/1.73M2
EOSINOPHIL # BLD AUTO: 0.4 10E3/UL (ref 0–0.7)
EOSINOPHIL NFR BLD AUTO: 5 %
ERYTHROCYTE [DISTWIDTH] IN BLOOD BY AUTOMATED COUNT: 13.4 % (ref 10–15)
GLUCOSE SERPL-MCNC: 77 MG/DL (ref 70–99)
HCO3 SERPL-SCNC: 29 MMOL/L (ref 22–29)
HCT VFR BLD AUTO: 39.1 % (ref 35–47)
HGB BLD-MCNC: 12.5 G/DL (ref 11.7–15.7)
IMM GRANULOCYTES # BLD: 0 10E3/UL
IMM GRANULOCYTES NFR BLD: 0 %
LYMPHOCYTES # BLD AUTO: 3.1 10E3/UL (ref 0.8–5.3)
LYMPHOCYTES NFR BLD AUTO: 40 %
MCH RBC QN AUTO: 29.8 PG (ref 26.5–33)
MCHC RBC AUTO-ENTMCNC: 32 G/DL (ref 31.5–36.5)
MCV RBC AUTO: 93 FL (ref 78–100)
MONOCYTES # BLD AUTO: 0.7 10E3/UL (ref 0–1.3)
MONOCYTES NFR BLD AUTO: 9 %
NEUTROPHILS # BLD AUTO: 3.6 10E3/UL (ref 1.6–8.3)
NEUTROPHILS NFR BLD AUTO: 46 %
NRBC # BLD AUTO: 0 10E3/UL
NRBC BLD AUTO-RTO: 0 /100
PLATELET # BLD AUTO: 179 10E3/UL (ref 150–450)
POTASSIUM SERPL-SCNC: 4 MMOL/L (ref 3.4–5.3)
PROT SERPL-MCNC: 7.5 G/DL (ref 6.4–8.3)
RBC # BLD AUTO: 4.19 10E6/UL (ref 3.8–5.2)
SODIUM SERPL-SCNC: 141 MMOL/L (ref 135–145)
WBC # BLD AUTO: 7.7 10E3/UL (ref 4–11)

## 2024-11-21 PROCEDURE — 250N000011 HC RX IP 250 OP 636: Performed by: INTERNAL MEDICINE

## 2024-11-21 PROCEDURE — 80053 COMPREHEN METABOLIC PANEL: CPT | Performed by: INTERNAL MEDICINE

## 2024-11-21 PROCEDURE — 258N000003 HC RX IP 258 OP 636: Performed by: INTERNAL MEDICINE

## 2024-11-21 PROCEDURE — 86300 IMMUNOASSAY TUMOR CA 15-3: CPT | Performed by: INTERNAL MEDICINE

## 2024-11-21 PROCEDURE — 36591 DRAW BLOOD OFF VENOUS DEVICE: CPT | Performed by: INTERNAL MEDICINE

## 2024-11-21 PROCEDURE — 85004 AUTOMATED DIFF WBC COUNT: CPT | Performed by: INTERNAL MEDICINE

## 2024-11-21 PROCEDURE — 82378 CARCINOEMBRYONIC ANTIGEN: CPT | Performed by: INTERNAL MEDICINE

## 2024-11-21 PROCEDURE — 250N000011 HC RX IP 250 OP 636: Performed by: NURSE PRACTITIONER

## 2024-11-21 RX ORDER — HEPARIN SODIUM (PORCINE) LOCK FLUSH IV SOLN 100 UNIT/ML 100 UNIT/ML
5 SOLUTION INTRAVENOUS EVERY 8 HOURS
Status: DISCONTINUED | OUTPATIENT
Start: 2024-11-21 | End: 2024-11-21 | Stop reason: HOSPADM

## 2024-11-21 RX ORDER — HEPARIN SODIUM (PORCINE) LOCK FLUSH IV SOLN 100 UNIT/ML 100 UNIT/ML
5 SOLUTION INTRAVENOUS ONCE
Status: COMPLETED | OUTPATIENT
Start: 2024-11-21 | End: 2024-11-21

## 2024-11-21 RX ADMIN — TRASTUZUMAB 450 MG: 150 INJECTION, POWDER, LYOPHILIZED, FOR SOLUTION INTRAVENOUS at 13:01

## 2024-11-21 RX ADMIN — HEPARIN SODIUM (PORCINE) LOCK FLUSH IV SOLN 100 UNIT/ML 5 ML: 100 SOLUTION at 13:33

## 2024-11-21 RX ADMIN — HEPARIN SODIUM (PORCINE) LOCK FLUSH IV SOLN 100 UNIT/ML 5 ML: 100 SOLUTION at 12:06

## 2024-11-21 ASSESSMENT — PAIN SCALES - GENERAL: PAINLEVEL_OUTOF10: NO PAIN (0)

## 2024-11-21 NOTE — LETTER
11/21/2024      Hoda Brush  [address]  [facility] [address]      Dear Colleague,    Thank you for referring your patient, Hoda Brush, to the [facility]. Please see a copy of my visit note below.    Virtual Visit Details    Type of service:  Video Visit   Video Start Time:  0825  Video End Time: 0835    Originating Location (pt. Location): Home  Distant Location (provider location):  On-site  Platform used for Video Visit: [email or web address]    Nov 21, 2024    ONCOLOGY RETURN VISIT:     Hoda is a patient from [facility] and is seen here for continuation of care for her metastatic ER+HER2- breast cancer.  She is a refugee from [facility] and was initially seen in clinic with her daughter.  The only data we have from [facility] is an English translation of her records.       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  On 02/11/2014, she was seen by an oncologist.  The clinical staging of T4b N2 M1 was noted.   Her breast cancer was on the left side. There was no right breast cancer, confirmed by the patient and her daughter, correcting a possible error in the translated records.  ER was positive in 100% of the cells, MS at 14% and HER2 was 3+ positive.  It appears that this histopathologic information is on the mastectomy specimen. She underwent an MRI for staging of presumptive bone metastases which was performed 03/02/2014.  There were skeletal metastases in the thoracic vertebrae at 12, L1, L3, L5 and S1 vertebral body, ranging in size from 0.7-3.0 cm in size.  Ischial and right femur were also involved, as well as a large iliac mass measuring about 15 cm in the uterus. 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   Radiation Oncology consultation was performed 03/11/2014, and she was given radiation in 1 dose of 6 Gy to the right iliac lesion.        With the initial diagnosis, she initiated  treatment with 6 cycles of CAF neoadjuvant chemotherapy 02/14, 07/07, 03/28, 04/18/14, 05/08 and 05/29/14. She also received monthly zoledronic acid.  She then underwent a modified left mastectomy of Palacios type and left lymphadenoectomy on 06/27/2014.   Pathologic examination showed number at [facility] was [id number] showed infiltrative grade 2 ductal cancer with 6 level 1 metastatic lymph nodes and 3 level 2 metastatic lymph nodes.  The differentiation was intermediate.  The tumor was ER positive 70% of the cells, NM positive in 40% of the cells and HER2 was 3+ by immunohistochemistry and the Ki-67 labeling index was 20%. Her staging after surgery was stage IV, pT4b N2 M1.  I don't see a biopsy of the skeletal metastases.  She then had continuation with chemotherapy with 4 cycles of Herceptin and taxane with monthly zoledronic acid.  Tamoxifen was initiated.  She then had two years of Herceptin and a decision was made not to continue further Herceptin.  She continued on zoledronic acid every 3 months. She was clinically stable. [place] then moved to the [place] new refugee from [facility].  She has now been changed to letrozole.  Denosumab was held for new diagnosis of osteonecrosis.     History of cholecystectomy 2020.      TREATMENT HISTORY:  A. Initial diagnosis with metastatic breast cancer in [facility].  Neoadjuvant CAF x 6.   B. Left mastectomy. Left axillary node dissection.  C. Radiation in 1 dose to R iliac region.  C. Herceptin for 2 years taxane for a prescribed course then stopped, monthly zoledronic acid.  She had 2 years of Herceptin with tamoxifen added after chemotherapy.  D. Tamoxifen alone and zoledronic acid every 3 months starting 2014.  Letrozole was started   E. [place]  We restarted Herceptin every 3 weeks and continued tamoxifen. Bone targeted agent changed to denosumab every 9 weeks. Zometa discontinued 2017.  Tamoxifen was changed to letrozole August 2019.    F. Xgeva discontinued  12-17-19 because of dental issues.   G. J+J vaccine March, 2021.  H. Was on Zometa once May 11.  Reaction with eye lid swelling. Discontinued Zometa.  H. Restart Xgeva 6-22-21.  Continuing the trastuzumab and letrozole.  Both tolerated well.   I. Discontinue Xgeva because of osteonecrosis of the maxilla. Continue trastuzumab and letrozole.       INTERVAL HISTORY:  Hoda is seen via video today for evaluation and toxicity check while on cancer-directed therapy with Herceptin and letrozole.   -She has no new concerns today.   -She was at her dermatologist last week and had some skin tags removed and a lesion on her leg treated with liquid nitrogen.   -Her energy is good.   -She otherwise denies any new aches or bony pain.    -She denies fevers or chills or signs of systemic illness.    -No new headaches, dizziness, double vision.  -She denies cough, chest pain, or shortness of breath at rest.    -She denies nausea, vomiting, abdominal pain or bowel changes.    -She is suffering from allergy symptoms - itchy eyes and runny nose.  She is taking Claritin and wonders if she can use this daily.   -No new dental concerns - she is saw her dentist 10 days ago.  Recall that she has been diagnosed and treated for ONJ this year and her last Xgeva was in January 2024.   -No skin changes, rashes or leg swelling.     ROS: 12 point ROS neg other than the symptoms noted above in the HPI.    PHYSICAL EXAMINATION:    Video physical exam  General: Patient appears well in no acute distress.   Skin: No visualized rash or lesions on visualized skin  Eyes: EOMI, no erythema, sclera icterus or discharge noted  Resp: Appears to be breathing comfortably without accessory muscle usage, speaking in full sentences, no cough  MSK: Appears to have normal range of motion based on visualized movements  Neurologic: No apparent tremors, facial movements symmetric  Psych: affect bright, alert and oriented       LABORATORY DATA:   Most Recent 3  CBC's:  Recent Labs   Lab Test 10/31/24  1113 10/10/24  1233 09/19/24  1020   WBC 7.3 7.1 7.6   HGB 12.2 13.0 12.3   MCV 94 92 94    214 196   ANEUTAUTO 3.6 3.3 4.1     Most Recent 3 BMP's:  Recent Labs   Lab Test 10/31/24  1113 10/10/24  1233 09/19/24  1020    141 143   POTASSIUM 4.2 4.4 4.1   CHLORIDE 104 105 106   CO2 28 27 27   BUN 13.5 11.6 15.4   CR 0.83 0.85 0.80   ANIONGAP 13 9 10   TAYLER 9.4 9.5 9.3   GLC 88 84 88   PROTTOTAL 7.5 7.5 7.4   ALBUMIN 3.9 4.0 4.0    Most Recent 3 LFT's:  Recent Labs   Lab Test 10/31/24  1113 10/10/24  1233 09/19/24  1020   AST 49* 50* 46*   ALT 45 48 46   ALKPHOS 61 62 57   BILITOTAL 0.3 0.3 0.4    Most Recent 2 TSH and T4:  Recent Labs   Lab Test 05/16/24  1307 07/06/23  1216   TSH 0.47 0.77   I reviewed the labs above.  Labs for today to be drawn at her infusion visit.    IMAGING:  Echo 9/19/2024:  Interpretation Summary     Interpretation Summary  Global and regional left ventricular function is normal with an EF of 55-60%.  Right ventricular size and function are normal.  No significant valvular abnormalities present.  No pericardial effusion is present..    She is scheduled for a repeat echo in December 2024.     ASSESSMENT AND PLAN:       Hoda Brush is a 68-year-old woman with a history of metastatic ER-positive, TX-positive, HER2 positive breast cancer to the bones.  She is from [facility], formally from [facility], and came to live in the [place] with [place].   She had metastatic disease at the time of presentation with bone-only metastases by report.  She underwent neoadjuvant CAF, had a left mastectomy, left axillary lymph node dissection, radiation to the right hip, which included the right iliac region where she had metastatic disease.  She was initially on tamoxifen but then was switched to letrozole.  She continues on this and every 3-week trastuzumab.  She has had no evidence of disease progression for the last 3 years.  Markers are low  and stable, pending today.  We have continued Herceptin every 3 weeks as well as daily letrozole. Her last CT CAP showed stable pulmonary nodules and stable sclerotic bone metastasis.   - Clinically appropriate to continue herceptin and letrozole.  Labs pending later today.    - 9/2024 echo overall stable with EF of 55-60%. Repeat echo scheduled next month.   - Continue CT CAP every 4 months, due January 2025. Order placed at prior visit and message sent to scheduling.    Bone Metastasis   - Hx of R maxillary osteonecrosis has healed, but Dr. Aguiar placed Xgeva on hold for now.  She is due for repeat DEXA ~July 2025.  She is seeing her dentist regularly, last 10 days ago and again next month.  No new concerns.  - No report of pain today.      ALT/AST elevation  - AST and ALT mildly elevated in the past.  Labs pending today.   - Per chart review she has had intermittent elevations over the last several years.   - Most recent CT with no concerning findings; does have hepatic steatosis and is s/p cholecystectomy in 2020.   - Continue to closely monitor.    Follow up   - Continue herceptin and labs every three weeks with provider visits every 3-6 weeks.  She is scheduled to see Dr. Aguiar post-CT in January.  We review this today.     KATHI Valencia, CNP  [facility]  [address] [place] [id number]  [phone]    22 minutes spent on the date of the encounter doing chart review, review of test results, interpretation of tests, patient visit, and documentation. The longitudinal plan of care for the diagnosis(es)/condition(s) as documented were addressed during this visit. Due to the added complexity in care, I will continue to support Hoda in the subsequent management and with ongoing continuity of care.             Again, thank you for allowing me to participate in the care of your patient.        Sincerely,        KATHI Mendoza CNP

## 2024-11-21 NOTE — PROGRESS NOTES
Nov 21, 2024    ONCOLOGY RETURN VISIT:     Hoda is a patient from [facility] and is seen here for continuation of care for her metastatic ER+HER2- breast cancer.  She is a refugee from [facility] and was initially seen in clinic with her daughter.  The only data we have from [facility] is an English translation of her records.       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  On 02/11/2014, she was seen by an oncologist.  The clinical staging of T4b N2 M1 was noted.   Her breast cancer was on the left side. There was no right breast cancer, confirmed by the patient and her daughter, correcting a possible error in the translated records.  ER was positive in 100% of the cells, SD at 14% and HER2 was 3+ positive.  It appears that this histopathologic information is on the mastectomy specimen. She underwent an MRI for staging of presumptive bone metastases which was performed 03/02/2014.  There were skeletal metastases in the thoracic vertebrae at 12, L1, L3, L5 and S1 vertebral body, ranging in size from 0.7-3.0 cm in size.  Ischial and right femur were also involved, as well as a large iliac mass measuring about 15 cm in the uterus. 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   Radiation Oncology consultation was performed 03/11/2014, and she was given radiation in 1 dose of 6 Gy to the right iliac lesion.        With the initial diagnosis, she initiated treatment with 6 cycles of CAF neoadjuvant chemotherapy 02/14, 07/07, 03/28, 04/18/14, 05/08 and 05/29/14. She also received monthly zoledronic acid.  She then underwent a modified left mastectomy of Palacios type and left lymphadenoectomy on 06/27/2014.   Pathologic examination showed number at [facility] was [id number] showed infiltrative grade 2 ductal cancer with 6 level 1 metastatic lymph nodes and 3 level 2 metastatic lymph nodes.  The differentiation was intermediate.  The tumor was  ER positive 70% of the cells, AR positive in 40% of the cells and HER2 was 3+ by immunohistochemistry and the Ki-67 labeling index was 20%. Her staging after surgery was stage IV, pT4b N2 M1.  I don't see a biopsy of the skeletal metastases.  She then had continuation with chemotherapy with 4 cycles of Herceptin and taxane with monthly zoledronic acid.  Tamoxifen was initiated.  She then had two years of Herceptin and a decision was made not to continue further Herceptin.  She continued on zoledronic acid every 3 months. She was clinically stable. [place] then [place] the [place] new refugee from [facility].  She has now been changed to letrozole.  Denosumab was held for new diagnosis of osteonecrosis.     History of cholecystectomy 2020.      TREATMENT HISTORY:  A. Initial diagnosis with metastatic breast cancer in [facility].  Neoadjuvant CAF x 6.   B. Left mastectomy. Left axillary node dissection.  C. Radiation in 1 dose to R iliac region.  C. Herceptin for 2 years taxane for a prescribed course then stopped, monthly zoledronic acid.  She had 2 years of Herceptin with tamoxifen added after chemotherapy.  D. Tamoxifen alone and zoledronic acid every 3 months starting 2014.  Letrozole was started   [place]  We restarted Herceptin every 3 weeks and continued tamoxifen. Bone targeted agent changed to denosumab every 9 weeks. Zometa discontinued 2017.  Tamoxifen was changed to letrozole August 2019.    F. Xgeva discontinued 12-17-19 because of dental issues.   G. J+J vaccine March, 2021.  H. Was on Zometa once May 11.  Reaction with eye lid swelling. Discontinued Zometa.  H. Restart Xgeva 6-22-21.  Continuing the trastuzumab and letrozole.  Both tolerated well.   I. Discontinue Xgeva because of osteonecrosis of the maxilla. Continue trastuzumab and letrozole.       INTERVAL HISTORY:  Hoda is seen via video today for evaluation and toxicity check while on cancer-directed therapy with Herceptin and letrozole.   -She  has no new concerns today.   -She was at her dermatologist last week and had some skin tags removed and a lesion on her leg treated with liquid nitrogen.   -Her energy is good.   -She otherwise denies any new aches or bony pain.    -She denies fevers or chills or signs of systemic illness.    -No new headaches, dizziness, double vision.  -She denies cough, chest pain, or shortness of breath at rest.    -She denies nausea, vomiting, abdominal pain or bowel changes.    -She is suffering from allergy symptoms - itchy eyes and runny nose.  She is taking Claritin and wonders if she can use this daily.   -No new dental concerns - she is saw her dentist 10 days ago.  Recall that she has been diagnosed and treated for ONJ this year and her last Xgeva was in January 2024.   -No skin changes, rashes or leg swelling.     ROS: 12 point ROS neg other than the symptoms noted above in the HPI.    PHYSICAL EXAMINATION:    Video physical exam  General: Patient appears well in no acute distress.   Skin: No visualized rash or lesions on visualized skin  Eyes: EOMI, no erythema, sclera icterus or discharge noted  Resp: Appears to be breathing comfortably without accessory muscle usage, speaking in full sentences, no cough  MSK: Appears to have normal range of motion based on visualized movements  Neurologic: No apparent tremors, facial movements symmetric  Psych: affect bright, alert and oriented       LABORATORY DATA:   Most Recent 3 CBC's:  Recent Labs   Lab Test 10/31/24  1113 10/10/24  1233 09/19/24  1020   WBC 7.3 7.1 7.6   HGB 12.2 13.0 12.3   MCV 94 92 94    214 196   ANEUTAUTO 3.6 3.3 4.1     Most Recent 3 BMP's:  Recent Labs   Lab Test 10/31/24  1113 10/10/24  1233 09/19/24  1020    141 143   POTASSIUM 4.2 4.4 4.1   CHLORIDE 104 105 106   CO2 28 27 27   BUN 13.5 11.6 15.4   CR 0.83 0.85 0.80   ANIONGAP 13 9 10   TAYLER 9.4 9.5 9.3   GLC 88 84 88   PROTTOTAL 7.5 7.5 7.4   ALBUMIN 3.9 4.0 4.0    Most Recent 3  LFT's:  Recent Labs   Lab Test 10/31/24  1113 10/10/24  1233 09/19/24  1020   AST 49* 50* 46*   ALT 45 48 46   ALKPHOS 61 62 57   BILITOTAL 0.3 0.3 0.4    Most Recent 2 TSH and T4:  Recent Labs   Lab Test 05/16/24  1307 07/06/23  1216   TSH 0.47 0.77   I reviewed the labs above.  Labs for today to be drawn at her infusion visit.    IMAGING:  Echo 9/19/2024:  Interpretation Summary     Interpretation Summary  Global and regional left ventricular function is normal with an EF of 55-60%.  Right ventricular size and function are normal.  No significant valvular abnormalities present.  No pericardial effusion is present..    She is scheduled for a repeat echo in December 2024.     ASSESSMENT AND PLAN:       Hoda Brush is a 68-year-old woman with a history of metastatic ER-positive, MS-positive, HER2 positive breast cancer to the bones.  She is from [facility], formally from [facility], and came to live in the [place] with [place].   She had metastatic disease at the time of presentation with bone-only metastases by report.  She underwent neoadjuvant CAF, had a left mastectomy, left axillary lymph node dissection, radiation to the right hip, which included the right iliac region where she had metastatic disease.  She was initially on tamoxifen but then was switched to letrozole.  She continues on this and every 3-week trastuzumab.  She has had no evidence of disease progression for the last 3 years.  Markers are low and stable, pending today.  We have continued Herceptin every 3 weeks as well as daily letrozole. Her last CT CAP showed stable pulmonary nodules and stable sclerotic bone metastasis.   - Clinically appropriate to continue herceptin and letrozole.  Labs pending later today.    - 9/2024 echo overall stable with EF of 55-60%. Repeat echo scheduled next month.   - Continue CT CAP every 4 months, due January 2025. Order placed at prior visit and message sent to scheduling.    Bone Metastasis   - Hx of  R maxillary osteonecrosis has healed, but Dr. Aguiar placed Xgeva on hold for now.  She is due for repeat DEXA ~July 2025.  She is seeing her dentist regularly, last 10 days ago and again next month.  No new concerns.  - No report of pain today.      ALT/AST elevation  - AST and ALT mildly elevated in the past.  Labs pending today.   - Per chart review she has had intermittent elevations over the last several years.   - Most recent CT with no concerning findings; does have hepatic steatosis and is s/p cholecystectomy in 2020.   - Continue to closely monitor.    Follow up   - Continue herceptin and labs every three weeks with provider visits every 3-6 weeks.  She is scheduled to see Dr. Aguiar post-CT in January.  We review this today.     KATHI Valencia, CNP  [facility] [address] [address]  [phone]    22 minutes spent on the date of the encounter doing chart review, review of test results, interpretation of tests, patient visit, and documentation. The longitudinal plan of care for the diagnosis(es)/condition(s) as documented were addressed during this visit. Due to the added complexity in care, I will continue to support Hoda in the subsequent management and with ongoing continuity of care.

## 2024-11-21 NOTE — PROGRESS NOTES
Infusion Nursing Note:  Hoda Brush presents today for Cycle 123 Day 1 trastuzumab.    Patient seen by provider today: Yes: virtual visit with Kassidy Robles CNP   present during visit today: Not Applicable.    Note: Hoda presents today feeling well. Denies pain or nausea/vomiting. Offers no concerns since visit with Kassidy Robles prior to infusion.      Intravenous Access:  Implanted Port.    Treatment Conditions:     Latest Reference Range & Units 11/21/24 12:14   Sodium 135 - 145 mmol/L 141   Potassium 3.4 - 5.3 mmol/L 4.0   Chloride 98 - 107 mmol/L 104   Carbon Dioxide (CO2) 22 - 29 mmol/L 29   Urea Nitrogen 8.0 - 23.0 mg/dL 13.9   Creatinine 0.51 - 0.95 mg/dL 0.84   GFR Estimate >60 mL/min/1.73m2 75   Calcium 8.8 - 10.4 mg/dL 9.1   Anion Gap 7 - 15 mmol/L 8   Albumin 3.5 - 5.2 g/dL 3.9   Protein Total 6.4 - 8.3 g/dL 7.5   Alkaline Phosphatase 40 - 150 U/L 68   ALT 0 - 50 U/L 44   AST 0 - 45 U/L 47 (H)   Bilirubin Total <=1.2 mg/dL 0.3   Glucose 70 - 99 mg/dL 77   WBC 4.0 - 11.0 10e3/uL 7.7   Hemoglobin 11.7 - 15.7 g/dL 12.5   Hematocrit 35.0 - 47.0 % 39.1   Platelet Count 150 - 450 10e3/uL 179   RBC Count 3.80 - 5.20 10e6/uL 4.19   MCV 78 - 100 fL 93   MCH 26.5 - 33.0 pg 29.8   MCHC 31.5 - 36.5 g/dL 32.0   RDW 10.0 - 15.0 % 13.4   % Neutrophils % 46   % Lymphocytes % 40   % Monocytes % 9   % Eosinophils % 5   % Basophils % 1   Absolute Basophils 0.0 - 0.2 10e3/uL 0.0   Absolute Eosinophils 0.0 - 0.7 10e3/uL 0.4   Absolute Immature Granulocytes <=0.4 10e3/uL 0.0   Absolute Lymphocytes 0.8 - 5.3 10e3/uL 3.1   Absolute Monocytes 0.0 - 1.3 10e3/uL 0.7   % Immature Granulocytes % 0   Absolute Neutrophils 1.6 - 8.3 10e3/uL 3.6   Absolute NRBCs 10e3/uL 0.0   NRBCs per 100 WBC <1 /100 0     Results reviewed, labs MET treatment parameters, ok to proceed with treatment.  ECHO/MUGA completed 09/19/24  EF 55-60%.      Post Infusion Assessment:  Patient tolerated infusion without  incident.  Blood return noted pre and post infusion.  Site patent and intact, free from redness, edema or discomfort.  No evidence of extravasations.  Access discontinued per protocol.       Discharge Plan:   Patient declined prescription refills.  Discharge instructions reviewed with: Patient.  Patient and/or family verbalized understanding of discharge instructions and all questions answered.  AVS to patient via NoquoHART.  Patient will return 12/12 for next infusion appointment.   Patient discharged in stable condition accompanied by: self.  Departure Mode: Ambulatory.      Olivia Bains RN

## 2024-11-21 NOTE — PATIENT INSTRUCTIONS
[facility] Triage and after hours / weekends / holidays:  [phone]    Please call the triage or after hours line if you experience a temperature greater than or equal to 100.4, shaking chills, have uncontrolled nausea, vomiting and/or diarrhea, dizziness, shortness of breath, chest pain, bleeding, unexplained bruising, or if you have any other new/concerning symptoms, questions, or concerns.      If you are having any concerning symptoms or wish to speak to a provider before your next infusion visit, please call your care coordinator or triage to notify them so we can adequately serve you.     If you need a refill on a narcotic prescription or other medication, please call before your infusion appointment.

## 2024-11-21 NOTE — NURSING NOTE
Pt declined , not needed .      Current patient location: [address] [place] [address] [address]   [facility] [address]    Is the patient currently in the state of [place]? YES    Visit mode:VIDEO    If the visit is dropped, the patient can be reconnected by:TELEPHONE VISIT: Phone number: [phone]    Will anyone else be joining the visit? NO  (If patient encounters technical issues they should call [phone]    Are changes needed to the allergy or medication list? No    Are refills needed on medications prescribed by this physician? NO    Rooming Documentation:  Questionnaire(s) completed    Reason for visit: RECHECK    Kelsey HOPKINS

## 2024-11-21 NOTE — PROGRESS NOTES
Virtual Visit Details    Type of service:  Video Visit   Video Start Time:  0825  Video End Time: 0835    Originating Location (pt. Location): Home  Distant Location (provider location):  On-site  Platform used for Video Visit: SamWell

## 2024-12-11 ENCOUNTER — APPOINTMENT (OUTPATIENT)
Dept: INTERPRETER SERVICES | Facility: CLINIC | Age: 69
End: 2024-12-11
Payer: COMMERCIAL

## 2024-12-11 LAB
ALBUMIN SERPL BCG-MCNC: 3.8 G/DL (ref 3.5–5.2)
ALP SERPL-CCNC: 62 U/L (ref 40–150)
ALT SERPL W P-5'-P-CCNC: 53 U/L (ref 0–50)
ANION GAP SERPL CALCULATED.3IONS-SCNC: 12 MMOL/L (ref 7–15)
AST SERPL W P-5'-P-CCNC: 46 U/L (ref 0–45)
BASOPHILS # BLD AUTO: 0 10E3/UL (ref 0–0.2)
BASOPHILS NFR BLD AUTO: 0 %
BILIRUB SERPL-MCNC: 0.3 MG/DL
BUN SERPL-MCNC: 14.3 MG/DL (ref 8–23)
CALCIUM SERPL-MCNC: 9.3 MG/DL (ref 8.8–10.4)
CEA SERPL-MCNC: 1 NG/ML
CHLORIDE SERPL-SCNC: 105 MMOL/L (ref 98–107)
CREAT SERPL-MCNC: 0.79 MG/DL (ref 0.51–0.95)
EGFRCR SERPLBLD CKD-EPI 2021: 81 ML/MIN/1.73M2
EOSINOPHIL # BLD AUTO: 0.3 10E3/UL (ref 0–0.7)
EOSINOPHIL NFR BLD AUTO: 5 %
ERYTHROCYTE [DISTWIDTH] IN BLOOD BY AUTOMATED COUNT: 13.9 % (ref 10–15)
GLUCOSE SERPL-MCNC: 110 MG/DL (ref 70–99)
HCO3 SERPL-SCNC: 22 MMOL/L (ref 22–29)
HCT VFR BLD AUTO: 39.9 % (ref 35–47)
HGB BLD-MCNC: 12.3 G/DL (ref 11.7–15.7)
IMM GRANULOCYTES # BLD: 0 10E3/UL
IMM GRANULOCYTES NFR BLD: 0 %
LYMPHOCYTES # BLD AUTO: 3.1 10E3/UL (ref 0.8–5.3)
LYMPHOCYTES NFR BLD AUTO: 41 %
MCH RBC QN AUTO: 29.3 PG (ref 26.5–33)
MCHC RBC AUTO-ENTMCNC: 30.8 G/DL (ref 31.5–36.5)
MCV RBC AUTO: 95 FL (ref 78–100)
MONOCYTES # BLD AUTO: 0.5 10E3/UL (ref 0–1.3)
MONOCYTES NFR BLD AUTO: 6 %
NEUTROPHILS # BLD AUTO: 3.5 10E3/UL (ref 1.6–8.3)
NEUTROPHILS NFR BLD AUTO: 47 %
NRBC # BLD AUTO: 0 10E3/UL
NRBC BLD AUTO-RTO: 0 /100
PLATELET # BLD AUTO: 219 10E3/UL (ref 150–450)
POTASSIUM SERPL-SCNC: 4.4 MMOL/L (ref 3.4–5.3)
PROT SERPL-MCNC: 7.3 G/DL (ref 6.4–8.3)
RBC # BLD AUTO: 4.2 10E6/UL (ref 3.8–5.2)
SODIUM SERPL-SCNC: 139 MMOL/L (ref 135–145)
WBC # BLD AUTO: 7.4 10E3/UL (ref 4–11)

## 2024-12-11 PROCEDURE — 80053 COMPREHEN METABOLIC PANEL: CPT | Performed by: INTERNAL MEDICINE

## 2024-12-11 PROCEDURE — 82378 CARCINOEMBRYONIC ANTIGEN: CPT | Performed by: INTERNAL MEDICINE

## 2024-12-11 PROCEDURE — 86300 IMMUNOASSAY TUMOR CA 15-3: CPT | Performed by: INTERNAL MEDICINE

## 2024-12-11 PROCEDURE — 36415 COLL VENOUS BLD VENIPUNCTURE: CPT | Performed by: INTERNAL MEDICINE

## 2024-12-11 PROCEDURE — 85025 COMPLETE CBC W/AUTO DIFF WBC: CPT | Performed by: INTERNAL MEDICINE

## 2024-12-12 ENCOUNTER — ANCILLARY PROCEDURE (OUTPATIENT)
Dept: CARDIOLOGY | Facility: CLINIC | Age: 69
End: 2024-12-12
Attending: INTERNAL MEDICINE
Payer: COMMERCIAL

## 2024-12-12 ENCOUNTER — INFUSION THERAPY VISIT (OUTPATIENT)
Dept: ONCOLOGY | Facility: CLINIC | Age: 69
End: 2024-12-12
Attending: INTERNAL MEDICINE
Payer: COMMERCIAL

## 2024-12-12 VITALS
HEART RATE: 79 BPM | RESPIRATION RATE: 18 BRPM | OXYGEN SATURATION: 94 % | WEIGHT: 181.5 LBS | BODY MASS INDEX: 33.2 KG/M2 | TEMPERATURE: 98 F | SYSTOLIC BLOOD PRESSURE: 124 MMHG | DIASTOLIC BLOOD PRESSURE: 80 MMHG

## 2024-12-12 DIAGNOSIS — Z51.11 ENCOUNTER FOR ANTINEOPLASTIC CHEMOTHERAPY: ICD-10-CM

## 2024-12-12 DIAGNOSIS — R06.02 SHORTNESS OF BREATH: ICD-10-CM

## 2024-12-12 DIAGNOSIS — C50.912 MALIGNANT NEOPLASM OF LEFT FEMALE BREAST, UNSPECIFIED ESTROGEN RECEPTOR STATUS, UNSPECIFIED SITE OF BREAST (H): Primary | ICD-10-CM

## 2024-12-12 DIAGNOSIS — R05.2 SUBACUTE COUGH: ICD-10-CM

## 2024-12-12 LAB
CANCER AG27-29 SERPL-ACNC: <9 U/ML
LVEF ECHO: NORMAL

## 2024-12-12 PROCEDURE — 93356 MYOCRD STRAIN IMG SPCKL TRCK: CPT | Performed by: INTERNAL MEDICINE

## 2024-12-12 PROCEDURE — 250N000011 HC RX IP 250 OP 636: Performed by: INTERNAL MEDICINE

## 2024-12-12 PROCEDURE — 93306 TTE W/DOPPLER COMPLETE: CPT | Mod: GC | Performed by: INTERNAL MEDICINE

## 2024-12-12 PROCEDURE — 258N000003 HC RX IP 258 OP 636: Performed by: INTERNAL MEDICINE

## 2024-12-12 RX ORDER — HEPARIN SODIUM (PORCINE) LOCK FLUSH IV SOLN 100 UNIT/ML 100 UNIT/ML
5 SOLUTION INTRAVENOUS EVERY 8 HOURS
Status: DISCONTINUED | OUTPATIENT
Start: 2024-12-12 | End: 2024-12-12 | Stop reason: HOSPADM

## 2024-12-12 RX ADMIN — HEPARIN 5 ML: 100 SYRINGE at 12:36

## 2024-12-12 RX ADMIN — SODIUM CHLORIDE 450 MG: 9 INJECTION, SOLUTION INTRAVENOUS at 12:01

## 2024-12-12 ASSESSMENT — PAIN SCALES - GENERAL: PAINLEVEL_OUTOF10: NO PAIN (0)

## 2024-12-12 NOTE — LETTER
12/12/2024      Hoda Brush  [address]  [facility] [address]      Dear Colleague,    Thank you for referring your patient, Hoda Brush, to the [facility]. Please see a copy of my visit note below.    Dec 12, 2024    ONCOLOGY RETURN VISIT:     Hoda is a patient from [facility] and is seen here for continuation of care for her metastatic ER+HER2- breast cancer.  She is a refugee from [facility] and was initially seen in clinic with her daughter.  The only data we have from [facility] is an English translation of her records.       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  On 02/11/2014, she was seen by an oncologist.  The clinical staging of T4b N2 M1 was noted.   Her breast cancer was on the left side. There was no right breast cancer, confirmed by the patient and her daughter, correcting a possible error in the translated records.  ER was positive in 100% of the cells, TN at 14% and HER2 was 3+ positive.  It appears that this histopathologic information is on the mastectomy specimen. She underwent an MRI for staging of presumptive bone metastases which was performed 03/02/2014.  There were skeletal metastases in the thoracic vertebrae at 12, L1, L3, L5 and S1 vertebral body, ranging in size from 0.7-3.0 cm in size.  Ischial and right femur were also involved, as well as a large iliac mass measuring about 15 cm in the uterus. 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   Radiation Oncology consultation was performed 03/11/2014, and she was given radiation in 1 dose of 6 Gy to the right iliac lesion.        With the initial diagnosis, she initiated treatment with 6 cycles of CAF neoadjuvant chemotherapy 02/14, 07/07, 03/28, 04/18/14, 05/08 and 05/29/14. She also received monthly zoledronic acid.  She then underwent a modified left mastectomy of Palacios type and left lymphadenoectomy on  06/27/2014.   Pathologic examination showed number at [facility] was [id number] showed infiltrative grade 2 ductal cancer with 6 level 1 metastatic lymph nodes and 3 level 2 metastatic lymph nodes.  The differentiation was intermediate.  The tumor was ER positive 70% of the cells, WV positive in 40% of the cells and HER2 was 3+ by immunohistochemistry and the Ki-67 labeling index was 20%. Her staging after surgery was stage IV, pT4b N2 M1.  I don't see a biopsy of the skeletal metastases.  She then had continuation with chemotherapy with 4 cycles of Herceptin and taxane with monthly zoledronic acid.  Tamoxifen was initiated.  She then had two years of Herceptin and a decision was made not to continue further Herceptin.  She continued on zoledronic acid every 3 months. She was clinically stable. [place] then [place] the [place] new refugee from [facility].  She has now been changed to letrozole.  Denosumab was held for new diagnosis of osteonecrosis.     History of cholecystectomy 2020.      TREATMENT HISTORY:  A. Initial diagnosis with metastatic breast cancer in [facility].  Neoadjuvant CAF x 6.   B. Left mastectomy. Left axillary node dissection.  C. Radiation in 1 dose to R iliac region.  C. Herceptin for 2 years taxane for a prescribed course then stopped, monthly zoledronic acid.  She had 2 years of Herceptin with tamoxifen added after chemotherapy.  D. Tamoxifen alone and zoledronic acid every 3 months starting 2014.  Letrozole was started   [place]  We restarted Herceptin every 3 weeks and continued tamoxifen. Bone targeted agent changed to denosumab every 9 weeks. Zometa discontinued 2017.  Tamoxifen was changed to letrozole August 2019.    F. Xgeva discontinued 12-17-19 because of dental issues.   G. J+J vaccine March, 2021.  H. Was on Zometa once May 11.  Reaction with eye lid swelling. Discontinued Zometa.  H. Restart Xgeva 6-22-21.  Continuing the trastuzumab and letrozole.  Both tolerated well.    I. Discontinue Xgeva because of osteonecrosis of the maxilla. Continue trastuzumab and letrozole.       INTERVAL HISTORY:  Hoda is seen in infusion today for evaluation of subacute cough and some shortness of breath.  She is here today for  Herceptin and continues on letrozole.   -At her virtual visit 3 weeks ago she was feeling well with some allergy symptoms (itchy eyes and runny nose).  She was taking Claritin for this.  -Today, she told nursing that she was coughing more and having some shortness of breath when reclining.    -Sleeping well.   -Cough is dry and she does cough with deep breaths.  She is afebrile and her O2 sat on RA in infusion is 94%.   -Echo done earlier this am and results pending.  She has no LE swelling.  Denies chest pain.    -No history of asthma, but her daughter did think she heard some noisier breathing/wheezing when she was on a call with her per patient.   -No other new concerns today.      ROS: 12 point ROS neg other than the symptoms noted above in the HPI.    PHYSICAL EXAMINATION:    General: The patient is a pleasant female in no acute distress.  /80. RR 18, P 79, T 98 F, O2 say 95% on RA, which is her baseline.   Wt Readings from Last 10 Encounters:   11/21/24 81.8 kg (180 lb 4.8 oz)   11/21/24 81.2 kg (179 lb)   11/12/24 81.5 kg (179 lb 11.2 oz)   10/31/24 81.5 kg (179 lb 11.2 oz)   10/10/24 81.9 kg (180 lb 8 oz)   10/10/24 81.6 kg (180 lb)   09/19/24 81.9 kg (180 lb 8 oz)   09/04/24 82.6 kg (182 lb)   08/29/24 82.6 kg (182 lb)   08/08/24 81.9 kg (180 lb 8 oz)   HEENT: EOMI. Sclerae are anicteric.   Lymph: Neck is supple with no lymphadenopathy in the cervical or supraclavicular areas.   Heart: Regular rate and rhythm.   Lungs: Clear to auscultation bilaterally with the exception of possible mild crackles RLL that cleared after one deep breath.  She does have a dry cough with deep breaths.  Speaking in full, fluid sentences.  No accessory muscle usage.   Extremities: No  lower extremity edema noted bilaterally.   Neuro: Cranial nerves II through XII are grossly intact.  Skin: No rashes, petechiae, or bruising noted on exposed skin.     LABORATORY DATA:   Most Recent 3 CBC's:  Recent Labs   Lab Test 12/11/24  0817 11/21/24  1214 10/31/24  1113   WBC 7.4 7.7 7.3   HGB 12.3 12.5 12.2   MCV 95 93 94    179 197   ANEUTAUTO 3.5 3.6 3.6     Most Recent 3 BMP's:  Recent Labs   Lab Test 12/11/24  0817 11/21/24  1214 10/31/24  1113    141 145   POTASSIUM 4.4 4.0 4.2   CHLORIDE 105 104 104   CO2 22 29 28   BUN 14.3 13.9 13.5   CR 0.79 0.84 0.83   ANIONGAP 12 8 13   TAYLER 9.3 9.1 9.4   * 77 88   PROTTOTAL 7.3 7.5 7.5   ALBUMIN 3.8 3.9 3.9    Most Recent 3 LFT's:  Recent Labs   Lab Test 12/11/24  0817 11/21/24  1214 10/31/24  1113   AST 46* 47* 49*   ALT 53* 44 45   ALKPHOS 62 68 61   BILITOTAL 0.3 0.3 0.3    Most Recent 2 TSH and T4:  Recent Labs   Lab Test 05/16/24  1307 07/06/23  1216   TSH 0.47 0.77   I reviewed the labs above.     IMAGING:  Echo 12/12/2024:  Interpretation Summary  Global and regional left ventricular function is normal with an EF of 55-60%.  Global peak LV longitudinal strain is averaged at -20.4%. This is within  reported normal limits (normal <-18%).  Right ventricular function, chamber size, wall motion, and thickness are  normal.  No significant valvular abnormalities present.  The inferior vena cava was normal in size with preserved respiratory  variability.  No pericardial effusion is present.     This study was compared with the study from 9/19/2024. No significant changes  noted.    CXR 12/12/2024:  Narrative & Impression   XR CHEST 2 VIEWS  12/12/2024 10:57 AM      HISTORY:  70 yo with breast cancer, new cough and shortness of breath;  Malignant neoplasm of left female breast, unspecified estrogen  receptor status, unspecified site of breast (H); Subacute cough;  Shortness of breath        COMPARISON:  9/5/2024 CT CAP     TECHNIQUE: PA and  Lateral upright views of the chest.     FINDINGS:   Right internal jugular port-a-cath tip terminates in the mid-SVC.     Cardiomediastinal silhouette is within normal limits. Trachea is  midline. No appreciable pleural effusion or pneumothorax. No acute  focal consolidations.      Sclerotic changes of right fifth rib again noted.                                                                      IMPRESSION: No acute focal consolidation. Clear lungs.      I have personally reviewed the examination and initial interpretation  and I agree with the findings.     GLORIA ELLIS MD            ASSESSMENT AND PLAN:       Hoda Brush is a 68-year-old woman with a history of metastatic ER-positive, TN-positive, HER2 positive breast cancer to the bones.  She is from [facility], formally from [facility], and came to live in the [place] with her daughter.   She had metastatic disease at the time of presentation with bone-only metastases by report.  She underwent neoadjuvant CAF, had a left mastectomy, left axillary lymph node dissection, radiation to the right hip, which included the right iliac region where she had metastatic disease.  She was initially on tamoxifen but then was switched to letrozole.  She continues on this and every 3-week trastuzumab.  She has had no evidence of disease progression for the last 3 years.  Markers are low and stable, pending today.  We have continued Herceptin every 3 weeks as well as daily letrozole. Her last CT CAP showed stable pulmonary nodules and stable sclerotic bone metastasis.   - Clinically appropriate to continue herceptin and letrozole.     - 12/12/2024 echo overall stable with EF of 55-60%. Repeat echo in three months.   - Continue CT CAP every 4 months, due and scheduled for January 2025.     Cough/shortness of breath:  - Echo done today as scheduled and results pending.  - As above, onset 2 weeks ago, no other infectious symptoms.  Three weeks ago she was reporting  allergy symptoms with itchy eyes and runny nose.   - Dry cough with deep breaths and some increased shortness of breath when she lies down but is still sleeping well.   - O2 sat 94% on RA and speaking in full, fluid sentences.  - No wheezing noted.  - CRX today: without concerns.    - Discussed that this could also be allergies vs. Resolving URI.  Advised to call with any worsening.     Bone Metastasis   - Hx of R maxillary osteonecrosis has healed, but Dr. Aguiar placed Xgeva on hold for now.  She is due for repeat DEXA ~July 2025.  She is seeing her dentist regularly, last 10 days ago and again next month.  No new concerns.  - No report of pain today.      ALT/AST elevation  - AST and ALT mildly elevated in the past and were mildly elevated yesterday.  - Per chart review she has had intermittent elevations over the last several years.   - Most recent CT with no concerning findings; does have hepatic steatosis and is s/p cholecystectomy in 2020.   - Continue to closely monitor.    Follow up   - Continue herceptin and labs every three weeks with provider visits every 3-6 weeks.  She is scheduled to see Dr. Aguiar post-CT in January.  We review this today.     KATHI Valencia, CNP  [facility] [place] [address]  [phone]    32 minutes spent on the date of the encounter doing chart review, review of test results, interpretation of tests, patient visit, and documentation. The longitudinal plan of care for the diagnosis(es)/condition(s) as documented were addressed during this visit. Due to the added complexity in care, I will continue to support Hoda in the subsequent management and with ongoing continuity of care.      Again, thank you for allowing me to participate in the care of your patient.        Sincerely,        KATHI Mendoza CNP

## 2024-12-12 NOTE — PROGRESS NOTES
Infusion Nursing Note:  Hoda Brush presents today for Cycle 124 Day 1 Herceptin.    Patient seen by provider today: No   present during visit today: Not Applicable.    Note: Patient denies any fevers or any other infectious symptoms, but reports an ongoing cough/SOB when laying down over the last two weeks. Denies any swelling in her extremities. Patient offers no additional complaints or concerns and wishes to proceed with planned treatment today.    TORB 12/12/24 @ 10:30am: Kassidy Robles CNP/Shaina Hatch RN: Patient reports ongoing SOB when laying back or laying down; concurrent cough. This has been going on for about 2 weeks.    Kassidy Robles CNP assessed patient at bedside. Chest x-ray ordered.    TORB 12/12/24 @ 11:20am: Kassidy Robles CNP/Shaina Hatch RN: Preliminary results look good; ok to proceed with treatment today.     Intravenous Access:  Implanted Port.    Treatment Conditions:  Lab Results   Component Value Date    HGB 12.3 12/11/2024    WBC 7.4 12/11/2024    ANEU 3.1 06/22/2021    ANEUTAUTO 3.5 12/11/2024     12/11/2024        Lab Results   Component Value Date     12/11/2024    POTASSIUM 4.4 12/11/2024    CR 0.79 12/11/2024    TAYLER 9.3 12/11/2024    BILITOTAL 0.3 12/11/2024    ALBUMIN 3.8 12/11/2024    ALT 53 (H) 12/11/2024    AST 46 (H) 12/11/2024     ECHO/MUGA completed 12/12/24: EF 55-60%.  Results reviewed, labs MET treatment parameters, ok to proceed with treatment.    Post Infusion Assessment:  Patient tolerated infusion without incident.  Blood return noted pre and post infusion.  Site patent and intact, free from redness, edema or discomfort.  No evidence of extravasations.  Access discontinued per protocol.     Discharge Plan:   Patient declined prescription refills.  Discharge instructions reviewed with: Patient.  Patient and/or family verbalized understanding of discharge instructions and all questions answered.  AVS to patient via  TRU.  Patient will return 01/07/25 for next appointment due to patient going on a trip.   Patient discharged in stable condition accompanied by: self.  Departure Mode: Ambulatory.      Shaina Hatch RN

## 2024-12-12 NOTE — PATIENT INSTRUCTIONS
[facility] Triage and after hours / weekends / holidays:  [phone]    Please call the triage or after hours line if you experience a temperature greater than or equal to 100.4, shaking chills, have uncontrolled nausea, vomiting and/or diarrhea, dizziness, shortness of breath, chest pain, bleeding, unexplained bruising, or if you have any other new/concerning symptoms, questions or concerns.      If you are having any concerning symptoms or wish to speak to a provider before your next infusion visit, please call triage to notify them so we can adequately serve you.     If you need a refill on a narcotic prescription or other medication, please call before your infusion appointment.

## 2024-12-12 NOTE — PROGRESS NOTES
Dec 12, 2024    ONCOLOGY RETURN VISIT:     Hoda is a patient from [facility] and is seen here for continuation of care for her metastatic ER+HER2- breast cancer.  She is a refugee from [facility] and was initially seen in clinic with her daughter.  The only data we have from [facility] is an English translation of her records.       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  On 02/11/2014, she was seen by an oncologist.  The clinical staging of T4b N2 M1 was noted.   Her breast cancer was on the left side. There was no right breast cancer, confirmed by the patient and her daughter, correcting a possible error in the translated records.  ER was positive in 100% of the cells, NJ at 14% and HER2 was 3+ positive.  It appears that this histopathologic information is on the mastectomy specimen. She underwent an MRI for staging of presumptive bone metastases which was performed 03/02/2014.  There were skeletal metastases in the thoracic vertebrae at 12, L1, L3, L5 and S1 vertebral body, ranging in size from 0.7-3.0 cm in size.  Ischial and right femur were also involved, as well as a large iliac mass measuring about 15 cm in the uterus. 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   Radiation Oncology consultation was performed 03/11/2014, and she was given radiation in 1 dose of 6 Gy to the right iliac lesion.        With the initial diagnosis, she initiated treatment with 6 cycles of CAF neoadjuvant chemotherapy 02/14, 07/07, 03/28, 04/18/14, 05/08 and 05/29/14. She also received monthly zoledronic acid.  She then underwent a modified left mastectomy of Palacios type and left lymphadenoectomy on 06/27/2014.   Pathologic examination showed number at [facility] was [id number] showed infiltrative grade 2 ductal cancer with 6 level 1 metastatic lymph nodes and 3 level 2 metastatic lymph nodes.  The differentiation was intermediate.  The tumor was  ER positive 70% of the cells, OH positive in 40% of the cells and HER2 was 3+ by immunohistochemistry and the Ki-67 labeling index was 20%. Her staging after surgery was stage IV, pT4b N2 M1.  I don't see a biopsy of the skeletal metastases.  She then had continuation with chemotherapy with 4 cycles of Herceptin and taxane with monthly zoledronic acid.  Tamoxifen was initiated.  She then had two years of Herceptin and a decision was made not to continue further Herceptin.  She continued on zoledronic acid every 3 months. She was clinically stable. [place] then moved to the [place] as new refugee from [facility].  She has now been changed to letrozole.  Denosumab was held for new diagnosis of osteonecrosis.     History of cholecystectomy 2020.      TREATMENT HISTORY:  A. Initial diagnosis with metastatic breast cancer in [facility].  Neoadjuvant CAF x 6.   B. Left mastectomy. Left axillary node dissection.  C. Radiation in 1 dose to R iliac region.  C. Herceptin for 2 years taxane for a prescribed course then stopped, monthly zoledronic acid.  She had 2 years of Herceptin with tamoxifen added after chemotherapy.  D. Tamoxifen alone and zoledronic acid every 3 months starting 2014.  Letrozole was started   [place]  We restarted Herceptin every 3 weeks and continued tamoxifen. Bone targeted agent changed to denosumab every 9 weeks. Zometa discontinued 2017.  Tamoxifen was changed to letrozole August 2019.    F. Xgeva discontinued 12-17-19 because of dental issues.   G. J+J vaccine March, 2021.  H. Was on Zometa once May 11.  Reaction with eye lid swelling. Discontinued Zometa.  H. Restart Xgeva 6-22-21.  Continuing the trastuzumab and letrozole.  Both tolerated well.   I. Discontinue Xgeva because of osteonecrosis of the maxilla. Continue trastuzumab and letrozole.       INTERVAL HISTORY:  Hoda is seen in infusion today for evaluation of subacute cough and some shortness of breath.  She is here today for  Herceptin and  continues on letrozole.   -At her virtual visit 3 weeks ago she was feeling well with some allergy symptoms (itchy eyes and runny nose).  She was taking Claritin for this.  -Today, she told nursing that she was coughing more and having some shortness of breath when reclining.    -Sleeping well.   -Cough is dry and she does cough with deep breaths.  She is afebrile and her O2 sat on RA in infusion is 94%.   -Echo done earlier this am and results pending.  She has no LE swelling.  Denies chest pain.    -No history of asthma, but her daughter did think she heard some noisier breathing/wheezing when she was on a call with her per patient.   -No other new concerns today.      ROS: 12 point ROS neg other than the symptoms noted above in the HPI.    PHYSICAL EXAMINATION:    General: The patient is a pleasant female in no acute distress.  /80. RR 18, P 79, T 98 F, O2 say 95% on RA, which is her baseline.   Wt Readings from Last 10 Encounters:   11/21/24 81.8 kg (180 lb 4.8 oz)   11/21/24 81.2 kg (179 lb)   11/12/24 81.5 kg (179 lb 11.2 oz)   10/31/24 81.5 kg (179 lb 11.2 oz)   10/10/24 81.9 kg (180 lb 8 oz)   10/10/24 81.6 kg (180 lb)   09/19/24 81.9 kg (180 lb 8 oz)   09/04/24 82.6 kg (182 lb)   08/29/24 82.6 kg (182 lb)   08/08/24 81.9 kg (180 lb 8 oz)   HEENT: EOMI. Sclerae are anicteric.   Lymph: Neck is supple with no lymphadenopathy in the cervical or supraclavicular areas.   Heart: Regular rate and rhythm.   Lungs: Clear to auscultation bilaterally with the exception of possible mild crackles RLL that cleared after one deep breath.  She does have a dry cough with deep breaths.  Speaking in full, fluid sentences.  No accessory muscle usage.   Extremities: No lower extremity edema noted bilaterally.   Neuro: Cranial nerves II through XII are grossly intact.  Skin: No rashes, petechiae, or bruising noted on exposed skin.     LABORATORY DATA:   Most Recent 3 CBC's:  Recent Labs   Lab Test 12/11/24  0817  11/21/24  1214 10/31/24  1113   WBC 7.4 7.7 7.3   HGB 12.3 12.5 12.2   MCV 95 93 94    179 197   ANEUTAUTO 3.5 3.6 3.6     Most Recent 3 BMP's:  Recent Labs   Lab Test 12/11/24  0817 11/21/24  1214 10/31/24  1113    141 145   POTASSIUM 4.4 4.0 4.2   CHLORIDE 105 104 104   CO2 22 29 28   BUN 14.3 13.9 13.5   CR 0.79 0.84 0.83   ANIONGAP 12 8 13   TAYLER 9.3 9.1 9.4   * 77 88   PROTTOTAL 7.3 7.5 7.5   ALBUMIN 3.8 3.9 3.9    Most Recent 3 LFT's:  Recent Labs   Lab Test 12/11/24  0817 11/21/24  1214 10/31/24  1113   AST 46* 47* 49*   ALT 53* 44 45   ALKPHOS 62 68 61   BILITOTAL 0.3 0.3 0.3    Most Recent 2 TSH and T4:  Recent Labs   Lab Test 05/16/24  1307 07/06/23  1216   TSH 0.47 0.77   I reviewed the labs above.     IMAGING:  Echo 12/12/2024:  Interpretation Summary  Global and regional left ventricular function is normal with an EF of 55-60%.  Global peak LV longitudinal strain is averaged at -20.4%. This is within  reported normal limits (normal <-18%).  Right ventricular function, chamber size, wall motion, and thickness are  normal.  No significant valvular abnormalities present.  The inferior vena cava was normal in size with preserved respiratory  variability.  No pericardial effusion is present.     This study was compared with the study from 9/19/2024. No significant changes  noted.    CXR 12/12/2024:  Narrative & Impression   XR CHEST 2 VIEWS  12/12/2024 10:57 AM      HISTORY:  68 yo with breast cancer, new cough and shortness of breath;  Malignant neoplasm of left female breast, unspecified estrogen  receptor status, unspecified site of breast (H); Subacute cough;  Shortness of breath        COMPARISON:  9/5/2024 CT CAP     TECHNIQUE: PA and Lateral upright views of the chest.     FINDINGS:   Right internal jugular port-a-cath tip terminates in the mid-SVC.     Cardiomediastinal silhouette is within normal limits. Trachea is  midline. No appreciable pleural effusion or pneumothorax. No  acute  focal consolidations.      Sclerotic changes of right fifth rib again noted.                                                                      IMPRESSION: No acute focal consolidation. Clear lungs.      I have personally reviewed the examination and initial interpretation  and I agree with the findings.     GLORIA ELLIS MD            ASSESSMENT AND PLAN:       Hoda Brush is a 68-year-old woman with a history of metastatic ER-positive, NM-positive, HER2 positive breast cancer to the bones.  She is from [facility], formally from [facility], and came to live in the [place] with [place].   She had metastatic disease at the time of presentation with bone-only metastases by report.  She underwent neoadjuvant CAF, had a left mastectomy, left axillary lymph node dissection, radiation to the right hip, which included the right iliac region where she had metastatic disease.  She was initially on tamoxifen but then was switched to letrozole.  She continues on this and every 3-week trastuzumab.  She has had no evidence of disease progression for the last 3 years.  Markers are low and stable, pending today.  We have continued Herceptin every 3 weeks as well as daily letrozole. Her last CT CAP showed stable pulmonary nodules and stable sclerotic bone metastasis.   - Clinically appropriate to continue herceptin and letrozole.     - 12/12/2024 echo overall stable with EF of 55-60%. Repeat echo in three months.   - Continue CT CAP every 4 months, due and scheduled for January 2025.     Cough/shortness of breath:  - Echo done today as scheduled and results pending.  - As above, onset 2 weeks ago, no other infectious symptoms.  Three weeks ago she was reporting allergy symptoms with itchy eyes and runny nose.   - Dry cough with deep breaths and some increased shortness of breath when she lies down but is still sleeping well.   - O2 sat 94% on RA and speaking in full, fluid sentences.  - No wheezing noted.  -  CRX today: without concerns.    - Discussed that this could also be allergies vs. Resolving URI.  Advised to call with any worsening.     Bone Metastasis   - Hx of R maxillary osteonecrosis has healed, but Dr. Aguiar placed Xgeva on hold for now.  She is due for repeat DEXA ~July 2025.  She is seeing her dentist regularly, last 10 days ago and again next month.  No new concerns.  - No report of pain today.      ALT/AST elevation  - AST and ALT mildly elevated in the past and were mildly elevated yesterday.  - Per chart review she has had intermittent elevations over the last several years.   - Most recent CT with no concerning findings; does have hepatic steatosis and is s/p cholecystectomy in 2020.   - Continue to closely monitor.    Follow up   - Continue herceptin and labs every three weeks with provider visits every 3-6 weeks.  She is scheduled to see Dr. Aguiar post-CT in January.  We review this today.     Kassidy Robles, KATHI, CNP  [facility] [address] [address]  [phone]    32 minutes spent on the date of the encounter doing chart review, review of test results, interpretation of tests, patient visit, and documentation. The longitudinal plan of care for the diagnosis(es)/condition(s) as documented were addressed during this visit. Due to the added complexity in care, I will continue to support Hoda in the subsequent management and with ongoing continuity of care.

## 2025-01-05 NOTE — PROGRESS NOTES
ONCOLOGY NOTE     Hoda Brush  Female, 69 year old, 1955  MRN: [id number]        Hoda is a 69 year old patient from [facility] and is seen here for continuation of care for her metastatic ER+HER2- breast cancer.  She is a refugee from [facility] and was initially seen in clinic with her daughter.  The only data we have from [facility] is an English translation of her records.       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  On 02/11/2014, she was seen by an oncologist.  The clinical staging of T4b N2 M1 was noted.   Her breast cancer was on the left side. There was no right breast cancer, confirmed by the patient and her daughter, correcting a possible error in the translated records.  ER was positive in 100% of the cells, MS at 14% and HER2 was 3+ positive.  It appears that this histopathologic information is on the mastectomy specimen. She underwent an MRI for staging of presumptive bone metastases which was performed 03/02/2014.  There were skeletal metastases in the thoracic vertebrae at 12, L1, L3, L5 and S1 vertebral body, ranging in size from 0.7-3.0 cm in size.  Ischial and right femur were also involved, as well as a large iliac mass measuring about 15 cm in the uterus. 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   Radiation Oncology consultation was performed 03/11/2014, and she was given radiation in 1 dose of 6 Gy to the right iliac lesion.        With the initial diagnosis, she initiated treatment with 6 cycles of CAF neoadjuvant chemotherapy 02/14, 07/07, 03/28, 04/18/14, 05/08 and 05/29/14. She also received monthly zoledronic acid.  She then underwent a modified left mastectomy of Palacios type and left lymphadenoectomy on 06/27/2014.   Pathologic examination showed number at [facility] was [id number] showed infiltrative grade 2 ductal cancer with 6 level 1 metastatic lympFollow up with Jossie for visits  and trastuzumab on 2-5, 2-26, 3-19 with CBC, CMP.  Echocardiogram on 3-19.  Follow up with me 4-9 with CBC, CMP, CA27.29 and CEA and with CT CAP on 4-8.h nodes and 3 level 2 metastatic lymph nodes.  The differentiation was intermediate.  The tumor was ER positive 70% of the cells, IL positive in 40% of the cells and HER2 was 3+ by immunohistochemistry and the Ki-67 labeling index was 20%. Her staging after surgery was stage IV, pT4b N2 M1.  I don't see a biopsy of the skeletal metastases.  She then had continuation with chemotherapy with 4 cycles of Herceptin and taxane with monthly zoledronic acid.  Tamoxifen was initiated.  She then had two years of Herceptin and a decision was made not to continue further Herceptin.  She continued on zoledronic acid every 3 months. She was clinically stable. [place] the [place] new [place] from [facility].  She has now been changed to letrozole.  Denosumab has now been held for new diagnosis of osteonecrosis of the R maxilla.     History of cholecystectomy 2020.      TREATMENT HISTORY:  A. Initial diagnosis with metastatic breast cancer in [facility].  Neoadjuvant CAF x 6.   B. Left mastectomy. Left axillary node dissection.  C.  Radiation in 1 dose to R iliac region.  C. Herceptin for 2 years taxane for a prescribed course then stopped, monthly zoledronic acid.  She had 2 years of Herceptin with tamoxifen added after chemotherapy.  D.  Tamoxifen alone and zoledronic acid every 3 months starting 2014.  Letrozole was started   E.  [place]  We restarted Herceptin every 3 weeks and continued tamoxifen. Bone targeted agent changed to denosumab every 9 weeks. Zometa discontinued 2017.  Tamoxifen was changed to letrozole August 2019.    F.  Xgeva discontinued 12-17-19 because of dental issues.   G.  J+J vaccine March, 2021.  H.  Was on Zometa once May 11.  Reaction with eye lid swelling. Discontinued Zometa.  H.  Restart Xgeva 6-22-21.  Continuing the trastuzumab and  letrozole.  Both tolerated well.   I.  Discontinue Xgeva because of osteonecrosis of the maxilla.   Continue trastuzumab and letrozole.       INTERVAL HISTORY  She denies pain, fatigue, depression or anxiety.  She continues on trastuzumab, letrozole.  We have discontinued Xgeva because of osteonecrosis maxilla.  No pain, mild fatigue, no depression, no anxiety.  She has been feeling generally well.      She does have some cough she is going on for falling asleep.  This is probably gastroesophageal reflux.  She does take famotidine but it does not appear to help.  We can consider switching to Protonix.     REVIEW OF SYSTEMS:  A 10-point review of systems is entirely negative.     She is a eating a Mediterranean-style diet.  She is exercising by swimming.  I did advise adding a weightbearing exercise.  She takes vitamin D3 2000 International Units per day and calcium.  Her last DEXA scan performed a week ago shows a most valid negative T-score of -2.5 in the left hip, consistent with osteoporosis.     Notably, she has had no bowel or bladder problems.     PHYSICAL EXAMINATION:    VITALS: /81   Pulse 75   Temp 98.3  F (36.8  C) (Oral)   Resp 16   Wt 82.6 kg (182 lb 3.2 oz)   SpO2 96%   BMI 33.32 kg/m    GENERAL:  Hoda appeared generally well.  HEENT:  She has no alopecia.  Examination of oropharynx reveals good dentition.  LYMPH:  There is no cervical, supraclavicular, subclavicular or axillary lymphadenopathy.  BREASTS:  Right breast without masses.  Left mastectomy incision well healed with no erythema or masses.   LUNGS:  Clear to percussion and auscultation.  CARDIAC:  Heart has a regular rate and rhythm, S1, S2.  ABDOMEN:  Soft, nontender, without hepatosplenomegaly.  EXTREMITIES:  Without edema.  PSYCH:  Mood and affect were normal.     LABORATORY DATA:  CMP and CBC within normal limits except ALT 61, AST 69.  CEA, CA27.29 pending.      CT Chest/Abdomen/Pelvis   FINDINGS:   LUNGS AND PLEURA: Stable  6 mm nodule in the inferior anterior right middle lobe (series 9, image 201) and 5 mm nodule in the central left lower lobe at the lung base (9/205). No new or enlarging nodules. No infiltrate or pleural effusion.  MEDIASTINUM/AXILLAE: Stable small bilateral axillary lymph nodes. No pathologically enlarged lymph nodes. Left mastectomy. Right IJ port catheter. No thoracic aortic aneurysm. No pericardial effusion. Small hiatal hernia.  CORONARY ARTERY CALCIFICATION: None.  HEPATOBILIARY: No focal lesions in the liver. Cholecystectomy.  PANCREAS: Normal.  SPLEEN: Normal.  ADRENAL GLANDS: Normal.  KIDNEYS/BLADDER: Normal.  BOWEL: No obstruction or inflammatory change.  LYMPH NODES: No lymphadenopathy.  VASCULATURE: No abdominal aortic aneurysm.  PELVIC ORGANS: Uterine fibroids. No free fluid or fluid collections.  MUSCULOSKELETAL: Stable appearance of diffuse sclerotic metastases including lesions in the ribs, spine, right femur and pelvis. No new lesions.                                                                      IMPRESSION:  1.  Stable sclerotic osseous metastases.  2.  Few stable pulmonary nodules.  3.  No new disease.        ASSESSMENT AND PLAN:       Hoda Brush is a 69-year-old woman with a history of ER-positive, SC-positive, HER2 positive breast cancer.  She is from [facility] and came to live in the [place] with [place].  The tumor is ER positive, SC positive and HER2 positive.  She had metastatic disease at the time of presentation with bone-only metastases by report.  She underwent neoadjuvant CAF, had a left mastectomy, left axillary lymph node dissection, radiation to the right hip, which included the right iliac region where she had metastatic disease.  She was initially on tamoxifen but then was switched to letrozole.  She continues on this and every 3-week trastuzumab.  She has had no evidence of disease progression for the last 7 years and possible longer but we don't  have detailed data from [facility].  Markers are low and stable.  We have continued Herceptin every 3 weeks as well as daily letrozole. CT CAP shows stable pulmonary nodules.   Hoda Brush continues to do well on a combination of trastuzumab and letrozole. She has no evidence of disease progression.   Her ejection fraction remains stable.   She continues to do well on trastuzumab and letrozole.  She has no evidence of disease progression.  She will continue with every 4-month CT of the chest, abdomen and pelvis.    The right maxillary osteonecrosis has healed and she is able to continue with Xgeva every 3 months. Hold the Xgeva and repeat Dexa scan in 2025.    Continue the letrozole and trastuzuamb.  Letrozole has been well-tolerated, as has trastuzuamb.  No significant joint stiffness.  Echo. Stable EF.  60-65%.  Discussion of bone health and right maxillary osteonecrosis.  All healed.   Xgeva is being continued.  Chronic cough probably related to gastroesophageal reflux.  Will change famotidine to Protonix and see if there is improvement.  Referral to pulmonology.  Follow up.  Continue Herceptin through the end of 2025 alternating providers every 9 weeks. CT CAP every 4 months.  Follow up with Kassidy March 13 with CBC, CMP, CA27.29 and CEA.  Xgeva discontinued because of dental work.  Dexa  July 25 2025.  CBC, CMP every 3 weeks and CA27.29 and CEA every 6 weeks. Echo every 3 months, next on March 12.  CT CAP every 4 months, next May 14 with visit with me on May 15 with trastuzumab cycle 131.        Thank you for allowing us to participate in this patient's care.       Sincerely,      Lisandro Aguiar MD  Professor  [facility]  [phone]           I spent 35 minutes with the patient more than 50% of which was in counseling and coordination of care.

## 2025-01-06 ENCOUNTER — ANCILLARY PROCEDURE (OUTPATIENT)
Dept: CT IMAGING | Facility: CLINIC | Age: 70
End: 2025-01-06
Attending: NURSE PRACTITIONER
Payer: COMMERCIAL

## 2025-01-06 DIAGNOSIS — C50.912 MALIGNANT NEOPLASM OF LEFT FEMALE BREAST, UNSPECIFIED ESTROGEN RECEPTOR STATUS, UNSPECIFIED SITE OF BREAST (H): ICD-10-CM

## 2025-01-06 DIAGNOSIS — C79.51 MALIGNANT NEOPLASM METASTATIC TO BONE (H): ICD-10-CM

## 2025-01-06 PROCEDURE — 71260 CT THORAX DX C+: CPT

## 2025-01-06 PROCEDURE — 250N000011 HC RX IP 250 OP 636: Performed by: NURSE PRACTITIONER

## 2025-01-06 PROCEDURE — 74177 CT ABD & PELVIS W/CONTRAST: CPT

## 2025-01-06 PROCEDURE — 250N000009 HC RX 250: Performed by: NURSE PRACTITIONER

## 2025-01-06 RX ORDER — HEPARIN SODIUM (PORCINE) LOCK FLUSH IV SOLN 100 UNIT/ML 100 UNIT/ML
5 SOLUTION INTRAVENOUS ONCE
Status: COMPLETED | OUTPATIENT
Start: 2025-01-06 | End: 2025-01-06

## 2025-01-06 RX ORDER — IOPAMIDOL 755 MG/ML
89 INJECTION, SOLUTION INTRAVASCULAR ONCE
Status: COMPLETED | OUTPATIENT
Start: 2025-01-06 | End: 2025-01-06

## 2025-01-06 RX ADMIN — SODIUM CHLORIDE 80 ML: 9 INJECTION, SOLUTION INTRAVENOUS at 07:55

## 2025-01-06 RX ADMIN — IOPAMIDOL 89 ML: 755 INJECTION, SOLUTION INTRAVENOUS at 07:56

## 2025-01-06 RX ADMIN — HEPARIN SODIUM (PORCINE) LOCK FLUSH IV SOLN 100 UNIT/ML 5 ML: 100 SOLUTION at 07:55

## 2025-01-07 ENCOUNTER — INFUSION THERAPY VISIT (OUTPATIENT)
Dept: ONCOLOGY | Facility: CLINIC | Age: 70
End: 2025-01-07
Attending: INTERNAL MEDICINE
Payer: COMMERCIAL

## 2025-01-07 ENCOUNTER — APPOINTMENT (OUTPATIENT)
Dept: LAB | Facility: CLINIC | Age: 70
End: 2025-01-07
Attending: INTERNAL MEDICINE
Payer: COMMERCIAL

## 2025-01-07 VITALS
TEMPERATURE: 98.3 F | OXYGEN SATURATION: 96 % | BODY MASS INDEX: 33.32 KG/M2 | RESPIRATION RATE: 16 BRPM | SYSTOLIC BLOOD PRESSURE: 122 MMHG | WEIGHT: 182.2 LBS | DIASTOLIC BLOOD PRESSURE: 81 MMHG | HEART RATE: 75 BPM

## 2025-01-07 DIAGNOSIS — Z78.0 ASYMPTOMATIC POSTMENOPAUSAL STATUS: ICD-10-CM

## 2025-01-07 DIAGNOSIS — C50.912 MALIGNANT NEOPLASM OF LEFT FEMALE BREAST, UNSPECIFIED ESTROGEN RECEPTOR STATUS, UNSPECIFIED SITE OF BREAST (H): Primary | ICD-10-CM

## 2025-01-07 DIAGNOSIS — Z51.11 ENCOUNTER FOR ANTINEOPLASTIC CHEMOTHERAPY: ICD-10-CM

## 2025-01-07 DIAGNOSIS — R05.3 CHRONIC COUGH: ICD-10-CM

## 2025-01-07 DIAGNOSIS — K21.9 GASTROESOPHAGEAL REFLUX DISEASE WITHOUT ESOPHAGITIS: ICD-10-CM

## 2025-01-07 LAB
ALBUMIN SERPL BCG-MCNC: 4 G/DL (ref 3.5–5.2)
ALP SERPL-CCNC: 62 U/L (ref 40–150)
ALT SERPL W P-5'-P-CCNC: 61 U/L (ref 0–50)
ANION GAP SERPL CALCULATED.3IONS-SCNC: 8 MMOL/L (ref 7–15)
AST SERPL W P-5'-P-CCNC: 69 U/L (ref 0–45)
BASOPHILS # BLD AUTO: 0 10E3/UL (ref 0–0.2)
BASOPHILS NFR BLD AUTO: 1 %
BILIRUB SERPL-MCNC: 0.3 MG/DL
BUN SERPL-MCNC: 12.4 MG/DL (ref 8–23)
CALCIUM SERPL-MCNC: 9.6 MG/DL (ref 8.8–10.4)
CEA SERPL-MCNC: 1 NG/ML
CHLORIDE SERPL-SCNC: 103 MMOL/L (ref 98–107)
CREAT SERPL-MCNC: 0.86 MG/DL (ref 0.51–0.95)
EGFRCR SERPLBLD CKD-EPI 2021: 73 ML/MIN/1.73M2
EOSINOPHIL # BLD AUTO: 0.3 10E3/UL (ref 0–0.7)
EOSINOPHIL NFR BLD AUTO: 5 %
ERYTHROCYTE [DISTWIDTH] IN BLOOD BY AUTOMATED COUNT: 13.9 % (ref 10–15)
GLUCOSE SERPL-MCNC: 99 MG/DL (ref 70–99)
HCO3 SERPL-SCNC: 30 MMOL/L (ref 22–29)
HCT VFR BLD AUTO: 40.3 % (ref 35–47)
HGB BLD-MCNC: 12.8 G/DL (ref 11.7–15.7)
IMM GRANULOCYTES # BLD: 0 10E3/UL
IMM GRANULOCYTES NFR BLD: 0 %
LYMPHOCYTES # BLD AUTO: 2.6 10E3/UL (ref 0.8–5.3)
LYMPHOCYTES NFR BLD AUTO: 38 %
MCH RBC QN AUTO: 29.7 PG (ref 26.5–33)
MCHC RBC AUTO-ENTMCNC: 31.8 G/DL (ref 31.5–36.5)
MCV RBC AUTO: 94 FL (ref 78–100)
MONOCYTES # BLD AUTO: 0.4 10E3/UL (ref 0–1.3)
MONOCYTES NFR BLD AUTO: 6 %
NEUTROPHILS # BLD AUTO: 3.5 10E3/UL (ref 1.6–8.3)
NEUTROPHILS NFR BLD AUTO: 51 %
NRBC # BLD AUTO: 0 10E3/UL
NRBC BLD AUTO-RTO: 0 /100
PLATELET # BLD AUTO: 210 10E3/UL (ref 150–450)
POTASSIUM SERPL-SCNC: 4.2 MMOL/L (ref 3.4–5.3)
PROT SERPL-MCNC: 7.7 G/DL (ref 6.4–8.3)
RBC # BLD AUTO: 4.31 10E6/UL (ref 3.8–5.2)
SODIUM SERPL-SCNC: 141 MMOL/L (ref 135–145)
WBC # BLD AUTO: 6.9 10E3/UL (ref 4–11)

## 2025-01-07 PROCEDURE — 82040 ASSAY OF SERUM ALBUMIN: CPT | Performed by: INTERNAL MEDICINE

## 2025-01-07 PROCEDURE — 250N000011 HC RX IP 250 OP 636: Performed by: INTERNAL MEDICINE

## 2025-01-07 PROCEDURE — 85018 HEMOGLOBIN: CPT | Performed by: INTERNAL MEDICINE

## 2025-01-07 PROCEDURE — 86300 IMMUNOASSAY TUMOR CA 15-3: CPT | Performed by: INTERNAL MEDICINE

## 2025-01-07 PROCEDURE — 82310 ASSAY OF CALCIUM: CPT | Performed by: INTERNAL MEDICINE

## 2025-01-07 PROCEDURE — 258N000003 HC RX IP 258 OP 636: Performed by: INTERNAL MEDICINE

## 2025-01-07 PROCEDURE — 85004 AUTOMATED DIFF WBC COUNT: CPT | Performed by: INTERNAL MEDICINE

## 2025-01-07 PROCEDURE — 84155 ASSAY OF PROTEIN SERUM: CPT | Performed by: INTERNAL MEDICINE

## 2025-01-07 PROCEDURE — 82378 CARCINOEMBRYONIC ANTIGEN: CPT | Performed by: INTERNAL MEDICINE

## 2025-01-07 PROCEDURE — 36591 DRAW BLOOD OFF VENOUS DEVICE: CPT | Performed by: INTERNAL MEDICINE

## 2025-01-07 RX ORDER — METHYLPREDNISOLONE SODIUM SUCCINATE 40 MG/ML
40 INJECTION INTRAMUSCULAR; INTRAVENOUS
Status: CANCELLED
Start: 2025-03-06

## 2025-01-07 RX ORDER — MEPERIDINE HYDROCHLORIDE 25 MG/ML
25 INJECTION INTRAMUSCULAR; INTRAVENOUS; SUBCUTANEOUS
Status: CANCELLED | OUTPATIENT
Start: 2025-01-23

## 2025-01-07 RX ORDER — ALBUTEROL SULFATE 90 UG/1
1-2 INHALANT RESPIRATORY (INHALATION)
Start: 2025-01-30

## 2025-01-07 RX ORDER — DIPHENHYDRAMINE HCL 25 MG
50 CAPSULE ORAL ONCE
Status: CANCELLED
Start: 2025-01-23

## 2025-01-07 RX ORDER — MEPERIDINE HYDROCHLORIDE 25 MG/ML
25 INJECTION INTRAMUSCULAR; INTRAVENOUS; SUBCUTANEOUS
Status: CANCELLED | OUTPATIENT
Start: 2025-03-06

## 2025-01-07 RX ORDER — DIPHENHYDRAMINE HYDROCHLORIDE 50 MG/ML
25 INJECTION INTRAMUSCULAR; INTRAVENOUS
Start: 2025-01-30

## 2025-01-07 RX ORDER — EPINEPHRINE 1 MG/ML
0.3 INJECTION, SOLUTION INTRAMUSCULAR; SUBCUTANEOUS EVERY 5 MIN PRN
Status: CANCELLED | OUTPATIENT
Start: 2025-01-23

## 2025-01-07 RX ORDER — ALBUTEROL SULFATE 90 UG/1
1-2 INHALANT RESPIRATORY (INHALATION)
Status: CANCELLED
Start: 2025-03-06

## 2025-01-07 RX ORDER — ALBUTEROL SULFATE 90 UG/1
1-2 INHALANT RESPIRATORY (INHALATION)
Status: CANCELLED
Start: 2025-01-23

## 2025-01-07 RX ORDER — ACETAMINOPHEN 325 MG/1
650 TABLET ORAL ONCE
OUTPATIENT
Start: 2025-01-30

## 2025-01-07 RX ORDER — ALBUTEROL SULFATE 0.83 MG/ML
2.5 SOLUTION RESPIRATORY (INHALATION)
Status: CANCELLED | OUTPATIENT
Start: 2025-01-23

## 2025-01-07 RX ORDER — DIPHENHYDRAMINE HYDROCHLORIDE 50 MG/ML
50 INJECTION INTRAMUSCULAR; INTRAVENOUS
Status: CANCELLED
Start: 2025-01-23

## 2025-01-07 RX ORDER — DIPHENHYDRAMINE HCL 25 MG
50 CAPSULE ORAL ONCE
OUTPATIENT
Start: 2025-01-30

## 2025-01-07 RX ORDER — LORAZEPAM 2 MG/ML
0.5 INJECTION INTRAMUSCULAR EVERY 4 HOURS PRN
Status: CANCELLED
Start: 2025-01-23

## 2025-01-07 RX ORDER — EPINEPHRINE 1 MG/ML
0.3 INJECTION, SOLUTION INTRAMUSCULAR; SUBCUTANEOUS EVERY 5 MIN PRN
OUTPATIENT
Start: 2025-01-30

## 2025-01-07 RX ORDER — LORAZEPAM 2 MG/ML
0.5 INJECTION INTRAMUSCULAR EVERY 4 HOURS PRN
OUTPATIENT
Start: 2025-01-30

## 2025-01-07 RX ORDER — ALBUTEROL SULFATE 0.83 MG/ML
2.5 SOLUTION RESPIRATORY (INHALATION)
OUTPATIENT
Start: 2025-01-30

## 2025-01-07 RX ORDER — HEPARIN SODIUM (PORCINE) LOCK FLUSH IV SOLN 100 UNIT/ML 100 UNIT/ML
5 SOLUTION INTRAVENOUS
OUTPATIENT
Start: 2025-01-30

## 2025-01-07 RX ORDER — DIPHENHYDRAMINE HYDROCHLORIDE 50 MG/ML
25 INJECTION INTRAMUSCULAR; INTRAVENOUS
Status: CANCELLED
Start: 2025-03-06

## 2025-01-07 RX ORDER — MEPERIDINE HYDROCHLORIDE 25 MG/ML
25 INJECTION INTRAMUSCULAR; INTRAVENOUS; SUBCUTANEOUS
OUTPATIENT
Start: 2025-01-30

## 2025-01-07 RX ORDER — ACETAMINOPHEN 325 MG/1
650 TABLET ORAL
Status: CANCELLED | OUTPATIENT
Start: 2025-01-23

## 2025-01-07 RX ORDER — METHYLPREDNISOLONE SODIUM SUCCINATE 40 MG/ML
40 INJECTION INTRAMUSCULAR; INTRAVENOUS
Status: CANCELLED
Start: 2025-01-23

## 2025-01-07 RX ORDER — PANTOPRAZOLE SODIUM 40 MG/1
40 TABLET, DELAYED RELEASE ORAL DAILY
Qty: 90 TABLET | Refills: 3 | Status: SHIPPED | OUTPATIENT
Start: 2025-01-07

## 2025-01-07 RX ORDER — ACETAMINOPHEN 325 MG/1
650 TABLET ORAL
Status: CANCELLED | OUTPATIENT
Start: 2025-03-06

## 2025-01-07 RX ORDER — HEPARIN SODIUM (PORCINE) LOCK FLUSH IV SOLN 100 UNIT/ML 100 UNIT/ML
5 SOLUTION INTRAVENOUS EVERY 8 HOURS
Status: CANCELLED | OUTPATIENT
Start: 2025-01-23

## 2025-01-07 RX ORDER — METHYLPREDNISOLONE SODIUM SUCCINATE 40 MG/ML
40 INJECTION INTRAMUSCULAR; INTRAVENOUS
Start: 2025-01-30

## 2025-01-07 RX ORDER — HEPARIN SODIUM (PORCINE) LOCK FLUSH IV SOLN 100 UNIT/ML 100 UNIT/ML
5 SOLUTION INTRAVENOUS ONCE
Status: COMPLETED | OUTPATIENT
Start: 2025-01-07 | End: 2025-01-07

## 2025-01-07 RX ORDER — DIPHENHYDRAMINE HCL 25 MG
50 CAPSULE ORAL ONCE
Status: CANCELLED
Start: 2025-03-06

## 2025-01-07 RX ORDER — ALBUTEROL SULFATE 0.83 MG/ML
2.5 SOLUTION RESPIRATORY (INHALATION)
Status: CANCELLED | OUTPATIENT
Start: 2025-03-06

## 2025-01-07 RX ORDER — HEPARIN SODIUM,PORCINE 10 UNIT/ML
5-20 VIAL (ML) INTRAVENOUS DAILY PRN
OUTPATIENT
Start: 2025-01-30

## 2025-01-07 RX ORDER — DIPHENHYDRAMINE HYDROCHLORIDE 50 MG/ML
25 INJECTION INTRAMUSCULAR; INTRAVENOUS
Status: CANCELLED
Start: 2025-01-23

## 2025-01-07 RX ORDER — DIPHENHYDRAMINE HYDROCHLORIDE 50 MG/ML
50 INJECTION INTRAMUSCULAR; INTRAVENOUS
Start: 2025-01-30

## 2025-01-07 RX ORDER — DIPHENHYDRAMINE HYDROCHLORIDE 50 MG/ML
50 INJECTION INTRAMUSCULAR; INTRAVENOUS
Status: CANCELLED
Start: 2025-03-06

## 2025-01-07 RX ORDER — HEPARIN SODIUM (PORCINE) LOCK FLUSH IV SOLN 100 UNIT/ML 100 UNIT/ML
5 SOLUTION INTRAVENOUS EVERY 8 HOURS
Status: CANCELLED | OUTPATIENT
Start: 2025-03-06

## 2025-01-07 RX ORDER — HEPARIN SODIUM (PORCINE) LOCK FLUSH IV SOLN 100 UNIT/ML 100 UNIT/ML
5 SOLUTION INTRAVENOUS EVERY 8 HOURS
Status: DISCONTINUED | OUTPATIENT
Start: 2025-01-07 | End: 2025-01-07 | Stop reason: HOSPADM

## 2025-01-07 RX ORDER — LORAZEPAM 2 MG/ML
0.5 INJECTION INTRAMUSCULAR EVERY 4 HOURS PRN
Status: CANCELLED
Start: 2025-03-06

## 2025-01-07 RX ORDER — EPINEPHRINE 1 MG/ML
0.3 INJECTION, SOLUTION INTRAMUSCULAR; SUBCUTANEOUS EVERY 5 MIN PRN
Status: CANCELLED | OUTPATIENT
Start: 2025-03-06

## 2025-01-07 RX ADMIN — HEPARIN 5 ML: 100 SYRINGE at 11:08

## 2025-01-07 RX ADMIN — SODIUM CHLORIDE 500 MG: 9 INJECTION, SOLUTION INTRAVENOUS at 12:37

## 2025-01-07 RX ADMIN — HEPARIN 5 ML: 100 SYRINGE at 13:05

## 2025-01-07 ASSESSMENT — PAIN SCALES - GENERAL: PAINLEVEL_OUTOF10: NO PAIN (0)

## 2025-01-07 NOTE — PROGRESS NOTES
Infusion Nursing Note:  Hoda Brush presents today for Cycle 125 Herceptin.    Patient spoke with provider today: Yes: Dr. Aguiar    Treatment Conditions:  Lab Results   Component Value Date    HGB 12.8 01/07/2025    WBC 6.9 01/07/2025    ANEU 3.1 06/22/2021    ANEUTAUTO 3.5 01/07/2025     01/07/2025        Lab Results   Component Value Date     01/07/2025    POTASSIUM 4.2 01/07/2025    CR 0.86 01/07/2025    TAYLER 9.6 01/07/2025    BILITOTAL 0.3 01/07/2025    ALBUMIN 4.0 01/07/2025    ALT 61 (H) 01/07/2025    AST 69 (H) 01/07/2025       ECHO/MUGA completed 12/12/24  EF 55-60%.      Note: No concerns during infusion visit.  Met with Dr. Aguiar today.    Intravenous Access:  Implanted Port.    Post Infusion Assessment:  Patient tolerated infusion without incident.  Blood return noted pre and post infusion.  Access discontinued per protocol.    Discharge Plan:   Patient declined prescription refills.  Discharge instructions reviewed with: Patient.  Patient and/or family verbalized understanding of discharge instructions and all questions answered.  AVS to patient via [employer].  Patient will return 1/30/25 for next appointment.   Patient discharged in stable condition accompanied by: self.  Departure Mode: Ambulatory.      Linda Navarro RN

## 2025-01-07 NOTE — LETTER
1/7/2025      Hoda Brush  [address]  [facility] [address]      Dear Colleague,    Thank you for referring your patient, Hoda Brush, to the [facility]. Please see a copy of my visit note below.    ONCOLOGY NOTE     Hoda Brush  Female, 69 year old, 1955  MRN: [id number]        Hoda is a 69 year old patient from [facility] and is seen here for continuation of care for her metastatic ER+HER2- breast cancer.  She is a refugee from [facility] and was initially seen in clinic with her daughter.  The only data we have from [facility] is an English translation of her records.       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  On 02/11/2014, she was seen by an oncologist.  The clinical staging of T4b N2 M1 was noted.   Her breast cancer was on the left side. There was no right breast cancer, confirmed by the patient and her daughter, correcting a possible error in the translated records.  ER was positive in 100% of the cells, AK at 14% and HER2 was 3+ positive.  It appears that this histopathologic information is on the mastectomy specimen. She underwent an MRI for staging of presumptive bone metastases which was performed 03/02/2014.  There were skeletal metastases in the thoracic vertebrae at 12, L1, L3, L5 and S1 vertebral body, ranging in size from 0.7-3.0 cm in size.  Ischial and right femur were also involved, as well as a large iliac mass measuring about 15 cm in the uterus. 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   Radiation Oncology consultation was performed 03/11/2014, and she was given radiation in 1 dose of 6 Gy to the right iliac lesion.        With the initial diagnosis, she initiated treatment with 6 cycles of CAF neoadjuvant chemotherapy 02/14, 07/07, 03/28, 04/18/14, 05/08 and 05/29/14. She also received monthly zoledronic acid.  She then underwent a  modified left mastectomy of Palacios type and left lymphadenoectomy on 06/27/2014.   Pathologic examination showed number at [facility] was [id number] showed infiltrative grade 2 ductal cancer with 6 level 1 metastatic lympFollow up with Jossie for visits and trastuzumab on 2-5, 2-26, 3-19 with CBC, CMP.  Echocardiogram on 3-19.  Follow up with me 4-9 with CBC, CMP, CA27.29 and CEA and with CT CAP on 4-8.h nodes and 3 level 2 metastatic lymph nodes.  The differentiation was intermediate.  The tumor was ER positive 70% of the cells, SD positive in 40% of the cells and HER2 was 3+ by immunohistochemistry and the Ki-67 labeling index was 20%. Her staging after surgery was stage IV, pT4b N2 M1.  I don't see a biopsy of the skeletal metastases.  She then had continuation with chemotherapy with 4 cycles of Herceptin and taxane with monthly zoledronic acid.  Tamoxifen was initiated.  She then had two years of Herceptin and a decision was made not to continue further Herceptin.  She continued on zoledronic acid every 3 months. She was clinically stable. [place] then [place] the [place] new [place] from [facility].  She has now been changed to letrozole.  Denosumab has now been held for new diagnosis of osteonecrosis of the R maxilla.     History of cholecystectomy 2020.      TREATMENT HISTORY:  A. Initial diagnosis with metastatic breast cancer in [facility].  Neoadjuvant CAF x 6.   B. Left mastectomy. Left axillary node dissection.  C.  Radiation in 1 dose to R iliac region.  C. Herceptin for 2 years taxane for a prescribed course then stopped, monthly zoledronic acid.  She had 2 years of Herceptin with tamoxifen added after chemotherapy.  D.  Tamoxifen alone and zoledronic acid every 3 months starting 2014.  Letrozole was started   E.  [place]  We restarted Herceptin every 3 weeks and continued tamoxifen. Bone targeted agent changed to denosumab every 9 weeks. Zometa discontinued 2017.  Tamoxifen was changed to  letrozole August 2019.    F.  Xgeva discontinued 12-17-19 because of dental issues.   G.  J+J vaccine March, 2021.  H.  Was on Zometa once May 11.  Reaction with eye lid swelling. Discontinued Zometa.  H.  Restart Xgeva 6-22-21.  Continuing the trastuzumab and letrozole.  Both tolerated well.   I.  Discontinue Xgeva because of osteonecrosis of the maxilla.   Continue trastuzumab and letrozole.       INTERVAL HISTORY  She denies pain, fatigue, depression or anxiety.  She continues on trastuzumab, letrozole.  We have discontinued Xgeva because of osteonecrosis maxilla.  No pain, mild fatigue, no depression, no anxiety.  She has been feeling generally well.      She does have some cough she is going on for falling asleep.  This is probably gastroesophageal reflux.  She does take famotidine but it does not appear to help.  We can consider switching to Protonix.     REVIEW OF SYSTEMS:  A 10-point review of systems is entirely negative.     She is a eating a Mediterranean-style diet.  She is exercising by swimming.  I did advise adding a weightbearing exercise.  She takes vitamin D3 2000 International Units per day and calcium.  Her last DEXA scan performed a week ago shows a most valid negative T-score of -2.5 in the left hip, consistent with osteoporosis.     Notably, she has had no bowel or bladder problems.     PHYSICAL EXAMINATION:    VITALS: /81   Pulse 75   Temp 98.3  F (36.8  C) (Oral)   Resp 16   Wt 82.6 kg (182 lb 3.2 oz)   SpO2 96%   BMI 33.32 kg/m    GENERAL:  Hoda appeared generally well.  HEENT:  She has no alopecia.  Examination of oropharynx reveals good dentition.  LYMPH:  There is no cervical, supraclavicular, subclavicular or axillary lymphadenopathy.  BREASTS:  Right breast without masses.  Left mastectomy incision well healed with no erythema or masses.   LUNGS:  Clear to percussion and auscultation.  CARDIAC:  Heart has a regular rate and rhythm, S1, S2.  ABDOMEN:  Soft, nontender,  without hepatosplenomegaly.  EXTREMITIES:  Without edema.  PSYCH:  Mood and affect were normal.     LABORATORY DATA:  CMP and CBC within normal limits except ALT 61, AST 69.  CEA, CA27.29 pending.      CT Chest/Abdomen/Pelvis   FINDINGS:   LUNGS AND PLEURA: Stable 6 mm nodule in the inferior anterior right middle lobe (series 9, image 201) and 5 mm nodule in the central left lower lobe at the lung base (9/205). No new or enlarging nodules. No infiltrate or pleural effusion.  MEDIASTINUM/AXILLAE: Stable small bilateral axillary lymph nodes. No pathologically enlarged lymph nodes. Left mastectomy. Right IJ port catheter. No thoracic aortic aneurysm. No pericardial effusion. Small hiatal hernia.  CORONARY ARTERY CALCIFICATION: None.  HEPATOBILIARY: No focal lesions in the liver. Cholecystectomy.  PANCREAS: Normal.  SPLEEN: Normal.  ADRENAL GLANDS: Normal.  KIDNEYS/BLADDER: Normal.  BOWEL: No obstruction or inflammatory change.  LYMPH NODES: No lymphadenopathy.  VASCULATURE: No abdominal aortic aneurysm.  PELVIC ORGANS: Uterine fibroids. No free fluid or fluid collections.  MUSCULOSKELETAL: Stable appearance of diffuse sclerotic metastases including lesions in the ribs, spine, right femur and pelvis. No new lesions.                                                                      IMPRESSION:  1.  Stable sclerotic osseous metastases.  2.  Few stable pulmonary nodules.  3.  No new disease.        ASSESSMENT AND PLAN:       Hoda Brush is a 69-year-old woman with a history of ER-positive, VA-positive, HER2 positive breast cancer.  She is from [facility] and came to live in the [place] with her daughter.  The tumor is ER positive, VA positive and HER2 positive.  She had metastatic disease at the time of presentation with bone-only metastases by report.  She underwent neoadjuvant CAF, had a left mastectomy, left axillary lymph node dissection, radiation to the right hip, which included the right iliac  region where she had metastatic disease.  She was initially on tamoxifen but then was switched to letrozole.  She continues on this and every 3-week trastuzumab.  She has had no evidence of disease progression for the last 7 years and possible longer but we don't have detailed data from [facility].  Markers are low and stable.  We have continued Herceptin every 3 weeks as well as daily letrozole. CT CAP shows stable pulmonary nodules.   Hoda Brush continues to do well on a combination of trastuzumab and letrozole. She has no evidence of disease progression.   Her ejection fraction remains stable.   She continues to do well on trastuzumab and letrozole.  She has no evidence of disease progression.  She will continue with every 4-month CT of the chest, abdomen and pelvis.    The right maxillary osteonecrosis has healed and she is able to continue with Xgeva every 3 months. Hold the Xgeva and repeat Dexa scan in 2025.    Continue the letrozole and trastuzuamb.  Letrozole has been well-tolerated, as has trastuzuamb.  No significant joint stiffness.  Echo. Stable EF.  60-65%.  Discussion of bone health and right maxillary osteonecrosis.  All healed.   Xgeva is being continued.  Chronic cough probably related to gastroesophageal reflux.  Will change famotidine to Protonix and see if there is improvement.  Referral to pulmonology.  Follow up.  Continue Herceptin through the end of 2025 alternating providers every 9 weeks. CT CAP every 4 months.  Follow up with Kassidy March 13 with CBC, CMP, CA27.29 and CEA.  Xgeva discontinued because of dental work.  Dexa  July 25 2025.  CBC, CMP every 3 weeks and CA27.29 and CEA every 6 weeks. Echo every 3 months, next on March 12.  CT CAP every 4 months, next May 14 with visit with me on May 15 with trastuzumab cycle 131.        Thank you for allowing us to participate in this patient's care.       Sincerely,      Lisandro Aguiar MD  Professor  [employer]   [place]  [phone]           I spent 35 minutes with the patient more than 50% of which was in counseling and coordination of care.      Again, thank you for allowing me to participate in the care of your patient.        Sincerely,        Lisandro Aguiar MD    Electronically signed

## 2025-01-07 NOTE — NURSING NOTE
"Oncology Rooming Note    January 7, 2025 11:24 AM   Hoda Brush is a 69 year old female who presents for:    Chief Complaint   Patient presents with    Blood Draw     Port blood draw by lab RN    Oncology Clinic Visit     RN Breast CA      Initial Vitals: /81   Pulse 75   Temp 98.3  F (36.8  C) (Oral)   Resp 16   Wt 82.6 kg (182 lb 3.2 oz)   SpO2 96%   BMI 33.32 kg/m   Estimated body mass index is 33.32 kg/m  as calculated from the following:    Height as of 11/21/24: 1.575 m (5' 2\").    Weight as of this encounter: 82.6 kg (182 lb 3.2 oz). Body surface area is 1.9 meters squared.  No Pain (0) Comment: Data Unavailable   No LMP recorded. Patient is postmenopausal.  Allergies reviewed: Yes  Medications reviewed: Yes    Medications: Medication refills not needed today.  Pharmacy name entered into [employer]: [id number] - [place], [place] - [address]    Frailty Screening:   Is the patient here for a new oncology consult visit in cancer care? 2. No      Clinical concerns: none      Araceli Casillas             "

## 2025-01-08 DIAGNOSIS — R05.3 CHRONIC COUGH: Primary | ICD-10-CM

## 2025-01-08 LAB — CANCER AG27-29 SERPL-ACNC: 13.8 U/ML

## 2025-01-20 ENCOUNTER — OFFICE VISIT (OUTPATIENT)
Dept: PULMONOLOGY | Facility: CLINIC | Age: 70
End: 2025-01-20
Payer: COMMERCIAL

## 2025-01-20 DIAGNOSIS — R05.3 CHRONIC COUGH: ICD-10-CM

## 2025-01-20 PROCEDURE — 94150 VITAL CAPACITY TEST: CPT | Performed by: INTERNAL MEDICINE

## 2025-01-20 PROCEDURE — 94375 RESPIRATORY FLOW VOLUME LOOP: CPT | Performed by: INTERNAL MEDICINE

## 2025-01-20 PROCEDURE — 94729 DIFFUSING CAPACITY: CPT | Performed by: INTERNAL MEDICINE

## 2025-01-20 PROCEDURE — 94726 PLETHYSMOGRAPHY LUNG VOLUMES: CPT | Performed by: INTERNAL MEDICINE

## 2025-01-21 ENCOUNTER — OFFICE VISIT (OUTPATIENT)
Dept: PULMONOLOGY | Facility: CLINIC | Age: 70
End: 2025-01-21
Attending: INTERNAL MEDICINE
Payer: COMMERCIAL

## 2025-01-21 VITALS
HEIGHT: 62 IN | OXYGEN SATURATION: 94 % | DIASTOLIC BLOOD PRESSURE: 78 MMHG | BODY MASS INDEX: 33.34 KG/M2 | WEIGHT: 181.2 LBS | HEART RATE: 95 BPM | SYSTOLIC BLOOD PRESSURE: 128 MMHG

## 2025-01-21 DIAGNOSIS — K21.9 GASTROESOPHAGEAL REFLUX DISEASE WITHOUT ESOPHAGITIS: ICD-10-CM

## 2025-01-21 DIAGNOSIS — R05.3 CHRONIC COUGH: Primary | ICD-10-CM

## 2025-01-21 DIAGNOSIS — J45.20 MILD INTERMITTENT REACTIVE AIRWAY DISEASE: ICD-10-CM

## 2025-01-21 LAB
DLCOCOR-%PRED-PRE: 130 %
DLCOCOR-PRE: 23.55 ML/MIN/MMHG
DLCOUNC-%PRED-PRE: 127 %
DLCOUNC-PRE: 23.1 ML/MIN/MMHG
DLCOUNC-PRED: 18.11 ML/MIN/MMHG
ERV-%PRED-PRE: 7 %
ERV-PRE: 0.07 L
ERV-PRED: 0.92 L
EXPTIME-PRE: 6.36 SEC
FEF2575-%PRED-PRE: 73 %
FEF2575-PRE: 1.28 L/SEC
FEF2575-PRED: 1.73 L/SEC
FEFMAX-%PRED-PRE: 95 %
FEFMAX-PRE: 5.2 L/SEC
FEFMAX-PRED: 5.42 L/SEC
FEV1-%PRED-PRE: 86 %
FEV1-PRE: 1.69 L
FEV1FEV6-PRE: 75 %
FEV1FEV6-PRED: 79 %
FEV1FVC-PRE: 75 %
FEV1FVC-PRED: 79 %
FEV1SVC-PRE: 74 %
FEV1SVC-PRED: 67 %
FIFMAX-PRE: 2.73 L/SEC
FRCPLETH-%PRED-PRE: 99 %
FRCPLETH-PRE: 2.68 L
FRCPLETH-PRED: 2.7 L
FVC-%PRED-PRE: 91 %
FVC-PRE: 2.26 L
FVC-PRED: 2.47 L
IC-%PRED-PRE: 119 %
IC-PRE: 2.21 L
IC-PRED: 1.84 L
RVPLETH-%PRED-PRE: 133 %
RVPLETH-PRE: 2.61 L
RVPLETH-PRED: 1.95 L
TLCPLETH-%PRED-PRE: 106 %
TLCPLETH-PRE: 4.88 L
TLCPLETH-PRED: 4.6 L
VA-%PRED-PRE: 90 %
VA-PRE: 3.88 L
VC-%PRED-PRE: 78 %
VC-PRE: 2.28 L
VC-PRED: 2.9 L

## 2025-01-21 PROCEDURE — 99204 OFFICE O/P NEW MOD 45 MIN: CPT | Performed by: NURSE PRACTITIONER

## 2025-01-21 NOTE — PROGRESS NOTES
Pulmonary Clinic Consultation          Assessment/Plan:     69 year old female with a history of metastatic L breast cancer s/p L mastectomy-chemo-radiation, presenting for evaluation of chronic cough.      Chronic cough  Pulmonary nodules  GERD  Lifelong non-smoker presents for evaluation of cough for the past 1.5 months, no preceding event.  She also endorses some difficulty taking a deep breath.  She reported cough when falling asleep, to her oncologist.  Her cough was thought due to GERD, started on protonix, but also referred to pulmonology.  She has not noticed a difference with the protonix.  EGD in 2020 with hiatal hernia and gastritis.   Eosinophils 0.3 on 1/7/25.  Chest CT 1/6/25 with stable 6mm nodules in RML and 5mm in LLL, otherwise normal lung parenchyma and airways.  These nodules have been noted to be stable since 2020.  There is a small hiatal hernia.   Pulmonary function testing with normal spirometry, slight air-trapping, and normal diffusion capacity.  It's possible that she has some mild intermittent reactive airways, due to mild air-trapping, although keep in mind that she has hiatal hernia and possible GERD that could be causing cough as well.     Plan:  - reviewed PFT results with patient today.  - start Dulera (mometasone/formoterol) 100/5, two inhalations BID, rinse/gargle after use.  - continue protonix per oncology.   - if no benefit from inhaler, most likely her cough is due to GERD - could refer to GI, or perform esophagram in the meantime.     Follow-up:  - one month virtually    Gwen Kim CNP  Pulmonary Medicine  [facility]  [phone]       CC:     Cough     HPI:     69 year old female with a history of metastatic L breast cancer s/p L mastectomy-chemo-radiation, presenting for evaluation of chronic cough.      Cough for 1.5 months.  No illness prior.  No medication changes.   Never had this happen before.   Difficult to take deep  breaths.  When she does take deep breaths, start coughing.  When laying flat, wheezing.  Also when laying on sides.  No SOB.  Shallow, little breaths.   No concerns with breathing when younger.  No frequent illness.  No lingering symptoms.  Hx of bronchitis, but only once.   Protonix x1 month, hasn't noticed much difference.  No real heartburn issues.   No sinus drainage currently, but in past - spring, sometimes summer.   Some concern for allergies.  Sneezing and sinus drainage.  Takes claritin, does help.         Medical records reviewed for this visit include oncology notes.     ROS:     A 12-system review was obtained and was negative with the exception of the symptoms endorsed in the HPI.       Medical history:       PMH:  Past Medical History:   Diagnosis Date    Breast cancer metastasized to bone (H)     Lymphedema of left upper extremity        PSH:  Past Surgical History:   Procedure Laterality Date    APPENDECTOMY      BIOPSY  2014    COLONOSCOPY N/A 2/1/2018    Procedure: COLONOSCOPY;  Colonoscopy;  Surgeon: Phillip Rowe MD;  Location:  GI    ESOPHAGOSCOPY, GASTROSCOPY, DUODENOSCOPY (EGD), COMBINED N/A 2/16/2018    Procedure: COMBINED ESOPHAGOSCOPY, GASTROSCOPY, DUODENOSCOPY (EGD), BIOPSY SINGLE OR MULTIPLE;;  Surgeon: Paty Herman MD;  Location:  GI    ESOPHAGOSCOPY, GASTROSCOPY, DUODENOSCOPY (EGD), COMBINED N/A 2/5/2020    Procedure: ESOPHAGOGASTRODUODENOSCOPY (EGD);  Surgeon: Anthony Alonso MD;  Location:  GI    INSERT PORT VASCULAR ACCESS Right 9/15/2017    Procedure: INSERT PORT VASCULAR ACCESS;  Central venous chest port placement, right;  Surgeon: Dmitriy Hernandez PA-C;  Location: [facility] OR    LAPAROSCOPIC CHOLECYSTECTOMY N/A 8/31/2019    Procedure: LAPAROSCOPIC CHOLECYSTECTOMY;  Surgeon: Maldonado Billy MD;  Location: [facility] OR    MASTECTOMY Left 06/27/2014    and lymph node resection    MASTECTOMY SIMPLE BILATERAL      Mastectomy 06/27/2014        Allergies:  Allergies   Allergen Reactions    Zometa [Zoledronic Acid] Swelling       Family Hx:  Family History   Problem Relation Age of Onset    Breast Cancer Mother 45    Lung Cancer Father         smoker    Breast Cancer Sister 61       Social Hx:  Social History     Socioeconomic History    Marital status:      Spouse name: Not on file    Number of children: Not on file    Years of education: Not on file    Highest education level: Not on file   Occupational History    Not on file   Tobacco Use    Smoking status: Never     Passive exposure: Never    Smokeless tobacco: Never   Vaping Use    Vaping status: Never Used   Substance and Sexual Activity    Alcohol use: No    Drug use: No    Sexual activity: Not Currently     Partners: Male   Other Topics Concern    Parent/sibling w/ CABG, MI or angioplasty before 65F 55M? No   Social History Narrative    Not on file     Social Drivers of Health     Financial Resource Strain: Low Risk  (5/13/2024)    Financial Resource Strain     Within the past 12 months, have you or your family members you live with been unable to get utilities (heat, electricity) when it was really needed?: No   Food Insecurity: Low Risk  (5/13/2024)    Food Insecurity     Within the past 12 months, did you worry that your food would run out before you got money to buy more?: No     Within the past 12 months, did the food you bought just not last and you didn t have money to get more?: No   Transportation Needs: Low Risk  (5/13/2024)    Transportation Needs     Within the past 12 months, has lack of transportation kept you from medical appointments, getting your medicines, non-medical meetings or appointments, work, or from getting things that you need?: No   Physical Activity: Unknown (5/13/2024)    Exercise Vital Sign     Days of Exercise per Week: Patient declined     Minutes of Exercise per Session: 30 min   Stress: No Stress Concern Present (5/13/2024)    [facility] of  Occupational Health - Occupational Stress Questionnaire     Feeling of Stress : Not at all   Social Connections: Unknown (5/13/2024)    Social Connection and Isolation Panel [NHANES]     Frequency of Communication with Friends and Family: Not on file     Frequency of Social Gatherings with Friends and Family: Twice a week     Attends [employer] Services: Not on file     Active Member of Clubs or Organizations: Not on file     Attends Club or Organization Meetings: Not on file     Marital Status: Not on file   Interpersonal Safety: Low Risk  (11/12/2024)    Interpersonal Safety     Do you feel physically and emotionally safe where you currently live?: Yes     Within the past 12 months, have you been hit, slapped, kicked or otherwise physically hurt by someone?: No     Within the past 12 months, have you been humiliated or emotionally abused in other ways by your partner or ex-partner?: No   Housing Stability: Low Risk  (5/13/2024)    Housing Stability     Do you have housing? : Yes     Are you worried about losing your housing?: No       Current Meds:  Current Outpatient Medications   Medication Sig Dispense Refill    acetaminophen (TYLENOL) 500 MG tablet Take 1,000 mg by mouth every 6 hours as needed for mild pain      calcium carbonate (OS-TAYLER) 500 MG tablet Take 1 tablet by mouth daily      fluticasone (FLONASE) 50 MCG/ACT nasal spray Spray 2 sprays into both nostrils daily 16 g 3    letrozole (FEMARA) 2.5 MG tablet Take 1 tablet (2.5 mg) by mouth daily 90 tablet 3    mometasone-formoterol (DULERA) 100-5 MCG/ACT inhaler Inhale 2 puffs into the lungs 2 times daily. 13 g 4    order for DME Equipment being ordered: 2 Mastectomy bras and 1 prosthetheses. 2 Piece 0    pantoprazole (PROTONIX) 40 MG EC tablet Take 1 tablet (40 mg) by mouth daily. 90 tablet 3    traZODone (DESYREL) 50 MG tablet TAKE 1/2 TABLET(25 MG) BY MOUTH EVERY NIGHT AS NEEDED FOR SLEEP 45 tablet 2    VITAMIN D, CHOLECALCIFEROL, PO Take 1,000 Units by  "mouth daily            Physical Exam:     /78 (BP Location: Right arm, Patient Position: Sitting, Cuff Size: Adult Large)   Pulse 95   Ht 1.575 m (5' 2\")   Wt 82.2 kg (181 lb 3.2 oz)   SpO2 94%   BMI 33.14 kg/m    Gen: adult female, appears in NAD  HEENT: clear conjunctivae, moist mucous membranes, clearing throat during exam.  CV: RRR, no M/G/R  Resp: CTAB, no focal crackles or wheezes.  Respirations even and unlabored.  On RA.  Cough with deep breathing.  Skin: no apparent rashes on visible skin  Ext: no cyanosis, clubbing or edema  Neuro: alert and answering questions appropriately       Data:     Labs:  reviewed    Imaging studies:  I have personally reviewed all pertinent imaging studies and PFT results unless otherwise noted.    Recent Results (from the past 744 hours)   CT Chest/Abdomen/Pelvis w Contrast    Narrative    EXAM: CT CHEST/ABDOMEN/PELVIS W CONTRAST  LOCATION: [facility]  DATE: 1/6/2025    INDICATION: 68 yo female with met breast cancer to bone on hercepting and letrozole.  COMPARISON: 9/5/2024  TECHNIQUE: CT scan of the chest, abdomen, and pelvis was performed following injection of IV contrast. Multiplanar reformats were obtained. Dose reduction techniques were used.   CONTRAST: 89ml isovue 370    FINDINGS:   LUNGS AND PLEURA: Stable 6 mm nodule in the inferior anterior right middle lobe (series 9, image 201) and 5 mm nodule in the central left lower lobe at the lung base (9/205). No new or enlarging nodules. No infiltrate or pleural effusion.    MEDIASTINUM/AXILLAE: Stable small bilateral axillary lymph nodes. No pathologically enlarged lymph nodes. Left mastectomy. Right IJ port catheter. No thoracic aortic aneurysm. No pericardial effusion. Small hiatal hernia.    CORONARY ARTERY CALCIFICATION: None.    HEPATOBILIARY: No focal lesions in the liver. Cholecystectomy.    PANCREAS: Normal.    SPLEEN: Normal.    ADRENAL GLANDS: Normal.    KIDNEYS/BLADDER: " Normal.    BOWEL: No obstruction or inflammatory change.    LYMPH NODES: No lymphadenopathy.    VASCULATURE: No abdominal aortic aneurysm.    PELVIC ORGANS: Uterine fibroids. No free fluid or fluid collections.    MUSCULOSKELETAL: Stable appearance of diffuse sclerotic metastases including lesions in the ribs, spine, right femur and pelvis. No new lesions.      Impression    IMPRESSION:  1.  Stable sclerotic osseous metastases.  2.  Few stable pulmonary nodules.  3.  No new disease.         Pulmonary Function Testing

## 2025-01-21 NOTE — PATIENT INSTRUCTIONS
It was a pleasure to see you in clinic today.   Here is what we discussed:    Start Dulera two puffs twice daily, rinse/gargle after use.   Call my nurse, Ganesh ([phone]) with any change or worsening of your breathing.  Follow-up virtually in one month.     Gwen Kim, CNP  Pulmonary Medicine  [facility]  [phone]

## 2025-01-29 ENCOUNTER — PATIENT OUTREACH (OUTPATIENT)
Dept: GERIATRIC MEDICINE | Facility: CLINIC | Age: 70
End: 2025-01-29
Payer: COMMERCIAL

## 2025-01-29 NOTE — LETTER
January 29, 2025    NATASHA LOUIS  [address]   [place] [address]    Dear Natasha:     I m your care coordinator. I ve been unable to reach you by phone. I am writing to ask you or your authorized representative to call me at [phone]. If you reach my voicemail, leave a message with your daytime phone number. Include a date and time that I can call you. If you are hearing impaired, call the [place] [employer] at 558 or [phone] ([id number] relay service).    The reason I am trying to reach you is:     [] To schedule an assessment  [x] For your six (6)-month check-in  [] Other:      Please call me as soon as you receive this letter. I look forward to speaking with you.    Sincerely,    Lakesha Ventura RN, BSN, PHN  [phone]  [email or web address]                                                                       <[email or web address]>    (08/2024)

## 2025-01-29 NOTE — PROGRESS NOTES
"[facility] Care Coordination Contact    Per CC, mailed client an \"Unable to Contact\" letter.    Tanya Zamarripa    Care Management Specialist   [facility]  [phone]       "

## 2025-01-30 ENCOUNTER — APPOINTMENT (OUTPATIENT)
Dept: LAB | Facility: CLINIC | Age: 70
End: 2025-01-30
Attending: INTERNAL MEDICINE
Payer: COMMERCIAL

## 2025-01-30 ENCOUNTER — PATIENT OUTREACH (OUTPATIENT)
Dept: GERIATRIC MEDICINE | Facility: CLINIC | Age: 70
End: 2025-01-30

## 2025-01-30 ENCOUNTER — INFUSION THERAPY VISIT (OUTPATIENT)
Dept: ONCOLOGY | Facility: CLINIC | Age: 70
End: 2025-01-30
Attending: INTERNAL MEDICINE
Payer: COMMERCIAL

## 2025-01-30 VITALS
DIASTOLIC BLOOD PRESSURE: 82 MMHG | HEART RATE: 76 BPM | SYSTOLIC BLOOD PRESSURE: 131 MMHG | OXYGEN SATURATION: 95 % | HEIGHT: 63 IN | WEIGHT: 182.8 LBS | TEMPERATURE: 97.9 F | RESPIRATION RATE: 16 BRPM | BODY MASS INDEX: 32.39 KG/M2

## 2025-01-30 DIAGNOSIS — C50.912 MALIGNANT NEOPLASM OF LEFT FEMALE BREAST, UNSPECIFIED ESTROGEN RECEPTOR STATUS, UNSPECIFIED SITE OF BREAST (H): Primary | ICD-10-CM

## 2025-01-30 LAB
ALBUMIN SERPL BCG-MCNC: 4 G/DL (ref 3.5–5.2)
ALP SERPL-CCNC: 64 U/L (ref 40–150)
ALT SERPL W P-5'-P-CCNC: 53 U/L (ref 0–50)
ANION GAP SERPL CALCULATED.3IONS-SCNC: 9 MMOL/L (ref 7–15)
AST SERPL W P-5'-P-CCNC: 54 U/L (ref 0–45)
BASOPHILS # BLD AUTO: 0.1 10E3/UL (ref 0–0.2)
BASOPHILS NFR BLD AUTO: 1 %
BILIRUB SERPL-MCNC: 0.2 MG/DL
BUN SERPL-MCNC: 14.2 MG/DL (ref 8–23)
CALCIUM SERPL-MCNC: 9.1 MG/DL (ref 8.8–10.4)
CEA SERPL-MCNC: 1.2 NG/ML
CHLORIDE SERPL-SCNC: 105 MMOL/L (ref 98–107)
CREAT SERPL-MCNC: 0.82 MG/DL (ref 0.51–0.95)
EGFRCR SERPLBLD CKD-EPI 2021: 77 ML/MIN/1.73M2
EOSINOPHIL # BLD AUTO: 0.3 10E3/UL (ref 0–0.7)
EOSINOPHIL NFR BLD AUTO: 4 %
ERYTHROCYTE [DISTWIDTH] IN BLOOD BY AUTOMATED COUNT: 13.4 % (ref 10–15)
GLUCOSE SERPL-MCNC: 87 MG/DL (ref 70–99)
HCO3 SERPL-SCNC: 27 MMOL/L (ref 22–29)
HCT VFR BLD AUTO: 38.9 % (ref 35–47)
HGB BLD-MCNC: 12.4 G/DL (ref 11.7–15.7)
IMM GRANULOCYTES # BLD: 0.1 10E3/UL
IMM GRANULOCYTES NFR BLD: 1 %
LYMPHOCYTES # BLD AUTO: 2.9 10E3/UL (ref 0.8–5.3)
LYMPHOCYTES NFR BLD AUTO: 39 %
MCH RBC QN AUTO: 29.7 PG (ref 26.5–33)
MCHC RBC AUTO-ENTMCNC: 31.9 G/DL (ref 31.5–36.5)
MCV RBC AUTO: 93 FL (ref 78–100)
MONOCYTES # BLD AUTO: 0.6 10E3/UL (ref 0–1.3)
MONOCYTES NFR BLD AUTO: 8 %
NEUTROPHILS # BLD AUTO: 3.7 10E3/UL (ref 1.6–8.3)
NEUTROPHILS NFR BLD AUTO: 49 %
NRBC # BLD AUTO: 0 10E3/UL
NRBC BLD AUTO-RTO: 0 /100
PLATELET # BLD AUTO: 229 10E3/UL (ref 150–450)
POTASSIUM SERPL-SCNC: 4.4 MMOL/L (ref 3.4–5.3)
PROT SERPL-MCNC: 7.6 G/DL (ref 6.4–8.3)
RBC # BLD AUTO: 4.17 10E6/UL (ref 3.8–5.2)
SODIUM SERPL-SCNC: 141 MMOL/L (ref 135–145)
WBC # BLD AUTO: 7.6 10E3/UL (ref 4–11)

## 2025-01-30 PROCEDURE — 36591 DRAW BLOOD OFF VENOUS DEVICE: CPT

## 2025-01-30 PROCEDURE — 86300 IMMUNOASSAY TUMOR CA 15-3: CPT

## 2025-01-30 PROCEDURE — 85025 COMPLETE CBC W/AUTO DIFF WBC: CPT

## 2025-01-30 PROCEDURE — 82378 CARCINOEMBRYONIC ANTIGEN: CPT

## 2025-01-30 PROCEDURE — 258N000003 HC RX IP 258 OP 636: Performed by: INTERNAL MEDICINE

## 2025-01-30 PROCEDURE — 250N000011 HC RX IP 250 OP 636: Performed by: INTERNAL MEDICINE

## 2025-01-30 PROCEDURE — 82310 ASSAY OF CALCIUM: CPT

## 2025-01-30 PROCEDURE — 82040 ASSAY OF SERUM ALBUMIN: CPT

## 2025-01-30 RX ORDER — HEPARIN SODIUM (PORCINE) LOCK FLUSH IV SOLN 100 UNIT/ML 100 UNIT/ML
5 SOLUTION INTRAVENOUS DAILY PRN
Status: DISCONTINUED | OUTPATIENT
Start: 2025-01-30 | End: 2025-01-30 | Stop reason: HOSPADM

## 2025-01-30 RX ORDER — HEPARIN SODIUM (PORCINE) LOCK FLUSH IV SOLN 100 UNIT/ML 100 UNIT/ML
5 SOLUTION INTRAVENOUS
Status: DISCONTINUED | OUTPATIENT
Start: 2025-01-30 | End: 2025-01-30 | Stop reason: HOSPADM

## 2025-01-30 RX ADMIN — HEPARIN 5 ML: 100 SYRINGE at 11:32

## 2025-01-30 RX ADMIN — SODIUM CHLORIDE 500 MG: 9 INJECTION, SOLUTION INTRAVENOUS at 12:32

## 2025-01-30 RX ADMIN — HEPARIN 5 ML: 100 SYRINGE at 13:01

## 2025-01-30 ASSESSMENT — PAIN SCALES - GENERAL: PAINLEVEL_OUTOF10: NO PAIN (0)

## 2025-01-30 NOTE — PROGRESS NOTES
Infusion Nursing Note:  Hoda Brush presents today for (actual) Cycle 126 Day 1 trastuzumab-qyyp.    Patient seen by provider today: No   present during visit today: Not Applicable.    Note: Hoda presents today feeling well. Denies pain or nausea/vomiting. Denies fevers/chills. Denies SOB, cough, chest pain, or dizziness/lightheadedness. Denies constipation/diarrhea. Denies urinary issues. Denies neuropathy. Offers no new concerns at this appointment.    New treatment plan is a continuation of her previous trastuzumab plan. Last dose 01/02/25. Does not require reloading or premedications per discussion with pharmacy.      Intravenous Access:  Implanted Port.    Treatment Conditions:     Latest Reference Range & Units 01/30/25 11:38   Sodium 135 - 145 mmol/L 141   Potassium 3.4 - 5.3 mmol/L 4.4   Chloride 98 - 107 mmol/L 105   Carbon Dioxide (CO2) 22 - 29 mmol/L 27   Urea Nitrogen 8.0 - 23.0 mg/dL 14.2   Creatinine 0.51 - 0.95 mg/dL 0.82   GFR Estimate >60 mL/min/1.73m2 77   Calcium 8.8 - 10.4 mg/dL 9.1   Anion Gap 7 - 15 mmol/L 9   Albumin 3.5 - 5.2 g/dL 4.0   Protein Total 6.4 - 8.3 g/dL 7.6   Alkaline Phosphatase 40 - 150 U/L 64   ALT 0 - 50 U/L 53 (H)   AST 0 - 45 U/L 54 (H)   Bilirubin Total <=1.2 mg/dL 0.2   Glucose 70 - 99 mg/dL 87   WBC 4.0 - 11.0 10e3/uL 7.6   Hemoglobin 11.7 - 15.7 g/dL 12.4   Hematocrit 35.0 - 47.0 % 38.9   Platelet Count 150 - 450 10e3/uL 229   RBC Count 3.80 - 5.20 10e6/uL 4.17   MCV 78 - 100 fL 93   MCH 26.5 - 33.0 pg 29.7   MCHC 31.5 - 36.5 g/dL 31.9   RDW 10.0 - 15.0 % 13.4   % Neutrophils % 49   % Lymphocytes % 39   % Monocytes % 8   % Eosinophils % 4   % Basophils % 1   Absolute Basophils 0.0 - 0.2 10e3/uL 0.1   Absolute Eosinophils 0.0 - 0.7 10e3/uL 0.3   Absolute Immature Granulocytes <=0.4 10e3/uL 0.1   Absolute Lymphocytes 0.8 - 5.3 10e3/uL 2.9   Absolute Monocytes 0.0 - 1.3 10e3/uL 0.6   % Immature Granulocytes % 1   Absolute Neutrophils 1.6 - 8.3 10e3/uL  3.7   Absolute NRBCs 10e3/uL 0.0   NRBCs per 100 WBC <1 /100 0     ECHO/MUGA completed 12/12/24  EF 55-60%.      Post Infusion Assessment:  Patient tolerated infusion without incident.  Blood return noted pre and post infusion.  Site patent and intact, free from redness, edema or discomfort.  No evidence of extravasations.  Access discontinued per protocol.       Discharge Plan:   Patient declined prescription refills.  Discharge instructions reviewed with: Patient.  Patient and/or family verbalized understanding of discharge instructions and all questions answered.  AVS to patient via [employer].  Patient will return 02/20 for next infusion appointment.   Patient discharged in stable condition accompanied by: self.  Departure Mode: Ambulatory.      Olivia Bains RN

## 2025-01-30 NOTE — PATIENT INSTRUCTIONS
[facility] Triage and after hours / weekends / holidays:  [phone]    Please call the triage or after hours line if you experience a temperature greater than or equal to 100.4, shaking chills, have uncontrolled nausea, vomiting and/or diarrhea, dizziness, shortness of breath, chest pain, bleeding, unexplained bruising, or if you have any other new/concerning symptoms, questions, or concerns.      If you are having any concerning symptoms or wish to speak to a provider before your next infusion visit, please call your care coordinator or triage to notify them so we can adequately serve you.     If you need a refill on a narcotic prescription or other medication, please call before your infusion appointment.

## 2025-01-30 NOTE — PROGRESS NOTES
[facility] Care Coordination Contact    Called member to complete six month assessment and left a message requesting a return call.    aLkesha Ventura RN  [facility]  [phone]

## 2025-02-02 ENCOUNTER — TRANSFERRED RECORDS (OUTPATIENT)
Dept: HEALTH INFORMATION MANAGEMENT | Facility: CLINIC | Age: 70
End: 2025-02-02

## 2025-02-08 ENCOUNTER — MYC MEDICAL ADVICE (OUTPATIENT)
Dept: ONCOLOGY | Facility: CLINIC | Age: 70
End: 2025-02-08
Payer: COMMERCIAL

## 2025-02-10 ENCOUNTER — HOSPITAL ENCOUNTER (EMERGENCY)
Facility: CLINIC | Age: 70
Discharge: HOME | End: 2025-02-10
Attending: STUDENT IN AN ORGANIZED HEALTH CARE EDUCATION/TRAINING PROGRAM
Payer: COMMERCIAL

## 2025-02-10 ENCOUNTER — APPOINTMENT (OUTPATIENT)
Dept: MRI IMAGING | Facility: CLINIC | Age: 70
End: 2025-02-10
Attending: STUDENT IN AN ORGANIZED HEALTH CARE EDUCATION/TRAINING PROGRAM
Payer: COMMERCIAL

## 2025-02-10 VITALS
HEART RATE: 84 BPM | SYSTOLIC BLOOD PRESSURE: 136 MMHG | OXYGEN SATURATION: 95 % | RESPIRATION RATE: 15 BRPM | WEIGHT: 182 LBS | DIASTOLIC BLOOD PRESSURE: 84 MMHG | TEMPERATURE: 97.5 F | BODY MASS INDEX: 32.05 KG/M2

## 2025-02-10 DIAGNOSIS — M54.50 MIDLINE LOW BACK PAIN, UNSPECIFIED CHRONICITY, UNSPECIFIED WHETHER SCIATICA PRESENT: ICD-10-CM

## 2025-02-10 LAB
ALBUMIN SERPL BCG-MCNC: 4.3 G/DL (ref 3.5–5.2)
ALBUMIN UR-MCNC: 50 MG/DL
ALP SERPL-CCNC: 69 U/L (ref 40–150)
ALT SERPL W P-5'-P-CCNC: 52 U/L (ref 0–50)
ANION GAP SERPL CALCULATED.3IONS-SCNC: 12 MMOL/L (ref 7–15)
APPEARANCE UR: CLEAR
AST SERPL W P-5'-P-CCNC: 44 U/L (ref 0–45)
BACTERIA #/AREA URNS HPF: ABNORMAL /HPF
BASOPHILS # BLD AUTO: 0 10E3/UL (ref 0–0.2)
BASOPHILS NFR BLD AUTO: 1 %
BILIRUB SERPL-MCNC: 0.3 MG/DL
BILIRUB UR QL STRIP: NEGATIVE
BUN SERPL-MCNC: 15.6 MG/DL (ref 8–23)
CALCIUM SERPL-MCNC: 9.8 MG/DL (ref 8.8–10.4)
CHLORIDE SERPL-SCNC: 103 MMOL/L (ref 98–107)
COLOR UR AUTO: YELLOW
CREAT SERPL-MCNC: 0.94 MG/DL (ref 0.51–0.95)
CRP SERPL-MCNC: 3.59 MG/L
EGFRCR SERPLBLD CKD-EPI 2021: 66 ML/MIN/1.73M2
EOSINOPHIL # BLD AUTO: 0.3 10E3/UL (ref 0–0.7)
EOSINOPHIL NFR BLD AUTO: 3 %
ERYTHROCYTE [DISTWIDTH] IN BLOOD BY AUTOMATED COUNT: 13.4 % (ref 10–15)
ERYTHROCYTE [SEDIMENTATION RATE] IN BLOOD BY WESTERGREN METHOD: 27 MM/HR (ref 0–30)
GLUCOSE SERPL-MCNC: 113 MG/DL (ref 70–99)
GLUCOSE UR STRIP-MCNC: NEGATIVE MG/DL
HCO3 SERPL-SCNC: 27 MMOL/L (ref 22–29)
HCT VFR BLD AUTO: 43.9 % (ref 35–47)
HGB BLD-MCNC: 14 G/DL (ref 11.7–15.7)
HGB UR QL STRIP: NEGATIVE
HYALINE CASTS: 1 /LPF
IMM GRANULOCYTES # BLD: 0 10E3/UL
IMM GRANULOCYTES NFR BLD: 0 %
KETONES UR STRIP-MCNC: NEGATIVE MG/DL
LEUKOCYTE ESTERASE UR QL STRIP: ABNORMAL
LYMPHOCYTES # BLD AUTO: 2.9 10E3/UL (ref 0.8–5.3)
LYMPHOCYTES NFR BLD AUTO: 37 %
MCH RBC QN AUTO: 29.5 PG (ref 26.5–33)
MCHC RBC AUTO-ENTMCNC: 31.9 G/DL (ref 31.5–36.5)
MCV RBC AUTO: 93 FL (ref 78–100)
MONOCYTES # BLD AUTO: 0.5 10E3/UL (ref 0–1.3)
MONOCYTES NFR BLD AUTO: 6 %
MUCOUS THREADS #/AREA URNS LPF: PRESENT /LPF
NEUTROPHILS # BLD AUTO: 4.3 10E3/UL (ref 1.6–8.3)
NEUTROPHILS NFR BLD AUTO: 54 %
NITRATE UR QL: NEGATIVE
NRBC # BLD AUTO: 0 10E3/UL
NRBC BLD AUTO-RTO: 0 /100
PH UR STRIP: 5.5 [PH] (ref 5–7)
PLATELET # BLD AUTO: 160 10E3/UL (ref 150–450)
POTASSIUM SERPL-SCNC: 4.2 MMOL/L (ref 3.4–5.3)
PROT SERPL-MCNC: 8.3 G/DL (ref 6.4–8.3)
RBC # BLD AUTO: 4.74 10E6/UL (ref 3.8–5.2)
RBC URINE: 7 /HPF
SODIUM SERPL-SCNC: 142 MMOL/L (ref 135–145)
SP GR UR STRIP: 1.03 (ref 1–1.03)
UROBILINOGEN UR STRIP-MCNC: NORMAL MG/DL
WBC # BLD AUTO: 8 10E3/UL (ref 4–11)
WBC URINE: 34 /HPF

## 2025-02-10 PROCEDURE — 86140 C-REACTIVE PROTEIN: CPT | Performed by: STUDENT IN AN ORGANIZED HEALTH CARE EDUCATION/TRAINING PROGRAM

## 2025-02-10 PROCEDURE — 99285 EMERGENCY DEPT VISIT HI MDM: CPT | Mod: 25 | Performed by: STUDENT IN AN ORGANIZED HEALTH CARE EDUCATION/TRAINING PROGRAM

## 2025-02-10 PROCEDURE — A9585 GADOBUTROL INJECTION: HCPCS | Performed by: STUDENT IN AN ORGANIZED HEALTH CARE EDUCATION/TRAINING PROGRAM

## 2025-02-10 PROCEDURE — 99285 EMERGENCY DEPT VISIT HI MDM: CPT | Performed by: STUDENT IN AN ORGANIZED HEALTH CARE EDUCATION/TRAINING PROGRAM

## 2025-02-10 PROCEDURE — 250N000011 HC RX IP 250 OP 636: Performed by: EMERGENCY MEDICINE

## 2025-02-10 PROCEDURE — 72158 MRI LUMBAR SPINE W/O & W/DYE: CPT | Mod: 26 | Performed by: RADIOLOGY

## 2025-02-10 PROCEDURE — 84450 TRANSFERASE (AST) (SGOT): CPT | Performed by: STUDENT IN AN ORGANIZED HEALTH CARE EDUCATION/TRAINING PROGRAM

## 2025-02-10 PROCEDURE — 250N000011 HC RX IP 250 OP 636: Performed by: STUDENT IN AN ORGANIZED HEALTH CARE EDUCATION/TRAINING PROGRAM

## 2025-02-10 PROCEDURE — 85652 RBC SED RATE AUTOMATED: CPT | Performed by: STUDENT IN AN ORGANIZED HEALTH CARE EDUCATION/TRAINING PROGRAM

## 2025-02-10 PROCEDURE — 87086 URINE CULTURE/COLONY COUNT: CPT | Performed by: STUDENT IN AN ORGANIZED HEALTH CARE EDUCATION/TRAINING PROGRAM

## 2025-02-10 PROCEDURE — 96375 TX/PRO/DX INJ NEW DRUG ADDON: CPT | Performed by: STUDENT IN AN ORGANIZED HEALTH CARE EDUCATION/TRAINING PROGRAM

## 2025-02-10 PROCEDURE — 85025 COMPLETE CBC W/AUTO DIFF WBC: CPT | Performed by: STUDENT IN AN ORGANIZED HEALTH CARE EDUCATION/TRAINING PROGRAM

## 2025-02-10 PROCEDURE — 250N000013 HC RX MED GY IP 250 OP 250 PS 637: Performed by: STUDENT IN AN ORGANIZED HEALTH CARE EDUCATION/TRAINING PROGRAM

## 2025-02-10 PROCEDURE — 255N000002 HC RX 255 OP 636: Performed by: STUDENT IN AN ORGANIZED HEALTH CARE EDUCATION/TRAINING PROGRAM

## 2025-02-10 PROCEDURE — 72158 MRI LUMBAR SPINE W/O & W/DYE: CPT

## 2025-02-10 PROCEDURE — 36415 COLL VENOUS BLD VENIPUNCTURE: CPT | Performed by: STUDENT IN AN ORGANIZED HEALTH CARE EDUCATION/TRAINING PROGRAM

## 2025-02-10 PROCEDURE — 81001 URINALYSIS AUTO W/SCOPE: CPT | Performed by: STUDENT IN AN ORGANIZED HEALTH CARE EDUCATION/TRAINING PROGRAM

## 2025-02-10 PROCEDURE — 96374 THER/PROPH/DIAG INJ IV PUSH: CPT | Performed by: STUDENT IN AN ORGANIZED HEALTH CARE EDUCATION/TRAINING PROGRAM

## 2025-02-10 RX ORDER — HEPARIN SODIUM (PORCINE) LOCK FLUSH IV SOLN 100 UNIT/ML 100 UNIT/ML
100 SOLUTION INTRAVENOUS ONCE
Status: COMPLETED | OUTPATIENT
Start: 2025-02-10 | End: 2025-02-10

## 2025-02-10 RX ORDER — GADOBUTROL 604.72 MG/ML
8 INJECTION INTRAVENOUS ONCE
Status: COMPLETED | OUTPATIENT
Start: 2025-02-10 | End: 2025-02-10

## 2025-02-10 RX ORDER — CYCLOBENZAPRINE HCL 10 MG
10 TABLET ORAL 3 TIMES DAILY PRN
Qty: 20 TABLET | Refills: 0 | Status: SHIPPED | OUTPATIENT
Start: 2025-02-10 | End: 2025-02-16

## 2025-02-10 RX ORDER — ACETAMINOPHEN 325 MG/1
975 TABLET ORAL ONCE
Status: COMPLETED | OUTPATIENT
Start: 2025-02-10 | End: 2025-02-10

## 2025-02-10 RX ORDER — LIDOCAINE 50 MG/G
1 PATCH TOPICAL EVERY 24 HOURS
Qty: 10 PATCH | Refills: 0 | Status: SHIPPED | OUTPATIENT
Start: 2025-02-10 | End: 2025-02-10

## 2025-02-10 RX ORDER — OXYCODONE HYDROCHLORIDE 5 MG/1
5 TABLET ORAL EVERY 6 HOURS PRN
Qty: 12 TABLET | Refills: 0 | Status: SHIPPED | OUTPATIENT
Start: 2025-02-10 | End: 2025-02-10

## 2025-02-10 RX ORDER — CEPHALEXIN 500 MG/1
500 CAPSULE ORAL 2 TIMES DAILY
Qty: 6 CAPSULE | Refills: 0 | Status: SHIPPED | OUTPATIENT
Start: 2025-02-10 | End: 2025-02-13

## 2025-02-10 RX ORDER — PREDNISONE 20 MG/1
TABLET ORAL
Qty: 10 TABLET | Refills: 0 | Status: SHIPPED | OUTPATIENT
Start: 2025-02-10

## 2025-02-10 RX ORDER — MORPHINE SULFATE 4 MG/ML
4 INJECTION, SOLUTION INTRAMUSCULAR; INTRAVENOUS
Status: DISCONTINUED | OUTPATIENT
Start: 2025-02-10 | End: 2025-02-10 | Stop reason: HOSPADM

## 2025-02-10 RX ADMIN — GADOBUTROL 8 ML: 604.72 INJECTION INTRAVENOUS at 17:37

## 2025-02-10 RX ADMIN — ACETAMINOPHEN 975 MG: 325 TABLET, FILM COATED ORAL at 14:59

## 2025-02-10 RX ADMIN — HEPARIN 100 UNITS: 100 SYRINGE at 20:49

## 2025-02-10 RX ADMIN — MORPHINE SULFATE 4 MG: 4 INJECTION INTRAVENOUS at 15:00

## 2025-02-10 ASSESSMENT — ACTIVITIES OF DAILY LIVING (ADL)
ADLS_ACUITY_SCORE: 42

## 2025-02-10 ASSESSMENT — COLUMBIA-SUICIDE SEVERITY RATING SCALE - C-SSRS
2. HAVE YOU ACTUALLY HAD ANY THOUGHTS OF KILLING YOURSELF IN THE PAST MONTH?: NO
6. HAVE YOU EVER DONE ANYTHING, STARTED TO DO ANYTHING, OR PREPARED TO DO ANYTHING TO END YOUR LIFE?: NO
1. IN THE PAST MONTH, HAVE YOU WISHED YOU WERE DEAD OR WISHED YOU COULD GO TO SLEEP AND NOT WAKE UP?: NO

## 2025-02-10 NOTE — ED TRIAGE NOTES
Pt ambulatory from home with back pain that started over the weekend. Instructed by oncology team to come to ED. Hx of metastatic breast cancer.     Triage Assessment (Adult)       Row Name 02/10/25 1341          Triage Assessment    Airway WDL WDL        Respiratory WDL    Respiratory WDL WDL        Cardiac WDL    Cardiac WDL WDL        Peripheral/Neurovascular WDL    Peripheral Neurovascular WDL WDL        Cognitive/Neuro/Behavioral WDL    Cognitive/Neuro/Behavioral WDL WDL

## 2025-02-10 NOTE — ED PROVIDER NOTES
ED Provider Note  [facility]      History     Chief Complaint   Patient presents with    Back Pain     HPI  Hoda Brush is a 69 year old female with a history of breast cancer who presents to the ED with back pain. She states that on Friday she was sitting on the toilet and experienced shooting pain in center of her back that radiated up her spine. Symptoms are worse when sitting, somewhat relieved by standing and resolve when she is laying down. She has been using tylenol and pain patches with minimal relief. ~10 years ago she had mets to her left hip,and spine and stated that the symptoms felt similar. She reports no  associated bowel/bladder incontinence, no weakness.    Past Medical History  Past Medical History:   Diagnosis Date    Breast cancer metastasized to bone (H)     Lymphedema of left upper extremity      Past Surgical History:   Procedure Laterality Date    APPENDECTOMY      BIOPSY  2014    COLONOSCOPY N/A 2/1/2018    Procedure: COLONOSCOPY;  Colonoscopy;  Surgeon: Phillip Rowe MD;  Location: [facility]    ESOPHAGOSCOPY, GASTROSCOPY, DUODENOSCOPY (EGD), COMBINED N/A 2/16/2018    Procedure: COMBINED ESOPHAGOSCOPY, GASTROSCOPY, DUODENOSCOPY (EGD), BIOPSY SINGLE OR MULTIPLE;;  Surgeon: Paty Herman MD;  Location: [facility]    ESOPHAGOSCOPY, GASTROSCOPY, DUODENOSCOPY (EGD), COMBINED N/A 2/5/2020    Procedure: ESOPHAGOGASTRODUODENOSCOPY (EGD);  Surgeon: Anthony Alonso MD;  Location: [facility]    INSERT PORT VASCULAR ACCESS Right 9/15/2017    Procedure: INSERT PORT VASCULAR ACCESS;  Central venous chest port placement, right;  Surgeon: Dmitriy Hernandez PA-C;  Location: [facility]    LAPAROSCOPIC CHOLECYSTECTOMY N/A 8/31/2019    Procedure: LAPAROSCOPIC CHOLECYSTECTOMY;  Surgeon: Maldonado Billy MD;  Location: [facility]    MASTECTOMY Left 06/27/2014    and lymph node resection    MASTECTOMY SIMPLE BILATERAL      Mastectomy 06/27/2014     cephALEXin  (KEFLEX) 500 MG capsule  cyclobenzaprine (FLEXERIL) 10 MG tablet  predniSONE (DELTASONE) 20 MG tablet  acetaminophen (TYLENOL) 500 MG tablet  calcium carbonate (OS-TAYLER) 500 MG tablet  fluticasone (FLONASE) 50 MCG/ACT nasal spray  letrozole (FEMARA) 2.5 MG tablet  mometasone-formoterol (DULERA) 100-5 MCG/ACT inhaler  order for DME  pantoprazole (PROTONIX) 40 MG EC tablet  traZODone (DESYREL) 50 MG tablet  VITAMIN D, CHOLECALCIFEROL, PO      Allergies   Allergen Reactions    Zometa [Zoledronic Acid] Swelling     Family History  Family History   Problem Relation Age of Onset    Breast Cancer Mother 45    Lung Cancer Father         smoker    Breast Cancer Sister 61     Social History   Social History     Tobacco Use    Smoking status: Never     Passive exposure: Never    Smokeless tobacco: Never   Vaping Use    Vaping status: Never Used   Substance Use Topics    Alcohol use: No    Drug use: No      A medically appropriate review of systems was performed with pertinent positives and negatives noted in the HPI, and all other systems negative.    Physical Exam   BP: (!) 157/85  Pulse: 109  Temp: 97.5  F (36.4  C)  Resp: 18  Weight: 82.6 kg (182 lb)  SpO2: 95 %  Physical Exam  Exam:  Constitutional: healthy, alert, and no distress  Head: negative, Normocephalic. No masses, lesions, tenderness or abnormalities  Neck:   ENT:   Cardiovascular: negative, RRR. No murmurs,  Respiratory: negative,  Lungs clear  Gastrointestinal:   :   Musculoskeletal: extremities normal- no gross deformities noted, mild point tenderness over lumbar spine    Skin: no suspicious lesions or rashes  Neurologic: Gait normal but limited by pain. Sensation grossly WNL.  Psychiatric: mentation appears normal and affect normal/bright  Hematologic/Lymphatic/Immunologic:       ED Course, Procedures, & Data      Procedures          Results for orders placed or performed during the hospital encounter of 02/10/25   MR Lumbar Spine w/o & w Contrast     Status:  None    Narrative    EXAM: MR LUMBAR SPINE W/O & W CONTRAST  2/10/2025 5:53 PM     HISTORY:  severe acute onset midline back pain, known spinal  metastasis; Low back pain; r/o Cancer; No known/automatically detected  potential contraindications to imaging       COMPARISON:  CT CAP 1/6/2025    TECHNIQUE: Sagittal T1-weighted and T2-weighted and axial T2-weighted  images of the lumbar spine were obtained without intravenous contrast.  Post intravenous contrast using gadolinium axial and sagittal  T1-weighted images were obtained with fat saturation.    CONTRAST: gadobutrol (GADAVIST) injection 8 mL.    FINDINGS:  There are 5 lumbar type vertebrae, used for the purposes of this  dictation. Conus tip at L1. Normal lumbar vertebral alignment.  Heterogeneous marrow signal of the lumbar spine and visualized  portions of the sacrum/pelvis, compatible with known diffuse  metastatic disease. Most conspicuous lesions in the L1 and L3  vertebral bodies, as well as the right iliac bone. Mild disc space  narrowing with loss of the intrinsic T2 signal at T12-L1 and L1-L2.  Normal cauda equina.    On a level by level basis, the findings are as follows:    L1-2: Circumferential disc osteophyte complex. Mild bilateral  neuroforaminal stenosis. Mild spinal canal stenosis.    L2-3: Circumferential disc bulge. Bilateral facet arthropathy. Mild  bilateral neural foraminal stenosis. No spinal canal stenosis.    L3-4: Bilateral facet arthropathy. Mild left neural foraminal  stenosis. Tiny incidental left perineural cyst. Osseous and  periarticular edema of the left facet joint.    L4-5: Bilateral facet arthropathy, right greater than left, and  ligamentum flavum hypertrophy. Mild bilateral neuroforaminal stenosis.  No spinal canal stenosis. Osseous and periarticular edema of the right  facet joint.    L5-S1: Circumferential disc bulge with annular fissuring in the  central zone. Bilateral facet arthropathy, left greater than right.  No  spinal canal or neural foraminal stenosis.    The visualized paraspinous soft tissues are within normal limits.       Impression    IMPRESSION:  1. No acute fracture of the lumbar spine.  2. Multifocal metastases of the lumbar spine and visualized portions  of the sacrum/pelvis, similar to the CT 1/6/2025.  3. Multilevel lumbar spondylosis. No high-grade neuroforaminal or  spinal canal stenosis.  4. Active inflammatory arthropathy of the left L3-4 and right L4-5  facet joints.    I have personally reviewed the examination and initial interpretation  and I agree with the findings.    REBECCA OLMSTEAD MD         SYSTEM ID:  [id number]   Comprehensive metabolic panel     Status: Abnormal   Result Value Ref Range    Sodium 142 135 - 145 mmol/L    Potassium 4.2 3.4 - 5.3 mmol/L    Carbon Dioxide (CO2) 27 22 - 29 mmol/L    Anion Gap 12 7 - 15 mmol/L    Urea Nitrogen 15.6 8.0 - 23.0 mg/dL    Creatinine 0.94 0.51 - 0.95 mg/dL    GFR Estimate 66 >60 mL/min/1.73m2    Calcium 9.8 8.8 - 10.4 mg/dL    Chloride 103 98 - 107 mmol/L    Glucose 113 (H) 70 - 99 mg/dL    Alkaline Phosphatase 69 40 - 150 U/L    AST 44 0 - 45 U/L    ALT 52 (H) 0 - 50 U/L    Protein Total 8.3 6.4 - 8.3 g/dL    Albumin 4.3 3.5 - 5.2 g/dL    Bilirubin Total 0.3 <=1.2 mg/dL   Erythrocyte sedimentation rate auto     Status: Normal   Result Value Ref Range    Erythrocyte Sedimentation Rate 27 0 - 30 mm/hr   CRP inflammation     Status: Normal   Result Value Ref Range    CRP Inflammation 3.59 <5.00 mg/L   UA with Microscopic reflex to Culture     Status: Abnormal    Specimen: Urine, Midstream   Result Value Ref Range    Color Urine Yellow Colorless, Straw, Light Yellow, Yellow    Appearance Urine Clear Clear    Glucose Urine Negative Negative mg/dL    Bilirubin Urine Negative Negative    Ketones Urine Negative Negative mg/dL    Specific Gravity Urine 1.030 1.003 - 1.035    Blood Urine Negative Negative    pH Urine 5.5 5.0 - 7.0    Protein Albumin Urine 50 (A)  Negative mg/dL    Urobilinogen Urine Normal Normal, 2.0 mg/dL    Nitrite Urine Negative Negative    Leukocyte Esterase Urine Trace (A) Negative    Bacteria Urine Few (A) None Seen /HPF    Mucus Urine Present (A) None Seen /LPF    RBC Urine 7 (H) <=2 /HPF    WBC Urine 34 (H) <=5 /HPF    Hyaline Casts Urine 1 <=2 /LPF    Narrative    Urine Culture ordered based on laboratory criteria   CBC with platelets and differential     Status: None   Result Value Ref Range    WBC Count 8.0 4.0 - 11.0 10e3/uL    RBC Count 4.74 3.80 - 5.20 10e6/uL    Hemoglobin 14.0 11.7 - 15.7 g/dL    Hematocrit 43.9 35.0 - 47.0 %    MCV 93 78 - 100 fL    MCH 29.5 26.5 - 33.0 pg    MCHC 31.9 31.5 - 36.5 g/dL    RDW 13.4 10.0 - 15.0 %    Platelet Count 160 150 - 450 10e3/uL    % Neutrophils 54 %    % Lymphocytes 37 %    % Monocytes 6 %    % Eosinophils 3 %    % Basophils 1 %    % Immature Granulocytes 0 %    NRBCs per 100 WBC 0 <1 /100    Absolute Neutrophils 4.3 1.6 - 8.3 10e3/uL    Absolute Lymphocytes 2.9 0.8 - 5.3 10e3/uL    Absolute Monocytes 0.5 0.0 - 1.3 10e3/uL    Absolute Eosinophils 0.3 0.0 - 0.7 10e3/uL    Absolute Basophils 0.0 0.0 - 0.2 10e3/uL    Absolute Immature Granulocytes 0.0 <=0.4 10e3/uL    Absolute NRBCs 0.0 10e3/uL   Urine Culture     Status: None    Specimen: Urine, Midstream   Result Value Ref Range    Culture 10,000-50,000 CFU/mL Mixture of Urogenital Jeanette    CBC with platelets differential     Status: None    Narrative    The following orders were created for panel order CBC with platelets differential.  Procedure                               Abnormality         Status                     ---------                               -----------         ------                     CBC with platelets and d...[[id number]]                      Final result                 Please view results for these tests on the individual orders.     Medications   acetaminophen (TYLENOL) tablet 975 mg (975 mg Oral $Given 2/10/25 7895)    gadobutrol (GADAVIST) injection 8 mL (8 mLs Intravenous $Given 2/10/25 4649)   heparin lock flush 100 unit/mL injection 100 Units (100 Units Intravenous $Given 2/10/25 2049)     Labs Ordered and Resulted from Time of ED Arrival to Time of ED Departure   COMPREHENSIVE METABOLIC PANEL - Abnormal       Result Value    Sodium 142      Potassium 4.2      Carbon Dioxide (CO2) 27      Anion Gap 12      Urea Nitrogen 15.6      Creatinine 0.94      GFR Estimate 66      Calcium 9.8      Chloride 103      Glucose 113 (*)     Alkaline Phosphatase 69      AST 44      ALT 52 (*)     Protein Total 8.3      Albumin 4.3      Bilirubin Total 0.3     ROUTINE UA WITH MICROSCOPIC REFLEX TO CULTURE - Abnormal    Color Urine Yellow      Appearance Urine Clear      Glucose Urine Negative      Bilirubin Urine Negative      Ketones Urine Negative      Specific Gravity Urine 1.030      Blood Urine Negative      pH Urine 5.5      Protein Albumin Urine 50 (*)     Urobilinogen Urine Normal      Nitrite Urine Negative      Leukocyte Esterase Urine Trace (*)     Bacteria Urine Few (*)     Mucus Urine Present (*)     RBC Urine 7 (*)     WBC Urine 34 (*)     Hyaline Casts Urine 1     ERYTHROCYTE SEDIMENTATION RATE AUTO - Normal    Erythrocyte Sedimentation Rate 27     CRP INFLAMMATION - Normal    CRP Inflammation 3.59     CBC WITH PLATELETS AND DIFFERENTIAL    WBC Count 8.0      RBC Count 4.74      Hemoglobin 14.0      Hematocrit 43.9      MCV 93      MCH 29.5      MCHC 31.9      RDW 13.4      Platelet Count 160      % Neutrophils 54      % Lymphocytes 37      % Monocytes 6      % Eosinophils 3      % Basophils 1      % Immature Granulocytes 0      NRBCs per 100 WBC 0      Absolute Neutrophils 4.3      Absolute Lymphocytes 2.9      Absolute Monocytes 0.5      Absolute Eosinophils 0.3      Absolute Basophils 0.0      Absolute Immature Granulocytes 0.0      Absolute NRBCs 0.0       MR Lumbar Spine w/o & w Contrast   Final Result   IMPRESSION:   1.  No acute fracture of the lumbar spine.   2. Multifocal metastases of the lumbar spine and visualized portions   of the sacrum/pelvis, similar to the CT 1/6/2025.   3. Multilevel lumbar spondylosis. No high-grade neuroforaminal or   spinal canal stenosis.   4. Active inflammatory arthropathy of the left L3-4 and right L4-5   facet joints.      I have personally reviewed the examination and initial interpretation   and I agree with the findings.      REBECCA OLMSTEAD MD            SYSTEM ID:  [id number]             Critical care was not performed.     Medical Decision Making  The patient's presentation was of high complexity (a chronic illness severe exacerbation, progression, or side effect of treatment).    The patient's evaluation involved:  review of external note(s) from 3+ sources (see separate area of note for details)  review of 3+ test result(s) ordered prior to this encounter (see separate area of note for details)  ordering and/or review of 3+ test(s) in this encounter (see separate area of note for details)  discussion of management or test interpretation with another health professional (see separate area of note for details)    The patient's management necessitated high risk (a parenteral controlled substance).    Assessment & Plan    Hoda Brush is a 69 year old female with a history of breast cancer who presents to the ED with back pain. Differential at this time includes, spinal mets vs. pathological fracture vs. acute cord compression vs. epidural abscess vs. cauda equina syndrome. Pathological fracture most likely given acute onset, and previous history of mets to area. Lack of changes in b/b, no weakness, and lack of neurological symptoms is reassuring. Will get MRI to further assess.    Labs were drawn. CBC, CMP unremarkable, ESR and CRP WNL  MRI with no evidence of fracture/subluxation of lumbar spine, Mets look stable to CT on 1/6/25, active inflammatory arthropathy of L3-L5 facet joints.      -labs: CBC CMP, ESR, CRP   -UA  -MRI Lumbar Spine w & w/o contrast  -acetaminophen, 975 mg   -morphine, 4mg      I have reviewed the nursing notes. I have reviewed the findings, diagnosis, plan and need for follow up with the patient.    Discharge Medication List as of 2/10/2025  8:45 PM        START taking these medications    Details   cyclobenzaprine (FLEXERIL) 10 MG tablet Take 1 tablet (10 mg) by mouth 3 times daily as needed., Disp-20 tablet, R-0, E-Prescribe             Final diagnoses:   Midline low back pain, unspecified chronicity, unspecified whether sciatica present     Winter Mendez, MS4      --    ED Attending Physician Attestation    I Sondra Pimentel MD, cared for this patient with the Medical Student. I performed, or re-performed, the physical exam and medical decision-making. I have verified the accuracy of all the medical student findings and documentation above, and have edited as necessary.    69-year-old female with past medical history of known metastatic breast cancer including metastasis to the spine, currently in remission presenting to the emergency department due to acute onset severe back pain that radiated up her spine in the middle of her back, worse with standing, moving around and sitting, relieved by laying down somewhat.  Sent in by her oncologist due to her acute onset pain.  She has no numbness, weakness, no evidence on examination of focal neurological deficits, however after speaking with her oncologist, he does have concern about the possibility of fracture, including possible spinal cord stenosis causing some of her severe pain.  Neurologically intact, vitally stable.  Pending lab work including urinalysis, and MRI of her spine.  Signed out to the oncoming team    Sondra Pimentel MD  Emergency Medicine     [facility] EMERGENCY DEPARTMENT  2/10/2025     Sondra Pimentel MD  02/12/25 1969

## 2025-02-10 NOTE — TELEPHONE ENCOUNTER
Oncology Nurse Triage - Pain    Situation: Hoda reporting new lower spine pain with standing position via MyC message. Triage RN call to pt with [place]  to follow up.     Background:   Treating Provider:  Dr. Lisandro Aguiar    Date of last office visit: 1/7/25     Recent Treatments: 1/30/25 Cycle 126 Day 1 trastuzumab-qyyp.   Letrozole    Assessment:     Location:  lower spine  Onset: 2/8/25   Duration/Frequency: when laying down, there is no pain. She has a difficult time getting out of bed, has it when standing, sitting in chair, any movement she has pain.   Rates: 9/10  Quality: shooting  Current med(s) used: Tylenol 1g every 4 hours (stops around 11pm and starts again around 5am) and Lidocaine patches changing them q8h- takes off old one and puts a new one on (not letting skin breathe)  Worsens or relieves with: laying down helps  Has not tried ice or heat.     Denies numbness or tingling down legs, difficulties going to the bathroom, fevers/chills, cough, sore throat, chest pain, SOB, new or increased signs of bleeding.     Pharmacy of choice: [facility] in [place]     Recommendations:   Advised pt push Tylenol use out to 1g q6h with max dose of 4 doses/day. Writer informed will send message to Dr. Aguiar to review and contact her with recommendations. Pt voiced understanding.   1124 page to Dr. Aguiar.   1241 second page to Dr. Aguiar to middle line.  1242 return call from Dr. Aguiar who states she needs to go to [facility] ED to rule out cord compression, advises pt go within 1 hour.   1244 return call to pt with [place] , reviewed recommendations to come to [facility] ED within the hour to rule out cord compression. Verified address of [address] [place] [address]. She will look for transportation and head to ED.    1242 call to [facility] ED, provided warm hand off to Carina

## 2025-02-11 ENCOUNTER — PATIENT OUTREACH (OUTPATIENT)
Dept: GERIATRIC MEDICINE | Facility: CLINIC | Age: 70
End: 2025-02-11
Payer: COMMERCIAL

## 2025-02-11 NOTE — PROGRESS NOTES
[facility] Care Coordination Contact  CC received notification of Emergency Room visit.  ER visit occurred on 2/10/25 at [facility] with Dx of low back pain.    CC contacted member x2 and left a message requesting a return call.    Support Plan reviewed and updated.  PCP notified of ED visit via EMR.    Lakesha Ventura RN  [facility]  [phone]

## 2025-02-11 NOTE — DISCHARGE INSTRUCTIONS
TODAY'S VISIT:  You were seen today for back pain   -   - If you had any labs or imaging/radiology tests performed today, you should also discuss these tests with your usual provider.     FOLLOW-UP:  Please make an appointment to follow up with:  - Your Primary Care Provider. If you do not have a PCP, please call the Primary Care Center (phone: [phone] for an appointment    - Have your provider review the results from today's visit with you again to make sure no further follow-up or additional testing is needed based on those results.     RETURN TO THE EMERGENCY DEPARTMENT  Return to the Emergency Department at any time for any new or worsening symptoms or any concerns.

## 2025-02-11 NOTE — ED NOTES
Emergency Department Patient Sign-out       Brief HPI:  This is a 69 year old female signed out to me by Dr. Pimentel .  See initial ED Provider note for details of the presentation.           Significant Events prior to my assuming care: The patient is a 69-year-old female with a past medical history of breast cancer who presented to the emergency department with sudden onset of back pain.  Awaiting MRI for evaluation for pathologic fracture, low suspicion for neurologic involvement based on clinical presentation. WBC count wnl, patient afebrile. Urinalysis is also pending at this time.       Exam:   Patient Vitals for the past 24 hrs:   BP Temp Temp src Pulse Resp SpO2 Weight   02/10/25 1341 (!) 157/85 97.5  F (36.4  C) Oral 109 18 95 % 82.6 kg (182 lb)           ED RESULTS:   Results for orders placed or performed during the hospital encounter of 02/10/25 (from the past 24 hours)   CBC with platelets differential     Status: None    Collection Time: 02/10/25  2:51 PM    Narrative    The following orders were created for panel order CBC with platelets differential.  Procedure                               Abnormality         Status                     ---------                               -----------         ------                     CBC with platelets and d...[[id number]]                      Final result                 Please view results for these tests on the individual orders.   Comprehensive metabolic panel     Status: Abnormal    Collection Time: 02/10/25  2:51 PM   Result Value Ref Range    Sodium 142 135 - 145 mmol/L    Potassium 4.2 3.4 - 5.3 mmol/L    Carbon Dioxide (CO2) 27 22 - 29 mmol/L    Anion Gap 12 7 - 15 mmol/L    Urea Nitrogen 15.6 8.0 - 23.0 mg/dL    Creatinine 0.94 0.51 - 0.95 mg/dL    GFR Estimate 66 >60 mL/min/1.73m2    Calcium 9.8 8.8 - 10.4 mg/dL    Chloride 103 98 - 107 mmol/L    Glucose 113 (H) 70 - 99 mg/dL    Alkaline Phosphatase 69 40 - 150 U/L    AST 44 0 - 45 U/L    ALT 52 (H)  0 - 50 U/L    Protein Total 8.3 6.4 - 8.3 g/dL    Albumin 4.3 3.5 - 5.2 g/dL    Bilirubin Total 0.3 <=1.2 mg/dL   Erythrocyte sedimentation rate auto     Status: Normal    Collection Time: 02/10/25  2:51 PM   Result Value Ref Range    Erythrocyte Sedimentation Rate 27 0 - 30 mm/hr   CRP inflammation     Status: Normal    Collection Time: 02/10/25  2:51 PM   Result Value Ref Range    CRP Inflammation 3.59 <5.00 mg/L   CBC with platelets and differential     Status: None    Collection Time: 02/10/25  2:51 PM   Result Value Ref Range    WBC Count 8.0 4.0 - 11.0 10e3/uL    RBC Count 4.74 3.80 - 5.20 10e6/uL    Hemoglobin 14.0 11.7 - 15.7 g/dL    Hematocrit 43.9 35.0 - 47.0 %    MCV 93 78 - 100 fL    MCH 29.5 26.5 - 33.0 pg    MCHC 31.9 31.5 - 36.5 g/dL    RDW 13.4 10.0 - 15.0 %    Platelet Count 160 150 - 450 10e3/uL    % Neutrophils 54 %    % Lymphocytes 37 %    % Monocytes 6 %    % Eosinophils 3 %    % Basophils 1 %    % Immature Granulocytes 0 %    NRBCs per 100 WBC 0 <1 /100    Absolute Neutrophils 4.3 1.6 - 8.3 10e3/uL    Absolute Lymphocytes 2.9 0.8 - 5.3 10e3/uL    Absolute Monocytes 0.5 0.0 - 1.3 10e3/uL    Absolute Eosinophils 0.3 0.0 - 0.7 10e3/uL    Absolute Basophils 0.0 0.0 - 0.2 10e3/uL    Absolute Immature Granulocytes 0.0 <=0.4 10e3/uL    Absolute NRBCs 0.0 10e3/uL   MR Lumbar Spine w/o & w Contrast     Status: None    Collection Time: 02/10/25  5:53 PM    Narrative    EXAM: MR LUMBAR SPINE W/O & W CONTRAST  2/10/2025 5:53 PM     HISTORY:  severe acute onset midline back pain, known spinal  metastasis; Low back pain; r/o Cancer; No known/automatically detected  potential contraindications to imaging       COMPARISON:  CT CAP 1/6/2025    TECHNIQUE: Sagittal T1-weighted and T2-weighted and axial T2-weighted  images of the lumbar spine were obtained without intravenous contrast.  Post intravenous contrast using gadolinium axial and sagittal  T1-weighted images were obtained with fat saturation.    CONTRAST:  gadobutrol (GADAVIST) injection 8 mL.    FINDINGS:  There are 5 lumbar type vertebrae, used for the purposes of this  dictation. Conus tip at L1. Normal lumbar vertebral alignment.  Heterogeneous marrow signal of the lumbar spine and visualized  portions of the sacrum/pelvis, compatible with known diffuse  metastatic disease. Most conspicuous lesions in the L1 and L3  vertebral bodies, as well as the right iliac bone. Mild disc space  narrowing with loss of the intrinsic T2 signal at T12-L1 and L1-L2.  Normal cauda equina.    On a level by level basis, the findings are as follows:    L1-2: Circumferential disc osteophyte complex. Mild bilateral  neuroforaminal stenosis. Mild spinal canal stenosis.    L2-3: Circumferential disc bulge. Bilateral facet arthropathy. Mild  bilateral neural foraminal stenosis. No spinal canal stenosis.    L3-4: Bilateral facet arthropathy. Mild left neural foraminal  stenosis. Tiny incidental left perineural cyst. Osseous and  periarticular edema of the left facet joint.    L4-5: Bilateral facet arthropathy, right greater than left, and  ligamentum flavum hypertrophy. Mild bilateral neuroforaminal stenosis.  No spinal canal stenosis. Osseous and periarticular edema of the right  facet joint.    L5-S1: Circumferential disc bulge with annular fissuring in the  central zone. Bilateral facet arthropathy, left greater than right. No  spinal canal or neural foraminal stenosis.    The visualized paraspinous soft tissues are within normal limits.       Impression    IMPRESSION:  1. No acute fracture of the lumbar spine.  2. Multifocal metastases of the lumbar spine and visualized portions  of the sacrum/pelvis, similar to the CT 1/6/2025.  3. Multilevel lumbar spondylosis. No high-grade neuroforaminal or  spinal canal stenosis.  4. Active inflammatory arthropathy of the left L3-4 and right L4-5  facet joints.    I have personally reviewed the examination and initial interpretation  and I agree  with the findings.    REBECCA OLMSTEAD MD         SYSTEM ID:  [id number]       ED MEDICATIONS:   Medications   morphine (PF) injection 4 mg (4 mg Intravenous $Given 2/10/25 1500)   acetaminophen (TYLENOL) tablet 975 mg (975 mg Oral $Given 2/10/25 7009)   gadobutrol (GADAVIST) injection 8 mL (8 mLs Intravenous $Given 2/10/25 4539)         Impression:    ICD-10-CM    1. Midline low back pain, unspecified chronicity, unspecified whether sciatica present  M54.50           Plan:    Pending studies include MRI, urinalysis.    MRI shows known metastases, no acute fracture or subluxation.  There is inflammatory arthropathy of the left L3-L4 and right L4-L5.    Urinalysis was collected, but patient elected to leave the emergency department before this resulted.  She will follow-up with her primary team regarding the results.    Patient to be discharged home. Advised to follow up with her care team within 1 week. To return to ER immediately with any new/worsening symptoms. Plan of care discussed with patient who expresses understanding and agrees with plan of care.    Patient was offered prescription for oxycodone for additional pain control at home, which she adamantly declines as she is quite concerned about taking any additional opioids.  Will send prescription for Flexeril.  Patient may continue Tylenol and Lidoderm patches as well.  Patient states she has an adequate supply of both of these.  Patient states she is unable to take ibuprofen due to stomach issues with this in the past. Will send Rx for 5 day course of prednisone.     MD Raymond Bryant Michelle C, MD  02/10/25 2052

## 2025-02-11 NOTE — Clinical Note
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 Her oncologist Dr. Aguiar recommended she go in to get a scan due to her history. She has several oncology appointments coming up but no ED follow up yet. Let me know if you have questions.  Thank you,  Lakesha

## 2025-02-12 LAB — BACTERIA UR CULT: NORMAL

## 2025-02-20 ENCOUNTER — INFUSION THERAPY VISIT (OUTPATIENT)
Dept: ONCOLOGY | Facility: CLINIC | Age: 70
End: 2025-02-20
Attending: INTERNAL MEDICINE
Payer: COMMERCIAL

## 2025-02-20 ENCOUNTER — APPOINTMENT (OUTPATIENT)
Dept: LAB | Facility: CLINIC | Age: 70
End: 2025-02-20
Attending: INTERNAL MEDICINE
Payer: COMMERCIAL

## 2025-02-20 VITALS
TEMPERATURE: 98.8 F | OXYGEN SATURATION: 97 % | DIASTOLIC BLOOD PRESSURE: 77 MMHG | SYSTOLIC BLOOD PRESSURE: 130 MMHG | HEART RATE: 89 BPM | BODY MASS INDEX: 31.78 KG/M2 | WEIGHT: 180.5 LBS

## 2025-02-20 DIAGNOSIS — C50.912 MALIGNANT NEOPLASM OF LEFT FEMALE BREAST, UNSPECIFIED ESTROGEN RECEPTOR STATUS, UNSPECIFIED SITE OF BREAST (H): ICD-10-CM

## 2025-02-20 DIAGNOSIS — C79.51 MALIGNANT NEOPLASM METASTATIC TO BONE (H): Primary | ICD-10-CM

## 2025-02-20 LAB
ALBUMIN SERPL BCG-MCNC: 4 G/DL (ref 3.5–5.2)
ALP SERPL-CCNC: 63 U/L (ref 40–150)
ALT SERPL W P-5'-P-CCNC: 74 U/L (ref 0–50)
ANION GAP SERPL CALCULATED.3IONS-SCNC: 9 MMOL/L (ref 7–15)
AST SERPL W P-5'-P-CCNC: 78 U/L (ref 0–45)
BASOPHILS # BLD AUTO: 0 10E3/UL (ref 0–0.2)
BASOPHILS NFR BLD AUTO: 0 %
BILIRUB SERPL-MCNC: 0.5 MG/DL
BUN SERPL-MCNC: 14.5 MG/DL (ref 8–23)
CALCIUM SERPL-MCNC: 9.1 MG/DL (ref 8.8–10.4)
CEA SERPL-MCNC: 1.1 NG/ML
CHLORIDE SERPL-SCNC: 104 MMOL/L (ref 98–107)
CREAT SERPL-MCNC: 1 MG/DL (ref 0.51–0.95)
EGFRCR SERPLBLD CKD-EPI 2021: 61 ML/MIN/1.73M2
EOSINOPHIL # BLD AUTO: 0.3 10E3/UL (ref 0–0.7)
EOSINOPHIL NFR BLD AUTO: 3 %
ERYTHROCYTE [DISTWIDTH] IN BLOOD BY AUTOMATED COUNT: 13.7 % (ref 10–15)
GLUCOSE SERPL-MCNC: 86 MG/DL (ref 70–99)
HCO3 SERPL-SCNC: 29 MMOL/L (ref 22–29)
HCT VFR BLD AUTO: 41.6 % (ref 35–47)
HGB BLD-MCNC: 13.2 G/DL (ref 11.7–15.7)
IMM GRANULOCYTES # BLD: 0 10E3/UL
IMM GRANULOCYTES NFR BLD: 0 %
LYMPHOCYTES # BLD AUTO: 3.1 10E3/UL (ref 0.8–5.3)
LYMPHOCYTES NFR BLD AUTO: 35 %
MCH RBC QN AUTO: 29.5 PG (ref 26.5–33)
MCHC RBC AUTO-ENTMCNC: 31.7 G/DL (ref 31.5–36.5)
MCV RBC AUTO: 93 FL (ref 78–100)
MONOCYTES # BLD AUTO: 0.6 10E3/UL (ref 0–1.3)
MONOCYTES NFR BLD AUTO: 6 %
NEUTROPHILS # BLD AUTO: 4.8 10E3/UL (ref 1.6–8.3)
NEUTROPHILS NFR BLD AUTO: 55 %
NRBC # BLD AUTO: 0 10E3/UL
NRBC BLD AUTO-RTO: 0 /100
PLATELET # BLD AUTO: 234 10E3/UL (ref 150–450)
POTASSIUM SERPL-SCNC: 4.4 MMOL/L (ref 3.4–5.3)
PROT SERPL-MCNC: 7.5 G/DL (ref 6.4–8.3)
RBC # BLD AUTO: 4.47 10E6/UL (ref 3.8–5.2)
SODIUM SERPL-SCNC: 142 MMOL/L (ref 135–145)
WBC # BLD AUTO: 8.8 10E3/UL (ref 4–11)

## 2025-02-20 PROCEDURE — 82378 CARCINOEMBRYONIC ANTIGEN: CPT

## 2025-02-20 PROCEDURE — 250N000011 HC RX IP 250 OP 636: Performed by: INTERNAL MEDICINE

## 2025-02-20 PROCEDURE — 258N000003 HC RX IP 258 OP 636: Performed by: INTERNAL MEDICINE

## 2025-02-20 PROCEDURE — 85049 AUTOMATED PLATELET COUNT: CPT

## 2025-02-20 PROCEDURE — 80053 COMPREHEN METABOLIC PANEL: CPT

## 2025-02-20 PROCEDURE — 36591 DRAW BLOOD OFF VENOUS DEVICE: CPT

## 2025-02-20 RX ORDER — ALBUTEROL SULFATE 0.83 MG/ML
2.5 SOLUTION RESPIRATORY (INHALATION)
Status: CANCELLED | OUTPATIENT
Start: 2025-02-20

## 2025-02-20 RX ORDER — ALBUTEROL SULFATE 90 UG/1
1-2 INHALANT RESPIRATORY (INHALATION)
Start: 2025-03-13

## 2025-02-20 RX ORDER — HEPARIN SODIUM,PORCINE 10 UNIT/ML
5-20 VIAL (ML) INTRAVENOUS DAILY PRN
OUTPATIENT
Start: 2025-03-13

## 2025-02-20 RX ORDER — HEPARIN SODIUM (PORCINE) LOCK FLUSH IV SOLN 100 UNIT/ML 100 UNIT/ML
5 SOLUTION INTRAVENOUS
Status: DISCONTINUED | OUTPATIENT
Start: 2025-02-20 | End: 2025-02-20 | Stop reason: HOSPADM

## 2025-02-20 RX ORDER — ALBUTEROL SULFATE 0.83 MG/ML
2.5 SOLUTION RESPIRATORY (INHALATION)
OUTPATIENT
Start: 2025-04-03

## 2025-02-20 RX ORDER — LORAZEPAM 2 MG/ML
0.5 INJECTION INTRAMUSCULAR EVERY 4 HOURS PRN
OUTPATIENT
Start: 2025-03-13

## 2025-02-20 RX ORDER — HEPARIN SODIUM (PORCINE) LOCK FLUSH IV SOLN 100 UNIT/ML 100 UNIT/ML
5 SOLUTION INTRAVENOUS DAILY PRN
Status: DISCONTINUED | OUTPATIENT
Start: 2025-02-20 | End: 2025-02-20 | Stop reason: HOSPADM

## 2025-02-20 RX ORDER — METHYLPREDNISOLONE SODIUM SUCCINATE 40 MG/ML
40 INJECTION INTRAMUSCULAR; INTRAVENOUS
Start: 2025-03-13

## 2025-02-20 RX ORDER — DIPHENHYDRAMINE HCL 25 MG
50 CAPSULE ORAL
Start: 2025-04-03

## 2025-02-20 RX ORDER — EPINEPHRINE 1 MG/ML
0.3 INJECTION, SOLUTION, CONCENTRATE INTRAVENOUS EVERY 5 MIN PRN
Status: CANCELLED | OUTPATIENT
Start: 2025-02-20

## 2025-02-20 RX ORDER — EPINEPHRINE 1 MG/ML
0.3 INJECTION, SOLUTION INTRAMUSCULAR; SUBCUTANEOUS EVERY 5 MIN PRN
OUTPATIENT
Start: 2025-04-03

## 2025-02-20 RX ORDER — DIPHENHYDRAMINE HYDROCHLORIDE 50 MG/ML
25 INJECTION INTRAMUSCULAR; INTRAVENOUS
Start: 2025-03-13

## 2025-02-20 RX ORDER — METHYLPREDNISOLONE SODIUM SUCCINATE 40 MG/ML
40 INJECTION INTRAMUSCULAR; INTRAVENOUS
Start: 2025-04-03

## 2025-02-20 RX ORDER — MEPERIDINE HYDROCHLORIDE 25 MG/ML
25 INJECTION INTRAMUSCULAR; INTRAVENOUS; SUBCUTANEOUS
Status: CANCELLED | OUTPATIENT
Start: 2025-02-20

## 2025-02-20 RX ORDER — ALBUTEROL SULFATE 0.83 MG/ML
2.5 SOLUTION RESPIRATORY (INHALATION)
OUTPATIENT
Start: 2025-03-13

## 2025-02-20 RX ORDER — METHYLPREDNISOLONE SODIUM SUCCINATE 40 MG/ML
40 INJECTION INTRAMUSCULAR; INTRAVENOUS
Status: CANCELLED
Start: 2025-02-20

## 2025-02-20 RX ORDER — ALBUTEROL SULFATE 90 UG/1
1-2 INHALANT RESPIRATORY (INHALATION)
Start: 2025-04-03

## 2025-02-20 RX ORDER — DIPHENHYDRAMINE HYDROCHLORIDE 50 MG/ML
50 INJECTION INTRAMUSCULAR; INTRAVENOUS
Start: 2025-03-13

## 2025-02-20 RX ORDER — ALBUTEROL SULFATE 90 UG/1
1-2 INHALANT RESPIRATORY (INHALATION)
Status: CANCELLED
Start: 2025-02-20

## 2025-02-20 RX ORDER — DIPHENHYDRAMINE HCL 25 MG
50 CAPSULE ORAL
Start: 2025-03-13

## 2025-02-20 RX ORDER — HEPARIN SODIUM (PORCINE) LOCK FLUSH IV SOLN 100 UNIT/ML 100 UNIT/ML
5 SOLUTION INTRAVENOUS
OUTPATIENT
Start: 2025-04-03

## 2025-02-20 RX ORDER — ACETAMINOPHEN 325 MG/1
650 TABLET ORAL
Start: 2025-04-03

## 2025-02-20 RX ORDER — DIPHENHYDRAMINE HYDROCHLORIDE 50 MG/ML
50 INJECTION INTRAMUSCULAR; INTRAVENOUS
Status: CANCELLED
Start: 2025-02-20

## 2025-02-20 RX ORDER — MEPERIDINE HYDROCHLORIDE 25 MG/ML
25 INJECTION INTRAMUSCULAR; INTRAVENOUS; SUBCUTANEOUS
OUTPATIENT
Start: 2025-03-13

## 2025-02-20 RX ORDER — ACETAMINOPHEN 325 MG/1
650 TABLET ORAL
Status: CANCELLED
Start: 2025-02-20

## 2025-02-20 RX ORDER — MEPERIDINE HYDROCHLORIDE 25 MG/ML
25 INJECTION INTRAMUSCULAR; INTRAVENOUS; SUBCUTANEOUS
OUTPATIENT
Start: 2025-04-03

## 2025-02-20 RX ORDER — DIPHENHYDRAMINE HYDROCHLORIDE 50 MG/ML
50 INJECTION INTRAMUSCULAR; INTRAVENOUS
Start: 2025-04-03

## 2025-02-20 RX ORDER — HEPARIN SODIUM,PORCINE 10 UNIT/ML
5-20 VIAL (ML) INTRAVENOUS DAILY PRN
Status: CANCELLED | OUTPATIENT
Start: 2025-02-20

## 2025-02-20 RX ORDER — DIPHENHYDRAMINE HCL 25 MG
50 CAPSULE ORAL
Status: CANCELLED
Start: 2025-02-20

## 2025-02-20 RX ORDER — ACETAMINOPHEN 325 MG/1
650 TABLET ORAL
Start: 2025-03-13

## 2025-02-20 RX ORDER — LORAZEPAM 2 MG/ML
0.5 INJECTION INTRAMUSCULAR EVERY 4 HOURS PRN
OUTPATIENT
Start: 2025-04-03

## 2025-02-20 RX ORDER — EPINEPHRINE 1 MG/ML
0.3 INJECTION, SOLUTION, CONCENTRATE INTRAVENOUS EVERY 5 MIN PRN
OUTPATIENT
Start: 2025-03-13

## 2025-02-20 RX ORDER — HEPARIN SODIUM (PORCINE) LOCK FLUSH IV SOLN 100 UNIT/ML 100 UNIT/ML
5 SOLUTION INTRAVENOUS
OUTPATIENT
Start: 2025-03-13

## 2025-02-20 RX ORDER — LORAZEPAM 2 MG/ML
0.5 INJECTION INTRAMUSCULAR EVERY 4 HOURS PRN
Status: CANCELLED | OUTPATIENT
Start: 2025-02-20

## 2025-02-20 RX ORDER — DIPHENHYDRAMINE HYDROCHLORIDE 50 MG/ML
25 INJECTION INTRAMUSCULAR; INTRAVENOUS
Status: CANCELLED
Start: 2025-02-20

## 2025-02-20 RX ORDER — HEPARIN SODIUM,PORCINE 10 UNIT/ML
5-20 VIAL (ML) INTRAVENOUS DAILY PRN
OUTPATIENT
Start: 2025-04-03

## 2025-02-20 RX ORDER — DIPHENHYDRAMINE HYDROCHLORIDE 50 MG/ML
25 INJECTION INTRAMUSCULAR; INTRAVENOUS
Start: 2025-04-03

## 2025-02-20 RX ADMIN — HEPARIN 5 ML: 100 SYRINGE at 13:28

## 2025-02-20 RX ADMIN — SODIUM CHLORIDE 500 MG: 9 INJECTION, SOLUTION INTRAVENOUS at 12:58

## 2025-02-20 RX ADMIN — HEPARIN 5 ML: 100 SYRINGE at 11:33

## 2025-02-20 ASSESSMENT — PAIN SCALES - GENERAL: PAINLEVEL_OUTOF10: NO PAIN (0)

## 2025-02-20 NOTE — PATIENT INSTRUCTIONS
Contact Numbers    [facility] Main Line (for Scheduling/Triage/After Hours Nurse Line): [phone]    Please call the [facility] nurse triage or the after hours nurse line if you experience a temperature greater than or equal to 100.4, shaking chills, have uncontrolled nausea, vomiting and/or diarrhea, dizziness, lightheadedness, shortness of breath, chest pain, bleeding, unexplained bruising, or if you have any other new/concerning symptoms, questions or concerns.     If you are having any concerning symptoms or wish to speak to a provider before your next infusion visit, please call your care coordinator or triage to notify them so we can adequately serve you.     If you need any refills on medications (narcotics or other medications), please call before your infusion appointment.      Lab Results:  Recent Results (from the past 12 hours)   COMPREHENSIVE METABOLIC PANEL    Collection Time: 02/20/25 11:38 AM   Result Value Ref Range    Sodium 142 135 - 145 mmol/L    Potassium 4.4 3.4 - 5.3 mmol/L    Carbon Dioxide (CO2) 29 22 - 29 mmol/L    Anion Gap 9 7 - 15 mmol/L    Urea Nitrogen 14.5 8.0 - 23.0 mg/dL    Creatinine 1.00 (H) 0.51 - 0.95 mg/dL    GFR Estimate 61 >60 mL/min/1.73m2    Calcium 9.1 8.8 - 10.4 mg/dL    Chloride 104 98 - 107 mmol/L    Glucose 86 70 - 99 mg/dL    Alkaline Phosphatase 63 40 - 150 U/L    AST 78 (H) 0 - 45 U/L    ALT 74 (H) 0 - 50 U/L    Protein Total 7.5 6.4 - 8.3 g/dL    Albumin 4.0 3.5 - 5.2 g/dL    Bilirubin Total 0.5 <=1.2 mg/dL   CBC with platelets and differential    Collection Time: 02/20/25 11:38 AM   Result Value Ref Range    WBC Count 8.8 4.0 - 11.0 10e3/uL    RBC Count 4.47 3.80 - 5.20 10e6/uL    Hemoglobin 13.2 11.7 - 15.7 g/dL    Hematocrit 41.6 35.0 - 47.0 %    MCV 93 78 - 100 fL    MCH 29.5 26.5 - 33.0 pg    MCHC 31.7 31.5 - 36.5 g/dL    RDW 13.7 10.0 - 15.0 %    Platelet Count 234 150 - 450 10e3/uL    % Neutrophils 55 %    % Lymphocytes 35 %    % Monocytes 6 %    % Eosinophils 3 %     % Basophils 0 %    % Immature Granulocytes 0 %    NRBCs per 100 WBC 0 <1 /100    Absolute Neutrophils 4.8 1.6 - 8.3 10e3/uL    Absolute Lymphocytes 3.1 0.8 - 5.3 10e3/uL    Absolute Monocytes 0.6 0.0 - 1.3 10e3/uL    Absolute Eosinophils 0.3 0.0 - 0.7 10e3/uL    Absolute Basophils 0.0 0.0 - 0.2 10e3/uL    Absolute Immature Granulocytes 0.0 <=0.4 10e3/uL    Absolute NRBCs 0.0 10e3/uL

## 2025-02-20 NOTE — PROGRESS NOTES
Infusion Nursing Note:  Hoda Brush presents today for C2D1 Trastuzumab-qyyp.    Patient seen by provider today: No   present during visit today: Not Applicable.    Note: Patient presents to Infusion Clinic feeling well today. Denies fever, chills, pain, or any other symptoms of infection/illness. Patient wishes to proceed with treatment today.      Intravenous Access:  Implanted Port.    Treatment Conditions:  Lab Results   Component Value Date    HGB 13.2 02/20/2025    WBC 8.8 02/20/2025    ANEU 3.1 06/22/2021    ANEUTAUTO 4.8 02/20/2025     02/20/2025        Lab Results   Component Value Date     02/20/2025    POTASSIUM 4.4 02/20/2025    CR 1.00 (H) 02/20/2025    TAYLER 9.1 02/20/2025    BILITOTAL 0.5 02/20/2025    ALBUMIN 4.0 02/20/2025    ALT 74 (H) 02/20/2025    AST 78 (H) 02/20/2025       Results reviewed, labs MET treatment parameters, ok to proceed with treatment.  ECHO last completed on 12/12/2024 EF 55-60%.    Post Infusion Assessment:  Patient tolerated infusion without incident.  Blood return noted pre and post infusion.  Site patent and intact, free from redness, edema or discomfort.  No evidence of extravasations.  Access discontinued per protocol.       Discharge Plan:   Patient declined prescription refills.  Discharge instructions reviewed with: Patient.  Patient and/or family verbalized understanding of discharge instructions and all questions answered.  AVS to patient via [facility].  Patient will return 3/13 for next appointment.   Patient discharged in stable condition accompanied by: self.  Departure Mode: Ambulatory.      Yocasta Darnell RN

## 2025-02-23 NOTE — PROGRESS NOTES
ONCOLOGY NOTE     Hoda Brush  Female, 69 year old, 1955  MRN: [id number]        Hoda is a 69 year old patient from [facility] and is seen here for continuation of care for her metastatic ER+HER2- breast cancer.  She is a refugee from [facility] and was initially seen in clinic with her daughter.  The only data we have from [facility] is an English translation of her records.       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  On 02/11/2014, she was seen by an oncologist.  The clinical staging of T4b N2 M1 was noted.   Her breast cancer was on the left side. There was no right breast cancer, confirmed by the patient and her daughter, correcting a possible error in the translated records.  ER was positive in 100% of the cells, SD at 14% and HER2 was 3+ positive.  It appears that this histopathologic information is on the mastectomy specimen. She underwent an MRI for staging of presumptive bone metastases which was performed 03/02/2014.  There were skeletal metastases in the thoracic vertebrae at 12, L1, L3, L5 and S1 vertebral body, ranging in size from 0.7-3.0 cm in size.  Ischial and right femur were also involved, as well as a large iliac mass measuring about 15 cm in the uterus. 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   Radiation Oncology consultation was performed 03/11/2014, and she was given radiation in 1 dose of 6 Gy to the right iliac lesion.        With the initial diagnosis, she initiated treatment with 6 cycles of CAF neoadjuvant chemotherapy 02/14, 07/07, 03/28, 04/18/14, 05/08 and 05/29/14. She also received monthly zoledronic acid.  She then underwent a modified left mastectomy of Palacios type and left lymphadenoectomy on 06/27/2014.   Pathologic examination showed number at [facility] was [id number] showed infiltrative grade 2 ductal cancer with 6 level 1 metastatic lympFollow up with Jossie for visits  and trastuzumab on 2-5, 2-26, 3-19 with CBC, CMP.  Echocardiogram on 3-19.  Follow up with me 4-9 with CBC, CMP, CA27.29 and CEA and with CT CAP on 4-8.h nodes and 3 level 2 metastatic lymph nodes.  The differentiation was intermediate.  The tumor was ER positive 70% of the cells, MO positive in 40% of the cells and HER2 was 3+ by immunohistochemistry and the Ki-67 labeling index was 20%. Her staging after surgery was stage IV, pT4b N2 M1.  I don't see a biopsy of the skeletal metastases.  She then had continuation with chemotherapy with 4 cycles of Herceptin and taxane with monthly zoledronic acid.  Tamoxifen was initiated.  She then had two years of Herceptin and a decision was made not to continue further Herceptin.  She continued on zoledronic acid every 3 months. She was clinically stable. [place] then [place] the [place] new [place] from [facility].  She has now been changed to letrozole.  Denosumab has now been held for new diagnosis of osteonecrosis of the R maxilla, resumed 2/2025 after a clear from a dentist.     History of cholecystectomy 2020.      TREATMENT HISTORY:  A. Initial diagnosis with metastatic breast cancer in [facility].  Neoadjuvant CAF x 6.   B. Left mastectomy. Left axillary node dissection.  C.  Radiation in 1 dose to R iliac region.  C. Herceptin for 2 years taxane for a prescribed course then stopped, monthly zoledronic acid.  She had 2 years of Herceptin with tamoxifen added after chemotherapy.  D.  Tamoxifen alone and zoledronic acid every 3 months starting 2014.  Letrozole was started   E.  [place]  We restarted Herceptin every 3 weeks and continued tamoxifen. Bone targeted agent changed to denosumab every 9 weeks. Zometa discontinued 2017.  Tamoxifen was changed to letrozole August 2019.    F.  Xgeva discontinued 12-17-19 because of dental issues.   G.  J+J vaccine March, 2021.  H.  Was on Zometa once May 11.  Reaction with eye lid swelling. Discontinued Zometa.  I.  Restart  Xgeva 6-22-21.  Continuing the trastuzumab and letrozole.  Both tolerated well.   J.  Discontinue Xgeva because of osteonecrosis of the maxilla.   Continue trastuzumab and letrozole.   K. Acute back pain. MR L spine 2/10/2025 with stable bone metastases without cord compression and negative PET in bone lesions. L1 with periosteal involvement. Continue trantuzumab/letrozole. Referral to rad onc, neurosurgery, and palliative. Sent liquid biopsy. Clear by a dentist, resumed Xgeva 2/27/2025.      INTERVAL HISTORY  She continues on trastuzumab, letrozole.    Had a very bad back pain acutely 2 weeks ago and went to the ED. MRI 2/10/2025 with stable bone mets without cord compression. The pain is mainly in the middle of the back, not radiating, sharp and intermittent. No bowel/bladder involvement. No weakness or numbness. She now could not sit or stand for long and needs to change the posture. She only uses tylenol and doesn't want to escalate her regimen.  She rates the back pain a 5 out of 10.  The pain is controlled with Tylenol and she does not want an opiate pain medicines.  We will obtain consultation with radiation oncology.    Walks on a treadmill 20 mins/day. Limited activities due to back pain. Denies other side effect. Good appetite. No respiratory symptoms.     No current teeth issues. Was cleared by a dentist.     REVIEW OF SYSTEMS:  A 10-point review of systems is entirely negative.     She is a eating a Mediterranean-style diet.  She is exercising by swimming.  I did advise adding a weightbearing exercise.  She takes vitamin D3 2000 International Units per day and calcium.  Her last DEXA scan performed a week ago shows a most valid negative T-score of -2.5 in the left hip, consistent with osteoporosis.     PHYSICAL EXAMINATION:    VITALS: /76 (BP Location: Right arm, Patient Position: Sitting, Cuff Size: Adult Regular)   Pulse 91   Temp 98.2  F (36.8  C) (Oral)   Resp 12   Wt 81.6 kg (179 lb 14.4  oz)   SpO2 94%   BMI 31.68 kg/m    GENERAL:  Hoda appeared generally well and independent  HEENT:  She has no alopecia.  Examination of oropharynx reveals good dentition.  LYMPH:  There is no cervical, supraclavicular, subclavicular or axillary lymphadenopathy.  BREASTS:  Deferred  LUNGS:  Clear to percussion and auscultation.  CARDIAC:  Heart has a regular rate and rhythm, S1, S2.  ABDOMEN:  Soft, nontender, without hepatosplenomegaly.  EXTREMITIES:  Without edema.  PSYCH:  Mood and affect were normal.     LABORATORY DATA:    BMP stable with Cr 1.0. Mildly elevated AST and ALT (78/74) from (44/52)  CBC wnl.  CEA 1.1 (stable)     1. PET of the neck, chest, abdomen, and pelvis.  2. PET CT Fusion for Attenuation Correction and Anatomical  Localization:    3. 3D MIP and PET-CT fused images were processed on an independent  workstation and archived to PACS and reviewed by a radiologist.    PET February 25, 2025     Technique:     1. PET: The patient received 10.78 mCi of F-18-FDG; the serum glucose  was 104 mg/dL prior to administration, body weight was 81.9 kg. Images  were evaluated in the axial, sagittal, and coronal planes as well as  the rotational whole body MIP. Images were acquired from the Vertex to  the Feet.     UPTAKE WAS MEASURED AT 60 MINUTES.      2. CT: CT only obtained for attenuation correction and not diagnostic  purposes.     INDICATION: Carcinoma of upper-outer quadrant of left breast in  female, estrogen receptor positive (H); Carcinoma of upper-outer  quadrant of left breast in female, estrogen receptor positive (H)     ADDITIONAL INFORMATION OBTAINED FROM EMR: 70-year-old female diagnosed  in 2014 with a left breast cancer. Clinical staging of T4b N2 M1. ER  was positive in 100% of the cells, DE at 14% and HER2 was 3+ positive.   There were skeletal metastases in the thoracic vertebrae at 12, L1,  L3, L5 and S1 vertebral body. Ischial and right femur were also  involved, as well as a large  iliac mass. Status post radiation in 1  dose of 6 Gy to the right iliac lesion. Patient currently on letrozole  and trastuzuamb.  Denosumab has now been held for new diagnosis of  osteonecrosis of the R maxilla.     COMPARISON: CT 1/6/2025, PET CT 12/21/2017     FINDINGS:   BACKGROUND: Liver SUV max = 3.40, Aorta Blood SUV max = 3.38.      HEAD/NECK:  Few mildly avid nonenlarged nodes are similar to prior:   Right level 2 lymph node measures 7 x 12 mm (4/71) with a maximum SUV  of 3.70. Right parotid lymph node measuring 10 x 5 mm (4/75) with a  max SUV of 2.86.  Similar-appearing segmental sclerosis in the right hemimandible  medullary cavity.     CHEST:  No abnormal uptake. Postprocedural changes of left mastectomy.  Slightly decreased size of 5mm inferior anterior right middle lobe  (4/154) nodule, and unchanged 5 mm nodule at the left lung base  (4/154). These nodules do not demonstrate increased uptake (they are  below PET threshold) .     ABDOMEN AND PELVIS:  No abnormal uptake. Myomatous uterus. Hepatic steatosis.  Cholecystectomy.     LOWER EXTREMITIES:   No abnormal uptake.      BONES AND SOFT TISSUES:   No abnormal uptake. There are innumerable sclerotic lesions present  throughout the ribs, vertebral bodies, right femur, and pelvis. These  demonstrate heterogenous appearing uptake which is now below  background. Resolution of previously seen residual uptake by the right  iliac bone particularly acetabulum. iliac bone. No suspicious uptake.                                                                      IMPRESSION:   In this patient with history of metastatic left breast cancer:  1. No metabolically active disease. Stable appearance of the sclerotic  lesions throughout the axial and appendicular skeleton.  2. Stable couple of 5 mm pulmonary nodules.  3. Mildly avid nonenlarged right cervical lymph nodes are likely  reactive.     I have personally reviewed the examination and initial  interpretation  and I agree with the findings.     DARLING ANNA MD     EXAM: MR LUMBAR SPINE W/O & W CONTRAST  2/10/2025 5:53 PM      HISTORY:  severe acute onset midline back pain, known spinal  metastasis; Low back pain; r/o Cancer; No known/automatically detected  potential contraindications to imaging        COMPARISON:  CT CAP 1/6/2025     TECHNIQUE: Sagittal T1-weighted and T2-weighted and axial T2-weighted  images of the lumbar spine were obtained without intravenous contrast.  Post intravenous contrast using gadolinium axial and sagittal  T1-weighted images were obtained with fat saturation.     CONTRAST: gadobutrol (GADAVIST) injection 8 mL.     FINDINGS:  There are 5 lumbar type vertebrae, used for the purposes of this  dictation. Conus tip at L1. Normal lumbar vertebral alignment.  Heterogeneous marrow signal of the lumbar spine and visualized  portions of the sacrum/pelvis, compatible with known diffuse  metastatic disease. Most conspicuous lesions in the L1 and L3  vertebral bodies, as well as the right iliac bone. Mild disc space  narrowing with loss of the intrinsic T2 signal at T12-L1 and L1-L2.  Normal cauda equina.     On a level by level basis, the findings are as follows:     L1-2: Circumferential disc osteophyte complex. Mild bilateral  neuroforaminal stenosis. Mild spinal canal stenosis.     L2-3: Circumferential disc bulge. Bilateral facet arthropathy. Mild  bilateral neural foraminal stenosis. No spinal canal stenosis.     L3-4: Bilateral facet arthropathy. Mild left neural foraminal  stenosis. Tiny incidental left perineural cyst. Osseous and  periarticular edema of the left facet joint.     L4-5: Bilateral facet arthropathy, right greater than left, and  ligamentum flavum hypertrophy. Mild bilateral neuroforaminal stenosis.  No spinal canal stenosis. Osseous and periarticular edema of the right  facet joint.     L5-S1: Circumferential disc bulge with annular fissuring in the  central zone.  Bilateral facet arthropathy, left greater than right. No  spinal canal or neural foraminal stenosis.     The visualized paraspinous soft tissues are within normal limits.                                                                       IMPRESSION:  1. No acute fracture of the lumbar spine.  2. Multifocal metastases of the lumbar spine and visualized portions  of the sacrum/pelvis, similar to the CT 1/6/2025.  3. Multilevel lumbar spondylosis. No high-grade neuroforaminal or  spinal canal stenosis.  4. Active inflammatory arthropathy of the left L3-4 and right L4-5  facet joints.     I have personally reviewed the examination and initial interpretation  and I agree with the findings.     REBECCA OLMSTEAD MD         ASSESSMENT AND PLAN:       Hoda Brush is a 69-year-old woman with a history of ER-positive, AR-positive, HER2 positive breast cancer.  She is from [facility] and came to live in the [place] with her daughter.  The tumor is ER positive, AR positive and HER2 positive.  She had metastatic disease at the time of presentation with bone-only metastases by report.  She underwent neoadjuvant CAF, had a left mastectomy, left axillary lymph node dissection, radiation to the right hip, which included the right iliac region where she had metastatic disease.  She was initially on tamoxifen but then was switched to letrozole.  She continues on this and every 3-week trastuzumab.  She has had no evidence of disease progression for the last 7 years and possible longer but we don't have detailed data from [facility].  Markers are low and stable.  We have continued Herceptin every 3 weeks as well as daily letrozole. CT CAP shows stable pulmonary nodules.   Hoda Brush continues to do well on a combination of trastuzumab and letrozole. She has no evidence of disease progression.   Her ejection fraction remains stable.   She continues to do well on trastuzumab and letrozole.   She will continue with  every 4-month CT of the chest, abdomen and pelvis.    New back pain without radiculopathy. MRI L spine/PET 2/2025 with stable bone lesions, no FDG avidity, L1 is likely a culprit lesion per clinical. Referral to radiation oncology (role of palliative radiation), neurosurgery (supportive mx), and palliative (pain control). Plan to repeat MRI L spine in 4 months.  On reviewing the MRI of the lumbar spine I do think that there is an area of penetration of the bone cortex and posterior aspect of the L1 vertebral body and we will refer her to radiation oncology.  Although this lesion is not metabolically active it is possible that penetration of the periosteum may be resulting in pain and she may benefit from a single fraction of radiation or what ever would be recommended by radiation oncology.  The right maxillary osteonecrosis has healed and she is able to continue with Xgeva every 3 months, resumed on 2/27/2025.    Echo. Stable EF.  60-65%.  Pain control.  She wants to continue with Tylenol.  She does not want to get a medicine at this time.  Caris assure testing will be sent.  We are looking for possible ESR1 mutation.  We will continue with letrozole for now.  Follow up.  Radiation oncology consultation this coming week for lumbar pain.  Neurosurgery consultation.  Known metastases.  Continue Herceptin through the end of 2025 alternating providers every 9 weeks. CT CAP will be moved up to every 2 months.  Follow up with Kassidy March 13 with CBC, CMP, CA27.29 and CEA.  Xgeva can be restarted because dental work is completed.  Please send Caris assure test Dexa  July 25 2025.  CBC, CMP every 3 weeks and CA27.29 and CEA every 6 weeks. Echo every 3 months, next on March 12.  CT CAP every 4 months, next May 14 with visit with me on May 15 with trastuzumab cycle 131.        Thank you for allowing us to participate in this patient's care.       Sincerely,      Lisandro Aguiar MD  Professor  [facility]  [place]  [phone]           I spent 35 minutes with the patient more than 50% of which was in counseling and coordination of care.

## 2025-02-24 ENCOUNTER — VIRTUAL VISIT (OUTPATIENT)
Dept: PULMONOLOGY | Facility: CLINIC | Age: 70
End: 2025-02-24
Attending: NURSE PRACTITIONER
Payer: COMMERCIAL

## 2025-02-24 DIAGNOSIS — J45.20 MILD INTERMITTENT REACTIVE AIRWAY DISEASE: ICD-10-CM

## 2025-02-24 DIAGNOSIS — R05.3 CHRONIC COUGH: Primary | ICD-10-CM

## 2025-02-24 PROCEDURE — 98006 SYNCH AUDIO-VIDEO EST MOD 30: CPT | Performed by: NURSE PRACTITIONER

## 2025-02-24 NOTE — PROGRESS NOTES
Pulmonary Clinic Follow-up     Video start time: 2:52pm  Video end time: 2:57pm  Francesca  Patient at home, provider at clinic.      Assessment/Plan:     70 year old female with a history of metastatic L breast cancer s/p L mastectomy-chemo-radiation, presenting for one month follow-up of chronic cough.      Chronic cough  Pulmonary nodules  GERD  Lifelong non-smoker presented 1/2025 for evaluation of cough for the past 1.5 months, no preceding event.  She also endorses some difficulty taking a deep breath.  She reported cough when falling asleep, to her oncologist.  Her cough was thought due to GERD, started on protonix, but also referred to pulmonology.  She has not noticed a difference with the protonix.  EGD in 2020 with hiatal hernia and gastritis.   Eosinophils 0.3 on 1/7/25.  Chest CT 1/6/25 with stable 6mm nodules in RML and 5mm in LLL, otherwise normal lung parenchyma and airways.  These nodules have been noted to be stable since 2020.  There is a small hiatal hernia.   Pulmonary function testing with normal spirometry, slight air-trapping, and normal diffusion capacity.  It's possible that she has some mild intermittent reactive airways, due to mild air-trapping, although keep in mind that she has hiatal hernia and possible GERD that could be causing cough as well.   Last visit we started Dulera.  She states she used this for only 10 days and her cough resolved.    Plan:  - continue Dulera (mometasone/formoterol) 100/5, two inhalations BID as needed, rinse/gargle after use.  - continue protonix per oncology.   - if no benefit from inhaler, most likely her cough is due to GERD - could refer to GI, or perform esophagram in the meantime.     Follow-up:  - 3 months virtually    Gwen Kim CNP  Pulmonary Medicine  [facility]  [phone]       CC:     Cough follow-up     HPI:     70 year old female with a history of metastatic L breast cancer s/p L  mastectomy-chemo-radiation, presenting for one month follow-up of chronic cough.      Cough has resolved.  Used dulera x10 days then stopped.  Rinsed mouth afterwards.  No SEs, no high cost.       Previous HPI:  Cough for 1.5 months.  No illness prior.  No medication changes.   Never had this happen before.   Difficult to take deep breaths.  When she does take deep breaths, start coughing.  When laying flat, wheezing.  Also when laying on sides.  No SOB.  Shallow, little breaths.   No concerns with breathing when younger.  No frequent illness.  No lingering symptoms.  Hx of bronchitis, but only once.   Protonix x1 month, hasn't noticed much difference.  No real heartburn issues.   No sinus drainage currently, but in past - spring, sometimes summer.   Some concern for allergies.  Sneezing and sinus drainage.  Takes claritin, does help.            ROS:     A 4-system review was obtained and was negative with the exception of the symptoms endorsed in the HPI.       Medical history:       PMH:  Past Medical History:   Diagnosis Date    Breast cancer metastasized to bone (H)     Lymphedema of left upper extremity        PSH:  Past Surgical History:   Procedure Laterality Date    APPENDECTOMY      BIOPSY  2014    COLONOSCOPY N/A 2/1/2018    Procedure: COLONOSCOPY;  Colonoscopy;  Surgeon: Phillip Rowe MD;  Location:  GI    ESOPHAGOSCOPY, GASTROSCOPY, DUODENOSCOPY (EGD), COMBINED N/A 2/16/2018    Procedure: COMBINED ESOPHAGOSCOPY, GASTROSCOPY, DUODENOSCOPY (EGD), BIOPSY SINGLE OR MULTIPLE;;  Surgeon: Paty Herman MD;  Location:  GI    ESOPHAGOSCOPY, GASTROSCOPY, DUODENOSCOPY (EGD), COMBINED N/A 2/5/2020    Procedure: ESOPHAGOGASTRODUODENOSCOPY (EGD);  Surgeon: Anthony Alonso MD;  Location:  GI    INSERT PORT VASCULAR ACCESS Right 9/15/2017    Procedure: INSERT PORT VASCULAR ACCESS;  Central venous chest port placement, right;  Surgeon: Dmitriy Hernandez PA-C;  Location:  OR     LAPAROSCOPIC CHOLECYSTECTOMY N/A 8/31/2019    Procedure: LAPAROSCOPIC CHOLECYSTECTOMY;  Surgeon: Maldonado Billy MD;  Location: [facility] OR    MASTECTOMY Left 06/27/2014    and lymph node resection    MASTECTOMY SIMPLE BILATERAL      Mastectomy 06/27/2014       Allergies:  Allergies   Allergen Reactions    Zometa [Zoledronic Acid] Swelling       Family Hx:  Family History   Problem Relation Age of Onset    Breast Cancer Mother 45    Lung Cancer Father         smoker    Breast Cancer Sister 61       Social Hx:  Social History     Socioeconomic History    Marital status:      Spouse name: Not on file    Number of children: Not on file    Years of education: Not on file    Highest education level: Not on file   Occupational History    Not on file   Tobacco Use    Smoking status: Never     Passive exposure: Never    Smokeless tobacco: Never   Vaping Use    Vaping status: Never Used   Substance and Sexual Activity    Alcohol use: No    Drug use: No    Sexual activity: Not Currently     Partners: Male   Other Topics Concern    Parent/sibling w/ CABG, MI or angioplasty before 65F 55M? No   Social History Narrative    Not on file     Social Drivers of Health     Financial Resource Strain: Low Risk  (5/13/2024)    Financial Resource Strain     Within the past 12 months, have you or your family members you live with been unable to get utilities (heat, electricity) when it was really needed?: No   Food Insecurity: Low Risk  (5/13/2024)    Food Insecurity     Within the past 12 months, did you worry that your food would run out before you got money to buy more?: No     Within the past 12 months, did the food you bought just not last and you didn t have money to get more?: No   Transportation Needs: Low Risk  (5/13/2024)    Transportation Needs     Within the past 12 months, has lack of transportation kept you from medical appointments, getting your medicines, non-medical meetings or appointments, work, or from getting  things that you need?: No   Physical Activity: Unknown (5/13/2024)    Exercise Vital Sign     Days of Exercise per Week: Patient declined     Minutes of Exercise per Session: 30 min   Stress: No Stress Concern Present (5/13/2024)    [employer] [place] [employer] - Occupational Stress Questionnaire     Feeling of Stress : Not at all   Social Connections: Unknown (5/13/2024)    Social Connection and Isolation Panel [NHANES]     Frequency of Communication with Friends and Family: Not on file     Frequency of Social Gatherings with Friends and Family: Twice a week     Attends Taoism Services: Not on file     Active Member of Clubs or Organizations: Not on file     Attends Club or Organization Meetings: Not on file     Marital Status: Not on file   Interpersonal Safety: Low Risk  (11/12/2024)    Interpersonal Safety     Do you feel physically and emotionally safe where you currently live?: Yes     Within the past 12 months, have you been hit, slapped, kicked or otherwise physically hurt by someone?: No     Within the past 12 months, have you been humiliated or emotionally abused in other ways by your partner or ex-partner?: No   Housing Stability: Low Risk  (5/13/2024)    Housing Stability     Do you have housing? : Yes     Are you worried about losing your housing?: No       Current Meds:  Current Outpatient Medications   Medication Sig Dispense Refill    acetaminophen (TYLENOL) 500 MG tablet Take 1,000 mg by mouth every 6 hours as needed for mild pain      calcium carbonate (OS-TAYLER) 500 MG tablet Take 1 tablet by mouth daily      fluticasone (FLONASE) 50 MCG/ACT nasal spray Spray 2 sprays into both nostrils daily (Patient not taking: Reported on 2/20/2025) 16 g 3    letrozole (FEMARA) 2.5 MG tablet Take 1 tablet (2.5 mg) by mouth daily 90 tablet 3    mometasone-formoterol (DULERA) 100-5 MCG/ACT inhaler Inhale 2 puffs into the lungs 2 times daily. 13 g 4    order for DME Equipment being ordered: 2  Mastectomy bras and 1 prosthetheses. 2 Piece 0    pantoprazole (PROTONIX) 40 MG EC tablet Take 1 tablet (40 mg) by mouth daily. 90 tablet 3    predniSONE (DELTASONE) 20 MG tablet Take two tablets (= 40mg) each day for 5 (five) days 10 tablet 0    traZODone (DESYREL) 50 MG tablet TAKE 1/2 TABLET(25 MG) BY MOUTH EVERY NIGHT AS NEEDED FOR SLEEP (Patient not taking: Reported on 2/20/2025) 45 tablet 2    VITAMIN D, CHOLECALCIFEROL, PO Take 1,000 Units by mouth daily            Physical Exam:     There were no vitals taken for this visit.  Gen: adult female, appears in NAD  Resp:  Respirations even and unlabored.  On RA.  No cough.   Neuro: alert and answering questions appropriately       Data:     Labs:  reviewed    Imaging studies:  I have personally reviewed all pertinent imaging studies and PFT results unless otherwise noted.    Recent Results (from the past 744 hours)   MR Lumbar Spine w/o & w Contrast    Narrative    EXAM: MR LUMBAR SPINE W/O & W CONTRAST  2/10/2025 5:53 PM     HISTORY:  severe acute onset midline back pain, known spinal  metastasis; Low back pain; r/o Cancer; No known/automatically detected  potential contraindications to imaging       COMPARISON:  CT CAP 1/6/2025    TECHNIQUE: Sagittal T1-weighted and T2-weighted and axial T2-weighted  images of the lumbar spine were obtained without intravenous contrast.  Post intravenous contrast using gadolinium axial and sagittal  T1-weighted images were obtained with fat saturation.    CONTRAST: gadobutrol (GADAVIST) injection 8 mL.    FINDINGS:  There are 5 lumbar type vertebrae, used for the purposes of this  dictation. Conus tip at L1. Normal lumbar vertebral alignment.  Heterogeneous marrow signal of the lumbar spine and visualized  portions of the sacrum/pelvis, compatible with known diffuse  metastatic disease. Most conspicuous lesions in the L1 and L3  vertebral bodies, as well as the right iliac bone. Mild disc space  narrowing with loss of the  intrinsic T2 signal at T12-L1 and L1-L2.  Normal cauda equina.    On a level by level basis, the findings are as follows:    L1-2: Circumferential disc osteophyte complex. Mild bilateral  neuroforaminal stenosis. Mild spinal canal stenosis.    L2-3: Circumferential disc bulge. Bilateral facet arthropathy. Mild  bilateral neural foraminal stenosis. No spinal canal stenosis.    L3-4: Bilateral facet arthropathy. Mild left neural foraminal  stenosis. Tiny incidental left perineural cyst. Osseous and  periarticular edema of the left facet joint.    L4-5: Bilateral facet arthropathy, right greater than left, and  ligamentum flavum hypertrophy. Mild bilateral neuroforaminal stenosis.  No spinal canal stenosis. Osseous and periarticular edema of the right  facet joint.    L5-S1: Circumferential disc bulge with annular fissuring in the  central zone. Bilateral facet arthropathy, left greater than right. No  spinal canal or neural foraminal stenosis.    The visualized paraspinous soft tissues are within normal limits.       Impression    IMPRESSION:  1. No acute fracture of the lumbar spine.  2. Multifocal metastases of the lumbar spine and visualized portions  of the sacrum/pelvis, similar to the CT 1/6/2025.  3. Multilevel lumbar spondylosis. No high-grade neuroforaminal or  spinal canal stenosis.  4. Active inflammatory arthropathy of the left L3-4 and right L4-5  facet joints.    I have personally reviewed the examination and initial interpretation  and I agree with the findings.    REBECCA OLMSTEAD MD         SYSTEM ID:  [id number]         Pulmonary Function Testing

## 2025-02-24 NOTE — PATIENT INSTRUCTIONS
Continue Dulera two puffs twice daily if your cough returns.  Rinse/gargle after use.  Call my nurse, Ganesh ([phone]) with any change or worsening of your breathing.  Follow-up in 3 months virtually.    Gwen Kim, CNP  Pulmonary Medicine  [facility]  [phone]

## 2025-02-24 NOTE — NURSING NOTE
Current patient location: [address] [place] [address]   [facility] [address]    Is the patient currently in the state of [place]? YES    Visit mode: VIDEO    If the visit is dropped, the patient can be reconnected by:VIDEO VISIT: Text to cell phone:   Telephone Information:   Mobile [phone]   Mobile [phone]       Will anyone else be joining the visit? NO  (If patient encounters technical issues they should call [phone]    Are changes needed to the allergy or medication list? Yes PT is not taking prednisone or trazodone   No changes to allergies.    Are refills needed on medications prescribed by this physician? Discuss with provider    Rooming Documentation:  Questionnaire(s) not pre-assigned    Reason for visit: RECHECK    Vinicio Jordan VVF      PT declined Mongolian    Name (if in person): CATALINA    ID # (if video/phone): NA  Language: Mongolian  Agency: NA  Phone Number: NA  Method of Interpretation: Video  Patient agrees to use  Services: No

## 2025-02-25 ENCOUNTER — HOSPITAL ENCOUNTER (OUTPATIENT)
Dept: PET IMAGING | Facility: CLINIC | Age: 70
Discharge: HOME | End: 2025-02-25
Attending: INTERNAL MEDICINE
Payer: COMMERCIAL

## 2025-02-25 DIAGNOSIS — Z17.0 CARCINOMA OF UPPER-OUTER QUADRANT OF LEFT BREAST IN FEMALE, ESTROGEN RECEPTOR POSITIVE (H): ICD-10-CM

## 2025-02-25 DIAGNOSIS — C50.412 CARCINOMA OF UPPER-OUTER QUADRANT OF LEFT BREAST IN FEMALE, ESTROGEN RECEPTOR POSITIVE (H): ICD-10-CM

## 2025-02-25 PROCEDURE — 78816 PET IMAGE W/CT FULL BODY: CPT | Mod: PS

## 2025-02-25 PROCEDURE — A9552 F18 FDG: HCPCS | Performed by: INTERNAL MEDICINE

## 2025-02-25 PROCEDURE — 78816 PET IMAGE W/CT FULL BODY: CPT | Mod: 26 | Performed by: RADIOLOGY

## 2025-02-25 PROCEDURE — 343N000001 HC RX 343 MED OP 636: Performed by: INTERNAL MEDICINE

## 2025-02-25 RX ORDER — FLUDEOXYGLUCOSE F 18 200 MCI/ML
10-18 INJECTION, SOLUTION INTRAVENOUS ONCE
Status: COMPLETED | OUTPATIENT
Start: 2025-02-25 | End: 2025-02-25

## 2025-02-25 RX ADMIN — FLUDEOXYGLUCOSE F 18 10.78 MILLICURIE: 200 INJECTION, SOLUTION INTRAVENOUS at 10:10

## 2025-02-27 ENCOUNTER — PATIENT OUTREACH (OUTPATIENT)
Dept: ONCOLOGY | Facility: CLINIC | Age: 70
End: 2025-02-27
Payer: COMMERCIAL

## 2025-02-27 ENCOUNTER — ONCOLOGY VISIT (OUTPATIENT)
Dept: ONCOLOGY | Facility: CLINIC | Age: 70
End: 2025-02-27
Attending: INTERNAL MEDICINE
Payer: COMMERCIAL

## 2025-02-27 ENCOUNTER — LAB (OUTPATIENT)
Dept: LAB | Facility: CLINIC | Age: 70
End: 2025-02-27
Attending: INTERNAL MEDICINE
Payer: COMMERCIAL

## 2025-02-27 VITALS
SYSTOLIC BLOOD PRESSURE: 118 MMHG | DIASTOLIC BLOOD PRESSURE: 76 MMHG | OXYGEN SATURATION: 94 % | HEART RATE: 91 BPM | RESPIRATION RATE: 12 BRPM | BODY MASS INDEX: 31.68 KG/M2 | TEMPERATURE: 98.2 F | WEIGHT: 179.9 LBS

## 2025-02-27 DIAGNOSIS — C50.412 CARCINOMA OF UPPER-OUTER QUADRANT OF LEFT BREAST IN FEMALE, ESTROGEN RECEPTOR POSITIVE (H): ICD-10-CM

## 2025-02-27 DIAGNOSIS — C50.412 CARCINOMA OF UPPER-OUTER QUADRANT OF LEFT BREAST IN FEMALE, ESTROGEN RECEPTOR POSITIVE (H): Primary | ICD-10-CM

## 2025-02-27 DIAGNOSIS — Z17.0 CARCINOMA OF UPPER-OUTER QUADRANT OF LEFT BREAST IN FEMALE, ESTROGEN RECEPTOR POSITIVE (H): ICD-10-CM

## 2025-02-27 DIAGNOSIS — C50.912 MALIGNANT NEOPLASM OF LEFT FEMALE BREAST, UNSPECIFIED ESTROGEN RECEPTOR STATUS, UNSPECIFIED SITE OF BREAST (H): ICD-10-CM

## 2025-02-27 DIAGNOSIS — Z51.11 ENCOUNTER FOR ANTINEOPLASTIC CHEMOTHERAPY: ICD-10-CM

## 2025-02-27 DIAGNOSIS — Z17.0 CARCINOMA OF UPPER-OUTER QUADRANT OF LEFT BREAST IN FEMALE, ESTROGEN RECEPTOR POSITIVE (H): Primary | ICD-10-CM

## 2025-02-27 DIAGNOSIS — C79.51 MALIGNANT NEOPLASM METASTATIC TO BONE (H): ICD-10-CM

## 2025-02-27 LAB
ALBUMIN SERPL BCG-MCNC: 4.1 G/DL (ref 3.5–5.2)
ALP SERPL-CCNC: 59 U/L (ref 40–150)
ALT SERPL W P-5'-P-CCNC: 49 U/L (ref 0–50)
ANION GAP SERPL CALCULATED.3IONS-SCNC: 9 MMOL/L (ref 7–15)
AST SERPL W P-5'-P-CCNC: 41 U/L (ref 0–45)
BASOPHILS # BLD AUTO: 0 10E3/UL (ref 0–0.2)
BASOPHILS NFR BLD AUTO: 0 %
BILIRUB SERPL-MCNC: 0.2 MG/DL
BUN SERPL-MCNC: 13.5 MG/DL (ref 8–23)
CALCIUM SERPL-MCNC: 9.3 MG/DL (ref 8.8–10.4)
CEA SERPL-MCNC: 1.2 NG/ML
CHLORIDE SERPL-SCNC: 106 MMOL/L (ref 98–107)
CREAT SERPL-MCNC: 0.86 MG/DL (ref 0.51–0.95)
EGFRCR SERPLBLD CKD-EPI 2021: 73 ML/MIN/1.73M2
EOSINOPHIL # BLD AUTO: 0.3 10E3/UL (ref 0–0.7)
EOSINOPHIL NFR BLD AUTO: 3 %
ERYTHROCYTE [DISTWIDTH] IN BLOOD BY AUTOMATED COUNT: 13.2 % (ref 10–15)
GLUCOSE SERPL-MCNC: 91 MG/DL (ref 70–99)
HCO3 SERPL-SCNC: 28 MMOL/L (ref 22–29)
HCT VFR BLD AUTO: 40.5 % (ref 35–47)
HGB BLD-MCNC: 12.6 G/DL (ref 11.7–15.7)
IMM GRANULOCYTES # BLD: 0 10E3/UL
IMM GRANULOCYTES NFR BLD: 0 %
LYMPHOCYTES # BLD AUTO: 3.3 10E3/UL (ref 0.8–5.3)
LYMPHOCYTES NFR BLD AUTO: 38 %
Lab: NORMAL
MCH RBC QN AUTO: 29 PG (ref 26.5–33)
MCHC RBC AUTO-ENTMCNC: 31.1 G/DL (ref 31.5–36.5)
MCV RBC AUTO: 93 FL (ref 78–100)
MONOCYTES # BLD AUTO: 0.6 10E3/UL (ref 0–1.3)
MONOCYTES NFR BLD AUTO: 7 %
NEUTROPHILS # BLD AUTO: 4.4 10E3/UL (ref 1.6–8.3)
NEUTROPHILS NFR BLD AUTO: 52 %
NRBC # BLD AUTO: 0 10E3/UL
NRBC BLD AUTO-RTO: 0 /100
PERFORMING LABORATORY: NORMAL
PLATELET # BLD AUTO: 189 10E3/UL (ref 150–450)
POTASSIUM SERPL-SCNC: 4.3 MMOL/L (ref 3.4–5.3)
PROT SERPL-MCNC: 7.7 G/DL (ref 6.4–8.3)
RBC # BLD AUTO: 4.35 10E6/UL (ref 3.8–5.2)
SODIUM SERPL-SCNC: 143 MMOL/L (ref 135–145)
SPECIMEN STATUS: NORMAL
TEST NAME: NORMAL
WBC # BLD AUTO: 8.6 10E3/UL (ref 4–11)

## 2025-02-27 PROCEDURE — 250N000011 HC RX IP 250 OP 636: Mod: JZ | Performed by: INTERNAL MEDICINE

## 2025-02-27 PROCEDURE — G0463 HOSPITAL OUTPT CLINIC VISIT: HCPCS | Performed by: INTERNAL MEDICINE

## 2025-02-27 PROCEDURE — 84155 ASSAY OF PROTEIN SERUM: CPT

## 2025-02-27 PROCEDURE — 36591 DRAW BLOOD OFF VENOUS DEVICE: CPT

## 2025-02-27 PROCEDURE — 86300 IMMUNOASSAY TUMOR CA 15-3: CPT

## 2025-02-27 PROCEDURE — 99214 OFFICE O/P EST MOD 30 MIN: CPT | Performed by: INTERNAL MEDICINE

## 2025-02-27 PROCEDURE — 82378 CARCINOEMBRYONIC ANTIGEN: CPT

## 2025-02-27 PROCEDURE — 82374 ASSAY BLOOD CARBON DIOXIDE: CPT

## 2025-02-27 PROCEDURE — 96372 THER/PROPH/DIAG INJ SC/IM: CPT | Performed by: INTERNAL MEDICINE

## 2025-02-27 PROCEDURE — 250N000011 HC RX IP 250 OP 636: Performed by: INTERNAL MEDICINE

## 2025-02-27 PROCEDURE — 85025 COMPLETE CBC W/AUTO DIFF WBC: CPT

## 2025-02-27 RX ORDER — HEPARIN SODIUM (PORCINE) LOCK FLUSH IV SOLN 100 UNIT/ML 100 UNIT/ML
5 SOLUTION INTRAVENOUS ONCE
Status: COMPLETED | OUTPATIENT
Start: 2025-02-27 | End: 2025-02-27

## 2025-02-27 RX ADMIN — DENOSUMAB 120 MG: 120 INJECTION SUBCUTANEOUS at 13:14

## 2025-02-27 RX ADMIN — Medication 5 ML: at 13:45

## 2025-02-27 ASSESSMENT — PAIN SCALES - GENERAL: PAINLEVEL_OUTOF10: MODERATE PAIN (5)

## 2025-02-27 NOTE — NURSING NOTE
"Oncology Rooming Note    February 27, 2025 11:55 AM   Hoda Brush is a 70 year old female who presents for:    Chief Complaint   Patient presents with    Oncology Clinic Visit     Carcinoma of upper-outer quadrant of left breast in female, estrogen receptor positive     Initial Vitals: /76 (BP Location: Right arm, Patient Position: Sitting, Cuff Size: Adult Regular)   Pulse 91   Temp 98.2  F (36.8  C) (Oral)   Resp 12   Wt 81.6 kg (179 lb 14.4 oz)   SpO2 94%   BMI 31.68 kg/m   Estimated body mass index is 31.68 kg/m  as calculated from the following:    Height as of 1/30/25: 1.605 m (5' 3.19\").    Weight as of this encounter: 81.6 kg (179 lb 14.4 oz). Body surface area is 1.91 meters squared.  Moderate Pain (5) Comment: Data Unavailable   No LMP recorded. Patient is postmenopausal.  Allergies reviewed: Yes  Medications reviewed: Yes    Medications: MEDICATION REFILLS NEEDED TODAY. Provider was notified.  Pharmacy name entered into ImmuVen: [id number] [place], [place] - [address]    Frailty Screening:   Is the patient here for a new oncology consult visit in cancer care? 2. No    PHQ9:  Did this patient require a PHQ9?: No      Clinical concerns: Patient states no new concerns to discuss with provider.        Kadie Soriano, EMT            "

## 2025-02-27 NOTE — PROGRESS NOTES
New Patient Oncology Nurse Navigator Note     Referring provider: Dr. Lisandro Aguiar     Referring Clinic/Organization: [facility]     Referred to (specialty:) Radiation Oncology      Date Referral Received: February 27, 2025     Evaluation for:  C50.412, Z17.0 (ICD-10-CM) - Carcinoma of upper-outer quadrant of left breast in female, estrogen receptor positive (H)     Radiation Oncology consultation was performed 03/11/2014, and she was given radiation in 1 dose of 6 Gy to the right iliac lesion.   WAS THIS IN [facility] or in [place]?    Have you received radiation therapy in the past? Yes    2/27 2854 Writer received referral, reviewed for appropriate plan, and referral transferred to New Patient Scheduling for completion.    Records Location: Care Everywhere, Media, and See Bookmarked material     Records Needed:  Radiation oncology notes (per protocol) from 2014. (Unclear if in [place] or [facility]).  Radiation summary (per protocol)

## 2025-02-27 NOTE — NURSING NOTE
"Chief Complaint   Patient presents with    Labs Only    Port Draw     Labs drawn via port by RN.     Port accessed with 20 gauge, 3/4\" power needle by RN, labs collected, line flushed with saline and heparin, then de-accessed.    Carolyn Polanco RN    "

## 2025-02-27 NOTE — PROGRESS NOTES
[facility]: Cancer Care                                                                                          Carmaura Assure will be drawn today.  Blue box brought to [facility] 2nd floor lab.     Holley Armijo RN

## 2025-02-27 NOTE — LETTER
2/27/2025      Hoda Brush  [address]  [facility] [facility] [address]      Dear Colleague,    Thank you for referring your patient, Hoda Brush, to the [facility]. Please see a copy of my visit note below.    ONCOLOGY NOTE     Hoda Brush  Female, 69 year old, 1955  MRN: [id number]        Hoda is a 69 year old patient from [facility] and is seen here for continuation of care for her metastatic ER+HER2- breast cancer.  She is a refugee from [facility] and was initially seen in clinic with her daughter.  The only data we have from [facility] is an English translation of her records.       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  On 02/11/2014, she was seen by an oncologist.  The clinical staging of T4b N2 M1 was noted.   Her breast cancer was on the left side. There was no right breast cancer, confirmed by the patient and her daughter, correcting a possible error in the translated records.  ER was positive in 100% of the cells, OR at 14% and HER2 was 3+ positive.  It appears that this histopathologic information is on the mastectomy specimen. She underwent an MRI for staging of presumptive bone metastases which was performed 03/02/2014.  There were skeletal metastases in the thoracic vertebrae at 12, L1, L3, L5 and S1 vertebral body, ranging in size from 0.7-3.0 cm in size.  Ischial and right femur were also involved, as well as a large iliac mass measuring about 15 cm in the uterus. 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   Radiation Oncology consultation was performed 03/11/2014, and she was given radiation in 1 dose of 6 Gy to the right iliac lesion.        With the initial diagnosis, she initiated treatment with 6 cycles of CAF neoadjuvant chemotherapy 02/14, 07/07, 03/28, 04/18/14, 05/08 and 05/29/14. She also received monthly zoledronic acid.  She then underwent a  modified left mastectomy of Palacios type and left lymphadenoectomy on 06/27/2014.   Pathologic examination showed number at [facility] was [id number] showed infiltrative grade 2 ductal cancer with 6 level 1 metastatic lympFollow up with Jossie for visits and trastuzumab on 2-5, 2-26, 3-19 with CBC, CMP.  Echocardiogram on 3-19.  Follow up with me 4-9 with CBC, CMP, CA27.29 and CEA and with CT CAP on 4-8.h nodes and 3 level 2 metastatic lymph nodes.  The differentiation was intermediate.  The tumor was ER positive 70% of the cells, IA positive in 40% of the cells and HER2 was 3+ by immunohistochemistry and the Ki-67 labeling index was 20%. Her staging after surgery was stage IV, pT4b N2 M1.  I don't see a biopsy of the skeletal metastases.  She then had continuation with chemotherapy with 4 cycles of Herceptin and taxane with monthly zoledronic acid.  Tamoxifen was initiated.  She then had two years of Herceptin and a decision was made not to continue further Herceptin.  She continued on zoledronic acid every 3 months. She was clinically stable. [place] then [place] the [place] new [place] from [facility].  She has now been changed to letrozole.  Denosumab has now been held for new diagnosis of osteonecrosis of the R maxilla, resumed 2/2025 after a clear from a dentist.     History of cholecystectomy 2020.      TREATMENT HISTORY:  A. Initial diagnosis with metastatic breast cancer in [facility].  Neoadjuvant CAF x 6.   B. Left mastectomy. Left axillary node dissection.  C.  Radiation in 1 dose to R iliac region.  C. Herceptin for 2 years taxane for a prescribed course then stopped, monthly zoledronic acid.  She had 2 years of Herceptin with tamoxifen added after chemotherapy.  D.  Tamoxifen alone and zoledronic acid every 3 months starting 2014.  Letrozole was started   E.  [place]  We restarted Herceptin every 3 weeks and continued tamoxifen. Bone targeted agent changed to denosumab every 9 weeks. Zometa  discontinued 2017.  Tamoxifen was changed to letrozole August 2019.    F.  Xgeva discontinued 12-17-19 because of dental issues.   G.  J+J vaccine March, 2021.  H.  Was on Zometa once May 11.  Reaction with eye lid swelling. Discontinued Zometa.  I.  Restart Xgeva 6-22-21.  Continuing the trastuzumab and letrozole.  Both tolerated well.   J.  Discontinue Xgeva because of osteonecrosis of the maxilla.   Continue trastuzumab and letrozole.   K. Acute back pain.  L spine 2/10/2025 with stable bone metastases without cord compression and negative PET in bone lesions. L1 with periosteal involvement. Continue trantuzumab/letrozole. Referral to rad onc, neurosurgery, and palliative. Sent liquid biopsy. Clear by a dentist, resumed Xgeva 2/27/2025.      INTERVAL HISTORY  She continues on trastuzumab, letrozole.    Had a very bad back pain acutely 2 weeks ago and went to the ED. MRI 2/10/2025 with stable bone mets without cord compression. The pain is mainly in the middle of the back, not radiating, sharp and intermittent. No bowel/bladder involvement. No weakness or numbness. She now could not sit or stand for long and needs to change the posture. She only uses tylenol and doesn't want to escalate her regimen.  She rates the back pain a 5 out of 10.  The pain is controlled with Tylenol and she does not want an opiate pain medicines.  We will obtain consultation with radiation oncology.    Walks on a treadmill 20 mins/day. Limited activities due to back pain. Denies other side effect. Good appetite. No respiratory symptoms.     No current teeth issues. Was cleared by a dentist.     REVIEW OF SYSTEMS:  A 10-point review of systems is entirely negative.     She is a eating a Mediterranean-style diet.  She is exercising by swimming.  I did advise adding a weightbearing exercise.  She takes vitamin D3 2000 International Units per day and calcium.  Her last DEXA scan performed a week ago shows a most valid negative T-score of  -2.5 in the left hip, consistent with osteoporosis.     PHYSICAL EXAMINATION:    VITALS: /76 (BP Location: Right arm, Patient Position: Sitting, Cuff Size: Adult Regular)   Pulse 91   Temp 98.2  F (36.8  C) (Oral)   Resp 12   Wt 81.6 kg (179 lb 14.4 oz)   SpO2 94%   BMI 31.68 kg/m    GENERAL:  Hoda appeared generally well and independent  HEENT:  She has no alopecia.  Examination of oropharynx reveals good dentition.  LYMPH:  There is no cervical, supraclavicular, subclavicular or axillary lymphadenopathy.  BREASTS:  Deferred  LUNGS:  Clear to percussion and auscultation.  CARDIAC:  Heart has a regular rate and rhythm, S1, S2.  ABDOMEN:  Soft, nontender, without hepatosplenomegaly.  EXTREMITIES:  Without edema.  PSYCH:  Mood and affect were normal.     LABORATORY DATA:    BMP stable with Cr 1.0. Mildly elevated AST and ALT (78/74) from (44/52)  CBC wnl.  CEA 1.1 (stable)     1. PET of the neck, chest, abdomen, and pelvis.  2. PET CT Fusion for Attenuation Correction and Anatomical  Localization:    3. 3D MIP and PET-CT fused images were processed on an independent  workstation and archived to PACS and reviewed by a radiologist.    PET February 25, 2025     Technique:     1. PET: The patient received 10.78 mCi of F-18-FDG; the serum glucose  was 104 mg/dL prior to administration, body weight was 81.9 kg. Images  were evaluated in the axial, sagittal, and coronal planes as well as  the rotational whole body MIP. Images were acquired from the Vertex to  the Feet.     UPTAKE WAS MEASURED AT 60 MINUTES.      2. CT: CT only obtained for attenuation correction and not diagnostic  purposes.     INDICATION: Carcinoma of upper-outer quadrant of left breast in  female, estrogen receptor positive (H); Carcinoma of upper-outer  quadrant of left breast in female, estrogen receptor positive (H)     ADDITIONAL INFORMATION OBTAINED FROM EMR: 70-year-old female diagnosed  in 2014 with a left breast cancer. Clinical  staging of T4b N2 M1. ER  was positive in 100% of the cells, LA at 14% and HER2 was 3+ positive.   There were skeletal metastases in the thoracic vertebrae at 12, L1,  L3, L5 and S1 vertebral body. Ischial and right femur were also  involved, as well as a large iliac mass. Status post radiation in 1  dose of 6 Gy to the right iliac lesion. Patient currently on letrozole  and trastuzuamb.  Denosumab has now been held for new diagnosis of  osteonecrosis of the R maxilla.     COMPARISON: CT 1/6/2025, PET CT 12/21/2017     FINDINGS:   BACKGROUND: Liver SUV max = 3.40, Aorta Blood SUV max = 3.38.      HEAD/NECK:  Few mildly avid nonenlarged nodes are similar to prior:   Right level 2 lymph node measures 7 x 12 mm (4/71) with a maximum SUV  of 3.70. Right parotid lymph node measuring 10 x 5 mm (4/75) with a  max SUV of 2.86.  Similar-appearing segmental sclerosis in the right hemimandible  medullary cavity.     CHEST:  No abnormal uptake. Postprocedural changes of left mastectomy.  Slightly decreased size of 5mm inferior anterior right middle lobe  (4/154) nodule, and unchanged 5 mm nodule at the left lung base  (4/154). These nodules do not demonstrate increased uptake (they are  below PET threshold) .     ABDOMEN AND PELVIS:  No abnormal uptake. Myomatous uterus. Hepatic steatosis.  Cholecystectomy.     LOWER EXTREMITIES:   No abnormal uptake.      BONES AND SOFT TISSUES:   No abnormal uptake. There are innumerable sclerotic lesions present  throughout the ribs, vertebral bodies, right femur, and pelvis. These  demonstrate heterogenous appearing uptake which is now below  background. Resolution of previously seen residual uptake by the right  iliac bone particularly acetabulum. iliac bone. No suspicious uptake.                                                                      IMPRESSION:   In this patient with history of metastatic left breast cancer:  1. No metabolically active disease. Stable appearance of the  sclerotic  lesions throughout the axial and appendicular skeleton.  2. Stable couple of 5 mm pulmonary nodules.  3. Mildly avid nonenlarged right cervical lymph nodes are likely  reactive.     I have personally reviewed the examination and initial interpretation  and I agree with the findings.     DARLING ANNA MD     EXAM: MR LUMBAR SPINE W/O & W CONTRAST  2/10/2025 5:53 PM      HISTORY:  severe acute onset midline back pain, known spinal  metastasis; Low back pain; r/o Cancer; No known/automatically detected  potential contraindications to imaging        COMPARISON:  CT CAP 1/6/2025     TECHNIQUE: Sagittal T1-weighted and T2-weighted and axial T2-weighted  images of the lumbar spine were obtained without intravenous contrast.  Post intravenous contrast using gadolinium axial and sagittal  T1-weighted images were obtained with fat saturation.     CONTRAST: gadobutrol (GADAVIST) injection 8 mL.     FINDINGS:  There are 5 lumbar type vertebrae, used for the purposes of this  dictation. Conus tip at L1. Normal lumbar vertebral alignment.  Heterogeneous marrow signal of the lumbar spine and visualized  portions of the sacrum/pelvis, compatible with known diffuse  metastatic disease. Most conspicuous lesions in the L1 and L3  vertebral bodies, as well as the right iliac bone. Mild disc space  narrowing with loss of the intrinsic T2 signal at T12-L1 and L1-L2.  Normal cauda equina.     On a level by level basis, the findings are as follows:     L1-2: Circumferential disc osteophyte complex. Mild bilateral  neuroforaminal stenosis. Mild spinal canal stenosis.     L2-3: Circumferential disc bulge. Bilateral facet arthropathy. Mild  bilateral neural foraminal stenosis. No spinal canal stenosis.     L3-4: Bilateral facet arthropathy. Mild left neural foraminal  stenosis. Tiny incidental left perineural cyst. Osseous and  periarticular edema of the left facet joint.     L4-5: Bilateral facet arthropathy, right greater than  left, and  ligamentum flavum hypertrophy. Mild bilateral neuroforaminal stenosis.  No spinal canal stenosis. Osseous and periarticular edema of the right  facet joint.     L5-S1: Circumferential disc bulge with annular fissuring in the  central zone. Bilateral facet arthropathy, left greater than right. No  spinal canal or neural foraminal stenosis.     The visualized paraspinous soft tissues are within normal limits.                                                                       IMPRESSION:  1. No acute fracture of the lumbar spine.  2. Multifocal metastases of the lumbar spine and visualized portions  of the sacrum/pelvis, similar to the CT 1/6/2025.  3. Multilevel lumbar spondylosis. No high-grade neuroforaminal or  spinal canal stenosis.  4. Active inflammatory arthropathy of the left L3-4 and right L4-5  facet joints.     I have personally reviewed the examination and initial interpretation  and I agree with the findings.     REBECCA OLMSTEAD MD         ASSESSMENT AND PLAN:       Hoda Brush is a 69-year-old woman with a history of ER-positive, CO-positive, HER2 positive breast cancer.  She is [place] [facility] [place] live in the [place] with her daughter.  The tumor is ER positive, CO positive and HER2 positive.  She had metastatic disease at the time of presentation with bone-only metastases by report.  She underwent neoadjuvant CAF, had a left mastectomy, left axillary lymph node dissection, radiation to the right hip, which included the right iliac region where she had metastatic disease.  She was initially on tamoxifen but then was switched to letrozole.  She continues on this and every 3-week trastuzumab.  She has had no evidence of disease progression for the last 7 years and possible longer but we don't have detailed data from [facility].  Markers are low and stable.  We have continued Herceptin every 3 weeks as well as daily letrozole. CT CAP shows stable pulmonary nodules.    Hoda Brush continues to do well on a combination of trastuzumab and letrozole. She has no evidence of disease progression.   Her ejection fraction remains stable.   She continues to do well on trastuzumab and letrozole.   She will continue with every 4-month CT of the chest, abdomen and pelvis.    New back pain without radiculopathy. MRI L spine/PET 2/2025 with stable bone lesions, no FDG avidity, L1 is likely a culprit lesion per clinical. Referral to radiation oncology (role of palliative radiation), neurosurgery (supportive mx), and palliative (pain control). Plan to repeat MRI L spine in 4 months.  On reviewing the MRI of the lumbar spine I do think that there is an area of penetration of the bone cortex and posterior aspect of the L1 vertebral body and we will refer her to radiation oncology.  Although this lesion is not metabolically active it is possible that penetration of the periosteum may be resulting in pain and she may benefit from a single fraction of radiation or what ever would be recommended by radiation oncology.  The right maxillary osteonecrosis has healed and she is able to continue with Xgeva every 3 months, resumed on 2/27/2025.    Echo. Stable EF.  60-65%.  Pain control.  She wants to continue with Tylenol.  She does not want to get a medicine at this time.  Caris assure testing will be sent.  We are looking for possible ESR1 mutation.  We will continue with letrozole for now.  Follow up.  Radiation oncology consultation this coming week for lumbar pain.  Neurosurgery consultation.  Known metastases.  Continue Herceptin through the end of 2025 alternating providers every 9 weeks. CT CAP will be moved up to every 2 months.  Follow up with Kassidy March 13 with CBC, CMP, CA27.29 and CEA.  Xgeva can be restarted because dental work is completed.  Please send Caris assure test Dexa  July 25 2025.  CBC, CMP every 3 weeks and CA27.29 and CEA every 6 weeks. Echo every 3 months, next on March  12.  CT CAP every 4 months, next May 14 with visit with me on May 15 with trastuzumab cycle 131.        Thank you for allowing us to participate in this patient's care.       Sincerely,      Lisandro Aguiar MD  Professor  [facility]  [phone]           I spent 35 minutes with the patient more than 50% of which was in counseling and coordination of care.      Again, thank you for allowing me to participate in the care of your patient.        Sincerely,        Lisandro Aguiar MD    Electronically signed

## 2025-02-28 LAB — CANCER AG27-29 SERPL-ACNC: 14.9 U/ML

## 2025-03-03 DIAGNOSIS — C79.51 MALIGNANT NEOPLASM METASTATIC TO BONE (H): Primary | ICD-10-CM

## 2025-03-03 NOTE — PROGRESS NOTES
Dr Burns and Dr Crocker did not think this pt would be a candidate for osteo cool    Refer to ortho on instead     Referral placed  Updated referring provider  Denied referral

## 2025-03-03 NOTE — TELEPHONE ENCOUNTER
MEDICAL RECORDS REQUEST   Radiation Oncology  [address] [place] [address]  Fax: [phone]          FUTURE VISIT INFORMATION                                                   Hoda I Gonsalo, : 1955 scheduled for future visit at [facility]    RECORDS REQUESTED FOR VISIT                                                     BREAST     OFFICE NOTE from medical oncologist [facility] 2025 - Dr. Lisandro Aguiar   CHEMOTHERAPY NOTES/LOG Epic 2025 - C2D1 Trastuzumab-qyyp    MEDICATION LIST [facility]    LABS     PATHOLOGY REPORTS [facility] 2017 - [id number]    ANYTHING RELATED TO DIAGNOSIS Epic 2025   IMAGING (NEED IMAGES & REPORT)     CT SCANS PACS CT CAP: 2025-2/3/2020   MRI     MAMMO/SURGICAL BREAST IMGS/SPECIMEN RADIOGRAPH     XRAYS     ULTRASOUND     PET PACS PET Whole Body: 2025   PREVIOUS RADIATION     WHAT HEALTHCARE FACILITY     RADIATION ONCOLOGIST NOTES     RADIATION TREATMENT SUMMARY NOTES     RADIATION TREATMENT PLAN     DVH = DOSE VOLUME HISTOGRAM     DICOM CD (TX PLANNING CT, TX PLAN, STRUCTURE SET) *If no DICOM avail ask for DRRs     *[facility] and [facility] patients send to [facility] with ATTN: FRANCISCO/Cem Dosi/Physics    *only [facility] patient's, use [facility]

## 2025-03-04 ENCOUNTER — OFFICE VISIT (OUTPATIENT)
Dept: RADIATION ONCOLOGY | Facility: CLINIC | Age: 70
End: 2025-03-04
Attending: INTERNAL MEDICINE
Payer: COMMERCIAL

## 2025-03-04 ENCOUNTER — TRANSCRIBE ORDERS (OUTPATIENT)
Dept: OTHER | Age: 70
End: 2025-03-04

## 2025-03-04 ENCOUNTER — PRE VISIT (OUTPATIENT)
Dept: RADIATION ONCOLOGY | Facility: CLINIC | Age: 70
End: 2025-03-04

## 2025-03-04 VITALS
OXYGEN SATURATION: 96 % | RESPIRATION RATE: 18 BRPM | TEMPERATURE: 97.9 F | DIASTOLIC BLOOD PRESSURE: 79 MMHG | SYSTOLIC BLOOD PRESSURE: 152 MMHG | HEART RATE: 81 BPM

## 2025-03-04 DIAGNOSIS — C79.51 MALIGNANT NEOPLASM METASTATIC TO BONE (H): Primary | ICD-10-CM

## 2025-03-04 DIAGNOSIS — C50.412 CARCINOMA OF UPPER-OUTER QUADRANT OF LEFT BREAST IN FEMALE, ESTROGEN RECEPTOR POSITIVE (H): ICD-10-CM

## 2025-03-04 DIAGNOSIS — Z17.0 CARCINOMA OF UPPER-OUTER QUADRANT OF LEFT BREAST IN FEMALE, ESTROGEN RECEPTOR POSITIVE (H): ICD-10-CM

## 2025-03-04 ASSESSMENT — PAIN SCALES - GENERAL: PAINLEVEL_OUTOF10: MODERATE PAIN (5)

## 2025-03-04 NOTE — LETTER
3/4/2025      Hoda Brush  [address]  [facility] [address]      Dear Colleague,    Thank you for referring your patient, Hoda Brush, to the [facility] RADIATION ONCOLOGY [place]. Please see a copy of my visit note below.    [facility] Radiation Oncology Consult Note     Patient: Hoda Brush  MRN: [id number]  Date of Service: 03/04/2025          Lisandro Aguiar MD  [address]  [place] [address]       Dear Dr. Aguiar:    Thank you very much for referring this patient for consideration of radiotherapy. As you know Ms. Brush is a 70 year old female with a diagnosis of metastatic breast cancer since approximately 2014.. She was seen today for consideration of radiation therapy secondary to pain..     Oncological diagnosis: Metastatic breast cancer in 2014 what appears to be a clinical stage of T4b N2 M1.     Chief complaint: Chronic and worsening pain of the lower back and pelvis    Treatment: CAF neoadjuvant chemotherapy to 2/4 the 5/14.  Followed by 4 cycles of Herceptin taxane with monthly zoledronic acid.  Tamoxifen therapy.  This was then changed to letrozole and she also received Denosumab.     Entire visit and physical exam was performed with my nurse Tracy present at all times.    HISTORY OF PRESENT ILLNESS:   Ms. Brush is a 70 year old female who from [facility].  She was diagnosed with metastatic breast cancer in 2014 what appears to be a clinical stage of T4b N2 M1.  ER positive [place] positive HER2/kenia 3+.  Multiple skeletal metastasis were identified at that presentation by report.  Specifically T12-L5 and S1.  Right femur was also involved she received a total dose of 6 Gray in 1 fraction to the iliac lesion.    The patient has been treated very successfully with systemic agents.  PET scan from 2/25/2025 shows no active disease or PET activity in the bones consistent with a good clinical response.  MRI on 2/10/2025  reveals multifocal metastasis of the lumbar and visual portion of the sacrum and pelvis similar to 1/6/2025.  Active inflammatory arthropathy of the left L3 L5 and right L4-L5 facet joints.  No acute fracture of the lumbar spine.    The patient reports that the pain of the lower back and extremity has not acutely change.  Is has been chronic and progressive over a slow period of time.  It is worsening over the last several months.  It is not associated with any bladder and bowel incontinence muscle weakness.  However it is difficult for her to sit.    The patient has an appointment with neurosurgery for evaluation.     We were asked to see if radiation can be of benefit in her complete response to systemic therapy.    CHEMOTHERAPY HISTORY: Concurrent Chemotherapy: No    RADIATION THERAPY HISTORY: Prior Radiation: Yes    IMPLANTED CARDIAC DEVICE: none     PREGNANCY: N/A    Current Outpatient Medications   Medication Sig Dispense Refill     acetaminophen (TYLENOL) 500 MG tablet Take 1,000 mg by mouth every 6 hours as needed for mild pain       calcium carbonate (OS-TAYLER) 500 MG tablet Take 1 tablet by mouth daily       fluticasone (FLONASE) 50 MCG/ACT nasal spray Spray 2 sprays into both nostrils daily 16 g 3     letrozole (FEMARA) 2.5 MG tablet Take 1 tablet (2.5 mg) by mouth daily 90 tablet 3     order for DME Equipment being ordered: 2 Mastectomy bras and 1 prosthetheses. 2 Piece 0     pantoprazole (PROTONIX) 40 MG EC tablet Take 1 tablet (40 mg) by mouth daily. 90 tablet 3     VITAMIN D, CHOLECALCIFEROL, PO Take 1,000 Units by mouth daily       mometasone-formoterol (DULERA) 100-5 MCG/ACT inhaler Inhale 2 puffs into the lungs 2 times daily. (Patient not taking: Reported on 3/4/2025) 13 g 4     predniSONE (DELTASONE) 20 MG tablet Take two tablets (= 40mg) each day for 5 (five) days (Patient not taking: Reported on 2/24/2025) 10 tablet 0     traZODone (DESYREL) 50 MG tablet TAKE 1/2 TABLET(25 MG) BY MOUTH EVERY NIGHT  AS NEEDED FOR SLEEP (Patient not taking: Reported on 1/30/2025) 45 tablet 2     Past Medical History:   Diagnosis Date     Breast cancer metastasized to bone (H)      Lymphedema of left upper extremity      Personal history of chemotherapy      S/P radiation therapy     6 Gy to right iliac region in 11/2014 - [facility]     Past Surgical History:   Procedure Laterality Date     APPENDECTOMY       BIOPSY  2014     COLONOSCOPY N/A 2/1/2018    Procedure: COLONOSCOPY;  Colonoscopy;  Surgeon: Phillip Rowe MD;  Location: [facility]     ESOPHAGOSCOPY, GASTROSCOPY, DUODENOSCOPY (EGD), COMBINED N/A 2/16/2018    Procedure: COMBINED ESOPHAGOSCOPY, GASTROSCOPY, DUODENOSCOPY (EGD), BIOPSY SINGLE OR MULTIPLE;;  Surgeon: Paty Herman MD;  Location: [facility]     ESOPHAGOSCOPY, GASTROSCOPY, DUODENOSCOPY (EGD), COMBINED N/A 2/5/2020    Procedure: ESOPHAGOGASTRODUODENOSCOPY (EGD);  Surgeon: Anthony Alonso MD;  Location: [facility]     INSERT PORT VASCULAR ACCESS Right 9/15/2017    Procedure: INSERT PORT VASCULAR ACCESS;  Central venous chest port placement, right;  Surgeon: Dmitriy Hernandez PA-C;  Location: [facility]     LAPAROSCOPIC CHOLECYSTECTOMY N/A 8/31/2019    Procedure: LAPAROSCOPIC CHOLECYSTECTOMY;  Surgeon: Maldonado Billy MD;  Location: [facility] OR     MASTECTOMY Left 06/27/2014    and lymph node resection     MASTECTOMY SIMPLE BILATERAL      Mastectomy 06/27/2014     Allergies   Allergen Reactions     Zometa [Zoledronic Acid] Swelling     Family History   Problem Relation Age of Onset     Breast Cancer Mother 45     Lung Cancer Father         smoker     Breast Cancer Sister 61     Social History     Socioeconomic History     Marital status:      Spouse name: Not on file     Number of children: Not on file     Years of education: Not on file     Highest education level: Not on file   Occupational History     Not on file   Tobacco Use     Smoking status: Never     Passive exposure: Never      Smokeless tobacco: Never   Vaping Use     Vaping status: Never Used   Substance and Sexual Activity     Alcohol use: No     Drug use: No     Sexual activity: Not Currently     Partners: Male   Other Topics Concern     Parent/sibling w/ CABG, MI or angioplasty before 65F 55M? No   Social History Narrative     Not on file     Social Drivers of Health     Financial Resource Strain: Low Risk  (5/13/2024)    Financial Resource Strain      Within the past 12 months, have you or your family members you live with been unable to get utilities (heat, electricity) when it was really needed?: No   Food Insecurity: Low Risk  (5/13/2024)    Food Insecurity      Within the past 12 months, did you worry that your food would run out before you got money to buy more?: No      Within the past 12 months, did the food you bought just not last and you didn t have money to get more?: No   Transportation Needs: Low Risk  (5/13/2024)    Transportation Needs      Within the past 12 months, has lack of transportation kept you from medical appointments, getting your medicines, non-medical meetings or appointments, work, or from getting things that you need?: No   Physical Activity: Unknown (5/13/2024)    Exercise Vital Sign      Days of Exercise per Week: Patient declined      Minutes of Exercise per Session: 30 min   Stress: No Stress Concern Present (5/13/2024)    [place] [employer] - Occupational Stress Questionnaire      Feeling of Stress : Not at all   Social Connections: Unknown (5/13/2024)    Social Connection and Isolation Panel [NHANES]      Frequency of Communication with Friends and Family: Not on file      Frequency of Social Gatherings with Friends and Family: Twice a week      Attends Bahai Services: Not on file      Active Member of Clubs or Organizations: Not on file      Attends Club or Organization Meetings: Not on file      Marital Status: Not on file   Interpersonal Safety: Low Risk  (11/12/2024)     Interpersonal Safety      Do you feel physically and emotionally safe where you currently live?: Yes      Within the past 12 months, have you been hit, slapped, kicked or otherwise physically hurt by someone?: No      Within the past 12 months, have you been humiliated or emotionally abused in other ways by your partner or ex-partner?: No   Housing Stability: Low Risk  (5/13/2024)    Housing Stability      Do you have housing? : Yes      Are you worried about losing your housing?: No        REVIEW OF SYMPTOMS:  A full 14-point review of systems was performed. Pertinent findings are noted in the HPI.    General  Constitutional  Constitutional (WDL): All constitutional elements are within defined limits  EENT  Eye Disorders  Eye Disorder (WDL): All eye disorder elements are within defined limits  Ear Disorders  Ear Disorder (WDL): All ear disorder elements are within defined limits  Respiratory  Respiratory  Respiratory (WDL): All respiratory elements are within defined limits  Cardiovascular  Cardiovascular  Cardiovascular (WDL): All cardiovascular elements are within defined limits  Gastrointestinal  Gastrointestinal  Gastrointestinal (WDL): All gastrointestinal elements are within defined limits  Musculoskeletal  Musculoskeletal and Connective Tissue Disorders  Musculoskeletal & Connective (WDL): All musculoskeletal & connective elements are within defined limits  Integumentary  Integumentary  Integumentary (WDL): All integumentary elements are within defined limits  Neurological  Neurosensory  Neurosensory (WDL): All neurosensory elements are within defined limits  Genitourinary/Reproductive  Genitourinary  Genitourinary (WDL): All genitourinary elements are within defined limits  Lymphatic  Lymph System Disorders  Lymph (WDL): All lymph elements are within defined limits  Pain  Pain Score: Moderate Pain (5)  AUA Assessment                                                              Accompanied by  Accompanied  By: self only    ECOG Status: 1 - Can't do physically strenuous work, but fully ambyulatory and can do light sedentary work    KPS Score: 80% Can perform normal activity with effort, some signs of disease    Pain Management Plan: Advised the patient she would be a candidate for more potent narcotic use with her metastatic breast cancer.  Patient declined    Recent Labs:   Recent Results (from the past week)   CEA   Result Value Ref Range    CEA 1.2 ng/mL   COMPREHENSIVE METABOLIC PANEL   Result Value Ref Range    Sodium 143 135 - 145 mmol/L    Potassium 4.3 3.4 - 5.3 mmol/L    Carbon Dioxide (CO2) 28 22 - 29 mmol/L    Anion Gap 9 7 - 15 mmol/L    Urea Nitrogen 13.5 8.0 - 23.0 mg/dL    Creatinine 0.86 0.51 - 0.95 mg/dL    GFR Estimate 73 >60 mL/min/1.73m2    Calcium 9.3 8.8 - 10.4 mg/dL    Chloride 106 98 - 107 mmol/L    Glucose 91 70 - 99 mg/dL    Alkaline Phosphatase 59 40 - 150 U/L    AST 41 0 - 45 U/L    ALT 49 0 - 50 U/L    Protein Total 7.7 6.4 - 8.3 g/dL    Albumin 4.1 3.5 - 5.2 g/dL    Bilirubin Total 0.2 <=1.2 mg/dL   Other Laboratory; [facility]; [facility] (Laboratory Miscellaneous Order)   Result Value Ref Range    Specimen Status       Specimen received. Reordered and sent to performing laboratory. Report to follow upon completion.    Performing Laboratory [facility]     Test Name [facility] Madrigal     Test Code N/A    CA27.29  BREAST TUMOR MARKER   Result Value Ref Range    CA 27.29 14.9 <=39.0 U/mL   CBC with platelets and differential   Result Value Ref Range    WBC Count 8.6 4.0 - 11.0 10e3/uL    RBC Count 4.35 3.80 - 5.20 10e6/uL    Hemoglobin 12.6 11.7 - 15.7 g/dL    Hematocrit 40.5 35.0 - 47.0 %    MCV 93 78 - 100 fL    MCH 29.0 26.5 - 33.0 pg    MCHC 31.1 (L) 31.5 - 36.5 g/dL    RDW 13.2 10.0 - 15.0 %    Platelet Count 189 150 - 450 10e3/uL    % Neutrophils 52 %    % Lymphocytes 38 %    % Monocytes 7 %    % Eosinophils 3 %    % Basophils 0 %    % Immature Granulocytes 0 %    NRBCs per 100 WBC 0 <1 /100     Absolute Neutrophils 4.4 1.6 - 8.3 10e3/uL    Absolute Lymphocytes 3.3 0.8 - 5.3 10e3/uL    Absolute Monocytes 0.6 0.0 - 1.3 10e3/uL    Absolute Eosinophils 0.3 0.0 - 0.7 10e3/uL    Absolute Basophils 0.0 0.0 - 0.2 10e3/uL    Absolute Immature Granulocytes 0.0 <=0.4 10e3/uL    Absolute NRBCs 0.0 10e3/uL       Imaging: Imaging results 6 weeks:PET Oncology Whole Body    Result Date: 2/27/2025  Combined Report of: PET and CT on  2/25/2025 11:40 AM: FOLLOW-UP APPOINTMENT: 2/27/2025 1. PET of the neck, chest, abdomen, and pelvis. 2. PET CT Fusion for Attenuation Correction and Anatomical Localization:  3. 3D MIP and PET-CT fused images were processed on an independent workstation and archived to PACS and reviewed by a radiologist. Technique: 1. PET: The patient received 10.78 mCi of F-18-FDG; the serum glucose was 104 mg/dL prior to administration, body weight was 81.9 kg. Images were evaluated in the axial, sagittal, and coronal planes as well as the rotational whole body MIP. Images were acquired from the Vertex to the Feet. UPTAKE WAS MEASURED AT 60 MINUTES. 2. CT: CT only obtained for attenuation correction and not diagnostic purposes. INDICATION: Carcinoma of upper-outer quadrant of left breast in female, estrogen receptor positive (H); Carcinoma of upper-outer quadrant of left breast in female, estrogen receptor positive (H) ADDITIONAL INFORMATION OBTAINED FROM EMR: 70-year-old female diagnosed in 2014 with a left breast cancer. Clinical staging of T4b N2 M1. ER was positive in 100% of the cells, CT at 14% and HER2 was 3+ positive.  There were skeletal metastases in the thoracic vertebrae at 12, L1, L3, L5 and S1 vertebral body. Ischial and right femur were also involved, as well as a large iliac mass. Status post radiation in 1 dose of 6 Gy to the right iliac lesion. Patient currently on letrozole and trastuzuamb.  Denosumab has now been held for new diagnosis of osteonecrosis of the R maxilla. COMPARISON: CT  1/6/2025, PET CT 12/21/2017 FINDINGS: BACKGROUND: Liver SUV max = 3.40, Aorta Blood SUV max = 3.38. HEAD/NECK: Few mildly avid nonenlarged nodes are similar to prior: Right level 2 lymph node measures 7 x 12 mm (4/71) with a maximum SUV of 3.70. Right parotid lymph node measuring 10 x 5 mm (4/75) with a max SUV of 2.86. Similar-appearing segmental sclerosis in the right hemimandible medullary cavity. CHEST: No abnormal uptake. Postprocedural changes of left mastectomy. Slightly decreased size of 5mm inferior anterior right middle lobe (4/154) nodule, and unchanged 5 mm nodule at the left lung base (4/154). These nodules do not demonstrate increased uptake (they are below PET threshold) . ABDOMEN AND PELVIS: No abnormal uptake. Myomatous uterus. Hepatic steatosis. Cholecystectomy. LOWER EXTREMITIES: No abnormal uptake. BONES AND SOFT TISSUES: No abnormal uptake. There are innumerable sclerotic lesions present throughout the ribs, vertebral bodies, right femur, and pelvis. These demonstrate heterogenous appearing uptake which is now below background. Resolution of previously seen residual uptake by the right iliac bone particularly acetabulum. iliac bone. No suspicious uptake.     IMPRESSION: In this patient with history of metastatic left breast cancer: 1. No metabolically active disease. Stable appearance of the sclerotic lesions throughout the axial and appendicular skeleton. 2. Stable couple of 5 mm pulmonary nodules. 3. Mildly avid nonenlarged right cervical lymph nodes are likely reactive. I have personally reviewed the examination and initial interpretation and I agree with the findings. DARLING ANNA MD   SYSTEM ID:  [id number]    MR Lumbar Spine w/o & w Contrast    Result Date: 2/10/2025  EXAM: MR LUMBAR SPINE W/O & W CONTRAST  2/10/2025 5:53 PM HISTORY:  severe acute onset midline back pain, known spinal metastasis; Low back pain; r/o Cancer; No known/automatically detected potential contraindications to  imaging   COMPARISON:  CT CAP 1/6/2025 TECHNIQUE: Sagittal T1-weighted and T2-weighted and axial T2-weighted images of the lumbar spine were obtained without intravenous contrast. Post intravenous contrast using gadolinium axial and sagittal T1-weighted images were obtained with fat saturation. CONTRAST: gadobutrol (GADAVIST) injection 8 mL. FINDINGS: There are 5 lumbar type vertebrae, used for the purposes of this dictation. Conus tip at L1. Normal lumbar vertebral alignment. Heterogeneous marrow signal of the lumbar spine and visualized portions of the sacrum/pelvis, compatible with known diffuse metastatic disease. Most conspicuous lesions in the L1 and L3 vertebral bodies, as well as the right iliac bone. Mild disc space narrowing with loss of the intrinsic T2 signal at T12-L1 and L1-L2. Normal cauda equina. On a level by level basis, the findings are as follows: L1-2: Circumferential disc osteophyte complex. Mild bilateral neuroforaminal stenosis. Mild spinal canal stenosis. L2-3: Circumferential disc bulge. Bilateral facet arthropathy. Mild bilateral neural foraminal stenosis. No spinal canal stenosis. L3-4: Bilateral facet arthropathy. Mild left neural foraminal stenosis. Tiny incidental left perineural cyst. Osseous and periarticular edema of the left facet joint. L4-5: Bilateral facet arthropathy, right greater than left, and ligamentum flavum hypertrophy. Mild bilateral neuroforaminal stenosis. No spinal canal stenosis. Osseous and periarticular edema of the right facet joint. L5-S1: Circumferential disc bulge with annular fissuring in the central zone. Bilateral facet arthropathy, left greater than right. No spinal canal or neural foraminal stenosis. The visualized paraspinous soft tissues are within normal limits.     IMPRESSION: 1. No acute fracture of the lumbar spine. 2. Multifocal metastases of the lumbar spine and visualized portions of the sacrum/pelvis, similar to the CT 1/6/2025. 3. Multilevel  lumbar spondylosis. No high-grade neuroforaminal or spinal canal stenosis. 4. Active inflammatory arthropathy of the left L3-4 and right L4-5 facet joints. I have personally reviewed the examination and initial interpretation and I agree with the findings. REBECCA OLMSTEAD MD   SYSTEM ID:  [id number]     Pathology:   No results found. However, due to the size of the patient record, not all encounters were searched. Please check Results Review for a complete set of results.              Objective:          PHYSICAL EXAMINATION:    BP (!) 152/79 (BP Location: Right arm, Patient Position: Chair, Cuff Size: Adult Regular)   Pulse 81   Temp 97.9  F (36.6  C) (Oral)   Resp 18   SpO2 96%     Gen: Alert, in NAD  Neurologic: A/Ox3, CN II-XII intact, normal gait and station.  Psychiatric: Appropriate mood and affect    Intent of Therapy: Palliative  Side effects that may occur during or within weeks after Radiation Therapy    Fatigue and general weakness  Irritation or soreness of the irradiated area  Loss of hair on the irradiated area      Side effects that may occur months or years after Radiation Therapy    Development of another tumor or cancer  Weakening of the bone          Osteoradionecrosis  Bone fracture  Poor healing after a trauma or surgery in the irradiated area  Nerve damage resulting in loss of strength and sensation    The risks, benefits and alternatives to radiation therapy were outlined with the patient. All questions were answered and a consent was signed.     Impression     Visit Dx:  (C79.51) Bone metastasis  (primary encounter diagnosis)    (C50.412,  Z17.0) Carcinoma of upper-outer quadrant of left breast in female, estrogen receptor positive (H)      Cancer Staging   No matching staging information was found for the patient.      Assessment & Plan:   1.  Bone metastasis from breast cancer as noted above.  PET scan shows a complete response without any active disease at this time particularly that of  the bones.  MRI shows the prior metastasis although no significant change since CT on 1/6/2025.  No acute fracture is identified.  MRI with multilevel lumbar spondylosis active inflammatory arthropathy of the left L3-L4 right L4-L5 facet joints.  The cause of her pain may be multifactorial from inflammatory to micro fractures given the instability of the pelvis and negative PET scan..  Neurosurgery to evaluate.    2.  Patient is neurologically intact.  Discussed the use of radiation therapy in the setting of PET negative nonactive disease.  Given her long history of bone metastasis, radiation would be difficult to isolate to improve her pain given her inactive disease.  Some of her pain may be contributing from the inflammatory component seen at the left L3-L4 and right L4-L5 facet joints.  The patient will see neurosurgery for evaluation and particularly possibly steroids for these areas.  Hopefully this may relieve her pain.    3.  Reviewed the risk benefits alternatives long and short term sequela of radiation therapy.  Not sure that we could reduce her pain given the inactivity of any locally advanced disease on PET scan.  Would recommend repeating the PET scan in approximately 8 to 12 weeks and make recommendations accordingly.  If we can find some active disease or progression of disease radiation would certainly benefit the patient at that point with high likelihood of improvement of her symptoms.  Otherwise I do believe conservative management including pain management injection and possibly kyphoplasty to help relieve the patient's pain and symptoms if warranted by neurosurgery.    4. The above was discussed with the patient and the imaging findings were reviewed.  All the patient's questions were answered.  Patient was comfortable with the plan of care.  We will plan to see the patient in 8 to 12-week follow-up clinic or sooner if requested.  I do believe she has imaging scheduled for May which we can  review and make recommendations accordingly.      Again, thank you very much for the referral and allowing me to participate in the care of this patient.  If you have any questions or concerns about this consultation, please do not hesitate to call.  I spent approximately 45 minutes today with the patient and 80% time was used for counseling.      Sincerely,      DO CAILIN Brumfield MBA-HCM.   Department of Radiation Oncology   [facility] Radiation Oncology  Tel: [phone]  Page: [phone]    [facility]: [place] [address] [place]    CC:  Patient Care Team:  Laury Dee MD as PCP - General (Internal Medicine)  Lisandro Aguiar MD as MD (Oncology)  Laury Dee MD as Assigned PCP  Laury Dee MD as Referring Physician (Internal Medicine)  Gwen Walters APRN CNP as Nurse Practitioner (Dermatology)  Lakesha Ventura RN as Lead Care Coordinator (Primary Care - CC)  Rachel Whipple DPM, Podiatry/Foot and Ankle Surgery as Assigned Musculoskeletal Provider  Kassidy Robles APRN CNP as Assigned Cancer Care Provider  Chiquis Gomez PA-C as Physician Assistant (Dermatology)  Chiquis Gomez PA-C as Assigned Surgical Provider  Gwen Kim NP as Nurse Practitioner (Pulmonary Disease)  Gwen Kim NP as Assigned Pulmonology Provider  Holley Armijo RN as Specialty Care Coordinator (Hematology & Oncology)  Codey Burr MD as MD (Radiation Oncology)      Again, thank you for allowing me to participate in the care of your patient.        Sincerely,        Codey Burr MD    Electronically signed

## 2025-03-04 NOTE — PROGRESS NOTES
[facility] Radiation Oncology Consult Note     Patient: Hoda Brush  MRN: [id number]  Date of Service: 03/04/2025          Lisandro Aguiar MD  [address] [facility] [address]  [place] [address]       Dear Dr. Aguiar:    Thank you very much for referring this patient for consideration of radiotherapy. As you know Ms. Brush is a 70 year old female with a diagnosis of metastatic breast cancer since approximately 2014.. She was seen today for consideration of radiation therapy secondary to pain..     Oncological diagnosis: Metastatic breast cancer in 2014 what appears to be a clinical stage of T4b N2 M1.     Chief complaint: Chronic and worsening pain of the lower back and pelvis    Treatment: CAF neoadjuvant chemotherapy to 2/4 the 5/14.  Followed by 4 cycles of Herceptin taxane with monthly zoledronic acid.  Tamoxifen therapy.  This was then changed to letrozole and she also received Denosumab.     Entire visit and physical exam was performed with my nurse Tracy present at all times.    HISTORY OF PRESENT ILLNESS:   Ms. Brush is a 70 year old female who from [facility].  She was diagnosed with metastatic breast cancer in 2014 what appears to be a clinical stage of T4b N2 M1.  ER positive NE positive HER2/kenia 3+.  Multiple skeletal metastasis were identified at that presentation by report.  Specifically T12-L5 and S1.  Right femur was also involved she received a total dose of 6 Gray in 1 fraction to the iliac lesion.    The patient has been treated very successfully with systemic agents.  PET scan from 2/25/2025 shows no active disease or PET activity in the bones consistent with a good clinical response.  MRI on 2/10/2025 reveals multifocal metastasis of the lumbar and visual portion of the sacrum and pelvis similar to 1/6/2025.  Active inflammatory arthropathy of the left L3 L5 and right L4-L5 facet joints.  No acute fracture of the lumbar spine.    The patient reports that the pain  of the lower back and extremity has not acutely change.  Is has been chronic and progressive over a slow period of time.  It is worsening over the last several months.  It is not associated with any bladder and bowel incontinence muscle weakness.  However it is difficult for her to sit.    The patient has an appointment with neurosurgery for evaluation.     We were asked to see if radiation can be of benefit in her complete response to systemic therapy.    CHEMOTHERAPY HISTORY: Concurrent Chemotherapy: No    RADIATION THERAPY HISTORY: Prior Radiation: Yes    IMPLANTED CARDIAC DEVICE: none     PREGNANCY: N/A    Current Outpatient Medications   Medication Sig Dispense Refill    acetaminophen (TYLENOL) 500 MG tablet Take 1,000 mg by mouth every 6 hours as needed for mild pain      calcium carbonate (OS-TAYLER) 500 MG tablet Take 1 tablet by mouth daily      fluticasone (FLONASE) 50 MCG/ACT nasal spray Spray 2 sprays into both nostrils daily 16 g 3    letrozole (FEMARA) 2.5 MG tablet Take 1 tablet (2.5 mg) by mouth daily 90 tablet 3    order for DME Equipment being ordered: 2 Mastectomy bras and 1 prosthetheses. 2 Piece 0    pantoprazole (PROTONIX) 40 MG EC tablet Take 1 tablet (40 mg) by mouth daily. 90 tablet 3    VITAMIN D, CHOLECALCIFEROL, PO Take 1,000 Units by mouth daily      mometasone-formoterol (DULERA) 100-5 MCG/ACT inhaler Inhale 2 puffs into the lungs 2 times daily. (Patient not taking: Reported on 3/4/2025) 13 g 4    predniSONE (DELTASONE) 20 MG tablet Take two tablets (= 40mg) each day for 5 (five) days (Patient not taking: Reported on 2/24/2025) 10 tablet 0    traZODone (DESYREL) 50 MG tablet TAKE 1/2 TABLET(25 MG) BY MOUTH EVERY NIGHT AS NEEDED FOR SLEEP (Patient not taking: Reported on 1/30/2025) 45 tablet 2     Past Medical History:   Diagnosis Date    Breast cancer metastasized to bone (H)     Lymphedema of left upper extremity     Personal history of chemotherapy     S/P radiation therapy     6 Gy to  right iliac region in 11/2014 - [facility]     Past Surgical History:   Procedure Laterality Date    APPENDECTOMY      BIOPSY  2014    COLONOSCOPY N/A 2/1/2018    Procedure: COLONOSCOPY;  Colonoscopy;  Surgeon: Phillip Rowe MD;  Location: [facility]    ESOPHAGOSCOPY, GASTROSCOPY, DUODENOSCOPY (EGD), COMBINED N/A 2/16/2018    Procedure: COMBINED ESOPHAGOSCOPY, GASTROSCOPY, DUODENOSCOPY (EGD), BIOPSY SINGLE OR MULTIPLE;;  Surgeon: Paty Herman MD;  Location: [facility]    ESOPHAGOSCOPY, GASTROSCOPY, DUODENOSCOPY (EGD), COMBINED N/A 2/5/2020    Procedure: ESOPHAGOGASTRODUODENOSCOPY (EGD);  Surgeon: Anthony Alonso MD;  Location: [facility]    INSERT PORT VASCULAR ACCESS Right 9/15/2017    Procedure: INSERT PORT VASCULAR ACCESS;  Central venous chest port placement, right;  Surgeon: Dmitriy Hernandez PA-C;  Location: [facility]    LAPAROSCOPIC CHOLECYSTECTOMY N/A 8/31/2019    Procedure: LAPAROSCOPIC CHOLECYSTECTOMY;  Surgeon: Maldonado Billy MD;  Location: [facility]    MASTECTOMY Left 06/27/2014    and lymph node resection    MASTECTOMY SIMPLE BILATERAL      Mastectomy 06/27/2014     Allergies   Allergen Reactions    Zometa [Zoledronic Acid] Swelling     Family History   Problem Relation Age of Onset    Breast Cancer Mother 45    Lung Cancer Father         smoker    Breast Cancer Sister 61     Social History     Socioeconomic History    Marital status:      Spouse name: Not on file    Number of children: Not on file    Years of education: Not on file    Highest education level: Not on file   Occupational History    Not on file   Tobacco Use    Smoking status: Never     Passive exposure: Never    Smokeless tobacco: Never   Vaping Use    Vaping status: Never Used   Substance and Sexual Activity    Alcohol use: No    Drug use: No    Sexual activity: Not Currently     Partners: Male   Other Topics Concern    Parent/sibling w/ CABG, MI or angioplasty before 65F 55M? No   Social History Narrative     Not on file     Social Drivers of Health     Financial Resource Strain: Low Risk  (5/13/2024)    Financial Resource Strain     Within the past 12 months, have you or your family members you live with been unable to get utilities (heat, electricity) when it was really needed?: No   Food Insecurity: Low Risk  (5/13/2024)    Food Insecurity     Within the past 12 months, did you worry that your food would run out before you got money to buy more?: No     Within the past 12 months, did the food you bought just not last and you didn t have money to get more?: No   Transportation Needs: Low Risk  (5/13/2024)    Transportation Needs     Within the past 12 months, has lack of transportation kept you from medical appointments, getting your medicines, non-medical meetings or appointments, work, or from getting things that you need?: No   Physical Activity: Unknown (5/13/2024)    Exercise Vital Sign     Days of Exercise per Week: Patient declined     Minutes of Exercise per Session: 30 min   Stress: No Stress Concern Present (5/13/2024)    [employer] - Occupational Stress Questionnaire     Feeling of Stress : Not at all   Social Connections: Unknown (5/13/2024)    Social Connection and Isolation Panel [NHANES]     Frequency of Communication with Friends and Family: Not on file     Frequency of Social Gatherings with Friends and Family: Twice a week     Attends Gnosticism Services: Not on file     Active Member of Clubs or Organizations: Not on file     Attends Club or Organization Meetings: Not on file     Marital Status: Not on file   Interpersonal Safety: Low Risk  (11/12/2024)    Interpersonal Safety     Do you feel physically and emotionally safe where you currently live?: Yes     Within the past 12 months, have you been hit, slapped, kicked or otherwise physically hurt by someone?: No     Within the past 12 months, have you been humiliated or emotionally abused in other ways by your partner or  ex-partner?: No   Housing Stability: Low Risk  (5/13/2024)    Housing Stability     Do you have housing? : Yes     Are you worried about losing your housing?: No        REVIEW OF SYMPTOMS:  A full 14-point review of systems was performed. Pertinent findings are noted in the HPI.    General  Constitutional  Constitutional (WDL): All constitutional elements are within defined limits  EENT  Eye Disorders  Eye Disorder (WDL): All eye disorder elements are within defined limits  Ear Disorders  Ear Disorder (WDL): All ear disorder elements are within defined limits  Respiratory  Respiratory  Respiratory (WDL): All respiratory elements are within defined limits  Cardiovascular  Cardiovascular  Cardiovascular (WDL): All cardiovascular elements are within defined limits  Gastrointestinal  Gastrointestinal  Gastrointestinal (WDL): All gastrointestinal elements are within defined limits  Musculoskeletal  Musculoskeletal and Connective Tissue Disorders  Musculoskeletal & Connective (WDL): All musculoskeletal & connective elements are within defined limits  Integumentary  Integumentary  Integumentary (WDL): All integumentary elements are within defined limits  Neurological  Neurosensory  Neurosensory (WDL): All neurosensory elements are within defined limits  Genitourinary/Reproductive  Genitourinary  Genitourinary (WDL): All genitourinary elements are within defined limits  Lymphatic  Lymph System Disorders  Lymph (WDL): All lymph elements are within defined limits  Pain  Pain Score: Moderate Pain (5)  AUA Assessment                                                              Accompanied by  Accompanied By: self only    ECOG Status: 1 - Can't do physically strenuous work, but fully ambyulatory and can do light sedentary work    KPS Score: 80% Can perform normal activity with effort, some signs of disease    Pain Management Plan: Advised the patient she would be a candidate for more potent narcotic use with her metastatic  breast cancer.  Patient declined    Recent Labs:   Recent Results (from the past week)   CEA   Result Value Ref Range    CEA 1.2 ng/mL   COMPREHENSIVE METABOLIC PANEL   Result Value Ref Range    Sodium 143 135 - 145 mmol/L    Potassium 4.3 3.4 - 5.3 mmol/L    Carbon Dioxide (CO2) 28 22 - 29 mmol/L    Anion Gap 9 7 - 15 mmol/L    Urea Nitrogen 13.5 8.0 - 23.0 mg/dL    Creatinine 0.86 0.51 - 0.95 mg/dL    GFR Estimate 73 >60 mL/min/1.73m2    Calcium 9.3 8.8 - 10.4 mg/dL    Chloride 106 98 - 107 mmol/L    Glucose 91 70 - 99 mg/dL    Alkaline Phosphatase 59 40 - 150 U/L    AST 41 0 - 45 U/L    ALT 49 0 - 50 U/L    Protein Total 7.7 6.4 - 8.3 g/dL    Albumin 4.1 3.5 - 5.2 g/dL    Bilirubin Total 0.2 <=1.2 mg/dL   Other Laboratory; [facility]; [facility] (Laboratory Miscellaneous Order)   Result Value Ref Range    Specimen Status       Specimen received. Reordered and sent to performing laboratory. Report to follow upon completion.    Performing Laboratory [facility]     Test Name [facility] Madrigal     Test Code N/A    CA27.29  BREAST TUMOR MARKER   Result Value Ref Range    CA 27.29 14.9 <=39.0 U/mL   CBC with platelets and differential   Result Value Ref Range    WBC Count 8.6 4.0 - 11.0 10e3/uL    RBC Count 4.35 3.80 - 5.20 10e6/uL    Hemoglobin 12.6 11.7 - 15.7 g/dL    Hematocrit 40.5 35.0 - 47.0 %    MCV 93 78 - 100 fL    MCH 29.0 26.5 - 33.0 pg    MCHC 31.1 (L) 31.5 - 36.5 g/dL    RDW 13.2 10.0 - 15.0 %    Platelet Count 189 150 - 450 10e3/uL    % Neutrophils 52 %    % Lymphocytes 38 %    % Monocytes 7 %    % Eosinophils 3 %    % Basophils 0 %    % Immature Granulocytes 0 %    NRBCs per 100 WBC 0 <1 /100    Absolute Neutrophils 4.4 1.6 - 8.3 10e3/uL    Absolute Lymphocytes 3.3 0.8 - 5.3 10e3/uL    Absolute Monocytes 0.6 0.0 - 1.3 10e3/uL    Absolute Eosinophils 0.3 0.0 - 0.7 10e3/uL    Absolute Basophils 0.0 0.0 - 0.2 10e3/uL    Absolute Immature Granulocytes 0.0 <=0.4 10e3/uL    Absolute NRBCs 0.0 10e3/uL       Imaging: Imaging  results 6 weeks:PET Oncology Whole Body    Result Date: 2/27/2025  Combined Report of: PET and CT on  2/25/2025 11:40 AM: FOLLOW-UP APPOINTMENT: 2/27/2025 1. PET of the neck, chest, abdomen, and pelvis. 2. PET CT Fusion for Attenuation Correction and Anatomical Localization:  3. 3D MIP and PET-CT fused images were processed on an independent workstation and archived to PACS and reviewed by a radiologist. Technique: 1. PET: The patient received 10.78 mCi of F-18-FDG; the serum glucose was 104 mg/dL prior to administration, body weight was 81.9 kg. Images were evaluated in the axial, sagittal, and coronal planes as well as the rotational whole body MIP. Images were acquired from the Vertex to the Feet. UPTAKE WAS MEASURED AT 60 MINUTES. 2. CT: CT only obtained for attenuation correction and not diagnostic purposes. INDICATION: Carcinoma of upper-outer quadrant of left breast in female, estrogen receptor positive (H); Carcinoma of upper-outer quadrant of left breast in female, estrogen receptor positive (H) ADDITIONAL INFORMATION OBTAINED FROM EMR: 70-year-old female diagnosed in 2014 with a left breast cancer. Clinical staging of T4b N2 M1. ER was positive in 100% of the cells, OR at 14% and HER2 was 3+ positive.  There were skeletal metastases in the thoracic vertebrae at 12, L1, L3, L5 and S1 vertebral body. Ischial and right femur were also involved, as well as a large iliac mass. Status post radiation in 1 dose of 6 Gy to the right iliac lesion. Patient currently on letrozole and trastuzuamb.  Denosumab has now been held for new diagnosis of osteonecrosis of the R maxilla. COMPARISON: CT 1/6/2025, PET CT 12/21/2017 FINDINGS: BACKGROUND: Liver SUV max = 3.40, Aorta Blood SUV max = 3.38. HEAD/NECK: Few mildly avid nonenlarged nodes are similar to prior: Right level 2 lymph node measures 7 x 12 mm (4/71) with a maximum SUV of 3.70. Right parotid lymph node measuring 10 x 5 mm (4/75) with a max SUV of 2.86.  Similar-appearing segmental sclerosis in the right hemimandible medullary cavity. CHEST: No abnormal uptake. Postprocedural changes of left mastectomy. Slightly decreased size of 5mm inferior anterior right middle lobe (4/154) nodule, and unchanged 5 mm nodule at the left lung base (4/154). These nodules do not demonstrate increased uptake (they are below PET threshold) . ABDOMEN AND PELVIS: No abnormal uptake. Myomatous uterus. Hepatic steatosis. Cholecystectomy. LOWER EXTREMITIES: No abnormal uptake. BONES AND SOFT TISSUES: No abnormal uptake. There are innumerable sclerotic lesions present throughout the ribs, vertebral bodies, right femur, and pelvis. These demonstrate heterogenous appearing uptake which is now below background. Resolution of previously seen residual uptake by the right iliac bone particularly acetabulum. iliac bone. No suspicious uptake.     IMPRESSION: In this patient with history of metastatic left breast cancer: 1. No metabolically active disease. Stable appearance of the sclerotic lesions throughout the axial and appendicular skeleton. 2. Stable couple of 5 mm pulmonary nodules. 3. Mildly avid nonenlarged right cervical lymph nodes are likely reactive. I have personally reviewed the examination and initial interpretation and I agree with the findings. DARLING ANNA MD   SYSTEM ID:  [id number]    MR Lumbar Spine w/o & w Contrast    Result Date: 2/10/2025  EXAM: MR LUMBAR SPINE W/O & W CONTRAST  2/10/2025 5:53 PM HISTORY:  severe acute onset midline back pain, known spinal metastasis; Low back pain; r/o Cancer; No known/automatically detected potential contraindications to imaging   COMPARISON:  CT CAP 1/6/2025 TECHNIQUE: Sagittal T1-weighted and T2-weighted and axial T2-weighted images of the lumbar spine were obtained without intravenous contrast. Post intravenous contrast using gadolinium axial and sagittal T1-weighted images were obtained with fat saturation. CONTRAST: gadobutrol (GADAVIST)  injection 8 mL. FINDINGS: There are 5 lumbar type vertebrae, used for the purposes of this dictation. Conus tip at L1. Normal lumbar vertebral alignment. Heterogeneous marrow signal of the lumbar spine and visualized portions of the sacrum/pelvis, compatible with known diffuse metastatic disease. Most conspicuous lesions in the L1 and L3 vertebral bodies, as well as the right iliac bone. Mild disc space narrowing with loss of the intrinsic T2 signal at T12-L1 and L1-L2. Normal cauda equina. On a level by level basis, the findings are as follows: L1-2: Circumferential disc osteophyte complex. Mild bilateral neuroforaminal stenosis. Mild spinal canal stenosis. L2-3: Circumferential disc bulge. Bilateral facet arthropathy. Mild bilateral neural foraminal stenosis. No spinal canal stenosis. L3-4: Bilateral facet arthropathy. Mild left neural foraminal stenosis. Tiny incidental left perineural cyst. Osseous and periarticular edema of the left facet joint. L4-5: Bilateral facet arthropathy, right greater than left, and ligamentum flavum hypertrophy. Mild bilateral neuroforaminal stenosis. No spinal canal stenosis. Osseous and periarticular edema of the right facet joint. L5-S1: Circumferential disc bulge with annular fissuring in the central zone. Bilateral facet arthropathy, left greater than right. No spinal canal or neural foraminal stenosis. The visualized paraspinous soft tissues are within normal limits.     IMPRESSION: 1. No acute fracture of the lumbar spine. 2. Multifocal metastases of the lumbar spine and visualized portions of the sacrum/pelvis, similar to the CT 1/6/2025. 3. Multilevel lumbar spondylosis. No high-grade neuroforaminal or spinal canal stenosis. 4. Active inflammatory arthropathy of the left L3-4 and right L4-5 facet joints. I have personally reviewed the examination and initial interpretation and I agree with the findings. REBECCA OLMSTEAD MD   SYSTEM ID:  [id number]     Pathology:   No results  found. However, due to the size of the patient record, not all encounters were searched. Please check Results Review for a complete set of results.              Objective:          PHYSICAL EXAMINATION:    BP (!) 152/79 (BP Location: Right arm, Patient Position: Chair, Cuff Size: Adult Regular)   Pulse 81   Temp 97.9  F (36.6  C) (Oral)   Resp 18   SpO2 96%     Gen: Alert, in NAD  Neurologic: A/Ox3, CN II-XII intact, normal gait and station.  Psychiatric: Appropriate mood and affect    Intent of Therapy: Palliative  Side effects that may occur during or within weeks after Radiation Therapy    Fatigue and general weakness  Irritation or soreness of the irradiated area  Loss of hair on the irradiated area      Side effects that may occur months or years after Radiation Therapy    Development of another tumor or cancer  Weakening of the bone          Osteoradionecrosis  Bone fracture  Poor healing after a trauma or surgery in the irradiated area  Nerve damage resulting in loss of strength and sensation    The risks, benefits and alternatives to radiation therapy were outlined with the patient. All questions were answered and a consent was signed.     Impression     Visit Dx:  (C79.51) Bone metastasis  (primary encounter diagnosis)    (C50.412,  Z17.0) Carcinoma of upper-outer quadrant of left breast in female, estrogen receptor positive (H)      Cancer Staging   No matching staging information was found for the patient.      Assessment & Plan:   1.  Bone metastasis from breast cancer as noted above.  PET scan shows a complete response without any active disease at this time particularly that of the bones.  MRI shows the prior metastasis although no significant change since CT on 1/6/2025.  No acute fracture is identified.  MRI with multilevel lumbar spondylosis active inflammatory arthropathy of the left L3-L4 right L4-L5 facet joints.  The cause of her pain may be multifactorial from inflammatory to micro  fractures given the instability of the pelvis and negative PET scan..  Neurosurgery to evaluate.    2.  Patient is neurologically intact.  Discussed the use of radiation therapy in the setting of PET negative nonactive disease.  Given her long history of bone metastasis, radiation would be difficult to isolate to improve her pain given her inactive disease.  Some of her pain may be contributing from the inflammatory component seen at the left L3-L4 and right L4-L5 facet joints.  The patient will see neurosurgery for evaluation and particularly possibly steroids for these areas.  Hopefully this may relieve her pain.    3.  Reviewed the risk benefits alternatives long and short term sequela of radiation therapy.  Not sure that we could reduce her pain given the inactivity of any locally advanced disease on PET scan.  Would recommend repeating the PET scan in approximately 8 to 12 weeks and make recommendations accordingly.  If we can find some active disease or progression of disease radiation would certainly benefit the patient at that point with high likelihood of improvement of her symptoms.  Otherwise I do believe conservative management including pain management injection and possibly kyphoplasty to help relieve the patient's pain and symptoms if warranted by neurosurgery.    4. The above was discussed with the patient and the imaging findings were reviewed.  All the patient's questions were answered.  Patient was comfortable with the plan of care.  We will plan to see the patient in 8 to 12-week follow-up clinic or sooner if requested.  I do believe she has imaging scheduled for May which we can review and make recommendations accordingly.      Again, thank you very much for the referral and allowing me to participate in the care of this patient.  If you have any questions or concerns about this consultation, please do not hesitate to call.  I spent approximately 45 minutes today with the patient and 80% time  was used for counseling.      Sincerely,      DO CAILIN Brumfield MBA-[facility].   Department of Radiation Oncology   [facility] Radiation Oncology  Tel: [phone]  Page: [phone]    [facility]: [place]    CC:  Patient Care Team:  Laury Dee MD as PCP - General (Internal Medicine)  Lisandro Aguiar MD as MD (Oncology)  Laury Dee MD as Assigned PCP  Laury Dee MD as Referring Physician (Internal Medicine)  Gwen Walters APRN CNP as Nurse Practitioner (Dermatology)  Lakesha Ventura RN as Lead Care Coordinator (Primary Care - CC)  Rachel Whipple DPM, Podiatry/Foot and Ankle Surgery as Assigned Musculoskeletal Provider  Kassidy Robles APRN CNP as Assigned Cancer Care Provider  Chiquis Gomez PA-C as Physician Assistant (Dermatology)  Chiquis Gomez PA-C as Assigned Surgical Provider  Gwen Kim NP as Nurse Practitioner (Pulmonary Disease)  Gwen Kim NP as Assigned Pulmonology Provider  Holley Armijo RN as Specialty Care Coordinator (Hematology & Oncology)  Codey Burr MD as MD (Radiation Oncology)

## 2025-03-04 NOTE — PATIENT INSTRUCTIONS
Please contact [facility] RN with questions or concerns following today's appointment.    If you are a patient of Dr. Burr call: [phone]   If you are a patient of Dr. Stewart call: [phone]    Please feel free to leave a detailed message if your call is not answered.    If your call is not received before 3:00 PM, it may not be returned until the following business day.    If you are receiving radiation treatment and need assistance after 3:00 PM or on the weekends, please call [phone] and ask to speak to the radiation oncologist on-call.    Thank you!    [facility] Radiation Oncology Nursing

## 2025-03-04 NOTE — PROGRESS NOTES
[facility] PHYSICIANS  SPECIALIZING IN BREAKTHROUGHS  Radiation Oncology    On Treatment Visit Note      Hoda Brush      Date: Mar 4, 2025   MRN: [id number]   : 1955           ID:    Reason for Visit:  On Radiation Treatment Visit       Treatment Summary to Date      cGy               Subjective:   No complaints, tolerating RT well overall.    Nursing ROS:                                  Objective:   BP (!) 152/79 (BP Location: Right arm, Patient Position: Chair, Cuff Size: Adult Regular)   Pulse 81   Temp 97.9  F (36.6  C) (Oral)   Resp 18   SpO2 96%   Gen: Appears well, in no acute distress  Skin: {erythema:600126}  CV/Resp: rrr, breathing comfortably on room air  Neuro: CN 2-12 grossly intact, UE/LE full strength     Labs:  CBC RESULTS:   Recent Labs   Lab Test 25  1334   WBC 8.6   RBC 4.35   HGB 12.6   HCT 40.5   MCV 93   MCH 29.0   MCHC 31.1*   RDW 13.2        ELECTROLYTES:  Recent Labs   Lab Test 25  1334      POTASSIUM 4.3   CHLORIDE 106   TAYLER 9.3   CO2 28   BUN 13.5   CR 0.86   GLC 91       Assessment:    Tolerating radiation therapy well.  All questions and concerns addressed.      Plan:   Continue current therapy.        [employer] chart and setup information reviewed  Ports checked and MVCT/IGRT images checked                Codey Burr MD  Radiation Oncology   [facility]  Clinic: [phone]

## 2025-03-04 NOTE — NURSING NOTE
"Oncology Rooming Note    March 4, 2025 10:21 AM   Hoda Brush is a 70 year old female who presents for:    Chief Complaint   Patient presents with    Cancer     Radiation oncology consultation with Dr. Codey Burr     Initial Vitals: BP (!) 152/79 (BP Location: Right arm, Patient Position: Chair, Cuff Size: Adult Regular)   Pulse 81   Temp 97.9  F (36.6  C) (Oral)   Resp 18   SpO2 96%  Estimated body mass index is 31.68 kg/m  as calculated from the following:    Height as of 1/30/25: 1.605 m (5' 3.19\").    Weight as of 2/27/25: 81.6 kg (179 lb 14.4 oz). There is no height or weight on file to calculate BSA.  Moderate Pain (5) Comment: Data Unavailable   No LMP recorded. Patient is postmenopausal.  Allergies reviewed: Yes  Medications reviewed: Yes    Medications: Medication refills not needed today.  Pharmacy name entered into [employer]: [facility] #[id number] - [place], [place] - [address]    Frailty Screening:   Is the patient here for a new oncology consult visit in cancer care? 2. No    PHQ9:  Did this patient require a PHQ9?: No      Clinical concerns: patient presents for radiation oncology new patient consultation with Dr. Burr.  Patient reports she received radiation in [facility] in 2014 to her right low back and hip.  Upon record review, patient received a single treatment of 6 Gy to right iliac region in November of 2014 in [facility].  Patient reports pain \"0/10\" when lying down, however reports constant pain \"5/10\" when sitting or standing.  Patient reports taking Tylenol 1,000 mg po QID with minimal relief.  Patient reports pain across low back at belt line with intermittent radiation of pain to right hip.  Patient denies radiation into buttock or lower extremities, denies loss of bowel or bladder function.    Considerations for radiation treatment   Pregnancy status: Female age 55+ , patient reports menopause at age 46.  Implanted Cardiac Devices: Yes "   Any previous radiation therapy: Yes - patient received 6 Gy to right iliac region in November of 2014 in [facility].         Dr. Codey Burr was notified.    Tracy Zambrano, RN BSN OCN CBCN

## 2025-03-10 LAB
SCANNED LAB RESULT: NORMAL
TEST NAME: NORMAL

## 2025-03-10 NOTE — TELEPHONE ENCOUNTER
DIAGNOSIS: malignant neoplasm mets to bone spine   APPOINTMENT DATE: 3/18/25    NOTES STATUS DETAILS   OFFICE NOTE from referring provider Internal 3/4/25 Codey Burr MD @[facility]- Rad Onc     OFFICE NOTE from other specialist Internal 2/27/25 [facility]     DISCHARGE REPORT from the ER Internal 2/10/25 Damaris Andrade MD @[facility] ED     MEDICATION LIST Internal    DEXA SCAN Internal [facility]  7/14/23 DX Hip/Pelvis/Spine     MRI Internal [facility]  2/25/25 MR Lumbar Spine     XRAYS (IMAGES & REPORTS) Internal [facility]  12/12/24 XR Chest

## 2025-03-10 NOTE — PROGRESS NOTES
ONCOLOGY NOTE  Mar 12, 2025     Hoda Bursh  Female, 69 year old, 1955  MRN: [id number]        Hoda is a 70 year old patient from [facility] and is seen here for continuation of care for her metastatic ER+HER2- breast cancer.  She is a refugee from [facility] and was initially seen in clinic with her daughter.  The only data we have from [facility] is an English translation of her records.       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  On 02/11/2014, she was seen by an oncologist.  The clinical staging of T4b N2 M1 was noted.   Her breast cancer was on the left side. There was no right breast cancer, confirmed by the patient and her daughter, correcting a possible error in the translated records.  ER was positive in 100% of the cells, NJ at 14% and HER2 was 3+ positive.  It appears that this histopathologic information is on the mastectomy specimen. She underwent an MRI for staging of presumptive bone metastases which was performed 03/02/2014.  There were skeletal metastases in the thoracic vertebrae at 12, L1, L3, L5 and S1 vertebral body, ranging in size from 0.7-3.0 cm in size.  Ischial and right femur were also involved, as well as a large iliac mass measuring about 15 cm in the uterus. 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   Radiation Oncology consultation was performed 03/11/2014, and she was given radiation in 1 dose of 6 Gy to the right iliac lesion.        With the initial diagnosis, she initiated treatment with 6 cycles of CAF neoadjuvant chemotherapy 02/14, 07/07, 03/28, 04/18/14, 05/08 and 05/29/14. She also received monthly zoledronic acid.  She then underwent a modified left mastectomy of Palacios type and left lymphadenoectomy on 06/27/2014.   Pathologic examination showed number at [facility] was [id number] showed infiltrative grade 2 ductal cancer with 6 level 1 metastatic lympFollow up with Jossie  for visits and trastuzumab on 2-5, 2-26, 3-19 with CBC, CMP.  Echocardiogram on 3-19.  Follow up with me 4-9 with CBC, CMP, CA27.29 and CEA and with CT CAP on 4-8.h nodes and 3 level 2 metastatic lymph nodes.  The differentiation was intermediate.  The tumor was ER positive 70% of the cells, IN positive in 40% of the cells and HER2 was 3+ by immunohistochemistry and the Ki-67 labeling index was 20%. Her staging after surgery was stage IV, pT4b N2 M1.  I don't see a biopsy of the skeletal metastases.  She then had continuation with chemotherapy with 4 cycles of Herceptin and taxane with monthly zoledronic acid.  Tamoxifen was initiated.  She then had two years of Herceptin and a decision was made not to continue further Herceptin.  She continued on zoledronic acid every 3 months. She was clinically stable. [place] the [place] new [place] from [facility].  She has now been changed to letrozole.  Denosumab has now been held for new diagnosis of osteonecrosis of the R maxilla, resumed 2/2025 after a clear from a dentist.     History of cholecystectomy 2020.      TREATMENT HISTORY:  A. Initial diagnosis with metastatic breast cancer in [facility].  Neoadjuvant CAF x 6.   B. Left mastectomy. Left axillary node dissection.  C.  Radiation in 1 dose to R iliac region.  C. Herceptin for 2 years taxane for a prescribed course then stopped, monthly zoledronic acid.  She had 2 years of Herceptin with tamoxifen added after chemotherapy.  D.  Tamoxifen alone and zoledronic acid every 3 months starting 2014.  Letrozole was started   E.  [place]  We restarted Herceptin every 3 weeks and continued tamoxifen. Bone targeted agent changed to denosumab every 9 weeks. Zometa discontinued 2017.  Tamoxifen was changed to letrozole August 2019.    F.  Xgeva discontinued 12-17-19 because of dental issues.   G.  J+J vaccine March, 2021.  H.  Was on Zometa once May 11.  Reaction with eye lid swelling. Discontinued Zometa.  I.   Restart Xgeva 6-22-21.  Continuing the trastuzumab and letrozole.  Both tolerated well.   J.  Discontinue Xgeva because of osteonecrosis of the maxilla.   Continue trastuzumab and letrozole.   K. Acute back pain. MR L spine 2/10/2025 with stable bone metastases without cord compression and negative PET in bone lesions. L1 with periosteal involvement. Continue trantuzumab/letrozole. Sent liquid biopsy. Clear by a dentist, resumed Xgeva 2/27/2025.      INTERVAL HISTORY  Hoda is seen virtually. Her back is a little bit better now over the past week. She uses tylenol as needed in the afternoons. She can stand and sit for ~40 minutes maximum.  The back pain does not radiated down the legs. Tolerating transtuzumab and letrozole well. No nausea or vomiting. No diarrhea or constipation. Energy is very good. No new pains. No bladder concerns or changes. No skin changes or rashes. Appetite is good. Hydrating well. She restarted Xgeva on 2/27/25.     REVIEW OF SYSTEMS:  A 10-point review of systems is entirely negative.    Video physical exam  General: Patient appears well in no acute distress.   Skin: No visualized rash or lesions on visualized skin  Eyes: EOMI, no erythema, sclera icterus or discharge noted  Resp: Appears to be breathing comfortably without accessory muscle usage, speaking in full sentences, no cough  MSK: Appears to have normal range of motion based on visualized movements  Neurologic: No apparent tremors, facial movements symmetric  Psych: affect bright, alert and oriented     LABORATORY DATA:    Most Recent 3 CBC's:  Recent Labs   Lab Test 02/27/25  1334 02/20/25  1138 02/10/25  1451   WBC 8.6 8.8 8.0   HGB 12.6 13.2 14.0   MCV 93 93 93    234 160   ANEUTAUTO 4.4 4.8 4.3     Most Recent 3 BMP's:  Recent Labs   Lab Test 02/27/25  1334 02/20/25  1138 02/10/25  1451    142 142   POTASSIUM 4.3 4.4 4.2   CHLORIDE 106 104 103   CO2 28 29 27   BUN 13.5 14.5 15.6   CR 0.86 1.00* 0.94   ANIONGAP 9 9  12   TAYLER 9.3 9.1 9.8   GLC 91 86 113*   PROTTOTAL 7.7 7.5 8.3   ALBUMIN 4.1 4.0 4.3    Most Recent 3 LFT's:  Recent Labs   Lab Test 02/27/25  1334 02/20/25  1138 02/10/25  1451   AST 41 78* 44   ALT 49 74* 52*   ALKPHOS 59 63 69   BILITOTAL 0.2 0.5 0.3    Most Recent 2 TSH and T4:  Recent Labs   Lab Test 05/16/24  1307 07/06/23  1216   TSH 0.47 0.77     Component      Latest Ref Rng 12/11/2024  8:17 AM 1/7/2025  11:07 AM 1/30/2025  11:38 AM 2/20/2025  11:38 AM 2/27/2025  1:34 PM   CEA      ng/mL 1.0  1.0  1.2  1.1  1.2        I reviewed the above labs today.    1. PET of the neck, chest, abdomen, and pelvis.  2. PET CT Fusion for Attenuation Correction and Anatomical  Localization:    3. 3D MIP and PET-CT fused images were processed on an independent  workstation and archived to PACS and reviewed by a radiologist.    PET February 25, 2025     Technique:     1. PET: The patient received 10.78 mCi of F-18-FDG; the serum glucose  was 104 mg/dL prior to administration, body weight was 81.9 kg. Images  were evaluated in the axial, sagittal, and coronal planes as well as  the rotational whole body MIP. Images were acquired from the Vertex to  the Feet.     UPTAKE WAS MEASURED AT 60 MINUTES.      2. CT: CT only obtained for attenuation correction and not diagnostic  purposes.     INDICATION: Carcinoma of upper-outer quadrant of left breast in  female, estrogen receptor positive (H); Carcinoma of upper-outer  quadrant of left breast in female, estrogen receptor positive (H)     ADDITIONAL INFORMATION OBTAINED FROM EMR: 70-year-old female diagnosed  in 2014 with a left breast cancer. Clinical staging of T4b N2 M1. ER  was positive in 100% of the cells, DE at 14% and HER2 was 3+ positive.   There were skeletal metastases in the thoracic vertebrae at 12, L1,  L3, L5 and S1 vertebral body. Ischial and right femur were also  involved, as well as a large iliac mass. Status post radiation in 1  dose of 6 Gy to the right iliac  lesion. Patient currently on letrozole  and trastuzuamb.  Denosumab has now been held for new diagnosis of  osteonecrosis of the R maxilla.     COMPARISON: CT 1/6/2025, PET CT 12/21/2017     FINDINGS:   BACKGROUND: Liver SUV max = 3.40, Aorta Blood SUV max = 3.38.      HEAD/NECK:  Few mildly avid nonenlarged nodes are similar to prior:   Right level 2 lymph node measures 7 x 12 mm (4/71) with a maximum SUV  of 3.70. Right parotid lymph node measuring 10 x 5 mm (4/75) with a  max SUV of 2.86.  Similar-appearing segmental sclerosis in the right hemimandible  medullary cavity.     CHEST:  No abnormal uptake. Postprocedural changes of left mastectomy.  Slightly decreased size of 5mm inferior anterior right middle lobe  (4/154) nodule, and unchanged 5 mm nodule at the left lung base  (4/154). These nodules do not demonstrate increased uptake (they are  below PET threshold) .     ABDOMEN AND PELVIS:  No abnormal uptake. Myomatous uterus. Hepatic steatosis.  Cholecystectomy.     LOWER EXTREMITIES:   No abnormal uptake.      BONES AND SOFT TISSUES:   No abnormal uptake. There are innumerable sclerotic lesions present  throughout the ribs, vertebral bodies, right femur, and pelvis. These  demonstrate heterogenous appearing uptake which is now below  background. Resolution of previously seen residual uptake by the right  iliac bone particularly acetabulum. iliac bone. No suspicious uptake.                                                                      IMPRESSION:   In this patient with history of metastatic left breast cancer:  1. No metabolically active disease. Stable appearance of the sclerotic  lesions throughout the axial and appendicular skeleton.  2. Stable couple of 5 mm pulmonary nodules.  3. Mildly avid nonenlarged right cervical lymph nodes are likely  reactive.     I have personally reviewed the examination and initial interpretation  and I agree with the findings.     DARLING ANNA MD     EXAM: MR LUMBAR  SPINE W/O & W CONTRAST  2/10/2025 5:53 PM      HISTORY:  severe acute onset midline back pain, known spinal  metastasis; Low back pain; r/o Cancer; No known/automatically detected  potential contraindications to imaging        COMPARISON:  CT CAP 1/6/2025     TECHNIQUE: Sagittal T1-weighted and T2-weighted and axial T2-weighted  images of the lumbar spine were obtained without intravenous contrast.  Post intravenous contrast using gadolinium axial and sagittal  T1-weighted images were obtained with fat saturation.     CONTRAST: gadobutrol (GADAVIST) injection 8 mL.     FINDINGS:  There are 5 lumbar type vertebrae, used for the purposes of this  dictation. Conus tip at L1. Normal lumbar vertebral alignment.  Heterogeneous marrow signal of the lumbar spine and visualized  portions of the sacrum/pelvis, compatible with known diffuse  metastatic disease. Most conspicuous lesions in the L1 and L3  vertebral bodies, as well as the right iliac bone. Mild disc space  narrowing with loss of the intrinsic T2 signal at T12-L1 and L1-L2.  Normal cauda equina.     On a level by level basis, the findings are as follows:     L1-2: Circumferential disc osteophyte complex. Mild bilateral  neuroforaminal stenosis. Mild spinal canal stenosis.     L2-3: Circumferential disc bulge. Bilateral facet arthropathy. Mild  bilateral neural foraminal stenosis. No spinal canal stenosis.     L3-4: Bilateral facet arthropathy. Mild left neural foraminal  stenosis. Tiny incidental left perineural cyst. Osseous and  periarticular edema of the left facet joint.     L4-5: Bilateral facet arthropathy, right greater than left, and  ligamentum flavum hypertrophy. Mild bilateral neuroforaminal stenosis.  No spinal canal stenosis. Osseous and periarticular edema of the right  facet joint.     L5-S1: Circumferential disc bulge with annular fissuring in the  central zone. Bilateral facet arthropathy, left greater than right. No  spinal canal or neural  foraminal stenosis.     The visualized paraspinous soft tissues are within normal limits.                                                                       IMPRESSION:  1. No acute fracture of the lumbar spine.  2. Multifocal metastases of the lumbar spine and visualized portions  of the sacrum/pelvis, similar to the CT 1/6/2025.  3. Multilevel lumbar spondylosis. No high-grade neuroforaminal or  spinal canal stenosis.  4. Active inflammatory arthropathy of the left L3-4 and right L4-5  facet joints.     I have personally reviewed the examination and initial interpretation  and I agree with the findings.     REBECCA OLMSTEAD MD         ASSESSMENT AND PLAN:       Hoda Brush is a 70 year old women with a history of ER-positive, MD-positive, HER2 positive breast cancer.  She is from [facility] and came to live in the [place] with [place] daughter.    She had metastatic disease at the time of presentation with bone-only metastases by report.  She underwent neoadjuvant CAF, had a left mastectomy, left axillary lymph node dissection, radiation to the right hip, which included the right iliac region where she had metastatic disease.  She was initially on tamoxifen but then was switched to letrozole.  She continues on this and every 3-week trastuzumab.  She has had no evidence of disease progression for the last 7 years and possible longer but we don't have detailed data from [facility].  Markers are low and stable.  We have continued Herceptin every 3 weeks as well as daily letrozole. Most recent CT CAP shows stable pulmonary nodules.   Clinically appropriate to continue herceptin on 3/13/25 pending laborary evaluation.   Marlo gonzalez testing sent in February 2025. No ESR1 mutation detected. 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   ONJ treated and received dental clearance to restart Xgeva on 2/27/25. Continue every three months.  Next echo 3/13/25.   Restage next in  May as scheduled.  Back pain.   Evaluated by rad onc for consideration of radiation given Dr. Aguiar's concern that the there may have been an area of penetration of the bone cortex and posterior aspect of the L1 vertebral body. Rad onc has recommended no radiation at this time. Continue tylenol. Follow up with spine ortho as scheduled on 3/18/25.   Follow up.  Continue every 3 week herceptin. Visit with Dr. Aguiar in May after restaging scans. Will be due for next Xgeva in May.       28 minutes spent on the date of the encounter doing chart review, review of test results, interpretation of tests, patient visit, and documentation      Anya Cartwright PA-C

## 2025-03-12 ENCOUNTER — VIRTUAL VISIT (OUTPATIENT)
Dept: ONCOLOGY | Facility: CLINIC | Age: 70
End: 2025-03-12
Payer: COMMERCIAL

## 2025-03-12 VITALS — HEIGHT: 62 IN | WEIGHT: 180 LBS | BODY MASS INDEX: 33.13 KG/M2

## 2025-03-12 DIAGNOSIS — C50.912 MALIGNANT NEOPLASM OF LEFT FEMALE BREAST, UNSPECIFIED ESTROGEN RECEPTOR STATUS, UNSPECIFIED SITE OF BREAST (H): Primary | ICD-10-CM

## 2025-03-12 DIAGNOSIS — C79.51 MALIGNANT NEOPLASM METASTATIC TO BONE (H): ICD-10-CM

## 2025-03-12 ASSESSMENT — PAIN SCALES - GENERAL: PAINLEVEL_OUTOF10: MODERATE PAIN (6)

## 2025-03-12 NOTE — NURSING NOTE
Patient confirms medications and allergies are accurate via patients echeck in completion, and or denies any changes since last reviewed/verified.     Current patient location:  [place]    Is the patient currently in the state of [place]? YES    Visit mode: VIDEO    If the visit is dropped, the patient can be reconnected by:VIDEO VISIT: Text to cell phone:   Telephone Information:   Mobile [phone]   Mobile [phone]       Will anyone else be joining the visit? NO  (If patient encounters technical issues they should call [phone])    Are changes needed to the allergy or medication list? No    Are refills needed on medications prescribed by this physician? NO    Rooming Documentation:  Questionnaire(s) completed    Reason for visit: RECHECK     Name (if in person): CATALINA   ID # (if video/phone): NA  Language: Gambian  Agency: NA  Phone Number: NA  Method of Interpretation: Video  Patient agrees to use  Services: Blanca HOPKINS

## 2025-03-12 NOTE — PROGRESS NOTES
Virtual Visit Details    Type of service:  Video Visit   Video Start Time:  10:04 AM  Video End Time: 10:12 AM    Originating Location (pt. Location): Home  Distant Location (provider location):  Off-site  Platform used for Video Visit: Nidia

## 2025-03-12 NOTE — LETTER
3/12/2025      Hoda Brush  [address]  [facility] [address]      Dear Colleague,    Thank you for referring your patient, Hoda Brush, to the [facility]. Please see a copy of my visit note below.    ONCOLOGY NOTE  Mar 12, 2025     Hoda Brush  Female, 69 year old, 1955  MRN: [id number]        Hoda is a 70 year old patient from [facility] and is seen here for continuation of care for her metastatic ER+HER2- breast cancer.  She is a refugee from [facility] and was initially seen in clinic with her daughter.  The only data we have from [facility] is an English translation of her records.       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  On 02/11/2014, she was seen by an oncologist.  The clinical staging of T4b N2 M1 was noted.   Her breast cancer was on the left side. There was no right breast cancer, confirmed by the patient and her daughter, correcting a possible error in the translated records.  ER was positive in 100% of the cells, UT at 14% and HER2 was 3+ positive.  It appears that this histopathologic information is on the mastectomy specimen. She underwent an MRI for staging of presumptive bone metastases which was performed 03/02/2014.  There were skeletal metastases in the thoracic vertebrae at 12, L1, L3, L5 and S1 vertebral body, ranging in size from 0.7-3.0 cm in size.  Ischial and right femur were also involved, as well as a large iliac mass measuring about 15 cm in the uterus. 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   Radiation Oncology consultation was performed 03/11/2014, and she was given radiation in 1 dose of 6 Gy to the right iliac lesion.        With the initial diagnosis, she initiated treatment with 6 cycles of CAF neoadjuvant chemotherapy 02/14, 07/07, 03/28, 04/18/14, 05/08 and 05/29/14. She also received monthly zoledronic acid.  She then  underwent a modified left mastectomy of Palacios type and left lymphadenoectomy on 06/27/2014.   Pathologic examination showed number at [facility] was [id number] showed infiltrative grade 2 ductal cancer with 6 level 1 metastatic lympFollow up with Jossie for visits and trastuzumab on 2-5, 2-26, 3-19 with CBC, CMP.  Echocardiogram on 3-19.  Follow up with me 4-9 with CBC, CMP, CA27.29 and CEA and with CT CAP on 4-8.h nodes and 3 level 2 metastatic lymph nodes.  The differentiation was intermediate.  The tumor was ER positive 70% of the cells, MS positive in 40% of the cells and HER2 was 3+ by immunohistochemistry and the Ki-67 labeling index was 20%. Her staging after surgery was stage IV, pT4b N2 M1.  I don't see a biopsy of the skeletal metastases.  She then had continuation with chemotherapy with 4 cycles of Herceptin and taxane with monthly zoledronic acid.  Tamoxifen was initiated.  She then had two years of Herceptin and a decision was made not to continue further Herceptin.  She continued on zoledronic acid every 3 months. She was clinically stable. [place] the [place] new [place] from [facility].  She has now been changed to letrozole.  Denosumab has now been held for new diagnosis of osteonecrosis of the R maxilla, resumed 2/2025 after a clear from a dentist.     History of cholecystectomy 2020.      TREATMENT HISTORY:  A. Initial diagnosis with metastatic breast cancer in [facility].  Neoadjuvant CAF x 6.   B. Left mastectomy. Left axillary node dissection.  C.  Radiation in 1 dose to R iliac region.  C. Herceptin for 2 years taxane for a prescribed course then stopped, monthly zoledronic acid.  She had 2 years of Herceptin with tamoxifen added after chemotherapy.  D.  Tamoxifen alone and zoledronic acid every 3 months starting 2014.  Letrozole was started   E.  [place]  We restarted Herceptin every 3 weeks and continued tamoxifen. Bone targeted agent changed to denosumab every 9  weeks. Zometa discontinued 2017.  Tamoxifen was changed to letrozole August 2019.    F.  Xgeva discontinued 12-17-19 because of dental issues.   G.  J+J vaccine March, 2021.  H.  Was on Zometa once May 11.  Reaction with eye lid swelling. Discontinued Zometa.  I.  Restart Xgeva 6-22-21.  Continuing the trastuzumab and letrozole.  Both tolerated well.   J.  Discontinue Xgeva because of osteonecrosis of the maxilla.   Continue trastuzumab and letrozole.   K. Acute back pain. MR L spine 2/10/2025 with stable bone metastases without cord compression and negative PET in bone lesions. L1 with periosteal involvement. Continue trantuzumab/letrozole. Sent liquid biopsy. Clear by a dentist, resumed Xgeva 2/27/2025.      INTERVAL HISTORY  Hoda is seen virtually. Her back is a little bit better now over the past week. She uses tylenol as needed in the afternoons. She can stand and sit for ~40 minutes maximum.  The back pain does not radiated down the legs. Tolerating transtuzumab and letrozole well. No nausea or vomiting. No diarrhea or constipation. Energy is very good. No new pains. No bladder concerns or changes. No skin changes or rashes. Appetite is good. Hydrating well. She restarted Xgeva on 2/27/25.     REVIEW OF SYSTEMS:  A 10-point review of systems is entirely negative.    Video physical exam  General: Patient appears well in no acute distress.   Skin: No visualized rash or lesions on visualized skin  Eyes: EOMI, no erythema, sclera icterus or discharge noted  Resp: Appears to be breathing comfortably without accessory muscle usage, speaking in full sentences, no cough  MSK: Appears to have normal range of motion based on visualized movements  Neurologic: No apparent tremors, facial movements symmetric  Psych: affect bright, alert and oriented     LABORATORY DATA:    Most Recent 3 CBC's:  Recent Labs   Lab Test 02/27/25  1334 02/20/25  1138 02/10/25  1451   WBC 8.6 8.8 8.0   HGB 12.6 13.2 14.0   MCV 93 93 93   PLT  189 234 160   ANEUTAUTO 4.4 4.8 4.3     Most Recent 3 BMP's:  Recent Labs   Lab Test 02/27/25  1334 02/20/25  1138 02/10/25  1451    142 142   POTASSIUM 4.3 4.4 4.2   CHLORIDE 106 104 103   CO2 28 29 27   BUN 13.5 14.5 15.6   CR 0.86 1.00* 0.94   ANIONGAP 9 9 12   TAYLER 9.3 9.1 9.8   GLC 91 86 113*   PROTTOTAL 7.7 7.5 8.3   ALBUMIN 4.1 4.0 4.3    Most Recent 3 LFT's:  Recent Labs   Lab Test 02/27/25  1334 02/20/25  1138 02/10/25  1451   AST 41 78* 44   ALT 49 74* 52*   ALKPHOS 59 63 69   BILITOTAL 0.2 0.5 0.3    Most Recent 2 TSH and T4:  Recent Labs   Lab Test 05/16/24  1307 07/06/23  1216   TSH 0.47 0.77     Component      Latest Ref Rng 12/11/2024  8:17 AM 1/7/2025  11:07 AM 1/30/2025  11:38 AM 2/20/2025  11:38 AM 2/27/2025  1:34 PM   CEA      ng/mL 1.0  1.0  1.2  1.1  1.2        I reviewed the above labs today.    1. PET of the neck, chest, abdomen, and pelvis.  2. PET CT Fusion for Attenuation Correction and Anatomical  Localization:    3. 3D MIP and PET-CT fused images were processed on an independent  workstation and archived to PACS and reviewed by a radiologist.    PET February 25, 2025     Technique:     1. PET: The patient received 10.78 mCi of F-18-FDG; the serum glucose  was 104 mg/dL prior to administration, body weight was 81.9 kg. Images  were evaluated in the axial, sagittal, and coronal planes as well as  the rotational whole body MIP. Images were acquired from the Vertex to  the Feet.     UPTAKE WAS MEASURED AT 60 MINUTES.      2. CT: CT only obtained for attenuation correction and not diagnostic  purposes.     INDICATION: Carcinoma of upper-outer quadrant of left breast in  female, estrogen receptor positive (H); Carcinoma of upper-outer  quadrant of left breast in female, estrogen receptor positive (H)     ADDITIONAL INFORMATION OBTAINED FROM EMR: 70-year-old female diagnosed  in 2014 with a left breast cancer. Clinical staging of T4b N2 M1. ER  was positive in 100% of the cells, PA at 14%  and HER2 was 3+ positive.   There were skeletal metastases in the thoracic vertebrae at 12, L1,  L3, L5 and S1 vertebral body. Ischial and right femur were also  involved, as well as a large iliac mass. Status post radiation in 1  dose of 6 Gy to the right iliac lesion. Patient currently on letrozole  and trastuzuamb.  Denosumab has now been held for new diagnosis of  osteonecrosis of the R maxilla.     COMPARISON: CT 1/6/2025, PET CT 12/21/2017     FINDINGS:   BACKGROUND: Liver SUV max = 3.40, Aorta Blood SUV max = 3.38.      HEAD/NECK:  Few mildly avid nonenlarged nodes are similar to prior:   Right level 2 lymph node measures 7 x 12 mm (4/71) with a maximum SUV  of 3.70. Right parotid lymph node measuring 10 x 5 mm (4/75) with a  max SUV of 2.86.  Similar-appearing segmental sclerosis in the right hemimandible  medullary cavity.     CHEST:  No abnormal uptake. Postprocedural changes of left mastectomy.  Slightly decreased size of 5mm inferior anterior right middle lobe  (4/154) nodule, and unchanged 5 mm nodule at the left lung base  (4/154). These nodules do not demonstrate increased uptake (they are  below PET threshold) .     ABDOMEN AND PELVIS:  No abnormal uptake. Myomatous uterus. Hepatic steatosis.  Cholecystectomy.     LOWER EXTREMITIES:   No abnormal uptake.      BONES AND SOFT TISSUES:   No abnormal uptake. There are innumerable sclerotic lesions present  throughout the ribs, vertebral bodies, right femur, and pelvis. These  demonstrate heterogenous appearing uptake which is now below  background. Resolution of previously seen residual uptake by the right  iliac bone particularly acetabulum. iliac bone. No suspicious uptake.                                                                      IMPRESSION:   In this patient with history of metastatic left breast cancer:  1. No metabolically active disease. Stable appearance of the sclerotic  lesions throughout the axial and appendicular skeleton.  2.  Stable couple of 5 mm pulmonary nodules.  3. Mildly avid nonenlarged right cervical lymph nodes are likely  reactive.     I have personally reviewed the examination and initial interpretation  and I agree with the findings.     DARLING ANNA MD     EXAM: MR LUMBAR SPINE W/O & W CONTRAST  2/10/2025 5:53 PM      HISTORY:  severe acute onset midline back pain, known spinal  metastasis; Low back pain; r/o Cancer; No known/automatically detected  potential contraindications to imaging        COMPARISON:  CT CAP 1/6/2025     TECHNIQUE: Sagittal T1-weighted and T2-weighted and axial T2-weighted  images of the lumbar spine were obtained without intravenous contrast.  Post intravenous contrast using gadolinium axial and sagittal  T1-weighted images were obtained with fat saturation.     CONTRAST: gadobutrol (GADAVIST) injection 8 mL.     FINDINGS:  There are 5 lumbar type vertebrae, used for the purposes of this  dictation. Conus tip at L1. Normal lumbar vertebral alignment.  Heterogeneous marrow signal of the lumbar spine and visualized  portions of the sacrum/pelvis, compatible with known diffuse  metastatic disease. Most conspicuous lesions in the L1 and L3  vertebral bodies, as well as the right iliac bone. Mild disc space  narrowing with loss of the intrinsic T2 signal at T12-L1 and L1-L2.  Normal cauda equina.     On a level by level basis, the findings are as follows:     L1-2: Circumferential disc osteophyte complex. Mild bilateral  neuroforaminal stenosis. Mild spinal canal stenosis.     L2-3: Circumferential disc bulge. Bilateral facet arthropathy. Mild  bilateral neural foraminal stenosis. No spinal canal stenosis.     L3-4: Bilateral facet arthropathy. Mild left neural foraminal  stenosis. Tiny incidental left perineural cyst. Osseous and  periarticular edema of the left facet joint.     L4-5: Bilateral facet arthropathy, right greater than left, and  ligamentum flavum hypertrophy. Mild bilateral neuroforaminal  stenosis.  No spinal canal stenosis. Osseous and periarticular edema of the right  facet joint.     L5-S1: Circumferential disc bulge with annular fissuring in the  central zone. Bilateral facet arthropathy, left greater than right. No  spinal canal or neural foraminal stenosis.     The visualized paraspinous soft tissues are within normal limits.                                                                       IMPRESSION:  1. No acute fracture of the lumbar spine.  2. Multifocal metastases of the lumbar spine and visualized portions  of the sacrum/pelvis, similar to the CT 1/6/2025.  3. Multilevel lumbar spondylosis. No high-grade neuroforaminal or  spinal canal stenosis.  4. Active inflammatory arthropathy of the left L3-4 and right L4-5  facet joints.     I have personally reviewed the examination and initial interpretation  and I agree with the findings.     REBECCA OLMSTEAD MD         ASSESSMENT AND PLAN:       Hoda Brush is a 70 year old women with a history of ER-positive, NC-positive, HER2 positive breast cancer.  She is from [facility] and came to live in the [place] with her daughter.    She had metastatic disease at the time of presentation with bone-only metastases by report.  She underwent neoadjuvant CAF, had a left mastectomy, left axillary lymph node dissection, radiation to the right hip, which included the right iliac region where she had metastatic disease.  She was initially on tamoxifen but then was switched to letrozole.  She continues on this and every 3-week trastuzumab.  She has had no evidence of disease progression for the last 7 years and possible longer but we don't have detailed data from [facility].  Markers are low and stable.  We have continued Herceptin every 3 weeks as well as daily letrozole. Most recent CT CAP shows stable pulmonary nodules.   Clinically appropriate to continue herceptin on 3/13/25 pending laborary evaluation.   Marlo assure testing sent in  "February 2025. No ESR1 mutation detected. 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   ONJ treated and received dental clearance to restart Xgeva on 2/27/25. Continue every three months.  Next echo 3/13/25.   Restage next in May as scheduled.  Back pain.   Evaluated by rad onc for consideration of radiation given Dr. Aguiar's concern that the there may have been an area of penetration of the bone cortex and posterior aspect of the L1 vertebral body. Rad onc has recommended no radiation at this time. Continue tylenol. Follow up with spine ortho as scheduled on 3/18/25.   Follow up.  Continue every 3 week herceptin. Visit with Dr. Aguiar in May after restaging scans. Will be due for next Xgeva in May.       28 minutes spent on the date of the encounter doing chart review, review of test results, interpretation of tests, patient visit, and documentation      Anya Cartwright PA-C      Virtual Visit Details    Type of service:  Video Visit   Video Start Time: {video visit start/end time for provider to select:425332}  Video End Time:{video visit start/end time for provider to select:787505}    Originating Location (pt. Location): {video visit patient location:380676::\"Home\"}  {PROVIDER LOCATION On-site should be selected for visits conducted from your clinic location or adjoining [facility] hospital, academic office, or other nearby [facility] building. Off-site should be selected for all other provider locations, including home:017237}  Distant Location (provider location):  {virtual location provider:351670}  Platform used for Video Visit: {Virtual Visit Platforms:738578::\"[email or web address]\"}      Again, thank you for allowing me to participate in the care of your patient.        Sincerely,        Anya Cartwright PA-C    Electronically signed"

## 2025-03-13 ENCOUNTER — APPOINTMENT (OUTPATIENT)
Dept: LAB | Facility: CLINIC | Age: 70
End: 2025-03-13
Attending: NURSE PRACTITIONER
Payer: COMMERCIAL

## 2025-03-13 ENCOUNTER — INFUSION THERAPY VISIT (OUTPATIENT)
Dept: ONCOLOGY | Facility: CLINIC | Age: 70
End: 2025-03-13
Attending: INTERNAL MEDICINE
Payer: COMMERCIAL

## 2025-03-13 VITALS
DIASTOLIC BLOOD PRESSURE: 80 MMHG | OXYGEN SATURATION: 95 % | WEIGHT: 182.1 LBS | SYSTOLIC BLOOD PRESSURE: 122 MMHG | RESPIRATION RATE: 16 BRPM | HEART RATE: 83 BPM | TEMPERATURE: 98 F | BODY MASS INDEX: 33.31 KG/M2

## 2025-03-13 DIAGNOSIS — C50.912 MALIGNANT NEOPLASM OF LEFT FEMALE BREAST, UNSPECIFIED ESTROGEN RECEPTOR STATUS, UNSPECIFIED SITE OF BREAST (H): Primary | ICD-10-CM

## 2025-03-13 DIAGNOSIS — Z17.0 CARCINOMA OF UPPER-OUTER QUADRANT OF LEFT BREAST IN FEMALE, ESTROGEN RECEPTOR POSITIVE (H): ICD-10-CM

## 2025-03-13 DIAGNOSIS — C50.412 CARCINOMA OF UPPER-OUTER QUADRANT OF LEFT BREAST IN FEMALE, ESTROGEN RECEPTOR POSITIVE (H): ICD-10-CM

## 2025-03-13 DIAGNOSIS — M54.50 LUMBAR PAIN: Primary | ICD-10-CM

## 2025-03-13 LAB
ALBUMIN SERPL BCG-MCNC: 4 G/DL (ref 3.5–5.2)
ALP SERPL-CCNC: 59 U/L (ref 40–150)
ALT SERPL W P-5'-P-CCNC: 54 U/L (ref 0–50)
ANION GAP SERPL CALCULATED.3IONS-SCNC: 9 MMOL/L (ref 7–15)
AST SERPL W P-5'-P-CCNC: 54 U/L (ref 0–45)
BASOPHILS # BLD AUTO: 0 10E3/UL (ref 0–0.2)
BASOPHILS NFR BLD AUTO: 1 %
BILIRUB SERPL-MCNC: 0.3 MG/DL
BUN SERPL-MCNC: 11.3 MG/DL (ref 8–23)
CALCIUM SERPL-MCNC: 9 MG/DL (ref 8.8–10.4)
CEA SERPL-MCNC: 1.1 NG/ML
CHLORIDE SERPL-SCNC: 105 MMOL/L (ref 98–107)
CREAT SERPL-MCNC: 0.81 MG/DL (ref 0.51–0.95)
EGFRCR SERPLBLD CKD-EPI 2021: 78 ML/MIN/1.73M2
EOSINOPHIL # BLD AUTO: 0.2 10E3/UL (ref 0–0.7)
EOSINOPHIL NFR BLD AUTO: 3 %
ERYTHROCYTE [DISTWIDTH] IN BLOOD BY AUTOMATED COUNT: 13.7 % (ref 10–15)
GLUCOSE SERPL-MCNC: 94 MG/DL (ref 70–99)
HCO3 SERPL-SCNC: 28 MMOL/L (ref 22–29)
HCT VFR BLD AUTO: 39.6 % (ref 35–47)
HGB BLD-MCNC: 12.2 G/DL (ref 11.7–15.7)
IMM GRANULOCYTES # BLD: 0 10E3/UL
IMM GRANULOCYTES NFR BLD: 0 %
LYMPHOCYTES # BLD AUTO: 2.6 10E3/UL (ref 0.8–5.3)
LYMPHOCYTES NFR BLD AUTO: 41 %
MCH RBC QN AUTO: 28.8 PG (ref 26.5–33)
MCHC RBC AUTO-ENTMCNC: 30.8 G/DL (ref 31.5–36.5)
MCV RBC AUTO: 94 FL (ref 78–100)
MONOCYTES # BLD AUTO: 0.5 10E3/UL (ref 0–1.3)
MONOCYTES NFR BLD AUTO: 7 %
NEUTROPHILS # BLD AUTO: 3 10E3/UL (ref 1.6–8.3)
NEUTROPHILS NFR BLD AUTO: 48 %
NRBC # BLD AUTO: 0 10E3/UL
NRBC BLD AUTO-RTO: 0 /100
PLATELET # BLD AUTO: 193 10E3/UL (ref 150–450)
POTASSIUM SERPL-SCNC: 4.3 MMOL/L (ref 3.4–5.3)
PROT SERPL-MCNC: 7.3 G/DL (ref 6.4–8.3)
RBC # BLD AUTO: 4.23 10E6/UL (ref 3.8–5.2)
SODIUM SERPL-SCNC: 142 MMOL/L (ref 135–145)
WBC # BLD AUTO: 6.4 10E3/UL (ref 4–11)

## 2025-03-13 PROCEDURE — 80053 COMPREHEN METABOLIC PANEL: CPT

## 2025-03-13 PROCEDURE — 250N000011 HC RX IP 250 OP 636: Performed by: INTERNAL MEDICINE

## 2025-03-13 PROCEDURE — 36591 DRAW BLOOD OFF VENOUS DEVICE: CPT

## 2025-03-13 PROCEDURE — 82378 CARCINOEMBRYONIC ANTIGEN: CPT

## 2025-03-13 PROCEDURE — 258N000003 HC RX IP 258 OP 636: Performed by: INTERNAL MEDICINE

## 2025-03-13 PROCEDURE — 85018 HEMOGLOBIN: CPT

## 2025-03-13 PROCEDURE — 85004 AUTOMATED DIFF WBC COUNT: CPT

## 2025-03-13 RX ORDER — HEPARIN SODIUM (PORCINE) LOCK FLUSH IV SOLN 100 UNIT/ML 100 UNIT/ML
500 SOLUTION INTRAVENOUS ONCE
Status: COMPLETED | OUTPATIENT
Start: 2025-03-13 | End: 2025-03-13

## 2025-03-13 RX ORDER — HEPARIN SODIUM (PORCINE) LOCK FLUSH IV SOLN 100 UNIT/ML 100 UNIT/ML
5 SOLUTION INTRAVENOUS
Status: DISCONTINUED | OUTPATIENT
Start: 2025-03-13 | End: 2025-03-13 | Stop reason: HOSPADM

## 2025-03-13 RX ADMIN — Medication 500 UNITS: at 10:46

## 2025-03-13 RX ADMIN — HEPARIN 5 ML: 100 SYRINGE at 12:05

## 2025-03-13 RX ADMIN — SODIUM CHLORIDE 500 MG: 0.9 INJECTION, SOLUTION INTRAVENOUS at 11:31

## 2025-03-13 ASSESSMENT — PAIN SCALES - GENERAL: PAINLEVEL_OUTOF10: NO PAIN (0)

## 2025-03-13 NOTE — NURSING NOTE
Chief Complaint   Patient presents with    Port Draw     Labs drawn from port by RN     Port accessed with 20 gauge, 3/4 inch, flat needle by RN, labs collected, line flushed with saline and heparin.  Vitals taken. Pt checked in for appointment(s).     Sondra Martines, RN

## 2025-03-13 NOTE — PATIENT INSTRUCTIONS
[facility] Triage and after hours / weekends / holidays:  [phone]    Please call the triage or after hours line if you experience a temperature greater than or equal to 100.4, shaking chills, have uncontrolled nausea, vomiting and/or diarrhea, dizziness, shortness of breath, chest pain, bleeding, unexplained bruising, or if you have any other new/concerning symptoms, questions or concerns.      If you are having any concerning symptoms or wish to speak to a provider before your next infusion visit, please call triage to notify them so we can adequately serve you.     If you need a refill on a narcotic prescription or other medication, please call before your infusion appointment.                March 2025 Sunday Monday Tuesday Wednesday Thursday Friday Saturday                                 1       2     3     4    NEW RADIATION ONCOLOGY   9:45 AM   (60 min.)   Codey Burr MD   [facility] Radiation Oncology [place] 5     6     7     8       9     10     11     12    RETURN CCSL  10:00 AM   (45 min.)   Anya Cartwright PA-C   [facility] Cancer [facility] 13    ECHO COMPLETE   9:45 AM   (60 min.)   [email or web address]   [facility] Heart [facility]    LAB CENTRAL  11:30 AM   (15 min.)   [facility] DRAW   [facility]    ONC INFUSION 1 HR (60 MIN)  12:00 PM   (60 min.)    ONC INFUSION NURSE   [facility] 14     15       16     17     18    XR GENERAL XRAY   8:40 AM   (20 min.)   [email or web address]   [facility] Orthopedic Xray [place]    NEW LUMBAR SPINE   9:00 AM   (15 min.)   Jaime Coulter MD   [facility] Orthopedic Clinic [place] 19     20     21     22       23     24     25     26     27     28     29       30     31                                          April 2025 Sunday Monday Tuesday Wednesday Thursday Friday Saturday             1     2     3    LAB CENTRAL   9:45 AM   (15  min.)   [facility] LAB DRAW   [facility]    ONC INFUSION 1 HR (60 MIN)  12:00 PM   (60 min.)    ONC INFUSION NURSE   [facility] 4     5       6     7     8     9     10     11     12       13     14     15     16     17     18     19       20     21     22     23     24    LAB CENTRAL  11:30 AM   (15 min.)   [facility] DRAW   [facility]    ONC INFUSION 1 HR (60 MIN)  12:00 PM   (60 min.)    ONC INFUSION NURSE   [facility] 25     26       27     28     29     30                                    Lab Results:  Recent Results (from the past 12 hours)   Echocardiogram Complete    Collection Time: 03/13/25 10:22 AM   Result Value Ref Range    LVEF  55-60%    COMPREHENSIVE METABOLIC PANEL    Collection Time: 03/13/25 10:46 AM   Result Value Ref Range    Sodium 142 135 - 145 mmol/L    Potassium 4.3 3.4 - 5.3 mmol/L    Carbon Dioxide (CO2) 28 22 - 29 mmol/L    Anion Gap 9 7 - 15 mmol/L    Urea Nitrogen 11.3 8.0 - 23.0 mg/dL    Creatinine 0.81 0.51 - 0.95 mg/dL    GFR Estimate 78 >60 mL/min/1.73m2    Calcium 9.0 8.8 - 10.4 mg/dL    Chloride 105 98 - 107 mmol/L    Glucose 94 70 - 99 mg/dL    Alkaline Phosphatase 59 40 - 150 U/L    AST 54 (H) 0 - 45 U/L    ALT 54 (H) 0 - 50 U/L    Protein Total 7.3 6.4 - 8.3 g/dL    Albumin 4.0 3.5 - 5.2 g/dL    Bilirubin Total 0.3 <=1.2 mg/dL   CBC with platelets and differential    Collection Time: 03/13/25 10:46 AM   Result Value Ref Range    WBC Count 6.4 4.0 - 11.0 10e3/uL    RBC Count 4.23 3.80 - 5.20 10e6/uL    Hemoglobin 12.2 11.7 - 15.7 g/dL    Hematocrit 39.6 35.0 - 47.0 %    MCV 94 78 - 100 fL    MCH 28.8 26.5 - 33.0 pg    MCHC 30.8 (L) 31.5 - 36.5 g/dL    RDW 13.7 10.0 - 15.0 %    Platelet Count 193 150 - 450 10e3/uL    % Neutrophils 48 %    % Lymphocytes 41 %    % Monocytes 7 %    % Eosinophils 3 %    % Basophils 1 %    % Immature Granulocytes 0 %    NRBCs  per 100 WBC 0 <1 /100    Absolute Neutrophils 3.0 1.6 - 8.3 10e3/uL    Absolute Lymphocytes 2.6 0.8 - 5.3 10e3/uL    Absolute Monocytes 0.5 0.0 - 1.3 10e3/uL    Absolute Eosinophils 0.2 0.0 - 0.7 10e3/uL    Absolute Basophils 0.0 0.0 - 0.2 10e3/uL    Absolute Immature Granulocytes 0.0 <=0.4 10e3/uL    Absolute NRBCs 0.0 10e3/uL

## 2025-03-13 NOTE — PROGRESS NOTES
Infusion Nursing Note:  Hoda Brush presents today for cycle 128 day 1 trastuzumab-qyyp.    Patient seen by provider today: No   present during visit today: Not Applicable.    Note: Pt comes to infusion today with no questions or concerns. Pt has moderate pain in her mid back today which is unchanged and denies any need for intervention at this appointment.  She  has been afebrile and denies signs and symptoms of infection including: cough, SOB, sore throat, diarrhea, vomiting, rash, or pain with urination.  Pt wishes to proceed with today's planned treatment.    Intravenous Access:  Implanted Port.    Treatment Conditions:  Lab Results   Component Value Date    HGB 12.2 03/13/2025    WBC 6.4 03/13/2025    ANEU 3.1 06/22/2021    ANEUTAUTO 3.0 03/13/2025     03/13/2025     Lab Results   Component Value Date     03/13/2025    POTASSIUM 4.3 03/13/2025    CR 0.81 03/13/2025    TAYLER 9.0 03/13/2025    BILITOTAL 0.3 03/13/2025    ALBUMIN 4.0 03/13/2025    ALT 54 (H) 03/13/2025    AST 54 (H) 03/13/2025     Results reviewed, labs MET treatment parameters, ok to proceed with treatment.  ECHO 03/13/25 EF 55-60%.    Post Infusion Assessment:  Patient tolerated infusion without incident.  Blood return noted pre and post infusion.  Site patent and intact, free from redness, edema or discomfort.  No evidence of extravasations.  Access discontinued per protocol.     Discharge Plan:   Patient declined prescription refills.  Discharge instructions reviewed with: Patient.  Patient and/or family verbalized understanding of discharge instructions and all questions answered.  AVS to patient via Social Touch[employer].  Patient will return 04/03/25 for next appointment.   Patient discharged in stable condition accompanied by: self.  Departure Mode: Ambulatory.      Emily Meese, RN

## 2025-03-18 ENCOUNTER — PRE VISIT (OUTPATIENT)
Dept: NEUROSURGERY | Facility: CLINIC | Age: 70
End: 2025-03-18

## 2025-03-18 ENCOUNTER — ANCILLARY PROCEDURE (OUTPATIENT)
Dept: GENERAL RADIOLOGY | Facility: CLINIC | Age: 70
End: 2025-03-18
Attending: ORTHOPAEDIC SURGERY
Payer: COMMERCIAL

## 2025-03-18 ENCOUNTER — OFFICE VISIT (OUTPATIENT)
Dept: ORTHOPEDICS | Facility: CLINIC | Age: 70
End: 2025-03-18
Payer: COMMERCIAL

## 2025-03-18 VITALS — HEIGHT: 63 IN | WEIGHT: 181 LBS | BODY MASS INDEX: 32.07 KG/M2

## 2025-03-18 DIAGNOSIS — C79.51 MALIGNANT NEOPLASM METASTATIC TO BONE (H): ICD-10-CM

## 2025-03-18 DIAGNOSIS — M54.50 LUMBAR PAIN: Primary | ICD-10-CM

## 2025-03-18 DIAGNOSIS — M54.50 LUMBAR PAIN: ICD-10-CM

## 2025-03-18 DIAGNOSIS — M54.16 LUMBAR RADICULOPATHY: ICD-10-CM

## 2025-03-18 PROCEDURE — 1125F AMNT PAIN NOTED PAIN PRSNT: CPT | Performed by: ORTHOPAEDIC SURGERY

## 2025-03-18 PROCEDURE — 72110 X-RAY EXAM L-2 SPINE 4/>VWS: CPT | Performed by: STUDENT IN AN ORGANIZED HEALTH CARE EDUCATION/TRAINING PROGRAM

## 2025-03-18 PROCEDURE — 99204 OFFICE O/P NEW MOD 45 MIN: CPT | Mod: GC | Performed by: ORTHOPAEDIC SURGERY

## 2025-03-18 NOTE — NURSING NOTE
"Reason For Visit:   Chief Complaint   Patient presents with    Consult     Low back pain. Referral from Judy Kraus        Primary MD: Laury Dee  Ref. MD: Judy Kraus PA-C    ?  Yes, specify language: Luxembourger  Occupation Retired.  Currently working? No.  Work status?  Retired.  Date of injury: N/A  Type of injury: N/A.  Date of surgery: N/A  Type of surgery: N/A.  Smoker: No  Request smoking cessation information: No    Ht 1.6 m (5' 3\")   Wt 82.1 kg (181 lb)   BMI 32.06 kg/m      Pain Assessment  Patient Currently in Pain: Yes  Patient's Stated Pain Goal: 7  0-10 Pain Scale: 7  Primary Pain Location: Back    Oswestry (YAYA) Questionnaire        3/9/2025     5:32 PM   OSWESTRY DISABILITY INDEX   Count 10    Sum 20    Oswestry Score (%) 40 %        Patient-reported            Neck Disability Index (NDI) Questionnaire         No data to display                       Visual Analog Pain Scale  Back Pain Scale 0-10: 0  Right leg pain: 0  Left leg pain: 0  Neck Pain Scale 0-10: 0  Right arm pain: 0  Left arm pain: 0    Promis 10 Assessment        3/17/2025     9:19 AM   PROMIS 10   In general, would you say your health is: Fair   In general, would you say your quality of life is: Good   In general, how would you rate your physical health? Good   In general, how would you rate your mental health, including your mood and your ability to think? Good   In general, how would you rate your satisfaction with your social activities and relationships? Good   In general, please rate how well you carry out your usual social activities and roles Good   To what extent are you able to carry out your everyday physical activities such as walking, climbing stairs, carrying groceries, or moving a chair? Mostly   In the past 7 days, how often have you been bothered by emotional problems such as feeling anxious, depressed, or irritable? Never   In the past 7 days, how would you rate your fatigue on average? Moderate "   In the past 7 days, how would you rate your pain on average, where 0 means no pain, and 10 means worst imaginable pain? 5   In general, would you say your health is: 2   In general, would you say your quality of life is: 3   In general, how would you rate your physical health? 3   In general, how would you rate your mental health, including your mood and your ability to think? 3   In general, how would you rate your satisfaction with your social activities and relationships? 3   In general, please rate how well you carry out your usual social activities and roles. (This includes activities at home, at work and in your community, and responsibilities as a parent, child, spouse, employee, friend, etc.) 3   To what extent are you able to carry out your everyday physical activities such as walking, climbing stairs, carrying groceries, or moving a chair? 4   In the past 7 days, how often have you been bothered by emotional problems such as feeling anxious, depressed, or irritable? 1   In the past 7 days, how would you rate your fatigue on average? 3   In the past 7 days, how would you rate your pain on average, where 0 means no pain, and 10 means worst imaginable pain? 5   Global Mental Health Score 14    Global Physical Health Score 13    PROMIS TOTAL - SUBSCORES 27        Patient-reported                Jaz Fowler CMA

## 2025-03-18 NOTE — LETTER
3/18/2025      Hoda Brush  [address]  [place] [address]      Dear Colleague,    Thank you for referring your patient, Hoda Brush, to the [facility] [place]. Please see a copy of my visit note below.    Spine Surgery Consultation    REFERRING PHYSICIAN: Judy Kraus   PRIMARY CARE PHYSICIAN: Laury Dee           Chief Complaint:   No chief complaint on file.    History of Present Illness:  Symptom Profile Including: location of symptoms, onset, severity, exacerbating/alleviating factors, previous treatments:        Hoda Brush is a 70 year old female with history of metastatic breast cancer diagnosed in 2014 who  presents for an evaluation of her lumbar spine.  She has known metastatic disease to her lower thoracic and lumbar vertebrae.  She has mid axial low back pain that is present when sitting and lying flat on her back.  There is no radicular component.  It has been relatively stable for years however worsening over the past couple months.  She has no weakness or numbness in her legs.  She takes Tylenol, was previously on a short course of a muscle relaxant, otherwise has not had any injections or physical therapy formally.  She has not had any radiation therapy to the spine.  Her chemotherapy is every 21 days.         Past Medical History:     Past Medical History:   Diagnosis Date     Breast cancer metastasized to bone (H)      Lymphedema of left upper extremity      Personal history of chemotherapy      S/P radiation therapy     6 Gy to right iliac region in 11/2014 - [facility]            Past Surgical History:     Past Surgical History:   Procedure Laterality Date     APPENDECTOMY       BIOPSY  2014     COLONOSCOPY N/A 2/1/2018    Procedure: COLONOSCOPY;  Colonoscopy;  Surgeon: Phillip Rowe MD;  Location:  GI     ESOPHAGOSCOPY, GASTROSCOPY, DUODENOSCOPY (EGD), COMBINED N/A 2/16/2018    Procedure: COMBINED  ESOPHAGOSCOPY, GASTROSCOPY, DUODENOSCOPY (EGD), BIOPSY SINGLE OR MULTIPLE;;  Surgeon: Paty Herman MD;  Location: [facility] GI     ESOPHAGOSCOPY, GASTROSCOPY, DUODENOSCOPY (EGD), COMBINED N/A 2/5/2020    Procedure: ESOPHAGOGASTRODUODENOSCOPY (EGD);  Surgeon: Anthony Alonso MD;  Location: [facility] GI     INSERT PORT VASCULAR ACCESS Right 9/15/2017    Procedure: INSERT PORT VASCULAR ACCESS;  Central venous chest port placement, right;  Surgeon: Dmitriy Hernandez PA-C;  [facility]: [facility]     LAPAROSCOPIC CHOLECYSTECTOMY N/A 8/31/2019    Procedure: LAPAROSCOPIC CHOLECYSTECTOMY;  Surgeon: Maldonado Billy MD;  Location: [facility] OR     MASTECTOMY Left 06/27/2014    and lymph node resection     MASTECTOMY SIMPLE BILATERAL      Mastectomy 06/27/2014            Social History:     Social History     Tobacco Use     Smoking status: Never     Passive exposure: Never     Smokeless tobacco: Never   Substance Use Topics     Alcohol use: No            Family History:     Family History   Problem Relation Age of Onset     Breast Cancer Mother 45     Lung Cancer Father         smoker     Breast Cancer Sister 61            Allergies:     Allergies   Allergen Reactions     Zometa [Zoledronic Acid] Swelling            Medications:     Current Outpatient Medications   Medication Sig Dispense Refill     acetaminophen (TYLENOL) 500 MG tablet Take 1,000 mg by mouth every 6 hours as needed for mild pain       calcium carbonate (OS-TAYLER) 500 MG tablet Take 1 tablet by mouth daily       fluticasone (FLONASE) 50 MCG/ACT nasal spray Spray 2 sprays into both nostrils daily 16 g 3     letrozole (FEMARA) 2.5 MG tablet Take 1 tablet (2.5 mg) by mouth daily 90 tablet 3     mometasone-formoterol (DULERA) 100-5 MCG/ACT inhaler Inhale 2 puffs into the lungs 2 times daily. (Patient not taking: Reported on 3/4/2025) 13 g 4     order for DME Equipment being ordered: 2 Mastectomy bras and 1 prosthetheses. 2 Piece 0     pantoprazole (PROTONIX) 40 MG EC  tablet Take 1 tablet (40 mg) by mouth daily. 90 tablet 3     predniSONE (DELTASONE) 20 MG tablet Take two tablets (= 40mg) each day for 5 (five) days (Patient not taking: Reported on 2/24/2025) 10 tablet 0     traZODone (DESYREL) 50 MG tablet TAKE 1/2 TABLET(25 MG) BY MOUTH EVERY NIGHT AS NEEDED FOR SLEEP (Patient not taking: Reported on 1/30/2025) 45 tablet 2     VITAMIN D, CHOLECALCIFEROL, PO Take 1,000 Units by mouth daily       No current facility-administered medications for this visit.             Review of Systems:     A 10 point ROS was performed and reviewed. Specific responses to these questions are noted in the HPI.         Physical Exam:   Vitals: There were no vitals taken for this visit.  Constitutional: awake, alert, cooperative, no apparent distress, appears stated age.    Eyes: The sclera are white.  Ears, Nose, Throat: The trachea is midline.  Psychiatric: The patient has a normal affect.  Respiratory: breathing non-labored  Cardiovascular: The extremities are warm and perfused.  Skin: no obvious rashes or lesions.  Musculoskeletal, Neurologic, and Spine:     Lumbar Spine:    Appearance: no gross stepoffs or deformities  Normal gait without assistive devices.  No antalgia / imbalance.    Motor:   L2-3: Hip flexion R 5/5  And L 5/5 strength          L3/4: Knee extension R 5/5 and L 5/5 strength         L4/5: Ankle dorsiflexion R 5/5 L 5/5 and       EHL R 5/5 L 5/5 strength         S1:  Ankle plantarflexion/Peroneal Muscles  R 5/5 and L 5/5 strength    Sensation: intact to light touch L3-S1 distribution BLE        Straight leg raise negative    Neurologic:      REFLEXES Right Left   Patella 2+ 2+   Ankle jerk 2+ 2+   Babinski No upgoing great toe No upgoing great toe   Clonus 0 beats 0 beats     Tenderness to palpation diffusely around the lower lumbar spine worse on the right side paraspinal musculature         Imaging:   We ordered and independently reviewed new radiographs at this clinic visit.  The results were discussed with the patient.  Findings include:    Lumbar spine x-rays demonstrate multilevel spondylosis worse at T12-L1 and L1-L2 with disc space narrowing and endplate changes.  No listhesis.    MRI lumbar spine on 2/10/25 was also personally reviewed:   1. No acute fracture of the lumbar spine.  2. Multifocal metastases of the lumbar spine and visualized portions of the sacrum/pelvis, similar to the CT 1/6/2025. Heterogeneous marrow signal of the lumbar spine and visualized portions of the sacrum/pelvis, compatible with known diffuse metastatic disease. Most conspicuous lesions in the L1 and L3 vertebral bodies, as well as the right iliac bone. Mild disc space narrowing with loss of the intrinsic T2 signal at T12-L1 and L1-L2.  3. Multilevel lumbar spondylosis. No high-grade neuroforaminal or  spinal canal stenosis.  4. Active inflammatory arthropathy of the left L3-4 and right L4-5  facet joints.           Assessment and Plan:   Assessment:  70 year old female with history of metastatic breast cancer and chronic low back pain with known stable metastatic disease     Plan:  Reviewed the patient's history, exam and radiographic findings.  She has known metastatic disease to her spine however this has been stable over time.  All of her pain is mid axial low back pain without any radicular component or weakness/sensory changes in the lower extremities.  Therefore we discussed the most reasonable treatment option would be conservative.  This would consist of continued Tylenol, inflammatory medications, physical therapy, and possibly consideration of injections as she does have degenerative changes in her lumbar spine as well.  She was amenable to this plan.  We will write a formal physical therapy referral and refer as well to the pain clinic.  She will follow-up on an as-needed basis in this clinic.    --  Warren Curry MD  Orthopedic Surgery PGY4     Attending MD (Dr. Jaime Coulter) : I  personally performed greater than 50% of the effort related to this patient visit and am responsible for the medical decision making and billing in this case.  I met with the patient, reviewed and verified the history and physical exam of the patient and discussed the patient's management with the other clinical providers involved in this patient's care including any involved residents or physicians assistants. I also personally reviewed the imaging and I personally formulated the treatment plan and diagnosis in their entirety.  I reviewed the above note and agree with the documented findings and plan of care, which were communicated to the patient.      Jaime Coulter MD      Again, thank you for allowing me to participate in the care of your patient.        Sincerely,        Jaime Coulter MD    Electronically signed

## 2025-03-18 NOTE — PROGRESS NOTES
Spine Surgery Consultation    REFERRING PHYSICIAN: Judy Kraus   PRIMARY CARE PHYSICIAN: Laury Dee           Chief Complaint:   No chief complaint on file.    History of Present Illness:  Symptom Profile Including: location of symptoms, onset, severity, exacerbating/alleviating factors, previous treatments:        Hoda Brush is a 70 year old female with history of metastatic breast cancer diagnosed in 2014 who  presents for an evaluation of her lumbar spine.  She has known metastatic disease to her lower thoracic and lumbar vertebrae.  She has mid axial low back pain that is present when sitting and lying flat on her back.  There is no radicular component.  It has been relatively stable for years however worsening over the past couple months.  She has no weakness or numbness in her legs.  She takes Tylenol, was previously on a short course of a muscle relaxant, otherwise has not had any injections or physical therapy formally.  She has not had any radiation therapy to the spine.  Her chemotherapy is every 21 days.         Past Medical History:     Past Medical History:   Diagnosis Date    Breast cancer metastasized to bone (H)     Lymphedema of left upper extremity     Personal history of chemotherapy     S/P radiation therapy     6 Gy to right iliac region in 11/2014 - [facility]            Past Surgical History:     Past Surgical History:   Procedure Laterality Date    APPENDECTOMY      BIOPSY  2014    COLONOSCOPY N/A 2/1/2018    Procedure: COLONOSCOPY;  Colonoscopy;  Surgeon: Phillip Rowe MD;  Location:  GI    ESOPHAGOSCOPY, GASTROSCOPY, DUODENOSCOPY (EGD), COMBINED N/A 2/16/2018    Procedure: COMBINED ESOPHAGOSCOPY, GASTROSCOPY, DUODENOSCOPY (EGD), BIOPSY SINGLE OR MULTIPLE;;  Surgeon: Paty Herman MD;  Location:  GI    ESOPHAGOSCOPY, GASTROSCOPY, DUODENOSCOPY (EGD), COMBINED N/A 2/5/2020    Procedure: ESOPHAGOGASTRODUODENOSCOPY (EGD);  Surgeon: Anthony Alonso  MD Mariano;  Location: [facility] GI    INSERT PORT VASCULAR ACCESS Right 9/15/2017    Procedure: INSERT PORT VASCULAR ACCESS;  Central venous chest port placement, right;  Surgeon: Dmitriy Hernandez PA-C;  [facility]    LAPAROSCOPIC CHOLECYSTECTOMY N/A 8/31/2019    Procedure: LAPAROSCOPIC CHOLECYSTECTOMY;  Surgeon: Maldonado Billy MD;  Location: [facility] OR    MASTECTOMY Left 06/27/2014    and lymph node resection    MASTECTOMY SIMPLE BILATERAL      Mastectomy 06/27/2014            Social History:     Social History     Tobacco Use    Smoking status: Never     Passive exposure: Never    Smokeless tobacco: Never   Substance Use Topics    Alcohol use: No            Family History:     Family History   Problem Relation Age of Onset    Breast Cancer Mother 45    Lung Cancer Father         smoker    Breast Cancer Sister 61            Allergies:     Allergies   Allergen Reactions    Zometa [Zoledronic Acid] Swelling            Medications:     Current Outpatient Medications   Medication Sig Dispense Refill    acetaminophen (TYLENOL) 500 MG tablet Take 1,000 mg by mouth every 6 hours as needed for mild pain      calcium carbonate (OS-TAYLER) 500 MG tablet Take 1 tablet by mouth daily      fluticasone (FLONASE) 50 MCG/ACT nasal spray Spray 2 sprays into both nostrils daily 16 g 3    letrozole (FEMARA) 2.5 MG tablet Take 1 tablet (2.5 mg) by mouth daily 90 tablet 3    mometasone-formoterol (DULERA) 100-5 MCG/ACT inhaler Inhale 2 puffs into the lungs 2 times daily. (Patient not taking: Reported on 3/4/2025) 13 g 4    order for DME Equipment being ordered: 2 Mastectomy bras and 1 prosthetheses. 2 Piece 0    pantoprazole (PROTONIX) 40 MG EC tablet Take 1 tablet (40 mg) by mouth daily. 90 tablet 3    predniSONE (DELTASONE) 20 MG tablet Take two tablets (= 40mg) each day for 5 (five) days (Patient not taking: Reported on 2/24/2025) 10 tablet 0    traZODone (DESYREL) 50 MG tablet TAKE 1/2 TABLET(25 MG) BY MOUTH EVERY NIGHT AS  NEEDED FOR SLEEP (Patient not taking: Reported on 1/30/2025) 45 tablet 2    VITAMIN D, CHOLECALCIFEROL, PO Take 1,000 Units by mouth daily       No current facility-administered medications for this visit.             Review of Systems:     A 10 point ROS was performed and reviewed. Specific responses to these questions are noted in the HPI.         Physical Exam:   Vitals: There were no vitals taken for this visit.  Constitutional: awake, alert, cooperative, no apparent distress, appears stated age.    Eyes: The sclera are white.  Ears, Nose, Throat: The trachea is midline.  Psychiatric: The patient has a normal affect.  Respiratory: breathing non-labored  Cardiovascular: The extremities are warm and perfused.  Skin: no obvious rashes or lesions.  Musculoskeletal, Neurologic, and Spine:     Lumbar Spine:    Appearance: no gross stepoffs or deformities  Normal gait without assistive devices.  No antalgia / imbalance.    Motor:   L2-3: Hip flexion R 5/5  And L 5/5 strength          L3/4: Knee extension R 5/5 and L 5/5 strength         L4/5: Ankle dorsiflexion R 5/5 L 5/5 and       EHL R 5/5 L 5/5 strength         S1:  Ankle plantarflexion/Peroneal Muscles  R 5/5 and L 5/5 strength    Sensation: intact to light touch L3-S1 distribution BLE        Straight leg raise negative    Neurologic:      REFLEXES Right Left   Patella 2+ 2+   Ankle jerk 2+ 2+   Babinski No upgoing great toe No upgoing great toe   Clonus 0 beats 0 beats     Tenderness to palpation diffusely around the lower lumbar spine worse on the right side paraspinal musculature         Imaging:   We ordered and independently reviewed new radiographs at this clinic visit. The results were discussed with the patient.  Findings include:    Lumbar spine x-rays demonstrate multilevel spondylosis worse at T12-L1 and L1-L2 with disc space narrowing and endplate changes.  No listhesis.    MRI lumbar spine on 2/10/25 was also personally reviewed:   1. No acute  fracture of the lumbar spine.  2. Multifocal metastases of the lumbar spine and visualized portions of the sacrum/pelvis, similar to the CT 1/6/2025. Heterogeneous marrow signal of the lumbar spine and visualized portions of the sacrum/pelvis, compatible with known diffuse metastatic disease. Most conspicuous lesions in the L1 and L3 vertebral bodies, as well as the right iliac bone. Mild disc space narrowing with loss of the intrinsic T2 signal at T12-L1 and L1-L2.  3. Multilevel lumbar spondylosis. No high-grade neuroforaminal or  spinal canal stenosis.  4. Active inflammatory arthropathy of the left L3-4 and right L4-5  facet joints.           Assessment and Plan:   Assessment:  70 year old female with history of metastatic breast cancer and chronic low back pain with known stable metastatic disease     Plan:  Reviewed the patient's history, exam and radiographic findings.  She has known metastatic disease to her spine however this has been stable over time.  All of her pain is mid axial low back pain without any radicular component or weakness/sensory changes in the lower extremities.  Therefore we discussed the most reasonable treatment option would be conservative.  This would consist of continued Tylenol, inflammatory medications, physical therapy, and possibly consideration of injections as she does have degenerative changes in her lumbar spine as well.  She was amenable to this plan.  We will write a formal physical therapy referral and refer as well to the pain clinic.  She will follow-up on an as-needed basis in this clinic.    --  Warren Curry MD  Orthopedic Surgery PGY4     Attending MD (Dr. Jaime Coulter) : I personally performed greater than 50% of the effort related to this patient visit and am responsible for the medical decision making and billing in this case.  I met with the patient, reviewed and verified the history and physical exam of the patient and discussed the patient's  management with the other clinical providers involved in this patient's care including any involved residents or physicians assistants. I also personally reviewed the imaging and I personally formulated the treatment plan and diagnosis in their entirety.  I reviewed the above note and agree with the documented findings and plan of care, which were communicated to the patient.      Jaime Coulter MD

## 2025-03-20 ENCOUNTER — THERAPY VISIT (OUTPATIENT)
Dept: PHYSICAL THERAPY | Facility: CLINIC | Age: 70
End: 2025-03-20
Attending: ORTHOPAEDIC SURGERY
Payer: COMMERCIAL

## 2025-03-20 DIAGNOSIS — M54.16 LUMBAR RADICULOPATHY: ICD-10-CM

## 2025-03-20 DIAGNOSIS — M54.50 LUMBAR PAIN: ICD-10-CM

## 2025-03-20 NOTE — PROGRESS NOTES
PHYSICAL THERAPY EVALUATION  Type of Visit: Evaluation              Subjective         Presenting condition or subjective complaint: Pain in my back  Feels the discomfort all the time. On February 8 she sat down and there was immediate pain in the back. She reports that if she lies down there is no pain. Nothing was fractured at the time. There is also discomfort with walking/up and moving. She started taking a medication that is helping the pain. She feels most of the pain lower/lumbar region. She has a treadmill at home that she walks on 1-2x/day and cooking in the kitchen.     Date of onset: 03/18/25 (date of order)    Relevant medical history: Cancer   Past Medical History:   Diagnosis Date    Breast cancer metastasized to bone (H)     Lymphedema of left upper extremity     Personal history of chemotherapy     S/P radiation therapy     6 Gy to right iliac region in 11/2014 - [facility]     Dates & types of surgery:    Past Surgical History:   Procedure Laterality Date    APPENDECTOMY      BIOPSY  2014    COLONOSCOPY N/A 2/1/2018    Procedure: COLONOSCOPY;  Colonoscopy;  Surgeon: Phillip Rowe MD;  Location:  GI    ESOPHAGOSCOPY, GASTROSCOPY, DUODENOSCOPY (EGD), COMBINED N/A 2/16/2018    Procedure: COMBINED ESOPHAGOSCOPY, GASTROSCOPY, DUODENOSCOPY (EGD), BIOPSY SINGLE OR MULTIPLE;;  Surgeon: Paty Herman MD;  [facility]    ESOPHAGOSCOPY, GASTROSCOPY, DUODENOSCOPY (EGD), COMBINED N/A 2/5/2020    Procedure: ESOPHAGOGASTRODUODENOSCOPY (EGD);  Surgeon: Anthony Alonso MD;  [facility]    INSERT PORT VASCULAR ACCESS Right 9/15/2017    Procedure: INSERT PORT VASCULAR ACCESS;  Central venous chest port placement, right;  Surgeon: Dmitriy Hernandez PA-C;  [facility]    LAPAROSCOPIC CHOLECYSTECTOMY N/A 8/31/2019    Procedure: LAPAROSCOPIC CHOLECYSTECTOMY;  Surgeon: Maldonado Billy MD;  Location: [facility] OR    MASTECTOMY Left 06/27/2014    and lymph node resection     MASTECTOMY SIMPLE BILATERAL      Mastectomy 06/27/2014     Prior diagnostic imaging/testing results: MRI; CT scan; X-ray     Prior therapy history for the same diagnosis, illness or injury: No      Prior Level of Function  Transfers: Independent  Ambulation: Independent  ADL: Independent  IADL:     Living Environment  Social support: With a significant other or spouse   Type of home: Apartment/condo   Stairs to enter the home: Yes 5 Is there a railing: Yes     Ramp: No   Stairs inside the home: Yes 10 Is there a railing: Yes     Help at home: None  Equipment owned:       Employment: No    Hobbies/Interests:      Patient goals for therapy: More activity    Pain assessment: Pain present     Objective   LUMBAR SPINE EVALUATION  PAIN: Pain Level at Rest: 4/10  Pain Level with Use: 6/10  Pain Location: lumbar spine  Pain Quality: Dull and Gnawing  Pain Frequency: constant  POSTURE: Sitting Posture: slumped/leaning back in chair  GAIT:   Weightbearing Status:  normal  Assistive Device(s): None  Gait Deviations: WNL  Limited lumbopelvic rotation B  ROM: AROM WFL  Slight increased discomfort with flexion and extension near end range of comfortable ROM; hip PROM: flexion = WNL B, ER = slight limited B, IR = slight limited B  STRENGTH:  gentle due to metastasis to bone in spine - hip flex: WFL B, hip abd: WFL B    FUNCTIONAL TESTS: Double Leg Squat: Anterior knee translation and upright trunk    Assessment & Plan   CLINICAL IMPRESSIONS  Medical Diagnosis: lumbar pain    Treatment Diagnosis:     Impression/Assessment: Patient is a 70 year old female with low back complaints.  The following significant findings have been identified: Pain, Decreased ROM/flexibility, Decreased joint mobility, Decreased strength, Impaired gait, Impaired muscle performance, and Decreased activity tolerance. These impairments interfere with their ability to perform self care tasks, recreational activities, household chores, household mobility, and  community mobility as compared to previous level of function.     Clinical Decision Making (Complexity):  Clinical Presentation: Stable/Uncomplicated  Clinical Presentation Rationale: based on medical and personal factors listed in PT evaluation  Clinical Decision Making (Complexity): Low complexity    PLAN OF CARE  Treatment Interventions:  Modalities: Dry Needling, E-stim  Interventions: Gait Training, Manual Therapy, Neuromuscular Re-education, Therapeutic Activity, Therapeutic Exercise, Self-Care/Home Management    Long Term Goals     PT Goal 1  Goal Identifier: sitting  Goal Description: patient will be able to sit for 40 min with 50% reduction in pain  Rationale: to maximize safety and independence with performance of ADLs and functional tasks;to maximize safety and independence within the home;to maximize safety and independence with self cares  Target Date: 06/12/25  PT Goal 2  Goal Identifier: ambulation  Goal Description: patient will be able to walk for 30 + min with 50% reduction in symptoms  Rationale: to maximize safety and independence with performance of ADLs and functional tasks;to maximize safety and independence within the home;to maximize safety and independence with self cares  Target Date: 06/12/25      Frequency of Treatment: 1x/wk progressing to 1 x every other week  Duration of Treatment: 2-3 months    Recommended Referrals to Other Professionals: n/a at present  Education Assessment:   Learner/Method: Patient    Risks and benefits of evaluation/treatment have been explained.   Patient/Family/caregiver agrees with Plan of Care.     Evaluation Time:     PT Eval, Low Complexity Minutes (17624): 10     Signing Clinician: Danae Larsen, PT        [facility]                                                                                   OUTPATIENT PHYSICAL THERAPY      PLAN OF TREATMENT FOR OUTPATIENT REHABILITATION   Patient's Last Name, First Name,  Hoda Figueroa YOB: 1955   Provider's Name   [facility]   Medical Record No.  [id number]     Onset Date: 03/18/25 (date of order)  Start of Care Date: 03/20/25     Medical Diagnosis:  lumbar pain      PT Treatment Diagnosis:    Plan of Treatment  Frequency/Duration: 1x/wk progressing to 1 x every other week/ 2-3 months    Certification date from 03/20/25 to 06/12/25         See note for plan of treatment details and functional goals     Danae Larsen, PT                         I CERTIFY THE NEED FOR THESE SERVICES FURNISHED UNDER        THIS PLAN OF TREATMENT AND WHILE UNDER MY CARE     (Physician attestation of this document indicates review and certification of the therapy plan).              Referring Provider:  Jaime Coulter    Initial Assessment  See Epic Evaluation- Start of Care Date: 03/20/25

## 2025-03-25 ENCOUNTER — TELEPHONE (OUTPATIENT)
Dept: RADIATION ONCOLOGY | Facility: CLINIC | Age: 70
End: 2025-03-25
Payer: COMMERCIAL

## 2025-03-25 NOTE — TELEPHONE ENCOUNTER
Called daughter to schedule an appointment with Dr. Burr to review imaging, go over pain, etc. Daughter informed me that her mom was referred to other specialists by Dr. Aguiar and started medication and physical therapy and it has been helping her pain and they feel her pain is well managed now and that they no longer need to come in to see Dr. Burr. I will let Dr. Burr nurse, Tracy know.

## 2025-03-26 ENCOUNTER — THERAPY VISIT (OUTPATIENT)
Dept: PHYSICAL THERAPY | Facility: CLINIC | Age: 70
End: 2025-03-26
Attending: ORTHOPAEDIC SURGERY
Payer: COMMERCIAL

## 2025-03-26 ENCOUNTER — TELEPHONE (OUTPATIENT)
Dept: ONCOLOGY | Facility: CLINIC | Age: 70
End: 2025-03-26

## 2025-03-26 DIAGNOSIS — M54.50 LUMBAR PAIN: Primary | ICD-10-CM

## 2025-03-26 PROCEDURE — 97110 THERAPEUTIC EXERCISES: CPT | Mod: GP

## 2025-03-26 NOTE — PROGRESS NOTES
CLINICAL NUTRITION SERVICES     Reason for Contact: Questions from Oncology Distress Screening  1. How concerned are you about your ability to eat? :  8  2. How concerned are you about unintended weight loss or your current weight? : 8    Action: RD called patient indicating reason for phone call. Left a VM with a return call back number.     Follow up: Wait for a return phone call.    Shaina Vizcarra RD,   [phone]

## 2025-03-27 ENCOUNTER — TELEPHONE (OUTPATIENT)
Dept: ONCOLOGY | Facility: CLINIC | Age: 70
End: 2025-03-27
Payer: COMMERCIAL

## 2025-03-27 DIAGNOSIS — C50.912 MALIGNANT NEOPLASM OF LEFT FEMALE BREAST, UNSPECIFIED ESTROGEN RECEPTOR STATUS, UNSPECIFIED SITE OF BREAST (H): Primary | ICD-10-CM

## 2025-03-27 NOTE — TELEPHONE ENCOUNTER
I called Dr. León and discussed the lumbar pain and whether radiofrequency ablation could be helpful or consider a trial of radiation and radiofrequency ablation.    I called Hoda and left a message to call me.     Lisandro Aguiar MD

## 2025-04-03 ENCOUNTER — INFUSION THERAPY VISIT (OUTPATIENT)
Dept: ONCOLOGY | Facility: CLINIC | Age: 70
End: 2025-04-03
Attending: INTERNAL MEDICINE
Payer: COMMERCIAL

## 2025-04-03 ENCOUNTER — APPOINTMENT (OUTPATIENT)
Dept: LAB | Facility: CLINIC | Age: 70
End: 2025-04-03
Attending: INTERNAL MEDICINE
Payer: COMMERCIAL

## 2025-04-03 VITALS
BODY MASS INDEX: 33.07 KG/M2 | WEIGHT: 186.7 LBS | OXYGEN SATURATION: 94 % | TEMPERATURE: 97.9 F | RESPIRATION RATE: 16 BRPM | DIASTOLIC BLOOD PRESSURE: 81 MMHG | HEART RATE: 83 BPM | SYSTOLIC BLOOD PRESSURE: 126 MMHG

## 2025-04-03 DIAGNOSIS — C50.412 CARCINOMA OF UPPER-OUTER QUADRANT OF LEFT BREAST IN FEMALE, ESTROGEN RECEPTOR POSITIVE (H): ICD-10-CM

## 2025-04-03 DIAGNOSIS — Z17.0 CARCINOMA OF UPPER-OUTER QUADRANT OF LEFT BREAST IN FEMALE, ESTROGEN RECEPTOR POSITIVE (H): ICD-10-CM

## 2025-04-03 DIAGNOSIS — C50.912 MALIGNANT NEOPLASM OF LEFT FEMALE BREAST, UNSPECIFIED ESTROGEN RECEPTOR STATUS, UNSPECIFIED SITE OF BREAST (H): Primary | ICD-10-CM

## 2025-04-03 LAB
ALBUMIN SERPL BCG-MCNC: 4.2 G/DL (ref 3.5–5.2)
ALP SERPL-CCNC: 59 U/L (ref 40–150)
ALT SERPL W P-5'-P-CCNC: 78 U/L (ref 0–50)
ANION GAP SERPL CALCULATED.3IONS-SCNC: 8 MMOL/L (ref 7–15)
AST SERPL W P-5'-P-CCNC: 72 U/L (ref 0–45)
BASOPHILS # BLD AUTO: 0 10E3/UL (ref 0–0.2)
BASOPHILS NFR BLD AUTO: 1 %
BILIRUB SERPL-MCNC: 0.3 MG/DL
BUN SERPL-MCNC: 15.6 MG/DL (ref 8–23)
CALCIUM SERPL-MCNC: 9 MG/DL (ref 8.8–10.4)
CEA SERPL-MCNC: 1.1 NG/ML
CHLORIDE SERPL-SCNC: 106 MMOL/L (ref 98–107)
CREAT SERPL-MCNC: 0.82 MG/DL (ref 0.51–0.95)
EGFRCR SERPLBLD CKD-EPI 2021: 77 ML/MIN/1.73M2
EOSINOPHIL # BLD AUTO: 0.3 10E3/UL (ref 0–0.7)
EOSINOPHIL NFR BLD AUTO: 4 %
ERYTHROCYTE [DISTWIDTH] IN BLOOD BY AUTOMATED COUNT: 13.7 % (ref 10–15)
GLUCOSE SERPL-MCNC: 101 MG/DL (ref 70–99)
HCO3 SERPL-SCNC: 28 MMOL/L (ref 22–29)
HCT VFR BLD AUTO: 41 % (ref 35–47)
HGB BLD-MCNC: 12.7 G/DL (ref 11.7–15.7)
IMM GRANULOCYTES # BLD: 0 10E3/UL
IMM GRANULOCYTES NFR BLD: 0 %
LYMPHOCYTES # BLD AUTO: 2.8 10E3/UL (ref 0.8–5.3)
LYMPHOCYTES NFR BLD AUTO: 41 %
MCH RBC QN AUTO: 28.9 PG (ref 26.5–33)
MCHC RBC AUTO-ENTMCNC: 31 G/DL (ref 31.5–36.5)
MCV RBC AUTO: 93 FL (ref 78–100)
MONOCYTES # BLD AUTO: 0.5 10E3/UL (ref 0–1.3)
MONOCYTES NFR BLD AUTO: 7 %
NEUTROPHILS # BLD AUTO: 3.2 10E3/UL (ref 1.6–8.3)
NEUTROPHILS NFR BLD AUTO: 47 %
NRBC # BLD AUTO: 0 10E3/UL
NRBC BLD AUTO-RTO: 0 /100
PLATELET # BLD AUTO: 179 10E3/UL (ref 150–450)
POTASSIUM SERPL-SCNC: 4.3 MMOL/L (ref 3.4–5.3)
PROT SERPL-MCNC: 7.8 G/DL (ref 6.4–8.3)
RBC # BLD AUTO: 4.39 10E6/UL (ref 3.8–5.2)
SODIUM SERPL-SCNC: 142 MMOL/L (ref 135–145)
WBC # BLD AUTO: 6.7 10E3/UL (ref 4–11)

## 2025-04-03 PROCEDURE — 85004 AUTOMATED DIFF WBC COUNT: CPT

## 2025-04-03 PROCEDURE — 82378 CARCINOEMBRYONIC ANTIGEN: CPT

## 2025-04-03 PROCEDURE — 250N000011 HC RX IP 250 OP 636: Performed by: INTERNAL MEDICINE

## 2025-04-03 PROCEDURE — 86300 IMMUNOASSAY TUMOR CA 15-3: CPT

## 2025-04-03 PROCEDURE — 36591 DRAW BLOOD OFF VENOUS DEVICE: CPT

## 2025-04-03 PROCEDURE — 85018 HEMOGLOBIN: CPT

## 2025-04-03 PROCEDURE — 258N000003 HC RX IP 258 OP 636: Performed by: INTERNAL MEDICINE

## 2025-04-03 PROCEDURE — 82310 ASSAY OF CALCIUM: CPT

## 2025-04-03 PROCEDURE — 82040 ASSAY OF SERUM ALBUMIN: CPT

## 2025-04-03 RX ORDER — HEPARIN SODIUM (PORCINE) LOCK FLUSH IV SOLN 100 UNIT/ML 100 UNIT/ML
5 SOLUTION INTRAVENOUS ONCE
Status: COMPLETED | OUTPATIENT
Start: 2025-04-03 | End: 2025-04-03

## 2025-04-03 RX ORDER — HEPARIN SODIUM (PORCINE) LOCK FLUSH IV SOLN 100 UNIT/ML 100 UNIT/ML
5 SOLUTION INTRAVENOUS
Status: DISCONTINUED | OUTPATIENT
Start: 2025-04-03 | End: 2025-04-03 | Stop reason: HOSPADM

## 2025-04-03 RX ADMIN — HEPARIN 5 ML: 100 SYRINGE at 12:10

## 2025-04-03 RX ADMIN — SODIUM CHLORIDE 500 MG: 0.9 INJECTION, SOLUTION INTRAVENOUS at 11:38

## 2025-04-03 RX ADMIN — Medication 5 ML: at 10:11

## 2025-04-03 ASSESSMENT — PAIN SCALES - GENERAL: PAINLEVEL_OUTOF10: SEVERE PAIN (8)

## 2025-04-03 NOTE — PROGRESS NOTES
Infusion Nursing Note:  Hoda Brush presents today for Cycle 4 Day 1 trastuzumab-qyyp (TRAZIMERA)    Patient seen by provider today: No   present during visit today: Not Applicable.    Note: Hoda comes in doing well with no new acute concerns. She does have back pain rated 8/10 but declines intervention for this. She does not attest to any SOB/chest tightness/signs of infection/dizziness/sensation changes/bruising/swelling/diarrhea/constipation/nausea/urinary issues/vision or hearing changes/fatigue.      Intravenous Access:  Implanted Port.    Treatment Conditions:  Lab Results   Component Value Date    HGB 12.7 04/03/2025    WBC 6.7 04/03/2025    ANEU 3.1 06/22/2021    ANEUTAUTO 3.2 04/03/2025     04/03/2025        Lab Results   Component Value Date     04/03/2025    POTASSIUM 4.3 04/03/2025    CR 0.82 04/03/2025    TAYLER 9.0 04/03/2025    BILITOTAL 0.3 04/03/2025    ALBUMIN 4.2 04/03/2025    ALT 78 (H) 04/03/2025    AST 72 (H) 04/03/2025       Results reviewed, labs MET treatment parameters, ok to proceed with treatment.  ECHO 3/13/25 EF 55-60%    Post Infusion Assessment:  Patient tolerated infusion without incident.  Blood return noted pre and post infusion.  Site patent and intact, free from redness, edema or discomfort.  No evidence of extravasations.  Access discontinued per protocol.       Discharge Plan:   Patient declined prescription refills.  Discharge instructions reviewed with: Patient.  Patient and/or family verbalized understanding of discharge instructions and all questions answered.  AVS to patient via [employer].  Patient will return 4/24 for next appointment.   Patient discharged in stable condition accompanied by: self.  Departure Mode: Ambulatory.      Delia Sears RN

## 2025-04-03 NOTE — NURSING NOTE
Chief Complaint   Patient presents with    Chemotherapy     Cycle 4 Day 1 trastuzumab-qyyp (TRAZIMERA)      Port Draw     Labs drawn via port by RN in lab.     Port accessed with 20g flat needle by RN, labs collected, line flushed with saline and heparin.  Vitals taken. Pt checked in for appointment(s).     Jeanna SHERIFF RN PHN BSN  BMT/Oncology Lab

## 2025-04-03 NOTE — PATIENT INSTRUCTIONS
[facility] Triage and after hours / weekends / holidays:  [phone]    Please call the triage or after hours line if you experience a temperature greater than or equal to 100.4, shaking chills, have uncontrolled nausea, vomiting and/or diarrhea, dizziness, shortness of breath, chest pain, bleeding, unexplained bruising, or if you have any other new/concerning symptoms, questions or concerns.      If you are having any concerning symptoms or wish to speak to a provider before your next infusion visit, please call triage to notify them so we can adequately serve you.     If you need a refill on a narcotic prescription or other medication, please call before your infusion appointment.

## 2025-04-23 RX ORDER — ALBUTEROL SULFATE 0.83 MG/ML
2.5 SOLUTION RESPIRATORY (INHALATION)
Status: CANCELLED | OUTPATIENT
Start: 2025-04-24

## 2025-04-23 RX ORDER — ALBUTEROL SULFATE 90 UG/1
1-2 INHALANT RESPIRATORY (INHALATION)
Start: 2025-05-15

## 2025-04-23 RX ORDER — DIPHENHYDRAMINE HYDROCHLORIDE 50 MG/ML
25 INJECTION, SOLUTION INTRAMUSCULAR; INTRAVENOUS
Start: 2025-05-15

## 2025-04-23 RX ORDER — DIPHENHYDRAMINE HYDROCHLORIDE 50 MG/ML
50 INJECTION, SOLUTION INTRAMUSCULAR; INTRAVENOUS
Start: 2025-05-15

## 2025-04-23 RX ORDER — DIPHENHYDRAMINE HYDROCHLORIDE 50 MG/ML
25 INJECTION, SOLUTION INTRAMUSCULAR; INTRAVENOUS
Status: CANCELLED
Start: 2025-04-24

## 2025-04-23 RX ORDER — HEPARIN SODIUM,PORCINE 10 UNIT/ML
5-20 VIAL (ML) INTRAVENOUS DAILY PRN
Status: CANCELLED | OUTPATIENT
Start: 2025-04-24

## 2025-04-23 RX ORDER — DIPHENHYDRAMINE HCL 25 MG
50 CAPSULE ORAL
Start: 2025-05-15

## 2025-04-23 RX ORDER — HEPARIN SODIUM,PORCINE 10 UNIT/ML
5-20 VIAL (ML) INTRAVENOUS DAILY PRN
OUTPATIENT
Start: 2025-05-15

## 2025-04-23 RX ORDER — DIPHENHYDRAMINE HCL 25 MG
50 CAPSULE ORAL
Status: CANCELLED
Start: 2025-04-24

## 2025-04-23 RX ORDER — MEPERIDINE HYDROCHLORIDE 25 MG/ML
25 INJECTION INTRAMUSCULAR; INTRAVENOUS; SUBCUTANEOUS
Status: CANCELLED | OUTPATIENT
Start: 2025-04-24

## 2025-04-23 RX ORDER — METHYLPREDNISOLONE SODIUM SUCCINATE 40 MG/ML
40 INJECTION INTRAMUSCULAR; INTRAVENOUS
Status: CANCELLED
Start: 2025-04-24

## 2025-04-23 RX ORDER — ALBUTEROL SULFATE 0.83 MG/ML
2.5 SOLUTION RESPIRATORY (INHALATION)
OUTPATIENT
Start: 2025-05-15

## 2025-04-23 RX ORDER — LORAZEPAM 2 MG/ML
0.5 INJECTION INTRAMUSCULAR EVERY 4 HOURS PRN
OUTPATIENT
Start: 2025-05-15

## 2025-04-23 RX ORDER — MEPERIDINE HYDROCHLORIDE 25 MG/ML
25 INJECTION INTRAMUSCULAR; INTRAVENOUS; SUBCUTANEOUS
OUTPATIENT
Start: 2025-05-15

## 2025-04-23 RX ORDER — EPINEPHRINE 1 MG/ML
0.3 INJECTION, SOLUTION INTRAMUSCULAR; SUBCUTANEOUS EVERY 5 MIN PRN
Status: CANCELLED | OUTPATIENT
Start: 2025-04-24

## 2025-04-23 RX ORDER — EPINEPHRINE 1 MG/ML
0.3 INJECTION, SOLUTION INTRAMUSCULAR; SUBCUTANEOUS EVERY 5 MIN PRN
OUTPATIENT
Start: 2025-05-15

## 2025-04-23 RX ORDER — DIPHENHYDRAMINE HYDROCHLORIDE 50 MG/ML
50 INJECTION, SOLUTION INTRAMUSCULAR; INTRAVENOUS
Status: CANCELLED
Start: 2025-04-24

## 2025-04-23 RX ORDER — ALBUTEROL SULFATE 90 UG/1
1-2 INHALANT RESPIRATORY (INHALATION)
Status: CANCELLED
Start: 2025-04-24

## 2025-04-23 RX ORDER — ACETAMINOPHEN 325 MG/1
650 TABLET ORAL
Status: CANCELLED
Start: 2025-04-24

## 2025-04-23 RX ORDER — HEPARIN SODIUM (PORCINE) LOCK FLUSH IV SOLN 100 UNIT/ML 100 UNIT/ML
5 SOLUTION INTRAVENOUS
Status: CANCELLED | OUTPATIENT
Start: 2025-04-24

## 2025-04-23 RX ORDER — HEPARIN SODIUM (PORCINE) LOCK FLUSH IV SOLN 100 UNIT/ML 100 UNIT/ML
5 SOLUTION INTRAVENOUS
OUTPATIENT
Start: 2025-05-15

## 2025-04-23 RX ORDER — LORAZEPAM 2 MG/ML
0.5 INJECTION INTRAMUSCULAR EVERY 4 HOURS PRN
Status: CANCELLED | OUTPATIENT
Start: 2025-04-24

## 2025-04-23 RX ORDER — METHYLPREDNISOLONE SODIUM SUCCINATE 40 MG/ML
40 INJECTION INTRAMUSCULAR; INTRAVENOUS
Start: 2025-05-15

## 2025-04-23 RX ORDER — ACETAMINOPHEN 325 MG/1
650 TABLET ORAL
Start: 2025-05-15

## 2025-04-23 NOTE — PROGRESS NOTES
Infusion Nursing Note:  Hoda Brush presents today for Cycle 89, day 1 Herceptin and Xgeva.    Patient seen by provider today: No  Pt declined , waiver signed.    Note: Patient presents to clinic today feeling well with no questions.  Pt did not request or require any intervention for pain today.  Pt denied any changes in allergies or medications in past two days.  Pt denies any jaw pain or upcoming dental procedures.  KARISSA note indicates correct to continue with Q3 month Xgeva.     Intravenous Access:  Implanted Port.    Treatment Conditions:  Lab Results   Component Value Date    HGB 12.0 11/17/2022    WBC 6.9 11/17/2022    ANEU 3.1 06/22/2021    ANEUTAUTO 3.6 11/17/2022     11/17/2022      Lab Results   Component Value Date     11/17/2022    POTASSIUM 4.0 11/17/2022    CR 0.81 11/17/2022    TAYLER 8.9 11/17/2022    BILITOTAL 0.4 11/17/2022    ALBUMIN 4.0 11/17/2022    ALT 33 11/17/2022    AST 35 11/17/2022     Results reviewed, labs MET treatment parameters, ok to proceed with treatment.    Post Infusion Assessment:  Patient tolerated infusion without incident.  Patient tolerated injection without incident to RUE.  Blood return noted pre and post infusion.  Site patent and intact, free from redness, edema or discomfort.  No evidence of extravasations.  Access discontinued per protocol.    Discharge Plan:   Patient declined prescription refills.  Discharge instructions reviewed with: Patient.  Patient and/or family verbalized understanding of discharge instructions and all questions answered.  AVS to patient via [employer].  Patient will return 12/7/2022 for next appointment.   Patient discharged in stable condition accompanied by: self.  Departure Mode: Ambulatory.    Steffi Oh RN                     Liquid Nitrogen Care Instructions    Freezing with liquid nitrogen is accompanied by a stinging, burning sensation which usually lasts from 15 minutes up to several hours.     It is normal for a blister to form at the treatment site, though you may have no blistering.  Occasionally there will be a blood blister.  The blister normally breaks in the first few days, but on the fingers it can last longer.      The blister may form into a dry crust.  The crust should peel off in 2-4 weeks.  There may be some mild redness after the crust falls off, but this should fade with time.     To care for the treated areas, follow these steps:    1. Gently wash, shampoo or shower the area once or twice daily, but the area should not be rubbed as this can irritate it.  Pat dry with a soft towel.     2. Apply Vaseline to the treated area at least twice daily.  You should apply Vaseline as often as needed to keep the areas moist.  This helps the spots heal.     3. You may apply a bandage if you wish, but this is usually not required unless the area is very crusty.      4. Continue care for the treated area following steps 1-3 until the area is completely healed.     Always wash your hands before touching or caring for the treated areas.      You can apply cosmetics to the area once the crusting or scabbing has healed.     Possible side effects of liquid nitrogen treatment include:  Permanent pigment change (lighter or darker skin), scar, blister, crusting, discomfort and infection.      Please call if you have any questions:    Dr. Dayanara Medrano MD  [facility]  [phone]     no

## 2025-04-24 ENCOUNTER — APPOINTMENT (OUTPATIENT)
Dept: LAB | Facility: CLINIC | Age: 70
End: 2025-04-24
Attending: INTERNAL MEDICINE
Payer: COMMERCIAL

## 2025-04-24 ENCOUNTER — INFUSION THERAPY VISIT (OUTPATIENT)
Dept: ONCOLOGY | Facility: CLINIC | Age: 70
End: 2025-04-24
Attending: INTERNAL MEDICINE
Payer: COMMERCIAL

## 2025-04-24 VITALS
WEIGHT: 181.3 LBS | TEMPERATURE: 98.1 F | SYSTOLIC BLOOD PRESSURE: 116 MMHG | HEART RATE: 78 BPM | DIASTOLIC BLOOD PRESSURE: 76 MMHG | OXYGEN SATURATION: 94 % | RESPIRATION RATE: 16 BRPM | BODY MASS INDEX: 32.12 KG/M2

## 2025-04-24 DIAGNOSIS — C50.412 CARCINOMA OF UPPER-OUTER QUADRANT OF LEFT BREAST IN FEMALE, ESTROGEN RECEPTOR POSITIVE (H): ICD-10-CM

## 2025-04-24 DIAGNOSIS — C50.912 MALIGNANT NEOPLASM OF LEFT FEMALE BREAST, UNSPECIFIED ESTROGEN RECEPTOR STATUS, UNSPECIFIED SITE OF BREAST (H): Primary | ICD-10-CM

## 2025-04-24 DIAGNOSIS — Z17.0 CARCINOMA OF UPPER-OUTER QUADRANT OF LEFT BREAST IN FEMALE, ESTROGEN RECEPTOR POSITIVE (H): ICD-10-CM

## 2025-04-24 LAB
ALBUMIN SERPL BCG-MCNC: 4 G/DL (ref 3.5–5.2)
ALP SERPL-CCNC: 56 U/L (ref 40–150)
ALT SERPL W P-5'-P-CCNC: 61 U/L (ref 0–50)
ANION GAP SERPL CALCULATED.3IONS-SCNC: 6 MMOL/L (ref 7–15)
AST SERPL W P-5'-P-CCNC: 60 U/L (ref 0–45)
BASOPHILS # BLD AUTO: 0 10E3/UL (ref 0–0.2)
BASOPHILS NFR BLD AUTO: 0 %
BILIRUB SERPL-MCNC: 0.3 MG/DL
BUN SERPL-MCNC: 15.2 MG/DL (ref 8–23)
CALCIUM SERPL-MCNC: 8.8 MG/DL (ref 8.8–10.4)
CEA SERPL-MCNC: 1.2 NG/ML
CHLORIDE SERPL-SCNC: 106 MMOL/L (ref 98–107)
CREAT SERPL-MCNC: 0.75 MG/DL (ref 0.51–0.95)
EGFRCR SERPLBLD CKD-EPI 2021: 86 ML/MIN/1.73M2
EOSINOPHIL # BLD AUTO: 0.2 10E3/UL (ref 0–0.7)
EOSINOPHIL NFR BLD AUTO: 3 %
ERYTHROCYTE [DISTWIDTH] IN BLOOD BY AUTOMATED COUNT: 13.6 % (ref 10–15)
GLUCOSE SERPL-MCNC: 87 MG/DL (ref 70–99)
HCO3 SERPL-SCNC: 29 MMOL/L (ref 22–29)
HCT VFR BLD AUTO: 39 % (ref 35–47)
HGB BLD-MCNC: 12 G/DL (ref 11.7–15.7)
IMM GRANULOCYTES # BLD: 0 10E3/UL
IMM GRANULOCYTES NFR BLD: 0 %
LYMPHOCYTES # BLD AUTO: 3 10E3/UL (ref 0.8–5.3)
LYMPHOCYTES NFR BLD AUTO: 43 %
MCH RBC QN AUTO: 28.7 PG (ref 26.5–33)
MCHC RBC AUTO-ENTMCNC: 30.8 G/DL (ref 31.5–36.5)
MCV RBC AUTO: 93 FL (ref 78–100)
MONOCYTES # BLD AUTO: 0.5 10E3/UL (ref 0–1.3)
MONOCYTES NFR BLD AUTO: 8 %
NEUTROPHILS # BLD AUTO: 3.2 10E3/UL (ref 1.6–8.3)
NEUTROPHILS NFR BLD AUTO: 46 %
NRBC # BLD AUTO: 0 10E3/UL
NRBC BLD AUTO-RTO: 0 /100
PLATELET # BLD AUTO: 197 10E3/UL (ref 150–450)
POTASSIUM SERPL-SCNC: 4.1 MMOL/L (ref 3.4–5.3)
PROT SERPL-MCNC: 7.4 G/DL (ref 6.4–8.3)
RBC # BLD AUTO: 4.18 10E6/UL (ref 3.8–5.2)
SODIUM SERPL-SCNC: 141 MMOL/L (ref 135–145)
WBC # BLD AUTO: 6.9 10E3/UL (ref 4–11)

## 2025-04-24 PROCEDURE — 85004 AUTOMATED DIFF WBC COUNT: CPT

## 2025-04-24 PROCEDURE — 36591 DRAW BLOOD OFF VENOUS DEVICE: CPT

## 2025-04-24 PROCEDURE — 250N000011 HC RX IP 250 OP 636: Performed by: INTERNAL MEDICINE

## 2025-04-24 PROCEDURE — 258N000003 HC RX IP 258 OP 636: Performed by: INTERNAL MEDICINE

## 2025-04-24 PROCEDURE — 84155 ASSAY OF PROTEIN SERUM: CPT

## 2025-04-24 PROCEDURE — 82947 ASSAY GLUCOSE BLOOD QUANT: CPT

## 2025-04-24 PROCEDURE — 82378 CARCINOEMBRYONIC ANTIGEN: CPT

## 2025-04-24 RX ORDER — HEPARIN SODIUM (PORCINE) LOCK FLUSH IV SOLN 100 UNIT/ML 100 UNIT/ML
5 SOLUTION INTRAVENOUS ONCE
Status: COMPLETED | OUTPATIENT
Start: 2025-04-24 | End: 2025-04-24

## 2025-04-24 RX ORDER — HEPARIN SODIUM (PORCINE) LOCK FLUSH IV SOLN 100 UNIT/ML 100 UNIT/ML
5 SOLUTION INTRAVENOUS
Status: DISCONTINUED | OUTPATIENT
Start: 2025-04-24 | End: 2025-04-24 | Stop reason: HOSPADM

## 2025-04-24 RX ADMIN — HEPARIN 5 ML: 100 SYRINGE at 13:19

## 2025-04-24 RX ADMIN — SODIUM CHLORIDE 500 MG: 0.9 INJECTION, SOLUTION INTRAVENOUS at 12:48

## 2025-04-24 RX ADMIN — HEPARIN 5 ML: 100 SYRINGE at 11:58

## 2025-04-24 ASSESSMENT — PAIN SCALES - GENERAL: PAINLEVEL_OUTOF10: NO PAIN (0)

## 2025-04-24 NOTE — PROGRESS NOTES
Infusion Nursing Note:  Hoda Brush presents today for Cycle 5 Day 1 Trastuzumab-qyyp (Trazimera).    Patient seen by provider today: No   present during visit today: Patient refused  services.    Note: Patient arrives feeling well. No new changes or acute concerns. Denies pain.      Intravenous Access:  Implanted Port.    Treatment Conditions:  Lab Results   Component Value Date    HGB 12.0 04/24/2025    WBC 6.9 04/24/2025    ANEU 3.2 04/24/2025     04/24/2025        Lab Results   Component Value Date     04/24/2025    POTASSIUM 4.1 04/24/2025    CR 0.75 04/24/2025    TAYLER 8.8 04/24/2025    BILITOTAL 0.3 04/24/2025    ALBUMIN 4.0 04/24/2025    ALT 61 (H) 04/24/2025    AST 60 (H) 04/24/2025       Results reviewed, labs MET treatment parameters, ok to proceed with treatment.  ECHO completed 3/13/25 EF 55-60%.      Post Infusion Assessment:  Patient tolerated infusion without incident.  Blood return noted pre and post infusion.  Site patent and intact, free from redness, edema or discomfort.  Access discontinued per protocol.       Discharge Plan:   Patient declined prescription refills.  Discharge instructions reviewed with: Patient.  Patient and/or family verbalized understanding of discharge instructions and all questions answered.  AVS to patient via [employer].  Patient will return 5/15 for next appointment.   Patient discharged in stable condition accompanied by: self.  Departure Mode: Ambulatory.      Emily Cullen RN

## 2025-04-24 NOTE — PATIENT INSTRUCTIONS
Contact Numbers    [facility] [place]for Scheduling/Triage/After Hours Nurse Line): [phone]    Please call the [facility] nurse triage or the after hours nurse line if you experience a temperature greater than or equal to 100.4, shaking chills, have uncontrolled nausea, vomiting and/or diarrhea, dizziness, lightheadedness, shortness of breath, chest pain, bleeding, unexplained bruising, or if you have any other new/concerning symptoms, questions or concerns.     If you are having any concerning symptoms or wish to speak to a provider before your next infusion visit, please call your care coordinator or triage to notify them so we can adequately serve you.     If you need any refills on medications (narcotics or other medications), please call before your infusion appointment.      April 2025 Sunday Monday Tuesday Wednesday Thursday Friday Saturday             1     2     3    LAB CENTRAL   9:45 AM   (15 min.)   [facility] LAB DRAW   [facility]    ONC INFUSION 1 HR (60 MIN)  12:00 PM   (60 min.)    ONC INFUSION NURSE   [facility] 4     5       6     7     8     9     10     11     12       13     14     15     16     17     18     19       20     21     22     23     24    LAB CENTRAL  11:30 AM   (15 min.)   [facility] LAB DRAW   [facility]    ONC INFUSION 1 HR (60 MIN)  12:00 PM   (60 min.)    ONC INFUSION NURSE   [facility] 25     26       27     28     29     30                                May 2025      [place]ak[place] Monday Tuesday Wednesday Thursday Friday Saturday                       1     2     3       4     5     6    SPINE TREATMENT  12:50 PM   (40 min.)   Danae Larsen, PT   [facility] [place] 7     8     9     10       11     12     13     14    CT CHEST/ABDOMEN/PELVIS W   3:30 PM   (20 min.)   SHCT1   [facility]  15    LAB CENTRAL   9:15 AM   (15 min.)    [facility] DRAW   [facility]    RETURN CCSL   9:25 AM   (30 min.)   Lisandro Aguiar MD   [facility]    ONC INFUSION 1 HR (60 MIN)  10:30 AM   (60 min.)    ONC INFUSION NURSE   [facility] 16    ANNUAL WELLNESS  10:40 AM   (30 min.)   Laury Dee MD   [facility] 17       18     19     20     21     22     23     24       25     26     27    SPINE TREATMENT  12:50 PM   (40 min.)   Danae Larsen PT   [facility] [place] 28     29     30     31                     Lab Results:  Recent Results (from the past 12 hours)   COMPREHENSIVE METABOLIC PANEL    Collection Time: 04/24/25 11:54 AM   Result Value Ref Range    Sodium 141 135 - 145 mmol/L    Potassium 4.1 3.4 - 5.3 mmol/L    Carbon Dioxide (CO2) 29 22 - 29 mmol/L    Anion Gap 6 (L) 7 - 15 mmol/L    Urea Nitrogen 15.2 8.0 - 23.0 mg/dL    Creatinine 0.75 0.51 - 0.95 mg/dL    GFR Estimate 86 >60 mL/min/1.73m2    Calcium 8.8 8.8 - 10.4 mg/dL    Chloride 106 98 - 107 mmol/L    Glucose 87 70 - 99 mg/dL    Alkaline Phosphatase 56 40 - 150 U/L    AST 60 (H) 0 - 45 U/L    ALT 61 (H) 0 - 50 U/L    Protein Total 7.4 6.4 - 8.3 g/dL    Albumin 4.0 3.5 - 5.2 g/dL    Bilirubin Total 0.3 <=1.2 mg/dL   CBC with platelets and differential    Collection Time: 04/24/25 11:54 AM   Result Value Ref Range    WBC Count 6.9 4.0 - 11.0 10e3/uL    RBC Count 4.18 3.80 - 5.20 10e6/uL    Hemoglobin 12.0 11.7 - 15.7 g/dL    Hematocrit 39.0 35.0 - 47.0 %    MCV 93 78 - 100 fL    MCH 28.7 26.5 - 33.0 pg    MCHC 30.8 (L) 31.5 - 36.5 g/dL    RDW 13.6 10.0 - 15.0 %    Platelet Count 197 150 - 450 10e3/uL    % Neutrophils 46 %    % Lymphocytes 43 %    % Monocytes 8 %    % Eosinophils 3 %    % Basophils 0 %    % Immature Granulocytes 0 %    NRBCs per 100 WBC 0 <1 /100    Absolute Neutrophils 3.2 1.6 - 8.3  10e3/uL    Absolute Lymphocytes 3.0 0.8 - 5.3 10e3/uL    Absolute Monocytes 0.5 0.0 - 1.3 10e3/uL    Absolute Eosinophils 0.2 0.0 - 0.7 10e3/uL    Absolute Basophils 0.0 0.0 - 0.2 10e3/uL    Absolute Immature Granulocytes 0.0 <=0.4 10e3/uL    Absolute NRBCs 0.0 10e3/uL

## 2025-04-28 ENCOUNTER — TELEPHONE (OUTPATIENT)
Dept: MEDSURG UNIT | Facility: CLINIC | Age: 70
End: 2025-04-28
Payer: COMMERCIAL

## 2025-05-05 ENCOUNTER — MYC MEDICAL ADVICE (OUTPATIENT)
Dept: FAMILY MEDICINE | Facility: CLINIC | Age: 70
End: 2025-05-05
Payer: COMMERCIAL

## 2025-05-06 ASSESSMENT — ASTHMA QUESTIONNAIRES
QUESTION_4 LAST FOUR WEEKS HOW OFTEN HAVE YOU USED YOUR RESCUE INHALER OR NEBULIZER MEDICATION (SUCH AS ALBUTEROL): NOT AT ALL
ACT_TOTALSCORE: 24
QUESTION_1 LAST FOUR WEEKS HOW MUCH OF THE TIME DID YOUR ASTHMA KEEP YOU FROM GETTING AS MUCH DONE AT WORK, SCHOOL OR AT HOME: NONE OF THE TIME
QUESTION_5 LAST FOUR WEEKS HOW WOULD YOU RATE YOUR ASTHMA CONTROL: WELL CONTROLLED
QUESTION_2 LAST FOUR WEEKS HOW OFTEN HAVE YOU HAD SHORTNESS OF BREATH: NOT AT ALL
QUESTION_3 LAST FOUR WEEKS HOW OFTEN DID YOUR ASTHMA SYMPTOMS (WHEEZING, COUGHING, SHORTNESS OF BREATH, CHEST TIGHTNESS OR PAIN) WAKE YOU UP AT NIGHT OR EARLIER THAN USUAL IN THE MORNING: NOT AT ALL

## 2025-05-06 NOTE — TELEPHONE ENCOUNTER
Please schedule team appointment for tomorrow or day after   [facility] is symptoms get worse     Dr.Nasima Luiz MD

## 2025-05-07 ENCOUNTER — OFFICE VISIT (OUTPATIENT)
Dept: FAMILY MEDICINE | Facility: CLINIC | Age: 70
End: 2025-05-07
Payer: COMMERCIAL

## 2025-05-07 VITALS
SYSTOLIC BLOOD PRESSURE: 118 MMHG | BODY MASS INDEX: 32.07 KG/M2 | DIASTOLIC BLOOD PRESSURE: 78 MMHG | HEART RATE: 84 BPM | OXYGEN SATURATION: 94 % | RESPIRATION RATE: 16 BRPM | WEIGHT: 181 LBS | HEIGHT: 63 IN | TEMPERATURE: 98.3 F

## 2025-05-07 DIAGNOSIS — R60.0 EDEMA OF LEFT UPPER EXTREMITY: Primary | ICD-10-CM

## 2025-05-07 PROCEDURE — 3074F SYST BP LT 130 MM HG: CPT | Performed by: PHYSICIAN ASSISTANT

## 2025-05-07 PROCEDURE — 1126F AMNT PAIN NOTED NONE PRSNT: CPT | Performed by: PHYSICIAN ASSISTANT

## 2025-05-07 PROCEDURE — 99213 OFFICE O/P EST LOW 20 MIN: CPT | Performed by: PHYSICIAN ASSISTANT

## 2025-05-07 PROCEDURE — 3078F DIAST BP <80 MM HG: CPT | Performed by: PHYSICIAN ASSISTANT

## 2025-05-07 ASSESSMENT — PAIN SCALES - GENERAL: PAINLEVEL_OUTOF10: NO PAIN (0)

## 2025-05-07 NOTE — PATIENT INSTRUCTIONS
Elevate left arm as able    Ultrasound today    Let me know if rash returns or if any redness, increased pain, swelling

## 2025-05-07 NOTE — PROGRESS NOTES
"Assessment and Plan:     (R60.0) Edema of left upper extremity  (primary encounter diagnosis)  Comment: onset 4 days ago, atraumatic, s/p LN dissection and mastectomy on same side, has had cellulitis in same extremity in the past, no induration, erythema or tenderness, I don't think this is cellulitis, she had a rash initially which has essentially resolved, denies chest pain, sob, she is on letrozole  Plan: US Upper Extremity Venous Duplex Left        Elevate, obtain stat venous duplex today  Await above results  Discussed reasons to be seen promptly     JUSTINE Luciano Same Day Provider     Subjective   Hoda is a 70 year old, presenting for the following health issues:  Derm Problem (Rash)      5/7/2025     1:06 PM   Additional Questions   Roomed by Steffi   Accompanied by none     History of Present Illness       Reason for visit:  Problem with my left arm  Symptom onset:  1-3 days ago  Symptoms include:  Red rash on my arm  Symptom intensity:  Severe  Symptom progression:  Staying the same  Had these symptoms before:  No   She is taking medications regularly.      Onset Saturday, 4 days ago noticed increased swelling of LUE  She is s/p left axillary node resection and left mastectomy  She also noticed a rash on the area at onset, she took some claritin and the rash has pretty much resolved   The area feels a little warm  She denies pain  She also denies fever/chills, chest pain, dyspnea   She has history of cellulitis of the same extremity  She denies history of PE/DVT/clots          Objective    /78 (BP Location: Right arm, Patient Position: Sitting, Cuff Size: Adult Large)   Pulse 84   Temp 98.3  F (36.8  C) (Oral)   Resp 16   Ht 1.6 m (5' 3\")   Wt 82.1 kg (181 lb)   SpO2 94%   BMI 32.06 kg/m    Body mass index is 32.06 kg/m .    Physical Exam     GENERAL: healthy, alert and no distress  RESP: lungs clear to auscultation - no rales, no rhonchi, no wheezes  CV: regular rates and " rhythm, normal S1 S2, no S3 or S4 and no murmur, no click or rub   MS: extremities- LUE with mild non-pitting edema, non-tender, no erythema, compartments are soft, ulnar and radial pulses palpable  No edema of Bilat lower extremities and RUE   SKIN: faded rash on LUE, almost resolved      Signed Electronically by: Di Maynard PA-C

## 2025-05-08 ENCOUNTER — HOSPITAL ENCOUNTER (OUTPATIENT)
Dept: ULTRASOUND IMAGING | Facility: CLINIC | Age: 70
Discharge: HOME | End: 2025-05-08
Attending: PHYSICIAN ASSISTANT
Payer: COMMERCIAL

## 2025-05-08 PROCEDURE — 93971 EXTREMITY STUDY: CPT | Mod: LT

## 2025-05-09 ENCOUNTER — RESULTS FOLLOW-UP (OUTPATIENT)
Dept: FAMILY MEDICINE | Facility: CLINIC | Age: 70
End: 2025-05-09

## 2025-05-09 NOTE — RESULT ENCOUNTER NOTE
Alan Drummond,     Your ultrasound was negative for blood clots.    How is your swelling?     Please let us know if you have any questions or concerns.    If you have a question about the results, please use the ? (question aleyda) option at the upper right corner.     Regards,  Di Maynard PA-C

## 2025-05-11 NOTE — PROGRESS NOTES
ONCOLOGY NOTE     Hoda Brush  Female, 69 year old, 1955  MRN: [id number]        Hoda is a 69 year old patient from [facility] and is seen here for continuation of care for her metastatic ER+HER2- breast cancer.  She is a refugee from [facility] and was initially seen in clinic with her daughter.  The only data we have from [facility] is an English translation of her records.       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  On 02/11/2014, she was seen by an oncologist.  The clinical staging of T4b N2 M1 was noted.   Her breast cancer was on the left side. There was no right breast cancer, confirmed by the patient and her daughter, correcting a possible error in the translated records.  ER was positive in 100% of the cells, MA at 14% and HER2 was 3+ positive.  It appears that this histopathologic information is on the mastectomy specimen. She underwent an MRI for staging of presumptive bone metastases which was performed 03/02/2014.  There were skeletal metastases in the thoracic vertebrae at 12, L1, L3, L5 and S1 vertebral body, ranging in size from 0.7-3.0 cm in size.  Ischial and right femur were also involved, as well as a large iliac mass measuring about 15 cm in the uterus. 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   Radiation Oncology consultation was performed 03/11/2014, and she was given radiation in 1 dose of 6 Gy to the right iliac lesion.        With the initial diagnosis, she initiated treatment with 6 cycles of CAF neoadjuvant chemotherapy 02/14, 07/07, 03/28, 04/18/14, 05/08 and 05/29/14. She also received monthly zoledronic acid.  She then underwent a modified left mastectomy of Palacios type and left lymphadenoectomy on 06/27/2014.   Pathologic examination showed number at [facility] was [id number] showed infiltrative grade 2 ductal cancer with 6 level 1 metastatic lympFollow up with Jossie for visits  and trastuzumab on 2-5, 2-26, 3-19 with CBC, CMP.  Echocardiogram on 3-19.  Follow up with me 4-9 with CBC, CMP, CA27.29 and CEA and with CT CAP on 4-8.h nodes and 3 level 2 metastatic lymph nodes.  The differentiation was intermediate.  The tumor was ER positive 70% of the cells, NY positive in 40% of the cells and HER2 was 3+ by immunohistochemistry and the Ki-67 labeling index was 20%. Her staging after surgery was stage IV, pT4b N2 M1.  I don't see a biopsy of the skeletal metastases.  She then had continuation with chemotherapy with 4 cycles of Herceptin and taxane with monthly zoledronic acid.  Tamoxifen was initiated.  She then had two years of Herceptin and a decision was made not to continue further Herceptin.  She continued on zoledronic acid every 3 months. She was clinically stable. [place] then [place] the [place] new [place] from [facility].  She has now been changed to letrozole.  Denosumab has now been held for new diagnosis of osteonecrosis of the R maxilla, resumed 2/2025 after a clear from a dentist.     History of cholecystectomy 2020.      TREATMENT HISTORY:  A. Initial diagnosis with metastatic breast cancer in [facility].  Neoadjuvant CAF x 6.   B. Left mastectomy. Left axillary node dissection.  C.  Radiation in 1 dose to R iliac region.  C. Herceptin for 2 years taxane for a prescribed course then stopped, monthly zoledronic acid.  She had 2 years of Herceptin with tamoxifen added after chemotherapy.  D.  Tamoxifen alone and zoledronic acid every 3 months starting 2014.  Letrozole was started   E.  [place]  We restarted Herceptin every 3 weeks and continued tamoxifen. Bone targeted agent changed to denosumab every 9 weeks. Zometa discontinued 2017.  Tamoxifen was changed to letrozole August 2019.    F.  Xgeva discontinued 12-17-19 because of dental issues.   G.  J+J vaccine March, 2021.  H.  Was on Zometa once May 11.  Reaction with eye lid swelling. Discontinued Zometa.  I.  Restart  Xgeva 6-22-21.  Continuing the trastuzumab and letrozole.  Both tolerated well.   J.  Discontinue Xgeva because of osteonecrosis of the maxilla.   Continue trastuzumab and letrozole.   K. Acute back pain. MR MARYELLEN spine 2/10/2025 with stable bone metastases without cord compression and negative PET in bone lesions. L1 with periosteal involvement. Continue trantuzumab/letrozole. Referral to rad onc, neurosurgery, and palliative. Sent liquid biopsy. Clear by a dentist, resumed Xgeva 2/27/2025.    NGS  Genomic Findings  Biomarker Protein Change DNA Change  Variant  Frequency  Interpretation  DEWEY [id number] c.7247T>C 0.9 % Variant of Uncertain Significance  Other Results  BLOOD TMB (mut/Mb) : 1  For a list of mutations informing TMB, see appendix  MICROSATELLITE INSTABILITY : Not Detected  TUMOR FRACTION : 0.9 %  Analysis included these guideline-recommended biomarkers: AKT1, BRAF, BRCA1, BRCA2, ESR1, ERBB2 (HER2), NTRK1/2/3, PALB2, PIK3CA, PTEN,  RET  Incidental Findings* (Pathogenic & Likely Pathogenic Variants)  Clonal Hematopoiesis (CH)  Biomarker Protein Change DNA Change Variant Frequency Interpretation  TP53 H214R c.641A>G 0.7 % Pathogenic Variant      INTERVAL HISTORY  She continues on trastuzumab, letrozole.  No significant pain, fatigue, depression, anxiety.  She speaks English and was able to get by without an .  Had a very bad back pain acutely 2 months ago and went to the ED. MRI 2/10/2025 with stable bone mets without cord compression. The pain is mainly in the middle of the back, not radiating, sharp and intermittent. No bowel/bladder involvement. No weakness or numbness.  The back pain is now resolved.    MRI of the lumbar spine showed metastatic disease without compromise of vertebral body or cortex.  PET scan was not showing active lytic disease.     Walks on a treadmill 20 mins/day. Limited activities due to back pain. Denies other side effect. Good appetite. No respiratory symptoms.      No  current teeth issues. Was cleared by a dentist.      REVIEW OF SYSTEMS:  A 10-point review of systems is entirely negative.     She is a eating a Mediterranean-style diet.  She is exercising by swimming.  I did advise adding a weightbearing exercise.  She takes vitamin D3 2000 International Units per day and calcium.  Her last DEXA scan performed a week ago shows a most valid negative T-score of -2.5 in the left hip, consistent with osteoporosis.       PHYSICAL EXAMINATION:    /77 (BP Location: Right arm, Patient Position: Sitting, Cuff Size: Adult Large)   Pulse 78   Temp 98  F (36.7  C) (Oral)   Resp 16   Wt 82.1 kg (181 lb)   SpO2 97%   BMI 32.06 kg/m    GENERAL:  Hoda appeared generally well and independent  HEENT:  She has no alopecia.  Examination of oropharynx reveals good dentition.  LYMPH:  There is no cervical, supraclavicular, subclavicular or axillary lymphadenopathy.  BREASTS:  Deferred  LUNGS:  Clear to percussion and auscultation.  CARDIAC:  Heart has a regular rate and rhythm, S1, S2.  ABDOMEN:  Soft, nontender, without hepatosplenomegaly.  EXTREMITIES:  Without edema.  PSYCH:  Mood and affect were normal.       LABORATORY DATA:    CMP is remarkable for ALT of 84 and AST of 78.  Glucose 101.  CBC normal.      CT CAP: May 14, 2025   IMPRESSION:  1.  Scattered sclerotic bony metastases are not significantly changed.  2.  Small pulmonary nodules in the right middle lobe and left lower lobe are unchanged.  3.  Hepatic steatosis.     EXAM: MR LUMBAR SPINE W/O & W CONTRAST  2/10/2025 5:53 PM      HISTORY:  severe acute onset midline back pain, known spinal  metastasis; Low back pain; r/o Cancer; No known/automatically detected  potential contraindications to imaging        COMPARISON:  CT CAP 1/6/2025     TECHNIQUE: Sagittal T1-weighted and T2-weighted and axial T2-weighted  images of the lumbar spine were obtained without intravenous contrast.  Post intravenous contrast using gadolinium  axial and sagittal  T1-weighted images were obtained with fat saturation.     CONTRAST: gadobutrol (GADAVIST) injection 8 mL.     FINDINGS:  There are 5 lumbar type vertebrae, used for the purposes of this  dictation. Conus tip at L1. Normal lumbar vertebral alignment.  Heterogeneous marrow signal of the lumbar spine and visualized  portions of the sacrum/pelvis, compatible with known diffuse  metastatic disease. Most conspicuous lesions in the L1 and L3  vertebral bodies, as well as the right iliac bone. Mild disc space  narrowing with loss of the intrinsic T2 signal at T12-L1 and L1-L2.  Normal cauda equina.     On a level by level basis, the findings are as follows:     L1-2: Circumferential disc osteophyte complex. Mild bilateral  neuroforaminal stenosis. Mild spinal canal stenosis.     L2-3: Circumferential disc bulge. Bilateral facet arthropathy. Mild  bilateral neural foraminal stenosis. No spinal canal stenosis.     L3-4: Bilateral facet arthropathy. Mild left neural foraminal  stenosis. Tiny incidental left perineural cyst. Osseous and  periarticular edema of the left facet joint.     L4-5: Bilateral facet arthropathy, right greater than left, and  ligamentum flavum hypertrophy. Mild bilateral neuroforaminal stenosis.  No spinal canal stenosis. Osseous and periarticular edema of the right  facet joint.     L5-S1: Circumferential disc bulge with annular fissuring in the  central zone. Bilateral facet arthropathy, left greater than right. No  spinal canal or neural foraminal stenosis.     The visualized paraspinous soft tissues are within normal limits.                                                                       IMPRESSION:  1. No acute fracture of the lumbar spine.  2. Multifocal metastases of the lumbar spine and visualized portions  of the sacrum/pelvis, similar to the CT 1/6/2025.  3. Multilevel lumbar spondylosis. No high-grade neuroforaminal or  spinal canal stenosis.  4. Active inflammatory  arthropathy of the left L3-4 and right L4-5  facet joints.     I have personally reviewed the examination and initial interpretation  and I agree with the findings.     REBECCA OLMSTEAD MD     PET February 25, 2025  IMPRESSION:   In this patient with history of metastatic left breast cancer:  1. No metabolically active disease. Stable appearance of the sclerotic  lesions throughout the axial and appendicular skeleton.  2. Stable couple of 5 mm pulmonary nodules.  3. Mildly avid nonenlarged right cervical lymph nodes are likely  reactive.     I have personally reviewed the examination and initial interpretation  and I agree with the findings.     DARLING ANNA MD         ASSESSMENT AND PLAN:       Hoda Brush is a 70-year-old woman with a history of ER-positive, NY-positive, HER2 positive breast cancer.  She is from [facility] and came to live in the [place] with [place].  The tumor is ER positive, NY positive and HER2 positive.  She had metastatic disease at the time of presentation with bone-only metastases by report.  She underwent neoadjuvant CAF, had a left mastectomy, left axillary lymph node dissection, radiation to the right hip, which included the right iliac region where she had metastatic disease.  She was initially on tamoxifen but then was switched to letrozole.  She continues on this and every 3-week trastuzumab.  She has had no evidence of disease progression for the last 7 years and possible longer but we don't have detailed data from [facility].  Markers are low and stable.  We have continued Herceptin every 3 weeks as well as daily letrozole. CT CAP shows stable pulmonary nodules.   Hoda Brush continues to do well on a combination of trastuzumab and letrozole. She has no evidence of disease progression.   Her ejection fraction remains stable.   She continues to do well on trastuzumab and letrozole.   She will continue with every 4-month CT of the chest, abdomen and pelvis.     New back pain without radiculopathy.  PET scan showed no active metabolism in lytic lesions in the lumbar spine.  There was no compromise of the vertebral bodies.  MRI confirmed these findings  The right maxillary osteonecrosis has healed and she is able to continue with Xgeva every 3 months, resumed on 2/27/2025.  \  NGS.  None actionable DEWEY mutation.  Echo. Stable EF.  60-65%.  Pain control.  She wants to continue with Tylenol.  She does not want to get a medicine at this time.  Micheleis assure testing will be sent.  We are looking for possible ESR1 mutation.  We will continue with letrozole for now.  Follow up.  Continue on letrozole.  Continue Herceptin every 3 weeks through the end of 2025 alternating providers every 9 weeks. CT CAP every 4 months.  Follow up with Kassidy in 2 months with CBC, CMP, CA27.29 and CEA.  Xgeva every 3 months can be restarted because dental work is completed.  CBC, CMP every 3 weeks and CA27.29 and CEA every 6 weeks. Echo every 3 months, next on March 12.  CT CAP every 4 months.  Next echocardiogram and next Xgeva on August 7.      Thank you for allowing us to participate in this patient's care.       Sincerely,      Lisandro Aguiar MD  Professor  [facility]  [phone]     Interval history  Hoda is doing well today. She recently had some worsening left upper extremity swelling along with a rash extending to her shoulder. She took Claritin with improvement in the rash. She followed up with her PCP on 5/7/25 and a left upper extremity DVT was done that was unremarkable. The rash is completely resolved now however she is slightly worried as she had a similar episode 5 years ago when she required hospitalization and iv antibiotics. She otherwise denies any fever, chills, chest pain, shortness of breath. Her appetite is good and weight is stable.         Assessment and plan:    Hoda Brush is a 69-year-old woman with a history of ER-positive, OH-positive, HER2  positive breast cancer.  She is from [facility] and came to live in the [place] with [place].  The tumor is ER positive, MI positive and HER2 positive.  She continues on Letrozole and every 3-week trastuzumab.  She is tolerating it well. CT CAP shows stable pulmonary nodules.     Hoda had an Echocardiogram in March 2025, and will be due for her next echo in June 2025.     The right maxillary osteonecrosis has healed and she is able to continue with Xgeva every 3 months, resumed on 2/27/2025.  She is due for Xgeva today.     CBC, CMP every 3 weeks and CA27.29 and CEA every 6 weeks. Echo every 3 months, next in June 2025. CT CAP every 4 months.    The patient was seen, examined and discussed with Dr. Stefania DEMPSEY  PGY-2 Internal Medicine Resident     I spent 35 minutes with the patient more than 50% of which was in counseling and coordination of care.

## 2025-05-14 ENCOUNTER — HOSPITAL ENCOUNTER (OUTPATIENT)
Dept: CT IMAGING | Facility: CLINIC | Age: 70
Discharge: HOME | End: 2025-05-14
Attending: INTERNAL MEDICINE
Payer: COMMERCIAL

## 2025-05-14 ENCOUNTER — HOSPITAL ENCOUNTER (OUTPATIENT)
Facility: CLINIC | Age: 70
Discharge: HOME | End: 2025-05-14
Payer: COMMERCIAL

## 2025-05-14 DIAGNOSIS — C50.912 MALIGNANT NEOPLASM OF LEFT FEMALE BREAST, UNSPECIFIED ESTROGEN RECEPTOR STATUS, UNSPECIFIED SITE OF BREAST (H): ICD-10-CM

## 2025-05-14 PROCEDURE — 999N000154 HC STATISTIC RADIOLOGY XRAY, US, CT, MAR, NM

## 2025-05-14 PROCEDURE — 71260 CT THORAX DX C+: CPT

## 2025-05-14 PROCEDURE — 250N000011 HC RX IP 250 OP 636: Performed by: PHYSICIAN ASSISTANT

## 2025-05-14 PROCEDURE — 250N000009 HC RX 250: Performed by: INTERNAL MEDICINE

## 2025-05-14 PROCEDURE — 250N000011 HC RX IP 250 OP 636: Performed by: INTERNAL MEDICINE

## 2025-05-14 RX ORDER — HEPARIN SODIUM,PORCINE 10 UNIT/ML
5-10 VIAL (ML) INTRAVENOUS
Status: DISCONTINUED | OUTPATIENT
Start: 2025-05-14 | End: 2025-05-14 | Stop reason: HOSPADM

## 2025-05-14 RX ORDER — HEPARIN SODIUM,PORCINE 10 UNIT/ML
5-10 VIAL (ML) INTRAVENOUS EVERY 24 HOURS
Status: DISCONTINUED | OUTPATIENT
Start: 2025-05-14 | End: 2025-05-14 | Stop reason: HOSPADM

## 2025-05-14 RX ORDER — HEPARIN SODIUM (PORCINE) LOCK FLUSH IV SOLN 100 UNIT/ML 100 UNIT/ML
5-10 SOLUTION INTRAVENOUS
Status: DISCONTINUED | OUTPATIENT
Start: 2025-05-14 | End: 2025-05-14 | Stop reason: HOSPADM

## 2025-05-14 RX ORDER — IOPAMIDOL 755 MG/ML
89 INJECTION, SOLUTION INTRAVASCULAR ONCE
Status: COMPLETED | OUTPATIENT
Start: 2025-05-14 | End: 2025-05-14

## 2025-05-14 RX ADMIN — IOPAMIDOL 89 ML: 755 INJECTION, SOLUTION INTRAVENOUS at 15:40

## 2025-05-14 RX ADMIN — SODIUM CHLORIDE 65 ML: 9 INJECTION, SOLUTION INTRAVENOUS at 15:36

## 2025-05-14 RX ADMIN — Medication 5 ML: at 15:41

## 2025-05-14 ASSESSMENT — ACTIVITIES OF DAILY LIVING (ADL)
ADLS_ACUITY_SCORE: 42
ADLS_ACUITY_SCORE: 42

## 2025-05-14 NOTE — PROGRESS NOTES
1528: To CT for chest port access per held orders.  1533: Consent given to access chest port. Allergies reviewed. Chest port accessed without difficulty, good blood return. Flushed with 10cc normal saline.  1541: CT completed. Port flushed with 500U heparin and de-accessed. Bandage applied. Pt tolerated well. Discharged home ambulatory.

## 2025-05-15 ENCOUNTER — APPOINTMENT (OUTPATIENT)
Dept: LAB | Facility: CLINIC | Age: 70
End: 2025-05-15
Attending: INTERNAL MEDICINE
Payer: COMMERCIAL

## 2025-05-15 ENCOUNTER — ONCOLOGY VISIT (OUTPATIENT)
Dept: ONCOLOGY | Facility: CLINIC | Age: 70
End: 2025-05-15
Attending: INTERNAL MEDICINE
Payer: COMMERCIAL

## 2025-05-15 VITALS
TEMPERATURE: 98 F | SYSTOLIC BLOOD PRESSURE: 122 MMHG | DIASTOLIC BLOOD PRESSURE: 77 MMHG | WEIGHT: 181 LBS | BODY MASS INDEX: 32.06 KG/M2 | RESPIRATION RATE: 16 BRPM | OXYGEN SATURATION: 97 % | HEART RATE: 78 BPM

## 2025-05-15 DIAGNOSIS — C50.412 CARCINOMA OF UPPER-OUTER QUADRANT OF LEFT BREAST IN FEMALE, ESTROGEN RECEPTOR POSITIVE (H): ICD-10-CM

## 2025-05-15 DIAGNOSIS — C50.912 MALIGNANT NEOPLASM OF LEFT FEMALE BREAST, UNSPECIFIED ESTROGEN RECEPTOR STATUS, UNSPECIFIED SITE OF BREAST (H): Primary | ICD-10-CM

## 2025-05-15 DIAGNOSIS — C79.51 MALIGNANT NEOPLASM METASTATIC TO BONE (H): ICD-10-CM

## 2025-05-15 DIAGNOSIS — Z17.0 CARCINOMA OF UPPER-OUTER QUADRANT OF LEFT BREAST IN FEMALE, ESTROGEN RECEPTOR POSITIVE (H): ICD-10-CM

## 2025-05-15 DIAGNOSIS — Z51.11 ENCOUNTER FOR ANTINEOPLASTIC CHEMOTHERAPY: ICD-10-CM

## 2025-05-15 LAB
ALBUMIN SERPL BCG-MCNC: 4 G/DL (ref 3.5–5.2)
ALP SERPL-CCNC: 52 U/L (ref 40–150)
ALT SERPL W P-5'-P-CCNC: 84 U/L (ref 0–50)
ANION GAP SERPL CALCULATED.3IONS-SCNC: 10 MMOL/L (ref 7–15)
AST SERPL W P-5'-P-CCNC: 78 U/L (ref 0–45)
BASOPHILS # BLD AUTO: 0 10E3/UL (ref 0–0.2)
BASOPHILS NFR BLD AUTO: 1 %
BILIRUB SERPL-MCNC: 0.4 MG/DL
BUN SERPL-MCNC: 11.9 MG/DL (ref 8–23)
CALCIUM SERPL-MCNC: 9.4 MG/DL (ref 8.8–10.4)
CEA SERPL-MCNC: 1.1 NG/ML
CHLORIDE SERPL-SCNC: 107 MMOL/L (ref 98–107)
CREAT SERPL-MCNC: 0.84 MG/DL (ref 0.51–0.95)
EGFRCR SERPLBLD CKD-EPI 2021: 75 ML/MIN/1.73M2
EOSINOPHIL # BLD AUTO: 0.2 10E3/UL (ref 0–0.7)
EOSINOPHIL NFR BLD AUTO: 3 %
ERYTHROCYTE [DISTWIDTH] IN BLOOD BY AUTOMATED COUNT: 14 % (ref 10–15)
GLUCOSE SERPL-MCNC: 101 MG/DL (ref 70–99)
HCO3 SERPL-SCNC: 26 MMOL/L (ref 22–29)
HCT VFR BLD AUTO: 38.2 % (ref 35–47)
HGB BLD-MCNC: 11.9 G/DL (ref 11.7–15.7)
IMM GRANULOCYTES # BLD: 0 10E3/UL
IMM GRANULOCYTES NFR BLD: 0 %
LYMPHOCYTES # BLD AUTO: 2.3 10E3/UL (ref 0.8–5.3)
LYMPHOCYTES NFR BLD AUTO: 34 %
MCH RBC QN AUTO: 29 PG (ref 26.5–33)
MCHC RBC AUTO-ENTMCNC: 31.2 G/DL (ref 31.5–36.5)
MCV RBC AUTO: 93 FL (ref 78–100)
MONOCYTES # BLD AUTO: 0.4 10E3/UL (ref 0–1.3)
MONOCYTES NFR BLD AUTO: 6 %
NEUTROPHILS # BLD AUTO: 3.7 10E3/UL (ref 1.6–8.3)
NEUTROPHILS NFR BLD AUTO: 56 %
NRBC # BLD AUTO: 0 10E3/UL
NRBC BLD AUTO-RTO: 0 /100
PLATELET # BLD AUTO: 205 10E3/UL (ref 150–450)
POTASSIUM SERPL-SCNC: 4.2 MMOL/L (ref 3.4–5.3)
PROT SERPL-MCNC: 7.5 G/DL (ref 6.4–8.3)
RBC # BLD AUTO: 4.1 10E6/UL (ref 3.8–5.2)
SODIUM SERPL-SCNC: 143 MMOL/L (ref 135–145)
WBC # BLD AUTO: 6.6 10E3/UL (ref 4–11)

## 2025-05-15 PROCEDURE — G0463 HOSPITAL OUTPT CLINIC VISIT: HCPCS | Mod: 25 | Performed by: INTERNAL MEDICINE

## 2025-05-15 PROCEDURE — 82310 ASSAY OF CALCIUM: CPT

## 2025-05-15 PROCEDURE — 250N000011 HC RX IP 250 OP 636: Performed by: INTERNAL MEDICINE

## 2025-05-15 PROCEDURE — 82378 CARCINOEMBRYONIC ANTIGEN: CPT

## 2025-05-15 PROCEDURE — 258N000003 HC RX IP 258 OP 636: Performed by: INTERNAL MEDICINE

## 2025-05-15 PROCEDURE — 85025 COMPLETE CBC W/AUTO DIFF WBC: CPT

## 2025-05-15 PROCEDURE — 36591 DRAW BLOOD OFF VENOUS DEVICE: CPT

## 2025-05-15 PROCEDURE — 96372 THER/PROPH/DIAG INJ SC/IM: CPT | Performed by: INTERNAL MEDICINE

## 2025-05-15 PROCEDURE — 99214 OFFICE O/P EST MOD 30 MIN: CPT | Performed by: INTERNAL MEDICINE

## 2025-05-15 PROCEDURE — 86300 IMMUNOASSAY TUMOR CA 15-3: CPT

## 2025-05-15 RX ORDER — HEPARIN SODIUM (PORCINE) LOCK FLUSH IV SOLN 100 UNIT/ML 100 UNIT/ML
5 SOLUTION INTRAVENOUS
Status: DISCONTINUED | OUTPATIENT
Start: 2025-05-15 | End: 2025-05-15 | Stop reason: HOSPADM

## 2025-05-15 RX ORDER — METHYLPREDNISOLONE SODIUM SUCCINATE 40 MG/ML
40 INJECTION INTRAMUSCULAR; INTRAVENOUS
Start: 2025-06-05

## 2025-05-15 RX ORDER — DIPHENHYDRAMINE HYDROCHLORIDE 50 MG/ML
50 INJECTION, SOLUTION INTRAMUSCULAR; INTRAVENOUS
Start: 2025-06-05

## 2025-05-15 RX ORDER — DIPHENHYDRAMINE HCL 25 MG
50 CAPSULE ORAL
Start: 2025-06-05

## 2025-05-15 RX ORDER — EPINEPHRINE 1 MG/ML
0.3 INJECTION, SOLUTION INTRAMUSCULAR; SUBCUTANEOUS EVERY 5 MIN PRN
OUTPATIENT
Start: 2025-06-05

## 2025-05-15 RX ORDER — HEPARIN SODIUM (PORCINE) LOCK FLUSH IV SOLN 100 UNIT/ML 100 UNIT/ML
5 SOLUTION INTRAVENOUS
OUTPATIENT
Start: 2025-06-05

## 2025-05-15 RX ORDER — ALBUTEROL SULFATE 0.83 MG/ML
2.5 SOLUTION RESPIRATORY (INHALATION)
OUTPATIENT
Start: 2025-06-05

## 2025-05-15 RX ORDER — HEPARIN SODIUM (PORCINE) LOCK FLUSH IV SOLN 100 UNIT/ML 100 UNIT/ML
5 SOLUTION INTRAVENOUS ONCE
Status: COMPLETED | OUTPATIENT
Start: 2025-05-15 | End: 2025-05-15

## 2025-05-15 RX ORDER — MEPERIDINE HYDROCHLORIDE 25 MG/ML
25 INJECTION INTRAMUSCULAR; INTRAVENOUS; SUBCUTANEOUS
OUTPATIENT
Start: 2025-06-05

## 2025-05-15 RX ORDER — HEPARIN SODIUM,PORCINE 10 UNIT/ML
5-20 VIAL (ML) INTRAVENOUS DAILY PRN
OUTPATIENT
Start: 2025-06-05

## 2025-05-15 RX ORDER — DIPHENHYDRAMINE HYDROCHLORIDE 50 MG/ML
25 INJECTION, SOLUTION INTRAMUSCULAR; INTRAVENOUS
Start: 2025-06-05

## 2025-05-15 RX ORDER — ALBUTEROL SULFATE 90 UG/1
1-2 INHALANT RESPIRATORY (INHALATION)
Start: 2025-06-05

## 2025-05-15 RX ORDER — LORAZEPAM 2 MG/ML
0.5 INJECTION INTRAMUSCULAR EVERY 4 HOURS PRN
OUTPATIENT
Start: 2025-06-05

## 2025-05-15 RX ORDER — ACETAMINOPHEN 325 MG/1
650 TABLET ORAL
Start: 2025-06-05

## 2025-05-15 RX ADMIN — HEPARIN 3 ML: 100 SYRINGE at 09:20

## 2025-05-15 RX ADMIN — DENOSUMAB 120 MG: 120 INJECTION SUBCUTANEOUS at 11:03

## 2025-05-15 RX ADMIN — SODIUM CHLORIDE 500 MG: 0.9 INJECTION, SOLUTION INTRAVENOUS at 11:02

## 2025-05-15 RX ADMIN — HEPARIN 5 ML: 100 SYRINGE at 11:32

## 2025-05-15 ASSESSMENT — PAIN SCALES - GENERAL: PAINLEVEL_OUTOF10: NO PAIN (0)

## 2025-05-15 NOTE — NURSING NOTE
"Oncology Rooming Note    May 15, 2025 9:41 AM   Hoda Brush is a 70 year old female who presents for:    Chief Complaint   Patient presents with    Port Draw     Labs drawn via port by RN. VS taken.    Oncology Clinic Visit     Metastatic Breast Cancer     Initial Vitals: /77 (BP Location: Right arm, Patient Position: Sitting, Cuff Size: Adult Large)   Pulse 78   Temp 98  F (36.7  C) (Oral)   Resp 16   Wt 82.1 kg (181 lb)   SpO2 97%   BMI 32.06 kg/m   Estimated body mass index is 32.06 kg/m  as calculated from the following:    Height as of 5/7/25: 1.6 m (5' 3\").    Weight as of this encounter: 82.1 kg (181 lb). Body surface area is 1.91 meters squared.  No Pain (0) Comment: Data Unavailable   No LMP recorded. Patient is postmenopausal.  Allergies reviewed: Yes  Medications reviewed: Yes    Medications: Medication refills not needed today.  Pharmacy name entered into [employer]: [facility] [id number] [place], [place] - [address]    Frailty Screening:   Is the patient here for a new oncology consult visit in cancer care? 2. No    PHQ9:  Did this patient require a PHQ9?: No      Clinical concerns: None       Tianna Taylor LPN  5/15/2025              "

## 2025-05-15 NOTE — LETTER
5/15/2025      Hoda Brush  [address]  [facility] [address]      Dear Colleague,    Thank you for referring your patient, Hoda Brush, to the [facility]. Please see a copy of my visit note below.    ONCOLOGY NOTE     Hoda Brush  Female, 69 year old, 1955  MRN: [id number]        Hoda is a 69 year old patient from [facility] and is seen here for continuation of care for her metastatic ER+HER2- breast cancer.  She is a refugee from [facility] and was initially seen in clinic with her daughter.  The only data we have from [facility] is an English translation of her records.       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  On 02/11/2014, she was seen by an oncologist.  The clinical staging of T4b N2 M1 was noted.   Her breast cancer was on the left side. There was no right breast cancer, confirmed by the patient and her daughter, correcting a possible error in the translated records.  ER was positive in 100% of the cells, AL at 14% and HER2 was 3+ positive.  It appears that this histopathologic information is on the mastectomy specimen. She underwent an MRI for staging of presumptive bone metastases which was performed 03/02/2014.  There were skeletal metastases in the thoracic vertebrae at 12, L1, L3, L5 and S1 vertebral body, ranging in size from 0.7-3.0 cm in size.  Ischial and right femur were also involved, as well as a large iliac mass measuring about 15 cm in the uterus. 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   Radiation Oncology consultation was performed 03/11/2014, and she was given radiation in 1 dose of 6 Gy to the right iliac lesion.        With the initial diagnosis, she initiated treatment with 6 cycles of CAF neoadjuvant chemotherapy 02/14, 07/07, 03/28, 04/18/14, 05/08 and 05/29/14. She also received monthly zoledronic acid.  She then underwent a  modified left mastectomy of Palacios type and left lymphadenoectomy on 06/27/2014.   Pathologic examination showed number at [facility] was [id number] showed infiltrative grade 2 ductal cancer with 6 level 1 metastatic lympFollow up with Jossie for visits and trastuzumab on 2-5, 2-26, 3-19 with CBC, CMP.  Echocardiogram on 3-19.  Follow up with me 4-9 with CBC, CMP, CA27.29 and CEA and with CT CAP on 4-8.h nodes and 3 level 2 metastatic lymph nodes.  The differentiation was intermediate.  The tumor was ER positive 70% of the cells, RI positive in 40% of the cells and HER2 was 3+ by immunohistochemistry and the Ki-67 labeling index was 20%. Her staging after surgery was stage IV, pT4b N2 M1.  I don't see a biopsy of the skeletal metastases.  She then had continuation with chemotherapy with 4 cycles of Herceptin and taxane with monthly zoledronic acid.  Tamoxifen was initiated.  She then had two years of Herceptin and a decision was made not to continue further Herceptin.  She continued on zoledronic acid every 3 months. She was clinically stable. [place] the [place] new [place] from [facility].  She has now been changed to letrozole.  Denosumab has now been held for new diagnosis of osteonecrosis of the R maxilla, resumed 2/2025 after a clear from a dentist.     History of cholecystectomy 2020.      TREATMENT HISTORY:  A. Initial diagnosis with metastatic breast cancer in [facility].  Neoadjuvant CAF x 6.   B. Left mastectomy. Left axillary node dissection.  C.  Radiation in 1 dose to R iliac region.  C. Herceptin for 2 years taxane for a prescribed course then stopped, monthly zoledronic acid.  She had 2 years of Herceptin with tamoxifen added after chemotherapy.  D.  Tamoxifen alone and zoledronic acid every 3 months starting 2014.  Letrozole was started   E.  [place]  We restarted Herceptin every 3 weeks and continued tamoxifen. Bone targeted agent changed to denosumab every 9 weeks. Zometa  discontinued 2017.  Tamoxifen was changed to letrozole August 2019.    F.  Xgeva discontinued 12-17-19 because of dental issues.   G.  J+J vaccine March, 2021.  H.  Was on Zometa once May 11.  Reaction with eye lid swelling. Discontinued Zometa.  I.  Restart Xgeva 6-22-21.  Continuing the trastuzumab and letrozole.  Both tolerated well.   J.  Discontinue Xgeva because of osteonecrosis of the maxilla.   Continue trastuzumab and letrozole.   K. Acute back pain. MR L spine 2/10/2025 with stable bone metastases without cord compression and negative PET in bone lesions. L1 with periosteal involvement. Continue trantuzumab/letrozole. Referral to rad onc, neurosurgery, and palliative. Sent liquid biopsy. Clear by a dentist, resumed Xgeva 2/27/2025.    NGS  Genomic Findings  Biomarker Protein Change DNA Change  Variant  Frequency  Interpretation  DEWEY [id number] c.7247T>C 0.9 % Variant of Uncertain Significance  Other Results  BLOOD TMB (mut/Mb) : 1  For a list of mutations informing TMB, see appendix  MICROSATELLITE INSTABILITY : Not Detected  TUMOR FRACTION : 0.9 %  Analysis included these guideline-recommended biomarkers: AKT1, BRAF, BRCA1, BRCA2, ESR1, ERBB2 (HER2), NTRK1/2/3, PALB2, PIK3CA, PTEN,  RET  Incidental Findings* (Pathogenic & Likely Pathogenic Variants)  Clonal Hematopoiesis (CH)  Biomarker Protein Change DNA Change Variant Frequency Interpretation  TP53 H214R c.641A>G 0.7 % Pathogenic Variant      INTERVAL HISTORY  She continues on trastuzumab, letrozole.  No significant pain, fatigue, depression, anxiety.  She speaks English and was able to get by without an .  Had a very bad back pain acutely 2 months ago and went to the ED. MRI 2/10/2025 with stable bone mets without cord compression. The pain is mainly in the middle of the back, not radiating, sharp and intermittent. No bowel/bladder involvement. No weakness or numbness.  The back pain is now resolved.    MRI of the lumbar spine showed metastatic  disease without compromise of vertebral body or cortex.  PET scan was not showing active lytic disease.     Walks on a treadmill 20 mins/day. Limited activities due to back pain. Denies other side effect. Good appetite. No respiratory symptoms.      No current teeth issues. Was cleared by a dentist.      REVIEW OF SYSTEMS:  A 10-point review of systems is entirely negative.     She is a eating a Mediterranean-style diet.  She is exercising by swimming.  I did advise adding a weightbearing exercise.  She takes vitamin D3 2000 International Units per day and calcium.  Her last DEXA scan performed a week ago shows a most valid negative T-score of -2.5 in the left hip, consistent with osteoporosis.       PHYSICAL EXAMINATION:    /77 (BP Location: Right arm, Patient Position: Sitting, Cuff Size: Adult Large)   Pulse 78   Temp 98  F (36.7  C) (Oral)   Resp 16   Wt 82.1 kg (181 lb)   SpO2 97%   BMI 32.06 kg/m    GENERAL:  Hoda appeared generally well and independent  HEENT:  She has no alopecia.  Examination of oropharynx reveals good dentition.  LYMPH:  There is no cervical, supraclavicular, subclavicular or axillary lymphadenopathy.  BREASTS:  Deferred  LUNGS:  Clear to percussion and auscultation.  CARDIAC:  Heart has a regular rate and rhythm, S1, S2.  ABDOMEN:  Soft, nontender, without hepatosplenomegaly.  EXTREMITIES:  Without edema.  PSYCH:  Mood and affect were normal.       LABORATORY DATA:            EXAM: MR LUMBAR SPINE W/O & W CONTRAST  2/10/2025 5:53 PM      HISTORY:  severe acute onset midline back pain, known spinal  metastasis; Low back pain; r/o Cancer; No known/automatically detected  potential contraindications to imaging        COMPARISON:  CT CAP 1/6/2025     TECHNIQUE: Sagittal T1-weighted and T2-weighted and axial T2-weighted  images of the lumbar spine were obtained without intravenous contrast.  Post intravenous contrast using gadolinium axial and sagittal  T1-weighted images were  obtained with fat saturation.     CONTRAST: gadobutrol (GADAVIST) injection 8 mL.     FINDINGS:  There are 5 lumbar type vertebrae, used for the purposes of this  dictation. Conus tip at L1. Normal lumbar vertebral alignment.  Heterogeneous marrow signal of the lumbar spine and visualized  portions of the sacrum/pelvis, compatible with known diffuse  metastatic disease. Most conspicuous lesions in the L1 and L3  vertebral bodies, as well as the right iliac bone. Mild disc space  narrowing with loss of the intrinsic T2 signal at T12-L1 and L1-L2.  Normal cauda equina.     On a level by level basis, the findings are as follows:     L1-2: Circumferential disc osteophyte complex. Mild bilateral  neuroforaminal stenosis. Mild spinal canal stenosis.     L2-3: Circumferential disc bulge. Bilateral facet arthropathy. Mild  bilateral neural foraminal stenosis. No spinal canal stenosis.     L3-4: Bilateral facet arthropathy. Mild left neural foraminal  stenosis. Tiny incidental left perineural cyst. Osseous and  periarticular edema of the left facet joint.     L4-5: Bilateral facet arthropathy, right greater than left, and  ligamentum flavum hypertrophy. Mild bilateral neuroforaminal stenosis.  No spinal canal stenosis. Osseous and periarticular edema of the right  facet joint.     L5-S1: Circumferential disc bulge with annular fissuring in the  central zone. Bilateral facet arthropathy, left greater than right. No  spinal canal or neural foraminal stenosis.     The visualized paraspinous soft tissues are within normal limits.                                                                       IMPRESSION:  1. No acute fracture of the lumbar spine.  2. Multifocal metastases of the lumbar spine and visualized portions  of the sacrum/pelvis, similar to the CT 1/6/2025.  3. Multilevel lumbar spondylosis. No high-grade neuroforaminal or  spinal canal stenosis.  4. Active inflammatory arthropathy of the left L3-4 and right  L4-5  facet joints.     I have personally reviewed the examination and initial interpretation  and I agree with the findings.     REBECCA OLMSTEAD MD     PET February 25, 2025  IMPRESSION:   In this patient with history of metastatic left breast cancer:  1. No metabolically active disease. Stable appearance of the sclerotic  lesions throughout the axial and appendicular skeleton.  2. Stable couple of 5 mm pulmonary nodules.  3. Mildly avid nonenlarged right cervical lymph nodes are likely  reactive.     I have personally reviewed the examination and initial interpretation  and I agree with the findings.     DARLING ANNA MD         ASSESSMENT AND PLAN:       Hoda Brush is a 70-year-old woman with a history of ER-positive, WI-positive, HER2 positive breast cancer.  She is from [facility] and came to live in the [place] with [place].  The tumor is ER positive, WI positive and HER2 positive.  She had metastatic disease at the time of presentation with bone-only metastases by report.  She underwent neoadjuvant CAF, had a left mastectomy, left axillary lymph node dissection, radiation to the right hip, which included the right iliac region where she had metastatic disease.  She was initially on tamoxifen but then was switched to letrozole.  She continues on this and every 3-week trastuzumab.  She has had no evidence of disease progression for the last 7 years and possible longer but we don't have detailed data from [facility].  Markers are low and stable.  We have continued Herceptin every 3 weeks as well as daily letrozole. CT CAP shows stable pulmonary nodules.   Hoda Brush continues to do well on a combination of trastuzumab and letrozole. She has no evidence of disease progression.   Her ejection fraction remains stable.   She continues to do well on trastuzumab and letrozole.   She will continue with every 4-month CT of the chest, abdomen and pelvis.    New back pain without  radiculopathy.  PET scan showed no active metabolism in lytic lesions in the lumbar spine.  There was no compromise of the vertebral bodies.  MRI confirmed these findings  The right maxillary osteonecrosis has healed and she is able to continue with Xgeva every 3 months, resumed on 2/27/2025.  \.  None actionable DEWEY mutation.  Echo. Stable EF.  60-65%.  Pain control.  She wants to continue with Tylenol.  She does not want to get a medicine at this time.  Marlo gonzalez testing will be sent.  We are looking for possible ESR1 mutation.  We will continue with letrozole for now.  Follow up.  Continue on letrozole.  Continue Herceptin every 3 weeks through the end of 2025 alternating providers every 9 weeks. CT CAP every 4 months.  Follow up with Kassidy in 2 months with CBC, CMP, CA27.29 and CEA.  Xgeva every 3 months can be restarted because dental work is completed.  CBC, CMP every 3 weeks and CA27.29 and CEA every 6 weeks. Echo every 3 months, next on March 12.  CT CAP every 4 months.  Next echocardiogram and next Xgeva on August 7.      Thank you for allowing us to participate in this patient's care.       Sincerely,      Lisandro Aguiar MD  Professor  [facility]  [phone]     Interval history  Hoda is doing well today. She recently had some worsening left upper extremity swelling along with a rash extending to her shoulder. She took Claritin with improvement in the rash. She followed up with her PCP on 5/7/25 and a left upper extremity DVT was done that was unremarkable. The rash is completely resolved now however she is slightly worried as she had a similar episode 5 years ago when she required hospitalization and iv antibiotics. She otherwise denies any fever, chills, chest pain, shortness of breath. Her appetite is good and weight is stable.         Assessment and plan:    Hoda Brush is a 69-year-old woman with a history of ER-positive, MA-positive, HER2 positive breast cancer.  She is  from [facility] and came to live in the [place] with [place].  The tumor is ER positive, VA positive and HER2 positive.  She continues on Letrozole and every 3-week trastuzumab.  She is tolerating it well. CT CAP shows stable pulmonary nodules.     Hoda had an Echocardiogram in March 2025, and will be due for her next echo in June 2025.     The right maxillary osteonecrosis has healed and she is able to continue with Xgeva every 3 months, resumed on 2/27/2025.  She is due for Xgeva today.     CBC, CMP every 3 weeks and CA27.29 and CEA every 6 weeks. Echo every 3 months, next in June 2025. CT CAP every 4 months.    The patient was seen, examined and discussed with Dr. Stefania DEMPSEY  PGY-2 Internal Medicine Resident     I spent 35 minutes with the patient more than 50% of which was in counseling and coordination of care.    Again, thank you for allowing me to participate in the care of your patient.        Sincerely,        Lisandor Aguiar MD    Electronically signed

## 2025-05-15 NOTE — PROGRESS NOTES
Infusion Nursing Note:  Hoda Brush presents today for Cycle 6, day 1 Trastuzimab-qyyp and Xgeva.    Patient seen by provider today: Yes: Dr. Aguiar    Note: Patient presents to clinic today feeling well with no questions.  Pt would like to proceed with therapy today. Pt did not request or require any intervention for pain today.    Intravenous Access:  Implanted Port.    Treatment Conditions:  Lab Results   Component Value Date    HGB 11.9 05/15/2025    WBC 6.6 05/15/2025    ANEU 3.7 05/15/2025     05/15/2025        Lab Results   Component Value Date     05/15/2025    POTASSIUM 4.2 05/15/2025    CR 0.84 05/15/2025    TAYLER 9.4 05/15/2025    BILITOTAL 0.4 05/15/2025    ALBUMIN 4.0 05/15/2025    ALT 84 (H) 05/15/2025    AST 78 (H) 05/15/2025     Results reviewed, labs MET treatment parameters, ok to proceed with treatment.  ECHO/MUGA completed 3/13/25 EF 55-60%.    Post Infusion Assessment:  Patient tolerated infusion without incident.  Patient tolerated Xgeva injection to RUE without incident.  Blood return noted pre and post infusion.  Site patent and intact, free from redness, edema or discomfort.  No evidence of extravasations.  Access discontinued per protocol.    Discharge Plan:   Patient declined prescription refills.  Discharge instructions reviewed with: Patient.  Patient and/or family verbalized understanding of discharge instructions and all questions answered.  AVS to patient via [employer].  Patient will return 6/5/2025 for next appointment.   Patient discharged in stable condition accompanied by: self.  Departure Mode: Ambulatory.    Steffi Oh RN

## 2025-05-15 NOTE — PATIENT INSTRUCTIONS
Contact Numbers    [facility]: [phone]    Call with chills and/or temperature greater than or equal to 100.5, uncontrolled nausea/vomiting, diarrhea, constipation, dizziness, shortness of breath, chest pain, bleeding, unexplained bruising, or any new/concerning symptoms, questions/concerns.     If you are having any concerning symptoms or wish to speak to a provider before your next infusion visit, please call your care coordinator or triage to notify them so we can adequately serve you.       After Hours: [phone]    If after hours, weekends, or holidays, call main hospital  and ask for Oncology doctor on call.      May 2025      Jakub Monday Tuesday Wednesday Thursday Friday Saturday                       1     2     3       4     5     6     7    Provider Visit  12:40 PM   (30 min.)   Di Mckoy PA-C   [facility] [place] 8    US UPPER EXTREMITY VENOUS DUPLEX LEFT  11:35 AM   (30 min.)   SHUS1   [facility] 9     10       11     12     13     14    Admission   3:16 PM   Non-Fv Credentialed Provider, Radiology   [facility]   (Discharge: 5/14/2025)    CT CHEST/ABDOMEN/PELVIS W CONTRAST   3:30 PM   (20 min.)   SHCT1   [facility] 15    Lab Central   9:15 AM   (15 min.)   [facility]   [facility]    Return Patient   9:25 AM   (30 min.)   Lisandro Aguiar MD   [facility]    Infusion 60  10:30 AM   (60 min.)   RASHAAD ONC INFUSION NURSE   [facility] 16     17       18     19     20     21     22     23     24       25     26     27     28     29     30     31 June 2025 Sunday Monday Tuesday Wednesday Thursday Friday Saturday   1     2     3     4     5    Lab Central  11:30 AM   (15 min.)   [facility]   [facility]  Clinic    Infusion 60  12:00 PM   (60 min.)    ONC INFUSION NURSE   [facility] 6     7       8     9     10     11     12     13     14       15     16     17     18     19     20     21       22     23     24    Annual Wellness Visit  10:40 AM   (30 min.)   Laury Dee MD   [facility] [place] 25     26    Lab Central  10:15 AM   (15 min.)    [facility]   [facility]    ECHO CONGENITAL ADULT  10:45 AM   (60 min.)   UCECHCR2   [facility]    Infusion 60  12:00 PM   (60 min.)    ONC INFUSION NURSE   [facility] 27     28       29     30                                                Lab Results:  Recent Results (from the past 12 hours)   COMPREHENSIVE METABOLIC PANEL    Collection Time: 05/15/25  9:16 AM   Result Value Ref Range    Sodium 143 135 - 145 mmol/L    Potassium 4.2 3.4 - 5.3 mmol/L    Carbon Dioxide (CO2) 26 22 - 29 mmol/L    Anion Gap 10 7 - 15 mmol/L    Urea Nitrogen 11.9 8.0 - 23.0 mg/dL    Creatinine 0.84 0.51 - 0.95 mg/dL    GFR Estimate 75 >60 mL/min/1.73m2    Calcium 9.4 8.8 - 10.4 mg/dL    Chloride 107 98 - 107 mmol/L    Glucose 101 (H) 70 - 99 mg/dL    Alkaline Phosphatase 52 40 - 150 U/L    AST 78 (H) 0 - 45 U/L    ALT 84 (H) 0 - 50 U/L    Protein Total 7.5 6.4 - 8.3 g/dL    Albumin 4.0 3.5 - 5.2 g/dL    Bilirubin Total 0.4 <=1.2 mg/dL   CBC with platelets and differential    Collection Time: 05/15/25  9:16 AM   Result Value Ref Range    WBC Count 6.6 4.0 - 11.0 10e3/uL    RBC Count 4.10 3.80 - 5.20 10e6/uL    Hemoglobin 11.9 11.7 - 15.7 g/dL    Hematocrit 38.2 35.0 - 47.0 %    MCV 93 78 - 100 fL    MCH 29.0 26.5 - 33.0 pg    MCHC 31.2 (L) 31.5 - 36.5 g/dL    RDW 14.0 10.0 - 15.0 %    Platelet Count 205 150 - 450 10e3/uL    % Neutrophils 56 %    % Lymphocytes 34 %    % Monocytes 6 %    % Eosinophils 3 %    % Basophils 1 %    % Immature Granulocytes 0 %     NRBCs per 100 WBC 0 <1 /100    Absolute Neutrophils 3.7 1.6 - 8.3 10e3/uL    Absolute Lymphocytes 2.3 0.8 - 5.3 10e3/uL    Absolute Monocytes 0.4 0.0 - 1.3 10e3/uL    Absolute Eosinophils 0.2 0.0 - 0.7 10e3/uL    Absolute Basophils 0.0 0.0 - 0.2 10e3/uL    Absolute Immature Granulocytes 0.0 <=0.4 10e3/uL    Absolute NRBCs 0.0 10e3/uL

## 2025-05-17 LAB — CANCER AG27-29 SERPL-ACNC: 10 U/ML

## 2025-05-17 RX ORDER — ALBUTEROL SULFATE 0.83 MG/ML
2.5 SOLUTION RESPIRATORY (INHALATION)
OUTPATIENT
Start: 2025-06-26

## 2025-05-17 RX ORDER — ALBUTEROL SULFATE 90 UG/1
1-2 INHALANT RESPIRATORY (INHALATION)
Start: 2025-07-17

## 2025-05-17 RX ORDER — ACETAMINOPHEN 325 MG/1
650 TABLET ORAL
Start: 2025-08-07

## 2025-05-17 RX ORDER — METHYLPREDNISOLONE SODIUM SUCCINATE 40 MG/ML
40 INJECTION INTRAMUSCULAR; INTRAVENOUS
Start: 2025-08-07

## 2025-05-17 RX ORDER — EPINEPHRINE 1 MG/ML
0.3 INJECTION, SOLUTION INTRAMUSCULAR; SUBCUTANEOUS EVERY 5 MIN PRN
OUTPATIENT
Start: 2025-08-07

## 2025-05-17 RX ORDER — DIPHENHYDRAMINE HCL 25 MG
50 CAPSULE ORAL
Start: 2025-07-17

## 2025-05-17 RX ORDER — ALBUTEROL SULFATE 90 UG/1
1-2 INHALANT RESPIRATORY (INHALATION)
Start: 2025-06-26

## 2025-05-17 RX ORDER — DIPHENHYDRAMINE HYDROCHLORIDE 50 MG/ML
50 INJECTION, SOLUTION INTRAMUSCULAR; INTRAVENOUS
Start: 2025-07-17

## 2025-05-17 RX ORDER — ALBUTEROL SULFATE 90 UG/1
1-2 INHALANT RESPIRATORY (INHALATION)
Start: 2025-08-07

## 2025-05-17 RX ORDER — DIPHENHYDRAMINE HYDROCHLORIDE 50 MG/ML
25 INJECTION, SOLUTION INTRAMUSCULAR; INTRAVENOUS
Start: 2025-08-07

## 2025-05-17 RX ORDER — ALBUTEROL SULFATE 0.83 MG/ML
2.5 SOLUTION RESPIRATORY (INHALATION)
OUTPATIENT
Start: 2025-08-07

## 2025-05-17 RX ORDER — EPINEPHRINE 1 MG/ML
0.3 INJECTION, SOLUTION INTRAMUSCULAR; SUBCUTANEOUS EVERY 5 MIN PRN
OUTPATIENT
Start: 2025-06-26

## 2025-05-17 RX ORDER — DIPHENHYDRAMINE HYDROCHLORIDE 50 MG/ML
25 INJECTION, SOLUTION INTRAMUSCULAR; INTRAVENOUS
Start: 2025-07-17

## 2025-05-17 RX ORDER — HEPARIN SODIUM (PORCINE) LOCK FLUSH IV SOLN 100 UNIT/ML 100 UNIT/ML
5 SOLUTION INTRAVENOUS
OUTPATIENT
Start: 2025-08-07

## 2025-05-17 RX ORDER — MEPERIDINE HYDROCHLORIDE 25 MG/ML
25 INJECTION INTRAMUSCULAR; INTRAVENOUS; SUBCUTANEOUS
OUTPATIENT
Start: 2025-08-07

## 2025-05-17 RX ORDER — DIPHENHYDRAMINE HYDROCHLORIDE 50 MG/ML
50 INJECTION, SOLUTION INTRAMUSCULAR; INTRAVENOUS
Start: 2025-06-26

## 2025-05-17 RX ORDER — HEPARIN SODIUM (PORCINE) LOCK FLUSH IV SOLN 100 UNIT/ML 100 UNIT/ML
5 SOLUTION INTRAVENOUS
OUTPATIENT
Start: 2025-06-26

## 2025-05-17 RX ORDER — DIPHENHYDRAMINE HCL 25 MG
50 CAPSULE ORAL
Start: 2025-06-26

## 2025-05-17 RX ORDER — DIPHENHYDRAMINE HYDROCHLORIDE 50 MG/ML
25 INJECTION, SOLUTION INTRAMUSCULAR; INTRAVENOUS
Start: 2025-06-26

## 2025-05-17 RX ORDER — METHYLPREDNISOLONE SODIUM SUCCINATE 40 MG/ML
40 INJECTION INTRAMUSCULAR; INTRAVENOUS
Start: 2025-07-17

## 2025-05-17 RX ORDER — HEPARIN SODIUM,PORCINE 10 UNIT/ML
5-20 VIAL (ML) INTRAVENOUS DAILY PRN
OUTPATIENT
Start: 2025-06-26

## 2025-05-17 RX ORDER — ACETAMINOPHEN 325 MG/1
650 TABLET ORAL
Start: 2025-06-26

## 2025-05-17 RX ORDER — DIPHENHYDRAMINE HYDROCHLORIDE 50 MG/ML
50 INJECTION, SOLUTION INTRAMUSCULAR; INTRAVENOUS
Start: 2025-08-07

## 2025-05-17 RX ORDER — HEPARIN SODIUM (PORCINE) LOCK FLUSH IV SOLN 100 UNIT/ML 100 UNIT/ML
5 SOLUTION INTRAVENOUS
OUTPATIENT
Start: 2025-07-17

## 2025-05-17 RX ORDER — ALBUTEROL SULFATE 0.83 MG/ML
2.5 SOLUTION RESPIRATORY (INHALATION)
OUTPATIENT
Start: 2025-07-17

## 2025-05-17 RX ORDER — EPINEPHRINE 1 MG/ML
0.3 INJECTION, SOLUTION INTRAMUSCULAR; SUBCUTANEOUS EVERY 5 MIN PRN
OUTPATIENT
Start: 2025-07-17

## 2025-05-17 RX ORDER — LORAZEPAM 2 MG/ML
0.5 INJECTION INTRAMUSCULAR EVERY 4 HOURS PRN
OUTPATIENT
Start: 2025-08-07

## 2025-05-17 RX ORDER — MEPERIDINE HYDROCHLORIDE 25 MG/ML
25 INJECTION INTRAMUSCULAR; INTRAVENOUS; SUBCUTANEOUS
OUTPATIENT
Start: 2025-06-26

## 2025-05-17 RX ORDER — LORAZEPAM 2 MG/ML
0.5 INJECTION INTRAMUSCULAR EVERY 4 HOURS PRN
OUTPATIENT
Start: 2025-07-17

## 2025-05-17 RX ORDER — HEPARIN SODIUM,PORCINE 10 UNIT/ML
5-20 VIAL (ML) INTRAVENOUS DAILY PRN
OUTPATIENT
Start: 2025-07-17

## 2025-05-17 RX ORDER — MEPERIDINE HYDROCHLORIDE 25 MG/ML
25 INJECTION INTRAMUSCULAR; INTRAVENOUS; SUBCUTANEOUS
OUTPATIENT
Start: 2025-07-17

## 2025-05-17 RX ORDER — METHYLPREDNISOLONE SODIUM SUCCINATE 40 MG/ML
40 INJECTION INTRAMUSCULAR; INTRAVENOUS
Start: 2025-06-26

## 2025-05-17 RX ORDER — HEPARIN SODIUM,PORCINE 10 UNIT/ML
5-20 VIAL (ML) INTRAVENOUS DAILY PRN
OUTPATIENT
Start: 2025-08-07

## 2025-05-17 RX ORDER — DIPHENHYDRAMINE HCL 25 MG
50 CAPSULE ORAL
Start: 2025-08-07

## 2025-05-17 RX ORDER — ACETAMINOPHEN 325 MG/1
650 TABLET ORAL
Start: 2025-07-17

## 2025-05-17 RX ORDER — LORAZEPAM 2 MG/ML
0.5 INJECTION INTRAMUSCULAR EVERY 4 HOURS PRN
OUTPATIENT
Start: 2025-06-26

## 2025-05-28 ENCOUNTER — PATIENT OUTREACH (OUTPATIENT)
Dept: GERIATRIC MEDICINE | Facility: CLINIC | Age: 70
End: 2025-05-28
Payer: COMMERCIAL

## 2025-05-28 NOTE — Clinical Note
Hi Dr. Dee,  I am Hoda's health . I am required to notify you that she declined her annual health risk assessment due to having no needs. No concerns at this time. Please let me know if you have any questions.  Thank you,  Lakesha Ventura RN [facility] [phone]

## 2025-05-28 NOTE — LETTER
May 29, 2025    NATASHA LOUIS  [address]   [place] [address]        Dear Natasha:    As a member of [facility]'s MSC+ program, we offer a health risk assessment at no cost to you. I know you don't want to have the assessment right now. If you change your mind, please call me at the number below.    Who performs the health risk assessment?  A [facility] Care Coordinator performs the assessment. The assessment helps to identify your health and wellness goals. Our care coordinators can also help you understand your benefits, find primary health care providers and arrange transportation for medical care.    Our Care Coordinators are here:  When you need services set up in your home   To help you when your health changes or you're hospitalized  To give you information on staying healthy, preventing falls and immunizations    Questions?  If you have questions, or would like to do the assessment, call me at [phone]. TTY users call [phone].      Sincerely,        Lakesha Ventura RN, BSN, PHN  [phone]  [email or web address]              [id number]  [id number] (12/2024)

## 2025-05-29 NOTE — PROGRESS NOTES
"[facility] Care Coordination Contact    Per CC, mailed client a \"Refusal of Home Visit\" letter and uploaded Refusal POC to DENISHA Zamarripa    Care Management Specialist   [facility]  [phone]       "

## 2025-05-29 NOTE — PROGRESS NOTES
[facility] Care Coordination Contact      [facility] Refusal Telephone Assessment    Member refused home visit HRA on 5/28/25 (reason: Hoda states she she doesn't want an assessment because she doesn't need anything and goes to many appointments with her providers).    ER visits: Yes -  [facility]  Hospitalizations: No  Health concerns: Currently getting infusions for breast cancer  Falls/Injuries: No  ADL/IADL Dependencies: Denies        Member currently receiving the following home care services:     Member currently receiving the following community resources:    Informal support(s):      Advanced Care Planning discussion, complete code section.    Health Plan sponsored benefits: [employer] MSC+: Shared information regarding preventative health screening and health plan supplemental benefits/incentives. Reviewed medication disposal form and transition of care member handout.    Follow-Up Plan: Member informed of future contact, plan to f/u with member with a 6 month telephone assessment and offer a home visit.  Contact information shared with member and family, encouraged member to call with any questions or concerns at any time.    This CC note routed to PCP, Laury Dee RN  [facility]  [phone]

## 2025-06-05 ENCOUNTER — APPOINTMENT (OUTPATIENT)
Dept: LAB | Facility: CLINIC | Age: 70
End: 2025-06-05
Attending: INTERNAL MEDICINE
Payer: COMMERCIAL

## 2025-06-05 ENCOUNTER — INFUSION THERAPY VISIT (OUTPATIENT)
Dept: ONCOLOGY | Facility: CLINIC | Age: 70
End: 2025-06-05
Attending: INTERNAL MEDICINE
Payer: COMMERCIAL

## 2025-06-05 VITALS
DIASTOLIC BLOOD PRESSURE: 75 MMHG | RESPIRATION RATE: 16 BRPM | OXYGEN SATURATION: 95 % | SYSTOLIC BLOOD PRESSURE: 110 MMHG | HEART RATE: 72 BPM | WEIGHT: 178.8 LBS | TEMPERATURE: 97.9 F | BODY MASS INDEX: 31.67 KG/M2

## 2025-06-05 DIAGNOSIS — C50.912 MALIGNANT NEOPLASM OF LEFT FEMALE BREAST, UNSPECIFIED ESTROGEN RECEPTOR STATUS, UNSPECIFIED SITE OF BREAST (H): Primary | ICD-10-CM

## 2025-06-05 DIAGNOSIS — C50.412 CARCINOMA OF UPPER-OUTER QUADRANT OF LEFT BREAST IN FEMALE, ESTROGEN RECEPTOR POSITIVE (H): ICD-10-CM

## 2025-06-05 DIAGNOSIS — Z17.0 CARCINOMA OF UPPER-OUTER QUADRANT OF LEFT BREAST IN FEMALE, ESTROGEN RECEPTOR POSITIVE (H): ICD-10-CM

## 2025-06-05 LAB
ALBUMIN SERPL BCG-MCNC: 4.1 G/DL (ref 3.5–5.2)
ALP SERPL-CCNC: 55 U/L (ref 40–150)
ALT SERPL W P-5'-P-CCNC: 87 U/L (ref 0–50)
ANION GAP SERPL CALCULATED.3IONS-SCNC: 10 MMOL/L (ref 7–15)
AST SERPL W P-5'-P-CCNC: 101 U/L (ref 0–45)
BASOPHILS # BLD AUTO: 0 10E3/UL (ref 0–0.2)
BASOPHILS NFR BLD AUTO: 0 %
BILIRUB SERPL-MCNC: 0.3 MG/DL
BUN SERPL-MCNC: 14.6 MG/DL (ref 8–23)
CALCIUM SERPL-MCNC: 8.9 MG/DL (ref 8.8–10.4)
CEA SERPL-MCNC: 1.1 NG/ML
CHLORIDE SERPL-SCNC: 106 MMOL/L (ref 98–107)
CREAT SERPL-MCNC: 0.76 MG/DL (ref 0.51–0.95)
EGFRCR SERPLBLD CKD-EPI 2021: 84 ML/MIN/1.73M2
EOSINOPHIL # BLD AUTO: 0.2 10E3/UL (ref 0–0.7)
EOSINOPHIL NFR BLD AUTO: 3 %
ERYTHROCYTE [DISTWIDTH] IN BLOOD BY AUTOMATED COUNT: 13.7 % (ref 10–15)
GLUCOSE SERPL-MCNC: 91 MG/DL (ref 70–99)
HCO3 SERPL-SCNC: 25 MMOL/L (ref 22–29)
HCT VFR BLD AUTO: 39.3 % (ref 35–47)
HGB BLD-MCNC: 12.2 G/DL (ref 11.7–15.7)
IMM GRANULOCYTES # BLD: 0 10E3/UL
IMM GRANULOCYTES NFR BLD: 0 %
LYMPHOCYTES # BLD AUTO: 2.9 10E3/UL (ref 0.8–5.3)
LYMPHOCYTES NFR BLD AUTO: 38 %
MCH RBC QN AUTO: 28.8 PG (ref 26.5–33)
MCHC RBC AUTO-ENTMCNC: 31 G/DL (ref 31.5–36.5)
MCV RBC AUTO: 93 FL (ref 78–100)
MONOCYTES # BLD AUTO: 0.5 10E3/UL (ref 0–1.3)
MONOCYTES NFR BLD AUTO: 7 %
NEUTROPHILS # BLD AUTO: 3.9 10E3/UL (ref 1.6–8.3)
NEUTROPHILS NFR BLD AUTO: 52 %
NRBC # BLD AUTO: 0 10E3/UL
NRBC BLD AUTO-RTO: 0 /100
PLATELET # BLD AUTO: 221 10E3/UL (ref 150–450)
POTASSIUM SERPL-SCNC: 4.4 MMOL/L (ref 3.4–5.3)
PROT SERPL-MCNC: 7.6 G/DL (ref 6.4–8.3)
RBC # BLD AUTO: 4.24 10E6/UL (ref 3.8–5.2)
SODIUM SERPL-SCNC: 141 MMOL/L (ref 135–145)
WBC # BLD AUTO: 7.5 10E3/UL (ref 4–11)

## 2025-06-05 PROCEDURE — 250N000011 HC RX IP 250 OP 636: Performed by: INTERNAL MEDICINE

## 2025-06-05 PROCEDURE — 258N000003 HC RX IP 258 OP 636: Performed by: INTERNAL MEDICINE

## 2025-06-05 PROCEDURE — 86300 IMMUNOASSAY TUMOR CA 15-3: CPT

## 2025-06-05 PROCEDURE — 80051 ELECTROLYTE PANEL: CPT

## 2025-06-05 PROCEDURE — 36591 DRAW BLOOD OFF VENOUS DEVICE: CPT

## 2025-06-05 PROCEDURE — 85018 HEMOGLOBIN: CPT

## 2025-06-05 PROCEDURE — 82378 CARCINOEMBRYONIC ANTIGEN: CPT

## 2025-06-05 RX ORDER — HEPARIN SODIUM (PORCINE) LOCK FLUSH IV SOLN 100 UNIT/ML 100 UNIT/ML
5 SOLUTION INTRAVENOUS
Status: DISCONTINUED | OUTPATIENT
Start: 2025-06-05 | End: 2025-06-05 | Stop reason: HOSPADM

## 2025-06-05 RX ORDER — HEPARIN SODIUM (PORCINE) LOCK FLUSH IV SOLN 100 UNIT/ML 100 UNIT/ML
500 SOLUTION INTRAVENOUS ONCE
Status: COMPLETED | OUTPATIENT
Start: 2025-06-05 | End: 2025-06-05

## 2025-06-05 RX ADMIN — Medication 500 UNITS: at 12:13

## 2025-06-05 RX ADMIN — Medication 5 ML: at 13:37

## 2025-06-05 RX ADMIN — SODIUM CHLORIDE 500 MG: 0.9 INJECTION, SOLUTION INTRAVENOUS at 13:07

## 2025-06-05 ASSESSMENT — PAIN SCALES - GENERAL: PAINLEVEL_OUTOF10: NO PAIN (0)

## 2025-06-05 NOTE — PROGRESS NOTES
Infusion Nursing Note:  Hoda Brush presents today for Cycle 7 Day 1 Trastuzumab-qyyp.    Patient spoke with provider today: No    Treatment Conditions:  Component      Latest Ref Rng 6/5/2025  12:12 PM   WBC      4.0 - 11.0 10e3/uL 7.5    RBC Count      3.80 - 5.20 10e6/uL 4.24    Hemoglobin      11.7 - 15.7 g/dL 12.2    Hematocrit      35.0 - 47.0 % 39.3    MCV      78 - 100 fL 93    MCH      26.5 - 33.0 pg 28.8    MCHC      31.5 - 36.5 g/dL 31.0 (L)    RDW      10.0 - 15.0 % 13.7    Platelet Count      150 - 450 10e3/uL 221    % Neutrophils      % 52    % Lymphocytes      % 38    % Monocytes      % 7    % Eosinophils      % 3    % Basophils      % 0    % Immature Granulocytes      % 0    NRBC/W      <1 /100 0    Absolute Neutrophil      1.6 - 8.3 10e3/uL 3.9    Absolute Lymphocytes      0.8 - 5.3 10e3/uL 2.9    Absolute Monocytes      0.0 - 1.3 10e3/uL 0.5    Absolute Eosinophils      0.0 - 0.7 10e3/uL 0.2    Absolute Basophils      0.0 - 0.2 10e3/uL 0.0    Absolute Immature Granulocytes      <=0.4 10e3/uL 0.0    Absolute NRBCs      10e3/uL 0.0    Sodium      135 - 145 mmol/L 141    Potassium      3.4 - 5.3 mmol/L 4.4    Carbon Dioxide (CO2)      22 - 29 mmol/L 25    Anion Gap      7 - 15 mmol/L 10    Urea Nitrogen      8.0 - 23.0 mg/dL 14.6    Creatinine      0.51 - 0.95 mg/dL 0.76    GFR Estimate      >60 mL/min/1.73m2 84    Calcium      8.8 - 10.4 mg/dL 8.9    Chloride      98 - 107 mmol/L 106    Glucose      70 - 99 mg/dL 91    Alkaline Phosphatase      40 - 150 U/L 55    AST      0 - 45 U/L 101 (H)    ALT      0 - 50 U/L 87 (H)    Protein Total      6.4 - 8.3 g/dL 7.6    Albumin      3.5 - 5.2 g/dL 4.1    Bilirubin Total      <=1.2 mg/dL 0.3       Legend:  (L) Low  (H) High      Note: ECHO/MUGA completed 3/13/25 EF 55-60%.     No concerns today. Denies fever/chills, SOB or cough.    Intravenous Access:  Implanted Port.    Post Infusion Assessment:  Patient tolerated infusion without  incident.  Blood return noted pre and post infusion.  Access discontinued per protocol.    Discharge Plan:   Patient declined prescription refills.  Discharge instructions reviewed with: Patient.  Patient and/or family verbalized understanding of discharge instructions and all questions answered.  AVS to patient via [employer].  Patient will return 6/26/25 for next appointment.   Patient discharged in stable condition accompanied by: self.  Departure Mode: Ambulatory.      Linda Navarro RN

## 2025-06-19 SDOH — HEALTH STABILITY: PHYSICAL HEALTH: ON AVERAGE, HOW MANY DAYS PER WEEK DO YOU ENGAGE IN MODERATE TO STRENUOUS EXERCISE (LIKE A BRISK WALK)?: 7 DAYS

## 2025-06-19 ASSESSMENT — SOCIAL DETERMINANTS OF HEALTH (SDOH): HOW OFTEN DO YOU GET TOGETHER WITH FRIENDS OR RELATIVES?: TWICE A WEEK

## 2025-06-24 ENCOUNTER — OFFICE VISIT (OUTPATIENT)
Dept: FAMILY MEDICINE | Facility: CLINIC | Age: 70
End: 2025-06-24
Payer: COMMERCIAL

## 2025-06-24 VITALS
BODY MASS INDEX: 32.65 KG/M2 | WEIGHT: 177.4 LBS | HEIGHT: 62 IN | DIASTOLIC BLOOD PRESSURE: 76 MMHG | TEMPERATURE: 98.1 F | HEART RATE: 76 BPM | RESPIRATION RATE: 16 BRPM | OXYGEN SATURATION: 99 % | SYSTOLIC BLOOD PRESSURE: 118 MMHG

## 2025-06-24 DIAGNOSIS — R73.09 ELEVATED GLUCOSE: ICD-10-CM

## 2025-06-24 DIAGNOSIS — C50.919 PRIMARY MALIGNANT NEOPLASM OF BREAST WITH METASTASIS (H): ICD-10-CM

## 2025-06-24 DIAGNOSIS — Z90.12 S/P MASTECTOMY, LEFT: ICD-10-CM

## 2025-06-24 DIAGNOSIS — R91.8 PULMONARY NODULES: ICD-10-CM

## 2025-06-24 DIAGNOSIS — Z00.00 ROUTINE GENERAL MEDICAL EXAMINATION AT A HEALTH CARE FACILITY: Primary | ICD-10-CM

## 2025-06-24 DIAGNOSIS — K76.0 FATTY LIVER: ICD-10-CM

## 2025-06-24 DIAGNOSIS — Z91.09 MULTIPLE ENVIRONMENTAL ALLERGIES: ICD-10-CM

## 2025-06-24 DIAGNOSIS — C79.51 MALIGNANT NEOPLASM METASTATIC TO BONE (H): ICD-10-CM

## 2025-06-24 DIAGNOSIS — R74.8 ELEVATED LIVER ENZYMES: ICD-10-CM

## 2025-06-24 DIAGNOSIS — J31.0 CHRONIC RHINITIS: ICD-10-CM

## 2025-06-24 DIAGNOSIS — J34.89 INTERNAL NASAL LESION: ICD-10-CM

## 2025-06-24 DIAGNOSIS — Z80.3 FAMILY HISTORY OF MALIGNANT NEOPLASM OF BREAST: ICD-10-CM

## 2025-06-24 DIAGNOSIS — E66.811 CLASS 1 OBESITY WITH SERIOUS COMORBIDITY AND BODY MASS INDEX (BMI) OF 32.0 TO 32.9 IN ADULT, UNSPECIFIED OBESITY TYPE: ICD-10-CM

## 2025-06-24 DIAGNOSIS — E78.5 HYPERLIPIDEMIA, UNSPECIFIED HYPERLIPIDEMIA TYPE: ICD-10-CM

## 2025-06-24 DIAGNOSIS — J34.3 NASAL TURBINATE HYPERTROPHY: ICD-10-CM

## 2025-06-24 DIAGNOSIS — Z13.29 SCREENING FOR THYROID DISORDER: ICD-10-CM

## 2025-06-24 PROCEDURE — 1126F AMNT PAIN NOTED NONE PRSNT: CPT | Performed by: INTERNAL MEDICINE

## 2025-06-24 PROCEDURE — 99397 PER PM REEVAL EST PAT 65+ YR: CPT | Performed by: INTERNAL MEDICINE

## 2025-06-24 PROCEDURE — 3074F SYST BP LT 130 MM HG: CPT | Performed by: INTERNAL MEDICINE

## 2025-06-24 PROCEDURE — 99214 OFFICE O/P EST MOD 30 MIN: CPT | Mod: 25 | Performed by: INTERNAL MEDICINE

## 2025-06-24 PROCEDURE — 3078F DIAST BP <80 MM HG: CPT | Performed by: INTERNAL MEDICINE

## 2025-06-24 RX ORDER — FLUTICASONE PROPIONATE 50 MCG
1 SPRAY, SUSPENSION (ML) NASAL DAILY
Qty: 16 G | Refills: 1 | Status: SHIPPED | OUTPATIENT
Start: 2025-06-24

## 2025-06-24 RX ORDER — MUPIROCIN 2 %
OINTMENT (GRAM) TOPICAL 2 TIMES DAILY
Qty: 1 G | Refills: 0 | Status: SHIPPED | OUTPATIENT
Start: 2025-06-24

## 2025-06-24 ASSESSMENT — PAIN SCALES - GENERAL: PAINLEVEL_OUTOF10: NO PAIN (0)

## 2025-06-24 NOTE — PATIENT INSTRUCTIONS
Make appointment to discuss Wegovy if it gets approved by insurance  Schedule lab work  Make appointment with allergy specialist  Use Bactroban for 5 days  Use Flonase nasal spray  If no improvement then let me know and I will refer you to ENT    Follow up appointment in 2 weeks to discuss wegovy    Seek sooner medical attention if there is any worsening of symptoms or problems.          Patient Education   Preventive Care Advice   This is general advice given by our system to help you stay healthy. However, your care team may have specific advice just for you. Please talk to your care team about your preventive care needs.  Nutrition  Eat 5 or more servings of fruits and vegetables each day.  Try wheat bread, brown rice and whole grain pasta (instead of white bread, rice, and pasta).  Get enough calcium and vitamin D. Check the label on foods and aim for 100% of the RDA (recommended daily allowance).  Lifestyle  Exercise at least 150 minutes each week  (30 minutes a day, 5 days a week).  Do muscle strengthening activities 2 days a week. These help control your weight and prevent disease.  No smoking.  Wear sunscreen to prevent skin cancer.  Have a dental exam and cleaning every 6 months.  Yearly exams  See your health care team every year to talk about:  Any changes in your health.  Any medicines your care team has prescribed.  Preventive care, family planning, and ways to prevent chronic diseases.  Shots (vaccines)   HPV shots (up to age 26), if you've never had them before.  Hepatitis B shots (up to age 59), if you've never had them before.  COVID-19 shot: Get this shot when it's due.  Flu shot: Get a flu shot every year.  Tetanus shot: Get a tetanus shot every 10 years.  Pneumococcal, hepatitis A, and RSV shots: Ask your care team if you need these based on your risk.  Shingles shot (for age 50 and up)  General health tests  Diabetes screening:  Starting at age 35, Get screened for diabetes at least every 3  years.  If you are younger than age 35, ask your care team if you should be screened for diabetes.  Cholesterol test: At age 39, start having a cholesterol test every 5 years, or more often if advised.  Bone density scan (DEXA): At age 50, ask your care team if you should have this scan for osteoporosis (brittle bones).  Hepatitis C: Get tested at least once in your life.  STIs (sexually transmitted infections)  Before age 24: Ask your care team if you should be screened for STIs.  After age 24: Get screened for STIs if you're at risk. You are at risk for STIs (including HIV) if:  You are sexually active with more than one person.  You don't use condoms every time.  You or a partner was diagnosed with a sexually transmitted infection.  If you are at risk for HIV, ask about PrEP medicine to prevent HIV.  Get tested for HIV at least once in your life, whether you are at risk for HIV or not.  Cancer screening tests  Cervical cancer screening: If you have a cervix, begin getting regular cervical cancer screening tests starting at age 21.  Breast cancer scan (mammogram): If you've ever had breasts, begin having regular mammograms starting at age 40. This is a scan to check for breast cancer.  Colon cancer screening: It is important to start screening for colon cancer at age 45.  Have a colonoscopy test every 10 years (or more often if you're at risk) Or, ask your provider about stool tests like a FIT test every year or Cologuard test every 3 years.  To learn more about your testing options, visit:   .  For help making a decision, visit:   https://bit.ly/it15418.  Prostate cancer screening test: If you have a prostate, ask your care team if a prostate cancer screening test (PSA) at age 55 is right for you.  Lung cancer screening: If you are a current or former smoker ages 50 to 80, ask your care team if ongoing lung cancer screenings are right for you.  For informational purposes only. Not to replace the advice of your  health care provider. Copyright   2023 [facility]. All rights reserved. Clinically reviewed by the [facility] Transitions Program. [employer] 808449 - REV 01/24.

## 2025-06-24 NOTE — PROGRESS NOTES
Preventive Care Visit  [facility]  Laury Dee MD, Internal Medicine  Jun 24, 2025      Assessment & Plan     Hoda was seen today for physical.    Diagnoses and all orders for this visit:    Routine general medical examination at a health care facility  Preventive health counseling was also done.  Counseled on healthy eating and physical activity  Last colonoscopy on 2/1/2018 which was normal and will repeat in 10 years.   Last endoscopuy on 2/2020 shows small hiatal hernia.   Last DEXA on 7/14/23 shows osteoporosis  Advised to take adequate calcium, vitamin D and do weight bearing exercises.   No longer gets mammogram because of mastectomy.   Pt declined Covid booster today.  Pt states she has advanced directive and living will.        Hyperlipidemia, unspecified hyperlipidemia type  -     Lipid panel reflex to direct LDL Non-fasting; Future  -     semaglutide-weight management (WEGOVY) 0.25 MG/0.5ML pen; Inject 0.5 mLs (0.25 mg) subcutaneously once a week.    S/P mastectomy, left  Past history due to breast cancer.   Followed by oncology.     Metastatic breast cancer  Reviewed 5/15/25 oncology note which shows pt continues to do well on combination of trastuzumab and letrozole.  She gets Xgeva every 3 months.  She will continue with every 4 month CT of chest abdomen and pelvis.    Fatty liver  -     semaglutide-weight management (WEGOVY) 0.25 MG/0.5ML pen; Inject 0.5 mLs (0.25 mg) subcutaneously once a week.  Seen on 12/18/2023 chest CT  Counseled pt on healthy eating and physical.  Discussed weight loss medication and orders were placed.  If insurance covers this medication pt will f/up with me for further instructions on using this medication.    Family history of malignant neoplasm of breast  In her mother    Pulmonary nodules  Seen on 5/14/25   In the right middle lobe and left lower lobe are unchanged.     Bone metastasis  5/14/25 chest CT shows scattered sclerotic bony metastases  are not significantly changed.     Elevated liver enzymes  She has fatty liver  Counseled pt on healthy eating and physical.  Discussed weight loss medication and orders were placed.  If insurance covers this medication pt will f/up with me for further instructions on using this medication.    Screening for thyroid disorder  -     TSH with free T4 reflex; Future    Elevated glucose  -     Hemoglobin A1c; Future    Class 1 obesity with serious comorbidity and body mass index (BMI) of 32.0 to 32.9 in adult, unspecified obesity type  -     semaglutide-weight management (WEGOVY) 0.25 MG/0.5ML pen; Inject 0.5 mLs (0.25 mg) subcutaneously once a week.  Pt has fatty liver, elevated liver enzymes and class 1 obesity.  Counseled pt on importance of weight loss d/t multiple diagnosis.  Counseled on healthy eating and physical activity.  Discussed that there are weight loss medications and if she is interested in these options she can schedule virtual appointment with me to discuss further.    We can try to see if insurance can cover this via prior authorization due to medical necessity.   Pt agreed to have prescription sent to pharmacy and if approved by insurance she should see me for further explanation on using medication.    Multiple environmental allergies  -     Adult Allergy/Asthma  Referral; Future  Pt reports allergies symptoms including itching and red spot on hand.   Pt had tried saline nasal spray but her symptoms are not completely resolved.   Discussed that if pt wants testing done she will need to see allergy specialist.  Pt agreed to receive referral.     Nasal turbinate hypertrophy  -     fluticasone (FLONASE) 50 MCG/ACT nasal spray; Spray 1 spray into both nostrils daily.  Pt reports crust in her nose and she states it sometimes bleed.  Physical exam shows turbinate hypertrophy.   Advised pt to use saline nasal spray or Vaseline.   Discussed that we can prescribe Flonase nasal spray and Bactroban  "ointment.  Pt agreed to take and If symptoms do no improve we can refer her to ENT specialist.     Internal nasal lesion crusted  -     mupirocin (BACTROBAN) 2 % external ointment; Apply topically 2 times daily.  See above    Chronic rhinitis  -     fluticasone (FLONASE) 50 MCG/ACT nasal spray; Spray 1 spray into both nostrils daily.    Other orders  -     REVIEW OF HEALTH MAINTENANCE PROTOCOL ORDERS    Other  She takes pantoprazole for acid reflux.   She has not stopped taking  Pt is retired.     Patient has been advised of split billing requirements and indicates understanding: Yes        BMI  Estimated body mass index is 32.45 kg/m  as calculated from the following:    Height as of this encounter: 1.575 m (5' 2\").    Weight as of this encounter: 80.5 kg (177 lb 6.4 oz).   Weight management plan: Discussed healthy diet and exercise guidelines  Reviewed preventive health counseling, as reflected in patient instructions       Regular exercise       Healthy diet/nutrition  Counseling  Appropriate preventive services were addressed with this patient via screening, questionnaire, or discussion as appropriate for fall prevention, nutrition, physical activity, Tobacco-use cessation, social engagement, weight loss and cognition.  Checklist reviewing preventive services available has been given to the patient.  Reviewed patient's diet, addressing concerns and/or questions.     Pt will f/up on 7/15/25    Subjective   Hoda is a 70 year old, presenting for the following:  Physical        6/24/2025    10:44 AM   Additional Questions   Roomed by Nereida CHEW MA          PELON Drummond is a 70 year old, presenting for the following:  Physical     Advance Care Planning    Document on file is a Health Care Directive or POLST.        6/19/2025   General Health   How would you rate your overall physical health? Good   Feel stress (tense, anxious, or unable to sleep) Not at all         6/19/2025   Nutrition   Diet: Low fat/cholesterol "         6/19/2025   Exercise   Days per week of moderate/strenous exercise 7 days         6/19/2025   Social Factors   Frequency of gathering with friends or relatives Twice a week   Worry food won't last until get money to buy more No   Food not last or not have enough money for food? No   Do you have housing? (Housing is defined as stable permanent housing and does not include staying outside in a car, in a tent, in an abandoned building, in an overnight shelter, or couch-surfing.) Yes   Are you worried about losing your housing? No   Lack of transportation? No   Unable to get utilities (heat,electricity)? No         6/19/2025   Fall Risk   Fallen 2 or more times in the past year? No   Trouble with walking or balance? No          6/19/2025   Activities of Daily Living- Home Safety   Needs help with the following daily activites None of the above   Safety concerns in the home None of the above         6/19/2025   Dental   Dentist two times every year? Yes         6/19/2025   Hearing Screening   Hearing concerns? None of the above         6/19/2025   Driving Risk Screening   Patient/family members have concerns about driving No         6/19/2025   General Alertness/Fatigue Screening   Have you been more tired than usual lately? No         6/19/2025   Urinary Incontinence Screening   Bothered by leaking urine in past 6 months No         Today's PHQ-2 Score:       6/23/2025    12:18 PM   PHQ-2 ( 1999 Pf[employer])   Q1: Little interest or pleasure in doing things 0   Q2: Feeling down, depressed or hopeless 0   PHQ-2 Score 0    Q1: Little interest or pleasure in doing things Not at all   Q2: Feeling down, depressed or hopeless Not at all   PHQ-2 Score 0       Patient-reported           6/19/2025   Substance Use   Alcohol more than 3/day or more than 7/wk Not Applicable   Do you have a current opioid prescription? No   How severe/bad is pain from 1 to 10? 5/10   Do you use any other substances recreationally? No     Social  History     Tobacco Use    Smoking status: Never     Passive exposure: Never    Smokeless tobacco: Never   Vaping Use    Vaping status: Never Used   Substance Use Topics    Alcohol use: No    Drug use: No          History of abnormal Pap smear: no        Latest Ref Rng & Units 8/8/2017     2:25 PM 8/8/2017    12:44 PM   PAP / HPV   PAP (Historical)   NIL    HPV 16 DNA NEG Negative     HPV 18 DNA NEG Negative     Other HR HPV NEG Negative       ASCVD Risk   The 10-year ASCVD risk score (Winter SIMENTAL, et al., 2019) is: 7.9%    Values used to calculate the score:      Age: 70 years      Sex: Female      Is Non- : No      Diabetic: No      Tobacco smoker: No      Systolic Blood Pressure: 118 mmHg      Is BP treated: No      HDL Cholesterol: 45 mg/dL      Total Cholesterol: 216 mg/dL        Reviewed and updated as needed this visit by Provider    Allergies  Meds  Problems             Current providers sharing in care for this patient include:  Patient Care Team:  Laury Dee MD as PCP - General (Internal Medicine)  Lisandro Aguiar MD as MD (Oncology)  Laury Dee MD as Assigned PCP  Laury Dee MD as Referring Physician (Internal Medicine)  Gwen Walters APRN CNP as Nurse Practitioner (Dermatology)  Lakesha Ventura RN as Lead Care Coordinator (Primary Care - CC)  Chiquis Gomez PA-C as Physician Assistant (Dermatology)  Gwen Kim NP as Nurse Practitioner (Pulmonary Disease)  Gwen Kim NP as Assigned Pulmonology Provider  Holley Armijo RN as Specialty Care Coordinator (Hematology & Oncology)  Codey Burr MD as MD (Radiation Oncology)  Jaime Coulter MD as Assigned Musculoskeletal Provider  Dallin Jimenez DPM as Assigned Surgical Provider  Chiquis Gomez PA-C as Assigned Dermatology Provider  Lisandro Aguiar MD as Assigned Cancer Care Provider    The following health maintenance items are  "reviewed in Epic and correct as of today:  Health Maintenance   Topic Date Due    ASTHMA ACTION PLAN  Never done    RSV VACCINE (1 - Risk 60-74 years 1-dose series) Never done    COVID-19 VACCINE (7 - 2024-25 season) 05/15/2025    LIPID  05/16/2025    ASTHMA CONTROL TEST  11/07/2025    MEDICARE ANNUAL WELLNESS VISIT  06/24/2026    ANNUAL REVIEW OF HM ORDERS  06/24/2026    FALL RISK ASSESSMENT  06/24/2026    COLORECTAL CANCER SCREENING  02/01/2028    DIABETES SCREENING  06/05/2028    ADVANCE CARE PLANNING  06/24/2030    DTAP/TDAP/TD VACCINE (2 - Td or Tdap) 07/21/2030    DEXA  07/14/2038    HEPATITIS C SCREENING  Completed    PHQ-2 (once per calendar year)  Completed    INFLUENZA VACCINE  Completed    PNEUMOCOCCAL VACCINE 50+ YEARS  Completed    ZOSTER VACCINE  Completed    HPV VACCINE  Aged Out    MENINGITIS VACCINE  Aged Out    PAP  Discontinued         Review of Systems  Constitutional, HEENT, cardiovascular, pulmonary, GI, , musculoskeletal, neuro, skin, endocrine and psych systems are negative, except as otherwise noted.     Objective    Exam  /76 (BP Location: Right arm, Patient Position: Sitting, Cuff Size: Adult Large)   Pulse 76   Temp 98.1  F (36.7  C) (Oral)   Resp 16   Ht 1.575 m (5' 2\")   Wt 80.5 kg (177 lb 6.4 oz)   SpO2 99%   BMI 32.45 kg/m     Estimated body mass index is 32.45 kg/m  as calculated from the following:    Height as of this encounter: 1.575 m (5' 2\").    Weight as of this encounter: 80.5 kg (177 lb 6.4 oz).    Physical Exam    GENERAL APPEARANCE: healthy, alert and no distress  EYES: Eyes grossly normal to inspection, PERRL and conjunctivae and sclerae normal  HENT: ear canals and TM's normal, nose and mouth without ulcers or lesions, oropharynx clear and oral mucous membranes moist Turbinate hypertrophy on right side,   NECK: no adenopathy,   RESP: lungs clear to auscultation - no rales, rhonchi or wheezes  BREAST: normal without masses, tenderness or nipple discharge and " no palpable axillary masses or adenopathy Left mastectomy  CV: regular rate and rhythm, normal S1 S2, no S3   ABDOMEN: soft, nontender, no hepatosplenomegaly, no masses and bowel sounds normal  MS: no musculoskeletal defects are noted and gait is age appropriate without ataxia.   SKIN: no suspicious lesions or rashes.  Hyperpigmented spot on chest  NEUROy normal, mentation intact and speech normal  PSYCH: mentation appears normal and affect normal/bright         6/24/2025   Mini Cog   Clock Draw Score 2 Normal   3 Item Recall 3 objects recalled   Mini Cog Total Score 5         Labs pending    Signed Electronically by: Laury Dee MD  Scribe Disclosure:   Kate KIMBLE, am serving as a scribe; to document services personally performed by Laury Dee MD -based on data collection and the provider's statements to me.

## 2025-06-25 ENCOUNTER — PATIENT OUTREACH (OUTPATIENT)
Dept: CARE COORDINATION | Facility: CLINIC | Age: 70
End: 2025-06-25
Payer: COMMERCIAL

## 2025-06-26 ENCOUNTER — INFUSION THERAPY VISIT (OUTPATIENT)
Dept: ONCOLOGY | Facility: CLINIC | Age: 70
End: 2025-06-26
Attending: INTERNAL MEDICINE
Payer: COMMERCIAL

## 2025-06-26 ENCOUNTER — APPOINTMENT (OUTPATIENT)
Dept: LAB | Facility: CLINIC | Age: 70
End: 2025-06-26
Attending: INTERNAL MEDICINE
Payer: COMMERCIAL

## 2025-06-26 ENCOUNTER — RESULTS FOLLOW-UP (OUTPATIENT)
Dept: FAMILY MEDICINE | Facility: CLINIC | Age: 70
End: 2025-06-26

## 2025-06-26 VITALS
TEMPERATURE: 98 F | OXYGEN SATURATION: 94 % | HEART RATE: 70 BPM | BODY MASS INDEX: 32.81 KG/M2 | WEIGHT: 179.4 LBS | SYSTOLIC BLOOD PRESSURE: 116 MMHG | RESPIRATION RATE: 16 BRPM | DIASTOLIC BLOOD PRESSURE: 64 MMHG

## 2025-06-26 DIAGNOSIS — Z13.29 SCREENING FOR THYROID DISORDER: ICD-10-CM

## 2025-06-26 DIAGNOSIS — E78.5 HYPERLIPIDEMIA, UNSPECIFIED HYPERLIPIDEMIA TYPE: ICD-10-CM

## 2025-06-26 DIAGNOSIS — C50.912 MALIGNANT NEOPLASM OF LEFT FEMALE BREAST, UNSPECIFIED ESTROGEN RECEPTOR STATUS, UNSPECIFIED SITE OF BREAST (H): Primary | ICD-10-CM

## 2025-06-26 DIAGNOSIS — R73.09 ELEVATED GLUCOSE: ICD-10-CM

## 2025-06-26 LAB
ALBUMIN SERPL BCG-MCNC: 4 G/DL (ref 3.5–5.2)
ALP SERPL-CCNC: 51 U/L (ref 40–150)
ALT SERPL W P-5'-P-CCNC: 72 U/L (ref 0–50)
ANION GAP SERPL CALCULATED.3IONS-SCNC: 10 MMOL/L (ref 7–15)
AST SERPL W P-5'-P-CCNC: 66 U/L (ref 0–45)
BASOPHILS # BLD AUTO: 0 10E3/UL (ref 0–0.2)
BASOPHILS NFR BLD AUTO: 1 %
BILIRUB SERPL-MCNC: 0.3 MG/DL
BUN SERPL-MCNC: 16.7 MG/DL (ref 8–23)
CALCIUM SERPL-MCNC: 9.1 MG/DL (ref 8.8–10.4)
CEA SERPL-MCNC: 1.1 NG/ML
CHLORIDE SERPL-SCNC: 105 MMOL/L (ref 98–107)
CHOLEST SERPL-MCNC: 202 MG/DL
CREAT SERPL-MCNC: 0.84 MG/DL (ref 0.51–0.95)
EGFRCR SERPLBLD CKD-EPI 2021: 75 ML/MIN/1.73M2
EOSINOPHIL # BLD AUTO: 0.2 10E3/UL (ref 0–0.7)
EOSINOPHIL NFR BLD AUTO: 3 %
ERYTHROCYTE [DISTWIDTH] IN BLOOD BY AUTOMATED COUNT: 13.5 % (ref 10–15)
EST. AVERAGE GLUCOSE BLD GHB EST-MCNC: 131 MG/DL
FASTING STATUS PATIENT QL REPORTED: YES
FASTING STATUS PATIENT QL REPORTED: YES
GLUCOSE SERPL-MCNC: 96 MG/DL (ref 70–99)
HBA1C MFR BLD: 6.2 %
HCO3 SERPL-SCNC: 25 MMOL/L (ref 22–29)
HCT VFR BLD AUTO: 39.1 % (ref 35–47)
HDLC SERPL-MCNC: 42 MG/DL
HGB BLD-MCNC: 12 G/DL (ref 11.7–15.7)
IMM GRANULOCYTES # BLD: 0 10E3/UL
IMM GRANULOCYTES NFR BLD: 0 %
LDLC SERPL CALC-MCNC: 136 MG/DL
LYMPHOCYTES # BLD AUTO: 2.7 10E3/UL (ref 0.8–5.3)
LYMPHOCYTES NFR BLD AUTO: 40 %
MCH RBC QN AUTO: 28.7 PG (ref 26.5–33)
MCHC RBC AUTO-ENTMCNC: 30.7 G/DL (ref 31.5–36.5)
MCV RBC AUTO: 94 FL (ref 78–100)
MONOCYTES # BLD AUTO: 0.4 10E3/UL (ref 0–1.3)
MONOCYTES NFR BLD AUTO: 7 %
NEUTROPHILS # BLD AUTO: 3.3 10E3/UL (ref 1.6–8.3)
NEUTROPHILS NFR BLD AUTO: 49 %
NONHDLC SERPL-MCNC: 160 MG/DL
NRBC # BLD AUTO: 0 10E3/UL
NRBC BLD AUTO-RTO: 0 /100
PLATELET # BLD AUTO: 212 10E3/UL (ref 150–450)
POTASSIUM SERPL-SCNC: 4.5 MMOL/L (ref 3.4–5.3)
PROT SERPL-MCNC: 7.5 G/DL (ref 6.4–8.3)
RBC # BLD AUTO: 4.18 10E6/UL (ref 3.8–5.2)
SODIUM SERPL-SCNC: 140 MMOL/L (ref 135–145)
TRIGL SERPL-MCNC: 119 MG/DL
TSH SERPL DL<=0.005 MIU/L-ACNC: 0.66 UIU/ML (ref 0.3–4.2)
WBC # BLD AUTO: 6.6 10E3/UL (ref 4–11)

## 2025-06-26 PROCEDURE — 82378 CARCINOEMBRYONIC ANTIGEN: CPT

## 2025-06-26 PROCEDURE — 250N000011 HC RX IP 250 OP 636: Performed by: INTERNAL MEDICINE

## 2025-06-26 PROCEDURE — 258N000003 HC RX IP 258 OP 636: Performed by: INTERNAL MEDICINE

## 2025-06-26 PROCEDURE — 80061 LIPID PANEL: CPT

## 2025-06-26 PROCEDURE — 84155 ASSAY OF PROTEIN SERUM: CPT

## 2025-06-26 PROCEDURE — 36591 DRAW BLOOD OFF VENOUS DEVICE: CPT

## 2025-06-26 PROCEDURE — 85004 AUTOMATED DIFF WBC COUNT: CPT

## 2025-06-26 PROCEDURE — 84443 ASSAY THYROID STIM HORMONE: CPT

## 2025-06-26 PROCEDURE — 83036 HEMOGLOBIN GLYCOSYLATED A1C: CPT

## 2025-06-26 RX ORDER — HEPARIN SODIUM (PORCINE) LOCK FLUSH IV SOLN 100 UNIT/ML 100 UNIT/ML
500 SOLUTION INTRAVENOUS ONCE
Status: COMPLETED | OUTPATIENT
Start: 2025-06-26 | End: 2025-06-26

## 2025-06-26 RX ORDER — HEPARIN SODIUM (PORCINE) LOCK FLUSH IV SOLN 100 UNIT/ML 100 UNIT/ML
5 SOLUTION INTRAVENOUS
Status: DISCONTINUED | OUTPATIENT
Start: 2025-06-26 | End: 2025-06-26 | Stop reason: HOSPADM

## 2025-06-26 RX ADMIN — Medication 500 UNITS: at 10:21

## 2025-06-26 RX ADMIN — Medication 5 ML: at 13:06

## 2025-06-26 RX ADMIN — SODIUM CHLORIDE 500 MG: 0.9 INJECTION, SOLUTION INTRAVENOUS at 12:31

## 2025-06-26 ASSESSMENT — PAIN SCALES - GENERAL: PAINLEVEL_OUTOF10: NO PAIN (0)

## 2025-06-26 NOTE — PATIENT INSTRUCTIONS
[facility] Triage and after hours / weekends / holidays:  [phone]    Please call the triage or after hours line if you experience a temperature greater than or equal to 100.4, shaking chills, have uncontrolled nausea, vomiting and/or diarrhea, dizziness, shortness of breath, chest pain, bleeding, unexplained bruising, or if you have any other new/concerning symptoms, questions or concerns.      If you are having any concerning symptoms or wish to speak to a provider before your next infusion visit, please call triage to notify them so we can adequately serve you.     If you need a refill on a narcotic prescription or other medication, please call before your infusion appointment.

## 2025-06-26 NOTE — PROGRESS NOTES
Infusion Nursing Note:  Hoda Brush presents today for Cycle 8 Day 1 TRASTuzumab.    Patient seen by provider today: No   present during visit today: Not Applicable.    Note: Sierra comes in feeling well for her infusion. She expresses no new concerns. She denies cough, fever, chills, skin changes, B/B concerns, and pain.     Intravenous Access:  Implanted Port.    Treatment Conditions:  Lab Results   Component Value Date    HGB 12.0 06/26/2025    WBC 6.6 06/26/2025    ANEU 3.3 06/26/2025     06/26/2025        Lab Results   Component Value Date     06/26/2025    POTASSIUM 4.5 06/26/2025    CR 0.84 06/26/2025    TAYLER 9.1 06/26/2025    BILITOTAL 0.3 06/26/2025    ALBUMIN 4.0 06/26/2025    ALT 72 (H) 06/26/2025    AST 66 (H) 06/26/2025       Results reviewed, labs MET treatment parameters, ok to proceed with treatment.  ECHO/MUGA completed 6/26  EF 55-60%.      Post Infusion Assessment:  Patient tolerated infusion without incident.  Site patent and intact, free from redness, edema or discomfort.  No evidence of extravasations.       Discharge Plan:   Patient declined prescription refills.  AVS to patient via [employer].  Patient will return 7/17 for next appointment.   Patient discharged in stable condition accompanied by: self.  Departure Mode: Ambulatory.      Cyn Dickey RN

## 2025-07-15 ENCOUNTER — HOSPITAL ENCOUNTER (OUTPATIENT)
Dept: BONE DENSITY | Facility: CLINIC | Age: 70
Discharge: HOME | End: 2025-07-15
Attending: INTERNAL MEDICINE
Payer: COMMERCIAL

## 2025-07-15 DIAGNOSIS — Z78.0 ASYMPTOMATIC POSTMENOPAUSAL STATUS: ICD-10-CM

## 2025-07-15 PROCEDURE — 77080 DXA BONE DENSITY AXIAL: CPT

## 2025-07-15 NOTE — PROGRESS NOTES
Virtual Visit Details    Type of service:  Video Visit     Originating Location (pt. Location): Home    Distant Location (provider location):  Off-site  Platform used for Video Visit: [facility]                        ONCOLOGY NOTE     Hoad Brush  Female, 69 year old, 1955  MRN: [id number]        Hoda is a 69 year old patient from [facility] and is seen here for continuation of care for her metastatic ER+HER2- breast cancer.  She is a refugee from [facility] and was initially seen in clinic with her daughter.  The only data we have from [facility] is an English translation of her records.       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  On 02/11/2014, she was seen by an oncologist.  The clinical staging of T4b N2 M1 was noted.   Her breast cancer was on the left side. There was no right breast cancer, confirmed by the patient and her daughter, correcting a possible error in the translated records.  ER was positive in 100% of the cells, HI at 14% and HER2 was 3+ positive.  It appears that this histopathologic information is on the mastectomy specimen. She underwent an MRI for staging of presumptive bone metastases which was performed 03/02/2014.  There were skeletal metastases in the thoracic vertebrae at 12, L1, L3, L5 and S1 vertebral body, ranging in size from 0.7-3.0 cm in size.  Ischial and right femur were also involved, as well as a large iliac mass measuring about 15 cm in the uterus. 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   Radiation Oncology consultation was performed 03/11/2014, and she was given radiation in 1 dose of 6 Gy to the right iliac lesion.        With the initial diagnosis, she initiated treatment with 6 cycles of CAF neoadjuvant chemotherapy 02/14, 07/07, 03/28, 04/18/14, 05/08 and 05/29/14. She also received monthly zoledronic acid.  She then underwent a modified left mastectomy of Palacios type and left  lymphadenoectomy on 06/27/2014.   Pathologic examination showed number at [facility] was [id number] showed infiltrative grade 2 ductal cancer with 6 level 1 metastatic lympFollow up with Jossie for visits and trastuzumab on 2-5, 2-26, 3-19 with CBC, CMP.  Echocardiogram on 3-19.  Follow up with me 4-9 with CBC, CMP, CA27.29 and CEA and with CT CAP on 4-8.h nodes and 3 level 2 metastatic lymph nodes.  The differentiation was intermediate.  The tumor was ER positive 70% of the cells, ND positive in 40% of the cells and HER2 was 3+ by immunohistochemistry and the Ki-67 labeling index was 20%. Her staging after surgery was stage IV, pT4b N2 M1.  I don't see a biopsy of the skeletal metastases.  She then had continuation with chemotherapy with 4 cycles of Herceptin and taxane with monthly zoledronic acid.  Tamoxifen was initiated.  She then had two years of Herceptin and a decision was made not to continue further Herceptin.  She continued on zoledronic acid every 3 months. She was clinically stable. [place] then moved to the [place] new refugee from [facility].  She has now been changed to letrozole.  Denosumab has now been held for new diagnosis of osteonecrosis of the R maxilla, resumed 2/2025 after a clear from a dentist.     History of cholecystectomy 2020.      TREATMENT HISTORY:  A. Initial diagnosis with metastatic breast cancer in [facility].  Neoadjuvant CAF x 6.   B. Left mastectomy. Left axillary node dissection.  C.  Radiation in 1 dose to R iliac region.  C. Herceptin for 2 years taxane for a prescribed course then stopped, monthly zoledronic acid.  She had 2 years of Herceptin with tamoxifen added after chemotherapy.  D.  Tamoxifen alone and zoledronic acid every 3 months starting 2014.  Letrozole was started   E.  [place]  We restarted Herceptin every 3 weeks and continued tamoxifen. Bone targeted agent changed to denosumab every 9 weeks. Zometa discontinued 2017.  Tamoxifen was changed to  letrozole August 2019.    F.  Xgeva discontinued 12-17-19 because of dental issues.   G.  J+J vaccine March, 2021.  H.  Was on Zometa once May 11.  Reaction with eye lid swelling. Discontinued Zometa.  I.  Restart Xgeva 6-22-21.  Continuing the trastuzumab and letrozole.  Both tolerated well.   J.  Discontinue Xgeva because of osteonecrosis of the maxilla.   Continue trastuzumab and letrozole.   K. Acute back pain. MR L spine 2/10/2025 with stable bone metastases without cord compression and negative PET in bone lesions. L1 with periosteal involvement. Continue trantuzumab/letrozole. Referral to rad onc, neurosurgery, and palliative. Sent liquid biopsy. Cleared by a dentist, resumed Xgeva 2/27/2025.    NGS  Genomic Findings  Biomarker Protein Change DNA Change  Variant  Frequency  Interpretation  DEWEY F2289R c.7247T>C 0.9 % Variant of Uncertain Significance  Other Results  BLOOD TMB (mut/Mb) : 1  For a list of mutations informing TMB, see appendix  MICROSATELLITE INSTABILITY : Not Detected  TUMOR FRACTION : 0.9 %  Analysis included these guideline-recommended biomarkers: AKT1, BRAF, BRCA1, BRCA2, ESR1, ERBB2 (HER2), NTRK1/2/3, PALB2, PIK3CA, PTEN,  RET  Incidental Findings* (Pathogenic & Likely Pathogenic Variants)  Clonal Hematopoiesis (CH)  Biomarker Protein Change DNA Change Variant Frequency Interpretation  TP53 H214R c.641A>G 0.7 % Pathogenic Variant      INTERVAL HISTORY  Hoda is seen in routine 2 month follow-up. She has been feeling well. She is tolerating treatment well without side effects. She has had some allergy symptoms and is going to see an allergist in September. She has intermittent PND, itchy eyes, nasal congestion, as well as had her hand turn red and puffy several months ago. She is taking claritin PRN for symptoms and has a nasal spray and eye drops to use PRN as well. No fevers/chills or recent infections. Appetite and energy are intact. No new or different pain. No SOB, edema, nausea, or  bowel difficulties.         PHYSICAL EXAMINATION:    There were no vitals taken for this visit.  Video physical exam  General: Patient appears well in no acute distress.   Skin: No visualized rash or lesions on visualized skin  Eyes: EOMI, no erythema, sclera icterus or discharge noted  Resp: Appears to be breathing comfortably without accessory muscle usage, speaking in full sentences, no cough  MSK: Appears to have normal range of motion based on visualized movements  Neurologic: No apparent tremors, facial movements symmetric  Psych: affect bright, alert and oriented     LABORATORY DATA:     06/26/25 10:19   Sodium 140   Potassium 4.5   Chloride 105   Carbon Dioxide (CO2) 25   Urea Nitrogen 16.7   Creatinine 0.84   GFR Estimate 75   Calcium 9.1   Anion Gap 10   Albumin 4.0   Protein Total 7.5   Alkaline Phosphatase 51   ALT 72 (H)   AST 66 (H)   Bilirubin Total 0.3   CEA 1.1   Cholesterol 202 (H)   Patient Fasting? Yes  Yes   Glucose 96   HDL Cholesterol 42 (L)   Estimated Average Glucose 131 (H)   Hemoglobin A1C 6.2 (H)   LDL Cholesterol Calculated 136 (H)   Non HDL Cholesterol 160 (H)   Triglycerides 119   TSH 0.66   WBC 6.6   Hemoglobin 12.0   Hematocrit 39.1   Platelet Count 212   RBC Count 4.18   MCV 94   MCH 28.7   MCHC 30.7 (L)   RDW 13.5   % Neutrophils 49   % Lymphocytes 40   % Monocytes 7   % Eosinophils 3   % Basophils 1   % Immature Granulocytes 0   NRBC/W 0   Absolute Neutrophil 3.3   Absolute Lymphocytes 2.7   Absolute Monocytes 0.4   Absolute Eosinophils 0.2   Absolute Basophils 0.0   Absolute Immature Granulocytes 0.0   Absolute NRBCs 0.0       CT CAP: May 14, 2025   IMPRESSION:  1.  Scattered sclerotic bony metastases are not significantly changed.  2.  Small pulmonary nodules in the right middle lobe and left lower lobe are unchanged.  3.  Hepatic steatosis.     EXAM: MR LUMBAR SPINE W/O & W CONTRAST  2/10/2025 5:53 PM      HISTORY:  severe acute onset midline back pain, known  spinal  metastasis; Low back pain; r/o Cancer; No known/automatically detected  potential contraindications to imaging        COMPARISON:  CT CAP 1/6/2025     TECHNIQUE: Sagittal T1-weighted and T2-weighted and axial T2-weighted  images of the lumbar spine were obtained without intravenous contrast.  Post intravenous contrast using gadolinium axial and sagittal  T1-weighted images were obtained with fat saturation.     CONTRAST: gadobutrol (GADAVIST) injection 8 mL.     FINDINGS:  There are 5 lumbar type vertebrae, used for the purposes of this  dictation. Conus tip at L1. Normal lumbar vertebral alignment.  Heterogeneous marrow signal of the lumbar spine and visualized  portions of the sacrum/pelvis, compatible with known diffuse  metastatic disease. Most conspicuous lesions in the L1 and L3  vertebral bodies, as well as the right iliac bone. Mild disc space  narrowing with loss of the intrinsic T2 signal at T12-L1 and L1-L2.  Normal cauda equina.     On a level by level basis, the findings are as follows:     L1-2: Circumferential disc osteophyte complex. Mild bilateral  neuroforaminal stenosis. Mild spinal canal stenosis.     L2-3: Circumferential disc bulge. Bilateral facet arthropathy. Mild  bilateral neural foraminal stenosis. No spinal canal stenosis.     L3-4: Bilateral facet arthropathy. Mild left neural foraminal  stenosis. Tiny incidental left perineural cyst. Osseous and  periarticular edema of the left facet joint.     L4-5: Bilateral facet arthropathy, right greater than left, and  ligamentum flavum hypertrophy. Mild bilateral neuroforaminal stenosis.  No spinal canal stenosis. Osseous and periarticular edema of the right  facet joint.     L5-S1: Circumferential disc bulge with annular fissuring in the  central zone. Bilateral facet arthropathy, left greater than right. No  spinal canal or neural foraminal stenosis.     The visualized paraspinous soft tissues are within normal limits.                                                                        IMPRESSION:  1. No acute fracture of the lumbar spine.  2. Multifocal metastases of the lumbar spine and visualized portions  of the sacrum/pelvis, similar to the CT 1/6/2025.  3. Multilevel lumbar spondylosis. No high-grade neuroforaminal or  spinal canal stenosis.  4. Active inflammatory arthropathy of the left L3-4 and right L4-5  facet joints.     I have personally reviewed the examination and initial interpretation  and I agree with the findings.     REBECCA OLMSTEAD MD     PET February 25, 2025  IMPRESSION:   In this patient with history of metastatic left breast cancer:  1. No metabolically active disease. Stable appearance of the sclerotic  lesions throughout the axial and appendicular skeleton.  2. Stable couple of 5 mm pulmonary nodules.  3. Mildly avid nonenlarged right cervical lymph nodes are likely  reactive.     I have personally reviewed the examination and initial interpretation  and I agree with the findings.     DARLING ANNA MD         ASSESSMENT AND PLAN:       Hoda Brush is a 70-year-old woman with a history of ER-positive, ID-positive, HER2 positive breast cancer.  She is from [facility] and came to live in the [place] with her daughter.  The tumor is ER positive, ID positive and HER2 positive.  She had metastatic disease at the time of presentation with bone-only metastases by report.  She underwent neoadjuvant CAF, had a left mastectomy, left axillary lymph node dissection, radiation to the right hip, which included the right iliac region where she had metastatic disease.  She was initially on tamoxifen but then was switched to letrozole.  She continues on this and every 3-week trastuzumab.  She has had no evidence of disease progression for the last 7 years and possible longer but we don't have detailed data from [facility].  Markers are low and stable.  We have continued Herceptin every 3 weeks as well as daily letrozole. CT  CAP in May showed stable pulmonary nodules. Next scan in September.   At risk for cardiomyopathy. Last echo 6/26/25 showed normal EF and strain. Dr. Aguiar has requested monitoring to continue every 3 months.   The right maxillary osteonecrosis has healed and she is able to continue with Xgeva every 3 months, resumed on 2/27/2025. She had a repeat DEXA scan done yesterday. Result is pending at time of visit but prelim report shows normal bone density in L spine.   NGS.  None actionable DEWEY mutation.    20 minutes spent on the date of the encounter doing chart review, review of test results, interpretation of tests, patient visit, and documentation     The longitudinal plan of care for the diagnosis(es)/condition(s) as documented were addressed during this visit. Due to the added complexity in care, I will continue to support Hoda in the subsequent management and with ongoing continuity of care.      Anne Amos PA-C

## 2025-07-16 ENCOUNTER — VIRTUAL VISIT (OUTPATIENT)
Dept: ONCOLOGY | Facility: CLINIC | Age: 70
End: 2025-07-16
Attending: INTERNAL MEDICINE
Payer: COMMERCIAL

## 2025-07-16 VITALS — BODY MASS INDEX: 32.57 KG/M2 | HEIGHT: 62 IN | WEIGHT: 177 LBS

## 2025-07-16 DIAGNOSIS — C50.919 PRIMARY MALIGNANT NEOPLASM OF BREAST WITH METASTASIS (H): Primary | ICD-10-CM

## 2025-07-16 DIAGNOSIS — C50.912 MALIGNANT NEOPLASM OF LEFT FEMALE BREAST, UNSPECIFIED ESTROGEN RECEPTOR STATUS, UNSPECIFIED SITE OF BREAST (H): ICD-10-CM

## 2025-07-16 DIAGNOSIS — C79.51 MALIGNANT NEOPLASM METASTATIC TO BONE (H): ICD-10-CM

## 2025-07-16 RX ORDER — LETROZOLE 2.5 MG/1
2.5 TABLET, FILM COATED ORAL DAILY
Qty: 90 TABLET | Refills: 3 | Status: SHIPPED | OUTPATIENT
Start: 2025-07-16

## 2025-07-16 ASSESSMENT — PAIN SCALES - GENERAL: PAINLEVEL_OUTOF10: NO PAIN (0)

## 2025-07-16 NOTE — NURSING NOTE
Current patient location: [address] [place] [address]   [facility] [address]    Is the patient currently in the state of [place]? YES    Visit mode: VIDEO    If the visit is dropped, the patient can be reconnected by:VIDEO VISIT: Text to cell phone:   Telephone Information:   Mobile [phone]           Will anyone else be joining the visit? NO  (If patient encounters technical issues they should call [phone]    Are changes needed to the allergy or medication list? Pt stated no changes to allergies and Pt stated no med changes    Are refills needed on medications prescribed by this physician? NO    Rooming Documentation:  Questionnaire(s) completed    Reason for visit: BERLIN HOPKINS       Patient assisted to bathroom. Back to bed in lowest position, call light in reach. Dinner tolerated well. No c/o pain at this time. Patient watching TV. No acute distress noted. Will continue to monitor.

## 2025-07-16 NOTE — LETTER
7/16/2025      Hoda Brush  [address]  [facility] [address]      Dear Colleague,    Thank you for referring your patient, Hoda Brush, to the [facility]. Please see a copy of my visit note below.    Virtual Visit Details    Type of service:  Video Visit     Originating Location (pt. Location): Home    Distant Location (provider location):  Off-site  Platform used for Video Visit: [facility]                        ONCOLOGY NOTE     Hoda Brush  Female, 69 year old, 1955  MRN: [id number]        Hoda is a 69 year old patient from [facility] and is seen here for continuation of care for her metastatic ER+HER2- breast cancer.  She is a refugee from [facility] and was initially seen in clinic with her daughter.  The only data we have from [facility] is an English translation of her records.       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  On 02/11/2014, she was seen by an oncologist.  The clinical staging of T4b N2 M1 was noted.   Her breast cancer was on the left side. There was no right breast cancer, confirmed by the patient and her daughter, correcting a possible error in the translated records.  ER was positive in 100% of the cells, MI at 14% and HER2 was 3+ positive.  It appears that this histopathologic information is on the mastectomy specimen. She underwent an MRI for staging of presumptive bone metastases which was performed 03/02/2014.  There were skeletal metastases in the thoracic vertebrae at 12, L1, L3, L5 and S1 vertebral body, ranging in size from 0.7-3.0 cm in size.  Ischial and right femur were also involved, as well as a large iliac mass measuring about 15 cm in the uterus. 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   Radiation Oncology consultation was performed 03/11/2014, and she was given radiation in 1 dose of 6 Gy to the right iliac lesion.        With  the initial diagnosis, she initiated treatment with 6 cycles of CAF neoadjuvant chemotherapy 02/14, 07/07, 03/28, 04/18/14, 05/08 and 05/29/14. She also received monthly zoledronic acid.  She then underwent a modified left mastectomy of Palacios type and left lymphadenoectomy on 06/27/2014.   Pathologic examination showed number at [facility] was [id number] showed infiltrative grade 2 ductal cancer with 6 level 1 metastatic lympFollow up with Jossie for visits and trastuzumab on 2-5, 2-26, 3-19 with CBC, CMP.  Echocardiogram on 3-19.  Follow up with me 4-9 with CBC, CMP, CA27.29 and CEA and with CT CAP on 4-8.h nodes and 3 level 2 metastatic lymph nodes.  The differentiation was intermediate.  The tumor was ER positive 70% of the cells, NH positive in 40% of the cells and HER2 was 3+ by immunohistochemistry and the Ki-67 labeling index was 20%. Her staging after surgery was stage IV, pT4b N2 M1.  I don't see a biopsy of the skeletal metastases.  She then had continuation with chemotherapy with 4 cycles of Herceptin and taxane with monthly zoledronic acid.  Tamoxifen was initiated.  She then had two years of Herceptin and a decision was made not to continue further Herceptin.  She continued on zoledronic acid every 3 months. She was clinically stable. [place] then [place] to the [place] new refugee from [facility].  She has now been changed to letrozole.  Denosumab has now been held for new diagnosis of osteonecrosis of the R maxilla, resumed 2/2025 after a clear from a dentist.     History of cholecystectomy 2020.      TREATMENT HISTORY:  A. Initial diagnosis with metastatic breast cancer in [facility].  Neoadjuvant CAF x 6.   B. Left mastectomy. Left axillary node dissection.  C.  Radiation in 1 dose to R iliac region.  C. Herceptin for 2 years taxane for a prescribed course then stopped, monthly zoledronic acid.  She had 2 years of Herceptin with tamoxifen added after chemotherapy.  D.  Tamoxifen alone and zoledronic acid  every 3 months starting 2014.  Letrozole was started   E.  [place]  We restarted Herceptin every 3 weeks and continued tamoxifen. Bone targeted agent changed to denosumab every 9 weeks. Zometa discontinued 2017.  Tamoxifen was changed to letrozole August 2019.    F.  Xgeva discontinued 12-17-19 because of dental issues.   G.  J+J vaccine March, 2021.  H.  Was on Zometa once May 11.  Reaction with eye lid swelling. Discontinued Zometa.  I.  Restart Xgeva 6-22-21.  Continuing the trastuzumab and letrozole.  Both tolerated well.   J.  Discontinue Xgeva because of osteonecrosis of the maxilla.   Continue trastuzumab and letrozole.   K. Acute back pain. MR L spine 2/10/2025 with stable bone metastases without cord compression and negative PET in bone lesions. L1 with periosteal involvement. Continue trantuzumab/letrozole. Referral to rad onc, neurosurgery, and palliative. Sent liquid biopsy. Cleared by a dentist, resumed Xgeva 2/27/2025.    NGS  Genomic Findings  Biomarker Protein Change DNA Change  Variant  Frequency  Interpretation  DEWEY X1238C c.7247T>C 0.9 % Variant of Uncertain Significance  Other Results  BLOOD TMB (mut/Mb) : 1  For a list of mutations informing TMB, see appendix  MICROSATELLITE INSTABILITY : Not Detected  TUMOR FRACTION : 0.9 %  Analysis included these guideline-recommended biomarkers: AKT1, BRAF, BRCA1, BRCA2, ESR1, ERBB2 (HER2), NTRK1/2/3, PALB2, PIK3CA, PTEN,  RET  Incidental Findings* (Pathogenic & Likely Pathogenic Variants)  Clonal Hematopoiesis (CH)  Biomarker Protein Change DNA Change Variant Frequency Interpretation  TP53 H214R c.641A>G 0.7 % Pathogenic Variant      INTERVAL HISTORY  Hoda is seen in routine 2 month follow-up. She has been feeling well. She is tolerating treatment well without side effects. She has had some allergy symptoms and is going to see an allergist in September. She has intermittent PND, itchy eyes, nasal congestion, as well as had her hand turn red  and puffy several months ago. She is taking claritin PRN for symptoms and has a nasal spray and eye drops to use PRN as well. No fevers/chills or recent infections. Appetite and energy are intact. No new or different pain. No SOB, edema, nausea, or bowel difficulties.         PHYSICAL EXAMINATION:    There were no vitals taken for this visit.  Video physical exam  General: Patient appears well in no acute distress.   Skin: No visualized rash or lesions on visualized skin  Eyes: EOMI, no erythema, sclera icterus or discharge noted  Resp: Appears to be breathing comfortably without accessory muscle usage, speaking in full sentences, no cough  MSK: Appears to have normal range of motion based on visualized movements  Neurologic: No apparent tremors, facial movements symmetric  Psych: affect bright, alert and oriented     LABORATORY DATA:     06/26/25 10:19   Sodium 140   Potassium 4.5   Chloride 105   Carbon Dioxide (CO2) 25   Urea Nitrogen 16.7   Creatinine 0.84   GFR Estimate 75   Calcium 9.1   Anion Gap 10   Albumin 4.0   Protein Total 7.5   Alkaline Phosphatase 51   ALT 72 (H)   AST 66 (H)   Bilirubin Total 0.3   CEA 1.1   Cholesterol 202 (H)   Patient Fasting? Yes  Yes   Glucose 96   HDL Cholesterol 42 (L)   Estimated Average Glucose 131 (H)   Hemoglobin A1C 6.2 (H)   LDL Cholesterol Calculated 136 (H)   Non HDL Cholesterol 160 (H)   Triglycerides 119   TSH 0.66   WBC 6.6   Hemoglobin 12.0   Hematocrit 39.1   Platelet Count 212   RBC Count 4.18   MCV 94   MCH 28.7   MCHC 30.7 (L)   RDW 13.5   % Neutrophils 49   % Lymphocytes 40   % Monocytes 7   % Eosinophils 3   % Basophils 1   % Immature Granulocytes 0   NRBC/W 0   Absolute Neutrophil 3.3   Absolute Lymphocytes 2.7   Absolute Monocytes 0.4   Absolute Eosinophils 0.2   Absolute Basophils 0.0   Absolute Immature Granulocytes 0.0   Absolute NRBCs 0.0       CT CAP: May 14, 2025   IMPRESSION:  1.  Scattered sclerotic bony metastases are not significantly changed.  2.   Small pulmonary nodules in the right middle lobe and left lower lobe are unchanged.  3.  Hepatic steatosis.     EXAM: MR LUMBAR SPINE W/O & W CONTRAST  2/10/2025 5:53 PM      HISTORY:  severe acute onset midline back pain, known spinal  metastasis; Low back pain; r/o Cancer; No known/automatically detected  potential contraindications to imaging        COMPARISON:  CT CAP 1/6/2025     TECHNIQUE: Sagittal T1-weighted and T2-weighted and axial T2-weighted  images of the lumbar spine were obtained without intravenous contrast.  Post intravenous contrast using gadolinium axial and sagittal  T1-weighted images were obtained with fat saturation.     CONTRAST: gadobutrol (GADAVIST) injection 8 mL.     FINDINGS:  There are 5 lumbar type vertebrae, used for the purposes of this  dictation. Conus tip at L1. Normal lumbar vertebral alignment.  Heterogeneous marrow signal of the lumbar spine and visualized  portions of the sacrum/pelvis, compatible with known diffuse  metastatic disease. Most conspicuous lesions in the L1 and L3  vertebral bodies, as well as the right iliac bone. Mild disc space  narrowing with loss of the intrinsic T2 signal at T12-L1 and L1-L2.  Normal cauda equina.     On a level by level basis, the findings are as follows:     L1-2: Circumferential disc osteophyte complex. Mild bilateral  neuroforaminal stenosis. Mild spinal canal stenosis.     L2-3: Circumferential disc bulge. Bilateral facet arthropathy. Mild  bilateral neural foraminal stenosis. No spinal canal stenosis.     L3-4: Bilateral facet arthropathy. Mild left neural foraminal  stenosis. Tiny incidental left perineural cyst. Osseous and  periarticular edema of the left facet joint.     L4-5: Bilateral facet arthropathy, right greater than left, and  ligamentum flavum hypertrophy. Mild bilateral neuroforaminal stenosis.  No spinal canal stenosis. Osseous and periarticular edema of the right  facet joint.     L5-S1: Circumferential disc bulge  with annular fissuring in the  central zone. Bilateral facet arthropathy, left greater than right. No  spinal canal or neural foraminal stenosis.     The visualized paraspinous soft tissues are within normal limits.                                                                       IMPRESSION:  1. No acute fracture of the lumbar spine.  2. Multifocal metastases of the lumbar spine and visualized portions  of the sacrum/pelvis, similar to the CT 1/6/2025.  3. Multilevel lumbar spondylosis. No high-grade neuroforaminal or  spinal canal stenosis.  4. Active inflammatory arthropathy of the left L3-4 and right L4-5  facet joints.     I have personally reviewed the examination and initial interpretation  and I agree with the findings.     REBECCA OLMSTEAD MD     PET February 25, 2025  IMPRESSION:   In this patient with history of metastatic left breast cancer:  1. No metabolically active disease. Stable appearance of the sclerotic  lesions throughout the axial and appendicular skeleton.  2. Stable couple of 5 mm pulmonary nodules.  3. Mildly avid nonenlarged right cervical lymph nodes are likely  reactive.     I have personally reviewed the examination and initial interpretation  and I agree with the findings.     DARLING ANNA MD         ASSESSMENT AND PLAN:       Hoda Brush is a 70-year-old woman with a history of ER-positive, VA-positive, HER2 positive breast cancer.  She is from [facility] and came to live in the [place] with her daughter.  The tumor is ER positive, VA positive and HER2 positive.  She had metastatic disease at the time of presentation with bone-only metastases by report.  She underwent neoadjuvant CAF, had a left mastectomy, left axillary lymph node dissection, radiation to the right hip, which included the right iliac region where she had metastatic disease.  She was initially on tamoxifen but then was switched to letrozole.  She continues on this and every 3-week trastuzumab.   She has had no evidence of disease progression for the last 7 years and possible longer but we don't have detailed data from [facility].  Markers are low and stable.  We have continued Herceptin every 3 weeks as well as daily letrozole. CT CAP in May showed stable pulmonary nodules. Next scan in September.   At risk for cardiomyopathy. Last echo 6/26/25 showed normal EF and strain. Dr. Aguiar has requested monitoring to continue every 3 months.   The right maxillary osteonecrosis has healed and she is able to continue with Xgeva every 3 months, resumed on 2/27/2025. She had a repeat DEXA scan done yesterday. Result is pending at time of visit but prelim report shows normal bone density in L spine.   NGS.  None actionable DEWEY mutation.    20 minutes spent on the date of the encounter doing chart review, review of test results, interpretation of tests, patient visit, and documentation     The longitudinal plan of care for the diagnosis(es)/condition(s) as documented were addressed during this visit. Due to the added complexity in care, I will continue to support Hoda in the subsequent management and with ongoing continuity of care.      Anne Amos PA-C              Again, thank you for allowing me to participate in the care of your patient.        Sincerely,        Anne Amos PA-C    Electronically signed

## 2025-07-17 ENCOUNTER — INFUSION THERAPY VISIT (OUTPATIENT)
Dept: ONCOLOGY | Facility: CLINIC | Age: 70
End: 2025-07-17
Attending: INTERNAL MEDICINE
Payer: COMMERCIAL

## 2025-07-17 ENCOUNTER — APPOINTMENT (OUTPATIENT)
Dept: LAB | Facility: CLINIC | Age: 70
End: 2025-07-17
Attending: INTERNAL MEDICINE
Payer: COMMERCIAL

## 2025-07-17 VITALS
HEART RATE: 77 BPM | TEMPERATURE: 97.7 F | BODY MASS INDEX: 33.09 KG/M2 | WEIGHT: 180.9 LBS | SYSTOLIC BLOOD PRESSURE: 117 MMHG | OXYGEN SATURATION: 99 % | DIASTOLIC BLOOD PRESSURE: 76 MMHG | RESPIRATION RATE: 20 BRPM

## 2025-07-17 DIAGNOSIS — C50.412 CARCINOMA OF UPPER-OUTER QUADRANT OF LEFT BREAST IN FEMALE, ESTROGEN RECEPTOR POSITIVE (H): ICD-10-CM

## 2025-07-17 DIAGNOSIS — Z17.0 CARCINOMA OF UPPER-OUTER QUADRANT OF LEFT BREAST IN FEMALE, ESTROGEN RECEPTOR POSITIVE (H): ICD-10-CM

## 2025-07-17 DIAGNOSIS — C50.912 MALIGNANT NEOPLASM OF LEFT FEMALE BREAST, UNSPECIFIED ESTROGEN RECEPTOR STATUS, UNSPECIFIED SITE OF BREAST (H): Primary | ICD-10-CM

## 2025-07-17 LAB
ALBUMIN SERPL BCG-MCNC: 3.8 G/DL (ref 3.5–5.2)
ALP SERPL-CCNC: 54 U/L (ref 40–150)
ALT SERPL W P-5'-P-CCNC: 58 U/L (ref 0–50)
ANION GAP SERPL CALCULATED.3IONS-SCNC: 11 MMOL/L (ref 7–15)
AST SERPL W P-5'-P-CCNC: 55 U/L (ref 0–45)
BASOPHILS # BLD AUTO: 0 10E3/UL (ref 0–0.2)
BASOPHILS NFR BLD AUTO: 0 %
BILIRUB SERPL-MCNC: 0.3 MG/DL
BUN SERPL-MCNC: 15.1 MG/DL (ref 8–23)
CALCIUM SERPL-MCNC: 9.4 MG/DL (ref 8.8–10.4)
CEA SERPL-MCNC: 1.2 NG/ML
CHLORIDE SERPL-SCNC: 107 MMOL/L (ref 98–107)
CREAT SERPL-MCNC: 0.8 MG/DL (ref 0.51–0.95)
EGFRCR SERPLBLD CKD-EPI 2021: 79 ML/MIN/1.73M2
EOSINOPHIL # BLD AUTO: 0.2 10E3/UL (ref 0–0.7)
EOSINOPHIL NFR BLD AUTO: 3 %
ERYTHROCYTE [DISTWIDTH] IN BLOOD BY AUTOMATED COUNT: 13.5 % (ref 10–15)
GLUCOSE SERPL-MCNC: 106 MG/DL (ref 70–99)
HCO3 SERPL-SCNC: 24 MMOL/L (ref 22–29)
HCT VFR BLD AUTO: 41.3 % (ref 35–47)
HGB BLD-MCNC: 12.7 G/DL (ref 11.7–15.7)
IMM GRANULOCYTES # BLD: 0 10E3/UL
IMM GRANULOCYTES NFR BLD: 0 %
LYMPHOCYTES # BLD AUTO: 2.6 10E3/UL (ref 0.8–5.3)
LYMPHOCYTES NFR BLD AUTO: 36 %
MCH RBC QN AUTO: 28.6 PG (ref 26.5–33)
MCHC RBC AUTO-ENTMCNC: 30.8 G/DL (ref 31.5–36.5)
MCV RBC AUTO: 93 FL (ref 78–100)
MONOCYTES # BLD AUTO: 0.4 10E3/UL (ref 0–1.3)
MONOCYTES NFR BLD AUTO: 6 %
NEUTROPHILS # BLD AUTO: 3.9 10E3/UL (ref 1.6–8.3)
NEUTROPHILS NFR BLD AUTO: 54 %
NRBC # BLD AUTO: 0 10E3/UL
NRBC BLD AUTO-RTO: 0 /100
PLATELET # BLD AUTO: 227 10E3/UL (ref 150–450)
POTASSIUM SERPL-SCNC: 4.4 MMOL/L (ref 3.4–5.3)
PROT SERPL-MCNC: 7.4 G/DL (ref 6.4–8.3)
RBC # BLD AUTO: 4.44 10E6/UL (ref 3.8–5.2)
SODIUM SERPL-SCNC: 142 MMOL/L (ref 135–145)
WBC # BLD AUTO: 7.2 10E3/UL (ref 4–11)

## 2025-07-17 PROCEDURE — 85004 AUTOMATED DIFF WBC COUNT: CPT

## 2025-07-17 PROCEDURE — 250N000011 HC RX IP 250 OP 636: Performed by: INTERNAL MEDICINE

## 2025-07-17 PROCEDURE — 258N000003 HC RX IP 258 OP 636: Performed by: INTERNAL MEDICINE

## 2025-07-17 PROCEDURE — 36591 DRAW BLOOD OFF VENOUS DEVICE: CPT

## 2025-07-17 PROCEDURE — 82310 ASSAY OF CALCIUM: CPT

## 2025-07-17 PROCEDURE — 82378 CARCINOEMBRYONIC ANTIGEN: CPT

## 2025-07-17 RX ORDER — HEPARIN SODIUM (PORCINE) LOCK FLUSH IV SOLN 100 UNIT/ML 100 UNIT/ML
5 SOLUTION INTRAVENOUS ONCE
Status: COMPLETED | OUTPATIENT
Start: 2025-07-17 | End: 2025-07-17

## 2025-07-17 RX ORDER — HEPARIN SODIUM (PORCINE) LOCK FLUSH IV SOLN 100 UNIT/ML 100 UNIT/ML
5 SOLUTION INTRAVENOUS
Status: DISCONTINUED | OUTPATIENT
Start: 2025-07-17 | End: 2025-07-17 | Stop reason: HOSPADM

## 2025-07-17 RX ADMIN — SODIUM CHLORIDE 500 MG: 0.9 INJECTION, SOLUTION INTRAVENOUS at 09:24

## 2025-07-17 RX ADMIN — Medication 5 ML: at 10:01

## 2025-07-17 RX ADMIN — SODIUM CHLORIDE 250 ML: 0.9 INJECTION, SOLUTION INTRAVENOUS at 09:00

## 2025-07-17 RX ADMIN — Medication 5 ML: at 08:41

## 2025-07-17 ASSESSMENT — PAIN SCALES - GENERAL: PAINLEVEL_OUTOF10: NO PAIN (0)

## 2025-07-17 NOTE — PROGRESS NOTES
Infusion Nursing Note:  Hoda Brush presents today for Cycle 9 Day 1 Trastuzumab.    Patient spoke with provider: Yes: Anne CHA yesterday    Treatment Conditions:  Component      Latest Ref Rng 7/17/2025  8:39 AM   WBC      4.0 - 11.0 10e3/uL 7.2    RBC Count      3.80 - 5.20 10e6/uL 4.44    Hemoglobin      11.7 - 15.7 g/dL 12.7    Hematocrit      35.0 - 47.0 % 41.3    MCV      78 - 100 fL 93    MCH      26.5 - 33.0 pg 28.6    MCHC      31.5 - 36.5 g/dL 30.8 (L)    RDW      10.0 - 15.0 % 13.5    Platelet Count      150 - 450 10e3/uL 227    % Neutrophils      % 54    % Lymphocytes      % 36    % Monocytes      % 6    % Eosinophils      % 3    % Basophils      % 0    % Immature Granulocytes      % 0    NRBC/W      <1 /100 0    Absolute Neutrophil      1.6 - 8.3 10e3/uL 3.9    Absolute Lymphocytes      0.8 - 5.3 10e3/uL 2.6    Absolute Monocytes      0.0 - 1.3 10e3/uL 0.4    Absolute Eosinophils      0.0 - 0.7 10e3/uL 0.2    Absolute Basophils      0.0 - 0.2 10e3/uL 0.0    Absolute Immature Granulocytes      <=0.4 10e3/uL 0.0    Absolute NRBCs      10e3/uL 0.0    Sodium      135 - 145 mmol/L 142    Potassium      3.4 - 5.3 mmol/L 4.4    Carbon Dioxide (CO2)      22 - 29 mmol/L 24    Anion Gap      7 - 15 mmol/L 11    Urea Nitrogen      8.0 - 23.0 mg/dL 15.1    Creatinine      0.51 - 0.95 mg/dL 0.80    GFR Estimate      >60 mL/min/1.73m2 79    Calcium      8.8 - 10.4 mg/dL 9.4    Chloride      98 - 107 mmol/L 107    Glucose      70 - 99 mg/dL 106 (H)    Alkaline Phosphatase      40 - 150 U/L 54    AST      0 - 45 U/L 55 (H)    ALT      0 - 50 U/L 58 (H)    Protein Total      6.4 - 8.3 g/dL 7.4    Albumin      3.5 - 5.2 g/dL 3.8    Bilirubin Total      <=1.2 mg/dL 0.3       Legend:  (L) Low  (H) High      Note: No concerns during infusion visit. No changes over night since speaking with  Anne CHA.    ECHO done 6/26/25: Left ventricular ejection fraction is 55-60%.    Intravenous  Access:  Implanted Port.    Post Infusion Assessment:  Patient tolerated infusion without incident.  Blood return noted pre and post infusion.  Access discontinued per protocol.    Discharge Plan:   Patient declined prescription refills.  Discharge instructions reviewed with: Patient.  Patient and/or family verbalized understanding of discharge instructions and all questions answered.  AVS to patient via [email or web address].  Patient will return 8/7/25 for next appointment.   Patient discharged in stable condition accompanied by: self.  Departure Mode: Ambulatory.      Linda Nvaarro RN

## 2025-07-17 NOTE — NURSING NOTE
"Chief Complaint   Patient presents with    Chemotherapy     Cycle 9 Day 1 Trastuzumab    Port Draw     Labs drawn via port by RN. Port accessed with 20g 3/4\" power needle and vitals WNL. Flushed with saline and heparin. Pt tolerated well. Patient checked into next appointment.       Marycarmen Magana RN    "

## 2025-08-07 ENCOUNTER — APPOINTMENT (OUTPATIENT)
Dept: LAB | Facility: CLINIC | Age: 70
End: 2025-08-07
Attending: HOSPITALIST
Payer: COMMERCIAL

## 2025-08-07 ENCOUNTER — INFUSION THERAPY VISIT (OUTPATIENT)
Dept: ONCOLOGY | Facility: CLINIC | Age: 70
End: 2025-08-07
Attending: NURSE PRACTITIONER
Payer: COMMERCIAL

## 2025-08-07 VITALS
BODY MASS INDEX: 33.25 KG/M2 | WEIGHT: 181.8 LBS | OXYGEN SATURATION: 98 % | SYSTOLIC BLOOD PRESSURE: 120 MMHG | RESPIRATION RATE: 18 BRPM | HEART RATE: 84 BPM | TEMPERATURE: 98.1 F | DIASTOLIC BLOOD PRESSURE: 80 MMHG

## 2025-08-07 DIAGNOSIS — C50.912 MALIGNANT NEOPLASM OF LEFT FEMALE BREAST, UNSPECIFIED ESTROGEN RECEPTOR STATUS, UNSPECIFIED SITE OF BREAST (H): Primary | ICD-10-CM

## 2025-08-07 LAB
ALBUMIN SERPL BCG-MCNC: 4 G/DL (ref 3.5–5.2)
ALP SERPL-CCNC: 50 U/L (ref 40–150)
ALT SERPL W P-5'-P-CCNC: 76 U/L (ref 0–50)
ANION GAP SERPL CALCULATED.3IONS-SCNC: 10 MMOL/L (ref 7–15)
AST SERPL W P-5'-P-CCNC: 63 U/L (ref 0–45)
BASOPHILS # BLD AUTO: 0.1 10E3/UL (ref 0–0.2)
BASOPHILS NFR BLD AUTO: 1 %
BILIRUB SERPL-MCNC: 0.3 MG/DL
BUN SERPL-MCNC: 12 MG/DL (ref 8–23)
CALCIUM SERPL-MCNC: 9.9 MG/DL (ref 8.8–10.4)
CEA SERPL-MCNC: 1.2 NG/ML
CHLORIDE SERPL-SCNC: 105 MMOL/L (ref 98–107)
CREAT SERPL-MCNC: 0.76 MG/DL (ref 0.51–0.95)
EGFRCR SERPLBLD CKD-EPI 2021: 84 ML/MIN/1.73M2
EOSINOPHIL # BLD AUTO: 0.2 10E3/UL (ref 0–0.7)
EOSINOPHIL NFR BLD AUTO: 3 %
ERYTHROCYTE [DISTWIDTH] IN BLOOD BY AUTOMATED COUNT: 14 % (ref 10–15)
GLUCOSE SERPL-MCNC: 94 MG/DL (ref 70–99)
HCO3 SERPL-SCNC: 28 MMOL/L (ref 22–29)
HCT VFR BLD AUTO: 41.2 % (ref 35–47)
HGB BLD-MCNC: 12.7 G/DL (ref 11.7–15.7)
IMM GRANULOCYTES # BLD: 0 10E3/UL
IMM GRANULOCYTES NFR BLD: 0 %
LYMPHOCYTES # BLD AUTO: 3.1 10E3/UL (ref 0.8–5.3)
LYMPHOCYTES NFR BLD AUTO: 41 %
MCH RBC QN AUTO: 28.7 PG (ref 26.5–33)
MCHC RBC AUTO-ENTMCNC: 30.8 G/DL (ref 31.5–36.5)
MCV RBC AUTO: 93 FL (ref 78–100)
MONOCYTES # BLD AUTO: 0.5 10E3/UL (ref 0–1.3)
MONOCYTES NFR BLD AUTO: 6 %
NEUTROPHILS # BLD AUTO: 3.8 10E3/UL (ref 1.6–8.3)
NEUTROPHILS NFR BLD AUTO: 49 %
NRBC # BLD AUTO: 0 10E3/UL
NRBC BLD AUTO-RTO: 0 /100
PLATELET # BLD AUTO: 169 10E3/UL (ref 150–450)
POTASSIUM SERPL-SCNC: 4.4 MMOL/L (ref 3.4–5.3)
PROT SERPL-MCNC: 7.6 G/DL (ref 6.4–8.3)
RBC # BLD AUTO: 4.43 10E6/UL (ref 3.8–5.2)
SODIUM SERPL-SCNC: 143 MMOL/L (ref 135–145)
WBC # BLD AUTO: 7.6 10E3/UL (ref 4–11)

## 2025-08-07 PROCEDURE — 36591 DRAW BLOOD OFF VENOUS DEVICE: CPT

## 2025-08-07 PROCEDURE — 80053 COMPREHEN METABOLIC PANEL: CPT

## 2025-08-07 PROCEDURE — 85004 AUTOMATED DIFF WBC COUNT: CPT

## 2025-08-07 PROCEDURE — 250N000011 HC RX IP 250 OP 636: Performed by: INTERNAL MEDICINE

## 2025-08-07 PROCEDURE — 82378 CARCINOEMBRYONIC ANTIGEN: CPT

## 2025-08-07 PROCEDURE — 258N000003 HC RX IP 258 OP 636: Performed by: INTERNAL MEDICINE

## 2025-08-07 RX ORDER — HEPARIN SODIUM (PORCINE) LOCK FLUSH IV SOLN 100 UNIT/ML 100 UNIT/ML
5 SOLUTION INTRAVENOUS ONCE
Status: COMPLETED | OUTPATIENT
Start: 2025-08-07 | End: 2025-08-07

## 2025-08-07 RX ORDER — HEPARIN SODIUM (PORCINE) LOCK FLUSH IV SOLN 100 UNIT/ML 100 UNIT/ML
5 SOLUTION INTRAVENOUS
Status: DISCONTINUED | OUTPATIENT
Start: 2025-08-07 | End: 2025-08-07 | Stop reason: HOSPADM

## 2025-08-07 RX ADMIN — Medication 5 ML: at 13:36

## 2025-08-07 RX ADMIN — SODIUM CHLORIDE 500 MG: 0.9 INJECTION, SOLUTION INTRAVENOUS at 13:02

## 2025-08-07 RX ADMIN — Medication 5 ML: at 12:00

## 2025-08-07 ASSESSMENT — PAIN SCALES - GENERAL: PAINLEVEL_OUTOF10: NO PAIN (0)

## 2025-08-25 RX ORDER — DIPHENHYDRAMINE HYDROCHLORIDE 50 MG/ML
25 INJECTION, SOLUTION INTRAMUSCULAR; INTRAVENOUS
Start: 2025-09-18

## 2025-08-25 RX ORDER — HEPARIN SODIUM,PORCINE 10 UNIT/ML
5-20 VIAL (ML) INTRAVENOUS DAILY PRN
OUTPATIENT
Start: 2025-09-18

## 2025-08-25 RX ORDER — DIPHENHYDRAMINE HCL 25 MG
50 CAPSULE ORAL
Status: CANCELLED
Start: 2025-08-28

## 2025-08-25 RX ORDER — ALBUTEROL SULFATE 0.83 MG/ML
2.5 SOLUTION RESPIRATORY (INHALATION)
OUTPATIENT
Start: 2025-09-18

## 2025-08-25 RX ORDER — ACETAMINOPHEN 325 MG/1
650 TABLET ORAL
Start: 2025-09-18

## 2025-08-25 RX ORDER — METHYLPREDNISOLONE SODIUM SUCCINATE 40 MG/ML
40 INJECTION INTRAMUSCULAR; INTRAVENOUS
Start: 2025-09-18

## 2025-08-25 RX ORDER — ACETAMINOPHEN 325 MG/1
650 TABLET ORAL
Status: CANCELLED
Start: 2025-08-28

## 2025-08-25 RX ORDER — EPINEPHRINE 1 MG/ML
0.3 INJECTION, SOLUTION INTRAMUSCULAR; SUBCUTANEOUS EVERY 5 MIN PRN
Status: CANCELLED | OUTPATIENT
Start: 2025-08-28

## 2025-08-25 RX ORDER — MEPERIDINE HYDROCHLORIDE 25 MG/ML
25 INJECTION INTRAMUSCULAR; INTRAVENOUS; SUBCUTANEOUS
OUTPATIENT
Start: 2025-09-18

## 2025-08-25 RX ORDER — ALBUTEROL SULFATE 0.83 MG/ML
2.5 SOLUTION RESPIRATORY (INHALATION)
Status: CANCELLED | OUTPATIENT
Start: 2025-08-28

## 2025-08-25 RX ORDER — METHYLPREDNISOLONE SODIUM SUCCINATE 40 MG/ML
40 INJECTION INTRAMUSCULAR; INTRAVENOUS
Status: CANCELLED
Start: 2025-08-28

## 2025-08-25 RX ORDER — DIPHENHYDRAMINE HYDROCHLORIDE 50 MG/ML
50 INJECTION, SOLUTION INTRAMUSCULAR; INTRAVENOUS
Status: CANCELLED
Start: 2025-08-28

## 2025-08-25 RX ORDER — LORAZEPAM 2 MG/ML
0.5 INJECTION INTRAMUSCULAR EVERY 4 HOURS PRN
Status: CANCELLED | OUTPATIENT
Start: 2025-08-28

## 2025-08-25 RX ORDER — HEPARIN SODIUM (PORCINE) LOCK FLUSH IV SOLN 100 UNIT/ML 100 UNIT/ML
5 SOLUTION INTRAVENOUS
Status: CANCELLED | OUTPATIENT
Start: 2025-08-28

## 2025-08-25 RX ORDER — DIPHENHYDRAMINE HYDROCHLORIDE 50 MG/ML
50 INJECTION, SOLUTION INTRAMUSCULAR; INTRAVENOUS
Start: 2025-09-18

## 2025-08-25 RX ORDER — DIPHENHYDRAMINE HCL 25 MG
50 CAPSULE ORAL
Start: 2025-09-18

## 2025-08-25 RX ORDER — HEPARIN SODIUM,PORCINE 10 UNIT/ML
5-20 VIAL (ML) INTRAVENOUS DAILY PRN
Status: CANCELLED | OUTPATIENT
Start: 2025-08-28

## 2025-08-25 RX ORDER — EPINEPHRINE 1 MG/ML
0.3 INJECTION, SOLUTION INTRAMUSCULAR; SUBCUTANEOUS EVERY 5 MIN PRN
OUTPATIENT
Start: 2025-09-18

## 2025-08-25 RX ORDER — ALBUTEROL SULFATE 90 UG/1
1-2 INHALANT RESPIRATORY (INHALATION)
Start: 2025-09-18

## 2025-08-25 RX ORDER — MEPERIDINE HYDROCHLORIDE 25 MG/ML
25 INJECTION INTRAMUSCULAR; INTRAVENOUS; SUBCUTANEOUS
Status: CANCELLED | OUTPATIENT
Start: 2025-08-28

## 2025-08-25 RX ORDER — LORAZEPAM 2 MG/ML
0.5 INJECTION INTRAMUSCULAR EVERY 4 HOURS PRN
OUTPATIENT
Start: 2025-09-18

## 2025-08-25 RX ORDER — HEPARIN SODIUM (PORCINE) LOCK FLUSH IV SOLN 100 UNIT/ML 100 UNIT/ML
5 SOLUTION INTRAVENOUS
OUTPATIENT
Start: 2025-09-18

## 2025-08-25 RX ORDER — DIPHENHYDRAMINE HYDROCHLORIDE 50 MG/ML
25 INJECTION, SOLUTION INTRAMUSCULAR; INTRAVENOUS
Status: CANCELLED
Start: 2025-08-28

## 2025-08-25 RX ORDER — ALBUTEROL SULFATE 90 UG/1
1-2 INHALANT RESPIRATORY (INHALATION)
Status: CANCELLED
Start: 2025-08-28

## 2025-08-28 ENCOUNTER — INFUSION THERAPY VISIT (OUTPATIENT)
Dept: ONCOLOGY | Facility: CLINIC | Age: 70
End: 2025-08-28
Attending: INTERNAL MEDICINE
Payer: COMMERCIAL

## 2025-08-28 ENCOUNTER — APPOINTMENT (OUTPATIENT)
Dept: LAB | Facility: CLINIC | Age: 70
End: 2025-08-28
Attending: INTERNAL MEDICINE
Payer: COMMERCIAL

## 2025-08-28 VITALS
RESPIRATION RATE: 20 BRPM | WEIGHT: 183.5 LBS | DIASTOLIC BLOOD PRESSURE: 70 MMHG | HEART RATE: 85 BPM | OXYGEN SATURATION: 95 % | BODY MASS INDEX: 33.56 KG/M2 | TEMPERATURE: 98 F | SYSTOLIC BLOOD PRESSURE: 107 MMHG

## 2025-08-28 DIAGNOSIS — C50.912 MALIGNANT NEOPLASM OF LEFT FEMALE BREAST, UNSPECIFIED ESTROGEN RECEPTOR STATUS, UNSPECIFIED SITE OF BREAST (H): ICD-10-CM

## 2025-08-28 DIAGNOSIS — C79.51 MALIGNANT NEOPLASM METASTATIC TO BONE (H): Primary | ICD-10-CM

## 2025-08-28 LAB
ALBUMIN SERPL BCG-MCNC: 3.9 G/DL (ref 3.5–5.2)
ALP SERPL-CCNC: 49 U/L (ref 40–150)
ALT SERPL W P-5'-P-CCNC: 69 U/L (ref 0–50)
ANION GAP SERPL CALCULATED.3IONS-SCNC: 10 MMOL/L (ref 7–15)
AST SERPL W P-5'-P-CCNC: 73 U/L (ref 0–45)
BASOPHILS # BLD AUTO: 0.03 10E3/UL (ref 0–0.2)
BASOPHILS NFR BLD AUTO: 0.4 %
BILIRUB SERPL-MCNC: 0.3 MG/DL
BUN SERPL-MCNC: 15.1 MG/DL (ref 8–23)
CALCIUM SERPL-MCNC: 9.1 MG/DL (ref 8.8–10.4)
CALCIUM SERPL-MCNC: 9.1 MG/DL (ref 8.8–10.4)
CEA SERPL-MCNC: 1.2 NG/ML
CHLORIDE SERPL-SCNC: 104 MMOL/L (ref 98–107)
CREAT SERPL-MCNC: 0.78 MG/DL (ref 0.51–0.95)
EGFRCR SERPLBLD CKD-EPI 2021: 82 ML/MIN/1.73M2
EOSINOPHIL # BLD AUTO: 0.17 10E3/UL (ref 0–0.7)
EOSINOPHIL NFR BLD AUTO: 2.3 %
ERYTHROCYTE [DISTWIDTH] IN BLOOD BY AUTOMATED COUNT: 13.8 % (ref 10–15)
GLUCOSE SERPL-MCNC: 90 MG/DL (ref 70–99)
HCO3 SERPL-SCNC: 28 MMOL/L (ref 22–29)
HCT VFR BLD AUTO: 40.2 % (ref 35–47)
HGB BLD-MCNC: 12.4 G/DL (ref 11.7–15.7)
IMM GRANULOCYTES # BLD: <0.03 10E3/UL
IMM GRANULOCYTES NFR BLD: 0.1 %
LYMPHOCYTES # BLD AUTO: 2.8 10E3/UL (ref 0.8–5.3)
LYMPHOCYTES NFR BLD AUTO: 37.2 %
MCH RBC QN AUTO: 28.7 PG (ref 26.5–33)
MCHC RBC AUTO-ENTMCNC: 30.8 G/DL (ref 31.5–36.5)
MCV RBC AUTO: 93.1 FL (ref 78–100)
MONOCYTES # BLD AUTO: 0.46 10E3/UL (ref 0–1.3)
MONOCYTES NFR BLD AUTO: 6.1 %
NEUTROPHILS # BLD AUTO: 4.05 10E3/UL (ref 1.6–8.3)
NEUTROPHILS NFR BLD AUTO: 53.9 %
NRBC # BLD AUTO: <0.03 10E3/UL
NRBC BLD AUTO-RTO: 0 /100
PLATELET # BLD AUTO: 188 10E3/UL (ref 150–450)
POTASSIUM SERPL-SCNC: 4.2 MMOL/L (ref 3.4–5.3)
PROT SERPL-MCNC: 7.5 G/DL (ref 6.4–8.3)
RBC # BLD AUTO: 4.32 10E6/UL (ref 3.8–5.2)
SODIUM SERPL-SCNC: 142 MMOL/L (ref 135–145)
WBC # BLD AUTO: 7.52 10E3/UL (ref 4–11)

## 2025-08-28 PROCEDURE — 85025 COMPLETE CBC W/AUTO DIFF WBC: CPT

## 2025-08-28 PROCEDURE — 82378 CARCINOEMBRYONIC ANTIGEN: CPT

## 2025-08-28 PROCEDURE — 80053 COMPREHEN METABOLIC PANEL: CPT

## 2025-08-28 PROCEDURE — 36591 DRAW BLOOD OFF VENOUS DEVICE: CPT

## 2025-08-28 PROCEDURE — 258N000003 HC RX IP 258 OP 636: Performed by: INTERNAL MEDICINE

## 2025-08-28 PROCEDURE — 96372 THER/PROPH/DIAG INJ SC/IM: CPT | Performed by: INTERNAL MEDICINE

## 2025-08-28 PROCEDURE — 250N000011 HC RX IP 250 OP 636: Mod: JZ | Performed by: INTERNAL MEDICINE

## 2025-08-28 RX ORDER — HEPARIN SODIUM (PORCINE) LOCK FLUSH IV SOLN 100 UNIT/ML 100 UNIT/ML
5 SOLUTION INTRAVENOUS ONCE
Status: COMPLETED | OUTPATIENT
Start: 2025-08-28 | End: 2025-08-28

## 2025-08-28 RX ORDER — HEPARIN SODIUM (PORCINE) LOCK FLUSH IV SOLN 100 UNIT/ML 100 UNIT/ML
5 SOLUTION INTRAVENOUS
Status: DISCONTINUED | OUTPATIENT
Start: 2025-08-28 | End: 2025-08-28 | Stop reason: HOSPADM

## 2025-08-28 RX ADMIN — Medication 5 ML: at 13:10

## 2025-08-28 RX ADMIN — DENOSUMAB 120 MG: 120 INJECTION SUBCUTANEOUS at 12:41

## 2025-08-28 RX ADMIN — Medication 5 ML: at 11:42

## 2025-08-28 RX ADMIN — SODIUM CHLORIDE 500 MG: 0.9 INJECTION, SOLUTION INTRAVENOUS at 12:39

## 2025-08-28 ASSESSMENT — PAIN SCALES - GENERAL: PAINLEVEL_OUTOF10: NO PAIN (0)

## 2025-09-04 ENCOUNTER — OFFICE VISIT (OUTPATIENT)
Dept: PODIATRY | Facility: CLINIC | Age: 70
End: 2025-09-04
Payer: COMMERCIAL

## 2025-09-04 DIAGNOSIS — L08.9 INFECTION OF GREAT TOE: Primary | ICD-10-CM

## 2025-09-04 RX ORDER — NEOMYCIN SULFATE, POLYMYXIN B SULFATE AND HYDROCORTISONE 3.5; 10000; 1 MG/ML; [IU]/ML; MG/ML
1-2 SOLUTION AURICULAR (OTIC) 2 TIMES DAILY
Qty: 10 ML | Refills: 0 | Status: SHIPPED | OUTPATIENT
Start: 2025-09-04 | End: 2025-09-18

## 2025-09-04 RX ORDER — CEPHALEXIN 500 MG/1
500 CAPSULE ORAL 2 TIMES DAILY
Qty: 28 CAPSULE | Refills: 0 | Status: SHIPPED | OUTPATIENT
Start: 2025-09-04

## (undated) DEVICE — GLOVE PROTEXIS POWDER FREE SMT 7.5  2D72PT75X

## (undated) DEVICE — ENDO TROCAR SLEEVE KII Z-THREADED 05X100MM CTS02

## (undated) DEVICE — DECANTER BAG 2002S

## (undated) DEVICE — GOWN IMPERVIOUS SPECIALTY XLG/XLONG 32474

## (undated) DEVICE — KIT SURGICAL TURNOVER FVSD-01D

## (undated) DEVICE — ESU HOLDER LAP INST DISP PURPLE LONG 330MM H-PRO-330

## (undated) DEVICE — KNIFE HANDLE W/15 BLADE 371615

## (undated) DEVICE — SOL NACL 0.9% INJ 1000ML BAG 2B1324X

## (undated) DEVICE — SUCTION CANISTER MEDIVAC LINER 3000ML W/LID 65651-530

## (undated) DEVICE — LINEN TOWEL PACK X5 5464

## (undated) DEVICE — GOWN XLG DISP 9545

## (undated) DEVICE — COVER ULTRASOUND PROBE W/GEL FLEXI-FEEL 6"X58" LF  25-FF658

## (undated) DEVICE — GLOVE PROTEXIS W/NEU-THERA 8.0  2D73TE80

## (undated) DEVICE — SU DERMABOND ADVANCED .7ML DNX12

## (undated) DEVICE — SU VICRYL 3-0 SH 27" J316H

## (undated) DEVICE — SU VICRYL 2-0 SH 27" J317H

## (undated) DEVICE — GLOVE PROTEXIS POWDER FREE SMT 7.0  2D72PT70X

## (undated) DEVICE — CLIP APPLIER ENDO 5MM M/L LIGAMAX EL5ML

## (undated) DEVICE — PACK LAP CHOLE SLC15LCFSD

## (undated) DEVICE — Device

## (undated) DEVICE — SU VICRYL 4-0 PS-2 18" UND J496H

## (undated) DEVICE — STPL POWERED ECHELON 45MM PSEE45A

## (undated) DEVICE — SU MONOCRYL 4-0 P-3 18" UND Y494G

## (undated) DEVICE — SOL WATER IRRIG 1000ML BOTTLE 2F7114

## (undated) DEVICE — POUCH TISSUE RETRIEVAL LAP 10MM 2.21" INTRO TRS100SB2

## (undated) DEVICE — ENDO TROCAR FIRST ENTRY KII FIOS Z-THRD 11X100MM CTF33

## (undated) DEVICE — PREP CHLORAPREP W/ORANGE TINT 10.5ML 260715

## (undated) DEVICE — DEVICE SUTURE GRASPER TROCAR CLOSURE 14GA PMITCSG

## (undated) DEVICE — ENDO TROCAR FIRST ENTRY KII FIOS Z-THRD 12X100MM CTF73

## (undated) DEVICE — ESU GROUND PAD UNIVERSAL W/O CORD

## (undated) DEVICE — SURGICEL HEMOSTAT 3X4" NUKNIT 1943

## (undated) DEVICE — ENDO TROCAR FIRST ENTRY KII FIOS Z-THRD 05X100MM CTF03

## (undated) DEVICE — GLOVE PROTEXIS POWDER FREE SMT 6.5  2D72PT65X

## (undated) DEVICE — PREP CHLORAPREP 26ML TINTED ORANGE  260815

## (undated) DEVICE — SUCTION IRR STRYKERFLOW II W/TIP 250-070-520

## (undated) RX ORDER — PROPOFOL 10 MG/ML
INJECTION, EMULSION INTRAVENOUS
Status: DISPENSED
Start: 2017-09-15

## (undated) RX ORDER — HEPARIN SODIUM (PORCINE) LOCK FLUSH IV SOLN 100 UNIT/ML 100 UNIT/ML
SOLUTION INTRAVENOUS
Status: DISPENSED
Start: 2023-07-11

## (undated) RX ORDER — HEPARIN SODIUM (PORCINE) LOCK FLUSH IV SOLN 100 UNIT/ML 100 UNIT/ML
SOLUTION INTRAVENOUS
Status: DISPENSED
Start: 2025-01-06

## (undated) RX ORDER — HEPARIN SODIUM (PORCINE) LOCK FLUSH IV SOLN 100 UNIT/ML 100 UNIT/ML
SOLUTION INTRAVENOUS
Status: DISPENSED
Start: 2022-06-01

## (undated) RX ORDER — HEPARIN SODIUM (PORCINE) LOCK FLUSH IV SOLN 100 UNIT/ML 100 UNIT/ML
SOLUTION INTRAVENOUS
Status: DISPENSED
Start: 2023-12-18

## (undated) RX ORDER — HEPARIN SODIUM (PORCINE) LOCK FLUSH IV SOLN 100 UNIT/ML 100 UNIT/ML
SOLUTION INTRAVENOUS
Status: DISPENSED
Start: 2025-05-14

## (undated) RX ORDER — LIDOCAINE HYDROCHLORIDE 10 MG/ML
INJECTION, SOLUTION EPIDURAL; INFILTRATION; INTRACAUDAL; PERINEURAL
Status: DISPENSED
Start: 2017-09-15

## (undated) RX ORDER — LIDOCAINE HYDROCHLORIDE 20 MG/ML
INJECTION, SOLUTION EPIDURAL; INFILTRATION; INTRACAUDAL; PERINEURAL
Status: DISPENSED
Start: 2017-09-15

## (undated) RX ORDER — HEPARIN SODIUM (PORCINE) LOCK FLUSH IV SOLN 100 UNIT/ML 100 UNIT/ML
SOLUTION INTRAVENOUS
Status: DISPENSED
Start: 2018-03-26

## (undated) RX ORDER — HEPARIN SODIUM (PORCINE) LOCK FLUSH IV SOLN 100 UNIT/ML 100 UNIT/ML
SOLUTION INTRAVENOUS
Status: DISPENSED
Start: 2020-05-18

## (undated) RX ORDER — HEPARIN SODIUM (PORCINE) LOCK FLUSH IV SOLN 100 UNIT/ML 100 UNIT/ML
SOLUTION INTRAVENOUS
Status: DISPENSED
Start: 2018-07-05

## (undated) RX ORDER — HEPARIN SODIUM (PORCINE) LOCK FLUSH IV SOLN 100 UNIT/ML 100 UNIT/ML
SOLUTION INTRAVENOUS
Status: DISPENSED
Start: 2019-04-08

## (undated) RX ORDER — DIPHENHYDRAMINE HYDROCHLORIDE 50 MG/ML
INJECTION INTRAMUSCULAR; INTRAVENOUS
Status: DISPENSED
Start: 2018-02-01

## (undated) RX ORDER — HEPARIN SODIUM (PORCINE) LOCK FLUSH IV SOLN 100 UNIT/ML 100 UNIT/ML
SOLUTION INTRAVENOUS
Status: DISPENSED
Start: 2022-03-09

## (undated) RX ORDER — HEPARIN SODIUM (PORCINE) LOCK FLUSH IV SOLN 100 UNIT/ML 100 UNIT/ML
SOLUTION INTRAVENOUS
Status: DISPENSED
Start: 2020-02-05

## (undated) RX ORDER — HEPARIN SODIUM (PORCINE) LOCK FLUSH IV SOLN 100 UNIT/ML 100 UNIT/ML
SOLUTION INTRAVENOUS
Status: DISPENSED
Start: 2019-01-14

## (undated) RX ORDER — HEPARIN SODIUM (PORCINE) LOCK FLUSH IV SOLN 100 UNIT/ML 100 UNIT/ML
SOLUTION INTRAVENOUS
Status: DISPENSED
Start: 2019-11-18

## (undated) RX ORDER — HEPARIN SODIUM (PORCINE) LOCK FLUSH IV SOLN 100 UNIT/ML 100 UNIT/ML
SOLUTION INTRAVENOUS
Status: DISPENSED
Start: 2020-12-14

## (undated) RX ORDER — FENTANYL CITRATE 50 UG/ML
INJECTION, SOLUTION INTRAMUSCULAR; INTRAVENOUS
Status: DISPENSED
Start: 2018-02-16

## (undated) RX ORDER — HEPARIN SODIUM (PORCINE) LOCK FLUSH IV SOLN 100 UNIT/ML 100 UNIT/ML
SOLUTION INTRAVENOUS
Status: DISPENSED
Start: 2018-02-16

## (undated) RX ORDER — HEPARIN SODIUM (PORCINE) LOCK FLUSH IV SOLN 100 UNIT/ML 100 UNIT/ML
SOLUTION INTRAVENOUS
Status: DISPENSED
Start: 2024-09-05

## (undated) RX ORDER — FENTANYL CITRATE 50 UG/ML
INJECTION, SOLUTION INTRAMUSCULAR; INTRAVENOUS
Status: DISPENSED
Start: 2020-02-05

## (undated) RX ORDER — HEPARIN SODIUM (PORCINE) LOCK FLUSH IV SOLN 100 UNIT/ML 100 UNIT/ML
SOLUTION INTRAVENOUS
Status: DISPENSED
Start: 2018-10-01

## (undated) RX ORDER — HYDROMORPHONE HYDROCHLORIDE 1 MG/ML
INJECTION, SOLUTION INTRAMUSCULAR; INTRAVENOUS; SUBCUTANEOUS
Status: DISPENSED
Start: 2019-08-31

## (undated) RX ORDER — BUPIVACAINE HYDROCHLORIDE 5 MG/ML
INJECTION, SOLUTION EPIDURAL; INTRACAUDAL
Status: DISPENSED
Start: 2017-08-31

## (undated) RX ORDER — FENTANYL CITRATE 50 UG/ML
INJECTION, SOLUTION INTRAMUSCULAR; INTRAVENOUS
Status: DISPENSED
Start: 2018-02-01

## (undated) RX ORDER — HEPARIN SODIUM (PORCINE) LOCK FLUSH IV SOLN 100 UNIT/ML 100 UNIT/ML
SOLUTION INTRAVENOUS
Status: DISPENSED
Start: 2020-08-10

## (undated) RX ORDER — HEPARIN SODIUM (PORCINE) LOCK FLUSH IV SOLN 100 UNIT/ML 100 UNIT/ML
SOLUTION INTRAVENOUS
Status: DISPENSED
Start: 2021-12-13

## (undated) RX ORDER — ONDANSETRON 2 MG/ML
INJECTION INTRAMUSCULAR; INTRAVENOUS
Status: DISPENSED
Start: 2017-09-15

## (undated) RX ORDER — HEPARIN SODIUM (PORCINE) LOCK FLUSH IV SOLN 100 UNIT/ML 100 UNIT/ML
SOLUTION INTRAVENOUS
Status: DISPENSED
Start: 2021-03-29

## (undated) RX ORDER — SIMETHICONE 20 MG/.3ML
EMULSION ORAL
Status: DISPENSED
Start: 2018-02-16

## (undated) RX ORDER — HEPARIN SODIUM (PORCINE) LOCK FLUSH IV SOLN 100 UNIT/ML 100 UNIT/ML
SOLUTION INTRAVENOUS
Status: DISPENSED
Start: 2021-06-21

## (undated) RX ORDER — BUPIVACAINE HYDROCHLORIDE 2.5 MG/ML
INJECTION, SOLUTION EPIDURAL; INFILTRATION; INTRACAUDAL
Status: DISPENSED
Start: 2017-08-31

## (undated) RX ORDER — HEPARIN SODIUM (PORCINE) LOCK FLUSH IV SOLN 100 UNIT/ML 100 UNIT/ML
SOLUTION INTRAVENOUS
Status: DISPENSED
Start: 2020-02-03

## (undated) RX ORDER — HEPARIN SODIUM (PORCINE) LOCK FLUSH IV SOLN 100 UNIT/ML 100 UNIT/ML
SOLUTION INTRAVENOUS
Status: DISPENSED
Start: 2023-04-12

## (undated) RX ORDER — HEPARIN SODIUM (PORCINE) LOCK FLUSH IV SOLN 100 UNIT/ML 100 UNIT/ML
SOLUTION INTRAVENOUS
Status: DISPENSED
Start: 2021-10-18

## (undated) RX ORDER — FENTANYL CITRATE 50 UG/ML
INJECTION, SOLUTION INTRAMUSCULAR; INTRAVENOUS
Status: DISPENSED
Start: 2019-08-31

## (undated) RX ORDER — BUPIVACAINE HYDROCHLORIDE AND EPINEPHRINE 2.5; 5 MG/ML; UG/ML
INJECTION, SOLUTION EPIDURAL; INFILTRATION; INTRACAUDAL; PERINEURAL
Status: DISPENSED
Start: 2019-08-31

## (undated) RX ORDER — FENTANYL CITRATE 50 UG/ML
INJECTION, SOLUTION INTRAMUSCULAR; INTRAVENOUS
Status: DISPENSED
Start: 2017-08-31

## (undated) RX ORDER — HEPARIN SODIUM (PORCINE) LOCK FLUSH IV SOLN 100 UNIT/ML 100 UNIT/ML
SOLUTION INTRAVENOUS
Status: DISPENSED
Start: 2021-11-04

## (undated) RX ORDER — HEPARIN SODIUM (PORCINE) LOCK FLUSH IV SOLN 100 UNIT/ML 100 UNIT/ML
SOLUTION INTRAVENOUS
Status: DISPENSED
Start: 2018-02-01

## (undated) RX ORDER — HEPARIN SODIUM (PORCINE) LOCK FLUSH IV SOLN 100 UNIT/ML 100 UNIT/ML
SOLUTION INTRAVENOUS
Status: DISPENSED
Start: 2019-07-22

## (undated) RX ORDER — HEPARIN SODIUM (PORCINE) LOCK FLUSH IV SOLN 100 UNIT/ML 100 UNIT/ML
SOLUTION INTRAVENOUS
Status: DISPENSED
Start: 2023-01-16

## (undated) RX ORDER — HEPARIN SODIUM (PORCINE) LOCK FLUSH IV SOLN 100 UNIT/ML 100 UNIT/ML
SOLUTION INTRAVENOUS
Status: DISPENSED
Start: 2023-08-16